# Patient Record
Sex: MALE | Race: WHITE | NOT HISPANIC OR LATINO | Employment: OTHER | URBAN - METROPOLITAN AREA
[De-identification: names, ages, dates, MRNs, and addresses within clinical notes are randomized per-mention and may not be internally consistent; named-entity substitution may affect disease eponyms.]

---

## 2017-01-04 ENCOUNTER — APPOINTMENT (OUTPATIENT)
Dept: LAB | Facility: MEDICAL CENTER | Age: 62
End: 2017-01-04
Payer: COMMERCIAL

## 2017-01-04 DIAGNOSIS — D58.9 HEREDITARY HEMOLYTIC ANEMIA, UNSPECIFIED (HCC): ICD-10-CM

## 2017-01-04 LAB
BASOPHILS # BLD AUTO: 0.02 THOUSANDS/ΜL (ref 0–0.1)
BASOPHILS NFR BLD AUTO: 0 % (ref 0–1)
EOSINOPHIL # BLD AUTO: 0.42 THOUSAND/ΜL (ref 0–0.61)
EOSINOPHIL NFR BLD AUTO: 8 % (ref 0–6)
ERYTHROCYTE [DISTWIDTH] IN BLOOD BY AUTOMATED COUNT: 14.2 % (ref 11.6–15.1)
HCT VFR BLD AUTO: 37.2 % (ref 36.5–49.3)
HGB BLD-MCNC: 12.4 G/DL (ref 12–17)
LYMPHOCYTES # BLD AUTO: 1.65 THOUSANDS/ΜL (ref 0.6–4.47)
LYMPHOCYTES NFR BLD AUTO: 33 % (ref 14–44)
MCH RBC QN AUTO: 34.3 PG (ref 26.8–34.3)
MCHC RBC AUTO-ENTMCNC: 33.3 G/DL (ref 31.4–37.4)
MCV RBC AUTO: 103 FL (ref 82–98)
MONOCYTES # BLD AUTO: 0.41 THOUSAND/ΜL (ref 0.17–1.22)
MONOCYTES NFR BLD AUTO: 8 % (ref 4–12)
NEUTROPHILS # BLD AUTO: 2.48 THOUSANDS/ΜL (ref 1.85–7.62)
NEUTS SEG NFR BLD AUTO: 51 % (ref 43–75)
NRBC BLD AUTO-RTO: 0 /100 WBCS
PLATELET # BLD AUTO: 222 THOUSANDS/UL (ref 149–390)
PMV BLD AUTO: 10.4 FL (ref 8.9–12.7)
RBC # BLD AUTO: 3.61 MILLION/UL (ref 3.88–5.62)
WBC # BLD AUTO: 4.99 THOUSAND/UL (ref 4.31–10.16)

## 2017-01-04 PROCEDURE — 36415 COLL VENOUS BLD VENIPUNCTURE: CPT

## 2017-01-04 PROCEDURE — 85025 COMPLETE CBC W/AUTO DIFF WBC: CPT

## 2017-01-06 ENCOUNTER — GENERIC CONVERSION - ENCOUNTER (OUTPATIENT)
Dept: OTHER | Facility: OTHER | Age: 62
End: 2017-01-06

## 2017-01-11 ENCOUNTER — APPOINTMENT (OUTPATIENT)
Dept: LAB | Facility: MEDICAL CENTER | Age: 62
End: 2017-01-11
Payer: COMMERCIAL

## 2017-01-11 DIAGNOSIS — D58.9 HEREDITARY HEMOLYTIC ANEMIA, UNSPECIFIED (HCC): ICD-10-CM

## 2017-01-11 LAB
BASOPHILS # BLD AUTO: 0.04 THOUSANDS/ΜL (ref 0–0.1)
BASOPHILS NFR BLD AUTO: 1 % (ref 0–1)
EOSINOPHIL # BLD AUTO: 0.48 THOUSAND/ΜL (ref 0–0.61)
EOSINOPHIL NFR BLD AUTO: 10 % (ref 0–6)
ERYTHROCYTE [DISTWIDTH] IN BLOOD BY AUTOMATED COUNT: 14 % (ref 11.6–15.1)
HCT VFR BLD AUTO: 37.8 % (ref 36.5–49.3)
HGB BLD-MCNC: 12.6 G/DL (ref 12–17)
LYMPHOCYTES # BLD AUTO: 1.78 THOUSANDS/ΜL (ref 0.6–4.47)
LYMPHOCYTES NFR BLD AUTO: 38 % (ref 14–44)
MCH RBC QN AUTO: 34.3 PG (ref 26.8–34.3)
MCHC RBC AUTO-ENTMCNC: 33.3 G/DL (ref 31.4–37.4)
MCV RBC AUTO: 103 FL (ref 82–98)
MONOCYTES # BLD AUTO: 0.5 THOUSAND/ΜL (ref 0.17–1.22)
MONOCYTES NFR BLD AUTO: 11 % (ref 4–12)
NEUTROPHILS # BLD AUTO: 1.86 THOUSANDS/ΜL (ref 1.85–7.62)
NEUTS SEG NFR BLD AUTO: 40 % (ref 43–75)
NRBC BLD AUTO-RTO: 0 /100 WBCS
PLATELET # BLD AUTO: 215 THOUSANDS/UL (ref 149–390)
PMV BLD AUTO: 10.5 FL (ref 8.9–12.7)
RBC # BLD AUTO: 3.67 MILLION/UL (ref 3.88–5.62)
WBC # BLD AUTO: 4.68 THOUSAND/UL (ref 4.31–10.16)

## 2017-01-11 PROCEDURE — 85025 COMPLETE CBC W/AUTO DIFF WBC: CPT

## 2017-01-11 PROCEDURE — 36415 COLL VENOUS BLD VENIPUNCTURE: CPT

## 2017-01-17 ENCOUNTER — ALLSCRIPTS OFFICE VISIT (OUTPATIENT)
Dept: OTHER | Facility: OTHER | Age: 62
End: 2017-01-17

## 2017-01-23 ENCOUNTER — LAB (OUTPATIENT)
Dept: LAB | Facility: MEDICAL CENTER | Age: 62
End: 2017-01-23
Payer: COMMERCIAL

## 2017-01-23 ENCOUNTER — TRANSCRIBE ORDERS (OUTPATIENT)
Dept: ADMINISTRATIVE | Facility: HOSPITAL | Age: 62
End: 2017-01-23

## 2017-01-23 DIAGNOSIS — D58.9 HEREDITARY HEMOLYTIC ANEMIA, UNSPECIFIED (HCC): Primary | ICD-10-CM

## 2017-01-23 DIAGNOSIS — D59.11 HEMOLYTIC ANEMIA DUE TO WARM ANTIBODY (HCC): ICD-10-CM

## 2017-01-23 LAB
BASOPHILS # BLD AUTO: 0.04 THOUSANDS/ΜL (ref 0–0.1)
BASOPHILS NFR BLD AUTO: 1 % (ref 0–1)
EOSINOPHIL # BLD AUTO: 1.18 THOUSAND/ΜL (ref 0–0.61)
EOSINOPHIL NFR BLD AUTO: 14 % (ref 0–6)
ERYTHROCYTE [DISTWIDTH] IN BLOOD BY AUTOMATED COUNT: 13.4 % (ref 11.6–15.1)
HCT VFR BLD AUTO: 37.8 % (ref 36.5–49.3)
HGB BLD-MCNC: 12.6 G/DL (ref 12–17)
LYMPHOCYTES # BLD AUTO: 1.89 THOUSANDS/ΜL (ref 0.6–4.47)
LYMPHOCYTES NFR BLD AUTO: 22 % (ref 14–44)
MCH RBC QN AUTO: 34.1 PG (ref 26.8–34.3)
MCHC RBC AUTO-ENTMCNC: 33.3 G/DL (ref 31.4–37.4)
MCV RBC AUTO: 102 FL (ref 82–98)
MONOCYTES # BLD AUTO: 0.56 THOUSAND/ΜL (ref 0.17–1.22)
MONOCYTES NFR BLD AUTO: 7 % (ref 4–12)
NEUTROPHILS # BLD AUTO: 4.93 THOUSANDS/ΜL (ref 1.85–7.62)
NEUTS SEG NFR BLD AUTO: 56 % (ref 43–75)
NRBC BLD AUTO-RTO: 0 /100 WBCS
PLATELET # BLD AUTO: 218 THOUSANDS/UL (ref 149–390)
PMV BLD AUTO: 11.1 FL (ref 8.9–12.7)
RBC # BLD AUTO: 3.7 MILLION/UL (ref 3.88–5.62)
WBC # BLD AUTO: 8.63 THOUSAND/UL (ref 4.31–10.16)

## 2017-01-23 PROCEDURE — 36415 COLL VENOUS BLD VENIPUNCTURE: CPT

## 2017-01-23 PROCEDURE — 85025 COMPLETE CBC W/AUTO DIFF WBC: CPT

## 2017-02-02 ENCOUNTER — APPOINTMENT (OUTPATIENT)
Dept: LAB | Facility: MEDICAL CENTER | Age: 62
End: 2017-02-02
Payer: COMMERCIAL

## 2017-02-02 DIAGNOSIS — D58.9 HEREDITARY HEMOLYTIC ANEMIA, UNSPECIFIED (HCC): ICD-10-CM

## 2017-02-02 LAB
BASOPHILS # BLD AUTO: 0.05 THOUSANDS/ΜL (ref 0–0.1)
BASOPHILS NFR BLD AUTO: 1 % (ref 0–1)
EOSINOPHIL # BLD AUTO: 1.2 THOUSAND/ΜL (ref 0–0.61)
EOSINOPHIL NFR BLD AUTO: 17 % (ref 0–6)
ERYTHROCYTE [DISTWIDTH] IN BLOOD BY AUTOMATED COUNT: 14.1 % (ref 11.6–15.1)
HCT VFR BLD AUTO: 22.7 % (ref 36.5–49.3)
HGB BLD-MCNC: 7.8 G/DL (ref 12–17)
LYMPHOCYTES # BLD AUTO: 1.62 THOUSANDS/ΜL (ref 0.6–4.47)
LYMPHOCYTES NFR BLD AUTO: 23 % (ref 14–44)
MCH RBC QN AUTO: 35.9 PG (ref 26.8–34.3)
MCHC RBC AUTO-ENTMCNC: 34.4 G/DL (ref 31.4–37.4)
MCV RBC AUTO: 105 FL (ref 82–98)
MONOCYTES # BLD AUTO: 0.62 THOUSAND/ΜL (ref 0.17–1.22)
MONOCYTES NFR BLD AUTO: 9 % (ref 4–12)
NEUTROPHILS # BLD AUTO: 3.4 THOUSANDS/ΜL (ref 1.85–7.62)
NEUTS SEG NFR BLD AUTO: 50 % (ref 43–75)
NRBC BLD AUTO-RTO: 1 /100 WBCS
PLATELET # BLD AUTO: 206 THOUSANDS/UL (ref 149–390)
PMV BLD AUTO: 10.6 FL (ref 8.9–12.7)
RBC # BLD AUTO: 2.17 MILLION/UL (ref 3.88–5.62)
WBC # BLD AUTO: 6.95 THOUSAND/UL (ref 4.31–10.16)

## 2017-02-02 PROCEDURE — 85025 COMPLETE CBC W/AUTO DIFF WBC: CPT

## 2017-02-02 PROCEDURE — 36415 COLL VENOUS BLD VENIPUNCTURE: CPT

## 2017-02-03 ENCOUNTER — GENERIC CONVERSION - ENCOUNTER (OUTPATIENT)
Dept: OTHER | Facility: OTHER | Age: 62
End: 2017-02-03

## 2017-02-03 ENCOUNTER — APPOINTMENT (EMERGENCY)
Dept: RADIOLOGY | Facility: HOSPITAL | Age: 62
DRG: 810 | End: 2017-02-03
Payer: COMMERCIAL

## 2017-02-03 ENCOUNTER — HOSPITAL ENCOUNTER (INPATIENT)
Facility: HOSPITAL | Age: 62
LOS: 1 days | Discharge: HOME | DRG: 810 | End: 2017-02-05
Attending: EMERGENCY MEDICINE | Admitting: INTERNAL MEDICINE
Payer: COMMERCIAL

## 2017-02-03 DIAGNOSIS — D64.9 SYMPTOMATIC ANEMIA: Primary | ICD-10-CM

## 2017-02-03 DIAGNOSIS — E80.6 HYPERBILIRUBINEMIA: ICD-10-CM

## 2017-02-03 DIAGNOSIS — D59.11 HEMOLYTIC ANEMIA DUE TO WARM ANTIBODY (HCC): ICD-10-CM

## 2017-02-03 LAB
ABO GROUP BLD: NORMAL
ALBUMIN SERPL BCP-MCNC: 3.9 G/DL (ref 3.5–5)
ALP SERPL-CCNC: 88 U/L (ref 46–116)
ALT SERPL W P-5'-P-CCNC: 35 U/L (ref 12–78)
ANION GAP SERPL CALCULATED.3IONS-SCNC: 12 MMOL/L (ref 4–13)
AST SERPL W P-5'-P-CCNC: 78 U/L (ref 5–45)
ATRIAL RATE: 96 BPM
BASOPHILS # BLD AUTO: 0.04 THOUSANDS/ΜL (ref 0–0.1)
BASOPHILS NFR BLD AUTO: 0 % (ref 0–1)
BILIRUB SERPL-MCNC: 7.55 MG/DL (ref 0.2–1)
BLD GP AB SCN SERPL QL: POSITIVE
BLOOD GROUP ANTIBODIES SERPL: NORMAL
BUN SERPL-MCNC: 25 MG/DL (ref 5–25)
CALCIUM SERPL-MCNC: 9 MG/DL (ref 8.3–10.1)
CHLORIDE SERPL-SCNC: 103 MMOL/L (ref 100–108)
CO2 SERPL-SCNC: 26 MMOL/L (ref 21–32)
CREAT SERPL-MCNC: 1.04 MG/DL (ref 0.6–1.3)
EOSINOPHIL # BLD AUTO: 0.14 THOUSAND/ΜL (ref 0–0.61)
EOSINOPHIL NFR BLD AUTO: 1 % (ref 0–6)
ERYTHROCYTE [DISTWIDTH] IN BLOOD BY AUTOMATED COUNT: 15.3 % (ref 11.6–15.1)
GFR SERPL CREATININE-BSD FRML MDRD: >60 ML/MIN/1.73SQ M
GLUCOSE SERPL-MCNC: 143 MG/DL (ref 65–140)
HCT VFR BLD AUTO: 18.3 % (ref 36.5–49.3)
HGB BLD-MCNC: 6.4 G/DL (ref 12–17)
LYMPHOCYTES # BLD AUTO: 1.16 THOUSANDS/ΜL (ref 0.6–4.47)
LYMPHOCYTES NFR BLD AUTO: 11 % (ref 14–44)
MCH RBC QN AUTO: 37.9 PG (ref 26.8–34.3)
MCHC RBC AUTO-ENTMCNC: 35 G/DL (ref 31.4–37.4)
MCV RBC AUTO: 108 FL (ref 82–98)
MONOCYTES # BLD AUTO: 0.52 THOUSAND/ΜL (ref 0.17–1.22)
MONOCYTES NFR BLD AUTO: 5 % (ref 4–12)
NEUTROPHILS # BLD AUTO: 8.44 THOUSANDS/ΜL (ref 1.85–7.62)
NEUTS SEG NFR BLD AUTO: 83 % (ref 43–75)
NRBC BLD AUTO-RTO: 1 /100 WBCS
P AXIS: 42 DEGREES
PLATELET # BLD AUTO: 219 THOUSANDS/UL (ref 149–390)
PMV BLD AUTO: 10.2 FL (ref 8.9–12.7)
POTASSIUM SERPL-SCNC: 3.8 MMOL/L (ref 3.5–5.3)
PR INTERVAL: 150 MS
PROT SERPL-MCNC: 7.1 G/DL (ref 6.4–8.2)
QRS AXIS: 42 DEGREES
QRSD INTERVAL: 82 MS
QT INTERVAL: 342 MS
QTC INTERVAL: 432 MS
RBC # BLD AUTO: 1.69 MILLION/UL (ref 3.88–5.62)
RH BLD: POSITIVE
SODIUM SERPL-SCNC: 141 MMOL/L (ref 136–145)
T WAVE AXIS: 48 DEGREES
TROPONIN I SERPL-MCNC: 0.03 NG/ML
VENTRICULAR RATE: 96 BPM
WBC # BLD AUTO: 10.3 THOUSAND/UL (ref 4.31–10.16)

## 2017-02-03 PROCEDURE — 86922 COMPATIBILITY TEST ANTIGLOB: CPT

## 2017-02-03 PROCEDURE — 86921 COMPATIBILITY TEST INCUBATE: CPT

## 2017-02-03 PROCEDURE — 30233N1 TRANSFUSION OF NONAUTOLOGOUS RED BLOOD CELLS INTO PERIPHERAL VEIN, PERCUTANEOUS APPROACH: ICD-10-PCS | Performed by: EMERGENCY MEDICINE

## 2017-02-03 PROCEDURE — 84484 ASSAY OF TROPONIN QUANT: CPT | Performed by: EMERGENCY MEDICINE

## 2017-02-03 PROCEDURE — 86900 BLOOD TYPING SEROLOGIC ABO: CPT | Performed by: EMERGENCY MEDICINE

## 2017-02-03 PROCEDURE — 86850 RBC ANTIBODY SCREEN: CPT | Performed by: EMERGENCY MEDICINE

## 2017-02-03 PROCEDURE — 71020 HB CHEST X-RAY 2VW FRONTAL&LATL: CPT

## 2017-02-03 PROCEDURE — 36415 COLL VENOUS BLD VENIPUNCTURE: CPT | Performed by: EMERGENCY MEDICINE

## 2017-02-03 PROCEDURE — 86901 BLOOD TYPING SEROLOGIC RH(D): CPT | Performed by: EMERGENCY MEDICINE

## 2017-02-03 PROCEDURE — 99285 EMERGENCY DEPT VISIT HI MDM: CPT

## 2017-02-03 PROCEDURE — 85025 COMPLETE CBC W/AUTO DIFF WBC: CPT | Performed by: EMERGENCY MEDICINE

## 2017-02-03 PROCEDURE — 86902 BLOOD TYPE ANTIGEN DONOR EA: CPT

## 2017-02-03 PROCEDURE — 86870 RBC ANTIBODY IDENTIFICATION: CPT | Performed by: EMERGENCY MEDICINE

## 2017-02-03 PROCEDURE — 80053 COMPREHEN METABOLIC PANEL: CPT

## 2017-02-03 PROCEDURE — 93005 ELECTROCARDIOGRAM TRACING: CPT

## 2017-02-03 PROCEDURE — P9021 RED BLOOD CELLS UNIT: HCPCS

## 2017-02-03 RX ORDER — LANOLIN ALCOHOL/MO/W.PET/CERES
1000 CREAM (GRAM) TOPICAL DAILY
COMMUNITY
End: 2020-01-14 | Stop reason: ALTCHOICE

## 2017-02-03 RX ORDER — ONDANSETRON 2 MG/ML
4 INJECTION INTRAMUSCULAR; INTRAVENOUS EVERY 6 HOURS PRN
Status: DISCONTINUED | OUTPATIENT
Start: 2017-02-03 | End: 2017-02-04

## 2017-02-03 RX ORDER — ERGOCALCIFEROL 1.25 MG/1
50000 CAPSULE ORAL WEEKLY
COMMUNITY
End: 2019-10-28

## 2017-02-03 RX ORDER — PREDNISONE 20 MG/1
50 TABLET ORAL DAILY
Status: DISCONTINUED | OUTPATIENT
Start: 2017-02-03 | End: 2017-02-05 | Stop reason: HOSPADM

## 2017-02-03 RX ORDER — PREDNISONE 50 MG/1
10 TABLET ORAL DAILY
COMMUNITY
End: 2017-05-22 | Stop reason: HOSPADM

## 2017-02-03 RX ORDER — CHOLECALCIFEROL (VITAMIN D3) 125 MCG
1000 CAPSULE ORAL DAILY
Status: DISCONTINUED | OUTPATIENT
Start: 2017-02-03 | End: 2017-02-05 | Stop reason: HOSPADM

## 2017-02-03 RX ORDER — ACETAMINOPHEN 325 MG/1
650 TABLET ORAL EVERY 6 HOURS PRN
Status: DISCONTINUED | OUTPATIENT
Start: 2017-02-03 | End: 2017-02-05 | Stop reason: HOSPADM

## 2017-02-03 RX ORDER — NICOTINE 21 MG/24HR
1 PATCH, TRANSDERMAL 24 HOURS TRANSDERMAL DAILY
Status: DISCONTINUED | OUTPATIENT
Start: 2017-02-03 | End: 2017-02-05 | Stop reason: HOSPADM

## 2017-02-03 RX ORDER — PNV NO.95/FERROUS FUM/FOLIC AC 28MG-0.8MG
325 TABLET ORAL DAILY
COMMUNITY
End: 2018-04-17 | Stop reason: CLARIF

## 2017-02-03 RX ORDER — FERROUS SULFATE 325(65) MG
325 TABLET ORAL 2 TIMES DAILY
Status: DISCONTINUED | OUTPATIENT
Start: 2017-02-03 | End: 2017-02-05 | Stop reason: HOSPADM

## 2017-02-03 RX ORDER — PANTOPRAZOLE SODIUM 40 MG/1
40 TABLET, DELAYED RELEASE ORAL
Status: DISCONTINUED | OUTPATIENT
Start: 2017-02-03 | End: 2017-02-05 | Stop reason: HOSPADM

## 2017-02-04 LAB
ABO GROUP BLD BPU: NORMAL
ABO GROUP BLD BPU: NORMAL
ALBUMIN SERPL BCP-MCNC: 3.3 G/DL (ref 3.5–5)
ALP SERPL-CCNC: 76 U/L (ref 46–116)
ALT SERPL W P-5'-P-CCNC: 31 U/L (ref 12–78)
ANION GAP SERPL CALCULATED.3IONS-SCNC: 8 MMOL/L (ref 4–13)
AST SERPL W P-5'-P-CCNC: 58 U/L (ref 5–45)
BILIRUB SERPL-MCNC: 5.51 MG/DL (ref 0.2–1)
BPU ID: NORMAL
BPU ID: NORMAL
BUN SERPL-MCNC: 25 MG/DL (ref 5–25)
CALCIUM SERPL-MCNC: 8.5 MG/DL (ref 8.3–10.1)
CHLORIDE SERPL-SCNC: 107 MMOL/L (ref 100–108)
CO2 SERPL-SCNC: 28 MMOL/L (ref 21–32)
CREAT SERPL-MCNC: 0.93 MG/DL (ref 0.6–1.3)
CROSSMATCH: NORMAL
CROSSMATCH: NORMAL
ERYTHROCYTE [DISTWIDTH] IN BLOOD BY AUTOMATED COUNT: 17 % (ref 11.6–15.1)
GFR SERPL CREATININE-BSD FRML MDRD: >60 ML/MIN/1.73SQ M
GLUCOSE SERPL-MCNC: 99 MG/DL (ref 65–140)
HCT VFR BLD AUTO: 22.2 % (ref 36.5–49.3)
HGB BLD-MCNC: 7.8 G/DL (ref 12–17)
MCH RBC QN AUTO: 35.5 PG (ref 26.8–34.3)
MCHC RBC AUTO-ENTMCNC: 35.1 G/DL (ref 31.4–37.4)
MCV RBC AUTO: 101 FL (ref 82–98)
PLATELET # BLD AUTO: 148 THOUSANDS/UL (ref 149–390)
PMV BLD AUTO: 9.6 FL (ref 8.9–12.7)
POTASSIUM SERPL-SCNC: 3.5 MMOL/L (ref 3.5–5.3)
PROT SERPL-MCNC: 6.2 G/DL (ref 6.4–8.2)
RBC # BLD AUTO: 2.2 MILLION/UL (ref 3.88–5.62)
SODIUM SERPL-SCNC: 143 MMOL/L (ref 136–145)
UNIT DISPENSE STATUS: NORMAL
UNIT DISPENSE STATUS: NORMAL
UNIT PRODUCT CODE: NORMAL
UNIT PRODUCT CODE: NORMAL
UNIT RH: NORMAL
UNIT RH: NORMAL
WBC # BLD AUTO: 8.14 THOUSAND/UL (ref 4.31–10.16)

## 2017-02-04 PROCEDURE — 80053 COMPREHEN METABOLIC PANEL: CPT | Performed by: PHYSICIAN ASSISTANT

## 2017-02-04 PROCEDURE — P9016 RBC LEUKOCYTES REDUCED: HCPCS

## 2017-02-04 PROCEDURE — 85027 COMPLETE CBC AUTOMATED: CPT | Performed by: PHYSICIAN ASSISTANT

## 2017-02-04 RX ORDER — ACETAMINOPHEN 325 MG/1
650 TABLET ORAL EVERY 6 HOURS PRN
Status: DISCONTINUED | OUTPATIENT
Start: 2017-02-04 | End: 2017-02-04

## 2017-02-04 RX ORDER — CALCIUM CARBONATE 500(1250)
1 TABLET ORAL 2 TIMES DAILY WITH MEALS
Status: DISCONTINUED | OUTPATIENT
Start: 2017-02-04 | End: 2017-02-05 | Stop reason: HOSPADM

## 2017-02-04 RX ORDER — POLYETHYLENE GLYCOL 3350 17 G/17G
17 POWDER, FOR SOLUTION ORAL DAILY PRN
Status: DISCONTINUED | OUTPATIENT
Start: 2017-02-04 | End: 2017-02-05 | Stop reason: HOSPADM

## 2017-02-04 RX ADMIN — Medication 325 MG: at 17:14

## 2017-02-04 RX ADMIN — PREDNISONE 50 MG: 20 TABLET ORAL at 08:51

## 2017-02-04 RX ADMIN — CYANOCOBALAMIN TAB 500 MCG 1000 MCG: 500 TAB at 08:50

## 2017-02-04 RX ADMIN — Medication 325 MG: at 08:51

## 2017-02-04 RX ADMIN — Medication 1 TABLET: at 08:50

## 2017-02-04 RX ADMIN — PANTOPRAZOLE SODIUM 40 MG: 40 TABLET, DELAYED RELEASE ORAL at 05:11

## 2017-02-05 VITALS
HEIGHT: 69 IN | BODY MASS INDEX: 28.29 KG/M2 | SYSTOLIC BLOOD PRESSURE: 128 MMHG | WEIGHT: 191 LBS | HEART RATE: 77 BPM | TEMPERATURE: 97.2 F | DIASTOLIC BLOOD PRESSURE: 67 MMHG | OXYGEN SATURATION: 98 % | RESPIRATION RATE: 18 BRPM

## 2017-02-05 LAB
ABO GROUP BLD BPU: NORMAL
ALBUMIN SERPL BCP-MCNC: 3.2 G/DL (ref 3.5–5)
ALP SERPL-CCNC: 70 U/L (ref 46–116)
ALT SERPL W P-5'-P-CCNC: 28 U/L (ref 12–78)
ANION GAP SERPL CALCULATED.3IONS-SCNC: 9 MMOL/L (ref 4–13)
AST SERPL W P-5'-P-CCNC: 38 U/L (ref 5–45)
BASOPHILS # BLD AUTO: 0.04 THOUSANDS/ΜL (ref 0–0.1)
BASOPHILS NFR BLD AUTO: 1 % (ref 0–1)
BILIRUB SERPL-MCNC: 3.83 MG/DL (ref 0.2–1)
BPU ID: NORMAL
BUN SERPL-MCNC: 24 MG/DL (ref 5–25)
CALCIUM SERPL-MCNC: 8.3 MG/DL (ref 8.3–10.1)
CHLORIDE SERPL-SCNC: 107 MMOL/L (ref 100–108)
CO2 SERPL-SCNC: 26 MMOL/L (ref 21–32)
CREAT SERPL-MCNC: 0.87 MG/DL (ref 0.6–1.3)
CROSSMATCH: NORMAL
EOSINOPHIL # BLD AUTO: 0.16 THOUSAND/ΜL (ref 0–0.61)
EOSINOPHIL NFR BLD AUTO: 2 % (ref 0–6)
ERYTHROCYTE [DISTWIDTH] IN BLOOD BY AUTOMATED COUNT: 19.4 % (ref 11.6–15.1)
GFR SERPL CREATININE-BSD FRML MDRD: >60 ML/MIN/1.73SQ M
GLUCOSE SERPL-MCNC: 99 MG/DL (ref 65–140)
HCT VFR BLD AUTO: 22.9 % (ref 36.5–49.3)
HGB BLD-MCNC: 8 G/DL (ref 12–17)
LYMPHOCYTES # BLD AUTO: 1.67 THOUSANDS/ΜL (ref 0.6–4.47)
LYMPHOCYTES NFR BLD AUTO: 21 % (ref 14–44)
MCH RBC QN AUTO: 34.2 PG (ref 26.8–34.3)
MCHC RBC AUTO-ENTMCNC: 34.9 G/DL (ref 31.4–37.4)
MCV RBC AUTO: 98 FL (ref 82–98)
MONOCYTES # BLD AUTO: 0.81 THOUSAND/ΜL (ref 0.17–1.22)
MONOCYTES NFR BLD AUTO: 10 % (ref 4–12)
NEUTROPHILS # BLD AUTO: 5.31 THOUSANDS/ΜL (ref 1.85–7.62)
NEUTS SEG NFR BLD AUTO: 66 % (ref 43–75)
NRBC BLD AUTO-RTO: 1 /100 WBCS
PLATELET # BLD AUTO: 157 THOUSANDS/UL (ref 149–390)
PMV BLD AUTO: 10.1 FL (ref 8.9–12.7)
POTASSIUM SERPL-SCNC: 3.2 MMOL/L (ref 3.5–5.3)
PROT SERPL-MCNC: 6 G/DL (ref 6.4–8.2)
RBC # BLD AUTO: 2.34 MILLION/UL (ref 3.88–5.62)
SODIUM SERPL-SCNC: 142 MMOL/L (ref 136–145)
UNIT DISPENSE STATUS: NORMAL
UNIT PRODUCT CODE: NORMAL
UNIT RH: NORMAL
WBC # BLD AUTO: 7.99 THOUSAND/UL (ref 4.31–10.16)

## 2017-02-05 PROCEDURE — 80053 COMPREHEN METABOLIC PANEL: CPT | Performed by: INTERNAL MEDICINE

## 2017-02-05 PROCEDURE — 90670 PCV13 VACCINE IM: CPT | Performed by: INTERNAL MEDICINE

## 2017-02-05 PROCEDURE — 85025 COMPLETE CBC W/AUTO DIFF WBC: CPT | Performed by: INTERNAL MEDICINE

## 2017-02-05 PROCEDURE — 90686 IIV4 VACC NO PRSV 0.5 ML IM: CPT | Performed by: INTERNAL MEDICINE

## 2017-02-05 RX ORDER — POTASSIUM CHLORIDE 20 MEQ/1
40 TABLET, EXTENDED RELEASE ORAL ONCE
Status: COMPLETED | OUTPATIENT
Start: 2017-02-05 | End: 2017-02-05

## 2017-02-05 RX ORDER — PANTOPRAZOLE SODIUM 40 MG/1
40 TABLET, DELAYED RELEASE ORAL
Qty: 30 TABLET | Refills: 0 | Status: ON HOLD | OUTPATIENT
Start: 2017-02-05 | End: 2017-06-09

## 2017-02-05 RX ORDER — NICOTINE 21 MG/24HR
1 PATCH, TRANSDERMAL 24 HOURS TRANSDERMAL DAILY
Qty: 7 PATCH | Refills: 0 | Status: SHIPPED | OUTPATIENT
Start: 2017-02-05 | End: 2017-12-23

## 2017-02-05 RX ADMIN — Medication 1 TABLET: at 08:22

## 2017-02-05 RX ADMIN — PANTOPRAZOLE SODIUM 40 MG: 40 TABLET, DELAYED RELEASE ORAL at 05:40

## 2017-02-05 RX ADMIN — INFLUENZA VIRUS VACCINE 0.5 ML: 15; 15; 15; 15 SUSPENSION INTRAMUSCULAR at 12:15

## 2017-02-05 RX ADMIN — CYANOCOBALAMIN TAB 500 MCG 1000 MCG: 500 TAB at 08:21

## 2017-02-05 RX ADMIN — POTASSIUM CHLORIDE 40 MEQ: 1500 TABLET, EXTENDED RELEASE ORAL at 08:21

## 2017-02-05 RX ADMIN — Medication 325 MG: at 08:22

## 2017-02-05 RX ADMIN — PNEUMOCOCCAL 13-VALENT CONJUGATE VACCINE 0.5 ML: 2.2; 2.2; 2.2; 2.2; 2.2; 4.4; 2.2; 2.2; 2.2; 2.2; 2.2; 2.2; 2.2 INJECTION, SUSPENSION INTRAMUSCULAR at 12:23

## 2017-02-05 RX ADMIN — PREDNISONE 50 MG: 20 TABLET ORAL at 08:22

## 2017-02-06 ENCOUNTER — TRANSCRIBE ORDERS (OUTPATIENT)
Dept: ADMINISTRATIVE | Facility: HOSPITAL | Age: 62
End: 2017-02-06

## 2017-02-06 ENCOUNTER — APPOINTMENT (OUTPATIENT)
Dept: LAB | Facility: MEDICAL CENTER | Age: 62
End: 2017-02-06
Payer: COMMERCIAL

## 2017-02-06 DIAGNOSIS — D58.9 HEREDITARY HEMOLYTIC ANEMIA, UNSPECIFIED (HCC): Primary | ICD-10-CM

## 2017-02-06 DIAGNOSIS — D58.9 HEREDITARY HEMOLYTIC ANEMIA, UNSPECIFIED (HCC): ICD-10-CM

## 2017-02-06 DIAGNOSIS — D50.8 OTHER IRON DEFICIENCY ANEMIA: ICD-10-CM

## 2017-02-06 DIAGNOSIS — D50.8 OTHER IRON DEFICIENCY ANEMIA: Primary | ICD-10-CM

## 2017-02-06 LAB
BASOPHILS # BLD AUTO: 0.02 THOUSANDS/ΜL (ref 0–0.1)
BASOPHILS NFR BLD AUTO: 0 % (ref 0–1)
EOSINOPHIL # BLD AUTO: 0.04 THOUSAND/ΜL (ref 0–0.61)
EOSINOPHIL NFR BLD AUTO: 1 % (ref 0–6)
ERYTHROCYTE [DISTWIDTH] IN BLOOD BY AUTOMATED COUNT: 20.5 % (ref 11.6–15.1)
HCT VFR BLD AUTO: 26.8 % (ref 36.5–49.3)
HGB BLD-MCNC: 9 G/DL (ref 12–17)
LYMPHOCYTES # BLD AUTO: 1.07 THOUSANDS/ΜL (ref 0.6–4.47)
LYMPHOCYTES NFR BLD AUTO: 14 % (ref 14–44)
MCH RBC QN AUTO: 33.8 PG (ref 26.8–34.3)
MCHC RBC AUTO-ENTMCNC: 33.6 G/DL (ref 31.4–37.4)
MCV RBC AUTO: 101 FL (ref 82–98)
MONOCYTES # BLD AUTO: 0.6 THOUSAND/ΜL (ref 0.17–1.22)
MONOCYTES NFR BLD AUTO: 8 % (ref 4–12)
NEUTROPHILS # BLD AUTO: 5.91 THOUSANDS/ΜL (ref 1.85–7.62)
NEUTS SEG NFR BLD AUTO: 77 % (ref 43–75)
NRBC BLD AUTO-RTO: 0 /100 WBCS
PLATELET # BLD AUTO: 200 THOUSANDS/UL (ref 149–390)
PMV BLD AUTO: 10.8 FL (ref 8.9–12.7)
RBC # BLD AUTO: 2.66 MILLION/UL (ref 3.88–5.62)
WBC # BLD AUTO: 7.71 THOUSAND/UL (ref 4.31–10.16)

## 2017-02-06 PROCEDURE — 85025 COMPLETE CBC W/AUTO DIFF WBC: CPT

## 2017-02-06 PROCEDURE — 36415 COLL VENOUS BLD VENIPUNCTURE: CPT

## 2017-02-08 ENCOUNTER — GENERIC CONVERSION - ENCOUNTER (OUTPATIENT)
Dept: OTHER | Facility: OTHER | Age: 62
End: 2017-02-08

## 2017-02-13 ENCOUNTER — APPOINTMENT (OUTPATIENT)
Dept: LAB | Facility: MEDICAL CENTER | Age: 62
End: 2017-02-13
Payer: COMMERCIAL

## 2017-02-13 DIAGNOSIS — D58.9 HEREDITARY HEMOLYTIC ANEMIA, UNSPECIFIED (HCC): ICD-10-CM

## 2017-02-13 LAB
BASOPHILS # BLD AUTO: 0.01 THOUSANDS/ΜL (ref 0–0.1)
BASOPHILS NFR BLD AUTO: 0 % (ref 0–1)
EOSINOPHIL # BLD AUTO: 0.06 THOUSAND/ΜL (ref 0–0.61)
EOSINOPHIL NFR BLD AUTO: 1 % (ref 0–6)
ERYTHROCYTE [DISTWIDTH] IN BLOOD BY AUTOMATED COUNT: 23 % (ref 11.6–15.1)
HCT VFR BLD AUTO: 31.5 % (ref 36.5–49.3)
HGB BLD-MCNC: 10 G/DL (ref 12–17)
LYMPHOCYTES # BLD AUTO: 1.32 THOUSANDS/ΜL (ref 0.6–4.47)
LYMPHOCYTES NFR BLD AUTO: 25 % (ref 14–44)
MCH RBC QN AUTO: 35.1 PG (ref 26.8–34.3)
MCHC RBC AUTO-ENTMCNC: 31.7 G/DL (ref 31.4–37.4)
MCV RBC AUTO: 111 FL (ref 82–98)
MONOCYTES # BLD AUTO: 0.5 THOUSAND/ΜL (ref 0.17–1.22)
MONOCYTES NFR BLD AUTO: 10 % (ref 4–12)
NEUTROPHILS # BLD AUTO: 3.31 THOUSANDS/ΜL (ref 1.85–7.62)
NEUTS SEG NFR BLD AUTO: 64 % (ref 43–75)
NRBC BLD AUTO-RTO: 0 /100 WBCS
PLATELET # BLD AUTO: 202 THOUSANDS/UL (ref 149–390)
PMV BLD AUTO: 10.7 FL (ref 8.9–12.7)
RBC # BLD AUTO: 2.85 MILLION/UL (ref 3.88–5.62)
WBC # BLD AUTO: 5.22 THOUSAND/UL (ref 4.31–10.16)

## 2017-02-13 PROCEDURE — 85025 COMPLETE CBC W/AUTO DIFF WBC: CPT

## 2017-02-13 PROCEDURE — 36415 COLL VENOUS BLD VENIPUNCTURE: CPT

## 2017-02-14 ENCOUNTER — ALLSCRIPTS OFFICE VISIT (OUTPATIENT)
Dept: OTHER | Facility: OTHER | Age: 62
End: 2017-02-14

## 2017-02-14 ENCOUNTER — GENERIC CONVERSION - ENCOUNTER (OUTPATIENT)
Dept: OTHER | Facility: OTHER | Age: 62
End: 2017-02-14

## 2017-02-16 ENCOUNTER — GENERIC CONVERSION - ENCOUNTER (OUTPATIENT)
Dept: OTHER | Facility: OTHER | Age: 62
End: 2017-02-16

## 2017-02-20 ENCOUNTER — LAB (OUTPATIENT)
Dept: LAB | Facility: MEDICAL CENTER | Age: 62
End: 2017-02-20
Payer: COMMERCIAL

## 2017-02-20 DIAGNOSIS — D58.9 HEREDITARY HEMOLYTIC ANEMIA, UNSPECIFIED (HCC): ICD-10-CM

## 2017-02-20 LAB
BASOPHILS # BLD AUTO: 0.01 THOUSANDS/ΜL (ref 0–0.1)
BASOPHILS NFR BLD AUTO: 0 % (ref 0–1)
EOSINOPHIL # BLD AUTO: 0.06 THOUSAND/ΜL (ref 0–0.61)
EOSINOPHIL NFR BLD AUTO: 1 % (ref 0–6)
ERYTHROCYTE [DISTWIDTH] IN BLOOD BY AUTOMATED COUNT: 18.9 % (ref 11.6–15.1)
HCT VFR BLD AUTO: 34.6 % (ref 36.5–49.3)
HGB BLD-MCNC: 11 G/DL (ref 12–17)
LYMPHOCYTES # BLD AUTO: 1.08 THOUSANDS/ΜL (ref 0.6–4.47)
LYMPHOCYTES NFR BLD AUTO: 20 % (ref 14–44)
MCH RBC QN AUTO: 34.7 PG (ref 26.8–34.3)
MCHC RBC AUTO-ENTMCNC: 31.8 G/DL (ref 31.4–37.4)
MCV RBC AUTO: 109 FL (ref 82–98)
MONOCYTES # BLD AUTO: 0.42 THOUSAND/ΜL (ref 0.17–1.22)
MONOCYTES NFR BLD AUTO: 8 % (ref 4–12)
NEUTROPHILS # BLD AUTO: 3.8 THOUSANDS/ΜL (ref 1.85–7.62)
NEUTS SEG NFR BLD AUTO: 71 % (ref 43–75)
NRBC BLD AUTO-RTO: 0 /100 WBCS
PLATELET # BLD AUTO: 202 THOUSANDS/UL (ref 149–390)
PMV BLD AUTO: 10.2 FL (ref 8.9–12.7)
RBC # BLD AUTO: 3.17 MILLION/UL (ref 3.88–5.62)
WBC # BLD AUTO: 5.38 THOUSAND/UL (ref 4.31–10.16)

## 2017-02-20 PROCEDURE — 85025 COMPLETE CBC W/AUTO DIFF WBC: CPT

## 2017-02-20 PROCEDURE — 36415 COLL VENOUS BLD VENIPUNCTURE: CPT

## 2017-02-21 ENCOUNTER — ALLSCRIPTS OFFICE VISIT (OUTPATIENT)
Dept: OTHER | Facility: OTHER | Age: 62
End: 2017-02-21

## 2017-02-27 ENCOUNTER — TRANSCRIBE ORDERS (OUTPATIENT)
Dept: ADMINISTRATIVE | Facility: HOSPITAL | Age: 62
End: 2017-02-27

## 2017-02-27 ENCOUNTER — LAB (OUTPATIENT)
Dept: LAB | Facility: MEDICAL CENTER | Age: 62
End: 2017-02-27
Payer: COMMERCIAL

## 2017-02-27 DIAGNOSIS — D58.9 HEREDITARY HEMOLYTIC ANEMIA, UNSPECIFIED (HCC): ICD-10-CM

## 2017-02-27 DIAGNOSIS — D58.9 HEREDITARY HEMOLYTIC ANEMIA, UNSPECIFIED (HCC): Primary | ICD-10-CM

## 2017-02-27 LAB
BASOPHILS # BLD AUTO: 0.01 THOUSANDS/ΜL (ref 0–0.1)
BASOPHILS NFR BLD AUTO: 0 % (ref 0–1)
EOSINOPHIL # BLD AUTO: 0.11 THOUSAND/ΜL (ref 0–0.61)
EOSINOPHIL NFR BLD AUTO: 2 % (ref 0–6)
ERYTHROCYTE [DISTWIDTH] IN BLOOD BY AUTOMATED COUNT: 16.1 % (ref 11.6–15.1)
HCT VFR BLD AUTO: 37.1 % (ref 36.5–49.3)
HGB BLD-MCNC: 12.3 G/DL (ref 12–17)
LYMPHOCYTES # BLD AUTO: 0.82 THOUSANDS/ΜL (ref 0.6–4.47)
LYMPHOCYTES NFR BLD AUTO: 14 % (ref 14–44)
MCH RBC QN AUTO: 35.9 PG (ref 26.8–34.3)
MCHC RBC AUTO-ENTMCNC: 33.2 G/DL (ref 31.4–37.4)
MCV RBC AUTO: 108 FL (ref 82–98)
MONOCYTES # BLD AUTO: 0.29 THOUSAND/ΜL (ref 0.17–1.22)
MONOCYTES NFR BLD AUTO: 5 % (ref 4–12)
NEUTROPHILS # BLD AUTO: 4.52 THOUSANDS/ΜL (ref 1.85–7.62)
NEUTS SEG NFR BLD AUTO: 79 % (ref 43–75)
NRBC BLD AUTO-RTO: 0 /100 WBCS
PLATELET # BLD AUTO: 181 THOUSANDS/UL (ref 149–390)
PMV BLD AUTO: 10.2 FL (ref 8.9–12.7)
RBC # BLD AUTO: 3.43 MILLION/UL (ref 3.88–5.62)
WBC # BLD AUTO: 5.76 THOUSAND/UL (ref 4.31–10.16)

## 2017-02-27 PROCEDURE — 36415 COLL VENOUS BLD VENIPUNCTURE: CPT

## 2017-02-27 PROCEDURE — 85025 COMPLETE CBC W/AUTO DIFF WBC: CPT

## 2017-02-28 ENCOUNTER — GENERIC CONVERSION - ENCOUNTER (OUTPATIENT)
Dept: OTHER | Facility: OTHER | Age: 62
End: 2017-02-28

## 2017-03-06 ENCOUNTER — LAB (OUTPATIENT)
Dept: LAB | Facility: MEDICAL CENTER | Age: 62
End: 2017-03-06
Payer: COMMERCIAL

## 2017-03-06 ENCOUNTER — ALLSCRIPTS OFFICE VISIT (OUTPATIENT)
Dept: OTHER | Facility: OTHER | Age: 62
End: 2017-03-06

## 2017-03-06 DIAGNOSIS — D58.9 HEREDITARY HEMOLYTIC ANEMIA, UNSPECIFIED (HCC): ICD-10-CM

## 2017-03-06 LAB
BASOPHILS # BLD AUTO: 0.02 THOUSANDS/ΜL (ref 0–0.1)
BASOPHILS NFR BLD AUTO: 0 % (ref 0–1)
EOSINOPHIL # BLD AUTO: 0.17 THOUSAND/ΜL (ref 0–0.61)
EOSINOPHIL NFR BLD AUTO: 3 % (ref 0–6)
ERYTHROCYTE [DISTWIDTH] IN BLOOD BY AUTOMATED COUNT: 14.2 % (ref 11.6–15.1)
HCT VFR BLD AUTO: 34.8 % (ref 36.5–49.3)
HGB BLD-MCNC: 11.8 G/DL (ref 12–17)
LYMPHOCYTES # BLD AUTO: 1.27 THOUSANDS/ΜL (ref 0.6–4.47)
LYMPHOCYTES NFR BLD AUTO: 24 % (ref 14–44)
MCH RBC QN AUTO: 36.4 PG (ref 26.8–34.3)
MCHC RBC AUTO-ENTMCNC: 33.9 G/DL (ref 31.4–37.4)
MCV RBC AUTO: 107 FL (ref 82–98)
MONOCYTES # BLD AUTO: 0.52 THOUSAND/ΜL (ref 0.17–1.22)
MONOCYTES NFR BLD AUTO: 10 % (ref 4–12)
NEUTROPHILS # BLD AUTO: 3.34 THOUSANDS/ΜL (ref 1.85–7.62)
NEUTS SEG NFR BLD AUTO: 63 % (ref 43–75)
NRBC BLD AUTO-RTO: 0 /100 WBCS
PLATELET # BLD AUTO: 177 THOUSANDS/UL (ref 149–390)
PMV BLD AUTO: 10.4 FL (ref 8.9–12.7)
RBC # BLD AUTO: 3.24 MILLION/UL (ref 3.88–5.62)
WBC # BLD AUTO: 5.33 THOUSAND/UL (ref 4.31–10.16)

## 2017-03-06 PROCEDURE — 36415 COLL VENOUS BLD VENIPUNCTURE: CPT

## 2017-03-06 PROCEDURE — 85025 COMPLETE CBC W/AUTO DIFF WBC: CPT

## 2017-03-07 ENCOUNTER — GENERIC CONVERSION - ENCOUNTER (OUTPATIENT)
Dept: OTHER | Facility: OTHER | Age: 62
End: 2017-03-07

## 2017-03-08 RX ORDER — ACETAMINOPHEN 325 MG/1
650 TABLET ORAL ONCE
Status: COMPLETED | OUTPATIENT
Start: 2017-03-09 | End: 2017-03-09

## 2017-03-08 RX ORDER — SODIUM CHLORIDE 9 MG/ML
20 INJECTION, SOLUTION INTRAVENOUS CONTINUOUS
Status: DISCONTINUED | OUTPATIENT
Start: 2017-03-09 | End: 2017-03-12 | Stop reason: HOSPADM

## 2017-03-09 ENCOUNTER — HOSPITAL ENCOUNTER (OUTPATIENT)
Dept: INFUSION CENTER | Facility: CLINIC | Age: 62
Discharge: HOME | End: 2017-03-09
Payer: COMMERCIAL

## 2017-03-09 VITALS
HEIGHT: 67 IN | WEIGHT: 198.85 LBS | SYSTOLIC BLOOD PRESSURE: 165 MMHG | HEART RATE: 104 BPM | RESPIRATION RATE: 16 BRPM | DIASTOLIC BLOOD PRESSURE: 92 MMHG | BODY MASS INDEX: 31.21 KG/M2 | TEMPERATURE: 98.7 F

## 2017-03-09 PROCEDURE — 96367 TX/PROPH/DG ADDL SEQ IV INF: CPT

## 2017-03-09 PROCEDURE — 96413 CHEMO IV INFUSION 1 HR: CPT

## 2017-03-09 PROCEDURE — 96415 CHEMO IV INFUSION ADDL HR: CPT

## 2017-03-09 RX ADMIN — RITUXIMAB 758 MG: 10 INJECTION, SOLUTION INTRAVENOUS at 09:25

## 2017-03-09 RX ADMIN — ACETAMINOPHEN 650 MG: 325 TABLET, FILM COATED ORAL at 08:30

## 2017-03-09 RX ADMIN — DIPHENHYDRAMINE HYDROCHLORIDE 25 MG: 50 INJECTION, SOLUTION INTRAMUSCULAR; INTRAVENOUS at 08:31

## 2017-03-09 RX ADMIN — SODIUM CHLORIDE 20 ML/HR: 0.9 INJECTION, SOLUTION INTRAVENOUS at 08:30

## 2017-03-09 NOTE — PLAN OF CARE
Problem: Potential for Falls  Goal: Patient will remain free of falls  INTERVENTIONS:  - Assess patient frequently for physical needs  - Identify cognitive and physical deficits and behaviors that affect risk of falls    - [place] fall precautions as indicated by assessment   - Educate patient/family on patient safety including physical limitations  - Instruct patient to call for assistance with activity based on assessment  - Modify environment to reduce risk of injury  - Consider OT/PT consult to assist with strengthening/mobility   Outcome: Progressing

## 2017-03-13 ENCOUNTER — TRANSCRIBE ORDERS (OUTPATIENT)
Dept: ADMINISTRATIVE | Facility: HOSPITAL | Age: 62
End: 2017-03-13

## 2017-03-13 ENCOUNTER — LAB (OUTPATIENT)
Dept: LAB | Facility: MEDICAL CENTER | Age: 62
End: 2017-03-13
Payer: COMMERCIAL

## 2017-03-13 DIAGNOSIS — D58.9 HEREDITARY HEMOLYTIC ANEMIA, UNSPECIFIED (HCC): Primary | ICD-10-CM

## 2017-03-13 DIAGNOSIS — D58.9 HEREDITARY HEMOLYTIC ANEMIA, UNSPECIFIED (HCC): ICD-10-CM

## 2017-03-13 LAB
BASOPHILS # BLD AUTO: 0.02 THOUSANDS/ΜL (ref 0–0.1)
BASOPHILS NFR BLD AUTO: 0 % (ref 0–1)
EOSINOPHIL # BLD AUTO: 0.19 THOUSAND/ΜL (ref 0–0.61)
EOSINOPHIL NFR BLD AUTO: 3 % (ref 0–6)
ERYTHROCYTE [DISTWIDTH] IN BLOOD BY AUTOMATED COUNT: 13.9 % (ref 11.6–15.1)
HCT VFR BLD AUTO: 33.9 % (ref 36.5–49.3)
HGB BLD-MCNC: 11.7 G/DL (ref 12–17)
LYMPHOCYTES # BLD AUTO: 1.58 THOUSANDS/ΜL (ref 0.6–4.47)
LYMPHOCYTES NFR BLD AUTO: 24 % (ref 14–44)
MCH RBC QN AUTO: 36.3 PG (ref 26.8–34.3)
MCHC RBC AUTO-ENTMCNC: 34.5 G/DL (ref 31.4–37.4)
MCV RBC AUTO: 105 FL (ref 82–98)
MONOCYTES # BLD AUTO: 0.49 THOUSAND/ΜL (ref 0.17–1.22)
MONOCYTES NFR BLD AUTO: 8 % (ref 4–12)
NEUTROPHILS # BLD AUTO: 4.21 THOUSANDS/ΜL (ref 1.85–7.62)
NEUTS SEG NFR BLD AUTO: 65 % (ref 43–75)
NRBC BLD AUTO-RTO: 0 /100 WBCS
PLATELET # BLD AUTO: 184 THOUSANDS/UL (ref 149–390)
PMV BLD AUTO: 10.7 FL (ref 8.9–12.7)
RBC # BLD AUTO: 3.22 MILLION/UL (ref 3.88–5.62)
WBC # BLD AUTO: 6.5 THOUSAND/UL (ref 4.31–10.16)

## 2017-03-13 PROCEDURE — 85025 COMPLETE CBC W/AUTO DIFF WBC: CPT

## 2017-03-13 PROCEDURE — 36415 COLL VENOUS BLD VENIPUNCTURE: CPT

## 2017-03-15 ENCOUNTER — GENERIC CONVERSION - ENCOUNTER (OUTPATIENT)
Dept: OTHER | Facility: OTHER | Age: 62
End: 2017-03-15

## 2017-03-16 RX ORDER — SODIUM CHLORIDE 9 MG/ML
20 INJECTION, SOLUTION INTRAVENOUS CONTINUOUS
Status: DISCONTINUED | OUTPATIENT
Start: 2017-03-17 | End: 2017-03-20 | Stop reason: HOSPADM

## 2017-03-16 RX ORDER — ACETAMINOPHEN 325 MG/1
650 TABLET ORAL ONCE
Status: COMPLETED | OUTPATIENT
Start: 2017-03-17 | End: 2017-03-17

## 2017-03-17 ENCOUNTER — HOSPITAL ENCOUNTER (OUTPATIENT)
Dept: INFUSION CENTER | Facility: CLINIC | Age: 62
Discharge: HOME | End: 2017-03-17
Payer: COMMERCIAL

## 2017-03-17 VITALS
BODY MASS INDEX: 30.62 KG/M2 | TEMPERATURE: 98.3 F | SYSTOLIC BLOOD PRESSURE: 125 MMHG | HEART RATE: 83 BPM | RESPIRATION RATE: 18 BRPM | DIASTOLIC BLOOD PRESSURE: 87 MMHG | HEIGHT: 67 IN | WEIGHT: 195.11 LBS

## 2017-03-17 PROCEDURE — 96367 TX/PROPH/DG ADDL SEQ IV INF: CPT

## 2017-03-17 PROCEDURE — 96413 CHEMO IV INFUSION 1 HR: CPT

## 2017-03-17 PROCEDURE — 96415 CHEMO IV INFUSION ADDL HR: CPT

## 2017-03-17 RX ADMIN — ACETAMINOPHEN 650 MG: 325 TABLET, FILM COATED ORAL at 08:41

## 2017-03-17 RX ADMIN — RITUXIMAB 758 MG: 10 INJECTION, SOLUTION INTRAVENOUS at 09:09

## 2017-03-17 RX ADMIN — SODIUM CHLORIDE 20 ML/HR: 0.9 INJECTION, SOLUTION INTRAVENOUS at 08:41

## 2017-03-17 RX ADMIN — DIPHENHYDRAMINE HYDROCHLORIDE 25 MG: 50 INJECTION, SOLUTION INTRAMUSCULAR; INTRAVENOUS at 08:41

## 2017-03-17 NOTE — PLAN OF CARE
Problem: Potential for Falls  Goal: Patient will remain free of falls  INTERVENTIONS:  - Assess patient frequently for physical needs  - Identify cognitive and physical deficits and behaviors that affect risk of falls  - [place] fall precautions as indicated by assessment   - Educate patient/family on patient safety including physical limitations  - Instruct patient to call for assistance with activity based on assessment  - Modify environment to reduce risk of injury  - Consider OT/PT consult to assist with strengthening/mobility   Outcome: Progressing    Problem: SAFETY ADULT  Goal: Patient will remain free of falls  INTERVENTIONS:  - Assess patient frequently for physical needs  - Identify cognitive and physical deficits and behaviors that affect risk of falls  - [place] fall precautions as indicated by assessment   - Educate patient/family on patient safety including physical limitations  - Instruct patient to call for assistance with activity based on assessment  - Modify environment to reduce risk of injury  - Consider OT/PT consult to assist with strengthening/mobility   Outcome: Progressing    Problem: Knowledge Deficit  Goal: Patient/family/caregiver demonstrates understanding of disease process, treatment plan, medications, and discharge instructions  Complete learning assessment and assess knowledge base    Interventions:  - Provide teaching at level of understanding  - Provide teaching via preferred learning methods  Outcome: Progressing

## 2017-03-17 NOTE — PROGRESS NOTES
Patient tolerated rituxan infusion today without complications  Aware of next appointment   Declined AVS

## 2017-03-22 RX ORDER — ACETAMINOPHEN 325 MG/1
650 TABLET ORAL ONCE
Status: COMPLETED | OUTPATIENT
Start: 2017-03-23 | End: 2017-03-23

## 2017-03-22 RX ORDER — SODIUM CHLORIDE 9 MG/ML
20 INJECTION, SOLUTION INTRAVENOUS CONTINUOUS
Status: DISCONTINUED | OUTPATIENT
Start: 2017-03-23 | End: 2017-03-26 | Stop reason: HOSPADM

## 2017-03-23 ENCOUNTER — GENERIC CONVERSION - ENCOUNTER (OUTPATIENT)
Dept: OTHER | Facility: OTHER | Age: 62
End: 2017-03-23

## 2017-03-23 ENCOUNTER — HOSPITAL ENCOUNTER (OUTPATIENT)
Dept: INFUSION CENTER | Facility: CLINIC | Age: 62
Discharge: HOME | End: 2017-03-23
Payer: COMMERCIAL

## 2017-03-23 VITALS
BODY MASS INDEX: 30.62 KG/M2 | RESPIRATION RATE: 16 BRPM | WEIGHT: 195.11 LBS | HEART RATE: 79 BPM | DIASTOLIC BLOOD PRESSURE: 65 MMHG | SYSTOLIC BLOOD PRESSURE: 123 MMHG | TEMPERATURE: 98.3 F

## 2017-03-23 LAB
ANISOCYTOSIS BLD QL SMEAR: PRESENT
BASOPHILS # BLD AUTO: 0.06 THOUSAND/UL (ref 0–0.1)
BASOPHILS NFR MAR MANUAL: 1 % (ref 0–1)
EOSINOPHIL # BLD AUTO: 0.19 THOUSAND/UL (ref 0–0.61)
EOSINOPHIL NFR BLD MANUAL: 3 % (ref 0–6)
ERYTHROCYTE [DISTWIDTH] IN BLOOD BY AUTOMATED COUNT: 14 % (ref 11.6–15.1)
HCT VFR BLD AUTO: 31.1 % (ref 36.5–49.3)
HGB BLD-MCNC: 10.7 G/DL (ref 12–17)
LYMPHOCYTES # BLD AUTO: 1.22 THOUSAND/UL (ref 0.6–4.47)
LYMPHOCYTES # BLD AUTO: 19 % (ref 14–44)
MACROCYTES BLD QL AUTO: PRESENT
MCH RBC QN AUTO: 36.5 PG (ref 26.8–34.3)
MCHC RBC AUTO-ENTMCNC: 34.5 G/DL (ref 31.4–37.4)
MCV RBC AUTO: 106 FL (ref 82–98)
MONOCYTES # BLD AUTO: 0.32 THOUSAND/UL (ref 0–1.22)
MONOCYTES NFR BLD AUTO: 5 % (ref 4–12)
NEUTS BAND NFR BLD MANUAL: 5 % (ref 0–8)
NEUTS SEG # BLD: 4.61 THOUSAND/UL (ref 1.81–6.82)
NEUTS SEG NFR BLD AUTO: 67 % (ref 43–75)
PLATELET # BLD AUTO: 249 THOUSANDS/UL (ref 149–390)
PLATELET BLD QL SMEAR: ADEQUATE
PMV BLD AUTO: 7 FL (ref 8.9–12.7)
RBC # BLD AUTO: 2.94 MILLION/UL (ref 3.88–5.62)
TOTAL CELLS COUNTED SPEC: 100
WBC # BLD AUTO: 6.4 THOUSAND/UL (ref 4.31–10.16)
WBC NRBC COR # BLD: 6.4 THOUSAND/UL (ref 4.31–10.16)

## 2017-03-23 PROCEDURE — 85027 COMPLETE CBC AUTOMATED: CPT | Performed by: INTERNAL MEDICINE

## 2017-03-23 PROCEDURE — 85007 BL SMEAR W/DIFF WBC COUNT: CPT | Performed by: INTERNAL MEDICINE

## 2017-03-23 PROCEDURE — 96375 TX/PRO/DX INJ NEW DRUG ADDON: CPT

## 2017-03-23 PROCEDURE — 96413 CHEMO IV INFUSION 1 HR: CPT

## 2017-03-23 PROCEDURE — 96415 CHEMO IV INFUSION ADDL HR: CPT

## 2017-03-23 RX ADMIN — RITUXIMAB 758 MG: 10 INJECTION, SOLUTION INTRAVENOUS at 09:02

## 2017-03-23 RX ADMIN — DIPHENHYDRAMINE HYDROCHLORIDE 25 MG: 50 INJECTION, SOLUTION INTRAMUSCULAR; INTRAVENOUS at 08:30

## 2017-03-23 RX ADMIN — SODIUM CHLORIDE 20 ML/HR: 0.9 INJECTION, SOLUTION INTRAVENOUS at 08:30

## 2017-03-23 RX ADMIN — ACETAMINOPHEN 650 MG: 325 TABLET, FILM COATED ORAL at 08:13

## 2017-03-23 NOTE — PROGRESS NOTES
Patient tolerated Rituxan well with no complications  Pt is aware of next appointment   Refused AVS

## 2017-03-29 RX ORDER — SODIUM CHLORIDE 9 MG/ML
20 INJECTION, SOLUTION INTRAVENOUS CONTINUOUS
Status: DISCONTINUED | OUTPATIENT
Start: 2017-03-30 | End: 2017-04-02 | Stop reason: HOSPADM

## 2017-03-29 RX ORDER — ACETAMINOPHEN 325 MG/1
650 TABLET ORAL ONCE
Status: COMPLETED | OUTPATIENT
Start: 2017-03-30 | End: 2017-03-30

## 2017-03-30 ENCOUNTER — HOSPITAL ENCOUNTER (OUTPATIENT)
Dept: INFUSION CENTER | Facility: CLINIC | Age: 62
Discharge: HOME | End: 2017-03-30
Payer: COMMERCIAL

## 2017-03-30 ENCOUNTER — GENERIC CONVERSION - ENCOUNTER (OUTPATIENT)
Dept: OTHER | Facility: OTHER | Age: 62
End: 2017-03-30

## 2017-03-30 VITALS
TEMPERATURE: 98.2 F | RESPIRATION RATE: 18 BRPM | SYSTOLIC BLOOD PRESSURE: 122 MMHG | HEART RATE: 60 BPM | DIASTOLIC BLOOD PRESSURE: 72 MMHG

## 2017-03-30 LAB
ANISOCYTOSIS BLD QL SMEAR: PRESENT
BASOPHILS # BLD AUTO: 0.06 THOUSAND/UL (ref 0–0.1)
BASOPHILS NFR MAR MANUAL: 1 % (ref 0–1)
EOSINOPHIL # BLD AUTO: 0.12 THOUSAND/UL (ref 0–0.61)
EOSINOPHIL NFR BLD MANUAL: 2 % (ref 0–6)
ERYTHROCYTE [DISTWIDTH] IN BLOOD BY AUTOMATED COUNT: 15.6 % (ref 11.6–15.1)
HCT VFR BLD AUTO: 28.4 % (ref 36.5–49.3)
HGB BLD-MCNC: 9.9 G/DL (ref 12–17)
LYMPHOCYTES # BLD AUTO: 0.94 THOUSAND/UL (ref 0.6–4.47)
LYMPHOCYTES # BLD AUTO: 16 % (ref 14–44)
MACROCYTES BLD QL AUTO: PRESENT
MCH RBC QN AUTO: 37.9 PG (ref 26.8–34.3)
MCHC RBC AUTO-ENTMCNC: 34.7 G/DL (ref 31.4–37.4)
MCV RBC AUTO: 109 FL (ref 82–98)
MONOCYTES # BLD AUTO: 0.18 THOUSAND/UL (ref 0–1.22)
MONOCYTES NFR BLD AUTO: 3 % (ref 4–12)
NEUTS BAND NFR BLD MANUAL: 3 % (ref 0–8)
NEUTS SEG # BLD: 4.6 THOUSAND/UL (ref 1.81–6.82)
NEUTS SEG NFR BLD AUTO: 75 % (ref 43–75)
PLATELET # BLD AUTO: 224 THOUSANDS/UL (ref 149–390)
PLATELET BLD QL SMEAR: ADEQUATE
PMV BLD AUTO: 7.2 FL (ref 8.9–12.7)
RBC # BLD AUTO: 2.6 MILLION/UL (ref 3.88–5.62)
TOTAL CELLS COUNTED SPEC: 100
WBC # BLD AUTO: 5.9 THOUSAND/UL (ref 4.31–10.16)
WBC NRBC COR # BLD: 5.9 THOUSAND/UL (ref 4.31–10.16)

## 2017-03-30 PROCEDURE — 85027 COMPLETE CBC AUTOMATED: CPT | Performed by: INTERNAL MEDICINE

## 2017-03-30 PROCEDURE — 96415 CHEMO IV INFUSION ADDL HR: CPT

## 2017-03-30 PROCEDURE — 85007 BL SMEAR W/DIFF WBC COUNT: CPT | Performed by: INTERNAL MEDICINE

## 2017-03-30 PROCEDURE — 96367 TX/PROPH/DG ADDL SEQ IV INF: CPT

## 2017-03-30 PROCEDURE — 96413 CHEMO IV INFUSION 1 HR: CPT

## 2017-03-30 RX ADMIN — SODIUM CHLORIDE 20 ML/HR: 0.9 INJECTION, SOLUTION INTRAVENOUS at 08:22

## 2017-03-30 RX ADMIN — DIPHENHYDRAMINE HYDROCHLORIDE 25 MG: 50 INJECTION, SOLUTION INTRAMUSCULAR; INTRAVENOUS at 08:22

## 2017-03-30 RX ADMIN — RITUXIMAB 758 MG: 10 INJECTION, SOLUTION INTRAVENOUS at 08:57

## 2017-03-30 RX ADMIN — ACETAMINOPHEN 650 MG: 325 TABLET, FILM COATED ORAL at 08:09

## 2017-03-30 NOTE — PROGRESS NOTES
Pt arrived to unit without complaint, received 4th Rituxan infusion without incident, tolerated well  Pt left unit in stable condition without question or concern  He will f/u with Dr Angella Hammer to determine long range management of his hemolytic anemia  CBC drawn today, Hgb=9 9, Dr Angella Hammer made aware via phone call to [facility] by the pt

## 2017-03-30 NOTE — PLAN OF CARE
Problem: Potential for Falls  Goal: Patient will remain free of falls  INTERVENTIONS:  - Assess patient frequently for physical needs  - Identify cognitive and physical deficits and behaviors that affect risk of falls    - [place] fall precautions as indicated by assessment   - Educate patient/family on patient safety including physical limitations  - Instruct patient to call for assistance with activity based on assessment  - Modify environment to reduce risk of injury  - Consider OT/PT consult to assist with strengthening/mobility   Outcome: Progressing

## 2017-04-07 ENCOUNTER — LAB (OUTPATIENT)
Dept: LAB | Facility: MEDICAL CENTER | Age: 62
End: 2017-04-07
Payer: COMMERCIAL

## 2017-04-07 ENCOUNTER — GENERIC CONVERSION - ENCOUNTER (OUTPATIENT)
Dept: OTHER | Facility: OTHER | Age: 62
End: 2017-04-07

## 2017-04-07 DIAGNOSIS — D58.9 HEREDITARY HEMOLYTIC ANEMIA, UNSPECIFIED (HCC): ICD-10-CM

## 2017-04-07 LAB
BASOPHILS # BLD AUTO: 0.03 THOUSANDS/ΜL (ref 0–0.1)
BASOPHILS NFR BLD AUTO: 0 % (ref 0–1)
EOSINOPHIL # BLD AUTO: 0.21 THOUSAND/ΜL (ref 0–0.61)
EOSINOPHIL NFR BLD AUTO: 3 % (ref 0–6)
ERYTHROCYTE [DISTWIDTH] IN BLOOD BY AUTOMATED COUNT: 17.9 % (ref 11.6–15.1)
HCT VFR BLD AUTO: 27.6 % (ref 36.5–49.3)
HGB BLD-MCNC: 9 G/DL (ref 12–17)
LYMPHOCYTES # BLD AUTO: 1.14 THOUSANDS/ΜL (ref 0.6–4.47)
LYMPHOCYTES NFR BLD AUTO: 16 % (ref 14–44)
MCH RBC QN AUTO: 39 PG (ref 26.8–34.3)
MCHC RBC AUTO-ENTMCNC: 32.6 G/DL (ref 31.4–37.4)
MCV RBC AUTO: 120 FL (ref 82–98)
MONOCYTES # BLD AUTO: 0.27 THOUSAND/ΜL (ref 0.17–1.22)
MONOCYTES NFR BLD AUTO: 4 % (ref 4–12)
NEUTROPHILS # BLD AUTO: 5.34 THOUSANDS/ΜL (ref 1.85–7.62)
NEUTS SEG NFR BLD AUTO: 77 % (ref 43–75)
NRBC BLD AUTO-RTO: 0 /100 WBCS
PLATELET # BLD AUTO: 209 THOUSANDS/UL (ref 149–390)
PMV BLD AUTO: 10.3 FL (ref 8.9–12.7)
RBC # BLD AUTO: 2.31 MILLION/UL (ref 3.88–5.62)
WBC # BLD AUTO: 7.01 THOUSAND/UL (ref 4.31–10.16)

## 2017-04-07 PROCEDURE — 85025 COMPLETE CBC W/AUTO DIFF WBC: CPT

## 2017-04-07 PROCEDURE — 36415 COLL VENOUS BLD VENIPUNCTURE: CPT

## 2017-04-13 ENCOUNTER — TRANSCRIBE ORDERS (OUTPATIENT)
Dept: ADMINISTRATIVE | Facility: HOSPITAL | Age: 62
End: 2017-04-13

## 2017-04-13 ENCOUNTER — GENERIC CONVERSION - ENCOUNTER (OUTPATIENT)
Dept: OTHER | Facility: OTHER | Age: 62
End: 2017-04-13

## 2017-04-13 ENCOUNTER — LAB (OUTPATIENT)
Dept: LAB | Facility: MEDICAL CENTER | Age: 62
End: 2017-04-13
Payer: COMMERCIAL

## 2017-04-13 DIAGNOSIS — D58.9 HEREDITARY HEMOLYTIC ANEMIA, UNSPECIFIED (HCC): ICD-10-CM

## 2017-04-13 DIAGNOSIS — D58.9 HEREDITARY HEMOLYTIC ANEMIA, UNSPECIFIED (HCC): Primary | ICD-10-CM

## 2017-04-13 LAB
BASOPHILS # BLD AUTO: 0.01 THOUSANDS/ΜL (ref 0–0.1)
BASOPHILS NFR BLD AUTO: 0 % (ref 0–1)
EOSINOPHIL # BLD AUTO: 0.08 THOUSAND/ΜL (ref 0–0.61)
EOSINOPHIL NFR BLD AUTO: 2 % (ref 0–6)
ERYTHROCYTE [DISTWIDTH] IN BLOOD BY AUTOMATED COUNT: 18.9 % (ref 11.6–15.1)
HCT VFR BLD AUTO: 25.4 % (ref 36.5–49.3)
HGB BLD-MCNC: 8.2 G/DL (ref 12–17)
LYMPHOCYTES # BLD AUTO: 0.71 THOUSANDS/ΜL (ref 0.6–4.47)
LYMPHOCYTES NFR BLD AUTO: 15 % (ref 14–44)
MCH RBC QN AUTO: 40.6 PG (ref 26.8–34.3)
MCHC RBC AUTO-ENTMCNC: 32.3 G/DL (ref 31.4–37.4)
MCV RBC AUTO: 126 FL (ref 82–98)
MONOCYTES # BLD AUTO: 0.4 THOUSAND/ΜL (ref 0.17–1.22)
MONOCYTES NFR BLD AUTO: 8 % (ref 4–12)
NEUTROPHILS # BLD AUTO: 3.55 THOUSANDS/ΜL (ref 1.85–7.62)
NEUTS SEG NFR BLD AUTO: 75 % (ref 43–75)
NRBC BLD AUTO-RTO: 1 /100 WBCS
PLATELET # BLD AUTO: 215 THOUSANDS/UL (ref 149–390)
PMV BLD AUTO: 10.4 FL (ref 8.9–12.7)
RBC # BLD AUTO: 2.02 MILLION/UL (ref 3.88–5.62)
WBC # BLD AUTO: 4.82 THOUSAND/UL (ref 4.31–10.16)

## 2017-04-13 PROCEDURE — 85025 COMPLETE CBC W/AUTO DIFF WBC: CPT

## 2017-04-13 PROCEDURE — 36415 COLL VENOUS BLD VENIPUNCTURE: CPT

## 2017-04-19 ENCOUNTER — LAB (OUTPATIENT)
Dept: LAB | Facility: MEDICAL CENTER | Age: 62
End: 2017-04-19
Payer: COMMERCIAL

## 2017-04-19 ENCOUNTER — ALLSCRIPTS OFFICE VISIT (OUTPATIENT)
Dept: OTHER | Facility: OTHER | Age: 62
End: 2017-04-19

## 2017-04-19 DIAGNOSIS — D58.9 HEREDITARY HEMOLYTIC ANEMIA, UNSPECIFIED (HCC): ICD-10-CM

## 2017-04-19 LAB
BASOPHILS # BLD AUTO: 0.01 THOUSANDS/ΜL (ref 0–0.1)
BASOPHILS NFR BLD AUTO: 0 % (ref 0–1)
EOSINOPHIL # BLD AUTO: 0.12 THOUSAND/ΜL (ref 0–0.61)
EOSINOPHIL NFR BLD AUTO: 2 % (ref 0–6)
ERYTHROCYTE [DISTWIDTH] IN BLOOD BY AUTOMATED COUNT: 17.2 % (ref 11.6–15.1)
HCT VFR BLD AUTO: 28.1 % (ref 36.5–49.3)
HGB BLD-MCNC: 9.1 G/DL (ref 12–17)
LYMPHOCYTES # BLD AUTO: 1.46 THOUSANDS/ΜL (ref 0.6–4.47)
LYMPHOCYTES NFR BLD AUTO: 28 % (ref 14–44)
MCH RBC QN AUTO: 40.4 PG (ref 26.8–34.3)
MCHC RBC AUTO-ENTMCNC: 32.4 G/DL (ref 31.4–37.4)
MCV RBC AUTO: 125 FL (ref 82–98)
MONOCYTES # BLD AUTO: 0.39 THOUSAND/ΜL (ref 0.17–1.22)
MONOCYTES NFR BLD AUTO: 7 % (ref 4–12)
NEUTROPHILS # BLD AUTO: 3.25 THOUSANDS/ΜL (ref 1.85–7.62)
NEUTS SEG NFR BLD AUTO: 63 % (ref 43–75)
NRBC BLD AUTO-RTO: 0 /100 WBCS
PLATELET # BLD AUTO: 227 THOUSANDS/UL (ref 149–390)
PMV BLD AUTO: 10.5 FL (ref 8.9–12.7)
RBC # BLD AUTO: 2.25 MILLION/UL (ref 3.88–5.62)
WBC # BLD AUTO: 5.25 THOUSAND/UL (ref 4.31–10.16)

## 2017-04-19 PROCEDURE — 36415 COLL VENOUS BLD VENIPUNCTURE: CPT

## 2017-04-19 PROCEDURE — 85025 COMPLETE CBC W/AUTO DIFF WBC: CPT

## 2017-04-24 ENCOUNTER — GENERIC CONVERSION - ENCOUNTER (OUTPATIENT)
Dept: OTHER | Facility: OTHER | Age: 62
End: 2017-04-24

## 2017-04-27 ENCOUNTER — GENERIC CONVERSION - ENCOUNTER (OUTPATIENT)
Dept: OTHER | Facility: OTHER | Age: 62
End: 2017-04-27

## 2017-04-27 ENCOUNTER — LAB (OUTPATIENT)
Dept: LAB | Facility: MEDICAL CENTER | Age: 62
End: 2017-04-27
Payer: COMMERCIAL

## 2017-04-27 DIAGNOSIS — D58.9 HEREDITARY HEMOLYTIC ANEMIA, UNSPECIFIED (HCC): ICD-10-CM

## 2017-04-27 LAB
BASOPHILS # BLD AUTO: 0.02 THOUSANDS/ΜL (ref 0–0.1)
BASOPHILS NFR BLD AUTO: 0 % (ref 0–1)
EOSINOPHIL # BLD AUTO: 0.13 THOUSAND/ΜL (ref 0–0.61)
EOSINOPHIL NFR BLD AUTO: 3 % (ref 0–6)
ERYTHROCYTE [DISTWIDTH] IN BLOOD BY AUTOMATED COUNT: 14.2 % (ref 11.6–15.1)
HCT VFR BLD AUTO: 30.4 % (ref 36.5–49.3)
HGB BLD-MCNC: 10.2 G/DL (ref 12–17)
LYMPHOCYTES # BLD AUTO: 1.37 THOUSANDS/ΜL (ref 0.6–4.47)
LYMPHOCYTES NFR BLD AUTO: 29 % (ref 14–44)
MCH RBC QN AUTO: 41.1 PG (ref 26.8–34.3)
MCHC RBC AUTO-ENTMCNC: 33.6 G/DL (ref 31.4–37.4)
MCV RBC AUTO: 123 FL (ref 82–98)
MONOCYTES # BLD AUTO: 0.38 THOUSAND/ΜL (ref 0.17–1.22)
MONOCYTES NFR BLD AUTO: 8 % (ref 4–12)
NEUTROPHILS # BLD AUTO: 2.9 THOUSANDS/ΜL (ref 1.85–7.62)
NEUTS SEG NFR BLD AUTO: 60 % (ref 43–75)
NRBC BLD AUTO-RTO: 0 /100 WBCS
PLATELET # BLD AUTO: 234 THOUSANDS/UL (ref 149–390)
PMV BLD AUTO: 10.5 FL (ref 8.9–12.7)
RBC # BLD AUTO: 2.48 MILLION/UL (ref 3.88–5.62)
WBC # BLD AUTO: 4.81 THOUSAND/UL (ref 4.31–10.16)

## 2017-04-27 PROCEDURE — 85025 COMPLETE CBC W/AUTO DIFF WBC: CPT

## 2017-04-27 PROCEDURE — 36415 COLL VENOUS BLD VENIPUNCTURE: CPT

## 2017-05-02 ENCOUNTER — TRANSCRIBE ORDERS (OUTPATIENT)
Dept: ADMINISTRATIVE | Facility: HOSPITAL | Age: 62
End: 2017-05-02

## 2017-05-02 ENCOUNTER — ALLSCRIPTS OFFICE VISIT (OUTPATIENT)
Dept: OTHER | Facility: OTHER | Age: 62
End: 2017-05-02

## 2017-05-02 ENCOUNTER — GENERIC CONVERSION - ENCOUNTER (OUTPATIENT)
Dept: OTHER | Facility: OTHER | Age: 62
End: 2017-05-02

## 2017-05-02 DIAGNOSIS — D58.9 HEREDITARY HEMOLYTIC ANEMIA, UNSPECIFIED (HCC): Primary | ICD-10-CM

## 2017-05-04 ENCOUNTER — HOSPITAL ENCOUNTER (OUTPATIENT)
Dept: CT IMAGING | Facility: HOSPITAL | Age: 62
Discharge: HOME | End: 2017-05-04
Attending: SURGERY
Payer: COMMERCIAL

## 2017-05-04 ENCOUNTER — GENERIC CONVERSION - ENCOUNTER (OUTPATIENT)
Dept: OTHER | Facility: OTHER | Age: 62
End: 2017-05-04

## 2017-05-04 ENCOUNTER — LAB (OUTPATIENT)
Dept: LAB | Facility: HOSPITAL | Age: 62
End: 2017-05-04
Attending: SURGERY
Payer: COMMERCIAL

## 2017-05-04 ENCOUNTER — TRANSCRIBE ORDERS (OUTPATIENT)
Dept: LAB | Facility: HOSPITAL | Age: 62
End: 2017-05-04

## 2017-05-04 DIAGNOSIS — Z01.818 PREOP EXAMINATION: ICD-10-CM

## 2017-05-04 DIAGNOSIS — D58.9 HEREDITARY HEMOLYTIC ANEMIA, UNSPECIFIED (HCC): ICD-10-CM

## 2017-05-04 DIAGNOSIS — D58.9 HEREDITARY HEMOLYTIC ANEMIA (HCC): ICD-10-CM

## 2017-05-04 DIAGNOSIS — D58.9 HEREDITARY HEMOLYTIC ANEMIA, UNSPECIFIED (HCC): Primary | ICD-10-CM

## 2017-05-04 DIAGNOSIS — Z01.812 PRE-OPERATIVE LABORATORY EXAMINATION: ICD-10-CM

## 2017-05-04 LAB
ABO GROUP BLD: NORMAL
ALBUMIN SERPL BCP-MCNC: 4.2 G/DL (ref 3.5–5)
ALP SERPL-CCNC: 60 U/L (ref 46–116)
ALT SERPL W P-5'-P-CCNC: 33 U/L (ref 12–78)
ANION GAP SERPL CALCULATED.3IONS-SCNC: 8 MMOL/L (ref 4–13)
APTT PPP: 22 SECONDS (ref 23–35)
AST SERPL W P-5'-P-CCNC: 23 U/L (ref 5–45)
BASOPHILS # BLD AUTO: 0.02 THOUSANDS/ΜL (ref 0–0.1)
BASOPHILS NFR BLD AUTO: 0 % (ref 0–1)
BILIRUB SERPL-MCNC: 2.68 MG/DL (ref 0.2–1)
BLD GP AB SCN SERPL QL: POSITIVE
BLOOD GROUP ANTIBODIES SERPL: NORMAL
BUN SERPL-MCNC: 16 MG/DL (ref 5–25)
CALCIUM SERPL-MCNC: 8.7 MG/DL (ref 8.3–10.1)
CHLORIDE SERPL-SCNC: 104 MMOL/L (ref 100–108)
CO2 SERPL-SCNC: 29 MMOL/L (ref 21–32)
CREAT SERPL-MCNC: 0.89 MG/DL (ref 0.6–1.3)
DAT C3-SP REAG RBC QL: NORMAL
DAT IGG-SP REAG RBCCO QL: NORMAL
DAT POLY-SP REAG RBC QL: NORMAL
EOSINOPHIL # BLD AUTO: 0.06 THOUSAND/ΜL (ref 0–0.61)
EOSINOPHIL NFR BLD AUTO: 1 % (ref 0–6)
ERYTHROCYTE [DISTWIDTH] IN BLOOD BY AUTOMATED COUNT: 13.1 % (ref 11.6–15.1)
GFR SERPL CREATININE-BSD FRML MDRD: >60 ML/MIN/1.73SQ M
GLUCOSE SERPL-MCNC: 85 MG/DL (ref 65–140)
HCT VFR BLD AUTO: 33.7 % (ref 36.5–49.3)
HGB BLD-MCNC: 11.1 G/DL (ref 12–17)
INR PPP: 0.97 (ref 0.86–1.16)
LYMPHOCYTES # BLD AUTO: 0.75 THOUSANDS/ΜL (ref 0.6–4.47)
LYMPHOCYTES NFR BLD AUTO: 13 % (ref 14–44)
MCH RBC QN AUTO: 39.1 PG (ref 26.8–34.3)
MCHC RBC AUTO-ENTMCNC: 32.9 G/DL (ref 31.4–37.4)
MCV RBC AUTO: 119 FL (ref 82–98)
MONOCYTES # BLD AUTO: 0.37 THOUSAND/ΜL (ref 0.17–1.22)
MONOCYTES NFR BLD AUTO: 6 % (ref 4–12)
NEUTROPHILS # BLD AUTO: 4.69 THOUSANDS/ΜL (ref 1.85–7.62)
NEUTS SEG NFR BLD AUTO: 80 % (ref 43–75)
NRBC BLD AUTO-RTO: 0 /100 WBCS
PLATELET # BLD AUTO: 227 THOUSANDS/UL (ref 149–390)
PMV BLD AUTO: 9.8 FL (ref 8.9–12.7)
POTASSIUM SERPL-SCNC: 3.4 MMOL/L (ref 3.5–5.3)
PROT SERPL-MCNC: 6.8 G/DL (ref 6.4–8.2)
PROTHROMBIN TIME: 12.9 SECONDS (ref 12.1–14.4)
RBC # BLD AUTO: 2.84 MILLION/UL (ref 3.88–5.62)
RH BLD: POSITIVE
SODIUM SERPL-SCNC: 141 MMOL/L (ref 136–145)
SPECIMEN EXPIRATION DATE: NORMAL
WBC # BLD AUTO: 5.92 THOUSAND/UL (ref 4.31–10.16)

## 2017-05-04 PROCEDURE — 86880 COOMBS TEST DIRECT: CPT

## 2017-05-04 PROCEDURE — 86870 RBC ANTIBODY IDENTIFICATION: CPT

## 2017-05-04 PROCEDURE — 74177 CT ABD & PELVIS W/CONTRAST: CPT

## 2017-05-04 PROCEDURE — 85730 THROMBOPLASTIN TIME PARTIAL: CPT

## 2017-05-04 PROCEDURE — 80053 COMPREHEN METABOLIC PANEL: CPT

## 2017-05-04 PROCEDURE — 36415 COLL VENOUS BLD VENIPUNCTURE: CPT

## 2017-05-04 PROCEDURE — 86901 BLOOD TYPING SEROLOGIC RH(D): CPT

## 2017-05-04 PROCEDURE — 86850 RBC ANTIBODY SCREEN: CPT

## 2017-05-04 PROCEDURE — 85025 COMPLETE CBC W/AUTO DIFF WBC: CPT

## 2017-05-04 PROCEDURE — 86900 BLOOD TYPING SEROLOGIC ABO: CPT

## 2017-05-04 PROCEDURE — 85610 PROTHROMBIN TIME: CPT

## 2017-05-04 RX ADMIN — IOHEXOL 100 ML: 350 INJECTION, SOLUTION INTRAVENOUS at 10:15

## 2017-05-05 ENCOUNTER — ALLSCRIPTS OFFICE VISIT (OUTPATIENT)
Dept: OTHER | Facility: OTHER | Age: 62
End: 2017-05-05

## 2017-05-08 ENCOUNTER — GENERIC CONVERSION - ENCOUNTER (OUTPATIENT)
Dept: OTHER | Facility: OTHER | Age: 62
End: 2017-05-08

## 2017-05-11 ENCOUNTER — GENERIC CONVERSION - ENCOUNTER (OUTPATIENT)
Dept: OTHER | Facility: OTHER | Age: 62
End: 2017-05-11

## 2017-05-11 ENCOUNTER — LAB (OUTPATIENT)
Dept: LAB | Facility: MEDICAL CENTER | Age: 62
End: 2017-05-11
Payer: COMMERCIAL

## 2017-05-11 DIAGNOSIS — D58.9 HEREDITARY HEMOLYTIC ANEMIA, UNSPECIFIED (HCC): ICD-10-CM

## 2017-05-11 LAB
BASOPHILS # BLD AUTO: 0.03 THOUSANDS/ΜL (ref 0–0.1)
BASOPHILS NFR BLD AUTO: 1 % (ref 0–1)
EOSINOPHIL # BLD AUTO: 0.17 THOUSAND/ΜL (ref 0–0.61)
EOSINOPHIL NFR BLD AUTO: 4 % (ref 0–6)
ERYTHROCYTE [DISTWIDTH] IN BLOOD BY AUTOMATED COUNT: 13.4 % (ref 11.6–15.1)
HCT VFR BLD AUTO: 30.9 % (ref 36.5–49.3)
HGB BLD-MCNC: 9.9 G/DL (ref 12–17)
LYMPHOCYTES # BLD AUTO: 1.42 THOUSANDS/ΜL (ref 0.6–4.47)
LYMPHOCYTES NFR BLD AUTO: 32 % (ref 14–44)
MCH RBC QN AUTO: 39.9 PG (ref 26.8–34.3)
MCHC RBC AUTO-ENTMCNC: 32 G/DL (ref 31.4–37.4)
MCV RBC AUTO: 125 FL (ref 82–98)
MONOCYTES # BLD AUTO: 0.44 THOUSAND/ΜL (ref 0.17–1.22)
MONOCYTES NFR BLD AUTO: 10 % (ref 4–12)
NEUTROPHILS # BLD AUTO: 2.4 THOUSANDS/ΜL (ref 1.85–7.62)
NEUTS SEG NFR BLD AUTO: 53 % (ref 43–75)
NRBC BLD AUTO-RTO: 0 /100 WBCS
PLATELET # BLD AUTO: 213 THOUSANDS/UL (ref 149–390)
PMV BLD AUTO: 10.3 FL (ref 8.9–12.7)
RBC # BLD AUTO: 2.48 MILLION/UL (ref 3.88–5.62)
WBC # BLD AUTO: 4.49 THOUSAND/UL (ref 4.31–10.16)

## 2017-05-11 PROCEDURE — 36415 COLL VENOUS BLD VENIPUNCTURE: CPT

## 2017-05-11 PROCEDURE — 85025 COMPLETE CBC W/AUTO DIFF WBC: CPT

## 2017-05-17 ENCOUNTER — HOSPITAL ENCOUNTER (INPATIENT)
Dept: RADIOLOGY | Facility: HOSPITAL | Age: 62
LOS: 5 days | Discharge: HOME | DRG: 800 | End: 2017-05-22
Attending: SURGERY | Admitting: SURGERY
Payer: COMMERCIAL

## 2017-05-17 DIAGNOSIS — R16.1 SPLENOMEGALY: ICD-10-CM

## 2017-05-17 DIAGNOSIS — D59.11 HEMOLYTIC ANEMIA DUE TO WARM ANTIBODY (HCC): Primary | ICD-10-CM

## 2017-05-17 DIAGNOSIS — D58.9 HEMOLYTIC ANEMIA (HCC): ICD-10-CM

## 2017-05-17 LAB
ABO GROUP BLD: NORMAL
BLD GP AB SCN SERPL QL: NEGATIVE
ERYTHROCYTE [DISTWIDTH] IN BLOOD BY AUTOMATED COUNT: 14.3 % (ref 11.6–15.1)
HCT VFR BLD AUTO: 30.6 % (ref 36.5–49.3)
HGB BLD-MCNC: 10.3 G/DL (ref 12–17)
MCH RBC QN AUTO: 40.2 PG (ref 26.8–34.3)
MCHC RBC AUTO-ENTMCNC: 33.7 G/DL (ref 31.4–37.4)
MCV RBC AUTO: 120 FL (ref 82–98)
PLATELET # BLD AUTO: 241 THOUSANDS/UL (ref 149–390)
PMV BLD AUTO: 9.8 FL (ref 8.9–12.7)
RBC # BLD AUTO: 2.56 MILLION/UL (ref 3.88–5.62)
RH BLD: POSITIVE
SPECIMEN EXPIRATION DATE: NORMAL
WBC # BLD AUTO: 12.27 THOUSAND/UL (ref 4.31–10.16)

## 2017-05-17 PROCEDURE — 04LY3DZ OCCLUSION OF LOWER ARTERY WITH INTRALUMINAL DEVICE, PERCUTANEOUS APPROACH: ICD-10-PCS | Performed by: RADIOLOGY

## 2017-05-17 PROCEDURE — 99152 MOD SED SAME PHYS/QHP 5/>YRS: CPT

## 2017-05-17 PROCEDURE — 94762 N-INVAS EAR/PLS OXIMTRY CONT: CPT

## 2017-05-17 PROCEDURE — 75898 FOLLOW-UP ANGIOGRAPHY: CPT

## 2017-05-17 PROCEDURE — 86900 BLOOD TYPING SEROLOGIC ABO: CPT | Performed by: SURGERY

## 2017-05-17 PROCEDURE — 86850 RBC ANTIBODY SCREEN: CPT | Performed by: SURGERY

## 2017-05-17 PROCEDURE — 36247 INS CATH ABD/L-EXT ART 3RD: CPT

## 2017-05-17 PROCEDURE — 86901 BLOOD TYPING SEROLOGIC RH(D): CPT | Performed by: SURGERY

## 2017-05-17 PROCEDURE — 86922 COMPATIBILITY TEST ANTIGLOB: CPT

## 2017-05-17 PROCEDURE — B413YZZ FLUOROSCOPY OF SPLENIC ARTERIES USING OTHER CONTRAST: ICD-10-PCS | Performed by: RADIOLOGY

## 2017-05-17 PROCEDURE — 99153 MOD SED SAME PHYS/QHP EA: CPT

## 2017-05-17 PROCEDURE — 04L43DZ OCCLUSION OF SPLENIC ARTERY WITH INTRALUMINAL DEVICE, PERCUTANEOUS APPROACH: ICD-10-PCS | Performed by: RADIOLOGY

## 2017-05-17 PROCEDURE — C1769 GUIDE WIRE: HCPCS

## 2017-05-17 PROCEDURE — A9270 NON-COVERED ITEM OR SERVICE: HCPCS | Performed by: SURGERY

## 2017-05-17 PROCEDURE — 86921 COMPATIBILITY TEST INCUBATE: CPT

## 2017-05-17 PROCEDURE — C1894 INTRO/SHEATH, NON-LASER: HCPCS

## 2017-05-17 PROCEDURE — 85027 COMPLETE CBC AUTOMATED: CPT | Performed by: SURGERY

## 2017-05-17 PROCEDURE — 37242 VASC EMBOLIZE/OCCLUDE ARTERY: CPT

## 2017-05-17 PROCEDURE — C1760 CLOSURE DEV, VASC: HCPCS

## 2017-05-17 PROCEDURE — C1887 CATHETER, GUIDING: HCPCS

## 2017-05-17 PROCEDURE — 76937 US GUIDE VASCULAR ACCESS: CPT

## 2017-05-17 RX ORDER — HYDROMORPHONE HYDROCHLORIDE 4 MG/ML
INJECTION, SOLUTION INTRAMUSCULAR; INTRAVENOUS; SUBCUTANEOUS CODE/TRAUMA/SEDATION MEDICATION
Status: COMPLETED | OUTPATIENT
Start: 2017-05-17 | End: 2017-05-17

## 2017-05-17 RX ORDER — ONDANSETRON 2 MG/ML
4 INJECTION INTRAMUSCULAR; INTRAVENOUS EVERY 6 HOURS PRN
Status: DISCONTINUED | OUTPATIENT
Start: 2017-05-17 | End: 2017-05-17

## 2017-05-17 RX ORDER — FENTANYL CITRATE 50 UG/ML
INJECTION, SOLUTION INTRAMUSCULAR; INTRAVENOUS CODE/TRAUMA/SEDATION MEDICATION
Status: COMPLETED | OUTPATIENT
Start: 2017-05-17 | End: 2017-05-17

## 2017-05-17 RX ORDER — HEPARIN SODIUM 5000 [USP'U]/ML
5000 INJECTION, SOLUTION INTRAVENOUS; SUBCUTANEOUS EVERY 8 HOURS SCHEDULED
Status: DISCONTINUED | OUTPATIENT
Start: 2017-05-17 | End: 2017-05-22 | Stop reason: HOSPADM

## 2017-05-17 RX ORDER — MIDAZOLAM HYDROCHLORIDE 1 MG/ML
INJECTION INTRAMUSCULAR; INTRAVENOUS CODE/TRAUMA/SEDATION MEDICATION
Status: COMPLETED | OUTPATIENT
Start: 2017-05-17 | End: 2017-05-17

## 2017-05-17 RX ORDER — ONDANSETRON 2 MG/ML
4 INJECTION INTRAMUSCULAR; INTRAVENOUS EVERY 4 HOURS PRN
Status: DISCONTINUED | OUTPATIENT
Start: 2017-05-17 | End: 2017-05-19

## 2017-05-17 RX ORDER — SODIUM CHLORIDE 9 MG/ML
75 INJECTION, SOLUTION INTRAVENOUS CONTINUOUS
Status: DISCONTINUED | OUTPATIENT
Start: 2017-05-17 | End: 2017-05-17

## 2017-05-17 RX ORDER — ACETAMINOPHEN 325 MG/1
650 TABLET ORAL EVERY 6 HOURS PRN
Status: DISCONTINUED | OUTPATIENT
Start: 2017-05-17 | End: 2017-05-22 | Stop reason: HOSPADM

## 2017-05-17 RX ORDER — SODIUM CHLORIDE, SODIUM LACTATE, POTASSIUM CHLORIDE, CALCIUM CHLORIDE 600; 310; 30; 20 MG/100ML; MG/100ML; MG/100ML; MG/100ML
100 INJECTION, SOLUTION INTRAVENOUS CONTINUOUS
Status: DISCONTINUED | OUTPATIENT
Start: 2017-05-18 | End: 2017-05-20

## 2017-05-17 RX ORDER — DIPHENHYDRAMINE HCL 25 MG
50 TABLET ORAL ONCE
Status: COMPLETED | OUTPATIENT
Start: 2017-05-17 | End: 2017-05-17

## 2017-05-17 RX ADMIN — FENTANYL CITRATE 25 MCG: 50 INJECTION, SOLUTION INTRAMUSCULAR; INTRAVENOUS at 13:26

## 2017-05-17 RX ADMIN — MIDAZOLAM HYDROCHLORIDE 0.5 MG: 1 INJECTION, SOLUTION INTRAMUSCULAR; INTRAVENOUS at 12:42

## 2017-05-17 RX ADMIN — FENTANYL CITRATE 25 MCG: 50 INJECTION, SOLUTION INTRAMUSCULAR; INTRAVENOUS at 13:00

## 2017-05-17 RX ADMIN — FENTANYL CITRATE 25 MCG: 50 INJECTION, SOLUTION INTRAMUSCULAR; INTRAVENOUS at 11:56

## 2017-05-17 RX ADMIN — FENTANYL CITRATE 50 MCG: 50 INJECTION, SOLUTION INTRAMUSCULAR; INTRAVENOUS at 11:34

## 2017-05-17 RX ADMIN — HYDROCORTISONE SODIUM SUCCINATE 100 MG: 100 INJECTION, POWDER, FOR SOLUTION INTRAMUSCULAR; INTRAVENOUS at 11:05

## 2017-05-17 RX ADMIN — MIDAZOLAM HYDROCHLORIDE 1 MG: 1 INJECTION, SOLUTION INTRAMUSCULAR; INTRAVENOUS at 11:34

## 2017-05-17 RX ADMIN — SODIUM CHLORIDE 75 ML/HR: 0.9 INJECTION, SOLUTION INTRAVENOUS at 07:59

## 2017-05-17 RX ADMIN — MIDAZOLAM HYDROCHLORIDE 1 MG: 1 INJECTION, SOLUTION INTRAMUSCULAR; INTRAVENOUS at 11:56

## 2017-05-17 RX ADMIN — Medication: at 19:25

## 2017-05-17 RX ADMIN — IODIXANOL 84 ML: 320 INJECTION, SOLUTION INTRAVASCULAR at 15:18

## 2017-05-17 RX ADMIN — SODIUM CHLORIDE, SODIUM LACTATE, POTASSIUM CHLORIDE, AND CALCIUM CHLORIDE 100 ML/HR: .6; .31; .03; .02 INJECTION, SOLUTION INTRAVENOUS at 23:58

## 2017-05-17 RX ADMIN — MIDAZOLAM HYDROCHLORIDE 1 MG: 1 INJECTION, SOLUTION INTRAMUSCULAR; INTRAVENOUS at 13:03

## 2017-05-17 RX ADMIN — HYDROMORPHONE HYDROCHLORIDE 1 MG: 4 INJECTION, SOLUTION INTRAMUSCULAR; INTRAVENOUS; SUBCUTANEOUS at 14:05

## 2017-05-17 RX ADMIN — DIPHENHYDRAMINE HCL 50 MG: 25 TABLET ORAL at 07:58

## 2017-05-17 RX ADMIN — MIDAZOLAM HYDROCHLORIDE 0.5 MG: 1 INJECTION, SOLUTION INTRAMUSCULAR; INTRAVENOUS at 12:33

## 2017-05-17 RX ADMIN — FENTANYL CITRATE 25 MCG: 50 INJECTION, SOLUTION INTRAMUSCULAR; INTRAVENOUS at 12:33

## 2017-05-17 RX ADMIN — MIDAZOLAM HYDROCHLORIDE 1 MG: 1 INJECTION, SOLUTION INTRAMUSCULAR; INTRAVENOUS at 11:21

## 2017-05-17 RX ADMIN — FENTANYL CITRATE 50 MCG: 50 INJECTION, SOLUTION INTRAMUSCULAR; INTRAVENOUS at 11:22

## 2017-05-17 RX ADMIN — HYDROMORPHONE HYDROCHLORIDE 1 MG: 4 INJECTION, SOLUTION INTRAMUSCULAR; INTRAVENOUS; SUBCUTANEOUS at 13:28

## 2017-05-17 RX ADMIN — ACETAMINOPHEN 650 MG: 325 TABLET, FILM COATED ORAL at 18:27

## 2017-05-18 ENCOUNTER — ANESTHESIA EVENT (OUTPATIENT)
Dept: PERIOP | Facility: HOSPITAL | Age: 62
DRG: 800 | End: 2017-05-18
Payer: COMMERCIAL

## 2017-05-18 ENCOUNTER — APPOINTMENT (OUTPATIENT)
Dept: RADIOLOGY | Facility: HOSPITAL | Age: 62
DRG: 800 | End: 2017-05-18
Attending: ANESTHESIOLOGY
Payer: COMMERCIAL

## 2017-05-18 ENCOUNTER — ANESTHESIA (OUTPATIENT)
Dept: PERIOP | Facility: HOSPITAL | Age: 62
DRG: 800 | End: 2017-05-18
Payer: COMMERCIAL

## 2017-05-18 LAB
ALBUMIN SERPL BCP-MCNC: 3.6 G/DL (ref 3.5–5)
ALP SERPL-CCNC: 55 U/L (ref 46–116)
ALT SERPL W P-5'-P-CCNC: 26 U/L (ref 12–78)
ANION GAP SERPL CALCULATED.3IONS-SCNC: 8 MMOL/L (ref 4–13)
AST SERPL W P-5'-P-CCNC: 41 U/L (ref 5–45)
BASE EXCESS BLDA CALC-SCNC: -2 MMOL/L (ref -2–3)
BASOPHILS # BLD AUTO: 0.01 THOUSANDS/ΜL (ref 0–0.1)
BASOPHILS # BLD AUTO: 0.02 THOUSANDS/ΜL (ref 0–0.1)
BASOPHILS NFR BLD AUTO: 0 % (ref 0–1)
BASOPHILS NFR BLD AUTO: 0 % (ref 0–1)
BILIRUB SERPL-MCNC: 1.68 MG/DL (ref 0.2–1)
BUN SERPL-MCNC: 15 MG/DL (ref 5–25)
CA-I BLD-SCNC: 1.19 MMOL/L (ref 1.12–1.32)
CALCIUM SERPL-MCNC: 8.5 MG/DL (ref 8.3–10.1)
CHLORIDE SERPL-SCNC: 104 MMOL/L (ref 100–108)
CO2 SERPL-SCNC: 28 MMOL/L (ref 21–32)
CREAT SERPL-MCNC: 0.75 MG/DL (ref 0.6–1.3)
EOSINOPHIL # BLD AUTO: 0 THOUSAND/ΜL (ref 0–0.61)
EOSINOPHIL # BLD AUTO: 0.03 THOUSAND/ΜL (ref 0–0.61)
EOSINOPHIL NFR BLD AUTO: 0 % (ref 0–6)
EOSINOPHIL NFR BLD AUTO: 0 % (ref 0–6)
ERYTHROCYTE [DISTWIDTH] IN BLOOD BY AUTOMATED COUNT: 14.2 % (ref 11.6–15.1)
ERYTHROCYTE [DISTWIDTH] IN BLOOD BY AUTOMATED COUNT: 14.5 % (ref 11.6–15.1)
GFR SERPL CREATININE-BSD FRML MDRD: >60 ML/MIN/1.73SQ M
GLUCOSE SERPL-MCNC: 119 MG/DL (ref 65–140)
GLUCOSE SERPL-MCNC: 173 MG/DL (ref 65–140)
GLUCOSE SERPL-MCNC: 200 MG/DL (ref 65–140)
HCO3 BLDA-SCNC: 24.5 MMOL/L (ref 22–28)
HCT VFR BLD AUTO: 25.4 % (ref 36.5–49.3)
HCT VFR BLD AUTO: 28.6 % (ref 36.5–49.3)
HCT VFR BLD CALC: 22 % (ref 36.5–49.3)
HGB BLD-MCNC: 8.5 G/DL (ref 12–17)
HGB BLD-MCNC: 9.3 G/DL (ref 12–17)
HGB BLDA-MCNC: 7.5 G/DL (ref 12–17)
LYMPHOCYTES # BLD AUTO: 0.7 THOUSANDS/ΜL (ref 0.6–4.47)
LYMPHOCYTES # BLD AUTO: 1.7 THOUSANDS/ΜL (ref 0.6–4.47)
LYMPHOCYTES NFR BLD AUTO: 12 % (ref 14–44)
LYMPHOCYTES NFR BLD AUTO: 4 % (ref 14–44)
MAGNESIUM SERPL-MCNC: 2 MG/DL (ref 1.6–2.6)
MCH RBC QN AUTO: 38.8 PG (ref 26.8–34.3)
MCH RBC QN AUTO: 40.1 PG (ref 26.8–34.3)
MCHC RBC AUTO-ENTMCNC: 32.5 G/DL (ref 31.4–37.4)
MCHC RBC AUTO-ENTMCNC: 33.5 G/DL (ref 31.4–37.4)
MCV RBC AUTO: 119 FL (ref 82–98)
MCV RBC AUTO: 120 FL (ref 82–98)
MONOCYTES # BLD AUTO: 1.05 THOUSAND/ΜL (ref 0.17–1.22)
MONOCYTES # BLD AUTO: 1.2 THOUSAND/ΜL (ref 0.17–1.22)
MONOCYTES NFR BLD AUTO: 7 % (ref 4–12)
MONOCYTES NFR BLD AUTO: 8 % (ref 4–12)
NEUTROPHILS # BLD AUTO: 11.24 THOUSANDS/ΜL (ref 1.85–7.62)
NEUTROPHILS # BLD AUTO: 15.21 THOUSANDS/ΜL (ref 1.85–7.62)
NEUTS SEG NFR BLD AUTO: 80 % (ref 43–75)
NEUTS SEG NFR BLD AUTO: 89 % (ref 43–75)
NRBC BLD AUTO-RTO: 0 /100 WBCS
NRBC BLD AUTO-RTO: 1 /100 WBCS
PCO2 BLD: 26 MMOL/L (ref 21–32)
PCO2 BLD: 52.8 MM HG (ref 36–44)
PH BLD: 7.27 [PH] (ref 7.35–7.45)
PHOSPHATE SERPL-MCNC: 2.9 MG/DL (ref 2.3–4.1)
PLATELET # BLD AUTO: 133 THOUSANDS/UL (ref 149–390)
PLATELET # BLD AUTO: 181 THOUSANDS/UL (ref 149–390)
PMV BLD AUTO: 10.5 FL (ref 8.9–12.7)
PMV BLD AUTO: 9.7 FL (ref 8.9–12.7)
PO2 BLD: 128 MM HG (ref 75–129)
POTASSIUM BLD-SCNC: 4.1 MMOL/L (ref 3.5–5.3)
POTASSIUM SERPL-SCNC: 3.3 MMOL/L (ref 3.5–5.3)
PROT SERPL-MCNC: 5.8 G/DL (ref 6.4–8.2)
RBC # BLD AUTO: 2.12 MILLION/UL (ref 3.88–5.62)
RBC # BLD AUTO: 2.4 MILLION/UL (ref 3.88–5.62)
SAO2 % BLD FROM PO2: 98 % (ref 95–98)
SODIUM BLD-SCNC: 137 MMOL/L (ref 136–145)
SODIUM SERPL-SCNC: 140 MMOL/L (ref 136–145)
SPECIMEN SOURCE: ABNORMAL
WBC # BLD AUTO: 14.08 THOUSAND/UL (ref 4.31–10.16)
WBC # BLD AUTO: 17.16 THOUSAND/UL (ref 4.31–10.16)

## 2017-05-18 PROCEDURE — 83735 ASSAY OF MAGNESIUM: CPT | Performed by: SURGERY

## 2017-05-18 PROCEDURE — 82803 BLOOD GASES ANY COMBINATION: CPT

## 2017-05-18 PROCEDURE — 94760 N-INVAS EAR/PLS OXIMETRY 1: CPT

## 2017-05-18 PROCEDURE — 84295 ASSAY OF SERUM SODIUM: CPT

## 2017-05-18 PROCEDURE — 85025 COMPLETE CBC W/AUTO DIFF WBC: CPT | Performed by: SURGERY

## 2017-05-18 PROCEDURE — 07TP4ZZ RESECTION OF SPLEEN, PERCUTANEOUS ENDOSCOPIC APPROACH: ICD-10-PCS | Performed by: SURGERY

## 2017-05-18 PROCEDURE — 84132 ASSAY OF SERUM POTASSIUM: CPT

## 2017-05-18 PROCEDURE — 86860 RBC ANTIBODY ELUTION: CPT

## 2017-05-18 PROCEDURE — 86971 RBC PRETX INCUBATJ W/ENZYMES: CPT

## 2017-05-18 PROCEDURE — 82947 ASSAY GLUCOSE BLOOD QUANT: CPT

## 2017-05-18 PROCEDURE — 82330 ASSAY OF CALCIUM: CPT

## 2017-05-18 PROCEDURE — 88305 TISSUE EXAM BY PATHOLOGIST: CPT | Performed by: SURGERY

## 2017-05-18 PROCEDURE — 86870 RBC ANTIBODY IDENTIFICATION: CPT

## 2017-05-18 PROCEDURE — 84100 ASSAY OF PHOSPHORUS: CPT | Performed by: SURGERY

## 2017-05-18 PROCEDURE — 82948 REAGENT STRIP/BLOOD GLUCOSE: CPT

## 2017-05-18 PROCEDURE — 71010 HB CHEST X-RAY 1 VIEW FRONTAL (PORTABLE): CPT

## 2017-05-18 PROCEDURE — 80053 COMPREHEN METABOLIC PANEL: CPT | Performed by: SURGERY

## 2017-05-18 PROCEDURE — 85014 HEMATOCRIT: CPT

## 2017-05-18 RX ORDER — SODIUM CHLORIDE, SODIUM LACTATE, POTASSIUM CHLORIDE, CALCIUM CHLORIDE 600; 310; 30; 20 MG/100ML; MG/100ML; MG/100ML; MG/100ML
50 INJECTION, SOLUTION INTRAVENOUS CONTINUOUS
Status: DISCONTINUED | OUTPATIENT
Start: 2017-05-18 | End: 2017-05-20

## 2017-05-18 RX ORDER — BUPIVACAINE HYDROCHLORIDE 2.5 MG/ML
INJECTION, SOLUTION INFILTRATION; PERINEURAL AS NEEDED
Status: DISCONTINUED | OUTPATIENT
Start: 2017-05-18 | End: 2017-05-18 | Stop reason: HOSPADM

## 2017-05-18 RX ORDER — OXYCODONE HYDROCHLORIDE 10 MG/1
10 TABLET ORAL EVERY 4 HOURS PRN
Status: DISCONTINUED | OUTPATIENT
Start: 2017-05-18 | End: 2017-05-22 | Stop reason: HOSPADM

## 2017-05-18 RX ORDER — MAGNESIUM HYDROXIDE 1200 MG/15ML
LIQUID ORAL AS NEEDED
Status: DISCONTINUED | OUTPATIENT
Start: 2017-05-18 | End: 2017-05-18 | Stop reason: HOSPADM

## 2017-05-18 RX ORDER — ONDANSETRON 2 MG/ML
INJECTION INTRAMUSCULAR; INTRAVENOUS AS NEEDED
Status: DISCONTINUED | OUTPATIENT
Start: 2017-05-18 | End: 2017-05-18 | Stop reason: SURG

## 2017-05-18 RX ORDER — ROCURONIUM BROMIDE 10 MG/ML
INJECTION, SOLUTION INTRAVENOUS AS NEEDED
Status: DISCONTINUED | OUTPATIENT
Start: 2017-05-18 | End: 2017-05-18 | Stop reason: SURG

## 2017-05-18 RX ORDER — EPHEDRINE SULFATE 50 MG/ML
INJECTION, SOLUTION INTRAVENOUS AS NEEDED
Status: DISCONTINUED | OUTPATIENT
Start: 2017-05-18 | End: 2017-05-18 | Stop reason: SURG

## 2017-05-18 RX ORDER — OXYCODONE HYDROCHLORIDE 5 MG/1
5 TABLET ORAL EVERY 4 HOURS PRN
Status: DISCONTINUED | OUTPATIENT
Start: 2017-05-18 | End: 2017-05-22 | Stop reason: HOSPADM

## 2017-05-18 RX ORDER — PANTOPRAZOLE SODIUM 40 MG/1
40 INJECTION, POWDER, FOR SOLUTION INTRAVENOUS
Status: DISCONTINUED | OUTPATIENT
Start: 2017-05-18 | End: 2017-05-22 | Stop reason: HOSPADM

## 2017-05-18 RX ORDER — ONDANSETRON 2 MG/ML
4 INJECTION INTRAMUSCULAR; INTRAVENOUS ONCE AS NEEDED
Status: DISCONTINUED | OUTPATIENT
Start: 2017-05-18 | End: 2017-05-18 | Stop reason: HOSPADM

## 2017-05-18 RX ORDER — PROPOFOL 10 MG/ML
INJECTION, EMULSION INTRAVENOUS AS NEEDED
Status: DISCONTINUED | OUTPATIENT
Start: 2017-05-18 | End: 2017-05-18 | Stop reason: SURG

## 2017-05-18 RX ORDER — MIDAZOLAM HYDROCHLORIDE 1 MG/ML
INJECTION INTRAMUSCULAR; INTRAVENOUS AS NEEDED
Status: DISCONTINUED | OUTPATIENT
Start: 2017-05-18 | End: 2017-05-18 | Stop reason: SURG

## 2017-05-18 RX ORDER — FENTANYL CITRATE/PF 50 MCG/ML
25 SYRINGE (ML) INJECTION
Status: DISCONTINUED | OUTPATIENT
Start: 2017-05-18 | End: 2017-05-18 | Stop reason: HOSPADM

## 2017-05-18 RX ORDER — FENTANYL CITRATE 50 UG/ML
INJECTION, SOLUTION INTRAMUSCULAR; INTRAVENOUS AS NEEDED
Status: DISCONTINUED | OUTPATIENT
Start: 2017-05-18 | End: 2017-05-18 | Stop reason: SURG

## 2017-05-18 RX ORDER — LIDOCAINE HYDROCHLORIDE 10 MG/ML
INJECTION, SOLUTION INFILTRATION; PERINEURAL AS NEEDED
Status: DISCONTINUED | OUTPATIENT
Start: 2017-05-18 | End: 2017-05-18 | Stop reason: SURG

## 2017-05-18 RX ORDER — DIPHENHYDRAMINE HYDROCHLORIDE 50 MG/ML
INJECTION INTRAMUSCULAR; INTRAVENOUS AS NEEDED
Status: DISCONTINUED | OUTPATIENT
Start: 2017-05-18 | End: 2017-05-18 | Stop reason: SURG

## 2017-05-18 RX ORDER — MIDAZOLAM HYDROCHLORIDE 1 MG/ML
INJECTION INTRAMUSCULAR; INTRAVENOUS
Status: DISPENSED
Start: 2017-05-18 | End: 2017-05-18

## 2017-05-18 RX ORDER — SODIUM CHLORIDE 9 MG/ML
INJECTION, SOLUTION INTRAVENOUS CONTINUOUS PRN
Status: DISCONTINUED | OUTPATIENT
Start: 2017-05-18 | End: 2017-05-18 | Stop reason: SURG

## 2017-05-18 RX ADMIN — ROCURONIUM BROMIDE 50 MG: 10 INJECTION, SOLUTION INTRAVENOUS at 09:10

## 2017-05-18 RX ADMIN — INSULIN HUMAN 5 UNITS: 100 INJECTION, SOLUTION PARENTERAL at 11:06

## 2017-05-18 RX ADMIN — ONDANSETRON 4 MG: 2 INJECTION INTRAMUSCULAR; INTRAVENOUS at 09:44

## 2017-05-18 RX ADMIN — FENTANYL CITRATE 100 MCG: 50 INJECTION, SOLUTION INTRAMUSCULAR; INTRAVENOUS at 09:21

## 2017-05-18 RX ADMIN — SODIUM CHLORIDE, SODIUM LACTATE, POTASSIUM CHLORIDE, AND CALCIUM CHLORIDE 100 ML/HR: .6; .31; .03; .02 INJECTION, SOLUTION INTRAVENOUS at 14:35

## 2017-05-18 RX ADMIN — SODIUM CHLORIDE, SODIUM LACTATE, POTASSIUM CHLORIDE, AND CALCIUM CHLORIDE: .6; .31; .03; .02 INJECTION, SOLUTION INTRAVENOUS at 09:02

## 2017-05-18 RX ADMIN — ROCURONIUM BROMIDE 20 MG: 10 INJECTION, SOLUTION INTRAVENOUS at 09:44

## 2017-05-18 RX ADMIN — CEFAZOLIN SODIUM 2000 MG: 2 SOLUTION INTRAVENOUS at 09:23

## 2017-05-18 RX ADMIN — FENTANYL CITRATE 50 MCG: 50 INJECTION, SOLUTION INTRAMUSCULAR; INTRAVENOUS at 12:30

## 2017-05-18 RX ADMIN — DIPHENHYDRAMINE HYDROCHLORIDE 25 MG: 50 INJECTION, SOLUTION INTRAMUSCULAR; INTRAVENOUS at 09:18

## 2017-05-18 RX ADMIN — ROCURONIUM BROMIDE 20 MG: 10 INJECTION, SOLUTION INTRAVENOUS at 10:22

## 2017-05-18 RX ADMIN — METOPROLOL TARTRATE 5 MG: 5 INJECTION INTRAVENOUS at 18:41

## 2017-05-18 RX ADMIN — ONDANSETRON 4 MG: 2 INJECTION INTRAMUSCULAR; INTRAVENOUS at 11:43

## 2017-05-18 RX ADMIN — FENTANYL CITRATE 50 MCG: 50 INJECTION, SOLUTION INTRAMUSCULAR; INTRAVENOUS at 09:50

## 2017-05-18 RX ADMIN — HYDROCORTISONE SODIUM SUCCINATE 100 MG: 100 INJECTION, POWDER, FOR SOLUTION INTRAMUSCULAR; INTRAVENOUS at 05:28

## 2017-05-18 RX ADMIN — PROPOFOL 200 MG: 10 INJECTION, EMULSION INTRAVENOUS at 09:10

## 2017-05-18 RX ADMIN — HYDROCORTISONE SODIUM SUCCINATE 100 MG: 100 INJECTION, POWDER, FOR SOLUTION INTRAMUSCULAR; INTRAVENOUS at 22:43

## 2017-05-18 RX ADMIN — HEPARIN SODIUM 5000 UNITS: 5000 INJECTION, SOLUTION INTRAVENOUS; SUBCUTANEOUS at 22:43

## 2017-05-18 RX ADMIN — SODIUM CHLORIDE: 0.9 INJECTION, SOLUTION INTRAVENOUS at 09:16

## 2017-05-18 RX ADMIN — LIDOCAINE HYDROCHLORIDE 40 MG: 10 INJECTION, SOLUTION INFILTRATION; PERINEURAL at 09:10

## 2017-05-18 RX ADMIN — FENTANYL CITRATE 50 MCG: 50 INJECTION, SOLUTION INTRAMUSCULAR; INTRAVENOUS at 12:37

## 2017-05-18 RX ADMIN — DEXAMETHASONE SODIUM PHOSPHATE 4 MG: 10 INJECTION INTRAMUSCULAR; INTRAVENOUS at 09:44

## 2017-05-18 RX ADMIN — MIDAZOLAM HYDROCHLORIDE 2 MG: 1 INJECTION, SOLUTION INTRAMUSCULAR; INTRAVENOUS at 09:02

## 2017-05-18 RX ADMIN — FENTANYL CITRATE 100 MCG: 50 INJECTION, SOLUTION INTRAMUSCULAR; INTRAVENOUS at 09:10

## 2017-05-18 RX ADMIN — ROCURONIUM BROMIDE 10 MG: 10 INJECTION, SOLUTION INTRAVENOUS at 11:03

## 2017-05-18 RX ADMIN — EPHEDRINE SULFATE 10 MG: 50 INJECTION, SOLUTION INTRAMUSCULAR; INTRAVENOUS; SUBCUTANEOUS at 10:30

## 2017-05-18 RX ADMIN — ROCURONIUM BROMIDE 10 MG: 10 INJECTION, SOLUTION INTRAVENOUS at 11:30

## 2017-05-18 RX ADMIN — Medication: at 13:30

## 2017-05-19 ENCOUNTER — APPOINTMENT (INPATIENT)
Dept: RADIOLOGY | Facility: HOSPITAL | Age: 62
DRG: 800 | End: 2017-05-19
Payer: COMMERCIAL

## 2017-05-19 LAB
ANION GAP SERPL CALCULATED.3IONS-SCNC: 6 MMOL/L (ref 4–13)
BUN SERPL-MCNC: 13 MG/DL (ref 5–25)
CALCIUM SERPL-MCNC: 8.7 MG/DL (ref 8.3–10.1)
CHLORIDE SERPL-SCNC: 102 MMOL/L (ref 100–108)
CO2 SERPL-SCNC: 30 MMOL/L (ref 21–32)
CREAT SERPL-MCNC: 0.81 MG/DL (ref 0.6–1.3)
DAT C3-SP REAG RBC QL: NEGATIVE
DAT IGG-SP REAG RBCCO QL: NORMAL
DAT POLY-SP REAG RBC QL: NORMAL
ERYTHROCYTE [DISTWIDTH] IN BLOOD BY AUTOMATED COUNT: 14.6 % (ref 11.6–15.1)
GFR SERPL CREATININE-BSD FRML MDRD: >60 ML/MIN/1.73SQ M
GLUCOSE SERPL-MCNC: 129 MG/DL (ref 65–140)
HCT VFR BLD AUTO: 25.8 % (ref 36.5–49.3)
HGB BLD-MCNC: 8.3 G/DL (ref 12–17)
LDH SERPL-CCNC: 479 U/L (ref 81–234)
MCH RBC QN AUTO: 39 PG (ref 26.8–34.3)
MCHC RBC AUTO-ENTMCNC: 32.2 G/DL (ref 31.4–37.4)
MCV RBC AUTO: 121 FL (ref 82–98)
PLATELET # BLD AUTO: 144 THOUSANDS/UL (ref 149–390)
PMV BLD AUTO: 10.2 FL (ref 8.9–12.7)
POTASSIUM SERPL-SCNC: 3.9 MMOL/L (ref 3.5–5.3)
RBC # BLD AUTO: 2.13 MILLION/UL (ref 3.88–5.62)
SODIUM SERPL-SCNC: 138 MMOL/L (ref 136–145)
WBC # BLD AUTO: 14.26 THOUSAND/UL (ref 4.31–10.16)

## 2017-05-19 PROCEDURE — 86880 COOMBS TEST DIRECT: CPT | Performed by: INTERNAL MEDICINE

## 2017-05-19 PROCEDURE — 94760 N-INVAS EAR/PLS OXIMETRY 1: CPT

## 2017-05-19 PROCEDURE — 94762 N-INVAS EAR/PLS OXIMTRY CONT: CPT

## 2017-05-19 PROCEDURE — 85027 COMPLETE CBC AUTOMATED: CPT | Performed by: INTERNAL MEDICINE

## 2017-05-19 PROCEDURE — C9113 INJ PANTOPRAZOLE SODIUM, VIA: HCPCS | Performed by: SURGERY

## 2017-05-19 PROCEDURE — 71275 CT ANGIOGRAPHY CHEST: CPT

## 2017-05-19 PROCEDURE — 83615 LACTATE (LD) (LDH) ENZYME: CPT | Performed by: INTERNAL MEDICINE

## 2017-05-19 PROCEDURE — 80048 BASIC METABOLIC PNL TOTAL CA: CPT | Performed by: SURGERY

## 2017-05-19 RX ORDER — ONDANSETRON 2 MG/ML
4 INJECTION INTRAMUSCULAR; INTRAVENOUS EVERY 4 HOURS PRN
Status: DISCONTINUED | OUTPATIENT
Start: 2017-05-19 | End: 2017-05-22 | Stop reason: HOSPADM

## 2017-05-19 RX ORDER — DIPHENHYDRAMINE HCL 25 MG
50 TABLET ORAL ONCE
Status: COMPLETED | OUTPATIENT
Start: 2017-05-19 | End: 2017-05-19

## 2017-05-19 RX ADMIN — IODIXANOL 85 ML: 320 INJECTION, SOLUTION INTRAVASCULAR at 16:00

## 2017-05-19 RX ADMIN — METOPROLOL TARTRATE 5 MG: 5 INJECTION INTRAVENOUS at 17:47

## 2017-05-19 RX ADMIN — SODIUM CHLORIDE, SODIUM LACTATE, POTASSIUM CHLORIDE, AND CALCIUM CHLORIDE 100 ML/HR: .6; .31; .03; .02 INJECTION, SOLUTION INTRAVENOUS at 08:54

## 2017-05-19 RX ADMIN — HYDROCORTISONE SODIUM SUCCINATE 200 MG: 100 INJECTION, POWDER, FOR SOLUTION INTRAMUSCULAR; INTRAVENOUS at 10:31

## 2017-05-19 RX ADMIN — PANTOPRAZOLE SODIUM 40 MG: 40 INJECTION, POWDER, FOR SOLUTION INTRAVENOUS at 08:32

## 2017-05-19 RX ADMIN — HYDROCORTISONE SODIUM SUCCINATE 100 MG: 100 INJECTION, POWDER, FOR SOLUTION INTRAMUSCULAR; INTRAVENOUS at 05:41

## 2017-05-19 RX ADMIN — IODIXANOL 100 ML: 320 INJECTION, SOLUTION INTRAVASCULAR at 15:24

## 2017-05-19 RX ADMIN — HEPARIN SODIUM 5000 UNITS: 5000 INJECTION, SOLUTION INTRAVENOUS; SUBCUTANEOUS at 22:34

## 2017-05-19 RX ADMIN — SODIUM CHLORIDE, SODIUM LACTATE, POTASSIUM CHLORIDE, AND CALCIUM CHLORIDE 100 ML/HR: .6; .31; .03; .02 INJECTION, SOLUTION INTRAVENOUS at 01:15

## 2017-05-19 RX ADMIN — HEPARIN SODIUM 5000 UNITS: 5000 INJECTION, SOLUTION INTRAVENOUS; SUBCUTANEOUS at 13:37

## 2017-05-19 RX ADMIN — DIPHENHYDRAMINE HCL 50 MG: 25 TABLET ORAL at 13:37

## 2017-05-19 RX ADMIN — Medication: at 17:02

## 2017-05-19 RX ADMIN — METOPROLOL TARTRATE 5 MG: 5 INJECTION INTRAVENOUS at 03:45

## 2017-05-19 RX ADMIN — HYDROCORTISONE SODIUM SUCCINATE 100 MG: 100 INJECTION, POWDER, FOR SOLUTION INTRAMUSCULAR; INTRAVENOUS at 13:37

## 2017-05-19 RX ADMIN — HYDROCORTISONE SODIUM SUCCINATE 100 MG: 100 INJECTION, POWDER, FOR SOLUTION INTRAMUSCULAR; INTRAVENOUS at 22:34

## 2017-05-19 RX ADMIN — SODIUM CHLORIDE, SODIUM LACTATE, POTASSIUM CHLORIDE, AND CALCIUM CHLORIDE 100 ML/HR: .6; .31; .03; .02 INJECTION, SOLUTION INTRAVENOUS at 20:09

## 2017-05-19 RX ADMIN — HEPARIN SODIUM 5000 UNITS: 5000 INJECTION, SOLUTION INTRAVENOUS; SUBCUTANEOUS at 05:41

## 2017-05-20 LAB
ANION GAP SERPL CALCULATED.3IONS-SCNC: 6 MMOL/L (ref 4–13)
BUN SERPL-MCNC: 16 MG/DL (ref 5–25)
CALCIUM SERPL-MCNC: 8.8 MG/DL (ref 8.3–10.1)
CHLORIDE SERPL-SCNC: 102 MMOL/L (ref 100–108)
CO2 SERPL-SCNC: 30 MMOL/L (ref 21–32)
CREAT SERPL-MCNC: 0.72 MG/DL (ref 0.6–1.3)
DAT C3-SP REAG RBC QL: NORMAL
DAT IGG-SP REAG RBCCO QL: NORMAL
DAT POLY-SP REAG RBC QL: NORMAL
ERYTHROCYTE [DISTWIDTH] IN BLOOD BY AUTOMATED COUNT: 14.6 % (ref 11.6–15.1)
GFR SERPL CREATININE-BSD FRML MDRD: >60 ML/MIN/1.73SQ M
GLUCOSE SERPL-MCNC: 115 MG/DL (ref 65–140)
HCT VFR BLD AUTO: 22.8 % (ref 36.5–49.3)
HGB BLD-MCNC: 7.6 G/DL (ref 12–17)
LDH SERPL-CCNC: 449 U/L (ref 81–234)
MCH RBC QN AUTO: 39.8 PG (ref 26.8–34.3)
MCHC RBC AUTO-ENTMCNC: 33.3 G/DL (ref 31.4–37.4)
MCV RBC AUTO: 119 FL (ref 82–98)
PLATELET # BLD AUTO: 168 THOUSANDS/UL (ref 149–390)
PMV BLD AUTO: 10.1 FL (ref 8.9–12.7)
POTASSIUM SERPL-SCNC: 3.6 MMOL/L (ref 3.5–5.3)
RBC # BLD AUTO: 1.91 MILLION/UL (ref 3.88–5.62)
RETICS # AUTO: ABNORMAL 10*3/UL (ref 14356–105094)
RETICS # CALC: 16.99 % (ref 0.37–1.87)
SODIUM SERPL-SCNC: 138 MMOL/L (ref 136–145)
WBC # BLD AUTO: 16.4 THOUSAND/UL (ref 4.31–10.16)

## 2017-05-20 PROCEDURE — C9113 INJ PANTOPRAZOLE SODIUM, VIA: HCPCS | Performed by: SURGERY

## 2017-05-20 PROCEDURE — 97162 PT EVAL MOD COMPLEX 30 MIN: CPT | Performed by: PHYSICAL THERAPIST

## 2017-05-20 PROCEDURE — 86880 COOMBS TEST DIRECT: CPT | Performed by: INTERNAL MEDICINE

## 2017-05-20 PROCEDURE — 85045 AUTOMATED RETICULOCYTE COUNT: CPT | Performed by: INTERNAL MEDICINE

## 2017-05-20 PROCEDURE — G8980 MOBILITY D/C STATUS: HCPCS | Performed by: PHYSICAL THERAPIST

## 2017-05-20 PROCEDURE — G8979 MOBILITY GOAL STATUS: HCPCS | Performed by: PHYSICAL THERAPIST

## 2017-05-20 PROCEDURE — G8978 MOBILITY CURRENT STATUS: HCPCS | Performed by: PHYSICAL THERAPIST

## 2017-05-20 PROCEDURE — 85027 COMPLETE CBC AUTOMATED: CPT | Performed by: SURGERY

## 2017-05-20 PROCEDURE — 83615 LACTATE (LD) (LDH) ENZYME: CPT | Performed by: INTERNAL MEDICINE

## 2017-05-20 PROCEDURE — 80048 BASIC METABOLIC PNL TOTAL CA: CPT | Performed by: SURGERY

## 2017-05-20 RX ORDER — LABETALOL HYDROCHLORIDE 5 MG/ML
10 INJECTION, SOLUTION INTRAVENOUS EVERY 6 HOURS PRN
Status: DISCONTINUED | OUTPATIENT
Start: 2017-05-20 | End: 2017-05-22 | Stop reason: HOSPADM

## 2017-05-20 RX ORDER — DEXTROSE, SODIUM CHLORIDE, AND POTASSIUM CHLORIDE 5; .45; .15 G/100ML; G/100ML; G/100ML
100 INJECTION INTRAVENOUS CONTINUOUS
Status: DISCONTINUED | OUTPATIENT
Start: 2017-05-20 | End: 2017-05-20

## 2017-05-20 RX ADMIN — PANTOPRAZOLE SODIUM 40 MG: 40 INJECTION, POWDER, FOR SOLUTION INTRAVENOUS at 10:11

## 2017-05-20 RX ADMIN — DEXTROSE, SODIUM CHLORIDE, AND POTASSIUM CHLORIDE 100 ML/HR: 5; .45; .15 INJECTION INTRAVENOUS at 09:55

## 2017-05-20 RX ADMIN — HEPARIN SODIUM 5000 UNITS: 5000 INJECTION, SOLUTION INTRAVENOUS; SUBCUTANEOUS at 06:28

## 2017-05-20 RX ADMIN — HYDROCORTISONE SODIUM SUCCINATE 100 MG: 100 INJECTION, POWDER, FOR SOLUTION INTRAMUSCULAR; INTRAVENOUS at 21:08

## 2017-05-20 RX ADMIN — HYDROCORTISONE SODIUM SUCCINATE 100 MG: 100 INJECTION, POWDER, FOR SOLUTION INTRAMUSCULAR; INTRAVENOUS at 06:28

## 2017-05-20 RX ADMIN — HEPARIN SODIUM 5000 UNITS: 5000 INJECTION, SOLUTION INTRAVENOUS; SUBCUTANEOUS at 21:08

## 2017-05-20 RX ADMIN — HEPARIN SODIUM 5000 UNITS: 5000 INJECTION, SOLUTION INTRAVENOUS; SUBCUTANEOUS at 15:40

## 2017-05-20 RX ADMIN — LABETALOL HYDROCHLORIDE 10 MG: 5 INJECTION, SOLUTION INTRAVENOUS at 10:14

## 2017-05-20 RX ADMIN — HYDROCORTISONE SODIUM SUCCINATE 100 MG: 100 INJECTION, POWDER, FOR SOLUTION INTRAMUSCULAR; INTRAVENOUS at 15:40

## 2017-05-21 LAB
ABO GROUP BLD BPU: NORMAL
AMYLASE FLD QL: 11 U/L
ANION GAP SERPL CALCULATED.3IONS-SCNC: 7 MMOL/L (ref 4–13)
BASOPHILS # BLD AUTO: 0.01 THOUSANDS/ΜL (ref 0–0.1)
BASOPHILS NFR BLD AUTO: 0 % (ref 0–1)
BPU ID: NORMAL
BUN SERPL-MCNC: 16 MG/DL (ref 5–25)
CALCIUM SERPL-MCNC: 9.1 MG/DL (ref 8.3–10.1)
CHLORIDE SERPL-SCNC: 101 MMOL/L (ref 100–108)
CO2 SERPL-SCNC: 30 MMOL/L (ref 21–32)
CREAT SERPL-MCNC: 0.75 MG/DL (ref 0.6–1.3)
CROSSMATCH: NORMAL
DAT C3-SP REAG RBC QL: NORMAL
DAT IGG-SP REAG RBCCO QL: NORMAL
DAT POLY-SP REAG RBC QL: NORMAL
EOSINOPHIL # BLD AUTO: 0 THOUSAND/ΜL (ref 0–0.61)
EOSINOPHIL NFR BLD AUTO: 0 % (ref 0–6)
ERYTHROCYTE [DISTWIDTH] IN BLOOD BY AUTOMATED COUNT: 14 % (ref 11.6–15.1)
GFR SERPL CREATININE-BSD FRML MDRD: >60 ML/MIN/1.73SQ M
GLUCOSE SERPL-MCNC: 116 MG/DL (ref 65–140)
HCT VFR BLD AUTO: 23.3 % (ref 36.5–49.3)
HGB BLD-MCNC: 7.6 G/DL (ref 12–17)
LYMPHOCYTES # BLD AUTO: 1.66 THOUSANDS/ΜL (ref 0.6–4.47)
LYMPHOCYTES NFR BLD AUTO: 14 % (ref 14–44)
MAGNESIUM SERPL-MCNC: 2.4 MG/DL (ref 1.6–2.6)
MCH RBC QN AUTO: 38.4 PG (ref 26.8–34.3)
MCHC RBC AUTO-ENTMCNC: 32.6 G/DL (ref 31.4–37.4)
MCV RBC AUTO: 118 FL (ref 82–98)
MONOCYTES # BLD AUTO: 1.49 THOUSAND/ΜL (ref 0.17–1.22)
MONOCYTES NFR BLD AUTO: 12 % (ref 4–12)
NEUTROPHILS # BLD AUTO: 9.02 THOUSANDS/ΜL (ref 1.85–7.62)
NEUTS SEG NFR BLD AUTO: 74 % (ref 43–75)
NRBC BLD AUTO-RTO: 2 /100 WBCS
PLATELET # BLD AUTO: 243 THOUSANDS/UL (ref 149–390)
PMV BLD AUTO: 10.4 FL (ref 8.9–12.7)
POTASSIUM SERPL-SCNC: 3.3 MMOL/L (ref 3.5–5.3)
RBC # BLD AUTO: 1.98 MILLION/UL (ref 3.88–5.62)
SODIUM SERPL-SCNC: 138 MMOL/L (ref 136–145)
UNIT DISPENSE STATUS: NORMAL
UNIT PRODUCT CODE: NORMAL
UNIT RH: NORMAL
WBC # BLD AUTO: 12.22 THOUSAND/UL (ref 4.31–10.16)

## 2017-05-21 PROCEDURE — C9113 INJ PANTOPRAZOLE SODIUM, VIA: HCPCS | Performed by: SURGERY

## 2017-05-21 PROCEDURE — A9270 NON-COVERED ITEM OR SERVICE: HCPCS | Performed by: SURGERY

## 2017-05-21 PROCEDURE — 85025 COMPLETE CBC W/AUTO DIFF WBC: CPT | Performed by: SURGERY

## 2017-05-21 PROCEDURE — 83735 ASSAY OF MAGNESIUM: CPT | Performed by: SURGERY

## 2017-05-21 PROCEDURE — 82150 ASSAY OF AMYLASE: CPT | Performed by: SURGERY

## 2017-05-21 PROCEDURE — 86880 COOMBS TEST DIRECT: CPT | Performed by: INTERNAL MEDICINE

## 2017-05-21 PROCEDURE — 80048 BASIC METABOLIC PNL TOTAL CA: CPT | Performed by: SURGERY

## 2017-05-21 RX ORDER — DOCUSATE SODIUM 100 MG/1
100 CAPSULE, LIQUID FILLED ORAL 2 TIMES DAILY
Status: DISCONTINUED | OUTPATIENT
Start: 2017-05-21 | End: 2017-05-22 | Stop reason: HOSPADM

## 2017-05-21 RX ORDER — POTASSIUM CHLORIDE 20 MEQ/1
40 TABLET, EXTENDED RELEASE ORAL 2 TIMES DAILY
Status: COMPLETED | OUTPATIENT
Start: 2017-05-21 | End: 2017-05-21

## 2017-05-21 RX ORDER — SENNOSIDES 8.6 MG
1 TABLET ORAL
Status: DISCONTINUED | OUTPATIENT
Start: 2017-05-21 | End: 2017-05-22 | Stop reason: HOSPADM

## 2017-05-21 RX ADMIN — HYDROCORTISONE SODIUM SUCCINATE 50 MG: 100 INJECTION, POWDER, FOR SOLUTION INTRAMUSCULAR; INTRAVENOUS at 21:07

## 2017-05-21 RX ADMIN — HEPARIN SODIUM 5000 UNITS: 5000 INJECTION, SOLUTION INTRAVENOUS; SUBCUTANEOUS at 07:02

## 2017-05-21 RX ADMIN — PANTOPRAZOLE SODIUM 40 MG: 40 INJECTION, POWDER, FOR SOLUTION INTRAVENOUS at 10:04

## 2017-05-21 RX ADMIN — HEPARIN SODIUM 5000 UNITS: 5000 INJECTION, SOLUTION INTRAVENOUS; SUBCUTANEOUS at 21:07

## 2017-05-21 RX ADMIN — POTASSIUM CHLORIDE 40 MEQ: 1500 TABLET, EXTENDED RELEASE ORAL at 10:04

## 2017-05-21 RX ADMIN — POTASSIUM CHLORIDE 40 MEQ: 1500 TABLET, EXTENDED RELEASE ORAL at 17:06

## 2017-05-21 RX ADMIN — HEPARIN SODIUM 5000 UNITS: 5000 INJECTION, SOLUTION INTRAVENOUS; SUBCUTANEOUS at 15:28

## 2017-05-22 ENCOUNTER — GENERIC CONVERSION - ENCOUNTER (OUTPATIENT)
Dept: OTHER | Facility: OTHER | Age: 62
End: 2017-05-22

## 2017-05-22 VITALS
RESPIRATION RATE: 18 BRPM | WEIGHT: 192.46 LBS | SYSTOLIC BLOOD PRESSURE: 137 MMHG | BODY MASS INDEX: 28.51 KG/M2 | HEART RATE: 68 BPM | DIASTOLIC BLOOD PRESSURE: 90 MMHG | TEMPERATURE: 99.1 F | OXYGEN SATURATION: 97 % | HEIGHT: 69 IN

## 2017-05-22 LAB
ANION GAP SERPL CALCULATED.3IONS-SCNC: 7 MMOL/L (ref 4–13)
BASOPHILS # BLD AUTO: 0.01 THOUSANDS/ΜL (ref 0–0.1)
BASOPHILS NFR BLD AUTO: 0 % (ref 0–1)
BUN SERPL-MCNC: 14 MG/DL (ref 5–25)
CALCIUM SERPL-MCNC: 8.6 MG/DL (ref 8.3–10.1)
CHLORIDE SERPL-SCNC: 104 MMOL/L (ref 100–108)
CO2 SERPL-SCNC: 28 MMOL/L (ref 21–32)
CREAT SERPL-MCNC: 0.77 MG/DL (ref 0.6–1.3)
EOSINOPHIL # BLD AUTO: 0.04 THOUSAND/ΜL (ref 0–0.61)
EOSINOPHIL NFR BLD AUTO: 0 % (ref 0–6)
ERYTHROCYTE [DISTWIDTH] IN BLOOD BY AUTOMATED COUNT: 14.3 % (ref 11.6–15.1)
GFR SERPL CREATININE-BSD FRML MDRD: >60 ML/MIN/1.73SQ M
GLUCOSE SERPL-MCNC: 99 MG/DL (ref 65–140)
HCT VFR BLD AUTO: 23.7 % (ref 36.5–49.3)
HGB BLD-MCNC: 7.9 G/DL (ref 12–17)
LYMPHOCYTES # BLD AUTO: 1.97 THOUSANDS/ΜL (ref 0.6–4.47)
LYMPHOCYTES NFR BLD AUTO: 21 % (ref 14–44)
MAGNESIUM SERPL-MCNC: 2.3 MG/DL (ref 1.6–2.6)
MCH RBC QN AUTO: 39.1 PG (ref 26.8–34.3)
MCHC RBC AUTO-ENTMCNC: 33.3 G/DL (ref 31.4–37.4)
MCV RBC AUTO: 117 FL (ref 82–98)
MONOCYTES # BLD AUTO: 1.25 THOUSAND/ΜL (ref 0.17–1.22)
MONOCYTES NFR BLD AUTO: 13 % (ref 4–12)
NEUTROPHILS # BLD AUTO: 6.06 THOUSANDS/ΜL (ref 1.85–7.62)
NEUTS SEG NFR BLD AUTO: 66 % (ref 43–75)
NRBC BLD AUTO-RTO: 3 /100 WBCS
PLATELET # BLD AUTO: 295 THOUSANDS/UL (ref 149–390)
PMV BLD AUTO: 10.1 FL (ref 8.9–12.7)
POTASSIUM SERPL-SCNC: 3 MMOL/L (ref 3.5–5.3)
RBC # BLD AUTO: 2.02 MILLION/UL (ref 3.88–5.62)
SODIUM SERPL-SCNC: 139 MMOL/L (ref 136–145)
WBC # BLD AUTO: 9.37 THOUSAND/UL (ref 4.31–10.16)

## 2017-05-22 PROCEDURE — G8989 SELF CARE D/C STATUS: HCPCS

## 2017-05-22 PROCEDURE — A9270 NON-COVERED ITEM OR SERVICE: HCPCS | Performed by: SURGERY

## 2017-05-22 PROCEDURE — G8988 SELF CARE GOAL STATUS: HCPCS

## 2017-05-22 PROCEDURE — 97165 OT EVAL LOW COMPLEX 30 MIN: CPT

## 2017-05-22 PROCEDURE — G8987 SELF CARE CURRENT STATUS: HCPCS

## 2017-05-22 PROCEDURE — 85025 COMPLETE CBC W/AUTO DIFF WBC: CPT | Performed by: SURGERY

## 2017-05-22 PROCEDURE — 83735 ASSAY OF MAGNESIUM: CPT | Performed by: SURGERY

## 2017-05-22 PROCEDURE — 80048 BASIC METABOLIC PNL TOTAL CA: CPT | Performed by: SURGERY

## 2017-05-22 PROCEDURE — C9113 INJ PANTOPRAZOLE SODIUM, VIA: HCPCS | Performed by: SURGERY

## 2017-05-22 RX ORDER — PREDNISONE 10 MG/1
10 TABLET ORAL DAILY
Qty: 30 TABLET | Refills: 0 | Status: SHIPPED | OUTPATIENT
Start: 2017-05-22 | End: 2017-06-09 | Stop reason: HOSPADM

## 2017-05-22 RX ORDER — POTASSIUM CHLORIDE 20 MEQ/1
40 TABLET, EXTENDED RELEASE ORAL ONCE
Status: COMPLETED | OUTPATIENT
Start: 2017-05-22 | End: 2017-05-22

## 2017-05-22 RX ORDER — DOCUSATE SODIUM 100 MG/1
100 CAPSULE, LIQUID FILLED ORAL 2 TIMES DAILY
Qty: 30 CAPSULE | Refills: 0 | Status: SHIPPED | OUTPATIENT
Start: 2017-05-22 | End: 2017-06-06

## 2017-05-22 RX ORDER — POTASSIUM CHLORIDE 14.9 MG/ML
20 INJECTION INTRAVENOUS
Status: DISCONTINUED | OUTPATIENT
Start: 2017-05-22 | End: 2017-05-22 | Stop reason: HOSPADM

## 2017-05-22 RX ADMIN — HEPARIN SODIUM 5000 UNITS: 5000 INJECTION, SOLUTION INTRAVENOUS; SUBCUTANEOUS at 05:37

## 2017-05-22 RX ADMIN — PANTOPRAZOLE SODIUM 40 MG: 40 INJECTION, POWDER, FOR SOLUTION INTRAVENOUS at 09:55

## 2017-05-22 RX ADMIN — POTASSIUM CHLORIDE 40 MEQ: 1500 TABLET, EXTENDED RELEASE ORAL at 12:11

## 2017-05-22 RX ADMIN — HYDROCORTISONE SODIUM SUCCINATE 50 MG: 100 INJECTION, POWDER, FOR SOLUTION INTRAMUSCULAR; INTRAVENOUS at 09:56

## 2017-05-22 RX ADMIN — POTASSIUM CHLORIDE 20 MEQ: 200 INJECTION, SOLUTION INTRAVENOUS at 12:12

## 2017-05-22 RX ADMIN — HEPARIN SODIUM 5000 UNITS: 5000 INJECTION, SOLUTION INTRAVENOUS; SUBCUTANEOUS at 13:47

## 2017-05-24 ENCOUNTER — TRANSCRIBE ORDERS (OUTPATIENT)
Dept: ADMINISTRATIVE | Facility: HOSPITAL | Age: 62
End: 2017-05-24

## 2017-05-24 ENCOUNTER — LAB (OUTPATIENT)
Dept: LAB | Facility: MEDICAL CENTER | Age: 62
End: 2017-05-24
Payer: COMMERCIAL

## 2017-05-24 DIAGNOSIS — D59.19 ANTIBODY-MEDIATED ANEMIA (HCC): Primary | ICD-10-CM

## 2017-05-24 DIAGNOSIS — D59.11 HEMOLYTIC ANEMIA DUE TO WARM ANTIBODY (HCC): ICD-10-CM

## 2017-05-24 LAB
BASOPHILS # BLD AUTO: 0.02 THOUSANDS/ΜL (ref 0–0.1)
BASOPHILS NFR BLD AUTO: 0 % (ref 0–1)
EOSINOPHIL # BLD AUTO: 0.26 THOUSAND/ΜL (ref 0–0.61)
EOSINOPHIL NFR BLD AUTO: 4 % (ref 0–6)
ERYTHROCYTE [DISTWIDTH] IN BLOOD BY AUTOMATED COUNT: 14.7 % (ref 11.6–15.1)
HCT VFR BLD AUTO: 27 % (ref 36.5–49.3)
HGB BLD-MCNC: 8.7 G/DL (ref 12–17)
LYMPHOCYTES # BLD AUTO: 0.81 THOUSANDS/ΜL (ref 0.6–4.47)
LYMPHOCYTES NFR BLD AUTO: 11 % (ref 14–44)
MCH RBC QN AUTO: 38.2 PG (ref 26.8–34.3)
MCHC RBC AUTO-ENTMCNC: 32.2 G/DL (ref 31.4–37.4)
MCV RBC AUTO: 118 FL (ref 82–98)
MONOCYTES # BLD AUTO: 0.92 THOUSAND/ΜL (ref 0.17–1.22)
MONOCYTES NFR BLD AUTO: 12 % (ref 4–12)
NEUTROPHILS # BLD AUTO: 5.39 THOUSANDS/ΜL (ref 1.85–7.62)
NEUTS SEG NFR BLD AUTO: 73 % (ref 43–75)
NRBC BLD AUTO-RTO: 1 /100 WBCS
PLATELET # BLD AUTO: 433 THOUSANDS/UL (ref 149–390)
PMV BLD AUTO: 10.3 FL (ref 8.9–12.7)
RBC # BLD AUTO: 2.28 MILLION/UL (ref 3.88–5.62)
WBC # BLD AUTO: 7.42 THOUSAND/UL (ref 4.31–10.16)

## 2017-05-24 PROCEDURE — 36415 COLL VENOUS BLD VENIPUNCTURE: CPT

## 2017-05-24 PROCEDURE — 85025 COMPLETE CBC W/AUTO DIFF WBC: CPT

## 2017-05-25 ENCOUNTER — GENERIC CONVERSION - ENCOUNTER (OUTPATIENT)
Dept: OTHER | Facility: OTHER | Age: 62
End: 2017-05-25

## 2017-05-25 ENCOUNTER — ALLSCRIPTS OFFICE VISIT (OUTPATIENT)
Dept: OTHER | Facility: OTHER | Age: 62
End: 2017-05-25

## 2017-05-26 ENCOUNTER — LAB (OUTPATIENT)
Dept: LAB | Facility: MEDICAL CENTER | Age: 62
End: 2017-05-26
Payer: COMMERCIAL

## 2017-05-26 DIAGNOSIS — D59.19 ANTIBODY-MEDIATED ANEMIA (HCC): ICD-10-CM

## 2017-05-26 LAB
BASOPHILS # BLD AUTO: 0.04 THOUSANDS/ΜL (ref 0–0.1)
BASOPHILS NFR BLD AUTO: 0 % (ref 0–1)
EOSINOPHIL # BLD AUTO: 0.13 THOUSAND/ΜL (ref 0–0.61)
EOSINOPHIL NFR BLD AUTO: 1 % (ref 0–6)
ERYTHROCYTE [DISTWIDTH] IN BLOOD BY AUTOMATED COUNT: 13.9 % (ref 11.6–15.1)
HCT VFR BLD AUTO: 26.9 % (ref 36.5–49.3)
HGB BLD-MCNC: 8.3 G/DL (ref 12–17)
LYMPHOCYTES # BLD AUTO: 0.58 THOUSANDS/ΜL (ref 0.6–4.47)
LYMPHOCYTES NFR BLD AUTO: 5 % (ref 14–44)
MCH RBC QN AUTO: 35.9 PG (ref 26.8–34.3)
MCHC RBC AUTO-ENTMCNC: 30.9 G/DL (ref 31.4–37.4)
MCV RBC AUTO: 117 FL (ref 82–98)
MONOCYTES # BLD AUTO: 1.24 THOUSAND/ΜL (ref 0.17–1.22)
MONOCYTES NFR BLD AUTO: 11 % (ref 4–12)
NEUTROPHILS # BLD AUTO: 9.37 THOUSANDS/ΜL (ref 1.85–7.62)
NEUTS SEG NFR BLD AUTO: 83 % (ref 43–75)
NRBC BLD AUTO-RTO: 0 /100 WBCS
PLATELET # BLD AUTO: 556 THOUSANDS/UL (ref 149–390)
PMV BLD AUTO: 10 FL (ref 8.9–12.7)
RBC # BLD AUTO: 2.31 MILLION/UL (ref 3.88–5.62)
WBC # BLD AUTO: 11.41 THOUSAND/UL (ref 4.31–10.16)

## 2017-05-26 PROCEDURE — 85025 COMPLETE CBC W/AUTO DIFF WBC: CPT

## 2017-05-26 PROCEDURE — 36415 COLL VENOUS BLD VENIPUNCTURE: CPT

## 2017-06-01 ENCOUNTER — LAB (OUTPATIENT)
Dept: LAB | Facility: MEDICAL CENTER | Age: 62
End: 2017-06-01
Payer: COMMERCIAL

## 2017-06-01 DIAGNOSIS — D58.9 HEREDITARY HEMOLYTIC ANEMIA, UNSPECIFIED (HCC): ICD-10-CM

## 2017-06-01 LAB
BASOPHILS # BLD AUTO: 0.1 THOUSANDS/ΜL (ref 0–0.1)
BASOPHILS NFR BLD AUTO: 1 % (ref 0–1)
EOSINOPHIL # BLD AUTO: 0.33 THOUSAND/ΜL (ref 0–0.61)
EOSINOPHIL NFR BLD AUTO: 3 % (ref 0–6)
ERYTHROCYTE [DISTWIDTH] IN BLOOD BY AUTOMATED COUNT: 15.3 % (ref 11.6–15.1)
HCT VFR BLD AUTO: 22.7 % (ref 36.5–49.3)
HGB BLD-MCNC: 7 G/DL (ref 12–17)
LYMPHOCYTES # BLD AUTO: 1.97 THOUSANDS/ΜL (ref 0.6–4.47)
LYMPHOCYTES NFR BLD AUTO: 19 % (ref 14–44)
MCH RBC QN AUTO: 35.4 PG (ref 26.8–34.3)
MCHC RBC AUTO-ENTMCNC: 30.8 G/DL (ref 31.4–37.4)
MCV RBC AUTO: 115 FL (ref 82–98)
MONOCYTES # BLD AUTO: 0.84 THOUSAND/ΜL (ref 0.17–1.22)
MONOCYTES NFR BLD AUTO: 8 % (ref 4–12)
NEUTROPHILS # BLD AUTO: 7.14 THOUSANDS/ΜL (ref 1.85–7.62)
NEUTS SEG NFR BLD AUTO: 69 % (ref 43–75)
NRBC BLD AUTO-RTO: 2 /100 WBCS
PLATELET # BLD AUTO: 820 THOUSANDS/UL (ref 149–390)
PMV BLD AUTO: 9.4 FL (ref 8.9–12.7)
RBC # BLD AUTO: 1.98 MILLION/UL (ref 3.88–5.62)
WBC # BLD AUTO: 10.5 THOUSAND/UL (ref 4.31–10.16)

## 2017-06-01 PROCEDURE — 85025 COMPLETE CBC W/AUTO DIFF WBC: CPT

## 2017-06-01 PROCEDURE — 36415 COLL VENOUS BLD VENIPUNCTURE: CPT

## 2017-06-02 ENCOUNTER — GENERIC CONVERSION - ENCOUNTER (OUTPATIENT)
Dept: OTHER | Facility: OTHER | Age: 62
End: 2017-06-02

## 2017-06-03 ENCOUNTER — TRANSCRIBE ORDERS (OUTPATIENT)
Dept: ADMINISTRATIVE | Facility: HOSPITAL | Age: 62
End: 2017-06-03

## 2017-06-03 ENCOUNTER — LAB (OUTPATIENT)
Dept: LAB | Facility: MEDICAL CENTER | Age: 62
End: 2017-06-03
Payer: COMMERCIAL

## 2017-06-03 DIAGNOSIS — D58.9 HEREDITARY HEMOLYTIC ANEMIA, UNSPECIFIED (HCC): ICD-10-CM

## 2017-06-03 DIAGNOSIS — D58.9 HEREDITARY HEMOLYTIC ANEMIA (HCC): ICD-10-CM

## 2017-06-03 DIAGNOSIS — D58.9 HEREDITARY HEMOLYTIC ANEMIA, UNSPECIFIED (HCC): Primary | ICD-10-CM

## 2017-06-03 LAB
ANION GAP SERPL CALCULATED.3IONS-SCNC: 9 MMOL/L (ref 4–13)
BUN SERPL-MCNC: 12 MG/DL (ref 5–25)
CALCIUM SERPL-MCNC: 8.9 MG/DL (ref 8.3–10.1)
CHLORIDE SERPL-SCNC: 107 MMOL/L (ref 100–108)
CO2 SERPL-SCNC: 25 MMOL/L (ref 21–32)
CREAT SERPL-MCNC: 0.87 MG/DL (ref 0.6–1.3)
FOLATE SERPL-MCNC: >20 NG/ML (ref 3.1–17.5)
GFR SERPL CREATININE-BSD FRML MDRD: >60 ML/MIN/1.73SQ M
GLUCOSE P FAST SERPL-MCNC: 142 MG/DL (ref 65–99)
POTASSIUM SERPL-SCNC: 4.3 MMOL/L (ref 3.5–5.3)
SODIUM SERPL-SCNC: 141 MMOL/L (ref 136–145)

## 2017-06-03 PROCEDURE — 84165 PROTEIN E-PHORESIS SERUM: CPT

## 2017-06-03 PROCEDURE — 83010 ASSAY OF HAPTOGLOBIN QUANT: CPT

## 2017-06-03 PROCEDURE — 80048 BASIC METABOLIC PNL TOTAL CA: CPT

## 2017-06-03 PROCEDURE — 82746 ASSAY OF FOLIC ACID SERUM: CPT

## 2017-06-03 PROCEDURE — 36415 COLL VENOUS BLD VENIPUNCTURE: CPT

## 2017-06-03 PROCEDURE — 83918 ORGANIC ACIDS TOTAL QUANT: CPT

## 2017-06-04 LAB — HAPTOGLOB SERPL-MCNC: 89 MG/DL (ref 34–200)

## 2017-06-05 ENCOUNTER — GENERIC CONVERSION - ENCOUNTER (OUTPATIENT)
Dept: OTHER | Facility: OTHER | Age: 62
End: 2017-06-05

## 2017-06-05 ENCOUNTER — ALLSCRIPTS OFFICE VISIT (OUTPATIENT)
Dept: OTHER | Facility: OTHER | Age: 62
End: 2017-06-05

## 2017-06-05 ENCOUNTER — TRANSCRIBE ORDERS (OUTPATIENT)
Dept: ADMINISTRATIVE | Facility: HOSPITAL | Age: 62
End: 2017-06-05

## 2017-06-05 DIAGNOSIS — R06.02 SOB (SHORTNESS OF BREATH): Primary | ICD-10-CM

## 2017-06-05 DIAGNOSIS — M79.661 RIGHT CALF PAIN: ICD-10-CM

## 2017-06-05 DIAGNOSIS — R16.1 SPLENOMEGALY, NOT ELSEWHERE CLASSIFIED: ICD-10-CM

## 2017-06-05 LAB
ALBUMIN SERPL ELPH-MCNC: 3.77 G/DL (ref 3.5–5)
ALBUMIN SERPL ELPH-MCNC: 61.8 % (ref 52–65)
ALPHA1 GLOB SERPL ELPH-MCNC: 0.63 G/DL (ref 0.1–0.4)
ALPHA1 GLOB SERPL ELPH-MCNC: 10.4 % (ref 2.5–5)
ALPHA2 GLOB SERPL ELPH-MCNC: 0.63 G/DL (ref 0.4–1.2)
ALPHA2 GLOB SERPL ELPH-MCNC: 10.3 % (ref 7–13)
BETA GLOB ABNORMAL SERPL ELPH-MCNC: 0.43 G/DL (ref 0.4–0.8)
BETA1 GLOB SERPL ELPH-MCNC: 7.1 % (ref 5–13)
BETA2 GLOB SERPL ELPH-MCNC: 3 % (ref 2–8)
BETA2+GAMMA GLOB SERPL ELPH-MCNC: 0.18 G/DL (ref 0.2–0.5)
GAMMA GLOB ABNORMAL SERPL ELPH-MCNC: 0.45 G/DL (ref 0.5–1.6)
GAMMA GLOB SERPL ELPH-MCNC: 7.4 % (ref 12–22)
IGG/ALB SER: 1.62 {RATIO} (ref 1.1–1.8)
PROT SERPL-MCNC: 6.1 G/DL (ref 6.4–8.2)

## 2017-06-06 ENCOUNTER — APPOINTMENT (OUTPATIENT)
Dept: LAB | Facility: HOSPITAL | Age: 62
End: 2017-06-06
Attending: INTERNAL MEDICINE
Payer: COMMERCIAL

## 2017-06-06 ENCOUNTER — HOSPITAL ENCOUNTER (OUTPATIENT)
Dept: CT IMAGING | Facility: HOSPITAL | Age: 62
Discharge: HOME | End: 2017-06-06
Attending: SURGERY
Payer: COMMERCIAL

## 2017-06-06 ENCOUNTER — TRANSCRIBE ORDERS (OUTPATIENT)
Dept: ADMINISTRATIVE | Facility: HOSPITAL | Age: 62
End: 2017-06-06

## 2017-06-06 ENCOUNTER — APPOINTMENT (EMERGENCY)
Dept: CT IMAGING | Facility: HOSPITAL | Age: 62
DRG: 175 | End: 2017-06-06
Payer: COMMERCIAL

## 2017-06-06 ENCOUNTER — APPOINTMENT (OUTPATIENT)
Dept: ULTRASOUND IMAGING | Facility: HOSPITAL | Age: 62
End: 2017-06-06
Attending: SURGERY
Payer: COMMERCIAL

## 2017-06-06 ENCOUNTER — HOSPITAL ENCOUNTER (INPATIENT)
Facility: HOSPITAL | Age: 62
LOS: 3 days | Discharge: HOME | DRG: 175 | End: 2017-06-09
Attending: EMERGENCY MEDICINE | Admitting: INTERNAL MEDICINE
Payer: COMMERCIAL

## 2017-06-06 ENCOUNTER — HOSPITAL ENCOUNTER (OUTPATIENT)
Dept: NON INVASIVE DIAGNOSTICS | Facility: HOSPITAL | Age: 62
Discharge: HOME | End: 2017-06-06
Attending: SURGERY
Payer: COMMERCIAL

## 2017-06-06 ENCOUNTER — GENERIC CONVERSION - ENCOUNTER (OUTPATIENT)
Dept: OTHER | Facility: OTHER | Age: 62
End: 2017-06-06

## 2017-06-06 DIAGNOSIS — I81 PORTAL VEIN THROMBOSIS: ICD-10-CM

## 2017-06-06 DIAGNOSIS — D58.9 HEREDITARY HEMOLYTIC ANEMIA, UNSPECIFIED (HCC): ICD-10-CM

## 2017-06-06 DIAGNOSIS — R16.1 SPLENOMEGALY, NOT ELSEWHERE CLASSIFIED: ICD-10-CM

## 2017-06-06 DIAGNOSIS — D58.9 HEREDITARY HEMOLYTIC ANEMIA, UNSPECIFIED (HCC): Primary | ICD-10-CM

## 2017-06-06 DIAGNOSIS — D59.9 ACUTE HEMOLYTIC ANEMIA (HCC): ICD-10-CM

## 2017-06-06 DIAGNOSIS — I26.99 PULMONARY EMBOLISM (HCC): ICD-10-CM

## 2017-06-06 DIAGNOSIS — D59.11 HEMOLYTIC ANEMIA DUE TO WARM ANTIBODY (HCC): ICD-10-CM

## 2017-06-06 DIAGNOSIS — I26.99 ACUTE PULMONARY EMBOLISM (HCC): Primary | ICD-10-CM

## 2017-06-06 DIAGNOSIS — D58.9 HEMOLYTIC ANEMIA (HCC): ICD-10-CM

## 2017-06-06 LAB
ABO GROUP BLD: NORMAL
ALBUMIN SERPL BCP-MCNC: 3.7 G/DL (ref 3.5–5)
ALBUMIN SERPL BCP-MCNC: 3.7 G/DL (ref 3.5–5)
ALP SERPL-CCNC: 120 U/L (ref 46–116)
ALP SERPL-CCNC: 120 U/L (ref 46–116)
ALT SERPL W P-5'-P-CCNC: 43 U/L (ref 12–78)
ALT SERPL W P-5'-P-CCNC: 43 U/L (ref 12–78)
ANION GAP SERPL CALCULATED.3IONS-SCNC: 9 MMOL/L (ref 4–13)
ANISOCYTOSIS BLD QL SMEAR: PRESENT
ANISOCYTOSIS BLD QL SMEAR: PRESENT
APTT PPP: 18 SECONDS (ref 23–35)
AST SERPL W P-5'-P-CCNC: 44 U/L (ref 5–45)
AST SERPL W P-5'-P-CCNC: 44 U/L (ref 5–45)
ATRIAL RATE: 93 BPM
BASOPHILS # BLD MANUAL: 0 THOUSAND/UL (ref 0–0.1)
BASOPHILS # BLD MANUAL: 0.16 THOUSAND/UL (ref 0–0.1)
BASOPHILS NFR MAR MANUAL: 0 % (ref 0–1)
BASOPHILS NFR MAR MANUAL: 1 % (ref 0–1)
BILIRUB DIRECT SERPL-MCNC: 0.49 MG/DL (ref 0–0.2)
BILIRUB SERPL-MCNC: 2.97 MG/DL (ref 0.2–1)
BILIRUB SERPL-MCNC: 3.14 MG/DL (ref 0.2–1)
BLD GP AB SCN SERPL QL: POSITIVE
BUN SERPL-MCNC: 14 MG/DL (ref 5–25)
CALCIUM SERPL-MCNC: 9.3 MG/DL (ref 8.3–10.1)
CHLORIDE SERPL-SCNC: 105 MMOL/L (ref 100–108)
CO2 SERPL-SCNC: 28 MMOL/L (ref 21–32)
CREAT SERPL-MCNC: 0.97 MG/DL (ref 0.6–1.3)
EOSINOPHIL # BLD MANUAL: 0 THOUSAND/UL (ref 0–0.4)
EOSINOPHIL # BLD MANUAL: 0 THOUSAND/UL (ref 0–0.4)
EOSINOPHIL NFR BLD MANUAL: 0 % (ref 0–6)
EOSINOPHIL NFR BLD MANUAL: 0 % (ref 0–6)
ERYTHROCYTE [DISTWIDTH] IN BLOOD BY AUTOMATED COUNT: 22.6 % (ref 11.6–15.1)
ERYTHROCYTE [DISTWIDTH] IN BLOOD BY AUTOMATED COUNT: 23.2 % (ref 11.6–15.1)
FDP BLD QL AGGL: >20 <40
FERRITIN SERPL-MCNC: 1579 NG/ML (ref 8–388)
FIBRINOGEN PPP-MCNC: 586 MG/DL (ref 227–495)
GFR SERPL CREATININE-BSD FRML MDRD: >60 ML/MIN/1.73SQ M
GIANT PLATELETS BLD QL SMEAR: PRESENT
GIANT PLATELETS BLD QL SMEAR: PRESENT
GLUCOSE SERPL-MCNC: 114 MG/DL (ref 65–140)
HCT VFR BLD AUTO: 18.9 % (ref 36.5–49.3)
HCT VFR BLD AUTO: 20 % (ref 36.5–49.3)
HGB BLD-MCNC: 5.5 G/DL (ref 12–17)
HGB BLD-MCNC: 5.9 G/DL (ref 12–17)
INR PPP: 1.07 (ref 0.86–1.16)
IRON SERPL-MCNC: 54 UG/DL (ref 65–175)
LG PLATELETS BLD QL SMEAR: PRESENT
LG PLATELETS BLD QL SMEAR: PRESENT
LYMPHOCYTES # BLD AUTO: 15 % (ref 14–44)
LYMPHOCYTES # BLD AUTO: 18 % (ref 14–44)
LYMPHOCYTES # BLD AUTO: 2.34 THOUSAND/UL (ref 0.6–4.47)
LYMPHOCYTES # BLD AUTO: 2.88 THOUSAND/UL (ref 0.6–4.47)
MACROCYTES BLD QL AUTO: PRESENT
MACROCYTES BLD QL AUTO: PRESENT
MCH RBC QN AUTO: 36.7 PG (ref 26.8–34.3)
MCH RBC QN AUTO: 39.6 PG (ref 26.8–34.3)
MCHC RBC AUTO-ENTMCNC: 29.1 G/DL (ref 31.4–37.4)
MCHC RBC AUTO-ENTMCNC: 29.5 G/DL (ref 31.4–37.4)
MCV RBC AUTO: 126 FL (ref 82–98)
MCV RBC AUTO: 134 FL (ref 82–98)
METHYLMALONATE SERPL-SCNC: 127 NMOL/L (ref 0–378)
MONOCYTES # BLD AUTO: 0.47 THOUSAND/UL (ref 0–1.22)
MONOCYTES # BLD AUTO: 0.96 THOUSAND/UL (ref 0–1.22)
MONOCYTES NFR BLD: 3 % (ref 4–12)
MONOCYTES NFR BLD: 6 % (ref 4–12)
NEUTROPHILS # BLD MANUAL: 12.01 THOUSAND/UL (ref 1.85–7.62)
NEUTROPHILS # BLD MANUAL: 12.78 THOUSAND/UL (ref 1.85–7.62)
NEUTS BAND NFR BLD MANUAL: 12 % (ref 0–8)
NEUTS BAND NFR BLD MANUAL: 9 % (ref 0–8)
NEUTS SEG NFR BLD AUTO: 63 % (ref 43–75)
NEUTS SEG NFR BLD AUTO: 73 % (ref 43–75)
NRBC BLD AUTO-RTO: 32 /100 WBC (ref 0–2)
NRBC BLD AUTO-RTO: 32 /100 WBCS
NRBC BLD AUTO-RTO: 35 /100 WBC (ref 0–2)
NT-PROBNP SERPL-MCNC: 488 PG/ML
P AXIS: 55 DEGREES
PLATELET # BLD AUTO: 1021 THOUSANDS/UL (ref 149–390)
PLATELET # BLD AUTO: 742 THOUSANDS/UL (ref 149–390)
PLATELET BLD QL SMEAR: ABNORMAL
PLATELET BLD QL SMEAR: ABNORMAL
PMV BLD AUTO: 10 FL (ref 8.9–12.7)
PMV BLD AUTO: 9.9 FL (ref 8.9–12.7)
POLYCHROMASIA BLD QL SMEAR: PRESENT
POLYCHROMASIA BLD QL SMEAR: PRESENT
POTASSIUM SERPL-SCNC: 5 MMOL/L (ref 3.5–5.3)
PR INTERVAL: 144 MS
PROT SERPL-MCNC: 7 G/DL (ref 6.4–8.2)
PROT SERPL-MCNC: 7.1 G/DL (ref 6.4–8.2)
PROTHROMBIN TIME: 13.9 SECONDS (ref 12.1–14.4)
QRS AXIS: 57 DEGREES
QRSD INTERVAL: 82 MS
QT INTERVAL: 354 MS
QTC INTERVAL: 440 MS
RBC # BLD AUTO: 1.49 MILLION/UL (ref 3.88–5.62)
RBC # BLD AUTO: 1.5 MILLION/UL (ref 3.88–5.62)
RH BLD: POSITIVE
SODIUM SERPL-SCNC: 142 MMOL/L (ref 136–145)
SPECIMEN EXPIRATION DATE: NORMAL
SPHEROCYTES BLD QL SMEAR: PRESENT
SPHEROCYTES BLD QL SMEAR: PRESENT
T WAVE AXIS: 26 DEGREES
TIBC SERPL-MCNC: 356 UG/DL (ref 250–450)
TOTAL CELLS COUNTED SPEC: 100
TOTAL CELLS COUNTED SPEC: 100
TROPONIN I SERPL-MCNC: <0.02 NG/ML
VENTRICULAR RATE: 93 BPM
WBC # BLD AUTO: 15.59 THOUSAND/UL (ref 4.31–10.16)
WBC # BLD AUTO: 16.01 THOUSAND/UL (ref 4.31–10.16)

## 2017-06-06 PROCEDURE — P9016 RBC LEUKOCYTES REDUCED: HCPCS

## 2017-06-06 PROCEDURE — 85362 FIBRIN DEGRADATION PRODUCTS: CPT | Performed by: EMERGENCY MEDICINE

## 2017-06-06 PROCEDURE — 85027 COMPLETE CBC AUTOMATED: CPT | Performed by: EMERGENCY MEDICINE

## 2017-06-06 PROCEDURE — 30233N1 TRANSFUSION OF NONAUTOLOGOUS RED BLOOD CELLS INTO PERIPHERAL VEIN, PERCUTANEOUS APPROACH: ICD-10-PCS | Performed by: INTERNAL MEDICINE

## 2017-06-06 PROCEDURE — 84484 ASSAY OF TROPONIN QUANT: CPT | Performed by: EMERGENCY MEDICINE

## 2017-06-06 PROCEDURE — 36430 TRANSFUSION BLD/BLD COMPNT: CPT

## 2017-06-06 PROCEDURE — 85007 BL SMEAR W/DIFF WBC COUNT: CPT

## 2017-06-06 PROCEDURE — 86922 COMPATIBILITY TEST ANTIGLOB: CPT

## 2017-06-06 PROCEDURE — 93970 EXTREMITY STUDY: CPT

## 2017-06-06 PROCEDURE — 86921 COMPATIBILITY TEST INCUBATE: CPT

## 2017-06-06 PROCEDURE — 85610 PROTHROMBIN TIME: CPT | Performed by: EMERGENCY MEDICINE

## 2017-06-06 PROCEDURE — 93005 ELECTROCARDIOGRAM TRACING: CPT | Performed by: EMERGENCY MEDICINE

## 2017-06-06 PROCEDURE — 83880 ASSAY OF NATRIURETIC PEPTIDE: CPT | Performed by: EMERGENCY MEDICINE

## 2017-06-06 PROCEDURE — 99285 EMERGENCY DEPT VISIT HI MDM: CPT

## 2017-06-06 PROCEDURE — 80076 HEPATIC FUNCTION PANEL: CPT | Performed by: NURSE PRACTITIONER

## 2017-06-06 PROCEDURE — 86901 BLOOD TYPING SEROLOGIC RH(D): CPT | Performed by: EMERGENCY MEDICINE

## 2017-06-06 PROCEDURE — 86900 BLOOD TYPING SEROLOGIC ABO: CPT | Performed by: EMERGENCY MEDICINE

## 2017-06-06 PROCEDURE — 80053 COMPREHEN METABOLIC PANEL: CPT | Performed by: EMERGENCY MEDICINE

## 2017-06-06 PROCEDURE — 83540 ASSAY OF IRON: CPT

## 2017-06-06 PROCEDURE — 36415 COLL VENOUS BLD VENIPUNCTURE: CPT

## 2017-06-06 PROCEDURE — 82728 ASSAY OF FERRITIN: CPT

## 2017-06-06 PROCEDURE — 85730 THROMBOPLASTIN TIME PARTIAL: CPT | Performed by: EMERGENCY MEDICINE

## 2017-06-06 PROCEDURE — 85384 FIBRINOGEN ACTIVITY: CPT | Performed by: EMERGENCY MEDICINE

## 2017-06-06 PROCEDURE — 74177 CT ABD & PELVIS W/CONTRAST: CPT

## 2017-06-06 PROCEDURE — 85027 COMPLETE CBC AUTOMATED: CPT

## 2017-06-06 PROCEDURE — 85007 BL SMEAR W/DIFF WBC COUNT: CPT | Performed by: EMERGENCY MEDICINE

## 2017-06-06 PROCEDURE — 71275 CT ANGIOGRAPHY CHEST: CPT

## 2017-06-06 PROCEDURE — A9270 NON-COVERED ITEM OR SERVICE: HCPCS | Performed by: EMERGENCY MEDICINE

## 2017-06-06 PROCEDURE — 86850 RBC ANTIBODY SCREEN: CPT | Performed by: EMERGENCY MEDICINE

## 2017-06-06 PROCEDURE — 83550 IRON BINDING TEST: CPT

## 2017-06-06 PROCEDURE — 86902 BLOOD TYPE ANTIGEN DONOR EA: CPT

## 2017-06-06 RX ORDER — HEPARIN SODIUM 1000 [USP'U]/ML
6400 INJECTION, SOLUTION INTRAVENOUS; SUBCUTANEOUS AS NEEDED
Status: DISCONTINUED | OUTPATIENT
Start: 2017-06-06 | End: 2017-06-07

## 2017-06-06 RX ORDER — HEPARIN SODIUM 1000 [USP'U]/ML
3200 INJECTION, SOLUTION INTRAVENOUS; SUBCUTANEOUS AS NEEDED
Status: DISCONTINUED | OUTPATIENT
Start: 2017-06-06 | End: 2017-06-07

## 2017-06-06 RX ORDER — PREDNISONE 20 MG/1
60 TABLET ORAL ONCE
Status: COMPLETED | OUTPATIENT
Start: 2017-06-06 | End: 2017-06-06

## 2017-06-06 RX ORDER — HEPARIN SODIUM 1000 [USP'U]/ML
6400 INJECTION, SOLUTION INTRAVENOUS; SUBCUTANEOUS ONCE
Status: COMPLETED | OUTPATIENT
Start: 2017-06-06 | End: 2017-06-06

## 2017-06-06 RX ORDER — HEPARIN SODIUM 10000 [USP'U]/100ML
3-30 INJECTION, SOLUTION INTRAVENOUS
Status: DISCONTINUED | OUTPATIENT
Start: 2017-06-06 | End: 2017-06-06 | Stop reason: SDUPTHER

## 2017-06-06 RX ORDER — PANTOPRAZOLE SODIUM 40 MG/1
40 TABLET, DELAYED RELEASE ORAL
Status: DISCONTINUED | OUTPATIENT
Start: 2017-06-07 | End: 2017-06-09 | Stop reason: HOSPADM

## 2017-06-06 RX ORDER — PREDNISONE 20 MG/1
80 TABLET ORAL ONCE
Status: DISCONTINUED | OUTPATIENT
Start: 2017-06-06 | End: 2017-06-06

## 2017-06-06 RX ORDER — ASPIRIN 81 MG/1
324 TABLET, CHEWABLE ORAL ONCE
Status: COMPLETED | OUTPATIENT
Start: 2017-06-06 | End: 2017-06-06

## 2017-06-06 RX ORDER — HEPARIN SODIUM 10000 [USP'U]/100ML
3-30 INJECTION, SOLUTION INTRAVENOUS
Status: DISCONTINUED | OUTPATIENT
Start: 2017-06-06 | End: 2017-06-07

## 2017-06-06 RX ORDER — DOCUSATE SODIUM 100 MG/1
100 CAPSULE, LIQUID FILLED ORAL 2 TIMES DAILY
Status: DISCONTINUED | OUTPATIENT
Start: 2017-06-06 | End: 2017-06-09 | Stop reason: HOSPADM

## 2017-06-06 RX ORDER — HEPARIN SODIUM 1000 [USP'U]/ML
6400 INJECTION, SOLUTION INTRAVENOUS; SUBCUTANEOUS AS NEEDED
Status: DISCONTINUED | OUTPATIENT
Start: 2017-06-06 | End: 2017-06-06 | Stop reason: SDUPTHER

## 2017-06-06 RX ORDER — FERROUS SULFATE 325(65) MG
325 TABLET ORAL 2 TIMES DAILY
Status: DISCONTINUED | OUTPATIENT
Start: 2017-06-06 | End: 2017-06-09 | Stop reason: HOSPADM

## 2017-06-06 RX ORDER — HEPARIN SODIUM 1000 [USP'U]/ML
3200 INJECTION, SOLUTION INTRAVENOUS; SUBCUTANEOUS AS NEEDED
Status: DISCONTINUED | OUTPATIENT
Start: 2017-06-06 | End: 2017-06-06 | Stop reason: SDUPTHER

## 2017-06-06 RX ORDER — CHOLECALCIFEROL (VITAMIN D3) 125 MCG
1000 CAPSULE ORAL DAILY
Status: DISCONTINUED | OUTPATIENT
Start: 2017-06-07 | End: 2017-06-09 | Stop reason: HOSPADM

## 2017-06-06 RX ORDER — PREDNISONE 1 MG/1
10 TABLET ORAL DAILY
Status: DISCONTINUED | OUTPATIENT
Start: 2017-06-07 | End: 2017-06-07

## 2017-06-06 RX ADMIN — PREDNISONE 60 MG: 20 TABLET ORAL at 18:23

## 2017-06-06 RX ADMIN — HEPARIN SODIUM AND DEXTROSE 18 UNITS/KG/HR: 10000; 5 INJECTION INTRAVENOUS at 20:18

## 2017-06-06 RX ADMIN — ASPIRIN 81 MG 324 MG: 81 TABLET ORAL at 16:48

## 2017-06-06 RX ADMIN — HEPARIN SODIUM 6400 UNITS: 1000 INJECTION, SOLUTION INTRAVENOUS; SUBCUTANEOUS at 20:15

## 2017-06-06 RX ADMIN — IODIXANOL 85 ML: 320 INJECTION, SOLUTION INTRAVASCULAR at 14:09

## 2017-06-06 RX ADMIN — IODIXANOL 100 ML: 320 INJECTION, SOLUTION INTRAVASCULAR at 19:12

## 2017-06-06 NOTE — IP AVS SNAPSHOT
[address], [address] [place] [address]   [phone]            After Visit Summary for:             Lyudmila Majano   60 yrs Male (1955)    MRN:  [id number]           About your hospitalization     You were admitted on:  June 6, 2017 You last received care in the:  [address]    You were discharged on:  June 9, 2017 Unit phone number:  [phone]         Medications     It is important that you maintain an up-to-date list of medications (prescribed and over-the-counter, as well as vitamins and mineral supplements) that you are taking  Bring your list of current medications whenever you seek treatment at a hospital or other healthcare setting  If you have any questions or concerns, contact your primary care physician's office             Your Medications      TAKE these medications     aspirin 81 MG tablet   Take 81 mg by mouth daily   Refills:  0           cyanocobalamin 1,000 mcg tablet   Take 1,000 mcg by mouth daily   Last time this was given:  1,000 mcg on 6/9/2017 10:14 AM   Refills:  0   Commonly known as:  VITAMIN B-12           ergocalciferol 50,000 units   Take 50,000 Units by mouth once a week   Refills:  0   Commonly known as:  VITAMIN D2           Iron 325 (65 Fe) MG Tabs   Take 325 mg by mouth daily   Last time this was given:  325 mg on 6/9/2017 10:14 AM   Refills:  0           ONE DAILY MENS Tabs   Take by mouth   Refills:  0           pantoprazole 40 mg tablet   Take 1 tablet by mouth daily in the early morning   Last time this was given:  40 mg on 6/9/2017  6:35 AM   Refills:  0   Commonly known as:  PROTONIX           predniSONE 20 mg tablet   Take 4 tablets by mouth daily for 14 days   Last time this was given:  80 mg on 6/9/2017 10:14 AM   Refills:  0           rivaroxaban 15 mg tablet   Take 1 tablet by mouth 2 (two) times a day with meals for 21 days   Last time this was given:  15 mg on 6/9/2017 10:14 AM Refills:  0   Commonly known as:  XARELTO           rivaroxaban 20 mg tablet   Take 1 tablet by mouth daily with breakfast   Last time this was given:  15 mg on 6/9/2017 10:14 AM   Refills:  2   Commonly known as:  Juanito Lund                Where to Get Your Medications      You can get these medications from any pharmacy     Bring a paper prescription for each of these medications     pantoprazole 40 mg tablet    predniSONE 20 mg tablet    rivaroxaban 15 mg tablet    rivaroxaban 20 mg tablet                  Your Medications      Your Medication List           Morning Noon Evening Bedtime As Needed    aspirin 81 MG tablet   Take 81 mg by mouth daily                                cyanocobalamin 1,000 mcg tablet   Take 1,000 mcg by mouth daily   Last time this was given:  1,000 mcg on 6/9/2017 10:14 AM   Commonly known as:  VITAMIN B-12                                ergocalciferol 50,000 units   Take 50,000 Units by mouth once a week   Commonly known as:  VITAMIN D2                                Iron 325 (65 Fe) MG Tabs   Take 325 mg by mouth daily   Last time this was given:  325 mg on 6/9/2017 10:14 AM                                ONE DAILY MENS Tabs   Take by mouth                                pantoprazole 40 mg tablet   Take 1 tablet by mouth daily in the early morning   Last time this was given:  40 mg on 6/9/2017  6:35 AM   Commonly known as:  PROTONIX                                predniSONE 20 mg tablet   Take 4 tablets by mouth daily for 14 days   Last time this was given:  80 mg on 6/9/2017 10:14 AM                                * rivaroxaban 15 mg tablet   Take 1 tablet by mouth 2 (two) times a day with meals for 21 days   Last time this was given:  15 mg on 6/9/2017 10:14 AM   Commonly known as:  XARELTO                                * rivaroxaban 20 mg tablet   Take 1 tablet by mouth daily with breakfast   Last time this was given:  15 mg on 6/9/2017 10:14 AM   Commonly known as:  Juanito Lund * Notice: This list has 2 medication(s) that are the same as other medications prescribed for you  Read the directions carefully, and ask your doctor or other care provider to review them with you  Follow-ups for After Discharge        Follow-up Information     Follow up with Slava Castle DO Follow up in 1 week(s)  Specialty:  Family Medicine    Contact information:    [address] [id number]  [phone]          Follow up with Julio Valles DO Follow up in 1 week(s)      Specialty:  Hematology and Oncology    Contact information:    [address]  [place] [address]  [phone]        Referrals and Follow-ups to Schedule     CBC and Platelet       Copy Slava Castle DO and Julio Valles DO             Pending Labs     Order Current Status    Glucose 6 phosphate dehydrogenase In process    Lactate dehydrogenase, isoenzymes In process    PNH profile, WBC In process      Immunizations     Name Date      Influenza, Quadrivalent 02/05/17     Pneumococcal Conjugate 13-Valent 02/05/17       Your Allergies  Date Reviewed: 6/6/2017    Allergen Reactions    Iodinated Diagnostic Agents Hives      POLST/Adv Directive          Most Recent Value    POLST  Patient does not have POLST - would not like information    Advance Directive  Patient has advance directive, copy not in chart    Type of Healthcare Directive  Living will          Instructions for After Discharge           Summary of Your Hospitalization        Reason for Hospitalization     Your primary diagnosis was:  Acute Pulmonary Embolism    Your diagnoses also included:  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, Hyperbilirubinemia, Portal Vein Thrombosis, Elevated Blood Pressure Reading Without Diagnosis Of Hypertension      Chief Complaint     Shortness of Breath      Attending/Consulting Providers     Provider Service Role Specialty Primary office phone    Mel Miller DO General Medicine Attending Provider Internal Medicine [phone]    Josefina Callahan MD -- Consulting Physician  Hematology [phone]    Gurdeep Uribe DO Oncology-Medical Consulting Physician  Hematology and Oncology  [phone]         Last Resulted Labs     Date/Time Component Value Reference Range Units Lab Status    06/09/17 0645 WBC 20 81 4 31 - 10 16 Thousand/uL Thousand/uL Final result    06/09/17 0645 HGB 8 8 12 0 - 17 0 g/dL g/dL Final result    06/09/17 0645 HCT 28 8 36 5 - 49 3 % % Final result    06/09/17 0645  149 - 390 Thousands/uL Thousands/uL Final result    06/07/17 0415 BANDSPCT 6 0 - 8 % % Final result    06/09/17 0645  136 - 145 mmol/L mmol/L Final result    06/09/17 0645 K 3 5 3 5 - 5 3 mmol/L mmol/L Final result    06/09/17 0645  100 - 108 mmol/L mmol/L Final result    06/09/17 0645 CO2 30 21 - 32 mmol/L mmol/L Final result    06/09/17 0645 BUN 16 5 - 25 mg/dL mg/dL Final result    06/09/17 0645 CREATININE 0 91 0 60 - 1 30 mg/dL mg/dL Final result    06/09/17 0645 GLUCOSE 100 65 - 140 mg/dL mg/dL Final result    06/09/17 0645 ALKPHOS 97 46 - 116 U/L U/L Final result    06/09/17 0645 ALT 29 12 - 78 U/L U/L Final result    06/09/17 0645 AST 24 5 - 45 U/L U/L Final result    06/09/17 0645 ALBUMIN 3 2 3 5 - 5 0 g/dL g/dL Final result    06/09/17 0645 BILITOT 2 38 0 20 - 1 00 mg/dL mg/dL Final result    11/02/16 1442 LIPASE 174 73 - 393 u/L u/L Final result    06/06/17 1636 TROPONINI <0 02 <=0 04 ng/mL ng/mL Final result    06/07/17 0415 INR 1 17 0 86 - 1 16 - Final result      Fashion To Figure     To access this document and other health information online, please visit [email or web address] login or create a patient portal account  For questions about any pending test results, you may contact the ordering physician or your primary care provider              Discharge Weight          Most Recent Value    Weight  83 2 kg (183 lb 6 8 oz) filed at 06/07/2017 0623      Educational Information     Venous Thromboembolism (VTE) / Deep Vein Thrombosis (DVT) Prevention   VTE/DVT is a disease caused by blood clots that form in veins  Blood clots can be serious and common in patients with risk factors  VTE/DVT may occur after discharge  To decrease risks, take anticoagulation medicine if ordered by your doctor  Report any calf pain, swelling or tenderness and/or shortness of breath to your doctor immediately  Smoking Cessation   If you smoke, use tobacco or nicotine, and/or are exposed to second hand smoke, you are encouraged to stop to improve your health  If you need help quitting, please talk to your health care provider or call:  · [facility]  ([phone])  · [facility] ([phone])   · [address] [place] [address] ([phone])      If your symptoms return or if your condition unexpectedly worsens from the time of discharge, notify your doctor or go to the nearest emergency room  If you think you are experiencing a medical emergency, call [phone]  Readmission Risk Score     Jose Brar is committed to ensuring a safe discharge plan that will allow you to continue your recovery at home  Your risk for readmission has been determined to be HIGH      To prevent readmission, please be sure to:   See your health care provider within 1 week of discharge   Follow your discharge instructions    Take your medication as prescribed   Call your health care provider with any questions or concerns

## 2017-06-06 NOTE — ED NOTES
Pt is unable to provide stool sample at this time   Dr Rosalino Connelly made aware      Kalyan De La Fuente, RN  06/06/17 7113

## 2017-06-06 NOTE — ED PROVIDER NOTES
History  Chief Complaint   Patient presents with    Shortness of Breath     Patient presents complaining of cough and shortness of breath, worsened by exertion  Shares that he had his spleen removed on May 18th, and had CT earlier this morning which showed abnormality  Patient unsure if it was PE  Also complaining of some tingling in left lower extremity, for which he had "ultrasound earlier today"  Patient is a 60 y o  male presenting with shortness of breath  History provided by:  Patient   used: No    Shortness of Breath   Severity:  Moderate  Onset quality:  Gradual  Timing:  Constant  Progression:  Worsening  Chronicity:  New  Context: activity    Relieved by:  Rest  Worsened by:  Exertion and deep breathing  Ineffective treatments:  None tried  Associated symptoms: no abdominal pain, no chest pain, no cough, no diaphoresis, no fever, no headaches, no neck pain, no rash, no sore throat and no vomiting        Prior to Admission Medications   Prescriptions Last Dose Informant Patient Reported? Taking?    Ferrous Sulfate (IRON) 325 (65 FE) MG TABS   Yes Yes   Sig: Take 325 mg by mouth daily     Multiple Vitamins-Minerals (ONE DAILY MENS) TABS   Yes Yes   Sig: Take by mouth   aspirin 81 MG tablet   Yes Yes   Sig: Take 81 mg by mouth daily   cyanocobalamin (VITAMIN B-12) 1,000 mcg tablet   Yes Yes   Sig: Take 1,000 mcg by mouth daily   ergocalciferol (VITAMIN D2) 50,000 units   Yes Yes   Sig: Take 50,000 Units by mouth once a week     pantoprazole (PROTONIX) 40 mg tablet   No Yes   Sig: Take 1 tablet by mouth daily in the early morning for 30 days   predniSONE 10 mg tablet   No Yes   Sig: Take 1 tablet by mouth daily for 30 days   Patient taking differently: Take 20 mg by mouth daily        Facility-Administered Medications: None       Past Medical History:   Diagnosis Date    Hemolytic anemia     Palpitation     Tobacco abuse        Past Surgical History:   Procedure Laterality Date  KNEE SURGERY Right     SC REMOVAL SPLEEN, TOTAL N/A 5/18/2017    Procedure: LAPAROSCOPIC HAND ASSIST SPLENECTOMY;  Surgeon: Johnathan Silva MD;  Location: [facility];  Service: Surgical Oncology    SHOULDER SURGERY Left        History reviewed  No pertinent family history  I have reviewed and agree with the history as documented  Social History   Substance Use Topics    Smoking status: Current Some Day Smoker     Types: Cigars    Smokeless tobacco: Not on file      Comment: socially    Alcohol use Yes      Comment: social        Review of Systems   Constitutional: Negative for activity change, appetite change, diaphoresis, fatigue and fever  HENT: Negative for congestion, rhinorrhea, sore throat and trouble swallowing  Eyes: Negative for pain and visual disturbance  Respiratory: Positive for shortness of breath  Negative for apnea, cough and chest tightness  Cardiovascular: Negative for chest pain  Gastrointestinal: Negative for abdominal pain, diarrhea, nausea and vomiting  Endocrine: Negative for polyphagia and polyuria  Genitourinary: Negative for dysuria, flank pain, frequency, hematuria and urgency  Musculoskeletal: Negative for back pain, gait problem, neck pain and neck stiffness  Skin: Negative for color change and rash  Allergic/Immunologic: Negative for immunocompromised state  Neurological: Negative for dizziness, speech difficulty, numbness and headaches  Hematological: Does not bruise/bleed easily  Psychiatric/Behavioral: Negative for confusion and decreased concentration  Physical Exam  ED Triage Vitals [06/06/17 1509]   Temperature Pulse Respirations Blood Pressure SpO2   98 5 °F (36 9 °C) 95 20 143/64 99 %      Temp Source Heart Rate Source Patient Position BP Location FiO2 (%)   Temporal Monitor Sitting Right arm --      Pain Score       No Pain           Physical Exam   Constitutional: He is oriented to person, place, and time   He appears well-developed and well-nourished  HENT:   Head: Normocephalic and atraumatic  Eyes: Conjunctivae and EOM are normal  Pupils are equal, round, and reactive to light  Scleral icterus is present  Neck: Normal range of motion  Neck supple  Cardiovascular: Normal rate and regular rhythm  Pulmonary/Chest: Effort normal and breath sounds normal  No respiratory distress  Abdominal: Soft  Bowel sounds are normal  He exhibits no distension  There is no tenderness  There is no rebound and no guarding  Musculoskeletal: Normal range of motion  He exhibits no tenderness or deformity  Lymphadenopathy:     He has no cervical adenopathy  Neurological: He is alert and oriented to person, place, and time  Coordination normal    Skin: Skin is warm and dry  He is not diaphoretic  Psychiatric: He has a normal mood and affect  Vitals reviewed        ED Medications  Medications   aspirin chewable tablet 324 mg (324 mg Oral Given 6/6/17 1648)   predniSONE tablet 60 mg (60 mg Oral Given 6/6/17 1823)   iodixanol (VISIPAQUE) 320 MG/ML injection 100 mL (100 mL Intravenous Given 6/6/17 1912)   heparin (porcine) injection 6,400 Units (6,400 Units Intravenous Given 6/6/17 2015)   iron sucrose (VENOFER) 300 mg in sodium chloride 0 9 % 250 mL IVPB (0 mg Intravenous Stopped 6/7/17 1413)       Diagnostic Studies  Labs Reviewed   COMPREHENSIVE METABOLIC PANEL - Abnormal        Result Value Ref Range Status    Alkaline Phosphatase 120 (*) 46 - 116 U/L Final    Total Bilirubin 2 97 (*) 0 20 - 1 00 mg/dL Final    Sodium 142  136 - 145 mmol/L Final    Potassium 5 0  3 5 - 5 3 mmol/L Final    Chloride 105  100 - 108 mmol/L Final    CO2 28  21 - 32 mmol/L Final    Anion Gap 9  4 - 13 mmol/L Final    BUN 14  5 - 25 mg/dL Final    Creatinine 0 97  0 60 - 1 30 mg/dL Final    Comment: Standardized to IDMS reference method    Glucose 114  65 - 140 mg/dL Final    Comment: If the patient is fasting, the ADA then defines impaired fasting glucose as > 100 mg/dL and diabetes as > or equal to 123 mg/dL  Calcium 9 3  8 3 - 10 1 mg/dL Final    AST 44  5 - 45 U/L Final    ALT 43  12 - 78 U/L Final    Total Protein 7 1  6 4 - 8 2 g/dL Final    Albumin 3 7  3 5 - 5 0 g/dL Final    eGFR >60 0  ml/min/1 73sq m Final    Narrative:     National Kidney Disease Education Program recommendations are as follows:  GFR calculation is accurate only with a steady state creatinine  Chronic Kidney disease less than 60 ml/min/1 73 sq  meters  Kidney failure less than 15 ml/min/1 73 sq  meters  NT-BNP PRO (BRAIN NATRIURETIC PEPTIDE) - Abnormal     NT-proBNP 488 (*) <125 pg/mL Final   CBC AND DIFFERENTIAL - Abnormal     WBC 16 01 (*) 4 31 - 10 16 Thousand/uL Final    RBC 1 50 (*) 3 88 - 5 62 Million/uL Final    Hemoglobin 5 5 (*) 12 0 - 17 0 g/dL Final    Comment: This result has been called to [address] [place] [address] Perla by Bean Mclaughlin on 06 06 2017 at [phone], and has been read back  Hematocrit 18 9 (*) 36 5 - 49 3 % Final     (*) 82 - 98 fL Final    MCH 36 7 (*) 26 8 - 34 3 pg Final    MCHC 29 1 (*) 31 4 - 37 4 g/dL Final    RDW 22 6 (*) 11 6 - 15 1 % Final    Platelets 919 (*) 275 - 390 Thousands/uL Final    MPV 9 9  8 9 - 12 7 fL Final    nRBC 32  /100 WBCs Final   APTT - Abnormal     PTT 18 (*) 23 - 35 seconds Final    Narrative: Therapeutic Heparin Range = 60-90 seconds  Therapeutic Heparin Range = 60-90 seconds   FIBRINOGEN - Abnormal     Fibrinogen 586 (*) 227 - 495 mg/dL Final   HEPATIC FUNCTION PANEL - Abnormal     Total Bilirubin 3 14 (*) 0 20 - 1 00 mg/dL Final    Bilirubin, Direct 0 49 (*) 0 00 - 0 20 mg/dL Final    Comment: Slightly Hemolyzed;  Results May be Affected    Alkaline Phosphatase 120 (*) 46 - 116 U/L Final    AST 44  5 - 45 U/L Final    ALT 43  12 - 78 U/L Final    Total Protein 7 0  6 4 - 8 2 g/dL Final    Albumin 3 7  3 5 - 5 0 g/dL Final   MANUAL DIFFERENTIAL(PHLEBS DO NOT ORDER) - Abnormal     Bands % 12 (*) 0 - 8 % Final    Absolute Neutrophils 12 01 (*) 1 85 - 7 62 Thousand/uL Final    Basophils Absolute 0 16 (*) 0 00 - 0 10 Thousand/uL Final    nRBC 32 (*) 0 - 2 /100 WBC Final    Platelet Estimate Increased (*) Adequate Final    Segmented % 63  43 - 75 % Final    Lymphocytes % 18  14 - 44 % Final    Monocytes % 6  4 - 12 % Final    Eosinophils % 0  0 - 6 % Final    Basophils % 1  0 - 1 % Final    Lymphocytes Absolute 2 88  0 60 - 4 47 Thousand/uL Final    Monocytes Absolute 0 96  0 00 - 1 22 Thousand/uL Final    Eosinophils Absolute 0 00  0 00 - 0 40 Thousand/uL Final    Total Counted 100   Final    Anisocytosis Present   Final    Macrocytes Present   Final    Polychromasia Present   Final    Spherocytes Present   Final    Giant PLTs Present   Final    Large Platelet Present   Final   TROPONIN I - Normal    Troponin I <0 02  <=0 04 ng/mL Final    Comment: 3Autovalidation override    Narrative:     [employer] Chemistry analyzer 99% cutoff is > 0 04 ng/mL in network labs    o cTnI 99% cutoff is useful only when applied to patients in the clinical setting of myocardial ischemia  o cTnI 99% cutoff should be interpreted in the context of clinical history, ECG findings and possibly cardiac imaging to establish correct diagnosis  o cTnI 99% cutoff may be suggestive but clearly not indicative of a coronary event without the clinical setting of myocardial ischemia  PROTIME-INR - Normal    Protime 13 9  12 1 - 14 4 seconds Final    INR 1 07  0 86 - 1 16 Final   TYPE AND SCREEN    ABO Grouping A   Final    Rh Factor Positive   Final    Antibody Screen Positive   Final    Specimen Expiration Date [id number]   Final       CT abdomen pelvis with contrast   Final Result      1  Post splenectomy  2   No intra-abdominal hemorrhage  3   Intrahepatic portal venous thrombosis  4   I have discussed my findings with SHADY Wells           Workstation performed: [id number]             Procedures  ECG 12 Lead Documentation  Date/Time: 6/6/2017 3:34 PM  Performed by: Cholo Madrid  Authorized by: Cholo Madrid     Patient location:  ED  Interpretation:     Interpretation: normal    Rate:     ECG rate assessment: normal    Rhythm:     Rhythm: sinus rhythm    Ectopy:     Ectopy: none    QRS:     QRS axis:  Normal  Conduction:     Conduction: normal    ST segments:     ST segments:  Normal  T waves:     T waves: normal    CriticalCare Time  Performed by: Cholo Madrid  Authorized by: Cholo Madrid     Critical care provider statement:     Critical care time (minutes):  81    Critical care time was exclusive of:  Separately billable procedures and treating other patients and teaching time    Critical care was necessary to treat or prevent imminent or life-threatening deterioration of the following conditions:  Circulatory failure    Critical care was time spent personally by me on the following activities:  Obtaining history from patient or surrogate, ordering and performing treatments and interventions, ordering and review of laboratory studies, re-evaluation of patient's condition, review of old charts, ordering and review of radiographic studies, examination of patient, evaluation of patient's response to treatment, discussions with consultants and development of treatment plan with patient or surrogate    I assumed direction of critical care for this patient from another provider in my specialty: no            Phone Contacts  ED Phone Contact    ED Course  ED Course   Sandro Logan's Documentation   Comment Time   Case discussed with hematology who is familiar with the patient's case  They recommend loading the patient with 1 mg/kg of prednisone and transfusing  Hemoccult was negative and there is no sign of bleeding on CT  No contraindication for anticoagulation  Discussed case with ICU advance practitioner Mar Host  Patient will be admitted to the critical care service and the level one step down unit   06/06 2014 MDM  Number of Diagnoses or Management Options  Acute hemolytic anemia: new and requires workup  Portal vein thrombosis: new and requires workup  Pulmonary embolism: new and requires workup  Diagnosis management comments: 59 y/o male presented after he had an outpatient CTA of the chest that showed multiple sub-segmental PEs  This patient has an unfortunate medical history of a hemolytic anemia of uncertain etiology, for which he recently underwent splenectomy  On a follow up visit with his surgeon, the patient noted SOB and leg swelling/pain and was sent for a stat OP CTA  This showed PEs as above  In the ED pt was then noted to also be critically anemic  CT of the abd was neg for postoperative bleeding but did show portal vein thrombosis  I discussed this complex case with multiple consultants including surgical oncology, hematology, internal medicine, and critical care  At this point we will send additional diagnostic tests as requested by hematology, transfuse with PRBCs and admit to the critical care service for anticoagulation and further eval/treatment  Amount and/or Complexity of Data Reviewed  Clinical lab tests: reviewed and ordered  Tests in the radiology section of CPT®: reviewed and ordered  Review and summarize past medical records: yes  Discuss the patient with other providers: yes  Independent visualization of images, tracings, or specimens: yes      CritCare Time    Disposition  Final diagnoses:   Pulmonary embolism   Portal vein thrombosis   Acute hemolytic anemia     ED Disposition     ED Disposition Condition Comment    Admit  Case was discussed with ICU and the patient's admission status was agreed to be Admission Status: inpatient status to the service of Dr Sully Martinez          Follow-up Information     Follow up With Specialties Details Why Contact Mansi Luciano DO Family Medicine Follow up in 1 week(s([phone] Bj PAVON Karen Horton DO Hematology and Oncology Follow up in 1 week(s)  [address]  [phone]          Discharge Medication List as of 6/9/2017 10:17 AM      START taking these medications    Details   !! rivaroxaban (XARELTO) 15 mg tablet Take 1 tablet by mouth 2 (two) times a day with meals for 21 days, Starting 6/9/2017, Until Fri 6/30/17, Print      !! rivaroxaban (XARELTO) 20 mg tablet Take 1 tablet by mouth daily with breakfast, Starting 6/9/2017, Until Discontinued, Print       !! - Potential duplicate medications found  Please discuss with provider  CONTINUE these medications which have CHANGED    Details   pantoprazole (PROTONIX) 40 mg tablet Take 1 tablet by mouth daily in the early morning, Starting 6/9/2017, Until Discontinued, Print      predniSONE 20 mg tablet Take 4 tablets by mouth daily for 14 days, Starting 6/9/2017, Until Fri 6/23/17, Print         CONTINUE these medications which have NOT CHANGED    Details   aspirin 81 MG tablet Take 81 mg by mouth daily, Until Discontinued, Historical Med      cyanocobalamin (VITAMIN B-12) 1,000 mcg tablet Take 1,000 mcg by mouth daily, Until Discontinued, Historical Med      ergocalciferol (VITAMIN D2) 50,000 units Take 50,000 Units by mouth once a week  , Until Discontinued, Historical Med      Ferrous Sulfate (IRON) 325 (65 FE) MG TABS Take 325 mg by mouth daily  , Until Discontinued, Historical Med      Multiple Vitamins-Minerals (ONE DAILY MENS) TABS Take by mouth, Until Discontinued, Historical Med         STOP taking these medications       docusate sodium (COLACE) 100 mg capsule Comments:   Reason for Stopping:               Outpatient Discharge Orders  CBC and Platelet   Standing Status: Future Number of Occurrences: 1 Standing Exp   Date: 06/09/18         ED Provider  Electronically Signed by       Mayo Herman MD  06/21/17 9721

## 2017-06-06 NOTE — ED NOTES
Pt ambuklated to bathroom at this time to provide stool sample for hemoccult      Katharine Pierce RN  06/06/17 5751

## 2017-06-06 NOTE — ED NOTES
Pt states he took 20mg of prednisone this am around 0730    Dr Solomon Loenard aware  Only 60mg of Prednisone to be given at this time per Dr Solomon Gallego, RN  06/06/17 4495

## 2017-06-06 NOTE — ED NOTES
Per Dr Mortimer Hanlon the blood bank notified him that the blood was going to take 3 hours        Jamin Diaz RN  06/06/17 3421

## 2017-06-07 ENCOUNTER — APPOINTMENT (INPATIENT)
Dept: NON INVASIVE DIAGNOSTICS | Facility: HOSPITAL | Age: 62
DRG: 175 | End: 2017-06-07
Payer: COMMERCIAL

## 2017-06-07 LAB
ABO GROUP BLD BPU: NORMAL
ABO GROUP BLD BPU: NORMAL
ALBUMIN SERPL BCP-MCNC: 3.2 G/DL (ref 3.5–5)
ALP SERPL-CCNC: 105 U/L (ref 46–116)
ALT SERPL W P-5'-P-CCNC: 35 U/L (ref 12–78)
ANION GAP SERPL CALCULATED.3IONS-SCNC: 10 MMOL/L (ref 4–13)
ANISOCYTOSIS BLD QL SMEAR: PRESENT
APTT PPP: 44 SECONDS (ref 23–35)
APTT PPP: 55 SECONDS (ref 23–35)
AST SERPL W P-5'-P-CCNC: 19 U/L (ref 5–45)
BASOPHILS # BLD MANUAL: 0 THOUSAND/UL (ref 0–0.1)
BASOPHILS NFR MAR MANUAL: 0 % (ref 0–1)
BILIRUB SERPL-MCNC: 2.2 MG/DL (ref 0.2–1)
BPU ID: NORMAL
BPU ID: NORMAL
BUN SERPL-MCNC: 16 MG/DL (ref 5–25)
CA-I BLD-SCNC: 1.14 MMOL/L (ref 1.12–1.32)
CALCIUM SERPL-MCNC: 8.8 MG/DL (ref 8.3–10.1)
CHLORIDE SERPL-SCNC: 106 MMOL/L (ref 100–108)
CO2 SERPL-SCNC: 26 MMOL/L (ref 21–32)
CREAT SERPL-MCNC: 0.99 MG/DL (ref 0.6–1.3)
CROSSMATCH: NORMAL
CROSSMATCH: NORMAL
EOSINOPHIL # BLD MANUAL: 0 THOUSAND/UL (ref 0–0.4)
EOSINOPHIL NFR BLD MANUAL: 0 % (ref 0–6)
ERYTHROCYTE [DISTWIDTH] IN BLOOD BY AUTOMATED COUNT: 26 % (ref 11.6–15.1)
GFR SERPL CREATININE-BSD FRML MDRD: >60 ML/MIN/1.73SQ M
GLUCOSE SERPL-MCNC: 164 MG/DL (ref 65–140)
HCT VFR BLD AUTO: 23.1 % (ref 36.5–49.3)
HGB BLD-MCNC: 7.3 G/DL (ref 12–17)
HOWELL-JOLLY BOD BLD QL SMEAR: PRESENT
INR PPP: 1.17 (ref 0.86–1.16)
LDH SERPL-CCNC: 491 U/L (ref 81–234)
LG PLATELETS BLD QL SMEAR: PRESENT
LYMPHOCYTES # BLD AUTO: 14 % (ref 14–44)
LYMPHOCYTES # BLD AUTO: 2.6 THOUSAND/UL (ref 0.6–4.47)
MACROCYTES BLD QL AUTO: PRESENT
MAGNESIUM SERPL-MCNC: 2.2 MG/DL (ref 1.6–2.6)
MCH RBC QN AUTO: 35.1 PG (ref 26.8–34.3)
MCHC RBC AUTO-ENTMCNC: 31.6 G/DL (ref 31.4–37.4)
MCV RBC AUTO: 111 FL (ref 82–98)
MONOCYTES # BLD AUTO: 0.37 THOUSAND/UL (ref 0–1.22)
MONOCYTES NFR BLD: 2 % (ref 4–12)
NEUTROPHILS # BLD MANUAL: 15.4 THOUSAND/UL (ref 1.85–7.62)
NEUTS BAND NFR BLD MANUAL: 6 % (ref 0–8)
NEUTS SEG NFR BLD AUTO: 77 % (ref 43–75)
NRBC BLD AUTO-RTO: 14 /100 WBC (ref 0–2)
NRBC BLD AUTO-RTO: 18 /100 WBCS
PHOSPHATE SERPL-MCNC: 4.4 MG/DL (ref 2.3–4.1)
PLATELET # BLD AUTO: 747 THOUSANDS/UL (ref 149–390)
PLATELET BLD QL SMEAR: ABNORMAL
PMV BLD AUTO: 9.2 FL (ref 8.9–12.7)
POLYCHROMASIA BLD QL SMEAR: PRESENT
POTASSIUM SERPL-SCNC: 4.2 MMOL/L (ref 3.5–5.3)
PROT SERPL-MCNC: 6.1 G/DL (ref 6.4–8.2)
PROTHROMBIN TIME: 15 SECONDS (ref 12.1–14.4)
RBC # BLD AUTO: 2.08 MILLION/UL (ref 3.88–5.62)
SODIUM SERPL-SCNC: 142 MMOL/L (ref 136–145)
SPHEROCYTES BLD QL SMEAR: PRESENT
TOTAL CELLS COUNTED SPEC: 100
UNIT DISPENSE STATUS: NORMAL
UNIT DISPENSE STATUS: NORMAL
UNIT PRODUCT CODE: NORMAL
UNIT PRODUCT CODE: NORMAL
UNIT RH: NORMAL
UNIT RH: NORMAL
VARIANT LYMPHS # BLD AUTO: 1 %
WBC # BLD AUTO: 18.55 THOUSAND/UL (ref 4.31–10.16)

## 2017-06-07 PROCEDURE — 80053 COMPREHEN METABOLIC PANEL: CPT | Performed by: NURSE PRACTITIONER

## 2017-06-07 PROCEDURE — 85730 THROMBOPLASTIN TIME PARTIAL: CPT | Performed by: NURSE PRACTITIONER

## 2017-06-07 PROCEDURE — P9016 RBC LEUKOCYTES REDUCED: HCPCS

## 2017-06-07 PROCEDURE — 85027 COMPLETE CBC AUTOMATED: CPT | Performed by: NURSE PRACTITIONER

## 2017-06-07 PROCEDURE — A9270 NON-COVERED ITEM OR SERVICE: HCPCS | Performed by: NURSE PRACTITIONER

## 2017-06-07 PROCEDURE — 84100 ASSAY OF PHOSPHORUS: CPT | Performed by: NURSE PRACTITIONER

## 2017-06-07 PROCEDURE — 82330 ASSAY OF CALCIUM: CPT | Performed by: NURSE PRACTITIONER

## 2017-06-07 PROCEDURE — 93306 TTE W/DOPPLER COMPLETE: CPT

## 2017-06-07 PROCEDURE — 83735 ASSAY OF MAGNESIUM: CPT | Performed by: NURSE PRACTITIONER

## 2017-06-07 PROCEDURE — 83615 LACTATE (LD) (LDH) ENZYME: CPT | Performed by: NURSE PRACTITIONER

## 2017-06-07 PROCEDURE — 85007 BL SMEAR W/DIFF WBC COUNT: CPT | Performed by: NURSE PRACTITIONER

## 2017-06-07 PROCEDURE — 85610 PROTHROMBIN TIME: CPT | Performed by: NURSE PRACTITIONER

## 2017-06-07 RX ORDER — SIMETHICONE 80 MG
80 TABLET,CHEWABLE ORAL EVERY 6 HOURS PRN
Status: DISCONTINUED | OUTPATIENT
Start: 2017-06-07 | End: 2017-06-09 | Stop reason: HOSPADM

## 2017-06-07 RX ORDER — PREDNISONE 20 MG/1
80 TABLET ORAL DAILY
Status: DISCONTINUED | OUTPATIENT
Start: 2017-06-08 | End: 2017-06-09 | Stop reason: HOSPADM

## 2017-06-07 RX ADMIN — HEPARIN SODIUM 3200 UNITS: 1000 INJECTION, SOLUTION INTRAVENOUS; SUBCUTANEOUS at 02:36

## 2017-06-07 RX ADMIN — FERROUS SULFATE TAB 325 MG (65 MG ELEMENTAL FE) 325 MG: 325 (65 FE) TAB at 17:14

## 2017-06-07 RX ADMIN — PREDNISONE 10 MG: 10 TABLET ORAL at 08:03

## 2017-06-07 RX ADMIN — IRON SUCROSE 300 MG: 20 INJECTION, SOLUTION INTRAVENOUS at 11:42

## 2017-06-07 RX ADMIN — SIMETHICONE CHEW TAB 80 MG 80 MG: 80 TABLET ORAL at 13:23

## 2017-06-07 RX ADMIN — HEPARIN SODIUM AND DEXTROSE 20 UNITS/KG/HR: 10000; 5 INJECTION INTRAVENOUS at 11:39

## 2017-06-07 RX ADMIN — CYANOCOBALAMIN TAB 500 MCG 1000 MCG: 500 TAB at 08:03

## 2017-06-07 RX ADMIN — PANTOPRAZOLE SODIUM 40 MG: 40 TABLET, DELAYED RELEASE ORAL at 06:03

## 2017-06-07 RX ADMIN — FERROUS SULFATE TAB 325 MG (65 MG ELEMENTAL FE) 325 MG: 325 (65 FE) TAB at 08:03

## 2017-06-07 RX ADMIN — HEPARIN SODIUM 3200 UNITS: 1000 INJECTION, SOLUTION INTRAVENOUS; SUBCUTANEOUS at 11:51

## 2017-06-07 RX ADMIN — RIVAROXABAN 15 MG: 15 TABLET, FILM COATED ORAL at 17:14

## 2017-06-07 NOTE — CASE MANAGEMENT
Initial Clinical Review    Admission: Date/Time/Statement: 6/6/17 @ 2010     Orders Placed This Encounter   Procedures    Inpatient Admission (expected length of stay for this patient is greater than two midnights)     Standing Status:   Standing     Number of Occurrences:   1     Order Specific Question:   Admitting Physician     Answer:   Reji Mcdonald [[id number]]     Order Specific Question:   Level of Care     Answer:   Level 1 Stepdown [13]     Order Specific Question:   Estimated length of stay     Answer:   More than 2 Midnights     Order Specific Question:   Certification     Answer:   I certify that inpatient services are medically necessary for this patient for a duration of greater than two midnights  See H&P and MD Progress Notes for additional information about the patient's course of treatment  ED: Date/Time/Mode of Arrival:   ED Arrival Information     Expected Arrival Acuity Means of Arrival Escorted By Service Admission Type    - 6/6/2017 15:01 Urgent Walk-In Self Critical Care/ICU Urgent    Arrival Complaint    COUGH,SOB          Chief Complaint:   Chief Complaint   Patient presents with    Shortness of Breath     Patient presents complaining of cough and shortness of breath, worsened by exertion  Shares that he had his spleen removed on May 18th, and had CT earlier this morning which showed abnormality  Patient unsure if it was PE  Also complaining of some tingling in left lower extremity, for which he had "ultrasound earlier today"  History of Illness:   Rickey Alexander is a 60 y o  male who presents with a complaint of fatigue and shortness of breath  The patient underwent a splenectomy on May 18, 2017 for an enlarged spleen associated with his known history of warm antibody hemolytic anemia  He had a relatively uncomplicated hospital course  His hemoglobin was noted to be on the rise and his prednisone dosing was being tapered   Over the last several days the patient has noted increasing fatigue and shortness of breath  He also noted increasing swelling in his right calf  He underwent imaging of the right calf which did not reveal deep vein thrombosis however imaging of the chest did reveal several fourth segment peripheral pulmonary emboli  Currently the patient denies any chest pain or increasing shortness of breath  He denies nausea, vomiting, diarrhea, or constipation  He denies fever or chills       ED Vital Signs:   ED Triage Vitals   Temperature Pulse Respirations Blood Pressure SpO2   06/06/17 1509 06/06/17 1509 06/06/17 1509 06/06/17 1509 06/06/17 1509   98 5 °F (36 9 °C) 95 20 143/64 99 %      Temp Source Heart Rate Source Patient Position BP Location FiO2 (%)   06/06/17 1509 06/06/17 1509 06/06/17 1509 06/06/17 1509 --   Temporal Monitor Sitting Right arm       Pain Score       06/06/17 1509       No Pain        Wt Readings from Last 1 Encounters:   06/07/17 83 2 kg (183 lb 6 8 oz)       Vital Signs (abnormal):    above    Abnormal Labs/Diagnostic Test Results:    Ct abd:   Post splenectomy  2   No intra-abdominal hemorrhage  3   Intrahepatic portal venous thrombosis  WBC   16 01  H/H  5 5/18 9  RBC  1 50  Platelets   427  BNP   488    H/H  7 3/23  ( 6/7)    ED Treatment:   Medication Administration from 06/06/2017 1501 to 06/06/2017 2106       Date/Time Order Dose Route Action Action by Comments     06/06/2017 1648 aspirin chewable tablet 324 mg 324 mg Oral Given Kirsten Arndt RN      06/06/2017 1823 predniSONE tablet 60 mg 60 mg Oral Given Aida Blum RN      06/06/2017 1912 iodixanol (VISIPAQUE) 320 MG/ML injection 100 mL 100 mL Intravenous Given Margaret Enriquez      06/06/2017 2015 heparin (porcine) injection 6,400 Units 6,400 Units Intravenous Given Lyric Trujillo RN      06/06/2017 2018 heparin (porcine) 25,000 units in 250 mL infusion (premix) 18 Units/kg/hr Intravenous New Bag Lyric Trujillo RN           Past Medical/Surgical History:    Active Ambulatory Problems     Diagnosis Date Noted    Tobacco abuse     Palpitation     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    Jaundice 11/02/2016    Hyperbilirubinemia 11/02/2016    Hemolytic anemia     Symptomatic anemia 02/03/2017     Resolved Ambulatory Problems     Diagnosis Date Noted    No Resolved Ambulatory Problems     Past Medical History:   Diagnosis Date    Hemolytic anemia     Palpitation     Tobacco abuse        Admitting Diagnosis: Portal vein thrombosis [I81]  Pulmonary embolism [I26 99]  SOB (shortness of breath) [R06 02]  Hemolytic anemia [D58 9]  Acute pulmonary embolism [I26 99]  Acute hemolytic anemia [D59 9]    Age/Sex: 60 y o  male    1  Assessment/Plan:  Multiple peripheral pulmonary embolism  · Given the patient's comorbid condition of hemolytic anemia and recent splenectomy he will be admitted to the stepdown unit for evaluation and monitoring  This will likely require greater than a 2 midnight stay therefore the patient will be placed in inpatient status  · The patient was started on a heparin infusion  We will continue this with a goal to have his PTT level 1 5-2 times the normal limit  · We will consult hematology for assistance with the care of this patient given his history of warm antibody hemolytic anemia  · We'll obtain a echocardiogram to evaluate for right heart strain  2  Acute on chronic anemia likely related to hemolysis and recent surgery  ¨ The patient will receive 2 units of packed red blood cells  ¨ We will repeat his CBC in the morning and monitor his hemoglobin for resolution to baseline  3  Warm antibody hemolytic anemia  ¨ A hematology consult is pending  He was previously on prednisone however this dose was tapered after his surgical procedure when his hemoglobin was noted to be rising  He was loaded with 60 mg of prednisone per hematology's recommendations  ¨ We will continue him on daily prednisone, the dose to be determined by hematology    4  Status post splenectomy  ¨ He has evidence of post splenectomy thrombocytosis  This is currently stable  5  Portal vein thrombosis  The main portal vein is patent and the patient will be receiving anticoagulation for his pulmonary embolism      Admission Orders:    IP  6/6  @   2010  Scheduled Meds:   cyanocobalamin 1,000 mcg Oral Daily   docusate sodium 100 mg Oral BID   ferrous sulfate 325 mg Oral BID   iron sucrose 300 mg Intravenous Once   pantoprazole 40 mg Oral Early Morning   predniSONE 10 mg Oral Daily     Continuous Infusions:   heparin (porcine) 3-30 Units/kg/hr (Order-Specific) Last Rate: 20 Units/kg/hr (06/07/17 0600)     PRN Meds: heparin (porcine)    heparin (porcine)    Reg  Diet  2 DE  2 U PRBC

## 2017-06-07 NOTE — H&P
History & Physical Exam - Critical Care   Angelica Duque 60 y o  male MRN: [id number]  Unit/Bed#: ED 30 Encounter: [id number]      Assessment/Plan:  1  Multiple peripheral pulmonary embolism  · Given the patient's comorbid condition of hemolytic anemia and recent splenectomy he will be admitted to the stepdown unit for evaluation and monitoring  This will likely require greater than a 2 midnight stay therefore the patient will be placed in inpatient status  · The patient was started on a heparin infusion  We will continue this with a goal to have his PTT level 1 5-2 times the normal limit  · We will consult hematology for assistance with the care of this patient given his history of warm antibody hemolytic anemia  · We'll obtain a echocardiogram to evaluate for right heart strain  2  Acute on chronic anemia likely related to hemolysis and recent surgery  · The patient will receive 2 units of packed red blood cells  · We will repeat his CBC in the morning and monitor his hemoglobin for resolution to baseline  3   Warm antibody hemolytic anemia  · A hematology consult is pending  He was previously on prednisone however this dose was tapered after his surgical procedure when his hemoglobin was noted to be rising  He was loaded with 60 mg of prednisone per hematology's recommendations  · We will continue him on daily prednisone, the dose to be determined by hematology  4   Status post splenectomy  · He has evidence of post splenectomy thrombocytosis  This is currently stable  5  Portal vein thrombosis  · The main portal vein is patent and the patient will be receiving anticoagulation for his pulmonary embolism  Critical Care Time: 38 minutes  Documented critical care time excludes any procedures documented elsewhere   It also excludes any family updates    _____________________________________________________________________      HPI:    Angelica Duque is a 60 y o  male who presents with a complaint of fatigue and shortness of breath  The patient underwent a splenectomy on May 18, 2017 for an enlarged spleen associated with his known history of warm antibody hemolytic anemia  He had a relatively uncomplicated hospital course  His hemoglobin was noted to be on the rise and his prednisone dosing was being tapered  Over the last several days the patient has noted increasing fatigue and shortness of breath  He also noted increasing swelling in his right calf  He underwent imaging of the right calf which did not reveal deep vein thrombosis however imaging of the chest did reveal several fourth segment peripheral pulmonary emboli  Currently the patient denies any chest pain or increasing shortness of breath  He denies nausea, vomiting, diarrhea, or constipation  He denies fever or chills       Review of Systems:  Review of Systems   Constitutional: Positive for fatigue  Negative for chills and fever  HENT: Negative for congestion  Respiratory: Positive for cough and shortness of breath  Negative for choking, chest tightness, wheezing and stridor  Cardiovascular: Positive for leg swelling  Negative for chest pain  Gastrointestinal: Negative for abdominal distention, abdominal pain, anal bleeding and blood in stool  Genitourinary: Negative for dysuria  Full 14 point review of systems was performed  Aside from what was mentioned in the HPI, it is otherwise negative        Historical Information   Past Medical History:   Diagnosis Date    Hemolytic anemia     Palpitation     Tobacco abuse      Past Surgical History:   Procedure Laterality Date    KNEE SURGERY Right     AR REMOVAL SPLEEN, TOTAL N/A 5/18/2017    Procedure: LAPAROSCOPIC HAND ASSIST SPLENECTOMY;  Surgeon: Jaleel Bautista MD;  Location: [facility];  Service: Surgical Oncology    SHOULDER SURGERY Left      Social History   History   Alcohol Use    Yes     Comment: social     History   Drug Use No     History   Smoking Status    Former Smoker    Types: Cigars   Smokeless Tobacco    Not on file       Family History:   History reviewed  No pertinent family history  Medications:  Pertinent medications were reviewed         Allergies   Allergen Reactions    Iodinated Diagnostic Agents Hives         Vitals:   /70  Pulse 90  Temp 98 5 °F (36 9 °C) (Temporal)   Resp 18  Wt 83 9 kg (185 lb)  SpO2 98%  BMI 27 32 kg/m2  Body mass index is 27 32 kg/(m^2)  SpO2: 98 %,   SpO2 Activity: At Rest,   O2 Device: None (Room air)    No intake or output data in the 24 hours ending 06/06/17 2025  Invasive Devices     Peripheral Intravenous Line            Peripheral IV 06/06/17 Left Antecubital less than 1 day    Peripheral IV 06/06/17 Right Antecubital less than 1 day                Physical Exam:  Gen:awake and alert  HE: head is normocephalic, pupils are equal round and reactive to light  His sclera are mildly icteric  ENT:TMs are clear, nasal passageways , throat is without erythema  Neck/lymph:supple negative for lymphadenopathy  Chest: Essentially clear to auscultation  Cor:regular rate and rhythm  Abd: mildly distended but soft and nontender  Ext: there is mild edema in his bilateral lower extremities  Neuro:awake and alert, able to move all extremities without difficulty   Skin: warm and dry      Diagnostic Data:  Lab: I have personally reviewed pertinent lab results  ,   CBC:    Results from last 7 days  Lab Units 06/06/17  1636   WBC Thousand/uL 16 01*   HEMOGLOBIN g/dL 5 5*   HEMATOCRIT % 18 9*   PLATELETS Thousands/uL 742*      CMP: Lab Results   Component Value Date     06/06/2017    K 5 0 06/06/2017     06/06/2017    CO2 28 06/06/2017    ANIONGAP 9 06/06/2017    BUN 14 06/06/2017    CREATININE 0 97 06/06/2017    GLUCOSE 114 06/06/2017    CALCIUM 9 3 06/06/2017    AST 44 06/06/2017    ALT 43 06/06/2017    ALKPHOS 120 (H) 06/06/2017    PROT 7 1 06/06/2017    ALBUMIN 3 7 06/06/2017    BILITOT 2 97 (H) 06/06/2017    EGFR >60 0 06/06/2017   ,   PT/INR:   Lab Results   Component Value Date    INR 1 07 06/06/2017   ,   Troponin:   Lab Results   Component Value Date    TROPONINI <0 02 06/06/2017   ,   Magnesium: No components found for: MAG,  Phosphorous: No results found for: PHOS    ABG: No results found for: PHART, KGX1JOY, PO2ART, LQO7JUN, M4LGRIFZ, BEART, SOURCE,     Microbiology:      Imaging: I have personally reviewed the pertinent imaging studies on the PACS system  Ct of the abdomen and pelvis reveals:  1  Post splenectomy  2  No intra-abdominal hemorrhage  3  Intrahepatic portal venous thrombosis        EKG/telemetry/Echo:         VTE Prophylaxis: Heparin    Code Status: Prior    Portions of the record may have been created with voice recognition software  Occasional wrong word or "sound a like" substitutions may have occurred due to the inherent limitations of voice recognition software  Read the chart carefully and recognize, using context, where substitutions have occurred

## 2017-06-07 NOTE — CONSULTS
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      Clinically, does not appear that patient has any underlying MPD/MDS  CBCD 11/2/2016:  Hemoglobin 5 7, , WBC 3 34 with 57% neutrophils, 29% lymphocytes, 7% monocytes  Platelet count 893  We discussed the possibility of Bone marrow aspiration and biopsy  He wishes to defer at this time  TTP/HUS much less likely secondary to lack of fever, renal dysfunction, neuro symptoms, lack of substantial platelet decrease  With portal vein thrombosis, PNH is a concern  Bilrubin elevated, nRBC seen  Will check PNH panel, CEE, smear for schistocytes, lfts, retic, epo, cold agglutinin, G6PD  D/C on anticoagulation 2nd to clots  He was instructed to have CBCD Monday and call the office  Will have him take prednisone 1mg/kg/day  He will see Dr Nicolasa Rios to discuss outpatient within 2 weeks  Presenting Complaint: SOB  History of Presenting Illness:  Chiptori Rojas  'Weior Arjun' is well known to our practice  He was admitted to [facility]  6/6/2017 with cough, SOB, LOWE  He had splenectomy 5/18/2017 by  Dr Carrie Horton  Seen in his office 6/5/2017  CTA performed outpatient which identified:  Several small peripheral pulmonary emboli identified  No central pulmonary emboli  BLE venous Doppler negative for DVT  CT A/P 6/7/17:  : Post splenectomy  No intra-abdominal hemorrhage  Intrahepatic portal venous thrombosis      His hemoglobin 5/22/17:  7 9, platelet count 831; hemoglobin  8 7 platelet count  707 4/90/9168; hemoglobin 8 3 platelet count 441; hemoglobin 7 platelet count 115 6/1/2017 6/6/17 230pm:  hemoglobin: 5 9, platelet count 1648;  6/6/17 436pm:  H/H 5 5/18 9, ; platelet count 777;  6/7/17 0415:  Hemoglobin 7 3, ; platelet count 630;  WBC 18 55, 77% neutrophils, 6% bands, 14% lymphocytes, 2 % monocytes, 1% atypical lymphs  14nrbc  Haptoglobin normal 6/1/2017 89  Folate >20;  MMA normal at 127  No monoclonal bands on SPEP 6/3/2017  Ferritin 1579 6/6/17 (suspected acute phase reactant); iron 54; TIBC 356  saturation:  15%    Fibrinogen 586 6/6/17;  fibrin split products >20 <40    Total bili elevated to 2 97; alk phos 120;  cr 0 97  LDH chronically elevated:  491 6/7/2017; 449 5/20/2017; 479 5/19/2017;  548 11-3-2016  He was given prednisone 60mg x 1 6/6/2017  Heparin 6400 units x 1 6/6/2017  2000; Xarelto 15mg po bid started today after intraperitoneal bleed ruled out;  [id number] x 1 dose given and Prednisone 10mg po daily started  PTT elevated  44 6/7/17 1034  PT/INR 15/ I 17 6/7/17 0415  No elevated temperature  No underlying rheumatologic condition  No prior history of clot  As Per 5/25/2017 office note follow up with Dr Marci Newsome:      10/31/16, patient's family noted jaundice    Patient reported that he had been feeling poorly since mid September  He was starting to feel weak, was experiencing LOWE, his legs were heavy  In [place], he states there is known water contamination and there have been instances of food contamination  Air conditioning units have been faulty in the past    Admitted to [facility] 11/2  11/4/16 secondary to fatigue, SOB, yellowed skin  His hemoglobin was 5 7; after 1 L fluid, it decreased to 4 6 g/dL  His total bilrubin was 5 38; otherwise normal LFTs  HSM noted; spleen 16 2cm; no evidence of lymphoma; no obstructive pathology  Prior to transfusion PRBCS, he had CEE, peripheral smear, LDH    CEE was +3 positive for IgG  Vitamin B12 904  HIV negative    WBCs 2 5, , platelet count 433, 66 neutrophils, 22 lymphocytes, 4 monos, 7 eosinophils, one nucleated red cell  For his autoimmune hemolytic anemia, he was started on prednisone 1mg/kg/day  He was discharged home on prednisone 20mg (2) po bid  11/16/16: His hemoglobin was 12 3, ; WBC 10 8, platelets 372  LDH 11/3/16 was 548  Tapered over following weeks and DC'd altogether with last dose January 6, 2016 February/March 2017Rituxan 375 mg/m² every week ×4   5/18/17- underwent splenectomy  Open  Hosp for 5 days  Hypokalemia, anemia down to 7 6, hypertension  All improved and was DC'd home  Spleen showed no pathologic abnormalities  He was advised to  [facility] his prednisone from current 10 mg by mouth daily to 10 mg by mouth every other day for one month and then discontinue altogether  CBC on a every 2 week basis      Review of Systems - As stated in the HPI otherwise the fourteen point review of systems was negative  Past Medical History:   Diagnosis Date    Hemolytic anemia     Palpitation     Tobacco abuse        Social History     Social History    Marital status: /Civil Union     Spouse name: N/A    Number of children: N/A    Years of education: N/A     Social History Main Topics    Smoking status: Current Some Day Smoker     Types: Cigars    Smokeless tobacco: None      Comment: socially    Alcohol use Yes      Comment: social    Drug use: No    Sexual activity: Not Asked     Other Topics Concern    None     Social History Narrative    None       History reviewed  No pertinent family history      Allergies   Allergen Reactions    Iodinated Diagnostic Agents Hives         Current Facility-Administered Medications:     cyanocobalamin (VITAMIN B-12) tablet 1,000 mcg, 1,000 mcg, Oral, Daily, Rita Bun, CRNP, 1,000 mcg at 06/07/17 0803    docusate sodium (COLACE) capsule 100 mg, 100 mg, Oral, BID, [place] Bun, CRNP    ferrous sulfate tablet 325 mg, 325 mg, Oral, BID, [place] Bun, CRNP, 325 mg at 06/07/17 9860   pantoprazole (PROTONIX) EC tablet 40 mg, 40 mg, Oral, Early Morning, Austyn Nguyen, CRNP, 40 mg at 06/07/17 0603    predniSONE tablet 10 mg, 10 mg, Oral, Daily, Morene Cue, CRNP, 10 mg at 06/07/17 0803    rivaroxaban (XARELTO) tablet 15 mg, 15 mg, Oral, BID With Meals, VISHNU Nielsen    [facility]) chewable tablet 80 mg, 80 mg, Oral, Q6H PRN, VISHNU Nielsen, 80 mg at 06/07/17 1323      /79  Pulse 76  Temp 97 6 °F (36 4 °C)  Resp 14  Ht 5' 9" (1 753 m)  Wt 83 2 kg (183 lb 6 8 oz)  SpO2 99%  BMI 27 09 kg/m2    General Appearance:    Alert, oriented        Eyes:    PER   Ears:    Normal external ear canals, both ears   Nose:   Nares normal, septum midline   Throat:   Mucosa moist  Pharynx without injection  Neck:   Supple       Lungs:     Clear to auscultation bilaterally   Chest Wall:    No tenderness or deformity    Heart:    Regular rate and rhythm       Abdomen:     Soft, non-tender, bowel sounds +, no organomegaly; incision healed           Extremities:   Extremities no cyanosis or edema       Skin:   no rash + icterus      Lymph nodes:   Cervical, supraclavicular, and axillary nodes normal   Neurologic:   CNII-XII intact, normal strength, sensation and reflexes     Throughout               Recent Results (from the past 48 hour(s))   CBC and differential    Collection Time: 06/06/17  2:30 PM   Result Value Ref Range    WBC 15 59 (H) 4 31 - 10 16 Thousand/uL    RBC 1 49 (L) 3 88 - 5 62 Million/uL    Hemoglobin 5 9 (LL) 12 0 - 17 0 g/dL    Hematocrit 20 0 (L) 36 5 - 49 3 %     (H) 82 - 98 fL    MCH 39 6 (H) 26 8 - 34 3 pg    MCHC 29 5 (L) 31 4 - 37 4 g/dL    RDW 23 2 (H) 11 6 - 15 1 %    MPV 10 0 8 9 - 12 7 fL    Platelets 9122 (HH) 428 - 390 Thousands/uL   Iron    Collection Time: 06/06/17  2:30 PM   Result Value Ref Range    Iron 54 (L) 65 - 175 ug/dL   TIBC    Collection Time: 06/06/17  2:30 PM   Result Value Ref Range    TIBC 356 250 - 450 ug/dL   Ferritin Collection Time: 06/06/17  2:30 PM   Result Value Ref Range    Ferritin 1579 (H) 8 - 388 ng/mL   Manual Differential(PHLEBS Do Not Order)    Collection Time: 06/06/17  2:30 PM   Result Value Ref Range    Segmented % 73 43 - 75 %    Bands % 9 (H) 0 - 8 %    Lymphocytes % 15 14 - 44 %    Monocytes % 3 (L) 4 - 12 %    Eosinophils % 0 0 - 6 %    Basophils % 0 0 - 1 %    Absolute Neutrophils 12 78 (H) 1 85 - 7 62 Thousand/uL    Lymphocytes Absolute 2 34 0 60 - 4 47 Thousand/uL    Monocytes Absolute 0 47 0 00 - 1 22 Thousand/uL    Eosinophils Absolute 0 00 0 00 - 0 40 Thousand/uL    Basophils Absolute 0 00 0 00 - 0 10 Thousand/uL    Total Counted 100     nRBC 35 (H) 0 - 2 /100 WBC    Anisocytosis Present     Macrocytes Present     Polychromasia Present     Spherocytes Present     Platelet Estimate Increased (A) Adequate    Giant PLTs Present     Large Platelet Present    ECG 12 lead    Collection Time: 06/06/17  3:18 PM   Result Value Ref Range    Ventricular Rate 93 BPM    Atrial Rate 93 BPM    MN Interval 144 ms    QRSD Interval 82 ms    QT Interval 354 ms    QTC Interval 440 ms    P [place] 55 degrees    QRS Axis 57 degrees    T Wave Axis 26 degrees   Comprehensive metabolic panel    Collection Time: 06/06/17  4:36 PM   Result Value Ref Range    Sodium 142 136 - 145 mmol/L    Potassium 5 0 3 5 - 5 3 mmol/L    Chloride 105 100 - 108 mmol/L    CO2 28 21 - 32 mmol/L    Anion Gap 9 4 - 13 mmol/L    BUN 14 5 - 25 mg/dL    Creatinine 0 97 0 60 - 1 30 mg/dL    Glucose 114 65 - 140 mg/dL    Calcium 9 3 8 3 - 10 1 mg/dL    AST 44 5 - 45 U/L    ALT 43 12 - 78 U/L    Alkaline Phosphatase 120 (H) 46 - 116 U/L    Total Protein 7 1 6 4 - 8 2 g/dL    Albumin 3 7 3 5 - 5 0 g/dL    Total Bilirubin 2 97 (H) 0 20 - 1 00 mg/dL    eGFR >60 0 ml/min/1 73sq m   B-type natriuretic peptide    Collection Time: 06/06/17  4:36 PM   Result Value Ref Range    NT-proBNP 488 (H) <125 pg/mL   Troponin I    Collection Time: 06/06/17  4:36 PM   Result Value Ref Range    Troponin I <0 02 <=0 04 ng/mL   CBC and differential    Collection Time: 06/06/17  4:36 PM   Result Value Ref Range    WBC 16 01 (H) 4 31 - 10 16 Thousand/uL    RBC 1 50 (L) 3 88 - 5 62 Million/uL    Hemoglobin 5 5 (LL) 12 0 - 17 0 g/dL    Hematocrit 18 9 (L) 36 5 - 49 3 %     (H) 82 - 98 fL    MCH 36 7 (H) 26 8 - 34 3 pg    MCHC 29 1 (L) 31 4 - 37 4 g/dL    RDW 22 6 (H) 11 6 - 15 1 %    MPV 9 9 8 9 - 12 7 fL    Platelets 378 (H) 638 - 390 Thousands/uL    nRBC 32 /100 WBCs   Protime-INR    Collection Time: 06/06/17  4:36 PM   Result Value Ref Range    Protime 13 9 12 1 - 14 4 seconds    INR 1 07 0 86 - 1 16   APTT    Collection Time: 06/06/17  4:36 PM   Result Value Ref Range    PTT 18 (L) 23 - 35 seconds   Manual Differential(PHLEBS Do Not Order)    Collection Time: 06/06/17  4:36 PM   Result Value Ref Range    Segmented % 63 43 - 75 %    Bands % 12 (H) 0 - 8 %    Lymphocytes % 18 14 - 44 %    Monocytes % 6 4 - 12 %    Eosinophils % 0 0 - 6 %    Basophils % 1 0 - 1 %    Absolute Neutrophils 12 01 (H) 1 85 - 7 62 Thousand/uL    Lymphocytes Absolute 2 88 0 60 - 4 47 Thousand/uL    Monocytes Absolute 0 96 0 00 - 1 22 Thousand/uL    Eosinophils Absolute 0 00 0 00 - 0 40 Thousand/uL    Basophils Absolute 0 16 (H) 0 00 - 0 10 Thousand/uL    Total Counted 100     nRBC 32 (H) 0 - 2 /100 WBC    Anisocytosis Present     Macrocytes Present     Polychromasia Present     Spherocytes Present     Platelet Estimate Increased (A) Adequate    Giant PLTs Present     Large Platelet Present    Hepatic function panel    Collection Time: 06/06/17  4:36 PM   Result Value Ref Range    Total Bilirubin 3 14 (H) 0 20 - 1 00 mg/dL    Bilirubin, Direct 0 49 (H) 0 00 - 0 20 mg/dL    Alkaline Phosphatase 120 (H) 46 - 116 U/L    AST 44 5 - 45 U/L    ALT 43 12 - 78 U/L    Total Protein 7 0 6 4 - 8 2 g/dL    Albumin 3 7 3 5 - 5 0 g/dL   Type and screen    Collection Time: 06/06/17  5:40 PM   Result Value Ref Range    ABO Grouping A     Rh Factor Positive     Antibody Screen Positive     Specimen Expiration Date [id number]    Fibrin split products    Collection Time: 06/06/17  6:24 PM   Result Value Ref Range    FDP >20 <40 (A) <10, >40 <80   Fibrinogen    Collection Time: 06/06/17  6:24 PM   Result Value Ref Range    Fibrinogen 586 (H) 227 - 495 mg/dL   APTT    Collection Time: 06/07/17  2:02 AM   Result Value Ref Range    PTT 55 (H) 23 - 35 seconds   CBC with differential    Collection Time: 06/07/17  4:15 AM   Result Value Ref Range    WBC 18 55 (H) 4 31 - 10 16 Thousand/uL    RBC 2 08 (L) 3 88 - 5 62 Million/uL    Hemoglobin 7 3 (L) 12 0 - 17 0 g/dL    Hematocrit 23 1 (L) 36 5 - 49 3 %     (H) 82 - 98 fL    MCH 35 1 (H) 26 8 - 34 3 pg    MCHC 31 6 31 4 - 37 4 g/dL    RDW 26 0 (H) 11 6 - 15 1 %    MPV 9 2 8 9 - 12 7 fL    Platelets 597 (H) 391 - 390 Thousands/uL    nRBC 18 /100 WBCs   Comprehensive metabolic panel    Collection Time: 06/07/17  4:15 AM   Result Value Ref Range    Sodium 142 136 - 145 mmol/L    Potassium 4 2 3 5 - 5 3 mmol/L    Chloride 106 100 - 108 mmol/L    CO2 26 21 - 32 mmol/L    Anion Gap 10 4 - 13 mmol/L    BUN 16 5 - 25 mg/dL    Creatinine 0 99 0 60 - 1 30 mg/dL    Glucose 164 (H) 65 - 140 mg/dL    Calcium 8 8 8 3 - 10 1 mg/dL    AST 19 5 - 45 U/L    ALT 35 12 - 78 U/L    Alkaline Phosphatase 105 46 - 116 U/L    Total Protein 6 1 (L) 6 4 - 8 2 g/dL    Albumin 3 2 (L) 3 5 - 5 0 g/dL    Total Bilirubin 2 20 (H) 0 20 - 1 00 mg/dL    eGFR >60 0 ml/min/1 73sq m   Magnesium    Collection Time: 06/07/17  4:15 AM   Result Value Ref Range    Magnesium 2 2 1 6 - 2 6 mg/dL   Phosphorus    Collection Time: 06/07/17  4:15 AM   Result Value Ref Range    Phosphorus 4 4 (H) 2 3 - 4 1 mg/dL   Calcium, ionized    Collection Time: 06/07/17  4:15 AM   Result Value Ref Range    Calcium, Ionized 1 14 1 12 - 1 32 mmol/L   Protime-INR    Collection Time: 06/07/17  4:15 AM   Result Value Ref Range    Protime 15 0 (H) 12 1 - 14 4 seconds    INR 1 17 (H) 0 86 - 1 16   Manual Differential(PHLEBS Do Not Order)    Collection Time: 06/07/17  4:15 AM   Result Value Ref Range    Segmented % 77 (H) 43 - 75 %    Bands % 6 0 - 8 %    Lymphocytes % 14 14 - 44 %    Monocytes % 2 (L) 4 - 12 %    Eosinophils % 0 0 - 6 %    Basophils % 0 0 - 1 %    Atypical Lymphocytes % 1 (H) <=0 %    Absolute Neutrophils 15 40 (H) 1 85 - 7 62 Thousand/uL    Lymphocytes Absolute 2 60 0 60 - 4 47 Thousand/uL    Monocytes Absolute 0 37 0 00 - 1 22 Thousand/uL    Eosinophils Absolute 0 00 0 00 - 0 40 Thousand/uL    Basophils Absolute 0 00 0 00 - 0 10 Thousand/uL    Total Counted 100     nRBC 14 (H) 0 - 2 /100 WBC    Anisocytosis Present     [facility] Bodies Present     Macrocytes Present     Polychromasia Present     Spherocytes Present     Platelet Estimate Increased (A) Adequate    Large Platelet Present    Prepare RBC:Transfusion Indications: Symptomatic anemia Hgb less than 7g/dL or Hct less than 21%; Has consent been obtained? Yes, 2 Units    Collection Time: 06/07/17  5:30 AM   Result Value Ref Range    Unit Product Code T1021P91     Unit Number [id number]     Unit ABO A     Unit [facility] POS     Crossmatch Compatible     Unit Dispense Status Presumed Trans     Unit Product Code A1682O50     Unit Number [id number]     Unit ABO A     Unit [facility] POS     Crossmatch Compatible     Unit Dispense Status Presumed Trans    APTT    Collection Time: 06/07/17 10:34 AM   Result Value Ref Range    PTT 44 (H) 23 - 35 seconds   LD,Blood    Collection Time: 06/07/17 11:55 AM   Result Value Ref Range     (H) 81 - 234 U/L         Xr Chest Portable    Result Date: 5/18/2017  Narrative: CHEST INDICATION:  Postop  COMPARISON:  2/3/2017 VIEWS:   AP frontal IMAGES:  1 FINDINGS:  Study is slightly limited by suboptimal inspiration  Cardiomediastinal silhouette appears unremarkable  The lungs are grossly clear  No pneumothorax or pleural effusion   Visualized osseous structures appear within normal limits for the patient's age  Surgical drain projecting over the left upper quadrant noted  Impression: Slightly limited study  No acute pulmonary disease  Workstation performed: [id number]     Ir Visceral Angiography    Result Date: 5/17/2017  Narrative: Procedure: 1  Selective mesenteric arteriogram  2   Splenic arterial embolization  History: 76-year-old male patient with hemolytic anemia and splenomegaly  He is preoperative for laparoscopic splenectomy tomorrow  Preoperative splenic arterial embolization has been requested to minimize blood loss during the planned splenectomy  Comments: The patient was identified  The procedure, risks, benefits and alternatives were explained to the patient who expressed understanding and patient signed an informed consent  Prior to the procedure, the case was discussed with the referring surgeon, Dr Jojo Rodriguez  Based on surgical needs, Dr Lucinda Parra requested embolization of major branches of the splenic artery as well as the main splenic artery  The patient was placed in the supine position  Real-time ultrasound imaging over the right groin showed patent femoral artery  A permanent ultrasound image was recorded  Maximum sterile barrier technique including cap, mask, gown and gloves, as well as a large sterile sheet was used  Appropriate hand hygiene was performed  The right groin was prepped using 2% chlorhexidine for cutaneous antisepsis and draped in the usual  sterile fashion  The ultrasound probe was introduced to the field in a sterile sleeve  Timeout verification, with all participants present, was performed prior to any intervention  1% lidocaine (8 mL) was used for local anesthesia  Under real-time ultrasound guidance, the right common femoral artery was accessed in retrograde fashion using a 21 gauge micropuncture needle  The needle tip was demonstrated within the right common femoral artery    Using standard needle, guidewire and catheter exchange techniques, a 5 [place] vascular sheath was placed  A 4 [place] Janice Contra catheter was advanced over a 0 035 inch J-tipped guidewire into the abdominal aorta and used to select the celiac axis  Celiac arteriogram was performed showing normal splenic artery anatomy  The splenic artery was selected using a 0 035-in  angled Glidewire  The Glidewire was advanced into splenic artery  The Contra catheter was exchanged via the Glidewire for a 4-[place] Glidecath which was advanced into a splenic artery branch  The Glidecath and 5-[place] vascular sheath were exchanged over a 0 035-in  tiny J-tipped Rose guidewire for a 6-[place] destination sheath  The tip of the sheath was positioned in the main splenic artery  Splenic arteriogram was performed to define the main branches  Three main branches were identified and each branch was selectively accessed using a 4-[place] Glidecath and Glidewire  Each branch was serially embolized to stasis using multiple tornado coils as follows: 5 mm x 3 cm tornado (3), 8 mm x 5 cm tornado (3), 7 mm x 3 cm tornado (5), 6 mm x 3 cm tornado (4), 4 mm x 3 cm tornado (3)  Repeat splenic arteriogram was performed to identify pancreatic branches  A 10 mm Amplatzer plug was positioned distal to the identified pancreatic branches and deployed  The vascular sheath was removed  Hemostasis was obtained using a Mynx Ace vascular closure device  The patient tolerated the procedure well  He was hemodynamically stable throughout the procedure  Fluoroscopy time: 30 minutes  Image count: Multiple  Contrast: 84 mL of Visipaque 320  Estimated blood loss: Less than 3 mL  VTe prophylaxis: Short duration procedure not requiring VTe prophylaxis  Complications: None  Medications: IV moderate sedation was utilized for 2 hours and 54 minutes   The patient's cardiorespiratory status was monitored before, during and after the procedure by interventional radiology nursing staff, under my direct supervision  Impression: Impression: 1  Uncomplicated preoperative coil embolization of 3 major branches of the splenic artery  2   Uncomplicated preoperative Amplatzer plug embolization of the main splenic artery, distal to identified pancreatic branches  Workstation performed: [id number]     Cta Chest Pe Study    Result Date: 6/6/2017  Narrative: CTA - CHEST WITH IV CONTRAST - PULMONARY ANGIOGRAM INDICATION: Chest pain  Shortness of breath  COMPARISON: None  TECHNIQUE: CTA examination of the chest was performed using angiographic technique according to a protocol specifically tailored to evaluate for pulmonary embolism  Reformatted images were created in axial, sagittal, and coronal planes  In addition, coronal 3D MIP postprocessing was performed on the acquisition scanner  Radiation dose length product (DLP) for this visit:  403 mGy-cm   This examination, like all CT scans performed in the [facility], was performed utilizing techniques to minimize radiation dose exposure, including the use of iterative reconstruction and automated exposure control  IV Contrast:  85 mL of iodixanol (VISIPAQUE)  320  FINDINGS: PULMONARY ARTERIAL TREE:  No central pulmonary emboli  There are small filling defects observed in the peripheral 4th order branch arteries supplying the right upper lobe, right middle lobe, and right lower lobe  Small filling defects observed in the peripheral 4th order branch artery supplying the left upper lobe No evidence of right ventricular strain  LUNGS:  Lungs are clear  There is no tracheal or endobronchial lesion  PLEURA:  Unremarkable  HEART/AORTA:  Cardiac size within normal limits  No pericardial effusion  MEDIASTINUM AND BRENDA:  Unremarkable  CHEST WALL AND LOWER NECK:  No discrete chest wall mass  VISUALIZED STRUCTURES IN THE UPPER ABDOMEN:  Numerous surgical clips in the left upper abdomen  Prior splenectomy   OSSEOUS STRUCTURES:  Stable     Impression: Several small peripheral pulmonary emboli identified  No central pulmonary emboli  ##cfslh I personally discussed this result with Kevin Valencia RN on 6/6/2017 2:39 PM  ##    Workstation performed: [id number]     Cta Chest Pe Study    Result Date: 5/19/2017  Narrative: CTA - CHEST WITH IV CONTRAST - PULMONARY ANGIOGRAM INDICATION: Chest pain  Shortness of breath  COMPARISON: 11/2/2016 TECHNIQUE: CTA examination of the chest was performed using angiographic technique according to a protocol specifically tailored to evaluate for pulmonary embolism  Reformatted images were created in axial, sagittal, and coronal planes  In addition, coronal 3D MIP postprocessing was performed on the acquisition scanner  Radiation dose length product (DLP) for this visit:  668 2 mGy-cm   This examination, like all CT scans performed in the [facility], was performed utilizing techniques to minimize radiation dose exposure, including the use of iterative reconstruction and automated exposure control  IV Contrast:  100 mL of iodixanol (VISIPAQUE)  Because the bolus on the initial scan was suboptimal, the study was repeated with 85 mL Visipaque  FINDINGS: PULMONARY ARTERIAL TREE:  Evaluation is slightly limited by respiratory motion artifact  No definite pulmonary embolus is seen  LUNGS:  Mild bilateral lower lobe consolidation, and minimal consolidation in the lingula compatible with atelectasis  There is no tracheal or endobronchial lesion  PLEURA:  Unremarkable  HEART/AORTA:  Normal heart size  Coronary artery calcifications noted  Thoracic aorta within normal limits  MEDIASTINUM AND BRENDA:  Unremarkable  CHEST WALL AND LOWER NECK:  Unremarkable  VISUALIZED STRUCTURES IN THE UPPER ABDOMEN:  Status post splenectomy, with a surgical training identified in the left upper quadrant  There are no fluid collections  OSSEOUS STRUCTURES:  No acute fracture or destructive osseous lesion       Impression: Slightly limited study due to respiratory motion artifact  No definite pulmonary emboli  Subsegmental atelectasis in the bilateral lower lobes and lingula  Workstation performed: [id number]     Vas Lower Limb Venous Duplex Study, Complete Bilateral    Result Date: 6/6/2017  Narrative:  [facility] REPORT CLINICAL: Indications: Bilateral Limb Pain [M79 [id number]]  Bilateral calf pain, right greater than left, chest pain and shortness of breath  Patient is s/p Splenectomy on 5/18/2017  Risk Factors: The patient has history of post operative state  Clinical: Left Lower Limb There is complaint of pain  Right Lower Limb There is complaint of pain  FINDINGS:  Segment  Right            Left              Impression       Impression       CFV      Normal (Patent)  Normal (Patent)     CONCLUSION:  Impression: RIGHT LOWER LIMB: No evidence of acute or chronic deep vein thrombosis  No evidence of superficial thrombophlebitis noted  Doppler evaluation shows a normal response to augmentation maneuvers  Popliteal, posterior tibial and anterior tibial arterial Doppler waveforms are triphasic  LEFT LOWER LIMB: No evidence of acute or chronic deep vein thrombosis  No evidence of superficial thrombophlebitis noted  Doppler evaluation shows a normal response to augmentation maneuvers  Popliteal, posterior tibial and anterior tibial arterial Doppler waveforms are triphasic  Tech note: Preliminary report given to Michael Aggarwal at Dr Rosa Elena Harris office  AB  SIGNATURE: Electronically Signed by: Raghav Springer on 2017-06-06 09:56:19 PM    Ct Abdomen Pelvis With Contrast    Result Date: 6/6/2017  Narrative: CT ABDOMEN AND PELVIS WITH IV CONTRAST INDICATION:  41-year-old man with history of autoimmune hemolytic anemia, 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 (with preoperative splenic artery embolization)  Low hemoglobin  Evaluate for intra-abdominal bleeding  COMPARISON: Preoperative CT from May 4, 2017  CTA of the chest from earlier today   TECHNIQUE:  CT examination of the abdomen and pelvis was performed  Reformatted images were created in axial, sagittal, and coronal planes  Radiation dose length product (DLP) for this visit:  426 mGy-cm   This examination, like all CT scans performed in the [facility], was performed utilizing techniques to minimize radiation dose exposure, including the use of iterative reconstruction and automated exposure control  IV Contrast:  100 mL of iodixanol (VISIPAQUE)     Enteric Contrast:  Enteric contrast was not administered  FINDINGS: ABDOMEN LOWER CHEST:  No significant abnormalities identified in the lower chest  LIVER/BILIARY TREE:  Unremarkable  GALLBLADDER:  Contracted  Otherwise normal  SPLEEN:  Post splenectomy  Embolic coils in the splenic artery  PANCREAS:  Portions of the pancreatic tail obscured by artifact from adjacent embolic coils  Pancreas otherwise normal  ADRENAL GLANDS:  Unremarkable  KIDNEYS/URETERS:  Unremarkable  No hydronephrosis  Contrast in the ureters and bladder from CTA of the chest performed earlier in the day  STOMACH AND BOWEL:  Stomach and small intestine normal in appearance  Colonic diverticulosis without evidence of diverticulitis  APPENDIX:  Normal  ABDOMINOPELVIC CAVITY: No lymphadenopathy or mass  No ascites  No hemoperitoneum or other intra-abdominal collection  No extraluminal gas  VESSELS:  Filling defects in the left and right portal veins, indicative of portal venous thrombosis  Main portal vein and SMV patent  PELVIS REPRODUCTIVE ORGANS:  Prostate and seminal vesicles unremarkable  URINARY BLADDER:  Unremarkable  ABDOMINAL WALL/INGUINAL REGIONS:  Unremarkable  OSSEOUS STRUCTURES:  No acute fracture or destructive osseous lesion  Impression: 1  Post splenectomy  2   No intra-abdominal hemorrhage  3   Intrahepatic portal venous thrombosis  4   I have discussed my findings with SHADY Kendrick   Workstation performed: [id number]

## 2017-06-07 NOTE — PROGRESS NOTES
Progress Note - Critical Care   Aurea Smith 60 y o  male MRN: [id number]  Unit/Bed#: ICU 02 Encounter: [id number]    Assessment/Plan:  1  Multiple peripheral pulmonary embolism  · Echocardiogram is pending for today  · Continue heparin infusion for now  · Awaiting hematology's evaluation to determine long-term anticoagulation goals given the patient's history of chronic hemolytic anemia  2  Acute on chronic anemia likely related to hemolysis secondary to warm body hemolytic condition  · Continue prednisone per hematology's recommendations  · His hemoglobin improved after the blood transfusion  · We will continue to monitor his hemoglobin during hospitalization  3  Thrombocytosis status post splenectomy  4  Portal vein thrombosis  · The patient is on a continuous heparin infusion for his pulmonary embolism  5  Leukocytosisthis may be related to his recent splenectomy  He does not have signs of infection so we will hold on antibiotic administration  6  Disposition: Can likely be transferred to the medical surgical floor later today  Critical Care Time:   Documented critical care time excludes any procedures documented elsewhere  It also excludes any family updates    _____________________________________________________________________    HPI/24hr events:   Events of admission noted  The patient denies chest pain or shortness of breath  Medications:    cyanocobalamin 1,000 mcg Oral Daily   docusate sodium 100 mg Oral BID   ferrous sulfate 325 mg Oral BID   pantoprazole 40 mg Oral Early Morning   predniSONE 10 mg Oral Daily         heparin (porcine) 3-30 Units/kg/hr (Order-Specific) Last Rate: 20 Units/kg/hr (06/07/17 0240)         Physical exam:  Vitals: Body mass index is 27 09 kg/(m^2)  Blood pressure 127/75, pulse 78, temperature (!) 97 4 °F (36 3 °C), resp  rate 12, height 5' 9" (1 753 m), weight 83 2 kg (183 lb 6 8 oz), SpO2 96 %  ,  Temp  Min: 96 8 °F (36 °C)  Max: 98 5 °F (36 9 °C)  IBW: 70 7 kg    SpO2: 96 %  SpO2 Activity: At Rest  O2 Device: None (Room air)      Intake/Output Summary (Last 24 hours) at 06/07/17 0554  Last data filed at 06/07/17 0315   Gross per 24 hour   Intake           791 68 ml   Output              500 ml   Net           291 68 ml       Invasive/non-invasive ventilation settings:   Respiratory    Lab Data (Last 4 hours)    None         O2/Vent Data (Last 4 hours)    None              Invasive Devices     Peripheral Intravenous Line            Peripheral IV 06/06/17 Left Antecubital less than 1 day    Peripheral IV 06/06/17 Right Antecubital less than 1 day                  Physical Exam:  Gen: Awake and alert  HEENT: Pupils are equal round and reactive to light  The sclerae are mildly icteric  Neck: Supple negative for lymphadenopathy  Chest: Essentially clear to auscultation bilaterally  Cor: Regular rate and rhythm  Abd: Mildly distended but soft  Ext: There is mild lower extremity edema right greater than left  Neuro: Awake and alert, able to move all extremities without difficulty  Skin: Warm and dry      Diagnostic Data:  Lab: I have personally reviewed pertinent lab results     CBC:     Results from last 7 days  Lab Units 06/07/17  0415 06/06/17  1636 06/06/17  1430   WBC Thousand/uL 18 55* 16 01* 15 59*   HEMOGLOBIN g/dL 7 3* 5 5* 5 9*   HEMATOCRIT % 23 1* 18 9* 20 0*   PLATELETS Thousands/uL 747* 742* 1021*       CMP:     Results from last 7 days  Lab Units 06/07/17  0415 06/06/17  1636 06/03/17  0850   SODIUM mmol/L 142 142 141   POTASSIUM mmol/L 4 2 5 0 4 3   CHLORIDE mmol/L 106 105 107   CO2 mmol/L 26 28 25   BUN mg/dL 16 14 12   CREATININE mg/dL 0 99 0 97 0 87   CALCIUM mg/dL 8 8 9 3 8 9   TOTAL PROTEIN g/dL 6 1* 7 0  7 1 6 1*   BILIRUBIN TOTAL mg/dL 2 20* 3 14*  2 97*  --    ALK PHOS U/L 105 120*  120*  --    ALT U/L 35 43  43  --    AST U/L 19 44  44  --    GLUCOSE RANDOM mg/dL 164* 114  --      PT/INR:   Lab Results   Component Value Date    INR 1 17 (H) 06/07/2017    INR 1 07 06/06/2017   ,   Magnesium:   Results from last 7 days  Lab Units 06/07/17  0415   MAGNESIUM mg/dL 2 2     Phosphorous:   Results from last 7 days  Lab Units 06/07/17  0415   PHOSPHORUS mg/dL 4 4*       Microbiology:        Imaging:      Cardiac lab/EKG/telemetry/ECHO:       VTE Prophylaxis: Heparin    Code Status: Level 1 - Full Code    Christina VISHNU Pope    Portions of the record may have been created with voice recognition software  Occasional wrong word or "sound a like" substitutions may have occurred due to the inherent limitations of voice recognition software  Read the chart carefully and recognize, using context, where substitutions have occurred

## 2017-06-07 NOTE — PLAN OF CARE
HEMATOLOGIC - ADULT     Maintains hematologic stability Progressing        PAIN - ADULT     Verbalizes/displays adequate comfort level or baseline comfort level Progressing        RESPIRATORY - ADULT     Achieves optimal ventilation and oxygenation Progressing        SAFETY ADULT     Patient will remain free of falls Progressing     Maintain or return to baseline ADL function Progressing     Maintain or return mobility status to optimal level Progressing

## 2017-06-07 NOTE — PLAN OF CARE

## 2017-06-07 NOTE — SOCIAL WORK
Met with patient at bedside to address d/c needs; CM discussed role/purpose  Patient resides with his wife and was independent with ADL's prior to admission  No DME use needed; No HHC services reported  Patient drives and has transportation available at d/c  Support/help at home  Rx coverage available, scripts filled at [facility]  PCP is Dr Froylan Siu  No d/c needs expressed at this time  Patient provided with CM's contact information  CM following

## 2017-06-08 PROBLEM — I81 PORTAL VEIN THROMBOSIS: Status: ACTIVE | Noted: 2017-06-08

## 2017-06-08 LAB
ALBUMIN SERPL BCP-MCNC: 3.2 G/DL (ref 3.5–5)
ALP SERPL-CCNC: 100 U/L (ref 46–116)
ALT SERPL W P-5'-P-CCNC: 31 U/L (ref 12–78)
ANION GAP SERPL CALCULATED.3IONS-SCNC: 9 MMOL/L (ref 4–13)
AST SERPL W P-5'-P-CCNC: 21 U/L (ref 5–45)
BILIRUB SERPL-MCNC: 2.17 MG/DL (ref 0.2–1)
BLD SMEAR INTERP: NORMAL
BUN SERPL-MCNC: 14 MG/DL (ref 5–25)
CALCIUM SERPL-MCNC: 8.9 MG/DL (ref 8.3–10.1)
CHLORIDE SERPL-SCNC: 107 MMOL/L (ref 100–108)
CO2 SERPL-SCNC: 30 MMOL/L (ref 21–32)
CREAT SERPL-MCNC: 0.94 MG/DL (ref 0.6–1.3)
DAT IGG-SP REAG RBCCO QL: NORMAL
DAT POLY-SP REAG RBC QL: NORMAL
ERYTHROCYTE [DISTWIDTH] IN BLOOD BY AUTOMATED COUNT: 29.9 % (ref 11.6–15.1)
GFR SERPL CREATININE-BSD FRML MDRD: >60 ML/MIN/1.73SQ M
GLUCOSE SERPL-MCNC: 94 MG/DL (ref 65–140)
HCT VFR BLD AUTO: 23.9 % (ref 36.5–49.3)
HGB BLD-MCNC: 7.4 G/DL (ref 12–17)
MCH RBC QN AUTO: 36.8 PG (ref 26.8–34.3)
MCHC RBC AUTO-ENTMCNC: 31 G/DL (ref 31.4–37.4)
MCV RBC AUTO: 119 FL (ref 82–98)
PLATELET # BLD AUTO: 900 THOUSANDS/UL (ref 149–390)
PMV BLD AUTO: 9.9 FL (ref 8.9–12.7)
POTASSIUM SERPL-SCNC: 3.6 MMOL/L (ref 3.5–5.3)
PROT SERPL-MCNC: 6.1 G/DL (ref 6.4–8.2)
RBC # BLD AUTO: 2.01 MILLION/UL (ref 3.88–5.62)
RETICS # AUTO: ABNORMAL 10*3/UL (ref 14356–105094)
RETICS # CALC: 24.57 % (ref 0.37–1.87)
SODIUM SERPL-SCNC: 146 MMOL/L (ref 136–145)
WBC # BLD AUTO: 22.64 THOUSAND/UL (ref 4.31–10.16)

## 2017-06-08 PROCEDURE — 82955 ASSAY OF G6PD ENZYME: CPT | Performed by: PHYSICIAN ASSISTANT

## 2017-06-08 PROCEDURE — A9270 NON-COVERED ITEM OR SERVICE: HCPCS | Performed by: NURSE PRACTITIONER

## 2017-06-08 PROCEDURE — 88184 FLOWCYTOMETRY/ TC 1 MARKER: CPT | Performed by: PHYSICIAN ASSISTANT

## 2017-06-08 PROCEDURE — 86880 COOMBS TEST DIRECT: CPT | Performed by: INTERNAL MEDICINE

## 2017-06-08 PROCEDURE — 83625 ASSAY OF LDH ENZYMES: CPT | Performed by: PHYSICIAN ASSISTANT

## 2017-06-08 PROCEDURE — 80053 COMPREHEN METABOLIC PANEL: CPT | Performed by: NURSE PRACTITIONER

## 2017-06-08 PROCEDURE — 86902 BLOOD TYPE ANTIGEN DONOR EA: CPT

## 2017-06-08 PROCEDURE — 88185 FLOWCYTOMETRY/TC ADD-ON: CPT | Performed by: PHYSICIAN ASSISTANT

## 2017-06-08 PROCEDURE — 85045 AUTOMATED RETICULOCYTE COUNT: CPT | Performed by: PHYSICIAN ASSISTANT

## 2017-06-08 PROCEDURE — 85027 COMPLETE CBC AUTOMATED: CPT | Performed by: NURSE PRACTITIONER

## 2017-06-08 PROCEDURE — 86880 COOMBS TEST DIRECT: CPT | Performed by: PHYSICIAN ASSISTANT

## 2017-06-08 PROCEDURE — 83615 LACTATE (LD) (LDH) ENZYME: CPT | Performed by: PHYSICIAN ASSISTANT

## 2017-06-08 PROCEDURE — 82668 ASSAY OF ERYTHROPOIETIN: CPT | Performed by: PHYSICIAN ASSISTANT

## 2017-06-08 PROCEDURE — 83010 ASSAY OF HAPTOGLOBIN QUANT: CPT | Performed by: PHYSICIAN ASSISTANT

## 2017-06-08 PROCEDURE — P9016 RBC LEUKOCYTES REDUCED: HCPCS

## 2017-06-08 RX ADMIN — PANTOPRAZOLE SODIUM 40 MG: 40 TABLET, DELAYED RELEASE ORAL at 06:17

## 2017-06-08 RX ADMIN — PREDNISONE 80 MG: 20 TABLET ORAL at 09:24

## 2017-06-08 RX ADMIN — FERROUS SULFATE TAB 325 MG (65 MG ELEMENTAL FE) 325 MG: 325 (65 FE) TAB at 09:23

## 2017-06-08 RX ADMIN — RIVAROXABAN 15 MG: 15 TABLET, FILM COATED ORAL at 09:23

## 2017-06-08 RX ADMIN — RIVAROXABAN 15 MG: 15 TABLET, FILM COATED ORAL at 18:20

## 2017-06-08 RX ADMIN — CYANOCOBALAMIN TAB 500 MCG 1000 MCG: 500 TAB at 09:23

## 2017-06-08 RX ADMIN — FERROUS SULFATE TAB 325 MG (65 MG ELEMENTAL FE) 325 MG: 325 (65 FE) TAB at 18:20

## 2017-06-08 NOTE — PROGRESS NOTES
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    Hyperbilirubinemia      Plan:    · Acute PE  · 2* portal thrombosis, no DVT on 06/06 venous duplex  · Mild to moderate TR in 2D echo, w/mild pulm HTN 35 mmHg, no s/sx of overt bleed  · Continue xarelto BID will need OP f/u with hematology given concern for possible PNH for pt's duration of AC    Hemolytic anemia due to warm ab (macrocytic)   Appreciate hematology consult   Previously responsive to prednisone, s/p splenectomy however still w/ acute on chronic anemia on admission, now concerning for concurrent PNH given portal venous thrombosis, pt declining bone marrow aspirate at this time for MDS evaluation   H/h improved appropriately from 2 IU PRBCs, now 7 4 and pt only mildly asy mptomatic   Still direct montes de oca and CEE IGG positive w/reticulocytosis and no helmet cells despite LDH elevation   Repeat 1 IU PRBC and CBC in AM   F/u with Chioma in OP setting, pt to call Monday to move appt up from 6/18 to further discuss PNH workup   Continue prednisone per hematology, continue FeSO4   If H/H stable tomorrow anticipate discharge    Leukocytosis   Glucocorticoid induced, pt denies any acute infectious s/sx no fevers over night   CBC w/ diff in AM     Moderate TR   OP surveillance recommended given PE as etiology          VTE Pharmacologic Prophylaxis:   Pharmacologic: Rivaroxaban (Xarelto)  Mechanical VTE Prophylaxis in Place: Yes    Patient Centered Rounds: I have performed bedside rounds with nursing staff today      Discussions with Specialists or Other Care Team Provider: hematology PA    Education and Discussions with Family / Patient: op monitoring and f/u lab results thus far    Time Spent for Care: 30 minutes  More than 50% of total time spent on counseling and coordination of care as described above  Current Length of Stay: 2 day(s)    Current Patient Status: Inpatient   Certification Statement: The patient will continue to require additional inpatient hospital stay due to acute hemolytic anemia    Discharge Plan / Estimated Discharge Date: next 24 H    Code Status: Level 1 - Full Code      Subjective:   Pt reports SOB has significantly improved and w/o lightheadedness/dizziness w/ambulation but still significant fatigue  No CP, no fevers/chills, URI s/sx  Dry nonproductive cough  No abdominal pain n/v or diarrhea  Positive melena negative BRBPR  Pt on FeSO4 supplements    Objective:     Vitals:   Temp (24hrs), Av 2 °F (37 3 °C), Min:98 3 °F (36 8 °C), Max:99 7 °F (37 6 °C)    HR:  [76-80] 76  Resp:  [17-18] 18  BP: (146-155)/(75-82) 146/75  SpO2:  [98 %] 98 %  Body mass index is 27 09 kg/(m^2)  Input and Output Summary (last 24 hours): Intake/Output Summary (Last 24 hours) at 17 1529  Last data filed at 17 1401   Gross per 24 hour   Intake              480 ml   Output                0 ml   Net              480 ml       Physical Exam:     Physical Exam   Constitutional: He appears well-developed and well-nourished  HENT:   Head: Normocephalic and atraumatic  Right Ear: External ear normal    Left Ear: External ear normal    Mouth/Throat: No oropharyngeal exudate  Eyes: Conjunctivae are normal  Right eye exhibits no discharge  Left eye exhibits no discharge  No scleral icterus  Cardiovascular: Normal rate, regular rhythm, normal heart sounds and intact distal pulses  Exam reveals no gallop and no friction rub  No murmur heard  Pulmonary/Chest: Effort normal and breath sounds normal  No respiratory distress  He has no wheezes  He has no rales  He exhibits no tenderness  Abdominal: Soft  He exhibits no distension  There is no tenderness   There is no rebound and no guarding  Musculoskeletal: He exhibits no edema  Lymphadenopathy:     He has no cervical adenopathy  Neurological: He is alert  Skin: Skin is warm and dry  He is not diaphoretic  Vitals reviewed  (  Be Sure to Include Physical Exam: Delete this entire line when you have entered your exam)    Additional Data:     Labs:      Results from last 7 days  Lab Units 06/08/17 0457 06/07/17  0415   WBC Thousand/uL 22 64* 18 55*   HEMOGLOBIN g/dL 7 4* 7 3*   HEMATOCRIT % 23 9* 23 1*   PLATELETS Thousands/uL 900* 747*   LYMPHO PCT %  --  14   MONO PCT MAN %  --  2*   EOSINO PCT MANUAL %  --  0       Results from last 7 days  Lab Units 06/08/17  0457   SODIUM mmol/L 146*   POTASSIUM mmol/L 3 6   CHLORIDE mmol/L 107   CO2 mmol/L 30   BUN mg/dL 14   CREATININE mg/dL 0 94   CALCIUM mg/dL 8 9   TOTAL PROTEIN g/dL 6 1*   BILIRUBIN TOTAL mg/dL 2 17*   ALK PHOS U/L 100   ALT U/L 31   AST U/L 21   GLUCOSE RANDOM mg/dL 94       Results from last 7 days  Lab Units 06/07/17  0415   INR  1 17*       * I Have Reviewed All Lab Data Listed Above  * Additional Pertinent Lab Tests Reviewed: [facility] Admission Reviewed    Imaging:    Imaging Reports Reviewed Today Include:   Imaging Personally Reviewed by Myself Includes:      Recent Cultures (last 7 days):           Last 24 Hours Medication List:     cyanocobalamin 1,000 mcg Oral Daily   docusate sodium 100 mg Oral BID   ferrous sulfate 325 mg Oral BID   pantoprazole 40 mg Oral Early Morning   predniSONE 80 mg Oral Daily   rivaroxaban 15 mg Oral BID With Meals        Today, Patient Was Seen By: Lew Dozier PA-C    ** Please Note: This note has been constructed using a voice recognition system   **

## 2017-06-08 NOTE — PLAN OF CARE
DISCHARGE PLANNING - CARE MANAGEMENT     Discharge to post-acute care or home with appropriate resources Progressing        HEMATOLOGIC - ADULT     Maintains hematologic stability Progressing        PAIN - ADULT     Verbalizes/displays adequate comfort level or baseline comfort level Progressing        Potential for Falls     Patient will remain free of falls Progressing        RESPIRATORY - ADULT     Achieves optimal ventilation and oxygenation Progressing        SAFETY ADULT     Patient will remain free of falls Progressing     Maintain or return to baseline ADL function Progressing     Maintain or return mobility status to optimal level Progressing

## 2017-06-09 VITALS
TEMPERATURE: 96.5 F | DIASTOLIC BLOOD PRESSURE: 85 MMHG | WEIGHT: 183.42 LBS | SYSTOLIC BLOOD PRESSURE: 150 MMHG | BODY MASS INDEX: 27.17 KG/M2 | OXYGEN SATURATION: 99 % | HEART RATE: 74 BPM | RESPIRATION RATE: 18 BRPM | HEIGHT: 69 IN

## 2017-06-09 PROBLEM — R03.0 ELEVATED BLOOD PRESSURE READING WITHOUT DIAGNOSIS OF HYPERTENSION: Status: ACTIVE | Noted: 2017-06-09

## 2017-06-09 LAB
ABO GROUP BLD BPU: NORMAL
ALBUMIN SERPL BCP-MCNC: 3.2 G/DL (ref 3.5–5)
ALP SERPL-CCNC: 97 U/L (ref 46–116)
ALT SERPL W P-5'-P-CCNC: 29 U/L (ref 12–78)
ANION GAP SERPL CALCULATED.3IONS-SCNC: 6 MMOL/L (ref 4–13)
ANISOCYTOSIS BLD QL SMEAR: PRESENT
AST SERPL W P-5'-P-CCNC: 24 U/L (ref 5–45)
BASOPHILS # BLD MANUAL: 0 THOUSAND/UL (ref 0–0.1)
BASOPHILS NFR MAR MANUAL: 0 % (ref 0–1)
BILIRUB SERPL-MCNC: 2.38 MG/DL (ref 0.2–1)
BPU ID: NORMAL
BUN SERPL-MCNC: 16 MG/DL (ref 5–25)
CALCIUM SERPL-MCNC: 8.9 MG/DL (ref 8.3–10.1)
CHLORIDE SERPL-SCNC: 105 MMOL/L (ref 100–108)
CO2 SERPL-SCNC: 30 MMOL/L (ref 21–32)
CREAT SERPL-MCNC: 0.91 MG/DL (ref 0.6–1.3)
CROSSMATCH: NORMAL
EOSINOPHIL # BLD MANUAL: 0 THOUSAND/UL (ref 0–0.4)
EOSINOPHIL NFR BLD MANUAL: 0 % (ref 0–6)
EPO SERPL-ACNC: 38.9 MIU/ML (ref 2.6–18.5)
ERYTHROCYTE [DISTWIDTH] IN BLOOD BY AUTOMATED COUNT: 28.9 % (ref 11.6–15.1)
GFR SERPL CREATININE-BSD FRML MDRD: >60 ML/MIN/1.73SQ M
GLUCOSE SERPL-MCNC: 100 MG/DL (ref 65–140)
HAPTOGLOB SERPL-MCNC: <10 MG/DL (ref 34–200)
HCT VFR BLD AUTO: 28.8 % (ref 36.5–49.3)
HGB BLD-MCNC: 8.8 G/DL (ref 12–17)
HYPERCHROMIA BLD QL SMEAR: PRESENT
LG PLATELETS BLD QL SMEAR: PRESENT
LYMPHOCYTES # BLD AUTO: 24 % (ref 14–44)
LYMPHOCYTES # BLD AUTO: 4.99 THOUSAND/UL (ref 0.6–4.47)
MCH RBC QN AUTO: 34.8 PG (ref 26.8–34.3)
MCHC RBC AUTO-ENTMCNC: 30.6 G/DL (ref 31.4–37.4)
MCV RBC AUTO: 114 FL (ref 82–98)
MONOCYTES # BLD AUTO: 1.25 THOUSAND/UL (ref 0–1.22)
MONOCYTES NFR BLD: 6 % (ref 4–12)
NEUTROPHILS # BLD MANUAL: 14.57 THOUSAND/UL (ref 1.85–7.62)
NEUTS SEG NFR BLD AUTO: 70 % (ref 43–75)
OVALOCYTES BLD QL SMEAR: PRESENT
PLATELET # BLD AUTO: 815 THOUSANDS/UL (ref 149–390)
PLATELET BLD QL SMEAR: ADEQUATE
PMV BLD AUTO: 9.3 FL (ref 8.9–12.7)
POLYCHROMASIA BLD QL SMEAR: PRESENT
POTASSIUM SERPL-SCNC: 3.5 MMOL/L (ref 3.5–5.3)
PROT SERPL-MCNC: 6.2 G/DL (ref 6.4–8.2)
RBC # BLD AUTO: 2.53 MILLION/UL (ref 3.88–5.62)
SODIUM SERPL-SCNC: 141 MMOL/L (ref 136–145)
TOTAL CELLS COUNTED SPEC: 100
UNIT DISPENSE STATUS: NORMAL
UNIT PRODUCT CODE: NORMAL
UNIT RH: NORMAL
WBC # BLD AUTO: 20.81 THOUSAND/UL (ref 4.31–10.16)

## 2017-06-09 PROCEDURE — A9270 NON-COVERED ITEM OR SERVICE: HCPCS | Performed by: NURSE PRACTITIONER

## 2017-06-09 PROCEDURE — 85027 COMPLETE CBC AUTOMATED: CPT | Performed by: PHYSICIAN ASSISTANT

## 2017-06-09 PROCEDURE — 85007 BL SMEAR W/DIFF WBC COUNT: CPT | Performed by: PHYSICIAN ASSISTANT

## 2017-06-09 PROCEDURE — 80053 COMPREHEN METABOLIC PANEL: CPT | Performed by: INTERNAL MEDICINE

## 2017-06-09 RX ORDER — PREDNISONE 20 MG/1
80 TABLET ORAL DAILY
Qty: 56 TABLET | Refills: 0 | Status: SHIPPED | OUTPATIENT
Start: 2017-06-09 | End: 2017-06-23

## 2017-06-09 RX ORDER — PANTOPRAZOLE SODIUM 40 MG/1
40 TABLET, DELAYED RELEASE ORAL
Qty: 30 TABLET | Refills: 0 | Status: SHIPPED | OUTPATIENT
Start: 2017-06-09 | End: 2018-08-13 | Stop reason: SDUPTHER

## 2017-06-09 RX ADMIN — PANTOPRAZOLE SODIUM 40 MG: 40 TABLET, DELAYED RELEASE ORAL at 06:35

## 2017-06-09 RX ADMIN — PREDNISONE 80 MG: 20 TABLET ORAL at 10:14

## 2017-06-09 RX ADMIN — RIVAROXABAN 15 MG: 15 TABLET, FILM COATED ORAL at 10:14

## 2017-06-09 RX ADMIN — FERROUS SULFATE TAB 325 MG (65 MG ELEMENTAL FE) 325 MG: 325 (65 FE) TAB at 10:14

## 2017-06-09 RX ADMIN — CYANOCOBALAMIN TAB 500 MCG 1000 MCG: 500 TAB at 10:14

## 2017-06-09 NOTE — PLAN OF CARE
Problem: RESPIRATORY - ADULT  Goal: Achieves optimal ventilation and oxygenation  INTERVENTIONS:  - Assess for changes in respiratory status  - Assess for changes in mentation and behavior  - Position to facilitate oxygenation and minimize respiratory effort  - Oxygen administration by appropriate delivery method based on oxygen saturation (per order) or ABGs  - Initiate smoking cessation education as indicated  - Encourage broncho-pulmonary hygiene including cough, deep breathe, Incentive Spirometry  - Assess the need for suctioning and aspirate as needed  - Assess and instruct to report SOB or any respiratory difficulty  - Respiratory Therapy support as indicated   Outcome: Progressing    Problem: HEMATOLOGIC - ADULT  Goal: Maintains hematologic stability  INTERVENTIONS  - Assess for signs and symptoms of bleeding or hemorrhage  - Monitor labs  - Administer supportive blood products/factors as ordered and appropriate   Outcome: Progressing    Problem: PAIN - ADULT  Goal: Verbalizes/displays adequate comfort level or baseline comfort level  Interventions:  - Encourage patient to monitor pain and request assistance  - Assess pain using appropriate pain scale  - Administer analgesics based on type and severity of pain and evaluate response  - Implement non-pharmacological measures as appropriate and evaluate response  - Consider cultural and social influences on pain and pain management  - Notify physician/advanced practitioner if interventions unsuccessful or patient reports new pain   Outcome: Progressing    Problem: SAFETY ADULT  Goal: Patient will remain free of falls  INTERVENTIONS:  - Assess patient frequently for physical needs  - Identify cognitive and physical deficits and behaviors that affect risk of falls    - [place] fall precautions as indicated by assessment   - Educate patient/family on patient safety including physical limitations  - Instruct patient to call for assistance with activity based on assessment  - Modify environment to reduce risk of injury  - Consider OT/PT consult to assist with strengthening/mobility   Outcome: Progressing  Goal: Maintain or return to baseline ADL function  INTERVENTIONS:  - Assess patients ability to carry out ADLs; assess patients baseline for ADL function and identify physical deficits which impact ability to perform ADLs (bathing, care of mouth/teeth, toileting, grooming, dressing, etc )  - Assess/evaluate cause of self-care deficits   - Assess range of motion  - Assess patients mobility; develop plan if impaired  - Assess patients need for assistive devices and provide as appropriate  - Encourage maximum independence but intervene and supervise when necessary  ¯ Involve family in performance of ADLs  ¯ Assess for home care needs following discharge   ¯ Request OT consult to assist with ADL evaluation and planning for discharge  ¯ Provide patient education as appropriate   Outcome: Progressing  Goal: Maintain or return mobility status to optimal level  INTERVENTIONS:  - Assess patients baseline mobility status (ambulation, transfers, stairs, etc )   - Identify cognitive and physical deficits and behaviors that affect mobility  - Identify mobility aids required to assist with transfers and/or ambulation (gait belt, sit-to-stand, lift, walker, cane, etc )  - [place] fall precautions as indicated by assessment  - Record patient progress and toleration of activity level on Mobility SBAR; progress patient to next Phase/Stage  - Instruct patient to call for assistance with activity based on assessment  - Request Rehabilitation consult to assist with strengthening/weightbearing, etc    Outcome: Progressing    Problem: Potential for Falls  Goal: Patient will remain free of falls  INTERVENTIONS:  - Assess patient frequently for physical needs  - Identify cognitive and physical deficits and behaviors that affect risk of falls    - [place] fall precautions as indicated by assessment   - Educate patient/family on patient safety including physical limitations  - Instruct patient to call for assistance with activity based on assessment  - Modify environment to reduce risk of injury  - Consider OT/PT consult to assist with strengthening/mobility   Outcome: Progressing    Problem: DISCHARGE PLANNING - CARE MANAGEMENT  Goal: Discharge to post-acute care or home with appropriate resources  INTERVENTIONS:  - Conduct assessment to determine patient/family and health care team treatment goals, and need for post-acute services based on payer coverage, community resources, and patient preferences, and barriers to discharge  - Address psychosocial, clinical, and financial barriers to discharge as identified in assessment in conjunction with the patient/family and health care team  - Arrange appropriate level of post-acute services according to patients needs and preference and payer coverage in collaboration with the physician and health care team  - Communicate with and update the patient/family, physician, and health care team regarding progress on the discharge plan  - Arrange appropriate transportation to post-acute venues   Outcome: Progressing

## 2017-06-10 LAB
G6PD BLD QN: 614 U/10E12 RBC (ref 146–376)
LDH SERPL-CCNC: 415 IU/L (ref 121–224)
LDH1 CFR SERPL ELPH: 38 % (ref 17–32)
LDH2 CFR SERPL ELPH: 39 % (ref 25–40)
LDH3 CFR SERPL ELPH: 15 % (ref 17–27)
LDH4 CFR SERPL ELPH: 4 % (ref 5–13)
LDH5 CFR SERPL ELPH: 4 % (ref 4–20)
RBC # BLD AUTO: 2.07 X10E6/UL (ref 4.14–5.8)

## 2017-06-12 ENCOUNTER — LAB (OUTPATIENT)
Dept: LAB | Facility: MEDICAL CENTER | Age: 62
End: 2017-06-12
Payer: COMMERCIAL

## 2017-06-12 ENCOUNTER — TRANSCRIBE ORDERS (OUTPATIENT)
Dept: ADMINISTRATIVE | Facility: HOSPITAL | Age: 62
End: 2017-06-12

## 2017-06-12 ENCOUNTER — GENERIC CONVERSION - ENCOUNTER (OUTPATIENT)
Dept: OTHER | Facility: OTHER | Age: 62
End: 2017-06-12

## 2017-06-12 DIAGNOSIS — D59.11 HEMOLYTIC ANEMIA DUE TO WARM ANTIBODY (HCC): ICD-10-CM

## 2017-06-12 LAB
ERYTHROCYTE [DISTWIDTH] IN BLOOD BY AUTOMATED COUNT: 22.3 % (ref 11.6–15.1)
HCT VFR BLD AUTO: 31.1 % (ref 36.5–49.3)
HGB BLD-MCNC: 9.4 G/DL (ref 12–17)
MCH RBC QN AUTO: 34.2 PG (ref 26.8–34.3)
MCHC RBC AUTO-ENTMCNC: 30.2 G/DL (ref 31.4–37.4)
MCV RBC AUTO: 113 FL (ref 82–98)
PLATELET # BLD AUTO: 810 THOUSANDS/UL (ref 149–390)
PMV BLD AUTO: 9.9 FL (ref 8.9–12.7)
RBC # BLD AUTO: 2.75 MILLION/UL (ref 3.88–5.62)
WBC # BLD AUTO: 9.98 THOUSAND/UL (ref 4.31–10.16)

## 2017-06-12 PROCEDURE — 36415 COLL VENOUS BLD VENIPUNCTURE: CPT

## 2017-06-12 PROCEDURE — 85027 COMPLETE CBC AUTOMATED: CPT

## 2017-06-14 ENCOUNTER — ALLSCRIPTS OFFICE VISIT (OUTPATIENT)
Dept: OTHER | Facility: OTHER | Age: 62
End: 2017-06-14

## 2017-06-14 LAB — SCAN RESULT: NORMAL

## 2017-06-15 ENCOUNTER — ALLSCRIPTS OFFICE VISIT (OUTPATIENT)
Dept: OTHER | Facility: OTHER | Age: 62
End: 2017-06-15

## 2017-06-20 ENCOUNTER — GENERIC CONVERSION - ENCOUNTER (OUTPATIENT)
Dept: OTHER | Facility: OTHER | Age: 62
End: 2017-06-20

## 2017-06-20 ENCOUNTER — APPOINTMENT (OUTPATIENT)
Dept: LAB | Facility: MEDICAL CENTER | Age: 62
End: 2017-06-20
Payer: COMMERCIAL

## 2017-06-20 DIAGNOSIS — D58.9 HEREDITARY HEMOLYTIC ANEMIA, UNSPECIFIED (HCC): ICD-10-CM

## 2017-06-20 LAB
BASOPHILS # BLD AUTO: 0.01 THOUSANDS/ΜL (ref 0–0.1)
BASOPHILS NFR BLD AUTO: 0 % (ref 0–1)
EOSINOPHIL # BLD AUTO: 0.05 THOUSAND/ΜL (ref 0–0.61)
EOSINOPHIL NFR BLD AUTO: 0 % (ref 0–6)
ERYTHROCYTE [DISTWIDTH] IN BLOOD BY AUTOMATED COUNT: 18.6 % (ref 11.6–15.1)
HCT VFR BLD AUTO: 32.5 % (ref 36.5–49.3)
HGB BLD-MCNC: 10 G/DL (ref 12–17)
LYMPHOCYTES # BLD AUTO: 2.12 THOUSANDS/ΜL (ref 0.6–4.47)
LYMPHOCYTES NFR BLD AUTO: 19 % (ref 14–44)
MCH RBC QN AUTO: 35.5 PG (ref 26.8–34.3)
MCHC RBC AUTO-ENTMCNC: 30.8 G/DL (ref 31.4–37.4)
MCV RBC AUTO: 115 FL (ref 82–98)
MONOCYTES # BLD AUTO: 0.95 THOUSAND/ΜL (ref 0.17–1.22)
MONOCYTES NFR BLD AUTO: 8 % (ref 4–12)
NEUTROPHILS # BLD AUTO: 8.27 THOUSANDS/ΜL (ref 1.85–7.62)
NEUTS SEG NFR BLD AUTO: 73 % (ref 43–75)
NRBC BLD AUTO-RTO: 0 /100 WBCS
PLATELET # BLD AUTO: 628 THOUSANDS/UL (ref 149–390)
PMV BLD AUTO: 10.3 FL (ref 8.9–12.7)
RBC # BLD AUTO: 2.82 MILLION/UL (ref 3.88–5.62)
WBC # BLD AUTO: 11.44 THOUSAND/UL (ref 4.31–10.16)

## 2017-06-20 PROCEDURE — 36415 COLL VENOUS BLD VENIPUNCTURE: CPT

## 2017-06-20 PROCEDURE — 85025 COMPLETE CBC W/AUTO DIFF WBC: CPT

## 2017-06-27 ENCOUNTER — GENERIC CONVERSION - ENCOUNTER (OUTPATIENT)
Dept: OTHER | Facility: OTHER | Age: 62
End: 2017-06-27

## 2017-06-27 ENCOUNTER — LAB (OUTPATIENT)
Dept: LAB | Facility: MEDICAL CENTER | Age: 62
End: 2017-06-27
Payer: COMMERCIAL

## 2017-06-27 DIAGNOSIS — D58.9 HEREDITARY HEMOLYTIC ANEMIA, UNSPECIFIED (HCC): ICD-10-CM

## 2017-06-27 LAB
BASOPHILS # BLD AUTO: 0.04 THOUSANDS/ΜL (ref 0–0.1)
BASOPHILS NFR BLD AUTO: 0 % (ref 0–1)
EOSINOPHIL # BLD AUTO: 0.12 THOUSAND/ΜL (ref 0–0.61)
EOSINOPHIL NFR BLD AUTO: 1 % (ref 0–6)
ERYTHROCYTE [DISTWIDTH] IN BLOOD BY AUTOMATED COUNT: 15.7 % (ref 11.6–15.1)
HCT VFR BLD AUTO: 36.5 % (ref 36.5–49.3)
HGB BLD-MCNC: 11.4 G/DL (ref 12–17)
LYMPHOCYTES # BLD AUTO: 3.76 THOUSANDS/ΜL (ref 0.6–4.47)
LYMPHOCYTES NFR BLD AUTO: 36 % (ref 14–44)
MCH RBC QN AUTO: 35.3 PG (ref 26.8–34.3)
MCHC RBC AUTO-ENTMCNC: 31.2 G/DL (ref 31.4–37.4)
MCV RBC AUTO: 113 FL (ref 82–98)
MONOCYTES # BLD AUTO: 1.03 THOUSAND/ΜL (ref 0.17–1.22)
MONOCYTES NFR BLD AUTO: 10 % (ref 4–12)
NEUTROPHILS # BLD AUTO: 5.34 THOUSANDS/ΜL (ref 1.85–7.62)
NEUTS SEG NFR BLD AUTO: 53 % (ref 43–75)
NRBC BLD AUTO-RTO: 0 /100 WBCS
PLATELET # BLD AUTO: 418 THOUSANDS/UL (ref 149–390)
PMV BLD AUTO: 10.7 FL (ref 8.9–12.7)
RBC # BLD AUTO: 3.23 MILLION/UL (ref 3.88–5.62)
WBC # BLD AUTO: 10.32 THOUSAND/UL (ref 4.31–10.16)

## 2017-06-27 PROCEDURE — 85025 COMPLETE CBC W/AUTO DIFF WBC: CPT

## 2017-06-27 PROCEDURE — 36415 COLL VENOUS BLD VENIPUNCTURE: CPT

## 2017-07-05 ENCOUNTER — GENERIC CONVERSION - ENCOUNTER (OUTPATIENT)
Dept: OTHER | Facility: OTHER | Age: 62
End: 2017-07-05

## 2017-07-05 ENCOUNTER — LAB (OUTPATIENT)
Dept: LAB | Facility: MEDICAL CENTER | Age: 62
End: 2017-07-05
Payer: COMMERCIAL

## 2017-07-05 DIAGNOSIS — D58.9 HEREDITARY HEMOLYTIC ANEMIA, UNSPECIFIED (HCC): ICD-10-CM

## 2017-07-05 LAB
BASOPHILS # BLD AUTO: 0.04 THOUSANDS/ΜL (ref 0–0.1)
BASOPHILS NFR BLD AUTO: 0 % (ref 0–1)
EOSINOPHIL # BLD AUTO: 0.17 THOUSAND/ΜL (ref 0–0.61)
EOSINOPHIL NFR BLD AUTO: 2 % (ref 0–6)
ERYTHROCYTE [DISTWIDTH] IN BLOOD BY AUTOMATED COUNT: 14.2 % (ref 11.6–15.1)
HCT VFR BLD AUTO: 39.6 % (ref 36.5–49.3)
HGB BLD-MCNC: 12.6 G/DL (ref 12–17)
LYMPHOCYTES # BLD AUTO: 2.68 THOUSANDS/ΜL (ref 0.6–4.47)
LYMPHOCYTES NFR BLD AUTO: 30 % (ref 14–44)
MCH RBC QN AUTO: 35.3 PG (ref 26.8–34.3)
MCHC RBC AUTO-ENTMCNC: 31.8 G/DL (ref 31.4–37.4)
MCV RBC AUTO: 111 FL (ref 82–98)
MONOCYTES # BLD AUTO: 0.78 THOUSAND/ΜL (ref 0.17–1.22)
MONOCYTES NFR BLD AUTO: 9 % (ref 4–12)
NEUTROPHILS # BLD AUTO: 5.24 THOUSANDS/ΜL (ref 1.85–7.62)
NEUTS SEG NFR BLD AUTO: 59 % (ref 43–75)
NRBC BLD AUTO-RTO: 0 /100 WBCS
PLATELET # BLD AUTO: 437 THOUSANDS/UL (ref 149–390)
PMV BLD AUTO: 10.5 FL (ref 8.9–12.7)
RBC # BLD AUTO: 3.57 MILLION/UL (ref 3.88–5.62)
WBC # BLD AUTO: 8.93 THOUSAND/UL (ref 4.31–10.16)

## 2017-07-05 PROCEDURE — 85025 COMPLETE CBC W/AUTO DIFF WBC: CPT

## 2017-07-05 PROCEDURE — 36415 COLL VENOUS BLD VENIPUNCTURE: CPT

## 2017-07-11 ENCOUNTER — TRANSCRIBE ORDERS (OUTPATIENT)
Dept: ADMINISTRATIVE | Facility: HOSPITAL | Age: 62
End: 2017-07-11

## 2017-07-11 ENCOUNTER — LAB (OUTPATIENT)
Dept: LAB | Facility: MEDICAL CENTER | Age: 62
End: 2017-07-11
Payer: COMMERCIAL

## 2017-07-11 DIAGNOSIS — D58.9 HEREDITARY HEMOLYTIC ANEMIA, UNSPECIFIED (HCC): Primary | ICD-10-CM

## 2017-07-11 DIAGNOSIS — D58.9 HEREDITARY HEMOLYTIC ANEMIA, UNSPECIFIED (HCC): ICD-10-CM

## 2017-07-11 LAB
BASOPHILS # BLD AUTO: 0.05 THOUSANDS/ΜL (ref 0–0.1)
BASOPHILS NFR BLD AUTO: 1 % (ref 0–1)
EOSINOPHIL # BLD AUTO: 0.16 THOUSAND/ΜL (ref 0–0.61)
EOSINOPHIL NFR BLD AUTO: 3 % (ref 0–6)
ERYTHROCYTE [DISTWIDTH] IN BLOOD BY AUTOMATED COUNT: 13.7 % (ref 11.6–15.1)
HCT VFR BLD AUTO: 39.3 % (ref 36.5–49.3)
HGB BLD-MCNC: 12.4 G/DL (ref 12–17)
LYMPHOCYTES # BLD AUTO: 3.04 THOUSANDS/ΜL (ref 0.6–4.47)
LYMPHOCYTES NFR BLD AUTO: 47 % (ref 14–44)
MCH RBC QN AUTO: 34.9 PG (ref 26.8–34.3)
MCHC RBC AUTO-ENTMCNC: 31.6 G/DL (ref 31.4–37.4)
MCV RBC AUTO: 111 FL (ref 82–98)
MONOCYTES # BLD AUTO: 0.67 THOUSAND/ΜL (ref 0.17–1.22)
MONOCYTES NFR BLD AUTO: 10 % (ref 4–12)
NEUTROPHILS # BLD AUTO: 2.52 THOUSANDS/ΜL (ref 1.85–7.62)
NEUTS SEG NFR BLD AUTO: 39 % (ref 43–75)
NRBC BLD AUTO-RTO: 0 /100 WBCS
PLATELET # BLD AUTO: 482 THOUSANDS/UL (ref 149–390)
PMV BLD AUTO: 10.2 FL (ref 8.9–12.7)
RBC # BLD AUTO: 3.55 MILLION/UL (ref 3.88–5.62)
WBC # BLD AUTO: 6.45 THOUSAND/UL (ref 4.31–10.16)

## 2017-07-11 PROCEDURE — 85025 COMPLETE CBC W/AUTO DIFF WBC: CPT

## 2017-07-11 PROCEDURE — 36415 COLL VENOUS BLD VENIPUNCTURE: CPT

## 2017-07-17 ENCOUNTER — LAB (OUTPATIENT)
Dept: LAB | Facility: MEDICAL CENTER | Age: 62
End: 2017-07-17
Payer: COMMERCIAL

## 2017-07-17 DIAGNOSIS — D58.9 HEREDITARY HEMOLYTIC ANEMIA, UNSPECIFIED (HCC): ICD-10-CM

## 2017-07-17 LAB
BASOPHILS # BLD AUTO: 0.09 THOUSANDS/ΜL (ref 0–0.1)
BASOPHILS NFR BLD AUTO: 1 % (ref 0–1)
EOSINOPHIL # BLD AUTO: 0.25 THOUSAND/ΜL (ref 0–0.61)
EOSINOPHIL NFR BLD AUTO: 3 % (ref 0–6)
ERYTHROCYTE [DISTWIDTH] IN BLOOD BY AUTOMATED COUNT: 13.6 % (ref 11.6–15.1)
HCT VFR BLD AUTO: 38 % (ref 36.5–49.3)
HGB BLD-MCNC: 12.5 G/DL (ref 12–17)
LYMPHOCYTES # BLD AUTO: 3.73 THOUSANDS/ΜL (ref 0.6–4.47)
LYMPHOCYTES NFR BLD AUTO: 50 % (ref 14–44)
MCH RBC QN AUTO: 35.6 PG (ref 26.8–34.3)
MCHC RBC AUTO-ENTMCNC: 32.9 G/DL (ref 31.4–37.4)
MCV RBC AUTO: 108 FL (ref 82–98)
MONOCYTES # BLD AUTO: 0.7 THOUSAND/ΜL (ref 0.17–1.22)
MONOCYTES NFR BLD AUTO: 9 % (ref 4–12)
NEUTROPHILS # BLD AUTO: 2.79 THOUSANDS/ΜL (ref 1.85–7.62)
NEUTS SEG NFR BLD AUTO: 37 % (ref 43–75)
NRBC BLD AUTO-RTO: 0 /100 WBCS
PLATELET # BLD AUTO: 504 THOUSANDS/UL (ref 149–390)
PMV BLD AUTO: 10.5 FL (ref 8.9–12.7)
RBC # BLD AUTO: 3.51 MILLION/UL (ref 3.88–5.62)
WBC # BLD AUTO: 7.57 THOUSAND/UL (ref 4.31–10.16)

## 2017-07-17 PROCEDURE — 36415 COLL VENOUS BLD VENIPUNCTURE: CPT

## 2017-07-17 PROCEDURE — 85025 COMPLETE CBC W/AUTO DIFF WBC: CPT

## 2017-07-18 ENCOUNTER — ALLSCRIPTS OFFICE VISIT (OUTPATIENT)
Dept: OTHER | Facility: OTHER | Age: 62
End: 2017-07-18

## 2017-07-18 DIAGNOSIS — D59.19 OTHER AUTOIMMUNE HEMOLYTIC ANEMIAS: ICD-10-CM

## 2017-07-24 ENCOUNTER — LAB (OUTPATIENT)
Dept: LAB | Facility: MEDICAL CENTER | Age: 62
End: 2017-07-24
Payer: COMMERCIAL

## 2017-07-24 DIAGNOSIS — D58.9 HEREDITARY HEMOLYTIC ANEMIA, UNSPECIFIED (HCC): ICD-10-CM

## 2017-07-24 DIAGNOSIS — D59.19 OTHER AUTOIMMUNE HEMOLYTIC ANEMIAS: ICD-10-CM

## 2017-07-24 LAB
BASOPHILS # BLD AUTO: 0.09 THOUSANDS/ΜL (ref 0–0.1)
BASOPHILS NFR BLD AUTO: 1 % (ref 0–1)
EOSINOPHIL # BLD AUTO: 0.27 THOUSAND/ΜL (ref 0–0.61)
EOSINOPHIL NFR BLD AUTO: 3 % (ref 0–6)
ERYTHROCYTE [DISTWIDTH] IN BLOOD BY AUTOMATED COUNT: 13.4 % (ref 11.6–15.1)
FOLATE SERPL-MCNC: >20 NG/ML (ref 3.1–17.5)
HCT VFR BLD AUTO: 39.9 % (ref 36.5–49.3)
HGB BLD-MCNC: 13 G/DL (ref 12–17)
LYMPHOCYTES # BLD AUTO: 4.22 THOUSANDS/ΜL (ref 0.6–4.47)
LYMPHOCYTES NFR BLD AUTO: 55 % (ref 14–44)
MCH RBC QN AUTO: 35.4 PG (ref 26.8–34.3)
MCHC RBC AUTO-ENTMCNC: 32.6 G/DL (ref 31.4–37.4)
MCV RBC AUTO: 109 FL (ref 82–98)
MONOCYTES # BLD AUTO: 0.65 THOUSAND/ΜL (ref 0.17–1.22)
MONOCYTES NFR BLD AUTO: 8 % (ref 4–12)
NEUTROPHILS # BLD AUTO: 2.62 THOUSANDS/ΜL (ref 1.85–7.62)
NEUTS SEG NFR BLD AUTO: 33 % (ref 43–75)
NRBC BLD AUTO-RTO: 0 /100 WBCS
PLATELET # BLD AUTO: 487 THOUSANDS/UL (ref 149–390)
PMV BLD AUTO: 10.7 FL (ref 8.9–12.7)
RBC # BLD AUTO: 3.67 MILLION/UL (ref 3.88–5.62)
VIT B12 SERPL-MCNC: 2481 PG/ML (ref 100–900)
WBC # BLD AUTO: 7.87 THOUSAND/UL (ref 4.31–10.16)

## 2017-07-24 PROCEDURE — 36415 COLL VENOUS BLD VENIPUNCTURE: CPT

## 2017-07-24 PROCEDURE — 85025 COMPLETE CBC W/AUTO DIFF WBC: CPT

## 2017-07-24 PROCEDURE — 82746 ASSAY OF FOLIC ACID SERUM: CPT

## 2017-07-24 PROCEDURE — 88184 FLOWCYTOMETRY/ TC 1 MARKER: CPT

## 2017-07-24 PROCEDURE — 88185 FLOWCYTOMETRY/TC ADD-ON: CPT

## 2017-07-24 PROCEDURE — 82607 VITAMIN B-12: CPT

## 2017-07-25 ENCOUNTER — GENERIC CONVERSION - ENCOUNTER (OUTPATIENT)
Dept: OTHER | Facility: OTHER | Age: 62
End: 2017-07-25

## 2017-07-26 LAB — SCAN RESULT: NORMAL

## 2017-07-29 LAB — MISCELLANEOUS LAB TEST RESULT: NORMAL

## 2017-08-07 ENCOUNTER — GENERIC CONVERSION - ENCOUNTER (OUTPATIENT)
Dept: OTHER | Facility: OTHER | Age: 62
End: 2017-08-07

## 2017-08-07 ENCOUNTER — LAB (OUTPATIENT)
Dept: LAB | Facility: MEDICAL CENTER | Age: 62
End: 2017-08-07
Payer: COMMERCIAL

## 2017-08-07 DIAGNOSIS — D58.9 HEREDITARY HEMOLYTIC ANEMIA, UNSPECIFIED (HCC): ICD-10-CM

## 2017-08-07 LAB
BASOPHILS # BLD AUTO: 0.09 THOUSANDS/ΜL (ref 0–0.1)
BASOPHILS NFR BLD AUTO: 1 % (ref 0–1)
EOSINOPHIL # BLD AUTO: 0.54 THOUSAND/ΜL (ref 0–0.61)
EOSINOPHIL NFR BLD AUTO: 5 % (ref 0–6)
ERYTHROCYTE [DISTWIDTH] IN BLOOD BY AUTOMATED COUNT: 15.5 % (ref 11.6–15.1)
HCT VFR BLD AUTO: 36.7 % (ref 36.5–49.3)
HGB BLD-MCNC: 12.1 G/DL (ref 12–17)
LYMPHOCYTES # BLD AUTO: 5.59 THOUSANDS/ΜL (ref 0.6–4.47)
LYMPHOCYTES NFR BLD AUTO: 47 % (ref 14–44)
MCH RBC QN AUTO: 36.2 PG (ref 26.8–34.3)
MCHC RBC AUTO-ENTMCNC: 33 G/DL (ref 31.4–37.4)
MCV RBC AUTO: 110 FL (ref 82–98)
MONOCYTES # BLD AUTO: 1.32 THOUSAND/ΜL (ref 0.17–1.22)
MONOCYTES NFR BLD AUTO: 11 % (ref 4–12)
NEUTROPHILS # BLD AUTO: 4.34 THOUSANDS/ΜL (ref 1.85–7.62)
NEUTS SEG NFR BLD AUTO: 36 % (ref 43–75)
NRBC BLD AUTO-RTO: 1 /100 WBCS
PLATELET # BLD AUTO: 450 THOUSANDS/UL (ref 149–390)
PMV BLD AUTO: 10.4 FL (ref 8.9–12.7)
RBC # BLD AUTO: 3.34 MILLION/UL (ref 3.88–5.62)
WBC # BLD AUTO: 11.91 THOUSAND/UL (ref 4.31–10.16)

## 2017-08-07 PROCEDURE — 36415 COLL VENOUS BLD VENIPUNCTURE: CPT

## 2017-08-07 PROCEDURE — 85025 COMPLETE CBC W/AUTO DIFF WBC: CPT

## 2017-08-14 ENCOUNTER — GENERIC CONVERSION - ENCOUNTER (OUTPATIENT)
Dept: OTHER | Facility: OTHER | Age: 62
End: 2017-08-14

## 2017-08-14 ENCOUNTER — TRANSCRIBE ORDERS (OUTPATIENT)
Dept: ADMINISTRATIVE | Facility: HOSPITAL | Age: 62
End: 2017-08-14

## 2017-08-14 ENCOUNTER — APPOINTMENT (OUTPATIENT)
Dept: LAB | Facility: MEDICAL CENTER | Age: 62
End: 2017-08-14
Payer: COMMERCIAL

## 2017-08-14 DIAGNOSIS — D58.9 HEREDITARY HEMOLYTIC ANEMIA, UNSPECIFIED (HCC): Primary | ICD-10-CM

## 2017-08-14 DIAGNOSIS — D58.9 HEREDITARY HEMOLYTIC ANEMIA, UNSPECIFIED (HCC): ICD-10-CM

## 2017-08-14 LAB
BASOPHILS # BLD AUTO: 0.12 THOUSANDS/ΜL (ref 0–0.1)
BASOPHILS NFR BLD AUTO: 1 % (ref 0–1)
EOSINOPHIL # BLD AUTO: 0.68 THOUSAND/ΜL (ref 0–0.61)
EOSINOPHIL NFR BLD AUTO: 6 % (ref 0–6)
ERYTHROCYTE [DISTWIDTH] IN BLOOD BY AUTOMATED COUNT: 16.8 % (ref 11.6–15.1)
HCT VFR BLD AUTO: 34.8 % (ref 36.5–49.3)
HGB BLD-MCNC: 11.5 G/DL (ref 12–17)
LYMPHOCYTES # BLD AUTO: 5.04 THOUSANDS/ΜL (ref 0.6–4.47)
LYMPHOCYTES NFR BLD AUTO: 48 % (ref 14–44)
MCH RBC QN AUTO: 37.8 PG (ref 26.8–34.3)
MCHC RBC AUTO-ENTMCNC: 33 G/DL (ref 31.4–37.4)
MCV RBC AUTO: 115 FL (ref 82–98)
MONOCYTES # BLD AUTO: 0.94 THOUSAND/ΜL (ref 0.17–1.22)
MONOCYTES NFR BLD AUTO: 9 % (ref 4–12)
NEUTROPHILS # BLD AUTO: 3.88 THOUSANDS/ΜL (ref 1.85–7.62)
NEUTS SEG NFR BLD AUTO: 36 % (ref 43–75)
NRBC BLD AUTO-RTO: 1 /100 WBCS
PLATELET # BLD AUTO: 530 THOUSANDS/UL (ref 149–390)
PMV BLD AUTO: 10.5 FL (ref 8.9–12.7)
RBC # BLD AUTO: 3.04 MILLION/UL (ref 3.88–5.62)
WBC # BLD AUTO: 10.73 THOUSAND/UL (ref 4.31–10.16)

## 2017-08-14 PROCEDURE — 85025 COMPLETE CBC W/AUTO DIFF WBC: CPT

## 2017-08-14 PROCEDURE — 36415 COLL VENOUS BLD VENIPUNCTURE: CPT

## 2017-08-25 ENCOUNTER — GENERIC CONVERSION - ENCOUNTER (OUTPATIENT)
Dept: OTHER | Facility: OTHER | Age: 62
End: 2017-08-25

## 2017-08-25 DIAGNOSIS — M79.662 PAIN OF LEFT LOWER LEG: ICD-10-CM

## 2017-08-25 DIAGNOSIS — M79.661 PAIN OF RIGHT LOWER LEG: ICD-10-CM

## 2017-08-25 DIAGNOSIS — D59.19 OTHER AUTOIMMUNE HEMOLYTIC ANEMIAS: ICD-10-CM

## 2017-08-26 ENCOUNTER — TRANSCRIBE ORDERS (OUTPATIENT)
Dept: ADMINISTRATIVE | Facility: HOSPITAL | Age: 62
End: 2017-08-26

## 2017-08-26 ENCOUNTER — APPOINTMENT (OUTPATIENT)
Dept: LAB | Facility: HOSPITAL | Age: 62
End: 2017-08-26
Attending: INTERNAL MEDICINE
Payer: COMMERCIAL

## 2017-08-26 DIAGNOSIS — D59.19 ANTIBODY-MEDIATED ANEMIA (HCC): Primary | ICD-10-CM

## 2017-08-26 DIAGNOSIS — D59.19 ANTIBODY-MEDIATED ANEMIA (HCC): ICD-10-CM

## 2017-08-26 DIAGNOSIS — D59.19 OTHER AUTOIMMUNE HEMOLYTIC ANEMIAS: ICD-10-CM

## 2017-08-26 LAB
BASOPHILS # BLD AUTO: 0.07 THOUSANDS/ΜL (ref 0–0.1)
BASOPHILS NFR BLD AUTO: 1 % (ref 0–1)
EOSINOPHIL # BLD AUTO: 0.22 THOUSAND/ΜL (ref 0–0.61)
EOSINOPHIL NFR BLD AUTO: 2 % (ref 0–6)
ERYTHROCYTE [DISTWIDTH] IN BLOOD BY AUTOMATED COUNT: 18 % (ref 11.6–15.1)
HCT VFR BLD AUTO: 28.3 % (ref 36.5–49.3)
HGB BLD-MCNC: 10.3 G/DL (ref 12–17)
LDH SERPL-CCNC: 375 U/L (ref 81–234)
LYMPHOCYTES # BLD AUTO: 4.3 THOUSANDS/ΜL (ref 0.6–4.47)
LYMPHOCYTES NFR BLD AUTO: 46 % (ref 14–44)
MCH RBC QN AUTO: 44.2 PG (ref 26.8–34.3)
MCHC RBC AUTO-ENTMCNC: 36.4 G/DL (ref 31.4–37.4)
MCV RBC AUTO: 122 FL (ref 82–98)
MONOCYTES # BLD AUTO: 1.01 THOUSAND/ΜL (ref 0.17–1.22)
MONOCYTES NFR BLD AUTO: 11 % (ref 4–12)
NEUTROPHILS # BLD AUTO: 3.7 THOUSANDS/ΜL (ref 1.85–7.62)
NEUTS SEG NFR BLD AUTO: 40 % (ref 43–75)
PLATELET # BLD AUTO: 597 THOUSANDS/UL (ref 149–390)
PMV BLD AUTO: 10 FL (ref 8.9–12.7)
RBC # BLD AUTO: 2.33 MILLION/UL (ref 3.88–5.62)
WBC # BLD AUTO: 9.3 THOUSAND/UL (ref 4.31–10.16)

## 2017-08-26 PROCEDURE — 85025 COMPLETE CBC W/AUTO DIFF WBC: CPT

## 2017-08-26 PROCEDURE — 83615 LACTATE (LD) (LDH) ENZYME: CPT

## 2017-08-26 PROCEDURE — 86850 RBC ANTIBODY SCREEN: CPT

## 2017-08-26 PROCEDURE — 36415 COLL VENOUS BLD VENIPUNCTURE: CPT

## 2017-08-26 PROCEDURE — 86901 BLOOD TYPING SEROLOGIC RH(D): CPT

## 2017-08-26 PROCEDURE — 86900 BLOOD TYPING SEROLOGIC ABO: CPT

## 2017-08-28 ENCOUNTER — GENERIC CONVERSION - ENCOUNTER (OUTPATIENT)
Dept: OTHER | Facility: OTHER | Age: 62
End: 2017-08-28

## 2017-08-28 LAB
ABO GROUP BLD: NORMAL
BLD GP AB SCN SERPL QL: POSITIVE
RH BLD: POSITIVE
SPECIMEN EXPIRATION DATE: NORMAL

## 2017-08-29 ENCOUNTER — ALLSCRIPTS OFFICE VISIT (OUTPATIENT)
Dept: OTHER | Facility: OTHER | Age: 62
End: 2017-08-29

## 2017-08-29 ENCOUNTER — TRANSCRIBE ORDERS (OUTPATIENT)
Dept: ADMINISTRATIVE | Facility: HOSPITAL | Age: 62
End: 2017-08-29

## 2017-08-29 DIAGNOSIS — D59.19 ANTIBODY-MEDIATED ANEMIA (HCC): ICD-10-CM

## 2017-08-29 DIAGNOSIS — D58.9 HEREDITARY HEMOLYTIC ANEMIA, UNSPECIFIED (HCC): Primary | ICD-10-CM

## 2017-08-31 ENCOUNTER — ALLSCRIPTS OFFICE VISIT (OUTPATIENT)
Dept: OTHER | Facility: OTHER | Age: 62
End: 2017-08-31

## 2017-09-02 ENCOUNTER — LAB (OUTPATIENT)
Dept: LAB | Facility: MEDICAL CENTER | Age: 62
End: 2017-09-02
Payer: COMMERCIAL

## 2017-09-02 DIAGNOSIS — D58.9 HEREDITARY HEMOLYTIC ANEMIA, UNSPECIFIED (HCC): ICD-10-CM

## 2017-09-02 LAB
BASOPHILS # BLD AUTO: 0.06 THOUSANDS/ΜL (ref 0–0.1)
BASOPHILS NFR BLD AUTO: 1 % (ref 0–1)
EOSINOPHIL # BLD AUTO: 0.32 THOUSAND/ΜL (ref 0–0.61)
EOSINOPHIL NFR BLD AUTO: 3 % (ref 0–6)
ERYTHROCYTE [DISTWIDTH] IN BLOOD BY AUTOMATED COUNT: 16 % (ref 11.6–15.1)
HCT VFR BLD AUTO: 33.4 % (ref 36.5–49.3)
HGB BLD-MCNC: 10.8 G/DL (ref 12–17)
LYMPHOCYTES # BLD AUTO: 5.51 THOUSANDS/ΜL (ref 0.6–4.47)
LYMPHOCYTES NFR BLD AUTO: 48 % (ref 14–44)
MCH RBC QN AUTO: 38.6 PG (ref 26.8–34.3)
MCHC RBC AUTO-ENTMCNC: 32.3 G/DL (ref 31.4–37.4)
MCV RBC AUTO: 119 FL (ref 82–98)
MONOCYTES # BLD AUTO: 0.89 THOUSAND/ΜL (ref 0.17–1.22)
MONOCYTES NFR BLD AUTO: 8 % (ref 4–12)
NEUTROPHILS # BLD AUTO: 4.43 THOUSANDS/ΜL (ref 1.85–7.62)
NEUTS SEG NFR BLD AUTO: 40 % (ref 43–75)
NRBC BLD AUTO-RTO: 0 /100 WBCS
PLATELET # BLD AUTO: 541 THOUSANDS/UL (ref 149–390)
PMV BLD AUTO: 9.9 FL (ref 8.9–12.7)
RBC # BLD AUTO: 2.8 MILLION/UL (ref 3.88–5.62)
WBC # BLD AUTO: 11.24 THOUSAND/UL (ref 4.31–10.16)

## 2017-09-02 PROCEDURE — 36415 COLL VENOUS BLD VENIPUNCTURE: CPT

## 2017-09-02 PROCEDURE — 85025 COMPLETE CBC W/AUTO DIFF WBC: CPT

## 2017-09-06 ENCOUNTER — GENERIC CONVERSION - ENCOUNTER (OUTPATIENT)
Dept: OTHER | Facility: OTHER | Age: 62
End: 2017-09-06

## 2017-09-06 ENCOUNTER — HOSPITAL ENCOUNTER (OUTPATIENT)
Dept: NUCLEAR MEDICINE | Facility: HOSPITAL | Age: 62
Discharge: HOME | End: 2017-09-06
Attending: INTERNAL MEDICINE
Payer: COMMERCIAL

## 2017-09-06 DIAGNOSIS — D59.19 ANTIBODY-MEDIATED ANEMIA (HCC): ICD-10-CM

## 2017-09-06 PROCEDURE — 78215 LVR&SPLEEN IMG STATIC ONLY: CPT

## 2017-09-06 PROCEDURE — A9541 TC99M SULFUR COLLOID: HCPCS

## 2017-09-08 ENCOUNTER — GENERIC CONVERSION - ENCOUNTER (OUTPATIENT)
Dept: OTHER | Facility: OTHER | Age: 62
End: 2017-09-08

## 2017-09-11 ENCOUNTER — LAB (OUTPATIENT)
Dept: LAB | Facility: MEDICAL CENTER | Age: 62
End: 2017-09-11
Payer: COMMERCIAL

## 2017-09-11 ENCOUNTER — GENERIC CONVERSION - ENCOUNTER (OUTPATIENT)
Dept: OTHER | Facility: OTHER | Age: 62
End: 2017-09-11

## 2017-09-11 DIAGNOSIS — D58.9 HEREDITARY HEMOLYTIC ANEMIA, UNSPECIFIED (HCC): ICD-10-CM

## 2017-09-11 LAB
ERYTHROCYTE [DISTWIDTH] IN BLOOD BY AUTOMATED COUNT: 18.2 % (ref 11.6–15.1)
HCT VFR BLD AUTO: 29.4 % (ref 36.5–49.3)
HGB BLD-MCNC: 9.2 G/DL (ref 12–17)
MCH RBC QN AUTO: 39.3 PG (ref 26.8–34.3)
MCHC RBC AUTO-ENTMCNC: 31.3 G/DL (ref 31.4–37.4)
MCV RBC AUTO: 126 FL (ref 82–98)
PLATELET # BLD AUTO: 634 THOUSANDS/UL (ref 149–390)
PMV BLD AUTO: 10 FL (ref 8.9–12.7)
RBC # BLD AUTO: 2.34 MILLION/UL (ref 3.88–5.62)
WBC # BLD AUTO: 13.5 THOUSAND/UL (ref 4.31–10.16)

## 2017-09-11 PROCEDURE — 36415 COLL VENOUS BLD VENIPUNCTURE: CPT

## 2017-09-11 PROCEDURE — 85027 COMPLETE CBC AUTOMATED: CPT

## 2017-09-12 ENCOUNTER — GENERIC CONVERSION - ENCOUNTER (OUTPATIENT)
Dept: OTHER | Facility: OTHER | Age: 62
End: 2017-09-12

## 2017-09-12 ENCOUNTER — HOSPITAL ENCOUNTER (OUTPATIENT)
Dept: NON INVASIVE DIAGNOSTICS | Facility: HOSPITAL | Age: 62
Discharge: HOME | End: 2017-09-12
Attending: INTERNAL MEDICINE
Payer: COMMERCIAL

## 2017-09-12 DIAGNOSIS — M79.662 PAIN OF LEFT LOWER LEG: ICD-10-CM

## 2017-09-12 DIAGNOSIS — M79.661 PAIN OF RIGHT LOWER LEG: ICD-10-CM

## 2017-09-12 PROCEDURE — 93970 EXTREMITY STUDY: CPT

## 2017-09-14 ENCOUNTER — APPOINTMENT (OUTPATIENT)
Dept: LAB | Facility: CLINIC | Age: 62
End: 2017-09-14
Payer: COMMERCIAL

## 2017-09-14 ENCOUNTER — TRANSCRIBE ORDERS (OUTPATIENT)
Dept: LAB | Facility: CLINIC | Age: 62
End: 2017-09-14

## 2017-09-14 ENCOUNTER — GENERIC CONVERSION - ENCOUNTER (OUTPATIENT)
Dept: OTHER | Facility: OTHER | Age: 62
End: 2017-09-14

## 2017-09-14 DIAGNOSIS — D58.9 HEREDITARY HEMOLYTIC ANEMIA, UNSPECIFIED (HCC): Primary | ICD-10-CM

## 2017-09-14 DIAGNOSIS — D58.9 HEREDITARY HEMOLYTIC ANEMIA, UNSPECIFIED (HCC): ICD-10-CM

## 2017-09-14 LAB
ANISOCYTOSIS BLD QL SMEAR: PRESENT
BASOPHILS # BLD MANUAL: 0 THOUSAND/UL (ref 0–0.1)
BASOPHILS NFR MAR MANUAL: 0 % (ref 0–1)
EOSINOPHIL # BLD MANUAL: 0.5 THOUSAND/UL (ref 0–0.4)
EOSINOPHIL NFR BLD MANUAL: 5 % (ref 0–6)
ERYTHROCYTE [DISTWIDTH] IN BLOOD BY AUTOMATED COUNT: 19.9 % (ref 11.6–15.1)
HCT VFR BLD AUTO: 27.9 % (ref 36.5–49.3)
HGB BLD-MCNC: 8.9 G/DL (ref 12–17)
LYMPHOCYTES # BLD AUTO: 2.29 THOUSAND/UL (ref 0.6–4.47)
LYMPHOCYTES # BLD AUTO: 23 % (ref 14–44)
MACROCYTES BLD QL AUTO: PRESENT
MCH RBC QN AUTO: 41 PG (ref 26.8–34.3)
MCHC RBC AUTO-ENTMCNC: 31.9 G/DL (ref 31.4–37.4)
MCV RBC AUTO: 129 FL (ref 82–98)
MONOCYTES # BLD AUTO: 0.8 THOUSAND/UL (ref 0–1.22)
MONOCYTES NFR BLD: 8 % (ref 4–12)
MYELOCYTES NFR BLD MANUAL: 1 % (ref 0–1)
NEUTROPHILS # BLD MANUAL: 6.26 THOUSAND/UL (ref 1.85–7.62)
NEUTS SEG NFR BLD AUTO: 63 % (ref 43–75)
NRBC BLD AUTO-RTO: 18 /100 WBCS
NRBC BLD AUTO-RTO: 25 /100 WBC (ref 0–2)
PLATELET # BLD AUTO: 615 THOUSANDS/UL (ref 149–390)
PLATELET BLD QL SMEAR: ABNORMAL
PMV BLD AUTO: 9.9 FL (ref 8.9–12.7)
POIKILOCYTOSIS BLD QL SMEAR: PRESENT
POLYCHROMASIA BLD QL SMEAR: PRESENT
RBC # BLD AUTO: 2.17 MILLION/UL (ref 3.88–5.62)
RBC MORPH BLD: PRESENT
WBC # BLD AUTO: 9.94 THOUSAND/UL (ref 4.31–10.16)

## 2017-09-14 PROCEDURE — 36415 COLL VENOUS BLD VENIPUNCTURE: CPT

## 2017-09-14 PROCEDURE — 85027 COMPLETE CBC AUTOMATED: CPT

## 2017-09-14 PROCEDURE — 85007 BL SMEAR W/DIFF WBC COUNT: CPT

## 2017-09-18 ENCOUNTER — LAB (OUTPATIENT)
Dept: LAB | Facility: MEDICAL CENTER | Age: 62
End: 2017-09-18
Payer: COMMERCIAL

## 2017-09-18 DIAGNOSIS — D58.9 HEREDITARY HEMOLYTIC ANEMIA, UNSPECIFIED (HCC): ICD-10-CM

## 2017-09-18 DIAGNOSIS — D59.19 OTHER AUTOIMMUNE HEMOLYTIC ANEMIAS: ICD-10-CM

## 2017-09-18 DIAGNOSIS — D59.19 ANTIBODY-MEDIATED ANEMIA (HCC): ICD-10-CM

## 2017-09-18 LAB
ANISOCYTOSIS BLD QL SMEAR: PRESENT
BASOPHILS # BLD MANUAL: 0 THOUSAND/UL (ref 0–0.1)
BASOPHILS NFR MAR MANUAL: 0 % (ref 0–1)
EOSINOPHIL # BLD MANUAL: 0.09 THOUSAND/UL (ref 0–0.4)
EOSINOPHIL NFR BLD MANUAL: 1 % (ref 0–6)
ERYTHROCYTE [DISTWIDTH] IN BLOOD BY AUTOMATED COUNT: 19.9 % (ref 11.6–15.1)
FERRITIN SERPL-MCNC: 1104 NG/ML (ref 8–388)
FOLATE SERPL-MCNC: >20 NG/ML (ref 3.1–17.5)
HCT VFR BLD AUTO: 31.7 % (ref 36.5–49.3)
HGB BLD-MCNC: 10.3 G/DL (ref 12–17)
IRON SATN MFR SERPL: 41 %
IRON SERPL-MCNC: 146 UG/DL (ref 65–175)
LG PLATELETS BLD QL SMEAR: PRESENT
LYMPHOCYTES # BLD AUTO: 3.4 THOUSAND/UL (ref 0.6–4.47)
LYMPHOCYTES # BLD AUTO: 37 % (ref 14–44)
MACROCYTES BLD QL AUTO: PRESENT
MCH RBC QN AUTO: 41.7 PG (ref 26.8–34.3)
MCHC RBC AUTO-ENTMCNC: 32.5 G/DL (ref 31.4–37.4)
MCV RBC AUTO: 128 FL (ref 82–98)
MONOCYTES # BLD AUTO: 0.28 THOUSAND/UL (ref 0–1.22)
MONOCYTES NFR BLD: 3 % (ref 4–12)
NEUTROPHILS # BLD MANUAL: 5.05 THOUSAND/UL (ref 1.85–7.62)
NEUTS BAND NFR BLD MANUAL: 4 % (ref 0–8)
NEUTS SEG NFR BLD AUTO: 51 % (ref 43–75)
NRBC BLD AUTO-RTO: 5 /100 WBCS
NRBC BLD AUTO-RTO: 8 /100 WBC (ref 0–2)
PLATELET # BLD AUTO: 545 THOUSANDS/UL (ref 149–390)
PLATELET BLD QL SMEAR: ABNORMAL
PMV BLD AUTO: 10 FL (ref 8.9–12.7)
POLYCHROMASIA BLD QL SMEAR: PRESENT
RBC # BLD AUTO: 2.47 MILLION/UL (ref 3.88–5.62)
RBC MORPH BLD: PRESENT
TIBC SERPL-MCNC: 358 UG/DL (ref 250–450)
VARIANT LYMPHS # BLD AUTO: 4 %
VIT B12 SERPL-MCNC: 1170 PG/ML (ref 100–900)
WBC # BLD AUTO: 9.18 THOUSAND/UL (ref 4.31–10.16)

## 2017-09-18 PROCEDURE — 83540 ASSAY OF IRON: CPT

## 2017-09-18 PROCEDURE — 36415 COLL VENOUS BLD VENIPUNCTURE: CPT

## 2017-09-18 PROCEDURE — 82607 VITAMIN B-12: CPT

## 2017-09-18 PROCEDURE — 85007 BL SMEAR W/DIFF WBC COUNT: CPT

## 2017-09-18 PROCEDURE — 82668 ASSAY OF ERYTHROPOIETIN: CPT

## 2017-09-18 PROCEDURE — 82746 ASSAY OF FOLIC ACID SERUM: CPT

## 2017-09-18 PROCEDURE — 82728 ASSAY OF FERRITIN: CPT

## 2017-09-18 PROCEDURE — 83550 IRON BINDING TEST: CPT

## 2017-09-18 PROCEDURE — 85027 COMPLETE CBC AUTOMATED: CPT

## 2017-09-19 ENCOUNTER — GENERIC CONVERSION - ENCOUNTER (OUTPATIENT)
Dept: OTHER | Facility: OTHER | Age: 62
End: 2017-09-19

## 2017-09-19 LAB — EPO SERPL-ACNC: 105.7 MIU/ML (ref 2.6–18.5)

## 2017-09-21 RX ORDER — SODIUM CHLORIDE 9 MG/ML
20 INJECTION, SOLUTION INTRAVENOUS CONTINUOUS
Status: DISCONTINUED | OUTPATIENT
Start: 2017-09-22 | End: 2017-09-22 | Stop reason: HOSPADM

## 2017-09-21 RX ORDER — ACETAMINOPHEN 325 MG/1
650 TABLET ORAL ONCE
Status: DISCONTINUED | OUTPATIENT
Start: 2017-09-22 | End: 2017-09-22 | Stop reason: HOSPADM

## 2017-09-22 ENCOUNTER — HOSPITAL ENCOUNTER (OUTPATIENT)
Dept: INFUSION CENTER | Facility: CLINIC | Age: 62
Discharge: HOME | End: 2017-09-22
Payer: COMMERCIAL

## 2017-09-22 VITALS
TEMPERATURE: 98.7 F | DIASTOLIC BLOOD PRESSURE: 71 MMHG | HEART RATE: 75 BPM | SYSTOLIC BLOOD PRESSURE: 146 MMHG | RESPIRATION RATE: 18 BRPM

## 2017-09-25 ENCOUNTER — GENERIC CONVERSION - ENCOUNTER (OUTPATIENT)
Dept: OTHER | Facility: OTHER | Age: 62
End: 2017-09-25

## 2017-09-25 ENCOUNTER — LAB (OUTPATIENT)
Dept: LAB | Facility: MEDICAL CENTER | Age: 62
End: 2017-09-25
Payer: COMMERCIAL

## 2017-09-25 DIAGNOSIS — D58.9 HEREDITARY HEMOLYTIC ANEMIA, UNSPECIFIED (HCC): ICD-10-CM

## 2017-09-25 LAB
BASOPHILS # BLD AUTO: 0.05 THOUSANDS/ΜL (ref 0–0.1)
BASOPHILS NFR BLD AUTO: 1 % (ref 0–1)
EOSINOPHIL # BLD AUTO: 0.17 THOUSAND/ΜL (ref 0–0.61)
EOSINOPHIL NFR BLD AUTO: 2 % (ref 0–6)
ERYTHROCYTE [DISTWIDTH] IN BLOOD BY AUTOMATED COUNT: 15.2 % (ref 11.6–15.1)
HCT VFR BLD AUTO: 32 % (ref 36.5–49.3)
HGB BLD-MCNC: 10.4 G/DL (ref 12–17)
LYMPHOCYTES # BLD AUTO: 2.17 THOUSANDS/ΜL (ref 0.6–4.47)
LYMPHOCYTES NFR BLD AUTO: 22 % (ref 14–44)
MCH RBC QN AUTO: 39.8 PG (ref 26.8–34.3)
MCHC RBC AUTO-ENTMCNC: 32.5 G/DL (ref 31.4–37.4)
MCV RBC AUTO: 123 FL (ref 82–98)
MONOCYTES # BLD AUTO: 0.95 THOUSAND/ΜL (ref 0.17–1.22)
MONOCYTES NFR BLD AUTO: 9 % (ref 4–12)
NEUTROPHILS # BLD AUTO: 6.69 THOUSANDS/ΜL (ref 1.85–7.62)
NEUTS SEG NFR BLD AUTO: 66 % (ref 43–75)
NRBC BLD AUTO-RTO: 0 /100 WBCS
PLATELET # BLD AUTO: 527 THOUSANDS/UL (ref 149–390)
PMV BLD AUTO: 10 FL (ref 8.9–12.7)
RBC # BLD AUTO: 2.61 MILLION/UL (ref 3.88–5.62)
WBC # BLD AUTO: 10.06 THOUSAND/UL (ref 4.31–10.16)

## 2017-09-25 PROCEDURE — 85025 COMPLETE CBC W/AUTO DIFF WBC: CPT

## 2017-09-25 PROCEDURE — 36415 COLL VENOUS BLD VENIPUNCTURE: CPT

## 2017-09-28 RX ORDER — ACETAMINOPHEN 325 MG/1
650 TABLET ORAL ONCE
Status: COMPLETED | OUTPATIENT
Start: 2017-09-29 | End: 2017-09-29

## 2017-09-28 RX ORDER — SODIUM CHLORIDE 9 MG/ML
20 INJECTION, SOLUTION INTRAVENOUS CONTINUOUS
Status: DISCONTINUED | OUTPATIENT
Start: 2017-09-29 | End: 2017-10-02 | Stop reason: HOSPADM

## 2017-09-29 ENCOUNTER — HOSPITAL ENCOUNTER (OUTPATIENT)
Dept: INFUSION CENTER | Facility: CLINIC | Age: 62
Discharge: HOME | End: 2017-09-29
Payer: COMMERCIAL

## 2017-09-29 VITALS
WEIGHT: 186.95 LBS | HEIGHT: 68 IN | TEMPERATURE: 98.4 F | DIASTOLIC BLOOD PRESSURE: 66 MMHG | HEART RATE: 60 BPM | RESPIRATION RATE: 18 BRPM | SYSTOLIC BLOOD PRESSURE: 128 MMHG | BODY MASS INDEX: 28.33 KG/M2

## 2017-09-29 PROCEDURE — 96413 CHEMO IV INFUSION 1 HR: CPT

## 2017-09-29 PROCEDURE — 96375 TX/PRO/DX INJ NEW DRUG ADDON: CPT

## 2017-09-29 PROCEDURE — 96415 CHEMO IV INFUSION ADDL HR: CPT

## 2017-09-29 RX ORDER — PREDNISONE 20 MG/1
10 TABLET ORAL DAILY
COMMUNITY
End: 2017-11-09 | Stop reason: HOSPADM

## 2017-09-29 RX ADMIN — DIPHENHYDRAMINE HYDROCHLORIDE 25 MG: 50 INJECTION, SOLUTION INTRAMUSCULAR; INTRAVENOUS at 08:20

## 2017-09-29 RX ADMIN — SODIUM CHLORIDE 20 ML/HR: 0.9 INJECTION, SOLUTION INTRAVENOUS at 08:15

## 2017-09-29 RX ADMIN — ACETAMINOPHEN 650 MG: 325 TABLET, FILM COATED ORAL at 08:19

## 2017-09-29 RX ADMIN — RITUXIMAB 739 MG: 10 INJECTION, SOLUTION INTRAVENOUS at 09:08

## 2017-09-29 NOTE — PLAN OF CARE
Problem: Potential for Falls  Goal: Patient will remain free of falls  INTERVENTIONS:  - Assess patient frequently for physical needs  -  Identify cognitive and physical deficits and behaviors that affect risk of falls    -  [place] fall precautions as indicated by assessment   - Educate patient/family on patient safety including physical limitations  - Instruct patient to call for assistance with activity based on assessment  - Modify environment to reduce risk of injury  - Consider OT/PT consult to assist with strengthening/mobility   Outcome: Progressing

## 2017-09-29 NOTE — PROGRESS NOTES
Patient tolerated Rituxan infusion well with no complications  Pt denies any discomfort  Pt is aware of next appoinment

## 2017-10-02 ENCOUNTER — APPOINTMENT (OUTPATIENT)
Dept: LAB | Facility: MEDICAL CENTER | Age: 62
End: 2017-10-02
Payer: COMMERCIAL

## 2017-10-02 ENCOUNTER — TRANSCRIBE ORDERS (OUTPATIENT)
Dept: ADMINISTRATIVE | Facility: HOSPITAL | Age: 62
End: 2017-10-02

## 2017-10-02 DIAGNOSIS — D58.9 HEREDITARY HEMOLYTIC ANEMIA, UNSPECIFIED (HCC): ICD-10-CM

## 2017-10-02 DIAGNOSIS — D58.9: ICD-10-CM

## 2017-10-02 DIAGNOSIS — D58.9 HEREDITARY HEMOLYTIC ANEMIA, UNSPECIFIED (HCC): Primary | ICD-10-CM

## 2017-10-02 LAB
BASOPHILS # BLD AUTO: 0.07 THOUSANDS/ΜL (ref 0–0.1)
BASOPHILS NFR BLD AUTO: 1 % (ref 0–1)
EOSINOPHIL # BLD AUTO: 0.18 THOUSAND/ΜL (ref 0–0.61)
EOSINOPHIL NFR BLD AUTO: 2 % (ref 0–6)
ERYTHROCYTE [DISTWIDTH] IN BLOOD BY AUTOMATED COUNT: 15.7 % (ref 11.6–15.1)
HCT VFR BLD AUTO: 31.8 % (ref 36.5–49.3)
HGB BLD-MCNC: 10.4 G/DL (ref 12–17)
LYMPHOCYTES # BLD AUTO: 3.23 THOUSANDS/ΜL (ref 0.6–4.47)
LYMPHOCYTES NFR BLD AUTO: 27 % (ref 14–44)
MCH RBC QN AUTO: 40.8 PG (ref 26.8–34.3)
MCHC RBC AUTO-ENTMCNC: 32.7 G/DL (ref 31.4–37.4)
MCV RBC AUTO: 125 FL (ref 82–98)
MONOCYTES # BLD AUTO: 1.01 THOUSAND/ΜL (ref 0.17–1.22)
MONOCYTES NFR BLD AUTO: 8 % (ref 4–12)
NEUTROPHILS # BLD AUTO: 7.59 THOUSANDS/ΜL (ref 1.85–7.62)
NEUTS SEG NFR BLD AUTO: 62 % (ref 43–75)
NRBC BLD AUTO-RTO: 3 /100 WBCS
PLATELET # BLD AUTO: 483 THOUSANDS/UL (ref 149–390)
PMV BLD AUTO: 10 FL (ref 8.9–12.7)
RBC # BLD AUTO: 2.55 MILLION/UL (ref 3.88–5.62)
WBC # BLD AUTO: 12.13 THOUSAND/UL (ref 4.31–10.16)

## 2017-10-02 PROCEDURE — 36415 COLL VENOUS BLD VENIPUNCTURE: CPT

## 2017-10-02 PROCEDURE — 85025 COMPLETE CBC W/AUTO DIFF WBC: CPT

## 2017-10-05 RX ORDER — SODIUM CHLORIDE 9 MG/ML
20 INJECTION, SOLUTION INTRAVENOUS CONTINUOUS
Status: DISCONTINUED | OUTPATIENT
Start: 2017-10-06 | End: 2017-10-09 | Stop reason: HOSPADM

## 2017-10-05 RX ORDER — ACETAMINOPHEN 325 MG/1
650 TABLET ORAL ONCE
Status: COMPLETED | OUTPATIENT
Start: 2017-10-06 | End: 2017-10-06

## 2017-10-06 ENCOUNTER — HOSPITAL ENCOUNTER (OUTPATIENT)
Dept: INFUSION CENTER | Facility: CLINIC | Age: 62
Discharge: HOME | End: 2017-10-06
Payer: COMMERCIAL

## 2017-10-06 VITALS
RESPIRATION RATE: 18 BRPM | WEIGHT: 189.15 LBS | SYSTOLIC BLOOD PRESSURE: 135 MMHG | HEIGHT: 68 IN | TEMPERATURE: 97.6 F | BODY MASS INDEX: 28.67 KG/M2 | HEART RATE: 68 BPM | DIASTOLIC BLOOD PRESSURE: 81 MMHG

## 2017-10-06 PROCEDURE — 96415 CHEMO IV INFUSION ADDL HR: CPT

## 2017-10-06 PROCEDURE — 96413 CHEMO IV INFUSION 1 HR: CPT

## 2017-10-06 PROCEDURE — 96367 TX/PROPH/DG ADDL SEQ IV INF: CPT

## 2017-10-06 RX ADMIN — DIPHENHYDRAMINE HYDROCHLORIDE 25 MG: 50 INJECTION, SOLUTION INTRAMUSCULAR; INTRAVENOUS at 08:50

## 2017-10-06 RX ADMIN — SODIUM CHLORIDE 20 ML/HR: 0.9 INJECTION, SOLUTION INTRAVENOUS at 08:49

## 2017-10-06 RX ADMIN — ACETAMINOPHEN 650 MG: 325 TABLET, FILM COATED ORAL at 08:49

## 2017-10-06 RX ADMIN — RITUXIMAB 739 MG: 10 INJECTION, SOLUTION INTRAVENOUS at 09:41

## 2017-10-06 NOTE — PLAN OF CARE
Problem: Potential for Falls  Goal: Patient will remain free of falls  INTERVENTIONS:  - Assess patient frequently for physical needs  -  Identify cognitive and physical deficits and behaviors that affect risk of falls    -  [place] fall precautions as indicated by assessment   - Educate patient/family on patient safety including physical limitations  - Instruct patient to call for assistance with activity based on assessment  - Modify environment to reduce risk of injury  - Consider OT/PT consult to assist with strengthening/mobility   Outcome: Progressing

## 2017-10-12 RX ORDER — ACETAMINOPHEN 325 MG/1
650 TABLET ORAL ONCE
Status: COMPLETED | OUTPATIENT
Start: 2017-10-13 | End: 2017-10-13

## 2017-10-12 RX ORDER — SODIUM CHLORIDE 9 MG/ML
20 INJECTION, SOLUTION INTRAVENOUS CONTINUOUS
Status: DISCONTINUED | OUTPATIENT
Start: 2017-10-13 | End: 2017-10-16 | Stop reason: HOSPADM

## 2017-10-13 ENCOUNTER — HOSPITAL ENCOUNTER (OUTPATIENT)
Dept: INFUSION CENTER | Facility: CLINIC | Age: 62
Discharge: HOME | End: 2017-10-13
Payer: COMMERCIAL

## 2017-10-13 VITALS
RESPIRATION RATE: 18 BRPM | TEMPERATURE: 97.9 F | HEIGHT: 68 IN | WEIGHT: 189.71 LBS | SYSTOLIC BLOOD PRESSURE: 129 MMHG | DIASTOLIC BLOOD PRESSURE: 79 MMHG | HEART RATE: 74 BPM | BODY MASS INDEX: 28.75 KG/M2

## 2017-10-13 LAB
ANISOCYTOSIS BLD QL SMEAR: PRESENT
BASOPHILS # BLD AUTO: 0 THOUSAND/UL (ref 0–0.1)
BASOPHILS NFR MAR MANUAL: 0 % (ref 0–1)
EOSINOPHIL # BLD AUTO: 0.14 THOUSAND/UL (ref 0–0.61)
EOSINOPHIL NFR BLD MANUAL: 1 % (ref 0–6)
ERYTHROCYTE [DISTWIDTH] IN BLOOD BY AUTOMATED COUNT: 17.8 % (ref 11.6–15.1)
HCT VFR BLD AUTO: 33.1 % (ref 36.5–49.3)
HGB BLD-MCNC: 10.7 G/DL (ref 12–17)
LG PLATELETS BLD QL SMEAR: PRESENT
LYMPHOCYTES # BLD AUTO: 15 % (ref 14–44)
LYMPHOCYTES # BLD AUTO: 2.03 THOUSAND/UL (ref 0.6–4.47)
MCH RBC QN AUTO: 38.7 PG (ref 26.8–34.3)
MCHC RBC AUTO-ENTMCNC: 32.2 G/DL (ref 31.4–37.4)
MCV RBC AUTO: 120 FL (ref 82–98)
MONOCYTES # BLD AUTO: 0.54 THOUSAND/UL (ref 0–1.22)
MONOCYTES NFR BLD AUTO: 4 % (ref 4–12)
NEUTS BAND NFR BLD MANUAL: 0 % (ref 0–8)
NEUTS SEG # BLD: 10.8 THOUSAND/UL (ref 1.81–6.82)
NEUTS SEG NFR BLD AUTO: 80 % (ref 43–75)
PLATELET # BLD AUTO: 558 THOUSANDS/UL (ref 149–390)
PLATELET BLD QL SMEAR: ABNORMAL
PMV BLD AUTO: 7.8 FL (ref 8.9–12.7)
POLYCHROMASIA BLD QL SMEAR: PRESENT
RBC # BLD AUTO: 2.76 MILLION/UL (ref 3.88–5.62)
TOTAL CELLS COUNTED SPEC: 100
WBC # BLD AUTO: 13.5 THOUSAND/UL (ref 4.31–10.16)
WBC NRBC COR # BLD: 13.5 THOUSAND/UL (ref 4.31–10.16)

## 2017-10-13 PROCEDURE — 96415 CHEMO IV INFUSION ADDL HR: CPT

## 2017-10-13 PROCEDURE — 85007 BL SMEAR W/DIFF WBC COUNT: CPT | Performed by: INTERNAL MEDICINE

## 2017-10-13 PROCEDURE — 85027 COMPLETE CBC AUTOMATED: CPT | Performed by: INTERNAL MEDICINE

## 2017-10-13 PROCEDURE — 96367 TX/PROPH/DG ADDL SEQ IV INF: CPT

## 2017-10-13 PROCEDURE — 96413 CHEMO IV INFUSION 1 HR: CPT

## 2017-10-13 RX ADMIN — DIPHENHYDRAMINE HYDROCHLORIDE 25 MG: 50 INJECTION, SOLUTION INTRAMUSCULAR; INTRAVENOUS at 09:16

## 2017-10-13 RX ADMIN — ACETAMINOPHEN 650 MG: 325 TABLET, FILM COATED ORAL at 08:48

## 2017-10-13 RX ADMIN — SODIUM CHLORIDE 20 ML/HR: 0.9 INJECTION, SOLUTION INTRAVENOUS at 09:16

## 2017-10-13 RX ADMIN — RITUXIMAB 739 MG: 10 INJECTION, SOLUTION INTRAVENOUS at 10:16

## 2017-10-13 NOTE — PLAN OF CARE
Problem: Potential for Falls  Goal: Patient will remain free of falls  INTERVENTIONS:  - Assess patient frequently for physical needs  -  Identify cognitive and physical deficits and behaviors that affect risk of falls    -  [place] fall precautions as indicated by assessment   - Educate patient/family on patient safety including physical limitations  - Instruct patient to call for assistance with activity based on assessment  - Modify environment to reduce risk of injury  - Consider OT/PT consult to assist with strengthening/mobility   Outcome: Progressing

## 2017-10-17 ENCOUNTER — GENERIC CONVERSION - ENCOUNTER (OUTPATIENT)
Dept: OTHER | Facility: OTHER | Age: 62
End: 2017-10-17

## 2017-10-19 RX ORDER — ACETAMINOPHEN 325 MG/1
650 TABLET ORAL ONCE
Status: COMPLETED | OUTPATIENT
Start: 2017-10-20 | End: 2017-10-20

## 2017-10-19 RX ORDER — SODIUM CHLORIDE 9 MG/ML
20 INJECTION, SOLUTION INTRAVENOUS CONTINUOUS
Status: DISCONTINUED | OUTPATIENT
Start: 2017-10-20 | End: 2017-10-23 | Stop reason: HOSPADM

## 2017-10-20 ENCOUNTER — HOSPITAL ENCOUNTER (OUTPATIENT)
Dept: INFUSION CENTER | Facility: CLINIC | Age: 62
Discharge: HOME | End: 2017-10-20
Payer: COMMERCIAL

## 2017-10-20 VITALS
TEMPERATURE: 97.8 F | HEART RATE: 83 BPM | RESPIRATION RATE: 18 BRPM | DIASTOLIC BLOOD PRESSURE: 83 MMHG | SYSTOLIC BLOOD PRESSURE: 140 MMHG

## 2017-10-20 LAB
ANISOCYTOSIS BLD QL SMEAR: PRESENT
BASOPHILS # BLD AUTO: 0 THOUSAND/UL (ref 0–0.1)
BASOPHILS NFR MAR MANUAL: 0 % (ref 0–1)
EOSINOPHIL # BLD AUTO: 0.28 THOUSAND/UL (ref 0–0.61)
EOSINOPHIL NFR BLD MANUAL: 2 % (ref 0–6)
ERYTHROCYTE [DISTWIDTH] IN BLOOD BY AUTOMATED COUNT: 19.6 % (ref 11.6–15.1)
HCT VFR BLD AUTO: 33.6 % (ref 36.5–49.3)
HGB BLD-MCNC: 10.7 G/DL (ref 12–17)
LG PLATELETS BLD QL SMEAR: PRESENT
LYMPHOCYTES # BLD AUTO: 33 % (ref 14–44)
LYMPHOCYTES # BLD AUTO: 4.62 THOUSAND/UL (ref 0.6–4.47)
MACROCYTES BLD QL AUTO: PRESENT
MCH RBC QN AUTO: 39.5 PG (ref 26.8–34.3)
MCHC RBC AUTO-ENTMCNC: 31.9 G/DL (ref 31.4–37.4)
MCV RBC AUTO: 124 FL (ref 82–98)
METAMYELOCYTES NFR BLD MANUAL: 3 % (ref 0–1)
MONOCYTES # BLD AUTO: 0.84 THOUSAND/UL (ref 0–1.22)
MONOCYTES NFR BLD AUTO: 6 % (ref 4–12)
MYELOCYTES NFR BLD MANUAL: 1 % (ref 0–1)
NEUTS BAND NFR BLD MANUAL: 0 % (ref 0–8)
NEUTS SEG # BLD: 7.7 THOUSAND/UL (ref 1.81–6.82)
NEUTS SEG NFR BLD AUTO: 55 % (ref 43–75)
OVALOCYTES BLD QL SMEAR: PRESENT
PLATELET # BLD AUTO: 520 THOUSANDS/UL (ref 149–390)
PLATELET BLD QL SMEAR: ABNORMAL
PMV BLD AUTO: 7.6 FL (ref 8.9–12.7)
POLYCHROMASIA BLD QL SMEAR: PRESENT
RBC # BLD AUTO: 2.71 MILLION/UL (ref 3.88–5.62)
TOTAL CELLS COUNTED SPEC: 100
WBC # BLD AUTO: 14 THOUSAND/UL (ref 4.31–10.16)
WBC NRBC COR # BLD: 14 THOUSAND/UL (ref 4.31–10.16)

## 2017-10-20 PROCEDURE — 96415 CHEMO IV INFUSION ADDL HR: CPT

## 2017-10-20 PROCEDURE — 96367 TX/PROPH/DG ADDL SEQ IV INF: CPT

## 2017-10-20 PROCEDURE — 96413 CHEMO IV INFUSION 1 HR: CPT

## 2017-10-20 PROCEDURE — 85007 BL SMEAR W/DIFF WBC COUNT: CPT | Performed by: INTERNAL MEDICINE

## 2017-10-20 PROCEDURE — 85027 COMPLETE CBC AUTOMATED: CPT | Performed by: INTERNAL MEDICINE

## 2017-10-20 RX ADMIN — SODIUM CHLORIDE 20 ML/HR: 0.9 INJECTION, SOLUTION INTRAVENOUS at 08:57

## 2017-10-20 RX ADMIN — ACETAMINOPHEN 650 MG: 325 TABLET, FILM COATED ORAL at 08:58

## 2017-10-20 RX ADMIN — DIPHENHYDRAMINE HYDROCHLORIDE 25 MG: 50 INJECTION, SOLUTION INTRAMUSCULAR; INTRAVENOUS at 08:57

## 2017-10-20 RX ADMIN — RITUXIMAB 739 MG: 10 INJECTION, SOLUTION INTRAVENOUS at 09:51

## 2017-10-20 NOTE — PLAN OF CARE
Problem: Potential for Falls  Goal: Patient will remain free of falls  INTERVENTIONS:  - Assess patient frequently for physical needs  -  Identify cognitive and physical deficits and behaviors that affect risk of falls    -  [place] fall precautions as indicated by assessment   - Educate patient/family on patient safety including physical limitations  - Instruct patient to call for assistance with activity based on assessment  - Modify environment to reduce risk of injury  - Consider OT/PT consult to assist with strengthening/mobility   Outcome: Progressing

## 2017-10-23 ENCOUNTER — GENERIC CONVERSION - ENCOUNTER (OUTPATIENT)
Dept: OTHER | Facility: OTHER | Age: 62
End: 2017-10-23

## 2017-10-30 ENCOUNTER — GENERIC CONVERSION - ENCOUNTER (OUTPATIENT)
Dept: OTHER | Facility: OTHER | Age: 62
End: 2017-10-30

## 2017-10-30 ENCOUNTER — LAB (OUTPATIENT)
Dept: LAB | Facility: MEDICAL CENTER | Age: 62
End: 2017-10-30
Payer: COMMERCIAL

## 2017-10-30 DIAGNOSIS — D58.9: ICD-10-CM

## 2017-10-30 LAB
BASOPHILS # BLD AUTO: 0.08 THOUSANDS/ΜL (ref 0–0.1)
BASOPHILS NFR BLD AUTO: 1 % (ref 0–1)
EOSINOPHIL # BLD AUTO: 0.68 THOUSAND/ΜL (ref 0–0.61)
EOSINOPHIL NFR BLD AUTO: 5 % (ref 0–6)
ERYTHROCYTE [DISTWIDTH] IN BLOOD BY AUTOMATED COUNT: 14.7 % (ref 11.6–15.1)
HCT VFR BLD AUTO: 33 % (ref 36.5–49.3)
HGB BLD-MCNC: 10.6 G/DL (ref 12–17)
LYMPHOCYTES # BLD AUTO: 3.48 THOUSANDS/ΜL (ref 0.6–4.47)
LYMPHOCYTES NFR BLD AUTO: 27 % (ref 14–44)
MCH RBC QN AUTO: 40.2 PG (ref 26.8–34.3)
MCHC RBC AUTO-ENTMCNC: 32.1 G/DL (ref 31.4–37.4)
MCV RBC AUTO: 125 FL (ref 82–98)
MONOCYTES # BLD AUTO: 1.15 THOUSAND/ΜL (ref 0.17–1.22)
MONOCYTES NFR BLD AUTO: 9 % (ref 4–12)
NEUTROPHILS # BLD AUTO: 7.2 THOUSANDS/ΜL (ref 1.85–7.62)
NEUTS SEG NFR BLD AUTO: 58 % (ref 43–75)
NRBC BLD AUTO-RTO: 2 /100 WBCS
PLATELET # BLD AUTO: 540 THOUSANDS/UL (ref 149–390)
PMV BLD AUTO: 9.8 FL (ref 8.9–12.7)
RBC # BLD AUTO: 2.64 MILLION/UL (ref 3.88–5.62)
WBC # BLD AUTO: 12.68 THOUSAND/UL (ref 4.31–10.16)

## 2017-10-30 PROCEDURE — 85025 COMPLETE CBC W/AUTO DIFF WBC: CPT

## 2017-10-30 PROCEDURE — 36415 COLL VENOUS BLD VENIPUNCTURE: CPT

## 2017-11-06 ENCOUNTER — HOSPITAL ENCOUNTER (INPATIENT)
Facility: HOSPITAL | Age: 62
LOS: 3 days | Discharge: HOME | DRG: 299 | End: 2017-11-09
Attending: EMERGENCY MEDICINE | Admitting: INTERNAL MEDICINE
Payer: COMMERCIAL

## 2017-11-06 ENCOUNTER — TRANSCRIBE ORDERS (OUTPATIENT)
Dept: ADMINISTRATIVE | Facility: HOSPITAL | Age: 62
End: 2017-11-06

## 2017-11-06 ENCOUNTER — HOSPITAL ENCOUNTER (OUTPATIENT)
Dept: RADIOLOGY | Age: 62
Discharge: HOME | End: 2017-11-06
Payer: COMMERCIAL

## 2017-11-06 ENCOUNTER — APPOINTMENT (EMERGENCY)
Dept: CT IMAGING | Facility: HOSPITAL | Age: 62
DRG: 299 | End: 2017-11-06
Payer: COMMERCIAL

## 2017-11-06 DIAGNOSIS — D58.9: ICD-10-CM

## 2017-11-06 DIAGNOSIS — R06.00 ACUTE DYSPNEA: Primary | ICD-10-CM

## 2017-11-06 DIAGNOSIS — R06.02 SOB (SHORTNESS OF BREATH): Primary | ICD-10-CM

## 2017-11-06 DIAGNOSIS — R06.02 SOB (SHORTNESS OF BREATH): ICD-10-CM

## 2017-11-06 DIAGNOSIS — D58.9 HEMOLYTIC ANEMIA (HCC): ICD-10-CM

## 2017-11-06 DIAGNOSIS — I26.99 PULMONARY EMBOLISM (HCC): ICD-10-CM

## 2017-11-06 PROBLEM — K21.9 GERD (GASTROESOPHAGEAL REFLUX DISEASE): Status: ACTIVE | Noted: 2017-11-06

## 2017-11-06 LAB
ALBUMIN SERPL BCP-MCNC: 4.5 G/DL (ref 3.5–5)
ALP SERPL-CCNC: 113 U/L (ref 46–116)
ALT SERPL W P-5'-P-CCNC: 50 U/L (ref 12–78)
ANION GAP SERPL CALCULATED.3IONS-SCNC: 8 MMOL/L (ref 4–13)
ANISOCYTOSIS BLD QL SMEAR: PRESENT
APTT PPP: 22 SECONDS (ref 23–35)
APTT PPP: 23 SECONDS (ref 23–35)
AST SERPL W P-5'-P-CCNC: 51 U/L (ref 5–45)
ATRIAL RATE: 89 BPM
BASOPHILS # BLD MANUAL: 0 THOUSAND/UL (ref 0–0.1)
BASOPHILS NFR MAR MANUAL: 0 % (ref 0–1)
BILIRUB SERPL-MCNC: 4.73 MG/DL (ref 0.2–1)
BUN SERPL-MCNC: 23 MG/DL (ref 5–25)
CALCIUM SERPL-MCNC: 9.2 MG/DL (ref 8.3–10.1)
CHLORIDE SERPL-SCNC: 104 MMOL/L (ref 100–108)
CO2 SERPL-SCNC: 30 MMOL/L (ref 21–32)
CREAT SERPL-MCNC: 1.03 MG/DL (ref 0.6–1.3)
DAT IGG-SP REAG RBCCO QL: NORMAL
DAT POLY-SP REAG RBC QL: NORMAL
EOSINOPHIL # BLD MANUAL: 0.21 THOUSAND/UL (ref 0–0.4)
EOSINOPHIL NFR BLD MANUAL: 1 % (ref 0–6)
ERYTHROCYTE [DISTWIDTH] IN BLOOD BY AUTOMATED COUNT: 19.6 % (ref 11.6–15.1)
ERYTHROCYTE [DISTWIDTH] IN BLOOD BY AUTOMATED COUNT: 20.4 % (ref 11.6–15.1)
GFR SERPL CREATININE-BSD FRML MDRD: 77 ML/MIN/1.73SQ M
GLUCOSE SERPL-MCNC: 142 MG/DL (ref 65–140)
HCT VFR BLD AUTO: 22.2 % (ref 36.5–49.3)
HCT VFR BLD AUTO: 25.1 % (ref 36.5–49.3)
HGB BLD-MCNC: 7.1 G/DL (ref 12–17)
HGB BLD-MCNC: 8.6 G/DL (ref 12–17)
INR PPP: 0.99 (ref 0.86–1.16)
INR PPP: 1.06 (ref 0.86–1.16)
LDH SERPL-CCNC: 672 U/L (ref 81–234)
LYMPHOCYTES # BLD AUTO: 25 % (ref 14–44)
LYMPHOCYTES # BLD AUTO: 5.14 THOUSAND/UL (ref 0.6–4.47)
MACROCYTES BLD QL AUTO: PRESENT
MCH RBC QN AUTO: 42 PG (ref 26.8–34.3)
MCH RBC QN AUTO: 45 PG (ref 26.8–34.3)
MCHC RBC AUTO-ENTMCNC: 32 G/DL (ref 31.4–37.4)
MCHC RBC AUTO-ENTMCNC: 34.3 G/DL (ref 31.4–37.4)
MCV RBC AUTO: 131 FL (ref 82–98)
MCV RBC AUTO: 131 FL (ref 82–98)
MONOCYTES # BLD AUTO: 1.85 THOUSAND/UL (ref 0–1.22)
MONOCYTES NFR BLD: 9 % (ref 4–12)
NEUTROPHILS # BLD MANUAL: 13.36 THOUSAND/UL (ref 1.85–7.62)
NEUTS BAND NFR BLD MANUAL: 3 % (ref 0–8)
NEUTS SEG NFR BLD AUTO: 62 % (ref 43–75)
NRBC BLD AUTO-RTO: 10 /100 WBC (ref 0–2)
NRBC BLD AUTO-RTO: 17 /100 WBCS
P AXIS: 51 DEGREES
PLATELET # BLD AUTO: 460 THOUSANDS/UL (ref 149–390)
PLATELET # BLD AUTO: 516 THOUSANDS/UL (ref 149–390)
PLATELET BLD QL SMEAR: ABNORMAL
PMV BLD AUTO: 10.1 FL (ref 8.9–12.7)
PMV BLD AUTO: 9.5 FL (ref 8.9–12.7)
POLYCHROMASIA BLD QL SMEAR: PRESENT
POTASSIUM SERPL-SCNC: 4.2 MMOL/L (ref 3.5–5.3)
PR INTERVAL: 128 MS
PROT SERPL-MCNC: 7.3 G/DL (ref 6.4–8.2)
PROTHROMBIN TIME: 13.1 SECONDS (ref 12.1–14.4)
PROTHROMBIN TIME: 13.8 SECONDS (ref 12.1–14.4)
QRS AXIS: 47 DEGREES
QRSD INTERVAL: 84 MS
QT INTERVAL: 366 MS
QTC INTERVAL: 445 MS
RBC # BLD AUTO: 1.69 MILLION/UL (ref 3.88–5.62)
RBC # BLD AUTO: 1.91 MILLION/UL (ref 3.88–5.62)
RETICS # AUTO: ABNORMAL 10*3/UL (ref 14356–105094)
RETICS # CALC: 20.85 % (ref 0.37–1.87)
SODIUM SERPL-SCNC: 142 MMOL/L (ref 136–145)
T WAVE AXIS: -13 DEGREES
TOTAL CELLS COUNTED SPEC: 100
TROPONIN I SERPL-MCNC: <0.02 NG/ML
VENTRICULAR RATE: 89 BPM
WBC # BLD AUTO: 18.98 THOUSAND/UL (ref 4.31–10.16)
WBC # BLD AUTO: 20.55 THOUSAND/UL (ref 4.31–10.16)

## 2017-11-06 PROCEDURE — 84484 ASSAY OF TROPONIN QUANT: CPT | Performed by: EMERGENCY MEDICINE

## 2017-11-06 PROCEDURE — 80053 COMPREHEN METABOLIC PANEL: CPT | Performed by: EMERGENCY MEDICINE

## 2017-11-06 PROCEDURE — 96374 THER/PROPH/DIAG INJ IV PUSH: CPT

## 2017-11-06 PROCEDURE — 85045 AUTOMATED RETICULOCYTE COUNT: CPT | Performed by: EMERGENCY MEDICINE

## 2017-11-06 PROCEDURE — 85610 PROTHROMBIN TIME: CPT | Performed by: EMERGENCY MEDICINE

## 2017-11-06 PROCEDURE — 85027 COMPLETE CBC AUTOMATED: CPT | Performed by: EMERGENCY MEDICINE

## 2017-11-06 PROCEDURE — 86922 COMPATIBILITY TEST ANTIGLOB: CPT

## 2017-11-06 PROCEDURE — 85027 COMPLETE CBC AUTOMATED: CPT | Performed by: PHYSICIAN ASSISTANT

## 2017-11-06 PROCEDURE — 96361 HYDRATE IV INFUSION ADD-ON: CPT

## 2017-11-06 PROCEDURE — 83010 ASSAY OF HAPTOGLOBIN QUANT: CPT | Performed by: EMERGENCY MEDICINE

## 2017-11-06 PROCEDURE — 85610 PROTHROMBIN TIME: CPT | Performed by: PHYSICIAN ASSISTANT

## 2017-11-06 PROCEDURE — 99285 EMERGENCY DEPT VISIT HI MDM: CPT

## 2017-11-06 PROCEDURE — 86921 COMPATIBILITY TEST INCUBATE: CPT

## 2017-11-06 PROCEDURE — 85730 THROMBOPLASTIN TIME PARTIAL: CPT | Performed by: EMERGENCY MEDICINE

## 2017-11-06 PROCEDURE — 71275 CT ANGIOGRAPHY CHEST: CPT

## 2017-11-06 PROCEDURE — 86880 COOMBS TEST DIRECT: CPT | Performed by: EMERGENCY MEDICINE

## 2017-11-06 PROCEDURE — 83615 LACTATE (LD) (LDH) ENZYME: CPT | Performed by: EMERGENCY MEDICINE

## 2017-11-06 PROCEDURE — 85007 BL SMEAR W/DIFF WBC COUNT: CPT | Performed by: EMERGENCY MEDICINE

## 2017-11-06 PROCEDURE — 93005 ELECTROCARDIOGRAM TRACING: CPT | Performed by: EMERGENCY MEDICINE

## 2017-11-06 PROCEDURE — 36415 COLL VENOUS BLD VENIPUNCTURE: CPT | Performed by: EMERGENCY MEDICINE

## 2017-11-06 PROCEDURE — 85730 THROMBOPLASTIN TIME PARTIAL: CPT | Performed by: PHYSICIAN ASSISTANT

## 2017-11-06 RX ORDER — ACETAMINOPHEN 325 MG/1
650 TABLET ORAL EVERY 6 HOURS PRN
Status: DISCONTINUED | OUTPATIENT
Start: 2017-11-06 | End: 2017-11-09 | Stop reason: HOSPADM

## 2017-11-06 RX ORDER — CHOLECALCIFEROL (VITAMIN D3) 125 MCG
1000 CAPSULE ORAL DAILY
Status: DISCONTINUED | OUTPATIENT
Start: 2017-11-07 | End: 2017-11-09 | Stop reason: HOSPADM

## 2017-11-06 RX ORDER — PANTOPRAZOLE SODIUM 40 MG/1
40 TABLET, DELAYED RELEASE ORAL
Status: DISCONTINUED | OUTPATIENT
Start: 2017-11-07 | End: 2017-11-09 | Stop reason: HOSPADM

## 2017-11-06 RX ORDER — HEPARIN SODIUM 1000 [USP'U]/ML
6800 INJECTION, SOLUTION INTRAVENOUS; SUBCUTANEOUS ONCE
Status: COMPLETED | OUTPATIENT
Start: 2017-11-06 | End: 2017-11-07

## 2017-11-06 RX ORDER — SODIUM CHLORIDE 9 MG/ML
50 INJECTION, SOLUTION INTRAVENOUS CONTINUOUS
Status: DISCONTINUED | OUTPATIENT
Start: 2017-11-06 | End: 2017-11-07

## 2017-11-06 RX ORDER — FERROUS SULFATE 325(65) MG
325 TABLET ORAL DAILY
Status: DISCONTINUED | OUTPATIENT
Start: 2017-11-07 | End: 2017-11-09 | Stop reason: HOSPADM

## 2017-11-06 RX ORDER — HEPARIN SODIUM 10000 [USP'U]/100ML
3-30 INJECTION, SOLUTION INTRAVENOUS
Status: DISCONTINUED | OUTPATIENT
Start: 2017-11-06 | End: 2017-11-08

## 2017-11-06 RX ORDER — DIPHENHYDRAMINE HYDROCHLORIDE 50 MG/ML
50 INJECTION INTRAMUSCULAR; INTRAVENOUS ONCE
Status: COMPLETED | OUTPATIENT
Start: 2017-11-06 | End: 2017-11-06

## 2017-11-06 RX ORDER — PREDNISONE 1 MG/1
10 TABLET ORAL DAILY
Status: DISCONTINUED | OUTPATIENT
Start: 2017-11-07 | End: 2017-11-08

## 2017-11-06 RX ORDER — ONDANSETRON 2 MG/ML
4 INJECTION INTRAMUSCULAR; INTRAVENOUS EVERY 6 HOURS PRN
Status: DISCONTINUED | OUTPATIENT
Start: 2017-11-06 | End: 2017-11-09 | Stop reason: HOSPADM

## 2017-11-06 RX ORDER — ERGOCALCIFEROL 1.25 MG/1
50000 CAPSULE ORAL WEEKLY
Status: DISCONTINUED | OUTPATIENT
Start: 2017-11-07 | End: 2017-11-09 | Stop reason: HOSPADM

## 2017-11-06 RX ORDER — ASPIRIN 81 MG/1
81 TABLET, CHEWABLE ORAL DAILY
Status: DISCONTINUED | OUTPATIENT
Start: 2017-11-07 | End: 2017-11-09 | Stop reason: HOSPADM

## 2017-11-06 RX ADMIN — DIPHENHYDRAMINE HYDROCHLORIDE 50 MG: 50 INJECTION, SOLUTION INTRAMUSCULAR; INTRAVENOUS at 16:01

## 2017-11-06 RX ADMIN — SODIUM CHLORIDE 50 ML/HR: 0.9 INJECTION, SOLUTION INTRAVENOUS at 22:18

## 2017-11-06 RX ADMIN — SODIUM CHLORIDE 1000 ML: 0.9 INJECTION, SOLUTION INTRAVENOUS at 16:00

## 2017-11-06 RX ADMIN — IOHEXOL 85 ML: 350 INJECTION, SOLUTION INTRAVENOUS at 16:46

## 2017-11-06 NOTE — ED NOTES
Patient transported to [address] [place] [address], [address] [place] [address]  11/06/17 1214

## 2017-11-06 NOTE — ED PROVIDER NOTES
History  Chief Complaint   Patient presents with    Shortness of Breath     pt reports SOB for the last 3 weeks to a month that is getting worse  sent here to r/o PE and states he needs a CT but he is allergic to the contrast so would need the prep which is why they sent him here  denies pain at this times  History provided by:  Patient   used: No    Shortness of Breath   Severity:  Moderate  Onset quality:  Gradual  Duration:  3 weeks  Timing:  Constant  Progression:  Worsening  Chronicity:  New  Context: activity    Relieved by:  Rest  Worsened by:  Exertion  Ineffective treatments:  None tried  Associated symptoms: no abdominal pain, no chest pain, no cough, no diaphoresis, no fever, no headaches, no neck pain, no rash, no sore throat, no sputum production, no vomiting and no wheezing        Prior to Admission Medications   Prescriptions Last Dose Informant Patient Reported? Taking?    Ferrous Sulfate (IRON) 325 (65 FE) MG TABS   Yes No   Sig: Take 325 mg by mouth daily     Multiple Vitamins-Minerals (ONE DAILY MENS) TABS   Yes No   Sig: Take by mouth   aspirin 81 MG tablet   Yes No   Sig: Take 81 mg by mouth daily   cyanocobalamin (VITAMIN B-12) 1,000 mcg tablet   Yes No   Sig: Take 1,000 mcg by mouth daily   ergocalciferol (VITAMIN D2) 50,000 units   Yes No   Sig: Take 50,000 Units by mouth once a week     pantoprazole (PROTONIX) 40 mg tablet   No No   Sig: Take 1 tablet by mouth daily in the early morning   predniSONE 20 mg tablet   Yes No   Sig: Take 10 mg by mouth daily        Facility-Administered Medications: None       Past Medical History:   Diagnosis Date    Hemolytic anemia (HCC)     Palpitation     Tobacco abuse        Past Surgical History:   Procedure Laterality Date    KNEE SURGERY Right     TX REMOVAL SPLEEN, TOTAL N/A 5/18/2017    Procedure: LAPAROSCOPIC HAND ASSIST SPLENECTOMY;  Surgeon: Crystal Holloway MD;  Location: [facility];  Service: Surgical Oncology  SHOULDER SURGERY Left        History reviewed  No pertinent family history  I have reviewed and agree with the history as documented  Social History   Substance Use Topics    Smoking status: Light Tobacco Smoker     Types: Cigars    Smokeless tobacco: Current User      Comment: socially    Alcohol use 3 6 oz/week     6 Cans of beer per week      Comment: social        Review of Systems   Constitutional: Negative for diaphoresis, fatigue and fever  HENT: Negative for congestion, sore throat and trouble swallowing  Eyes: Negative for pain and visual disturbance  Respiratory: Positive for shortness of breath  Negative for apnea, cough, sputum production, chest tightness and wheezing  Cardiovascular: Negative for chest pain  Gastrointestinal: Negative for abdominal pain, diarrhea, nausea and vomiting  Endocrine: Negative for polyphagia and polyuria  Genitourinary: Negative for dysuria, flank pain, frequency, hematuria and urgency  Musculoskeletal: Negative for back pain, gait problem, neck pain and neck stiffness  Skin: Negative for color change and rash  Allergic/Immunologic: Negative for immunocompromised state  Neurological: Negative for dizziness, speech difficulty, numbness and headaches  Hematological: Does not bruise/bleed easily  Psychiatric/Behavioral: Negative for confusion and decreased concentration         Physical Exam  ED Triage Vitals   Temperature Pulse Respirations Blood Pressure SpO2   11/06/17 1509 11/06/17 1509 11/06/17 1509 11/06/17 1509 11/06/17 1509   98 1 °F (36 7 °C) 93 16 149/84 99 %      Temp Source Heart Rate Source Patient Position - Orthostatic VS BP Location FiO2 (%)   11/06/17 1509 11/06/17 1606 11/06/17 1606 11/06/17 1606 --   Oral Monitor Lying Right arm       Pain Score       11/06/17 1511       No Pain           Orthostatic Vital Signs  Vitals:    11/08/17 0834 11/08/17 1507 11/08/17 2329 11/09/17 0704   BP: 149/70 142/69 125/58 147/76   Pulse: 72 74 70 69   Patient Position - Orthostatic VS: Lying Lying Lying Lying       Physical Exam   Constitutional: He is oriented to person, place, and time  He appears well-developed and well-nourished  HENT:   Head: Normocephalic  Eyes: Conjunctivae and EOM are normal  Pupils are equal, round, and reactive to light  No scleral icterus  Neck: Normal range of motion  Neck supple  Cardiovascular: Normal rate and regular rhythm  Pulmonary/Chest: Effort normal and breath sounds normal  No respiratory distress  Abdominal: Soft  Bowel sounds are normal  He exhibits no distension  There is no tenderness  There is no rebound and no guarding  Musculoskeletal: Normal range of motion  He exhibits no tenderness or deformity  Lymphadenopathy:     He has no cervical adenopathy  Neurological: He is alert and oriented to person, place, and time  Coordination normal    Skin: Skin is warm and dry  He is not diaphoretic  Psychiatric: He has a normal mood and affect  Vitals reviewed        ED Medications  Medications   aspirin chewable tablet 81 mg (81 mg Oral Given 11/9/17 0849)   cyanocobalamin (VITAMIN B-12) tablet 1,000 mcg (1,000 mcg Oral Given 11/9/17 0848)   ergocalciferol (VITAMIN D2) capsule 50,000 Units (50,000 Units Oral Given 11/7/17 1002)   ferrous sulfate tablet 325 mg (325 mg Oral Given 11/9/17 0849)   pantoprazole (PROTONIX) EC tablet 40 mg (40 mg Oral Given 11/9/17 0518)   ondansetron (ZOFRAN) injection 4 mg (not administered)   acetaminophen (TYLENOL) tablet 650 mg (650 mg Oral Given 11/8/17 1532)   influenza inactivated quadrivalent vaccine (FLULAVAL) IM injection 0 5 mL (not administered)   predniSONE tablet 20 mg (20 mg Oral Given 11/9/17 0849)   rivaroxaban (XARELTO) tablet 15 mg (15 mg Oral Given 11/9/17 0849)   sodium chloride 0 9 % bolus 1,000 mL (0 mL Intravenous Stopped 11/6/17 1744)   diphenhydrAMINE (BENADRYL) injection 50 mg (50 mg Intravenous Given 11/6/17 1601)   iohexol (OMNIPAQUE) 350 MG/ML injection (MULTI-DOSE) 85 mL (85 mL Intravenous Given 11/6/17 1646)   heparin (porcine) injection 6,800 Units (6,800 Units Intravenous Given 11/7/17 0006)       Diagnostic Studies  Results Reviewed     Procedure Component Value Units Date/Time    Haptoglobin [[id number]]  (Abnormal) Collected:  11/06/17 1809    Lab Status:  Final result Specimen:  Blood from Arm, Left Updated:  11/08/17 1409     Haptoglobin <10 (L) mg/dL     Narrative:       Performed at:  [address] [place] [address]  [id number]  : Ruth Douglas MD, Phone:  [phone]    LD,Blood [[id number]]  (Abnormal) Collected:  11/06/17 1809    Lab Status:  Final result Specimen:  Blood from Arm, Left Updated:  11/06/17 1959      (H) U/L     Reticulocytes [[id number]]  (Abnormal) Collected:  11/06/17 1809    Lab Status:  Final result Specimen:  Blood from Arm, Left Updated:  11/06/17 1907     Retic Ct Abs 333,600 (H)     Retic Ct Pct 20 85 (H) %     CBC and differential [[id number]]  (Abnormal) Collected:  11/06/17 1559    Lab Status:  Final result Specimen:  Blood from Arm, Left Updated:  11/06/17 1656     WBC 20 55 (H) Thousand/uL      RBC 1 91 (L) Million/uL      Hemoglobin 8 6 (L) g/dL      Hematocrit 25 1 (L) %       (H) fL      MCH 45 0 (H) pg      MCHC 34 3 g/dL      RDW 20 4 (H) %      MPV 10 1 fL      Platelets 170 (H) Thousands/uL      nRBC 17 /100 WBCs     Protime-INR [[id number]]  (Normal) Collected:  11/06/17 1559    Lab Status:  Final result Specimen:  Blood from Arm, Left Updated:  11/06/17 1634     Protime 13 1 seconds      INR 0 99    APTT [[id number]]  (Abnormal) Collected:  11/06/17 1559    Lab Status:  Final result Specimen:  Blood from Arm, Left Updated:  11/06/17 1634     PTT 22 (L) seconds     Narrative:          Therapeutic Heparin Range = 60-90 seconds  Therapeutic Heparin Range = 60-90 seconds    Troponin I [[id number]]  (Normal) Collected:  11/06/17 5191    Lab Status:  Final result Specimen:  Blood from Arm, Left Updated:  11/06/17 1627     Troponin I <0 02 ng/mL     Narrative:         [employer] Chemistry analyzer 99% cutoff is > 0 04 ng/mL in network labs    o cTnI 99% cutoff is useful only when applied to patients in the clinical setting of myocardial ischemia  o cTnI 99% cutoff should be interpreted in the context of clinical history, ECG findings and possibly cardiac imaging to establish correct diagnosis  o cTnI 99% cutoff may be suggestive but clearly not indicative of a coronary event without the clinical setting of myocardial ischemia  Comprehensive metabolic panel [[id number]]  (Abnormal) Collected:  11/06/17 1559    Lab Status:  Final result Specimen:  Blood from Arm, Left Updated:  11/06/17 1625     Sodium 142 mmol/L      Potassium 4 2 mmol/L      Chloride 104 mmol/L      CO2 30 mmol/L      Anion Gap 8 mmol/L      BUN 23 mg/dL      Creatinine 1 03 mg/dL      Glucose 142 (H) mg/dL      Calcium 9 2 mg/dL      AST 51 (H) U/L      ALT 50 U/L      Alkaline Phosphatase 113 U/L      Total Protein 7 3 g/dL      Albumin 4 5 g/dL      Total Bilirubin 4 73 (H) mg/dL      eGFR 77 ml/min/1 73sq m     Narrative:         National Kidney Disease Education Program recommendations are as follows:  GFR calculation is accurate only with a steady state creatinine  Chronic Kidney disease less than 60 ml/min/1 73 sq  meters  Kidney failure less than 15 ml/min/1 73 sq  meters  VAS lower limb venous duplex study, complete bilateral   Final Result by Bronson Villagran DO (11/07 1836)      CTA ED chest PE study   Final Result by Lucas Taylor DO (11/06 1715)      There is a filling defect in a subsegmental branch of the right lower lobe pulmonary artery compatible with acute pulmonary embolism        ##cfslh   I personally discussed this result with Anupama Ball on 11/6/2017 5:14 PM    ##                        Workstation performed: [id number]                    Procedures  ECG 12 Lead Documentation  Date/Time: 11/6/2017 3:15 PM  Performed by: Kristin Garcia  Authorized by: Kristin Garcia     ECG reviewed by me, the ED Provider: yes    Patient location:  ED  Previous ECG:     Previous ECG:  Compared to current    Comparison ECG info:  June 2017    Similarity:  No change    Comparison to cardiac monitor: Yes    Interpretation:     Interpretation: normal    Rate:     ECG rate assessment: normal    Rhythm:     Rhythm: sinus rhythm    Ectopy:     Ectopy: none    QRS:     QRS axis:  Normal    QRS intervals:  Normal  Conduction:     Conduction: normal    ST segments:     ST segments:  Normal  T waves:     T waves: normal             Phone Contacts  ED Phone Contact    ED Course  ED Course as of Nov 09 1212   [facility] Nov 06, 2017   2677 Stool sample:  Abhay De Leon stool, Hemoccult negative  MDM  Number of Diagnoses or Management Options  Acute dyspnea: new and requires workup  Hemolytic anemia ([facility] [address] ): new and requires workup  Pulmonary embolism [facility]): new and requires workup  Diagnosis management comments: 59 y/o male with c/o worsening SOB over the past 3 weeks  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 and previous hx of PE and portal vein thrombosis  States worsening SOB and LOWE for 3 weeks, worse over past 3 days  No cp, diaphoresis, or nausea/v  No sob at rest  Recently reduced prednisone from 20 mg to 10 mg daily  59 y/o for the above  Subsegmental PE on CTA  Pt also with decrease in Hgb 10 7 to 8 6 today  Stool is heme occult neg  Discussed with Heme on call, recommended inpatient admit for heparin and heme eval, recommend returning pred to 20 mg  EKG and trop neg for ACS  Discussed with SLIM           Amount and/or Complexity of Data Reviewed  Clinical lab tests: reviewed and ordered  Tests in the radiology section of CPT®: ordered and reviewed  Review and summarize past medical records: yes  Independent visualization of images, tracings, or specimens: yes      CritCare Time    Disposition  Final diagnoses:   Acute dyspnea   Hemolytic anemia ([address] )   Pulmonary embolism ([facility] [address] )     Time reflects when diagnosis was documented in both MDM as applicable and the Disposition within this note     Time User Action Codes Description Comment    11/6/2017  5:52 PM Alease Locus L Add [R06 00] Acute dyspnea     11/6/2017  5:53 PM Alease Locus L Add [D58 9] Hemolytic anemia ([facility] [address] )     11/6/2017  5:53 PM Isabelle Hollow Add [I26 99] Pulmonary embolism [facility])       ED Disposition     ED Disposition Condition Comment    Admit  Case was discussed with CHAZ and the patient's admission status was agreed to be Admission Status: inpatient status to the service of Dr Mariam Prajapati  Follow-up Information     Follow up With Specialties Details Why Contact Mansi Hui, DO Family Medicine Follow up in 1 week(s)  [address] [facility], DO Hematology and Oncology Follow up in 1 week(s)  [address] [facility] [address] [facility]  [address] [facility]  Follow up as previously scheduled   [address] [place] [address]          Current Discharge Medication List      START taking these medications    Details   influenza inactivated quadrivalent vaccine (FLULAVAL) 0 5 ML ANALILIA Inject 0 5 mL into the shoulder, thigh, or buttocks prior to discharge (preventative) for up to 1 dose  Qty: 1 Syringe, Refills: 0      !! rivaroxaban (XARELTO) 15 mg tablet Take 1 tablet by mouth 2 (two) times a day with meals for 21 days  Qty: 42 tablet, Refills: 0      !! rivaroxaban (XARELTO) 20 mg tablet Take 1 tablet by mouth daily with breakfast  Qty: 30 tablet, Refills: 0    Comments: Complete 21 days of 15mg 2x/day, then start 20mg daily       ! ! - Potential duplicate medications found  Please discuss with provider        CONTINUE these medications which have CHANGED    Details   predniSONE 20 mg tablet Take 1 tablet by mouth daily  Qty: 60 tablet, Refills: 0         CONTINUE these medications which have NOT CHANGED    Details   aspirin 81 MG tablet Take 81 mg by mouth daily      cyanocobalamin (VITAMIN B-12) 1,000 mcg tablet Take 1,000 mcg by mouth daily      ergocalciferol (VITAMIN D2) 50,000 units Take 50,000 Units by mouth once a week        Ferrous Sulfate (IRON) 325 (65 FE) MG TABS Take 325 mg by mouth daily        Multiple Vitamins-Minerals (ONE DAILY MENS) TABS Take by mouth      pantoprazole (PROTONIX) 40 mg tablet Take 1 tablet by mouth daily in the early morning  Qty: 30 tablet, Refills: 0             Outpatient Discharge Orders  Discharge Diet     Activity as tolerated     Discharge Instructions         ED Provider  Electronically Signed by           Hina Rizzo MD  11/09/17 7304

## 2017-11-07 ENCOUNTER — APPOINTMENT (INPATIENT)
Dept: NON INVASIVE DIAGNOSTICS | Facility: HOSPITAL | Age: 62
DRG: 299 | End: 2017-11-07
Payer: COMMERCIAL

## 2017-11-07 LAB
ALBUMIN SERPL BCP-MCNC: 3.6 G/DL (ref 3.5–5)
ALP SERPL-CCNC: 84 U/L (ref 46–116)
ALT SERPL W P-5'-P-CCNC: 40 U/L (ref 12–78)
ANION GAP SERPL CALCULATED.3IONS-SCNC: 8 MMOL/L (ref 4–13)
APTT PPP: 64 SECONDS (ref 23–35)
APTT PPP: 78 SECONDS (ref 23–35)
AST SERPL W P-5'-P-CCNC: 41 U/L (ref 5–45)
BILIRUB SERPL-MCNC: 4.24 MG/DL (ref 0.2–1)
BUN SERPL-MCNC: 18 MG/DL (ref 5–25)
CALCIUM SERPL-MCNC: 8.5 MG/DL (ref 8.3–10.1)
CHLORIDE SERPL-SCNC: 108 MMOL/L (ref 100–108)
CO2 SERPL-SCNC: 27 MMOL/L (ref 21–32)
CREAT SERPL-MCNC: 0.89 MG/DL (ref 0.6–1.3)
ERYTHROCYTE [DISTWIDTH] IN BLOOD BY AUTOMATED COUNT: 19.4 % (ref 11.6–15.1)
GFR SERPL CREATININE-BSD FRML MDRD: 91 ML/MIN/1.73SQ M
GLUCOSE SERPL-MCNC: 105 MG/DL (ref 65–140)
HCT VFR BLD AUTO: 21.6 % (ref 36.5–49.3)
HGB BLD-MCNC: 6.7 G/DL (ref 12–17)
MCH RBC QN AUTO: 41.1 PG (ref 26.8–34.3)
MCHC RBC AUTO-ENTMCNC: 31 G/DL (ref 31.4–37.4)
MCV RBC AUTO: 133 FL (ref 82–98)
PLATELET # BLD AUTO: 458 THOUSANDS/UL (ref 149–390)
PMV BLD AUTO: 9.4 FL (ref 8.9–12.7)
POTASSIUM SERPL-SCNC: 3.5 MMOL/L (ref 3.5–5.3)
PROT SERPL-MCNC: 5.9 G/DL (ref 6.4–8.2)
RBC # BLD AUTO: 1.63 MILLION/UL (ref 3.88–5.62)
SODIUM SERPL-SCNC: 143 MMOL/L (ref 136–145)
WBC # BLD AUTO: 20.52 THOUSAND/UL (ref 4.31–10.16)

## 2017-11-07 PROCEDURE — 86860 RBC ANTIBODY ELUTION: CPT

## 2017-11-07 PROCEDURE — 86870 RBC ANTIBODY IDENTIFICATION: CPT | Performed by: INTERNAL MEDICINE

## 2017-11-07 PROCEDURE — 86902 BLOOD TYPE ANTIGEN DONOR EA: CPT

## 2017-11-07 PROCEDURE — 86905 BLOOD TYPING RBC ANTIGENS: CPT

## 2017-11-07 PROCEDURE — 86900 BLOOD TYPING SEROLOGIC ABO: CPT | Performed by: PHYSICIAN ASSISTANT

## 2017-11-07 PROCEDURE — 86850 RBC ANTIBODY SCREEN: CPT | Performed by: PHYSICIAN ASSISTANT

## 2017-11-07 PROCEDURE — 86900 BLOOD TYPING SEROLOGIC ABO: CPT

## 2017-11-07 PROCEDURE — 86870 RBC ANTIBODY IDENTIFICATION: CPT

## 2017-11-07 PROCEDURE — 85027 COMPLETE CBC AUTOMATED: CPT | Performed by: PHYSICIAN ASSISTANT

## 2017-11-07 PROCEDURE — 86901 BLOOD TYPING SEROLOGIC RH(D): CPT

## 2017-11-07 PROCEDURE — 86906 BLD TYPING SEROLOGIC RH PHNT: CPT

## 2017-11-07 PROCEDURE — 93970 EXTREMITY STUDY: CPT

## 2017-11-07 PROCEDURE — 80053 COMPREHEN METABOLIC PANEL: CPT | Performed by: PHYSICIAN ASSISTANT

## 2017-11-07 PROCEDURE — 86945 BLOOD PRODUCT/IRRADIATION: CPT

## 2017-11-07 PROCEDURE — 85730 THROMBOPLASTIN TIME PARTIAL: CPT | Performed by: INTERNAL MEDICINE

## 2017-11-07 PROCEDURE — 93306 TTE W/DOPPLER COMPLETE: CPT

## 2017-11-07 PROCEDURE — 86880 COOMBS TEST DIRECT: CPT

## 2017-11-07 PROCEDURE — 30233N1 TRANSFUSION OF NONAUTOLOGOUS RED BLOOD CELLS INTO PERIPHERAL VEIN, PERCUTANEOUS APPROACH: ICD-10-PCS | Performed by: INTERNAL MEDICINE

## 2017-11-07 PROCEDURE — 86901 BLOOD TYPING SEROLOGIC RH(D): CPT | Performed by: PHYSICIAN ASSISTANT

## 2017-11-07 RX ADMIN — HEPARIN SODIUM 6800 UNITS: 1000 INJECTION, SOLUTION INTRAVENOUS; SUBCUTANEOUS at 00:06

## 2017-11-07 RX ADMIN — CYANOCOBALAMIN TAB 500 MCG 1000 MCG: 500 TAB at 10:00

## 2017-11-07 RX ADMIN — ASPIRIN 81 MG 81 MG: 81 TABLET ORAL at 10:00

## 2017-11-07 RX ADMIN — ERGOCALCIFEROL 50000 UNITS: 1.25 CAPSULE ORAL at 10:02

## 2017-11-07 RX ADMIN — HEPARIN SODIUM AND DEXTROSE 18 UNITS/KG/HR: 10000; 5 INJECTION INTRAVENOUS at 00:05

## 2017-11-07 RX ADMIN — FERROUS SULFATE TAB 325 MG (65 MG ELEMENTAL FE) 325 MG: 325 (65 FE) TAB at 10:00

## 2017-11-07 RX ADMIN — HEPARIN SODIUM AND DEXTROSE 18 UNITS/KG/HR: 10000; 5 INJECTION INTRAVENOUS at 18:33

## 2017-11-07 RX ADMIN — PREDNISONE 10 MG: 5 TABLET ORAL at 10:00

## 2017-11-07 RX ADMIN — PANTOPRAZOLE SODIUM 40 MG: 40 TABLET, DELAYED RELEASE ORAL at 05:25

## 2017-11-07 NOTE — PLAN OF CARE

## 2017-11-07 NOTE — SOCIAL WORK
Met with patient he lives with his wife in a house with multilpe stories  No DME  Independent PTA  He plans to start working at the [address] in the [address] in a few weeks  Pt drives  Pt uses [employer] in [place]  He told CM he previous worked at [employer] in [facility] in [facility]  Patient has a history of a PE  Pt does not have advanced directives and declined information on them

## 2017-11-07 NOTE — PROGRESS NOTES
[facility] Internal Medicine Progress Note  Patient: Jose Roberto Sampson 60 y o  male   MRN: [id number]  PCP: Lynn Rivera DO  Unit/Bed#: [facility] 2 -01 Encounter: [id number]  Date Of Visit: 11/07/17      Assessment/plan  Primary Problem(s):  1  Acute pulmonary embolism with History of PE 6/6/17 and was anticoagulated on Xarelto for about 2 months  Xarelto d/nima by hematologist  Kenyetta Murphy chest showed acute pe  Continue heparin gtt  Await Oncology/Hematology for further guidance of acute PE in the setting of autoimmune hemolytic anemia  Check venous doppler     Additional Problems:   1  acute anemia due to Autoimmune hemolytic anemia:  Follows with Dr Antoinette Vásquez as an outpatient  Last follow-up visit was 10/17/17  Status post splenectomy 5/18/17  S/p rituxan x4  Has been gradually taper down off of prednisone  20 mg --> 15 mg--> and is currently taking 10 mg daily  Next dosage tomorrow am  Will defer to hematology/oncology further dosing  Reticulocytes, LDH, haptoglobin, anti complement, CEE IgG, direct antiglobulin test pending  Possibly need to      2  GERD: Continue PPI    3  Leukocytosis due to steroid use    Subjective:   Called by nurse due to hemoglobin of 6 8  Pt has a history of autoimmune hemolytic anemia  He denies any new medications  His steroids were taper from 20mg to 10mg  No f/c no cp  No worsening sob no melena  No n/v/d no abd pain  He does have leg pain  His right leg is painful to touch at his calf  It is a bit red and swollen  Objective:     Vitals: Blood pressure 130/60, pulse 88, temperature 98 5 °F (36 9 °C), temperature source Tympanic, resp  rate 19, weight 84 8 kg (187 lb), SpO2 95 %  ,Body mass index is 28 37 kg/m²      Lab, Imaging and other studies:    Results from last 7 days  Lab Units 11/07/17  0544 11/06/17  2252   WBC Thousand/uL 20 52* 18 98*   HEMOGLOBIN g/dL 6 7* 7 1*   HEMATOCRIT % 21 6* 22 2*   PLATELETS Thousands/uL 458* 460*   INR   --  1 06       Results from last 7 days  Lab Units 11/07/17  0544   SODIUM mmol/L 143   POTASSIUM mmol/L 3 5   CHLORIDE mmol/L 108   CO2 mmol/L 27   BUN mg/dL 18   CREATININE mg/dL 0 89   CALCIUM mg/dL 8 5   TOTAL PROTEIN g/dL 5 9*   BILIRUBIN TOTAL mg/dL 4 24*   ALK PHOS U/L 84   ALT U/L 40   AST U/L 41   GLUCOSE RANDOM mg/dL 105       Results from last 7 days  Lab Units 11/06/17  1559   TROPONIN I ng/mL <0 02     Lab Results   Component Value Date    URINECX No Growth <1000 cfu/mL 11/02/2016         Cta Ed Chest Pe Study    Result Date: 11/6/2017  Narrative: CTA - CHEST WITH IV CONTRAST - PULMONARY ANGIOGRAM INDICATION: Shortness of breath  History of pulmonary embolism  COMPARISON: June 6, 2017 TECHNIQUE: CTA examination of the chest was performed using angiographic technique according to a protocol specifically tailored to evaluate for pulmonary embolism  Reformatted images were created in axial, sagittal, and coronal planes  In addition, coronal 3D MIP postprocessing was performed on the acquisition scanner  Radiation dose length product (DLP) for this visit:  433 mGy-cm   This examination, like all CT scans performed in the [facility], was performed utilizing techniques to minimize radiation dose exposure, including the use of iterative reconstruction and automated exposure control  IV Contrast:  85 mL of iohexol (OMNIPAQUE)      FINDINGS: PULMONARY ARTERIAL TREE:  There is a filling defect in the distal branch of the right lower lobe pulmonary artery series 2 image 136 with slight enlargement of the vessel  This is new from prior study and likely represents a acute pulmonary embolism  Small peripheral emboli described in the prior study are not identified at this time  LUNGS:  Lungs are clear  There is no tracheal or endobronchial lesion  PLEURA:  Unremarkable  HEART/AORTA:  Unremarkable for patient's age  MEDIASTINUM AND BRENDA:  Unremarkable   CHEST WALL AND LOWER NECK:       Normal  VISUALIZED STRUCTURES IN THE UPPER ABDOMEN:  Surgical clips left upper quadrant  Prior splenectomy  OSSEOUS STRUCTURES:  No acute fracture or destructive osseous lesion  Impression: There is a filling defect in a subsegmental branch of the right lower lobe pulmonary artery compatible with acute pulmonary embolism  ##cfslh I personally discussed this result with Yancey Cogan on 11/6/2017 5:14 PM  ##    Workstation performed: [id number]         Physical exam:  Physical Exam  General appearance: alert, appears stated age and cooperative  Head: Normocephalic, without obvious abnormality, atraumatic  Eyes: conjunctivae/corneas clear  PERRL, EOM's intact  Fundi benign  Neck: no adenopathy, no carotid bruit, no JVD, supple, symmetrical, trachea midline and thyroid not enlarged, symmetric, no tenderness/mass/nodules  Lungs: clear to auscultation bilaterally  Heart: regular rate and rhythm, S1, S2 normal, no murmur, click, rub or gallop  Abdomen: soft, non-tender; bowel sounds normal; no masses,  no organomegaly  Extremities: +ttp right leg on shin     Pulses: 2+ and symmetric  Skin: Skin color, texture, turgor normal  No rashes or lesions  Neurologic: Grossly normal      VTE Pharmacologic Prophylaxis: Heparin  VTE Mechanical Prophylaxis: sequential compression device    Counseling / Coordination of Care  Total floor / unit time spent today 20 minutes      Current Length of Stay: 1 day(s)    Current Patient Status: Inpatient       Code Status: Level 1 - Full Code

## 2017-11-07 NOTE — CASE MANAGEMENT
Initial Clinical Review    Admission: Date/Time/Statement: 11/6/17 @ 1805     Orders Placed This Encounter   Procedures    Inpatient Admission (expected length of stay for this patient is greater than two midnights)     Standing Status:   Standing     Number of Occurrences:   1     Order Specific Question:   Admitting Physician     Answer:   Rafaela Burns [[id number]]     Order Specific Question:   Level of Care     Answer:   Med Surg [[id number]]     Order Specific Question:   Estimated length of stay     Answer:   More than 2 Midnights     Order Specific Question:   Certification     Answer:   I certify that inpatient services are medically necessary for this patient for a duration of greater than two midnights  See H&P and MD Progress Notes for additional information about the patient's course of treatment  ED: Date/Time/Mode of Arrival:   ED Arrival Information     Expected Arrival Acuity Means of Arrival Escorted By Service Admission Type    11/6/2017 14:56 11/6/2017 15:03 Urgent Walk-In Self General Medicine Urgent    Arrival Complaint    SOB          Chief Complaint:   Chief Complaint   Patient presents with    Shortness of Breath     pt reports SOB for the last 3 weeks to a month that is getting worse  sent here to r/o PE and states he needs a CT but he is allergic to the contrast so would need the prep which is why they sent him here  denies pain at this times  History of Illness:    Mikey Mcgraw is a 60 y o  male with past medical history of GERD, PE 6/6/17, and 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  Status post splenectomy 5/18/17 and rituximab therapy  Patient follows as an outpatient for this with Dr Kamini Hensley  Has CBC every few weeks and prednisone dosage has been continually adjusted/decreased  Currently taking 10 mg of prednisone daily      Patient presents with increasing shortness of breath had been ongoing for the past week however much more bothersome 3 days   Patient notes he has been remaining fairly active and going to the gym on a regular basis  Works out on treadmill  Over the past few days he recently noticed increasing difficulty breathing to the point where he notes "I am scared " Has had PE back in June of 2017 and reports similar feeling  Notes today he was significantly short of breath upon walking up the stairs  Associated dizziness and lightheadedness  Also complaining of pain/crampy sensation in his right lower extremity  Had venous dopplers in Aug of 2017 which were negative       Patient contacted his hematologist's office and was directed to the ER for further evaluation  In the ER, patient was noted to have an acute pulmonary embolism on CT  He has been started on a heparin drip      Denies smoking  Drinks a few beers a week in an occasional glass of bourbon  Denies any other drug use    Currently denies any chest pain or pressure, palpitations, abdominal pain, dysuria, fevers or chills, diarrhea, constipation    ED Vital Signs:   ED Triage Vitals   Temperature Pulse Respirations Blood Pressure SpO2   11/06/17 1509 11/06/17 1509 11/06/17 1509 11/06/17 1509 11/06/17 1509   98 1 °F (36 7 °C) 93 16 149/84 99 %      Temp Source Heart Rate Source Patient Position - Orthostatic VS BP Location FiO2 (%)   11/06/17 1509 11/06/17 1606 11/06/17 1606 11/06/17 1606 --   Oral Monitor Lying Right arm       Pain Score       11/06/17 1511       No Pain        Wt Readings from Last 1 Encounters:   11/06/17 84 8 kg (187 lb)       Vital Signs (abnormal):    above    Abnormal Labs/Diagnostic Test Results:    WBC    20 55  H/H  8 6/25 1  RBC   1 91  Platelets    005  AST    51  Retic CT   ABS   333,600  Retic  Ct    Pct   20 85  LD     672  Total  bili    4 73  Ct  Chest:     There is a filling defect in a subsegmental branch of the right lower lobe pulmonary artery compatible with acute pulmonary embolism    ED Treatment:   Medication Administration from 11/06/2017 1456 to 11/06/2017 2005       Date/Time Order Dose Route Action Action by Comments     11/06/2017 1744 sodium chloride 0 9 % bolus 1,000 mL 0 mL Intravenous Stopped Alisson Lomeli      11/06/2017 1600 sodium chloride 0 9 % bolus 1,000 mL 1,000 mL Intravenous New Bag [employer], RN      11/06/2017 1601 diphenhydrAMINE (BENADRYL) injection 50 mg 50 mg Intravenous Given [employer], RN      11/06/2017 1646 iohexol (OMNIPAQUE) 350 MG/ML injection (MULTI-DOSE) 85 mL 85 mL Intravenous Given Marin Napoles           Past Medical/Surgical History: Active Ambulatory Problems     Diagnosis Date Noted    Tobacco abuse     Palpitation     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    Jaundice 11/02/2016    Hyperbilirubinemia 11/02/2016    Hemolytic anemia (HCC)     Symptomatic anemia 02/03/2017    Acute pulmonary embolism ([facility] [address] ) 06/06/2017    Portal vein thrombosis 06/08/2017    Elevated blood pressure reading without diagnosis of hypertension 06/09/2017     Resolved Ambulatory Problems     Diagnosis Date Noted    No Resolved Ambulatory Problems     Past Medical History:   Diagnosis Date    Hemolytic anemia (HCC)     Palpitation     Tobacco abuse        Admitting Diagnosis: Shortness of breath [R06 02]  Pulmonary embolism (HCC) [I26 99]  Hemolytic anemia (HCC) [D58 9]  Acute dyspnea [R06 00]    Age/Sex: 60 y o  male    · Assessment/Plan:    Acute pulmonary embolism  ? Shortness of breath x3 weeks, worsening over the past few days especially on exertion  ? History of PE 6/6/17 and was anticoagulated on Xarelto for about 2 months  Taken off by hematologist   ? CT chest reveals filling defect in subsegmental branch of right lower lobe pulmonary artery compatible with acute PE   ? Will start on heparin drip here  ? Currently O2 saturations in the high 90's  ? Provide supplemental oxygen as needed to maintain sats >88%  ? Monitor on telemetry  ?  Obtain echo to evaluate for right heart strain  ? Consult Oncology/Hematology for further guidance of acute PE in the setting of autoimmune hemolytic anemia      Additional Problems:   · Autoimmune hemolytic anemia:  Follows with Dr Garrett Zambrano as an outpatient  Last follow-up visit was 10/17/17  Status post splenectomy 5/18/17  Has undergone course of chemotherapy  Received CBC every few weeks and prednisone dosage has been adjusted  Has been gradually taper down off of prednisone  20 mg --> 15 mg--> and is currently taking 10 mg daily  Next dosage tomorrow am  Will defer to hematology/oncology further dosing  Reticulocytes, LDH, haptoglobin, anti complement, CEE IgG, direct antiglobulin test pending      Acute on chronic anemia:  Baseline hgb around 10 6-10 7  Hemoglobin here 8 6  Will continue to monitor and transfuse as needed if symptomatic and 7 5 or below  · Left leg pain:  Patient reports increased like pain/crampy sensation that is relatively new  Had venous Dopplers in August of 2017 which revealed no acute DVTs  Will defer to a m  team if repeat imaging of lower extremities it is necessary as management would not change given patient is currently being treated with a heparin drip for PE  Avoid SCDs      · GERD: Continue PPI        VTE Prophylaxis: Heparin Drip  / sequential compression device   Code Status:  Full code  Anticipated Length of Stay:  Patient will be admitted on an Inpatient basis with an anticipated length of stay of  greater than 2 midnights   Justification for Hospital Stay:  Acute pulmonary embolism in setting autoimmune hemolytic anemia requiring further workup and intervention    Admission Orders:    IP    11/6  @    1806  Scheduled Meds:   aspirin 81 mg Oral Daily   cyanocobalamin 1,000 mcg Oral Daily   ergocalciferol 50,000 Units Oral Weekly   ferrous sulfate 325 mg Oral Daily   pantoprazole 40 mg Oral Early Morning   predniSONE 10 mg Oral Daily     Continuous Infusions:   heparin (porcine) 3-30 Units/kg/hr (Order-Specific) Last Rate: 18 Units/kg/hr (11/07/17 0005)     PRN Meds:   acetaminophen    influenza vaccine    ondansetron     Tele  Reg diet  2 DE  1 UPRBC  VAS  LE  Cons hematology    [address] [facility] in the [facility] by Juan Schaefer for 2017  Network Utilization Review Department  Phone: [phone]; Fax [phone]  ATTENTION: The Network Utilization Review Department is now centralized for our 7 Facilities  Make a note that we have a new phone and fax numbers for our Department  Please call with any questions or concerns to [phone] and carefully follow the prompts so that you are directed to the right person  All voicemails are confidential  Fax any determinations, approvals, denials, and requests for initial or continue stay review clinical to [phone]  Due to HIGH CALL volume, it would be easier if you could please send faxed requests to expedite your requests and in part, help us provide discharge notifications faster

## 2017-11-07 NOTE — CONSULTS
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, the prednisone dose was decreased from 15 mg daily to 10 mg daily 2 weeks ago  He has been feeling well otherwise  Past hematological history:      Warm antibody mediated autoimmune hemolytic anemia treated with prednisone, rituximab weekly x4 for 2 courses beginning March 9, 2017 and again beginning September 22, 2017, laparoscopic splenectomy May 18, 2017    Several small peripheral pulmonary emboli noted on CT angiogram of June 6, 2017 managed with a 3 month course of rivaroxaban    Inpatient consult to Hematology  Consult performed by: Collins Jean ordered by: Thuy Frederick:   General: Feels well, no chills or swaets  Head and Neck: No nosebleeds, no oral cavity or throat soreness  Cardiovascular: No chest pain, no lower extremity edema  Respriatory: No cough  GI: Appetite is good, no abdominal pain, bowel habits formed and regular  : No urinary frequency  Musculoskeletal: No back pains or joint pain    Skin: No skin rash  Neurological: No headache, no numbness, no weakness  Hematologic: No easy bruising  Psychiatric: No emotional problems    Historical Information   Past Medical History:   Diagnosis Date    Hemolytic anemia ([address] )     Palpitation     Tobacco abuse      Past Surgical History:   Procedure Laterality Date    KNEE SURGERY Right     SC REMOVAL SPLEEN, TOTAL N/A 5/18/2017    Procedure: LAPAROSCOPIC HAND ASSIST SPLENECTOMY;  Surgeon: El Mckeon MD;  Location: [facility];  Service: Surgical Oncology    SHOULDER SURGERY Left      Social History   History   Alcohol Use    3 6 oz/week    6 Cans of beer per week     Comment: social     History   Drug Use No     History   Smoking Status    Light Tobacco Smoker    Types: Cigars   Smokeless Tobacco    Current User     Comment: socially     Family History: History reviewed  No pertinent family history  Meds/Allergies   current meds:   Current Facility-Administered Medications   Medication Dose Route Frequency    acetaminophen (TYLENOL) tablet 650 mg  650 mg Oral Q6H PRN    aspirin chewable tablet 81 mg  81 mg Oral Daily    cyanocobalamin (VITAMIN B-12) tablet 1,000 mcg  1,000 mcg Oral Daily    ergocalciferol (VITAMIN D2) capsule 50,000 Units  50,000 Units Oral Weekly    ferrous sulfate tablet 325 mg  325 mg Oral Daily    heparin (porcine) 25,000 units in 250 mL infusion (premix)  3-30 Units/kg/hr (Order-Specific) Intravenous Titrated    influenza inactivated quadrivalent vaccine (FLULAVAL) IM injection 0 5 mL  0 5 mL Intramuscular Prior to discharge    ondansetron (ZOFRAN) injection 4 mg  4 mg Intravenous Q6H PRN    pantoprazole (PROTONIX) EC tablet 40 mg  40 mg Oral Early Morning    predniSONE tablet 10 mg  10 mg Oral Daily    and PTA meds:    Prescriptions Prior to Admission   Medication    aspirin 81 MG tablet    cyanocobalamin (VITAMIN B-12) 1,000 mcg tablet    ergocalciferol (VITAMIN D2) 50,000 units    Ferrous Sulfate (IRON) 325 (65 FE) MG TABS    Multiple Vitamins-Minerals (ONE DAILY MENS) TABS    pantoprazole (PROTONIX) 40 mg tablet    predniSONE 20 mg tablet     Allergies   Allergen Reactions    Iodinated Diagnostic Agents Hives       Objective   Vitals: Blood pressure 139/67, pulse 81, temperature 97 7 °F (36 5 °C), temperature source Tympanic, resp   rate 20, weight 84 8 kg (187 lb), SpO2 96 %  No intake or output data in the 24 hours ending 11/07/17 1819  Invasive Devices     Peripheral Intravenous Line            Peripheral IV 11/06/17 Left Antecubital 1 day    Peripheral IV 11/07/17 Right Forearm less than 1 day                Physical Exam: General appearance: Appears well  Head: Normocephalic  Eyes: Extraocular movements intact  Ears: No gross hearing deficit  Oropharynx: Clear  Neck: Supple, No lymphadenopathy  Chest: No axillary adenopathy  Lungs: Clear to auscultation bilaterally  Heart: Regular rate and rhythm  Abdomen: No hepatic or splenic enlargement; No inguinal  lymphadenopathy  Extremities: No lower extremity edema bilaterally, there is a tender superficial vein of the medial right calf  Skin: No rashes  Neurologic: Grossly intact, no focal neurological deficit  Psychiatric: Oriented to person, place and time, normal mood and affect    Lab Results:     From November 6, 2017:  LD is 672 (), reticulocyte count 20 85%, PT INR 1 07, APTT is 23 seconds, direct Sherry is 3+ positive, CEE IgG is 3+ positive,     From November 7, 2017:  Creatinine 0 89, AST 41, ALT 40, alk-phos 85, bili 4 24, WBC 20 52, hemoglobin 6 7 with , platelets 440  CT angiogram of the chest with IV contrast from November 6, 2017: There is acute pulmonary embolism of a subsegmental branch of the right lower lobe, lungs are clear, mediastinum and hilar are unremarkable  Venous duplex of the lower extremities from November 7, 2017: Acute deep vein thrombosis is noted in the right gastrocnemius vein on the proximal calf, subacute superficial thrombophlebitis is noted from the proximal calf to the ankle  Assessment/Plan     Assessment:    1  Warm antibody mediated autoimmune hemolytic anemia, there is active hemolysis on prednisone 10 mg daily    2  Acute pulmonary embolism of a subsegmental branch of the right lower lobe on CT angiogram of November 6, 2017, currently on intravenous heparin  Prior small volume pulmonary embolism June 6, 2017 occurring 3 weeks post laparoscopic splenectomy  Plan:    Prednisone is to be increased back to 20 mg daily  In addition he may be candidate for IVIG a total of 2 g/kg every 3-4 weeks or immune suppressive therapy  The patient has already been seen in consultation by Dr Laisha Ruiz at [facility] and Dr Indigo Dudley opinion as to further treatment options is to be requested  CBC is to be monitored at least twice weekly upon hospital discharge and then less often as hemoglobin is stable or improved  The patient may resume rivaroxaban for management of recurrent acute pulmonary embolism and acute deep vein thrombosis of the gastrocnemius vein of the proximal calf  Counseling / Coordination of Care  Total floor / unit time spent today 45 minutes  Greater than 50% of total time was spent with the patient and / or family counseling and / or coordination of care  A description of the counseling / coordination of care:  Diagnostic tests, impressions and recommendations were reviewed with the patient

## 2017-11-07 NOTE — H&P
History and Physical - [facility] Internal Medicine    Patient Information: Robin Archer 60 y o  male MRN: [id number]  Unit/Bed#: ED 15 Encounter: [id number]  Admitting Physician: Toni Sanchez PA-C  PCP: Gorge Hui DO  Date of Admission:  11/06/17    Assessment/Plan:    Hospital Problem List:     Principal Problem:    Acute pulmonary embolism ([facility] [address] )  Active Problems:    Hemolytic anemia ([facility] [address] )    Shortness of breath    GERD (gastroesophageal reflux disease)      Primary Problem(s):  · Acute pulmonary embolism  · Shortness of breath x3 weeks, worsening over the past few days especially on exertion  · History of PE 6/6/17 and was anticoagulated on Xarelto for about 2 months  Taken off by hematologist   · CT chest reveals filling defect in subsegmental branch of right lower lobe pulmonary artery compatible with acute PE  · Will start on heparin drip here  · Currently O2 saturations in the high 90's  · Provide supplemental oxygen as needed to maintain sats >88%  · Monitor on telemetry  · Obtain echo to evaluate for right heart strain  · Consult Oncology/Hematology for further guidance of acute PE in the setting of autoimmune hemolytic anemia  Additional Problems:   · Autoimmune hemolytic anemia:  Follows with Dr Bakari Grey as an outpatient  Last follow-up visit was 10/17/17  Status post splenectomy 5/18/17  Has undergone course of chemotherapy  Received CBC every few weeks and prednisone dosage has been adjusted  Has been gradually taper down off of prednisone  20 mg --> 15 mg--> and is currently taking 10 mg daily  Next dosage tomorrow am  Will defer to hematology/oncology further dosing  Reticulocytes, LDH, haptoglobin, anti complement, CEE IgG, direct antiglobulin test pending  · Acute on chronic anemia:  Baseline hgb around 10 6-10 7  Hemoglobin here 8 6  Will continue to monitor and transfuse as needed if symptomatic and 7 5 or below      · Left leg pain:  Patient reports increased like pain/crampy sensation that is relatively new  Had venous Dopplers in August of 2017 which revealed no acute DVTs  Will defer to a m  team if repeat imaging of lower extremities it is necessary as management would not change given patient is currently being treated with a heparin drip for PE  Avoid SCDs  · GERD: Continue PPI      VTE Prophylaxis: Heparin Drip  / sequential compression device   Code Status:  Full code  Anticipated Length of Stay:  Patient will be admitted on an Inpatient basis with an anticipated length of stay of  greater than 2 midnights  Justification for Hospital Stay:  Acute pulmonary embolism in setting autoimmune hemolytic anemia requiring further workup and intervention    Chief Complaint:   Shortness of breath x3 weeks    History of Present Illness:    Bushra Diaz is a 60 y o  male with past medical history of GERD, PE 6/6/17, and 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  Status post splenectomy 5/18/17 and rituximab therapy  Patient follows as an outpatient for this with Dr Leanor Bumpers  Has CBC every few weeks and prednisone dosage has been continually adjusted/decreased  Currently taking 10 mg of prednisone daily  Patient presents with increasing shortness of breath had been ongoing for the past week however much more bothersome 3 days  Patient notes he has been remaining fairly active and going to the gym on a regular basis  Works out on treadmill  Over the past few days he recently noticed increasing difficulty breathing to the point where he notes "I am scared " Has had PE back in June of 2017 and reports similar feeling  Notes today he was significantly short of breath upon walking up the stairs  Associated dizziness and lightheadedness  Also complaining of pain/crampy sensation in his right lower extremity  Had venous dopplers in Aug of 2017 which were negative       Patient contacted his hematologist's office and was directed to the ER for further evaluation  In the ER, patient was noted to have an acute pulmonary embolism on CT  He has been started on a heparin drip  Denies smoking  Drinks a few beers a week in an occasional glass of bourbon  Denies any other drug use  Currently denies any chest pain or pressure, palpitations, abdominal pain, dysuria, fevers or chills, diarrhea, constipation  Review of Systems:    General:   No Fever or chills;    EENT:   No ear pain, facial swelling; No sneezing, sore throat  Skin:   No rashes, color changes  Respiratory:     + shortness of breath, cough, wheezing, stridor  Cardiovascular:     No chest pain, palpitations  Gastrointestinal:    No nausea, vomiting, diarrhea; No abdominal pain  Musculoskeletal:     No myalgias, swelling  Neurologic:   No dizziness, numbness, weakness  No speech difficulties  Psych:   No agitation,     Otherwise, All other twelve-point review of systems normal      Past Medical and Surgical History:     Past Medical History:   Diagnosis Date    Hemolytic anemia ([address] )     Palpitation     Tobacco abuse        Past Surgical History:   Procedure Laterality Date    KNEE SURGERY Right     [place] REMOVAL SPLEEN, TOTAL N/A 5/18/2017    Procedure: LAPAROSCOPIC HAND ASSIST SPLENECTOMY;  Surgeon: Sury Hoang MD;  Location: [facility];  Service: Surgical Oncology    SHOULDER SURGERY Left        Meds/Allergies:    No current facility-administered medications for this encounter        Current Outpatient Prescriptions   Medication Sig Dispense Refill    aspirin 81 MG tablet Take 81 mg by mouth daily      cyanocobalamin (VITAMIN B-12) 1,000 mcg tablet Take 1,000 mcg by mouth daily      ergocalciferol (VITAMIN D2) 50,000 units Take 50,000 Units by mouth once a week        Ferrous Sulfate (IRON) 325 (65 FE) MG TABS Take 325 mg by mouth daily        Multiple Vitamins-Minerals (ONE DAILY MENS) TABS Take by mouth      pantoprazole (PROTONIX) 40 mg tablet Take 1 tablet by mouth daily in the early morning 30 tablet 0    predniSONE 20 mg tablet Take 10 mg by mouth daily           Allergies   Allergen Reactions    Iodinated Diagnostic Agents Hives       Allergies: Allergies   Allergen Reactions    Iodinated Diagnostic Agents Hives       Social History:     Marital Status: /Civil Union     Substance Use History:   History   Alcohol Use    3 6 oz/week    6 Cans of beer per week     Comment: social     History   Smoking Status    Current Some Day Smoker    Types: Cigars   Smokeless Tobacco    Current User     Comment: socially     History   Drug Use No       Family History:    non-contributory    Physical Exam:     Vitals:   Blood Pressure: 148/67 (11/06/17 1859)  Pulse: 97 (11/06/17 1859)  Temperature: 98 1 °F (36 7 °C) (11/06/17 1509)  Temp Source: Oral (11/06/17 1509)  Respirations: 16 (11/06/17 1859)  Weight - Scale: 84 8 kg (187 lb) (11/06/17 1511)  SpO2: 96 % (11/06/17 1859)    Physical Exam   Constitutional: He is oriented to person, place, and time  He appears well-developed and well-nourished  No distress  HENT:   Head: Normocephalic  Eyes: Conjunctivae are normal    Cardiovascular: Normal rate, regular rhythm and normal heart sounds  Pulmonary/Chest: Effort normal and breath sounds normal  No respiratory distress  He has no wheezes  He has no rales  He exhibits no tenderness  Abdominal: Soft  Bowel sounds are normal  He exhibits no distension and no mass  There is no tenderness  There is no rebound and no guarding  Neurological: He is alert and oriented to person, place, and time  Skin: Skin is warm and dry  He is not diaphoretic  Psychiatric: He has a normal mood and affect  His behavior is normal  Judgment and thought content normal    Nursing note and vitals reviewed  Additional Data:     Lab Results: I have personally reviewed pertinent reports          Results from last 7 days  Lab Units 11/06/17  1559   WBC Thousand/uL 20 55*   HEMOGLOBIN g/dL 8 6*   HEMATOCRIT % 25 1*   PLATELETS Thousands/uL 516*   LYMPHO PCT % 25   MONO PCT MAN % 9   EOSINO PCT MANUAL % 1       Results from last 7 days  Lab Units 11/06/17  1559   SODIUM mmol/L 142   POTASSIUM mmol/L 4 2   CHLORIDE mmol/L 104   CO2 mmol/L 30   BUN mg/dL 23   CREATININE mg/dL 1 03   CALCIUM mg/dL 9 2   TOTAL PROTEIN g/dL 7 3   BILIRUBIN TOTAL mg/dL 4 73*   ALK PHOS U/L 113   ALT U/L 50   AST U/L 51*   GLUCOSE RANDOM mg/dL 142*       Results from last 7 days  Lab Units 11/06/17  1559   INR  0 99       Imaging: I have personally reviewed pertinent reports  Cta Ed Chest Pe Study    Result Date: 11/6/2017  Narrative: CTA - CHEST WITH IV CONTRAST - PULMONARY ANGIOGRAM INDICATION: Shortness of breath  History of pulmonary embolism  COMPARISON: June 6, 2017 TECHNIQUE: CTA examination of the chest was performed using angiographic technique according to a protocol specifically tailored to evaluate for pulmonary embolism  Reformatted images were created in axial, sagittal, and coronal planes  In addition, coronal 3D MIP postprocessing was performed on the acquisition scanner  Radiation dose length product (DLP) for this visit:  433 mGy-cm   This examination, like all CT scans performed in the [facility], was performed utilizing techniques to minimize radiation dose exposure, including the use of iterative reconstruction and automated exposure control  IV Contrast:  85 mL of iohexol (OMNIPAQUE)      FINDINGS: PULMONARY ARTERIAL TREE:  There is a filling defect in the distal branch of the right lower lobe pulmonary artery series 2 image 136 with slight enlargement of the vessel  This is new from prior study and likely represents a acute pulmonary embolism  Small peripheral emboli described in the prior study are not identified at this time  LUNGS:  Lungs are clear  There is no tracheal or endobronchial lesion  PLEURA:  Unremarkable  HEART/AORTA:  Unremarkable for patient's age   MEDIASTINUM AND BREDNA:  Unremarkable  CHEST WALL AND LOWER NECK:       Normal  VISUALIZED STRUCTURES IN THE UPPER ABDOMEN:  Surgical clips left upper quadrant  Prior splenectomy  OSSEOUS STRUCTURES:  No acute fracture or destructive osseous lesion  Impression: There is a filling defect in a subsegmental branch of the right lower lobe pulmonary artery compatible with acute pulmonary embolism  ##cfslh I personally discussed this result with Anupama Ball on 11/6/2017 5:14 PM  ##    Workstation performed: [id number]         [employer] Records Reviewed: Yes     Total Time for Visit, including Counseling / Coordination of Care: 45 minutes  Greater than 50% of this total time spent on direct patient counseling and coordination of care  ** Please Note: This note has been constructed using a voice recognition system   **

## 2017-11-07 NOTE — PLAN OF CARE
Problem: Potential for Falls  Goal: Patient will remain free of falls  INTERVENTIONS:  - Assess patient frequently for physical needs  -  Identify cognitive and physical deficits and behaviors that affect risk of falls    -  [place] fall precautions as indicated by assessment   - Educate patient/family on patient safety including physical limitations  - Instruct patient to call for assistance with activity based on assessment  - Modify environment to reduce risk of injury  - Consider OT/PT consult to assist with strengthening/mobility   Outcome: Progressing      Problem: PAIN - ADULT  Goal: Verbalizes/displays adequate comfort level or baseline comfort level  Interventions:  - Encourage patient to monitor pain and request assistance  - Assess pain using appropriate pain scale  - Administer analgesics based on type and severity of pain and evaluate response  - Implement non-pharmacological measures as appropriate and evaluate response  - Consider cultural and social influences on pain and pain management  - Notify physician/advanced practitioner if interventions unsuccessful or patient reports new pain  Outcome: Progressing      Problem: INFECTION - ADULT  Goal: Absence or prevention of progression during hospitalization  INTERVENTIONS:  - Assess and monitor for signs and symptoms of infection  - Monitor lab/diagnostic results  - Monitor all insertion sites, i e  indwelling lines, tubes, and drains  - Monitor endotracheal (as able) and nasal secretions for changes in amount and color  - [place] appropriate cooling/warming therapies per order  - Administer medications as ordered  - Instruct and encourage patient and family to use good hand hygiene technique  - Identify and instruct in appropriate isolation precautions for identified infection/condition  Outcome: Progressing      Problem: Knowledge Deficit  Goal: Patient/family/caregiver demonstrates understanding of disease process, treatment plan, medications, and discharge instructions  Complete learning assessment and assess knowledge base  Interventions:  - Provide teaching at level of understanding  - Provide teaching via preferred learning methods  Outcome: Progressing      Problem: CARDIOVASCULAR - ADULT  Goal: Maintains optimal cardiac output and hemodynamic stability  INTERVENTIONS:  - Monitor I/O, vital signs and rhythm  - Monitor for S/S and trends of decreased cardiac output i e  bleeding, hypotension  - Administer and titrate ordered vasoactive medications to optimize hemodynamic stability  - Assess quality of pulses, skin color and temperature  - Assess for signs of decreased coronary artery perfusion - ex   Angina  - Instruct patient to report change in severity of symptoms  Outcome: Progressing      Problem: RESPIRATORY - ADULT  Goal: Achieves optimal ventilation and oxygenation  INTERVENTIONS:  - Assess for changes in respiratory status  - Assess for changes in mentation and behavior  - Position to facilitate oxygenation and minimize respiratory effort  - Oxygen administration by appropriate delivery method based on oxygen saturation (per order) or ABGs  - Initiate smoking cessation education as indicated  - Encourage broncho-pulmonary hygiene including cough, deep breathe, Incentive Spirometry  - Assess the need for suctioning and aspirate as needed  - Assess and instruct to report SOB or any respiratory difficulty  - Respiratory Therapy support as indicated  Outcome: Progressing      Problem: GASTROINTESTINAL - ADULT  Goal: Maintains adequate nutritional intake  INTERVENTIONS:  - Monitor percentage of each meal consumed  - Identify factors contributing to decreased intake, treat as appropriate  - Assist with meals as needed  - Monitor I&O, WT and lab values  - Obtain nutrition services referral as needed  Outcome: Progressing      Problem: HEMATOLOGIC - ADULT  Goal: Maintains hematologic stability  INTERVENTIONS  - Assess for signs and symptoms of bleeding or hemorrhage  - Monitor labs  - Administer supportive blood products/factors as ordered and appropriate  Outcome: Progressing

## 2017-11-08 LAB
ABO GROUP BLD BPU: NORMAL
ABO GROUP BLD: NORMAL
APTT PPP: 67 SECONDS (ref 23–35)
BILIRUB UR QL STRIP: NEGATIVE
BLD GP AB SCN SERPL QL: POSITIVE
BLOOD GROUP ANTIBODIES SERPL: NORMAL
BLOOD GROUP ANTIBODIES SERPL: NORMAL
BPU ID: NORMAL
CLARITY UR: CLEAR
COLOR UR: YELLOW
CROSSMATCH: NORMAL
ERYTHROCYTE [DISTWIDTH] IN BLOOD BY AUTOMATED COUNT: 26.2 % (ref 11.6–15.1)
GLUCOSE UR STRIP-MCNC: NEGATIVE MG/DL
HAPTOGLOB SERPL-MCNC: <10 MG/DL (ref 34–200)
HCT VFR BLD AUTO: 26.4 % (ref 36.5–49.3)
HCT VFR BLD AUTO: 30 % (ref 36.5–49.3)
HGB BLD-MCNC: 8.4 G/DL (ref 12–17)
HGB BLD-MCNC: 9.8 G/DL (ref 12–17)
HGB UR QL STRIP.AUTO: NEGATIVE
KETONES UR STRIP-MCNC: NEGATIVE MG/DL
LEUKOCYTE ESTERASE UR QL STRIP: NEGATIVE
MCH RBC QN AUTO: 39.8 PG (ref 26.8–34.3)
MCHC RBC AUTO-ENTMCNC: 31.8 G/DL (ref 31.4–37.4)
MCV RBC AUTO: 125 FL (ref 82–98)
NITRITE UR QL STRIP: NEGATIVE
PH UR STRIP.AUTO: 7 [PH] (ref 4.5–8)
PLATELET # BLD AUTO: 456 THOUSANDS/UL (ref 149–390)
PMV BLD AUTO: 9.9 FL (ref 8.9–12.7)
PROT UR STRIP-MCNC: NEGATIVE MG/DL
RBC # BLD AUTO: 2.11 MILLION/UL (ref 3.88–5.62)
RH BLD: POSITIVE
SP GR UR STRIP.AUTO: 1.01 (ref 1–1.03)
SPECIMEN EXPIRATION DATE: NORMAL
UNIT DISPENSE STATUS: NORMAL
UNIT PRODUCT CODE: NORMAL
UNIT RH: NORMAL
UROBILINOGEN UR QL STRIP.AUTO: 1 E.U./DL
WBC # BLD AUTO: 18.56 THOUSAND/UL (ref 4.31–10.16)

## 2017-11-08 PROCEDURE — 81003 URINALYSIS AUTO W/O SCOPE: CPT | Performed by: INTERNAL MEDICINE

## 2017-11-08 PROCEDURE — 85014 HEMATOCRIT: CPT | Performed by: INTERNAL MEDICINE

## 2017-11-08 PROCEDURE — 85018 HEMOGLOBIN: CPT | Performed by: INTERNAL MEDICINE

## 2017-11-08 PROCEDURE — P9040 RBC LEUKOREDUCED IRRADIATED: HCPCS

## 2017-11-08 PROCEDURE — 85730 THROMBOPLASTIN TIME PARTIAL: CPT | Performed by: INTERNAL MEDICINE

## 2017-11-08 PROCEDURE — 85027 COMPLETE CBC AUTOMATED: CPT | Performed by: INTERNAL MEDICINE

## 2017-11-08 RX ORDER — PREDNISONE 20 MG/1
20 TABLET ORAL DAILY
Status: DISCONTINUED | OUTPATIENT
Start: 2017-11-08 | End: 2017-11-09 | Stop reason: HOSPADM

## 2017-11-08 RX ADMIN — PANTOPRAZOLE SODIUM 40 MG: 40 TABLET, DELAYED RELEASE ORAL at 06:16

## 2017-11-08 RX ADMIN — FERROUS SULFATE TAB 325 MG (65 MG ELEMENTAL FE) 325 MG: 325 (65 FE) TAB at 08:46

## 2017-11-08 RX ADMIN — ACETAMINOPHEN 650 MG: 325 TABLET ORAL at 15:32

## 2017-11-08 RX ADMIN — RIVAROXABAN 15 MG: 15 TABLET, FILM COATED ORAL at 17:59

## 2017-11-08 RX ADMIN — HEPARIN SODIUM AND DEXTROSE 18 UNITS/KG/HR: 10000; 5 INJECTION INTRAVENOUS at 09:52

## 2017-11-08 RX ADMIN — ASPIRIN 81 MG 81 MG: 81 TABLET ORAL at 08:46

## 2017-11-08 RX ADMIN — CYANOCOBALAMIN TAB 500 MCG 1000 MCG: 500 TAB at 08:46

## 2017-11-08 RX ADMIN — PREDNISONE 20 MG: 20 TABLET ORAL at 12:37

## 2017-11-08 NOTE — PLAN OF CARE
CARDIOVASCULAR - ADULT     Maintains optimal cardiac output and hemodynamic stability Progressing        GASTROINTESTINAL - ADULT     Maintains adequate nutritional intake Progressing        HEMATOLOGIC - ADULT     Maintains hematologic stability Progressing        INFECTION - ADULT     Absence or prevention of progression during hospitalization Progressing        Knowledge Deficit     Patient/family/caregiver demonstrates understanding of disease process, treatment plan, medications, and discharge instructions Progressing        PAIN - ADULT     Verbalizes/displays adequate comfort level or baseline comfort level Progressing        Potential for Falls     Patient will remain free of falls Progressing        RESPIRATORY - ADULT     Achieves optimal ventilation and oxygenation Progressing

## 2017-11-08 NOTE — PROGRESS NOTES
Progress Note -  Internal Medicine / Hospitalists  Teto Cazares 60 y o  male MRN: [id number]  Unit/Bed#: [address] [facility] 2 -01 Encounter: [id number]      ASSESSMENT AND PLAN:  1  Acute pulmonary embolism with acute right gastrocnemius DVT-appreciate oncology input in the setting of hemolytic anemia  Recommend discontinuing heparin drip and resuming Xarelto  Echocardiogram no evidence of right heart strain  2  Autoimmune hemolytic anemia-oncology recommends increasing prednisone back to 20 mg daily  The patient may be a candidate for IVIG or immune suppressive therapy  The patient sees Dr Keke Newell at [facility] and is followed locally with Dr Latrice Kapadia  Hemoglobin improved to 9 8  CBC twice weekly upon discharge  3  Leukocytosis secondary to the prednisone  No signs of infection  4   GERD continue PPI  5  Tobacco abuse-the patient was strongly counseled that he must stop smoking forever  6  Headache - APAP        VTE Prophylaxis: Xarelto    Dispo: If patient tolerates the transition to Xarelto without any bleeding events, and hemoglobin stable, consider discharge tomorrow th  ______________________________________________________________________    SUBJECTIVE:   Patient seen and examined  C/o R calf pain & headache  No cp/sob/palp/f/c/abdo pain/n/v/d/constipation  Did have dark urine this am without dysuria or visible blood   No dark stools or blood in BM    OBJECTIVE:   Principal Problem:    Acute pulmonary embolism (HCC)  Active Problems:    Tobacco abuse    Hemolytic anemia (HCC)    Shortness of breath    GERD (gastroesophageal reflux disease)      Vitals:   HR:  [69-81] 72  Resp:  [16-20] 16  BP: (128-149)/(60-79) 149/70  SpO2:  [96 %-100 %] 100 %  Temp (24hrs), Av 7 °F (36 5 °C), Min:97 5 °F (36 4 °C), Max:98 1 °F (36 7 °C)  Current: Temperature: 98 1 °F (36 7 °C)    Intake/Output Summary (Last 24 hours) at 17 1500  Last data filed at 17 1300   Gross per 24 hour   Intake              682 ml   Output              325 ml   Net              357 ml       Physical Exam:     General Appearance:    Alert, cooperative, no distress   Head:    Normocephalic, without obvious abnormality, atraumatic   Eyes:    Conjunctiva/corneas clear, EOM's intact       Neck:   Supple, no adenopathy, no JVD   Back:     Symmetric, no curvature, ROM normal, no CVA tenderness   Lungs:     Coarse but clear to auscultation bilaterally, no wheezing or rhonchi   Heart:    Regular rate and rhythm, S1 and S2 normal, no murmur, rub   or gallop   Abdomen:     Soft, non-tender, bowel sounds active    Extremities:   R calf tenderness with <1cm tender nodularity, no erythema, alexa's positive; L alexa's negative   Psych:   Normal Affect   Neurologic:   CNII-XII intact  Normal strength, sensation and reflexes       Throughout     Lab, Imaging and other studies:      Results from last 7 days  Lab Units 11/08/17  1058 11/08/17  0521  11/06/17  2252   WBC Thousand/uL  --  18 56*  < > 18 98*   HEMOGLOBIN g/dL 9 8* 8 4*  < > 7 1*   HEMATOCRIT % 30 0* 26 4*  < > 22 2*   PLATELETS Thousands/uL  --  456*  < > 460*   INR   --   --   --  1 06   < > = values in this interval not displayed      Results from last 7 days  Lab Units 11/07/17  0544   SODIUM mmol/L 143   POTASSIUM mmol/L 3 5   CHLORIDE mmol/L 108   CO2 mmol/L 27   BUN mg/dL 18   CREATININE mg/dL 0 89   CALCIUM mg/dL 8 5   TOTAL PROTEIN g/dL 5 9*   BILIRUBIN TOTAL mg/dL 4 24*   ALK PHOS U/L 84   ALT U/L 40   AST U/L 41   GLUCOSE RANDOM mg/dL 105       Results from last 7 days  Lab Units 11/06/17  1559   TROPONIN I ng/mL <0 02     Lab Results   Component Value Date    URINECX No Growth <1000 cfu/mL 11/02/2016       Scheduled Meds:    aspirin 81 mg Oral Daily   cyanocobalamin 1,000 mcg Oral Daily   ergocalciferol 50,000 Units Oral Weekly   ferrous sulfate 325 mg Oral Daily   pantoprazole 40 mg Oral Early Morning   predniSONE 20 mg Oral Daily Continuous Infusions:    heparin (porcine) 3-30 Units/kg/hr (Order-Specific) Last Rate: 18 Units/kg/hr (11/08/17 5070)       PRN Meds:    acetaminophen    influenza vaccine    ondansetron      Counseling / Coordination of Care  Total floor / unit time spent today 30 minutes  minutes  Greater than 50% of total time was spent with the patient and / or family counseling and / or coordination of care   A description of the counseling / coordination of care: review pmh & data since admit; coordinate plan of care with patient nursing; review case with IM Attending Dr Jazzy Colbert      This note has been constructed using a voice recognition system

## 2017-11-08 NOTE — DISCHARGE INSTRUCTIONS
Continue Xarelto and prednisone as prescribed  Have cbc checked 2x per week   Follow up with Dr Iva Marie

## 2017-11-09 ENCOUNTER — GENERIC CONVERSION - ENCOUNTER (OUTPATIENT)
Dept: OTHER | Facility: OTHER | Age: 62
End: 2017-11-09

## 2017-11-09 VITALS
DIASTOLIC BLOOD PRESSURE: 76 MMHG | BODY MASS INDEX: 28.37 KG/M2 | WEIGHT: 187 LBS | RESPIRATION RATE: 16 BRPM | SYSTOLIC BLOOD PRESSURE: 147 MMHG | HEART RATE: 69 BPM | TEMPERATURE: 98 F | OXYGEN SATURATION: 96 %

## 2017-11-09 PROBLEM — R06.02 SHORTNESS OF BREATH: Status: RESOLVED | Noted: 2017-11-06 | Resolved: 2017-11-09

## 2017-11-09 LAB
ABO GROUP BLD BPU: NORMAL
BPU ID: NORMAL
CROSSMATCH: NORMAL
HCT VFR BLD AUTO: 27.2 % (ref 36.5–49.3)
HGB BLD-MCNC: 9.6 G/DL (ref 12–17)
UNIT DISPENSE STATUS: NORMAL
UNIT PRODUCT CODE: NORMAL
UNIT RH: NORMAL

## 2017-11-09 PROCEDURE — 85014 HEMATOCRIT: CPT | Performed by: PHYSICIAN ASSISTANT

## 2017-11-09 PROCEDURE — 85018 HEMOGLOBIN: CPT | Performed by: PHYSICIAN ASSISTANT

## 2017-11-09 PROCEDURE — 90686 IIV4 VACC NO PRSV 0.5 ML IM: CPT | Performed by: INTERNAL MEDICINE

## 2017-11-09 RX ORDER — PREDNISONE 20 MG/1
20 TABLET ORAL DAILY
Qty: 60 TABLET | Refills: 0 | Status: SHIPPED | OUTPATIENT
Start: 2017-11-10 | End: 2018-07-17 | Stop reason: DRUGHIGH

## 2017-11-09 RX ADMIN — ASPIRIN 81 MG 81 MG: 81 TABLET ORAL at 08:49

## 2017-11-09 RX ADMIN — CYANOCOBALAMIN TAB 500 MCG 1000 MCG: 500 TAB at 08:48

## 2017-11-09 RX ADMIN — FERROUS SULFATE TAB 325 MG (65 MG ELEMENTAL FE) 325 MG: 325 (65 FE) TAB at 08:49

## 2017-11-09 RX ADMIN — PANTOPRAZOLE SODIUM 40 MG: 40 TABLET, DELAYED RELEASE ORAL at 05:18

## 2017-11-09 RX ADMIN — RIVAROXABAN 15 MG: 15 TABLET, FILM COATED ORAL at 08:49

## 2017-11-09 RX ADMIN — PREDNISONE 20 MG: 20 TABLET ORAL at 08:49

## 2017-11-09 RX ADMIN — INFLUENZA VIRUS VACCINE 0.5 ML: 15; 15; 15; 15 SUSPENSION INTRAMUSCULAR at 12:48

## 2017-11-09 NOTE — SOCIAL WORK
Patient going home on Xarelto; provided 30 day free trial card and insurance co pay cards for patient to help with costs of meds  Patient has transport home

## 2017-11-09 NOTE — PLAN OF CARE
Problem: DISCHARGE PLANNING - CARE MANAGEMENT  Goal: Discharge to post-acute care or home with appropriate resources  INTERVENTIONS:  - Conduct assessment to determine patient/family and health care team treatment goals, and need for post-acute services based on payer coverage, community resources, and patient preferences, and barriers to discharge  - Address psychosocial, clinical, and financial barriers to discharge as identified in assessment in conjunction with the patient/family and health care team  - Arrange appropriate level of post-acute services according to patients   needs and preference and payer coverage in collaboration with the physician and health care team  - Communicate with and update the patient/family, physician, and health care team regarding progress on the discharge plan  - Arrange appropriate transportation to post-acute venues   Outcome: Adequate for Discharge  Patient provided Xarelto free 30 day trial and co pay card to help with costs of meds

## 2017-11-13 ENCOUNTER — GENERIC CONVERSION - ENCOUNTER (OUTPATIENT)
Dept: OTHER | Facility: OTHER | Age: 62
End: 2017-11-13

## 2017-11-13 ENCOUNTER — LAB (OUTPATIENT)
Dept: LAB | Facility: MEDICAL CENTER | Age: 62
End: 2017-11-13
Payer: COMMERCIAL

## 2017-11-13 DIAGNOSIS — D59.19 OTHER AUTOIMMUNE HEMOLYTIC ANEMIAS: ICD-10-CM

## 2017-11-13 DIAGNOSIS — R06.02 SOB (SHORTNESS OF BREATH): ICD-10-CM

## 2017-11-13 DIAGNOSIS — D58.9: ICD-10-CM

## 2017-11-13 LAB
BASOPHILS # BLD AUTO: 0.09 THOUSANDS/ΜL (ref 0–0.1)
BASOPHILS NFR BLD AUTO: 1 % (ref 0–1)
EOSINOPHIL # BLD AUTO: 0.44 THOUSAND/ΜL (ref 0–0.61)
EOSINOPHIL NFR BLD AUTO: 3 % (ref 0–6)
ERYTHROCYTE [DISTWIDTH] IN BLOOD BY AUTOMATED COUNT: 19.7 % (ref 11.6–15.1)
HCT VFR BLD AUTO: 32 % (ref 36.5–49.3)
HGB BLD-MCNC: 10.5 G/DL (ref 12–17)
LYMPHOCYTES # BLD AUTO: 3.35 THOUSANDS/ΜL (ref 0.6–4.47)
LYMPHOCYTES NFR BLD AUTO: 26 % (ref 14–44)
MCH RBC QN AUTO: 39.2 PG (ref 26.8–34.3)
MCHC RBC AUTO-ENTMCNC: 32.8 G/DL (ref 31.4–37.4)
MCV RBC AUTO: 119 FL (ref 82–98)
MONOCYTES # BLD AUTO: 1.22 THOUSAND/ΜL (ref 0.17–1.22)
MONOCYTES NFR BLD AUTO: 10 % (ref 4–12)
NEUTROPHILS # BLD AUTO: 7.65 THOUSANDS/ΜL (ref 1.85–7.62)
NEUTS SEG NFR BLD AUTO: 60 % (ref 43–75)
NRBC BLD AUTO-RTO: 3 /100 WBCS
PLATELET # BLD AUTO: 617 THOUSANDS/UL (ref 149–390)
PMV BLD AUTO: 10 FL (ref 8.9–12.7)
RBC # BLD AUTO: 2.68 MILLION/UL (ref 3.88–5.62)
WBC # BLD AUTO: 12.81 THOUSAND/UL (ref 4.31–10.16)

## 2017-11-13 PROCEDURE — 85025 COMPLETE CBC W/AUTO DIFF WBC: CPT

## 2017-11-13 PROCEDURE — 36415 COLL VENOUS BLD VENIPUNCTURE: CPT

## 2017-11-20 ENCOUNTER — ALLSCRIPTS OFFICE VISIT (OUTPATIENT)
Dept: OTHER | Facility: OTHER | Age: 62
End: 2017-11-20

## 2017-11-20 ENCOUNTER — LAB (OUTPATIENT)
Dept: LAB | Facility: MEDICAL CENTER | Age: 62
End: 2017-11-20
Payer: COMMERCIAL

## 2017-11-20 DIAGNOSIS — D59.19 OTHER AUTOIMMUNE HEMOLYTIC ANEMIAS: ICD-10-CM

## 2017-11-20 LAB
ANISOCYTOSIS BLD QL SMEAR: PRESENT
BASOPHILS # BLD MANUAL: 0 THOUSAND/UL (ref 0–0.1)
BASOPHILS NFR MAR MANUAL: 0 % (ref 0–1)
EOSINOPHIL # BLD MANUAL: 0.55 THOUSAND/UL (ref 0–0.4)
EOSINOPHIL NFR BLD MANUAL: 4 % (ref 0–6)
ERYTHROCYTE [DISTWIDTH] IN BLOOD BY AUTOMATED COUNT: 20.8 % (ref 11.6–15.1)
GIANT PLATELETS BLD QL SMEAR: PRESENT
HCT VFR BLD AUTO: 30.9 % (ref 36.5–49.3)
HGB BLD-MCNC: 9.5 G/DL (ref 12–17)
LYMPHOCYTES # BLD AUTO: 18 % (ref 14–44)
LYMPHOCYTES # BLD AUTO: 2.48 THOUSAND/UL (ref 0.6–4.47)
MACROCYTES BLD QL AUTO: PRESENT
MCH RBC QN AUTO: 39.7 PG (ref 26.8–34.3)
MCHC RBC AUTO-ENTMCNC: 30.7 G/DL (ref 31.4–37.4)
MCV RBC AUTO: 129 FL (ref 82–98)
METAMYELOCYTES NFR BLD MANUAL: 1 % (ref 0–1)
MONOCYTES # BLD AUTO: 0.83 THOUSAND/UL (ref 0–1.22)
MONOCYTES NFR BLD: 6 % (ref 4–12)
NEUTROPHILS # BLD MANUAL: 8.25 THOUSAND/UL (ref 1.85–7.62)
NEUTS BAND NFR BLD MANUAL: 8 % (ref 0–8)
NEUTS SEG NFR BLD AUTO: 52 % (ref 43–75)
NRBC BLD AUTO-RTO: 13 /100 WBC (ref 0–2)
NRBC BLD AUTO-RTO: 13 /100 WBCS
PAPPENHEIMER BOD BLD QL SMEAR: PRESENT
PLATELET # BLD AUTO: 703 THOUSANDS/UL (ref 149–390)
PLATELET BLD QL SMEAR: ABNORMAL
PMV BLD AUTO: 9.9 FL (ref 8.9–12.7)
POIKILOCYTOSIS BLD QL SMEAR: PRESENT
POLYCHROMASIA BLD QL SMEAR: PRESENT
PROMYELOCYTES NFR BLD MANUAL: 1 % (ref 0–0)
RBC # BLD AUTO: 2.39 MILLION/UL (ref 3.88–5.62)
RBC MORPH BLD: PRESENT
TARGETS BLD QL SMEAR: PRESENT
TOXIC GRANULES BLD QL SMEAR: PRESENT
VARIANT LYMPHS # BLD AUTO: 10 %
WBC # BLD AUTO: 13.75 THOUSAND/UL (ref 4.31–10.16)
WBC TOXIC VACUOLES BLD QL SMEAR: PRESENT

## 2017-11-20 PROCEDURE — 85007 BL SMEAR W/DIFF WBC COUNT: CPT

## 2017-11-20 PROCEDURE — 36415 COLL VENOUS BLD VENIPUNCTURE: CPT

## 2017-11-20 PROCEDURE — 85027 COMPLETE CBC AUTOMATED: CPT

## 2017-11-21 ENCOUNTER — GENERIC CONVERSION - ENCOUNTER (OUTPATIENT)
Dept: OTHER | Facility: OTHER | Age: 62
End: 2017-11-21

## 2017-11-21 NOTE — PROGRESS NOTES
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)    Plan  Autoimmune hemolytic anemia    · PredniSONE 2 5 MG Oral Tablet; take as directed   Rx By: Kamar Kyle; Dispense: 30 Days ; #:60 Tablet; Refill: 5;For: Autoimmune hemolytic anemia; ANIRUDH = N; Verified Transmission to [facility] #182; Last Updated By: System, [employer]; 2017 3:32:53 PM   · Drink plenty of fluids ; Status:Complete;   Done: [id number]   Ordered; For:Autoimmune hemolytic anemia; Ordered By:Rivera Bedolla;   · (1) CBC/ PLT (NO DIFF); Status:Active; Requested for:2017;    Perform:[facility]; [id number]; Last Updated By:Kadeem Fields; 2017 3:33:03 PM;Ordered; For:Autoimmune hemolytic anemia; Ordered By:Rivera Bedolla;   · (1) CBC/ PLT (NO DIFF); Status:Active; Requested for:2017;    Perform:[facility]; Due:[id number]; Last Updated By:Kadeem Fields; 2017 3:33:30 PM;Ordered; For:Autoimmune hemolytic anemia; Ordered By:Rivera Bedolla;   · (1) CBC/ PLT (NO DIFF); Status:Active; Requested for:2017;    Perform:[facility]; KFR:[id number]; Ordered; For:Autoimmune hemolytic anemia; Ordered By:Rivera Bedolla;   · (1) CBC/ PLT (NO DIFF); Status:Active; Requested for:2017;    Perform:[facility]; WBI:[id number]; Last Updated By:Kadeem Fields; 2017 3:32:53 PM;Ordered;hemolytic anemia; Ordered By:Rivera Bedolla;   · (1) CBC/ PLT (NO DIFF); Status: In Progress - Order Generated; Requested for:RecurringSchedule: 2017; 2017; 2017; 2017; 2017; 2017;2018;   ;    Perform:[facility]; [id number]; Ordered;hemolytic anemia; Ordered By:Baylee Bedolla;   · Follow-up visit in 2 months Evaluation and Treatment  Follow-up  Status: Complete Done: [id number] 02:20PM   Ordered; Autoimmune hemolytic anemia; Ordered By: Kamar Kyle Performed:  Due: [id number]; Last Updated By: Ernie Malone; 2017 3:30:47 PM    Discussion/Summary  Discussion Summary:    In summary, this is a 25-year-old male history of autoimmune hemolytic anemia as outlined had [facility]  They essentially made similar recommendations and diagnoses as had previously been established  was recently hospitalized for right leg pain  He was found to have a DVT and small pulmonary embolism  Xarelto anticoagulation was re-initiated  anticoagulation is recommended had a elevated reticular count during his hospitalization, 20%  This is consistent with a proliferative hemolytic process and indicates peripheral destruction without any evidence of productive problems  will continue on prednisone at 20 mg per day and decreased by 2 5 mg per day each week, depending upon hemoglobin values  testing for PNH is advocated  reviewed the above with the patient and his wife  They voiced understanding and agreement  Counseling Documentation With Imm: The patient, patient's family was counseled regarding diagnostic results,-- instructions for management,-- patient and family education,-- impressions  total time of encounter was 25 minutes  Chief Complaint  Chief Complaint Free Text Note Form: Follow-up regarding autoimmune hemolytic anemia  History of Present Illness  HPI: 10/31/16, patient's family noted jaundice    Patient reported that he had been feeling poorly since mid September  He was starting to feel weak, was experiencing LOWE, his legs were heavy  In [place], he states there is known water contamination and there have been instances of food contamination  Air conditioning units have been faulty in the past to SLA 11/2 â 11/4/16 secondary to fatigue, SOB, yellowed skin  His hemoglobin was 5 7; after 1 L fluid, it decreased to 4 6 g/dL  His total bilrubin was 5 38; otherwise normal LFTs  HSM noted; spleen 16 2cm; no evidence of lymphoma; no obstructive pathology  Prior to transfusion PRBCS, he had CEE, peripheral smear, LDH    CEE was +3 positive for IgG  Vitamin B12 904  HIV negative  2 5, , platelet count 538, 66 neutrophils, 22 lymphocytes, 4 monos, 7 eosinophils, one nucleated red cell  For his autoimmune hemolytic anemia, he was started on prednisone 1mg/kg/day  He was discharged home on prednisone 20mg (2) po bidHis hemoglobin was 12 3, ; WBC 10 8, platelets 555  LDH 11/3/16 was 548over following weeks and DC'd altogether with last dose January 6, 2016 2017âRituxan 375 mg/mÂ² every week Ã4 underwent splenectomy  Open  Hosp for 5 days  Hypokalemia, anemia down to 7 6, hypertension  All improved and was DC'd home  Spleen showed no pathologic abnormalities  2017âpatient developed increased shortness of breath  CT of the chest showed small peripheral pulmonary emboli  Patient was started on Xarelto  G6PD level was elevated  PNH testing was negative  Current Therapy: Prednisone 20 mg po qd  Interval History: Recent URI under treatment with gradual improvement  Better after transfusion but not back to normal       Review of Systems  Complete-Male:  Constitutional: as noted in HPI  Eyes: No complaints of eye pain, no red eyes, no discharge from eyes, no itchy eyes  ENT: no complaints of earache, no hearing loss, no nosebleeds, no nasal discharge, no sore throat, no hoarseness  Cardiovascular: No complaints of slow heart rate, no fast heart rate, no chest pain, no palpitations, no leg claudication, no lower extremity  Respiratory: No complaints of shortness of breath, no wheezing, no cough, no SOB on exertion, no orthopnea or PND  Gastrointestinal: as noted in HPI  Genitourinary: No complaints of dysuria, no incontinence, no hesitancy, no nocturia, no genital lesion, no testicular pain  Musculoskeletal: No complaints of arthralgia, no myalgias, no joint swelling or stiffness, no limb pain or swelling  Integumentary: No complaints of skin rash or skin lesions, no itching, no skin wound, no dry skin    Neurological: No compliants of headache, no confusion, no convulsions, no numbness or tingling, no dizziness or fainting, no limb weakness, no difficulty walking  Psychiatric: Is not suicidal, no sleep disturbances, no anxiety or depression, no change in personality, no emotional problems  Endocrine: No complaints of proptosis, no hot flashes, no muscle weakness, no erectile dysfunction, no deepening of the voice, no feelings of weakness  Hematologic/Lymphatic: No complaints of swollen glands, no swollen glands in the neck, does not bleed easily, no easy bruising  Active Problems  1  Acute bronchitis (466 0) (J20 9)   2  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)   3  Bilateral calf pain (729 5) (M79 661,M79 662)   4  Calf pain (729 5) (M79 669)   5  History of allergy (V15 09) (Z88 9)   6  Pulmonary embolism (415 19) (I26 99)   7  Shortness of breath (786 05) (R06 02)   8  Splenomegaly (789 2) (R16 1)   9  Strain of lumbar region, initial encounter (847 2) (A17 971W)    Past Medical History  1  History of Dyspnea on exertion (786 09) (R06 09)   2  History of Encounter for pre-employment examination (V70 5) (Z02 1)   3  History of acute bronchitis (V12 69) (Z87 09)   4  History of acute pharyngitis (V12 69) (Z87 09)   5  History of acute sinusitis (V12 69) (Z87 09)   6  History of hemolytic anemia (V12 3) (Z86 2)   7  History of jaundice (V12 29) (Z87 898)   8  History of Preoperative examination (V72 84) (P09 189)    Surgical History  1  History of Arthroscopy Knee Right   2  History of Arthroscopy Shoulder Left   3  History of Elbow Arthroplasty   4  History of Shoulder Surgery    Family History  Mother    1  Family history of Breast Cancer (V16 3)  Father    2  Family history of CABG  Paternal Grandfather    3   Family history of Acute Myocardial Infarction (V17 3)    Social History     · Being A Social Drinker   · Cigars (___ A Day) (305 1)   · Current occasional smoker (305 1) (Z72 0)   · Daily Coffee Consumption (3  Cups/Day)   · Never a smoker    Current Meds   1  Aspirin 81 MG CAPS; Therapy: (Recorded:02Nov2016) to Recorded   2  Calcium TABS; Therapy: (Recorded:20Nov2017) to Recorded   3  Ergocalciferol 51710 UNIT CAPS; Therapy: (Recorded:14Feb2017) to Recorded   4  Ferrous Sulfate 325 (65 Fe) MG Oral Tablet; Therapy: (Recorded:14Feb2017) to Recorded   5  One Daily Mens TABS; Therapy: (Recorded:81Ezu3609) to Recorded   6  Pantoprazole Sodium 40 MG Oral Tablet Delayed Release; Therapy: (Recorded:14Feb2017) to Recorded   7  PredniSONE 20 MG Oral Tablet; TAKE 1 TABLET DAILY; Therapy: (Taylor Mariano) to Recorded   8  Vitamin B-12 1000 MCG Oral Tablet; Therapy: (Recorded:14Feb2017) to Recorded   9  Xarelto 20 MG Oral Tablet; 1 by mouth twice daily; Therapy: (Recorded:20Nov2017) to Recorded    Allergies  1  No Known Drug Allergies    Vitals  Vital Signs    Recorded: 20Nov2017 03:07PM   Temperature 97 8 F   Heart Rate 80   Respiration 16   Systolic 635   Diastolic 84   Height 5 ft 8 in   Weight 195 lb 6 oz   BMI Calculated 29 71   BSA Calculated 2 02   O2 Saturation 98   Pain Scale 0       Physical Exam   Constitutional  General appearance: No acute distress, well appearing and well nourished  Eyes  Conjunctiva and lids: No swelling, erythema, or discharge  Pupils and irises: Equal, round and reactive to light  Ears, Nose, Mouth, and Throat  External inspection of ears and nose: Normal    Nasal mucosa, septum, and turbinates: Normal without edema or erythema  Oropharynx: Normal with no erythema, edema, exudate or lesions  Pulmonary  Respiratory effort: No increased work of breathing or signs of respiratory distress  Auscultation of lungs: Clear to auscultation, equal breath sounds bilaterally, no wheezes, no rales, no rhonci  Cardiovascular  Auscultation of heart: Normal rate and rhythm, normal S1 and S2, without murmurs  Examination of extremities for edema and/or varicosities: Normal    Abdomen  Abdomen: Non-tender, no masses     Lymphatic  Palpation of lymph nodes in neck: No lymphadenopathy  Musculoskeletal  Gait and station: Normal    Skin  Skin and subcutaneous tissue: Normal without rashes or lesions  Neurologic  Cranial nerves: Cranial nerves 2-12 intact  Psychiatric  Orientation to person, place and time: Normal          Future Appointments    Date/Time Provider Specialty Site   12/18/2017 11:15 AM SHADY Velasquez , DO, [facility] Hematology Oncology [address] [place] [address] Rd       Signatures   Electronically signed by : SHADY Miller  Nov 20 2017  3:35PM EST                       (Author)

## 2017-12-04 ENCOUNTER — GENERIC CONVERSION - ENCOUNTER (OUTPATIENT)
Dept: OTHER | Facility: OTHER | Age: 62
End: 2017-12-04

## 2017-12-04 ENCOUNTER — LAB (OUTPATIENT)
Dept: LAB | Facility: MEDICAL CENTER | Age: 62
End: 2017-12-04
Payer: COMMERCIAL

## 2017-12-04 DIAGNOSIS — D59.19 OTHER AUTOIMMUNE HEMOLYTIC ANEMIAS: ICD-10-CM

## 2017-12-04 LAB
ERYTHROCYTE [DISTWIDTH] IN BLOOD BY AUTOMATED COUNT: 15.5 % (ref 11.6–15.1)
HCT VFR BLD AUTO: 29.3 % (ref 36.5–49.3)
HGB BLD-MCNC: 9.5 G/DL (ref 12–17)
MCH RBC QN AUTO: 41.3 PG (ref 26.8–34.3)
MCHC RBC AUTO-ENTMCNC: 32.4 G/DL (ref 31.4–37.4)
MCV RBC AUTO: 127 FL (ref 82–98)
PLATELET # BLD AUTO: 533 THOUSANDS/UL (ref 149–390)
PMV BLD AUTO: 9.7 FL (ref 8.9–12.7)
RBC # BLD AUTO: 2.3 MILLION/UL (ref 3.88–5.62)
WBC # BLD AUTO: 12.11 THOUSAND/UL (ref 4.31–10.16)

## 2017-12-04 PROCEDURE — 36415 COLL VENOUS BLD VENIPUNCTURE: CPT

## 2017-12-04 PROCEDURE — 85027 COMPLETE CBC AUTOMATED: CPT

## 2017-12-12 ENCOUNTER — GENERIC CONVERSION - ENCOUNTER (OUTPATIENT)
Dept: OTHER | Facility: OTHER | Age: 62
End: 2017-12-12

## 2017-12-12 ENCOUNTER — TRANSCRIBE ORDERS (OUTPATIENT)
Dept: ADMINISTRATIVE | Facility: HOSPITAL | Age: 62
End: 2017-12-12

## 2017-12-12 ENCOUNTER — LAB (OUTPATIENT)
Dept: LAB | Facility: MEDICAL CENTER | Age: 62
End: 2017-12-12
Payer: COMMERCIAL

## 2017-12-12 DIAGNOSIS — D59.19 OTHER AUTOIMMUNE HEMOLYTIC ANEMIAS: ICD-10-CM

## 2017-12-12 LAB
ERYTHROCYTE [DISTWIDTH] IN BLOOD BY AUTOMATED COUNT: 17.1 % (ref 11.6–15.1)
HCT VFR BLD AUTO: 28 % (ref 36.5–49.3)
HGB BLD-MCNC: 8.8 G/DL (ref 12–17)
MCH RBC QN AUTO: 40.9 PG (ref 26.8–34.3)
MCHC RBC AUTO-ENTMCNC: 31.4 G/DL (ref 31.4–37.4)
MCV RBC AUTO: 130 FL (ref 82–98)
PLATELET # BLD AUTO: 574 THOUSANDS/UL (ref 149–390)
PMV BLD AUTO: 9.5 FL (ref 8.9–12.7)
RBC # BLD AUTO: 2.15 MILLION/UL (ref 3.88–5.62)
WBC # BLD AUTO: 9.94 THOUSAND/UL (ref 4.31–10.16)

## 2017-12-12 PROCEDURE — 85027 COMPLETE CBC AUTOMATED: CPT

## 2017-12-13 ENCOUNTER — GENERIC CONVERSION - ENCOUNTER (OUTPATIENT)
Dept: OTHER | Facility: OTHER | Age: 62
End: 2017-12-13

## 2017-12-14 ENCOUNTER — APPOINTMENT (OUTPATIENT)
Dept: LAB | Facility: CLINIC | Age: 62
End: 2017-12-14
Payer: COMMERCIAL

## 2017-12-14 ENCOUNTER — GENERIC CONVERSION - ENCOUNTER (OUTPATIENT)
Dept: OTHER | Facility: OTHER | Age: 62
End: 2017-12-14

## 2017-12-14 ENCOUNTER — TRANSCRIBE ORDERS (OUTPATIENT)
Dept: LAB | Facility: CLINIC | Age: 62
End: 2017-12-14

## 2017-12-14 DIAGNOSIS — D59.19 ANTIBODY-MEDIATED ANEMIA (HCC): ICD-10-CM

## 2017-12-14 DIAGNOSIS — D59.19 ANTIBODY-MEDIATED ANEMIA (HCC): Primary | ICD-10-CM

## 2017-12-14 LAB
ABO GROUP BLD: NORMAL
BLD GP AB SCN SERPL QL: POSITIVE
BLOOD GROUP ANTIBODIES SERPL: NORMAL
BLOOD GROUP ANTIBODIES SERPL: NORMAL
RH BLD: POSITIVE
SPECIMEN EXPIRATION DATE: NORMAL

## 2017-12-14 PROCEDURE — 36415 COLL VENOUS BLD VENIPUNCTURE: CPT

## 2017-12-14 PROCEDURE — 86922 COMPATIBILITY TEST ANTIGLOB: CPT

## 2017-12-14 PROCEDURE — 86901 BLOOD TYPING SEROLOGIC RH(D): CPT

## 2017-12-14 PROCEDURE — 86850 RBC ANTIBODY SCREEN: CPT

## 2017-12-14 PROCEDURE — 86921 COMPATIBILITY TEST INCUBATE: CPT

## 2017-12-14 PROCEDURE — 86900 BLOOD TYPING SEROLOGIC ABO: CPT

## 2017-12-14 PROCEDURE — 86870 RBC ANTIBODY IDENTIFICATION: CPT

## 2017-12-14 PROCEDURE — 86902 BLOOD TYPE ANTIGEN DONOR EA: CPT

## 2017-12-15 ENCOUNTER — GENERIC CONVERSION - ENCOUNTER (OUTPATIENT)
Dept: OTHER | Facility: OTHER | Age: 62
End: 2017-12-15

## 2017-12-15 ENCOUNTER — HOSPITAL ENCOUNTER (OUTPATIENT)
Dept: INFUSION CENTER | Facility: CLINIC | Age: 62
Discharge: HOME | End: 2017-12-15
Payer: COMMERCIAL

## 2017-12-15 VITALS
RESPIRATION RATE: 16 BRPM | DIASTOLIC BLOOD PRESSURE: 78 MMHG | TEMPERATURE: 98.3 F | SYSTOLIC BLOOD PRESSURE: 134 MMHG | HEART RATE: 80 BPM

## 2017-12-15 PROCEDURE — P9040 RBC LEUKOREDUCED IRRADIATED: HCPCS

## 2017-12-15 PROCEDURE — 36430 TRANSFUSION BLD/BLD COMPNT: CPT

## 2017-12-15 NOTE — PROGRESS NOTES
Pt  Verbalizes SOB, fatigue, dizziness and headaches today  2 units of PRBCs ordered for infusion today  Hgb 8 8

## 2017-12-16 LAB
ABO GROUP BLD BPU: NORMAL
ABO GROUP BLD BPU: NORMAL
BPU ID: NORMAL
BPU ID: NORMAL
CROSSMATCH: NORMAL
CROSSMATCH: NORMAL
UNIT DISPENSE STATUS: NORMAL
UNIT DISPENSE STATUS: NORMAL
UNIT PRODUCT CODE: NORMAL
UNIT PRODUCT CODE: NORMAL
UNIT RH: NORMAL
UNIT RH: NORMAL

## 2017-12-18 ENCOUNTER — LAB (OUTPATIENT)
Dept: LAB | Facility: MEDICAL CENTER | Age: 62
End: 2017-12-18
Payer: COMMERCIAL

## 2017-12-18 ENCOUNTER — GENERIC CONVERSION - ENCOUNTER (OUTPATIENT)
Dept: OTHER | Facility: OTHER | Age: 62
End: 2017-12-18

## 2017-12-18 DIAGNOSIS — D59.19 OTHER AUTOIMMUNE HEMOLYTIC ANEMIAS: ICD-10-CM

## 2017-12-18 LAB
ERYTHROCYTE [DISTWIDTH] IN BLOOD BY AUTOMATED COUNT: 20.5 % (ref 11.6–15.1)
HCT VFR BLD AUTO: 34.4 % (ref 36.5–49.3)
HGB BLD-MCNC: 11 G/DL (ref 12–17)
MCH RBC QN AUTO: 37.9 PG (ref 26.8–34.3)
MCHC RBC AUTO-ENTMCNC: 32 G/DL (ref 31.4–37.4)
MCV RBC AUTO: 119 FL (ref 82–98)
PLATELET # BLD AUTO: 527 THOUSANDS/UL (ref 149–390)
PMV BLD AUTO: 9.7 FL (ref 8.9–12.7)
RBC # BLD AUTO: 2.9 MILLION/UL (ref 3.88–5.62)
WBC # BLD AUTO: 13.49 THOUSAND/UL (ref 4.31–10.16)

## 2017-12-18 PROCEDURE — 36415 COLL VENOUS BLD VENIPUNCTURE: CPT

## 2017-12-18 PROCEDURE — 85027 COMPLETE CBC AUTOMATED: CPT

## 2017-12-19 ENCOUNTER — HOSPITAL ENCOUNTER (OUTPATIENT)
Dept: RADIOLOGY | Facility: HOSPITAL | Age: 62
Discharge: HOME | End: 2017-12-19
Attending: INTERNAL MEDICINE | Admitting: INTERNAL MEDICINE
Payer: COMMERCIAL

## 2017-12-19 VITALS
OXYGEN SATURATION: 99 % | WEIGHT: 190 LBS | SYSTOLIC BLOOD PRESSURE: 155 MMHG | HEIGHT: 69 IN | TEMPERATURE: 98.2 F | RESPIRATION RATE: 7 BRPM | HEART RATE: 61 BPM | DIASTOLIC BLOOD PRESSURE: 80 MMHG | BODY MASS INDEX: 28.14 KG/M2

## 2017-12-19 DIAGNOSIS — D51.1 MEGALOBLASTIC ANEMIA DUE TO VITAMIN B12 MALABSORPTION WITH PROTEINURIA: ICD-10-CM

## 2017-12-19 PROCEDURE — 88342 IMHCHEM/IMCYTCHM 1ST ANTB: CPT | Performed by: INTERNAL MEDICINE

## 2017-12-19 PROCEDURE — 99152 MOD SED SAME PHYS/QHP 5/>YRS: CPT

## 2017-12-19 PROCEDURE — 77012 CT SCAN FOR NEEDLE BIOPSY: CPT

## 2017-12-19 PROCEDURE — 99153 MOD SED SAME PHYS/QHP EA: CPT

## 2017-12-19 PROCEDURE — 88305 TISSUE EXAM BY PATHOLOGIST: CPT | Performed by: INTERNAL MEDICINE

## 2017-12-19 PROCEDURE — 88184 FLOWCYTOMETRY/ TC 1 MARKER: CPT | Performed by: INTERNAL MEDICINE

## 2017-12-19 PROCEDURE — 88341 IMHCHEM/IMCYTCHM EA ADD ANTB: CPT | Performed by: INTERNAL MEDICINE

## 2017-12-19 PROCEDURE — 88185 FLOWCYTOMETRY/TC ADD-ON: CPT

## 2017-12-19 PROCEDURE — 85097 BONE MARROW INTERPRETATION: CPT | Performed by: INTERNAL MEDICINE

## 2017-12-19 PROCEDURE — 38221 DX BONE MARROW BIOPSIES: CPT

## 2017-12-19 PROCEDURE — 88311 DECALCIFY TISSUE: CPT | Performed by: INTERNAL MEDICINE

## 2017-12-19 PROCEDURE — 38220 DX BONE MARROW ASPIRATIONS: CPT

## 2017-12-19 PROCEDURE — 81342 TRG GENE REARRANGEMENT ANAL: CPT | Performed by: INTERNAL MEDICINE

## 2017-12-19 PROCEDURE — 88313 SPECIAL STAINS GROUP 2: CPT | Performed by: INTERNAL MEDICINE

## 2017-12-19 RX ORDER — FENTANYL CITRATE 50 UG/ML
INJECTION, SOLUTION INTRAMUSCULAR; INTRAVENOUS CODE/TRAUMA/SEDATION MEDICATION
Status: COMPLETED | OUTPATIENT
Start: 2017-12-19 | End: 2017-12-19

## 2017-12-19 RX ORDER — MIDAZOLAM HYDROCHLORIDE 1 MG/ML
INJECTION INTRAMUSCULAR; INTRAVENOUS CODE/TRAUMA/SEDATION MEDICATION
Status: COMPLETED | OUTPATIENT
Start: 2017-12-19 | End: 2017-12-19

## 2017-12-19 RX ORDER — SODIUM CHLORIDE 9 MG/ML
75 INJECTION, SOLUTION INTRAVENOUS CONTINUOUS
Status: DISCONTINUED | OUTPATIENT
Start: 2017-12-19 | End: 2017-12-19 | Stop reason: HOSPADM

## 2017-12-19 RX ADMIN — SODIUM CHLORIDE 75 ML/HR: 0.9 INJECTION, SOLUTION INTRAVENOUS at 08:20

## 2017-12-19 RX ADMIN — MIDAZOLAM 1 MG: 1 INJECTION INTRAMUSCULAR; INTRAVENOUS at 10:16

## 2017-12-19 RX ADMIN — FENTANYL CITRATE 50 MCG: 50 INJECTION, SOLUTION INTRAMUSCULAR; INTRAVENOUS at 10:16

## 2017-12-19 RX ADMIN — FENTANYL CITRATE 50 MCG: 50 INJECTION, SOLUTION INTRAMUSCULAR; INTRAVENOUS at 10:33

## 2017-12-19 RX ADMIN — FENTANYL CITRATE 50 MCG: 50 INJECTION, SOLUTION INTRAMUSCULAR; INTRAVENOUS at 10:12

## 2017-12-19 RX ADMIN — MIDAZOLAM 1 MG: 1 INJECTION INTRAMUSCULAR; INTRAVENOUS at 10:34

## 2017-12-19 RX ADMIN — MIDAZOLAM 1 MG: 1 INJECTION INTRAMUSCULAR; INTRAVENOUS at 10:11

## 2017-12-19 NOTE — BRIEF OP NOTE (RAD/CATH)
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

## 2017-12-19 NOTE — DISCHARGE INSTRUCTIONS
POST BIOPSY    Care after your procedure:    1  Limit your activities for 24 hours after your biopsy  2  No driving day of biopsy  3  Return to your normal diet  Small sips of flat soda will help with mild nausea  4  Remove band-aid or dressing 24 hours after procedure  Contact Interventional Radiology at [phone] [place] PATIENTS: Contact Interventional Radiology at [phone]) Zion Wise PATIENTS: Contact Interventional Radiology at [phone]) if:    1  Difficulty breathing, nausea or vomiting  2  Chills or fever above 101 degrees F      3  Pain at biopsy site not relieved by medication  4  Develop any redness, swelling, heat, unusual drainage, heavy bruising or bleeding from biopsy site

## 2017-12-21 ENCOUNTER — APPOINTMENT (EMERGENCY)
Dept: CT IMAGING | Facility: HOSPITAL | Age: 62
DRG: 418 | End: 2017-12-21
Payer: COMMERCIAL

## 2017-12-21 ENCOUNTER — HOSPITAL ENCOUNTER (INPATIENT)
Facility: HOSPITAL | Age: 62
LOS: 2 days | Discharge: HOME | DRG: 418 | End: 2017-12-23
Attending: EMERGENCY MEDICINE | Admitting: INTERNAL MEDICINE
Payer: COMMERCIAL

## 2017-12-21 ENCOUNTER — APPOINTMENT (EMERGENCY)
Dept: ULTRASOUND IMAGING | Facility: HOSPITAL | Age: 62
DRG: 418 | End: 2017-12-21
Payer: COMMERCIAL

## 2017-12-21 ENCOUNTER — GENERIC CONVERSION - ENCOUNTER (OUTPATIENT)
Dept: OTHER | Facility: OTHER | Age: 62
End: 2017-12-21

## 2017-12-21 DIAGNOSIS — D58.9 HEMOLYTIC ANEMIA (HCC): ICD-10-CM

## 2017-12-21 DIAGNOSIS — K80.12 CALCULUS OF GALLBLADDER WITH ACUTE ON CHRONIC CHOLECYSTITIS WITHOUT OBSTRUCTION: ICD-10-CM

## 2017-12-21 DIAGNOSIS — K82.8 THICKENING OF WALL OF GALLBLADDER: ICD-10-CM

## 2017-12-21 DIAGNOSIS — D59.11 HEMOLYTIC ANEMIA DUE TO WARM ANTIBODY (HCC): ICD-10-CM

## 2017-12-21 DIAGNOSIS — R10.12 LUQ PAIN: Primary | ICD-10-CM

## 2017-12-21 DIAGNOSIS — K59.00 CONSTIPATION: ICD-10-CM

## 2017-12-21 DIAGNOSIS — K59.00 OBSTIPATION: ICD-10-CM

## 2017-12-21 DIAGNOSIS — K80.50 BILIARY COLIC: ICD-10-CM

## 2017-12-21 DIAGNOSIS — D72.829 LEUKOCYTOSIS: ICD-10-CM

## 2017-12-21 DIAGNOSIS — D59.10 AUTOIMMUNE HEMOLYTIC ANEMIA (HCC): ICD-10-CM

## 2017-12-21 PROBLEM — R19.5 OTHER FECAL ABNORMALITIES: Status: ACTIVE | Noted: 2017-12-21

## 2017-12-21 LAB
ALBUMIN SERPL BCP-MCNC: 3.6 G/DL (ref 3.5–5)
ALP SERPL-CCNC: 58 U/L (ref 46–116)
ALT SERPL W P-5'-P-CCNC: 51 U/L (ref 12–78)
ANION GAP SERPL CALCULATED.3IONS-SCNC: 8 MMOL/L (ref 4–13)
AST SERPL W P-5'-P-CCNC: 32 U/L (ref 5–45)
BASOPHILS # BLD AUTO: 0.01 THOUSANDS/ΜL (ref 0–0.1)
BASOPHILS NFR BLD AUTO: 0 % (ref 0–1)
BILIRUB DIRECT SERPL-MCNC: 0.58 MG/DL (ref 0–0.2)
BILIRUB SERPL-MCNC: 2.85 MG/DL (ref 0.2–1)
BILIRUB UR QL STRIP: NEGATIVE
BUN SERPL-MCNC: 25 MG/DL (ref 5–25)
CALCIUM SERPL-MCNC: 8.4 MG/DL (ref 8.3–10.1)
CHLORIDE SERPL-SCNC: 104 MMOL/L (ref 100–108)
CLARITY UR: CLEAR
CO2 SERPL-SCNC: 30 MMOL/L (ref 21–32)
COLOR UR: YELLOW
CREAT SERPL-MCNC: 0.85 MG/DL (ref 0.6–1.3)
EOSINOPHIL # BLD AUTO: 0.35 THOUSAND/ΜL (ref 0–0.61)
EOSINOPHIL NFR BLD AUTO: 2 % (ref 0–6)
ERYTHROCYTE [DISTWIDTH] IN BLOOD BY AUTOMATED COUNT: 17.6 % (ref 11.6–15.1)
GFR SERPL CREATININE-BSD FRML MDRD: 93 ML/MIN/1.73SQ M
GLUCOSE SERPL-MCNC: 95 MG/DL (ref 65–140)
GLUCOSE UR STRIP-MCNC: NEGATIVE MG/DL
HCT VFR BLD AUTO: 29.2 % (ref 36.5–49.3)
HGB BLD-MCNC: 10.6 G/DL (ref 12–17)
HGB UR QL STRIP.AUTO: NEGATIVE
KETONES UR STRIP-MCNC: NEGATIVE MG/DL
LEUKOCYTE ESTERASE UR QL STRIP: NEGATIVE
LIPASE SERPL-CCNC: 215 U/L (ref 73–393)
LYMPHOCYTES # BLD AUTO: 2.7 THOUSANDS/ΜL (ref 0.6–4.47)
LYMPHOCYTES NFR BLD AUTO: 18 % (ref 14–44)
MCH RBC QN AUTO: 42.6 PG (ref 26.8–34.3)
MCHC RBC AUTO-ENTMCNC: 36.3 G/DL (ref 31.4–37.4)
MCV RBC AUTO: 117 FL (ref 82–98)
MONOCYTES # BLD AUTO: 1.56 THOUSAND/ΜL (ref 0.17–1.22)
MONOCYTES NFR BLD AUTO: 10 % (ref 4–12)
NEUTROPHILS # BLD AUTO: 10.77 THOUSANDS/ΜL (ref 1.85–7.62)
NEUTS SEG NFR BLD AUTO: 70 % (ref 43–75)
NITRITE UR QL STRIP: NEGATIVE
NRBC BLD AUTO-RTO: 0 /100 WBCS
PH UR STRIP.AUTO: 7 [PH] (ref 4.5–8)
PLATELET # BLD AUTO: 399 THOUSANDS/UL (ref 149–390)
PMV BLD AUTO: 10.1 FL (ref 8.9–12.7)
POTASSIUM SERPL-SCNC: 3.2 MMOL/L (ref 3.5–5.3)
PROT SERPL-MCNC: 5.8 G/DL (ref 6.4–8.2)
PROT UR STRIP-MCNC: NEGATIVE MG/DL
RBC # BLD AUTO: 2.49 MILLION/UL (ref 3.88–5.62)
SODIUM SERPL-SCNC: 142 MMOL/L (ref 136–145)
SP GR UR STRIP.AUTO: 1.01 (ref 1–1.03)
UROBILINOGEN UR QL STRIP.AUTO: 1 E.U./DL
WBC # BLD AUTO: 15.39 THOUSAND/UL (ref 4.31–10.16)

## 2017-12-21 PROCEDURE — 80053 COMPREHEN METABOLIC PANEL: CPT | Performed by: EMERGENCY MEDICINE

## 2017-12-21 PROCEDURE — 99285 EMERGENCY DEPT VISIT HI MDM: CPT

## 2017-12-21 PROCEDURE — 96374 THER/PROPH/DIAG INJ IV PUSH: CPT

## 2017-12-21 PROCEDURE — C9113 INJ PANTOPRAZOLE SODIUM, VIA: HCPCS | Performed by: PHYSICIAN ASSISTANT

## 2017-12-21 PROCEDURE — 81002 URINALYSIS NONAUTO W/O SCOPE: CPT | Performed by: EMERGENCY MEDICINE

## 2017-12-21 PROCEDURE — 74177 CT ABD & PELVIS W/CONTRAST: CPT

## 2017-12-21 PROCEDURE — 82248 BILIRUBIN DIRECT: CPT | Performed by: EMERGENCY MEDICINE

## 2017-12-21 PROCEDURE — 96361 HYDRATE IV INFUSION ADD-ON: CPT

## 2017-12-21 PROCEDURE — 81003 URINALYSIS AUTO W/O SCOPE: CPT

## 2017-12-21 PROCEDURE — 83690 ASSAY OF LIPASE: CPT | Performed by: EMERGENCY MEDICINE

## 2017-12-21 PROCEDURE — 85025 COMPLETE CBC W/AUTO DIFF WBC: CPT | Performed by: EMERGENCY MEDICINE

## 2017-12-21 PROCEDURE — 76705 ECHO EXAM OF ABDOMEN: CPT

## 2017-12-21 PROCEDURE — 36415 COLL VENOUS BLD VENIPUNCTURE: CPT | Performed by: EMERGENCY MEDICINE

## 2017-12-21 RX ORDER — POTASSIUM CHLORIDE 20 MEQ/1
40 TABLET, EXTENDED RELEASE ORAL ONCE
Status: COMPLETED | OUTPATIENT
Start: 2017-12-21 | End: 2017-12-21

## 2017-12-21 RX ORDER — ACETAMINOPHEN 325 MG/1
650 TABLET ORAL EVERY 6 HOURS PRN
Status: DISCONTINUED | OUTPATIENT
Start: 2017-12-21 | End: 2017-12-23 | Stop reason: HOSPADM

## 2017-12-21 RX ORDER — MORPHINE SULFATE 2 MG/ML
2 INJECTION, SOLUTION INTRAMUSCULAR; INTRAVENOUS EVERY 4 HOURS PRN
Status: DISCONTINUED | OUTPATIENT
Start: 2017-12-21 | End: 2017-12-23 | Stop reason: HOSPADM

## 2017-12-21 RX ORDER — MORPHINE SULFATE 4 MG/ML
4 INJECTION, SOLUTION INTRAMUSCULAR; INTRAVENOUS ONCE
Status: COMPLETED | OUTPATIENT
Start: 2017-12-21 | End: 2017-12-21

## 2017-12-21 RX ORDER — PANTOPRAZOLE SODIUM 40 MG/1
40 INJECTION, POWDER, FOR SOLUTION INTRAVENOUS EVERY 12 HOURS SCHEDULED
Status: DISCONTINUED | OUTPATIENT
Start: 2017-12-21 | End: 2017-12-23 | Stop reason: HOSPADM

## 2017-12-21 RX ORDER — POLYETHYLENE GLYCOL 3350 17 G/17G
17 POWDER, FOR SOLUTION ORAL DAILY
Status: DISCONTINUED | OUTPATIENT
Start: 2017-12-22 | End: 2017-12-23 | Stop reason: HOSPADM

## 2017-12-21 RX ORDER — SODIUM CHLORIDE AND POTASSIUM CHLORIDE .9; .15 G/100ML; G/100ML
50 SOLUTION INTRAVENOUS CONTINUOUS
Status: DISCONTINUED | OUTPATIENT
Start: 2017-12-21 | End: 2017-12-23 | Stop reason: HOSPADM

## 2017-12-21 RX ORDER — HYDRALAZINE HYDROCHLORIDE 20 MG/ML
10 INJECTION INTRAMUSCULAR; INTRAVENOUS EVERY 6 HOURS PRN
Status: DISCONTINUED | OUTPATIENT
Start: 2017-12-21 | End: 2017-12-23 | Stop reason: HOSPADM

## 2017-12-21 RX ORDER — ONDANSETRON 2 MG/ML
4 INJECTION INTRAMUSCULAR; INTRAVENOUS EVERY 6 HOURS PRN
Status: DISCONTINUED | OUTPATIENT
Start: 2017-12-21 | End: 2017-12-23 | Stop reason: HOSPADM

## 2017-12-21 RX ADMIN — PANTOPRAZOLE SODIUM 40 MG: 40 INJECTION, POWDER, FOR SOLUTION INTRAVENOUS at 20:20

## 2017-12-21 RX ADMIN — CEFAZOLIN SODIUM 1000 MG: 1 SOLUTION INTRAVENOUS at 20:20

## 2017-12-21 RX ADMIN — PREDNISONE 30 MG: 20 TABLET ORAL at 11:54

## 2017-12-21 RX ADMIN — SODIUM CHLORIDE 1000 ML: 0.9 INJECTION, SOLUTION INTRAVENOUS at 05:26

## 2017-12-21 RX ADMIN — POTASSIUM CHLORIDE 40 MEQ: 1500 TABLET, EXTENDED RELEASE ORAL at 06:32

## 2017-12-21 RX ADMIN — IOHEXOL 100 ML: 350 INJECTION, SOLUTION INTRAVENOUS at 06:15

## 2017-12-21 RX ADMIN — SODIUM CHLORIDE AND POTASSIUM CHLORIDE 100 ML/HR: .9; .15 SOLUTION INTRAVENOUS at 13:10

## 2017-12-21 RX ADMIN — CEFAZOLIN SODIUM 1000 MG: 1 SOLUTION INTRAVENOUS at 13:10

## 2017-12-21 RX ADMIN — MORPHINE SULFATE 4 MG: 4 INJECTION, SOLUTION INTRAMUSCULAR; INTRAVENOUS at 05:25

## 2017-12-21 RX ADMIN — PANTOPRAZOLE SODIUM 40 MG: 40 INJECTION, POWDER, FOR SOLUTION INTRAVENOUS at 11:57

## 2017-12-21 RX ADMIN — ACETAMINOPHEN 650 MG: 325 TABLET, FILM COATED ORAL at 17:37

## 2017-12-21 NOTE — H&P
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, he also has a history of a DVT and portal vein thrombosis on Xarelto  The patient presents today with complaints of abdominal pain and fullness that started approximately 2 days ago which he describes as a band across his upper abdomen  He has been having normal bowel movements without any blood  In the ER the patient's vital signs were normal his blood pressure did peak at 189/87 in the ER  Labs were notable for potassium of 3 2, total bilirubin 2 85, direct bilirubin 0 58, white blood cell count 15 3 hemoglobin 10 6, platelets 388  The patient proceeded to get a CT the abdomen and pelvis with contrast and did show a distended gallbladder with wall thickening and several calcified stones, correlate for acute cholecystitis verses thickening related to systemic etiology  Visible portal venous thrombosis significantly improved compared to June  On my personal review the CT also shows there is obstipation without obstruction throughout the colon  The patient proceeded to get a ultrasound of the right upper quadrant which showed depending gallbladder sludge and stones with gallbladder wall thickening up to 11 mm      Hospital Problem List:     Principal Problem:    Biliary colic  Active Problems:    Tobacco abuse    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)    Hyperbilirubinemia    Portal vein thrombosis    GERD (gastroesophageal reflux disease)    Obstipation      Plan for the Primary Problem(s):  1  Biliary colic-appreciate the prompt consult by surgery, Elian Yang physician assistant and Dr Salma Salas    The plan at the time of this dictation is to make the patient NPO for possible laparoscopic cholecystectomy versus percutaneous cholecystectomy  The patient is tender but no acute abdomen  Monitor temperature white blood cell count LFTs and bilirubin closely  Will provide the patient with IVF in the form of normal saline and 20 mEq potassium chloride running at 100 mL an hour  Plan for Additional Problems:   2  Obstipation-the patient is having normal bowel movements without any blood  However the CT scan does show quite a bit of retained fecal material   Appreciate Gastroenterology input on bowel management in light of 1  Will change the PPI over to IV b i d   3   Hemolytic anemia status post splenectomy and recent bone marrow biopsy-the patient is under the care of Dr Kristie Shields  He is currently on 30 mg of prednisone which we will continue  His hemoglobin is 10 6 this is approximately his new baseline  His last transfusion was on Friday  4  History of a DVT and portal vein thrombosis-the patient is on Xarelto his portal vein thrombosis is improving  We will hold the Xarelto in anticipation of potential surgical intervention  5  Tobacco abuse-the patient still smokes an occasional his cigar  He was counseled  A patch will be provided  6  Hypertensive urgency-the patient's blood pressure peaked at 189/87  The patient does not have a diagnosis of essential hypertension he is not on any chronic antihypertensive meds  This is likely secondary to pain and possibly chronic steroids  Will provide hydralazine p r n     The patient does have a slight headache without any vision changes    Will provide APAP    This case and plan was discussed in detail with Dr Naa Soliman Internal Medicine attending as well as Dr Evette Rebollar general surgery and they agree with the plan      VTE Prophylaxis: secd   Code Status: full code    Anticipated Length of Stay:  Patient will be admitted on an Inpatient basis with an anticipated length of stay of  Greater than 2 midnights  Total Time for Visit, including Counseling / Coordination of Care: 45 minutes  Greater than 50% of this total time spent on direct patient counseling and coordination of care  Chief Complaint:     Abdominal pain    History of Present Illness:    Felipa Slaughter is a 60 y o  male who presents with abdominal pain will worse across the upper abdomen  He has been having more distension which she noticed approximately 2 days ago  He has been having normal bowel movements without any blood or watery diarrhea or constipation  He denies any nausea or vomiting  He denies any fevers or chills  He is chronically short of breath the he says it is difficult taking a deep breath secondary to the abdominal fullness  He is also having difficulty bending over  He denies any substernal chest pain or palpitations  He had has a slight headache in the ER status post morphine  He denies any dysuria hematuria or urinary retention    Review of Systems:  A 12-point review of systems was done  Please see the HPI for the full details  All other systems negative  Past Medical and Surgical History:     Past Medical History:   Diagnosis Date    Autoimmune hemolytic anemia (HCC)     Hemolytic anemia (HCC)     Palpitation     Tobacco abuse        Past Surgical History:   Procedure Laterality Date    KNEE SURGERY Right     NV REMOVAL SPLEEN, TOTAL N/A 5/18/2017    Procedure: LAPAROSCOPIC HAND ASSIST SPLENECTOMY;  Surgeon: Pradeep Diggs MD;  Location: [facility];  Service: Surgical Oncology    SHOULDER SURGERY Left        Meds/Allergies:    No current facility-administered medications for this encounter        Current Outpatient Prescriptions   Medication Sig Dispense Refill    aspirin 81 MG tablet Take 81 mg by mouth daily      cyanocobalamin (VITAMIN B-12) 1,000 mcg tablet Take 1,000 mcg by mouth daily      ergocalciferol (VITAMIN D2) 50,000 units Take 50,000 Units by mouth once a week        Ferrous Sulfate (IRON) 325 (65 FE) MG TABS Take 325 mg by mouth daily        influenza inactivated quadrivalent vaccine (FLULAVAL) 0 5 ML ANALILIA Inject 0 5 mL into the shoulder, thigh, or buttocks prior to discharge (preventative) for up to 1 dose 1 Syringe 0    Multiple Vitamins-Minerals (ONE DAILY MENS) TABS Take by mouth      pantoprazole (PROTONIX) 40 mg tablet Take 1 tablet by mouth daily in the early morning 30 tablet 0    predniSONE 20 mg tablet Take 1 tablet by mouth daily 60 tablet 0    rivaroxaban (XARELTO) 15 mg tablet Take 1 tablet by mouth 2 (two) times a day with meals for 21 days 42 tablet 0    rivaroxaban (XARELTO) 20 mg tablet Take 1 tablet by mouth daily with breakfast 30 tablet 0     Allergies   Allergen Reactions    Iodinated Diagnostic Agents Hives     History:     Marital Status: /Civil Union     Substance Use History:   History   Alcohol Use    3 6 oz/week    6 Cans of beer per week     Comment: social     History   Smoking Status    Light Tobacco Smoker    Types: Cigars   Smokeless Tobacco    Current User     Comment: socially     History   Drug Use No       Family History:    History reviewed  No pertinent family history      Physical Exam:   Vitals:   Blood Pressure: (!) 175/93 (12/21/17 1109)  Pulse: 77 (12/21/17 1109)  Temperature: 98 5 °F (36 9 °C) (12/21/17 1109)  Temp Source: Oral (12/21/17 1109)  Respirations: 19 (12/21/17 1109)  Weight - Scale: 86 2 kg (190 lb) (12/21/17 0442)  SpO2: 98 % (12/21/17 1109)    General Appearance:    Alert, cooperative, no distress, appears stated age   HEENT:    Atraumatic, EOMs intact, Mucous membranes dry without   exudates   Neck:   Supple, symmetrical, trachea midline, no adenopathy;     thyroid:  no enlargement/tenderness/nodules; no carotid    bruit or JVD   Lungs:     Coarse but clear to auscultation bilaterally, respirations unlabored   Chest Wall:    No tenderness or deformity    Heart:    Regular rate and rhythm, S1 and S2 normal, no murmur, rub    or gallop   Abdomen:     Soft, distended, tympanic, tender across RUQ/epigastric/LUQ,  bowel sounds hyperactive all four quadrants, no acute abdomen   Extremities:   Extremities normal, atraumatic, no cyanosis or edema   Pulses:   2+ and symmetric all extremities   Skin:   Pale, slight jaundice, dry poor turgor good cap refill, no rashes or lesions   Lymph nodes:   Cervical, supraclavicular, and axillary nodes normal   Neurologic:   CNII-XII intact, normal strength, sensation and reflexes     throughout       Lab Results: I have personally reviewed pertinent reports  Results from last 7 days  Lab Units 12/21/17  0525   WBC Thousand/uL 15 39*   HEMOGLOBIN g/dL 10 6*   HEMATOCRIT % 29 2*   PLATELETS Thousands/uL 399*   NEUTROS PCT % 70   LYMPHS PCT % 18   MONOS PCT % 10   EOS PCT % 2       Results from last 7 days  Lab Units 12/21/17  0525   SODIUM mmol/L 142   POTASSIUM mmol/L 3 2*   CHLORIDE mmol/L 104   CO2 mmol/L 30   BUN mg/dL 25   CREATININE mg/dL 0 85   CALCIUM mg/dL 8 4   TOTAL PROTEIN g/dL 5 8*   BILIRUBIN TOTAL mg/dL 2 85*   ALK PHOS U/L 58   ALT U/L 51   AST U/L 32   GLUCOSE RANDOM mg/dL 95           Imaging: I have personally reviewed pertinent reports  Ct Bone Marrow Biopsy And Aspiration    Result Date: 12/19/2017  Narrative: IMAGE-GUIDED BONE MARROW BIOPSY Indication: Hemolytic anemia Procedure and Findings: The procedure was performed in the prone position  1% lidocaine was used for local anesthetic and conscious sedation was administered  The right posterior inferior iliac spine was localized by CT and the low back was prepped and draped in sterile fashion  Using CT guidance, 13 gauge TRAP needle was advanced through the cortex of the iliac spine into the marrow  Aspiration was performed and submitted to pathology for slide preparation  Diagnostic marrow cells were identified preliminarily    The needle was slightly withdrawn and redirected to obtain a core biopsy  The core was submitted to pathology  The puncture site was cleansed and a dressing was applied  This examination, like all CT scans performed in the [facility], was performed utilizing techniques to minimize radiation dose exposure, including the use of iterative reconstruction and automated exposure control  Sedation time: 30 minutes     Impression: Impression: Technically successful image guided bone marrow aspiration and core biopsy Workstation performed: [id number]     Us Right Upper Quadrant    Addendum Date: 12/21/2017 Addendum:   Addendum: Please note the left portal vein is difficult to evaluate due to shadowing bowel gas  The middle and right portal veins appear unremarkable with normal hepatopedal flow  Result Date: 12/21/2017  Narrative: RIGHT UPPER QUADRANT ULTRASOUND INDICATION:  Right upper quadrant pain  Prior cholecystitis  COMPARISON:  CT dated 12/21/2017 TECHNIQUE:   Real-time ultrasound of the right upper quadrant was performed with a curvilinear transducer with both volumetric sweeps and still imaging techniques  FINDINGS: PANCREAS:  Visualized portions of the pancreas are within normal limits  AORTA AND IVC:  Visualized portions are normal for patient age  LIVER: Size:  Mildly enlarged  The liver measures 18 7 cm in the midclavicular line  Contour:  Surface contour is smooth  Parenchyma: There is moderate diffuse increased echogenicity with smooth echotexture and acoustic beam attenuation  Most consistent with moderate hepatic steatosis  Fatty sparing noted left hepatic lobe  No evidence of suspicious mass  Limited imaging of the main portal vein shows it to be patent and hepatopetal   BILIARY: The gallbladder is distended  Gallbladder wall thickening noted with edematous appearance measuring up to 11 mm without significant pericholecystic fluid   Dependent stones and sludge identified within the gallbladder No sonographic Salas's sign  No intrahepatic biliary dilatation  CBD measures 6 mm  No choledocholithiasis  KIDNEY: Right kidney measures 11 5 cm  Within normal limits  ASCITES:   None  Impression: 1  Dependent gallbladder sludge and stones with gallbladder wall thickening but no pericholecystic fluid  Sonographic Salas's sign is negative  Again recommend clinical correlation for acute cholecystitis versus gallbladder wall thickening related to  systemic disease, similar to recent CT  No CBD dilatation or choledocholithiasis  2   Fatty liver with left hepatic lobe fatty sparing  Workstation performed: [id number]     Ct Abdomen Pelvis With Contrast    Result Date: 12/21/2017  Narrative: CT ABDOMEN AND PELVIS WITH IV CONTRAST INDICATION:  RIGHT upper quadrant pain  History of hemolytic anemia  Jaundice  COMPARISON: June 6, 2017 and 11/6/2017 TECHNIQUE:  CT examination of the abdomen and pelvis was performed  Reformatted images were created in axial, sagittal, and coronal planes  Radiation dose length product (DLP) for this visit:  654 mGy-cm   This examination, like all CT scans performed in the [facility], was performed utilizing techniques to minimize radiation dose exposure, including the use of iterative reconstruction and automated exposure control  IV Contrast:  100 mL of iohexol (OMNIPAQUE)         Enteric Contrast:  Enteric contrast was not administered  FINDINGS: ABDOMEN LOWER CHEST:  No significant abnormalities identified in the lower chest  LIVER/BILIARY TREE:  There is a geographic territorial region of asymmetric perfusion of the liver  Specifically, portions of the LEFT medial and lateral segments are relatively hyperdense compared to the remainder of the liver  Although considerably less apparent on this exam, this appears to be secondary to thrombosis within the LEFT portal vein  On comparison to June, the prior portal venous thrombus is significantly reduced    No filling defects remaining within the main portal vein or RIGHT portal vein branches  GALLBLADDER:  Gallbladder is moderately distended and contains a few calcified calculi  Minimal haziness at the margins of the gallbladder and mild asymmetric wall thickening along the hepatic margin of the gallbladder  Correlate with clinical findings  for evidence of acute cholecystitis versus secondary changes of the gallbladder related to systemic etiology  SPLEEN:  Prior splenectomy  PANCREAS:  Unremarkable  ADRENAL GLANDS:  Unremarkable  KIDNEYS/URETERS:  Unremarkable  No hydronephrosis  STOMACH AND BOWEL:  There is colonic diverticulosis without evidence of acute diverticulitis  APPENDIX:  A normal appendix was visualized  ABDOMINOPELVIC CAVITY:  No ascites or free intraperitoneal air  No lymphadenopathy  VESSELS:  Unremarkable for patient's age  PELVIS REPRODUCTIVE ORGANS:  Unremarkable for patient's age  URINARY BLADDER:  Unremarkable  ABDOMINAL WALL/INGUINAL REGIONS:  Unremarkable  OSSEOUS STRUCTURES:  No acute fracture or destructive osseous lesion  Impression: Gallbladder is distended and shows findings suggesting mild wall thickening or adjacent fluid  Several calcified stones present  No significant pericholecystic inflammation  Correlate with clinical findings for acute cholecystitis versus wall thickening related to systemic etiology Asymmetric radiographic alteration in the liver perfusion pattern as detailed above, most consistent with alteration in flow related to segmental portal vein thrombus  Overall visible portal venous thrombus significantly improved compared to June  Diverticulosis without evidence for diverticulitis  Prior splenectomy  Workstation performed: [id number]       [employer] Records Reviewed: Yes     This note has been constructed using a voice recognition system

## 2017-12-21 NOTE — ED PROVIDER NOTES
History  Chief Complaint   Patient presents with    Abdominal Pain     patient reports upper abdominal pan and "fullness" that started 2 days ago, worse on left side  reports nausea  LBM this morning, normal per patient  [de-identified] y/o M comes in for evaluation of increased distention and pain in abdomen  History of portal vein thrombosis on Xarelto (prior DVT); also post splenectomy for autoimmune hemolytic anemia  Chronic prednisone use; recently switched to Decadron due to bone marrow biopsy that occurred on Tuesday  Had increased distention going on since Tuesday but now is distention +pain  Not worse with meals; no change in bowel movements; most recently was this morning  Denies fevers chills; has nausea without emesis; denies chest pain shortness of breath; states my stomach is pushing up on my lungs and therefore I can't take a deep breath; because of my stomach  Denies lower extremity swelling; denies falls or traumas or accidents; no external signs of trauma; no blood in stool  Vital signs unremarkable  Abdomen distended  Very tympanic to percussion; also tender in left upper epigastric and right upper quadrants  Systemically appears well and nontoxic  Abdominal pain Labs fluid; pain control; CT scan  Decreased p o  intake times 24 hours due to lack of appetite  Prior to Admission Medications   Prescriptions Last Dose Informant Patient Reported? Taking?    Ferrous Sulfate (IRON) 325 (65 FE) MG TABS 12/20/2017 at Unknown time  Yes Yes   Sig: Take 325 mg by mouth daily     Multiple Vitamins-Minerals (ONE DAILY MENS) TABS 12/20/2017 at Unknown time  Yes Yes   Sig: Take by mouth   aspirin 81 MG tablet 12/20/2017 at Unknown time  Yes Yes   Sig: Take 81 mg by mouth daily   cyanocobalamin (VITAMIN B-12) 1,000 mcg tablet 12/20/2017 at Unknown time  Yes Yes   Sig: Take 1,000 mcg by mouth daily   ergocalciferol (VITAMIN D2) 50,000 units 12/20/2017 at Unknown time  Yes Yes   Sig: Take 50,000 Units by mouth once a week     influenza inactivated quadrivalent vaccine (FLULAVAL) 0 5 ML ANALILIA Unknown at Unknown time  No No   Sig: Inject 0 5 mL into the shoulder, thigh, or buttocks prior to discharge (preventative) for up to 1 dose   pantoprazole (PROTONIX) 40 mg tablet 12/20/2017 at Unknown time  No Yes   Sig: Take 1 tablet by mouth daily in the early morning   predniSONE 20 mg tablet 12/20/2017 at Unknown time  No Yes   Sig: Take 1 tablet by mouth daily   rivaroxaban (XARELTO) 15 mg tablet   No No   Sig: Take 1 tablet by mouth 2 (two) times a day with meals for 21 days   rivaroxaban (XARELTO) 20 mg tablet 12/20/2017 at Unknown time  No Yes   Sig: Take 1 tablet by mouth daily with breakfast      Facility-Administered Medications: None       Past Medical History:   Diagnosis Date    Autoimmune hemolytic anemia (HCC)     Hemolytic anemia (HCC)     Palpitation     Tobacco abuse        Past Surgical History:   Procedure Laterality Date    KNEE SURGERY Right     GA REMOVAL SPLEEN, TOTAL N/A 5/18/2017    Procedure: LAPAROSCOPIC HAND ASSIST SPLENECTOMY;  Surgeon: Brock Mitchell MD;  Location: [facility];  Service: Surgical Oncology    SHOULDER SURGERY Left        History reviewed  No pertinent family history  I have reviewed and agree with the history as documented  Social History   Substance Use Topics    Smoking status: Light Tobacco Smoker     Types: Cigars    Smokeless tobacco: Current User      Comment: socially    Alcohol use 3 6 oz/week     6 Cans of beer per week      Comment: social        Review of Systems   Constitutional: Positive for appetite change  Negative for activity change, chills and fever  HENT: Negative for congestion, rhinorrhea and sore throat  Eyes: Negative for photophobia, pain and visual disturbance  Respiratory: Negative for cough, chest tightness, shortness of breath and wheezing  Cardiovascular: Negative for chest pain, palpitations and leg swelling  Gastrointestinal: Positive for abdominal distention, abdominal pain and nausea  Negative for constipation and diarrhea  Genitourinary: Negative for difficulty urinating, dysuria, flank pain, frequency and hematuria  Musculoskeletal: Negative for arthralgias, back pain, myalgias, neck pain and neck stiffness  Skin: Negative for color change, pallor, rash and wound  Neurological: Negative for dizziness, seizures, syncope, speech difficulty, weakness, light-headedness and headaches  Hematological: Negative for adenopathy  Psychiatric/Behavioral: Negative for agitation, confusion, hallucinations and suicidal ideas  Physical Exam  ED Triage Vitals   Temperature Pulse Respirations Blood Pressure SpO2   12/21/17 0444 12/21/17 0442 12/21/17 0442 12/21/17 0442 12/21/17 0442   97 9 °F (36 6 °C) 64 15 (!) 172/84 99 %      Temp Source Heart Rate Source Patient Position - Orthostatic VS BP Location FiO2 (%)   12/21/17 0444 12/21/17 0655 12/21/17 0442 12/21/17 0442 --   Oral Monitor Sitting Right arm       Pain Score       12/21/17 0655       5           Orthostatic Vital Signs  Vitals:    12/21/17 1131 12/21/17 1556 12/21/17 2250 12/22/17 0717   BP: 158/88 146/67 133/78 136/70   Pulse: 78 70 58 63   Patient Position - Orthostatic VS: Sitting  Lying Lying       Physical Exam   Constitutional: He is oriented to person, place, and time  He appears well-developed and well-nourished  No distress  HENT:   Head: Normocephalic and atraumatic  Right Ear: External ear normal    Left Ear: External ear normal    Mouth/Throat: Oropharynx is clear and moist  No oropharyngeal exudate  Eyes: Conjunctivae and EOM are normal  Pupils are equal, round, and reactive to light  Right eye exhibits no discharge  Left eye exhibits no discharge  No scleral icterus  Neck: Normal range of motion  Neck supple  No JVD present  No tracheal deviation present     Cardiovascular: Normal rate, normal heart sounds and intact distal pulses  No murmur heard  Pulmonary/Chest: Effort normal and breath sounds normal  No respiratory distress  He has no wheezes  He has no rales  Abdominal: Soft  Bowel sounds are normal  He exhibits distension  There is tenderness  There is no rebound and no guarding  Increased tympanitic sounds with percussion  Increased distention  Tenderness upper quadrant  No rebound guarding no peritoneal signs  Musculoskeletal: Normal range of motion  He exhibits no edema or deformity  Lymphadenopathy:     He has no cervical adenopathy  Neurological: He is alert and oriented to person, place, and time  No cranial nerve deficit  He exhibits normal muscle tone  Skin: Skin is warm and dry  Capillary refill takes less than 2 seconds  He is not diaphoretic  No erythema  No pallor  Psychiatric: He has a normal mood and affect   His behavior is normal  Judgment and thought content normal        ED Medications  Medications   predniSONE tablet 30 mg (30 mg Oral Given 12/22/17 0831)   nicotine (NICODERM CQ) 7 mg/24hr TD 24 hr patch 1 patch (1 patch Transdermal Not Given 12/22/17 0832)   ondansetron (ZOFRAN) injection 4 mg (not administered)   acetaminophen (TYLENOL) tablet 650 mg (650 mg Oral Given 12/21/17 1737)   morphine injection 2 mg (not administered)   pantoprazole (PROTONIX) injection 40 mg (40 mg Intravenous Given 12/22/17 0828)   hydrALAZINE (APRESOLINE) injection 10 mg (not administered)   sodium chloride 0 9 % with KCl 20 mEq/L infusion (premix) (100 mL/hr Intravenous New Bag 12/22/17 0047)   ceFAZolin (ANCEF) IVPB (premix) 1,000 mg (1,000 mg Intravenous New Bag 12/22/17 0435)   polyethylene glycol (MIRALAX) packet 17 g (17 g Oral Given 12/22/17 0828)   sodium chloride 0 9 % bolus 1,000 mL (0 mL Intravenous Stopped 12/21/17 0619)   morphine (PF) 4 mg/mL injection 4 mg (4 mg Intravenous Given 12/21/17 0525)   iohexol (OMNIPAQUE) 350 MG/ML injection (SINGLE-DOSE) 100 mL (100 mL Intravenous Given 12/21/17 3962)   potassium chloride (K-DUR,KLOR-CON) CR tablet 40 mEq (40 mEq Oral Given 12/21/17 6202)       Diagnostic Studies  Results Reviewed     Procedure Component Value Units Date/Time    Bilirubin, direct [[id number]]  (Abnormal) Collected:  12/21/17 0525    Lab Status:  Final result Specimen:  Blood from Arm, Right Updated:  12/21/17 0758     Bilirubin, Direct 0 58 (H) mg/dL     POCT urinalysis dipstick [[id number]]  (Abnormal) Resulted:  12/21/17 0703    Lab Status:  Final result Specimen:  Urine Updated:  12/21/17 0703    ED Urine Macroscopic [[id number]]  (Normal) Collected:  12/21/17 0720    Lab Status:  Final result Specimen:  Urine Updated:  12/21/17 0703     Color, UA Yellow     Clarity, UA Clear     pH, UA 7 0     Leukocytes, UA Negative     Nitrite, UA Negative     Protein, UA Negative mg/dl      Glucose, UA Negative mg/dl      Ketones, UA Negative mg/dl      Urobilinogen, UA 1 0 E U /dl      Bilirubin, UA Negative     Blood, UA Negative     Specific Gravity, UA 1 015    Narrative:       CLINITEK RESULT    Comprehensive metabolic panel [[id number]]  (Abnormal) Collected:  12/21/17 0525    Lab Status:  Final result Specimen:  Blood from Arm, Right Updated:  12/21/17 0600     Sodium 142 mmol/L      Potassium 3 2 (L) mmol/L      Chloride 104 mmol/L      CO2 30 mmol/L      Anion Gap 8 mmol/L      BUN 25 mg/dL      Creatinine 0 85 mg/dL      Glucose 95 mg/dL      Calcium 8 4 mg/dL      AST 32 U/L      ALT 51 U/L      Alkaline Phosphatase 58 U/L      Total Protein 5 8 (L) g/dL      Albumin 3 6 g/dL      Total Bilirubin 2 85 (H) mg/dL      eGFR 93 ml/min/1 73sq m     Narrative:         National Kidney Disease Education Program recommendations are as follows:  GFR calculation is accurate only with a steady state creatinine  Chronic Kidney disease less than 60 ml/min/1 73 sq  meters  Kidney failure less than 15 ml/min/1 73 sq  meters      Lipase [[id number]]  (Normal) Collected:  12/21/17 0525    Lab Status:  Final result Specimen:  Blood from Arm, Right Updated:  12/21/17 0600     Lipase 215 u/L     CBC and differential [[id number]]  (Abnormal) Collected:  12/21/17 0525    Lab Status:  Final result Specimen:  Blood from Arm, Right Updated:  12/21/17 0541     WBC 15 39 (H) Thousand/uL      RBC 2 49 (L) Million/uL      Hemoglobin 10 6 (L) g/dL      Hematocrit 29 2 (L) %       (H) fL      MCH 42 6 (H) pg      MCHC 36 3 g/dL      RDW 17 6 (H) %      MPV 10 1 fL      Platelets 814 (H) Thousands/uL      nRBC 0 /100 WBCs      Neutrophils Relative 70 %      Lymphocytes Relative 18 %      Monocytes Relative 10 %      Eosinophils Relative 2 %      Basophils Relative 0 %      Neutrophils Absolute 10 77 (H) Thousands/µL      Lymphocytes Absolute 2 70 Thousands/µL      Monocytes Absolute 1 56 (H) Thousand/µL      Eosinophils Absolute 0 35 Thousand/µL      Basophils Absolute 0 01 Thousands/µL                  US right upper quadrant   ED Interpretation by Lang Mortimer, MD (12/21 0915)   Abnormal   Awaiting formal read  +stone   +pericholecystic fluid   +mild wall thickening      Looks like cholecystitis, awaiting formal read then discuss with surgery  Final Result by Emory Headley MD (25/26 5591)   Addendum 1 of 1 by Emory Headley MD (12/21 0250)   Addendum: Please note the left portal vein is difficult to evaluate due to    shadowing bowel gas  The middle and right portal veins appear    unremarkable with normal hepatopedal flow  Final   1  Dependent gallbladder sludge and stones with gallbladder wall thickening but no pericholecystic fluid  Sonographic Salas's sign is negative  Again recommend clinical correlation for acute cholecystitis versus gallbladder wall thickening related to    systemic disease, similar to recent CT  No CBD dilatation or choledocholithiasis  2   Fatty liver with left hepatic lobe fatty sparing           Workstation performed: [id number]         CT abdomen pelvis with contrast   ED Interpretation by Nany Gilbert MD (60/93 6337)   CT ordered by previous physician and the report from radiologist has been reviewed  Final Result by Anurag Nash MD (81/76 2970)      Gallbladder is distended and shows findings suggesting mild wall thickening or adjacent fluid  Several calcified stones present  No significant pericholecystic inflammation  Correlate with clinical findings for acute cholecystitis versus wall    thickening related to systemic etiology      Asymmetric radiographic alteration in the liver perfusion pattern as detailed above, most consistent with alteration in flow related to segmental portal vein thrombus  Overall visible portal venous thrombus significantly improved compared to June  Diverticulosis without evidence for diverticulitis  Prior splenectomy  Workstation performed: [id number]         MRI abdomen w wo contrast and mrcp    (Results Pending)         Procedures  Procedures      Phone Consults  ED Phone Contact    ED Course  ED Course as of Dec 22 1202   Thu Dec 21, 2017   7232 No new megaloblastic anemia MCV: (!) 117   0549 Improved from prior baseline Hemoglobin: (!) 10 6   0624 Potassium: (!) 3 2   0655 Gallbladder is distended and shows findings suggesting mild wall thickening or adjacent fluid   Several calcified stones present   No significant pericholecystic inflammation   Correlate with clinical findings for acute cholecystitis versus wall   thickening related to systemic etiology    Asymmetric radiographic alteration in the liver perfusion pattern as detailed above, most consistent with alteration in flow related to segmental portal vein thrombus   Overall visible portal venous thrombus significantly improved compared to June  Gallbladder is distended and shows findings suggesting mild wall thickening or adjacent fluid   Several calcified stones present   No significant pericholecystic inflammation   Correlate with clinical findings for acute cholecystitis versus wall   thickening related to systemic etiology                                MDM  Number of Diagnoses or Management Options  LUQ pain:   Diagnosis management comments: CT returned equivocal for cholecystitis but given discomfort on exam spoke with Gen Surg  Dr Mando Bruce as documented in ED course; recs GB formal U/S, ordered  Had elevated TB but has autoimmune hemolytic anemia  Mild leukocytosis from piror  Patient admitted to hospital for further workup  Given pain control and IVF in the ED, Hemodynamically stable  CritCare Time    Disposition  Final diagnoses:   LUQ pain     Time reflects when diagnosis was documented in both MDM as applicable and the Disposition within this note     Time User Action Codes Description Comment    12/21/2017 10:33 AM Pegge Shires Add [R10 12] LUQ pain     12/21/2017 11:08 AM Christopher Him Add [S20 56] Biliary colic     88/14/3511 47:56 AM [facility] Modify [L70 90] Biliary colic     19/41/9424 08:31 AM SenDanielle parekh Add [K59 00] Constipation     12/21/2017 11:20 AM Christopher Him Add [K59 00] Obstipation     12/21/2017 11:20 AM Christopher Him Modify [K59 00] Obstipation     12/22/2017  8:41 AM Rommel Gu[place]ajara Add [D59 1] 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 ([address] )     12/22/2017  8:41 AM Rommel [place] Remove [D59 1] 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 ([address] )     12/22/2017  8:42 AM Rommel [place] Add [D59 1] 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 ([address] )     12/22/2017  8:47 AM Rommel [place] Add [D59 1] Autoimmune hemolytic anemia [facility])       ED Disposition     ED Disposition Condition Comment    Admit  Case was discussed with Rachele and the patient's admission status was agreed to be Admission Status: inpatient status to the service of Dr Garcia Bachelor           Follow-up Information    None       Current Discharge Medication List      CONTINUE these medications which have NOT CHANGED    Details   aspirin 81 MG tablet Take 81 mg by mouth daily cyanocobalamin (VITAMIN B-12) 1,000 mcg tablet Take 1,000 mcg by mouth daily      ergocalciferol (VITAMIN D2) 50,000 units Take 50,000 Units by mouth once a week        Ferrous Sulfate (IRON) 325 (65 FE) MG TABS Take 325 mg by mouth daily        Multiple Vitamins-Minerals (ONE DAILY MENS) TABS Take by mouth      pantoprazole (PROTONIX) 40 mg tablet Take 1 tablet by mouth daily in the early morning  Qty: 30 tablet, Refills: 0      predniSONE 20 mg tablet Take 1 tablet by mouth daily  Qty: 60 tablet, Refills: 0      rivaroxaban (XARELTO) 20 mg tablet Take 1 tablet by mouth daily with breakfast  Qty: 30 tablet, Refills: 0    Comments: Complete 21 days of 15mg 2x/day, then start 20mg daily      influenza inactivated quadrivalent vaccine (FLULAVAL) 0 5 ML ANALILIA Inject 0 5 mL into the shoulder, thigh, or buttocks prior to discharge (preventative) for up to 1 dose  Qty: 1 Syringe, Refills: 0           No discharge procedures on file  ED Provider  Attending physically available and evaluated Teto Cazares I managed the patient along with the ED Attending      Electronically Signed by         Flakita Jama DO  Resident  12/22/17 5062

## 2017-12-21 NOTE — PLAN OF CARE
INFECTION - ADULT     Absence or prevention of progression during hospitalization Progressing     Absence of fever/infection during neutropenic period Progressing        Knowledge Deficit     Patient/family/caregiver demonstrates understanding of disease process, treatment plan, medications, and discharge instructions Progressing        PAIN - ADULT     Verbalizes/displays adequate comfort level or baseline comfort level Progressing        Potential for Falls     Patient will remain free of falls Progressing        SAFETY ADULT     Maintain or return to baseline ADL function Progressing     Maintain or return mobility status to optimal level Progressing

## 2017-12-21 NOTE — CONSULTS
Consultation - General Surgery   Memo Duarte 60 y o  male MRN: [id number]  Unit/Bed#: [place] 2 -01 Encounter: [id number]    Assessment/Plan     Assessment:  Acute Calculous Cholecystitis  Autoimmune Hemolytic Anemia  Open Splenectomy, 5/2017  Recent Portal Vein thrombosis and LE DVT with small PE; Pt on Xarelto  On prednisone therapy per Oncology    Plan:  Patient admitted on Internal Medicine service, will be kept NPO, give IV hydration, IV pain management  Given patient's Xarelto requirement our options are: Conservative management, IR placement of cholecystostomy tube, or laparoscopic cholecystectomy during this admission  History of Present Illness     HPI:  Memo Duarte is a 60 y o  male who presents with a several hour history of severe upper abdominal pain  The patient denies any associated nausea, vomiting, fever or chills  The patient states that he was in his usual state of good health yesterday and when he went to bed last night  He was awakened with the abrupt onset of this severe upper abdominal pain which she indicates involve the entire upper abdomen  The patient claims that he has had normal bowel movements each day and he continues to pass flatus  Of note, the patient states that when he was getting dressed for work yesterday morning he noticed he was unable to button the waistband of his trousers  According to him, this is a change that essentially occurred overnight  Despite this the patient worked his full work schedule yesterday and had no other indication of anything going on and consumed his normal meals throughout the day  The patient's workup today showed a white blood cell count of 15 39 with 70 segs and his H/H are 10 6/29 2 respectively  Platelet count is 777  The patient's potassium is 3 2 and his total bilirubin is 2 85 with a direct bilirubin of 0 58      A CT scan of the abdomen and pelvis with IV contrast reveals a moderately distended gallbladder with wall thickening and multiple calcified stones  It was additionally noted that there is some asymmetry of the perfusion of the liver which is attributed to a prior portal vein thrombosis  An ultrasound of the gallbladder and liver confirms these findings and shows no evidence of choledocholithiasis with common bile duct diameter being 6 mm  Neither modality revealed any significant baldev-cholecystic fluid  The patient was attended in the emergency department by Dr Baron Guerrero  Inpatient consult to Acute Care Surgery  Consult performed by: Mao Roblero ordered by: STEW MUJICA          Review of Systems   Constitutional:        Robust active 68-year-old male presents to emergency department with complaint of abrupt onset of upper abdominal pain as described in the history of present illness  The patient states that he was in his usual state of relatively good health until the onset of the pain  HENT: Negative  Eyes: Negative  Respiratory:        Patient admits to smoking 2 or 3 cigars a week but otherwise has no significant history  Cardiovascular: The patient denies any history of heart problems or hypertension  However, the does have significant hypertension upon presentation to our emergency department  Internal Medicine is aware and will monitor this during this admission  Gastrointestinal:        Please refer to the history of present illness as it regards to the patient's abdominal pain and subsequently discovered acute cholecystitis by ultrasound and CT scan  The patient maintains that his bowel movements have been regular and unchanged  He expressly denies any nausea or vomiting  Endocrine: Negative  Genitourinary: Negative  Musculoskeletal: Negative  Skin: Negative  Allergic/Immunologic:        The patient states the only allergy that he is aware of this from iodine which caused a hive on his forehead  Neurological: Negative      Hematological: The patient has a significant history for autoimmune hemolytic anemia  The patient underwent open splenectomy by Dr Shade Costa at [facility] in May of 2017  Postoperatively he was discovered to have a portal vein thrombosis and lower extremity DVTs with a small pulmonary embolism  For this he has been placed on Xarelto therapy for an indefinite length of time  The patient continues to lose red cells and has had transfusions during the interim prior to this admission  He follows with Dr Jordyn Azevedo, oncology and is currently on steroid therapy under his care  Psychiatric/Behavioral: Negative          Historical Information   Past Medical History:   Diagnosis Date    Autoimmune hemolytic anemia (HCC)     Hemolytic anemia (HCC)     Palpitation     Tobacco abuse      Past Surgical History:   Procedure Laterality Date    KNEE SURGERY Right     AL REMOVAL SPLEEN, TOTAL N/A 5/18/2017    Procedure: LAPAROSCOPIC HAND ASSIST SPLENECTOMY;  Surgeon: Pradeep Diggs MD;  Location: [facility];  Service: Surgical Oncology    SHOULDER SURGERY Left      Social History   History   Alcohol Use    3 6 oz/week    6 Cans of beer per week     Comment: social     History   Drug Use No     History   Smoking Status    Light Tobacco Smoker    Types: Cigars   Smokeless Tobacco    Current User     Comment: socially     Family History: non-contributory    Meds/Allergies   current meds:   Current Facility-Administered Medications   Medication Dose Route Frequency    acetaminophen (TYLENOL) tablet 650 mg  650 mg Oral Q6H PRN    hydrALAZINE (APRESOLINE) injection 10 mg  10 mg Intravenous Q6H PRN    morphine injection 2 mg  2 mg Intravenous Q4H PRN    nicotine (NICODERM CQ) 7 mg/24hr TD 24 hr patch 1 patch  1 patch Transdermal Daily    ondansetron (ZOFRAN) injection 4 mg  4 mg Intravenous Q6H PRN    pantoprazole (PROTONIX) injection 40 mg  40 mg Intravenous Q12H [address]    predniSONE tablet 30 mg  30 mg Oral Daily    sodium chloride 0 9 % with KCl 20 mEq/L infusion (premix)  100 mL/hr Intravenous Continuous    and PTA meds:   Prior to Admission Medications   Prescriptions Last Dose Informant Patient Reported? Taking?    Ferrous Sulfate (IRON) 325 (65 FE) MG TABS 12/20/2017 at Unknown time  Yes Yes   Sig: Take 325 mg by mouth daily     Multiple Vitamins-Minerals (ONE DAILY MENS) TABS 12/20/2017 at Unknown time  Yes Yes   Sig: Take by mouth   aspirin 81 MG tablet 12/20/2017 at Unknown time  Yes Yes   Sig: Take 81 mg by mouth daily   cyanocobalamin (VITAMIN B-12) 1,000 mcg tablet 12/20/2017 at Unknown time  Yes Yes   Sig: Take 1,000 mcg by mouth daily   ergocalciferol (VITAMIN D2) 50,000 units 12/20/2017 at Unknown time  Yes Yes   Sig: Take 50,000 Units by mouth once a week     influenza inactivated quadrivalent vaccine (FLULAVAL) 0 5 ML ANALLIIA Unknown at Unknown time  No No   Sig: Inject 0 5 mL into the shoulder, thigh, or buttocks prior to discharge (preventative) for up to 1 dose   pantoprazole (PROTONIX) 40 mg tablet 12/20/2017 at Unknown time  No Yes   Sig: Take 1 tablet by mouth daily in the early morning   predniSONE 20 mg tablet 12/20/2017 at Unknown time  No Yes   Sig: Take 1 tablet by mouth daily   rivaroxaban (XARELTO) 15 mg tablet   No No   Sig: Take 1 tablet by mouth 2 (two) times a day with meals for 21 days   rivaroxaban (XARELTO) 20 mg tablet 12/20/2017 at Unknown time  No Yes   Sig: Take 1 tablet by mouth daily with breakfast      Facility-Administered Medications: None     Allergies   Allergen Reactions    Iodinated Diagnostic Agents Hives       Objective   First Vitals:   Blood Pressure: (!) 172/84 (12/21/17 0442)  Pulse: 64 (12/21/17 0442)  Temperature: 97 9 °F (36 6 °C) (12/21/17 0444)  Temp Source: Oral (12/21/17 0444)  Respirations: 15 (12/21/17 0442)  Weight - Scale: 86 2 kg (190 lb) (12/21/17 0442)  SpO2: 99 % (12/21/17 0442)    Current Vitals:   Blood Pressure: (!) 175/93 (12/21/17 1109)  Pulse: 77 (12/21/17 1109)  Temperature: 98 5 °F (36 9 °C) (12/21/17 1109)  Temp Source: Oral (12/21/17 1109)  Respirations: 19 (12/21/17 1109)  Weight - Scale: 86 2 kg (190 lb) (12/21/17 0442)  SpO2: 98 % (12/21/17 1109)      Intake/Output Summary (Last 24 hours) at 12/21/17 1127  Last data filed at 12/21/17 7918   Gross per 24 hour   Intake             1000 ml   Output                0 ml   Net             1000 ml       Invasive Devices     Peripheral Intravenous Line            Peripheral IV 12/21/17 Right less than 1 day                Physical Exam   Constitutional: He is oriented to person, place, and time  The patient is a well developed well nourished active 58-year-old male who works as a   Other than his presenting signs and symptoms the patient denies any changes in his health except as related to his autoimmune hemolytic anemia  HENT:   Head: Normocephalic and atraumatic  Right Ear: Tympanic membrane normal    Left Ear: Hearing normal    Eyes: Conjunctivae, EOM and lids are normal  No scleral icterus  Cardiovascular: Normal rate, regular rhythm, S1 normal and S2 normal     No murmur heard  Pulmonary/Chest: Effort normal and breath sounds normal    Abdominal:   Auscultation reveals normal active bowel sounds  Inspection reveals a midline surgical incision midway between the xiphoid process and umbilicus which is well healed and without detectable hernia  There is also a small scar from the drain site status post the patient's splenectomy in May of 2017  Palpation reveals a uniformly tense abdomen that is supple  There is significant point tenderness to palpation over the dome of the gallbladder without guarding  There is a positive apprehension sign  There is referred pain to the right upper quadrant from the right lower quadrant  There is positive tympany across the entire abdomen which reflects the air-filled loops of bowel noted on CT scan     Musculoskeletal:   The patient moves all 4 extremities main fully and symmetrically  He is well musculature did and fit  Neurological: He is alert and oriented to person, place, and time  He has normal strength  No cranial nerve deficit  Skin: Skin is warm, dry and intact  There is a small puncture site noted at the right iliosacral border status post bone marrow biopsy from 2 days ago  There is no ecchymosis or redness or evidence of swelling in this area  The right paravertebral area is tender  Psychiatric: He has a normal mood and affect   His speech is normal and behavior is normal  Judgment and thought content normal  Cognition and memory are normal        Lab Results:   CBC:   Lab Results   Component Value Date    WBC 15 39 (H) 12/21/2017    HGB 10 6 (L) 12/21/2017    HCT 29 2 (L) 12/21/2017     (H) 12/21/2017     (H) 12/21/2017    MCH 42 6 (H) 12/21/2017    MCHC 36 3 12/21/2017    RDW 17 6 (H) 12/21/2017    MPV 10 1 12/21/2017    NRBC 0 12/21/2017   , CMP:   Lab Results   Component Value Date     12/21/2017    K 3 2 (L) 12/21/2017     12/21/2017    CO2 30 12/21/2017    ANIONGAP 8 12/21/2017    BUN 25 12/21/2017    CREATININE 0 85 12/21/2017    GLUCOSE 95 12/21/2017    CALCIUM 8 4 12/21/2017    AST 32 12/21/2017    ALT 51 12/21/2017    ALKPHOS 58 12/21/2017    PROT 5 8 (L) 12/21/2017    ALBUMIN 3 6 12/21/2017    BILITOT 2 85 (H) 12/21/2017    EGFR 93 12/21/2017   , Urinalysis:   Lab Results   Component Value Date    COLORU Yellow 12/21/2017    CLARITYU Clear 12/21/2017    SPECGRAV 1 015 12/21/2017    PHUR 7 0 12/21/2017    LEUKOCYTESUR Negative 12/21/2017    NITRITE Negative 12/21/2017    PROTEINUA Negative 12/21/2017    GLUCOSEU Negative 12/21/2017    KETONESU Negative 12/21/2017    BILIRUBINUR Negative 12/21/2017    BLOODU Negative 12/21/2017     Imaging: I have personally reviewed pertinent reports   , I have personally reviewed pertinent films in PACS and I have personally reviewed pertinent films in PACS with a Radiologist   EKG, Pathology, and Other Studies: I have personally reviewed pertinent reports  Counseling / Coordination of Care  Total floor / unit time spent today 30 minutes  Greater than 50% of total time was spent with the patient and / or family counseling and / or coordination of care  A description of the counseling / coordination of care

## 2017-12-21 NOTE — ED RE-EVALUATION NOTE
ED Course as of Dec 21 1033   Thu Dec 21, 2017   3116 CT, dispo      1004 Accepted in sign out from Dr Saleem Stewart  60year old male, last [place] 13 [place] ago, RUQ pain, +/- Salas's sign, abnormal labs  CT reviewed by myself looking like decreased flow to segemental piece of liver + thickening of GB + s/p splenectomy  Radiologist suggests possible acute cholecystitis  This does correlate clinically  Case discussed by resident with Dr Milo Lopez who will see patient after formal U/S has been done  She asks that she is called again when the U/S is completed with the read regardless if it is normal or not  No suspicion for malaria at this time  Further, peripheral smears have been completed in the past without demonstration of schistocytes  5174 Went to update patient  Currently being taken by [facility] to [id number] [place] [address] Road Seen by Dr Milo Lopez who states:  - not urgent surgical candidate  -  the patient just had a biopsy and is having severe pain in his abdomen   - outpatient w/u failing, would likely need GI workup  Will admit for such  [address] paged  12 Spoke to patient about admission    - Patient is being evaluated by the surgical PA currently  - Patient is deciding if he wants to be admitted  - I explained that he can get an outpatient workup (through his family doctor) or inpatient workup (being seen by GI here)  - Patient is still deciding   - Surgical PA would like to further evaluate the patient  - I did explain that Dr Milo Lopez is only making the decision if he needs urgent/emergent surgery or not (and that he is not a candidate as he has Xarelto onboard and so could not go to surgery in the next few days until it is out of his system)  - Patient is very angry  - Will hold off on admitting until a decision is made

## 2017-12-21 NOTE — ED NOTES
Patient requesting to speak with the physician regarding his plan of care  Dr Will Traylor made aware        Jamey Villanueva, RN  12/21/17 9045

## 2017-12-21 NOTE — ED NOTES
Per Dr Jacquelyn Villa, ultrasound to come in around 8 am for study  Patient updated        Ginna Foley RN  12/21/17 9508

## 2017-12-21 NOTE — ED ATTENDING ATTESTATION
Abril Arizmendi DO, saw and evaluated the patient  I have discussed the patient with the resident/non-physician practitioner and agree with the resident's/non-physician practitioner's findings, Plan of Care, and MDM as documented in the resident's/non-physician practitioner's note, except where noted  All available labs and Radiology studies were reviewed  At this point I agree with the current assessment done in the Emergency Department  I have conducted an independent evaluation of this patient a history and physical is as follows:    61 yo male with LUQ abd pain  Pt has complicated hx - last November he returned home from [facility] where he worked as  for [employer] for past 15 years and wife noted he was jaundiced  He was found to have Hgb 4 and diagnosed with hemolytic anemia  Since that time he has been getting transfused when needed - feels comfortable with Hgb around 10 5 and last transfusion was Friday (5 days ago) and they have been trying to figure out good dose of prednisone for him  Pt was on decadron Friday, sat, sun and Monday for bone marrow biopsy Tuesday (2 days ago) and felt horrible on it stating "I was miserable, I gained 5 lbs in like 2 hours"  Wife noted abdominal distention while looking at him Tuesday at procedure  Yesterday he noted trouble buttoning his pants and last evening started with LUQ abd pain  Also has hx of PE/DVT and is on xarelto  Denies fever, chills  No N/V/D  On exam pt is distended and tender in RUQ (mild) - neg Salas's, epigastric (mild) and LUQ (moderate)  Agree with plan to  give IVF, morphine, check labs, urine, CT a/p     8106 - Pt taken for CT a/p  Labs reviewed, Hgb 10 5, all other labs WNL  0700 - CT shows - Gallbladder is distended and shows findings suggesting mild wall thickening or adjacent fluid  Several calcified stones present  No significant pericholecystic inflammation    Correlate with clinical findings for acute cholecystitis versus wall   thickening related to systemic etiology     Asymmetric radiographic alteration in the liver perfusion pattern as detailed above, most consistent with alteration in flow related to segmental portal vein thrombus  verall visible portal venous thrombus significantly improved compared to June      Diverticulosis without evidence for diverticulitis      Prior splenectomy  Will have surgeon on call Dr Oli Ni see and evaluate pt      Final diagnoses:   LUQ pain   Thickening of wall of gallbladder   Hemolytic anemia (HCC)   Leukocytosis     Critical Care Time  CritCare Time    Procedures

## 2017-12-22 ENCOUNTER — APPOINTMENT (INPATIENT)
Dept: MRI IMAGING | Facility: HOSPITAL | Age: 62
DRG: 418 | End: 2017-12-22
Payer: COMMERCIAL

## 2017-12-22 PROBLEM — K80.12 CALCULUS OF GALLBLADDER WITH ACUTE ON CHRONIC CHOLECYSTITIS WITHOUT OBSTRUCTION: Status: ACTIVE | Noted: 2017-12-21

## 2017-12-22 LAB
ALBUMIN SERPL BCP-MCNC: 3 G/DL (ref 3.5–5)
ALP SERPL-CCNC: 54 U/L (ref 46–116)
ALT SERPL W P-5'-P-CCNC: 53 U/L (ref 12–78)
ANION GAP SERPL CALCULATED.3IONS-SCNC: 6 MMOL/L (ref 4–13)
AST SERPL W P-5'-P-CCNC: 32 U/L (ref 5–45)
BILIRUB SERPL-MCNC: 2.95 MG/DL (ref 0.2–1)
BUN SERPL-MCNC: 12 MG/DL (ref 5–25)
CALCIUM SERPL-MCNC: 8.4 MG/DL (ref 8.3–10.1)
CHLORIDE SERPL-SCNC: 106 MMOL/L (ref 100–108)
CO2 SERPL-SCNC: 30 MMOL/L (ref 21–32)
CREAT SERPL-MCNC: 0.76 MG/DL (ref 0.6–1.3)
ERYTHROCYTE [DISTWIDTH] IN BLOOD BY AUTOMATED COUNT: 17.3 % (ref 11.6–15.1)
GFR SERPL CREATININE-BSD FRML MDRD: 97 ML/MIN/1.73SQ M
GLUCOSE SERPL-MCNC: 94 MG/DL (ref 65–140)
HCT VFR BLD AUTO: 27.8 % (ref 36.5–49.3)
HGB BLD-MCNC: 9.9 G/DL (ref 12–17)
MCH RBC QN AUTO: 42.1 PG (ref 26.8–34.3)
MCHC RBC AUTO-ENTMCNC: 35.6 G/DL (ref 31.4–37.4)
MCV RBC AUTO: 118 FL (ref 82–98)
PLATELET # BLD AUTO: 372 THOUSANDS/UL (ref 149–390)
PMV BLD AUTO: 10 FL (ref 8.9–12.7)
POTASSIUM SERPL-SCNC: 3.6 MMOL/L (ref 3.5–5.3)
PROT SERPL-MCNC: 5.3 G/DL (ref 6.4–8.2)
RBC # BLD AUTO: 2.35 MILLION/UL (ref 3.88–5.62)
SODIUM SERPL-SCNC: 142 MMOL/L (ref 136–145)
WBC # BLD AUTO: 10.89 THOUSAND/UL (ref 4.31–10.16)

## 2017-12-22 PROCEDURE — 74183 MRI ABD W/O CNTR FLWD CNTR: CPT

## 2017-12-22 PROCEDURE — C9113 INJ PANTOPRAZOLE SODIUM, VIA: HCPCS | Performed by: PHYSICIAN ASSISTANT

## 2017-12-22 PROCEDURE — 76377 3D RENDER W/INTRP POSTPROCES: CPT

## 2017-12-22 PROCEDURE — 85027 COMPLETE CBC AUTOMATED: CPT | Performed by: PHYSICIAN ASSISTANT

## 2017-12-22 PROCEDURE — A9585 GADOBUTROL INJECTION: HCPCS | Performed by: INTERNAL MEDICINE

## 2017-12-22 PROCEDURE — 80053 COMPREHEN METABOLIC PANEL: CPT | Performed by: PHYSICIAN ASSISTANT

## 2017-12-22 RX ADMIN — PREDNISONE 30 MG: 20 TABLET ORAL at 08:31

## 2017-12-22 RX ADMIN — PANTOPRAZOLE SODIUM 40 MG: 40 INJECTION, POWDER, FOR SOLUTION INTRAVENOUS at 08:28

## 2017-12-22 RX ADMIN — GADOBUTROL 8 ML: 604.72 INJECTION INTRAVENOUS at 18:25

## 2017-12-22 RX ADMIN — SODIUM CHLORIDE AND POTASSIUM CHLORIDE 100 ML/HR: .9; .15 SOLUTION INTRAVENOUS at 00:47

## 2017-12-22 RX ADMIN — CEFAZOLIN SODIUM 1000 MG: 1 SOLUTION INTRAVENOUS at 04:35

## 2017-12-22 RX ADMIN — POLYETHYLENE GLYCOL 3350 17 G: 17 POWDER, FOR SOLUTION ORAL at 08:28

## 2017-12-22 RX ADMIN — CEFAZOLIN SODIUM 1000 MG: 1 SOLUTION INTRAVENOUS at 21:11

## 2017-12-22 RX ADMIN — CEFAZOLIN SODIUM 1000 MG: 1 SOLUTION INTRAVENOUS at 14:00

## 2017-12-22 RX ADMIN — PANTOPRAZOLE SODIUM 40 MG: 40 INJECTION, POWDER, FOR SOLUTION INTRAVENOUS at 21:11

## 2017-12-22 NOTE — CASE MANAGEMENT
Initial Clinical Review    Admission: Date/Time/Statement: 12/21/17 @ 1033     Orders Placed This Encounter   Procedures    Inpatient Admission (expected length of stay for this patient is greater than two midnights)     Standing Status:   Standing     Number of Occurrences:   1     Order Specific Question:   Admitting Physician     Answer:   STEW MUJICA [[id number]]     Order Specific Question:   Level of Care     Answer:   Med Surg [[id number]]     Order Specific Question:   Estimated length of stay     Answer:   More than 2 Midnights     Order Specific Question:   Certification     Answer:   I certify that inpatient services are medically necessary for this patient for a duration of greater than two midnights  See H&P and MD Progress Notes for additional information about the patient's course of treatment  ED: Date/Time/Mode of Arrival:   ED Arrival Information     Expected Arrival Acuity Means of Arrival Escorted By Service Admission Type    - 12/21/2017 04:35 Urgent Walk-In Self General Medicine Urgent    Arrival Complaint    abd pain          Chief Complaint:   Chief Complaint   Patient presents with    Abdominal Pain     patient reports upper abdominal pan and "fullness" that started 2 days ago, worse on left side  reports nausea  LBM this morning, normal per patient  History of Illness:  59 yo male comes in for evaluation of increased distention and pain in abdomen  History of portal vein thrombosis on Xarelto also post splenectomy for autoimmune hemolytic anemia  Chronic prednisone use recently switched to Decadron due to bone marrow biopsy that occurred on Tuesday  Had increased distention gone into Tuesday but now is distention +pain  Not worse with meals no change in bowel movements most recently was this morning    Denies fevers chills has nausea without emesis denies chest pain shortness of breath states my stomach is pushing up on my lungs and therefore I can't take a deep breath because of my stomach  Denies lower extremity swelling denies falls or traumas or accidents no external signs of trauma no blood in stool  Vital signs unremarkable  Abdomen distended  Very tympanic to percussion also tender in left upper epigastric and right upper quadrants  Systemically appears well and nontoxic  Abdominal pain Labs fluid pain control CT scan      Decreased p o  intake times 24 hours due to lack of appetite  ED Vital Signs:   ED Triage Vitals   Temperature Pulse Respirations Blood Pressure SpO2   12/21/17 0444 12/21/17 0442 12/21/17 0442 12/21/17 0442 12/21/17 0442   97 9 °F (36 6 °C) 64 15 (!) 172/84 99 %      Temp Source Heart Rate Source Patient Position - Orthostatic VS BP Location FiO2 (%)   12/21/17 0444 12/21/17 0655 12/21/17 0442 12/21/17 0442 --   Oral Monitor Sitting Right arm       Pain Score       12/21/17 0655       5        Wt Readings from Last 1 Encounters:   12/21/17 86 2 kg (190 lb)       Vital Signs (abnormal): BP to 189/87    Abnormal Labs/Diagnostic Test Results:  T Bili 2 85,    D Bili 0 58,   K 3 2,   T Pro 5 8,   Wbc's 15 39,   H/h 10 6 / 29 2,   plats 399,   anc 10 77,   Abs monos 1 56    CT A&P:  Gallbladder is distended and shows findings suggesting mild wall thickening or adjacent fluid   Several calcified stones present   No significant pericholecystic inflammation   Correlate with clinical findings for acute cholecystitis versus wall   thickening related to systemic etiology  Asymmetric radiographic alteration in the liver perfusion pattern as detailed above, most consistent with alteration in flow related to segmental portal vein thrombus   Overall visible portal venous thrombus significantly improved compared to June  Diverticulosis without evidence for diverticulitis    Prior splenectomy    US  RUQ:    Dependent gallbladder sludge and stones with gallbladder wall thickening but no pericholecystic fluid   Sonographic Salas's sign is negative   Again recommend clinical correlation for acute cholecystitis versus gallbladder wall thickening related to   systemic disease, similar to recent CT   No CBD dilatation or choledocholithiasis  2   Fatty liver with left hepatic lobe fatty sparing  ED Treatment:   Medication Administration from 12/21/2017 0435 to 12/21/2017 1126       Date/Time Order Dose Route Action Action by Comments     12/21/2017 0619 sodium chloride 0 9 % bolus 1,000 mL 0 mL Intravenous Stopped William Kurtz RN      12/21/2017 0526 sodium chloride 0 9 % bolus 1,000 mL 1,000 mL Intravenous [address] William Kurtz RN      12/21/2017 0525 morphine (PF) 4 mg/mL injection 4 mg 4 mg Intravenous Given William Kurtz RN      12/21/2017 0615 iohexol (OMNIPAQUE) 350 MG/ML injection (SINGLE-DOSE) 100 mL 100 mL Intravenous Given Juarez Langford      12/21/2017 5668 potassium chloride (K-DUR,KLOR-CON) CR tablet 40 mEq 40 mEq Oral Given William Kurtz RN           Past Medical/Surgical History:    Active Ambulatory Problems     Diagnosis Date Noted    Tobacco abuse     Palpitation     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    Jaundice 11/02/2016    Hyperbilirubinemia 11/02/2016    Hemolytic anemia (HCC)     Symptomatic anemia 02/03/2017    Acute pulmonary embolism ([facility] [address] ) 06/06/2017    Portal vein thrombosis 06/08/2017    Elevated blood pressure reading without diagnosis of hypertension 06/09/2017    GERD (gastroesophageal reflux disease) 11/06/2017    Autoimmune hemolytic anemia (HCC)      Resolved Ambulatory Problems     Diagnosis Date Noted    Shortness of breath 11/06/2017     Past Medical History:   Diagnosis Date    Autoimmune hemolytic anemia (HCC)     Hemolytic anemia (HCC)     Palpitation     Tobacco abuse        Admitting Diagnosis: Biliary colic [P29 88]  Abdominal pain [R10 9]  LUQ pain [R10 12]  Constipation [K59 00]  Obstipation [K59 00]    Age/Sex: 60 y o  male    Assessment/Plan: Principal Problem:    Biliary colic  Active Problems:    Tobacco abuse    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)    Hyperbilirubinemia    Portal vein thrombosis    GERD (gastroesophageal reflux disease)    Obstipation     Plan for the Primary Problem(s):  1  Biliary colic-appreciate the prompt consult by surgery, Bettina Marshall physician assistant and Dr Mónica Manuel  The plan at the time of this dictation is to make the patient NPO for possible laparoscopic cholecystectomy versus percutaneous cholecystectomy  The patient is tender but no acute abdomen  Monitor temperature white blood cell count LFTs and bilirubin closely  Will provide the patient with IVF in the form of normal saline and 20 mEq potassium chloride running at 100 mL an hour      Plan for Additional Problems:   2  Obstipation-the patient is having normal bowel movements without any blood  However the CT scan does show quite a bit of retained fecal material   Appreciate Gastroenterology input on bowel management in light of 1  Will change the PPI over to IV b i d   3   Hemolytic anemia status post splenectomy and recent bone marrow biopsy-the patient is under the care of Dr Tania Gomez  He is currently on 30 mg of prednisone which we will continue  His hemoglobin is 10 6 this is approximately his new baseline  His last transfusion was on Friday  4  History of a DVT and portal vein thrombosis-the patient is on Xarelto his portal vein thrombosis is improving  We will hold the Xarelto in anticipation of potential surgical intervention  5  Tobacco abuse-the patient still smokes an occasional his cigar  He was counseled  A patch will be provided  6  Hypertensive urgency-the patient's blood pressure peaked at 189/87  The patient does not have a diagnosis of essential hypertension he is not on any chronic antihypertensive meds  This is likely secondary to pain and possibly chronic steroids  Will provide hydralazine p r n     The patient does have a slight headache without any vision changes  Will provide APAP     VTE Prophylaxis: secd   Code Status: full code  Anticipated Length of Stay:  Patient will be admitted on an Inpatient basis with an anticipated length of stay of  Greater than 2 midnights  Admission Orders:  NPO  Consult Surgery  Consult GI  OOB as tolerated  CBC,  CMP  SCD's  MRI Abd / MRCP  Consult Oncology    Scheduled Meds:   cefazolin 1,000 mg Intravenous Q8H   nicotine 1 patch Transdermal Daily   pantoprazole 40 mg Intravenous Q12H RAVIN   polyethylene glycol 17 g Oral Daily   predniSONE 30 mg Oral Daily     Continuous Infusions:   sodium chloride 0 9 % with KCl 20 mEq/L 100 mL/hr Last Rate: 100 mL/hr (12/22/17 0047)     PRN Meds:   Acetaminophen x 1    hydrALAZINE    morphine injection    ondansetron  Attestation signed by Hung Quezada MD at 12/21/2017 12:31 PM   The patient was seen and examined by me  The case was reviewed with Ms Jordan PA-C  I agree with the information recorded in her history and physical   I suspect that the patient's symptoms are gallbladder in origin  He will be evaluated by General surgery  He is currently on Xarelto, obviously, in light of this, he cannot have surgery at the moment  This will be placed on hold  He is on this for deep vein thrombosis and portal vein thrombosis which occurred in May of 2017, around the time of his splenectomy  The patient states that he has had the appropriate vaccines for someone who has been splenectomized     12/22: 97 5 - 63 - 20   136/70  T pro 5 3,   Alb 3 0,  T bili 2 95,   Wbc's 10 89,   H/h 9 9 / 27 8    Thank you,  [address] [place] [address] in the [employer] by Juan Schaefer for 2017  Network Utilization Review Department  Phone: [phone]; Fax [phone]  ATTENTION: The Network Utilization Review Department is now centralized for our 7 Facilities  Make a note that we have a new phone and fax numbers for our Department   Please call with any questions or concerns to [phone] and carefully follow the prompts so that you are directed to the right person  All voicemails are confidential  Fax any determinations, approvals, denials, and requests for initial or continue stay review clinical to [phone]  Due to HIGH CALL volume, it would be easier if you could please send faxed requests to expedite your requests and in part, help us provide discharge notifications faster

## 2017-12-22 NOTE — PROGRESS NOTES
The situation was discussed with GI and General surgery  The patient is scheduled for an MRI this evening to evaluate his bile ducts  He continues to have right upper quadrant pain which I believe strongly suggests cholecystitis  He has been withdrawn from [address] [place] [address]  His coagulopathy should have resolved by tomorrow  Assuming his MRI shows nothing unexpected, he has tentatively been scheduled for cholecystectomy by Dr Drew Huber tomorrow  I reviewed this information with the patient in detail and he is agreeable to the above-mentioned plan

## 2017-12-22 NOTE — PROGRESS NOTES
Progress Note - General Surgery   Salina Camp 60 y o  male MRN: [id number]  Unit/Bed#: [place] [address] -[id number] Encounter: [id number]    Assessment & Plan:  1  Acute Calculus Cholecystitis  Leukocytosis improving  T Bili persistently elevated at 2 95  Ultrasound with wall thickening   MRCP pending   Await MRI results before timing of surgical planning, no plans for laparoscopic cholecystectomy during this admission currently  NPO for now  Quinn Saxena PA-C  Date: 12/22/2017 Time: 8:01 AM   Pager: [phone]    Subjective:  Improved RUQ abdominal pain and decreased distention    +Flatus + BM, small, nonbloody, overnight    Objective:    Vitals:   Vitals:    12/21/17 1131 12/21/17 1556 12/21/17 2250 12/22/17 0717   BP: 158/88 146/67 133/78 136/70   Pulse: 78 70 58 63   Resp: 20 18 18 20   Temp: 98 2 °F (36 8 °C) 98 4 °F (36 9 °C) (!) 97 1 °F (36 2 °C) 97 5 °F (36 4 °C)   TempSrc: Oral Tympanic Tympanic Tympanic   SpO2: 98% 95% 98% 97%   Weight:           I/O:   I/O       12/20 0701 - 12/21 0700 12/21 0701 - 12/22 0700 12/22 0701 - 12/23 0700    IV Piggyback 1000      Total Intake(mL/kg) 1000 (11 6)      Net +1000                     Labs:    Results from last 7 days  Lab Units 12/22/17  0511 12/21/17  0525 12/18/17  0941   WBC Thousand/uL 10 89* 15 39* 13 49*   HEMOGLOBIN g/dL 9 9* 10 6* 11 0*   HEMATOCRIT % 27 8* 29 2* 34 4*   PLATELETS Thousands/uL 372 399* 527*   NEUTROS PCT %  --  70  --    MONOS PCT %  --  10  --          Results from last 7 days  Lab Units 12/22/17  0511 12/21/17  0525   SODIUM mmol/L 142 142   POTASSIUM mmol/L 3 6 3 2*   CHLORIDE mmol/L 106 104   CO2 mmol/L 30 30   BUN mg/dL 12 25   CREATININE mg/dL 0 76 0 85   CALCIUM mg/dL 8 4 8 4   TOTAL PROTEIN g/dL 5 3* 5 8*   BILIRUBIN TOTAL mg/dL 2 95* 2 85*   ALK PHOS U/L 54 58   ALT U/L 53 51   AST U/L 32 32   GLUCOSE RANDOM mg/dL 94 95       Exam:  General appearance: normal, alert, cooperative, no distress  Abdomen: soft, non-tender; bowel sounds normal; no masses,  no organomegaly  Midline upper abdominal laparotomy scar with no palpable hernia  Some portions of this record may have been generated with voice recognition software  There may be translation, syntax,  or grammatical errors  Occasional wrong word or "sound-a-like" substitutions may have occurred due to the inherent limitations of the voice recognition software  Read the chart carefully and recognize, using context, where substations may have occurred  If you have any questions, please contact the dictating provider for clarification or correction, as needed         Shannon Luna PA-C  Date: 12/22/2017 Time: 8:01 AM   Pager: [phone]

## 2017-12-22 NOTE — CONSULTS
Consultation - [facility]  Ted Reyes 60 y o  male MRN: [id number]  Unit/Bed#: [place] [address] 2 [facility] 210-01 Encounter: [id number]        Inpatient consult to gastroenterology  Consult performed by: DELORES Simpson  Consult ordered by: Trish Linder          Reason for Consult / Principal Problem:  Constipation and gallstones and reflux      ASSESSMENT AND PLAN:      Constipation:  His abdominal distention and pain are likely due to constipation based on the CT scan result  I will start him on MiraLax and monitor his abdominal exam bowel movements  Gallstones: We will defer to surgery as they are evaluating him for the gallstones  There is currently no evidence of a common bile duct stone  Gastroesophageal reflux disease: We will treat with Protonix BID and monitor his symptoms  ______________________________________________________________________    HPI:  He is a 60-year-old man who came to the hospital with approximately one day of increasing abdominal pain and distention  Described the pain is mainly in the upper abdomen but denies any nausea, vomiting, bleeding or weight loss  He does admit to some mild constipation  He denies any family history of colon polyps or colon cancer  His last colonoscopy was negative a few years ago  His CT scan revealed a distended gallbladder with wall thickening multiple gallstones  Surgery was consulted and they suggested conservative management  She is currently on Xarelto for portal vein thrombosis, DVT, and small pulmonary embolus  REVIEW OF SYSTEMS:    CONSTITUTIONAL: Denies any fever, chills, rigors, and weight loss, weakness  HEENT: No earache or tinnitus  Denies hearing loss or visual disturbances  CARDIOVASCULAR: No chest pain or palpitations  RESPIRATORY: Denies any cough, hemoptysis, but complains of shortness of breath or dyspnea on exertion  GASTROINTESTINAL: As noted in the History of Present Illness     GENITOURINARY: No problems with urination  Denies any hematuria or dysuria  NEUROLOGIC: No dizziness or vertigo, denies headaches  MUSCULOSKELETAL: Denies any muscle or joint pain  SKIN: Denies skin rashes or itching  ENDOCRINE: Denies excessive thirst  Denies intolerance to heat or cold  PSYCHOSOCIAL: Denies depression or anxiety  Denies any recent memory loss  Historical Information   Past Medical History:   Diagnosis Date    Autoimmune hemolytic anemia (HCC)     Hemolytic anemia (HCC)     Palpitation     Tobacco abuse      Past Surgical History:   Procedure Laterality Date    KNEE SURGERY Right     VT REMOVAL SPLEEN, TOTAL N/A 5/18/2017    Procedure: LAPAROSCOPIC HAND ASSIST SPLENECTOMY;  Surgeon: Vanita Rivera MD;  [facility]: [facility];  Service: Surgical Oncology    SHOULDER SURGERY Left      Social History   History   Alcohol Use    3 6 oz/week    6 Cans of beer per week     Comment: social     History   Drug Use No     History   Smoking Status    Light Tobacco Smoker    Types: Cigars   Smokeless Tobacco    Current User     Comment: socially     History reviewed  No pertinent family history      Meds/Allergies     Prescriptions Prior to Admission   Medication    aspirin 81 MG tablet    cyanocobalamin (VITAMIN B-12) 1,000 mcg tablet    ergocalciferol (VITAMIN D2) 50,000 units    Ferrous Sulfate (IRON) 325 (65 FE) MG TABS    Multiple Vitamins-Minerals (ONE DAILY MENS) TABS    pantoprazole (PROTONIX) 40 mg tablet    predniSONE 20 mg tablet    rivaroxaban (XARELTO) 20 mg tablet    influenza inactivated quadrivalent vaccine (FLULAVAL) 0 5 ML ANALILIA    rivaroxaban (XARELTO) 15 mg tablet     Current Facility-Administered Medications   Medication Dose Route Frequency    acetaminophen (TYLENOL) tablet 650 mg  650 mg Oral Q6H PRN    ceFAZolin (ANCEF) IVPB (premix) 1,000 mg  1,000 mg Intravenous Q8H    hydrALAZINE (APRESOLINE) injection 10 mg  10 mg Intravenous Q6H PRN    morphine injection 2 mg  2 mg Intravenous Q4H PRN    nicotine (NICODERM CQ) 7 mg/24hr TD 24 hr patch 1 patch  1 patch Transdermal Daily    ondansetron (ZOFRAN) injection 4 mg  4 mg Intravenous Q6H PRN    pantoprazole (PROTONIX) injection 40 mg  40 mg Intravenous Q12H RAVIN    predniSONE tablet 30 mg  30 mg Oral Daily    sodium chloride 0 9 % with KCl 20 mEq/L infusion (premix)  100 mL/hr Intravenous Continuous       Allergies   Allergen Reactions    Iodinated Diagnostic Agents Hives           Objective     Blood pressure 133/78, pulse 58, temperature (!) 97 1 °F (36 2 °C), temperature source Tympanic, resp  rate 18, weight 86 2 kg (190 lb), SpO2 98 %  Intake/Output Summary (Last 24 hours) at 12/21/17 2336  Last data filed at 12/21/17 3878   Gross per 24 hour   Intake             1000 ml   Output                0 ml   Net             1000 ml         PHYSICAL EXAM:      General Appearance:   Alert, cooperative, no distress   HEENT:   Normocephalic, atraumatic, anicteric      Neck:  Supple, symmetrical, trachea midline   Lungs:   Clear to auscultation bilaterally; no rales, rhonchi or wheezing; respirations unlabored    Heart[de-identified]   Regular rate and rhythm; no murmur, rub, or gallop     Abdomen:   Soft, upper abdominal tenderness, non-distended; normal bowel sounds; no masses, no organomegaly    Genitalia:   Deferred    Rectal:   Deferred    Extremities:  No cyanosis, clubbing or edema    Pulses:  2+ and symmetric all extremities    Skin:  No jaundice, rashes, or lesions    Lymph nodes:  No palpable cervical lymphadenopathy        Lab Results:   Admission on 12/21/2017   Component Date Value    WBC 12/21/2017 15 39*    RBC 12/21/2017 2 49*    Hemoglobin 12/21/2017 10 6*    Hematocrit 12/21/2017 29 2*    MCV 12/21/2017 117*    MCH 12/21/2017 42 6*    MCHC 12/21/2017 36 3     RDW 12/21/2017 17 6*    MPV 12/21/2017 10 1     Platelets 79/11/1105 399*    nRBC 12/21/2017 0     Neutrophils Relative 12/21/2017 70     Lymphocytes Relative 12/21/2017 18     Monocytes Relative 12/21/2017 10     Eosinophils Relative 12/21/2017 2     Basophils Relative 12/21/2017 0     Neutrophils Absolute 12/21/2017 10 77*    Lymphocytes Absolute 12/21/2017 2 70     Monocytes Absolute 12/21/2017 1 56*    Eosinophils Absolute 12/21/2017 0 35     Basophils Absolute 12/21/2017 0 01     Sodium 12/21/2017 142     Potassium 12/21/2017 3 2*    Chloride 12/21/2017 104     CO2 12/21/2017 30     Anion Gap 12/21/2017 8     BUN 12/21/2017 25     Creatinine 12/21/2017 0 85     Glucose 12/21/2017 95     Calcium 12/21/2017 8 4     AST 12/21/2017 32     ALT 12/21/2017 51     Alkaline Phosphatase 12/21/2017 58     Total Protein 12/21/2017 5 8*    Albumin 12/21/2017 3 6     Total Bilirubin 12/21/2017 2 85*    eGFR 12/21/2017 93     Lipase 12/21/2017 215     Bilirubin, Direct 12/21/2017 0 58*    Color, UA 12/21/2017 Yellow     Clarity, UA 12/21/2017 Clear     pH, UA 12/21/2017 7 0     Leukocytes, UA 12/21/2017 Negative     Nitrite, UA 12/21/2017 Negative     Protein, UA 12/21/2017 Negative     Glucose, UA 12/21/2017 Negative     Ketones, UA 12/21/2017 Negative     Urobilinogen, UA 12/21/2017 1 0     Bilirubin, UA 12/21/2017 Negative     Blood, UA 12/21/2017 Negative     Specific Gravity, UA 12/21/2017 1 015        Imaging Studies: I have personally reviewed pertinent imaging studies

## 2017-12-22 NOTE — SOCIAL WORK
CM met with patient at beside to address discharge needs  Patient lives in a house with his wife, Livia Mooney; bedroom is located on the 2nd floor, patient denies difficulty with steps  Patient is independent with ADLs and functional mobility  Food shopping & meal prep is done by both patient and wife  Patient denies use of DME  No hx of VNA reported  Patient is employed  PCP identified as Dr Matias Queen  No POA identified, but wife is both healthcare representative and designated caregiver if needed  Patient uses [facility] in [place] for Rx needs; patient made aware of [address] to use at discharge if needed  Patient does drive; reports wife available at discharge to transport home  Help/support reported  Patient made aware of CM's name, number and role  CM to follow as case progresses and needs are determined

## 2017-12-22 NOTE — PROGRESS NOTES
After examination of the patient and review of the chart I believe his symptom complex is consistent with the diagnosis of acute calculous cholecystitis for which he would benefit from definitive surgery during this hospitalization  The options benefits risks and alternatives to surgery were thoroughly discussed and nathan and honest fashion  Specific risks related to anesthesia, bleeding, infection, postoperative bile leak or common bile duct injury necessitating additional surgical interventions were all explained in clear simple terms  All questions answered to his satisfaction and informed consent obtained to proceed

## 2017-12-22 NOTE — PLAN OF CARE

## 2017-12-22 NOTE — PLAN OF CARE
Problem: Potential for Falls  Goal: Patient will remain free of falls  INTERVENTIONS:  - Assess patient frequently for physical needs  -  Identify cognitive and physical deficits and behaviors that affect risk of falls    -  [place] fall precautions as indicated by assessment   - Educate patient/family on patient safety including physical limitations  - Instruct patient to call for assistance with activity based on assessment  - Modify environment to reduce risk of injury  - Consider OT/PT consult to assist with strengthening/mobility   Outcome: Progressing      Problem: PAIN - ADULT  Goal: Verbalizes/displays adequate comfort level or baseline comfort level  Interventions:  - Encourage patient to monitor pain and request assistance  - Assess pain using appropriate pain scale  - Administer analgesics based on type and severity of pain and evaluate response  - Implement non-pharmacological measures as appropriate and evaluate response  - Consider cultural and social influences on pain and pain management  - Notify physician/advanced practitioner if interventions unsuccessful or patient reports new pain   Outcome: Progressing      Problem: INFECTION - ADULT  Goal: Absence or prevention of progression during hospitalization  INTERVENTIONS:  - Assess and monitor for signs and symptoms of infection  - Monitor lab/diagnostic results  - Monitor all insertion sites, i e  indwelling lines, tubes, and drains  - Monitor endotracheal (as able) and nasal secretions for changes in amount and color  - [place] appropriate cooling/warming therapies per order  - Administer medications as ordered  - Instruct and encourage patient and family to use good hand hygiene technique  - Identify and instruct in appropriate isolation precautions for identified infection/condition   Outcome: Progressing    Goal: Absence of fever/infection during neutropenic period  INTERVENTIONS:  - Monitor WBC  - Implement neutropenic guidelines   Outcome: Progressing      Problem: SAFETY ADULT  Goal: Maintain or return to baseline ADL function  INTERVENTIONS:  -  Assess patient's ability to carry out ADLs; assess patient's baseline for ADL function and identify physical deficits which impact ability to perform ADLs (bathing, care of mouth/teeth, toileting, grooming, dressing, etc )  - Assess/evaluate cause of self-care deficits   - Assess range of motion  - Assess patient's mobility; develop plan if impaired  - Assess patient's need for assistive devices and provide as appropriate  - Encourage maximum independence but intervene and supervise when necessary  ¯ Involve family in performance of ADLs  ¯ Assess for home care needs following discharge   ¯ Request OT consult to assist with ADL evaluation and planning for discharge  ¯ Provide patient education as appropriate   Outcome: Progressing    Goal: Maintain or return mobility status to optimal level  INTERVENTIONS:  - Assess patient's baseline mobility status (ambulation, transfers, stairs, etc )    - Identify cognitive and physical deficits and behaviors that affect mobility  - Identify mobility aids required to assist with transfers and/or ambulation (gait belt, sit-to-stand, lift, walker, cane, etc )  - [place] fall precautions as indicated by assessment  - Record patient progress and toleration of activity level on Mobility SBAR; progress patient to next Phase/Stage  - Instruct patient to call for assistance with activity based on assessment  - Request Rehabilitation consult to assist with strengthening/weightbearing, etc    Outcome: Progressing      Problem: Knowledge Deficit  Goal: Patient/family/caregiver demonstrates understanding of disease process, treatment plan, medications, and discharge instructions  Complete learning assessment and assess knowledge base    Interventions:  - Provide teaching at level of understanding  - Provide teaching via preferred learning methods   Outcome: Progressing

## 2017-12-22 NOTE — PROGRESS NOTES
Progress Note - Jamee Ireland 60 y o  male MRN: [id number]    Unit/Bed#: [place] [address] -01 Encounter: [id number]         Assessment/ Plan:  GERD  Constipation  Gallstones  Elevated bilirubin  RUQ pain    1  RUQ pain/ gallstones - he does have pain with palpation but he states it seems improved compared to yesterday  MRI pending  Surgery following  TB elevations likely from hemolytic anemia    -Follow up MRI    2  Constipation - resolved  Continue bowel regimen  3  GERD - no sxs  Continue PPI      Subjective:   Pt seen & examined  No complaints  States he did have a BM  Objective:     Vitals: Blood pressure 136/70, pulse 63, temperature 97 5 °F (36 4 °C), temperature source Tympanic, resp  rate 20, weight 86 2 kg (190 lb), SpO2 97 %  ,Body mass index is 28 06 kg/m²  Physical Exam: General appearance: alert, cooperative and no distress  Lungs: clear to auscultation bilaterally  Heart: regular rate and rhythm  Abdomen: +BS, soft, RUQ TTP  Skin: Skin color, texture, turgor normal  No rashes or lesions     Invasive Devices     Peripheral Intravenous Line            Peripheral IV 12/21/17 Right 1 day                Lab, Imaging and other studies: I have personally reviewed pertinent reports       CBC:   Lab Results   Component Value Date    WBC 10 89 (H) 12/22/2017    HGB 9 9 (L) 12/22/2017    HCT 27 8 (L) 12/22/2017     (H) 12/22/2017     12/22/2017    MCH 42 1 (H) 12/22/2017    MCHC 35 6 12/22/2017    RDW 17 3 (H) 12/22/2017    MPV 10 0 12/22/2017   ,   CMP:   Lab Results   Component Value Date     12/22/2017    K 3 6 12/22/2017     12/22/2017    CO2 30 12/22/2017    ANIONGAP 6 12/22/2017    BUN 12 12/22/2017    CREATININE 0 76 12/22/2017    GLUCOSE 94 12/22/2017    CALCIUM 8 4 12/22/2017    AST 32 12/22/2017    ALT 53 12/22/2017    ALKPHOS 54 12/22/2017    PROT 5 3 (L) 12/22/2017    ALBUMIN 3 0 (L) 12/22/2017    BILITOT 2 95 (H) 12/22/2017    EGFR 97 12/22/2017   ,

## 2017-12-22 NOTE — PROGRESS NOTES
Progress Note - Jamee Ireland 60 y o  male MRN: [id number]    Unit/Bed#: [place] [address] -01 Encounter: [id number]      Subjective: The patient feels somewhat better today  His bowels moved several times  He feels less distended  He still has some right upper quadrant pain  He has no nausea or vomiting  Physical Exam:   Temp:  [97 1 °F (36 2 °C)-98 5 °F (36 9 °C)] 97 5 °F (36 4 °C)  HR:  [58-78] 63  Resp:  [18-20] 20  BP: (133-175)/(67-93) 136/70    Gen:  Well-developed, well-nourished, in no distress  Neck:  Supple  No lymphadenopathy, goiter, or bruit  Heart:  Regular rhythm  No murmur, gallop, or rub  Lungs:  Clear to auscultation and percussion  No wheezing, rales, or rhonchi    Abd:  Soft with active bowel sounds  Right upper quadrant tenderness is noted  There is no mass, rebound, or organomegaly  Extremities:  No clubbing, cyanosis, or edema  No calf tenderness  Neuro:  Alert and oriented    No focal sign  Skin:  Warm and dry      LABS:   CBC:   Lab Results   Component Value Date    WBC 10 89 (H) 12/22/2017    HGB 9 9 (L) 12/22/2017    HCT 27 8 (L) 12/22/2017     (H) 12/22/2017     12/22/2017    MCH 42 1 (H) 12/22/2017    MCHC 35 6 12/22/2017    RDW 17 3 (H) 12/22/2017    MPV 10 0 12/22/2017   , CMP:   Lab Results   Component Value Date     12/22/2017    K 3 6 12/22/2017     12/22/2017    CO2 30 12/22/2017    ANIONGAP 6 12/22/2017    BUN 12 12/22/2017    CREATININE 0 76 12/22/2017    GLUCOSE 94 12/22/2017    CALCIUM 8 4 12/22/2017    AST 32 12/22/2017    ALT 53 12/22/2017    ALKPHOS 54 12/22/2017    PROT 5 3 (L) 12/22/2017    ALBUMIN 3 0 (L) 12/22/2017    BILITOT 2 95 (H) 12/22/2017    EGFR 97 12/22/2017           Patient Active Problem List   Diagnosis    Tobacco abuse    Palpitation    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)    Jaundice    Hyperbilirubinemia    Hemolytic anemia (HCC)    Symptomatic anemia    Acute pulmonary embolism (HCC)    Portal vein thrombosis    Elevated blood pressure reading without diagnosis of hypertension    GERD (gastroesophageal reflux disease)    Autoimmune hemolytic anemia (HCC)    Biliary colic    Obstipation       Assessment/Plan:  1  Abdominal pain and distension, secondary to gallbladder disease verses excessive stool  2  Cholelithiasis with gallbladder wall thickening  3  Hemolytic anemia  4  Hyperbilirubinemia secondary to 3   5   History of portal vein thrombosis, DVT of the leg, and pulmonary embolism May 2017  6  Cigarette abuse    The patient has been scheduled for an MRI of his biliary tract  Further intervention will be planned based on that study  He will be advanced to clear liquid since he is not going to surgery today  Xarelto has been on hold  It should be held 48 hours before surgery is undertaken, if this proves necessary      VTE Pharmacologic Prophylaxis: Reason for no pharmacologic prophylaxis Impending surgery  VTE Mechanical Prophylaxis: sequential compression device

## 2017-12-23 ENCOUNTER — GENERIC CONVERSION - ENCOUNTER (OUTPATIENT)
Dept: OTHER | Facility: OTHER | Age: 62
End: 2017-12-23

## 2017-12-23 ENCOUNTER — ANESTHESIA (INPATIENT)
Dept: PERIOP | Facility: HOSPITAL | Age: 62
DRG: 418 | End: 2017-12-23
Payer: COMMERCIAL

## 2017-12-23 ENCOUNTER — APPOINTMENT (INPATIENT)
Dept: RADIOLOGY | Facility: HOSPITAL | Age: 62
DRG: 418 | End: 2017-12-23
Payer: COMMERCIAL

## 2017-12-23 ENCOUNTER — ANESTHESIA EVENT (INPATIENT)
Dept: PERIOP | Facility: HOSPITAL | Age: 62
DRG: 418 | End: 2017-12-23
Payer: COMMERCIAL

## 2017-12-23 VITALS
HEART RATE: 76 BPM | TEMPERATURE: 98.4 F | SYSTOLIC BLOOD PRESSURE: 164 MMHG | RESPIRATION RATE: 14 BRPM | WEIGHT: 190 LBS | BODY MASS INDEX: 28.06 KG/M2 | OXYGEN SATURATION: 99 % | DIASTOLIC BLOOD PRESSURE: 93 MMHG

## 2017-12-23 PROCEDURE — C9113 INJ PANTOPRAZOLE SODIUM, VIA: HCPCS | Performed by: PHYSICIAN ASSISTANT

## 2017-12-23 PROCEDURE — 74300 X-RAY BILE DUCTS/PANCREAS: CPT

## 2017-12-23 PROCEDURE — 88304 TISSUE EXAM BY PATHOLOGIST: CPT | Performed by: SURGERY

## 2017-12-23 PROCEDURE — 0FT44ZZ RESECTION OF GALLBLADDER, PERCUTANEOUS ENDOSCOPIC APPROACH: ICD-10-PCS | Performed by: SURGERY

## 2017-12-23 RX ORDER — ONDANSETRON 2 MG/ML
4 INJECTION INTRAMUSCULAR; INTRAVENOUS ONCE AS NEEDED
Status: DISCONTINUED | OUTPATIENT
Start: 2017-12-23 | End: 2017-12-23 | Stop reason: HOSPADM

## 2017-12-23 RX ORDER — MAGNESIUM HYDROXIDE 1200 MG/15ML
LIQUID ORAL AS NEEDED
Status: DISCONTINUED | OUTPATIENT
Start: 2017-12-23 | End: 2017-12-23 | Stop reason: HOSPADM

## 2017-12-23 RX ORDER — OXYCODONE HYDROCHLORIDE AND ACETAMINOPHEN 5; 325 MG/1; MG/1
2 TABLET ORAL EVERY 4 HOURS PRN
Refills: 0
Start: 2017-12-23 | End: 2018-01-02

## 2017-12-23 RX ORDER — ONDANSETRON 2 MG/ML
INJECTION INTRAMUSCULAR; INTRAVENOUS AS NEEDED
Status: DISCONTINUED | OUTPATIENT
Start: 2017-12-23 | End: 2017-12-23 | Stop reason: SURG

## 2017-12-23 RX ORDER — ROCURONIUM BROMIDE 10 MG/ML
INJECTION, SOLUTION INTRAVENOUS AS NEEDED
Status: DISCONTINUED | OUTPATIENT
Start: 2017-12-23 | End: 2017-12-23 | Stop reason: SURG

## 2017-12-23 RX ORDER — NICOTINE 21 MG/24HR
1 PATCH, TRANSDERMAL 24 HOURS TRANSDERMAL DAILY
Qty: 7 PATCH | Refills: 0
Start: 2017-12-23 | End: 2018-02-19 | Stop reason: CLARIF

## 2017-12-23 RX ORDER — OXYCODONE HYDROCHLORIDE AND ACETAMINOPHEN 5; 325 MG/1; MG/1
2 TABLET ORAL EVERY 4 HOURS PRN
Status: DISCONTINUED | OUTPATIENT
Start: 2017-12-23 | End: 2017-12-23 | Stop reason: HOSPADM

## 2017-12-23 RX ORDER — BUPIVACAINE HYDROCHLORIDE 5 MG/ML
INJECTION, SOLUTION PERINEURAL AS NEEDED
Status: DISCONTINUED | OUTPATIENT
Start: 2017-12-23 | End: 2017-12-23 | Stop reason: HOSPADM

## 2017-12-23 RX ORDER — FENTANYL CITRATE/PF 50 MCG/ML
50 SYRINGE (ML) INJECTION
Status: DISCONTINUED | OUTPATIENT
Start: 2017-12-23 | End: 2017-12-23 | Stop reason: HOSPADM

## 2017-12-23 RX ORDER — GLYCOPYRROLATE 0.2 MG/ML
INJECTION INTRAMUSCULAR; INTRAVENOUS AS NEEDED
Status: DISCONTINUED | OUTPATIENT
Start: 2017-12-23 | End: 2017-12-23 | Stop reason: SURG

## 2017-12-23 RX ORDER — PROPOFOL 10 MG/ML
INJECTION, EMULSION INTRAVENOUS AS NEEDED
Status: DISCONTINUED | OUTPATIENT
Start: 2017-12-23 | End: 2017-12-23 | Stop reason: SURG

## 2017-12-23 RX ORDER — FENTANYL CITRATE 50 UG/ML
INJECTION, SOLUTION INTRAMUSCULAR; INTRAVENOUS AS NEEDED
Status: DISCONTINUED | OUTPATIENT
Start: 2017-12-23 | End: 2017-12-23 | Stop reason: SURG

## 2017-12-23 RX ORDER — MEPERIDINE HYDROCHLORIDE 50 MG/ML
12.5 INJECTION INTRAMUSCULAR; INTRAVENOUS; SUBCUTANEOUS ONCE AS NEEDED
Status: DISCONTINUED | OUTPATIENT
Start: 2017-12-23 | End: 2017-12-23 | Stop reason: HOSPADM

## 2017-12-23 RX ORDER — MIDAZOLAM HYDROCHLORIDE 1 MG/ML
INJECTION INTRAMUSCULAR; INTRAVENOUS AS NEEDED
Status: DISCONTINUED | OUTPATIENT
Start: 2017-12-23 | End: 2017-12-23 | Stop reason: SURG

## 2017-12-23 RX ORDER — SODIUM CHLORIDE 9 MG/ML
INJECTION, SOLUTION INTRAVENOUS CONTINUOUS PRN
Status: DISCONTINUED | OUTPATIENT
Start: 2017-12-23 | End: 2017-12-23 | Stop reason: SURG

## 2017-12-23 RX ORDER — HYDROMORPHONE HYDROCHLORIDE 2 MG/ML
INJECTION, SOLUTION INTRAMUSCULAR; INTRAVENOUS; SUBCUTANEOUS AS NEEDED
Status: DISCONTINUED | OUTPATIENT
Start: 2017-12-23 | End: 2017-12-23 | Stop reason: SURG

## 2017-12-23 RX ORDER — LIDOCAINE HYDROCHLORIDE 10 MG/ML
INJECTION, SOLUTION INFILTRATION; PERINEURAL AS NEEDED
Status: DISCONTINUED | OUTPATIENT
Start: 2017-12-23 | End: 2017-12-23 | Stop reason: SURG

## 2017-12-23 RX ADMIN — FENTANYL CITRATE 50 MCG: 50 INJECTION INTRAMUSCULAR; INTRAVENOUS at 12:25

## 2017-12-23 RX ADMIN — CEFAZOLIN SODIUM 1000 MG: 1 SOLUTION INTRAVENOUS at 05:17

## 2017-12-23 RX ADMIN — PANTOPRAZOLE SODIUM 40 MG: 40 INJECTION, POWDER, FOR SOLUTION INTRAVENOUS at 08:54

## 2017-12-23 RX ADMIN — DEXAMETHASONE SODIUM PHOSPHATE 4 MG: 10 INJECTION INTRAMUSCULAR; INTRAVENOUS at 11:49

## 2017-12-23 RX ADMIN — ROCURONIUM BROMIDE 10 MG: 10 INJECTION INTRAVENOUS at 12:17

## 2017-12-23 RX ADMIN — PROPOFOL 200 MG: 10 INJECTION, EMULSION INTRAVENOUS at 11:37

## 2017-12-23 RX ADMIN — HYDROMORPHONE HYDROCHLORIDE 0.5 MG: 2 INJECTION, SOLUTION INTRAMUSCULAR; INTRAVENOUS; SUBCUTANEOUS at 11:56

## 2017-12-23 RX ADMIN — FENTANYL CITRATE 100 MCG: 50 INJECTION INTRAMUSCULAR; INTRAVENOUS at 11:31

## 2017-12-23 RX ADMIN — SODIUM CHLORIDE: 0.9 INJECTION, SOLUTION INTRAVENOUS at 12:38

## 2017-12-23 RX ADMIN — FENTANYL CITRATE 100 MCG: 50 INJECTION INTRAMUSCULAR; INTRAVENOUS at 11:47

## 2017-12-23 RX ADMIN — MIDAZOLAM HYDROCHLORIDE 2 MG: 1 INJECTION, SOLUTION INTRAMUSCULAR; INTRAVENOUS at 11:28

## 2017-12-23 RX ADMIN — NEOSTIGMINE METHYLSULFATE 3 MG: 1 INJECTION, SOLUTION INTRAMUSCULAR; INTRAVENOUS; SUBCUTANEOUS at 12:39

## 2017-12-23 RX ADMIN — ONDANSETRON HYDROCHLORIDE 4 MG: 2 INJECTION, SOLUTION INTRAVENOUS at 11:49

## 2017-12-23 RX ADMIN — CEFAZOLIN SODIUM 2000 MG: 1 SOLUTION INTRAVENOUS at 11:41

## 2017-12-23 RX ADMIN — PREDNISONE 30 MG: 20 TABLET ORAL at 08:51

## 2017-12-23 RX ADMIN — SODIUM CHLORIDE: 0.9 INJECTION, SOLUTION INTRAVENOUS at 11:22

## 2017-12-23 RX ADMIN — LIDOCAINE HYDROCHLORIDE 60 MG: 10 INJECTION, SOLUTION INFILTRATION; PERINEURAL at 11:37

## 2017-12-23 RX ADMIN — GLYCOPYRROLATE 0.6 MG: 0.2 INJECTION, SOLUTION INTRAMUSCULAR; INTRAVENOUS at 12:39

## 2017-12-23 RX ADMIN — ROCURONIUM BROMIDE 50 MG: 10 INJECTION INTRAVENOUS at 11:37

## 2017-12-23 NOTE — ANESTHESIA PREPROCEDURE EVALUATION
Review of Systems/Medical History  Patient summary reviewed  Chart reviewed      Cardiovascular   Pulmonary  Smoker cigar smoker , Tobacco cessation counseling given, ,        GI/Hepatic    GERD , No liver disease, ,   Comment: cholecystitis          Endo/Other     GYN       Hematology  Anemia anemia of chronic disease,    Comment: Hemolytic anemia   Musculoskeletal       Neurology   Psychology           Physical Exam    Airway    Mallampati score: I  TM Distance: <3 FB  Neck ROM: full     Dental   No notable dental hx     Cardiovascular  Rhythm: regular, Rate: normal, Cardiovascular exam normal    Pulmonary  Pulmonary exam normal     Other Findings        Anesthesia Plan  ASA Score- 2     Anesthesia Type- general with ASA Monitors  Additional Monitors:   Airway Plan: ETT  Plan Factors- Patient instructed to abstain from smoking on day of procedure  Patient smoked on day of surgery  Induction- intravenous  Postoperative Plan- Plan for postoperative opioid use  Planned trial extubation    Informed Consent- Anesthetic plan and risks discussed with patient

## 2017-12-23 NOTE — PLAN OF CARE
DISCHARGE PLANNING - CARE MANAGEMENT     Discharge to post-acute care or home with appropriate resources Adequate for Discharge        INFECTION - ADULT     Absence or prevention of progression during hospitalization Adequate for Discharge     Absence of fever/infection during neutropenic period Adequate for Discharge        Knowledge Deficit     Patient/family/caregiver demonstrates understanding of disease process, treatment plan, medications, and discharge instructions Adequate for Discharge        PAIN - ADULT     Verbalizes/displays adequate comfort level or baseline comfort level Adequate for Discharge        Potential for Falls     Patient will remain free of falls Adequate for Discharge        SAFETY ADULT     Maintain or return to baseline ADL function Adequate for Discharge     Maintain or return mobility status to optimal level Adequate for Discharge

## 2017-12-23 NOTE — ANESTHESIA POSTPROCEDURE EVALUATION
Post-Op Assessment Note      CV Status:  Stable    Mental Status:  Alert and awake    Hydration Status:  Euvolemic    PONV Controlled:  Controlled    Airway Patency:  Patent    Post Op Vitals Reviewed:  Yes              /83 (12/23/17 1331)    Temp 98 °F (36 7 °C) (12/23/17 1331)    Pulse 71 (12/23/17 1331)   Resp 12 (12/23/17 1331)    SpO2 94 % (12/23/17 1331)

## 2017-12-23 NOTE — CONSULTS
Consultation - Martha Julian 60 y o  male MRN: [id number]    Unit/Bed#: [place] [facility] 2 -01 Encounter: [id number]      Assessment/Plan     Assessment:  In summary, this is a 57-year-old male history of autoimmune hemolytic anemia as outlined  He had recent bone marrow biopsy to investigate for the possibility of lymphoproliferative disorder underlying  Previous flow cytometry was inconclusive  The possibility of pigment related stones is considered although this has obviously been addressed through the surgical procedure itself  Treatment for his hemolytic process is ongoing  For the moment I would favor continuing on prednisone at previous dose and schedule  Follow-up as previously scheduled  Blood work is ongoing on a weekly basis with steroid adjustment accordingly  I reviewed the above with the patient  He voiced understanding and agreement  Plan:  See above  History of Present Illness     HPI: Martha Julian is a 60y o  year old male who presents with abrupt onset abdominal pain Wednesday night  Imaging indicated cholelithiasis with cholecystitis  WBC 10 8, hemoglobin 9 9, platelet count 188,   Normal differential CMP showed bilirubin 2 95, otherwise normal   Patient had previously been diagnosed with autoimmune hemolytic anemia about a year and half ago  He was treated with steroids on an off as well as Rituxan without good efficacy  He underwent splenectomy  This was complicated by pulmonary embolism  He had brief response but recurrent hemolysis thereafter  Most recently he has been on prednisone at approximately 20-30 mg per day  A brief course of high-dose dexamethasone, 40 mg per day x4 days was instituted about a week and half ago  He presented with abrupt onset of abdominal pain  He had a cholecystectomy laparoscopically today        Inpatient consult to Oncology  Consult performed by: Sarah Matson ordered by: Shannon Ramos          Review of Systems Constitutional: Negative for chills and fever  HENT: Negative for sore throat  Eyes: Negative for pain  Respiratory: Negative for cough, chest tightness and shortness of breath  Cardiovascular: Negative for chest pain and palpitations  Gastrointestinal: Negative for blood in stool, constipation, diarrhea, nausea and vomiting  Endocrine: Negative for polydipsia and polyuria  Genitourinary: Negative for difficulty urinating, frequency, hematuria and urgency  Musculoskeletal: Negative for arthralgias, joint swelling and myalgias  Skin: Negative for color change and rash  Neurological: Negative for dizziness, seizures, syncope, numbness and headaches  Hematological: Negative for adenopathy  Does not bruise/bleed easily  Psychiatric/Behavioral: Negative for agitation, dysphoric mood and hallucinations  The patient is not nervous/anxious  Historical Information   Past Medical History:   Diagnosis Date    Autoimmune hemolytic anemia (HCC)     Hemolytic anemia (HCC)     Palpitation     Tobacco abuse      Past Surgical History:   Procedure Laterality Date    KNEE SURGERY Right     [place] REMOVAL SPLEEN, TOTAL N/A 5/18/2017    Procedure: LAPAROSCOPIC HAND ASSIST SPLENECTOMY;  Surgeon: Marito Lira MD;  Location: [facility];  Service: Surgical Oncology    SHOULDER SURGERY Left      Social History   History   Alcohol Use    3 6 oz/week    6 Cans of beer per week     Comment: social     History   Drug Use No     History   Smoking Status    Light Tobacco Smoker    Types: Cigars   Smokeless Tobacco    Current User     Comment: socially     Family History: History reviewed  No pertinent family history  Meds/Allergies   all current active meds have been reviewed  Allergies   Allergen Reactions    Iodinated Diagnostic Agents Hives       Objective   Vitals: Blood pressure 164/93, pulse 76, temperature 98 4 °F (36 9 °C), temperature source Tympanic, resp   rate 14, weight 86 2 kg (190 lb), SpO2 99 %  Intake/Output Summary (Last 24 hours) at 12/23/17 1510  Last data filed at 12/23/17 1328   Gross per 24 hour   Intake             1300 ml   Output                0 ml   Net             1300 ml     Invasive Devices     Drain            NG/OG/Enteral Tube Orogastric 18 Fr Center mouth less than 1 day                Physical Exam   Constitutional: He is oriented to person, place, and time  He appears well-developed  HENT:   Head: Normocephalic  Eyes: Pupils are equal, round, and reactive to light  Neck: Neck supple  Cardiovascular: Normal rate and regular rhythm  No murmur heard  Pulmonary/Chest: Breath sounds normal  He has no wheezes  He has no rales  Abdominal: Soft  There is no tenderness  Musculoskeletal: Normal range of motion  He exhibits no edema or tenderness  Lymphadenopathy:     He has no cervical adenopathy  Neurological: He is alert and oriented to person, place, and time  He has normal reflexes  No cranial nerve deficit  Skin: No rash noted  No erythema  Psychiatric: He has a normal mood and affect  His behavior is normal        Lab Results: I have personally reviewed pertinent reports  Imaging Studies: I have personally reviewed pertinent reports  EKG, Pathology, and Other Studies: I have personally reviewed pertinent reports  Code Status: Level 1 - Full Code  Advance Directive and Living Will:      Power of :    POLST:      Counseling / Coordination of Care  Total floor / unit time spent today 25 minutes  Greater than 50% of total time was spent with the patient and / or family counseling and / or coordination of care  A description of the counseling / coordination of care: see note

## 2017-12-23 NOTE — OP NOTE
OPERATIVE REPORT  PATIENT NAME: Virginia Pinzon    :  1955  MRN: [id number]  Pt Location: [facility] ROOM 06    SURGERY DATE: 2017    Surgeon(s) and Role:     * Sherin Inman MD - Primary     * Ami Chavez PA-C - Assisting  The [de-identified] assistant was necessary in order to provide expert assistance and optimize patient's safety  Preop Diagnosis:  Calculus of gallbladder with acute on chronic cholecystitis without obstruction [K80 12]    Post-Op Diagnosis Codes:     * Calculus of gallbladder with acute on chronic cholecystitis without obstruction [K80 12]    Procedure(s) (LRB):  CHOLECYSTECTOMY LAPAROSCOPIC with cholangiogram (N/A)    Specimen(s):  ID Type Source Tests Collected by Time Destination   1 :  Tissue Gallbladder TISSUE Malika Inman MD 2017 1212        Estimated Blood Loss:   Minimal    Drains:  None    Anesthesia Type:   General    Operative Indications:  Calculus of gallbladder with acute on chronic cholecystitis without obstruction   The patient presented to [address] [place] [address] with signs and symptoms of acute calculous cholecystitis for which definitive treatment during this hospitalization is now indicated  Operative Findings:  The patient was found to have an acutely inflamed gallbladder  A laparoscopic cholecystectomy performed in the routine fashion  The cystic duct and cystic artery confidently identified  The cystic artery divided between clips  Clip placed on the upside the cystic duct, a nick made in the cystic duct and a cholangiogram catheter easily advanced and secured  An attempt made at intraoperative cholangiogram but due to extravasation of contrast likely due to the tip of the cholangiocatheter being on a valve the cholangiogram was not technically possible and abandoned  Confident in the anatomy, the cystic duct divided between clips and the surgery completed laparoscopically    RUQ quadrant irrigated and aspirated at the procedures conclusion, good hemostasis found and the procedure completed without incident  Complications:   None    Procedure and Technique:  The patient was taken to the operating room where they were properly identified, monitored and anesthetized  They received antibiotics perioperatively  Venodyne's were placed prior to the induction of anesthesia for DVT prophylaxis  The abdomen prepped and draped under sterile conditions using aseptic technique  Timeout performed  Skin incised at the umbilicus  Peritoneal cavity entered bluntly with a [place] clamp  11 mm trocar inserted and pneumoperitoneum established to 15 mmHg  4 quadrants of the abdomen and inspected laparoscopically and no additional pathology was identified  3 additional working ports placed  These were 5 mm ports in the epigastrium and right upper quadrant  The patient placed in reverse Trendelenburg, left side down  Gallbladder grasped at its dome retracted cephalad and to the right  Infundibulum grasped and retracted laterally  [place] defined and skeletonized  Cystic duct dissected out circumferentially  Cystic artery dissected out circumferentially  Cystic artery clipped below and divided above with harmonic pauline  A clip placed on the up side of the cystic duct  An nick made in the cystic duct and a cholangiocatheter advanced and secured  Fluoroscopic cholangiogram was performed with the above findings noted  Cholangiocatheter removed  Cystic duct clipped twice on the down side and divided between clips  Gallbladder dissected off the liver with harmonic pauline  The gallbladder was placed in an Endobag and delivered through the umbilical trocar site  Pneumoperitoneum reestablished to 15 mmHg  Scope advanced and the right upper quadrant inspected  Good hemostasis found  The procedure completed with removal of all ports  The fascial defect at the umbilicus closed with a figure-of-eight suture of 0 Vicryl   Skin closed with subcuticular 4-0 Monocryl suture  Wounds infiltrated with half percent Marcaine  The wounds dressed  The patient extubated and taken to recovery in stable condition         I was present for the entire procedure    Patient Disposition:  PACU     SIGNATURE: Jorge Arnett MD  DATE: December 23, 2017  TIME: 12:35 PM

## 2017-12-23 NOTE — DISCHARGE INSTRUCTIONS
Biliary Colic   WHAT YOU NEED TO KNOW:   Biliary colic is severe pain in your upper abdomen caused by a gallbladder problem  Your gallbladder stores bile, which helps break down the fats that you eat  DISCHARGE INSTRUCTIONS:   Medicines:   · Medicines  can help decrease pain and muscle spasms  You may also need medicine to calm your stomach and stop vomiting  · Take your medicine as directed  Contact your healthcare provider if you think your medicine is not helping or you have side effects  Tell him if you are allergic to any medicine  Keep a list of the medicines, vitamins, and herbs you take  Include the amounts, and when and why you take them  Bring the list or the pill bottles to follow-up visits  Carry your medicine list with you in case of an emergency  Avoid alcohol:  Alcohol can damage your gallbladder and make your symptoms worse  Maintain a healthy weight:  Ask your healthcare provider how much you should weigh  Ask him to help you create a weight loss plan if you are overweight  Eat a variety of healthy foods:  Healthy foods include fruits, vegetables, whole-grain breads, low-fat dairy products, beans, lean meats, and fish  Foods that are high in fiber and low in fat and cholesterol may decrease your symptoms  Ask if you need to be on a special diet  Exercise:  Ask your healthcare provider about the best exercise plan for you  Exercise may help improve your symptoms  Follow up with your healthcare provider as directed:  Bring a list of any questions you have so you remember to ask them during your visits  Contact your healthcare provider if:   · You have a fever  · Your pain gets worse, even after you take medicine  · You have nausea or are vomiting  · Your skin or eyes are yellow  · You have questions or concerns about your condition or care  Return to the emergency department if:   · You have severe pain  · You feel like you are going to faint      · You are short of breath  © 2017 [facility] Information is for End User's use only and may not be sold, redistributed or otherwise used for commercial purposes  All illustrations and images included in CareNotes® are the copyrighted property of A D A M [employer]  or Juan Schaefer  The above information is an  only  It is not intended as medical advice for individual conditions or treatments  Talk to your doctor, nurse or pharmacist before following any medical regimen to see if it is safe and effective for you

## 2017-12-23 NOTE — PROGRESS NOTES
[facility] Progress Note  Patient: Jose Roberto Sampson 60 y o  male   MRN: [id number]  PCP: Lynn Rivera DO  Unit/Bed#: [facility] 2 -01 Encounter: [id number]  Date Of Visit: 12/23/17      Assessment/plan  Principal problem  1  Acute calculus cholecystitis- s/p lap ge per surgery  He was discharged by surgery to home  Active problems  1  Hemolytic anemia- h/h stable    2  History of portal vein thrombosis, DVT of the leg, and pulmonary embolism May 2017- restart xarelto    dispo- pt discharged to home per surgery  He is okay to be discharged  Subjective:   Pt seen and examined  Pt doing well  No problems today  No f/c no cp no sob no n/v/d  abd pain is controlled  Objective:     Vitals: Blood pressure 164/93, pulse 76, temperature 98 4 °F (36 9 °C), temperature source Tympanic, resp  rate 14, weight 86 2 kg (190 lb), SpO2 99 %  ,Body mass index is 28 06 kg/m²  Lab, Imaging and other studies:    Results from last 7 days  Lab Units 12/22/17  0511   WBC Thousand/uL 10 89*   HEMOGLOBIN g/dL 9 9*   HEMATOCRIT % 27 8*   PLATELETS Thousands/uL 372       Results from last 7 days  Lab Units 12/22/17  0511   SODIUM mmol/L 142   POTASSIUM mmol/L 3 6   CHLORIDE mmol/L 106   CO2 mmol/L 30   BUN mg/dL 12   CREATININE mg/dL 0 76   CALCIUM mg/dL 8 4   TOTAL PROTEIN g/dL 5 3*   BILIRUBIN TOTAL mg/dL 2 95*   ALK PHOS U/L 54   ALT U/L 53   AST U/L 32   GLUCOSE RANDOM mg/dL 94         Lab Results   Component Value Date    URINECX No Growth <1000 cfu/mL 11/02/2016         Ct Bone Marrow Biopsy And Aspiration    Result Date: 12/19/2017  Narrative: IMAGE-GUIDED BONE MARROW BIOPSY Indication: Hemolytic anemia Procedure and Findings: The procedure was performed in the prone position  1% lidocaine was used for local anesthetic and conscious sedation was administered  The right posterior inferior iliac spine was localized by CT and the low back was prepped and draped in sterile fashion   Using CT guidance, 13 gauge TRAP needle was advanced through the cortex of the iliac spine into the marrow  Aspiration was performed and submitted to pathology for slide preparation  Diagnostic marrow cells were identified preliminarily  The needle was slightly withdrawn and redirected to obtain a core biopsy  The core was submitted to pathology  The puncture site was cleansed and a dressing was applied  This examination, like all CT scans performed in the [facility], was performed utilizing techniques to minimize radiation dose exposure, including the use of iterative reconstruction and automated exposure control  Sedation time: 30 minutes     Impression: Impression: Technically successful image guided bone marrow aspiration and core biopsy Workstation performed: [id number]     Us Right Upper Quadrant    Addendum Date: 12/21/2017 Addendum:   Addendum: Please note the left portal vein is difficult to evaluate due to shadowing bowel gas  The middle and right portal veins appear unremarkable with normal hepatopedal flow  Result Date: 12/21/2017  Narrative: RIGHT UPPER QUADRANT ULTRASOUND INDICATION:  Right upper quadrant pain  Prior cholecystitis  COMPARISON:  CT dated 12/21/2017 TECHNIQUE:   Real-time ultrasound of the right upper quadrant was performed with a curvilinear transducer with both volumetric sweeps and still imaging techniques  FINDINGS: PANCREAS:  Visualized portions of the pancreas are within normal limits  AORTA AND IVC:  Visualized portions are normal for patient age  LIVER: Size:  Mildly enlarged  The liver measures 18 7 cm in the midclavicular line  Contour:  Surface contour is smooth  Parenchyma: There is moderate diffuse increased echogenicity with smooth echotexture and acoustic beam attenuation  Most consistent with moderate hepatic steatosis  Fatty sparing noted left hepatic lobe  No evidence of suspicious mass   Limited imaging of the main portal vein shows it to be patent and hepatopetal   BILIARY: The gallbladder is distended  Gallbladder wall thickening noted with edematous appearance measuring up to 11 mm without significant pericholecystic fluid  Dependent stones and sludge identified within the gallbladder No sonographic Salas's sign  No intrahepatic biliary dilatation  CBD measures 6 mm  No choledocholithiasis  KIDNEY: Right kidney measures 11 5 cm  Within normal limits  ASCITES:   None  Impression: 1  Dependent gallbladder sludge and stones with gallbladder wall thickening but no pericholecystic fluid  Sonographic Salas's sign is negative  Again recommend clinical correlation for acute cholecystitis versus gallbladder wall thickening related to  systemic disease, similar to recent CT  No CBD dilatation or choledocholithiasis  2   Fatty liver with left hepatic lobe fatty sparing  Workstation performed: [id number]     Ct Abdomen Pelvis With Contrast    Result Date: 12/21/2017  Narrative: CT ABDOMEN AND PELVIS WITH IV CONTRAST INDICATION:  RIGHT upper quadrant pain  History of hemolytic anemia  Jaundice  COMPARISON: June 6, 2017 and 11/6/2017 TECHNIQUE:  CT examination of the abdomen and pelvis was performed  Reformatted images were created in axial, sagittal, and coronal planes  Radiation dose length product (DLP) for this visit:  654 mGy-cm   This examination, like all CT scans performed in the [facility], was performed utilizing techniques to minimize radiation dose exposure, including the use of iterative reconstruction and automated exposure control  IV Contrast:  100 mL of iohexol (OMNIPAQUE)         Enteric Contrast:  Enteric contrast was not administered  FINDINGS: ABDOMEN LOWER CHEST:  No significant abnormalities identified in the lower chest  LIVER/BILIARY TREE:  There is a geographic territorial region of asymmetric perfusion of the liver    Specifically, portions of the LEFT medial and lateral segments are relatively hyperdense compared to the remainder of the liver  Although considerably less apparent on this exam, this appears to be secondary to thrombosis within the LEFT portal vein  On comparison to June, the prior portal venous thrombus is significantly reduced  No filling defects remaining within the main portal vein or RIGHT portal vein branches  GALLBLADDER:  Gallbladder is moderately distended and contains a few calcified calculi  Minimal haziness at the margins of the gallbladder and mild asymmetric wall thickening along the hepatic margin of the gallbladder  Correlate with clinical findings  for evidence of acute cholecystitis versus secondary changes of the gallbladder related to systemic etiology  SPLEEN:  Prior splenectomy  PANCREAS:  Unremarkable  ADRENAL GLANDS:  Unremarkable  KIDNEYS/URETERS:  Unremarkable  No hydronephrosis  STOMACH AND BOWEL:  There is colonic diverticulosis without evidence of acute diverticulitis  APPENDIX:  A normal appendix was visualized  ABDOMINOPELVIC CAVITY:  No ascites or free intraperitoneal air  No lymphadenopathy  VESSELS:  Unremarkable for patient's age  PELVIS REPRODUCTIVE ORGANS:  Unremarkable for patient's age  URINARY BLADDER:  Unremarkable  ABDOMINAL WALL/INGUINAL REGIONS:  Unremarkable  OSSEOUS STRUCTURES:  No acute fracture or destructive osseous lesion  Impression: Gallbladder is distended and shows findings suggesting mild wall thickening or adjacent fluid  Several calcified stones present  No significant pericholecystic inflammation  Correlate with clinical findings for acute cholecystitis versus wall thickening related to systemic etiology Asymmetric radiographic alteration in the liver perfusion pattern as detailed above, most consistent with alteration in flow related to segmental portal vein thrombus  Overall visible portal venous thrombus significantly improved compared to June  Diverticulosis without evidence for diverticulitis  Prior splenectomy   Workstation performed: [id number]       Scheduled Meds:   cefazolin 1,000 mg Intravenous Q8H   nicotine 1 patch Transdermal Daily   pantoprazole 40 mg Intravenous Q12H [address]   polyethylene glycol 17 g Oral Daily   predniSONE 30 mg Oral Daily     Continuous Infusions:   sodium chloride 0 9 % with KCl 20 mEq/L 50 mL/hr Last Rate: 50 mL/hr (12/22/17 1219)     PRN Meds:   acetaminophen    hydrALAZINE    morphine injection    ondansetron    oxyCODONE-acetaminophen      Physical exam:  Physical Exam   Constitutional: He is oriented to person, place, and time  He appears well-developed and well-nourished  HENT:   Head: Normocephalic and atraumatic  Eyes: EOM are normal  Pupils are equal, round, and reactive to light  Neck: Normal range of motion  Neck supple  Cardiovascular: Normal rate, regular rhythm and normal heart sounds  Exam reveals no gallop and no friction rub  No murmur heard  Pulmonary/Chest: Effort normal and breath sounds normal  No respiratory distress  He has no wheezes  He has no rales  Abdominal: Soft  Bowel sounds are normal  He exhibits no distension  ttp that is appropriate   Musculoskeletal: Normal range of motion  Neurological: He is alert and oriented to person, place, and time  Skin: Skin is warm and dry

## 2017-12-23 NOTE — DISCHARGE SUMMARY
The patient was admitted with signs and symptoms of acute calculous cholecystitis  There is a relative was held for 48 hours during which time consultations were obtained with Gastroenterology and Hematology  An MRI was performed  On 10/23/2017 the patient had a laparoscopic cholecystectomy for the definitive treatment of the acute calculous cholecystitis and there discharged home same date

## 2017-12-27 ENCOUNTER — GENERIC CONVERSION - ENCOUNTER (OUTPATIENT)
Dept: OTHER | Facility: OTHER | Age: 62
End: 2017-12-27

## 2017-12-27 ENCOUNTER — APPOINTMENT (OUTPATIENT)
Dept: LAB | Facility: MEDICAL CENTER | Age: 62
End: 2017-12-27
Payer: COMMERCIAL

## 2017-12-27 DIAGNOSIS — D59.19 OTHER AUTOIMMUNE HEMOLYTIC ANEMIAS: ICD-10-CM

## 2017-12-27 LAB
ERYTHROCYTE [DISTWIDTH] IN BLOOD BY AUTOMATED COUNT: 16.3 % (ref 11.6–15.1)
HCT VFR BLD AUTO: 30 % (ref 36.5–49.3)
HGB BLD-MCNC: 9.6 G/DL (ref 12–17)
MCH RBC QN AUTO: 38.9 PG (ref 26.8–34.3)
MCHC RBC AUTO-ENTMCNC: 32 G/DL (ref 31.4–37.4)
MCV RBC AUTO: 122 FL (ref 82–98)
PLATELET # BLD AUTO: 581 THOUSANDS/UL (ref 149–390)
PMV BLD AUTO: 10.1 FL (ref 8.9–12.7)
RBC # BLD AUTO: 2.47 MILLION/UL (ref 3.88–5.62)
WBC # BLD AUTO: 16.7 THOUSAND/UL (ref 4.31–10.16)

## 2017-12-27 PROCEDURE — 85027 COMPLETE CBC AUTOMATED: CPT

## 2017-12-27 PROCEDURE — 36415 COLL VENOUS BLD VENIPUNCTURE: CPT

## 2018-01-02 ENCOUNTER — LAB (OUTPATIENT)
Dept: LAB | Facility: MEDICAL CENTER | Age: 63
End: 2018-01-02
Payer: COMMERCIAL

## 2018-01-02 DIAGNOSIS — D59.19 OTHER AUTOIMMUNE HEMOLYTIC ANEMIAS: ICD-10-CM

## 2018-01-02 LAB
ERYTHROCYTE [DISTWIDTH] IN BLOOD BY AUTOMATED COUNT: 21.1 % (ref 11.6–15.1)
HCT VFR BLD AUTO: 27.5 % (ref 36.5–49.3)
HGB BLD-MCNC: 8.5 G/DL (ref 12–17)
MCH RBC QN AUTO: 42.1 PG (ref 26.8–34.3)
MCHC RBC AUTO-ENTMCNC: 30.9 G/DL (ref 31.4–37.4)
MCV RBC AUTO: 136 FL (ref 82–98)
PLATELET # BLD AUTO: 642 THOUSANDS/UL (ref 149–390)
PMV BLD AUTO: 9.8 FL (ref 8.9–12.7)
RBC # BLD AUTO: 2.02 MILLION/UL (ref 3.88–5.62)
WBC # BLD AUTO: 14.08 THOUSAND/UL (ref 4.31–10.16)

## 2018-01-02 PROCEDURE — 36415 COLL VENOUS BLD VENIPUNCTURE: CPT

## 2018-01-02 PROCEDURE — 85027 COMPLETE CBC AUTOMATED: CPT

## 2018-01-04 ENCOUNTER — ALLSCRIPTS OFFICE VISIT (OUTPATIENT)
Dept: OTHER | Facility: OTHER | Age: 63
End: 2018-01-04

## 2018-01-04 ENCOUNTER — APPOINTMENT (OUTPATIENT)
Dept: LAB | Facility: HOSPITAL | Age: 63
End: 2018-01-04
Attending: INTERNAL MEDICINE
Payer: COMMERCIAL

## 2018-01-04 ENCOUNTER — TRANSCRIBE ORDERS (OUTPATIENT)
Dept: LAB | Facility: HOSPITAL | Age: 63
End: 2018-01-04

## 2018-01-04 DIAGNOSIS — D59.19 OTHER AUTOIMMUNE HEMOLYTIC ANEMIAS: ICD-10-CM

## 2018-01-04 DIAGNOSIS — K83.1 CHRONIC CHOLESTATIC JAUNDICE SYNDROME: Primary | ICD-10-CM

## 2018-01-04 DIAGNOSIS — K83.1 CHRONIC CHOLESTATIC JAUNDICE SYNDROME: ICD-10-CM

## 2018-01-04 LAB
ABO GROUP BLD: NORMAL
ALBUMIN SERPL BCP-MCNC: 3.8 G/DL (ref 3.5–5)
ALP SERPL-CCNC: 140 U/L (ref 46–116)
ALT SERPL W P-5'-P-CCNC: 368 U/L (ref 12–78)
ANION GAP SERPL CALCULATED.3IONS-SCNC: 7 MMOL/L (ref 4–13)
ANISOCYTOSIS BLD QL SMEAR: PRESENT
AST SERPL W P-5'-P-CCNC: 98 U/L (ref 5–45)
BASOPHILS # BLD MANUAL: 0 THOUSAND/UL (ref 0–0.1)
BASOPHILS NFR MAR MANUAL: 0 % (ref 0–1)
BILIRUB DIRECT SERPL-MCNC: 1.43 MG/DL (ref 0–0.2)
BILIRUB SERPL-MCNC: 3.6 MG/DL (ref 0.2–1)
BLD GP AB SCN SERPL QL: POSITIVE
BUN SERPL-MCNC: 13 MG/DL (ref 5–25)
CALCIUM SERPL-MCNC: 8.7 MG/DL (ref 8.3–10.1)
CHLORIDE SERPL-SCNC: 106 MMOL/L (ref 100–108)
CO2 SERPL-SCNC: 27 MMOL/L (ref 21–32)
CREAT SERPL-MCNC: 0.96 MG/DL (ref 0.6–1.3)
EOSINOPHIL # BLD MANUAL: 0 THOUSAND/UL (ref 0–0.4)
EOSINOPHIL NFR BLD MANUAL: 0 % (ref 0–6)
ERYTHROCYTE [DISTWIDTH] IN BLOOD BY AUTOMATED COUNT: 20.4 % (ref 11.6–15.1)
GFR SERPL CREATININE-BSD FRML MDRD: 84 ML/MIN/1.73SQ M
GIANT PLATELETS BLD QL SMEAR: PRESENT
GLUCOSE P FAST SERPL-MCNC: 124 MG/DL (ref 65–99)
HCT VFR BLD AUTO: 30.2 % (ref 36.5–49.3)
HGB BLD-MCNC: 9.6 G/DL (ref 12–17)
HOWELL-JOLLY BOD BLD QL SMEAR: PRESENT
LIPASE SERPL-CCNC: 223 U/L (ref 73–393)
LYMPHOCYTES # BLD AUTO: 0.69 THOUSAND/UL (ref 0.6–4.47)
LYMPHOCYTES # BLD AUTO: 4 % (ref 14–44)
MACROCYTES BLD QL AUTO: PRESENT
MCH RBC QN AUTO: 43 PG (ref 26.8–34.3)
MCHC RBC AUTO-ENTMCNC: 31.8 G/DL (ref 31.4–37.4)
MCV RBC AUTO: 135 FL (ref 82–98)
MONOCYTES # BLD AUTO: 2.08 THOUSAND/UL (ref 0–1.22)
MONOCYTES NFR BLD: 12 % (ref 4–12)
NEUTROPHILS # BLD MANUAL: 14.55 THOUSAND/UL (ref 1.85–7.62)
NEUTS BAND NFR BLD MANUAL: 1 % (ref 0–8)
NEUTS SEG NFR BLD AUTO: 83 % (ref 43–75)
NRBC BLD AUTO-RTO: 18 /100 WBC (ref 0–2)
NRBC BLD AUTO-RTO: 18 /100 WBCS
PAPPENHEIMER BOD BLD QL SMEAR: PRESENT
PLATELET # BLD AUTO: 587 THOUSANDS/UL (ref 149–390)
PLATELET BLD QL SMEAR: ABNORMAL
PMV BLD AUTO: 9.4 FL (ref 8.9–12.7)
POIKILOCYTOSIS BLD QL SMEAR: PRESENT
POLYCHROMASIA BLD QL SMEAR: PRESENT
POTASSIUM SERPL-SCNC: 4.4 MMOL/L (ref 3.5–5.3)
PROT SERPL-MCNC: 6.8 G/DL (ref 6.4–8.2)
RBC # BLD AUTO: 2.23 MILLION/UL (ref 3.88–5.62)
RBC MORPH BLD: PRESENT
RH BLD: POSITIVE
SODIUM SERPL-SCNC: 140 MMOL/L (ref 136–145)
SPECIMEN EXPIRATION DATE: NORMAL
WBC # BLD AUTO: 17.32 THOUSAND/UL (ref 4.31–10.16)

## 2018-01-04 PROCEDURE — 80053 COMPREHEN METABOLIC PANEL: CPT

## 2018-01-04 PROCEDURE — 85007 BL SMEAR W/DIFF WBC COUNT: CPT

## 2018-01-04 PROCEDURE — 83690 ASSAY OF LIPASE: CPT

## 2018-01-04 PROCEDURE — 36415 COLL VENOUS BLD VENIPUNCTURE: CPT

## 2018-01-04 PROCEDURE — 82248 BILIRUBIN DIRECT: CPT

## 2018-01-04 PROCEDURE — 86850 RBC ANTIBODY SCREEN: CPT

## 2018-01-04 PROCEDURE — 85027 COMPLETE CBC AUTOMATED: CPT

## 2018-01-04 PROCEDURE — 86901 BLOOD TYPING SEROLOGIC RH(D): CPT

## 2018-01-04 PROCEDURE — 86900 BLOOD TYPING SEROLOGIC ABO: CPT

## 2018-01-04 RX ORDER — SODIUM CHLORIDE 9 MG/ML
20 INJECTION, SOLUTION INTRAVENOUS ONCE
Status: COMPLETED | OUTPATIENT
Start: 2018-01-06 | End: 2018-01-06

## 2018-01-06 ENCOUNTER — HOSPITAL ENCOUNTER (OUTPATIENT)
Dept: INFUSION CENTER | Facility: HOSPITAL | Age: 63
Discharge: HOME | End: 2018-01-08
Payer: COMMERCIAL

## 2018-01-06 VITALS
DIASTOLIC BLOOD PRESSURE: 77 MMHG | SYSTOLIC BLOOD PRESSURE: 140 MMHG | TEMPERATURE: 97.9 F | HEART RATE: 69 BPM | RESPIRATION RATE: 20 BRPM

## 2018-01-06 PROCEDURE — 36430 TRANSFUSION BLD/BLD COMPNT: CPT

## 2018-01-06 PROCEDURE — 86922 COMPATIBILITY TEST ANTIGLOB: CPT

## 2018-01-06 PROCEDURE — 86921 COMPATIBILITY TEST INCUBATE: CPT

## 2018-01-06 PROCEDURE — P9016 RBC LEUKOCYTES REDUCED: HCPCS

## 2018-01-06 RX ADMIN — SODIUM CHLORIDE 20 ML/HR: 0.9 INJECTION, SOLUTION INTRAVENOUS at 08:15

## 2018-01-06 NOTE — PLAN OF CARE
Problem: Potential for Falls  Goal: Patient will remain free of falls  INTERVENTIONS:  - Assess patient frequently for physical needs  -  Identify cognitive and physical deficits and behaviors that affect risk of falls    -  [place] fall precautions as indicated by assessment   - Educate patient/family on patient safety including physical limitations  - Instruct patient to call for assistance with activity based on assessment  - Modify environment to reduce risk of injury  - Consider OT/PT consult to assist with strengthening/mobility   Outcome: Progressing

## 2018-01-09 NOTE — MISCELLANEOUS
Message   Recorded as Task   Date: 02/08/2017 09:20 AM, Created By: Millie Shaw   Task Name: Call Back   Assigned To: Patrica Kebede   Regarding Patient: Perla Goldstein, Status: Active   Bailey Sky - 08 Feb 2017 9:20 AM     TASK CREATED  Patient called was checking on CBC done Monday 2/6/17  Result are in [employer] and result of HGB given to pt  Also wanted you to know was discharged from [place] [address]  Sunday 2/5   Spoke with patient today - labs reviewed  Prednisone will be tapered to 40mg PO daily from 50 - patient will have CBC repeated in one week and call for results - Rituxan on hold at this time  Patient wishes to see Dr Juany Otto first to discuss this treatment before he starts  Will see Dr Juany Otto on 2/21/17      Active Problems    1  Acute bronchitis (466 0) (J20 9)   2  Acute pharyngitis (462) (J02 9)   3  Acute sinusitis (461 9) (J01 90)   4  Dyspnea on exertion (786 09) (R06 09)   5  Encounter for pre-employment examination (V70 5) (Z02 1)   6  Hemolytic anemia (283 9) (D58 9)   7  History of allergy (V15 09) (Z88 9)   8  Jaundice (782 4) (R17)    Current Meds   1  Aspirin 81 MG CAPS; Therapy: (Recorded:92Gwm6296) to Recorded   2  One Daily Mens TABS; Therapy: (Recorded:54Wmj7285) to Recorded   3  PredniSONE 10 MG Oral Tablet; 5 tablets by mouth Daily or as directed; Therapy: 77GVL8301 to (Last Rx:77Ozw1488)  Requested for: 13Kwf4355 Ordered    Allergies    1   No Known Drug Allergies    Signatures   Electronically signed by : Rubio Ch, ; Feb 8 2017 11:04AM EST                       (Author)

## 2018-01-10 ENCOUNTER — LAB (OUTPATIENT)
Dept: LAB | Facility: MEDICAL CENTER | Age: 63
End: 2018-01-10
Payer: COMMERCIAL

## 2018-01-10 ENCOUNTER — APPOINTMENT (OUTPATIENT)
Dept: LAB | Facility: MEDICAL CENTER | Age: 63
End: 2018-01-10
Payer: COMMERCIAL

## 2018-01-10 DIAGNOSIS — D59.19 OTHER AUTOIMMUNE HEMOLYTIC ANEMIAS: ICD-10-CM

## 2018-01-10 LAB
ALBUMIN SERPL BCP-MCNC: 4.1 G/DL (ref 3.5–5)
ALP SERPL-CCNC: 87 U/L (ref 46–116)
ALT SERPL W P-5'-P-CCNC: 95 U/L (ref 12–78)
ANION GAP SERPL CALCULATED.3IONS-SCNC: 10 MMOL/L (ref 4–13)
AST SERPL W P-5'-P-CCNC: 20 U/L (ref 5–45)
BILIRUB SERPL-MCNC: 2.73 MG/DL (ref 0.2–1)
BUN SERPL-MCNC: 18 MG/DL (ref 5–25)
CALCIUM SERPL-MCNC: 9.6 MG/DL (ref 8.3–10.1)
CHLORIDE SERPL-SCNC: 104 MMOL/L (ref 100–108)
CO2 SERPL-SCNC: 28 MMOL/L (ref 21–32)
CREAT SERPL-MCNC: 0.96 MG/DL (ref 0.6–1.3)
ERYTHROCYTE [DISTWIDTH] IN BLOOD BY AUTOMATED COUNT: 20.8 % (ref 11.6–15.1)
GFR SERPL CREATININE-BSD FRML MDRD: 84 ML/MIN/1.73SQ M
GLUCOSE P FAST SERPL-MCNC: 141 MG/DL (ref 65–99)
HCT VFR BLD AUTO: 40.3 % (ref 36.5–49.3)
HGB BLD-MCNC: 12.7 G/DL (ref 12–17)
MCH RBC QN AUTO: 37.2 PG (ref 26.8–34.3)
MCHC RBC AUTO-ENTMCNC: 31.5 G/DL (ref 31.4–37.4)
MCV RBC AUTO: 118 FL (ref 82–98)
PLATELET # BLD AUTO: 578 THOUSANDS/UL (ref 149–390)
PMV BLD AUTO: 10 FL (ref 8.9–12.7)
POTASSIUM SERPL-SCNC: 3.5 MMOL/L (ref 3.5–5.3)
PROT SERPL-MCNC: 6.9 G/DL (ref 6.4–8.2)
RBC # BLD AUTO: 3.41 MILLION/UL (ref 3.88–5.62)
SODIUM SERPL-SCNC: 142 MMOL/L (ref 136–145)
WBC # BLD AUTO: 7.13 THOUSAND/UL (ref 4.31–10.16)

## 2018-01-10 PROCEDURE — 85027 COMPLETE CBC AUTOMATED: CPT

## 2018-01-10 PROCEDURE — 36415 COLL VENOUS BLD VENIPUNCTURE: CPT

## 2018-01-10 PROCEDURE — 80053 COMPREHEN METABOLIC PANEL: CPT

## 2018-01-10 NOTE — MISCELLANEOUS
Message   Recorded as Task   Date: 04/24/2017 09:09 AM, Created By: Joseluis Payton   Task Name: Call Back   Assigned To: Patrica Kebede   Regarding Patient: Collins Infante, Status: Active   Comment:    Xochitl Guadalupe - 24 Apr 2017 9:09 AM     TASK CREATED  pt called with questions on vaccines    patient will have his vaccines at PCP - they will contact our office with any questions or concerns      Active Problems    1  Acute bronchitis (466 0) (J20 9)   2  Acute pharyngitis (462) (J02 9)   3  Acute sinusitis (461 9) (J01 90)   4  Dyspnea on exertion (786 09) (R06 09)   5  Encounter for pre-employment examination (V70 5) (Z02 1)   6  Hemolytic anemia (283 9) (D58 9)   7  History of allergy (V15 09) (Z88 9)   8  Jaundice (782 4) (R17)   9  Strain of lumbar region, initial encounter (847 2) (S39 012A)    Current Meds   1  Aspirin 81 MG CAPS; Therapy: (Recorded:54Xat0107) to Recorded   2  Ergocalciferol 66540 UNIT CAPS; Therapy: (Recorded:52Vfo4060) to Recorded   3  Ferrous Sulfate 325 (65 Fe) MG Oral Tablet; Therapy: (Recorded:80Tbz1443) to Recorded   4  One Daily Mens TABS; Therapy: (Recorded:22Drn3169) to Recorded   5  Pantoprazole Sodium 40 MG Oral Tablet Delayed Release; Therapy: (Recorded:14Ldg2277) to Recorded   6  PredniSONE 10 MG Oral Tablet; 1 TAB 3 TIMES DAILY; Therapy: (Recorded:43Ood5571) to Recorded   7  Vitamin B-12 1000 MCG Oral Tablet; Therapy: (Recorded:30Qmg9070) to Recorded    Allergies    1   No Known Drug Allergies    Signatures   Electronically signed by : Dalton Walsh, ; Apr 24 2017 10:30AM EST                       (Author)

## 2018-01-10 NOTE — MISCELLANEOUS
Message   Recorded as Task   Date: 09/25/2017 01:28 PM, Created By: Gene Barnard   Task Name: Call Back   Assigned To: Patrica Kebede   Regarding Patient: Perla Goldstein, Status: Active   Comment:    DMKONSTANTIN,RJTV - 25 Sep 2017 1:28 PM     TASK CREATED  Caller: Self; Results Inquiry; [phone] (Home)  PT called requesting a call back with results of labs done today at [facility]  Also he states he has questions he would like to discuss regarding his tx  Hgb = 10 4  Patient will continue on PO prednisone - Rituxan to start next week  Patient will be going for a 2nd opinion at [facility] [address]    1  Acute bronchitis (466 0) (J20 9)   2  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)   3  Bilateral calf pain (729 5) (M79 661,M79 662)   4  Calf pain (729 5) (M79 669)   5  History of allergy (V15 09) (Z88 9)   6  Pulmonary embolism (415 19) (I26 99)   7  Shortness of breath (786 05) (R06 02)   8  Splenomegaly (789 2) (R16 1)   9  Strain of lumbar region, initial encounter (847 2) (S39 012A)    Current Meds   1  Aspirin 81 MG CAPS; Therapy: (Recorded:55Mlg8324) to Recorded   2  Ergocalciferol 56892 UNIT CAPS; Therapy: (Recorded:33Glq3807) to Recorded   3  Ferrous Sulfate 325 (65 Fe) MG Oral Tablet; Therapy: (Recorded:71Dkp8192) to Recorded   4  One Daily Mens TABS; Therapy: (Recorded:76Cay6208) to Recorded   5  Pantoprazole Sodium 40 MG Oral Tablet Delayed Release; Therapy: (Recorded:29Gyr7312) to Recorded   6  PredniSONE 20 MG Oral Tablet; TAKE 1 TABLET DAILY; Therapy: (Zaire Herbert) to Recorded   7  Vitamin B-12 1000 MCG Oral Tablet; Therapy: (Recorded:85Whr7226) to Recorded    Allergies    1   No Known Drug Allergies    Signatures   Electronically signed by : Rubio Ch, ; Sep 25 2017  2:04PM EST                       (Author)

## 2018-01-10 NOTE — MISCELLANEOUS
Message   Recorded as Task   Date: 03/15/2017 10:39 AM, Created By: Kenneth De La Paz   Task Name: Call Back   Assigned To: Patrica Kebede   Regarding Patient: Shayna Mccartney, Status: Active   Comment:    Deanna Soteloy - 15 Mar 2017 10:39 AM     TASK CREATED  Caller: Self; Results Inquiry; [phone] (Home)  Pt calling for blood test results from Monday  He understands we are busy and doesn't expect a call back today  labs reviewed - prednisone decreased to 5mg PO QOD - CBC will be checked next Friday when he goes for infusion      Active Problems    1  Acute bronchitis (466 0) (J20 9)   2  Acute pharyngitis (462) (J02 9)   3  Acute sinusitis (461 9) (J01 90)   4  Dyspnea on exertion (786 09) (R06 09)   5  Encounter for pre-employment examination (V70 5) (Z02 1)   6  Hemolytic anemia (283 9) (D58 9)   7  History of allergy (V15 09) (Z88 9)   8  Jaundice (782 4) (R17)   9  Strain of lumbar region, initial encounter (847 2) (S39 012A)    Current Meds   1  Aspirin 81 MG CAPS; Therapy: (Recorded:02Nov2016) to Recorded   2  Cyclobenzaprine HCl - 10 MG Oral Tablet; TAKE 1 TABLET 3 TIMES DAILY AS NEEDED; Therapy: 06ZLL5849 to (Complete:16Mar2017)  Requested for: 85KUF2182; Last   Rx:06Mar2017 Ordered   3  Ergocalciferol 24376 UNIT CAPS; Therapy: (Recorded:16Gri6897) to Recorded   4  Ferrous Sulfate 325 (65 Fe) MG Oral Tablet; Therapy: (Recorded:07Sau1886) to Recorded   5  One Daily Mens TABS; Therapy: (Recorded:73Lgw0928) to Recorded   6  Pantoprazole Sodium 40 MG Oral Tablet Delayed Release; Therapy: (Recorded:53Qvg3503) to Recorded   7  PredniSONE 10 MG Oral Tablet; 5 tablets by mouth Daily or as directed; Therapy: 50RDA7281 to (Last Rx:99Wqj9671)  Requested for: 03Feb2017 Ordered   8  Vitamin B-12 1000 MCG Oral Tablet; Therapy: (Recorded:87Cmi7555) to Recorded    Allergies    1   No Known Drug Allergies    Signatures   Electronically signed by : Elvis Gould, ; Mar 15 2017 10:53AM EST (Author)

## 2018-01-10 NOTE — MISCELLANEOUS
Message   Recorded as Task   Date: 02/14/2017 09:22 AM, Created By: Norman Camacho   Task Name: Call Back   Assigned To: Patrica Kebede   Regarding Patient: Jessenia Shields, Status: Active   Comment:    Norman Camacho - 14 Feb 2017 9:22 AM     TASK CREATED  Caller: Self; [phone] (Home)  Pt had b/w yesterday (in [employer]) would like a call back to let him know what to do? Spoke with patient - labs reviewed  Ok to decrease prednisone by 10mg  Follow up arranged for next week  Patient to have labs done prior to visit      Active Problems    1  Acute bronchitis (466 0) (J20 9)   2  Acute pharyngitis (462) (J02 9)   3  Acute sinusitis (461 9) (J01 90)   4  Dyspnea on exertion (786 09) (R06 09)   5  Encounter for pre-employment examination (V70 5) (Z02 1)   6  Hemolytic anemia (283 9) (D58 9)   7  History of allergy (V15 09) (Z88 9)   8  Jaundice (782 4) (R17)    Current Meds   1  Aspirin 81 MG CAPS; Therapy: (Recorded:71Crl7973) to Recorded   2  One Daily Mens TABS; Therapy: (Recorded:24Cjm5546) to Recorded   3  PredniSONE 10 MG Oral Tablet; 5 tablets by mouth Daily or as directed; Therapy: 27PET0985 to (Last Rx:62Ycb1265)  Requested for: 75Ylm2159 Ordered    Allergies    1   No Known Drug Allergies    Signatures   Electronically signed by : Sixto Palmer, ; Feb 14 2017  9:52AM EST                       (Author)

## 2018-01-10 NOTE — MISCELLANEOUS
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)   2  Calf pain (729 5) (M79 669)   3  History of allergy (V15 09) (Z88 9)   4  Pulmonary embolism (415 19) (I26 99)   5  Shortness of breath (786 05) (R06 02)   6  Splenomegaly (789 2) (R16 1)   7  Strain of lumbar region, initial encounter (847 2) (S39 012A)    Current Meds   1  Aspirin 81 MG CAPS; Therapy: (Recorded:02Nov2016) to Recorded   2  Ergocalciferol 04750 UNIT CAPS; Therapy: (Recorded:13Wwc3768) to Recorded   3  Ferrous Sulfate 325 (65 Fe) MG Oral Tablet; Therapy: (Recorded:29Syc4109) to Recorded   4  One Daily Mens TABS; Therapy: (Recorded:78Ztk9785) to Recorded   5  Pantoprazole Sodium 40 MG Oral Tablet Delayed Release; Therapy: (Recorded:75Vwe6761) to Recorded   6  PredniSONE 10 MG Oral Tablet; TAKE 1 TABLET DAILY AS DIRECTED; Therapy: 18UIL2509 to (Evaluate:12Nov2017)  Requested for: 03Wrh6468; Last   Rx:46Tql7434; Status: ACTIVE - Retrospective By Protocol Authorization Ordered   7  PredniSONE 5 MG Oral Tablet; 1 tablet daily; Therapy: (Recorded:62Shi6218) to Recorded   8  Vitamin B-12 1000 MCG Oral Tablet; Therapy: (Recorded:68Yjw0082) to Recorded   9  Xarelto 10 MG Oral Tablet; One tablet po daily; Therapy: (Recorded:72Hoc1595) to Recorded   10  Xarelto 20 MG Oral Tablet; take 1 tablet every day;     Therapy: 69RIM5805 to ([place])  Requested for: 05HOY9437; Last    Rx:23Ijx8069; Status: ACTIVE - Retrospective By Protocol Authorization Ordered    Allergies    1  No Known Drug Allergies    Plan  Autoimmune hemolytic anemia    · (1) CBC/PLT/DIFF; Status:Active - Retrospective By Protocol Authorization; Requested  for:54Gzs1558;    · (1) LD (LDH); Status:Active - Retrospective By Protocol Authorization; Requested  for:61Byt4195;    · (1) TYPE & SCREEN; Status:Active - Retrospective By Protocol Authorization; Requested  for:35Swy4747;   currently receiving a biologic? : No  the patient been transfused in the last 3 months? : Yes  or PreOp : Medical   · (Q) DIRECT ANTIGLOBULIN W/REFL ANTI C3,ANTI IGG; Status:Active - Retrospective  By Protocol Authorization;  Requested for:59Nhg7271;     Signatures   Electronically signed by : Peggy Mesa, ; Aug 25 2017  2:06PM EST                       (Author)

## 2018-01-10 NOTE — MISCELLANEOUS
Message   Recorded as Task   Date: 04/13/2017 02:07 PM, Created By: Perioc Marie   Task Name: Call Back   Assigned To: Patrica Kebede   Regarding Patient: Ernst Junior, Status: Active   Comment:    Deanna Soteloy - 13 Apr 2017 2:07 PM     TASK CREATED  Caller: Self; Results Inquiry; [phone] (Home)  Calling for lab results  Please call back  Spoke with patient - prednisone to be increased to 30mg PO daily - patient will see Dr Stephenie Prakash next week for follow up      Active Problems    1  Acute bronchitis (466 0) (J20 9)   2  Acute pharyngitis (462) (J02 9)   3  Acute sinusitis (461 9) (J01 90)   4  Dyspnea on exertion (786 09) (R06 09)   5  Encounter for pre-employment examination (V70 5) (Z02 1)   6  Hemolytic anemia (283 9) (D58 9)   7  History of allergy (V15 09) (Z88 9)   8  Jaundice (782 4) (R17)   9  Strain of lumbar region, initial encounter (847 2) (S39 012A)    Current Meds   1  Aspirin 81 MG CAPS; Therapy: (Recorded:02Nov2016) to Recorded   2  Ergocalciferol 61783 UNIT CAPS; Therapy: (Recorded:02Pih1151) to Recorded   3  Ferrous Sulfate 325 (65 Fe) MG Oral Tablet; Therapy: (Recorded:75Plk8857) to Recorded   4  One Daily Mens TABS; Therapy: (Recorded:80Ivq0261) to Recorded   5  Pantoprazole Sodium 40 MG Oral Tablet Delayed Release; Therapy: (Recorded:39Wkn2552) to Recorded   6  PredniSONE 10 MG Oral Tablet; 5 tablets by mouth Daily or as directed; Therapy: 19JGF7165 to (Last Rx:00Fzd0106)  Requested for: 89Mcl4692 Ordered   7  Vitamin B-12 1000 MCG Oral Tablet; Therapy: (Recorded:17Ame9524) to Recorded    Allergies    1   No Known Drug Allergies    Plan  Hemolytic anemia    · PredniSONE 10 MG Oral Tablet; 5 tablets by mouth Daily or as directed    Signatures   Electronically signed by : Angel Grant, ; Apr 13 2017  3:04PM EST                       (Author)

## 2018-01-10 NOTE — MISCELLANEOUS
Message   Recorded as Task   Date: 2017 02:06 PM, Created By: Richmond Sesay   Task Name: Care Coordination   Assigned To: Patrica Kebede   Regarding Patient: Perla Goldstein, Status: Active   Comment:    Christina Mir - 18 Sep 2017 2:06 PM     TASK CREATED  Caller: Self; Care Coordination; [phone] (Home); [phone] (Work)  pt had labs done at [facility] today---checking results -- whether to continue treatment  call home #   Chey Childers - 18 Sep 2017 3:37 PM     TASK REPLIED TO: Previously Assigned To Chey Childers  He is not my patient  Ashley HallChristina flores - 19 Sep 2017 9:13 AM     TASK REASSIGNED: Previously Assigned To Christina Mir  sent to Dr Torey Cavazos in error---s/b [facility] with patient - He will continue on current dose of prednisone - 20mg PO daily and re check CBC in one week      Active Problems    1  Acute bronchitis (466 0) (J20 9)   2  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)   3  Bilateral calf pain (729 5) (M79 661,M79 662)   4  Calf pain (729 5) (M79 669)   5  History of allergy (V15 09) (Z88 9)   6  Pulmonary embolism (415 19) (I26 99)   7  Shortness of breath (786 05) (R06 02)   8  Splenomegaly (789 2) (R16 1)   9  Strain of lumbar region, initial encounter (847 2) (S39 012A)    Current Meds   1  Aspirin 81 MG CAPS; Therapy: (Recorded:96Qag7827) to Recorded   2  Ergocalciferol 17025 UNIT CAPS; Therapy: (Recorded:63Mhs9356) to Recorded   3  Ferrous Sulfate 325 (65 Fe) MG Oral Tablet; Therapy: (Recorded:62Gir7690) to Recorded   4  One Daily Mens TABS; Therapy: (Recorded:97Pan1805) to Recorded   5  Pantoprazole Sodium 40 MG Oral Tablet Delayed Release; Therapy: (Recorded:72Cbx9221) to Recorded   6  PredniSONE 20 MG Oral Tablet; TAKE 1 TABLET DAILY; Therapy: (Ariana Sharma) to Recorded   7  Vitamin B-12 1000 MCG Oral Tablet; Therapy: (Recorded:91Oeo4974) to Recorded    Allergies    1   No Known Drug Allergies    Signatures   Electronically signed by Elvis Gonsalez, ; Sep 19 2017  9:47AM EST                       (Author)

## 2018-01-10 NOTE — MISCELLANEOUS
Message   Recorded as Task   Date: 06/02/2017 10:19 AM, Created By: Jojo Huynh   Task Name: Call Back   Assigned To: Patrica Kebede   Regarding Patient: Daniel Lawler, Status: Active   Comment:    Lee Ann Charles - 02 Jun 2017 10:19 AM     TASK CREATED  Pt called in regards to blood work results and to speak on predisone dosage change     Labs reviewed with Dr Gregory Cushing - Prednisone increased to 20mg PO daily  Additional lab work ordered today - patient will go tomorrow to have drawn and call me on Monday for results  He is not symptomatic at this time form his anemia  If that changes he will call      Active Problems    1  Hemolytic anemia (283 9) (D58 9)   2  History of allergy (V15 09) (Z88 9)   3  Splenomegaly (789 2) (R16 1)   4  Strain of lumbar region, initial encounter (847 2) (S39 012A)    Current Meds   1  Aspirin 81 MG CAPS; Therapy: (Recorded:02Nov2016) to Recorded   2  Ergocalciferol 78661 UNIT CAPS; Therapy: (Recorded:66Zmy8234) to Recorded   3  Ferrous Sulfate 325 (65 Fe) MG Oral Tablet; Therapy: (Recorded:25Ihk7368) to Recorded   4  One Daily Mens TABS; Therapy: (Recorded:42Ken9847) to Recorded   5  Pantoprazole Sodium 40 MG Oral Tablet Delayed Release; Therapy: (Recorded:08Osw3330) to Recorded   6  PredniSONE 10 MG Oral Tablet; 1 TAB 3 TIMES DAILY; Therapy: (Recorded:26Cgm9374) to Recorded   7  Vitamin B-12 1000 MCG Oral Tablet; Therapy: (Recorded:11Yhl7423) to Recorded    Allergies    1   No Known Drug Allergies    Signatures   Electronically signed by : Rosita Hoffman, ; Jun 2 2017 11:50AM EST                       (Author)

## 2018-01-10 NOTE — MISCELLANEOUS
Message   Recorded as Task   Date: 10/23/2017 10:49 AM, Created By: Fabienne Carcamo   Task Name: Follow Up   Assigned To: Patrica Kebede   Regarding Patient: Landen Becker, Status: Active   CommentScar George - 23 Oct 2017 10:49 AM     TASK CREATED  Caller: Self; Other; [phone] (Home); [phone] (Work)  Patient wants to know if he should be changing the amount of Prednison 20mg that he takes daily and also he indicated that fax [phone] is where his medical records are suppose to be send  [phone]   Spoke with patient - labs reviewed with Dr Modesta Griffiths for patient to decrease prednisone from 20mg PO daily to 15mg PO daily  He will call me in one week for lab results and steroid instruction      Active Problems    1  Acute bronchitis (466 0) (J20 9)   2  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)   3  Bilateral calf pain (729 5) (M79 661,M79 662)   4  Calf pain (729 5) (M79 669)   5  History of allergy (V15 09) (Z88 9)   6  Pulmonary embolism (415 19) (I26 99)   7  Shortness of breath (786 05) (R06 02)   8  Splenomegaly (789 2) (R16 1)   9  Strain of lumbar region, initial encounter (847 2) (S39 012A)    Current Meds   1  Amoxicillin-Pot Clavulanate 875-125 MG Oral Tablet; TAKE 1 TABLET EVERY 12   HOURS DAILY; Therapy: 11LZP6823 to (03 17 74 30 53)  Requested for: 71JCZ0273; Last   Rx:17Oct2017 Ordered   2  Aspirin 81 MG CAPS; Therapy: (Recorded:02Nov2016) to Recorded   3  Ergocalciferol 39462 UNIT CAPS; Therapy: (Recorded:02Wle3761) to Recorded   4  Ferrous Sulfate 325 (65 Fe) MG Oral Tablet; Therapy: (Recorded:81Nmk8379) to Recorded   5  One Daily Mens TABS; Therapy: (Recorded:97Ney4385) to Recorded   6  Pantoprazole Sodium 40 MG Oral Tablet Delayed Release; Therapy: (Recorded:24Xwt3537) to Recorded   7  PredniSONE 20 MG Oral Tablet; TAKE 1 TABLET DAILY; Therapy: (Pearletha Bare) to Recorded   8  Vitamin B-12 1000 MCG Oral Tablet;    Therapy: (Recorded:22Rqw7470) to Recorded    Allergies    1   No Known Drug Allergies    Signatures   Electronically signed by : Precious Eugene, ; Oct 23 2017 11:04AM EST                       (Author)

## 2018-01-11 NOTE — MISCELLANEOUS
Message   Recorded as Task   Date: 07/05/2017 03:19 PM, Created By: Linda Guallpa   Task Name: Call Back   Assigned To: Patrica Kebede   Regarding Patient: Perla Goldstein, Status: Active   Comment:    Chastity Del Cid - 05 Jul 2017 3:19 PM     TASK CREATED  Caller: Self; Other; [phone] (Home)  PT HAD HAS BLOOD DRAWN THIS MORNING AT  [facility] - CALL FOR RSLTS AND TO GIVE YOU AND UPDATE OF HOW HE IS DOING  PLEASE CALL, TXS   Reviewed with patient that hgb = 12 6  Will decrease PO prednisone to 10mg PO daily - Re check in one week      Active Problems    1  Calf pain (729 5) (M79 669)   2  Hemolytic anemia (283 9) (D58 9)   3  History of allergy (V15 09) (Z88 9)   4  Pulmonary embolism (415 19) (I26 99)   5  Shortness of breath (786 05) (R06 02)   6  Splenomegaly (789 2) (R16 1)   7  Strain of lumbar region, initial encounter (847 2) (S39 012A)    Current Meds   1  Aspirin 81 MG CAPS; Therapy: (Recorded:02Nov2016) to Recorded   2  Ergocalciferol 29624 UNIT CAPS; Therapy: (Recorded:26Fjt1766) to Recorded   3  Ferrous Sulfate 325 (65 Fe) MG Oral Tablet; Therapy: (Recorded:75Jsv5420) to Recorded   4  One Daily Mens TABS; Therapy: (Recorded:75Ayr5710) to Recorded   5  Pantoprazole Sodium 40 MG Oral Tablet Delayed Release; Therapy: (Recorded:82Ubi7006) to Recorded   6  PredniSONE 20 MG Oral Tablet; 3 po daily  Take with food  taper as directed; Therapy: (Recorded:15Jun2017) to  Requested for: 11VFB4850 Recorded   7  Vitamin B-12 1000 MCG Oral Tablet; Therapy: (Recorded:91Eak7592) to Recorded   8  Xarelto 15 MG Oral Tablet; Take 1 tablet twice daily; Therapy: (Recorded:15Jun2017) to Recorded    Allergies    1   No Known Drug Allergies    Signatures   Electronically signed by : Rubio Ch, ; Jul 5 2017  3:37PM EST                       (Author)

## 2018-01-11 NOTE — MISCELLANEOUS
Message   Recorded as Task   Date: 09/11/2017 01:51 PM, Created By: Izzy Bardales   Task Name: Call Back   Assigned To: Patrica Kebede   Regarding Patient: Daniel Lawler, Status: Active   Comment:    Izzy Bardales - 11 Sep 2017 1:51 PM     TASK CREATED  Caller: Self; [phone] (Home)  Pt is calling for results from b/w this morning results are in Epic  Labs reviewed with patient - hgb = 9 2  He was instructed to increase his prednisone to 20mg PO daily  He reports increased fatigue and tingling in both of his calfs  Doppler arranged for tomorrow  Patient will begin on Xarelto due his history of PE  He will call me tomorrow for results  Patient will have CBC re checked prior to his visit with Dr Gregory Cushing on 9/14/17      Active Problems    1  Acute bronchitis (466 0) (J20 9)   2  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)   3  Bilateral calf pain (729 5) (M79 661,M79 662)   4  Calf pain (729 5) (M79 669)   5  History of allergy (V15 09) (Z88 9)   6  Pulmonary embolism (415 19) (I26 99)   7  Shortness of breath (786 05) (R06 02)   8  Splenomegaly (789 2) (R16 1)   9  Strain of lumbar region, initial encounter (847 2) (S39 012A)    Current Meds   1  Aspirin 81 MG CAPS; Therapy: (Recorded:02Nov2016) to Recorded   2  Ergocalciferol 20390 UNIT CAPS; Therapy: (Recorded:64Fsu9191) to Recorded   3  Ferrous Sulfate 325 (65 Fe) MG Oral Tablet; Therapy: (Recorded:76Yvx9641) to Recorded   4  One Daily Mens TABS; Therapy: (Recorded:08Fcs9994) to Recorded   5  Pantoprazole Sodium 40 MG Oral Tablet Delayed Release; Therapy: (Recorded:20Hdt6705) to Recorded   6  PredniSONE 20 MG Oral Tablet; TAKE 1 TABLET DAILY; Therapy: (Dyan Citizen) to Recorded   7  Vitamin B-12 1000 MCG Oral Tablet; Therapy: (Recorded:24Nob4078) to Recorded   8  Zithromax Z-Pablo 250 MG Oral Tablet (Azithromycin); TAKE 2 TABLETS ON DAY 1 THEN   TAKE 1 TABLET A DAY FOR 4 DAYS;    Therapy: 94Zvl9982 to (Last Rx:31Aug2017)  Requested for: 47DUM9255 Ordered    Allergies    1  No Known Drug Allergies    Plan  Bilateral calf pain    · VAS LOWER LIMB VENOUS DUPLEX STUDY, COMPLETE BILATERAL; Status:Hold For  - Scheduling,Retrospective By Protocol Authorization;  Requested for:12Mrk2436;     Signatures   Electronically signed by : Arsalan Light, ; Sep 11 2017  4:18PM EST                       (Author)

## 2018-01-11 NOTE — MISCELLANEOUS
Message   Recorded as Task   Date: 02/02/2017 03:45 PM, Created By: SANIYA GARDNER   Task Name: Call Back   Assigned To: Patrica Kebede   Regarding Patient: Mcdowell Na, Status: Active   Comment:    Jannet Dorsey - 02 Feb 2017 3:45 PM     TASK CREATED  Pt's wife called and stated the pt is looking jaundice  He has bloodwork done this morning ( results in epic) and wants to know how the blood work is  Call back#: [phone]   Labs reviewed with Dr Inga Hudson - Patient to begin Prednisone 50mg PO daily  Dr Inga Hudson will be speaking with patient regarding Rituxan - Will arrange for weekly x 4      Active Problems    1  Acute bronchitis (466 0) (J20 9)   2  Acute pharyngitis (462) (J02 9)   3  Acute sinusitis (461 9) (J01 90)   4  Dyspnea on exertion (786 09) (R06 09)   5  Encounter for pre-employment examination (V70 5) (Z02 1)   6  Hemolytic anemia (283 9) (D58 9)   7  History of allergy (V15 09) (Z88 9)   8  Jaundice (782 4) (R17)    Current Meds   1  Aspirin 81 MG CAPS; Therapy: (Recorded:02Nov2016) to Recorded   2  One Daily Mens TABS; Therapy: (Recorded:29Mjz0807) to Recorded   3  PredniSONE 10 MG Oral Tablet; 5 tablets by mouth Daily or as directed; Therapy: 07YUQ7792 to (Last Rx:54Llw3730)  Requested for: 79Orz5404; Status:   ACTIVE - Retrospective By Protocol Authorization Ordered    Allergies    1   No Known Drug Allergies    Signatures   Electronically signed by : Betzaida Tyler, ; Feb  3 2017  8:42AM EST                       (Author)

## 2018-01-11 NOTE — MISCELLANEOUS
Message   Recorded as Task   Date: 03/07/2017 11:13 AM, Created By: VIPUL SAINZ   Task Name: Call Back   Assigned To: Patrica Kebede   Regarding Patient: Daniel Lawler, Status: Active   Comment:    Patsy Gear - 07 Mar 2017 11:13 AM     TASK CREATED  Pt called and wanted to know the results to his blood work to determine what he should do regarding his medication  #: [phone]   Spoke with patient - he will taper Prednisone to 5mg PO daily  Rituxan to start 3/9 - weekly CBC check to continue      Active Problems    1  Acute bronchitis (466 0) (J20 9)   2  Acute pharyngitis (462) (J02 9)   3  Acute sinusitis (461 9) (J01 90)   4  Dyspnea on exertion (786 09) (R06 09)   5  Encounter for pre-employment examination (V70 5) (Z02 1)   6  Hemolytic anemia (283 9) (D58 9)   7  History of allergy (V15 09) (Z88 9)   8  Jaundice (782 4) (R17)   9  Strain of lumbar region, initial encounter (847 2) (S39 012A)    Current Meds   1  Aspirin 81 MG CAPS; Therapy: (Recorded:02Nov2016) to Recorded   2  Cyclobenzaprine HCl - 10 MG Oral Tablet; TAKE 1 TABLET 3 TIMES DAILY AS NEEDED; Therapy: 99VJG5440 to (Complete:16Mar2017)  Requested for: 94WRA6413; Last   Rx:06Mar2017 Ordered   3  Ergocalciferol 63951 UNIT CAPS; Therapy: (Recorded:50Oat0733) to Recorded   4  Ferrous Sulfate 325 (65 Fe) MG Oral Tablet; Therapy: (Recorded:48Qys9228) to Recorded   5  One Daily Mens TABS; Therapy: (Recorded:53Fsh3489) to Recorded   6  Pantoprazole Sodium 40 MG Oral Tablet Delayed Release; Therapy: (Recorded:45Gmz2514) to Recorded   7  PredniSONE 10 MG Oral Tablet; 5 tablets by mouth Daily or as directed; Therapy: 58VJV7553 to (Last Rx:02Feb2017)  Requested for: 03Feb2017 Ordered   8  Vitamin B-12 1000 MCG Oral Tablet; Therapy: (Recorded:45Dkn4987) to Recorded    Allergies    1   No Known Drug Allergies    Signatures   Electronically signed by : Rosita Hoffman, ; Mar  7 2017 11:58AM EST                       (Author)

## 2018-01-11 NOTE — MISCELLANEOUS
History of Present Illness  TCM Communication [facility]: The patient is being contacted for follow-up after hospitalization and [facility]  He was hospitalized at [facility]  The dates of hospitalization: November 6, 2017 through November 9, 2017, date of admission: November 6, 2017, date of discharge: November 9, 2017  Diagnosis: SOB and Acute PE  He was discharged to home  Medications were not reviewed today  He did not schedule a follow up appointment  Follow-up appointments with other specialists: patient does not feel like he needs to be seen by Dr Annabel Smiley  Counseling was provided to the patient  Topics counseled included importance of compliance with treatment  Communication performed and completed by Dilcia Plummer      Active Problems    1  Acute bronchitis (466 0) (J20 9)   2  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)   3  Bilateral calf pain (729 5) (M79 661,M79 662)   4  Calf pain (729 5) (M79 669)   5  History of allergy (V15 09) (Z88 9)   6  Pulmonary embolism (415 19) (I26 99)   7  Shortness of breath (786 05) (R06 02)   8  Splenomegaly (789 2) (R16 1)   9  Strain of lumbar region, initial encounter (847 2) (Y34 212G)    Past Medical History    1  History of Dyspnea on exertion (786 09) (R06 09)   2  History of Encounter for pre-employment examination (V70 5) (Z02 1)   3  History of acute bronchitis (V12 69) (Z87 09)   4  History of acute pharyngitis (V12 69) (Z87 09)   5  History of acute sinusitis (V12 69) (Z87 09)   6  History of hemolytic anemia (V12 3) (Z86 2)   7  History of jaundice (V12 29) (Z87 898)   8  History of Preoperative examination (V72 84) (N98 326)    Surgical History    1  History of Arthroscopy Knee Right   2  History of Arthroscopy Shoulder Left   3  History of Elbow Arthroplasty   4  History of Shoulder Surgery    Family History  Mother    1  Family history of Breast Cancer (V16 3)  Father    2  Family history of CABG  Paternal Grandfather    3   Family history of Acute Myocardial Infarction (V17 3)    Social History    · Being A Social Drinker   · Cigars (___ A Day) (305 1)   · Current occasional smoker (305 1) (Z72 0)   · Daily Coffee Consumption (3  Cups/Day)   · Never a smoker    Current Meds   1  Aspirin 81 MG CAPS; Therapy: (Recorded:02Nov2016) to Recorded   2  Calcium TABS; Therapy: (Recorded:20Nov2017) to Recorded   3  Ergocalciferol 38199 UNIT CAPS; Therapy: (Recorded:90Nqt5104) to Recorded   4  Ferrous Sulfate 325 (65 Fe) MG Oral Tablet; Therapy: (Recorded:14Feb2017) to Recorded   5  One Daily Mens TABS; Therapy: (Recorded:31Nly8556) to Recorded   6  Pantoprazole Sodium 40 MG Oral Tablet Delayed Release; Therapy: (Recorded:14Feb2017) to Recorded   7  PredniSONE 2 5 MG Oral Tablet; take as directed; Therapy: 65KHH4445 to (Evaluate:05Sim0844)  Requested for: 87DJT8967; Last   Rx:20Nov2017 Ordered   8  PredniSONE 20 MG Oral Tablet; TAKE 1 TABLET DAILY; Therapy: (Steffany Bryant) to Recorded   9  Vitamin B-12 1000 MCG Oral Tablet; Therapy: (Recorded:14Feb2017) to Recorded   10  Xarelto 20 MG Oral Tablet; 1 by mouth twice daily; Therapy: (Recorded:20Nov2017) to Recorded    Allergies    1   No Known Drug Allergies    Future Appointments    Date/Time Provider Specialty Site   01/22/2018 02:20 PM SHADY Gutierrez , DO, [facility] [address] [place] [address]     Signatures   Electronically signed by : Rommel Maldonado DO; Nov 29 2017  3:49PM EST                       (Author)

## 2018-01-11 NOTE — MISCELLANEOUS
Message  Reviewed that hgb = 9 4 - patient will be seeing Dr Faith Maloney on 6/15/17 @ 3:45      Active Problems    1  Calf pain (729 5) (M79 669)   2  Hemolytic anemia (283 9) (D58 9)   3  History of allergy (V15 09) (Z88 9)   4  Shortness of breath (786 05) (R06 02)   5  Splenomegaly (789 2) (R16 1)   6  Strain of lumbar region, initial encounter (847 2) (S39 012A)    Current Meds   1  Aspirin 81 MG CAPS; Therapy: (Recorded:02Nov2016) to Recorded   2  Benadryl Allergy 25 MG Oral Tablet; TAKE 2 TABLETS (TOTAL OF 50 MG) ONE HOUR   BEFORE SCAN;   Therapy: 94YBN4315 to (Last Rx:05Jun2017)  Requested for: 07Jun2017 Ordered   3  Ergocalciferol 77351 UNIT CAPS; Therapy: (Recorded:89Vzb6987) to Recorded   4  Ferrous Sulfate 325 (65 Fe) MG Oral Tablet; Therapy: (Recorded:39Lsu1204) to Recorded   5  One Daily Mens TABS; Therapy: (Recorded:46Rkj4963) to Recorded   6  Pantoprazole Sodium 40 MG Oral Tablet Delayed Release; Therapy: (Recorded:05Rli4426) to Recorded   7  PredniSONE 20 MG Oral Tablet; 4 po daily  Take with food  taper as directed  Requested   for: 72SMN6647; Last Rx:07Jun2017 Ordered   8  Vitamin B-12 1000 MCG Oral Tablet; Therapy: (Recorded:52Ugu8168) to Recorded    Allergies    1   No Known Drug Allergies    Signatures   Electronically signed by : Joss Meier, ; Jun 12 2017  4:03PM EST                       (Author)

## 2018-01-11 NOTE — MISCELLANEOUS
Message   Recorded as Task   Date: 06/20/2017 02:04 PM, Created By: FOUZIA SAAVEDRA   Task Name: Call Back   Assigned To: Meeta Ziegler   Regarding Patient: Frankie Ruiz, Status: Active   Comment:    Elan Grayson - 20 Jun 2017 2:04 PM     TASK CREATED  Pt called and would like to know the results to the labs he had done this morning at [facility]  Wants to know if he can continue with tx? [phone]   Meeta Ziegler - 20 Jun 2017 2:28 PM     TASK EDITED  spoke with patient  he is on prednisone 60 mg daily   Meeta Ziegler - 20 Jun 2017 2:32 PM     TASK EDITED  cbc reviewed by Dr Stephanie Cox  reduce prednisone to 40 mg dalily  recheck cbc in one week and call for result  patient verbalizes understanding        Active Problems    1  Calf pain (729 5) (M79 669)   2  Hemolytic anemia (283 9) (D58 9)   3  History of allergy (V15 09) (Z88 9)   4  Pulmonary embolism (415 19) (I26 99)   5  Shortness of breath (786 05) (R06 02)   6  Splenomegaly (789 2) (R16 1)   7  Strain of lumbar region, initial encounter (847 2) (S39 012A)    Current Meds   1  Aspirin 81 MG CAPS; Therapy: (Recorded:02Nov2016) to Recorded   2  Ergocalciferol 19341 UNIT CAPS; Therapy: (Recorded:49Jkh9778) to Recorded   3  Ferrous Sulfate 325 (65 Fe) MG Oral Tablet; Therapy: (Recorded:07Scy2019) to Recorded   4  One Daily Mens TABS; Therapy: (Recorded:08Onq7575) to Recorded   5  Pantoprazole Sodium 40 MG Oral Tablet Delayed Release; Therapy: (Recorded:65Ztz2952) to Recorded   6  PredniSONE 20 MG Oral Tablet; 3 po daily  Take with food  taper as directed; Therapy: (Recorded:15Jun2017) to  Requested for: 72PCO8255 Recorded   7  Vitamin B-12 1000 MCG Oral Tablet; Therapy: (Recorded:26Bwa6320) to Recorded   8  Xarelto 15 MG Oral Tablet; Take 1 tablet twice daily; Therapy: (Recorded:15Jun2017) to Recorded    Allergies    1   No Known Drug Allergies    Signatures   Electronically signed by : Wendy Rogers, ; Jun 20 2017  2:32PM EST (Author)

## 2018-01-12 VITALS
HEIGHT: 68 IN | BODY MASS INDEX: 29.25 KG/M2 | RESPIRATION RATE: 16 BRPM | DIASTOLIC BLOOD PRESSURE: 80 MMHG | OXYGEN SATURATION: 97 % | SYSTOLIC BLOOD PRESSURE: 140 MMHG | WEIGHT: 193 LBS | TEMPERATURE: 97.6 F | HEART RATE: 79 BPM

## 2018-01-12 VITALS
RESPIRATION RATE: 16 BRPM | WEIGHT: 185.38 LBS | DIASTOLIC BLOOD PRESSURE: 76 MMHG | TEMPERATURE: 97.8 F | OXYGEN SATURATION: 97 % | SYSTOLIC BLOOD PRESSURE: 152 MMHG | BODY MASS INDEX: 27.46 KG/M2 | HEART RATE: 82 BPM | HEIGHT: 69 IN

## 2018-01-12 NOTE — MISCELLANEOUS
Message   Recorded as Task   Date: 12/16/2016 09:44 AM, Created By: Jenetta Spurling   Task Name: Call Back   Assigned To: Patrica Kebede   Regarding Patient: Annelise Huang, Status: Active   Comment:    Chastity Del Cid - 16 Dec 2016 9:44 AM     TASK CREATED  Caller: Self; Results Inquiry; [phone] (Mobile Phone)  PT HAD BLOOD DRAWN WED AT [address]  CALLING FOR RESULTS, TXS   Labs reviewed with patient - will decrease steroids from 30 to 20mg Po daily - will re check in one week  Pt has follow up with Dr Asya Sommer on 12/20/16      Active Problems    1  Acute bronchitis (466 0) (J20 9)   2  Acute pharyngitis (462) (J02 9)   3  Acute sinusitis (461 9) (J01 90)   4  Dyspnea on exertion (786 09) (R06 09)   5  Encounter for pre-employment examination (V70 5) (Z02 1)   6  Hemolytic anemia (283 9) (D58 9)   7  History of allergy (V15 09) (Z88 9)   8  Jaundice (782 4) (R17)    Current Meds   1  Aspirin 81 MG CAPS; Therapy: (Recorded:43Fyv4425) to Recorded   2  Folic Acid 1 MG Oral Tablet; TAKE 1 TABLET DAILY; Therapy: 03SCF1065 to (Evaluate:62Uaa1551)  Requested for: 57DUL2200; Last   Rx:18Nov2016 Ordered   3  One Daily Mens TABS; Therapy: (Recorded:53Azx3819) to Recorded   4  PredniSONE 20 MG Oral Tablet; 3 tabs daily or as directed; Therapy: 17QHO2519 to (Last Rx:28Nov2016)  Requested for: 28Nov2016 Ordered    Allergies    1   No Known Drug Allergies    Signatures   Electronically signed by : Marilia Hall, ; Dec 16 2016 12:47PM EST                       (Author)

## 2018-01-12 NOTE — MISCELLANEOUS
Message   Recorded as Task   Date: 06/27/2017 01:45 PM, Created By: Iker Molina   Task Name: Call Back   Assigned To: Patrica Kebede   Regarding Patient: Anthony Horton, Status: Active   Comment:    Iker Molina - 27 Jun 2017 1:45 PM     TASK CREATED  Caller: Self; Results Inquiry; [phone] (Home)  [place] [facility]  [facility] [place] [facility], WISHES TO SPEAK TO YOU REGARDING HIS LABS HE HAD DONE  PLEASE CALL   Reviewed with patient that hgb = 11 4  Reduce prednisone to 20mg PO daily  He will re check labs in one week and call for results      Active Problems    1  Calf pain (729 5) (M79 669)   2  Hemolytic anemia (283 9) (D58 9)   3  History of allergy (V15 09) (Z88 9)   4  Pulmonary embolism (415 19) (I26 99)   5  Shortness of breath (786 05) (R06 02)   6  Splenomegaly (789 2) (R16 1)   7  Strain of lumbar region, initial encounter (847 2) (S39 012A)    Current Meds   1  Aspirin 81 MG CAPS; Therapy: (Recorded:02Nov2016) to Recorded   2  Ergocalciferol 69229 UNIT CAPS; Therapy: (Recorded:32Dga2934) to Recorded   3  Ferrous Sulfate 325 (65 Fe) MG Oral Tablet; Therapy: (Recorded:06Rxy6489) to Recorded   4  One Daily Mens TABS; Therapy: (Recorded:04Iuk3012) to Recorded   5  Pantoprazole Sodium 40 MG Oral Tablet Delayed Release; Therapy: (Recorded:54Abm4634) to Recorded   6  PredniSONE 20 MG Oral Tablet; 3 po daily  Take with food  taper as directed; Therapy: (Recorded:15Jun2017) to  Requested for: 13GKY6622 Recorded   7  Vitamin B-12 1000 MCG Oral Tablet; Therapy: (Recorded:03Kfq8903) to Recorded   8  Xarelto 15 MG Oral Tablet; Take 1 tablet twice daily; Therapy: (Recorded:15Jun2017) to Recorded    Allergies    1   No Known Drug Allergies    Signatures   Electronically signed by : Joss Meier, ; Jun 27 2017  1:50PM EST                       (Author)

## 2018-01-13 VITALS
WEIGHT: 195.38 LBS | RESPIRATION RATE: 16 BRPM | BODY MASS INDEX: 29.61 KG/M2 | HEIGHT: 68 IN | HEART RATE: 80 BPM | DIASTOLIC BLOOD PRESSURE: 84 MMHG | SYSTOLIC BLOOD PRESSURE: 152 MMHG | TEMPERATURE: 97.8 F | OXYGEN SATURATION: 98 %

## 2018-01-13 VITALS
BODY MASS INDEX: 27.87 KG/M2 | WEIGHT: 188.19 LBS | DIASTOLIC BLOOD PRESSURE: 78 MMHG | OXYGEN SATURATION: 99 % | TEMPERATURE: 98 F | SYSTOLIC BLOOD PRESSURE: 160 MMHG | RESPIRATION RATE: 15 BRPM | HEART RATE: 72 BPM | HEIGHT: 69 IN

## 2018-01-13 VITALS
HEART RATE: 85 BPM | SYSTOLIC BLOOD PRESSURE: 142 MMHG | BODY MASS INDEX: 29.16 KG/M2 | DIASTOLIC BLOOD PRESSURE: 80 MMHG | WEIGHT: 192.38 LBS | TEMPERATURE: 97.4 F | HEIGHT: 68 IN | RESPIRATION RATE: 16 BRPM | OXYGEN SATURATION: 99 %

## 2018-01-13 VITALS
HEART RATE: 80 BPM | WEIGHT: 184 LBS | TEMPERATURE: 99 F | SYSTOLIC BLOOD PRESSURE: 142 MMHG | BODY MASS INDEX: 27.25 KG/M2 | HEIGHT: 69 IN | DIASTOLIC BLOOD PRESSURE: 84 MMHG | RESPIRATION RATE: 16 BRPM | OXYGEN SATURATION: 99 %

## 2018-01-13 NOTE — MISCELLANEOUS
Message   Recorded as Task   Date: 04/07/2017 02:07 PM, Created By: JOSE DAUGHERTY   Task Name: Call Back   Assigned To: Patrica Kebede   Regarding Patient: Emma Medeiros, Status: Active   Comment:    Albert Espinozas - 07 Apr 2017 2:07 PM     TASK CREATED  Pt called and said he had blood work done recently and he usually calls to discuss it with you  #:[phone]   Patient instructed to increase prednisone to 10mg PO daily  hgb = 9 9  He will call me Monday with an update      Active Problems    1  Acute bronchitis (466 0) (J20 9)   2  Acute pharyngitis (462) (J02 9)   3  Acute sinusitis (461 9) (J01 90)   4  Dyspnea on exertion (786 09) (R06 09)   5  Encounter for pre-employment examination (V70 5) (Z02 1)   6  Hemolytic anemia (283 9) (D58 9)   7  History of allergy (V15 09) (Z88 9)   8  Jaundice (782 4) (R17)   9  Strain of lumbar region, initial encounter (847 2) (S39 012A)    Current Meds   1  Aspirin 81 MG CAPS; Therapy: (Recorded:82Oyj0023) to Recorded   2  Ergocalciferol 41205 UNIT CAPS; Therapy: (Recorded:03Mhl1693) to Recorded   3  Ferrous Sulfate 325 (65 Fe) MG Oral Tablet; Therapy: (Recorded:50Aik1449) to Recorded   4  One Daily Mens TABS; Therapy: (Recorded:89Pbf4680) to Recorded   5  Pantoprazole Sodium 40 MG Oral Tablet Delayed Release; Therapy: (Recorded:50Grj4030) to Recorded   6  PredniSONE 10 MG Oral Tablet; 5 tablets by mouth Daily or as directed; Therapy: 87WCV1842 to (Last Rx:57Mlv1463)  Requested for: 15Wbg6610 Ordered   7  Vitamin B-12 1000 MCG Oral Tablet; Therapy: (Recorded:03Lmw8170) to Recorded    Allergies    1   No Known Drug Allergies    Signatures   Electronically signed by : Jenise Brito, ; Apr 7 2017  2:55PM EST                       (Author)

## 2018-01-13 NOTE — MISCELLANEOUS
Message   Recorded as Task   Date: 04/27/2017 02:08 PM, Created By: Jurgen Copeland   Task Name: Call Back   Assigned To: Patrica Kebede   Regarding Patient: Jeanne Yu, Status: In Progress   Comment:    Chastity Del Cid - 27 Apr 2017 2:08 PM     TASK CREATED  Caller: Self; Results Inquiry; [phone] (Home)  PT HAD BLOOD DRAWN THIS MORNING AT [facility]  PLEASE CALL WITH RESULTS, TXS   Patrica Kebede - 27 Apr 2017 3:19 PM     TASK IN PROGRESS   Spoke with patient - explained hgb = 10 2  Patient will decrease prednisone from 30mg PO daily to 20mg PO daily  Will re check in one week      Active Problems    1  Acute bronchitis (466 0) (J20 9)   2  Acute pharyngitis (462) (J02 9)   3  Acute sinusitis (461 9) (J01 90)   4  Dyspnea on exertion (786 09) (R06 09)   5  Encounter for pre-employment examination (V70 5) (Z02 1)   6  Hemolytic anemia (283 9) (D58 9)   7  History of allergy (V15 09) (Z88 9)   8  Jaundice (782 4) (R17)   9  Strain of lumbar region, initial encounter (847 2) (S39 012A)    Current Meds   1  Aspirin 81 MG CAPS; Therapy: (Recorded:84Jef2300) to Recorded   2  Ergocalciferol 01262 UNIT CAPS; Therapy: (Recorded:28Oam7113) to Recorded   3  Ferrous Sulfate 325 (65 Fe) MG Oral Tablet; Therapy: (Recorded:28Woo7333) to Recorded   4  One Daily Mens TABS; Therapy: (Recorded:31Czd7702) to Recorded   5  Pantoprazole Sodium 40 MG Oral Tablet Delayed Release; Therapy: (Recorded:90Uyw7112) to Recorded   6  PredniSONE 10 MG Oral Tablet; 1 TAB 3 TIMES DAILY; Therapy: (Recorded:10Kvo0086) to Recorded   7  Vitamin B-12 1000 MCG Oral Tablet; Therapy: (Recorded:83Oul3392) to Recorded    Allergies    1   No Known Drug Allergies    Signatures   Electronically signed by : Bettina Porter, ; Apr 27 2017  3:24PM EST                       (Author)

## 2018-01-13 NOTE — MISCELLANEOUS
Message   Recorded as Task   Date: 12/09/2016 11:42 AM, Created By: Paddy Mckeon   Task Name: Call Back   Assigned To: Meeta Ziegler   Regarding Patient: Annelise Huang, Status: In Progress   Comment:    DeepakMonica - 09 Dec 2016 11:42 AM     TASK CREATED  Caller: Self; Results Inquiry; [phone] (Mobile Phone)  Asya Sommer pt: calling for latest blood results and further instruction for decreasing Prednisone by 5mg per week  Please advise  Meeta Ziegler - 09 Dec 2016 11:52 AM     TASK EDITED  patient is on 40 mg daily   Meeta Ziegler - 09 Dec 2016 11:52 AM     TASK IN PROGRESS   Meeta Ziegler - 09 Dec 2016 1:10 PM     TASK EDITED  labs reviewed by Dr Asya Sommer  reduce prednisone to 30 mg daily  and check CBC in one week and call for instructions        Active Problems    1  Acute bronchitis (466 0) (J20 9)   2  Acute pharyngitis (462) (J02 9)   3  Acute sinusitis (461 9) (J01 90)   4  Dyspnea on exertion (786 09) (R06 09)   5  Encounter for pre-employment examination (V70 5) (Z02 1)   6  Hemolytic anemia (283 9) (D58 9)   7  History of allergy (V15 09) (Z88 9)   8  Jaundice (782 4) (R17)    Current Meds   1  Aspirin 81 MG CAPS; Therapy: (Recorded:83Pzr3562) to Recorded   2  Folic Acid 1 MG Oral Tablet; TAKE 1 TABLET DAILY; Therapy: 74NSD6920 to (Evaluate:55Qqj6006)  Requested for: 55ZXP2082; Last   Rx:18Nov2016 Ordered   3  One Daily Mens TABS; Therapy: (Recorded:87Nun3818) to Recorded   4  PredniSONE 20 MG Oral Tablet; 3 tabs daily or as directed; Therapy: 92LAL5291 to (Last Rx:28Nov2016)  Requested for: 28Nov2016 Ordered    Allergies    1   No Known Drug Allergies    Signatures   Electronically signed by : Fortunato Deshpande, ; Dec  9 2016  1:10PM EST                       (Author)

## 2018-01-13 NOTE — MISCELLANEOUS
Message   Recorded as Task   Date: 08/14/2017 11:35 AM, Created By: Pa Canales   Task Name: Call Back   Assigned To: Patrica Kebede   Regarding Patient: Katrin Oropeza, Status: In Progress   Jaclyn Briggs - 14 Aug 2017 11:35 AM     TASK CREATED  Caller: Self; Results Inquiry; [phone] (Home); [phone] (Work)  req that you call him to discuss the results of the bloodwork that was recently done and to discuss the next steps  Call back at [phone]   Patrica Kebede - 14 Aug 2017 12:07 PM     TASK IN PROGRESS   Labs reviewed with Dr Drew Kwan - hgb today = 11 5  Prednisone increased to 10mg PO QOD  Patient will be leaving for [place] this afternoon - he will have lab work checked there weekly and e-mail me resutls to discuss with Dr Drew Kwan  Patient verbalized understanding of instruction      Active Problems    1  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)   2  Calf pain (729 5) (M79 669)   3  History of allergy (V15 09) (Z88 9)   4  Pulmonary embolism (415 19) (I26 99)   5  Shortness of breath (786 05) (R06 02)   6  Splenomegaly (789 2) (R16 1)   7  Strain of lumbar region, initial encounter (847 2) (S39 012A)    Current Meds   1  Aspirin 81 MG CAPS; Therapy: (Recorded:02Nov2016) to Recorded   2  Ergocalciferol 33977 UNIT CAPS; Therapy: (Recorded:97Xre9581) to Recorded   3  Ferrous Sulfate 325 (65 Fe) MG Oral Tablet; Therapy: (Recorded:42Mvb2232) to Recorded   4  One Daily Mens TABS; Therapy: (Recorded:90Fyb7251) to Recorded   5  Pantoprazole Sodium 40 MG Oral Tablet Delayed Release; Therapy: (Recorded:93Ksc5716) to Recorded   6  PredniSONE 10 MG Oral Tablet; TAKE 1 TABLET DAILY AS DIRECTED; Therapy: 20LPD8132 to (Evaluate:12Nov2017)  Requested for: 50Yyf5858; Last   Rx:11Ymn6229; Status: ACTIVE - Retrospective By Protocol Authorization Ordered   7  PredniSONE 5 MG Oral Tablet; 1 tablet daily; Therapy: (Recorded:18Cnm9154) to Recorded   8   Vitamin B-12 1000 MCG Oral Tablet; Therapy: (Recorded:09Psy0702) to Recorded   9  Xarelto 10 MG Oral Tablet; One tablet po daily; Therapy: (Recorded:03Btp4848) to Recorded   10  Xarelto 20 MG Oral Tablet; take 1 tablet every day; Therapy: 94MSB9271 to (Jacquelin Gardner)  Requested for: 46Btr9441; Last    Rx:96Dzy6061; Status: ACTIVE - Retrospective By Protocol Authorization Ordered    Allergies    1   No Known Drug Allergies    Signatures   Electronically signed by : Serge Kim, ; Aug 14 2017  2:20PM EST                       (Author)

## 2018-01-13 NOTE — MISCELLANEOUS
Message  patient called for results of labs  HGB 9 9  He is to remain on the same done of Prednisone 10mg daily per Dr Chrissy Okeefe  Tried to call the patient back but the phone number could not be completed as dialed  I tried numerous times  Active Problems    1  Acute bronchitis (466 0) (J20 9)   2  Acute pharyngitis (462) (J02 9)   3  Acute sinusitis (461 9) (J01 90)   4  Dyspnea on exertion (786 09) (R06 09)   5  Encounter for pre-employment examination (V70 5) (Z02 1)   6  Hemolytic anemia (283 9) (D58 9)   7  History of allergy (V15 09) (Z88 9)   8  Jaundice (782 4) (R17)   9  Preoperative examination (V72 84) (Z01 818)   10  Splenomegaly (789 2) (R16 1)   11  Strain of lumbar region, initial encounter (847 2) (S39 012A)    Current Meds   1  Aspirin 81 MG CAPS; Therapy: (Recorded:61Oap9393) to Recorded   2  Ergocalciferol 59293 UNIT CAPS; Therapy: (Recorded:16Xtu6736) to Recorded   3  Ferrous Sulfate 325 (65 Fe) MG Oral Tablet; Therapy: (Recorded:02Igu9243) to Recorded   4  One Daily Mens TABS; Therapy: (Recorded:81Chy9434) to Recorded   5  Pantoprazole Sodium 40 MG Oral Tablet Delayed Release; Therapy: (Recorded:86Ceu5791) to Recorded   6  PredniSONE 10 MG Oral Tablet; 1 TAB 3 TIMES DAILY; Therapy: (Recorded:30Czf9078) to Recorded   7  Vitamin B-12 1000 MCG Oral Tablet; Therapy: (Recorded:22Kej1655) to Recorded    Allergies    1   No Known Drug Allergies    Signatures   Electronically signed by : Manjeet Allred, ; May 11 2017  3:21PM EST                       (Author)

## 2018-01-13 NOTE — PROGRESS NOTES
Assessment    1  Hemolytic anemia (283 9) (D58 9)    Plan  Hemolytic anemia    · Folic Acid 1 MG Oral Tablet; TAKE 1 TABLET DAILY   Rx By: Akshat Noland; Dispense: 30 Days ; #:30 Tablet; Refill: 2; For: Hemolytic anemia; ANIRUDH = N; Verified Transmission to [facility] #[facility]; Last Updated By: System, [employer]; 2016 10:42:42 AM   · (1) CBC/PLT/DIFF; Status:Active; Requested for:2016;    Perform:[facility]; DAF:52IIL2040; Last Updated Mearl Cassette; 2016 10:28:23 AM;Ordered;  For:Hemolytic anemia; Ordered By:Belman, Truett Burkitt;   · (1) CBC/PLT/DIFF; Status:Active; Requested for:2016;    Perform:[facility]; [id number]; Last Updated By:Oswaldo Almanza; 2016 10:39:23 AM;Ordered;  For:Hemolytic anemia; Ordered By:Belman, Truett Burkitt;   · (1) CBC/PLT/DIFF; Status:Complete; Requested for:Recurring Schedule: 2016;  2016 ;    Perform:[facility]; BRITTANY:10EWP0233; Last Updated Mearl Cassette; 2016 10:39:23 AM;Ordered;  For:Hemolytic anemia; Ordered By:Belman, Truett Burkitt;   · Follow-up visit in 1 month Evaluation and Treatment  Follow-up  Status: Complete  Done:  33RWB8885 11:45AM   Ordered; For: Hemolytic anemia; Ordered By: Fatuma Mendiola Performed:  Due: 87ASF4089; Last Updated By: Brett Parker; 2016 10:39:23 AM    Discussion/Summary  Discussion Summary:   AIHA diagnosed 2016, preceeded by symptoms that began in September  Hemoglobin was 5 2 g/dL, decreased further with hydration, bilrubin>3  CEE 3+  Per his company, he is unable to return to work until he is off prednisone for 90 days  Will treat him appropriately, of course  The etiology for his AIHA is unknown  It is possible that even if he were to have complete remission, relapse may be possible  He currently is on prednisone 80mg po daily  His hemoglobin was 12 3, ; WBC 10 8, platelets 920 84/89/53  He was advised to decrease prednisone to 60mg po daily   He will have cbcd rechecked next week and he will call the office to review/ get instructions regarding prednisone dosing  History of Present Illness  HPI: 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  He typically spends 105 days there, 35 days home  When his plane landed the evening of 10/31/16, his wife remarked how yellow he looked  Patient reported that he had been feeling poorly since mid September  He was starting to feel weak, was experiencing LOWE, his legs were heavy  Few weeks prior to coming home, he thought about seeking out medical care but knew that he would likely need to be Med-i-vaced somewhere  He did not take any new medications, herbal treatments  He has no chronic medical conditions  No known inflammatory conditions  In [place], he states there is known water contamination and there have been instances of food contamination  Air conditioning units have been faulty in the past  He had no pmh similar type symptoms  He saw his PCP upon his return home and was sent to the ED  Admitted to [facility] 11/2 - 11/4/16 secondary to fatigue, SOB, yellowed skin  His hemoglobin was 5 7; after 1 L fluid, it decreased to 4 6 g/dL  His total bilrubin was 5 38; otherwise normal LFTs  HSM noted; spleen 16 2cm; no evidence of lymphoma; no obstructive pathology  Prior to transfusion PRBCS, he had CEE, peripheral smear, LDH    CEE was +3 positive for IgG  Vitamin B12 904  HIV negative  WBCs 2 5, , platelet count 465, 66 neutrophils, 22 lymphocytes, 4 monos, 7 eosinophils, one nucleated red cell  For his autoimmune hemolytic anemia, he was started on prednisone 1mg/kg/day  He was discharged home on prednisone 20mg (2) po bid  11/16/16: His hemoglobin was 12 3, ; WBC 10 8, platelets 383  LDH 11/3/16 was 548  He is feeling better now that his hemoglobin improved  He is experiencing steroid side effects such as increased appetite, restlessness          Review of Systems  Complete-Male:   Constitutional: as noted in HPI  Eyes: No complaints of eye pain, no red eyes, no discharge from eyes, no itchy eyes  ENT: no complaints of earache, no hearing loss, no nosebleeds, no nasal discharge, no sore throat, no hoarseness  Cardiovascular: No complaints of slow heart rate, no fast heart rate, no chest pain, no palpitations, no leg claudication, no lower extremity  Respiratory: No complaints of shortness of breath, no wheezing, no cough, no SOB on exertion, no orthopnea or PND  Gastrointestinal: as noted in HPI  Genitourinary: No complaints of dysuria, no incontinence, no hesitancy, no nocturia, no genital lesion, no testicular pain  Musculoskeletal: No complaints of arthralgia, no myalgias, no joint swelling or stiffness, no limb pain or swelling  Integumentary: No complaints of skin rash or skin lesions, no itching, no skin wound, no dry skin  Neurological: No compliants of headache, no confusion, no convulsions, no numbness or tingling, no dizziness or fainting, no limb weakness, no difficulty walking  Psychiatric: Is not suicidal, no sleep disturbances, no anxiety or depression, no change in personality, no emotional problems  Endocrine: No complaints of proptosis, no hot flashes, no muscle weakness, no erectile dysfunction, no deepening of the voice, no feelings of weakness  Hematologic/Lymphatic: No complaints of swollen glands, no swollen glands in the neck, does not bleed easily, no easy bruising  Active Problems    1  Acute bronchitis (466 0) (J20 9)   2  Acute pharyngitis (462) (J02 9)   3  Acute sinusitis (461 9) (J01 90)   4  Dyspnea on exertion (786 09) (R06 09)   5  Encounter for pre-employment examination (V70 5) (Z02 1)   6  Hemolytic anemia (283 9) (D58 9)   7  History of allergy (V15 09) (Z88 9)   8  Jaundice (782 4) (R17)    Surgical History    1  History of Arthroscopy Knee Right   2  History of Arthroscopy Shoulder Left   3   History of Elbow Arthroplasty  Surgical History Reviewed: The surgical history was reviewed and updated today  Family History  Mother    1  Family history of Breast Cancer (V16 3)  Father    2  Family history of CABG  Paternal Grandfather    3  Family history of Acute Myocardial Infarction (V17 3)  Family History Reviewed: The family history was reviewed and updated today  Social History    · Being A Social Drinker   · Cigars (___ A Day) (305 1)   · Daily Coffee Consumption (3  Cups/Day)   · Never a smoker  Social History Reviewed: The social history was reviewed and updated today  Current Meds   1  Aspirin 81 MG CAPS; Therapy: (Recorded:02Nov2016) to Recorded   2  One Daily Mens TABS; Therapy: (Recorded:46Zqv1898) to Recorded   3  PredniSONE 20 MG Oral Tablet; take 2 tablet twice daily; Therapy: (Recorded:10Nov2016) to Recorded  Medication List Reviewed: The medication list was reviewed and updated today  Allergies    1  No Known Drug Allergies    Vitals  Vital Signs    Recorded: 92NDL8660 91:51BA   Systolic 908   Diastolic 82   Heart Rate 71   Respiration 16   Temperature 97 7 F   O2 Saturation 98   Height 5 ft 8 in   Weight 191 lb    BMI Calculated 29 04   BSA Calculated 2 01     Physical Exam    Constitutional   General appearance: No acute distress, well appearing and well nourished  Eyes   Conjunctiva and lids: No swelling, erythema, or discharge  Pupils and irises: Equal, round and reactive to light  Ears, Nose, Mouth, and Throat   External inspection of ears and nose: Normal     Nasal mucosa, septum, and turbinates: Normal without edema or erythema  Oropharynx: Normal with no erythema, edema, exudate or lesions  Pulmonary   Respiratory effort: No increased work of breathing or signs of respiratory distress  Auscultation of lungs: Clear to auscultation, equal breath sounds bilaterally, no wheezes, no rales, no rhonci      Cardiovascular   Palpation of heart: Normal PMI, no thrills  Auscultation of heart: Normal rate and rhythm, normal S1 and S2, without murmurs  Examination of extremities for edema and/or varicosities: Normal     Abdomen   Abdomen: Non-tender, no masses  liver edge palpable with deep inspiration  Lymphatic   Palpation of lymph nodes in neck: No lymphadenopathy  Musculoskeletal   Gait and station: Normal     Skin   Skin and subcutaneous tissue: Normal without rashes or lesions      Psychiatric   Orientation to person, place and time: Normal     Mood and affect: Normal         ECOG 0       Future Appointments    Date/Time Provider Specialty Site   12/20/2016 11:45 AM SHADY Karimi , DO, [facility] H[facility]ology Oncology [facility]     Signatures   Electronically signed by : Una Walker, [facility]; Nov 18 2016  4:55PM EST                       (Author)    Electronically signed by : SHADY Dean ,DO; Nov 28 2016  5:00PM EST

## 2018-01-13 NOTE — MISCELLANEOUS
Message   Recorded as Task   Date: 09/05/2017 09:29 AM, Created By: Sola Garcia   Task Name: Call Back   Assigned To: Patrica Kebede   Regarding Patient: Wero Morrow, Status: Active   CommentSiegfried Cash - 05 Sep 2017 9:29 AM     TASK CREATED  Pt called wanting a call back to discuss BW that was drawn on Saturday  Call back number for pt is [phone]  Spoke with patient regarding his lab work - he will continue on current prednisone dose of 20mg PO every other day  he will call me Friday for scan results regarding his spleen      Active Problems    1  Acute bronchitis (466 0) (J20 9)   2  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)   3  Calf pain (729 5) (M79 669)   4  History of allergy (V15 09) (Z88 9)   5  Pulmonary embolism (415 19) (I26 99)   6  Shortness of breath (786 05) (R06 02)   7  Splenomegaly (789 2) (R16 1)   8  Strain of lumbar region, initial encounter (847 2) (S39 012A)    Current Meds   1  Aspirin 81 MG CAPS; Therapy: (Recorded:02Nov2016) to Recorded   2  Ergocalciferol 70627 UNIT CAPS; Therapy: (Recorded:33Wxr7244) to Recorded   3  Ferrous Sulfate 325 (65 Fe) MG Oral Tablet; Therapy: (Recorded:01Rwi0721) to Recorded   4  One Daily Mens TABS; Therapy: (Recorded:87Bpx9307) to Recorded   5  Pantoprazole Sodium 40 MG Oral Tablet Delayed Release; Therapy: (Recorded:84Dyg3943) to Recorded   6  PredniSONE 20 MG Oral Tablet; TAKE 1 TABLET DAILY; Therapy: (Ariel Marcus) to Recorded   7  Vitamin B-12 1000 MCG Oral Tablet; Therapy: (Recorded:31Evt5321) to Recorded   8  Zithromax Z-Pablo 250 MG Oral Tablet (Azithromycin); TAKE 2 TABLETS ON DAY 1 THEN   TAKE 1 TABLET A DAY FOR 4 DAYS; Therapy: 74Gxg7699 to (Last Rx:34Ojs0512)  Requested for: 40Uxp7767 Ordered    Allergies    1   No Known Drug Allergies    Signatures   Electronically signed by : Telma May, ; Sep  6 2017  8:19AM EST                       (Author)

## 2018-01-14 VITALS
RESPIRATION RATE: 16 BRPM | OXYGEN SATURATION: 97 % | BODY MASS INDEX: 27.45 KG/M2 | TEMPERATURE: 97.8 F | DIASTOLIC BLOOD PRESSURE: 78 MMHG | SYSTOLIC BLOOD PRESSURE: 140 MMHG | HEIGHT: 69 IN | HEART RATE: 75 BPM | WEIGHT: 185.31 LBS

## 2018-01-14 VITALS
RESPIRATION RATE: 16 BRPM | HEIGHT: 68 IN | OXYGEN SATURATION: 97 % | DIASTOLIC BLOOD PRESSURE: 72 MMHG | SYSTOLIC BLOOD PRESSURE: 120 MMHG | WEIGHT: 188 LBS | BODY MASS INDEX: 28.49 KG/M2 | TEMPERATURE: 98.2 F | HEART RATE: 76 BPM

## 2018-01-14 VITALS
TEMPERATURE: 98.1 F | HEIGHT: 68 IN | SYSTOLIC BLOOD PRESSURE: 142 MMHG | DIASTOLIC BLOOD PRESSURE: 82 MMHG | HEART RATE: 76 BPM | WEIGHT: 197 LBS | RESPIRATION RATE: 16 BRPM | BODY MASS INDEX: 29.86 KG/M2

## 2018-01-14 VITALS
OXYGEN SATURATION: 98 % | HEART RATE: 75 BPM | BODY MASS INDEX: 28.16 KG/M2 | HEIGHT: 69 IN | DIASTOLIC BLOOD PRESSURE: 76 MMHG | WEIGHT: 190.13 LBS | RESPIRATION RATE: 16 BRPM | TEMPERATURE: 98.8 F | SYSTOLIC BLOOD PRESSURE: 138 MMHG

## 2018-01-14 VITALS
BODY MASS INDEX: 29.61 KG/M2 | HEART RATE: 73 BPM | SYSTOLIC BLOOD PRESSURE: 132 MMHG | OXYGEN SATURATION: 98 % | HEIGHT: 68 IN | DIASTOLIC BLOOD PRESSURE: 86 MMHG | RESPIRATION RATE: 16 BRPM | WEIGHT: 195.38 LBS | TEMPERATURE: 97.5 F

## 2018-01-14 NOTE — MISCELLANEOUS
Message   Recorded as Task   Date: 02/28/2017 01:16 PM, Created By: Juanita Duarte   Task Name: Call Back   Assigned To: Patrica Kebede   Regarding Patient: Emma Medeiros, Status: Active   Comment:    Haven Felipeheather - 28 Feb 2017 1:16 PM     TASK CREATED  Caller: Self; Personal; [phone] (Home)  stated that he wanted to get results from the bloodwork from 2 27 17  He wants to discuss his chemo tx and wants to know how he can go about that  He was trying to get a hold of you yesterday so he req that you call him back as soon as possible  Req that you call him back at [phone]  Labs reviewed with patient - he will taper his prednisone by 10mg  Patient decided he does wish to start IV Rituxan weekly x 4  Arranged to begin 3/9/17 - Spoke with patient - he verbalized understanding      Active Problems    1  Acute bronchitis (466 0) (J20 9)   2  Acute pharyngitis (462) (J02 9)   3  Acute sinusitis (461 9) (J01 90)   4  Dyspnea on exertion (786 09) (R06 09)   5  Encounter for pre-employment examination (V70 5) (Z02 1)   6  Hemolytic anemia (283 9) (D58 9)   7  History of allergy (V15 09) (Z88 9)   8  Jaundice (782 4) (R17)    Current Meds   1  Aspirin 81 MG CAPS; Therapy: (Recorded:02Nov2016) to Recorded   2  Ergocalciferol 81572 UNIT CAPS; Therapy: (Recorded:02Zbt5625) to Recorded   3  Ferrous Sulfate 325 (65 Fe) MG Oral Tablet; Therapy: (Recorded:91Hjd4177) to Recorded   4  One Daily Mens TABS; Therapy: (Recorded:81Uap7395) to Recorded   5  Pantoprazole Sodium 40 MG Oral Tablet Delayed Release; Therapy: (Recorded:18Ige7372) to Recorded   6  PredniSONE 10 MG Oral Tablet; 5 tablets by mouth Daily or as directed; Therapy: 20VGF0758 to (Last Rx:02Feb2017)  Requested for: 25Ihz4278 Ordered   7  PredniSONE 50 MG Oral Tablet; take 1 by mouth each day as directed; Therapy: 18Qot2799 to (Evaluate:23Mar2017)  Requested for: 84Tuy9788; Last   Rx:78Rxi1790 Ordered   8   Vitamin B-12 1000 MCG Oral Tablet; Therapy: (Recorded:86Xti9778) to Recorded    Allergies    1   No Known Drug Allergies    Signatures   Electronically signed by : Precious Eugene, ; Feb 28 2017  1:50PM EST                       (Author)

## 2018-01-14 NOTE — MISCELLANEOUS
History of Present Illness  TCM Communication [facility]: The patient is being contacted for follow-up after hospitalization and [facility]  He was hospitalized at [facility]  The dates of hospitalization: May 17, 2017 through May 22,2017, date of admission: May 17, 2017, date of discharge: May 22, 2017  Diagnosis: Splenectomy  He was discharged to home  Medications were not reviewed today  He did not schedule a follow up appointment  Follow-up appointments with other specialists: will f/u with surgeon  Counseling was provided to the patient  Topics counseled included importance of compliance with treatment  Communication performed and completed by Joey Saravia      Active Problems    1  Acute bronchitis (466 0) (J20 9)   2  Acute pharyngitis (462) (J02 9)   3  Acute sinusitis (461 9) (J01 90)   4  Dyspnea on exertion (786 09) (R06 09)   5  Encounter for pre-employment examination (V70 5) (Z02 1)   6  Hemolytic anemia (283 9) (D58 9)   7  History of allergy (V15 09) (Z88 9)   8  Jaundice (782 4) (R17)   9  Preoperative examination (V72 84) (Z01 818)   10  Splenomegaly (789 2) (R16 1)   11  Strain of lumbar region, initial encounter (847 2) (N20 386J)    Surgical History    1  History of Arthroscopy Knee Right   2  History of Arthroscopy Shoulder Left   3  History of Elbow Arthroplasty   4  History of Shoulder Surgery    Family History  Mother    1  Family history of Breast Cancer (V16 3)  Father    2  Family history of CABG  Paternal Grandfather    3  Family history of Acute Myocardial Infarction (V17 3)    Social History    · Being A Social Drinker   · Cigars (___ A Day) (305 1)   · Current occasional smoker (305 1) (Z72 0)   · Daily Coffee Consumption (3  Cups/Day)   · Never a smoker    Current Meds   1  Aspirin 81 MG CAPS; Therapy: (Recorded:02Nov2016) to Recorded   2  Ergocalciferol 89842 UNIT CAPS; Therapy: (Recorded:30Wbh8946) to Recorded   3   Ferrous Sulfate 325 (65 Fe) MG Oral Tablet; Therapy: (Recorded:95Ubc4263) to Recorded   4  One Daily Mens TABS; Therapy: (Recorded:40Jrb5754) to Recorded   5  Pantoprazole Sodium 40 MG Oral Tablet Delayed Release; Therapy: (Recorded:76Pmt7308) to Recorded   6  PredniSONE 10 MG Oral Tablet; 1 TAB 3 TIMES DAILY; Therapy: (Recorded:25Oio4630) to Recorded   7  Vitamin B-12 1000 MCG Oral Tablet; Therapy: (Recorded:55Cbg4482) to Recorded    Allergies    1   No Known Drug Allergies    Future Appointments    Date/Time Provider Specialty Site   07/18/2017 08:45 AM SHADY Patel , , [facility] Hematology Oncology [facility]   06/05/2017 09:00 AM Keerthi Melgar MD Surgical Oncology [facility]     Signatures   Electronically signed by : Trace Castillo DO; May 30 2017  7:40PM EST                       (Author)

## 2018-01-15 VITALS
TEMPERATURE: 97.7 F | BODY MASS INDEX: 28.44 KG/M2 | RESPIRATION RATE: 16 BRPM | SYSTOLIC BLOOD PRESSURE: 130 MMHG | HEART RATE: 76 BPM | DIASTOLIC BLOOD PRESSURE: 74 MMHG | HEIGHT: 69 IN | WEIGHT: 192 LBS

## 2018-01-15 VITALS
BODY MASS INDEX: 29.33 KG/M2 | OXYGEN SATURATION: 99 % | SYSTOLIC BLOOD PRESSURE: 148 MMHG | RESPIRATION RATE: 16 BRPM | HEART RATE: 81 BPM | HEIGHT: 68 IN | WEIGHT: 193.5 LBS | TEMPERATURE: 97.5 F | DIASTOLIC BLOOD PRESSURE: 68 MMHG

## 2018-01-15 NOTE — MISCELLANEOUS
Message   Recorded as Task   Date: 05/02/2017 02:51 PM, Created By: Dimitris Harrison   Task Name: Call Back   Assigned To: Patrica Kebede   Regarding Patient: Michelet Howard, Status: Active   CommentMarcia Tidwell - 02 May 2017 2:51 PM     TASK CREATED  Caller: Sophia Camejo; Other; [phone] (Work)  [address]  WOULD LIKE TO DISCUSS WHICH VACCINATIONS PT MAY NEED ON HIS RETURN VISIT TO THEIR OFFICE ON FRIDAY BEFORE HIS SPLEENECTOMY SURGERY  PLEASE CALL TO DISCUSS, TXS   Spoke with PCP - explained patient will need HIB and meningococcal  Pneumovax was given in Feb before discharge  Active Problems    1  Acute bronchitis (466 0) (J20 9)   2  Acute pharyngitis (462) (J02 9)   3  Acute sinusitis (461 9) (J01 90)   4  Dyspnea on exertion (786 09) (R06 09)   5  Encounter for pre-employment examination (V70 5) (Z02 1)   6  Hemolytic anemia (283 9) (D58 9)   7  History of allergy (V15 09) (Z88 9)   8  Jaundice (782 4) (R17)   9  Splenomegaly (789 2) (R16 1)   10  Strain of lumbar region, initial encounter (847 2) (S39 012A)    Current Meds   1  Aspirin 81 MG CAPS; Therapy: (Recorded:63Boa5201) to Recorded   2  Ergocalciferol 04960 UNIT CAPS; Therapy: (Recorded:23Byb1059) to Recorded   3  Ferrous Sulfate 325 (65 Fe) MG Oral Tablet; Therapy: (Recorded:90Iae9104) to Recorded   4  One Daily Mens TABS; Therapy: (Recorded:66Etu4756) to Recorded   5  Oxycodone-Acetaminophen  MG Oral Tablet (Percocet); TAKE 1 TABLET EVERY   4 TO 6 HOURS AS NEEDED FOR PAIN;   Therapy: 61YZM7976 to (Last Rx:02May2017) Ordered   6  Pantoprazole Sodium 40 MG Oral Tablet Delayed Release; Therapy: (Recorded:88Let0601) to Recorded   7  PredniSONE 10 MG Oral Tablet; 1 TAB 3 TIMES DAILY; Therapy: (Recorded:32Pnu0284) to Recorded   8  Vitamin B-12 1000 MCG Oral Tablet; Therapy: (Recorded:69Bwu8004) to Recorded    Allergies    1   No Known Drug Allergies    Signatures   Electronically signed by : Carmen Shah, ; May  2 2017  3:16PM EST                       (Author)

## 2018-01-15 NOTE — MISCELLANEOUS
Message   Recorded as Task   Date: 11/13/2017 01:55 PM, Created By: Mell Huffman   Task Name: Call Back   Assigned To: Patrica Kebede   Regarding Patient: Enedelia Adrian, Status: Active   Comment:    Mell Huffman - 13 Nov 2017 1:55 PM     TASK CREATED  Carol Mckay called wanting to know if his dosage was going to change for his Prednisone based on his recent lab results  He can be reached at [phone]   Labs reviewed with patient - he will continue on Prednisone 20mg PO daily  He will repeat CBC on Monday and see Dr Anna Ho after  Patient was recently hospitalized and treated for recurrent PE  Xarelto restarted      Active Problems    1  Acute bronchitis (466 0) (J20 9)   2  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)   3  Bilateral calf pain (729 5) (M79 661,M79 662)   4  Calf pain (729 5) (M79 669)   5  History of allergy (V15 09) (Z88 9)   6  Pulmonary embolism (415 19) (I26 99)   7  Shortness of breath (786 05) (R06 02)   8  Splenomegaly (789 2) (R16 1)   9  Strain of lumbar region, initial encounter (847 2) (S39 012A)    Current Meds   1  Amoxicillin-Pot Clavulanate 875-125 MG Oral Tablet; TAKE 1 TABLET EVERY 12   HOURS DAILY; Therapy: 68UAO6755 to [phone])  Requested for: 70RUQ2204; Last   Rx:17Oct2017 Ordered   2  Aspirin 81 MG CAPS; Therapy: (Recorded:02Nov2016) to Recorded   3  Ergocalciferol 30045 UNIT CAPS; Therapy: (Recorded:64Ajm4551) to Recorded   4  Ferrous Sulfate 325 (65 Fe) MG Oral Tablet; Therapy: (Recorded:03Ugc3884) to Recorded   5  One Daily Mens TABS; Therapy: (Recorded:69Gqc4966) to Recorded   6  Pantoprazole Sodium 40 MG Oral Tablet Delayed Release; Therapy: (Recorded:26Gca4752) to Recorded   7  PredniSONE 20 MG Oral Tablet; TAKE 1 TABLET DAILY; Therapy: (Jelena Hardy) to Recorded   8  Vitamin B-12 1000 MCG Oral Tablet; Therapy: (Recorded:02Gig1113) to Recorded    Allergies    1   No Known Drug Allergies    Plan  Autoimmune hemolytic anemia    · (1) CBC/PLT/DIFF; Status:Active - Retrospective By Protocol Authorization;  Requested  VPT:96MBL9992;     Signatures   Electronically signed by : Casey Barkley, ; Nov 13 2017  2:31PM EST                       (Author)

## 2018-01-15 NOTE — MISCELLANEOUS
Message   Recorded as Task   Date: 08/28/2017 08:45 AM, Created By: Lucy Garza   Task Name: Care Coordination   Assigned To: Patrica Kebede   Regarding Patient: Dixon Lynn, Status: Active   CommentLynda Martinez - 28 Aug 2017 8:45 AM     TASK CREATED  Caller: Self; Care Coordination; [phone] (Home)  stated that he is returning your call  Req a call back at [phone]  I read him your last telephone note but he still req to speak with you  Patrica Kebede - 28 Aug 2017 8:47 AM     TASK REASSIGNED: Previously Assigned To Angel Lugo - 28 Aug 2017 8:52 AM     TASK REASSIGNED: Previously Assigned To Fei Medina with patient - explained hgb = 10 3  NO transfusion needed  Patient has multiple antibodies - additional lab work will be needed for future transfusions    Patient will be seeing Dr Noralyn Mohs    1  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)   2  Calf pain (729 5) (M79 669)   3  History of allergy (V15 09) (Z88 9)   4  Pulmonary embolism (415 19) (I26 99)   5  Shortness of breath (786 05) (R06 02)   6  Splenomegaly (789 2) (R16 1)   7  Strain of lumbar region, initial encounter (847 2) (S39 012A)    Current Meds   1  Aspirin 81 MG CAPS; Therapy: (Recorded:02Nov2016) to Recorded   2  Ergocalciferol 96078 UNIT CAPS; Therapy: (Recorded:34Kag9949) to Recorded   3  Ferrous Sulfate 325 (65 Fe) MG Oral Tablet; Therapy: (Recorded:56Inp7981) to Recorded   4  One Daily Mens TABS; Therapy: (Recorded:72Abb1802) to Recorded   5  Pantoprazole Sodium 40 MG Oral Tablet Delayed Release; Therapy: (Recorded:56Iae2637) to Recorded   6  PredniSONE 10 MG Oral Tablet; TAKE 1 TABLET DAILY AS DIRECTED; Therapy: 12TSJ0825 to (Evaluate:12Nov2017)  Requested for: 14Aug2017; Last   Rx:14Aug2017; Status: ACTIVE - Retrospective By Protocol Authorization Ordered   7  PredniSONE 5 MG Oral Tablet; 1 tablet daily;    Therapy: (Recorded:61Puh2616) to Recorded   8  Vitamin B-12 1000 MCG Oral Tablet; Therapy: (Recorded:21Zgp5622) to Recorded   9  Xarelto 10 MG Oral Tablet; One tablet po daily; Therapy: (Recorded:21Qqi5535) to Recorded   10  Xarelto 20 MG Oral Tablet; take 1 tablet every day; Therapy: 10ZAL9688 to (Carla Ballesteros)  Requested for: 16Ksh6514; Last    Rx:23Nee6921; Status: ACTIVE - Retrospective By Protocol Authorization Ordered    Allergies    1   No Known Drug Allergies    Signatures   Electronically signed by : Charley Sharma, ; Aug 28 2017  8:55AM EST                       (Author)

## 2018-01-15 NOTE — MISCELLANEOUS
Message   Recorded as Task   Date: 11/25/2016 11:37 AM, Created By: Shon Orta   Task Name: Call Back   Assigned To: Patrica Kebede   Regarding Patient: Wei Aleman, Status: Active   Comment:    Deanna Soteloy - 25 Nov 2016 11:37 AM     TASK CREATED  Caller: Self; Results Inquiry; [phone] (Mobile Phone)  Pt calling for blood test results  Please call  Labs reviewed with patient - ok to decrease Prednisone from 60mg PO QD to 50mg PO QD - he will call me next week for new instructions      Active Problems    1  Acute bronchitis (466 0) (J20 9)   2  Acute pharyngitis (462) (J02 9)   3  Acute sinusitis (461 9) (J01 90)   4  Dyspnea on exertion (786 09) (R06 09)   5  Encounter for pre-employment examination (V70 5) (Z02 1)   6  Hemolytic anemia (283 9) (D58 9)   7  History of allergy (V15 09) (Z88 9)   8  Jaundice (782 4) (R17)    Current Meds   1  Aspirin 81 MG CAPS; Therapy: (Recorded:02Nov2016) to Recorded   2  Folic Acid 1 MG Oral Tablet; TAKE 1 TABLET DAILY; Therapy: 88KDJ7623 to (Evaluate:64Acr4449)  Requested for: 93HDA2228; Last   Rx:18Nov2016 Ordered   3  One Daily Mens TABS; Therapy: (Recorded:20Rxj9123) to Recorded   4  PredniSONE 20 MG Oral Tablet; take 2 tablet twice daily; Therapy: (Recorded:10Nov2016) to Recorded    Allergies    1   No Known Drug Allergies    Signatures   Electronically signed by : Carmen Shah, ; Nov 25 2016  1:04PM EST                       (Author)

## 2018-01-15 NOTE — MISCELLANEOUS
Message   Recorded as Task   Date: 12/02/2016 11:10 AM, Created By: RORO COLEMAN   Task Name: Call Back   Assigned To: Patrica Kebede   Regarding Patient: Frankie Ruiz, Status: Active   Comment:    Elan Grayson - 02 Dec 2016 11:10 AM     TASK CREATED  Pt would like to know based on his bloodwork from 11/23 if he needed to adjust his prednisone  Callback#: [phone]   Labs stable - patient instructed to reduce prednisone from 50mg - 40mg  Will re check and call next week for new instructions      Active Problems    1  Acute bronchitis (466 0) (J20 9)   2  Acute pharyngitis (462) (J02 9)   3  Acute sinusitis (461 9) (J01 90)   4  Dyspnea on exertion (786 09) (R06 09)   5  Encounter for pre-employment examination (V70 5) (Z02 1)   6  Hemolytic anemia (283 9) (D58 9)   7  History of allergy (V15 09) (Z88 9)   8  Jaundice (782 4) (R17)    Current Meds   1  Aspirin 81 MG CAPS; Therapy: (Recorded:03Yud2477) to Recorded   2  Folic Acid 1 MG Oral Tablet; TAKE 1 TABLET DAILY; Therapy: 34EWD6550 to (Evaluate:20Mlx0766)  Requested for: 52DHT0732; Last   Rx:18Nov2016 Ordered   3  One Daily Mens TABS; Therapy: (Recorded:72Yhx6306) to Recorded   4  PredniSONE 20 MG Oral Tablet; 3 tabs daily or as directed; Therapy: 34LKM7476 to (Last Rx:28Nov2016)  Requested for: 28Nov2016 Ordered    Allergies    1   No Known Drug Allergies    Signatures   Electronically signed by : Clement Wise, ; Dec  2 2016 11:15AM EST                       (Author)

## 2018-01-15 NOTE — MISCELLANEOUS
Message   Recorded as Task   Date: 11/18/2016 10:40 AM, Created By: Nikita Woodson   Task Name: Follow Up   Assigned To: Patrica Kebede   Regarding Patient: Jessenia Shields, Status: Active   CommentMarshel Organ - 18 Nov 2016 10:40 AM     TASK CREATED  FYI, Seen by Dr Reynaldo Torres in patient regarding hemolytic anemia  Decreased today from prednisone 80mg to 60mg  He will have CBC rechecked next wednesday AM and was instructed to call you in the afternoon to review to potentially decrease  He was given slip for weekly cbcd  Will see Dr Reynaldo Torres in 1 month    FY, Patient goes by the name Antwon Hamilton  Active Problems    1  Acute bronchitis (466 0) (J20 9)   2  Acute pharyngitis (462) (J02 9)   3  Acute sinusitis (461 9) (J01 90)   4  Dyspnea on exertion (786 09) (R06 09)   5  Encounter for pre-employment examination (V70 5) (Z02 1)   6  Hemolytic anemia (283 9) (D58 9)   7  History of allergy (V15 09) (Z88 9)   8  Jaundice (782 4) (R17)    Current Meds   1  Aspirin 81 MG CAPS; Therapy: (Recorded:02Nov2016) to Recorded   2  Folic Acid 1 MG Oral Tablet; TAKE 1 TABLET DAILY; Therapy: 37FXN6872 to (Evaluate:48Ity0312)  Requested for: 45QMK9828; Last   Rx:18Nov2016 Ordered   3  One Daily Mens TABS; Therapy: (Recorded:80Kxs8217) to Recorded   4  PredniSONE 20 MG Oral Tablet; take 2 tablet twice daily; Therapy: (Recorded:10Nov2016) to Recorded    Allergies    1   No Known Drug Allergies    Signatures   Electronically signed by : Sixto Palmer, ; Nov 18 2016 10:45AM EST                       (Author)

## 2018-01-15 NOTE — MISCELLANEOUS
Message   Recorded as Task   Date: 03/23/2017 01:09 PM, Created By: Binh Oviedo   Task Name: Call Back   Assigned To: Patrica Kebede   Regarding Patient: Karina Reeder, Status: Active   Comment:    Monica Sotelo - 23 Mar 2017 1:09 PM     TASK CREATED  Caller: Self; Results Inquiry; [phone] (Home)  Please call back with lab results  Spoke with patient - he will continue on prednisone 5mg every other day  re check CBC in one week  Explained to patient that it can take weeks to months for Rituxan to work  He will call me next week for results and further instructions for steroids  Active Problems    1  Acute bronchitis (466 0) (J20 9)   2  Acute pharyngitis (462) (J02 9)   3  Acute sinusitis (461 9) (J01 90)   4  Dyspnea on exertion (786 09) (R06 09)   5  Encounter for pre-employment examination (V70 5) (Z02 1)   6  Hemolytic anemia (283 9) (D58 9)   7  History of allergy (V15 09) (Z88 9)   8  Jaundice (782 4) (R17)   9  Strain of lumbar region, initial encounter (847 2) (S39 012A)    Current Meds   1  Aspirin 81 MG CAPS; Therapy: (Recorded:14Htn1476) to Recorded   2  Ergocalciferol 42587 UNIT CAPS; Therapy: (Recorded:85Sdo0569) to Recorded   3  Ferrous Sulfate 325 (65 Fe) MG Oral Tablet; Therapy: (Recorded:25Jmc8622) to Recorded   4  One Daily Mens TABS; Therapy: (Recorded:08Weq5084) to Recorded   5  Pantoprazole Sodium 40 MG Oral Tablet Delayed Release; Therapy: (Recorded:56Jjq7546) to Recorded   6  PredniSONE 10 MG Oral Tablet; 5 tablets by mouth Daily or as directed; Therapy: 80SEI3171 to (Last Rx:91Pgs4034)  Requested for: 03Feb2017 Ordered   7  Vitamin B-12 1000 MCG Oral Tablet; Therapy: (Recorded:00Vji8281) to Recorded    Allergies    1   No Known Drug Allergies    Signatures   Electronically signed by : Peggy Mesa, ; Mar 23 2017  3:07PM EST                       (Author)

## 2018-01-16 NOTE — MISCELLANEOUS
Message  Spoke with patient - reviewed hgb = 9 9  Patient will increase prednisone to 5mg daily  His last Rituxan is today  Patient will call me next week for CBC results      Active Problems    1  Acute bronchitis (466 0) (J20 9)   2  Acute pharyngitis (462) (J02 9)   3  Acute sinusitis (461 9) (J01 90)   4  Dyspnea on exertion (786 09) (R06 09)   5  Encounter for pre-employment examination (V70 5) (Z02 1)   6  Hemolytic anemia (283 9) (D58 9)   7  History of allergy (V15 09) (Z88 9)   8  Jaundice (782 4) (R17)   9  Strain of lumbar region, initial encounter (847 2) (S39 012A)    Current Meds   1  Aspirin 81 MG CAPS; Therapy: (Recorded:36Owq1218) to Recorded   2  Ergocalciferol 54329 UNIT CAPS; Therapy: (Recorded:62Ecv6982) to Recorded   3  Ferrous Sulfate 325 (65 Fe) MG Oral Tablet; Therapy: (Recorded:06Fxr7089) to Recorded   4  One Daily Mens TABS; Therapy: (Recorded:70Gxp9639) to Recorded   5  Pantoprazole Sodium 40 MG Oral Tablet Delayed Release; Therapy: (Recorded:70Rkg2565) to Recorded   6  PredniSONE 10 MG Oral Tablet; 5 tablets by mouth Daily or as directed; Therapy: 08STD4568 to (Last Rx:37Vxw1154)  Requested for: 27Sdd0468 Ordered   7  Vitamin B-12 1000 MCG Oral Tablet; Therapy: (Recorded:95Ege7254) to Recorded    Allergies    1   No Known Drug Allergies    Signatures   Electronically signed by : Bettina Porter, ; Mar 30 2017 11:50AM EST                       (Author)

## 2018-01-16 NOTE — MISCELLANEOUS
Assessment    1  Hemolytic anemia (283 9) (D58 9)    Discussion/Summary  Discussion Summary:   Patient to continue present treatment  Patient to follow-up with hematology and gastroenterology as scheduled  Patient to return to the office when necessary  Patient awaiting release to work per hematology  Medication SE Review and Pt Understands Tx: Possible side effects of new medications were reviewed with the patient/guardian today  The treatment plan was reviewed with the patient/guardian  The patient/guardian understands and agrees with the treatment plan      Chief Complaint  Chief Complaint Free Text Note Form: Hospital Follow Up Anemia      History of Present Illness  TCM Communication [facility]: The patient is being contacted for follow-up after hospitalization  Hospital records were reviewed  He was hospitalized at [facility]  The dates of hospitalization:, date of admission: 2/3/17, date of discharge: 2/5/17, February 3/17 through February 5, 2017  Diagnosis: Anemia hemolytic  He was discharged to home  Medications reviewed and updated today  He scheduled a follow up appointment  Follow-up appointments with other specialists: Dr Madhu Beebe, hematologist 2/21/17 and Dr Jamal Merino GI 2/16/17  Symptoms: no fever, no weakness, no dizziness, no headache, no fatigue, no cough, no shortness of breath, no chest pain, no back pain on left side, no back pain on right side, no arm pain left side, no arm pain on right side, no leg pain on left side, no leg pain on right side, no upper abdominal pain, no middle abdominal pain, no lower abdominal pain, no rash:, no anorexia, no nausea, no vomiting, no loose stools, no constipation, no pain with urinating and no swelling  The patient is currently experiencing symptoms  Counseling was provided to the patient  Topics counseled included importance of compliance with treatment     Communication performed and completed by Hannah Mariee   HPI: Patient is being seen in follow-up from recent hospitalization at [place] [address] from 2/3/17 until 2/5/17 for reoccurrence of hemolytic anemia  Patient was discharged home on prednisone taper and is down to 30 mg daily this week  Hemoglobin was up to 10 0 yesterday  Patient has follow-up appointment with Dr Fabrizio Vincent, hematologist on 2/21/17 and has an appointment with Dr Nilda Garcia, gastroenterologist on 2/16/17  Patient is feeling significantly better overall without complaints  Patient is anxious to return to work when cleared by hematology  Patient states he received flu vaccine and pneumonia vaccine in the hospital        Active Problems    1  Acute bronchitis (466 0) (J20 9)   2  Acute pharyngitis (462) (J02 9)   3  Acute sinusitis (461 9) (J01 90)   4  Dyspnea on exertion (786 09) (R06 09)   5  Encounter for pre-employment examination (V70 5) (Z02 1)   6  Hemolytic anemia (283 9) (D58 9)   7  History of allergy (V15 09) (Z88 9)   8  Jaundice (782 4) (R17)    Surgical History    1  History of Arthroscopy Knee Right   2  History of Arthroscopy Shoulder Left   3  History of Elbow Arthroplasty    Family History  Mother    1  Family history of Breast Cancer (V16 3)  Father    2  Family history of CABG  Paternal Grandfather    3  Family history of Acute Myocardial Infarction (V17 3)    Social History    · Being A Social Drinker   · Cigars (___ A Day) (305 1)   · Daily Coffee Consumption (3  Cups/Day)   · Never a smoker    Current Meds   1  Aspirin 81 MG CAPS; Therapy: (Recorded:02Nov2016) to Recorded   2  Ergocalciferol 73749 UNIT CAPS; Therapy: (Recorded:68Rfu3768) to Recorded   3  Ferrous Sulfate 325 (65 Fe) MG Oral Tablet; Therapy: (Recorded:84Sol9642) to Recorded   4  One Daily Mens TABS; Therapy: (Recorded:81Zbh4871) to Recorded   5  Pantoprazole Sodium 40 MG Oral Tablet Delayed Release; Therapy: (Recorded:67Php8284) to Recorded   6  PredniSONE 10 MG Oral Tablet; 5 tablets by mouth Daily or as directed;    Therapy: 75CZO4823 to (Last Rx:07Vhd2542)  Requested for: 48Oav4255 Ordered   7  Vitamin B-12 1000 MCG Oral Tablet; Therapy: (Recorded:72Otc0143) to Recorded    Allergies    1  No Known Drug Allergies    Vitals  Signs   Recorded: 23PJF5357 11:21AM   Heart Rate: 76  Respiration: 16  Systolic: 388  Diastolic: 84  Recorded: 38JYB3991 10:51AM   Temperature: 99 1 F  Height: 5 ft 8 in  Weight: 202 lb   BMI Calculated: 30 71  BSA Calculated: 2 05    Physical Exam    Constitutional   General appearance: No acute distress, well appearing and well nourished  Eyes   Conjunctiva and lids: No swelling, erythema, or discharge  Negative scleral icterus  Ears, Nose, Mouth, and Throat   Oropharynx: Normal with no erythema, edema, exudate or lesions  Mucous membranes moist    Pulmonary   Respiratory effort: No increased work of breathing or signs of respiratory distress  Auscultation of lungs: Clear to auscultation, equal breath sounds bilaterally, no wheezes, no rales, no rhonci  Cardiovascular   Auscultation of heart: Normal rate and rhythm, normal S1 and S2, without murmurs  Examination of extremities for edema and/or varicosities: Normal     Abdomen   Abdomen: Non-tender, no masses  Lymphatic   Palpation of lymph nodes in neck: No lymphadenopathy  Musculoskeletal   Gait and station: Normal     Inspection/palpation of joints, bones, and muscles: Normal     Skin   Skin and subcutaneous tissue: Normal without rashes or lesions      Psychiatric   Orientation to person, place and time: Normal     Mood and affect: Normal          Future Appointments    Date/Time Provider Specialty Site   02/21/2017 09:45 AM SHADY Karimi , , [facility] Hematology Oncology [facility]     Signatures   Electronically signed by : Jessica Mancini DO; Feb 14 2017 11:33AM EST                       (Author)

## 2018-01-16 NOTE — MISCELLANEOUS
Assessment    1  Pulmonary embolism (415 19) (I26 99)   2  Hemolytic anemia (283 9) (D58 9)    Discussion/Summary  Discussion Summary:   Patient to continue present treatment  Discussed treatment options for anxiety and difficulty sleeping  Patient declines antianxiety medication and will try Benadryl daily at bedtime when necessary  Patient encouraged to drink plenty of fluids and rest  Patient to follow-up with Dr Maude Bundy, hematologist in 5 weeks as scheduled and return to the office when necessary  Medication SE Review and Pt Understands Tx: Possible side effects of new medications were reviewed with the patient/guardian today  The treatment plan was reviewed with the patient/guardian  The patient/guardian understands and agrees with the treatment plan      History of Present Illness  TCM Communication [facility]: The patient is being contacted for follow-up after hospitalization and [facility] records were reviewed  He was hospitalized at [facility]  The dates of hospitalization: June 6, 2017 through June 9, 2017, date of admission: June 6, 2017, date of discharge: June 9, 2017  Diagnosis: Acute Pulmonary Embolism  He was discharged to home  Medications were not reviewed today  He scheduled a follow up appointment  Follow-up appointments with other specialists: Dr Maude Bundy, hem/oncol 6/14/17 and 5 weeks  Symptoms: fatigue, but no fever, no weakness, no dizziness, no headache, no cough, no shortness of breath, no chest pain, no back pain on left side, no back pain on right side, no arm pain left side, no arm pain on right side, no leg pain on left side, no leg pain on right side, no upper abdominal pain, no middle abdominal pain, no lower abdominal pain, no rash:, no anorexia, no nausea, no vomiting, no loose stools, no constipation, no pain with urinating, no incisional pain, no wound drainage and no swelling  The patient is currently experiencing symptoms   Counseling was provided to the patient  Topics counseled included importance of compliance with treatment  Communication performed and completed by Ankita Freeman   HPI: Patient is being seen in follow-up from recent hospitalization for pulmonary embolism status post splenectomy  Patient was admitted to [facility]  from 6/6/17 until 6/9/17 and discharged home on Xarelto and high-dose prednisone  Patient saw Dr Asenath Claude, hematologist yesterday and had his prednisone tapered down  Patient admits to increased anxiousness and some difficulty sleeping otherwise feeling well overall  Appetite remains good  Patient has follow-up appointment with Dr Asenath Claude in 5 weeks  Active Problems     1  Calf pain (729 5) (M79 669)   2  History of allergy (V15 09) (Z88 9)   3  Shortness of breath (786 05) (R06 02)   4  Splenomegaly (789 2) (R16 1)   5  Strain of lumbar region, initial encounter (847 2) (S39 012A)    Hemolytic anemia (283 9) (D58 9)          Past Medical History    1  History of Dyspnea on exertion (786 09) (R06 09)   2  History of Encounter for pre-employment examination (V70 5) (Z02 1)   3  History of acute bronchitis (V12 69) (Z87 09)   4  History of acute pharyngitis (V12 69) (Z87 09)   5  History of acute sinusitis (V12 69) (Z87 09)   6  History of jaundice (V12 29) (Z87 898)   7  History of Preoperative examination (V72 84) (G32 528)    Surgical History    1  History of Arthroscopy Knee Right   2  History of Arthroscopy Shoulder Left   3  History of Elbow Arthroplasty   4  History of Shoulder Surgery    Family History  Mother    1  Family history of Breast Cancer (V16 3)  Father    2  Family history of CABG  Paternal Grandfather    3  Family history of Acute Myocardial Infarction (V17 3)    Social History    · Being A Social Drinker   · Cigars (___ A Day) (305 1)   · Current occasional smoker (305 1) (Z72 0)   · Daily Coffee Consumption (3  Cups/Day)   · Never a smoker    Current Meds   1  Aspirin 81 MG CAPS;    Therapy: (Recorded:02Nov2016) to Recorded   2  Benadryl Allergy 25 MG Oral Tablet; TAKE 2 TABLETS (TOTAL OF 50 MG) ONE HOUR   BEFORE SCAN;   Therapy: 55ZCN2944 to (Last Rx:05Jun2017)  Requested for: 07Jun2017 Ordered   3  Ergocalciferol 53048 UNIT CAPS; Therapy: (Recorded:02Ucy2246) to Recorded   4  Ferrous Sulfate 325 (65 Fe) MG Oral Tablet; Therapy: (Recorded:61Mtr4786) to Recorded   5  One Daily Mens TABS; Therapy: (Recorded:15Gdo1604) to Recorded   6  Pantoprazole Sodium 40 MG Oral Tablet Delayed Release; Therapy: (Recorded:30Erz8760) to Recorded   7  PredniSONE 20 MG Oral Tablet; 3 po daily  Take with food  taper as directed; Therapy: (Recorded:15Jun2017) to  Requested for: 13ERT8629 Recorded   8  Vitamin B-12 1000 MCG Oral Tablet; Therapy: (Recorded:24Hsw4393) to Recorded   9  Xarelto 15 MG Oral Tablet; Take 1 tablet twice daily; Therapy: (Recorded:15Jun2017) to Recorded    Allergies    1  No Known Drug Allergies    Vitals  Signs   Recorded: 15Jun2017 08:54AM   Heart Rate: 72  Respiration: 16  Recorded: 42FOV9840 08:25AM   Temperature: 52 6 F  Systolic: 912  Diastolic: 72  Height: 5 ft 8 5 in  Weight: 187 lb   BMI Calculated: 28 02  BSA Calculated: 2    Physical Exam    Constitutional   General appearance: No acute distress, well appearing and well nourished  Eyes   Conjunctiva and lids: No swelling, erythema, or discharge  Ears, Nose, Mouth, and Throat   External inspection of ears and nose: Normal     Otoscopic examination: Tympanic membrance translucent with normal light reflex  Canals patent without erythema  Nasal mucosa, septum, and turbinates: Normal without edema or erythema  Oropharynx: Normal with no erythema, edema, exudate or lesions  Mucous membranes moist    Pulmonary   Respiratory effort: No increased work of breathing or signs of respiratory distress  Auscultation of lungs: Clear to auscultation, equal breath sounds bilaterally, no wheezes, no rales, no rhonci  Cardiovascular   Auscultation of heart: Normal rate and rhythm, normal S1 and S2, without murmurs  Examination of extremities for edema and/or varicosities: Normal     Carotid pulses: Normal     Abdomen   Abdomen: Non-tender, no masses  Lymphatic   Palpation of lymph nodes in neck: No lymphadenopathy  Musculoskeletal   Gait and station: Normal     Inspection/palpation of joints, bones, and muscles: Normal     Skin   Skin and subcutaneous tissue: Normal without rashes or lesions      Psychiatric   Orientation to person, place and time: Normal     Mood and affect: Normal          Future Appointments    Date/Time Provider Specialty Site   07/18/2017 08:45 AM SHADY Ontiveros , DO, [facility] On[facility]     Signatures   Electronically signed by : Keyla Garcia DO; Asaf 15 2017  9:01AM EST                       (Author)

## 2018-01-16 NOTE — MISCELLANEOUS
Message   Recorded as Task   Date: 11/21/2017 10:38 AM, Created By: Paulo Donovan   Task Name: Call Back   Assigned To: Patrica Kebede   Regarding Patient: Carol Starr, Status: Active   CommentEstelita Zoltan - 21 Nov 2017 10:38 AM     TASK CREATED  Pt called wanting a call back regarding his hemoglobin result  Pt stated that he's concerend about it dropping one point in a week and wanted to know if he should drop the dose of his Prednisone  Call back number is [phone]  Reviewed with patient hgb = 9 5  He will continue on current prednisone dose and schedule  re check CBC in one week  He is asymptomatic at the moment  If this would change - he will go for his CBC sooner      Active Problems    1  Acute bronchitis (466 0) (J20 9)   2  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)   3  Bilateral calf pain (729 5) (M79 661,M79 662)   4  Calf pain (729 5) (M79 669)   5  History of allergy (V15 09) (Z88 9)   6  Pulmonary embolism (415 19) (I26 99)   7  Shortness of breath (786 05) (R06 02)   8  Splenomegaly (789 2) (R16 1)   9  Strain of lumbar region, initial encounter (847 2) (S39 012A)    Current Meds   1  Aspirin 81 MG CAPS; Therapy: (Recorded:02Nov2016) to Recorded   2  Calcium TABS; Therapy: (Recorded:20Nov2017) to Recorded   3  Ergocalciferol 86093 UNIT CAPS; Therapy: (Recorded:90Vjz7936) to Recorded   4  Ferrous Sulfate 325 (65 Fe) MG Oral Tablet; Therapy: (Recorded:46Mdx5424) to Recorded   5  One Daily Mens TABS; Therapy: (Recorded:17Mrw6940) to Recorded   6  Pantoprazole Sodium 40 MG Oral Tablet Delayed Release; Therapy: (Recorded:02Inp1384) to Recorded   7  PredniSONE 2 5 MG Oral Tablet; take as directed; Therapy: 52CPN4085 to (Evaluate:98Gie1048)  Requested for: 82HBL6834; Last   Rx:20Nov2017 Ordered   8  PredniSONE 20 MG Oral Tablet; TAKE 1 TABLET DAILY; Therapy: (Keyon White) to Recorded   9  Vitamin B-12 1000 MCG Oral Tablet;    Therapy: (Recorded:63Yom9236) to Recorded   10  Xarelto 20 MG Oral Tablet; 1 by mouth twice daily; Therapy: (Recorded:20Nov2017) to Recorded    Allergies    1   No Known Drug Allergies    Signatures   Electronically signed by : Modesto Tracy, ; Nov 21 2017  1:05PM EST                       (Author)

## 2018-01-16 NOTE — MISCELLANEOUS
Message   Recorded as Task   Date: 10/30/2017 01:56 PM, Created By: Sena Mirza   Task Name: Call Back   Assigned To: Patrica Kebede   Regarding Patient: Annelise Huang, Status: Active   Comment:    MARTIN HEATH - 30 Oct 2017 1:56 PM     TASK CREATED  Caller: Self; Care Coordination; [phone] (Home)  PT called requesting a call back  He states his hemoglobin today was 10 6 and he would like to know if he needs to make any changes with his Prednisone  Patient will reduce prednisone to 10mg PO daily from 15mg  Re check CBC in one week      Active Problems    1  Acute bronchitis (466 0) (J20 9)   2  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)   3  Bilateral calf pain (729 5) (M79 661,M79 662)   4  Calf pain (729 5) (M79 669)   5  History of allergy (V15 09) (Z88 9)   6  Pulmonary embolism (415 19) (I26 99)   7  Shortness of breath (786 05) (R06 02)   8  Splenomegaly (789 2) (R16 1)   9  Strain of lumbar region, initial encounter (847 2) (S39 012A)    Current Meds   1  Amoxicillin-Pot Clavulanate 875-125 MG Oral Tablet; TAKE 1 TABLET EVERY 12   HOURS DAILY; Therapy: 49GZJ9829 to (77 873 135)  Requested for: 81KYA7443; Last   Rx:17Oct2017 Ordered   2  Aspirin 81 MG CAPS; Therapy: (Recorded:02Nov2016) to Recorded   3  Ergocalciferol 41886 UNIT CAPS; Therapy: (Recorded:52Moc9434) to Recorded   4  Ferrous Sulfate 325 (65 Fe) MG Oral Tablet; Therapy: (Recorded:72Umd8409) to Recorded   5  One Daily Mens TABS; Therapy: (Recorded:19Byg6340) to Recorded   6  Pantoprazole Sodium 40 MG Oral Tablet Delayed Release; Therapy: (Recorded:89Bgv7226) to Recorded   7  PredniSONE 20 MG Oral Tablet; TAKE 1 TABLET DAILY; Therapy: (Jalloh Relic) to Recorded   8  Vitamin B-12 1000 MCG Oral Tablet; Therapy: (Recorded:69Qwz9690) to Recorded    Allergies    1   No Known Drug Allergies    Signatures   Electronically signed by : Marilia Hall, ; Oct 30 2017  2:47PM EST                       (Author)

## 2018-01-17 ENCOUNTER — GENERIC CONVERSION - ENCOUNTER (OUTPATIENT)
Dept: OTHER | Facility: OTHER | Age: 63
End: 2018-01-17

## 2018-01-17 ENCOUNTER — LAB (OUTPATIENT)
Dept: LAB | Facility: MEDICAL CENTER | Age: 63
End: 2018-01-17
Payer: COMMERCIAL

## 2018-01-17 ENCOUNTER — TRANSCRIBE ORDERS (OUTPATIENT)
Dept: ADMINISTRATIVE | Facility: HOSPITAL | Age: 63
End: 2018-01-17

## 2018-01-17 DIAGNOSIS — D59.19 OTHER AUTOIMMUNE HEMOLYTIC ANEMIAS: ICD-10-CM

## 2018-01-17 DIAGNOSIS — D59.10 ANEMIA, AUTOIMMUNE HEMOLYTIC (HCC): Primary | ICD-10-CM

## 2018-01-17 LAB
ALBUMIN SERPL BCP-MCNC: 3.7 G/DL (ref 3.5–5)
ALP SERPL-CCNC: 66 U/L (ref 46–116)
ALT SERPL W P-5'-P-CCNC: 46 U/L (ref 12–78)
ANION GAP SERPL CALCULATED.3IONS-SCNC: 3 MMOL/L (ref 4–13)
AST SERPL W P-5'-P-CCNC: 22 U/L (ref 5–45)
BILIRUB SERPL-MCNC: 2.92 MG/DL (ref 0.2–1)
BUN SERPL-MCNC: 21 MG/DL (ref 5–25)
CALCIUM SERPL-MCNC: 8.8 MG/DL (ref 8.3–10.1)
CHLORIDE SERPL-SCNC: 109 MMOL/L (ref 100–108)
CO2 SERPL-SCNC: 30 MMOL/L (ref 21–32)
CREAT SERPL-MCNC: 1 MG/DL (ref 0.6–1.3)
ERYTHROCYTE [DISTWIDTH] IN BLOOD BY AUTOMATED COUNT: 18.3 % (ref 11.6–15.1)
GFR SERPL CREATININE-BSD FRML MDRD: 80 ML/MIN/1.73SQ M
GLUCOSE P FAST SERPL-MCNC: 163 MG/DL (ref 65–99)
HCT VFR BLD AUTO: 34.1 % (ref 36.5–49.3)
HGB BLD-MCNC: 11.1 G/DL (ref 12–17)
MCH RBC QN AUTO: 37.8 PG (ref 26.8–34.3)
MCHC RBC AUTO-ENTMCNC: 32.6 G/DL (ref 31.4–37.4)
MCV RBC AUTO: 116 FL (ref 82–98)
PLATELET # BLD AUTO: 581 THOUSANDS/UL (ref 149–390)
PMV BLD AUTO: 9.9 FL (ref 8.9–12.7)
POTASSIUM SERPL-SCNC: 3.5 MMOL/L (ref 3.5–5.3)
PROT SERPL-MCNC: 6 G/DL (ref 6.4–8.2)
RBC # BLD AUTO: 2.94 MILLION/UL (ref 3.88–5.62)
SODIUM SERPL-SCNC: 142 MMOL/L (ref 136–145)
WBC # BLD AUTO: 10.12 THOUSAND/UL (ref 4.31–10.16)

## 2018-01-17 PROCEDURE — 36415 COLL VENOUS BLD VENIPUNCTURE: CPT

## 2018-01-17 PROCEDURE — 85027 COMPLETE CBC AUTOMATED: CPT

## 2018-01-17 PROCEDURE — 80053 COMPREHEN METABOLIC PANEL: CPT

## 2018-01-17 NOTE — MISCELLANEOUS
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)   2  Calf pain (729 5) (M79 669)   3  History of allergy (V15 09) (Z88 9)   4  Pulmonary embolism (415 19) (I26 99)   5  Shortness of breath (786 05) (R06 02)   6  Splenomegaly (789 2) (R16 1)   7  Strain of lumbar region, initial encounter (847 2) (S39 012A)    Current Meds   1  Aspirin 81 MG CAPS; Therapy: (Recorded:02Nov2016) to Recorded   2  Ergocalciferol 43966 UNIT CAPS; Therapy: (Recorded:20Hzh2266) to Recorded   3  Ferrous Sulfate 325 (65 Fe) MG Oral Tablet; Therapy: (Recorded:48Qld3702) to Recorded   4  One Daily Mens TABS; Therapy: (Recorded:60Gzj3574) to Recorded   5  Pantoprazole Sodium 40 MG Oral Tablet Delayed Release; Therapy: (Recorded:15Bhi0339) to Recorded   6  PredniSONE 5 MG Oral Tablet; 1 tablet daily; Therapy: (Recorded:51Hmr3135) to Recorded   7  Vitamin B-12 1000 MCG Oral Tablet; Therapy: (Recorded:48Mqb9182) to Recorded   8  Xarelto 10 MG Oral Tablet; One tablet po daily; Therapy: (Recorded:55Bkq1607) to Recorded    Allergies    1   No Known Drug Allergies    Signatures   Electronically signed by : Bettina Porter, ; Aug  7 2017  4:12PM EST                       (Author)

## 2018-01-17 NOTE — MISCELLANEOUS
Active Problems    1  Acute bronchitis (466 0) (J20 9)   2  Acute pharyngitis (462) (J02 9)   3  Acute sinusitis (461 9) (J01 90)   4  Dyspnea on exertion (786 09) (R06 09)   5  Encounter for pre-employment examination (V70 5) (Z02 1)   6  Hemolytic anemia (283 9) (D58 9)   7  History of allergy (V15 09) (Z88 9)   8  Jaundice (782 4) (R17)   9  Strain of lumbar region, initial encounter (847 2) (S39 012A)    Current Meds   1  Aspirin 81 MG CAPS; Therapy: (Recorded:23Ydy6731) to Recorded   2  Ergocalciferol 61718 UNIT CAPS; Therapy: (Recorded:46Ruy8280) to Recorded   3  Ferrous Sulfate 325 (65 Fe) MG Oral Tablet; Therapy: (Recorded:26Yhl7393) to Recorded   4  One Daily Mens TABS; Therapy: (Recorded:95Hlp0613) to Recorded   5  Pantoprazole Sodium 40 MG Oral Tablet Delayed Release; Therapy: (Recorded:89Sgk9545) to Recorded   6  PredniSONE 10 MG Oral Tablet; 1 TAB 3 TIMES DAILY; Therapy: (Recorded:16Rcj2054) to Recorded   7  Vitamin B-12 1000 MCG Oral Tablet; Therapy: (Recorded:92Wgl4195) to Recorded    Allergies    1   No Known Drug Allergies    Signatures   Electronically signed by : Clement Wise, ; Apr 24 2017  4:03PM EST                       (Author)

## 2018-01-17 NOTE — MISCELLANEOUS
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)   3  Bilateral calf pain (729 5) (M79 661,M79 662)   4  Calf pain (729 5) (M79 669)   5  History of allergy (V15 09) (Z88 9)   6  Pulmonary embolism (415 19) (I26 99)   7  Shortness of breath (786 05) (R06 02)   8  Splenomegaly (789 2) (R16 1)   9  Strain of lumbar region, initial encounter (847 2) (S39 012A)    Current Meds   1  Aspirin 81 MG CAPS; Therapy: (Recorded:39Emq1107) to Recorded   2  Ergocalciferol 46032 UNIT CAPS; Therapy: (Recorded:75Tlb3686) to Recorded   3  Ferrous Sulfate 325 (65 Fe) MG Oral Tablet; Therapy: (Recorded:08Eoc0172) to Recorded   4  One Daily Mens TABS; Therapy: (Recorded:57Bai2034) to Recorded   5  Pantoprazole Sodium 40 MG Oral Tablet Delayed Release; Therapy: (Recorded:86Otu8714) to Recorded   6  PredniSONE 20 MG Oral Tablet; TAKE 1 TABLET DAILY; Therapy: (Nicole Ovalle) to Recorded   7  Vitamin B-12 1000 MCG Oral Tablet; Therapy: (Recorded:85Bbp2806) to Recorded   8  Zithromax Z-Pablo 250 MG Oral Tablet (Azithromycin); TAKE 2 TABLETS ON DAY 1 THEN   TAKE 1 TABLET A DAY FOR 4 DAYS; Therapy: 28Sqb0863 to (Last Rx:39Okr9014)  Requested for: 99Dik3299 Ordered    Allergies    1   No Known Drug Allergies    Signatures   Electronically signed by : Telma May, ; Sep 12 2017  1:43PM EST                       (Author)

## 2018-01-17 NOTE — MISCELLANEOUS
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)   2  Calf pain (729 5) (M79 669)   3  History of allergy (V15 09) (Z88 9)   4  Pulmonary embolism (415 19) (I26 99)   5  Shortness of breath (786 05) (R06 02)   6  Splenomegaly (789 2) (R16 1)   7  Strain of lumbar region, initial encounter (847 2) (S39 012A)    Current Meds   1  Aspirin 81 MG CAPS; Therapy: (Recorded:02Nov2016) to Recorded   2  Ergocalciferol 66892 UNIT CAPS; Therapy: (Recorded:61Dmu1639) to Recorded   3  Ferrous Sulfate 325 (65 Fe) MG Oral Tablet; Therapy: (Recorded:10Uej4188) to Recorded   4  One Daily Mens TABS; Therapy: (Recorded:02Ckk8410) to Recorded   5  Pantoprazole Sodium 40 MG Oral Tablet Delayed Release; Therapy: (Recorded:42Adk3907) to Recorded   6  PredniSONE 5 MG Oral Tablet; 1 tablet daily; Therapy: (Recorded:41Vot4061) to Recorded   7  Vitamin B-12 1000 MCG Oral Tablet; Therapy: (Recorded:50Otn3986) to Recorded   8  Xarelto 10 MG Oral Tablet; One tablet po daily; Therapy: (Recorded:40Qdj5427) to Recorded    Allergies    1   No Known Drug Allergies    Signatures   Electronically signed by : Carmen Shha, ; Jul 25 2017  9:07AM EST                       (Author)

## 2018-01-17 NOTE — MISCELLANEOUS
Message   Recorded as Task   Date: 12/30/2016 11:09 AM, Created By: Leonila Cantor   Task Name: Call Back   Assigned To: Patrica Kebede   Regarding Patient: Judy Poag, Status: Active   Comment:    Kathy Patel - 30 Dec 2016 11:09 AM     TASK CREATED  pt was looking for his recent lab results and wondered if he can stop his medication [phone]   Labs reviewed with Patient - he will decrease prednisone to 5mg PO every other day and call next week to see if ok to DC prednisone      Active Problems    1  Acute bronchitis (466 0) (J20 9)   2  Acute pharyngitis (462) (J02 9)   3  Acute sinusitis (461 9) (J01 90)   4  Dyspnea on exertion (786 09) (R06 09)   5  Encounter for pre-employment examination (V70 5) (Z02 1)   6  Hemolytic anemia (283 9) (D58 9)   7  History of allergy (V15 09) (Z88 9)   8  Jaundice (782 4) (R17)    Current Meds   1  Aspirin 81 MG CAPS; Therapy: (Recorded:02Nov2016) to Recorded   2  Folic Acid 1 MG Oral Tablet; TAKE 1 TABLET DAILY; Therapy: 53GMU9302 to (Evaluate:58Hjt7414)  Requested for: 97CME3531; Last   Rx:18Nov2016 Ordered   3  One Daily Mens TABS; Therapy: (Recorded:95Zzd2105) to Recorded   4  PredniSONE 20 MG Oral Tablet; 1 tab daily or as directed Recorded    Allergies    1   No Known Drug Allergies    Signatures   Electronically signed by : Griselda Yang, ; Dec 30 2016 11:18AM EST                       (Author)

## 2018-01-17 NOTE — MISCELLANEOUS
Message   Recorded as Task   Date: 06/06/2017 10:31 AM, Created By: Racheal Barrera   Task Name: Call Back   Assigned To: Patrica Kebede   Regarding Patient: Mcdowell Na, Status: Active   Comment:    Christina Mir - 06 Jun 2017 10:31 AM     TASK CREATED  Caller: Self; Other; [phone] (Home); [phone] (Work)  pt states he is to call you today for lab results-please call home#   Iron panel was not drawn when patient went to labs yesterday  He will be going today - he will also have CBC repeated  Patient will call me tomorrow for all results  Active Problems    1  Calf pain (729 5) (M79 669)   2  Hemolytic anemia (283 9) (D58 9)   3  History of allergy (V15 09) (Z88 9)   4  Shortness of breath (786 05) (R06 02)   5  Splenomegaly (789 2) (R16 1)   6  Strain of lumbar region, initial encounter (847 2) (S39 012A)    Current Meds   1  Aspirin 81 MG CAPS; Therapy: (Recorded:02Nov2016) to Recorded   2  Benadryl Allergy 25 MG Oral Tablet; TAKE 2 TABLETS (TOTAL OF 50 MG) ONE HOUR   BEFORE SCAN;   Therapy: 19WVU1372 to (Last Rx:05Jun2017)  Requested for: 83KPU6561; Status:   ACTIVE - Retrospective By Protocol Authorization Ordered   3  Ergocalciferol 73305 UNIT CAPS; Therapy: (Recorded:42Glt6869) to Recorded   4  Ferrous Sulfate 325 (65 Fe) MG Oral Tablet; Therapy: (Recorded:91Lme5385) to Recorded   5  One Daily Mens TABS; Therapy: (Recorded:59Ssj4018) to Recorded   6  Pantoprazole Sodium 40 MG Oral Tablet Delayed Release; Therapy: (Recorded:86Dpk4900) to Recorded   7  PredniSONE 20 MG Oral Tablet; Therapy: (Whit Learn) to Recorded   8  Vitamin B-12 1000 MCG Oral Tablet; Therapy: (Recorded:15Xrc4687) to Recorded    Allergies    1   No Known Drug Allergies    Signatures   Electronically signed by : Betzaida Tyler, ; Jun 6 2017 11:12AM EST                       (Author)

## 2018-01-18 DIAGNOSIS — D59.19 OTHER AUTOIMMUNE HEMOLYTIC ANEMIAS: ICD-10-CM

## 2018-01-18 NOTE — MISCELLANEOUS
Message   Recorded as Task   Date: 01/06/2017 11:14 AM, Created By: April Weinberg   Task Name: Call Back   Assigned To: Patrica Kebede   Regarding Patient: Ernst Junior, Status: Active   Comment:    April Weinberg - 06 Jan 2017 11:14 AM     TASK CREATED  Caller: Self; [phone] (Mobile Phone)  Pt is calling for b/w results from the 4th  Reviewed with patient that ok to D/C prednisone - he will have labs repeated prior to seeing Dr Harjit Zacarias on 1/17/17      Active Problems    1  Acute bronchitis (466 0) (J20 9)   2  Acute pharyngitis (462) (J02 9)   3  Acute sinusitis (461 9) (J01 90)   4  Dyspnea on exertion (786 09) (R06 09)   5  Encounter for pre-employment examination (V70 5) (Z02 1)   6  Hemolytic anemia (283 9) (D58 9)   7  History of allergy (V15 09) (Z88 9)   8  Jaundice (782 4) (R17)    Current Meds   1  Aspirin 81 MG CAPS; Therapy: (Recorded:45Kxk4521) to Recorded   2  Folic Acid 1 MG Oral Tablet; TAKE 1 TABLET DAILY; Therapy: 51TIS1826 to (Evaluate:09Kqg1504)  Requested for: 50QVJ0084; Last   Rx:18Nov2016 Ordered   3  One Daily Mens TABS; Therapy: (Recorded:62Cjf8612) to Recorded   4  PredniSONE 20 MG Oral Tablet; 1 tab daily or as directed Recorded    Allergies    1   No Known Drug Allergies    Signatures   Electronically signed by : Angel Grant, ; Jan 6 2017 12:01PM EST                       (Author)

## 2018-01-18 NOTE — MISCELLANEOUS
Assessment    1  Hemolytic anemia (283 9) (D58 9)    Discussion/Summary  Discussion Summary:   Patient to continue present treatment  Patient encouraged to drink plenty of fluids and rest  Patient to follow-up with Dr Torito Clifton, hematologist next week and for labs next week as scheduled  Patient to return to the office when necessary  Medication SE Review and Pt Understands Tx: Possible side effects of new medications were reviewed with the patient/guardian today  The treatment plan was reviewed with the patient/guardian  The patient/guardian understands and agrees with the treatment plan      Chief Complaint  Chief Complaint Free Text Note Form: hospital follow up 11-3-16      History of Present Illness  TCM Communication [facility]: The patient is being contacted for follow-up after hospitalization  Hospital records were reviewed  He was hospitalized at [facility]  The date of admission: 11/2/16, date of discharge: 11/4/16, November 4, 2016  Diagnosis: Jaundice and Hemolytic anemia  He was discharged to home  Medications reviewed and updated today  He scheduled a follow up appointment  Follow-up appointments with other specialists: Dr Abran Rubi, Hematology/Oncology 11/18/16  Symptoms: no fever, no weakness, no dizziness, no headache, no fatigue, no cough, no shortness of breath, no chest pain, no back pain on left side, no back pain on right side, no arm pain left side, no arm pain on right side, no leg pain on left side, no leg pain on right side, no upper abdominal pain, no middle abdominal pain, no lower abdominal pain, no rash:, no anorexia, no nausea, no vomiting, no loose stools, no constipation, no pain with urinating and no swelling  The patient is currently experiencing symptoms  Counseling was provided to the patient  Topics counseled included importance of compliance with treatment     Communication performed and completed by Mary Sr   HPI: Patient is being seen in follow-up from recent hospitalization at [facility] from 11/2/16 until 11/4/16 for hemolytic anemia and jaundice  Patient received blood transfusion in the hospital  Patient was discharged home on high-dose prednisone and is feeling significantly better overall  Patient is due for follow-up labs next week and then follow-up appointment with Dr Flora Ashford, hematologist on 11/18/16  Active Problems    1  Acute bronchitis (466 0) (J20 9)   2  Acute pharyngitis (462) (J02 9)   3  Acute sinusitis (461 9) (J01 90)   4  Dyspnea on exertion (786 09) (R06 09)   5  Encounter for pre-employment examination (V70 5) (Z02 1)   6  History of allergy (V15 09) (Z88 9)   7  Jaundice (782 4) (R17)    Surgical History    1  History of Arthroscopy Knee Right   2  History of Arthroscopy Shoulder Left   3  History of Elbow Arthroplasty    Family History  Mother    1  Family history of Breast Cancer (V16 3)  Father    2  Family history of CABG  Paternal Grandfather    3  Family history of Acute Myocardial Infarction (V17 3)    Social History    · Being A Social Drinker   · Cigars (___ A Day) (305 1)   · Daily Coffee Consumption (3  Cups/Day)   · Never a smoker    Current Meds   1  Aspirin 81 MG CAPS; Therapy: (Recorded:02Nov2016) to Recorded   2  One Daily Mens TABS; Therapy: (Recorded:23Wox0486) to Recorded   3  PredniSONE 20 MG Oral Tablet; take 2 tablet twice daily; Therapy: (Recorded:10Nov2016) to Recorded    Allergies    1  No Known Drug Allergies    Vitals  Signs   Recorded: 59URS4562 74:90WT   Systolic: 080  Diastolic: 82  Heart Rate: 68  Respiration: 16  Recorded: 50HJS5057 11:32AM   Temperature: 98 9 F, Tympanic  Height: 5 ft 8 in  Weight: 191 lb   BMI Calculated: 29 04  BSA Calculated: 2    Physical Exam    Constitutional   General appearance: No acute distress, well appearing and well nourished  Eyes   Conjunctiva and lids: No swelling, erythema, or discharge      Ears, Nose, Mouth, and Throat   External inspection of ears and nose: Normal     Otoscopic examination: Tympanic membrance translucent with normal light reflex  Canals patent without erythema  Nasal mucosa, septum, and turbinates: Normal without edema or erythema  Oropharynx: Normal with no erythema, edema, exudate or lesions  Pulmonary   Respiratory effort: No increased work of breathing or signs of respiratory distress  Auscultation of lungs: Clear to auscultation, equal breath sounds bilaterally, no wheezes, no rales, no rhonci  Cardiovascular   Auscultation of heart: Normal rate and rhythm, normal S1 and S2, without murmurs  Examination of extremities for edema and/or varicosities: Normal     Abdomen   Abdomen: Non-tender, no masses  Lymphatic   Palpation of lymph nodes in neck: No lymphadenopathy  Musculoskeletal   Gait and station: Normal     Inspection/palpation of joints, bones, and muscles: Normal     Skin   Skin and subcutaneous tissue: Normal without rashes or lesions      Psychiatric   Orientation to person, place and time: Normal     Mood and affect: Normal          Future Appointments    Date/Time Provider Specialty Site   11/18/2016 10:00 [facility]     Signatures   Electronically signed by : Jaylene Bradley DO; Nov 10 2016 12:07PM EST                       (Author)

## 2018-01-18 NOTE — MISCELLANEOUS
Message   Recorded as Task   Date: 06/05/2017 10:17 AM, Created By: Gio Mayen   Task Name: Call Back   Assigned To: Patrica Kebede   Regarding Patient: Parminder Wellington, Status: Active   Comment:    Gio Mayen - 05 Jun 2017 10:17 AM     TASK CREATED  Caller: Self; Care Coordination; [phone] (Home)  PT called requesting a call back with results of the labs he had done on satuday  Some blood work still pending - patient will call me tomorrow for an update      Active Problems    1  Calf pain (729 5) (M79 669)   2  Hemolytic anemia (283 9) (D58 9)   3  History of allergy (V15 09) (Z88 9)   4  Shortness of breath (786 05) (R06 02)   5  Splenomegaly (789 2) (R16 1)   6  Strain of lumbar region, initial encounter (847 2) (S39 012A)    Current Meds   1  Aspirin 81 MG CAPS; Therapy: (Recorded:02Nov2016) to Recorded   2  Benadryl Allergy 25 MG Oral Tablet; TAKE 2 TABLETS (TOTAL OF 50 MG) ONE HOUR   BEFORE SCAN;   Therapy: 79XBX1457 to (Last Rx:05Jun2017)  Requested for: 13SQM8553; Status:   ACTIVE - Retrospective By Protocol Authorization Ordered   3  Ergocalciferol 87327 UNIT CAPS; Therapy: (Recorded:03Fyo9202) to Recorded   4  Ferrous Sulfate 325 (65 Fe) MG Oral Tablet; Therapy: (Recorded:28Qib4688) to Recorded   5  One Daily Mens TABS; Therapy: (Recorded:09Gkw6718) to Recorded   6  Pantoprazole Sodium 40 MG Oral Tablet Delayed Release; Therapy: (Recorded:10Zej1620) to Recorded   7  PredniSONE 20 MG Oral Tablet; Therapy: (Patito Leger) to Recorded   8  Vitamin B-12 1000 MCG Oral Tablet; Therapy: (Recorded:30Bme7410) to Recorded    Allergies    1   No Known Drug Allergies    Signatures   Electronically signed by : Preet Mitchell, ; Jun 5 2017 12:41PM EST                       (Author)

## 2018-01-19 ENCOUNTER — GENERIC CONVERSION - ENCOUNTER (OUTPATIENT)
Dept: OTHER | Facility: OTHER | Age: 63
End: 2018-01-19

## 2018-01-22 VITALS
BODY MASS INDEX: 27.7 KG/M2 | SYSTOLIC BLOOD PRESSURE: 120 MMHG | TEMPERATURE: 97.9 F | HEIGHT: 69 IN | DIASTOLIC BLOOD PRESSURE: 72 MMHG | WEIGHT: 187 LBS | HEART RATE: 72 BPM | RESPIRATION RATE: 16 BRPM

## 2018-01-22 VITALS
SYSTOLIC BLOOD PRESSURE: 142 MMHG | HEART RATE: 70 BPM | RESPIRATION RATE: 15 BRPM | OXYGEN SATURATION: 98 % | TEMPERATURE: 97.8 F | HEIGHT: 68 IN | BODY MASS INDEX: 27.94 KG/M2 | WEIGHT: 184.38 LBS | DIASTOLIC BLOOD PRESSURE: 76 MMHG

## 2018-01-22 VITALS
SYSTOLIC BLOOD PRESSURE: 138 MMHG | RESPIRATION RATE: 16 BRPM | HEART RATE: 76 BPM | BODY MASS INDEX: 30.62 KG/M2 | TEMPERATURE: 99.1 F | WEIGHT: 202 LBS | HEIGHT: 68 IN | DIASTOLIC BLOOD PRESSURE: 84 MMHG

## 2018-01-22 VITALS
HEIGHT: 68 IN | SYSTOLIC BLOOD PRESSURE: 138 MMHG | RESPIRATION RATE: 15 BRPM | WEIGHT: 190 LBS | BODY MASS INDEX: 28.79 KG/M2 | OXYGEN SATURATION: 97 % | HEART RATE: 87 BPM | DIASTOLIC BLOOD PRESSURE: 80 MMHG | TEMPERATURE: 98.2 F

## 2018-01-23 NOTE — MISCELLANEOUS
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)   3  Bilateral calf pain (729 5) (M79 661,M79 662)   4  Calf pain (729 5) (M79 669)   5  History of allergy (V15 09) (Z88 9)   6  Pulmonary embolism (415 19) (I26 99)   7  Shortness of breath (786 05) (R06 02)   8  Splenomegaly (789 2) (R16 1)   9  Strain of lumbar region, initial encounter (847 2) (S39 012A)    Current Meds   1  Aspirin 81 MG CAPS; Therapy: (Recorded:02Nov2016) to Recorded   2  Calcium TABS; Therapy: (Recorded:20Nov2017) to Recorded   3  Ergocalciferol 85014 UNIT CAPS; Therapy: (Recorded:31Xcn0305) to Recorded   4  Ferrous Sulfate 325 (65 Fe) MG Oral Tablet; Therapy: (Recorded:79Yer2611) to Recorded   5  One Daily Mens TABS; Therapy: (Recorded:25Nio3677) to Recorded   6  Pantoprazole Sodium 40 MG Oral Tablet Delayed Release; Therapy: (Recorded:67Szf1035) to Recorded   7  PredniSONE 2 5 MG Oral Tablet; take as directed; Therapy: 96XUM6181 to (Evaluate:23Fcc0002)  Requested for: 10QPM0010; Last   Rx:20Nov2017 Ordered   8  PredniSONE 20 MG Oral Tablet; TAKE 1 TABLET DAILY; Therapy: (Isabel Cure) to Recorded   9  Vitamin B-12 1000 MCG Oral Tablet; Therapy: (Recorded:24Mph5573) to Recorded   10  Xarelto 20 MG Oral Tablet; 1 by mouth twice daily; Therapy: (Recorded:20Nov2017) to Recorded    Allergies    1   No Known Drug Allergies    Signatures   Electronically signed by : Casey Barkley, ; Dec  4 2017  4:29PM EST                       (Author)

## 2018-01-23 NOTE — MISCELLANEOUS
Message  Return to work or school:   Chan Camejo is under my professional care  He was seen in my office on 1/4/2018   He is able to return to work on   1/8/2018            Signatures   Electronically signed by : Dami Teague, ; Jan 4 2018  9:42AM EST                       (Author)

## 2018-01-23 NOTE — MISCELLANEOUS
History of Present Illness  TCM Communication [facility]: The patient is being contacted for follow-up after hospitalization and [facility]  He was hospitalized at [facility]  The dates of hospitalization: December 21, 2017 through December 23, 2017, date of admission: December 21, 2017, date of discharge: December 23, 2017  Diagnosis: Acute Calculous Cholecystitis  He was discharged to home  Medications were not reviewed today  He did not schedule a follow up appointment  Follow-up appointments with other specialists: Ermelinda Graham will follow up with the surgeon  Counseling was provided to the patient  Topics counseled included importance of compliance with treatment  Communication performed and completed by      Active Problems    1  Acute bronchitis (466 0) (J20 9)   2  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)   3  Bilateral calf pain (729 5) (M79 661,M79 662)   4  Calf pain (729 5) (M79 669)   5  History of allergy (V15 09) (Z88 9)   6  Pulmonary embolism (415 19) (I26 99)   7  Shortness of breath (786 05) (R06 02)   8  Splenomegaly (789 2) (R16 1)   9  Strain of lumbar region, initial encounter (847 2) (Y65 864J)    Past Medical History    1  History of Dyspnea on exertion (786 09) (R06 09)   2  History of Encounter for pre-employment examination (V70 5) (Z02 1)   3  History of acute bronchitis (V12 69) (Z87 09)   4  History of acute pharyngitis (V12 69) (Z87 09)   5  History of acute sinusitis (V12 69) (Z87 09)   6  History of hemolytic anemia (V12 3) (Z86 2)   7  History of jaundice (V12 29) (Z87 898)   8  History of Preoperative examination (V72 84) (E59 865)    Surgical History    1  History of Arthroscopy Knee Right   2  History of Arthroscopy Shoulder Left   3  History of Elbow Arthroplasty   4  History of Shoulder Surgery    Family History  Mother    1  Family history of Breast Cancer (V16 3)  Father    2  Family history of CABG  Paternal Grandfather    3   Family history of Acute Myocardial Infarction (V17 3)    Social History    · Being A Social Drinker   · Cigars (___ A Day) (305 1)   · Current occasional smoker (305 1) (Z72 0)   · Daily Coffee Consumption (3  Cups/Day)   · Never a smoker    Current Meds   1  Aspirin 81 MG CAPS; Therapy: (Recorded:02Nov2016) to Recorded   2  Calcium TABS; Therapy: (Recorded:20Nov2017) to Recorded   3  Dexamethasone 4 MG Oral Tablet; Take 10 dablets by mouth daily x 4 days; Therapy: 16NZK3365 to (Last Rx:01Pyu4087)  Requested for: 14VKR0736 Ordered   4  Ergocalciferol 42492 UNIT CAPS; Therapy: (Recorded:63Jss5324) to Recorded   5  Ferrous Sulfate 325 (65 Fe) MG Oral Tablet; Therapy: (Recorded:58Ptv1422) to Recorded   6  One Daily Mens TABS; Therapy: (Recorded:51Cyu0018) to Recorded   7  Pantoprazole Sodium 40 MG Oral Tablet Delayed Release; TAKE 1 TABLET DAILY; Last   Rx:96Mlh9146 Ordered   8  PredniSONE 2 5 MG Oral Tablet; take as directed; Therapy: 78CQD1881 to (Evaluate:24Ldf2739)  Requested for: 09RTN4422; Last   Rx:20Nov2017 Ordered   9  PredniSONE 20 MG Oral Tablet; TAKE 1 TABLET DAILY; Therapy: (Destiney Amor) to Recorded   10  Vitamin B-12 1000 MCG Oral Tablet; Therapy: (Recorded:40Ckf3177) to Recorded   11  Xarelto 20 MG Oral Tablet; 1 by mouth twice daily; Therapy: (Recorded:20Nov2017) to Recorded   12  Xarelto 20 MG Oral Tablet; one tab po daily; Therapy: 40IZB8837 to (Last Rx:33Fgg8106)  Requested for: 10Pyg4596 Ordered    Allergies    1  No Known Drug Allergies    Message   Recorded as Task  Date: 12/23/2017 03:49 PM, Created By: System  Task Name: Hospital VENU  Assigned To: Shelley Trevizo  Regarding Patient: Keny Garcia, Status:  In Progress  Comment:   System - 23 Dec 2017 3:49 PM    Patient discharged from hospital   Patient Name: Roxy Carlson  Patient YOB: 1955  Discharge Date: 12/23/2017  [facility]ility[facility] - 26 Dec 2017 1:33 PM    TASK REASSIGNED: Previously Assigned To Jimenez Goel - 26 Dec 2017 1:45 PM    TASK IN PROGRESS  Mary Ku - 27 Dec 2017 3:40 PM    TASK EDITED  James Jimenez - 28 Dec 2017 10:54 AM    TASK EDITED  pt does not want to come in for a follow up     Future Appointments    Date/Time Provider Specialty Site   01/25/2018 02:40 PM SHADY Rubio , DO, [facility]ology Oncology [facility] [address] [place] [address]     Signatures   Electronically signed by : Abdirahman Huff DO; Dec 28 2017  1:54PM EST                       (Author)

## 2018-01-23 NOTE — MISCELLANEOUS
Message   Recorded as Task   Date: 12/13/2017 08:24 AM, Created By: Young Farrell   Task Name: Call Back   Assigned To: Patrica Kebede   Regarding Patient: Gael Mayer, Status: Active   Comment:    Chastity Del Cid - 13 Dec 2017 8:24 AM     TASK CREATED  Caller: Self; Other; [phone] (Home)  PT HAD HIS BLOOD DRAWN YESTERDAY AT [address]  SAYS HE NEEDS SET UP FOR A TRANSFUSION ASAP, HOPEFULLY TOMORROW? 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)   3  Bilateral calf pain (729 5) (M79 661,M79 662)   4  Calf pain (729 5) (M79 669)   5  History of allergy (V15 09) (Z88 9)   6  Pulmonary embolism (415 19) (I26 99)   7  Shortness of breath (786 05) (R06 02)   8  Splenomegaly (789 2) (R16 1)   9  Strain of lumbar region, initial encounter (847 2) (S39 012A)    Current Meds   1  Aspirin 81 MG CAPS; Therapy: (Recorded:02Nov2016) to Recorded   2  Calcium TABS; Therapy: (Recorded:20Nov2017) to Recorded   3  Ergocalciferol 02660 UNIT CAPS; Therapy: (Recorded:15Kgn9273) to Recorded   4  Ferrous Sulfate 325 (65 Fe) MG Oral Tablet; Therapy: (Recorded:67Gvu8006) to Recorded   5  One Daily Mens TABS; Therapy: (Recorded:85Qfv9047) to Recorded   6  Pantoprazole Sodium 40 MG Oral Tablet Delayed Release; Therapy: (Recorded:45Yus8532) to Recorded   7  PredniSONE 2 5 MG Oral Tablet; take as directed; Therapy: 90BOG6140 to (Evaluate:85Ean8595)  Requested for: 56JGE2804; Last   Rx:20Nov2017 Ordered   8  PredniSONE 20 MG Oral Tablet; TAKE 1 TABLET DAILY; Therapy: (Becki Hum) to Recorded   9  Vitamin B-12 1000 MCG Oral Tablet; Therapy: (Recorded:28Qkl0532) to Recorded   10  Xarelto 20 MG Oral Tablet; 1 by mouth twice daily; Therapy: (Recorded:20Nov2017) to Recorded    Allergies    1   No Known Drug Allergies    Signatures   Electronically signed by : Keron Singer, ; Dec 13 2017  9:16AM EST                       (Author)

## 2018-01-23 NOTE — MISCELLANEOUS
Message   Recorded as Task   Date: 01/17/2018 02:32 PM, Created By: Maria Dolores Bernal   Task Name: Call Back   Assigned To: Patrica Kebede   Regarding Patient: Carol Starr, Status: Active   Comment:    Chastity Del Cid - 17 Jan 2018 2:32 PM     TASK CREATED  Caller: Self; Other; [phone] (Home)  PT HAD BLOOD DRAWN THIS A M  AT  [facility] - WOULD LIKE TO GO OVER [facility]  WOULD LIKE TO REVIEW HIS MEDICATIONS  AND HE STATED HE IS GETTING HIGH BP READINGS /102 AS A SIDE EFFECT FROM HIS MEDICATION  PLEASE CALL, TXS   Labs reviewed with patient - He will decrease Prednisone from 30mg PO daily to 20mg PO daily  Danazole will be placed on hold due to hypertension  Patient reports highest BP reading was 203/120  He is having headaches  He will call me Friday with BP readings - checking 2-3 times daily  Active Problems    1  Acute bronchitis (466 0) (J20 9)   2  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)   3  Bilateral calf pain (729 5) (M79 661,M79 662)   4  Calf pain (729 5) (M79 669)   5  History of allergy (V15 09) (Z88 9)   6  Pulmonary embolism (415 19) (I26 99)   7  Shortness of breath (786 05) (R06 02)   8  Splenomegaly (789 2) (R16 1)   9  Strain of lumbar region, initial encounter (847 2) (S39 012A)    Current Meds   1  Aspirin 81 MG CAPS; Therapy: (Recorded:02Nov2016) to Recorded   2  Calcium TABS; Therapy: (Recorded:20Nov2017) to Recorded   3  CycloSPORINE 100 MG Oral Capsule; Take 1 pill by mouth each day; Therapy: 94TPF5411 to 01 33 43 04 02)  Requested for: 71IWE2711; Last   Rx:04Jan2018 Ordered   4  Danazol 100 MG Oral Capsule; take 1 capsule daily; Therapy: 75SHD7133 to 01 33 43 04 02)  Requested for: 79OWZ5896; Last   Rx:04Jan2018 Ordered   5  Dexamethasone 4 MG Oral Tablet; Take 10 dablets by mouth daily x 4 days; Therapy: 99FHZ5629 to (Last Rx:63Agc7714)  Requested for: 12TPJ4563 Ordered   6  Ergocalciferol 43123 UNIT CAPS; Therapy: (Recorded:42Bbr8981) to Recorded   7  Ferrous Sulfate 325 (65 Fe) MG Oral Tablet; Therapy: (Recorded:47Ury8814) to Recorded   8  One Daily Mens TABS; Therapy: (Recorded:28Xzz4042) to Recorded   9  Pantoprazole Sodium 40 MG Oral Tablet Delayed Release; TAKE 1 TABLET DAILY; Last   Rx:82Heh5279 Ordered   10  PredniSONE 2 5 MG Oral Tablet; take as directed; Therapy: 53UXG3518 to (Evaluate:23Wbm5591)  Requested for: 53ITX9856; Last    Rx:20Nov2017 Ordered   11  PredniSONE 20 MG Oral Tablet; TAKE 1 TABLET DAILY; Therapy: (Mireles Scot) to Recorded   12  Vitamin B-12 1000 MCG Oral Tablet; Therapy: (Recorded:73Ltd9255) to Recorded   13  Xarelto 20 MG Oral Tablet; 1 by mouth twice daily; Therapy: (Recorded:20Nov2017) to Recorded   14  Xarelto 20 MG Oral Tablet; one tab po daily; Therapy: 85IVA7605 to (Last Rx:47Jfr7062)  Requested for: 14Vpi6734 Ordered    Allergies    1   No Known Drug Allergies    Signatures   Electronically signed by : Tyrel Dillard, ; Jan 17 2018  3:36PM EST                       (Author)

## 2018-01-23 NOTE — MISCELLANEOUS
Message   Recorded as Task   Date: 01/19/2018 01:44 PM, Created By: Andrea Reyes   Task Name: Call Back   Assigned To: Patrica Kebede   Regarding Patient: Terese Prather, Status: Active   CommentTrudanna Ellis - 19 Jan 2018 1:44 PM     TASK CREATED  Caller: Self; Other; [phone] (Home)  Would like to review blood pressure from the last several days  Spoke with patient today - blood pressure has been slightly lower since holding Danazole  he will continue to hold, check over the weekend and call me Monday with update    lowest reading today is 175/89      Active Problems    1  Acute bronchitis (466 0) (J20 9)   2  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)   3  Bilateral calf pain (729 5) (M79 661,M79 662)   4  Calf pain (729 5) (M79 669)   5  History of allergy (V15 09) (Z88 9)   6  Pulmonary embolism (415 19) (I26 99)   7  Shortness of breath (786 05) (R06 02)   8  Splenomegaly (789 2) (R16 1)   9  Strain of lumbar region, initial encounter (847 2) (S39 012A)    Current Meds   1  Aspirin 81 MG CAPS; Therapy: (Recorded:02Nov2016) to Recorded   2  Calcium TABS; Therapy: (Recorded:20Nov2017) to Recorded   3  CycloSPORINE 100 MG Oral Capsule; Take 1 pill by mouth each day; Therapy: 45OVS2134 to 01 33 43 04 02)  Requested for: 42DDC6417; Last   Rx:04Jan2018 Ordered   4  Danazol 100 MG Oral Capsule; take 1 capsule daily; Therapy: 58OMA7893 to 01 33 43 04 02)  Requested for: 71RQD9672; Last   Rx:04Jan2018 Ordered   5  Dexamethasone 4 MG Oral Tablet; Take 10 dablets by mouth daily x 4 days; Therapy: 14EDY0558 to (Last Rx:75Kph4431)  Requested for: 94QAV1694 Ordered   6  Ergocalciferol 58446 UNIT CAPS; Therapy: (Recorded:78Imy5792) to Recorded   7  Ferrous Sulfate 325 (65 Fe) MG Oral Tablet; Therapy: (Recorded:77Zlq8903) to Recorded   8  One Daily Mens TABS; Therapy: (Recorded:26Hih2590) to Recorded   9   Pantoprazole Sodium 40 MG Oral Tablet Delayed Release; TAKE 1 TABLET DAILY; Last   Rx:58Mxr5698 Ordered   10  PredniSONE 2 5 MG Oral Tablet; take as directed; Therapy: 65ENS5738 to (Evaluate:87Scb5525)  Requested for: 38HTA9306; Last    Rx:20Nov2017 Ordered   11  PredniSONE 20 MG Oral Tablet; TAKE 1 TABLET DAILY; Therapy: (Edwardo Womack) to Recorded   12  Vitamin B-12 1000 MCG Oral Tablet; Therapy: (Recorded:83Yod9196) to Recorded   13  Xarelto 20 MG Oral Tablet; 1 by mouth twice daily; Therapy: (Recorded:20Nov2017) to Recorded   14  Xarelto 20 MG Oral Tablet; one tab po daily; Therapy: 15GVR2476 to (Last Rx:79Oqw0409)  Requested for: 19Iet2474 Ordered    Allergies    1   No Known Drug Allergies    Signatures   Electronically signed by : Ap Keys, ; Jan 19 2018  2:29PM EST                       (Author)

## 2018-01-23 NOTE — MISCELLANEOUS
Message   Recorded as Task   Date: 12/27/2017 03:48 PM, Created By: Jose Cruz   Task Name: Call Back   Assigned To: Patrica Kebede   Regarding Patient: Radha Martinez, Status: Active   Comment:    Chastity Del Cid - 27 Dec 2017 3:48 PM     TASK CREATED  Caller: Self; Results Inquiry; [phone] (Home)  PT HAD A BMBX DONE 10 DAYS AGO AT [facility] AS AN OUT-PATIENT  CALLING FOR RESULTS, TXS   Patient will continue with current prednisone dose - 30mg    Dr Fabrizio Vincent to call patient to review bone marrow biopsy results      Active Problems    1  Acute bronchitis (466 0) (J20 9)   2  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)   3  Bilateral calf pain (729 5) (M79 661,M79 662)   4  Calf pain (729 5) (M79 669)   5  History of allergy (V15 09) (Z88 9)   6  Pulmonary embolism (415 19) (I26 99)   7  Shortness of breath (786 05) (R06 02)   8  Splenomegaly (789 2) (R16 1)   9  Strain of lumbar region, initial encounter (847 2) (S39 012A)    Current Meds   1  Aspirin 81 MG CAPS; Therapy: (Recorded:02Nov2016) to Recorded   2  Calcium TABS; Therapy: (Recorded:20Nov2017) to Recorded   3  Dexamethasone 4 MG Oral Tablet; Take 10 dablets by mouth daily x 4 days; Therapy: 53JEN6532 to (Last Rx:06Xee2279)  Requested for: 44IBB0016 Ordered   4  Ergocalciferol 67666 UNIT CAPS; Therapy: (Recorded:04Bbu4998) to Recorded   5  Ferrous Sulfate 325 (65 Fe) MG Oral Tablet; Therapy: (Recorded:26Wwl5152) to Recorded   6  One Daily Mens TABS; Therapy: (Recorded:40Smo7266) to Recorded   7  Pantoprazole Sodium 40 MG Oral Tablet Delayed Release; TAKE 1 TABLET DAILY; Last   Rx:92Ebn0592 Ordered   8  PredniSONE 2 5 MG Oral Tablet; take as directed; Therapy: 99OIU2105 to (Evaluate:53Xtp3578)  Requested for: 54AKB6275; Last   Rx:20Nov2017 Ordered   9  PredniSONE 20 MG Oral Tablet; TAKE 1 TABLET DAILY; Therapy: (Layman Shearing) to Recorded   10  Vitamin B-12 1000 MCG Oral Tablet; Therapy: (Recorded:87Jwt5420) to Recorded   11  Xarelto 20 MG Oral Tablet; 1 by mouth twice daily; Therapy: (Recorded:33Git9133) to Recorded   12  Xarelto 20 MG Oral Tablet; one tab po daily; Therapy: 22NSD9987 to (Last Rx:38Egi8697)  Requested for: 83Wwf6752 Ordered    Allergies    1   No Known Drug Allergies    Signatures   Electronically signed by : Janelle Hensley, ; Dec 27 2017  4:11PM EST                       (Author)

## 2018-01-23 NOTE — MISCELLANEOUS
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)   3  Bilateral calf pain (729 5) (M79 661,M79 662)   4  Calf pain (729 5) (M79 669)   5  History of allergy (V15 09) (Z88 9)   6  Pulmonary embolism (415 19) (I26 99)   7  Shortness of breath (786 05) (R06 02)   8  Splenomegaly (789 2) (R16 1)   9  Strain of lumbar region, initial encounter (847 2) (S39 012A)    Current Meds   1  Aspirin 81 MG CAPS; Therapy: (Recorded:02Nov2016) to Recorded   2  Calcium TABS; Therapy: (Recorded:20Nov2017) to Recorded   3  Dexamethasone 4 MG Oral Tablet; Take 10 dablets by mouth daily x 4 days; Therapy: 24YQS6117 to (Last Rx:59Chj3389)  Requested for: 37ANP7348 Ordered   4  Ergocalciferol 08650 UNIT CAPS; Therapy: (Recorded:95Qae7082) to Recorded   5  Ferrous Sulfate 325 (65 Fe) MG Oral Tablet; Therapy: (Recorded:38Kml9157) to Recorded   6  One Daily Mens TABS; Therapy: (Recorded:42Mla1494) to Recorded   7  Pantoprazole Sodium 40 MG Oral Tablet Delayed Release; TAKE 1 TABLET DAILY; Last   Rx:01Llc4969 Ordered   8  PredniSONE 2 5 MG Oral Tablet; take as directed; Therapy: 49YNH3912 to (Evaluate:73Adk5172)  Requested for: 19JJL6885; Last   Rx:20Nov2017 Ordered   9  PredniSONE 20 MG Oral Tablet; TAKE 1 TABLET DAILY; Therapy: (Taiwo Dunn) to Recorded   10  Vitamin B-12 1000 MCG Oral Tablet; Therapy: (Recorded:12Avr9842) to Recorded   11   Xarelto 20 MG Oral Tablet; 1 by mouth twice daily; Therapy: (Recorded:58Ibb6969) to Recorded   12  Xarelto 20 MG Oral Tablet; one tab po daily; Therapy: 89JSL2438 to (Last Rx:48Hub6554)  Requested for: 62Nit7164 Ordered    Allergies    1   No Known Drug Allergies    Signatures   Electronically signed by : Modesto Tracy, ; Dec 21 2017  1:51PM EST                       (Author)

## 2018-01-23 NOTE — MISCELLANEOUS
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)   3  Bilateral calf pain (729 5) (M79 661,M79 662)   4  Calf pain (729 5) (M79 669)   5  History of allergy (V15 09) (Z88 9)   6  Pulmonary embolism (415 19) (I26 99)   7  Shortness of breath (786 05) (R06 02)   8  Splenomegaly (789 2) (R16 1)   9  Strain of lumbar region, initial encounter (847 2) (S39 012A)    Current Meds   1  Aspirin 81 MG CAPS; Therapy: (Recorded:02Nov2016) to Recorded   2  Calcium TABS; Therapy: (Recorded:20Nov2017) to Recorded   3  Dexamethasone 4 MG Oral Tablet; Take 10 dablets by mouth daily x 4 days; Therapy: 50IFS7894 to (Last Rx:31Ksu6328)  Requested for: 39MFA5850; Status:   ACTIVE - Retrospective By Protocol Authorization Ordered   4  Ergocalciferol 80177 UNIT CAPS; Therapy: (Recorded:44Hfj7670) to Recorded   5  Ferrous Sulfate 325 (65 Fe) MG Oral Tablet; Therapy: (Recorded:32Vxx4195) to Recorded   6  One Daily Mens TABS; Therapy: (Recorded:75Gxx0142) to Recorded   7  Pantoprazole Sodium 40 MG Oral Tablet Delayed Release; Therapy: (Recorded:28Fkz2356) to Recorded   8  PredniSONE 2 5 MG Oral Tablet; take as directed; Therapy: 14VEG0306 to (Evaluate:41Ine8083)  Requested for: 43XPQ3979; Last   Rx:20Nov2017 Ordered   9  PredniSONE 20 MG Oral Tablet; TAKE 1 TABLET DAILY; Therapy: (Rubenan Pass) to Recorded   10  Vitamin B-12 1000 MCG Oral Tablet; Therapy: (Recorded:11Jae0136) to Recorded   11  Xarelto 20 MG Oral Tablet; 1 by mouth twice daily; Therapy: (Recorded:20Nov2017) to Recorded    Allergies    1   No Known Drug Allergies    Plan  Autoimmune hemolytic anemia    · *1- [facility]-Management  * FOR BONE MARROW  BIOPSY *  Status: Active - Retrospective By Protocol Authorization  Requested  for: [id number]  Care Summary provided  Jw Yung signed by : Meera Artis, ; Dec 14 2017 11:53AM EST                       (Author)

## 2018-01-23 NOTE — MISCELLANEOUS
Message   Recorded as Task   Date: 12/12/2017 02:22 PM, Created By: Martha Hart   Task Name: Call Back   Assigned To: Patrica Kebede   Regarding Patient: Luz Carlso, Status: Active   Comment:    Zeferino Felipe - 12 Dec 2017 2:22 PM     TASK CREATED  Caller: Self; Results Inquiry; [phone] (Home)  stated that his hemoglobin is going down & req that you call him back to see what they are going to do with his dosage of the prednisone  Call back at [phone] with patient today - he will be calling me back tomorrow with an update on when he would like to set up blood transfusion  He will increase his PO steroids by 10mg to 40mg PO daily  Patient increased to 30mg PO daily on his own last week and still had a decrease  We discussed that he has reached the point where chemotherapy would be appropriate  We will discuss this further tomorrow when he calls      Active Problems    1  Acute bronchitis (466 0) (J20 9)   2  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)   3  Bilateral calf pain (729 5) (M79 661,M79 662)   4  Calf pain (729 5) (M79 669)   5  History of allergy (V15 09) (Z88 9)   6  Pulmonary embolism (415 19) (I26 99)   7  Shortness of breath (786 05) (R06 02)   8  Splenomegaly (789 2) (R16 1)   9  Strain of lumbar region, initial encounter (847 2) (S39 012A)    Current Meds   1  Aspirin 81 MG CAPS; Therapy: (Recorded:02Nov2016) to Recorded   2  Calcium TABS; Therapy: (Recorded:20Nov2017) to Recorded   3  Ergocalciferol 60663 UNIT CAPS; Therapy: (Recorded:47Nse0846) to Recorded   4  Ferrous Sulfate 325 (65 Fe) MG Oral Tablet; Therapy: (Recorded:89Znt5310) to Recorded   5  One Daily Mens TABS; Therapy: (Recorded:98Tto8435) to Recorded   6  Pantoprazole Sodium 40 MG Oral Tablet Delayed Release; Therapy: (Recorded:76Ese0203) to Recorded   7  PredniSONE 2 5 MG Oral Tablet; take as directed;    Therapy: 82CAX2467 to (Evaluate:99Oqf6311)  Requested for: 18VLG7306; Last   Rx:20Nov2017 Ordered   8  PredniSONE 20 MG Oral Tablet; TAKE 1 TABLET DAILY; Therapy: (Nhi Stern) to Recorded   9  Vitamin B-12 1000 MCG Oral Tablet; Therapy: (Recorded:49Ujq4233) to Recorded   10  Xarelto 20 MG Oral Tablet; 1 by mouth twice daily; Therapy: (Recorded:20Nov2017) to Recorded    Allergies    1   No Known Drug Allergies    Signatures   Electronically signed by : Kehinde Walter, ; Dec 12 2017  2:51PM EST                       (Author)

## 2018-01-23 NOTE — MISCELLANEOUS
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)   3  Bilateral calf pain (729 5) (M79 661,M79 662)   4  Calf pain (729 5) (M79 669)   5  History of allergy (V15 09) (Z88 9)   6  Pulmonary embolism (415 19) (I26 99)   7  Shortness of breath (786 05) (R06 02)   8  Splenomegaly (789 2) (R16 1)   9  Strain of lumbar region, initial encounter (847 2) (S39 012A)    Current Meds   1  Aspirin 81 MG CAPS; Therapy: (Recorded:02Nov2016) to Recorded   2  Calcium TABS; Therapy: (Recorded:20Nov2017) to Recorded   3  Ergocalciferol 99896 UNIT CAPS; Therapy: (Recorded:88Vze9965) to Recorded   4  Ferrous Sulfate 325 (65 Fe) MG Oral Tablet; Therapy: (Recorded:57Ldw8942) to Recorded   5  One Daily Mens TABS; Therapy: (Recorded:32Jrj6142) to Recorded   6  Pantoprazole Sodium 40 MG Oral Tablet Delayed Release; Therapy: (Recorded:88Uus3072) to Recorded   7  PredniSONE 2 5 MG Oral Tablet; take as directed; Therapy: 53HVY3060 to (Evaluate:60Wrc2041)  Requested for: 00GPF3002; Last   Rx:20Nov2017 Ordered   8  PredniSONE 20 MG Oral Tablet; TAKE 1 TABLET DAILY; Therapy: (Mary Barr) to Recorded   9  Vitamin B-12 1000 MCG Oral Tablet; Therapy: (Recorded:15Mnm9495) to Recorded   10  Xarelto 20 MG Oral Tablet; 1 by mouth twice daily; Therapy: (Recorded:20Nov2017) to Recorded    Allergies    1  No Known Drug Allergies    Plan  Autoimmune hemolytic anemia    · Dexamethasone 4 MG Oral Tablet;  Take 10 dablets by mouth daily x 4 days   · *1- [facility]-Management  * FOR BONE MARROW  BIOPSY *  Status: Hold For - Scheduling,Retrospective By Protocol Authorization   Requested for: 07QPL6328  Care Summary provided  : Yes    Signatures   Electronically signed by : Jose Henao, ; Dec 14 2017 11:06AM EST                       (Author)

## 2018-01-23 NOTE — MISCELLANEOUS
Message   Recorded as Task   Date: 12/18/2017 02:18 PM, Created By: Binta Forbes   Task Name: Call Back   Assigned To: Patrica Kebede   Regarding Patient: Satish Garduno, Status: Active   CommentSatish Yeboah - 18 Dec 2017 2:18 PM     TASK CREATED  Caller: Self; General Medical Question; [phone] (Home)  req that we call him back with the results of his hemoglobin  Results are still pending in Epic  Pt is aware  Pt req that you call him back to discuss his prescriptions and some questions that he has about tomorrow  Call back at [phone] with patient - he will be restarting prednisone tomorrow after decadron pulse dosing x 4 days  Prednisone dose will be tapered to 30mg PO daily  He is scheduled for bone marrow biopsy tomorrow  He will call next week for results of biopsy and lab work      Active Problems    1  Acute bronchitis (466 0) (J20 9)   2  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)   3  Bilateral calf pain (729 5) (M79 661,M79 662)   4  Calf pain (729 5) (M79 669)   5  History of allergy (V15 09) (Z88 9)   6  Pulmonary embolism (415 19) (I26 99)   7  Shortness of breath (786 05) (R06 02)   8  Splenomegaly (789 2) (R16 1)   9  Strain of lumbar region, initial encounter (847 2) (S39 012A)    Current Meds   1  Aspirin 81 MG CAPS; Therapy: (Recorded:02Nov2016) to Recorded   2  Calcium TABS; Therapy: (Recorded:20Nov2017) to Recorded   3  Dexamethasone 4 MG Oral Tablet; Take 10 dablets by mouth daily x 4 days; Therapy: 81URF6391 to (Last Rx:54Hab7692)  Requested for: 13ERL9807 Ordered   4  Ergocalciferol 18324 UNIT CAPS; Therapy: (Recorded:87Hve5016) to Recorded   5  Ferrous Sulfate 325 (65 Fe) MG Oral Tablet; Therapy: (Recorded:43Pys6594) to Recorded   6  One Daily Mens TABS; Therapy: (Recorded:85Aco2549) to Recorded   7  Pantoprazole Sodium 40 MG Oral Tablet Delayed Release; Therapy: (Recorded:00Cox5228) to Recorded   8   PredniSONE 2 5 MG Oral Tablet; take as directed; Therapy: 60TYU6232 to (Evaluate:09Yha0600)  Requested for: 55WOS2301; Last   Rx:20Nov2017 Ordered   9  PredniSONE 20 MG Oral Tablet; TAKE 1 TABLET DAILY; Therapy: (Carol Dowling) to Recorded   10  Vitamin B-12 1000 MCG Oral Tablet; Therapy: (Recorded:65Yck0949) to Recorded   11  Xarelto 20 MG Oral Tablet; 1 by mouth twice daily; Therapy: (Recorded:20Nov2017) to Recorded    Allergies    1   No Known Drug Allergies    Plan  Health Maintenance    · From  Pantoprazole Sodium 40 MG Oral Tablet Delayed Release  To  Pantoprazole Sodium 40 MG Oral Tablet Delayed Release TAKE 1 TABLET DAILY  Pulmonary embolism    · Xarelto 20 MG Oral Tablet; one tab po daily    Signatures   Electronically signed by : Janelle Hensley, ; Dec 18 2017  3:58PM EST                       (Author)

## 2018-01-24 ENCOUNTER — TELEPHONE (OUTPATIENT)
Dept: HEMATOLOGY ONCOLOGY | Facility: CLINIC | Age: 63
End: 2018-01-24

## 2018-01-24 ENCOUNTER — LAB (OUTPATIENT)
Dept: LAB | Facility: MEDICAL CENTER | Age: 63
End: 2018-01-24
Payer: COMMERCIAL

## 2018-01-24 VITALS
DIASTOLIC BLOOD PRESSURE: 76 MMHG | BODY MASS INDEX: 29.93 KG/M2 | WEIGHT: 197.5 LBS | RESPIRATION RATE: 16 BRPM | HEART RATE: 95 BPM | OXYGEN SATURATION: 99 % | SYSTOLIC BLOOD PRESSURE: 142 MMHG | TEMPERATURE: 97.9 F | HEIGHT: 68 IN

## 2018-01-24 DIAGNOSIS — D59.19 OTHER AUTOIMMUNE HEMOLYTIC ANEMIAS: ICD-10-CM

## 2018-01-24 LAB
ALBUMIN SERPL BCP-MCNC: 4.1 G/DL (ref 3.5–5)
ALP SERPL-CCNC: 70 U/L (ref 46–116)
ALT SERPL W P-5'-P-CCNC: 43 U/L (ref 12–78)
ANION GAP SERPL CALCULATED.3IONS-SCNC: 6 MMOL/L (ref 4–13)
AST SERPL W P-5'-P-CCNC: 26 U/L (ref 5–45)
BILIRUB SERPL-MCNC: 2.85 MG/DL (ref 0.2–1)
BUN SERPL-MCNC: 17 MG/DL (ref 5–25)
CALCIUM SERPL-MCNC: 9.2 MG/DL (ref 8.3–10.1)
CHLORIDE SERPL-SCNC: 107 MMOL/L (ref 100–108)
CO2 SERPL-SCNC: 30 MMOL/L (ref 21–32)
CREAT SERPL-MCNC: 0.98 MG/DL (ref 0.6–1.3)
ERYTHROCYTE [DISTWIDTH] IN BLOOD BY AUTOMATED COUNT: 19.4 % (ref 11.6–15.1)
GFR SERPL CREATININE-BSD FRML MDRD: 82 ML/MIN/1.73SQ M
GLUCOSE P FAST SERPL-MCNC: 83 MG/DL (ref 65–99)
HCT VFR BLD AUTO: 34.4 % (ref 36.5–49.3)
HGB BLD-MCNC: 11.6 G/DL (ref 12–17)
MCH RBC QN AUTO: 39.3 PG (ref 26.8–34.3)
MCHC RBC AUTO-ENTMCNC: 33.7 G/DL (ref 31.4–37.4)
MCV RBC AUTO: 117 FL (ref 82–98)
PLATELET # BLD AUTO: 586 THOUSANDS/UL (ref 149–390)
PMV BLD AUTO: 10 FL (ref 8.9–12.7)
POTASSIUM SERPL-SCNC: 3.4 MMOL/L (ref 3.5–5.3)
PROT SERPL-MCNC: 6.8 G/DL (ref 6.4–8.2)
RBC # BLD AUTO: 2.95 MILLION/UL (ref 3.88–5.62)
SODIUM SERPL-SCNC: 143 MMOL/L (ref 136–145)
WBC # BLD AUTO: 9.66 THOUSAND/UL (ref 4.31–10.16)

## 2018-01-24 PROCEDURE — 80053 COMPREHEN METABOLIC PANEL: CPT

## 2018-01-24 PROCEDURE — 85027 COMPLETE CBC AUTOMATED: CPT

## 2018-01-24 PROCEDURE — 36415 COLL VENOUS BLD VENIPUNCTURE: CPT

## 2018-01-24 NOTE — TELEPHONE ENCOUNTER
Reviewed with patient that hgb = 11 6  He will continue to hold Danazole and decrease prednisone to 10mg PO daily  Patient will have CBC re checked next week and call for results  He states he does feel very fatigued, lightheaded and sob with ambulation  This is how he usually feels when he needs a transfusion  His BP has improved 165/87    Pt will continue to monitor and call me with any additional questions or concerns

## 2018-01-29 DIAGNOSIS — I15.8 OTHER SECONDARY HYPERTENSION: Primary | ICD-10-CM

## 2018-01-29 RX ORDER — VALSARTAN 40 MG/1
40 TABLET ORAL DAILY
Qty: 30 TABLET | Refills: 3 | Status: SHIPPED | OUTPATIENT
Start: 2018-01-29 | End: 2018-01-30 | Stop reason: SDUPTHER

## 2018-01-30 DIAGNOSIS — I15.8 OTHER SECONDARY HYPERTENSION: ICD-10-CM

## 2018-01-30 RX ORDER — VALSARTAN 40 MG/1
40 TABLET ORAL DAILY
Qty: 30 TABLET | Refills: 0 | Status: SHIPPED | OUTPATIENT
Start: 2018-01-30 | End: 2018-02-14 | Stop reason: SDUPTHER

## 2018-01-31 ENCOUNTER — LAB (OUTPATIENT)
Dept: LAB | Facility: MEDICAL CENTER | Age: 63
End: 2018-01-31
Payer: COMMERCIAL

## 2018-01-31 ENCOUNTER — TELEPHONE (OUTPATIENT)
Dept: HEMATOLOGY ONCOLOGY | Facility: CLINIC | Age: 63
End: 2018-01-31

## 2018-01-31 DIAGNOSIS — D59.10 ANEMIA, AUTOIMMUNE HEMOLYTIC (HCC): ICD-10-CM

## 2018-01-31 LAB
ALBUMIN SERPL BCP-MCNC: 4.1 G/DL (ref 3.5–5)
ALP SERPL-CCNC: 74 U/L (ref 46–116)
ALT SERPL W P-5'-P-CCNC: 42 U/L (ref 12–78)
ANION GAP SERPL CALCULATED.3IONS-SCNC: 7 MMOL/L (ref 4–13)
AST SERPL W P-5'-P-CCNC: 28 U/L (ref 5–45)
BILIRUB SERPL-MCNC: 3.06 MG/DL (ref 0.2–1)
BUN SERPL-MCNC: 18 MG/DL (ref 5–25)
CALCIUM SERPL-MCNC: 8.7 MG/DL (ref 8.3–10.1)
CHLORIDE SERPL-SCNC: 107 MMOL/L (ref 100–108)
CO2 SERPL-SCNC: 29 MMOL/L (ref 21–32)
CREAT SERPL-MCNC: 1.08 MG/DL (ref 0.6–1.3)
ERYTHROCYTE [DISTWIDTH] IN BLOOD BY AUTOMATED COUNT: 17.9 % (ref 11.6–15.1)
GFR SERPL CREATININE-BSD FRML MDRD: 73 ML/MIN/1.73SQ M
GLUCOSE P FAST SERPL-MCNC: 107 MG/DL (ref 65–99)
HCT VFR BLD AUTO: 33.8 % (ref 36.5–49.3)
HGB BLD-MCNC: 11.2 G/DL (ref 12–17)
MCH RBC QN AUTO: 39 PG (ref 26.8–34.3)
MCHC RBC AUTO-ENTMCNC: 33.1 G/DL (ref 31.4–37.4)
MCV RBC AUTO: 118 FL (ref 82–98)
PLATELET # BLD AUTO: 527 THOUSANDS/UL (ref 149–390)
PMV BLD AUTO: 9.7 FL (ref 8.9–12.7)
POTASSIUM SERPL-SCNC: 3.6 MMOL/L (ref 3.5–5.3)
PROT SERPL-MCNC: 6.8 G/DL (ref 6.4–8.2)
RBC # BLD AUTO: 2.87 MILLION/UL (ref 3.88–5.62)
SODIUM SERPL-SCNC: 143 MMOL/L (ref 136–145)
WBC # BLD AUTO: 9.78 THOUSAND/UL (ref 4.31–10.16)

## 2018-01-31 PROCEDURE — 36415 COLL VENOUS BLD VENIPUNCTURE: CPT

## 2018-01-31 PROCEDURE — 85027 COMPLETE CBC AUTOMATED: CPT

## 2018-01-31 PROCEDURE — 80053 COMPREHEN METABOLIC PANEL: CPT

## 2018-01-31 NOTE — TELEPHONE ENCOUNTER
Caller was wanting results of his hemoglobin he had done today as well as further instructions on how to proceed with care   [phone]

## 2018-01-31 NOTE — TELEPHONE ENCOUNTER
Spoke with patient today - labs reviewed with Dr Mohit Fernández  He will decrease prednisone to 10mg PO every other day  Diovan was started today  He will re check CBC in one week and marsha me for results and instructions

## 2018-02-06 ENCOUNTER — APPOINTMENT (OUTPATIENT)
Dept: LAB | Facility: MEDICAL CENTER | Age: 63
End: 2018-02-06
Payer: COMMERCIAL

## 2018-02-06 ENCOUNTER — TELEPHONE (OUTPATIENT)
Dept: HEMATOLOGY ONCOLOGY | Facility: CLINIC | Age: 63
End: 2018-02-06

## 2018-02-06 DIAGNOSIS — D59.10 ANEMIA, AUTOIMMUNE HEMOLYTIC (HCC): ICD-10-CM

## 2018-02-06 LAB
ALBUMIN SERPL BCP-MCNC: 4 G/DL (ref 3.5–5)
ALP SERPL-CCNC: 78 U/L (ref 46–116)
ALT SERPL W P-5'-P-CCNC: 40 U/L (ref 12–78)
ANION GAP SERPL CALCULATED.3IONS-SCNC: 7 MMOL/L (ref 4–13)
AST SERPL W P-5'-P-CCNC: 33 U/L (ref 5–45)
BASOPHILS # BLD AUTO: 0.11 THOUSANDS/ΜL (ref 0–0.1)
BASOPHILS NFR BLD AUTO: 1 % (ref 0–1)
BILIRUB SERPL-MCNC: 3.53 MG/DL (ref 0.2–1)
BUN SERPL-MCNC: 18 MG/DL (ref 5–25)
CALCIUM SERPL-MCNC: 8.8 MG/DL (ref 8.3–10.1)
CHLORIDE SERPL-SCNC: 107 MMOL/L (ref 100–108)
CO2 SERPL-SCNC: 29 MMOL/L (ref 21–32)
CREAT SERPL-MCNC: 0.91 MG/DL (ref 0.6–1.3)
EOSINOPHIL # BLD AUTO: 0.6 THOUSAND/ΜL (ref 0–0.61)
EOSINOPHIL NFR BLD AUTO: 4 % (ref 0–6)
ERYTHROCYTE [DISTWIDTH] IN BLOOD BY AUTOMATED COUNT: 19.1 % (ref 11.6–15.1)
GFR SERPL CREATININE-BSD FRML MDRD: 89 ML/MIN/1.73SQ M
GLUCOSE P FAST SERPL-MCNC: 84 MG/DL (ref 65–99)
HCT VFR BLD AUTO: 28.9 % (ref 36.5–49.3)
HGB BLD-MCNC: 9.3 G/DL (ref 12–17)
LYMPHOCYTES # BLD AUTO: 6.78 THOUSANDS/ΜL (ref 0.6–4.47)
LYMPHOCYTES NFR BLD AUTO: 47 % (ref 14–44)
MCH RBC QN AUTO: 38.9 PG (ref 26.8–34.3)
MCHC RBC AUTO-ENTMCNC: 32.2 G/DL (ref 31.4–37.4)
MCV RBC AUTO: 121 FL (ref 82–98)
MONOCYTES # BLD AUTO: 1.6 THOUSAND/ΜL (ref 0.17–1.22)
MONOCYTES NFR BLD AUTO: 11 % (ref 4–12)
NEUTROPHILS # BLD AUTO: 5.36 THOUSANDS/ΜL (ref 1.85–7.62)
NEUTS SEG NFR BLD AUTO: 37 % (ref 43–75)
NRBC BLD AUTO-RTO: 3 /100 WBCS
PLATELET # BLD AUTO: 534 THOUSANDS/UL (ref 149–390)
PMV BLD AUTO: 9.6 FL (ref 8.9–12.7)
POTASSIUM SERPL-SCNC: 3.4 MMOL/L (ref 3.5–5.3)
PROT SERPL-MCNC: 6.6 G/DL (ref 6.4–8.2)
RBC # BLD AUTO: 2.39 MILLION/UL (ref 3.88–5.62)
SODIUM SERPL-SCNC: 143 MMOL/L (ref 136–145)
WBC # BLD AUTO: 14.52 THOUSAND/UL (ref 4.31–10.16)

## 2018-02-06 PROCEDURE — 80053 COMPREHEN METABOLIC PANEL: CPT

## 2018-02-06 PROCEDURE — 36415 COLL VENOUS BLD VENIPUNCTURE: CPT

## 2018-02-06 PROCEDURE — 85025 COMPLETE CBC W/AUTO DIFF WBC: CPT

## 2018-02-06 NOTE — TELEPHONE ENCOUNTER
Spoke with patient = hgb = 9 2   BP has been coming down 153/87 , 156/85  Per Dr Marci Newsome he is to continue on prednisone 10mg PO every other day  Increase Diovan and cyclosporin to 2 tablets PO daily  He will call me by Friday with an update on how he is feeling - transfusion will be ordered PRN    Patient verbalized understanding

## 2018-02-12 ENCOUNTER — TELEPHONE (OUTPATIENT)
Dept: HEMATOLOGY ONCOLOGY | Facility: CLINIC | Age: 63
End: 2018-02-12

## 2018-02-12 ENCOUNTER — APPOINTMENT (OUTPATIENT)
Dept: LAB | Facility: MEDICAL CENTER | Age: 63
End: 2018-02-12
Payer: COMMERCIAL

## 2018-02-12 DIAGNOSIS — D59.10 ANEMIA, AUTOIMMUNE HEMOLYTIC (HCC): ICD-10-CM

## 2018-02-12 LAB
ALBUMIN SERPL BCP-MCNC: 4.1 G/DL (ref 3.5–5)
ALP SERPL-CCNC: 76 U/L (ref 46–116)
ALT SERPL W P-5'-P-CCNC: 36 U/L (ref 12–78)
ANION GAP SERPL CALCULATED.3IONS-SCNC: 9 MMOL/L (ref 4–13)
ANISOCYTOSIS BLD QL SMEAR: PRESENT
AST SERPL W P-5'-P-CCNC: 30 U/L (ref 5–45)
BASOPHILS # BLD MANUAL: 0 THOUSAND/UL (ref 0–0.1)
BASOPHILS NFR MAR MANUAL: 0 % (ref 0–1)
BILIRUB SERPL-MCNC: 4.28 MG/DL (ref 0.2–1)
BUN SERPL-MCNC: 13 MG/DL (ref 5–25)
CALCIUM SERPL-MCNC: 8.7 MG/DL (ref 8.3–10.1)
CHLORIDE SERPL-SCNC: 108 MMOL/L (ref 100–108)
CO2 SERPL-SCNC: 28 MMOL/L (ref 21–32)
CREAT SERPL-MCNC: 0.96 MG/DL (ref 0.6–1.3)
EOSINOPHIL # BLD MANUAL: 0.69 THOUSAND/UL (ref 0–0.4)
EOSINOPHIL NFR BLD MANUAL: 6 % (ref 0–6)
ERYTHROCYTE [DISTWIDTH] IN BLOOD BY AUTOMATED COUNT: 21.7 % (ref 11.6–15.1)
GFR SERPL CREATININE-BSD FRML MDRD: 84 ML/MIN/1.73SQ M
GLUCOSE P FAST SERPL-MCNC: 85 MG/DL (ref 65–99)
HCT VFR BLD AUTO: 27.8 % (ref 36.5–49.3)
HGB BLD-MCNC: 9.3 G/DL (ref 12–17)
LYMPHOCYTES # BLD AUTO: 6.92 THOUSAND/UL (ref 0.6–4.47)
LYMPHOCYTES # BLD AUTO: 60 % (ref 14–44)
MACROCYTES BLD QL AUTO: PRESENT
MCH RBC QN AUTO: 41.5 PG (ref 26.8–34.3)
MCHC RBC AUTO-ENTMCNC: 33.5 G/DL (ref 31.4–37.4)
MCV RBC AUTO: 124 FL (ref 82–98)
MONOCYTES # BLD AUTO: 0.69 THOUSAND/UL (ref 0–1.22)
MONOCYTES NFR BLD: 6 % (ref 4–12)
NEUTROPHILS # BLD MANUAL: 3.23 THOUSAND/UL (ref 1.85–7.62)
NEUTS SEG NFR BLD AUTO: 28 % (ref 43–75)
NRBC BLD AUTO-RTO: 9 /100 WBCS
PLATELET # BLD AUTO: 517 THOUSANDS/UL (ref 149–390)
PLATELET BLD QL SMEAR: ABNORMAL
PMV BLD AUTO: 9.8 FL (ref 8.9–12.7)
POIKILOCYTOSIS BLD QL SMEAR: PRESENT
POLYCHROMASIA BLD QL SMEAR: PRESENT
POTASSIUM SERPL-SCNC: 3.5 MMOL/L (ref 3.5–5.3)
PROT SERPL-MCNC: 6.5 G/DL (ref 6.4–8.2)
RBC # BLD AUTO: 2.24 MILLION/UL (ref 3.88–5.62)
RBC MORPH BLD: PRESENT
SODIUM SERPL-SCNC: 145 MMOL/L (ref 136–145)
WBC # BLD AUTO: 11.54 THOUSAND/UL (ref 4.31–10.16)

## 2018-02-12 PROCEDURE — 85007 BL SMEAR W/DIFF WBC COUNT: CPT

## 2018-02-12 PROCEDURE — 36415 COLL VENOUS BLD VENIPUNCTURE: CPT

## 2018-02-12 PROCEDURE — 85027 COMPLETE CBC AUTOMATED: CPT

## 2018-02-12 PROCEDURE — 80053 COMPREHEN METABOLIC PANEL: CPT

## 2018-02-12 RX ORDER — SODIUM CHLORIDE 9 MG/ML
20 INJECTION, SOLUTION INTRAVENOUS CONTINUOUS
Status: DISCONTINUED | OUTPATIENT
Start: 2018-02-13 | End: 2018-02-16 | Stop reason: HOSPADM

## 2018-02-12 NOTE — TELEPHONE ENCOUNTER
Patient reports SOB, fatigue, headache and swelling in lower legs  His BP has gotten better since increasing Diovan  Blood transfusion arranged for tomorrow  Follow up with Dr Sagar Tran scheduled in one week  Patient will go to the ER if symptoms do not improve after transfusion    He will call with any additional questions or concerns

## 2018-02-12 NOTE — TELEPHONE ENCOUNTER
PT HAVING BAD SYMPTOMS - POSSIBLE TRANSFUSION  HAD BLOOD DRAWN THIS MORNING AT  LABS    PLEASE CALL, TXS

## 2018-02-13 ENCOUNTER — HOSPITAL ENCOUNTER (OUTPATIENT)
Dept: INFUSION CENTER | Facility: CLINIC | Age: 63
Discharge: HOME | End: 2018-02-13
Payer: COMMERCIAL

## 2018-02-13 VITALS
HEART RATE: 72 BPM | SYSTOLIC BLOOD PRESSURE: 160 MMHG | TEMPERATURE: 97.4 F | DIASTOLIC BLOOD PRESSURE: 99 MMHG | RESPIRATION RATE: 16 BRPM

## 2018-02-13 PROCEDURE — 86922 COMPATIBILITY TEST ANTIGLOB: CPT

## 2018-02-13 PROCEDURE — 86870 RBC ANTIBODY IDENTIFICATION: CPT | Performed by: INTERNAL MEDICINE

## 2018-02-13 PROCEDURE — 86900 BLOOD TYPING SEROLOGIC ABO: CPT | Performed by: INTERNAL MEDICINE

## 2018-02-13 PROCEDURE — 86901 BLOOD TYPING SEROLOGIC RH(D): CPT | Performed by: INTERNAL MEDICINE

## 2018-02-13 PROCEDURE — 86921 COMPATIBILITY TEST INCUBATE: CPT

## 2018-02-13 PROCEDURE — 86850 RBC ANTIBODY SCREEN: CPT | Performed by: INTERNAL MEDICINE

## 2018-02-13 RX ADMIN — SODIUM CHLORIDE 20 ML/HR: 0.9 INJECTION, SOLUTION INTRAVENOUS at 08:55

## 2018-02-13 NOTE — PROGRESS NOTES
Pt  Denies new symptoms at this time  Pt  Denies SOB, but verbalizes mild LOWE  Pt  States he is feeling better today than yesterday  Pt  Denies chest pain at this time  Type and Screen obtained as ordered  Per Blood bank, Pt  Has antibodies and blood will not be received today for transfusion  RN spoke with Rolanda Farrell RN  OK to transfuse when blood received  Instructions received to send Pt  To ER if he is not feeling well today  Pt  Stated again he is feeling better than yesterday and will not go to ER at this time  Pt  Understands to go to ER with continued or new symptoms  Pt  Will Call office of Dr Ned Lynn with any questions or concerns  Pt  Understands he will receive a call from hospitals Infusion if Blood is not received morning of 2/14/18  Pt  Verbalizes elevated BP ongoing  Diovan increased to 40mg BID  Pt  Will monitor and discuss with Physician

## 2018-02-14 ENCOUNTER — HOSPITAL ENCOUNTER (OUTPATIENT)
Dept: INFUSION CENTER | Facility: CLINIC | Age: 63
Discharge: HOME | End: 2018-02-14
Payer: COMMERCIAL

## 2018-02-14 DIAGNOSIS — I15.8 OTHER SECONDARY HYPERTENSION: ICD-10-CM

## 2018-02-14 DIAGNOSIS — D59.10 AUTOIMMUNE HEMOLYTIC ANEMIA (HCC): Primary | ICD-10-CM

## 2018-02-14 PROCEDURE — 86902 BLOOD TYPE ANTIGEN DONOR EA: CPT

## 2018-02-14 PROCEDURE — 86870 RBC ANTIBODY IDENTIFICATION: CPT

## 2018-02-14 PROCEDURE — 86906 BLD TYPING SEROLOGIC RH PHNT: CPT

## 2018-02-14 PROCEDURE — 86905 BLOOD TYPING RBC ANTIGENS: CPT

## 2018-02-14 PROCEDURE — 86901 BLOOD TYPING SEROLOGIC RH(D): CPT

## 2018-02-14 PROCEDURE — 86860 RBC ANTIBODY ELUTION: CPT

## 2018-02-14 PROCEDURE — 86880 COOMBS TEST DIRECT: CPT

## 2018-02-14 PROCEDURE — 86945 BLOOD PRODUCT/IRRADIATION: CPT

## 2018-02-14 PROCEDURE — 86900 BLOOD TYPING SEROLOGIC ABO: CPT

## 2018-02-14 RX ORDER — CYCLOSPORINE 100 MG/1
CAPSULE, GELATIN COATED ORAL
Qty: 60 CAPSULE | Refills: 3 | Status: SHIPPED | OUTPATIENT
Start: 2018-02-14 | End: 2018-03-29

## 2018-02-14 RX ORDER — VALSARTAN 40 MG/1
TABLET ORAL
Qty: 90 TABLET | Refills: 3 | Status: SHIPPED | OUTPATIENT
Start: 2018-02-14 | End: 2018-03-20

## 2018-02-14 NOTE — TELEPHONE ENCOUNTER
Spoke with patient today - BP still elevated at 165/90  Headache is severe at times  Blood transfusion postponed to 2/16/18 due to antibodies  Diovan increased to 3 tabs daily    Cyclosporin and diovan renewed with pharmacy

## 2018-02-15 LAB
BLOOD GROUP ANTIBODIES SERPL: NORMAL
BLOOD GROUP ANTIBODIES SERPL: NORMAL

## 2018-02-16 ENCOUNTER — HOSPITAL ENCOUNTER (OUTPATIENT)
Dept: INFUSION CENTER | Facility: CLINIC | Age: 63
Discharge: HOME | End: 2018-02-16
Payer: COMMERCIAL

## 2018-02-16 VITALS
SYSTOLIC BLOOD PRESSURE: 149 MMHG | TEMPERATURE: 98.5 F | RESPIRATION RATE: 18 BRPM | HEART RATE: 72 BPM | DIASTOLIC BLOOD PRESSURE: 78 MMHG

## 2018-02-16 PROCEDURE — 36430 TRANSFUSION BLD/BLD COMPNT: CPT

## 2018-02-16 PROCEDURE — P9040 RBC LEUKOREDUCED IRRADIATED: HCPCS

## 2018-02-16 RX ORDER — CYCLOSPORINE 25 MG/1
100 CAPSULE, LIQUID FILLED ORAL 3 TIMES DAILY
COMMUNITY
End: 2018-02-19 | Stop reason: SDUPTHER

## 2018-02-16 NOTE — PROGRESS NOTES
Patient tolerated his infusion of 2 units of PRBC well without adverse reactions  He declined his AVS but is aware to go to the ER with SOB

## 2018-02-16 NOTE — PLAN OF CARE

## 2018-02-19 ENCOUNTER — OFFICE VISIT (OUTPATIENT)
Dept: HEMATOLOGY ONCOLOGY | Facility: CLINIC | Age: 63
End: 2018-02-19
Payer: COMMERCIAL

## 2018-02-19 VITALS
BODY MASS INDEX: 31.77 KG/M2 | TEMPERATURE: 98.2 F | DIASTOLIC BLOOD PRESSURE: 84 MMHG | SYSTOLIC BLOOD PRESSURE: 152 MMHG | HEIGHT: 67 IN | HEART RATE: 82 BPM | WEIGHT: 202.4 LBS | RESPIRATION RATE: 16 BRPM

## 2018-02-19 DIAGNOSIS — D59.10 AUTOIMMUNE HEMOLYTIC ANEMIA (HCC): Primary | ICD-10-CM

## 2018-02-19 PROCEDURE — 99215 OFFICE O/P EST HI 40 MIN: CPT | Performed by: INTERNAL MEDICINE

## 2018-02-19 NOTE — PROGRESS NOTES
Maximilianorito Smith  1955  [address] [facility] [address] [address] [phone]    Chief Complaint   Patient presents with    Follow-up            No history exists  History of Present Illness:  -Chilango Hoang, DO:  -Previous Therapy (if not listed in Oncology History):  -Current Therapy (if not listed in Oncology History):  -Disease Status:  -Interval History:  -Tumor Markers:    Review of Systems:  Review of Systems   Constitutional: Negative for chills and fever  HENT: Negative for nosebleeds  Eyes: Negative for discharge  Respiratory: Negative for cough and shortness of breath  Cardiovascular: Negative for chest pain  Gastrointestinal: Negative for abdominal pain, constipation and diarrhea  Endocrine: Negative for polydipsia  Genitourinary: Negative for hematuria  Musculoskeletal: Negative for arthralgias  Skin: Negative for color change  Allergic/Immunologic: Negative for immunocompromised state  Neurological: Negative for dizziness and headaches  Hematological: Negative for adenopathy  Psychiatric/Behavioral: Negative for agitation         Patient Active Problem List   Diagnosis    Tobacco abuse    Palpitation    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)    Jaundice    Hyperbilirubinemia    Hemolytic anemia (HCC)    Symptomatic anemia    Acute pulmonary embolism (HCC)    Portal vein thrombosis    Elevated blood pressure reading without diagnosis of hypertension    GERD (gastroesophageal reflux disease)    Autoimmune hemolytic anemia (HCC)    Biliary colic    Obstipation    Calculus of gallbladder with acute on chronic cholecystitis without obstruction     Past Medical History:   Diagnosis Date    Autoimmune hemolytic anemia (HCC)     Hemolytic anemia (HCC)     Palpitation     Tobacco abuse      Past Surgical History:   Procedure Laterality Date    KNEE SURGERY Right  AL LAP,CHOLECYSTECTOMY/GRAPH N/A 12/23/2017    Procedure: CHOLECYSTECTOMY LAPAROSCOPIC with cholangiogram;  Surgeon: Mya Gaytan MD;  Location: [facility];  Service: General    AL REMOVAL SPLEEN, TOTAL N/A 5/18/2017    Procedure: LAPAROSCOPIC HAND ASSIST SPLENECTOMY;  Surgeon: Thais Gutierrez MD;  Location: [facility];  Service: Surgical Oncology    SHOULDER SURGERY Left      No family history on file  Social History     Social History    Marital status: /Civil Union     Spouse name: N/A    Number of children: N/A    Years of education: N/A     Occupational History    Not on file       Social History Main Topics    Smoking status: Light Tobacco Smoker     Types: Cigars    Smokeless tobacco: Current User      Comment: socially    Alcohol use 3 6 oz/week     6 Cans of beer per week      Comment: social    Drug use: No    Sexual activity: Not on file     Other Topics Concern    Not on file     Social History Narrative    No narrative on file       Current Outpatient Prescriptions:     aspirin 81 MG tablet, Take 81 mg by mouth daily, Disp: , Rfl:     cyanocobalamin (VITAMIN B-12) 1,000 mcg tablet, Take 1,000 mcg by mouth daily, Disp: , Rfl:     cycloSPORINE non-modified (SandIMMUNE) 100 mg capsule, Take 2 caps by mouth daily, Disp: 60 capsule, Rfl: 3    ergocalciferol (VITAMIN D2) 50,000 units, Take 50,000 Units by mouth once a week  , Disp: , Rfl:     Ferrous Sulfate (IRON) 325 (65 FE) MG TABS, Take 325 mg by mouth daily  , Disp: , Rfl:     Multiple Vitamins-Minerals (ONE DAILY MENS) TABS, Take by mouth, Disp: , Rfl:     pantoprazole (PROTONIX) 40 mg tablet, Take 1 tablet by mouth daily in the early morning, Disp: 30 tablet, Rfl: 0    predniSONE 20 mg tablet, Take 1 tablet by mouth daily (Patient taking differently: Take 10 mg by mouth every other day  ), Disp: 60 tablet, Rfl: 0    rivaroxaban (XARELTO) 20 mg tablet, Take 1 tablet by mouth daily with breakfast, Disp: 30 tablet, Rfl: 0    valsartan (DIOVAN) 40 mg tablet, 3 tablets by mouth daily (Patient taking differently: (120 mg A day) 3 tablets by mouth daily ), Disp: 90 tablet, Rfl: 3    influenza inactivated quadrivalent vaccine (FLULAVAL) 0 5 ML ANALILIA, Inject 0 5 mL into the shoulder, thigh, or buttocks prior to discharge (preventative) for up to 1 dose, Disp: 1 Syringe, Rfl: 0  Allergies   Allergen Reactions    Iodinated Diagnostic Agents Hives     Vitals:    02/19/18 0935   BP: 152/84   Pulse: 82   Resp: 16   Temp: 98 2 °F (36 8 °C)         Physical Exam   Constitutional: He is oriented to person, place, and time  He appears well-developed  HENT:   Head: Normocephalic  Eyes: Pupils are equal, round, and reactive to light  Neck: Neck supple  Cardiovascular: Normal rate and regular rhythm  No murmur heard  Pulmonary/Chest: Breath sounds normal  He has no wheezes  He has no rales  Abdominal: Soft  There is no tenderness  Musculoskeletal: Normal range of motion  He exhibits no edema or tenderness  Lymphadenopathy:     He has no cervical adenopathy  Neurological: He is alert and oriented to person, place, and time  He has normal reflexes  No cranial nerve deficit  Skin: No rash noted  No erythema  Psychiatric: He has a normal mood and affect  His behavior is normal            Performance Status: ECOG/Zubrod/WHO: 0 - Asymptomatic    Labs:  CBC, Coags, BMP, Mg, Phos     Imaging  No results found  I reviewed the above laboratory and imaging data  Discussion/Summary:  In summary, this is a 44-year-old male history of autoimmune hemolytic anemia as outlined  He is currently on cyclosporin 200 mg p o  daily, prednisone 10 mg p  o  daily  He had been on danazol but this was discontinued due to potential effect on blood pressure as well as psychological side effects, vivid dreams, agitation, etc   The symptoms resolved with danazol discontinuation  He initially had excellent response    1 wonders if there was some potential synergy between danazol and cyclosporin  For the moment, my inclination is to increase Diovan current 120 mg p o  daily, plan 160 mg p  o  daily  The patient states that this is fairly costly for him even with insurance coverage  We will investigate other potential options for better blood pressure control  Once his blood pressures more adequately controlled increase in cyclosporin to 300 mg is planned  I reviewed the above with the patient  He voiced understanding and agreement

## 2018-02-23 ENCOUNTER — APPOINTMENT (OUTPATIENT)
Dept: LAB | Facility: MEDICAL CENTER | Age: 63
End: 2018-02-23
Payer: COMMERCIAL

## 2018-02-23 ENCOUNTER — TRANSCRIBE ORDERS (OUTPATIENT)
Dept: ADMINISTRATIVE | Facility: HOSPITAL | Age: 63
End: 2018-02-23

## 2018-02-23 ENCOUNTER — TELEPHONE (OUTPATIENT)
Dept: HEMATOLOGY ONCOLOGY | Facility: CLINIC | Age: 63
End: 2018-02-23

## 2018-02-23 DIAGNOSIS — D59.10 ANEMIA, AUTOIMMUNE HEMOLYTIC (HCC): ICD-10-CM

## 2018-02-23 DIAGNOSIS — J01.10 ACUTE NON-RECURRENT FRONTAL SINUSITIS: Primary | ICD-10-CM

## 2018-02-23 LAB
ALBUMIN SERPL BCP-MCNC: 4.1 G/DL (ref 3.5–5)
ALP SERPL-CCNC: 77 U/L (ref 46–116)
ALT SERPL W P-5'-P-CCNC: 32 U/L (ref 12–78)
ANION GAP SERPL CALCULATED.3IONS-SCNC: 9 MMOL/L (ref 4–13)
AST SERPL W P-5'-P-CCNC: 35 U/L (ref 5–45)
BASOPHILS # BLD AUTO: 0.13 THOUSANDS/ΜL (ref 0–0.1)
BASOPHILS NFR BLD AUTO: 1 % (ref 0–1)
BILIRUB SERPL-MCNC: 5.43 MG/DL (ref 0.2–1)
BUN SERPL-MCNC: 22 MG/DL (ref 5–25)
CALCIUM SERPL-MCNC: 8.7 MG/DL (ref 8.3–10.1)
CHLORIDE SERPL-SCNC: 107 MMOL/L (ref 100–108)
CO2 SERPL-SCNC: 25 MMOL/L (ref 21–32)
CREAT SERPL-MCNC: 1.02 MG/DL (ref 0.6–1.3)
EOSINOPHIL # BLD AUTO: 0.61 THOUSAND/ΜL (ref 0–0.61)
EOSINOPHIL NFR BLD AUTO: 6 % (ref 0–6)
ERYTHROCYTE [DISTWIDTH] IN BLOOD BY AUTOMATED COUNT: 20.9 % (ref 11.6–15.1)
GFR SERPL CREATININE-BSD FRML MDRD: 78 ML/MIN/1.73SQ M
GLUCOSE P FAST SERPL-MCNC: 129 MG/DL (ref 65–99)
HCT VFR BLD AUTO: 26.9 % (ref 36.5–49.3)
HGB BLD-MCNC: 9.5 G/DL (ref 12–17)
LYMPHOCYTES # BLD AUTO: 3.17 THOUSANDS/ΜL (ref 0.6–4.47)
LYMPHOCYTES NFR BLD AUTO: 29 % (ref 14–44)
MCH RBC QN AUTO: 40.1 PG (ref 26.8–34.3)
MCHC RBC AUTO-ENTMCNC: 35.3 G/DL (ref 31.4–37.4)
MCV RBC AUTO: 114 FL (ref 82–98)
MONOCYTES # BLD AUTO: 1.33 THOUSAND/ΜL (ref 0.17–1.22)
MONOCYTES NFR BLD AUTO: 12 % (ref 4–12)
NEUTROPHILS # BLD AUTO: 5.83 THOUSANDS/ΜL (ref 1.85–7.62)
NEUTS SEG NFR BLD AUTO: 52 % (ref 43–75)
NRBC BLD AUTO-RTO: 1 /100 WBCS
PLATELET # BLD AUTO: 591 THOUSANDS/UL (ref 149–390)
PMV BLD AUTO: 9.9 FL (ref 8.9–12.7)
POTASSIUM SERPL-SCNC: 3.7 MMOL/L (ref 3.5–5.3)
PROT SERPL-MCNC: 6.6 G/DL (ref 6.4–8.2)
RBC # BLD AUTO: 2.37 MILLION/UL (ref 3.88–5.62)
SODIUM SERPL-SCNC: 141 MMOL/L (ref 136–145)
WBC # BLD AUTO: 11.11 THOUSAND/UL (ref 4.31–10.16)

## 2018-02-23 PROCEDURE — 80053 COMPREHEN METABOLIC PANEL: CPT

## 2018-02-23 PROCEDURE — 36415 COLL VENOUS BLD VENIPUNCTURE: CPT

## 2018-02-23 PROCEDURE — 85025 COMPLETE CBC W/AUTO DIFF WBC: CPT

## 2018-02-23 RX ORDER — AMOXICILLIN AND CLAVULANATE POTASSIUM 875; 125 MG/1; MG/1
1 TABLET, FILM COATED ORAL EVERY 12 HOURS SCHEDULED
Qty: 20 TABLET | Refills: 0 | Status: SHIPPED | OUTPATIENT
Start: 2018-02-23 | End: 2018-03-05

## 2018-02-23 NOTE — TELEPHONE ENCOUNTER
Patient is getting new BP cuff and stethoscope tomorrow  He will check BP over the weekend and call me Monday with numbers  If BP acceptable Cyclosporin will be increased  Headaches have improved  Augmentin will be sent to pharmacy for chest cold    Patient verbalized understadning

## 2018-03-02 ENCOUNTER — APPOINTMENT (OUTPATIENT)
Dept: LAB | Facility: MEDICAL CENTER | Age: 63
End: 2018-03-02
Payer: COMMERCIAL

## 2018-03-02 DIAGNOSIS — D59.10 ANEMIA, AUTOIMMUNE HEMOLYTIC (HCC): ICD-10-CM

## 2018-03-02 LAB
ALBUMIN SERPL BCP-MCNC: 3.9 G/DL (ref 3.5–5)
ALP SERPL-CCNC: 85 U/L (ref 46–116)
ALT SERPL W P-5'-P-CCNC: 31 U/L (ref 12–78)
ANION GAP SERPL CALCULATED.3IONS-SCNC: 8 MMOL/L (ref 4–13)
ANISOCYTOSIS BLD QL SMEAR: PRESENT
AST SERPL W P-5'-P-CCNC: 34 U/L (ref 5–45)
BASOPHILS # BLD MANUAL: 0.13 THOUSAND/UL (ref 0–0.1)
BASOPHILS NFR MAR MANUAL: 1 % (ref 0–1)
BILIRUB SERPL-MCNC: 5.18 MG/DL (ref 0.2–1)
BUN SERPL-MCNC: 21 MG/DL (ref 5–25)
CALCIUM SERPL-MCNC: 8.8 MG/DL (ref 8.3–10.1)
CHLORIDE SERPL-SCNC: 106 MMOL/L (ref 100–108)
CO2 SERPL-SCNC: 28 MMOL/L (ref 21–32)
CREAT SERPL-MCNC: 1.15 MG/DL (ref 0.6–1.3)
EOSINOPHIL # BLD MANUAL: 0.53 THOUSAND/UL (ref 0–0.4)
EOSINOPHIL NFR BLD MANUAL: 4 % (ref 0–6)
ERYTHROCYTE [DISTWIDTH] IN BLOOD BY AUTOMATED COUNT: 27.2 % (ref 11.6–15.1)
GFR SERPL CREATININE-BSD FRML MDRD: 67 ML/MIN/1.73SQ M
GIANT PLATELETS BLD QL SMEAR: PRESENT
GLUCOSE P FAST SERPL-MCNC: 92 MG/DL (ref 65–99)
HCT VFR BLD AUTO: 24.3 % (ref 36.5–49.3)
HGB BLD-MCNC: 8.2 G/DL (ref 12–17)
LYMPHOCYTES # BLD AUTO: 25 % (ref 14–44)
LYMPHOCYTES # BLD AUTO: 3.31 THOUSAND/UL (ref 0.6–4.47)
MACROCYTES BLD QL AUTO: PRESENT
MCH RBC QN AUTO: 43.2 PG (ref 26.8–34.3)
MCHC RBC AUTO-ENTMCNC: 33.7 G/DL (ref 31.4–37.4)
MCV RBC AUTO: 128 FL (ref 82–98)
METAMYELOCYTES NFR BLD MANUAL: 1 % (ref 0–1)
MONOCYTES # BLD AUTO: 1.19 THOUSAND/UL (ref 0–1.22)
MONOCYTES NFR BLD: 9 % (ref 4–12)
NEUTROPHILS # BLD MANUAL: 7.94 THOUSAND/UL (ref 1.85–7.62)
NEUTS BAND NFR BLD MANUAL: 2 % (ref 0–8)
NEUTS SEG NFR BLD AUTO: 58 % (ref 43–75)
NRBC BLD AUTO-RTO: 12 /100 WBCS
NRBC BLD AUTO-RTO: 9 /100 WBC (ref 0–2)
PLATELET # BLD AUTO: 618 THOUSANDS/UL (ref 149–390)
PLATELET BLD QL SMEAR: ABNORMAL
PLATELET CLUMP BLD QL SMEAR: PRESENT
PMV BLD AUTO: 9.7 FL (ref 8.9–12.7)
POIKILOCYTOSIS BLD QL SMEAR: PRESENT
POLYCHROMASIA BLD QL SMEAR: PRESENT
POTASSIUM SERPL-SCNC: 3.8 MMOL/L (ref 3.5–5.3)
PROT SERPL-MCNC: 6.6 G/DL (ref 6.4–8.2)
RBC # BLD AUTO: 1.9 MILLION/UL (ref 3.88–5.62)
RBC MORPH BLD: PRESENT
SODIUM SERPL-SCNC: 142 MMOL/L (ref 136–145)
WBC # BLD AUTO: 13.23 THOUSAND/UL (ref 4.31–10.16)

## 2018-03-02 PROCEDURE — 80053 COMPREHEN METABOLIC PANEL: CPT

## 2018-03-02 PROCEDURE — 36415 COLL VENOUS BLD VENIPUNCTURE: CPT

## 2018-03-02 PROCEDURE — 85007 BL SMEAR W/DIFF WBC COUNT: CPT

## 2018-03-02 PROCEDURE — 85027 COMPLETE CBC AUTOMATED: CPT

## 2018-03-05 ENCOUNTER — TELEPHONE (OUTPATIENT)
Dept: HEMATOLOGY ONCOLOGY | Facility: CLINIC | Age: 63
End: 2018-03-05

## 2018-03-05 ENCOUNTER — APPOINTMENT (OUTPATIENT)
Dept: LAB | Facility: CLINIC | Age: 63
End: 2018-03-05
Payer: COMMERCIAL

## 2018-03-05 DIAGNOSIS — D59.10 AUTOIMMUNE HEMOLYTIC ANEMIA (HCC): Primary | ICD-10-CM

## 2018-03-05 PROCEDURE — 86922 COMPATIBILITY TEST ANTIGLOB: CPT

## 2018-03-05 PROCEDURE — 86870 RBC ANTIBODY IDENTIFICATION: CPT

## 2018-03-05 PROCEDURE — 86900 BLOOD TYPING SEROLOGIC ABO: CPT

## 2018-03-05 PROCEDURE — 86921 COMPATIBILITY TEST INCUBATE: CPT

## 2018-03-05 PROCEDURE — 36415 COLL VENOUS BLD VENIPUNCTURE: CPT

## 2018-03-05 PROCEDURE — 86901 BLOOD TYPING SEROLOGIC RH(D): CPT

## 2018-03-05 PROCEDURE — 86850 RBC ANTIBODY SCREEN: CPT

## 2018-03-05 NOTE — TELEPHONE ENCOUNTER
req that we call him back with the results of the bloodwork that were taken last week     Results are in [employer]

## 2018-03-05 NOTE — TELEPHONE ENCOUNTER
Patient arranged to get 2 units PRBC - type and screen to be drawn today  Patient is aware that blood may not be available for a few days due to antibodies  If he becomes too symptomatic he will go to the Emergency Room  Follow up appointment scheduled with Dr Stevo Mahmood in one week  Patient increased prednisone to 20mg PO daily

## 2018-03-05 NOTE — TELEPHONE ENCOUNTER
Pt going to get packed red blood and the order was dated incorrectly so they need a new order to reflect tomorrow dates

## 2018-03-06 ENCOUNTER — TELEPHONE (OUTPATIENT)
Dept: HEMATOLOGY ONCOLOGY | Facility: CLINIC | Age: 63
End: 2018-03-06

## 2018-03-06 ENCOUNTER — HOSPITAL ENCOUNTER (OUTPATIENT)
Dept: INFUSION CENTER | Facility: CLINIC | Age: 63
Discharge: HOME | End: 2018-03-06
Payer: COMMERCIAL

## 2018-03-06 PROCEDURE — 86945 BLOOD PRODUCT/IRRADIATION: CPT

## 2018-03-06 PROCEDURE — 86870 RBC ANTIBODY IDENTIFICATION: CPT

## 2018-03-06 PROCEDURE — 86905 BLOOD TYPING RBC ANTIGENS: CPT

## 2018-03-06 PROCEDURE — 86902 BLOOD TYPE ANTIGEN DONOR EA: CPT

## 2018-03-06 PROCEDURE — 86906 BLD TYPING SEROLOGIC RH PHNT: CPT

## 2018-03-06 PROCEDURE — 86901 BLOOD TYPING SEROLOGIC RH(D): CPT

## 2018-03-06 PROCEDURE — 86860 RBC ANTIBODY ELUTION: CPT

## 2018-03-06 PROCEDURE — 86900 BLOOD TYPING SEROLOGIC ABO: CPT

## 2018-03-06 PROCEDURE — 86880 COOMBS TEST DIRECT: CPT

## 2018-03-06 NOTE — TELEPHONE ENCOUNTER
PT WAS TO GET BLOOD TODAY BUT IT WAS NOT DRAWN AS HE HAD MULTIPLE ANTI-BODIES  HAD TO MOVE IT TO THURS AT 8:30    ANY QUESTIONS, PLEASE CALL, TXS

## 2018-03-07 LAB
BLOOD GROUP ANTIBODIES SERPL: NORMAL
BLOOD GROUP ANTIBODIES SERPL: NORMAL

## 2018-03-08 ENCOUNTER — HOSPITAL ENCOUNTER (OUTPATIENT)
Dept: INFUSION CENTER | Facility: CLINIC | Age: 63
Discharge: HOME | End: 2018-03-08
Payer: COMMERCIAL

## 2018-03-08 VITALS
HEART RATE: 70 BPM | DIASTOLIC BLOOD PRESSURE: 87 MMHG | RESPIRATION RATE: 18 BRPM | SYSTOLIC BLOOD PRESSURE: 167 MMHG | TEMPERATURE: 98.1 F

## 2018-03-08 PROCEDURE — 36430 TRANSFUSION BLD/BLD COMPNT: CPT

## 2018-03-08 PROCEDURE — P9040 RBC LEUKOREDUCED IRRADIATED: HCPCS

## 2018-03-08 NOTE — PLAN OF CARE
Problem: Potential for Falls  Goal: Patient will remain free of falls  INTERVENTIONS:  - Assess patient frequently for physical needs  -  Identify cognitive and physical deficits and behaviors that affect risk of falls    -  [place] fall precautions as indicated by assessment   - Educate patient/family on patient safety including physical limitations  - Instruct patient to call for assistance with activity based on assessment  - Modify environment to reduce risk of injury  - Consider OT/PT consult to assist with strengthening/mobility   Outcome: Progressing

## 2018-03-12 ENCOUNTER — OFFICE VISIT (OUTPATIENT)
Dept: HEMATOLOGY ONCOLOGY | Facility: CLINIC | Age: 63
End: 2018-03-12
Payer: COMMERCIAL

## 2018-03-12 VITALS
BODY MASS INDEX: 32.02 KG/M2 | WEIGHT: 204 LBS | SYSTOLIC BLOOD PRESSURE: 160 MMHG | RESPIRATION RATE: 16 BRPM | HEIGHT: 67 IN | HEART RATE: 77 BPM | OXYGEN SATURATION: 98 % | TEMPERATURE: 97.9 F | DIASTOLIC BLOOD PRESSURE: 90 MMHG

## 2018-03-12 DIAGNOSIS — D59.11 HEMOLYTIC ANEMIA DUE TO WARM ANTIBODY (HCC): Primary | ICD-10-CM

## 2018-03-12 PROCEDURE — 99215 OFFICE O/P EST HI 40 MIN: CPT | Performed by: INTERNAL MEDICINE

## 2018-03-12 NOTE — PROGRESS NOTES
Franny Murillo  1955  [address] [facility]  [address] [address] [phone]    Chief Complaint   Patient presents with    Follow-up          No history exists  History of Present Illness:  -No ref  provider found:  -Previous Therapy (if not listed in Oncology History):  -Current Therapy (if not listed in Oncology History):  -Disease Status:  -Interval History:  -Tumor Markers:    Review of Systems:  Review of Systems   Constitutional: Negative for chills and fever  HENT: Negative for nosebleeds  Eyes: Negative for discharge  Respiratory: Negative for cough and shortness of breath  Cardiovascular: Negative for chest pain  Gastrointestinal: Negative for abdominal pain, constipation and diarrhea  Endocrine: Negative for polydipsia  Genitourinary: Negative for hematuria  Musculoskeletal: Negative for arthralgias  Skin: Negative for color change  Allergic/Immunologic: Negative for immunocompromised state  Neurological: Negative for dizziness and headaches  Hematological: Negative for adenopathy  Psychiatric/Behavioral: Negative for agitation         Patient Active Problem List   Diagnosis    Tobacco abuse    Palpitation    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)    Jaundice    Hyperbilirubinemia    Hemolytic anemia (HCC)    Symptomatic anemia    Acute pulmonary embolism (HCC)    Portal vein thrombosis    Elevated blood pressure reading without diagnosis of hypertension    GERD (gastroesophageal reflux disease)    Autoimmune hemolytic anemia (HCC)    Biliary colic    Obstipation    Calculus of gallbladder with acute on chronic cholecystitis without obstruction     Past Medical History:   Diagnosis Date    Autoimmune hemolytic anemia (HCC)     Hemolytic anemia (HCC)     Palpitation     Tobacco abuse      Past Surgical History:   Procedure Laterality Date    KNEE SURGERY Right  NE LAP,CHOLECYSTECTOMY/GRAPH N/A 12/23/2017    Procedure: CHOLECYSTECTOMY LAPAROSCOPIC with cholangiogram;  Surgeon: Juju Laurent MD;  Location: [facility];  Service: General    NE REMOVAL SPLEEN, TOTAL N/A 5/18/2017    Procedure: LAPAROSCOPIC HAND ASSIST SPLENECTOMY;  Surgeon: Cindy Yuen MD;  Location: [facility];  Service: Surgical Oncology    SHOULDER SURGERY Left      No family history on file  Social History     Social History    Marital status: /Civil Union     Spouse name: N/A    Number of children: N/A    Years of education: N/A     Occupational History    Not on file       Social History Main Topics    Smoking status: Light Tobacco Smoker     Types: Cigars    Smokeless tobacco: Current User      Comment: socially    Alcohol use 3 6 oz/week     6 Cans of beer per week      Comment: social    Drug use: No    Sexual activity: Not on file     Other Topics Concern    Not on file     Social History Narrative    No narrative on file       Current Outpatient Prescriptions:     aspirin 81 MG tablet, Take 81 mg by mouth daily, Disp: , Rfl:     cyanocobalamin (VITAMIN B-12) 1,000 mcg tablet, Take 1,000 mcg by mouth daily, Disp: , Rfl:     cycloSPORINE non-modified (SandIMMUNE) 100 mg capsule, Take 2 caps by mouth daily, Disp: 60 capsule, Rfl: 3    ergocalciferol (VITAMIN D2) 50,000 units, Take 50,000 Units by mouth once a week  , Disp: , Rfl:     Ferrous Sulfate (IRON) 325 (65 FE) MG TABS, Take 325 mg by mouth daily  , Disp: , Rfl:     influenza inactivated quadrivalent vaccine (FLULAVAL) 0 5 ML ANALILIA, Inject 0 5 mL into the shoulder, thigh, or buttocks prior to discharge (preventative) for up to 1 dose, Disp: 1 Syringe, Rfl: 0    Multiple Vitamins-Minerals (ONE DAILY MENS) TABS, Take by mouth, Disp: , Rfl:     pantoprazole (PROTONIX) 40 mg tablet, Take 1 tablet by mouth daily in the early morning, Disp: 30 tablet, Rfl: 0    predniSONE 20 mg tablet, Take 1 tablet by mouth daily (Patient taking differently: Take 10 mg by mouth every other day  ), Disp: 60 tablet, Rfl: 0    rivaroxaban (XARELTO) 20 mg tablet, Take 1 tablet by mouth daily with breakfast, Disp: 30 tablet, Rfl: 0    valsartan (DIOVAN) 40 mg tablet, 3 tablets by mouth daily (Patient taking differently: (120 mg A day) 3 tablets by mouth daily ), Disp: 90 tablet, Rfl: 3  Allergies   Allergen Reactions    Iodinated Diagnostic Agents Hives     Vitals:    03/12/18 1239   BP: 160/90   Pulse: 77   Resp: 16   Temp: 97 9 °F (36 6 °C)   SpO2: 98%         Physical Exam   Constitutional: He is oriented to person, place, and time  He appears well-developed  HENT:   Head: Normocephalic  Eyes: Pupils are equal, round, and reactive to light  Neck: Neck supple  Cardiovascular: Normal rate and regular rhythm  No murmur heard  Pulmonary/Chest: Breath sounds normal  He has no wheezes  He has no rales  Abdominal: Soft  There is no tenderness  Musculoskeletal: Normal range of motion  He exhibits no edema or tenderness  Lymphadenopathy:     He has no cervical adenopathy  Neurological: He is alert and oriented to person, place, and time  He has normal reflexes  No cranial nerve deficit  Skin: No rash noted  No erythema  Psychiatric: He has a normal mood and affect  His behavior is normal            Performance Status: ECOG/Zubrod/WHO: 1 - Symptomatic but completely ambulatory    Labs:  CBC, Coags, BMP, Mg, Phos     Imaging  No results found  I reviewed the above laboratory and imaging data  Discussion/Summary:  In summary, this is a 76-year-old male history of autoimmune hemolytic anemia, refractory  He has been on cyclosporin 300 mg  Altogether he has been on this for about 3 months now  Hemolysis and anemia continue by depressed hemoglobin and elevated bilirubin  Cyclosporin discontinuation is recommended    I asked him to stop this effective immediately, decreased Diovan to 80 mg p o  daily for 2 days and then discontinue altogether  We reviewed that there is no clear optimal path to move forward  Small reports have indicated potential efficacy of Cytoxan, CellCept, others  A recent paper indicated good efficacy with Cytoxan at 1000 mg flat dose, Rituxan 375 mg/m2, high-dose dexamethasone  He had been on high-dose dexamethasone in the past and tolerated this quite poorly with mental status change, agitation, etc   My inclination is to proceed with Cytoxan 500 mg/m2, Rituxan 375 per meter squared and prednisone 80 mg p o  q a m  day 2-6  Return to 20 mg prednisone dose is recommended thereafter  He will be receiving steroids with his antiemetics on day 1  We reviewed potential toxicities including but not limited to fatigue, cytopenias, risk for urinary bladder irritation, hematuria, etc   Prophylactic measures are taken routinely as well as monitoring to allow for early intervention is appropriate  Oral hydration is recommended  Lastly, we discussed risks of chronic transfusion therapy  Generally, these include aloe immunization as well as transfusion related iron overload  Ferritin is to be accomplished  Aloe immunization is minimized by minimal adequate transfusion therapy  I reviewed the above considerations at length with the patient and his wife  They voiced understanding and agreement

## 2018-03-14 ENCOUNTER — APPOINTMENT (OUTPATIENT)
Dept: LAB | Facility: MEDICAL CENTER | Age: 63
End: 2018-03-14
Payer: COMMERCIAL

## 2018-03-14 ENCOUNTER — TRANSCRIBE ORDERS (OUTPATIENT)
Dept: ADMINISTRATIVE | Facility: HOSPITAL | Age: 63
End: 2018-03-14

## 2018-03-14 ENCOUNTER — TELEPHONE (OUTPATIENT)
Dept: HEMATOLOGY ONCOLOGY | Facility: CLINIC | Age: 63
End: 2018-03-14

## 2018-03-14 DIAGNOSIS — D59.10 ANEMIA, AUTOIMMUNE HEMOLYTIC (HCC): ICD-10-CM

## 2018-03-14 LAB
ALBUMIN SERPL BCP-MCNC: 4 G/DL (ref 3.5–5)
ALP SERPL-CCNC: 66 U/L (ref 46–116)
ALT SERPL W P-5'-P-CCNC: 23 U/L (ref 12–78)
ANION GAP SERPL CALCULATED.3IONS-SCNC: 7 MMOL/L (ref 4–13)
ANISOCYTOSIS BLD QL SMEAR: PRESENT
AST SERPL W P-5'-P-CCNC: 18 U/L (ref 5–45)
BASOPHILS # BLD MANUAL: 0 THOUSAND/UL (ref 0–0.1)
BASOPHILS NFR MAR MANUAL: 0 % (ref 0–1)
BILIRUB SERPL-MCNC: 4.01 MG/DL (ref 0.2–1)
BUN SERPL-MCNC: 22 MG/DL (ref 5–25)
CALCIUM SERPL-MCNC: 8.7 MG/DL (ref 8.3–10.1)
CHLORIDE SERPL-SCNC: 109 MMOL/L (ref 100–108)
CO2 SERPL-SCNC: 26 MMOL/L (ref 21–32)
CREAT SERPL-MCNC: 0.97 MG/DL (ref 0.6–1.3)
EOSINOPHIL # BLD MANUAL: 0.14 THOUSAND/UL (ref 0–0.4)
EOSINOPHIL NFR BLD MANUAL: 1 % (ref 0–6)
ERYTHROCYTE [DISTWIDTH] IN BLOOD BY AUTOMATED COUNT: 22.6 % (ref 11.6–15.1)
GFR SERPL CREATININE-BSD FRML MDRD: 83 ML/MIN/1.73SQ M
GLUCOSE P FAST SERPL-MCNC: 143 MG/DL (ref 65–99)
HCT VFR BLD AUTO: 31.8 % (ref 36.5–49.3)
HGB BLD-MCNC: 10.8 G/DL (ref 12–17)
LYMPHOCYTES # BLD AUTO: 1.8 THOUSAND/UL (ref 0.6–4.47)
LYMPHOCYTES # BLD AUTO: 13 % (ref 14–44)
MCH RBC QN AUTO: 41.1 PG (ref 26.8–34.3)
MCHC RBC AUTO-ENTMCNC: 34 G/DL (ref 31.4–37.4)
MCV RBC AUTO: 121 FL (ref 82–98)
METAMYELOCYTES NFR BLD MANUAL: 3 % (ref 0–1)
MONOCYTES # BLD AUTO: 0.83 THOUSAND/UL (ref 0–1.22)
MONOCYTES NFR BLD: 6 % (ref 4–12)
NEUTROPHILS # BLD MANUAL: 10.66 THOUSAND/UL (ref 1.85–7.62)
NEUTS BAND NFR BLD MANUAL: 1 % (ref 0–8)
NEUTS SEG NFR BLD AUTO: 76 % (ref 43–75)
NRBC BLD AUTO-RTO: 1 /100 WBC (ref 0–2)
NRBC BLD AUTO-RTO: 1 /100 WBCS
PLATELET # BLD AUTO: 505 THOUSANDS/UL (ref 149–390)
PLATELET BLD QL SMEAR: ABNORMAL
PMV BLD AUTO: 10.3 FL (ref 8.9–12.7)
POIKILOCYTOSIS BLD QL SMEAR: PRESENT
POLYCHROMASIA BLD QL SMEAR: PRESENT
POTASSIUM SERPL-SCNC: 3.2 MMOL/L (ref 3.5–5.3)
PROT SERPL-MCNC: 6.8 G/DL (ref 6.4–8.2)
RBC # BLD AUTO: 2.63 MILLION/UL (ref 3.88–5.62)
RBC MORPH BLD: PRESENT
SODIUM SERPL-SCNC: 142 MMOL/L (ref 136–145)
WBC # BLD AUTO: 13.84 THOUSAND/UL (ref 4.31–10.16)

## 2018-03-14 PROCEDURE — 36415 COLL VENOUS BLD VENIPUNCTURE: CPT

## 2018-03-14 PROCEDURE — 80053 COMPREHEN METABOLIC PANEL: CPT

## 2018-03-14 PROCEDURE — 85007 BL SMEAR W/DIFF WBC COUNT: CPT

## 2018-03-14 PROCEDURE — 85027 COMPLETE CBC AUTOMATED: CPT

## 2018-03-14 NOTE — TELEPHONE ENCOUNTER
req that we call him back with the results of the bloodwork that was done today    Results are in EPIC but they are abnormal

## 2018-03-14 NOTE — TELEPHONE ENCOUNTER
Labs reviewed with patient - no new orders at this time    CBC to be repeated next week - Treatment to begin on Tuesday 3/20/18

## 2018-03-19 RX ORDER — ACETAMINOPHEN 325 MG/1
650 TABLET ORAL ONCE
Status: COMPLETED | OUTPATIENT
Start: 2018-03-20 | End: 2018-03-20

## 2018-03-19 RX ORDER — SODIUM CHLORIDE 9 MG/ML
20 INJECTION, SOLUTION INTRAVENOUS CONTINUOUS
Status: DISCONTINUED | OUTPATIENT
Start: 2018-03-20 | End: 2018-03-23 | Stop reason: HOSPADM

## 2018-03-20 ENCOUNTER — HOSPITAL ENCOUNTER (OUTPATIENT)
Dept: INFUSION CENTER | Facility: CLINIC | Age: 63
Discharge: HOME | End: 2018-03-20
Payer: COMMERCIAL

## 2018-03-20 VITALS
WEIGHT: 203.71 LBS | HEIGHT: 68 IN | TEMPERATURE: 98.4 F | DIASTOLIC BLOOD PRESSURE: 63 MMHG | SYSTOLIC BLOOD PRESSURE: 135 MMHG | RESPIRATION RATE: 16 BRPM | HEART RATE: 84 BPM | BODY MASS INDEX: 30.87 KG/M2

## 2018-03-20 DIAGNOSIS — T45.1X5A CHEMOTHERAPY INDUCED NAUSEA AND VOMITING: Primary | ICD-10-CM

## 2018-03-20 DIAGNOSIS — R11.2 CHEMOTHERAPY INDUCED NAUSEA AND VOMITING: Primary | ICD-10-CM

## 2018-03-20 LAB
ABO GROUP BLD: NORMAL
ALBUMIN SERPL BCP-MCNC: 4.2 G/DL (ref 3.2–5)
ALP SERPL-CCNC: 70 U/L (ref 46–116)
ALT SERPL W P-5'-P-CCNC: 26 U/L (ref 12–78)
ANION GAP SERPL CALCULATED.3IONS-SCNC: 11 MMOL/L (ref 4–13)
ANISOCYTOSIS BLD QL SMEAR: PRESENT
AST SERPL W P-5'-P-CCNC: 43 U/L (ref 5–45)
BASOPHILS # BLD AUTO: 0 THOUSAND/UL (ref 0–0.1)
BASOPHILS NFR MAR MANUAL: 0 % (ref 0–1)
BILIRUB SERPL-MCNC: 6.8 MG/DL (ref 0.2–1)
BLD GP AB SCN SERPL QL: NEGATIVE
BUN SERPL-MCNC: 20 MG/DL (ref 5–25)
CALCIUM SERPL-MCNC: 9.3 MG/DL (ref 8.3–10.1)
CHLORIDE SERPL-SCNC: 103 MMOL/L (ref 98–108)
CO2 SERPL-SCNC: 27 MMOL/L (ref 21–32)
CREAT SERPL-MCNC: 1.1 MG/DL (ref 0.6–1.3)
EOSINOPHIL # BLD AUTO: 0.32 THOUSAND/UL (ref 0–0.61)
EOSINOPHIL NFR BLD MANUAL: 2 % (ref 0–6)
ERYTHROCYTE [DISTWIDTH] IN BLOOD BY AUTOMATED COUNT: 23 % (ref 11.6–15.1)
FERRITIN SERPL-MCNC: 3028 NG/ML (ref 8–388)
GFR SERPL CREATININE-BSD FRML MDRD: 71 ML/MIN/1.73SQ M
GLUCOSE SERPL-MCNC: 109 MG/DL (ref 65–140)
HCT VFR BLD AUTO: 25.8 % (ref 36.5–49.3)
HGB BLD-MCNC: 8.3 G/DL (ref 12–17)
LYMPHOCYTES # BLD AUTO: 18 % (ref 14–44)
LYMPHOCYTES # BLD AUTO: 2.89 THOUSAND/UL (ref 0.6–4.47)
MCH RBC QN AUTO: 39.4 PG (ref 26.8–34.3)
MCHC RBC AUTO-ENTMCNC: 32.4 G/DL (ref 31.4–37.4)
MCV RBC AUTO: 122 FL (ref 82–98)
MONOCYTES # BLD AUTO: 0.64 THOUSAND/UL (ref 0–1.22)
MONOCYTES NFR BLD AUTO: 4 % (ref 4–12)
NEUTS BAND NFR BLD MANUAL: 4 % (ref 0–8)
NEUTS SEG # BLD: 12.21 THOUSAND/UL (ref 1.81–6.82)
NEUTS SEG NFR BLD AUTO: 72 % (ref 43–75)
NRBC BLD AUTO-RTO: 7 /100 WBC (ref 0–2)
PLATELET # BLD AUTO: 571 THOUSANDS/UL (ref 149–390)
PLATELET BLD QL SMEAR: ABNORMAL
PMV BLD AUTO: 7.8 FL (ref 8.9–12.7)
POLYCHROMASIA BLD QL SMEAR: PRESENT
POTASSIUM SERPL-SCNC: 4 MMOL/L (ref 3.5–5.3)
PROT SERPL-MCNC: 6.2 G/DL (ref 6.4–8.2)
RBC # BLD AUTO: 2.12 MILLION/UL (ref 3.88–5.62)
RBC MORPH BLD: PRESENT
RH BLD: POSITIVE
SODIUM SERPL-SCNC: 141 MMOL/L (ref 136–145)
SPECIMEN EXPIRATION DATE: NORMAL
TOTAL CELLS COUNTED SPEC: 100
WBC # BLD AUTO: 17.2 THOUSAND/UL (ref 4.31–10.16)
WBC NRBC COR # BLD: 16.07 THOUSAND/UL (ref 4.31–10.16)
WBC NRBC COR # BLD: 17.2 THOUSAND/UL (ref 4.31–10.16)

## 2018-03-20 PROCEDURE — 86921 COMPATIBILITY TEST INCUBATE: CPT

## 2018-03-20 PROCEDURE — 85007 BL SMEAR W/DIFF WBC COUNT: CPT | Performed by: INTERNAL MEDICINE

## 2018-03-20 PROCEDURE — 85027 COMPLETE CBC AUTOMATED: CPT | Performed by: INTERNAL MEDICINE

## 2018-03-20 PROCEDURE — 86850 RBC ANTIBODY SCREEN: CPT | Performed by: INTERNAL MEDICINE

## 2018-03-20 PROCEDURE — 80053 COMPREHEN METABOLIC PANEL: CPT | Performed by: INTERNAL MEDICINE

## 2018-03-20 PROCEDURE — 96417 CHEMO IV INFUS EACH ADDL SEQ: CPT

## 2018-03-20 PROCEDURE — 82728 ASSAY OF FERRITIN: CPT | Performed by: INTERNAL MEDICINE

## 2018-03-20 PROCEDURE — 96367 TX/PROPH/DG ADDL SEQ IV INF: CPT

## 2018-03-20 PROCEDURE — 86900 BLOOD TYPING SEROLOGIC ABO: CPT | Performed by: INTERNAL MEDICINE

## 2018-03-20 PROCEDURE — 96415 CHEMO IV INFUSION ADDL HR: CPT

## 2018-03-20 PROCEDURE — 96375 TX/PRO/DX INJ NEW DRUG ADDON: CPT

## 2018-03-20 PROCEDURE — 86922 COMPATIBILITY TEST ANTIGLOB: CPT

## 2018-03-20 PROCEDURE — 96413 CHEMO IV INFUSION 1 HR: CPT

## 2018-03-20 PROCEDURE — 86901 BLOOD TYPING SEROLOGIC RH(D): CPT | Performed by: INTERNAL MEDICINE

## 2018-03-20 RX ORDER — PROCHLORPERAZINE MALEATE 10 MG
10 TABLET ORAL EVERY 6 HOURS PRN
Qty: 30 TABLET | Refills: 0 | Status: SHIPPED | OUTPATIENT
Start: 2018-03-20 | End: 2018-03-29

## 2018-03-20 RX ADMIN — ACETAMINOPHEN 650 MG: 325 TABLET, FILM COATED ORAL at 08:56

## 2018-03-20 RX ADMIN — RITUXIMAB 772 MG: 10 INJECTION, SOLUTION INTRAVENOUS at 09:41

## 2018-03-20 RX ADMIN — SODIUM CHLORIDE 20 ML/HR: 0.9 INJECTION, SOLUTION INTRAVENOUS at 08:30

## 2018-03-20 RX ADMIN — DIPHENHYDRAMINE HYDROCHLORIDE 25 MG: 50 INJECTION, SOLUTION INTRAMUSCULAR; INTRAVENOUS at 09:13

## 2018-03-20 RX ADMIN — DEXAMETHASONE SODIUM PHOSPHATE: 10 INJECTION, SOLUTION INTRAMUSCULAR; INTRAVENOUS at 08:55

## 2018-03-20 RX ADMIN — CYCLOPHOSPHAMIDE 1000 MG: 1 INJECTION, POWDER, FOR SOLUTION INTRAVENOUS; ORAL at 12:39

## 2018-03-20 RX ADMIN — SODIUM CHLORIDE 1000 ML: 0.9 INJECTION, SOLUTION INTRAVENOUS at 09:45

## 2018-03-20 NOTE — PLAN OF CARE
Problem: PAIN - ADULT  Goal: Verbalizes/displays adequate comfort level or baseline comfort level  Interventions:  - Encourage patient to monitor pain and request assistance  - Assess pain using appropriate pain scale  - Administer analgesics based on type and severity of pain and evaluate response  - Implement non-pharmacological measures as appropriate and evaluate response  - Consider cultural and social influences on pain and pain management  - Notify physician/advanced practitioner if interventions unsuccessful or patient reports new pain  Outcome: Progressing      Problem: INFECTION - ADULT  Goal: Absence or prevention of progression during hospitalization  INTERVENTIONS:  - Assess and monitor for signs and symptoms of infection  - Monitor lab/diagnostic results  - Monitor all insertion sites, i e  indwelling lines, tubes, and drains  - Monitor endotracheal (as able) and nasal secretions for changes in amount and color  - [place] appropriate cooling/warming therapies per order  - Administer medications as ordered  - Instruct and encourage patient and family to use good hand hygiene technique  - Identify and instruct in appropriate isolation precautions for identified infection/condition  Outcome: Progressing    Goal: Absence of fever/infection during neutropenic period  INTERVENTIONS:  - Monitor WBC  - Implement neutropenic guidelines  Outcome: Progressing      Problem: Knowledge Deficit  Goal: Patient/family/caregiver demonstrates understanding of disease process, treatment plan, medications, and discharge instructions  Complete learning assessment and assess knowledge base    Interventions:  - Provide teaching at level of understanding  - Provide teaching via preferred learning methods  Outcome: Progressing

## 2018-03-20 NOTE — PROGRESS NOTES
Pt  Denies new symptoms or concerns at this time  Labs obtained as ordered today  K+ 3 2 from 3/14/18  Pt  Denies taking supplemental Potassium at this time  RN will wait results from Labs today before calling physicians office  Other Labs from 3/14/18 ok to proceed with chemotherapy per parameters

## 2018-03-20 NOTE — PROGRESS NOTES
Pt  Tolerated Rituxan and Cytoxan without adverse event  Future appointments reviewed  AVS provided  Spoke with Brett Orourke RN requesting anti-emetic for patient at his request  Continue to wait for transfusion orders

## 2018-03-23 ENCOUNTER — HOSPITAL ENCOUNTER (OUTPATIENT)
Dept: INFUSION CENTER | Facility: CLINIC | Age: 63
Discharge: HOME | End: 2018-03-23
Payer: COMMERCIAL

## 2018-03-23 VITALS
SYSTOLIC BLOOD PRESSURE: 152 MMHG | DIASTOLIC BLOOD PRESSURE: 80 MMHG | HEART RATE: 80 BPM | RESPIRATION RATE: 16 BRPM | TEMPERATURE: 98 F

## 2018-03-23 PROCEDURE — P9040 RBC LEUKOREDUCED IRRADIATED: HCPCS

## 2018-03-23 PROCEDURE — 86902 BLOOD TYPE ANTIGEN DONOR EA: CPT

## 2018-03-23 PROCEDURE — 36430 TRANSFUSION BLD/BLD COMPNT: CPT

## 2018-03-23 NOTE — PLAN OF CARE
Problem: PAIN - ADULT  Goal: Verbalizes/displays adequate comfort level or baseline comfort level  Interventions:  - Encourage patient to monitor pain and request assistance  - Assess pain using appropriate pain scale  - Administer analgesics based on type and severity of pain and evaluate response  - Implement non-pharmacological measures as appropriate and evaluate response  - Consider cultural and social influences on pain and pain management  - Notify physician/advanced practitioner if interventions unsuccessful or patient reports new pain   Outcome: Progressing      Problem: INFECTION - ADULT  Goal: Absence or prevention of progression during hospitalization  INTERVENTIONS:  - Assess and monitor for signs and symptoms of infection  - Monitor lab/diagnostic results  - Monitor all insertion sites, i e  indwelling lines, tubes, and drains  - Monitor endotracheal (as able) and nasal secretions for changes in amount and color  - [place] appropriate cooling/warming therapies per order  - Administer medications as ordered  - Instruct and encourage patient and family to use good hand hygiene technique  - Identify and instruct in appropriate isolation precautions for identified infection/condition   Outcome: Progressing    Goal: Absence of fever/infection during neutropenic period  INTERVENTIONS:  - Monitor WBC  - Implement neutropenic guidelines   Outcome: Progressing      Problem: Knowledge Deficit  Goal: Patient/family/caregiver demonstrates understanding of disease process, treatment plan, medications, and discharge instructions  Complete learning assessment and assess knowledge base    Interventions:  - Provide teaching at level of understanding  - Provide teaching via preferred learning methods   Outcome: Progressing

## 2018-03-23 NOTE — PROGRESS NOTES
Pt  Regine Castor ongoing fatigue and mild LOWE; Pt  States he is able to perform most activities, but also sleeping often  Headache and dizziness also reported intermittantly  Pt  Also verbalizes poor appetite  2 units of PRBCs ordered for infusion today

## 2018-03-28 ENCOUNTER — TELEPHONE (OUTPATIENT)
Dept: HEMATOLOGY ONCOLOGY | Facility: CLINIC | Age: 63
End: 2018-03-28

## 2018-03-28 NOTE — TELEPHONE ENCOUNTER
Spoke with patient today - He reports fever 102 with chills, fatigue, SOB at times and headache  Suggested he have CBC checked today - he is unable to make it and will go first thing tomorrow am   Reinforced the importance that he go to the ER for evaluation if fever does not break or symptoms get worse    He will use Tylenol PRN for fever and will call with any additional questions or concerns

## 2018-03-29 ENCOUNTER — APPOINTMENT (EMERGENCY)
Dept: RADIOLOGY | Facility: HOSPITAL | Age: 63
DRG: 872 | End: 2018-03-29
Payer: COMMERCIAL

## 2018-03-29 ENCOUNTER — APPOINTMENT (INPATIENT)
Dept: CT IMAGING | Facility: HOSPITAL | Age: 63
DRG: 872 | End: 2018-03-29
Payer: COMMERCIAL

## 2018-03-29 ENCOUNTER — TELEPHONE (OUTPATIENT)
Dept: HEMATOLOGY ONCOLOGY | Facility: CLINIC | Age: 63
End: 2018-03-29

## 2018-03-29 ENCOUNTER — HOSPITAL ENCOUNTER (INPATIENT)
Facility: HOSPITAL | Age: 63
LOS: 3 days | Discharge: HOME | DRG: 872 | End: 2018-04-01
Attending: EMERGENCY MEDICINE | Admitting: INTERNAL MEDICINE
Payer: COMMERCIAL

## 2018-03-29 DIAGNOSIS — D73.81: ICD-10-CM

## 2018-03-29 DIAGNOSIS — A04.72 C. DIFFICILE ENTERITIS: ICD-10-CM

## 2018-03-29 DIAGNOSIS — R50.9 FEVER: ICD-10-CM

## 2018-03-29 DIAGNOSIS — I26.99 OTHER ACUTE PULMONARY EMBOLISM WITHOUT ACUTE COR PULMONALE (HCC): Primary | ICD-10-CM

## 2018-03-29 DIAGNOSIS — D59.11 HEMOLYTIC ANEMIA DUE TO WARM ANTIBODY (HCC): ICD-10-CM

## 2018-03-29 DIAGNOSIS — A41.9 SEPSIS (HCC): ICD-10-CM

## 2018-03-29 DIAGNOSIS — R79.89 ELEVATED LACTIC ACID LEVEL: ICD-10-CM

## 2018-03-29 DIAGNOSIS — R79.89 ELEVATED BRAIN NATRIURETIC PEPTIDE (BNP) LEVEL: ICD-10-CM

## 2018-03-29 DIAGNOSIS — R19.7 DIARRHEA: ICD-10-CM

## 2018-03-29 DIAGNOSIS — Z72.0 TOBACCO ABUSE: Primary | ICD-10-CM

## 2018-03-29 DIAGNOSIS — N17.9 AKI (ACUTE KIDNEY INJURY) (HCC): ICD-10-CM

## 2018-03-29 PROBLEM — R65.20 SEVERE SEPSIS (HCC): Status: ACTIVE | Noted: 2018-03-29

## 2018-03-29 LAB
ALBUMIN SERPL BCP-MCNC: 3.7 G/DL (ref 3.5–5)
ALP SERPL-CCNC: 94 U/L (ref 46–116)
ALT SERPL W P-5'-P-CCNC: 83 U/L (ref 12–78)
ANION GAP SERPL CALCULATED.3IONS-SCNC: 13 MMOL/L (ref 4–13)
ANISOCYTOSIS BLD QL SMEAR: PRESENT
APTT PPP: 29 SECONDS (ref 23–35)
AST SERPL W P-5'-P-CCNC: 52 U/L (ref 5–45)
ATRIAL RATE: 100 BPM
BACTERIA UR QL AUTO: ABNORMAL /HPF
BACTERIA UR QL AUTO: ABNORMAL /HPF
BASOPHILS # BLD MANUAL: 0 THOUSAND/UL (ref 0–0.1)
BASOPHILS NFR MAR MANUAL: 0 % (ref 0–1)
BILIRUB SERPL-MCNC: 8.82 MG/DL (ref 0.2–1)
BILIRUB UR QL STRIP: NEGATIVE
BILIRUB UR QL STRIP: NEGATIVE
BUN SERPL-MCNC: 32 MG/DL (ref 5–25)
CALCIUM SERPL-MCNC: 8.8 MG/DL (ref 8.3–10.1)
CHLORIDE SERPL-SCNC: 97 MMOL/L (ref 100–108)
CLARITY UR: CLEAR
CLARITY UR: CLEAR
CO2 SERPL-SCNC: 25 MMOL/L (ref 21–32)
COLOR UR: ABNORMAL
COLOR UR: YELLOW
CREAT SERPL-MCNC: 2.31 MG/DL (ref 0.6–1.3)
EOSINOPHIL # BLD MANUAL: 0.06 THOUSAND/UL (ref 0–0.4)
EOSINOPHIL NFR BLD MANUAL: 2 % (ref 0–6)
ERYTHROCYTE [DISTWIDTH] IN BLOOD BY AUTOMATED COUNT: 20.8 % (ref 11.6–15.1)
GFR SERPL CREATININE-BSD FRML MDRD: 29 ML/MIN/1.73SQ M
GLUCOSE SERPL-MCNC: 122 MG/DL (ref 65–140)
GLUCOSE UR STRIP-MCNC: NEGATIVE MG/DL
GLUCOSE UR STRIP-MCNC: NEGATIVE MG/DL
HCT VFR BLD AUTO: 29.2 % (ref 36.5–49.3)
HGB BLD-MCNC: 9.4 G/DL (ref 12–17)
HGB UR QL STRIP.AUTO: NEGATIVE
HGB UR QL STRIP.AUTO: NORMAL
IGA SERPL-MCNC: 63 MG/DL (ref 70–400)
IGG SERPL-MCNC: 457 MG/DL (ref 700–1600)
IGM SERPL-MCNC: 40 MG/DL (ref 40–230)
INR PPP: 1.59 (ref 0.86–1.16)
KETONES UR STRIP-MCNC: NEGATIVE MG/DL
KETONES UR STRIP-MCNC: NEGATIVE MG/DL
LACTATE SERPL-SCNC: 1.7 MMOL/L (ref 0.5–2)
LACTATE SERPL-SCNC: 2.6 MMOL/L (ref 0.5–2)
LEUKOCYTE ESTERASE UR QL STRIP: NEGATIVE
LEUKOCYTE ESTERASE UR QL STRIP: NEGATIVE
LYMPHOCYTES # BLD AUTO: 1.19 THOUSAND/UL (ref 0.6–4.47)
LYMPHOCYTES # BLD AUTO: 38 % (ref 14–44)
MCH RBC QN AUTO: 40.3 PG (ref 26.8–34.3)
MCHC RBC AUTO-ENTMCNC: 32.2 G/DL (ref 31.4–37.4)
MCV RBC AUTO: 125 FL (ref 82–98)
MONOCYTES # BLD AUTO: 1.57 THOUSAND/UL (ref 0–1.22)
MONOCYTES NFR BLD: 50 % (ref 4–12)
MYELOCYTES NFR BLD MANUAL: 1 % (ref 0–1)
NEUTROPHILS # BLD MANUAL: 0.25 THOUSAND/UL (ref 1.85–7.62)
NEUTS BAND NFR BLD MANUAL: 5 % (ref 0–8)
NEUTS SEG NFR BLD AUTO: 3 % (ref 43–75)
NITRITE UR QL STRIP: NEGATIVE
NITRITE UR QL STRIP: NEGATIVE
NON-SQ EPI CELLS URNS QL MICRO: ABNORMAL /HPF
NON-SQ EPI CELLS URNS QL MICRO: ABNORMAL /HPF
NRBC BLD AUTO-RTO: 7 /100 WBC (ref 0–2)
NRBC BLD AUTO-RTO: 9 /100 WBCS
NT-PROBNP SERPL-MCNC: 3873 PG/ML
P AXIS: 61 DEGREES
PH UR STRIP.AUTO: 5.5 [PH] (ref 4.5–8)
PH UR STRIP.AUTO: 6 [PH] (ref 4.5–8)
PLATELET # BLD AUTO: 312 THOUSANDS/UL (ref 149–390)
PLATELET BLD QL SMEAR: ABNORMAL
PMV BLD AUTO: 10.2 FL (ref 8.9–12.7)
POIKILOCYTOSIS BLD QL SMEAR: PRESENT
POLYCHROMASIA BLD QL SMEAR: PRESENT
POTASSIUM SERPL-SCNC: 3.7 MMOL/L (ref 3.5–5.3)
PR INTERVAL: 136 MS
PROMYELOCYTES NFR BLD MANUAL: 1 % (ref 0–0)
PROT SERPL-MCNC: 6.6 G/DL (ref 6.4–8.2)
PROT UR STRIP-MCNC: ABNORMAL MG/DL
PROT UR STRIP-MCNC: NEGATIVE MG/DL
PROTHROMBIN TIME: 19.1 SECONDS (ref 12.1–14.4)
QRS AXIS: 61 DEGREES
QRSD INTERVAL: 78 MS
QT INTERVAL: 334 MS
QTC INTERVAL: 430 MS
RBC # BLD AUTO: 2.33 MILLION/UL (ref 3.88–5.62)
RBC #/AREA URNS AUTO: ABNORMAL /HPF
RBC #/AREA URNS AUTO: ABNORMAL /HPF
RBC MORPH BLD: PRESENT
SODIUM SERPL-SCNC: 135 MMOL/L (ref 136–145)
SP GR UR STRIP.AUTO: <=1.005 (ref 1–1.03)
SP GR UR STRIP.AUTO: <=1.005 (ref 1–1.03)
T WAVE AXIS: 50 DEGREES
TOTAL CELLS COUNTED SPEC: 100
TROPONIN I SERPL-MCNC: <0.02 NG/ML
UROBILINOGEN UR QL STRIP.AUTO: 0.2 E.U./DL
UROBILINOGEN UR QL STRIP.AUTO: 0.2 E.U./DL
VENTRICULAR RATE: 100 BPM
WBC # BLD AUTO: 3.14 THOUSAND/UL (ref 4.31–10.16)
WBC #/AREA URNS AUTO: ABNORMAL /HPF
WBC #/AREA URNS AUTO: ABNORMAL /HPF

## 2018-03-29 PROCEDURE — 80053 COMPREHEN METABOLIC PANEL: CPT | Performed by: EMERGENCY MEDICINE

## 2018-03-29 PROCEDURE — 85730 THROMBOPLASTIN TIME PARTIAL: CPT | Performed by: EMERGENCY MEDICINE

## 2018-03-29 PROCEDURE — 81001 URINALYSIS AUTO W/SCOPE: CPT | Performed by: NURSE PRACTITIONER

## 2018-03-29 PROCEDURE — 93010 ELECTROCARDIOGRAM REPORT: CPT | Performed by: INTERNAL MEDICINE

## 2018-03-29 PROCEDURE — 36415 COLL VENOUS BLD VENIPUNCTURE: CPT

## 2018-03-29 PROCEDURE — 82784 ASSAY IGA/IGD/IGG/IGM EACH: CPT | Performed by: NURSE PRACTITIONER

## 2018-03-29 PROCEDURE — 99253 IP/OBS CNSLTJ NEW/EST LOW 45: CPT | Performed by: INTERNAL MEDICINE

## 2018-03-29 PROCEDURE — 83880 ASSAY OF NATRIURETIC PEPTIDE: CPT | Performed by: EMERGENCY MEDICINE

## 2018-03-29 PROCEDURE — 96361 HYDRATE IV INFUSION ADD-ON: CPT

## 2018-03-29 PROCEDURE — 87493 C DIFF AMPLIFIED PROBE: CPT | Performed by: NURSE PRACTITIONER

## 2018-03-29 PROCEDURE — 87040 BLOOD CULTURE FOR BACTERIA: CPT | Performed by: EMERGENCY MEDICINE

## 2018-03-29 PROCEDURE — 93005 ELECTROCARDIOGRAM TRACING: CPT

## 2018-03-29 PROCEDURE — 81001 URINALYSIS AUTO W/SCOPE: CPT

## 2018-03-29 PROCEDURE — 99285 EMERGENCY DEPT VISIT HI MDM: CPT

## 2018-03-29 PROCEDURE — 85027 COMPLETE CBC AUTOMATED: CPT | Performed by: EMERGENCY MEDICINE

## 2018-03-29 PROCEDURE — 94640 AIRWAY INHALATION TREATMENT: CPT

## 2018-03-29 PROCEDURE — 99223 1ST HOSP IP/OBS HIGH 75: CPT | Performed by: INTERNAL MEDICINE

## 2018-03-29 PROCEDURE — 96365 THER/PROPH/DIAG IV INF INIT: CPT

## 2018-03-29 PROCEDURE — 74176 CT ABD & PELVIS W/O CONTRAST: CPT

## 2018-03-29 PROCEDURE — 71045 X-RAY EXAM CHEST 1 VIEW: CPT

## 2018-03-29 PROCEDURE — 85007 BL SMEAR W/DIFF WBC COUNT: CPT | Performed by: EMERGENCY MEDICINE

## 2018-03-29 PROCEDURE — 84484 ASSAY OF TROPONIN QUANT: CPT | Performed by: NURSE PRACTITIONER

## 2018-03-29 PROCEDURE — 87798 DETECT AGENT NOS DNA AMP: CPT | Performed by: NURSE PRACTITIONER

## 2018-03-29 PROCEDURE — 85610 PROTHROMBIN TIME: CPT | Performed by: EMERGENCY MEDICINE

## 2018-03-29 PROCEDURE — 83605 ASSAY OF LACTIC ACID: CPT | Performed by: EMERGENCY MEDICINE

## 2018-03-29 PROCEDURE — 84484 ASSAY OF TROPONIN QUANT: CPT | Performed by: EMERGENCY MEDICINE

## 2018-03-29 RX ORDER — ERGOCALCIFEROL 1.25 MG/1
50000 CAPSULE ORAL WEEKLY
Status: DISCONTINUED | OUTPATIENT
Start: 2018-03-29 | End: 2018-04-01 | Stop reason: HOSPADM

## 2018-03-29 RX ORDER — NICOTINE 21 MG/24HR
14 PATCH, TRANSDERMAL 24 HOURS TRANSDERMAL DAILY
Status: DISCONTINUED | OUTPATIENT
Start: 2018-03-29 | End: 2018-04-01 | Stop reason: HOSPADM

## 2018-03-29 RX ORDER — PREDNISONE 20 MG/1
20 TABLET ORAL DAILY
Status: DISCONTINUED | OUTPATIENT
Start: 2018-03-29 | End: 2018-04-01 | Stop reason: HOSPADM

## 2018-03-29 RX ORDER — CEFEPIME HYDROCHLORIDE 2 G/1
1000 INJECTION, POWDER, FOR SOLUTION INTRAVENOUS ONCE
Status: DISCONTINUED | OUTPATIENT
Start: 2018-03-29 | End: 2018-03-29 | Stop reason: CLARIF

## 2018-03-29 RX ORDER — ASPIRIN 81 MG/1
81 TABLET, CHEWABLE ORAL DAILY
Status: DISCONTINUED | OUTPATIENT
Start: 2018-03-29 | End: 2018-04-01 | Stop reason: HOSPADM

## 2018-03-29 RX ORDER — FERROUS SULFATE 325(65) MG
325 TABLET ORAL DAILY
Status: DISCONTINUED | OUTPATIENT
Start: 2018-03-29 | End: 2018-03-31

## 2018-03-29 RX ORDER — CHOLECALCIFEROL (VITAMIN D3) 125 MCG
1000 CAPSULE ORAL DAILY
Status: DISCONTINUED | OUTPATIENT
Start: 2018-03-29 | End: 2018-03-31

## 2018-03-29 RX ORDER — POTASSIUM CHLORIDE 20 MEQ/1
40 TABLET, EXTENDED RELEASE ORAL ONCE
Status: COMPLETED | OUTPATIENT
Start: 2018-03-29 | End: 2018-03-29

## 2018-03-29 RX ORDER — SODIUM CHLORIDE 9 MG/ML
75 INJECTION, SOLUTION INTRAVENOUS CONTINUOUS
Status: DISCONTINUED | OUTPATIENT
Start: 2018-03-29 | End: 2018-04-01

## 2018-03-29 RX ORDER — PANTOPRAZOLE SODIUM 40 MG/1
40 TABLET, DELAYED RELEASE ORAL
Status: DISCONTINUED | OUTPATIENT
Start: 2018-03-29 | End: 2018-04-01 | Stop reason: HOSPADM

## 2018-03-29 RX ORDER — LEVALBUTEROL 1.25 MG/.5ML
1.25 SOLUTION, CONCENTRATE RESPIRATORY (INHALATION) EVERY 8 HOURS PRN
Status: DISCONTINUED | OUTPATIENT
Start: 2018-03-29 | End: 2018-04-01 | Stop reason: HOSPADM

## 2018-03-29 RX ADMIN — SODIUM CHLORIDE 660 ML: 0.9 INJECTION, SOLUTION INTRAVENOUS at 11:51

## 2018-03-29 RX ADMIN — METRONIDAZOLE 500 MG: 500 INJECTION, SOLUTION INTRAVENOUS at 20:15

## 2018-03-29 RX ADMIN — SODIUM CHLORIDE 1000 ML: 0.9 INJECTION, SOLUTION INTRAVENOUS at 09:58

## 2018-03-29 RX ADMIN — LEVALBUTEROL HYDROCHLORIDE 1.25 MG: 1.25 SOLUTION, CONCENTRATE RESPIRATORY (INHALATION) at 20:56

## 2018-03-29 RX ADMIN — SODIUM CHLORIDE 125 ML/HR: 0.9 INJECTION, SOLUTION INTRAVENOUS at 14:11

## 2018-03-29 RX ADMIN — POTASSIUM CHLORIDE 40 MEQ: 1500 TABLET, EXTENDED RELEASE ORAL at 14:19

## 2018-03-29 RX ADMIN — CEFEPIME HYDROCHLORIDE 2000 MG: 2 INJECTION, SOLUTION INTRAVENOUS at 11:14

## 2018-03-29 RX ADMIN — VANCOMYCIN HYDROCHLORIDE 1250 MG: 1 INJECTION, POWDER, LYOPHILIZED, FOR SOLUTION INTRAVENOUS at 13:38

## 2018-03-29 RX ADMIN — IOHEXOL 50 ML: 240 INJECTION, SOLUTION INTRATHECAL; INTRAVASCULAR; INTRAVENOUS; ORAL at 12:41

## 2018-03-29 RX ADMIN — SODIUM CHLORIDE 1000 ML: 0.9 INJECTION, SOLUTION INTRAVENOUS at 10:45

## 2018-03-29 RX ADMIN — TBO-FILGRASTIM 480 MCG: 480 INJECTION, SOLUTION SUBCUTANEOUS at 17:44

## 2018-03-29 NOTE — ED NOTES
Dr Rosaline Valente at [facility], [address] [place] [address]  03/29/18 1002

## 2018-03-29 NOTE — CONSULTS
Oncology Consult Note  Denzel López 60 y o  male MRN: [id number]  Unit/Bed#: ICU 06 Encounter: [id number]      Presenting Complaint:  Autoimmune hemolytic anemia, splenectomy    History of Presenting Illness:  58-year-old  male with history of autoimmune hemolytic anemia when he noticed jaundice in October 2016, fatigue, LOWE, was found to have hemoglobin of 5 7, total bilirubin 5 3, splenomegaly, , joanne +3 +4 IgG, tested negative for HIV, bone marrow biopsy was negative, he was diagnosed with autoimmune hemolytic anemia and initiated on prednisone 1 milligram/kilograms per day he had good response and prednisone was tapered down and then discontinued in January 2016, however he had relapsed disease and in 2017 he received rituximab 375 milligram/meter subcutaneously every week x4 doses, May 2017 he underwent splenectomy, biopsy showed no evidence of lymphoma however he did not have a good response with hemoglobin down to 7 6  He also had pulmonary emboli, workup was negative for PNH, G6PD elevated, initiated on rivaroxaban T-cell gene rearrangement was negative, 10 days ago he was started with rituximab and cyclophosphamide and prednisone for 5 day, he came with acute febrile illness, fever, chills, headache, darkening of the urine, he was found to be in acute renal failure, septic shock, initiated on cefepime and IV fluid with significant improvement  Hemoglobin actually is higher than before of 9 4, , WBC 3 1, platelets 198531 with 9% nucleated RBC, differential showed 3% neutrophils, 5% bands, 38% lymphocytes, 50% monocytes, 2% eosinophiles, 1% promyelocytes, with absolute neutrophil count of 0 25  Review of Systems - As stated in the HPI otherwise the fourteen point review of systems was negative      Past Medical History:   Diagnosis Date    Autoimmune hemolytic anemia (HCC)     DVT (deep venous thrombosis) (HCC)     GERD (gastroesophageal reflux disease)     [facility] )  Palpitation     Portal vein thrombosis     Pulmonary emboli (HCC)     Tobacco abuse        Social History     Social History    Marital status: /Civil Union     Spouse name: N/A    Number of children: N/A    Years of education: N/A     Social History Main Topics    Smoking status: Light Tobacco Smoker     Types: Cigars    Smokeless tobacco: Current User      Comment: socially    Alcohol use 3 6 oz/week     6 Cans of beer per week      Comment: social    Drug use: No    Sexual activity: Not Asked     Other Topics Concern    None     Social History Narrative    None       Family History   Problem Relation Age of Onset    No Known Problems Mother     No Known Problems Father        Allergies   Allergen Reactions    Iodinated Diagnostic Agents Hives         Current Facility-Administered Medications:     aspirin chewable tablet 81 mg, 81 mg, Oral, Daily, Bianka K VISHNU Mccullough    [START ON 3/30/2018] cefepime (MAXIPIME) IVPB (premix) 1,000 mg, 1,000 mg, Intravenous, Once, Bianka K VISHNU Mccullough    cyanocobalamin (VITAMIN B-12) tablet 1,000 mcg, 1,000 mcg, Oral, Daily, Namon Breath VISHNU Mccullough    ergocalciferol (VITAMIN D2) capsule 50,000 Units, 50,000 Units, Oral, Weekly, Bianka K VISHNU Mccullough    ferrous sulfate tablet 325 mg, 325 mg, Oral, Daily, Bianka K VISHNU Mccullough    nicotine (NICODERM CQ) 14 mg/24hr TD 24 hr patch 14 mg, 14 mg, Transdermal, Daily, Bianka K Sadia, JOANNENP    pantoprazole (PROTONIX) EC tablet 40 mg, 40 mg, Oral, Early Morning, Bianka VISHNU Dean    predniSONE tablet 20 mg, 20 mg, Oral, Daily, Bianka K JOANNE McculloughNP    [START ON 3/30/2018] rivaroxaban (XARELTO) tablet 20 mg, 20 mg, Oral, Daily With Breakfast, VISHNU Bajwa    sodium chloride 0 9 % infusion, 125 mL/hr, Intravenous, Continuous, Bianka K VISHNU Mccullough, Last Rate: 125 mL/hr at 03/29/18 1411, 125 mL/hr at 03/29/18 1411      /60   Pulse 96   Temp 97 9 °F (36 6 °C) (Oral)   Resp 15   Ht 5' 8" (1 727 m)   Wt 89 5 kg (197 lb 5 oz)   SpO2 97%   BMI 30 00 kg/m²       General Appearance:    Alert, oriented        Eyes:    PERRL, icterus is positive   Ears:    Normal external ear canals, both ears   Nose:   Nares normal, septum midline   Throat:   Mucosa moist  Pharynx without injection  Neck:   Supple       Lungs:     Clear to auscultation bilaterally   Chest Wall:    No tenderness or deformity    Heart:    Regular rate and rhythm       Abdomen:     Soft, non-tender, bowel sounds +, no organomegaly           Extremities:   Extremities no cyanosis or edema       Skin:   no rash or icterus      Lymph nodes:   Cervical, supraclavicular, and axillary nodes normal   Neurologic:   CNII-XII intact, normal strength, sensation and reflexes     Throughout               Recent Results (from the past 48 hour(s))   APTT    Collection Time: 03/29/18  9:47 AM   Result Value Ref Range    PTT 29 23 - 35 seconds   Protime-INR    Collection Time: 03/29/18  9:47 AM   Result Value Ref Range    Protime 19 1 (H) 12 1 - 14 4 seconds    INR 1 59 (H) 0 86 - 1 16   CBC and differential    Collection Time: 03/29/18  9:47 AM   Result Value Ref Range    WBC 3 14 (L) 4 31 - 10 16 Thousand/uL    RBC 2 33 (L) 3 88 - 5 62 Million/uL    Hemoglobin 9 4 (L) 12 0 - 17 0 g/dL    Hematocrit 29 2 (L) 36 5 - 49 3 %     (H) 82 - 98 fL    MCH 40 3 (H) 26 8 - 34 3 pg    MCHC 32 2 31 4 - 37 4 g/dL    RDW 20 8 (H) 11 6 - 15 1 %    MPV 10 2 8 9 - 12 7 fL    Platelets 282 637 - 497 Thousands/uL    nRBC 9 /100 WBCs   Comprehensive metabolic panel    Collection Time: 03/29/18  9:47 AM   Result Value Ref Range    Sodium 135 (L) 136 - 145 mmol/L    Potassium 3 7 3 5 - 5 3 mmol/L    Chloride 97 (L) 100 - 108 mmol/L    CO2 25 21 - 32 mmol/L    Anion Gap 13 4 - 13 mmol/L    BUN 32 (H) 5 - 25 mg/dL    Creatinine 2 31 (H) 0 60 - 1 30 mg/dL    Glucose 122 65 - 140 mg/dL    Calcium 8 8 8 3 - 10 1 mg/dL    AST 52 (H) 5 - 45 U/L    ALT 83 (H) 12 - 78 U/L Alkaline Phosphatase 94 46 - 116 U/L    Total Protein 6 6 6 4 - 8 2 g/dL    Albumin 3 7 3 5 - 5 0 g/dL    Total Bilirubin 8 82 (H) 0 20 - 1 00 mg/dL    eGFR 29 ml/min/1 73sq m   B-type natriuretic peptide    Collection Time: 03/29/18  9:47 AM   Result Value Ref Range    NT-proBNP 3,873 (H) <125 pg/mL   Manual Differential(PHLEBS Do Not Order)    Collection Time: 03/29/18  9:47 AM   Result Value Ref Range    Segmented % 3 (L) 43 - 75 %    Bands % 5 0 - 8 %    Lymphocytes % 38 14 - 44 %    Monocytes % 50 (H) 4 - 12 %    Eosinophils % 2 0 - 6 %    Basophils % 0 0 - 1 %    Myelocytes % 1 0 - 1 %    Promyelocytes % 1 (H) 0 - 0 %    Absolute Neutrophils 0 25 (L) 1 85 - 7 62 Thousand/uL    Lymphocytes Absolute 1 19 0 60 - 4 47 Thousand/uL    Monocytes Absolute 1 57 (H) 0 00 - 1 22 Thousand/uL    Eosinophils Absolute 0 06 0 00 - 0 40 Thousand/uL    Basophils Absolute 0 00 0 00 - 0 10 Thousand/uL    Total Counted 100     nRBC 7 (H) 0 - 2 /100 WBC    RBC Morphology Present     Anisocytosis Present     Poikilocytes Present     Polychromasia Present     Platelet Estimate Increased (A) Adequate   Lactic Acid x2    Collection Time: 03/29/18  9:54 AM   Result Value Ref Range    LACTIC ACID 2 6 (HH) 0 5 - 2 0 mmol/L   Troponin I    Collection Time: 03/29/18  9:54 AM   Result Value Ref Range    Troponin I <0 02 <=0 04 ng/mL   Lactic Acid x2    Collection Time: 03/29/18 12:18 PM   Result Value Ref Range    LACTIC ACID 1 7 0 5 - 2 0 mmol/L   ED Urine Macroscopic    Collection Time: 03/29/18  1:00 PM   Result Value Ref Range    Color, UA Xochitl     Clarity, UA Clear     pH, UA 5 5 4 5 - 8 0    Leukocytes, UA Negative Negative    Nitrite, UA Negative Negative    Protein, UA Trace (A) Negative mg/dl    Glucose, UA Negative Negative mg/dl    Ketones, UA Negative Negative mg/dl    Urobilinogen, UA 0 2 0 2, 1 0 E U /dl E U /dl    Bilirubin, UA Negative Negative    Blood, UA Negative Negative    Specific Gravity, UA <=1 005 1 003 - 1 030 Urine Microscopic    Collection Time: 03/29/18  1:00 PM   Result Value Ref Range    RBC, UA 2-4 (A) None Seen, 0-5 /hpf    WBC, UA None Seen None Seen, 0-5, 5-55, 5-65 /hpf    Epithelial Cells None Seen None Seen, Occasional /hpf    Bacteria, UA None Seen None Seen, Occasional /hpf   Troponin I    Collection Time: 03/29/18  2:08 PM   Result Value Ref Range    Troponin I <0 02 <=0 04 ng/mL   UA w Reflex to Microscopic w Reflex to Culture    Collection Time: 03/29/18  2:24 PM   Result Value Ref Range    Color, UA Yellow     Clarity, UA Clear     Specific Gravity, UA <=1 005 1 003 - 1 030    pH, UA 6 0 4 5 - 8 0    Leukocytes, UA Negative Negative    Nitrite, UA Negative Negative    Protein, UA Negative Negative mg/dl    Glucose, UA Negative Negative mg/dl    Ketones, UA Negative Negative mg/dl    Urobilinogen, UA 0 2 0 2, 1 0 E U /dl E U /dl    Bilirubin, UA Negative Negative    Blood, UA Trace-Intact Negative, Trace-Intact   Urine Microscopic    Collection Time: 03/29/18  2:24 PM   Result Value Ref Range    RBC, UA 0-1 (A) None Seen, 0-5 /hpf    WBC, UA None Seen None Seen, 0-5, 5-55, 5-65 /hpf    Epithelial Cells None Seen None Seen, Occasional /hpf    Bacteria, UA None Seen None Seen, Occasional /hpf         Ct Abdomen Pelvis Wo Contrast    Result Date: 3/29/2018  Narrative: CT ABDOMEN AND PELVIS WITHOUT IV CONTRAST INDICATION:   Abdominal pain, Diarrhea, Acute renal insufficiency  COMPARISON: CT abdomen pelvis 12/21/2017, MRI abdomen 12/22/2017 TECHNIQUE:  CT examination of the abdomen and pelvis was performed without intravenous contrast   Axial, sagittal, and coronal 2D reformatted images were created from the source data and submitted for interpretation  Radiation dose length product (DLP) for this visit:  750 mGy-cm     This examination, like all CT scans performed in the [facility], was performed utilizing techniques to minimize radiation dose exposure, including the use of iterative reconstruction and automated exposure control  Enteric contrast was administered  FINDINGS: ABDOMEN Evaluation of the solid organs is limited without IV contrast  LOWER CHEST:  No clinically significant abnormality identified in the visualized lower chest  LIVER/BILIARY TREE:  Liver is enlarged measuring 20 8 cm in length  No focal pathology detected  Difficult to evaluate fatty liver changes in the absence of internal reference (spleen)  Patient had documented fatty liver changes on previous MRI  GALLBLADDER:  Gallbladder is surgically absent  SPLEEN:  There has been prior splenectomy  No suspicious abnormality in the splenectomy space  Embolization coils are noted  PANCREAS:  Mild atrophy without acute findings  ADRENAL GLANDS:  Unremarkable  KIDNEYS/URETERS:  Unremarkable  No hydronephrosis  Mild nonspecific bilateral perinephric stranding  STOMACH AND BOWEL:  The stomach is suboptimally distended, evaluation limited  The small bowel is normal caliber  Diffuse colonic diverticulosis  There is equivocal fat streaking seen adjacent to the right sigmoid colon (series 2 images 60-62)  Part of this appearance may reflect streak artifact from high density oral contrast in the small bowel  APPENDIX:  A normal appendix is visualized  ABDOMINOPELVIC CAVITY:  No ascites or free intraperitoneal air  9 mm peripancreatic lymph node series 2/38  VESSELS:  Unremarkable for patient's age  PELVIS REPRODUCTIVE ORGANS:  The prostate is enlarged  URINARY BLADDER:  Unremarkable  ABDOMINAL WALL/INGUINAL REGIONS:  Fat-containing bilateral inguinal hernias  OSSEOUS STRUCTURES:  No acute fracture or destructive osseous lesion  Degenerative changes throughout the thoracal lumbar spine most notably disc disease at L3-4, L4-5 and L5-S1  Impression: Equivocal fat streaking adjacent to the right sigmoid colon  Although my suspicion is low, very mild acute diverticulitis cannot be entirely excluded   No evidence of abscess or acute intra-abdominal abnormality otherwise  Hepatomegaly  Limited study without IV contrast  Workstation performed: [id number]     X-ray Chest 1 View Portable    Result Date: 3/29/2018  Narrative: CHEST INDICATION:   Fever, cough  COMPARISON:  Chest radiographs May 18, 2018 EXAM PERFORMED/VIEWS:  XR CHEST PORTABLE Images: 2 FINDINGS: Heart shadow appears unremarkable  Atherosclerotic vascular calcifications are noted  Lung markings are crowded secondary to low lung volumes  Within limitations of this examination there is no focal airspace opacity to suggest pneumonia  No pneumothorax or pleural effusion  Vascular coils left upper quadrant, stable  Osseous structures appear within normal limits for patient age  Postoperative changes left shoulder  Impression: No active pulmonary disease on examination which is somewhat limited secondary to low lung volumes  Workstation performed: [id number]       Assessment and Plan  1  Significant neutropenia with acute febrile illness, ANC 0 25 in a patient who is immunocompromised for long autoimmune hemolytic anemia status post multiple rituximab, prednisone in the past as well as splenectomy, the bone marrow biopsy showed no evidence of lymphoma, leukemia or myelodysplasia  This neutropenia is a new, patient on cefepime and IV fluid for septic shock with significant clinical improvement  2  I will add filgrastim 480 mcg subcu x1 dose  3  If this is persistent neutropenia and leukopenia patient needs a bone marrow biopsy and aspirate to be repeated (the last 1 was done on December 2017  4  Check IgG level, if IgG below 400 I will start the patient on IVIG 50 g 1 dose only  5  Await culture results  6    He had recent treatment with rituximab/cyclophosphamide with prednisone 8 days ago

## 2018-03-29 NOTE — TELEPHONE ENCOUNTER
Pt called and indicated that he is on his way to the ED in [facility]  Experiencing fever, diarrhea and sweating

## 2018-03-29 NOTE — SEPSIS NOTE
Sepsis Note   eMaghan Villavicencio 60 y o  male MRN: [id number]  Unit/Bed#: ICU 06 Encounter: [id number]            Initial Sepsis Screening     [address] Name 03/29/18 1146                Is the patient's history suggestive of a new or worsening infection? (!)  Yes (Proceed)  -SA        Suspected source of infection suspect infection, source unknown  -SA        Are two or more of the following signs & symptoms of infection both present and new to the patient?         Indicate SIRS criteria Leukopenia (WBC < 4000 IJL); Hyperthemia > 38 3C (100 9F)  -SA        If the answer is yes to both questions, suspicion of sepsis is present          If severe sepsis is present AND tissue hypoperfusion perists in the hour after fluid resuscitation or lactate > 4, the patient meets criteria for SEPTIC SHOCK          Are any of the following organ dysfunction criteria present within 6 hours of suspected infection and SIRS criteria that are NOT considered to be chronic conditions? (!)  Yes  -SA        Organ dysfunction Lactate > 2 0 mmol/L  -SA        Date of presentation of severe sepsis 03/29/18  -SA        Time of presentation of severe sepsis 1033  -SA        Tissue hypoperfusion persists in the hour after crystalloid fluid administration, evidenced, by either:          Was hypotension present within one hour of the conclusion of crystalloid fluid administration?         Date of presentation of septic shock          Time of presentation of septic shock            User Key  (r) = Recorded By, (t) = Taken By, (c) = Cosigned By    [address] Name Provider Daniel Benitez MD Physician               Default Flowsheet Data (last 720 hours)      Sepsis Reassessment     Row Name 03/29/18 1242 03/29/18 1149                Volume Status and Tissue Perfusion Post Fluid Resuscitation- Must Document ALL of the Following:    Vital Signs Reviewed Yes  -TB Yes  -SA       Cardio Normal S1/S2; Regular rate and rhythm  -TB Normal S1/S2; Regular rate and rhythm  -SA       Pulmonary Normal effort;Clear to auscultation  -TB Normal effort  -SA       Capillary Refill Brisk  -TB Brisk  -SA       Peripheral Pulses Radial;Dorsalis Pedis  -TB Radial  -SA       Peripheral Pulse +2  -TB +3  -SA       Dorsalis Pedis +2  -TB         Skin Warm;Dry  -TB Warm  -SA          *OR*   Intensive Monitoring- Must Document Two * of the Following Four *:    Vital Signs Reviewed           * Central Venous Pressure (CVP or RAP)           * Central Venous Oxygen (SVO2, ScvO2 or Oxygen saturation via central catheter)           * Bedside Cardiovascular US in IVC diameter and % collapse           * Passive Leg Raise OR Crystalloid Challenge             User Key  (r) = Recorded By, (t) = Taken By, (c) = Cosigned By    Initials Name Provider Type    TB VISHNU Romero Nurse Practitioner    SA Torrey Rojas MD Physician

## 2018-03-29 NOTE — H&P
History & Physical Exam - Critical Care   Sugey Mejia 60 y o  male MRN: [id number]  Unit/Bed#: ED 23 Encounter: [id number]      Assessment/Plan:  1  Severe sepsis in immunocompromised patient, unknown source of infection  · Will admit patient to stepdown unit for closer monitoring, will require greater than 2 midnight hospitalization  · Due to immunocompromised state will start on broad spectrum antibiotics, received Cefepime in the ER will give 1 x dose of Vancomycin  · Blood cultures and urine culture pending  · Follow sepsis protocol, received 30 ml/kg and will continue to infuse at 125 ml/hr  Elevation in BNP do not suspect heart failure  · If blood pressure does not improve may need IV pressors and initiation of IV steroids  · Check a c-diff sample  2  Acute kidney injury likely due to dehydration   · Suspect this may be related to decreased appetite and dehydration  · Will provide will aggressive IVF hydration  · Continue to monitor intake and output  · Check renal indices tomorrow morning  3  Autoimmune hemolytic anemia  · Follows with Dr Shay Garza as an outpatient, will consult oncology  · Suspect this may be a reaction to chemotherapy with possible source of infection  · Will treat with ABX until cultures are resulted  · Continue on chronic 20 mg prednisone  4  Dyspnea  · This is new for the past 2 days, may be a reaction  No infiltrate or volume noted on chest x-ray  · On Xarelto for previous PE and with elevated creatinine- unable to perform CT with IV contrast  However, low suspicion for PE due to taking medication properly  5  History of PE/DVT  · Continue on Xarelto      _____________________________________________________________________      HPI:    Sugey Mejia is a 60 y o  male who presents to the emergency department with complaints of 2 day history of fever, dizziness, headache, shortness of breath, and diarrhea  He has a history of autoimmune hemolytic anemia, DVT/PE, and GERD    Was diagnosed with hemolytic anemia in November 2016 and most recently started on chemotherapy, Cytoxan and Rituxan, last Tuesday  Continues to receive frequent transfusions, the most recent was Friday 3/23/18  He follows with Dr Fabiana Tierney from oncology and was started on first round of chemotherapy last week  Two days ago he began with fevers and chills that were accompanied by a temporal headache, diaphoresis and shortness of breath  Today noted that he had 2 episodes of diarrhea, but denies any blood  Due to these symptoms he presented to the emergency department for evaluation  Was found to have a WBC count of 3, Dr Fabiana Tierney was notified  Patient appears to be hypotensive from baseline with a blood pressure in the 80's that improved to the low 100's with fluid resuscitation  He met sepsis criteria and started on IVF resuscitation and broad spectrum antibiotics  He denies any other complaints  Denies cough, chest pain, abdominal pain, nausea, or vomiting  Was diagnosed with a DVT/PE 1 year ago and has been compliant with Xarelto  Denies any recent travel  Review of Systems:  Review of Systems   Constitutional: Positive for chills, diaphoresis and fever (subjective)  HENT: Negative  Eyes: Negative  Respiratory: Positive for shortness of breath  Negative for cough  Cardiovascular: Negative  Negative for chest pain and leg swelling  Gastrointestinal: Positive for diarrhea (two times this morning)  Endocrine: Negative  Genitourinary: Negative  Negative for dysuria and urgency  Musculoskeletal: Negative  Allergic/Immunologic: Negative  Neurological: Positive for headaches (temporal headache)  Negative for weakness  Psychiatric/Behavioral: Negative  All other systems reviewed and are negative  Full 14 point review of systems was performed  Aside from what was mentioned in the HPI, it is otherwise negative        Historical Information   Past Medical History:   Diagnosis Date    Autoimmune hemolytic anemia (HCC)     Hemolytic anemia (HCC)     Palpitation     Tobacco abuse      Past Surgical History:   Procedure Laterality Date    KNEE SURGERY Right     NE LAP,CHOLECYSTECTOMY/GRAPH N/A 12/23/2017    Procedure: CHOLECYSTECTOMY LAPAROSCOPIC with cholangiogram;  Surgeon: Nicola Carbajal MD;  Location: [facility];  Service: General    NE REMOVAL SPLEEN, TOTAL N/A 5/18/2017    Procedure: LAPAROSCOPIC HAND ASSIST SPLENECTOMY;  Surgeon: Jaleel Bautista MD;  Location: [facility];  Service: Surgical Oncology    SHOULDER SURGERY Left      Social History   History   Alcohol Use    3 6 oz/week    6 Cans of beer per week     Comment: social     History   Drug Use No     History   Smoking Status    Light Tobacco Smoker    Types: Cigars   Smokeless Tobacco    Current User     Comment: socially       Family History:   History reviewed  No pertinent family history  Medications:  Pertinent medications were reviewed    Current Facility-Administered Medications:  sodium chloride 660 mL Intravenous Once Irene Ward MD Last Rate: 660 mL (03/29/18 1151)         Allergies   Allergen Reactions    Iodinated Diagnostic Agents Hives         Vitals:   /57   Pulse 88   Temp 97 7 °F (36 5 °C) (Oral)   Resp 22   Wt 88 6 kg (195 lb 5 2 oz)   SpO2 100%   BMI 29 71 kg/m²   Body mass index is 29 71 kg/m²    SpO2: 100 %,   SpO2 Activity: At Rest,   O2 Device: None (Room air)      Intake/Output Summary (Last 24 hours) at 03/29/18 1222  Last data filed at 03/29/18 1219   Gross per 24 hour   Intake             2050 ml   Output                0 ml   Net             2050 ml     Invasive Devices     Peripheral Intravenous Line            Peripheral IV 03/29/18 Left Antecubital less than 1 day                Physical Exam:  Gen: Alert and oriented, NAD  HEENT: PERRL, EOMI, sclera icteric, normocephalic, atraumatic, o/p clear  Neck/lymph: Supple, no JVD, no adenopathy  Chest: CTA  Cor: RRR, no murmurs, rubs, or gallops  Abd: Soft, non-tender, non-distended, bowel sounds present  Ext: CABALLERO, no edema  Neuro: CN II-XII grossly intact  Skin: Warm, dry, jaundice, no breakdown      Diagnostic Data:  Lab: I have personally reviewed pertinent lab results  ,   CBC:    Results from last 7 days  Lab Units 03/29/18  0947   WBC Thousand/uL 3 14*   HEMOGLOBIN g/dL 9 4*   HEMATOCRIT % 29 2*   PLATELETS Thousands/uL 312      CMP: Lab Results   Component Value Date     (L) 03/29/2018    K 3 7 03/29/2018    CL 97 (L) 03/29/2018    CO2 25 03/29/2018    ANIONGAP 13 03/29/2018    BUN 32 (H) 03/29/2018    CREATININE 2 31 (H) 03/29/2018    GLUCOSE 122 03/29/2018    CALCIUM 8 8 03/29/2018    AST 52 (H) 03/29/2018    ALT 83 (H) 03/29/2018    ALKPHOS 94 03/29/2018    PROT 6 6 03/29/2018    BILITOT 8 82 (H) 03/29/2018    EGFR 29 03/29/2018   ,   PT/INR:   Lab Results   Component Value Date    INR 1 59 (H) 03/29/2018   ,   Troponin:   Lab Results   Component Value Date    TROPONINI <0 02 03/29/2018   ,   Magnesium: No results found for: MAG,  Phosphorous: No results found for: PHOS    ABG: No results found for: PHART, LXT4YOY, PO2ART, AMH6MJW, S7VRPDUQ, BEART, SOURCE,     Microbiology:  Blood culture pending  Urinalysis pending    Imaging: I have personally reviewed the pertinent imaging studies on the PACS system  Chest x-ray: no active pulmonary disease  CT abdomen/pelvis: pending    EKG/telemetry/Echo:   NSR      VTE Prophylaxis: Sequential compression device (Venodyne) & Xarelto    Code Status: Prior    Portions of the record may have been created with voice recognition software  Occasional wrong word or "sound a like" substitutions may have occurred due to the inherent limitations of voice recognition software  Read the chart carefully and recognize, using context, where substitutions have occurred

## 2018-03-29 NOTE — ED PROVIDER NOTES
History  Chief Complaint   Patient presents with    Shortness of Breath     Reports sob, dizziness, fatigue, jaundice appearence that started two days ago  Currently having chemo  History provided by:  Patient   used: No    Shortness of Breath   Severity:  Moderate  Onset quality:  Gradual  Duration:  2 days  Timing:  Intermittent  Progression:  Waxing and waning  Chronicity:  New  Context comment:  Recent Chemo  Relieved by:  Nothing  Worsened by:  Nothing  Ineffective treatments:  None tried  Associated symptoms: abdominal pain and fever    Associated symptoms: no chest pain, no cough, no headaches, no neck pain, no rash, no sore throat, no syncope, no vomiting and no wheezing    Abdominal pain:     Location:  LLQ    Quality: cramping      Severity:  Moderate    Onset quality:  Gradual    Duration:  2 days    Timing:  Intermittent    Progression:  Waxing and waning    Chronicity:  New  Fever:     Duration:  2 days    Timing:  Intermittent    Max temp PTA:  101    Temp source:  Oral    Progression:  Waxing and waning  Risk factors: hx of PE/DVT (on Xeralto)    Risk factors comment:  Hemolytic anemia, on chemo      Prior to Admission Medications   Prescriptions Last Dose Informant Patient Reported? Taking?    Ferrous Sulfate (IRON) 325 (65 FE) MG TABS 3/29/2018 at Unknown time  Yes Yes   Sig: Take 325 mg by mouth daily     Multiple Vitamins-Minerals (ONE DAILY MENS) TABS 3/29/2018 at Unknown time  Yes Yes   Sig: Take by mouth   aspirin 81 MG tablet 3/29/2018 at Unknown time  Yes Yes   Sig: Take 81 mg by mouth daily   cyanocobalamin (VITAMIN B-12) 1,000 mcg tablet 3/29/2018 at Unknown time  Yes Yes   Sig: Take 1,000 mcg by mouth daily   ergocalciferol (VITAMIN D2) 50,000 units 3/29/2018 at Unknown time  Yes Yes   Sig: Take 50,000 Units by mouth once a week     influenza inactivated quadrivalent vaccine (FLULAVAL) 0 5 ML ANALILIA Unknown at Unknown time  No No   Sig: Inject 0 5 mL into the shoulder, thigh, or buttocks prior to discharge (preventative) for up to 1 dose   pantoprazole (PROTONIX) 40 mg tablet 3/29/2018 at Unknown time  No Yes   Sig: Take 1 tablet by mouth daily in the early morning   predniSONE 20 mg tablet 3/29/2018 at Unknown time  No Yes   Sig: Take 1 tablet by mouth daily   Patient taking differently: Take 10 mg by mouth every other day     rivaroxaban (XARELTO) 20 mg tablet 3/29/2018 at Unknown time  No Yes   Sig: Take 1 tablet by mouth daily with breakfast      Facility-Administered Medications: None       Past Medical History:   Diagnosis Date    Autoimmune hemolytic anemia (HCC)     DVT (deep venous thrombosis) (HCC)     GERD (gastroesophageal reflux disease)     Hemolytic anemia (HCC)     Palpitation     Portal vein thrombosis     Pulmonary emboli ([address] )     Tobacco abuse        Past Surgical History:   Procedure Laterality Date    KNEE SURGERY Right     ID LAP,CHOLECYSTECTOMY/GRAPH N/A 12/23/2017    Procedure: CHOLECYSTECTOMY LAPAROSCOPIC with cholangiogram;  Surgeon: Layton Bowen MD;  Location: [facility];  Service: General    ID REMOVAL SPLEEN, TOTAL N/A 5/18/2017    Procedure: LAPAROSCOPIC HAND ASSIST SPLENECTOMY;  Surgeon: Tiburcio Garcia MD;  Location:  MAIN OR;  Service: Surgical Oncology    SHOULDER SURGERY Left        Family History   Problem Relation Age of Onset    No Known Problems Mother     No Known Problems Father      I have reviewed and agree with the history as documented  Social History   Substance Use Topics    Smoking status: Light Tobacco Smoker     Types: Cigars    Smokeless tobacco: Current User      Comment: socially    Alcohol use 3 6 oz/week     6 Cans of beer per week      Comment: social        Review of Systems   Constitutional: Positive for fever  Negative for activity change and chills  HENT: Negative for facial swelling, sore throat and trouble swallowing  Eyes: Negative for pain and visual disturbance  Respiratory: Positive for shortness of breath  Negative for cough, chest tightness and wheezing  Cardiovascular: Negative for chest pain, leg swelling and syncope  Gastrointestinal: Positive for abdominal pain  Negative for blood in stool, diarrhea, nausea and vomiting  Genitourinary: Negative for dysuria and flank pain  Musculoskeletal: Negative for back pain, neck pain and neck stiffness  Skin: Negative for pallor and rash  Allergic/Immunologic: Negative for environmental allergies and immunocompromised state  Neurological: Negative for dizziness and headaches  Hematological: Negative for adenopathy  Does not bruise/bleed easily  Psychiatric/Behavioral: Negative for agitation and behavioral problems  All other systems reviewed and are negative  Physical Exam  ED Triage Vitals   Temperature Pulse Respirations Blood Pressure SpO2   03/29/18 0934 03/29/18 0934 03/29/18 0934 03/29/18 0934 03/29/18 0934   97 7 °F (36 5 °C) 101 20 (!) 85/52 99 %      Temp Source Heart Rate Source Patient Position - Orthostatic VS BP Location FiO2 (%)   03/29/18 0934 03/29/18 1002 03/29/18 1002 03/29/18 1002 --   Oral Monitor Lying Right arm       Pain Score       03/29/18 0934       4           Orthostatic Vital Signs  Vitals:    03/29/18 1115 03/29/18 1130 03/29/18 1315 03/29/18 1400   BP: 103/56 105/57 108/62 112/68   Pulse: 92 88 86 90   Patient Position - Orthostatic VS:           Physical Exam   Constitutional: He is oriented to person, place, and time  He appears well-developed and well-nourished  No distress  HENT:   Head: Normocephalic and atraumatic  Eyes: EOM are normal    Neck: Normal range of motion  Neck supple  Cardiovascular: Normal rate, regular rhythm, normal heart sounds and intact distal pulses  Pulmonary/Chest: Effort normal and breath sounds normal    Abdominal: Soft  Bowel sounds are normal  There is tenderness (mild LLQ)  There is no rebound and no guarding     Musculoskeletal: Normal range of motion  Neurological: He is alert and oriented to person, place, and time  Skin: Skin is warm and dry  Psychiatric: He has a normal mood and affect  Nursing note and vitals reviewed        ED Medications  Medications   aspirin chewable tablet 81 mg (81 mg Oral Not Given 3/29/18 1357)   cyanocobalamin (VITAMIN B-12) tablet 1,000 mcg (1,000 mcg Oral Not Given 3/29/18 1358)   ergocalciferol (VITAMIN D2) capsule 50,000 Units (50,000 Units Oral Not Given 3/29/18 1358)   ferrous sulfate tablet 325 mg (325 mg Oral Not Given 3/29/18 1359)   pantoprazole (PROTONIX) EC tablet 40 mg (40 mg Oral Not Given 3/29/18 1359)   predniSONE tablet 20 mg (20 mg Oral Not Given 3/29/18 1400)   rivaroxaban (XARELTO) tablet 20 mg (not administered)   sodium chloride 0 9 % infusion (125 mL/hr Intravenous New Bag 3/29/18 1411)   cefepime (MAXIPIME) IVPB (premix) 1,000 mg (not administered)   nicotine (NICODERM CQ) 14 mg/24hr TD 24 hr patch 14 mg (14 mg Transdermal Not Given 3/29/18 1420)   sodium chloride 0 9 % bolus 1,000 mL (0 mL Intravenous Stopped 3/29/18 1038)   sodium chloride 0 9 % bolus 1,000 mL (0 mL Intravenous Stopped 3/29/18 1151)   cefepime (MAXIPIME) IVPB (premix) 2,000 mg (0 mg Intravenous Stopped 3/29/18 1219)   sodium chloride 0 9 % bolus 660 mL (0 mL Intravenous Stopped 3/29/18 1340)   vancomycin (VANCOCIN) 1,250 mg in sodium chloride 0 9 % 250 mL IVPB (1,250 mg Intravenous New Bag 3/29/18 1338)   potassium chloride (K-DUR,KLOR-CON) CR tablet 40 mEq (40 mEq Oral Given 3/29/18 1419)   iohexol (OMNIPAQUE) 240 MG/ML solution 50 mL (50 mL Oral Given 3/29/18 1241)       Diagnostic Studies  Results Reviewed     Procedure Component Value Units Date/Time    Troponin I [[id number]]  (Normal) Collected:  03/29/18 1408    Lab Status:  Final result Specimen:  Blood from Arm, Left Updated:  03/29/18 1437     Troponin I <0 02 ng/mL     Narrative:         S[employer] Chemistry analyzer 99% cutoff is > 0 04 ng/mL in network labs    o cTnI 99% cutoff is useful only when applied to patients in the clinical setting of myocardial ischemia  o cTnI 99% cutoff should be interpreted in the context of clinical history, ECG findings and possibly cardiac imaging to establish correct diagnosis  o cTnI 99% cutoff may be suggestive but clearly not indicative of a coronary event without the clinical setting of myocardial ischemia  UA w Reflex to Microscopic w Reflex to Culture [[id number]]  (Normal) Collected:  03/29/18 1424    Lab Status:  Final result Specimen:  Urine from Urine, Clean Catch Updated:  03/29/18 1435     Color, UA Yellow     Clarity, UA Clear     Specific Gravity, UA <=1 005     pH, UA 6 0     Leukocytes, UA Negative     Nitrite, UA Negative     Protein, UA Negative mg/dl      Glucose, UA Negative mg/dl      Ketones, UA Negative mg/dl      Urobilinogen, UA 0 2 E U /dl      Bilirubin, UA Negative     Blood, UA Trace-Intact    Influenza A/B and RSV by PCR [[id number]] Collected:  03/29/18 1408    Lab Status:   In process Specimen:  Nasopharyngeal from Nasopharyngeal Swab Updated:  03/29/18 1415    Urine Microscopic [[id number]]  (Abnormal) Collected:  03/29/18 1300    Lab Status:  Final result Specimen:  Urine from Urine, Clean Catch Updated:  03/29/18 1331     RBC, UA 2-4 (A) /hpf      WBC, UA None Seen /hpf      Epithelial Cells None Seen /hpf      Bacteria, UA None Seen /hpf     ED Urine Macroscopic [[id number]]  (Abnormal) Collected:  03/29/18 1300    Lab Status:  Final result Specimen:  Urine Updated:  03/29/18 1301     Color, UA Xochitl     Clarity, UA Clear     pH, UA 5 5     Leukocytes, UA Negative     Nitrite, UA Negative     Protein, UA Trace (A) mg/dl      Glucose, UA Negative mg/dl      Ketones, UA Negative mg/dl      Urobilinogen, UA 0 2 E U /dl      Bilirubin, UA Negative     Blood, UA Negative     Specific Gravity, UA <=1 005    Narrative:       CLINITEK RESULT    Troponin I [[id number]]     Lab Status:  No result Specimen: Blood     Clostridium difficile toxin by PCR [[id number]]     Lab Status:  No result Specimen:  Stool     Lactic Acid x2 [[id number]]  (Normal) Collected:  03/29/18 1218    Lab Status:  Final result Specimen:  Blood from Arm, Left Updated:  03/29/18 1241     LACTIC ACID 1 7 mmol/L     Narrative:         Result may be elevated if tourniquet was used during collection  B-type natriuretic peptide [[id number]]  (Abnormal) Collected:  03/29/18 0947    Lab Status:  Final result Specimen:  Blood from Arm, Left Updated:  03/29/18 1038     NT-proBNP 3,873 (H) pg/mL     CBC and differential [[id number]]  (Abnormal) Collected:  03/29/18 0947    Lab Status:  Final result Specimen:  Blood from Arm, Left Updated:  03/29/18 1027     WBC 3 14 (L) Thousand/uL      RBC 2 33 (L) Million/uL      Hemoglobin 9 4 (L) g/dL      Hematocrit 29 2 (L) %       (H) fL      MCH 40 3 (H) pg      MCHC 32 2 g/dL      RDW 20 8 (H) %      MPV 10 2 fL      Platelets 829 Thousands/uL      nRBC 9 /100 WBCs     Comprehensive metabolic panel [[id number]]  (Abnormal) Collected:  03/29/18 0947    Lab Status:  Final result Specimen:  Blood from Arm, Left Updated:  03/29/18 1027     Sodium 135 (L) mmol/L      Potassium 3 7 mmol/L      Chloride 97 (L) mmol/L      CO2 25 mmol/L      Anion Gap 13 mmol/L      BUN 32 (H) mg/dL      Creatinine 2 31 (H) mg/dL      Glucose 122 mg/dL      Calcium 8 8 mg/dL      AST 52 (H) U/L      ALT 83 (H) U/L      Alkaline Phosphatase 94 U/L      Total Protein 6 6 g/dL      Albumin 3 7 g/dL      Total Bilirubin 8 82 (H) mg/dL      eGFR 29 ml/min/1 73sq m     Narrative:         National Kidney Disease Education Program recommendations are as follows:  GFR calculation is accurate only with a steady state creatinine  Chronic Kidney disease less than 60 ml/min/1 73 sq  meters  Kidney failure less than 15 ml/min/1 73 sq  meters      Lactic Acid x2 [[id number]]  (Abnormal) Collected:  03/29/18 0954    Lab Status:  Final result Specimen: Blood from Arm, Left Updated:  03/29/18 1026     LACTIC ACID 2 6 (HH) mmol/L     Narrative:         Result may be elevated if tourniquet was used during collection  Result may be elevated if tourniquet was used during collection  Troponin I [[id number]]  (Normal) Collected:  03/29/18 0954    Lab Status:  Final result Specimen:  Blood from Arm, Left Updated:  03/29/18 1022     Troponin I <0 02 ng/mL     Narrative:         [employer] Chemistry analyzer 99% cutoff is > 0 04 ng/mL in network labs    o cTnI 99% cutoff is useful only when applied to patients in the clinical setting of myocardial ischemia  o cTnI 99% cutoff should be interpreted in the context of clinical history, ECG findings and possibly cardiac imaging to establish correct diagnosis  o cTnI 99% cutoff may be suggestive but clearly not indicative of a coronary event without the clinical setting of myocardial ischemia  APTT [[id number]]  (Normal) Collected:  03/29/18 0947    Lab Status:  Final result Specimen:  Blood from Arm, Left Updated:  03/29/18 1017     PTT 29 seconds     Narrative: Therapeutic Heparin Range = 60-90 seconds    Protime-INR [[id number]]  (Abnormal) Collected:  03/29/18 0947    Lab Status:  Final result Specimen:  Blood from Arm, Left Updated:  03/29/18 1017     Protime 19 1 (H) seconds      INR 1 59 (H)    Blood culture #2 [[id number]] Collected:  03/29/18 0947    Lab Status: In process Specimen:  Blood from Arm, Left Updated:  03/29/18 1000    Blood culture #1 [[id number]] Collected:  03/29/18 0949    Lab Status: In process Specimen:  Blood from Arm, Left Updated:  03/29/18 0959                 CT abdomen pelvis wo contrast   Final Result by Nabil Watson DO (03/29 1305)      Equivocal fat streaking adjacent to the right sigmoid colon  Although my suspicion is low, very mild acute diverticulitis cannot be entirely excluded  No evidence of abscess or acute intra-abdominal abnormality otherwise  Hepatomegaly        Limited study without IV contrast             Workstation performed: [id number]         X-ray chest 1 view portable   Final Result by Sally Timmons DO (03/29 1116)      No active pulmonary disease on examination which is somewhat limited secondary to low lung volumes              Workstation performed: [id number]                    Procedures  ECG 12 Lead Documentation  Date/Time: 3/29/2018 12:02 PM  Performed by: Tiara Bowie  Authorized by: Tiaar Bowie     Indications / Diagnosis:  Fever/Sepsis  ECG reviewed by me, the ED Provider: yes    Patient location:  ED  Interpretation:     Interpretation: normal    Rate:     ECG rate:  100    ECG rate assessment: normal    Rhythm:     Rhythm: sinus rhythm    Ectopy:     Ectopy: none    QRS:     QRS axis:  Normal  Conduction:     Conduction: normal    ST segments:     ST segments:  Normal  T waves:     T waves: normal      CriticalCare Time  Performed by: Royce Moses by: Tiara Bowie     Critical care provider statement:     Critical care time (minutes):  30    Critical care time was exclusive of:  Separately billable procedures and treating other patients and teaching time    Critical care was necessary to treat or prevent imminent or life-threatening deterioration of the following conditions:  Sepsis    Critical care was time spent personally by me on the following activities:  Blood draw for specimens, obtaining history from patient or surrogate, development of treatment plan with patient or surrogate, discussions with consultants, evaluation of patient's response to treatment, examination of patient, interpretation of cardiac output measurements, ordering and performing treatments and interventions, ordering and review of laboratory studies, ordering and review of radiographic studies, re-evaluation of patient's condition and review of old charts    I assumed direction of critical care for this patient from another provider in my specialty: no Phone Contacts  ED Phone Contact    ED Course  ED Course as of Mar 29 1520   Thu Mar 29, 2018   1033 Sepsis alert called  LACTIC ACID: (!!) 2 6   1037 2nd litre NSS ordered  No source of Sepsis clear yet  We will do CT A/P with oral contrast to check for possible Diverticulitis as patient had diarrhea  Creatinine: (!) 2 31   1048 Case discussed with Dr Anna Ho, Oncologist, lab abnormlaities discussed, possible sepsis, unclear source yet; CT pending, we will cover with Cefepime one time dose; discussed with Pharmacist on duty in [employer], ok to give one time 2 gm dose  1111 Elevated BNP noted, possible new CHF, we will hold off on more IVF, 2 litres IVF given  BP improved 105/60  NT-proBNP: (!) 3,873                         Initial Sepsis Screening     Row Name 03/29/18 1146                Is the patient's history suggestive of a new or worsening infection? (!)  Yes (Proceed)  -SA        Suspected source of infection suspect infection, source unknown  -SA        Are two or more of the following signs & symptoms of infection both present and new to the patient?         Indicate SIRS criteria Leukopenia (WBC < 4000 IJL); Hyperthemia > 38 3C (100 9F)  -SA        If the answer is yes to both questions, suspicion of sepsis is present          If severe sepsis is present AND tissue hypoperfusion perists in the hour after fluid resuscitation or lactate > 4, the patient meets criteria for SEPTIC SHOCK          Are any of the following organ dysfunction criteria present within 6 hours of suspected infection and SIRS criteria that are NOT considered to be chronic conditions?  (!)  Yes  -SA        Organ dysfunction Lactate > 2 0 mmol/L  -SA        Date of presentation of severe sepsis 03/29/18  -SA        Time of presentation of severe sepsis 1033  -SA        Tissue hypoperfusion persists in the hour after crystalloid fluid administration, evidenced, by either:          Was hypotension present within one hour of the conclusion of crystalloid fluid administration?         Date of presentation of septic shock          Time of presentation of septic shock            User Key  (r) = Recorded By, (t) = Taken By, (c) = Cosigned By    [address] Name Provider Haritha Daley MD Physician           Default Flowsheet Data (last 720 hours)      Sepsis Reassessment     Row Name 03/29/18 1242 03/29/18 1149                Volume Status and Tissue Perfusion Post Fluid Resuscitation- Must Document ALL of the Following:    Vital Signs Reviewed Yes  -TB Yes  -SA       Cardio Normal S1/S2; Regular rate and rhythm  -TB Normal S1/S2; Regular rate and rhythm  -SA       Pulmonary Normal effort;Clear to auscultation  -TB Normal effort  -SA       Capillary Refill Brisk  -TB Brisk  -SA       Peripheral Pulses Radial;Dorsalis Pedis  -TB Radial  -SA       Peripheral Pulse +2  -TB +3  -SA       Dorsalis Pedis +2  -TB         Skin Warm;Dry  -TB Warm  -SA          *OR*   Intensive Monitoring- Must Document Two * of the Following Four *:    Vital Signs Reviewed           * Central Venous Pressure (CVP or RAP)           * Central Venous Oxygen (SVO2, ScvO2 or Oxygen saturation via central catheter)           * Bedside Cardiovascular US in IVC diameter and % collapse           * Passive Leg Raise OR Crystalloid Challenge             User Key  (r) = Recorded By, (t) = Taken By, (c) = Cosigned By    Initials Name Provider Type    TB Rey Cheney Nurse Practitioner    SA Nimco Godfrey MD Physician                MDM  Number of Diagnoses or Management Options  ARABELLA (acute kidney injury) [facility]): new and requires workup  Diarrhea: new and requires workup  Elevated brain natriuretic peptide (BNP) level: new and requires workup  Elevated lactic acid level: new and requires workup  Fever: new and requires workup  Sepsis [facility]): new and requires workup  Diagnosis management comments: Patient is a 25-year-old male, history of hemolytic anemia, jaundice, recent chemotherapy last Tuesday, 2 units packed RBC last Friday, comes in with complaints of fevers, T-max 101, generalized weakness, dyspnea, dizziness, diarrhea, symptoms started about 2 to the days back, gradually worsening  Patient also has past history of pulmonary embolism, on Xarelto  On exam patient is in no apparent distress, vital signs noted for hypertension, blood pressure 85/40, mild tachycardia, no hypoxia, lung exam shows diminished air entry at bases, the abdomen soft, mildly distended, mild left lower quadrant tenderness; extremities no calf swelling or tenderness  D/D:  Hemolytic anemia, recent chemo, Sepsis, Diverticulitis, CHF  We will check labs, ekg, trop, bnp, cxr, ct a/p         Amount and/or Complexity of Data Reviewed  Clinical lab tests: ordered and reviewed  Tests in the radiology section of CPT®: ordered and reviewed  Tests in the medicine section of CPT®: reviewed and ordered  Discuss the patient with other providers: yes  Independent visualization of images, tracings, or specimens: yes      CritCare Time    Disposition  Final diagnoses:   Fever   Sepsis ([place] [address] )   Elevated brain natriuretic peptide (BNP) level   Diarrhea   ARABELLA (acute kidney injury) ([place] [address] )   Elevated lactic acid level     Time reflects when diagnosis was documented in both MDM as applicable and the Disposition within this note     Time User Action Codes Description Comment    3/29/2018 12:04 PM Murriel Breach Add [R50 9] Fever     3/29/2018 12:04 PM Murriel Breach Add [A41 9] Sepsis ([place] [address] )     3/29/2018 12:04 PM Murriel Breach Add [R79 89] Elevated brain natriuretic peptide (BNP) level     3/29/2018 12:04 PM Murriel Breach Add [R19 7] Diarrhea     3/29/2018 12:16 PM Bilofsky, Neomia Dudley Add [D59 1] 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 ([place] [address] )     3/29/2018 12:16 PM Bilofsky, Neomia Dudley Modify [D59 1] 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 ([place] [address] )     3/29/2018  3:19 PM Murriel Breach Add [N17 9] ARABELLA (acute kidney injury) ([place] [address] ) 3/29/2018  3:19 PM Rigo Curiel Add [R79 89] Elevated lactic acid level       ED Disposition     ED Disposition Condition Comment    Admit  Case was discussed with Rapid Response NP Isa Donohue) and the patient's admission status was agreed to be Admission Status: inpatient status to the service of Dr Gregory Diop  Follow-up Information    None       Current Discharge Medication List      CONTINUE these medications which have NOT CHANGED    Details   aspirin 81 MG tablet Take 81 mg by mouth daily      cyanocobalamin (VITAMIN B-12) 1,000 mcg tablet Take 1,000 mcg by mouth daily      ergocalciferol (VITAMIN D2) 50,000 units Take 50,000 Units by mouth once a week        Ferrous Sulfate (IRON) 325 (65 FE) MG TABS Take 325 mg by mouth daily        Multiple Vitamins-Minerals (ONE DAILY MENS) TABS Take by mouth      pantoprazole (PROTONIX) 40 mg tablet Take 1 tablet by mouth daily in the early morning  Qty: 30 tablet, Refills: 0      predniSONE 20 mg tablet Take 1 tablet by mouth daily  Qty: 60 tablet, Refills: 0      rivaroxaban (XARELTO) 20 mg tablet Take 1 tablet by mouth daily with breakfast  Qty: 30 tablet, Refills: 0    Comments: Complete 21 days of 15mg 2x/day, then start 20mg daily      influenza inactivated quadrivalent vaccine (FLULAVAL) 0 5 ML ANALILIA Inject 0 5 mL into the shoulder, thigh, or buttocks prior to discharge (preventative) for up to 1 dose  Qty: 1 Syringe, Refills: 0           No discharge procedures on file      ED Provider  Electronically Signed by           Karen Porter MD  03/29/18 5116

## 2018-03-29 NOTE — SEPSIS NOTE
Sepsis Note   Rubén Farmer 60 y o  male MRN: [id number]  Unit/Bed#: ED 23 Encounter: [id number]            Initial Sepsis Screening     Row Name 03/29/18 1146                Is the patient's history suggestive of a new or worsening infection? (!)  Yes (Proceed)  -SA        Suspected source of infection suspect infection, source unknown  -SA        Are two or more of the following signs & symptoms of infection both present and new to the patient?         Indicate SIRS criteria Leukopenia (WBC < 4000 IJL); Hyperthemia > 38 3C (100 9F)  -SA        If the answer is yes to both questions, suspicion of sepsis is present          If severe sepsis is present AND tissue hypoperfusion perists in the hour after fluid resuscitation or lactate > 4, the patient meets criteria for SEPTIC SHOCK          Are any of the following organ dysfunction criteria present within 6 hours of suspected infection and SIRS criteria that are NOT considered to be chronic conditions? (!)  Yes  -SA        Organ dysfunction Lactate > 2 0 mmol/L  -SA        Date of presentation of severe sepsis 03/29/18  -SA        Time of presentation of severe sepsis 1033  -SA        Tissue hypoperfusion persists in the hour after crystalloid fluid administration, evidenced, by either:          Was hypotension present within one hour of the conclusion of crystalloid fluid administration?         Date of presentation of septic shock          Time of presentation of septic shock            User Key  (r) = Recorded By, (t) = Taken By, (c) = Cosigned By    [address] Name Provider Estrellita Umaña MD Physician               Default Flowsheet Data (last 720 hours)      Sepsis Reassessment     Row Name 03/29/18 1149                   Volume Status and Tissue Perfusion Post Fluid Resuscitation- Must Document ALL of the Following:    Vital Signs Reviewed Yes  -SA        Cardio Normal S1/S2; Regular rate and rhythm  -SA        Pulmonary Normal effort  -SA Capillary Refill Brisk  -SA        Peripheral Pulses Radial  -SA        Peripheral Pulse +3  -SA        Skin Warm  -SA           *OR*   Intensive Monitoring- Must Document Two * of the Following Four *:    Vital Signs Reviewed          * Central Venous Pressure (CVP or RAP)          * Central Venous Oxygen (SVO2, ScvO2 or Oxygen saturation via central catheter)          * Bedside Cardiovascular US in IVC diameter and % collapse          * Passive Leg Raise OR Crystalloid Challenge            User Key  (r) = Recorded By, (t) = Taken By, (c) = Cosigned By    Initials Name Provider Cynthia Man MD Physician

## 2018-03-30 ENCOUNTER — APPOINTMENT (INPATIENT)
Dept: NON INVASIVE DIAGNOSTICS | Facility: HOSPITAL | Age: 63
DRG: 872 | End: 2018-03-30
Payer: COMMERCIAL

## 2018-03-30 LAB
ABO GROUP BLD: NORMAL
ANION GAP SERPL CALCULATED.3IONS-SCNC: 9 MMOL/L (ref 4–13)
ANISOCYTOSIS BLD QL SMEAR: PRESENT
BASOPHILS # BLD MANUAL: 0.04 THOUSAND/UL (ref 0–0.1)
BASOPHILS NFR MAR MANUAL: 1 % (ref 0–1)
BILIRUB DIRECT SERPL-MCNC: 0.73 MG/DL (ref 0–0.2)
BLD GP AB SCN SERPL QL: POSITIVE
BUN SERPL-MCNC: 33 MG/DL (ref 5–25)
C DIFF TOX GENS STL QL NAA+PROBE: ABNORMAL
CALCIUM SERPL-MCNC: 8.8 MG/DL (ref 8.3–10.1)
CHLORIDE SERPL-SCNC: 110 MMOL/L (ref 100–108)
CO2 SERPL-SCNC: 24 MMOL/L (ref 21–32)
CREAT SERPL-MCNC: 1.17 MG/DL (ref 0.6–1.3)
DOHLE BOD BLD QL SMEAR: PRESENT
EOSINOPHIL # BLD MANUAL: 0.28 THOUSAND/UL (ref 0–0.4)
EOSINOPHIL NFR BLD MANUAL: 7 % (ref 0–6)
ERYTHROCYTE [DISTWIDTH] IN BLOOD BY AUTOMATED COUNT: 20.5 % (ref 11.6–15.1)
FLUAV AG SPEC QL: NORMAL
FLUBV AG SPEC QL: NORMAL
GFR SERPL CREATININE-BSD FRML MDRD: 66 ML/MIN/1.73SQ M
GLUCOSE SERPL-MCNC: 122 MG/DL (ref 65–140)
HCT VFR BLD AUTO: 22.6 % (ref 36.5–49.3)
HGB BLD-MCNC: 7.1 G/DL (ref 12–17)
HGB BLD-MCNC: 7.2 G/DL (ref 12–17)
HOWELL-JOLLY BOD BLD QL SMEAR: PRESENT
LDH SERPL-CCNC: 336 U/L (ref 81–234)
LG PLATELETS BLD QL SMEAR: PRESENT
LYMPHOCYTES # BLD AUTO: 1.46 THOUSAND/UL (ref 0.6–4.47)
LYMPHOCYTES # BLD AUTO: 37 % (ref 14–44)
MACROCYTES BLD QL AUTO: PRESENT
MCH RBC QN AUTO: 39 PG (ref 26.8–34.3)
MCHC RBC AUTO-ENTMCNC: 31.4 G/DL (ref 31.4–37.4)
MCV RBC AUTO: 124 FL (ref 82–98)
MONOCYTES # BLD AUTO: 1.22 THOUSAND/UL (ref 0–1.22)
MONOCYTES NFR BLD: 31 % (ref 4–12)
NEUTROPHILS # BLD MANUAL: 0.83 THOUSAND/UL (ref 1.85–7.62)
NEUTS BAND NFR BLD MANUAL: 16 % (ref 0–8)
NEUTS SEG NFR BLD AUTO: 5 % (ref 43–75)
NRBC BLD AUTO-RTO: 1 /100 WBC (ref 0–2)
NRBC BLD AUTO-RTO: 3 /100 WBCS
PLATELET # BLD AUTO: 253 THOUSANDS/UL (ref 149–390)
PLATELET BLD QL SMEAR: ADEQUATE
PMV BLD AUTO: 9.9 FL (ref 8.9–12.7)
POLYCHROMASIA BLD QL SMEAR: PRESENT
POTASSIUM SERPL-SCNC: 3.9 MMOL/L (ref 3.5–5.3)
RBC # BLD AUTO: 1.82 MILLION/UL (ref 3.88–5.62)
RH BLD: POSITIVE
RSV B RNA SPEC QL NAA+PROBE: NORMAL
SODIUM SERPL-SCNC: 143 MMOL/L (ref 136–145)
SPECIMEN EXPIRATION DATE: NORMAL
SPHEROCYTES BLD QL SMEAR: PRESENT
TOTAL CELLS COUNTED SPEC: 100
VARIANT LYMPHS # BLD AUTO: 3 %
WBC # BLD AUTO: 3.94 THOUSAND/UL (ref 4.31–10.16)

## 2018-03-30 PROCEDURE — 86922 COMPATIBILITY TEST ANTIGLOB: CPT

## 2018-03-30 PROCEDURE — 86850 RBC ANTIBODY SCREEN: CPT | Performed by: NURSE PRACTITIONER

## 2018-03-30 PROCEDURE — 86880 COOMBS TEST DIRECT: CPT

## 2018-03-30 PROCEDURE — 86901 BLOOD TYPING SEROLOGIC RH(D): CPT | Performed by: NURSE PRACTITIONER

## 2018-03-30 PROCEDURE — 86870 RBC ANTIBODY IDENTIFICATION: CPT | Performed by: NURSE PRACTITIONER

## 2018-03-30 PROCEDURE — 86921 COMPATIBILITY TEST INCUBATE: CPT

## 2018-03-30 PROCEDURE — 85007 BL SMEAR W/DIFF WBC COUNT: CPT | Performed by: NURSE PRACTITIONER

## 2018-03-30 PROCEDURE — 93308 TTE F-UP OR LMTD: CPT

## 2018-03-30 PROCEDURE — 93306 TTE W/DOPPLER COMPLETE: CPT | Performed by: INTERNAL MEDICINE

## 2018-03-30 PROCEDURE — 86860 RBC ANTIBODY ELUTION: CPT

## 2018-03-30 PROCEDURE — 86906 BLD TYPING SEROLOGIC RH PHNT: CPT

## 2018-03-30 PROCEDURE — 86945 BLOOD PRODUCT/IRRADIATION: CPT

## 2018-03-30 PROCEDURE — 99233 SBSQ HOSP IP/OBS HIGH 50: CPT | Performed by: INTERNAL MEDICINE

## 2018-03-30 PROCEDURE — 85018 HEMOGLOBIN: CPT | Performed by: NURSE PRACTITIONER

## 2018-03-30 PROCEDURE — 85027 COMPLETE CBC AUTOMATED: CPT | Performed by: NURSE PRACTITIONER

## 2018-03-30 PROCEDURE — 83615 LACTATE (LD) (LDH) ENZYME: CPT | Performed by: INTERNAL MEDICINE

## 2018-03-30 PROCEDURE — 86870 RBC ANTIBODY IDENTIFICATION: CPT

## 2018-03-30 PROCEDURE — 80048 BASIC METABOLIC PNL TOTAL CA: CPT | Performed by: NURSE PRACTITIONER

## 2018-03-30 PROCEDURE — 82248 BILIRUBIN DIRECT: CPT | Performed by: INTERNAL MEDICINE

## 2018-03-30 PROCEDURE — 86905 BLOOD TYPING RBC ANTIGENS: CPT

## 2018-03-30 PROCEDURE — 86900 BLOOD TYPING SEROLOGIC ABO: CPT

## 2018-03-30 PROCEDURE — 86901 BLOOD TYPING SEROLOGIC RH(D): CPT

## 2018-03-30 PROCEDURE — 86902 BLOOD TYPE ANTIGEN DONOR EA: CPT

## 2018-03-30 PROCEDURE — 86900 BLOOD TYPING SEROLOGIC ABO: CPT | Performed by: NURSE PRACTITIONER

## 2018-03-30 RX ADMIN — RIVAROXABAN 20 MG: 20 TABLET, FILM COATED ORAL at 08:26

## 2018-03-30 RX ADMIN — SODIUM CHLORIDE 125 ML/HR: 0.9 INJECTION, SOLUTION INTRAVENOUS at 02:28

## 2018-03-30 RX ADMIN — ASPIRIN 81 MG 81 MG: 81 TABLET ORAL at 08:26

## 2018-03-30 RX ADMIN — CEFEPIME HYDROCHLORIDE 1000 MG: 1 INJECTION, SOLUTION INTRAVENOUS at 12:38

## 2018-03-30 RX ADMIN — PREDNISONE 20 MG: 20 TABLET ORAL at 08:26

## 2018-03-30 RX ADMIN — METRONIDAZOLE 500 MG: 500 INJECTION, SOLUTION INTRAVENOUS at 21:26

## 2018-03-30 RX ADMIN — SODIUM CHLORIDE 125 ML/HR: 0.9 INJECTION, SOLUTION INTRAVENOUS at 09:56

## 2018-03-30 RX ADMIN — METRONIDAZOLE 500 MG: 500 INJECTION, SOLUTION INTRAVENOUS at 11:40

## 2018-03-30 RX ADMIN — PANTOPRAZOLE SODIUM 40 MG: 40 TABLET, DELAYED RELEASE ORAL at 05:07

## 2018-03-30 RX ADMIN — METRONIDAZOLE 500 MG: 500 INJECTION, SOLUTION INTRAVENOUS at 04:50

## 2018-03-30 RX ADMIN — CYANOCOBALAMIN TAB 500 MCG 1000 MCG: 500 TAB at 08:26

## 2018-03-30 RX ADMIN — FERROUS SULFATE TAB 325 MG (65 MG ELEMENTAL FE) 325 MG: 325 (65 FE) TAB at 08:26

## 2018-03-30 NOTE — PROGRESS NOTES
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  · Patient follows with Dr Nhi Muñoz as an outpatient  Oncology is following  IgG level was 457  · Continue neutropenic precautions  · Continue chronic prednisone 20 mg daily  · Hemoglobin down to 7 1  He normally receives routine blood transfusions as an outpatient  Will transfuse 1 unit PRBCs  3  Acute kidney injury, multifactorial secondary to sepsis with an element of dehydration  · Creatinine significantly improved  Continue IV fluids as above  Continue to trend renal indices and monitor intake and output  4  History of PE/DVT  · Continue Xarelto  5  Dyspnea  · Pt remains slightly subjectively dyspneic  Pulmonary exam unremarkable  If this does not improve, would consider lover extremity duplex despite pt being on chronic anticoagulation  · xopenex nebs prn  _____________________________________________________________________    HPI/24hr events:   Afebrile  Still with some subjective dyspnea  No acute events overnight      Medications:    Current Facility-Administered Medications:  aspirin 81 mg Oral Daily Bianka K Bilofsky, CRNP    cefepime 1,000 mg Intravenous Once Bianka K Bilofsky, CRNP    cyanocobalamin 1,000 mcg Oral Daily Clent Cuff Bilofsky, CRNP    ergocalciferol 50,000 Units Oral Weekly Bianka K Bilofsky, CRNP    ferrous sulfate 325 mg Oral Daily Clent Cuff Bilofsky, CRNP    levalbuterol 1 25 mg Nebulization Q8H PRN Ladan Garrett W Spatzer, CRNP    metroNIDAZOLE 500 mg Intravenous Q8H Cyntha Signs, CRNP Last Rate: 500 mg (03/30/18 0450)   nicotine 14 mg Transdermal Daily Bianka K Bilofsky, CRNP    pantoprazole 40 mg Oral Early Morning Bianka K Bilofsky, CRNP    predniSONE 20 mg Oral Daily Tamar Lee Bilroger, CRNP    rivaroxaban 20 mg Oral Daily With Breakfast Bianka K Bilofsky, CRNP    sodium chloride 125 mL/hr Intravenous Continuous Cyntha Signs, CRNP Last Rate: 125 mL/hr (03/30/18 0228)         sodium chloride 125 mL/hr Last Rate: 125 mL/hr (03/30/18 0228)         Physical exam:  Vitals: Body mass index is 30 kg/m²  Blood pressure 91/57, pulse 76, temperature (!) 97 1 °F (36 2 °C), temperature source Temporal, resp  rate (!) 10, height 5' 8" (1 727 m), weight 89 5 kg (197 lb 5 oz), SpO2 94 %  ,  Temp  Min: 96 3 °F (35 7 °C)  Max: 99 °F (37 2 °C)  IBW: 68 4 kg    SpO2: 94 %  SpO2 Activity: At Rest  O2 Device: None (Room air)      Intake/Output Summary (Last 24 hours) at 03/30/18 0527  Last data filed at 03/30/18 0400   Gross per 24 hour   Intake          4537 08 ml   Output             1350 ml   Net          3187 08 ml       Invasive/non-invasive ventilation settings:   Respiratory    Lab Data (Last 4 hours)    None         O2/Vent Data (Last 4 hours)    None              Invasive Devices     Peripheral Intravenous Line            Peripheral IV 03/29/18 Left Antecubital less than 1 day    Peripheral IV 03/29/18 Left;Distal Forearm less than 1 day                  Physical Exam:  Gen:  Sleeping but easily arousable, alert, appropriate, no acute distress  HEENT:  Atraumatic, normocephalic, extraocular movements intact, pupils 3 mm equal and reactive, oropharynx clear  Neck:  Supple, trachea midline, no JVD, no lymphadenopathy  Chest:  Clear to auscultation bilaterally, no wheeze, rales, rhonchi  Cor:  Single S1/S2, no murmurs, rubs, gallops, regular rate and rhythm  Abd:  Soft, nontender, nondistended, bowel sounds normoactive  Ext:  No edema, no clubbing or cyanosis  Neuro:  Oriented x3, cranial nerves 2-12 grossly intact, no focal deficits  Skin:  Warm, dry      Diagnostic Data:  Lab: I have personally reviewed pertinent lab results  CBC:     Results from last 7 days  Lab Units 03/30/18  0422 03/29/18  0947   WBC Thousand/uL 3 94* 3 14*   HEMOGLOBIN g/dL 7 1* 9 4*   HEMATOCRIT % 22 6* 29 2*   PLATELETS Thousands/uL 253 312       CMP:     Results from last 7 days  Lab Units 03/30/18  0422 03/29/18  0947   SODIUM mmol/L 143 135*   POTASSIUM mmol/L 3 9 3 7   CHLORIDE mmol/L 110* 97*   CO2 mmol/L 24 25   BUN mg/dL 33* 32*   CREATININE mg/dL 1 17 2 31*   CALCIUM mg/dL 8 8 8 8   TOTAL PROTEIN g/dL  --  6 6   BILIRUBIN TOTAL mg/dL  --  8 82*   ALK PHOS U/L  --  94   ALT U/L  --  83*   AST U/L  --  52*   GLUCOSE RANDOM mg/dL 122 122     PT/INR:   Lab Results   Component Value Date    INR 1 59 (H) 03/29/2018   ,   Magnesium:     Phosphorous:       Microbiology:  Blood cultures x2 - pending  Influenza culture - pending  C diff culture - pending    Imaging:  No new imaging    Cardiac lab/EKG/telemetry/ECHO:   Normal sinus rhythm on telemetry    VTE Prophylaxis:  Xarelto, SCDs    Code Status: Level 1 - Full Code    Marleta Miller Spatzer, CRNP    Portions of the record may have been created with voice recognition software  Occasional wrong word or "sound a like" substitutions may have occurred due to the inherent limitations of voice recognition software  Read the chart carefully and recognize, using context, where substitutions have occurred

## 2018-03-30 NOTE — CASE MANAGEMENT
Initial Clinical Review    Admission: Date/Time/Statement: 3/29/18 @ 1205     Orders Placed This Encounter   Procedures    Inpatient Admission (expected length of stay for this patient is greater than two midnights)     Standing Status:   Standing     Number of Occurrences:   1     Order Specific Question:   Admitting Physician     Answer:   Shadi Titus [[id number]]     Order Specific Question:   Level of Care     Answer:   Level 1 Stepdown [13]     Order Specific Question:   Estimated length of stay     Answer:   More than 2 Midnights     Order Specific Question:   Certification     Answer:   I certify that inpatient services are medically necessary for this patient for a duration of greater than two midnights  See H&P and MD Progress Notes for additional information about the patient's course of treatment  ED: Date/Time/Mode of Arrival:   ED Arrival Information     Expected Arrival Acuity Means of Arrival Escorted By Service Admission Type    - 3/29/2018 09:28 Emergent Walk-In Family Member Critical Care/ICU Emergency    Arrival Complaint    SOB,Fever          Chief Complaint:   Chief Complaint   Patient presents with    Shortness of Breath     Reports sob, dizziness, fatigue, jaundice appearence that started two days ago  Currently having chemo  History of Illness:     Kee Nicole is a 60 y o  male who presents to the emergency department with complaints of 2 day history of fever, dizziness, headache, shortness of breath, and diarrhea  He has a history of autoimmune hemolytic anemia, DVT/PE, and GERD  Was diagnosed with hemolytic anemia in November 2016 and most recently started on chemotherapy, Cytoxan and Rituxan, last Tuesday  Continues to receive frequent transfusions, the most recent was Friday 3/23/18  He follows with Dr Elba Araujo from oncology and was started on first round of chemotherapy last week    Two days ago he began with fevers and chills that were accompanied by a temporal headache, diaphoresis and shortness of breath  Today noted that he had 2 episodes of diarrhea, but denies any blood  Due to these symptoms he presented to the emergency department for evaluation  Was found to have a WBC count of 3, Dr Val Martin was notified  Patient appears to be hypotensive from baseline with a blood pressure in the 80's that improved to the low 100's with fluid resuscitation  He met sepsis criteria and started on IVF resuscitation and broad spectrum antibiotics      He denies any other complaints  Denies cough, chest pain, abdominal pain, nausea, or vomiting  Was diagnosed with a DVT/PE 1 year ago and has been compliant with Xarelto  Denies any recent travel  ED Vital Signs:   ED Triage Vitals   Temperature Pulse Respirations Blood Pressure SpO2   03/29/18 0934 03/29/18 0934 03/29/18 0934 03/29/18 0934 03/29/18 0934   97 7 °F (36 5 °C) 101 20 (!) 85/52 99 %      Temp Source Heart Rate Source Patient Position - Orthostatic VS BP Location FiO2 (%)   03/29/18 0934 03/29/18 1002 03/29/18 1002 03/29/18 1002 --   Oral Monitor Lying Right arm       Pain Score       03/29/18 0934       4        Wt Readings from Last 1 Encounters:   03/29/18 89 5 kg (197 lb 5 oz)       Vital Signs (abnormal): Above    Abnormal Labs/Diagnostic Test Results:    BNP   3,873  H/H    9 4/29 2  RBC   2 33  WBC    3 14  NA  135  BUN/Creat    32//2 31  AST  52  ALT     83  Total  Bili   8 82  Lactic  Acid    2 6  Ct  Abd/pelvis:    Equivocal fat streaking adjacent to the right sigmoid colon   Although my suspicion is low, very mild acute diverticulitis cannot be entirely excluded  No evidence of abscess or acute intra-abdominal abnormality otherwise  Hepatomegaly    CXR:   NAD    ED Treatment:   Medication Administration from 03/29/2018 0928 to 03/29/2018 1346       Date/Time Order Dose Route Action Action by Comments     03/29/2018 1038 sodium chloride 0 9 % bolus 1,000 mL 0 mL Intravenous Janeth Escobar RN 03/29/2018 0958 sodium chloride 0 9 % bolus 1,000 mL 1,000 mL Intravenous [address] Troy Roa RN      03/29/2018 1151 sodium chloride 0 9 % bolus 1,000 mL 0 mL Intravenous Stopped Troy Roa, RN      03/29/2018 1045 sodium chloride 0 9 % bolus 1,000 mL 1,000 mL Intravenous [address] Edgar Anderson, YASMIN      03/29/2018 1219 cefepime (MAXIPIME) IVPB (premix) 2,000 mg 0 mg Intravenous Stopped Troy Roa, RN      03/29/2018 1114 cefepime (MAXIPIME) IVPB (premix) 2,000 mg 2,000 mg Intravenous DEWEY Maki, RN      03/29/2018 1340 sodium chloride 0 9 % bolus 660 mL 0 mL Intravenous Stopped Elisabeth Salmeron, YASMIN      03/29/2018 1151 sodium chloride 0 9 % bolus 660 mL 660 mL Intravenous [address] Troy Roa RN      03/29/2018 1338 vancomycin (VANCOCIN) 1,250 mg in sodium chloride 0 9 % 250 mL IVPB 1,250 mg Intravenous [address] Elisabeth Salmeron, YASMIN      03/29/2018 1241 iohexol (OMNIPAQUE) 240 MG/ML solution 50 mL 50 mL Oral Given Gib Costain           Past Medical/Surgical History:    Active Ambulatory Problems     Diagnosis Date Noted    Tobacco abuse     Palpitation     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    Jaundice 11/02/2016    Hyperbilirubinemia 11/02/2016    Hemolytic anemia (HCC)     Symptomatic anemia 02/03/2017    Acute pulmonary embolism ([facility] [address] ) 06/06/2017    Portal vein thrombosis 06/08/2017    Elevated blood pressure reading without diagnosis of hypertension 06/09/2017    GERD (gastroesophageal reflux disease) 11/06/2017    Autoimmune hemolytic anemia (HCC)     Biliary colic 20/62/4354    Obstipation 12/21/2017    Calculus of gallbladder with acute on chronic cholecystitis without obstruction 12/21/2017     Resolved Ambulatory Problems     Diagnosis Date Noted    Shortness of breath 11/06/2017     Past Medical History:   Diagnosis Date    Autoimmune hemolytic anemia (HCC)     DVT (deep venous thrombosis) (HCC)     GERD (gastroesophageal reflux disease)     Hemolytic anemia (HCC)     Palpitation     Portal vein thrombosis     Pulmonary emboli (HCC)     Tobacco abuse        Admitting Diagnosis: Diarrhea [R19 7]  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 Will admit patient to stepdown unit for closer monitoring, will require greater than 2 midnight hospitalization  ? Due to immunocompromised state will start on broad spectrum antibiotics, received Cefepime in the ER will give 1 x dose of Vancomycin  ? Blood cultures and urine culture pending  ? Follow sepsis protocol, received 30 ml/kg and will continue to infuse at 125 ml/hr  Elevation in BNP do not suspect heart failure  ? If blood pressure does not improve may need IV pressors and initiation of IV steroids  ? Check a c-diff sample  2  Acute kidney injury likely due to dehydration   ? Suspect this may be related to decreased appetite and dehydration  ? Will provide will aggressive IVF hydration  ? Continue to monitor intake and output  ? Check renal indices tomorrow morning  3  Autoimmune hemolytic anemia  ? Follows with Dr Meryle Catalina as an outpatient, will consult oncology  ? Suspect this may be a reaction to chemotherapy with possible source of infection  ? Will treat with ABX until cultures are resulted  ? Continue on chronic 20 mg prednisone  4  Dyspnea  ? This is new for the past 2 days, may be a reaction  No infiltrate or volume noted on chest x-ray  ? On Xarelto for previous PE and with elevated creatinine- unable to perform CT with IV contrast  However, low suspicion for PE due to taking medication properly  5   History of PE/DVT  Continue on Xarelto    Admission Orders:   IP 3/29  @     1205  Scheduled Meds:   Current Facility-Administered Medications:  aspirin 81 mg Oral Daily Bianka K VISHNU Mccullough    cefepime 1,000 mg Intravenous Once VISHNU Bajwa    cyanocobalamin 1,000 mcg Oral Daily Gregorio Simper Bilofadrian, VISHNU    ergocalciferol 50,000 Units Oral Weekly Bianka K VISHNU Mccullough    ferrous sulfate 325 mg Oral Daily Gregorio Simper Surajofadrian, VISHNU    levalbuterol 1 25 mg Nebulization Q8H PRN Alberta Presume Spatzer, VISHNU    metroNIDAZOLE 500 mg Intravenous Q8H VISHNU Davis Last Rate: Stopped (03/30/18 0602)   nicotine 14 mg Transdermal Daily Bianka K VISHNU Mccullough    pantoprazole 40 mg Oral Early Morning Bianka K VISHNU Mccullough    predniSONE 20 mg Oral Daily Bianka K Surajofadrian, VISHNU    rivaroxaban 20 mg Oral Daily With Breakfast Bianka K VISHNU Mccullough    sodium chloride 125 mL/hr Intravenous Continuous Gregorio VISHNU Kc Last Rate: 125 mL/hr (03/30/18 0956)     Continuous Infusions:   sodium chloride 125 mL/hr Last Rate: 125 mL/hr (03/30/18 0956)     PRN Meds: levalbuterol     Neuro  Checks  Q 4 hrs  Serial troponin  Cons  Oncology  Stool c/diff  2 DE    Per  Oncology  Consult:  Significant neutropenia with acute febrile illness, ANC 0 25 in a patient who is immunocompromised for long autoimmune hemolytic anemia status post multiple rituximab, prednisone in the past as well as splenectomy, the bone marrow biopsy showed no evidence of lymphoma, leukemia or myelodysplasia  This neutropenia is a new, patient on cefepime and IV fluid for septic shock with significant clinical improvement  2  I will add filgrastim 480 mcg subcu x1 dose  3  If this is persistent neutropenia and leukopenia patient needs a bone marrow biopsy and aspirate to be repeated (the last 1 was done on December 2017  4  Check IgG level, if IgG below 400 I will start the patient on IVIG 50 g 1 dose only  5  Await culture results  6    He had recent treatment with rituximab/cyclophosphamide with prednisone 8 days ago    Thank you,  [id number] [facility] in the [facility] by Juan Schaefer for 2017  Network Utilization Review Department  Phone: [phone]; Fax [phone]  ATTENTION: The Network Utilization Review Department is now centralized for our 7 Facilities  Make a note that we have a new phone and fax numbers for our Department  Please call with any questions or concerns to [phone] and carefully follow the prompts so that you are directed to the right person  All voicemails are confidential  Fax any determinations, approvals, denials, and requests for initial or continue stay review clinical to [phone]  Due to HIGH CALL volume, it would be easier if you could please send faxed requests to expedite your requests and in part, help us provide discharge notifications faster

## 2018-03-31 LAB — BLOOD GROUP ANTIBODIES SERPL: NORMAL

## 2018-03-31 PROCEDURE — 99232 SBSQ HOSP IP/OBS MODERATE 35: CPT | Performed by: INTERNAL MEDICINE

## 2018-03-31 PROCEDURE — P9016 RBC LEUKOCYTES REDUCED: HCPCS

## 2018-03-31 PROCEDURE — 99254 IP/OBS CNSLTJ NEW/EST MOD 60: CPT | Performed by: INTERNAL MEDICINE

## 2018-03-31 PROCEDURE — P9040 RBC LEUKOREDUCED IRRADIATED: HCPCS

## 2018-03-31 RX ORDER — ACETAMINOPHEN 325 MG/1
650 TABLET ORAL EVERY 6 HOURS PRN
Status: DISCONTINUED | OUTPATIENT
Start: 2018-03-31 | End: 2018-04-01 | Stop reason: HOSPADM

## 2018-03-31 RX ORDER — ACETAMINOPHEN 325 MG/1
650 TABLET ORAL ONCE
Status: COMPLETED | OUTPATIENT
Start: 2018-03-31 | End: 2018-03-31

## 2018-03-31 RX ADMIN — PREDNISONE 20 MG: 20 TABLET ORAL at 08:08

## 2018-03-31 RX ADMIN — CYANOCOBALAMIN TAB 500 MCG 1000 MCG: 500 TAB at 08:09

## 2018-03-31 RX ADMIN — METRONIDAZOLE 500 MG: 500 INJECTION, SOLUTION INTRAVENOUS at 13:02

## 2018-03-31 RX ADMIN — METRONIDAZOLE 500 MG: 500 INJECTION, SOLUTION INTRAVENOUS at 20:13

## 2018-03-31 RX ADMIN — FERROUS SULFATE TAB 325 MG (65 MG ELEMENTAL FE) 325 MG: 325 (65 FE) TAB at 08:09

## 2018-03-31 RX ADMIN — PANTOPRAZOLE SODIUM 40 MG: 40 TABLET, DELAYED RELEASE ORAL at 05:00

## 2018-03-31 RX ADMIN — ASPIRIN 81 MG 81 MG: 81 TABLET ORAL at 08:09

## 2018-03-31 RX ADMIN — ACETAMINOPHEN 650 MG: 325 TABLET, FILM COATED ORAL at 18:46

## 2018-03-31 RX ADMIN — RIVAROXABAN 20 MG: 20 TABLET, FILM COATED ORAL at 08:09

## 2018-03-31 RX ADMIN — SODIUM CHLORIDE 75 ML/HR: 0.9 INJECTION, SOLUTION INTRAVENOUS at 14:28

## 2018-03-31 RX ADMIN — SODIUM CHLORIDE 125 ML/HR: 0.9 INJECTION, SOLUTION INTRAVENOUS at 03:19

## 2018-03-31 RX ADMIN — ACETAMINOPHEN 650 MG: 325 TABLET, FILM COATED ORAL at 05:31

## 2018-03-31 RX ADMIN — VANCOMYCIN 125 MG: KIT at 23:13

## 2018-03-31 RX ADMIN — METRONIDAZOLE 500 MG: 500 INJECTION, SOLUTION INTRAVENOUS at 04:58

## 2018-03-31 RX ADMIN — VANCOMYCIN 125 MG: KIT at 17:24

## 2018-03-31 RX ADMIN — VANCOMYCIN 125 MG: KIT at 13:16

## 2018-03-31 NOTE — NURSING NOTE
Spoke with blood bank at this time  Pt received 1unit PRBC  Blood bank is still waiting for the 2nd unit at this time  The blood bank will call to the floor when this unit is received

## 2018-03-31 NOTE — NURSING NOTE
Follow up with blood bank at this time at this time  The blood bank will notify nurse when the unit is available  The blood product was received from outside blood bank

## 2018-03-31 NOTE — PROGRESS NOTES
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 with recurrent transfusions and still has pending blood transfusions here pending antibiotic clearance today  During treatment ICU acute kidney injury on chronic kidney disease stage 2 improving and on transfer out of ICU find that the patient has C diff PCR positive  He has a history of pulmonary emboli in the on Xarelto 10 days ago was started on rituximab and Cytoxan and prednisone for 5 days    He then developed his febrile illness fever chills and headache and presented with septic shock    Principal Problem:    Severe sepsis ([facility] )  Active Problems:    Jaundice    Hyperbilirubinemia    GERD (gastroesophageal reflux disease)    Autoimmune hemolytic anemia (HCC)    Acute kidney injury ([facility] )      Plan:    · Severe sepsis in resolution has been on combination of cefepime and metronidazole however now noting C diff PCR positive and would add oral Flagyl or vancomycin and will ask Infectious Disease input on whether to discontinue cefepime and IV Flagyl in this setting  · Hyperbilirubinemia presumed in relation to hemolysis/as as direct component only 0 73  · Autoimmune hemolytic anemia will continue on prednisone 20 mg daily per hemo Onc if needs higher dosages would need prophylaxis with Bactrim 3 times weekly for PCP prophylaxis/will transfuse 2 units packed red blood cells today  · Pulmonary embolus continue Xarelto  · Acute kidney injury in resolution presume in relation to sepsis      VTE Pharmacologic Prophylaxis:   Pharmacologic: Rivaroxaban (Xarelto)  Mechanical VTE Prophylaxis in Place: No    Discussions with Specialists or Other Care Team Provider: *no    Time Spent for Care: 45 minutes  More than 50% of total time spent on counseling and coordination of care as described above  Subjective:   Awake alert feeling better stronger but still feels that his weakness that remains is in relation to need for blood transfusions which have been ordered and are pending antibody clearance from blood bank  This was explained to him he states his stools are starting to [address] [facility],[address] up he has no cramping abdominal pain nor emesis nor nausea tolerating a diet  Objective:     Vitals:   Temp (24hrs), Av 8 °F (36 6 °C), Min:97 4 °F (36 3 °C), Max:98 4 °F (36 9 °C)    HR:  [83-90] 83  Resp:  [14-25] 18  BP: (124-155)/(60-85) 139/85  SpO2:  [94 %-97 %] 97 %  Body mass index is 30 kg/m²  Input and Output Summary (last 24 hours):        Intake/Output Summary (Last 24 hours) at 18 1101  Last data filed at 18 7633   Gross per 24 hour   Intake             1950 ml   Output              575 ml   Net             1375 ml       Physical Exam:     Physical Exam:   General appearance: alert, appears stated age and cooperative  Head: Normocephalic, without obvious abnormality, atraumatic  Lungs: clear to auscultation bilaterally  Heart: regular rate and rhythm  Abdomen: soft, non-tender; bowel sounds normal; no masses,  no organomegaly  Back: negative  Extremities: extremities normal, atraumatic, no cyanosis or edema  Neurologic: Grossly normal      Additional Data:     Labs:      Results from last 7 days  Lab Units 18  0957 18  0422   WBC Thousand/uL  --  3 94*   HEMOGLOBIN g/dL 7 2* 7 1*   HEMATOCRIT %  --  22 6*   PLATELETS Thousands/uL  --  253   LYMPHO PCT %  --  37   MONO PCT MAN %  --  31*   EOSINO PCT MANUAL %  --  7*       Results from last 7 days  Lab Units 18  0422 18  0947   SODIUM mmol/L 143 135*   POTASSIUM mmol/L 3 9 3  7   CHLORIDE mmol/L 110* 97*   CO2 mmol/L 24 25   BUN mg/dL 33* 32*   CREATININE mg/dL 1 17 2 31*   CALCIUM mg/dL 8 8 8 8   TOTAL PROTEIN g/dL  --  6 6   BILIRUBIN TOTAL mg/dL  --  8 82*   ALK PHOS U/L  --  94   ALT U/L  --  83*   AST U/L  --  52*   GLUCOSE RANDOM mg/dL 122 122       Results from last 7 days  Lab Units 03/29/18  0947   INR  1 59*       * I Have Reviewed All Lab Data Listed Above  * Additional Pertinent Lab Tests Reviewed: All Labs For Current Hospital Admission Reviewed    Imaging:  Ct Abdomen Pelvis Wo Contrast    Result Date: 3/29/2018  Narrative: CT ABDOMEN AND PELVIS WITHOUT IV CONTRAST INDICATION:   Abdominal pain, Diarrhea, Acute renal insufficiency  COMPARISON: CT abdomen pelvis 12/21/2017, MRI abdomen 12/22/2017 TECHNIQUE:  CT examination of the abdomen and pelvis was performed without intravenous contrast   Axial, sagittal, and coronal 2D reformatted images were created from the source data and submitted for interpretation  Radiation dose length product (DLP) for this visit:  750 mGy-cm   This examination, like all CT scans performed in the [facility], was performed utilizing techniques to minimize radiation dose exposure, including the use of iterative reconstruction and automated exposure control  Enteric contrast was administered  FINDINGS: ABDOMEN Evaluation of the solid organs is limited without IV contrast  LOWER CHEST:  No clinically significant abnormality identified in the visualized lower chest  LIVER/BILIARY TREE:  Liver is enlarged measuring 20 8 cm in length  No focal pathology detected  Difficult to evaluate fatty liver changes in the absence of internal reference (spleen)  Patient had documented fatty liver changes on previous MRI  GALLBLADDER:  Gallbladder is surgically absent  SPLEEN:  There has been prior splenectomy  No suspicious abnormality in the splenectomy space  Embolization coils are noted  PANCREAS:  Mild atrophy without acute findings  ADRENAL GLANDS:  Unremarkable  KIDNEYS/URETERS:  Unremarkable  No hydronephrosis  Mild nonspecific bilateral perinephric stranding  STOMACH AND BOWEL:  The stomach is suboptimally distended, evaluation limited  The small bowel is normal caliber  Diffuse colonic diverticulosis  There is equivocal fat streaking seen adjacent to the right sigmoid colon (series 2 images 60-62)  Part of this appearance may reflect streak artifact from high density oral contrast in the small bowel  APPENDIX:  A normal appendix is visualized  ABDOMINOPELVIC CAVITY:  No ascites or free intraperitoneal air  9 mm peripancreatic lymph node series 2/38  VESSELS:  Unremarkable for patient's age  PELVIS REPRODUCTIVE ORGANS:  The prostate is enlarged  URINARY BLADDER:  Unremarkable  ABDOMINAL WALL/INGUINAL REGIONS:  Fat-containing bilateral inguinal hernias  OSSEOUS STRUCTURES:  No acute fracture or destructive osseous lesion  Degenerative changes throughout the thoracal lumbar spine most notably disc disease at L3-4, L4-5 and L5-S1  Impression: Equivocal fat streaking adjacent to the right sigmoid colon  Although my suspicion is low, very mild acute diverticulitis cannot be entirely excluded  No evidence of abscess or acute intra-abdominal abnormality otherwise  Hepatomegaly  Limited study without IV contrast  Workstation performed: [id number]     X-ray Chest 1 View Portable    Result Date: 3/29/2018  Narrative: CHEST INDICATION:   Fever, cough  COMPARISON:  Chest radiographs May 18, 2018 EXAM PERFORMED/VIEWS:  XR CHEST PORTABLE Images: 2 FINDINGS: Heart shadow appears unremarkable  Atherosclerotic vascular calcifications are noted  Lung markings are crowded secondary to low lung volumes  Within limitations of this examination there is no focal airspace opacity to suggest pneumonia  No pneumothorax or pleural effusion  Vascular coils left upper quadrant, stable  Osseous structures appear within normal limits for patient age  Postoperative changes left shoulder  Impression: No active pulmonary disease on examination which is somewhat limited secondary to low lung volumes  Workstation performed: [id number]     Imaging Reports Reviewed Today Include:  Review CT abdomen and pelvis  Imaging Personally Reviewed by Myself Includes:    Procedure: Ct Abdomen Pelvis Wo Contrast    Result Date: 3/29/2018  Narrative: CT ABDOMEN AND PELVIS WITHOUT IV CONTRAST INDICATION:   Abdominal pain, Diarrhea, Acute renal insufficiency  COMPARISON: CT abdomen pelvis 12/21/2017, MRI abdomen 12/22/2017 TECHNIQUE:  CT examination of the abdomen and pelvis was performed without intravenous contrast   Axial, sagittal, and coronal 2D reformatted images were created from the source data and submitted for interpretation  Radiation dose length product (DLP) for this visit:  750 mGy-cm   This examination, like all CT scans performed in the [facility], was performed utilizing techniques to minimize radiation dose exposure, including the use of iterative reconstruction and automated exposure control  Enteric contrast was administered  FINDINGS: ABDOMEN Evaluation of the solid organs is limited without IV contrast  LOWER CHEST:  No clinically significant abnormality identified in the visualized lower chest  LIVER/BILIARY TREE:  Liver is enlarged measuring 20 8 cm in length  No focal pathology detected  Difficult to evaluate fatty liver changes in the absence of internal reference (spleen)  Patient had documented fatty liver changes on previous MRI  GALLBLADDER:  Gallbladder is surgically absent  SPLEEN:  There has been prior splenectomy  No suspicious abnormality in the splenectomy space  Embolization coils are noted  PANCREAS:  Mild atrophy without acute findings  ADRENAL GLANDS:  Unremarkable  KIDNEYS/URETERS:  Unremarkable  No hydronephrosis  Mild nonspecific bilateral perinephric stranding   STOMACH AND BOWEL:  The stomach is suboptimally distended, evaluation limited  The small bowel is normal caliber  Diffuse colonic diverticulosis  There is equivocal fat streaking seen adjacent to the right sigmoid colon (series 2 images 60-62)  Part of this appearance may reflect streak artifact from high density oral contrast in the small bowel  APPENDIX:  A normal appendix is visualized  ABDOMINOPELVIC CAVITY:  No ascites or free intraperitoneal air  9 mm peripancreatic lymph node series 2/38  VESSELS:  Unremarkable for patient's age  PELVIS REPRODUCTIVE ORGANS:  The prostate is enlarged  URINARY BLADDER:  Unremarkable  ABDOMINAL WALL/INGUINAL REGIONS:  Fat-containing bilateral inguinal hernias  OSSEOUS STRUCTURES:  No acute fracture or destructive osseous lesion  Degenerative changes throughout the thoracal lumbar spine most notably disc disease at L3-4, L4-5 and L5-S1  Impression: Equivocal fat streaking adjacent to the right sigmoid colon  Although my suspicion is low, very mild acute diverticulitis cannot be entirely excluded  No evidence of abscess or acute intra-abdominal abnormality otherwise  Hepatomegaly  Limited study without IV contrast  Workstation performed: [id number]     Procedure: X-ray Chest 1 View Portable    Result Date: 3/29/2018  Narrative: CHEST INDICATION:   Fever, cough  COMPARISON:  Chest radiographs May 18, 2018 EXAM PERFORMED/VIEWS:  XR CHEST PORTABLE Images: 2 FINDINGS: Heart shadow appears unremarkable  Atherosclerotic vascular calcifications are noted  Lung markings are crowded secondary to low lung volumes  Within limitations of this examination there is no focal airspace opacity to suggest pneumonia  No pneumothorax or pleural effusion  Vascular coils left upper quadrant, stable  Osseous structures appear within normal limits for patient age  Postoperative changes left shoulder  Impression: No active pulmonary disease on examination which is somewhat limited secondary to low lung volumes   Workstation performed: [id number]        Recent Cultures (last 7 days):       Results from last 7 days  Lab Units 03/29/18  1550 03/29/18  1408 03/29/18  0949 03/29/18  0947   BLOOD CULTURE   --   --  No Growth at 24 hrs  No Growth at 24 hrs  INFLUENZA A PCR   --  None Detected  --   --    INFLUENZA B PCR   --  None Detected  --   --    RSV PCR   --  None Detected  --   --    C DIFF TOXIN B  POSITIVE for C difficle toxin by PCR  *  --   --   --        Last 24 Hours Medication List:     Current Facility-Administered Medications:  aspirin 81 mg Oral Daily Bianka K Bilofsky, CRNP    cyanocobalamin 1,000 mcg Oral Daily Pattie Flood Bilofsky, CRNP    ergocalciferol 50,000 Units Oral Weekly Bianka K Bilofsky, CRNP    ferrous sulfate 325 mg Oral Daily Pattie Flood Bilofsky, CRNP    levalbuterol 1 25 mg Nebulization Q8H PRN Jeris Kiang Spatzer, CRNP    metroNIDAZOLE 500 mg Intravenous Q8H Collie Butts, CRNP Last Rate: Stopped (03/31/18 0528)   nicotine 14 mg Transdermal Daily Bianka K Bilofsky, CRNP    pantoprazole 40 mg Oral Early Morning Bianka K Bilofsky, CRNP    predniSONE 20 mg Oral Daily Bianka K Bilofsky, CRNP    rivaroxaban 20 mg Oral Daily With Breakfast Bianka K Bilofsky, CRNP    sodium chloride 125 mL/hr Intravenous Continuous Collie Butts, CRNP Last Rate: 125 mL/hr (03/31/18 0319)        Today, Patient Was Seen By: Ned Peters MD    ** Please Note: Dragon 360 Dictation voice to text software may have been used in the creation of this document   **

## 2018-03-31 NOTE — PROGRESS NOTES
Progress Note - Lin Santos 60 y o  male MRN: [id number]    Unit/Bed#: [place] [address] -01 Encounter: [id number]      Assessment:  In summary, this is a 59-year-old male history of autoimmune hemolytic anemia, refractory  He is currently on prednisone 20 mg p o  daily  He has been treated with a variety of different agents with modest temporary benefit  Most recently was treated with Cytoxan, Rituxan, high-dose prednisone  He was admitted to the hospital with sepsis  White count 3 1  Is not entirely clear that he was neutropenic at the time of his admission and the extent to which chemotherapy contributed to his infectious process  The temporal relationship is certainly provocative in this regard, however  He is on antibiotics and is improving  Transfusion support is ongoing  If he continues to improve clinically I think it is reasonable for him to be discharged in the next 24-48 hours  Continued hematologic monitoring at [facility] is planned  We reviewed that a more optimal treatment for his hemolytic anemia is to be evaluated  Plan:  See above  Subjective:   Patient is feeling better than when he entered the hospital   Appetite is improving  He has less dyspnea  He has no nausea or vomiting  He has no diarrhea  Lightheadedness has resolved  He has no bleeding symptoms  Objective:  Yesterday hemoglobin 7 2, , BMP normal, creatinine has normalized  WBC 3 9, platelets 791  IgG 457  Vitals: Blood pressure 145/82, pulse 79, temperature 98 5 °F (36 9 °C), temperature source Temporal, resp  rate 16, height 5' 8" (1 727 m), weight 89 5 kg (197 lb 5 oz), SpO2 97 %  ,Body mass index is 30 kg/m²        Intake/Output Summary (Last 24 hours) at 03/31/18 1153  Last data filed at 03/31/18 0528   Gross per 24 hour   Intake             1950 ml   Output              575 ml   Net             1375 ml       Physical Exam:   General Appearance:    Alert, oriented        Eyes:    PERRL Ears:    Normal external ear canals, both ears   Nose:   Nares normal, septum midline   Throat:   Mucosa moist  Pharynx without injection  Neck:   Supple       Lungs:     Clear to auscultation bilaterally   Chest Wall:    No tenderness or deformity    Heart:    Regular rate and rhythm       Abdomen:     Soft, non-tender, bowel sounds +, no organomegaly           Extremities:   Extremities no cyanosis or edema       Skin:   no rash or icterus  Lymph nodes:   Cervical, supraclavicular, and axillary nodes normal   Neurologic:   CNII-XII intact, normal strength, sensation and reflexes     throughout           Invasive Devices     Peripheral Intravenous Line            Peripheral IV 03/29/18 Left Antecubital 2 days    Peripheral IV 03/29/18 Left;Distal Forearm 2 days                Lab, Imaging and other studies: I have personally reviewed pertinent reports

## 2018-03-31 NOTE — CONSULTS
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, just started 1st course of chemotherapy with Cytoxan and Rituxan last week, admitted yesterday with septic shock, neutropenic fever and C difficile colitis  Patient is clinically much improved with broad-spectrum antibiotic including IV Flagyl  Hypotension has resolved  1   Septic shock, POA  Source of infection is most likely C difficile colitis  Patient's stool C difficile toxin is positive, he has LLQ abdominal pain and there is evidence of sigmoid colitis on CT  Although patient is improved on IV Flagyl, given his neutropenia, severity of illness, and immunosuppressed state, he is at high risk for relapse  Therefore, I would start him on p o  vancomycin  For now, I will continue IV Flagyl concurrently until patient is further improved  At that time, IV Flagyl can be discontinued  With no other obvious active infection, we should discontinue all systemic antibiotic to prevent further antibiotic pressure on the colonic violeta and allow C difficile colitis to resolve more quickly  Start p o  vancomycin  Continue IV Flagyl for now  Discontinue IV vancomycin/IV cefepime  Follow-up on pending blood cultures  Monitor temperature/WBC  Monitor hemodynamics  2   C difficile colitis  This is most likely chemotherapy effect on colonic violeta, since patient has not received any antibiotic recently  Patient is clinically improved on IV Flagyl  Will add p o  vancomycin, as in above  Fortunately, patient's abdominal exam is benign  P o  vancomycin/IV Flagyl, as in above  Likely discontinue IV Flagyl in the next 1-2 days, if patient continues to improve    Continue p o  vancomycin times at least 14 days, possibly longer  Monitor diarrhea  Monitor abdominal pain  Monitor temperature/WBC  3   Febrile neutropenia  Source of fever is most likely C difficile colitis above  Patient was neutropenic on admission with ANC of 250  His in [address] is increased to 830 this morning  All blood cultures are negative  At this point, especially with severe C difficile colitis, all systemic antibiotics should be discontinued  P o  vancomycin/IV Flagyl, as in above  Discontinue all other systemic antibiotic  4   ARABELLA, superimposed on CKD  This is most likely secondary to septic shock  Creatinine is already improved overnight, back down to baseline  Both p o  vancomycin and IV Flagyl do not need to have dosages adjusted  Monitor creatinine  5   Autoimmune hemolytic anemia  Patient is status post initiation of new chemotherapy regimen of Cytoxan/Rituxan  Given that C difficile colitis is most likely side effect/toxicity of this chemotherapy regimen, if patient is to continue the same chemotherapy regimen, he may be a candidate for p o  vancomycin suppression while on chemotherapy  Chemotherapy plan per Hematology/Oncology Service  Monitor ANC  Consider p o  vancomycin suppression with future chemotherapy, if stay with Cytoxan/Rituxan  Outpatient note reviewed in detail  Discussed with patient in detail regarding the above plan  Discussed with Dr Km Maxwell from [facility] service  Thank you for this consultation  We will follow along with you  HISTORY OF PRESENT ILLNESS:  Reason for Consult:  Septic shock  C difficile colitis  HPI: Charlsie Scheuermann is a 60 y o  male, with autoimmune hemolytic anemia, had been on long-term steroid and status post splenectomy without improvement in transfusion requirement  Last week, patient started his 1st course of chemotherapy with Cytoxan and Rituxan    Patient did well initially until 2 days prior to presentation when he started developing fever, chills, dizziness, diarrhea and LQ abdominal pain  For these reasons, he came into the ER yesterday  On presentation, patient did not have fever but had neutropenia  He was hypotensive  Patient was admitted to ICU  He was vancomycin, cefepime and Flagyl  Patient did well, with resolution of hypotension with fluid and steroid  His stool C diff toxin came back positive  For all these reasons, we are asked to evaluate the patient  At present, patient feels a lot better  Chills have resolved  Dizziness has resolved  Diarrhea much decreasing with resolved abdominal pain  Patient denies any recent antibiotic  Patient denies any other focal symptoms  Patient does not know how many more chemotherapeutic courses he is planning to get  REVIEW OF SYSTEMS:  A complete 12 point system-based review of systems is otherwise negative  PAST MEDICAL HISTORY:  Past Medical History:   Diagnosis Date    Autoimmune hemolytic anemia (HCC)     DVT (deep venous thrombosis) (HCC)     GERD (gastroesophageal reflux disease)     Hemolytic anemia (HCC)     Palpitation     Portal vein thrombosis     Pulmonary emboli (HCC)     Tobacco abuse      Past Surgical History:   Procedure Laterality Date    KNEE SURGERY Right     MO LAP,CHOLECYSTECTOMY/GRAPH N/A 12/23/2017    Procedure: CHOLECYSTECTOMY LAPAROSCOPIC with cholangiogram;  Surgeon: Mya Gaytan MD;  Location: [facility];  Service: General    MO REMOVAL SPLEEN, TOTAL N/A 5/18/2017    Procedure: LAPAROSCOPIC HAND ASSIST SPLENECTOMY;  Surgeon: Thais Gutierrez MD;  Location: [facility];  Service: Surgical Oncology    SHOULDER SURGERY Left      Problem list reviewed      FAMILY HISTORY:  Non-contributory    SOCIAL HISTORY:  History   Alcohol Use    3 6 oz/week    6 Cans of beer per week     Comment: social     History   Drug Use No     History   Smoking Status    Light Tobacco Smoker    Types: Cigars   Smokeless Tobacco    Current User     Comment: socially       ALLERGIES:  Allergies Allergen Reactions    Iodinated Diagnostic Agents Hives       MEDICATIONS:  All current active medications have been reviewed  Patient is currently on vancomycin/cefepime/Flagyl  PHYSICAL EXAM:  Vitals:  HR:  [77-90] 87  Resp:  [14-19] 16  BP: (124-165)/(60-96) 165/96  SpO2:  [94 %-97 %] 97 %  Temp (24hrs), Av 1 °F (36 7 °C), Min:97 4 °F (36 3 °C), Max:98 5 °F (36 9 °C)  Current: Temperature: 98 °F (36 7 °C)     Physical Exam:  General:  Well-nourished, well-developed, in no acute distress  Awake, alert and oriented x 3  Eyes:  Conjunctive clear with no hemorrhages or effusions  Oropharynx:  No ulcers, no lesions, pharynx benign, no tonsillitis  Neck:  Supple, no lymphadenopathy, no mass, nontender  Lungs:  Expansion symmetric, no rales, no wheezing, no accessory muscle use  Cardiac:  Mildly tachycardic with regular rhythm, normal S1, normal S2, no murmurs  Abdomen:  Soft, mildly distended, mild LQ tenderness, no HSM  Extremities:  Trace edema, no erythema, nontender  No ulcers  Skin:  No rashes, no ulcers  Neurological:  Moves all four extremities spontaneously, sensation grossly intact    LABS, IMAGING, & OTHER STUDIES:  Lab Results:  I have personally reviewed pertinent labs      Results from last 7 days  Lab Units 18  0422 18  0947   SODIUM mmol/L 143 135*   POTASSIUM mmol/L 3 9 3 7   CHLORIDE mmol/L 110* 97*   CO2 mmol/L 24 25   ANION GAP mmol/L 9 13   BUN mg/dL 33* 32*   CREATININE mg/dL 1 17 2 31*   EGFR ml/min/1 73sq m 66 29   GLUCOSE RANDOM mg/dL 122 122   CALCIUM mg/dL 8 8 8 8   AST U/L  --  52*   ALT U/L  --  83*   ALK PHOS U/L  --  94   TOTAL PROTEIN g/dL  --  6 6   BILIRUBIN TOTAL mg/dL  --  8 82*       Results from last 7 days  Lab Units 18  0957 18  0422 18  0947   WBC Thousand/uL  --  3 94* 3 14*   HEMOGLOBIN g/dL 7 2* 7 1* 9 4*   PLATELETS Thousands/uL  --  253 312       Results from last 7 days  Lab Units 18  1550 18  1408 18  0949 03/29/18  0947   BLOOD CULTURE   --   --  No Growth at 24 hrs  No Growth at 24 hrs  INFLUENZA A PCR   --  None Detected  --   --    INFLUENZA B PCR   --  None Detected  --   --    RSV PCR   --  None Detected  --   --    C DIFF TOXIN B  POSITIVE for C difficle toxin by PCR  *  --   --   --        Imaging Studies:   I have personally reviewed pertinent imaging study reports and images in PACS  CXR reviewed personally  No infiltrates or consolidations  Abdomen/pelvis CT reviewed personally  There are mild inflammatory changes in the sigmoid colon  There is hepatomegaly  EKG, Pathology, and Other Studies:   I have personally reviewed pertinent reports

## 2018-03-31 NOTE — PLAN OF CARE
DISCHARGE PLANNING     Discharge to home or other facility with appropriate resources Progressing        HEMATOLOGIC - ADULT     Maintains hematologic stability Progressing        INFECTION - ADULT     Absence or prevention of progression during hospitalization Progressing     Absence of fever/infection during neutropenic period Progressing        Knowledge Deficit     Patient/family/caregiver demonstrates understanding of disease process, treatment plan, medications, and discharge instructions Progressing        Nutrition/Hydration-ADULT     Nutrient/Hydration intake appropriate for improving, restoring or maintaining nutritional needs Progressing        PAIN - ADULT     Verbalizes/displays adequate comfort level or baseline comfort level Progressing        SAFETY ADULT     Patient will remain free of falls Progressing     Maintain or return to baseline ADL function Progressing     Maintain or return mobility status to optimal level Progressing

## 2018-04-01 VITALS
SYSTOLIC BLOOD PRESSURE: 149 MMHG | HEART RATE: 76 BPM | RESPIRATION RATE: 17 BRPM | DIASTOLIC BLOOD PRESSURE: 81 MMHG | TEMPERATURE: 98.8 F | BODY MASS INDEX: 29.9 KG/M2 | WEIGHT: 197.31 LBS | OXYGEN SATURATION: 98 % | HEIGHT: 68 IN

## 2018-04-01 LAB
ABO GROUP BLD BPU: NORMAL
ABO GROUP BLD BPU: NORMAL
ALBUMIN SERPL BCP-MCNC: 3 G/DL (ref 3.5–5)
ALP SERPL-CCNC: 55 U/L (ref 46–116)
ALT SERPL W P-5'-P-CCNC: 36 U/L (ref 12–78)
ANION GAP SERPL CALCULATED.3IONS-SCNC: 11 MMOL/L (ref 4–13)
ANISOCYTOSIS BLD QL SMEAR: PRESENT
AST SERPL W P-5'-P-CCNC: 15 U/L (ref 5–45)
BASOPHILS # BLD MANUAL: 0 THOUSAND/UL (ref 0–0.1)
BASOPHILS NFR MAR MANUAL: 0 % (ref 0–1)
BILIRUB SERPL-MCNC: 1.81 MG/DL (ref 0.2–1)
BPU ID: NORMAL
BPU ID: NORMAL
BUN SERPL-MCNC: 23 MG/DL (ref 5–25)
CALCIUM SERPL-MCNC: 8.8 MG/DL (ref 8.3–10.1)
CHLORIDE SERPL-SCNC: 109 MMOL/L (ref 100–108)
CO2 SERPL-SCNC: 25 MMOL/L (ref 21–32)
CREAT SERPL-MCNC: 0.88 MG/DL (ref 0.6–1.3)
CROSSMATCH: NORMAL
CROSSMATCH: NORMAL
EOSINOPHIL # BLD MANUAL: 0.3 THOUSAND/UL (ref 0–0.4)
EOSINOPHIL NFR BLD MANUAL: 4 % (ref 0–6)
GFR SERPL CREATININE-BSD FRML MDRD: 91 ML/MIN/1.73SQ M
GLUCOSE SERPL-MCNC: 77 MG/DL (ref 65–140)
HCT VFR BLD AUTO: 26.8 % (ref 36.5–49.3)
HGB BLD-MCNC: 8.8 G/DL (ref 12–17)
HOWELL-JOLLY BOD BLD QL SMEAR: PRESENT
LYMPHOCYTES # BLD AUTO: 3.18 THOUSAND/UL (ref 0.6–4.47)
LYMPHOCYTES # BLD AUTO: 42 % (ref 14–44)
MCH RBC QN AUTO: 37.1 PG (ref 26.8–34.3)
MCHC RBC AUTO-ENTMCNC: 32.8 G/DL (ref 31.4–37.4)
MCV RBC AUTO: 113 FL (ref 82–98)
METAMYELOCYTES NFR BLD MANUAL: 1 % (ref 0–1)
MONOCYTES # BLD AUTO: 1.51 THOUSAND/UL (ref 0–1.22)
MONOCYTES NFR BLD: 20 % (ref 4–12)
NEUTROPHILS # BLD MANUAL: 2.42 THOUSAND/UL (ref 1.85–7.62)
NEUTS BAND NFR BLD MANUAL: 4 % (ref 0–8)
NEUTS SEG NFR BLD AUTO: 28 % (ref 43–75)
NRBC BLD AUTO-RTO: 1 /100 WBC (ref 0–2)
NRBC BLD AUTO-RTO: 4 /100 WBCS
PLATELET # BLD AUTO: 320 THOUSANDS/UL (ref 149–390)
PLATELET BLD QL SMEAR: ADEQUATE
PMV BLD AUTO: 10.8 FL (ref 8.9–12.7)
POLYCHROMASIA BLD QL SMEAR: PRESENT
POTASSIUM SERPL-SCNC: 3.3 MMOL/L (ref 3.5–5.3)
PROT SERPL-MCNC: 5.6 G/DL (ref 6.4–8.2)
RBC # BLD AUTO: 2.37 MILLION/UL (ref 3.88–5.62)
SODIUM SERPL-SCNC: 145 MMOL/L (ref 136–145)
SPHEROCYTES BLD QL SMEAR: PRESENT
TARGETS BLD QL SMEAR: PRESENT
TOTAL CELLS COUNTED SPEC: 100
UNIT DISPENSE STATUS: NORMAL
UNIT DISPENSE STATUS: NORMAL
UNIT PRODUCT CODE: NORMAL
UNIT PRODUCT CODE: NORMAL
UNIT RH: NORMAL
UNIT RH: NORMAL
VARIANT LYMPHS # BLD AUTO: 1 %
WBC # BLD AUTO: 7.57 THOUSAND/UL (ref 4.31–10.16)

## 2018-04-01 PROCEDURE — 80053 COMPREHEN METABOLIC PANEL: CPT | Performed by: INTERNAL MEDICINE

## 2018-04-01 PROCEDURE — 85027 COMPLETE CBC AUTOMATED: CPT | Performed by: INTERNAL MEDICINE

## 2018-04-01 PROCEDURE — 99239 HOSP IP/OBS DSCHRG MGMT >30: CPT | Performed by: INTERNAL MEDICINE

## 2018-04-01 PROCEDURE — 85007 BL SMEAR W/DIFF WBC COUNT: CPT | Performed by: INTERNAL MEDICINE

## 2018-04-01 RX ORDER — NICOTINE 21 MG/24HR
1 PATCH, TRANSDERMAL 24 HOURS TRANSDERMAL DAILY
Qty: 28 PATCH | Refills: 0 | Status: SHIPPED | OUTPATIENT
Start: 2018-04-02 | End: 2018-05-07 | Stop reason: ALTCHOICE

## 2018-04-01 RX ORDER — POTASSIUM CHLORIDE 20 MEQ/1
20 TABLET, EXTENDED RELEASE ORAL ONCE
Status: COMPLETED | OUTPATIENT
Start: 2018-04-01 | End: 2018-04-01

## 2018-04-01 RX ADMIN — RIVAROXABAN 20 MG: 20 TABLET, FILM COATED ORAL at 08:01

## 2018-04-01 RX ADMIN — VANCOMYCIN 125 MG: KIT at 10:50

## 2018-04-01 RX ADMIN — VANCOMYCIN 125 MG: KIT at 05:00

## 2018-04-01 RX ADMIN — PREDNISONE 20 MG: 20 TABLET ORAL at 08:01

## 2018-04-01 RX ADMIN — METRONIDAZOLE 500 MG: 500 INJECTION, SOLUTION INTRAVENOUS at 04:56

## 2018-04-01 RX ADMIN — ASPIRIN 81 MG 81 MG: 81 TABLET ORAL at 08:01

## 2018-04-01 RX ADMIN — POTASSIUM CHLORIDE 20 MEQ: 1500 TABLET, EXTENDED RELEASE ORAL at 11:19

## 2018-04-01 RX ADMIN — METRONIDAZOLE 500 MG: 500 INJECTION, SOLUTION INTRAVENOUS at 11:19

## 2018-04-01 RX ADMIN — PANTOPRAZOLE SODIUM 40 MG: 40 TABLET, DELAYED RELEASE ORAL at 05:00

## 2018-04-01 NOTE — PROGRESS NOTES
Received call from Tatiana Nieto in blood bank stating that the unit of blood from [facility] was incompatible for the patient and it is not likely that they will have a new unit of blood tonight  Patient was updated regarding status of blood transfusion

## 2018-04-01 NOTE — DISCHARGE SUMMARY
Discharge Summary - [facility]    Patient Information: Urszula Nealuty 60 y o  male MRN: [id number]  Unit/Bed#: [place] [facility] 2 [facility] 216-01 Encounter: [id number]    Discharging Physician / Practitioner: Katina Luna MD  PCP: Zee Albarran DO  Admission Date: 3/29/2018  Discharge Date: 04/01/18    Reason for Admission:  Septic shock    Discharge Diagnoses:  C diff colitis    Principal Problem:    Severe sepsis ([facility] )  Active Problems:    Jaundice    Hyperbilirubinemia    GERD (gastroesophageal reflux disease)    Autoimmune hemolytic anemia ([facility] )    Acute kidney injury ([facility] )  Resolved Problems:    * No resolved hospital problems  *      Consultations During Hospital Stay:  · Infectious Disease, Oncology    Procedures Performed:     Ct Abdomen Pelvis Wo Contrast    Result Date: 3/29/2018  Narrative: CT ABDOMEN AND PELVIS WITHOUT IV CONTRAST INDICATION:   Abdominal pain, Diarrhea, Acute renal insufficiency  COMPARISON: CT abdomen pelvis 12/21/2017, MRI abdomen 12/22/2017 TECHNIQUE:  CT examination of the abdomen and pelvis was performed without intravenous contrast   Axial, sagittal, and coronal 2D reformatted images were created from the source data and submitted for interpretation  Radiation dose length product (DLP) for this visit:  750 mGy-cm   This examination, like all CT scans performed in the [facility], was performed utilizing techniques to minimize radiation dose exposure, including the use of iterative reconstruction and automated exposure control  Enteric contrast was administered  FINDINGS: ABDOMEN Evaluation of the solid organs is limited without IV contrast  LOWER CHEST:  No clinically significant abnormality identified in the visualized lower chest  LIVER/BILIARY TREE:  Liver is enlarged measuring 20 8 cm in length  No focal pathology detected  Difficult to evaluate fatty liver changes in the absence of internal reference (spleen)    Patient had documented fatty liver changes on previous MRI  GALLBLADDER:  Gallbladder is surgically absent  SPLEEN:  There has been prior splenectomy  No suspicious abnormality in the splenectomy space  Embolization coils are noted  PANCREAS:  Mild atrophy without acute findings  ADRENAL GLANDS:  Unremarkable  KIDNEYS/URETERS:  Unremarkable  No hydronephrosis  Mild nonspecific bilateral perinephric stranding  STOMACH AND BOWEL:  The stomach is suboptimally distended, evaluation limited  The small bowel is normal caliber  Diffuse colonic diverticulosis  There is equivocal fat streaking seen adjacent to the right sigmoid colon (series 2 images 60-62)  Part of this appearance may reflect streak artifact from high density oral contrast in the small bowel  APPENDIX:  A normal appendix is visualized  ABDOMINOPELVIC CAVITY:  No ascites or free intraperitoneal air  9 mm peripancreatic lymph node series 2/38  VESSELS:  Unremarkable for patient's age  PELVIS REPRODUCTIVE ORGANS:  The prostate is enlarged  URINARY BLADDER:  Unremarkable  ABDOMINAL WALL/INGUINAL REGIONS:  Fat-containing bilateral inguinal hernias  OSSEOUS STRUCTURES:  No acute fracture or destructive osseous lesion  Degenerative changes throughout the thoracal lumbar spine most notably disc disease at L3-4, L4-5 and L5-S1  Impression: Equivocal fat streaking adjacent to the right sigmoid colon  Although my suspicion is low, very mild acute diverticulitis cannot be entirely excluded  No evidence of abscess or acute intra-abdominal abnormality otherwise  Hepatomegaly  Limited study without IV contrast  Workstation performed: [id number]     X-ray Chest 1 View Portable    Result Date: 3/29/2018  Narrative: CHEST INDICATION:   Fever, cough  COMPARISON:  Chest radiographs May 18, 2018 EXAM PERFORMED/VIEWS:  XR CHEST PORTABLE Images: 2 FINDINGS: Heart shadow appears unremarkable  Atherosclerotic vascular calcifications are noted  Lung markings are crowded secondary to low lung volumes  Within limitations of this examination there is no focal airspace opacity to suggest pneumonia  No pneumothorax or pleural effusion  Vascular coils left upper quadrant, stable  Osseous structures appear within normal limits for patient age  Postoperative changes left shoulder  Impression: No active pulmonary disease on examination which is somewhat limited secondary to low lung volumes  Workstation performed: [id number]         Significant Findings:     · 16-year-old male who is transfusion steroid dependent autoimmune hemolytic anemia and just started his 1st course of chemotherapy with Cytoxan and Rituxan week prior to admission day of admission developed hypotension neutropenic fever and diarrhea  · Autoimmune hemolytic anemia he had 2 additional units of packed red blood cells given  · Placed on empiric management to include vancomycin and cefepime IV due to neutropenia with an [address] of only 250 on presentation  This steadily increased while on IV Flagyl and vancomycin and cefepime  Stools now formed  · Subsequent stools C diff PCR proved positive  And now is to complete a course of vancomycin p o  125 mg every 6 hours as 2 5 mL of 125 every 6  For next 2 weeks  · Consideration should be made toward vancomycin suppression with future chemotherapy as discussed with the patient today he is equivocal as to returning to chemotherapy and he is to confer with Dr Mary Aguillon man and with Oncology  · In addition of vancomycin p o  was given a prescription for nicotine patch for smoking cessation  · In regards to his pulmonary embolus history is to continue on Xarelto as prior autoimmune hemolytic anemia will continue be treated with prednisone until further interventions with Oncology    Incidental Findings:   · Discharge white count 3 1 with an ANC of 830    Testesults Pending at Discharge (will require follow up):         Outpatient Tests Requested:  · None    Complications:  None other than minor hypokalemia repleted    Hospital Course:     Veronica Navarro is a 60 y o  male patient who originally presented to the hospital on 3/29/2018 due to sepsis /with neutropenic fever  Please see above significant findings for hospital course and treatment plan    Condition at Discharge: good     Discharge Day Visit / Exam:     * Please refer to separate progress for these details *    Discharge instructions/Information to patient and family:   See after visit summary for information provided to patient and family  Provisions for Follow-Up Care:  See after visit summary for information related to follow-up care and any pertinent home health orders  Disposition:     Home    For Discharges to [facility] SNF:   · Not Applicable to this Patient - Not Applicable to this Patient      Discharge Statement:  I spent 56 minutes discharging the patient  This time was spent on the day of discharge  I had direct contact with the patient on the day of discharge  Greater than 50% of the total time was spent examining patient, answering all patient questions, arranging and discussing plan of care with patient as well as directly providing post-discharge instructions  Additional time then spent on discharge activities  Discharge Medications:  See after visit summary for reconciled discharge medications provided to patient and family  ** Please Note: Dragon 360 Dictation voice to text software may have been used in the creation of this document   **

## 2018-04-03 LAB
BACTERIA BLD CULT: NORMAL
BACTERIA BLD CULT: NORMAL

## 2018-04-05 ENCOUNTER — APPOINTMENT (OUTPATIENT)
Dept: LAB | Facility: MEDICAL CENTER | Age: 63
End: 2018-04-05
Payer: COMMERCIAL

## 2018-04-05 ENCOUNTER — TELEPHONE (OUTPATIENT)
Dept: HEMATOLOGY ONCOLOGY | Facility: CLINIC | Age: 63
End: 2018-04-05

## 2018-04-05 DIAGNOSIS — D59.10 ANEMIA, AUTOIMMUNE HEMOLYTIC (HCC): ICD-10-CM

## 2018-04-05 LAB
ALBUMIN SERPL BCP-MCNC: 3.7 G/DL (ref 3.5–5)
ALP SERPL-CCNC: 68 U/L (ref 46–116)
ALT SERPL W P-5'-P-CCNC: 42 U/L (ref 12–78)
ANION GAP SERPL CALCULATED.3IONS-SCNC: 8 MMOL/L (ref 4–13)
ANISOCYTOSIS BLD QL SMEAR: PRESENT
AST SERPL W P-5'-P-CCNC: 27 U/L (ref 5–45)
BASOPHILS # BLD MANUAL: 0 THOUSAND/UL (ref 0–0.1)
BASOPHILS NFR MAR MANUAL: 0 % (ref 0–1)
BILIRUB SERPL-MCNC: 1.95 MG/DL (ref 0.2–1)
BUN SERPL-MCNC: 14 MG/DL (ref 5–25)
CALCIUM SERPL-MCNC: 8.8 MG/DL (ref 8.3–10.1)
CHLORIDE SERPL-SCNC: 108 MMOL/L (ref 100–108)
CO2 SERPL-SCNC: 26 MMOL/L (ref 21–32)
CREAT SERPL-MCNC: 0.97 MG/DL (ref 0.6–1.3)
EOSINOPHIL # BLD MANUAL: 0 THOUSAND/UL (ref 0–0.4)
EOSINOPHIL NFR BLD MANUAL: 0 % (ref 0–6)
ERYTHROCYTE [DISTWIDTH] IN BLOOD BY AUTOMATED COUNT: 23.8 % (ref 11.6–15.1)
GFR SERPL CREATININE-BSD FRML MDRD: 82 ML/MIN/1.73SQ M
GLUCOSE P FAST SERPL-MCNC: 162 MG/DL (ref 65–99)
HCT VFR BLD AUTO: 36.7 % (ref 36.5–49.3)
HGB BLD-MCNC: 11.3 G/DL (ref 12–17)
LYMPHOCYTES # BLD AUTO: 3.46 THOUSAND/UL (ref 0.6–4.47)
LYMPHOCYTES # BLD AUTO: 39 % (ref 14–44)
MACROCYTES BLD QL AUTO: PRESENT
MCH RBC QN AUTO: 35.8 PG (ref 26.8–34.3)
MCHC RBC AUTO-ENTMCNC: 30.8 G/DL (ref 31.4–37.4)
MCV RBC AUTO: 116 FL (ref 82–98)
METAMYELOCYTES NFR BLD MANUAL: 1 % (ref 0–1)
MONOCYTES # BLD AUTO: 0.98 THOUSAND/UL (ref 0–1.22)
MONOCYTES NFR BLD: 11 % (ref 4–12)
MYELOCYTES NFR BLD MANUAL: 2 % (ref 0–1)
NEUTROPHILS # BLD MANUAL: 4.17 THOUSAND/UL (ref 1.85–7.62)
NEUTS BAND NFR BLD MANUAL: 4 % (ref 0–8)
NEUTS SEG NFR BLD AUTO: 43 % (ref 43–75)
NRBC BLD AUTO-RTO: 10 /100 WBCS
NRBC BLD AUTO-RTO: 14 /100 WBC (ref 0–2)
PLATELET # BLD AUTO: 630 THOUSANDS/UL (ref 149–390)
PLATELET BLD QL SMEAR: ABNORMAL
PMV BLD AUTO: 10.9 FL (ref 8.9–12.7)
POLYCHROMASIA BLD QL SMEAR: PRESENT
POTASSIUM SERPL-SCNC: 3.4 MMOL/L (ref 3.5–5.3)
PROT SERPL-MCNC: 6.5 G/DL (ref 6.4–8.2)
RBC # BLD AUTO: 3.16 MILLION/UL (ref 3.88–5.62)
RBC MORPH BLD: PRESENT
SODIUM SERPL-SCNC: 142 MMOL/L (ref 136–145)
WBC # BLD AUTO: 8.88 THOUSAND/UL (ref 4.31–10.16)

## 2018-04-05 PROCEDURE — 85007 BL SMEAR W/DIFF WBC COUNT: CPT

## 2018-04-05 PROCEDURE — 36415 COLL VENOUS BLD VENIPUNCTURE: CPT

## 2018-04-05 PROCEDURE — 85027 COMPLETE CBC AUTOMATED: CPT

## 2018-04-05 PROCEDURE — 80053 COMPREHEN METABOLIC PANEL: CPT

## 2018-04-05 NOTE — TELEPHONE ENCOUNTER
Requesting to speak with Patrica  Has questions about medications and lab results--and a couple more things  Epidermal Closure: vertical mattress

## 2018-04-06 NOTE — TELEPHONE ENCOUNTER
Discussed with Dr Huang Courser - Patient feeling much better this week - hgb = 11 3  Patient will taper prednisone to 10mg PO daily from 20  CBC to be repeated in one week  He is completing treatment for c-diff    Patient will call with any additional questions or concerns

## 2018-04-12 ENCOUNTER — TELEPHONE (OUTPATIENT)
Dept: HEMATOLOGY ONCOLOGY | Facility: CLINIC | Age: 63
End: 2018-04-12

## 2018-04-12 ENCOUNTER — APPOINTMENT (OUTPATIENT)
Dept: LAB | Facility: MEDICAL CENTER | Age: 63
End: 2018-04-12
Payer: COMMERCIAL

## 2018-04-12 DIAGNOSIS — D59.10 ANEMIA, AUTOIMMUNE HEMOLYTIC (HCC): ICD-10-CM

## 2018-04-12 LAB
ALBUMIN SERPL BCP-MCNC: 4.3 G/DL (ref 3.5–5)
ALP SERPL-CCNC: 70 U/L (ref 46–116)
ALT SERPL W P-5'-P-CCNC: 33 U/L (ref 12–78)
ANION GAP SERPL CALCULATED.3IONS-SCNC: 8 MMOL/L (ref 4–13)
AST SERPL W P-5'-P-CCNC: 28 U/L (ref 5–45)
BASOPHILS # BLD AUTO: 0.25 THOUSANDS/ΜL (ref 0–0.1)
BASOPHILS NFR BLD AUTO: 4 % (ref 0–1)
BILIRUB SERPL-MCNC: 3.78 MG/DL (ref 0.2–1)
BUN SERPL-MCNC: 26 MG/DL (ref 5–25)
CALCIUM SERPL-MCNC: 9.1 MG/DL
CHLORIDE SERPL-SCNC: 106 MMOL/L (ref 100–108)
CO2 SERPL-SCNC: 27 MMOL/L (ref 21–32)
CREAT SERPL-MCNC: 1.04 MG/DL (ref 0.6–1.3)
EOSINOPHIL # BLD AUTO: 0.37 THOUSAND/ΜL (ref 0–0.61)
EOSINOPHIL NFR BLD AUTO: 5 % (ref 0–6)
ERYTHROCYTE [DISTWIDTH] IN BLOOD BY AUTOMATED COUNT: 19.1 % (ref 11.6–15.1)
GFR SERPL CREATININE-BSD FRML MDRD: 76 ML/MIN/1.73SQ M
GLUCOSE P FAST SERPL-MCNC: 93 MG/DL (ref 65–99)
HCT VFR BLD AUTO: 34.4 % (ref 36.5–49.3)
HGB BLD-MCNC: 10.8 G/DL (ref 12–17)
LYMPHOCYTES # BLD AUTO: 3.25 THOUSANDS/ΜL (ref 0.6–4.47)
LYMPHOCYTES NFR BLD AUTO: 48 % (ref 14–44)
MCH RBC QN AUTO: 36.7 PG (ref 26.8–34.3)
MCHC RBC AUTO-ENTMCNC: 31.4 G/DL (ref 31.4–37.4)
MCV RBC AUTO: 117 FL (ref 82–98)
MONOCYTES # BLD AUTO: 0.97 THOUSAND/ΜL (ref 0.17–1.22)
MONOCYTES NFR BLD AUTO: 14 % (ref 4–12)
NEUTROPHILS # BLD AUTO: 1.96 THOUSANDS/ΜL (ref 1.85–7.62)
NEUTS SEG NFR BLD AUTO: 29 % (ref 43–75)
NRBC BLD AUTO-RTO: 1 /100 WBCS
PLATELET # BLD AUTO: 659 THOUSANDS/UL (ref 149–390)
PMV BLD AUTO: 10.5 FL (ref 8.9–12.7)
POTASSIUM SERPL-SCNC: 3.5 MMOL/L (ref 3.5–5.3)
PROT SERPL-MCNC: 6.7 G/DL (ref 6.4–8.2)
RBC # BLD AUTO: 2.94 MILLION/UL (ref 3.88–5.62)
SODIUM SERPL-SCNC: 141 MMOL/L (ref 136–145)
WBC # BLD AUTO: 6.82 THOUSAND/UL (ref 4.31–10.16)

## 2018-04-12 PROCEDURE — 36415 COLL VENOUS BLD VENIPUNCTURE: CPT

## 2018-04-12 PROCEDURE — 85025 COMPLETE CBC W/AUTO DIFF WBC: CPT

## 2018-04-12 PROCEDURE — 80053 COMPREHEN METABOLIC PANEL: CPT

## 2018-04-17 ENCOUNTER — OFFICE VISIT (OUTPATIENT)
Dept: HEMATOLOGY ONCOLOGY | Facility: CLINIC | Age: 63
End: 2018-04-17
Payer: COMMERCIAL

## 2018-04-17 ENCOUNTER — TELEPHONE (OUTPATIENT)
Dept: HEMATOLOGY ONCOLOGY | Facility: CLINIC | Age: 63
End: 2018-04-17

## 2018-04-17 ENCOUNTER — APPOINTMENT (OUTPATIENT)
Dept: LAB | Facility: HOSPITAL | Age: 63
End: 2018-04-17
Attending: INTERNAL MEDICINE
Payer: COMMERCIAL

## 2018-04-17 VITALS
TEMPERATURE: 97.6 F | DIASTOLIC BLOOD PRESSURE: 80 MMHG | WEIGHT: 198 LBS | BODY MASS INDEX: 30.01 KG/M2 | SYSTOLIC BLOOD PRESSURE: 142 MMHG | HEIGHT: 68 IN | RESPIRATION RATE: 18 BRPM | HEART RATE: 94 BPM

## 2018-04-17 DIAGNOSIS — D59.11 HEMOLYTIC ANEMIA DUE TO WARM ANTIBODY (HCC): ICD-10-CM

## 2018-04-17 DIAGNOSIS — D69.6 AUTOIMMUNE HEMOLYTIC ANEMIA WITH IMMUNE THROMBOCYTOPENIA (HCC): Primary | ICD-10-CM

## 2018-04-17 DIAGNOSIS — D59.11 HEMOLYTIC ANEMIA DUE TO WARM ANTIBODY (HCC): Primary | ICD-10-CM

## 2018-04-17 DIAGNOSIS — D59.10 AUTOIMMUNE HEMOLYTIC ANEMIA WITH IMMUNE THROMBOCYTOPENIA (HCC): Primary | ICD-10-CM

## 2018-04-17 LAB
ALBUMIN SERPL BCP-MCNC: 4.4 G/DL (ref 3.5–5)
ALP SERPL-CCNC: 91 U/L (ref 46–116)
ALT SERPL W P-5'-P-CCNC: 37 U/L (ref 12–78)
ANION GAP SERPL CALCULATED.3IONS-SCNC: 7 MMOL/L (ref 4–13)
ANISOCYTOSIS BLD QL SMEAR: PRESENT
AST SERPL W P-5'-P-CCNC: 38 U/L (ref 5–45)
BASOPHILS # BLD MANUAL: 0 THOUSAND/UL (ref 0–0.1)
BASOPHILS NFR MAR MANUAL: 0 % (ref 0–1)
BILIRUB SERPL-MCNC: 3.73 MG/DL (ref 0.2–1)
BUN SERPL-MCNC: 24 MG/DL (ref 5–25)
CALCIUM SERPL-MCNC: 9.4 MG/DL (ref 8.3–10.1)
CHLORIDE SERPL-SCNC: 108 MMOL/L (ref 100–108)
CO2 SERPL-SCNC: 28 MMOL/L (ref 21–32)
CREAT SERPL-MCNC: 1.05 MG/DL (ref 0.6–1.3)
EOSINOPHIL # BLD MANUAL: 0.72 THOUSAND/UL (ref 0–0.4)
EOSINOPHIL NFR BLD MANUAL: 4 % (ref 0–6)
ERYTHROCYTE [DISTWIDTH] IN BLOOD BY AUTOMATED COUNT: 26.4 % (ref 11.6–15.1)
GFR SERPL CREATININE-BSD FRML MDRD: 75 ML/MIN/1.73SQ M
GIANT PLATELETS BLD QL SMEAR: PRESENT
GLUCOSE SERPL-MCNC: 92 MG/DL (ref 65–140)
HCT VFR BLD AUTO: 26.6 % (ref 36.5–49.3)
HGB BLD-MCNC: 9 G/DL (ref 12–17)
HOWELL-JOLLY BOD BLD QL SMEAR: PRESENT
LYMPHOCYTES # BLD AUTO: 15 % (ref 14–44)
LYMPHOCYTES # BLD AUTO: 2.71 THOUSAND/UL (ref 0.6–4.47)
MACROCYTES BLD QL AUTO: PRESENT
MCH RBC QN AUTO: 43.3 PG (ref 26.8–34.3)
MCHC RBC AUTO-ENTMCNC: 33.8 G/DL (ref 31.4–37.4)
MCV RBC AUTO: 128 FL (ref 82–98)
METAMYELOCYTES NFR BLD MANUAL: 1 % (ref 0–1)
MONOCYTES # BLD AUTO: 1.26 THOUSAND/UL (ref 0–1.22)
MONOCYTES NFR BLD: 7 % (ref 4–12)
MYELOCYTES NFR BLD MANUAL: 1 % (ref 0–1)
NEUTROPHILS # BLD MANUAL: 13 THOUSAND/UL (ref 1.85–7.62)
NEUTS BAND NFR BLD MANUAL: 1 % (ref 0–8)
NEUTS SEG NFR BLD AUTO: 71 % (ref 43–75)
NRBC BLD AUTO-RTO: 12 /100 WBC (ref 0–2)
NRBC BLD AUTO-RTO: 13 /100 WBCS
PLATELET # BLD AUTO: 723 THOUSANDS/UL (ref 149–390)
PLATELET BLD QL SMEAR: ABNORMAL
PMV BLD AUTO: 9.9 FL (ref 8.9–12.7)
POLYCHROMASIA BLD QL SMEAR: PRESENT
POTASSIUM SERPL-SCNC: 4.4 MMOL/L (ref 3.5–5.3)
PROT SERPL-MCNC: 6.9 G/DL (ref 6.4–8.2)
RBC # BLD AUTO: 2.08 MILLION/UL (ref 3.88–5.62)
RBC MORPH BLD: PRESENT
SODIUM SERPL-SCNC: 143 MMOL/L (ref 136–145)
WBC # BLD AUTO: 18.06 THOUSAND/UL (ref 4.31–10.16)

## 2018-04-17 PROCEDURE — 85027 COMPLETE CBC AUTOMATED: CPT

## 2018-04-17 PROCEDURE — 80053 COMPREHEN METABOLIC PANEL: CPT

## 2018-04-17 PROCEDURE — 99215 OFFICE O/P EST HI 40 MIN: CPT | Performed by: INTERNAL MEDICINE

## 2018-04-17 PROCEDURE — 36415 COLL VENOUS BLD VENIPUNCTURE: CPT

## 2018-04-17 PROCEDURE — 1111F DSCHRG MED/CURRENT MED MERGE: CPT | Performed by: INTERNAL MEDICINE

## 2018-04-17 PROCEDURE — 85007 BL SMEAR W/DIFF WBC COUNT: CPT

## 2018-04-17 NOTE — TELEPHONE ENCOUNTER
Patient arranged for 2 units PRBC on Friday 4/20/18 - next cycle of chemo scheduled for Thursday 4/26/18

## 2018-04-17 NOTE — TELEPHONE ENCOUNTER
Pt called for lab results  Reviewed hgb with pt  Requesting you call him to discuss chemo    [phone]

## 2018-04-17 NOTE — PROGRESS NOTES
Virginia Celestinman  1955  [id number] [facility] [address] [address] [phone]    Chief Complaint   Patient presents with    Follow-up          No history exists  History of Present Illness:  -Quinton Kapadia, DO:  -Previous Therapy (if not listed in Oncology History):  -Current Therapy (if not listed in Oncology History):  -Disease Status:  -Interval History:  -Tumor Markers:    Review of Systems:  Review of Systems   Constitutional: Negative for chills and fever  HENT: Negative for nosebleeds  Eyes: Negative for discharge  Respiratory: Negative for cough and shortness of breath  Cardiovascular: Negative for chest pain  Gastrointestinal: Negative for abdominal pain, constipation and diarrhea  Endocrine: Negative for polydipsia  Genitourinary: Negative for hematuria  Musculoskeletal: Negative for arthralgias  Skin: Negative for color change  Allergic/Immunologic: Negative for immunocompromised state  Neurological: Negative for dizziness and headaches  Hematological: Negative for adenopathy  Psychiatric/Behavioral: Negative for agitation         Patient Active Problem List   Diagnosis    Tobacco abuse    Palpitation    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)    Jaundice    Hyperbilirubinemia    Hemolytic anemia (HCC)    Symptomatic anemia    Acute pulmonary embolism (HCC)    Portal vein thrombosis    Elevated blood pressure reading without diagnosis of hypertension    GERD (gastroesophageal reflux disease)    Autoimmune hemolytic anemia (HCC)    Biliary colic    Obstipation    Calculus of gallbladder with acute on chronic cholecystitis without obstruction    Severe sepsis (HCC)    Acute kidney injury ([facility] [address] )     Past Medical History:   Diagnosis Date    Autoimmune hemolytic anemia (HCC)     DVT (deep venous thrombosis) (HCC)     GERD (gastroesophageal reflux disease)     Hemolytic anemia (HCC)     Palpitation     Portal vein thrombosis     Pulmonary emboli (HCC)     Tobacco abuse      Past Surgical History:   Procedure Laterality Date    KNEE SURGERY Right     PA LAP,CHOLECYSTECTOMY/GRAPH N/A 12/23/2017    Procedure: CHOLECYSTECTOMY LAPAROSCOPIC with cholangiogram;  Surgeon: José Niño MD;  [facility];  Service: General    PA REMOVAL SPLEEN, TOTAL N/A 5/18/2017    Procedure: LAPAROSCOPIC HAND ASSIST SPLENECTOMY;  Surgeon: Pati Martinez MD;  [facility];  Service: Surgical Oncology    SHOULDER SURGERY Left      Family History   Problem Relation Age of Onset    No Known Problems Mother     No Known Problems Father      Social History     Social History    Marital status: /Civil Union     Spouse name: N/A    Number of children: N/A    Years of education: N/A     Occupational History    Not on file       Social History Main Topics    Smoking status: Light Tobacco Smoker     Types: Cigars    Smokeless tobacco: Current User      Comment: socially    Alcohol use 3 6 oz/week     6 Cans of beer per week      Comment: social    Drug use: No    Sexual activity: Not on file     Other Topics Concern    Not on file     Social History Narrative    No narrative on file       Current Outpatient Prescriptions:     aspirin 81 MG tablet, Take 81 mg by mouth daily, Disp: , Rfl:     cyanocobalamin (VITAMIN B-12) 1,000 mcg tablet, Take 1,000 mcg by mouth daily, Disp: , Rfl:     ergocalciferol (VITAMIN D2) 50,000 units, Take 50,000 Units by mouth once a week  , Disp: , Rfl:     Ferrous Sulfate (IRON) 325 (65 FE) MG TABS, Take 325 mg by mouth daily  , Disp: , Rfl:     influenza inactivated quadrivalent vaccine (FLULAVAL) 0 5 ML ANALILIA, Inject 0 5 mL into the shoulder, thigh, or buttocks prior to discharge (preventative) for up to 1 dose, Disp: 1 Syringe, Rfl: 0    Multiple Vitamins-Minerals (ONE DAILY MENS) TABS, Take by mouth, Disp: , Rfl:    nicotine (NICODERM CQ) 14 mg/24hr TD 24 hr patch, Place 1 patch on the skin daily, Disp: 28 patch, Rfl: 0    pantoprazole (PROTONIX) 40 mg tablet, Take 1 tablet by mouth daily in the early morning, Disp: 30 tablet, Rfl: 0    predniSONE 20 mg tablet, Take 1 tablet by mouth daily (Patient taking differently: Take 10 mg by mouth every other day  ), Disp: 60 tablet, Rfl: 0    rivaroxaban (XARELTO) 20 mg tablet, Take 1 tablet (20 mg total) by mouth daily with breakfast, Disp: 30 tablet, Rfl: 1  Allergies   Allergen Reactions    Iodinated Diagnostic Agents Hives     Vitals:    04/17/18 0907   BP: 142/80   Pulse: 94   Resp: 18   Temp: 97 6 °F (36 4 °C)         Physical Exam   Constitutional: He is oriented to person, place, and time  He appears well-developed  HENT:   Head: Normocephalic  Eyes: Pupils are equal, round, and reactive to light  Neck: Neck supple  Cardiovascular: Normal rate and regular rhythm  No murmur heard  Pulmonary/Chest: Breath sounds normal  He has no wheezes  He has no rales  Abdominal: Soft  There is no tenderness  Musculoskeletal: Normal range of motion  He exhibits no edema or tenderness  Lymphadenopathy:     He has no cervical adenopathy  Neurological: He is alert and oriented to person, place, and time  He has normal reflexes  No cranial nerve deficit  Skin: No rash noted  No erythema  Psychiatric: He has a normal mood and affect  His behavior is normal            Performance Status: ECOG/Zubrod/WHO: 1 - Symptomatic but completely ambulatory    Labs:  CBC, Coags, BMP, Mg, Phos     Imaging  Ct Abdomen Pelvis Wo Contrast    Result Date: 3/29/2018  Narrative: CT ABDOMEN AND PELVIS WITHOUT IV CONTRAST INDICATION:   Abdominal pain, Diarrhea, Acute renal insufficiency   COMPARISON: CT abdomen pelvis 12/21/2017, MRI abdomen 12/22/2017 TECHNIQUE:  CT examination of the abdomen and pelvis was performed without intravenous contrast   Axial, sagittal, and coronal 2D reformatted images were created from the source data and submitted for interpretation  Radiation dose length product (DLP) for this visit:  750 mGy-cm   This examination, like all CT scans performed in the [facility], was performed utilizing techniques to minimize radiation dose exposure, including the use of iterative reconstruction and automated exposure control  Enteric contrast was administered  FINDINGS: ABDOMEN Evaluation of the solid organs is limited without IV contrast  LOWER CHEST:  No clinically significant abnormality identified in the visualized lower chest  LIVER/BILIARY TREE:  Liver is enlarged measuring 20 8 cm in length  No focal pathology detected  Difficult to evaluate fatty liver changes in the absence of internal reference (spleen)  Patient had documented fatty liver changes on previous MRI  GALLBLADDER:  Gallbladder is surgically absent  SPLEEN:  There has been prior splenectomy  No suspicious abnormality in the splenectomy space  Embolization coils are noted  PANCREAS:  Mild atrophy without acute findings  ADRENAL GLANDS:  Unremarkable  KIDNEYS/URETERS:  Unremarkable  No hydronephrosis  Mild nonspecific bilateral perinephric stranding  STOMACH AND BOWEL:  The stomach is suboptimally distended, evaluation limited  The small bowel is normal caliber  Diffuse colonic diverticulosis  There is equivocal fat streaking seen adjacent to the right sigmoid colon (series 2 images 60-62)  Part of this appearance may reflect streak artifact from high density oral contrast in the small bowel  APPENDIX:  A normal appendix is visualized  ABDOMINOPELVIC CAVITY:  No ascites or free intraperitoneal air  9 mm peripancreatic lymph node series 2/38  VESSELS:  Unremarkable for patient's age  PELVIS REPRODUCTIVE ORGANS:  The prostate is enlarged  URINARY BLADDER:  Unremarkable  ABDOMINAL WALL/INGUINAL REGIONS:  Fat-containing bilateral inguinal hernias   OSSEOUS STRUCTURES:  No acute fracture or destructive osseous lesion  Degenerative changes throughout the thoracal lumbar spine most notably disc disease at L3-4, L4-5 and L5-S1  Impression: Equivocal fat streaking adjacent to the right sigmoid colon  Although my suspicion is low, very mild acute diverticulitis cannot be entirely excluded  No evidence of abscess or acute intra-abdominal abnormality otherwise  Hepatomegaly  Limited study without IV contrast  Workstation performed: [id number]     X-ray Chest 1 View Portable    Result Date: 3/29/2018  Narrative: CHEST INDICATION:   Fever, cough  COMPARISON:  Chest radiographs May 18, 2018 EXAM PERFORMED/VIEWS:  XR CHEST PORTABLE Images: 2 FINDINGS: Heart shadow appears unremarkable  Atherosclerotic vascular calcifications are noted  Lung markings are crowded secondary to low lung volumes  Within limitations of this examination there is no focal airspace opacity to suggest pneumonia  No pneumothorax or pleural effusion  Vascular coils left upper quadrant, stable  Osseous structures appear within normal limits for patient age  Postoperative changes left shoulder  Impression: No active pulmonary disease on examination which is somewhat limited secondary to low lung volumes  Workstation performed: [id number]     I reviewed the above laboratory and imaging data  Discussion/Summary:  In summary, this is a 26-year-old male history of autoimmune hemolytic anemia  He has recovered from recent hospitalization  In late March 2018 he was admitted after 2-3 days of diarrhea, increasing fever, fatigue, etc   He was found to have C diff  He was treated with vancomycin with gradual improvement  Interestingly, hemoglobin increased to 11 3  This was partly attributable to transfusions  Despite previous transfusions, however, he had not been able to achieve a hemoglobin of 11 3  Additionally, bilirubin was somewhat depressed attesting to decreased hemolytic process    This suggests that he was benefitted by Cytoxan, Rituxan  I would be inclined to repeat this  He is currently taking probiotics as well as Activia  I suggested continuing with this as a prophylactic measure  Additionally, I suggested that the 1st loose bowel movement that may occur should prompt a phone call  Vancomycin and Flagyl could be instituted promptly hopefully avert ring further problems  I reviewed the above considerations at length with the patient  He voiced understanding and agreement

## 2018-04-18 ENCOUNTER — APPOINTMENT (OUTPATIENT)
Dept: LAB | Facility: CLINIC | Age: 63
End: 2018-04-18
Payer: COMMERCIAL

## 2018-04-18 DIAGNOSIS — D59.10 AUTOIMMUNE HEMOLYTIC ANEMIA WITH IMMUNE THROMBOCYTOPENIA (HCC): Primary | ICD-10-CM

## 2018-04-18 DIAGNOSIS — D69.6 AUTOIMMUNE HEMOLYTIC ANEMIA WITH IMMUNE THROMBOCYTOPENIA (HCC): Primary | ICD-10-CM

## 2018-04-18 PROCEDURE — 86870 RBC ANTIBODY IDENTIFICATION: CPT

## 2018-04-18 PROCEDURE — 86850 RBC ANTIBODY SCREEN: CPT

## 2018-04-18 PROCEDURE — 86921 COMPATIBILITY TEST INCUBATE: CPT

## 2018-04-18 PROCEDURE — 86902 BLOOD TYPE ANTIGEN DONOR EA: CPT

## 2018-04-18 PROCEDURE — 86905 BLOOD TYPING RBC ANTIGENS: CPT

## 2018-04-18 PROCEDURE — 86945 BLOOD PRODUCT/IRRADIATION: CPT

## 2018-04-18 PROCEDURE — 86901 BLOOD TYPING SEROLOGIC RH(D): CPT

## 2018-04-18 PROCEDURE — 86906 BLD TYPING SEROLOGIC RH PHNT: CPT

## 2018-04-18 PROCEDURE — 86860 RBC ANTIBODY ELUTION: CPT

## 2018-04-18 PROCEDURE — 86900 BLOOD TYPING SEROLOGIC ABO: CPT

## 2018-04-18 PROCEDURE — 36415 COLL VENOUS BLD VENIPUNCTURE: CPT

## 2018-04-18 PROCEDURE — 86880 COOMBS TEST DIRECT: CPT

## 2018-04-18 PROCEDURE — 86922 COMPATIBILITY TEST ANTIGLOB: CPT

## 2018-04-19 LAB — BLOOD GROUP ANTIBODIES SERPL: NORMAL

## 2018-04-20 ENCOUNTER — HOSPITAL ENCOUNTER (OUTPATIENT)
Dept: INFUSION CENTER | Facility: CLINIC | Age: 63
Discharge: HOME | End: 2018-04-20
Payer: COMMERCIAL

## 2018-04-20 VITALS
RESPIRATION RATE: 18 BRPM | DIASTOLIC BLOOD PRESSURE: 95 MMHG | SYSTOLIC BLOOD PRESSURE: 156 MMHG | HEART RATE: 81 BPM | TEMPERATURE: 98.3 F

## 2018-04-20 PROCEDURE — 36430 TRANSFUSION BLD/BLD COMPNT: CPT

## 2018-04-20 PROCEDURE — P9016 RBC LEUKOCYTES REDUCED: HCPCS

## 2018-04-20 NOTE — PROGRESS NOTES
Pt tolerated 2 units PRBC without complications  Pt aware of next appointment and was discharged in stable condition  AVS provided

## 2018-04-20 NOTE — PLAN OF CARE
Problem: Potential for Falls  Goal: Patient will remain free of falls  INTERVENTIONS:  - Assess patient frequently for physical needs  -  Identify cognitive and physical deficits and behaviors that affect risk of falls    -  [place] fall precautions as indicated by assessment   - Educate patient/family on patient safety including physical limitations  - Instruct patient to call for assistance with activity based on assessment  - Modify environment to reduce risk of injury  - Consider OT/PT consult to assist with strengthening/mobility   Outcome: Progressing

## 2018-04-25 RX ORDER — ACETAMINOPHEN 325 MG/1
650 TABLET ORAL ONCE
Status: COMPLETED | OUTPATIENT
Start: 2018-04-26 | End: 2018-04-26

## 2018-04-25 RX ORDER — SODIUM CHLORIDE 9 MG/ML
20 INJECTION, SOLUTION INTRAVENOUS CONTINUOUS
Status: DISCONTINUED | OUTPATIENT
Start: 2018-04-26 | End: 2018-04-29 | Stop reason: HOSPADM

## 2018-04-26 ENCOUNTER — HOSPITAL ENCOUNTER (OUTPATIENT)
Dept: INFUSION CENTER | Facility: CLINIC | Age: 63
Discharge: HOME | End: 2018-04-26
Payer: COMMERCIAL

## 2018-04-26 VITALS
SYSTOLIC BLOOD PRESSURE: 163 MMHG | BODY MASS INDEX: 30.04 KG/M2 | RESPIRATION RATE: 18 BRPM | HEIGHT: 68 IN | TEMPERATURE: 98.3 F | DIASTOLIC BLOOD PRESSURE: 88 MMHG | HEART RATE: 93 BPM | WEIGHT: 198.19 LBS

## 2018-04-26 LAB
ALBUMIN SERPL BCP-MCNC: 3.6 G/DL (ref 3.5–5)
ALP SERPL-CCNC: 72 U/L (ref 46–116)
ALT SERPL W P-5'-P-CCNC: 23 U/L (ref 12–78)
ANION GAP SERPL CALCULATED.3IONS-SCNC: 11 MMOL/L (ref 4–13)
ANISOCYTOSIS BLD QL SMEAR: PRESENT
AST SERPL W P-5'-P-CCNC: 40 U/L (ref 5–45)
BASOPHILS # BLD AUTO: 0 THOUSAND/UL (ref 0–0.1)
BASOPHILS NFR MAR MANUAL: 0 % (ref 0–1)
BILIRUB SERPL-MCNC: 3.1 MG/DL (ref 0.2–1)
BUN SERPL-MCNC: 19 MG/DL (ref 5–25)
CALCIUM SERPL-MCNC: 8.8 MG/DL (ref 8.3–10.1)
CHLORIDE SERPL-SCNC: 107 MMOL/L (ref 100–108)
CO2 SERPL-SCNC: 26 MMOL/L (ref 21–32)
CREAT SERPL-MCNC: 0.8 MG/DL (ref 0.6–1.3)
EOSINOPHIL # BLD AUTO: 2.09 THOUSAND/UL (ref 0–0.61)
EOSINOPHIL NFR BLD MANUAL: 12 % (ref 0–6)
ERYTHROCYTE [DISTWIDTH] IN BLOOD BY AUTOMATED COUNT: 23.9 % (ref 11.6–15.1)
GFR SERPL CREATININE-BSD FRML MDRD: 94 ML/MIN/1.73SQ M
GLUCOSE SERPL-MCNC: 120 MG/DL (ref 65–140)
HCT VFR BLD AUTO: 31.5 % (ref 36.5–49.3)
HGB BLD-MCNC: 10.2 G/DL (ref 12–17)
LYMPHOCYTES # BLD AUTO: 1.91 THOUSAND/UL (ref 0.6–4.47)
LYMPHOCYTES # BLD AUTO: 11 % (ref 14–44)
MACROCYTES BLD QL AUTO: PRESENT
MCH RBC QN AUTO: 37.4 PG (ref 26.8–34.3)
MCHC RBC AUTO-ENTMCNC: 32.4 G/DL (ref 31.4–37.4)
MCV RBC AUTO: 115 FL (ref 82–98)
MONOCYTES # BLD AUTO: 0.87 THOUSAND/UL (ref 0–1.22)
MONOCYTES NFR BLD AUTO: 5 % (ref 4–12)
NEUTS BAND NFR BLD MANUAL: 1 % (ref 0–8)
NEUTS SEG # BLD: 12.53 THOUSAND/UL (ref 1.81–6.82)
NEUTS SEG NFR BLD AUTO: 71 % (ref 43–75)
PLATELET # BLD AUTO: 445 THOUSANDS/UL (ref 149–390)
PLATELET BLD QL SMEAR: ABNORMAL
PMV BLD AUTO: 7.8 FL (ref 8.9–12.7)
POLYCHROMASIA BLD QL SMEAR: PRESENT
POTASSIUM SERPL-SCNC: 3.5 MMOL/L (ref 3.5–5.3)
PROT SERPL-MCNC: 5.8 G/DL (ref 6.4–8.2)
RBC # BLD AUTO: 2.73 MILLION/UL (ref 3.88–5.62)
SODIUM SERPL-SCNC: 144 MMOL/L (ref 136–145)
TOTAL CELLS COUNTED SPEC: 100
WBC # BLD AUTO: 17.4 THOUSAND/UL (ref 4.31–10.16)
WBC NRBC COR # BLD: 17.4 THOUSAND/UL (ref 4.31–10.16)

## 2018-04-26 PROCEDURE — 96417 CHEMO IV INFUS EACH ADDL SEQ: CPT

## 2018-04-26 PROCEDURE — 96375 TX/PRO/DX INJ NEW DRUG ADDON: CPT

## 2018-04-26 PROCEDURE — 85007 BL SMEAR W/DIFF WBC COUNT: CPT | Performed by: INTERNAL MEDICINE

## 2018-04-26 PROCEDURE — 96415 CHEMO IV INFUSION ADDL HR: CPT

## 2018-04-26 PROCEDURE — 80053 COMPREHEN METABOLIC PANEL: CPT | Performed by: INTERNAL MEDICINE

## 2018-04-26 PROCEDURE — 96413 CHEMO IV INFUSION 1 HR: CPT

## 2018-04-26 PROCEDURE — 85027 COMPLETE CBC AUTOMATED: CPT | Performed by: INTERNAL MEDICINE

## 2018-04-26 RX ADMIN — SODIUM CHLORIDE 20 ML/HR: 0.9 INJECTION, SOLUTION INTRAVENOUS at 09:20

## 2018-04-26 RX ADMIN — DIPHENHYDRAMINE HYDROCHLORIDE 25 MG: 50 INJECTION, SOLUTION INTRAMUSCULAR; INTRAVENOUS at 10:27

## 2018-04-26 RX ADMIN — CYCLOPHOSPHAMIDE 1000 MG: 1 INJECTION, POWDER, FOR SOLUTION INTRAVENOUS; ORAL at 13:57

## 2018-04-26 RX ADMIN — ACETAMINOPHEN 650 MG: 325 TABLET, FILM COATED ORAL at 10:15

## 2018-04-26 RX ADMIN — RITUXIMAB 746 MG: 10 INJECTION, SOLUTION INTRAVENOUS at 11:05

## 2018-04-26 RX ADMIN — DEXAMETHASONE SODIUM PHOSPHATE: 10 INJECTION, SOLUTION INTRAMUSCULAR; INTRAVENOUS at 10:12

## 2018-04-26 RX ADMIN — SODIUM CHLORIDE 1000 ML: 0.9 INJECTION, SOLUTION INTRAVENOUS at 11:05

## 2018-04-26 NOTE — PROGRESS NOTES
Pt  Denies new symptoms or concerns at this time  Labs obtained as ordered  Rituxan and Cytoxan ordered for infusion today

## 2018-04-26 NOTE — PLAN OF CARE
Problem: PAIN - ADULT  Goal: Verbalizes/displays adequate comfort level or baseline comfort level  Interventions:  - Encourage patient to monitor pain and request assistance  - Assess pain using appropriate pain scale  - Administer analgesics based on type and severity of pain and evaluate response  - Implement non-pharmacological measures as appropriate and evaluate response  - Consider cultural and social influences on pain and pain management  - Notify physician/advanced practitioner if interventions unsuccessful or patient reports new pain  Outcome: Progressing      Problem: INFECTION - ADULT  Goal: Absence or prevention of progression during hospitalization  INTERVENTIONS:  - Assess and monitor for signs and symptoms of infection  - Monitor lab/diagnostic results  - Monitor all insertion sites, i e  indwelling lines, tubes, and drains  - Monitor endotracheal (as able) and nasal secretions for changes in amount and color  - [place] appropriate cooling/warming therapies per order  - Administer medications as ordered  - Instruct and encourage patient and family to use good hand hygiene technique  - Identify and instruct in appropriate isolation precautions for identified infection/condition  Outcome: Progressing    Goal: Absence of fever/infection during neutropenic period  INTERVENTIONS:  - Monitor WBC  - Implement neutropenic guidelines  Outcome: Progressing      Problem: Knowledge Deficit  Goal: Patient/family/caregiver demonstrates understanding of disease process, treatment plan, medications, and discharge instructions  Complete learning assessment and assess knowledge base    Interventions:  - Provide teaching at level of understanding  - Provide teaching via preferred learning methods  Outcome: Progressing

## 2018-04-26 NOTE — PROGRESS NOTES
Pt  Tolerated Rituxan and Cytoxan without adverse event  Future appointments reviewed  AVS provided

## 2018-05-07 ENCOUNTER — OFFICE VISIT (OUTPATIENT)
Dept: HEMATOLOGY ONCOLOGY | Facility: CLINIC | Age: 63
End: 2018-05-07
Payer: COMMERCIAL

## 2018-05-07 VITALS
WEIGHT: 195.3 LBS | OXYGEN SATURATION: 97 % | SYSTOLIC BLOOD PRESSURE: 140 MMHG | HEART RATE: 85 BPM | BODY MASS INDEX: 29.6 KG/M2 | HEIGHT: 68 IN | RESPIRATION RATE: 16 BRPM | DIASTOLIC BLOOD PRESSURE: 82 MMHG | TEMPERATURE: 98.2 F

## 2018-05-07 DIAGNOSIS — D59.11 HEMOLYTIC ANEMIA DUE TO WARM ANTIBODY (HCC): Primary | ICD-10-CM

## 2018-05-07 PROBLEM — A41.9 SEVERE SEPSIS (HCC): Status: RESOLVED | Noted: 2018-03-29 | Resolved: 2018-05-07

## 2018-05-07 PROBLEM — K80.12 CALCULUS OF GALLBLADDER WITH ACUTE ON CHRONIC CHOLECYSTITIS WITHOUT OBSTRUCTION: Status: RESOLVED | Noted: 2017-12-21 | Resolved: 2018-05-07

## 2018-05-07 PROBLEM — R65.20 SEVERE SEPSIS (HCC): Status: RESOLVED | Noted: 2018-03-29 | Resolved: 2018-05-07

## 2018-05-07 PROBLEM — K80.50 BILIARY COLIC: Status: RESOLVED | Noted: 2017-12-21 | Resolved: 2018-05-07

## 2018-05-07 PROBLEM — N17.9 ACUTE KIDNEY INJURY (HCC): Status: RESOLVED | Noted: 2018-03-29 | Resolved: 2018-05-07

## 2018-05-07 PROBLEM — K59.00 OBSTIPATION: Status: RESOLVED | Noted: 2017-12-21 | Resolved: 2018-05-07

## 2018-05-07 PROCEDURE — 99214 OFFICE O/P EST MOD 30 MIN: CPT | Performed by: INTERNAL MEDICINE

## 2018-05-07 NOTE — PROGRESS NOTES
Ld Martinezbury  1955  [address] [facility] [place]  [address] [address] [phone]    Chief Complaint   Patient presents with    Follow-up          No history exists  History of Present Illness:  -No ref  provider found:    -Interval History:  Patient has been feeling fairly well  Good energy  Recently mowed his lawn for an hour  Review of Systems:  Review of Systems   Constitutional: Negative for chills and fever  HENT: Negative for nosebleeds  Eyes: Negative for discharge  Respiratory: Negative for cough and shortness of breath  Cardiovascular: Negative for chest pain  Gastrointestinal: Negative for abdominal pain, constipation and diarrhea  Endocrine: Negative for polydipsia  Genitourinary: Negative for hematuria  Musculoskeletal: Negative for arthralgias  Skin: Negative for color change  Allergic/Immunologic: Negative for immunocompromised state  Neurological: Negative for dizziness and headaches  Hematological: Negative for adenopathy  Psychiatric/Behavioral: Negative for agitation         Patient Active Problem List   Diagnosis    Tobacco abuse    Palpitation    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)    Jaundice    Hyperbilirubinemia    Symptomatic anemia    Acute pulmonary embolism (HCC)    Portal vein thrombosis    Elevated blood pressure reading without diagnosis of hypertension    GERD (gastroesophageal reflux disease)    Biliary colic    Obstipation    Calculus of gallbladder with acute on chronic cholecystitis without obstruction    Severe sepsis (HCC)    Acute kidney injury ([facility] [address] )     Past Medical History:   Diagnosis Date    Autoimmune hemolytic anemia (HCC)     DVT (deep venous thrombosis) (HCC)     GERD (gastroesophageal reflux disease)     Hemolytic anemia (HCC)     Palpitation     Portal vein thrombosis     Pulmonary emboli (HCC)     Tobacco abuse Past Surgical History:   Procedure Laterality Date    KNEE SURGERY Right     ID LAP,CHOLECYSTECTOMY/GRAPH N/A 12/23/2017    Procedure: CHOLECYSTECTOMY LAPAROSCOPIC with cholangiogram;  Surgeon: Magi King MD;  [facility];  Service: General    ID REMOVAL SPLEEN, TOTAL N/A 5/18/2017    Procedure: LAPAROSCOPIC HAND ASSIST SPLENECTOMY;  Surgeon: Eleni Awad MD;  [facility];  Service: Surgical Oncology    SHOULDER SURGERY Left      Family History   Problem Relation Age of Onset    No Known Problems Mother     No Known Problems Father      Social History     Social History    Marital status: /Civil Union     Spouse name: N/A    Number of children: N/A    Years of education: N/A     Occupational History    Not on file       Social History Main Topics    Smoking status: Light Tobacco Smoker     Types: Cigars    Smokeless tobacco: Current User      Comment: socially    Alcohol use 3 6 oz/week     6 Cans of beer per week      Comment: social    Drug use: No    Sexual activity: Not on file     Other Topics Concern    Not on file     Social History Narrative    No narrative on file       Current Outpatient Prescriptions:     aspirin 81 MG tablet, Take 81 mg by mouth daily, Disp: , Rfl:     cyanocobalamin (VITAMIN B-12) 1,000 mcg tablet, Take 1,000 mcg by mouth daily, Disp: , Rfl:     ergocalciferol (VITAMIN D2) 50,000 units, Take 50,000 Units by mouth once a week  , Disp: , Rfl:     Multiple Vitamins-Minerals (ONE DAILY MENS) TABS, Take by mouth, Disp: , Rfl:     pantoprazole (PROTONIX) 40 mg tablet, Take 1 tablet by mouth daily in the early morning, Disp: 30 tablet, Rfl: 0    predniSONE 20 mg tablet, Take 1 tablet by mouth daily (Patient taking differently: Take 10 mg by mouth daily  ), Disp: 60 tablet, Rfl: 0    rivaroxaban (XARELTO) 20 mg tablet, Take 1 tablet (20 mg total) by mouth daily with breakfast, Disp: 30 tablet, Rfl: 1    influenza inactivated quadrivalent vaccine (FLULAVAL) 0 5 ML ANALILIA, Inject 0 5 mL into the shoulder, thigh, or buttocks prior to discharge (preventative) for up to 1 dose, Disp: 1 Syringe, Rfl: 0  Allergies   Allergen Reactions    Iodinated Diagnostic Agents Hives     Vitals:    05/07/18 1003   BP: 140/82   Pulse: 85   Resp: 16   Temp: 98 2 °F (36 8 °C)   SpO2: 97%         Physical Exam   Constitutional: He is oriented to person, place, and time  He appears well-developed  HENT:   Head: Normocephalic  Eyes: Pupils are equal, round, and reactive to light  Neck: Neck supple  Cardiovascular: Normal rate and regular rhythm  No murmur heard  Pulmonary/Chest: Breath sounds normal  He has no wheezes  He has no rales  Abdominal: Soft  There is no tenderness  Musculoskeletal: Normal range of motion  He exhibits no edema or tenderness  Lymphadenopathy:     He has no cervical adenopathy  Neurological: He is alert and oriented to person, place, and time  He has normal reflexes  No cranial nerve deficit  Skin: No rash noted  No erythema  Psychiatric: He has a normal mood and affect  His behavior is normal            Performance Status: ECOG/Zubrod/WHO: 1 - Symptomatic but completely ambulatory    Labs:  CBC, Coags, BMP, Mg, Phos     Imaging  No results found  I reviewed the above laboratory and imaging data  Discussion/Summary:  In summary, this is a 40-year-old male history of autoimmune hemolytic anemia  Clinically he is doing well  He has had 2 treatments with Cytoxan and Rituxan  Repeat blood work is requested a week from today  Repeat chemotherapy for cycle 3 May 17  If all is well I would anticipate 6 cycles in total   Observation to follow     Currently he is taking prednisone 10 mg p  o  daily  I asked him to change to 10 mg every other day  I reviewed the above with the patient  He voiced understanding and agreement

## 2018-05-07 NOTE — LETTER
May 7, 2018     Gerson Mcclure, [address] [facility],[address]  [id number] [place] [address]    Patient: Quinn Machuca   YOB: 1955   Date of Visit: 5/7/2018       Dear Dr Donna Cadet: Thank you for referring Sudhakarrobina Barkleys to me for evaluation  Below are my notes for this consultation  If you have questions, please do not hesitate to call me  I look forward to following your patient along with you  Sincerely,        Ness Villagran DO        CC: No Recipients  Ness Villagran DO  5/7/2018 10:11 AM  Sign at close encounter  Quinn Machuca  1955  [address] [address] [phone]    Chief Complaint   Patient presents with    Follow-up          No history exists  History of Present Illness:  -No ref  provider found:    -Interval History:      Review of Systems:  Review of Systems   Constitutional: Negative for chills and fever  HENT: Negative for nosebleeds  Eyes: Negative for discharge  Respiratory: Negative for cough and shortness of breath  Cardiovascular: Negative for chest pain  Gastrointestinal: Negative for abdominal pain, constipation and diarrhea  Endocrine: Negative for polydipsia  Genitourinary: Negative for hematuria  Musculoskeletal: Negative for arthralgias  Skin: Negative for color change  Allergic/Immunologic: Negative for immunocompromised state  Neurological: Negative for dizziness and headaches  Hematological: Negative for adenopathy  Psychiatric/Behavioral: Negative for agitation         Patient Active Problem List   Diagnosis    Tobacco abuse    Palpitation    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)    Jaundice    Hyperbilirubinemia    Symptomatic anemia    Acute pulmonary embolism (HCC)    Portal vein thrombosis    Elevated blood pressure reading without diagnosis of hypertension    GERD (gastroesophageal reflux disease)    Biliary colic    Obstipation    Calculus of gallbladder with acute on chronic cholecystitis without obstruction    Severe sepsis (HCC)    Acute kidney injury ([facility] [address] )     Past Medical History:   Diagnosis Date    Autoimmune hemolytic anemia (HCC)     DVT (deep venous thrombosis) (HCC)     GERD (gastroesophageal reflux disease)     Hemolytic anemia (HCC)     Palpitation     Portal vein thrombosis     Pulmonary emboli (HCC)     Tobacco abuse      Past Surgical History:   Procedure Laterality Date    KNEE SURGERY Right     FL LAP,CHOLECYSTECTOMY/GRAPH N/A 12/23/2017    Procedure: CHOLECYSTECTOMY LAPAROSCOPIC with cholangiogram;  Surgeon: Mikey James MD;  [facility];  Service: General    FL REMOVAL SPLEEN, TOTAL N/A 5/18/2017    Procedure: LAPAROSCOPIC HAND ASSIST SPLENECTOMY;  Surgeon: Carlton Hernandez MD;  [facility];  Service: Surgical Oncology    SHOULDER SURGERY Left      Family History   Problem Relation Age of Onset    No Known Problems Mother     No Known Problems Father      Social History     Social History    Marital status: /Civil Union     Spouse name: N/A    Number of children: N/A    Years of education: N/A     Occupational History    Not on file       Social History Main Topics    Smoking status: Light Tobacco Smoker     Types: Cigars    Smokeless tobacco: Current User      Comment: socially    Alcohol use 3 6 oz/week     6 Cans of beer per week      Comment: social    Drug use: No    Sexual activity: Not on file     Other Topics Concern    Not on file     Social History Narrative    No narrative on file       Current Outpatient Prescriptions:     aspirin 81 MG tablet, Take 81 mg by mouth daily, Disp: , Rfl:     cyanocobalamin (VITAMIN B-12) 1,000 mcg tablet, Take 1,000 mcg by mouth daily, Disp: , Rfl:     ergocalciferol (VITAMIN D2) 50,000 units, Take 50,000 Units by mouth once a week  , Disp: , Rfl:     Multiple Vitamins-Minerals (ONE DAILY MENS) TABS, Take by mouth, Disp: , Rfl:     pantoprazole (PROTONIX) 40 mg tablet, Take 1 tablet by mouth daily in the early morning, Disp: 30 tablet, Rfl: 0    predniSONE 20 mg tablet, Take 1 tablet by mouth daily (Patient taking differently: Take 10 mg by mouth daily  ), Disp: 60 tablet, Rfl: 0    rivaroxaban (XARELTO) 20 mg tablet, Take 1 tablet (20 mg total) by mouth daily with breakfast, Disp: 30 tablet, Rfl: 1    influenza inactivated quadrivalent vaccine (FLULAVAL) 0 5 ML ANALILIA, Inject 0 5 mL into the shoulder, thigh, or buttocks prior to discharge (preventative) for up to 1 dose, Disp: 1 Syringe, Rfl: 0  Allergies   Allergen Reactions    Iodinated Diagnostic Agents Hives     Vitals:    05/07/18 1003   BP: 140/82   Pulse: 85   Resp: 16   Temp: 98 2 °F (36 8 °C)   SpO2: 97%         Physical Exam   Constitutional: He is oriented to person, place, and time  He appears well-developed  HENT:   Head: Normocephalic  Eyes: Pupils are equal, round, and reactive to light  Neck: Neck supple  Cardiovascular: Normal rate and regular rhythm  No murmur heard  Pulmonary/Chest: Breath sounds normal  He has no wheezes  He has no rales  Abdominal: Soft  There is no tenderness  Musculoskeletal: Normal range of motion  He exhibits no edema or tenderness  Lymphadenopathy:     He has no cervical adenopathy  Neurological: He is alert and oriented to person, place, and time  He has normal reflexes  No cranial nerve deficit  Skin: No rash noted  No erythema  Psychiatric: He has a normal mood and affect  His behavior is normal            Performance Status: ECOG/Zubrod/WHO: 1 - Symptomatic but completely ambulatory    Labs:  CBC, Coags, BMP, Mg, Phos     Imaging  No results found  I reviewed the above laboratory and imaging data        Discussion/Summary:

## 2018-05-14 ENCOUNTER — TELEPHONE (OUTPATIENT)
Dept: HEMATOLOGY ONCOLOGY | Facility: CLINIC | Age: 63
End: 2018-05-14

## 2018-05-14 ENCOUNTER — APPOINTMENT (OUTPATIENT)
Dept: LAB | Facility: MEDICAL CENTER | Age: 63
End: 2018-05-14
Payer: COMMERCIAL

## 2018-05-14 DIAGNOSIS — D59.10 ANEMIA, AUTOIMMUNE HEMOLYTIC (HCC): ICD-10-CM

## 2018-05-14 LAB
ALBUMIN SERPL BCP-MCNC: 4 G/DL (ref 3.5–5)
ALP SERPL-CCNC: 88 U/L (ref 46–116)
ALT SERPL W P-5'-P-CCNC: 44 U/L (ref 12–78)
ANION GAP SERPL CALCULATED.3IONS-SCNC: 8 MMOL/L (ref 4–13)
AST SERPL W P-5'-P-CCNC: 45 U/L (ref 5–45)
BILIRUB SERPL-MCNC: 5.2 MG/DL (ref 0.2–1)
BUN SERPL-MCNC: 14 MG/DL (ref 5–25)
CALCIUM SERPL-MCNC: 9.5 MG/DL (ref 8.3–10.1)
CHLORIDE SERPL-SCNC: 108 MMOL/L (ref 100–108)
CO2 SERPL-SCNC: 26 MMOL/L (ref 21–32)
CREAT SERPL-MCNC: 0.96 MG/DL (ref 0.6–1.3)
GFR SERPL CREATININE-BSD FRML MDRD: 83 ML/MIN/1.73SQ M
GLUCOSE P FAST SERPL-MCNC: 91 MG/DL (ref 65–99)
POTASSIUM SERPL-SCNC: 4.8 MMOL/L (ref 3.5–5.3)
PROT SERPL-MCNC: 6.2 G/DL (ref 6.4–8.2)
SODIUM SERPL-SCNC: 142 MMOL/L (ref 136–145)

## 2018-05-14 PROCEDURE — 36415 COLL VENOUS BLD VENIPUNCTURE: CPT

## 2018-05-14 PROCEDURE — 80053 COMPREHEN METABOLIC PANEL: CPT

## 2018-05-14 PROCEDURE — 85027 COMPLETE CBC AUTOMATED: CPT

## 2018-05-14 PROCEDURE — 85007 BL SMEAR W/DIFF WBC COUNT: CPT

## 2018-05-14 PROCEDURE — 85025 COMPLETE CBC W/AUTO DIFF WBC: CPT

## 2018-05-14 NOTE — TELEPHONE ENCOUNTER
Pt called for labs and informed hgb 9 3 which he is concerned about  Has chemo Thursday  Informed his counts are above his parameters and able to proceed with chemo  He remains concerned that this hgb is lower than his last and possibly needing a transfusion  He wants you to review and call him   Informed you are unavailable until tomorrow and he is OK with that   [phone]

## 2018-05-15 DIAGNOSIS — D69.6 AUTOIMMUNE HEMOLYTIC ANEMIA WITH IMMUNE THROMBOCYTOPENIA (HCC): Primary | ICD-10-CM

## 2018-05-15 DIAGNOSIS — D59.10 AUTOIMMUNE HEMOLYTIC ANEMIA WITH IMMUNE THROMBOCYTOPENIA (HCC): Primary | ICD-10-CM

## 2018-05-15 LAB
ANISOCYTOSIS BLD QL SMEAR: PRESENT
BASOPHILS # BLD MANUAL: 0.22 THOUSAND/UL (ref 0–0.1)
BASOPHILS NFR MAR MANUAL: 2 % (ref 0–1)
EOSINOPHIL # BLD MANUAL: 1.98 THOUSAND/UL (ref 0–0.4)
EOSINOPHIL NFR BLD MANUAL: 18 % (ref 0–6)
ERYTHROCYTE [DISTWIDTH] IN BLOOD BY AUTOMATED COUNT: 18.7 % (ref 11.6–15.1)
HCT VFR BLD AUTO: 30 % (ref 36.5–49.3)
HGB BLD-MCNC: 9.3 G/DL (ref 12–17)
HOWELL-JOLLY BOD BLD QL SMEAR: PRESENT
LYMPHOCYTES # BLD AUTO: 38 % (ref 14–44)
LYMPHOCYTES # BLD AUTO: 4.17 THOUSAND/UL (ref 0.6–4.47)
MACROCYTES BLD QL AUTO: PRESENT
MCH RBC QN AUTO: 40.8 PG (ref 26.8–34.3)
MCHC RBC AUTO-ENTMCNC: 31 G/DL (ref 31.4–37.4)
MCV RBC AUTO: 132 FL (ref 82–98)
MONOCYTES # BLD AUTO: 1.1 THOUSAND/UL (ref 0–1.22)
MONOCYTES NFR BLD: 10 % (ref 4–12)
NEUTROPHILS # BLD MANUAL: 3.51 THOUSAND/UL (ref 1.85–7.62)
NEUTS BAND NFR BLD MANUAL: 1 % (ref 0–8)
NEUTS SEG NFR BLD AUTO: 31 % (ref 43–75)
NRBC BLD AUTO-RTO: 11 /100 WBC (ref 0–2)
NRBC BLD AUTO-RTO: 5 /100 WBCS
PATHOLOGIST INTERPRETATION: NORMAL
PATHOLOGY REVIEW: YES
PLATELET # BLD AUTO: 652 THOUSANDS/UL (ref 149–390)
PLATELET BLD QL SMEAR: ABNORMAL
PMV BLD AUTO: 9.5 FL (ref 8.9–12.7)
POLYCHROMASIA BLD QL SMEAR: PRESENT
RBC # BLD AUTO: 2.28 MILLION/UL (ref 3.88–5.62)
RBC MORPH BLD: PRESENT
TOTAL CELLS COUNTED SPEC: 100
WBC # BLD AUTO: 10.98 THOUSAND/UL (ref 4.31–10.16)

## 2018-05-16 ENCOUNTER — APPOINTMENT (OUTPATIENT)
Dept: LAB | Facility: CLINIC | Age: 63
End: 2018-05-16
Payer: COMMERCIAL

## 2018-05-16 DIAGNOSIS — D69.6 AUTOIMMUNE HEMOLYTIC ANEMIA WITH IMMUNE THROMBOCYTOPENIA (HCC): Primary | ICD-10-CM

## 2018-05-16 DIAGNOSIS — D59.10 AUTOIMMUNE HEMOLYTIC ANEMIA WITH IMMUNE THROMBOCYTOPENIA (HCC): Primary | ICD-10-CM

## 2018-05-16 PROCEDURE — 86850 RBC ANTIBODY SCREEN: CPT

## 2018-05-16 PROCEDURE — 86921 COMPATIBILITY TEST INCUBATE: CPT

## 2018-05-16 PROCEDURE — 86901 BLOOD TYPING SEROLOGIC RH(D): CPT

## 2018-05-16 PROCEDURE — 86922 COMPATIBILITY TEST ANTIGLOB: CPT

## 2018-05-16 PROCEDURE — 36415 COLL VENOUS BLD VENIPUNCTURE: CPT

## 2018-05-16 PROCEDURE — 86870 RBC ANTIBODY IDENTIFICATION: CPT

## 2018-05-16 PROCEDURE — 86900 BLOOD TYPING SEROLOGIC ABO: CPT

## 2018-05-16 RX ORDER — SODIUM CHLORIDE 9 MG/ML
20 INJECTION, SOLUTION INTRAVENOUS CONTINUOUS
Status: DISCONTINUED | OUTPATIENT
Start: 2018-05-17 | End: 2018-05-20 | Stop reason: HOSPADM

## 2018-05-16 RX ORDER — ACETAMINOPHEN 325 MG/1
650 TABLET ORAL ONCE
Status: COMPLETED | OUTPATIENT
Start: 2018-05-17 | End: 2018-05-17

## 2018-05-17 ENCOUNTER — HOSPITAL ENCOUNTER (OUTPATIENT)
Dept: INFUSION CENTER | Facility: CLINIC | Age: 63
Discharge: HOME | End: 2018-05-17
Payer: COMMERCIAL

## 2018-05-17 VITALS
HEIGHT: 68 IN | WEIGHT: 191.8 LBS | DIASTOLIC BLOOD PRESSURE: 71 MMHG | BODY MASS INDEX: 29.07 KG/M2 | SYSTOLIC BLOOD PRESSURE: 117 MMHG | RESPIRATION RATE: 18 BRPM | TEMPERATURE: 98.4 F | HEART RATE: 83 BPM

## 2018-05-17 LAB
BLOOD GROUP ANTIBODIES SERPL: NORMAL
BLOOD GROUP ANTIBODIES SERPL: NORMAL

## 2018-05-17 PROCEDURE — 96413 CHEMO IV INFUSION 1 HR: CPT

## 2018-05-17 PROCEDURE — 86905 BLOOD TYPING RBC ANTIGENS: CPT

## 2018-05-17 PROCEDURE — 96417 CHEMO IV INFUS EACH ADDL SEQ: CPT

## 2018-05-17 PROCEDURE — 86860 RBC ANTIBODY ELUTION: CPT

## 2018-05-17 PROCEDURE — 86880 COOMBS TEST DIRECT: CPT

## 2018-05-17 PROCEDURE — 86906 BLD TYPING SEROLOGIC RH PHNT: CPT

## 2018-05-17 PROCEDURE — 86901 BLOOD TYPING SEROLOGIC RH(D): CPT

## 2018-05-17 PROCEDURE — 86945 BLOOD PRODUCT/IRRADIATION: CPT

## 2018-05-17 PROCEDURE — 96367 TX/PROPH/DG ADDL SEQ IV INF: CPT

## 2018-05-17 PROCEDURE — 86900 BLOOD TYPING SEROLOGIC ABO: CPT

## 2018-05-17 PROCEDURE — 86902 BLOOD TYPE ANTIGEN DONOR EA: CPT

## 2018-05-17 PROCEDURE — 96415 CHEMO IV INFUSION ADDL HR: CPT

## 2018-05-17 PROCEDURE — 86870 RBC ANTIBODY IDENTIFICATION: CPT

## 2018-05-17 RX ADMIN — DIPHENHYDRAMINE HYDROCHLORIDE 25 MG: 50 INJECTION, SOLUTION INTRAMUSCULAR; INTRAVENOUS at 08:58

## 2018-05-17 RX ADMIN — CYCLOPHOSPHAMIDE 1000 MG: 1 INJECTION, POWDER, FOR SOLUTION INTRAVENOUS; ORAL at 12:53

## 2018-05-17 RX ADMIN — DEXAMETHASONE SODIUM PHOSPHATE: 10 INJECTION, SOLUTION INTRAMUSCULAR; INTRAVENOUS at 08:42

## 2018-05-17 RX ADMIN — SODIUM CHLORIDE 1000 ML: 0.9 INJECTION, SOLUTION INTRAVENOUS at 12:32

## 2018-05-17 RX ADMIN — RITUXIMAB 746 MG: 10 INJECTION, SOLUTION INTRAVENOUS at 09:38

## 2018-05-17 RX ADMIN — ACETAMINOPHEN 650 MG: 325 TABLET, FILM COATED ORAL at 08:35

## 2018-05-17 RX ADMIN — SODIUM CHLORIDE 20 ML/HR: 0.9 INJECTION, SOLUTION INTRAVENOUS at 08:58

## 2018-05-17 NOTE — PLAN OF CARE
Problem: Potential for Falls  Goal: Patient will remain free of falls  INTERVENTIONS:  - Assess patient frequently for physical needs  -  Identify cognitive and physical deficits and behaviors that affect risk of falls    -  [place] fall precautions as indicated by assessment   - Educate patient/family on patient safety including physical limitations  - Instruct patient to call for assistance with activity based on assessment  - Modify environment to reduce risk of injury  - Consider OT/PT consult to assist with strengthening/mobility   Outcome: Progressing

## 2018-05-17 NOTE — PROGRESS NOTES
Pt arrived to unit without complaint, received cycle 3 Cytoxan and Rituxan as ordered and tolerated well without any adverse reaction  IVF of 1000ml NSS infused over one hour today, as opposed to over 5hours as written on orders  Dr Santos Alexander made aware via [id number] RN and ok given  Pt tolerated IVF well without adverse reaction  Pt left unit in stable condition, declines AVS today, aware of all future appts including appt tomorrow for blood transfusion

## 2018-05-18 ENCOUNTER — HOSPITAL ENCOUNTER (OUTPATIENT)
Dept: INFUSION CENTER | Facility: CLINIC | Age: 63
Discharge: HOME | End: 2018-05-18
Payer: COMMERCIAL

## 2018-05-18 VITALS
SYSTOLIC BLOOD PRESSURE: 164 MMHG | DIASTOLIC BLOOD PRESSURE: 91 MMHG | TEMPERATURE: 97.2 F | HEART RATE: 72 BPM | RESPIRATION RATE: 16 BRPM

## 2018-05-18 PROCEDURE — P9016 RBC LEUKOCYTES REDUCED: HCPCS

## 2018-05-18 PROCEDURE — 36430 TRANSFUSION BLD/BLD COMPNT: CPT

## 2018-05-29 ENCOUNTER — OFFICE VISIT (OUTPATIENT)
Dept: HEMATOLOGY ONCOLOGY | Facility: CLINIC | Age: 63
End: 2018-05-29
Payer: COMMERCIAL

## 2018-05-29 VITALS
HEART RATE: 90 BPM | DIASTOLIC BLOOD PRESSURE: 72 MMHG | TEMPERATURE: 98.7 F | WEIGHT: 190.5 LBS | RESPIRATION RATE: 17 BRPM | HEIGHT: 68 IN | BODY MASS INDEX: 28.87 KG/M2 | OXYGEN SATURATION: 98 % | SYSTOLIC BLOOD PRESSURE: 138 MMHG

## 2018-05-29 DIAGNOSIS — D59.11 HEMOLYTIC ANEMIA DUE TO WARM ANTIBODY (HCC): Primary | ICD-10-CM

## 2018-05-29 PROCEDURE — 99213 OFFICE O/P EST LOW 20 MIN: CPT | Performed by: INTERNAL MEDICINE

## 2018-05-29 NOTE — LETTER
May 29, 2018     Gorge Hui, [address] [facility],2[address] Floor  [facility]    Patient: Robin Archer   YOB: 1955   Date of Visit: 5/29/2018       Dear Dr Donna Thomas: Thank you for referring Vicente Martin to me for evaluation  Below are my notes for this consultation  If you have questions, please do not hesitate to call me  I look forward to following your patient along with you  Sincerely,        Seth Plata DO        CC: No Recipients  Seth Plata DO  5/29/2018  8:06 AM  Sign at close encounter  Robin Archer  1955  [place]  [address]  [phone]    Chief Complaint   Patient presents with    Follow-up            No history exists  History of Present Illness:  -Ynes Meier DO:    -Interval History:  Patient had 1 transfusion after his last chemotherapy 2 weeks ago  Review of Systems:  Review of Systems   Constitutional: Negative for chills and fever  HENT: Negative for nosebleeds  Eyes: Negative for discharge  Respiratory: Negative for cough and shortness of breath  Cardiovascular: Negative for chest pain  Gastrointestinal: Negative for abdominal pain, constipation and diarrhea  Endocrine: Negative for polydipsia  Genitourinary: Negative for hematuria  Musculoskeletal: Negative for arthralgias  Skin: Negative for color change  Allergic/Immunologic: Negative for immunocompromised state  Neurological: Negative for dizziness and headaches  Hematological: Negative for adenopathy  Psychiatric/Behavioral: Negative for agitation         Patient Active Problem List   Diagnosis    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)    Hyperbilirubinemia    Symptomatic anemia    Acute pulmonary embolism (HCC)    Portal vein thrombosis    Elevated blood pressure reading without diagnosis of hypertension    GERD (gastroesophageal reflux disease) Past Medical History:   Diagnosis Date    Autoimmune hemolytic anemia (HCC)     DVT (deep venous thrombosis) (HCC)     GERD (gastroesophageal reflux disease)     Hemolytic anemia (HCC)     Palpitation     Portal vein thrombosis     Pulmonary emboli (HCC)     Tobacco abuse      Past Surgical History:   Procedure Laterality Date    KNEE SURGERY Right     MT LAP,CHOLECYSTECTOMY/GRAPH N/A 12/23/2017    Procedure: CHOLECYSTECTOMY LAPAROSCOPIC with cholangiogram;  Surgeon: Natali Orr MD;  [facility];  Service: General    MT REMOVAL SPLEEN, TOTAL N/A 5/18/2017    Procedure: LAPAROSCOPIC HAND ASSIST SPLENECTOMY;  Surgeon: Hyacinth Curiel MD;  Location:  MAIN OR;  Service: Surgical Oncology    SHOULDER SURGERY Left      Family History   Problem Relation Age of Onset    No Known Problems Mother     No Known Problems Father      Social History     Social History    Marital status: /Civil Union     Spouse name: N/A    Number of children: N/A    Years of education: N/A     Occupational History    Not on file       Social History Main Topics    Smoking status: Light Tobacco Smoker     Types: Cigars    Smokeless tobacco: Current User      Comment: socially    Alcohol use 3 6 oz/week     6 Cans of beer per week      Comment: social    Drug use: No    Sexual activity: Not on file     Other Topics Concern    Not on file     Social History Narrative    No narrative on file       Current Outpatient Prescriptions:     aspirin 81 MG tablet, Take 81 mg by mouth daily, Disp: , Rfl:     cyanocobalamin (VITAMIN B-12) 1,000 mcg tablet, Take 1,000 mcg by mouth daily, Disp: , Rfl:     ergocalciferol (VITAMIN D2) 50,000 units, Take 50,000 Units by mouth once a week  , Disp: , Rfl:     Multiple Vitamins-Minerals (ONE DAILY MENS) TABS, Take by mouth, Disp: , Rfl:     pantoprazole (PROTONIX) 40 mg tablet, Take 1 tablet by mouth daily in the early morning, Disp: 30 tablet, Rfl: 0   predniSONE 20 mg tablet, Take 1 tablet by mouth daily (Patient taking differently: Take 10 mg by mouth every other day  ), Disp: 60 tablet, Rfl: 0    rivaroxaban (XARELTO) 20 mg tablet, Take 1 tablet (20 mg total) by mouth daily with breakfast, Disp: 30 tablet, Rfl: 1    influenza inactivated quadrivalent vaccine (FLULAVAL) 0 5 ML ANALILIA, Inject 0 5 mL into the shoulder, thigh, or buttocks prior to discharge (preventative) for up to 1 dose, Disp: 1 Syringe, Rfl: 0  Allergies   Allergen Reactions    Iodinated Diagnostic Agents Hives     Vitals:    05/29/18 0726   BP: 138/72   Pulse: 90   Resp: 17   Temp: 98 7 °F (37 1 °C)   SpO2: 98%         Physical Exam   Constitutional: He is oriented to person, place, and time  He appears well-developed  HENT:   Head: Normocephalic  Eyes: Pupils are equal, round, and reactive to light  Neck: Neck supple  Cardiovascular: Normal rate and regular rhythm  No murmur heard  Pulmonary/Chest: Breath sounds normal  He has no wheezes  He has no rales  Abdominal: Soft  There is no tenderness  Musculoskeletal: Normal range of motion  He exhibits no edema or tenderness  Lymphadenopathy:     He has no cervical adenopathy  Neurological: He is alert and oriented to person, place, and time  He has normal reflexes  No cranial nerve deficit  Skin: No rash noted  No erythema  Psychiatric: He has a normal mood and affect  His behavior is normal            Performance Status: ECOG/Zubrod/WHO: 1 - Symptomatic but completely ambulatory    Labs:  CBC, Coags, BMP, Mg, Phos     Imaging  No results found  I reviewed the above laboratory and imaging data  Discussion/Summary:  In summary, this is a 28-year-old male history of autoimmune hemolytic anemia  Clinically he is doing well  Generally he feels good  His energy level is good  He is able to carry out regular activities without restriction    He needed a transfusion the day after his chemotherapy, probably related to ongoing hemolysis rather than chemotherapy toxicity  Bilirubin is elevated supporting ongoing hemolysis  He has mild scleral icterus as well  He notes that when he drinks beer his urine becomes clear  Similar volume of other liquids does not produce the same result  I do not believe this is an actionable observation  I reviewed the above with the patient  He voiced understanding and agreement

## 2018-05-29 NOTE — PROGRESS NOTES
James Zayas  1955  [address] [facility] [address] [address] [phone]    Chief Complaint   Patient presents with    Follow-up            No history exists  History of Present Illness:  -Rainer Byers, DO:    -Interval History:  Patient had 1 transfusion after his last chemotherapy 2 weeks ago  Review of Systems:  Review of Systems   Constitutional: Negative for chills and fever  HENT: Negative for nosebleeds  Eyes: Negative for discharge  Respiratory: Negative for cough and shortness of breath  Cardiovascular: Negative for chest pain  Gastrointestinal: Negative for abdominal pain, constipation and diarrhea  Endocrine: Negative for polydipsia  Genitourinary: Negative for hematuria  Musculoskeletal: Negative for arthralgias  Skin: Negative for color change  Allergic/Immunologic: Negative for immunocompromised state  Neurological: Negative for dizziness and headaches  Hematological: Negative for adenopathy  Psychiatric/Behavioral: Negative for agitation         Patient Active Problem List   Diagnosis    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)    Hyperbilirubinemia    Symptomatic anemia    Acute pulmonary embolism (HCC)    Portal vein thrombosis    Elevated blood pressure reading without diagnosis of hypertension    GERD (gastroesophageal reflux disease)     Past Medical History:   Diagnosis Date    Autoimmune hemolytic anemia (HCC)     DVT (deep venous thrombosis) (HCC)     GERD (gastroesophageal reflux disease)     Hemolytic anemia (HCC)     Palpitation     Portal vein thrombosis     Pulmonary emboli ([address] )     Tobacco abuse      Past Surgical History:   Procedure Laterality Date    KNEE SURGERY Right     [place] LAP,CHOLECYSTECTOMY/GRAPH N/A 12/23/2017    Procedure: CHOLECYSTECTOMY LAPAROSCOPIC with cholangiogram;  Surgeon: Karla Humphrey MD;  [facility]; Service: General    FL REMOVAL SPLEEN, TOTAL N/A 5/18/2017    Procedure: LAPAROSCOPIC HAND ASSIST SPLENECTOMY;  Surgeon: El Mckeon MD;  [facility];  Service: Surgical Oncology    SHOULDER SURGERY Left      Family History   Problem Relation Age of Onset    No Known Problems Mother     No Known Problems Father      Social History     Social History    Marital status: /Civil Union     Spouse name: N/A    Number of children: N/A    Years of education: N/A     Occupational History    Not on file       Social History Main Topics    Smoking status: Light Tobacco Smoker     Types: Cigars    Smokeless tobacco: Current User      Comment: socially    Alcohol use 3 6 oz/week     6 Cans of beer per week      Comment: social    Drug use: No    Sexual activity: Not on file     Other Topics Concern    Not on file     Social History Narrative    No narrative on file       Current Outpatient Prescriptions:     aspirin 81 MG tablet, Take 81 mg by mouth daily, Disp: , Rfl:     cyanocobalamin (VITAMIN B-12) 1,000 mcg tablet, Take 1,000 mcg by mouth daily, Disp: , Rfl:     ergocalciferol (VITAMIN D2) 50,000 units, Take 50,000 Units by mouth once a week  , Disp: , Rfl:     Multiple Vitamins-Minerals (ONE DAILY MENS) TABS, Take by mouth, Disp: , Rfl:     pantoprazole (PROTONIX) 40 mg tablet, Take 1 tablet by mouth daily in the early morning, Disp: 30 tablet, Rfl: 0    predniSONE 20 mg tablet, Take 1 tablet by mouth daily (Patient taking differently: Take 10 mg by mouth every other day  ), Disp: 60 tablet, Rfl: 0    rivaroxaban (XARELTO) 20 mg tablet, Take 1 tablet (20 mg total) by mouth daily with breakfast, Disp: 30 tablet, Rfl: 1    influenza inactivated quadrivalent vaccine (FLULAVAL) 0 5 ML ANALILIA, Inject 0 5 mL into the shoulder, thigh, or buttocks prior to discharge (preventative) for up to 1 dose, Disp: 1 Syringe, Rfl: 0  Allergies   Allergen Reactions    Iodinated Diagnostic Agents Hives Vitals:    05/29/18 0726   BP: 138/72   Pulse: 90   Resp: 17   Temp: 98 7 °F (37 1 °C)   SpO2: 98%         Physical Exam   Constitutional: He is oriented to person, place, and time  He appears well-developed  HENT:   Head: Normocephalic  Eyes: Pupils are equal, round, and reactive to light  Neck: Neck supple  Cardiovascular: Normal rate and regular rhythm  No murmur heard  Pulmonary/Chest: Breath sounds normal  He has no wheezes  He has no rales  Abdominal: Soft  There is no tenderness  Musculoskeletal: Normal range of motion  He exhibits no edema or tenderness  Lymphadenopathy:     He has no cervical adenopathy  Neurological: He is alert and oriented to person, place, and time  He has normal reflexes  No cranial nerve deficit  Skin: No rash noted  No erythema  Psychiatric: He has a normal mood and affect  His behavior is normal            Performance Status: ECOG/Zubrod/WHO: 1 - Symptomatic but completely ambulatory    Labs:  CBC, Coags, BMP, Mg, Phos     Imaging  No results found  I reviewed the above laboratory and imaging data  Discussion/Summary:  In summary, this is a 57-year-old male history of autoimmune hemolytic anemia  Clinically he is doing well  Generally he feels good  His energy level is good  He is able to carry out regular activities without restriction  He needed a transfusion the day after his chemotherapy, probably related to ongoing hemolysis rather than chemotherapy toxicity  Bilirubin is elevated supporting ongoing hemolysis  He has mild scleral icterus as well  He notes that when he drinks beer his urine becomes clear  Similar volume of other liquids does not produce the same result  I do not believe this is an actionable observation  I reviewed the above with the patient  He voiced understanding and agreement

## 2018-06-01 ENCOUNTER — TELEPHONE (OUTPATIENT)
Dept: HEMATOLOGY ONCOLOGY | Facility: CLINIC | Age: 63
End: 2018-06-01

## 2018-06-01 ENCOUNTER — APPOINTMENT (OUTPATIENT)
Dept: LAB | Facility: MEDICAL CENTER | Age: 63
End: 2018-06-01
Payer: COMMERCIAL

## 2018-06-01 DIAGNOSIS — D59.10 ANEMIA, AUTOIMMUNE HEMOLYTIC (HCC): ICD-10-CM

## 2018-06-01 LAB
ALBUMIN SERPL BCP-MCNC: 4.2 G/DL (ref 3.5–5)
ALP SERPL-CCNC: 93 U/L (ref 46–116)
ALT SERPL W P-5'-P-CCNC: 68 U/L (ref 12–78)
ANION GAP SERPL CALCULATED.3IONS-SCNC: 7 MMOL/L (ref 4–13)
ANISOCYTOSIS BLD QL SMEAR: PRESENT
AST SERPL W P-5'-P-CCNC: 66 U/L (ref 5–45)
BASO STIPL BLD QL SMEAR: PRESENT
BASOPHILS # BLD MANUAL: 0.14 THOUSAND/UL (ref 0–0.1)
BASOPHILS NFR MAR MANUAL: 1 % (ref 0–1)
BILIRUB SERPL-MCNC: 4.75 MG/DL (ref 0.2–1)
BUN SERPL-MCNC: 17 MG/DL (ref 5–25)
CALCIUM SERPL-MCNC: 9 MG/DL (ref 8.3–10.1)
CHLORIDE SERPL-SCNC: 109 MMOL/L (ref 100–108)
CO2 SERPL-SCNC: 26 MMOL/L (ref 21–32)
CREAT SERPL-MCNC: 1.01 MG/DL (ref 0.6–1.3)
EOSINOPHIL # BLD MANUAL: 1.52 THOUSAND/UL (ref 0–0.4)
EOSINOPHIL NFR BLD MANUAL: 11 % (ref 0–6)
ERYTHROCYTE [DISTWIDTH] IN BLOOD BY AUTOMATED COUNT: 23.1 % (ref 11.6–15.1)
GFR SERPL CREATININE-BSD FRML MDRD: 78 ML/MIN/1.73SQ M
GLUCOSE P FAST SERPL-MCNC: 131 MG/DL (ref 65–99)
HCT VFR BLD AUTO: 23.2 % (ref 36.5–49.3)
HGB BLD-MCNC: 8.6 G/DL (ref 12–17)
HOWELL-JOLLY BOD BLD QL SMEAR: PRESENT
LYMPHOCYTES # BLD AUTO: 34 % (ref 14–44)
LYMPHOCYTES # BLD AUTO: 4.71 THOUSAND/UL (ref 0.6–4.47)
MACROCYTES BLD QL AUTO: PRESENT
MCH RBC QN AUTO: 56.2 PG (ref 26.8–34.3)
MCHC RBC AUTO-ENTMCNC: 37.1 G/DL (ref 31.4–37.4)
MCV RBC AUTO: 152 FL (ref 82–98)
METAMYELOCYTES NFR BLD MANUAL: 1 % (ref 0–1)
MICROCYTES BLD QL AUTO: PRESENT
MONOCYTES # BLD AUTO: 1.25 THOUSAND/UL (ref 0–1.22)
MONOCYTES NFR BLD: 9 % (ref 4–12)
NEUTROPHILS # BLD MANUAL: 6.09 THOUSAND/UL (ref 1.85–7.62)
NEUTS SEG NFR BLD AUTO: 44 % (ref 43–75)
NRBC BLD AUTO-RTO: 14 /100 WBCS
NRBC BLD AUTO-RTO: 19 /100 WBC (ref 0–2)
PLATELET # BLD AUTO: 640 THOUSANDS/UL (ref 149–390)
PLATELET BLD QL SMEAR: ABNORMAL
PMV BLD AUTO: 9.9 FL (ref 8.9–12.7)
POLYCHROMASIA BLD QL SMEAR: PRESENT
POTASSIUM SERPL-SCNC: 3.5 MMOL/L (ref 3.5–5.3)
PROT SERPL-MCNC: 6.6 G/DL (ref 6.4–8.2)
RBC # BLD AUTO: 1.53 MILLION/UL (ref 3.88–5.62)
RBC MORPH BLD: PRESENT
SODIUM SERPL-SCNC: 142 MMOL/L (ref 136–145)
WBC # BLD AUTO: 13.85 THOUSAND/UL (ref 4.31–10.16)

## 2018-06-01 PROCEDURE — 80053 COMPREHEN METABOLIC PANEL: CPT

## 2018-06-01 PROCEDURE — 85007 BL SMEAR W/DIFF WBC COUNT: CPT

## 2018-06-01 PROCEDURE — 36415 COLL VENOUS BLD VENIPUNCTURE: CPT

## 2018-06-01 PROCEDURE — 85027 COMPLETE CBC AUTOMATED: CPT

## 2018-06-01 NOTE — TELEPHONE ENCOUNTER
Again, call to find out Hbg level  It is 8 6  He reports fatigue  Requests Crystal to call him Monday morning if he needs blood before next treatment  Instructed to go to the hospital over the weekend if he becomes more symptomatic

## 2018-06-01 NOTE — TELEPHONE ENCOUNTER
Calling for lab results  Told him the CMP is resulted, but the CBC is still pending  He really wants to know his Hgb  He will call back again

## 2018-06-04 DIAGNOSIS — D59.4 OTHER NON-AUTOIMMUNE HEMOLYTIC ANEMIAS (HCC): Primary | ICD-10-CM

## 2018-06-04 NOTE — TELEPHONE ENCOUNTER
Patient arranged for 2 units PRBC on Friday 6/8 per patient request   He will get chemo Thursday 6/7 - type and screen will be done 72 hours prior  Reviewed that if his symptoms get worse and SOB increases he is to go to the ER    He denies extreme fatigue or dizziness

## 2018-06-05 ENCOUNTER — APPOINTMENT (OUTPATIENT)
Dept: LAB | Facility: CLINIC | Age: 63
End: 2018-06-05
Payer: COMMERCIAL

## 2018-06-05 PROCEDURE — 86922 COMPATIBILITY TEST ANTIGLOB: CPT

## 2018-06-05 PROCEDURE — 86900 BLOOD TYPING SEROLOGIC ABO: CPT

## 2018-06-05 PROCEDURE — 86870 RBC ANTIBODY IDENTIFICATION: CPT | Performed by: INTERNAL MEDICINE

## 2018-06-05 PROCEDURE — 86921 COMPATIBILITY TEST INCUBATE: CPT

## 2018-06-05 PROCEDURE — 86850 RBC ANTIBODY SCREEN: CPT

## 2018-06-05 PROCEDURE — 86901 BLOOD TYPING SEROLOGIC RH(D): CPT

## 2018-06-05 PROCEDURE — 36415 COLL VENOUS BLD VENIPUNCTURE: CPT

## 2018-06-06 RX ORDER — ACETAMINOPHEN 325 MG/1
650 TABLET ORAL ONCE
Status: COMPLETED | OUTPATIENT
Start: 2018-06-07 | End: 2018-06-07

## 2018-06-06 RX ORDER — SODIUM CHLORIDE 9 MG/ML
20 INJECTION, SOLUTION INTRAVENOUS CONTINUOUS
Status: DISCONTINUED | OUTPATIENT
Start: 2018-06-07 | End: 2018-06-10 | Stop reason: HOSPADM

## 2018-06-07 ENCOUNTER — HOSPITAL ENCOUNTER (OUTPATIENT)
Dept: INFUSION CENTER | Facility: CLINIC | Age: 63
Discharge: HOME | End: 2018-06-07
Payer: COMMERCIAL

## 2018-06-07 VITALS
RESPIRATION RATE: 18 BRPM | TEMPERATURE: 97.5 F | HEART RATE: 72 BPM | DIASTOLIC BLOOD PRESSURE: 84 MMHG | HEIGHT: 68 IN | OXYGEN SATURATION: 99 % | WEIGHT: 189.6 LBS | SYSTOLIC BLOOD PRESSURE: 155 MMHG | BODY MASS INDEX: 28.73 KG/M2

## 2018-06-07 LAB
BLOOD GROUP ANTIBODIES SERPL: NORMAL
BLOOD GROUP ANTIBODIES SERPL: NORMAL

## 2018-06-07 PROCEDURE — 96413 CHEMO IV INFUSION 1 HR: CPT

## 2018-06-07 PROCEDURE — 86905 BLOOD TYPING RBC ANTIGENS: CPT

## 2018-06-07 PROCEDURE — 86860 RBC ANTIBODY ELUTION: CPT

## 2018-06-07 PROCEDURE — 86880 COOMBS TEST DIRECT: CPT

## 2018-06-07 PROCEDURE — 86902 BLOOD TYPE ANTIGEN DONOR EA: CPT

## 2018-06-07 PROCEDURE — 86870 RBC ANTIBODY IDENTIFICATION: CPT

## 2018-06-07 PROCEDURE — 96415 CHEMO IV INFUSION ADDL HR: CPT

## 2018-06-07 PROCEDURE — 86945 BLOOD PRODUCT/IRRADIATION: CPT

## 2018-06-07 PROCEDURE — 86906 BLD TYPING SEROLOGIC RH PHNT: CPT

## 2018-06-07 PROCEDURE — 86901 BLOOD TYPING SEROLOGIC RH(D): CPT

## 2018-06-07 PROCEDURE — 86900 BLOOD TYPING SEROLOGIC ABO: CPT

## 2018-06-07 PROCEDURE — 96417 CHEMO IV INFUS EACH ADDL SEQ: CPT

## 2018-06-07 PROCEDURE — 96367 TX/PROPH/DG ADDL SEQ IV INF: CPT

## 2018-06-07 RX ORDER — SODIUM CHLORIDE 9 MG/ML
20 INJECTION, SOLUTION INTRAVENOUS CONTINUOUS
Status: DISCONTINUED | OUTPATIENT
Start: 2018-06-08 | End: 2018-06-11 | Stop reason: HOSPADM

## 2018-06-07 RX ADMIN — RITUXIMAB 750 MG: 10 INJECTION, SOLUTION INTRAVENOUS at 09:18

## 2018-06-07 RX ADMIN — SODIUM CHLORIDE 1000 ML: 0.9 INJECTION, SOLUTION INTRAVENOUS at 08:31

## 2018-06-07 RX ADMIN — ACETAMINOPHEN 650 MG: 325 TABLET, FILM COATED ORAL at 08:24

## 2018-06-07 RX ADMIN — DEXAMETHASONE SODIUM PHOSPHATE: 10 INJECTION, SOLUTION INTRAMUSCULAR; INTRAVENOUS at 08:30

## 2018-06-07 RX ADMIN — SODIUM CHLORIDE 20 ML/HR: 0.9 INJECTION, SOLUTION INTRAVENOUS at 08:30

## 2018-06-07 RX ADMIN — CYCLOPHOSPHAMIDE 1000 MG: 1 INJECTION, POWDER, FOR SOLUTION INTRAVENOUS; ORAL at 12:04

## 2018-06-07 RX ADMIN — DIPHENHYDRAMINE HYDROCHLORIDE 25 MG: 50 INJECTION, SOLUTION INTRAMUSCULAR; INTRAVENOUS at 08:57

## 2018-06-07 NOTE — PROGRESS NOTES
Pt arrived to unit without complaint, received cycle 4 Rituxan and Cytoxan as well as all associated premeds and IV hydration as ordered  Pt  tolerated well without adverse reaction, left unit in stable condition without question or concern, pt declines AVS today  Pt aware of need to return tomorrow for blood transfusion

## 2018-06-08 ENCOUNTER — HOSPITAL ENCOUNTER (OUTPATIENT)
Dept: INFUSION CENTER | Facility: CLINIC | Age: 63
Discharge: HOME | End: 2018-06-08
Payer: COMMERCIAL

## 2018-06-08 VITALS
DIASTOLIC BLOOD PRESSURE: 80 MMHG | RESPIRATION RATE: 20 BRPM | HEART RATE: 87 BPM | TEMPERATURE: 98.9 F | OXYGEN SATURATION: 100 % | SYSTOLIC BLOOD PRESSURE: 146 MMHG

## 2018-06-08 PROCEDURE — P9016 RBC LEUKOCYTES REDUCED: HCPCS

## 2018-06-08 PROCEDURE — 36430 TRANSFUSION BLD/BLD COMPNT: CPT

## 2018-06-08 RX ADMIN — SODIUM CHLORIDE 20 ML/HR: 9 INJECTION, SOLUTION INTRAVENOUS at 09:39

## 2018-06-08 NOTE — PLAN OF CARE
Problem: Potential for Falls  Goal: Patient will remain free of falls  INTERVENTIONS:  - Assess patient frequently for physical needs  -  Identify cognitive and physical deficits and behaviors that affect risk of falls    -  [place] fall precautions as indicated by assessment   - Educate patient/family on patient safety including physical limitations  - Instruct patient to call for assistance with activity based on assessment  - Modify environment to reduce risk of injury  - Consider OT/PT consult to assist with strengthening/mobility   Outcome: Progressing

## 2018-06-21 ENCOUNTER — TELEPHONE (OUTPATIENT)
Dept: HEMATOLOGY ONCOLOGY | Facility: CLINIC | Age: 63
End: 2018-06-21

## 2018-06-21 NOTE — TELEPHONE ENCOUNTER
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

## 2018-06-26 ENCOUNTER — TRANSCRIBE ORDERS (OUTPATIENT)
Dept: ADMINISTRATIVE | Facility: HOSPITAL | Age: 63
End: 2018-06-26

## 2018-06-26 ENCOUNTER — APPOINTMENT (OUTPATIENT)
Dept: LAB | Facility: MEDICAL CENTER | Age: 63
End: 2018-06-26
Payer: COMMERCIAL

## 2018-06-26 ENCOUNTER — TELEPHONE (OUTPATIENT)
Dept: HEMATOLOGY ONCOLOGY | Facility: CLINIC | Age: 63
End: 2018-06-26

## 2018-06-26 DIAGNOSIS — D59.10 ANEMIA, AUTOIMMUNE HEMOLYTIC (HCC): ICD-10-CM

## 2018-06-26 LAB
ALBUMIN SERPL BCP-MCNC: 4.1 G/DL (ref 3.5–5)
ALP SERPL-CCNC: 90 U/L (ref 46–116)
ALT SERPL W P-5'-P-CCNC: 86 U/L (ref 12–78)
ANION GAP SERPL CALCULATED.3IONS-SCNC: 5 MMOL/L (ref 4–13)
AST SERPL W P-5'-P-CCNC: 81 U/L (ref 5–45)
BASOPHILS # BLD AUTO: 0.17 THOUSANDS/ΜL (ref 0–0.1)
BASOPHILS NFR BLD AUTO: 2 % (ref 0–1)
BILIRUB SERPL-MCNC: 6.73 MG/DL (ref 0.2–1)
BUN SERPL-MCNC: 16 MG/DL (ref 5–25)
CALCIUM SERPL-MCNC: 9.2 MG/DL (ref 8.3–10.1)
CHLORIDE SERPL-SCNC: 109 MMOL/L (ref 100–108)
CO2 SERPL-SCNC: 28 MMOL/L (ref 21–32)
CREAT SERPL-MCNC: 0.83 MG/DL (ref 0.6–1.3)
EOSINOPHIL # BLD AUTO: 1.07 THOUSAND/ΜL (ref 0–0.61)
EOSINOPHIL NFR BLD AUTO: 11 % (ref 0–6)
GFR SERPL CREATININE-BSD FRML MDRD: 93 ML/MIN/1.73SQ M
GLUCOSE P FAST SERPL-MCNC: 110 MG/DL (ref 65–99)
HCT VFR BLD AUTO: 25 % (ref 36.5–49.3)
HGB BLD-MCNC: 9.1 G/DL (ref 12–17)
IMM GRANULOCYTES # BLD AUTO: 0.05 THOUSAND/UL (ref 0–0.2)
IMM GRANULOCYTES NFR BLD AUTO: 1 % (ref 0–2)
LYMPHOCYTES # BLD AUTO: 2.88 THOUSANDS/ΜL (ref 0.6–4.47)
LYMPHOCYTES NFR BLD AUTO: 31 % (ref 14–44)
MCH RBC QN AUTO: 51.4 PG (ref 26.8–34.3)
MCHC RBC AUTO-ENTMCNC: 36.4 G/DL (ref 31.4–37.4)
MCV RBC AUTO: 141 FL (ref 82–98)
MONOCYTES # BLD AUTO: 1.22 THOUSAND/ΜL (ref 0.17–1.22)
MONOCYTES NFR BLD AUTO: 13 % (ref 4–12)
NEUTROPHILS # BLD AUTO: 4.02 THOUSANDS/ΜL (ref 1.85–7.62)
NEUTS SEG NFR BLD AUTO: 42 % (ref 43–75)
NRBC BLD AUTO-RTO: 5 /100 WBCS
PLATELET # BLD AUTO: 548 THOUSANDS/UL (ref 149–390)
PMV BLD AUTO: 10.4 FL (ref 8.9–12.7)
POTASSIUM SERPL-SCNC: 3.6 MMOL/L (ref 3.5–5.3)
PROT SERPL-MCNC: 6.6 G/DL (ref 6.4–8.2)
RBC # BLD AUTO: 1.77 MILLION/UL (ref 3.88–5.62)
SODIUM SERPL-SCNC: 142 MMOL/L (ref 136–145)
WBC # BLD AUTO: 9.41 THOUSAND/UL (ref 4.31–10.16)

## 2018-06-26 PROCEDURE — 85025 COMPLETE CBC W/AUTO DIFF WBC: CPT

## 2018-06-26 PROCEDURE — 36415 COLL VENOUS BLD VENIPUNCTURE: CPT

## 2018-06-26 PROCEDURE — 80053 COMPREHEN METABOLIC PANEL: CPT

## 2018-06-26 NOTE — TELEPHONE ENCOUNTER
Calling for CBC results  Epic still says "in process"  Please call when available  He would really like to know today

## 2018-06-27 ENCOUNTER — OFFICE VISIT (OUTPATIENT)
Dept: HEMATOLOGY ONCOLOGY | Facility: CLINIC | Age: 63
End: 2018-06-27
Payer: COMMERCIAL

## 2018-06-27 VITALS
OXYGEN SATURATION: 96 % | SYSTOLIC BLOOD PRESSURE: 134 MMHG | WEIGHT: 188.5 LBS | DIASTOLIC BLOOD PRESSURE: 72 MMHG | BODY MASS INDEX: 28.57 KG/M2 | HEART RATE: 72 BPM | TEMPERATURE: 98.1 F | RESPIRATION RATE: 17 BRPM | HEIGHT: 68 IN

## 2018-06-27 DIAGNOSIS — I26.99 OTHER ACUTE PULMONARY EMBOLISM WITHOUT ACUTE COR PULMONALE (HCC): Primary | ICD-10-CM

## 2018-06-27 PROCEDURE — 99215 OFFICE O/P EST HI 40 MIN: CPT | Performed by: INTERNAL MEDICINE

## 2018-06-27 RX ORDER — DABIGATRAN ETEXILATE 150 MG/1
150 CAPSULE, COATED PELLETS ORAL 2 TIMES DAILY
Qty: 60 CAPSULE | Refills: 5 | Status: SHIPPED | OUTPATIENT
Start: 2018-06-27 | End: 2019-01-10 | Stop reason: SDUPTHER

## 2018-06-27 NOTE — PROGRESS NOTES
[address] [facility]  [facility] [place]  Donna Sunitha [address],1955, [phone]  06/27/18    Discussion:   In summary, this is a 76-year-old male with history of autoimmune hemolytic anemia  He is currently on Rituxan, Cytoxan, 3 weeks cycle  He continues to have transfusion requirement on approximately Q 2 week basis  Bilirubin is elevated  Hemolysis is ongoing  I conferred with Dr Tony Avina, a transplant or at [facility]  He suggested consideration for referral potent supplementation or CellCept  Additionally, I contacted the  of [employer] regarding any data they have in treatment of patients with autoimmune hemolytic anemia  We will be checking an erythropoietin level to estimate the likelihood of response to this medication  We reviewed potential toxicities of erythropoietin which are generally minimal   He had a DVT and pulmonary embolism about a week ago  [facility]   He was started on Lovenox  He had been on Xarelto at the time this occurred  We reviewed that further anticoagulation is indicated  Lovenox is continued at 1 milligram/kilogram q 12  We reviewed some of the pros and cons of injectable versus other oral medications  Ultimately we elected to move forward with Pradaxa 150 mg p o  q 12 hours  I discussed the above with the patient  The patient and his wife voiced understanding and agreement   ______________________________________________________________________    Chief Complaint   Patient presents with    Follow-up       HPI:   No history exists  Interval History:  Clinically stable  1 - Symptomatic but completely ambulatory    Review of Systems   Constitutional: Negative for chills and fever  HENT: Negative for nosebleeds  Eyes: Negative for discharge  Respiratory: Negative for cough and shortness of breath  Cardiovascular: Negative for chest pain  Gastrointestinal: Negative for abdominal pain, constipation and diarrhea  Endocrine: Negative for polydipsia  Genitourinary: Negative for hematuria  Musculoskeletal: Negative for arthralgias  Skin: Negative for color change  Allergic/Immunologic: Negative for immunocompromised state  Neurological: Negative for dizziness and headaches  Hematological: Negative for adenopathy  Psychiatric/Behavioral: Negative for agitation         Past Medical History:   Diagnosis Date    Autoimmune hemolytic anemia (HCC)     DVT (deep venous thrombosis) (HCC)     GERD (gastroesophageal reflux disease)     Hemolytic anemia (HCC)     Palpitation     Portal vein thrombosis     Pulmonary emboli (HCC)     Tobacco abuse      Patient Active Problem List   Diagnosis    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)    Hyperbilirubinemia    Symptomatic anemia    Acute pulmonary embolism (HCC)    Portal vein thrombosis    Elevated blood pressure reading without diagnosis of hypertension    GERD (gastroesophageal reflux disease)       Current Outpatient Prescriptions:     aspirin 81 MG tablet, Take 81 mg by mouth daily, Disp: , Rfl:     cyanocobalamin (VITAMIN B-12) 1,000 mcg tablet, Take 1,000 mcg by mouth daily, Disp: , Rfl:     enoxaparin (LOVENOX) 100 mg/mL, Inject 90 mg under the skin daily For 30 days starting on 6/23, Disp: , Rfl:     ergocalciferol (VITAMIN D2) 50,000 units, Take 50,000 Units by mouth once a week  , Disp: , Rfl:     Multiple Vitamins-Minerals (ONE DAILY MENS) TABS, Take by mouth, Disp: , Rfl:     pantoprazole (PROTONIX) 40 mg tablet, Take 1 tablet by mouth daily in the early morning, Disp: 30 tablet, Rfl: 0    predniSONE 20 mg tablet, Take 1 tablet by mouth daily (Patient taking differently: Take 10 mg by mouth every other day  ), Disp: 60 tablet, Rfl: 0    dabigatran etexilate (PRADAXA) 150 mg capsu, Take 1 capsule (150 mg total) by mouth 2 (two) times a day, Disp: 60 capsule, Rfl: 5  Allergies   Allergen Reactions    Iodinated Diagnostic Agents Hives     Past Surgical History:   Procedure Laterality Date    KNEE SURGERY Right     OH LAP,CHOLECYSTECTOMY/GRAPH N/A 12/23/2017    Procedure: CHOLECYSTECTOMY LAPAROSCOPIC with cholangiogram;  Surgeon: Praneeth Mattson MD;  [facility];  Service: General    OH REMOVAL SPLEEN, TOTAL N/A 5/18/2017    Procedure: LAPAROSCOPIC HAND ASSIST SPLENECTOMY;  Surgeon: Pradeep Diggs MD;  [facility];  Service: Surgical Oncology    SHOULDER SURGERY Left      Social History     Objective:  Vitals:    06/27/18 1505   BP: 134/72   BP Location: Left arm   Patient Position: Sitting   Pulse: 72   Resp: 17   Temp: 98 1 °F (36 7 °C)   TempSrc: Tympanic   SpO2: 96%   Weight: 85 5 kg (188 lb 8 oz)   Height: 5' 7 8" (1 722 m)     Physical Exam   Constitutional: He is oriented to person, place, and time  He appears well-developed  HENT:   Head: Normocephalic  Eyes: Pupils are equal, round, and reactive to light  Neck: Neck supple  Cardiovascular: Normal rate and regular rhythm  No murmur heard  Pulmonary/Chest: Breath sounds normal  He has no wheezes  He has no rales  Abdominal: Soft  There is no tenderness  Musculoskeletal: Normal range of motion  He exhibits no edema or tenderness  Lymphadenopathy:     He has no cervical adenopathy  Neurological: He is alert and oriented to person, place, and time  He has normal reflexes  No cranial nerve deficit  Skin: No rash noted  No erythema  Psychiatric: He has a normal mood and affect  His behavior is normal          Labs: I personally reviewed the labs and imaging pertinent to this patient care

## 2018-06-27 NOTE — TELEPHONE ENCOUNTER
Spoke with pt he wanted to know the result of his HG, I have him the result and told him Dr Yury King will go over the rest of his lab work at his visit today   6/27

## 2018-06-28 ENCOUNTER — HOSPITAL ENCOUNTER (OUTPATIENT)
Dept: INFUSION CENTER | Facility: CLINIC | Age: 63
Discharge: HOME | End: 2018-06-28
Payer: COMMERCIAL

## 2018-06-28 VITALS
RESPIRATION RATE: 18 BRPM | BODY MASS INDEX: 28.87 KG/M2 | DIASTOLIC BLOOD PRESSURE: 79 MMHG | TEMPERATURE: 98.9 F | HEART RATE: 82 BPM | WEIGHT: 190.48 LBS | SYSTOLIC BLOOD PRESSURE: 159 MMHG | HEIGHT: 68 IN

## 2018-06-28 PROCEDURE — 96413 CHEMO IV INFUSION 1 HR: CPT

## 2018-06-28 PROCEDURE — 82668 ASSAY OF ERYTHROPOIETIN: CPT | Performed by: INTERNAL MEDICINE

## 2018-06-28 PROCEDURE — 96367 TX/PROPH/DG ADDL SEQ IV INF: CPT

## 2018-06-28 PROCEDURE — 96415 CHEMO IV INFUSION ADDL HR: CPT

## 2018-06-28 PROCEDURE — 96417 CHEMO IV INFUS EACH ADDL SEQ: CPT

## 2018-06-28 RX ORDER — SODIUM CHLORIDE 9 MG/ML
200 INJECTION, SOLUTION INTRAVENOUS CONTINUOUS
Status: DISCONTINUED | OUTPATIENT
Start: 2018-06-28 | End: 2018-07-01 | Stop reason: HOSPADM

## 2018-06-28 RX ORDER — ACETAMINOPHEN 325 MG/1
650 TABLET ORAL ONCE
Status: COMPLETED | OUTPATIENT
Start: 2018-06-28 | End: 2018-06-28

## 2018-06-28 RX ADMIN — ACETAMINOPHEN 650 MG: 325 TABLET, FILM COATED ORAL at 08:39

## 2018-06-28 RX ADMIN — SODIUM CHLORIDE 200 ML/HR: 0.9 INJECTION, SOLUTION INTRAVENOUS at 08:40

## 2018-06-28 RX ADMIN — CYCLOPHOSPHAMIDE 1000 MG: 1 INJECTION, POWDER, FOR SOLUTION INTRAVENOUS; ORAL at 12:46

## 2018-06-28 RX ADMIN — DIPHENHYDRAMINE HYDROCHLORIDE 25 MG: 50 INJECTION, SOLUTION INTRAMUSCULAR; INTRAVENOUS at 08:40

## 2018-06-28 RX ADMIN — RITUXIMAB 750 MG: 10 INJECTION, SOLUTION INTRAVENOUS at 09:55

## 2018-06-28 RX ADMIN — DEXAMETHASONE SODIUM PHOSPHATE: 10 INJECTION, SOLUTION INTRAMUSCULAR; INTRAVENOUS at 09:00

## 2018-06-28 NOTE — PROGRESS NOTES
Spoke with YASMIN Ambrocio who states Dr Gala Mitchell is aware of elevated LFT's and bilirubin and also that pt was hospitalized in [place] last week  OK to proceed with treatment today  Labs within parameters for treatment

## 2018-06-28 NOTE — PLAN OF CARE
Problem: Potential for Falls  Goal: Patient will remain free of falls  INTERVENTIONS:  - Assess patient frequently for physical needs  -  Identify cognitive and physical deficits and behaviors that affect risk of falls    -  [place] fall precautions as indicated by assessment   - Educate patient/family on patient safety including physical limitations  - Instruct patient to call for assistance with activity based on assessment  - Modify environment to reduce risk of injury  - Consider OT/PT consult to assist with strengthening/mobility   Outcome: Progressing

## 2018-06-29 ENCOUNTER — TELEPHONE (OUTPATIENT)
Dept: HEMATOLOGY ONCOLOGY | Facility: CLINIC | Age: 63
End: 2018-06-29

## 2018-06-29 DIAGNOSIS — D59.10 AUTOIMMUNE HEMOLYTIC ANEMIA (HCC): Primary | ICD-10-CM

## 2018-06-29 LAB — EPO SERPL-ACNC: 254.4 MIU/ML (ref 2.6–18.5)

## 2018-06-29 NOTE — TELEPHONE ENCOUNTER
Returned call to patient - reviewed that EPO level is well above the normal range  Dr Nitza Omer would like a Retic count next time patient goes for lab work    Patient will have this drawn next week

## 2018-07-05 ENCOUNTER — APPOINTMENT (OUTPATIENT)
Dept: LAB | Facility: MEDICAL CENTER | Age: 63
End: 2018-07-05
Payer: COMMERCIAL

## 2018-07-05 DIAGNOSIS — D59.10 ANEMIA, AUTOIMMUNE HEMOLYTIC (HCC): ICD-10-CM

## 2018-07-05 DIAGNOSIS — D59.10 AUTOIMMUNE HEMOLYTIC ANEMIA (HCC): ICD-10-CM

## 2018-07-05 LAB
ALBUMIN SERPL BCP-MCNC: 4 G/DL (ref 3.5–5)
ALP SERPL-CCNC: 116 U/L (ref 46–116)
ALT SERPL W P-5'-P-CCNC: 101 U/L (ref 12–78)
ANION GAP SERPL CALCULATED.3IONS-SCNC: 10 MMOL/L (ref 4–13)
ANISOCYTOSIS BLD QL SMEAR: PRESENT
AST SERPL W P-5'-P-CCNC: 104 U/L (ref 5–45)
BASOPHILS # BLD MANUAL: 0 THOUSAND/UL (ref 0–0.1)
BASOPHILS NFR MAR MANUAL: 0 % (ref 0–1)
BILIRUB SERPL-MCNC: 5.02 MG/DL (ref 0.2–1)
BUN SERPL-MCNC: 16 MG/DL (ref 5–25)
CALCIUM SERPL-MCNC: 9.2 MG/DL (ref 8.3–10.1)
CHLORIDE SERPL-SCNC: 108 MMOL/L (ref 100–108)
CO2 SERPL-SCNC: 25 MMOL/L (ref 21–32)
CREAT SERPL-MCNC: 0.93 MG/DL (ref 0.6–1.3)
EOSINOPHIL # BLD MANUAL: 0.38 THOUSAND/UL (ref 0–0.4)
EOSINOPHIL NFR BLD MANUAL: 3 % (ref 0–6)
GFR SERPL CREATININE-BSD FRML MDRD: 86 ML/MIN/1.73SQ M
GLUCOSE P FAST SERPL-MCNC: 95 MG/DL (ref 65–99)
HCT VFR BLD AUTO: 17.1 % (ref 36.5–49.3)
HGB BLD-MCNC: 6 G/DL (ref 12–17)
HGB RETIC QN AUTO: 44.7 PG (ref 30–38.3)
IMM RETICS NFR: 49.3 % (ref 0–14)
LYMPHOCYTES # BLD AUTO: 36 % (ref 14–44)
LYMPHOCYTES # BLD AUTO: 4.62 THOUSAND/UL (ref 0.6–4.47)
MACROCYTES BLD QL AUTO: PRESENT
MCH RBC QN AUTO: 57.1 PG (ref 26.8–34.3)
MCHC RBC AUTO-ENTMCNC: 35.1 G/DL (ref 31.4–37.4)
MCV RBC AUTO: 163 FL (ref 82–98)
MICROCYTES BLD QL AUTO: PRESENT
MONOCYTES # BLD AUTO: 1.79 THOUSAND/UL (ref 0–1.22)
MONOCYTES NFR BLD: 14 % (ref 4–12)
NEUTROPHILS # BLD MANUAL: 5.64 THOUSAND/UL (ref 1.85–7.62)
NEUTS SEG NFR BLD AUTO: 44 % (ref 43–75)
NRBC BLD AUTO-RTO: 83 /100 WBCS
PLATELET # BLD AUTO: 565 THOUSANDS/UL (ref 149–390)
PLATELET BLD QL SMEAR: ADEQUATE
PMV BLD AUTO: 10.9 FL (ref 8.9–12.7)
POIKILOCYTOSIS BLD QL SMEAR: PRESENT
POTASSIUM SERPL-SCNC: 3.9 MMOL/L (ref 3.5–5.3)
PROT SERPL-MCNC: 6.7 G/DL (ref 6.4–8.2)
RBC # BLD AUTO: 1.05 MILLION/UL (ref 3.88–5.62)
RBC MORPH BLD: PRESENT
RETICS # AUTO: ABNORMAL 10*3/UL (ref 14356–105094)
RETICS # CALC: >27.41 % (ref 0.37–1.87)
SODIUM SERPL-SCNC: 143 MMOL/L (ref 136–145)
VARIANT LYMPHS # BLD AUTO: 3 %
WBC # BLD AUTO: 12.82 THOUSAND/UL (ref 4.31–10.16)

## 2018-07-05 PROCEDURE — 85027 COMPLETE CBC AUTOMATED: CPT

## 2018-07-05 PROCEDURE — 85007 BL SMEAR W/DIFF WBC COUNT: CPT

## 2018-07-05 PROCEDURE — 80053 COMPREHEN METABOLIC PANEL: CPT

## 2018-07-05 PROCEDURE — 85046 RETICYTE/HGB CONCENTRATE: CPT

## 2018-07-05 PROCEDURE — 36415 COLL VENOUS BLD VENIPUNCTURE: CPT

## 2018-07-06 ENCOUNTER — TELEPHONE (OUTPATIENT)
Dept: HEMATOLOGY ONCOLOGY | Facility: CLINIC | Age: 63
End: 2018-07-06

## 2018-07-09 DIAGNOSIS — R05.8 PRODUCTIVE COUGH: ICD-10-CM

## 2018-07-09 DIAGNOSIS — D59.10 AUTOIMMUNE HEMOLYTIC ANEMIA (HCC): Primary | ICD-10-CM

## 2018-07-09 RX ORDER — AMOXICILLIN AND CLAVULANATE POTASSIUM 875; 125 MG/1; MG/1
1 TABLET, FILM COATED ORAL EVERY 12 HOURS SCHEDULED
Qty: 20 TABLET | Refills: 0 | Status: SHIPPED | OUTPATIENT
Start: 2018-07-09 | End: 2018-07-19

## 2018-07-09 NOTE — TELEPHONE ENCOUNTER
Patient arranged for 2 units PRBC on 7/12/18 for hgb = 6 0  He states he feels well enough to wait for transfusion  He does c/o productive cough with green sputum - feels as though he has a sinus infection  Per Dr Silas Baldwin ok to send Augmentin for patient  He is aware that his Retic level is extremely elevated - this confirms that his anemia is indeed from his condition    He verbalized understanding

## 2018-07-10 ENCOUNTER — APPOINTMENT (OUTPATIENT)
Dept: LAB | Facility: CLINIC | Age: 63
End: 2018-07-10
Payer: COMMERCIAL

## 2018-07-10 ENCOUNTER — TRANSCRIBE ORDERS (OUTPATIENT)
Dept: LAB | Facility: CLINIC | Age: 63
End: 2018-07-10

## 2018-07-10 DIAGNOSIS — D64.9 ANEMIA, UNSPECIFIED TYPE: Primary | ICD-10-CM

## 2018-07-10 DIAGNOSIS — D64.9 ANEMIA, UNSPECIFIED TYPE: ICD-10-CM

## 2018-07-10 PROCEDURE — 86921 COMPATIBILITY TEST INCUBATE: CPT

## 2018-07-10 PROCEDURE — 86850 RBC ANTIBODY SCREEN: CPT

## 2018-07-10 PROCEDURE — 36415 COLL VENOUS BLD VENIPUNCTURE: CPT

## 2018-07-10 PROCEDURE — 86922 COMPATIBILITY TEST ANTIGLOB: CPT

## 2018-07-10 PROCEDURE — 86870 RBC ANTIBODY IDENTIFICATION: CPT

## 2018-07-10 PROCEDURE — 86900 BLOOD TYPING SEROLOGIC ABO: CPT

## 2018-07-10 PROCEDURE — 86901 BLOOD TYPING SEROLOGIC RH(D): CPT

## 2018-07-11 LAB
BLOOD GROUP ANTIBODIES SERPL: NORMAL
BLOOD GROUP ANTIBODIES SERPL: NORMAL

## 2018-07-12 ENCOUNTER — HOSPITAL ENCOUNTER (OUTPATIENT)
Dept: INFUSION CENTER | Facility: CLINIC | Age: 63
Discharge: HOME | End: 2018-07-12
Payer: COMMERCIAL

## 2018-07-12 VITALS
SYSTOLIC BLOOD PRESSURE: 126 MMHG | RESPIRATION RATE: 18 BRPM | TEMPERATURE: 97.8 F | DIASTOLIC BLOOD PRESSURE: 62 MMHG | HEART RATE: 67 BPM

## 2018-07-12 DIAGNOSIS — D59.10 ANEMIA, AUTOIMMUNE HEMOLYTIC (HCC): ICD-10-CM

## 2018-07-12 LAB
ANISOCYTOSIS BLD QL SMEAR: PRESENT
BASOPHILS # BLD AUTO: 0.09 THOUSAND/UL (ref 0–0.1)
BASOPHILS NFR MAR MANUAL: 1 % (ref 0–1)
EOSINOPHIL # BLD AUTO: 0 THOUSAND/UL (ref 0–0.61)
EOSINOPHIL NFR BLD MANUAL: 0 % (ref 0–6)
ERYTHROCYTE [DISTWIDTH] IN BLOOD BY AUTOMATED COUNT: 22.1 % (ref 11.6–15.1)
HCT VFR BLD AUTO: 21.4 % (ref 36.5–49.3)
HGB BLD-MCNC: 6.6 G/DL (ref 12–17)
LYMPHOCYTES # BLD AUTO: 3.53 THOUSAND/UL (ref 0.6–4.47)
LYMPHOCYTES # BLD AUTO: 38 % (ref 14–44)
MACROCYTES BLD QL AUTO: PRESENT
MCH RBC QN AUTO: 43.8 PG (ref 26.8–34.3)
MCHC RBC AUTO-ENTMCNC: 31.1 G/DL (ref 31.4–37.4)
MCV RBC AUTO: 141 FL (ref 82–98)
MONOCYTES # BLD AUTO: 0.46 THOUSAND/UL (ref 0–1.22)
MONOCYTES NFR BLD AUTO: 5 % (ref 4–12)
NEUTS BAND NFR BLD MANUAL: 0 % (ref 0–8)
NEUTS SEG # BLD: 5.2 THOUSAND/UL (ref 1.81–6.82)
NEUTS SEG NFR BLD AUTO: 56 % (ref 43–75)
NRBC BLD AUTO-RTO: 52 /100 WBC (ref 0–2)
PLATELET # BLD AUTO: 558 THOUSANDS/UL (ref 149–390)
PLATELET BLD QL SMEAR: ABNORMAL
PMV BLD AUTO: 8.5 FL (ref 8.9–12.7)
POLYCHROMASIA BLD QL SMEAR: PRESENT
RBC # BLD AUTO: 1.52 MILLION/UL (ref 3.88–5.62)
TOTAL CELLS COUNTED SPEC: 100
WBC # BLD AUTO: 14.1 THOUSAND/UL (ref 4.31–10.16)
WBC NRBC COR # BLD: 14.1 THOUSAND/UL (ref 4.31–10.16)
WBC NRBC COR # BLD: 9.28 THOUSAND/UL (ref 4.31–10.16)

## 2018-07-12 PROCEDURE — 36430 TRANSFUSION BLD/BLD COMPNT: CPT

## 2018-07-12 PROCEDURE — 86945 BLOOD PRODUCT/IRRADIATION: CPT

## 2018-07-12 PROCEDURE — 86902 BLOOD TYPE ANTIGEN DONOR EA: CPT

## 2018-07-12 PROCEDURE — 86901 BLOOD TYPING SEROLOGIC RH(D): CPT

## 2018-07-12 PROCEDURE — 86905 BLOOD TYPING RBC ANTIGENS: CPT

## 2018-07-12 PROCEDURE — P9016 RBC LEUKOCYTES REDUCED: HCPCS

## 2018-07-12 PROCEDURE — 85027 COMPLETE CBC AUTOMATED: CPT

## 2018-07-12 PROCEDURE — 86880 COOMBS TEST DIRECT: CPT

## 2018-07-12 PROCEDURE — 86906 BLD TYPING SEROLOGIC RH PHNT: CPT

## 2018-07-12 PROCEDURE — 86870 RBC ANTIBODY IDENTIFICATION: CPT

## 2018-07-12 PROCEDURE — 85007 BL SMEAR W/DIFF WBC COUNT: CPT

## 2018-07-12 PROCEDURE — 86900 BLOOD TYPING SEROLOGIC ABO: CPT

## 2018-07-12 NOTE — PLAN OF CARE
Problem: Potential for Falls  Goal: Patient will remain free of falls  INTERVENTIONS:  - Assess patient frequently for physical needs  -  Identify cognitive and physical deficits and behaviors that affect risk of falls    -  [place] fall precautions as indicated by assessment   - Educate patient/family on patient safety including physical limitations  - Instruct patient to call for assistance with activity based on assessment  - Modify environment to reduce risk of injury  - Consider OT/PT consult to assist with strengthening/mobility   Outcome: Progressing

## 2018-07-17 ENCOUNTER — OFFICE VISIT (OUTPATIENT)
Dept: HEMATOLOGY ONCOLOGY | Facility: CLINIC | Age: 63
End: 2018-07-17
Payer: COMMERCIAL

## 2018-07-17 ENCOUNTER — TELEPHONE (OUTPATIENT)
Dept: HEMATOLOGY ONCOLOGY | Facility: CLINIC | Age: 63
End: 2018-07-17

## 2018-07-17 VITALS
HEART RATE: 84 BPM | SYSTOLIC BLOOD PRESSURE: 138 MMHG | HEIGHT: 68 IN | TEMPERATURE: 98.2 F | BODY MASS INDEX: 28.01 KG/M2 | DIASTOLIC BLOOD PRESSURE: 74 MMHG | RESPIRATION RATE: 18 BRPM | OXYGEN SATURATION: 96 % | WEIGHT: 184.8 LBS

## 2018-07-17 DIAGNOSIS — D59.19 OTHER AUTOIMMUNE HEMOLYTIC ANEMIAS: Primary | ICD-10-CM

## 2018-07-17 PROCEDURE — 99215 OFFICE O/P EST HI 40 MIN: CPT | Performed by: PHYSICIAN ASSISTANT

## 2018-07-17 RX ORDER — PREDNISONE 10 MG/1
60 TABLET ORAL
Qty: 120 TABLET | Refills: 1 | Status: SHIPPED | OUTPATIENT
Start: 2018-07-17 | End: 2018-08-20 | Stop reason: DRUGHIGH

## 2018-07-17 NOTE — PROGRESS NOTES
[address] [facility] [place]  [address] [address] [phone]  Hematology Ambulatory [place], 1955, [id number]  7/17/2018    Assessment/Plan:    1  Autoimmune hemolytic anemia, acquired  Patient has completed 5 cycles of Rituxan and Cytoxan  Unfortunately, this has not impacted his need for blood transfusions and at this time, along with Dr Yury King, I do not believe the patient has received a benefit from chemotherapy regimen  Therefore, This will be discontinued today  Patient underwent blood transfusion on 7/12/18  Prior to transfusion hemoglobin was 6 6 grams/deciliter  Patient was asked to follow up with blood work on 7/19/18  As the patient is now off of treatment I have advised him to follow up with weekly blood draws  We discussed other treatment options  At this juncture, we would like the patient to follow up with second opinion at the [facility]  In the mean time, we will rechallange with Prednisone starting at 60 mg daily decreasing by 10 mg weekly  The above was discussed with the patient  He is in agreement and is willing to travel  Addendum:  After the patient left additional research demonstrated follow-up be appropriate with Alexandro Medeiros MD   This office will make the patient's appointment and notify him  2   Recurrent DVT and pulmonary embolism  Patient had DVT/PE on Xarelto and was transitioned to Pradaxa 150 mg twice a day  Labs and imaging for follow up:  Orders Placed This Encounter   Procedures    CBC and differential     This is a patient instruction: This test is non-fasting  Please drink two glasses of water morning of bloodwork  Standing Status:   Standing     Number of Occurrences:   20     Standing Expiration Date:   7/17/2019     The patient is scheduled for follow-up in approximately 3 weeks with Dr Yury King  Patient voiced agreement and understanding to the above  Patient knows to call the Hematology/Oncology office with any questions and concerns regarding the above  Carefully review your medication list in verify the list is accurate and up-to-date  Please call the hematologic/oncology office if there medications missing from the less, medications on the list that your not currently taking or if there is a dosage or instruction that is different from higher taking medication  I have spent 25 minutes with Patient  today in which greater than 50% of this time was spent in counseling/coordination of care regarding Diagnostic results, Risks and benefits of tx options, Intructions for management, Importance of tx compliance and Impressions  Barrier(s) to care: None  The patient is able to self care     -------------------------------------------------------------------------------------------------------    Chief complaint:   Chief Complaint   Patient presents with    Follow-up     3 weeks follow up on tx  Autoimmune Hemolytic Anemia  Labs in [address] [place] [address]  History of present illness: This is a 59-year-old male with past medical history of autoimmune hemolytic anemia  His history is outlined below  10/31/16 Patient was jaundice and had been feeling poorly since mid September 11/2/16 through 11/4/16 was admitted to [facility] secondary to fatigue in yellowing skin  Hemoglobin was found to be 5 7 after 1 L of fluid it decreased to 4 6 and his total bilirubin was 5 38 otherwise normal LFTs  At that time, hepatosplenomegaly was noted with a spleen size of 16 2 cm  There is no evidence of lymphoma or obstructive pathology  Patient was transfused and was found to have a positive CEE and was diagnosed with 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  With an LDH of 772  HIV status was negative  Patient was started on prednisone at that time    He was discharged home with prednisone taper with folic acid  11/16/16 hemoglobin 12 3, , WBC 10 8,   Completed steroid taper in January 2017  He returned to work  2/2/17 - recurrence of hemolytic anemia of 7 8 g/dL, WBC 6 9, , bilirubin 7 55  Patient received transfusions and was re-challanged with Prednisone 50 mg daily     3/9/17 - started on Rituxan weekly x 4 doses; Limited response  Patient had continued to Prednisone at that time  5/18/17 -  Splenectomy followed by prednisone reduction to 10 mg every other day  Pathology demonstrated a spleen waiting 601 g with a red pulp expansion, congestion, him is cider in deposition and extramedullary hematopoiesis  No malignancy was identified  6/6/17 -6/6/17 patient was admitted to [facility]  He was found to have an acute pulmonary embolism and portal vein thrombosis, with admitting CBC demonstrating a WBC = 15 5, hemoglobin = 5 9, platelet count = 4165  Repeat cbc demonstrated WBC = 16, hemoglobin = 5 5, platelet count = 307  Patient was started on Xarelto and was discharged from the hospital   Prednisone was also reinstituted during this visit and the patient was discharged with 80 mg daily until follow up with Hematology  PNH testing negative, SPEP - negative, G6PD -      8/17 -follow-up appointment after nuclear Medicine evaluation for assessed recently in was not evident  At that time patient opted to undergo rechallenge English Rituxan and continue prednisone  9/17-10/17 rechallenge with prednisone and Rituxan  Post treatment CBC demonstrated a hemoglobin of 10 7, WBC = 13 5 platelet count = 059    11/6-11/9 patient was again admitted to [facility] with a diagnosis of shortness of breath secondary to acute pulmonary embolism  Prior to his admission patient had been titrating down of prednisone  After admission patient was increased back to 20 mg daily  *Had a second opinion at [facility]    Report from Dr Theresa Harvey note dated 11/20/17 states that they [facility]) essentially made similar recommendations and diagnosis as had previously been established  12/19/2017 -bone marrow biopsy was completed that demonstrated a hypercellular marrow for age (cellularity of 60-75% with predominantly erythroid and mild megakaryocytic hyperplasia  No increase in blasts and no morphologic evidence of significant dysplasia  No granulomata and metastatic malignancy  Increased stainable/storage iron  No increase in reticulin fibers  12/23/17-laparoscopic cholecystectomy for definitive treatment of acute calculous cholecystitis  1/4/18  Cyclosporin 1 25 mg/kg, danazol 100 mg and Prednisone 30 mg  Daily    Danazol was discontinued due to increase in blood pressure and psychological effects    2/19/18 Cyclosporin 200 mg po daily and prednisone 10 mg po daily  3/12/18 Progression  Started on Cytoxan 1000 mg, Rituxan 375 mg/m2 with prednisone 20 mg     3/29/18 - Admitted for neutropenia secondary to treatment  Treatment held  4/17/18 -  Improvement of hemoglobin partially to transfusion 11 3g/dl  Cont on treatment  5/7/18 cycle 3 of Cytoxan and Rituxian  Prednisone reduced to 10 mg every other day  6/2018 patient was on his way to a golf course in [address] [place] [address] when he developed stroke-like symptoms  Patient was sent to the emergency room close is by  That time he was found to have a DVT and pulmonary embolism while on Xarelto  At that time patient was transfused as his hemoglobin was low and he was transitioned to Lovenox  6/27/18 patient was seen in an office appointment Dr Juan Santiago  Patient was transitioned to Pradaxa 150 mg p o  every 12 hours  Additionally, transplant was discussed with the patient however consultation with transplant physician was not favorable for the patient  Ibrutinib was discussed, however erythropoietin level was elevated and therefore he was not a candidate for treatment    Patient continued on 2 cycle 5 of Cytoxan and Rituxan  Social history:  Prior hemolytic anemia 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  Interval history:  Patient notes feeling well  Patient states that he is still getting transfused every 2 weeks or so  Patient does not have any other significant concerns  Patient is performance status is approximately the same  Patient is scheduled for chemotherapy on the 19th  Review of Systems   Constitutional: Negative for activity change, appetite change, chills, fatigue, fever and unexpected weight change  HENT: Negative for hearing loss, mouth sores, nosebleeds, sore throat, trouble swallowing and voice change  Eyes: Negative for visual disturbance  Respiratory: Negative for cough and shortness of breath  Cardiovascular: Negative for chest pain, palpitations and leg swelling  Gastrointestinal: Positive for nausea (After chemotherapy administration  )  Negative for abdominal pain, blood in stool, constipation, diarrhea and vomiting  Endocrine: Negative for cold intolerance  Genitourinary: Negative for decreased urine volume, dysuria and hematuria  Musculoskeletal: Negative for arthralgias and myalgias  Skin: Negative for rash  Neurological: Negative for dizziness, weakness, numbness and headaches  Hematological: Negative for adenopathy  Does not bruise/bleed easily  Psychiatric/Behavioral: Negative for dysphoric mood       Patient Active Problem List   Diagnosis    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)    Hyperbilirubinemia    Symptomatic anemia    Acute pulmonary embolism (HCC)    Portal vein thrombosis    Elevated blood pressure reading without diagnosis of hypertension    GERD (gastroesophageal reflux disease)     Past Medical History:   Diagnosis Date    Autoimmune hemolytic anemia (HCC)     DVT (deep venous thrombosis) (HCC)     GERD (gastroesophageal reflux disease)     Hemolytic anemia (HCC)     Palpitation     Portal vein thrombosis     Pulmonary emboli (HCC)     Tobacco abuse      Past Surgical History:   Procedure Laterality Date    KNEE SURGERY Right     [place] LAP,CHOLECYSTECTOMY/GRAPH N/A 12/23/2017    Procedure: CHOLECYSTECTOMY LAPAROSCOPIC with cholangiogram;  Surgeon: Ruby Newsome MD;  [facility];  Service: General    PA REMOVAL SPLEEN, TOTAL N/A 5/18/2017    Procedure: LAPAROSCOPIC HAND ASSIST SPLENECTOMY;  Surgeon: Isabella Basurto MD;  [facility];  Service: Surgical Oncology    SHOULDER SURGERY Left      Family History   Problem Relation Age of Onset    No Known Problems Mother     No Known Problems Father        Social History     Social History    Marital status: /Civil Union     Spouse name: N/A    Number of children: N/A    Years of education: N/A     Social History Main Topics    Smoking status: Light Tobacco Smoker     Types: Cigars    Smokeless tobacco: Current User      Comment: socially    Alcohol use 3 6 oz/week     6 Cans of beer per week      Comment: social    Drug use: No    Sexual activity: Not Asked     Other Topics Concern    None     Social History Narrative    None     Current Outpatient Prescriptions:     amoxicillin-clavulanate (AUGMENTIN) 875-125 mg per tablet, Take 1 tablet by mouth every 12 (twelve) hours for 10 days, Disp: 20 tablet, Rfl: 0    aspirin 81 MG tablet, Take 81 mg by mouth daily, Disp: , Rfl:     cyanocobalamin (VITAMIN B-12) 1,000 mcg tablet, Take 1,000 mcg by mouth daily, Disp: , Rfl:     dabigatran etexilate (PRADAXA) 150 mg capsu, Take 1 capsule (150 mg total) by mouth 2 (two) times a day, Disp: 60 capsule, Rfl: 5    enoxaparin (LOVENOX) 100 mg/mL, Inject 90 mg under the skin daily For 30 days starting on 6/23, Disp: , Rfl:     ergocalciferol (VITAMIN D2) 50,000 units, Take 50,000 Units by mouth once a week  , Disp: , Rfl:     Multiple Vitamins-Minerals (ONE DAILY MENS) TABS, Take by mouth, Disp: , Rfl:     pantoprazole (PROTONIX) 40 mg tablet, Take 1 tablet by mouth daily in the early morning, Disp: 30 tablet, Rfl: 0    predniSONE 10 mg tablet, Take 6 tablets (60 mg total) by mouth titrated, Disp: 120 tablet, Rfl: 1    Allergies   Allergen Reactions    Iodinated Diagnostic Agents Hives       Objective:  /74 (BP Location: Right arm, Cuff Size: Standard)   Pulse 84   Temp 98 2 °F (36 8 °C) (Tympanic)   Resp 18   Ht 5' 7 99" (1 727 m)   Wt 83 8 kg (184 lb 12 8 oz)   SpO2 96%   BMI 28 11 kg/m²    Physical Exam    Result Review  Labs:  Component      Latest Ref Rng & Units 7/5/2018 7/12/2018           11:51 AM   WBC      4 31 - 10 16 Thousand/uL 12 82 (H) 14 10 (H)   RBC      3 88 - 5 62 Million/uL 1 05 (L) 1 52 (L)   Hemoglobin      12 0 - 17 0 g/dL 6 0 (LL) 6 6 (LL)   Hematocrit      36 5 - 49 3 % 17 1 (L) 21 4 (L)   MCV      82 - 98 fL 163 (H) 141 (H)   MCH      26 8 - 34 3 pg 57 1 (H) 43 8 (H)   MCHC      31 4 - 37 4 g/dL 35 1 31 1 (L)   MPV      8 9 - 12 7 fL 10 9 8 5 (L)   Platelets      728 - 390 Thousands/uL 565 (H) 558 (H)   nRBC      /100 WBCs 83      Please note: This report has been generated by a voice recognition software system  Therefore there may be syntax, spelling, and/or grammatical errors  Please call if you have any questions

## 2018-07-17 NOTE — TELEPHONE ENCOUNTER
----- Message from Dorcas Cummins PA-C sent at 7/17/2018 12:40 PM EDT -----  Please schedule this patient for a second opinion appt at [facility]    Number to make an appointment [phone]  Make appt with Lefty Dugan MD   Earliest availability is on 8/7 as of my phone call with the briefly to discuss doctors  Please make pt aware of appt after it is scheduled  Thanks

## 2018-07-18 NOTE — TELEPHONE ENCOUNTER
Patient was given an appointment with Dr Ifeoma Bardales for [email or web address]  [phone] [address] floor [place] wing  I called patient with this information and he verbalized acceptance

## 2018-07-19 ENCOUNTER — TELEPHONE (OUTPATIENT)
Dept: HEMATOLOGY ONCOLOGY | Facility: CLINIC | Age: 63
End: 2018-07-19

## 2018-07-19 ENCOUNTER — HOSPITAL ENCOUNTER (OUTPATIENT)
Dept: INFUSION CENTER | Facility: CLINIC | Age: 63
Discharge: HOME | End: 2018-07-19

## 2018-07-19 ENCOUNTER — APPOINTMENT (OUTPATIENT)
Dept: LAB | Facility: MEDICAL CENTER | Age: 63
End: 2018-07-19
Payer: COMMERCIAL

## 2018-07-19 DIAGNOSIS — D59.10 AUTOIMMUNE HEMOLYTIC ANEMIA WITH IMMUNE THROMBOCYTOPENIA (HCC): Primary | ICD-10-CM

## 2018-07-19 DIAGNOSIS — D69.6 AUTOIMMUNE HEMOLYTIC ANEMIA WITH IMMUNE THROMBOCYTOPENIA (HCC): Primary | ICD-10-CM

## 2018-07-19 DIAGNOSIS — D59.10 ANEMIA, AUTOIMMUNE HEMOLYTIC (HCC): ICD-10-CM

## 2018-07-19 LAB
ALBUMIN SERPL BCP-MCNC: 3.9 G/DL (ref 3.5–5)
ALP SERPL-CCNC: 75 U/L (ref 46–116)
ALT SERPL W P-5'-P-CCNC: 92 U/L (ref 12–78)
ANION GAP SERPL CALCULATED.3IONS-SCNC: 9 MMOL/L (ref 4–13)
ANISOCYTOSIS BLD QL SMEAR: PRESENT
AST SERPL W P-5'-P-CCNC: 56 U/L (ref 5–45)
BASOPHILS # BLD MANUAL: 0 THOUSAND/UL (ref 0–0.1)
BASOPHILS NFR MAR MANUAL: 0 % (ref 0–1)
BILIRUB SERPL-MCNC: 5.91 MG/DL (ref 0.2–1)
BUN SERPL-MCNC: 16 MG/DL (ref 5–25)
CALCIUM SERPL-MCNC: 8.9 MG/DL (ref 8.3–10.1)
CHLORIDE SERPL-SCNC: 106 MMOL/L (ref 100–108)
CO2 SERPL-SCNC: 25 MMOL/L (ref 21–32)
CREAT SERPL-MCNC: 1.04 MG/DL (ref 0.6–1.3)
EOSINOPHIL # BLD MANUAL: 0 THOUSAND/UL (ref 0–0.4)
EOSINOPHIL NFR BLD MANUAL: 0 % (ref 0–6)
ERYTHROCYTE [DISTWIDTH] IN BLOOD BY AUTOMATED COUNT: 26.5 % (ref 11.6–15.1)
GFR SERPL CREATININE-BSD FRML MDRD: 76 ML/MIN/1.73SQ M
GIANT PLATELETS BLD QL SMEAR: PRESENT
GLUCOSE P FAST SERPL-MCNC: 148 MG/DL (ref 65–99)
HCT VFR BLD AUTO: 21.4 % (ref 36.5–49.3)
HGB BLD-MCNC: 7.5 G/DL (ref 12–17)
LYMPHOCYTES # BLD AUTO: 17 % (ref 14–44)
LYMPHOCYTES # BLD AUTO: 2.25 THOUSAND/UL (ref 0.6–4.47)
MACROCYTES BLD QL AUTO: PRESENT
MCH RBC QN AUTO: 48.7 PG (ref 26.8–34.3)
MCHC RBC AUTO-ENTMCNC: 35 G/DL (ref 31.4–37.4)
MCV RBC AUTO: 139 FL (ref 82–98)
MICROCYTES BLD QL AUTO: PRESENT
MONOCYTES # BLD AUTO: 3.05 THOUSAND/UL (ref 0–1.22)
MONOCYTES NFR BLD: 23 % (ref 4–12)
MYELOCYTES NFR BLD MANUAL: 1 % (ref 0–1)
NEUTROPHILS # BLD MANUAL: 7.55 THOUSAND/UL (ref 1.85–7.62)
NEUTS SEG NFR BLD AUTO: 57 % (ref 43–75)
NRBC BLD AUTO-RTO: 10 /100 WBC (ref 0–2)
NRBC BLD AUTO-RTO: 10 /100 WBCS
PLATELET # BLD AUTO: 554 THOUSANDS/UL (ref 149–390)
PLATELET BLD QL SMEAR: ADEQUATE
PMV BLD AUTO: 10 FL (ref 8.9–12.7)
POLYCHROMASIA BLD QL SMEAR: PRESENT
POTASSIUM SERPL-SCNC: 3.5 MMOL/L (ref 3.5–5.3)
PROT SERPL-MCNC: 6 G/DL (ref 6.4–8.2)
RBC # BLD AUTO: 1.54 MILLION/UL (ref 3.88–5.62)
RBC MORPH BLD: PRESENT
SODIUM SERPL-SCNC: 140 MMOL/L (ref 136–145)
VARIANT LYMPHS # BLD AUTO: 2 %
WBC # BLD AUTO: 13.25 THOUSAND/UL (ref 4.31–10.16)

## 2018-07-19 PROCEDURE — 85027 COMPLETE CBC AUTOMATED: CPT

## 2018-07-19 PROCEDURE — 36415 COLL VENOUS BLD VENIPUNCTURE: CPT

## 2018-07-19 PROCEDURE — 85007 BL SMEAR W/DIFF WBC COUNT: CPT

## 2018-07-19 PROCEDURE — 80053 COMPREHEN METABOLIC PANEL: CPT

## 2018-07-19 NOTE — TELEPHONE ENCOUNTER
Patient will have CBC and type and screen repeated on Monday - transfusion will be arranged next week PRN  Patient feeling "ok" at this time

## 2018-07-23 ENCOUNTER — APPOINTMENT (OUTPATIENT)
Dept: LAB | Facility: HOSPITAL | Age: 63
End: 2018-07-23
Attending: INTERNAL MEDICINE
Payer: COMMERCIAL

## 2018-07-23 ENCOUNTER — TELEPHONE (OUTPATIENT)
Dept: HEMATOLOGY ONCOLOGY | Facility: CLINIC | Age: 63
End: 2018-07-23

## 2018-07-23 DIAGNOSIS — D69.6 AUTOIMMUNE HEMOLYTIC ANEMIA WITH IMMUNE THROMBOCYTOPENIA (HCC): ICD-10-CM

## 2018-07-23 DIAGNOSIS — D59.10 AUTOIMMUNE HEMOLYTIC ANEMIA WITH IMMUNE THROMBOCYTOPENIA (HCC): ICD-10-CM

## 2018-07-23 DIAGNOSIS — D59.19 OTHER AUTOIMMUNE HEMOLYTIC ANEMIAS: ICD-10-CM

## 2018-07-23 LAB
ABO GROUP BLD: NORMAL
BLD GP AB SCN SERPL QL: POSITIVE
ERYTHROCYTE [DISTWIDTH] IN BLOOD BY AUTOMATED COUNT: 27 % (ref 11.6–15.1)
HCT VFR BLD AUTO: 23.6 % (ref 36.5–49.3)
HGB BLD-MCNC: 7.7 G/DL (ref 12–17)
MCH RBC QN AUTO: 47.8 PG (ref 26.8–34.3)
MCHC RBC AUTO-ENTMCNC: 32.6 G/DL (ref 31.4–37.4)
MCV RBC AUTO: 147 FL (ref 82–98)
PLATELET # BLD AUTO: 552 THOUSANDS/UL (ref 149–390)
PMV BLD AUTO: 10 FL (ref 8.9–12.7)
RBC # BLD AUTO: 1.61 MILLION/UL (ref 3.88–5.62)
RH BLD: POSITIVE
SPECIMEN EXPIRATION DATE: NORMAL
WBC # BLD AUTO: 18.1 THOUSAND/UL (ref 4.31–10.16)

## 2018-07-23 PROCEDURE — 86922 COMPATIBILITY TEST ANTIGLOB: CPT

## 2018-07-23 PROCEDURE — 86921 COMPATIBILITY TEST INCUBATE: CPT

## 2018-07-23 PROCEDURE — 85027 COMPLETE CBC AUTOMATED: CPT

## 2018-07-23 PROCEDURE — 86900 BLOOD TYPING SEROLOGIC ABO: CPT

## 2018-07-23 PROCEDURE — 86870 RBC ANTIBODY IDENTIFICATION: CPT

## 2018-07-23 PROCEDURE — 86850 RBC ANTIBODY SCREEN: CPT

## 2018-07-23 PROCEDURE — 86901 BLOOD TYPING SEROLOGIC RH(D): CPT

## 2018-07-23 PROCEDURE — 36415 COLL VENOUS BLD VENIPUNCTURE: CPT

## 2018-07-23 NOTE — TELEPHONE ENCOUNTER
Patient arranged for 2 units PRBC on 7/25/18  He does c/o episodes of diarrhea over the past 24 hours  He will contact me in the next day or 2 if this continues - stool for c-diff can be checked  He reports diarrhea is better today

## 2018-07-24 LAB
BLOOD GROUP ANTIBODIES SERPL: NORMAL
BLOOD GROUP ANTIBODIES SERPL: NORMAL

## 2018-07-25 ENCOUNTER — HOSPITAL ENCOUNTER (OUTPATIENT)
Dept: INFUSION CENTER | Facility: CLINIC | Age: 63
Discharge: HOME | End: 2018-07-25
Payer: COMMERCIAL

## 2018-07-25 VITALS
RESPIRATION RATE: 18 BRPM | DIASTOLIC BLOOD PRESSURE: 72 MMHG | TEMPERATURE: 98.8 F | SYSTOLIC BLOOD PRESSURE: 128 MMHG | HEART RATE: 78 BPM

## 2018-07-25 PROCEDURE — 86906 BLD TYPING SEROLOGIC RH PHNT: CPT

## 2018-07-25 PROCEDURE — 86880 COOMBS TEST DIRECT: CPT

## 2018-07-25 PROCEDURE — 86900 BLOOD TYPING SEROLOGIC ABO: CPT

## 2018-07-25 PROCEDURE — 36430 TRANSFUSION BLD/BLD COMPNT: CPT

## 2018-07-25 PROCEDURE — 86902 BLOOD TYPE ANTIGEN DONOR EA: CPT

## 2018-07-25 PROCEDURE — 86905 BLOOD TYPING RBC ANTIGENS: CPT

## 2018-07-25 PROCEDURE — 86870 RBC ANTIBODY IDENTIFICATION: CPT

## 2018-07-25 PROCEDURE — P9040 RBC LEUKOREDUCED IRRADIATED: HCPCS

## 2018-07-25 PROCEDURE — 86860 RBC ANTIBODY ELUTION: CPT

## 2018-07-25 PROCEDURE — 86901 BLOOD TYPING SEROLOGIC RH(D): CPT

## 2018-07-25 NOTE — PLAN OF CARE
Problem: Potential for Falls  Goal: Patient will remain free of falls  INTERVENTIONS:  - Assess patient frequently for physical needs  -  Identify cognitive and physical deficits and behaviors that affect risk of falls    -  [place] fall precautions as indicated by assessment   - Educate patient/family on patient safety including physical limitations  - Instruct patient to call for assistance with activity based on assessment  - Modify environment to reduce risk of injury  - Consider OT/PT consult to assist with strengthening/mobility   Outcome: Progressing

## 2018-07-25 NOTE — PROGRESS NOTES
Pt received 2 unit PRBC without adverse reaction  Pt left unit in stable condition without question or concern

## 2018-07-30 ENCOUNTER — APPOINTMENT (OUTPATIENT)
Dept: LAB | Facility: MEDICAL CENTER | Age: 63
End: 2018-07-30
Payer: COMMERCIAL

## 2018-07-30 ENCOUNTER — TELEPHONE (OUTPATIENT)
Dept: HEMATOLOGY ONCOLOGY | Facility: CLINIC | Age: 63
End: 2018-07-30

## 2018-07-30 DIAGNOSIS — D59.10 ANEMIA, AUTOIMMUNE HEMOLYTIC (HCC): ICD-10-CM

## 2018-07-30 LAB
ALBUMIN SERPL BCP-MCNC: 3.7 G/DL (ref 3.5–5)
ALP SERPL-CCNC: 64 U/L (ref 46–116)
ALT SERPL W P-5'-P-CCNC: 58 U/L (ref 12–78)
ANION GAP SERPL CALCULATED.3IONS-SCNC: 8 MMOL/L (ref 4–13)
AST SERPL W P-5'-P-CCNC: 33 U/L (ref 5–45)
BASOPHILS # BLD AUTO: 0.04 THOUSANDS/ΜL (ref 0–0.1)
BASOPHILS NFR BLD AUTO: 0 % (ref 0–1)
BILIRUB SERPL-MCNC: 6 MG/DL (ref 0.2–1)
BUN SERPL-MCNC: 19 MG/DL (ref 5–25)
CALCIUM SERPL-MCNC: 8.9 MG/DL (ref 8.3–10.1)
CHLORIDE SERPL-SCNC: 106 MMOL/L (ref 100–108)
CO2 SERPL-SCNC: 27 MMOL/L (ref 21–32)
CREAT SERPL-MCNC: 0.92 MG/DL (ref 0.6–1.3)
EOSINOPHIL # BLD AUTO: 0.18 THOUSAND/ΜL (ref 0–0.61)
EOSINOPHIL NFR BLD AUTO: 1 % (ref 0–6)
GFR SERPL CREATININE-BSD FRML MDRD: 88 ML/MIN/1.73SQ M
GLUCOSE SERPL-MCNC: 111 MG/DL (ref 65–140)
HCT VFR BLD AUTO: 23.2 % (ref 36.5–49.3)
HGB BLD-MCNC: 8.6 G/DL (ref 12–17)
IMM GRANULOCYTES # BLD AUTO: 0.09 THOUSAND/UL (ref 0–0.2)
IMM GRANULOCYTES NFR BLD AUTO: 1 % (ref 0–2)
LYMPHOCYTES # BLD AUTO: 1.98 THOUSANDS/ΜL (ref 0.6–4.47)
LYMPHOCYTES NFR BLD AUTO: 14 % (ref 14–44)
MCH RBC QN AUTO: 50.6 PG (ref 26.8–34.3)
MCHC RBC AUTO-ENTMCNC: 37.1 G/DL (ref 31.4–37.4)
MCV RBC AUTO: 137 FL (ref 82–98)
MONOCYTES # BLD AUTO: 1.36 THOUSAND/ΜL (ref 0.17–1.22)
MONOCYTES NFR BLD AUTO: 10 % (ref 4–12)
NEUTROPHILS # BLD AUTO: 10.42 THOUSANDS/ΜL (ref 1.85–7.62)
NEUTS SEG NFR BLD AUTO: 74 % (ref 43–75)
NRBC BLD AUTO-RTO: 1 /100 WBCS
PLATELET # BLD AUTO: 454 THOUSANDS/UL (ref 149–390)
PMV BLD AUTO: 10.3 FL (ref 8.9–12.7)
POTASSIUM SERPL-SCNC: 3.7 MMOL/L (ref 3.5–5.3)
PROT SERPL-MCNC: 5.9 G/DL (ref 6.4–8.2)
RBC # BLD AUTO: 1.7 MILLION/UL (ref 3.88–5.62)
SODIUM SERPL-SCNC: 141 MMOL/L (ref 136–145)
WBC # BLD AUTO: 14.07 THOUSAND/UL (ref 4.31–10.16)

## 2018-07-30 PROCEDURE — 85025 COMPLETE CBC W/AUTO DIFF WBC: CPT

## 2018-07-30 PROCEDURE — 36415 COLL VENOUS BLD VENIPUNCTURE: CPT

## 2018-07-30 PROCEDURE — 80053 COMPREHEN METABOLIC PANEL: CPT

## 2018-08-06 ENCOUNTER — APPOINTMENT (OUTPATIENT)
Dept: LAB | Facility: MEDICAL CENTER | Age: 63
End: 2018-08-06
Payer: COMMERCIAL

## 2018-08-06 DIAGNOSIS — D59.19 OTHER AUTOIMMUNE HEMOLYTIC ANEMIAS: ICD-10-CM

## 2018-08-06 LAB
ERYTHROCYTE [DISTWIDTH] IN BLOOD BY AUTOMATED COUNT: 20.1 % (ref 11.6–15.1)
HCT VFR BLD AUTO: 21.6 % (ref 36.5–49.3)
HGB BLD-MCNC: 7.6 G/DL (ref 12–17)
MCH RBC QN AUTO: 51.4 PG (ref 26.8–34.3)
MCHC RBC AUTO-ENTMCNC: 35.2 G/DL (ref 31.4–37.4)
MCV RBC AUTO: 146 FL (ref 82–98)
PLATELET # BLD AUTO: 531 THOUSANDS/UL (ref 149–390)
PMV BLD AUTO: 10.1 FL (ref 8.9–12.7)
RBC # BLD AUTO: 1.48 MILLION/UL (ref 3.88–5.62)
WBC # BLD AUTO: 14.89 THOUSAND/UL (ref 4.31–10.16)

## 2018-08-06 PROCEDURE — 36415 COLL VENOUS BLD VENIPUNCTURE: CPT

## 2018-08-06 PROCEDURE — 85027 COMPLETE CBC AUTOMATED: CPT

## 2018-08-07 ENCOUNTER — OFFICE VISIT (OUTPATIENT)
Dept: HEMATOLOGY ONCOLOGY | Facility: CLINIC | Age: 63
End: 2018-08-07
Payer: COMMERCIAL

## 2018-08-07 VITALS
OXYGEN SATURATION: 98 % | RESPIRATION RATE: 18 BRPM | WEIGHT: 192 LBS | BODY MASS INDEX: 29.1 KG/M2 | DIASTOLIC BLOOD PRESSURE: 80 MMHG | HEIGHT: 68 IN | HEART RATE: 80 BPM | SYSTOLIC BLOOD PRESSURE: 140 MMHG | TEMPERATURE: 98 F

## 2018-08-07 DIAGNOSIS — D59.11 HEMOLYTIC ANEMIA DUE TO WARM ANTIBODY (HCC): Primary | ICD-10-CM

## 2018-08-07 PROCEDURE — 99213 OFFICE O/P EST LOW 20 MIN: CPT | Performed by: INTERNAL MEDICINE

## 2018-08-07 NOTE — PROGRESS NOTES
[facility] HEMATOLOGY ONCOLOGY Shelley Hug  [address] [address] [phone]    Mary Beth Yi,1955, [id number]  08/07/18    Discussion:   In summary, this is a 21-year-old male history of autoimmune hemolytic anemia  Clinically he feels quite well  He is currently on prednisone 60 mg p o  q a m  Marie Sole I suggested adjusting this to 40 mg with breakfast and 20 mg with dinner to mimic normal diurnal pattern  He has an appointment in [place] next week  We will recheck blood counts on August 9th  Transfusion to follow accordingly  I discussed the above with the patient  The patient  voiced understanding and agreement   ______________________________________________________________________    No chief complaint on file  HPI:   No history exists  Interval History:  Clinically stable  0 - Asymptomatic    Review of Systems   Constitutional: Negative for chills and fever  HENT: Negative for nosebleeds  Eyes: Negative for discharge  Respiratory: Negative for cough and shortness of breath  Cardiovascular: Negative for chest pain  Gastrointestinal: Negative for abdominal pain, constipation and diarrhea  Endocrine: Negative for polydipsia  Genitourinary: Negative for hematuria  Musculoskeletal: Negative for arthralgias  Skin: Negative for color change  Allergic/Immunologic: Negative for immunocompromised state  Neurological: Negative for dizziness and headaches  Hematological: Negative for adenopathy  Psychiatric/Behavioral: Negative for agitation         Past Medical History:   Diagnosis Date    Autoimmune hemolytic anemia (HCC)     DVT (deep venous thrombosis) (HCC)     GERD (gastroesophageal reflux disease)     Hemolytic anemia (HCC)     Palpitation     Portal vein thrombosis     Pulmonary emboli (HCC)     Tobacco abuse      Patient Active Problem List   Diagnosis    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 ([address] )  Hyperbilirubinemia    Symptomatic anemia    Acute pulmonary embolism (HCC)    Portal vein thrombosis    Elevated blood pressure reading without diagnosis of hypertension    GERD (gastroesophageal reflux disease)       Current Outpatient Prescriptions:     aspirin 81 MG tablet, Take 81 mg by mouth daily, Disp: , Rfl:     cyanocobalamin (VITAMIN B-12) 1,000 mcg tablet, Take 1,000 mcg by mouth daily, Disp: , Rfl:     dabigatran etexilate (PRADAXA) 150 mg capsu, Take 1 capsule (150 mg total) by mouth 2 (two) times a day, Disp: 60 capsule, Rfl: 5    enoxaparin (LOVENOX) 100 mg/mL, Inject 90 mg under the skin daily For 30 days starting on 6/23, Disp: , Rfl:     ergocalciferol (VITAMIN D2) 50,000 units, Take 50,000 Units by mouth once a week  , Disp: , Rfl:     Multiple Vitamins-Minerals (ONE DAILY MENS) TABS, Take by mouth, Disp: , Rfl:     pantoprazole (PROTONIX) 40 mg tablet, Take 1 tablet by mouth daily in the early morning, Disp: 30 tablet, Rfl: 0    predniSONE 10 mg tablet, Take 6 tablets (60 mg total) by mouth titrated (Patient taking differently: Take 50 mg by mouth titrated  ), Disp: 120 tablet, Rfl: 1  Allergies   Allergen Reactions    Iodinated Diagnostic Agents Hives     Past Surgical History:   Procedure Laterality Date    KNEE SURGERY Right     HI LAP,CHOLECYSTECTOMY/GRAPH N/A 12/23/2017    Procedure: CHOLECYSTECTOMY LAPAROSCOPIC with cholangiogram;  Surgeon: Lakshmi Perez MD;  [facility]: [facility];  Service: General    HI REMOVAL SPLEEN, TOTAL N/A 5/18/2017    Procedure: LAPAROSCOPIC HAND ASSIST SPLENECTOMY;  Surgeon: Roxy Frank MD;  Location:  MAIN OR;  Service: Surgical Oncology    SHOULDER SURGERY Left      Social History     Objective: There were no vitals filed for this visit  Physical Exam   Constitutional: He is oriented to person, place, and time  He appears well-developed  HENT:   Head: Normocephalic  Eyes: Pupils are equal, round, and reactive to light  Neck: Neck supple  Cardiovascular: Normal rate and regular rhythm  No murmur heard  Pulmonary/Chest: Breath sounds normal  He has no wheezes  He has no rales  Abdominal: Soft  There is no tenderness  Musculoskeletal: Normal range of motion  He exhibits no edema or tenderness  Lymphadenopathy:     He has no cervical adenopathy  Neurological: He is alert and oriented to person, place, and time  He has normal reflexes  No cranial nerve deficit  Skin: No rash noted  No erythema  Psychiatric: He has a normal mood and affect  His behavior is normal          Labs: I personally reviewed the labs and imaging pertinent to this patient care

## 2018-08-09 ENCOUNTER — APPOINTMENT (OUTPATIENT)
Dept: LAB | Facility: MEDICAL CENTER | Age: 63
End: 2018-08-09
Payer: COMMERCIAL

## 2018-08-09 ENCOUNTER — TELEPHONE (OUTPATIENT)
Dept: HEMATOLOGY ONCOLOGY | Facility: CLINIC | Age: 63
End: 2018-08-09

## 2018-08-09 DIAGNOSIS — D59.11 HEMOLYTIC ANEMIA DUE TO WARM ANTIBODY (HCC): ICD-10-CM

## 2018-08-09 LAB
ANISOCYTOSIS BLD QL SMEAR: PRESENT
BASOPHILS # BLD MANUAL: 0 THOUSAND/UL (ref 0–0.1)
BASOPHILS NFR MAR MANUAL: 0 % (ref 0–1)
EOSINOPHIL # BLD MANUAL: 0 THOUSAND/UL (ref 0–0.4)
EOSINOPHIL NFR BLD MANUAL: 0 % (ref 0–6)
ERYTHROCYTE [DISTWIDTH] IN BLOOD BY AUTOMATED COUNT: 19.7 % (ref 11.6–15.1)
HCT VFR BLD AUTO: 22.7 % (ref 36.5–49.3)
HGB BLD-MCNC: 8.5 G/DL (ref 12–17)
LYMPHOCYTES # BLD AUTO: 16 % (ref 14–44)
LYMPHOCYTES # BLD AUTO: 2.08 THOUSAND/UL (ref 0.6–4.47)
MACROCYTES BLD QL AUTO: PRESENT
MCH RBC QN AUTO: 56.3 PG (ref 26.8–34.3)
MCHC RBC AUTO-ENTMCNC: 37.4 G/DL (ref 31.4–37.4)
MCV RBC AUTO: 150 FL (ref 82–98)
MICROCYTES BLD QL AUTO: PRESENT
MONOCYTES # BLD AUTO: 0.52 THOUSAND/UL (ref 0–1.22)
MONOCYTES NFR BLD: 4 % (ref 4–12)
NEUTROPHILS # BLD MANUAL: 10.4 THOUSAND/UL (ref 1.85–7.62)
NEUTS SEG NFR BLD AUTO: 80 % (ref 43–75)
NRBC BLD AUTO-RTO: 12 /100 WBC (ref 0–2)
NRBC BLD AUTO-RTO: 6 /100 WBCS
PLATELET # BLD AUTO: 582 THOUSANDS/UL (ref 149–390)
PLATELET BLD QL SMEAR: ABNORMAL
PMV BLD AUTO: 10 FL (ref 8.9–12.7)
POIKILOCYTOSIS BLD QL SMEAR: PRESENT
POLYCHROMASIA BLD QL SMEAR: PRESENT
RBC # BLD AUTO: 1.51 MILLION/UL (ref 3.88–5.62)
RBC MORPH BLD: PRESENT
WBC # BLD AUTO: 13 THOUSAND/UL (ref 4.31–10.16)

## 2018-08-09 PROCEDURE — 85007 BL SMEAR W/DIFF WBC COUNT: CPT

## 2018-08-09 PROCEDURE — 36415 COLL VENOUS BLD VENIPUNCTURE: CPT

## 2018-08-09 PROCEDURE — 85027 COMPLETE CBC AUTOMATED: CPT

## 2018-08-09 NOTE — TELEPHONE ENCOUNTER
Patient called , results HGB 8 5 given to patient  Would like a call back ,does he need a transfusion

## 2018-08-13 ENCOUNTER — APPOINTMENT (OUTPATIENT)
Dept: LAB | Facility: MEDICAL CENTER | Age: 63
End: 2018-08-13
Payer: COMMERCIAL

## 2018-08-13 DIAGNOSIS — K21.9 GASTROESOPHAGEAL REFLUX DISEASE, ESOPHAGITIS PRESENCE NOT SPECIFIED: Primary | ICD-10-CM

## 2018-08-13 DIAGNOSIS — D59.11 HEMOLYTIC ANEMIA DUE TO WARM ANTIBODY (HCC): ICD-10-CM

## 2018-08-13 LAB
BASOPHILS # BLD AUTO: 0.01 THOUSANDS/ΜL (ref 0–0.1)
BASOPHILS NFR BLD AUTO: 0 % (ref 0–1)
EOSINOPHIL # BLD AUTO: 0 THOUSAND/ΜL (ref 0–0.61)
EOSINOPHIL NFR BLD AUTO: 0 % (ref 0–6)
ERYTHROCYTE [DISTWIDTH] IN BLOOD BY AUTOMATED COUNT: 15.9 % (ref 11.6–15.1)
HCT VFR BLD AUTO: 25.7 % (ref 36.5–49.3)
HGB BLD-MCNC: 9 G/DL (ref 12–17)
IMM GRANULOCYTES # BLD AUTO: 0.05 THOUSAND/UL (ref 0–0.2)
IMM GRANULOCYTES NFR BLD AUTO: 1 % (ref 0–2)
LYMPHOCYTES # BLD AUTO: 1.8 THOUSANDS/ΜL (ref 0.6–4.47)
LYMPHOCYTES NFR BLD AUTO: 17 % (ref 14–44)
MCH RBC QN AUTO: 48.9 PG (ref 26.8–34.3)
MCHC RBC AUTO-ENTMCNC: 35 G/DL (ref 31.4–37.4)
MCV RBC AUTO: 140 FL (ref 82–98)
MONOCYTES # BLD AUTO: 1.02 THOUSAND/ΜL (ref 0.17–1.22)
MONOCYTES NFR BLD AUTO: 10 % (ref 4–12)
NEUTROPHILS # BLD AUTO: 7.79 THOUSANDS/ΜL (ref 1.85–7.62)
NEUTS SEG NFR BLD AUTO: 72 % (ref 43–75)
NRBC BLD AUTO-RTO: 2 /100 WBCS
PLATELET # BLD AUTO: 516 THOUSANDS/UL (ref 149–390)
PMV BLD AUTO: 10.1 FL (ref 8.9–12.7)
RBC # BLD AUTO: 1.84 MILLION/UL (ref 3.88–5.62)
WBC # BLD AUTO: 10.67 THOUSAND/UL (ref 4.31–10.16)

## 2018-08-13 PROCEDURE — 36415 COLL VENOUS BLD VENIPUNCTURE: CPT

## 2018-08-13 PROCEDURE — 85025 COMPLETE CBC W/AUTO DIFF WBC: CPT

## 2018-08-13 NOTE — TELEPHONE ENCOUNTER
Ed Grossman called for his CBC results and to request refill of his Protonix  He has several days left, so tomorrow is OK

## 2018-08-14 RX ORDER — PANTOPRAZOLE SODIUM 40 MG/1
40 TABLET, DELAYED RELEASE ORAL
Qty: 30 TABLET | Refills: 2 | Status: SHIPPED | OUTPATIENT
Start: 2018-08-14 | End: 2018-11-19 | Stop reason: SDUPTHER

## 2018-08-20 ENCOUNTER — APPOINTMENT (OUTPATIENT)
Dept: LAB | Facility: MEDICAL CENTER | Age: 63
End: 2018-08-20
Payer: COMMERCIAL

## 2018-08-20 DIAGNOSIS — D59.11 HEMOLYTIC ANEMIA DUE TO WARM ANTIBODY (HCC): ICD-10-CM

## 2018-08-20 DIAGNOSIS — D59.10 ACQUIRED AUTOIMMUNE HEMOLYTIC ANEMIA (HCC): Primary | ICD-10-CM

## 2018-08-20 LAB
BASOPHILS # BLD AUTO: 0.01 THOUSANDS/ΜL (ref 0–0.1)
BASOPHILS NFR BLD AUTO: 0 % (ref 0–1)
EOSINOPHIL # BLD AUTO: 0.01 THOUSAND/ΜL (ref 0–0.61)
EOSINOPHIL NFR BLD AUTO: 0 % (ref 0–6)
ERYTHROCYTE [DISTWIDTH] IN BLOOD BY AUTOMATED COUNT: 13 % (ref 11.6–15.1)
HCT VFR BLD AUTO: 29.3 % (ref 36.5–49.3)
HGB BLD-MCNC: 10.4 G/DL (ref 12–17)
IMM GRANULOCYTES # BLD AUTO: 0.08 THOUSAND/UL (ref 0–0.2)
IMM GRANULOCYTES NFR BLD AUTO: 1 % (ref 0–2)
LYMPHOCYTES # BLD AUTO: 1.62 THOUSANDS/ΜL (ref 0.6–4.47)
LYMPHOCYTES NFR BLD AUTO: 13 % (ref 14–44)
MCH RBC QN AUTO: 49.1 PG (ref 26.8–34.3)
MCHC RBC AUTO-ENTMCNC: 35.5 G/DL (ref 31.4–37.4)
MCV RBC AUTO: 138 FL (ref 82–98)
MONOCYTES # BLD AUTO: 1.15 THOUSAND/ΜL (ref 0.17–1.22)
MONOCYTES NFR BLD AUTO: 10 % (ref 4–12)
NEUTROPHILS # BLD AUTO: 9.2 THOUSANDS/ΜL (ref 1.85–7.62)
NEUTS SEG NFR BLD AUTO: 76 % (ref 43–75)
NRBC BLD AUTO-RTO: 0 /100 WBCS
PLATELET # BLD AUTO: 441 THOUSANDS/UL (ref 149–390)
PMV BLD AUTO: 10 FL (ref 8.9–12.7)
RBC # BLD AUTO: 2.12 MILLION/UL (ref 3.88–5.62)
WBC # BLD AUTO: 12.07 THOUSAND/UL (ref 4.31–10.16)

## 2018-08-20 PROCEDURE — 85025 COMPLETE CBC W/AUTO DIFF WBC: CPT

## 2018-08-20 PROCEDURE — 36415 COLL VENOUS BLD VENIPUNCTURE: CPT

## 2018-08-20 RX ORDER — PREDNISONE 20 MG/1
60 TABLET ORAL DAILY
Qty: 90 TABLET | Refills: 1 | Status: SHIPPED | OUTPATIENT
Start: 2018-08-20 | End: 2018-12-17

## 2018-08-20 NOTE — TELEPHONE ENCOUNTER
Pt called for hgb result and informed  Also states is taking Prednisone 60 mg daily  Has 10 mg tabs  Would prefer 20 mg tabs   Script pended to Dr Millie Sotomayor

## 2018-08-27 ENCOUNTER — APPOINTMENT (OUTPATIENT)
Dept: LAB | Facility: MEDICAL CENTER | Age: 63
End: 2018-08-27
Payer: COMMERCIAL

## 2018-08-27 ENCOUNTER — TELEPHONE (OUTPATIENT)
Dept: HEMATOLOGY ONCOLOGY | Facility: CLINIC | Age: 63
End: 2018-08-27

## 2018-08-27 DIAGNOSIS — D59.11 HEMOLYTIC ANEMIA DUE TO WARM ANTIBODY (HCC): ICD-10-CM

## 2018-08-27 LAB
BASOPHILS # BLD AUTO: 0 THOUSANDS/ΜL (ref 0–0.1)
BASOPHILS NFR BLD AUTO: 0 % (ref 0–1)
EOSINOPHIL # BLD AUTO: 0.05 THOUSAND/ΜL (ref 0–0.61)
EOSINOPHIL NFR BLD AUTO: 0 % (ref 0–6)
ERYTHROCYTE [DISTWIDTH] IN BLOOD BY AUTOMATED COUNT: 12.9 % (ref 11.6–15.1)
HCT VFR BLD AUTO: 34 % (ref 36.5–49.3)
HGB BLD-MCNC: 11 G/DL (ref 12–17)
IMM GRANULOCYTES # BLD AUTO: 0.1 THOUSAND/UL (ref 0–0.2)
IMM GRANULOCYTES NFR BLD AUTO: 1 % (ref 0–2)
LYMPHOCYTES # BLD AUTO: 1.65 THOUSANDS/ΜL (ref 0.6–4.47)
LYMPHOCYTES NFR BLD AUTO: 15 % (ref 14–44)
MCH RBC QN AUTO: 42.3 PG (ref 26.8–34.3)
MCHC RBC AUTO-ENTMCNC: 32.4 G/DL (ref 31.4–37.4)
MCV RBC AUTO: 131 FL (ref 82–98)
MONOCYTES # BLD AUTO: 0.9 THOUSAND/ΜL (ref 0.17–1.22)
MONOCYTES NFR BLD AUTO: 8 % (ref 4–12)
NEUTROPHILS # BLD AUTO: 8.5 THOUSANDS/ΜL (ref 1.85–7.62)
NEUTS SEG NFR BLD AUTO: 76 % (ref 43–75)
NRBC BLD AUTO-RTO: 0 /100 WBCS
PLATELET # BLD AUTO: 420 THOUSANDS/UL (ref 149–390)
PMV BLD AUTO: 10.7 FL (ref 8.9–12.7)
RBC # BLD AUTO: 2.6 MILLION/UL (ref 3.88–5.62)
WBC # BLD AUTO: 11.2 THOUSAND/UL (ref 4.31–10.16)

## 2018-08-27 PROCEDURE — 36415 COLL VENOUS BLD VENIPUNCTURE: CPT

## 2018-08-27 PROCEDURE — 85025 COMPLETE CBC W/AUTO DIFF WBC: CPT

## 2018-09-04 ENCOUNTER — APPOINTMENT (OUTPATIENT)
Dept: LAB | Facility: MEDICAL CENTER | Age: 63
End: 2018-09-04
Payer: COMMERCIAL

## 2018-09-04 DIAGNOSIS — D59.11 HEMOLYTIC ANEMIA DUE TO WARM ANTIBODY (HCC): ICD-10-CM

## 2018-09-04 LAB
BASOPHILS # BLD AUTO: 0.06 THOUSANDS/ΜL (ref 0–0.1)
BASOPHILS NFR BLD AUTO: 1 % (ref 0–1)
EOSINOPHIL # BLD AUTO: 0.24 THOUSAND/ΜL (ref 0–0.61)
EOSINOPHIL NFR BLD AUTO: 3 % (ref 0–6)
ERYTHROCYTE [DISTWIDTH] IN BLOOD BY AUTOMATED COUNT: 13.2 % (ref 11.6–15.1)
HCT VFR BLD AUTO: 33.7 % (ref 36.5–49.3)
HGB BLD-MCNC: 11.7 G/DL (ref 12–17)
IMM GRANULOCYTES # BLD AUTO: 0.1 THOUSAND/UL (ref 0–0.2)
IMM GRANULOCYTES NFR BLD AUTO: 1 % (ref 0–2)
LYMPHOCYTES # BLD AUTO: 1.87 THOUSANDS/ΜL (ref 0.6–4.47)
LYMPHOCYTES NFR BLD AUTO: 21 % (ref 14–44)
MCH RBC QN AUTO: 45.7 PG (ref 26.8–34.3)
MCHC RBC AUTO-ENTMCNC: 34.7 G/DL (ref 31.4–37.4)
MCV RBC AUTO: 132 FL (ref 82–98)
MONOCYTES # BLD AUTO: 0.85 THOUSAND/ΜL (ref 0.17–1.22)
MONOCYTES NFR BLD AUTO: 10 % (ref 4–12)
NEUTROPHILS # BLD AUTO: 5.74 THOUSANDS/ΜL (ref 1.85–7.62)
NEUTS SEG NFR BLD AUTO: 64 % (ref 43–75)
NRBC BLD AUTO-RTO: 0 /100 WBCS
PLATELET # BLD AUTO: 378 THOUSANDS/UL (ref 149–390)
PMV BLD AUTO: 10.2 FL (ref 8.9–12.7)
RBC # BLD AUTO: 2.56 MILLION/UL (ref 3.88–5.62)
WBC # BLD AUTO: 8.86 THOUSAND/UL (ref 4.31–10.16)

## 2018-09-04 PROCEDURE — 85025 COMPLETE CBC W/AUTO DIFF WBC: CPT

## 2018-09-04 PROCEDURE — 36415 COLL VENOUS BLD VENIPUNCTURE: CPT

## 2018-09-05 ENCOUNTER — TELEPHONE (OUTPATIENT)
Dept: HEMATOLOGY ONCOLOGY | Facility: CLINIC | Age: 63
End: 2018-09-05

## 2018-09-10 ENCOUNTER — APPOINTMENT (OUTPATIENT)
Dept: LAB | Facility: MEDICAL CENTER | Age: 63
End: 2018-09-10
Payer: COMMERCIAL

## 2018-09-10 ENCOUNTER — TELEPHONE (OUTPATIENT)
Dept: HEMATOLOGY ONCOLOGY | Facility: CLINIC | Age: 63
End: 2018-09-10

## 2018-09-10 DIAGNOSIS — D59.11 HEMOLYTIC ANEMIA DUE TO WARM ANTIBODY (HCC): ICD-10-CM

## 2018-09-10 NOTE — TELEPHONE ENCOUNTER
Pt called to check on hemoglobin level after blood work this morning  But results were not in yet  Pt will call tomorrow to check if results from blood work are in

## 2018-09-11 ENCOUNTER — TELEPHONE (OUTPATIENT)
Dept: HEMATOLOGY ONCOLOGY | Facility: CLINIC | Age: 63
End: 2018-09-11

## 2018-09-16 PROBLEM — R16.1 SPLENOMEGALY: Status: ACTIVE | Noted: 2017-05-02

## 2018-09-17 ENCOUNTER — APPOINTMENT (OUTPATIENT)
Dept: LAB | Facility: MEDICAL CENTER | Age: 63
End: 2018-09-17
Payer: COMMERCIAL

## 2018-09-17 ENCOUNTER — TELEPHONE (OUTPATIENT)
Dept: HEMATOLOGY ONCOLOGY | Facility: CLINIC | Age: 63
End: 2018-09-17

## 2018-09-17 DIAGNOSIS — D59.11 HEMOLYTIC ANEMIA DUE TO WARM ANTIBODY (HCC): ICD-10-CM

## 2018-09-18 ENCOUNTER — DOCUMENTATION (OUTPATIENT)
Dept: HEMATOLOGY ONCOLOGY | Facility: CLINIC | Age: 63
End: 2018-09-18

## 2018-09-18 ENCOUNTER — OFFICE VISIT (OUTPATIENT)
Dept: FAMILY MEDICINE CLINIC | Facility: CLINIC | Age: 63
End: 2018-09-18
Payer: COMMERCIAL

## 2018-09-18 ENCOUNTER — OFFICE VISIT (OUTPATIENT)
Dept: HEMATOLOGY ONCOLOGY | Facility: CLINIC | Age: 63
End: 2018-09-18
Payer: COMMERCIAL

## 2018-09-18 VITALS
WEIGHT: 198 LBS | DIASTOLIC BLOOD PRESSURE: 80 MMHG | HEART RATE: 72 BPM | SYSTOLIC BLOOD PRESSURE: 140 MMHG | BODY MASS INDEX: 30.01 KG/M2 | HEIGHT: 68 IN | RESPIRATION RATE: 16 BRPM | TEMPERATURE: 97.9 F

## 2018-09-18 VITALS
RESPIRATION RATE: 16 BRPM | DIASTOLIC BLOOD PRESSURE: 72 MMHG | HEIGHT: 68 IN | BODY MASS INDEX: 29.4 KG/M2 | SYSTOLIC BLOOD PRESSURE: 142 MMHG | WEIGHT: 194 LBS | HEART RATE: 79 BPM | TEMPERATURE: 97.9 F | OXYGEN SATURATION: 97 %

## 2018-09-18 DIAGNOSIS — D59.11 HEMOLYTIC ANEMIA DUE TO WARM ANTIBODY (HCC): Primary | ICD-10-CM

## 2018-09-18 DIAGNOSIS — E83.19 IRON OVERLOAD: Primary | ICD-10-CM

## 2018-09-18 DIAGNOSIS — E83.111 HEMOCHROMATOSIS AFTER MULTIPLE RED BLOOD CELL TRANSFUSIONS: ICD-10-CM

## 2018-09-18 DIAGNOSIS — J01.10 ACUTE FRONTAL SINUSITIS, RECURRENCE NOT SPECIFIED: Primary | ICD-10-CM

## 2018-09-18 PROCEDURE — 99213 OFFICE O/P EST LOW 20 MIN: CPT | Performed by: FAMILY MEDICINE

## 2018-09-18 PROCEDURE — 3008F BODY MASS INDEX DOCD: CPT | Performed by: FAMILY MEDICINE

## 2018-09-18 PROCEDURE — 4004F PT TOBACCO SCREEN RCVD TLK: CPT | Performed by: FAMILY MEDICINE

## 2018-09-18 PROCEDURE — 99214 OFFICE O/P EST MOD 30 MIN: CPT | Performed by: INTERNAL MEDICINE

## 2018-09-18 RX ORDER — DEFERASIROX 360 MG/1
TABLET, FILM COATED ORAL
Qty: 90 TABLET | Refills: 3 | Status: SHIPPED | OUTPATIENT
Start: 2018-09-18 | End: 2019-01-14 | Stop reason: SDUPTHER

## 2018-09-18 RX ORDER — AZITHROMYCIN 250 MG/1
TABLET, FILM COATED ORAL
Qty: 6 TABLET | Refills: 0 | Status: SHIPPED | OUTPATIENT
Start: 2018-09-18 | End: 2018-09-22

## 2018-09-18 NOTE — PROGRESS NOTES
Assessment/Plan:    Patient will be started on a Z-Pablo and instructed to use Flonase 2 sprays per nostril daily  Patient may take Mucinex p r n , increase fluids and rest   Return to the office in 1 week or sooner p r n     For flu injection when feeling better  Diagnoses and all orders for this visit:    Acute frontal sinusitis, recurrence not specified  Comments:  Z-Pablo and Flonase daily  Mucinex p r n   Increase fluids and rest   Orders:  -     azithromycin (ZITHROMAX) 250 mg tablet; Take 2 tablets today then 1 tablet daily x 4 days          Subjective:      Patient ID: Teto Cazares is a 58 y o  male  Patient started 10 days ago with cold symptoms  He complains of nasal congestion, cough and sinus pressure headache  Patient denies fever  URI    This is a new problem  The current episode started 1 to 4 weeks ago  The problem has been unchanged  There has been no fever  Associated symptoms include congestion, coughing, headaches, a plugged ear sensation, rhinorrhea and sinus pain  Pertinent negatives include no ear pain, sneezing, sore throat, swollen glands or wheezing  He has tried increased fluids for the symptoms  The treatment provided mild relief  The following portions of the patient's history were reviewed and updated as appropriate: allergies, current medications, past family history, past medical history, past social history, past surgical history and problem list     Review of Systems   HENT: Positive for congestion, rhinorrhea and sinus pain  Negative for ear pain, sneezing and sore throat  Respiratory: Positive for cough  Negative for wheezing  Neurological: Positive for headaches           Objective:      /80 (BP Location: Left arm, Patient Position: Sitting, Cuff Size: Standard)   Pulse 72   Temp 97 9 °F (36 6 °C) (Tympanic)   Resp 16   Ht 5' 8" (1 727 m)   Wt 89 8 kg (198 lb)   BMI 30 11 kg/m²          Physical Exam   Constitutional: He is oriented to person, place, and time  He appears well-developed and well-nourished  HENT:   Head: Normocephalic  Right Ear: External ear normal    Left Ear: External ear normal    Positive turbinates swelling with mucoid drainage  Throat postnasal drainage and injected  Mucous membranes moist    Eyes: Conjunctivae are normal    Neck: Neck supple  Cardiovascular: Normal rate and regular rhythm  Pulmonary/Chest: Effort normal and breath sounds normal  He has no wheezes  Abdominal: Soft  There is no tenderness  Musculoskeletal: He exhibits no edema  Lymphadenopathy:     He has no cervical adenopathy  Neurological: He is alert and oriented to person, place, and time  Skin: Skin is warm and dry  Psychiatric: He has a normal mood and affect

## 2018-09-18 NOTE — PROGRESS NOTES
Received notification from clinical on 9/18/2018 pt will be starting jawade  Pt has [facility]  ID [id number]  [facility] [id number]    Phone number for benefits [phone]  Phone number for authorization [phone] ([facility])    Called ins @ 1:48 ([phone]) spoke with Erica Sim who stated that I needed to speak with someone at [facility] to find out if Jeanette Gene is needed  She transferred me to [facility]   Spoke with Mookie Gee who stated no Jeanette Gene is needed, pt is not capitated to a specific pharmacy  He can use any in network pharmacy  He will have a 30% coins of the cost of the drug  Call ref # is today's dt 9/18/2018 and rep name     Notified clinical, financial, and [facility]  Requested [facility] to let us know if they are out of network

## 2018-09-18 NOTE — PROGRESS NOTES
[facility] HEMATOLOGY ONCOLOGY [place]  [address] [address] [phone]    Leodan Yi,1955, [id number]  09/18/18    Discussion:   In summary, this is a 42-year-old male history of autoimmune hemolytic anemia  He has been on prednisone starting at 60 mg tapering to his current 25  He is experiencing what we have experienced in his case previously  That is to say, he responds well to prednisone but the required dose prohibits long-term use (i e  years)  He has been to [facility]  Studies have been accomplished  I am not aware that 80 new or altered diagnosis was established  There is a research trial for which he is eligible  He will be following up there are in a month or so from now  Additionally, he has had iron overload  He is agreeable to institution of chelation therapy  Patty Spitz is prescribed at 14 mg/kg per day  I discussed the above with the patient  The patient  voiced understanding and agreement   ______________________________________________________________________    No chief complaint on file  HPI:   No history exists  Interval History:  Clinically stable  0 - Asymptomatic    Review of Systems   Constitutional: Negative for chills and fever  HENT: Negative for nosebleeds  Eyes: Negative for discharge  Respiratory: Negative for cough and shortness of breath  Cardiovascular: Negative for chest pain  Gastrointestinal: Negative for abdominal pain, constipation and diarrhea  Endocrine: Negative for polydipsia  Genitourinary: Negative for hematuria  Musculoskeletal: Negative for arthralgias  Skin: Negative for color change  Allergic/Immunologic: Negative for immunocompromised state  Neurological: Negative for dizziness and headaches  Hematological: Negative for adenopathy  Psychiatric/Behavioral: Negative for agitation         Past Medical History:   Diagnosis Date    Autoimmune hemolytic anemia (HCC)     LOWE (dyspnea on exertion)     last assessed 11/02/2016    DVT (deep venous thrombosis) (HCC)     GERD (gastroesophageal reflux disease)     Hemolytic anemia (HCC)     Palpitation     Portal vein thrombosis     Pulmonary emboli (HCC)     Tobacco abuse      Patient Active Problem List   Diagnosis    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)    Hyperbilirubinemia    Symptomatic anemia    Pulmonary embolism (HCC)    Portal vein thrombosis    Elevated blood pressure reading without diagnosis of hypertension    GERD (gastroesophageal reflux disease)    Autoimmune hemolytic anemia (HCC)    Splenomegaly       Current Outpatient Prescriptions:     aspirin 81 MG tablet, Take 81 mg by mouth daily, Disp: , Rfl:     cyanocobalamin (VITAMIN B-12) 1,000 mcg tablet, Take 1,000 mcg by mouth daily, Disp: , Rfl:     dabigatran etexilate (PRADAXA) 150 mg capsu, Take 1 capsule (150 mg total) by mouth 2 (two) times a day, Disp: 60 capsule, Rfl: 5    enoxaparin (LOVENOX) 100 mg/mL, Inject 90 mg under the skin daily For 30 days starting on 6/23, Disp: , Rfl:     ergocalciferol (VITAMIN D2) 50,000 units, Take 50,000 Units by mouth once a week  , Disp: , Rfl:     Multiple Vitamins-Minerals (ONE DAILY MENS) TABS, Take by mouth, Disp: , Rfl:     pantoprazole (PROTONIX) 40 mg tablet, Take 1 tablet (40 mg total) by mouth daily in the early morning, Disp: 30 tablet, Rfl: 2    predniSONE 20 mg tablet, Take 3 tablets (60 mg total) by mouth daily Or as directed with food, Disp: 90 tablet, Rfl: 1  Allergies   Allergen Reactions    Iodinated Diagnostic Agents Hives     Past Surgical History:   Procedure Laterality Date    ELBOW ARTHROPLASTY Left     bursectomy    KNEE SURGERY Right     meniscus tear    MD LAP,CHOLECYSTECTOMY/GRAPH N/A 12/23/2017    Procedure: CHOLECYSTECTOMY LAPAROSCOPIC with cholangiogram;  Surgeon: Jorge Arnett MD;  [facility];  Service: Staci Irene MS REMOVAL SPLEEN, TOTAL N/A 5/18/2017    Procedure: LAPAROSCOPIC HAND ASSIST SPLENECTOMY;  Surgeon: Valerie Green MD;  [facility];  Service: Surgical Oncology    SHOULDER SURGERY Left     rotator cuff x4, reconstruction     Social History     Objective: There were no vitals filed for this visit  Physical Exam   Constitutional: He is oriented to person, place, and time  He appears well-developed  HENT:   Head: Normocephalic  Eyes: Pupils are equal, round, and reactive to light  Neck: Neck supple  Cardiovascular: Normal rate and regular rhythm  No murmur heard  Pulmonary/Chest: Breath sounds normal  He has no wheezes  He has no rales  Abdominal: Soft  There is no tenderness  Musculoskeletal: Normal range of motion  He exhibits no edema or tenderness  Lymphadenopathy:     He has no cervical adenopathy  Neurological: He is alert and oriented to person, place, and time  He has normal reflexes  No cranial nerve deficit  Skin: No rash noted  No erythema  Psychiatric: He has a normal mood and affect  His behavior is normal          Labs: I personally reviewed the labs and imaging pertinent to this patient care

## 2018-09-24 ENCOUNTER — APPOINTMENT (OUTPATIENT)
Dept: LAB | Facility: MEDICAL CENTER | Age: 63
End: 2018-09-24
Payer: COMMERCIAL

## 2018-09-24 ENCOUNTER — TELEPHONE (OUTPATIENT)
Dept: HEMATOLOGY ONCOLOGY | Facility: CLINIC | Age: 63
End: 2018-09-24

## 2018-09-24 DIAGNOSIS — D59.11 HEMOLYTIC ANEMIA DUE TO WARM ANTIBODY (HCC): ICD-10-CM

## 2018-09-24 DIAGNOSIS — E83.111 HEMOCHROMATOSIS AFTER MULTIPLE RED BLOOD CELL TRANSFUSIONS: ICD-10-CM

## 2018-09-24 LAB
BASOPHILS # BLD AUTO: 0.13 THOUSANDS/ΜL (ref 0–0.1)
BASOPHILS NFR BLD AUTO: 1 % (ref 0–1)
EOSINOPHIL # BLD AUTO: 0.59 THOUSAND/ΜL (ref 0–0.61)
EOSINOPHIL NFR BLD AUTO: 5 % (ref 0–6)
ERYTHROCYTE [DISTWIDTH] IN BLOOD BY AUTOMATED COUNT: 14.1 % (ref 11.6–15.1)
FERRITIN SERPL-MCNC: 3631 NG/ML (ref 8–388)
HCT VFR BLD AUTO: 39.9 % (ref 36.5–49.3)
HGB BLD-MCNC: 12.8 G/DL (ref 12–17)
IMM GRANULOCYTES # BLD AUTO: 0.13 THOUSAND/UL (ref 0–0.2)
IMM GRANULOCYTES NFR BLD AUTO: 1 % (ref 0–2)
IRON SATN MFR SERPL: 48 %
IRON SERPL-MCNC: 225 UG/DL (ref 65–175)
LYMPHOCYTES # BLD AUTO: 3.81 THOUSANDS/ΜL (ref 0.6–4.47)
LYMPHOCYTES NFR BLD AUTO: 32 % (ref 14–44)
MCH RBC QN AUTO: 37.5 PG (ref 26.8–34.3)
MCHC RBC AUTO-ENTMCNC: 32.1 G/DL (ref 31.4–37.4)
MCV RBC AUTO: 117 FL (ref 82–98)
MONOCYTES # BLD AUTO: 1.09 THOUSAND/ΜL (ref 0.17–1.22)
MONOCYTES NFR BLD AUTO: 9 % (ref 4–12)
NEUTROPHILS # BLD AUTO: 6.03 THOUSANDS/ΜL (ref 1.85–7.62)
NEUTS SEG NFR BLD AUTO: 52 % (ref 43–75)
NRBC BLD AUTO-RTO: 0 /100 WBCS
PLATELET # BLD AUTO: 333 THOUSANDS/UL (ref 149–390)
PMV BLD AUTO: 11.8 FL (ref 8.9–12.7)
RBC # BLD AUTO: 3.41 MILLION/UL (ref 3.88–5.62)
TIBC SERPL-MCNC: 470 UG/DL (ref 250–450)
WBC # BLD AUTO: 11.78 THOUSAND/UL (ref 4.31–10.16)

## 2018-09-24 PROCEDURE — 83540 ASSAY OF IRON: CPT

## 2018-09-24 PROCEDURE — 82728 ASSAY OF FERRITIN: CPT

## 2018-09-24 PROCEDURE — 36415 COLL VENOUS BLD VENIPUNCTURE: CPT

## 2018-09-24 PROCEDURE — 83550 IRON BINDING TEST: CPT

## 2018-09-24 PROCEDURE — 85025 COMPLETE CBC W/AUTO DIFF WBC: CPT

## 2018-09-25 ENCOUNTER — TELEPHONE (OUTPATIENT)
Dept: HEMATOLOGY ONCOLOGY | Facility: CLINIC | Age: 63
End: 2018-09-25

## 2018-10-01 ENCOUNTER — APPOINTMENT (OUTPATIENT)
Dept: LAB | Facility: MEDICAL CENTER | Age: 63
End: 2018-10-01
Payer: COMMERCIAL

## 2018-10-01 DIAGNOSIS — D59.11 HEMOLYTIC ANEMIA DUE TO WARM ANTIBODY (HCC): ICD-10-CM

## 2018-10-01 LAB
BASOPHILS # BLD AUTO: 0 THOUSANDS/ΜL (ref 0–0.1)
BASOPHILS NFR BLD AUTO: 0 % (ref 0–1)
EOSINOPHIL # BLD AUTO: 0.5 THOUSAND/ΜL (ref 0–0.61)
EOSINOPHIL NFR BLD AUTO: 4 % (ref 0–6)
HGB BLD-MCNC: 10 G/DL (ref 12–17)
IMM GRANULOCYTES # BLD AUTO: 0.1 THOUSAND/UL (ref 0–0.2)
IMM GRANULOCYTES NFR BLD AUTO: 1 % (ref 0–2)
LYMPHOCYTES # BLD AUTO: 4.35 THOUSANDS/ΜL (ref 0.6–4.47)
LYMPHOCYTES NFR BLD AUTO: 37 % (ref 14–44)
MONOCYTES # BLD AUTO: 1.15 THOUSAND/ΜL (ref 0.17–1.22)
MONOCYTES NFR BLD AUTO: 10 % (ref 4–12)
NEUTROPHILS # BLD AUTO: 5.7 THOUSANDS/ΜL (ref 1.85–7.62)
NEUTS SEG NFR BLD AUTO: 48 % (ref 43–75)
NRBC BLD AUTO-RTO: 2 /100 WBCS
PLATELET # BLD AUTO: 460 THOUSANDS/UL (ref 149–390)
WBC # BLD AUTO: 11.8 THOUSAND/UL (ref 4.31–10.16)

## 2018-10-01 PROCEDURE — 85025 COMPLETE CBC W/AUTO DIFF WBC: CPT

## 2018-10-01 PROCEDURE — 36415 COLL VENOUS BLD VENIPUNCTURE: CPT

## 2018-10-02 ENCOUNTER — TELEPHONE (OUTPATIENT)
Dept: HEMATOLOGY ONCOLOGY | Facility: CLINIC | Age: 63
End: 2018-10-02

## 2018-10-02 NOTE — TELEPHONE ENCOUNTER
Returned call to patient - He is aware that hgb = 10 0  Patient will increase prednisone to 25mg PO daily from 20mg PO daily and re check CBC in one week  Reviewed with him that Ferritin will be re checked just prior to his visit with Dr Yue Mathis in November  He did start Jadenu about 2 weeks ago    He verbalized understanding of instruction

## 2018-10-08 ENCOUNTER — TELEPHONE (OUTPATIENT)
Dept: HEMATOLOGY ONCOLOGY | Facility: CLINIC | Age: 63
End: 2018-10-08

## 2018-10-08 ENCOUNTER — APPOINTMENT (OUTPATIENT)
Dept: LAB | Facility: MEDICAL CENTER | Age: 63
End: 2018-10-08
Payer: COMMERCIAL

## 2018-10-08 DIAGNOSIS — D59.11 HEMOLYTIC ANEMIA DUE TO WARM ANTIBODY (HCC): ICD-10-CM

## 2018-10-08 LAB
ANISOCYTOSIS BLD QL SMEAR: PRESENT
BASOPHILS # BLD MANUAL: 0 THOUSAND/UL (ref 0–0.1)
BASOPHILS NFR MAR MANUAL: 0 % (ref 0–1)
EOSINOPHIL # BLD MANUAL: 0.17 THOUSAND/UL (ref 0–0.4)
EOSINOPHIL NFR BLD MANUAL: 1 % (ref 0–6)
ERYTHROCYTE [DISTWIDTH] IN BLOOD BY AUTOMATED COUNT: 19 % (ref 11.6–15.1)
GIANT PLATELETS BLD QL SMEAR: PRESENT
HCT VFR BLD AUTO: 31 % (ref 36.5–49.3)
HGB BLD-MCNC: 9.5 G/DL (ref 12–17)
LYMPHOCYTES # BLD AUTO: 22 % (ref 14–44)
LYMPHOCYTES # BLD AUTO: 3.72 THOUSAND/UL (ref 0.6–4.47)
MACROCYTES BLD QL AUTO: PRESENT
MCH RBC QN AUTO: 38.8 PG (ref 26.8–34.3)
MCHC RBC AUTO-ENTMCNC: 30.6 G/DL (ref 31.4–37.4)
MCV RBC AUTO: 127 FL (ref 82–98)
MONOCYTES # BLD AUTO: 1.69 THOUSAND/UL (ref 0–1.22)
MONOCYTES NFR BLD: 10 % (ref 4–12)
NEUTROPHILS # BLD MANUAL: 10.48 THOUSAND/UL (ref 1.85–7.62)
NEUTS BAND NFR BLD MANUAL: 1 % (ref 0–8)
NEUTS SEG NFR BLD AUTO: 61 % (ref 43–75)
NRBC BLD AUTO-RTO: 15 /100 WBCS
NRBC BLD AUTO-RTO: 16 /100 WBC (ref 0–2)
PLATELET # BLD AUTO: 525 THOUSANDS/UL (ref 149–390)
PLATELET BLD QL SMEAR: ABNORMAL
PMV BLD AUTO: 9.9 FL (ref 8.9–12.7)
POLYCHROMASIA BLD QL SMEAR: PRESENT
RBC # BLD AUTO: 2.45 MILLION/UL (ref 3.88–5.62)
RBC MORPH BLD: PRESENT
VARIANT LYMPHS # BLD AUTO: 5 %
WBC # BLD AUTO: 16.9 THOUSAND/UL (ref 4.31–10.16)

## 2018-10-08 PROCEDURE — 85027 COMPLETE CBC AUTOMATED: CPT

## 2018-10-08 PROCEDURE — 85007 BL SMEAR W/DIFF WBC COUNT: CPT

## 2018-10-08 PROCEDURE — 36415 COLL VENOUS BLD VENIPUNCTURE: CPT

## 2018-10-08 NOTE — TELEPHONE ENCOUNTER
Spoke with patient and lab - Patient is not due to have Ferritin checked until just prior to his visit with Dr Jessica Hobson in November

## 2018-10-08 NOTE — TELEPHONE ENCOUNTER
PT DOESN'T HAVE AN ORDER FOR FERRITIN IN CHART  IS SUPPOSED TO GET IT TODAY  PLEASE CALL BACK AND ADVISE   JESU IS ON CETRONIA TILL 12 PM

## 2018-10-15 ENCOUNTER — APPOINTMENT (OUTPATIENT)
Dept: LAB | Facility: MEDICAL CENTER | Age: 63
End: 2018-10-15
Payer: COMMERCIAL

## 2018-10-15 ENCOUNTER — CLINICAL SUPPORT (OUTPATIENT)
Dept: FAMILY MEDICINE CLINIC | Facility: CLINIC | Age: 63
End: 2018-10-15
Payer: COMMERCIAL

## 2018-10-15 ENCOUNTER — TELEPHONE (OUTPATIENT)
Dept: HEMATOLOGY ONCOLOGY | Facility: CLINIC | Age: 63
End: 2018-10-15

## 2018-10-15 DIAGNOSIS — Z23 NEED FOR PNEUMOCOCCAL VACCINATION: Primary | ICD-10-CM

## 2018-10-15 DIAGNOSIS — D59.11 HEMOLYTIC ANEMIA DUE TO WARM ANTIBODY (HCC): ICD-10-CM

## 2018-10-15 DIAGNOSIS — Z23 NEED FOR INFLUENZA VACCINATION: ICD-10-CM

## 2018-10-15 LAB
BASOPHILS # BLD AUTO: 0.03 THOUSANDS/ΜL (ref 0–0.1)
BASOPHILS NFR BLD AUTO: 0 % (ref 0–1)
EOSINOPHIL # BLD AUTO: 0.83 THOUSAND/ΜL (ref 0–0.61)
EOSINOPHIL NFR BLD AUTO: 6 % (ref 0–6)
HCT VFR BLD AUTO: 29 % (ref 36.5–49.3)
HGB BLD-MCNC: 10.8 G/DL (ref 12–17)
IMM GRANULOCYTES # BLD AUTO: 0.13 THOUSAND/UL (ref 0–0.2)
IMM GRANULOCYTES NFR BLD AUTO: 1 % (ref 0–2)
LYMPHOCYTES # BLD AUTO: 3.09 THOUSANDS/ΜL (ref 0.6–4.47)
LYMPHOCYTES NFR BLD AUTO: 21 % (ref 14–44)
MCH RBC QN AUTO: 49.3 PG (ref 26.8–34.3)
MCHC RBC AUTO-ENTMCNC: 37.2 G/DL (ref 31.4–37.4)
MCV RBC AUTO: 132 FL (ref 82–98)
MONOCYTES # BLD AUTO: 1.35 THOUSAND/ΜL (ref 0.17–1.22)
MONOCYTES NFR BLD AUTO: 9 % (ref 4–12)
NEUTROPHILS # BLD AUTO: 9.41 THOUSANDS/ΜL (ref 1.85–7.62)
NEUTS SEG NFR BLD AUTO: 63 % (ref 43–75)
NRBC BLD AUTO-RTO: 4 /100 WBCS
PLATELET # BLD AUTO: 521 THOUSANDS/UL (ref 149–390)
PMV BLD AUTO: 10.1 FL (ref 8.9–12.7)
RBC # BLD AUTO: 2.19 MILLION/UL (ref 3.88–5.62)
WBC # BLD AUTO: 14.84 THOUSAND/UL (ref 4.31–10.16)

## 2018-10-15 PROCEDURE — 85025 COMPLETE CBC W/AUTO DIFF WBC: CPT

## 2018-10-15 PROCEDURE — 90471 IMMUNIZATION ADMIN: CPT

## 2018-10-15 PROCEDURE — 36415 COLL VENOUS BLD VENIPUNCTURE: CPT

## 2018-10-15 PROCEDURE — 90472 IMMUNIZATION ADMIN EACH ADD: CPT

## 2018-10-15 PROCEDURE — 90682 RIV4 VACC RECOMBINANT DNA IM: CPT

## 2018-10-15 PROCEDURE — 90732 PPSV23 VACC 2 YRS+ SUBQ/IM: CPT

## 2018-10-15 NOTE — TELEPHONE ENCOUNTER
PT NEEDS A SLEEP MEDICATION  HASN'T BEEN ABLE TO SLEEP FOR MONTHS  PT WAS WONDERING IF DR SHORT CAN PRESCRIBE SOMETHING THAT HE CAN HELP HIM SLEEP  PLEASE CALL BACK AND ADVISE  THANK YOU

## 2018-10-16 DIAGNOSIS — G47.01 INSOMNIA DUE TO MEDICAL CONDITION: Primary | ICD-10-CM

## 2018-10-16 RX ORDER — LORAZEPAM 0.5 MG/1
0.5 TABLET ORAL EVERY 6 HOURS PRN
Qty: 30 TABLET | Refills: 1 | Status: SHIPPED | OUTPATIENT
Start: 2018-10-16 | End: 2019-08-13

## 2018-10-16 NOTE — TELEPHONE ENCOUNTER
Patient reports he is only able to sleep 3-4 hours during the night without waking up  Script for Ativan will be sent to pharmacy - reviewed PRN instructions    Patient did increase his prednisone on his own to 45mg PO daily - most recent hgb 10 8

## 2018-10-22 ENCOUNTER — TELEPHONE (OUTPATIENT)
Dept: HEMATOLOGY ONCOLOGY | Facility: CLINIC | Age: 63
End: 2018-10-22

## 2018-10-22 ENCOUNTER — APPOINTMENT (OUTPATIENT)
Dept: LAB | Facility: MEDICAL CENTER | Age: 63
End: 2018-10-22
Payer: COMMERCIAL

## 2018-10-22 DIAGNOSIS — D59.11 HEMOLYTIC ANEMIA DUE TO WARM ANTIBODY (HCC): ICD-10-CM

## 2018-10-22 LAB
BASOPHILS # BLD AUTO: 0.06 THOUSANDS/ΜL (ref 0–0.1)
BASOPHILS NFR BLD AUTO: 1 % (ref 0–1)
EOSINOPHIL # BLD AUTO: 0.28 THOUSAND/ΜL (ref 0–0.61)
EOSINOPHIL NFR BLD AUTO: 2 % (ref 0–6)
ERYTHROCYTE [DISTWIDTH] IN BLOOD BY AUTOMATED COUNT: 18.5 % (ref 11.6–15.1)
HCT VFR BLD AUTO: 31.4 % (ref 36.5–49.3)
HGB BLD-MCNC: 11 G/DL (ref 12–17)
IMM GRANULOCYTES # BLD AUTO: 0.09 THOUSAND/UL (ref 0–0.2)
IMM GRANULOCYTES NFR BLD AUTO: 1 % (ref 0–2)
LYMPHOCYTES # BLD AUTO: 1.55 THOUSANDS/ΜL (ref 0.6–4.47)
LYMPHOCYTES NFR BLD AUTO: 13 % (ref 14–44)
MCH RBC QN AUTO: 45.6 PG (ref 26.8–34.3)
MCHC RBC AUTO-ENTMCNC: 35 G/DL (ref 31.4–37.4)
MCV RBC AUTO: 130 FL (ref 82–98)
MONOCYTES # BLD AUTO: 1.08 THOUSAND/ΜL (ref 0.17–1.22)
MONOCYTES NFR BLD AUTO: 9 % (ref 4–12)
NEUTROPHILS # BLD AUTO: 9.2 THOUSANDS/ΜL (ref 1.85–7.62)
NEUTS SEG NFR BLD AUTO: 74 % (ref 43–75)
NRBC BLD AUTO-RTO: 1 /100 WBCS
PLATELET # BLD AUTO: 540 THOUSANDS/UL (ref 149–390)
PMV BLD AUTO: 9.8 FL (ref 8.9–12.7)
RBC # BLD AUTO: 2.41 MILLION/UL (ref 3.88–5.62)
WBC # BLD AUTO: 12.26 THOUSAND/UL (ref 4.31–10.16)

## 2018-10-22 PROCEDURE — 85025 COMPLETE CBC W/AUTO DIFF WBC: CPT

## 2018-10-22 PROCEDURE — 36415 COLL VENOUS BLD VENIPUNCTURE: CPT

## 2018-10-29 ENCOUNTER — TELEPHONE (OUTPATIENT)
Dept: HEMATOLOGY ONCOLOGY | Facility: CLINIC | Age: 63
End: 2018-10-29

## 2018-10-29 ENCOUNTER — APPOINTMENT (OUTPATIENT)
Dept: LAB | Facility: MEDICAL CENTER | Age: 63
End: 2018-10-29
Payer: COMMERCIAL

## 2018-10-29 DIAGNOSIS — D59.11 HEMOLYTIC ANEMIA DUE TO WARM ANTIBODY (HCC): ICD-10-CM

## 2018-10-29 LAB
BASOPHILS # BLD AUTO: 0.02 THOUSANDS/ΜL (ref 0–0.1)
BASOPHILS NFR BLD AUTO: 0 % (ref 0–1)
EOSINOPHIL # BLD AUTO: 0.1 THOUSAND/ΜL (ref 0–0.61)
EOSINOPHIL NFR BLD AUTO: 1 % (ref 0–6)
ERYTHROCYTE [DISTWIDTH] IN BLOOD BY AUTOMATED COUNT: 16.2 % (ref 11.6–15.1)
HCT VFR BLD AUTO: 34.3 % (ref 36.5–49.3)
HGB BLD-MCNC: 12.8 G/DL (ref 12–17)
IMM GRANULOCYTES # BLD AUTO: 0.05 THOUSAND/UL (ref 0–0.2)
IMM GRANULOCYTES NFR BLD AUTO: 1 % (ref 0–2)
LYMPHOCYTES # BLD AUTO: 3.01 THOUSANDS/ΜL (ref 0.6–4.47)
LYMPHOCYTES NFR BLD AUTO: 28 % (ref 14–44)
MCH RBC QN AUTO: 47.9 PG (ref 26.8–34.3)
MCHC RBC AUTO-ENTMCNC: 37.3 G/DL (ref 31.4–37.4)
MCV RBC AUTO: 129 FL (ref 82–98)
MONOCYTES # BLD AUTO: 0.73 THOUSAND/ΜL (ref 0.17–1.22)
MONOCYTES NFR BLD AUTO: 7 % (ref 4–12)
NEUTROPHILS # BLD AUTO: 6.9 THOUSANDS/ΜL (ref 1.85–7.62)
NEUTS SEG NFR BLD AUTO: 63 % (ref 43–75)
NRBC BLD AUTO-RTO: 0 /100 WBCS
PLATELET # BLD AUTO: 477 THOUSANDS/UL (ref 149–390)
PMV BLD AUTO: 10.5 FL (ref 8.9–12.7)
RBC # BLD AUTO: 2.67 MILLION/UL (ref 3.88–5.62)
WBC # BLD AUTO: 10.81 THOUSAND/UL (ref 4.31–10.16)

## 2018-10-29 PROCEDURE — 36415 COLL VENOUS BLD VENIPUNCTURE: CPT

## 2018-10-29 PROCEDURE — 85025 COMPLETE CBC W/AUTO DIFF WBC: CPT

## 2018-10-29 NOTE — TELEPHONE ENCOUNTER
Returned call to patient - reviewed labs  Hgb increased  He will decrease his prednisone by 5mg to 30mg PO daily    He will call with any additional questions or concerns

## 2018-11-02 NOTE — MISCELLANEOUS
Message   Recorded as Task   Date: 09/08/2017 10:12 AM, Created By: Dominga Mercedes   Task Name: Call Back   Assigned To: Patrica Kebede   Regarding Patient: Mcdowell Na, Status: Active   Comment:    Dominga Mercedes - 08 Sep 2017 10:12 AM     TASK CREATED  Caller: Self; [phone] (Home)  Pt is calling for results of spleen testing he had on Wednesday test results are in [employer]  Left msg for patient explaining scan was normal - no extra splenic tissue identified      Active Problems    1  Acute bronchitis (466 0) (J20 9)   2  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)   3  Calf pain (729 5) (M79 669)   4  History of allergy (V15 09) (Z88 9)   5  Pulmonary embolism (415 19) (I26 99)   6  Shortness of breath (786 05) (R06 02)   7  Splenomegaly (789 2) (R16 1)   8  Strain of lumbar region, initial encounter (847 2) (S39 012A)    Current Meds   1  Aspirin 81 MG CAPS; Therapy: (Recorded:[id number]) to Recorded   2  Ergocalciferol 37565 UNIT CAPS; Therapy: (Recorded:73Xwk8191) to Recorded   3  Ferrous Sulfate 325 (65 Fe) MG Oral Tablet; Therapy: (Recorded:21Bdq6508) to Recorded   4  One Daily Mens TABS; Therapy: (Recorded:81Afu4302) to Recorded   5  Pantoprazole Sodium 40 MG Oral Tablet Delayed Release; Therapy: (Recorded:48Gos2060) to Recorded   6  PredniSONE 20 MG Oral Tablet; TAKE 1 TABLET DAILY; Therapy: (Derl Tricia) to Recorded   7  Vitamin B-12 1000 MCG Oral Tablet; Therapy: (Recorded:86Ksb0592) to Recorded   8  Zithromax Z-Pablo 250 MG Oral Tablet (Azithromycin); TAKE 2 TABLETS ON DAY 1 THEN   TAKE 1 TABLET A DAY FOR 4 DAYS; Therapy: [id number] to (Last Rx:42Nvf1260)  Requested for: 90Wwd5970 Ordered    Allergies    1   No Known Drug Allergies    Signatures   Electronically signed by : Betzaida Tyler, ; Sep  8 2017 10:17AM EST                       (Author)
pt denies

## 2018-11-05 ENCOUNTER — APPOINTMENT (OUTPATIENT)
Dept: LAB | Facility: MEDICAL CENTER | Age: 63
End: 2018-11-05
Payer: COMMERCIAL

## 2018-11-05 ENCOUNTER — TRANSCRIBE ORDERS (OUTPATIENT)
Dept: ADMINISTRATIVE | Facility: HOSPITAL | Age: 63
End: 2018-11-05

## 2018-11-05 DIAGNOSIS — D59.19 ANTIBODY-MEDIATED ANEMIA (HCC): Primary | ICD-10-CM

## 2018-11-05 DIAGNOSIS — D59.11 HEMOLYTIC ANEMIA DUE TO WARM ANTIBODY (HCC): ICD-10-CM

## 2018-11-05 DIAGNOSIS — E83.111 HEMOCHROMATOSIS AFTER MULTIPLE RED BLOOD CELL TRANSFUSIONS: Primary | ICD-10-CM

## 2018-11-05 LAB
BASOPHILS # BLD AUTO: 0.04 THOUSANDS/ΜL (ref 0–0.1)
BASOPHILS NFR BLD AUTO: 0 % (ref 0–1)
EOSINOPHIL # BLD AUTO: 0.24 THOUSAND/ΜL (ref 0–0.61)
EOSINOPHIL NFR BLD AUTO: 2 % (ref 0–6)
ERYTHROCYTE [DISTWIDTH] IN BLOOD BY AUTOMATED COUNT: 15.2 % (ref 11.6–15.1)
HCT VFR BLD AUTO: 36.4 % (ref 36.5–49.3)
HGB BLD-MCNC: 13.4 G/DL (ref 12–17)
IMM GRANULOCYTES # BLD AUTO: 0.08 THOUSAND/UL (ref 0–0.2)
IMM GRANULOCYTES NFR BLD AUTO: 1 % (ref 0–2)
LYMPHOCYTES # BLD AUTO: 1.53 THOUSANDS/ΜL (ref 0.6–4.47)
LYMPHOCYTES NFR BLD AUTO: 13 % (ref 14–44)
MCH RBC QN AUTO: 46.2 PG (ref 26.8–34.3)
MCHC RBC AUTO-ENTMCNC: 36.8 G/DL (ref 31.4–37.4)
MCV RBC AUTO: 126 FL (ref 82–98)
MONOCYTES # BLD AUTO: 0.77 THOUSAND/ΜL (ref 0.17–1.22)
MONOCYTES NFR BLD AUTO: 7 % (ref 4–12)
NEUTROPHILS # BLD AUTO: 9.22 THOUSANDS/ΜL (ref 1.85–7.62)
NEUTS SEG NFR BLD AUTO: 77 % (ref 43–75)
NRBC BLD AUTO-RTO: 0 /100 WBCS
PLATELET # BLD AUTO: 413 THOUSANDS/UL (ref 149–390)
PMV BLD AUTO: 10.2 FL (ref 8.9–12.7)
RBC # BLD AUTO: 2.9 MILLION/UL (ref 3.88–5.62)
WBC # BLD AUTO: 11.88 THOUSAND/UL (ref 4.31–10.16)

## 2018-11-05 PROCEDURE — 36415 COLL VENOUS BLD VENIPUNCTURE: CPT

## 2018-11-05 PROCEDURE — 85025 COMPLETE CBC W/AUTO DIFF WBC: CPT

## 2018-11-12 ENCOUNTER — TELEPHONE (OUTPATIENT)
Dept: HEMATOLOGY ONCOLOGY | Facility: CLINIC | Age: 63
End: 2018-11-12

## 2018-11-12 ENCOUNTER — APPOINTMENT (OUTPATIENT)
Dept: LAB | Facility: MEDICAL CENTER | Age: 63
End: 2018-11-12
Payer: COMMERCIAL

## 2018-11-12 DIAGNOSIS — E83.111 HEMOCHROMATOSIS AFTER MULTIPLE RED BLOOD CELL TRANSFUSIONS: ICD-10-CM

## 2018-11-12 DIAGNOSIS — D59.11 HEMOLYTIC ANEMIA DUE TO WARM ANTIBODY (HCC): ICD-10-CM

## 2018-11-12 LAB
BASOPHILS # BLD AUTO: 0.08 THOUSANDS/ΜL (ref 0–0.1)
BASOPHILS NFR BLD AUTO: 1 % (ref 0–1)
EOSINOPHIL # BLD AUTO: 0.45 THOUSAND/ΜL (ref 0–0.61)
EOSINOPHIL NFR BLD AUTO: 4 % (ref 0–6)
ERYTHROCYTE [DISTWIDTH] IN BLOOD BY AUTOMATED COUNT: 15.1 % (ref 11.6–15.1)
FERRITIN SERPL-MCNC: 2613 NG/ML (ref 8–388)
HCT VFR BLD AUTO: 36.7 % (ref 36.5–49.3)
HGB BLD-MCNC: 13.5 G/DL (ref 12–17)
IMM GRANULOCYTES # BLD AUTO: 0.08 THOUSAND/UL (ref 0–0.2)
IMM GRANULOCYTES NFR BLD AUTO: 1 % (ref 0–2)
IRON SATN MFR SERPL: 38 %
IRON SERPL-MCNC: 197 UG/DL (ref 65–175)
LYMPHOCYTES # BLD AUTO: 3.23 THOUSANDS/ΜL (ref 0.6–4.47)
LYMPHOCYTES NFR BLD AUTO: 26 % (ref 14–44)
MCH RBC QN AUTO: 46.6 PG (ref 26.8–34.3)
MCHC RBC AUTO-ENTMCNC: 36.8 G/DL (ref 31.4–37.4)
MCV RBC AUTO: 127 FL (ref 82–98)
MONOCYTES # BLD AUTO: 1.01 THOUSAND/ΜL (ref 0.17–1.22)
MONOCYTES NFR BLD AUTO: 8 % (ref 4–12)
NEUTROPHILS # BLD AUTO: 7.83 THOUSANDS/ΜL (ref 1.85–7.62)
NEUTS SEG NFR BLD AUTO: 60 % (ref 43–75)
NRBC BLD AUTO-RTO: 0 /100 WBCS
PLATELET # BLD AUTO: 392 THOUSANDS/UL (ref 149–390)
PMV BLD AUTO: 10.5 FL (ref 8.9–12.7)
RBC # BLD AUTO: 2.9 MILLION/UL (ref 3.88–5.62)
TIBC SERPL-MCNC: 516 UG/DL (ref 250–450)
WBC # BLD AUTO: 12.68 THOUSAND/UL (ref 4.31–10.16)

## 2018-11-12 PROCEDURE — 82728 ASSAY OF FERRITIN: CPT

## 2018-11-12 PROCEDURE — 85025 COMPLETE CBC W/AUTO DIFF WBC: CPT

## 2018-11-12 PROCEDURE — 36415 COLL VENOUS BLD VENIPUNCTURE: CPT

## 2018-11-12 PROCEDURE — 83540 ASSAY OF IRON: CPT

## 2018-11-12 PROCEDURE — 83550 IRON BINDING TEST: CPT

## 2018-11-19 ENCOUNTER — APPOINTMENT (OUTPATIENT)
Dept: LAB | Facility: HOSPITAL | Age: 63
End: 2018-11-19
Payer: COMMERCIAL

## 2018-11-19 ENCOUNTER — OFFICE VISIT (OUTPATIENT)
Dept: HEMATOLOGY ONCOLOGY | Facility: CLINIC | Age: 63
End: 2018-11-19
Payer: COMMERCIAL

## 2018-11-19 VITALS
HEART RATE: 106 BPM | BODY MASS INDEX: 31.04 KG/M2 | SYSTOLIC BLOOD PRESSURE: 138 MMHG | HEIGHT: 68 IN | TEMPERATURE: 97.7 F | WEIGHT: 204.8 LBS | OXYGEN SATURATION: 96 % | RESPIRATION RATE: 17 BRPM | DIASTOLIC BLOOD PRESSURE: 76 MMHG

## 2018-11-19 DIAGNOSIS — E83.111 HEMOCHROMATOSIS AFTER MULTIPLE RED BLOOD CELL TRANSFUSIONS: ICD-10-CM

## 2018-11-19 DIAGNOSIS — D59.11 HEMOLYTIC ANEMIA DUE TO WARM ANTIBODY (HCC): Primary | ICD-10-CM

## 2018-11-19 DIAGNOSIS — K21.9 GASTROESOPHAGEAL REFLUX DISEASE, ESOPHAGITIS PRESENCE NOT SPECIFIED: ICD-10-CM

## 2018-11-19 LAB
BASOPHILS # BLD AUTO: 0 THOUSANDS/ΜL (ref 0–0.1)
BASOPHILS NFR BLD AUTO: 0 % (ref 0–1)
EOSINOPHIL # BLD AUTO: 0.35 THOUSAND/ΜL (ref 0–0.61)
EOSINOPHIL NFR BLD AUTO: 2 % (ref 0–6)
ERYTHROCYTE [DISTWIDTH] IN BLOOD BY AUTOMATED COUNT: 14.2 % (ref 11.6–15.1)
HCT VFR BLD AUTO: 34.5 % (ref 36.5–49.3)
HGB BLD-MCNC: 11.5 G/DL (ref 12–17)
IMM GRANULOCYTES # BLD AUTO: 0.15 THOUSAND/UL (ref 0–0.2)
IMM GRANULOCYTES NFR BLD AUTO: 1 % (ref 0–2)
LYMPHOCYTES # BLD AUTO: 5.4 THOUSANDS/ΜL (ref 0.6–4.47)
LYMPHOCYTES NFR BLD AUTO: 24 % (ref 14–44)
MCH RBC QN AUTO: 40.4 PG (ref 26.8–34.3)
MCHC RBC AUTO-ENTMCNC: 33.3 G/DL (ref 31.4–37.4)
MCV RBC AUTO: 121 FL (ref 82–98)
MONOCYTES # BLD AUTO: 1.9 THOUSAND/ΜL (ref 0.17–1.22)
MONOCYTES NFR BLD AUTO: 8 % (ref 4–12)
NEUTROPHILS # BLD AUTO: 15.2 THOUSANDS/ΜL (ref 1.85–7.62)
NEUTS SEG NFR BLD AUTO: 65 % (ref 43–75)
NRBC BLD AUTO-RTO: 0 /100 WBCS
PLATELET # BLD AUTO: 450 THOUSANDS/UL (ref 149–390)
PMV BLD AUTO: 10.8 FL (ref 8.9–12.7)
RBC # BLD AUTO: 2.85 MILLION/UL (ref 3.88–5.62)
WBC # BLD AUTO: 23 THOUSAND/UL (ref 4.31–10.16)

## 2018-11-19 PROCEDURE — 99215 OFFICE O/P EST HI 40 MIN: CPT | Performed by: INTERNAL MEDICINE

## 2018-11-19 PROCEDURE — 36415 COLL VENOUS BLD VENIPUNCTURE: CPT | Performed by: PHYSICIAN ASSISTANT

## 2018-11-19 PROCEDURE — 85025 COMPLETE CBC W/AUTO DIFF WBC: CPT | Performed by: PHYSICIAN ASSISTANT

## 2018-11-19 RX ORDER — PANTOPRAZOLE SODIUM 40 MG/1
40 TABLET, DELAYED RELEASE ORAL
Qty: 30 TABLET | Refills: 0 | Status: SHIPPED | OUTPATIENT
Start: 2018-11-19 | End: 2018-12-17 | Stop reason: SDUPTHER

## 2018-11-19 NOTE — PROGRESS NOTES
[facility] HEMATOLOGY ONCOLOGY Noemy Yusuf [address] [address] [phone]    Frank Yi,1955, [id number]  11/19/18    Discussion:   In summary, this is a 26-year-old male history of autoimmune hemolytic anemia  He has been on prednisone gradually tapering  Generally hemoglobins have been in the normal range  Occasionally he will drop below the normal range and is able to recapture disease control with steroid bump  Interestingly, he is eligible for research trial at the [address] [place] [address] but requires him to be anemic  Understandably, he is somewhat hesitant to discontinue steroids, allow his anemia to evolve, and start on the research trial   He was assured that there are safety mechanisms to minimize risk) transfusions, etc)  Additionally, ferritin has decreased from 9927-2888  He has had increase in fatigue as well as moderate to severe headaches on a daily basis since starting Jaydenu  I asked him to suspend this medication and we will re-evaluate in 1 months time  I discussed the above with the patient  The patient  voiced understanding and agreement   ______________________________________________________________________    Chief Complaint   Patient presents with    Follow-up       HPI:   No history exists  Interval History:  Clinically stable  1 - Symptomatic but completely ambulatory    Review of Systems   Constitutional: Negative for chills and fever  HENT: Negative for nosebleeds  Eyes: Negative for discharge  Respiratory: Negative for cough and shortness of breath  Cardiovascular: Negative for chest pain  Gastrointestinal: Negative for abdominal pain, constipation and diarrhea  Endocrine: Negative for polydipsia  Genitourinary: Negative for hematuria  Musculoskeletal: Negative for arthralgias  Skin: Negative for color change  Allergic/Immunologic: Negative for immunocompromised state  Neurological: Negative for dizziness and headaches  Hematological: Negative for adenopathy  Psychiatric/Behavioral: Negative for agitation         Past Medical History:   Diagnosis Date    Autoimmune hemolytic anemia (HCC)     LOWE (dyspnea on exertion)     last assessed 11/02/2016    DVT (deep venous thrombosis) (HCC)     GERD (gastroesophageal reflux disease)     Hemolytic anemia (HCC)     Palpitation     Portal vein thrombosis     Pulmonary emboli (HCC)     Tobacco abuse      Patient Active Problem List   Diagnosis    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)    Hyperbilirubinemia    Symptomatic anemia    Pulmonary embolism (HCC)    Portal vein thrombosis    Elevated blood pressure reading without diagnosis of hypertension    GERD (gastroesophageal reflux disease)    Autoimmune hemolytic anemia (HCC)    Splenomegaly    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       Current Outpatient Prescriptions:     cyanocobalamin (VITAMIN B-12) 1,000 mcg tablet, Take 1,000 mcg by mouth daily, Disp: , Rfl:     dabigatran etexilate (PRADAXA) 150 mg capsu, Take 1 capsule (150 mg total) by mouth 2 (two) times a day, Disp: 60 capsule, Rfl: 5    Deferasirox 360 MG TABS, Take 3 tablets by mouth daily, Disp: 90 tablet, Rfl: 3    ergocalciferol (VITAMIN D2) 50,000 units, Take 50,000 Units by mouth once a week  , Disp: , Rfl:     LORazepam (ATIVAN) 0 5 mg tablet, Take 1 tablet (0 5 mg total) by mouth every 6 (six) hours as needed for anxiety, Disp: 30 tablet, Rfl: 1    Multiple Vitamins-Minerals (ONE DAILY MENS) TABS, Take by mouth EVERY OTHER DAY , Disp: , Rfl:     pantoprazole (PROTONIX) 40 mg tablet, Take 1 tablet (40 mg total) by mouth daily in the early morning, Disp: 30 tablet, Rfl: 2    predniSONE 20 mg tablet, Take 3 tablets (60 mg total) by mouth daily Or as directed with food (Patient taking differently: Take 20 mg by mouth daily Or as directed with food ), Disp: 90 tablet, Rfl: 1    aspirin 81 MG tablet, Take 81 mg by mouth daily, Disp: , Rfl:   Allergies   Allergen Reactions    Iodinated Diagnostic Agents Hives     Past Surgical History:   Procedure Laterality Date    ELBOW ARTHROPLASTY Left     bursectomy    KNEE SURGERY Right     meniscus tear    AK LAP,CHOLECYSTECTOMY/GRAPH N/A 12/23/2017    Procedure: CHOLECYSTECTOMY LAPAROSCOPIC with cholangiogram;  Surgeon: Austin Chester MD;  [facility];  Service: General    AK REMOVAL SPLEEN, TOTAL N/A 5/18/2017    Procedure: LAPAROSCOPIC HAND ASSIST SPLENECTOMY;  Surgeon: Coy Holt MD;  [facility];  Service: Surgical Oncology    SHOULDER SURGERY Left     rotator cuff x4, reconstruction     Social History     Objective:  Vitals:    11/19/18 0800   BP: 138/76   BP Location: Left arm   Patient Position: Sitting   Pulse: (!) 106   Resp: 17   Temp: 97 7 °F (36 5 °C)   TempSrc: Tympanic   SpO2: 96%   Weight: 92 9 kg (204 lb 12 8 oz)   Height: 5' 8" (1 727 m)     Physical Exam   Constitutional: He is oriented to person, place, and time  He appears well-developed  HENT:   Head: Normocephalic  Eyes: Pupils are equal, round, and reactive to light  Neck: Neck supple  Cardiovascular: Normal rate and regular rhythm  No murmur heard  Pulmonary/Chest: Breath sounds normal  He has no wheezes  He has no rales  Abdominal: Soft  There is no tenderness  Musculoskeletal: Normal range of motion  He exhibits no edema or tenderness  Lymphadenopathy:     He has no cervical adenopathy  Neurological: He is alert and oriented to person, place, and time  He has normal reflexes  No cranial nerve deficit  Skin: No rash noted  No erythema  Psychiatric: He has a normal mood and affect  His behavior is normal          Labs: I personally reviewed the labs and imaging pertinent to this patient care

## 2018-11-26 ENCOUNTER — APPOINTMENT (OUTPATIENT)
Dept: LAB | Facility: MEDICAL CENTER | Age: 63
End: 2018-11-26
Payer: COMMERCIAL

## 2018-11-26 ENCOUNTER — TELEPHONE (OUTPATIENT)
Dept: HEMATOLOGY ONCOLOGY | Facility: CLINIC | Age: 63
End: 2018-11-26

## 2018-11-26 NOTE — TELEPHONE ENCOUNTER
Spoke with patient - labs reviewed   hgb = 9 4 - Patient took it upon himself to increase prednisone to 30mg PO daily "a few days go"  Per Dr Paula Levin patient is to increase prednisone to 60mg PO daily and re check CBC in 1 week    Patient agreeable and verbalized understanding

## 2018-12-03 ENCOUNTER — OFFICE VISIT (OUTPATIENT)
Dept: FAMILY MEDICINE CLINIC | Facility: CLINIC | Age: 63
End: 2018-12-03
Payer: COMMERCIAL

## 2018-12-03 ENCOUNTER — APPOINTMENT (OUTPATIENT)
Dept: LAB | Facility: MEDICAL CENTER | Age: 63
End: 2018-12-03
Payer: COMMERCIAL

## 2018-12-03 VITALS
DIASTOLIC BLOOD PRESSURE: 72 MMHG | TEMPERATURE: 97.8 F | SYSTOLIC BLOOD PRESSURE: 126 MMHG | BODY MASS INDEX: 31.37 KG/M2 | OXYGEN SATURATION: 96 % | WEIGHT: 207 LBS | RESPIRATION RATE: 18 BRPM | HEART RATE: 80 BPM | HEIGHT: 68 IN

## 2018-12-03 DIAGNOSIS — J20.9 ACUTE BRONCHITIS, UNSPECIFIED ORGANISM: Primary | ICD-10-CM

## 2018-12-03 DIAGNOSIS — D59.19 ANTIBODY-MEDIATED ANEMIA (HCC): ICD-10-CM

## 2018-12-03 LAB
BASOPHILS # BLD AUTO: 0.02 THOUSANDS/ΜL (ref 0–0.1)
BASOPHILS NFR BLD AUTO: 0 % (ref 0–1)
EOSINOPHIL # BLD AUTO: 0.07 THOUSAND/ΜL (ref 0–0.61)
EOSINOPHIL NFR BLD AUTO: 0 % (ref 0–6)
ERYTHROCYTE [DISTWIDTH] IN BLOOD BY AUTOMATED COUNT: 22.4 % (ref 11.6–15.1)
HCT VFR BLD AUTO: 30.6 % (ref 36.5–49.3)
HGB BLD-MCNC: 11.3 G/DL (ref 12–17)
IMM GRANULOCYTES # BLD AUTO: 0.1 THOUSAND/UL (ref 0–0.2)
IMM GRANULOCYTES NFR BLD AUTO: 1 % (ref 0–2)
LYMPHOCYTES # BLD AUTO: 2.28 THOUSANDS/ΜL (ref 0.6–4.47)
LYMPHOCYTES NFR BLD AUTO: 14 % (ref 14–44)
MCH RBC QN AUTO: 49.3 PG (ref 26.8–34.3)
MCHC RBC AUTO-ENTMCNC: 36.9 G/DL (ref 31.4–37.4)
MCV RBC AUTO: 134 FL (ref 82–98)
MONOCYTES # BLD AUTO: 1.65 THOUSAND/ΜL (ref 0.17–1.22)
MONOCYTES NFR BLD AUTO: 10 % (ref 4–12)
NEUTROPHILS # BLD AUTO: 11.85 THOUSANDS/ΜL (ref 1.85–7.62)
NEUTS SEG NFR BLD AUTO: 75 % (ref 43–75)
NRBC BLD AUTO-RTO: 4 /100 WBCS
PLATELET # BLD AUTO: 460 THOUSANDS/UL (ref 149–390)
PMV BLD AUTO: 10 FL (ref 8.9–12.7)
RBC # BLD AUTO: 2.29 MILLION/UL (ref 3.88–5.62)
WBC # BLD AUTO: 15.97 THOUSAND/UL (ref 4.31–10.16)

## 2018-12-03 PROCEDURE — 36415 COLL VENOUS BLD VENIPUNCTURE: CPT

## 2018-12-03 PROCEDURE — 85025 COMPLETE CBC W/AUTO DIFF WBC: CPT

## 2018-12-03 PROCEDURE — 99213 OFFICE O/P EST LOW 20 MIN: CPT | Performed by: FAMILY MEDICINE

## 2018-12-03 PROCEDURE — 3008F BODY MASS INDEX DOCD: CPT | Performed by: FAMILY MEDICINE

## 2018-12-03 RX ORDER — LEVOFLOXACIN 500 MG/1
500 TABLET, FILM COATED ORAL EVERY 24 HOURS
Qty: 7 TABLET | Refills: 0 | Status: SHIPPED | OUTPATIENT
Start: 2018-12-03 | End: 2018-12-10

## 2018-12-03 NOTE — PROGRESS NOTES
Assessment/Plan:    Patient will be started on Levaquin 500 mg 1 daily for 7 days and Mucinex DM p r n  He is encouraged to drink plenty of fluids and rest   Return to the office in 1 week or sooner p r n  Caridad Flores Diagnoses and all orders for this visit:    Acute bronchitis, unspecified organism  Comments:  Levaquin 500 mg 1 daily for 7 days  Mucinex DM p r n   Increase fluids and rest   Orders:  -     levofloxacin (LEVAQUIN) 500 mg tablet; Take 1 tablet (500 mg total) by mouth every 24 hours for 7 days          Subjective:      Patient ID: Rickey Alexander is a 58 y o  male  Patient complains of cold symptoms for the past 2 weeks  He complains of nasal congestion productive of green mucus and cough  He admits to headache but denies any fever  Patient had his prednisone increased last week to 60 mg daily per Hematology  He has been treating this with Mucinex with some relief  URI    This is a recurrent problem  The current episode started 1 to 4 weeks ago  The problem has been gradually worsening  There has been no fever  Associated symptoms include congestion, coughing, headaches, rhinorrhea and wheezing  Pertinent negatives include no diarrhea, ear pain, nausea, sinus pain, sneezing, sore throat or vomiting  He has tried decongestant (mucinex) for the symptoms  The treatment provided mild relief  The following portions of the patient's history were reviewed and updated as appropriate: allergies, current medications, past family history, past medical history, past social history, past surgical history and problem list     Review of Systems   HENT: Positive for congestion and rhinorrhea  Negative for ear pain, sinus pain, sneezing and sore throat  Respiratory: Positive for cough and wheezing  Gastrointestinal: Negative for diarrhea, nausea and vomiting  Neurological: Positive for headaches           Objective:      /72 (BP Location: Left arm, Patient Position: Sitting, Cuff Size: Standard)   Pulse 80   Temp 97 8 °F (36 6 °C) (Tympanic)   Resp 18   Ht 5' 8" (1 727 m)   Wt 93 9 kg (207 lb)   SpO2 96%   BMI 31 47 kg/m²          Physical Exam   Constitutional: He is oriented to person, place, and time  He appears well-developed and well-nourished  No distress  HENT:   Head: Normocephalic  Right Ear: External ear normal    Left Ear: External ear normal    Positive turbinates swelling with mucoid purulent drainage  Throat postnasal drainage and injected  Mucous membranes moist    Eyes: Conjunctivae are normal  No scleral icterus  Neck: Neck supple  Cardiovascular: Normal rate and regular rhythm  Pulmonary/Chest: Effort normal and breath sounds normal  No respiratory distress  He has no wheezes  Abdominal: Soft  There is no tenderness  Musculoskeletal: He exhibits no edema  Lymphadenopathy:     He has no cervical adenopathy  Neurological: He is alert and oriented to person, place, and time  Skin: Skin is warm and dry  Psychiatric: He has a normal mood and affect

## 2018-12-04 ENCOUNTER — TELEPHONE (OUTPATIENT)
Dept: HEMATOLOGY ONCOLOGY | Facility: CLINIC | Age: 63
End: 2018-12-04

## 2018-12-05 DIAGNOSIS — D59.10 AUTOIMMUNE HEMOLYTIC ANEMIA (HCC): Primary | ICD-10-CM

## 2018-12-10 ENCOUNTER — APPOINTMENT (OUTPATIENT)
Dept: LAB | Facility: MEDICAL CENTER | Age: 63
End: 2018-12-10
Payer: COMMERCIAL

## 2018-12-10 LAB
BASOPHILS # BLD AUTO: 0.01 THOUSANDS/ΜL (ref 0–0.1)
BASOPHILS NFR BLD AUTO: 0 % (ref 0–1)
EOSINOPHIL # BLD AUTO: 0.06 THOUSAND/ΜL (ref 0–0.61)
EOSINOPHIL NFR BLD AUTO: 0 % (ref 0–6)
ERYTHROCYTE [DISTWIDTH] IN BLOOD BY AUTOMATED COUNT: 17 % (ref 11.6–15.1)
HCT VFR BLD AUTO: 32.3 % (ref 36.5–49.3)
HGB BLD-MCNC: 11.8 G/DL (ref 12–17)
IMM GRANULOCYTES # BLD AUTO: 0.09 THOUSAND/UL (ref 0–0.2)
IMM GRANULOCYTES NFR BLD AUTO: 1 % (ref 0–2)
LYMPHOCYTES # BLD AUTO: 1.85 THOUSANDS/ΜL (ref 0.6–4.47)
LYMPHOCYTES NFR BLD AUTO: 12 % (ref 14–44)
MCH RBC QN AUTO: 46.6 PG (ref 26.8–34.3)
MCHC RBC AUTO-ENTMCNC: 36.5 G/DL (ref 31.4–37.4)
MCV RBC AUTO: 128 FL (ref 82–98)
MONOCYTES # BLD AUTO: 0.8 THOUSAND/ΜL (ref 0.17–1.22)
MONOCYTES NFR BLD AUTO: 5 % (ref 4–12)
NEUTROPHILS # BLD AUTO: 12.61 THOUSANDS/ΜL (ref 1.85–7.62)
NEUTS SEG NFR BLD AUTO: 82 % (ref 43–75)
NRBC BLD AUTO-RTO: 0 /100 WBCS
PLATELET # BLD AUTO: 524 THOUSANDS/UL (ref 149–390)
PMV BLD AUTO: 10 FL (ref 8.9–12.7)
RBC # BLD AUTO: 2.53 MILLION/UL (ref 3.88–5.62)
WBC # BLD AUTO: 15.42 THOUSAND/UL (ref 4.31–10.16)

## 2018-12-10 PROCEDURE — 36415 COLL VENOUS BLD VENIPUNCTURE: CPT

## 2018-12-10 PROCEDURE — 85025 COMPLETE CBC W/AUTO DIFF WBC: CPT

## 2018-12-16 DIAGNOSIS — D59.10 ACQUIRED AUTOIMMUNE HEMOLYTIC ANEMIA (HCC): ICD-10-CM

## 2018-12-17 ENCOUNTER — TELEPHONE (OUTPATIENT)
Dept: HEMATOLOGY ONCOLOGY | Facility: CLINIC | Age: 63
End: 2018-12-17

## 2018-12-17 ENCOUNTER — APPOINTMENT (OUTPATIENT)
Dept: LAB | Facility: MEDICAL CENTER | Age: 63
End: 2018-12-17
Payer: COMMERCIAL

## 2018-12-17 DIAGNOSIS — D59.10 AUTOIMMUNE HEMOLYTIC ANEMIA (HCC): Primary | ICD-10-CM

## 2018-12-17 DIAGNOSIS — K21.9 GASTROESOPHAGEAL REFLUX DISEASE, ESOPHAGITIS PRESENCE NOT SPECIFIED: ICD-10-CM

## 2018-12-17 RX ORDER — PANTOPRAZOLE SODIUM 40 MG/1
40 TABLET, DELAYED RELEASE ORAL
Qty: 30 TABLET | Refills: 3 | Status: SHIPPED | OUTPATIENT
Start: 2018-12-17 | End: 2019-04-17 | Stop reason: SDUPTHER

## 2018-12-17 RX ORDER — PREDNISONE 20 MG/1
TABLET ORAL
Qty: 90 TABLET | Refills: 0 | Status: SHIPPED | OUTPATIENT
Start: 2018-12-17 | End: 2019-02-28 | Stop reason: ALTCHOICE

## 2018-12-17 RX ORDER — PREDNISONE 10 MG/1
TABLET ORAL
Qty: 180 TABLET | Refills: 0 | Status: SHIPPED | OUTPATIENT
Start: 2018-12-17 | End: 2019-03-26 | Stop reason: SDUPTHER

## 2018-12-24 ENCOUNTER — APPOINTMENT (OUTPATIENT)
Dept: LAB | Facility: MEDICAL CENTER | Age: 63
End: 2018-12-24
Payer: COMMERCIAL

## 2018-12-31 ENCOUNTER — APPOINTMENT (OUTPATIENT)
Dept: LAB | Facility: MEDICAL CENTER | Age: 63
End: 2018-12-31
Payer: COMMERCIAL

## 2019-01-07 ENCOUNTER — APPOINTMENT (OUTPATIENT)
Dept: LAB | Facility: MEDICAL CENTER | Age: 64
End: 2019-01-07
Payer: COMMERCIAL

## 2019-01-07 DIAGNOSIS — D59.10 AUTOIMMUNE HEMOLYTIC ANEMIA (HCC): ICD-10-CM

## 2019-01-07 LAB
BASOPHILS # BLD AUTO: 0.05 THOUSANDS/ΜL (ref 0–0.1)
BASOPHILS NFR BLD AUTO: 1 % (ref 0–1)
EOSINOPHIL # BLD AUTO: 0.21 THOUSAND/ΜL (ref 0–0.61)
EOSINOPHIL NFR BLD AUTO: 3 % (ref 0–6)
ERYTHROCYTE [DISTWIDTH] IN BLOOD BY AUTOMATED COUNT: 14.8 % (ref 11.6–15.1)
HCT VFR BLD AUTO: 36.5 % (ref 36.5–49.3)
HGB BLD-MCNC: 13.1 G/DL (ref 12–17)
IMM GRANULOCYTES # BLD AUTO: 0.01 THOUSAND/UL (ref 0–0.2)
IMM GRANULOCYTES NFR BLD AUTO: 0 % (ref 0–2)
LYMPHOCYTES # BLD AUTO: 4.86 THOUSANDS/ΜL (ref 0.6–4.47)
LYMPHOCYTES NFR BLD AUTO: 71 % (ref 14–44)
MCH RBC QN AUTO: 43.4 PG (ref 26.8–34.3)
MCHC RBC AUTO-ENTMCNC: 35.9 G/DL (ref 31.4–37.4)
MCV RBC AUTO: 121 FL (ref 82–98)
MONOCYTES # BLD AUTO: 1.11 THOUSAND/ΜL (ref 0.17–1.22)
MONOCYTES NFR BLD AUTO: 16 % (ref 4–12)
NEUTROPHILS # BLD AUTO: 0.61 THOUSANDS/ΜL (ref 1.85–7.62)
NEUTS SEG NFR BLD AUTO: 9 % (ref 43–75)
NRBC BLD AUTO-RTO: 0 /100 WBCS
PLATELET # BLD AUTO: 462 THOUSANDS/UL (ref 149–390)
PMV BLD AUTO: 10.9 FL (ref 8.9–12.7)
RBC # BLD AUTO: 3.02 MILLION/UL (ref 3.88–5.62)
WBC # BLD AUTO: 6.85 THOUSAND/UL (ref 4.31–10.16)

## 2019-01-07 PROCEDURE — 36415 COLL VENOUS BLD VENIPUNCTURE: CPT

## 2019-01-07 PROCEDURE — 85025 COMPLETE CBC W/AUTO DIFF WBC: CPT

## 2019-01-09 ENCOUNTER — TELEPHONE (OUTPATIENT)
Dept: HEMATOLOGY ONCOLOGY | Facility: CLINIC | Age: 64
End: 2019-01-09

## 2019-01-09 DIAGNOSIS — I26.99 OTHER ACUTE PULMONARY EMBOLISM WITHOUT ACUTE COR PULMONALE (HCC): ICD-10-CM

## 2019-01-09 RX ORDER — DABIGATRAN ETEXILATE 150 MG/1
150 CAPSULE, COATED PELLETS ORAL 2 TIMES DAILY
Qty: 60 CAPSULE | Refills: 3 | Status: CANCELLED | OUTPATIENT
Start: 2019-01-09

## 2019-01-09 NOTE — TELEPHONE ENCOUNTER
Patient called because he needs a medication refill on his Pradaxa 150 mg sent to [facility] in [place]- [phone]    Patient can be reached at [phone]

## 2019-01-10 ENCOUNTER — TELEPHONE (OUTPATIENT)
Dept: HEMATOLOGY ONCOLOGY | Facility: CLINIC | Age: 64
End: 2019-01-10

## 2019-01-10 DIAGNOSIS — I26.99 OTHER ACUTE PULMONARY EMBOLISM WITHOUT ACUTE COR PULMONALE (HCC): ICD-10-CM

## 2019-01-10 RX ORDER — DABIGATRAN ETEXILATE 150 MG/1
150 CAPSULE, COATED PELLETS ORAL 2 TIMES DAILY
Qty: 60 CAPSULE | Refills: 3 | Status: SHIPPED | OUTPATIENT
Start: 2019-01-10 | End: 2019-05-14 | Stop reason: SDUPTHER

## 2019-01-10 NOTE — TELEPHONE ENCOUNTER
Patient needs a refill on his Pradaxa (dabigatran etexilate (PRADAXA) 150 mg capsule)  Patient doesn't have anymore pills left  Please refill and send to [facility] his primary pharmacy

## 2019-01-14 ENCOUNTER — APPOINTMENT (OUTPATIENT)
Dept: LAB | Facility: MEDICAL CENTER | Age: 64
End: 2019-01-14
Payer: COMMERCIAL

## 2019-01-14 DIAGNOSIS — D59.10 AUTOIMMUNE HEMOLYTIC ANEMIA (HCC): ICD-10-CM

## 2019-01-14 DIAGNOSIS — E83.19 IRON OVERLOAD: ICD-10-CM

## 2019-01-14 LAB
BASOPHILS # BLD AUTO: 0.08 THOUSANDS/ΜL (ref 0–0.1)
BASOPHILS NFR BLD AUTO: 1 % (ref 0–1)
EOSINOPHIL # BLD AUTO: 0.12 THOUSAND/ΜL (ref 0–0.61)
EOSINOPHIL NFR BLD AUTO: 1 % (ref 0–6)
HCT VFR BLD AUTO: 32.3 % (ref 36.5–49.3)
HGB BLD-MCNC: 12.1 G/DL (ref 12–17)
IMM GRANULOCYTES # BLD AUTO: 0.22 THOUSAND/UL (ref 0–0.2)
IMM GRANULOCYTES NFR BLD AUTO: 2 % (ref 0–2)
LYMPHOCYTES # BLD AUTO: 2.9 THOUSANDS/ΜL (ref 0.6–4.47)
LYMPHOCYTES NFR BLD AUTO: 20 % (ref 14–44)
MCH RBC QN AUTO: 45.5 PG (ref 26.8–34.3)
MCHC RBC AUTO-ENTMCNC: 37.5 G/DL (ref 31.4–37.4)
MCV RBC AUTO: 121 FL (ref 82–98)
MONOCYTES # BLD AUTO: 1.35 THOUSAND/ΜL (ref 0.17–1.22)
MONOCYTES NFR BLD AUTO: 9 % (ref 4–12)
NEUTROPHILS # BLD AUTO: 9.85 THOUSANDS/ΜL (ref 1.85–7.62)
NEUTS SEG NFR BLD AUTO: 67 % (ref 43–75)
NRBC BLD AUTO-RTO: 1 /100 WBCS
PLATELET # BLD AUTO: 475 THOUSANDS/UL (ref 149–390)
PMV BLD AUTO: 11.2 FL (ref 8.9–12.7)
RBC # BLD AUTO: 2.66 MILLION/UL (ref 3.88–5.62)
WBC # BLD AUTO: 14.52 THOUSAND/UL (ref 4.31–10.16)

## 2019-01-14 PROCEDURE — 85025 COMPLETE CBC W/AUTO DIFF WBC: CPT

## 2019-01-14 PROCEDURE — 36415 COLL VENOUS BLD VENIPUNCTURE: CPT

## 2019-01-15 RX ORDER — DEFERASIROX 360 MG/1
TABLET, FILM COATED ORAL
Qty: 45 TABLET | Refills: 5 | Status: SHIPPED | OUTPATIENT
Start: 2019-01-15 | End: 2019-04-11 | Stop reason: SDUPTHER

## 2019-01-21 ENCOUNTER — APPOINTMENT (OUTPATIENT)
Dept: LAB | Facility: MEDICAL CENTER | Age: 64
End: 2019-01-21
Payer: COMMERCIAL

## 2019-01-21 DIAGNOSIS — D59.10 AUTOIMMUNE HEMOLYTIC ANEMIA (HCC): ICD-10-CM

## 2019-01-21 LAB
ANISOCYTOSIS BLD QL SMEAR: PRESENT
BASOPHILS # BLD MANUAL: 0 THOUSAND/UL (ref 0–0.1)
BASOPHILS NFR MAR MANUAL: 0 % (ref 0–1)
EOSINOPHIL # BLD MANUAL: 0.19 THOUSAND/UL (ref 0–0.4)
EOSINOPHIL NFR BLD MANUAL: 1 % (ref 0–6)
ERYTHROCYTE [DISTWIDTH] IN BLOOD BY AUTOMATED COUNT: 20.8 % (ref 11.6–15.1)
HCT VFR BLD AUTO: 30.4 % (ref 36.5–49.3)
HGB BLD-MCNC: 11.2 G/DL (ref 12–17)
LYMPHOCYTES # BLD AUTO: 1.89 THOUSAND/UL (ref 0.6–4.47)
LYMPHOCYTES # BLD AUTO: 10 % (ref 14–44)
MACROCYTES BLD QL AUTO: PRESENT
MCH RBC QN AUTO: 46.7 PG (ref 26.8–34.3)
MCHC RBC AUTO-ENTMCNC: 36.8 G/DL (ref 31.4–37.4)
MCV RBC AUTO: 127 FL (ref 82–98)
MONOCYTES # BLD AUTO: 0.76 THOUSAND/UL (ref 0–1.22)
MONOCYTES NFR BLD: 4 % (ref 4–12)
NEUTROPHILS # BLD MANUAL: 16.09 THOUSAND/UL (ref 1.85–7.62)
NEUTS SEG NFR BLD AUTO: 85 % (ref 43–75)
NRBC BLD AUTO-RTO: 8 /100 WBC (ref 0–2)
NRBC BLD AUTO-RTO: 8 /100 WBCS
PLATELET # BLD AUTO: 475 THOUSANDS/UL (ref 149–390)
PLATELET BLD QL SMEAR: ABNORMAL
PMV BLD AUTO: 11.1 FL (ref 8.9–12.7)
POLYCHROMASIA BLD QL SMEAR: PRESENT
RBC # BLD AUTO: 2.4 MILLION/UL (ref 3.88–5.62)
RBC MORPH BLD: PRESENT
WBC # BLD AUTO: 18.93 THOUSAND/UL (ref 4.31–10.16)

## 2019-01-21 PROCEDURE — 36415 COLL VENOUS BLD VENIPUNCTURE: CPT

## 2019-01-21 PROCEDURE — 85027 COMPLETE CBC AUTOMATED: CPT

## 2019-01-21 PROCEDURE — 85007 BL SMEAR W/DIFF WBC COUNT: CPT

## 2019-01-28 ENCOUNTER — TELEPHONE (OUTPATIENT)
Dept: HEMATOLOGY ONCOLOGY | Facility: CLINIC | Age: 64
End: 2019-01-28

## 2019-01-28 ENCOUNTER — APPOINTMENT (OUTPATIENT)
Dept: LAB | Facility: MEDICAL CENTER | Age: 64
End: 2019-01-28
Payer: COMMERCIAL

## 2019-01-28 DIAGNOSIS — D59.10 AUTOIMMUNE HEMOLYTIC ANEMIA (HCC): ICD-10-CM

## 2019-01-28 LAB
BASOPHILS # BLD AUTO: 0.02 THOUSANDS/ΜL (ref 0–0.1)
BASOPHILS NFR BLD AUTO: 0 % (ref 0–1)
EOSINOPHIL # BLD AUTO: 0.03 THOUSAND/ΜL (ref 0–0.61)
EOSINOPHIL NFR BLD AUTO: 0 % (ref 0–6)
ERYTHROCYTE [DISTWIDTH] IN BLOOD BY AUTOMATED COUNT: 20.8 % (ref 11.6–15.1)
HCT VFR BLD AUTO: 31.4 % (ref 36.5–49.3)
HGB BLD-MCNC: 11.5 G/DL (ref 12–17)
IMM GRANULOCYTES # BLD AUTO: 0.08 THOUSAND/UL (ref 0–0.2)
IMM GRANULOCYTES NFR BLD AUTO: 1 % (ref 0–2)
LYMPHOCYTES # BLD AUTO: 1.8 THOUSANDS/ΜL (ref 0.6–4.47)
LYMPHOCYTES NFR BLD AUTO: 12 % (ref 14–44)
MCH RBC QN AUTO: 45.6 PG (ref 26.8–34.3)
MCHC RBC AUTO-ENTMCNC: 36.6 G/DL (ref 31.4–37.4)
MCV RBC AUTO: 125 FL (ref 82–98)
MONOCYTES # BLD AUTO: 1.09 THOUSAND/ΜL (ref 0.17–1.22)
MONOCYTES NFR BLD AUTO: 8 % (ref 4–12)
NEUTROPHILS # BLD AUTO: 11.57 THOUSANDS/ΜL (ref 1.85–7.62)
NEUTS SEG NFR BLD AUTO: 79 % (ref 43–75)
NRBC BLD AUTO-RTO: 2 /100 WBCS
PLATELET # BLD AUTO: 442 THOUSANDS/UL (ref 149–390)
PMV BLD AUTO: 10.9 FL (ref 8.9–12.7)
RBC # BLD AUTO: 2.52 MILLION/UL (ref 3.88–5.62)
WBC # BLD AUTO: 14.59 THOUSAND/UL (ref 4.31–10.16)

## 2019-01-28 PROCEDURE — 36415 COLL VENOUS BLD VENIPUNCTURE: CPT

## 2019-01-28 PROCEDURE — 85025 COMPLETE CBC W/AUTO DIFF WBC: CPT

## 2019-01-28 NOTE — TELEPHONE ENCOUNTER
Patient would like to know if his CBC from this morning came in, and if so can someone give him a call with his hemoglobin results        Patient can be reached at [phone]

## 2019-02-04 ENCOUNTER — TELEPHONE (OUTPATIENT)
Dept: HEMATOLOGY ONCOLOGY | Facility: CLINIC | Age: 64
End: 2019-02-04

## 2019-02-04 ENCOUNTER — APPOINTMENT (OUTPATIENT)
Dept: LAB | Facility: MEDICAL CENTER | Age: 64
End: 2019-02-04
Payer: COMMERCIAL

## 2019-02-04 DIAGNOSIS — D59.10 AUTOIMMUNE HEMOLYTIC ANEMIA (HCC): ICD-10-CM

## 2019-02-04 LAB
BASOPHILS # BLD AUTO: 0.03 THOUSANDS/ΜL (ref 0–0.1)
BASOPHILS NFR BLD AUTO: 0 % (ref 0–1)
EOSINOPHIL # BLD AUTO: 0.16 THOUSAND/ΜL (ref 0–0.61)
EOSINOPHIL NFR BLD AUTO: 1 % (ref 0–6)
ERYTHROCYTE [DISTWIDTH] IN BLOOD BY AUTOMATED COUNT: 18 % (ref 11.6–15.1)
HCT VFR BLD AUTO: 35.9 % (ref 36.5–49.3)
HGB BLD-MCNC: 12.9 G/DL (ref 12–17)
IMM GRANULOCYTES # BLD AUTO: 0.11 THOUSAND/UL (ref 0–0.2)
IMM GRANULOCYTES NFR BLD AUTO: 1 % (ref 0–2)
LYMPHOCYTES # BLD AUTO: 1.63 THOUSANDS/ΜL (ref 0.6–4.47)
LYMPHOCYTES NFR BLD AUTO: 10 % (ref 14–44)
MCH RBC QN AUTO: 45.1 PG (ref 26.8–34.3)
MCHC RBC AUTO-ENTMCNC: 35.9 G/DL (ref 31.4–37.4)
MCV RBC AUTO: 126 FL (ref 82–98)
MONOCYTES # BLD AUTO: 1.1 THOUSAND/ΜL (ref 0.17–1.22)
MONOCYTES NFR BLD AUTO: 7 % (ref 4–12)
NEUTROPHILS # BLD AUTO: 13.22 THOUSANDS/ΜL (ref 1.85–7.62)
NEUTS SEG NFR BLD AUTO: 81 % (ref 43–75)
NRBC BLD AUTO-RTO: 0 /100 WBCS
PLATELET # BLD AUTO: 426 THOUSANDS/UL (ref 149–390)
PMV BLD AUTO: 10.5 FL (ref 8.9–12.7)
RBC # BLD AUTO: 2.86 MILLION/UL (ref 3.88–5.62)
WBC # BLD AUTO: 16.25 THOUSAND/UL (ref 4.31–10.16)

## 2019-02-04 PROCEDURE — 36415 COLL VENOUS BLD VENIPUNCTURE: CPT

## 2019-02-04 PROCEDURE — 85025 COMPLETE CBC W/AUTO DIFF WBC: CPT

## 2019-02-07 ENCOUNTER — TELEPHONE (OUTPATIENT)
Dept: HEMATOLOGY ONCOLOGY | Facility: CLINIC | Age: 64
End: 2019-02-07

## 2019-02-07 NOTE — TELEPHONE ENCOUNTER
Spoke with patient - extra supply of prednisone will be called to pharmacy    Prednisone 10mg  #10  5 tablets by mouth daily    0 refill

## 2019-02-07 NOTE — TELEPHONE ENCOUNTER
Patient states he is out of town and does not have enough medication with him    He asked if 6 of his 10mg prednisone could be called into [facility] in [place], [place]    Patient asked if he could get a call to confirm this is done at [phone]

## 2019-02-11 ENCOUNTER — TELEPHONE (OUTPATIENT)
Dept: HEMATOLOGY ONCOLOGY | Facility: CLINIC | Age: 64
End: 2019-02-11

## 2019-02-11 ENCOUNTER — APPOINTMENT (OUTPATIENT)
Dept: LAB | Facility: MEDICAL CENTER | Age: 64
End: 2019-02-11
Payer: COMMERCIAL

## 2019-02-11 DIAGNOSIS — D59.10 AUTOIMMUNE HEMOLYTIC ANEMIA (HCC): ICD-10-CM

## 2019-02-11 LAB
BASOPHILS # BLD AUTO: 0.02 THOUSANDS/ΜL (ref 0–0.1)
BASOPHILS NFR BLD AUTO: 0 % (ref 0–1)
EOSINOPHIL # BLD AUTO: 0.14 THOUSAND/ΜL (ref 0–0.61)
EOSINOPHIL NFR BLD AUTO: 1 % (ref 0–6)
ERYTHROCYTE [DISTWIDTH] IN BLOOD BY AUTOMATED COUNT: 15.9 % (ref 11.6–15.1)
HCT VFR BLD AUTO: 39.1 % (ref 36.5–49.3)
HGB BLD-MCNC: 13.9 G/DL (ref 12–17)
IMM GRANULOCYTES # BLD AUTO: 0.07 THOUSAND/UL (ref 0–0.2)
IMM GRANULOCYTES NFR BLD AUTO: 1 % (ref 0–2)
LYMPHOCYTES # BLD AUTO: 1.99 THOUSANDS/ΜL (ref 0.6–4.47)
LYMPHOCYTES NFR BLD AUTO: 16 % (ref 14–44)
MCH RBC QN AUTO: 43.3 PG (ref 26.8–34.3)
MCHC RBC AUTO-ENTMCNC: 35.5 G/DL (ref 31.4–37.4)
MCV RBC AUTO: 122 FL (ref 82–98)
MONOCYTES # BLD AUTO: 0.79 THOUSAND/ΜL (ref 0.17–1.22)
MONOCYTES NFR BLD AUTO: 6 % (ref 4–12)
NEUTROPHILS # BLD AUTO: 9.41 THOUSANDS/ΜL (ref 1.85–7.62)
NEUTS SEG NFR BLD AUTO: 76 % (ref 43–75)
NRBC BLD AUTO-RTO: 0 /100 WBCS
PLATELET # BLD AUTO: 338 THOUSANDS/UL (ref 149–390)
PMV BLD AUTO: 11.3 FL (ref 8.9–12.7)
RBC # BLD AUTO: 3.21 MILLION/UL (ref 3.88–5.62)
WBC # BLD AUTO: 12.42 THOUSAND/UL (ref 4.31–10.16)

## 2019-02-11 PROCEDURE — 85025 COMPLETE CBC W/AUTO DIFF WBC: CPT

## 2019-02-11 PROCEDURE — 36415 COLL VENOUS BLD VENIPUNCTURE: CPT

## 2019-02-18 ENCOUNTER — APPOINTMENT (OUTPATIENT)
Dept: LAB | Facility: MEDICAL CENTER | Age: 64
End: 2019-02-18
Payer: COMMERCIAL

## 2019-02-18 ENCOUNTER — TRANSCRIBE ORDERS (OUTPATIENT)
Dept: ADMINISTRATIVE | Facility: HOSPITAL | Age: 64
End: 2019-02-18

## 2019-02-18 ENCOUNTER — TELEPHONE (OUTPATIENT)
Dept: HEMATOLOGY ONCOLOGY | Facility: CLINIC | Age: 64
End: 2019-02-18

## 2019-02-18 DIAGNOSIS — D70.8 AUTO IMMUNE NEUTROPENIA (HCC): Primary | ICD-10-CM

## 2019-02-18 DIAGNOSIS — D70.8 AUTO IMMUNE NEUTROPENIA (HCC): ICD-10-CM

## 2019-02-18 LAB
BASOPHILS # BLD AUTO: 0.03 THOUSANDS/ΜL (ref 0–0.1)
BASOPHILS NFR BLD AUTO: 0 % (ref 0–1)
EOSINOPHIL # BLD AUTO: 0.12 THOUSAND/ΜL (ref 0–0.61)
EOSINOPHIL NFR BLD AUTO: 1 % (ref 0–6)
ERYTHROCYTE [DISTWIDTH] IN BLOOD BY AUTOMATED COUNT: 14.4 % (ref 11.6–15.1)
HCT VFR BLD AUTO: 39.8 % (ref 36.5–49.3)
HGB BLD-MCNC: 13.7 G/DL (ref 12–17)
IMM GRANULOCYTES # BLD AUTO: 0.1 THOUSAND/UL (ref 0–0.2)
IMM GRANULOCYTES NFR BLD AUTO: 1 % (ref 0–2)
LYMPHOCYTES # BLD AUTO: 1.42 THOUSANDS/ΜL (ref 0.6–4.47)
LYMPHOCYTES NFR BLD AUTO: 11 % (ref 14–44)
MCH RBC QN AUTO: 41 PG (ref 26.8–34.3)
MCHC RBC AUTO-ENTMCNC: 34.4 G/DL (ref 31.4–37.4)
MCV RBC AUTO: 119 FL (ref 82–98)
MONOCYTES # BLD AUTO: 0.67 THOUSAND/ΜL (ref 0.17–1.22)
MONOCYTES NFR BLD AUTO: 5 % (ref 4–12)
NEUTROPHILS # BLD AUTO: 11.06 THOUSANDS/ΜL (ref 1.85–7.62)
NEUTS SEG NFR BLD AUTO: 82 % (ref 43–75)
NRBC BLD AUTO-RTO: 0 /100 WBCS
PLATELET # BLD AUTO: 376 THOUSANDS/UL (ref 149–390)
PMV BLD AUTO: 10.9 FL (ref 8.9–12.7)
RBC # BLD AUTO: 3.34 MILLION/UL (ref 3.88–5.62)
WBC # BLD AUTO: 13.4 THOUSAND/UL (ref 4.31–10.16)

## 2019-02-18 PROCEDURE — 85025 COMPLETE CBC W/AUTO DIFF WBC: CPT

## 2019-02-18 PROCEDURE — 36415 COLL VENOUS BLD VENIPUNCTURE: CPT

## 2019-02-19 DIAGNOSIS — D59.10 AUTOIMMUNE HEMOLYTIC ANEMIA (HCC): Primary | ICD-10-CM

## 2019-02-25 ENCOUNTER — APPOINTMENT (OUTPATIENT)
Dept: LAB | Facility: MEDICAL CENTER | Age: 64
End: 2019-02-25
Payer: COMMERCIAL

## 2019-02-25 ENCOUNTER — TELEPHONE (OUTPATIENT)
Dept: HEMATOLOGY ONCOLOGY | Facility: CLINIC | Age: 64
End: 2019-02-25

## 2019-02-25 LAB
BASOPHILS # BLD AUTO: 0.04 THOUSANDS/ΜL (ref 0–0.1)
BASOPHILS NFR BLD AUTO: 0 % (ref 0–1)
EOSINOPHIL # BLD AUTO: 0.1 THOUSAND/ΜL (ref 0–0.61)
EOSINOPHIL NFR BLD AUTO: 1 % (ref 0–6)
ERYTHROCYTE [DISTWIDTH] IN BLOOD BY AUTOMATED COUNT: 14 % (ref 11.6–15.1)
HCT VFR BLD AUTO: 41.8 % (ref 36.5–49.3)
HGB BLD-MCNC: 14.4 G/DL (ref 12–17)
IMM GRANULOCYTES # BLD AUTO: 0.19 THOUSAND/UL (ref 0–0.2)
IMM GRANULOCYTES NFR BLD AUTO: 1 % (ref 0–2)
LYMPHOCYTES # BLD AUTO: 2.06 THOUSANDS/ΜL (ref 0.6–4.47)
LYMPHOCYTES NFR BLD AUTO: 13 % (ref 14–44)
MCH RBC QN AUTO: 40.9 PG (ref 26.8–34.3)
MCHC RBC AUTO-ENTMCNC: 34.4 G/DL (ref 31.4–37.4)
MCV RBC AUTO: 119 FL (ref 82–98)
MONOCYTES # BLD AUTO: 0.79 THOUSAND/ΜL (ref 0.17–1.22)
MONOCYTES NFR BLD AUTO: 5 % (ref 4–12)
NEUTROPHILS # BLD AUTO: 12.62 THOUSANDS/ΜL (ref 1.85–7.62)
NEUTS SEG NFR BLD AUTO: 80 % (ref 43–75)
NRBC BLD AUTO-RTO: 0 /100 WBCS
PLATELET # BLD AUTO: 392 THOUSANDS/UL (ref 149–390)
PMV BLD AUTO: 10.8 FL (ref 8.9–12.7)
RBC # BLD AUTO: 3.52 MILLION/UL (ref 3.88–5.62)
WBC # BLD AUTO: 15.8 THOUSAND/UL (ref 4.31–10.16)

## 2019-02-25 PROCEDURE — 85025 COMPLETE CBC W/AUTO DIFF WBC: CPT

## 2019-02-25 PROCEDURE — 36415 COLL VENOUS BLD VENIPUNCTURE: CPT

## 2019-02-28 ENCOUNTER — OFFICE VISIT (OUTPATIENT)
Dept: HEMATOLOGY ONCOLOGY | Facility: CLINIC | Age: 64
End: 2019-02-28
Payer: COMMERCIAL

## 2019-02-28 VITALS
TEMPERATURE: 98.1 F | SYSTOLIC BLOOD PRESSURE: 168 MMHG | WEIGHT: 210 LBS | OXYGEN SATURATION: 97 % | DIASTOLIC BLOOD PRESSURE: 84 MMHG | HEART RATE: 78 BPM | BODY MASS INDEX: 31.83 KG/M2 | HEIGHT: 68 IN | RESPIRATION RATE: 17 BRPM

## 2019-02-28 DIAGNOSIS — D59.10 AUTOIMMUNE HEMOLYTIC ANEMIA (HCC): Primary | ICD-10-CM

## 2019-02-28 DIAGNOSIS — G47.00 INSOMNIA, UNSPECIFIED TYPE: ICD-10-CM

## 2019-02-28 DIAGNOSIS — D59.10 ACQUIRED AUTOIMMUNE HEMOLYTIC ANEMIA (HCC): ICD-10-CM

## 2019-02-28 LAB — MISCELLANEOUS LAB TEST RESULT: NORMAL

## 2019-02-28 PROCEDURE — 99214 OFFICE O/P EST MOD 30 MIN: CPT | Performed by: INTERNAL MEDICINE

## 2019-02-28 RX ORDER — PREDNISONE 20 MG/1
20 TABLET ORAL DAILY
Qty: 60 TABLET | Refills: 3 | Status: SHIPPED | OUTPATIENT
Start: 2019-02-28 | End: 2019-06-03 | Stop reason: CLARIF

## 2019-02-28 RX ORDER — ZOLPIDEM TARTRATE 5 MG/1
5 TABLET ORAL
Qty: 30 TABLET | Refills: 0 | Status: SHIPPED | OUTPATIENT
Start: 2019-02-28 | End: 2019-04-11

## 2019-02-28 NOTE — PROGRESS NOTES
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  He is currently on prednisone 40 mg p o  Daily  He has insomnia, not unexpectedly  I suggested that he take the prednisone in the morning  He is currently taking 20 with breakfast and 20 with lunch  Additionally, his hemoglobin has been normal but prednisone toxicities are increasing with insomnia, daytime fatigue, increased bruising, weight gain, etc   CMP is requested to evaluate blood glucose, liver enzymes, etc   He is eligible for research trial at the [facility]  He was recently contacted by them  I will contact his physician there to get more information about this but encouraged him to proceed if it is possible  Ambien is prescribed for insomnia in the meantime  I asked him to call in a few days so we could make adjustments on his medications accordingly  I discussed the above with the patient  The patient  voiced understanding and agreement   ______________________________________________________________________    Chief Complaint   Patient presents with    Follow-up       HPI:   No history exists  Interval History:  Clinically stable    1 - Symptomatic but completely ambulatory    Review of Systems    Past Medical History:   Diagnosis Date    Autoimmune hemolytic anemia ([address] )     LOWE (dyspnea on exertion)     last assessed 11/02/2016    DVT (deep venous thrombosis) (HCC)     GERD (gastroesophageal reflux disease)     Hemolytic anemia (HCC)     Palpitation     Portal vein thrombosis     Pulmonary emboli (HCC)     Tobacco abuse      Patient Active Problem List   Diagnosis    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)    Hyperbilirubinemia    Symptomatic anemia    Pulmonary embolism (HCC)    Portal vein thrombosis    Elevated blood pressure reading without diagnosis of hypertension    GERD (gastroesophageal reflux disease)    Autoimmune hemolytic anemia (HCC)    Splenomegaly    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       Current Outpatient Medications:     cyanocobalamin (VITAMIN B-12) 1,000 mcg tablet, Take 1,000 mcg by mouth daily, Disp: , Rfl:     dabigatran etexilate (PRADAXA) 150 mg capsu, Take 1 capsule (150 mg total) by mouth 2 (two) times a day, Disp: 60 capsule, Rfl: 3    ergocalciferol (VITAMIN D2) 50,000 units, Take 50,000 Units by mouth once a week  , Disp: , Rfl:     JADENU 360 MG TABS, Take 3 tablets by mouth daily  , Disp: 45 tablet, Rfl: 5    LORazepam (ATIVAN) 0 5 mg tablet, Take 1 tablet (0 5 mg total) by mouth every 6 (six) hours as needed for anxiety, Disp: 30 tablet, Rfl: 1    Multiple Vitamins-Minerals (ONE DAILY MENS) TABS, Take by mouth EVERY OTHER DAY , Disp: , Rfl:     pantoprazole (PROTONIX) 40 mg tablet, Take 1 tablet (40 mg total) by mouth daily in the early morning, Disp: 30 tablet, Rfl: 3    predniSONE 10 mg tablet, Take 6 tablet by mouth daily or as directed, Disp: 180 tablet, Rfl: 0    predniSONE 20 mg tablet, TAKE THREE TABLETS BY MOUTH DAILY OR AS DIRECTED WITH FOOD, Disp: 90 tablet, Rfl: 0  Allergies   Allergen Reactions    Iodinated Diagnostic Agents Hives     Past Surgical History:   Procedure Laterality Date    ELBOW ARTHROPLASTY Left     bursectomy    KNEE SURGERY Right     meniscus tear    MD LAP,CHOLECYSTECTOMY/GRAPH N/A 12/23/2017    Procedure: CHOLECYSTECTOMY LAPAROSCOPIC with cholangiogram;  Surgeon: Denisha Baker MD;  Location: [facility];  Service: General    MD REMOVAL SPLEEN, TOTAL N/A 5/18/2017    Procedure: LAPAROSCOPIC HAND ASSIST SPLENECTOMY;  Surgeon: Sergio Mason MD;  [facility]: [facility];  Service: Surgical Oncology    SHOULDER SURGERY Left     rotator cuff x4, reconstruction     Social History     Objective:  Vitals:    02/28/19 1303   BP: 168/84   BP Location: Right arm   Patient Position: Sitting   Pulse: 78   Resp: 17   Temp: 98 1 °F (36 7 °C)   TempSrc: Tympanic   SpO2: 97%   Weight: 95 3 kg (210 lb)   Height: 5' 8" (1 727 m)     Physical Exam      Labs: I personally reviewed the labs and imaging pertinent to this patient care

## 2019-03-04 ENCOUNTER — APPOINTMENT (OUTPATIENT)
Dept: LAB | Facility: MEDICAL CENTER | Age: 64
End: 2019-03-04
Payer: COMMERCIAL

## 2019-03-04 ENCOUNTER — TELEPHONE (OUTPATIENT)
Dept: HEMATOLOGY ONCOLOGY | Facility: CLINIC | Age: 64
End: 2019-03-04

## 2019-03-04 DIAGNOSIS — D59.10 AUTOIMMUNE HEMOLYTIC ANEMIA (HCC): ICD-10-CM

## 2019-03-04 LAB
ALBUMIN SERPL BCP-MCNC: 4.3 G/DL (ref 3.5–5)
ALP SERPL-CCNC: 76 U/L (ref 46–116)
ALT SERPL W P-5'-P-CCNC: 45 U/L (ref 12–78)
ANION GAP SERPL CALCULATED.3IONS-SCNC: 5 MMOL/L (ref 4–13)
AST SERPL W P-5'-P-CCNC: 25 U/L (ref 5–45)
BILIRUB SERPL-MCNC: 1.11 MG/DL (ref 0.2–1)
BUN SERPL-MCNC: 17 MG/DL (ref 5–25)
CALCIUM SERPL-MCNC: 8.8 MG/DL (ref 8.3–10.1)
CHLORIDE SERPL-SCNC: 105 MMOL/L (ref 100–108)
CO2 SERPL-SCNC: 31 MMOL/L (ref 21–32)
CREAT SERPL-MCNC: 1.36 MG/DL (ref 0.6–1.3)
GFR SERPL CREATININE-BSD FRML MDRD: 55 ML/MIN/1.73SQ M
GLUCOSE SERPL-MCNC: 159 MG/DL (ref 65–140)
POTASSIUM SERPL-SCNC: 3.2 MMOL/L (ref 3.5–5.3)
PROT SERPL-MCNC: 6.8 G/DL (ref 6.4–8.2)
SODIUM SERPL-SCNC: 141 MMOL/L (ref 136–145)

## 2019-03-04 PROCEDURE — 80053 COMPREHEN METABOLIC PANEL: CPT

## 2019-03-04 NOTE — TELEPHONE ENCOUNTER
Chip Rojas called wanting to go over his lab results  Informed him that the results are preliminary and he would have to wait until we have the final results to get the numbers  He informed me he would like to be called by the end of business with the results

## 2019-03-11 ENCOUNTER — APPOINTMENT (OUTPATIENT)
Dept: LAB | Facility: MEDICAL CENTER | Age: 64
End: 2019-03-11
Payer: COMMERCIAL

## 2019-03-18 ENCOUNTER — APPOINTMENT (OUTPATIENT)
Dept: LAB | Facility: MEDICAL CENTER | Age: 64
End: 2019-03-18
Payer: COMMERCIAL

## 2019-03-25 ENCOUNTER — APPOINTMENT (OUTPATIENT)
Dept: LAB | Facility: MEDICAL CENTER | Age: 64
End: 2019-03-25
Payer: COMMERCIAL

## 2019-03-26 ENCOUNTER — TELEPHONE (OUTPATIENT)
Dept: HEMATOLOGY ONCOLOGY | Facility: CLINIC | Age: 64
End: 2019-03-26

## 2019-03-26 DIAGNOSIS — D59.10 AUTOIMMUNE HEMOLYTIC ANEMIA (HCC): ICD-10-CM

## 2019-03-26 RX ORDER — PREDNISONE 10 MG/1
TABLET ORAL
Qty: 180 TABLET | Refills: 0 | Status: SHIPPED | OUTPATIENT
Start: 2019-03-26 | End: 2019-08-13

## 2019-04-04 ENCOUNTER — TELEPHONE (OUTPATIENT)
Dept: HEMATOLOGY ONCOLOGY | Facility: CLINIC | Age: 64
End: 2019-04-04

## 2019-04-04 DIAGNOSIS — D59.10 AUTOIMMUNE HEMOLYTIC ANEMIA (HCC): Primary | ICD-10-CM

## 2019-04-08 ENCOUNTER — APPOINTMENT (OUTPATIENT)
Dept: LAB | Facility: MEDICAL CENTER | Age: 64
End: 2019-04-08
Payer: MEDICARE

## 2019-04-08 LAB — FERRITIN SERPL-MCNC: 2714 NG/ML (ref 8–388)

## 2019-04-08 PROCEDURE — 82728 ASSAY OF FERRITIN: CPT

## 2019-04-08 PROCEDURE — 36415 COLL VENOUS BLD VENIPUNCTURE: CPT

## 2019-04-11 ENCOUNTER — OFFICE VISIT (OUTPATIENT)
Dept: FAMILY MEDICINE CLINIC | Facility: CLINIC | Age: 64
End: 2019-04-11
Payer: MEDICARE

## 2019-04-11 VITALS
TEMPERATURE: 98.1 F | RESPIRATION RATE: 18 BRPM | OXYGEN SATURATION: 97 % | DIASTOLIC BLOOD PRESSURE: 100 MMHG | WEIGHT: 206 LBS | HEART RATE: 84 BPM | SYSTOLIC BLOOD PRESSURE: 162 MMHG | HEIGHT: 68 IN | BODY MASS INDEX: 31.22 KG/M2

## 2019-04-11 DIAGNOSIS — J20.9 ACUTE BRONCHITIS, UNSPECIFIED ORGANISM: Primary | ICD-10-CM

## 2019-04-11 DIAGNOSIS — E83.19 IRON OVERLOAD: ICD-10-CM

## 2019-04-11 PROCEDURE — 99213 OFFICE O/P EST LOW 20 MIN: CPT | Performed by: FAMILY MEDICINE

## 2019-04-11 RX ORDER — DEFERASIROX 360 MG/1
3 TABLET, FILM COATED ORAL DAILY
Qty: 90 TABLET | Refills: 2 | Status: SHIPPED | OUTPATIENT
Start: 2019-04-11 | End: 2020-02-18 | Stop reason: ALTCHOICE

## 2019-04-11 RX ORDER — ASPIRIN 81 MG/1
81 TABLET ORAL
COMMUNITY
End: 2019-10-28

## 2019-04-11 RX ORDER — ALBUTEROL SULFATE 90 UG/1
2 AEROSOL, METERED RESPIRATORY (INHALATION) EVERY 6 HOURS PRN
Qty: 1 INHALER | Refills: 5 | Status: SHIPPED | OUTPATIENT
Start: 2019-04-11 | End: 2019-08-13

## 2019-04-11 RX ORDER — BENZONATATE 200 MG/1
200 CAPSULE ORAL 3 TIMES DAILY PRN
Qty: 20 CAPSULE | Refills: 0 | Status: SHIPPED | OUTPATIENT
Start: 2019-04-11 | End: 2019-08-13

## 2019-04-11 RX ORDER — LEVOFLOXACIN 500 MG/1
500 TABLET, FILM COATED ORAL EVERY 24 HOURS
Qty: 7 TABLET | Refills: 0 | Status: SHIPPED | OUTPATIENT
Start: 2019-04-11 | End: 2019-04-18

## 2019-04-15 ENCOUNTER — APPOINTMENT (OUTPATIENT)
Dept: LAB | Facility: MEDICAL CENTER | Age: 64
End: 2019-04-15
Payer: MEDICARE

## 2019-04-17 ENCOUNTER — TELEPHONE (OUTPATIENT)
Dept: HEMATOLOGY ONCOLOGY | Facility: CLINIC | Age: 64
End: 2019-04-17

## 2019-04-17 DIAGNOSIS — K21.9 GASTROESOPHAGEAL REFLUX DISEASE, ESOPHAGITIS PRESENCE NOT SPECIFIED: ICD-10-CM

## 2019-04-17 RX ORDER — PANTOPRAZOLE SODIUM 40 MG/1
40 TABLET, DELAYED RELEASE ORAL
Qty: 30 TABLET | Refills: 3 | Status: SHIPPED | OUTPATIENT
Start: 2019-04-17 | End: 2019-08-19 | Stop reason: SDUPTHER

## 2019-04-22 ENCOUNTER — APPOINTMENT (OUTPATIENT)
Dept: LAB | Facility: MEDICAL CENTER | Age: 64
End: 2019-04-22
Payer: MEDICARE

## 2019-04-26 ENCOUNTER — DOCUMENTATION (OUTPATIENT)
Dept: HEMATOLOGY ONCOLOGY | Facility: CLINIC | Age: 64
End: 2019-04-26

## 2019-04-29 ENCOUNTER — APPOINTMENT (OUTPATIENT)
Dept: LAB | Facility: MEDICAL CENTER | Age: 64
End: 2019-04-29
Payer: MEDICARE

## 2019-05-06 ENCOUNTER — APPOINTMENT (OUTPATIENT)
Dept: LAB | Facility: MEDICAL CENTER | Age: 64
End: 2019-05-06
Payer: MEDICARE

## 2019-05-06 ENCOUNTER — TELEPHONE (OUTPATIENT)
Dept: HEMATOLOGY ONCOLOGY | Facility: CLINIC | Age: 64
End: 2019-05-06

## 2019-05-06 DIAGNOSIS — E83.19 IRON OVERLOAD: Primary | ICD-10-CM

## 2019-05-06 NOTE — DISCHARGE SUMMARY
[address] INTERNAL MEDICINE  [facility] [address] [facility] [address] 60 y o  male   MRN: [id number]   Encounter: [id number]   Unit/Bed: E4 -01     Admitting Provider:  Sandy Zamora MD  Discharge Provider:  Spencer Hernandez DO  Admission Date: 6/6/2017       Discharge Date: 06/09/17   LOS: 3  Primary Care Physician at Discharge: Briseida Yates [phone]    DISCHARGE DIAGNOSES  Principal Problem:  1  Acute pulmonary embolism  The patient was started on Xarelto; will need close follow-up with hematology  He was found to have mild to moderate tricuspid regurgitation, may need further evaluation will repeat echocardiogram  Active Problems:  2  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  Prednisone was initially increased  He'll be discharged with 80 mg daily until seen by hematology  3  Hyperbilirubinemia  Secondary to hemolytic anemia  4  Portal vein thrombosis  Anticoagulate as above  5  Elevated blood pressure reading without diagnosis of hypertension  Likely the setting of prednisone and hospitalization  Will need further monitoring; will defer on starting new agents at moment  Resolved Problems:    * No resolved hospital problems  *    HOSPITAL COURSE:  Nathalie Savage is a 60 y o  male who presented to hospital fatigue and shortness of breath  The patient has known hemolytic anemia and recently underwent a splenectomy 4/74/7519 without complications  His hemoglobin was on uprise so his prednisone was being tapered  He presents to the hospital with swelling of right calf; lower extremity duplex was negative however he was found to have pulmonary embolism with portal vein thrombosis and hemoglobin of 5 5  He was admitted to critical care initially  During hospitalization, he was seen by medical oncology  His prednisone was dosed by weight  He received total 3 units PRBC   At time of discharge, his hemoglobin is stable; he has been started on Xarelto for Primary Care Providers:Dustin Toscano MD, MD (General)Your patient was seen today for a HRA visit. Gap(s) in care (HEDIS gaps) have been identified during this visit that require additional testing and possible follow up.No orders of the defined types were placed in this encounter.These orders were placed using [facility] approved protocol and any results will be forwarded to your office for appropriate follow up. I have included a copy of my visit note; please review the note and feel free to contact me with any questions. Thank you for allowing me to participate in the care of your patients.SELENA Brown anticoagulation  CONSULTING PROVIDERS   1  Dr Guerra    PROCEDURES PERFORMED    Cta Chest Pe Study  Result Date: 6/6/2017  Impression: Several small peripheral pulmonary emboli identified  No central pulmonary emboli  ##cfslh I personally discussed this result with Ananth Cardona RN on 6/6/2017 2:39 PM  ##    Workstation performed: [id number]     Vas Lower Limb Venous Duplex Study, Complete Bilateral  Result Date: 6/6/2017  RIGHT LOWER LIMB: No evidence of acute or chronic deep vein thrombosis  No evidence of superficial thrombophlebitis noted  Doppler evaluation shows a normal response to augmentation maneuvers  Popliteal, posterior tibial and anterior tibial arterial Doppler waveforms are triphasic  LEFT LOWER LIMB: No evidence of acute or chronic deep vein thrombosis  No evidence of superficial thrombophlebitis noted  Doppler evaluation shows a normal response to augmentation maneuvers  Popliteal, posterior tibial and anterior tibial arterial Doppler waveforms are triphasic  Tech note: Preliminary report given to Kira Butt at Dr Noel Bach office  AB    SIGNATURE: Electronically Signed by: Sammi Smith on 2017-06-06 09:56:19 PM    Ct Abdomen Pelvis With Contrast  Result Date: 6/6/2017  Impression: 1  Post splenectomy  2   No intra-abdominal hemorrhage  3   Intrahepatic portal venous thrombosis  4   I have discussed my findings with SHADY Louie  Workstation performed: [id number]     Echocardiogram  Date: 6/7/2017  Result:          Other Pertinent Test Results    Results from last 7 days  Lab Units 06/09/17  0645 06/08/17  0457 06/07/17  0415 06/06/17  1636 06/06/17  1430   WBC Thousand/uL 20 81* 22 64* 18 55* 16 01* 15 59*   HEMOGLOBIN g/dL 8 8* 7 4* 7 3* 5 5* 5 9*   HEMATOCRIT % 28 8* 23 9* 23 1* 18 9* 20 0*   MCV fL 114* 119* 111* 126* 134*   PLATELETS Thousands/uL 815* 900* 747* 742* 1021*   INR   --   --  1 17* 1 07  --        Results from last 7 days  Lab Units 06/09/17 0645 06/08/17  0457 06/07/17  0415 06/06/17  1636 06/03/17  0850   SODIUM mmol/L 141 146* 142 142 141   POTASSIUM mmol/L 3 5 3 6 4 2 5 0 4 3   CHLORIDE mmol/L 105 107 106 105 107   CO2 mmol/L 30 30 26 28 25   ANION GAP mmol/L 6 9 10 9 9   BUN mg/dL 16 14 16 14 12   CREATININE mg/dL 0 91 0 94 0 99 0 97 0 87   CALCIUM mg/dL 8 9 8 9 8 8 9 3 8 9   TOTAL PROTEIN g/dL 6 2* 6 1* 6 1* 7 0  7 1 6 1*   BILIRUBIN TOTAL mg/dL 2 38* 2 17* 2 20* 3 14*  2 97*  --    ALK PHOS U/L 97 100 105 120*  120*  --    ALT U/L 29 31 35 43  43  --    AST U/L 24 21 19 44  44  --    EGFR ml/min/1 73sq m >60 0 >60 0 >60 0 >60 0 >60 0   GLUCOSE RANDOM mg/dL 100 94 164* 114  --    MAGNESIUM mg/dL  --   --  2 2  --   --    PHOSPHORUS mg/dL  --   --  4 4*  --   --        Results from last 7 days  Lab Units 06/06/17  1636   TROPONIN I ng/mL <0 02       Results from last 7 days  Lab Units 06/06/17  1636   NT-PRO BNP pg/mL 488*        PHYSICAL EXAM:  Vitals:   Blood Pressure: 150/85 (06/09/17 0754)  Pulse: 74 (06/09/17 0754)  Temperature: (!) 96 5 °F (35 8 °C) (06/09/17 0754)  Temp Source: Tympanic (06/09/17 0754)  Respirations: 18 (06/09/17 0754)  Height: 5' 9" (175 3 cm) (06/06/17 2118)  Weight - Scale: 83 2 kg (183 lb 6 8 oz) (06/07/17 0623)  SpO2: 99 % (06/09/17 0754)    General appearance: alert, appears stated age and cooperative  Skin: prior surgical incisions intact  Head: Normocephalic, without obvious abnormality, atraumatic  Eyes: conjunctivae/corneas clear  PERRL, EOM's intact  Lungs: clear to auscultation bilaterally  Heart: regular rate and rhythm  Abdomen: soft, nontender, previous incisions intact  Back: symmetric, no curvature  ROM normal  No CVA tenderness  Extremities: no edema, redness or tenderness in the calves or thighs  Neurologic: Grossly normal    Planned Re-admission: No  Discharge Disposition: Home/Self Care    Test Results Pending at Discharge: Hematologic studies  Follow-up with hematology    Incidental findings: Tricuspid regurgitation  Consider repeat echocardiogram    Medications   Please see After Visit Summary for reconciled discharge medications provided to patient and family  Diet restrictions: Regular diet  Activity restrictions: No strenuous activity  Discharge Condition: fair    Outpatient Follow-Up  1  Abdirahman Refugio, [address] / [address] [place] [address] Kenny Rice [id number] [phone] - follow-up within one week  2  Estela Springerch, DO medical oncology  Follow-up within one week  Code Status: Level 1 - Full Code  Discharge Statement   I spent 36 minutes discharging the patient  This time was spent on the day of discharge  Greater than 50% of total time was spent with the patient and / or family counseling and / or coordination of care  NOTE: This note has been constructed using a voice recognition system

## 2019-05-13 ENCOUNTER — DOCUMENTATION (OUTPATIENT)
Dept: HEMATOLOGY ONCOLOGY | Facility: CLINIC | Age: 64
End: 2019-05-13

## 2019-05-13 ENCOUNTER — TRANSCRIBE ORDERS (OUTPATIENT)
Dept: ADMINISTRATIVE | Facility: HOSPITAL | Age: 64
End: 2019-05-13

## 2019-05-13 ENCOUNTER — APPOINTMENT (OUTPATIENT)
Dept: LAB | Facility: MEDICAL CENTER | Age: 64
End: 2019-05-13
Payer: MEDICARE

## 2019-05-13 DIAGNOSIS — E83.19 IRON OVERLOAD: ICD-10-CM

## 2019-05-13 DIAGNOSIS — D59.10 HEMOLYTIC ANEMIA DUE TO ANTIBODY (HCC): ICD-10-CM

## 2019-05-13 DIAGNOSIS — D59.10 HEMOLYTIC ANEMIA DUE TO ANTIBODY (HCC): Primary | ICD-10-CM

## 2019-05-13 LAB
BASOPHILS # BLD AUTO: 0.04 THOUSANDS/ΜL (ref 0–0.1)
BASOPHILS NFR BLD AUTO: 0 % (ref 0–1)
EOSINOPHIL # BLD AUTO: 0.09 THOUSAND/ΜL (ref 0–0.61)
EOSINOPHIL NFR BLD AUTO: 1 % (ref 0–6)
ERYTHROCYTE [DISTWIDTH] IN BLOOD BY AUTOMATED COUNT: 15.2 % (ref 11.6–15.1)
FERRITIN SERPL-MCNC: 3084 NG/ML (ref 8–388)
HCT VFR BLD AUTO: 39.1 % (ref 36.5–49.3)
HGB BLD-MCNC: 13.1 G/DL (ref 12–17)
IMM GRANULOCYTES # BLD AUTO: 0.06 THOUSAND/UL (ref 0–0.2)
IMM GRANULOCYTES NFR BLD AUTO: 0 % (ref 0–2)
LYMPHOCYTES # BLD AUTO: 3.24 THOUSANDS/ΜL (ref 0.6–4.47)
LYMPHOCYTES NFR BLD AUTO: 19 % (ref 14–44)
MCH RBC QN AUTO: 38.6 PG (ref 26.8–34.3)
MCHC RBC AUTO-ENTMCNC: 33.5 G/DL (ref 31.4–37.4)
MCV RBC AUTO: 115 FL (ref 82–98)
MONOCYTES # BLD AUTO: 1.09 THOUSAND/ΜL (ref 0.17–1.22)
MONOCYTES NFR BLD AUTO: 6 % (ref 4–12)
NEUTROPHILS # BLD AUTO: 12.89 THOUSANDS/ΜL (ref 1.85–7.62)
NEUTS SEG NFR BLD AUTO: 74 % (ref 43–75)
NRBC BLD AUTO-RTO: 0 /100 WBCS
PLATELET # BLD AUTO: 406 THOUSANDS/UL (ref 149–390)
PMV BLD AUTO: 10.8 FL (ref 8.9–12.7)
RBC # BLD AUTO: 3.39 MILLION/UL (ref 3.88–5.62)
WBC # BLD AUTO: 17.41 THOUSAND/UL (ref 4.31–10.16)

## 2019-05-13 PROCEDURE — 82728 ASSAY OF FERRITIN: CPT

## 2019-05-13 PROCEDURE — 85025 COMPLETE CBC W/AUTO DIFF WBC: CPT

## 2019-05-13 PROCEDURE — 36415 COLL VENOUS BLD VENIPUNCTURE: CPT

## 2019-05-14 ENCOUNTER — TELEPHONE (OUTPATIENT)
Dept: HEMATOLOGY ONCOLOGY | Facility: CLINIC | Age: 64
End: 2019-05-14

## 2019-05-14 DIAGNOSIS — I26.99 OTHER ACUTE PULMONARY EMBOLISM WITHOUT ACUTE COR PULMONALE (HCC): ICD-10-CM

## 2019-05-14 RX ORDER — DABIGATRAN ETEXILATE 150 MG/1
150 CAPSULE, COATED PELLETS ORAL 2 TIMES DAILY
Qty: 60 CAPSULE | Refills: 3 | Status: SHIPPED | OUTPATIENT
Start: 2019-05-14 | End: 2019-09-15 | Stop reason: SDUPTHER

## 2019-05-17 ENCOUNTER — DOCUMENTATION (OUTPATIENT)
Dept: HEMATOLOGY ONCOLOGY | Facility: CLINIC | Age: 64
End: 2019-05-17

## 2019-05-20 ENCOUNTER — APPOINTMENT (OUTPATIENT)
Dept: LAB | Facility: MEDICAL CENTER | Age: 64
End: 2019-05-20
Payer: MEDICARE

## 2019-05-20 ENCOUNTER — TELEPHONE (OUTPATIENT)
Dept: GYNECOLOGIC ONCOLOGY | Facility: CLINIC | Age: 64
End: 2019-05-20

## 2019-05-20 DIAGNOSIS — D59.10 HEMOLYTIC ANEMIA DUE TO ANTIBODY (HCC): ICD-10-CM

## 2019-05-20 DIAGNOSIS — D59.10 AUTOIMMUNE HEMOLYTIC ANEMIA (HCC): Primary | ICD-10-CM

## 2019-05-20 LAB
BASOPHILS # BLD AUTO: 0.06 THOUSANDS/ΜL (ref 0–0.1)
BASOPHILS NFR BLD AUTO: 1 % (ref 0–1)
EOSINOPHIL # BLD AUTO: 0.15 THOUSAND/ΜL (ref 0–0.61)
EOSINOPHIL NFR BLD AUTO: 1 % (ref 0–6)
ERYTHROCYTE [DISTWIDTH] IN BLOOD BY AUTOMATED COUNT: 15.2 % (ref 11.6–15.1)
HCT VFR BLD AUTO: 36.2 % (ref 36.5–49.3)
HGB BLD-MCNC: 12.6 G/DL (ref 12–17)
IMM GRANULOCYTES # BLD AUTO: 0.08 THOUSAND/UL (ref 0–0.2)
IMM GRANULOCYTES NFR BLD AUTO: 1 % (ref 0–2)
LYMPHOCYTES # BLD AUTO: 3.75 THOUSANDS/ΜL (ref 0.6–4.47)
LYMPHOCYTES NFR BLD AUTO: 32 % (ref 14–44)
MCH RBC QN AUTO: 40.3 PG (ref 26.8–34.3)
MCHC RBC AUTO-ENTMCNC: 34.8 G/DL (ref 31.4–37.4)
MCV RBC AUTO: 116 FL (ref 82–98)
MONOCYTES # BLD AUTO: 1.12 THOUSAND/ΜL (ref 0.17–1.22)
MONOCYTES NFR BLD AUTO: 10 % (ref 4–12)
NEUTROPHILS # BLD AUTO: 6.54 THOUSANDS/ΜL (ref 1.85–7.62)
NEUTS SEG NFR BLD AUTO: 55 % (ref 43–75)
NRBC BLD AUTO-RTO: 0 /100 WBCS
PLATELET # BLD AUTO: 506 THOUSANDS/UL (ref 149–390)
PMV BLD AUTO: 10.5 FL (ref 8.9–12.7)
RBC # BLD AUTO: 3.13 MILLION/UL (ref 3.88–5.62)
WBC # BLD AUTO: 11.7 THOUSAND/UL (ref 4.31–10.16)

## 2019-05-20 PROCEDURE — 85025 COMPLETE CBC W/AUTO DIFF WBC: CPT

## 2019-05-20 PROCEDURE — 36415 COLL VENOUS BLD VENIPUNCTURE: CPT

## 2019-05-28 ENCOUNTER — APPOINTMENT (OUTPATIENT)
Dept: LAB | Facility: MEDICAL CENTER | Age: 64
End: 2019-05-28
Payer: MEDICARE

## 2019-05-28 ENCOUNTER — TELEPHONE (OUTPATIENT)
Dept: HEMATOLOGY ONCOLOGY | Facility: CLINIC | Age: 64
End: 2019-05-28

## 2019-05-28 LAB
BASOPHILS # BLD AUTO: 0.07 THOUSANDS/ΜL (ref 0–0.1)
BASOPHILS NFR BLD AUTO: 1 % (ref 0–1)
EOSINOPHIL # BLD AUTO: 0.16 THOUSAND/ΜL (ref 0–0.61)
EOSINOPHIL NFR BLD AUTO: 1 % (ref 0–6)
ERYTHROCYTE [DISTWIDTH] IN BLOOD BY AUTOMATED COUNT: 15.3 % (ref 11.6–15.1)
HCT VFR BLD AUTO: 37.4 % (ref 36.5–49.3)
HGB BLD-MCNC: 12.9 G/DL (ref 12–17)
IMM GRANULOCYTES # BLD AUTO: 0.07 THOUSAND/UL (ref 0–0.2)
IMM GRANULOCYTES NFR BLD AUTO: 1 % (ref 0–2)
LYMPHOCYTES # BLD AUTO: 2.94 THOUSANDS/ΜL (ref 0.6–4.47)
LYMPHOCYTES NFR BLD AUTO: 23 % (ref 14–44)
MCH RBC QN AUTO: 40.3 PG (ref 26.8–34.3)
MCHC RBC AUTO-ENTMCNC: 34.5 G/DL (ref 31.4–37.4)
MCV RBC AUTO: 117 FL (ref 82–98)
MONOCYTES # BLD AUTO: 1.21 THOUSAND/ΜL (ref 0.17–1.22)
MONOCYTES NFR BLD AUTO: 10 % (ref 4–12)
NEUTROPHILS # BLD AUTO: 8.3 THOUSANDS/ΜL (ref 1.85–7.62)
NEUTS SEG NFR BLD AUTO: 64 % (ref 43–75)
NRBC BLD AUTO-RTO: 0 /100 WBCS
PLATELET # BLD AUTO: 507 THOUSANDS/UL (ref 149–390)
PMV BLD AUTO: 10.8 FL (ref 8.9–12.7)
RBC # BLD AUTO: 3.2 MILLION/UL (ref 3.88–5.62)
WBC # BLD AUTO: 12.75 THOUSAND/UL (ref 4.31–10.16)

## 2019-05-28 PROCEDURE — 85025 COMPLETE CBC W/AUTO DIFF WBC: CPT

## 2019-05-28 PROCEDURE — 36415 COLL VENOUS BLD VENIPUNCTURE: CPT

## 2019-06-03 ENCOUNTER — OFFICE VISIT (OUTPATIENT)
Dept: HEMATOLOGY ONCOLOGY | Facility: CLINIC | Age: 64
End: 2019-06-03
Payer: MEDICARE

## 2019-06-03 ENCOUNTER — APPOINTMENT (OUTPATIENT)
Dept: LAB | Facility: MEDICAL CENTER | Age: 64
End: 2019-06-03
Payer: MEDICARE

## 2019-06-03 VITALS
OXYGEN SATURATION: 97 % | RESPIRATION RATE: 16 BRPM | HEIGHT: 68 IN | TEMPERATURE: 97.6 F | SYSTOLIC BLOOD PRESSURE: 144 MMHG | BODY MASS INDEX: 30.25 KG/M2 | WEIGHT: 199.6 LBS | DIASTOLIC BLOOD PRESSURE: 86 MMHG | HEART RATE: 88 BPM

## 2019-06-03 DIAGNOSIS — E83.111 IRON OVERLOAD DUE TO REPEATED RED BLOOD CELL TRANSFUSIONS: ICD-10-CM

## 2019-06-03 DIAGNOSIS — D59.11 HEMOLYTIC ANEMIA DUE TO WARM ANTIBODY (HCC): Primary | ICD-10-CM

## 2019-06-03 PROCEDURE — 99214 OFFICE O/P EST MOD 30 MIN: CPT | Performed by: INTERNAL MEDICINE

## 2019-06-03 RX ORDER — PREDNISONE 20 MG/1
20 TABLET ORAL DAILY
COMMUNITY
End: 2019-10-28

## 2019-06-10 ENCOUNTER — APPOINTMENT (OUTPATIENT)
Dept: LAB | Facility: MEDICAL CENTER | Age: 64
End: 2019-06-10
Payer: MEDICARE

## 2019-06-10 ENCOUNTER — TELEPHONE (OUTPATIENT)
Dept: HEMATOLOGY ONCOLOGY | Facility: CLINIC | Age: 64
End: 2019-06-10

## 2019-06-10 LAB — FERRITIN SERPL-MCNC: 2641 NG/ML (ref 8–388)

## 2019-06-10 PROCEDURE — 82728 ASSAY OF FERRITIN: CPT | Performed by: INTERNAL MEDICINE

## 2019-06-10 PROCEDURE — 36415 COLL VENOUS BLD VENIPUNCTURE: CPT | Performed by: INTERNAL MEDICINE

## 2019-06-17 ENCOUNTER — APPOINTMENT (OUTPATIENT)
Dept: LAB | Facility: MEDICAL CENTER | Age: 64
End: 2019-06-17
Payer: MEDICARE

## 2019-06-24 ENCOUNTER — TELEPHONE (OUTPATIENT)
Dept: HEMATOLOGY ONCOLOGY | Facility: CLINIC | Age: 64
End: 2019-06-24

## 2019-06-24 ENCOUNTER — APPOINTMENT (OUTPATIENT)
Dept: LAB | Facility: MEDICAL CENTER | Age: 64
End: 2019-06-24
Payer: MEDICARE

## 2019-06-25 ENCOUNTER — TELEPHONE (OUTPATIENT)
Dept: HEMATOLOGY ONCOLOGY | Facility: CLINIC | Age: 64
End: 2019-06-25

## 2019-07-08 ENCOUNTER — APPOINTMENT (OUTPATIENT)
Dept: LAB | Facility: MEDICAL CENTER | Age: 64
End: 2019-07-08
Payer: MEDICARE

## 2019-07-08 ENCOUNTER — TELEPHONE (OUTPATIENT)
Dept: HEMATOLOGY ONCOLOGY | Facility: CLINIC | Age: 64
End: 2019-07-08

## 2019-07-08 DIAGNOSIS — D59.11 HEMOLYTIC ANEMIA DUE TO WARM ANTIBODY (HCC): ICD-10-CM

## 2019-07-08 DIAGNOSIS — D59.10 AUTOIMMUNE HEMOLYTIC ANEMIA (HCC): ICD-10-CM

## 2019-07-08 LAB
BASOPHILS # BLD AUTO: 0.06 THOUSANDS/ΜL (ref 0–0.1)
BASOPHILS NFR BLD AUTO: 1 % (ref 0–1)
EOSINOPHIL # BLD AUTO: 0.15 THOUSAND/ΜL (ref 0–0.61)
EOSINOPHIL NFR BLD AUTO: 1 % (ref 0–6)
ERYTHROCYTE [DISTWIDTH] IN BLOOD BY AUTOMATED COUNT: 18.3 % (ref 11.6–15.1)
HCT VFR BLD AUTO: 32.7 % (ref 36.5–49.3)
HGB BLD-MCNC: 11.4 G/DL (ref 12–17)
IMM GRANULOCYTES # BLD AUTO: 0.06 THOUSAND/UL (ref 0–0.2)
IMM GRANULOCYTES NFR BLD AUTO: 1 % (ref 0–2)
LYMPHOCYTES # BLD AUTO: 1.5 THOUSANDS/ΜL (ref 0.6–4.47)
LYMPHOCYTES NFR BLD AUTO: 12 % (ref 14–44)
MCH RBC QN AUTO: 42.5 PG (ref 26.8–34.3)
MCHC RBC AUTO-ENTMCNC: 34.9 G/DL (ref 31.4–37.4)
MCV RBC AUTO: 122 FL (ref 82–98)
MONOCYTES # BLD AUTO: 1.12 THOUSAND/ΜL (ref 0.17–1.22)
MONOCYTES NFR BLD AUTO: 9 % (ref 4–12)
NEUTROPHILS # BLD AUTO: 10.11 THOUSANDS/ΜL (ref 1.85–7.62)
NEUTS SEG NFR BLD AUTO: 76 % (ref 43–75)
NRBC BLD AUTO-RTO: 0 /100 WBCS
PLATELET # BLD AUTO: 461 THOUSANDS/UL (ref 149–390)
PMV BLD AUTO: 10.3 FL (ref 8.9–12.7)
RBC # BLD AUTO: 2.68 MILLION/UL (ref 3.88–5.62)
WBC # BLD AUTO: 13 THOUSAND/UL (ref 4.31–10.16)

## 2019-07-08 PROCEDURE — 36415 COLL VENOUS BLD VENIPUNCTURE: CPT

## 2019-07-08 PROCEDURE — 85025 COMPLETE CBC W/AUTO DIFF WBC: CPT

## 2019-07-08 NOTE — TELEPHONE ENCOUNTER
Patient called requesting that results from the CBC and differentialhe completed today   Best call back [phone]

## 2019-07-15 ENCOUNTER — TELEPHONE (OUTPATIENT)
Dept: HEMATOLOGY ONCOLOGY | Facility: CLINIC | Age: 64
End: 2019-07-15

## 2019-07-15 ENCOUNTER — APPOINTMENT (OUTPATIENT)
Dept: LAB | Facility: MEDICAL CENTER | Age: 64
End: 2019-07-15
Payer: MEDICARE

## 2019-07-15 DIAGNOSIS — D59.11 HEMOLYTIC ANEMIA DUE TO WARM ANTIBODY (HCC): ICD-10-CM

## 2019-07-15 LAB
ERYTHROCYTE [DISTWIDTH] IN BLOOD BY AUTOMATED COUNT: 16.4 % (ref 11.6–15.1)
HCT VFR BLD AUTO: 32.3 % (ref 36.5–49.3)
HGB BLD-MCNC: 11.6 G/DL (ref 12–17)
MCH RBC QN AUTO: 43.1 PG (ref 26.8–34.3)
MCHC RBC AUTO-ENTMCNC: 35.9 G/DL (ref 31.4–37.4)
MCV RBC AUTO: 120 FL (ref 82–98)
PLATELET # BLD AUTO: 414 THOUSANDS/UL (ref 149–390)
PMV BLD AUTO: 11 FL (ref 8.9–12.7)
RBC # BLD AUTO: 2.69 MILLION/UL (ref 3.88–5.62)
WBC # BLD AUTO: 14.09 THOUSAND/UL (ref 4.31–10.16)

## 2019-07-15 PROCEDURE — 36415 COLL VENOUS BLD VENIPUNCTURE: CPT

## 2019-07-15 PROCEDURE — 85027 COMPLETE CBC AUTOMATED: CPT

## 2019-07-15 NOTE — TELEPHONE ENCOUNTER
Patient called requesting lab results for CBC  Verbalized understanding to receive a call back for further assistance   Please call back at [phone]

## 2019-07-30 ENCOUNTER — APPOINTMENT (OUTPATIENT)
Dept: LAB | Facility: MEDICAL CENTER | Age: 64
End: 2019-07-30
Payer: MEDICARE

## 2019-07-30 DIAGNOSIS — D59.10 AUTOIMMUNE HEMOLYTIC ANEMIA (HCC): ICD-10-CM

## 2019-07-30 DIAGNOSIS — D59.11 HEMOLYTIC ANEMIA DUE TO WARM ANTIBODY (HCC): ICD-10-CM

## 2019-07-30 DIAGNOSIS — E83.111 IRON OVERLOAD DUE TO REPEATED RED BLOOD CELL TRANSFUSIONS: ICD-10-CM

## 2019-07-30 LAB
BASOPHILS # BLD AUTO: 0.04 THOUSANDS/ΜL (ref 0–0.1)
BASOPHILS NFR BLD AUTO: 0 % (ref 0–1)
EOSINOPHIL # BLD AUTO: 0.01 THOUSAND/ΜL (ref 0–0.61)
EOSINOPHIL NFR BLD AUTO: 0 % (ref 0–6)
ERYTHROCYTE [DISTWIDTH] IN BLOOD BY AUTOMATED COUNT: 16.5 % (ref 11.6–15.1)
FERRITIN SERPL-MCNC: 1913 NG/ML (ref 8–388)
HCT VFR BLD AUTO: 32.2 % (ref 36.5–49.3)
HGB BLD-MCNC: 10.8 G/DL (ref 12–17)
IMM GRANULOCYTES # BLD AUTO: 0.18 THOUSAND/UL (ref 0–0.2)
IMM GRANULOCYTES NFR BLD AUTO: 1 % (ref 0–2)
LYMPHOCYTES # BLD AUTO: 2 THOUSANDS/ΜL (ref 0.6–4.47)
LYMPHOCYTES NFR BLD AUTO: 11 % (ref 14–44)
MCH RBC QN AUTO: 40.3 PG (ref 26.8–34.3)
MCHC RBC AUTO-ENTMCNC: 33.5 G/DL (ref 31.4–37.4)
MCV RBC AUTO: 120 FL (ref 82–98)
MONOCYTES # BLD AUTO: 1.11 THOUSAND/ΜL (ref 0.17–1.22)
MONOCYTES NFR BLD AUTO: 6 % (ref 4–12)
NEUTROPHILS # BLD AUTO: 15.1 THOUSANDS/ΜL (ref 1.85–7.62)
NEUTS SEG NFR BLD AUTO: 82 % (ref 43–75)
NRBC BLD AUTO-RTO: 1 /100 WBCS
PLATELET # BLD AUTO: 622 THOUSANDS/UL (ref 149–390)
PMV BLD AUTO: 10.6 FL (ref 8.9–12.7)
RBC # BLD AUTO: 2.68 MILLION/UL (ref 3.88–5.62)
WBC # BLD AUTO: 18.44 THOUSAND/UL (ref 4.31–10.16)

## 2019-07-30 PROCEDURE — 85025 COMPLETE CBC W/AUTO DIFF WBC: CPT

## 2019-07-30 PROCEDURE — 82728 ASSAY OF FERRITIN: CPT

## 2019-07-30 PROCEDURE — 36415 COLL VENOUS BLD VENIPUNCTURE: CPT

## 2019-08-05 ENCOUNTER — APPOINTMENT (OUTPATIENT)
Dept: LAB | Facility: MEDICAL CENTER | Age: 64
End: 2019-08-05
Payer: MEDICARE

## 2019-08-05 DIAGNOSIS — D59.11 HEMOLYTIC ANEMIA DUE TO WARM ANTIBODY (HCC): ICD-10-CM

## 2019-08-05 LAB
ERYTHROCYTE [DISTWIDTH] IN BLOOD BY AUTOMATED COUNT: 18.6 % (ref 11.6–15.1)
HCT VFR BLD AUTO: 31.2 % (ref 36.5–49.3)
HGB BLD-MCNC: 10.8 G/DL (ref 12–17)
MCH RBC QN AUTO: 42.4 PG (ref 26.8–34.3)
MCHC RBC AUTO-ENTMCNC: 34.6 G/DL (ref 31.4–37.4)
MCV RBC AUTO: 122 FL (ref 82–98)
PLATELET # BLD AUTO: 565 THOUSANDS/UL (ref 149–390)
PMV BLD AUTO: 10.7 FL (ref 8.9–12.7)
RBC # BLD AUTO: 2.55 MILLION/UL (ref 3.88–5.62)
WBC # BLD AUTO: 14.24 THOUSAND/UL (ref 4.31–10.16)

## 2019-08-05 PROCEDURE — 36415 COLL VENOUS BLD VENIPUNCTURE: CPT

## 2019-08-05 PROCEDURE — 85027 COMPLETE CBC AUTOMATED: CPT

## 2019-08-12 ENCOUNTER — APPOINTMENT (OUTPATIENT)
Dept: LAB | Facility: MEDICAL CENTER | Age: 64
End: 2019-08-12
Payer: MEDICARE

## 2019-08-12 ENCOUNTER — TELEPHONE (OUTPATIENT)
Dept: HEMATOLOGY ONCOLOGY | Facility: CLINIC | Age: 64
End: 2019-08-12

## 2019-08-12 DIAGNOSIS — D59.11 HEMOLYTIC ANEMIA DUE TO WARM ANTIBODY (HCC): ICD-10-CM

## 2019-08-12 LAB
ERYTHROCYTE [DISTWIDTH] IN BLOOD BY AUTOMATED COUNT: 17.2 % (ref 11.6–15.1)
HCT VFR BLD AUTO: 34.7 % (ref 36.5–49.3)
HGB BLD-MCNC: 11.9 G/DL (ref 12–17)
MCH RBC QN AUTO: 42.7 PG (ref 26.8–34.3)
MCHC RBC AUTO-ENTMCNC: 34.3 G/DL (ref 31.4–37.4)
MCV RBC AUTO: 124 FL (ref 82–98)
PLATELET # BLD AUTO: 510 THOUSANDS/UL (ref 149–390)
PMV BLD AUTO: 11.1 FL (ref 8.9–12.7)
RBC # BLD AUTO: 2.79 MILLION/UL (ref 3.88–5.62)
WBC # BLD AUTO: 14.63 THOUSAND/UL (ref 4.31–10.16)

## 2019-08-12 PROCEDURE — 36415 COLL VENOUS BLD VENIPUNCTURE: CPT

## 2019-08-12 PROCEDURE — 85027 COMPLETE CBC AUTOMATED: CPT

## 2019-08-13 ENCOUNTER — OFFICE VISIT (OUTPATIENT)
Dept: FAMILY MEDICINE CLINIC | Facility: CLINIC | Age: 64
End: 2019-08-13
Payer: MEDICARE

## 2019-08-13 ENCOUNTER — APPOINTMENT (OUTPATIENT)
Dept: RADIOLOGY | Facility: MEDICAL CENTER | Age: 64
End: 2019-08-13
Payer: MEDICARE

## 2019-08-13 VITALS
SYSTOLIC BLOOD PRESSURE: 144 MMHG | HEART RATE: 76 BPM | RESPIRATION RATE: 16 BRPM | BODY MASS INDEX: 30.31 KG/M2 | WEIGHT: 200 LBS | OXYGEN SATURATION: 97 % | HEIGHT: 68 IN | DIASTOLIC BLOOD PRESSURE: 86 MMHG | TEMPERATURE: 97.3 F

## 2019-08-13 DIAGNOSIS — M54.6 ACUTE LEFT-SIDED THORACIC BACK PAIN: Primary | ICD-10-CM

## 2019-08-13 DIAGNOSIS — R07.81 RIB PAIN ON LEFT SIDE: ICD-10-CM

## 2019-08-13 DIAGNOSIS — M54.6 ACUTE LEFT-SIDED THORACIC BACK PAIN: ICD-10-CM

## 2019-08-13 PROCEDURE — 71046 X-RAY EXAM CHEST 2 VIEWS: CPT

## 2019-08-13 PROCEDURE — 71100 X-RAY EXAM RIBS UNI 2 VIEWS: CPT

## 2019-08-13 PROCEDURE — 72072 X-RAY EXAM THORAC SPINE 3VWS: CPT

## 2019-08-13 PROCEDURE — 99214 OFFICE O/P EST MOD 30 MIN: CPT | Performed by: FAMILY MEDICINE

## 2019-08-13 RX ORDER — CYCLOBENZAPRINE HCL 5 MG
5 TABLET ORAL 3 TIMES DAILY PRN
Qty: 30 TABLET | Refills: 0 | Status: SHIPPED | OUTPATIENT
Start: 2019-08-13 | End: 2019-10-28

## 2019-08-13 NOTE — PROGRESS NOTES
BMI Counseling: Body mass index is 30 41 kg/m²  Discussed the patient's BMI with him  Assessment/Plan:  Discussed diagnostic and treatment options with patient  Patient is being sent for chest x-ray PA and lateral, thoracic spine x-ray and left rib x-ray  Will heed results  Patient was started on cyclobenzaprine 10 mg t i d  P r n , caution regarding drowsiness  Patient instructed to discontinue ibuprofen and Aleve and avoid NSAIDs  Patient may apply ice alternating with heat for 20 minutes each and rest   Return to the office in 2-3 weeks or sooner p r n  London Kendall Diagnoses and all orders for this visit:    Acute left-sided thoracic back pain  -     XR spine thoracic 3 vw; Future  -     cyclobenzaprine (FLEXERIL) 5 mg tablet; Take 1 tablet (5 mg total) by mouth 3 (three) times a day as needed for muscle spasms    Rib pain on left side  -     XR ribs 2 vw left; Future  -     XR chest pa & lateral; Future  -     cyclobenzaprine (FLEXERIL) 5 mg tablet; Take 1 tablet (5 mg total) by mouth 3 (three) times a day as needed for muscle spasms          Subjective:      Patient ID: Quinn Machuca is a 71 y o  male  Patient complains of left-sided back and rib pain for the past 4 months  Pain started when patient had bronchitis and was coughing  Pain had improved until about 10 days ago when he sneezed complains of recurrent left-sided back and rib cage pain  He admits to chronic shortness of breath but no worsening  Patient has been playing golf without difficulty  He has treated this with ibuprofen and Percocet with some relief  Back Pain   This is a recurrent problem  The current episode started more than 1 month ago  The problem occurs constantly  The problem has been gradually worsening since onset  The pain is present in the thoracic spine  The quality of the pain is described as burning, shooting and stabbing  The pain does not radiate  The pain is the same all the time   The symptoms are aggravated by coughing, bending and twisting (sneezing)  Pertinent negatives include no abdominal pain, bladder incontinence, bowel incontinence, chest pain, leg pain, numbness, tingling or weakness  He has tried analgesics, ice and NSAIDs for the symptoms  The treatment provided mild relief  The following portions of the patient's history were reviewed and updated as appropriate: allergies, current medications, past family history, past medical history, past social history, past surgical history and problem list     Review of Systems   Cardiovascular: Negative for chest pain  Gastrointestinal: Negative for abdominal pain and bowel incontinence  Genitourinary: Negative for bladder incontinence  Musculoskeletal: Positive for back pain  Neurological: Negative for tingling, weakness and numbness  Objective:      /86   Pulse 76   Temp (!) 97 3 °F (36 3 °C) (Tympanic)   Resp 16   Ht 5' 8" (1 727 m)   Wt 90 7 kg (200 lb)   SpO2 97%   BMI 30 41 kg/m²          Physical Exam   Constitutional: He is oriented to person, place, and time  He appears well-developed and well-nourished  No distress  HENT:   Head: Normocephalic  Mouth/Throat: Oropharynx is clear and moist    Eyes: Conjunctivae are normal  No scleral icterus  Neck: Neck supple  Cardiovascular: Normal rate and regular rhythm  Pulmonary/Chest: Effort normal and breath sounds normal  No respiratory distress  He exhibits tenderness  Left lateral and posterior mid rib cage tenderness to palpation  Abdominal: Soft  There is no tenderness  Musculoskeletal: He exhibits tenderness  He exhibits no edema  Positive bilateral mid thoracic paravertebral tenderness  Negative spinous process tenderness  Lymphadenopathy:     He has no cervical adenopathy  Neurological: He is alert and oriented to person, place, and time  Skin: Skin is warm and dry  He is not diaphoretic  Psychiatric: He has a normal mood and affect       The BMI is above average  BMI counseling and education was provided to the patient  Nutrition recommendations include reducing portion sizes, reducing fast food intake and consuming healthier snacks  Exercise recommendations include moderate aerobic physical activity for 150 minutes/week

## 2019-08-19 ENCOUNTER — TELEPHONE (OUTPATIENT)
Dept: FAMILY MEDICINE CLINIC | Facility: CLINIC | Age: 64
End: 2019-08-19

## 2019-08-19 DIAGNOSIS — S22.000A THORACIC COMPRESSION FRACTURE, CLOSED, INITIAL ENCOUNTER (HCC): Primary | ICD-10-CM

## 2019-08-19 DIAGNOSIS — K21.9 GASTROESOPHAGEAL REFLUX DISEASE, ESOPHAGITIS PRESENCE NOT SPECIFIED: ICD-10-CM

## 2019-08-19 RX ORDER — PANTOPRAZOLE SODIUM 40 MG/1
40 TABLET, DELAYED RELEASE ORAL
Qty: 30 TABLET | Refills: 3 | Status: SHIPPED | OUTPATIENT
Start: 2019-08-19 | End: 2019-12-27 | Stop reason: SDUPTHER

## 2019-08-19 NOTE — TELEPHONE ENCOUNTER
Patient called to get a refill on Protonix 40 mg  Patient has only 2 days left of the medication  Patient uses [facility] in [facility]  Please call back and advise  Thank you

## 2019-08-26 ENCOUNTER — APPOINTMENT (OUTPATIENT)
Dept: LAB | Facility: MEDICAL CENTER | Age: 64
End: 2019-08-26
Payer: MEDICARE

## 2019-08-26 ENCOUNTER — TELEPHONE (OUTPATIENT)
Dept: HEMATOLOGY ONCOLOGY | Facility: CLINIC | Age: 64
End: 2019-08-26

## 2019-08-26 NOTE — TELEPHONE ENCOUNTER
Patient called inquiring about the results of the blood work done this morning  Please call back and advise  Thank you

## 2019-09-09 ENCOUNTER — APPOINTMENT (OUTPATIENT)
Dept: LAB | Facility: MEDICAL CENTER | Age: 64
End: 2019-09-09
Payer: MEDICARE

## 2019-09-09 DIAGNOSIS — D59.11 HEMOLYTIC ANEMIA DUE TO WARM ANTIBODY (HCC): ICD-10-CM

## 2019-09-09 LAB
ERYTHROCYTE [DISTWIDTH] IN BLOOD BY AUTOMATED COUNT: 17.3 % (ref 11.6–15.1)
HCT VFR BLD AUTO: 35.5 % (ref 36.5–49.3)
HGB BLD-MCNC: 12.2 G/DL (ref 12–17)
MCH RBC QN AUTO: 41.4 PG (ref 26.8–34.3)
MCHC RBC AUTO-ENTMCNC: 34.4 G/DL (ref 31.4–37.4)
MCV RBC AUTO: 120 FL (ref 82–98)
PLATELET # BLD AUTO: 436 THOUSANDS/UL (ref 149–390)
PMV BLD AUTO: 10.7 FL (ref 8.9–12.7)
RBC # BLD AUTO: 2.95 MILLION/UL (ref 3.88–5.62)
WBC # BLD AUTO: 12.92 THOUSAND/UL (ref 4.31–10.16)

## 2019-09-09 PROCEDURE — 85027 COMPLETE CBC AUTOMATED: CPT

## 2019-09-09 PROCEDURE — 36415 COLL VENOUS BLD VENIPUNCTURE: CPT

## 2019-09-15 DIAGNOSIS — I26.99 OTHER ACUTE PULMONARY EMBOLISM WITHOUT ACUTE COR PULMONALE (HCC): ICD-10-CM

## 2019-09-16 ENCOUNTER — TELEPHONE (OUTPATIENT)
Dept: HEMATOLOGY ONCOLOGY | Facility: CLINIC | Age: 64
End: 2019-09-16

## 2019-09-16 DIAGNOSIS — I26.99 OTHER ACUTE PULMONARY EMBOLISM WITHOUT ACUTE COR PULMONALE (HCC): ICD-10-CM

## 2019-09-17 ENCOUNTER — TELEPHONE (OUTPATIENT)
Dept: HEMATOLOGY ONCOLOGY | Facility: CLINIC | Age: 64
End: 2019-09-17

## 2019-09-17 RX ORDER — DABIGATRAN ETEXILATE 150 MG/1
150 CAPSULE, COATED PELLETS ORAL 2 TIMES DAILY
Qty: 60 CAPSULE | Refills: 4 | Status: SHIPPED | OUTPATIENT
Start: 2019-09-17 | End: 2019-12-16 | Stop reason: SDUPTHER

## 2019-09-17 RX ORDER — ATENOLOL 100 MG/1
TABLET ORAL
Qty: 60 CAPSULE | Refills: 2 | Status: SHIPPED | OUTPATIENT
Start: 2019-09-17 | End: 2019-10-28

## 2019-09-30 ENCOUNTER — APPOINTMENT (OUTPATIENT)
Dept: LAB | Facility: MEDICAL CENTER | Age: 64
End: 2019-09-30
Payer: MEDICARE

## 2019-10-09 DIAGNOSIS — D59.11 HEMOLYTIC ANEMIA DUE TO WARM ANTIBODY (HCC): Primary | ICD-10-CM

## 2019-10-09 RX ORDER — PREDNISONE 10 MG/1
TABLET ORAL
Qty: 120 TABLET | Refills: 0 | Status: SHIPPED | OUTPATIENT
Start: 2019-10-09 | End: 2019-10-28

## 2019-10-18 ENCOUNTER — TELEPHONE (OUTPATIENT)
Dept: HEMATOLOGY ONCOLOGY | Facility: CLINIC | Age: 64
End: 2019-10-18

## 2019-10-18 DIAGNOSIS — E83.19 IRON OVERLOAD: Primary | ICD-10-CM

## 2019-10-18 DIAGNOSIS — D59.10 ACQUIRED AUTOIMMUNE HEMOLYTIC ANEMIA (HCC): ICD-10-CM

## 2019-10-18 NOTE — TELEPHONE ENCOUNTER
Spoke with pt and let him know that we will be putting in Ferritin for him  He voiced understanding

## 2019-10-21 ENCOUNTER — APPOINTMENT (OUTPATIENT)
Dept: LAB | Facility: MEDICAL CENTER | Age: 64
End: 2019-10-21
Payer: MEDICARE

## 2019-10-21 ENCOUNTER — TELEPHONE (OUTPATIENT)
Dept: HEMATOLOGY ONCOLOGY | Facility: CLINIC | Age: 64
End: 2019-10-21

## 2019-10-21 DIAGNOSIS — E83.19 IRON OVERLOAD: Primary | ICD-10-CM

## 2019-10-21 DIAGNOSIS — E83.19 IRON OVERLOAD: ICD-10-CM

## 2019-10-21 DIAGNOSIS — D59.10 ACQUIRED AUTOIMMUNE HEMOLYTIC ANEMIA (HCC): ICD-10-CM

## 2019-10-21 LAB — FERRITIN SERPL-MCNC: 519 NG/ML (ref 8–388)

## 2019-10-21 PROCEDURE — 82728 ASSAY OF FERRITIN: CPT

## 2019-10-21 NOTE — TELEPHONE ENCOUNTER
Returned call to patient - labs reviewed  He is currently on Prednisone 15mg PO daily  Most recent ferritin dropped in to the 500 range  Per Dr Shiv Madrid he can decrease his Jadenu to 2 tabs PO daily from 3 tabs PO daily  Re check Ferritin in 2 months    Patient agreeable to plan and verbalized understanding

## 2019-10-21 NOTE — TELEPHONE ENCOUNTER
Patient called to find out about his blood work he completed this morning  Please call back and advise  Thank you

## 2019-10-28 ENCOUNTER — CONSULT (OUTPATIENT)
Dept: FAMILY MEDICINE CLINIC | Facility: CLINIC | Age: 64
End: 2019-10-28
Payer: MEDICARE

## 2019-10-28 VITALS
HEART RATE: 80 BPM | TEMPERATURE: 98.4 F | HEIGHT: 68 IN | OXYGEN SATURATION: 96 % | RESPIRATION RATE: 18 BRPM | DIASTOLIC BLOOD PRESSURE: 90 MMHG | BODY MASS INDEX: 29.7 KG/M2 | WEIGHT: 196 LBS | SYSTOLIC BLOOD PRESSURE: 144 MMHG

## 2019-10-28 DIAGNOSIS — K21.9 GASTROESOPHAGEAL REFLUX DISEASE WITHOUT ESOPHAGITIS: ICD-10-CM

## 2019-10-28 DIAGNOSIS — Z01.818 PREOP EXAMINATION: Primary | ICD-10-CM

## 2019-10-28 DIAGNOSIS — Z23 FLU VACCINE NEED: ICD-10-CM

## 2019-10-28 DIAGNOSIS — H26.9 CATARACT OF RIGHT EYE, UNSPECIFIED CATARACT TYPE: ICD-10-CM

## 2019-10-28 DIAGNOSIS — D59.10 AUTOIMMUNE HEMOLYTIC ANEMIA (HCC): ICD-10-CM

## 2019-10-28 PROBLEM — F43.10 PTSD (POST-TRAUMATIC STRESS DISORDER): Status: ACTIVE | Noted: 2019-10-28

## 2019-10-28 PROCEDURE — 90662 IIV NO PRSV INCREASED AG IM: CPT

## 2019-10-28 PROCEDURE — 99214 OFFICE O/P EST MOD 30 MIN: CPT | Performed by: FAMILY MEDICINE

## 2019-10-28 PROCEDURE — G0008 ADMIN INFLUENZA VIRUS VAC: HCPCS

## 2019-10-28 RX ORDER — PREDNISONE 10 MG/1
10 TABLET ORAL DAILY
COMMUNITY
End: 2020-03-24 | Stop reason: SDUPTHER

## 2019-10-28 NOTE — PROGRESS NOTES
Assessment/Plan:  Patient received high-dose flu vaccine today  Form completed for preoperative H&P and medical clearance and faxed  Continue present treatment  Discussed referral to Orthopedics regarding bilateral knee pain  Diagnoses and all orders for this visit:    Preop examination    Cataract of right eye, unspecified cataract type    Autoimmune hemolytic anemia (HCC)    Gastroesophageal reflux disease without esophagitis    Flu vaccine need  -     influenza vaccine, [id number], high-dose, PF 0 5 mL (FLUZONE HIGH-DOSE)    Other orders  -     predniSONE 10 mg tablet; Take 15 mg by mouth daily  -     NON FORMULARY; Medicinal Marijuana prn          Subjective:      Patient ID: Rizwana Mullins is a 71 y o  male  Patient is here for preoperative H&P and medical clearance form for right cataract surgery scheduled on 11/11/2019 at [facility] with Dr Dimas Lozoya, ophthalmologist   Patient admits to blurred vision of the right eye over the past few months  Patient otherwise been feeling fairly well overall although admits to chronic bilateral knee pain  He requests yearly flu vaccine  Past medical history reviewed on form  The following portions of the patient's history were reviewed and updated as appropriate: allergies, current medications, past family history, past medical history, past social history, past surgical history and problem list     Review of Systems   Constitutional: Negative for activity change, appetite change, chills, diaphoresis, fatigue, fever and unexpected weight change  HENT: Negative  Eyes: Positive for visual disturbance  Negative for photophobia, pain, discharge, redness and itching  Respiratory: Positive for shortness of breath  Negative for cough, chest tightness and wheezing  Cardiovascular: Negative for chest pain, palpitations and leg swelling     Gastrointestinal: Negative for abdominal pain, blood in stool, constipation, diarrhea, nausea and vomiting  Endocrine: Negative for cold intolerance, heat intolerance, polydipsia and polyuria  Genitourinary: Negative for difficulty urinating, dysuria, frequency, hematuria and urgency  Musculoskeletal: Positive for arthralgias, back pain, gait problem and joint swelling  Negative for myalgias, neck pain and neck stiffness  Knees     Skin: Negative  Neurological: Negative for dizziness, syncope, weakness, light-headedness and headaches  Hematological: Negative for adenopathy  Bruises/bleeds easily  Psychiatric/Behavioral: Negative for decreased concentration, dysphoric mood and sleep disturbance  The patient is not nervous/anxious  Objective:      /90   Pulse 80   Temp 98 4 °F (36 9 °C) (Tympanic)   Resp 18   Ht 5' 8" (1 727 m)   Wt 88 9 kg (196 lb)   SpO2 96%   BMI 29 80 kg/m²          Physical Exam   Constitutional: He is oriented to person, place, and time  He appears well-developed and well-nourished  No distress  HENT:   Head: Normocephalic  Right Ear: External ear normal    Left Ear: External ear normal    Nose: Nose normal    Mouth/Throat: Oropharynx is clear and moist    Eyes: Conjunctivae are normal  No scleral icterus  Neck: Neck supple  No thyromegaly present  Cardiovascular: Normal rate and regular rhythm  Pulmonary/Chest: Effort normal and breath sounds normal    Abdominal: Soft  There is no tenderness  Musculoskeletal: He exhibits tenderness  He exhibits no edema  Bilateral knees range of motion intact with crepitance and joint line tenderness  Lymphadenopathy:     He has no cervical adenopathy  Neurological: He is alert and oriented to person, place, and time  Skin: Skin is warm and dry  Psychiatric: He has a normal mood and affect   His behavior is normal  Judgment and thought content normal

## 2019-11-04 ENCOUNTER — APPOINTMENT (OUTPATIENT)
Dept: LAB | Facility: MEDICAL CENTER | Age: 64
End: 2019-11-04
Payer: MEDICARE

## 2019-11-08 ENCOUNTER — OFFICE VISIT (OUTPATIENT)
Dept: FAMILY MEDICINE CLINIC | Facility: CLINIC | Age: 64
End: 2019-11-08
Payer: MEDICARE

## 2019-11-08 VITALS
TEMPERATURE: 98.1 F | DIASTOLIC BLOOD PRESSURE: 102 MMHG | WEIGHT: 198 LBS | BODY MASS INDEX: 30.11 KG/M2 | SYSTOLIC BLOOD PRESSURE: 164 MMHG | HEART RATE: 72 BPM | RESPIRATION RATE: 16 BRPM

## 2019-11-08 DIAGNOSIS — I10 ESSENTIAL HYPERTENSION: Primary | ICD-10-CM

## 2019-11-08 PROCEDURE — 99214 OFFICE O/P EST MOD 30 MIN: CPT | Performed by: FAMILY MEDICINE

## 2019-11-08 NOTE — PROGRESS NOTES
Assessment/Plan:  New onset hypertension  Discussed treatment options with patient  Patient was started on amlodipine 5 mg daily  Recommend patient discontinue salt in his diet  Recommend patient avoid strenuous activity and monitors blood pressure at home  Return to the office in 2 weeks or sooner lester Grayson Diagnoses and all orders for this visit:    Essential hypertension          Subjective:      Patient ID: Dannielle Olsen is a 71 y o  male  Patient had right cataract surgery yesterday at [facility] with Dr Christie Ruiz, ophthalmologist   Patient states his blood pressure was elevated in the range of 150-200 over  during the procedure  Patient states his blood pressure at home last night was 200/98 and this morning was 190/100  Patient denies history of hypertension although admits to occasionally elevated blood pressure at doctors appointments  He denies headache, lightheaded or dizziness  Patient denies chest pain, shortness of breath or orthopnea  He admits to slight dyspnea on exertion which is unchanged, occasional palpitations and slight ankle swelling at the end of the day  Patient admits to family history of hypertension  He is a nonsmoker  Hypertension   This is a new problem  The current episode started yesterday  The problem is uncontrolled  Pertinent negatives include no anxiety, blurred vision, chest pain, headaches, orthopnea, palpitations, peripheral edema, PND or shortness of breath  Agents associated with hypertension include steroids  Risk factors for coronary artery disease include male gender  Past treatments include nothing  The following portions of the patient's history were reviewed and updated as appropriate: allergies, current medications, past family history, past medical history, past social history, past surgical history and problem list     Review of Systems   Eyes: Negative for blurred vision     Respiratory: Negative for shortness of breath  Cardiovascular: Negative for chest pain, palpitations, orthopnea and PND  Neurological: Negative for headaches  Objective:      BP (!) 164/102   Pulse 72   Temp 98 1 °F (36 7 °C)   Resp 16   Wt 89 8 kg (198 lb)   BMI 30 11 kg/m²          Physical Exam   Constitutional: He is oriented to person, place, and time  He appears well-developed and well-nourished  No distress  HENT:   Head: Normocephalic  Right Ear: External ear normal    Left Ear: External ear normal    Nose: Nose normal    Mouth/Throat: Oropharynx is clear and moist    Eyes: Conjunctivae are normal  No scleral icterus  Neck: Neck supple  No thyromegaly present  Cardiovascular: Normal rate and regular rhythm  Pulmonary/Chest: Effort normal and breath sounds normal    Abdominal: Soft  There is no tenderness  Musculoskeletal: He exhibits no edema  Lymphadenopathy:     He has no cervical adenopathy  Neurological: He is alert and oriented to person, place, and time  Skin: Skin is warm and dry  Psychiatric: He has a normal mood and affect

## 2019-11-13 ENCOUNTER — TELEPHONE (OUTPATIENT)
Dept: FAMILY MEDICINE CLINIC | Facility: CLINIC | Age: 64
End: 2019-11-13

## 2019-11-13 NOTE — TELEPHONE ENCOUNTER
Phone call to patient complains of continued lightheaded, dizziness and headache although feeling somewhat better than before  Patient already took amlodipine 5 mg today  Therefore recommend patient hold amlodipine for 1 day and his symptoms improve then decrease to half a tablet daily  Again patient instructed if symptoms persist or worsen he should be seen in the emergency room

## 2019-11-13 NOTE — TELEPHONE ENCOUNTER
Patient called and states that he started the new medication (amlodipine 5mg) prescribed 11/8/19  He is currently experiencing side effects: very dizzy, headaches, fatigue, heart palpitations  I offered to bring him in for appointment, but he is hesitant to drive due to the symptoms he is experiencing   Please advise if he should take other action in regards to medication/etc

## 2019-11-13 NOTE — TELEPHONE ENCOUNTER
Pt is aware of recommendations  He just wanted to inform you that his BP has since gone down, he has current readings around 160/84  Instructed patient to still monitor BP while holding amlodipine

## 2019-11-18 ENCOUNTER — CLINICAL SUPPORT (OUTPATIENT)
Dept: FAMILY MEDICINE CLINIC | Facility: CLINIC | Age: 64
End: 2019-11-18
Payer: MEDICARE

## 2019-11-18 ENCOUNTER — APPOINTMENT (OUTPATIENT)
Dept: LAB | Facility: MEDICAL CENTER | Age: 64
End: 2019-11-18
Payer: MEDICARE

## 2019-11-18 ENCOUNTER — TELEPHONE (OUTPATIENT)
Dept: HEMATOLOGY ONCOLOGY | Facility: CLINIC | Age: 64
End: 2019-11-18

## 2019-11-18 ENCOUNTER — OFFICE VISIT (OUTPATIENT)
Dept: FAMILY MEDICINE CLINIC | Facility: CLINIC | Age: 64
End: 2019-11-18
Payer: MEDICARE

## 2019-11-18 VITALS — DIASTOLIC BLOOD PRESSURE: 100 MMHG | SYSTOLIC BLOOD PRESSURE: 160 MMHG

## 2019-11-18 VITALS
BODY MASS INDEX: 30.01 KG/M2 | RESPIRATION RATE: 16 BRPM | TEMPERATURE: 97 F | WEIGHT: 198 LBS | HEIGHT: 68 IN | DIASTOLIC BLOOD PRESSURE: 100 MMHG | SYSTOLIC BLOOD PRESSURE: 164 MMHG | HEART RATE: 84 BPM

## 2019-11-18 DIAGNOSIS — I10 ESSENTIAL HYPERTENSION: Primary | ICD-10-CM

## 2019-11-18 PROCEDURE — 99213 OFFICE O/P EST LOW 20 MIN: CPT | Performed by: FAMILY MEDICINE

## 2019-11-18 RX ORDER — OFLOXACIN 3 MG/ML
SOLUTION/ DROPS OPHTHALMIC
COMMUNITY
Start: 2019-10-31 | End: 2020-01-14 | Stop reason: ALTCHOICE

## 2019-11-18 RX ORDER — LISINOPRIL 10 MG/1
10 TABLET ORAL DAILY
Qty: 30 TABLET | Refills: 3 | Status: SHIPPED | OUTPATIENT
Start: 2019-11-18 | End: 2019-12-16 | Stop reason: SDUPTHER

## 2019-11-18 RX ORDER — PREDNISOLONE ACETATE 10 MG/ML
SUSPENSION/ DROPS OPHTHALMIC
COMMUNITY
Start: 2019-10-31 | End: 2020-01-14

## 2019-11-18 RX ORDER — AMLODIPINE BESYLATE 5 MG/1
TABLET ORAL
COMMUNITY
End: 2019-11-18

## 2019-11-18 NOTE — PROGRESS NOTES
Assessment/Plan:  Discussed treatment options with patient  Patient will discontinue amlodipine and start lisinopril 10 mg daily  Patient instructed to follow a low-fat and a low-salt and a low-carbohydrate diet and get regular aerobic exercise walking 150 minutes per week  Recommend patient monitor blood pressure daily and call if remains greater than 150/90 consistently  Return to the office in 4 weeks  Diagnoses and all orders for this visit:    Essential hypertension  -     lisinopril (ZESTRIL) 10 mg tablet; Take 1 tablet (10 mg total) by mouth daily    Other orders  -     Discontinue: amLODIPine (NORVASC) 5 mg tablet; amlodipine 5 mg tablet  -     ofloxacin (OCUFLOX) 0 3 % ophthalmic solution  -     prednisoLONE acetate (PRED FORTE) 1 % ophthalmic suspension          Subjective:      Patient ID: Gweneth Ganser is a 71 y o  male  Patient was started on amlodipine 5 mg daily 10 days ago for new onset hypertension  Patient complains of side effects including headache, lightheadedness, dizziness, palpitation and shortness of breath  Patient then decreased to 2 5 mg daily and states his side effects have decreased in half  He is feeling significantly better overall  Although blood pressure remains elevated in the range of 150-170 over   Hypertension   This is a new problem  The current episode started more than 1 month ago  The problem has been gradually worsening since onset  The problem is uncontrolled  Associated symptoms include anxiety, headaches, palpitations and shortness of breath  Pertinent negatives include no blurred vision, chest pain, orthopnea, peripheral edema or PND  Risk factors for coronary artery disease include male gender and obesity  Past treatments include calcium channel blockers  The current treatment provides mild improvement  Compliance problems include medication side effects  There is no history of CAD/MI or CVA         The following portions of the patient's history were reviewed and updated as appropriate: allergies, current medications, past family history, past medical history, past social history, past surgical history and problem list     Review of Systems   Eyes: Negative for blurred vision  Respiratory: Positive for shortness of breath  Cardiovascular: Positive for palpitations  Negative for chest pain, orthopnea and PND  Neurological: Positive for headaches  Objective:      /100   Pulse 84   Temp (!) 97 °F (36 1 °C) (Oral)   Resp 16   Ht 5' 8" (1 727 m)   Wt 89 8 kg (198 lb)   BMI 30 11 kg/m²          Physical Exam   Constitutional: He is oriented to person, place, and time  He appears well-developed and well-nourished  No distress  HENT:   Head: Normocephalic  Mouth/Throat: Oropharynx is clear and moist    Eyes: Conjunctivae are normal  No scleral icterus  Neck: Neck supple  No thyromegaly present  Cardiovascular: Normal rate and regular rhythm  Pulmonary/Chest: Effort normal and breath sounds normal    Abdominal: Soft  There is no tenderness  Musculoskeletal: He exhibits no edema  Lymphadenopathy:     He has no cervical adenopathy  Neurological: He is alert and oriented to person, place, and time  Skin: Skin is warm and dry  Psychiatric: He has a normal mood and affect

## 2019-11-18 NOTE — PROGRESS NOTES
Pt presented to office for blood pressure check  I reviewed results with Dr Jefferson Marvin and appt was made for today

## 2019-11-30 ENCOUNTER — APPOINTMENT (OUTPATIENT)
Dept: LAB | Facility: MEDICAL CENTER | Age: 64
End: 2019-11-30
Payer: MEDICARE

## 2019-11-30 DIAGNOSIS — E83.19 IRON OVERLOAD: ICD-10-CM

## 2019-11-30 LAB — FERRITIN SERPL-MCNC: 802 NG/ML (ref 8–388)

## 2019-11-30 PROCEDURE — 82728 ASSAY OF FERRITIN: CPT

## 2019-12-02 ENCOUNTER — TELEPHONE (OUTPATIENT)
Dept: HEMATOLOGY ONCOLOGY | Facility: CLINIC | Age: 64
End: 2019-12-02

## 2019-12-02 NOTE — TELEPHONE ENCOUNTER
Call transferred from [address] [place] [address]  Patient called for HGB and Ferritin results  Patient advised 11/30/19 Ferritin level 802  HGB 12 9

## 2019-12-03 ENCOUNTER — OFFICE VISIT (OUTPATIENT)
Dept: HEMATOLOGY ONCOLOGY | Facility: CLINIC | Age: 64
End: 2019-12-03
Payer: MEDICARE

## 2019-12-03 VITALS
TEMPERATURE: 98.4 F | WEIGHT: 199.6 LBS | SYSTOLIC BLOOD PRESSURE: 132 MMHG | RESPIRATION RATE: 17 BRPM | HEART RATE: 80 BPM | BODY MASS INDEX: 30.25 KG/M2 | DIASTOLIC BLOOD PRESSURE: 76 MMHG | HEIGHT: 68 IN | OXYGEN SATURATION: 98 %

## 2019-12-03 DIAGNOSIS — D59.10 ACQUIRED AUTOIMMUNE HEMOLYTIC ANEMIA (HCC): ICD-10-CM

## 2019-12-03 DIAGNOSIS — E83.19 IRON OVERLOAD: Primary | ICD-10-CM

## 2019-12-03 PROCEDURE — 99214 OFFICE O/P EST MOD 30 MIN: CPT | Performed by: INTERNAL MEDICINE

## 2019-12-03 NOTE — PROGRESS NOTES
[facility]  [id number] [facility]  [address]  [place] [address] [phone]  [address],1955, [id number]  12/03/19    Discussion:   In summary, this is a 76-year-old male history of autoimmune hemolytic anemia  Clinically he is doing well  He continues on prednisone 10 mg p o  Daily as well as an herbal supplement  Hemoglobin has generally been normal   Prednisone is not bothersome  He did have hypertension which is now under good control  Additionally, he has iron overload related to previous transfusions  Chelation has been effective  Ferritin was down to 500  He decreased the dose of Jadenu and ferritin increased 800  I suggested increasing the dose and trying reduction again in 3-6 months  He certainly is in a better position than when we diagnosed his iron overload, ferritin 3600  I discussed the above with the patient  The patient  voiced understanding and agreement   ______________________________________________________________________    Chief Complaint   Patient presents with    Follow-up       HPI:   No history exists  Interval History:  Clinically stable  ECOG-  0 - Asymptomatic    Review of Systems   Constitutional: Negative for chills and fever  HENT: Negative for nosebleeds  Eyes: Negative for discharge  Respiratory: Negative for cough and shortness of breath  Cardiovascular: Negative for chest pain  Gastrointestinal: Negative for abdominal pain, constipation and diarrhea  Endocrine: Negative for polydipsia  Genitourinary: Negative for hematuria  Musculoskeletal: Negative for arthralgias  Skin: Negative for color change  Allergic/Immunologic: Negative for immunocompromised state  Neurological: Negative for dizziness and headaches  Hematological: Negative for adenopathy  Psychiatric/Behavioral: Negative for agitation         Past Medical History:   Diagnosis Date    Autoimmune hemolytic anemia (HCC)     LOWE (dyspnea on exertion)     last assessed 11/02/2016    DVT (deep venous thrombosis) (HCC)     GERD (gastroesophageal reflux disease)     Hemolytic anemia (HCC)     Palpitation     Portal vein thrombosis     Pulmonary emboli (HCC)     Tobacco abuse      Patient Active Problem List   Diagnosis    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)    Hyperbilirubinemia    Symptomatic anemia    Pulmonary embolism (HCC)    Portal vein thrombosis    Elevated blood pressure reading without diagnosis of hypertension    Shortness of breath    GERD (gastroesophageal reflux disease)    Autoimmune hemolytic anemia (HCC)    Splenomegaly    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    PTSD (post-traumatic stress disorder)    Essential hypertension       Current Outpatient Medications:     cyanocobalamin (VITAMIN B-12) 1,000 mcg tablet, Take 1,000 mcg by mouth daily, Disp: , Rfl:     dabigatran etexilate (PRADAXA) 150 mg capsu, Take 1 capsule (150 mg total) by mouth 2 (two) times a day, Disp: 60 capsule, Rfl: 4    Deferasirox (JADENU) 360 MG TABS, Take 3 tablets (1,080 mg total) by mouth daily (Patient taking differently: Take 2 tablets by mouth daily ), Disp: 90 tablet, Rfl: 2    lisinopril (ZESTRIL) 10 mg tablet, Take 1 tablet (10 mg total) by mouth daily, Disp: 30 tablet, Rfl: 3    NON FORMULARY, Medicinal Marijuana prn, Disp: , Rfl:     ofloxacin (OCUFLOX) 0 3 % ophthalmic solution, , Disp: , Rfl:     pantoprazole (PROTONIX) 40 mg tablet, Take 1 tablet (40 mg total) by mouth daily in the early morning, Disp: 30 tablet, Rfl: 3    prednisoLONE acetate (PRED FORTE) 1 % ophthalmic suspension, , Disp: , Rfl:     predniSONE 10 mg tablet, Take 15 mg by mouth daily, Disp: , Rfl:   Allergies   Allergen Reactions    Iodinated Diagnostic Agents Hives     Past Surgical History:   Procedure Laterality Date    ELBOW ARTHROPLASTY Left     bursectomy    KNEE SURGERY Right meniscus tear    NH LAP,CHOLECYSTECTOMY/GRAPH N/A 12/23/2017    Procedure: CHOLECYSTECTOMY LAPAROSCOPIC with cholangiogram;  Surgeon: Beto Hernández MD;  Location: [facility];  Service: General    NH REMOVAL SPLEEN, TOTAL N/A 5/18/2017    Procedure: LAPAROSCOPIC HAND ASSIST SPLENECTOMY;  Surgeon: Cuco Wayne MD;  Location: [facility];  Service: Surgical Oncology    SHOULDER SURGERY Left     rotator cuff x4, reconstruction     Social History     Objective:  Vitals:    12/03/19 0802   BP: 132/76   BP Location: Left arm   Patient Position: Sitting   Pulse: 80   Resp: 17   Temp: 98 4 °F (36 9 °C)   TempSrc: Tympanic   SpO2: 98%   Weight: 90 5 kg (199 lb 9 6 oz)   Height: 5' 8" (1 727 m)     Physical Exam   Constitutional: He is oriented to person, place, and time  He appears well-developed  HENT:   Head: Normocephalic  Eyes: Pupils are equal, round, and reactive to light  Neck: Neck supple  Cardiovascular: Normal rate and regular rhythm  No murmur heard  Pulmonary/Chest: Breath sounds normal  He has no wheezes  He has no rales  Abdominal: Soft  There is no tenderness  Musculoskeletal: Normal range of motion  He exhibits no edema or tenderness  Lymphadenopathy:     He has no cervical adenopathy  Neurological: He is alert and oriented to person, place, and time  He has normal reflexes  No cranial nerve deficit  Skin: No rash noted  No erythema  Psychiatric: He has a normal mood and affect  His behavior is normal          Labs: I personally reviewed the labs and imaging pertinent to this patient care

## 2019-12-12 ENCOUNTER — DOCUMENTATION (OUTPATIENT)
Dept: HEMATOLOGY ONCOLOGY | Facility: CLINIC | Age: 64
End: 2019-12-12

## 2019-12-12 NOTE — PROGRESS NOTES
12-6-19     Re-Enrollement application completed  Forwarded to provider for signature  Once fully completed I will fax to [facility]    12-13-19  0000 Renewal application submitted to [facility]       12-18-19  Received notice from [employer] stating they had not received renewal felicia  Forwarded renewal felicia again to [facility] with confirmation from previous fax submission    All [facility] renewal apps were originally faxed from [phone]

## 2019-12-14 ENCOUNTER — APPOINTMENT (OUTPATIENT)
Dept: LAB | Facility: MEDICAL CENTER | Age: 64
End: 2019-12-14
Payer: MEDICARE

## 2019-12-14 DIAGNOSIS — E83.19 IRON OVERLOAD: ICD-10-CM

## 2019-12-14 DIAGNOSIS — I26.99 OTHER ACUTE PULMONARY EMBOLISM WITHOUT ACUTE COR PULMONALE (HCC): ICD-10-CM

## 2019-12-14 DIAGNOSIS — D59.10 ACQUIRED AUTOIMMUNE HEMOLYTIC ANEMIA (HCC): ICD-10-CM

## 2019-12-14 LAB
ERYTHROCYTE [DISTWIDTH] IN BLOOD BY AUTOMATED COUNT: 20.1 % (ref 11.6–15.1)
FERRITIN SERPL-MCNC: 512 NG/ML (ref 8–388)
HCT VFR BLD AUTO: 33.3 % (ref 36.5–49.3)
HGB BLD-MCNC: 12.1 G/DL (ref 12–17)
MCH RBC QN AUTO: 45.1 PG (ref 26.8–34.3)
MCHC RBC AUTO-ENTMCNC: 36.3 G/DL (ref 31.4–37.4)
MCV RBC AUTO: 124 FL (ref 82–98)
PLATELET # BLD AUTO: 479 THOUSANDS/UL (ref 149–390)
PMV BLD AUTO: 10.7 FL (ref 8.9–12.7)
RBC # BLD AUTO: 2.68 MILLION/UL (ref 3.88–5.62)
WBC # BLD AUTO: 10.6 THOUSAND/UL (ref 4.31–10.16)

## 2019-12-14 PROCEDURE — 36415 COLL VENOUS BLD VENIPUNCTURE: CPT

## 2019-12-14 PROCEDURE — 85027 COMPLETE CBC AUTOMATED: CPT

## 2019-12-14 PROCEDURE — 82728 ASSAY OF FERRITIN: CPT

## 2019-12-14 RX ORDER — DABIGATRAN ETEXILATE 150 MG/1
150 CAPSULE ORAL 2 TIMES DAILY
Qty: 60 CAPSULE | Refills: 0 | Status: CANCELLED | OUTPATIENT
Start: 2019-12-14

## 2019-12-16 ENCOUNTER — OFFICE VISIT (OUTPATIENT)
Dept: FAMILY MEDICINE CLINIC | Facility: CLINIC | Age: 64
End: 2019-12-16
Payer: MEDICARE

## 2019-12-16 VITALS
WEIGHT: 198 LBS | RESPIRATION RATE: 16 BRPM | BODY MASS INDEX: 30.11 KG/M2 | SYSTOLIC BLOOD PRESSURE: 148 MMHG | DIASTOLIC BLOOD PRESSURE: 90 MMHG | HEART RATE: 84 BPM | TEMPERATURE: 98.4 F

## 2019-12-16 DIAGNOSIS — I26.99 OTHER ACUTE PULMONARY EMBOLISM WITHOUT ACUTE COR PULMONALE (HCC): Primary | ICD-10-CM

## 2019-12-16 DIAGNOSIS — E55.9 VITAMIN D DEFICIENCY: ICD-10-CM

## 2019-12-16 DIAGNOSIS — Z12.5 SCREENING PSA (PROSTATE SPECIFIC ANTIGEN): ICD-10-CM

## 2019-12-16 DIAGNOSIS — I10 ESSENTIAL HYPERTENSION: Primary | ICD-10-CM

## 2019-12-16 PROCEDURE — 99213 OFFICE O/P EST LOW 20 MIN: CPT | Performed by: FAMILY MEDICINE

## 2019-12-16 RX ORDER — LISINOPRIL 20 MG/1
20 TABLET ORAL DAILY
Qty: 30 TABLET | Refills: 5 | Status: SHIPPED | OUTPATIENT
Start: 2019-12-16 | End: 2020-01-14

## 2019-12-16 RX ORDER — DABIGATRAN ETEXILATE 150 MG/1
150 CAPSULE, COATED PELLETS ORAL 2 TIMES DAILY
Qty: 60 CAPSULE | Refills: 4 | Status: SHIPPED | OUTPATIENT
Start: 2019-12-16 | End: 2020-05-15

## 2019-12-16 NOTE — PROGRESS NOTES
Assessment/Plan:  Patient given lab requisition for fasting labs as below  Will heed results  Patient will increase lisinopril to 20 mg daily  Patient instructed to follow a low-fat, low-salt and a low-carbohydrate diet and get regular aerobic exercise walking to 150 minutes per week as tolerated  Continue to monitor blood pressure  Return to the office in 3 months  Essential hypertension  Blood pressure improved although not well controlled  Discussed treatment options with patient and will increase lisinopril to 20 mg daily  Diagnoses and all orders for this visit:    Essential hypertension  -     lisinopril (ZESTRIL) 20 mg tablet; Take 1 tablet (20 mg total) by mouth daily  -     Comprehensive metabolic panel; Future  -     Lipid panel; Future  -     TSH, 3rd generation with Free T4 reflex; Future  -     UA w Reflex to Microscopic w Reflex to Culture -Lab Collect    Screening PSA (prostate specific antigen)  -     PSA, Total Screen    Vitamin D deficiency  -     Vitamin D 25 hydroxy; Future          Subjective:      Patient ID: Troy Monroe is a 71 y o  male  Patient is here for follow-up appointment for hypertension  He has been taking lisinopril 10 mg daily without side effects  He is not fasting today  Patient has been feeling fairly well overall although admits to ongoing headache and fatigue  He recently saw his hematologist/oncologist and has labs drawn every 2 weeks for CBC and ferritin  Blood pressure at home has been in the range of 150-170 over 80-90  Patient exercise at the gym couple times a week  Hypertension   This is a chronic problem  The current episode started more than 1 month ago  The problem has been gradually worsening since onset  The problem is uncontrolled  Associated symptoms include anxiety, headaches, palpitations and shortness of breath  Pertinent negatives include no blurred vision, chest pain, orthopnea, peripheral edema or PND   Risk factors for coronary artery disease include family history, male gender and obesity  Past treatments include ACE inhibitors  The current treatment provides moderate improvement  There are no compliance problems  There is no history of CAD/MI or CVA  The following portions of the patient's history were reviewed and updated as appropriate: allergies, current medications, past family history, past medical history, past social history, past surgical history and problem list     Review of Systems   Eyes: Negative for blurred vision  Respiratory: Positive for shortness of breath  Cardiovascular: Positive for palpitations  Negative for chest pain, orthopnea and PND  Neurological: Positive for headaches  Objective:      /90   Pulse 84   Temp 98 4 °F (36 9 °C)   Resp 16   Wt 89 8 kg (198 lb)   BMI 30 11 kg/m²          Physical Exam   Constitutional: He is oriented to person, place, and time  He appears well-developed and well-nourished  No distress  HENT:   Head: Normocephalic  Mouth/Throat: Oropharynx is clear and moist    Eyes: Conjunctivae are normal  No scleral icterus  Neck: Neck supple  No thyromegaly present  Cardiovascular: Normal rate and regular rhythm  Pulmonary/Chest: Effort normal and breath sounds normal    Abdominal: Soft  There is no tenderness  Musculoskeletal: He exhibits no edema  Lymphadenopathy:     He has no cervical adenopathy  Neurological: He is alert and oriented to person, place, and time  Skin: Skin is warm and dry  Psychiatric: He has a normal mood and affect

## 2019-12-16 NOTE — ASSESSMENT & PLAN NOTE
Blood pressure improved although not well controlled  Discussed treatment options with patient and will increase lisinopril to 20 mg daily

## 2019-12-23 ENCOUNTER — APPOINTMENT (OUTPATIENT)
Dept: LAB | Facility: MEDICAL CENTER | Age: 64
End: 2019-12-23
Payer: MEDICARE

## 2019-12-23 DIAGNOSIS — I10 ESSENTIAL HYPERTENSION: ICD-10-CM

## 2019-12-23 DIAGNOSIS — E55.9 VITAMIN D DEFICIENCY: ICD-10-CM

## 2019-12-23 DIAGNOSIS — D59.10 ACQUIRED AUTOIMMUNE HEMOLYTIC ANEMIA (HCC): ICD-10-CM

## 2019-12-23 LAB
25(OH)D3 SERPL-MCNC: 61.3 NG/ML (ref 30–100)
ALBUMIN SERPL BCP-MCNC: 4.4 G/DL (ref 3.5–5)
ALP SERPL-CCNC: 96 U/L (ref 46–116)
ALT SERPL W P-5'-P-CCNC: 36 U/L (ref 12–78)
ANION GAP SERPL CALCULATED.3IONS-SCNC: 5 MMOL/L (ref 4–13)
AST SERPL W P-5'-P-CCNC: 22 U/L (ref 5–45)
BACTERIA UR QL AUTO: NORMAL /HPF
BILIRUB SERPL-MCNC: 3.35 MG/DL (ref 0.2–1)
BILIRUB UR QL STRIP: NEGATIVE
BUN SERPL-MCNC: 20 MG/DL (ref 5–25)
CALCIUM SERPL-MCNC: 9.7 MG/DL (ref 8.3–10.1)
CHLORIDE SERPL-SCNC: 110 MMOL/L (ref 100–108)
CHOLEST SERPL-MCNC: 195 MG/DL (ref 50–200)
CLARITY UR: CLEAR
CO2 SERPL-SCNC: 30 MMOL/L (ref 21–32)
COLOR UR: ABNORMAL
CREAT SERPL-MCNC: 1.57 MG/DL (ref 0.6–1.3)
ERYTHROCYTE [DISTWIDTH] IN BLOOD BY AUTOMATED COUNT: 18 % (ref 11.6–15.1)
FINE GRAN CASTS URNS QL MICRO: NORMAL /LPF
GFR SERPL CREATININE-BSD FRML MDRD: 46 ML/MIN/1.73SQ M
GLUCOSE P FAST SERPL-MCNC: 127 MG/DL (ref 65–99)
GLUCOSE UR STRIP-MCNC: NEGATIVE MG/DL
HCT VFR BLD AUTO: 37.8 % (ref 36.5–49.3)
HDLC SERPL-MCNC: 79 MG/DL
HGB BLD-MCNC: 13 G/DL (ref 12–17)
HGB UR QL STRIP.AUTO: NEGATIVE
KETONES UR STRIP-MCNC: NEGATIVE MG/DL
LDLC SERPL CALC-MCNC: 100 MG/DL (ref 0–100)
LEUKOCYTE ESTERASE UR QL STRIP: NEGATIVE
MCH RBC QN AUTO: 41.1 PG (ref 26.8–34.3)
MCHC RBC AUTO-ENTMCNC: 34.4 G/DL (ref 31.4–37.4)
MCV RBC AUTO: 120 FL (ref 82–98)
NITRITE UR QL STRIP: NEGATIVE
NON-SQ EPI CELLS URNS QL MICRO: NORMAL /HPF
NONHDLC SERPL-MCNC: 116 MG/DL
PH UR STRIP.AUTO: 6.5 [PH]
PLATELET # BLD AUTO: 456 THOUSANDS/UL (ref 149–390)
PMV BLD AUTO: 10.7 FL (ref 8.9–12.7)
POTASSIUM SERPL-SCNC: 3.7 MMOL/L (ref 3.5–5.3)
PROT SERPL-MCNC: 7 G/DL (ref 6.4–8.2)
PROT UR STRIP-MCNC: ABNORMAL MG/DL
PSA SERPL-MCNC: 0.3 NG/ML (ref 0–4)
RBC # BLD AUTO: 3.16 MILLION/UL (ref 3.88–5.62)
RBC #/AREA URNS AUTO: NORMAL /HPF
SODIUM SERPL-SCNC: 145 MMOL/L (ref 136–145)
SP GR UR STRIP.AUTO: 1.02 (ref 1–1.03)
TRIGL SERPL-MCNC: 79 MG/DL
TSH SERPL DL<=0.05 MIU/L-ACNC: 0.7 UIU/ML (ref 0.36–3.74)
UROBILINOGEN UR QL STRIP.AUTO: 1 E.U./DL
WBC # BLD AUTO: 12.13 THOUSAND/UL (ref 4.31–10.16)
WBC #/AREA URNS AUTO: NORMAL /HPF

## 2019-12-23 PROCEDURE — 85027 COMPLETE CBC AUTOMATED: CPT

## 2019-12-23 PROCEDURE — 84443 ASSAY THYROID STIM HORMONE: CPT

## 2019-12-23 PROCEDURE — 81001 URINALYSIS AUTO W/SCOPE: CPT | Performed by: FAMILY MEDICINE

## 2019-12-23 PROCEDURE — 36415 COLL VENOUS BLD VENIPUNCTURE: CPT | Performed by: FAMILY MEDICINE

## 2019-12-23 PROCEDURE — 80053 COMPREHEN METABOLIC PANEL: CPT

## 2019-12-23 PROCEDURE — 80061 LIPID PANEL: CPT

## 2019-12-23 PROCEDURE — 82306 VITAMIN D 25 HYDROXY: CPT

## 2019-12-23 PROCEDURE — G0103 PSA SCREENING: HCPCS | Performed by: FAMILY MEDICINE

## 2019-12-27 DIAGNOSIS — K21.9 GASTROESOPHAGEAL REFLUX DISEASE, ESOPHAGITIS PRESENCE NOT SPECIFIED: ICD-10-CM

## 2019-12-30 RX ORDER — PANTOPRAZOLE SODIUM 40 MG/1
40 TABLET, DELAYED RELEASE ORAL
Qty: 30 TABLET | Refills: 3 | OUTPATIENT
Start: 2019-12-30 | End: 2020-02-18 | Stop reason: SDUPTHER

## 2020-01-04 ENCOUNTER — APPOINTMENT (OUTPATIENT)
Dept: LAB | Facility: MEDICAL CENTER | Age: 65
End: 2020-01-04
Payer: MEDICARE

## 2020-01-04 DIAGNOSIS — D59.10 ACQUIRED AUTOIMMUNE HEMOLYTIC ANEMIA (HCC): ICD-10-CM

## 2020-01-04 LAB
ERYTHROCYTE [DISTWIDTH] IN BLOOD BY AUTOMATED COUNT: 17.8 % (ref 11.6–15.1)
HCT VFR BLD AUTO: 38.5 % (ref 36.5–49.3)
HGB BLD-MCNC: 14 G/DL (ref 12–17)
MCH RBC QN AUTO: 43.9 PG (ref 26.8–34.3)
MCHC RBC AUTO-ENTMCNC: 36.4 G/DL (ref 31.4–37.4)
MCV RBC AUTO: 121 FL (ref 82–98)
PLATELET # BLD AUTO: 434 THOUSANDS/UL (ref 149–390)
PMV BLD AUTO: 10.5 FL (ref 8.9–12.7)
RBC # BLD AUTO: 3.19 MILLION/UL (ref 3.88–5.62)
WBC # BLD AUTO: 11.57 THOUSAND/UL (ref 4.31–10.16)

## 2020-01-04 PROCEDURE — 85027 COMPLETE CBC AUTOMATED: CPT

## 2020-01-04 PROCEDURE — 36415 COLL VENOUS BLD VENIPUNCTURE: CPT

## 2020-01-14 ENCOUNTER — OFFICE VISIT (OUTPATIENT)
Dept: FAMILY MEDICINE CLINIC | Facility: CLINIC | Age: 65
End: 2020-01-14
Payer: MEDICARE

## 2020-01-14 VITALS
WEIGHT: 197 LBS | SYSTOLIC BLOOD PRESSURE: 140 MMHG | DIASTOLIC BLOOD PRESSURE: 84 MMHG | HEART RATE: 80 BPM | BODY MASS INDEX: 29.86 KG/M2 | RESPIRATION RATE: 16 BRPM | HEIGHT: 68 IN | TEMPERATURE: 97.5 F | OXYGEN SATURATION: 99 %

## 2020-01-14 DIAGNOSIS — N28.9 RENAL INSUFFICIENCY: ICD-10-CM

## 2020-01-14 DIAGNOSIS — I10 ESSENTIAL HYPERTENSION: Primary | ICD-10-CM

## 2020-01-14 DIAGNOSIS — R73.02 GLUCOSE INTOLERANCE (IMPAIRED GLUCOSE TOLERANCE): ICD-10-CM

## 2020-01-14 PROCEDURE — 99213 OFFICE O/P EST LOW 20 MIN: CPT | Performed by: FAMILY MEDICINE

## 2020-01-14 RX ORDER — METOPROLOL SUCCINATE 25 MG/1
25 TABLET, EXTENDED RELEASE ORAL DAILY
Qty: 30 TABLET | Refills: 3 | Status: SHIPPED | OUTPATIENT
Start: 2020-01-14 | End: 2020-01-23

## 2020-01-14 NOTE — PROGRESS NOTES
Assessment/Plan:  Discussed treatment options with patient  Patient will discontinue lisinopril and start metoprolol XL 25 mg daily  Discussed potential side effects  Patient instructed to follow a low sugar and low-carbohydrate diet more carefully to increase fluids in the form of water  Patient to monitor blood pressure at home and call if consistently greater than 150/90  Follow up with specialists as scheduled  Return to the office in 3 months for follow-up appointment and recheck abnormal labs  Diagnoses and all orders for this visit:    Essential hypertension  -     metoprolol succinate (TOPROL-XL) 25 mg 24 hr tablet; Take 1 tablet (25 mg total) by mouth daily    Glucose intolerance (impaired glucose tolerance)    Renal insufficiency          Subjective:      Patient ID: Hetal Peck is a 71 y o  male  Patient is being seen in follow-up for his hypertension and review recent labs revealing elevated blood sugar and elevated creatinine  Patient had increased lisinopril to 20 mg daily 4 weeks ago and complains of worsening side effects including significant fatigue, lightheadedness and palpitations  Patient previously complains of similar side effects on amlodipine  Blood pressure at home remains in the range of 150s over 80s  Hypertension   This is a new problem  The current episode started more than 1 month ago  The problem has been gradually improving since onset  The problem is uncontrolled  Associated symptoms include anxiety, headaches, palpitations and shortness of breath  Pertinent negatives include no blurred vision, chest pain, orthopnea, peripheral edema or PND  Agents associated with hypertension include steroids  Risk factors for coronary artery disease include family history, obesity and male gender  Past treatments include calcium channel blockers and ACE inhibitors  The current treatment provides mild improvement  Compliance problems include medication side effects    There is no history of CAD/MI or CVA  The following portions of the patient's history were reviewed and updated as appropriate: allergies, current medications, past family history, past medical history, past social history, past surgical history and problem list     Review of Systems   Eyes: Negative for blurred vision  Respiratory: Positive for shortness of breath  Cardiovascular: Positive for palpitations  Negative for chest pain, orthopnea and PND  Neurological: Positive for headaches  Objective:      /84   Pulse 80   Temp 97 5 °F (36 4 °C) (Tympanic)   Resp 16   Ht 5' 8" (1 727 m)   Wt 89 4 kg (197 lb)   SpO2 99%   BMI 29 95 kg/m²          Physical Exam   Constitutional: He is oriented to person, place, and time  He appears well-developed and well-nourished  No distress  HENT:   Head: Normocephalic  Mouth/Throat: Oropharynx is clear and moist    Eyes: Conjunctivae are normal  No scleral icterus  Neck: Neck supple  No thyromegaly present  Cardiovascular: Normal rate and regular rhythm  Pulmonary/Chest: Effort normal and breath sounds normal    Abdominal: Soft  There is no tenderness  Musculoskeletal: He exhibits no edema  Lymphadenopathy:     He has no cervical adenopathy  Neurological: He is alert and oriented to person, place, and time  Skin: Skin is warm and dry  Psychiatric: He has a normal mood and affect

## 2020-01-20 ENCOUNTER — TELEPHONE (OUTPATIENT)
Dept: HEMATOLOGY ONCOLOGY | Facility: CLINIC | Age: 65
End: 2020-01-20

## 2020-01-20 ENCOUNTER — APPOINTMENT (OUTPATIENT)
Dept: LAB | Facility: MEDICAL CENTER | Age: 65
End: 2020-01-20
Payer: MEDICARE

## 2020-01-20 DIAGNOSIS — D59.10 ACQUIRED AUTOIMMUNE HEMOLYTIC ANEMIA (HCC): ICD-10-CM

## 2020-01-20 DIAGNOSIS — E83.19 IRON OVERLOAD: ICD-10-CM

## 2020-01-20 LAB
ERYTHROCYTE [DISTWIDTH] IN BLOOD BY AUTOMATED COUNT: 16 % (ref 11.6–15.1)
FERRITIN SERPL-MCNC: 99 NG/ML (ref 8–388)
HCT VFR BLD AUTO: 48 % (ref 36.5–49.3)
HGB BLD-MCNC: 17.2 G/DL (ref 12–17)
MCH RBC QN AUTO: 40.9 PG (ref 26.8–34.3)
MCHC RBC AUTO-ENTMCNC: 35.8 G/DL (ref 31.4–37.4)
MCV RBC AUTO: 114 FL (ref 82–98)
PLATELET # BLD AUTO: 347 THOUSANDS/UL (ref 149–390)
PMV BLD AUTO: 11 FL (ref 8.9–12.7)
RBC # BLD AUTO: 4.21 MILLION/UL (ref 3.88–5.62)
WBC # BLD AUTO: 9.73 THOUSAND/UL (ref 4.31–10.16)

## 2020-01-20 PROCEDURE — 82728 ASSAY OF FERRITIN: CPT

## 2020-01-20 PROCEDURE — 85027 COMPLETE CBC AUTOMATED: CPT

## 2020-01-20 PROCEDURE — 36415 COLL VENOUS BLD VENIPUNCTURE: CPT

## 2020-01-20 NOTE — TELEPHONE ENCOUNTER
Patient called requesting lab results      Labs draw [id number]  Labs drawn at:[facility]  Patient's call back #[phone]

## 2020-01-20 NOTE — TELEPHONE ENCOUNTER
Returned call to patient - most recent ferritin was 99  This is a significant drop in his number [facility]  Patient also has not required any blood transfusions recently  Per Dr Marija Zayas patient can D/C Jadenu for the moment  He will continue to check Ferritin monthly  Recent hgb = 17 2  Patient will decrease prednisone from 15mg PO daily to 10mg PO daily  He reports starting on blood pressure medication recently  This is being followed by his pcp  Patient having nausea and headaches

## 2020-01-20 NOTE — TELEPHONE ENCOUNTER
Task received  Patient called and advised of 1/20/20 lab results  Patient is questioning if he should decrease his Deferasirox (JADENU) 360 MG TABS  Based on today's lab results  Patient's call back # [phone]

## 2020-01-23 ENCOUNTER — OFFICE VISIT (OUTPATIENT)
Dept: FAMILY MEDICINE CLINIC | Facility: CLINIC | Age: 65
End: 2020-01-23
Payer: MEDICARE

## 2020-01-23 VITALS
TEMPERATURE: 97.9 F | WEIGHT: 194 LBS | HEART RATE: 76 BPM | HEIGHT: 67 IN | SYSTOLIC BLOOD PRESSURE: 162 MMHG | OXYGEN SATURATION: 98 % | DIASTOLIC BLOOD PRESSURE: 100 MMHG | RESPIRATION RATE: 16 BRPM | BODY MASS INDEX: 30.45 KG/M2

## 2020-01-23 DIAGNOSIS — I10 ESSENTIAL HYPERTENSION: Primary | ICD-10-CM

## 2020-01-23 PROCEDURE — 99213 OFFICE O/P EST LOW 20 MIN: CPT | Performed by: FAMILY MEDICINE

## 2020-01-23 RX ORDER — HYDROCHLOROTHIAZIDE 25 MG/1
25 TABLET ORAL DAILY
Qty: 30 TABLET | Refills: 5 | Status: SHIPPED | OUTPATIENT
Start: 2020-01-23 | End: 2020-02-18 | Stop reason: SDUPTHER

## 2020-01-23 RX ORDER — METOPROLOL SUCCINATE 25 MG/1
12.5 TABLET, EXTENDED RELEASE ORAL DAILY
Qty: 30 TABLET | Refills: 0 | Status: SHIPPED | OUTPATIENT
Start: 2020-01-23 | End: 2020-02-18 | Stop reason: SDUPTHER

## 2020-01-23 NOTE — PROGRESS NOTES
Assessment/Plan:  Discussed treatment options with patient  Patient will continue metoprolol 12 5 mg daily and will add HCTZ 25 mg daily  Patient to follow a low-salt diet carefully and continue regular exercise as tolerated  Patient to monitor blood pressure at home and call if consistently greater than 150/90  Return to the office in 1 month for appointment and fasting labs  Diagnoses and all orders for this visit:    Essential hypertension  -     metoprolol succinate (TOPROL-XL) 25 mg 24 hr tablet; Take 0 5 tablets (12 5 mg total) by mouth daily  -     hydrochlorothiazide (HYDRODIURIL) 25 mg tablet; Take 1 tablet (25 mg total) by mouth daily          Subjective:      Patient ID: Luis Carlos Kimball is a 71 y o  male  Patient complains of side effects on metoprolol 25 mg daily  He complains of headache, fatigue nausea and stomach pains  Blood pressure was in the 140s over 80s  Past 3 days patient decreased metoprolol 25 mg to half a tablet daily and side effects are improved although blood pressure has been in the range of 165/95  Hypertension   This is a chronic problem  The problem is unchanged  The problem is uncontrolled  Associated symptoms include headaches and malaise/fatigue  Pertinent negatives include no anxiety, blurred vision, chest pain, orthopnea, palpitations, peripheral edema, PND or shortness of breath  Agents associated with hypertension include steroids  Risk factors for coronary artery disease include family history, male gender and obesity  Past treatments include ACE inhibitors, calcium channel blockers and beta blockers  The current treatment provides moderate improvement  Compliance problems include medication side effects  There is no history of CAD/MI or CVA         The following portions of the patient's history were reviewed and updated as appropriate: allergies, current medications, past family history, past medical history, past social history, past surgical history and problem list     Review of Systems   Constitutional: Positive for malaise/fatigue  Eyes: Negative for blurred vision  Respiratory: Negative for shortness of breath  Cardiovascular: Negative for chest pain, palpitations, orthopnea and PND  Neurological: Positive for headaches  Objective:      /100   Pulse 76   Temp 97 9 °F (36 6 °C) (Tympanic)   Resp 16   Ht 5' 7" (1 702 m)   Wt 88 kg (194 lb)   SpO2 98%   BMI 30 38 kg/m²          Physical Exam   Constitutional: He is oriented to person, place, and time  He appears well-developed and well-nourished  No distress  HENT:   Head: Normocephalic  Mouth/Throat: Oropharynx is clear and moist    Eyes: Conjunctivae are normal  No scleral icterus  Neck: Neck supple  No thyromegaly present  Cardiovascular: Normal rate and regular rhythm  Pulmonary/Chest: Effort normal and breath sounds normal    Abdominal: Soft  There is no tenderness  Musculoskeletal: He exhibits no edema  Lymphadenopathy:     He has no cervical adenopathy  Neurological: He is alert and oriented to person, place, and time  Skin: Skin is warm and dry  Psychiatric: He has a normal mood and affect

## 2020-02-01 ENCOUNTER — APPOINTMENT (OUTPATIENT)
Dept: LAB | Facility: MEDICAL CENTER | Age: 65
End: 2020-02-01
Payer: MEDICARE

## 2020-02-01 DIAGNOSIS — D59.10 ACQUIRED AUTOIMMUNE HEMOLYTIC ANEMIA (HCC): ICD-10-CM

## 2020-02-01 LAB
ERYTHROCYTE [DISTWIDTH] IN BLOOD BY AUTOMATED COUNT: 16.4 % (ref 11.6–15.1)
HCT VFR BLD AUTO: 46.8 % (ref 36.5–49.3)
HGB BLD-MCNC: 16.2 G/DL (ref 12–17)
MCH RBC QN AUTO: 39.1 PG (ref 26.8–34.3)
MCHC RBC AUTO-ENTMCNC: 34.6 G/DL (ref 31.4–37.4)
MCV RBC AUTO: 113 FL (ref 82–98)
PLATELET # BLD AUTO: 331 THOUSANDS/UL (ref 149–390)
PMV BLD AUTO: 11.1 FL (ref 8.9–12.7)
RBC # BLD AUTO: 4.14 MILLION/UL (ref 3.88–5.62)
WBC # BLD AUTO: 11.06 THOUSAND/UL (ref 4.31–10.16)

## 2020-02-01 PROCEDURE — 85027 COMPLETE CBC AUTOMATED: CPT

## 2020-02-01 PROCEDURE — 36415 COLL VENOUS BLD VENIPUNCTURE: CPT

## 2020-02-03 ENCOUNTER — TELEPHONE (OUTPATIENT)
Dept: HEMATOLOGY ONCOLOGY | Facility: CLINIC | Age: 65
End: 2020-02-03

## 2020-02-03 NOTE — TELEPHONE ENCOUNTER
Patient called requesting lab results      Labs draw date: 02/01  Labs drawn at: [facility]  Patient's call back #[phone]

## 2020-02-18 ENCOUNTER — OFFICE VISIT (OUTPATIENT)
Dept: FAMILY MEDICINE CLINIC | Facility: CLINIC | Age: 65
End: 2020-02-18
Payer: MEDICARE

## 2020-02-18 VITALS
BODY MASS INDEX: 30.13 KG/M2 | WEIGHT: 192 LBS | HEIGHT: 67 IN | OXYGEN SATURATION: 98 % | SYSTOLIC BLOOD PRESSURE: 132 MMHG | TEMPERATURE: 97.4 F | RESPIRATION RATE: 16 BRPM | DIASTOLIC BLOOD PRESSURE: 88 MMHG | HEART RATE: 80 BPM

## 2020-02-18 DIAGNOSIS — K21.9 GASTROESOPHAGEAL REFLUX DISEASE, ESOPHAGITIS PRESENCE NOT SPECIFIED: ICD-10-CM

## 2020-02-18 DIAGNOSIS — R73.02 GLUCOSE INTOLERANCE (IMPAIRED GLUCOSE TOLERANCE): ICD-10-CM

## 2020-02-18 DIAGNOSIS — N28.9 RENAL INSUFFICIENCY: ICD-10-CM

## 2020-02-18 DIAGNOSIS — I10 ESSENTIAL HYPERTENSION: Primary | ICD-10-CM

## 2020-02-18 PROCEDURE — 3079F DIAST BP 80-89 MM HG: CPT | Performed by: FAMILY MEDICINE

## 2020-02-18 PROCEDURE — 3008F BODY MASS INDEX DOCD: CPT | Performed by: FAMILY MEDICINE

## 2020-02-18 PROCEDURE — 4040F PNEUMOC VAC/ADMIN/RCVD: CPT | Performed by: FAMILY MEDICINE

## 2020-02-18 PROCEDURE — 99213 OFFICE O/P EST LOW 20 MIN: CPT | Performed by: FAMILY MEDICINE

## 2020-02-18 PROCEDURE — 3075F SYST BP GE 130 - 139MM HG: CPT | Performed by: FAMILY MEDICINE

## 2020-02-18 RX ORDER — PANTOPRAZOLE SODIUM 40 MG/1
40 TABLET, DELAYED RELEASE ORAL
Qty: 30 TABLET | Refills: 3 | Status: SHIPPED | OUTPATIENT
Start: 2020-02-18 | End: 2020-06-28

## 2020-02-18 RX ORDER — METOPROLOL SUCCINATE 25 MG/1
12.5 TABLET, EXTENDED RELEASE ORAL DAILY
Qty: 30 TABLET | Refills: 3 | Status: SHIPPED | OUTPATIENT
Start: 2020-02-18 | End: 2020-10-30

## 2020-02-18 RX ORDER — HYDROCHLOROTHIAZIDE 25 MG/1
25 TABLET ORAL DAILY
Qty: 30 TABLET | Refills: 5 | Status: SHIPPED | OUTPATIENT
Start: 2020-02-18 | End: 2020-08-13

## 2020-02-18 NOTE — PROGRESS NOTES
Assessment/Plan:  Patient given lab requisition for fasting BMP and hemoglobin A1c  Will heed results  Patient to continue present treatment  He is instructed to follow a low-fat, low-salt and a low sugar/carbohydrate diet more carefully and get regular aerobic exercise 150 minutes per week  Weight loss encouraged  Patient to monitor blood pressure at home and call if consistently greater than 140/90  Patient to follow up with specialists as scheduled and return to the office in 3 months  Diagnoses and all orders for this visit:    Essential hypertension  -     hydrochlorothiazide (HYDRODIURIL) 25 mg tablet; Take 1 tablet (25 mg total) by mouth daily  -     metoprolol succinate (TOPROL-XL) 25 mg 24 hr tablet; Take 0 5 tablets (12 5 mg total) by mouth daily  -     Basic metabolic panel; Future    Gastroesophageal reflux disease, esophagitis presence not specified  -     pantoprazole (PROTONIX) 40 mg tablet; Take 1 tablet (40 mg total) by mouth daily in the early morning    Glucose intolerance (impaired glucose tolerance)  -     Basic metabolic panel; Future  -     HEMOGLOBIN A1C W/ EAG ESTIMATION; Future    Renal insufficiency  -     Basic metabolic panel; Future          Subjective:      Patient ID: Anton Kennedy is a 71 y o  male  Patient is here for follow-up appointment for chronic conditions and due for fasting labs  He requests lab requisition for fasting labs done at [facility]  Patient has been feeling well overall without side effects on current blood pressure regimen  He has been exercising 5 days a week  Blood pressure at home has been in the range of 130-150 over 80-90  Hypertension   This is a chronic problem  The problem has been gradually improving since onset  Pertinent negatives include no anxiety, blurred vision, chest pain, headaches, orthopnea, palpitations, peripheral edema, PND or shortness of breath   Risk factors for coronary artery disease include family history and male gender  Past treatments include beta blockers and diuretics  The current treatment provides moderate improvement  There are no compliance problems  There is no history of CAD/MI or CVA  The following portions of the patient's history were reviewed and updated as appropriate: allergies, current medications, past family history, past medical history, past social history, past surgical history and problem list     Review of Systems   Eyes: Negative for blurred vision  Respiratory: Negative for shortness of breath  Cardiovascular: Negative for chest pain, palpitations, orthopnea and PND  Neurological: Negative for headaches  Objective:      /88   Pulse 80   Temp (!) 97 4 °F (36 3 °C) (Tympanic)   Resp 16   Ht 5' 7" (1 702 m)   Wt 87 1 kg (192 lb)   SpO2 98%   BMI 30 07 kg/m²          Physical Exam   Constitutional: He is oriented to person, place, and time  He appears well-developed and well-nourished  HENT:   Head: Normocephalic  Mouth/Throat: Oropharynx is clear and moist    Eyes: Conjunctivae are normal  No scleral icterus  Neck: Neck supple  No thyromegaly present  Cardiovascular: Normal rate and regular rhythm  Pulmonary/Chest: Effort normal and breath sounds normal    Abdominal: Soft  There is no tenderness  Musculoskeletal: He exhibits no edema  Lymphadenopathy:     He has no cervical adenopathy  Neurological: He is alert and oriented to person, place, and time  Skin: Skin is warm and dry  Psychiatric: He has a normal mood and affect  BMI Counseling: Body mass index is 30 07 kg/m²  The BMI is above normal  Nutrition recommendations include reducing portion sizes, decreasing overall calorie intake, 3-5 servings of fruits/vegetables daily, reducing fast food intake, consuming healthier snacks, decreasing soda and/or juice intake, moderation in carbohydrate intake and reducing intake of saturated fat and trans fat   Exercise recommendations include moderate aerobic physical activity for 150 minutes/week

## 2020-02-19 ENCOUNTER — APPOINTMENT (OUTPATIENT)
Dept: LAB | Facility: MEDICAL CENTER | Age: 65
End: 2020-02-19
Payer: MEDICARE

## 2020-02-19 ENCOUNTER — TELEPHONE (OUTPATIENT)
Dept: HEMATOLOGY ONCOLOGY | Facility: CLINIC | Age: 65
End: 2020-02-19

## 2020-02-19 ENCOUNTER — TELEPHONE (OUTPATIENT)
Dept: INFUSION CENTER | Facility: HOSPITAL | Age: 65
End: 2020-02-19

## 2020-02-19 DIAGNOSIS — D59.10 ACQUIRED AUTOIMMUNE HEMOLYTIC ANEMIA (HCC): ICD-10-CM

## 2020-02-19 DIAGNOSIS — N28.9 RENAL INSUFFICIENCY: ICD-10-CM

## 2020-02-19 DIAGNOSIS — R73.02 GLUCOSE INTOLERANCE (IMPAIRED GLUCOSE TOLERANCE): ICD-10-CM

## 2020-02-19 DIAGNOSIS — E87.6 HYPOKALEMIA: Primary | ICD-10-CM

## 2020-02-19 DIAGNOSIS — I10 ESSENTIAL HYPERTENSION: ICD-10-CM

## 2020-02-19 LAB
ANION GAP SERPL CALCULATED.3IONS-SCNC: 6 MMOL/L (ref 4–13)
BUN SERPL-MCNC: 23 MG/DL (ref 5–25)
CALCIUM SERPL-MCNC: 9.8 MG/DL (ref 8.3–10.1)
CHLORIDE SERPL-SCNC: 103 MMOL/L (ref 100–108)
CO2 SERPL-SCNC: 32 MMOL/L (ref 21–32)
CREAT SERPL-MCNC: 1.34 MG/DL (ref 0.6–1.3)
ERYTHROCYTE [DISTWIDTH] IN BLOOD BY AUTOMATED COUNT: 18.1 % (ref 11.6–15.1)
EST. AVERAGE GLUCOSE BLD GHB EST-MCNC: 82 MG/DL
GFR SERPL CREATININE-BSD FRML MDRD: 55 ML/MIN/1.73SQ M
GLUCOSE P FAST SERPL-MCNC: 103 MG/DL (ref 65–99)
HBA1C MFR BLD: 4.5 %
HCT VFR BLD AUTO: 50.7 % (ref 36.5–49.3)
HGB BLD-MCNC: 18.3 G/DL (ref 12–17)
MCH RBC QN AUTO: 40.8 PG (ref 26.8–34.3)
MCHC RBC AUTO-ENTMCNC: 36.1 G/DL (ref 31.4–37.4)
MCV RBC AUTO: 113 FL (ref 82–98)
PLATELET # BLD AUTO: 345 THOUSANDS/UL (ref 149–390)
PMV BLD AUTO: 11.2 FL (ref 8.9–12.7)
POTASSIUM SERPL-SCNC: 3.3 MMOL/L (ref 3.5–5.3)
RBC # BLD AUTO: 4.49 MILLION/UL (ref 3.88–5.62)
SODIUM SERPL-SCNC: 141 MMOL/L (ref 136–145)
WBC # BLD AUTO: 10.96 THOUSAND/UL (ref 4.31–10.16)

## 2020-02-19 PROCEDURE — 80048 BASIC METABOLIC PNL TOTAL CA: CPT

## 2020-02-19 PROCEDURE — 36415 COLL VENOUS BLD VENIPUNCTURE: CPT

## 2020-02-19 PROCEDURE — 83036 HEMOGLOBIN GLYCOSYLATED A1C: CPT

## 2020-02-19 PROCEDURE — 85027 COMPLETE CBC AUTOMATED: CPT

## 2020-02-19 RX ORDER — POTASSIUM CHLORIDE 20 MEQ/1
20 TABLET, EXTENDED RELEASE ORAL DAILY
Qty: 30 TABLET | Refills: 5 | Status: SHIPPED | OUTPATIENT
Start: 2020-02-19 | End: 2020-06-29 | Stop reason: SDUPTHER

## 2020-02-19 NOTE — TELEPHONE ENCOUNTER
Patient called to check on lab results from today  I informed patient labs are still processing  Patient understood

## 2020-02-19 NOTE — TELEPHONE ENCOUNTER
Patient calling for hg results   Hg 18 3, will alert Dr Rosendo Sandoval RN of this level   Patient verbalizes understanding of plan,

## 2020-02-20 NOTE — TELEPHONE ENCOUNTER
Spoke with patient and discussed his recent Hg of 18 3  Asked if he was taking any new supplements, and he denied any new supplements  (He has been taking a new BP medication)  Suggested that he may have been slightly dehydrated and may have affected his lab value  Recommended he drink 2 glasses of water prior to his next blood draw  Denied any other issues or c/o

## 2020-02-25 ENCOUNTER — DOCUMENTATION (OUTPATIENT)
Dept: HEMATOLOGY ONCOLOGY | Facility: CLINIC | Age: 65
End: 2020-02-25

## 2020-02-25 NOTE — PROGRESS NOTES
2/25/2020 received notification from [employer] is stating the [employer] [place] needs to be re-authorized  However, the medication is not listed as a medication the pt is currently taking  Reached out to clinical   Per clinical, the pt is no longer taking the Jadenu        Notified [employer] and did not proceed with the auth request

## 2020-03-04 ENCOUNTER — APPOINTMENT (OUTPATIENT)
Dept: LAB | Facility: MEDICAL CENTER | Age: 65
End: 2020-03-04
Payer: MEDICARE

## 2020-03-04 ENCOUNTER — TELEPHONE (OUTPATIENT)
Dept: HEMATOLOGY ONCOLOGY | Facility: MEDICAL CENTER | Age: 65
End: 2020-03-04

## 2020-03-04 DIAGNOSIS — E83.19 IRON OVERLOAD: ICD-10-CM

## 2020-03-04 DIAGNOSIS — D59.10 ACQUIRED AUTOIMMUNE HEMOLYTIC ANEMIA (HCC): ICD-10-CM

## 2020-03-04 LAB
ERYTHROCYTE [DISTWIDTH] IN BLOOD BY AUTOMATED COUNT: 18.1 % (ref 11.6–15.1)
FERRITIN SERPL-MCNC: 457 NG/ML (ref 8–388)
HCT VFR BLD AUTO: 40 % (ref 36.5–49.3)
HGB BLD-MCNC: 14.7 G/DL (ref 12–17)
MCH RBC QN AUTO: 43.6 PG (ref 26.8–34.3)
MCHC RBC AUTO-ENTMCNC: 36.8 G/DL (ref 31.4–37.4)
MCV RBC AUTO: 119 FL (ref 82–98)
PLATELET # BLD AUTO: 395 THOUSANDS/UL (ref 149–390)
PMV BLD AUTO: 11 FL (ref 8.9–12.7)
RBC # BLD AUTO: 3.37 MILLION/UL (ref 3.88–5.62)
WBC # BLD AUTO: 10.03 THOUSAND/UL (ref 4.31–10.16)

## 2020-03-04 PROCEDURE — 36415 COLL VENOUS BLD VENIPUNCTURE: CPT

## 2020-03-04 PROCEDURE — 85027 COMPLETE CBC AUTOMATED: CPT

## 2020-03-04 PROCEDURE — 82728 ASSAY OF FERRITIN: CPT

## 2020-03-10 ENCOUNTER — DOCUMENTATION (OUTPATIENT)
Dept: HEMATOLOGY ONCOLOGY | Facility: CLINIC | Age: 65
End: 2020-03-10

## 2020-03-10 NOTE — PROGRESS NOTES
3/10/2020 received notification from [employer] that [employer] called stating that the jadenu needs authorization    Could not find the medication listed as a current medication for the pt  Confirmed with  Clinical this medication is on hold for now  Notified [employer]  Also called [employer] @ 1:06 ([phone]) spoke with Aaron St  Informed her the dr is holding the order right now

## 2020-03-16 ENCOUNTER — TELEPHONE (OUTPATIENT)
Dept: HEMATOLOGY ONCOLOGY | Facility: CLINIC | Age: 65
End: 2020-03-16

## 2020-03-19 ENCOUNTER — APPOINTMENT (OUTPATIENT)
Dept: LAB | Facility: MEDICAL CENTER | Age: 65
End: 2020-03-19
Payer: MEDICARE

## 2020-03-19 ENCOUNTER — TELEPHONE (OUTPATIENT)
Dept: HEMATOLOGY ONCOLOGY | Facility: CLINIC | Age: 65
End: 2020-03-19

## 2020-03-19 DIAGNOSIS — D59.10 ACQUIRED AUTOIMMUNE HEMOLYTIC ANEMIA (HCC): ICD-10-CM

## 2020-03-19 LAB
ERYTHROCYTE [DISTWIDTH] IN BLOOD BY AUTOMATED COUNT: 17.5 % (ref 11.6–15.1)
HCT VFR BLD AUTO: 41.4 % (ref 36.5–49.3)
HGB BLD-MCNC: 14.9 G/DL (ref 12–17)
MCH RBC QN AUTO: 42.5 PG (ref 26.8–34.3)
MCHC RBC AUTO-ENTMCNC: 36 G/DL (ref 31.4–37.4)
MCV RBC AUTO: 118 FL (ref 82–98)
PLATELET # BLD AUTO: 394 THOUSANDS/UL (ref 149–390)
PMV BLD AUTO: 10.7 FL (ref 8.9–12.7)
RBC # BLD AUTO: 3.51 MILLION/UL (ref 3.88–5.62)
WBC # BLD AUTO: 13.38 THOUSAND/UL (ref 4.31–10.16)

## 2020-03-19 PROCEDURE — 36415 COLL VENOUS BLD VENIPUNCTURE: CPT

## 2020-03-19 PROCEDURE — 85027 COMPLETE CBC AUTOMATED: CPT

## 2020-03-19 NOTE — TELEPHONE ENCOUNTER
Patient called requesting lab results      Labs draw date:  03/19  Labs drawn at:  [facility]   Patient's call back #  [phone]

## 2020-03-24 ENCOUNTER — TELEPHONE (OUTPATIENT)
Dept: HEMATOLOGY ONCOLOGY | Facility: CLINIC | Age: 65
End: 2020-03-24

## 2020-03-24 DIAGNOSIS — D59.10 ACQUIRED AUTOIMMUNE HEMOLYTIC ANEMIA (HCC): Primary | ICD-10-CM

## 2020-03-24 RX ORDER — PREDNISONE 10 MG/1
10 TABLET ORAL DAILY
Qty: 30 TABLET | Refills: 5 | Status: SHIPPED | OUTPATIENT
Start: 2020-03-24 | End: 2020-09-01

## 2020-03-24 NOTE — TELEPHONE ENCOUNTER
Prescription pended to Dr Jose R Castillo  Left message for patient that med was pended to Dr Jose R Castillo

## 2020-03-24 NOTE — TELEPHONE ENCOUNTER
Patient called requesting a refill on:  predniSONE 10 mg tablet           Patient has    pills left   10   [facility]            Patient's call back #[phone]

## 2020-04-16 ENCOUNTER — TELEPHONE (OUTPATIENT)
Dept: HEMATOLOGY ONCOLOGY | Facility: CLINIC | Age: 65
End: 2020-04-16

## 2020-04-16 ENCOUNTER — APPOINTMENT (OUTPATIENT)
Dept: LAB | Facility: MEDICAL CENTER | Age: 65
End: 2020-04-16
Payer: MEDICARE

## 2020-04-16 DIAGNOSIS — D59.10 ACQUIRED AUTOIMMUNE HEMOLYTIC ANEMIA (HCC): ICD-10-CM

## 2020-04-16 LAB
ERYTHROCYTE [DISTWIDTH] IN BLOOD BY AUTOMATED COUNT: 16.5 % (ref 11.6–15.1)
HCT VFR BLD AUTO: 41.9 % (ref 36.5–49.3)
HGB BLD-MCNC: 15.2 G/DL (ref 12–17)
MCH RBC QN AUTO: 41.3 PG (ref 26.8–34.3)
MCHC RBC AUTO-ENTMCNC: 36.3 G/DL (ref 31.4–37.4)
MCV RBC AUTO: 114 FL (ref 82–98)
PLATELET # BLD AUTO: 414 THOUSANDS/UL (ref 149–390)
PMV BLD AUTO: 10.2 FL (ref 8.9–12.7)
RBC # BLD AUTO: 3.68 MILLION/UL (ref 3.88–5.62)
WBC # BLD AUTO: 10.97 THOUSAND/UL (ref 4.31–10.16)

## 2020-04-16 PROCEDURE — 85027 COMPLETE CBC AUTOMATED: CPT

## 2020-04-16 PROCEDURE — 36415 COLL VENOUS BLD VENIPUNCTURE: CPT

## 2020-05-04 ENCOUNTER — OFFICE VISIT (OUTPATIENT)
Dept: FAMILY MEDICINE CLINIC | Facility: CLINIC | Age: 65
End: 2020-05-04
Payer: MEDICARE

## 2020-05-04 VITALS
BODY MASS INDEX: 29.98 KG/M2 | OXYGEN SATURATION: 99 % | DIASTOLIC BLOOD PRESSURE: 88 MMHG | SYSTOLIC BLOOD PRESSURE: 138 MMHG | HEIGHT: 67 IN | WEIGHT: 191 LBS | HEART RATE: 76 BPM | RESPIRATION RATE: 16 BRPM | TEMPERATURE: 97.4 F

## 2020-05-04 DIAGNOSIS — I26.99 OTHER ACUTE PULMONARY EMBOLISM WITHOUT ACUTE COR PULMONALE (HCC): ICD-10-CM

## 2020-05-04 DIAGNOSIS — K21.9 GASTROESOPHAGEAL REFLUX DISEASE WITHOUT ESOPHAGITIS: ICD-10-CM

## 2020-05-04 DIAGNOSIS — I10 ESSENTIAL HYPERTENSION: Primary | ICD-10-CM

## 2020-05-04 DIAGNOSIS — E87.6 HYPOKALEMIA: ICD-10-CM

## 2020-05-04 DIAGNOSIS — Z00.00 MEDICARE ANNUAL WELLNESS VISIT, INITIAL: ICD-10-CM

## 2020-05-04 DIAGNOSIS — D70.8 AUTO IMMUNE NEUTROPENIA (HCC): ICD-10-CM

## 2020-05-04 PROCEDURE — 4040F PNEUMOC VAC/ADMIN/RCVD: CPT | Performed by: FAMILY MEDICINE

## 2020-05-04 PROCEDURE — 3079F DIAST BP 80-89 MM HG: CPT | Performed by: FAMILY MEDICINE

## 2020-05-04 PROCEDURE — 1160F RVW MEDS BY RX/DR IN RCRD: CPT | Performed by: FAMILY MEDICINE

## 2020-05-04 PROCEDURE — 3008F BODY MASS INDEX DOCD: CPT | Performed by: FAMILY MEDICINE

## 2020-05-04 PROCEDURE — 1123F ACP DISCUSS/DSCN MKR DOCD: CPT | Performed by: FAMILY MEDICINE

## 2020-05-04 PROCEDURE — 1170F FXNL STATUS ASSESSED: CPT | Performed by: FAMILY MEDICINE

## 2020-05-04 PROCEDURE — 1125F AMNT PAIN NOTED PAIN PRSNT: CPT | Performed by: FAMILY MEDICINE

## 2020-05-04 PROCEDURE — 99214 OFFICE O/P EST MOD 30 MIN: CPT | Performed by: FAMILY MEDICINE

## 2020-05-04 PROCEDURE — 4004F PT TOBACCO SCREEN RCVD TLK: CPT | Performed by: FAMILY MEDICINE

## 2020-05-04 PROCEDURE — 3075F SYST BP GE 130 - 139MM HG: CPT | Performed by: FAMILY MEDICINE

## 2020-05-04 PROCEDURE — G0438 PPPS, INITIAL VISIT: HCPCS | Performed by: FAMILY MEDICINE

## 2020-05-04 RX ORDER — POTASSIUM CHLORIDE 20 MEQ/1
20 TABLET, EXTENDED RELEASE ORAL DAILY
Qty: 30 TABLET | Refills: 0 | Status: CANCELLED | OUTPATIENT
Start: 2020-05-04

## 2020-05-11 ENCOUNTER — TELEPHONE (OUTPATIENT)
Dept: HEMATOLOGY ONCOLOGY | Facility: CLINIC | Age: 65
End: 2020-05-11

## 2020-05-11 DIAGNOSIS — E83.111 HEMOCHROMATOSIS AFTER MULTIPLE RED BLOOD CELL TRANSFUSIONS: ICD-10-CM

## 2020-05-11 DIAGNOSIS — D59.11 HEMOLYTIC ANEMIA DUE TO WARM ANTIBODY (HCC): Primary | ICD-10-CM

## 2020-05-15 ENCOUNTER — TELEPHONE (OUTPATIENT)
Dept: HEMATOLOGY ONCOLOGY | Facility: CLINIC | Age: 65
End: 2020-05-15

## 2020-05-15 DIAGNOSIS — I26.99 OTHER ACUTE PULMONARY EMBOLISM WITHOUT ACUTE COR PULMONALE (HCC): ICD-10-CM

## 2020-05-15 RX ORDER — ATENOLOL 100 MG/1
TABLET ORAL
Qty: 60 CAPSULE | Refills: 0 | Status: SHIPPED | OUTPATIENT
Start: 2020-05-15 | End: 2020-06-17

## 2020-05-19 ENCOUNTER — APPOINTMENT (OUTPATIENT)
Dept: LAB | Facility: MEDICAL CENTER | Age: 65
End: 2020-05-19
Payer: MEDICARE

## 2020-05-19 ENCOUNTER — TELEPHONE (OUTPATIENT)
Dept: HEMATOLOGY ONCOLOGY | Facility: CLINIC | Age: 65
End: 2020-05-19

## 2020-05-19 DIAGNOSIS — I10 ESSENTIAL HYPERTENSION: ICD-10-CM

## 2020-05-19 LAB
ANION GAP SERPL CALCULATED.3IONS-SCNC: 5 MMOL/L (ref 4–13)
BUN SERPL-MCNC: 15 MG/DL (ref 5–25)
CALCIUM SERPL-MCNC: 9.3 MG/DL (ref 8.3–10.1)
CHLORIDE SERPL-SCNC: 102 MMOL/L (ref 100–108)
CO2 SERPL-SCNC: 30 MMOL/L (ref 21–32)
CREAT SERPL-MCNC: 1.19 MG/DL (ref 0.6–1.3)
FERRITIN SERPL-MCNC: 518 NG/ML (ref 8–388)
GFR SERPL CREATININE-BSD FRML MDRD: 63 ML/MIN/1.73SQ M
GLUCOSE P FAST SERPL-MCNC: 98 MG/DL (ref 65–99)
POTASSIUM SERPL-SCNC: 3.9 MMOL/L (ref 3.5–5.3)
SODIUM SERPL-SCNC: 137 MMOL/L (ref 136–145)

## 2020-05-19 PROCEDURE — 36415 COLL VENOUS BLD VENIPUNCTURE: CPT

## 2020-05-19 PROCEDURE — 82728 ASSAY OF FERRITIN: CPT

## 2020-05-19 PROCEDURE — 80048 BASIC METABOLIC PNL TOTAL CA: CPT

## 2020-06-04 ENCOUNTER — OFFICE VISIT (OUTPATIENT)
Dept: HEMATOLOGY ONCOLOGY | Facility: CLINIC | Age: 65
End: 2020-06-04
Payer: MEDICARE

## 2020-06-04 VITALS
HEIGHT: 67 IN | RESPIRATION RATE: 17 BRPM | DIASTOLIC BLOOD PRESSURE: 74 MMHG | TEMPERATURE: 98.4 F | HEART RATE: 89 BPM | BODY MASS INDEX: 29.73 KG/M2 | WEIGHT: 189.4 LBS | SYSTOLIC BLOOD PRESSURE: 136 MMHG | OXYGEN SATURATION: 98 %

## 2020-06-04 DIAGNOSIS — D59.11 HEMOLYTIC ANEMIA DUE TO WARM ANTIBODY (HCC): Primary | ICD-10-CM

## 2020-06-04 DIAGNOSIS — E83.19 IRON OVERLOAD: ICD-10-CM

## 2020-06-04 PROCEDURE — 3008F BODY MASS INDEX DOCD: CPT | Performed by: INTERNAL MEDICINE

## 2020-06-04 PROCEDURE — 4004F PT TOBACCO SCREEN RCVD TLK: CPT | Performed by: INTERNAL MEDICINE

## 2020-06-04 PROCEDURE — 3075F SYST BP GE 130 - 139MM HG: CPT | Performed by: INTERNAL MEDICINE

## 2020-06-04 PROCEDURE — 99214 OFFICE O/P EST MOD 30 MIN: CPT | Performed by: INTERNAL MEDICINE

## 2020-06-04 PROCEDURE — 3078F DIAST BP <80 MM HG: CPT | Performed by: INTERNAL MEDICINE

## 2020-06-04 PROCEDURE — 4040F PNEUMOC VAC/ADMIN/RCVD: CPT | Performed by: INTERNAL MEDICINE

## 2020-06-04 PROCEDURE — 1160F RVW MEDS BY RX/DR IN RCRD: CPT | Performed by: INTERNAL MEDICINE

## 2020-06-15 ENCOUNTER — TELEPHONE (OUTPATIENT)
Dept: HEMATOLOGY ONCOLOGY | Facility: CLINIC | Age: 65
End: 2020-06-15

## 2020-06-15 ENCOUNTER — DOCUMENTATION (OUTPATIENT)
Dept: HEMATOLOGY ONCOLOGY | Facility: CLINIC | Age: 65
End: 2020-06-15

## 2020-06-16 ENCOUNTER — DOCUMENTATION (OUTPATIENT)
Dept: HEMATOLOGY ONCOLOGY | Facility: CLINIC | Age: 65
End: 2020-06-16

## 2020-06-17 DIAGNOSIS — I26.99 OTHER ACUTE PULMONARY EMBOLISM WITHOUT ACUTE COR PULMONALE (HCC): ICD-10-CM

## 2020-06-17 RX ORDER — ATENOLOL 100 MG/1
TABLET ORAL
Qty: 60 CAPSULE | Refills: 0 | Status: SHIPPED | OUTPATIENT
Start: 2020-06-17 | End: 2020-10-22

## 2020-06-19 ENCOUNTER — TELEPHONE (OUTPATIENT)
Dept: FAMILY MEDICINE CLINIC | Facility: CLINIC | Age: 65
End: 2020-06-19

## 2020-06-22 ENCOUNTER — TELEPHONE (OUTPATIENT)
Dept: HEMATOLOGY ONCOLOGY | Facility: CLINIC | Age: 65
End: 2020-06-22

## 2020-06-23 ENCOUNTER — APPOINTMENT (OUTPATIENT)
Dept: LAB | Facility: MEDICAL CENTER | Age: 65
End: 2020-06-23
Payer: MEDICARE

## 2020-06-23 ENCOUNTER — TELEPHONE (OUTPATIENT)
Dept: HEMATOLOGY ONCOLOGY | Facility: CLINIC | Age: 65
End: 2020-06-23

## 2020-06-23 DIAGNOSIS — E83.19 IRON OVERLOAD: ICD-10-CM

## 2020-06-23 DIAGNOSIS — D59.11 HEMOLYTIC ANEMIA DUE TO WARM ANTIBODY (HCC): ICD-10-CM

## 2020-06-23 LAB
CRP SERPL QL: <3 MG/L
ERYTHROCYTE [SEDIMENTATION RATE] IN BLOOD: 2 MM/HOUR (ref 0–10)
FERRITIN SERPL-MCNC: 332 NG/ML (ref 8–388)
IRON SATN MFR SERPL: 31 %
IRON SERPL-MCNC: 141 UG/DL (ref 65–175)
TIBC SERPL-MCNC: 456 UG/DL (ref 250–450)

## 2020-06-23 PROCEDURE — 85652 RBC SED RATE AUTOMATED: CPT

## 2020-06-23 PROCEDURE — 82728 ASSAY OF FERRITIN: CPT | Performed by: INTERNAL MEDICINE

## 2020-06-23 PROCEDURE — 83540 ASSAY OF IRON: CPT

## 2020-06-23 PROCEDURE — 83550 IRON BINDING TEST: CPT

## 2020-06-23 PROCEDURE — 86140 C-REACTIVE PROTEIN: CPT

## 2020-06-27 DIAGNOSIS — K21.9 GASTROESOPHAGEAL REFLUX DISEASE, ESOPHAGITIS PRESENCE NOT SPECIFIED: ICD-10-CM

## 2020-06-28 RX ORDER — PANTOPRAZOLE SODIUM 40 MG/1
TABLET, DELAYED RELEASE ORAL
Qty: 30 TABLET | Refills: 0 | Status: SHIPPED | OUTPATIENT
Start: 2020-06-28 | End: 2020-07-29

## 2020-06-29 DIAGNOSIS — E87.6 HYPOKALEMIA: ICD-10-CM

## 2020-06-30 RX ORDER — POTASSIUM CHLORIDE 20 MEQ/1
20 TABLET, EXTENDED RELEASE ORAL DAILY
Qty: 30 TABLET | Refills: 0 | Status: SHIPPED | OUTPATIENT
Start: 2020-06-30 | End: 2020-10-23 | Stop reason: SDUPTHER

## 2020-07-07 DIAGNOSIS — E83.111 HEMOCHROMATOSIS AFTER MULTIPLE RED BLOOD CELL TRANSFUSIONS: Primary | ICD-10-CM

## 2020-07-07 LAB — HFE GENE MUT ANL BLD/T: NORMAL

## 2020-07-07 RX ORDER — DEFERASIROX 360 MG/1
TABLET, FILM COATED ORAL
Qty: 90 TABLET | Refills: 3 | Status: SHIPPED | OUTPATIENT
Start: 2020-07-07 | End: 2021-01-26

## 2020-07-28 ENCOUNTER — APPOINTMENT (OUTPATIENT)
Dept: LAB | Facility: MEDICAL CENTER | Age: 65
End: 2020-07-28
Payer: MEDICARE

## 2020-07-29 DIAGNOSIS — K21.9 GASTROESOPHAGEAL REFLUX DISEASE, ESOPHAGITIS PRESENCE NOT SPECIFIED: ICD-10-CM

## 2020-07-29 RX ORDER — PANTOPRAZOLE SODIUM 40 MG/1
TABLET, DELAYED RELEASE ORAL
Qty: 30 TABLET | Refills: 0 | Status: SHIPPED | OUTPATIENT
Start: 2020-07-29 | End: 2020-08-27 | Stop reason: SDUPTHER

## 2020-08-13 DIAGNOSIS — I10 ESSENTIAL HYPERTENSION: ICD-10-CM

## 2020-08-13 RX ORDER — HYDROCHLOROTHIAZIDE 25 MG/1
TABLET ORAL
Qty: 30 TABLET | Refills: 5 | Status: SHIPPED | OUTPATIENT
Start: 2020-08-13 | End: 2021-02-15 | Stop reason: SDUPTHER

## 2020-08-13 NOTE — DISCHARGE SUMMARY
Discharge Summary - [facility]    Patient Information: Mikey Mcgraw 60 y o  male MRN: [id number]  Unit/Bed#: [address] 214-01 Encounter: [id number]    Discharging Physician / Practitioner: NELA Weaver PA-C  PCP: Samanta Galicia DO  Admission Date: 11/6/2017  Discharge Date: 11/09/17    Reason for Admission: short of breath    Discharge Diagnoses:     Principal Problem:    Acute pulmonary embolism ([facility] )  Active Problems:    Tobacco abuse    Hemolytic anemia ([facility] )    GERD (gastroesophageal reflux disease)  Resolved Problems:    Shortness of breath      Consultations During Hospital Stay:  · Dr Pankaj Alvarado - Hematology    Procedures Performed:     · Echo - EF 65%, no wall motion abnormalities, no evidence of R heart strain  · CT chest - There is a filling defect in a subsegmental branch of the right lower lobe pulmonary artery compatible with acute pulmonary embolism  · Venous Duplex - Acute R gastrocnemius R DVT; L normal    Significant Findings / Test Results:     · Pulmonary Embolism, R DVT  · Abnormal CBC, Direct jose guadalupe positive    Incidental Findings:   · none     Test Results Pending at Discharge (will require follow up):   · none     Outpatient Tests Requested:  · none    Complications:  none    Hospital Course:     Mikey Mcgraw is a 60 y o  male patient who originally presented to the hospital on 11/6/2017 due to worsening shortness breath over past 3 days and pain in R calf  Was found to have an acute R pulmonary embolism  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 Some of the tests and screenings are paid in full while other may be subject to a deductible, co-insurance, and/or copay. Some of these benefits include a comprehensive review of your medical history including lifestyle, illnesses that may run in your family, and various assessments and screenings as appropriate. After reviewing your medical record and screening and assessments performed today your provider may have ordered immunizations, labs, imaging, and/or referrals for you. A list of these orders (if applicable) as well as your Preventive Care list are included within your After Visit Summary for your review. Other Preventive Recommendations:    · A preventive eye exam performed by an eye specialist is recommended every 1-2 years to screen for glaucoma; cataracts, macular degeneration, and other eye disorders. · A preventive dental visit is recommended every 6 months. · Try to get at least 150 minutes of exercise per week or 10,000 steps per day on a pedometer . · Order or download the FREE \"Exercise & Physical Activity: Your Everyday Guide\" from [employer]. Call 0-679.403.9144 or search [employer] online. · You need 6149-6533 mg of calcium and 2024-0006 IU of vitamin D per day. It is possible to meet your calcium requirement with diet alone, but a vitamin D supplement is usually necessary to meet this goal.  · When exposed to the sun, use a sunscreen that protects against both UVA and UVB radiation with an SPF of 30 or greater. Reapply every 2 to 3 hours or after sweating, drying off with a towel, or swimming. · Always wear a seat belt when traveling in a car. Always wear a helmet when riding a bicycle or motorcycle. therapy  The patient sees Dr Rosa Bishop at [facility] and is followed locally with Dr Toro Moran  Hemoglobin improved to 9 6  CBC twice weekly upon discharge  3  Leukocytosis secondary to the prednisone  No signs of infection  4   GERD continue PPI      Condition at Discharge: good     Discharge Day Visit / Exam:     Subjective:  Feels better, more energy  No cp/sob/palp/f/c  Still mild R calf pain  Vitals: Blood Pressure: 147/76 (11/09/17 0704)  Pulse: 69 (11/09/17 0704)  Temperature: 98 °F (36 7 °C) (11/09/17 0704)  Temp Source: Tympanic (11/09/17 0704)  Respirations: 16 (11/09/17 0704)  Weight - Scale: 84 8 kg (187 lb) (11/06/17 1511)  SpO2: 96 % (11/09/17 0704)  Exam:   Physical Exam   Constitutional: He is oriented to person, place, and time  He appears well-developed and well-nourished  HENT:   Head: Normocephalic and atraumatic  Eyes: EOM are normal    Cardiovascular: Normal rate, regular rhythm, normal heart sounds and intact distal pulses  Pulmonary/Chest: Effort normal and breath sounds normal    Abdominal: Soft  Bowel sounds are normal    Musculoskeletal: Normal range of motion  He exhibits tenderness  R calf < 1cm nodularity with slight edema and tenderness, no erythema   Neurological: He is alert and oriented to person, place, and time  He has normal reflexes  Skin:   See MS   Psychiatric: He has a normal mood and affect  Discharge instructions/Information to patient and family:   See after visit summary for information provided to patient and family  Provisions for Follow-Up Care:  See after visit summary for information related to follow-up care and any pertinent home health orders  Disposition:     Home    For Discharges to [address] [facility]:   · Not Applicable to this Patient - Not Applicable to this Patient    Planned Readmission: no     Discharge Statement:  I spent 32 minutes discharging the patient   This time was spent on the day of discharge  I had direct contact with the patient on the day of discharge  Greater than 50% of the total time was spent examining patient, answering all patient questions, arranging and discussing plan of care with patient as well as directly providing post-discharge instructions  Additional time then spent on discharge activities  Discharge Medications:  See after visit summary for reconciled discharge medications provided to patient and family        ** Please Note: This note has been constructed using a voice recognition system **

## 2020-08-24 ENCOUNTER — TELEPHONE (OUTPATIENT)
Dept: HEMATOLOGY ONCOLOGY | Facility: CLINIC | Age: 65
End: 2020-08-24

## 2020-08-24 NOTE — TELEPHONE ENCOUNTER
Medication Refill     Who is Calling  Pharmacy    Medication  PROTONIX       How many pills left     Preferred Pharmacy  [facility] [place]    Call back number  [phone]   Relevant Information

## 2020-08-27 ENCOUNTER — TELEPHONE (OUTPATIENT)
Dept: HEMATOLOGY ONCOLOGY | Facility: MEDICAL CENTER | Age: 65
End: 2020-08-27

## 2020-08-27 DIAGNOSIS — K21.9 GASTROESOPHAGEAL REFLUX DISEASE, ESOPHAGITIS PRESENCE NOT SPECIFIED: ICD-10-CM

## 2020-08-27 RX ORDER — PANTOPRAZOLE SODIUM 40 MG/1
40 TABLET, DELAYED RELEASE ORAL DAILY
Qty: 90 TABLET | Refills: 3 | Status: SHIPPED | OUTPATIENT
Start: 2020-08-27

## 2020-08-27 NOTE — TELEPHONE ENCOUNTER
Medication Refill     Who is Calling  PATIENT    Medication  pantoprazole (PROTONIX) 40 mg tablet        How many pills left  2   Preferred Pharmacy [id number]     Call back number  [phone]   Relevant Information  WOULD 60 DAY REFILL         2nd request

## 2020-08-28 DIAGNOSIS — D59.10 ACQUIRED AUTOIMMUNE HEMOLYTIC ANEMIA (HCC): ICD-10-CM

## 2020-08-28 RX ORDER — PREDNISONE 10 MG/1
10 TABLET ORAL DAILY
Qty: 30 TABLET | Refills: 0 | Status: CANCELLED | OUTPATIENT
Start: 2020-08-28

## 2020-09-01 ENCOUNTER — TELEPHONE (OUTPATIENT)
Dept: HEMATOLOGY ONCOLOGY | Facility: CLINIC | Age: 65
End: 2020-09-01

## 2020-09-01 ENCOUNTER — APPOINTMENT (OUTPATIENT)
Dept: LAB | Facility: MEDICAL CENTER | Age: 65
End: 2020-09-01
Payer: MEDICARE

## 2020-09-01 DIAGNOSIS — E83.19 IRON OVERLOAD: ICD-10-CM

## 2020-09-01 DIAGNOSIS — D59.10 ACQUIRED AUTOIMMUNE HEMOLYTIC ANEMIA (HCC): ICD-10-CM

## 2020-09-01 DIAGNOSIS — D59.10 ACQUIRED AUTOIMMUNE HEMOLYTIC ANEMIA (HCC): Primary | ICD-10-CM

## 2020-09-01 RX ORDER — PREDNISONE 2.5 MG
TABLET ORAL
Qty: 30 TABLET | Refills: 5 | OUTPATIENT
Start: 2020-09-01 | End: 2020-09-28 | Stop reason: SDUPTHER

## 2020-09-01 RX ORDER — PREDNISONE 10 MG/1
TABLET ORAL
Qty: 30 TABLET | Refills: 4 | OUTPATIENT
Start: 2020-09-01 | End: 2020-12-10

## 2020-09-01 RX ORDER — PREDNISONE 10 MG/1
10 TABLET ORAL DAILY
Qty: 91 TABLET | Refills: 0 | Status: SHIPPED | OUTPATIENT
Start: 2020-09-01 | End: 2020-09-28 | Stop reason: SDUPTHER

## 2020-09-08 ENCOUNTER — TELEPHONE (OUTPATIENT)
Dept: FAMILY MEDICINE CLINIC | Facility: CLINIC | Age: 65
End: 2020-09-08

## 2020-09-08 NOTE — TELEPHONE ENCOUNTER
Pt called to schedule his flu shot, and is asking if he needs to have another pneumonia shot  Please advise  Thank you

## 2020-09-08 NOTE — TELEPHONE ENCOUNTER
Patient does not need pneumonia vaccine as he already had Prevnar 13 and Pneumovax 23  Please notify patient

## 2020-09-10 ENCOUNTER — CLINICAL SUPPORT (OUTPATIENT)
Dept: FAMILY MEDICINE CLINIC | Facility: CLINIC | Age: 65
End: 2020-09-10
Payer: MEDICARE

## 2020-09-10 DIAGNOSIS — Z23 FLU VACCINE NEED: Primary | ICD-10-CM

## 2020-09-10 PROCEDURE — G0008 ADMIN INFLUENZA VIRUS VAC: HCPCS

## 2020-09-10 PROCEDURE — 90662 IIV NO PRSV INCREASED AG IM: CPT

## 2020-09-10 NOTE — PROGRESS NOTES
Patient presents today for influenza vaccine 0 5 ML left deltoid intramuscularly  patient tolerated well

## 2020-09-16 ENCOUNTER — TELEPHONE (OUTPATIENT)
Dept: HEMATOLOGY ONCOLOGY | Facility: CLINIC | Age: 65
End: 2020-09-16

## 2020-09-16 NOTE — TELEPHONE ENCOUNTER
Received Clearance form from [facility]    Email has been sent to [facility]

## 2020-09-27 DIAGNOSIS — D59.10 ACQUIRED AUTOIMMUNE HEMOLYTIC ANEMIA (HCC): ICD-10-CM

## 2020-09-27 RX ORDER — PREDNISONE 10 MG/1
10 TABLET ORAL DAILY
Qty: 91 TABLET | Refills: 0 | Status: CANCELLED | OUTPATIENT
Start: 2020-09-27 | End: 2020-12-27

## 2020-09-27 RX ORDER — PREDNISONE 2.5 MG
TABLET ORAL
Qty: 30 TABLET | Refills: 0 | Status: CANCELLED | OUTPATIENT
Start: 2020-09-27

## 2020-09-28 DIAGNOSIS — D59.10 ACQUIRED AUTOIMMUNE HEMOLYTIC ANEMIA (HCC): Primary | ICD-10-CM

## 2020-09-28 RX ORDER — PREDNISONE 10 MG/1
TABLET ORAL
Qty: 91 TABLET | Refills: 1 | Status: SHIPPED | OUTPATIENT
Start: 2020-09-28 | End: 2020-12-01

## 2020-09-28 RX ORDER — PREDNISONE 2.5 MG
TABLET ORAL
Qty: 30 TABLET | Refills: 5 | OUTPATIENT
Start: 2020-09-28 | End: 2020-09-30 | Stop reason: SDUPTHER

## 2020-09-30 ENCOUNTER — TELEPHONE (OUTPATIENT)
Dept: HEMATOLOGY ONCOLOGY | Facility: CLINIC | Age: 65
End: 2020-09-30

## 2020-09-30 DIAGNOSIS — D59.10 ACQUIRED AUTOIMMUNE HEMOLYTIC ANEMIA (HCC): ICD-10-CM

## 2020-09-30 DIAGNOSIS — D59.10 ACQUIRED AUTOIMMUNE HEMOLYTIC ANEMIA (HCC): Primary | ICD-10-CM

## 2020-09-30 RX ORDER — PREDNISONE 2.5 MG
TABLET ORAL
Qty: 30 TABLET | Refills: 5 | Status: SHIPPED | OUTPATIENT
Start: 2020-09-30 | End: 2020-12-10

## 2020-09-30 RX ORDER — PREDNISONE 2.5 MG
TABLET ORAL
Qty: 30 TABLET | Refills: 5 | OUTPATIENT
Start: 2020-09-30 | End: 2020-12-01

## 2020-09-30 NOTE — TELEPHONE ENCOUNTER
Will re pend 2 5 mg to Dr Anna Ho for signature - looks like this did not go through to the pharmacy    The 10mg did    Left a msg for patient explaining this    FYI

## 2020-09-30 NOTE — TELEPHONE ENCOUNTER
Patient called stating that Michelle did not received the predniSONE 2 5 mg tablet order but did received the 10mg dosage  Best call back [phone]

## 2020-10-07 ENCOUNTER — TELEPHONE (OUTPATIENT)
Dept: HEMATOLOGY ONCOLOGY | Facility: CLINIC | Age: 65
End: 2020-10-07

## 2020-10-07 ENCOUNTER — APPOINTMENT (OUTPATIENT)
Dept: LAB | Facility: MEDICAL CENTER | Age: 65
End: 2020-10-07
Payer: MEDICARE

## 2020-10-07 DIAGNOSIS — E83.19 IRON OVERLOAD: ICD-10-CM

## 2020-10-07 LAB — FERRITIN SERPL-MCNC: 180 NG/ML (ref 8–388)

## 2020-10-07 PROCEDURE — 82728 ASSAY OF FERRITIN: CPT

## 2020-10-07 RX ORDER — PREDNISONE 10 MG/1
TABLET ORAL
Qty: 30 TABLET | Refills: 0 | OUTPATIENT
Start: 2020-10-07

## 2020-10-21 DIAGNOSIS — I26.99 OTHER ACUTE PULMONARY EMBOLISM WITHOUT ACUTE COR PULMONALE (HCC): ICD-10-CM

## 2020-10-21 RX ORDER — DABIGATRAN ETEXILATE 150 MG/1
150 CAPSULE, COATED PELLETS ORAL 2 TIMES DAILY
Qty: 60 CAPSULE | Refills: 0 | Status: CANCELLED | OUTPATIENT
Start: 2020-10-21

## 2020-10-22 ENCOUNTER — TELEPHONE (OUTPATIENT)
Dept: HEMATOLOGY ONCOLOGY | Facility: CLINIC | Age: 65
End: 2020-10-22

## 2020-10-22 DIAGNOSIS — I26.99 OTHER ACUTE PULMONARY EMBOLISM WITHOUT ACUTE COR PULMONALE (HCC): ICD-10-CM

## 2020-10-22 RX ORDER — DABIGATRAN ETEXILATE 150 MG/1
150 CAPSULE, COATED PELLETS ORAL 2 TIMES DAILY
Qty: 60 CAPSULE | Refills: 6 | OUTPATIENT
Start: 2020-10-22

## 2020-10-22 RX ORDER — ATENOLOL 100 MG/1
TABLET ORAL
Qty: 60 CAPSULE | Refills: 0 | Status: SHIPPED | OUTPATIENT
Start: 2020-10-22 | End: 2020-11-10 | Stop reason: SDUPTHER

## 2020-10-22 RX ORDER — DABIGATRAN ETEXILATE 150 MG/1
150 CAPSULE, COATED PELLETS ORAL 2 TIMES DAILY
Qty: 60 CAPSULE | Refills: 4 | Status: SHIPPED | OUTPATIENT
Start: 2020-10-22 | End: 2021-11-19 | Stop reason: HOSPADM

## 2020-10-23 DIAGNOSIS — E87.6 HYPOKALEMIA: ICD-10-CM

## 2020-10-23 RX ORDER — POTASSIUM CHLORIDE 20 MEQ/1
20 TABLET, EXTENDED RELEASE ORAL DAILY
Qty: 30 TABLET | Refills: 0 | Status: SHIPPED | OUTPATIENT
Start: 2020-10-23 | End: 2020-12-01 | Stop reason: SDUPTHER

## 2020-10-30 DIAGNOSIS — I10 ESSENTIAL HYPERTENSION: ICD-10-CM

## 2020-10-30 RX ORDER — METOPROLOL SUCCINATE 25 MG/1
TABLET, EXTENDED RELEASE ORAL
Qty: 30 TABLET | Refills: 5 | Status: SHIPPED | OUTPATIENT
Start: 2020-10-30 | End: 2021-01-04 | Stop reason: SDUPTHER

## 2020-11-10 ENCOUNTER — OFFICE VISIT (OUTPATIENT)
Dept: FAMILY MEDICINE CLINIC | Facility: CLINIC | Age: 65
End: 2020-11-10
Payer: MEDICARE

## 2020-11-10 VITALS
RESPIRATION RATE: 16 BRPM | DIASTOLIC BLOOD PRESSURE: 82 MMHG | SYSTOLIC BLOOD PRESSURE: 138 MMHG | TEMPERATURE: 97.6 F | WEIGHT: 189 LBS | HEART RATE: 76 BPM | HEIGHT: 67 IN | BODY MASS INDEX: 29.66 KG/M2 | OXYGEN SATURATION: 99 %

## 2020-11-10 DIAGNOSIS — D59.10 AUTOIMMUNE HEMOLYTIC ANEMIA (HCC): ICD-10-CM

## 2020-11-10 DIAGNOSIS — I10 ESSENTIAL HYPERTENSION: Primary | ICD-10-CM

## 2020-11-10 DIAGNOSIS — K21.9 GASTROESOPHAGEAL REFLUX DISEASE WITHOUT ESOPHAGITIS: ICD-10-CM

## 2020-11-10 DIAGNOSIS — M15.9 PRIMARY OSTEOARTHRITIS INVOLVING MULTIPLE JOINTS: ICD-10-CM

## 2020-11-10 DIAGNOSIS — Z12.5 SCREENING PSA (PROSTATE SPECIFIC ANTIGEN): ICD-10-CM

## 2020-11-10 PROBLEM — M25.569 PAIN IN JOINT, LOWER LEG: Status: ACTIVE | Noted: 2020-11-10

## 2020-11-10 PROBLEM — M19.90 OSTEOARTHRITIS: Status: ACTIVE | Noted: 2020-09-03

## 2020-11-10 PROBLEM — M25.562 PAIN IN LEFT KNEE: Status: ACTIVE | Noted: 2020-09-03

## 2020-11-10 PROCEDURE — 99214 OFFICE O/P EST MOD 30 MIN: CPT | Performed by: FAMILY MEDICINE

## 2020-11-17 ENCOUNTER — TELEPHONE (OUTPATIENT)
Dept: HEMATOLOGY ONCOLOGY | Facility: CLINIC | Age: 65
End: 2020-11-17

## 2020-11-17 DIAGNOSIS — D59.11 HEMOLYTIC ANEMIA DUE TO WARM ANTIBODY (HCC): ICD-10-CM

## 2020-11-17 DIAGNOSIS — E83.19 IRON OVERLOAD: Primary | ICD-10-CM

## 2020-11-17 DIAGNOSIS — Z79.899 OTHER LONG TERM (CURRENT) DRUG THERAPY: ICD-10-CM

## 2020-11-17 DIAGNOSIS — I26.99 OTHER ACUTE PULMONARY EMBOLISM WITHOUT ACUTE COR PULMONALE (HCC): ICD-10-CM

## 2020-11-17 RX ORDER — DABIGATRAN ETEXILATE 150 MG/1
150 CAPSULE, COATED PELLETS ORAL 2 TIMES DAILY
Qty: 60 CAPSULE | Refills: 4 | Status: CANCELLED | OUTPATIENT
Start: 2020-11-17

## 2020-11-30 ENCOUNTER — TELEPHONE (OUTPATIENT)
Dept: HEMATOLOGY ONCOLOGY | Facility: CLINIC | Age: 65
End: 2020-11-30

## 2020-11-30 ENCOUNTER — LAB (OUTPATIENT)
Dept: LAB | Facility: MEDICAL CENTER | Age: 65
End: 2020-11-30
Payer: MEDICARE

## 2020-11-30 DIAGNOSIS — I10 ESSENTIAL HYPERTENSION: ICD-10-CM

## 2020-11-30 LAB
ALBUMIN SERPL BCP-MCNC: 4.5 G/DL (ref 3.5–5)
ALP SERPL-CCNC: 69 U/L (ref 46–116)
ALT SERPL W P-5'-P-CCNC: 31 U/L (ref 12–78)
ANION GAP SERPL CALCULATED.3IONS-SCNC: 8 MMOL/L (ref 4–13)
AST SERPL W P-5'-P-CCNC: 26 U/L (ref 5–45)
BACTERIA UR QL AUTO: ABNORMAL /HPF
BILIRUB SERPL-MCNC: 2.58 MG/DL (ref 0.2–1)
BILIRUB UR QL STRIP: ABNORMAL
BUN SERPL-MCNC: 18 MG/DL (ref 5–25)
CALCIUM SERPL-MCNC: 10.3 MG/DL (ref 8.3–10.1)
CHLORIDE SERPL-SCNC: 100 MMOL/L (ref 100–108)
CHOLEST SERPL-MCNC: 248 MG/DL (ref 50–200)
CLARITY UR: ABNORMAL
CO2 SERPL-SCNC: 29 MMOL/L (ref 21–32)
COLOR UR: ABNORMAL
CREAT SERPL-MCNC: 1.39 MG/DL (ref 0.6–1.3)
FERRITIN SERPL-MCNC: 427 NG/ML (ref 8–388)
GFR SERPL CREATININE-BSD FRML MDRD: 52 ML/MIN/1.73SQ M
GLUCOSE P FAST SERPL-MCNC: 95 MG/DL (ref 65–99)
GLUCOSE UR STRIP-MCNC: NEGATIVE MG/DL
HDLC SERPL-MCNC: 100 MG/DL
HGB UR QL STRIP.AUTO: ABNORMAL
HYALINE CASTS #/AREA URNS LPF: ABNORMAL /LPF
KETONES UR STRIP-MCNC: ABNORMAL MG/DL
LDLC SERPL CALC-MCNC: 127 MG/DL (ref 0–100)
LEUKOCYTE ESTERASE UR QL STRIP: NEGATIVE
MUCOUS THREADS UR QL AUTO: ABNORMAL
NITRITE UR QL STRIP: NEGATIVE
NON-SQ EPI CELLS URNS QL MICRO: ABNORMAL /HPF
NONHDLC SERPL-MCNC: 148 MG/DL
PH UR STRIP.AUTO: 5 [PH]
POTASSIUM SERPL-SCNC: 3.2 MMOL/L (ref 3.5–5.3)
PROT SERPL-MCNC: 7.4 G/DL (ref 6.4–8.2)
PROT UR STRIP-MCNC: ABNORMAL MG/DL
PSA SERPL-MCNC: 0.8 NG/ML (ref 0–4)
RBC #/AREA URNS AUTO: ABNORMAL /HPF
SODIUM SERPL-SCNC: 137 MMOL/L (ref 136–145)
SP GR UR STRIP.AUTO: 1.02 (ref 1–1.03)
TRIGL SERPL-MCNC: 106 MG/DL
TSH SERPL DL<=0.05 MIU/L-ACNC: 2.18 UIU/ML (ref 0.36–3.74)
UROBILINOGEN UR QL STRIP.AUTO: 0.2 E.U./DL
WBC #/AREA URNS AUTO: ABNORMAL /HPF

## 2020-11-30 PROCEDURE — 80053 COMPREHEN METABOLIC PANEL: CPT

## 2020-11-30 PROCEDURE — 84443 ASSAY THYROID STIM HORMONE: CPT

## 2020-11-30 PROCEDURE — 82728 ASSAY OF FERRITIN: CPT

## 2020-11-30 PROCEDURE — G0103 PSA SCREENING: HCPCS | Performed by: FAMILY MEDICINE

## 2020-11-30 PROCEDURE — 81001 URINALYSIS AUTO W/SCOPE: CPT | Performed by: FAMILY MEDICINE

## 2020-11-30 PROCEDURE — 80061 LIPID PANEL: CPT

## 2020-12-01 ENCOUNTER — CONSULT (OUTPATIENT)
Dept: FAMILY MEDICINE CLINIC | Facility: CLINIC | Age: 65
End: 2020-12-01
Payer: MEDICARE

## 2020-12-01 VITALS
TEMPERATURE: 96.8 F | HEART RATE: 84 BPM | SYSTOLIC BLOOD PRESSURE: 140 MMHG | HEIGHT: 67 IN | OXYGEN SATURATION: 99 % | WEIGHT: 191 LBS | RESPIRATION RATE: 16 BRPM | BODY MASS INDEX: 29.98 KG/M2 | DIASTOLIC BLOOD PRESSURE: 84 MMHG

## 2020-12-01 DIAGNOSIS — I26.99 OTHER PULMONARY EMBOLISM WITHOUT ACUTE COR PULMONALE, UNSPECIFIED CHRONICITY (HCC): ICD-10-CM

## 2020-12-01 DIAGNOSIS — Z01.818 PREOPERATIVE CLEARANCE: Primary | ICD-10-CM

## 2020-12-01 DIAGNOSIS — M17.0 PRIMARY OSTEOARTHRITIS OF BOTH KNEES: ICD-10-CM

## 2020-12-01 DIAGNOSIS — D59.10 AUTOIMMUNE HEMOLYTIC ANEMIA (HCC): ICD-10-CM

## 2020-12-01 DIAGNOSIS — I10 ESSENTIAL HYPERTENSION: ICD-10-CM

## 2020-12-01 DIAGNOSIS — E87.6 HYPOKALEMIA: ICD-10-CM

## 2020-12-01 PROBLEM — M17.10 OSTEOARTHRITIS OF KNEE: Status: ACTIVE | Noted: 2020-09-03

## 2020-12-01 PROBLEM — M17.9 OSTEOARTHRITIS OF KNEE: Status: ACTIVE | Noted: 2020-09-03

## 2020-12-01 PROCEDURE — 99214 OFFICE O/P EST MOD 30 MIN: CPT | Performed by: FAMILY MEDICINE

## 2020-12-01 PROCEDURE — 93000 ELECTROCARDIOGRAM COMPLETE: CPT | Performed by: FAMILY MEDICINE

## 2020-12-01 RX ORDER — POTASSIUM CHLORIDE 20 MEQ/1
20 TABLET, EXTENDED RELEASE ORAL DAILY
Qty: 30 TABLET | Refills: 3 | Status: SHIPPED | OUTPATIENT
Start: 2020-12-01 | End: 2020-12-29 | Stop reason: SDUPTHER

## 2020-12-02 DIAGNOSIS — Z01.818 PREOP EXAMINATION: Primary | ICD-10-CM

## 2020-12-03 ENCOUNTER — APPOINTMENT (OUTPATIENT)
Dept: RADIOLOGY | Facility: MEDICAL CENTER | Age: 65
End: 2020-12-03
Payer: MEDICARE

## 2020-12-03 DIAGNOSIS — Z01.818 PREOPERATIVE CLEARANCE: ICD-10-CM

## 2020-12-03 PROCEDURE — 71046 X-RAY EXAM CHEST 2 VIEWS: CPT

## 2020-12-07 ENCOUNTER — DOCUMENTATION (OUTPATIENT)
Dept: HEMATOLOGY ONCOLOGY | Facility: CLINIC | Age: 65
End: 2020-12-07

## 2020-12-10 ENCOUNTER — ANESTHESIA EVENT (OUTPATIENT)
Dept: PERIOP | Facility: HOSPITAL | Age: 65
DRG: 470 | End: 2020-12-10
Payer: MEDICARE

## 2020-12-10 NOTE — PRE-PROCEDURE INSTRUCTIONS
Pre-Surgery Instructions:   Medication Instructions    dabigatran etexilate (Pradaxa) 150 mg capsu Patient was instructed by Physician and understands   Deferasirox (Jadenu) 360 MG TABS Instructed patient per Anesthesia Guidelines   hydrochlorothiazide (HYDRODIURIL) 25 mg tablet Instructed patient per Anesthesia Guidelines   metoprolol succinate (TOPROL-XL) 25 mg 24 hr tablet Instructed patient per Anesthesia Guidelines   pantoprazole (PROTONIX) 40 mg tablet Instructed patient per Anesthesia Guidelines   potassium chloride (K-DUR,KLOR-CON) 20 mEq tablet Instructed patient per Anesthesia Guidelines   PREDNISONE PO Instructed patient per Anesthesia Guidelines   VITAMIN D PO Instructed patient per Anesthesia Guidelines   VITAMIN E PO Instructed patient per Anesthesia Guidelines  Pt instructed to take protonix, toprol, and prednisone the morning of surgery with a small sip of water  [facility] preop instructions reviewed with pt  Pt has surgical soap  Incentive spirometer reviewed  Joint information reviewed

## 2020-12-14 ENCOUNTER — TELEPHONE (OUTPATIENT)
Dept: HEMATOLOGY ONCOLOGY | Facility: MEDICAL CENTER | Age: 65
End: 2020-12-14

## 2020-12-15 ENCOUNTER — TRANSCRIBE ORDERS (OUTPATIENT)
Dept: ADMINISTRATIVE | Facility: HOSPITAL | Age: 65
End: 2020-12-15

## 2020-12-15 ENCOUNTER — LAB (OUTPATIENT)
Dept: LAB | Facility: MEDICAL CENTER | Age: 65
DRG: 470 | End: 2020-12-15
Payer: MEDICARE

## 2020-12-15 DIAGNOSIS — E87.6 HYPOKALEMIA: ICD-10-CM

## 2020-12-15 DIAGNOSIS — Z01.818 PREOP EXAMINATION: ICD-10-CM

## 2020-12-15 DIAGNOSIS — D59.10 ANEMIA, HEMOLYTIC, AUTOIMMUNE (HCC): ICD-10-CM

## 2020-12-15 DIAGNOSIS — E83.19 IRON OVERLOAD: ICD-10-CM

## 2020-12-15 DIAGNOSIS — D59.10 ANEMIA, HEMOLYTIC, AUTOIMMUNE (HCC): Primary | ICD-10-CM

## 2020-12-15 LAB
ANION GAP SERPL CALCULATED.3IONS-SCNC: 11 MMOL/L (ref 4–13)
BUN SERPL-MCNC: 34 MG/DL (ref 5–25)
CALCIUM SERPL-MCNC: 9.7 MG/DL (ref 8.3–10.1)
CHLORIDE SERPL-SCNC: 99 MMOL/L (ref 100–108)
CO2 SERPL-SCNC: 26 MMOL/L (ref 21–32)
CREAT SERPL-MCNC: 1.85 MG/DL (ref 0.6–1.3)
ERYTHROCYTE [DISTWIDTH] IN BLOOD BY AUTOMATED COUNT: 13.8 % (ref 11.6–15.1)
FERRITIN SERPL-MCNC: ABNORMAL NG/ML (ref 8–388)
GFR SERPL CREATININE-BSD FRML MDRD: 37 ML/MIN/1.73SQ M
GLUCOSE P FAST SERPL-MCNC: 102 MG/DL (ref 65–99)
HCT VFR BLD AUTO: 35.1 % (ref 36.5–49.3)
HGB BLD-MCNC: 12.4 G/DL (ref 12–17)
MCH RBC QN AUTO: 41.5 PG (ref 26.8–34.3)
MCHC RBC AUTO-ENTMCNC: 35.3 G/DL (ref 31.4–37.4)
MCV RBC AUTO: 117 FL (ref 82–98)
PLATELET # BLD AUTO: 432 THOUSANDS/UL (ref 149–390)
PMV BLD AUTO: 10.8 FL (ref 8.9–12.7)
POTASSIUM SERPL-SCNC: 3.5 MMOL/L (ref 3.5–5.3)
RBC # BLD AUTO: 2.99 MILLION/UL (ref 3.88–5.62)
SODIUM SERPL-SCNC: 136 MMOL/L (ref 136–145)
WBC # BLD AUTO: 13.24 THOUSAND/UL (ref 4.31–10.16)

## 2020-12-15 PROCEDURE — 82728 ASSAY OF FERRITIN: CPT

## 2020-12-15 PROCEDURE — 36415 COLL VENOUS BLD VENIPUNCTURE: CPT

## 2020-12-15 PROCEDURE — 80048 BASIC METABOLIC PNL TOTAL CA: CPT

## 2020-12-15 PROCEDURE — U0003 INFECTIOUS AGENT DETECTION BY NUCLEIC ACID (DNA OR RNA); SEVERE ACUTE RESPIRATORY SYNDROME CORONAVIRUS 2 (SARS-COV-2) (CORONAVIRUS DISEASE [COVID-19]), AMPLIFIED PROBE TECHNIQUE, MAKING USE OF HIGH THROUGHPUT TECHNOLOGIES AS DESCRIBED BY CMS-2020-01-R: HCPCS | Performed by: PHYSICIAN ASSISTANT

## 2020-12-15 PROCEDURE — 85027 COMPLETE CBC AUTOMATED: CPT

## 2020-12-16 LAB — SARS-COV-2 RNA SPEC QL NAA+PROBE: DETECTED

## 2020-12-17 ENCOUNTER — TELEMEDICINE (OUTPATIENT)
Dept: HEMATOLOGY ONCOLOGY | Facility: CLINIC | Age: 65
End: 2020-12-17
Payer: MEDICARE

## 2020-12-17 DIAGNOSIS — E83.19 IRON OVERLOAD: ICD-10-CM

## 2020-12-17 DIAGNOSIS — D59.11 HEMOLYTIC ANEMIA DUE TO WARM ANTIBODY (HCC): Primary | ICD-10-CM

## 2020-12-17 PROCEDURE — 99215 OFFICE O/P EST HI 40 MIN: CPT | Performed by: INTERNAL MEDICINE

## 2020-12-18 ENCOUNTER — APPOINTMENT (EMERGENCY)
Dept: RADIOLOGY | Facility: HOSPITAL | Age: 65
DRG: 177 | End: 2020-12-18
Payer: MEDICARE

## 2020-12-18 ENCOUNTER — HOSPITAL ENCOUNTER (INPATIENT)
Facility: HOSPITAL | Age: 65
LOS: 4 days | Discharge: HOME | DRG: 177 | End: 2020-12-22
Attending: EMERGENCY MEDICINE | Admitting: STUDENT IN AN ORGANIZED HEALTH CARE EDUCATION/TRAINING PROGRAM
Payer: MEDICARE

## 2020-12-18 DIAGNOSIS — D59.11 HEMOLYTIC ANEMIA DUE TO WARM ANTIBODY (HCC): ICD-10-CM

## 2020-12-18 DIAGNOSIS — N17.9 AKI (ACUTE KIDNEY INJURY) (HCC): ICD-10-CM

## 2020-12-18 DIAGNOSIS — D64.9 ANEMIA: ICD-10-CM

## 2020-12-18 DIAGNOSIS — R06.02 SHORTNESS OF BREATH: ICD-10-CM

## 2020-12-18 DIAGNOSIS — U07.1 COVID-19 VIRUS INFECTION: Primary | ICD-10-CM

## 2020-12-18 DIAGNOSIS — E87.6 HYPOKALEMIA: ICD-10-CM

## 2020-12-18 PROBLEM — Z86.711 HX OF PULMONARY EMBOLUS: Status: ACTIVE | Noted: 2017-06-06

## 2020-12-18 LAB
ALBUMIN SERPL BCP-MCNC: 3.7 G/DL (ref 3.5–5)
ALP SERPL-CCNC: 84 U/L (ref 46–116)
ALT SERPL W P-5'-P-CCNC: 34 U/L (ref 12–78)
ANION GAP SERPL CALCULATED.3IONS-SCNC: 11 MMOL/L (ref 4–13)
ANISOCYTOSIS BLD QL SMEAR: PRESENT
APTT PPP: 43 SECONDS (ref 23–37)
AST SERPL W P-5'-P-CCNC: 95 U/L (ref 5–45)
ATRIAL RATE: 80 BPM
BASOPHILS # BLD MANUAL: 0 THOUSAND/UL (ref 0–0.1)
BASOPHILS NFR MAR MANUAL: 0 % (ref 0–1)
BILIRUB SERPL-MCNC: 1.7 MG/DL (ref 0.2–1)
BUN SERPL-MCNC: 42 MG/DL (ref 5–25)
CALCIUM SERPL-MCNC: 8.8 MG/DL (ref 8.3–10.1)
CHLORIDE SERPL-SCNC: 95 MMOL/L (ref 100–108)
CO2 SERPL-SCNC: 25 MMOL/L (ref 21–32)
CREAT SERPL-MCNC: 2.07 MG/DL (ref 0.6–1.3)
EOSINOPHIL # BLD MANUAL: 0 THOUSAND/UL (ref 0–0.4)
EOSINOPHIL NFR BLD MANUAL: 0 % (ref 0–6)
ERYTHROCYTE [DISTWIDTH] IN BLOOD BY AUTOMATED COUNT: 14 % (ref 11.6–15.1)
GFR SERPL CREATININE-BSD FRML MDRD: 32 ML/MIN/1.73SQ M
GLUCOSE SERPL-MCNC: 115 MG/DL (ref 65–140)
HCT VFR BLD AUTO: 29 % (ref 36.5–49.3)
HGB BLD-MCNC: 10.4 G/DL (ref 12–17)
INR PPP: 1.36 (ref 0.84–1.19)
LACTATE SERPL-SCNC: 1.3 MMOL/L (ref 0.5–2)
LIPASE SERPL-CCNC: 478 U/L (ref 73–393)
LYMPHOCYTES # BLD AUTO: 0.5 THOUSAND/UL (ref 0.6–4.47)
LYMPHOCYTES # BLD AUTO: 4 % (ref 14–44)
MACROCYTES BLD QL AUTO: PRESENT
MCH RBC QN AUTO: 40.9 PG (ref 26.8–34.3)
MCHC RBC AUTO-ENTMCNC: 35.9 G/DL (ref 31.4–37.4)
MCV RBC AUTO: 114 FL (ref 82–98)
MONOCYTES # BLD AUTO: 1.87 THOUSAND/UL (ref 0–1.22)
MONOCYTES NFR BLD: 15 % (ref 4–12)
NEUTROPHILS # BLD MANUAL: 9.73 THOUSAND/UL (ref 1.85–7.62)
NEUTS BAND NFR BLD MANUAL: 3 % (ref 0–8)
NEUTS SEG NFR BLD AUTO: 75 % (ref 43–75)
NRBC BLD AUTO-RTO: 6 /100 WBC (ref 0–2)
NRBC BLD AUTO-RTO: 7 /100 WBCS
NT-PROBNP SERPL-MCNC: 228 PG/ML
P AXIS: 43 DEGREES
PLATELET # BLD AUTO: 554 THOUSANDS/UL (ref 149–390)
PLATELET BLD QL SMEAR: ABNORMAL
PMV BLD AUTO: 9.9 FL (ref 8.9–12.7)
POTASSIUM SERPL-SCNC: 3 MMOL/L (ref 3.5–5.3)
PR INTERVAL: 154 MS
PROT SERPL-MCNC: 7 G/DL (ref 6.4–8.2)
PROTHROMBIN TIME: 16.5 SECONDS (ref 11.6–14.5)
QRS AXIS: 28 DEGREES
QRSD INTERVAL: 84 MS
QT INTERVAL: 358 MS
QTC INTERVAL: 412 MS
RBC # BLD AUTO: 2.54 MILLION/UL (ref 3.88–5.62)
SODIUM SERPL-SCNC: 131 MMOL/L (ref 136–145)
T WAVE AXIS: 5 DEGREES
TOTAL CELLS COUNTED SPEC: 100
TROPONIN I SERPL-MCNC: <0.02 NG/ML
TSH SERPL DL<=0.05 MIU/L-ACNC: 1.43 UIU/ML (ref 0.36–3.74)
VARIANT LYMPHS # BLD AUTO: 3 %
VENTRICULAR RATE: 80 BPM
WBC # BLD AUTO: 12.48 THOUSAND/UL (ref 4.31–10.16)

## 2020-12-18 PROCEDURE — 93005 ELECTROCARDIOGRAM TRACING: CPT

## 2020-12-18 PROCEDURE — 93010 ELECTROCARDIOGRAM REPORT: CPT | Performed by: INTERNAL MEDICINE

## 2020-12-18 PROCEDURE — 85027 COMPLETE CBC AUTOMATED: CPT | Performed by: PHYSICIAN ASSISTANT

## 2020-12-18 PROCEDURE — 86900 BLOOD TYPING SEROLOGIC ABO: CPT | Performed by: PHYSICIAN ASSISTANT

## 2020-12-18 PROCEDURE — 86850 RBC ANTIBODY SCREEN: CPT | Performed by: PHYSICIAN ASSISTANT

## 2020-12-18 PROCEDURE — 84484 ASSAY OF TROPONIN QUANT: CPT | Performed by: PHYSICIAN ASSISTANT

## 2020-12-18 PROCEDURE — 99222 1ST HOSP IP/OBS MODERATE 55: CPT | Performed by: STUDENT IN AN ORGANIZED HEALTH CARE EDUCATION/TRAINING PROGRAM

## 2020-12-18 PROCEDURE — 80053 COMPREHEN METABOLIC PANEL: CPT | Performed by: PHYSICIAN ASSISTANT

## 2020-12-18 PROCEDURE — 83605 ASSAY OF LACTIC ACID: CPT | Performed by: PHYSICIAN ASSISTANT

## 2020-12-18 PROCEDURE — 36415 COLL VENOUS BLD VENIPUNCTURE: CPT | Performed by: PHYSICIAN ASSISTANT

## 2020-12-18 PROCEDURE — 85730 THROMBOPLASTIN TIME PARTIAL: CPT | Performed by: PHYSICIAN ASSISTANT

## 2020-12-18 PROCEDURE — 87040 BLOOD CULTURE FOR BACTERIA: CPT | Performed by: PHYSICIAN ASSISTANT

## 2020-12-18 PROCEDURE — 99285 EMERGENCY DEPT VISIT HI MDM: CPT | Performed by: PHYSICIAN ASSISTANT

## 2020-12-18 PROCEDURE — 84443 ASSAY THYROID STIM HORMONE: CPT | Performed by: PHYSICIAN ASSISTANT

## 2020-12-18 PROCEDURE — 83690 ASSAY OF LIPASE: CPT | Performed by: PHYSICIAN ASSISTANT

## 2020-12-18 PROCEDURE — 86901 BLOOD TYPING SEROLOGIC RH(D): CPT | Performed by: PHYSICIAN ASSISTANT

## 2020-12-18 PROCEDURE — 85007 BL SMEAR W/DIFF WBC COUNT: CPT | Performed by: PHYSICIAN ASSISTANT

## 2020-12-18 PROCEDURE — 99285 EMERGENCY DEPT VISIT HI MDM: CPT

## 2020-12-18 PROCEDURE — 85610 PROTHROMBIN TIME: CPT | Performed by: PHYSICIAN ASSISTANT

## 2020-12-18 PROCEDURE — 71045 X-RAY EXAM CHEST 1 VIEW: CPT

## 2020-12-18 PROCEDURE — 86870 RBC ANTIBODY IDENTIFICATION: CPT | Performed by: STUDENT IN AN ORGANIZED HEALTH CARE EDUCATION/TRAINING PROGRAM

## 2020-12-18 PROCEDURE — XW033E5 INTRODUCTION OF REMDESIVIR ANTI-INFECTIVE INTO PERIPHERAL VEIN, PERCUTANEOUS APPROACH, NEW TECHNOLOGY GROUP 5: ICD-10-PCS | Performed by: STUDENT IN AN ORGANIZED HEALTH CARE EDUCATION/TRAINING PROGRAM

## 2020-12-18 PROCEDURE — 83880 ASSAY OF NATRIURETIC PEPTIDE: CPT | Performed by: PHYSICIAN ASSISTANT

## 2020-12-18 RX ORDER — MELATONIN
2000 DAILY
Status: DISCONTINUED | OUTPATIENT
Start: 2020-12-18 | End: 2020-12-22 | Stop reason: HOSPADM

## 2020-12-18 RX ORDER — MULTIVITAMIN/IRON/FOLIC ACID 18MG-0.4MG
1 TABLET ORAL DAILY
Status: DISCONTINUED | OUTPATIENT
Start: 2020-12-25 | End: 2020-12-22 | Stop reason: HOSPADM

## 2020-12-18 RX ORDER — DABIGATRAN ETEXILATE 150 MG/1
150 CAPSULE, COATED PELLETS ORAL 2 TIMES DAILY
Status: DISCONTINUED | OUTPATIENT
Start: 2020-12-18 | End: 2020-12-22 | Stop reason: HOSPADM

## 2020-12-18 RX ORDER — BENZONATATE 100 MG/1
100 CAPSULE ORAL 3 TIMES DAILY PRN
Status: DISCONTINUED | OUTPATIENT
Start: 2020-12-18 | End: 2020-12-19

## 2020-12-18 RX ORDER — METOPROLOL SUCCINATE 25 MG/1
12.5 TABLET, EXTENDED RELEASE ORAL DAILY
Status: DISCONTINUED | OUTPATIENT
Start: 2020-12-18 | End: 2020-12-22 | Stop reason: HOSPADM

## 2020-12-18 RX ORDER — DEFERASIROX 360 MG/1
1 TABLET, FILM COATED ORAL DAILY
Status: DISCONTINUED | OUTPATIENT
Start: 2020-12-18 | End: 2020-12-22 | Stop reason: HOSPADM

## 2020-12-18 RX ORDER — POTASSIUM CHLORIDE 20 MEQ/1
40 TABLET, EXTENDED RELEASE ORAL ONCE
Status: COMPLETED | OUTPATIENT
Start: 2020-12-18 | End: 2020-12-18

## 2020-12-18 RX ORDER — ASCORBIC ACID 500 MG
1000 TABLET ORAL EVERY 12 HOURS SCHEDULED
Status: DISCONTINUED | OUTPATIENT
Start: 2020-12-18 | End: 2020-12-22 | Stop reason: HOSPADM

## 2020-12-18 RX ORDER — POTASSIUM CHLORIDE 20 MEQ/1
20 TABLET, EXTENDED RELEASE ORAL DAILY
Status: DISCONTINUED | OUTPATIENT
Start: 2020-12-18 | End: 2020-12-22 | Stop reason: HOSPADM

## 2020-12-18 RX ORDER — SODIUM CHLORIDE 9 MG/ML
75 INJECTION, SOLUTION INTRAVENOUS CONTINUOUS
Status: DISPENSED | OUTPATIENT
Start: 2020-12-18 | End: 2020-12-20

## 2020-12-18 RX ORDER — PANTOPRAZOLE SODIUM 40 MG/1
40 TABLET, DELAYED RELEASE ORAL
Status: DISCONTINUED | OUTPATIENT
Start: 2020-12-19 | End: 2020-12-22 | Stop reason: HOSPADM

## 2020-12-18 RX ORDER — ZINC SULFATE 50(220)MG
220 CAPSULE ORAL DAILY
Status: DISCONTINUED | OUTPATIENT
Start: 2020-12-18 | End: 2020-12-22 | Stop reason: HOSPADM

## 2020-12-18 RX ADMIN — POTASSIUM CHLORIDE 40 MEQ: 1500 TABLET, EXTENDED RELEASE ORAL at 16:49

## 2020-12-18 RX ADMIN — REMDESIVIR 200 MG: 100 INJECTION, POWDER, LYOPHILIZED, FOR SOLUTION INTRAVENOUS at 21:28

## 2020-12-18 RX ADMIN — ZINC SULFATE 220 MG (50 MG) CAPSULE 220 MG: CAPSULE at 17:45

## 2020-12-18 RX ADMIN — PREDNISONE 12.5 MG: 2.5 TABLET ORAL at 17:45

## 2020-12-18 RX ADMIN — BENZONATATE 100 MG: 100 CAPSULE ORAL at 17:58

## 2020-12-18 RX ADMIN — OXYCODONE HYDROCHLORIDE AND ACETAMINOPHEN 1000 MG: 500 TABLET ORAL at 21:28

## 2020-12-18 RX ADMIN — SODIUM CHLORIDE 125 ML/HR: 0.9 INJECTION, SOLUTION INTRAVENOUS at 18:10

## 2020-12-18 RX ADMIN — Medication 2000 UNITS: at 17:45

## 2020-12-18 RX ADMIN — DABIGATRAN ETEXILATE MESYLATE 150 MG: 150 CAPSULE ORAL at 19:28

## 2020-12-18 RX ADMIN — SODIUM CHLORIDE 1000 ML: 0.9 INJECTION, SOLUTION INTRAVENOUS at 13:00

## 2020-12-19 LAB
ALBUMIN SERPL BCP-MCNC: 3.3 G/DL (ref 3.5–5)
ALP SERPL-CCNC: 77 U/L (ref 46–116)
ALT SERPL W P-5'-P-CCNC: 30 U/L (ref 12–78)
ANION GAP SERPL CALCULATED.3IONS-SCNC: 12 MMOL/L (ref 4–13)
AST SERPL W P-5'-P-CCNC: 85 U/L (ref 5–45)
BILIRUB SERPL-MCNC: 1.16 MG/DL (ref 0.2–1)
BUN SERPL-MCNC: 36 MG/DL (ref 5–25)
CALCIUM ALBUM COR SERPL-MCNC: 9.1 MG/DL (ref 8.3–10.1)
CALCIUM SERPL-MCNC: 8.5 MG/DL (ref 8.3–10.1)
CHLORIDE SERPL-SCNC: 100 MMOL/L (ref 100–108)
CO2 SERPL-SCNC: 23 MMOL/L (ref 21–32)
CREAT SERPL-MCNC: 1.75 MG/DL (ref 0.6–1.3)
CREAT UR-MCNC: 146.8 MG/DL
CRP SERPL QL: 86.3 MG/L
D DIMER PPP FEU-MCNC: 3.38 UG/ML FEU
DAT C3-SP REAG RBC QL: NORMAL
DAT IGG-SP REAG RBCCO QL: NORMAL
DAT POLY-SP REAG RBC QL: NORMAL
ERYTHROCYTE [DISTWIDTH] IN BLOOD BY AUTOMATED COUNT: 14.2 % (ref 11.6–15.1)
ERYTHROCYTE [SEDIMENTATION RATE] IN BLOOD: 12 MM/HOUR (ref 0–19)
FERRITIN SERPL-MCNC: ABNORMAL NG/ML (ref 8–388)
GFR SERPL CREATININE-BSD FRML MDRD: 40 ML/MIN/1.73SQ M
GLUCOSE SERPL-MCNC: 97 MG/DL (ref 65–140)
HCT VFR BLD AUTO: 27.6 % (ref 36.5–49.3)
HGB BLD-MCNC: 9.7 G/DL (ref 12–17)
LDH SERPL-CCNC: 3992 U/L (ref 81–234)
MAGNESIUM SERPL-MCNC: 2 MG/DL (ref 1.6–2.6)
MCH RBC QN AUTO: 40.6 PG (ref 26.8–34.3)
MCHC RBC AUTO-ENTMCNC: 35.1 G/DL (ref 31.4–37.4)
MCV RBC AUTO: 116 FL (ref 82–98)
NRBC BLD AUTO-RTO: 9 /100 WBCS
OSMOLALITY UR: 637 MMOL/KG
PHOSPHATE SERPL-MCNC: 3.1 MG/DL (ref 2.3–4.1)
PLATELET # BLD AUTO: 517 THOUSANDS/UL (ref 149–390)
PMV BLD AUTO: 9.9 FL (ref 8.9–12.7)
POTASSIUM SERPL-SCNC: 3.7 MMOL/L (ref 3.5–5.3)
PROCALCITONIN SERPL-MCNC: 0.19 NG/ML
PROT SERPL-MCNC: 6.5 G/DL (ref 6.4–8.2)
RBC # BLD AUTO: 2.39 MILLION/UL (ref 3.88–5.62)
RETICS # AUTO: ABNORMAL 10*3/UL (ref 14356–105094)
RETICS # CALC: 1.93 % (ref 0.37–1.87)
SODIUM 24H UR-SCNC: 63 MOL/L
SODIUM SERPL-SCNC: 135 MMOL/L (ref 136–145)
UUN 24H UR-MCNC: 987 MG/DL
WBC # BLD AUTO: 9.3 THOUSAND/UL (ref 4.31–10.16)

## 2020-12-19 PROCEDURE — 84145 PROCALCITONIN (PCT): CPT | Performed by: STUDENT IN AN ORGANIZED HEALTH CARE EDUCATION/TRAINING PROGRAM

## 2020-12-19 PROCEDURE — 85027 COMPLETE CBC AUTOMATED: CPT | Performed by: STUDENT IN AN ORGANIZED HEALTH CARE EDUCATION/TRAINING PROGRAM

## 2020-12-19 PROCEDURE — 85652 RBC SED RATE AUTOMATED: CPT | Performed by: STUDENT IN AN ORGANIZED HEALTH CARE EDUCATION/TRAINING PROGRAM

## 2020-12-19 PROCEDURE — 83735 ASSAY OF MAGNESIUM: CPT | Performed by: STUDENT IN AN ORGANIZED HEALTH CARE EDUCATION/TRAINING PROGRAM

## 2020-12-19 PROCEDURE — 84100 ASSAY OF PHOSPHORUS: CPT | Performed by: STUDENT IN AN ORGANIZED HEALTH CARE EDUCATION/TRAINING PROGRAM

## 2020-12-19 PROCEDURE — 84300 ASSAY OF URINE SODIUM: CPT | Performed by: STUDENT IN AN ORGANIZED HEALTH CARE EDUCATION/TRAINING PROGRAM

## 2020-12-19 PROCEDURE — 80053 COMPREHEN METABOLIC PANEL: CPT | Performed by: STUDENT IN AN ORGANIZED HEALTH CARE EDUCATION/TRAINING PROGRAM

## 2020-12-19 PROCEDURE — 84540 ASSAY OF URINE/UREA-N: CPT | Performed by: STUDENT IN AN ORGANIZED HEALTH CARE EDUCATION/TRAINING PROGRAM

## 2020-12-19 PROCEDURE — 86140 C-REACTIVE PROTEIN: CPT | Performed by: STUDENT IN AN ORGANIZED HEALTH CARE EDUCATION/TRAINING PROGRAM

## 2020-12-19 PROCEDURE — 86880 COOMBS TEST DIRECT: CPT | Performed by: PHYSICIAN ASSISTANT

## 2020-12-19 PROCEDURE — 82570 ASSAY OF URINE CREATININE: CPT | Performed by: STUDENT IN AN ORGANIZED HEALTH CARE EDUCATION/TRAINING PROGRAM

## 2020-12-19 PROCEDURE — 85379 FIBRIN DEGRADATION QUANT: CPT | Performed by: STUDENT IN AN ORGANIZED HEALTH CARE EDUCATION/TRAINING PROGRAM

## 2020-12-19 PROCEDURE — 82728 ASSAY OF FERRITIN: CPT | Performed by: STUDENT IN AN ORGANIZED HEALTH CARE EDUCATION/TRAINING PROGRAM

## 2020-12-19 PROCEDURE — 85045 AUTOMATED RETICULOCYTE COUNT: CPT | Performed by: PHYSICIAN ASSISTANT

## 2020-12-19 PROCEDURE — 83615 LACTATE (LD) (LDH) ENZYME: CPT | Performed by: PHYSICIAN ASSISTANT

## 2020-12-19 PROCEDURE — 83935 ASSAY OF URINE OSMOLALITY: CPT | Performed by: STUDENT IN AN ORGANIZED HEALTH CARE EDUCATION/TRAINING PROGRAM

## 2020-12-19 PROCEDURE — 99232 SBSQ HOSP IP/OBS MODERATE 35: CPT | Performed by: STUDENT IN AN ORGANIZED HEALTH CARE EDUCATION/TRAINING PROGRAM

## 2020-12-19 RX ORDER — BENZONATATE 100 MG/1
100 CAPSULE ORAL 3 TIMES DAILY
Status: DISCONTINUED | OUTPATIENT
Start: 2020-12-19 | End: 2020-12-22 | Stop reason: HOSPADM

## 2020-12-19 RX ORDER — METHYLPREDNISOLONE SODIUM SUCCINATE 40 MG/ML
40 INJECTION, POWDER, LYOPHILIZED, FOR SOLUTION INTRAMUSCULAR; INTRAVENOUS DAILY
Status: DISCONTINUED | OUTPATIENT
Start: 2020-12-19 | End: 2020-12-22 | Stop reason: HOSPADM

## 2020-12-19 RX ADMIN — BENZONATATE 100 MG: 100 CAPSULE ORAL at 12:31

## 2020-12-19 RX ADMIN — BENZONATATE 100 MG: 100 CAPSULE ORAL at 17:17

## 2020-12-19 RX ADMIN — Medication 2000 UNITS: at 08:43

## 2020-12-19 RX ADMIN — POTASSIUM CHLORIDE 20 MEQ: 1500 TABLET, EXTENDED RELEASE ORAL at 08:44

## 2020-12-19 RX ADMIN — DABIGATRAN ETEXILATE MESYLATE 150 MG: 150 CAPSULE ORAL at 22:10

## 2020-12-19 RX ADMIN — METOPROLOL SUCCINATE 12.5 MG: 25 TABLET, EXTENDED RELEASE ORAL at 08:43

## 2020-12-19 RX ADMIN — ZINC SULFATE 220 MG (50 MG) CAPSULE 220 MG: CAPSULE at 08:50

## 2020-12-19 RX ADMIN — FOLIC ACID 1 MG: 5 INJECTION, SOLUTION INTRAMUSCULAR; INTRAVENOUS; SUBCUTANEOUS at 14:02

## 2020-12-19 RX ADMIN — PREDNISONE 12.5 MG: 2.5 TABLET ORAL at 08:43

## 2020-12-19 RX ADMIN — OXYCODONE HYDROCHLORIDE AND ACETAMINOPHEN 1000 MG: 500 TABLET ORAL at 08:43

## 2020-12-19 RX ADMIN — DABIGATRAN ETEXILATE MESYLATE 150 MG: 150 CAPSULE ORAL at 14:02

## 2020-12-19 RX ADMIN — BENZONATATE 100 MG: 100 CAPSULE ORAL at 22:10

## 2020-12-19 RX ADMIN — REMDESIVIR 100 MG: 100 INJECTION, POWDER, LYOPHILIZED, FOR SOLUTION INTRAVENOUS at 22:11

## 2020-12-19 RX ADMIN — PANTOPRAZOLE SODIUM 40 MG: 40 TABLET, DELAYED RELEASE ORAL at 05:01

## 2020-12-19 RX ADMIN — OXYCODONE HYDROCHLORIDE AND ACETAMINOPHEN 1000 MG: 500 TABLET ORAL at 22:10

## 2020-12-19 RX ADMIN — SODIUM CHLORIDE 75 ML/HR: 0.9 INJECTION, SOLUTION INTRAVENOUS at 12:50

## 2020-12-19 RX ADMIN — METHYLPREDNISOLONE SODIUM SUCCINATE 40 MG: 40 INJECTION, POWDER, FOR SOLUTION INTRAMUSCULAR; INTRAVENOUS at 12:46

## 2020-12-20 PROBLEM — E87.6 HYPOKALEMIA: Status: RESOLVED | Noted: 2020-12-18 | Resolved: 2020-12-20

## 2020-12-20 LAB
ALBUMIN SERPL BCP-MCNC: 2.9 G/DL (ref 3.5–5)
ALP SERPL-CCNC: 60 U/L (ref 46–116)
ALT SERPL W P-5'-P-CCNC: 23 U/L (ref 12–78)
ANION GAP SERPL CALCULATED.3IONS-SCNC: 12 MMOL/L (ref 4–13)
AST SERPL W P-5'-P-CCNC: 59 U/L (ref 5–45)
BILIRUB DIRECT SERPL-MCNC: 0.27 MG/DL (ref 0–0.2)
BILIRUB SERPL-MCNC: 0.83 MG/DL (ref 0.2–1)
BUN SERPL-MCNC: 28 MG/DL (ref 5–25)
CALCIUM ALBUM COR SERPL-MCNC: 9.5 MG/DL (ref 8.3–10.1)
CALCIUM SERPL-MCNC: 8.6 MG/DL (ref 8.3–10.1)
CHLORIDE SERPL-SCNC: 104 MMOL/L (ref 100–108)
CO2 SERPL-SCNC: 23 MMOL/L (ref 21–32)
CREAT SERPL-MCNC: 1.21 MG/DL (ref 0.6–1.3)
ERYTHROCYTE [DISTWIDTH] IN BLOOD BY AUTOMATED COUNT: 13.4 % (ref 11.6–15.1)
GFR SERPL CREATININE-BSD FRML MDRD: 62 ML/MIN/1.73SQ M
GLUCOSE SERPL-MCNC: 102 MG/DL (ref 65–140)
HCT VFR BLD AUTO: 27.4 % (ref 36.5–49.3)
HGB BLD-MCNC: 9.1 G/DL (ref 12–17)
LDH SERPL-CCNC: 1083 U/L (ref 81–234)
MCH RBC QN AUTO: 38.7 PG (ref 26.8–34.3)
MCHC RBC AUTO-ENTMCNC: 33.2 G/DL (ref 31.4–37.4)
MCV RBC AUTO: 117 FL (ref 82–98)
PLATELET # BLD AUTO: 532 THOUSANDS/UL (ref 149–390)
PMV BLD AUTO: 10.2 FL (ref 8.9–12.7)
POTASSIUM SERPL-SCNC: 3.6 MMOL/L (ref 3.5–5.3)
PROCALCITONIN SERPL-MCNC: 0.09 NG/ML
PROT SERPL-MCNC: 5.7 G/DL (ref 6.4–8.2)
RBC # BLD AUTO: 2.35 MILLION/UL (ref 3.88–5.62)
SODIUM SERPL-SCNC: 139 MMOL/L (ref 136–145)
WBC # BLD AUTO: 8.44 THOUSAND/UL (ref 4.31–10.16)

## 2020-12-20 PROCEDURE — 85027 COMPLETE CBC AUTOMATED: CPT | Performed by: PHYSICIAN ASSISTANT

## 2020-12-20 PROCEDURE — 80053 COMPREHEN METABOLIC PANEL: CPT | Performed by: PHYSICIAN ASSISTANT

## 2020-12-20 PROCEDURE — 84145 PROCALCITONIN (PCT): CPT | Performed by: STUDENT IN AN ORGANIZED HEALTH CARE EDUCATION/TRAINING PROGRAM

## 2020-12-20 PROCEDURE — 82248 BILIRUBIN DIRECT: CPT | Performed by: STUDENT IN AN ORGANIZED HEALTH CARE EDUCATION/TRAINING PROGRAM

## 2020-12-20 PROCEDURE — NC001 PR NO CHARGE: Performed by: INTERNAL MEDICINE

## 2020-12-20 PROCEDURE — 83010 ASSAY OF HAPTOGLOBIN QUANT: CPT | Performed by: STUDENT IN AN ORGANIZED HEALTH CARE EDUCATION/TRAINING PROGRAM

## 2020-12-20 PROCEDURE — 99232 SBSQ HOSP IP/OBS MODERATE 35: CPT | Performed by: STUDENT IN AN ORGANIZED HEALTH CARE EDUCATION/TRAINING PROGRAM

## 2020-12-20 PROCEDURE — 83615 LACTATE (LD) (LDH) ENZYME: CPT | Performed by: STUDENT IN AN ORGANIZED HEALTH CARE EDUCATION/TRAINING PROGRAM

## 2020-12-20 RX ADMIN — BENZONATATE 100 MG: 100 CAPSULE ORAL at 08:36

## 2020-12-20 RX ADMIN — PANTOPRAZOLE SODIUM 40 MG: 40 TABLET, DELAYED RELEASE ORAL at 05:28

## 2020-12-20 RX ADMIN — SODIUM CHLORIDE 75 ML/HR: 0.9 INJECTION, SOLUTION INTRAVENOUS at 02:26

## 2020-12-20 RX ADMIN — Medication 2000 UNITS: at 08:36

## 2020-12-20 RX ADMIN — DABIGATRAN ETEXILATE MESYLATE 150 MG: 150 CAPSULE ORAL at 17:19

## 2020-12-20 RX ADMIN — METOPROLOL SUCCINATE 12.5 MG: 25 TABLET, EXTENDED RELEASE ORAL at 08:36

## 2020-12-20 RX ADMIN — OXYCODONE HYDROCHLORIDE AND ACETAMINOPHEN 1000 MG: 500 TABLET ORAL at 21:42

## 2020-12-20 RX ADMIN — DABIGATRAN ETEXILATE MESYLATE 150 MG: 150 CAPSULE ORAL at 08:37

## 2020-12-20 RX ADMIN — BENZONATATE 100 MG: 100 CAPSULE ORAL at 21:43

## 2020-12-20 RX ADMIN — ZINC SULFATE 220 MG (50 MG) CAPSULE 220 MG: CAPSULE at 08:36

## 2020-12-20 RX ADMIN — OXYCODONE HYDROCHLORIDE AND ACETAMINOPHEN 1000 MG: 500 TABLET ORAL at 08:36

## 2020-12-20 RX ADMIN — REMDESIVIR 100 MG: 100 INJECTION, POWDER, LYOPHILIZED, FOR SOLUTION INTRAVENOUS at 21:44

## 2020-12-20 RX ADMIN — BENZONATATE 100 MG: 100 CAPSULE ORAL at 16:14

## 2020-12-20 RX ADMIN — METHYLPREDNISOLONE SODIUM SUCCINATE 40 MG: 40 INJECTION, POWDER, FOR SOLUTION INTRAMUSCULAR; INTRAVENOUS at 08:37

## 2020-12-20 RX ADMIN — POTASSIUM CHLORIDE 20 MEQ: 1500 TABLET, EXTENDED RELEASE ORAL at 08:36

## 2020-12-21 LAB
ABO GROUP BLD: NORMAL
BLD GP AB SCN SERPL QL: NEGATIVE
BLD GP AB SCN SERPL QL: POSITIVE
HAPTOGLOB SERPL-MCNC: 45 MG/DL (ref 32–363)
RH BLD: POSITIVE
SPECIMEN EXPIRATION DATE: NORMAL

## 2020-12-21 PROCEDURE — 99232 SBSQ HOSP IP/OBS MODERATE 35: CPT | Performed by: STUDENT IN AN ORGANIZED HEALTH CARE EDUCATION/TRAINING PROGRAM

## 2020-12-21 PROCEDURE — NC001 PR NO CHARGE: Performed by: INTERNAL MEDICINE

## 2020-12-21 RX ADMIN — OXYCODONE HYDROCHLORIDE AND ACETAMINOPHEN 1000 MG: 500 TABLET ORAL at 08:01

## 2020-12-21 RX ADMIN — BENZONATATE 100 MG: 100 CAPSULE ORAL at 21:06

## 2020-12-21 RX ADMIN — ZINC SULFATE 220 MG (50 MG) CAPSULE 220 MG: CAPSULE at 08:01

## 2020-12-21 RX ADMIN — REMDESIVIR 100 MG: 100 INJECTION, POWDER, LYOPHILIZED, FOR SOLUTION INTRAVENOUS at 21:07

## 2020-12-21 RX ADMIN — DABIGATRAN ETEXILATE MESYLATE 150 MG: 150 CAPSULE ORAL at 10:02

## 2020-12-21 RX ADMIN — DABIGATRAN ETEXILATE MESYLATE 150 MG: 150 CAPSULE ORAL at 17:08

## 2020-12-21 RX ADMIN — OXYCODONE HYDROCHLORIDE AND ACETAMINOPHEN 1000 MG: 500 TABLET ORAL at 21:06

## 2020-12-21 RX ADMIN — METOPROLOL SUCCINATE 12.5 MG: 25 TABLET, EXTENDED RELEASE ORAL at 08:00

## 2020-12-21 RX ADMIN — BENZONATATE 100 MG: 100 CAPSULE ORAL at 17:08

## 2020-12-21 RX ADMIN — METHYLPREDNISOLONE SODIUM SUCCINATE 40 MG: 40 INJECTION, POWDER, FOR SOLUTION INTRAMUSCULAR; INTRAVENOUS at 08:00

## 2020-12-21 RX ADMIN — BENZONATATE 100 MG: 100 CAPSULE ORAL at 08:01

## 2020-12-21 RX ADMIN — Medication 2000 UNITS: at 08:01

## 2020-12-21 RX ADMIN — PANTOPRAZOLE SODIUM 40 MG: 40 TABLET, DELAYED RELEASE ORAL at 06:14

## 2020-12-21 RX ADMIN — POTASSIUM CHLORIDE 20 MEQ: 1500 TABLET, EXTENDED RELEASE ORAL at 08:01

## 2020-12-22 ENCOUNTER — ANESTHESIA (OUTPATIENT)
Dept: PERIOP | Facility: HOSPITAL | Age: 65
DRG: 470 | End: 2020-12-22
Payer: MEDICARE

## 2020-12-22 VITALS
BODY MASS INDEX: 28.49 KG/M2 | HEIGHT: 68 IN | SYSTOLIC BLOOD PRESSURE: 150 MMHG | OXYGEN SATURATION: 93 % | DIASTOLIC BLOOD PRESSURE: 87 MMHG | WEIGHT: 188 LBS | RESPIRATION RATE: 16 BRPM | TEMPERATURE: 98 F | HEART RATE: 85 BPM

## 2020-12-22 LAB
ALBUMIN SERPL BCP-MCNC: 3 G/DL (ref 3.5–5)
ALP SERPL-CCNC: 61 U/L (ref 46–116)
ALT SERPL W P-5'-P-CCNC: 29 U/L (ref 12–78)
ANION GAP SERPL CALCULATED.3IONS-SCNC: 9 MMOL/L (ref 4–13)
AST SERPL W P-5'-P-CCNC: 43 U/L (ref 5–45)
BASOPHILS # BLD MANUAL: 0 THOUSAND/UL (ref 0–0.1)
BASOPHILS NFR MAR MANUAL: 0 % (ref 0–1)
BILIRUB SERPL-MCNC: 1.44 MG/DL (ref 0.2–1)
BLOOD GROUP ANTIBODIES SERPL: NORMAL
BUN SERPL-MCNC: 25 MG/DL (ref 5–25)
CALCIUM ALBUM COR SERPL-MCNC: 9.5 MG/DL (ref 8.3–10.1)
CALCIUM SERPL-MCNC: 8.7 MG/DL (ref 8.3–10.1)
CHLORIDE SERPL-SCNC: 108 MMOL/L (ref 100–108)
CO2 SERPL-SCNC: 26 MMOL/L (ref 21–32)
CREAT SERPL-MCNC: 0.97 MG/DL (ref 0.6–1.3)
CRP SERPL QL: 37.6 MG/L
D DIMER PPP FEU-MCNC: 0.73 UG/ML FEU
EOSINOPHIL # BLD MANUAL: 0 THOUSAND/UL (ref 0–0.4)
EOSINOPHIL NFR BLD MANUAL: 0 % (ref 0–6)
ERYTHROCYTE [DISTWIDTH] IN BLOOD BY AUTOMATED COUNT: 14.9 % (ref 11.6–15.1)
FERRITIN SERPL-MCNC: 1914 NG/ML (ref 8–388)
GFR SERPL CREATININE-BSD FRML MDRD: 81 ML/MIN/1.73SQ M
GLUCOSE SERPL-MCNC: 80 MG/DL (ref 65–140)
HCT VFR BLD AUTO: 24.7 % (ref 36.5–49.3)
HGB BLD-MCNC: 8.8 G/DL (ref 12–17)
LDH SERPL-CCNC: 754 U/L (ref 81–234)
LYMPHOCYTES # BLD AUTO: 1.18 THOUSAND/UL (ref 0.6–4.47)
LYMPHOCYTES # BLD AUTO: 7 % (ref 14–44)
MACROCYTES BLD QL AUTO: PRESENT
MAGNESIUM SERPL-MCNC: 1.6 MG/DL (ref 1.6–2.6)
MCH RBC QN AUTO: 42.3 PG (ref 26.8–34.3)
MCHC RBC AUTO-ENTMCNC: 35.6 G/DL (ref 31.4–37.4)
MCV RBC AUTO: 119 FL (ref 82–98)
MONOCYTES # BLD AUTO: 2.35 THOUSAND/UL (ref 0–1.22)
MONOCYTES NFR BLD: 14 % (ref 4–12)
NEUTROPHILS # BLD MANUAL: 12.94 THOUSAND/UL (ref 1.85–7.62)
NEUTS BAND NFR BLD MANUAL: 1 % (ref 0–8)
NEUTS SEG NFR BLD AUTO: 76 % (ref 43–75)
NRBC BLD AUTO-RTO: 12 /100 WBCS
NRBC BLD AUTO-RTO: 8 /100 WBC (ref 0–2)
OVALOCYTES BLD QL SMEAR: PRESENT
PHOSPHATE SERPL-MCNC: 2.7 MG/DL (ref 2.3–4.1)
PLATELET # BLD AUTO: 623 THOUSANDS/UL (ref 149–390)
PLATELET BLD QL SMEAR: ABNORMAL
PMV BLD AUTO: 9.9 FL (ref 8.9–12.7)
POLYCHROMASIA BLD QL SMEAR: PRESENT
POTASSIUM SERPL-SCNC: 3.4 MMOL/L (ref 3.5–5.3)
PROT SERPL-MCNC: 5.9 G/DL (ref 6.4–8.2)
RBC # BLD AUTO: 2.08 MILLION/UL (ref 3.88–5.62)
SODIUM SERPL-SCNC: 143 MMOL/L (ref 136–145)
TOTAL CELLS COUNTED SPEC: 100
VARIANT LYMPHS # BLD AUTO: 2 %
WBC # BLD AUTO: 16.81 THOUSAND/UL (ref 4.31–10.16)

## 2020-12-22 PROCEDURE — 99239 HOSP IP/OBS DSCHRG MGMT >30: CPT | Performed by: INTERNAL MEDICINE

## 2020-12-22 PROCEDURE — 83615 LACTATE (LD) (LDH) ENZYME: CPT | Performed by: STUDENT IN AN ORGANIZED HEALTH CARE EDUCATION/TRAINING PROGRAM

## 2020-12-22 PROCEDURE — 86140 C-REACTIVE PROTEIN: CPT | Performed by: STUDENT IN AN ORGANIZED HEALTH CARE EDUCATION/TRAINING PROGRAM

## 2020-12-22 PROCEDURE — 82728 ASSAY OF FERRITIN: CPT | Performed by: STUDENT IN AN ORGANIZED HEALTH CARE EDUCATION/TRAINING PROGRAM

## 2020-12-22 PROCEDURE — 84100 ASSAY OF PHOSPHORUS: CPT | Performed by: STUDENT IN AN ORGANIZED HEALTH CARE EDUCATION/TRAINING PROGRAM

## 2020-12-22 PROCEDURE — 85027 COMPLETE CBC AUTOMATED: CPT | Performed by: INTERNAL MEDICINE

## 2020-12-22 PROCEDURE — 83735 ASSAY OF MAGNESIUM: CPT | Performed by: STUDENT IN AN ORGANIZED HEALTH CARE EDUCATION/TRAINING PROGRAM

## 2020-12-22 PROCEDURE — 85007 BL SMEAR W/DIFF WBC COUNT: CPT | Performed by: INTERNAL MEDICINE

## 2020-12-22 PROCEDURE — 85379 FIBRIN DEGRADATION QUANT: CPT | Performed by: STUDENT IN AN ORGANIZED HEALTH CARE EDUCATION/TRAINING PROGRAM

## 2020-12-22 PROCEDURE — 80053 COMPREHEN METABOLIC PANEL: CPT | Performed by: STUDENT IN AN ORGANIZED HEALTH CARE EDUCATION/TRAINING PROGRAM

## 2020-12-22 RX ORDER — ZINC SULFATE 50(220)MG
220 CAPSULE ORAL DAILY
Qty: 2 CAPSULE | Refills: 0 | Status: SHIPPED | OUTPATIENT
Start: 2020-12-23 | End: 2021-06-03

## 2020-12-22 RX ORDER — BENZONATATE 100 MG/1
100 CAPSULE ORAL 3 TIMES DAILY
Qty: 20 CAPSULE | Refills: 0 | Status: SHIPPED | OUTPATIENT
Start: 2020-12-22 | End: 2021-01-26

## 2020-12-22 RX ORDER — PREDNISONE 2.5 MG
12.5 TABLET ORAL DAILY
Refills: 0
Start: 2020-12-22 | End: 2021-02-04 | Stop reason: SDUPTHER

## 2020-12-22 RX ORDER — PREDNISONE 10 MG/1
TABLET ORAL
Qty: 12 TABLET | Refills: 0 | Status: SHIPPED | OUTPATIENT
Start: 2020-12-23 | End: 2020-12-27

## 2020-12-22 RX ADMIN — METHYLPREDNISOLONE SODIUM SUCCINATE 40 MG: 40 INJECTION, POWDER, FOR SOLUTION INTRAMUSCULAR; INTRAVENOUS at 09:03

## 2020-12-22 RX ADMIN — DABIGATRAN ETEXILATE MESYLATE 150 MG: 150 CAPSULE ORAL at 09:04

## 2020-12-22 RX ADMIN — BENZONATATE 100 MG: 100 CAPSULE ORAL at 09:04

## 2020-12-22 RX ADMIN — Medication 2000 UNITS: at 09:03

## 2020-12-22 RX ADMIN — OXYCODONE HYDROCHLORIDE AND ACETAMINOPHEN 1000 MG: 500 TABLET ORAL at 09:04

## 2020-12-22 RX ADMIN — ZINC SULFATE 220 MG (50 MG) CAPSULE 220 MG: CAPSULE at 09:04

## 2020-12-22 RX ADMIN — BENZONATATE 100 MG: 100 CAPSULE ORAL at 15:00

## 2020-12-22 RX ADMIN — METOPROLOL SUCCINATE 12.5 MG: 25 TABLET, EXTENDED RELEASE ORAL at 09:04

## 2020-12-22 RX ADMIN — POTASSIUM CHLORIDE 20 MEQ: 1500 TABLET, EXTENDED RELEASE ORAL at 09:03

## 2020-12-22 RX ADMIN — REMDESIVIR 100 MG: 100 INJECTION, POWDER, LYOPHILIZED, FOR SOLUTION INTRAVENOUS at 14:57

## 2020-12-22 RX ADMIN — PANTOPRAZOLE SODIUM 40 MG: 40 TABLET, DELAYED RELEASE ORAL at 05:34

## 2020-12-23 ENCOUNTER — TRANSITIONAL CARE MANAGEMENT (OUTPATIENT)
Dept: FAMILY MEDICINE CLINIC | Facility: CLINIC | Age: 65
End: 2020-12-23

## 2020-12-23 LAB
BACTERIA BLD CULT: NORMAL
BACTERIA BLD CULT: NORMAL

## 2020-12-29 ENCOUNTER — TELEMEDICINE (OUTPATIENT)
Dept: FAMILY MEDICINE CLINIC | Facility: CLINIC | Age: 65
End: 2020-12-29
Payer: MEDICARE

## 2020-12-29 DIAGNOSIS — R06.02 SHORTNESS OF BREATH: ICD-10-CM

## 2020-12-29 DIAGNOSIS — U07.1 COVID-19 VIRUS INFECTION: Primary | ICD-10-CM

## 2020-12-29 DIAGNOSIS — E87.6 HYPOKALEMIA: ICD-10-CM

## 2020-12-29 DIAGNOSIS — D59.11 HEMOLYTIC ANEMIA DUE TO WARM ANTIBODY (HCC): Primary | ICD-10-CM

## 2020-12-29 DIAGNOSIS — N17.9 AKI (ACUTE KIDNEY INJURY) (HCC): ICD-10-CM

## 2020-12-29 DIAGNOSIS — D59.10 AUTOIMMUNE HEMOLYTIC ANEMIA (HCC): ICD-10-CM

## 2020-12-29 PROCEDURE — 99496 TRANSJ CARE MGMT HIGH F2F 7D: CPT | Performed by: FAMILY MEDICINE

## 2020-12-29 RX ORDER — POTASSIUM CHLORIDE 20 MEQ/1
20 TABLET, EXTENDED RELEASE ORAL DAILY
Qty: 30 TABLET | Refills: 3 | Status: SHIPPED | OUTPATIENT
Start: 2020-12-29

## 2021-01-04 DIAGNOSIS — D59.10 AUTOIMMUNE HEMOLYTIC ANEMIA (HCC): ICD-10-CM

## 2021-01-04 DIAGNOSIS — D59.11 HEMOLYTIC ANEMIA DUE TO WARM ANTIBODY (HCC): Primary | ICD-10-CM

## 2021-01-04 DIAGNOSIS — I10 ESSENTIAL HYPERTENSION: ICD-10-CM

## 2021-01-04 RX ORDER — METOPROLOL SUCCINATE 25 MG/1
12.5 TABLET, EXTENDED RELEASE ORAL DAILY
Qty: 30 TABLET | Refills: 5 | Status: SHIPPED | OUTPATIENT
Start: 2021-01-04

## 2021-01-04 RX ORDER — PREDNISONE 2.5 MG
2.5 TABLET ORAL DAILY
Qty: 30 TABLET | Refills: 0 | Status: SHIPPED | OUTPATIENT
Start: 2021-01-04 | End: 2021-01-25

## 2021-01-04 RX ORDER — PREDNISONE 10 MG/1
10 TABLET ORAL DAILY
Qty: 30 TABLET | Refills: 0 | Status: SHIPPED | OUTPATIENT
Start: 2021-01-04 | End: 2021-02-01

## 2021-01-08 ENCOUNTER — TRANSCRIBE ORDERS (OUTPATIENT)
Dept: ADMINISTRATIVE | Facility: HOSPITAL | Age: 66
End: 2021-01-08

## 2021-01-08 ENCOUNTER — LAB (OUTPATIENT)
Dept: LAB | Facility: MEDICAL CENTER | Age: 66
DRG: 470 | End: 2021-01-08
Payer: MEDICARE

## 2021-01-08 DIAGNOSIS — Z01.818 OTHER SPECIFIED PRE-OPERATIVE EXAMINATION: ICD-10-CM

## 2021-01-08 DIAGNOSIS — Z01.818 OTHER SPECIFIED PRE-OPERATIVE EXAMINATION: Primary | ICD-10-CM

## 2021-01-08 LAB
ALBUMIN SERPL BCP-MCNC: 4 G/DL (ref 3.5–5)
ALP SERPL-CCNC: 80 U/L (ref 46–116)
ALT SERPL W P-5'-P-CCNC: 38 U/L (ref 12–78)
ANION GAP SERPL CALCULATED.3IONS-SCNC: 4 MMOL/L (ref 4–13)
APTT PPP: 28 SECONDS (ref 23–37)
AST SERPL W P-5'-P-CCNC: 29 U/L (ref 5–45)
BACTERIA UR QL AUTO: ABNORMAL /HPF
BASOPHILS # BLD AUTO: 0.07 THOUSANDS/ΜL (ref 0–0.1)
BASOPHILS NFR BLD AUTO: 1 % (ref 0–1)
BILIRUB SERPL-MCNC: 1.18 MG/DL (ref 0.2–1)
BILIRUB UR QL STRIP: ABNORMAL
BUN SERPL-MCNC: 17 MG/DL (ref 5–25)
CALCIUM SERPL-MCNC: 9.7 MG/DL (ref 8.3–10.1)
CHLORIDE SERPL-SCNC: 104 MMOL/L (ref 100–108)
CLARITY UR: CLEAR
CO2 SERPL-SCNC: 30 MMOL/L (ref 21–32)
COLOR UR: ABNORMAL
CREAT SERPL-MCNC: 1.32 MG/DL (ref 0.6–1.3)
CRP SERPL QL: 4.2 MG/L
EOSINOPHIL # BLD AUTO: 0.29 THOUSAND/ΜL (ref 0–0.61)
EOSINOPHIL NFR BLD AUTO: 4 % (ref 0–6)
ERYTHROCYTE [DISTWIDTH] IN BLOOD BY AUTOMATED COUNT: 16.2 % (ref 11.6–15.1)
EST. AVERAGE GLUCOSE BLD GHB EST-MCNC: 80 MG/DL
FERRITIN SERPL-MCNC: 242 NG/ML (ref 8–388)
GFR SERPL CREATININE-BSD FRML MDRD: 56 ML/MIN/1.73SQ M
GLUCOSE P FAST SERPL-MCNC: 87 MG/DL (ref 65–99)
GLUCOSE UR STRIP-MCNC: NEGATIVE MG/DL
HBA1C MFR BLD: 4.4 %
HCT VFR BLD AUTO: 40.7 % (ref 36.5–49.3)
HGB BLD-MCNC: 13.9 G/DL (ref 12–17)
HGB UR QL STRIP.AUTO: ABNORMAL
HYALINE CASTS #/AREA URNS LPF: ABNORMAL /LPF
IMM GRANULOCYTES # BLD AUTO: 0.03 THOUSAND/UL (ref 0–0.2)
IMM GRANULOCYTES NFR BLD AUTO: 0 % (ref 0–2)
INR PPP: 1.03 (ref 0.84–1.19)
IRON SATN MFR SERPL: 27 %
IRON SERPL-MCNC: 120 UG/DL (ref 65–175)
KETONES UR STRIP-MCNC: NEGATIVE MG/DL
LEUKOCYTE ESTERASE UR QL STRIP: NEGATIVE
LYMPHOCYTES # BLD AUTO: 2.49 THOUSANDS/ΜL (ref 0.6–4.47)
LYMPHOCYTES NFR BLD AUTO: 30 % (ref 14–44)
MCH RBC QN AUTO: 39.4 PG (ref 26.8–34.3)
MCHC RBC AUTO-ENTMCNC: 34.2 G/DL (ref 31.4–37.4)
MCV RBC AUTO: 115 FL (ref 82–98)
MONOCYTES # BLD AUTO: 0.87 THOUSAND/ΜL (ref 0.17–1.22)
MONOCYTES NFR BLD AUTO: 11 % (ref 4–12)
NEUTROPHILS # BLD AUTO: 4.5 THOUSANDS/ΜL (ref 1.85–7.62)
NEUTS SEG NFR BLD AUTO: 54 % (ref 43–75)
NITRITE UR QL STRIP: NEGATIVE
NON-SQ EPI CELLS URNS QL MICRO: ABNORMAL /HPF
NRBC BLD AUTO-RTO: 0 /100 WBCS
PH UR STRIP.AUTO: 6 [PH]
PLATELET # BLD AUTO: 413 THOUSANDS/UL (ref 149–390)
PMV BLD AUTO: 10.3 FL (ref 8.9–12.7)
POTASSIUM SERPL-SCNC: 3.8 MMOL/L (ref 3.5–5.3)
PROT SERPL-MCNC: 7.2 G/DL (ref 6.4–8.2)
PROT UR STRIP-MCNC: ABNORMAL MG/DL
PROTHROMBIN TIME: 13.5 SECONDS (ref 11.6–14.5)
RBC # BLD AUTO: 3.53 MILLION/UL (ref 3.88–5.62)
RBC #/AREA URNS AUTO: ABNORMAL /HPF
SODIUM SERPL-SCNC: 138 MMOL/L (ref 136–145)
SP GR UR STRIP.AUTO: 1.02 (ref 1–1.03)
TIBC SERPL-MCNC: 447 UG/DL (ref 250–450)
UROBILINOGEN UR QL STRIP.AUTO: 0.2 E.U./DL
WBC # BLD AUTO: 8.25 THOUSAND/UL (ref 4.31–10.16)
WBC #/AREA URNS AUTO: ABNORMAL /HPF

## 2021-01-08 PROCEDURE — 36415 COLL VENOUS BLD VENIPUNCTURE: CPT

## 2021-01-08 PROCEDURE — 83540 ASSAY OF IRON: CPT

## 2021-01-08 PROCEDURE — 80053 COMPREHEN METABOLIC PANEL: CPT

## 2021-01-08 PROCEDURE — 85730 THROMBOPLASTIN TIME PARTIAL: CPT

## 2021-01-08 PROCEDURE — 86140 C-REACTIVE PROTEIN: CPT

## 2021-01-08 PROCEDURE — 83036 HEMOGLOBIN GLYCOSYLATED A1C: CPT

## 2021-01-08 PROCEDURE — 82728 ASSAY OF FERRITIN: CPT

## 2021-01-08 PROCEDURE — 85025 COMPLETE CBC W/AUTO DIFF WBC: CPT

## 2021-01-08 PROCEDURE — 83550 IRON BINDING TEST: CPT

## 2021-01-08 PROCEDURE — 81001 URINALYSIS AUTO W/SCOPE: CPT | Performed by: ORTHOPAEDIC SURGERY

## 2021-01-08 PROCEDURE — 85610 PROTHROMBIN TIME: CPT

## 2021-01-11 ENCOUNTER — TELEMEDICINE (OUTPATIENT)
Dept: FAMILY MEDICINE CLINIC | Facility: CLINIC | Age: 66
End: 2021-01-11
Payer: MEDICARE

## 2021-01-11 DIAGNOSIS — U07.1 COVID-19: Primary | ICD-10-CM

## 2021-01-11 PROCEDURE — 99213 OFFICE O/P EST LOW 20 MIN: CPT | Performed by: FAMILY MEDICINE

## 2021-01-11 NOTE — PROGRESS NOTES
Virtual Regular Visit      Assessment/Plan:  Patient has completed 20 days of home isolation  Recommend patient scheduled follow-up appointment in 1-2 weeks for preoperative medical clearance exam   Problem List Items Addressed This Visit        Other    COVID-19 - Primary               Reason for visit is No chief complaint on file  Encounter provider Jethro Lyon DO    Provider located at [address] [facility] [address] [id number]      Recent Visits  No visits were found meeting these conditions  Showing recent visits within past 7 days and meeting all other requirements     Future Appointments  No visits were found meeting these conditions  Showing future appointments within next 150 days and meeting all other requirements        The patient was identified by name and date of birth  Cosmo Conroy was informed that this is a telemedicine visit and that the visit is being conducted through [employer] and patient was informed that this is not a secure, HIPAA-compliant platform  He agrees to proceed     My office door was closed  No one else was in the room  He acknowledged consent and understanding of privacy and security of the video platform  The patient has agreed to participate and understands they can discontinue the visit at any time  Patient is aware this is a billable service  Subjective  Cosmo Conroy is a 76 y o  male is being seen in follow-up for COVID-19 infection  Patient was admitted to [facility]  from 12/18/2020 until 12/22/2020 and he did not require oxygen  He tested positive on 12/15/2020  Patient states he is doing excellent and has not felt this well in a long time  His prednisone has been weaned down to his maintenance dose of 12 5 mg daily  Patient states that his sense of taste and smell have returned  He denies fever chills other admits to occasional sweats    Patient denies cough although admits to slight shortness of breath which has improved overall  He denies rash or red eyes  He denies nausea, vomiting or diarrhea  Patient has a telemedicine appointment with Dr Kathleen Rivera Hematology/Oncology tomorrow on 1/12/21  Patient is tentatively rescheduled for right total knee replacement surgery on 02/02/2021 with Dr José Miguel Del Cid, orthopedic surgeon at [facility]     Past Medical History:   Diagnosis Date    Anxiety     Arthritis     Autoimmune hemolytic anemia     Claustrophobia     DVT (deep venous thrombosis) (HCC)     GERD (gastroesophageal reflux disease)     Hearing loss, right     Hemolytic anemia (HCC)     History of transfusion     2018 - no adverse reaction    Hypertension     Palpitation     Portal vein thrombosis     PTSD (post-traumatic stress disorder)     Pulmonary emboli (HCC)     Tobacco abuse        Past Surgical History:   Procedure Laterality Date    COLONOSCOPY      ELBOW ARTHROPLASTY Left     bursectomy    KNEE SURGERY Right     meniscus tear    RI LAP,CHOLECYSTECTOMY/GRAPH N/A 12/23/2017    Procedure: CHOLECYSTECTOMY LAPAROSCOPIC with cholangiogram;  Surgeon: Kavita Slater MD;  Location: [facility];  Service: General    RI REMOVAL SPLEEN, TOTAL N/A 5/18/2017    Procedure: LAPAROSCOPIC HAND ASSIST SPLENECTOMY;  Surgeon: Grace Briggs MD;  [facility];  Service: Surgical Oncology    SHOULDER SURGERY Left     rotator cuff x4, reconstruction       Current Outpatient Medications   Medication Sig Dispense Refill    ascorbic acid (VITAMIN C) 1000 MG tablet Take 1 tablet (1,000 mg total) by mouth every 12 (twelve) hours for 6 doses 6 tablet 0    benzonatate (TESSALON PERLES) 100 mg capsule Take 1 capsule (100 mg total) by mouth 3 (three) times a day 20 capsule 0    dabigatran etexilate (Pradaxa) 150 mg capsu Take 1 capsule (150 mg total) by mouth 2 (two) times a day 60 capsule 4    Deferasirox (Jadenu) 360 MG TABS Take three 360mg tablets daily  (total dose 1080mg) (Patient taking differently: Take 1 tablet by mouth daily Take three 360mg tablets daily  (total dose 1080mg)) 90 tablet 3    hydrochlorothiazide (HYDRODIURIL) 25 mg tablet TAKE ONE TABLET BY MOUTH DAILY  30 tablet 5    metoprolol succinate (TOPROL-XL) 25 mg 24 hr tablet Take 0 5 tablets (12 5 mg total) by mouth daily 30 tablet 5    NON FORMULARY Medicinal Marijuana prn      pantoprazole (PROTONIX) 40 mg tablet Take 1 tablet (40 mg total) by mouth daily 90 tablet 3    potassium chloride (K-DUR,KLOR-CON) 20 mEq tablet Take 1 tablet (20 mEq total) by mouth daily 30 tablet 3    predniSONE 10 mg tablet Take 1 tablet (10 mg total) by mouth daily 30 tablet 0    predniSONE 2 5 mg tablet Take 5 tablets (12 5 mg total) by mouth daily Resume taking on 12/27/2020  0    predniSONE 2 5 mg tablet Take 1 tablet (2 5 mg total) by mouth daily 30 tablet 0    VITAMIN D PO Take 1 capsule by mouth daily      VITAMIN E PO Take 1 capsule by mouth daily      zinc sulfate (ZINCATE) 220 mg capsule Take 1 capsule (220 mg total) by mouth daily for 2 doses 2 capsule 0     No current facility-administered medications for this visit  Allergies   Allergen Reactions    Iodinated Diagnostic Agents Hives     Unsure if this is still an allergy       Review of Systems    Video Exam    There were no vitals filed for this visit  Physical Exam  Constitutional:       General: He is not in acute distress  Appearance: Normal appearance  He is not ill-appearing, toxic-appearing or diaphoretic  HENT:      Head: Normocephalic  Mouth/Throat:      Mouth: Mucous membranes are moist    Eyes:      General: No scleral icterus  Conjunctiva/sclera: Conjunctivae normal    Pulmonary:      Effort: Pulmonary effort is normal       Comments: Patient is talking in complete sentences without shortness of breath or cough  Neurological:      Mental Status: He is alert and oriented to person, place, and time  Psychiatric:         Mood and Affect: Mood normal          Behavior: Behavior normal          Thought Content: Thought content normal          Judgment: Judgment normal           I spent 15 minutes directly with the patient during this visit      [facility] acknowledges that he has consented to an online visit or consultation  He understands that the online visit is based solely on information provided by him, and that, in the absence of a face-to-face physical evaluation by the physician, the diagnosis he receives is both limited and provisional in terms of accuracy and completeness  This is not intended to replace a full medical face-to-face evaluation by the physician  Luis Carlos Kimball understands and accepts these terms

## 2021-01-12 ENCOUNTER — TELEMEDICINE (OUTPATIENT)
Dept: HEMATOLOGY ONCOLOGY | Facility: CLINIC | Age: 66
End: 2021-01-12
Payer: MEDICARE

## 2021-01-12 DIAGNOSIS — R06.02 SHORTNESS OF BREATH: ICD-10-CM

## 2021-01-12 DIAGNOSIS — D75.839 THROMBOCYTOSIS: ICD-10-CM

## 2021-01-12 DIAGNOSIS — U07.1 COVID-19: ICD-10-CM

## 2021-01-12 DIAGNOSIS — E83.111 HEMOCHROMATOSIS AFTER MULTIPLE RED BLOOD CELL TRANSFUSIONS: ICD-10-CM

## 2021-01-12 DIAGNOSIS — D59.11 HEMOLYTIC ANEMIA DUE TO WARM ANTIBODY (HCC): Primary | ICD-10-CM

## 2021-01-12 PROCEDURE — 99214 OFFICE O/P EST MOD 30 MIN: CPT | Performed by: INTERNAL MEDICINE

## 2021-01-12 NOTE — PROGRESS NOTES
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  He was hospitalized for COVID infection  Ferritin had increased to 25,000, inflammatory  This has subsequently normalized  Repeat CBC after discharge shows increase in hemoglobin, previously 8 6, currently 13 9  Platelets previously 600, currently for 13  Previous thrombocytosis likely inflammatory reactive  His hemolytic anemia peers to have cooled off  Continuation of low-dose prednisone as before is recommended  He will be having knee surgery on February 2nd  Eliquis suspension 48 hours prior to surgery with reinstitution the next day is recommended  I reviewed his medications, medical conditions, laboratory studies  I reviewed the above with the patient  He voiced understanding and agreement  Problem List Items Addressed This Visit     None               Reason for visit is   Chief Complaint   Patient presents with    Virtual Regular Visit        Encounter provider Brandie Ohara DO    Provider located at [address] [place] [id number]  [phone]      Recent Visits  Date Type Provider [facility]   [facility] [address] [facility] [facility]   Showing recent visits within past 7 days and meeting all other requirements     Future Appointments  No visits were found meeting these conditions  Showing future appointments within next 150 days and meeting all other requirements        The patient was identified by name and date of birth  Dannielle Olsen was informed that this is a telemedicine visit and that the visit is being conducted through [address] [place] [address] and patient was informed that this is not a secure, HIPAA-compliant platform  He agrees to proceed     My office door was closed  No one else was in the room  He acknowledged consent and understanding of privacy and security of the video platform  The patient has agreed to participate and understands they can discontinue the visit at any time  Patient is aware this is a billable service  Subjective  Yeimy Sheikh is a 76 y o  male with history of Tucker syndrome  He was hospitalized with COVID a few weeks ago  During hospitalization thrombocytopenia and anemia worsened  He has been feeling better since discharge 2 weeks ago  Breathing, malaise, appetite, myalgias have all improved/resolved  HPI see above      Past Medical History:   Diagnosis Date    Anxiety     Arthritis     Autoimmune hemolytic anemia     Claustrophobia     DVT (deep venous thrombosis) (HCC)     GERD (gastroesophageal reflux disease)     Hearing loss, right     Hemolytic anemia (HCC)     History of transfusion     2018 - no adverse reaction    Hypertension     Palpitation     Portal vein thrombosis     PTSD (post-traumatic stress disorder)     Pulmonary emboli (HCC)     Tobacco abuse        Past Surgical History:   Procedure Laterality Date    COLONOSCOPY      ELBOW ARTHROPLASTY Left     bursectomy    KNEE SURGERY Right     meniscus tear    AK LAP,CHOLECYSTECTOMY/GRAPH N/A 12/23/2017    Procedure: CHOLECYSTECTOMY LAPAROSCOPIC with cholangiogram;  Surgeon: Cecille Sotomayor MD;  Location: [facility];  Service: General    AK REMOVAL SPLEEN, TOTAL N/A 5/18/2017    Procedure: LAPAROSCOPIC HAND ASSIST SPLENECTOMY;  Surgeon: Sixto Wong MD;  [facility];  Service: Surgical Oncology    SHOULDER SURGERY Left     rotator cuff x4, reconstruction       Current Outpatient Medications   Medication Sig Dispense Refill    ascorbic acid (VITAMIN C) 1000 MG tablet Take 1 tablet (1,000 mg total) by mouth every 12 (twelve) hours for 6 doses 6 tablet 0    benzonatate (TESSALON PERLES) 100 mg capsule Take 1 capsule (100 mg total) by mouth 3 (three) times a day 20 capsule 0    dabigatran etexilate (Pradaxa) 150 mg capsu Take 1 capsule (150 mg total) by mouth 2 (two) times a day 60 capsule 4    Deferasirox (Jadenu) 360 MG TABS Take three 360mg tablets daily  (total dose 1080mg) (Patient taking differently: Take 1 tablet by mouth daily Take three 360mg tablets daily  (total dose 1080mg)) 90 tablet 3    hydrochlorothiazide (HYDRODIURIL) 25 mg tablet TAKE ONE TABLET BY MOUTH DAILY  30 tablet 5    metoprolol succinate (TOPROL-XL) 25 mg 24 hr tablet Take 0 5 tablets (12 5 mg total) by mouth daily 30 tablet 5    NON FORMULARY Medicinal Marijuana prn      pantoprazole (PROTONIX) 40 mg tablet Take 1 tablet (40 mg total) by mouth daily 90 tablet 3    potassium chloride (K-DUR,KLOR-CON) 20 mEq tablet Take 1 tablet (20 mEq total) by mouth daily 30 tablet 3    predniSONE 10 mg tablet Take 1 tablet (10 mg total) by mouth daily 30 tablet 0    predniSONE 2 5 mg tablet Take 5 tablets (12 5 mg total) by mouth daily Resume taking on 12/27/2020  0    predniSONE 2 5 mg tablet Take 1 tablet (2 5 mg total) by mouth daily 30 tablet 0    VITAMIN D PO Take 1 capsule by mouth daily      VITAMIN E PO Take 1 capsule by mouth daily      zinc sulfate (ZINCATE) 220 mg capsule Take 1 capsule (220 mg total) by mouth daily for 2 doses 2 capsule 0     No current facility-administered medications for this visit  Allergies   Allergen Reactions    Iodinated Diagnostic Agents Hives     Unsure if this is still an allergy       Review of Systems    Video Exam    There were no vitals filed for this visit  Physical Exam     I spent 25 minutes directly with the patient during this visit      [address] [place] [address] acknowledges that he has consented to an online visit or consultation  He understands that the online visit is based solely on information provided by him, and that, in the absence of a face-to-face physical evaluation by the physician, the diagnosis he receives is both limited and provisional in terms of accuracy and completeness   This is not intended to replace a full medical face-to-face evaluation by the physician  Dannielle Olsen understands and accepts these terms

## 2021-01-22 ENCOUNTER — LAB (OUTPATIENT)
Dept: LAB | Facility: MEDICAL CENTER | Age: 66
DRG: 470 | End: 2021-01-22
Payer: MEDICARE

## 2021-01-22 DIAGNOSIS — D59.10 AUTOIMMUNE HEMOLYTIC ANEMIA (HCC): ICD-10-CM

## 2021-01-22 LAB
BASOPHILS # BLD AUTO: 0.11 THOUSANDS/ΜL (ref 0–0.1)
BASOPHILS NFR BLD AUTO: 1 % (ref 0–1)
EOSINOPHIL # BLD AUTO: 0.3 THOUSAND/ΜL (ref 0–0.61)
EOSINOPHIL NFR BLD AUTO: 3 % (ref 0–6)
ERYTHROCYTE [DISTWIDTH] IN BLOOD BY AUTOMATED COUNT: 15.7 % (ref 11.6–15.1)
HCT VFR BLD AUTO: 39.4 % (ref 36.5–49.3)
HGB BLD-MCNC: 14.1 G/DL (ref 12–17)
IMM GRANULOCYTES # BLD AUTO: 0.05 THOUSAND/UL (ref 0–0.2)
IMM GRANULOCYTES NFR BLD AUTO: 1 % (ref 0–2)
LYMPHOCYTES # BLD AUTO: 2.83 THOUSANDS/ΜL (ref 0.6–4.47)
LYMPHOCYTES NFR BLD AUTO: 28 % (ref 14–44)
MCH RBC QN AUTO: 41 PG (ref 26.8–34.3)
MCHC RBC AUTO-ENTMCNC: 35.8 G/DL (ref 31.4–37.4)
MCV RBC AUTO: 115 FL (ref 82–98)
MONOCYTES # BLD AUTO: 0.97 THOUSAND/ΜL (ref 0.17–1.22)
MONOCYTES NFR BLD AUTO: 10 % (ref 4–12)
NEUTROPHILS # BLD AUTO: 5.76 THOUSANDS/ΜL (ref 1.85–7.62)
NEUTS SEG NFR BLD AUTO: 57 % (ref 43–75)
NRBC BLD AUTO-RTO: 0 /100 WBCS
PLATELET # BLD AUTO: 511 THOUSANDS/UL (ref 149–390)
PMV BLD AUTO: 10.6 FL (ref 8.9–12.7)
RBC # BLD AUTO: 3.44 MILLION/UL (ref 3.88–5.62)
WBC # BLD AUTO: 10.02 THOUSAND/UL (ref 4.31–10.16)

## 2021-01-22 PROCEDURE — 36415 COLL VENOUS BLD VENIPUNCTURE: CPT

## 2021-01-22 PROCEDURE — 85025 COMPLETE CBC W/AUTO DIFF WBC: CPT

## 2021-01-25 ENCOUNTER — CONSULT (OUTPATIENT)
Dept: FAMILY MEDICINE CLINIC | Facility: CLINIC | Age: 66
End: 2021-01-25

## 2021-01-25 VITALS
HEIGHT: 68 IN | BODY MASS INDEX: 28.76 KG/M2 | WEIGHT: 189.8 LBS | RESPIRATION RATE: 18 BRPM | DIASTOLIC BLOOD PRESSURE: 88 MMHG | SYSTOLIC BLOOD PRESSURE: 140 MMHG | HEART RATE: 84 BPM | TEMPERATURE: 96.6 F | OXYGEN SATURATION: 97 %

## 2021-01-25 DIAGNOSIS — D59.10 AUTOIMMUNE HEMOLYTIC ANEMIA (HCC): ICD-10-CM

## 2021-01-25 DIAGNOSIS — K21.9 GASTROESOPHAGEAL REFLUX DISEASE WITHOUT ESOPHAGITIS: ICD-10-CM

## 2021-01-25 DIAGNOSIS — Z01.818 PREOPERATIVE CLEARANCE: Primary | ICD-10-CM

## 2021-01-25 DIAGNOSIS — I10 ESSENTIAL HYPERTENSION: ICD-10-CM

## 2021-01-25 DIAGNOSIS — M17.0 PRIMARY OSTEOARTHRITIS OF BOTH KNEES: ICD-10-CM

## 2021-01-25 PROCEDURE — 99214 OFFICE O/P EST MOD 30 MIN: CPT | Performed by: FAMILY MEDICINE

## 2021-01-25 RX ORDER — CHLORHEXIDINE GLUCONATE 4 G/100ML
SOLUTION TOPICAL
COMMUNITY
Start: 2020-09-03 | End: 2021-02-04 | Stop reason: HOSPADM

## 2021-01-25 RX ORDER — PRAZOSIN HYDROCHLORIDE 1 MG/1
1 CAPSULE ORAL
COMMUNITY
Start: 2020-12-08

## 2021-01-25 RX ORDER — SERTRALINE HYDROCHLORIDE 100 MG/1
TABLET, FILM COATED ORAL
COMMUNITY
Start: 2020-12-08 | End: 2021-01-26

## 2021-01-25 NOTE — PROGRESS NOTES
Assessment/Plan:  Patient is medically cleared for proposed surgery  PA T labs and EKG reviewed  Patient to continue present treatment  Return the office as scheduled  Diagnoses and all orders for this visit:    Preoperative clearance    Primary osteoarthritis of both knees    Essential hypertension    Gastroesophageal reflux disease without esophagitis    Autoimmune hemolytic anemia    Other orders  -     sertraline (ZOLOFT) 100 mg tablet; TAKE ONE-HALF TABLET BY MOUTH EVERY MORNING FOR MOOD  -     prazosin (MINIPRESS) 1 mg capsule  -     chlorhexidine (Hibiclens) 4 % external liquid; wash surgical site for 5 days prior to surgery          Subjective:      Patient ID: Bianka Samaniego is a 76 y o  male  Patient is here for preoperative medical clearance exam for right total knee replacement surgery scheduled on 02/02/2021 at [facility] with Dr Sergio Harris, orthopedic surgeon at [facility]  Patient had PA T labs on 01/08/2021 and PA T EKG on 12/18/2020  Patient had preop evaluation with Dr Flo delatorre at [address] [place] [address] Hematology/Oncology on 01/12/2021 for clearance and to discuss anticoagulation medication  Patient was admitted [facility] from 12/18/2020 until 12/22/2020 for COVID-19 infection  Patient states he is feeling great overall  He has been exercising on his [employer] bike for about 10 minutes daily  The following portions of the patient's history were reviewed and updated as appropriate: allergies, current medications, past family history, past medical history, past social history, past surgical history and problem list     Review of Systems   Constitutional: Negative for activity change, appetite change, chills, diaphoresis, fatigue, fever and unexpected weight change  HENT: Negative  Eyes: Negative  Respiratory: Positive for shortness of breath  Negative for cough, chest tightness and wheezing  Cardiovascular: Positive for palpitations   Negative for chest pain and leg swelling  Gastrointestinal: Negative for abdominal pain, blood in stool, constipation, diarrhea, nausea and vomiting  Endocrine: Negative for cold intolerance and heat intolerance  Genitourinary: Negative for difficulty urinating, dysuria, frequency and hematuria  Musculoskeletal: Positive for arthralgias  Negative for back pain, gait problem, joint swelling, myalgias, neck pain and neck stiffness  Skin: Negative  Neurological: Negative for dizziness, syncope, weakness, light-headedness and headaches  Hematological: Negative for adenopathy  Does not bruise/bleed easily  Psychiatric/Behavioral: Positive for sleep disturbance  Negative for decreased concentration and dysphoric mood  The patient is nervous/anxious  Objective:      /88 (BP Location: Left arm, Patient Position: Sitting, Cuff Size: Standard)   Pulse 84   Temp (!) 96 6 °F (35 9 °C) (Temporal)   Resp 18   Ht 5' 8" (1 727 m)   Wt 86 1 kg (189 lb 12 8 oz)   SpO2 97%   BMI 28 86 kg/m²          Physical Exam  Constitutional:       General: He is not in acute distress  Appearance: Normal appearance  He is not ill-appearing, toxic-appearing or diaphoretic  HENT:      Head: Normocephalic  Mouth/Throat:      Mouth: Mucous membranes are moist    Eyes:      General: No scleral icterus  Conjunctiva/sclera: Conjunctivae normal    Neck:      Musculoskeletal: Neck supple  Vascular: No carotid bruit  Cardiovascular:      Rate and Rhythm: Normal rate and regular rhythm  Pulmonary:      Effort: Pulmonary effort is normal       Breath sounds: Normal breath sounds  Abdominal:      Palpations: Abdomen is soft  Tenderness: There is no abdominal tenderness  Musculoskeletal:      Right lower leg: No edema  Left lower leg: No edema  Lymphadenopathy:      Cervical: No cervical adenopathy  Skin:     General: Skin is warm and dry     Neurological:      Mental Status: He is alert and oriented to person, place, and time  Psychiatric:         Mood and Affect: Mood normal          Behavior: Behavior normal          Thought Content:  Thought content normal          Judgment: Judgment normal

## 2021-01-26 NOTE — PRE-PROCEDURE INSTRUCTIONS
Pre-Surgery Instructions:   Medication Instructions    ascorbic acid (VITAMIN C) 1000 MG tablet Instructed patient per Anesthesia Guidelines   dabigatran etexilate (Pradaxa) 150 mg capsu Patient was instructed by Physician and understands   hydrochlorothiazide (HYDRODIURIL) 25 mg tablet Instructed patient per Anesthesia Guidelines   metoprolol succinate (TOPROL-XL) 25 mg 24 hr tablet Instructed patient per Anesthesia Guidelines   NON FORMULARY Instructed patient per Anesthesia Guidelines   pantoprazole (PROTONIX) 40 mg tablet Instructed patient per Anesthesia Guidelines   potassium chloride (K-DUR,KLOR-CON) 20 mEq tablet Instructed patient per Anesthesia Guidelines   prazosin (MINIPRESS) 1 mg capsule Instructed patient per Anesthesia Guidelines   predniSONE 10 mg tablet Instructed patient per Anesthesia Guidelines   predniSONE 2 5 mg tablet Instructed patient per Anesthesia Guidelines   VITAMIN D PO Instructed patient per Anesthesia Guidelines   VITAMIN E PO Instructed patient per Anesthesia Guidelines   zinc sulfate (ZINCATE) 220 mg capsule Instructed patient per Anesthesia Guidelines   [DISCONTINUED] metoprolol succinate (TOPROL-XL) 25 mg 24 hr tablet     [DISCONTINUED] potassium chloride (K-DUR,KLOR-CON) 20 mEq tablet     [DISCONTINUED] PREDNISONE PO     Pt instructed to take prednisone, protonix, and metoprolol the morning of surgery with a small sip of water  [facility] preop instructions reviewed with pt  Pt has surgical soap  Incentive spirometer reviewed

## 2021-01-31 DIAGNOSIS — D59.11 HEMOLYTIC ANEMIA DUE TO WARM ANTIBODY (HCC): ICD-10-CM

## 2021-02-01 RX ORDER — PREDNISONE 10 MG/1
TABLET ORAL
Qty: 30 TABLET | Refills: 0 | Status: SHIPPED | OUTPATIENT
Start: 2021-02-01 | End: 2021-02-25 | Stop reason: SDUPTHER

## 2021-02-02 ENCOUNTER — APPOINTMENT (OUTPATIENT)
Dept: RADIOLOGY | Facility: HOSPITAL | Age: 66
DRG: 470 | End: 2021-02-02
Payer: MEDICARE

## 2021-02-02 ENCOUNTER — HOSPITAL ENCOUNTER (INPATIENT)
Facility: HOSPITAL | Age: 66
LOS: 1 days | Discharge: HOME HEALTH | DRG: 470 | End: 2021-02-04
Attending: ORTHOPAEDIC SURGERY | Admitting: ORTHOPAEDIC SURGERY
Payer: MEDICARE

## 2021-02-02 VITALS — HEART RATE: 78 BPM

## 2021-02-02 DIAGNOSIS — D59.11 HEMOLYTIC ANEMIA DUE TO WARM ANTIBODY (HCC): ICD-10-CM

## 2021-02-02 DIAGNOSIS — M17.11 PRIMARY OSTEOARTHRITIS OF RIGHT KNEE: ICD-10-CM

## 2021-02-02 DIAGNOSIS — N28.9 RENAL INSUFFICIENCY: ICD-10-CM

## 2021-02-02 DIAGNOSIS — M17.11 PRIMARY LOCALIZED OSTEOARTHROSIS OF THE KNEE, RIGHT: Primary | ICD-10-CM

## 2021-02-02 PROCEDURE — C1776 JOINT DEVICE (IMPLANTABLE): HCPCS | Performed by: ORTHOPAEDIC SURGERY

## 2021-02-02 PROCEDURE — C1713 ANCHOR/SCREW BN/BN,TIS/BN: HCPCS | Performed by: ORTHOPAEDIC SURGERY

## 2021-02-02 PROCEDURE — 73560 X-RAY EXAM OF KNEE 1 OR 2: CPT

## 2021-02-02 PROCEDURE — 97163 PT EVAL HIGH COMPLEX 45 MIN: CPT | Performed by: PHYSICAL THERAPIST

## 2021-02-02 PROCEDURE — 0SRC0J9 REPLACEMENT OF RIGHT KNEE JOINT WITH SYNTHETIC SUBSTITUTE, CEMENTED, OPEN APPROACH: ICD-10-PCS | Performed by: ORTHOPAEDIC SURGERY

## 2021-02-02 DEVICE — ATTUNE PATELLA MEDIALIZED ANATOMIC 38MM CEMENTED AOX
Type: IMPLANTABLE DEVICE | Site: KNEE | Status: FUNCTIONAL
Brand: ATTUNE

## 2021-02-02 DEVICE — ATTUNE KNEE SYSTEM FEMORAL POSTERIOR STABILIZED SIZE 8 RIGHT CEMENTED
Type: IMPLANTABLE DEVICE | Site: KNEE | Status: FUNCTIONAL
Brand: ATTUNE

## 2021-02-02 DEVICE — SMARTSET HIGH PERFORMANCE MV MEDIUM VISCOSITY BONE CEMENT 40G
Type: IMPLANTABLE DEVICE | Site: KNEE | Status: FUNCTIONAL
Brand: SMARTSET

## 2021-02-02 DEVICE — ATTUNE KNEE SYSTEM TIBIAL INSERT ROTATING PLATFORM POSTERIOR STABILIZED 8 6MM AOX
Type: IMPLANTABLE DEVICE | Site: KNEE | Status: FUNCTIONAL
Brand: ATTUNE

## 2021-02-02 DEVICE — ATTUNE KNEE SYSTEM TIBIAL BASE ROTATING PLATFORM SIZE 7 CEMENTED
Type: IMPLANTABLE DEVICE | Site: KNEE | Status: FUNCTIONAL
Brand: ATTUNE

## 2021-02-02 RX ORDER — CEFAZOLIN SODIUM 1 G/50ML
1000 SOLUTION INTRAVENOUS EVERY 8 HOURS
Status: COMPLETED | OUTPATIENT
Start: 2021-02-02 | End: 2021-02-03

## 2021-02-02 RX ORDER — PANTOPRAZOLE SODIUM 40 MG/1
40 TABLET, DELAYED RELEASE ORAL
Status: DISCONTINUED | OUTPATIENT
Start: 2021-02-03 | End: 2021-02-04 | Stop reason: HOSPADM

## 2021-02-02 RX ORDER — CEFAZOLIN SODIUM 2 G/50ML
2000 SOLUTION INTRAVENOUS ONCE
Status: COMPLETED | OUTPATIENT
Start: 2021-02-02 | End: 2021-02-02

## 2021-02-02 RX ORDER — ONDANSETRON 2 MG/ML
4 INJECTION INTRAMUSCULAR; INTRAVENOUS EVERY 8 HOURS PRN
Status: DISCONTINUED | OUTPATIENT
Start: 2021-02-02 | End: 2021-02-04 | Stop reason: HOSPADM

## 2021-02-02 RX ORDER — TRANEXAMIC ACID 100 MG/ML
INJECTION, SOLUTION INTRAVENOUS AS NEEDED
Status: DISCONTINUED | OUTPATIENT
Start: 2021-02-02 | End: 2021-02-02

## 2021-02-02 RX ORDER — CEFAZOLIN SODIUM 1 G/50ML
1000 SOLUTION INTRAVENOUS EVERY 8 HOURS
Status: DISCONTINUED | OUTPATIENT
Start: 2021-02-02 | End: 2021-02-02 | Stop reason: SDUPTHER

## 2021-02-02 RX ORDER — LABETALOL 20 MG/4 ML (5 MG/ML) INTRAVENOUS SYRINGE
AS NEEDED
Status: DISCONTINUED | OUTPATIENT
Start: 2021-02-02 | End: 2021-02-02

## 2021-02-02 RX ORDER — DEXAMETHASONE SODIUM PHOSPHATE 4 MG/ML
INJECTION, SOLUTION INTRA-ARTICULAR; INTRALESIONAL; INTRAMUSCULAR; INTRAVENOUS; SOFT TISSUE AS NEEDED
Status: DISCONTINUED | OUTPATIENT
Start: 2021-02-02 | End: 2021-02-02

## 2021-02-02 RX ORDER — ONDANSETRON 2 MG/ML
4 INJECTION INTRAMUSCULAR; INTRAVENOUS ONCE AS NEEDED
Status: DISCONTINUED | OUTPATIENT
Start: 2021-02-02 | End: 2021-02-02 | Stop reason: HOSPADM

## 2021-02-02 RX ORDER — POTASSIUM CHLORIDE 20 MEQ/1
20 TABLET, EXTENDED RELEASE ORAL DAILY
Status: DISCONTINUED | OUTPATIENT
Start: 2021-02-02 | End: 2021-02-04 | Stop reason: HOSPADM

## 2021-02-02 RX ORDER — ONDANSETRON 2 MG/ML
INJECTION INTRAMUSCULAR; INTRAVENOUS AS NEEDED
Status: DISCONTINUED | OUTPATIENT
Start: 2021-02-02 | End: 2021-02-02

## 2021-02-02 RX ORDER — SIMETHICONE 80 MG
80 TABLET,CHEWABLE ORAL 4 TIMES DAILY PRN
Status: DISCONTINUED | OUTPATIENT
Start: 2021-02-02 | End: 2021-02-04 | Stop reason: HOSPADM

## 2021-02-02 RX ORDER — MIDAZOLAM HYDROCHLORIDE 2 MG/2ML
INJECTION, SOLUTION INTRAMUSCULAR; INTRAVENOUS AS NEEDED
Status: DISCONTINUED | OUTPATIENT
Start: 2021-02-02 | End: 2021-02-02

## 2021-02-02 RX ORDER — KETOROLAC TROMETHAMINE 30 MG/ML
15 INJECTION, SOLUTION INTRAMUSCULAR; INTRAVENOUS EVERY 6 HOURS PRN
Status: DISCONTINUED | OUTPATIENT
Start: 2021-02-02 | End: 2021-02-04 | Stop reason: HOSPADM

## 2021-02-02 RX ORDER — PRAZOSIN HYDROCHLORIDE 1 MG/1
1 CAPSULE ORAL
Status: DISCONTINUED | OUTPATIENT
Start: 2021-02-02 | End: 2021-02-04 | Stop reason: HOSPADM

## 2021-02-02 RX ORDER — FENTANYL CITRATE 50 UG/ML
INJECTION, SOLUTION INTRAMUSCULAR; INTRAVENOUS AS NEEDED
Status: DISCONTINUED | OUTPATIENT
Start: 2021-02-02 | End: 2021-02-02

## 2021-02-02 RX ORDER — NEOSTIGMINE METHYLSULFATE 1 MG/ML
INJECTION INTRAVENOUS AS NEEDED
Status: DISCONTINUED | OUTPATIENT
Start: 2021-02-02 | End: 2021-02-02

## 2021-02-02 RX ORDER — MAGNESIUM HYDROXIDE 1200 MG/15ML
LIQUID ORAL AS NEEDED
Status: DISCONTINUED | OUTPATIENT
Start: 2021-02-02 | End: 2021-02-02 | Stop reason: HOSPADM

## 2021-02-02 RX ORDER — FENTANYL CITRATE/PF 50 MCG/ML
25 SYRINGE (ML) INJECTION
Status: COMPLETED | OUTPATIENT
Start: 2021-02-02 | End: 2021-02-02

## 2021-02-02 RX ORDER — SODIUM CHLORIDE, SODIUM LACTATE, POTASSIUM CHLORIDE, CALCIUM CHLORIDE 600; 310; 30; 20 MG/100ML; MG/100ML; MG/100ML; MG/100ML
125 INJECTION, SOLUTION INTRAVENOUS CONTINUOUS
Status: DISCONTINUED | OUTPATIENT
Start: 2021-02-02 | End: 2021-02-04

## 2021-02-02 RX ORDER — SODIUM CHLORIDE 9 MG/ML
125 INJECTION, SOLUTION INTRAVENOUS CONTINUOUS
Status: DISCONTINUED | OUTPATIENT
Start: 2021-02-02 | End: 2021-02-02

## 2021-02-02 RX ORDER — HYDROCHLOROTHIAZIDE 25 MG/1
25 TABLET ORAL DAILY
Status: DISCONTINUED | OUTPATIENT
Start: 2021-02-03 | End: 2021-02-04 | Stop reason: HOSPADM

## 2021-02-02 RX ORDER — FERROUS SULFATE 325(65) MG
325 TABLET ORAL 2 TIMES DAILY WITH MEALS
Status: DISCONTINUED | OUTPATIENT
Start: 2021-02-02 | End: 2021-02-04 | Stop reason: HOSPADM

## 2021-02-02 RX ORDER — METOPROLOL SUCCINATE 25 MG/1
12.5 TABLET, EXTENDED RELEASE ORAL DAILY
Status: DISCONTINUED | OUTPATIENT
Start: 2021-02-03 | End: 2021-02-04 | Stop reason: HOSPADM

## 2021-02-02 RX ORDER — HYDROMORPHONE HCL/PF 1 MG/ML
0.5 SYRINGE (ML) INJECTION
Status: DISCONTINUED | OUTPATIENT
Start: 2021-02-02 | End: 2021-02-02 | Stop reason: HOSPADM

## 2021-02-02 RX ORDER — ROCURONIUM BROMIDE 10 MG/ML
INJECTION, SOLUTION INTRAVENOUS AS NEEDED
Status: DISCONTINUED | OUTPATIENT
Start: 2021-02-02 | End: 2021-02-02

## 2021-02-02 RX ORDER — HYDROMORPHONE HCL/PF 1 MG/ML
0.5 SYRINGE (ML) INJECTION EVERY 2 HOUR PRN
Status: DISCONTINUED | OUTPATIENT
Start: 2021-02-02 | End: 2021-02-04 | Stop reason: HOSPADM

## 2021-02-02 RX ORDER — ALBUTEROL SULFATE 2.5 MG/3ML
2.5 SOLUTION RESPIRATORY (INHALATION) ONCE AS NEEDED
Status: DISCONTINUED | OUTPATIENT
Start: 2021-02-02 | End: 2021-02-02 | Stop reason: HOSPADM

## 2021-02-02 RX ORDER — DOCUSATE SODIUM 100 MG/1
100 CAPSULE, LIQUID FILLED ORAL 2 TIMES DAILY
Status: DISCONTINUED | OUTPATIENT
Start: 2021-02-02 | End: 2021-02-04 | Stop reason: HOSPADM

## 2021-02-02 RX ORDER — ACETAMINOPHEN 325 MG/1
650 TABLET ORAL EVERY 6 HOURS PRN
Status: DISCONTINUED | OUTPATIENT
Start: 2021-02-02 | End: 2021-02-04 | Stop reason: HOSPADM

## 2021-02-02 RX ORDER — KETOROLAC TROMETHAMINE 30 MG/ML
15 INJECTION, SOLUTION INTRAMUSCULAR; INTRAVENOUS EVERY 6 HOURS PRN
Status: DISCONTINUED | OUTPATIENT
Start: 2021-02-02 | End: 2021-02-02

## 2021-02-02 RX ORDER — SENNOSIDES 8.6 MG
1 TABLET ORAL DAILY
Status: DISCONTINUED | OUTPATIENT
Start: 2021-02-02 | End: 2021-02-04 | Stop reason: HOSPADM

## 2021-02-02 RX ORDER — DABIGATRAN ETEXILATE 75 MG/1
75 CAPSULE, COATED PELLETS ORAL EVERY 12 HOURS SCHEDULED
Status: DISCONTINUED | OUTPATIENT
Start: 2021-02-03 | End: 2021-02-04 | Stop reason: HOSPADM

## 2021-02-02 RX ORDER — GLYCOPYRROLATE 0.2 MG/ML
INJECTION INTRAMUSCULAR; INTRAVENOUS AS NEEDED
Status: DISCONTINUED | OUTPATIENT
Start: 2021-02-02 | End: 2021-02-02

## 2021-02-02 RX ORDER — PROPOFOL 10 MG/ML
INJECTION, EMULSION INTRAVENOUS AS NEEDED
Status: DISCONTINUED | OUTPATIENT
Start: 2021-02-02 | End: 2021-02-02

## 2021-02-02 RX ORDER — ROPIVACAINE HYDROCHLORIDE 5 MG/ML
INJECTION, SOLUTION EPIDURAL; INFILTRATION; PERINEURAL
Status: COMPLETED | OUTPATIENT
Start: 2021-02-02 | End: 2021-02-02

## 2021-02-02 RX ORDER — HYDROMORPHONE HCL/PF 1 MG/ML
SYRINGE (ML) INJECTION AS NEEDED
Status: DISCONTINUED | OUTPATIENT
Start: 2021-02-02 | End: 2021-02-02

## 2021-02-02 RX ORDER — ASCORBIC ACID 500 MG
1000 TABLET ORAL DAILY
Status: DISCONTINUED | OUTPATIENT
Start: 2021-02-03 | End: 2021-02-04 | Stop reason: HOSPADM

## 2021-02-02 RX ORDER — DEXMEDETOMIDINE HYDROCHLORIDE 100 UG/ML
INJECTION, SOLUTION INTRAVENOUS AS NEEDED
Status: DISCONTINUED | OUTPATIENT
Start: 2021-02-02 | End: 2021-02-02

## 2021-02-02 RX ORDER — LIDOCAINE HYDROCHLORIDE 20 MG/ML
INJECTION, SOLUTION EPIDURAL; INFILTRATION; INTRACAUDAL; PERINEURAL AS NEEDED
Status: DISCONTINUED | OUTPATIENT
Start: 2021-02-02 | End: 2021-02-02

## 2021-02-02 RX ORDER — OXYCODONE HYDROCHLORIDE 10 MG/1
10 TABLET ORAL EVERY 4 HOURS PRN
Status: DISCONTINUED | OUTPATIENT
Start: 2021-02-02 | End: 2021-02-04 | Stop reason: HOSPADM

## 2021-02-02 RX ORDER — SODIUM CHLORIDE, SODIUM LACTATE, POTASSIUM CHLORIDE, CALCIUM CHLORIDE 600; 310; 30; 20 MG/100ML; MG/100ML; MG/100ML; MG/100ML
1.5 INJECTION, SOLUTION INTRAVENOUS CONTINUOUS
Status: DISCONTINUED | OUTPATIENT
Start: 2021-02-02 | End: 2021-02-02

## 2021-02-02 RX ORDER — PROMETHAZINE HYDROCHLORIDE 25 MG/ML
12.5 INJECTION, SOLUTION INTRAMUSCULAR; INTRAVENOUS ONCE AS NEEDED
Status: DISCONTINUED | OUTPATIENT
Start: 2021-02-02 | End: 2021-02-02 | Stop reason: HOSPADM

## 2021-02-02 RX ORDER — OXYCODONE HYDROCHLORIDE 5 MG/1
5 TABLET ORAL EVERY 4 HOURS PRN
Status: DISCONTINUED | OUTPATIENT
Start: 2021-02-02 | End: 2021-02-04 | Stop reason: HOSPADM

## 2021-02-02 RX ADMIN — PROPOFOL 200 MG: 10 INJECTION, EMULSION INTRAVENOUS at 07:47

## 2021-02-02 RX ADMIN — LIDOCAINE HYDROCHLORIDE 100 MG: 20 INJECTION, SOLUTION EPIDURAL; INFILTRATION; INTRACAUDAL; PERINEURAL at 07:47

## 2021-02-02 RX ADMIN — ROPIVACAINE HYDROCHLORIDE 20 ML: 5 INJECTION, SOLUTION EPIDURAL; INFILTRATION; PERINEURAL at 07:10

## 2021-02-02 RX ADMIN — MIDAZOLAM 2 MG: 1 INJECTION INTRAMUSCULAR; INTRAVENOUS at 07:43

## 2021-02-02 RX ADMIN — DEXAMETHASONE SODIUM PHOSPHATE 4 MG: 4 INJECTION INTRA-ARTICULAR; INTRALESIONAL; INTRAMUSCULAR; INTRAVENOUS; SOFT TISSUE at 07:50

## 2021-02-02 RX ADMIN — SODIUM CHLORIDE, SODIUM LACTATE, POTASSIUM CHLORIDE, AND CALCIUM CHLORIDE 125 ML/HR: .6; .31; .03; .02 INJECTION, SOLUTION INTRAVENOUS at 20:14

## 2021-02-02 RX ADMIN — FENTANYL CITRATE 100 MCG: 50 INJECTION, SOLUTION INTRAMUSCULAR; INTRAVENOUS at 08:10

## 2021-02-02 RX ADMIN — OXYCODONE HYDROCHLORIDE 10 MG: 10 TABLET ORAL at 16:49

## 2021-02-02 RX ADMIN — OXYCODONE HYDROCHLORIDE 10 MG: 10 TABLET ORAL at 12:34

## 2021-02-02 RX ADMIN — NEOSTIGMINE METHYLSULFATE 3 MG: 1 INJECTION INTRAVENOUS at 09:05

## 2021-02-02 RX ADMIN — FENTANYL CITRATE 25 MCG: 50 INJECTION INTRAMUSCULAR; INTRAVENOUS at 09:43

## 2021-02-02 RX ADMIN — CEFAZOLIN SODIUM 2000 MG: 2 SOLUTION INTRAVENOUS at 07:30

## 2021-02-02 RX ADMIN — OXYCODONE HYDROCHLORIDE 10 MG: 10 TABLET ORAL at 22:17

## 2021-02-02 RX ADMIN — SODIUM CHLORIDE: 0.9 INJECTION, SOLUTION INTRAVENOUS at 08:03

## 2021-02-02 RX ADMIN — SENNOSIDES 8.6 MG: 8.6 TABLET ORAL at 13:20

## 2021-02-02 RX ADMIN — HYDROMORPHONE HYDROCHLORIDE 0.5 MG: 1 INJECTION, SOLUTION INTRAMUSCULAR; INTRAVENOUS; SUBCUTANEOUS at 20:12

## 2021-02-02 RX ADMIN — HYDROMORPHONE HYDROCHLORIDE 0.5 MG: 1 INJECTION, SOLUTION INTRAMUSCULAR; INTRAVENOUS; SUBCUTANEOUS at 13:20

## 2021-02-02 RX ADMIN — ONDANSETRON 4 MG: 2 INJECTION INTRAMUSCULAR; INTRAVENOUS at 07:50

## 2021-02-02 RX ADMIN — POTASSIUM CHLORIDE 20 MEQ: 1500 TABLET, EXTENDED RELEASE ORAL at 18:14

## 2021-02-02 RX ADMIN — FENTANYL CITRATE 100 MCG: 50 INJECTION, SOLUTION INTRAMUSCULAR; INTRAVENOUS at 08:16

## 2021-02-02 RX ADMIN — FENTANYL CITRATE 100 MCG: 50 INJECTION, SOLUTION INTRAMUSCULAR; INTRAVENOUS at 07:47

## 2021-02-02 RX ADMIN — HYDROMORPHONE HYDROCHLORIDE 0.5 MG: 1 INJECTION, SOLUTION INTRAMUSCULAR; INTRAVENOUS; SUBCUTANEOUS at 09:59

## 2021-02-02 RX ADMIN — SODIUM CHLORIDE 125 ML/HR: 0.9 INJECTION, SOLUTION INTRAVENOUS at 05:54

## 2021-02-02 RX ADMIN — DEXMEDETOMIDINE HYDROCHLORIDE 28 MCG: 100 INJECTION, SOLUTION INTRAVENOUS at 09:00

## 2021-02-02 RX ADMIN — HYDROMORPHONE HYDROCHLORIDE 0.5 MG: 1 INJECTION, SOLUTION INTRAMUSCULAR; INTRAVENOUS; SUBCUTANEOUS at 10:46

## 2021-02-02 RX ADMIN — LABETALOL 20 MG/4 ML (5 MG/ML) INTRAVENOUS SYRINGE 10 MG: at 08:32

## 2021-02-02 RX ADMIN — FENTANYL CITRATE 25 MCG: 50 INJECTION INTRAMUSCULAR; INTRAVENOUS at 09:39

## 2021-02-02 RX ADMIN — TRANEXAMIC ACID 1 G: 1 INJECTION, SOLUTION INTRAVENOUS at 07:40

## 2021-02-02 RX ADMIN — HYDROMORPHONE HYDROCHLORIDE 1 MG: 1 INJECTION, SOLUTION INTRAMUSCULAR; INTRAVENOUS; SUBCUTANEOUS at 08:23

## 2021-02-02 RX ADMIN — FENTANYL CITRATE 25 MCG: 50 INJECTION INTRAMUSCULAR; INTRAVENOUS at 09:48

## 2021-02-02 RX ADMIN — KETOROLAC TROMETHAMINE 15 MG: 30 INJECTION, SOLUTION INTRAMUSCULAR at 12:33

## 2021-02-02 RX ADMIN — FENTANYL CITRATE 100 MCG: 50 INJECTION, SOLUTION INTRAMUSCULAR; INTRAVENOUS at 07:07

## 2021-02-02 RX ADMIN — ACETAMINOPHEN 650 MG: 325 TABLET ORAL at 20:12

## 2021-02-02 RX ADMIN — FENTANYL CITRATE 25 MCG: 50 INJECTION INTRAMUSCULAR; INTRAVENOUS at 09:34

## 2021-02-02 RX ADMIN — GLYCOPYRROLATE 6 MG: 0.2 INJECTION, SOLUTION INTRAMUSCULAR; INTRAVENOUS at 09:05

## 2021-02-02 RX ADMIN — FERROUS SULFATE TAB 325 MG (65 MG ELEMENTAL FE) 325 MG: 325 (65 FE) TAB at 16:49

## 2021-02-02 RX ADMIN — MIDAZOLAM 2 MG: 1 INJECTION INTRAMUSCULAR; INTRAVENOUS at 07:07

## 2021-02-02 RX ADMIN — HYDROMORPHONE HYDROCHLORIDE 0.5 MG: 1 INJECTION, SOLUTION INTRAMUSCULAR; INTRAVENOUS; SUBCUTANEOUS at 08:44

## 2021-02-02 RX ADMIN — DOCUSATE SODIUM 100 MG: 100 CAPSULE, LIQUID FILLED ORAL at 13:20

## 2021-02-02 RX ADMIN — CEFAZOLIN SODIUM 1000 MG: 1 SOLUTION INTRAVENOUS at 16:49

## 2021-02-02 RX ADMIN — HYDROMORPHONE HYDROCHLORIDE 1 MG: 1 INJECTION, SOLUTION INTRAMUSCULAR; INTRAVENOUS; SUBCUTANEOUS at 08:31

## 2021-02-02 RX ADMIN — Medication 1 TABLET: at 13:20

## 2021-02-02 RX ADMIN — ROCURONIUM BROMIDE 50 MG: 10 INJECTION, SOLUTION INTRAVENOUS at 07:48

## 2021-02-02 RX ADMIN — SODIUM CHLORIDE, SODIUM LACTATE, POTASSIUM CHLORIDE, AND CALCIUM CHLORIDE 125 ML/HR: .6; .31; .03; .02 INJECTION, SOLUTION INTRAVENOUS at 12:25

## 2021-02-02 NOTE — PLAN OF CARE
Problem: PAIN - ADULT  Goal: Verbalizes/displays adequate comfort level or baseline comfort level  Description: Interventions:  - Encourage patient to monitor pain and request assistance  - Assess pain using appropriate pain scale  - Administer analgesics based on type and severity of pain and evaluate response  - Implement non-pharmacological measures as appropriate and evaluate response  - Consider cultural and social influences on pain and pain management  - Notify physician/advanced practitioner if interventions unsuccessful or patient reports new pain  Outcome: Progressing     Problem: INFECTION - ADULT  Goal: Absence or prevention of progression during hospitalization  Description: INTERVENTIONS:  - Assess and monitor for signs and symptoms of infection  - Monitor lab/diagnostic results  - Monitor all insertion sites, i e  indwelling lines, tubes, and drains  - Monitor endotracheal if appropriate and nasal secretions for changes in amount and color  - [place] appropriate cooling/warming therapies per order  - Administer medications as ordered  - Instruct and encourage patient and family to use good hand hygiene technique  - Identify and instruct in appropriate isolation precautions for identified infection/condition  Outcome: Progressing     Problem: SAFETY ADULT  Goal: Patient will remain free of falls  Description: INTERVENTIONS:  - Assess patient frequently for physical needs  -  Identify cognitive and physical deficits and behaviors that affect risk of falls    -  [place] fall precautions as indicated by assessment   - Educate patient/family on patient safety including physical limitations  - Instruct patient to call for assistance with activity based on assessment  - Modify environment to reduce risk of injury  - Consider OT/PT consult to assist with strengthening/mobility  Outcome: Progressing  Goal: Maintain or return to baseline ADL function  Description: INTERVENTIONS:  -  Assess patient's ability to carry out ADLs; assess patient's baseline for ADL function and identify physical deficits which impact ability to perform ADLs (bathing, care of mouth/teeth, toileting, grooming, dressing, etc )  - Assess/evaluate cause of self-care deficits   - Assess range of motion  - Assess patient's mobility; develop plan if impaired  - Assess patient's need for assistive devices and provide as appropriate  - Encourage maximum independence but intervene and supervise when necessary  - Involve family in performance of ADLs  - Assess for home care needs following discharge   - Consider OT consult to assist with ADL evaluation and planning for discharge  - Provide patient education as appropriate  Outcome: Progressing  Goal: Maintain or return mobility status to optimal level  Description: INTERVENTIONS:  - Assess patient's baseline mobility status (ambulation, transfers, stairs, etc )    - Identify cognitive and physical deficits and behaviors that affect mobility  - Identify mobility aids required to assist with transfers and/or ambulation (gait belt, sit-to-stand, lift, walker, cane, etc )  - [place] fall precautions as indicated by assessment  - Record patient progress and toleration of activity level on Mobility SBAR; progress patient to next Phase/Stage  - Instruct patient to call for assistance with activity based on assessment  - Consider rehabilitation consult to assist with strengthening/weightbearing, etc   Outcome: Progressing     Problem: DISCHARGE PLANNING  Goal: Discharge to home or other facility with appropriate resources  Description: INTERVENTIONS:  - Identify barriers to discharge w/patient and caregiver  - Arrange for needed discharge resources and transportation as appropriate  - Identify discharge learning needs (meds, wound care, etc )  - Arrange for interpretive services to assist at discharge as needed  - Refer to Case Management Department for coordinating discharge planning if the patient needs post-hospital services based on physician/advanced practitioner order or complex needs related to functional status, cognitive ability, or social support system  Outcome: Progressing     Problem: Knowledge Deficit  Goal: Patient/family/caregiver demonstrates understanding of disease process, treatment plan, medications, and discharge instructions  Description: Complete learning assessment and assess knowledge base    Interventions:  - Provide teaching at level of understanding  - Provide teaching via preferred learning methods  Outcome: Progressing     Problem: SKIN/TISSUE INTEGRITY - ADULT  Goal: Skin integrity remains intact  Description: INTERVENTIONS  - Identify patients at risk for skin breakdown  - Assess and monitor skin integrity  - Assess and monitor nutrition and hydration status  - Monitor labs (i e  albumin)  - Assess for incontinence   - Turn and reposition patient  - Assist with mobility/ambulation  - Relieve pressure over bony prominences  - Avoid friction and shearing  - Provide appropriate hygiene as needed including keeping skin clean and dry  - Evaluate need for skin moisturizer/barrier cream  - Collaborate with interdisciplinary team (i e  Nutrition, Rehabilitation, etc )   - Patient/family teaching  Outcome: Progressing  Goal: Incision(s), wounds(s) or drain site(s) healing without S/S of infection  Description: INTERVENTIONS  - Assess and document risk factors for skin impairment   - Assess and document dressing, incision, wound bed, drain sites and surrounding tissue  - Consider nutrition services referral as needed  - Oral mucous membranes remain intact  - Provide patient/ family education  Outcome: Progressing     Problem: MUSCULOSKELETAL - ADULT  Goal: Maintain or return mobility to safest level of function  Description: INTERVENTIONS:  - Assess patient's ability to carry out ADLs; assess patient's baseline for ADL function and identify physical deficits which impact ability to perform ADLs (bathing, care of mouth/teeth, toileting, grooming, dressing, etc )  - Assess/evaluate cause of self-care deficits   - Assess range of motion  - Assess patient's mobility  - Assess patient's need for assistive devices and provide as appropriate  - Encourage maximum independence but intervene and supervise when necessary  - Involve family in performance of ADLs  - Assess for home care needs following discharge   - Consider OT consult to assist with ADL evaluation and planning for discharge  - Provide patient education as appropriate  Outcome: Progressing  Goal: Maintain proper alignment of affected body part  Description: INTERVENTIONS:  - Support, maintain and protect limb and body alignment  - Provide patient/ family with appropriate education  Outcome: Progressing

## 2021-02-02 NOTE — OP NOTE
Right Total Knee Procedure Note    Patient Name: Jb Ruiz  MRN: [id number]  Date of Surgery 2/2/2021    Surgeon: Celina Rod MD  Assistant: [facility]    Indications: Degenerative joint disease right knee with failed conservative care  Pre-operative Diagnosis: Right knee degenerative joint disease  Post-operative Diagnosis: Right knee degenerative joint disease  Anesthesia:  Procedure(s) and Anesthesia Type:     * ARTHROPLASTY KNEE TOTAL - Choice    Operative procedure: Right total knee arthroplasty    Implants:   Implant Name Type Inv  Item Serial No   Lot No  LRB No  Used Action   CEMENT BONE SMART SET GRAY MED VISC - [id number]  CEMENT BONE SMART SET GRAY MED VISC  DEPUY [id number] Right 1 Implanted   COMPONENT FEM SZ 8 RT CMNT PS ATTUNE - [id number]  COMPONENT FEM SZ 8 RT CMNT PS ATTUNE  DEPUY [id number] Right 1 Implanted   INSERT TIBIAL 6MM SZ 8 PS ROTPLT ATTUNE - [id number]  INSERT TIBIAL 6MM SZ 8 PS ROTPLT ATTUNE  DEPUY [id number] Right 1 Implanted   BASEPLATE TIBIAL SZ 7 CMNT ROTPLT ATTUNE KNEE SYSTM - [id number]  BASEPLATE TIBIAL SZ 7 CMNT ROTPLT ATTUNE KNEE SYSTM  DEPUY [id number] Right 1 Implanted   COMPONENT PATELLAR 38MM CMNT ATTUNE - [id number]  COMPONENT PATELLAR 38MM CMNT ATTUNE  DEPUY [id number] Right 1 Implanted       Tourniquet time: 31 minutes at 250 mmHg    Drains: None    Estimated blood loss: 50 cc    Antibiotics: Given preoperatively    Clinical note: Jb Ruiz is a 76 y o  male who presents with severe right knee pain and disability  Physical exam investigations was consistent with degenerative joint disease  The patient been treated nonoperatively and failed to improve  Nonoperative versus operative options reviewed  The patient did understand the situation and did wish to proceed with operative management    Description of procedure: The patient was identified as Jb Ruiz and the right knee as the surgical site  Anesthesia was administered    The patient's right lower extremity was elevated and tourniquet applied to the proximal thigh  Right lower extremity was then prepped and free draped in usual fashion for knee surgery  Utilizing an Esmarch bandage, the right lower extremity was stripped followed by inflation of tourniquet  A medial parapatellar approach was made and sharp dissection was carried down through skin and subcutaneous tissue  A medial parapatellar arthrotomy was performed the patella was everted and the knee was flexed  End-stage degenerative changes within the right knee were identified  The cruciate ligament were divided  Utilizing an external tibial cutting guide, the tibial cut was made  The menisci were removed  The femoral cuts were then made utilizing the intramedullary guide system and appropriate cutting jigs  Flexion and extension gaps were found to be satisfactory and the tibia was then machined to accept the tibial component  A trial reduction was carried out and the knee was found to be stable and went through satisfactory range of motion  The patella was cut with freehand technique and appropriately sized followed by placement of a trial component  The patella was noted to track normally and all trial components removed  The knee was then copiously irrigated with saline solution followed by cementing a final components in place starting with the tibia followed by femoral component  The tibial bearing was inserted and knee was held in extension followed by cementing of the patellar component holding it in place with a patellar clamp  Excess cement was removed  The knee was held in extension until the cement cured  The knee was then checked for range of motion and found to be excellent with excellent stability  The tourniquet was released and hemostasis was obtained    The incision was then closed in layers utilizing #1 Vicryl and Stratafix for deep layers, 2-0 Vicryl for subcutaneous closure and a 3-0 Monocryl subcuticular stitch for skin  Steri-Strips were applied  A soft absorbent bandage and Ace wrap was added  The patient be transferred to a stretcher in the supine position and taken to recovery  There were no complications  Throughout the procedure, assistance by Michael Araujo PA-C was required  She was required to aid in positioning the patient preoperatively and intraoperatively she was required to manipulate the patient's right lower extremity as well as various retractors and other equipment under my guidance  On completion of procedure, she was required to aid in closure of the wound and application of bandage  She was also required to aid in transfer the patient to recovery  AP and lateral views of the right knee were obtained in recovery  This demonstrated appropriate positioning total knee arthroplasty components  There was no evidence loosening or failure  There was air in the soft tissues consistent with immediate postoperative status the radiographs          Radha Morin MD    Date: 2/2/2021  Time: 9:13 AM

## 2021-02-02 NOTE — INTERVAL H&P NOTE
H&P reviewed  After examining the patient I find no changes in the patients condition since the H&P had been written      Vitals:    02/02/21 0730   BP: 154/86   Pulse: 78   Resp: 16   Temp:    SpO2: 98%

## 2021-02-02 NOTE — ANESTHESIA POSTPROCEDURE EVALUATION
Post-Op Assessment Note    CV Status:  Stable    Pain management: adequate     Mental Status:  Alert   PONV Controlled:  None   Airway Patency:  Patent  Airway: intubated      Post Op Vitals Reviewed: Yes      Staff: Anesthesiologist       Blood pressure 136/82, pulse 70, temperature 97 8 °F (36 6 °C), resp  rate 16, height 5' 8" (1 727 m), weight 83 9 kg (185 lb), SpO2 96 %  No complications documented      BP      Temp      Pulse     Resp      SpO2

## 2021-02-02 NOTE — ANESTHESIA PREPROCEDURE EVALUATION
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   (+) Symptomatic anemia      MUSCULOSKELETAL   (+) Osteoarthritis of knee      NEURO/PSYCH   (+) Hx of pulmonary embolus   (+) Posttraumatic stress disorder      PULMONARY   (+) Shortness of breath      Other   (+) COVID-19 12/20, resolved   (+) Splenomegaly        Physical Exam    Airway    Mallampati score: II  TM Distance: >3 FB  Neck ROM: full     Dental   No notable dental hx     Cardiovascular  Cardiovascular exam normal    Pulmonary  Pulmonary exam normal     Other Findings        Anesthesia Plan  ASA Score- 3     Anesthesia Type- general and regional with ASA Monitors  Additional Monitors:   Airway Plan:     Comment: GA due to Pradaxa, adductor block for postoperative pain control  Plan Factors-    Chart reviewed  EKG reviewed  Imaging results reviewed  Existing labs reviewed  Induction- intravenous  Postoperative Plan-     Informed Consent- Anesthetic plan and risks discussed with patient

## 2021-02-02 NOTE — PHYSICAL THERAPY NOTE
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    Posttraumatic stress disorder    Essential hypertension    Glucose intolerance (impaired glucose tolerance)    Renal insufficiency    Auto immune neutropenia (HCC)    Osteoarthritis of knee    Pain in joint, lower leg    Pain in left knee    COVID-19    Thrombocytosis ([address] )    Primary localized osteoarthrosis of the knee, right       Past Medical History:   Diagnosis Date    Anxiety     Arthritis     Autoimmune hemolytic anemia     Claustrophobia     DVT (deep venous thrombosis) (HCC)     GERD (gastroesophageal reflux disease)     Hearing loss, right     Hemolytic anemia (HCC)     History of transfusion     2018 - no adverse reaction    Hypertension     Palpitation     Portal vein thrombosis     PTSD (post-traumatic stress disorder)     Pulmonary emboli (HCC)     Tobacco abuse        Past Surgical History:   Procedure Laterality Date    COLONOSCOPY      ELBOW ARTHROPLASTY Left     bursectomy    KNEE SURGERY Right     meniscus tear    [place] LAP,CHOLECYSTECTOMY/GRAPH N/A 12/23/2017    Procedure: CHOLECYSTECTOMY LAPAROSCOPIC with cholangiogram;  Surgeon: Veronica Quezada MD;  Location: [facility];  Service: General    KS REMOVAL SPLEEN, TOTAL N/A 5/18/2017    Procedure: LAPAROSCOPIC HAND ASSIST SPLENECTOMY;  Surgeon: Carol Flor MD;  Location: [facility];  Service: Surgical Oncology    SHOULDER SURGERY Left     rotator cuff x4, reconstruction      02/02/21 1452   PT Last Visit   PT Visit Date 02/02/21   Note Type   Note type Evaluation   Pain Assessment   Pain Assessment Tool 0-10   Pain Score 4   Pain Location/Orientation Orientation: Right;Location: Knee   Hospital Pain Intervention(s) Cold applied;Repositioned; Ambulation/increased activity; Emotional support   Home Living   Type of [address]   Prior Function   Level of [place] Independent with ADLs and functional mobility   Lives With Spouse   Receives Help From Family   ADL Assistance Independent   IADLs Independent   Falls in the last 6 months 0   Comments pt indep PTA, no assistive device  hoping ot golf by april  has cane , shower bars  Restrictions/Precautions   Weight Bearing Precautions Per Order Yes   RLE Weight Bearing Per Order WBAT   Other Precautions Multiple lines;O2;Pain;WBS   Cognition   Overall Cognitive Status WFL   Orientation Level Oriented X4   RUE Assessment   RUE Assessment WNL   LUE Assessment   LUE Assessment WNL   RLE Assessment   RLE Assessment X  (knee arom 10/55*, str 3-/5)   LLE Assessment   LLE Assessment WNL  (notes djd knee)   Coordination   Movements are Fluid and Coordinated 1   Sensation WFL   Bed Mobility   Rolling R 7  Independent   Rolling L 7  Independent   Supine to Sit 4  Minimal assistance   Additional items Increased time required;Verbal cues;LE management   Transfers   Sit to Stand 4  Minimal assistance   Additional items Assist x 1; Increased time required; Impulsive;Verbal cues; Bedrails   Stand to Sit 4  Minimal assistance   Additional items Assist x 1; Armrests; Increased time required;Verbal cues   Ambulation/Elevation   Gait pattern Antalgic   Gait Assistance 4  Minimal assist   Additional items Assist x 1;Verbal cues; Tactile cues   Assistive Device Rolling walker   Distance 40'   Balance   Static Sitting Good   Dynamic Sitting Fair   Static Standing Fair -   Dynamic Standing Fair -   Ambulatory Fair -   Endurance Deficit   Endurance Deficit Yes Endurance Deficit Description fatigue, mild wheezing   Activity Tolerance   Activity Tolerance Patient tolerated treatment well;Patient limited by pain   Nurse Made Aware yes   Assessment   Prognosis Good   Problem List Decreased strength;Decreased range of motion;Decreased endurance; Impaired balance;Decreased mobility; Decreased safety awareness;Decreased skin integrity;Orthopedic restrictions;Pain   Assessment pt admitted with severe r knee pain due to djd and failed conservative care  pt underwent  R TKA and referred to PT  pt was indep PTA  living in 2 story home, owns cane  pt demonstrated mobility at min assist level  pt able to perform bed moiblity with assist rle  standing and amb with rw and supervisoin to min assist  amb 40' using rw wbat rle  tolerated well  pt has current deficits in strength, rom rle  balance, gait sequencing and stability, activity tolerance due to pain, joint and skin integrity  pt will be able to return home, OPPT  pt desires to transition to cruthches for moblity  Barriers to Discharge None   Goals   Patient Goals go home tomorrow   STG Expiration Date 02/06/21   Short Term Goal #1 pt indep with bed mobility, transfers, amb with cutches for > 200'  stairs with railing and device wbat rle and supervisoin  achieve 0/90* knee rom, improve strength and balance by 1/2 grade  indep HEP  Plan   Treatment/Interventions Functional transfer training;LE strengthening/ROM; Elevations; Therapeutic exercise; Endurance training;Patient/family training;Equipment eval/education; Bed mobility;Gait training;Spoke to nursing  (progress to crutches  )   PT Frequency 7x/wk; Twice a day   Recommendation   PT Discharge Recommendation Home with skilled therapy; Return to previous environment with social support  (oppt)   Equipment Recommended   (tbd)   PT - OK to Discharge No   AM-PAC Basic Mobility Inpatient   Turning in Bed Without Bedrails 3   Lying on Back to Sitting on Edge of Flat Bed 3   Moving Bed to Chair 3   Standing Up From Chair 3   Walk in Room 3   Climb 3-5 Stairs 2   Basic Mobility Inpatient Raw Score 17   Basic Mobility Standardized Score 39 67   History: co - morbidities, fall risk, use of assistive device, assist for iadl's, , multiple lines  Exam: impairments in locomotion, musculoskeletal, balance, joint integrity, skin integrity, Clinical: unstable/unpredictable  Complexity:high        Leodan Hernandez, PT

## 2021-02-02 NOTE — ANESTHESIA PROCEDURE NOTES
Peripheral Block    Patient location during procedure: holding area  Start time: 2/2/2021 7:07 AM  Reason for block: at surgeon's request and post-op pain management  Staffing  Anesthesiologist: Fabio Tucker MD  Performed: anesthesiologist   Preanesthetic Checklist  Completed: patient identified, site marked, surgical consent, pre-op evaluation, timeout performed, IV checked, risks and benefits discussed and monitors and equipment checked  Peripheral Block  Patient position: supine  Prep: ChloraPrep  Patient monitoring: frequent blood pressure checks  Block type: adductor canal block  Laterality: right  Injection technique: single-shot  Procedures: ultrasound guided, Ultrasound guidance required for the procedure to increase accuracy and safety of medication placement and decrease risk of complications    Ultrasound permanent image savedropivacaine (NAROPIN) 0 5 % perineural infiltration, 20 mL  Needle  Needle type: Stimuplex   Needle gauge: 21 G  Needle length: 10 cm  Needle localization: ultrasound guidance  Assessment  Injection assessment: incremental injection, local visualized surrounding nerve on ultrasound, negative aspiration for heme and no paresthesia on injection  Paresthesia pain: none  Heart rate change: no  Slow fractionated injection: yes  Post-procedure:  site cleaned  patient tolerated the procedure well with no immediate complications

## 2021-02-02 NOTE — CONSULTS
Consultation - Internal Medicine  Tino Melva 76 y o  male MRN: [id number]  Unit/Bed#: E2 -01 Encounter: [id number]      Impression :-    This is a very delightful  76 y o  male patient, who has Undergone elective right total knee replacement earlier today by Dr Lita Osgood  Advanced degenerative joint disease, failed conservative treatments right knee  Ambulatory dysfunction  Tucker syndrome  Chronic anticoagulation with dabigatran  History of portal vein thrombosis/  History of pulmonary embolism  Autoimmune hemolytic anemia/ splenomegaly  Hypertension  Gastroesophageal reflux disease  Vitamin-D deficiency  History of positive COVID 19 (December 2020 resolved)   Obesity with BMI of 30 5  Mood disorder with history of anxiety  Chronic tobacco abuse  Chronic steroid maintenance for autoimmune hemolytic anemia       Recommendation: -     patient is currently recuperating well following his surgery  He seems to be nontoxic has no acute symptoms of discomfort or pain  He has complicated underlying medical history including autoimmune hemolytic anemia, splenomegaly, chronic anticoagulation for his hypercoagulable condition and has had previous pulmonary embolism and DVTs  Patient will need to be resumed back on his regular doses of Factor Xa inhibition which he takes with Pradaxa under supervision of his outpatient hematologist Dr Oquendo Filter  This can be resumed once cleared by orthopedic team   Would not recommend delaying it too long considering that he will have a high risk of having hypercoagulable event  But at the same time it has to be ensured that he does not bleed from his surgical site  Patient has chronic steroid maintenance would recommend adding higher dose of prednisone to his baseline for surgical stress and then tapering it back to his routine 12 5 mg over next 5-7 days    Monitor his hemodynamics and if any signs of  Adrenal insufficiency then dose can be switched to a higher dose, perhaps with IV steroids at that junction  I have reviewed patients medications as initiated on post operative orders by the primary team  Recommend  monitoring closely for any hypoxemia / respiratory insufficieny related to narcotics and to reduce doses and frequency of narcotics if necessary  Maintain Intravenous Fluids for next 24 -48 hours, till patient able to reliably keep meals and meds in  Suggest  Zofran ODT 4 mg sublingually PRN for control of post operative nausea  Patient encouraged to  use Incentive spirometry q 15 minutes while awake to minimize risk of postoperative atelectasis and pneumonia  Patient verbalized to understand and fully comprehend  Please check patient's hemoglobin and hematocrit within next 24 hours to ensure that there is no acute blood loss anemia which would need any blood transfusion  We will follow this pleasant patient with your service closely and recommend necessary changes based on  further hospital course and diagnostics  Thank you, Dr Erick Oconnor , for your kind consultation  HISTORY OF PRESENT ILLNESS:    Reason for Consult:  Post operative management following elective  Right knee replacement by Dr Erick Oconnor    HPI: Rizwana Mullins is a 76 y o  male  Who has been having disabling pain in his bilateral knees right worse than left for long period of time  He has been treating himself with various conservative treatments including pain medication use of assist devices and braces without any significant benefit patient also had intra-articular injections, physical therapy and lifestyle modification without any benefit  After exhausting various conservative treatment he was referred to see orthopedic team   He was found to have bilateral knee pain with passive range of motion and crepitus  Imaging studies confirmed severity of his arthritis  Please refer to outpatient orthopedic notes which I reviewed in detail    He was then elected to proceed with surgery but in the meantime he was infected with COVID and had to be treated with back in December of 2020  Patient was treated for his COVID infection which was diagnosed on 12/15/2020 and was hospitalized from December 18, 2020 to December 22, 2020 and did not require oxygen that time  He was placed on higher dose of prednisone and had to be tapered down to 12 5 mg which he takes routinely  He takes prednisone for his autoimmune hemolytic anemia  He was thereafter discharged  This reportedly was complicated with renal failure for which he was treated with IV antibiotics and was treated with remdesivir, prednisone  Patient underwent preoperative evaluation by his hematologist on 01/12/2021 where he was advised to resume his   Routine regularanticoagulation with Factor Xa inhibitors 1 day after surgery  Review of Systems   Constitutional:  No recent  appetite change, denies chills, diaphoresis, fatigue or fever  Patient recovered from [place] infection which he had in December and was hospitalized  HEENT:  Negative for congestion, sore throat and tinnitus  No visual complaints  Respiratory:  Negative cough, chest tightness, shortness of breath and wheezing  Cardiovascular:  Negative for chest pain and palpitations  Gastrointestinal: Negative for abdominal distention or pain, constipation, diarrhea and nausea  Endocrine: Negative for cold intolerance, heat intolerance, polydipsia and polyuria  Genitourinary:                Negative for  dysuria, flank pain  No hematuria   Skin:   Negative for color change, pallor and rash  Neurological:   Negative for dizziness, tremors, seizures, syncope, facial asymmetry,   speech difficulty, weakness, light-headedness, numbness and headaches      Hematological:          Musculoskeletal:  Psychiatric:  Patient has underlying history of autoimmune hemolytic anemia and has been on prednisone under supervision of his hematologist   Patient has underlying hypercoagulable condition and has had previous significant events and has been on long-term anticoagulation with Pradaxa  Prior coming to surgery he had withheld this and will be resuming it again  Joint pains as above  Negative for agitation, behavioral problems, confusion,   decreased concentration, dysphoric mood and sleep disturbance  A complete system-based review of systems as discussed with patient is otherwise negative      PAST MEDICAL HISTORY:  Past Medical History:   Diagnosis Date    Anxiety     Arthritis     Autoimmune hemolytic anemia     Claustrophobia     DVT (deep venous thrombosis) (HCC)     GERD (gastroesophageal reflux disease)     Hearing loss, right     Hemolytic anemia (HCC)     History of transfusion     2018 - no adverse reaction    Hypertension     Palpitation     Portal vein thrombosis     PTSD (post-traumatic stress disorder)     Pulmonary emboli (HCC)     Tobacco abuse      Past Surgical History:   Procedure Laterality Date    COLONOSCOPY      ELBOW ARTHROPLASTY Left     bursectomy    KNEE SURGERY Right     meniscus tear    CA LAP,CHOLECYSTECTOMY/GRAPH N/A 12/23/2017    Procedure: CHOLECYSTECTOMY LAPAROSCOPIC with cholangiogram;  Surgeon: Adrienne Alexander MD;  Location: [facility];  Service: General    CA REMOVAL SPLEEN, TOTAL N/A 5/18/2017    Procedure: LAPAROSCOPIC HAND ASSIST SPLENECTOMY;  Surgeon: Crow Farrell MD;  Location: [facility];  Service: Surgical Oncology    SHOULDER SURGERY Left     rotator cuff x4, reconstruction       FAMILY HISTORY:  Non-contributory    SOCIAL HISTORY:  Social History     Substance and Sexual Activity   Alcohol Use Never    Alcohol/week: 6 0 standard drinks    Types: 6 Cans of beer per week    Frequency: Never    Drinks per session: 1 or 2     Social History     Substance and Sexual Activity   Drug Use Not Currently    Types: Marijuana    Comment: medical marijuana     Social History     Tobacco Use Smoking Status Light Tobacco Smoker    Types: Cigars   Smokeless Tobacco Current User    Types: Snuff   Tobacco Comment    occ cigar       ALLERGIES:  Allergies   Allergen Reactions    Iodinated Diagnostic Agents Hives     Unsure if this is still an allergy       MEDICATIONS:  All current active medications have been reviewed    Current Facility-Administered Medications   Medication Dose Route Frequency Provider Last Rate Last Admin    acetaminophen (TYLENOL) tablet 650 mg  650 mg Oral Q6H PRN Verito Coffee, PA-C        ceFAZolin (ANCEF) IVPB (premix in dextrose) 1,000 mg 50 mL  1,000 mg Intravenous Q8H Verito Coffee, PA-C 100 mL/hr at 02/02/21 1649 1,000 mg at 02/02/21 1649    [START ON 2/3/2021] dabigatran etexilate (PRADAXA) capsule 75 mg  75 mg Oral Q12H [facility] Verito Coffee, PA-C        docusate sodium (COLACE) capsule 100 mg  100 mg Oral BID Verito Coffee, PA-C   100 mg at 02/02/21 1320    ferrous sulfate tablet 325 mg  325 mg Oral BID With Meals Verito Coffee, PA-C   325 mg at 02/02/21 1649    HYDROmorphone (DILAUDID) injection 0 5 mg  0 5 mg Intravenous Q2H PRN Verito Coffee, PA-C   0 5 mg at 02/02/21 1320    HYDROmorphone (DILAUDID) injection 0 5 mg  0 5 mg Intravenous Q2H PRN Verito Coffee, PA-C   0 5 mg at 02/02/21 1046    ketorolac (TORADOL) injection 15 mg  15 mg Intravenous Q6H PRN Verito Coffee, PA-C   15 mg at 02/02/21 1233    lactated ringers bolus 1,000 mL  1,000 mL Intravenous Once PRN Verito Coffee, PA-C        And    lactated ringers bolus 1,000 mL  1,000 mL Intravenous Once PRN Verito Coffee, PA-C        lactated ringers infusion  125 mL/hr Intravenous Continuous Verito Coffee, PA-C 125 mL/hr at 02/02/21 1225 125 mL/hr at 02/02/21 1225    multivitamin-minerals (CENTRUM) tablet 1 tablet  1 tablet Oral Daily Verito Coffee, PA-C   1 tablet at 02/02/21 1320    ondansetron (ZOFRAN) injection 4 mg  4 mg Intravenous Q8H PRN Verito Anderson PA-C        oxyCODONE (Kiersten Urrutia) IR tablet 10 mg  10 mg Oral Q4H PRN Jenn Sierra, PA-C   10 mg at 21 1649    oxyCODONE (ROXICODONE) IR tablet 5 mg  5 mg Oral Q4H PRN Jenn Sierra, PA-C        senna (SENOKOT) tablet 8 6 mg  1 tablet Oral Daily Jenn Rigo, PA-C   8 6 mg at 21 1320    simethicone (MYLICON) chewable tablet 80 mg  80 mg Oral 4x Daily PRN Jenn Sierra, PA-C        sodium chloride 0 9 % bolus 1,000 mL  1,000 mL Intravenous Once PRN Jenn Rigo, PA-C        And    sodium chloride 0 9 % bolus 1,000 mL  1,000 mL Intravenous Once PRN Jenn Sierra, PA-C           Medications Prior to Admission   Medication    ascorbic acid (VITAMIN C) 1000 MG tablet    dabigatran etexilate (Pradaxa) 150 mg capsu    hydrochlorothiazide (HYDRODIURIL) 25 mg tablet    metoprolol succinate (TOPROL-XL) 25 mg 24 hr tablet    NON FORMULARY    pantoprazole (PROTONIX) 40 mg tablet    potassium chloride (K-DUR,KLOR-CON) 20 mEq tablet    prazosin (MINIPRESS) 1 mg capsule    predniSONE 2 5 mg tablet    VITAMIN D PO    VITAMIN E PO    zinc sulfate (ZINCATE) 220 mg capsule    chlorhexidine (Hibiclens) 4 % external liquid    predniSONE 10 mg tablet           PHYSICAL EXAM:    Vitals:  Temp:  [97 3 °F (36 3 °C)-98 3 °F (36 8 °C)] 98 3 °F (36 8 °C)  HR:  [66-91] 87  Resp:  [10-19] 18  BP: (117-185)/(75-99) 145/90  SpO2:  [80 %-98 %] 97 %  Temp (24hrs), Av 8 °F (36 6 °C), Min:97 3 °F (36 3 °C), Max:98 3 °F (36 8 °C)  Current: Temperature: 98 3 °F (36 8 °C)  Body mass index is 28 13 kg/m²  General Appearance:    Awake, alert, cooperative, no distress  Head:    Normocephalic, atraumatic   Eyes:    Pupils equal in size,conjunctiva/corneas clear, EOM's intact  Nose:   No evidence of epistaxis/ discharge from nares  Throat:   Lips, mucosa, and tongue normal    Neck:   Supple, symmetrical, trachea midline, no JVP  Lungs:     Bilateral air entry is broncho-alveolar and equal        No crepitation or rales  No pleural rub  Heart:    Regular rate and rhythm, S1S2 normal, no murmur  Abdomen:     Soft, non-tender, no masses, no organomegaly   Musculoskeletal:   Extremities appear normal, atraumatic, no cyanosis or edema  Surgical site right knee joint has clear dressing / no bleeding or hemorrhage apparent  Vascular:   2+ in Posterior tibialis / dorsalis pedis  Skin:   Skin color, texture, turgor normal, no rashes or lesions   Neurologic:   Oriented/ Awake/ facial symmetry maintained  Speech is intact  Muscle bulk and strength is equivocal in B/L Upper and lower extremities except on operated  Right lower limb  Light sensation is intact B/l LE  B/L [place] flexion is WNL  LABS, IMAGING, & OTHER STUDIES:  Lab Results:  I have personally reviewed pertinent labs  Lab Results   Component Value Date     01/08/2021    CO2 30 01/08/2021    BUN 17 01/08/2021    CREATININE 1 32 (H) 01/08/2021    EGFR 56 01/08/2021    GLUCOSE 200 (H) 05/18/2017    CALCIUM 9 7 01/08/2021    AST 29 01/08/2021    ALT 38 01/08/2021    ALKPHOS 80 01/08/2021     Lab Results   Component Value Date    WBC 10 02 01/22/2021    WBC 8 25 01/08/2021    WBC 16 81 (H) 12/22/2020    HGB 14 1 01/22/2021    HGB 13 9 01/08/2021    HGB 8 8 (L) 12/22/2020     (H) 01/22/2021     (H) 01/08/2021     (H) 12/22/2020       Lab Results   Component Value Date    INR 1 03 01/08/2021    INR 1 36 (H) 12/18/2020    INR 1 59 (H) 03/29/2018    HGBA1C 4 4 01/08/2021    HGBA1C 4 5 02/19/2020         Imaging Studies:   I have personally reviewed pertinent imaging study reports  Imaging:     outpatient imaging studies were reviewed      EKG, Pathology, and Other Studies:   I have personally reviewed pertinent reports  electrocardiograms reviewed       Portions of the record may have been created with voice recognition software   Occasional wrong word or "sound a like" substitutions may have occurred due to the inherent limitations of voice recognition software  Read the chart carefully and recognize, using context, where substitutions have occurred

## 2021-02-02 NOTE — PLAN OF CARE
Problem: PHYSICAL THERAPY ADULT  Goal: Performs mobility at highest level of function for planned discharge setting  See evaluation for individualized goals  Description: Treatment/Interventions: Functional transfer training, LE strengthening/ROM, Elevations, Therapeutic exercise, Endurance training, Patient/family training, Equipment eval/education, Bed mobility, Gait training, Spoke to nursing(progress to crutches  )  Equipment Recommended: (tbd)       See flowsheet documentation for full assessment, interventions and recommendations  Note: Prognosis: Good  Problem List: Decreased strength, Decreased range of motion, Decreased endurance, Impaired balance, Decreased mobility, Decreased safety awareness, Decreased skin integrity, Orthopedic restrictions, Pain  Assessment: pt admitted with severe r knee pain due to djd and failed conservative care  pt underwent  R TKA and referred to PT  pt was indep PTA  living in 2 story home, owns cane  pt demonstrated mobility at min assist level  pt able to perform bed moiblity with assist rle  standing and amb with rw and supervisoin to min assist  amb 40' using rw wbat rle  tolerated well  pt has current deficits in strength, rom rle  balance, gait sequencing and stability, activity tolerance due to pain, joint and skin integrity  pt will be able to return home, OPPT  pt desires to transition to cruthches for moblity  Barriers to Discharge: None     PT Discharge Recommendation: Home with skilled therapy, Return to previous environment with social support(oppt)     PT - OK to Discharge: No    See flowsheet documentation for full assessment

## 2021-02-03 ENCOUNTER — APPOINTMENT (OUTPATIENT)
Dept: NON INVASIVE DIAGNOSTICS | Facility: HOSPITAL | Age: 66
DRG: 470 | End: 2021-02-03
Payer: MEDICARE

## 2021-02-03 LAB
ANION GAP SERPL CALCULATED.3IONS-SCNC: 6 MMOL/L (ref 4–13)
BASOPHILS # BLD AUTO: 0.08 THOUSANDS/ΜL (ref 0–0.1)
BASOPHILS NFR BLD AUTO: 1 % (ref 0–1)
BUN SERPL-MCNC: 18 MG/DL (ref 5–25)
CALCIUM SERPL-MCNC: 8.6 MG/DL (ref 8.3–10.1)
CHLORIDE SERPL-SCNC: 106 MMOL/L (ref 100–108)
CO2 SERPL-SCNC: 26 MMOL/L (ref 21–32)
CREAT SERPL-MCNC: 1.09 MG/DL (ref 0.6–1.3)
EOSINOPHIL # BLD AUTO: 0.43 THOUSAND/ΜL (ref 0–0.61)
EOSINOPHIL NFR BLD AUTO: 3 % (ref 0–6)
ERYTHROCYTE [DISTWIDTH] IN BLOOD BY AUTOMATED COUNT: 17.6 % (ref 11.6–15.1)
GFR SERPL CREATININE-BSD FRML MDRD: 70 ML/MIN/1.73SQ M
GLUCOSE SERPL-MCNC: 94 MG/DL (ref 65–140)
HCT VFR BLD AUTO: 28.9 % (ref 36.5–49.3)
HGB BLD-MCNC: 9.8 G/DL (ref 12–17)
IMM GRANULOCYTES # BLD AUTO: 0.11 THOUSAND/UL (ref 0–0.2)
IMM GRANULOCYTES NFR BLD AUTO: 1 % (ref 0–2)
LYMPHOCYTES # BLD AUTO: 2.5 THOUSANDS/ΜL (ref 0.6–4.47)
LYMPHOCYTES NFR BLD AUTO: 16 % (ref 14–44)
MCH RBC QN AUTO: 40.8 PG (ref 26.8–34.3)
MCHC RBC AUTO-ENTMCNC: 33.9 G/DL (ref 31.4–37.4)
MCV RBC AUTO: 120 FL (ref 82–98)
MONOCYTES # BLD AUTO: 2.07 THOUSAND/ΜL (ref 0.17–1.22)
MONOCYTES NFR BLD AUTO: 13 % (ref 4–12)
NEUTROPHILS # BLD AUTO: 10.32 THOUSANDS/ΜL (ref 1.85–7.62)
NEUTS SEG NFR BLD AUTO: 66 % (ref 43–75)
NRBC BLD AUTO-RTO: 0 /100 WBCS
PLATELET # BLD AUTO: 370 THOUSANDS/UL (ref 149–390)
PMV BLD AUTO: 10.6 FL (ref 8.9–12.7)
POTASSIUM SERPL-SCNC: 3.4 MMOL/L (ref 3.5–5.3)
RBC # BLD AUTO: 2.4 MILLION/UL (ref 3.88–5.62)
SODIUM SERPL-SCNC: 138 MMOL/L (ref 136–145)
WBC # BLD AUTO: 15.51 THOUSAND/UL (ref 4.31–10.16)

## 2021-02-03 PROCEDURE — 97116 GAIT TRAINING THERAPY: CPT

## 2021-02-03 PROCEDURE — 97530 THERAPEUTIC ACTIVITIES: CPT

## 2021-02-03 PROCEDURE — 93970 EXTREMITY STUDY: CPT

## 2021-02-03 PROCEDURE — 93970 EXTREMITY STUDY: CPT | Performed by: SURGERY

## 2021-02-03 PROCEDURE — 80048 BASIC METABOLIC PNL TOTAL CA: CPT | Performed by: PHYSICIAN ASSISTANT

## 2021-02-03 PROCEDURE — 97110 THERAPEUTIC EXERCISES: CPT

## 2021-02-03 PROCEDURE — 85025 COMPLETE CBC W/AUTO DIFF WBC: CPT | Performed by: ORTHOPAEDIC SURGERY

## 2021-02-03 RX ORDER — POTASSIUM CHLORIDE 20 MEQ/1
20 TABLET, EXTENDED RELEASE ORAL ONCE
Status: COMPLETED | OUTPATIENT
Start: 2021-02-03 | End: 2021-02-03

## 2021-02-03 RX ORDER — SENNOSIDES 8.6 MG
8.6 TABLET ORAL DAILY
Refills: 0
Start: 2021-02-03 | End: 2021-06-03

## 2021-02-03 RX ORDER — PREDNISONE 1 MG/1
15 TABLET ORAL DAILY
Refills: 0
Start: 2021-02-04 | End: 2021-02-09

## 2021-02-03 RX ORDER — DOCUSATE SODIUM 100 MG/1
100 CAPSULE, LIQUID FILLED ORAL 2 TIMES DAILY
Qty: 10 CAPSULE | Refills: 0
Start: 2021-02-03 | End: 2021-06-03

## 2021-02-03 RX ORDER — OXYCODONE HYDROCHLORIDE 5 MG/1
5 TABLET ORAL EVERY 4 HOURS PRN
Qty: 30 TABLET | Refills: 0
Start: 2021-02-03 | End: 2021-02-13

## 2021-02-03 RX ORDER — FERROUS SULFATE 325(65) MG
325 TABLET ORAL 2 TIMES DAILY WITH MEALS
Refills: 0
Start: 2021-02-03 | End: 2021-06-03

## 2021-02-03 RX ORDER — ACETAMINOPHEN 325 MG/1
650 TABLET ORAL EVERY 6 HOURS PRN
Refills: 0
Start: 2021-02-03

## 2021-02-03 RX ADMIN — OXYCODONE HYDROCHLORIDE 5 MG: 5 TABLET ORAL at 12:52

## 2021-02-03 RX ADMIN — DABIGATRAN ETEXILATE MESYLATE 75 MG: 75 CAPSULE ORAL at 18:32

## 2021-02-03 RX ADMIN — FERROUS SULFATE TAB 325 MG (65 MG ELEMENTAL FE) 325 MG: 325 (65 FE) TAB at 18:32

## 2021-02-03 RX ADMIN — OXYCODONE HYDROCHLORIDE 10 MG: 10 TABLET ORAL at 09:34

## 2021-02-03 RX ADMIN — HYDROMORPHONE HYDROCHLORIDE 0.5 MG: 1 INJECTION, SOLUTION INTRAMUSCULAR; INTRAVENOUS; SUBCUTANEOUS at 20:36

## 2021-02-03 RX ADMIN — FERROUS SULFATE TAB 325 MG (65 MG ELEMENTAL FE) 325 MG: 325 (65 FE) TAB at 08:21

## 2021-02-03 RX ADMIN — METOPROLOL SUCCINATE 12.5 MG: 25 TABLET, EXTENDED RELEASE ORAL at 06:28

## 2021-02-03 RX ADMIN — SODIUM CHLORIDE, SODIUM LACTATE, POTASSIUM CHLORIDE, AND CALCIUM CHLORIDE 125 ML/HR: .6; .31; .03; .02 INJECTION, SOLUTION INTRAVENOUS at 05:39

## 2021-02-03 RX ADMIN — KETOROLAC TROMETHAMINE 15 MG: 30 INJECTION, SOLUTION INTRAMUSCULAR at 12:52

## 2021-02-03 RX ADMIN — HYDROMORPHONE HYDROCHLORIDE 0.5 MG: 1 INJECTION, SOLUTION INTRAMUSCULAR; INTRAVENOUS; SUBCUTANEOUS at 15:18

## 2021-02-03 RX ADMIN — DOCUSATE SODIUM 100 MG: 100 CAPSULE, LIQUID FILLED ORAL at 08:21

## 2021-02-03 RX ADMIN — POTASSIUM CHLORIDE 20 MEQ: 1500 TABLET, EXTENDED RELEASE ORAL at 08:21

## 2021-02-03 RX ADMIN — HYDROMORPHONE HYDROCHLORIDE 0.5 MG: 1 INJECTION, SOLUTION INTRAMUSCULAR; INTRAVENOUS; SUBCUTANEOUS at 08:21

## 2021-02-03 RX ADMIN — CEFAZOLIN SODIUM 1000 MG: 1 SOLUTION INTRAVENOUS at 00:44

## 2021-02-03 RX ADMIN — HYDROCHLOROTHIAZIDE 25 MG: 25 TABLET ORAL at 08:21

## 2021-02-03 RX ADMIN — PREDNISONE 15 MG: 5 TABLET ORAL at 08:21

## 2021-02-03 RX ADMIN — OXYCODONE HYDROCHLORIDE AND ACETAMINOPHEN 1000 MG: 500 TABLET ORAL at 08:21

## 2021-02-03 RX ADMIN — POTASSIUM CHLORIDE 20 MEQ: 1500 TABLET, EXTENDED RELEASE ORAL at 20:23

## 2021-02-03 RX ADMIN — HYDROMORPHONE HYDROCHLORIDE 0.5 MG: 1 INJECTION, SOLUTION INTRAMUSCULAR; INTRAVENOUS; SUBCUTANEOUS at 05:39

## 2021-02-03 RX ADMIN — ACETAMINOPHEN 650 MG: 325 TABLET ORAL at 20:35

## 2021-02-03 RX ADMIN — DOCUSATE SODIUM 100 MG: 100 CAPSULE, LIQUID FILLED ORAL at 18:32

## 2021-02-03 RX ADMIN — Medication 1 TABLET: at 08:21

## 2021-02-03 RX ADMIN — SENNOSIDES 8.6 MG: 8.6 TABLET ORAL at 08:21

## 2021-02-03 RX ADMIN — OXYCODONE HYDROCHLORIDE 10 MG: 10 TABLET ORAL at 18:32

## 2021-02-03 RX ADMIN — PANTOPRAZOLE SODIUM 40 MG: 40 TABLET, DELAYED RELEASE ORAL at 06:27

## 2021-02-03 RX ADMIN — OXYCODONE HYDROCHLORIDE 10 MG: 10 TABLET ORAL at 03:01

## 2021-02-03 NOTE — PROGRESS NOTES
Orthopedic Total Knee Progress Note  ([facility] - Dr Katy Benton)    ASSESSMENT & PLAN:  Status post- RIGHT total knee arthroplasty:  LOS: 0 days   Doing well postoperatively  Pain Relief: Pt comfortable and pain controlled  Continues current post-op course  Activity: up with assistance  Working with PT / OT  Weight Bearing: WBAT      SUBJECTIVE:    Systemic or Specific Complaints: Pain medications covering pain  OBJECTIVE:  Vital signs in last 24 hours:  Temp:  [97 3 °F (36 3 °C)-101 2 °F (38 4 °C)] 99 2 °F (37 3 °C)  HR:  [] 102  Resp:  [10-20] 18  BP: (117-185)/() 154/80  General: alert, appears stated age and cooperative   Neurovascular: Intact    Wound: No Erythema and Wound Intact   Range of Motion: Normal for post surgery   DVT Exam: Negative Tracy's sign  No cords or calf tenderness  No significant calf/ankle edema       Data Review:  CBC:   Lab Results   Component Value Date    WBC 15 51 (H) 02/03/2021    RBC 2 40 (L) 02/03/2021    HGB 9 8 (L) 02/03/2021    HCT 28 9 (L) 02/03/2021     02/03/2021     Plan:  DVT Prophylaxis   Mobilize with PT / OT  D/C Planning for Disposition      Bronson Marcus PA-C  Date: 2/3/2021  Time: 7:55 AM

## 2021-02-03 NOTE — CASE MANAGEMENT
CM met with pt at bedside to discuss discharge needs  Pt lives in a house with his wife  ADL's are completed independently with no DME use  Pt needs a RW and BSC; CM will order  Pt would like to start with HHPT  Pt OK with a referral being sent to [facility]  Spouse will transport home at discharge  No other needs expressed or identified  CM will continue to follow as needed  A post acute care recommendation was made by your care team for [facility] 77  Discussed Freedom of Choice with patient  List of agencies given to patient via in person  patient aware the list is custom filtered for them by preference  and that [facility] post acute providers are designated  CM reviewed d/c planning process including the following: identifying help at home, patient preference for d/c planning needs, Discharge Lounge, [facility] Meds to Bed program, availability of treatment team to discuss questions or concerns patient and/or family may have regarding understanding medications and recognizing signs and symptoms once discharged  CM also encouraged patient to follow up with all recommended appointments after discharge  Patient advised of importance for patient and family to participate in managing patients medical well being

## 2021-02-03 NOTE — PHYSICAL THERAPY NOTE
Physical Therapy Progress Note     02/03/21 1408   Note Type   Note Type Treatment   Pain Assessment   Pain Assessment Tool 0-10   Pain Score 7   Pain Location/Orientation Orientation: Right;Location: Knee  (Reports lateral calf pain)   Hospital Pain Intervention(s) Ambulation/increased activity;Repositioned;Cold applied   Restrictions/Precautions   Weight Bearing Precautions Per Order Yes   RLE Weight Bearing Per Order WBAT   Other Precautions Fall Risk;Pain   General   Chart Reviewed Yes   Response to Previous Treatment Patient reporting fatigue but able to participate  Family/Caregiver Present No   Subjective   Subjective Willing to participate in therapy this PM    Bed Mobility   Supine to Sit 5  Supervision   Additional items Assist x 1;HOB elevated; Bedrails;Leg ; Increased time required;Verbal cues;LE management   Additional Comments Pt  remained seated in bedside chair at end of treatment session  Transfers   Sit to Stand 4  Minimal assistance   Additional items Assist x 1;Bedrails; Increased time required;Verbal cues   Stand to Sit 5  Supervision   Additional items Assist x 1; Armrests; Increased time required;Verbal cues   Ambulation/Elevation   Gait pattern Decreased foot clearance; Forward Flexion; Improper Weight shift; Antalgic;Decreased R stance; Short stride; Step to;Excessively slow; Inconsistent svetlana   Gait Assistance 4  Minimal assist   Additional items Assist x 1; Tactile cues; Verbal cues   Assistive Device Rolling walker   Distance 100'   Balance   Static Sitting Good   Dynamic Sitting Fair   Static Standing Fair -   Dynamic Standing Fair -   Ambulatory Poor +   Endurance Deficit   Endurance Deficit Yes   Endurance Deficit Description fatigue/weakness/pain   Activity Tolerance   Activity Tolerance Patient tolerated treatment well   Nurse Made Aware Yes   Exercises   TKR Supine;Sitting;10 reps;AAROM; Bilateral   Assessment   Prognosis Fair   Problem List Decreased strength;Decreased range of motion;Decreased endurance; Impaired balance;Decreased mobility;Obesity; Decreased skin integrity;Orthopedic restrictions;Pain   Assessment Pt  supine in bed upon my arrival  Pt  reporting fatigue/pain, however agreeable to therapeutic intervention  Pt continues to report R calf pain, RN aware  Performance of HEP with cues provided for proper completion  Progressed with transfers requiring cues for hand placement/technique  Progressed with an amb  trial with use of RW and cues provided for proper completion  Able to progress with an increased amb  trial, with return to seated in bedside chair with ice applied at end of treatment session  Pt  will continue progression of PT goals with intent of d/c home with family support and HHPT provided when medically stable  Barriers to Discharge Inaccessible home environment   Barriers to Discharge Comments SIDRA   Goals   Patient Goals To feel better  STG Expiration Date 02/06/21   PT Treatment Day 2   Plan   Treatment/Interventions LE strengthening/ROM; Functional transfer training; Endurance training; Therapeutic exercise; Bed mobility;Gait training;Spoke to nursing;Spoke to case management   Progress Progressing toward goals   PT Frequency 7x/wk; Twice a day;Weekend   Recommendation   PT Discharge Recommendation Home with skilled therapy; Return to previous environment with social support  (Pt  agreeable to [address])   Equipment Recommended Silas Jefferson; Other (Comment)  (RW, BSC)   PT - OK to Discharge No  (Continued progression of PT goals prior to d/c )     Lennie Yarbrough, PTA

## 2021-02-03 NOTE — PLAN OF CARE
Problem: PHYSICAL THERAPY ADULT  Goal: Performs mobility at highest level of function for planned discharge setting  See evaluation for individualized goals  Description: Treatment/Interventions: Functional transfer training, LE strengthening/ROM, Elevations, Therapeutic exercise, Endurance training, Patient/family training, Equipment eval/education, Bed mobility, Gait training, Spoke to nursing(progress to crutches  )  Equipment Recommended: (tbd)       See flowsheet documentation for full assessment, interventions and recommendations  Outcome: Progressing  Note: Prognosis: Fair  Problem List: Decreased range of motion, Decreased strength, Decreased mobility, Impaired balance, Decreased endurance, Obesity, Decreased skin integrity, Orthopedic restrictions, Pain  Assessment: Pt  supine in bed upon my arrival  Pt  reporting fatigue/pain, however agreeable to therapeutic intervention  Performance of HEP with cues provided for proper completion  Progressed with transfers requiring A of therapist with cues for hand placement/technique  Pt  progressed with an amb  trial with use of RW and cues provided for LE sequencing  Discussed with pt  continued use of RW for amb  trials for increased BUE support  Pt  in agreement and willing to continue  Pt  returned to seated in bedside chair with ice applied at end of treatment session  Pt  reporting increased calf pain in RLE, RN made aware and present at end of treatment session  SCD's remained doffed at end of treatment session due to pt's report  Pt  will continue progression of PT goals with intent of d/c home with HHPT vs  OPPT provided and family support as needed when medically stable     Barriers to Discharge: Inaccessible home environment  Barriers to Discharge Comments: SIDRA  PT Discharge Recommendation: Home with skilled therapy, Return to previous environment with social support(HHPT vs  OPPT)     PT - OK to Discharge: No(Continued progression of PT goals prior to d/c )    See flowsheet documentation for full assessment

## 2021-02-03 NOTE — PLAN OF CARE
Problem: PAIN - ADULT  Goal: Verbalizes/displays adequate comfort level or baseline comfort level  Description: Interventions:  - Encourage patient to monitor pain and request assistance  - Assess pain using appropriate pain scale  - Administer analgesics based on type and severity of pain and evaluate response  - Implement non-pharmacological measures as appropriate and evaluate response  - Consider cultural and social influences on pain and pain management  - Notify physician/advanced practitioner if interventions unsuccessful or patient reports new pain  Outcome: Progressing     Problem: INFECTION - ADULT  Goal: Absence or prevention of progression during hospitalization  Description: INTERVENTIONS:  - Assess and monitor for signs and symptoms of infection  - Monitor lab/diagnostic results  - Monitor all insertion sites, i e  indwelling lines, tubes, and drains  - Monitor endotracheal if appropriate and nasal secretions for changes in amount and color  - [place] appropriate cooling/warming therapies per order  - Administer medications as ordered  - Instruct and encourage patient and family to use good hand hygiene technique  - Identify and instruct in appropriate isolation precautions for identified infection/condition  Outcome: Progressing     Problem: SAFETY ADULT  Goal: Patient will remain free of falls  Description: INTERVENTIONS:  - Assess patient frequently for physical needs  -  Identify cognitive and physical deficits and behaviors that affect risk of falls    -  [place] fall precautions as indicated by assessment   - Educate patient/family on patient safety including physical limitations  - Instruct patient to call for assistance with activity based on assessment  - Modify environment to reduce risk of injury  - Consider OT/PT consult to assist with strengthening/mobility  Outcome: Progressing  Goal: Maintain or return to baseline ADL function  Description: INTERVENTIONS:  -  Assess patient's ability to carry out ADLs; assess patient's baseline for ADL function and identify physical deficits which impact ability to perform ADLs (bathing, care of mouth/teeth, toileting, grooming, dressing, etc )  - Assess/evaluate cause of self-care deficits   - Assess range of motion  - Assess patient's mobility; develop plan if impaired  - Assess patient's need for assistive devices and provide as appropriate  - Encourage maximum independence but intervene and supervise when necessary  - Involve family in performance of ADLs  - Assess for home care needs following discharge   - Consider OT consult to assist with ADL evaluation and planning for discharge  - Provide patient education as appropriate  Outcome: Progressing  Goal: Maintain or return mobility status to optimal level  Description: INTERVENTIONS:  - Assess patient's baseline mobility status (ambulation, transfers, stairs, etc )    - Identify cognitive and physical deficits and behaviors that affect mobility  - Identify mobility aids required to assist with transfers and/or ambulation (gait belt, sit-to-stand, lift, walker, cane, etc )  - [place] fall precautions as indicated by assessment  - Record patient progress and toleration of activity level on Mobility SBAR; progress patient to next Phase/Stage  - Instruct patient to call for assistance with activity based on assessment  - Consider rehabilitation consult to assist with strengthening/weightbearing, etc   Outcome: Progressing     Problem: DISCHARGE PLANNING  Goal: Discharge to home or other facility with appropriate resources  Description: INTERVENTIONS:  - Identify barriers to discharge w/patient and caregiver  - Arrange for needed discharge resources and transportation as appropriate  - Identify discharge learning needs (meds, wound care, etc )  - Arrange for interpretive services to assist at discharge as needed  - Refer to Case Management Department for coordinating discharge planning if the patient needs post-hospital services based on physician/advanced practitioner order or complex needs related to functional status, cognitive ability, or social support system  Outcome: Progressing     Problem: Knowledge Deficit  Goal: Patient/family/caregiver demonstrates understanding of disease process, treatment plan, medications, and discharge instructions  Description: Complete learning assessment and assess knowledge base    Interventions:  - Provide teaching at level of understanding  - Provide teaching via preferred learning methods  Outcome: Progressing     Problem: SKIN/TISSUE INTEGRITY - ADULT  Goal: Skin integrity remains intact  Description: INTERVENTIONS  - Identify patients at risk for skin breakdown  - Assess and monitor skin integrity  - Assess and monitor nutrition and hydration status  - Monitor labs (i e  albumin)  - Assess for incontinence   - Turn and reposition patient  - Assist with mobility/ambulation  - Relieve pressure over bony prominences  - Avoid friction and shearing  - Provide appropriate hygiene as needed including keeping skin clean and dry  - Evaluate need for skin moisturizer/barrier cream  - Collaborate with interdisciplinary team (i e  Nutrition, Rehabilitation, etc )   - Patient/family teaching  Outcome: Progressing  Goal: Incision(s), wounds(s) or drain site(s) healing without S/S of infection  Description: INTERVENTIONS  - Assess and document risk factors for skin impairment   - Assess and document dressing, incision, wound bed, drain sites and surrounding tissue  - Consider nutrition services referral as needed  - Oral mucous membranes remain intact  - Provide patient/ family education  Outcome: Progressing     Problem: MUSCULOSKELETAL - ADULT  Goal: Maintain or return mobility to safest level of function  Description: INTERVENTIONS:  - Assess patient's ability to carry out ADLs; assess patient's baseline for ADL function and identify physical deficits which impact ability to perform ADLs (bathing, care of mouth/teeth, toileting, grooming, dressing, etc )  - Assess/evaluate cause of self-care deficits   - Assess range of motion  - Assess patient's mobility  - Assess patient's need for assistive devices and provide as appropriate  - Encourage maximum independence but intervene and supervise when necessary  - Involve family in performance of ADLs  - Assess for home care needs following discharge   - Consider OT consult to assist with ADL evaluation and planning for discharge  - Provide patient education as appropriate  Outcome: Progressing  Goal: Maintain proper alignment of affected body part  Description: INTERVENTIONS:  - Support, maintain and protect limb and body alignment  - Provide patient/ family with appropriate education  Outcome: Progressing     Problem: Potential for Falls  Goal: Patient will remain free of falls  Description: INTERVENTIONS:  - Assess patient frequently for physical needs  -  Identify cognitive and physical deficits and behaviors that affect risk of falls    -  [place] fall precautions as indicated by assessment   - Educate patient/family on patient safety including physical limitations  - Instruct patient to call for assistance with activity based on assessment  - Modify environment to reduce risk of injury  - Consider OT/PT consult to assist with strengthening/mobility  Outcome: Progressing

## 2021-02-03 NOTE — PLAN OF CARE
Problem: PAIN - ADULT  Goal: Verbalizes/displays adequate comfort level or baseline comfort level  Description: Interventions:  - Encourage patient to monitor pain and request assistance  - Assess pain using appropriate pain scale  - Administer analgesics based on type and severity of pain and evaluate response  - Implement non-pharmacological measures as appropriate and evaluate response  - Consider cultural and social influences on pain and pain management  - Notify physician/advanced practitioner if interventions unsuccessful or patient reports new pain  Outcome: Progressing     Problem: INFECTION - ADULT  Goal: Absence or prevention of progression during hospitalization  Description: INTERVENTIONS:  - Assess and monitor for signs and symptoms of infection  - Monitor lab/diagnostic results  - Monitor all insertion sites, i e  indwelling lines, tubes, and drains  - Monitor endotracheal if appropriate and nasal secretions for changes in amount and color  - [place] appropriate cooling/warming therapies per order  - Administer medications as ordered  - Instruct and encourage patient and family to use good hand hygiene technique  - Identify and instruct in appropriate isolation precautions for identified infection/condition  Outcome: Progressing     Problem: SAFETY ADULT  Goal: Patient will remain free of falls  Description: INTERVENTIONS:  - Assess patient frequently for physical needs  -  Identify cognitive and physical deficits and behaviors that affect risk of falls    -  [place] fall precautions as indicated by assessment   - Educate patient/family on patient safety including physical limitations  - Instruct patient to call for assistance with activity based on assessment  - Modify environment to reduce risk of injury  - Consider OT/PT consult to assist with strengthening/mobility  Outcome: Progressing  Goal: Maintain or return to baseline ADL function  Description: INTERVENTIONS:  -  Assess patient's ability to carry out ADLs; assess patient's baseline for ADL function and identify physical deficits which impact ability to perform ADLs (bathing, care of mouth/teeth, toileting, grooming, dressing, etc )  - Assess/evaluate cause of self-care deficits   - Assess range of motion  - Assess patient's mobility; develop plan if impaired  - Assess patient's need for assistive devices and provide as appropriate  - Encourage maximum independence but intervene and supervise when necessary  - Involve family in performance of ADLs  - Assess for home care needs following discharge   - Consider OT consult to assist with ADL evaluation and planning for discharge  - Provide patient education as appropriate  Outcome: Progressing  Goal: Maintain or return mobility status to optimal level  Description: INTERVENTIONS:  - Assess patient's baseline mobility status (ambulation, transfers, stairs, etc )    - Identify cognitive and physical deficits and behaviors that affect mobility  - Identify mobility aids required to assist with transfers and/or ambulation (gait belt, sit-to-stand, lift, walker, cane, etc )  - [place] fall precautions as indicated by assessment  - Record patient progress and toleration of activity level on Mobility SBAR; progress patient to next Phase/Stage  - Instruct patient to call for assistance with activity based on assessment  - Consider rehabilitation consult to assist with strengthening/weightbearing, etc   Outcome: Progressing     Problem: DISCHARGE PLANNING  Goal: Discharge to home or other facility with appropriate resources  Description: INTERVENTIONS:  - Identify barriers to discharge w/patient and caregiver  - Arrange for needed discharge resources and transportation as appropriate  - Identify discharge learning needs (meds, wound care, etc )  - Arrange for interpretive services to assist at discharge as needed  - Refer to Case Management Department for coordinating discharge planning if the patient needs post-hospital services based on physician/advanced practitioner order or complex needs related to functional status, cognitive ability, or social support system  Outcome: Progressing     Problem: Knowledge Deficit  Goal: Patient/family/caregiver demonstrates understanding of disease process, treatment plan, medications, and discharge instructions  Description: Complete learning assessment and assess knowledge base    Interventions:  - Provide teaching at level of understanding  - Provide teaching via preferred learning methods  Outcome: Progressing     Problem: SKIN/TISSUE INTEGRITY - ADULT  Goal: Skin integrity remains intact  Description: INTERVENTIONS  - Identify patients at risk for skin breakdown  - Assess and monitor skin integrity  - Assess and monitor nutrition and hydration status  - Monitor labs (i e  albumin)  - Assess for incontinence   - Turn and reposition patient  - Assist with mobility/ambulation  - Relieve pressure over bony prominences  - Avoid friction and shearing  - Provide appropriate hygiene as needed including keeping skin clean and dry  - Evaluate need for skin moisturizer/barrier cream  - Collaborate with interdisciplinary team (i e  Nutrition, Rehabilitation, etc )   - Patient/family teaching  Outcome: Progressing  Goal: Incision(s), wounds(s) or drain site(s) healing without S/S of infection  Description: INTERVENTIONS  - Assess and document risk factors for skin impairment   - Assess and document dressing, incision, wound bed, drain sites and surrounding tissue  - Consider nutrition services referral as needed  - Oral mucous membranes remain intact  - Provide patient/ family education  Outcome: Progressing     Problem: MUSCULOSKELETAL - ADULT  Goal: Maintain or return mobility to safest level of function  Description: INTERVENTIONS:  - Assess patient's ability to carry out ADLs; assess patient's baseline for ADL function and identify physical deficits which impact ability to perform ADLs (bathing, care of mouth/teeth, toileting, grooming, dressing, etc )  - Assess/evaluate cause of self-care deficits   - Assess range of motion  - Assess patient's mobility  - Assess patient's need for assistive devices and provide as appropriate  - Encourage maximum independence but intervene and supervise when necessary  - Involve family in performance of ADLs  - Assess for home care needs following discharge   - Consider OT consult to assist with ADL evaluation and planning for discharge  - Provide patient education as appropriate  Outcome: Progressing  Goal: Maintain proper alignment of affected body part  Description: INTERVENTIONS:  - Support, maintain and protect limb and body alignment  - Provide patient/ family with appropriate education  Outcome: Progressing     Problem: Potential for Falls  Goal: Patient will remain free of falls  Description: INTERVENTIONS:  - Assess patient frequently for physical needs  -  Identify cognitive and physical deficits and behaviors that affect risk of falls    -  [place] fall precautions as indicated by assessment   - Educate patient/family on patient safety including physical limitations  - Instruct patient to call for assistance with activity based on assessment  - Modify environment to reduce risk of injury  - Consider OT/PT consult to assist with strengthening/mobility  Outcome: Progressing

## 2021-02-03 NOTE — PLAN OF CARE
Problem: PHYSICAL THERAPY ADULT  Goal: Performs mobility at highest level of function for planned discharge setting  See evaluation for individualized goals  Description: Treatment/Interventions: Functional transfer training, LE strengthening/ROM, Elevations, Therapeutic exercise, Endurance training, Patient/family training, Equipment eval/education, Bed mobility, Gait training, Spoke to nursing(progress to crutches  )  Equipment Recommended: (tbd)       See flowsheet documentation for full assessment, interventions and recommendations  2/3/2021 1511 by Emmanuel England PTA  Outcome: Progressing  Note: Prognosis: Fair  Problem List: Decreased strength, Decreased range of motion, Decreased endurance, Impaired balance, Decreased mobility, Obesity, Decreased skin integrity, Orthopedic restrictions, Pain  Assessment: Pt  supine in bed upon my arrival  Pt  reporting fatigue/pain, however agreeable to therapeutic intervention  Pt continues to report R calf pain, RN aware  Performance of HEP with cues provided for proper completion  Progressed with transfers requiring cues for hand placement/technique  Progressed with an amb  trial with use of RW and cues provided for proper completion  Able to progress with an increased amb  trial, with return to seated in bedside chair with ice applied at end of treatment session  Pt  will continue progression of PT goals with intent of d/c home with family support and HHPT provided when medically stable  Barriers to Discharge: Inaccessible home environment  Barriers to Discharge Comments: SIDRA  PT Discharge Recommendation: Home with skilled therapy, Return to previous environment with social support(Pt  agreeable to HHPT)     PT - OK to Discharge: No(Continued progression of PT goals prior to d/c )    See flowsheet documentation for full assessment       2/3/2021 1100 by Emmanuel England PTA  Outcome: Progressing  Note: Prognosis: Fair  Problem List: Decreased range of motion, Decreased strength, Decreased mobility, Impaired balance, Decreased endurance, Obesity, Decreased skin integrity, Orthopedic restrictions, Pain  Assessment: Pt  supine in bed upon my arrival  Pt  reporting fatigue/pain, however agreeable to therapeutic intervention  Performance of HEP with cues provided for proper completion  Progressed with transfers requiring A of therapist with cues for hand placement/technique  Pt  progressed with an amb  trial with use of RW and cues provided for LE sequencing  Discussed with pt  continued use of RW for amb  trials for increased BUE support  Pt  in agreement and willing to continue  Pt  returned to seated in bedside chair with ice applied at end of treatment session  Pt  reporting increased calf pain in RLE, RN made aware and present at end of treatment session  SCD's remained doffed at end of treatment session due to pt's report  Pt  will continue progression of PT goals with intent of d/c home with HHPT vs  OPPT provided and family support as needed when medically stable  Barriers to Discharge: Inaccessible home environment  Barriers to Discharge Comments: SIDRA  PT Discharge Recommendation: Home with skilled therapy, Return to previous environment with social support(HHPT vs  OPPT)     PT - OK to Discharge: No(Continued progression of PT goals prior to d/c )    See flowsheet documentation for full assessment

## 2021-02-03 NOTE — PHYSICAL THERAPY NOTE
Physical Therapy Progress Note     02/03/21 1000   Note Type   Note Type Treatment   Pain Assessment   Pain Assessment Tool 0-10   Pain Score 9   Pain Location/Orientation Orientation: Right;Location: Knee  (Reports lateral calf pain)   Hospital Pain Intervention(s) Ambulation/increased activity;Repositioned;Cold applied   Restrictions/Precautions   Weight Bearing Precautions Per Order Yes   RLE Weight Bearing Per Order WBAT   Other Precautions Fall Risk;Pain   General   Chart Reviewed Yes   Response to Previous Treatment Patient reporting fatigue but able to participate  Family/Caregiver Present No   Subjective   Subjective Willing to participate in therapy this AM    Bed Mobility   Supine to Sit 4  Minimal assistance   Additional items Assist x 1;HOB elevated; Bedrails;Leg ; Increased time required;Verbal cues;LE management   Additional Comments Pt  remained seated in bedside chair at end of treatment session  Transfers   Sit to Stand 4  Minimal assistance   Additional items Assist x 1;Bedrails; Increased time required;Verbal cues   Stand to Sit 5  Supervision   Additional items Assist x 1; Armrests; Increased time required;Verbal cues   Ambulation/Elevation   Gait pattern Decreased foot clearance; Forward Flexion; Improper Weight shift; Antalgic;Decreased R stance; Short stride; Excessively slow; Step to; Inconsistent svetlana   Gait Assistance 4  Minimal assist   Additional items Assist x 1;Verbal cues; Tactile cues   Assistive Device Rolling walker   Distance 55'   Balance   Static Sitting Good   Dynamic Sitting Fair   Static Standing Fair -   Dynamic Standing Fair -   Ambulatory Poor +   Endurance Deficit   Endurance Deficit Yes   Endurance Deficit Description fatigue/weakness/pain   Activity Tolerance   Activity Tolerance Patient limited by fatigue;Patient limited by pain   Nurse Made Aware Yes   Exercises   TKR Sitting;Supine;10 reps;AAROM; Bilateral   Assessment   Prognosis Fair   Problem List Decreased range of motion;Decreased strength;Decreased mobility; Impaired balance;Decreased endurance;Obesity; Decreased skin integrity;Orthopedic restrictions;Pain   Assessment Pt  supine in bed upon my arrival  Pt  reporting fatigue/pain, however agreeable to therapeutic intervention  Performance of HEP with cues provided for proper completion  Progressed with transfers requiring A of therapist with cues for hand placement/technique  Pt  progressed with an amb  trial with use of RW and cues provided for LE sequencing  Discussed with pt  continued use of RW for amb  trials for increased BUE support  Pt  in agreement and willing to continue  Pt  returned to seated in bedside chair with ice applied at end of treatment session  Pt  reporting increased calf pain in RLE, RN made aware and present at end of treatment session  SCD's remained doffed at end of treatment session due to pt's report  Pt  will continue progression of PT goals with intent of d/c home with HHPT vs  OPPT provided and family support as needed when medically stable  Barriers to Discharge Inaccessible home environment   Barriers to Discharge Comments SIDRA   Goals   Patient Goals To feel better  STG Expiration Date 02/06/21   PT Treatment Day 1   Plan   Treatment/Interventions Functional transfer training;LE strengthening/ROM; Therapeutic exercise; Endurance training;Bed mobility;Gait training;Spoke to nursing;Spoke to case management   Progress Progressing toward goals   PT Frequency 7x/wk; Twice a day;Weekend   Recommendation   PT Discharge Recommendation Home with skilled therapy; Return to previous environment with social support  (HHPT vs  OPPT)   Equipment Recommended Walker  (RW)   PT - OK to Discharge No  (Continued progression of PT goals prior to d/c )     Melissa Hickman, PTA

## 2021-02-03 NOTE — UTILIZATION REVIEW
Initial Clinical Review    Elective   Op   surgical procedure    02/03/21 1646  Inpatient Admission Once     Question Answer Comment   Level of Care Med Surg    Estimated length of stay More than 2 Midnights    Certification I certify that inpatient services are medically necessary for this patient for a duration of greater than two midnights  See H&P and MD Progress Notes for additional information about the patient's course of treatment  02/03/21 1645         Age/Sex: 76 y o  male     Surgery Date: 2/2/21    Procedure: Right total knee arthroplasty    Anesthesia:   choice        POD#1 Progress Note:   2/3   Continue post op care  Continue  PT/OT/WBAT   RLE   Continue pain control as needed  Admission Orders: Date/Time/Statement:   No orders of the defined types were placed in this encounter      Vital Signs: /87 (BP Location: Left arm)   Pulse 102   Temp 97 7 °F (36 5 °C) (Temporal)   Resp 18   Ht 5' 8" (1 727 m)   Wt 83 9 kg (185 lb)   SpO2 94%   BMI 28 13 kg/m²        Diet:   regular      Mobility:    PT/OT    DVT Prophylaxis:    SCD'S    Medications/Pain Control:   Scheduled Medications:  ascorbic acid, 1,000 mg, Oral, Daily  dabigatran etexilate, 75 mg, Oral, Q12H RAVIN  docusate sodium, 100 mg, Oral, BID  ferrous sulfate, 325 mg, Oral, BID With Meals  hydrochlorothiazide, 25 mg, Oral, Daily  metoprolol succinate, 12 5 mg, Oral, Daily  multivitamin-minerals, 1 tablet, Oral, Daily  pantoprazole, 40 mg, Oral, Daily Before Breakfast  potassium chloride, 20 mEq, Oral, Daily  prazosin, 1 mg, Oral, HS  predniSONE, 15 mg, Oral, Daily  senna, 1 tablet, Oral, Daily      Continuous IV Infusions:  lactated ringers, 125 mL/hr, Intravenous, Continuous      PRN Meds:  acetaminophen, 650 mg, Oral, Q6H PRN  HYDROmorphone, 0 5 mg, Intravenous, Q2H PRN  HYDROmorphone, 0 5 mg, Intravenous, Q2H PRN  ketorolac, 15 mg, Intravenous, Q6H PRN  lactated ringers, 1,000 mL, Intravenous, Once PRN    And  lactated ringers, 1,000 mL, Intravenous, Once PRN  ondansetron, 4 mg, Intravenous, Q8H PRN  oxyCODONE, 10 mg, Oral, Q4H PRN  oxyCODONE, 5 mg, Oral, Q4H PRN  simethicone, 80 mg, Oral, 4x Daily PRN  sodium chloride, 1,000 mL, Intravenous, Once PRN    And  sodium chloride, 1,000 mL, Intravenous, Once PRN    neurovascular checks Q 4 hrs    Network Utilization Review Department  ATTENTION: Please call with any questions or concerns to [phone] and carefully listen to the prompts so that you are directed to the right person  All voicemails are confidential   Stephan Nalis all requests for admission clinical reviews, approved or denied determinations and any other requests to dedicated fax number below belonging to the campus where the patient is receiving treatment   List of dedicated fax numbers for the Facilities:  [address] DENIALS (Administrative/Medical Necessity) [phone]   [address] (Maternity/NICU/Pediatrics) [phone]   [address] [place] [address] [address] [place] [address]) [address] [place] [address] [address] [place] [address] [phone]

## 2021-02-04 ENCOUNTER — TELEPHONE (OUTPATIENT)
Dept: HEMATOLOGY ONCOLOGY | Facility: CLINIC | Age: 66
End: 2021-02-04

## 2021-02-04 VITALS
HEIGHT: 68 IN | DIASTOLIC BLOOD PRESSURE: 99 MMHG | HEART RATE: 106 BPM | RESPIRATION RATE: 18 BRPM | OXYGEN SATURATION: 97 % | BODY MASS INDEX: 28.04 KG/M2 | WEIGHT: 185 LBS | TEMPERATURE: 99.4 F | SYSTOLIC BLOOD PRESSURE: 165 MMHG

## 2021-02-04 DIAGNOSIS — U07.1 COVID-19 VIRUS INFECTION: ICD-10-CM

## 2021-02-04 LAB
ANION GAP SERPL CALCULATED.3IONS-SCNC: 9 MMOL/L (ref 4–13)
BUN SERPL-MCNC: 22 MG/DL (ref 5–25)
CALCIUM SERPL-MCNC: 9.4 MG/DL (ref 8.3–10.1)
CHLORIDE SERPL-SCNC: 103 MMOL/L (ref 100–108)
CO2 SERPL-SCNC: 26 MMOL/L (ref 21–32)
CREAT SERPL-MCNC: 1.14 MG/DL (ref 0.6–1.3)
GFR SERPL CREATININE-BSD FRML MDRD: 67 ML/MIN/1.73SQ M
GLUCOSE SERPL-MCNC: 112 MG/DL (ref 65–140)
POTASSIUM SERPL-SCNC: 3.5 MMOL/L (ref 3.5–5.3)
SODIUM SERPL-SCNC: 138 MMOL/L (ref 136–145)

## 2021-02-04 PROCEDURE — 97116 GAIT TRAINING THERAPY: CPT

## 2021-02-04 PROCEDURE — 97166 OT EVAL MOD COMPLEX 45 MIN: CPT

## 2021-02-04 PROCEDURE — 80048 BASIC METABOLIC PNL TOTAL CA: CPT | Performed by: PHYSICIAN ASSISTANT

## 2021-02-04 PROCEDURE — 97110 THERAPEUTIC EXERCISES: CPT

## 2021-02-04 PROCEDURE — 97530 THERAPEUTIC ACTIVITIES: CPT

## 2021-02-04 RX ORDER — PREDNISONE 2.5 MG
2.5 TABLET ORAL DAILY
Qty: 30 TABLET | Refills: 0
Start: 2021-02-04 | End: 2021-02-25 | Stop reason: SDUPTHER

## 2021-02-04 RX ORDER — DABIGATRAN ETEXILATE 75 MG/1
75 CAPSULE, COATED PELLETS ORAL EVERY 12 HOURS SCHEDULED
Qty: 4 CAPSULE | Refills: 0 | Status: SHIPPED | OUTPATIENT
Start: 2021-02-04 | End: 2021-06-03

## 2021-02-04 RX ADMIN — HYDROCHLOROTHIAZIDE 25 MG: 25 TABLET ORAL at 08:58

## 2021-02-04 RX ADMIN — SENNOSIDES 8.6 MG: 8.6 TABLET ORAL at 08:59

## 2021-02-04 RX ADMIN — Medication 1 TABLET: at 08:58

## 2021-02-04 RX ADMIN — ACETAMINOPHEN 650 MG: 325 TABLET ORAL at 11:07

## 2021-02-04 RX ADMIN — PANTOPRAZOLE SODIUM 40 MG: 40 TABLET, DELAYED RELEASE ORAL at 06:21

## 2021-02-04 RX ADMIN — PREDNISONE 15 MG: 5 TABLET ORAL at 08:58

## 2021-02-04 RX ADMIN — FERROUS SULFATE TAB 325 MG (65 MG ELEMENTAL FE) 325 MG: 325 (65 FE) TAB at 07:41

## 2021-02-04 RX ADMIN — ACETAMINOPHEN 650 MG: 325 TABLET ORAL at 05:32

## 2021-02-04 RX ADMIN — METOPROLOL SUCCINATE 12.5 MG: 25 TABLET, EXTENDED RELEASE ORAL at 06:21

## 2021-02-04 RX ADMIN — POTASSIUM CHLORIDE 20 MEQ: 1500 TABLET, EXTENDED RELEASE ORAL at 08:59

## 2021-02-04 RX ADMIN — DABIGATRAN ETEXILATE MESYLATE 75 MG: 75 CAPSULE ORAL at 08:59

## 2021-02-04 RX ADMIN — OXYCODONE HYDROCHLORIDE AND ACETAMINOPHEN 1000 MG: 500 TABLET ORAL at 08:59

## 2021-02-04 RX ADMIN — DOCUSATE SODIUM 100 MG: 100 CAPSULE, LIQUID FILLED ORAL at 08:59

## 2021-02-04 NOTE — PROGRESS NOTES
Orthopedic Total Knee Progress Note  ([facility] - Dr Hilda Pinzon)    ASSESSMENT & PLAN:  Status post- RIGHT total knee arthroplasty:  LOS: 1 day   Doing well postoperatively  Pain Relief: Pt comfortable and pain controlled  Continues current post-op course  Activity: up with assistance  Working with PT / OT  Weight Bearing: WBAT      SUBJECTIVE:    Systemic or Specific Complaints: Pain medications covering pain  OBJECTIVE:  Vital signs in last 24 hours:  Temp:  [96 6 °F (35 9 °C)-100 6 °F (38 1 °C)] 98 6 °F (37 °C)  HR:  [] 100  Resp:  [18-20] 20  BP: (148-164)/(81-98) 148/83  General: alert, appears stated age and cooperative   Neurovascular: Intact    Wound: No Erythema and Wound Intact   Range of Motion: Normal for post surgery   DVT Exam: Negative Tracy's sign  No cords or calf tenderness  No significant calf/ankle edema  Data Review:  CBC:   Lab Results   Component Value Date    WBC 15 51 (H) 02/03/2021    RBC 2 40 (L) 02/03/2021    HGB 9 8 (L) 02/03/2021    HCT 28 9 (L) 02/03/2021     02/03/2021     Plan:  DVT Prophylaxis   Mobilize with PT / OT  D/C Planning for Disposition    Doing Pradaxa at 75 mg x 3 days  Will give him a script (paper) for 4 tablets and then can resume his regular dose  See if it can be filled here please         Tony Dodd PA-C  Date: 2/4/2021  Time: 7:45 AM

## 2021-02-04 NOTE — PROGRESS NOTES
Progress Note - Internal Medicine   Kalani Cabrera 76 y o  male MRN: [id number]  Unit/Bed#: E2 -01 Encounter: [id number]      Impression :    POD #1, elective right total knee replacement by Dr Carina Burgess  Advanced degenerative joint disease, failed conservative treatments right knee  Ambulatory dysfunction  Tucker syndrome  Chronic anticoagulation with dabigatran  History of portal vein thrombosis/  pulmonary embolism  Autoimmune hemolytic anemia/ splenomegaly  Hypertension  Gastroesophageal reflux disease  Vitamin-D deficiency  History of positive COVID 19 (December 2020 resolved)   Obesity with BMI of 30 5  Mood disorder with history of anxiety  Chronic tobacco abuse  Chronic steroid maintenance for autoimmune hemolytic anemia  Hypokalemia, potassium 3 4      Recommendation:    · Patient recuperating well, continue current treatment  · Resume patient's Pradaxa as per orthopedic team   This can be return back to his routine doses once his risk of wound dehiscence bleeding minimizes  · Full fall precautions, use of assist device, local use of ice packs, bilateral José stockings and Venodyne compression boots to minimize risk of DVT  · Encouraged him to use incentive spirometry more frequently  Patient will be given additional potassium is surges regula  · Patient is hemodynamically stable in fact his blood pressure readings yesterday were elevated now has improved  Slowly taper down his prednisone to his regular routine dose of 12 5 mg daily over next 3 days  · Monitor hemoglobin, he has chronic anemia partly due to his underlying autoimmune hemolytic anemia as well as some acute blood loss related to surgery  He follows with his outpatient hematologist Dr Juanito Hua which he should follow  ·  patient had a venous duplex earlier which was reported negative  This was discussed and reviewed with patient    ·  discussed with nursing staff in detail      Subjective:     Patient seen and examined today  EMR and overnight events reviewed  Patient is resting in bed  Not in any distress easily aroused  He had multiple ice packs on his right knee  States that he was having some pain in the back of his calf which he attributes to his position but it has improved  Because of his history of hypercoagulable condition and previous DVTs he was screened for a venous thrombosis and that was reportedly negative  Patient has been doing some ambulation and is hoping that he would be able to go home tomorrow with his wife  His pain is at present 3/10 on numeric pain scale and has been taking pain medications  He has resumed taking his Pradaxa according to his orthopedic physician's recommendation  He has been on slightly elevated dose of prednisone which is tolerating without any difficulty and he fully understands to bring it down to his normal routine dose which he takes for his autoimmune hemolytic anemia  Patient denies any bleeding denies any fever denies any chills denies any abdominal pain  Review of Systems     Constitutional: Denies appetite change  No chills, diaphoresis, fatigue or fever  HEENT: No congestion, sore throat  No visual complaints  Respiratory: No cough, chest tightness, shortness of breath and wheezing  Cardiovascular: No chest pain and palpitations  No Syncope or grey out  GI: Negative for abdominal distention, pain, constipation, diarrhea or nausea  Endocrine: Negative for cold or heat intolerance, polydipsia and polyuria  Genitourinary: Negative for decreased urine volume, dysuria, flank pain and urgency  Skin: Negative for rash  Neurological: Negative for dizziness, weakness, numbness and headaches  Hematological: Negative for spontaneous bruising or bleeding  Psychiatric: Negative for confusion, dysphoric mood, hallucinations  All other systems reviewed and are negative         OBJECTIVE:     Vitals:     Temp:  [97 7 °F (36 5 °C)-101 2 °F (38 4 °C)] 99 1 °F (37 3 °C)  HR:  [] 92  Resp:  [18-20] 18  BP: (129-160)/() 158/94  SpO2:  [94 %-98 %] 95 %  Temp (24hrs), Av 3 °F (37 4 °C), Min:97 7 °F (36 5 °C), Max:101 2 °F (38 4 °C)  Current: Temperature: 99 1 °F (37 3 °C)    Intake/Output Summary (Last 24 hours) at 2/3/2021 1910  Last data filed at 2/3/2021 1402  Gross per 24 hour   Intake 2154 16 ml   Output 825 ml   Net 1329 16 ml     Body mass index is 28 13 kg/m²  Physical Exam    Patient is sleeping in his room supine in his bed  Upon putting the light on with his permission he woke up and was not in any distress  Pallor is 1+ JVP is not elevated  No peripheral edema  Bilateral lungs are clear no rales or crackles  S1-S2 regular no murmurs  Abdomen is soft slight protuberant but no tenderness or guarding  No suprapubic fullness  Bilateral lower extremity Homans sign is negative  Operated right knee has multiple ice packs  They were removed  Has well-approximated surgical incision site anteriorly with Steri-Strips  There is no discharge no drainage  Bilateral Homans sign is negative  Mild tenderness around baldev-incisional area but there is no redness or induration  Distal pulses are intact in posterior tibialis and dorsalis pedis  Mood is fair and neutral     Skin dry and warm        Labs, Imaging, & Other studies:    All pertinent labs and imaging studies were personally reviewed  Results from last 7 days   Lab Units 21  0428   WBC Thousand/uL 15 51*   HEMOGLOBIN g/dL 9 8*   PLATELETS Thousands/uL 370     Results from last 7 days   Lab Units 21  0428   POTASSIUM mmol/L 3 4*   CHLORIDE mmol/L 106   CO2 mmol/L 26   BUN mg/dL 18   CREATININE mg/dL 1 09   EGFR ml/min/1 73sq m 70   CALCIUM mg/dL 8 6            Bilateral lower extremity venous duplex done today this morning-     CONCLUSION:     Impression:  RIGHT LOWER LIMB:  No evidence of acute or chronic deep vein thrombosis  No evidence of superficial thrombophlebitis noted  Doppler evaluation shows a normal response to augmentation maneuvers  Popliteal, posterior tibial and anterior tibial arterial Doppler waveforms are  triphasic  LEFT LOWER LIMB:  No evidence of acute or chronic deep vein thrombosis  No evidence of superficial thrombophlebitis noted  Doppler evaluation shows a normal response to augmentation maneuvers  Popliteal, posterior tibial and anterior tibial arterial Doppler waveforms are  triphasic           Current Meds:  Current Facility-Administered Medications   Medication Dose Route Frequency Provider Last Rate Last Admin    acetaminophen (TYLENOL) tablet 650 mg  650 mg Oral Q6H PRN Michael Araujo PA-C   650 mg at 02/02/21 2012    ascorbic acid (VITAMIN C) tablet 1,000 mg  1,000 mg Oral Daily Bindu Perez MD   1,000 mg at 02/03/21 2551    dabigatran etexilate (PRADAXA) capsule 75 mg  75 mg Oral Q12H [facility] Michael Araujo PA-C   75 mg at 02/03/21 1832    docusate sodium (COLACE) capsule 100 mg  100 mg Oral BID Michael Araujo PA-C   100 mg at 02/03/21 1832    ferrous sulfate tablet 325 mg  325 mg Oral BID With Meals Michael Araujo PA-C   325 mg at 02/03/21 1832    hydrochlorothiazide (HYDRODIURIL) tablet 25 mg  25 mg Oral Daily Bindu Perez MD   25 mg at 02/03/21 5936    HYDROmorphone (DILAUDID) injection 0 5 mg  0 5 mg Intravenous Q2H PRN Michael Araujo PA-C   0 5 mg at 02/03/21 1518    HYDROmorphone (DILAUDID) injection 0 5 mg  0 5 mg Intravenous Q2H PRN Michael Araujo PA-C   0 5 mg at 02/03/21 9349    ketorolac (TORADOL) injection 15 mg  15 mg Intravenous Q6H PRN Michael Araujo PA-C   15 mg at 02/03/21 1252    lactated ringers infusion  125 mL/hr Intravenous Continuous Michael Araujo PA-C 125 mL/hr at 02/03/21 0539 125 mL/hr at 02/03/21 0539    metoprolol succinate (TOPROL-XL) 24 hr tablet 12 5 mg  12 5 mg Oral Daily Bindu Perez MD   12 5 mg at 02/03/21 5445    multivitamin-minerals (CENTRUM) tablet 1 tablet  1 tablet Oral Daily Desirae Oneill PA-C   1 tablet at 02/03/21 0821    ondansetron (ZOFRAN) injection 4 mg  4 mg Intravenous Q8H PRN Desirae Oneill PA-C        oxyCODONE (ROXICODONE) IR tablet 10 mg  10 mg Oral Q4H PRN Desirae Oneill PA-C   10 mg at 02/03/21 1832    oxyCODONE (ROXICODONE) IR tablet 5 mg  5 mg Oral Q4H PRN Desirae Oneill PA-C   5 mg at 02/03/21 1252    pantoprazole (PROTONIX) EC tablet 40 mg  40 mg Oral Daily Before Breakfast Stephon Merino MD   40 mg at 02/03/21 0527    potassium chloride (K-DUR,KLOR-CON) CR tablet 20 mEq  20 mEq Oral Daily Stephon Merino MD   20 mEq at 02/03/21 9631    potassium chloride (K-DUR,KLOR-CON) CR tablet 20 mEq  20 mEq Oral Once Stephon Merino MD        prazosin (MINIPRESS) capsule 1 mg  1 mg Oral HS Stephon Merino MD        predniSONE tablet 15 mg  15 mg Oral Daily Stephon Merino MD   15 mg at 02/03/21 1913    senna (SENOKOT) tablet 8 6 mg  1 tablet Oral Daily Desirae Oneill PA-C   8 6 mg at 02/03/21 1399    simethicone (MYLICON) chewable tablet 80 mg  80 mg Oral 4x Daily PRN Desirae Oneill PA-C         Home Meds:  Medications Prior to Admission   Medication    ascorbic acid (VITAMIN C) 1000 MG tablet    dabigatran etexilate (Pradaxa) 150 mg capsu    hydrochlorothiazide (HYDRODIURIL) 25 mg tablet    metoprolol succinate (TOPROL-XL) 25 mg 24 hr tablet    NON FORMULARY    pantoprazole (PROTONIX) 40 mg tablet    potassium chloride (K-DUR,KLOR-CON) 20 mEq tablet    prazosin (MINIPRESS) 1 mg capsule    predniSONE 2 5 mg tablet    VITAMIN D PO    VITAMIN E PO    zinc sulfate (ZINCATE) 220 mg capsule    chlorhexidine (Hibiclens) 4 % external liquid    predniSONE 10 mg tablet       Continuous Infusions:  lactated ringers, 125 mL/hr, Last Rate: 125 mL/hr (02/03/21 0539)        Invasive Devices     Peripheral Intravenous Line            Peripheral IV 02/02/21 Left Hand 1 day                VTE Pharmacologic Prophylaxis: Pradaxa as per Primary Ortho Team   VTE Mechanical Prophylaxis: sequential compression device    Portions of the record may have been created with voice recognition software  Occasional wrong word or "sound a like" substitutions may have occurred due to the inherent limitations of voice recognition software  Read the chart carefully and recognize, using context, where substitutions have occurred

## 2021-02-04 NOTE — PROGRESS NOTES
Progress Note - Internal Medicine   Bianka Query 76 y o  male MRN: [id number]  Unit/Bed#: E2 -01 Encounter: [id number]      Impression :    POD # 2, elective right total knee replacement by Dr Sergio Harris     Advanced degenerative joint disease, failed conservative treatments right knee     Ambulatory dysfunction     Tucker syndrome  Chronic anticoagulation with dabigatran     History of portal vein thrombosis/  pulmonary embolism  Autoimmune hemolytic anemia/ splenomegaly  Hypertension     Gastroesophageal reflux disease     Vitamin-D deficiency  History of positive COVID 19 (December 2020 resolved)   Mood disorder with history of anxiety- currently stable  Chronic tobacco abuse  Chronic steroid maintenance for autoimmune hemolytic anemia  Hypokalemia, Potassium 3 5 -likely due to use of thiazide diuretics      Recommendation:    Patient recuperating well following surgery  Medically at baseline and can be discharged  Continue potassium supplements potassium is slightly better from yesterday he is already on 20 mEq is of K-Dur  Recommend increasing his potassium rich diet  Likely due to use of thiazide diuretics and will need to be watched  Patient to follow-up with his outpatient Hematology, resume his Pradaxa once Orthopedic permits him to take his full dose  Full fall precautions, use of incentive spirometry reviewed  Monitor his blood pressure considering he takes beta-blockers and thiazide diuretics  Reviewed his lab work vitals in detail  Discussed with orthopedic team   If cleared by PT and OT he can be return home  Patient is looking forward for his wife to come later today and pick him up  He will continue with home therapy  Subjective:     Patient seen and examined today  EMR and overnight events reviewed  Patient resting comfortably states that he is doing much better  Has been using ice packs and taking pain medications to elevate his symptoms    Denies any bleeding from his surgical site  He feels that his swelling has gone down substantially today  Denies any cramping in his legs  Mood is fair and neutral   He is happy that he will be going home later today after he does another session of therapy  He denies any difficulty in breathing denies any abdominal pain no nausea no vomiting currently asymptomatic except for dull pain around the surgical site which he rates 2/10 on numeric pain scale  Review of Systems     Constitutional: Denies appetite change  No chills, diaphoresis, fatigue or fever  HEENT: No congestion, sore throat  No visual complaints  Respiratory: No cough, chest tightness, shortness of breath and wheezing  Cardiovascular: No chest pain and palpitations  No Syncope or grey out  GI: Negative for abdominal distention, pain, constipation, diarrhea or nausea  Endocrine: Negative for cold or heat intolerance, polydipsia and polyuria  Genitourinary: Negative for decreased urine volume, dysuria, flank pain and urgency  Skin: Negative for rash  Neurological: Negative for dizziness, weakness, numbness and headaches  Hematological: Negative for spontaneous bruising or bleeding  Psychiatric: Negative for confusion, dysphoric mood, hallucinations  All other systems reviewed and are negative  OBJECTIVE:     Vitals:     Temp:  [96 6 °F (35 9 °C)-100 6 °F (38 1 °C)] 98 6 °F (37 °C)  HR:  [] 100  Resp:  [18-20] 20  BP: (148-164)/(81-98) 148/83  SpO2:  [95 %-99 %] 98 %  Temp (24hrs), Av 4 °F (36 9 °C), Min:96 6 °F (35 9 °C), Max:100 6 °F (38 1 °C)  Current: Temperature: 98 6 °F (37 °C)    Intake/Output Summary (Last 24 hours) at 2021 0753  Last data filed at 2021 0300  Gross per 24 hour   Intake --   Output 875 ml   Net -875 ml     Body mass index is 28 13 kg/m²  Physical Exam    General Appearance:  Awake,alert, cooperative  Not in distress  Pallor / Icterus/ Cyanosis negative  Oropharynx no thrush     Tongue protrudes in midline and is moist   Head:  Normocephalic, atraumatic  No titubations  Eyes:  No eye redness or discharge bilaterally  Neck: Supple, no raised JVP  Lungs:   B/L Clear to auscultation, no wheezing or rhonchi  Normal broncho-alveolar air entry  No pleural rubs  Heart:   Regular S1S2, A2P2 normal, no murmur  Abdomen:   Soft, non-tender,no rebound, bowel sounds+  Musculoskeletal: Extremities no cyanosis or edema  Surgical site on right knee has clean dressing  No discharge or drainage  Mild diminution and limited range of motion   Psych: Normal Affect / No hallucinations or delusions  Neurologic:           Skin:  Endocrine:  Vascular: Awake and alert  Mentation intact  Speech is intact  Facial symmetry is maintained  Muscle strength is 5/5 in all muscle groups except on operated right knee joint area is limited due to surgery  Light touch and temperature sensation is maintained  No rashes /purpura  No open wounds  No thyromegaly / no lid lag  Homans sign is negative  B/L Calf are supple and non tender         Labs, Imaging, & Other studies:    All pertinent labs and imaging studies were personally reviewed  Results from last 7 days   Lab Units 02/03/21  0428   WBC Thousand/uL 15 51*   HEMOGLOBIN g/dL 9 8*   PLATELETS Thousands/uL 370     Results from last 7 days   Lab Units 02/04/21  0431   POTASSIUM mmol/L 3 5   CHLORIDE mmol/L 103   CO2 mmol/L 26   BUN mg/dL 22   CREATININE mg/dL 1 14   EGFR ml/min/1 73sq m 67   CALCIUM mg/dL 9 4           Current Meds:  Current Facility-Administered Medications   Medication Dose Route Frequency Provider Last Rate Last Admin    acetaminophen (TYLENOL) tablet 650 mg  650 mg Oral Q6H PRN Verito Anderson PA-C   650 mg at 02/04/21 0532    ascorbic acid (VITAMIN C) tablet 1,000 mg  1,000 mg Oral Daily Nicole Santizo MD   1,000 mg at 02/03/21 4285    dabigatran etexilate (PRADAXA) capsule 75 mg  75 mg Oral Q12H [address] Estee Mercado PA-C   75 mg at 02/03/21 1832    docusate sodium (COLACE) capsule 100 mg  100 mg Oral BID Mikayla Babatunde, PA-C   100 mg at 02/03/21 1832    ferrous sulfate tablet 325 mg  325 mg Oral BID With Meals Mikayla Babatunde, PA-C   325 mg at 02/04/21 0741    hydrochlorothiazide (HYDRODIURIL) tablet 25 mg  25 mg Oral Daily Shania Soler MD   25 mg at 02/03/21 7268    HYDROmorphone (DILAUDID) injection 0 5 mg  0 5 mg Intravenous Q2H PRN Mikayla Sargent, PA-C   0 5 mg at 02/03/21 2036    HYDROmorphone (DILAUDID) injection 0 5 mg  0 5 mg Intravenous Q2H PRN Mikayla Sargent, PA-C   0 5 mg at 02/03/21 4587    ketorolac (TORADOL) injection 15 mg  15 mg Intravenous Q6H PRN Mikayla Babatunde, PA-C   15 mg at 02/03/21 1252    lactated ringers infusion  125 mL/hr Intravenous Continuous Mikayla Babatunde, PA-C 125 mL/hr at 02/03/21 0539 125 mL/hr at 02/03/21 0539    metoprolol succinate (TOPROL-XL) 24 hr tablet 12 5 mg  12 5 mg Oral Daily Shania Soler MD   12 5 mg at 02/04/21 9402    multivitamin-minerals (CENTRUM) tablet 1 tablet  1 tablet Oral Daily Mikayla Babatunde, PA-C   1 tablet at 02/03/21 2280    ondansetron (ZOFRAN) injection 4 mg  4 mg Intravenous Q8H PRN Mikayla Sargent, PA-C        oxyCODONE (ROXICODONE) IR tablet 10 mg  10 mg Oral Q4H PRN Mikayla Sargent, PA-C   10 mg at 02/03/21 1832    oxyCODONE (ROXICODONE) IR tablet 5 mg  5 mg Oral Q4H PRN Mikayla Sargent, PA-C   5 mg at 02/03/21 1252    pantoprazole (PROTONIX) EC tablet 40 mg  40 mg Oral Daily Before Breakfast Shania Soler MD   40 mg at 02/04/21 7936    potassium chloride (K-DUR,KLOR-CON) CR tablet 20 mEq  20 mEq Oral Daily Shania Soler MD   20 mEq at 02/03/21 8959    prazosin (MINIPRESS) capsule 1 mg  1 mg Oral HS Shania Soler MD        predniSONE tablet 15 mg  15 mg Oral Daily Shania Soler MD   15 mg at 02/03/21 0201    senna (SENOKOT) tablet 8 6 mg  1 tablet Oral Daily Mikayla Fine PA-C   8 6 mg at 02/03/21 0851    simethicone (MYLICON) chewable tablet 80 mg  80 mg Oral 4x Daily PRN Caryle Bible, PA-C         Home Meds:  Medications Prior to Admission   Medication    ascorbic acid (VITAMIN C) 1000 MG tablet    dabigatran etexilate (Pradaxa) 150 mg capsu    hydrochlorothiazide (HYDRODIURIL) 25 mg tablet    metoprolol succinate (TOPROL-XL) 25 mg 24 hr tablet    NON FORMULARY    pantoprazole (PROTONIX) 40 mg tablet    potassium chloride (K-DUR,KLOR-CON) 20 mEq tablet    prazosin (MINIPRESS) 1 mg capsule    predniSONE 2 5 mg tablet    VITAMIN D PO    VITAMIN E PO    zinc sulfate (ZINCATE) 220 mg capsule    chlorhexidine (Hibiclens) 4 % external liquid    predniSONE 10 mg tablet       Continuous Infusions:  lactated ringers, 125 mL/hr, Last Rate: 125 mL/hr (02/03/21 0539)        Invasive Devices     Peripheral Intravenous Line            Peripheral IV 02/02/21 Left Hand 2 days                VTE Pharmacologic Prophylaxis: pradaxa as per Primary Ortho Team   VTE Mechanical Prophylaxis: sequential compression device    Portions of the record may have been created with voice recognition software  Occasional wrong word or "sound a like" substitutions may have occurred due to the inherent limitations of voice recognition software  Read the chart carefully and recognize, using context, where substitutions have occurred

## 2021-02-04 NOTE — PLAN OF CARE
Problem: PAIN - ADULT  Goal: Verbalizes/displays adequate comfort level or baseline comfort level  Description: Interventions:  - Encourage patient to monitor pain and request assistance  - Assess pain using appropriate pain scale  - Administer analgesics based on type and severity of pain and evaluate response  - Implement non-pharmacological measures as appropriate and evaluate response  - Consider cultural and social influences on pain and pain management  - Notify physician/advanced practitioner if interventions unsuccessful or patient reports new pain  Outcome: Progressing     Problem: INFECTION - ADULT  Goal: Absence or prevention of progression during hospitalization  Description: INTERVENTIONS:  - Assess and monitor for signs and symptoms of infection  - Monitor lab/diagnostic results  - Monitor all insertion sites, i e  indwelling lines, tubes, and drains  - Monitor endotracheal if appropriate and nasal secretions for changes in amount and color  - [place] appropriate cooling/warming therapies per order  - Administer medications as ordered  - Instruct and encourage patient and family to use good hand hygiene technique  - Identify and instruct in appropriate isolation precautions for identified infection/condition  Outcome: Progressing     Problem: SAFETY ADULT  Goal: Patient will remain free of falls  Description: INTERVENTIONS:  - Assess patient frequently for physical needs  -  Identify cognitive and physical deficits and behaviors that affect risk of falls    -  [place] fall precautions as indicated by assessment   - Educate patient/family on patient safety including physical limitations  - Instruct patient to call for assistance with activity based on assessment  - Modify environment to reduce risk of injury  - Consider OT/PT consult to assist with strengthening/mobility  Outcome: Progressing  Goal: Maintain or return to baseline ADL function  Description: INTERVENTIONS:  -  Assess patient's ability to carry out ADLs; assess patient's baseline for ADL function and identify physical deficits which impact ability to perform ADLs (bathing, care of mouth/teeth, toileting, grooming, dressing, etc )  - Assess/evaluate cause of self-care deficits   - Assess range of motion  - Assess patient's mobility; develop plan if impaired  - Assess patient's need for assistive devices and provide as appropriate  - Encourage maximum independence but intervene and supervise when necessary  - Involve family in performance of ADLs  - Assess for home care needs following discharge   - Consider OT consult to assist with ADL evaluation and planning for discharge  - Provide patient education as appropriate  Outcome: Progressing  Goal: Maintain or return mobility status to optimal level  Description: INTERVENTIONS:  - Assess patient's baseline mobility status (ambulation, transfers, stairs, etc )    - Identify cognitive and physical deficits and behaviors that affect mobility  - Identify mobility aids required to assist with transfers and/or ambulation (gait belt, sit-to-stand, lift, walker, cane, etc )  - [place] fall precautions as indicated by assessment  - Record patient progress and toleration of activity level on Mobility SBAR; progress patient to next Phase/Stage  - Instruct patient to call for assistance with activity based on assessment  - Consider rehabilitation consult to assist with strengthening/weightbearing, etc   Outcome: Progressing     Problem: DISCHARGE PLANNING  Goal: Discharge to home or other facility with appropriate resources  Description: INTERVENTIONS:  - Identify barriers to discharge w/patient and caregiver  - Arrange for needed discharge resources and transportation as appropriate  - Identify discharge learning needs (meds, wound care, etc )  - Arrange for interpretive services to assist at discharge as needed  - Refer to Case Management Department for coordinating discharge planning if the patient needs post-hospital services based on physician/advanced practitioner order or complex needs related to functional status, cognitive ability, or social support system  Outcome: Progressing     Problem: Knowledge Deficit  Goal: Patient/family/caregiver demonstrates understanding of disease process, treatment plan, medications, and discharge instructions  Description: Complete learning assessment and assess knowledge base    Interventions:  - Provide teaching at level of understanding  - Provide teaching via preferred learning methods  Outcome: Progressing     Problem: SKIN/TISSUE INTEGRITY - ADULT  Goal: Skin integrity remains intact  Description: INTERVENTIONS  - Identify patients at risk for skin breakdown  - Assess and monitor skin integrity  - Assess and monitor nutrition and hydration status  - Monitor labs (i e  albumin)  - Assess for incontinence   - Turn and reposition patient  - Assist with mobility/ambulation  - Relieve pressure over bony prominences  - Avoid friction and shearing  - Provide appropriate hygiene as needed including keeping skin clean and dry  - Evaluate need for skin moisturizer/barrier cream  - Collaborate with interdisciplinary team (i e  Nutrition, Rehabilitation, etc )   - Patient/family teaching  Outcome: Progressing  Goal: Incision(s), wounds(s) or drain site(s) healing without S/S of infection  Description: INTERVENTIONS  - Assess and document risk factors for skin impairment   - Assess and document dressing, incision, wound bed, drain sites and surrounding tissue  - Consider nutrition services referral as needed  - Oral mucous membranes remain intact  - Provide patient/ family education  Outcome: Progressing     Problem: MUSCULOSKELETAL - ADULT  Goal: Maintain or return mobility to safest level of function  Description: INTERVENTIONS:  - Assess patient's ability to carry out ADLs; assess patient's baseline for ADL function and identify physical deficits which impact ability to perform ADLs (bathing, care of mouth/teeth, toileting, grooming, dressing, etc )  - Assess/evaluate cause of self-care deficits   - Assess range of motion  - Assess patient's mobility  - Assess patient's need for assistive devices and provide as appropriate  - Encourage maximum independence but intervene and supervise when necessary  - Involve family in performance of ADLs  - Assess for home care needs following discharge   - Consider OT consult to assist with ADL evaluation and planning for discharge  - Provide patient education as appropriate  Outcome: Progressing  Goal: Maintain proper alignment of affected body part  Description: INTERVENTIONS:  - Support, maintain and protect limb and body alignment  - Provide patient/ family with appropriate education  Outcome: Progressing     Problem: Potential for Falls  Goal: Patient will remain free of falls  Description: INTERVENTIONS:  - Assess patient frequently for physical needs  -  Identify cognitive and physical deficits and behaviors that affect risk of falls    -  [place] fall precautions as indicated by assessment   - Educate patient/family on patient safety including physical limitations  - Instruct patient to call for assistance with activity based on assessment  - Modify environment to reduce risk of injury  - Consider OT/PT consult to assist with strengthening/mobility  Outcome: Progressing

## 2021-02-04 NOTE — OCCUPATIONAL THERAPY NOTE
Occupational Therapy Evaluation(time=0910-0925)     Patient Name: Castillo HUDSON Date: 2/4/2021  Problem List  Principal Problem:    Primary localized osteoarthrosis of the knee, right    Past Medical History  Past Medical History:   Diagnosis Date    Anxiety     Arthritis     Autoimmune hemolytic anemia     Claustrophobia     DVT (deep venous thrombosis) (HCC)     GERD (gastroesophageal reflux disease)     Hearing loss, right     Hemolytic anemia ([address] )     History of transfusion     2018 - no adverse reaction    Hypertension     Palpitation     Portal vein thrombosis     PTSD (post-traumatic stress disorder)     Pulmonary emboli (HCC)     Tobacco abuse      Past Surgical History  Past Surgical History:   Procedure Laterality Date    COLONOSCOPY      ELBOW ARTHROPLASTY Left     bursectomy    KNEE SURGERY Right     meniscus tear    AK LAP,CHOLECYSTECTOMY/GRAPH N/A 12/23/2017    Procedure: CHOLECYSTECTOMY LAPAROSCOPIC with cholangiogram;  Surgeon: Naet Shepard MD;  Location: [facility];  Service: General    AK REMOVAL SPLEEN, TOTAL N/A 5/18/2017    Procedure: LAPAROSCOPIC HAND ASSIST SPLENECTOMY;  Surgeon: Paolo Jenkins MD;  [facility]: [facility];  Service: Surgical Oncology    AK TOTAL KNEE ARTHROPLASTY Right 2/2/2021    Procedure: ARTHROPLASTY KNEE TOTAL;  Surgeon: Tawana Zhou MD;  Location: [facility];  Service: Orthopedics    SHOULDER SURGERY Left     rotator cuff x4, reconstruction        02/04/21 0992   Note Type   Note type Evaluation   Restrictions/Precautions   Weight Bearing Precautions Per Order Yes   RLE Weight Bearing Per Order WBAT   Other Precautions Fall Risk;Pain   Pain Assessment   Pain Assessment Tool 0-10   Pain Score 4   Pain Location/Orientation Orientation: Right;Location: Knee   Home Living   Type of Home House   Home Layout Two level   Bathroom Equipment Grab bars in [address]   Prior Function   Lives With [address]  Independent   Falls in the last 6 months 0   Lifestyle   Autonomy PTA pt states independence with all aspects of his ADLs, transfers, ambulation--w/o device   Reciprocal Relationships supportive wife   Service to Others none stated   Intrinsic Gratification golfing   Psychosocial   Psychosocial (WDL) WDL   Subjective   Subjective "I'm looking forward to getting back to golfing "   ADL   Where Assessed Edge of bed   Eating Assistance 6  Modified independent   Grooming Assistance 6  Modified Independent   UB Bathing Assistance 5  Supervision/Setup   LB Bathing Assistance 5  Supervision/Setup   UB Dressing Assistance 5  Supervision/Setup   LB Dressing Assistance 5  Supervision/Setup   Bed Mobility   Rolling R 5  Supervision   Rolling L 5  Supervision   Supine to Sit 4  Minimal assistance   Additional items Assist x 1; Increased time required;Verbal cues;LE management   Transfers   Sit to Stand 5  Supervision   Additional items Increased time required;Verbal cues   Stand to Sit 5  Supervision   Additional items Assist x 1; Increased time required   Functional Mobility   Functional Mobility 5  Supervision   Additional items Rolling walker   Balance   Static Sitting Good   Dynamic Sitting Fair +   Static Standing Fair   Dynamic Standing Fair -   Activity Tolerance   Activity Tolerance Patient limited by fatigue;Patient limited by pain   Medical Staff Made Aware nsg, P T  CM   RUE Assessment   RUE Assessment WFL   RUE Strength   RUE Overall Strength Within Functional Limits - strength 5/5   LUE Assessment   LUE Assessment WFL   LUE Strength   LUE Overall Strength Within Functional Limits - strength 5/5   Hand Function   Gross Motor Coordination Functional   Fine Motor Coordination Functional   Sensation   Light Touch No apparent deficits   Proprioception   Proprioception No apparent deficits   Vision-Basic Assessment   Current Vision Wears glasses only for reading   Vision - Complex Assessment   Acuity Able to read clock/calendar on wall without difficulty   Perception   Inattention/Neglect Appears intact   Cognition   Overall Cognitive Status WFL   Arousal/Participation Alert   Attention Within functional limits   Orientation Level Oriented X4   Memory Within functional limits   Following Commands Follows all commands and directions without difficulty   Assessment   Limitation Decreased endurance   Prognosis Good   Assessment Pt is a 64y/o male admitted to the hospital for an elected s/p R TKR(2/2) 2* hx OA  Pt with PMH anemia, PTSD, L shr sx, R knee sx, DVT, and PE  PTA pt states independence with all aspects of his ADLs, transfers, ambulation--w/o device  During initial eval, pt demonstrated slight deficits with his functional balance, functional mobility, and activity tolerance  Pt was able to demonstrate good ADL status and states no concerns about going directly home  Pt would benefit from continued P T  to improve his balance, mobility, and overall endurance  Acute OT tx not indicated at this time 2* limited ADL deficits      Goals   Patient Goals "to get back to golfing"   Plan   OT Frequency Eval only   Recommendation   OT Discharge Recommendation   (continue P T  )   Equipment Recommended Bedside commode  (RW)   OT - OK to Discharge Yes   [place] Index   Feeding 10   Bathing 5   Grooming Score 5   Dressing Score 10   Bladder Score 10   Bowels Score 10   Toilet Use Score 10   Transfers (Bed/Chair) Score 10   Mobility (Level Surface) Score 10   Stairs Score 0   B[place]hel Index Score 80   Linda Allison OT

## 2021-02-04 NOTE — TELEPHONE ENCOUNTER
Left message for patient to verify dosage of prednisone  Prescription on 2/1/21 noted to be [id number] mg daily  Awaiting return call

## 2021-02-04 NOTE — PLAN OF CARE
Problem: PAIN - ADULT  Goal: Verbalizes/displays adequate comfort level or baseline comfort level  Description: Interventions:  - Encourage patient to monitor pain and request assistance  - Assess pain using appropriate pain scale  - Administer analgesics based on type and severity of pain and evaluate response  - Implement non-pharmacological measures as appropriate and evaluate response  - Consider cultural and social influences on pain and pain management  - Notify physician/advanced practitioner if interventions unsuccessful or patient reports new pain  Outcome: Progressing     Problem: INFECTION - ADULT  Goal: Absence or prevention of progression during hospitalization  Description: INTERVENTIONS:  - Assess and monitor for signs and symptoms of infection  - Monitor lab/diagnostic results  - Monitor all insertion sites, i e  indwelling lines, tubes, and drains  - Monitor endotracheal if appropriate and nasal secretions for changes in amount and color  - [place] appropriate cooling/warming therapies per order  - Administer medications as ordered  - Instruct and encourage patient and family to use good hand hygiene technique  - Identify and instruct in appropriate isolation precautions for identified infection/condition  Outcome: Progressing     Problem: SAFETY ADULT  Goal: Patient will remain free of falls  Description: INTERVENTIONS:  - Assess patient frequently for physical needs  -  Identify cognitive and physical deficits and behaviors that affect risk of falls    -  [place] fall precautions as indicated by assessment   - Educate patient/family on patient safety including physical limitations  - Instruct patient to call for assistance with activity based on assessment  - Modify environment to reduce risk of injury  - Consider OT/PT consult to assist with strengthening/mobility  Outcome: Progressing  Goal: Maintain or return to baseline ADL function  Description: INTERVENTIONS:  -  Assess patient's ability to carry out ADLs; assess patient's baseline for ADL function and identify physical deficits which impact ability to perform ADLs (bathing, care of mouth/teeth, toileting, grooming, dressing, etc )  - Assess/evaluate cause of self-care deficits   - Assess range of motion  - Assess patient's mobility; develop plan if impaired  - Assess patient's need for assistive devices and provide as appropriate  - Encourage maximum independence but intervene and supervise when necessary  - Involve family in performance of ADLs  - Assess for home care needs following discharge   - Consider OT consult to assist with ADL evaluation and planning for discharge  - Provide patient education as appropriate  Outcome: Progressing  Goal: Maintain or return mobility status to optimal level  Description: INTERVENTIONS:  - Assess patient's baseline mobility status (ambulation, transfers, stairs, etc )    - Identify cognitive and physical deficits and behaviors that affect mobility  - Identify mobility aids required to assist with transfers and/or ambulation (gait belt, sit-to-stand, lift, walker, cane, etc )  - [place] fall precautions as indicated by assessment  - Record patient progress and toleration of activity level on Mobility SBAR; progress patient to next Phase/Stage  - Instruct patient to call for assistance with activity based on assessment  - Consider rehabilitation consult to assist with strengthening/weightbearing, etc   Outcome: Progressing     Problem: DISCHARGE PLANNING  Goal: Discharge to home or other facility with appropriate resources  Description: INTERVENTIONS:  - Identify barriers to discharge w/patient and caregiver  - Arrange for needed discharge resources and transportation as appropriate  - Identify discharge learning needs (meds, wound care, etc )  - Arrange for interpretive services to assist at discharge as needed  - Refer to Case Management Department for coordinating discharge planning if the patient needs post-hospital services based on physician/advanced practitioner order or complex needs related to functional status, cognitive ability, or social support system  Outcome: Progressing     Problem: Knowledge Deficit  Goal: Patient/family/caregiver demonstrates understanding of disease process, treatment plan, medications, and discharge instructions  Description: Complete learning assessment and assess knowledge base    Interventions:  - Provide teaching at level of understanding  - Provide teaching via preferred learning methods  Outcome: Progressing     Problem: SKIN/TISSUE INTEGRITY - ADULT  Goal: Skin integrity remains intact  Description: INTERVENTIONS  - Identify patients at risk for skin breakdown  - Assess and monitor skin integrity  - Assess and monitor nutrition and hydration status  - Monitor labs (i e  albumin)  - Assess for incontinence   - Turn and reposition patient  - Assist with mobility/ambulation  - Relieve pressure over bony prominences  - Avoid friction and shearing  - Provide appropriate hygiene as needed including keeping skin clean and dry  - Evaluate need for skin moisturizer/barrier cream  - Collaborate with interdisciplinary team (i e  Nutrition, Rehabilitation, etc )   - Patient/family teaching  Outcome: Progressing  Goal: Incision(s), wounds(s) or drain site(s) healing without S/S of infection  Description: INTERVENTIONS  - Assess and document risk factors for skin impairment   - Assess and document dressing, incision, wound bed, drain sites and surrounding tissue  - Consider nutrition services referral as needed  - Oral mucous membranes remain intact  - Provide patient/ family education  Outcome: Progressing     Problem: MUSCULOSKELETAL - ADULT  Goal: Maintain or return mobility to safest level of function  Description: INTERVENTIONS:  - Assess patient's ability to carry out ADLs; assess patient's baseline for ADL function and identify physical deficits which impact ability to perform ADLs (bathing, care of mouth/teeth, toileting, grooming, dressing, etc )  - Assess/evaluate cause of self-care deficits   - Assess range of motion  - Assess patient's mobility  - Assess patient's need for assistive devices and provide as appropriate  - Encourage maximum independence but intervene and supervise when necessary  - Involve family in performance of ADLs  - Assess for home care needs following discharge   - Consider OT consult to assist with ADL evaluation and planning for discharge  - Provide patient education as appropriate  Outcome: Progressing  Goal: Maintain proper alignment of affected body part  Description: INTERVENTIONS:  - Support, maintain and protect limb and body alignment  - Provide patient/ family with appropriate education  Outcome: Progressing     Problem: Potential for Falls  Goal: Patient will remain free of falls  Description: INTERVENTIONS:  - Assess patient frequently for physical needs  -  Identify cognitive and physical deficits and behaviors that affect risk of falls    -  [place] fall precautions as indicated by assessment   - Educate patient/family on patient safety including physical limitations  - Instruct patient to call for assistance with activity based on assessment  - Modify environment to reduce risk of injury  - Consider OT/PT consult to assist with strengthening/mobility  Outcome: Progressing

## 2021-02-04 NOTE — PLAN OF CARE
Problem: PHYSICAL THERAPY ADULT  Goal: Performs mobility at highest level of function for planned discharge setting  See evaluation for individualized goals  Description: Treatment/Interventions: Functional transfer training, LE strengthening/ROM, Elevations, Therapeutic exercise, Endurance training, Patient/family training, Equipment eval/education, Bed mobility, Gait training, Spoke to nursing(progress to crutches  )  Equipment Recommended: (tbd)       See flowsheet documentation for full assessment, interventions and recommendations  Outcome: Progressing  Note: Prognosis: Fair  Problem List: Decreased strength, Decreased range of motion, Decreased endurance, Impaired balance, Decreased mobility, Obesity, Decreased skin integrity, Orthopedic restrictions, Pain  Assessment: Pt  seated on chair with OTR present upon my arrival  Pt  eager to participate in therapy this AM, in hopes of going home today  Progressed with transfers being able to complete practicing proper technique with no noted LOB  Progressed with an increased amb  trial with use of RW and cues provided for LE sequencing  Pt  progressed to stair training, being able to complete practicing proper technique with no noted LOB  Pt  reports good understanding at this time with no questions asked  Pt  continued with an increased amb  trial with eventual return to room  Pt  repositioned supine in bed with ice applied at end of treatment session  Pt  has completed his PT goals and is safe for d/c home with HHPT and family support as needed when medically stable  Barriers to Discharge: None  Barriers to Discharge Comments: SIDRA  PT Discharge Recommendation: Home with skilled therapy, Return to previous environment with social support(HHPT)     PT - OK to Discharge: Yes(if d/c when medically stable )    See flowsheet documentation for full assessment

## 2021-02-04 NOTE — TELEPHONE ENCOUNTER
Patient reports that his total dose of Prednisone is 12 5 mg ( one-10 mg tablet and one-2 5 mg tablet)  Will pend a prescription to Dr Arpita Hill for 2 5 mg Prednisone

## 2021-02-04 NOTE — PHYSICAL THERAPY NOTE
Physical Therapy Progress Note     02/04/21 0953   Note Type   Note Type Treatment   Pain Assessment   Pain Assessment Tool 0-10   Pain Score 5   Pain Location/Orientation Orientation: Right;Location: Knee   Hospital Pain Intervention(s) Ambulation/increased activity;Repositioned;Cold applied   Restrictions/Precautions   Weight Bearing Precautions Per Order Yes   RLE Weight Bearing Per Order WBAT   Other Precautions Fall Risk;Pain   General   Chart Reviewed Yes   Response to Previous Treatment Patient with no complaints from previous session  Family/Caregiver Present No   Subjective   Subjective Willing to participate in therapy this AM    Bed Mobility   Sit to Supine 5  Supervision   Additional items Assist x 1;Bedrails;Leg ; Increased time required;Verbal cues;LE management   Transfers   Sit to Stand 5  Supervision   Additional items Assist x 1; Armrests; Bedrails; Increased time required;Verbal cues   Stand to Sit 5  Supervision   Additional items Assist x 1;Bedrails;Armrests; Increased time required;Verbal cues   Ambulation/Elevation   Gait pattern Decreased foot clearance; Forward Flexion; Improper Weight shift; Antalgic;Decreased R stance; Short stride; Step to;Excessively slow; Inconsistent svetlana   Gait Assistance 5  Supervision   Additional items Assist x 1;Verbal cues; Tactile cues   Assistive Device Rolling walker   Distance 200' with stair training performed   Stair Management Assistance 5  Supervision   Additional items Assist x 1;Verbal cues; Tactile cues   Stair Management Technique Two rails; Step to pattern; Foreward;Nonreciprocal   Number of Stairs 10   Balance   Static Sitting Good   Dynamic Sitting Fair   Static Standing Fair   Dynamic Standing Fair -   Ambulatory Fair -   Endurance Deficit   Endurance Deficit No   Activity Tolerance   Activity Tolerance Patient tolerated treatment well   Nurse Made Aware Yes   Exercises   TKR Supine;Sitting;10 reps;AAROM; Bilateral   Assessment   Prognosis Fair Problem List Decreased strength;Decreased range of motion;Decreased endurance; Impaired balance;Decreased mobility;Obesity; Decreased skin integrity;Orthopedic restrictions;Pain   Assessment Pt  seated on chair with OTR present upon my arrival  Pt  eager to participate in therapy this AM, in hopes of going home today  Progressed with transfers being able to complete practicing proper technique with no noted LOB  Progressed with an increased amb  trial with use of RW and cues provided for LE sequencing  Pt  progressed to stair training, being able to complete practicing proper technique with no noted LOB  Pt  reports good understanding at this time with no questions asked  Pt  continued with an increased amb  trial with eventual return to room  Pt  repositioned supine in bed with ice applied at end of treatment session  Pt  has completed his PT goals and is safe for d/c home with HHPT and family support as needed when medically stable  Barriers to Discharge None   Goals   Patient Goals To go home today  STG Expiration Date 02/06/21   PT Treatment Day 3   Plan   Treatment/Interventions Functional transfer training;LE strengthening/ROM; Elevations; Therapeutic exercise; Endurance training;Bed mobility;Gait training;Spoke to nursing;Spoke to case management;OT   Progress Improving as expected   PT Frequency 7x/wk; Twice a day;Weekend   Recommendation   PT Discharge Recommendation Home with skilled therapy; Return to previous environment with social support  ([address])   Equipment Recommended Filomena Aguilar; Other (Comment)  (RW, BSC)   PT - OK to Discharge Yes  (if d/c when medically stable )     Swati Luna PTA

## 2021-02-04 NOTE — DISCHARGE SUMMARY
DISCHARGE SUMMARY      Patient Name: Bianka Samaniego  Patient MRN: [id number]  Admitting Provider: Naga Michele MD  Discharging Provider: No att  providers found  Primary Care Physician at Discharge: Kit Marie, [address] [place] [address]  Admission Date: 2/2/2021       Discharge Date: 2/4/2021    Admission Diagnosis  Primary osteoarthritis of right knee [M17 11]    Discharge Diagnoses  Principal Problem:    Primary localized osteoarthrosis of the knee, right  Resolved Problems:    * No resolved hospital problems  [id number] [address] Jeet was admitted to [facility] on 2/2/2021, with diagnosis of osteoarthritis in his Rightknee  On the day of admission, he was brought to surgery, successfully underwent a Right knee replacement  He tolerated the procedure well  Following surgery, he was taken to the recovery room, at which time, there was a consult placed for Dr Bull Parker for the patient's medical management  After a short stay in the recovery room, he was transferred to the orthopedic floor at which time, he was resumed on his routine home medications per the hospitalist group  On postop day #1, he was able to start some physical therapy and was able to tolerate it well  At this time, he was in stable condition, showing no sign of deep vein thrombosis or pulmonary embolism  Incision was healing well at the time and showed no signs of infection  DISCHARGE DIAGNOSIS: Primary osteoarthritis of right knee [M17 11]  He is now status post Right total knee replacement, which he underwent during the hospital stay  Medications  See after visit summary for reconciled discharge medications provided to patient and family        Allergies  Allergies   Allergen Reactions    Iodinated Diagnostic Agents Hives     Unsure if this is still an allergy       Outpatient Follow-Up  Future Appointments   Date Time Provider Silva Hsu   6/1/2021  9:00 AM DO PATRICIA Amato [facility]   6/17/2021  8:20 AM Sara Lozano DO HEM ONC [facility]         Consults   Medical Management - Dr Alejandra Husain  Lab Results   Component Value Date    HCT 28 9 (L) 02/03/2021     Lab Results   Component Value Date    GLUCOSE 200 (H) 05/18/2017    CALCIUM 9 4 02/04/2021    K 3 5 02/04/2021    CO2 26 02/04/2021     02/04/2021    BUN 22 02/04/2021    CREATININE 1 14 02/04/2021        Discharge Disposition  Home    Operative Procedures Performed  Procedure(s):  ARTHROPLASTY KNEE TOTAL    Discharge Instructions  Pradaxa 75 mg BID x 3 days and then resume his 150mg dose BID for DVT prophylaxis   WBAT  Follow up x 3 weeks in the office  Suture ends trimmed POD #10 to skin level  ?     Bronson Marcus PA-C  5:41 PM, 2/4/2021

## 2021-02-04 NOTE — TELEPHONE ENCOUNTER
daMedication Refill     Who is Calling  Patient   Medication predniSONE 2 5 mg tablet        How many pills left 2   Preferred [address]    Call back number [phone]   Relevant Information

## 2021-02-05 ENCOUNTER — TELEPHONE (OUTPATIENT)
Dept: HEMATOLOGY ONCOLOGY | Facility: CLINIC | Age: 66
End: 2021-02-05

## 2021-02-05 ENCOUNTER — PATIENT OUTREACH (OUTPATIENT)
Dept: FAMILY MEDICINE CLINIC | Facility: CLINIC | Age: 66
End: 2021-02-05

## 2021-02-05 NOTE — PROGRESS NOTES
Contacted Jesús to f/u on recent hospitalization for R TKR  He is managing well at home, using walker to ambulate  He denies pain, using tylenol when needed  He will be receiving home care services of PT  He denies needing additional assistance at home  Nutritional intake adequate  He is taking all medications as prescribed  Ortho f/u on 2/22  He will schedule PCP follow up  No transportation issues  I explained the bundle program but he declined participation

## 2021-02-05 NOTE — TELEPHONE ENCOUNTER
Patient calling for status of prednisone Rx  This did not go through to the pharmacy yesterday   Verbal Rx called to [facility]

## 2021-02-05 NOTE — PHYSICIAN ADVISOR
Current patient class: Inpatient  The patient is currently on Hospital Day: 3 at [address]      The patient was admitted to the hospital at [phone] on 2/3/21 for the following diagnosis:  Primary osteoarthritis of right knee [M17 11]       There is documentation in the medical record of an expected length of stay of at least 2 midnights  The patient is therefore expected to satisfy the 2 midnight benchmark and given the 2 midnight presumption is appropriate for INPATIENT ADMISSION  Given this expectation of a satisfying stay, CMS instructs us that the patient is most often appropriate for inpatient admission under part A provided medical necessity is documented in the chart  After review of the relevant documentation, labs, vital signs and test results, the patient is appropriate for INPATIENT ADMISSION  Admission to the hospital as an inpatient is a complex decision making process which requires the practitioner to consider the patients presenting complaint, history and physical examination and all relevant testing  With this in mind, in this case, the patient was deemed appropriate for INPATIENT ADMISSION  After review of the documentation and testing available at the time of the admission I concur with this clinical determination of medical necessity  Rationale is as follows: The patient is a 76 yrs old Male who presented for Right knee replacement  He tolerated the procedure well  Following surgery, he was taken to the recovery room, at which time, there was a consult placed for medical management  His operative course was uneventful  He is on chronic anticoagulation with dabigatran for history of PE/portal vein thrombosis  He also has ho 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  On postop day #1, he was able to start some physical therapy and was able to tolerate it well   On day of discharge he was in stable condition, showing no sign of deep vein thrombosis or pulmonary embolism  Incision was healing well at the time and showed no signs of infection  He was discharged home        The patients vitals on arrival were   ED Triage Vitals   Temperature Pulse Respirations Blood Pressure SpO2   02/02/21 0600 02/02/21 0600 02/02/21 0600 02/02/21 0600 02/02/21 0600   98 °F (36 7 °C) 89 18 143/96 96 %      Temp Source Heart Rate Source Patient Position - Orthostatic VS BP Location FiO2 (%)   02/02/21 0600 02/02/21 0600 02/02/21 1320 02/02/21 1320 --   Tympanic Monitor Lying Right arm       Pain Score       02/02/21 0934       6           Past Medical History:   Diagnosis Date    Anxiety     Arthritis     Autoimmune hemolytic anemia     Claustrophobia     DVT (deep venous thrombosis) (HCC)     GERD (gastroesophageal reflux disease)     Hearing loss, right     Hemolytic anemia (HCC)     History of transfusion     2018 - no adverse reaction    Hypertension     Palpitation     Portal vein thrombosis     PTSD (post-traumatic stress disorder)     Pulmonary emboli (HCC)     Tobacco abuse      Past Surgical History:   Procedure Laterality Date    COLONOSCOPY      ELBOW ARTHROPLASTY Left     bursectomy    KNEE SURGERY Right     meniscus tear    ID LAP,CHOLECYSTECTOMY/GRAPH N/A 12/23/2017    Procedure: CHOLECYSTECTOMY LAPAROSCOPIC with cholangiogram;  Surgeon: Veronica Quezada MD;  Location: [facility];  Service: General    ID REMOVAL SPLEEN, TOTAL N/A 5/18/2017    Procedure: LAPAROSCOPIC HAND ASSIST SPLENECTOMY;  Surgeon: Carol Flor MD;  [facility];  Service: Surgical Oncology    ID TOTAL KNEE ARTHROPLASTY Right 2/2/2021    Procedure: ARTHROPLASTY KNEE TOTAL;  Surgeon: Dami Prieto MD;  Location: [facility];  Service: Orthopedics    SHOULDER SURGERY Left     rotator cuff x4, reconstruction           Consults have been placed to:   IP CONSULT TO INTERNAL MEDICINE  IP CONSULT TO CASE MANAGEMENT    Vitals:    02/04/21 0300 02/04/21 0615 02/04/21 0700 02/04/21 1107   BP: 149/98 148/83 165/99    BP Location:   Left arm    Pulse: 89 100 (!) 106    Resp: 20 20 18    Temp: 98 1 °F (36 7 °C) 98 6 °F (37 °C) 97 8 °F (36 6 °C) 99 4 °F (37 4 °C)   TempSrc: Temporal Temporal Temporal    SpO2: 98% 98% 97%    Weight:       Height:           Most recent labs:    Recent Labs     02/03/21  0428 02/04/21  0431   WBC 15 51*  --    HGB 9 8*  --    HCT 28 9*  --      --    K 3 4* 3 5   CALCIUM 8 6 9 4   BUN 18 22   CREATININE 1 09 1 14       Scheduled Meds:  Continuous Infusions:No current facility-administered medications for this encounter  PRN Meds:      Surgical procedures (if appropriate):  Procedure(s):  ARTHROPLASTY KNEE TOTAL

## 2021-02-06 ENCOUNTER — DOCUMENTATION (OUTPATIENT)
Dept: SOCIAL WORK | Facility: HOSPITAL | Age: 66
End: 2021-02-06

## 2021-02-06 NOTE — PROGRESS NOTES
Change in [facility] date for [facility] PT    TC to patient on 2 6 to schedule PT [facility] and requesting initial [facility] visit for 2 8 2021       Thank you  Shania Gray

## 2021-02-08 ENCOUNTER — DOCUMENTATION (OUTPATIENT)
Dept: SOCIAL WORK | Facility: HOSPITAL | Age: 66
End: 2021-02-08

## 2021-02-08 NOTE — PROGRESS NOTES
Patient is to be seen by home therapy on 2 11 which would be POD 9  Are you ok with me trimming sutures on POD 9 or would you like me to wait until next week when I would see patient again? You may call me at [phone] or tiger text me      Thank you for your time  DIOGO CabaT

## 2021-02-08 NOTE — PROGRESS NOTES
Admission Report at [facility] has admitted your patient to [address] [place] [address] service with the following disciplines:      PT  This report is informational only, no responses is needed but if there are any questions please tiger text or call [phone]  Primary focus of home health care R TKA  Patient stated goals of care to be able to walk with no cane  Anticipated visit pattern and next visit date 2w3  Significant clinical findings   160 over 84 L arm  Renee Crow asymptomatic  Request for additional disciplines none at this time  Request for medication clarification   Based on Med Rec patient has following meds on med list but is not taking colace  oxycodone  senna    zinc sulfate  duplicate drug therapy noted betweeen colace and senna  Patient is not taking prazosin due to side effects   awaiting delivery of new med from the [address] [place] [address] for other order clarification none  Needs follow up physician appointments scheduled pcp tbs  Potential barriers to goal achievement pain  Other pertinent information -7 degrees knee ext     75 degress flexion L knee    Thank you for allowing us to participate in the care of your patient        Ra Jimmy

## 2021-02-10 ENCOUNTER — TELEPHONE (OUTPATIENT)
Dept: FAMILY MEDICINE CLINIC | Facility: CLINIC | Age: 66
End: 2021-02-10

## 2021-02-10 NOTE — TELEPHONE ENCOUNTER
Call visiting nurse and recommend patient take meds as per hospital discharge medication list  Okay to continue vitamin-C

## 2021-02-10 NOTE — TELEPHONE ENCOUNTER
Laurie michael from [place] [address] [facility] stating that she seen pt today and wanted to inform us that pt is not taking some of his medications  She states that he is not taking his Prazosin, Iron, Stool Softners (Colace and Senekot) and Oxycodone  She states that he is taking Vit C 1000 mg daily even though it isn't on his med list  Any advise?

## 2021-02-11 NOTE — PROGRESS NOTES
Thank you  Patient cancelled today 2 11 and rescheduled for tomorrow   Will trim to skin level during tomorrows session

## 2021-02-15 ENCOUNTER — TELEPHONE (OUTPATIENT)
Dept: HEMATOLOGY ONCOLOGY | Facility: CLINIC | Age: 66
End: 2021-02-15

## 2021-02-15 ENCOUNTER — LAB (OUTPATIENT)
Dept: LAB | Facility: MEDICAL CENTER | Age: 66
End: 2021-02-15
Payer: MEDICARE

## 2021-02-15 DIAGNOSIS — E83.19 IRON OVERLOAD: ICD-10-CM

## 2021-02-15 DIAGNOSIS — I10 ESSENTIAL HYPERTENSION: ICD-10-CM

## 2021-02-15 RX ORDER — HYDROCHLOROTHIAZIDE 25 MG/1
25 TABLET ORAL DAILY
Qty: 30 TABLET | Refills: 5 | Status: SHIPPED | OUTPATIENT
Start: 2021-02-15

## 2021-02-16 ENCOUNTER — DOCUMENTATION (OUTPATIENT)
Dept: SOCIAL WORK | Facility: HOSPITAL | Age: 66
End: 2021-02-16

## 2021-02-16 ENCOUNTER — TELEPHONE (OUTPATIENT)
Dept: HEMATOLOGY ONCOLOGY | Facility: CLINIC | Age: 66
End: 2021-02-16

## 2021-02-16 NOTE — TELEPHONE ENCOUNTER
Discussed patient's concerns and cbc and platelet count with Dr Pratima Lilly stated that Jesús's fatigue and increase shortness of breath are r/t his hgb of 9 7 and recent knee surgery  Dr Pratima Lilly requests that he start on a baby aspirin and to have a repeat cbc in one week  Discussed with Marco Antonio Baeza that he should go to the ER if he had dizziness, increase in sob and heart paliatations

## 2021-02-16 NOTE — PROGRESS NOTES
Wanted to make you aware that patient had cancelled yesterday and todays home PT visits due to not feeling well  Patient had blood work done yesterday and was awaiting call back from MD with results and POC  Patient is relating not feeling well due to his blood disorder  Will FU with patient via TC tomorrow to determine next tx session in home and patient to only be seen once this week for DC and plans to begin OPPT next week      Thank you  DIOGO VelasquezT

## 2021-02-19 ENCOUNTER — DOCUMENTATION (OUTPATIENT)
Dept: SOCIAL WORK | Facility: HOSPITAL | Age: 66
End: 2021-02-19

## 2021-02-19 NOTE — PROGRESS NOTES
Notification of DC from home PT    Patient dc via telephone at patient request today from home PT services and will begin OPPT 2 22 21      Thank you,  Sara Mcgee, DIOGOT

## 2021-02-22 ENCOUNTER — LAB (OUTPATIENT)
Dept: LAB | Facility: MEDICAL CENTER | Age: 66
End: 2021-02-22
Payer: MEDICARE

## 2021-02-22 DIAGNOSIS — D59.11 HEMOLYTIC ANEMIA DUE TO WARM ANTIBODY (HCC): ICD-10-CM

## 2021-02-22 LAB
BASOPHILS # BLD AUTO: 0.07 THOUSANDS/ΜL (ref 0–0.1)
BASOPHILS NFR BLD AUTO: 0 % (ref 0–1)
EOSINOPHIL # BLD AUTO: 0.1 THOUSAND/ΜL (ref 0–0.61)
EOSINOPHIL NFR BLD AUTO: 1 % (ref 0–6)
ERYTHROCYTE [DISTWIDTH] IN BLOOD BY AUTOMATED COUNT: 17.4 % (ref 11.6–15.1)
HCT VFR BLD AUTO: 28.4 % (ref 36.5–49.3)
HGB BLD-MCNC: 9.9 G/DL (ref 12–17)
IMM GRANULOCYTES # BLD AUTO: 0.27 THOUSAND/UL (ref 0–0.2)
IMM GRANULOCYTES NFR BLD AUTO: 1 % (ref 0–2)
LYMPHOCYTES # BLD AUTO: 0.98 THOUSANDS/ΜL (ref 0.6–4.47)
LYMPHOCYTES NFR BLD AUTO: 5 % (ref 14–44)
MCH RBC QN AUTO: 45.8 PG (ref 26.8–34.3)
MCHC RBC AUTO-ENTMCNC: 34.9 G/DL (ref 31.4–37.4)
MCV RBC AUTO: 132 FL (ref 82–98)
MONOCYTES # BLD AUTO: 1.89 THOUSAND/ΜL (ref 0.17–1.22)
MONOCYTES NFR BLD AUTO: 10 % (ref 4–12)
NEUTROPHILS # BLD AUTO: 16.68 THOUSANDS/ΜL (ref 1.85–7.62)
NEUTS SEG NFR BLD AUTO: 83 % (ref 43–75)
NRBC BLD AUTO-RTO: 1 /100 WBCS
PLATELET # BLD AUTO: 960 THOUSANDS/UL (ref 149–390)
PMV BLD AUTO: 9.8 FL (ref 8.9–12.7)
RBC # BLD AUTO: 2.16 MILLION/UL (ref 3.88–5.62)
WBC # BLD AUTO: 19.99 THOUSAND/UL (ref 4.31–10.16)

## 2021-02-22 PROCEDURE — 36415 COLL VENOUS BLD VENIPUNCTURE: CPT

## 2021-02-22 PROCEDURE — 85025 COMPLETE CBC W/AUTO DIFF WBC: CPT

## 2021-02-25 ENCOUNTER — TELEPHONE (OUTPATIENT)
Dept: HEMATOLOGY ONCOLOGY | Facility: CLINIC | Age: 66
End: 2021-02-25

## 2021-02-25 DIAGNOSIS — U07.1 COVID-19 VIRUS INFECTION: ICD-10-CM

## 2021-02-25 DIAGNOSIS — D59.11 HEMOLYTIC ANEMIA DUE TO WARM ANTIBODY (HCC): ICD-10-CM

## 2021-02-25 RX ORDER — PREDNISONE 2.5 MG
2.5 TABLET ORAL DAILY
Qty: 90 TABLET | Refills: 0
Start: 2021-02-25 | End: 2021-02-26

## 2021-02-25 RX ORDER — PREDNISONE 10 MG/1
10 TABLET ORAL DAILY
Qty: 90 TABLET | Refills: 0 | Status: SHIPPED | OUTPATIENT
Start: 2021-02-25 | End: 2021-04-05 | Stop reason: SDUPTHER

## 2021-02-25 NOTE — TELEPHONE ENCOUNTER
daMedication Refill     Who is Calling  Patient   Medication  predniSONE 2 5 mg tablet    predniSONE 10  mg tablet     How many pills left 4   Preferred [facility]acy [facility]    Call back number [phone]   Relevant Information  60 or 90 day supply please

## 2021-02-26 DIAGNOSIS — U07.1 COVID-19 VIRUS INFECTION: ICD-10-CM

## 2021-02-26 RX ORDER — PREDNISONE 2.5 MG
TABLET ORAL
Qty: 30 TABLET | Refills: 0 | Status: SHIPPED | OUTPATIENT
Start: 2021-02-26 | End: 2021-04-19 | Stop reason: SDUPTHER

## 2021-03-08 ENCOUNTER — LAB (OUTPATIENT)
Dept: LAB | Facility: MEDICAL CENTER | Age: 66
End: 2021-03-08
Payer: MEDICARE

## 2021-03-08 ENCOUNTER — TELEPHONE (OUTPATIENT)
Dept: HEMATOLOGY ONCOLOGY | Facility: CLINIC | Age: 66
End: 2021-03-08

## 2021-03-08 NOTE — TELEPHONE ENCOUNTER
Attempted to call pt without success  Left VM  Left message that it was ok for him to have his COVID VAC as long as he was not allergic to eggs or the flu vaccine        Will attempt to c/b 3/9/21

## 2021-03-08 NOTE — TELEPHONE ENCOUNTER
Patient has seen his results on MyChart and would like a call back to discuss, he can be reached back at [phone]

## 2021-03-08 NOTE — TELEPHONE ENCOUNTER
Patient advised of 3/8/21 HGB 9 1  Patient reports being concerned about his decrease in HGB from 2/22/21 HGB 9 9  Denies active signs of bleeding  Improving SOB and fatigue from a few weeks ago  Patient is on Prednisone 12 5 mg daily  FYI- patient will be getting his COVID 19 vaccine on 3/11/21  Please verify with Dr Cecy Best that it is ok? Patient is questioning if the Prednisone needs to be increased? Or other recommendations  Will forward to Dr Cesar Mattson (Self) [phone] ()

## 2021-03-09 DIAGNOSIS — D50.8 OTHER IRON DEFICIENCY ANEMIA: ICD-10-CM

## 2021-03-09 DIAGNOSIS — K21.00 GASTROESOPHAGEAL REFLUX DISEASE WITH ESOPHAGITIS WITHOUT HEMORRHAGE: Primary | ICD-10-CM

## 2021-03-09 DIAGNOSIS — E53.8 FOLATE DEFICIENCY: ICD-10-CM

## 2021-03-09 DIAGNOSIS — E53.8 B12 DEFICIENCY: ICD-10-CM

## 2021-03-09 NOTE — TELEPHONE ENCOUNTER
Discussed pt's hgb with Dr Jose R Castillo ordered: cmp ldh, haptoglobin, iron panel, folate and B12    Pt aware, to have labs drawn and will f/u with a call to our office to review results

## 2021-03-11 ENCOUNTER — TELEPHONE (OUTPATIENT)
Dept: HEMATOLOGY ONCOLOGY | Facility: CLINIC | Age: 66
End: 2021-03-11

## 2021-03-11 ENCOUNTER — APPOINTMENT (OUTPATIENT)
Dept: LAB | Facility: MEDICAL CENTER | Age: 66
End: 2021-03-11
Payer: MEDICARE

## 2021-03-11 DIAGNOSIS — E53.8 B12 DEFICIENCY: ICD-10-CM

## 2021-03-11 DIAGNOSIS — D50.8 OTHER IRON DEFICIENCY ANEMIA: ICD-10-CM

## 2021-03-11 DIAGNOSIS — K21.00 GASTROESOPHAGEAL REFLUX DISEASE WITH ESOPHAGITIS WITHOUT HEMORRHAGE: ICD-10-CM

## 2021-03-11 DIAGNOSIS — E53.8 FOLATE DEFICIENCY: ICD-10-CM

## 2021-03-11 LAB
ALBUMIN SERPL BCP-MCNC: 4.2 G/DL (ref 3.5–5)
ALP SERPL-CCNC: 83 U/L (ref 46–116)
ALT SERPL W P-5'-P-CCNC: 26 U/L (ref 12–78)
ANION GAP SERPL CALCULATED.3IONS-SCNC: 6 MMOL/L (ref 4–13)
AST SERPL W P-5'-P-CCNC: 29 U/L (ref 5–45)
BILIRUB SERPL-MCNC: 2.69 MG/DL (ref 0.2–1)
BUN SERPL-MCNC: 26 MG/DL (ref 5–25)
CALCIUM SERPL-MCNC: 9.8 MG/DL (ref 8.3–10.1)
CHLORIDE SERPL-SCNC: 109 MMOL/L (ref 100–108)
CO2 SERPL-SCNC: 28 MMOL/L (ref 21–32)
CREAT SERPL-MCNC: 1.27 MG/DL (ref 0.6–1.3)
FERRITIN SERPL-MCNC: 513 NG/ML (ref 8–388)
FOLATE SERPL-MCNC: 8.6 NG/ML (ref 3.1–17.5)
GFR SERPL CREATININE-BSD FRML MDRD: 58 ML/MIN/1.73SQ M
GLUCOSE P FAST SERPL-MCNC: 86 MG/DL (ref 65–99)
IRON SATN MFR SERPL: 41 %
IRON SERPL-MCNC: 222 UG/DL (ref 65–175)
POTASSIUM SERPL-SCNC: 4 MMOL/L (ref 3.5–5.3)
PROT SERPL-MCNC: 7.2 G/DL (ref 6.4–8.2)
SODIUM SERPL-SCNC: 143 MMOL/L (ref 136–145)
TIBC SERPL-MCNC: 536 UG/DL (ref 250–450)
VIT B12 SERPL-MCNC: 815 PG/ML (ref 100–900)

## 2021-03-11 PROCEDURE — 36415 COLL VENOUS BLD VENIPUNCTURE: CPT

## 2021-03-11 PROCEDURE — 80053 COMPREHEN METABOLIC PANEL: CPT

## 2021-03-11 PROCEDURE — 83010 ASSAY OF HAPTOGLOBIN QUANT: CPT

## 2021-03-11 PROCEDURE — 82607 VITAMIN B-12: CPT

## 2021-03-11 PROCEDURE — 83615 LACTATE (LD) (LDH) ENZYME: CPT

## 2021-03-11 PROCEDURE — 83540 ASSAY OF IRON: CPT

## 2021-03-11 PROCEDURE — 82746 ASSAY OF FOLIC ACID SERUM: CPT

## 2021-03-11 PROCEDURE — 83625 ASSAY OF LDH ENZYMES: CPT

## 2021-03-11 PROCEDURE — 83550 IRON BINDING TEST: CPT

## 2021-03-11 PROCEDURE — 82728 ASSAY OF FERRITIN: CPT

## 2021-03-11 NOTE — TELEPHONE ENCOUNTER
Geovanny Nicole from the lab is calling to get clarification on LD body fluid lab  Advised that she can cancel that lab entered  Labs ordered should be as follows: Dr Pratima Lilly ordered: cmp ldh, haptoglobin, iron panel, folate and B12(3/9/21 epic note)    Tiffany verbalized understanding of above

## 2021-03-12 LAB
HAPTOGLOB SERPL-MCNC: 37 MG/DL (ref 32–363)
LDH SERPL-CCNC: 368 IU/L (ref 121–224)
LDH1 CFR SERPL ELPH: 35 % (ref 17–32)
LDH2 CFR SERPL ELPH: 36 % (ref 25–40)
LDH3 CFR SERPL ELPH: 15 % (ref 17–27)
LDH4 CFR SERPL ELPH: 7 % (ref 5–13)
LDH5 CFR SERPL ELPH: 7 % (ref 4–20)

## 2021-03-15 ENCOUNTER — TELEPHONE (OUTPATIENT)
Dept: HEMATOLOGY ONCOLOGY | Facility: CLINIC | Age: 66
End: 2021-03-15

## 2021-03-15 DIAGNOSIS — D50.8 OTHER IRON DEFICIENCY ANEMIA: Primary | ICD-10-CM

## 2021-03-15 DIAGNOSIS — D59.11 HEMOLYTIC ANEMIA DUE TO WARM ANTIBODY (HCC): ICD-10-CM

## 2021-03-15 NOTE — TELEPHONE ENCOUNTER
Patient would like their lab results     Person calling in  Patient   Lab Draw Date 03/12   Lab 8 [facility]    Call Back Number [phone]

## 2021-03-15 NOTE — TELEPHONE ENCOUNTER
Dr Vicente French reviewed labs  Went over labs result with pt  Retic count and occult studies were ordered  F/u was arranged for 3/29  Pt voiced understanding

## 2021-03-22 ENCOUNTER — APPOINTMENT (OUTPATIENT)
Dept: LAB | Facility: MEDICAL CENTER | Age: 66
End: 2021-03-22
Payer: MEDICARE

## 2021-03-22 DIAGNOSIS — D59.11 HEMOLYTIC ANEMIA DUE TO WARM ANTIBODY (HCC): ICD-10-CM

## 2021-03-22 DIAGNOSIS — D50.8 OTHER IRON DEFICIENCY ANEMIA: ICD-10-CM

## 2021-03-22 DIAGNOSIS — E83.19 IRON OVERLOAD: ICD-10-CM

## 2021-03-22 LAB
ANION GAP SERPL CALCULATED.3IONS-SCNC: 9 MMOL/L (ref 4–13)
BUN SERPL-MCNC: 22 MG/DL (ref 5–25)
CALCIUM SERPL-MCNC: 9.7 MG/DL (ref 8.3–10.1)
CHLORIDE SERPL-SCNC: 108 MMOL/L (ref 100–108)
CO2 SERPL-SCNC: 25 MMOL/L (ref 21–32)
CREAT SERPL-MCNC: 1.47 MG/DL (ref 0.6–1.3)
ERYTHROCYTE [DISTWIDTH] IN BLOOD BY AUTOMATED COUNT: 14.4 % (ref 11.6–15.1)
FERRITIN SERPL-MCNC: 445 NG/ML (ref 8–388)
GFR SERPL CREATININE-BSD FRML MDRD: 49 ML/MIN/1.73SQ M
GLUCOSE P FAST SERPL-MCNC: 95 MG/DL (ref 65–99)
HCT VFR BLD AUTO: 28.5 % (ref 36.5–49.3)
HGB BLD-MCNC: 8.5 G/DL (ref 12–17)
MCH RBC QN AUTO: 37.8 PG (ref 26.8–34.3)
MCHC RBC AUTO-ENTMCNC: 29.8 G/DL (ref 31.4–37.4)
MCV RBC AUTO: 127 FL (ref 82–98)
PLATELET # BLD AUTO: 675 THOUSANDS/UL (ref 149–390)
PMV BLD AUTO: 9.6 FL (ref 8.9–12.7)
POTASSIUM SERPL-SCNC: 3.7 MMOL/L (ref 3.5–5.3)
RBC # BLD AUTO: 2.25 MILLION/UL (ref 3.88–5.62)
RETICS # AUTO: ABNORMAL 10*3/UL (ref 14356–105094)
RETICS # CALC: 12.02 % (ref 0.37–1.87)
SODIUM SERPL-SCNC: 142 MMOL/L (ref 136–145)
WBC # BLD AUTO: 10.38 THOUSAND/UL (ref 4.31–10.16)

## 2021-03-22 PROCEDURE — 36415 COLL VENOUS BLD VENIPUNCTURE: CPT

## 2021-03-22 PROCEDURE — 82728 ASSAY OF FERRITIN: CPT

## 2021-03-22 PROCEDURE — 85045 AUTOMATED RETICULOCYTE COUNT: CPT

## 2021-03-22 PROCEDURE — 80048 BASIC METABOLIC PNL TOTAL CA: CPT

## 2021-03-22 PROCEDURE — 85027 COMPLETE CBC AUTOMATED: CPT

## 2021-03-25 ENCOUNTER — APPOINTMENT (OUTPATIENT)
Dept: LAB | Facility: MEDICAL CENTER | Age: 66
End: 2021-03-25
Payer: MEDICARE

## 2021-03-25 DIAGNOSIS — D59.11 HEMOLYTIC ANEMIA DUE TO WARM ANTIBODY (HCC): ICD-10-CM

## 2021-03-25 DIAGNOSIS — D50.8 OTHER IRON DEFICIENCY ANEMIA: ICD-10-CM

## 2021-03-25 LAB — HEMOCCULT STL QL IA: NEGATIVE

## 2021-03-25 PROCEDURE — G0328 FECAL BLOOD SCRN IMMUNOASSAY: HCPCS

## 2021-03-29 ENCOUNTER — OFFICE VISIT (OUTPATIENT)
Dept: HEMATOLOGY ONCOLOGY | Facility: CLINIC | Age: 66
End: 2021-03-29
Payer: MEDICARE

## 2021-03-29 VITALS
BODY MASS INDEX: 29.4 KG/M2 | OXYGEN SATURATION: 97 % | DIASTOLIC BLOOD PRESSURE: 74 MMHG | TEMPERATURE: 97.9 F | RESPIRATION RATE: 17 BRPM | HEART RATE: 89 BPM | HEIGHT: 68 IN | WEIGHT: 194 LBS | SYSTOLIC BLOOD PRESSURE: 142 MMHG

## 2021-03-29 DIAGNOSIS — D59.11 HEMOLYTIC ANEMIA DUE TO WARM ANTIBODY (HCC): Primary | ICD-10-CM

## 2021-03-29 PROCEDURE — 99215 OFFICE O/P EST HI 40 MIN: CPT | Performed by: INTERNAL MEDICINE

## 2021-03-29 NOTE — PROGRESS NOTES
[facility]  [address] [facility] [place]  [facility] [email or web address] [address]  Brooks Yi,1955, [id number]  03/29/21    Discussion:   In summary, this is a 50-year-old male history of autoimmune hemolytic anemia status post splenectomy in the past   He had been maintained on prednisone 12-15 mg p o  Daily for ongoing low-level hemolysis  He has persistent leukocytosis and thrombocytosis due to status post splenectomy  In December 2020 he had COVID infection  His anemia worsened  Prednisone was increased to 40 mg p o  Daily  About 2 weeks after discharge from [facility], hemoglobin had normalized  February 2, 2021 he underwent right knee replacement  Hemoglobin decreased to approximately 9 9  This was stable for few weeks until it decreased over 4 weeks to 8 5  Prednisone was increased to 40  LDH is only slightly elevated, bilirubin 2 6  Creatinine 1 27  Iron saturation 41%  Vitamin B12 and folate are sufficient  , predominantly LDH-1  Haptoglobin is normal   Reticulocytes 12%  Findings are equivocal for ongoing hemolysis but I believe that that is the main  of his current anemia  He denies any melena or hematochezia to suggest GI bleeding  Overall he feels very well and states he feels better than he has for several months, possibly due to the higher dose of prednisone  We jointly agreed to continue prednisone at 40 mg p o  Daily and reassess hemoglobin in 1 week  If this has stabilized I would say that much slower taper of prednisone would be indicated  I discussed the above with the patient  The patient  voiced understanding and agreement   ______________________________________________________________________    Chief Complaint   Patient presents with    Follow-up       HPI:  Oncology History    No history exists  Interval History:  Cl;inically stable     ECOG-  1 - Symptomatic but completely ambulatory    Review of Systems   Constitutional: Negative for chills and fever  HENT: Negative for nosebleeds  Eyes: Negative for discharge  Respiratory: Negative for cough and shortness of breath  Cardiovascular: Negative for chest pain  Gastrointestinal: Negative for abdominal pain, constipation and diarrhea  Endocrine: Negative for polydipsia  Genitourinary: Negative for hematuria  Musculoskeletal: Negative for arthralgias  Skin: Negative for color change  Allergic/Immunologic: Negative for immunocompromised state  Neurological: Negative for dizziness and headaches  Hematological: Negative for adenopathy  Psychiatric/Behavioral: Negative for agitation         Past Medical History:   Diagnosis Date    Anxiety     Arthritis     Autoimmune hemolytic anemia     Claustrophobia     DVT (deep venous thrombosis) (HCC)     GERD (gastroesophageal reflux disease)     Hearing loss, right     Hemolytic anemia (HCC)     History of transfusion     2018 - no adverse reaction    Hypertension     Palpitation     Portal vein thrombosis     PTSD (post-traumatic stress disorder)     Pulmonary emboli (HCC)     Tobacco abuse      Patient Active Problem List   Diagnosis    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    Hyperbilirubinemia    Symptomatic anemia    Hx of pulmonary embolus    Portal vein thrombosis    Elevated blood pressure reading without diagnosis of hypertension    Shortness of breath    Gastroesophageal reflux disease    Autoimmune hemolytic anemia    ARABELLA (acute kidney injury) ([address] )    Splenomegaly    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    Posttraumatic stress disorder    Essential hypertension    Glucose intolerance (impaired glucose tolerance)    Renal insufficiency    Auto immune neutropenia (HCC)    Osteoarthritis of knee    Pain in joint, lower leg    Pain in left knee    COVID-19    Thrombocytosis ([address] )    Primary localized osteoarthrosis of the knee, right       Current Outpatient Medications:     acetaminophen (TYLENOL) 325 mg tablet, Take 2 tablets (650 mg total) by mouth every 6 (six) hours as needed for mild pain, Disp:  , Rfl: 0    ascorbic acid (VITAMIN C) 1000 MG tablet, Take 1 tablet (1,000 mg total) by mouth every 12 (twelve) hours for 6 doses, Disp: 6 tablet, Rfl: 0    dabigatran etexilate (Pradaxa) 150 mg capsu, Take 1 capsule (150 mg total) by mouth 2 (two) times a day, Disp: 60 capsule, Rfl: 4    dabigatran etexilate (PRADAXA) 75 mg capsule, Take 1 capsule (75 mg total) by mouth every 12 (twelve) hours, Disp: 4 capsule, Rfl: 0    docusate sodium (COLACE) 100 mg capsule, Take 1 capsule (100 mg total) by mouth 2 (two) times a day, Disp: 10 capsule, Rfl: 0    ferrous sulfate 325 (65 Fe) mg tablet, Take 1 tablet (325 mg total) by mouth 2 (two) times a day with meals, Disp:  , Rfl: 0    hydrochlorothiazide (HYDRODIURIL) 25 mg tablet, Take 1 tablet (25 mg total) by mouth daily, Disp: 30 tablet, Rfl: 5    metoprolol succinate (TOPROL-XL) 25 mg 24 hr tablet, Take 0 5 tablets (12 5 mg total) by mouth daily, Disp: 30 tablet, Rfl: 5    NON FORMULARY, Medicinal Marijuana prn, Disp: , Rfl:     pantoprazole (PROTONIX) 40 mg tablet, Take 1 tablet (40 mg total) by mouth daily, Disp: 90 tablet, Rfl: 3    potassium chloride (K-DUR,KLOR-CON) 20 mEq tablet, Take 1 tablet (20 mEq total) by mouth daily, Disp: 30 tablet, Rfl: 3    prazosin (MINIPRESS) 1 mg capsule, Take 1 mg by mouth daily at bedtime , Disp: , Rfl:     predniSONE 10 mg tablet, Take 1 tablet (10 mg total) by mouth daily, Disp: 90 tablet, Rfl: 0    predniSONE 2 5 mg tablet, TAKE ONE TABLET BY MOUTH DAILY , Disp: 30 tablet, Rfl: 0    senna (SENOKOT) 8 6 mg, Take 1 tablet (8 6 mg total) by mouth daily, Disp:  , Rfl: 0    VITAMIN D PO, Take 1,000 Units by mouth daily , Disp: , Rfl:     VITAMIN E PO, Take 1,000 Units by mouth daily , Disp: , Rfl:     zinc sulfate (ZINCATE) 220 mg capsule, Take 1 capsule (220 mg total) by mouth daily for 2 doses, Disp: 2 capsule, Rfl: 0  Allergies   Allergen Reactions    Iodinated Diagnostic Agents Hives     Unsure if this is still an allergy     Past Surgical History:   Procedure Laterality Date    COLONOSCOPY      ELBOW ARTHROPLASTY Left     bursectomy    KNEE SURGERY Right     meniscus tear    MD LAP,CHOLECYSTECTOMY/GRAPH N/A 12/23/2017    Procedure: CHOLECYSTECTOMY LAPAROSCOPIC with cholangiogram;  Surgeon: Yue Cano MD;  Location: [facility];  Service: General    MD REMOVAL SPLEEN, TOTAL N/A 5/18/2017    Procedure: LAPAROSCOPIC HAND ASSIST SPLENECTOMY;  Surgeon: Tamir Lay MD;  Location: [facility];  Service: Surgical Oncology    MD TOTAL KNEE ARTHROPLASTY Right 2/2/2021    Procedure: ARTHROPLASTY KNEE TOTAL;  Surgeon: Lilliam Caro MD;  Location: [facility];  Service: Orthopedics    SHOULDER SURGERY Left     rotator cuff x4, reconstruction     Social History     Objective:  Vitals:    03/29/21 0841   Pulse: 89   Temp: 97 9 °F (36 6 °C)   TempSrc: Tympanic   Weight: 88 kg (194 lb)   Height: 5' 8" (1 727 m)     Physical Exam  Constitutional:       Appearance: He is well-developed  HENT:      Head: Normocephalic and atraumatic  Eyes:      Pupils: Pupils are equal, round, and reactive to light  Neck:      Musculoskeletal: Neck supple  Cardiovascular:      Rate and Rhythm: Normal rate and regular rhythm  Heart sounds: No murmur  Pulmonary:      Breath sounds: Normal breath sounds  No wheezing or rales  Abdominal:      Palpations: Abdomen is soft  Tenderness: There is no abdominal tenderness  Musculoskeletal: Normal range of motion  General: No tenderness  Lymphadenopathy:      Cervical: No cervical adenopathy  Skin:     Findings: No erythema or rash  Neurological:      Mental Status: He is alert and oriented to person, place, and time  Cranial Nerves: No cranial nerve deficit  Deep Tendon Reflexes: Reflexes are normal and symmetric  Psychiatric:         Behavior: Behavior normal            Labs: I personally reviewed the labs and imaging pertinent to this patient care

## 2021-04-05 ENCOUNTER — TRANSCRIBE ORDERS (OUTPATIENT)
Dept: PHYSICAL THERAPY | Facility: CLINIC | Age: 66
End: 2021-04-05

## 2021-04-05 ENCOUNTER — APPOINTMENT (OUTPATIENT)
Dept: LAB | Facility: MEDICAL CENTER | Age: 66
End: 2021-04-05
Payer: MEDICARE

## 2021-04-05 ENCOUNTER — TELEPHONE (OUTPATIENT)
Dept: HEMATOLOGY ONCOLOGY | Facility: CLINIC | Age: 66
End: 2021-04-05

## 2021-04-05 DIAGNOSIS — E83.19 IRON OVERLOAD: ICD-10-CM

## 2021-04-05 DIAGNOSIS — D59.11 HEMOLYTIC ANEMIA DUE TO WARM ANTIBODY (HCC): ICD-10-CM

## 2021-04-05 LAB
ERYTHROCYTE [DISTWIDTH] IN BLOOD BY AUTOMATED COUNT: 14.6 % (ref 11.6–15.1)
FERRITIN SERPL-MCNC: 308 NG/ML (ref 8–388)
HCT VFR BLD AUTO: 31.5 % (ref 36.5–49.3)
HGB BLD-MCNC: 11 G/DL (ref 12–17)
MCH RBC QN AUTO: 43.8 PG (ref 26.8–34.3)
MCHC RBC AUTO-ENTMCNC: 34.9 G/DL (ref 31.4–37.4)
MCV RBC AUTO: 126 FL (ref 82–98)
PLATELET # BLD AUTO: 581 THOUSANDS/UL (ref 149–390)
PMV BLD AUTO: 10.1 FL (ref 8.9–12.7)
RBC # BLD AUTO: 2.51 MILLION/UL (ref 3.88–5.62)
WBC # BLD AUTO: 13.98 THOUSAND/UL (ref 4.31–10.16)

## 2021-04-05 PROCEDURE — 36415 COLL VENOUS BLD VENIPUNCTURE: CPT

## 2021-04-05 PROCEDURE — 85027 COMPLETE CBC AUTOMATED: CPT

## 2021-04-05 PROCEDURE — 82728 ASSAY OF FERRITIN: CPT

## 2021-04-05 RX ORDER — PREDNISONE 10 MG/1
10 TABLET ORAL DAILY
Qty: 90 TABLET | Refills: 1 | Status: SHIPPED | OUTPATIENT
Start: 2021-04-05 | End: 2021-10-21 | Stop reason: SDUPTHER

## 2021-04-05 NOTE — TELEPHONE ENCOUNTER
Patient states that he completed blood work today which he viewed and would like to know if he should lower her prednisone medication  Best call back [phone]

## 2021-04-05 NOTE — TELEPHONE ENCOUNTER
Discussed patient's hgb with Dr Camden Graham would like Jesús to decrease his prednisone to 30mg po daily  Attempted to c/b pt without success  Left VM with the instructions to decrease his prednisone to 30 mg daily and to continue to have weekly cbc  Also asked for pt to c/b in one week to reviewe hgb and prednisone dose  (Left c/b # of [facility] to cb that he has rcvd this message)

## 2021-04-05 NOTE — TELEPHONE ENCOUNTER
Chantelle Bronson returned call and will decrease his prednisone to 30 mg daily  Will c/b in one week to review cbc and dosage of prednisone

## 2021-04-05 NOTE — TELEPHONE ENCOUNTER
Most recent CBC showed hgb = 11 0  Patient is currently on prednisone 40mg PO daily  Patient is asking if he should reduce his prednisone dose    Will send to RN to review with Dr Stevo Mahmood

## 2021-04-08 ENCOUNTER — TRANSCRIBE ORDERS (OUTPATIENT)
Dept: PHYSICAL THERAPY | Facility: CLINIC | Age: 66
End: 2021-04-08

## 2021-04-08 ENCOUNTER — EVALUATION (OUTPATIENT)
Dept: PHYSICAL THERAPY | Facility: CLINIC | Age: 66
End: 2021-04-08
Payer: MEDICARE

## 2021-04-08 DIAGNOSIS — Z47.1 AFTERCARE FOLLOWING RIGHT KNEE JOINT REPLACEMENT SURGERY: Primary | ICD-10-CM

## 2021-04-08 DIAGNOSIS — Z47.1 AFTERCARE FOLLOWING RIGHT KNEE JOINT REPLACEMENT SURGERY: ICD-10-CM

## 2021-04-08 DIAGNOSIS — M25.561 RIGHT KNEE PAIN, UNSPECIFIED CHRONICITY: ICD-10-CM

## 2021-04-08 DIAGNOSIS — Z96.651 AFTERCARE FOLLOWING RIGHT KNEE JOINT REPLACEMENT SURGERY: Primary | ICD-10-CM

## 2021-04-08 DIAGNOSIS — M25.561 RIGHT KNEE PAIN, UNSPECIFIED CHRONICITY: Primary | ICD-10-CM

## 2021-04-08 DIAGNOSIS — Z96.651 AFTERCARE FOLLOWING RIGHT KNEE JOINT REPLACEMENT SURGERY: ICD-10-CM

## 2021-04-08 PROCEDURE — 97110 THERAPEUTIC EXERCISES: CPT | Performed by: PHYSICAL THERAPIST

## 2021-04-08 PROCEDURE — 97162 PT EVAL MOD COMPLEX 30 MIN: CPT | Performed by: PHYSICAL THERAPIST

## 2021-04-08 NOTE — PROGRESS NOTES
PT Evaluation     Today's date: 2021  Patient name: Charlsie Scheuermann  : 1955  MRN: [id number]  Referring provider: Isa Colbert MD  Dx:   Encounter Diagnosis     ICD-10-CM    1  Aftercare following right knee joint replacement surgery  Z47 1     Z96 651    2  Right knee pain, unspecified chronicity  M25 561                   Assessment  Assessment details: Pt is a 78year old male presenting to PT s/p right total knee replacement on 21  He presents with right knee and posterior compartment pain/restrictions, decreased knee flexion range of motion, decreased strength, and decreased tolerance to activity  Patient would benefit from skilled PT services to address these issues and to maximize function  Thank you for the referral        Symptom irritability: lowUnderstanding of Dx/Px/POC: good   Prognosis: good    Goals  STG  1  Decrease pain 20-50% in 4 weeks  2  Increase strength 1/2 grade in 4 weeks  3  Increase ROM 5-10 degrees in 4 weeks  LTG  1  Ambulation is improved to maximal level of function  2  Patient is independent with HEP  3  Recreational performance in related activities is improved to maximal level of function    Plan  Patient would benefit from: skilled physical therapy  Other planned modality interventions: prn  Planned therapy interventions: manual therapy, therapeutic exercise, therapeutic activities, neuromuscular re-education and home exercise program  Frequency: 2x week  Duration in weeks: 8  Treatment plan discussed with: patient        Subjective Evaluation    History of Present Illness  Date of surgery: 2021  Mechanism of injury: surgery  Mechanism of injury: Pt is a 78year old male presenting to PT s/p right total knee replacement on 21  Pt had 3 sessions of PT at home prior to today, first outpatient visit  Has been doing exercises at home   Pt states 2 days ago he tucked his leg under him and had increased pain from twisting motion but otherwise has been doing well   Pt continues to have right leg edema into ankle  Reports main complaints of pain in posterior knee at hamstring and proximal/lateral calf  Denies any numbness/tingling  Symptom AGGS: descending>ascending stairs, bending, turning leg in, transitions in/out of truck  Symptom EASE: ice, elevation  Follow-up with MD scheduled for 2021  Pt is taking prednisone for other issues but is not currently taking anything for knee except Tylenol as needed  Pt is currently driving, not taking pain medication  Pt enjoys playing golf  Pain  Current pain ratin  At best pain ratin  At worst pain ratin  Quality: sharp, dull ache and throbbing (stiffness)  Progression: improved    Social Support  Lives in: multiple-level home    Working: retired  Hand dominance: right      Diagnostic Tests    FCE comments: Had doppler done in hospital and repeat one month later outpatient, results in chart unremarkable  Patient Goals  Patient goals for therapy: decreased edema, decreased pain, increased motion, increased strength, independence with ADLs/IADLs and return to sport/leisure activities          Objective     General Comments:      Knee Comments  Lumbar AROM: wfl, mildly limited all planes  DL squat: unweighting of R lower extremity, 75% of motion prior to onset of knee pain  SL squat: pain at end range, truncal lean, hip collapse  Moderate right knee and lower extremity edema, no excessive redness,warmth noted   No pain with calf squeeze  Knee AROM: 0* extension, 100* flexion  Knee PROM: 0* extension, 105* flexion   Knee MMT: 4/5 ext, 3+/5 flexion pain  Hip IR/ER MMT: 4/5  DF: 5/5  Decreased hamstring length bilaterally, increased neural tension in right LE  Posterior compartment tightness/restrictions in proximal calf, distal hamstring  Limited gastroc/soleus length                 Precautions: s/p RIGHT total knee replacement on 21, h/o DVT, PE, HTN      Manuals             Prone STM to distal hamstring/proximal calf    Knee flexion PROM in prone  NV                                                   Neuro Re-Ed                                                                                                        Ther Ex             Bike warm up NV            Standing gastroc block stretch :30x3            Standing soleus block stretch :30x3            VG DL squats NV            Active elongation hamstring stretch NV            RF stretch EOT NV                                                   Ther Activity             Functional marches at bar NV            Functional heel raise If able            Steps with eccentric lowering NV                         Gait Training                                       Modalities             prn

## 2021-04-08 NOTE — LETTER
2021    Ty Penaloza MD  [facility]    Patient: Livier Peralta   YOB: 1955   Date of Visit: 2021     Encounter Diagnosis     ICD-10-CM    1  Aftercare following right knee joint replacement surgery  Z47 1     Z96 651    2  Right knee pain, unspecified chronicity  M25 561        Dear Dr Nix Heal: Thank you for your recent referral of Livier Peralta  Please review the attached evaluation summary from Ernie's recent visit  Please verify that you agree with the plan of care by signing the attached order  If you have any questions or concerns, please do not hesitate to call  I sincerely appreciate the opportunity to share in the care of one of your patients and hope to have another opportunity to work with you in the near future  Sincerely,    Gutierrez Peralta, PT      Referring Provider:      I certify that I have read the below Plan of Care and certify the need for these services furnished under this plan of treatment while under my care  Ty Penaloza MD  [place] [address]  Via Fax: [phone]          PT Evaluation     Today's date: 2021  Patient name: Livier Peralta  : 1955  MRN: [id number]  Referring provider: Angel Carmona MD  Dx:   Encounter Diagnosis     ICD-10-CM    1  Aftercare following right knee joint replacement surgery  Z47 1     Z96 651    2  Right knee pain, unspecified chronicity  M25 561                   Assessment  Assessment details: Pt is a 78year old male presenting to PT s/p right total knee replacement on 21  He presents with right knee and posterior compartment pain/restrictions, decreased knee flexion range of motion, decreased strength, and decreased tolerance to activity  Patient would benefit from skilled PT services to address these issues and to maximize function    Thank you for the referral        Symptom irritability: lowUnderstanding of Dx/Px/POC: good   Prognosis: good    Goals  STG  1  Decrease pain 20-50% in 4 weeks  2  Increase strength 1/2 grade in 4 weeks  3  Increase ROM 5-10 degrees in 4 weeks  LTG  1  Ambulation is improved to maximal level of function  2  Patient is independent with HEP  3  Recreational performance in related activities is improved to maximal level of function    Plan  Patient would benefit from: skilled physical therapy  Other planned modality interventions: prn  Planned therapy interventions: manual therapy, therapeutic exercise, therapeutic activities, neuromuscular re-education and home exercise program  Frequency: 2x week  Duration in weeks: 8  Treatment plan discussed with: patient        Subjective Evaluation    History of Present Illness  Date of surgery: 2021  Mechanism of injury: surgery  Mechanism of injury: Pt is a 78year old male presenting to PT s/p right total knee replacement on 21  Pt had 3 sessions of PT at home prior to today, first outpatient visit  Has been doing exercises at home  Pt states 2 days ago he tucked his leg under him and had increased pain from twisting motion but otherwise has been doing well  Pt continues to have right leg edema into ankle  Reports main complaints of pain in posterior knee at hamstring and proximal/lateral calf  Denies any numbness/tingling  Symptom AGGS: descending>ascending stairs, bending, turning leg in, transitions in/out of truck  Symptom EASE: ice, elevation  Follow-up with MD scheduled for 2021  Pt is taking prednisone for other issues but is not currently taking anything for knee except Tylenol as needed  Pt is currently driving, not taking pain medication  Pt enjoys playing golf  Pain  Current pain ratin  At best pain ratin  At worst pain ratin  Quality: sharp, dull ache and throbbing (stiffness)  Progression: improved    Social Support  Lives in: multiple-level home    Working: retired    Hand dominance: right      Diagnostic Tests    FCE comments: Had doppler done in hospital and repeat one month later outpatient, results in chart unremarkable  Patient Goals  Patient goals for therapy: decreased edema, decreased pain, increased motion, increased strength, independence with ADLs/IADLs and return to sport/leisure activities          Objective     General Comments:      Knee Comments  Lumbar AROM: wfl, mildly limited all planes  DL squat: unweighting of R lower extremity, 75% of motion prior to onset of knee pain  SL squat: pain at end range, truncal lean, hip collapse  Moderate right knee and lower extremity edema, no excessive redness,warmth noted   No pain with calf squeeze  Knee AROM: 0* extension, 100* flexion  Knee PROM: 0* extension, 105* flexion   Knee MMT: 4/5 ext, 3+/5 flexion pain  Hip IR/ER MMT: 4/5  DF: 5/5  Decreased hamstring length bilaterally, increased neural tension in right LE  Posterior compartment tightness/restrictions in proximal calf, distal hamstring  Limited gastroc/soleus length                 Precautions: s/p RIGHT total knee replacement on 2/2/21, h/o DVT, PE, HTN      Manuals 4/8            Prone STM to distal hamstring/proximal calf    Knee flexion PROM in prone  NV                                                   Neuro Re-Ed                                                                                                        Ther Ex             Bike warm up NV            Standing gastroc block stretch :30x3            Standing soleus block stretch :30x3            VG DL squats NV            Active elongation hamstring stretch NV            RF stretch EOT NV                                                   Ther Activity             Functional marches at bar NV            Functional heel raise If able            Steps with eccentric lowering NV                         Gait Training                                       Modalities             prn

## 2021-04-12 ENCOUNTER — APPOINTMENT (OUTPATIENT)
Dept: LAB | Facility: MEDICAL CENTER | Age: 66
End: 2021-04-12
Payer: MEDICARE

## 2021-04-12 ENCOUNTER — OFFICE VISIT (OUTPATIENT)
Dept: PHYSICAL THERAPY | Facility: CLINIC | Age: 66
End: 2021-04-12
Payer: MEDICARE

## 2021-04-12 DIAGNOSIS — Z47.1 AFTERCARE FOLLOWING RIGHT KNEE JOINT REPLACEMENT SURGERY: Primary | ICD-10-CM

## 2021-04-12 DIAGNOSIS — Z96.651 AFTERCARE FOLLOWING RIGHT KNEE JOINT REPLACEMENT SURGERY: Primary | ICD-10-CM

## 2021-04-12 DIAGNOSIS — M25.561 RIGHT KNEE PAIN, UNSPECIFIED CHRONICITY: ICD-10-CM

## 2021-04-12 DIAGNOSIS — D59.11 HEMOLYTIC ANEMIA DUE TO WARM ANTIBODY (HCC): ICD-10-CM

## 2021-04-12 LAB
ERYTHROCYTE [DISTWIDTH] IN BLOOD BY AUTOMATED COUNT: 15.8 % (ref 11.6–15.1)
HCT VFR BLD AUTO: 32.4 % (ref 36.5–49.3)
HGB BLD-MCNC: 11.2 G/DL (ref 12–17)
MCH RBC QN AUTO: 43.8 PG (ref 26.8–34.3)
MCHC RBC AUTO-ENTMCNC: 34.6 G/DL (ref 31.4–37.4)
MCV RBC AUTO: 127 FL (ref 82–98)
PLATELET # BLD AUTO: 546 THOUSANDS/UL (ref 149–390)
PMV BLD AUTO: 10.1 FL (ref 8.9–12.7)
RBC # BLD AUTO: 2.56 MILLION/UL (ref 3.88–5.62)
WBC # BLD AUTO: 15.88 THOUSAND/UL (ref 4.31–10.16)

## 2021-04-12 PROCEDURE — 97110 THERAPEUTIC EXERCISES: CPT

## 2021-04-12 PROCEDURE — 85027 COMPLETE CBC AUTOMATED: CPT

## 2021-04-12 PROCEDURE — 36415 COLL VENOUS BLD VENIPUNCTURE: CPT

## 2021-04-12 PROCEDURE — 97140 MANUAL THERAPY 1/> REGIONS: CPT

## 2021-04-12 NOTE — PROGRESS NOTES
Daily Note     Today's date: 2021  Patient name: Temo Roach  : 1955  MRN: [id number]  Referring provider: Ana Moore MD  Dx:   Encounter Diagnosis     ICD-10-CM    1  Aftercare following right knee joint replacement surgery  Z47 1     Z96 651    2  Right knee pain, unspecified chronicity  M25 561                   Subjective: Pt reports performing home exercise with decrease right knee pain/tightness since IE  Pt cont's to report swelling right knee to ankle and elevating/icing as needed  Objective: See treatment diary below      Assessment: Tolerated treatment well  Pt very tight with RF stretching  Performed manual therapy in supine secondary to poor tolerance to prone position and c/o LBP  Pt responded well to manual therapy and exercise as noted with improved gait/mobility reported post treatment  Patient would benefit from continued PT      Plan: Continue per plan of care        Precautions: s/p RIGHT total knee replacement on 21, h/o DVT, PE, HTN      Manuals            Prone STM to distal hamstring/proximal calf    Knee flexion PROM in prone  NV supine GH    Also knee extension PROM                                                  Neuro Re-Ed                                                                                                        Ther Ex             Bike warm up NV 6 min           Standing gastroc block stretch :30x3 30"x3           Standing soleus block stretch :30x3 30"x3           VG DL squats NV NV           Active elongation hamstring stretch NV 10"x5           RF stretch EOT NV 5"x10 each           Supine hip flexor stretch  2 min b/l                                     Ther Activity             Functional marches at bar NV nv           Functional heel raise If able            Steps with eccentric lowering NV nv                        Gait Training                                       Modalities             prn

## 2021-04-13 DIAGNOSIS — Z23 ENCOUNTER FOR IMMUNIZATION: ICD-10-CM

## 2021-04-15 ENCOUNTER — OFFICE VISIT (OUTPATIENT)
Dept: PHYSICAL THERAPY | Facility: CLINIC | Age: 66
End: 2021-04-15
Payer: MEDICARE

## 2021-04-15 DIAGNOSIS — Z47.1 AFTERCARE FOLLOWING RIGHT KNEE JOINT REPLACEMENT SURGERY: Primary | ICD-10-CM

## 2021-04-15 DIAGNOSIS — M25.561 RIGHT KNEE PAIN, UNSPECIFIED CHRONICITY: ICD-10-CM

## 2021-04-15 DIAGNOSIS — Z96.651 AFTERCARE FOLLOWING RIGHT KNEE JOINT REPLACEMENT SURGERY: Primary | ICD-10-CM

## 2021-04-15 PROCEDURE — 97140 MANUAL THERAPY 1/> REGIONS: CPT | Performed by: PHYSICAL THERAPIST

## 2021-04-15 PROCEDURE — 97110 THERAPEUTIC EXERCISES: CPT | Performed by: PHYSICAL THERAPIST

## 2021-04-15 NOTE — PROGRESS NOTES
Daily Note     Today's date: 4/15/2021  Patient name: Alaina Contreras  : 1955  MRN: [id number]  Referring provider: Kevin Kate MD  Dx:   Encounter Diagnosis     ICD-10-CM    1  Aftercare following right knee joint replacement surgery  Z47 1     Z96 651    2  Right knee pain, unspecified chronicity  M25 561                   Subjective: Pt reports his knee is feeling "really good" at pre-tx, main complaints of bilateral hip/groin tightness and soreness stating he feels he slept weirdly last night  Objective: See treatment diary below  Assessment: Tolerated treatment well  Reduced right lower extremity swelling noted this session  Good tolerance to manuals and TE as noted  Cont'd to perform manuals in supine due to poor tolerance to prone positioning  No pain present post-tx  Patient would benefit from continued PT      Plan: Continue per plan of care        Precautions: s/p RIGHT total knee replacement on 21, h/o DVT, PE, HTN      Manuals 4/8 4/12 4/15          Prone STM to distal hamstring/proximal calf    Knee flexion PROM in prone  NV supine GH    Also knee extension PROM supine KF    Also knee extension PROM                                                 Neuro Re-Ed                                                                                                        Ther Ex             Bike warm up NV 6 min 10 min          Standing gastroc block stretch :30x3 30"x3 30"x3          Standing soleus block stretch :30x3 30"x3 30"x3          VG DL squats NV NV nv          Active elongation hamstring stretch NV 10"x5 :10x5 each          RF stretch EOT NV 5"x10 each nv          Supine hip flexor stretch  2 min b/l 2 min b/l          Leg press   80# 2x10                       Ther Activity             Functional marches at bar NV nv nv          Functional heel raise If able  nv          Steps with eccentric lowering NV nv x10 on R                       Gait Training Modalities             prn

## 2021-04-19 ENCOUNTER — TELEPHONE (OUTPATIENT)
Dept: HEMATOLOGY ONCOLOGY | Facility: CLINIC | Age: 66
End: 2021-04-19

## 2021-04-19 ENCOUNTER — APPOINTMENT (OUTPATIENT)
Dept: LAB | Facility: MEDICAL CENTER | Age: 66
End: 2021-04-19
Payer: MEDICARE

## 2021-04-19 ENCOUNTER — TRANSCRIBE ORDERS (OUTPATIENT)
Dept: ADMINISTRATIVE | Facility: HOSPITAL | Age: 66
End: 2021-04-19

## 2021-04-19 ENCOUNTER — OFFICE VISIT (OUTPATIENT)
Dept: PHYSICAL THERAPY | Facility: CLINIC | Age: 66
End: 2021-04-19
Payer: MEDICARE

## 2021-04-19 DIAGNOSIS — D59.11 HEMOLYTIC ANEMIA DUE TO WARM ANTIBODY (HCC): ICD-10-CM

## 2021-04-19 DIAGNOSIS — D59.10 AUTOIMMUNE HEMOLYTIC ANEMIA (HCC): Primary | ICD-10-CM

## 2021-04-19 DIAGNOSIS — M25.561 RIGHT KNEE PAIN, UNSPECIFIED CHRONICITY: ICD-10-CM

## 2021-04-19 DIAGNOSIS — Z96.651 AFTERCARE FOLLOWING RIGHT KNEE JOINT REPLACEMENT SURGERY: Primary | ICD-10-CM

## 2021-04-19 DIAGNOSIS — Z47.1 AFTERCARE FOLLOWING RIGHT KNEE JOINT REPLACEMENT SURGERY: Primary | ICD-10-CM

## 2021-04-19 DIAGNOSIS — U07.1 COVID-19 VIRUS INFECTION: ICD-10-CM

## 2021-04-19 DIAGNOSIS — D59.11 HEMOLYTIC ANEMIA DUE TO WARM ANTIBODY (HCC): Primary | ICD-10-CM

## 2021-04-19 LAB
BASOPHILS # BLD AUTO: 0.03 THOUSANDS/ΜL (ref 0–0.1)
BASOPHILS NFR BLD AUTO: 0 % (ref 0–1)
EOSINOPHIL # BLD AUTO: 0.1 THOUSAND/ΜL (ref 0–0.61)
EOSINOPHIL NFR BLD AUTO: 1 % (ref 0–6)
ERYTHROCYTE [DISTWIDTH] IN BLOOD BY AUTOMATED COUNT: 14.6 % (ref 11.6–15.1)
HCT VFR BLD AUTO: 35.9 % (ref 36.5–49.3)
HGB BLD-MCNC: 12.3 G/DL (ref 12–17)
IMM GRANULOCYTES # BLD AUTO: 0.08 THOUSAND/UL (ref 0–0.2)
IMM GRANULOCYTES NFR BLD AUTO: 1 % (ref 0–2)
LYMPHOCYTES # BLD AUTO: 2.02 THOUSANDS/ΜL (ref 0.6–4.47)
LYMPHOCYTES NFR BLD AUTO: 17 % (ref 14–44)
MCH RBC QN AUTO: 42.1 PG (ref 26.8–34.3)
MCHC RBC AUTO-ENTMCNC: 34.3 G/DL (ref 31.4–37.4)
MCV RBC AUTO: 123 FL (ref 82–98)
MONOCYTES # BLD AUTO: 1.14 THOUSAND/ΜL (ref 0.17–1.22)
MONOCYTES NFR BLD AUTO: 9 % (ref 4–12)
NEUTROPHILS # BLD AUTO: 8.72 THOUSANDS/ΜL (ref 1.85–7.62)
NEUTS SEG NFR BLD AUTO: 72 % (ref 43–75)
NRBC BLD AUTO-RTO: 0 /100 WBCS
PLATELET # BLD AUTO: 529 THOUSANDS/UL (ref 149–390)
PMV BLD AUTO: 10.2 FL (ref 8.9–12.7)
RBC # BLD AUTO: 2.92 MILLION/UL (ref 3.88–5.62)
WBC # BLD AUTO: 12.09 THOUSAND/UL (ref 4.31–10.16)

## 2021-04-19 PROCEDURE — 97112 NEUROMUSCULAR REEDUCATION: CPT

## 2021-04-19 PROCEDURE — 36415 COLL VENOUS BLD VENIPUNCTURE: CPT

## 2021-04-19 PROCEDURE — 85025 COMPLETE CBC W/AUTO DIFF WBC: CPT

## 2021-04-19 PROCEDURE — 97110 THERAPEUTIC EXERCISES: CPT

## 2021-04-19 PROCEDURE — 97140 MANUAL THERAPY 1/> REGIONS: CPT

## 2021-04-19 RX ORDER — PREDNISONE 2.5 MG
2.5 TABLET ORAL DAILY
Qty: 30 TABLET | Refills: 0 | Status: SHIPPED | OUTPATIENT
Start: 2021-04-19 | End: 2021-05-07 | Stop reason: SDUPTHER

## 2021-04-19 NOTE — PROGRESS NOTES
Daily Note     Today's date: 2021  Patient name: Jeanine Coello  : 1955  MRN: [id number]  Referring provider: Aissatou Ayala MD  Dx:   Encounter Diagnosis     ICD-10-CM    1  Aftercare following right knee joint replacement surgery  Z47 1     Z96 651    2  Right knee pain, unspecified chronicity  M25 561                   Subjective: Pt cont's to report improvement overall stating his right knee is feeling really good and b/l hip/groin pain decreased  Pt reports increase right LE functional mobility and strength  Pt states he is able to go up/down stairs normal now without compensating  Pt states 90% improved since starting therapy  Objective: See treatment diary below  Pt seen x4 visits with FOTO outcome measure 55 at IE and 73 this visit  Assessment: Tolerated treatment well  Right lower extremity swelling cont's to decrease  Good tolerance to manuals and TE as noted  Verbal/tactile cues provided with exercise for proper technique  Pt progressing steadily with right knee mobility and strength  Pt denied pain post treatment  Plan: Continue per plan of care  Pt has MD appt 21 and will await MD orders        Precautions: s/p RIGHT total knee replacement on 21, h/o DVT, PE, HTN      Manuals 4/8 4/12 4/15 4/19         Prone STM to distal hamstring/proximal calf    Knee flexion PROM in prone  NV supine GH    Also knee extension PROM supine KF    Also knee extension PROM supine GH    Also knee extension PROM                                                Neuro Re-Ed                                                                                                        Ther Ex             Bike warm up NV 6 min 10 min 10 min         Standing gastroc block stretch :30x3 30"x3 30"x3 30"x3         Standing soleus block stretch :30x3 30"x3 30"x3 30"x3         VG DL squats NV NV nv          Active elongation hamstring stretch NV 10"x5 :10x5 each 10"x5 each         RF stretch EOT NV 5"x10 each nv          Supine hip flexor stretch  2 min b/l 2 min b/l 2 min b/l         Leg press   80# 2x10 80# 3x10                       Ther Activity             Functional marches at bar NV nv nv          Functional heel raise If able  nv          Steps with eccentric lowering NV nv x10 on R                       Gait Training                                       Modalities             prn

## 2021-04-19 NOTE — TELEPHONE ENCOUNTER
Patient would like their lab results     Person calling in  Patient   Lab Draw Date 04/19    Lab Draw [facility]    Call Back Number [phone]    +      Patient is also requesting for new CBC script to be placed

## 2021-04-19 NOTE — TELEPHONE ENCOUNTER
Patient called and advised of today's lab results  Patient is on Prednisone 30 mg daily  Please review with Dr Vilma Jordan further Prednisone directions and lab frequency  Patient will need new lab orders per Dr Nagi Stark direction        Shanti Gore (Self) [phone] (H)

## 2021-04-19 NOTE — TELEPHONE ENCOUNTER
Called pt and explained that he should decrease his prednisone to 20mg daily  He should repeat labs next week and call for further instructions  He verbalized understanding  He requested his prednisone 2 5mg script be refilled  Script pended to Dr Vonnie Bautista

## 2021-04-26 ENCOUNTER — APPOINTMENT (OUTPATIENT)
Dept: LAB | Facility: MEDICAL CENTER | Age: 66
End: 2021-04-26
Payer: MEDICARE

## 2021-04-26 DIAGNOSIS — D59.11 HEMOLYTIC ANEMIA DUE TO WARM ANTIBODY (HCC): ICD-10-CM

## 2021-04-26 DIAGNOSIS — E83.19 IRON OVERLOAD: ICD-10-CM

## 2021-04-26 DIAGNOSIS — D59.10 AUTOIMMUNE HEMOLYTIC ANEMIA (HCC): ICD-10-CM

## 2021-04-26 LAB
ANION GAP SERPL CALCULATED.3IONS-SCNC: 5 MMOL/L (ref 4–13)
BASOPHILS # BLD AUTO: 0.05 THOUSANDS/ΜL (ref 0–0.1)
BASOPHILS NFR BLD AUTO: 0 % (ref 0–1)
BUN SERPL-MCNC: 25 MG/DL (ref 5–25)
CALCIUM SERPL-MCNC: 9.1 MG/DL (ref 8.3–10.1)
CHLORIDE SERPL-SCNC: 106 MMOL/L (ref 100–108)
CO2 SERPL-SCNC: 28 MMOL/L (ref 21–32)
CREAT SERPL-MCNC: 1.35 MG/DL (ref 0.6–1.3)
EOSINOPHIL # BLD AUTO: 0.13 THOUSAND/ΜL (ref 0–0.61)
EOSINOPHIL NFR BLD AUTO: 1 % (ref 0–6)
ERYTHROCYTE [DISTWIDTH] IN BLOOD BY AUTOMATED COUNT: 13.9 % (ref 11.6–15.1)
FERRITIN SERPL-MCNC: 171 NG/ML (ref 8–388)
GFR SERPL CREATININE-BSD FRML MDRD: 54 ML/MIN/1.73SQ M
GLUCOSE P FAST SERPL-MCNC: 131 MG/DL (ref 65–99)
HCT VFR BLD AUTO: 36.6 % (ref 36.5–49.3)
HGB BLD-MCNC: 12.3 G/DL (ref 12–17)
IMM GRANULOCYTES # BLD AUTO: 0.04 THOUSAND/UL (ref 0–0.2)
IMM GRANULOCYTES NFR BLD AUTO: 0 % (ref 0–2)
LYMPHOCYTES # BLD AUTO: 3.11 THOUSANDS/ΜL (ref 0.6–4.47)
LYMPHOCYTES NFR BLD AUTO: 26 % (ref 14–44)
MCH RBC QN AUTO: 40.5 PG (ref 26.8–34.3)
MCHC RBC AUTO-ENTMCNC: 33.6 G/DL (ref 31.4–37.4)
MCV RBC AUTO: 120 FL (ref 82–98)
MONOCYTES # BLD AUTO: 0.99 THOUSAND/ΜL (ref 0.17–1.22)
MONOCYTES NFR BLD AUTO: 8 % (ref 4–12)
NEUTROPHILS # BLD AUTO: 7.88 THOUSANDS/ΜL (ref 1.85–7.62)
NEUTS SEG NFR BLD AUTO: 65 % (ref 43–75)
NRBC BLD AUTO-RTO: 0 /100 WBCS
PLATELET # BLD AUTO: 489 THOUSANDS/UL (ref 149–390)
PMV BLD AUTO: 10.2 FL (ref 8.9–12.7)
POTASSIUM SERPL-SCNC: 3.6 MMOL/L (ref 3.5–5.3)
RBC # BLD AUTO: 3.04 MILLION/UL (ref 3.88–5.62)
SODIUM SERPL-SCNC: 139 MMOL/L (ref 136–145)
WBC # BLD AUTO: 12.2 THOUSAND/UL (ref 4.31–10.16)

## 2021-04-26 PROCEDURE — 36415 COLL VENOUS BLD VENIPUNCTURE: CPT

## 2021-04-26 PROCEDURE — 82728 ASSAY OF FERRITIN: CPT

## 2021-04-26 PROCEDURE — 85025 COMPLETE CBC W/AUTO DIFF WBC: CPT

## 2021-04-26 PROCEDURE — 80048 BASIC METABOLIC PNL TOTAL CA: CPT

## 2021-05-03 ENCOUNTER — APPOINTMENT (OUTPATIENT)
Dept: LAB | Facility: MEDICAL CENTER | Age: 66
End: 2021-05-03
Payer: MEDICARE

## 2021-05-03 ENCOUNTER — TELEPHONE (OUTPATIENT)
Dept: HEMATOLOGY ONCOLOGY | Facility: CLINIC | Age: 66
End: 2021-05-03

## 2021-05-03 DIAGNOSIS — D59.10 AUTOIMMUNE HEMOLYTIC ANEMIA (HCC): Primary | ICD-10-CM

## 2021-05-03 DIAGNOSIS — D59.11 HEMOLYTIC ANEMIA DUE TO WARM ANTIBODY (HCC): ICD-10-CM

## 2021-05-03 LAB
BASOPHILS # BLD AUTO: 0.06 THOUSANDS/ΜL (ref 0–0.1)
BASOPHILS NFR BLD AUTO: 1 % (ref 0–1)
EOSINOPHIL # BLD AUTO: 0.17 THOUSAND/ΜL (ref 0–0.61)
EOSINOPHIL NFR BLD AUTO: 2 % (ref 0–6)
ERYTHROCYTE [DISTWIDTH] IN BLOOD BY AUTOMATED COUNT: 14.3 % (ref 11.6–15.1)
HCT VFR BLD AUTO: 35.7 % (ref 36.5–49.3)
HGB BLD-MCNC: 12.1 G/DL (ref 12–17)
IMM GRANULOCYTES # BLD AUTO: 0.03 THOUSAND/UL (ref 0–0.2)
IMM GRANULOCYTES NFR BLD AUTO: 0 % (ref 0–2)
LYMPHOCYTES # BLD AUTO: 2.62 THOUSANDS/ΜL (ref 0.6–4.47)
LYMPHOCYTES NFR BLD AUTO: 29 % (ref 14–44)
MCH RBC QN AUTO: 39.9 PG (ref 26.8–34.3)
MCHC RBC AUTO-ENTMCNC: 33.9 G/DL (ref 31.4–37.4)
MCV RBC AUTO: 118 FL (ref 82–98)
MONOCYTES # BLD AUTO: 0.85 THOUSAND/ΜL (ref 0.17–1.22)
MONOCYTES NFR BLD AUTO: 9 % (ref 4–12)
NEUTROPHILS # BLD AUTO: 5.28 THOUSANDS/ΜL (ref 1.85–7.62)
NEUTS SEG NFR BLD AUTO: 59 % (ref 43–75)
NRBC BLD AUTO-RTO: 0 /100 WBCS
PLATELET # BLD AUTO: 495 THOUSANDS/UL (ref 149–390)
PMV BLD AUTO: 10.2 FL (ref 8.9–12.7)
RBC # BLD AUTO: 3.03 MILLION/UL (ref 3.88–5.62)
WBC # BLD AUTO: 9.01 THOUSAND/UL (ref 4.31–10.16)

## 2021-05-03 PROCEDURE — 36415 COLL VENOUS BLD VENIPUNCTURE: CPT

## 2021-05-03 PROCEDURE — 85025 COMPLETE CBC W/AUTO DIFF WBC: CPT

## 2021-05-03 NOTE — TELEPHONE ENCOUNTER
Medication Refill     Who is Calling  Patient    Medication predniSONE 10 mg tablet        How many pills left 0   Preferred Pharmacy / [facility] [address], [address]   Call back number [phone]   Relevant Information         Patient states that he completed blood work today and would like to confirm if any changes will be made to his medication  and is requesting a standing order for a cbc

## 2021-05-03 NOTE — TELEPHONE ENCOUNTER
Spoke with Matias and instructed him to decrease his dose to 10 mg  Dr Chris Herrera did say he could alternate 10mg and 15mg every other day but pt prefers to stick with 10mg for the next week and see how his labs are  Pt will call back next week for further instruction

## 2021-05-03 NOTE — TELEPHONE ENCOUNTER
Returned call to patient  Additional weekly CBC orders added  Patient is currently on prednisone 15mg PO daily  Will send to RN to review CBC results with Dr Stevo Mahmood and return call to patient with new instructions

## 2021-05-04 ENCOUNTER — PATIENT OUTREACH (OUTPATIENT)
Dept: FAMILY MEDICINE CLINIC | Facility: CLINIC | Age: 66
End: 2021-05-04

## 2021-05-07 DIAGNOSIS — U07.1 COVID-19 VIRUS INFECTION: ICD-10-CM

## 2021-05-07 RX ORDER — PREDNISONE 2.5 MG
2.5 TABLET ORAL DAILY
Qty: 30 TABLET | Refills: 0 | Status: CANCELLED | OUTPATIENT
Start: 2021-05-07

## 2021-05-07 RX ORDER — PREDNISONE 2.5 MG
2.5 TABLET ORAL DAILY
Qty: 30 TABLET | Refills: 0 | Status: SHIPPED | OUTPATIENT
Start: 2021-05-07 | End: 2021-06-29 | Stop reason: SDUPTHER

## 2021-05-10 ENCOUNTER — APPOINTMENT (OUTPATIENT)
Dept: LAB | Facility: MEDICAL CENTER | Age: 66
End: 2021-05-10
Payer: MEDICARE

## 2021-05-10 LAB
BASOPHILS # BLD AUTO: 0.09 THOUSANDS/ΜL (ref 0–0.1)
BASOPHILS NFR BLD AUTO: 1 % (ref 0–1)
EOSINOPHIL # BLD AUTO: 0.3 THOUSAND/ΜL (ref 0–0.61)
EOSINOPHIL NFR BLD AUTO: 3 % (ref 0–6)
ERYTHROCYTE [DISTWIDTH] IN BLOOD BY AUTOMATED COUNT: 14.3 % (ref 11.6–15.1)
HCT VFR BLD AUTO: 36.7 % (ref 36.5–49.3)
HGB BLD-MCNC: 12.5 G/DL (ref 12–17)
IMM GRANULOCYTES # BLD AUTO: 0.03 THOUSAND/UL (ref 0–0.2)
IMM GRANULOCYTES NFR BLD AUTO: 0 % (ref 0–2)
LYMPHOCYTES # BLD AUTO: 3.49 THOUSANDS/ΜL (ref 0.6–4.47)
LYMPHOCYTES NFR BLD AUTO: 30 % (ref 14–44)
MCH RBC QN AUTO: 40.2 PG (ref 26.8–34.3)
MCHC RBC AUTO-ENTMCNC: 34.1 G/DL (ref 31.4–37.4)
MCV RBC AUTO: 118 FL (ref 82–98)
MONOCYTES # BLD AUTO: 1.02 THOUSAND/ΜL (ref 0.17–1.22)
MONOCYTES NFR BLD AUTO: 9 % (ref 4–12)
NEUTROPHILS # BLD AUTO: 6.77 THOUSANDS/ΜL (ref 1.85–7.62)
NEUTS SEG NFR BLD AUTO: 57 % (ref 43–75)
NRBC BLD AUTO-RTO: 0 /100 WBCS
PLATELET # BLD AUTO: 533 THOUSANDS/UL (ref 149–390)
PMV BLD AUTO: 10.1 FL (ref 8.9–12.7)
RBC # BLD AUTO: 3.11 MILLION/UL (ref 3.88–5.62)
WBC # BLD AUTO: 11.7 THOUSAND/UL (ref 4.31–10.16)

## 2021-05-10 PROCEDURE — 85025 COMPLETE CBC W/AUTO DIFF WBC: CPT

## 2021-05-10 PROCEDURE — 36415 COLL VENOUS BLD VENIPUNCTURE: CPT

## 2021-05-11 ENCOUNTER — TELEPHONE (OUTPATIENT)
Dept: HEMATOLOGY ONCOLOGY | Facility: CLINIC | Age: 66
End: 2021-05-11

## 2021-05-11 DIAGNOSIS — D59.10 AUTOIMMUNE HEMOLYTIC ANEMIA (HCC): Primary | ICD-10-CM

## 2021-05-11 DIAGNOSIS — D50.8 OTHER IRON DEFICIENCY ANEMIA: ICD-10-CM

## 2021-05-11 NOTE — TELEPHONE ENCOUNTER
Patient states he need his medical marijuana renewed and is also requesting a  ferritin blood test ordered, Best call back [phone]

## 2021-05-11 NOTE — TELEPHONE ENCOUNTER
LM for pt letting him know that ferritin level was entered into epic  I also let pt know that her is not due for his recerification until 6/11/21, he is to call the office back on 6/11 so we can renew his [facility] certification

## 2021-05-17 ENCOUNTER — APPOINTMENT (OUTPATIENT)
Dept: LAB | Facility: MEDICAL CENTER | Age: 66
End: 2021-05-17
Payer: MEDICARE

## 2021-05-17 DIAGNOSIS — D59.10 AUTOIMMUNE HEMOLYTIC ANEMIA (HCC): ICD-10-CM

## 2021-05-17 DIAGNOSIS — D50.8 OTHER IRON DEFICIENCY ANEMIA: ICD-10-CM

## 2021-05-17 LAB — FERRITIN SERPL-MCNC: 135 NG/ML (ref 8–388)

## 2021-05-17 PROCEDURE — 82728 ASSAY OF FERRITIN: CPT

## 2021-06-01 ENCOUNTER — APPOINTMENT (OUTPATIENT)
Dept: LAB | Facility: MEDICAL CENTER | Age: 66
End: 2021-06-01
Payer: MEDICARE

## 2021-06-03 ENCOUNTER — OFFICE VISIT (OUTPATIENT)
Dept: FAMILY MEDICINE CLINIC | Facility: CLINIC | Age: 66
End: 2021-06-03
Payer: MEDICARE

## 2021-06-03 VITALS
WEIGHT: 189.6 LBS | TEMPERATURE: 97.8 F | OXYGEN SATURATION: 98 % | SYSTOLIC BLOOD PRESSURE: 138 MMHG | DIASTOLIC BLOOD PRESSURE: 90 MMHG | HEIGHT: 67 IN | HEART RATE: 76 BPM | RESPIRATION RATE: 16 BRPM | BODY MASS INDEX: 29.76 KG/M2

## 2021-06-03 DIAGNOSIS — M17.0 PRIMARY OSTEOARTHRITIS OF BOTH KNEES: ICD-10-CM

## 2021-06-03 DIAGNOSIS — K21.00 GASTROESOPHAGEAL REFLUX DISEASE WITH ESOPHAGITIS WITHOUT HEMORRHAGE: ICD-10-CM

## 2021-06-03 DIAGNOSIS — I10 ESSENTIAL HYPERTENSION: Primary | ICD-10-CM

## 2021-06-03 DIAGNOSIS — F43.10 POSTTRAUMATIC STRESS DISORDER: ICD-10-CM

## 2021-06-03 DIAGNOSIS — D59.10 AUTOIMMUNE HEMOLYTIC ANEMIA (HCC): ICD-10-CM

## 2021-06-03 PROCEDURE — 1124F ACP DISCUSS-NO DSCNMKR DOCD: CPT | Performed by: FAMILY MEDICINE

## 2021-06-03 PROCEDURE — 99214 OFFICE O/P EST MOD 30 MIN: CPT | Performed by: FAMILY MEDICINE

## 2021-06-03 RX ORDER — SERTRALINE HYDROCHLORIDE 100 MG/1
TABLET, FILM COATED ORAL
COMMUNITY
End: 2021-11-02

## 2021-06-03 NOTE — PROGRESS NOTES
Assessment/Plan:    Patient to continue present treatment  Patient instructed to follow a low-fat, low-salt and a low-carbohydrate diet and get regular aerobic exercise 150 minutes per week  Follow up with specialists as scheduled and return to the office in 6 months  Diagnoses and all orders for this visit:    Essential hypertension    Gastroesophageal reflux disease with esophagitis without hemorrhage    Primary osteoarthritis of both knees    Autoimmune hemolytic anemia    Posttraumatic stress disorder    Other orders  -     sertraline (ZOLOFT) 100 mg tablet; sertraline 100 mg tablet          Subjective:      Patient ID: Gordon Wong is a 78 y o  male  Patient is here for follow-up appoint for chronic conditions and he is not fasting today  Patient has been feeling well overall and still healing from his total knee replacement surgery  Patient has been playing golf twice a week and riding his bike occasionally  Patient follows with Dr Stevo Mahmood and [address] [place] [address] Hematology/Oncology regularly  Patient is up-to-date on colon screening  Hypertension  This is a chronic problem  The problem is controlled  Associated symptoms include anxiety  Pertinent negatives include no blurred vision, chest pain, headaches, orthopnea, palpitations, peripheral edema, PND or shortness of breath  Risk factors for coronary artery disease include dyslipidemia, male gender and smoking/tobacco exposure  Past treatments include beta blockers and diuretics  The current treatment provides significant improvement  There are no compliance problems  There is no history of CAD/MI or CVA  Heartburn  He reports no abdominal pain, no belching, no chest pain, no choking, no coughing, no dysphagia, no early satiety, no globus sensation, no heartburn, no hoarse voice or no nausea  The problem has been resolved  Nothing aggravates the symptoms  Associated symptoms include anemia   Pertinent negatives include no fatigue, melena, muscle weakness, orthopnea or weight loss  He has tried a PPI for the symptoms  The treatment provided significant relief  The following portions of the patient's history were reviewed and updated as appropriate: allergies, current medications, past family history, past medical history, past social history, past surgical history and problem list     Review of Systems   Constitutional: Negative for fatigue and weight loss  HENT: Negative for hoarse voice  Eyes: Negative for blurred vision  Respiratory: Negative for cough, choking and shortness of breath  Cardiovascular: Negative for chest pain, palpitations, orthopnea and PND  Gastrointestinal: Negative for abdominal pain, dysphagia, heartburn, melena and nausea  Musculoskeletal: Negative for muscle weakness  Neurological: Negative for headaches  Objective:      /90   Pulse 76   Temp 97 8 °F (36 6 °C) (Temporal)   Resp 16   Ht 5' 7" (1 702 m)   Wt 86 kg (189 lb 9 6 oz)   SpO2 98%   BMI 29 70 kg/m²          Physical Exam  Constitutional:       General: He is not in acute distress  Appearance: Normal appearance  HENT:      Head: Normocephalic  Mouth/Throat:      Mouth: Mucous membranes are moist    Eyes:      General: No scleral icterus  Conjunctiva/sclera: Conjunctivae normal    Neck:      Musculoskeletal: Neck supple  Vascular: No carotid bruit  Cardiovascular:      Rate and Rhythm: Normal rate and regular rhythm  Pulmonary:      Effort: Pulmonary effort is normal       Breath sounds: Normal breath sounds  Abdominal:      Palpations: Abdomen is soft  Tenderness: There is no abdominal tenderness  Musculoskeletal:      Right lower leg: No edema  Left lower leg: No edema  Lymphadenopathy:      Cervical: No cervical adenopathy  Skin:     General: Skin is warm and dry  Neurological:      General: No focal deficit present        Mental Status: He is alert and oriented to person, place, and time  Psychiatric:         Mood and Affect: Mood normal          Behavior: Behavior normal          Thought Content: Thought content normal          Judgment: Judgment normal          BMI Counseling: Body mass index is 29 7 kg/m²  The BMI is above normal  Nutrition recommendations include reducing portion sizes, decreasing overall calorie intake, 3-5 servings of fruits/vegetables daily, reducing fast food intake, consuming healthier snacks, decreasing soda and/or juice intake, moderation in carbohydrate intake and reducing intake of saturated fat and trans fat  Exercise recommendations include moderate aerobic physical activity for 150 minutes/week

## 2021-06-07 ENCOUNTER — TELEPHONE (OUTPATIENT)
Dept: HEMATOLOGY ONCOLOGY | Facility: CLINIC | Age: 66
End: 2021-06-07

## 2021-06-07 NOTE — TELEPHONE ENCOUNTER
Patient is calling in requesting a renewal for his medical Samantha Gamino, he can be reached back at [phone]

## 2021-06-14 ENCOUNTER — APPOINTMENT (OUTPATIENT)
Dept: LAB | Facility: MEDICAL CENTER | Age: 66
End: 2021-06-14
Payer: MEDICARE

## 2021-06-14 ENCOUNTER — TRANSCRIBE ORDERS (OUTPATIENT)
Dept: ADMINISTRATIVE | Facility: HOSPITAL | Age: 66
End: 2021-06-14

## 2021-06-14 DIAGNOSIS — D59.10 HEMOLYTIC ANEMIA DUE TO ANTIBODY (HCC): Primary | ICD-10-CM

## 2021-06-14 DIAGNOSIS — D59.10 HEMOLYTIC ANEMIA DUE TO ANTIBODY (HCC): ICD-10-CM

## 2021-06-14 LAB — FERRITIN SERPL-MCNC: 113 NG/ML (ref 8–388)

## 2021-06-14 PROCEDURE — 82728 ASSAY OF FERRITIN: CPT

## 2021-06-17 ENCOUNTER — OFFICE VISIT (OUTPATIENT)
Dept: HEMATOLOGY ONCOLOGY | Facility: CLINIC | Age: 66
End: 2021-06-17
Payer: MEDICARE

## 2021-06-17 VITALS
WEIGHT: 192 LBS | HEART RATE: 82 BPM | TEMPERATURE: 97.4 F | SYSTOLIC BLOOD PRESSURE: 134 MMHG | OXYGEN SATURATION: 98 % | BODY MASS INDEX: 29.1 KG/M2 | DIASTOLIC BLOOD PRESSURE: 76 MMHG | RESPIRATION RATE: 18 BRPM | HEIGHT: 68 IN

## 2021-06-17 DIAGNOSIS — D50.0 IRON DEFICIENCY ANEMIA SECONDARY TO BLOOD LOSS (CHRONIC): ICD-10-CM

## 2021-06-17 DIAGNOSIS — D59.10 AUTOIMMUNE HEMOLYTIC ANEMIA (HCC): Primary | ICD-10-CM

## 2021-06-17 DIAGNOSIS — M25.561 ARTHRALGIA OF RIGHT LOWER LEG: ICD-10-CM

## 2021-06-17 DIAGNOSIS — Z79.52 LONG TERM SYSTEMIC STEROID USER: ICD-10-CM

## 2021-06-17 DIAGNOSIS — E83.111 IRON OVERLOAD DUE TO REPEATED RED BLOOD CELL TRANSFUSIONS: ICD-10-CM

## 2021-06-17 DIAGNOSIS — R73.9 HYPERGLYCEMIA: ICD-10-CM

## 2021-06-17 PROCEDURE — 99214 OFFICE O/P EST MOD 30 MIN: CPT | Performed by: INTERNAL MEDICINE

## 2021-06-17 NOTE — PROGRESS NOTES
[facility]  [address] [id number]  [address]1955, [phone]  06/17/21    Discussion:   In summary, this is a 79-year-old male history of autoimmune hemolytic anemia  He continues on prednisone adjusting 15-20 mg q a m  Gil Erasmo Hemoglobin is generally in the normal range  A few months ago we had a decrease in hemoglobin which responded to prednisone increase  He has no signs or symptoms suggestive of hyperglycemia  He has occasionally had blood sugar greater than 120, however  We reviewed signs and symptoms of hyperglycemia, he will call if these occur  Ferritin is normal   This had been elevated in the past due to iatrogenic hemochromatosis, transfusion related  He requires no chelating agents  He had knee surgery a few months ago  He has essentially recovered  He will be going golfing tomorrow  Essentially functions without restriction at this time  We will continue to monitor  Follow-up in 6 months  I discussed the above with the patient  The patient  voiced understanding and agreement   ______________________________________________________________________    Chief Complaint   Patient presents with    Follow-up       HPI:  Oncology History    No history exists  Interval History:  Clinically stable  ECOG-  1 - Symptomatic but completely ambulatory    Review of Systems   Constitutional: Negative for chills and fever  HENT: Negative for nosebleeds  Eyes: Negative for discharge  Respiratory: Negative for cough and shortness of breath  Cardiovascular: Negative for chest pain  Gastrointestinal: Negative for abdominal pain, constipation and diarrhea  Endocrine: Negative for polydipsia  Genitourinary: Negative for hematuria  Musculoskeletal: Negative for arthralgias  Skin: Negative for color change  Allergic/Immunologic: Negative for immunocompromised state     Neurological: Negative for dizziness and headaches  Hematological: Negative for adenopathy  Psychiatric/Behavioral: Negative for agitation         Past Medical History:   Diagnosis Date    Anemia 11/2/2016    Anxiety     Arthritis     Autoimmune hemolytic anemia (HCC)     Claustrophobia     DVT (deep venous thrombosis) (HCC)     GERD (gastroesophageal reflux disease)     Hearing loss, right     Hemolytic anemia (HCC)     History of transfusion     2018 - no adverse reaction    Hypertension     Palpitation     Portal vein thrombosis     PTSD (post-traumatic stress disorder)     Pulmonary emboli (HCC)     Tobacco abuse      Patient Active Problem List   Diagnosis    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    Hyperbilirubinemia    Symptomatic anemia    Hx of pulmonary embolus    Portal vein thrombosis    Elevated blood pressure reading without diagnosis of hypertension    Shortness of breath    Gastroesophageal reflux disease    Autoimmune hemolytic anemia    ARABELLA (acute kidney injury) ([facility] [address] )    Splenomegaly    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    Posttraumatic stress disorder    Essential hypertension    Glucose intolerance (impaired glucose tolerance)    Renal insufficiency    Auto immune neutropenia (HCC)    Osteoarthritis of knee    Pain in joint, lower leg    Pain in left knee    COVID-19    Thrombocytosis (HCC)    Primary localized osteoarthrosis of the knee, right       Current Outpatient Medications:     acetaminophen (TYLENOL) 325 mg tablet, Take 2 tablets (650 mg total) by mouth every 6 (six) hours as needed for mild pain, Disp:  , Rfl: 0    dabigatran etexilate (Pradaxa) 150 mg capsu, Take 1 capsule (150 mg total) by mouth 2 (two) times a day, Disp: 60 capsule, Rfl: 4    hydrochlorothiazide (HYDRODIURIL) 25 mg tablet, Take 1 tablet (25 mg total) by mouth daily, Disp: 30 tablet, Rfl: 5    metoprolol succinate (TOPROL-XL) 25 mg 24 hr tablet, Take 0 5 tablets (12 5 mg total) by mouth daily, Disp: 30 tablet, Rfl: 5    NON FORMULARY, Medicinal Marijuana prn, Disp: , Rfl:     pantoprazole (PROTONIX) 40 mg tablet, Take 1 tablet (40 mg total) by mouth daily, Disp: 90 tablet, Rfl: 3    potassium chloride (K-DUR,KLOR-CON) 20 mEq tablet, Take 1 tablet (20 mEq total) by mouth daily, Disp: 30 tablet, Rfl: 3    prazosin (MINIPRESS) 1 mg capsule, Take 1 mg by mouth daily at bedtime , Disp: , Rfl:     predniSONE 10 mg tablet, Take 1 tablet (10 mg total) by mouth daily, Disp: 90 tablet, Rfl: 1    predniSONE 2 5 mg tablet, Take 1 tablet (2 5 mg total) by mouth daily (Patient taking differently: Take 5 mg by mouth daily ), Disp: 30 tablet, Rfl: 0    sertraline (ZOLOFT) 100 mg tablet, sertraline 100 mg tablet, Disp: , Rfl:     VITAMIN D PO, Take 1,000 Units by mouth daily , Disp: , Rfl:     VITAMIN E PO, Take 1,000 Units by mouth daily , Disp: , Rfl:     ascorbic acid (VITAMIN C) 1000 MG tablet, Take 1 tablet (1,000 mg total) by mouth every 12 (twelve) hours for 6 doses, Disp: 6 tablet, Rfl: 0  Allergies   Allergen Reactions    Iodinated Diagnostic Agents Hives     Unsure if this is still an allergy     Past Surgical History:   Procedure Laterality Date    CHOLECYSTECTOMY  7/18/2017    COLONOSCOPY      ELBOW ARTHROPLASTY Left     bursectomy    JOINT REPLACEMENT  2/2/2021    KNEE SURGERY Right     meniscus tear    NC LAP,CHOLECYSTECTOMY/GRAPH N/A 12/23/2017    Procedure: CHOLECYSTECTOMY LAPAROSCOPIC with cholangiogram;  Surgeon: Kvng Yañez MD;  Location: [facility];  Service: General    NC REMOVAL SPLEEN, TOTAL N/A 5/18/2017    Procedure: LAPAROSCOPIC HAND ASSIST SPLENECTOMY;  Surgeon: Jhony Gloria MD;  Location: [facility];  Service: Surgical Oncology    NC TOTAL KNEE ARTHROPLASTY Right 2/2/2021    Procedure: ARTHROPLASTY KNEE TOTAL;  Surgeon: Porter Marie MD;  Location: [facility];  Service: Orthopedics    SHOULDER SURGERY Left     rotator cuff x4, reconstruction     Social History     Objective:  Vitals:    06/17/21 0821   BP: 134/76   BP Location: Left arm   Patient Position: Sitting   Pulse: 82   Resp: 18   Temp: (!) 97 4 °F (36 3 °C)   TempSrc: Tympanic   SpO2: 98%   Weight: 87 1 kg (192 lb)   Height: 5' 8" (1 727 m)     Physical Exam      Labs: I personally reviewed the labs and imaging pertinent to this patient care

## 2021-06-28 ENCOUNTER — APPOINTMENT (OUTPATIENT)
Dept: LAB | Facility: MEDICAL CENTER | Age: 66
End: 2021-06-28
Payer: MEDICARE

## 2021-06-28 LAB
FERRITIN SERPL-MCNC: 167 NG/ML (ref 8–388)
GLUCOSE SERPL-MCNC: 140 MG/DL (ref 65–140)

## 2021-06-28 PROCEDURE — 82728 ASSAY OF FERRITIN: CPT | Performed by: INTERNAL MEDICINE

## 2021-06-28 PROCEDURE — 36415 COLL VENOUS BLD VENIPUNCTURE: CPT | Performed by: INTERNAL MEDICINE

## 2021-06-28 PROCEDURE — 82947 ASSAY GLUCOSE BLOOD QUANT: CPT | Performed by: INTERNAL MEDICINE

## 2021-06-29 DIAGNOSIS — U07.1 COVID-19 VIRUS INFECTION: ICD-10-CM

## 2021-06-29 DIAGNOSIS — D59.10 AUTOIMMUNE HEMOLYTIC ANEMIA (HCC): Primary | ICD-10-CM

## 2021-06-29 RX ORDER — PREDNISONE 2.5 MG
5 TABLET ORAL DAILY
Qty: 60 TABLET | Refills: 0 | Status: SHIPPED | OUTPATIENT
Start: 2021-06-29 | End: 2021-06-29 | Stop reason: SDUPTHER

## 2021-06-30 RX ORDER — PREDNISONE 2.5 MG
5 TABLET ORAL DAILY
Qty: 60 TABLET | Refills: 1 | Status: SHIPPED | OUTPATIENT
Start: 2021-06-30 | End: 2021-07-29 | Stop reason: SDUPTHER

## 2021-07-19 ENCOUNTER — APPOINTMENT (OUTPATIENT)
Dept: LAB | Facility: MEDICAL CENTER | Age: 66
End: 2021-07-19
Payer: MEDICARE

## 2021-07-29 DIAGNOSIS — D59.10 AUTOIMMUNE HEMOLYTIC ANEMIA (HCC): ICD-10-CM

## 2021-07-29 DIAGNOSIS — U07.1 COVID-19 VIRUS INFECTION: ICD-10-CM

## 2021-07-29 RX ORDER — PREDNISONE 2.5 MG
5 TABLET ORAL DAILY
Qty: 60 TABLET | Refills: 2 | Status: SHIPPED | OUTPATIENT
Start: 2021-07-29 | End: 2021-10-21 | Stop reason: SDUPTHER

## 2021-08-10 ENCOUNTER — APPOINTMENT (OUTPATIENT)
Dept: LAB | Facility: MEDICAL CENTER | Age: 66
End: 2021-08-10
Payer: MEDICARE

## 2021-09-15 ENCOUNTER — APPOINTMENT (OUTPATIENT)
Dept: LAB | Facility: MEDICAL CENTER | Age: 66
End: 2021-09-15
Payer: MEDICARE

## 2021-09-15 DIAGNOSIS — D50.0 IRON DEFICIENCY ANEMIA SECONDARY TO BLOOD LOSS (CHRONIC): ICD-10-CM

## 2021-09-15 DIAGNOSIS — D59.10 AUTOIMMUNE HEMOLYTIC ANEMIA (HCC): ICD-10-CM

## 2021-09-15 LAB — FERRITIN SERPL-MCNC: 149 NG/ML (ref 8–388)

## 2021-09-15 PROCEDURE — 82728 ASSAY OF FERRITIN: CPT

## 2021-10-08 ENCOUNTER — OFFICE VISIT (OUTPATIENT)
Dept: FAMILY MEDICINE CLINIC | Facility: CLINIC | Age: 66
End: 2021-10-08
Payer: MEDICARE

## 2021-10-08 VITALS
SYSTOLIC BLOOD PRESSURE: 144 MMHG | WEIGHT: 192 LBS | TEMPERATURE: 97.2 F | HEART RATE: 82 BPM | DIASTOLIC BLOOD PRESSURE: 82 MMHG | HEIGHT: 68 IN | OXYGEN SATURATION: 99 % | BODY MASS INDEX: 29.1 KG/M2

## 2021-10-08 DIAGNOSIS — G89.29 CHRONIC BILATERAL LOW BACK PAIN WITH BILATERAL SCIATICA: ICD-10-CM

## 2021-10-08 DIAGNOSIS — T14.8XXA ABRASION: Primary | ICD-10-CM

## 2021-10-08 DIAGNOSIS — M54.41 CHRONIC BILATERAL LOW BACK PAIN WITH BILATERAL SCIATICA: ICD-10-CM

## 2021-10-08 DIAGNOSIS — M54.42 CHRONIC BILATERAL LOW BACK PAIN WITH BILATERAL SCIATICA: ICD-10-CM
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
## 2021-10-08 PROCEDURE — 99213 OFFICE O/P EST LOW 20 MIN: CPT | Performed by: FAMILY MEDICINE

## 2021-10-08 RX ORDER — CYCLOBENZAPRINE HYDROCHLORIDE 7.5 MG/1
7.5 TABLET, FILM COATED ORAL
Qty: 30 TABLET | Refills: 0 | Status: ON HOLD | OUTPATIENT
Start: 2021-10-08 | End: 2022-07-29 | Stop reason: ALTCHOICE

## 2021-10-14 ENCOUNTER — TELEPHONE (OUTPATIENT)
Dept: HEMATOLOGY ONCOLOGY | Facility: CLINIC | Age: 66
End: 2021-10-14

## 2021-10-18 ENCOUNTER — APPOINTMENT (OUTPATIENT)
Dept: LAB | Facility: MEDICAL CENTER | Age: 66
End: 2021-10-18
Payer: MEDICARE

## 2021-10-18 DIAGNOSIS — D50.0 IRON DEFICIENCY ANEMIA SECONDARY TO BLOOD LOSS (CHRONIC): ICD-10-CM

## 2021-10-18 DIAGNOSIS — D59.10 AUTOIMMUNE HEMOLYTIC ANEMIA (HCC): ICD-10-CM

## 2021-10-18 LAB — FERRITIN SERPL-MCNC: 184 NG/ML (ref 8–388)

## 2021-10-18 PROCEDURE — 82728 ASSAY OF FERRITIN: CPT

## 2021-10-21 ENCOUNTER — OFFICE VISIT (OUTPATIENT)
Dept: HEMATOLOGY ONCOLOGY | Facility: CLINIC | Age: 66
End: 2021-10-21
Payer: MEDICARE

## 2021-10-21 VITALS
SYSTOLIC BLOOD PRESSURE: 138 MMHG | DIASTOLIC BLOOD PRESSURE: 80 MMHG | HEART RATE: 79 BPM | TEMPERATURE: 98.5 F | OXYGEN SATURATION: 100 % | RESPIRATION RATE: 16 BRPM | WEIGHT: 191.5 LBS | BODY MASS INDEX: 29.02 KG/M2 | HEIGHT: 68 IN

## 2021-10-21 DIAGNOSIS — E83.111 IRON OVERLOAD DUE TO REPEATED RED BLOOD CELL TRANSFUSIONS: ICD-10-CM

## 2021-10-21 DIAGNOSIS — U07.1 COVID-19 VIRUS INFECTION: ICD-10-CM

## 2021-10-21 DIAGNOSIS — I81 PORTAL VEIN THROMBOSIS: ICD-10-CM

## 2021-10-21 DIAGNOSIS — D59.10 AUTOIMMUNE HEMOLYTIC ANEMIA (HCC): ICD-10-CM

## 2021-10-21 DIAGNOSIS — D59.11 HEMOLYTIC ANEMIA DUE TO WARM ANTIBODY (HCC): Primary | ICD-10-CM

## 2021-10-21 PROCEDURE — 99214 OFFICE O/P EST MOD 30 MIN: CPT | Performed by: NURSE PRACTITIONER

## 2021-10-21 RX ORDER — PREDNISONE 10 MG/1
10 TABLET ORAL DAILY
Qty: 90 TABLET | Refills: 1 | Status: SHIPPED | OUTPATIENT
Start: 2021-10-21 | End: 2021-11-05

## 2021-10-21 RX ORDER — PREDNISONE 2.5 MG
5 TABLET ORAL DAILY
Qty: 60 TABLET | Refills: 2 | Status: SHIPPED | OUTPATIENT
Start: 2021-10-21 | End: 2022-02-16 | Stop reason: SDUPTHER

## 2021-10-28 ENCOUNTER — PREP FOR PROCEDURE (OUTPATIENT)
Dept: OBGYN CLINIC | Facility: OTHER | Age: 66
End: 2021-10-28

## 2021-10-28 DIAGNOSIS — M17.12 PRIMARY OSTEOARTHRITIS OF LEFT KNEE: Primary | ICD-10-CM

## 2021-11-01 ENCOUNTER — ANESTHESIA EVENT (OUTPATIENT)
Dept: PERIOP | Facility: HOSPITAL | Age: 66
End: 2021-11-01
Payer: MEDICARE

## 2021-11-01 ENCOUNTER — APPOINTMENT (OUTPATIENT)
Dept: LAB | Facility: MEDICAL CENTER | Age: 66
End: 2021-11-01
Payer: MEDICARE

## 2021-11-01 DIAGNOSIS — Z01.818 OTHER SPECIFIED PRE-OPERATIVE EXAMINATION: ICD-10-CM

## 2021-11-01 LAB
ANION GAP SERPL CALCULATED.3IONS-SCNC: 5 MMOL/L (ref 4–13)
BASOPHILS # BLD AUTO: 0.12 THOUSANDS/ΜL (ref 0–0.1)
BASOPHILS NFR BLD AUTO: 1 % (ref 0–1)
BUN SERPL-MCNC: 22 MG/DL (ref 5–25)
CALCIUM SERPL-MCNC: 9.6 MG/DL (ref 8.3–10.1)
CHLORIDE SERPL-SCNC: 105 MMOL/L (ref 100–108)
CO2 SERPL-SCNC: 28 MMOL/L (ref 21–32)
CREAT SERPL-MCNC: 1.22 MG/DL (ref 0.6–1.3)
EOSINOPHIL # BLD AUTO: 1.03 THOUSAND/ΜL (ref 0–0.61)
EOSINOPHIL NFR BLD AUTO: 8 % (ref 0–6)
ERYTHROCYTE [DISTWIDTH] IN BLOOD BY AUTOMATED COUNT: 15 % (ref 11.6–15.1)
FERRITIN SERPL-MCNC: 252 NG/ML (ref 8–388)
GFR SERPL CREATININE-BSD FRML MDRD: 61 ML/MIN/1.73SQ M
GLUCOSE P FAST SERPL-MCNC: 97 MG/DL (ref 65–99)
HCT VFR BLD AUTO: 37.1 % (ref 36.5–49.3)
HGB BLD-MCNC: 12.9 G/DL (ref 12–17)
IMM GRANULOCYTES # BLD AUTO: 0.07 THOUSAND/UL (ref 0–0.2)
IMM GRANULOCYTES NFR BLD AUTO: 1 % (ref 0–2)
LYMPHOCYTES # BLD AUTO: 2.58 THOUSANDS/ΜL (ref 0.6–4.47)
LYMPHOCYTES NFR BLD AUTO: 19 % (ref 14–44)
MCH RBC QN AUTO: 41.5 PG (ref 26.8–34.3)
MCHC RBC AUTO-ENTMCNC: 34.8 G/DL (ref 31.4–37.4)
MCV RBC AUTO: 119 FL (ref 82–98)
MONOCYTES # BLD AUTO: 1.28 THOUSAND/ΜL (ref 0.17–1.22)
MONOCYTES NFR BLD AUTO: 10 % (ref 4–12)
NEUTROPHILS # BLD AUTO: 8.23 THOUSANDS/ΜL (ref 1.85–7.62)
NEUTS SEG NFR BLD AUTO: 61 % (ref 43–75)
NRBC BLD AUTO-RTO: 0 /100 WBCS
PLATELET # BLD AUTO: 507 THOUSANDS/UL (ref 149–390)
PMV BLD AUTO: 10.3 FL (ref 8.9–12.7)
POTASSIUM SERPL-SCNC: 3.6 MMOL/L (ref 3.5–5.3)
RBC # BLD AUTO: 3.11 MILLION/UL (ref 3.88–5.62)
SODIUM SERPL-SCNC: 138 MMOL/L (ref 136–145)
WBC # BLD AUTO: 13.31 THOUSAND/UL (ref 4.31–10.16)

## 2021-11-01 PROCEDURE — 36415 COLL VENOUS BLD VENIPUNCTURE: CPT | Performed by: NURSE PRACTITIONER

## 2021-11-01 PROCEDURE — 80048 BASIC METABOLIC PNL TOTAL CA: CPT | Performed by: NURSE PRACTITIONER

## 2021-11-01 PROCEDURE — 85025 COMPLETE CBC W/AUTO DIFF WBC: CPT | Performed by: NURSE PRACTITIONER

## 2021-11-01 PROCEDURE — 82728 ASSAY OF FERRITIN: CPT | Performed by: NURSE PRACTITIONER

## 2021-11-02 ENCOUNTER — CONSULT (OUTPATIENT)
Dept: FAMILY MEDICINE CLINIC | Facility: CLINIC | Age: 66
End: 2021-11-02
Payer: MEDICARE

## 2021-11-02 VITALS
TEMPERATURE: 97.1 F | BODY MASS INDEX: 29.98 KG/M2 | OXYGEN SATURATION: 99 % | RESPIRATION RATE: 16 BRPM | HEART RATE: 76 BPM | SYSTOLIC BLOOD PRESSURE: 146 MMHG | DIASTOLIC BLOOD PRESSURE: 86 MMHG | HEIGHT: 67 IN | WEIGHT: 191 LBS

## 2021-11-02 DIAGNOSIS — Z86.711 HX OF PULMONARY EMBOLUS: ICD-10-CM

## 2021-11-02 DIAGNOSIS — Z01.818 PREOPERATIVE CLEARANCE: Primary | ICD-10-CM

## 2021-11-02 DIAGNOSIS — I10 ESSENTIAL HYPERTENSION: ICD-10-CM

## 2021-11-02 DIAGNOSIS — M17.12 PRIMARY OSTEOARTHRITIS OF LEFT KNEE: ICD-10-CM

## 2021-11-02 DIAGNOSIS — Z23 NEEDS FLU SHOT: ICD-10-CM

## 2021-11-02 DIAGNOSIS — D59.10 AUTOIMMUNE HEMOLYTIC ANEMIA (HCC): ICD-10-CM

## 2021-11-02 PROCEDURE — 93000 ELECTROCARDIOGRAM COMPLETE: CPT | Performed by: FAMILY MEDICINE

## 2021-11-02 PROCEDURE — G0008 ADMIN INFLUENZA VIRUS VAC: HCPCS

## 2021-11-02 PROCEDURE — 99214 OFFICE O/P EST MOD 30 MIN: CPT | Performed by: FAMILY MEDICINE

## 2021-11-02 PROCEDURE — 90662 IIV NO PRSV INCREASED AG IM: CPT

## 2021-11-05 RX ORDER — PREDNISONE 10 MG/1
TABLET ORAL
COMMUNITY
End: 2022-07-25

## 2021-11-05 RX ORDER — ASPIRIN 81 MG/1
81 TABLET ORAL DAILY
COMMUNITY
End: 2021-11-19 | Stop reason: HOSPADM

## 2021-11-15 ENCOUNTER — APPOINTMENT (OUTPATIENT)
Dept: LAB | Facility: MEDICAL CENTER | Age: 66
End: 2021-11-15
Payer: MEDICARE

## 2021-11-15 DIAGNOSIS — M17.12 PRIMARY OSTEOARTHRITIS OF LEFT KNEE: ICD-10-CM

## 2021-11-15 LAB
ABO GROUP BLD: NORMAL
ALBUMIN SERPL BCP-MCNC: 4 G/DL (ref 3.5–5)
ALP SERPL-CCNC: 75 U/L (ref 46–116)
ALT SERPL W P-5'-P-CCNC: 24 U/L (ref 12–78)
ANION GAP SERPL CALCULATED.3IONS-SCNC: 5 MMOL/L (ref 4–13)
AST SERPL W P-5'-P-CCNC: 18 U/L (ref 5–45)
BILIRUB SERPL-MCNC: 1.47 MG/DL (ref 0.2–1)
BLD GP AB SCN SERPL QL: NEGATIVE
BUN SERPL-MCNC: 12 MG/DL (ref 5–25)
CALCIUM SERPL-MCNC: 10 MG/DL (ref 8.3–10.1)
CHLORIDE SERPL-SCNC: 105 MMOL/L (ref 100–108)
CO2 SERPL-SCNC: 29 MMOL/L (ref 21–32)
CREAT SERPL-MCNC: 1.27 MG/DL (ref 0.6–1.3)
GFR SERPL CREATININE-BSD FRML MDRD: 58 ML/MIN/1.73SQ M
GLUCOSE P FAST SERPL-MCNC: 109 MG/DL (ref 65–99)
POTASSIUM SERPL-SCNC: 3.3 MMOL/L (ref 3.5–5.3)
PROT SERPL-MCNC: 6.8 G/DL (ref 6.4–8.2)
RH BLD: POSITIVE
SODIUM SERPL-SCNC: 139 MMOL/L (ref 136–145)
SPECIMEN EXPIRATION DATE: NORMAL

## 2021-11-15 PROCEDURE — 36415 COLL VENOUS BLD VENIPUNCTURE: CPT

## 2021-11-15 PROCEDURE — 86900 BLOOD TYPING SEROLOGIC ABO: CPT

## 2021-11-15 PROCEDURE — 86850 RBC ANTIBODY SCREEN: CPT

## 2021-11-15 PROCEDURE — 86901 BLOOD TYPING SEROLOGIC RH(D): CPT

## 2021-11-15 PROCEDURE — 80053 COMPREHEN METABOLIC PANEL: CPT

## 2021-11-16 RX ORDER — ASPIRIN 325 MG
325 TABLET ORAL 2 TIMES DAILY
Status: DISCONTINUED | OUTPATIENT
Start: 2021-11-16 | End: 2021-11-16

## 2021-11-17 ENCOUNTER — APPOINTMENT (OUTPATIENT)
Dept: RADIOLOGY | Facility: HOSPITAL | Age: 66
End: 2021-11-17
Payer: MEDICARE

## 2021-11-17 ENCOUNTER — ANESTHESIA (OUTPATIENT)
Dept: PERIOP | Facility: HOSPITAL | Age: 66
End: 2021-11-17
Payer: MEDICARE

## 2021-11-17 ENCOUNTER — HOSPITAL ENCOUNTER (OUTPATIENT)
Facility: HOSPITAL | Age: 66
Setting detail: OUTPATIENT SURGERY
Discharge: HOME | End: 2021-11-19
Attending: ORTHOPAEDIC SURGERY | Admitting: ORTHOPAEDIC SURGERY
Payer: MEDICARE

## 2021-11-17 DIAGNOSIS — M17.11 PRIMARY LOCALIZED OSTEOARTHROSIS OF THE KNEE, RIGHT: Primary | ICD-10-CM

## 2021-11-17 PROBLEM — M17.12 PRIMARY OSTEOARTHRITIS OF LEFT KNEE: Status: ACTIVE | Noted: 2020-09-03

## 2021-11-17 PROCEDURE — C1776 JOINT DEVICE (IMPLANTABLE): HCPCS | Performed by: ORTHOPAEDIC SURGERY

## 2021-11-17 PROCEDURE — 97163 PT EVAL HIGH COMPLEX 45 MIN: CPT

## 2021-11-17 PROCEDURE — 73560 X-RAY EXAM OF KNEE 1 OR 2: CPT

## 2021-11-17 PROCEDURE — C1713 ANCHOR/SCREW BN/BN,TIS/BN: HCPCS | Performed by: ORTHOPAEDIC SURGERY

## 2021-11-17 DEVICE — ATTUNE KNEE SYSTEM TIBIAL BASE ROTATING PLATFORM SIZE 7 CEMENTED
Type: IMPLANTABLE DEVICE | Site: KNEE | Status: FUNCTIONAL
Brand: ATTUNE

## 2021-11-17 DEVICE — SMARTSET HIGH PERFORMANCE MV MEDIUM VISCOSITY BONE CEMENT 40G
Type: IMPLANTABLE DEVICE | Site: KNEE | Status: FUNCTIONAL
Brand: SMARTSET

## 2021-11-17 DEVICE — ATTUNE PATELLA MEDIALIZED ANATOMIC 38MM CEMENTED AOX
Type: IMPLANTABLE DEVICE | Site: KNEE | Status: FUNCTIONAL
Brand: ATTUNE

## 2021-11-17 DEVICE — ATTUNE KNEE SYSTEM TIBIAL INSERT ROTATING PLATFORM POSTERIOR STABILIZED 8 5MM AOX
Type: IMPLANTABLE DEVICE | Site: KNEE | Status: FUNCTIONAL
Brand: ATTUNE

## 2021-11-17 DEVICE — ATTUNE KNEE SYSTEM FEMORAL POSTERIOR STABILIZED SIZE 8 LEFT CEMENTED
Type: IMPLANTABLE DEVICE | Site: KNEE | Status: FUNCTIONAL
Brand: ATTUNE

## 2021-11-17 RX ORDER — POTASSIUM CHLORIDE 20 MEQ/1
20 TABLET, EXTENDED RELEASE ORAL DAILY
Status: DISCONTINUED | OUTPATIENT
Start: 2021-11-18 | End: 2021-11-19 | Stop reason: HOSPADM

## 2021-11-17 RX ORDER — METOPROLOL SUCCINATE 25 MG/1
12.5 TABLET, EXTENDED RELEASE ORAL DAILY
Status: DISCONTINUED | OUTPATIENT
Start: 2021-11-18 | End: 2021-11-19 | Stop reason: HOSPADM

## 2021-11-17 RX ORDER — CEFAZOLIN SODIUM 2 G/50ML
2000 SOLUTION INTRAVENOUS ONCE
Status: COMPLETED | OUTPATIENT
Start: 2021-11-17 | End: 2021-11-17

## 2021-11-17 RX ORDER — ROPIVACAINE HYDROCHLORIDE 5 MG/ML
INJECTION, SOLUTION EPIDURAL; INFILTRATION; PERINEURAL
Status: COMPLETED | OUTPATIENT
Start: 2021-11-17 | End: 2021-11-17

## 2021-11-17 RX ORDER — PANTOPRAZOLE SODIUM 40 MG/1
40 TABLET, DELAYED RELEASE ORAL
Status: DISCONTINUED | OUTPATIENT
Start: 2021-11-18 | End: 2021-11-19 | Stop reason: HOSPADM

## 2021-11-17 RX ORDER — SODIUM CHLORIDE 9 MG/ML
125 INJECTION, SOLUTION INTRAVENOUS CONTINUOUS
Status: DISCONTINUED | OUTPATIENT
Start: 2021-11-17 | End: 2021-11-17 | Stop reason: ALTCHOICE

## 2021-11-17 RX ORDER — FENTANYL CITRATE/PF 50 MCG/ML
25 SYRINGE (ML) INJECTION
Status: DISCONTINUED | OUTPATIENT
Start: 2021-11-17 | End: 2021-11-17 | Stop reason: HOSPADM

## 2021-11-17 RX ORDER — ACETAMINOPHEN 325 MG/1
650 TABLET ORAL EVERY 6 HOURS PRN
Status: DISCONTINUED | OUTPATIENT
Start: 2021-11-17 | End: 2021-11-19 | Stop reason: HOSPADM

## 2021-11-17 RX ORDER — FENTANYL CITRATE 50 UG/ML
INJECTION, SOLUTION INTRAMUSCULAR; INTRAVENOUS AS NEEDED
Status: DISCONTINUED | OUTPATIENT
Start: 2021-11-17 | End: 2021-11-17

## 2021-11-17 RX ORDER — CEFAZOLIN SODIUM 1 G/50ML
1000 SOLUTION INTRAVENOUS EVERY 8 HOURS
Status: COMPLETED | OUTPATIENT
Start: 2021-11-17 | End: 2021-11-17

## 2021-11-17 RX ORDER — DOCUSATE SODIUM 100 MG/1
100 CAPSULE, LIQUID FILLED ORAL 2 TIMES DAILY
Status: DISCONTINUED | OUTPATIENT
Start: 2021-11-17 | End: 2021-11-19 | Stop reason: HOSPADM

## 2021-11-17 RX ORDER — PRAZOSIN HYDROCHLORIDE 1 MG/1
1 CAPSULE ORAL
Status: DISCONTINUED | OUTPATIENT
Start: 2021-11-17 | End: 2021-11-19 | Stop reason: HOSPADM

## 2021-11-17 RX ORDER — GLYCOPYRROLATE 0.2 MG/ML
INJECTION INTRAMUSCULAR; INTRAVENOUS AS NEEDED
Status: DISCONTINUED | OUTPATIENT
Start: 2021-11-17 | End: 2021-11-17

## 2021-11-17 RX ORDER — HYDROMORPHONE HCL/PF 1 MG/ML
0.5 SYRINGE (ML) INJECTION EVERY 2 HOUR PRN
Status: DISCONTINUED | OUTPATIENT
Start: 2021-11-17 | End: 2021-11-19 | Stop reason: HOSPADM

## 2021-11-17 RX ORDER — SODIUM CHLORIDE, SODIUM LACTATE, POTASSIUM CHLORIDE, CALCIUM CHLORIDE 600; 310; 30; 20 MG/100ML; MG/100ML; MG/100ML; MG/100ML
100 INJECTION, SOLUTION INTRAVENOUS CONTINUOUS
Status: DISCONTINUED | OUTPATIENT
Start: 2021-11-17 | End: 2021-11-19 | Stop reason: HOSPADM

## 2021-11-17 RX ORDER — PROPOFOL 10 MG/ML
INJECTION, EMULSION INTRAVENOUS AS NEEDED
Status: DISCONTINUED | OUTPATIENT
Start: 2021-11-17 | End: 2021-11-17

## 2021-11-17 RX ORDER — MAGNESIUM HYDROXIDE 1200 MG/15ML
LIQUID ORAL AS NEEDED
Status: DISCONTINUED | OUTPATIENT
Start: 2021-11-17 | End: 2021-11-17 | Stop reason: HOSPADM

## 2021-11-17 RX ORDER — NEOSTIGMINE METHYLSULFATE 1 MG/ML
INJECTION INTRAVENOUS AS NEEDED
Status: DISCONTINUED | OUTPATIENT
Start: 2021-11-17 | End: 2021-11-17

## 2021-11-17 RX ORDER — ONDANSETRON 2 MG/ML
4 INJECTION INTRAMUSCULAR; INTRAVENOUS EVERY 8 HOURS PRN
Status: DISCONTINUED | OUTPATIENT
Start: 2021-11-17 | End: 2021-11-19 | Stop reason: HOSPADM

## 2021-11-17 RX ORDER — ROCURONIUM BROMIDE 10 MG/ML
INJECTION, SOLUTION INTRAVENOUS AS NEEDED
Status: DISCONTINUED | OUTPATIENT
Start: 2021-11-17 | End: 2021-11-17

## 2021-11-17 RX ORDER — CEFAZOLIN SODIUM 1 G/50ML
1000 SOLUTION INTRAVENOUS EVERY 8 HOURS
Status: DISCONTINUED | OUTPATIENT
Start: 2021-11-17 | End: 2021-11-17 | Stop reason: ALTCHOICE

## 2021-11-17 RX ORDER — SENNOSIDES 8.6 MG
1 TABLET ORAL DAILY
Status: DISCONTINUED | OUTPATIENT
Start: 2021-11-17 | End: 2021-11-19 | Stop reason: HOSPADM

## 2021-11-17 RX ORDER — OXYCODONE HYDROCHLORIDE 5 MG/1
5 TABLET ORAL EVERY 4 HOURS PRN
Status: DISCONTINUED | OUTPATIENT
Start: 2021-11-17 | End: 2021-11-19 | Stop reason: HOSPADM

## 2021-11-17 RX ORDER — ONDANSETRON 2 MG/ML
4 INJECTION INTRAMUSCULAR; INTRAVENOUS ONCE AS NEEDED
Status: DISCONTINUED | OUTPATIENT
Start: 2021-11-17 | End: 2021-11-17 | Stop reason: HOSPADM

## 2021-11-17 RX ORDER — FERROUS SULFATE 325(65) MG
325 TABLET ORAL
Status: DISCONTINUED | OUTPATIENT
Start: 2021-11-18 | End: 2021-11-17

## 2021-11-17 RX ORDER — ONDANSETRON 2 MG/ML
INJECTION INTRAMUSCULAR; INTRAVENOUS AS NEEDED
Status: DISCONTINUED | OUTPATIENT
Start: 2021-11-17 | End: 2021-11-17

## 2021-11-17 RX ORDER — HYDROCHLOROTHIAZIDE 25 MG/1
25 TABLET ORAL DAILY
Status: DISCONTINUED | OUTPATIENT
Start: 2021-11-18 | End: 2021-11-19 | Stop reason: HOSPADM

## 2021-11-17 RX ORDER — HYDROMORPHONE HCL 110MG/55ML
PATIENT CONTROLLED ANALGESIA SYRINGE INTRAVENOUS AS NEEDED
Status: DISCONTINUED | OUTPATIENT
Start: 2021-11-17 | End: 2021-11-17

## 2021-11-17 RX ORDER — MIDAZOLAM HYDROCHLORIDE 2 MG/2ML
INJECTION, SOLUTION INTRAMUSCULAR; INTRAVENOUS AS NEEDED
Status: DISCONTINUED | OUTPATIENT
Start: 2021-11-17 | End: 2021-11-17

## 2021-11-17 RX ORDER — PREDNISONE 1 MG/1
5 TABLET ORAL EVERY EVENING
Status: DISCONTINUED | OUTPATIENT
Start: 2021-11-17 | End: 2021-11-19 | Stop reason: HOSPADM

## 2021-11-17 RX ORDER — OXYCODONE HYDROCHLORIDE 5 MG/1
10 TABLET ORAL EVERY 4 HOURS PRN
Status: DISCONTINUED | OUTPATIENT
Start: 2021-11-17 | End: 2021-11-19 | Stop reason: HOSPADM

## 2021-11-17 RX ADMIN — Medication 0.5 MG: at 11:54

## 2021-11-17 RX ADMIN — OXYCODONE HYDROCHLORIDE 5 MG: 5 TABLET ORAL at 16:02

## 2021-11-17 RX ADMIN — HYDROMORPHONE HYDROCHLORIDE 0.5 MG: 1 INJECTION, SOLUTION INTRAMUSCULAR; INTRAVENOUS; SUBCUTANEOUS at 17:09

## 2021-11-17 RX ADMIN — CEFAZOLIN SODIUM 2000 MG: 2 SOLUTION INTRAVENOUS at 10:49

## 2021-11-17 RX ADMIN — SODIUM CHLORIDE: 0.9 INJECTION, SOLUTION INTRAVENOUS at 11:51

## 2021-11-17 RX ADMIN — SODIUM CHLORIDE, SODIUM LACTATE, POTASSIUM CHLORIDE, AND CALCIUM CHLORIDE 100 ML/HR: .6; .31; .03; .02 INJECTION, SOLUTION INTRAVENOUS at 23:07

## 2021-11-17 RX ADMIN — PREDNISONE 5 MG: 5 TABLET ORAL at 19:24

## 2021-11-17 RX ADMIN — SENNOSIDES 8.6 MG: 8.6 TABLET ORAL at 15:50

## 2021-11-17 RX ADMIN — GLYCOPYRROLATE 0.6 MG: 0.2 INJECTION, SOLUTION INTRAMUSCULAR; INTRAVENOUS at 12:31

## 2021-11-17 RX ADMIN — Medication 400 MG: at 12:50

## 2021-11-17 RX ADMIN — ONDANSETRON 4 MG: 2 INJECTION INTRAMUSCULAR; INTRAVENOUS at 12:16

## 2021-11-17 RX ADMIN — Medication 0.5 MG: at 11:46

## 2021-11-17 RX ADMIN — ROPIVACAINE HYDROCHLORIDE 30 ML: 5 INJECTION, SOLUTION EPIDURAL; INFILTRATION; PERINEURAL at 10:34

## 2021-11-17 RX ADMIN — MIDAZOLAM 4 MG: 1 INJECTION INTRAMUSCULAR; INTRAVENOUS at 10:32

## 2021-11-17 RX ADMIN — PROPOFOL 200 MG: 10 INJECTION, EMULSION INTRAVENOUS at 10:56

## 2021-11-17 RX ADMIN — FENTANYL CITRATE 100 MCG: 50 INJECTION INTRAMUSCULAR; INTRAVENOUS at 10:32

## 2021-11-17 RX ADMIN — SODIUM CHLORIDE, SODIUM LACTATE, POTASSIUM CHLORIDE, AND CALCIUM CHLORIDE 100 ML/HR: .6; .31; .03; .02 INJECTION, SOLUTION INTRAVENOUS at 15:49

## 2021-11-17 RX ADMIN — ROCURONIUM BROMIDE 50 MG: 50 INJECTION, SOLUTION INTRAVENOUS at 10:56

## 2021-11-17 RX ADMIN — ROCURONIUM BROMIDE 20 MG: 50 INJECTION, SOLUTION INTRAVENOUS at 11:36

## 2021-11-17 RX ADMIN — NEOSTIGMINE METHYLSULFATE 4 MG: 1 INJECTION INTRAVENOUS at 12:31

## 2021-11-17 RX ADMIN — DOCUSATE SODIUM 100 MG: 100 CAPSULE ORAL at 17:05

## 2021-11-17 RX ADMIN — OXYCODONE HYDROCHLORIDE 10 MG: 5 TABLET ORAL at 21:14

## 2021-11-17 RX ADMIN — LIDOCAINE HYDROCHLORIDE 100 MG: 20 INJECTION INTRAVENOUS at 10:56

## 2021-11-17 RX ADMIN — CEFAZOLIN SODIUM 1000 MG: 1 SOLUTION INTRAVENOUS at 15:50

## 2021-11-17 RX ADMIN — CEFAZOLIN SODIUM 1000 MG: 1 SOLUTION INTRAVENOUS at 23:07

## 2021-11-18 LAB
ANION GAP SERPL CALCULATED.3IONS-SCNC: 12 MMOL/L (ref 4–13)
BUN SERPL-MCNC: 20 MG/DL (ref 5–25)
CALCIUM SERPL-MCNC: 8.6 MG/DL (ref 8.3–10.1)
CHLORIDE SERPL-SCNC: 102 MMOL/L (ref 100–108)
CO2 SERPL-SCNC: 24 MMOL/L (ref 21–32)
CREAT SERPL-MCNC: 1.27 MG/DL (ref 0.6–1.3)
ERYTHROCYTE [DISTWIDTH] IN BLOOD BY AUTOMATED COUNT: 17.2 % (ref 11.6–15.1)
GFR SERPL CREATININE-BSD FRML MDRD: 58 ML/MIN/1.73SQ M
GLUCOSE P FAST SERPL-MCNC: 132 MG/DL (ref 65–99)
GLUCOSE SERPL-MCNC: 132 MG/DL (ref 65–140)
HCT VFR BLD AUTO: 30.1 % (ref 36.5–49.3)
HGB BLD-MCNC: 11.2 G/DL (ref 12–17)
MCH RBC QN AUTO: 45.5 PG (ref 26.8–34.3)
MCHC RBC AUTO-ENTMCNC: 37.2 G/DL (ref 31.4–37.4)
MCV RBC AUTO: 122 FL (ref 82–98)
PLATELET # BLD AUTO: 344 THOUSANDS/UL (ref 149–390)
PMV BLD AUTO: 9.9 FL (ref 8.9–12.7)
POTASSIUM SERPL-SCNC: 3.8 MMOL/L (ref 3.5–5.3)
RBC # BLD AUTO: 2.46 MILLION/UL (ref 3.88–5.62)
SODIUM SERPL-SCNC: 138 MMOL/L (ref 136–145)
WBC # BLD AUTO: 17.42 THOUSAND/UL (ref 4.31–10.16)

## 2021-11-18 PROCEDURE — 80048 BASIC METABOLIC PNL TOTAL CA: CPT | Performed by: PHYSICIAN ASSISTANT

## 2021-11-18 PROCEDURE — 85027 COMPLETE CBC AUTOMATED: CPT | Performed by: PHYSICIAN ASSISTANT

## 2021-11-18 PROCEDURE — 97530 THERAPEUTIC ACTIVITIES: CPT

## 2021-11-18 PROCEDURE — 97535 SELF CARE MNGMENT TRAINING: CPT

## 2021-11-18 PROCEDURE — 97167 OT EVAL HIGH COMPLEX 60 MIN: CPT

## 2021-11-18 PROCEDURE — 97116 GAIT TRAINING THERAPY: CPT

## 2021-11-18 PROCEDURE — 97110 THERAPEUTIC EXERCISES: CPT

## 2021-11-18 RX ORDER — OXYCODONE HYDROCHLORIDE 5 MG/1
5 TABLET ORAL EVERY 4 HOURS PRN
Refills: 0
Start: 2021-11-18 | End: 2021-11-19

## 2021-11-18 RX ORDER — ACETAMINOPHEN 325 MG/1
650 TABLET ORAL EVERY 6 HOURS PRN
Refills: 0
Start: 2021-11-18 | End: 2022-04-14 | Stop reason: SDUPTHER

## 2021-11-18 RX ORDER — DOCUSATE SODIUM 100 MG/1
100 CAPSULE, LIQUID FILLED ORAL 2 TIMES DAILY
Refills: 0
Start: 2021-11-18 | End: 2022-04-14 | Stop reason: ALTCHOICE

## 2021-11-18 RX ORDER — DABIGATRAN ETEXILATE 75 MG/1
75 CAPSULE, COATED PELLETS ORAL EVERY 12 HOURS SCHEDULED
Refills: 0 | Status: ON HOLD
Start: 2021-11-18 | End: 2022-08-04 | Stop reason: SDUPTHER

## 2021-11-18 RX ORDER — DABIGATRAN ETEXILATE 75 MG/1
75 CAPSULE, COATED PELLETS ORAL EVERY 12 HOURS SCHEDULED
Status: DISCONTINUED | OUTPATIENT
Start: 2021-11-18 | End: 2021-11-19 | Stop reason: HOSPADM

## 2021-11-18 RX ADMIN — DABIGATRAN ETEXILATE MESYLATE 75 MG: 75 CAPSULE ORAL at 17:32

## 2021-11-18 RX ADMIN — OXYCODONE HYDROCHLORIDE 10 MG: 5 TABLET ORAL at 13:38

## 2021-11-18 RX ADMIN — PREDNISONE 5 MG: 5 TABLET ORAL at 17:18

## 2021-11-18 RX ADMIN — ACETAMINOPHEN 650 MG: 325 TABLET, FILM COATED ORAL at 02:48

## 2021-11-18 RX ADMIN — DOCUSATE SODIUM 100 MG: 100 CAPSULE ORAL at 08:39

## 2021-11-18 RX ADMIN — PREDNISONE 15 MG: 5 TABLET ORAL at 08:39

## 2021-11-18 RX ADMIN — ACETAMINOPHEN 650 MG: 325 TABLET, FILM COATED ORAL at 08:43

## 2021-11-18 RX ADMIN — OXYCODONE HYDROCHLORIDE 5 MG: 5 TABLET ORAL at 08:43

## 2021-11-18 RX ADMIN — POTASSIUM CHLORIDE 20 MEQ: 1500 TABLET, EXTENDED RELEASE ORAL at 08:39

## 2021-11-18 RX ADMIN — SENNOSIDES 8.6 MG: 8.6 TABLET ORAL at 08:39

## 2021-11-18 RX ADMIN — ACETAMINOPHEN 650 MG: 325 TABLET, FILM COATED ORAL at 17:32

## 2021-11-18 RX ADMIN — PANTOPRAZOLE SODIUM 40 MG: 40 TABLET, DELAYED RELEASE ORAL at 06:03

## 2021-11-18 RX ADMIN — METOPROLOL SUCCINATE 12.5 MG: 25 TABLET, EXTENDED RELEASE ORAL at 08:39

## 2021-11-18 RX ADMIN — HYDROCHLOROTHIAZIDE 25 MG: 25 TABLET ORAL at 08:39

## 2021-11-18 RX ADMIN — ACETAMINOPHEN 650 MG: 325 TABLET, FILM COATED ORAL at 23:30

## 2021-11-19 VITALS
OXYGEN SATURATION: 95 % | RESPIRATION RATE: 18 BRPM | HEART RATE: 96 BPM | SYSTOLIC BLOOD PRESSURE: 150 MMHG | BODY MASS INDEX: 30.17 KG/M2 | WEIGHT: 192.24 LBS | HEIGHT: 67 IN | DIASTOLIC BLOOD PRESSURE: 78 MMHG | TEMPERATURE: 99.1 F

## 2021-11-19 LAB
ANION GAP SERPL CALCULATED.3IONS-SCNC: 12 MMOL/L (ref 4–13)
BUN SERPL-MCNC: 18 MG/DL (ref 5–25)
CALCIUM SERPL-MCNC: 9.2 MG/DL (ref 8.3–10.1)
CHLORIDE SERPL-SCNC: 100 MMOL/L (ref 100–108)
CO2 SERPL-SCNC: 26 MMOL/L (ref 21–32)
CREAT SERPL-MCNC: 1.26 MG/DL (ref 0.6–1.3)
GFR SERPL CREATININE-BSD FRML MDRD: 59 ML/MIN/1.73SQ M
GLUCOSE SERPL-MCNC: 105 MG/DL (ref 65–140)
POTASSIUM SERPL-SCNC: 3.9 MMOL/L (ref 3.5–5.3)
SODIUM SERPL-SCNC: 138 MMOL/L (ref 136–145)

## 2021-11-19 PROCEDURE — 97530 THERAPEUTIC ACTIVITIES: CPT

## 2021-11-19 PROCEDURE — 97535 SELF CARE MNGMENT TRAINING: CPT

## 2021-11-19 PROCEDURE — 80048 BASIC METABOLIC PNL TOTAL CA: CPT | Performed by: PHYSICIAN ASSISTANT

## 2021-11-19 PROCEDURE — 97116 GAIT TRAINING THERAPY: CPT

## 2021-11-19 PROCEDURE — 97110 THERAPEUTIC EXERCISES: CPT

## 2021-11-19 RX ORDER — OXYCODONE HYDROCHLORIDE 5 MG/1
5 TABLET ORAL EVERY 4 HOURS PRN
Refills: 0
Start: 2021-11-19 | End: 2021-11-29

## 2021-11-19 RX ADMIN — METOPROLOL SUCCINATE 12.5 MG: 25 TABLET, EXTENDED RELEASE ORAL at 08:57

## 2021-11-19 RX ADMIN — OXYCODONE HYDROCHLORIDE 5 MG: 5 TABLET ORAL at 08:57

## 2021-11-19 RX ADMIN — OXYCODONE HYDROCHLORIDE 5 MG: 5 TABLET ORAL at 13:22

## 2021-11-19 RX ADMIN — POTASSIUM CHLORIDE 20 MEQ: 1500 TABLET, EXTENDED RELEASE ORAL at 08:57

## 2021-11-19 RX ADMIN — HYDROCHLOROTHIAZIDE 25 MG: 25 TABLET ORAL at 08:57

## 2021-11-19 RX ADMIN — PANTOPRAZOLE SODIUM 40 MG: 40 TABLET, DELAYED RELEASE ORAL at 05:43

## 2021-11-19 RX ADMIN — PREDNISONE 15 MG: 5 TABLET ORAL at 08:57

## 2021-11-19 RX ADMIN — DABIGATRAN ETEXILATE MESYLATE 75 MG: 75 CAPSULE ORAL at 09:23

## 2021-11-22 ENCOUNTER — APPOINTMENT (OUTPATIENT)
Dept: LAB | Facility: MEDICAL CENTER | Age: 66
End: 2021-11-22
Payer: MEDICARE

## 2021-11-23 ENCOUNTER — TRANSITIONAL CARE MANAGEMENT (OUTPATIENT)
Dept: FAMILY MEDICINE CLINIC | Facility: CLINIC | Age: 66
End: 2021-11-23

## 2021-11-29 ENCOUNTER — APPOINTMENT (OUTPATIENT)
Dept: LAB | Facility: MEDICAL CENTER | Age: 66
End: 2021-11-29
Payer: MEDICARE

## 2021-11-29 DIAGNOSIS — E83.111 IRON OVERLOAD DUE TO REPEATED RED BLOOD CELL TRANSFUSIONS: ICD-10-CM

## 2021-11-29 DIAGNOSIS — D59.11 HEMOLYTIC ANEMIA DUE TO WARM ANTIBODY (HCC): ICD-10-CM

## 2021-11-29 DIAGNOSIS — I81 PORTAL VEIN THROMBOSIS: ICD-10-CM

## 2021-11-29 LAB
BASOPHILS # BLD AUTO: 0.01 THOUSANDS/ΜL (ref 0–0.1)
BASOPHILS NFR BLD AUTO: 0 % (ref 0–1)
EOSINOPHIL # BLD AUTO: 0.06 THOUSAND/ΜL (ref 0–0.61)
EOSINOPHIL NFR BLD AUTO: 0 % (ref 0–6)
ERYTHROCYTE [DISTWIDTH] IN BLOOD BY AUTOMATED COUNT: 16.9 % (ref 11.6–15.1)
FERRITIN SERPL-MCNC: 541 NG/ML (ref 8–388)
HCT VFR BLD AUTO: 32.3 % (ref 36.5–49.3)
HGB BLD-MCNC: 10.8 G/DL (ref 12–17)
IMM GRANULOCYTES # BLD AUTO: 0.1 THOUSAND/UL (ref 0–0.2)
IMM GRANULOCYTES NFR BLD AUTO: 1 % (ref 0–2)
LYMPHOCYTES # BLD AUTO: 1.47 THOUSANDS/ΜL (ref 0.6–4.47)
LYMPHOCYTES NFR BLD AUTO: 11 % (ref 14–44)
MCH RBC QN AUTO: 40.3 PG (ref 26.8–34.3)
MCHC RBC AUTO-ENTMCNC: 33.4 G/DL (ref 31.4–37.4)
MCV RBC AUTO: 121 FL (ref 82–98)
MONOCYTES # BLD AUTO: 1.27 THOUSAND/ΜL (ref 0.17–1.22)
MONOCYTES NFR BLD AUTO: 9 % (ref 4–12)
NEUTROPHILS # BLD AUTO: 10.6 THOUSANDS/ΜL (ref 1.85–7.62)
NEUTS SEG NFR BLD AUTO: 79 % (ref 43–75)
NRBC BLD AUTO-RTO: 1 /100 WBCS
PLATELET # BLD AUTO: 863 THOUSANDS/UL (ref 149–390)
PMV BLD AUTO: 9.6 FL (ref 8.9–12.7)
RBC # BLD AUTO: 2.68 MILLION/UL (ref 3.88–5.62)
WBC # BLD AUTO: 13.51 THOUSAND/UL (ref 4.31–10.16)

## 2021-11-29 PROCEDURE — 82728 ASSAY OF FERRITIN: CPT

## 2021-11-29 PROCEDURE — 85025 COMPLETE CBC W/AUTO DIFF WBC: CPT

## 2021-11-29 PROCEDURE — 36415 COLL VENOUS BLD VENIPUNCTURE: CPT

## 2021-12-01 ENCOUNTER — OFFICE VISIT (OUTPATIENT)
Dept: PHYSICAL THERAPY | Facility: CLINIC | Age: 66
End: 2021-12-01
Payer: MEDICARE

## 2021-12-01 DIAGNOSIS — Z96.652 H/O TOTAL KNEE REPLACEMENT, LEFT: Primary | ICD-10-CM

## 2021-12-01 PROCEDURE — 97161 PT EVAL LOW COMPLEX 20 MIN: CPT | Performed by: PHYSICAL THERAPIST

## 2021-12-01 PROCEDURE — 97110 THERAPEUTIC EXERCISES: CPT | Performed by: PHYSICAL THERAPIST

## 2021-12-06 ENCOUNTER — APPOINTMENT (OUTPATIENT)
Dept: LAB | Facility: MEDICAL CENTER | Age: 66
End: 2021-12-06
Payer: MEDICARE

## 2021-12-06 DIAGNOSIS — D59.10 AUTOIMMUNE HEMOLYTIC ANEMIA (HCC): ICD-10-CM

## 2021-12-06 DIAGNOSIS — D50.0 IRON DEFICIENCY ANEMIA SECONDARY TO BLOOD LOSS (CHRONIC): ICD-10-CM

## 2021-12-06 LAB — FERRITIN SERPL-MCNC: 401 NG/ML (ref 8–388)

## 2021-12-06 PROCEDURE — 82728 ASSAY OF FERRITIN: CPT

## 2021-12-07 ENCOUNTER — APPOINTMENT (OUTPATIENT)
Dept: PHYSICAL THERAPY | Facility: CLINIC | Age: 66
End: 2021-12-07
Payer: MEDICARE

## 2021-12-08 ENCOUNTER — OFFICE VISIT (OUTPATIENT)
Dept: PHYSICAL THERAPY | Facility: CLINIC | Age: 66
End: 2021-12-08
Payer: MEDICARE

## 2021-12-08 DIAGNOSIS — Z96.652 H/O TOTAL KNEE REPLACEMENT, LEFT: Primary | ICD-10-CM

## 2021-12-08 PROCEDURE — 97110 THERAPEUTIC EXERCISES: CPT | Performed by: PHYSICAL THERAPIST

## 2021-12-08 PROCEDURE — 97140 MANUAL THERAPY 1/> REGIONS: CPT | Performed by: PHYSICAL THERAPIST

## 2021-12-15 ENCOUNTER — APPOINTMENT (OUTPATIENT)
Dept: PHYSICAL THERAPY | Facility: CLINIC | Age: 66
End: 2021-12-15
Payer: MEDICARE

## 2021-12-20 ENCOUNTER — OFFICE VISIT (OUTPATIENT)
Dept: FAMILY MEDICINE CLINIC | Facility: CLINIC | Age: 66
End: 2021-12-20
Payer: MEDICARE

## 2021-12-20 VITALS
HEIGHT: 68 IN | OXYGEN SATURATION: 98 % | BODY MASS INDEX: 29.31 KG/M2 | WEIGHT: 193.4 LBS | RESPIRATION RATE: 16 BRPM | HEART RATE: 92 BPM | TEMPERATURE: 96.9 F | DIASTOLIC BLOOD PRESSURE: 84 MMHG | SYSTOLIC BLOOD PRESSURE: 140 MMHG

## 2021-12-20 DIAGNOSIS — K21.00 GASTROESOPHAGEAL REFLUX DISEASE WITH ESOPHAGITIS WITHOUT HEMORRHAGE: ICD-10-CM

## 2021-12-20 DIAGNOSIS — Z86.711 HX OF PULMONARY EMBOLUS: ICD-10-CM

## 2021-12-20 DIAGNOSIS — I10 ESSENTIAL HYPERTENSION: Primary | ICD-10-CM

## 2021-12-20 DIAGNOSIS — D59.10 AUTOIMMUNE HEMOLYTIC ANEMIA (HCC): ICD-10-CM

## 2021-12-20 DIAGNOSIS — E78.00 HYPERCHOLESTEROLEMIA: ICD-10-CM

## 2021-12-20 DIAGNOSIS — Z00.00 MEDICARE ANNUAL WELLNESS VISIT, INITIAL: ICD-10-CM

## 2021-12-20 DIAGNOSIS — Z12.5 SCREENING PSA (PROSTATE SPECIFIC ANTIGEN): ICD-10-CM

## 2021-12-20 PROCEDURE — 99214 OFFICE O/P EST MOD 30 MIN: CPT | Performed by: FAMILY MEDICINE

## 2021-12-20 PROCEDURE — G0438 PPPS, INITIAL VISIT: HCPCS | Performed by: FAMILY MEDICINE

## 2021-12-21 ENCOUNTER — APPOINTMENT (OUTPATIENT)
Dept: LAB | Facility: MEDICAL CENTER | Age: 66
End: 2021-12-21
Payer: MEDICARE

## 2021-12-21 DIAGNOSIS — Z12.5 SCREENING PSA (PROSTATE SPECIFIC ANTIGEN): ICD-10-CM

## 2021-12-21 DIAGNOSIS — I10 ESSENTIAL HYPERTENSION: ICD-10-CM

## 2021-12-21 DIAGNOSIS — E78.00 HYPERCHOLESTEROLEMIA: ICD-10-CM

## 2021-12-21 LAB
CHOLEST SERPL-MCNC: 236 MG/DL
HDLC SERPL-MCNC: 96 MG/DL
LDLC SERPL CALC-MCNC: 119 MG/DL (ref 0–100)
NONHDLC SERPL-MCNC: 140 MG/DL
PSA SERPL-MCNC: 0.9 NG/ML (ref 0–4)
TRIGL SERPL-MCNC: 106 MG/DL
TSH SERPL DL<=0.05 MIU/L-ACNC: 2.39 UIU/ML (ref 0.36–3.74)

## 2021-12-21 PROCEDURE — 84443 ASSAY THYROID STIM HORMONE: CPT

## 2021-12-21 PROCEDURE — 80061 LIPID PANEL: CPT

## 2021-12-21 PROCEDURE — G0103 PSA SCREENING: HCPCS

## 2021-12-29 ENCOUNTER — PATIENT MESSAGE (OUTPATIENT)
Dept: HEMATOLOGY ONCOLOGY | Facility: CLINIC | Age: 66
End: 2021-12-29

## 2022-01-01 DIAGNOSIS — C43.71 MALIGNANT MELANOMA OF LEG, RIGHT (HCC): Primary | ICD-10-CM

## 2022-01-01 RX ORDER — ALPRAZOLAM 0.5 MG/1
0.5 TABLET ORAL 2 TIMES DAILY
Qty: 2 TABLET | Refills: 0 | Status: SHIPPED | OUTPATIENT
Start: 2022-01-01 | End: 2022-01-01 | Stop reason: SDUPTHER

## 2022-01-01 RX ORDER — ACETAMINOPHEN 325 MG/1
650 TABLET ORAL ONCE
Status: CANCELLED | OUTPATIENT
Start: 2022-01-01

## 2022-01-01 RX ORDER — SODIUM CHLORIDE 9 MG/ML
20 INJECTION, SOLUTION INTRAVENOUS ONCE
Status: CANCELLED | OUTPATIENT
Start: 2022-01-01

## 2022-01-31 ENCOUNTER — APPOINTMENT (OUTPATIENT)
Dept: LAB | Facility: MEDICAL CENTER | Age: 67
End: 2022-01-31
Payer: MEDICARE

## 2022-02-15 ENCOUNTER — APPOINTMENT (OUTPATIENT)
Dept: LAB | Facility: MEDICAL CENTER | Age: 67
End: 2022-02-15
Payer: MEDICARE

## 2022-02-16 ENCOUNTER — TELEPHONE (OUTPATIENT)
Dept: HEMATOLOGY ONCOLOGY | Facility: CLINIC | Age: 67
End: 2022-02-16

## 2022-02-16 DIAGNOSIS — D59.10 AUTOIMMUNE HEMOLYTIC ANEMIA (HCC): ICD-10-CM

## 2022-02-16 DIAGNOSIS — U07.1 COVID-19 VIRUS INFECTION: ICD-10-CM

## 2022-02-16 RX ORDER — PREDNISONE 2.5 MG
5 TABLET ORAL DAILY
Qty: 60 TABLET | Refills: 2 | Status: SHIPPED | OUTPATIENT
Start: 2022-02-16 | End: 2022-07-25

## 2022-02-16 NOTE — TELEPHONE ENCOUNTER
Spoke with pt to let him know that his refill for prednisone has been sent to the [address] [place] [address] E in [place]  I also let him know that his labs are stable and there's nothing that we are worried about regarding them  Pt verbalized understanding

## 2022-02-16 NOTE — TELEPHONE ENCOUNTER
Patient is calling in indicating that he saw his labs and saw that his levels were down and wants to know should he be concerned?        Medication Refill     Who is Calling  Patient   Medication predniSONE 2 5 mg tablet     How many pills left    Preferred Pharmacy / Address [facility], [address] [place] [address]    Who is your Physician? David Amador    Call back number [phone]   Relevant Information

## 2022-03-21 ENCOUNTER — APPOINTMENT (OUTPATIENT)
Dept: LAB | Facility: MEDICAL CENTER | Age: 67
End: 2022-03-21
Payer: MEDICARE

## 2022-04-12 ENCOUNTER — APPOINTMENT (OUTPATIENT)
Dept: LAB | Facility: MEDICAL CENTER | Age: 67
End: 2022-04-12
Payer: MEDICARE

## 2022-04-14 ENCOUNTER — OFFICE VISIT (OUTPATIENT)
Dept: HEMATOLOGY ONCOLOGY | Facility: CLINIC | Age: 67
End: 2022-04-14
Payer: MEDICARE

## 2022-04-14 VITALS
DIASTOLIC BLOOD PRESSURE: 86 MMHG | SYSTOLIC BLOOD PRESSURE: 128 MMHG | OXYGEN SATURATION: 97 % | HEART RATE: 85 BPM | TEMPERATURE: 96.4 F | BODY MASS INDEX: 31.23 KG/M2 | HEIGHT: 67 IN | WEIGHT: 199 LBS | RESPIRATION RATE: 16 BRPM

## 2022-04-14 DIAGNOSIS — R06.02 SHORTNESS OF BREATH: ICD-10-CM

## 2022-04-14 DIAGNOSIS — D59.10 AUTOIMMUNE HEMOLYTIC ANEMIA (HCC): ICD-10-CM

## 2022-04-14 DIAGNOSIS — R10.9 ABDOMINAL PRESSURE: Primary | ICD-10-CM

## 2022-04-14 DIAGNOSIS — R10.13 EPIGASTRIC PRESSURE: ICD-10-CM

## 2022-04-14 DIAGNOSIS — I26.99 OTHER ACUTE PULMONARY EMBOLISM WITHOUT ACUTE COR PULMONALE (HCC): ICD-10-CM

## 2022-04-14 PROBLEM — T14.8XXA ABRASION: Status: RESOLVED | Noted: 2021-10-08 | Resolved: 2022-04-14

## 2022-04-14 PROCEDURE — 99214 OFFICE O/P EST MOD 30 MIN: CPT | Performed by: NURSE PRACTITIONER

## 2022-04-14 NOTE — PROGRESS NOTES
[address] [facility]ATOLOGY [facility] [address] [address] [phone]  Hematology Ambulatory Follow-Up  Ian Sim, 1955, [id number]  4/14/2022    Assessment/Plan:    1  Autoimmune hemolytic anemia (HCC)  2  Shortness of breath  3  Other acute pulmonary embolism without acute cor pulmonale (HCC)  4  Abdominal pressure  5  Epigastric pressure   Patient is a 13-year-old male with a history of autoimmune hemolytic anemia currently on prednisone 15 mg p o  daily  He tolerates this without difficulty and adjust accordingly to his hemoglobin  He is also on Pradaxa and baby aspirin for history of portal vein thrombosis  His most recent CBC reveals an elevated white blood cell count at 10 42, hemoglobin 11 7, , and platelets 635  He is status post splenectomy likely contributing to his leukocytosis and thrombocytosis  Overall he is doing well and able to function without restriction  He does report approximately a 3 week history of intermittent heart palpitations abdominal and epigastric pressure  He reports feeling short of breath when bending over or walking longer distances  Patient does have a history of pulmonary embolism in the past   We will request a CT of the chest abdomen pelvis for further evaluation, differential diagnoses include hiatal hernia, AAA, pulmonary embolism  We will call him with the results and manage as indicated  Otherwise I will plan to see him in 6 months with repeat blood work  Patient verbalized understanding and is in agreement with the plan  - CT chest abdomen pelvis w contrast; Future  - CBC; Standing  - Comprehensive metabolic panel; Future  - CBC    Barrier(s) to care: None  The patient is able to self care      [address] [place] [address]    Interval history: clinically stable    Review of Systems   Constitutional: Negative for activity change, appetite change, fatigue, fever and unexpected weight change  Respiratory: Positive for shortness of breath  Negative for cough  Cardiovascular: Negative for chest pain and leg swelling  Gastrointestinal: Positive for abdominal pain (Epigastric pressure)  Negative for constipation, diarrhea and nausea  Endocrine: Negative for cold intolerance and heat intolerance  Musculoskeletal: Negative for arthralgias and myalgias  Skin: Negative  Neurological: Negative for dizziness, weakness and headaches  Hematological: Negative for adenopathy  Does not bruise/bleed easily       Past Medical History:   Diagnosis Date    Anemia 11/2/2016    Anxiety     Arthritis     Autoimmune hemolytic anemia (HCC)     Claustrophobia     COVID 12/2020    DVT (deep venous thrombosis) (HCC)     GERD (gastroesophageal reflux disease)     Hearing loss, right     Hemolytic anemia (HCC)     History of transfusion     2018 - no adverse reaction    Hypertension     Palpitation     Portal vein thrombosis     PTSD (post-traumatic stress disorder)     Pulmonary emboli (HCC)     Tobacco abuse        Past Surgical History:   Procedure Laterality Date    CATARACT EXTRACTION Bilateral     CHOLECYSTECTOMY  7/18/2017    COLONOSCOPY      ELBOW ARTHROPLASTY Left     bursectomy    JOINT REPLACEMENT Right 2/2/2021    knee    KNEE SURGERY Right     meniscus tear    MI LAP,CHOLECYSTECTOMY/GRAPH N/A 12/23/2017    Procedure: CHOLECYSTECTOMY LAPAROSCOPIC with cholangiogram;  Surgeon: Guille Acosta MD;  Location: [facility];  Service: General    MI REMOVAL SPLEEN, TOTAL N/A 5/18/2017    Procedure: LAPAROSCOPIC HAND ASSIST SPLENECTOMY;  Surgeon: Kala Justin MD;  Location: [facility];  Service: Surgical Oncology    MI TOTAL KNEE ARTHROPLASTY Right 2/2/2021    Procedure: ARTHROPLASTY KNEE TOTAL;  Surgeon: Tacho Buitrago MD;  Location: [facility];  Service: Orthopedics    MI TOTAL KNEE ARTHROPLASTY Left 11/17/2021 Procedure: TOTAL KNEE REPLACEMENT;  Surgeon: Fer Velez MD;  [facility];  Service: Orthopedics    SHOULDER SURGERY Left     rotator cuff x4, reconstruction       Current Outpatient Medications:     acetaminophen (TYLENOL) 325 mg tablet, Take 2 tablets (650 mg total) by mouth every 6 (six) hours as needed for mild pain, Disp:  , Rfl: 0    cyclobenzaprine (FEXMID) 7 5 MG tablet, Take 1 tablet (7 5 mg total) by mouth daily at bedtime, Disp: 30 tablet, Rfl: 0    dabigatran etexilate (PRADAXA) 75 mg capsule, Take 1 capsule (75 mg total) by mouth every 12 (twelve) hours, Disp: , Rfl: 0    hydrochlorothiazide (HYDRODIURIL) 25 mg tablet, Take 1 tablet (25 mg total) by mouth daily, Disp: 30 tablet, Rfl: 5    metoprolol succinate (TOPROL-XL) 25 mg 24 hr tablet, Take 0 5 tablets (12 5 mg total) by mouth daily, Disp: 30 tablet, Rfl: 5    pantoprazole (PROTONIX) 40 mg tablet, Take 1 tablet (40 mg total) by mouth daily, Disp: 90 tablet, Rfl: 3    potassium chloride (K-DUR,KLOR-CON) 20 mEq tablet, Take 1 tablet (20 mEq total) by mouth daily, Disp: 30 tablet, Rfl: 3    prazosin (MINIPRESS) 1 mg capsule, Take 1 mg by mouth daily at bedtime , Disp: , Rfl:     predniSONE 2 5 mg tablet, Take 2 tablets (5 mg total) by mouth daily, Disp: 60 tablet, Rfl: 2    VITAMIN D PO, Take 1,000 Units by mouth daily , Disp: , Rfl:     ascorbic acid (VITAMIN C) 1000 MG tablet, Take 1 tablet (1,000 mg total) by mouth every 12 (twelve) hours for 6 doses (Patient taking differently: Take 1,000 mg by mouth daily Every other day ), Disp: 6 tablet, Rfl: 0    predniSONE 10 MG (21) TBPK, Take by mouth (Patient not taking: Reported on 12/20/2021 ), Disp: , Rfl:     Allergies   Allergen Reactions    Iodinated Diagnostic Agents Hives     Unsure if this is still an allergy       Objective:  /86 (BP Location: Left arm, Patient Position: Sitting, Cuff Size: Standard)   Pulse 85   Temp (!) 96 4 °F (35 8 °C) (Temporal)   Resp 16 Ht 5' 7" (1 702 m)   Wt 90 3 kg (199 lb)   SpO2 97%   BMI 31 17 kg/m²    Physical Exam  Vitals reviewed  Constitutional:       Appearance: Normal appearance  He is well-developed  HENT:      Head: Normocephalic and atraumatic  Eyes:      Pupils: Pupils are equal, round, and reactive to light  Pulmonary:      Effort: Pulmonary effort is normal  No respiratory distress  Breath sounds: Normal breath sounds  Abdominal:      General: Bowel sounds are normal       Palpations: Abdomen is soft  Musculoskeletal:         General: Normal range of motion  Cervical back: Normal range of motion  Lymphadenopathy:      Cervical: No cervical adenopathy  Skin:     General: Skin is dry  Neurological:      Mental Status: He is alert and oriented to person, place, and time  Psychiatric:         Behavior: Behavior normal        Result Review  Labs:   Ancillary Orders on 04/01/2022   Component Date Value Ref Range Status    WBC 04/12/2022 10 42* 4 31 - 10 16 Thousand/uL Final    RBC 04/12/2022 2 56* 3 88 - 5 62 Million/uL Final    Hemoglobin 04/12/2022 11 7* 12 0 - 17 0 g/dL Final    Hematocrit 04/12/2022 31 3* 36 5 - 49 3 % Final    MCV 04/12/2022 122* 82 - 98 fL Final    MCH 04/12/2022 45 7* 26 8 - 34 3 pg Final    MCHC 04/12/2022 37 4  31 4 - 37 4 g/dL Final    RDW 04/12/2022 20 1* 11 6 - 15 1 % Final    Platelets 67/60/1515 498* 149 - 390 Thousands/uL Final    MPV 04/12/2022 10 0  8 9 - 12 7 fL Final   Ancillary Orders on 03/18/2022   Component Date Value Ref Range Status    WBC 03/21/2022 13 80* 4 31 - 10 16 Thousand/uL Final    RBC 03/21/2022 2 71* 3 88 - 5 62 Million/uL Final    Corrected for cold agglutinin    Hemoglobin 03/21/2022 12 0  12 0 - 17 0 g/dL Final    Hematocrit 03/21/2022 32 8* 36 5 - 49 3 % Final    MCV 03/21/2022 121* 82 - 98 fL Final    MCH 03/21/2022 44 3* 26 8 - 34 3 pg Final    MCHC 03/21/2022 36 6  31 4 - 37 4 g/dL Final    Corrected for cold agglutinin    RDW 03/21/2022 19 3* 11 6 - 15 1 % Final    Platelets 39/49/9197 524* 149 - 390 Thousands/uL Final    MPV 03/21/2022 10 1  8 9 - 12 7 fL Final     Please note: This report has been generated by a voice recognition software system  Therefore there may be syntax, spelling, and/or grammatical errors  Please call if you have any questions

## 2022-04-22 ENCOUNTER — TELEPHONE (OUTPATIENT)
Dept: HEMATOLOGY ONCOLOGY | Facility: CLINIC | Age: 67
End: 2022-04-22

## 2022-04-22 NOTE — TELEPHONE ENCOUNTER
CALL RETURN FORM   Reason for patient call? Patient has a ct on 4/30 and wants to know what he has to do to get the barium and when  Patient's primary oncologist? Dr Carlos Martinez    Name of person the patient was calling for? Jaelyn   Any additional information to add, if applicable? Best number to call is [phone]   Informed patient that the message will be forwarded to the team and someone will get back to them as soon as possible    Did you relay this information to the patient?   yes

## 2022-04-22 NOTE — TELEPHONE ENCOUNTER
Spoke with patient to let him know that he can get barium at any hospital campus  Pt verbalized understanding

## 2022-04-25 DIAGNOSIS — D59.10 AUTOIMMUNE HEMOLYTIC ANEMIA (HCC): Primary | ICD-10-CM

## 2022-04-28 ENCOUNTER — APPOINTMENT (OUTPATIENT)
Dept: LAB | Facility: MEDICAL CENTER | Age: 67
End: 2022-04-28
Payer: MEDICARE

## 2022-04-28 DIAGNOSIS — D59.10 AUTOIMMUNE HEMOLYTIC ANEMIA (HCC): ICD-10-CM

## 2022-04-28 LAB
ALBUMIN SERPL BCP-MCNC: 3.8 G/DL (ref 3.5–5)
ALP SERPL-CCNC: 61 U/L (ref 46–116)
ALT SERPL W P-5'-P-CCNC: 34 U/L (ref 12–78)
ANION GAP SERPL CALCULATED.3IONS-SCNC: 7 MMOL/L (ref 4–13)
AST SERPL W P-5'-P-CCNC: 28 U/L (ref 5–45)
BILIRUB SERPL-MCNC: 1.78 MG/DL (ref 0.2–1)
BUN SERPL-MCNC: 24 MG/DL (ref 5–25)
CALCIUM SERPL-MCNC: 10 MG/DL (ref 8.3–10.1)
CHLORIDE SERPL-SCNC: 106 MMOL/L (ref 100–108)
CO2 SERPL-SCNC: 27 MMOL/L (ref 21–32)
CREAT SERPL-MCNC: 1.34 MG/DL (ref 0.6–1.3)
GFR SERPL CREATININE-BSD FRML MDRD: 54 ML/MIN/1.73SQ M
GLUCOSE P FAST SERPL-MCNC: 120 MG/DL (ref 65–99)
POTASSIUM SERPL-SCNC: 3.4 MMOL/L (ref 3.5–5.3)
PROT SERPL-MCNC: 6.8 G/DL (ref 6.4–8.2)
SODIUM SERPL-SCNC: 140 MMOL/L (ref 136–145)

## 2022-04-28 PROCEDURE — 80053 COMPREHEN METABOLIC PANEL: CPT

## 2022-04-30 ENCOUNTER — HOSPITAL ENCOUNTER (OUTPATIENT)
Dept: CT IMAGING | Facility: HOSPITAL | Age: 67
Discharge: HOME | End: 2022-04-30
Payer: MEDICARE

## 2022-04-30 DIAGNOSIS — R10.13 EPIGASTRIC PRESSURE: ICD-10-CM

## 2022-04-30 DIAGNOSIS — I26.99 OTHER ACUTE PULMONARY EMBOLISM WITHOUT ACUTE COR PULMONALE (HCC): ICD-10-CM

## 2022-04-30 DIAGNOSIS — R06.02 SHORTNESS OF BREATH: ICD-10-CM

## 2022-04-30 DIAGNOSIS — R10.9 ABDOMINAL PRESSURE: ICD-10-CM

## 2022-04-30 DIAGNOSIS — D59.10 AUTOIMMUNE HEMOLYTIC ANEMIA (HCC): ICD-10-CM

## 2022-04-30 PROCEDURE — G1004 CDSM NDSC: HCPCS

## 2022-04-30 PROCEDURE — 71250 CT THORAX DX C-: CPT

## 2022-04-30 PROCEDURE — 74176 CT ABD & PELVIS W/O CONTRAST: CPT

## 2022-05-02 ENCOUNTER — TELEPHONE (OUTPATIENT)
Dept: HEMATOLOGY ONCOLOGY | Facility: CLINIC | Age: 67
End: 2022-05-02

## 2022-05-02 NOTE — TELEPHONE ENCOUNTER
Called and left message to review CT scan results  Instructed to call back through the hope line if he had any questions

## 2022-05-16 ENCOUNTER — TELEPHONE (OUTPATIENT)
Dept: HEMATOLOGY ONCOLOGY | Facility: CLINIC | Age: 67
End: 2022-05-16

## 2022-05-16 DIAGNOSIS — D22.9 ATYPICAL MOLE: Primary | ICD-10-CM

## 2022-05-16 DIAGNOSIS — R23.3 BLEEDING SKIN MOLE: ICD-10-CM

## 2022-05-16 DIAGNOSIS — D22.9 BLEEDING SKIN MOLE: ICD-10-CM

## 2022-05-16 NOTE — TELEPHONE ENCOUNTER
Returned call to patient to let him know that I will tell Dr Flores Less that he did his part for  medical mariajuana  Pt also states that he has this black raised spot on his leg that he has had for years, but it just started bothering in about 6 months ago  He stated that last night it started bleeding  I told him that I will put in a derm referral and someone will reach out to him to schedule that  Pt verbalized understanding

## 2022-05-16 NOTE — TELEPHONE ENCOUNTER
CALL RETURN FORM   Reason for patient call? Patient is calling in requesting a call back regarding a referral as well as renewal for his medical renetta     Patient's primary oncologist? [facility]     Name of person the patient was calling for?  Erica     Any additional information to add, if applicable? n/a   Informed patient that the message will be forwarded to the team and someone will get back to them as soon as possible    Did you relay this information to the patient?  yes, patient can be reached back at [phone]

## 2022-05-31 ENCOUNTER — CONSULT (OUTPATIENT)
Dept: DERMATOLOGY | Facility: CLINIC | Age: 67
End: 2022-05-31
Payer: MEDICARE

## 2022-05-31 VITALS — WEIGHT: 195 LBS | TEMPERATURE: 98.4 F | BODY MASS INDEX: 30.61 KG/M2 | HEIGHT: 67 IN

## 2022-05-31 DIAGNOSIS — D48.5 NEOPLASM OF UNCERTAIN BEHAVIOR OF SKIN: ICD-10-CM

## 2022-05-31 PROCEDURE — 88305 TISSUE EXAM BY PATHOLOGIST: CPT | Performed by: STUDENT IN AN ORGANIZED HEALTH CARE EDUCATION/TRAINING PROGRAM

## 2022-05-31 PROCEDURE — 99204 OFFICE O/P NEW MOD 45 MIN: CPT | Performed by: DERMATOLOGY

## 2022-05-31 PROCEDURE — 88342 IMHCHEM/IMCYTCHM 1ST ANTB: CPT | Performed by: STUDENT IN AN ORGANIZED HEALTH CARE EDUCATION/TRAINING PROGRAM

## 2022-05-31 PROCEDURE — 11102 TANGNTL BX SKIN SINGLE LES: CPT | Performed by: DERMATOLOGY

## 2022-05-31 PROCEDURE — 88344 IMHCHEM/IMCYTCHM EA MLT ANTB: CPT | Performed by: STUDENT IN AN ORGANIZED HEALTH CARE EDUCATION/TRAINING PROGRAM

## 2022-05-31 NOTE — PATIENT INSTRUCTIONS
NEOPLASM OF UNCERTAIN BEHAVIOR OF SKIN      Assessment and Plan:  I have discussed with the patient that a sample of skin via a "skin biopsy would be potentially helpful to further make a specific diagnosis under the microscope  Based on a thorough discussion of this condition and the management approach to it (including a comprehensive discussion of the known risks, side effects and potential benefits of treatment), the patient (family) agrees to implement the following specific plan:    Procedure:  Skin Biopsy  After a thorough discussion of treatment options and risk/benefits/alternatives (including but not limited to local pain, scarring, dyspigmentation, blistering, possible superinfection, and inability to confirm a diagnosis via histopathology), verbal and written consent were obtained and portion of the rash was biopsied for tissue sample  See below for consent that was obtained from patient and subsequent Procedure Note  INFORMED CONSENT DISCUSSION AND POST-OPERATIVE INSTRUCTIONS FOR PATIENT    I   RATIONALE FOR PROCEDURE  I understand that a skin biopsy allows the Dermatologist to test a lesion or rash under the microscope to obtain a diagnosis  It usually involves numbing the area with numbing medication and removing a small piece of skin; sometimes the area will be closed with sutures  In this specific procedure, sutures are not usually needed  If any sutures are placed, then they are usually need to be removed in 2 weeks or less  I understand that my Dermatologist recommends that a skin "shave" biopsy be performed today  A local anesthetic, similar to the kind that a dentist uses when filling a cavity, will be injected with a very small needle into the skin area to be sampled  The injected skin and tissue underneath "will go to sleep and become numb so no pain should be felt afterwards    An instrument shaped like a tiny "razor blade" (shave biopsy instrument) will be used to cut a small piece of tissue and skin from the area so that a sample of tissue can be taken and examined more closely under the microscope  A slight amount of bleeding will occur, but it will be stopped with direct pressure and a pressure bandage and any other appropriate methods  I understands that a scar will form where the wound was created  Surgical ointment will be applied to help protect the wound  Sutures are not usually needed  II   RISKS AND POTENTIAL COMPLICATIONS   I understand the risks and potential complications of a skin biopsy include but are not limited to the following:  Bleeding  Infection  Pain  Scar/keloid  Skin discoloration  Incomplete Removal  Recurrence  Nerve Damage/Numbness/Loss of Function  Allergic Reaction to Anesthesia  Biopsies are diagnostic procedures and based on findings additional treatment or evaluation may be required  Loss or destruction of specimen resulting in no additional findings    My Dermatologist has explained to me the nature of the condition, the nature of the procedure, and the benefits to be reasonably expected compared with alternative approaches  My Dermatologist has discussed the likelihood of major risks or complications of this procedure including the specific risks listed above, such as bleeding, infection, and scarring/keloid  I understand that a scar is expected after this procedure  I understand that my physician cannot predict if the scar will form a "keloid," which extends beyond the borders of the wound that is created  A keloid is a thick, painful, and bumpy scar  A keloid can be difficult to treat, as it does not always respond well to therapy, which includes injecting cortisone directly into the keloid every few weeks  While this usually reduces the pain and size of the scar, it does not eliminate it  I understand that photographs may be taken before and after the procedure    These will be maintained as part of the medical providers confidential records and may not be made available to me  I further authorize the medical provider to use the photographs for teaching purposes or to illustrate scientific papers, books, or lectures if in his/her judgment, medical research, education, or science may benefit from its use  I have had an opportunity to fully inquire about the risks and benefits of this procedure and its alternatives  I have been given ample time and opportunity to ask questions and to seek a second opinion if I wished to do so  I acknowledge that there have specifically been no guarantees as to the cosmetic results from the procedure  I am aware that with any procedure there is always the possibility of an unexpected complication  III  POST-PROCEDURAL CARE (WHAT YOU WILL NEED TO DO "AFTER THE BIOPSY" TO OPTIMIZE HEALING)    Keep the area clean and dry  Try NOT to remove the bandage or get it wet for the first 24 hours  Gently clean the area and apply surgical ointment (such as Vaseline petrolatum ointment, which is available "over the counter" and not a prescription) to the biopsy site for up to 2 weeks straight  This acts to protect the wound from the outside world  Sutures are not usually placed in this procedure  If any sutures were placed, return for suture removal as instructed (generally 1 week for the face, 2 weeks for the body)  Take Acetaminophen (Tylenol) for discomfort, if no contraindications  Ibuprofen or aspirin could make bleeding worse  Call our office immediately for signs of infection: fever, chills, increased redness, warmth, tenderness, discomfort/pain, or pus or foul smell coming from the wound  WHAT TO DO IF THERE IS ANY BLEEDING? If a small amount of bleeding is noticed, place a clean cloth over the area and apply firm pressure for ten minutes  Check the wound after 10 minutes of direct pressure    If bleeding persists, try one more time for an additional 10 minutes of direct pressure on the area   If the bleeding becomes heavier or does not stop after the second attempt, or if you have any other questions about this procedure, then please call your [facility]'s Dermatologist by calling [phone] (SKIN)  I hereby acknowledge that I have reviewed and verified the site with my Dermatologist and have requested and authorized my Dermatologist to proceed with the procedure

## 2022-05-31 NOTE — PROGRESS NOTES
[facility] Note     Patient Name: Justine Cano  Encounter Date: 5/31/2022     Have you been cared for by a [facility] in the last 3 years and, if so, which one? No    · Have you traveled outside of the [facility] in the past 3 months? No     May we call your Preferred Phone number to discuss your specific medical information? Yes     May we leave a detailed message that includes your specific medical information? Yes      Today's Chief Concerns:   Concern #1:  Area of concern on right leg       Past Medical History:  Have you personally ever had or currently have any of the following? · Skin cancer (such as Melanoma, Basal Cell Carcinoma, Squamous Cell Carcinoma? (If Yes, please provide more detail)- No  · Eczema: No  · Psoriasis: No  · HIV/AIDS: No  · Hepatitis B or C: No  · Tuberculosis: No  · Systemic Immunosuppression such as Diabetes, Biologic or Immunotherapy, Chemotherapy, Organ Transplantation, Bone Marrow Transplantation (If YES, please provide more detail): YES, chemo   · Radiation Treatment (If YES, please provide more detail): No  · Any other major medical conditions/concerns? (If Yes, which types)- YES, [facility]    Social History:    What is/was your primary occupation? Retired     What are your hobbies/past-times? golf    Family history:    Have any of your "first degree relatives" (parent, brother, sister, or child) had any of the following       · Skin cancer such as Melanoma or Merkel Cell Carcinoma or Pancreatic Cancer? YES, father - may of had skin cancer  · Eczema, Asthma, Hay Fever or Seasonal Allergies: No  · Psoriasis or Psoriatic Arthritis: No  · Do any other medical conditions seem to run in your family? If Yes, what condition and which relatives?   No    Current Medications (update all dermatological medications before printing patient's AVS):    Current Outpatient Medications:     acetaminophen (TYLENOL) 325 mg tablet, Take 2 tablets (650 mg total) by mouth every 6 (six) hours as needed for mild pain, Disp:  , Rfl: 0    cyclobenzaprine (FEXMID) 7 5 MG tablet, Take 1 tablet (7 5 mg total) by mouth daily at bedtime, Disp: 30 tablet, Rfl: 0    hydrochlorothiazide (HYDRODIURIL) 25 mg tablet, Take 1 tablet (25 mg total) by mouth daily, Disp: 30 tablet, Rfl: 5    metoprolol succinate (TOPROL-XL) 25 mg 24 hr tablet, Take 0 5 tablets (12 5 mg total) by mouth daily, Disp: 30 tablet, Rfl: 5    pantoprazole (PROTONIX) 40 mg tablet, Take 1 tablet (40 mg total) by mouth daily, Disp: 90 tablet, Rfl: 3    potassium chloride (K-DUR,KLOR-CON) 20 mEq tablet, Take 1 tablet (20 mEq total) by mouth daily, Disp: 30 tablet, Rfl: 3    prazosin (MINIPRESS) 1 mg capsule, Take 1 mg by mouth daily at bedtime , Disp: , Rfl:     predniSONE 10 MG (21) TBPK, Take by mouth, Disp: , Rfl:     predniSONE 2 5 mg tablet, Take 2 tablets (5 mg total) by mouth daily, Disp: 60 tablet, Rfl: 2    VITAMIN D PO, Take 1,000 Units by mouth daily , Disp: , Rfl:     ascorbic acid (VITAMIN C) 1000 MG tablet, Take 1 tablet (1,000 mg total) by mouth every 12 (twelve) hours for 6 doses (Patient taking differently: Take 1,000 mg by mouth daily Every other day ), Disp: 6 tablet, Rfl: 0    dabigatran etexilate (PRADAXA) 75 mg capsule, Take 1 capsule (75 mg total) by mouth every 12 (twelve) hours (Patient not taking: Reported on 5/31/2022), Disp: , Rfl: 0      Review of Systems/System Alerts:  Have you recently had or currently have any of the following? If YES, what are you doing for the problem? · Fever, chills or unintended weight loss: No  · Sudden loss or change in your vision: No  · Nausea, vomiting or blood in your stool: No  · Painful or swollen joints: No  · Wheezing or cough: No  · Changing mole or non-healing wound: No  · Nosebleeds: No  · Excessive sweating: No  · Easy or prolonged bleeding?   YES, pradaxa  · Over the last 2 weeks, how often have you been bothered by the following problems? · Taking little interest or pleasure in doing things: 1 - Not at All  · Feeling down, depressed, or hopeless: 1 - Not at All  · Rapid heartbeat with epinephrine:  No  · FEMALES ONLY:    · Are you pregnant or planning to become pregnant? N/A  · Are you currently or planning to be nursing or breast feeding? N/A  · Any known allergies? YES, see below   Allergies   Allergen Reactions    Iodinated Diagnostic Agents Hives     Unsure if this is still an allergy   ·       PHYSICAL EXAM:       Was a chaperone (Derm Clinical Assistant) present throughout the entire Physical Exam? Yes    o       CONSTITUTIONAL (Height, Weight, and Temperature must be recorded):   Vitals:    05/31/22 0743   Temp: 98 4 °F (36 9 °C)   TempSrc: Temporal   Weight: 88 5 kg (195 lb)   Height: 5' 7" (1 702 m)         PSYCH: Normal mood and affect  EYES: Normal conjunctiva  ENT: Normal lips and oral mucosa  CARDIOVASCULAR: No edema  RESPIRATORY: Normal respirations  HEME/LYMPH/IMMUNO:  No regional lymphadenopathy except as noted below in ASSESSMENT AND PLAN BY DIAGNOSIS    SKIN:  FOCUSED ORGAN SYSTEM EXAM  Right Leg, Foot, Toes Normal except as noted below in Assessment   Left Leg, Foot, Toes Normal except as noted below in Assessment        ASSESSMENT AND PLAN BY DIAGNOSIS:    History of Present Condition:     Duration:  How long has this been an issue for you?    o  3 years started to be an issue one year ago    Location Affected:  Where on the body is this affecting you?    o  right leg    Quality:  Is there any bleeding, pain, itch, burning/irritation, or redness associated with the skin lesion? o  bleeding and discharge    Severity:  Describe any bleeding, pain, itch, burning/irritation, or redness on a scale of 1 to 10 (with 10 being the worst)  o  n/a   Timing:  Does this condition seem to be there pretty constantly or do you notice it more at specific times throughout the day?     o  constant  Context:  Have you ever noticed that this condition seems to be associated with specific activities you do?    o  denies    Modifying Factors:    o Anything that seems to make the condition worse?    -  denies   o What have you tried to do to make the condition better?    -  denies    Associated Signs and Symptoms:  Does this skin lesion seem to be associated with any of the following:  o  denies     NEOPLASM OF UNCERTAIN BEHAVIOR OF SKIN    Physical Exam:   (Anatomic Location); (Size and Morphological Description); (Differential Diagnosis):  o Specimen A: right leg; skin; tangential biopsy; 75year old male with a 1 5 x 1 5 cm crusted purplish papule for 3 years; Differential diagnosis:  carcinoma versus melanocytic neoplasm   Pertinent Positives:   Pertinent Negatives: Additional History of Present Condition:  Patient noticed area 3 years ago and became more of an issue one year ago  Patient states bleeds at times  Assessment and Plan:   I have discussed with the patient that a sample of skin via a "skin biopsy would be potentially helpful to further make a specific diagnosis under the microscope   Based on a thorough discussion of this condition and the management approach to it (including a comprehensive discussion of the known risks, side effects and potential benefits of treatment), the patient (family) agrees to implement the following specific plan:    o Procedure:  Skin Biopsy  After a thorough discussion of treatment options and risk/benefits/alternatives (including but not limited to local pain, scarring, dyspigmentation, blistering, possible superinfection, and inability to confirm a diagnosis via histopathology), verbal and written consent were obtained and portion of the rash was biopsied for tissue sample  See below for consent that was obtained from patient and subsequent Procedure Note      PROCEDURE TANGENTIAL (SHAVE) BIOPSY NOTE:     Performing Physician: Dr Tin Mccormack, supervised by Dr Negin Roach Anatomic Location; Clinical Description with size (cm); Pre-Op Diagnosis:   Specimen A: right leg; skin; tangential biopsy; 75year old male with a 1 5 x 1 5 cm crusted purplish papule for 3 years; Differential diagnosis:  carcinoma versus melanocytic neoplasm   Post-op diagnosis: Same      Local anesthesia: 1% xylocaine with epi       Topical anesthesia: None     Hemostasis: Electrocautery and aluminium chloride       After obtaining informed consent  at which time there was a discussion about the purpose of biopsy  and low risks of infection and bleeding  The area was prepped and draped in the usual fashion  Anesthesia was obtained with 1% lidocaine with epinephrine  A shave biopsy to an appropriate sampling depth was obtained by Scoop The resulting wound was covered with surgical ointment and bandaged appropriately  The patient tolerated the procedure well without complications and was without signs of functional compromise  Specimen has been sent for review by Dermatopathology  Standard post-procedure care has been explained and has been included in written form within the patient's copy of Informed Consent  INFORMED CONSENT DISCUSSION AND POST-OPERATIVE INSTRUCTIONS FOR PATIENT    I   RATIONALE FOR PROCEDURE  I understand that a skin biopsy allows the Dermatologist to test a lesion or rash under the microscope to obtain a diagnosis  It usually involves numbing the area with numbing medication and removing a small piece of skin; sometimes the area will be closed with sutures  In this specific procedure, sutures are not usually needed  If any sutures are placed, then they are usually need to be removed in 2 weeks or less  I understand that my Dermatologist recommends that a skin "shave" biopsy be performed today    A local anesthetic, similar to the kind that a dentist uses when filling a cavity, will be injected with a very small needle into the skin area to be sampled  The injected skin and tissue underneath "will go to sleep and become numb so no pain should be felt afterwards  An instrument shaped like a tiny "razor blade" (shave biopsy instrument) will be used to cut a small piece of tissue and skin from the area so that a sample of tissue can be taken and examined more closely under the microscope  A slight amount of bleeding will occur, but it will be stopped with direct pressure and a pressure bandage and any other appropriate methods  I understands that a scar will form where the wound was created  Surgical ointment will be applied to help protect the wound  Sutures are not usually needed  II   RISKS AND POTENTIAL COMPLICATIONS   I understand the risks and potential complications of a skin biopsy include but are not limited to the following:   Bleeding   Infection   Pain   Scar/keloid   Skin discoloration   Incomplete Removal   Recurrence   Nerve Damage/Numbness/Loss of Function   Allergic Reaction to Anesthesia   Biopsies are diagnostic procedures and based on findings additional treatment or evaluation may be required   Loss or destruction of specimen resulting in no additional findings    My Dermatologist has explained to me the nature of the condition, the nature of the procedure, and the benefits to be reasonably expected compared with alternative approaches  My Dermatologist has discussed the likelihood of major risks or complications of this procedure including the specific risks listed above, such as bleeding, infection, and scarring/keloid  I understand that a scar is expected after this procedure  I understand that my physician cannot predict if the scar will form a "keloid," which extends beyond the borders of the wound that is created  A keloid is a thick, painful, and bumpy scar    A keloid can be difficult to treat, as it does not always respond well to therapy, which includes injecting cortisone directly into the keloid every few weeks  While this usually reduces the pain and size of the scar, it does not eliminate it  I understand that photographs may be taken before and after the procedure  These will be maintained as part of the medical providers confidential records and may not be made available to me  I further authorize the medical provider to use the photographs for teaching purposes or to illustrate scientific papers, books, or lectures if in his/her judgment, medical research, education, or science may benefit from its use  I have had an opportunity to fully inquire about the risks and benefits of this procedure and its alternatives  I have been given ample time and opportunity to ask questions and to seek a second opinion if I wished to do so  I acknowledge that there have specifically been no guarantees as to the cosmetic results from the procedure  I am aware that with any procedure there is always the possibility of an unexpected complication  III  POST-PROCEDURAL CARE (WHAT YOU WILL NEED TO DO "AFTER THE BIOPSY" TO OPTIMIZE HEALING)     Keep the area clean and dry  Try NOT to remove the bandage or get it wet for the first 24 hours   Gently clean the area and apply surgical ointment (such as Vaseline petrolatum ointment, which is available "over the counter" and not a prescription) to the biopsy site for up to 2 weeks straight  This acts to protect the wound from the outside world   Sutures are not usually placed in this procedure  If any sutures were placed, return for suture removal as instructed (generally 1 week for the face, 2 weeks for the body)   Take Acetaminophen (Tylenol) for discomfort, if no contraindications  Ibuprofen or aspirin could make bleeding worse   Call our office immediately for signs of infection: fever, chills, increased redness, warmth, tenderness, discomfort/pain, or pus or foul smell coming from the wound  WHAT TO DO IF THERE IS ANY BLEEDING?   If a small amount of bleeding is noticed, place a clean cloth over the area and apply firm pressure for ten minutes  Check the wound after 10 minutes of direct pressure  If bleeding persists, try one more time for an additional 10 minutes of direct pressure on the area  If the bleeding becomes heavier or does not stop after the second attempt, or if you have any other questions about this procedure, then please call your [facility]'s Dermatologist by calling [phone] (SKIN)  I hereby acknowledge that I have reviewed and verified the site with my Dermatologist and have requested and authorized my Dermatologist to proceed with the procedure        Beauford Peabody, DO    Scribe Attestation    I,:  Natalie Keane am acting as a scribe while in the presence of the attending physician :       I,:  Judy Romo MD personally performed the services described in this documentation    as scribed in my presence :

## 2022-06-01 ENCOUNTER — RA CDI HCC (OUTPATIENT)
Dept: OTHER | Facility: HOSPITAL | Age: 67
End: 2022-06-01

## 2022-06-01 NOTE — PROGRESS NOTES
D47 3    [facility] 75  coding opportunities          Chart Reviewed number of suggestions sent to Provider: 1     Patients Insurance     Medicare Insurance: [employer]

## 2022-06-03 ENCOUNTER — APPOINTMENT (OUTPATIENT)
Dept: LAB | Facility: MEDICAL CENTER | Age: 67
End: 2022-06-03
Payer: MEDICARE

## 2022-06-08 ENCOUNTER — TELEPHONE (OUTPATIENT)
Dept: HEMATOLOGY ONCOLOGY | Facility: CLINIC | Age: 67
End: 2022-06-08

## 2022-06-08 DIAGNOSIS — C43.71 MALIGNANT MELANOMA OF RIGHT LOWER EXTREMITY INCLUDING HIP (HCC): Primary | ICD-10-CM

## 2022-06-08 NOTE — TELEPHONE ENCOUNTER
CALL RETURN FORM   Reason for patient call? Patient is requesting a call back regarding his pathology and lab results     Patient's primary oncologist?  [address]    Name of person the patient was calling for?   Erica or Serenity Evangelista    Any additional information to add, if applicable? n/a   Informed patient that the message will be forwarded to the team and someone will get back to them as soon as possible    Did you relay this information to the patient?  yes, patient can be reached back at [phone]

## 2022-06-08 NOTE — TELEPHONE ENCOUNTER
Spoke with patient about his lab results  I informed him that after speaking with Beata Ford about his labs, he should be increasing his prednisone  Patient states that he did increase it to 30mg PO daily  I informed that patient that was the right thing to do  Patient verbalized understanding

## 2022-06-08 NOTE — PROGRESS NOTES
Patient was referred to oncology (Dr Jj Villafuerte) & Dr Zoë Coe (Surg Onc) for further evaluation of diagnosed melanoma

## 2022-06-08 NOTE — TELEPHONE ENCOUNTER
Returned call to patient about lab results and pathology  I informed him that the pathology was done through the derm office and they will have to go over the results for that  As far as the labs, I let him know that I will speak with Arsalan Christie about his results and call him back  Pt verbalized understanding

## 2022-06-09 ENCOUNTER — TELEPHONE (OUTPATIENT)
Dept: DERMATOLOGY | Age: 67
End: 2022-06-09

## 2022-06-09 NOTE — TELEPHONE ENCOUNTER
Pt is calling requesting a return call to discuss his pathology report  Please contact him at earliest convenience  Thank you!

## 2022-06-10 NOTE — TELEPHONE ENCOUNTER
Thanks Pascual Muro! We had a long discussion regarding his biopsy the other day  The voicemail was from a few days ago, per patient  Most likely before I had called  I called again and made sure he was okay

## 2022-06-14 ENCOUNTER — APPOINTMENT (OUTPATIENT)
Dept: LAB | Facility: MEDICAL CENTER | Age: 67
End: 2022-06-14
Payer: MEDICARE

## 2022-06-14 DIAGNOSIS — D59.10 AUTOIMMUNE HEMOLYTIC ANEMIA (HCC): ICD-10-CM

## 2022-06-14 LAB
ERYTHROCYTE [DISTWIDTH] IN BLOOD BY AUTOMATED COUNT: 23 % (ref 11.6–15.1)
HCT VFR BLD AUTO: 26.5 % (ref 36.5–49.3)
HGB BLD-MCNC: 9.4 G/DL (ref 12–17)
MCH RBC QN AUTO: 48.7 PG (ref 26.8–34.3)
MCHC RBC AUTO-ENTMCNC: 35.5 G/DL (ref 31.4–37.4)
MCV RBC AUTO: 137 FL (ref 82–98)
PLATELET # BLD AUTO: 706 THOUSANDS/UL (ref 149–390)
PMV BLD AUTO: 9.5 FL (ref 8.9–12.7)
RBC # BLD AUTO: 1.93 MILLION/UL (ref 3.88–5.62)
WBC # BLD AUTO: 15.12 THOUSAND/UL (ref 4.31–10.16)

## 2022-06-14 PROCEDURE — 85027 COMPLETE CBC AUTOMATED: CPT

## 2022-06-14 PROCEDURE — 36415 COLL VENOUS BLD VENIPUNCTURE: CPT

## 2022-06-16 ENCOUNTER — DOCUMENTATION (OUTPATIENT)
Dept: HEMATOLOGY ONCOLOGY | Facility: CLINIC | Age: 67
End: 2022-06-16

## 2022-06-16 ENCOUNTER — TELEPHONE (OUTPATIENT)
Dept: HEMATOLOGY ONCOLOGY | Facility: CLINIC | Age: 67
End: 2022-06-16

## 2022-06-16 NOTE — TELEPHONE ENCOUNTER
Intake Received, chart reviewed  Pathology completed: Yes, 5/31/2022  All records needed are in patients chart  No further actions needed at this time

## 2022-06-16 NOTE — TELEPHONE ENCOUNTER
Intake received  Pt has active medicare A &B  Pt also has an active supplemental plan G thru [employer]  Pt resp for the part B deduct    06/22/22  Called medicare automatic system & pt has met the $233 part B deduct  The supplemental plan will pay the 20% that medicare doesn't pay   Insurance education outreach not needed at this time

## 2022-06-16 NOTE — TELEPHONE ENCOUNTER
Soft Intake Form   Patient Details   Castillo Brooks     1955     Reason For Appointment   Who is Calling? Patient   If not patient, Name? DID YOU CONFIRM INSURANCE WITH PATIENT? yes   Who is the Referring Doctor? Jasiel Hartman   What is the diagnosis? Malignant melanoma of right lower extremity including hip    Has this diagnosis been confirmed by a biopsy/surgery? If yes, what is the date it was done? Yes  5/31/22   Biopsy done at [place] [address]? If not, where was it done? yes   Was imaging done, and was it done at [facility]? If not, where was it done? Yes, [facility]   Have you been seen by another Oncologist?  If so, who and where (name of facility, city and state) Yes,   Current Dr Tatyana Brantley patient   For 2nd Opinions Only: Are you currently undergoing treatment, or are you scheduled to start treatment? If yes, name of facility, city and state n/a    For "History Of" only: Have you completed treatment? n/a    Have you had Genetic Testing done in the past?  If so, advise to bring test results to their visit no   Record Gathering Information   Did you advise to have records faxed to [phone]? no   Did you advise to have disks sent to the proper address with imaging? ("History of" Patients)  5 years of imaging for breast patients-Mammos, US etc no   Scheduling Information   Did you send new patient paperwork? Email or mail? n/a   What is the best call back number? (If the [facility] is calling, please use their number) [phone]   Miscellaneous Information      Scheduled appointment 6/30/22 9:00 am, Dr Svetlana Camarena , [facility]  Patient  Request Provider with the first available appointment  Vysr or [facility]ero

## 2022-06-23 ENCOUNTER — TELEPHONE (OUTPATIENT)
Dept: HEMATOLOGY ONCOLOGY | Facility: CLINIC | Age: 67
End: 2022-06-23

## 2022-06-23 NOTE — TELEPHONE ENCOUNTER
Patient calling in requesting to speak with Jez Henderson   Contact [facility] via teams and was able to transfer pt to [facility]

## 2022-06-24 ENCOUNTER — TELEPHONE (OUTPATIENT)
Dept: HEMATOLOGY ONCOLOGY | Facility: CLINIC | Age: 67
End: 2022-06-24

## 2022-06-24 NOTE — TELEPHONE ENCOUNTER
CALL TRANSFER   Reason for patient call? Patient is calling in very concerned for the second time, he was told to call if he had blood in his stool  Patient states he has a large amount of blood  Patient's primary physician? Dr Yanet Burch   RN call was transferred to and time it was transferred? Chitra, 10:15am    Informed patient that the message will be forwarded to the team and someone will get back to them as soon as possible    Did you relay this information to the patient?   Yes

## 2022-06-24 NOTE — TELEPHONE ENCOUNTER
Spoke with Sonam  Instructed pt to have someone take him to the ED, stop prodaxa and asa  (Pt describes blood like diarrhea from rectum)  Will let Dr Nick Morton know of this event

## 2022-06-24 NOTE — TELEPHONE ENCOUNTER
CALL TRANSFER   Reason for patient call? patient is calling because he sates he was told to call if he had blood in his stool  Patient states he just went and had a large amount of blood   Patient's primary physician? Dr Carlos Martinez   RN call was transferred to and time it was transferred? Chitra @ 943am   Informed patient that the message will be forwarded to the team and someone will get back to them as soon as possible    Did you relay this information to the patient?  Yes

## 2022-06-24 NOTE — TELEPHONE ENCOUNTER
Received a call from pt stating he had a large amount of blood from his rectum while having a bowel movement  Pt stated it was like "diarrhea"  Advised pt to go to the ED  Instructed him to have someone drive him, and to stop his pradaxa and aspirin until further evaluation  Will notify Dr Carlos Martinez of these events

## 2022-06-27 ENCOUNTER — TELEPHONE (OUTPATIENT)
Dept: HEMATOLOGY ONCOLOGY | Facility: CLINIC | Age: 67
End: 2022-06-27

## 2022-06-27 ENCOUNTER — APPOINTMENT (OUTPATIENT)
Dept: LAB | Facility: MEDICAL CENTER | Age: 67
End: 2022-06-27
Payer: MEDICARE

## 2022-06-27 DIAGNOSIS — E83.111 IRON OVERLOAD DUE TO REPEATED RED BLOOD CELL TRANSFUSIONS: ICD-10-CM

## 2022-06-27 DIAGNOSIS — D59.10 AUTOIMMUNE HEMOLYTIC ANEMIA (HCC): ICD-10-CM

## 2022-06-27 DIAGNOSIS — D59.10 AUTOIMMUNE HEMOLYTIC ANEMIA (HCC): Primary | ICD-10-CM

## 2022-06-27 DIAGNOSIS — D50.0 IRON DEFICIENCY ANEMIA SECONDARY TO BLOOD LOSS (CHRONIC): ICD-10-CM

## 2022-06-27 LAB
ERYTHROCYTE [DISTWIDTH] IN BLOOD BY AUTOMATED COUNT: 22.1 % (ref 11.6–15.1)
HCT VFR BLD AUTO: 25.3 % (ref 36.5–49.3)
HGB BLD-MCNC: 8.7 G/DL (ref 12–17)
MCH RBC QN AUTO: 46.8 PG (ref 26.8–34.3)
MCHC RBC AUTO-ENTMCNC: 34.4 G/DL (ref 31.4–37.4)
MCV RBC AUTO: 136 FL (ref 82–98)
PLATELET # BLD AUTO: 533 THOUSANDS/UL (ref 149–390)
PMV BLD AUTO: 9.6 FL (ref 8.9–12.7)
RBC # BLD AUTO: 1.86 MILLION/UL (ref 3.88–5.62)
WBC # BLD AUTO: 15.07 THOUSAND/UL (ref 4.31–10.16)

## 2022-06-27 PROCEDURE — 36415 COLL VENOUS BLD VENIPUNCTURE: CPT

## 2022-06-27 PROCEDURE — 85027 COMPLETE CBC AUTOMATED: CPT

## 2022-06-27 NOTE — TELEPHONE ENCOUNTER
Spoke with patient about his Hgb and prednisone usage  Patient states he has been taking 40mg of Prednisone daily because of his Hgb levels and how they drop  Patient states that he adjusts his prednisone dosages himself and usually increases it when his Hgb drops  I informed him that Dr Earlene Valenzuela is comfortable with his Hgb between 10 0 and 11 0 so that we can lessen his prednisone intake  I educated the patient of the effects that prednisone can have on the body and why we want him to be taking a lower dose  The patient verbalized understanding of this  I informed the patient to get his labs drawn again next week so we can see his results and go from there  I asked the patient what supplement he had used in the past because there was a point in which he was taking a lower dose of prednisone with the supplement and his Hgb levels were really good  The patient states that he was using medical marijuana and he stopped using that about 6 months ago, but will start using it again to help his Hgb  I told the patient that I will call him next week to go over labs and medication  Patient verbalized understanding

## 2022-06-27 NOTE — TELEPHONE ENCOUNTER
CALL RETURN FORM   Reason for patient call? Patient calling to inform Dr Pratima Lilly he is concern with his current blood test results  Patient's primary oncologist? Dr Pratima Lilly   Name of person the patient was calling for? Stacy   Any additional information to add, if applicable? [phone]   Informed patient that the message will be forwarded to the team and someone will get back to them as soon as possible    Did you relay this information to the patient?  yes

## 2022-07-01 ENCOUNTER — TELEPHONE (OUTPATIENT)
Dept: CARDIAC SURGERY | Facility: CLINIC | Age: 67
End: 2022-07-01

## 2022-07-01 PROBLEM — C43.71: Status: ACTIVE | Noted: 2022-07-01

## 2022-07-01 NOTE — TELEPHONE ENCOUNTER
 Hello, can I please speak to (patient name) this is (enter your name here) calling from [facility]) to remind you of your appointment on (date and time) at (location)  I am calling to review our no-show/cancelation policy and masking policy  Do you have a few minutes? We ask that you come at least 15 minutes early for your appointment to complete all paperwork  However, If you are up to 20 minutes late for your appointment, we may need to reschedule you  Any lateness to an appointment may result in an shorted visit, for our providers to offer you the most out of your consult, please arrive early  We require at least 24-hour notice for cancelations and if you miss your appointment 3 times, we may unfortunately not be able to reschedule any future visits  We ask that you please call our office in the event you are feeling ill as we may need to reschedule your appointment  You are allowed to bring only one visitor with you to your appointment, if your visitor is not feeling well we ask that you don't bring them  Our current masking policy is: if you are vaccinated masking is optional, if you are unvaccinated masking is required  We look forward to seeing you at your upcoming appointment!

## 2022-07-05 ENCOUNTER — APPOINTMENT (OUTPATIENT)
Dept: LAB | Facility: MEDICAL CENTER | Age: 67
End: 2022-07-05
Payer: MEDICARE

## 2022-07-05 DIAGNOSIS — D50.0 IRON DEFICIENCY ANEMIA SECONDARY TO BLOOD LOSS (CHRONIC): ICD-10-CM

## 2022-07-05 DIAGNOSIS — D59.10 AUTOIMMUNE HEMOLYTIC ANEMIA (HCC): ICD-10-CM

## 2022-07-05 LAB
ANISOCYTOSIS BLD QL SMEAR: PRESENT
BASOPHILS # BLD MANUAL: 0 THOUSAND/UL (ref 0–0.1)
BASOPHILS NFR MAR MANUAL: 0 % (ref 0–1)
EOSINOPHIL # BLD MANUAL: 0.13 THOUSAND/UL (ref 0–0.4)
EOSINOPHIL NFR BLD MANUAL: 1 % (ref 0–6)
ERYTHROCYTE [DISTWIDTH] IN BLOOD BY AUTOMATED COUNT: 23.7 % (ref 11.6–15.1)
HCT VFR BLD AUTO: 24.5 % (ref 36.5–49.3)
HGB BLD-MCNC: 8.4 G/DL (ref 12–17)
LYMPHOCYTES # BLD AUTO: 2.95 THOUSAND/UL (ref 0.6–4.47)
LYMPHOCYTES # BLD AUTO: 22 % (ref 14–44)
MACROCYTES BLD QL AUTO: PRESENT
MCH RBC QN AUTO: 50.6 PG (ref 26.8–34.3)
MCHC RBC AUTO-ENTMCNC: 34.3 G/DL (ref 31.4–37.4)
MCV RBC AUTO: 148 FL (ref 82–98)
MICROCYTES BLD QL AUTO: PRESENT
MONOCYTES # BLD AUTO: 1.21 THOUSAND/UL (ref 0–1.22)
MONOCYTES NFR BLD: 9 % (ref 4–12)
NEUTROPHILS # BLD MANUAL: 8.6 THOUSAND/UL (ref 1.85–7.62)
NEUTS SEG NFR BLD AUTO: 64 % (ref 43–75)
NRBC BLD AUTO-RTO: 17 /100 WBC (ref 0–2)
PLATELET # BLD AUTO: 556 THOUSANDS/UL (ref 149–390)
PLATELET BLD QL SMEAR: ABNORMAL
PMV BLD AUTO: 9.8 FL (ref 8.9–12.7)
POLYCHROMASIA BLD QL SMEAR: PRESENT
RBC # BLD AUTO: 1.66 MILLION/UL (ref 3.88–5.62)
RBC MORPH BLD: PRESENT
VARIANT LYMPHS # BLD AUTO: 4 %
WBC # BLD AUTO: 13.43 THOUSAND/UL (ref 4.31–10.16)

## 2022-07-05 PROCEDURE — 82728 ASSAY OF FERRITIN: CPT

## 2022-07-05 PROCEDURE — 85007 BL SMEAR W/DIFF WBC COUNT: CPT

## 2022-07-05 PROCEDURE — 85027 COMPLETE CBC AUTOMATED: CPT

## 2022-07-05 PROCEDURE — 83540 ASSAY OF IRON: CPT

## 2022-07-05 PROCEDURE — 36415 COLL VENOUS BLD VENIPUNCTURE: CPT

## 2022-07-05 PROCEDURE — 83550 IRON BINDING TEST: CPT

## 2022-07-05 PROCEDURE — 80053 COMPREHEN METABOLIC PANEL: CPT

## 2022-07-06 ENCOUNTER — TELEPHONE (OUTPATIENT)
Dept: HEMATOLOGY ONCOLOGY | Facility: CLINIC | Age: 67
End: 2022-07-06

## 2022-07-06 ENCOUNTER — CONSULT (OUTPATIENT)
Dept: SURGICAL ONCOLOGY | Facility: CLINIC | Age: 67
End: 2022-07-06
Payer: MEDICARE

## 2022-07-06 ENCOUNTER — HOSPITAL ENCOUNTER (OUTPATIENT)
Dept: RADIOLOGY | Facility: HOSPITAL | Age: 67
Discharge: HOME | End: 2022-07-06
Attending: STUDENT IN AN ORGANIZED HEALTH CARE EDUCATION/TRAINING PROGRAM
Payer: MEDICARE

## 2022-07-06 VITALS
DIASTOLIC BLOOD PRESSURE: 84 MMHG | BODY MASS INDEX: 30.29 KG/M2 | SYSTOLIC BLOOD PRESSURE: 126 MMHG | OXYGEN SATURATION: 98 % | WEIGHT: 193 LBS | TEMPERATURE: 98.2 F | HEIGHT: 67 IN | HEART RATE: 89 BPM | RESPIRATION RATE: 16 BRPM

## 2022-07-06 DIAGNOSIS — E53.8 B12 DEFICIENCY: ICD-10-CM

## 2022-07-06 DIAGNOSIS — D59.10 AUTOIMMUNE HEMOLYTIC ANEMIA (HCC): Primary | ICD-10-CM

## 2022-07-06 DIAGNOSIS — C43.71 MALIGNANT MELANOMA OF RIGHT LOWER EXTREMITY INCLUDING HIP (HCC): ICD-10-CM

## 2022-07-06 DIAGNOSIS — C43.71 MALIGNANT MELANOMA OF LEG, RIGHT (HCC): ICD-10-CM

## 2022-07-06 DIAGNOSIS — C43.71 MALIGNANT MELANOMA OF LEG, RIGHT (HCC): Primary | ICD-10-CM

## 2022-07-06 LAB
ALBUMIN SERPL BCP-MCNC: 3.8 G/DL (ref 3.5–5)
ALP SERPL-CCNC: 62 U/L (ref 46–116)
ALT SERPL W P-5'-P-CCNC: 31 U/L (ref 12–78)
ANION GAP SERPL CALCULATED.3IONS-SCNC: 13 MMOL/L (ref 4–13)
AST SERPL W P-5'-P-CCNC: 32 U/L (ref 5–45)
BILIRUB SERPL-MCNC: 4.3 MG/DL (ref 0.2–1)
BUN SERPL-MCNC: 16 MG/DL (ref 5–25)
CALCIUM SERPL-MCNC: 9.2 MG/DL (ref 8.3–10.1)
CHLORIDE SERPL-SCNC: 104 MMOL/L (ref 100–108)
CO2 SERPL-SCNC: 22 MMOL/L (ref 21–32)
CREAT SERPL-MCNC: 1.28 MG/DL (ref 0.6–1.3)
FERRITIN SERPL-MCNC: 370 NG/ML (ref 8–388)
GFR SERPL CREATININE-BSD FRML MDRD: 57 ML/MIN/1.73SQ M
GLUCOSE P FAST SERPL-MCNC: 123 MG/DL (ref 65–99)
IRON SATN MFR SERPL: 57 % (ref 20–50)
IRON SERPL-MCNC: 243 UG/DL (ref 65–175)
POTASSIUM SERPL-SCNC: 3.5 MMOL/L (ref 3.5–5.3)
PROT SERPL-MCNC: 6.5 G/DL (ref 6.4–8.2)
SODIUM SERPL-SCNC: 139 MMOL/L (ref 136–145)
TIBC SERPL-MCNC: 423 UG/DL (ref 250–450)

## 2022-07-06 PROCEDURE — 99204 OFFICE O/P NEW MOD 45 MIN: CPT | Performed by: STUDENT IN AN ORGANIZED HEALTH CARE EDUCATION/TRAINING PROGRAM

## 2022-07-06 PROCEDURE — 76705 ECHO EXAM OF ABDOMEN: CPT

## 2022-07-06 RX ORDER — TRAMADOL HYDROCHLORIDE 50 MG/1
50 TABLET ORAL EVERY 6 HOURS PRN
Qty: 10 TABLET | Refills: 0 | Status: SHIPPED | OUTPATIENT
Start: 2022-07-06 | End: 2022-10-24

## 2022-07-06 RX ORDER — PREDNISONE 20 MG/1
60 TABLET ORAL DAILY
Qty: 42 TABLET | Refills: 1 | Status: ON HOLD | OUTPATIENT
Start: 2022-07-06 | End: 2022-08-04 | Stop reason: SDUPTHER

## 2022-07-06 RX ORDER — CEFAZOLIN SODIUM 2 G/50ML
2000 SOLUTION INTRAVENOUS ONCE
Status: CANCELLED | OUTPATIENT
Start: 2022-07-06 | End: 2022-07-06

## 2022-07-06 NOTE — LETTER
July 6, 2022     Jimmy Salazar DO  [address] [place] [address]    Patient: Melina Shi   YOB: 1955   Date of Visit: 7/6/2022       Dear Dr Kd Meza: Thank you for referring Marquise Aguila to me for evaluation  Below are my notes for this consultation  If you have questions, please do not hesitate to call me  I look forward to following your patient along with you  Sincerely,        Dilcia Morris MD        CC: DO Dilcia Harrington MD  7/6/2022  2:46 PM  Sign when Signing Visit  Surgical Oncology Consultation    [address]  [phone]    Patient:  Melina Shi  1955  [id number]    Primary Care provider:  Garima Palencia, [address] [place] [address],[address]  [address] [place] [address] Dr Denson    Referring provider:  Jimmy Salazar DO  [address] [facility],  [address] [place] [address]    Diagnoses and all orders for this visit:    Malignant melanoma of leg, right ([facility] [address] )  -     Case request operating room: EXCISION WIDE LESION RIGHT LOWER EXTREMITY, BIOPSY LYMPH NODE SENTINEL; Standing  -     EKG 12 lead; Future  -     traMADol (Ultram) 50 mg tablet; Take 1 tablet (50 mg total) by mouth every 6 (six) hours as needed for moderate pain  -     NM lymphatic melanoma; Future  -     US inguinal area;  Future  -     Case request operating room: EXCISION WIDE LESION RIGHT LOWER EXTREMITY, BIOPSY LYMPH NODE SENTINEL    Malignant melanoma of right lower extremity including hip ([facility] [address] )  -     Ambulatory Referral to Surgical Oncology  -     Ambulatory Referral to Hematology / Oncology    Other orders  -     Incentive spirometry; Standing  -     Insert and maintain IV line; Standing  -     Void On-Call to O R ; Standing  -     Place sequential compression device; Standing  -     ceFAZolin (ANCEF) IVPB (premix in dextrose) 2,000 mg 50 mL        Chief Complaint   Patient presents with   Preeti Mukherjee Consult No follow-ups on file  Oncology History   Malignant melanoma of leg, right ([facility] [address] )   5/31/2022 Biopsy    Right Leg, Shave Biopsy   Melanoma extending to the deep specimen margin  Thickness: 7 0mm  Ulceration: not seen   Mitoses: 3      7/1/2022 Initial Diagnosis    Malignant melanoma of leg, right (HCC)         History of Present Illness  : This is a 48-year-old gentleman with a new diagnosis of a T4 melanoma of the right medial thigh just superior to the knee  The patient states that about 7 or 8 months ago he has noticed a nodule just deep to the skin  The last several months this grew rapidly and became blood blister appearing  He was concerned about its appearance and sought consultation with Dermatology  A shave biopsy was performed, demonstrating a T4 a melanoma with a positive deep margin  The patient denies any other lumps or bumps of the leg or groin  He denies any weight loss, headache, bone pain  He does have a history of hemolytic anemia, and today was started on a course of 60 of prednisone due to an elevation in his disease state  He is also on Pradaxa for VTE in the setting of his disease  The patient has no personal history of melanoma  No personal history of nonmelanomatous skin cancer  He does have a history of extensive sun exposure and can recall several sunburns  At this time, he uses SPF 8 when he will be in the sun for an extensive period of time like at the pool or golfing, both of which he does regularly  Review of Systems  Complete ROS Surg Onc:   Constitutional: The patient denies new or recent history of general fatigue, no recent weight loss, no change in appetite  Eyes: No complaints of visual problems, no scleral icterus  ENT: No complaints of ear pain, no hoarseness, no difficulty swallowing,  no tinnitus and no new masses in head, oral cavity, or neck  Cardiovascular: No complaints of chest pain, no palpitations, no ankle edema     Respiratory: No complaints of shortness of breath, no cough  Gastrointestinal: No complaints of jaundice, no bloody stools, no pale stools  Genitourinary: No complaints of dysuria, no hematuria, no nocturia, no frequent urination, no urethral discharge  Musculoskeletal: No complaints of weakness, paralysis, joint stiffness or arthralgias  Integumentary: No complaints of rash, no new lesions  Neurological: No complaints of convulsions, no seizures, no dizziness  Hematologic/Lymphatic: No complaints of easy bruising  Endocrine:  No hot or cold intolerance  No polydipsia, polyphagia, or polyuria  Allergy/immunology:  No environmental allergies  No food allergies  Not immunocompromised        Patient Active Problem List   Diagnosis    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    Hyperbilirubinemia    Symptomatic anemia    Hx of pulmonary embolus    Portal vein thrombosis    Elevated blood pressure reading without diagnosis of hypertension    Shortness of breath    Gastroesophageal reflux disease    Autoimmune hemolytic anemia    ARABELLA (acute kidney injury) (HCC)    Splenomegaly    Iron overload due to repeated red blood cell transfusions    Posttraumatic stress disorder    Essential hypertension    Glucose intolerance (impaired glucose tolerance)    Renal insufficiency    Auto immune neutropenia (HCC)    Primary osteoarthritis of left knee    Pain in joint, lower leg    Pain in left knee    COVID-19    Thrombocytosis    Primary localized osteoarthrosis of the knee, right    Hyperglycemia    Chronic bilateral low back pain with bilateral sciatica    Hypercholesterolemia    Malignant melanoma of leg, right ([address] )     Past Medical History:   Diagnosis Date    Anemia 11/2/2016    Anxiety     Arthritis     Autoimmune hemolytic anemia ([address] )     Claustrophobia     COVID 12/2020    DVT (deep venous thrombosis) (HCC)     GERD (gastroesophageal reflux disease)     Hearing loss, right     Hemolytic anemia ([facility] )     History of transfusion     2018 - no adverse reaction    Hypertension     Malignant melanoma of leg, right ([facility] ) 7/1/2022    Palpitation     Portal vein thrombosis     PTSD (post-traumatic stress disorder)     Pulmonary emboli (HCC)     Tobacco abuse      Past Surgical History:   Procedure Laterality Date    CATARACT EXTRACTION Bilateral     CHOLECYSTECTOMY  7/18/2017    COLONOSCOPY      ELBOW ARTHROPLASTY Left     bursectomy    JOINT REPLACEMENT Right 2/2/2021    knee    KNEE SURGERY Right     meniscus tear    [place] LAP,CHOLECYSTECTOMY/GRAPH N/A 12/23/2017    Procedure: CHOLECYSTECTOMY LAPAROSCOPIC with cholangiogram;  Surgeon: Ivett Mathur MD;  [facility];  Service: General    [place] REMOVAL SPLEEN, TOTAL N/A 5/18/2017    Procedure: LAPAROSCOPIC HAND ASSIST SPLENECTOMY;  Surgeon: Georgia Retana MD;  [facility];  Service: Surgical Oncology    [place] TOTAL KNEE ARTHROPLASTY Right 2/2/2021    Procedure: ARTHROPLASTY KNEE TOTAL;  Surgeon: Corine Sandifer, MD;  [facility];  Service: Orthopedics    [place] TOTAL KNEE ARTHROPLASTY Left 11/17/2021    Procedure: TOTAL KNEE REPLACEMENT;  Surgeon: Corine Sandifer, MD;  [facility];  Service: Orthopedics    SHOULDER SURGERY Left     rotator cuff x4, reconstruction     Family History   Problem Relation Age of Onset   Aaron Abt Cancer Mother     Breast cancer Mother     Other Father         CABG    Heart attack Paternal Grandfather         acute MI     Social History     Socioeconomic History    Marital status: /Civil Union     Spouse name: Not on file    Number of children: Not on file    Years of education: Not on file    Highest education level: Not on file   Occupational History    Not on file   Tobacco Use    Smoking status: Current Some Day Smoker     Packs/day: 0 00     Years: 5 00     Pack years: 0 00     Types: Cigars    Smokeless tobacco: Current User     Types: Snuff    Tobacco comment: 2 cigars per day   Vaping Use    Vaping Use: Never used   Substance and Sexual Activity    Alcohol use:  Yes     Alcohol/week: 6 0 standard drinks     Types: 6 Cans of beer per week     Comment: socially    Drug use: Yes     Types: Marijuana     Comment: medical marijuana    Sexual activity: Yes     Partners: Female     Birth control/protection: None   Other Topics Concern    Not on file   Social History Narrative    Daily coffee consumption (2 cups/day)    reitred     Social Determinants of Health     Financial Resource Strain: Not on file   Food Insecurity: Not on file   Transportation Needs: Not on file   Physical Activity: Not on file   Stress: Not on file   Social Connections: Not on file   Intimate Partner Violence: Not on file   Housing Stability: Not on file       Current Outpatient Medications:     acetaminophen (TYLENOL) 325 mg tablet, Take 2 tablets (650 mg total) by mouth every 6 (six) hours as needed for mild pain, Disp:  , Rfl: 0    ascorbic acid (VITAMIN C) 1000 MG tablet, Take 1 tablet (1,000 mg total) by mouth every 12 (twelve) hours for 6 doses (Patient taking differently: Take 1,000 mg by mouth daily Every other day), Disp: 6 tablet, Rfl: 0    hydrochlorothiazide (HYDRODIURIL) 25 mg tablet, Take 1 tablet (25 mg total) by mouth daily, Disp: 30 tablet, Rfl: 5    metoprolol succinate (TOPROL-XL) 25 mg 24 hr tablet, Take 0 5 tablets (12 5 mg total) by mouth daily, Disp: 30 tablet, Rfl: 5    pantoprazole (PROTONIX) 40 mg tablet, Take 1 tablet (40 mg total) by mouth daily, Disp: 90 tablet, Rfl: 3    potassium chloride (K-DUR,KLOR-CON) 20 mEq tablet, Take 1 tablet (20 mEq total) by mouth daily, Disp: 30 tablet, Rfl: 3    prazosin (MINIPRESS) 1 mg capsule, Take 1 mg by mouth daily at bedtime , Disp: , Rfl:     predniSONE 10 MG (21) TBPK, Take by mouth 60 mg per day, Disp: , Rfl:     predniSONE 2 5 mg tablet, Take 2 tablets (5 mg total) by mouth daily, Disp: 60 tablet, Rfl: 2    traMADol (Ultram) 50 mg tablet, Take 1 tablet (50 mg total) by mouth every 6 (six) hours as needed for moderate pain, Disp: 10 tablet, Rfl: 0    VITAMIN D PO, Take 1,000 Units by mouth daily , Disp: , Rfl:     cyclobenzaprine (FEXMID) 7 5 MG tablet, Take 1 tablet (7 5 mg total) by mouth daily at bedtime (Patient not taking: Reported on 7/6/2022), Disp: 30 tablet, Rfl: 0    dabigatran etexilate (PRADAXA) 75 mg capsule, Take 1 capsule (75 mg total) by mouth every 12 (twelve) hours (Patient not taking: No sig reported), Disp: , Rfl: 0  Allergies   Allergen Reactions    Iodinated Diagnostic Agents Hives     Unsure if this is still an allergy       Vitals:    07/06/22 1357   BP: 126/84   Pulse: 89   Resp: 16   Temp: 98 2 °F (36 8 °C)   SpO2: 98%       Physical Exam   General: Appears well, appears stated age  Skin: Warm, anicteric  HEENT: Normocephalic, atraumatic; sclera aniceteric, mucous membranes moist; cervical nodes without adenopathy  Cardiopulmonary: RRR, Easy WOB, no BLE edema  Abd: Flat and soft, nontender, no masses appreciated, no hepatosplenomegaly  MSK: Symmetric, no cyanosis, no overt weakness  Lymphatic: No cervical, axillary lymphadenopathy; nodular contour of right inguinal nodes  Neuro: Affect appropriate, no gross motor abnormalities                Pathology:  Final Diagnosis   A  Skin, right leg, shave biopsy:     MELANOMA (thickness: at least 7 0 mm); extending to the deep specimen margin (see note)      Note: SOX10 and MART-1/CD31 immunostains were performed and reviewed  Please see synoptic report for additional details   The results were emailed to Dr Rachelle Carmichael on 6/7/2022      Synoptic report for melanoma of the skin  Thickness: at least 7 0 mm (invasive melanoma extends to deep specimen margin)  Ulceration: not seen  Anatomic [place]) level: at least IV (invasive melanoma extends to deep specimen margin)  Type: nodular melanoma  Mitoses: 3/mm2  Microsatellites: not seen  Lymphovascular invasion: present (confirmed on CD31/MART-1 double immunostain)  Neurotropism: not seen  Tumor regression: not seen  Tumor-infiltrating lymphocytes (TIL): present, non-brisk  Margin assessment: invasive melanoma extends to deep specimen margin  Pathologic stage: pT4a  Associated nevus: not seen            Labs: Reviewed in EPIC      I independently reviewed and interpreted the above laboratory and imaging data, incl derm ntoes, path, med onc notes  I have discussed this pt with Dr Vilma Jordan      Discussion/Summary:   76 yo male with new diagnosis of T4a melanoma of right knee  Discussed diagnosis of melanoma and explained that the patient has a thick melanoma for which a wide local excision and sentinel lymph node biopsy is recommended  I also discussed that because the patient has nodularity of the right groin, I would 1st like to obtain a stat inguinal ultrasound  If there is evidence of abnormal lymph nodes, then these will require percutaneous biopsy  If these nodes are positive, then I would likely recommend that he proceed with systemic therapy before surgical therapy  If, however, this ultrasound is negative, we will move forward with wide local excision and sentinel lymph node biopsy  Discussed that any additional therapy will be guided by the final pathology and results of the LN bx  Risks, benefits, alternatives and prognosis discussed in full to include bleeding, infection, wound healing complications, need for re-excision, seroma, and pt expresses understanding and endorsement  The patient is at above average risk for these complications due to his high-dose steroid usage at this time  Likewise, he will need to hold his Pradaxa for 2-3 days before surgery  Discussed that sun protection is best prevention for melanoma and discussed ongoing sun protection  Will proceed with WLE + SLN bx following inguinal US

## 2022-07-06 NOTE — PROGRESS NOTES
Surgical Oncology Consultation    [address] [facility] CANCER CARE SURGICAL ONCOLOGY [address] [facility]  [phone]    Patient:  Franny Murillo  1955  [id number]    Primary Care provider:  Keyla Garcia, [address] [facility]  [address] [facility] [address]    Referring provider:  DO SKY Tom [address],  [address] [facility]    Diagnoses and all orders for this visit:    Malignant melanoma of leg, right ([facility] [address] )  -     Case request operating room: EXCISION WIDE LESION RIGHT LOWER EXTREMITY, BIOPSY LYMPH NODE SENTINEL; Standing  -     EKG 12 lead; Future  -     traMADol (Ultram) 50 mg tablet; Take 1 tablet (50 mg total) by mouth every 6 (six) hours as needed for moderate pain  -     NM lymphatic melanoma; Future  -     US inguinal area; Future  -     Case request operating room: EXCISION WIDE LESION RIGHT LOWER EXTREMITY, BIOPSY LYMPH NODE SENTINEL    Malignant melanoma of right lower extremity including hip ([facility] [address] )  -     Ambulatory Referral to Surgical Oncology  -     Ambulatory Referral to Hematology / Oncology    Other orders  -     Incentive spirometry; Standing  -     Insert and maintain IV line; Standing  -     Void On-Call to O R ; Standing  -     Place sequential compression device; Standing  -     ceFAZolin (ANCEF) IVPB (premix in dextrose) 2,000 mg 50 mL        Chief Complaint   Patient presents with    Consult       No follow-ups on file  Oncology History   Malignant melanoma of leg, right ([facility] [address] )   5/31/2022 Biopsy    Right Leg, Shave Biopsy   Melanoma extending to the deep specimen margin  Thickness: 7 0mm  Ulceration: not seen   Mitoses: 3      7/1/2022 Initial Diagnosis    Malignant melanoma of leg, right (HCC)         History of Present Illness  : This is a 79-year-old gentleman with a new diagnosis of a T4 melanoma of the right medial thigh just superior to the knee    The patient states that about 7 or 8 months ago he has noticed a nodule just deep to the skin  The last several months this grew rapidly and became blood blister appearing  He was concerned about its appearance and sought consultation with Dermatology  A shave biopsy was performed, demonstrating a T4 a melanoma with a positive deep margin  The patient denies any other lumps or bumps of the leg or groin  He denies any weight loss, headache, bone pain  He does have a history of hemolytic anemia, and today was started on a course of 60 of prednisone due to an elevation in his disease state  He is also on Pradaxa for VTE in the setting of his disease  The patient has no personal history of melanoma  No personal history of nonmelanomatous skin cancer  He does have a history of extensive sun exposure and can recall several sunburns  At this time, he uses SPF 8 when he will be in the sun for an extensive period of time like at the pool or golfing, both of which he does regularly  Review of Systems  Complete ROS Surg Onc:   Constitutional: The patient denies new or recent history of general fatigue, no recent weight loss, no change in appetite  Eyes: No complaints of visual problems, no scleral icterus  ENT: No complaints of ear pain, no hoarseness, no difficulty swallowing,  no tinnitus and no new masses in head, oral cavity, or neck  Cardiovascular: No complaints of chest pain, no palpitations, no ankle edema  Respiratory: No complaints of shortness of breath, no cough  Gastrointestinal: No complaints of jaundice, no bloody stools, no pale stools  Genitourinary: No complaints of dysuria, no hematuria, no nocturia, no frequent urination, no urethral discharge  Musculoskeletal: No complaints of weakness, paralysis, joint stiffness or arthralgias  Integumentary: No complaints of rash, no new lesions  Neurological: No complaints of convulsions, no seizures, no dizziness  Hematologic/Lymphatic: No complaints of easy bruising     Endocrine:  No hot or cold intolerance  No polydipsia, polyphagia, or polyuria  Allergy/immunology:  No environmental allergies  No food allergies  Not immunocompromised        Patient Active Problem List   Diagnosis    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    Hyperbilirubinemia    Symptomatic anemia    Hx of pulmonary embolus    Portal vein thrombosis    Elevated blood pressure reading without diagnosis of hypertension    Shortness of breath    Gastroesophageal reflux disease    Autoimmune hemolytic anemia    ARABELLA (acute kidney injury) (HCC)    Splenomegaly    Iron overload due to repeated red blood cell transfusions    Posttraumatic stress disorder    Essential hypertension    Glucose intolerance (impaired glucose tolerance)    Renal insufficiency    Auto immune neutropenia (HCC)    Primary osteoarthritis of left knee    Pain in joint, lower leg    Pain in left knee    COVID-19    Thrombocytosis    Primary localized osteoarthrosis of the knee, right    Hyperglycemia    Chronic bilateral low back pain with bilateral sciatica    Hypercholesterolemia    Malignant melanoma of leg, right ([address] )     Past Medical History:   Diagnosis Date    Anemia 11/2/2016    Anxiety     Arthritis     Autoimmune hemolytic anemia ([address] )     Claustrophobia     COVID 12/2020    DVT (deep venous thrombosis) (HCC)     GERD (gastroesophageal reflux disease)     Hearing loss, right     Hemolytic anemia ([address] )     History of transfusion     2018 - no adverse reaction    Hypertension     Malignant melanoma of leg, right ([address] ) 7/1/2022    Palpitation     Portal vein thrombosis     PTSD (post-traumatic stress disorder)     Pulmonary emboli (HCC)     Tobacco abuse      Past Surgical History:   Procedure Laterality Date    CATARACT EXTRACTION Bilateral     CHOLECYSTECTOMY  7/18/2017    COLONOSCOPY      ELBOW ARTHROPLASTY Left     bursectomy    JOINT REPLACEMENT Right 2/2/2021    knee    KNEE SURGERY Right     meniscus tear  IN LAP,CHOLECYSTECTOMY/GRAPH N/A 12/23/2017    Procedure: CHOLECYSTECTOMY LAPAROSCOPIC with cholangiogram;  Surgeon: Denisha Baker MD;  Location: [facility];  Service: General    IN REMOVAL SPLEEN, TOTAL N/A 5/18/2017    Procedure: LAPAROSCOPIC HAND ASSIST SPLENECTOMY;  Surgeon: Sergio Mason MD;  [facility];  Service: Surgical Oncology    IN TOTAL KNEE ARTHROPLASTY Right 2/2/2021    Procedure: ARTHROPLASTY KNEE TOTAL;  Surgeon: Haily Mann MD;  Location: [facility];  Service: Orthopedics    IN TOTAL KNEE ARTHROPLASTY Left 11/17/2021    Procedure: TOTAL KNEE REPLACEMENT;  Surgeon: Haily Mann MD;  Location: [facility];  Service: Orthopedics    SHOULDER SURGERY Left     rotator cuff x4, reconstruction     Family History   Problem Relation Age of Onset   Kay Liming Cancer Mother     Breast cancer Mother     Other Father         CABG    Heart attack Paternal Grandfather         acute MI     Social History     Socioeconomic History    Marital status: /Civil Union     Spouse name: Not on file    Number of children: Not on file    Years of education: Not on file    Highest education level: Not on file   Occupational History    Not on file   Tobacco Use    Smoking status: Current Some Day Smoker     Packs/day: 0 00     Years: 5 00     Pack years: 0 00     Types: Cigars    Smokeless tobacco: Current User     Types: Snuff    Tobacco comment: 2 cigars per day   Vaping Use    Vaping Use: Never used   Substance and Sexual Activity    Alcohol use:  Yes     Alcohol/week: 6 0 standard drinks     Types: 6 Cans of beer per week     Comment: socially    Drug use: Yes     Types: Marijuana     Comment: medical marijuana    Sexual activity: Yes     Partners: Female     Birth control/protection: None   Other Topics Concern    Not on file   Social History Narrative    Daily coffee consumption (2 cups/day)    reitred     Social Determinants of Health     Financial Resource Strain: Not on [employer] Insecurity: Not on file   Transportation Needs: Not on file   Physical Activity: Not on file   Stress: Not on file   Social Connections: Not on file   Intimate Partner Violence: Not on file   Housing Stability: Not on file       Current Outpatient Medications:     acetaminophen (TYLENOL) 325 mg tablet, Take 2 tablets (650 mg total) by mouth every 6 (six) hours as needed for mild pain, Disp:  , Rfl: 0    ascorbic acid (VITAMIN C) 1000 MG tablet, Take 1 tablet (1,000 mg total) by mouth every 12 (twelve) hours for 6 doses (Patient taking differently: Take 1,000 mg by mouth daily Every other day), Disp: 6 tablet, Rfl: 0    hydrochlorothiazide (HYDRODIURIL) 25 mg tablet, Take 1 tablet (25 mg total) by mouth daily, Disp: 30 tablet, Rfl: 5    metoprolol succinate (TOPROL-XL) 25 mg 24 hr tablet, Take 0 5 tablets (12 5 mg total) by mouth daily, Disp: 30 tablet, Rfl: 5    pantoprazole (PROTONIX) 40 mg tablet, Take 1 tablet (40 mg total) by mouth daily, Disp: 90 tablet, Rfl: 3    potassium chloride (K-DUR,KLOR-CON) 20 mEq tablet, Take 1 tablet (20 mEq total) by mouth daily, Disp: 30 tablet, Rfl: 3    prazosin (MINIPRESS) 1 mg capsule, Take 1 mg by mouth daily at bedtime , Disp: , Rfl:     predniSONE 10 MG (21) TBPK, Take by mouth 60 mg per day, Disp: , Rfl:     predniSONE 2 5 mg tablet, Take 2 tablets (5 mg total) by mouth daily, Disp: 60 tablet, Rfl: 2    traMADol (Ultram) 50 mg tablet, Take 1 tablet (50 mg total) by mouth every 6 (six) hours as needed for moderate pain, Disp: 10 tablet, Rfl: 0    VITAMIN D PO, Take 1,000 Units by mouth daily , Disp: , Rfl:     cyclobenzaprine (FEXMID) 7 5 MG tablet, Take 1 tablet (7 5 mg total) by mouth daily at bedtime (Patient not taking: Reported on 7/6/2022), Disp: 30 tablet, Rfl: 0    dabigatran etexilate (PRADAXA) 75 mg capsule, Take 1 capsule (75 mg total) by mouth every 12 (twelve) hours (Patient not taking: No sig reported), Disp: , Rfl: 0  Allergies   Allergen Reactions    Iodinated Diagnostic Agents Hives     Unsure if this is still an allergy       Vitals:    07/06/22 1357   BP: 126/84   Pulse: 89   Resp: 16   Temp: 98 2 °F (36 8 °C)   SpO2: 98%       Physical Exam   General: Appears well, appears stated age  Skin: Warm, anicteric  HEENT: Normocephalic, atraumatic; sclera aniceteric, mucous membranes moist; cervical nodes without adenopathy  Cardiopulmonary: RRR, Easy WOB, no BLE edema  Abd: Flat and soft, nontender, no masses appreciated, no hepatosplenomegaly  MSK: Symmetric, no cyanosis, no overt weakness  Lymphatic: No cervical, axillary lymphadenopathy; nodular contour of right inguinal nodes  Neuro: Affect appropriate, no gross motor abnormalities                Pathology:  Final Diagnosis   A  Skin, right leg, shave biopsy:     MELANOMA (thickness: at least 7 0 mm); extending to the deep specimen margin (see note)      Note: SOX10 and MART-1/CD31 immunostains were performed and reviewed  Please see synoptic report for additional details  The results were emailed to Dr Nikita Ybarra on 6/7/2022      Synoptic report for melanoma of the skin  Thickness: at least 7 0 mm (invasive melanoma extends to deep specimen margin)  Ulceration: not seen  Anatomic Gearlean Aus) level: at least IV (invasive melanoma extends to deep specimen margin)  Type: nodular melanoma  Mitoses: 3/mm2  Microsatellites: not seen  Lymphovascular invasion: present (confirmed on CD31/MART-1 double immunostain)  Neurotropism: not seen  Tumor regression: not seen  Tumor-infiltrating lymphocytes (TIL): present, non-brisk  Margin assessment: invasive melanoma extends to deep specimen margin  Pathologic stage: pT4a  Associated nevus: not seen            Labs: Reviewed in EPIC      I independently reviewed and interpreted the above laboratory and imaging data, incl derm ntoes, path, med onc notes   I have discussed this pt with Dr Lizeth Dos Santos      Discussion/Summary:   74 yo male with new diagnosis of T4a melanoma of right knee  Discussed diagnosis of melanoma and explained that the patient has a thick melanoma for which a wide local excision and sentinel lymph node biopsy is recommended  I also discussed that because the patient has nodularity of the right groin, I would 1st like to obtain a stat inguinal ultrasound  If there is evidence of abnormal lymph nodes, then these will require percutaneous biopsy  If these nodes are positive, then I would likely recommend that he proceed with systemic therapy before surgical therapy  If, however, this ultrasound is negative, we will move forward with wide local excision and sentinel lymph node biopsy  Discussed that any additional therapy will be guided by the final pathology and results of the LN bx  Risks, benefits, alternatives and prognosis discussed in full to include bleeding, infection, wound healing complications, need for re-excision, seroma, and pt expresses understanding and endorsement  The patient is at above average risk for these complications due to his high-dose steroid usage at this time  Likewise, he will need to hold his Pradaxa for 2-3 days before surgery  Discussed that sun protection is best prevention for melanoma and discussed ongoing sun protection  Will proceed with WLE + SLN bx following inguinal US

## 2022-07-06 NOTE — TELEPHONE ENCOUNTER
Rec'd return call from patient  Advised pt Dr Camden Graham reviewed his labs and would like him to increase his steriod to 60mg for now and start back on his MMJ  Pt states he started MMJ last week and will increase his steroids  Refill to be sent to his pharmacy and pt to repeat labs next week  He verbalized understanding

## 2022-07-06 NOTE — H&P (VIEW-ONLY)
Surgical Oncology Consultation    [address] [facility] SURGICAL ONCOLOGY [address] [facility]  [phone]    Patient:  Sergei Rubio  1955  [id number]    Primary Care provider:  Martha Max, [address] [facility],[address]  [facility] [place] [address]    Referring provider:  James Avilez DO  [address],  [address] [facility]    Diagnoses and all orders for this visit:    Malignant melanoma of leg, right ([facility] [address] )  -     Case request operating room: EXCISION WIDE LESION RIGHT LOWER EXTREMITY, BIOPSY LYMPH NODE SENTINEL; Standing  -     EKG 12 lead; Future  -     traMADol (Ultram) 50 mg tablet; Take 1 tablet (50 mg total) by mouth every 6 (six) hours as needed for moderate pain  -     NM lymphatic melanoma; Future  -     US inguinal area; Future  -     Case request operating room: EXCISION WIDE LESION RIGHT LOWER EXTREMITY, BIOPSY LYMPH NODE SENTINEL    Malignant melanoma of right lower extremity including hip ([facility] )  -     Ambulatory Referral to Surgical Oncology  -     Ambulatory Referral to Hematology / Oncology    Other orders  -     Incentive spirometry; Standing  -     Insert and maintain IV line; Standing  -     Void On-Call to O R ; Standing  -     Place sequential compression device; Standing  -     ceFAZolin (ANCEF) IVPB (premix in dextrose) 2,000 mg 50 mL        Chief Complaint   Patient presents with    Consult       No follow-ups on file  Oncology History   Malignant melanoma of leg, right ([facility] [address] )   5/31/2022 Biopsy    Right Leg, Shave Biopsy   Melanoma extending to the deep specimen margin  Thickness: 7 0mm  Ulceration: not seen   Mitoses: 3      7/1/2022 Initial Diagnosis    Malignant melanoma of leg, right (HCC)         History of Present Illness  : This is a 68-year-old gentleman with a new diagnosis of a T4 melanoma of the right medial thigh just superior to the knee    The patient states that about 7 or 8 months ago he has noticed a nodule just deep to the skin  The last several months this grew rapidly and became blood blister appearing  He was concerned about its appearance and sought consultation with Dermatology  A shave biopsy was performed, demonstrating a T4 a melanoma with a positive deep margin  The patient denies any other lumps or bumps of the leg or groin  He denies any weight loss, headache, bone pain  He does have a history of hemolytic anemia, and today was started on a course of 60 of prednisone due to an elevation in his disease state  He is also on Pradaxa for VTE in the setting of his disease  The patient has no personal history of melanoma  No personal history of nonmelanomatous skin cancer  He does have a history of extensive sun exposure and can recall several sunburns  At this time, he uses SPF 8 when he will be in the sun for an extensive period of time like at the pool or golfing, both of which he does regularly  Review of Systems  Complete ROS Surg Onc:   Constitutional: The patient denies new or recent history of general fatigue, no recent weight loss, no change in appetite  Eyes: No complaints of visual problems, no scleral icterus  ENT: No complaints of ear pain, no hoarseness, no difficulty swallowing,  no tinnitus and no new masses in head, oral cavity, or neck  Cardiovascular: No complaints of chest pain, no palpitations, no ankle edema  Respiratory: No complaints of shortness of breath, no cough  Gastrointestinal: No complaints of jaundice, no bloody stools, no pale stools  Genitourinary: No complaints of dysuria, no hematuria, no nocturia, no frequent urination, no urethral discharge  Musculoskeletal: No complaints of weakness, paralysis, joint stiffness or arthralgias  Integumentary: No complaints of rash, no new lesions  Neurological: No complaints of convulsions, no seizures, no dizziness  Hematologic/Lymphatic: No complaints of easy bruising     Endocrine:  No hot or cold intolerance  No polydipsia, polyphagia, or polyuria  Allergy/immunology:  No environmental allergies  No food allergies  Not immunocompromised        Patient Active Problem List   Diagnosis    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    Hyperbilirubinemia    Symptomatic anemia    Hx of pulmonary embolus    Portal vein thrombosis    Elevated blood pressure reading without diagnosis of hypertension    Shortness of breath    Gastroesophageal reflux disease    Autoimmune hemolytic anemia    ARABELLA (acute kidney injury) (HCC)    Splenomegaly    Iron overload due to repeated red blood cell transfusions    Posttraumatic stress disorder    Essential hypertension    Glucose intolerance (impaired glucose tolerance)    Renal insufficiency    Auto immune neutropenia (HCC)    Primary osteoarthritis of left knee    Pain in joint, lower leg    Pain in left knee    COVID-19    Thrombocytosis    Primary localized osteoarthrosis of the knee, right    Hyperglycemia    Chronic bilateral low back pain with bilateral sciatica    Hypercholesterolemia    Malignant melanoma of leg, right ([address] )     Past Medical History:   Diagnosis Date    Anemia 11/2/2016    Anxiety     Arthritis     Autoimmune hemolytic anemia ([address] )     Claustrophobia     COVID 12/2020    DVT (deep venous thrombosis) (HCC)     GERD (gastroesophageal reflux disease)     Hearing loss, right     Hemolytic anemia ([address] )     History of transfusion     2018 - no adverse reaction    Hypertension     Malignant melanoma of leg, right ([address] ) 7/1/2022    Palpitation     Portal vein thrombosis     PTSD (post-traumatic stress disorder)     Pulmonary emboli (HCC)     Tobacco abuse      Past Surgical History:   Procedure Laterality Date    CATARACT EXTRACTION Bilateral     CHOLECYSTECTOMY  7/18/2017    COLONOSCOPY      ELBOW ARTHROPLASTY Left     bursectomy    JOINT REPLACEMENT Right 2/2/2021    knee    KNEE SURGERY Right     meniscus tear  SD LAP,CHOLECYSTECTOMY/GRAPH N/A 12/23/2017    Procedure: CHOLECYSTECTOMY LAPAROSCOPIC with cholangiogram;  Surgeon: Anton Gallego MD;  [facility];  Service: General    SD REMOVAL SPLEEN, TOTAL N/A 5/18/2017    Procedure: LAPAROSCOPIC HAND ASSIST SPLENECTOMY;  Surgeon: Mayito Mccrary MD;  [facility];  Service: Surgical Oncology    SD TOTAL KNEE ARTHROPLASTY Right 2/2/2021    Procedure: ARTHROPLASTY KNEE TOTAL;  Surgeon: Rayne High MD;  [facility];  Service: Orthopedics    SD TOTAL KNEE ARTHROPLASTY Left 11/17/2021    Procedure: TOTAL KNEE REPLACEMENT;  Surgeon: Rayne High MD;  [facility];  Service: Orthopedics    SHOULDER SURGERY Left     rotator cuff x4, reconstruction     Family History   Problem Relation Age of Onset   Anant Campbell Cancer Mother     Breast cancer Mother     Other Father         CABG    Heart attack Paternal Grandfather         acute MI     Social History     Socioeconomic History    Marital status: /Civil Union     Spouse name: Not on file    Number of children: Not on file    Years of education: Not on file    Highest education level: Not on file   Occupational History    Not on file   Tobacco Use    Smoking status: Current Some Day Smoker     Packs/day: 0 00     Years: 5 00     Pack years: 0 00     Types: Cigars    Smokeless tobacco: Current User     Types: Snuff    Tobacco comment: 2 cigars per day   Vaping Use    Vaping Use: Never used   Substance and Sexual Activity    Alcohol use:  Yes     Alcohol/week: 6 0 standard drinks     Types: 6 Cans of beer per week     Comment: socially    Drug use: Yes     Types: Marijuana     Comment: medical marijuana    Sexual activity: Yes     Partners: Female     Birth control/protection: None   Other Topics Concern    Not on file   Social History Narrative    Daily coffee consumption (2 cups/day)    reitred     Social Determinants of Health     Financial Resource Strain: Not on [employer] Insecurity: Not on file   Transportation Needs: Not on file   Physical Activity: Not on file   Stress: Not on file   Social Connections: Not on file   Intimate Partner Violence: Not on file   Housing Stability: Not on file       Current Outpatient Medications:     acetaminophen (TYLENOL) 325 mg tablet, Take 2 tablets (650 mg total) by mouth every 6 (six) hours as needed for mild pain, Disp:  , Rfl: 0    ascorbic acid (VITAMIN C) 1000 MG tablet, Take 1 tablet (1,000 mg total) by mouth every 12 (twelve) hours for 6 doses (Patient taking differently: Take 1,000 mg by mouth daily Every other day), Disp: 6 tablet, Rfl: 0    hydrochlorothiazide (HYDRODIURIL) 25 mg tablet, Take 1 tablet (25 mg total) by mouth daily, Disp: 30 tablet, Rfl: 5    metoprolol succinate (TOPROL-XL) 25 mg 24 hr tablet, Take 0 5 tablets (12 5 mg total) by mouth daily, Disp: 30 tablet, Rfl: 5    pantoprazole (PROTONIX) 40 mg tablet, Take 1 tablet (40 mg total) by mouth daily, Disp: 90 tablet, Rfl: 3    potassium chloride (K-DUR,KLOR-CON) 20 mEq tablet, Take 1 tablet (20 mEq total) by mouth daily, Disp: 30 tablet, Rfl: 3    prazosin (MINIPRESS) 1 mg capsule, Take 1 mg by mouth daily at bedtime , Disp: , Rfl:     predniSONE 10 MG (21) TBPK, Take by mouth 60 mg per day, Disp: , Rfl:     predniSONE 2 5 mg tablet, Take 2 tablets (5 mg total) by mouth daily, Disp: 60 tablet, Rfl: 2    traMADol (Ultram) 50 mg tablet, Take 1 tablet (50 mg total) by mouth every 6 (six) hours as needed for moderate pain, Disp: 10 tablet, Rfl: 0    VITAMIN D PO, Take 1,000 Units by mouth daily , Disp: , Rfl:     cyclobenzaprine (FEXMID) 7 5 MG tablet, Take 1 tablet (7 5 mg total) by mouth daily at bedtime (Patient not taking: Reported on 7/6/2022), Disp: 30 tablet, Rfl: 0    dabigatran etexilate (PRADAXA) 75 mg capsule, Take 1 capsule (75 mg total) by mouth every 12 (twelve) hours (Patient not taking: No sig reported), Disp: , Rfl: 0  Allergies   Allergen Reactions    Iodinated Diagnostic Agents Hives     Unsure if this is still an allergy       Vitals:    07/06/22 1357   BP: 126/84   Pulse: 89   Resp: 16   Temp: 98 2 °F (36 8 °C)   SpO2: 98%       Physical Exam   General: Appears well, appears stated age  Skin: Warm, anicteric  HEENT: Normocephalic, atraumatic; sclera aniceteric, mucous membranes moist; cervical nodes without adenopathy  Cardiopulmonary: RRR, Easy WOB, no BLE edema  Abd: Flat and soft, nontender, no masses appreciated, no hepatosplenomegaly  MSK: Symmetric, no cyanosis, no overt weakness  Lymphatic: No cervical, axillary lymphadenopathy; nodular contour of right inguinal nodes  Neuro: Affect appropriate, no gross motor abnormalities                Pathology:  Final Diagnosis   A  Skin, right leg, shave biopsy:     MELANOMA (thickness: at least 7 0 mm); extending to the deep specimen margin (see note)      Note: SOX10 and MART-1/CD31 immunostains were performed and reviewed  Please see synoptic report for additional details  The results were emailed to Dr Britt Cordova on 6/7/2022      Synoptic report for melanoma of the skin  Thickness: at least 7 0 mm (invasive melanoma extends to deep specimen margin)  Ulceration: not seen  Anatomic Ian Lester) level: at least IV (invasive melanoma extends to deep specimen margin)  Type: nodular melanoma  Mitoses: 3/mm2  Microsatellites: not seen  Lymphovascular invasion: present (confirmed on CD31/MART-1 double immunostain)  Neurotropism: not seen  Tumor regression: not seen  Tumor-infiltrating lymphocytes (TIL): present, non-brisk  Margin assessment: invasive melanoma extends to deep specimen margin  Pathologic stage: pT4a  Associated nevus: not seen            Labs: Reviewed in EPIC      I independently reviewed and interpreted the above laboratory and imaging data, incl derm ntoes, path, med onc notes   I have discussed this pt with Dr Ran Mosqueda      Discussion/Summary:   74 yo male with new diagnosis of T4a melanoma of right knee  Discussed diagnosis of melanoma and explained that the patient has a thick melanoma for which a wide local excision and sentinel lymph node biopsy is recommended  I also discussed that because the patient has nodularity of the right groin, I would 1st like to obtain a stat inguinal ultrasound  If there is evidence of abnormal lymph nodes, then these will require percutaneous biopsy  If these nodes are positive, then I would likely recommend that he proceed with systemic therapy before surgical therapy  If, however, this ultrasound is negative, we will move forward with wide local excision and sentinel lymph node biopsy  Discussed that any additional therapy will be guided by the final pathology and results of the LN bx  Risks, benefits, alternatives and prognosis discussed in full to include bleeding, infection, wound healing complications, need for re-excision, seroma, and pt expresses understanding and endorsement  The patient is at above average risk for these complications due to his high-dose steroid usage at this time  Likewise, he will need to hold his Pradaxa for 2-3 days before surgery  Discussed that sun protection is best prevention for melanoma and discussed ongoing sun protection  Will proceed with WLE + SLN bx following inguinal US

## 2022-07-06 NOTE — TELEPHONE ENCOUNTER
CALL RETURN FORM   Reason for patient call? Patient would like to discuss blood test results    Patient's primary oncologist?  [facility]   Name of person the patient was calling for? Chioma   Any additional information to add, if applicable? Please call [phone]   Informed patient that the message will be forwarded to the team and someone will get back to them as soon as possible    Did you relay this information to the patient?  yes

## 2022-07-08 ENCOUNTER — TELEPHONE (OUTPATIENT)
Dept: SURGICAL ONCOLOGY | Facility: CLINIC | Age: 67
End: 2022-07-08

## 2022-07-08 NOTE — TELEPHONE ENCOUNTER
Called pt to discuss results of [address]  No suspicious lymph nodes detected  Will proceed with WLE and SLN bx  All questions answered

## 2022-07-12 ENCOUNTER — TELEPHONE (OUTPATIENT)
Dept: SURGICAL ONCOLOGY | Facility: CLINIC | Age: 67
End: 2022-07-12

## 2022-07-12 NOTE — TELEPHONE ENCOUNTER
Tc to pt to discuss new surgery date  Explained to pt that in order to coordinate an inject time for surgery we need to move his surgery date from 7/25 to 7/29  Pt verbalized understanding  He wanted to know how long surgery would take  Explained to him that we typically block off 2 hours for the surgery  Pt verbalized understanding and appreciative of call  Instructed to call back with any questions or concerns

## 2022-07-14 ENCOUNTER — TELEPHONE (OUTPATIENT)
Dept: HEMATOLOGY ONCOLOGY | Facility: CLINIC | Age: 67
End: 2022-07-14

## 2022-07-14 ENCOUNTER — APPOINTMENT (OUTPATIENT)
Dept: LAB | Facility: MEDICAL CENTER | Age: 67
End: 2022-07-14
Payer: MEDICARE

## 2022-07-14 DIAGNOSIS — D59.10 AUTOIMMUNE HEMOLYTIC ANEMIA (HCC): ICD-10-CM

## 2022-07-14 LAB
ERYTHROCYTE [DISTWIDTH] IN BLOOD BY AUTOMATED COUNT: 21.3 % (ref 11.6–15.1)
HCT VFR BLD AUTO: 22.6 % (ref 36.5–49.3)
HGB BLD-MCNC: 7.8 G/DL (ref 12–17)
MCH RBC QN AUTO: 50.6 PG (ref 26.8–34.3)
MCHC RBC AUTO-ENTMCNC: 34.5 G/DL (ref 31.4–37.4)
MCV RBC AUTO: 147 FL (ref 82–98)
PLATELET # BLD AUTO: 603 THOUSANDS/UL (ref 149–390)
PMV BLD AUTO: 9.6 FL (ref 8.9–12.7)
RBC # BLD AUTO: 1.54 MILLION/UL (ref 3.88–5.62)
WBC # BLD AUTO: 14.67 THOUSAND/UL (ref 4.31–10.16)

## 2022-07-14 PROCEDURE — 36415 COLL VENOUS BLD VENIPUNCTURE: CPT

## 2022-07-14 PROCEDURE — 85027 COMPLETE CBC AUTOMATED: CPT

## 2022-07-14 NOTE — TELEPHONE ENCOUNTER
CALL RETURN FORM   Reason for patient call? Patient would like to discuss lab results taken today 7/14   Patient's primary oncologist? Anjel Patterson    Name of person the patient was calling for? Chioma   Any additional information to add, if applicable? Please call [phone]   Informed patient that the message will be forwarded to the team and someone will get back to them as soon as possible    Did you relay this information to the patient?   yes

## 2022-07-14 NOTE — TELEPHONE ENCOUNTER
Spoke with patient to discuss blood work results  Patient has been taking 60mg prednisone PO daily for the last week and his Hgb dropped again  Patient wondering what to do next  I told the patient I will call him back when we come up with a plan  Patient verbalized understanding

## 2022-07-15 ENCOUNTER — TELEPHONE (OUTPATIENT)
Dept: HEMATOLOGY ONCOLOGY | Facility: CLINIC | Age: 67
End: 2022-07-15

## 2022-07-15 ENCOUNTER — APPOINTMENT (OUTPATIENT)
Dept: LAB | Facility: HOSPITAL | Age: 67
End: 2022-07-15
Payer: MEDICARE

## 2022-07-15 DIAGNOSIS — D59.10 AUTOIMMUNE HEMOLYTIC ANEMIA (HCC): Primary | ICD-10-CM

## 2022-07-15 PROCEDURE — 86900 BLOOD TYPING SEROLOGIC ABO: CPT

## 2022-07-15 PROCEDURE — 36415 COLL VENOUS BLD VENIPUNCTURE: CPT

## 2022-07-15 PROCEDURE — 86901 BLOOD TYPING SEROLOGIC RH(D): CPT

## 2022-07-15 PROCEDURE — 86850 RBC ANTIBODY SCREEN: CPT

## 2022-07-15 PROCEDURE — 86870 RBC ANTIBODY IDENTIFICATION: CPT

## 2022-07-15 RX ORDER — SODIUM CHLORIDE 9 MG/ML
20 INJECTION, SOLUTION INTRAVENOUS ONCE
Status: CANCELLED | OUTPATIENT
Start: 2022-07-18

## 2022-07-15 RX ORDER — SODIUM CHLORIDE 9 MG/ML
20 INJECTION, SOLUTION INTRAVENOUS ONCE
Status: CANCELLED | OUTPATIENT
Start: 2022-07-15

## 2022-07-15 NOTE — TELEPHONE ENCOUNTER
CALL RETURN FORM   Reason for patient call? Patient would like to discuss lab results taken 7/14  He states he's been waiting for a callback since yesterday     Patient's primary oncologist? Dr Sagar Tran    Name of person the patient was calling for? Dr Sagar Tran    Any additional information to add, if applicable? [phone]   Informed patient that the message will be forwarded to the team and someone will get back to them as soon as possible    Did you relay this information to the patient?  yes

## 2022-07-15 NOTE — TELEPHONE ENCOUNTER
Spoke with patient to let him know to continue on the 60mg Prednisone and that Dr Nicolasa Rios would like him to get a blood transfusion since he is dropping  I informed the patient that he will need to go to the [facility] to get his type and screen completed either today or tomorrow  Patient verbalized understanding and will be at his blood transfusion on Monday at 1230

## 2022-07-18 ENCOUNTER — HOSPITAL ENCOUNTER (OUTPATIENT)
Dept: INFUSION CENTER | Facility: CLINIC | Age: 67
Discharge: HOME | End: 2022-07-18
Payer: MEDICARE

## 2022-07-18 VITALS
RESPIRATION RATE: 18 BRPM | TEMPERATURE: 98.8 F | SYSTOLIC BLOOD PRESSURE: 150 MMHG | HEART RATE: 71 BPM | DIASTOLIC BLOOD PRESSURE: 81 MMHG

## 2022-07-18 DIAGNOSIS — D59.10 AUTOIMMUNE HEMOLYTIC ANEMIA (HCC): Primary | ICD-10-CM

## 2022-07-18 PROCEDURE — P9058 RBC, L/R, CMV-NEG, IRRAD: HCPCS

## 2022-07-18 PROCEDURE — 36430 TRANSFUSION BLD/BLD COMPNT: CPT

## 2022-07-18 PROCEDURE — 86922 COMPATIBILITY TEST ANTIGLOB: CPT

## 2022-07-18 PROCEDURE — 86921 COMPATIBILITY TEST INCUBATE: CPT

## 2022-07-18 RX ORDER — SODIUM CHLORIDE 9 MG/ML
20 INJECTION, SOLUTION INTRAVENOUS ONCE
Status: COMPLETED | OUTPATIENT
Start: 2022-07-18 | End: 2022-07-18

## 2022-07-18 RX ADMIN — SODIUM CHLORIDE 20 ML/HR: 0.9 INJECTION, SOLUTION INTRAVENOUS at 12:24

## 2022-07-19 LAB
ABO GROUP BLD BPU: NORMAL
BPU ID: NORMAL
CROSSMATCH: NORMAL
UNIT DISPENSE STATUS: NORMAL
UNIT PRODUCT CODE: NORMAL
UNIT PRODUCT VOLUME: 350 ML
UNIT RH: NORMAL

## 2022-07-21 ENCOUNTER — APPOINTMENT (OUTPATIENT)
Dept: LAB | Facility: MEDICAL CENTER | Age: 67
End: 2022-07-21
Payer: MEDICARE

## 2022-07-21 ENCOUNTER — TELEPHONE (OUTPATIENT)
Dept: HEMATOLOGY ONCOLOGY | Facility: CLINIC | Age: 67
End: 2022-07-21

## 2022-07-21 ENCOUNTER — ANESTHESIA EVENT (OUTPATIENT)
Dept: PERIOP | Facility: HOSPITAL | Age: 67
DRG: 570 | End: 2022-07-21
Payer: MEDICARE

## 2022-07-21 ENCOUNTER — CLINICAL SUPPORT (OUTPATIENT)
Dept: URGENT CARE | Facility: MEDICAL CENTER | Age: 67
End: 2022-07-21

## 2022-07-21 DIAGNOSIS — D59.10 HEMOLYTIC ANEMIA DUE TO ANTIBODY (HCC): ICD-10-CM

## 2022-07-21 DIAGNOSIS — C43.71 MALIGNANT MELANOMA OF LEG, RIGHT (HCC): ICD-10-CM

## 2022-07-21 DIAGNOSIS — D50.0 IRON DEFICIENCY ANEMIA SECONDARY TO BLOOD LOSS (CHRONIC): Primary | ICD-10-CM

## 2022-07-21 LAB
ATRIAL RATE: 91 BPM
ERYTHROCYTE [DISTWIDTH] IN BLOOD BY AUTOMATED COUNT: 27.3 % (ref 11.6–15.1)
HCT VFR BLD AUTO: 25.6 % (ref 36.5–49.3)
HGB BLD-MCNC: 8.7 G/DL (ref 12–17)
MCH RBC QN AUTO: 48.1 PG (ref 26.8–34.3)
MCHC RBC AUTO-ENTMCNC: 34 G/DL (ref 31.4–37.4)
MCV RBC AUTO: 141 FL (ref 82–98)
P AXIS: 44 DEGREES
PLATELET # BLD AUTO: 549 THOUSANDS/UL (ref 149–390)
PMV BLD AUTO: 9.6 FL (ref 8.9–12.7)
PR INTERVAL: 152 MS
QRS AXIS: 23 DEGREES
QRSD INTERVAL: 78 MS
QT INTERVAL: 358 MS
QTC INTERVAL: 440 MS
RBC # BLD AUTO: 1.81 MILLION/UL (ref 3.88–5.62)
T WAVE AXIS: 19 DEGREES
VENTRICULAR RATE: 91 BPM
WBC # BLD AUTO: 14.45 THOUSAND/UL (ref 4.31–10.16)

## 2022-07-21 PROCEDURE — 85027 COMPLETE CBC AUTOMATED: CPT

## 2022-07-21 PROCEDURE — 36415 COLL VENOUS BLD VENIPUNCTURE: CPT

## 2022-07-21 NOTE — TELEPHONE ENCOUNTER
CALL RETURN FORM   Reason for patient call? Patient is calling requesting someone to reach out to him to discuss his CBC results from today   Patient's primary oncologist? Dr Helen Danielson   Name of person the patient was calling for? Dr Helen Danielson   Any additional information to add, if applicable? N/A   Informed patient that the message will be forwarded to the team and someone will get back to them as soon as possible    Did you relay this information to the patient?  Yes

## 2022-07-21 NOTE — TELEPHONE ENCOUNTER
Returned call to patient to let him know that he can stay on the 60 mg of prednisone and to get his blood work done again next week  Patient stated he gets surgery next Friday with Dr Clara Torres office and is wondering if he would need a blood transfusion before his surgery  I informed him to call their office to see what they prefer him to do  Patient verbalized understanding

## 2022-07-21 NOTE — TELEPHONE ENCOUNTER
Returned call to pt after speaking with Dr Brent Dudley about his lab work  Explained to him that he is okay to proceed with surgery for his melanoma on 7/29  Pt verbalized understanding

## 2022-07-21 NOTE — TELEPHONE ENCOUNTER
CALL TRANSFER   Reason for patient call? Patient calling to speak about his most recent lab results prior to surgery  Patient's primary physician? Dr Rafaela Yates RN call was transferred to and time it was transferred? Grace, 3:23pm   Informed patient that the message will be forwarded to the team and someone will get back to them as soon as possible    Did you relay this information to the patient?   Yes

## 2022-07-25 LAB
ATRIAL RATE: 91 BPM
P AXIS: 44 DEGREES
PR INTERVAL: 152 MS
QRS AXIS: 23 DEGREES
QRSD INTERVAL: 78 MS
QT INTERVAL: 358 MS
QTC INTERVAL: 440 MS
T WAVE AXIS: 19 DEGREES
VENTRICULAR RATE: 91 BPM

## 2022-07-25 NOTE — PRE-PROCEDURE INSTRUCTIONS
Pre-Surgery Instructions:   Medication Instructions    acetaminophen (TYLENOL) 325 mg tablet Uses PRN- OK to take day of surgery    ascorbic acid (VITAMIN C) 1000 MG tablet Stop taking 7 days prior to surgery   dabigatran etexilate (PRADAXA) 75 mg capsule Instructions provided by MD   [facility] ([facility]DR[facility]IUR[facility]) 25 mg tablet Hold day of surgery   metoprolol succinate (TOPROL-XL) 25 mg 24 hr tablet Take day of surgery   pantoprazole (PROTONIX) 40 mg tablet Take day of surgery   potassium chloride (K-DUR,KLOR-CON) 20 mEq tablet Hold day of surgery   prazosin (MINIPRESS) 1 mg capsule Take night before surgery    predniSONE 20 mg tablet Hold day of surgery   VITAMIN D PO Stop taking 7 days prior to surgery  Reviewed with patient, in detail, instructions from "My Surgical Experience"  Instructed to avoid all  OTC vitamins/supplements and NSAIDS for one week prior to surgery per anesthesia guidelines  Tylenol ok to take PRN  Advised patient that [facility] Wright will call with surgery arrival time and hospital directions the business day prior to surgery  Advised patient nothing eat or drink after midnight prior to surgery  Instructed to call surgeon's office in meantime with any new illnesses/exposure  Patient verbalized understanding of current visitor restrictions/masking guidelines and advised that he/she can confirm these at time of arrival call with Rogers Wright  Patient verbalized understanding and knows to call surgeon's office with any additional questions prior to surgery

## 2022-07-27 ENCOUNTER — TELEPHONE (OUTPATIENT)
Dept: HEMATOLOGY ONCOLOGY | Facility: CLINIC | Age: 67
End: 2022-07-27

## 2022-07-27 ENCOUNTER — APPOINTMENT (OUTPATIENT)
Dept: LAB | Facility: MEDICAL CENTER | Age: 67
DRG: 570 | End: 2022-07-27
Payer: MEDICARE

## 2022-07-27 LAB
BLOOD GROUP ANTIBODIES SERPL: NORMAL
BLOOD GROUP ANTIBODIES SERPL: NORMAL

## 2022-07-27 NOTE — TELEPHONE ENCOUNTER
CALL RETURN FORM   Reason for patient call? Patient calling about lab work results   Patient's primary oncologist?  Christiane Cha   Name of person the patient was calling for? Chioma   Any additional information to add, if applicable? Please call [phone]   Informed patient that the message will be forwarded to the team and someone will get back to them as soon as possible    Did you relay this information to the patient?   yes

## 2022-07-28 ENCOUNTER — TELEPHONE (OUTPATIENT)
Dept: HEMATOLOGY ONCOLOGY | Facility: CLINIC | Age: 67
End: 2022-07-28

## 2022-07-28 ENCOUNTER — TELEPHONE (OUTPATIENT)
Dept: SURGICAL ONCOLOGY | Facility: CLINIC | Age: 67
End: 2022-07-28

## 2022-07-28 NOTE — TELEPHONE ENCOUNTER
Spoke with patient regarding his low Hgb and told him that Dr Ran Mosqueda would like him to stay at 60mg of the Prednisone and to check his cbc again next week  Patient is concerned with procedure tomorrow  I told him that I will reach out to Grace, the nurse with Dr Elda Lowe and have her reach out to him to assure him his Hgb is OK for tomorrow  Patient verbalized understanding

## 2022-07-28 NOTE — TELEPHONE ENCOUNTER
Received message from Dr Alecia Storey, about pt being concerned going into surgery with his hemoglobin levels  Tc to pt and reassured him that surgery was still scheduled for tomorrow  His labs can be monitored following surgery to watch his hemoglobin  Pt verbalized understanding  He stated he got a call to report to the hospital at [facility] for a 1pm surgery  Explained to pt that from what I can see, his surgery is scheduled for 9am  Encouraged the pt to call back and clarify the time in case things have changed  Pt verbalized understanding

## 2022-07-28 NOTE — TELEPHONE ENCOUNTER
CALL RETURN FORM   Reason for patient call? Patient is calling requesting someone to reach out to him to discuss his CBC results from today since he is scheduled for surgery tomorrow    Patient's primary oncologist? Dr Marquis Hurt   Name of person the patient was calling for? Erica    Any additional information to add, if applicable? N/A   Informed patient that the message will be forwarded to the team and someone will get back to them as soon as possible     Did you relay this information to the patient?  Yes, patient can be reached back at [phone]

## 2022-07-29 ENCOUNTER — ANESTHESIA (OUTPATIENT)
Dept: PERIOP | Facility: HOSPITAL | Age: 67
DRG: 570 | End: 2022-07-29
Payer: MEDICARE

## 2022-07-29 ENCOUNTER — APPOINTMENT (OUTPATIENT)
Dept: RADIOLOGY | Facility: HOSPITAL | Age: 67
DRG: 570 | End: 2022-07-29
Attending: STUDENT IN AN ORGANIZED HEALTH CARE EDUCATION/TRAINING PROGRAM
Payer: MEDICARE

## 2022-07-29 ENCOUNTER — APPOINTMENT (INPATIENT)
Dept: RADIOLOGY | Facility: HOSPITAL | Age: 67
DRG: 570 | End: 2022-07-29
Payer: MEDICARE

## 2022-07-29 ENCOUNTER — HOSPITAL ENCOUNTER (INPATIENT)
Facility: HOSPITAL | Age: 67
LOS: 7 days | Discharge: HOME | DRG: 570 | End: 2022-08-05
Attending: STUDENT IN AN ORGANIZED HEALTH CARE EDUCATION/TRAINING PROGRAM | Admitting: STUDENT IN AN ORGANIZED HEALTH CARE EDUCATION/TRAINING PROGRAM
Payer: MEDICARE

## 2022-07-29 DIAGNOSIS — D59.10 AUTOIMMUNE HEMOLYTIC ANEMIA (HCC): ICD-10-CM

## 2022-07-29 DIAGNOSIS — Z86.711 HX OF PULMONARY EMBOLUS: ICD-10-CM

## 2022-07-29 DIAGNOSIS — M17.11 PRIMARY LOCALIZED OSTEOARTHROSIS OF THE KNEE, RIGHT: ICD-10-CM

## 2022-07-29 DIAGNOSIS — D84.821 IMMUNOSUPPRESSION DUE TO CHRONIC STEROID USE (HCC): ICD-10-CM

## 2022-07-29 DIAGNOSIS — T38.0X5A IMMUNOSUPPRESSION DUE TO CHRONIC STEROID USE (HCC): ICD-10-CM

## 2022-07-29 DIAGNOSIS — I10 ESSENTIAL HYPERTENSION: ICD-10-CM

## 2022-07-29 DIAGNOSIS — D70.8 AUTO IMMUNE NEUTROPENIA (HCC): ICD-10-CM

## 2022-07-29 DIAGNOSIS — D59.11 HEMOLYTIC ANEMIA DUE TO WARM ANTIBODY (HCC): ICD-10-CM

## 2022-07-29 DIAGNOSIS — I26.09 OTHER PULMONARY EMBOLISM WITH ACUTE COR PULMONALE, UNSPECIFIED CHRONICITY (HCC): ICD-10-CM

## 2022-07-29 DIAGNOSIS — D59.10 AUTOIMMUNE HEMOLYTIC ANEMIA (HCC): Primary | ICD-10-CM

## 2022-07-29 DIAGNOSIS — C43.71 MALIGNANT MELANOMA OF LEG, RIGHT (HCC): ICD-10-CM

## 2022-07-29 DIAGNOSIS — R09.02 HYPOXIA: ICD-10-CM

## 2022-07-29 DIAGNOSIS — Z79.52 IMMUNOSUPPRESSION DUE TO CHRONIC STEROID USE (HCC): ICD-10-CM

## 2022-07-29 PROBLEM — J96.01 ACUTE RESPIRATORY FAILURE WITH HYPOXIA (HCC): Status: ACTIVE | Noted: 2022-07-29

## 2022-07-29 PROBLEM — R06.00 DYSPNEA: Status: ACTIVE | Noted: 2022-07-29

## 2022-07-29 LAB
ABO GROUP BLD: NORMAL
BLD GP AB SCN SERPL QL: POSITIVE
DAT C3-SP REAG RBC QL: NEGATIVE
DAT IGG-SP REAG RBCCO QL: NEGATIVE
DAT POLY-SP REAG RBC QL: NORMAL
ERYTHROCYTE [DISTWIDTH] IN BLOOD BY AUTOMATED COUNT: 25.3 % (ref 11.6–15.1)
HCT VFR BLD AUTO: 18.8 % (ref 36.5–49.3)
HGB BLD-MCNC: 6.4 G/DL (ref 12–17)
MCH RBC QN AUTO: 52 PG (ref 26.8–34.3)
MCHC RBC AUTO-ENTMCNC: 34 G/DL (ref 31.4–37.4)
MCV RBC AUTO: 153 FL (ref 82–98)
NRBC BLD AUTO-RTO: 29 /100 WBCS
PLATELET # BLD AUTO: 324 THOUSANDS/UL (ref 149–390)
PLATELET # BLD AUTO: 381 THOUSANDS/UL (ref 149–390)
PMV BLD AUTO: 9.2 FL (ref 8.9–12.7)
PMV BLD AUTO: 9.4 FL (ref 8.9–12.7)
RBC # BLD AUTO: 1.23 MILLION/UL (ref 3.88–5.62)
RH BLD: POSITIVE
SPECIMEN EXPIRATION DATE: NORMAL
WBC # BLD AUTO: 21.47 THOUSAND/UL (ref 4.31–10.16)

## 2022-07-29 PROCEDURE — 12034 INTMD RPR S/TR/EXT 7.6-12.5: CPT | Performed by: STUDENT IN AN ORGANIZED HEALTH CARE EDUCATION/TRAINING PROGRAM

## 2022-07-29 PROCEDURE — 30233N1 TRANSFUSION OF NONAUTOLOGOUS RED BLOOD CELLS INTO PERIPHERAL VEIN, PERCUTANEOUS APPROACH: ICD-10-PCS | Performed by: STUDENT IN AN ORGANIZED HEALTH CARE EDUCATION/TRAINING PROGRAM

## 2022-07-29 PROCEDURE — 38531 OPEN BX/EXC INGUINOFEM NODES: CPT | Performed by: STUDENT IN AN ORGANIZED HEALTH CARE EDUCATION/TRAINING PROGRAM

## 2022-07-29 PROCEDURE — 85027 COMPLETE CBC AUTOMATED: CPT | Performed by: STUDENT IN AN ORGANIZED HEALTH CARE EDUCATION/TRAINING PROGRAM

## 2022-07-29 PROCEDURE — 85049 AUTOMATED PLATELET COUNT: CPT | Performed by: PHYSICIAN ASSISTANT

## 2022-07-29 PROCEDURE — 88307 TISSUE EXAM BY PATHOLOGIST: CPT | Performed by: PATHOLOGY

## 2022-07-29 PROCEDURE — 38900 IO MAP OF SENT LYMPH NODE: CPT | Performed by: STUDENT IN AN ORGANIZED HEALTH CARE EDUCATION/TRAINING PROGRAM

## 2022-07-29 PROCEDURE — 07BH0ZX EXCISION OF RIGHT INGUINAL LYMPHATIC, OPEN APPROACH, DIAGNOSTIC: ICD-10-PCS | Performed by: STUDENT IN AN ORGANIZED HEALTH CARE EDUCATION/TRAINING PROGRAM

## 2022-07-29 PROCEDURE — 86900 BLOOD TYPING SEROLOGIC ABO: CPT | Performed by: ANESTHESIOLOGY

## 2022-07-29 PROCEDURE — 71045 X-RAY EXAM CHEST 1 VIEW: CPT

## 2022-07-29 PROCEDURE — 88342 IMHCHEM/IMCYTCHM 1ST ANTB: CPT | Performed by: PATHOLOGY

## 2022-07-29 PROCEDURE — 86922 COMPATIBILITY TEST ANTIGLOB: CPT

## 2022-07-29 PROCEDURE — 88341 IMHCHEM/IMCYTCHM EA ADD ANTB: CPT | Performed by: PATHOLOGY

## 2022-07-29 PROCEDURE — 0JBL0ZZ EXCISION OF RIGHT UPPER LEG SUBCUTANEOUS TISSUE AND FASCIA, OPEN APPROACH: ICD-10-PCS | Performed by: STUDENT IN AN ORGANIZED HEALTH CARE EDUCATION/TRAINING PROGRAM

## 2022-07-29 PROCEDURE — 38792 RA TRACER ID OF SENTINL NODE: CPT | Performed by: STUDENT IN AN ORGANIZED HEALTH CARE EDUCATION/TRAINING PROGRAM

## 2022-07-29 PROCEDURE — 86901 BLOOD TYPING SEROLOGIC RH(D): CPT | Performed by: ANESTHESIOLOGY

## 2022-07-29 PROCEDURE — 86880 COOMBS TEST DIRECT: CPT | Performed by: INTERNAL MEDICINE

## 2022-07-29 PROCEDURE — 88305 TISSUE EXAM BY PATHOLOGIST: CPT | Performed by: PATHOLOGY

## 2022-07-29 PROCEDURE — 99255 IP/OBS CONSLTJ NEW/EST HI 80: CPT | Performed by: INTERNAL MEDICINE

## 2022-07-29 PROCEDURE — A9541 TC99M SULFUR COLLOID: HCPCS

## 2022-07-29 PROCEDURE — G1004 CDSM NDSC: HCPCS

## 2022-07-29 PROCEDURE — 86921 COMPATIBILITY TEST INCUBATE: CPT

## 2022-07-29 PROCEDURE — 78195 LYMPH SYSTEM IMAGING: CPT

## 2022-07-29 PROCEDURE — 11606 EXC TR-EXT MAL+MARG >4 CM: CPT | Performed by: STUDENT IN AN ORGANIZED HEALTH CARE EDUCATION/TRAINING PROGRAM

## 2022-07-29 PROCEDURE — 86850 RBC ANTIBODY SCREEN: CPT | Performed by: ANESTHESIOLOGY

## 2022-07-29 PROCEDURE — 99222 1ST HOSP IP/OBS MODERATE 55: CPT | Performed by: INTERNAL MEDICINE

## 2022-07-29 RX ORDER — PROPOFOL 10 MG/ML
INJECTION, EMULSION INTRAVENOUS CONTINUOUS PRN
Status: DISCONTINUED | OUTPATIENT
Start: 2022-07-29 | End: 2022-07-29

## 2022-07-29 RX ORDER — TRAMADOL HYDROCHLORIDE 50 MG/1
50 TABLET ORAL EVERY 6 HOURS SCHEDULED
Status: DISCONTINUED | OUTPATIENT
Start: 2022-07-29 | End: 2022-08-05 | Stop reason: HOSPADM

## 2022-07-29 RX ORDER — METOPROLOL SUCCINATE 25 MG/1
12.5 TABLET, EXTENDED RELEASE ORAL EVERY MORNING
Status: DISCONTINUED | OUTPATIENT
Start: 2022-07-29 | End: 2022-08-05 | Stop reason: HOSPADM

## 2022-07-29 RX ORDER — MIDAZOLAM HYDROCHLORIDE 2 MG/2ML
INJECTION, SOLUTION INTRAMUSCULAR; INTRAVENOUS AS NEEDED
Status: DISCONTINUED | OUTPATIENT
Start: 2022-07-29 | End: 2022-07-29

## 2022-07-29 RX ORDER — SODIUM CHLORIDE, SODIUM LACTATE, POTASSIUM CHLORIDE, CALCIUM CHLORIDE 600; 310; 30; 20 MG/100ML; MG/100ML; MG/100ML; MG/100ML
125 INJECTION, SOLUTION INTRAVENOUS CONTINUOUS
Status: DISCONTINUED | OUTPATIENT
Start: 2022-07-29 | End: 2022-07-30

## 2022-07-29 RX ORDER — ONDANSETRON 2 MG/ML
4 INJECTION INTRAMUSCULAR; INTRAVENOUS EVERY 4 HOURS PRN
Status: DISCONTINUED | OUTPATIENT
Start: 2022-07-29 | End: 2022-08-05 | Stop reason: HOSPADM

## 2022-07-29 RX ORDER — MAGNESIUM HYDROXIDE 1200 MG/15ML
LIQUID ORAL AS NEEDED
Status: DISCONTINUED | OUTPATIENT
Start: 2022-07-29 | End: 2022-07-29 | Stop reason: HOSPADM

## 2022-07-29 RX ORDER — ACETAMINOPHEN 325 MG/1
975 TABLET ORAL EVERY 8 HOURS SCHEDULED
Status: DISCONTINUED | OUTPATIENT
Start: 2022-07-29 | End: 2022-08-05 | Stop reason: HOSPADM

## 2022-07-29 RX ORDER — CEFAZOLIN SODIUM 2 G/50ML
2000 SOLUTION INTRAVENOUS ONCE
Status: COMPLETED | OUTPATIENT
Start: 2022-07-29 | End: 2022-07-29

## 2022-07-29 RX ORDER — LIDOCAINE HYDROCHLORIDE 10 MG/ML
0.5 INJECTION, SOLUTION EPIDURAL; INFILTRATION; INTRACAUDAL; PERINEURAL ONCE
Status: COMPLETED | OUTPATIENT
Start: 2022-07-29 | End: 2022-07-29

## 2022-07-29 RX ORDER — POLYETHYLENE GLYCOL 3350 17 G/17G
17 POWDER, FOR SOLUTION ORAL DAILY
Status: DISCONTINUED | OUTPATIENT
Start: 2022-07-29 | End: 2022-08-04

## 2022-07-29 RX ORDER — OXYCODONE HYDROCHLORIDE 5 MG/1
5 TABLET ORAL EVERY 4 HOURS PRN
Status: DISCONTINUED | OUTPATIENT
Start: 2022-07-29 | End: 2022-08-05 | Stop reason: HOSPADM

## 2022-07-29 RX ORDER — FENTANYL CITRATE 50 UG/ML
INJECTION, SOLUTION INTRAMUSCULAR; INTRAVENOUS AS NEEDED
Status: DISCONTINUED | OUTPATIENT
Start: 2022-07-29 | End: 2022-07-29

## 2022-07-29 RX ORDER — PANTOPRAZOLE SODIUM 40 MG/1
40 TABLET, DELAYED RELEASE ORAL
Status: DISCONTINUED | OUTPATIENT
Start: 2022-07-30 | End: 2022-08-05 | Stop reason: HOSPADM

## 2022-07-29 RX ORDER — ONDANSETRON 2 MG/ML
INJECTION INTRAMUSCULAR; INTRAVENOUS AS NEEDED
Status: DISCONTINUED | OUTPATIENT
Start: 2022-07-29 | End: 2022-07-29

## 2022-07-29 RX ORDER — DIPHENHYDRAMINE HYDROCHLORIDE 50 MG/ML
25 INJECTION INTRAMUSCULAR; INTRAVENOUS ONCE
Status: DISCONTINUED | OUTPATIENT
Start: 2022-07-29 | End: 2022-07-30

## 2022-07-29 RX ORDER — POTASSIUM CHLORIDE 20 MEQ/1
20 TABLET, EXTENDED RELEASE ORAL EVERY MORNING
Status: DISCONTINUED | OUTPATIENT
Start: 2022-07-29 | End: 2022-08-03

## 2022-07-29 RX ORDER — HYDROCHLOROTHIAZIDE 25 MG/1
25 TABLET ORAL EVERY MORNING
Status: DISCONTINUED | OUTPATIENT
Start: 2022-07-29 | End: 2022-08-05 | Stop reason: HOSPADM

## 2022-07-29 RX ORDER — PREDNISONE 20 MG/1
60 TABLET ORAL DAILY
Status: DISCONTINUED | OUTPATIENT
Start: 2022-07-29 | End: 2022-07-30

## 2022-07-29 RX ORDER — FENTANYL CITRATE/PF 50 MCG/ML
25 SYRINGE (ML) INJECTION
Status: DISCONTINUED | OUTPATIENT
Start: 2022-07-29 | End: 2022-07-29 | Stop reason: HOSPADM

## 2022-07-29 RX ORDER — DOCUSATE SODIUM 100 MG/1
100 CAPSULE, LIQUID FILLED ORAL 2 TIMES DAILY
Status: DISCONTINUED | OUTPATIENT
Start: 2022-07-29 | End: 2022-08-04

## 2022-07-29 RX ORDER — ONDANSETRON 2 MG/ML
4 INJECTION INTRAMUSCULAR; INTRAVENOUS ONCE AS NEEDED
Status: DISCONTINUED | OUTPATIENT
Start: 2022-07-29 | End: 2022-07-29 | Stop reason: HOSPADM

## 2022-07-29 RX ORDER — HEPARIN SODIUM 5000 [USP'U]/ML
5000 INJECTION, SOLUTION INTRAVENOUS; SUBCUTANEOUS EVERY 8 HOURS SCHEDULED
Status: DISCONTINUED | OUTPATIENT
Start: 2022-07-29 | End: 2022-07-30

## 2022-07-29 RX ADMIN — DOCUSATE SODIUM 100 MG: 100 CAPSULE, LIQUID FILLED ORAL at 22:56

## 2022-07-29 RX ADMIN — MIDAZOLAM 2 MG: 1 INJECTION INTRAMUSCULAR; INTRAVENOUS at 09:51

## 2022-07-29 RX ADMIN — PROPOFOL 50 MCG/KG/MIN: 10 INJECTION, EMULSION INTRAVENOUS at 09:54

## 2022-07-29 RX ADMIN — POLYETHYLENE GLYCOL 3350 17 G: 17 POWDER, FOR SOLUTION ORAL at 22:56

## 2022-07-29 RX ADMIN — ONDANSETRON 4 MG: 2 INJECTION INTRAMUSCULAR; INTRAVENOUS at 10:36

## 2022-07-29 RX ADMIN — LIDOCAINE HYDROCHLORIDE 0.5 ML: 10 INJECTION, SOLUTION EPIDURAL; INFILTRATION; INTRACAUDAL; PERINEURAL at 07:42

## 2022-07-29 RX ADMIN — ACETAMINOPHEN 975 MG: 325 TABLET ORAL at 21:30

## 2022-07-29 RX ADMIN — SODIUM CHLORIDE, SODIUM LACTATE, POTASSIUM CHLORIDE, AND CALCIUM CHLORIDE 125 ML/HR: .6; .31; .03; .02 INJECTION, SOLUTION INTRAVENOUS at 07:44

## 2022-07-29 RX ADMIN — CEFAZOLIN SODIUM 2000 MG: 2 SOLUTION INTRAVENOUS at 09:52

## 2022-07-29 RX ADMIN — TRAMADOL HYDROCHLORIDE 50 MG: 50 TABLET, COATED ORAL at 18:32

## 2022-07-29 RX ADMIN — HEPARIN SODIUM 5000 UNITS: 5000 INJECTION INTRAVENOUS; SUBCUTANEOUS at 21:30

## 2022-07-29 RX ADMIN — SODIUM CHLORIDE, SODIUM LACTATE, POTASSIUM CHLORIDE, AND CALCIUM CHLORIDE 125 ML/HR: .6; .31; .03; .02 INJECTION, SOLUTION INTRAVENOUS at 16:44

## 2022-07-29 RX ADMIN — FENTANYL CITRATE 50 MCG: 50 INJECTION INTRAMUSCULAR; INTRAVENOUS at 09:55

## 2022-07-29 NOTE — ANESTHESIA POSTPROCEDURE EVALUATION
Post-Op Assessment Note    CV Status:  Stable  Pain Score: 0    Pain management: adequate     Mental Status:  Alert and awake   Hydration Status:  Stable   PONV Controlled:  None   Airway Patency:  Patent   Two or more mitigation strategies used for obstructive sleep apnea   Post Op Vitals Reviewed: Yes      Staff: Anesthesiologist         No complications documented  /87 (07/29/22 1330)    Temp      Pulse 86 (07/29/22 1330)   Resp 13 (07/29/22 1330)    SpO2 98 % (07/29/22 1330)        Pt remains on O2, lungs clear  Mild dyspnea but eating without difficulty  T/C pending  Suspect Hypoxia and dyspnea due to severe anemia

## 2022-07-29 NOTE — NURSING NOTE
Late entry due to patient care  ~[id number]: Orders received for blood transfusion for Hgb 6 4  Call received from blood bank, blood not available for "a day or two" due to patient's antibodies  Per blood bank, they would make Dr Sonya Cline and Dr Christiane Cha aware  Dr Nicole Haider, Dr Ruben Rivera, and Merlin Neves [place] aware of delay in blood availability

## 2022-07-29 NOTE — CONSULTS
Medical Oncology/Hematology Consult Note  Rubén Farmer, male, 75 y o , 1955,  OR POOL/OR POOL, [phone]     Assessment and Plan    1  Autoimmune hemolytic anemia  · Dx in October of 2016 after his family found him to be jaundiced  · Splenectomy in May 2017  · Rituximab and cyclophosphamide infusions in 2018  · Chronic prednisone therapy  Most recently (7/06) was started on prednisone 60 mg daily  · Antibody identification: Positive for both warm and cold autoantibodies  · For the past month, patient has been experiencing fatigue and SOB due to persentent anemia with an Hg in the 7/8s for which he received a blood transfusion on 7/18  A slight increase in Hg was achieved, however has dropped over recent days  · Today, Hg is 6 4,  (likely due to cold autoantibodies)  · 7/05 Iron panel: Ferritin 370, Iron 243, Sat 57%, TIBC 423  · 7/05 CMP: Tbili 4 3, LFTs WNL  · Jaundiced on exam      Plan:  · Await RBC transfusion  · Taper prednisone from 60 to 40 mg  F/u outpatient  · F/u CEE, Ldh, haptoglobin, hepatic function, retic count  · Will consider inpatient IVIG pending results    2  Malignant melanoma  · T4 melanoma on right medial thigh  Nodule was first noticed by the patient 7-8 months ago  [place] rapidly into a blood blister  Shave biopsy resulted in melanoma with positive deep margins  · 4/22 CT abdomen/pelvis: slight increased relative density of liver, likely representing hepatic steatosis from steroid use  Minimal interval enlargement of para-aortic and bilateral common iliac lymph nodes (prior CT 3/22)  · Of note, patient reports his right leg was swollen "like an elephant" a couple of weeks ago, which gradually resolved on its own  · Excision and sentinel lymph node biopsy today  Plan:  · F/u outpatient  · Tissue exam pending  Outpatient follow up plan: Heme/Onc appointment scheduled with Anette Sotomayor on 10/13/22, [place]  Patient known to Dr Osullivan Orn       Communication with patient/family:  I did update the patient  I did review this patient with Dr Marshall Camejo and he is in agreement with this plan  Reason for consultation: Autoimmune hemolytic anemia and malignant melanoma  History of present illness:  Lin Santos is a 75 y o  male with a PMHx of autoimmune hemolytic anemia dx in 2016, RBC transfusions, splenectomy in May 2017, DVT/PE and portal vein thrombosis on Pradaxa, malignant melanoma of the right medial thigh, arthritis s/p bilateral knee replacements, essential HTN, and GERD who presents electively for excision and sentinel lymph node biopsy of the melanoma  For the past month, however, the patient has been experiencing fatigue and SOB due to persentent anemia with an Hg in the 7/8s for which he received a blood transfusion on 7/18  A slight increase in Hg was achieved, however has dropped over recent days, now to 6 4  He has been found to have both warm and cold autoantibodies  He has undergone rituximab and cyclophosphamide infusions in 2018, however chronic steroid therapy has been the most effective therapy  Most recently (7/06), he was started on 60 mg prednisone daily  At the bedside, the patient reports feeling well post-operatively with the exception of SOB  He is currently on 4 5L NC  Displays some conversational dyspnea  Reports his SOB has been worsening over the past month as aforementioned  Also reports his right leg was swollen "like an elephant" two weeks ago, which slowly resolved on its own  He denies changes in his appetite, however states that he is usually "ravenous" when he is on high dose steroids and has not experienced this increase in hunger with this most recent course of prednisone therapy  Review of Systems:  Review of Systems   Constitutional: Positive for fatigue  Negative for appetite change, chills, diaphoresis, fever and unexpected weight change  Eyes: Negative for visual disturbance     Respiratory: Positive for shortness of breath  Cardiovascular: Positive for leg swelling  Negative for chest pain  Gastrointestinal: Negative for abdominal distention and abdominal pain  Genitourinary: Negative for dysuria  Neurological: Negative for headaches  All other systems reviewed and are negative  Oncology History:   Cancer Staging  No matching staging information was found for the patient    Oncology History   Malignant melanoma of leg, right ([facility] )   5/31/2022 Biopsy    Right Leg, Shave Biopsy   Melanoma extending to the deep specimen margin  Thickness: 7 0mm  Ulceration: not seen   Mitoses: 3      7/1/2022 Initial Diagnosis    Malignant melanoma of leg, right ([facility] )         Past Medical History:   Past Medical History:   Diagnosis Date    Anemia 11/2/2016    Anxiety     Arthritis     Autoimmune hemolytic anemia ([facility] [facility] )     Claustrophobia     COVID 12/2020    DVT (deep venous thrombosis) (HCC)     GERD (gastroesophageal reflux disease)     Hearing loss, right     Hemolytic anemia ([facility] [facility] )     History of transfusion     2018 - no adverse reaction    Hypertension     [facility] 7/1/2022    Palpitation     Portal vein thrombosis     PTSD (post-traumatic stress disorder)     Pulmonary emboli (HCC)     Tobacco abuse        Past Surgical History:   Procedure Laterality Date    CATARACT EXTRACTION Bilateral     CHOLECYSTECTOMY  7/18/2017    COLONOSCOPY      ELBOW ARTHROPLASTY Left     bursectomy    JOINT REPLACEMENT Right 2/2/2021    knee    KNEE SURGERY Right     meniscus tear    NC LAP,CHOLECYSTECTOMY/GRAPH N/A 12/23/2017    Procedure: CHOLECYSTECTOMY LAPAROSCOPIC with cholangiogram;  Surgeon: Roma Walker MD;  [facility];  Service: General    NC REMOVAL SPLEEN, TOTAL N/A 5/18/2017    Procedure: LAPAROSCOPIC HAND ASSIST SPLENECTOMY;  Surgeon: Amanda Aguayo MD;  [facility];  Service: Surgical Oncology    NC TOTAL KNEE ARTHROPLASTY Right 2/2/2021    Procedure: ARTHROPLASTY KNEE TOTAL;  Surgeon: Haily Mann MD;  [facility];  Service: Orthopedics    HI TOTAL KNEE ARTHROPLASTY Left 11/17/2021    Procedure: TOTAL KNEE REPLACEMENT;  Surgeon: Haily Mann MD;  [facility];  Service: Orthopedics    SHOULDER SURGERY Left     rotator cuff x4, reconstruction       Family History   Problem Relation Age of Onset   Kay Liming Cancer Mother     Breast cancer Mother     Other Father         CABG    Heart attack Paternal Grandfather         acute MI       Social History     Socioeconomic History    Marital status: /Civil Union     Spouse name: None    Number of children: None    Years of education: None    Highest education level: None   Occupational History    None   Tobacco Use    Smoking status: Current Some Day Smoker     Packs/day: 0 00     Years: 5 00     Pack years: 0 00     Types: Cigars    Smokeless tobacco: Current User     Types: Snuff    Tobacco comment: 5  a week average   Vaping Use    Vaping Use: Never used   Substance and Sexual Activity    Alcohol use:  Yes     Alcohol/week: 6 0 standard drinks     Types: 6 Cans of beer per week     Comment: socially    Drug use: Yes     Frequency: 3 0 times per week     Types: Marijuana     Comment: medical marijuana    Sexual activity: Yes     Partners: Female     Birth control/protection: None   Other Topics Concern    None   Social History Narrative    Daily coffee consumption (2 cups/day)    reitred     Social Determinants of Health     Financial Resource Strain: Not on file   Food Insecurity: Not on file   Transportation Needs: Not on file   Physical Activity: Not on file   Stress: Not on file   Social Connections: Not on file   Intimate Partner Violence: Not on file   Housing Stability: Not on file         Current Facility-Administered Medications:     acetaminophen (TYLENOL) tablet 975 mg, 975 mg, Oral, Q8H [address], Juwan Garcia PA-C    diphenhydrAMINE (BENADRYL) injection 25 mg, 25 mg, Intravenous, Once, Elian Viera PA-C    fentaNYL (SUBLIMAZE) injection 25 mcg, 25 mcg, Intravenous, Q5 Min PRN, Giana Rodriguez CRNA    heparin (porcine) subcutaneous injection 5,000 Units, 5,000 Units, Subcutaneous, Q8H [address] **AND** Platelet count, , , Once, Elian Viera PA-C    hydrochlorothiazide (HYDRODIURIL) tablet 25 mg, 25 mg, Oral, QAM, Nara Hector PA-C    lactated ringers infusion, 125 mL/hr, Intravenous, Continuous, Iza Phillips, Last Rate: 125 mL/hr at 07/29/22 1113, 125 mL/hr at 07/29/22 1113    metoprolol succinate (TOPROL-XL) 24 hr tablet 12 5 mg, 12 5 mg, Oral, QAM, Nara Hector PA-C    ondansetron [facility]) injection 4 mg, 4 mg, Intravenous, Once PRN, Giana Rodriguez CRNA    ondansetron [facility]) injection 4 mg, 4 mg, Intravenous, Q4H PRN, Nara Hector PA-C    oxyCODONE (ROXICODONE) IR tablet 5 mg, 5 mg, Oral, Q4H PRN, Nara Hector PA-C    pantoprazole (PROTONIX) EC tablet 40 mg, 40 mg, Oral, QAM, Nara Hector PA-C    potassium chloride (K-DUR,KLOR-CON) CR tablet 20 mEq, 20 mEq, Oral, QAM, Nara Hector PA-C    predniSONE tablet 60 mg, 60 mg, Oral, Daily, Nara Hector PA-C    traMADol Tiburcio Butts) tablet 50 mg, 50 mg, Oral, Q6H [address], Elian Viera PA-C    Medications Prior to Admission   Medication    acetaminophen (TYLENOL) 325 mg tablet    ascorbic acid (VITAMIN C) 1000 MG tablet    dabigatran etexilate (PRADAXA) 75 mg capsule    hydrochlorothiazide (HYDRODIURIL) 25 mg tablet    metoprolol succinate (TOPROL-XL) 25 mg 24 hr tablet    pantoprazole (PROTONIX) 40 mg tablet    potassium chloride (K-DUR,KLOR-CON) 20 mEq tablet    prazosin (MINIPRESS) 1 mg capsule    predniSONE 20 mg tablet    VITAMIN D PO    traMADol (Ultram) 50 mg tablet       No Known Allergies      Physical Exam:     /87   Pulse 86   Temp 97 8 °F (36 6 °C)   Resp 13   Ht 5' 7" (1 702 m)   Wt 87 5 kg (193 lb)   SpO2 98%   BMI 30 23 kg/m²     Physical Exam  Vitals reviewed  Constitutional:       General: He is not in acute distress  Appearance: He is not toxic-appearing or diaphoretic  HENT:      Head: Normocephalic  Mouth/Throat:      Mouth: Mucous membranes are moist    Eyes:      General: Scleral icterus present  Conjunctiva/sclera: Conjunctivae normal    Neck:      Comments: No cervical, preclavicular, or axillary adenopathy  Cardiovascular:      Rate and Rhythm: Normal rate and regular rhythm  Pulses: Normal pulses  Heart sounds: Normal heart sounds  No murmur heard  No friction rub  No gallop  Pulmonary:      Effort: No respiratory distress  Breath sounds: Normal breath sounds  No wheezing, rhonchi or rales  Abdominal:      General: Bowel sounds are normal  There is no distension  Palpations: Abdomen is soft  Tenderness: There is no abdominal tenderness  There is no guarding  Hernia: A hernia is present  Comments: Central adiposity, ventral hernia   Musculoskeletal:      Right lower leg: Edema present  Left lower leg: No edema  Skin:     General: Skin is warm and dry  Coloration: Skin is jaundiced  Neurological:      Mental Status: He is alert and oriented to person, place, and time     Psychiatric:         Mood and Affect: Mood normal          Behavior: Behavior normal          Recent Results (from the past 48 hour(s))   CBC and differential    Collection Time: 07/29/22  7:03 AM   Result Value Ref Range    WBC 21 47 (H) 4 31 - 10 16 Thousand/uL    RBC 1 23 (L) 3 88 - 5 62 Million/uL    Hemoglobin 6 4 (LL) 12 0 - 17 0 g/dL    Hematocrit 18 8 (L) 36 5 - 49 3 %     (H) 82 - 98 fL    MCH 52 0 (H) 26 8 - 34 3 pg    MCHC 34 0 31 4 - 37 4 g/dL    RDW 25 3 (H) 11 6 - 15 1 %    MPV 9 4 8 9 - 12 7 fL    Platelets 812 028 - 292 Thousands/uL    nRBC 29 /100 WBCs   Type and screen Collection Time: 07/29/22  9:35 AM   Result Value Ref Range    ABO Grouping A     Rh Factor Positive     Antibody Screen Positive     Specimen Expiration Date [id number]        NM lymphatic melanoma    Result Date: 7/29/2022  Narrative: SENTINEL NODE LYMPHOSCINTIGRAPHY INDICATION:   Lower extremity melanoma  FINDINGS: 0 55 mCi Tc-99m sulfur colloid (0 6 cc volume) was administered intradermally in divided doses by Dr Geneva Chavez in the region of the patients right lower extremity melanoma  Scintigraphic images were obtained over the right groin and right lower extremity in multiple projections  Single node is identified within the right groin Using scintigraphic guidance, the corresponding skin site was marked with an indelible marker  The patient was transferred to the operating room in satisfactory condition  Impression: 1  [place] lymph node localized to right inguinal region  Workstation performed: [id number]     US inguinal area    Addendum Date: 7/6/2022 Addendum:   ADDENDUM: Images were also obtained through the right groin/inguinal region with no evidence for enlarged lymph nodes  Result Date: 7/6/2022  Narrative: RIGHT ANTERIOR THIGH ULTRASOUND INDICATION:   C43 71: Malignant melanoma of right lower limb, including hip  COMPARISON:  Correlation is made with the prior CT study dated 4/30/2022  TECHNIQUE:   Real-time ultrasound of the right anterior thigh was performed with a linear transducer with both volumetric sweeps and still imaging techniques  FINDINGS:  There is a skin lesion demonstrating posterior shadowing measuring approximately 1 3 cm in width with a depth of 0 8 cm likely compatible with known melanoma  No adjacent enlarged lymph nodes are seen in this region  Impression: Skin lesion as above most likely compatible with known melanoma  No adjacent enlarged lymph nodes  Workstation performed: [id number]       Labs and pertinent reports reviewed        This note has been generated by voice recognition software system  Therefore, there may be spelling, grammar, and or syntax errors  Please contact if questions arise

## 2022-07-29 NOTE — OP NOTE
OPERATIVE REPORT  PATIENT NAME: Kee Nicole    :  1955  MRN: [id number]  Pt Location: [facility] OR ROOM 06    SURGERY DATE: 2022    Surgeon(s) and Role:     * Diego Humphrey MD - Primary    Preop Diagnosis:  Malignant melanoma of leg, right ([address] ) [C43 71]    Post-Op Diagnosis Codes:     * Malignant melanoma of leg, right ([address] ) [C43 71]    Procedure(s) (LRB):  WIDE EXCISION RIGHT MEDIAL THIGH (Right)  BIOPSY LYMPH NODE SENTINEL (Right)    Specimen(s):  ID Type Source Tests Collected by Time Destination   1 : [place] Node #1 Tissue Lymph Node, [place] TISSUE EXAM Diego Humphrey MD 2022 1018    2 : Knee melanoma Tissue Leg, Right TISSUE EXAM Diego Humphrey MD 2022 1034        Estimated Blood Loss:   Minimal    Drains:  * No LDAs found *    Anesthesia Type:   General    Operative Indications:  Malignant melanoma of leg, right ([address] ) [C43 71]      Operative Findings:  One sentinel node; uneventful WLE    Complications:   None    Procedure and Technique:  The patient was met in the scintigraphy  The site of mapping was marked at the right groin  The patient was then transported to preop and finally to the operating room  The patient was identified and anesthesia induced  The patient's right knee melanoma was injected with 2 mL Lymphazurin  The patient's groin and right upper leg were prepped and draped in the usual sterile fashion after removal of the hair in this region  A 4 cm incision was made two finger breadths inferior to the inguinal ligament  The deeper tissues were divided with electrocautery down to the scarpas fascia which was divided with electrocautery  One lymph node was found which was both hot and blue  This lymph node was circumferentially dissected and the stalk tied off  It was then passed off field as sentinel lymph node 1  The rest of the groin was carefully checked for any signs of abnormality, blue dye, or elevated [place] counts  No other nodes were detected  The groin was then copiously irrigated and found to be hemostatic  Scarpas fascia as well as the deep dermal tissue were closed with 3-0 Vicryl  The skin was run with 4-0 Monocryl followed by skin glue  Steri-Strips were placed over the incision after the glue was dry  We then turned our attention to the primary melanoma of the right knee  The margins of the melanoma were marked at 1 6 x 2 cm  A circumferential margin of 2 cm was marked  The skin was incised sharply to the muscular fascia  The melanoma was then dissected off of the pre-musuclar fascia with electrocautery  The specimen was marked with a stitch at 12 o clock  Skin flaps were then raised both superiorly and inferiorly to facilitate a tension-free closure  Dog ear defects were removed  The final dimensions were 11 2 cm x 6 2 cm  The defect was copiously irrigated and found to be hemostatic  The deep dermal tissues were closed with interrupted 2-0 Vicryl suture  The epidermis was run with 3-0 monocryl  Several 2-0 nylon vertical mattress sutures were placed in order to relieve any remaining tension  The incision was then cleaned and dried  Triple antibiotic ointment was placed over the length of the incision and was covered with a sterile dressing of gauze and Tegaderm        I was present for the entire procedure    Patient Disposition:  PACU       SIGNATURE: Karen Holliday MD  DATE: July 29, 2022  TIME: 11:07 AM

## 2022-07-29 NOTE — PLAN OF CARE
Problem: MOBILITY - ADULT  Goal: Maintain or return to baseline ADL function  Description: INTERVENTIONS:  -  Assess patient's ability to carry out ADLs; assess patient's baseline for ADL function and identify physical deficits which impact ability to perform ADLs (bathing, care of mouth/teeth, toileting, grooming, dressing, etc )  - Assess/evaluate cause of self-care deficits   - Assess range of motion  - Assess patient's mobility; develop plan if impaired  - Assess patient's need for assistive devices and provide as appropriate  - Encourage maximum independence but intervene and supervise when necessary  - Involve family in performance of ADLs  - Assess for home care needs following discharge   - Consider OT consult to assist with ADL evaluation and planning for discharge  - Provide patient education as appropriate  Outcome: Progressing  Goal: Maintains/Returns to pre admission functional level  Description: INTERVENTIONS:  - Perform BMAT or MOVE assessment daily    - Set and communicate daily mobility goal to care team and patient/family/caregiver  - Collaborate with rehabilitation services on mobility goals if consulted  - Perform Range of Motion  times a day  - Reposition patient every  hours    - Dangle patient  times a day  - Stand patient  times a day  - Ambulate patient  times a day  - Out of bed to chair  times a day   - Out of bed for meals  times a day  - Out of bed for toileting  - Record patient progress and toleration of activity level   Outcome: Progressing     Problem: PAIN - ADULT  Goal: Verbalizes/displays adequate comfort level or baseline comfort level  Description: Interventions:  - Encourage patient to monitor pain and request assistance  - Assess pain using appropriate pain scale  - Administer analgesics based on type and severity of pain and evaluate response  - Implement non-pharmacological measures as appropriate and evaluate response  - Consider cultural and social influences on pain and pain management  - Notify physician/advanced practitioner if interventions unsuccessful or patient reports new pain  Outcome: Progressing     Problem: INFECTION - ADULT  Goal: Absence or prevention of progression during hospitalization  Description: INTERVENTIONS:  - Assess and monitor for signs and symptoms of infection  - Monitor lab/diagnostic results  - Monitor all insertion sites, i e  indwelling lines, tubes, and drains  - Monitor endotracheal if appropriate and nasal secretions for changes in amount and color  - [place] appropriate cooling/warming therapies per order  - Administer medications as ordered  - Instruct and encourage patient and family to use good hand hygiene technique  - Identify and instruct in appropriate isolation precautions for identified infection/condition  Outcome: Progressing  Goal: Absence of fever/infection during neutropenic period  Description: INTERVENTIONS:  - Monitor WBC    Outcome: Progressing     Problem: SAFETY ADULT  Goal: Maintain or return to baseline ADL function  Description: INTERVENTIONS:  -  Assess patient's ability to carry out ADLs; assess patient's baseline for ADL function and identify physical deficits which impact ability to perform ADLs (bathing, care of mouth/teeth, toileting, grooming, dressing, etc )  - Assess/evaluate cause of self-care deficits   - Assess range of motion  - Assess patient's mobility; develop plan if impaired  - Assess patient's need for assistive devices and provide as appropriate  - Encourage maximum independence but intervene and supervise when necessary  - Involve family in performance of ADLs  - Assess for home care needs following discharge   - Consider OT consult to assist with ADL evaluation and planning for discharge  - Provide patient education as appropriate  Outcome: Progressing  Goal: Maintains/Returns to pre admission functional level  Description: INTERVENTIONS:  - Perform BMAT or MOVE assessment daily    - Set and communicate daily mobility goal to care team and patient/family/caregiver  - Collaborate with rehabilitation services on mobility goals if consulted  - Perform Range of Motion  times a day  - Reposition patient every  hours    - Dangle patient  times a day  - Stand patient  times a day  - Ambulate patient  times a day  - Out of bed to chair  times a day   - Out of bed for meals  times a day  - Out of bed for toileting  - Record patient progress and toleration of activity level   Outcome: Progressing  Goal: Patient will remain free of falls  Description: INTERVENTIONS:  - Educate patient/family on patient safety including physical limitations  - Instruct patient to call for assistance with activity   - Consult OT/PT to assist with strengthening/mobility   - Keep Call bell within reach  - Keep bed low and locked with side rails adjusted as appropriate  - Keep care items and personal belongings within reach  - Initiate and maintain comfort rounds  - Make Fall Risk Sign visible to staff  - Offer Toileting every  Hours, in advance of need  - Initiate/Maintain alarm  - Obtain necessary fall risk management equipment  - Apply yellow socks and bracelet for high fall risk patients  - Consider moving patient to room near nurses station  Outcome: Progressing     Problem: DISCHARGE PLANNING  Goal: Discharge to home or other facility with appropriate resources  Description: INTERVENTIONS:  - Identify barriers to discharge w/patient and caregiver  - Arrange for needed discharge resources and transportation as appropriate  - Identify discharge learning needs (meds, wound care, etc )  - Arrange for interpretive services to assist at discharge as needed  - Refer to Case Management Department for coordinating discharge planning if the patient needs post-hospital services based on physician/advanced practitioner order or complex needs related to functional status, cognitive ability, or social support system  Outcome: Progressing Problem: Knowledge Deficit  Goal: Patient/family/caregiver demonstrates understanding of disease process, treatment plan, medications, and discharge instructions  Description: Complete learning assessment and assess knowledge base    Interventions:  - Provide teaching at level of understanding  - Provide teaching via preferred learning methods  Outcome: Progressing

## 2022-07-29 NOTE — CONSULTS
[address] 1955, 75 y o  male MRN: [id number]  Unit/Bed#: OR [place] Encounter: [id number]  Primary Care Provider: Lencho Hobson DO   Date and time admitted to hospital: 7/29/2022  5:58 AM    Inpatient consult to Internal Medicine  Consult performed by: Gita Ferraro DO  Consult ordered by: Gonzella Seip, PA-C          Dyspnea  Assessment & Plan  Patient with progressive dyspnea on exertion for the past 3 weeks  No orthopnea or paroxysmal nocturnal dyspnea  No palpitation or chest pain  Suspect secondary to worsening anemia  However rule out cardiac etiology given mild right lower extremity edema  Check chest x-ray  Cardiac echo and BNP  Continue supportive care    Hypoxia  Assessment & Plan  Suspect secondary to anemia  Check chest x-ray and cardiac echo  Continue oxygen supplement  Blood transfusion    Autoimmune hemolytic anemia  Assessment & Plan  On chronic prednisone treatment  Hemoglobin 6 4 with persistent leukocytosis  No active bleeding  Hematology consulted for further management  One unit of blood transfusion was ordered today    Hx of pulmonary embolus  Assessment & Plan  History of DVT/PE  Portal vein thrombosis  Maintained on Pradaxa    Malignant melanoma of leg, right (HCC)  Assessment & Plan  · Status post Excision and sentinel lymph node biopsy today  · Surgical Oncology is following      Essential hypertension  Assessment & Plan  Acceptable BP  Continue Toprol XL and HCTZ    Gastroesophageal reflux disease  Assessment & Plan  Continue PPI      VTE Prophylaxis:   High Risk (Score >/= 5) - Pharmacological DVT Prophylaxis Ordered: dabigatran (Pradaxa)  Sequential Compression Devices Ordered  Recommendations for Discharge:  · no    Counseling / Coordination of Care Time: 60 minutes Greater than 50% of total time spent on patient counseling and coordination of care  Collaboration of Care:  Were Recommendations Directly Discussed with Primary Treatment Team? Yes    History of Present Illness:    Audrey Briones is a 75 y o  male who is originally admitted to the surgical Oncology service due to elective right thigh melanoma excision   SLIM  consulted for medical management  Patient with underlying history of autoimmune hemolytic anemia on chronic prednisone, DVT/PE/portal vein thrombosis on Pradaxa, hypertension, GERD and right thigh malignant melanoma present electively today for excision biopsy of the melanoma  Patient with hemoglobin of 6 4 today  He report progressive dyspnea on exertion over the past 3 weeks associated with right ankle edema  No chest pain or orthopnea  No fever chills, no nausea vomiting or diarrhea    Status post right thigh melanoma excision today    Review of Systems:  Review of Systems   Constitutional: Positive for fatigue  Negative for chills and fever  HENT: Negative for sore throat  Respiratory: Positive for shortness of breath  Negative for cough and chest tightness  Cardiovascular: Positive for leg swelling  Negative for chest pain and palpitations  Gastrointestinal: Negative for abdominal pain, blood in stool, diarrhea, nausea and vomiting  Endocrine: Negative for polyuria  Genitourinary: Negative for difficulty urinating and dysuria  Neurological: Negative for dizziness, speech difficulty and headaches  All other systems reviewed and are negative        Past Medical and Surgical History:   Past Medical History:   Diagnosis Date    Anemia 11/2/2016    Anxiety     Arthritis     Autoimmune hemolytic anemia ([address] )     Claustrophobia     COVID 12/2020    DVT (deep venous thrombosis) (HCC)     GERD (gastroesophageal reflux disease)     Hearing loss, right     Hemolytic anemia (HCC)     History of transfusion     2018 - no adverse reaction    Hypertension     Malignant melanoma of leg, right ([address] ) 7/1/2022    Palpitation     Portal vein thrombosis     PTSD (post-traumatic stress disorder)     Pulmonary emboli (HCC)     Tobacco abuse        Past Surgical History:   Procedure Laterality Date    CATARACT EXTRACTION Bilateral     CHOLECYSTECTOMY  7/18/2017    COLONOSCOPY      ELBOW ARTHROPLASTY Left     bursectomy    JOINT REPLACEMENT Right 2/2/2021    knee    KNEE SURGERY Right     meniscus tear    ME LAP,CHOLECYSTECTOMY/GRAPH N/A 12/23/2017    Procedure: CHOLECYSTECTOMY LAPAROSCOPIC with cholangiogram;  Surgeon: Rebecca Aponte MD;  Location[facility];  Service: General    ME REMOVAL SPLEEN, TOTAL N/A 5/18/2017    Procedure: LAPAROSCOPIC HAND ASSIST SPLENECTOMY;  Surgeon: Merline Hensley MD;  [facility];  Service: Surgical Oncology    ME TOTAL KNEE ARTHROPLASTY Right 2/2/2021    Procedure: ARTHROPLASTY KNEE TOTAL;  Surgeon: Florentino Cardozo MD;  [facility];  Service: Orthopedics    ME TOTAL KNEE ARTHROPLASTY Left 11/17/2021    Procedure: TOTAL KNEE REPLACEMENT;  Surgeon: Florentino Cardozo MD;  Location[facility];  Service: Orthopedics    SHOULDER SURGERY Left     rotator cuff x4, reconstruction       Meds/Allergies:  all medications and allergies reviewed    Allergies: No Known Allergies    Social History:  Marital Status: /Civil Union  Substance Use History:   Social History     Substance and Sexual Activity   Alcohol Use Yes    Alcohol/week: 6 0 standard drinks    Types: 6 Cans of beer per week    Comment: socially     Social History     Tobacco Use   Smoking Status Current Some Day Smoker    Packs/day: 0 00    Years: 5 00    Pack years: 0 00    Types: Cigars   Smokeless Tobacco Current User    Types: Snuff   Tobacco Comment    5  a week average     Social History     Substance and Sexual Activity   Drug Use Yes    Frequency: 3 0 times per week    Types: Marijuana    Comment: medical marijuana       Family History:    Family History   Problem Relation Age of Onset    Cancer Mother     Breast cancer Mother     Other Father         CABG    Heart attack Paternal Grandfather         acute MI     Physical Exam:   Vitals:   Blood Pressure: 134/71 (07/29/22 1500)  Pulse: 82 (07/29/22 1500)  Temperature: 98 2 °F (36 8 °C) (07/29/22 1430)  Temp Source: Temporal (07/29/22 5436)  Respirations: 13 (07/29/22 1500)  Height: 5' 7" (170 2 cm) (07/25/22 1117)  Weight - Scale: 87 5 kg (193 lb) (07/25/22 1117)  SpO2: 96 % (07/29/22 1500)    Physical Exam  Vitals reviewed  Constitutional:       Appearance: Normal appearance  He is not ill-appearing  Comments: Dyspnea during conversation   HENT:      Head: Normocephalic and atraumatic  Mouth/Throat:      Mouth: Mucous membranes are moist       Pharynx: No oropharyngeal exudate  Eyes:      General: Scleral icterus present  Extraocular Movements: Extraocular movements intact  Cardiovascular:      Rate and Rhythm: Normal rate and regular rhythm  Pulses: Normal pulses  Heart sounds: Normal heart sounds  No murmur heard  Pulmonary:      Effort: Pulmonary effort is normal  No respiratory distress  Breath sounds: Normal breath sounds  No wheezing  Abdominal:      General: Bowel sounds are normal  There is distension (Chronically distended)  Palpations: Abdomen is soft  Tenderness: There is no abdominal tenderness  Musculoskeletal:         General: Normal range of motion  Cervical back: Normal range of motion and neck supple  Comments: Trace bilateral lower extremities edema   Skin:     General: Skin is warm and dry  Findings: No rash  Neurological:      General: No focal deficit present  Mental Status: He is alert and oriented to person, place, and time  Cranial Nerves: No cranial nerve deficit     Psychiatric:         Mood and Affect: Mood normal             Additional Data:   Lab Results:    Results from last 7 days   Lab Units 07/29/22  0703 07/27/22  0943   WBC Thousand/uL 21 47* 22 62*   HEMOGLOBIN g/dL 6 4* 7 7* HEMATOCRIT % 18 8* 24 0*   PLATELETS Thousands/uL 381 498*   BANDS PCT %  --  3   LYMPHO PCT %  --  11*   MONO PCT %  --  8   EOS PCT %  --  2                 Lab Results   Component Value Date/Time    HGBA1C 4 4 01/08/2021 09:34 AM    HGBA1C 4 5 02/19/2020 09:05 AM               Imaging:  Reviewed  NM lymphatic melanoma   Final Result by Maritza Corcoran MD (07/29 6008)      1  [place] lymph node localized to right inguinal region  Workstation performed: [id number]         XR chest portable    (Results Pending)       EKG, Pathology, and Other Studies Reviewed on Admission:   · EKG:  Normal recent EKG on 7/21/2022    ** Please Note: This note may have been constructed using a voice recognition system   **

## 2022-07-29 NOTE — ANESTHESIA PREPROCEDURE EVALUATION
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   (+) Symptomatic anemia      MUSCULOSKELETAL   (+) Chronic bilateral low back pain with bilateral sciatica   (+) Primary localized osteoarthrosis of the knee, right   (+) Primary osteoarthritis of left knee      NEURO/PSYCH   (+) Chronic bilateral low back pain with bilateral sciatica   (+) Hx of pulmonary embolus   (+) Posttraumatic stress disorder      PULMONARY   (+) Shortness of breath      Other   (+) Splenomegaly             Anesthesia Plan  ASA Score- 3     Anesthesia Type- IV sedation with anesthesia with ASA Monitors  Additional Monitors:   Airway Plan: LMA and NTT  Plan Factors-Exercise tolerance (METS): <4 METS  Exercise comment: Dyspnea associated with Exertion and anemia, nop CP    I reviewed his anemia and options for surgical excision  After discussion with Dr Paula Levin and Dr Lauryn Alvarado, we will proceed with case and transfusion prior to discharge home,  Will likely need to be admitted       Chart reviewed  EKG reviewed  Imaging results reviewed  Existing labs reviewed  Patient summary reviewed  Patient is not a current smoker  Patient did not smoke on day of surgery  Obstructive sleep apnea risk education given perioperatively  Induction- intravenous  Postoperative Plan- Plan for postoperative opioid use  Informed Consent- Anesthetic plan and risks discussed with patient and spouse  I personally reviewed this patient with the CRNA  Discussed and agreed on the Anesthesia Plan with the CRNA  Ling Valentine

## 2022-07-29 NOTE — ASSESSMENT & PLAN NOTE
On chronic prednisone treatment  Hemoglobin 6 4 with persistent leukocytosis  No active bleeding  Hematology consulted for further management  One unit of blood transfusion was ordered today

## 2022-07-29 NOTE — INTERVAL H&P NOTE
H&P reviewed  After examining the patient I find no changes in the patients condition since the H&P had been written      Vitals:    07/29/22 0634   BP: 151/82   Pulse: 95   Resp: 16   Temp: 98 5 °F (36 9 °C)   SpO2: 95%

## 2022-07-29 NOTE — ASSESSMENT & PLAN NOTE
Suspect secondary to anemia  Check chest x-ray and cardiac echo  Continue oxygen supplement  Blood transfusion

## 2022-07-29 NOTE — QUICK NOTE
Patient requires >2midnight inpatient stay secondary to need for further workup of leukocytosis and anemia pre-operatively  Consults placed to internal medicine and medical oncology/hematology  Appreciate recommendations  Conchita Carbajal

## 2022-07-29 NOTE — ASSESSMENT & PLAN NOTE
Patient with progressive dyspnea on exertion for the past 3 weeks  No orthopnea or paroxysmal nocturnal dyspnea  No palpitation or chest pain  Suspect secondary to worsening anemia  However rule out cardiac etiology given mild right lower extremity edema  Check chest x-ray  Cardiac echo and BNP  Continue supportive care

## 2022-07-29 NOTE — QUICK NOTE
Postoperative evaluation    26-year-old male with leg neck melanoma the right leg, now status post wide local excision and closure  Patient remains hospitalized due anemia with hemoglobin of 6 4  Operation was uneventful  Patient currently on 8 L mid flow saturations in the high 90s on evaluation  He does endorse some pain that is controlled with pain medication in the right lower extremity  He endorses shortness of breath as well  Otherwise patient is tolerating a diet without nausea or vomiting  He denies chest pain  Afebrile, VSS on 8 L mid flow  White blood cell of 21 4  Hemoglobin of 6 4    On exam well-developed well-nourished male sitting up in bed in no acute distress on nasal cannula  Right lower extremity incision with dressing in place is clean dry and intact  SCDs in place    Plan  Diet as tolerated  Continue IV fluids until taking good p o   P r n  Pain control  Appreciate Medical Oncology and Medicine recommendations  DVT prophylaxis  Blood transfusion will be delayed due to patient antibodies    Transfusion order is in place      Chantale Rodas MD  7:58 PM  07/29/22

## 2022-07-30 ENCOUNTER — APPOINTMENT (INPATIENT)
Dept: RADIOLOGY | Facility: HOSPITAL | Age: 67
DRG: 570 | End: 2022-07-30
Payer: MEDICARE

## 2022-07-30 PROBLEM — D72.829 LEUKOCYTOSIS: Status: ACTIVE | Noted: 2022-07-30

## 2022-07-30 PROBLEM — R17 ELEVATED BILIRUBIN: Status: ACTIVE | Noted: 2022-07-30

## 2022-07-30 PROBLEM — R79.89 ELEVATED SERUM CREATININE: Status: ACTIVE | Noted: 2022-07-30

## 2022-07-30 LAB
ALBUMIN SERPL BCP-MCNC: 3.7 G/DL (ref 3.5–5)
ALP SERPL-CCNC: 79 U/L (ref 46–116)
ALT SERPL W P-5'-P-CCNC: 30 U/L (ref 12–78)
ANION GAP SERPL CALCULATED.3IONS-SCNC: 7 MMOL/L (ref 4–13)
ANISOCYTOSIS BLD QL SMEAR: PRESENT
AST SERPL W P-5'-P-CCNC: 46 U/L (ref 5–45)
BASO STIPL BLD QL SMEAR: PRESENT
BASOPHILS # BLD MANUAL: 0 THOUSAND/UL (ref 0–0.1)
BASOPHILS NFR MAR MANUAL: 0 % (ref 0–1)
BILIRUB DIRECT SERPL-MCNC: 0.77 MG/DL (ref 0–0.2)
BILIRUB SERPL-MCNC: 7.05 MG/DL (ref 0.2–1)
BUN SERPL-MCNC: 33 MG/DL (ref 5–25)
CALCIUM SERPL-MCNC: 9.2 MG/DL (ref 8.3–10.1)
CHLORIDE SERPL-SCNC: 105 MMOL/L (ref 96–108)
CO2 SERPL-SCNC: 27 MMOL/L (ref 21–32)
CREAT SERPL-MCNC: 1.55 MG/DL (ref 0.6–1.3)
EOSINOPHIL # BLD MANUAL: 0 THOUSAND/UL (ref 0–0.4)
EOSINOPHIL NFR BLD MANUAL: 0 % (ref 0–6)
GFR SERPL CREATININE-BSD FRML MDRD: 45 ML/MIN/1.73SQ M
GIANT PLATELETS BLD QL SMEAR: PRESENT
GLUCOSE SERPL-MCNC: 105 MG/DL (ref 65–140)
HBV CORE AB SER QL: NORMAL
HBV SURFACE AB SER-ACNC: 3.43 MIU/ML
HBV SURFACE AG SER QL: NORMAL
HCT VFR BLD AUTO: 21.5 % (ref 36.5–49.3)
HCV AB SER QL: NORMAL
HGB BLD-MCNC: 7.2 G/DL (ref 12–17)
HOWELL-JOLLY BOD BLD QL SMEAR: PRESENT
LDH SERPL-CCNC: 1077 U/L (ref 81–234)
LYMPHOCYTES # BLD AUTO: 13 % (ref 14–44)
LYMPHOCYTES # BLD AUTO: 2.5 THOUSAND/UL (ref 0.6–4.47)
MACROCYTES BLD QL AUTO: PRESENT
MAGNESIUM SERPL-MCNC: 2.4 MG/DL (ref 1.6–2.6)
MCH RBC QN AUTO: 46.4 PG (ref 26.8–34.3)
MCHC RBC AUTO-ENTMCNC: 30.2 G/DL (ref 31.4–37.4)
MCV RBC AUTO: 154 FL (ref 82–98)
MICROCYTES BLD QL AUTO: PRESENT
MONOCYTES # BLD AUTO: 0.96 THOUSAND/UL (ref 0–1.22)
MONOCYTES NFR BLD: 5 % (ref 4–12)
NEUTROPHILS # BLD MANUAL: 14.78 THOUSAND/UL (ref 1.85–7.62)
NEUTS SEG NFR BLD AUTO: 77 % (ref 43–75)
NRBC BLD AUTO-RTO: 77 /100 WBC (ref 0–2)
NT-PROBNP SERPL-MCNC: 815 PG/ML
PAPPENHEIMER BOD BLD QL SMEAR: PRESENT
PHOSPHATE SERPL-MCNC: 3.5 MG/DL (ref 2.3–4.1)
PLATELET # BLD AUTO: 290 THOUSANDS/UL (ref 149–390)
PLATELET BLD QL SMEAR: ADEQUATE
PMV BLD AUTO: 9.9 FL (ref 8.9–12.7)
POLYCHROMASIA BLD QL SMEAR: PRESENT
POTASSIUM SERPL-SCNC: 3.6 MMOL/L (ref 3.5–5.3)
PROT SERPL-MCNC: 6.6 G/DL (ref 6.4–8.4)
RBC # BLD AUTO: 1.4 MILLION/UL (ref 3.88–5.62)
RBC MORPH BLD: PRESENT
RETICS # AUTO: ABNORMAL 10*3/UL (ref 14356–105094)
RETICS # CALC: >30 % (ref 0.37–1.87)
SARS-COV-2 RNA RESP QL NAA+PROBE: NEGATIVE
SODIUM SERPL-SCNC: 139 MMOL/L (ref 135–147)
VARIANT LYMPHS # BLD AUTO: 5 %
WBC # BLD AUTO: 19.2 THOUSAND/UL (ref 4.31–10.16)

## 2022-07-30 PROCEDURE — 87340 HEPATITIS B SURFACE AG IA: CPT | Performed by: INTERNAL MEDICINE

## 2022-07-30 PROCEDURE — U0005 INFEC AGEN DETEC AMPLI PROBE: HCPCS | Performed by: NURSE PRACTITIONER

## 2022-07-30 PROCEDURE — 82248 BILIRUBIN DIRECT: CPT | Performed by: INTERNAL MEDICINE

## 2022-07-30 PROCEDURE — 83880 ASSAY OF NATRIURETIC PEPTIDE: CPT | Performed by: INTERNAL MEDICINE

## 2022-07-30 PROCEDURE — 85045 AUTOMATED RETICULOCYTE COUNT: CPT | Performed by: INTERNAL MEDICINE

## 2022-07-30 PROCEDURE — 99024 POSTOP FOLLOW-UP VISIT: CPT | Performed by: SURGERY

## 2022-07-30 PROCEDURE — 83735 ASSAY OF MAGNESIUM: CPT | Performed by: INTERNAL MEDICINE

## 2022-07-30 PROCEDURE — 71045 X-RAY EXAM CHEST 1 VIEW: CPT

## 2022-07-30 PROCEDURE — 84100 ASSAY OF PHOSPHORUS: CPT | Performed by: INTERNAL MEDICINE

## 2022-07-30 PROCEDURE — 85027 COMPLETE CBC AUTOMATED: CPT | Performed by: INTERNAL MEDICINE

## 2022-07-30 PROCEDURE — 99232 SBSQ HOSP IP/OBS MODERATE 35: CPT | Performed by: INTERNAL MEDICINE

## 2022-07-30 PROCEDURE — P9058 RBC, L/R, CMV-NEG, IRRAD: HCPCS

## 2022-07-30 PROCEDURE — 86706 HEP B SURFACE ANTIBODY: CPT | Performed by: INTERNAL MEDICINE

## 2022-07-30 PROCEDURE — 86704 HEP B CORE ANTIBODY TOTAL: CPT | Performed by: INTERNAL MEDICINE

## 2022-07-30 PROCEDURE — 86803 HEPATITIS C AB TEST: CPT | Performed by: INTERNAL MEDICINE

## 2022-07-30 PROCEDURE — 83615 LACTATE (LD) (LDH) ENZYME: CPT | Performed by: INTERNAL MEDICINE

## 2022-07-30 PROCEDURE — 85007 BL SMEAR W/DIFF WBC COUNT: CPT | Performed by: INTERNAL MEDICINE

## 2022-07-30 PROCEDURE — 80053 COMPREHEN METABOLIC PANEL: CPT | Performed by: INTERNAL MEDICINE

## 2022-07-30 PROCEDURE — 83010 ASSAY OF HAPTOGLOBIN QUANT: CPT | Performed by: INTERNAL MEDICINE

## 2022-07-30 PROCEDURE — U0003 INFECTIOUS AGENT DETECTION BY NUCLEIC ACID (DNA OR RNA); SEVERE ACUTE RESPIRATORY SYNDROME CORONAVIRUS 2 (SARS-COV-2) (CORONAVIRUS DISEASE [COVID-19]), AMPLIFIED PROBE TECHNIQUE, MAKING USE OF HIGH THROUGHPUT TECHNOLOGIES AS DESCRIBED BY CMS-2020-01-R: HCPCS | Performed by: NURSE PRACTITIONER

## 2022-07-30 RX ORDER — POTASSIUM CHLORIDE 20 MEQ/1
40 TABLET, EXTENDED RELEASE ORAL ONCE
Status: COMPLETED | OUTPATIENT
Start: 2022-07-30 | End: 2022-07-30

## 2022-07-30 RX ORDER — PREDNISONE 20 MG/1
40 TABLET ORAL DAILY
Status: DISCONTINUED | OUTPATIENT
Start: 2022-07-31 | End: 2022-08-05 | Stop reason: HOSPADM

## 2022-07-30 RX ORDER — DABIGATRAN ETEXILATE 75 MG/1
75 CAPSULE ORAL EVERY 12 HOURS SCHEDULED
Status: DISCONTINUED | OUTPATIENT
Start: 2022-07-30 | End: 2022-07-31

## 2022-07-30 RX ORDER — FUROSEMIDE 10 MG/ML
40 INJECTION INTRAMUSCULAR; INTRAVENOUS ONCE
Status: COMPLETED | OUTPATIENT
Start: 2022-07-30 | End: 2022-07-30

## 2022-07-30 RX ADMIN — SODIUM CHLORIDE, SODIUM LACTATE, POTASSIUM CHLORIDE, AND CALCIUM CHLORIDE 125 ML/HR: .6; .31; .03; .02 INJECTION, SOLUTION INTRAVENOUS at 01:59

## 2022-07-30 RX ADMIN — PANTOPRAZOLE SODIUM 40 MG: 40 TABLET, DELAYED RELEASE ORAL at 05:27

## 2022-07-30 RX ADMIN — PREDNISONE 60 MG: 20 TABLET ORAL at 08:17

## 2022-07-30 RX ADMIN — HYDROCHLOROTHIAZIDE 25 MG: 25 TABLET ORAL at 08:17

## 2022-07-30 RX ADMIN — HEPARIN SODIUM 5000 UNITS: 5000 INJECTION INTRAVENOUS; SUBCUTANEOUS at 05:27

## 2022-07-30 RX ADMIN — DABIGATRAN ETEXILATE MESYLATE 75 MG: 75 CAPSULE ORAL at 20:16

## 2022-07-30 RX ADMIN — METOPROLOL SUCCINATE 12.5 MG: 25 TABLET, EXTENDED RELEASE ORAL at 08:17

## 2022-07-30 RX ADMIN — DOCUSATE SODIUM 100 MG: 100 CAPSULE, LIQUID FILLED ORAL at 17:33

## 2022-07-30 RX ADMIN — SODIUM CHLORIDE, SODIUM LACTATE, POTASSIUM CHLORIDE, AND CALCIUM CHLORIDE 125 ML/HR: .6; .31; .03; .02 INJECTION, SOLUTION INTRAVENOUS at 10:40

## 2022-07-30 RX ADMIN — FUROSEMIDE 40 MG: 10 INJECTION, SOLUTION INTRAMUSCULAR; INTRAVENOUS at 20:16

## 2022-07-30 RX ADMIN — TRAMADOL HYDROCHLORIDE 50 MG: 50 TABLET, COATED ORAL at 12:25

## 2022-07-30 RX ADMIN — DOCUSATE SODIUM 100 MG: 100 CAPSULE, LIQUID FILLED ORAL at 08:17

## 2022-07-30 RX ADMIN — POTASSIUM CHLORIDE 20 MEQ: 1500 TABLET, EXTENDED RELEASE ORAL at 08:17

## 2022-07-30 RX ADMIN — POTASSIUM CHLORIDE 40 MEQ: 1500 TABLET, EXTENDED RELEASE ORAL at 13:18

## 2022-07-30 RX ADMIN — ACETAMINOPHEN 975 MG: 325 TABLET ORAL at 13:18

## 2022-07-30 RX ADMIN — TRAMADOL HYDROCHLORIDE 50 MG: 50 TABLET, COATED ORAL at 17:33

## 2022-07-30 RX ADMIN — ACETAMINOPHEN 975 MG: 325 TABLET ORAL at 05:27

## 2022-07-30 NOTE — ASSESSMENT & PLAN NOTE
· Creatinine 1 55 today, baseline appears to be around 1 2-1 3  · Monitor  · Avoid nephrotoxins and hypotension

## 2022-07-30 NOTE — ASSESSMENT & PLAN NOTE
· Follows with Hematology, Dr Alyssa Guerrero, as outpatient  · Heme/Onc following here, I discussed with them today and appreciate their ongoing recommendations   · Heme/Onc recommending to decrease prednisone dose from 60mg to 40mg  · Hemoglobin 6 4 on admission   7 2 today  · With warm and cold antibody  · LDH elevated at 1,077  · Sherry test positive  · Retic count pending  · He has a blood transfusion ordered per Surg/Onc for least incompatible   · Heme/Onc checking hepatitis today and if negative, plan to start weekly rituximab tomorrow

## 2022-07-30 NOTE — NURSING NOTE
RN received report from day shift RN that pt's hgb 6 4 today and pt needs 1 unit of RBC  Pt needs specific blood due to special blood product because of pt antibodies  This RN called blood bank who said there is a unit ready from [facility], but the unit is still considered to be incompatible- per Bradford Martinez at blood bank is new unit is requested it is most likely to read as incompatible again  If the team is OK to given this, they will need to fill out a deviation form prior to administration  White sx A  Green and heme onc Jania aware of situation  Both OK to wait and discuss further options with day team tomorrow  This RN will make oncoming RN tomorrow aware

## 2022-07-30 NOTE — PLAN OF CARE
Problem: MOBILITY - ADULT  Goal: Maintain or return to baseline ADL function  Description: INTERVENTIONS:  -  Assess patient's ability to carry out ADLs; assess patient's baseline for ADL function and identify physical deficits which impact ability to perform ADLs (bathing, care of mouth/teeth, toileting, grooming, dressing, etc )  - Assess/evaluate cause of self-care deficits   - Assess range of motion  - Assess patient's mobility; develop plan if impaired  - Assess patient's need for assistive devices and provide as appropriate  - Encourage maximum independence but intervene and supervise when necessary  - Involve family in performance of ADLs  - Assess for home care needs following discharge   - Consider OT consult to assist with ADL evaluation and planning for discharge  - Provide patient education as appropriate  Outcome: Progressing  Goal: Maintains/Returns to pre admission functional level  Description: INTERVENTIONS:  - Perform BMAT or MOVE assessment daily    - Set and communicate daily mobility goal to care team and patient/family/caregiver  - Collaborate with rehabilitation services on mobility goals if consulted  - Perform Range of Motion 2 times a day  - Reposition patient every 2 hours    - Dangle patient 2 times a day  - Stand patient 2 times a day  - Ambulate patient 2 times a day  - Out of bed to chair 2 times a day   - Out of bed for meals 2 times a day  - Out of bed for toileting  - Record patient progress and toleration of activity level   Outcome: Progressing     Problem: PAIN - ADULT  Goal: Verbalizes/displays adequate comfort level or baseline comfort level  Description: Interventions:  - Encourage patient to monitor pain and request assistance  - Assess pain using appropriate pain scale  - Administer analgesics based on type and severity of pain and evaluate response  - Implement non-pharmacological measures as appropriate and evaluate response  - Consider cultural and social influences on pain and pain management  - Notify physician/advanced practitioner if interventions unsuccessful or patient reports new pain  Outcome: Progressing     Problem: INFECTION - ADULT  Goal: Absence or prevention of progression during hospitalization  Description: INTERVENTIONS:  - Assess and monitor for signs and symptoms of infection  - Monitor lab/diagnostic results  - Monitor all insertion sites, i e  indwelling lines, tubes, and drains  - Monitor endotracheal if appropriate and nasal secretions for changes in amount and color  - [place] appropriate cooling/warming therapies per order  - Administer medications as ordered  - Instruct and encourage patient and family to use good hand hygiene technique  - Identify and instruct in appropriate isolation precautions for identified infection/condition  Outcome: Progressing  Goal: Absence of fever/infection during neutropenic period  Description: INTERVENTIONS:  - Monitor WBC    Outcome: Progressing     Problem: SAFETY ADULT  Goal: Maintain or return to baseline ADL function  Description: INTERVENTIONS:  -  Assess patient's ability to carry out ADLs; assess patient's baseline for ADL function and identify physical deficits which impact ability to perform ADLs (bathing, care of mouth/teeth, toileting, grooming, dressing, etc )  - Assess/evaluate cause of self-care deficits   - Assess range of motion  - Assess patient's mobility; develop plan if impaired  - Assess patient's need for assistive devices and provide as appropriate  - Encourage maximum independence but intervene and supervise when necessary  - Involve family in performance of ADLs  - Assess for home care needs following discharge   - Consider OT consult to assist with ADL evaluation and planning for discharge  - Provide patient education as appropriate  Outcome: Progressing  Goal: Maintains/Returns to pre admission functional level  Description: INTERVENTIONS:  - Perform BMAT or MOVE assessment daily    - Set and communicate daily mobility goal to care team and patient/family/caregiver  - Collaborate with rehabilitation services on mobility goals if consulted  - Perform Range of Motion 2 times a day  - Reposition patient every 2 hours    - Dangle patient 2 times a day  - Stand patient 2 times a day  - Ambulate patient 2 times a day  - Out of bed to chair 2 times a day   - Out of bed for meals 2 times a day  - Out of bed for toileting  - Record patient progress and toleration of activity level   Outcome: Progressing  Goal: Patient will remain free of falls  Description: INTERVENTIONS:  - Educate patient/family on patient safety including physical limitations  - Instruct patient to call for assistance with activity   - Consult OT/PT to assist with strengthening/mobility   - Keep Call bell within reach  - Keep bed low and locked with side rails adjusted as appropriate  - Keep care items and personal belongings within reach  - Initiate and maintain comfort rounds  - Make Fall Risk Sign visible to staff  - Offer Toileting every 2 Hours, in advance of need  - Initiate/Maintain  - Obtain necessary fall risk management equipment:  - Apply yellow socks and bracelet for high fall risk patients  - Consider moving patient to room near nurses station  Outcome: Progressing     Problem: DISCHARGE PLANNING  Goal: Discharge to home or other facility with appropriate resources  Description: INTERVENTIONS:  - Identify barriers to discharge w/patient and caregiver  - Arrange for needed discharge resources and transportation as appropriate  - Identify discharge learning needs (meds, wound care, etc )  - Arrange for interpretive services to assist at discharge as needed  - Refer to Case Management Department for coordinating discharge planning if the patient needs post-hospital services based on physician/advanced practitioner order or complex needs related to functional status, cognitive ability, or social support system  Outcome: Progressing     Problem: Knowledge Deficit  Goal: Patient/family/caregiver demonstrates understanding of disease process, treatment plan, medications, and discharge instructions  Description: Complete learning assessment and assess knowledge base    Interventions:  - Provide teaching at level of understanding  - Provide teaching via preferred learning methods  Outcome: Progressing

## 2022-07-30 NOTE — ASSESSMENT & PLAN NOTE
· Patient with progressive dyspnea on exertion for the past 3 weeks  · No orthopnea or paroxysmal nocturnal dyspnea  · No palpitation or chest pain  · Suspect secondary to worsening anemia  · CXR with no acute cardiopulmonary disease  · Echo pending  NTproBNP 815  · Patient on 8L mid-flow   Wean oxygen as tolerated

## 2022-07-30 NOTE — ASSESSMENT & PLAN NOTE
· Suspect secondary to anemia  · CXR with no acute cardiopulmonary disease   · Echo pending  · On 8L mid-flow   Wean oxygen as able

## 2022-07-30 NOTE — ASSESSMENT & PLAN NOTE
· History of DVT/PE, Portal vein thrombosis  · Maintained on Pradaxa as outpatient - both Surg/Onc and Heme/Onc cleared to resume Pradaxa today

## 2022-07-30 NOTE — ASSESSMENT & PLAN NOTE
· Patient has chronic leukocytosis  · With chronic steroid use, recent surgery, history of splenectomy, and PE

## 2022-07-30 NOTE — PROGRESS NOTES
[address] [facility]  Progress Note - Veronica Navarro 1955, 75 y o  male MRN: [id number]  Unit/Bed#: [facility] 910-01 Encounter: [id number]  Primary Care Provider: Abdirahman Huff DO   Date and time admitted to hospital: 7/29/2022  5:58 AM    * Dyspnea  Assessment & Plan  · Patient with progressive dyspnea on exertion for the past 3 weeks  · No orthopnea or paroxysmal nocturnal dyspnea  · No palpitation or chest pain  · Suspect secondary to worsening anemia  · CXR with no acute cardiopulmonary disease  · Echo pending  NTproBNP 815  · Patient on 8L mid-flow  Wean oxygen as tolerated    Hypoxia  Assessment & Plan  · Suspect secondary to anemia  · CXR with no acute cardiopulmonary disease   · Echo pending  · On 8L mid-flow  Wean oxygen as able     Malignant melanoma of leg, right [facility])  Assessment & Plan  · Status post Excision and sentinel lymph node biopsy 7/29/22  · Surgical Oncology is following      Autoimmune hemolytic anemia  Assessment & Plan  · Follows with Hematology, Dr Anjel Patterson, as outpatient  · Heme/Onc following here, I discussed with them today and appreciate their ongoing recommendations   · Heme/Onc recommending to decrease prednisone dose from 60mg to 40mg  · Hemoglobin 6 4 on admission   7 2 today  · With warm and cold antibody  · LDH elevated at 1,077  · Sherry test positive  · Retic count pending  · He has a blood transfusion ordered per Surg/Onc for least incompatible   · Heme/Onc checking hepatitis today and if negative, plan to start weekly rituximab tomorrow     Hx of pulmonary embolus  Assessment & Plan  · History of DVT/PE, Portal vein thrombosis  · Maintained on Pradaxa as outpatient - both Surg/Onc and Heme/Onc cleared to resume Pradaxa today    Elevated bilirubin  Assessment & Plan  · Total bilirubin 7 05  · Direct bilirubin pending  · With hemolytic anemia as above    Elevated serum creatinine  Assessment & Plan  · Creatinine 1 55 today, baseline appears to be around 1 2-1 3  · Monitor  · Avoid nephrotoxins and hypotension     Essential hypertension  Assessment & Plan  · Acceptable BP  · Continue Toprol XL and HCTZ    Gastroesophageal reflux disease  Assessment & Plan  · Continue PPI        VTE Pharmacologic Prophylaxis:   pradaxa ordered - cleared to resume per Surg/Onc and Heme/Onc  Patient already received SC heparin this AM    Patient Centered Rounds: I performed bedside rounds with nursing staff today  d/w YASMIN Merrill  Discussions with Specialists or Other Care Team Provider: [facility] attending Dr Robinson Camejo, Surg/Onc resident    Education and Discussions with Family / Patient: Patient declined call to   Time Spent for Care: 45 minutes  More than 50% of total time spent on counseling and coordination of care as described above  Current Length of Stay: 1 day(s)  Current Patient Status: Inpatient   Certification Statement: The patient will continue to require additional inpatient hospital stay due to anemia, post op, blood transfusion, on mid flow oxygen  Discharge Plan: Anticipate discharge in >72 hrs to home  Code Status: Level 1 - Full Code    Subjective:   Mr Lolis Malcolm reports feeling a little better today  He reports some SOB  Denies CP  Reports some leg discomfort at the surgery site but this is controlled  He reports passing gas and feeling close to having a BM  He reports urinating without difficulty     Objective:     Vitals:   Temp (24hrs), Av 2 °F (36 8 °C), Min:97 5 °F (36 4 °C), Max:98 9 °F (37 2 °C)    Temp:  [97 5 °F (36 4 °C)-98 9 °F (37 2 °C)] 98 9 °F (37 2 °C)  HR:  [] 93  Resp:  [13-20] 20  BP: (125-163)/(70-93) 151/82  SpO2:  [89 %-99 %] 95 %  Body mass index is 30 07 kg/m²  Input and Output Summary (last 24 hours):      Intake/Output Summary (Last 24 hours) at 2022 1030  Last data filed at 2022 0821  Gross per 24 hour   Intake 920 ml   Output --   Net 920 ml       Physical Exam:   Physical Exam  Vitals and nursing note reviewed  Constitutional:       Comments: Patient seen sitting in bed, NAD   Cardiovascular:      Rate and Rhythm: Normal rate and regular rhythm  Pulmonary:      Effort: Pulmonary effort is normal  No respiratory distress  Breath sounds: Normal breath sounds  Comments: 8L mid flow  Abdominal:      General: Bowel sounds are normal       Palpations: Abdomen is soft  Tenderness: There is no abdominal tenderness  Musculoskeletal:      Right lower leg: No edema  Left lower leg: No edema  Skin:     General: Skin is warm  Neurological:      Mental Status: He is alert and oriented to person, place, and time  Psychiatric:         Mood and Affect: Mood normal          Behavior: Behavior normal         Additional Data:     Labs:  Results from last 7 days   Lab Units 07/30/22  0521 07/29/22  0703 07/27/22  0943   WBC Thousand/uL 19 20*   < > 22 62*   HEMOGLOBIN g/dL 7 2*   < > 7 7*   HEMATOCRIT % 21 5*   < > 24 0*   PLATELETS Thousands/uL 290   < > 498*   BANDS PCT %  --   --  3   LYMPHO PCT %  --   --  11*   MONO PCT %  --   --  8   EOS PCT %  --   --  2    < > = values in this interval not displayed       Results from last 7 days   Lab Units 07/30/22  0521   SODIUM mmol/L 139   POTASSIUM mmol/L 3 6   CHLORIDE mmol/L 105   CO2 mmol/L 27   BUN mg/dL 33*   CREATININE mg/dL 1 55*   ANION GAP mmol/L 7   CALCIUM mg/dL 9 2   ALBUMIN g/dL 3 7   TOTAL BILIRUBIN mg/dL 7 05*   ALK PHOS U/L 79   ALT U/L 30   AST U/L 46*   GLUCOSE RANDOM mg/dL 105                       Lines/Drains:  Invasive Devices  Report    Peripheral Intravenous Line  Duration           Peripheral IV 07/29/22 Left Arm 1 day                      Imaging: Reviewed radiology reports from this admission including: chest xray    Recent Cultures (last 7 days):         Last 24 Hours Medication List:   Current Facility-Administered Medications   Medication Dose Route Frequency Provider Last Rate    acetaminophen 975 mg Oral [place], [place]      diphenhydrAMINE  25 mg Intravenous Once [place], [place]      docusate sodium  100 mg Oral BID Carol Pa MD      heparin (porcine)  5,000 Units Subcutaneous [place], [place]      hydrochlorothiazide  25 mg Oral QAM [place], [place]      lactated ringers  125 mL/hr Intravenous Continuous [place], [place] 125 mL/hr (07/30/22 0159)    metoprolol succinate  12 5 mg Oral QAM [facility] [place], [place]      ondansetron  4 mg Intravenous Q4H PRN [place], [place]      oxyCODONE  5 mg Oral Q4H PRN [place], [place]      pantoprazole  40 mg Oral Early Morning [place], [place]      polyethylene glycol  17 g Oral Daily Carol Pa MD      potassium chloride  20 mEq [place], [place]      potassium chloride  40 mEq Oral Once Tiffany Neville MD      predniSONE  60 mg Oral Daily [place], [place]      traMADol  50 mg Oral Q6H [address] [place], [place]          Today, Patient Was Seen By: Magui Soto PA-C    **Please Note: This note may have been constructed using a voice recognition system  **

## 2022-07-30 NOTE — PROGRESS NOTES
Surgical Oncology  Progress Note   Nathalie Savage 75 y o  male MRN: [id number]  Unit/Bed#: [facility] 910-01 Encounter: [id number]    Assessment:    History of Present Illness  : This is a 58-year-old gentleman with a new diagnosis of a T4 melanoma of the right medial thigh just superior to the knee  Now s/p wide local excision and R sentinel lymph node biopsy  Anemic to 6 4 on  w/ history of hemolytic anemia and cold antibodies extensively treated with steroid, IVIG, rituximab as well as splenectomy; plan for transfusion give compatible blood is available  8L mid-flow NS satting 95%    AM labs pending  Hgb: 6 4  WBC: 21 4      Plan:  -Regular diet  -Anemia; appreciate Heme/Onc recs: tranfuse to 7 5 w/ compatible blood, Taper prednisone from 60 to 40mg, CEE, LDH, Haptoglobin, Hepatic function, retic count  -appreciate medicine recs; echo, BNP, CXR  -pain control as needed  -incentive spirometry   -DVT prophylaxis   -Start home pradaxa today   -encourage ambulation   -plan for transfer to medicine team today    Subjective/Objective     Subjective: Patient seen and examined at bedside, in no acute distress  No acute events overnight  Patient's pain is well controlled  He denies nausea or vomiting  Passing gas and stool  Objective:     Vitals:Blood pressure 157/80, pulse 93, temperature 98 7 °F (37 1 °C), resp  rate 16, height 5' 7" (1 702 m), weight 87 1 kg (192 lb), SpO2 96 %  ,Body mass index is 30 07 kg/m²       Temp (24hrs), Av 1 °F (36 7 °C), Min:97 5 °F (36 4 °C), Max:98 7 °F (37 1 °C)  Current: Temperature: 98 7 °F (37 1 °C)      Intake/Output Summary (Last 24 hours) at 2022 0657  Last data filed at 2022 1900  Gross per 24 hour   Intake 620 ml   Output --   Net 620 ml       Invasive Devices  Report    Peripheral Intravenous Line  Duration           Peripheral IV 22 Left Arm <1 day                Physical Exam:  General: No acute distress, alert and oriented  CV: Well perfused, regular rate and rhythm  Lungs: Normal work of breathing, no increased respiratory effort  Abdomen: Soft, non-tender, non-distended     Extremities: RLE edema stable; otherwise no clubbing or cyanosis  Skin: Right medial knee incision c/d/i; otherwise Warm, dry    Lab Results: Results: I have personally reviewed all pertinent laboratory/tests results  VTE Prophylaxis: Sequential compression device (Venodyne)  and Heparin    Tiffany Neville MD  7/30/2022

## 2022-07-30 NOTE — PROGRESS NOTES
Oncology Progress Note  Maile Chatterjee 75 y o  male MRN: [id number]  Unit/Bed#: [facility] 910-01 Encounter: [id number]      /82   Pulse 93   Temp 98 9 °F (37 2 °C)   Resp 20   Ht 5' 7" (1 702 m)   Wt 87 1 kg (192 lb)   SpO2 95%   BMI 30 07 kg/m²     Subjective:  He feels slightly better than yesterday  Hemoglobin is 7 2  LD is 1000  Reticulocyte count is not available yet  He told me that his last rituximab treatment is back in 2018  He is status post splenectomy  We have a unit of red blood cell product which is least incompatible  Dr Asenath Claude already side form, so that he can have least incompatible blood product  He is going to receive this transfusion  He is afebrile  He has no complaint of pain  He has usual shortness of breath  Objective:    General Appearance:    Alert, oriented        Eyes:    PERRL   Ears:    Normal external ear canals, both ears   Nose:   Nares normal, septum midline   Throat:   Mucosa moist  Pharynx without injection  Neck:   Supple       Lungs:     Clear to auscultation bilaterally   Chest Wall:    No tenderness or deformity    Heart:    Regular rate and rhythm       Abdomen:     Soft, non-tender, bowel sounds +, no organomegaly           Extremities:   Extremities no cyanosis or edema       Skin:   no rash or icterus      Lymph nodes:   Cervical, supraclavicular, and axillary nodes normal   Neurologic:   CNII-XII intact, normal strength, sensation and reflexes     throughout        Recent Results (from the past 48 hour(s))   CBC and differential    Collection Time: 07/29/22  7:03 AM   Result Value Ref Range    WBC 21 47 (H) 4 31 - 10 16 Thousand/uL    RBC 1 23 (L) 3 88 - 5 62 Million/uL    Hemoglobin 6 4 (LL) 12 0 - 17 0 g/dL    Hematocrit 18 8 (L) 36 5 - 49 3 %     (H) 82 - 98 fL    MCH 52 0 (H) 26 8 - 34 3 pg    MCHC 34 0 31 4 - 37 4 g/dL    RDW 25 3 (H) 11 6 - 15 1 %    MPV 9 4 8 9 - 12 7 fL    Platelets 718 933 - 959 Thousands/uL    nRBC 29 /100 WBCs Type and screen    Collection Time: 07/29/22  9:35 AM   Result Value Ref Range    ABO Grouping A     Rh Factor Positive     Antibody Screen Positive     Specimen Expiration Date [id number]    Prepare Leukoreduced RBC: 1 Units, CMV Negative, Irradiated    Collection Time: 07/29/22  2:48 PM   Result Value Ref Range    Unit Product Code A8834O98     Unit Number [id number]     Unit ABO A     Unit [facility] POS     Crossmatch Incompatible     Unit Dispense Status IX     Unit Product Volume 350 mL   Platelet count    Collection Time: 07/29/22  6:56 PM   Result Value Ref Range    Platelets 042 747 - 086 Thousands/uL    MPV 9 2 8 9 - 12 7 fL   Direct antiglobulin test    Collection Time: 07/29/22  6:56 PM   Result Value Ref Range    DIRECT EVELYN 3+  Positive    CEE IGG    Collection Time: 07/29/22  6:56 PM   Result Value Ref Range    CEE IgG Negative    Anti-complement    Collection Time: 07/29/22  6:56 PM   Result Value Ref Range    CEE C3 Negative    Comprehensive metabolic panel    Collection Time: 07/30/22  5:21 AM   Result Value Ref Range    Sodium 139 135 - 147 mmol/L    Potassium 3 6 3 5 - 5 3 mmol/L    Chloride 105 96 - 108 mmol/L    CO2 27 21 - 32 mmol/L    ANION GAP 7 4 - 13 mmol/L    BUN 33 (H) 5 - 25 mg/dL    Creatinine 1 55 (H) 0 60 - 1 30 mg/dL    Glucose 105 65 - 140 mg/dL    Calcium 9 2 8 3 - 10 1 mg/dL    AST 46 (H) 5 - 45 U/L    ALT 30 12 - 78 U/L    Alkaline Phosphatase 79 46 - 116 U/L    Total Protein 6 6 6 4 - 8 4 g/dL    Albumin 3 7 3 5 - 5 0 g/dL    Total Bilirubin 7 05 (H) 0 20 - 1 00 mg/dL    eGFR 45 ml/min/1 73sq m   CBC and differential    Collection Time: 07/30/22  5:21 AM   Result Value Ref Range    WBC 19 20 (H) 4 31 - 10 16 Thousand/uL    RBC 1 40 (L) 3 88 - 5 62 Million/uL    Hemoglobin 7 2 (L) 12 0 - 17 0 g/dL    Hematocrit 21 5 (L) 36 5 - 49 3 %     (H) 82 - 98 fL    MCH 46 4 (H) 26 8 - 34 3 pg    MCHC 30 2 (L) 31 4 - 37 4 g/dL    MPV 9 9 8 9 - 12 7 fL    Platelets 379 581 - 558 Thousands/uL   Magnesium    Collection Time: 07/30/22  5:21 AM   Result Value Ref Range    Magnesium 2 4 1 6 - 2 6 mg/dL   Phosphorus    Collection Time: 07/30/22  5:21 AM   Result Value Ref Range    Phosphorus 3 5 2 3 - 4 1 mg/dL   NT-BNP PRO    Collection Time: 07/30/22  5:21 AM   Result Value Ref Range    NT-proBNP 815 (H) <125 pg/mL   LD,Blood    Collection Time: 07/30/22  5:21 AM   Result Value Ref Range    LD 1,077 (H) 81 - 234 U/L         XR chest portable    Result Date: 7/29/2022  Narrative: CHEST INDICATION:   Shortness of breath  COMPARISON:  12/18/2020  EXAM PERFORMED/VIEWS:  XR CHEST PORTABLE FINDINGS:  Monitoring leads and clips project over the chest  Cardiomediastinal silhouette appears stable  The lungs are clear  No pneumothorax or pleural effusion  Degenerative changes  Left upper quadrant embolization coils  Impression: No acute cardiopulmonary disease  Workstation performed: [id number]     NM lymphatic melanoma    Result Date: 7/29/2022  Narrative: SENTINEL NODE LYMPHOSCINTIGRAPHY INDICATION:   Lower extremity melanoma  FINDINGS: 0 55 mCi Tc-99m sulfur colloid (0 6 cc volume) was administered intradermally in divided doses by Dr Lino Finney in the region of the patients right lower extremity melanoma  Scintigraphic images were obtained over the right groin and right lower extremity in multiple projections  Single node is identified within the right groin Using scintigraphic guidance, the corresponding skin site was marked with an indelible marker  The patient was transferred to the operating room in satisfactory condition  Impression: 1  [place] lymph node localized to right inguinal region  Workstation performed: [id number]     US inguinal area    Addendum Date: 7/6/2022 Addendum:   ADDENDUM: Images were also obtained through the right groin/inguinal region with no evidence for enlarged lymph nodes      Result Date: 7/6/2022  Narrative: RIGHT ANTERIOR THIGH ULTRASOUND INDICATION: C43 71: Malignant melanoma of right lower limb, including hip  COMPARISON:  Correlation is made with the prior CT study dated 4/30/2022  TECHNIQUE:   Real-time ultrasound of the right anterior thigh was performed with a linear transducer with both volumetric sweeps and still imaging techniques  FINDINGS:  There is a skin lesion demonstrating posterior shadowing measuring approximately 1 3 cm in width with a depth of 0 8 cm likely compatible with known melanoma  No adjacent enlarged lymph nodes are seen in this region  Impression: Skin lesion as above most likely compatible with known melanoma  No adjacent enlarged lymph nodes  Workstation performed: [id number]         Assessment :  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  Steroid resistant  We reduced the dose of steroid  Newly diagnosed melanoma, stage yet to be known  Status post wide excision and sentinel lymph node biopsy which was done yesterday for melanoma  Plan:  The patient is a 69-year-old gentleman with long history of autoimmune hemolytic anemia with warm and cold antibody  He has been extensively treated with prednisone, rituximab, IVIG as well as splenectomy  Recently, he has progressive anemia  However, his hemoglobin did not respond to prednisone  Therefore, we recommended to reduce the dose of prednisone to 40 mg  Based on the laboratory, he appeared to have active hemolysis with high LDH  Reticulocyte is still pending  Sherry test is positive  His hemoglobin is 7 2, today  He is going to have a red blood cell product transfusion which is least incompatible  We discussed the further management of autoimmune hemolytic anemia  Since he is status post splenectomy, I think rituximab of is better option than IVIG  I am going to check his hepatitis today  Assuming that he is negative for hepatitis, he is going to start weekly rituximab tomorrow  He is in agreement with my recommendations    Regarding the melanoma, I would like to wait for the surgical staging

## 2022-07-30 NOTE — NURSING NOTE
Pt sating in mid [de-identified] on 8L midflow  Pt stating dyspnea on exertion, but not at rest  RN increased flow to 10 L midflow  RRT K  tiffanie Wilder sx Anna and Steph Postal aware  Mariza Guardado at bedside   See new orders

## 2022-07-31 ENCOUNTER — APPOINTMENT (INPATIENT)
Dept: RADIOLOGY | Facility: HOSPITAL | Age: 67
DRG: 570 | End: 2022-07-31
Payer: MEDICARE

## 2022-07-31 ENCOUNTER — APPOINTMENT (INPATIENT)
Dept: NON INVASIVE DIAGNOSTICS | Facility: HOSPITAL | Age: 67
DRG: 570 | End: 2022-07-31
Payer: MEDICARE

## 2022-07-31 PROBLEM — I26.09 PULMONARY EMBOLISM WITH ACUTE COR PULMONALE (HCC): Status: ACTIVE | Noted: 2017-06-06

## 2022-07-31 LAB
2HR DELTA HS TROPONIN: 106 NG/L
4HR DELTA HS TROPONIN: 45 NG/L
ABO GROUP BLD BPU: NORMAL
ALBUMIN SERPL BCP-MCNC: 3.5 G/DL (ref 3.5–5)
ALP SERPL-CCNC: 93 U/L (ref 46–116)
ALT SERPL W P-5'-P-CCNC: 41 U/L (ref 12–78)
ANION GAP SERPL CALCULATED.3IONS-SCNC: 7 MMOL/L (ref 4–13)
AORTIC ROOT: 3.6 CM
APICAL FOUR CHAMBER EJECTION FRACTION: 58 %
APTT PPP: 21 SECONDS (ref 23–37)
APTT PPP: 65 SECONDS (ref 23–37)
APTT PPP: 68 SECONDS (ref 23–37)
ASCENDING AORTA: 3.5 CM
AST SERPL W P-5'-P-CCNC: 66 U/L (ref 5–45)
ATRIAL RATE: 103 BPM
BILIRUB SERPL-MCNC: 9.44 MG/DL (ref 0.2–1)
BPU ID: NORMAL
BUN SERPL-MCNC: 29 MG/DL (ref 5–25)
CALCIUM SERPL-MCNC: 8.8 MG/DL (ref 8.3–10.1)
CARDIAC TROPONIN I PNL SERPL HS: 383 NG/L
CARDIAC TROPONIN I PNL SERPL HS: 428 NG/L
CARDIAC TROPONIN I PNL SERPL HS: 489 NG/L
CHLORIDE SERPL-SCNC: 105 MMOL/L (ref 96–108)
CO2 SERPL-SCNC: 28 MMOL/L (ref 21–32)
CREAT SERPL-MCNC: 1.37 MG/DL (ref 0.6–1.3)
CROSSMATCH: NORMAL
E WAVE DECELERATION TIME: 140 MS
FRACTIONAL SHORTENING: 36 (ref 28–44)
GFR SERPL CREATININE-BSD FRML MDRD: 52 ML/MIN/1.73SQ M
GLUCOSE SERPL-MCNC: 165 MG/DL (ref 65–140)
HAPTOGLOB SERPL-MCNC: <10 MG/DL (ref 32–363)
HCT VFR BLD AUTO: 22.3 % (ref 36.5–49.3)
HGB BLD-MCNC: 8 G/DL (ref 12–17)
INR PPP: 1.02 (ref 0.84–1.19)
INTERVENTRICULAR SEPTUM IN DIASTOLE (PARASTERNAL SHORT AXIS VIEW): 1.2 CM
INTERVENTRICULAR SEPTUM: 1.2 CM (ref 0.6–1.1)
LAAS-AP2: 20.3 CM2
LAAS-AP4: 20.2 CM2
LEFT ATRIUM SIZE: 3.7 CM
LEFT INTERNAL DIMENSION IN SYSTOLE: 2.1 CM (ref 2.1–4)
LEFT VENTRICULAR INTERNAL DIMENSION IN DIASTOLE: 3.3 CM (ref 3.5–6)
LEFT VENTRICULAR POSTERIOR WALL IN END DIASTOLE: 1.3 CM
LEFT VENTRICULAR STROKE VOLUME: 28 ML
LVSV (TEICH): 28 ML
MCH RBC QN AUTO: 54.8 PG (ref 26.8–34.3)
MCHC RBC AUTO-ENTMCNC: 35.9 G/DL (ref 31.4–37.4)
MCV RBC AUTO: 153 FL (ref 82–98)
MV E'TISSUE VEL-SEP: 12 CM/S
MV PEAK A VEL: 1.04 M/S
MV PEAK E VEL: 61 CM/S
MV STENOSIS PRESSURE HALF TIME: 41 MS
MV VALVE AREA P 1/2 METHOD: 5.37
NT-PROBNP SERPL-MCNC: 2757 PG/ML
P AXIS: 46 DEGREES
PLATELET # BLD AUTO: 271 THOUSANDS/UL (ref 149–390)
PMV BLD AUTO: 10 FL (ref 8.9–12.7)
POTASSIUM SERPL-SCNC: 3.8 MMOL/L (ref 3.5–5.3)
PR INTERVAL: 152 MS
PROT SERPL-MCNC: 6.1 G/DL (ref 6.4–8.4)
PROTHROMBIN TIME: 13.6 SECONDS (ref 11.6–14.5)
QRS AXIS: 46 DEGREES
QRSD INTERVAL: 84 MS
QT INTERVAL: 346 MS
QTC INTERVAL: 453 MS
RA PRESSURE ESTIMATED: 5 MMHG
RBC # BLD AUTO: 1.46 MILLION/UL (ref 3.88–5.62)
RIGHT ATRIUM AREA SYSTOLE A4C: 22.5 CM2
RIGHT VENTRICLE ID DIMENSION: 4.9 CM
RV PSP: 80 MMHG
SL CV LEFT ATRIUM LENGTH A2C: 6.3 CM
SL CV LV EF: 70
SL CV PED ECHO LEFT VENTRICLE DIASTOLIC VOLUME (MOD BIPLANE) 2D: 44 ML
SL CV PED ECHO LEFT VENTRICLE SYSTOLIC VOLUME (MOD BIPLANE) 2D: 15 ML
SODIUM SERPL-SCNC: 140 MMOL/L (ref 135–147)
T WAVE AXIS: 28 DEGREES
TR MAX PG: 75 MMHG
TR PEAK VELOCITY: 4.5 M/S
TRICUSPID VALVE PEAK REGURGITATION VELOCITY: 4.46 M/S
UNIT DISPENSE STATUS: NORMAL
UNIT PRODUCT CODE: NORMAL
UNIT PRODUCT VOLUME: 350 ML
UNIT RH: NORMAL
VENTRICULAR RATE: 103 BPM
WBC # BLD AUTO: 24.26 THOUSAND/UL (ref 4.31–10.16)

## 2022-07-31 PROCEDURE — 99447 NTRPROF PH1/NTRNET/EHR 11-20: CPT | Performed by: RADIOLOGY

## 2022-07-31 PROCEDURE — 93306 TTE W/DOPPLER COMPLETE: CPT

## 2022-07-31 PROCEDURE — 99232 SBSQ HOSP IP/OBS MODERATE 35: CPT | Performed by: PHYSICIAN ASSISTANT

## 2022-07-31 PROCEDURE — 83880 ASSAY OF NATRIURETIC PEPTIDE: CPT | Performed by: NURSE PRACTITIONER

## 2022-07-31 PROCEDURE — G1004 CDSM NDSC: HCPCS

## 2022-07-31 PROCEDURE — 80053 COMPREHEN METABOLIC PANEL: CPT | Performed by: PHYSICIAN ASSISTANT

## 2022-07-31 PROCEDURE — 93306 TTE W/DOPPLER COMPLETE: CPT | Performed by: INTERNAL MEDICINE

## 2022-07-31 PROCEDURE — 99223 1ST HOSP IP/OBS HIGH 75: CPT | Performed by: INTERNAL MEDICINE

## 2022-07-31 PROCEDURE — 99024 POSTOP FOLLOW-UP VISIT: CPT | Performed by: SURGERY

## 2022-07-31 PROCEDURE — 85730 THROMBOPLASTIN TIME PARTIAL: CPT | Performed by: NURSE PRACTITIONER

## 2022-07-31 PROCEDURE — 85027 COMPLETE CBC AUTOMATED: CPT | Performed by: NURSE PRACTITIONER

## 2022-07-31 PROCEDURE — 71275 CT ANGIOGRAPHY CHEST: CPT

## 2022-07-31 PROCEDURE — 85610 PROTHROMBIN TIME: CPT | Performed by: NURSE PRACTITIONER

## 2022-07-31 PROCEDURE — 94760 N-INVAS EAR/PLS OXIMETRY 1: CPT

## 2022-07-31 PROCEDURE — 85730 THROMBOPLASTIN TIME PARTIAL: CPT | Performed by: INTERNAL MEDICINE

## 2022-07-31 PROCEDURE — 99232 SBSQ HOSP IP/OBS MODERATE 35: CPT | Performed by: INTERNAL MEDICINE

## 2022-07-31 PROCEDURE — 84484 ASSAY OF TROPONIN QUANT: CPT | Performed by: NURSE PRACTITIONER

## 2022-07-31 PROCEDURE — 93005 ELECTROCARDIOGRAM TRACING: CPT

## 2022-07-31 PROCEDURE — 93010 ELECTROCARDIOGRAM REPORT: CPT | Performed by: INTERNAL MEDICINE

## 2022-07-31 RX ORDER — HEPARIN SODIUM 10000 [USP'U]/100ML
3-30 INJECTION, SOLUTION INTRAVENOUS
Status: DISCONTINUED | OUTPATIENT
Start: 2022-07-31 | End: 2022-08-04

## 2022-07-31 RX ORDER — HEPARIN SODIUM 1000 [USP'U]/ML
6800 INJECTION, SOLUTION INTRAVENOUS; SUBCUTANEOUS
Status: DISCONTINUED | OUTPATIENT
Start: 2022-07-31 | End: 2022-08-04

## 2022-07-31 RX ORDER — METHYLPREDNISOLONE SODIUM SUCCINATE 125 MG/2ML
80 INJECTION, POWDER, LYOPHILIZED, FOR SOLUTION INTRAMUSCULAR; INTRAVENOUS ONCE
Status: COMPLETED | OUTPATIENT
Start: 2022-07-31 | End: 2022-07-31

## 2022-07-31 RX ORDER — HEPARIN SODIUM 1000 [USP'U]/ML
3400 INJECTION, SOLUTION INTRAVENOUS; SUBCUTANEOUS
Status: DISCONTINUED | OUTPATIENT
Start: 2022-07-31 | End: 2022-08-04

## 2022-07-31 RX ORDER — HEPARIN SODIUM 1000 [USP'U]/ML
6800 INJECTION, SOLUTION INTRAVENOUS; SUBCUTANEOUS ONCE
Status: COMPLETED | OUTPATIENT
Start: 2022-07-31 | End: 2022-07-31

## 2022-07-31 RX ADMIN — TRAMADOL HYDROCHLORIDE 50 MG: 50 TABLET, COATED ORAL at 11:00

## 2022-07-31 RX ADMIN — HYDROCHLOROTHIAZIDE 25 MG: 25 TABLET ORAL at 08:24

## 2022-07-31 RX ADMIN — DOCUSATE SODIUM 100 MG: 100 CAPSULE, LIQUID FILLED ORAL at 15:32

## 2022-07-31 RX ADMIN — DOCUSATE SODIUM 100 MG: 100 CAPSULE, LIQUID FILLED ORAL at 08:25

## 2022-07-31 RX ADMIN — ACETAMINOPHEN 975 MG: 325 TABLET ORAL at 05:21

## 2022-07-31 RX ADMIN — POLYETHYLENE GLYCOL 3350 17 G: 17 POWDER, FOR SOLUTION ORAL at 08:26

## 2022-07-31 RX ADMIN — METOPROLOL SUCCINATE 12.5 MG: 25 TABLET, EXTENDED RELEASE ORAL at 08:25

## 2022-07-31 RX ADMIN — HEPARIN SODIUM 18 UNITS/KG/HR: 10000 INJECTION, SOLUTION INTRAVENOUS at 19:10

## 2022-07-31 RX ADMIN — IOHEXOL 70 ML: 350 INJECTION, SOLUTION INTRAVENOUS at 00:52

## 2022-07-31 RX ADMIN — PREDNISONE 40 MG: 20 TABLET ORAL at 08:24

## 2022-07-31 RX ADMIN — POTASSIUM CHLORIDE 20 MEQ: 1500 TABLET, EXTENDED RELEASE ORAL at 08:24

## 2022-07-31 RX ADMIN — PANTOPRAZOLE SODIUM 40 MG: 40 TABLET, DELAYED RELEASE ORAL at 05:21

## 2022-07-31 RX ADMIN — HEPARIN SODIUM 18 UNITS/KG/HR: 10000 INJECTION, SOLUTION INTRAVENOUS at 03:01

## 2022-07-31 RX ADMIN — METHYLPREDNISOLONE SODIUM SUCCINATE 80 MG: 125 INJECTION, POWDER, FOR SOLUTION INTRAMUSCULAR; INTRAVENOUS at 02:18

## 2022-07-31 RX ADMIN — HEPARIN SODIUM 6800 UNITS: 1000 INJECTION INTRAVENOUS; SUBCUTANEOUS at 03:03

## 2022-07-31 NOTE — ASSESSMENT & PLAN NOTE
· Patient with progressive dyspnea on exertion for the past 3 weeks prior to admission  · Likely multifactorial in the setting of anemia and PE  · Plan as per below

## 2022-07-31 NOTE — ASSESSMENT & PLAN NOTE
· Follows with Hematology, Dr Maude Bundy, as outpatient  · Heme/Onc following here, I discussed with them today and appreciate their ongoing recommendations   · Prednisone dose was decreased during admission from 60mg to 40mg per Heme/Onc recs  · Hemoglobin 6 4 on admission  · With warm and cold antibody  · LDH elevated at 1,077  · Sherry test positive  · Retic count elevated  · Received 1 unit least incompatible blood transfusion on 7/30/22  · Heme/Onc now recommending to hold off on rituximab for now

## 2022-07-31 NOTE — PROGRESS NOTES
Progress Note - Darren De Dios 75 y o  male MRN: [id number]    Unit/Bed#: [facility] 910-01 Encounter: [id number]      Assessment:  76 yo male w/ melanoma of the R leg s/p wide local excision and SLNB on 7/29  Now with R middle lobe pulmonary embolisms w/ right heart strain    Overnight desaturations and dypnea on exertion CTAC performed concerning from PE with right heart strain  HFNC 60% 55L  Tachycardic, otherwise stable    Plan:  -Continue diet  -Continue hep gtt for PE, will require ECHO to evaluate heart strain  If worsening consider IR for thrombectomy  -Appreciate heme onc/medicine recommendations  -Pain control PRN  -Remainder of care per primary    Subjective:   Overnight events as noted  Patient is feeling better this morning  Currently denies any chest pain or shortness of breath  Objective:     Vitals: Blood pressure 164/89, pulse 102, temperature 98 2 °F (36 8 °C), resp  rate 20, height 5' 7" (1 702 m), weight 87 1 kg (192 lb), SpO2 96 %  ,Body mass index is 30 07 kg/m²  Intake/Output Summary (Last 24 hours) at 7/31/2022 3675  Last data filed at 7/30/2022 2205  Gross per 24 hour   Intake 1995 ml   Output 1600 ml   Net 395 ml       Physical Exam:   General: NAD  HENT: NCAT MMM  Neck: supple, no JVD  CV: tachycardic  Lungs: HFNC  ABD: Soft, nontender, nondistended  Extrem: R thigh dressing incision c/d/i  R groin incision c/d/i   No erythema swelling or hematoma  Neuro: AAOx3       Invasive Devices  Report    Peripheral Intravenous Line  Duration           Peripheral IV 07/29/22 Left Arm 1 day    Peripheral IV 07/31/22 Left;Ventral (anterior) Forearm <1 day

## 2022-07-31 NOTE — QUICK NOTE
CTA chest with multiple subsegmental PE, right heart strain on CTA  D/W CC AP - will evaluate with bedside echo  Heparin infusion initiated, HFNC, step down 2 level updated

## 2022-07-31 NOTE — ASSESSMENT & PLAN NOTE
· Creatinine 1 37 today, baseline appears to be around 1 2-1 3  · Monitor  · Avoid nephrotoxins and hypotension

## 2022-07-31 NOTE — PROGRESS NOTES
Oncology Progress Note  Rickey Alexander 75 y o  male MRN: [id number]  Unit/Bed#: [facility] 910-01 Encounter: [id number]      /91   Pulse (!) 113   Temp 98 2 °F (36 8 °C)   Resp 20   Ht 5' 7" (1 702 m)   Wt 87 1 kg (192 lb)   SpO2 91%   BMI 30 07 kg/m²     Subjective:  Overnight, he developed pulmonary embolism  He was off the dabigatran for 2 days prior to the wide excision and sentinel lymph node biopsy for his melanoma  He has history of apixaban failure  He received 1 unit of least incompatible red blood cell product yesterday  His hemoglobin is 8 0  He has no chest pain  He is afebrile  He is on oxygen  Objective:    General Appearance:    Alert, oriented        Eyes:    PERRL   Ears:    Normal external ear canals, both ears   Nose:   Nares normal, septum midline   Throat:   Mucosa moist  Pharynx without injection  Neck:   Supple       Lungs:     Clear to auscultation bilaterally   Chest Wall:    No tenderness or deformity    Heart:    Regular rate and rhythm       Abdomen:     Soft, non-tender, bowel sounds +, no organomegaly           Extremities:   Extremities no cyanosis or edema       Skin:   no rash or icterus      Lymph nodes:   Cervical, supraclavicular, and axillary nodes normal   Neurologic:   CNII-XII intact, normal strength, sensation and reflexes     throughout        Recent Results (from the past 48 hour(s))   Type and screen    Collection Time: 07/29/22  9:35 AM   Result Value Ref Range    ABO Grouping A     Rh Factor Positive     Antibody Screen Positive     Specimen Expiration Date [id number]    Platelet count    Collection Time: 07/29/22  6:56 PM   Result Value Ref Range    Platelets 293 288 - 117 Thousands/uL    MPV 9 2 8 9 - 12 7 fL   Direct antiglobulin test    Collection Time: 07/29/22  6:56 PM   Result Value Ref Range    DIRECT EVELYN 3+  Positive    CEE IGG    Collection Time: 07/29/22  6:56 PM   Result Value Ref Range    CEE IgG Negative    Anti-complement Collection Time: 07/29/22  6:56 PM   Result Value Ref Range    CEE C3 Negative    Comprehensive metabolic panel    Collection Time: 07/30/22  5:21 AM   Result Value Ref Range    Sodium 139 135 - 147 mmol/L    Potassium 3 6 3 5 - 5 3 mmol/L    Chloride 105 96 - 108 mmol/L    CO2 27 21 - 32 mmol/L    ANION GAP 7 4 - 13 mmol/L    BUN 33 (H) 5 - 25 mg/dL    Creatinine 1 55 (H) 0 60 - 1 30 mg/dL    Glucose 105 65 - 140 mg/dL    Calcium 9 2 8 3 - 10 1 mg/dL    AST 46 (H) 5 - 45 U/L    ALT 30 12 - 78 U/L    Alkaline Phosphatase 79 46 - 116 U/L    Total Protein 6 6 6 4 - 8 4 g/dL    Albumin 3 7 3 5 - 5 0 g/dL    Total Bilirubin 7 05 (H) 0 20 - 1 00 mg/dL    eGFR 45 ml/min/1 73sq m   CBC and differential    Collection Time: 07/30/22  5:21 AM   Result Value Ref Range    WBC 19 20 (H) 4 31 - 10 16 Thousand/uL    RBC 1 40 (L) 3 88 - 5 62 Million/uL    Hemoglobin 7 2 (L) 12 0 - 17 0 g/dL    Hematocrit 21 5 (L) 36 5 - 49 3 %     (H) 82 - 98 fL    MCH 46 4 (H) 26 8 - 34 3 pg    MCHC 30 2 (L) 31 4 - 37 4 g/dL    MPV 9 9 8 9 - 12 7 fL    Platelets 668 469 - 675 Thousands/uL   Magnesium    Collection Time: 07/30/22  5:21 AM   Result Value Ref Range    Magnesium 2 4 1 6 - 2 6 mg/dL   Phosphorus    Collection Time: 07/30/22  5:21 AM   Result Value Ref Range    Phosphorus 3 5 2 3 - 4 1 mg/dL   NT-BNP PRO    Collection Time: 07/30/22  5:21 AM   Result Value Ref Range    NT-proBNP 815 (H) <125 pg/mL   LD,Blood    Collection Time: 07/30/22  5:21 AM   Result Value Ref Range    LD 1,077 (H) 81 - 234 U/L   Haptoglobin    Collection Time: 07/30/22  5:21 AM   Result Value Ref Range    Haptoglobin <10 (L) 32 - 363 mg/dL   Retic Count    Collection Time: 07/30/22  5:21 AM   Result Value Ref Range    Retic Ct Abs 574,500 (H) 14,356 - 105,094    Retic Ct Pct >30 00 (H) 0 37 - 1 87 %   Bilirubin, direct    Collection Time: 07/30/22  5:21 AM   Result Value Ref Range    Bilirubin, Direct 0 77 (H) 0 00 - 0 20 mg/dL   Manual Differential(PHLEBS Do Not Order)    Collection Time: 07/30/22  5:21 AM   Result Value Ref Range    Segmented % 77 (H) 43 - 75 %    Lymphocytes % 13 (L) 14 - 44 %    Monocytes % 5 4 - 12 %    Eosinophils, % 0 0 - 6 %    Basophils % 0 0 - 1 %    Atypical Lymphocytes % 5 (H) <=0 %    Absolute Neutrophils 14 78 (H) 1 85 - 7 62 Thousand/uL    Lymphocytes Absolute 2 50 0 60 - 4 47 Thousand/uL    Monocytes Absolute 0 96 0 00 - 1 22 Thousand/uL    Eosinophils Absolute 0 00 0 00 - 0 40 Thousand/uL    Basophils Absolute 0 00 0 00 - 0 10 Thousand/uL    Total Counted      nRBC 77 (H) 0 - 2 /100 WBC    RBC Morphology Present     Anisocytosis Present     Basophilic Stippling Present     Wheeler-Ronco Bodies Present     Macrocytes Present     Microcytes Present     Pappenheimer Bodies Present     Polychromasia Present     Platelet Estimate Adequate Adequate    Giant PLTs Present    Hepatitis B surface antigen    Collection Time: 07/30/22 10:15 AM   Result Value Ref Range    Hepatitis B Surface Ag Non-reactive Non-reactive, NonReactive - Confirmed   Hepatitis B surface antibody    Collection Time: 07/30/22 10:15 AM   Result Value Ref Range    Hep B S Ab 3 43 mIU/mL   Hepatitis B core antibody, total    Collection Time: 07/30/22 10:15 AM   Result Value Ref Range    Hep B Core Total Ab Non-reactive Non-reactive   Hepatitis C antibody    Collection Time: 07/30/22 10:15 AM   Result Value Ref Range    Hepatitis C Ab Non-reactive Non-reactive   COVID only    Collection Time: 07/30/22  8:25 PM    Specimen: Nose; Nares   Result Value Ref Range    SARS-CoV-2 Negative Negative   HS Troponin 0hr (reflex protocol)    Collection Time: 07/31/22  2:52 AM   Result Value Ref Range    hs TnI 0hr 383 (H) "Refer to ACS Flowchart"- see link ng/L   NT-BNP PRO    Collection Time: 07/31/22  2:52 AM   Result Value Ref Range    NT-proBNP 2,757 (H) <125 pg/mL   APTT    Collection Time: 07/31/22  2:52 AM   Result Value Ref Range    PTT 21 (L) 23 - 37 seconds   CBC    Collection Time: 07/31/22  2:52 AM   Result Value Ref Range    WBC 24 26 (H) 4 31 - 10 16 Thousand/uL    RBC 1 46 (L) 3 88 - 5 62 Million/uL    Hemoglobin 8 0 (L) 12 0 - 17 0 g/dL    Hematocrit 22 3 (L) 36 5 - 49 3 %     (H) 82 - 98 fL    MCH 54 8 (H) 26 8 - 34 3 pg    MCHC 35 9 31 4 - 37 4 g/dL    Platelets 942 034 - 270 Thousands/uL    MPV 10 0 8 9 - 12 7 fL   Protime-INR    Collection Time: 07/31/22  2:52 AM   Result Value Ref Range    Protime 13 6 11 6 - 14 5 seconds    INR 1 02 0 84 - 1 19   ECG 12 lead    Collection Time: 07/31/22  2:52 AM   Result Value Ref Range    Ventricular Rate 103 BPM    Atrial Rate 103 BPM    ME Interval 152 ms    QRSD Interval 84 ms    QT Interval 346 ms    QTC Interval 453 ms    P Axis 46 degrees    QRS Axis 46 degrees    T Wave [place] 28 degrees   HS Troponin I 2hr    Collection Time: 07/31/22  5:19 AM   Result Value Ref Range    hs TnI 2hr 489 (H) "Refer to ACS Flowchart"- see link ng/L    Delta 2hr hsTnI 106 (H) <20 ng/L   Comprehensive metabolic panel    Collection Time: 07/31/22  5:19 AM   Result Value Ref Range    Sodium 140 135 - 147 mmol/L    Potassium 3 8 3 5 - 5 3 mmol/L    Chloride 105 96 - 108 mmol/L    CO2 28 21 - 32 mmol/L    ANION GAP 7 4 - 13 mmol/L    BUN 29 (H) 5 - 25 mg/dL    Creatinine 1 37 (H) 0 60 - 1 30 mg/dL    Glucose 165 (H) 65 - 140 mg/dL    Calcium 8 8 8 3 - 10 1 mg/dL    AST 66 (H) 5 - 45 U/L    ALT 41 12 - 78 U/L    Alkaline Phosphatase 93 46 - 116 U/L    Total Protein 6 1 (L) 6 4 - 8 4 g/dL    Albumin 3 5 3 5 - 5 0 g/dL    Total Bilirubin 9 44 (H) 0 20 - 1 00 mg/dL    eGFR 52 ml/min/1 73sq m   Prepare Leukoreduced RBC: 1 Units, CMV Negative, Irradiated    Collection Time: 07/31/22  5:55 AM   Result Value Ref Range    Unit Product Code V0456S38     Unit Number [id number]     Unit ABO A     Unit RH POS     Crossmatch Incompatible     Unit Dispense Status Presumed Trans     Unit Product Volume 350 mL         XR chest portable    Result Date: 7/29/2022  Narrative: CHEST INDICATION:   Shortness of breath  COMPARISON:  12/18/2020  EXAM PERFORMED/VIEWS:  XR CHEST PORTABLE FINDINGS:  Monitoring leads and clips project over the chest  Cardiomediastinal silhouette appears stable  The lungs are clear  No pneumothorax or pleural effusion  Degenerative changes  Left upper quadrant embolization coils  Impression: No acute cardiopulmonary disease  Workstation performed: [id number]     NM lymphatic melanoma    Result Date: 7/29/2022  Narrative: SENTINEL NODE LYMPHOSCINTIGRAPHY INDICATION:   Lower extremity melanoma  FINDINGS: 0 55 mCi Tc-99m sulfur colloid (0 6 cc volume) was administered intradermally in divided doses by Dr Lobo Owens in the region of the patients right lower extremity melanoma  Scintigraphic images were obtained over the right groin and right lower extremity in multiple projections  Single node is identified within the right groin Using scintigraphic guidance, the corresponding skin site was marked with an indelible marker  The patient was transferred to the operating room in satisfactory condition  Impression: 1  [place] lymph node localized to right inguinal region  Workstation performed: [id number]     CTA chest pe study    Result Date: 7/31/2022  Narrative: CTA - CHEST WITH IV CONTRAST - PULMONARY ANGIOGRAM INDICATION:   tachypnea, tachycardia, R/O PE  COMPARISON: 4/30/2022  TECHNIQUE: CTA examination of the chest was performed using angiographic technique according to a protocol specifically tailored to evaluate for pulmonary embolism  Axial, sagittal, and coronal 2D reformatted images were created from the source data and  submitted for interpretation  In addition, coronal 3D MIP postprocessing was performed on the acquisition scanner  Radiation dose length product (DLP) for this visit:  572 mGy-cm     This examination, like all CT scans performed in the [facility], was performed utilizing techniques to minimize radiation dose exposure, including the use of iterative reconstruction and automated exposure control  IV Contrast:  70 mL of iohexol (OMNIPAQUE)  FINDINGS: PULMONARY ARTERIAL TREE:  Scattered segmental/subsegmental pulmonary emboli are seen throughout the lung    LUNGS:  Scattered groundglass opacities suggests a small vessel or small airways process process  There is no tracheal or endobronchial lesion  PLEURA:  Unremarkable  HEART/GREAT VESSELS:  RV LV ratio is greater than 0 9 concerning for right ventricular strain    No thoracic aortic aneurysm  MEDIASTINUM AND BRENDA:  Unremarkable  CHEST WALL AND LOWER NECK:   Unremarkable  VISUALIZED STRUCTURES IN THE UPPER ABDOMEN:  Unremarkable  OSSEOUS STRUCTURES:  No acute fracture or destructive osseous lesion  Impression: Right middle lobe pulmonary embolism are noted    RV LV ratio is greater than 0 9 concerning for right ventricular strain     I personally discussed this study with [employer] on 7/31/2022 at 2:17 AM  Workstation performed: [id number]      inguinal area    Addendum Date: 7/6/2022 Addendum:   ADDENDUM: Images were also obtained through the right groin/inguinal region with no evidence for enlarged lymph nodes  Result Date: 7/6/2022  Narrative: RIGHT ANTERIOR THIGH ULTRASOUND INDICATION:   C43 71: Malignant melanoma of right lower limb, including hip  COMPARISON:  Correlation is made with the prior CT study dated 4/30/2022  TECHNIQUE:   Real-time ultrasound of the right anterior thigh was performed with a linear transducer with both volumetric sweeps and still imaging techniques  FINDINGS:  There is a skin lesion demonstrating posterior shadowing measuring approximately 1 3 cm in width with a depth of 0 8 cm likely compatible with known melanoma  No adjacent enlarged lymph nodes are seen in this region  Impression: Skin lesion as above most likely compatible with known melanoma  No adjacent enlarged lymph nodes   Workstation performed: [id number]         Assessment :  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  In direct dominant hyperbilirubinemia, suggesting active hemolysis  Hemoglobin is at least stable  New pulmonary embolism after a few days of being the dabigatran  Plan:  His bone marrow is compensating the hemolysis to keep hemoglobin at least stable  I would hold off the rituximab treatment for now  We will continue to monitor his CBC  Regarding his pulmonary embolism, this may not be the dabigatran failure since this occurred after being off the dabigatran for surgical preparation  I agree with current treatment with IV heparin drip  They are considering possible intervention  Further long-term anticoagulation, he may go back on for does dabigatran when he is ready

## 2022-07-31 NOTE — QUICK NOTE
QUICK NOTE - Deterioration Index  Rita Chinchilla 75 y o  male MRN: [id number]  Unit/Bed#: [facility] 910-01 Encounter: [id number]    Date Paged: 22  Time Paged: 233  Room #: [facility] 910  Arrival Time:   Deterioration index score at time of page: 62 37  %  Spoke with VISHNU Piña from primary team  Need to escalate level of care: yes     PROBLEMS resulting in high DI score:   22 Age 75years old   25% Supplemental oxygen Mid flow nasal cannula   12% Respiratory rate 20   SpO2 87% on 15L mid-flow nasal cannula     Acute hypoxic respiratory failure likely 2/2 volume overload  Received 1 unit PRBC earlier today  Was previously on prednisone 60 mg daily, it was recently decreased to 40 mg daily  Has history of previous PE on Pradaxa at home  AC was held for 48 hours for surgical excision of right thigh malignant melanoma  Was on heparin SC for DVT ppx    PLAN:    CXR appears volume overloaded -- received lasix 40 mg IV at 2016  Continue to hold IVF at this time  Start HFNC -- titrate FiO2 for SpO2 > 92%  CTA to pule out PE  Consider increasing steroid back to previous dose  Continue AC with Pradaxa pending results of CTA  Increase level of care to step down 2, if FiO2 > 70% on HFNC will need SD1    Please contact critical care via [employer] with any questions or concerns       Vitals:   Vitals:    22 2114 22 2115 22 2128 22 2205   BP:    136/80   Pulse:    (!) 109   Resp:    20   Temp:    98 2 °F (36 8 °C)   TempSrc:       SpO2: (!) 88% (!) 88% (!) 88% (!) 87%   Weight:       Height:           Respiratory:  SpO2: SpO2: (!) 87 %, SpO2 Activity: SpO2 Activity: At Rest, SpO2 Device: O2 Device: Mid flow nasal cannula  Nasal Cannula O2 Flow Rate (L/min): 15 L/min    Temperature: Temp (24hrs), Av 1 °F (36 7 °C), Min:97 4 °F (36 3 °C), Max:98 9 °F (37 2 °C)  Current: Temperature: 98 2 °F (36 8 °C)    Labs:   Results from last 7 days   Lab Units 22  0521 22  2215 07/29/22  0703 07/27/22  0943   WBC Thousand/uL 19 20*  --  21 47* 22 62*   HEMOGLOBIN g/dL 7 2*  --  6 4* 7 7*   HEMATOCRIT % 21 5*  --  18 8* 24 0*   PLATELETS Thousands/uL 290 324 381 498*   MONO PCT % 5  --   --  8     Results from last 7 days   Lab Units 07/30/22  0521   SODIUM mmol/L 139   POTASSIUM mmol/L 3 6   CHLORIDE mmol/L 105   CO2 mmol/L 27   BUN mg/dL 33*   CREATININE mg/dL 1 55*   CALCIUM mg/dL 9 2   ALK PHOS U/L 79   ALT U/L 30   AST U/L 46*     Results from last 7 days   Lab Units 07/30/22  0521   MAGNESIUM mg/dL 2 4                   Code Status: Level 1 - Full Code

## 2022-07-31 NOTE — RESPIRATORY THERAPY NOTE
RT Protocol Note  Franny Murillo 75 y o  male MRN: [id number]  Unit/Bed#: [facility] 910-01 Encounter: [id number]    Assessment    Principal Problem:    Dyspnea  Active Problems:    Hx of pulmonary embolus    Gastroesophageal reflux disease    Autoimmune hemolytic anemia    Essential hypertension    Malignant melanoma of leg, right (HCC)    Hypoxia    Elevated serum creatinine    Elevated bilirubin    Leukocytosis      Home Pulmonary Medications:  N/a       Past Medical History:   Diagnosis Date    Anemia 11/2/2016    Anxiety     Arthritis     Autoimmune hemolytic anemia ([address] )     Claustrophobia     COVID 12/2020    DVT (deep venous thrombosis) (HCC)     GERD (gastroesophageal reflux disease)     Hearing loss, right     Hemolytic anemia ([address] )     History of transfusion     2018 - no adverse reaction    Hypertension     Malignant melanoma of leg, right ([facility] ) 7/1/2022    Palpitation     Portal vein thrombosis     PTSD (post-traumatic stress disorder)     Pulmonary emboli (HCC)     Tobacco abuse      Social History     Socioeconomic History    Marital status: /Civil Union     Spouse name: None    Number of children: None    Years of education: None    Highest education level: None   Occupational History    None   Tobacco Use    Smoking status: Current Some Day Smoker     Packs/day: 0 00     Years: 5 00     Pack years: 0 00     Types: Cigars    Smokeless tobacco: Current User     Types: Snuff    Tobacco comment: 5  a week average   Vaping Use    Vaping Use: Never used   Substance and Sexual Activity    Alcohol use:  Yes     Alcohol/week: 6 0 standard drinks     Types: 6 Cans of beer per week     Comment: socially    Drug use: Yes     Frequency: 3 0 times per week     Types: Marijuana     Comment: medical marijuana    Sexual activity: Yes     Partners: Female     Birth control/protection: None   Other Topics Concern    None   Social History Narrative    Daily coffee consumption (2 cups/day)    reitre     Social Determinants of Health     Financial Resource Strain: Not on file   Food Insecurity: Not on file   Transportation Needs: Not on file   Physical Activity: Not on file   Stress: Not on file   Social Connections: Not on file   Intimate Partner Violence: Not on file   Housing Stability: Not on file       Subjective         Objective    Physical Exam:   Assessment Type: Assess only  General Appearance: Awake  Respiratory Pattern: Dyspnea with exertion  Chest Assessment: Chest expansion symmetrical  Bilateral Breath Sounds: Diminished, Crackles    Vitals:  Blood pressure 136/80, pulse (!) 109, temperature 98 2 °F (36 8 °C), resp  rate 20, height 5' 7" (1 702 m), weight 87 1 kg (192 lb), SpO2 95 %  Imaging and other studies: I have personally reviewed pertinent reports  Plan    Respiratory Plan: (P) Vent/NIV/HFNC        Resp Comments: Pt placed on hfnc at this time per order  Pt has no resp hx and takes no resp meds at home  Was on MF at 15L and cont to [address]  into 80's  Pt had CXR/CT study done and covid was neg  Will cont to monitor pt per protocol while on HFNC  Lungs are diminished w/ crackles at bases

## 2022-07-31 NOTE — PLAN OF CARE
Problem: MOBILITY - ADULT  Goal: Maintain or return to baseline ADL function  Description: INTERVENTIONS:  -  Assess patient's ability to carry out ADLs; assess patient's baseline for ADL function and identify physical deficits which impact ability to perform ADLs (bathing, care of mouth/teeth, toileting, grooming, dressing, etc )  - Assess/evaluate cause of self-care deficits   - Assess range of motion  - Assess patient's mobility; develop plan if impaired  - Assess patient's need for assistive devices and provide as appropriate  - Encourage maximum independence but intervene and supervise when necessary  - Involve family in performance of ADLs  - Assess for home care needs following discharge   - Consider OT consult to assist with ADL evaluation and planning for discharge  - Provide patient education as appropriate  Outcome: Progressing  Goal: Maintains/Returns to pre admission functional level  Description: INTERVENTIONS:  - Perform BMAT or MOVE assessment daily    - Set and communicate daily mobility goal to care team and patient/family/caregiver  - Collaborate with rehabilitation services on mobility goals if consulted  - Perform Range of Motion 3 times a day  - Reposition patient every 3 hours    - Dangle patient 3 times a day  - Stand patient 3 times a day  - Ambulate patient 3 times a day  - Out of bed to chair 3 times a day   - Out of bed for meals 3 times a day  - Out of bed for toileting  - Record patient progress and toleration of activity level   Outcome: Progressing     Problem: PAIN - ADULT  Goal: Verbalizes/displays adequate comfort level or baseline comfort level  Description: Interventions:  - Encourage patient to monitor pain and request assistance  - Assess pain using appropriate pain scale  - Administer analgesics based on type and severity of pain and evaluate response  - Implement non-pharmacological measures as appropriate and evaluate response  - Consider cultural and social influences on pain and pain management  - Notify physician/advanced practitioner if interventions unsuccessful or patient reports new pain  Outcome: Progressing     Problem: INFECTION - ADULT  Goal: Absence or prevention of progression during hospitalization  Description: INTERVENTIONS:  - Assess and monitor for signs and symptoms of infection  - Monitor lab/diagnostic results  - Monitor all insertion sites, i e  indwelling lines, tubes, and drains  - Monitor endotracheal if appropriate and nasal secretions for changes in amount and color  - [place] appropriate cooling/warming therapies per order  - Administer medications as ordered  - Instruct and encourage patient and family to use good hand hygiene technique  - Identify and instruct in appropriate isolation precautions for identified infection/condition  Outcome: Progressing  Goal: Absence of fever/infection during neutropenic period  Description: INTERVENTIONS:  - Monitor WBC    Outcome: Progressing     Problem: SAFETY ADULT  Goal: Maintain or return to baseline ADL function  Description: INTERVENTIONS:  -  Assess patient's ability to carry out ADLs; assess patient's baseline for ADL function and identify physical deficits which impact ability to perform ADLs (bathing, care of mouth/teeth, toileting, grooming, dressing, etc )  - Assess/evaluate cause of self-care deficits   - Assess range of motion  - Assess patient's mobility; develop plan if impaired  - Assess patient's need for assistive devices and provide as appropriate  - Encourage maximum independence but intervene and supervise when necessary  - Involve family in performance of ADLs  - Assess for home care needs following discharge   - Consider OT consult to assist with ADL evaluation and planning for discharge  - Provide patient education as appropriate  Outcome: Progressing  Goal: Maintains/Returns to pre admission functional level  Description: INTERVENTIONS:  - Perform BMAT or MOVE assessment daily    - Set and communicate daily mobility goal to care team and patient/family/caregiver  - Collaborate with rehabilitation services on mobility goals if consulted  - Perform Range of Motion 3 times a day  - Reposition patient every 3 hours    - Dangle patient 3 times a day  - Stand patient 3 times a day  - Ambulate patient 3 times a day  - Out of bed to chair 3 times a day   - Out of bed for meals 3 times a day  - Out of bed for toileting  - Record patient progress and toleration of activity level   Outcome: Progressing  Goal: Patient will remain free of falls  Description: INTERVENTIONS:  - Educate patient/family on patient safety including physical limitations  - Instruct patient to call for assistance with activity   - Consult OT/PT to assist with strengthening/mobility   - Keep Call bell within reach  - Keep bed low and locked with side rails adjusted as appropriate  - Keep care items and personal belongings within reach  - Initiate and maintain comfort rounds  - Make Fall Risk Sign visible to staff  - Offer Toileting every 3 Hours, in advance of need  - Initiate/Maintain 3alarm  - Obtain necessary fall risk management equipment: 3  - Apply yellow socks and bracelet for high fall risk patients  - Consider moving patient to room near nurses station  Outcome: Progressing     Problem: DISCHARGE PLANNING  Goal: Discharge to home or other facility with appropriate resources  Description: INTERVENTIONS:  - Identify barriers to discharge w/patient and caregiver  - Arrange for needed discharge resources and transportation as appropriate  - Identify discharge learning needs (meds, wound care, etc )  - Arrange for interpretive services to assist at discharge as needed  - Refer to Case Management Department for coordinating discharge planning if the patient needs post-hospital services based on physician/advanced practitioner order or complex needs related to functional status, cognitive ability, or social support system  Outcome: Progressing     Problem: Knowledge Deficit  Goal: Patient/family/caregiver demonstrates understanding of disease process, treatment plan, medications, and discharge instructions  Description: Complete learning assessment and assess knowledge base    Interventions:  - Provide teaching at level of understanding  - Provide teaching via preferred learning methods  Outcome: Progressing     Problem: Potential for Falls  Goal: Patient will remain free of falls  Description: INTERVENTIONS:  - Educate patient/family on patient safety including physical limitations  - Instruct patient to call for assistance with activity   - Consult OT/PT to assist with strengthening/mobility   - Keep Call bell within reach  - Keep bed low and locked with side rails adjusted as appropriate  - Keep care items and personal belongings within reach  - Initiate and maintain comfort rounds  - Make Fall Risk Sign visible to staff  - Offer Toileting every 3 Hours, in advance of need  - Initiate/Maintain 3alarm  - Obtain necessary fall risk management equipment: 3  - Apply yellow socks and bracelet for high fall risk patients  - Consider moving patient to room near nurses station  Outcome: Progressing

## 2022-07-31 NOTE — PROGRESS NOTES
[facility]  Progress Note - Kee Nicole 1955, 75 y o  male MRN: [id number]  [facility] Encounter: [id number]  Primary Care Provider: Irving Luis DO   Date and time admitted to hospital: 7/29/2022  5:58 AM    * Dyspnea  Assessment & Plan  · Patient with progressive dyspnea on exertion for the past 3 weeks prior to admission  · Likely multifactorial in the setting of anemia and PE  · Plan as per below     Hypoxia  Assessment & Plan  · Likely multifactorial in the setting of anemia and PE  · CXR x2 with no acute cardiopulmonary disease   · Echo pending  · Patient on high flow  · Pulm consulted as below     Malignant melanoma of leg, right (HCC)  Assessment & Plan  · Status post Excision and sentinel lymph node biopsy 7/29/22  · Surgical Oncology is following      Autoimmune hemolytic anemia  Assessment & Plan  · Follows with Hematology, Dr Lizette Ferguson, as outpatient  · Heme/Onc following here, I discussed with them today and appreciate their ongoing recommendations   · Prednisone dose was decreased during admission from 60mg to 40mg per Heme/Onc recs  · Hemoglobin 6 4 on admission  · With warm and cold antibody  · LDH elevated at 1,077  · Sherry test positive  · Retic count elevated  · Received 1 unit least incompatible blood transfusion on 7/30/22  · Heme/Onc now recommending to hold off on rituximab for now    Pulmonary embolism with acute cor pulmonale (HCC)  Assessment & Plan  · History of DVT/PE, Portal vein thrombosis, maintained on Pradaxa as outpatient - per home med/rec in the computer and OP Heme/Onc note, patient on 75mg Pradax BID - unclear why on this dosing  ·  Both Surg/Onc and Heme/Onc cleared to resume Pradaxa 7/30/22  · However, overnight patient was noted to have increasing oxygen requirements  · Pradaxa had been on hold for 2 days for surgical incision per patient   He was on SC heparin for DVT ppx  · Patient reports history of Xarelto failure in the past  · CTA chest PE study revealed RML PE with RRV:LV >0 9 concerning for right ventricular strain  · Echo pending  · Patient on high flow oxygen  · Pulm consulted  · On heparin GTT - I discussed with Heme/Onc today who recommended to continue the heparin GTT for a few days and then transition back to Pradaxa but at full dose (150mg BID)  · Per my discussion with Heme/Onc, unclear timing of PE as patient presented with dyspnea for weeks but also had worsening anemia and his Pradaxa was held pre-op    Elevated bilirubin  Assessment & Plan  · Total bilirubin 9 44  · Direct bilirubin 0 77  · With hemolytic anemia as above    Elevated serum creatinine  Assessment & Plan  · Creatinine 1 37 today, baseline appears to be around 1 2-1 3  · Monitor  · Avoid nephrotoxins and hypotension     Essential hypertension  Assessment & Plan  · Acceptable BP  · Continue Toprol XL and HCTZ    Gastroesophageal reflux disease  Assessment & Plan  · Continue PPI    Leukocytosis  Assessment & Plan  · Patient has chronic leukocytosis  · With chronic steroid use, recent surgery, history of splenectomy, and PE         VTE Pharmacologic Prophylaxis:   heparin GTT    Patient Centered Rounds: I performed bedside rounds with nursing staff today  d/w RN Enedina Wilhelm  Discussions with Specialists or Other Care Team Provider: Dr Yosvany Wyatt attending, Dr Anu Gracia, and TT'd Pulm Fellow     Education and Discussions with Family / Patient: Updated  (wife) via phone  Time Spent for Care: 45 minutes  More than 50% of total time spent on counseling and coordination of care as described above  Current Length of Stay: 2 day(s)  Current Patient Status: Inpatient   Certification Statement: The patient will continue to require additional inpatient hospital stay due to PE, high flow oxygen  Discharge Plan: Anticipate discharge in >72 hrs to home      Code Status: Level 1 - Full Code    Subjective:   Mr Torito Reyes reports feeling better today  He reports having a rough night  He reports that he was feeling SOB for 3-4 weeks PTA  He denies any known bleeding  He denies CP  Objective:     Vitals:   Temp (24hrs), Av 1 °F (36 7 °C), Min:97 4 °F (36 3 °C), Max:98 5 °F (36 9 °C)    Temp:  [97 4 °F (36 3 °C)-98 5 °F (36 9 °C)] 98 2 °F (36 8 °C)  HR:  [] 113  Resp:  [18-20] 20  BP: (136-165)/(80-93) 165/91  SpO2:  [87 %-96 %] 91 %  Body mass index is 30 07 kg/m²  Input and Output Summary (last 24 hours): Intake/Output Summary (Last 24 hours) at 2022 1000  Last data filed at 2022 0801  Gross per 24 hour   Intake 1771 5 ml   Output 1600 ml   Net 171 5 ml       Physical Exam:   Physical Exam  Vitals and nursing note reviewed  Constitutional:       Comments: Patient seen sitting in bed, NAD  Patient seen with and case/plan discussed with Dr Jared Chambers   Cardiovascular:      Rate and Rhythm: Regular rhythm  Tachycardia present  Pulmonary:      Effort: Pulmonary effort is normal  No respiratory distress  Breath sounds: Normal breath sounds  Comments: 55L high flow  Abdominal:      General: Bowel sounds are normal  There is no distension  Palpations: Abdomen is soft  Musculoskeletal:      Right lower leg: No edema  Left lower leg: No edema  Skin:     Comments: Excision dressing CDI   Neurological:      Mental Status: He is alert and oriented to person, place, and time     Psychiatric:         Mood and Affect: Mood normal          Behavior: Behavior normal           Additional Data:     Labs:  Results from last 7 days   Lab Units 22  0252 22  0521 22  0703 22  0943   WBC Thousand/uL 24 26* 19 20*   < > 22 62*   HEMOGLOBIN g/dL 8 0* 7 2*   < > 7 7*   HEMATOCRIT % 22 3* 21 5*   < > 24 0*   PLATELETS Thousands/uL 271 290   < > 498*   BANDS PCT %  --   --   --  3   LYMPHO PCT %  --  13*  --  11*   MONO PCT %  --  5  --  8   EOS PCT %  --  0  --  2    < > = values in this interval not displayed       Results from last 7 days   Lab Units 07/31/22  0519   SODIUM mmol/L 140   POTASSIUM mmol/L 3 8   CHLORIDE mmol/L 105   CO2 mmol/L 28   BUN mg/dL 29*   CREATININE mg/dL 1 37*   ANION GAP mmol/L 7   CALCIUM mg/dL 8 8   ALBUMIN g/dL 3 5   TOTAL BILIRUBIN mg/dL 9 44*   ALK PHOS U/L 93   ALT U/L 41   AST U/L 66*   GLUCOSE RANDOM mg/dL 165*     Results from last 7 days   Lab Units 07/31/22  0252   INR  1 02                   Lines/Drains:  Invasive Devices  Report    Peripheral Intravenous Line  Duration           Peripheral IV 07/29/22 Left Arm 2 days    Peripheral IV 07/31/22 Left;Ventral (anterior) Forearm <1 day                      Imaging: Reviewed radiology reports from this admission including: chest xray and chest CT scan    Recent Cultures (last 7 days):         Last 24 Hours Medication List:   Current Facility-Administered Medications   Medication Dose Route Frequency Provider Last Rate    acetaminophen  975 mg Oral [facility] Patrica Hector PA-C      docusate sodium  100 mg Oral BID Do Yu MD      heparin (porcine)  3-30 Units/kg/hr (Order-Specific) Intravenous Titrated Gregorio Otto, CRNP 18 Units/kg/hr (07/31/22 0301)    heparin (porcine)  3,400 Units Intravenous Q1H PRN Gregorio Otto, CRNP      heparin (porcine)  6,800 Units Intravenous Q1H PRN Gregorio Otto, CRNP      hydrochlorothiazide  25 mg Oral QAM Arely Hector PA-C      metoprolol succinate  12 5 mg Oral QAM Arely Hector PA-C      ondansetron  4 mg Intravenous Q4H PRN Karen Mills PA-C      oxyCODONE  5 mg Oral Q4H PRN Arely Hector PA-C      pantoprazole  40 mg Oral Early Morning Arely Hector PA-C      polyethylene glycol  17 g Oral Daily Do Yu MD      potassium chloride  20 mEq Oral QAM Arely Hector PA-C      predniSONE  40 mg Oral Daily Melani Orr PA-C      traMADol  50 mg Oral Q6H [facility] Arely Botello Elmer Leach PA-C          Today, Patient Was Seen By: Bharti Mcneil PA-C    **Please Note: This note may have been constructed using a voice recognition system  **

## 2022-07-31 NOTE — CONSULTS
e-Consult (IPC)  - Interventional Radiology  Livier Peralta 75 y o  male MRN: [id number]  Unit/Bed#: [facility] 910-01 Encounter: [id number]          Interventional Radiology has been consulted to evaluate Livier Peralta    We were consulted by Alvarado Gtz concerning this patient with pulmonary embolism  IP Consult to IR  Consult performed by: Jam Palencia MD  Consult ordered by: Alvarado Gtz PA-C        07/31/22    Assessment/Recommendation:   76 yo male s/p resection of melanoma from right leg with h/o DVT/PE, OV thrombosis on Pradaxa as an outpatient experienced SOB and hypoxia  CTA chest showed bilateral PE  The images were reviewed showing the emboli are primarily subsegmental with no central clot  Based on the location of the clot, the patient is not a candidate for endovascular therapy  The consult was discussed with Alvarado Gtz over TigerText  Please reach out to IR with any additional questions  Total time spent in review of data, discussion with requesting provider and rendering advice was 15 minutes  Thank you for allowing Interventional Radiology to participate in the care of Livier Peralta  Please don't hesitate to call or TigerText us with any questions       Jam Palencia MD

## 2022-07-31 NOTE — ASSESSMENT & PLAN NOTE
· History of DVT/PE, Portal vein thrombosis, maintained on Pradaxa as outpatient - per home med/rec in the computer and OP Heme/Onc note, patient on 75mg Pradax BID - unclear why on this dosing  ·  Both Surg/Onc and Heme/Onc cleared to resume Pradaxa 7/30/22  · However, overnight patient was noted to have increasing oxygen requirements  · Pradaxa had been on hold for 2 days for surgical incision per patient   He was on SC heparin for DVT ppx  · Patient reports history of Xarelto failure in the past  · CTA chest PE study revealed RML PE with RRV:LV >0 9 concerning for right ventricular strain  · Echo pending  · Patient on high flow oxygen  · Pulm consulted  · On heparin GTT - I discussed with Heme/Onc today who recommended to continue the heparin GTT for a few days and then transition back to Pradaxa but at full dose (150mg BID)  · Per my discussion with Heme/Onc, unclear timing of PE as patient presented with dyspnea for weeks but also had worsening anemia and his Pradaxa was held pre-op

## 2022-07-31 NOTE — CONSULTS
PULMONOLOGY CONSULT NOTE     Name: Rickey Alexander   Age & Sex: 75 y o  male   MRN: [id number]  Unit/Bed#: [facility] 910-01   Encounter: [id number]        Reason for consultation: Acute respiratory failure with hypoxia secondary to PE    Requesting physician: Cinderella Cogan, DO     Assessment:   1  Acute hypoxic respiratory failure  · Not on home O2  · Reports LOWE for several weeks prior to admission  · Currently on HFNC 55L/60%  · Likely multifactorial with a component of anemia and now acute PE  2  Acute subsegmental PE in RML  · Evidenced in CTA PE study  · IR consulted  No acute intervention is possible on their end due to subsegmental PE  · Elevated troponin and NT-proBNP  · TTE pending    3  History of PE/DVT and Xarelto failure   · On Pradaxa as outpatient  · Hematology/oncology following    4  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  5  Elevated creatinine  6  HTN  7  GERD  8  Leukocytosis    Plan:   Wean FiO2 as tolerated to maintain SpO2>88%   If FiO2>70%, patient will have to be upgraded to SD1   Continue to monitor VS closely  If hemodynamically unstable, he might require tPA   TTE pending  F/U on results   Bilateral LE doppler ordered and pending   Continue heparin gtt   Rest of plan per primary care  History of Present Illness   HPI:  Rickey Alexander is a 75 y o  male with a PMHx of GERD, Prior PE/DVT/Portal vein thrombosis on Pradaxa, autoimmune hemolytic anemia s/p IVIG, rituximab, and splenectomy, on chronic prednisone, who came to [facility] on 07/29 to undergo excision of malignant melanoma of the right leg  He was admitted to the surgical service post-op due to anemia found on pre-op labs  Of note, Pradaxa was held for 48 hours prior to surgery  Hematology/oncology consulted on admission and patient received RBC transfusions  Additionally, they recommended to wean the steroids from 60 to 40 mg   Internal Medicine consulted due to dyspnea which is chronic for more than 4 weeks, but recently worsening  Last night, patient was a DI alert due to hypoxic respiratory failure requiring HFNC  CTA PE study was ordered with evidence of acute PE in the RML with RV/LV ratio >0 9 concerning for right heart strain  Patient was started on Heparin gtt and Pulmonology consulted for the CT findings  Today, patient states he feels well  He reports feeling short of breath for over 4 weeks  Denies any fever or chills  Lab workuop significant for elevated troponin at 489, NTproBNP 2757  WBC 24, hg 8 (after transfusion)  Creatinine 1 37 (decreased from 1 55)  TTE is pending  Patient is on HFNC 55L/60%  Review of systems:  12 point review of systems was completed and was otherwise negative except as listed in HPI        Historical Information   Past Medical History:   Diagnosis Date    Anemia 11/2/2016    Anxiety     Arthritis     Autoimmune hemolytic anemia ([address] )     Claustrophobia     COVID 12/2020    DVT (deep venous thrombosis) (HCC)     GERD (gastroesophageal reflux disease)     Hearing loss, right     Hemolytic anemia ([address] )     History of transfusion     2018 - no adverse reaction    Hypertension     Malignant melanoma of leg, right ([address] ) 7/1/2022    Palpitation     Portal vein thrombosis     PTSD (post-traumatic stress disorder)     Pulmonary emboli (HCC)     Tobacco abuse      Past Surgical History:   Procedure Laterality Date    CATARACT EXTRACTION Bilateral     CHOLECYSTECTOMY  7/18/2017    COLONOSCOPY      ELBOW ARTHROPLASTY Left     bursectomy    JOINT REPLACEMENT Right 2/2/2021    knee    KNEE SURGERY Right     meniscus tear    RI LAP,CHOLECYSTECTOMY/GRAPH N/A 12/23/2017    Procedure: CHOLECYSTECTOMY LAPAROSCOPIC with cholangiogram;  Surgeon: Rebecca Aponte MD;  [facility];  Service: General    RI REMOVAL SPLEEN, TOTAL N/A 5/18/2017    Procedure: LAPAROSCOPIC HAND ASSIST SPLENECTOMY;  Surgeon: Merline Hensley MD;  [facility];  Service: Surgical Oncology    RI TOTAL KNEE ARTHROPLASTY Right 2/2/2021    Procedure: ARTHROPLASTY KNEE TOTAL;  Surgeon: Alex Khoury MD;  [facility];  Service: Orthopedics    RI TOTAL KNEE ARTHROPLASTY Left 11/17/2021    Procedure: TOTAL KNEE REPLACEMENT;  Surgeon: Alex Khoury MD;  [facility];  Service: Orthopedics    SHOULDER SURGERY Left     rotator cuff x4, reconstruction     Family History   Problem Relation Age of Onset   Alma Ellsworth Cancer Mother     Breast cancer Mother     Other Father         CABG    Heart attack Paternal Grandfather         acute MI       Social History    Social History:   Social History     Socioeconomic History    Marital status: /Civil Union     Spouse name: Not on file    Number of children: Not on file    Years of education: Not on file    Highest education level: Not on file   Occupational History    Not on file   Tobacco Use    Smoking status: Current Some Day Smoker     Packs/day: 0 00     Years: 5 00     Pack years: 0 00     Types: Cigars    Smokeless tobacco: Current User     Types: Snuff    Tobacco comment: 5  a week average   Vaping Use    Vaping Use: Never used   Substance and Sexual Activity    Alcohol use:  Yes     Alcohol/week: 6 0 standard drinks     Types: 6 Cans of beer per week     Comment: socially    Drug use: Yes     Frequency: 3 0 times per week     Types: Marijuana     Comment: medical marijuana    Sexual activity: Yes     Partners: Female     Birth control/protection: None   Other Topics Concern    Not on file   Social History Narrative    Daily coffee consumption (2 cups/day)    reitred     Social Determinants of Health     Financial Resource Strain: Not on file   Food Insecurity: Not on file   Transportation Needs: Not on file   Physical Activity: Not on file   Stress: Not on file   Social Connections: Not on file   Intimate Partner Violence: Not on file   Housing Stability: Not on file         Meds/Allergies   Current Facility-Administered Medications   Medication Dose Route Frequency    acetaminophen (TYLENOL) tablet 975 mg  975 mg Oral Q8H [address]    docusate sodium (COLACE) capsule 100 mg  100 mg Oral BID    heparin (porcine) 25,000 units in 0 45% NaCl 250 mL infusion (premix)  3-30 Units/kg/hr (Order-Specific) Intravenous Titrated    heparin (porcine) injection 3,400 Units  3,400 Units Intravenous Q1H PRN    heparin (porcine) injection 6,800 Units  6,800 Units Intravenous Q1H PRN    hydrochlorothiazide (HYDRODIURIL) tablet 25 mg  25 mg Oral QAM    metoprolol succinate (TOPROL-XL) 24 hr tablet 12 5 mg  12 5 mg Oral QAM    ondansetron (ZOFRAN) injection 4 mg  4 mg Intravenous Q4H PRN    oxyCODONE (ROXICODONE) IR tablet 5 mg  5 mg Oral Q4H PRN    pantoprazole (PROTONIX) EC tablet 40 mg  40 mg Oral Early Morning    polyethylene glycol (MIRALAX) packet 17 g  17 g Oral Daily    potassium chloride (K-DUR,KLOR-CON) CR tablet 20 mEq  20 mEq Oral QAM    predniSONE tablet 40 mg  40 mg Oral Daily    traMADol (ULTRAM) tablet 50 mg  50 mg Oral Q6H RAVIN     Medications Prior to Admission   Medication    acetaminophen (TYLENOL) 325 mg tablet    ascorbic acid (VITAMIN C) 1000 MG tablet    dabigatran etexilate (PRADAXA) 75 mg capsule    hydrochlorothiazide (HYDRODIURIL) 25 mg tablet    metoprolol succinate (TOPROL-XL) 25 mg 24 hr tablet    pantoprazole (PROTONIX) 40 mg tablet    potassium chloride (K-DUR,KLOR-CON) 20 mEq tablet    prazosin (MINIPRESS) 1 mg capsule    predniSONE 20 mg tablet    VITAMIN D PO    traMADol (Ultram) 50 mg tablet     No Known Allergies    Objective    Vitals: Blood pressure 165/91, pulse (!) 113, temperature 98 2 °F (36 8 °C), resp  rate 20, height 5' 7" (1 702 m), weight 87 1 kg (192 lb), SpO2 91 % , HFNC, Body mass index is 30 07 kg/m²        Intake/Output Summary (Last 24 hours) at 7/31/2022 0932  Last data filed at 7/31/2022 0801  Gross per 24 hour   Intake 1771 5 ml   Output 1600 ml   Net 171 5 ml       Physical Exam  Vitals and nursing note reviewed  Constitutional:       Appearance: He is not toxic-appearing  HENT:      Head: Normocephalic  Eyes:      General: No scleral icterus  Pupils: Pupils are equal, round, and reactive to light  Cardiovascular:      Rate and Rhythm: Regular rhythm  Tachycardia present  Heart sounds: No murmur heard  No gallop  Pulmonary:      Effort: Pulmonary effort is normal       Breath sounds: No wheezing, rhonchi or rales  Abdominal:      General: Bowel sounds are normal  There is no distension  Palpations: Abdomen is soft  Tenderness: There is no abdominal tenderness  There is no guarding  Musculoskeletal:      Right lower leg: No edema  Left lower leg: No edema  Skin:     General: Skin is warm  Capillary Refill: Capillary refill takes less than 2 seconds  Neurological:      General: No focal deficit present  Mental Status: He is alert and oriented to person, place, and time  Cranial Nerves: No cranial nerve deficit  Psychiatric:         Mood and Affect: Mood normal            Labs: I have personally reviewed pertinent lab results    Laboratory and Diagnostics  Results from last 7 days   Lab Units 07/31/22  0252 07/30/22  0521 07/29/22  1856 07/29/22  0703 07/27/22  0943   WBC Thousand/uL 24 26* 19 20*  --  21 47* 22 62*   HEMOGLOBIN g/dL 8 0* 7 2*  --  6 4* 7 7*   HEMATOCRIT % 22 3* 21 5*  --  18 8* 24 0*   PLATELETS Thousands/uL 271 290 324 381 498*   BANDS PCT %  --   --   --   --  3   MONO PCT %  --  5  --   --  8     Results from last 7 days   Lab Units 07/31/22  0519 07/30/22  0521   SODIUM mmol/L 140 139   POTASSIUM mmol/L 3 8 3 6   CHLORIDE mmol/L 105 105   CO2 mmol/L 28 27   ANION GAP mmol/L 7 7   BUN mg/dL 29* 33*   CREATININE mg/dL 1 37* 1 55*   CALCIUM mg/dL 8 8 9 2   GLUCOSE RANDOM mg/dL 165* 105   ALT U/L 41 30   AST U/L 66* 46*   ALK PHOS U/L 93 79   ALBUMIN g/dL 3 5 3 7   TOTAL BILIRUBIN mg/dL 9 44* 7 05*     Results from last 7 days   Lab Units 07/30/22  0521   MAGNESIUM mg/dL 2 4   PHOSPHORUS mg/dL 3 5      Results from last 7 days   Lab Units 07/31/22  0252   INR  1 02   PTT seconds 21*                  Results from last 7 days   Lab Units 07/30/22  0521   LD U/L 1,077*                   ABG:       Micro:        Imaging and other studies: I have personally reviewed pertinent reports  CTA PE study with evidence of acute PE in RML  Pulmonary function testing: Not available    EKG, Pathology, and Other Studies: I have personally reviewed pertinent reports  TTE pending       Code Status: Level 1 - Full Code    VTE Pharmacologic Prophylaxis: Heparin  VTE Mechanical Prophylaxis: sequential compression device    Disclaimer: Portions of the record may have been created with voice recognition software  Occasional wrong word or "sound a like" substitutions may have occurred due to the inherent limitations of voice recognition software  Careful consideration should be taken to recognize, using context, where substitutions have occurred      Cherelle Lopez MD   Pulmonary/Critical Care Fellowship PGY-4  [facility]

## 2022-07-31 NOTE — PLAN OF CARE

## 2022-07-31 NOTE — QUICK NOTE
Called by nursing due to increasing oxygen requirement  Patient is currently on 10 L nasal cannula  Was saturating well with 8 L nasal cannula before  Patient received IV fluids and also blood transfusion    Will give 1 time dose of Lasix and also obtain a chest x-ray

## 2022-08-01 ENCOUNTER — APPOINTMENT (INPATIENT)
Dept: NON INVASIVE DIAGNOSTICS | Facility: HOSPITAL | Age: 67
DRG: 570 | End: 2022-08-01
Payer: MEDICARE

## 2022-08-01 LAB
ABO GROUP BLD: NORMAL
ALBUMIN SERPL BCP-MCNC: 3.1 G/DL (ref 3.5–5)
ALP SERPL-CCNC: 78 U/L (ref 46–116)
ALT SERPL W P-5'-P-CCNC: 47 U/L (ref 12–78)
ANION GAP SERPL CALCULATED.3IONS-SCNC: 5 MMOL/L (ref 4–13)
APTT PPP: 52 SECONDS (ref 23–37)
APTT PPP: 59 SECONDS (ref 23–37)
APTT PPP: 85 SECONDS (ref 23–37)
AST SERPL W P-5'-P-CCNC: 86 U/L (ref 5–45)
BILIRUB SERPL-MCNC: 8.08 MG/DL (ref 0.2–1)
BLD GP AB SCN SERPL QL: POSITIVE
BUN SERPL-MCNC: 37 MG/DL (ref 5–25)
CALCIUM ALBUM COR SERPL-MCNC: 9.3 MG/DL (ref 8.3–10.1)
CALCIUM SERPL-MCNC: 8.6 MG/DL (ref 8.3–10.1)
CHLORIDE SERPL-SCNC: 107 MMOL/L (ref 96–108)
CO2 SERPL-SCNC: 29 MMOL/L (ref 21–32)
CREAT SERPL-MCNC: 1.36 MG/DL (ref 0.6–1.3)
ERYTHROCYTE [DISTWIDTH] IN BLOOD BY AUTOMATED COUNT: 29.1 % (ref 11.6–15.1)
GFR SERPL CREATININE-BSD FRML MDRD: 53 ML/MIN/1.73SQ M
GLUCOSE SERPL-MCNC: 112 MG/DL (ref 65–140)
HCT VFR BLD AUTO: 18 % (ref 36.5–49.3)
HGB BLD-MCNC: 6.2 G/DL (ref 12–17)
MCH RBC QN AUTO: 54.4 PG (ref 26.8–34.3)
MCHC RBC AUTO-ENTMCNC: 34.4 G/DL (ref 31.4–37.4)
MCV RBC AUTO: 158 FL (ref 82–98)
PLATELET # BLD AUTO: 252 THOUSANDS/UL (ref 149–390)
PMV BLD AUTO: 10.4 FL (ref 8.9–12.7)
POTASSIUM SERPL-SCNC: 3.3 MMOL/L (ref 3.5–5.3)
PROT SERPL-MCNC: 5.4 G/DL (ref 6.4–8.4)
RBC # BLD AUTO: 1.14 MILLION/UL (ref 3.88–5.62)
RH BLD: POSITIVE
SODIUM SERPL-SCNC: 141 MMOL/L (ref 135–147)
SPECIMEN EXPIRATION DATE: NORMAL
WBC # BLD AUTO: 25.63 THOUSAND/UL (ref 4.31–10.16)

## 2022-08-01 PROCEDURE — 86921 COMPATIBILITY TEST INCUBATE: CPT

## 2022-08-01 PROCEDURE — 99232 SBSQ HOSP IP/OBS MODERATE 35: CPT | Performed by: INTERNAL MEDICINE

## 2022-08-01 PROCEDURE — 86922 COMPATIBILITY TEST ANTIGLOB: CPT

## 2022-08-01 PROCEDURE — 85730 THROMBOPLASTIN TIME PARTIAL: CPT | Performed by: INTERNAL MEDICINE

## 2022-08-01 PROCEDURE — 85730 THROMBOPLASTIN TIME PARTIAL: CPT | Performed by: PHYSICIAN ASSISTANT

## 2022-08-01 PROCEDURE — 86900 BLOOD TYPING SEROLOGIC ABO: CPT | Performed by: NURSE PRACTITIONER

## 2022-08-01 PROCEDURE — 86901 BLOOD TYPING SEROLOGIC RH(D): CPT | Performed by: NURSE PRACTITIONER

## 2022-08-01 PROCEDURE — P9058 RBC, L/R, CMV-NEG, IRRAD: HCPCS

## 2022-08-01 PROCEDURE — 85027 COMPLETE CBC AUTOMATED: CPT | Performed by: PHYSICIAN ASSISTANT

## 2022-08-01 PROCEDURE — 99232 SBSQ HOSP IP/OBS MODERATE 35: CPT | Performed by: PHYSICIAN ASSISTANT

## 2022-08-01 PROCEDURE — 80053 COMPREHEN METABOLIC PANEL: CPT | Performed by: PHYSICIAN ASSISTANT

## 2022-08-01 PROCEDURE — 86850 RBC ANTIBODY SCREEN: CPT | Performed by: NURSE PRACTITIONER

## 2022-08-01 PROCEDURE — 93970 EXTREMITY STUDY: CPT | Performed by: SURGERY

## 2022-08-01 PROCEDURE — 93970 EXTREMITY STUDY: CPT

## 2022-08-01 PROCEDURE — 94760 N-INVAS EAR/PLS OXIMETRY 1: CPT

## 2022-08-01 PROCEDURE — 99024 POSTOP FOLLOW-UP VISIT: CPT | Performed by: STUDENT IN AN ORGANIZED HEALTH CARE EDUCATION/TRAINING PROGRAM

## 2022-08-01 RX ORDER — SODIUM CHLORIDE 9 MG/ML
20 INJECTION, SOLUTION INTRAVENOUS ONCE
Status: COMPLETED | OUTPATIENT
Start: 2022-08-01 | End: 2022-08-01

## 2022-08-01 RX ORDER — GINSENG 100 MG
1 CAPSULE ORAL DAILY
Status: DISCONTINUED | OUTPATIENT
Start: 2022-08-01 | End: 2022-08-05 | Stop reason: HOSPADM

## 2022-08-01 RX ORDER — ACETAMINOPHEN 325 MG/1
650 TABLET ORAL ONCE
Status: COMPLETED | OUTPATIENT
Start: 2022-08-01 | End: 2022-08-01

## 2022-08-01 RX ADMIN — BACITRACIN ZINC 1 SMALL APPLICATION: 500 OINTMENT TOPICAL at 10:19

## 2022-08-01 RX ADMIN — DIPHENHYDRAMINE HYDROCHLORIDE 25 MG: 50 INJECTION, SOLUTION INTRAMUSCULAR; INTRAVENOUS at 15:00

## 2022-08-01 RX ADMIN — METOPROLOL SUCCINATE 12.5 MG: 25 TABLET, EXTENDED RELEASE ORAL at 07:58

## 2022-08-01 RX ADMIN — SODIUM CHLORIDE 20 ML/HR: 0.9 INJECTION, SOLUTION INTRAVENOUS at 13:33

## 2022-08-01 RX ADMIN — POTASSIUM CHLORIDE 20 MEQ: 1500 TABLET, EXTENDED RELEASE ORAL at 07:58

## 2022-08-01 RX ADMIN — HEPARIN SODIUM 20 UNITS/KG/HR: 10000 INJECTION, SOLUTION INTRAVENOUS at 13:46

## 2022-08-01 RX ADMIN — POLYETHYLENE GLYCOL 3350 17 G: 17 POWDER, FOR SOLUTION ORAL at 07:58

## 2022-08-01 RX ADMIN — HEPARIN SODIUM 3400 UNITS: 1000 INJECTION INTRAVENOUS; SUBCUTANEOUS at 15:21

## 2022-08-01 RX ADMIN — DOCUSATE SODIUM 100 MG: 100 CAPSULE, LIQUID FILLED ORAL at 17:16

## 2022-08-01 RX ADMIN — ACETAMINOPHEN 650 MG: 325 TABLET ORAL at 13:32

## 2022-08-01 RX ADMIN — RITUXIMAB 746.2 MG: 10 INJECTION, SOLUTION INTRAVENOUS at 16:44

## 2022-08-01 RX ADMIN — PREDNISONE 40 MG: 20 TABLET ORAL at 07:58

## 2022-08-01 RX ADMIN — DOCUSATE SODIUM 100 MG: 100 CAPSULE, LIQUID FILLED ORAL at 07:58

## 2022-08-01 RX ADMIN — PANTOPRAZOLE SODIUM 40 MG: 40 TABLET, DELAYED RELEASE ORAL at 05:29

## 2022-08-01 RX ADMIN — HYDROCHLOROTHIAZIDE 25 MG: 25 TABLET ORAL at 07:58

## 2022-08-01 RX ADMIN — HEPARIN SODIUM 3400 UNITS: 1000 INJECTION INTRAVENOUS; SUBCUTANEOUS at 07:02

## 2022-08-01 NOTE — PROGRESS NOTES
[facility]  Progress Note - Roly Batter 1955, 75 y o  male MRN: [id number]  Unit/Bed#: [facility] 910-01 Encounter: [id number]  Primary Care Provider: Rommel Maldonado DO   Date and time admitted to hospital: 7/29/2022  5:58 AM    * Dyspnea  Assessment & Plan  · Patient with progressive dyspnea on exertion for the past 3 weeks prior to admission  · Likely multifactorial in the setting of anemia and PE  · Plan as per below     Hypoxia  Assessment & Plan  · Likely multifactorial in the setting of anemia and PE  · CXR x 2 with no acute cardiopulmonary disease   · Echo revealed LVEF 70%, right ventricular systolic function mildly reduced, moderate to severe TVR  · Patient on high flow, wean as able  · Pulm following as below     Malignant melanoma of leg, right (HCC)  Assessment & Plan  · Status post Excision and sentinel lymph node biopsy 7/29/22  · Surgical Oncology is following      Autoimmune hemolytic anemia  Assessment & Plan  · Follows with Hematology, Dr Maynor Quintero, as outpatient  · Heme/Onc following here, I discussed with them today and appreciate their ongoing recommendations   · Prednisone dose was decreased during admission from 60mg to 40mg per Heme/Onc recs  · With warm and cold antibody  · LDH elevated at 1,077  · Sherry test positive  · Retic count elevated  · Received 1 unit least incompatible blood transfusion on 7/30/22   Hemoglobin 6 2 this AM, additional 1 unit PRBCs ordered by Heme/Onc today  · Heme/Onc recommending to hold off on rituximab for now    Pulmonary embolism with acute cor pulmonale (HCC)  Assessment & Plan  · History of DVT/PE, Portal vein thrombosis, maintained on Pradaxa as outpatient - per home med/rec in the computer and OP Heme/Onc note, patient on 75mg Pradax BID - unclear why on this dosing  ·  Both Surg/Onc and Heme/Onc cleared to resume Pradaxa 7/30/22  · However, patient was noted to have increasing oxygen requirements  · Pradaxa had been on hold for 2 days for surgical incision per patient  He was on SC heparin for DVT ppx  · Patient reports history of Xarelto failure in the past  · CTA chest PE study revealed RML PE with RRV:LV >0 9 concerning for right ventricular strain  · Echo revealed LVEF 70%, right ventricular systolic function mildy reduced, moderate-severe TVR  · Patient on high flow oxygen, wean as able  · Pulm following   · On heparin GTT  Heme/Onc today who recommended to continue the heparin GTT for a few days and then transition back to Pradaxa but at full dose (150mg BID)  · Per my discussion with Heme/Onc, unclear timing of PE as patient presented with dyspnea for weeks but also had worsening anemia and his Pradaxa was held pre-op  · Less likely Pradaxa failure per Pulm given Pradaxa was on hold for 48 hours prior to surgery  · Not a candidate for endovascular therapy per IR    Elevated bilirubin  Assessment & Plan  · Total bilirubin 8 08  · Direct bilirubin 0 77  · With hemolytic anemia as above    Elevated serum creatinine  Assessment & Plan  · Creatinine 1 36 today, baseline appears to be around 1 2-1 3  · Monitor  · Avoid nephrotoxins and hypotension     Essential hypertension  Assessment & Plan  · Acceptable BP  · Continue Toprol XL and HCTZ    Gastroesophageal reflux disease  Assessment & Plan  · Continue PPI    Leukocytosis  Assessment & Plan  · Patient has chronic leukocytosis  · With chronic steroid use, recent surgery, history of splenectomy, and PE         VTE Pharmacologic Prophylaxis:   heparin GTT    Patient Centered Rounds: I performed bedside rounds with nursing staff today  d/w RN at bedside   Discussions with Specialists or Other Care Team Provider: Dr Daniela Mitchell and Heme/Onc AP, blood bank     Education and Discussions with Family / Patient: Updated  (wife) via phone  Time Spent for Care: 30 minutes   More than 50% of total time spent on counseling and coordination of care as described above     Current Length of Stay: 3 day(s)  Current Patient Status: Inpatient   Certification Statement: The patient will continue to require additional inpatient hospital stay due to heparin GTT, blood transfusion, high flow  Discharge Plan: Anticipate discharge in >72 hrs to home  Code Status: Level 1 - Full Code    Subjective:   Mr Rk Paz reports feeling wiped out today  He denies CP  He reports some discomfort at surgery site     Objective:     Vitals:   Temp (24hrs), Av 6 °F (37 °C), Min:98 2 °F (36 8 °C), Max:99 2 °F (37 3 °C)    Temp:  [98 2 °F (36 8 °C)-99 2 °F (37 3 °C)] 98 7 °F (37 1 °C)  HR:  [100-114] 101  Resp:  [18-22] 18  BP: (128-161)/(72-87) 132/87  SpO2:  [93 %-99 %] 93 %  Body mass index is 30 07 kg/m²  Input and Output Summary (last 24 hours):   No intake or output data in the 24 hours ending 22 1155    Physical Exam:   Physical Exam  Vitals and nursing note reviewed  Constitutional:       Appearance: He is ill-appearing  Comments: Patient seen lying in bed, RN present   Cardiovascular:      Rate and Rhythm: Regular rhythm  Tachycardia present  Pulmonary:      Effort: Pulmonary effort is normal  No respiratory distress  Breath sounds: Normal breath sounds  Comments: High flow   Abdominal:      General: Bowel sounds are normal       Palpations: Abdomen is soft  Tenderness: There is no abdominal tenderness  Musculoskeletal:      Right lower leg: No edema  Skin:     General: Skin is warm  Neurological:      Mental Status: He is alert and oriented to person, place, and time     Psychiatric:         Mood and Affect: Mood normal          Behavior: Behavior normal           Additional Data:     Labs:  Results from last 7 days   Lab Units 22  0540 22  0252 22  0521 22  0703 22  0943   WBC Thousand/uL 25 63*   < > 19 20*   < > 22 62*   HEMOGLOBIN g/dL 6 2*   < > 7 2*   < > 7 7*   HEMATOCRIT % 18 0*   < > 21 5*   < > 24 0* PLATELETS Thousands/uL 252   < > 290   < > 498*   BANDS PCT %  --   --   --   --  3   LYMPHO PCT %  --   --  13*  --  11*   MONO PCT %  --   --  5  --  8   EOS PCT %  --   --  0  --  2    < > = values in this interval not displayed       Results from last 7 days   Lab Units 08/01/22  0540   SODIUM mmol/L 141   POTASSIUM mmol/L 3 3*   CHLORIDE mmol/L 107   CO2 mmol/L 29   BUN mg/dL 37*   CREATININE mg/dL 1 36*   ANION GAP mmol/L 5   CALCIUM mg/dL 8 6   ALBUMIN g/dL 3 1*   TOTAL BILIRUBIN mg/dL 8 08*   ALK PHOS U/L 78   ALT U/L 47   AST U/L 86*   GLUCOSE RANDOM mg/dL 112     Results from last 7 days   Lab Units 07/31/22  0252   INR  1 02                   Lines/Drains:  Invasive Devices  Report    Peripheral Intravenous Line  Duration           Peripheral IV 07/29/22 Left Arm 3 days    Peripheral IV 07/31/22 Left;Ventral (anterior) Forearm 1 day                      Imaging: Reviewed radiology reports from this admission including: ECHO    Recent Cultures (last 7 days):         Last 24 Hours Medication List:   Current Facility-Administered Medications   Medication Dose Route Frequency Provider Last Rate    acetaminophen  975 mg Oral [facility] Xi Hector PA-C      bacitracin  1 small application Topical Daily Samantha Urias PA-C      docusate sodium  100 mg Oral BID Hal Pineda MD      heparin (porcine)  3-30 Units/kg/hr (Order-Specific) Intravenous Titrated Wadsworth Argue, CRNP 20 Units/kg/hr (08/01/22 5894)    heparin (porcine)  3,400 Units Intravenous Q1H PRN Wadsworth Argue, CRNP      heparin (porcine)  6,800 Units Intravenous Q1H PRN Wadsworth Argue, CRNP      hydrochlorothiazide  25 mg Oral QAM Taryn Hector PA-C      metoprolol succinate  12 5 mg Oral QAM aTryn Hector PA-C      ondansetron  4 mg Intravenous Q4H PRN Penne RossBAN      oxyCODONE  5 mg Oral Q4H PRN Taryn Hector PA-C      pantoprazole  40 mg Oral Early Morning Yadira Hector PA-C      polyethylene glycol  17 g Oral Daily Gini Hollingsworth MD      potassium chloride  20 mEq Oral QAM Yadira Aviles [place], [place]      predniSONE  40 mg Oral Daily Iza Carrizales      traMADol  50 mg Oral Q6H [facility] [place], [place]          Today, Patient Was Seen By: Jazzy Francisco PA-C    **Please Note: This note may have been constructed using a voice recognition system  **

## 2022-08-01 NOTE — CASE MANAGEMENT
Case Management Assessment & Discharge Planning Note    Patient name Jeanine Coello  Location [facility] 910/[facility] 708-73 MRN [id number]  : 1955 Date 2022       Current Admission Date: 2022  Current Admission Diagnosis:Dyspnea   Patient Active Problem List    Diagnosis Date Noted    Elevated serum creatinine 2022    Elevated bilirubin 2022    Leukocytosis 2022    Dyspnea 2022    Acute [facility] failure with hypoxia ([facility] ) 2022    Hypoxia 2022    Malignant melanoma of leg, right ([facility] ) 2022    Hypercholesterolemia 2021    Chronic bilateral low back pain with bilateral sciatica 10/08/2021    Hyperglycemia 2021    Primary localized osteoarthrosis of the knee, right 2021    Thrombocytosis 2021    COVID-19 2020    Pain in joint, lower leg 11/10/2020    Primary osteoarthritis of left knee 2020    Pain in left knee 2020    Auto immune neutropenia ([facility] ) 2020    Glucose intolerance (impaired glucose tolerance) 2020    Renal insufficiency 2020    Essential hypertension 2019    Posttraumatic stress disorder 10/28/2019    Iron overload due to repeated red blood cell transfusions 2018    ARABELLA (acute kidney injury) ([facility] ) 2018    Autoimmune hemolytic anemia     Shortness of breath 2017    Gastroesophageal reflux disease 2017    Elevated blood pressure reading without diagnosis of hypertension 2017    Portal vein thrombosis 2017    Pulmonary embolism with acute cor pulmonale (HCC) 2017    Splenomegaly 2017    Symptomatic anemia 2017    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 [facility]  Phone: [phone] Fax: [phone]    Primary Care Provider: Lencho Hobson DO    Primary Insurance: MEDICARE  Secondary Insurance: AARSKY    ASSESSMENT:  Active Health Care Proxies    There are no active Health Care Proxies on file  Patient Information  Admitted from[de-identified] Home  Mental Status: Alert  During Assessment patient was accompanied by: Not accompanied during assessment  Assessment information provided by[de-identified] Patient  Primary Caregiver: Self  Support Systems: Family members  What city do you live in[place] [employer] entry access options   Select all that apply : Stairs  Number of steps to enter home : 10  Do the steps have railings?: Yes  Type of Current Residence: 2 story home  Upon entering residence, is there a bedroom on the main floor (no further steps)?: No  A bedroom is located on the following floor levels of residence (select all that apply):: 2nd Floor  Upon entering residence, is there a bathroom on the main floor (no further steps)?: No  Indicate which floors of current residence have a bathroom (select all the apply):: 2nd Floor  Number of steps to 2nd floor from main floor: 10  In the last 12 months, was there a time when you were not able to pay the mortgage or rent on time?: No  In the last 12 months, how many places have you lived?: 1  In the last 12 months, was there a time when you did not have a steady place to sleep or slept in a shelter (including now)?: No  Living Arrangements: Lives w/ Spouse/significant other    Activities of Daily Living Prior to Admission  Functional Status: Independent  Completes ADLs independently?: Yes  Ambulates independently?: Yes  Does patient use assisted devices?: No  Does patient currently own DME?: Yes  What DME does the patient currently own?: [employer]  Does patient have a history of Outpatient Therapy (PT/OT)?: Yes  Does the patient have a history of Short-Term Rehab?: No  Does patient have a history of HHC?: No  Does patient currently have [facility]?: No         Patient Information Continued  Income Source: Pension/detention  Does patient have prescription coverage?: Yes  Within the past 12 months, you worried that your food would run out before you got the money to buy more : Never true  Within the past 12 months, the food you bought just didn't last and you didn't have money to get more : Never true  Does patient receive dialysis treatments?: No  Does patient have a history of substance abuse?: No  Does patient have a history of Mental Health Diagnosis?: Yes (PTSD)  Has patient received inpatient treatment related to mental health in the last 2 years?: No         Means of Transportation  Means of Transport to Appts[de-identified] Drives Self  In the past 12 months, has lack of transportation kept you from medical appointments or from getting medications?: No  In the past 12 months, has lack of transportation kept you from meetings, work, or from getting things needed for daily living?: No        DISCHARGE DETAILS:    Discharge planning discussed with[de-identified] Patient  Freedom of Choice: Yes        Were Treatment Team discharge recommendations reviewed with patient/caregiver?: Yes  Did patient/caregiver verbalize understanding of patient care needs?: Yes  Were patient/caregiver advised of the risks associated with not following Treatment Team discharge recommendations?: Yes                   Other Referral/Resources/Interventions Provided:  Referral Comments: awaiting recommendations at this time            Additional Comments: patient confirms that he has received a total of 3 COVID vaccines (1st dose 3/11/2021 Dwaine/Dwaine, 2nd dose 1/11/22 [employer], and states his 3rd vaccine was not listed on his COVID card) / patient is also currently on HFNC and is not on oxygen at baseline                    CM reviewed d/c planning process including the following: identifying help at home, patient preference for d/c planning needs, Discharge Lounge, [facility] Meds to Bed program, availability of treatment team to discuss questions or concerns patient and/or family may have regarding understanding medications and recognizing signs and symptoms once discharged  CM also encouraged patient to follow up with all recommended appointments after discharge  Patient advised of importance for patient and family to participate in managing patients medical well being  Patient/caregiver received discharge checklist   Content reviewed  Patient/caregiver encouraged to participate in discharge plan of care prior to discharge home  UPDATE: BAUDILIO SPOKE WITH BEATRIZ - PATIENT WITH A PE AT THIS TIME  D/C IS LIKELY NOT FOR A FEW DAYS YET

## 2022-08-01 NOTE — PLAN OF CARE
Problem: MOBILITY - ADULT  Goal: Maintain or return to baseline ADL function  Description: INTERVENTIONS:  -  Assess patient's ability to carry out ADLs; assess patient's baseline for ADL function and identify physical deficits which impact ability to perform ADLs (bathing, care of mouth/teeth, toileting, grooming, dressing, etc )  - Assess/evaluate cause of self-care deficits   - Assess range of motion  - Assess patient's mobility; develop plan if impaired  - Assess patient's need for assistive devices and provide as appropriate  - Encourage maximum independence but intervene and supervise when necessary  - Involve family in performance of ADLs  - Assess for home care needs following discharge   - Consider OT consult to assist with ADL evaluation and planning for discharge  - Provide patient education as appropriate  Outcome: Progressing  Goal: Maintains/Returns to pre admission functional level  Description: INTERVENTIONS:  - Perform BMAT or MOVE assessment daily    - Set and communicate daily mobility goal to care team and patient/family/caregiver  - Collaborate with rehabilitation services on mobility goals if consulted  - Perform Range of Motion  times a day  - Reposition patient every  hours    - Dangle patient times a day  - Stand patient  times a day  - Ambulate patient  times a day  - Out of bed to chair  times a day   - Out of bed for meals  times a day  - Out of bed for toileting  - Record patient progress and toleration of activity level   Outcome: Progressing     Problem: PAIN - ADULT  Goal: Verbalizes/displays adequate comfort level or baseline comfort level  Description: Interventions:  - Encourage patient to monitor pain and request assistance  - Assess pain using appropriate pain scale  - Administer analgesics based on type and severity of pain and evaluate response  - Implement non-pharmacological measures as appropriate and evaluate response  - Consider cultural and social influences on pain and pain management  - Notify physician/advanced practitioner if interventions unsuccessful or patient reports new pain  Outcome: Progressing     Problem: INFECTION - ADULT  Goal: Absence or prevention of progression during hospitalization  Description: INTERVENTIONS:  - Assess and monitor for signs and symptoms of infection  - Monitor lab/diagnostic results  - Monitor all insertion sites, i e  indwelling lines, tubes, and drains  - Monitor endotracheal if appropriate and nasal secretions for changes in amount and color  - Royal appropriate cooling/warming therapies per order  - Administer medications as ordered  - Instruct and encourage patient and family to use good hand hygiene technique  - Identify and instruct in appropriate isolation precautions for identified infection/condition  Outcome: Progressing  Goal: Absence of fever/infection during neutropenic period  Description: INTERVENTIONS:  - Monitor WBC    Outcome: Progressing     Problem: SAFETY ADULT  Goal: Maintain or return to baseline ADL function  Description: INTERVENTIONS:  -  Assess patient's ability to carry out ADLs; assess patient's baseline for ADL function and identify physical deficits which impact ability to perform ADLs (bathing, care of mouth/teeth, toileting, grooming, dressing, etc )  - Assess/evaluate cause of self-care deficits   - Assess range of motion  - Assess patient's mobility; develop plan if impaired  - Assess patient's need for assistive devices and provide as appropriate  - Encourage maximum independence but intervene and supervise when necessary  - Involve family in performance of ADLs  - Assess for home care needs following discharge   - Consider OT consult to assist with ADL evaluation and planning for discharge  - Provide patient education as appropriate  Outcome: Progressing  Goal: Maintains/Returns to pre admission functional level  Description: INTERVENTIONS:  - Perform BMAT or MOVE assessment daily    - Set and communicate daily mobility goal to care team and patient/family/caregiver  - Collaborate with rehabilitation services on mobility goals if consulted  - Perform Range of Motion  times a day  - Reposition patient every  hours    - Dangle patient  times a day  - Stand patient  times a day  - Ambulate patient  times a day  - Out of bed to chair  times a day   - Out of bed for meals  times a day  - Out of bed for toileting  - Record patient progress and toleration of activity level   Outcome: Progressing  Goal: Patient will remain free of falls  Description: INTERVENTIONS:  - Educate patient/family on patient safety including physical limitations  - Instruct patient to call for assistance with activity   - Consult OT/PT to assist with strengthening/mobility   - Keep Call bell within reach  - Keep bed low and locked with side rails adjusted as appropriate  - Keep care items and personal belongings within reach  - Initiate and maintain comfort rounds  - Make Fall Risk Sign visible to staff  - Offer Toileting every  Hours, in advance of need  - Initiate/Maintain alarm  - Obtain necessary fall risk management equipment:   - Apply yellow socks and bracelet for high fall risk patients  - Consider moving patient to room near nurses station  Outcome: Progressing     Problem: DISCHARGE PLANNING  Goal: Discharge to home or other facility with appropriate resources  Description: INTERVENTIONS:  - Identify barriers to discharge w/patient and caregiver  - Arrange for needed discharge resources and transportation as appropriate  - Identify discharge learning needs (meds, wound care, etc )  - Arrange for interpretive services to assist at discharge as needed  - Refer to Case Management Department for coordinating discharge planning if the patient needs post-hospital services based on physician/advanced practitioner order or complex needs related to functional status, cognitive ability, or social support system  Outcome: Progressing Problem: Knowledge Deficit  Goal: Patient/family/caregiver demonstrates understanding of disease process, treatment plan, medications, and discharge instructions  Description: Complete learning assessment and assess knowledge base    Interventions:  - Provide teaching at level of understanding  - Provide teaching via preferred learning methods  Outcome: Progressing     Problem: Potential for Falls  Goal: Patient will remain free of falls  Description: INTERVENTIONS:  - Educate patient/family on patient safety including physical limitations  - Instruct patient to call for assistance with activity   - Consult OT/PT to assist with strengthening/mobility   - Keep Call bell within reach  - Keep bed low and locked with side rails adjusted as appropriate  - Keep care items and personal belongings within reach  - Initiate and maintain comfort rounds  - Make Fall Risk Sign visible to staff  - Offer Toileting every  Hours, in advance of need  - Initiate/Maintain alarm  - Obtain necessary fall risk management equipment:   - Apply yellow socks and bracelet for high fall risk patients  - Consider moving patient to room near nurses station  Outcome: Progressing

## 2022-08-01 NOTE — ASSESSMENT & PLAN NOTE
· Likely multifactorial in the setting of anemia and PE  · CXR x 2 with no acute cardiopulmonary disease   · Echo revealed LVEF 70%, right ventricular systolic function mildly reduced, moderate to severe TVR  · Patient on high flow, wean as able  · Pulm following as below

## 2022-08-01 NOTE — ASSESSMENT & PLAN NOTE
· History of DVT/PE, Portal vein thrombosis, maintained on Pradaxa as outpatient - per home med/rec in the computer and OP Heme/Onc note, patient on 75mg Pradax BID - unclear why on this dosing  ·  Both Surg/Onc and Heme/Onc cleared to resume Pradaxa 7/30/22  · However, patient was noted to have increasing oxygen requirements  · Pradaxa had been on hold for 2 days for surgical incision per patient  He was on SC heparin for DVT ppx  · Patient reports history of Xarelto failure in the past  · CTA chest PE study revealed RML PE with RRV:LV >0 9 concerning for right ventricular strain  · Echo revealed LVEF 70%, right ventricular systolic function mildy reduced, moderate-severe TVR  · Patient on high flow oxygen, wean as able  · Pulm following   · On heparin GTT   Heme/Onc today who recommended to continue the heparin GTT for a few days and then transition back to Pradaxa but at full dose (150mg BID)  · Per my discussion with Heme/Onc, unclear timing of PE as patient presented with dyspnea for weeks but also had worsening anemia and his Pradaxa was held pre-op  · Less likely Pradaxa failure per Pulm given Pradaxa was on hold for 48 hours prior to surgery  · Not a candidate for endovascular therapy per IR

## 2022-08-01 NOTE — ASSESSMENT & PLAN NOTE
· Follows with Hematology, Dr Jessica Hobson, as outpatient  · Heme/Onc following here, I discussed with them today and appreciate their ongoing recommendations   · Prednisone dose was decreased during admission from 60mg to 40mg per Heme/Onc recs  · With warm and cold antibody  · LDH elevated at 1,077  · Sherry test positive  · Retic count elevated  · Received 1 unit least incompatible blood transfusion on 7/30/22   Hemoglobin 6 2 this AM, additional 1 unit PRBCs ordered by Heme/Onc today  · Heme/Onc recommending to hold off on rituximab for now

## 2022-08-01 NOTE — PROGRESS NOTES
PULMONOLOGY PROGRESS NOTE     Name: Jessica Beyer   Age & Sex: 75 y o  male   MRN: [id number]  Unit/Bed#: [facility] 910-01   Encounter: [id number]    PATIENT INFORMATION     Name: Jessica Beyer   Age & Sex: 75 y o  male   MRN: [id number]  Hospital Stay Days: 3    ASSESSMENT/PLAN     Assessment:   1  Acute respiratory failure with hypoxia  · Per patient, SOB has been present for +4 weeks, however significantly worsened after surgery  · Currently on HFNC 50L/55%  · Likely multifactorial due to anemia, surgery, atelectasis and acute PE  2  Acute subsegmental PE in RML  ? Evidenced in CTA PE study  ? IR consulted  No acute intervention is possible on their end due to subsegmental PE  ? 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    · On chronic prednisone therapy  · Hematology following  · Hg trending down  5  S/P wide excision of right medial thigh melanoma and sentinel node biopsy (POD3)  6  Indirect hyperbilirrubinemia  7  S/P remote splenectomy with chronic leukocytosis and thrombocytosis  8  HTN  9  GERD    Plan:   Monitor SpO2 and Wean FiO2 as tolerated to maintain SpO2>88%   Continue Heparin gtt   Continue level 2 SD for now  If O2 requirement increases and/or clinical status changes, please TT pulmonary/critical care team    Rest of care per primary team and hematology      Subjective      Patient seen and examined  Patient reports feeling frustrated regarding his hematologic condition   He otherwise denies worsening shortness of breath or chest pain  He reports pain in the popliteal region of his left leg  He has no other complaints  Objective      Vitals:    22 0718 22 0758 22 0801 22 0808   BP:  132/87 132/87 132/87   Pulse:  101 103 101   Resp:   18    Temp:   98 7 °F (37 1 °C) 98 7 °F (37 1 °C)   TempSrc:       SpO2: 98%  95% 98%   Weight:       Height:          Temperature:   Temp (24hrs), Av 6 °F (37 °C), Min:98 2 °F (36 8 °C), Max:99 2 °F (37 3 °C)    Temperature: 98 7 °F (37 1 °C)  Intake & Output:  I/O        07 07 07 07 07 07    P  O  520      I V  (mL/kg) 1125 (12 9) 76 5 (0 9)     Blood 350      Total Intake(mL/kg)  (22 9) 76 5 (0 9)     Urine (mL/kg/hr) 1600 (0 8)      Stool 0      Total Output 1600      Net +395 +76 5            Unmeasured Urine Occurrence 2 x 6 x     Unmeasured Stool Occurrence 0 x          Weights:   IBW (Ideal Body Weight): 66 1 kg    Body mass index is 30 07 kg/m²  Weight (last 2 days)     Date/Time Weight    22 08:13:24 87 1 (192)        Physical Exam  Vitals and nursing note reviewed  Constitutional:       Appearance: He is not toxic-appearing  HENT:      Head: Normocephalic  Eyes:      General: No scleral icterus  Pupils: Pupils are equal, round, and reactive to light  Cardiovascular:      Rate and Rhythm: Regular rhythm  Tachycardia present  Heart sounds: No murmur heard  No gallop  Pulmonary:      Effort: Pulmonary effort is normal       Breath sounds: No wheezing, rhonchi or rales  Comments: HFNC  Abdominal:      General: Bowel sounds are normal  There is no distension  Palpations: Abdomen is soft  Tenderness: There is no abdominal tenderness  There is no guarding  Musculoskeletal:         General: Tenderness (surrounding surgical incision area in RLE and popliteal region of LLE) present  Skin:     General: Skin is warm        Capillary Refill: Capillary refill takes less than 2 seconds  Neurological:      General: No focal deficit present  Mental Status: He is alert and oriented to person, place, and time  Cranial Nerves: No cranial nerve deficit  Psychiatric:         Mood and Affect: Mood is depressed  LABORATORY DATA     Labs: I have personally reviewed pertinent reports  Results from last 7 days   Lab Units 08/01/22  0540 07/31/22  0252 07/30/22  0521 07/29/22  0703 07/27/22  0943   WBC Thousand/uL 25 63* 24 26* 19 20*   < > 22 62*   HEMOGLOBIN g/dL 6 2* 8 0* 7 2*   < > 7 7*   HEMATOCRIT % 18 0* 22 3* 21 5*   < > 24 0*   PLATELETS Thousands/uL 252 271 290   < > 498*   MONO PCT %  --   --  5  --  8    < > = values in this interval not displayed  Results from last 7 days   Lab Units 08/01/22  0540 07/31/22  0519 07/30/22  0521   POTASSIUM mmol/L 3 3* 3 8 3 6   CHLORIDE mmol/L 107 105 105   CO2 mmol/L 29 28 27   BUN mg/dL 37* 29* 33*   CREATININE mg/dL 1 36* 1 37* 1 55*   CALCIUM mg/dL 8 6 8 8 9 2   ALK PHOS U/L 78 93 79   ALT U/L 47 41 30   AST U/L 86* 66* 46*     Results from last 7 days   Lab Units 07/30/22  0521   MAGNESIUM mg/dL 2 4     Results from last 7 days   Lab Units 07/30/22  0521   PHOSPHORUS mg/dL 3 5      Results from last 7 days   Lab Units 08/01/22  0540 07/31/22  1559 07/31/22  0922 07/31/22  0252   INR   --   --   --  1 02   PTT seconds 59* 65* 68* 21*                 ABG:       Micro:         IMAGING & DIAGNOSTIC TESTING     Radiology Results: I have personally reviewed pertinent reports  No results found  Other Diagnostic Testing: I have personally reviewed pertinent reports      ACTIVE MEDICATIONS     Current Facility-Administered Medications   Medication Dose Route Frequency    acetaminophen (TYLENOL) tablet 975 mg  975 mg Oral Q8H [address]    bacitracin topical ointment 1 small application  1 small application Topical Daily    docusate sodium (COLACE) capsule 100 mg  100 mg Oral BID    heparin (porcine) 25,000 units in 0 45% NaCl 250 mL infusion (premix)  3-30 Units/kg/hr (Order-Specific) Intravenous Titrated    heparin (porcine) injection 3,400 Units  3,400 Units Intravenous Q1H PRN    heparin (porcine) injection 6,800 Units  6,800 Units Intravenous Q1H PRN    hydrochlorothiazide (HYDRODIURIL) tablet 25 mg  25 mg Oral QAM    metoprolol succinate (TOPROL-XL) 24 hr tablet 12 5 mg  12 5 mg Oral QAM    ondansetron (ZOFRAN) injection 4 mg  4 mg Intravenous Q4H PRN    oxyCODONE (ROXICODONE) IR tablet 5 mg  5 mg Oral Q4H PRN    pantoprazole (PROTONIX) EC tablet 40 mg  40 mg Oral Early Morning    polyethylene glycol (MIRALAX) packet 17 g  17 g Oral Daily    potassium chloride (K-DUR,KLOR-CON) CR tablet 20 mEq  20 mEq Oral QAM    predniSONE tablet 40 mg  40 mg Oral Daily    traMADol (ULTRAM) tablet 50 mg  50 mg Oral Q6H [address]       VTE Pharmacologic Prophylaxis: Heparin gtt  VTE Mechanical Prophylaxis: sequential compression device      Disclaimer: Portions of the record may have been created with voice recognition software  Occasional wrong word or "sound a like" substitutions may have occurred due to the inherent limitations of voice recognition software  Careful consideration should be taken to recognize, using context, where substitutions have occurred      Roosevelt Mcclellan MD   Pulmonary and Critical Care Fellow, PGY-4  [facility]

## 2022-08-01 NOTE — PROGRESS NOTES
Medical Oncology/Hematology Progress Note  Alaina Contreras, male, 75 y o , 1955,  PPHP 910/PPHP 910-01, [id number]     Assessment and Plan    1  Autoimmune hemolytic anemia  · Dx in October of 2016  · Splenectomy in May 2017  · Rituximab and cyclophosphamide infusions in 2018  · Chronic prednisone therapy  Most recently (7/06) started on prednisone 60 mg daily  Dose decreased to 40 mg on 7/29  · Positive for both warm and cold autoantibodies  · For the past month, patient has been experiencing fatigue and SOB due to persistent anemia for which he received a blood transfusion on 7/18  However, hemoglobin dropped over recent days  · Hg 6 2 (today) from 8 0 (7/31)  , likely due to cold autoantibodies  · 7/05 Iron panel: Ferritin 370, Iron 243, Sat 57%, TIBC 423  · Tbili 9 44 (7/31) from 7 05 (7/30)  · 7/30 LDH: 1,077  · 7/30 Haptoglobin: <10  · 7/30 Retic count abs 574,500  · Received 1U RBCs 7/29  Plan:   · Trial of rituximab infusion   · Continue prednisone 40 mg daily, f/u prednisone regimen outpatient  · Await transfusion    · Continue to monitor CBC    2  Malignant melanoma  · T4 melanoma on right medial thigh  · Excision and sentinel lymph node biopsy on 7/29  Plan:  · F/u outpatient  · Tissue exam pending    3  Pulmonary embolism  · PE on 7/30 while dabigatran was held for melanoma procedure  Plan:  · Management per pulmonary and primary team  · May restart dabigatran for long-term anticoagulation when he is ready      Outpatient follow up plan: Heme/onc appointment scheduled with Jessica Choi on 10/13/22  Patient known to Dr Alyssa Guerrero  Communication with patient/family:  I did update the patient  I did review this patient with Dr Richard Wisdom and they are in agreement with this plan  Subjective:    Patient seen and examined  States this has been his worst day so far physically and mentally  Continues to have fatigue, SOB, and his cough has worsened over the past 4 days   Has some discomfort in his right groin at the site of lymph node biopsy as expected  Endorses constipation for the past 4 days  Reports feeling stressed about his current illness with hemolysis and the pending results of his recent melanoma excision  States he has strong support from his family at home and has been communicating with them over the phone  Review of Systems   Constitutional: Positive for fatigue  Negative for chills, diaphoresis, fever and unexpected weight change  HENT: Negative for nosebleeds  Eyes: Positive for visual disturbance  Sees flashes of light at times   Respiratory: Positive for cough and shortness of breath  Cardiovascular: Negative for chest pain and leg swelling  Gastrointestinal: Positive for constipation  Negative for abdominal distention and abdominal pain  Genitourinary: Negative for dysuria  Neurological: Negative for headaches  Hematological: Negative for adenopathy  All other systems reviewed and are negative          Objective:     Medication Administration - last 24 hours from 07/31/2022 1258 to 08/01/2022 1258       Date/Time Order Dose Route Action Action by     08/01/2022 0913 hydrochlorothiazide (HYDRODIURIL) tablet 25 mg   Oral Canceled Entry Taya Swan     08/01/2022 0758 hydrochlorothiazide (HYDRODIURIL) tablet 25 mg 25 mg Oral Given Taiwo Berger RN     08/01/2022 0913 metoprolol succinate (TOPROL-XL) 24 hr tablet 12 5 mg   Oral Canceled Entry Taya Swan     08/01/2022 0758 metoprolol succinate (TOPROL-XL) 24 hr tablet 12 5 mg 12 5 mg Oral Given Taiwo Berger RN     08/01/2022 0529 pantoprazole (PROTONIX) EC tablet 40 mg 40 mg Oral Given Virginia Chavez[address]     08/01/2022 0772 potassium chloride (K-DUR,KLOR-CON) CR tablet 20 mEq   Oral Canceled Entry Taya Swan     08/01/2022 0758 potassium chloride (K-DUR,KLOR-CON) CR tablet 20 mEq 20 mEq Oral Given Taiwo Berger RN     08/01/2022 1400 acetaminophen (TYLENOL) tablet 975 mg 0 mg Oral Hold Azalea Ellis RN     08/01/2022 8669 acetaminophen (TYLENOL) tablet 975 mg 975 mg Oral Refused [address] [facility] [address], [facility]     07/31/2022 2227 acetaminophen (TYLENOL) tablet 975 mg 975 mg Oral Refused [address] [facility] [address], [facility]     07/31/2022 1400 acetaminophen (TYLENOL) tablet 975 mg 0 mg Oral Hold Pham Viramontes RN     08/01/2022 0530 traMADol (ULTRAM) tablet 50 mg 50 mg Oral 123 [facility] [place] [facility]""     08/01/2022 0100 traMADol (ULTRAM) tablet 50 mg 50 mg Oral Refused [address] [facility] [address], [facility]     07/31/2022 1800 traMADol (ULTRAM) tablet 50 mg 0 mg Oral Hold Pham Viramontes RN     08/01/2022 0912 docusate sodium (COLACE) capsule 100 mg   Oral Canceled Entry Nannette Nesha     08/01/2022 0758 docusate sodium (COLACE) capsule 100 mg 100 mg Oral Given Azalea Ellis RN     07/31/2022 1532 docusate sodium (COLACE) capsule 100 mg 100 mg Oral Given Pham Viramontes RN     08/01/2022 0924 polyethylene glycol (MIRALAX) packet 17 g   Oral Canceled Entry Nannette Nesha     08/01/2022 0758 polyethylene glycol (MIRALAX) packet 17 g 17 g Oral Given Azalea Ellis RN     08/01/2022 0915 predniSONE tablet 40 mg   Oral Canceled Entry Nannette Nesha     08/01/2022 0758 predniSONE tablet 40 mg 40 mg Oral Given Azalea Ellis RN     08/01/2022 9629 heparin (porcine) 25,000 units in 0 45% NaCl 250 mL infusion (premix) 20 Units/kg/hr Intravenous Rate/Dose Change [address] [place] [address], RN     07/31/2022 1910 heparin (porcine) 25,000 units in 0 45% NaCl 250 mL infusion (premix) 18 Units/kg/hr Intravenous [facility]     07/31/2022 1639 heparin (porcine) 25,000 units in 0 45% NaCl 250 mL infusion (premix) 18 Units/kg/hr Intravenous Rate/Dose Verify Pham Viramontes RN     08/01/2022 0702 heparin (porcine) injection 3,400 Units 3,400 Units Intravenous Given [facility] [address], RN     08/01/2022 1019 bacitracin topical ointment 1 small application 1 small application Topical Given Cheryl Reyes RN          /87   Pulse 101   Temp 98 7 °F (37 1 °C)   Resp 18   Ht 5' 7" (1 702 m)   Wt 87 1 kg (192 lb)   SpO2 93%   BMI 30 07 kg/m²       Physical Exam  Constitutional:       General: He is not in acute distress  Appearance: He is obese  He is not toxic-appearing or diaphoretic  HENT:      Head: Normocephalic and atraumatic  Mouth/Throat:      Mouth: Mucous membranes are moist    Eyes:      General: Scleral icterus present  Conjunctiva/sclera: Conjunctivae normal    Cardiovascular:      Rate and Rhythm: Regular rhythm  Tachycardia present  Pulses: Normal pulses  Heart sounds: Normal heart sounds  No murmur heard  No friction rub  No gallop  Pulmonary:      Effort: Pulmonary effort is normal  No respiratory distress  Breath sounds: Normal breath sounds  No wheezing, rhonchi or rales  Abdominal:      General: Bowel sounds are normal  There is no distension  Tenderness: There is no abdominal tenderness  There is no guarding  Comments: Central adiposity   Musculoskeletal:      Right lower leg: No edema  Left lower leg: No edema  Skin:     General: Skin is warm and dry  Neurological:      Mental Status: He is alert and oriented to person, place, and time             Recent Results (from the past 48 hour(s))   COVID only    Collection Time: 07/30/22  8:25 PM    Specimen: Nose; Nares   Result Value Ref Range    SARS-CoV-2 Negative Negative   HS Troponin 0hr (reflex protocol)    Collection Time: 07/31/22  2:52 AM   Result Value Ref Range    hs TnI 0hr 383 (H) "Refer to ACS Flowchart"- see link ng/L   NT-BNP PRO    Collection Time: 07/31/22  2:52 AM   Result Value Ref Range    NT-proBNP 2,757 (H) <125 pg/mL   APTT    Collection Time: 07/31/22  2:52 AM   Result Value Ref Range    PTT 21 (L) 23 - 37 seconds   CBC    Collection Time: 07/31/22  2:52 AM   Result Value Ref Range    WBC 24 26 (H) 4 31 - 10 16 Thousand/uL    RBC 1 46 (L) 3 88 - 5 62 Million/uL    Hemoglobin 8 0 (L) 12 0 - 17 0 g/dL    Hematocrit 22 3 (L) 36 5 - 49 3 %     (H) 82 - 98 fL    MCH 54 8 (H) 26 8 - 34 3 pg    MCHC 35 9 31 4 - 37 4 g/dL    Platelets 528 320 - 404 Thousands/uL    MPV 10 0 8 9 - 12 7 fL   Protime-INR    Collection Time: 07/31/22  2:52 AM   Result Value Ref Range    Protime 13 6 11 6 - 14 5 seconds    INR 1 02 0 84 - 1 19   ECG 12 lead    Collection Time: 07/31/22  2:52 AM   Result Value Ref Range    Ventricular Rate 103 BPM    Atrial Rate 103 BPM    NJ Interval 152 ms    QRSD Interval 84 ms    QT Interval 346 ms    QTC Interval 453 ms    P Axis 46 degrees    QRS Axis 46 degrees    T Wave [place] 28 degrees   HS Troponin I 2hr    Collection Time: 07/31/22  5:19 AM   Result Value Ref Range    hs TnI 2hr 489 (H) "Refer to ACS Flowchart"- see link ng/L    Delta 2hr hsTnI 106 (H) <20 ng/L   Comprehensive metabolic panel    Collection Time: 07/31/22  5:19 AM   Result Value Ref Range    Sodium 140 135 - 147 mmol/L    Potassium 3 8 3 5 - 5 3 mmol/L    Chloride 105 96 - 108 mmol/L    CO2 28 21 - 32 mmol/L    ANION GAP 7 4 - 13 mmol/L    BUN 29 (H) 5 - 25 mg/dL    Creatinine 1 37 (H) 0 60 - 1 30 mg/dL    Glucose 165 (H) 65 - 140 mg/dL    Calcium 8 8 8 3 - 10 1 mg/dL    AST 66 (H) 5 - 45 U/L    ALT 41 12 - 78 U/L    Alkaline Phosphatase 93 46 - 116 U/L    Total Protein 6 1 (L) 6 4 - 8 4 g/dL    Albumin 3 5 3 5 - 5 0 g/dL    Total Bilirubin 9 44 (H) 0 20 - 1 00 mg/dL    eGFR 52 ml/min/1 73sq m   Prepare Leukoreduced RBC: 1 Units, CMV Negative, Irradiated    Collection Time: 07/31/22  5:55 AM   Result Value Ref Range    Unit Product Code U5141O13     Unit Number [id number]     Unit ABO A     Unit RH POS     Crossmatch Incompatible     Unit Dispense Status Presumed Trans     Unit Product Volume 350 mL   Echo complete w/ contrast if indicated    Collection Time: 07/31/22  8:45 AM   Result Value Ref Range    A4C EF 58 %    LVIDd 3 30 cm    LVIDS 2 10 cm    IVSd 1 20 cm    LVPWd 1 30 cm    FS 36 28 - 44    MV E' Tissue Velocity Septal 12 cm/s E wave deceleration time 140 ms    MV Peak E Marc 61 cm/s    MV Peak A Marc 1 04 m/s    RVID d 4 9 cm    LA size 3 7 cm    LA length (A2C) 6 30 cm    RAA A4C 22 5 cm2    MV stenosis pressure 1/2 time 41 ms    MV valve area p 1/2 method 5 37     TR Peak Marc 4 5 m/s    Triscuspid Valve Regurgitation Peak Gradient 75 0 mmHg    Ao root 3 60 cm    Asc Ao 3 5 cm    Tricuspid valve peak regurgitation velocity 4 46 m/s    Left ventricular stroke volume (2D) 28 00 mL    IVS 1 2 cm    LEFT VENTRICLE SYSTOLIC VOLUME (MOD BIPLANE) 2D 15 mL    LV DIASTOLIC VOLUME (MOD BIPLANE) 2D 44 mL    Left Atrium Area-systolic Four Chamber 85 8 cm2    Left Atrium Area-systolic Apical Two Chamber 20 3 cm2    LVSV, 2D 28 mL    LV EF 70     Est  RA pres 5 0 mmHg    Right Ventricular Peak Systolic Pressure 83 18 mmHg   HS Troponin I 4hr    Collection Time: 07/31/22  9:22 AM   Result Value Ref Range    hs TnI 4hr 428 (H) "Refer to ACS Flowchart"- see link ng/L    Delta 4hr hsTnI 45 (H) <20 ng/L   APTT    Collection Time: 07/31/22  9:22 AM   Result Value Ref Range    PTT 68 (H) 23 - 37 seconds   APTT    Collection Time: 07/31/22  3:59 PM   Result Value Ref Range    PTT 65 (H) 23 - 37 seconds   CBC    Collection Time: 08/01/22  5:40 AM   Result Value Ref Range    WBC 25 63 (H) 4 31 - 10 16 Thousand/uL    RBC 1 14 (L) 3 88 - 5 62 Million/uL    Hemoglobin 6 2 (LL) 12 0 - 17 0 g/dL    Hematocrit 18 0 (L) 36 5 - 49 3 %     (H) 82 - 98 fL    MCH 54 4 (H) 26 8 - 34 3 pg    MCHC 34 4 31 4 - 37 4 g/dL    RDW 29 1 (H) 11 6 - 15 1 %    Platelets 236 773 - 336 Thousands/uL    MPV 10 4 8 9 - 12 7 fL   Comprehensive metabolic panel    Collection Time: 08/01/22  5:40 AM   Result Value Ref Range    Sodium 141 135 - 147 mmol/L    Potassium 3 3 (L) 3 5 - 5 3 mmol/L    Chloride 107 96 - 108 mmol/L    CO2 29 21 - 32 mmol/L    ANION GAP 5 4 - 13 mmol/L    BUN 37 (H) 5 - 25 mg/dL    Creatinine 1 36 (H) 0 60 - 1 30 mg/dL    Glucose 112 65 - 140 mg/dL    Calcium 8 6 8 3 - 10 1 mg/dL    Corrected Calcium 9 3 8 3 - 10 1 mg/dL    AST 86 (H) 5 - 45 U/L    ALT 47 12 - 78 U/L    Alkaline Phosphatase 78 46 - 116 U/L    Total Protein 5 4 (L) 6 4 - 8 4 g/dL    Albumin 3 1 (L) 3 5 - 5 0 g/dL    Total Bilirubin 8 08 (H) 0 20 - 1 00 mg/dL    eGFR 53 ml/min/1 73sq m   APTT    Collection Time: 08/01/22  5:40 AM   Result Value Ref Range    PTT 59 (H) 23 - 37 seconds   Type and screen    Collection Time: 08/01/22  9:27 AM   Result Value Ref Range    Antibody Screen Positive     Specimen Expiration Date [id number]        XR chest portable    Result Date: 7/31/2022  Narrative: CHEST INDICATION:   Hypoxemia  COMPARISON:  7/29/2022 EXAM PERFORMED/VIEWS:  XR CHEST PORTABLE FINDINGS: Cardiomediastinal silhouette appears unremarkable  The lungs are clear  No pneumothorax or pleural effusion  Osseous structures appear within normal limits for patient age  Impression: No acute cardiopulmonary disease  Workstation performed: [id number]     XR chest portable    Result Date: 7/29/2022  Narrative: CHEST INDICATION:   Shortness of breath  COMPARISON:  12/18/2020  EXAM PERFORMED/VIEWS:  XR CHEST PORTABLE FINDINGS:  Monitoring leads and clips project over the chest  Cardiomediastinal silhouette appears stable  The lungs are clear  No pneumothorax or pleural effusion  Degenerative changes  Left upper quadrant embolization coils  Impression: No acute cardiopulmonary disease  Workstation performed: [id number]     NM lymphatic melanoma    Result Date: 7/29/2022  Narrative: SENTINEL NODE LYMPHOSCINTIGRAPHY INDICATION:   Lower extremity melanoma  FINDINGS: 0 55 mCi Tc-99m sulfur colloid (0 6 cc volume) was administered intradermally in divided doses by Dr Christina Urban in the region of the patients right lower extremity melanoma  Scintigraphic images were obtained over the right groin and right lower extremity in multiple projections   Single node is identified within the right groin Using scintigraphic guidance, the corresponding skin site was marked with an indelible marker  The patient was transferred to the operating room in satisfactory condition  Impression: 1  [place] lymph node localized to right inguinal region  Workstation performed: [id number]     CTA chest pe study    Result Date: 7/31/2022  Narrative: CTA - CHEST WITH IV CONTRAST - PULMONARY ANGIOGRAM INDICATION:   tachypnea, tachycardia, R/O PE  COMPARISON: 4/30/2022  TECHNIQUE: CTA examination of the chest was performed using angiographic technique according to a protocol specifically tailored to evaluate for pulmonary embolism  Axial, sagittal, and coronal 2D reformatted images were created from the source data and  submitted for interpretation  In addition, coronal 3D MIP postprocessing was performed on the acquisition scanner  Radiation dose length product (DLP) for this visit:  572 mGy-cm   This examination, like all CT scans performed in the [facility], was performed utilizing techniques to minimize radiation dose exposure, including the use of iterative reconstruction and automated exposure control  IV Contrast:  70 mL of iohexol (OMNIPAQUE)  FINDINGS: PULMONARY ARTERIAL TREE:  Scattered segmental/subsegmental pulmonary emboli are seen throughout the lung    LUNGS:  Scattered groundglass opacities suggests a small vessel or small airways process process  There is no tracheal or endobronchial lesion  PLEURA:  Unremarkable  HEART/GREAT VESSELS:  RV LV ratio is greater than 0 9 concerning for right ventricular strain    No thoracic aortic aneurysm  MEDIASTINUM AND BRENDA:  Unremarkable  CHEST WALL AND LOWER NECK:   Unremarkable  VISUALIZED STRUCTURES IN THE UPPER ABDOMEN:  Unremarkable  OSSEOUS STRUCTURES:  No acute fracture or destructive osseous lesion  Impression: Right middle lobe pulmonary embolism are noted    RV LV ratio is greater than 0 9 concerning for right ventricular strain     I personally discussed this study with [address] [place] [address] on 7/31/2022 at 2:17 AM  Workstation performed: [id number]     VAS lower limb venous duplex study, complete bilateral    Result Date: 8/1/2022  Narrative:  [facility] REPORT CLINICAL: Indications: PT C/O PE  He is currently receiving IV heparin  Physician wants to rule out B/L LE DVT  Operative History: No prior heart or vascular surgery Risk Factors The patient has history of HTN, current smoking, GERD, autoimmune hemolytic anemia, malignant melanoma right leg, COVID 19 virus (12/20), PE, portal vein thrombus, PTSD, and DVT  Height:  67 inches  Weight:  192 lbs  CONCLUSION:  Impression:  RIGHT LOWER LIMB: Based on color flow imaging, evaluation shows no evidence of acute deep vein thrombosis  The common femoral artery and sapheno-femoral junction were not evaluated secondary to lymph node removal at groin/pain  Tech note: A non-vascular structure measuring 3 47 cm was identified at the groin at the lymph node removal site  No evidence of superficial thrombophlebitis noted  Doppler evaluation shows a normal response to augmentation maneuvers  Popliteal, posterior tibial, and anterior tibial arterial Doppler waveforms are triphasic  LEFT LOWER LIMB: No evidence of acute or chronic deep vein thrombosis  No evidence of superficial thrombophlebitis noted  Doppler evaluation shows a normal response to augmentation maneuvers  Popliteal, posterior tibial, and anterior tibial arterial Doppler waveforms are triphasic  Technical findings were posted on EPIC  SIGNATURE: Electronically Signed by: Marycarmen Rodríguez on 2022-08-01 09:28:42 AM    US inguinal area    Addendum Date: 7/6/2022 Addendum:   ADDENDUM: Images were also obtained through the right groin/inguinal region with no evidence for enlarged lymph nodes  Result Date: 7/6/2022  Narrative: RIGHT ANTERIOR THIGH ULTRASOUND INDICATION:   C43 71: Malignant melanoma of right lower limb, including hip   COMPARISON: Correlation is made with the prior CT study dated 4/30/2022  TECHNIQUE:   Real-time ultrasound of the right anterior thigh was performed with a linear transducer with both volumetric sweeps and still imaging techniques  FINDINGS:  There is a skin lesion demonstrating posterior shadowing measuring approximately 1 3 cm in width with a depth of 0 8 cm likely compatible with known melanoma  No adjacent enlarged lymph nodes are seen in this region  Impression: Skin lesion as above most likely compatible with known melanoma  No adjacent enlarged lymph nodes  Workstation performed: [id number]     US bedside procedure    Result Date: 7/31/2022  Narrative: 1 2 840 [id number] 2 323 [id number] 2    Echo complete w/ contrast if indicated    Result Date: 7/31/2022  Narrative: Eli  Left Ventricle: Left ventricular cavity size is normal  Wall thickness is mildly increased  The left ventricular ejection fraction is 70%  Systolic function is hyperdynamic  Wall motion is normal    Right Ventricle: Right ventricular cavity size is dilated  Systolic function is mildly reduced    Tricuspid Valve: There is moderate to severe regurgitation  The right ventricular systolic pressure is severely elevated  The estimated right ventricular systolic pressure is 80 mmHg  I have personally reviewed labs, imaging studies, and pertinent reports  This note has been generated by voice recognition software system  Therefore, there may be spelling, grammar, and or syntax errors  Please contact if questions arise

## 2022-08-01 NOTE — ASSESSMENT & PLAN NOTE
· Creatinine 1 36 today, baseline appears to be around 1 2-1 3  · Monitor  · Avoid nephrotoxins and hypotension

## 2022-08-02 ENCOUNTER — TELEPHONE (OUTPATIENT)
Dept: HEMATOLOGY ONCOLOGY | Facility: CLINIC | Age: 67
End: 2022-08-02

## 2022-08-02 LAB
ABO GROUP BLD BPU: NORMAL
ALBUMIN SERPL BCP-MCNC: 3.2 G/DL (ref 3.5–5)
ALP SERPL-CCNC: 91 U/L (ref 46–116)
ALT SERPL W P-5'-P-CCNC: 63 U/L (ref 12–78)
ANION GAP SERPL CALCULATED.3IONS-SCNC: 7 MMOL/L (ref 4–13)
ANISOCYTOSIS BLD QL SMEAR: PRESENT
APTT PPP: 74 SECONDS (ref 23–37)
AST SERPL W P-5'-P-CCNC: 115 U/L (ref 5–45)
BASO STIPL BLD QL SMEAR: PRESENT
BASOPHILS # BLD MANUAL: 0 THOUSAND/UL (ref 0–0.1)
BASOPHILS NFR MAR MANUAL: 0 % (ref 0–1)
BILIRUB SERPL-MCNC: 8.53 MG/DL (ref 0.2–1)
BPU ID: NORMAL
BUN SERPL-MCNC: 38 MG/DL (ref 5–25)
CALCIUM ALBUM COR SERPL-MCNC: 9.4 MG/DL (ref 8.3–10.1)
CALCIUM SERPL-MCNC: 8.8 MG/DL (ref 8.3–10.1)
CHLORIDE SERPL-SCNC: 107 MMOL/L (ref 96–108)
CO2 SERPL-SCNC: 26 MMOL/L (ref 21–32)
CREAT SERPL-MCNC: 1.33 MG/DL (ref 0.6–1.3)
CROSSMATCH: NORMAL
EOSINOPHIL # BLD MANUAL: 0 THOUSAND/UL (ref 0–0.4)
EOSINOPHIL NFR BLD MANUAL: 0 % (ref 0–6)
GFR SERPL CREATININE-BSD FRML MDRD: 54 ML/MIN/1.73SQ M
GLUCOSE SERPL-MCNC: 114 MG/DL (ref 65–140)
HCT VFR BLD AUTO: 18.9 % (ref 36.5–49.3)
HGB BLD-MCNC: 6.6 G/DL (ref 12–17)
HOWELL-JOLLY BOD BLD QL SMEAR: PRESENT
LYMPHOCYTES # BLD AUTO: 14 % (ref 14–44)
LYMPHOCYTES # BLD AUTO: 3.26 THOUSAND/UL (ref 0.6–4.47)
MACROCYTES BLD QL AUTO: PRESENT
MCH RBC QN AUTO: 49.6 PG (ref 26.8–34.3)
MCHC RBC AUTO-ENTMCNC: 34.9 G/DL (ref 31.4–37.4)
MCV RBC AUTO: 142 FL (ref 82–98)
MONOCYTES # BLD AUTO: 2.09 THOUSAND/UL (ref 0–1.22)
MONOCYTES NFR BLD: 9 % (ref 4–12)
NEUTROPHILS # BLD MANUAL: 17.69 THOUSAND/UL (ref 1.85–7.62)
NEUTS SEG NFR BLD AUTO: 76 % (ref 43–75)
NRBC BLD AUTO-RTO: 141 /100 WBC (ref 0–2)
PAPPENHEIMER BOD BLD QL SMEAR: PRESENT
PLATELET # BLD AUTO: 259 THOUSANDS/UL (ref 149–390)
PLATELET BLD QL SMEAR: ADEQUATE
PMV BLD AUTO: 11 FL (ref 8.9–12.7)
POIKILOCYTOSIS BLD QL SMEAR: PRESENT
POLYCHROMASIA BLD QL SMEAR: PRESENT
POTASSIUM SERPL-SCNC: 3.4 MMOL/L (ref 3.5–5.3)
PROT SERPL-MCNC: 5.6 G/DL (ref 6.4–8.4)
RBC # BLD AUTO: 1.33 MILLION/UL (ref 3.88–5.62)
RBC MORPH BLD: PRESENT
SODIUM SERPL-SCNC: 140 MMOL/L (ref 135–147)
SPHEROCYTES BLD QL SMEAR: PRESENT
UNIT DISPENSE STATUS: NORMAL
UNIT PRODUCT CODE: NORMAL
UNIT PRODUCT VOLUME: 350 ML
UNIT RH: NORMAL
VARIANT LYMPHS # BLD AUTO: 1 %
WBC # BLD AUTO: 23.27 THOUSAND/UL (ref 4.31–10.16)

## 2022-08-02 PROCEDURE — 85730 THROMBOPLASTIN TIME PARTIAL: CPT | Performed by: STUDENT IN AN ORGANIZED HEALTH CARE EDUCATION/TRAINING PROGRAM

## 2022-08-02 PROCEDURE — 94760 N-INVAS EAR/PLS OXIMETRY 1: CPT

## 2022-08-02 PROCEDURE — 80053 COMPREHEN METABOLIC PANEL: CPT | Performed by: STUDENT IN AN ORGANIZED HEALTH CARE EDUCATION/TRAINING PROGRAM

## 2022-08-02 PROCEDURE — 99232 SBSQ HOSP IP/OBS MODERATE 35: CPT | Performed by: INTERNAL MEDICINE

## 2022-08-02 PROCEDURE — 85007 BL SMEAR W/DIFF WBC COUNT: CPT | Performed by: STUDENT IN AN ORGANIZED HEALTH CARE EDUCATION/TRAINING PROGRAM

## 2022-08-02 PROCEDURE — 99233 SBSQ HOSP IP/OBS HIGH 50: CPT | Performed by: INTERNAL MEDICINE

## 2022-08-02 PROCEDURE — P9058 RBC, L/R, CMV-NEG, IRRAD: HCPCS

## 2022-08-02 PROCEDURE — 85027 COMPLETE CBC AUTOMATED: CPT | Performed by: STUDENT IN AN ORGANIZED HEALTH CARE EDUCATION/TRAINING PROGRAM

## 2022-08-02 RX ADMIN — METOPROLOL SUCCINATE 12.5 MG: 25 TABLET, EXTENDED RELEASE ORAL at 09:35

## 2022-08-02 RX ADMIN — DOCUSATE SODIUM 100 MG: 100 CAPSULE, LIQUID FILLED ORAL at 17:19

## 2022-08-02 RX ADMIN — PANTOPRAZOLE SODIUM 40 MG: 40 TABLET, DELAYED RELEASE ORAL at 05:10

## 2022-08-02 RX ADMIN — TRAMADOL HYDROCHLORIDE 50 MG: 50 TABLET, COATED ORAL at 17:19

## 2022-08-02 RX ADMIN — BACITRACIN ZINC 1 SMALL APPLICATION: 500 OINTMENT TOPICAL at 09:35

## 2022-08-02 RX ADMIN — HEPARIN SODIUM 22 UNITS/KG/HR: 10000 INJECTION, SOLUTION INTRAVENOUS at 17:20

## 2022-08-02 RX ADMIN — DOCUSATE SODIUM 100 MG: 100 CAPSULE, LIQUID FILLED ORAL at 09:34

## 2022-08-02 RX ADMIN — HEPARIN SODIUM 22 UNITS/KG/HR: 10000 INJECTION, SOLUTION INTRAVENOUS at 03:38

## 2022-08-02 RX ADMIN — HYDROCHLOROTHIAZIDE 25 MG: 25 TABLET ORAL at 09:34

## 2022-08-02 RX ADMIN — ACETAMINOPHEN 975 MG: 325 TABLET ORAL at 22:22

## 2022-08-02 RX ADMIN — PREDNISONE 40 MG: 20 TABLET ORAL at 09:34

## 2022-08-02 RX ADMIN — POTASSIUM CHLORIDE 20 MEQ: 1500 TABLET, EXTENDED RELEASE ORAL at 09:35

## 2022-08-02 RX ADMIN — POLYETHYLENE GLYCOL 3350 17 G: 17 POWDER, FOR SOLUTION ORAL at 09:33

## 2022-08-02 RX ADMIN — ACETAMINOPHEN 975 MG: 325 TABLET ORAL at 14:01

## 2022-08-02 NOTE — ASSESSMENT & PLAN NOTE
· Creatinine is at baseline  · Monitor periodically while inpatient  · Avoid nephrotoxins and hypotension

## 2022-08-02 NOTE — PLAN OF CARE
Problem: PAIN - ADULT  Goal: Verbalizes/displays adequate comfort level or baseline comfort level  Description: Interventions:  - Encourage patient to monitor pain and request assistance  - Assess pain using appropriate pain scale  - Administer analgesics based on type and severity of pain and evaluate response  - Implement non-pharmacological measures as appropriate and evaluate response  - Consider cultural and social influences on pain and pain management  - Notify physician/advanced practitioner if interventions unsuccessful or patient reports new pain  Outcome: Progressing     Problem: INFECTION - ADULT  Goal: Absence or prevention of progression during hospitalization  Description: INTERVENTIONS:  - Assess and monitor for signs and symptoms of infection  - Monitor lab/diagnostic results  - Monitor all insertion sites, i e  indwelling lines, tubes, and drains  - Monitor endotracheal if appropriate and nasal secretions for changes in amount and color  - [place] appropriate cooling/warming therapies per order  - Administer medications as ordered  - Instruct and encourage patient and family to use good hand hygiene technique  - Identify and instruct in appropriate isolation precautions for identified infection/condition  Outcome: Progressing  Goal: Absence of fever/infection during neutropenic period  Description: INTERVENTIONS:  - Monitor WBC    Outcome: Progressing

## 2022-08-02 NOTE — QUICK NOTE
Patient continues on HFNC  Transfused 1 uPRBC's  for Hgb of 6 2  Hgb @ 2am 6 6  Little right groin pain  Softer hematoma right groin, anterior right thigh incision clean and dry  Plan: OK for discharge when cleared by primary  Will follow in office  Pathology pending   May shower

## 2022-08-02 NOTE — PROGRESS NOTES
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  5  S/P wide excision of right medial thigh melanoma and sentinel node biopsy (POD4)  6  Indirect hyperbilirrubinemia  7  S/P remote splenectomy   8  History of IgA and IgG deficiency   9  HTN  10  GERD    Plan:   Currently on 6L NC  Continue to wean O2 as tolerated to maintain SpO2>88%   He remains on heparin gtt  Transition to Pradaxa when cleared by hematology   Monitor for development of hemoptysis  · If O2 requirement increases and/or clinical status changes, please TT pulmonary/critical care team   · Rest of care per primary team and hematology        SUBJECTIVE     Patient seen and examined  No acute events overnight  He has been weaned off HFNC and is now on 6L NC  Patient states he feels well overall  He has no other complaints  OBJECTIVE     Vitals:    22 0244 22 0726 22 0746 22 0819   BP: 153/81   150/80   Pulse: 102   104   Resp:    18   Temp: 98 5 °F (36 9 °C)   99 °F (37 2 °C)   TempSrc:       SpO2: 98% 95% 97% 93%   Weight:       Height:          Temperature:   Temp (24hrs), Av 4 °F (36 9 °C), Min:97 3 °F (36 3 °C), Max:99 1 °F (37 3 °C)    Temperature: 99 °F (37 2 °C)  Intake & Output:  I/O        07 07 07 07 07 0700    P  O   0     I V  (mL/kg) 76 5 (0 9)      Blood  350     Total Intake(mL/kg) 76 5 (0 9) 350 (4)     Urine (mL/kg/hr)       Stool       Total Output       Net +76 5 +350            Unmeasured Urine Occurrence 6 x 3 x     Unmeasured Stool Occurrence  2 x         Weights:   IBW (Ideal Body Weight): 66 1 kg    Body mass index is 30 07 kg/m²  Weight (last 2 days)     Date/Time Weight    08/01/22 1400 87 1 (192)    07/31/22 08:13:24 87 1 (192)        Physical Exam  Vitals and nursing note reviewed  Constitutional:       Appearance: He is not toxic-appearing  HENT:      Head: Normocephalic  Eyes:      General: Scleral icterus present  Pupils: Pupils are equal, round, and reactive to light  Cardiovascular:      Rate and Rhythm: Regular rhythm  Tachycardia present  Heart sounds: No murmur heard  No gallop  Pulmonary:      Effort: Pulmonary effort is normal       Breath sounds: No wheezing, rhonchi or rales  Comments: HFNC  Abdominal:      General: Bowel sounds are normal  There is no distension  Palpations: Abdomen is soft  Tenderness: There is no abdominal tenderness  There is no guarding  Musculoskeletal:         General: Tenderness (surrounding surgical incision area in RLE and popliteal region of LLE) present  Skin:     General: Skin is warm  Capillary Refill: Capillary refill takes less than 2 seconds  Coloration: Skin is jaundiced  Neurological:      General: No focal deficit present  Mental Status: He is alert and oriented to person, place, and time  Cranial Nerves: No cranial nerve deficit  Psychiatric:         Mood and Affect: Mood normal            LABORATORY DATA     Labs: I have personally reviewed pertinent reports  Results from last 7 days   Lab Units 08/02/22  0226 08/01/22  0540 07/31/22  0252 07/30/22  0521 07/29/22  0703 07/27/22  0943   WBC Thousand/uL 23 27* 25 63* 24 26* 19 20*   < > 22 62*   HEMOGLOBIN g/dL 6 6* 6 2* 8 0* 7 2*   < > 7 7*   HEMATOCRIT % 18 9* 18 0* 22 3* 21 5*   < > 24 0*   PLATELETS Thousands/uL 259 252 271 290   < > 498*   MONO PCT % 9  --   --  5  --  8    < > = values in this interval not displayed  Results from last 7 days   Lab Units 08/02/22  0226 08/01/22  0540 07/31/22  0519   POTASSIUM mmol/L 3 4* 3 3* 3 8   CHLORIDE mmol/L 107 107 105   CO2 mmol/L 26 29 28   BUN mg/dL 38* 37* 29*   CREATININE mg/dL 1 33* 1 36* 1 37*   CALCIUM mg/dL 8 8 8 6 8 8   ALK PHOS U/L 91 78 93   ALT U/L 63 47 41   AST U/L 115* 86* 66*     Results from last 7 days   Lab Units 07/30/22  0521   MAGNESIUM mg/dL 2 4     Results from last 7 days   Lab Units 07/30/22  0521   PHOSPHORUS mg/dL 3 5      Results from last 7 days   Lab Units 08/02/22  0421 08/01/22  2152 08/01/22  1354 07/31/22  0922 07/31/22  0252   INR   --   --   --   --  1 02   PTT seconds 74* 85* 52*   < > 21*    < > = values in this interval not displayed  ABG:       Micro:         IMAGING & DIAGNOSTIC TESTING     Radiology Results: I have personally reviewed pertinent reports  No results found  Other Diagnostic Testing: I have personally reviewed pertinent reports      ACTIVE MEDICATIONS     Current Facility-Administered Medications   Medication Dose Route Frequency    acetaminophen (TYLENOL) tablet 975 mg  975 mg Oral Q8H [facility]    bacitracin topical ointment 1 small application  1 small application Topical Daily    docusate sodium (COLACE) capsule 100 mg  100 mg Oral BID    heparin (porcine) 25,000 units in 0 45% NaCl 250 mL infusion (premix)  3-30 Units/kg/hr (Order-Specific) Intravenous Titrated    heparin (porcine) injection 3,400 Units  3,400 Units Intravenous Q1H PRN    heparin (porcine) injection 6,800 Units  6,800 Units Intravenous Q1H PRN    hydrochlorothiazide (HYDRODIURIL) tablet 25 mg  25 mg Oral QAM    metoprolol succinate (TOPROL-XL) 24 hr tablet 12 5 mg  12 5 mg Oral QAM    ondansetron (ZOFRAN) injection 4 mg  4 mg Intravenous Q4H PRN    oxyCODONE (ROXICODONE) IR tablet 5 mg  5 mg Oral Q4H PRN    pantoprazole (PROTONIX) EC tablet 40 mg  40 mg Oral Early Morning    polyethylene glycol (MIRALAX) packet 17 g  17 g Oral Daily    potassium chloride (K-DUR,KLOR-CON) CR tablet 20 mEq  20 mEq Oral QAM    predniSONE tablet 40 mg  40 mg Oral Daily    traMADol (ULTRAM) tablet 50 mg  50 mg Oral Q6H [address]       VTE Pharmacologic Prophylaxis: Heparin gtt  VTE Mechanical Prophylaxis: sequential compression device       Disclaimer: Portions of the record may have been created with voice recognition software  Occasional wrong word or "sound a like" substitutions may have occurred due to the inherent limitations of voice recognition software  Careful consideration should be taken to recognize, using context, where substitutions have occurred      Moy Durham MD   Pulmonary and Critical Care Fellow, PGY-4  [facility]

## 2022-08-02 NOTE — ASSESSMENT & PLAN NOTE
· Follows with Hematology, Dr Jessica Hobson, as outpatient  · With warm and cold antibody  · LDH elevated at 1,077  · Sherry test positive  · Retic count elevated  · Continue transfusion support to keep hb above 7 0  · Received retuximab yesterday, monitor for response  · Continue prednisone 40mg

## 2022-08-02 NOTE — TELEPHONE ENCOUNTER
Soft Intake Form   Patient Details   Lin Santos     1955     Reason For Appointment   Who is Calling? Cheryl Starr   If not patient, Name? NA    DID YOU CONFIRM INSURANCE WITH PATIENT? Routed to Finance   Who is the Referring Doctor? Dr Marshall Camejo   What is the diagnosis? Malignant melanoma of RLE   Has this diagnosis been confirmed by a biopsy/surgery? If yes, what is the date it was done? Biopsy taken 7/29   Biopsy done at [place] [address]? If not, where was it done? Yes   Was imaging done, and was it done at [facility]? If not, where was it done? Yes-[facility]   Have you been seen by another Oncologist?  If so, who and where (name of facility, city and state) Yes- the patient also follows with Dr Randa Garza team for Autoimmune hemolytic anemia   For 2nd Opinions Only: Are you currently undergoing treatment, or are you scheduled to start treatment? If yes, name of facility, city and state  NA   For "History Of" only: Have you completed treatment? NA   Have you had Genetic Testing done in the past?  If so, advise to bring test results to their visit Unknown   Record Gathering Information   Did you advise to have records faxed to [phone]? NA   Did you advise to have disks sent to the proper address with imaging? ("History of" Patients)  5 years of imaging for breast patients-Mammos, US etc NA   Scheduling Information   Did you send new patient paperwork? Email or mail? NA   What is the best call back number? (If the [facility] is calling, please use their number) NA   Miscellaneous Information      The patient has a HFU appointment with Dr Beckie Rogers in the [facility] office on 8/22 at [address] [place] [address]

## 2022-08-02 NOTE — ASSESSMENT & PLAN NOTE
· Likely multifactorial in the setting of anemia and PE  · CXR x 2 with no acute cardiopulmonary disease   · Echo revealed LVEF 70%, right ventricular systolic function mildly reduced, moderate to severe TVR  · Currently on mid flow oxygen  · Pulm following as below

## 2022-08-02 NOTE — PROGRESS NOTES
[address] [facility]  Progress Note - William Owen 1955, 75 y o  male MRN: [id number]  [facility] Encounter: [id number]  Primary Care Provider: Nish Wellington DO   Date and time admitted to hospital: 7/29/2022  5:58 AM    * Dyspnea  Assessment & Plan  · Patient with progressive dyspnea on exertion for the past 3 weeks prior to admission  · Likely multifactorial in the setting of anemia and PE  · Plan as per below     Pulmonary embolism with acute cor pulmonale (HCC)  Assessment & Plan  · History of DVT/PE, Portal vein thrombosis, maintained on Pradaxa as outpatient - per home med/rec in the computer and OP Heme/Onc note, patient on 75mg Pradax BID - unclear why on this dosing  ·  Both Surg/Onc and Heme/Onc cleared to resume Pradaxa 7/30/22  · However, patient was noted to have increasing oxygen requirements  · Pradaxa had been on hold for 2 days for surgical incision per patient  He was on SC heparin for DVT ppx  · Patient reports history of Xarelto failure in the past  · CTA chest PE study revealed RML PE with RRV:LV >0 9 concerning for right ventricular strain  · Echo revealed LVEF 70%, right ventricular systolic function mildy reduced, moderate-severe TVR  · Pulm following   · On heparin GTT  Heme/Onc recommended to continue the heparin GTT for a few days and then transition back to Pradaxa but at full dose (150mg BID)  · Per discussion with Heme/Onc, unclear timing of PE as patient presented with dyspnea for weeks but also had worsening anemia and his Pradaxa was held pre-op  · Less likely Pradaxa failure per Pulm given Pradaxa was on hold for 48 hours prior to surgery  · Not a candidate for endovascular therapy per IR    · Currently weaned from high flow to midflow oxygen, encouraged incentive spirometry, continue to wean off oxygen as tolerated    Autoimmune hemolytic anemia  Assessment & Plan  · Follows with Hematology, Dr Christiane Cha, as outpatient  · With warm and cold antibody  · LDH elevated at 1,077  · Sherry test positive  · Retic count elevated  · Continue transfusion support to keep hb above 7 0  · Received retuximab yesterday, monitor for response  · Continue prednisone 40mg    Leukocytosis  Assessment & Plan  · Patient has chronic leukocytosis  · With chronic steroid use, recent surgery, history of splenectomy, and PE     Elevated bilirubin  Assessment & Plan  · Total bilirubin 8 08  · Direct bilirubin 0 77  · With hemolytic anemia as above    Elevated serum creatinine  Assessment & Plan  · Creatinine is at baseline  · Monitor periodically while inpatient  · Avoid nephrotoxins and hypotension     Hypoxia  Assessment & Plan  · Likely multifactorial in the setting of anemia and PE  · CXR x 2 with no acute cardiopulmonary disease   · Echo revealed LVEF 70%, right ventricular systolic function mildly reduced, moderate to severe TVR  · Currently on mid flow oxygen  · Pulm following as below     Malignant melanoma of leg, right (HCC)  Assessment & Plan  · Status post Excision and sentinel lymph node biopsy 7/29/22  · Surgical Oncology is following      Essential hypertension  Assessment & Plan  · Acceptable BP  · Continue Toprol XL and HCTZ    Gastroesophageal reflux disease  Assessment & Plan  · Continue PPI          VTE Pharmacologic Prophylaxis:   Moderate Risk (Score 3-4) - Pharmacological DVT Prophylaxis Ordered: heparin drip  Patient Centered Rounds: I performed bedside rounds with nursing staff today  Discussions with Specialists or Other Care Team Provider: heme/onc    Education and Discussions with Family / Patient: Updated  (wife) via phone  Time Spent for Care: 30 minutes  More than 50% of total time spent on counseling and coordination of care as described above      Current Length of Stay: 4 day(s)  Current Patient Status: Inpatient   Certification Statement: The patient will continue to require additional inpatient hospital stay due to hemolytic anemia, PE on midflow oxygen  Discharge Plan: Anticipate discharge in 48-72 hrs to home  Code Status: Level 1 - Full Code    Subjective:   Reports that his breathing is improved  No new complaints except decreased appetite    Objective:     Vitals:   Temp (24hrs), Av 4 °F (36 9 °C), Min:97 3 °F (36 3 °C), Max:99 1 °F (37 3 °C)    Temp:  [97 3 °F (36 3 °C)-99 1 °F (37 3 °C)] 98 7 °F (37 1 °C)  HR:  [100-119] 100  Resp:  [16-20] 20  BP: (133-153)/(79-84) 146/80  SpO2:  [90 %-100 %] 98 %  Body mass index is 30 07 kg/m²  Input and Output Summary (last 24 hours): Intake/Output Summary (Last 24 hours) at 2022 1535  Last data filed at 2022 1513  Gross per 24 hour   Intake 530 ml   Output 0 ml   Net 530 ml       Physical Exam:   Physical Exam  Constitutional:       Appearance: Normal appearance  HENT:      Head: Normocephalic and atraumatic  Nose: Nose normal       Mouth/Throat:      Mouth: Mucous membranes are moist       Pharynx: Oropharynx is clear  Eyes:      Extraocular Movements: Extraocular movements intact  Cardiovascular:      Rate and Rhythm: Normal rate and regular rhythm  Pulmonary:      Breath sounds: No wheezing or rales  Abdominal:      General: There is no distension  Palpations: Abdomen is soft  Tenderness: There is no abdominal tenderness  Musculoskeletal:      Right lower leg: No edema  Left lower leg: No edema  Skin:     Findings: No rash  Neurological:      General: No focal deficit present  Mental Status: He is alert and oriented to person, place, and time     Psychiatric:         Mood and Affect: Mood normal          Behavior: Behavior normal           Additional Data:     Labs:  Results from last 7 days   Lab Units 22  0226 22  0703 22  0943   WBC Thousand/uL 23 27*   < > 22 62*   HEMOGLOBIN g/dL 6 6*   < > 7 7*   HEMATOCRIT % 18 9*   < > 24 0*   PLATELETS Thousands/uL 259   < > 498*   BANDS PCT %  --   --  3 LYMPHO PCT % 14   < > 11*   MONO PCT % 9   < > 8   EOS PCT % 0   < > 2    < > = values in this interval not displayed  Results from last 7 days   Lab Units 08/02/22  0226   SODIUM mmol/L 140   POTASSIUM mmol/L 3 4*   CHLORIDE mmol/L 107   CO2 mmol/L 26   BUN mg/dL 38*   CREATININE mg/dL 1 33*   ANION GAP mmol/L 7   CALCIUM mg/dL 8 8   ALBUMIN g/dL 3 2*   TOTAL BILIRUBIN mg/dL 8 53*   ALK PHOS U/L 91   ALT U/L 63   AST U/L 115*   GLUCOSE RANDOM mg/dL 114     Results from last 7 days   Lab Units 07/31/22  0252   INR  1 02                   Lines/Drains:  Invasive Devices  Report    Peripheral Intravenous Line  Duration           Peripheral IV 07/31/22 Left;Ventral (anterior) Forearm 2 days    Peripheral IV 08/01/22 Left Antecubital <1 day                      Imaging: No pertinent imaging reviewed      Recent Cultures (last 7 days):         Last 24 Hours Medication List:   Current Facility-Administered Medications   Medication Dose Route Frequency Provider Last Rate    acetaminophen  975 mg Oral [facility] Jonda Phoenix, PA-C      bacitracin  1 small application Topical Daily Samantha Urias PA-C      docusate sodium  100 mg Oral BID Seymour Pastor MD      heparin (porcine)  3-30 Units/kg/hr (Order-Specific) Intravenous Titrated Marilu VISHNU Kaur 22 Units/kg/hr (08/02/22 0527)    heparin (porcine)  3,400 Units Intravenous Q1H PRN Darrel Goltz, CRNP      heparin (porcine)  6,800 Units Intravenous Q1H PRN Darrel Goltz, CRNP      hydrochlorothiazide  25 mg Oral QAM Elisa Graham [place], [place]      metoprolol succinate  12 5 mg Oral QAM Elisa Graham [place], [place]      ondansetron  4 mg Intravenous Q4H PRN Jonda Phoenix, PA-C      oxyCODONE  5 mg Oral Q4H PRN Jonda Phoenix, PA-C      pantoprazole  40 mg Oral Early Morning Elisa Hector PA-C      polyethylene glycol  17 g Oral Daily Seymour Pastor MD      potassium chloride  20 mEq Oral QAM Adrienne French PA-C      predniSONE  40 mg Oral Daily Delgadotori [place], [place]      traMADol  50 mg Oral [facility] [place]          Today, Patient Was Seen By: Kassandra Sarkar MD    **Please Note: This note may have been constructed using a voice recognition system  **

## 2022-08-02 NOTE — PROGRESS NOTES
Oncology Progress Note  Rickey Alexander 75 y o  male MRN: [id number]  Unit/Bed#: [facility] 910-01 Encounter: [id number]      /80   Pulse 104   Temp 99 °F (37 2 °C)   Resp 18   Ht 5' 7" (1 702 m)   Wt 87 1 kg (192 lb)   SpO2 90%   BMI 30 07 kg/m²     Subjective:  He received rituximab without infusion reaction  1 unit of red blood cell transfusion was given  His hemoglobin tiana to 6 6 which was 6 2 yesterday  He is on heparin drip for pulmonary embolism  He is breathing is better  He has no complaint of pain  He still have some fatigue  Objective:    General Appearance:    Alert, oriented        Eyes:    PERRL   Ears:    Normal external ear canals, both ears   Nose:   Nares normal, septum midline   Throat:   Mucosa moist  Pharynx without injection  Neck:   Supple       Lungs:     Clear to auscultation bilaterally   Chest Wall:    No tenderness or deformity    Heart:    Regular rate and rhythm       Abdomen:     Soft, non-tender, bowel sounds +, no organomegaly           Extremities:   Extremities no cyanosis or edema       Skin:   no rash or icterus      Lymph nodes:   Cervical, supraclavicular, and axillary nodes normal   Neurologic:   CNII-XII intact, normal strength, sensation and reflexes     throughout        Recent Results (from the past 48 hour(s))   APTT    Collection Time: 07/31/22  3:59 PM   Result Value Ref Range    PTT 65 (H) 23 - 37 seconds   CBC    Collection Time: 08/01/22  5:40 AM   Result Value Ref Range    WBC 25 63 (H) 4 31 - 10 16 Thousand/uL    RBC 1 14 (L) 3 88 - 5 62 Million/uL    Hemoglobin 6 2 (LL) 12 0 - 17 0 g/dL    Hematocrit 18 0 (L) 36 5 - 49 3 %     (H) 82 - 98 fL    MCH 54 4 (H) 26 8 - 34 3 pg    MCHC 34 4 31 4 - 37 4 g/dL    RDW 29 1 (H) 11 6 - 15 1 %    Platelets 590 815 - 978 Thousands/uL    MPV 10 4 8 9 - 12 7 fL   Comprehensive metabolic panel    Collection Time: 08/01/22  5:40 AM   Result Value Ref Range    Sodium 141 135 - 147 mmol/L    Potassium 3 3 (L) 3 5 - 5 3 mmol/L    Chloride 107 96 - 108 mmol/L    CO2 29 21 - 32 mmol/L    ANION GAP 5 4 - 13 mmol/L    BUN 37 (H) 5 - 25 mg/dL    Creatinine 1 36 (H) 0 60 - 1 30 mg/dL    Glucose 112 65 - 140 mg/dL    Calcium 8 6 8 3 - 10 1 mg/dL    Corrected Calcium 9 3 8 3 - 10 1 mg/dL    AST 86 (H) 5 - 45 U/L    ALT 47 12 - 78 U/L    Alkaline Phosphatase 78 46 - 116 U/L    Total Protein 5 4 (L) 6 4 - 8 4 g/dL    Albumin 3 1 (L) 3 5 - 5 0 g/dL    Total Bilirubin 8 08 (H) 0 20 - 1 00 mg/dL    eGFR 53 ml/min/1 73sq m   APTT    Collection Time: 08/01/22  5:40 AM   Result Value Ref Range    PTT 59 (H) 23 - 37 seconds   Type and screen    Collection Time: 08/01/22  9:27 AM   Result Value Ref Range    ABO Grouping A     Rh Factor Positive     Antibody Screen Positive     Specimen Expiration Date [id number]    APTT    Collection Time: 08/01/22  1:54 PM   Result Value Ref Range    PTT 52 (H) 23 - 37 seconds   APTT    Collection Time: 08/01/22  9:52 PM   Result Value Ref Range    PTT 85 (H) 23 - 37 seconds   CBC and differential    Collection Time: 08/02/22  2:26 AM   Result Value Ref Range    WBC 23 27 (H) 4 31 - 10 16 Thousand/uL    RBC 1 33 (L) 3 88 - 5 62 Million/uL    Hemoglobin 6 6 (LL) 12 0 - 17 0 g/dL    Hematocrit 18 9 (L) 36 5 - 49 3 %     (H) 82 - 98 fL    MCH 49 6 (H) 26 8 - 34 3 pg    MCHC 34 9 31 4 - 37 4 g/dL    MPV 11 0 8 9 - 12 7 fL    Platelets 640 961 - 186 Thousands/uL   Comprehensive metabolic panel    Collection Time: 08/02/22  2:26 AM   Result Value Ref Range    Sodium 140 135 - 147 mmol/L    Potassium 3 4 (L) 3 5 - 5 3 mmol/L    Chloride 107 96 - 108 mmol/L    CO2 26 21 - 32 mmol/L    ANION GAP 7 4 - 13 mmol/L    BUN 38 (H) 5 - 25 mg/dL    Creatinine 1 33 (H) 0 60 - 1 30 mg/dL    Glucose 114 65 - 140 mg/dL    Calcium 8 8 8 3 - 10 1 mg/dL    Corrected Calcium 9 4 8 3 - 10 1 mg/dL     (H) 5 - 45 U/L    ALT 63 12 - 78 U/L    Alkaline Phosphatase 91 46 - 116 U/L    Total Protein 5 6 (L) 6 4 - 8 4 g/dL Albumin 3 2 (L) 3 5 - 5 0 g/dL    Total Bilirubin 8 53 (H) 0 20 - 1 00 mg/dL    eGFR 54 ml/min/1 73sq m   Manual Differential(PHLEBS Do Not Order)    Collection Time: 08/02/22  2:26 AM   Result Value Ref Range    Segmented % 76 (H) 43 - 75 %    Lymphocytes % 14 14 - 44 %    Monocytes % 9 4 - 12 %    Eosinophils, % 0 0 - 6 %    Basophils % 0 0 - 1 %    Atypical Lymphocytes % 1 (H) <=0 %    Absolute Neutrophils 17 69 (H) 1 85 - 7 62 Thousand/uL    Lymphocytes Absolute 3 26 0 60 - 4 47 Thousand/uL    Monocytes Absolute 2 09 (H) 0 00 - 1 22 Thousand/uL    Eosinophils Absolute 0 00 0 00 - 0 40 Thousand/uL    Basophils Absolute 0 00 0 00 - 0 10 Thousand/uL    Total Counted      nRBC 141 (H) 0 - 2 /100 WBC    RBC Morphology Present     Anisocytosis Present     Basophilic Stippling Present     Wheeler-Wynot Bodies Present     Macrocytes Present     Pappenheimer Bodies Present     Poikilocytes Present     Polychromasia Present     Spherocytes Present     Platelet Estimate Adequate Adequate   APTT    Collection Time: 08/02/22  4:21 AM   Result Value Ref Range    PTT 74 (H) 23 - 37 seconds   Prepare Leukoreduced RBC: 1 Units, Irradiated, CMV Negative, Leukoreduced    Collection Time: 08/02/22  5:55 AM   Result Value Ref Range    Unit Product Code G5933R36     Unit Number [id number]     Unit ABO A     Unit RH POS     Crossmatch Compatible     Unit Dispense Status Presumed Trans     Unit Product Volume 350 mL   Prepare Leukoreduced RBC: 1 Units, CMV Negative, Irradiated, Leukoreduced    Collection Time: 08/02/22 10:47 AM   Result Value Ref Range    Unit Product Code I4630T71     Unit Number [id number]     Unit ABO O     Unit [facility] POS     Crossmatch Compatible     Unit Dispense Status Crossmatched     Unit Product Volume 350 mL         XR chest portable    Result Date: 7/31/2022  Narrative: CHEST INDICATION:   Hypoxemia   COMPARISON:  7/29/2022 EXAM PERFORMED/VIEWS:  XR CHEST PORTABLE FINDINGS: Cardiomediastinal silhouette appears unremarkable  The lungs are clear  No pneumothorax or pleural effusion  Osseous structures appear within normal limits for patient age  Impression: No acute cardiopulmonary disease  Workstation performed: [id number]     XR chest portable    Result Date: 7/29/2022  Narrative: CHEST INDICATION:   Shortness of breath  COMPARISON:  12/18/2020  EXAM PERFORMED/VIEWS:  XR CHEST PORTABLE FINDINGS:  Monitoring leads and clips project over the chest  Cardiomediastinal silhouette appears stable  The lungs are clear  No pneumothorax or pleural effusion  Degenerative changes  Left upper quadrant embolization coils  Impression: No acute cardiopulmonary disease  Workstation performed: [id number]     NM lymphatic melanoma    Result Date: 7/29/2022  Narrative: SENTINEL NODE LYMPHOSCINTIGRAPHY INDICATION:   Lower extremity melanoma  FINDINGS: 0 55 mCi Tc-99m sulfur colloid (0 6 cc volume) was administered intradermally in divided doses by Dr Apollo Lo in the region of the patients right lower extremity melanoma  Scintigraphic images were obtained over the right groin and right lower extremity in multiple projections  Single node is identified within the right groin Using scintigraphic guidance, the corresponding skin site was marked with an indelible marker  The patient was transferred to the operating room in satisfactory condition  Impression: 1  G[place]y lymph node localized to right inguinal region  Workstation performed: [id number]     CTA chest pe study    Result Date: 7/31/2022  Narrative: CTA - CHEST WITH IV CONTRAST - PULMONARY ANGIOGRAM INDICATION:   tachypnea, tachycardia, R/O PE  COMPARISON: 4/30/2022  TECHNIQUE: CTA examination of the chest was performed using angiographic technique according to a protocol specifically tailored to evaluate for pulmonary embolism    Axial, sagittal, and coronal 2D reformatted images were created from the source data and  submitted for interpretation  In addition, coronal 3D MIP postprocessing was performed on the acquisition scanner  Radiation dose length product (DLP) for this visit:  572 mGy-cm   This examination, like all CT scans performed in the [facility], was performed utilizing techniques to minimize radiation dose exposure, including the use of iterative reconstruction and automated exposure control  IV Contrast:  70 mL of iohexol (OMNIPAQUE)  FINDINGS: PULMONARY ARTERIAL TREE:  Scattered segmental/subsegmental pulmonary emboli are seen throughout the lung    LUNGS:  Scattered groundglass opacities suggests a small vessel or small airways process process  There is no tracheal or endobronchial lesion  PLEURA:  Unremarkable  HEART/GREAT VESSELS:  RV LV ratio is greater than 0 9 concerning for right ventricular strain    No thoracic aortic aneurysm  MEDIASTINUM AND BRENDA:  Unremarkable  CHEST WALL AND LOWER NECK:   Unremarkable  VISUALIZED STRUCTURES IN THE UPPER ABDOMEN:  Unremarkable  OSSEOUS STRUCTURES:  No acute fracture or destructive osseous lesion  Impression: Right middle lobe pulmonary embolism are noted    RV LV ratio is greater than 0 9 concerning for right ventricular strain     I personally discussed this study with Angie Conklin on 7/31/2022 at 2:17 AM  Workstation performed: [id number]     VAS lower limb venous duplex study, complete bilateral    Result Date: 8/1/2022  Narrative:  [facility] REPORT CLINICAL: Indications: PT C/O PE  He is currently receiving IV heparin  Physician wants to rule out B/L LE DVT  Operative History: No prior heart or vascular surgery Risk Factors The patient has history of HTN, current smoking, GERD, autoimmune hemolytic anemia, malignant melanoma right leg, COVID 19 virus (12/20), PE, portal vein thrombus, PTSD, and DVT  Height:  67 inches  Weight:  192 lbs     CONCLUSION:  Impression:  RIGHT LOWER LIMB: Based on color flow imaging, evaluation shows no evidence of acute deep vein thrombosis  The common femoral artery and sapheno-femoral junction were not evaluated secondary to lymph node removal at groin/pain  Tech note: A non-vascular structure measuring 3 47 cm was identified at the groin at the lymph node removal site  No evidence of superficial thrombophlebitis noted  Doppler evaluation shows a normal response to augmentation maneuvers  Popliteal, posterior tibial, and anterior tibial arterial Doppler waveforms are triphasic  LEFT LOWER LIMB: No evidence of acute or chronic deep vein thrombosis  No evidence of superficial thrombophlebitis noted  Doppler evaluation shows a normal response to augmentation maneuvers  Popliteal, posterior tibial, and anterior tibial arterial Doppler waveforms are triphasic  Technical findings were posted on EPIC  SIGNATURE: Electronically Signed by: Milind Grant on 2022-08-01 09:28:42 AM    US inguinal area    Addendum Date: 7/6/2022 Addendum:   ADDENDUM: Images were also obtained through the right groin/inguinal region with no evidence for enlarged lymph nodes  Result Date: 7/6/2022  Narrative: RIGHT ANTERIOR THIGH ULTRASOUND INDICATION:   C43 71: Malignant melanoma of right lower limb, including hip  COMPARISON:  Correlation is made with the prior CT study dated 4/30/2022  TECHNIQUE:   Real-time ultrasound of the right anterior thigh was performed with a linear transducer with both volumetric sweeps and still imaging techniques  FINDINGS:  There is a skin lesion demonstrating posterior shadowing measuring approximately 1 3 cm in width with a depth of 0 8 cm likely compatible with known melanoma  No adjacent enlarged lymph nodes are seen in this region  Impression: Skin lesion as above most likely compatible with known melanoma  No adjacent enlarged lymph nodes   Workstation performed: [id number]     US bedside procedure    Result Date: 7/31/2022  Narrative: 1 2 [id number] 2 323 [id number] 2    Echo complete w/ contrast if indicated    Result Date: 7/31/2022  Narrative: Charlie Monika  Left Ventricle: Left ventricular cavity size is normal  Wall thickness is mildly increased  The left ventricular ejection fraction is 70%  Systolic function is hyperdynamic  Wall motion is normal    Right Ventricle: Right ventricular cavity size is dilated  Systolic function is mildly reduced    Tricuspid Valve: There is moderate to severe regurgitation  The right ventricular systolic pressure is severely elevated  The estimated right ventricular systolic pressure is 80 mmHg  Assessment :  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

## 2022-08-02 NOTE — RESPIRATORY THERAPY NOTE
Resp care   08/02/22 9338   Respiratory Assessment   Assessment Type Assess only   General Appearance Awake; Alert   Resp Comments pt switched to midflow nc at 6 lpm, RN aware, pt is satting 96% at this time, will continue to monitor   O2 Device MFNC   Oxygen Therapy/Pulse Ox   O2 Device Mid flow nasal cannula   O2 Therapy Oxygen humidified   Nasal Cannula O2 Flow Rate (L/min) 6 L/min   Calculated FIO2 (%) - Nasal Cannula 44   SpO2 97 %   SpO2 Activity At Rest   Oxygen On/Off (RCP) Continued   Oximetry Probe Site Changed No   $ Pulse Oximetry Spot Check Charge Completed

## 2022-08-02 NOTE — ASSESSMENT & PLAN NOTE
· History of DVT/PE, Portal vein thrombosis, maintained on Pradaxa as outpatient - per home med/rec in the computer and OP Heme/Onc note, patient on 75mg Pradax BID - unclear why on this dosing  ·  Both Surg/Onc and Heme/Onc cleared to resume Pradaxa 7/30/22  · However, patient was noted to have increasing oxygen requirements  · Pradaxa had been on hold for 2 days for surgical incision per patient  He was on SC heparin for DVT ppx  · Patient reports history of Xarelto failure in the past  · CTA chest PE study revealed RML PE with RRV:LV >0 9 concerning for right ventricular strain  · Echo revealed LVEF 70%, right ventricular systolic function mildy reduced, moderate-severe TVR  · Pulm following   · On heparin GTT  Heme/Onc recommended to continue the heparin GTT for a few days and then transition back to Pradaxa but at full dose (150mg BID)  · Per discussion with Heme/Onc, unclear timing of PE as patient presented with dyspnea for weeks but also had worsening anemia and his Pradaxa was held pre-op  · Less likely Pradaxa failure per Pulm given Pradaxa was on hold for 48 hours prior to surgery  · Not a candidate for endovascular therapy per IR    · Currently weaned from high flow to midflow oxygen, encouraged incentive spirometry, continue to wean off oxygen as tolerated

## 2022-08-03 LAB
ABO GROUP BLD BPU: NORMAL
ANION GAP SERPL CALCULATED.3IONS-SCNC: 6 MMOL/L (ref 4–13)
APTT PPP: 84 SECONDS (ref 23–37)
BPU ID: NORMAL
BUN SERPL-MCNC: 43 MG/DL (ref 5–25)
CALCIUM SERPL-MCNC: 9.1 MG/DL (ref 8.3–10.1)
CHLORIDE SERPL-SCNC: 106 MMOL/L (ref 96–108)
CO2 SERPL-SCNC: 27 MMOL/L (ref 21–32)
CREAT SERPL-MCNC: 1.28 MG/DL (ref 0.6–1.3)
CROSSMATCH: NORMAL
GFR SERPL CREATININE-BSD FRML MDRD: 57 ML/MIN/1.73SQ M
GLUCOSE SERPL-MCNC: 103 MG/DL (ref 65–140)
HCT VFR BLD AUTO: 19.8 % (ref 36.5–49.3)
HGB BLD-MCNC: 6.5 G/DL (ref 12–17)
MCH RBC QN AUTO: 43.6 PG (ref 26.8–34.3)
MCHC RBC AUTO-ENTMCNC: 32.8 G/DL (ref 31.4–37.4)
MCV RBC AUTO: 132 FL (ref 82–98)
NRBC BLD AUTO-RTO: 67 /100 WBCS
PLATELET # BLD AUTO: 264 THOUSANDS/UL (ref 149–390)
PMV BLD AUTO: 11.3 FL (ref 8.9–12.7)
POTASSIUM SERPL-SCNC: 3.2 MMOL/L (ref 3.5–5.3)
RBC # BLD AUTO: 1.49 MILLION/UL (ref 3.88–5.62)
SODIUM SERPL-SCNC: 139 MMOL/L (ref 135–147)
UNIT DISPENSE STATUS: NORMAL
UNIT PRODUCT CODE: NORMAL
UNIT PRODUCT VOLUME: 350 ML
UNIT RH: NORMAL
WBC # BLD AUTO: 17.81 THOUSAND/UL (ref 4.31–10.16)

## 2022-08-03 PROCEDURE — 80048 BASIC METABOLIC PNL TOTAL CA: CPT | Performed by: INTERNAL MEDICINE

## 2022-08-03 PROCEDURE — 85027 COMPLETE CBC AUTOMATED: CPT | Performed by: INTERNAL MEDICINE

## 2022-08-03 PROCEDURE — P9058 RBC, L/R, CMV-NEG, IRRAD: HCPCS

## 2022-08-03 PROCEDURE — 99233 SBSQ HOSP IP/OBS HIGH 50: CPT | Performed by: INTERNAL MEDICINE

## 2022-08-03 PROCEDURE — 94760 N-INVAS EAR/PLS OXIMETRY 1: CPT

## 2022-08-03 PROCEDURE — 85730 THROMBOPLASTIN TIME PARTIAL: CPT | Performed by: INTERNAL MEDICINE

## 2022-08-03 RX ORDER — POTASSIUM CHLORIDE 20 MEQ/1
20 TABLET, EXTENDED RELEASE ORAL ONCE
Status: COMPLETED | OUTPATIENT
Start: 2022-08-03 | End: 2022-08-03

## 2022-08-03 RX ORDER — POTASSIUM CHLORIDE 20 MEQ/1
20 TABLET, EXTENDED RELEASE ORAL
Status: DISCONTINUED | OUTPATIENT
Start: 2022-08-03 | End: 2022-08-04

## 2022-08-03 RX ADMIN — POTASSIUM CHLORIDE 20 MEQ: 1500 TABLET, EXTENDED RELEASE ORAL at 09:33

## 2022-08-03 RX ADMIN — TRAMADOL HYDROCHLORIDE 50 MG: 50 TABLET, COATED ORAL at 00:02

## 2022-08-03 RX ADMIN — METOPROLOL SUCCINATE 12.5 MG: 25 TABLET, EXTENDED RELEASE ORAL at 09:33

## 2022-08-03 RX ADMIN — ACETAMINOPHEN 975 MG: 325 TABLET ORAL at 14:58

## 2022-08-03 RX ADMIN — TRAMADOL HYDROCHLORIDE 50 MG: 50 TABLET, COATED ORAL at 05:31

## 2022-08-03 RX ADMIN — BACITRACIN ZINC 1 SMALL APPLICATION: 500 OINTMENT TOPICAL at 09:33

## 2022-08-03 RX ADMIN — POTASSIUM CHLORIDE 20 MEQ: 1500 TABLET, EXTENDED RELEASE ORAL at 16:31

## 2022-08-03 RX ADMIN — HEPARIN SODIUM 22 UNITS/KG/HR: 10000 INJECTION, SOLUTION INTRAVENOUS at 23:04

## 2022-08-03 RX ADMIN — PREDNISONE 40 MG: 20 TABLET ORAL at 09:33

## 2022-08-03 RX ADMIN — PANTOPRAZOLE SODIUM 40 MG: 40 TABLET, DELAYED RELEASE ORAL at 05:31

## 2022-08-03 RX ADMIN — HEPARIN SODIUM 22 UNITS/KG/HR: 10000 INJECTION, SOLUTION INTRAVENOUS at 09:05

## 2022-08-03 RX ADMIN — HYDROCHLOROTHIAZIDE 25 MG: 25 TABLET ORAL at 09:33

## 2022-08-03 RX ADMIN — ACETAMINOPHEN 975 MG: 325 TABLET ORAL at 05:31

## 2022-08-03 RX ADMIN — POTASSIUM CHLORIDE 20 MEQ: 1500 TABLET, EXTENDED RELEASE ORAL at 12:47

## 2022-08-03 RX ADMIN — TRAMADOL HYDROCHLORIDE 50 MG: 50 TABLET, COATED ORAL at 12:47

## 2022-08-03 NOTE — ASSESSMENT & PLAN NOTE
· History of DVT/PE, Portal vein thrombosis, maintained on Pradaxa as outpatient - per home med/rec in the computer and OP Heme/Onc note, patient on 75mg Pradax BID - unclear why on this dosing  ·  Both Surg/Onc and Heme/Onc cleared to resume Pradaxa 7/30/22  · However, patient was noted to have increasing oxygen requirements  · Pradaxa had been on hold for 2 days for surgical incision per patient  He was on SC heparin for DVT ppx  · Patient reports history of Xarelto failure in the past  · CTA chest PE study revealed RML PE with RRV:LV >0 9 concerning for right ventricular strain  · Echo revealed LVEF 70%, right ventricular systolic function mildy reduced, moderate-severe TVR  · Pulm following   · On heparin GTT  Heme/Onc recommended to continue the heparin GTT for a few days and then transition back to Pradaxa but at full dose (150mg BID)  · Per discussion with Heme/Onc, unclear timing of PE as patient presented with dyspnea for weeks but also had worsening anemia and his Pradaxa was held pre-op  · Less likely Pradaxa failure per Pulm given Pradaxa was on hold for 48 hours prior to surgery  · Not a candidate for endovascular therapy per IR    · Currently weaned from high flow to 5 liters, encouraged incentive spirometry, continue to wean off oxygen as tolerated

## 2022-08-03 NOTE — ASSESSMENT & PLAN NOTE
· Follows with Hematology, Dr Stevo Mahmood, as outpatient  · With warm and cold antibody  · LDH elevated at 1,077  · Sherry test positive  · Retic count elevated  · Continue transfusion support to keep hb above 7 0  · Received retuximab 8/1, monitor for response  · Continue prednisone 40mg

## 2022-08-03 NOTE — PLAN OF CARE
Problem: MOBILITY - ADULT  Goal: Maintain or return to baseline ADL function  Description: INTERVENTIONS:  -  Assess patient's ability to carry out ADLs; assess patient's baseline for ADL function and identify physical deficits which impact ability to perform ADLs (bathing, care of mouth/teeth, toileting, grooming, dressing, etc )  - Assess/evaluate cause of self-care deficits   - Assess range of motion  - Assess patient's mobility; develop plan if impaired  - Assess patient's need for assistive devices and provide as appropriate  - Encourage maximum independence but intervene and supervise when necessary  - Involve family in performance of ADLs  - Assess for home care needs following discharge   - Consider OT consult to assist with ADL evaluation and planning for discharge  - Provide patient education as appropriate  Outcome: Progressing  Goal: Maintains/Returns to pre admission functional level  Description: INTERVENTIONS:  - Perform BMAT or MOVE assessment daily    - Set and communicate daily mobility goal to care team and patient/family/caregiver     - Collaborate with rehabilitation services on mobility goals if consulted  -Outcome: Progressing     Problem: PAIN - ADULT  Goal: Verbalizes/displays adequate comfort level or baseline comfort level  Description: Interventions:  - Encourage patient to monitor pain and request assistance  - Assess pain using appropriate pain scale  - Administer analgesics based on type and severity of pain and evaluate response  - Implement non-pharmacological measures as appropriate and evaluate response  - Consider cultural and social influences on pain and pain management  - Notify physician/advanced practitioner if interventions unsuccessful or patient reports new pain  Outcome: Progressing     Problem: INFECTION - ADULT  Goal: Absence or prevention of progression during hospitalization  Description: INTERVENTIONS:  - Assess and monitor for signs and symptoms of infection  - Monitor lab/diagnostic results  - Monitor all insertion sites, i e  indwelling lines, tubes, and drains  - Monitor endotracheal if appropriate and nasal secretions for changes in amount and color  - [place] appropriate cooling/warming therapies per order  - Administer medications as ordered  - Instruct and encourage patient and family to use good hand hygiene technique  - Identify and instruct in appropriate isolation precautions for identified infection/condition  Outcome: Progressing  Goal: Absence of fever/infection during neutropenic period  Description: INTERVENTIONS:  - Monitor WBC    Outcome: Progressing     Problem: SAFETY ADULT  Goal: Maintain or return to baseline ADL function  Description: INTERVENTIONS:  -  Assess patient's ability to carry out ADLs; assess patient's baseline for ADL function and identify physical deficits which impact ability to perform ADLs (bathing, care of mouth/teeth, toileting, grooming, dressing, etc )  - Assess/evaluate cause of self-care deficits   - Assess range of motion  - Assess patient's mobility; develop plan if impaired  - Assess patient's need for assistive devices and provide as appropriate  - Encourage maximum independence but intervene and supervise when necessary  - Involve family in performance of ADLs  - Assess for home care needs following discharge   - Consider OT consult to assist with ADL evaluation and planning for discharge  - Provide patient education as appropriate  Outcome: Progressing  Goal: Maintains/Returns to pre admission functional level  Description: INTERVENTIONS:  - Perform BMAT or MOVE assessment daily    - Set and communicate daily mobility goal to care team and patient/family/caregiver     - Collaborate with rehabilitation services on mobility goals if consulted  Outcome: Progressing  Goal: Patient will remain free of falls  Description: INTERVENTIONS:  - Educate patient/family on patient safety including physical limitations  - Instruct patient to call for assistance with activity   - Consult OT/PT to assist with strengthening/mobility   - Keep Call bell within reach  - Keep bed low and locked with side rails adjusted as appropriate  - Keep care items and personal belongings within reach  - Initiate and maintain comfort rounds  - Make Fall Risk Sign visible to staff  - Apply yellow socks and bracelet for high fall risk patients  - Consider moving patient to room near nurses station  Outcome: Progressing     Problem: DISCHARGE PLANNING  Goal: Discharge to home or other facility with appropriate resources  Description: INTERVENTIONS:  - Identify barriers to discharge w/patient and caregiver  - Arrange for needed discharge resources and transportation as appropriate  - Identify discharge learning needs (meds, wound care, etc )  - Arrange for interpretive services to assist at discharge as needed  - Refer to Case Management Department for coordinating discharge planning if the patient needs post-hospital services based on physician/advanced practitioner order or complex needs related to functional status, cognitive ability, or social support system  Outcome: Progressing     Problem: Knowledge Deficit  Goal: Patient/family/caregiver demonstrates understanding of disease process, treatment plan, medications, and discharge instructions  Description: Complete learning assessment and assess knowledge base    Interventions:  - Provide teaching at level of understanding  - Provide teaching via preferred learning methods  Outcome: Progressing     Problem: Potential for Falls  Goal: Patient will remain free of falls  Description: INTERVENTIONS:  - Educate patient/family on patient safety including physical limitations  - Instruct patient to call for assistance with activity   - Consult OT/PT to assist with strengthening/mobility   - Keep Call bell within reach  - Keep bed low and locked with side rails adjusted as appropriate  - Keep care items and personal belongings within reach  - Initiate and maintain comfort rounds  - Make Fall Risk Sign visible to staff  -Outcome: Progressing

## 2022-08-03 NOTE — PLAN OF CARE
Problem: Knowledge Deficit  Goal: Patient/family/caregiver demonstrates understanding of disease process, treatment plan, medications, and discharge instructions  Description: Complete learning assessment and assess knowledge base    Interventions:  - Provide teaching at level of understanding  - Provide teaching via preferred learning methods  Outcome: Progressing   Problem: PAIN - ADULT  Goal: Verbalizes/displays adequate comfort level or baseline comfort level  Description: Interventions:  - Encourage patient to monitor pain and request assistance  - Assess pain using appropriate pain scale  - Administer analgesics based on type and severity of pain and evaluate response  - Implement non-pharmacological measures as appropriate and evaluate response  - Consider cultural and social influences on pain and pain management  - Notify physician/advanced practitioner if interventions unsuccessful or patient reports new pain  Outcome: Progressing

## 2022-08-03 NOTE — PROGRESS NOTES
[address] [facility]  Progress Note - Kee Nicole 1955, 75 y o  male MRN: [id number]  Unit/Bed#: [facility] 910-01 Encounter: [id number]  Primary Care Provider: Irving Luis DO   Date and time admitted to hospital: 7/29/2022  5:58 AM    * Dyspnea  Assessment & Plan  · Patient with progressive dyspnea on exertion for the past 3 weeks prior to admission  · Likely multifactorial in the setting of anemia and PE  · Plan as per below     Pulmonary embolism with acute cor pulmonale (HCC)  Assessment & Plan  · History of DVT/PE, Portal vein thrombosis, maintained on Pradaxa as outpatient - per home med/rec in the computer and OP Heme/Onc note, patient on 75mg Pradax BID - unclear why on this dosing  ·  Both Surg/Onc and Heme/Onc cleared to resume Pradaxa 7/30/22  · However, patient was noted to have increasing oxygen requirements  · Pradaxa had been on hold for 2 days for surgical incision per patient  He was on SC heparin for DVT ppx  · Patient reports history of Xarelto failure in the past  · CTA chest PE study revealed RML PE with RRV:LV >0 9 concerning for right ventricular strain  · Echo revealed LVEF 70%, right ventricular systolic function mildy reduced, moderate-severe TVR  · Pulm following   · On heparin GTT  Heme/Onc recommended to continue the heparin GTT for a few days and then transition back to Pradaxa but at full dose (150mg BID)  · Per discussion with Heme/Onc, unclear timing of PE as patient presented with dyspnea for weeks but also had worsening anemia and his Pradaxa was held pre-op  · Less likely Pradaxa failure per Pulm given Pradaxa was on hold for 48 hours prior to surgery  · Not a candidate for endovascular therapy per IR    · Currently weaned from high flow to 5 liters, encouraged incentive spirometry, continue to wean off oxygen as tolerated    Autoimmune hemolytic anemia  Assessment & Plan  · Follows with Hematology, Dr Lizette Ferguson, as outpatient  · With warm and cold antibody  · LDH elevated at 1,077  · Sherry test positive  · Retic count elevated  · Continue transfusion support to keep hb above 7 0  · Received retuximab , monitor for response  · Continue prednisone 40mg    Leukocytosis  Assessment & Plan  · Patient has chronic leukocytosis  · With chronic steroid use, recent surgery, history of splenectomy, and PE     Elevated bilirubin  Assessment & Plan  · Total bilirubin 8 08  · Direct bilirubin 0 77  · With hemolytic anemia as above    Elevated serum creatinine  Assessment & Plan  · Creatinine is at baseline  · Monitor periodically while inpatient  · Avoid nephrotoxins and hypotension     Malignant melanoma of leg, right [facility])  Assessment & Plan  · Status post Excision and sentinel lymph node biopsy 22  · Surgical Oncology is following      Essential hypertension  Assessment & Plan  · Acceptable BP  · Continue Toprol XL and HCTZ    Gastroesophageal reflux disease  Assessment & Plan  · Continue PPI          VTE Pharmacologic Prophylaxis: VTE Score: 3 Moderate Risk (Score 3-4) - Pharmacological DVT Prophylaxis Ordered: heparin drip  Patient Centered Rounds: I performed bedside rounds with nursing staff today  Discussions with Specialists or Other Care Team Provider: hematology    Education and Discussions with Family / Patient: Patient declined call to   Time Spent for Care: 30 minutes  More than 50% of total time spent on counseling and coordination of care as described above  Current Length of Stay: 5 day(s)  Current Patient Status: Inpatient   Certification Statement: The patient will continue to require additional inpatient hospital stay due to hemolysis  Discharge Plan: Anticipate discharge in >72 hrs to home  Code Status: Level 1 - Full Code    Subjective:   Reports the breathing is better today  Mild productive cough  Moved his bowels  Tolerating diet      Objective:     Vitals:   Temp (24hrs), Av 6 °F (36 4 °C), Min:97 3 °F (36 3 °C), Max:97 9 °F (36 6 °C)    Temp:  [97 3 °F (36 3 °C)-97 9 °F (36 6 °C)] 97 4 °F (36 3 °C)  HR:  [] 89  Resp:  [18-20] 20  BP: (129-165)/(84-91) 142/86  SpO2:  [77 %-99 %] 97 %  Body mass index is 30 07 kg/m²  Input and Output Summary (last 24 hours): Intake/Output Summary (Last 24 hours) at 8/3/2022 1600  Last data filed at 8/2/2022 1900  Gross per 24 hour   Intake 1361 05 ml   Output --   Net 1361 05 ml       Physical Exam:   Physical Exam  Constitutional:       Appearance: Normal appearance  HENT:      Head: Normocephalic and atraumatic  Nose: Nose normal       Mouth/Throat:      Mouth: Mucous membranes are moist       Pharynx: Oropharynx is clear  Eyes:      Extraocular Movements: Extraocular movements intact  Cardiovascular:      Rate and Rhythm: Normal rate and regular rhythm  Pulmonary:      Effort: Pulmonary effort is normal       Breath sounds: No wheezing or rales  Abdominal:      General: There is no distension  Palpations: Abdomen is soft  Musculoskeletal:      Right lower leg: No edema  Left lower leg: No edema  Skin:     General: Skin is warm and dry  Comments: Incision site is clean and dry   Neurological:      General: No focal deficit present  Mental Status: He is alert and oriented to person, place, and time     Psychiatric:         Mood and Affect: Mood normal          Behavior: Behavior normal           Additional Data:     Labs:  Results from last 7 days   Lab Units 08/03/22  0610 08/02/22  0226   WBC Thousand/uL 17 81* 23 27*   HEMOGLOBIN g/dL 6 5* 6 6*   HEMATOCRIT % 19 8* 18 9*   PLATELETS Thousands/uL 264 259   LYMPHO PCT %  --  14   MONO PCT %  --  9   EOS PCT %  --  0     Results from last 7 days   Lab Units 08/03/22  0610 08/02/22  0226   SODIUM mmol/L 139 140   POTASSIUM mmol/L 3 2* 3 4*   CHLORIDE mmol/L 106 107   CO2 mmol/L 27 26   BUN mg/dL 43* 38*   CREATININE mg/dL 1 28 1 33*   ANION GAP mmol/L 6 7   CALCIUM mg/dL 9 1 8 8 ALBUMIN g/dL  --  3 2*   TOTAL BILIRUBIN mg/dL  --  8 53*   ALK PHOS U/L  --  91   ALT U/L  --  63   AST U/L  --  115*   GLUCOSE RANDOM mg/dL 103 114     Results from last 7 days   Lab Units 07/31/22  0252   INR  1 02                   Lines/Drains:  Invasive Devices  Report    Peripheral Intravenous Line  Duration           Peripheral IV 07/31/22 Left;Ventral (anterior) Forearm 3 days    Peripheral IV 08/01/22 Left Antecubital 1 day    Peripheral IV 08/03/22 Right;Lateral Wrist <1 day                      Imaging: No pertinent imaging reviewed      Recent Cultures (last 7 days):         Last 24 Hours Medication List:   Current Facility-Administered Medications   Medication Dose Route Frequency Provider Last Rate    acetaminophen  975 mg Oral [facility] Samantha Gómez PA-C      bacitracin  1 small application Topical Daily Samantha Urias PA-C      docusate sodium  100 mg Oral BID Samara Guthrie MD      heparin (porcine)  3-30 Units/kg/hr (Order-Specific) Intravenous Titrated Avanell VISHNU Bernal 22 Units/kg/hr (08/03/22 0905)    heparin (porcine)  3,400 Units Intravenous Q1H PRN VISHNU Bobo      heparin (porcine)  6,800 Units Intravenous Q1H PRN VISHNU Bobo      hydrochlorothiazide  25 mg Oral QAM [place], [place]      metoprolol succinate  12 5 mg Oral QAM [place], [place]      ondansetron  4 mg Intravenous Q4H PRN Samantha Gómez PA-C      oxyCODONE  5 mg Oral Q4H PRN Samantha Gómez PA-C      pantoprazole  40 mg Oral Early Morning Nancy Hector PA-C      polyethylene glycol  17 g Oral Daily Samara Guthrie MD      potassium chloride  20 mEq Oral TID With Meals Susan Peralta MD      predniSONE  40 mg Oral Daily Ria Corrales PA-C      traMADol  50 mg Oral Q6H [address] Samantha Gómez PA-C          Today, Patient Was Seen By: Mikey Forman MD    **Please Note: This note may have been constructed using a voice recognition system  **

## 2022-08-04 LAB
ANION GAP SERPL CALCULATED.3IONS-SCNC: 6 MMOL/L (ref 4–13)
APTT PPP: 61 SECONDS (ref 23–37)
BUN SERPL-MCNC: 34 MG/DL (ref 5–25)
CALCIUM SERPL-MCNC: 9.3 MG/DL (ref 8.3–10.1)
CHLORIDE SERPL-SCNC: 106 MMOL/L (ref 96–108)
CO2 SERPL-SCNC: 28 MMOL/L (ref 21–32)
CREAT SERPL-MCNC: 1.29 MG/DL (ref 0.6–1.3)
GFR SERPL CREATININE-BSD FRML MDRD: 56 ML/MIN/1.73SQ M
GLUCOSE SERPL-MCNC: 116 MG/DL (ref 65–140)
HCT VFR BLD AUTO: 23.6 % (ref 36.5–49.3)
HGB BLD-MCNC: 8.4 G/DL (ref 12–17)
MCH RBC QN AUTO: 46.2 PG (ref 26.8–34.3)
MCHC RBC AUTO-ENTMCNC: 35.6 G/DL (ref 31.4–37.4)
MCV RBC AUTO: 130 FL (ref 82–98)
PLATELET # BLD AUTO: 272 THOUSANDS/UL (ref 149–390)
PMV BLD AUTO: 11.1 FL (ref 8.9–12.7)
POTASSIUM SERPL-SCNC: 3.1 MMOL/L (ref 3.5–5.3)
RBC # BLD AUTO: 1.82 MILLION/UL (ref 3.88–5.62)
SODIUM SERPL-SCNC: 140 MMOL/L (ref 135–147)
WBC # BLD AUTO: 16.77 THOUSAND/UL (ref 4.31–10.16)

## 2022-08-04 PROCEDURE — 80053 COMPREHEN METABOLIC PANEL: CPT | Performed by: INTERNAL MEDICINE

## 2022-08-04 PROCEDURE — 97163 PT EVAL HIGH COMPLEX 45 MIN: CPT

## 2022-08-04 PROCEDURE — 85025 COMPLETE CBC W/AUTO DIFF WBC: CPT | Performed by: PHYSICIAN ASSISTANT

## 2022-08-04 PROCEDURE — 97166 OT EVAL MOD COMPLEX 45 MIN: CPT

## 2022-08-04 PROCEDURE — 99232 SBSQ HOSP IP/OBS MODERATE 35: CPT | Performed by: INTERNAL MEDICINE

## 2022-08-04 PROCEDURE — 85730 THROMBOPLASTIN TIME PARTIAL: CPT | Performed by: INTERNAL MEDICINE

## 2022-08-04 PROCEDURE — 80048 BASIC METABOLIC PNL TOTAL CA: CPT | Performed by: PHYSICIAN ASSISTANT

## 2022-08-04 RX ORDER — DABIGATRAN ETEXILATE 150 MG/1
150 CAPSULE ORAL EVERY 12 HOURS SCHEDULED
Status: DISCONTINUED | OUTPATIENT
Start: 2022-08-04 | End: 2022-08-05 | Stop reason: HOSPADM

## 2022-08-04 RX ORDER — SULFAMETHOXAZOLE AND TRIMETHOPRIM 800; 160 MG/1; MG/1
1 TABLET ORAL 3 TIMES WEEKLY
Status: DISCONTINUED | OUTPATIENT
Start: 2022-08-05 | End: 2022-08-05 | Stop reason: HOSPADM

## 2022-08-04 RX ORDER — DABIGATRAN ETEXILATE 150 MG/1
150 CAPSULE ORAL EVERY 12 HOURS SCHEDULED
Qty: 60 CAPSULE | Refills: 0 | Status: SHIPPED | OUTPATIENT
Start: 2022-08-04

## 2022-08-04 RX ORDER — PREDNISONE 20 MG/1
40 TABLET ORAL DAILY
Qty: 60 TABLET | Refills: 0 | Status: SHIPPED | OUTPATIENT
Start: 2022-08-04

## 2022-08-04 RX ORDER — SULFAMETHOXAZOLE AND TRIMETHOPRIM 800; 160 MG/1; MG/1
1 TABLET ORAL 3 TIMES WEEKLY
Qty: 12 TABLET | Refills: 0 | Status: SHIPPED | OUTPATIENT
Start: 2022-08-05 | End: 2025-04-30

## 2022-08-04 RX ORDER — POTASSIUM CHLORIDE 20 MEQ/1
40 TABLET, EXTENDED RELEASE ORAL
Status: DISCONTINUED | OUTPATIENT
Start: 2022-08-04 | End: 2022-08-05 | Stop reason: HOSPADM

## 2022-08-04 RX ADMIN — PREDNISONE 40 MG: 20 TABLET ORAL at 08:38

## 2022-08-04 RX ADMIN — TRAMADOL HYDROCHLORIDE 50 MG: 50 TABLET, COATED ORAL at 12:23

## 2022-08-04 RX ADMIN — METOPROLOL SUCCINATE 12.5 MG: 25 TABLET, EXTENDED RELEASE ORAL at 08:38

## 2022-08-04 RX ADMIN — HEPARIN SODIUM 22 UNITS/KG/HR: 10000 INJECTION, SOLUTION INTRAVENOUS at 13:04

## 2022-08-04 RX ADMIN — ACETAMINOPHEN 975 MG: 325 TABLET ORAL at 14:08

## 2022-08-04 RX ADMIN — ACETAMINOPHEN 975 MG: 325 TABLET ORAL at 06:13

## 2022-08-04 RX ADMIN — BACITRACIN ZINC 1 SMALL APPLICATION: 500 OINTMENT TOPICAL at 08:38

## 2022-08-04 RX ADMIN — POTASSIUM CHLORIDE 40 MEQ: 1500 TABLET, EXTENDED RELEASE ORAL at 12:23

## 2022-08-04 RX ADMIN — HYDROCHLOROTHIAZIDE 25 MG: 25 TABLET ORAL at 08:38

## 2022-08-04 RX ADMIN — TRAMADOL HYDROCHLORIDE 50 MG: 50 TABLET, COATED ORAL at 06:13

## 2022-08-04 RX ADMIN — DABIGATRAN ETEXILATE MESYLATE 150 MG: 150 CAPSULE ORAL at 21:58

## 2022-08-04 RX ADMIN — TRAMADOL HYDROCHLORIDE 50 MG: 50 TABLET, COATED ORAL at 17:27

## 2022-08-04 RX ADMIN — POTASSIUM CHLORIDE 40 MEQ: 1500 TABLET, EXTENDED RELEASE ORAL at 17:27

## 2022-08-04 RX ADMIN — POTASSIUM CHLORIDE 20 MEQ: 1500 TABLET, EXTENDED RELEASE ORAL at 08:38

## 2022-08-04 RX ADMIN — PANTOPRAZOLE SODIUM 40 MG: 40 TABLET, DELAYED RELEASE ORAL at 06:13

## 2022-08-04 NOTE — PLAN OF CARE
Problem: PHYSICAL THERAPY ADULT  Goal: Performs mobility at highest level of function for planned discharge setting  See evaluation for individualized goals  Description: Treatment/Interventions: ADL retraining, Functional transfer training, LE strengthening/ROM, Patient/family training, Bed mobility, Gait training, Spoke to nursing, OT, Spoke to case management  Equipment Recommended:  (TBD)       See flowsheet documentation for full assessment, interventions and recommendations  Note: Prognosis: Good  Problem List: Decreased strength, Decreased endurance, Impaired balance, Decreased mobility  Assessment: Pt seen for high complexity PT evaluation  Pt with active PT eval/treat and up as tolerated and ambulate pt orders  Pt is a 75 y o  male who was [facility] to [facility] on 7/29/2022 with dyspnea + R middle lobe PE with right heart strain s/p wide excision R medical thigh + biopsy of lymph nodes  Pt's current dx/ problem list include: PE with acute cor pulmonale, GERD, anemia, HTN, malignant neoplasm of RLE, elevated serum creatinine, elevated bilirubin  Comorbidities affecting pt's physical performance at time of assessment are as follows:  has a past medical history of Anemia (11/2/2016), Anxiety, Arthritis, Autoimmune hemolytic anemia ([address] ), Claustrophobia, COVID (12/2020), DVT (deep venous thrombosis) ([address] ), GERD (gastroesophageal reflux disease), Hearing loss, right, Hemolytic anemia ([address] ), History of transfusion, Hypertension, Malignant melanoma of leg, right ([address] ) (7/1/2022), Palpitation, Portal vein thrombosis, PTSD (post-traumatic stress disorder), Pulmonary emboli (HCC), and Tobacco abuse  Personal factors affecting pt at time of initial examination include: steps to enter environment, advanced age, past experience, inability to perform IADLs, inability to perform ADLs, inability to ambulate household distances   Due to acute medical issues, ongoing medical workup for primary dx; pain, fall risk, increased reliance on more restrictive AD compared to baseline;  decreased activity tolerance compared to baseline, increased assistance needed from caregiver at current time, multiple lines, decline in overall functional mobility status; health management issues; note unstable clinical picture (high complexity)  At baseline, pt resides with spouse in bi-level home with 3+ 7 SIDRA vs 0+ 14 steps and was independent prior to hospital admission  Currently, upon initial examination, pt  is requiring S level A for bed mobility skills; S level for functional transfers and CGA progressing to close S level for ambulation without AD  Currently, pt presents functioning below baseline and with overall mobility deficits 2* to: decreased LE strength/AROM; limited flexibility;  generalized weakness/ deconditioning; decreased endurance; decreased activity tolerance; decreased coordination; impaired balance; gait deviations; SOB/LOWE; fatigue; multiple lines; as well as : weakness, decreased ROM, impaired balance, decreased endurance, pain, decreased activity tolerance, decreased functional mobility tolerance and fall risk  Pt currently at increased risk for falls  At the end of evaluation, pt was left sitting OOB in recliner chair with all needs in reach  Pt would benefit from continued skilled PT services while in hospital to address remaining limitations and maximize functional potential  PT to continue to follow pt and recommends home with OPPT and increased social support  The patient's AM-PAC Basic Mobility Inpatient Short Form Raw Score is 17  A Raw score of greater than 16 suggests the patient may benefit from discharge to home  Please also refer to the recommendation of the Physical Therapist for safe discharge planning  PT Discharge Recommendation: Home with outpatient rehabilitation (+ family support as needed)    See flowsheet documentation for full assessment

## 2022-08-04 NOTE — ASSESSMENT & PLAN NOTE
· Follows with Hematology, Dr Marci Newsome, as outpatient  · With warm and cold antibody  · LDH elevated at 1,077  · Sherry test positive  · Retic count elevated  · Continue transfusion support to keep hb above 7 0  · Received retuximab 8/1, monitor for response  · Continue prednisone 40mg

## 2022-08-04 NOTE — ASSESSMENT & PLAN NOTE
· History of DVT/PE, Portal vein thrombosis, maintained on Pradaxa as outpatient - per home med/rec in the computer and OP Heme/Onc note, patient on 75mg Pradax BID - unclear why on this dosing  ·  Both Surg/Onc and Heme/Onc cleared to resume Pradaxa 7/30/22  · However, patient was noted to have increasing oxygen requirements  · Pradaxa had been on hold for 2 days for surgical incision per patient  He was on SC heparin for DVT ppx  · Patient reports history of Xarelto failure in the past  · CTA chest PE study revealed RML PE with RRV:LV >0 9 concerning for right ventricular strain  · Echo revealed LVEF 70%, right ventricular systolic function mildy reduced, moderate-severe TVR  · Pulm following   · On heparin GTT  Heme/Onc recommended to continue the heparin GTT for a few days and then transition back to Pradaxa but at full dose (150mg BID)  · Per discussion with Heme/Onc, unclear timing of PE as patient presented with dyspnea for weeks but also had worsening anemia and his Pradaxa was held pre-op  · Less likely Pradaxa failure per Pulm given Pradaxa was on hold for 48 hours prior to surgery  · Not a candidate for endovascular therapy per IR    · Currently weaned from high flow to 2 liters, encouraged incentive spirometry, continue to wean off oxygen as tolerated  · Check home oxygen evaluation tomorrow

## 2022-08-04 NOTE — OCCUPATIONAL THERAPY NOTE
Occupational Therapy Evaluation     Patient Name: Lanre WHEELER Date: 8/4/2022  Problem List  Principal Problem:    Dyspnea  Active Problems:    Pulmonary embolism with acute cor pulmonale (HCC)    Gastroesophageal reflux disease    Autoimmune hemolytic anemia    Essential hypertension    Malignant melanoma of leg, right (HCC)    Elevated serum creatinine    Elevated bilirubin    Leukocytosis    Past Medical History  Past Medical History:   Diagnosis Date    Anemia 11/2/2016    Anxiety     Arthritis     Autoimmune hemolytic anemia ([address] )     Claustrophobia     COVID 12/2020    DVT (deep venous thrombosis) (HCC)     GERD (gastroesophageal reflux disease)     Hearing loss, right     Hemolytic anemia ([address] )     History of transfusion     2018 - no adverse reaction    Hypertension     Malignant melanoma of leg, right ([address] ) 7/1/2022    Palpitation     Portal vein thrombosis     PTSD (post-traumatic stress disorder)     Pulmonary emboli (HCC)     Tobacco abuse      Past Surgical History  Past Surgical History:   Procedure Laterality Date    CATARACT EXTRACTION Bilateral     CHOLECYSTECTOMY  7/18/2017    COLONOSCOPY      ELBOW ARTHROPLASTY Left     bursectomy    JOINT REPLACEMENT Right 2/2/2021    knee    KNEE SURGERY Right     meniscus tear    LYMPH NODE BIOPSY Right 7/29/2022    Procedure: BIOPSY LYMPH NODE SENTINEL;  Surgeon: Taylor Smith MD;  [facility];  Service: Surgical Oncology    CA LAP,CHOLECYSTECTOMY/GRAPH N/A 12/23/2017    Procedure: CHOLECYSTECTOMY LAPAROSCOPIC with cholangiogram;  Surgeon: Tobias Garland MD;  [facility];  Service: General    CA REMOVAL SPLEEN, TOTAL N/A 5/18/2017    Procedure: LAPAROSCOPIC HAND ASSIST SPLENECTOMY;  Surgeon: Sumeet Prieto MD;  [facility];  Service: Surgical Oncology    CA TOTAL KNEE ARTHROPLASTY Right 2/2/2021    Procedure: ARTHROPLASTY KNEE TOTAL;  Surgeon: Radha Jha MD;  [facility];  Service: Orthopedics [place] TOTAL KNEE ARTHROPLASTY Left 11/17/2021    Procedure: TOTAL KNEE REPLACEMENT;  Surgeon: Mario Knapp MD;  [facility];  Service: Orthopedics    SHOULDER SURGERY Left     rotator cuff x4, reconstruction    SKIN LESION EXCISION Right 7/29/2022    Procedure: WIDE EXCISION RIGHT MEDIAL THIGH;  Surgeon: Wilfredo Layne MD;  [facility];  Service: Surgical Oncology         08/04/22 0903   OT Last Visit   OT Visit Date 08/04/22   Note Type   Note type Evaluation   Restrictions/Precautions   Weight Bearing Precautions Per Order No   Other Precautions Multiple lines; Fall Risk;Pain;O2   Pain Assessment   Pain Assessment Tool 0-10   Pain Score 6   Pain Location/Orientation Orientation: Right;Location: Groin   Hospital Pain Intervention(s) Repositioned; Ambulation/increased activity   Home Living   Type of [address] [place] [address] Two level;1/2 bath on main level  (bi-level, 0STE through garage, 3STE through front door with additional 7 to main floor)   Bathroom Shower/Tub Walk-in shower   Bathroom Toilet Raised   Bathroom Equipment Grab bars in [facility] Walker;Cane  (did not use PTA)   Prior Function   Level of [place] Independent with ADLs and functional mobility   Lives With [place] Help From Family   ADL Assistance Independent   IADLs Independent   Falls in the last 6 months 0   Vocational Retired   Lifestyle   Autonomy PTA, pt reports being I with ADLs/IADLs, fnxl mobility, (+)    Reciprocal Relationships Wife   Service to Others Retired   Intrinsic Gratification Golf   Psychosocial   Psychosocial ([address]) WDL   ADL   Where Assessed Edge of bed   [address] 7  Independent   Grooming Assistance 7  [address] [facility] 5  [address] [place] [address]  5  [address]  5  [address]  5  Supervision/Setup  (S for L  sock, MIN A for R 2* groin pain)   [address]  5 Supervision/Setup   Bed Mobility   Supine to Sit 5  Supervision   Additional items HOB elevated; Increased time required   Sit to Supine Unable to assess   Transfers   Sit to Stand 5  Supervision   Additional items Increased time required   Stand to Sit 5  Supervision   Additional items Increased time required   Additional Comments transfers with RW   Functional Mobility   Functional Mobility 5  Supervision   Additional Comments initially CGA, progressed to S   Balance   Static Sitting Fair +   Dynamic Sitting Fair   Static Standing Fair   Dynamic Standing [id number] -   Activity Tolerance   Activity Tolerance Patient tolerated treatment well   Medical Staff Made Aware PT Travon Cornejo   Nurse Made Aware YASMIN Vergara   RUE Assessment   RUE Assessment WFL   LUE Assessment   LUE Assessment WFL   Vision - Complex Assessment   Ocular Range of Motion WFL   Head Position WDL   Tracking Able to track stimulus in all quads without difficulty   Cognition   Overall Cognitive Status [facility]   Arousal/Participation Alert; Responsive; Cooperative   Attention Within functional limits   Orientation Level Oriented X4   Memory Within functional limits   Following Commands Follows all commands and directions without difficulty   Comments Pt very pleasant and cooperative t/o session   Assessment   Assessment Pt is a 75 y o  male admitted to [facility] on 7/29/2022 w/ Dyspnea, PE, malignant melanoma RLE  Pt s/p excision and lymph node biopsy 7/29   has a past medical history of Anemia, Anxiety, Arthritis, Claustrophobia, COVID, DVT, GERD, Hearing loss, right, Hemolytic anemia, Hypertension, Malignant melanoma of leg, right, Palpitation, Portal vein thrombosis, PTSD, Pulmonary emboli, and Tobacco abuse  Pt with active OT orders and up as tolerated orders  Pt seen as a co-evaluation with PT due to the patient's co-morbidities, clinically unstable presentation/clinical complexity, and present impairments   As per pt report, pta, resides with his wife in a bi-level home, 0 vs 3STE  Pt was I w/  ADLS and IADLS, (+) drove  Upon evaluation, pt currently requires S for transfers and mobility  Pt currently requires I eating, I grooming, S UB ADLs, S LB ADLs (S L sock, MIN A R 2* groin pain - wife can A), and S toileting  Pt primarily limited by R groin pain  Reports supportive wife who can assist as needed  Pt with no questions or concerns at this time  From OT standpoint, recommendation would be home with social support  No further acute OT needs  D/C OT  Please re-consult if needed  Thank you  Pt was left after session with all current needs met  The patient's raw score on the AM-PAC Daily Activity inpatient short form is 20, standardized score is 42 03, greater than 39 4  Patients at this level are likely to benefit from discharge to home  Please refer to the recommendation of the Occupational Therapist for safe discharge planning     Plan   OT Frequency Eval only   Recommendation   OT Discharge Recommendation No rehabilitation needs  (home with social support)   AM-PAC Daily Activity Inpatient   Lower Body Dressing 3   Bathing 3   Toileting 3   Upper Body Dressing 3   Grooming 4   Eating 4   Daily Activity Raw Score 20   Daily Activity Standardized Score (Calc for Raw Score >=11) 42 03   AM-PAC Applied Cognition Inpatient   Following a Speech/Presentation 4   Understanding Ordinary Conversation 4   Taking Medications 4   Remembering Where Things Are Placed or Put Away 4   Remembering List of 4-5 Errands 4   Taking Care of Complicated Tasks 4   Applied Cognition Raw Score 24   Applied Cognition Standardized Score 62 21       Luke Lebron MS, OTR/L

## 2022-08-04 NOTE — PLAN OF CARE
Problem: MOBILITY - ADULT  Goal: Maintain or return to baseline ADL function  Description: INTERVENTIONS:  -  Assess patient's ability to carry out ADLs; assess patient's baseline for ADL function and identify physical deficits which impact ability to perform ADLs (bathing, care of mouth/teeth, toileting, grooming, dressing, etc )  - Assess/evaluate cause of self-care deficits   - Assess range of motion  - Assess patient's mobility; develop plan if impaired  - Assess patient's need for assistive devices and provide as appropriate  - Encourage maximum independence but intervene and supervise when necessary  - Involve family in performance of ADLs  - Assess for home care needs following discharge   - Consider OT consult to assist with ADL evaluation and planning for discharge  - Provide patient education as appropriate  Outcome: Progressing  Goal: Maintains/Returns to pre admission functional level  Description: INTERVENTIONS:  - Perform BMAT or MOVE assessment daily    - Set and communicate daily mobility goal to care team and patient/family/caregiver     - Collaborate with rehabilitation services on mobility goals if consulted  -Outcome: Progressing     Problem: PAIN - ADULT  Goal: Verbalizes/displays adequate comfort level or baseline comfort level  Description: Interventions:  - Encourage patient to monitor pain and request assistance  - Assess pain using appropriate pain scale  - Administer analgesics based on type and severity of pain and evaluate response  - Implement non-pharmacological measures as appropriate and evaluate response  - Consider cultural and social influences on pain and pain management  - Notify physician/advanced practitioner if interventions unsuccessful or patient reports new pain  Outcome: Progressing     Problem: INFECTION - ADULT  Goal: Absence or prevention of progression during hospitalization  Description: INTERVENTIONS:  - Assess and monitor for signs and symptoms of infection  - Monitor lab/diagnostic results  - Monitor all insertion sites, i e  indwelling lines, tubes, and drains  - Monitor endotracheal if appropriate and nasal secretions for changes in amount and color  - [place] appropriate cooling/warming therapies per order  - Administer medications as ordered  - Instruct and encourage patient and family to use good hand hygiene technique  - Identify and instruct in appropriate isolation precautions for identified infection/condition  Outcome: Progressing  Goal: Absence of fever/infection during neutropenic period  Description: INTERVENTIONS:  - Monitor WBC    Outcome: Progressing     Problem: SAFETY ADULT  Goal: Maintain or return to baseline ADL function  Description: INTERVENTIONS:  -  Assess patient's ability to carry out ADLs; assess patient's baseline for ADL function and identify physical deficits which impact ability to perform ADLs (bathing, care of mouth/teeth, toileting, grooming, dressing, etc )  - Assess/evaluate cause of self-care deficits   - Assess range of motion  - Assess patient's mobility; develop plan if impaired  - Assess patient's need for assistive devices and provide as appropriate  - Encourage maximum independence but intervene and supervise when necessary  - Involve family in performance of ADLs  - Assess for home care needs following discharge   - Consider OT consult to assist with ADL evaluation and planning for discharge  - Provide patient education as appropriate  Outcome: Progressing  Goal: Maintains/Returns to pre admission functional level  Description: INTERVENTIONS:  - Perform BMAT or MOVE assessment daily    - Set and communicate daily mobility goal to care team and patient/family/caregiver     - Collaborate with rehabilitation services on mobility goals if consulted  -Outcome: Progressing  Goal: Patient will remain free of falls  Description: INTERVENTIONS:  - Educate patient/family on patient safety including physical limitations  - Instruct patient to call for assistance with activity   - Consult OT/PT to assist with strengthening/mobility   - Keep Call bell within reach  - Keep bed low and locked with side rails adjusted as appropriate  - Keep care items and personal belongings within reach  - Initiate and maintain comfort rounds  - Make Fall Risk Sign visible to staff  -Outcome: Progressing     Problem: DISCHARGE PLANNING  Goal: Discharge to home or other facility with appropriate resources  Description: INTERVENTIONS:  - Identify barriers to discharge w/patient and caregiver  - Arrange for needed discharge resources and transportation as appropriate  - Identify discharge learning needs (meds, wound care, etc )  - Arrange for interpretive services to assist at discharge as needed  - Refer to Case Management Department for coordinating discharge planning if the patient needs post-hospital services based on physician/advanced practitioner order or complex needs related to functional status, cognitive ability, or social support system  Outcome: Progressing     Problem: Knowledge Deficit  Goal: Patient/family/caregiver demonstrates understanding of disease process, treatment plan, medications, and discharge instructions  Description: Complete learning assessment and assess knowledge base    Interventions:  - Provide teaching at level of understanding  - Provide teaching via preferred learning methods  Outcome: Progressing     Problem: Potential for Falls  Goal: Patient will remain free of falls  Description: INTERVENTIONS:  - Educate patient/family on patient safety including physical limitations  - Instruct patient to call for assistance with activity   - Consult OT/PT to assist with strengthening/mobility   - Keep Call bell within reach  - Keep bed low and locked with side rails adjusted as appropriate  - Keep care items and personal belongings within reach  - Initiate and maintain comfort rounds  - Make Fall Risk Sign visible to staff  Outcome: Progressing

## 2022-08-04 NOTE — PHYSICAL THERAPY NOTE
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    Hyperbilirubinemia    Symptomatic anemia    Pulmonary embolism with acute cor pulmonale (HCC)    Portal vein thrombosis    Elevated blood pressure reading without diagnosis of hypertension    Shortness of breath    Gastroesophageal reflux disease    Autoimmune hemolytic anemia    ARABELLA (acute kidney injury) ([address] )    Splenomegaly    Iron overload due to repeated red blood cell transfusions    Posttraumatic stress disorder    Essential hypertension    Glucose intolerance (impaired glucose tolerance)    Renal insufficiency    Auto immune neutropenia (HCC)    Primary osteoarthritis of left knee    Pain in joint, lower leg    Pain in left knee    COVID-19    Thrombocytosis    Primary localized osteoarthrosis of the knee, right    Hyperglycemia    Chronic bilateral low back pain with bilateral sciatica    Hypercholesterolemia    Malignant melanoma of leg, right (HCC)    Dyspnea    Acute respiratory failure with hypoxia (HCC)    Elevated serum creatinine    Elevated bilirubin    Leukocytosis       Past Medical History  Past Medical History:   Diagnosis Date    Anemia 11/2/2016    Anxiety     Arthritis     Autoimmune hemolytic anemia ([address] )     Claustrophobia     COVID 12/2020    DVT (deep venous thrombosis) (HCC)     GERD (gastroesophageal reflux disease)     Hearing loss, right     Hemolytic anemia ([address] )     History of transfusion     2018 - no adverse reaction    Hypertension     Malignant melanoma of leg, right ([address] ) 7/1/2022    Palpitation     Portal vein thrombosis     PTSD (post-traumatic stress disorder)     Pulmonary emboli (HCC)     Tobacco abuse        Past Surgical History  Past Surgical History:   Procedure Laterality Date    CATARACT EXTRACTION Bilateral     CHOLECYSTECTOMY 7/18/2017    COLONOSCOPY      ELBOW ARTHROPLASTY Left     bursectomy    JOINT REPLACEMENT Right 2/2/2021    knee    KNEE SURGERY Right     meniscus tear    LYMPH NODE BIOPSY Right 7/29/2022    Procedure: BIOPSY LYMPH NODE SENTINEL;  Surgeon: Ellen Suero MD;  [facility];  Service: Surgical Oncology    MI LAP,CHOLECYSTECTOMY/GRAPH N/A 12/23/2017    Procedure: CHOLECYSTECTOMY LAPAROSCOPIC with cholangiogram;  Surgeon: Ivett Mathur MD;  [facility];  Service: General    MI REMOVAL SPLEEN, TOTAL N/A 5/18/2017    Procedure: LAPAROSCOPIC HAND ASSIST SPLENECTOMY;  Surgeon: Georgia Retana MD;  [facility];  Service: Surgical Oncology    MI TOTAL KNEE ARTHROPLASTY Right 2/2/2021    Procedure: ARTHROPLASTY KNEE TOTAL;  Surgeon: Corine Sandifer, MD;  [facility];  Service: Orthopedics    MI TOTAL KNEE ARTHROPLASTY Left 11/17/2021    Procedure: TOTAL KNEE REPLACEMENT;  Surgeon: Corine Sandifer, MD;  [facility];  Service: Orthopedics    SHOULDER SURGERY Left     rotator cuff x4, reconstruction    SKIN LESION EXCISION Right 7/29/2022    Procedure: WIDE EXCISION RIGHT MEDIAL THIGH;  Surgeon: Ellen Suero MD;  [facility];  Service: Surgical Oncology        08/04/22 0902   PT Last Visit   PT Visit Date 08/04/22   Note Type   Note type Evaluation   Pain Assessment   Pain Assessment Tool 0-10   Pain Score 6   Pain Location/Orientation Orientation: Right;Location: Groin   Hospital Pain Intervention(s) Repositioned; Ambulation/increased activity; Emotional support   Restrictions/Precautions   Weight Bearing Precautions Per Order No   Other Precautions Multiple lines; Fall Risk;O2;Pain  (4L O2)   Home Living   Type of [address] [place] [address] Two level;1/2 bath on main level  (bi-level home  0 SIDRA via garage with first floor 1/2 bath + 14 steps to main living area vs 3+ 7 SIDRA to main living area)   [employer] Raised   Bathroom Equipment Grab bars in [facility];Cane  (did not use PTA)   Prior Function   Level of [place] Independent with ADLs and functional mobility   Lives With Spouse   Receives Help From Family   ADL Assistance Independent   IADLs Independent   Falls in the last 6 months 0   Vocational Retired   Cognition   Overall Cognitive Status WFL   Attention Within functional limits   Orientation Level Oriented X4   Memory Within functional limits   Following Commands Follows all commands and directions without difficulty   Comments pt pleasant and cooperative to participate in therapy session   RLE Assessment   RLE Assessment   (functionally 3+/5 limited 2* pain)   LLE Assessment   LLE Assessment WFL   Bed Mobility   Supine to Sit 5  Supervision   Additional items HOB elevated; Increased time required;Verbal cues   Sit to Supine Unable to assess   Additional Comments pt supine in bed upon arrival  Pt sitting OOB in recliner chair with all needs wihtin reach   Transfers   Sit to Stand 5  Supervision   Additional items Increased time required;Verbal cues   Stand to Sit 5  Supervision   Additional items Increased time required;Verbal cues   Additional Comments transfers without AD; declines use of DME   Ambulation/Elevation   Gait pattern Excessively slow; Foward flexed;Decreased foot clearance; Improper Weight shift; Antalgic; Wide JAY  (lateral sway)   Gait Assistance 4  Minimal assist  (initially CGA progressing to close S level with distance; pt declines use of SPC vs RW)   Additional items Assist x 1;Verbal cues   Assistive Device None   Distance 2 x 40ft; O2 saturation >905 on 4L O2   Stair Management Assistance Not tested  (declines stair trial 2* fatigue)   Balance   Static Sitting Fair +   Dynamic Sitting Fair   Static Standing Fair -   Dynamic Standing Fair -   Ambulatory Fair -   Endurance Deficit   Endurance Deficit Yes   Endurance Deficit Description RLE pain, LOWE   Activity Tolerance   Activity Tolerance Patient tolerated treatment well   Medical Staff Made Aware OT; co-eval performed this date 2* increased medical complexity and multiple co-morbidities   Nurse Made Aware RN cleared pt to participate in therapy session   Assessment   Prognosis Good   Problem List Decreased strength;Decreased endurance; Impaired balance;Decreased mobility   Assessment Pt seen for high complexity PT evaluation  Pt with active PT eval/treat and up as tolerated and ambulate pt orders  Pt is a 75 y o  male who was [facility] to [facility] on 7/29/2022 with dyspnea + R middle lobe PE with right heart strain s/p wide excision R medical thigh + biopsy of lymph nodes  Pt's current dx/ problem list include: PE with acute cor pulmonale, GERD, anemia, HTN, malignant neoplasm of RLE, elevated serum creatinine, elevated bilirubin  Comorbidities affecting pt's physical performance at time of assessment are as follows:  has a past medical history of Anemia (11/2/2016), Anxiety, Arthritis, Autoimmune hemolytic anemia ([address] ), Claustrophobia, COVID (12/2020), DVT (deep venous thrombosis) ([address] ), GERD (gastroesophageal reflux disease), Hearing loss, right, Hemolytic anemia ([address] ), History of transfusion, Hypertension, Malignant melanoma of leg, right ([address] ) (7/1/2022), Palpitation, Portal vein thrombosis, PTSD (post-traumatic stress disorder), Pulmonary emboli (HCC), and Tobacco abuse  Personal factors affecting pt at time of initial examination include: steps to enter environment, advanced age, past experience, inability to perform IADLs, inability to perform ADLs, inability to ambulate household distances   Due to acute medical issues, ongoing medical workup for primary dx; pain, fall risk, increased reliance on more restrictive AD compared to baseline;  decreased activity tolerance compared to baseline, increased assistance needed from caregiver at current time, multiple lines, decline in overall functional mobility status; health management issues; note unstable clinical picture (high complexity)  At baseline, pt resides with spouse in bi-level home with 3+ 7 SIDRA vs 0+ 14 steps and was independent prior to hospital admission  Currently, upon initial examination, pt  is requiring S level A for bed mobility skills; S level for functional transfers and CGA progressing to close S level for ambulation without AD  Currently, pt presents functioning below baseline and with overall mobility deficits 2* to: decreased LE strength/AROM; limited flexibility;  generalized weakness/ deconditioning; decreased endurance; decreased activity tolerance; decreased coordination; impaired balance; gait deviations; SOB/LOWE; fatigue; multiple lines; as well as : weakness, decreased ROM, impaired balance, decreased endurance, pain, decreased activity tolerance, decreased functional mobility tolerance and fall risk  Pt currently at increased risk for falls  At the end of evaluation, pt was left sitting OOB in recliner chair with all needs in reach  Pt would benefit from continued skilled PT services while in hospital to address remaining limitations and maximize functional potential  PT to continue to follow pt and recommends home with OPPT and increased social support  The patient's AM-PAC Basic Mobility Inpatient Short Form Raw Score is 17  A Raw score of greater than 16 suggests the patient may benefit from discharge to home  Please also refer to the recommendation of the Physical Therapist for safe discharge planning  Goals   Patient Goals to go home   STG Expiration Date 08/18/22   Short Term Goal #1 STG 1  Pt will be able to perform bed mobility tasks with mod I level in order to improve overall functional mobility and assist in safe d/c  STG 2  Pt with sit EOB for at least 25 minutes at mod I level in order to strengthen abdominal musculature and assist in future transfers/ ambulation  STG 3   Pt will be able to perform functional transfer with mod I level in order to improve overall functional mobility and assist in safe d/c  STG 4  Pt will be able to ambulate at least 250 feet with least restrictive device with mod I level A in order to improve overall functional mobility and assist in safe d/c  STG 5  Pt will improve sitting/standing static/dynamic balance 1/2 grade in order to improve functional mobility and assist in safe d/c  STG 6  Pt will improve LE strength by 1/2 grade in order to improve functional mobility and assist in safe d/c  STG 7  Pt will be able to negotiate at least 10 stairs with least restrictive device with mod I level A in order to improve overall functional mobility and assist in safe d/c    PT Treatment Day 0   Plan   Treatment/Interventions ADL retraining;Functional transfer training;LE strengthening/ROM; Patient/family training;Bed mobility;Gait training;Spoke to nursing;OT;Spoke to case management   PT Frequency 2-3x/wk   Recommendation   PT Discharge Recommendation Home with outpatient rehabilitation  (+ family support as needed)   Equipment Recommended   (TBD)   AM-PAC Basic Mobility Inpatient   Turning in Bed Without Bedrails 3   Lying on Back to Sitting on Edge of Flat Bed 3   Moving Bed to Chair 3   Standing Up From Chair 3   Walk in Room 3   Climb 3-5 Stairs 2   Basic Mobility Inpatient Raw Score 17   Basic Mobility Standardized Score 39 67   Highest Level Of Mobility   JH-HLM Goal 5: Stand one or more mins   JH-HLM Achieved 7: Walk 25 feet or more   End of Consult   Patient Position at End of Consult Bedside chair; All needs within reach           Angelo Massey PT

## 2022-08-04 NOTE — RESTORATIVE TECHNICIAN NOTE
Restorative Technician Note      Patient Name: Ascencion Shaw     Restorative Tech Visit Date: 8/4/2022  Note Type: Mobility  Patient Position Upon Consult: Bedside chair  Activity Performed: Ambulated  Assistive Device: Other (Comment) (none)  Patient Position at End of Consult: Bedside chair;  All needs within [facility]

## 2022-08-04 NOTE — PROGRESS NOTES
[address] [facility]  Progress Note - Melina Grow 1955, 75 y o  male MRN: [id number]  [facility] Encounter: [id number]  Primary Care Provider: Garima Palencia DO   Date and time admitted to hospital: 7/29/2022  5:58 AM    * Dyspnea  Assessment & Plan  · Patient with progressive dyspnea on exertion for the past 3 weeks prior to admission  · Likely multifactorial in the setting of anemia and PE  · Plan as per below     Pulmonary embolism with acute cor pulmonale (HCC)  Assessment & Plan  · History of DVT/PE, Portal vein thrombosis, maintained on Pradaxa as outpatient - per home med/rec in the computer and OP Heme/Onc note, patient on 75mg Pradax BID - unclear why on this dosing  ·  Both Surg/Onc and Heme/Onc cleared to resume Pradaxa 7/30/22  · However, patient was noted to have increasing oxygen requirements  · Pradaxa had been on hold for 2 days for surgical incision per patient  He was on SC heparin for DVT ppx  · Patient reports history of Xarelto failure in the past  · CTA chest PE study revealed RML PE with RRV:LV >0 9 concerning for right ventricular strain  · Echo revealed LVEF 70%, right ventricular systolic function mildy reduced, moderate-severe TVR  · Pulm following   · On heparin GTT  Heme/Onc recommended to continue the heparin GTT for a few days and then transition back to Pradaxa but at full dose (150mg BID)  · Per discussion with Heme/Onc, unclear timing of PE as patient presented with dyspnea for weeks but also had worsening anemia and his Pradaxa was held pre-op  · Less likely Pradaxa failure per Pulm given Pradaxa was on hold for 48 hours prior to surgery  · Not a candidate for endovascular therapy per IR    · Currently weaned from high flow to 2 liters, encouraged incentive spirometry, continue to wean off oxygen as tolerated  · Check home oxygen evaluation tomorrow    Autoimmune hemolytic anemia  Assessment & Plan  · Follows with Hematology,   [place], as outpatient  · With warm and cold antibody  · LDH elevated at 1,077  · Sherry test positive  · Retic count elevated  · Continue transfusion support to keep hb above 7 0  · Received retuximab 8/1, monitor for response  · Continue prednisone 40mg    Leukocytosis  Assessment & Plan  · Patient has chronic leukocytosis  · With chronic steroid use, recent surgery, history of splenectomy, and PE   · improving    Elevated bilirubin  Assessment & Plan  · Total bilirubin 8 08  · Direct bilirubin 0 77  · With hemolytic anemia as above    Elevated serum creatinine  Assessment & Plan  · Creatinine is at baseline  · Monitor periodically while inpatient  · Avoid nephrotoxins and hypotension     Malignant melanoma of leg, right (HCC)  Assessment & Plan  · Status post Excision and sentinel lymph node biopsy 7/29/22  · Surgical Oncology is following      Essential hypertension  Assessment & Plan  · Acceptable BP  · Continue Toprol XL and HCTZ    Gastroesophageal reflux disease  Assessment & Plan  · Continue PPI          VTE Pharmacologic Prophylaxis: VTE Score: 3 Moderate Risk (Score 3-4) - Pharmacological DVT Prophylaxis Ordered: heparin drip  Patient Centered Rounds: I performed bedside rounds with nursing staff today  Discussions with Specialists or Other Care Team Provider: heme/onc    Education and Discussions with Family / Patient: Patient declined call to   Time Spent for Care: 20 minutes  More than 50% of total time spent on counseling and coordination of care as described above  Current Length of Stay: 6 day(s)  Current Patient Status: Inpatient   Certification Statement: The patient will continue to require additional inpatient hospital stay due to monitor for stability of hb, discharge planning  Discharge Plan: Anticipate discharge tomorrow to home  Code Status: Level 1 - Full Code    Subjective:   Reports that he feels much better today      Objective:     Vitals:   Temp (24hrs), Av 8 °F (36 6 °C), Min:97 3 °F (36 3 °C), Max:98 1 °F (36 7 °C)    Temp:  [97 3 °F (36 3 °C)-98 1 °F (36 7 °C)] 97 9 °F (36 6 °C)  HR:  [77-90] 90  Resp:  [18-20] 20  BP: (130-151)/(74-88) 139/80  SpO2:  [93 %-99 %] 96 %  Body mass index is 30 07 kg/m²  Input and Output Summary (last 24 hours): Intake/Output Summary (Last 24 hours) at 2022 1706  Last data filed at 2022 1512  Gross per 24 hour   Intake 956 51 ml   Output 600 ml   Net 356 51 ml       Physical Exam:   Physical Exam  Constitutional:       Appearance: Normal appearance  HENT:      Head: Normocephalic and atraumatic  Nose: Nose normal       Mouth/Throat:      Mouth: Mucous membranes are moist       Pharynx: Oropharynx is clear  Eyes:      Extraocular Movements: Extraocular movements intact  Cardiovascular:      Rate and Rhythm: Normal rate and regular rhythm  Pulmonary:      Effort: Pulmonary effort is normal       Breath sounds: No wheezing or rales  Musculoskeletal:      Right lower leg: No edema  Left lower leg: No edema  Neurological:      General: No focal deficit present  Mental Status: He is alert and oriented to person, place, and time  Psychiatric:         Mood and Affect: Mood normal          Behavior: Behavior normal           Additional Data:     Labs:  Results from last 7 days   Lab Units 22  0610 22   WBC Thousand/uL 16 77*   < > 23 27*   HEMOGLOBIN g/dL 8 4*   < > 6 6*   HEMATOCRIT % 23 6*   < > 18 9*   PLATELETS Thousands/uL 272   < > 259   LYMPHO PCT %  --   --  14   MONO PCT %  --   --  9   EOS PCT %  --   --  0    < > = values in this interval not displayed       Results from last 7 days   Lab Units 22  0610 22   SODIUM mmol/L 140   < > 140   POTASSIUM mmol/L 3 1*   < > 3 4*   CHLORIDE mmol/L 106   < > 107   CO2 mmol/L 28   < > 26   BUN mg/dL 34*   < > 38*   CREATININE mg/dL 1 29   < > 1 33*   ANION GAP mmol/L 6   < > 7 CALCIUM mg/dL 9 3   < > 8 8   ALBUMIN g/dL  --   --  3 2*   TOTAL BILIRUBIN mg/dL  --   --  8 53*   ALK PHOS U/L  --   --  91   ALT U/L  --   --  63   AST U/L  --   --  115*   GLUCOSE RANDOM mg/dL 116   < > 114    < > = values in this interval not displayed  Results from last 7 days   Lab Units 07/31/22  0252   INR  1 02                   Lines/Drains:  Invasive Devices  Report    Peripheral Intravenous Line  Duration           Peripheral IV 08/01/22 Left Antecubital 2 days                      Imaging: No pertinent imaging reviewed      Recent Cultures (last 7 days):         Last 24 Hours Medication List:   Current Facility-Administered Medications   Medication Dose Route Frequency Provider Last Rate    acetaminophen  975 mg Oral [facility] Jose Perez PA-C      bacitracin  1 small application Topical Daily Samantha Urias PA-C      dabigatran etexilate  150 mg Oral Q12H Ailyn Yarbrough MD      docusate sodium  100 mg Oral BID Beatris Hammer MD      heparin (porcine)  3-30 Units/kg/hr (Order-Specific) Intravenous Titrated Enrico Madden MD 22 Units/kg/hr (08/04/22 1304)    heparin (porcine)  3,400 Units Intravenous Q1H PRN VISHNU Marshall      heparin (porcine)  6,800 Units Intravenous Q1H PRN VISHNU Marshall      hydrochlorothiazide  25 mg Oral QAM [place] A[place]y [place]pie [place], [place]      metoprolol succinate  12 5 mg Oral QAM [place] A[place]y [place], [place]      ondansetron  4 mg Intravenous Q4H PRN Jose Perez PA-C      oxyCODONE  5 mg Oral Q4H PRN Jose Perez PA-C      pantoprazole  40 mg Oral Early Morning [place] Apley [place], PA-C      polyethylene glycol  17 g Oral Daily Beatris Hammer MD      potassium chloride  40 mEq Oral TID With Meals Enrico Madden MD      predniSONE  40 mg Oral Daily Amarjitbuzz Ambrocio PA-C      traMADol  50 mg Oral Q6H [address] Jose Perez PA-C          Today, Patient Was Seen By: Angelo Sauer, MD    **Please Note: This note may have been constructed using a voice recognition system  **

## 2022-08-04 NOTE — ASSESSMENT & PLAN NOTE
· Patient has chronic leukocytosis  · With chronic steroid use, recent surgery, history of splenectomy, and PE   · improving

## 2022-08-05 VITALS
RESPIRATION RATE: 16 BRPM | HEART RATE: 109 BPM | WEIGHT: 192 LBS | DIASTOLIC BLOOD PRESSURE: 74 MMHG | HEIGHT: 67 IN | SYSTOLIC BLOOD PRESSURE: 142 MMHG | TEMPERATURE: 98.7 F | OXYGEN SATURATION: 90 % | BODY MASS INDEX: 30.13 KG/M2

## 2022-08-05 LAB
ABO GROUP BLD BPU: NORMAL
ABO GROUP BLD BPU: NORMAL
ALBUMIN SERPL BCP-MCNC: 3.5 G/DL (ref 3.5–5)
ALP SERPL-CCNC: 96 U/L (ref 46–116)
ALT SERPL W P-5'-P-CCNC: 51 U/L (ref 12–78)
ANION GAP SERPL CALCULATED.3IONS-SCNC: 6 MMOL/L (ref 4–13)
ANISOCYTOSIS BLD QL SMEAR: PRESENT
APTT PPP: 20 SECONDS (ref 23–37)
AST SERPL W P-5'-P-CCNC: 65 U/L (ref 5–45)
ATRIAL RATE: 105 BPM
ATRIAL RATE: 105 BPM
BASOPHILS # BLD MANUAL: 0 THOUSAND/UL (ref 0–0.1)
BASOPHILS NFR MAR MANUAL: 0 % (ref 0–1)
BILIRUB SERPL-MCNC: 7.7 MG/DL (ref 0.2–1)
BPU ID: NORMAL
BPU ID: NORMAL
BUN SERPL-MCNC: 34 MG/DL (ref 5–25)
CALCIUM SERPL-MCNC: 9.2 MG/DL (ref 8.3–10.1)
CHLORIDE SERPL-SCNC: 106 MMOL/L (ref 96–108)
CO2 SERPL-SCNC: 27 MMOL/L (ref 21–32)
CREAT SERPL-MCNC: 1.25 MG/DL (ref 0.6–1.3)
CROSSMATCH: NORMAL
CROSSMATCH: NORMAL
EOSINOPHIL # BLD MANUAL: 0 THOUSAND/UL (ref 0–0.4)
EOSINOPHIL NFR BLD MANUAL: 0 % (ref 0–6)
GFR SERPL CREATININE-BSD FRML MDRD: 58 ML/MIN/1.73SQ M
GLUCOSE SERPL-MCNC: 117 MG/DL (ref 65–140)
HCT VFR BLD AUTO: 25.1 % (ref 36.5–49.3)
HGB BLD-MCNC: 7.9 G/DL (ref 12–17)
HOWELL-JOLLY BOD BLD QL SMEAR: PRESENT
LYMPHOCYTES # BLD AUTO: 1.59 THOUSAND/UL (ref 0.6–4.47)
LYMPHOCYTES # BLD AUTO: 10 % (ref 14–44)
MACROCYTES BLD QL AUTO: PRESENT
MCH RBC QN AUTO: 40.5 PG (ref 26.8–34.3)
MCHC RBC AUTO-ENTMCNC: 31.5 G/DL (ref 31.4–37.4)
MCV RBC AUTO: 129 FL (ref 82–98)
MONOCYTES # BLD AUTO: 1.59 THOUSAND/UL (ref 0–1.22)
MONOCYTES NFR BLD: 10 % (ref 4–12)
NEUTROPHILS # BLD MANUAL: 12.72 THOUSAND/UL (ref 1.85–7.62)
NEUTS SEG NFR BLD AUTO: 80 % (ref 43–75)
NRBC BLD AUTO-RTO: 25 /100 WBC (ref 0–2)
P AXIS: 42 DEGREES
P AXIS: 48 DEGREES
PATHOLOGY REVIEW: YES
PLATELET # BLD AUTO: 276 THOUSANDS/UL (ref 149–390)
PLATELET BLD QL SMEAR: ADEQUATE
PMV BLD AUTO: 11.4 FL (ref 8.9–12.7)
POLYCHROMASIA BLD QL SMEAR: PRESENT
POTASSIUM SERPL-SCNC: 3.1 MMOL/L (ref 3.5–5.3)
PR INTERVAL: 142 MS
PR INTERVAL: 146 MS
PROT SERPL-MCNC: 6.1 G/DL (ref 6.4–8.4)
QRS AXIS: 24 DEGREES
QRS AXIS: 26 DEGREES
QRSD INTERVAL: 80 MS
QRSD INTERVAL: 84 MS
QT INTERVAL: 334 MS
QT INTERVAL: 336 MS
QTC INTERVAL: 441 MS
QTC INTERVAL: 444 MS
RBC # BLD AUTO: 1.95 MILLION/UL (ref 3.88–5.62)
SODIUM SERPL-SCNC: 139 MMOL/L (ref 135–147)
T WAVE AXIS: 41 DEGREES
T WAVE AXIS: 46 DEGREES
TOTAL CELLS COUNTED SPEC: 100
UNIT DISPENSE STATUS: NORMAL
UNIT DISPENSE STATUS: NORMAL
UNIT PRODUCT CODE: NORMAL
UNIT PRODUCT CODE: NORMAL
UNIT PRODUCT VOLUME: 350 ML
UNIT PRODUCT VOLUME: 350 ML
UNIT RH: NORMAL
UNIT RH: NORMAL
VENTRICULAR RATE: 105 BPM
VENTRICULAR RATE: 105 BPM
WBC # BLD AUTO: 15.9 THOUSAND/UL (ref 4.31–10.16)

## 2022-08-05 PROCEDURE — 99239 HOSP IP/OBS DSCHRG MGMT >30: CPT | Performed by: INTERNAL MEDICINE

## 2022-08-05 PROCEDURE — 94761 N-INVAS EAR/PLS OXIMETRY MLT: CPT

## 2022-08-05 PROCEDURE — 93005 ELECTROCARDIOGRAM TRACING: CPT

## 2022-08-05 PROCEDURE — 85007 BL SMEAR W/DIFF WBC COUNT: CPT | Performed by: INTERNAL MEDICINE

## 2022-08-05 PROCEDURE — 85027 COMPLETE CBC AUTOMATED: CPT | Performed by: INTERNAL MEDICINE

## 2022-08-05 PROCEDURE — 99232 SBSQ HOSP IP/OBS MODERATE 35: CPT | Performed by: INTERNAL MEDICINE

## 2022-08-05 PROCEDURE — 85730 THROMBOPLASTIN TIME PARTIAL: CPT | Performed by: INTERNAL MEDICINE

## 2022-08-05 PROCEDURE — 93010 ELECTROCARDIOGRAM REPORT: CPT | Performed by: INTERNAL MEDICINE

## 2022-08-05 RX ORDER — BENZONATATE 100 MG/1
100 CAPSULE ORAL 3 TIMES DAILY
Qty: 20 CAPSULE | Refills: 0 | Status: SHIPPED | OUTPATIENT
Start: 2022-08-05

## 2022-08-05 RX ORDER — BENZONATATE 100 MG/1
100 CAPSULE ORAL 3 TIMES DAILY
Status: DISCONTINUED | OUTPATIENT
Start: 2022-08-05 | End: 2022-08-05 | Stop reason: HOSPADM

## 2022-08-05 RX ADMIN — METOPROLOL SUCCINATE 12.5 MG: 25 TABLET, EXTENDED RELEASE ORAL at 08:08

## 2022-08-05 RX ADMIN — POTASSIUM CHLORIDE 40 MEQ: 1500 TABLET, EXTENDED RELEASE ORAL at 08:08

## 2022-08-05 RX ADMIN — ACETAMINOPHEN 975 MG: 325 TABLET ORAL at 06:29

## 2022-08-05 RX ADMIN — POTASSIUM CHLORIDE 40 MEQ: 1500 TABLET, EXTENDED RELEASE ORAL at 12:48

## 2022-08-05 RX ADMIN — PANTOPRAZOLE SODIUM 40 MG: 40 TABLET, DELAYED RELEASE ORAL at 06:29

## 2022-08-05 RX ADMIN — BACITRACIN ZINC 1 SMALL APPLICATION: 500 OINTMENT TOPICAL at 08:15

## 2022-08-05 RX ADMIN — DABIGATRAN ETEXILATE MESYLATE 150 MG: 150 CAPSULE ORAL at 08:08

## 2022-08-05 RX ADMIN — ACETAMINOPHEN 975 MG: 325 TABLET ORAL at 12:48

## 2022-08-05 RX ADMIN — HYDROCHLOROTHIAZIDE 25 MG: 25 TABLET ORAL at 08:08

## 2022-08-05 RX ADMIN — SULFAMETHOXAZOLE AND TRIMETHOPRIM 1 TABLET: 800; 160 TABLET ORAL at 08:08

## 2022-08-05 RX ADMIN — PREDNISONE 40 MG: 20 TABLET ORAL at 08:08

## 2022-08-05 NOTE — RESTORATIVE TECHNICIAN NOTE
Restorative Technician Note      Patient Name: Denzel López     Restorative Tech Visit Date: 8/5/2022  Note Type: Mobility  Patient Position Upon Consult: Supine  Activity Performed: Ambulated  Assistive Device: Other (Comment) (none)  Patient Position at End of Consult: Bedside chair;  All needs within [facility]

## 2022-08-05 NOTE — ASSESSMENT & PLAN NOTE
· History of DVT/PE, Portal vein thrombosis, maintained on Pradaxa as outpatient - per home med/rec in the computer and OP Heme/Onc note, patient on 75mg Pradax BID - unclear why on this dosing  ·  Both Surg/Onc and Heme/Onc cleared to resume Pradaxa 7/30/22  · However, patient was noted to have increasing oxygen requirements  · Pradaxa had been on hold for 2 days for surgical incision per patient  He was on SC heparin for DVT ppx  · Patient reports history of Xarelto failure in the past  · CTA chest PE study revealed RML PE with RRV:LV >0 9 concerning for right ventricular strain  · Echo revealed LVEF 70%, right ventricular systolic function mildy reduced, moderate-severe TVR  · Pulm following   · On heparin GTT  Heme/Onc recommended to continue the heparin GTT for a few days and then transition back to Pradaxa but at full dose (150mg BID)  · Per discussion with Heme/Onc, unclear timing of PE as patient presented with dyspnea for weeks but also had worsening anemia and his Pradaxa was held pre-op  · Less likely Pradaxa failure per Pulm given Pradaxa was on hold for 48 hours prior to surgery  · Not a candidate for endovascular therapy per IR  · Currently weaned from high flow to room air, encouraged incentive spirometry    · Passed home oxygen evaluation, no supplemental oxygen indicated  · Restarted on pradaxa at 150mg BID

## 2022-08-05 NOTE — RESPIRATORY THERAPY NOTE
Home Oxygen Qualifying Test     Patient name: Samantha Mathis        : 1955   Date of Test:  2022  Diagnosis:    Home Oxygen Test:    **Medicare Guidelines require item(s) 1-5 on all ambulatory patients or 1 and 2 on non-ambulatory patients  1  Baseline SPO2 on Room Air at rest 90 %   a  If <= 88% on Room Air add O2 via NC to obtain SpO2 >=88%  If LPM needed, document LPM NA needed to reach =>88%    2  SPO2 during exertion on Room Air 88  %  a  During exertion monitor SPO2  If SPO2 increases >=89%, do not add supplemental oxygen    3  SPO2 on Oxygen at Rest 94% at 1 LPM    4  SPO2 during exertion on Oxygen NA % at NA LPM    5  Test performed during exertion activity  []  Supplemental Home Oxygen is indicated  [x]  Client does not qualify for home oxygen  Respiratory Additional Notes-Pt walked did have SOB      Nasreen Kimball, RT

## 2022-08-05 NOTE — DISCHARGE SUMMARY
[address] [facility]  Discharge- Kee Nicole 1955, 75 y o  male MRN: [id number]  Unit/Bed#: [facility] 929-01 Encounter: [id number]  Primary Care Provider: Irving Luis DO   Date and time admitted to hospital: 7/29/2022  5:58 AM    * Dyspnea  Assessment & Plan  · Patient with progressive dyspnea on exertion for the past 3 weeks prior to admission  · Likely multifactorial in the setting of anemia and PE  · Improved  · Plan as below    Pulmonary embolism with acute cor pulmonale (HCC)  Assessment & Plan  · Likely POA  · History of DVT/PE, Portal vein thrombosis, maintained on Pradaxa as outpatient - per home med/rec in the computer and OP Heme/Onc note, patient on 75mg Pradax BID - unclear why on this dosing  ·  Both Surg/Onc and Heme/Onc cleared to resume Pradaxa 7/30/22  · However, patient was noted to have increasing oxygen requirements  · Pradaxa had been on hold for 2 days for surgical incision per patient  He was on SC heparin for DVT ppx  · Patient reports history of Xarelto failure in the past  · CTA chest PE study revealed RML PE with RRV:LV >0 9 concerning for right ventricular strain  · Echo revealed LVEF 70%, right ventricular systolic function mildy reduced, moderate-severe TVR  · Pulm following   · On heparin GTT  Heme/Onc recommended to continue the heparin GTT for a few days and then transition back to Pradaxa but at full dose (150mg BID)  · Less likely Pradaxa failure per Pulm given Pradaxa was on hold for 48 hours prior to surgery  · Not a candidate for endovascular therapy per IR  · Currently weaned from high flow to room air, encouraged incentive spirometry    · Passed home oxygen evaluation, no supplemental oxygen indicated  · Restarted on pradaxa at 150mg BID    Autoimmune hemolytic anemia  Assessment & Plan  · Follows with Hematology, Dr Lizette Ferguson, as outpatient  · With warm and cold antibody  · LDH elevated at 1,077  · Sherry test positive  · Retic count elevated  · Continue transfusion support to keep hb above 7 0  · Received retuximab 8/1  · Continue prednisone 40mg  · Check hb on Monday and will obtain weekly rituximab infusions starting tuesday    Leukocytosis  Assessment & Plan  · Patient has chronic leukocytosis  · With chronic steroid use, recent surgery, history of splenectomy, and PE   · improving    Elevated bilirubin  Assessment & Plan  · Total bilirubin 8 08  · Direct bilirubin 0 77  · With hemolytic anemia as above    Elevated serum creatinine  Assessment & Plan  · Creatinine is at baseline  · Monitor periodically while inpatient  · Avoid nephrotoxins and hypotension     Malignant melanoma of leg, right [facility])  Assessment & Plan  · Status post Excision and sentinel lymph node biopsy 7/29/22  · Outpatient surg onc follow up      Essential hypertension  Assessment & Plan  · Acceptable BP  · Continue Toprol XL and HCTZ    Gastroesophageal reflux disease  Assessment & Plan  · Continue PPI        Medical Problems             Resolved Problems  Date Reviewed: 8/1/2022   None               Discharging Physician / Practitioner: Ksenia Ramsay MD  PCP: Garima Palencia DO  Admission Date:   Admission Orders (From admission, onward)     Ordered        07/29/22 1136  Inpatient Admission  Once                      Discharge Date: 08/05/22    Consultations During Hospital Stay:  · Oncology  · Surgical oncology  · Pulmonology  · IR    Procedures Performed:   WIDE EXCISION RIGHT MEDIAL THIGH (Right)  BIOPSY LYMPH NODE SENTINEL (Right)    Significant Findings / Test Results:   CTA chest: Right middle lobe pulmonary embolism are noted       RV LV ratio is greater than 0 9 concerning for right ventricular strain       Echo: Left Ventricle: Left ventricular cavity size is normal  Wall thickness is mildly increased  The left ventricular ejection fraction is 70%  Systolic function is hyperdynamic   Wall motion is normal     Right Ventricle: Right ventricular cavity size is dilated  Systolic function is mildly reduced    Tricuspid Valve: There is moderate to severe regurgitation  The right ventricular systolic pressure is severely elevated  The estimated right ventricular systolic pressure is 80 mmHg  RIGHT LOWER LIMB:  Based on color flow imaging, evaluation shows no evidence of acute deep vein  thrombosis  The common femoral artery and sapheno-femoral junction were not  evaluated secondary to lymph node removal at groin/pain  Tech note: A non-vascular structure measuring 3 47 cm was identified at the  groin at the lymph node removal site  No evidence of superficial thrombophlebitis noted  Doppler evaluation shows a normal response to augmentation maneuvers  Popliteal, posterior tibial, and anterior tibial arterial Doppler waveforms are  triphasic  LEFT LOWER LIMB:  No evidence of acute or chronic deep vein thrombosis  No evidence of superficial thrombophlebitis noted  Doppler evaluation shows a normal response to augmentation maneuvers  Popliteal, posterior tibial, and anterior tibial arterial Doppler waveforms are  triphasic  Incidental Findings:   · None    Test Results Pending at Discharge (will require follow up): · None     Outpatient Tests Requested:  · CBC weekly    Complications:  None    Reason for Admission: surgical resection of melanoma    Hospital Course:   Melina Shi is a 75 y o  male patient who originally presented to the hospital on 7/29/2022 due to melanoma  He was electively admitted for a surgical resection of melanoma which was performed  Post operative he was found to be hypoxic and found to have a pulmonary embolism  He was started on heparin and treated for PE  IR was consulted but he was not a candidate for thrombolytic therapy  During his hospital course he had a flare of his autoimmune hemolytic anemia  He was seen by oncology and treated with rituximab   His hemoglobin improved and he was discharged home for further outpatient management by hematology  Please see above list of diagnoses and related plan for additional information  Condition at Discharge: stable    Discharge Day Visit / Exam:   Subjective:  Denies any new complaints  Vitals: Blood Pressure: 142/74 (08/05/22 1445)  Pulse: (!) 109 (08/05/22 1445)  Temperature: 98 7 °F (37 1 °C) (08/05/22 1445)  Temp Source: Oral (08/02/22 1859)  Respirations: 16 (08/05/22 0812)  Height: 5' 7" (170 2 cm) (08/01/22 1400)  Weight - Scale: 87 1 kg (192 lb) (08/01/22 1400)  SpO2: 90 % (08/05/22 1445)  Exam:   Physical Exam  Constitutional:       Appearance: Normal appearance  HENT:      Head: Normocephalic and atraumatic  Mouth/Throat:      Mouth: Mucous membranes are moist       Pharynx: Oropharynx is clear  Eyes:      Extraocular Movements: Extraocular movements intact  Cardiovascular:      Rate and Rhythm: Normal rate and regular rhythm  Pulmonary:      Effort: Pulmonary effort is normal       Breath sounds: No wheezing or rales  Musculoskeletal:      Right lower leg: No edema  Left lower leg: No edema  Skin:     General: Skin is warm and dry  Neurological:      General: No focal deficit present  Mental Status: He is alert and oriented to person, place, and time  Psychiatric:         Mood and Affect: Mood normal          Behavior: Behavior normal           Discussion with Family: Patient declined call to   Discharge instructions/Information to patient and family:   See after visit summary for information provided to patient and family  Provisions for Follow-Up Care:  See after visit summary for information related to follow-up care and any pertinent home health orders  Disposition:   Home    Planned Readmission: No     Discharge Statement:  I spent 45 minutes discharging the patient  This time was spent on the day of discharge  I had direct contact with the patient on the day of discharge   Greater than 50% of the total time was spent examining patient, answering all patient questions, arranging and discussing plan of care with patient as well as directly providing post-discharge instructions  Additional time then spent on discharge activities  Discharge Medications:  See after visit summary for reconciled discharge medications provided to patient and/or family        **Please Note: This note may have been constructed using a voice recognition system**

## 2022-08-05 NOTE — ASSESSMENT & PLAN NOTE
· Follows with Hematology, Dr Fabrizio Vincent, as outpatient  · With warm and cold antibody  · LDH elevated at 1,077  · Sherry test positive  · Retic count elevated  · Continue transfusion support to keep hb above 7 0  · Received retuximab 8/1  · Continue prednisone 40mg  · Check hb on Monday and will obtain weekly rituximab infusions starting tuesday 9.5

## 2022-08-05 NOTE — ASSESSMENT & PLAN NOTE
· Patient with progressive dyspnea on exertion for the past 3 weeks prior to admission  · Likely multifactorial in the setting of anemia and PE  · Improved  · Plan as below

## 2022-08-05 NOTE — PROGRESS NOTES
Medical Oncology/Hematology Progress Note  Gordon Wong, male, 75 y o , 1955,  PPHP 929/PPHP [id number], [id number]     Assessment and Plan    1  Autoimmune hemolytic anemia  · Please see 7/29 consult note for HPI and history  Briefly:  · Dx in 2016, splenectomy 2017, rituximab and cyclophosphamide infusions 2018  · Positive for both warm and cold antibodies, anti-C3 IgG  · Last normal Hg of 12 on 3/21/22  Gradually declined  Has been in the 7-8s since June  · Hg 6 4 on day of elective melanoma excision (7/29)  · Has received 4 transfusions during this hospitalization, 1 daily from 7/29-8/03  · Started on rituximab infusions, first dose received on 8/01, next infusion scheduled 8/09  · Started on prednisone 60 mg on 7/06, decreased to 40 mg daily this hospitalization  · Hg increased to 8 4 on 8/4  7 9 today  Plan:  · Continue prednisone 40 mg on discharge for 2 weeks until outpatient appointment with Racheal Valencia of hematology on 8/24  Patient known to Dr Stevo Mahmood  · Outpatient CBC on Monday  · Hemolysis labs (LDH, haptoglobin, retic count) to be ordered by primary hematology team depending on results  · Dc on Bactrim ppx and continue home pantoprozole 40 mg  · Weekly rituximab infusions  2  Malignant melanoma  · T4 melanoma of medial thigh excised on 7/29 with free margins  · [place] inguinal lymph node biopsy with rare melanoma cells  Plan:  ·  Appt with Dr Julio Phillips on 8/22  Outpatient follow up plan: Hematology apointment with Dede Hernandez on 8/24  Oncology apointment with Dr Julio Phillips on 8/22  Communication with patient/family:  I did update the patient  Communication with team:  Did communicate with Dr Gloria Churchill, primary team     I did review this patient with Dr David Richardson and her is in agreement with this plan  Subjective:    Patient was seen and examined  No acute events OVN  Reports feeling much improved  No longer requiring oxygen via NC with less SOB  Reports walking up and down the halls several times without difficulty  He feels his is ready to go home and is in agreement with the plan highlighted above  Review of Systems   Constitutional: Negative for chills, diaphoresis, fatigue and fever  Eyes: Negative for visual disturbance  Respiratory: Positive for shortness of breath  Cardiovascular: Positive for leg swelling  Negative for chest pain  Gastrointestinal: Negative for abdominal distention, abdominal pain, constipation, diarrhea, nausea and vomiting  Genitourinary: Negative for dysuria  Neurological: Negative for headaches  Hematological: Bruises/bleeds easily  All other systems reviewed and are negative        Objective:     Medication Administration - last 24 hours from 08/04/2022 1433 to 08/05/2022 1433       Date/Time Order Dose Route Action Action by     08/05/2022 0808 hydrochlorothiazide (HYDRODIURIL) tablet 25 mg 25 mg Oral Given Veryl Simpler, RN     08/05/2022 2766 metoprolol succinate (TOPROL-XL) 24 hr tablet 12 5 mg 12 5 mg Oral Given Veryl Simpler, RN     08/05/2022 0629 pantoprazole (PROTONIX) EC tablet 40 mg 40 mg Oral Given Veryl Simpler, RN     08/05/2022 1248 acetaminophen (TYLENOL) tablet 975 mg 975 mg Oral Given Veryl Simpler, RN     08/05/2022 3017 acetaminophen (TYLENOL) tablet 975 mg 975 mg Oral Given Veryl Simpler, RN     08/04/2022 2157 acetaminophen (TYLENOL) tablet 975 mg 975 mg Oral Refused Martha Martin, RN     08/05/2022 1230 traMADol (ULTRAM) tablet 50 mg 50 mg Oral Refused Veryl Simpler, RN     08/05/2022 0159 traMADol (ULTRAM) tablet 50 mg 50 mg Oral Refused Veryl Simpler, RN     08/05/2022 0101 traMADol (ULTRAM) tablet 50 mg 50 mg Oral Refused Martha Martin, RN     08/04/2022 1727 traMADol (ULTRAM) tablet 50 mg 50 mg Oral Given Lelo Heredia, RN     08/05/2022 6962 predniSONE tablet 40 mg 40 mg Oral Given Veryl Simpler, RN     08/04/2022 1715 heparin (porcine) 25,000 units in 0 45% NaCl 250 mL infusion (premix) 0 Units/kg/hr Intravenous Stopped Franny Ramirez RN     08/05/2022 0815 bacitracin topical ointment 1 small application 1 small application Topical Given Rodri Troy RN     08/05/2022 1248 potassium chloride (K-DUR,KLOR-CON) CR tablet 40 mEq 40 mEq Oral Given Rodri Troy RN     08/05/2022 6941 potassium chloride (K-DUR,KLOR-CON) CR tablet 40 mEq 40 mEq Oral Given Rodri Troy RN     08/04/2022 1727 potassium chloride (K-DUR,KLOR-CON) CR tablet 40 mEq 40 mEq Oral Given Franny Ramirez RN     08/05/2022 5065 dabigatran etexilate (PRADAXA) capsule 150 mg 150 mg Oral Given Rodri Troy RN     08/04/2022 2158 dabigatran etexilate (PRADAXA) capsule 150 mg 150 mg Oral Given Martha Martin RN     08/05/2022 4609 sulfamethoxazole-trimethoprim (BACTRIM DS) 800-160 mg per tablet 1 tablet 1 tablet Oral Given Rodri Troy RN          /75   Pulse 101   Temp 98 8 °F (37 1 °C)   Resp 16   Ht 5' 7" (1 702 m)   Wt 87 1 kg (192 lb)   SpO2 (!) 87%   BMI 30 07 kg/m²       Physical Exam  Constitutional:       General: He is not in acute distress  Appearance: He is not ill-appearing, toxic-appearing or diaphoretic  HENT:      Head: Normocephalic and atraumatic  Mouth/Throat:      Mouth: Mucous membranes are moist    Eyes:      Conjunctiva/sclera: Conjunctivae normal       Comments: Improved scleral icterus   Neck:      Comments: No cervical, periclavicular, or axial lymphadenopathy  Cardiovascular:      Rate and Rhythm: Normal rate and regular rhythm  Pulses: Normal pulses  Heart sounds: Normal heart sounds  No murmur heard  No friction rub  No gallop  Pulmonary:      Effort: Pulmonary effort is normal  No respiratory distress  Breath sounds: Normal breath sounds  No wheezing, rhonchi or rales  Abdominal:      General: Bowel sounds are normal       Palpations: Abdomen is soft        Comments: Central adiposity, ventral hernia Musculoskeletal:      Right lower leg: Edema present  Left lower leg: No edema  Comments: Pitting edema of RLE to the mid shins  Skin:     General: Skin is warm and dry  Comments: Improved jaundice   Neurological:      Mental Status: He is alert and oriented to person, place, and time     Psychiatric:         Mood and Affect: Mood normal          Behavior: Behavior normal            Recent Results (from the past 48 hour(s))   APTT    Collection Time: 08/04/22  5:30 AM   Result Value Ref Range    PTT 61 (H) 23 - 37 seconds   CBC and differential    Collection Time: 08/04/22  9:25 AM   Result Value Ref Range    WBC 16 77 (H) 4 31 - 10 16 Thousand/uL    RBC 1 82 (L) 3 88 - 5 62 Million/uL    Hemoglobin 8 4 (L) 12 0 - 17 0 g/dL    Hematocrit 23 6 (L) 36 5 - 49 3 %     (H) 82 - 98 fL    MCH 46 2 (H) 26 8 - 34 3 pg    MCHC 35 6 31 4 - 37 4 g/dL    MPV 11 1 8 9 - 12 7 fL    Platelets 123 906 - 647 Thousands/uL   Basic metabolic panel    Collection Time: 08/04/22  9:25 AM   Result Value Ref Range    Sodium 140 135 - 147 mmol/L    Potassium 3 1 (L) 3 5 - 5 3 mmol/L    Chloride 106 96 - 108 mmol/L    CO2 28 21 - 32 mmol/L    ANION GAP 6 4 - 13 mmol/L    BUN 34 (H) 5 - 25 mg/dL    Creatinine 1 29 0 60 - 1 30 mg/dL    Glucose 116 65 - 140 mg/dL    Calcium 9 3 8 3 - 10 1 mg/dL    eGFR 56 ml/min/1 73sq m   Comprehensive metabolic panel    Collection Time: 08/04/22  9:25 AM   Result Value Ref Range    Sodium 139 135 - 147 mmol/L    Potassium 3 1 (L) 3 5 - 5 3 mmol/L    Chloride 106 96 - 108 mmol/L    CO2 27 21 - 32 mmol/L    ANION GAP 6 4 - 13 mmol/L    BUN 34 (H) 5 - 25 mg/dL    Creatinine 1 25 0 60 - 1 30 mg/dL    Glucose 117 65 - 140 mg/dL    Calcium 9 2 8 3 - 10 1 mg/dL    AST 65 (H) 5 - 45 U/L    ALT 51 12 - 78 U/L    Alkaline Phosphatase 96 46 - 116 U/L    Total Protein 6 1 (L) 6 4 - 8 4 g/dL    Albumin 3 5 3 5 - 5 0 g/dL    Total Bilirubin 7 70 (H) 0 20 - 1 00 mg/dL    eGFR 58 ml/min/1 73sq m APTT    Collection Time: 08/05/22  5:58 AM   Result Value Ref Range    PTT 20 (L) 23 - 37 seconds   CBC and differential    Collection Time: 08/05/22  5:58 AM   Result Value Ref Range    WBC 15 90 (H) 4 31 - 10 16 Thousand/uL    RBC 1 95 (L) 3 88 - 5 62 Million/uL    Hemoglobin 7 9 (L) 12 0 - 17 0 g/dL    Hematocrit 25 1 (L) 36 5 - 49 3 %     (H) 82 - 98 fL    MCH 40 5 (H) 26 8 - 34 3 pg    MCHC 31 5 31 4 - 37 4 g/dL    MPV 11 4 8 9 - 12 7 fL    Platelets 859 800 - 142 Thousands/uL   Manual Differential(PHLEBS Do Not Order)    Collection Time: 08/05/22  5:58 AM   Result Value Ref Range    Segmented % 80 (H) 43 - 75 %    Lymphocytes % 10 (L) 14 - 44 %    Monocytes % 10 4 - 12 %    Eosinophils, % 0 0 - 6 %    Basophils % 0 0 - 1 %    Absolute Neutrophils 12 72 (H) 1 85 - 7 62 Thousand/uL    Lymphocytes Absolute 1 59 0 60 - 4 47 Thousand/uL    Monocytes Absolute 1 59 (H) 0 00 - 1 22 Thousand/uL    Eosinophils Absolute 0 00 0 00 - 0 40 Thousand/uL    Basophils Absolute 0 00 0 00 - 0 10 Thousand/uL    Total Counted 100     nRBC 25 (H) 0 - 2 /100 WBC    Anisocytosis Present     [place] Bodies Present     Macrocytes Present     Polychromasia Present     Platelet Estimate Adequate Adequate    Pathology Review Yes (A) No   Path Slide Review    Collection Time: 08/05/22  5:58 AM   Result Value Ref Range    Path Review       Peripheral smear with increased nucleated red blood cells, [place] bodies, and rare schistocytes, consistent with history of hemolytic anemia  Clinical correlation recommended       Dr Zhane Potts notified by Dr Tolu Donald via [employer] on 8/5/2022 at 8:42am      Maggie Carney MD  Interpretation performed at [facility], [address] [place] [address]     Prepare Leukoreduced RBC: 2 Units, Irradiated, CMV Negative, Leukoreduced    Collection Time: 08/05/22  7:03 AM   Result Value Ref Range    Unit Product Code M4396F01     Unit Number [id number]     Unit ABO O     Unit RH POS Crossmatch Compatible     Unit Dispense Status Return to [facility]     Unit Product Volume 350 mL    Unit Product Code U7187P03     Unit Number [id number]     Unit ABO O     Unit [facility] POS     Crossmatch Compatible     Unit Dispense Status Presumed Trans     Unit Product Volume 350 ml       XR chest portable    Result Date: 7/31/2022  Narrative: CHEST INDICATION:   Hypoxemia  COMPARISON:  7/29/2022 EXAM PERFORMED/VIEWS:  XR CHEST PORTABLE FINDINGS: Cardiomediastinal silhouette appears unremarkable  The lungs are clear  No pneumothorax or pleural effusion  Osseous structures appear within normal limits for patient age  Impression: No acute cardiopulmonary disease  Workstation performed: [id number]     XR chest portable    Result Date: 7/29/2022  Narrative: CHEST INDICATION:   Shortness of breath  COMPARISON:  12/18/2020  EXAM PERFORMED/VIEWS:  XR CHEST PORTABLE FINDINGS:  Monitoring leads and clips project over the chest  Cardiomediastinal silhouette appears stable  The lungs are clear  No pneumothorax or pleural effusion  Degenerative changes  Left upper quadrant embolization coils  Impression: No acute cardiopulmonary disease  Workstation performed: [id number]     NM lymphatic melanoma    Result Date: 7/29/2022  Narrative: SENTINEL NODE LYMPHOSCINTIGRAPHY INDICATION:   Lower extremity melanoma  FINDINGS: 0 55 mCi Tc-99m sulfur colloid (0 6 cc volume) was administered intradermally in divided doses by Dr Lino Finney in the region of the patients right lower extremity melanoma  Scintigraphic images were obtained over the right groin and right lower extremity in multiple projections  Single node is identified within the right groin Using scintigraphic guidance, the corresponding skin site was marked with an indelible marker  The patient was transferred to the operating room in satisfactory condition  Impression: 1  [place] lymph node localized to right inguinal region   Workstation performed: [id number]     CTA chest pe study    Result Date: 7/31/2022  Narrative: CTA - CHEST WITH IV CONTRAST - PULMONARY ANGIOGRAM INDICATION:   tachypnea, tachycardia, R/O PE  COMPARISON: 4/30/2022  TECHNIQUE: CTA examination of the chest was performed using angiographic technique according to a protocol specifically tailored to evaluate for pulmonary embolism  Axial, sagittal, and coronal 2D reformatted images were created from the source data and  submitted for interpretation  In addition, coronal 3D MIP postprocessing was performed on the acquisition scanner  Radiation dose length product (DLP) for this visit:  572 mGy-cm   This examination, like all CT scans performed in the [facility], was performed utilizing techniques to minimize radiation dose exposure, including the use of iterative reconstruction and automated exposure control  IV Contrast:  70 mL of iohexol (OMNIPAQUE)  FINDINGS: PULMONARY ARTERIAL TREE:  Scattered segmental/subsegmental pulmonary emboli are seen throughout the lung    LUNGS:  Scattered groundglass opacities suggests a small vessel or small airways process process  There is no tracheal or endobronchial lesion  PLEURA:  Unremarkable  HEART/GREAT VESSELS:  RV LV ratio is greater than 0 9 concerning for right ventricular strain    No thoracic aortic aneurysm  MEDIASTINUM AND BRENDA:  Unremarkable  CHEST WALL AND LOWER NECK:   Unremarkable  VISUALIZED STRUCTURES IN THE UPPER ABDOMEN:  Unremarkable  OSSEOUS STRUCTURES:  No acute fracture or destructive osseous lesion  Impression: Right middle lobe pulmonary embolism are noted    RV LV ratio is greater than 0 9 concerning for right ventricular strain     I personally discussed this study with [place] on 7/31/2022 at 2:17 AM  Workstation performed: [id number]     VAS lower limb venous duplex study, complete bilateral    Result Date: 8/1/2022  Narrative:  [facility] REPORT CLINICAL: Indications: PT C/O PE    He is currently receiving IV heparin  Physician wants to rule out B/L LE DVT  Operative History: No prior heart or vascular surgery Risk Factors The patient has history of HTN, current smoking, GERD, autoimmune hemolytic anemia, malignant melanoma right leg, COVID 19 virus (12/20), PE, portal vein thrombus, PTSD, and DVT  Height:  67 inches  Weight:  192 lbs  CONCLUSION:  Impression:  RIGHT LOWER LIMB: Based on color flow imaging, evaluation shows no evidence of acute deep vein thrombosis  The common femoral artery and sapheno-femoral junction were not evaluated secondary to lymph node removal at groin/pain  Tech note: A non-vascular structure measuring 3 47 cm was identified at the groin at the lymph node removal site  No evidence of superficial thrombophlebitis noted  Doppler evaluation shows a normal response to augmentation maneuvers  Popliteal, posterior tibial, and anterior tibial arterial Doppler waveforms are triphasic  LEFT LOWER LIMB: No evidence of acute or chronic deep vein thrombosis  No evidence of superficial thrombophlebitis noted  Doppler evaluation shows a normal response to augmentation maneuvers  Popliteal, posterior tibial, and anterior tibial arterial Doppler waveforms are triphasic  Technical findings were posted on EPIC  SIGNATURE: Electronically Signed by: Miki Shi on 2022-08-01 09:28:42 AM    US inguinal area    Addendum Date: 7/6/2022 Addendum:   ADDENDUM: Images were also obtained through the right groin/inguinal region with no evidence for enlarged lymph nodes  Result Date: 7/6/2022  Narrative: RIGHT ANTERIOR THIGH ULTRASOUND INDICATION:   C43 71: Malignant melanoma of right lower limb, including hip  COMPARISON:  Correlation is made with the prior CT study dated 4/30/2022  TECHNIQUE:   Real-time ultrasound of the right anterior thigh was performed with a linear transducer with both volumetric sweeps and still imaging techniques   FINDINGS:  There is a skin lesion demonstrating posterior shadowing measuring approximately 1 3 cm in width with a depth of 0 8 cm likely compatible with known melanoma  No adjacent enlarged lymph nodes are seen in this region  Impression: Skin lesion as above most likely compatible with known melanoma  No adjacent enlarged lymph nodes  Workstation performed: [id number]     US bedside procedure    Result Date: 7/31/2022  Narrative: 1 2 840 [id number] 2 323 [id number] 2    Echo complete w/ contrast if indicated    Result Date: 7/31/2022  Narrative: [place]  Left Ventricle: Left ventricular cavity size is normal  Wall thickness is mildly increased  The left ventricular ejection fraction is 70%  Systolic function is hyperdynamic  Wall motion is normal    Right Ventricle: Right ventricular cavity size is dilated  Systolic function is mildly reduced    Tricuspid Valve: There is moderate to severe regurgitation  The right ventricular systolic pressure is severely elevated  The estimated right ventricular systolic pressure is 80 mmHg  I have personally reviewed labs, imaging studies, and pertinent reports  This note has been generated by voice recognition software system  Therefore, there may be spelling, grammar, and or syntax errors  Please contact if questions arise

## 2022-08-05 NOTE — PLAN OF CARE
Problem: MOBILITY - ADULT  Goal: Maintain or return to baseline ADL function  Description: INTERVENTIONS:  -  Assess patient's ability to carry out ADLs; assess patient's baseline for ADL function and identify physical deficits which impact ability to perform ADLs (bathing, care of mouth/teeth, toileting, grooming, dressing, etc )  - Assess/evaluate cause of self-care deficits   - Assess range of motion  - Assess patient's mobility; develop plan if impaired  - Assess patient's need for assistive devices and provide as appropriate  - Encourage maximum independence but intervene and supervise when necessary  - Involve family in performance of ADLs  - Assess for home care needs following discharge   - Consider OT consult to assist with ADL evaluation and planning for discharge  - Provide patient education as appropriate  Outcome: Adequate for Discharge  Goal: Maintains/Returns to pre admission functional level  Description: INTERVENTIONS:  - Perform BMAT or MOVE assessment daily    - Set and communicate daily mobility goal to care team and patient/family/caregiver  - Collaborate with rehabilitation services on mobility goals if consulted  - Perform Range of Motion  times a day  - Reposition patient ever hours    - Dangle patient  times a day  - Stand patient  times a day  - Ambulate patient  times a day  - Out of bed to chair  times a day   - Out of bed for meals  times a day  - Out of bed for toileting  - Record patient progress and toleration of activity level   Outcome: Adequate for Discharge     Problem: PAIN - ADULT  Goal: Verbalizes/displays adequate comfort level or baseline comfort level  Description: Interventions:  - Encourage patient to monitor pain and request assistance  - Assess pain using appropriate pain scale  - Administer analgesics based on type and severity of pain and evaluate response  - Implement non-pharmacological measures as appropriate and evaluate response  - Consider cultural and social influences on pain and pain management  - Notify physician/advanced practitioner if interventions unsuccessful or patient reports new pain  Outcome: Adequate for Discharge     Problem: INFECTION - ADULT  Goal: Absence or prevention of progression during hospitalization  Description: INTERVENTIONS:  - Assess and monitor for signs and symptoms of infection  - Monitor lab/diagnostic results  - Monitor all insertion sites, i e  indwelling lines, tubes, and drains  - Monitor endotracheal if appropriate and nasal secretions for changes in amount and color  - [place] appropriate cooling/warming therapies per order  - Administer medications as ordered  - Instruct and encourage patient and family to use good hand hygiene technique  - Identify and instruct in appropriate isolation precautions for identified infection/condition  Outcome: Adequate for Discharge  Goal: Absence of fever/infection during neutropenic period  Description: INTERVENTIONS:  - Monitor WBC    Outcome: Adequate for Discharge     Problem: SAFETY ADULT  Goal: Maintain or return to baseline ADL function  Description: INTERVENTIONS:  -  Assess patient's ability to carry out ADLs; assess patient's baseline for ADL function and identify physical deficits which impact ability to perform ADLs (bathing, care of mouth/teeth, toileting, grooming, dressing, etc )  - Assess/evaluate cause of self-care deficits   - Assess range of motion  - Assess patient's mobility; develop plan if impaired  - Assess patient's need for assistive devices and provide as appropriate  - Encourage maximum independence but intervene and supervise when necessary  - Involve family in performance of ADLs  - Assess for home care needs following discharge   - Consider OT consult to assist with ADL evaluation and planning for discharge  - Provide patient education as appropriate  Outcome: Adequate for Discharge  Goal: Maintains/Returns to pre admission functional level  Description: INTERVENTIONS:  - Perform BMAT or MOVE assessment daily    - Set and communicate daily mobility goal to care team and patient/family/caregiver  - Collaborate with rehabilitation services on mobility goals if consulted  - Perform Range of Motion  times a day  - Reposition patient every  hours    - Dangle patient  times a day  - Stand patient  times a day  - Ambulate patient  times a day  - Out of bed to chair  times a day   - Out of bed for meals  times a day  - Out of bed for toileting  - Record patient progress and toleration of activity level   Outcome: Adequate for Discharge  Goal: Patient will remain free of falls  Description: INTERVENTIONS:  - Educate patient/family on patient safety including physical limitations  - Instruct patient to call for assistance with activity   - Consult OT/PT to assist with strengthening/mobility   - Keep Call bell within reach  - Keep bed low and locked with side rails adjusted as appropriate  - Keep care items and personal belongings within reach  - Initiate and maintain comfort rounds  - Make Fall Risk Sign visible to staff  - Offer Toileting every  Hours, in advance of need  - Initiate/Maintain alarm  - Obtain necessary fall risk management equipment:   - Apply yellow socks and bracelet for high fall risk patients  - Consider moving patient to room near nurses station  Outcome: Adequate for Discharge     Problem: DISCHARGE PLANNING  Goal: Discharge to home or other facility with appropriate resources  Description: INTERVENTIONS:  - Identify barriers to discharge w/patient and caregiver  - Arrange for needed discharge resources and transportation as appropriate  - Identify discharge learning needs (meds, wound care, etc )  - Arrange for interpretive services to assist at discharge as needed  - Refer to Case Management Department for coordinating discharge planning if the patient needs post-hospital services based on physician/advanced practitioner order or complex needs related to functional status, cognitive ability, or social support system  Outcome: Adequate for Discharge     Problem: Knowledge Deficit  Goal: Patient/family/caregiver demonstrates understanding of disease process, treatment plan, medications, and discharge instructions  Description: Complete learning assessment and assess knowledge base    Interventions:  - Provide teaching at level of understanding  - Provide teaching via preferred learning methods  Outcome: Adequate for Discharge     Problem: Potential for Falls  Goal: Patient will remain free of falls  Description: INTERVENTIONS:  - Educate patient/family on patient safety including physical limitations  - Instruct patient to call for assistance with activity   - Consult OT/PT to assist with strengthening/mobility   - Keep Call bell within reach  - Keep bed low and locked with side rails adjusted as appropriate  - Keep care items and personal belongings within reach  - Initiate and maintain comfort rounds  - Make Fall Risk Sign visible to staff  - Offer Toileting every  Hours, in advance of need  - Initiate/Maintain alarm  - Obtain necessary fall risk management equipment:   - Apply yellow socks and bracelet for high fall risk patients  - Consider moving patient to room near nurses station  Outcome: Adequate for Discharge

## 2022-08-08 ENCOUNTER — TRANSITIONAL CARE MANAGEMENT (OUTPATIENT)
Dept: FAMILY MEDICINE CLINIC | Facility: CLINIC | Age: 67
End: 2022-08-08

## 2022-08-08 ENCOUNTER — APPOINTMENT (OUTPATIENT)
Dept: LAB | Facility: MEDICAL CENTER | Age: 67
End: 2022-08-08
Payer: MEDICARE

## 2022-08-09 ENCOUNTER — HOSPITAL ENCOUNTER (OUTPATIENT)
Dept: INFUSION CENTER | Facility: CLINIC | Age: 67
Discharge: HOME | End: 2022-08-09
Payer: MEDICARE

## 2022-08-09 VITALS
DIASTOLIC BLOOD PRESSURE: 80 MMHG | HEIGHT: 67 IN | SYSTOLIC BLOOD PRESSURE: 149 MMHG | WEIGHT: 182.1 LBS | TEMPERATURE: 97.2 F | HEART RATE: 105 BPM | BODY MASS INDEX: 28.58 KG/M2 | RESPIRATION RATE: 16 BRPM

## 2022-08-09 DIAGNOSIS — D59.11 HEMOLYTIC ANEMIA DUE TO WARM ANTIBODY (HCC): Primary | ICD-10-CM

## 2022-08-09 DIAGNOSIS — D59.10 AUTOIMMUNE HEMOLYTIC ANEMIA (HCC): ICD-10-CM

## 2022-08-09 PROCEDURE — 96415 CHEMO IV INFUSION ADDL HR: CPT

## 2022-08-09 PROCEDURE — 96367 TX/PROPH/DG ADDL SEQ IV INF: CPT

## 2022-08-09 PROCEDURE — 96413 CHEMO IV INFUSION 1 HR: CPT

## 2022-08-09 RX ORDER — SODIUM CHLORIDE 9 MG/ML
20 INJECTION, SOLUTION INTRAVENOUS ONCE
Status: COMPLETED | OUTPATIENT
Start: 2022-08-09 | End: 2022-08-09

## 2022-08-09 RX ORDER — ACETAMINOPHEN 325 MG/1
650 TABLET ORAL ONCE
Status: COMPLETED | OUTPATIENT
Start: 2022-08-09 | End: 2022-08-09

## 2022-08-09 RX ADMIN — ACETAMINOPHEN 650 MG: 325 TABLET, FILM COATED ORAL at 09:45

## 2022-08-09 RX ADMIN — SODIUM CHLORIDE 20 ML/HR: 0.9 INJECTION, SOLUTION INTRAVENOUS at 09:55

## 2022-08-09 RX ADMIN — RITUXIMAB 746.2 MG: 10 INJECTION, SOLUTION INTRAVENOUS at 10:28

## 2022-08-09 RX ADMIN — DIPHENHYDRAMINE HYDROCHLORIDE 25 MG: 50 INJECTION, SOLUTION INTRAMUSCULAR; INTRAVENOUS at 09:55

## 2022-08-09 NOTE — PROGRESS NOTES
Patient tolerated Rituxan infusion without incident  Pt is aware of all future appointments   Declined AVS

## 2022-08-10 ENCOUNTER — OFFICE VISIT (OUTPATIENT)
Dept: SURGICAL ONCOLOGY | Facility: CLINIC | Age: 67
End: 2022-08-10

## 2022-08-10 VITALS
SYSTOLIC BLOOD PRESSURE: 130 MMHG | HEIGHT: 67 IN | DIASTOLIC BLOOD PRESSURE: 80 MMHG | BODY MASS INDEX: 29.03 KG/M2 | WEIGHT: 185 LBS

## 2022-08-10 DIAGNOSIS — L03.115 CELLULITIS OF RIGHT LOWER EXTREMITY: ICD-10-CM

## 2022-08-10 DIAGNOSIS — C43.71 MALIGNANT MELANOMA OF LEG, RIGHT (HCC): Primary | ICD-10-CM

## 2022-08-10 PROCEDURE — 99024 POSTOP FOLLOW-UP VISIT: CPT | Performed by: STUDENT IN AN ORGANIZED HEALTH CARE EDUCATION/TRAINING PROGRAM

## 2022-08-10 RX ORDER — SODIUM CHLORIDE 9 MG/ML
20 INJECTION, SOLUTION INTRAVENOUS ONCE
Status: CANCELLED | OUTPATIENT
Start: 2022-08-16

## 2022-08-10 RX ORDER — ACETAMINOPHEN 325 MG/1
650 TABLET ORAL ONCE
Status: CANCELLED | OUTPATIENT
Start: 2022-08-16

## 2022-08-10 RX ORDER — CLINDAMYCIN HYDROCHLORIDE 300 MG/1
300 CAPSULE ORAL 4 TIMES DAILY
Qty: 20 CAPSULE | Refills: 0 | Status: SHIPPED | OUTPATIENT
Start: 2022-08-10 | End: 2022-08-15

## 2022-08-10 NOTE — LETTER
August 10, 2022     Juanito Barry MD  [address] [facility] [address]    Patient: Roly Garcia   YOB: 1955   Date of Visit: 8/10/2022       Dear Dr Rafaela Henderson: Thank you for referring Lynnette Lutz to me for evaluation  Below are my notes for this consultation  If you have questions, please do not hesitate to call me  I look forward to following your patient along with you  Sincerely,        Tonie Ruelas MD        CC: No Recipients  Tonie Ruelas MD  8/10/2022 11:04 AM  Incomplete  Surgical Oncology Follow Up    [address]  [facility] [address] [address]  [facility]  1955  [id number]    Diagnoses and all orders for this visit:    Malignant melanoma of leg, right ([facility] [address] )  -     NM pet ct tumor imaging whole body; Future  -     MRI brain w wo contrast; Future    Cellulitis of right lower extremity  -     clindamycin (CLEOCIN) 300 MG capsule; Take 1 capsule (300 mg total) by mouth 4 (four) times a day for 5 days        Chief Complaint   Patient presents with    Follow-up       Return in about 6 months (around 2/10/2023)  Oncology History   Malignant melanoma of leg, right ([address] )   5/31/2022 Biopsy    Right Leg, Shave Biopsy   Melanoma extending to the deep specimen margin  Thickness: 7 0mm  Ulceration: not seen   Mitoses: 3      7/1/2022 Initial Diagnosis    Malignant melanoma of leg, right ([facility] [address] )     7/29/2022 Surgery    A  Lymph node, sentinel, #1:  One lymph node with rare metastatic melanoma cells (1/1), subcapsular location  Immunohistochemical study for MART-1, HMB45 and S100 supports the findings  B  Leg, right, knee, wide excision:  Residual melanoma, nodular type, and scar; margins free  See synoptic report           Staging: T4aN1a fiona of RT knee  Treatment history: postop  Current treatment: staging P  Disease status: staging P    History of Present Illness:  Patient is post wide local excision with sentinel lymph node biopsy of a right knee melanoma  Final path T for a N1a with isolated tumor cells  There is only 1 sentinel node  The patient's hospital course was complicated by sequela of his were well from this and is doing well today  He denies any significant pain or concerns about the appearance of his incision  It does feel tight at his knee and he does complain of some soreness in his in the groin  No fever, chills, other concerns  Review of Systems  Complete ROS Surg Onc:   Constitutional: The patient denies new or recent history of general fatigue, no recent weight loss, no change in appetite  Eyes: No complaints of visual problems, no scleral icterus  ENT: no complaints of ear pain, no hoarseness, no difficulty swallowing,  no tinnitus and no new masses in head, oral cavity, or neck  Cardiovascular: No complaints of chest pain, no palpitations, no ankle edema  Respiratory: No complaints of shortness of breath, no cough  Gastrointestinal: No complaints of jaundice, no bloody stools, no pale stools  Genitourinary: No complaints of dysuria, no hematuria, no nocturia, no frequent urination, no urethral discharge  Musculoskeletal: No complaints of weakness, paralysis, joint stiffness or arthralgias  Integumentary: No complaints of rash, no new lesions  Neurological: No complaints of convulsions, no seizures, no dizziness  Hematologic/Lymphatic: No complaints of easy bruising  Endocrine:  No hot or cold intolerance  No polydipsia, polyphagia, or polyuria  Allergy/immunology:  No environmental allergies  No food allergies  Not immunocompromised  Skin:  No pallor or rash  No wound          Patient Active Problem List   Diagnosis    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    Hyperbilirubinemia    Symptomatic anemia    Pulmonary embolism with acute cor pulmonale (HCC)    Portal vein thrombosis    Elevated blood pressure reading without diagnosis of hypertension    Shortness of breath    Gastroesophageal reflux disease    Autoimmune hemolytic anemia    ARABELLA (acute kidney injury) ([facility] [address] )    Splenomegaly    Iron overload due to repeated red blood cell transfusions    Posttraumatic stress disorder    Essential hypertension    Glucose intolerance (impaired glucose tolerance)    Renal insufficiency    Auto immune neutropenia (HCC)    Primary osteoarthritis of left knee    Pain in joint, lower leg    Pain in left knee    COVID-19    Thrombocytosis    Primary localized osteoarthrosis of the knee, right    Hyperglycemia    Chronic bilateral low back pain with bilateral sciatica    Hypercholesterolemia    Malignant melanoma of leg, right ([address] )    Dyspnea    Acute respiratory failure with hypoxia (HCC)    Elevated serum creatinine    Elevated bilirubin    Leukocytosis     Past Medical History:   Diagnosis Date    Anemia 11/2/2016    Anxiety     Arthritis     Autoimmune hemolytic anemia (HCC)     Claustrophobia     COVID 12/2020    DVT (deep venous thrombosis) (HCC)     GERD (gastroesophageal reflux disease)     Hearing loss, right     Hemolytic anemia (HCC)     History of transfusion     2018 - no adverse reaction    Hypertension     Malignant melanoma of leg, right ([facility] [address] ) 7/1/2022    Palpitation     Portal vein thrombosis     PTSD (post-traumatic stress disorder)     Pulmonary emboli (HCC)     Tobacco abuse      Past Surgical History:   Procedure Laterality Date    CATARACT EXTRACTION Bilateral     CHOLECYSTECTOMY  7/18/2017    COLONOSCOPY      ELBOW ARTHROPLASTY Left     bursectomy    JOINT REPLACEMENT Right 2/2/2021    knee    KNEE SURGERY Right     meniscus tear    LYMPH NODE BIOPSY Right 7/29/2022    Procedure: BIOPSY LYMPH NODE SENTINEL;  Surgeon: Ellen Suero MD;  [facility];  Service: Surgical Oncology    DC LAP,CHOLECYSTECTOMY/GRAPH N/A 12/23/2017    Procedure: CHOLECYSTECTOMY LAPAROSCOPIC with cholangiogram;  Surgeon: Liam Bautista MD;  [facility];  Service: General    KS REMOVAL SPLEEN, TOTAL N/A 5/18/2017    Procedure: LAPAROSCOPIC HAND ASSIST SPLENECTOMY;  Surgeon: Jaylen Dubon MD;  [facility];  Service: Surgical Oncology    KS TOTAL KNEE ARTHROPLASTY Right 2/2/2021    Procedure: ARTHROPLASTY KNEE TOTAL;  Surgeon: Dioni Wagner MD;  [facility];  Service: Orthopedics    KS TOTAL KNEE ARTHROPLASTY Left 11/17/2021    Procedure: TOTAL KNEE REPLACEMENT;  Surgeon: Dioni Wagner MD;  [facility];  Service: Orthopedics    SHOULDER SURGERY Left     rotator cuff x4, reconstruction    SKIN LESION EXCISION Right 7/29/2022    Procedure: WIDE EXCISION RIGHT MEDIAL THIGH;  Surgeon: Laurie Gutierrez MD;  [facility];  Service: Surgical Oncology     Family History   Problem Relation Age of Onset   Belkys Dioton Cancer Mother     Breast cancer Mother     Other Father         CABG    Heart attack Paternal Grandfather         acute MI     Social History     Socioeconomic History    Marital status: /Civil Union     Spouse name: Not on file    Number of children: Not on file    Years of education: Not on file    Highest education level: Not on file   Occupational History    Not on file   Tobacco Use    Smoking status: Current Some Day Smoker     Packs/day: 0 00     Years: 5 00     Pack years: 0 00     Types: Cigars    Smokeless tobacco: Current User     Types: Snuff    Tobacco comment: 5  a week average   Vaping Use    Vaping Use: Never used   Substance and Sexual Activity    Alcohol use:  Yes     Alcohol/week: 6 0 standard drinks     Types: 6 Cans of beer per week     Comment: socially    Drug use: Yes     Frequency: 3 0 times per week     Types: Marijuana     Comment: medical marijuana    Sexual activity: Yes     Partners: Female     Birth control/protection: None   Other Topics Concern    Not on file   Social History Narrative    Daily coffee consumption (2 cups/day)    reitred     Social Determinants of Health     Financial Resource Strain: Not on file   Food Insecurity: No Food Insecurity    Worried About Running Out of Food in the Last Year: Never true    Michelle of Food in the Last Year: Never true   Transportation Needs: No Transportation Needs    Lack of Transportation (Medical): No    Lack of Transportation (Non-Medical):  No   Physical Activity: Not on file   Stress: Not on file   Social Connections: Not on file   Intimate Partner Violence: Not on file   Housing Stability: Low Risk     Unable to Pay for Housing in the Last Year: No    Number of Places Lived in the Last Year: 1    Unstable Housing in the Last Year: No       Current Outpatient Medications:     clindamycin (CLEOCIN) 300 MG capsule, Take 1 capsule (300 mg total) by mouth 4 (four) times a day for 5 days, Disp: 20 capsule, Rfl: 0    predniSONE 20 mg tablet, Take 2 tablets (40 mg total) by mouth daily, Disp: 60 tablet, Rfl: 0    acetaminophen (TYLENOL) 325 mg tablet, Take 2 tablets (650 mg total) by mouth every 6 (six) hours as needed for mild pain, Disp:  , Rfl: 0    ascorbic acid (VITAMIN C) 1000 MG tablet, Take 1 tablet (1,000 mg total) by mouth every 12 (twelve) hours for 6 doses (Patient taking differently: Take 1,000 mg by mouth daily Every other day), Disp: 6 tablet, Rfl: 0    benzonatate (TESSALON PERLES) 100 mg capsule, Take 1 capsule (100 mg total) by mouth 3 (three) times a day, Disp: 20 capsule, Rfl: 0    dabigatran etexilate (PRADAXA) 150 mg capsu, Take 1 capsule (150 mg total) by mouth every 12 (twelve) hours, Disp: 60 capsule, Rfl: 0    hydrochlorothiazide (HYDRODIURIL) 25 mg tablet, Take 1 tablet (25 mg total) by mouth daily (Patient taking differently: Take 25 mg by mouth every morning), Disp: 30 tablet, Rfl: 5    metoprolol succinate (TOPROL-XL) 25 mg 24 hr tablet, Take 0 5 tablets (12 5 mg total) by mouth daily (Patient taking differently: Take 12 5 mg by mouth every morning), Disp: 30 tablet, Rfl: 5    pantoprazole (PROTONIX) 40 mg tablet, Take 1 tablet (40 mg total) by mouth daily (Patient taking differently: Take 40 mg by mouth every morning), Disp: 90 tablet, Rfl: 3    potassium chloride (K-DUR,KLOR-CON) 20 mEq tablet, Take 1 tablet (20 mEq total) by mouth daily (Patient taking differently: Take 20 mEq by mouth every morning), Disp: 30 tablet, Rfl: 3    prazosin (MINIPRESS) 1 mg capsule, Take 1 mg by mouth daily at bedtime , Disp: , Rfl:     sulfamethoxazole-trimethoprim (BACTRIM DS) 800-160 mg per tablet, Take 1 tablet by mouth 3 (three) times a week Monday, Wednesday and Friday, Disp: 12 tablet, Rfl: 0    traMADol (Ultram) 50 mg tablet, Take 1 tablet (50 mg total) by mouth every 6 (six) hours as needed for moderate pain, Disp: 10 tablet, Rfl: 0    VITAMIN D PO, Take 1,000 Units by mouth daily , Disp: , Rfl:   No current facility-administered medications for this visit  No Known Allergies  Vitals:    08/10/22 0933   BP: 130/80       Physical Exam  Constitutional: Patient is well-developed and well-nourished who appears the stated age in no acute distress  Patient is pleasant and talkative  HEENT:  Normocephalic  Sclerae are anicteric  Mucous membranes are moist  Neck is supple without adenopathy  No JVD  Chest: Equal chest rise, easy WOB  Cardiac: Heart is regular rate  Abdomen: Abdomen is non-tender, non-distended and without masses  Extremities: There is no clubbing or cyanosis  There is no edema  Symmetric  Neuro: Grossly nonfocal  Gait is normal      Lymphatic: No evidence of cervical adenopathy bilaterally  No evidence of axillary adenopathy bilaterally  No evidence of inguinal adenopathy bilaterally  Skin: Warm, anicteric  Groin and knee incisions with mild erythema  Psych:  Patient is pleasant and talkative      Pathology:  MELANOMA OF THE SKIN: Excision, Re-Excision  8th Edition - Protocol posted: 11/10/2021  MELANOMA OF THE SKIN: EXCISION, RE-EXCISION  - All Specimens  SPECIMEN   Procedure  Excision    Specimen Laterality  Right    TUMOR   Tumor Site  Skin of lower limb and hip: right knee         Histologic Type  Nodular melanoma    Maximum Tumor (Breslow) Thickness (Millimeters)  8 mm   Macroscopic Satellite Nodule(s)  Not identified    Ulceration  Not identified    Anatomic (Allen) Level  V (Melanoma invades subcutis)    Mitotic Rate  4 mitoses per mm2   Microsatellite(s)  Not identified    Lymphovascular Invasion  Not identified    Neurotropism  Not identified    Tumor-Infiltrating Lymphocytes  Present, nonbrisk    MARGINS     Margin Status for Invasive Melanoma  All margins negative for invasive melanoma    Margin Status for Melanoma in situ  All margins negative for melanoma in situ    REGIONAL LYMPH NODES     Regional Lymph Node Status  Tumor present in regional lymph node(s)    Total Number of Lymph Nodes with Tumor  1    Number of [place] Lymph Nodes with Tumor  1    Size of Largest [place] Node Metastatic Deposit  Rare melanoma cells identified on Melan-A and HMB45 stains      Size of Largest Junior Metastatic Deposit  Rare melanoma cells identified on Melan-A and HMB45 stains      Extranodal Extension  Not identified    Matted Nodes  Not identified    Total Number of Lymph Nodes Examined  1    Number of [place] Nodes Examined  1    PATHOLOGIC STAGE CLASSIFICATION (pTNM, AJCC 8th Edition)  Classification assigned in this report includes information from a prior procedure: See previous biopsy report ([id number])    pT Category  pT4a    pN Category  pN1a      Labs:  Reviewed in [employer] personally    Imaging  XR chest portable    Result Date: 7/31/2022  Narrative: CHEST INDICATION:   Hypoxemia  COMPARISON:  7/29/2022 EXAM PERFORMED/VIEWS:  XR CHEST PORTABLE FINDINGS: Cardiomediastinal silhouette appears unremarkable  The lungs are clear  No pneumothorax or pleural effusion   Osseous structures appear within normal limits for patient age  Impression: No acute cardiopulmonary disease  Workstation performed: [id number]     XR chest portable    Result Date: 7/29/2022  Narrative: CHEST INDICATION:   Shortness of breath  COMPARISON:  12/18/2020  EXAM PERFORMED/VIEWS:  XR CHEST PORTABLE FINDINGS:  Monitoring leads and clips project over the chest  Cardiomediastinal silhouette appears stable  The lungs are clear  No pneumothorax or pleural effusion  Degenerative changes  Left upper quadrant embolization coils  Impression: No acute cardiopulmonary disease  Workstation performed: [id number]     NM lymphatic melanoma    Result Date: 7/29/2022  Narrative: SENTINEL NODE LYMPHOSCINTIGRAPHY INDICATION:   Lower extremity melanoma  FINDINGS: 0 55 mCi Tc-99m sulfur colloid (0 6 cc volume) was administered intradermally in divided doses by Dr Baldemar Stone in the region of the patients right lower extremity melanoma  Scintigraphic images were obtained over the right groin and right lower extremity in multiple projections  Single node is identified within the right groin Using scintigraphic guidance, the corresponding skin site was marked with an indelible marker  The patient was transferred to the operating room in satisfactory condition  Impression: 1  [place] lymph node localized to right inguinal region  Workstation performed: [id number]     CTA chest pe study    Result Date: 7/31/2022  Narrative: CTA - CHEST WITH IV CONTRAST - PULMONARY ANGIOGRAM INDICATION:   tachypnea, tachycardia, R/O PE  COMPARISON: 4/30/2022  TECHNIQUE: CTA examination of the chest was performed using angiographic technique according to a protocol specifically tailored to evaluate for pulmonary embolism  Axial, sagittal, and coronal 2D reformatted images were created from the source data and  submitted for interpretation  In addition, coronal 3D MIP postprocessing was performed on the acquisition scanner    Radiation dose length product (DLP) for this visit:  572 mGy-cm   This examination, like all CT scans performed in the [facility], was performed utilizing techniques to minimize radiation dose exposure, including the use of iterative reconstruction and automated exposure control  IV Contrast:  70 mL of iohexol (OMNIPAQUE)  FINDINGS: PULMONARY ARTERIAL TREE:  Scattered segmental/subsegmental pulmonary emboli are seen throughout the lung    LUNGS:  Scattered groundglass opacities suggests a small vessel or small airways process process  There is no tracheal or endobronchial lesion  PLEURA:  Unremarkable  HEART/GREAT VESSELS:  RV LV ratio is greater than 0 9 concerning for right ventricular strain    No thoracic aortic aneurysm  MEDIASTINUM AND BRENDA:  Unremarkable  CHEST WALL AND LOWER NECK:   Unremarkable  VISUALIZED STRUCTURES IN THE UPPER ABDOMEN:  Unremarkable  OSSEOUS STRUCTURES:  No acute fracture or destructive osseous lesion  Impression: Right middle lobe pulmonary embolism are noted    RV LV ratio is greater than 0 9 concerning for right ventricular strain     I personally discussed this study with Lilliam Lujan on 7/31/2022 at 2:17 AM  Workstation performed: [id number]     VAS lower limb venous duplex study, complete bilateral    Result Date: 8/1/2022  Narrative:  [facility] REPORT CLINICAL: Indications: PT C/O PE  He is currently receiving IV heparin  Physician wants to rule out B/L LE DVT  Operative History: No prior heart or vascular surgery Risk Factors The patient has history of HTN, current smoking, GERD, autoimmune hemolytic anemia, malignant melanoma right leg, COVID 19 virus (12/20), PE, portal vein thrombus, PTSD, and DVT  Height:  67 inches  Weight:  192 lbs  CONCLUSION:  Impression:  RIGHT LOWER LIMB: Based on color flow imaging, evaluation shows no evidence of acute deep vein thrombosis   The common femoral artery and sapheno-femoral junction were not evaluated secondary to lymph node removal at groin/pain  Tech note: A non-vascular structure measuring 3 47 cm was identified at the groin at the lymph node removal site  No evidence of superficial thrombophlebitis noted  Doppler evaluation shows a normal response to augmentation maneuvers  Popliteal, posterior tibial, and anterior tibial arterial Doppler waveforms are triphasic  LEFT LOWER LIMB: No evidence of acute or chronic deep vein thrombosis  No evidence of superficial thrombophlebitis noted  Doppler evaluation shows a normal response to augmentation maneuvers  Popliteal, posterior tibial, and anterior tibial arterial Doppler waveforms are triphasic  Technical findings were posted on EPIC  SIGNATURE: Electronically Signed by: Bridgette Katz on 2022-08-01 09:28:42 AM    US bedside procedure    Result Date: 7/31/2022  Narrative: 1 2 [id number] 2 323 [id number] 2    Echo complete w/ contrast if indicated    Result Date: 7/31/2022  Narrative: Eli  Left Ventricle: Left ventricular cavity size is normal  Wall thickness is mildly increased  The left ventricular ejection fraction is 70%  Systolic function is hyperdynamic  Wall motion is normal    Right Ventricle: Right ventricular cavity size is dilated  Systolic function is mildly reduced    Tricuspid Valve: There is moderate to severe regurgitation  The right ventricular systolic pressure is severely elevated  The estimated right ventricular systolic pressure is 80 mmHg  I reviewed the above laboratory and imaging data  Discussion/Summary: Pt is s/p WLE + SLN for T4aN1a melanoma of right knee  Path reviewed  The patient will proceed to a staging evaluation with PET scan as well as brain MRI  He he also has a follow-up appointment with Dr Danny Fajardo  This was discussed with Dr Paula Levin, who sees him for his hemolytic anemia    We also  dicussed surveillance with q3 mo visits with derm for skin checks as well as q6 mo visits with me for repeat PE  Discussed vigilance about new skin lesions in this region or new lumps/bumps in LN basins  Discussed need for continued sun protection with sunscreen, hats, minimizing sun exposure  Patient expressed understanding and endorsement

## 2022-08-10 NOTE — PROGRESS NOTES
Surgical Oncology Follow Up    [address] [facility] [place]  [address] [facility] [address]  [place] [id number] [phone]    Kee Nicole  1955  [id number]    Diagnoses and all orders for this visit:    Malignant melanoma of leg, right ([facility] )  -     NM pet ct tumor imaging whole body; Future  -     MRI brain w wo contrast; Future    Cellulitis of right lower extremity  -     clindamycin (CLEOCIN) 300 MG capsule; Take 1 capsule (300 mg total) by mouth 4 (four) times a day for 5 days        Chief Complaint   Patient presents with    Follow-up       Return in about 6 months (around 2/10/2023)  Oncology History   Malignant melanoma of leg, right ([facility] )   5/31/2022 Biopsy    Right Leg, Shave Biopsy   Melanoma extending to the deep specimen margin  Thickness: 7 0mm  Ulceration: not seen   Mitoses: 3      7/1/2022 Initial Diagnosis    Malignant melanoma of leg, right ([facility] )     7/29/2022 Surgery    A  Lymph node, sentinel, #1:  One lymph node with rare metastatic melanoma cells (1/1), subcapsular location  Immunohistochemical study for MART-1, HMB45 and S100 supports the findings  B  Leg, right, knee, wide excision:  Residual melanoma, nodular type, and scar; margins free  See synoptic report  Staging: T4aN1a fiona of RT knee  Treatment history: postop  Current treatment: staging P  Disease status: staging P    History of Present Illness:  Patient is post wide local excision with sentinel lymph node biopsy of a right knee melanoma  Final path T for a N1a with isolated tumor cells  There is only 1 sentinel node  The patient's hospital course was complicated by sequela of his were well from this and is doing well today  He denies any significant pain or concerns about the appearance of his incision  It does feel tight at his knee and he does complain of some soreness in his in the groin  No fever, chills, other concerns      Review of Systems  Complete ROS Surg Onc:   Constitutional: The patient denies new or recent history of general fatigue, no recent weight loss, no change in appetite  Eyes: No complaints of visual problems, no scleral icterus  ENT: no complaints of ear pain, no hoarseness, no difficulty swallowing,  no tinnitus and no new masses in head, oral cavity, or neck  Cardiovascular: No complaints of chest pain, no palpitations, no ankle edema  Respiratory: No complaints of shortness of breath, no cough  Gastrointestinal: No complaints of jaundice, no bloody stools, no pale stools  Genitourinary: No complaints of dysuria, no hematuria, no nocturia, no frequent urination, no urethral discharge  Musculoskeletal: No complaints of weakness, paralysis, joint stiffness or arthralgias  Integumentary: No complaints of rash, no new lesions  Neurological: No complaints of convulsions, no seizures, no dizziness  Hematologic/Lymphatic: No complaints of easy bruising  Endocrine:  No hot or cold intolerance  No polydipsia, polyphagia, or polyuria  Allergy/immunology:  No environmental allergies  No food allergies  Not immunocompromised  Skin:  No pallor or rash  No wound          Patient Active Problem List   Diagnosis    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    Hyperbilirubinemia    Symptomatic anemia    Pulmonary embolism with acute cor pulmonale (HCC)    Portal vein thrombosis    Elevated blood pressure reading without diagnosis of hypertension    Shortness of breath    Gastroesophageal reflux disease    Autoimmune hemolytic anemia    ARABELLA (acute kidney injury) ([facility] )    Splenomegaly    Iron overload due to repeated red blood cell transfusions    Posttraumatic stress disorder    Essential hypertension    Glucose intolerance (impaired glucose tolerance)    Renal insufficiency    Auto immune neutropenia (HCC)    Primary osteoarthritis of left knee    Pain in joint, lower leg    Pain in left knee    COVID-19    Thrombocytosis    Primary localized osteoarthrosis of the knee, right    Hyperglycemia    Chronic bilateral low back pain with bilateral sciatica    Hypercholesterolemia    Malignant melanoma of leg, right (HCC)    Dyspnea    Acute respiratory failure with hypoxia (HCC)    Elevated serum creatinine    Elevated bilirubin    Leukocytosis     Past Medical History:   Diagnosis Date    Anemia 11/2/2016    Anxiety     Arthritis     Autoimmune hemolytic anemia ([address] )     Claustrophobia     COVID 12/2020    DVT (deep venous thrombosis) (HCC)     GERD (gastroesophageal reflux disease)     Hearing loss, right     Hemolytic anemia ([address] )     History of transfusion     2018 - no adverse reaction    Hypertension     Malignant melanoma of leg, right ([address] ) 7/1/2022    Palpitation     Portal vein thrombosis     PTSD (post-traumatic stress disorder)     Pulmonary emboli (HCC)     Tobacco abuse      Past Surgical History:   Procedure Laterality Date    CATARACT EXTRACTION Bilateral     CHOLECYSTECTOMY  7/18/2017    COLONOSCOPY      ELBOW ARTHROPLASTY Left     bursectomy    JOINT REPLACEMENT Right 2/2/2021    knee    KNEE SURGERY Right     meniscus tear    LYMPH NODE BIOPSY Right 7/29/2022    Procedure: BIOPSY LYMPH NODE SENTINEL;  Surgeon: Narendra Moyer MD;  [facility];  Service: Surgical Oncology    ME LAP,CHOLECYSTECTOMY/GRAPH N/A 12/23/2017    Procedure: CHOLECYSTECTOMY LAPAROSCOPIC with cholangiogram;  Surgeon: Rikki Otto MD;  [facility];  Service: General    ME REMOVAL SPLEEN, TOTAL N/A 5/18/2017    Procedure: LAPAROSCOPIC HAND ASSIST SPLENECTOMY;  Surgeon: Brittany Hunter MD;  [facility];  Service: Surgical Oncology    ME TOTAL KNEE ARTHROPLASTY Right 2/2/2021    Procedure: ARTHROPLASTY KNEE TOTAL;  Surgeon: Vicky Apodaca MD;  [facility];  Service: Orthopedics    ME TOTAL KNEE ARTHROPLASTY Left 11/17/2021    Procedure: TOTAL KNEE REPLACEMENT;  Surgeon: Collins Rivera MD;  [facility];  Service: Orthopedics    SHOULDER SURGERY Left     rotator cuff x4, reconstruction    SKIN LESION EXCISION Right 7/29/2022    Procedure: WIDE EXCISION RIGHT MEDIAL THIGH;  Surgeon: Romina Nicholson MD;  Location:  MAIN OR;  Service: Surgical Oncology     Family History   Problem Relation Age of Onset   [facility] Mother     Breast cancer Mother     Other Father         CABG    Heart attack Paternal Grandfather         acute MI     Social History     Socioeconomic History    Marital status: /Civil Union     Spouse name: Not on file    Number of children: Not on file    Years of education: Not on file    Highest education level: Not on file   Occupational History    Not on file   Tobacco Use    Smoking status: Current Some Day Smoker     Packs/day: 0 00     Years: 5 00     Pack years: 0 00     Types: Cigars    Smokeless tobacco: Current User     Types: Snuff    Tobacco comment: 5  a week average   Vaping Use    Vaping Use: Never used   Substance and Sexual Activity    Alcohol use: Yes     Alcohol/week: 6 0 standard drinks     Types: 6 Cans of beer per week     Comment: socially    Drug use: Yes     Frequency: 3 0 times per week     Types: Marijuana     Comment: medical marijuana    Sexual activity: Yes     Partners: Female     Birth control/protection: None   Other Topics Concern    Not on file   Social History Narrative    Daily coffee consumption (2 cups/day)    reitred     Social Determinants of Health     Financial Resource Strain: Not on file   Food Insecurity: No Food Insecurity    Worried About Running Out of Food in the Last Year: Never true    Michelle of Food in the Last Year: Never true   Transportation Needs: No Transportation Needs    Lack of Transportation (Medical): No    Lack of Transportation (Non-Medical):  No   Physical Activity: Not on file   Stress: Not on file   Social Connections: Not on file Intimate Partner Violence: Not on file   Housing Stability: Low Risk     Unable to Pay for Housing in the Last Year: No    Number of Places Lived in the Last Year: 1    Unstable Housing in the Last Year: No       Current Outpatient Medications:     clindamycin (CLEOCIN) 300 MG capsule, Take 1 capsule (300 mg total) by mouth 4 (four) times a day for 5 days, Disp: 20 capsule, Rfl: 0    predniSONE 20 mg tablet, Take 2 tablets (40 mg total) by mouth daily, Disp: 60 tablet, Rfl: 0    acetaminophen (TYLENOL) 325 mg tablet, Take 2 tablets (650 mg total) by mouth every 6 (six) hours as needed for mild pain, Disp:  , Rfl: 0    ascorbic acid (VITAMIN C) 1000 MG tablet, Take 1 tablet (1,000 mg total) by mouth every 12 (twelve) hours for 6 doses (Patient taking differently: Take 1,000 mg by mouth daily Every other day), Disp: 6 tablet, Rfl: 0    benzonatate (TESSALON PERLES) 100 mg capsule, Take 1 capsule (100 mg total) by mouth 3 (three) times a day, Disp: 20 capsule, Rfl: 0    dabigatran etexilate (PRADAXA) 150 mg capsu, Take 1 capsule (150 mg total) by mouth every 12 (twelve) hours, Disp: 60 capsule, Rfl: 0    hydrochlorothiazide (HYDRODIURIL) 25 mg tablet, Take 1 tablet (25 mg total) by mouth daily (Patient taking differently: Take 25 mg by mouth every morning), Disp: 30 tablet, Rfl: 5    metoprolol succinate (TOPROL-XL) 25 mg 24 hr tablet, Take 0 5 tablets (12 5 mg total) by mouth daily (Patient taking differently: Take 12 5 mg by mouth every morning), Disp: 30 tablet, Rfl: 5    pantoprazole (PROTONIX) 40 mg tablet, Take 1 tablet (40 mg total) by mouth daily (Patient taking differently: Take 40 mg by mouth every morning), Disp: 90 tablet, Rfl: 3    potassium chloride (K-DUR,KLOR-CON) 20 mEq tablet, Take 1 tablet (20 mEq total) by mouth daily (Patient taking differently: Take 20 mEq by mouth every morning), Disp: 30 tablet, Rfl: 3    prazosin (MINIPRESS) 1 mg capsule, Take 1 mg by mouth daily at bedtime , Disp: , Rfl:     sulfamethoxazole-trimethoprim (BACTRIM DS) 800-160 mg per tablet, Take 1 tablet by mouth 3 (three) times a week Monday, Wednesday and Friday, Disp: 12 tablet, Rfl: 0    traMADol (Ultram) 50 mg tablet, Take 1 tablet (50 mg total) by mouth every 6 (six) hours as needed for moderate pain, Disp: 10 tablet, Rfl: 0    VITAMIN D PO, Take 1,000 Units by mouth daily , Disp: , Rfl:   No current facility-administered medications for this visit  No Known Allergies  Vitals:    08/10/22 0933   BP: 130/80       Physical Exam  Constitutional: Patient is well-developed and well-nourished who appears the stated age in no acute distress  Patient is pleasant and talkative  HEENT:  Normocephalic  Sclerae are anicteric  Mucous membranes are moist  Neck is supple without adenopathy  No JVD  Chest: Equal chest rise, easy WOB  Cardiac: Heart is regular rate  Abdomen: Abdomen is non-tender, non-distended and without masses  Extremities: There is no clubbing or cyanosis  There is no edema  Symmetric  Neuro: Grossly nonfocal  Gait is normal      Lymphatic: No evidence of cervical adenopathy bilaterally  No evidence of axillary adenopathy bilaterally  No evidence of inguinal adenopathy bilaterally  Skin: Warm, anicteric  Groin and knee incisions with mild erythema  Psych:  Patient is pleasant and talkative      Pathology:  MELANOMA OF THE SKIN: Excision, Re-Excision  8th Edition - Protocol posted: 11/10/2021  MELANOMA OF THE SKIN: EXCISION, RE-EXCISION  - All Specimens  SPECIMEN   Procedure  Excision    Specimen Laterality  Right    TUMOR   Tumor Site  Skin of lower limb and hip: right knee         Histologic Type  Nodular melanoma    Maximum Tumor (Breslow) Thickness (Millimeters)  8 mm   Macroscopic Satellite Nodule(s)  Not identified    Ulceration  Not identified    Anatomic (Allen) Level  V (Melanoma invades subcutis)    Mitotic Rate  4 mitoses per mm2   Microsatellite(s)  Not identified Lymphovascular Invasion  Not identified    Neurotropism  Not identified    Tumor-Infiltrating Lymphocytes  Present, nonbrisk    MARGINS     Margin Status for Invasive Melanoma  All margins negative for invasive melanoma    Margin Status for Melanoma in situ  All margins negative for melanoma in situ    REGIONAL LYMPH NODES     Regional Lymph Node Status  Tumor present in regional lymph node(s)    Total Number of Lymph Nodes with Tumor  1    Number of [place] Lymph Nodes with Tumor  1    Size of Largest [place] Node Metastatic Deposit  Rare melanoma cells identified on Melan-A and HMB45 stains      Size of Largest Junior Metastatic Deposit  Rare melanoma cells identified on Melan-A and HMB45 stains      Extranodal Extension  Not identified    Matted Nodes  Not identified    Total Number of Lymph Nodes Examined  1    Number of [place] Nodes Examined  1    PATHOLOGIC STAGE CLASSIFICATION (pTNM, AJCC 8th Edition)  Classification assigned in this report includes information from a prior procedure: See previous biopsy report ([id number])    pT Category  pT4a    pN Category  pN1a      Labs:  Reviewed in Epic personally    Imaging  XR chest portable    Result Date: 7/31/2022  Narrative: CHEST INDICATION:   Hypoxemia  COMPARISON:  7/29/2022 EXAM PERFORMED/VIEWS:  XR CHEST PORTABLE FINDINGS: Cardiomediastinal silhouette appears unremarkable  The lungs are clear  No pneumothorax or pleural effusion  Osseous structures appear within normal limits for patient age  Impression: No acute cardiopulmonary disease  Workstation performed: [id number]     XR chest portable    Result Date: 7/29/2022  Narrative: CHEST INDICATION:   Shortness of breath  COMPARISON:  12/18/2020  EXAM PERFORMED/VIEWS:  XR CHEST PORTABLE FINDINGS:  Monitoring leads and clips project over the chest  Cardiomediastinal silhouette appears stable  The lungs are clear  No pneumothorax or pleural effusion  Degenerative changes    Left upper quadrant embolization coils  Impression: No acute cardiopulmonary disease  Workstation performed: [id number]     NM lymphatic melanoma    Result Date: 7/29/2022  Narrative: SENTINEL NODE LYMPHOSCINTIGRAPHY INDICATION:   Lower extremity melanoma  FINDINGS: 0 55 mCi Tc-99m sulfur colloid (0 6 cc volume) was administered intradermally in divided doses by Dr Rafiq Villarreal in the region of the patients right lower extremity melanoma  Scintigraphic images were obtained over the right groin and right lower extremity in multiple projections  Single node is identified within the right groin Using scintigraphic guidance, the corresponding skin site was marked with an indelible marker  The patient was transferred to the operating room in satisfactory condition  Impression: 1  [place] lymph node localized to right inguinal region  Workstation performed: [id number]     CTA chest pe study    Result Date: 7/31/2022  Narrative: CTA - CHEST WITH IV CONTRAST - PULMONARY ANGIOGRAM INDICATION:   tachypnea, tachycardia, R/O PE  COMPARISON: 4/30/2022  TECHNIQUE: CTA examination of the chest was performed using angiographic technique according to a protocol specifically tailored to evaluate for pulmonary embolism  Axial, sagittal, and coronal 2D reformatted images were created from the source data and  submitted for interpretation  In addition, coronal 3D MIP postprocessing was performed on the acquisition scanner  Radiation dose length product (DLP) for this visit:  572 mGy-cm   This examination, like all CT scans performed in the [facility], was performed utilizing techniques to minimize radiation dose exposure, including the use of iterative reconstruction and automated exposure control  IV Contrast:  70 mL of iohexol (OMNIPAQUE)  FINDINGS: PULMONARY ARTERIAL TREE:  Scattered segmental/subsegmental pulmonary emboli are seen throughout the lung    LUNGS:  Scattered groundglass opacities suggests a small vessel or small airways process process  There is no tracheal or endobronchial lesion  PLEURA:  Unremarkable  HEART/GREAT VESSELS:  RV LV ratio is greater than 0 9 concerning for right ventricular strain    No thoracic aortic aneurysm  MEDIASTINUM AND BRENDA:  Unremarkable  CHEST WALL AND LOWER NECK:   Unremarkable  VISUALIZED STRUCTURES IN THE UPPER ABDOMEN:  Unremarkable  OSSEOUS STRUCTURES:  No acute fracture or destructive osseous lesion  Impression: Right middle lobe pulmonary embolism are noted    RV LV ratio is greater than 0 9 concerning for right ventricular strain     I personally discussed this study with [employer] on 7/31/2022 at 2:17 AM  Workstation performed: [id number]     VAS lower limb venous duplex study, complete bilateral    Result Date: 8/1/2022  Narrative:  [facility] REPORT CLINICAL: Indications: PT C/O PE  He is currently receiving IV heparin  Physician wants to rule out B/L LE DVT  Operative History: No prior heart or vascular surgery Risk Factors The patient has history of HTN, current smoking, GERD, autoimmune hemolytic anemia, malignant melanoma right leg, COVID 19 virus (12/20), PE, portal vein thrombus, PTSD, and DVT  Height:  67 inches  Weight:  192 lbs  CONCLUSION:  Impression:  RIGHT LOWER LIMB: Based on color flow imaging, evaluation shows no evidence of acute deep vein thrombosis  The common femoral artery and sapheno-femoral junction were not evaluated secondary to lymph node removal at groin/pain  Tech note: A non-vascular structure measuring 3 47 cm was identified at the groin at the lymph node removal site  No evidence of superficial thrombophlebitis noted  Doppler evaluation shows a normal response to augmentation maneuvers  Popliteal, posterior tibial, and anterior tibial arterial Doppler waveforms are triphasic  LEFT LOWER LIMB: No evidence of acute or chronic deep vein thrombosis  No evidence of superficial thrombophlebitis noted   Doppler evaluation shows a normal response to augmentation maneuvers  Popliteal, posterior tibial, and anterior tibial arterial Doppler waveforms are triphasic  Technical findings were posted on EPIC  SIGNATURE: Electronically Signed by: Marisabel Fernando on 2022-08-01 09:28:42 AM    US bedside procedure    Result Date: 7/31/2022  Narrative: 1 2 [id number] 2 323 [id number] 2    Echo complete w/ contrast if indicated    Result Date: 7/31/2022  Narrative: [facility]  Left Ventricle: Left ventricular cavity size is normal  Wall thickness is mildly increased  The left ventricular ejection fraction is 70%  Systolic function is hyperdynamic  Wall motion is normal    Right Ventricle: Right ventricular cavity size is dilated  Systolic function is mildly reduced    Tricuspid Valve: There is moderate to severe regurgitation  The right ventricular systolic pressure is severely elevated  The estimated right ventricular systolic pressure is 80 mmHg  I reviewed the above laboratory and imaging data  Discussion/Summary: Pt is s/p WLE + SLN for T4aN1a melanoma of right knee  Path reviewed  The patient will proceed to a staging evaluation with PET scan as well as brain MRI  He he also has a follow-up appointment with Dr Margaret Steve  This was discussed with Dr Anjel Patterson, who sees him for his hemolytic anemia  We also  dicussed surveillance with q3 mo visits with derm for skin checks as well as q6 mo visits with me for repeat PE  Discussed vigilance about new skin lesions in this region or new lumps/bumps in LN basins  Discussed need for continued sun protection with sunscreen, hats, minimizing sun exposure  Patient expressed understanding and endorsement

## 2022-08-15 ENCOUNTER — APPOINTMENT (OUTPATIENT)
Dept: LAB | Facility: MEDICAL CENTER | Age: 67
End: 2022-08-15
Payer: MEDICARE

## 2022-08-16 ENCOUNTER — HOSPITAL ENCOUNTER (OUTPATIENT)
Dept: INFUSION CENTER | Facility: CLINIC | Age: 67
Discharge: HOME | End: 2022-08-16
Payer: MEDICARE

## 2022-08-16 VITALS
SYSTOLIC BLOOD PRESSURE: 154 MMHG | BODY MASS INDEX: 30.17 KG/M2 | WEIGHT: 192.24 LBS | HEART RATE: 101 BPM | DIASTOLIC BLOOD PRESSURE: 82 MMHG | RESPIRATION RATE: 16 BRPM | TEMPERATURE: 87.2 F | HEIGHT: 67 IN

## 2022-08-16 DIAGNOSIS — D59.10 AUTOIMMUNE HEMOLYTIC ANEMIA (HCC): Primary | ICD-10-CM

## 2022-08-16 PROCEDURE — 96367 TX/PROPH/DG ADDL SEQ IV INF: CPT

## 2022-08-16 PROCEDURE — 96413 CHEMO IV INFUSION 1 HR: CPT

## 2022-08-16 PROCEDURE — 96415 CHEMO IV INFUSION ADDL HR: CPT

## 2022-08-16 RX ORDER — SODIUM CHLORIDE 9 MG/ML
20 INJECTION, SOLUTION INTRAVENOUS ONCE
Status: COMPLETED | OUTPATIENT
Start: 2022-08-16 | End: 2022-08-16

## 2022-08-16 RX ORDER — ACETAMINOPHEN 325 MG/1
650 TABLET ORAL ONCE
Status: CANCELLED | OUTPATIENT
Start: 2022-08-23

## 2022-08-16 RX ORDER — ACETAMINOPHEN 325 MG/1
650 TABLET ORAL ONCE
Status: DISCONTINUED | OUTPATIENT
Start: 2022-08-16 | End: 2022-08-19 | Stop reason: HOSPADM

## 2022-08-16 RX ORDER — SODIUM CHLORIDE 9 MG/ML
20 INJECTION, SOLUTION INTRAVENOUS ONCE
Status: CANCELLED | OUTPATIENT
Start: 2022-08-23

## 2022-08-16 RX ADMIN — DIPHENHYDRAMINE HYDROCHLORIDE 25 MG: 50 INJECTION, SOLUTION INTRAMUSCULAR; INTRAVENOUS at 11:22

## 2022-08-16 RX ADMIN — SODIUM CHLORIDE 20 ML/HR: 0.9 INJECTION, SOLUTION INTRAVENOUS at 11:15

## 2022-08-16 RX ADMIN — RITUXIMAB 746.2 MG: 10 INJECTION, SOLUTION INTRAVENOUS at 11:57

## 2022-08-16 NOTE — PROGRESS NOTES
Pt  Denies new symptoms or concerns today  Labs reviewed  There are no parameters  Rituxan ordered for infusion today  This is week 3  Rt  Leg with incision from melanoma surgery  Rt leg is swollen  Pt  Reports one lymph node removed and swelling is not new  Pt  Denies pain in leg  Pt  Will see surgeon next week

## 2022-08-22 ENCOUNTER — OFFICE VISIT (OUTPATIENT)
Dept: HEMATOLOGY ONCOLOGY | Facility: CLINIC | Age: 67
End: 2022-08-22
Payer: MEDICARE

## 2022-08-22 VITALS
RESPIRATION RATE: 18 BRPM | BODY MASS INDEX: 29.66 KG/M2 | HEART RATE: 80 BPM | HEIGHT: 67 IN | DIASTOLIC BLOOD PRESSURE: 78 MMHG | OXYGEN SATURATION: 98 % | WEIGHT: 189 LBS | SYSTOLIC BLOOD PRESSURE: 148 MMHG | TEMPERATURE: 98 F

## 2022-08-22 DIAGNOSIS — D59.10 AUTOIMMUNE HEMOLYTIC ANEMIA (HCC): ICD-10-CM

## 2022-08-22 DIAGNOSIS — C43.71 MALIGNANT MELANOMA OF RIGHT LOWER EXTREMITY INCLUDING HIP (HCC): Primary | ICD-10-CM

## 2022-08-22 DIAGNOSIS — C43.71 MALIGNANT MELANOMA OF LEG, RIGHT (HCC): ICD-10-CM

## 2022-08-22 PROCEDURE — 99215 OFFICE O/P EST HI 40 MIN: CPT | Performed by: INTERNAL MEDICINE

## 2022-08-22 NOTE — LETTER
August 25, 2022     Martha Max, [address] [facility] [address]  [address] [place] [address]    Patient: Sergei Rubio   YOB: 1955   Date of Visit: 8/22/2022       Dear Dr Izzy Amador: Thank you for referring Anni Asher to me for evaluation  Below are my notes for this consultation  If you have questions, please do not hesitate to call me  I look forward to following your patient along with you  Sincerely,        Srinivas Patel MD        CC: Natanael Leach, MD James Das DO Liana Pall, MD Terrance Leu, MD  8/25/2022 12:46 AM  Sign when Signing Visit  [address] [place] [address]  [phone]  [phone]     Date of Visit: 8/22/2022  Name: Sergei Rubio   YOB: 1955        Subjective    VISIT DIAGNOSIS:  Diagnoses and all orders for this visit:    Malignant melanoma of right lower extremity including hip (HCC)  -     CBC and differential; Future  -     Comprehensive metabolic panel; Future  -     LD,Blood; Future    Malignant melanoma of leg, right (HCC)    Autoimmune hemolytic anemia (HCC)        Oncology History   Malignant melanoma of leg, right ([address] )   5/31/2022 Biopsy    Right Leg, Shave Biopsy   Melanoma extending to the deep specimen margin  Thickness: 7 0mm  Ulceration: not seen   Mitoses: 3      5/31/2022 -  Cancer Staged    Staging form: Melanoma of the Skin, AJCC 8th Edition  - Clinical stage from 5/31/2022: Stage IIB (cT4a, cN0, cM0) - Signed by Srinivas Patel MD on 8/25/2022 7/1/2022 Initial Diagnosis    Malignant melanoma of leg, right ([address] )     7/29/2022 Surgery    A  Lymph node, sentinel, #1:  One lymph node with rare metastatic melanoma cells (1/1), subcapsular location  Immunohistochemical study for MART-1, HMB45 and S100 supports the findings  B  Leg, right, knee, wide excision:  Residual melanoma, nodular type, and scar; margins free   See synoptic report  7/29/2022 -  Cancer Staged    Staging form: Melanoma of the Skin, AJCC 8th Edition  - Pathologic stage from 7/29/2022: Stage IIIC (pT4a, pN1a, cM0) - Signed by Kala Falcon MD on 8/25/2022          Cancer Staging  Stage IIIC ([id number])      Treatment Details   Treatment goal [No plan goal]   Plan Name ONE-TIME BLOOD PRODUCT TRANSFUSION PLAN   Status Active   Start Date 7/15/2022   End Date Until discontinued   Provider VISHNU Cain   Chemotherapy [No matching medication found in this treatment plan]     Treatment Details   Treatment goal Palliative   Plan Name OP RiTUXimab weekly x 4, every 6 months   Status Active   Start Date 8/1/2022   End Date 8/23/2022   Provider Cruz Gooden,    Chemotherapy riTUXimab (RITUXAN) subsequent titrated chemo infusion, 375 mg/m2 = 746 2 mg, Intravenous, Once, 1 of 1 cycle  Administration: 746 2 mg (8/9/2022), 746 2 mg (8/16/2022), 746 2 mg (8/23/2022)    riTUXimab (RITUXAN) first titrated chemo infusion, 375 mg/m2 = 746 2 mg, Intravenous, Once, 1 of 1 cycle  Administration: 746 2 mg (8/1/2022)          HISTORY OF PRESENT ILLNESS: Jeanine Coello is a 75 y o  male  who have hemolytic anemia with ongoing treatment with Rituxan here for discussion of new diagnosis of melanoma  Mr Karla Morillo has a longstanding history of hemolytic anemia treated with rituximab, he described an agent that was chemotherapy, high-dose steroids over the past 6-7 years currently on prednisone 40 mg daily and now receiving weekly Rituxan with anticipation of his 4th dose this week  He states that at approximately year ago he noticed a small blood blister on his right knee and was underneath the skin  He states that over the past 6 months it has grown significantly and had become elevated and above the skin  He states he grew very fast and then it was angry looking and describes it is being size the dawson and crusting    He says it would itch, bleed, and was painful  He saw Dr Queen Newsome and underwent a biopsy on 05/31/2022  Pathology demonstrated invasive melanoma at least 7 0 mm Breslow thickness, no ulceration, at knees Allen level 4, nodular melanoma, 3 mitoses per mm2, and present TILs that were non brisk  Saw Dr Lauryn Alvarado and underwent wide local excision and sentinel lymph node biopsy on 07/29/2022  Pathology demonstrated residual melanoma, nodular type a with scar and negative margins  Pathology demonstrated tumor thickness of 8 mm, no ulceration, for mitoses per mm squared and Allen level 5  [place] lymph node demonstrated rare metastatic melanoma cells in the subcapsular location  He has recovered from surgery and doing well  He states that the area where the sentinel lymph node biopsy was removed continues to grow in size with a single nodule/mass that is enlarging  He feels that is pushing on his leg and causing his increasing right lower extremity swelling  It is painful and uncomfortable  He was hospitalized during his surgical procedure as any procedure exacerbations his hemolytic anemia  His current following with Dr Kirill Matthews  He describes having normal sun exposure when he was younger but most recently increased sun exposure as he has been over in the [facility] for 17 years  He does describe having increased number of sunburns  He denies any blistering sunburns  He has used to tanning bed but describes less than 10 times  He thinks his dad may have had melanoma he is a little unclear  His mother was diagnosed with breast cancer in her late 62s  He denies a family history of ovarian or pancreatic cancer  He has 2 children aged 39 and 27  He has brown hair when he was younger, has brown a, and his father's and [place] in descent  He is up-to-date on colonoscopy and PSA  He occasionally smokes a cigar  He has not smoked cigarettes  Uses alcohol occasionally    And does use medical marijuana approximately 2 times per week  He is scheduled for a PET scan later this week and a brain MRI next month  REVIEW OF SYSTEMS:  Review of Systems   Constitutional: Negative for appetite change, fatigue, fever and unexpected weight change  HENT:   Negative for lump/mass  Eyes: Negative for icterus  Respiratory: Negative for cough, shortness of breath and wheezing  Cardiovascular: Positive for leg swelling (R >>>L)  Gastrointestinal: Negative for abdominal pain, constipation, diarrhea, nausea and vomiting  Genitourinary: Negative for difficulty urinating and hematuria  Musculoskeletal: Negative for arthralgias, gait problem and myalgias  Skin: Negative for itching and rash  No new, changing, or concerning lesions  Neurological: Negative for extremity weakness, gait problem, headaches, light-headedness and numbness  Hematological: Negative for adenopathy          MEDICATIONS:    Current Outpatient Medications:     acetaminophen (TYLENOL) 325 mg tablet, Take 2 tablets (650 mg total) by mouth every 6 (six) hours as needed for mild pain, Disp:  , Rfl: 0    benzonatate (TESSALON PERLES) 100 mg capsule, Take 1 capsule (100 mg total) by mouth 3 (three) times a day, Disp: 20 capsule, Rfl: 0    dabigatran etexilate (PRADAXA) 150 mg capsu, Take 1 capsule (150 mg total) by mouth every 12 (twelve) hours, Disp: 60 capsule, Rfl: 0    hydrochlorothiazide (HYDRODIURIL) 25 mg tablet, Take 1 tablet (25 mg total) by mouth daily (Patient taking differently: Take 25 mg by mouth every morning), Disp: 30 tablet, Rfl: 5    metoprolol succinate (TOPROL-XL) 25 mg 24 hr tablet, Take 0 5 tablets (12 5 mg total) by mouth daily (Patient taking differently: Take 12 5 mg by mouth every morning), Disp: 30 tablet, Rfl: 5    pantoprazole (PROTONIX) 40 mg tablet, Take 1 tablet (40 mg total) by mouth daily (Patient taking differently: Take 40 mg by mouth every morning), Disp: 90 tablet, Rfl: 3    potassium chloride (K-DUR,KLOR-CON) 20 mEq tablet, Take 1 tablet (20 mEq total) by mouth daily (Patient taking differently: Take 20 mEq by mouth every morning), Disp: 30 tablet, Rfl: 3    prazosin (MINIPRESS) 1 mg capsule, Take 1 mg by mouth daily at bedtime , Disp: , Rfl:     predniSONE 20 mg tablet, Take 2 tablets (40 mg total) by mouth daily, Disp: 60 tablet, Rfl: 0    sulfamethoxazole-trimethoprim (BACTRIM DS) 800-160 mg per tablet, Take 1 tablet by mouth 3 (three) times a week Monday, Wednesday and Friday, Disp: 12 tablet, Rfl: 0    traMADol (Ultram) 50 mg tablet, Take 1 tablet (50 mg total) by mouth every 6 (six) hours as needed for moderate pain, Disp: 10 tablet, Rfl: 0    VITAMIN D PO, Take 1,000 Units by mouth daily , Disp: , Rfl:     ascorbic acid (VITAMIN C) 1000 MG tablet, Take 1 tablet (1,000 mg total) by mouth every 12 (twelve) hours for 6 doses (Patient taking differently: Take 1,000 mg by mouth daily Every other day), Disp: 6 tablet, Rfl: 0    furosemide (LASIX) 20 mg tablet, Take 1 tablet (20 mg total) by mouth daily, Disp: 5 tablet, Rfl: 0  No current facility-administered medications for this visit      Facility-Administered Medications Ordered in Other Visits:     acetaminophen (TYLENOL) tablet 650 mg, 650 mg, Oral, Once, [place] Octave, DO     ALLERGIES:  No Known Allergies     ACTIVE PROBLEMS:  Patient Active Problem List   Diagnosis    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    Hyperbilirubinemia    Symptomatic anemia    Pulmonary embolism with acute cor pulmonale (HCC)    Portal vein thrombosis    Elevated blood pressure reading without diagnosis of hypertension    Shortness of breath    Gastroesophageal reflux disease    Autoimmune hemolytic anemia    [facility]LLA [facility]    Splenomegaly    Iron overload due to repeated red blood cell transfusions    Posttraumatic stress disorder    Essential hypertension    Glucose intolerance (impaired glucose tolerance)    Renal insufficiency    Auto immune neutropenia (HCC)    Primary osteoarthritis of left knee    Pain in joint, lower leg    Pain in left knee    COVID-19    Thrombocytosis    Primary localized osteoarthrosis of the knee, right    Hyperglycemia    Chronic bilateral low back pain with bilateral sciatica    Hypercholesterolemia    Malignant melanoma of leg, right (HCC)    Dyspnea    Acute respiratory failure with hypoxia (HCC)    Elevated serum creatinine    Elevated bilirubin    Leukocytosis          PAST MEDICAL HISTORY:   Past Medical History:   Diagnosis Date    Anemia 11/2/2016    Anxiety     Arthritis     Autoimmune hemolytic anemia ([address] )     Claustrophobia     COVID 12/2020    DVT (deep venous thrombosis) (HCC)     GERD (gastroesophageal reflux disease)     Hearing loss, right     Hemolytic anemia ([address] )     History of transfusion     2018 - no adverse reaction    Hypertension     Malignant melanoma of leg, right ([address] ) 7/1/2022    Palpitation     Portal vein thrombosis     PTSD (post-traumatic stress disorder)     Pulmonary emboli (HCC)     Tobacco abuse         PAST SURGICAL HISTORY:  Past Surgical History:   Procedure Laterality Date    CATARACT EXTRACTION Bilateral     CHOLECYSTECTOMY  7/18/2017    COLONOSCOPY      ELBOW ARTHROPLASTY Left     bursectomy    JOINT REPLACEMENT Right 2/2/2021    knee    KNEE SURGERY Right     meniscus tear    LYMPH NODE BIOPSY Right 7/29/2022    Procedure: BIOPSY LYMPH NODE SENTINEL;  Surgeon: Pushpa Islas MD;  [facility];  Service: Surgical Oncology    IA LAP,CHOLECYSTECTOMY/GRAPH N/A 12/23/2017    Procedure: CHOLECYSTECTOMY LAPAROSCOPIC with cholangiogram;  Surgeon: Yessenia Barry MD;  [facility];  Service: General    IA REMOVAL SPLEEN, TOTAL N/A 5/18/2017    Procedure: LAPAROSCOPIC HAND ASSIST SPLENECTOMY;  Surgeon: Nikos Dior MD;  [facility];  Service: Surgical Oncology    IA TOTAL KNEE ARTHROPLASTY Right 2/2/2021    Procedure: ARTHROPLASTY KNEE TOTAL;  Surgeon: Anita Cole MD;  [facility];  Service: Orthopedics    OK TOTAL KNEE ARTHROPLASTY Left 11/17/2021    Procedure: TOTAL KNEE REPLACEMENT;  Surgeon: Anita Cole MD;  [facility];  Service: Orthopedics    SHOULDER SURGERY Left     rotator cuff x4, reconstruction    SKIN LESION EXCISION Right 7/29/2022    Procedure: WIDE EXCISION RIGHT MEDIAL THIGH;  Surgeon: Pushpa Islas MD;  [facility];  Service: Surgical Oncology        SOCIAL HISTORY:  Social History     Socioeconomic History    Marital status: /Civil Union     Spouse name: None    Number of children: None    Years of education: None    Highest education level: None   Occupational History    None   Tobacco Use    Smoking status: Current Some Day Smoker     Packs/day: 0 00     Years: 5 00     Pack years: 0 00     Types: Cigars    Smokeless tobacco: Current User     Types: Snuff    Tobacco comment: 5  a week average   Vaping Use    Vaping Use: Never used   Substance and Sexual Activity    Alcohol use: Yes     Alcohol/week: 6 0 standard drinks     Types: 6 Cans of beer per week     Comment: socially    Drug use: Yes     Frequency: 3 0 times per week     Types: Marijuana     Comment: medical marijuana    Sexual activity: Yes     Partners: Female     Birth control/protection: None   Other Topics Concern    None   Social History Narrative    Daily coffee consumption (2 cups/day)    reitred     Social Determinants of Health     Financial Resource Strain: Not on file   Food Insecurity: No Food Insecurity    Worried About Running Out of Food in the Last Year: Never true    Michelle of Food in the Last Year: Never true   Transportation Needs: No Transportation Needs    Lack of Transportation (Medical): No    Lack of Transportation (Non-Medical):  No   Physical Activity: Not on file   Stress: Not on file   Social Connections: Not on file Intimate Partner Violence: Not on file   Housing Stability: Low Risk     Unable to Pay for Housing in the Last Year: No    Number of Places Lived in the Last Year: 1    Unstable Housing in the Last Year: No        FAMILY HISTORY:  Family History   Problem Relation Age of Onset   Trever [place] Cancer Mother     Breast cancer Mother     Other Father         CABG    Heart attack Paternal Grandfather         acute MI           Objective    PHYSICAL EXAMINATION:   Blood pressure 148/78, pulse 80, temperature 98 °F (36 7 °C), resp  rate 18, height 5' 7 01" (1 702 m), weight 85 7 kg (189 lb), SpO2 98 %  Pain Score: 0-No pain     ECOG Performance Status    Flowsheet Row Most Recent Value   ECOG Performance Status 0 - Fully active, able to carry on all pre-disease performance without restriction           Physical Exam  Constitutional:       General: He is not in acute distress  Appearance: Normal appearance  He is not toxic-appearing  HENT:      Mouth/Throat:      Mouth: Mucous membranes are moist       Pharynx: Oropharynx is clear  Eyes:      General: Scleral icterus (trace) present  Cardiovascular:      Rate and Rhythm: Normal rate and regular rhythm  Pulses: Normal pulses  Heart sounds: No murmur heard  No friction rub  No gallop  Pulmonary:      Effort: Pulmonary effort is normal  No respiratory distress  Breath sounds: Normal breath sounds  No wheezing or rales  Chest:   Breasts:      Right: No axillary adenopathy or supraclavicular adenopathy  Left: No axillary adenopathy or supraclavicular adenopathy  Abdominal:      General: There is no distension  Palpations: There is no mass  Tenderness: There is no abdominal tenderness  There is no rebound  Musculoskeletal:         General: No swelling or tenderness  Right lower leg: Edema (pitting edema up to knee ) present  Left lower leg: Edema (mild pitting edema at ankles) present     Lymphadenopathy:      Head: Right side of head: No submandibular, preauricular or posterior auricular adenopathy  Left side of head: No submandibular, preauricular or posterior auricular adenopathy  Cervical: No cervical adenopathy  Right cervical: No superficial or posterior cervical adenopathy  Left cervical: No superficial or posterior cervical adenopathy  Upper Body:      Right upper body: No supraclavicular or axillary adenopathy  Left upper body: No supraclavicular or axillary adenopathy  Lower Body: No right inguinal adenopathy  No left inguinal adenopathy  Skin:     Findings: No rash  Comments: Well healed surgical scar  3 cm nodule at site of scar/SLN excision  Painful/uncomfortable  Neurological:      General: No focal deficit present  Mental Status: He is alert and oriented to person, place, and time  Psychiatric:         Mood and Affect: Mood normal          Behavior: Behavior normal          Thought Content: Thought content normal          Judgment: Judgment normal          I reviewed lab data in the chart      WBC   Date Value Ref Range Status   08/23/2022 14 88 (H) 4 31 - 10 16 Thousand/uL Final   08/15/2022 13 39 (H) 4 31 - 10 16 Thousand/uL Final   08/08/2022 11 93 (H) 4 31 - 10 16 Thousand/uL Final     Hemoglobin   Date Value Ref Range Status   08/23/2022 10 2 (L) 12 0 - 17 0 g/dL Final   08/15/2022 9 1 (L) 12 0 - 17 0 g/dL Final   08/08/2022 8 5 (L) 12 0 - 17 0 g/dL Final     Platelets   Date Value Ref Range Status   08/23/2022 494 (H) 149 - 390 Thousands/uL Final   08/15/2022 867 (H) 149 - 390 Thousands/uL Final   08/08/2022 608 (H) 149 - 390 Thousands/uL Final     MCV   Date Value Ref Range Status   08/23/2022 131 (H) 82 - 98 fL Final   08/15/2022 128 (H) 82 - 98 fL Final   08/08/2022 131 (H) 82 - 98 fL Final      Potassium   Date Value Ref Range Status   08/04/2022 3 1 (L) 3 5 - 5 3 mmol/L Final   08/04/2022 3 1 (L) 3 5 - 5 3 mmol/L Final   08/03/2022 3 2 (L) 3 5 - 5 3 mmol/L Final     Chloride   Date Value Ref Range Status   08/04/2022 106 96 - 108 mmol/L Final   08/04/2022 106 96 - 108 mmol/L Final   08/03/2022 106 96 - 108 mmol/L Final     CO2   Date Value Ref Range Status   08/04/2022 28 21 - 32 mmol/L Final   08/04/2022 27 21 - 32 mmol/L Final   08/03/2022 27 21 - 32 mmol/L Final     CO2, i-STAT   Date Value Ref Range Status   05/18/2017 26 21 - 32 mmol/L Final     BUN   Date Value Ref Range Status   08/04/2022 34 (H) 5 - 25 mg/dL Final   08/04/2022 34 (H) 5 - 25 mg/dL Final   08/03/2022 43 (H) 5 - 25 mg/dL Final     Creatinine   Date Value Ref Range Status   08/04/2022 1 29 0 60 - 1 30 mg/dL Final     Comment:     Standardized to IDMS reference method   08/04/2022 1 25 0 60 - 1 30 mg/dL Final     Comment:     Standardized to IDMS reference method   08/03/2022 1 28 0 60 - 1 30 mg/dL Final     Comment:     Standardized to IDMS reference method     Glucose   Date Value Ref Range Status   08/04/2022 116 65 - 140 mg/dL Final     Comment:     If the patient is fasting, the ADA then defines impaired fasting glucose as > 100 mg/dL and diabetes as > or equal to 123 mg/dL  Specimen collection should occur prior to Sulfasalazine administration due to the potential for falsely depressed results  Specimen collection should occur prior to Sulfapyridine administration due to the potential for falsely elevated results  08/04/2022 117 65 - 140 mg/dL Final     Comment:     If the patient is fasting, the ADA then defines impaired fasting glucose as > 100 mg/dL and diabetes as > or equal to 123 mg/dL  Specimen collection should occur prior to Sulfasalazine administration due to the potential for falsely depressed results  Specimen collection should occur prior to Sulfapyridine administration due to the potential for falsely elevated results     08/03/2022 103 65 - 140 mg/dL Final     Comment:     If the patient is fasting, the ADA then defines impaired fasting glucose as > 100 mg/dL and diabetes as > or equal to 123 mg/dL  Specimen collection should occur prior to Sulfasalazine administration due to the potential for falsely depressed results  Specimen collection should occur prior to Sulfapyridine administration due to the potential for falsely elevated results  Calcium   Date Value Ref Range Status   08/04/2022 9 3 8 3 - 10 1 mg/dL Final   08/04/2022 9 2 8 3 - 10 1 mg/dL Final   08/03/2022 9 1 8 3 - 10 1 mg/dL Final     Albumin   Date Value Ref Range Status   08/04/2022 3 5 3 5 - 5 0 g/dL Final   08/02/2022 3 2 (L) 3 5 - 5 0 g/dL Final   08/01/2022 3 1 (L) 3 5 - 5 0 g/dL Final     Total Bilirubin   Date Value Ref Range Status   08/04/2022 7 70 (H) 0 20 - 1 00 mg/dL Final     Comment:     Use of this assay is not recommended for patients undergoing treatment with eltrombopag due to the potential for falsely elevated results  08/02/2022 8 53 (H) 0 20 - 1 00 mg/dL Final     Comment:     Use of this assay is not recommended for patients undergoing treatment with eltrombopag due to the potential for falsely elevated results  08/01/2022 8 08 (H) 0 20 - 1 00 mg/dL Final     Comment:     Use of this assay is not recommended for patients undergoing treatment with eltrombopag due to the potential for falsely elevated results  Alkaline Phosphatase   Date Value Ref Range Status   08/04/2022 96 46 - 116 U/L Final   08/02/2022 91 46 - 116 U/L Final   08/01/2022 78 46 - 116 U/L Final     AST   Date Value Ref Range Status   08/04/2022 65 (H) 5 - 45 U/L Final     Comment:     Specimen collection should occur prior to Sulfasalazine administration due to the potential for falsely depressed results  08/02/2022 115 (H) 5 - 45 U/L Final     Comment:     Specimen collection should occur prior to Sulfasalazine administration due to the potential for falsely depressed results      08/01/2022 86 (H) 5 - 45 U/L Final     Comment:     Specimen collection should occur prior to Sulfasalazine administration due to the potential for falsely depressed results  ALT   Date Value Ref Range Status   08/04/2022 51 12 - 78 U/L Final     Comment:     Specimen collection should occur prior to Sulfasalazine and/or Sulfapyridine administration due to the potential for falsely depressed results  08/02/2022 63 12 - 78 U/L Final     Comment:     Specimen collection should occur prior to Sulfasalazine and/or Sulfapyridine administration due to the potential for falsely depressed results  08/01/2022 47 12 - 78 U/L Final     Comment:     Specimen collection should occur prior to Sulfasalazine and/or Sulfapyridine administration due to the potential for falsely depressed results  LD   Date Value Ref Range Status   07/30/2022 1,077 (H) 81 - 234 U/L Final   12/22/2020 754 (H) 81 - 234 U/L Final   12/20/2020 1,083 (H) 81 - 234 U/L Final     Comment:     15     LDH   Date Value Ref Range Status   03/11/2021 368 (H) 121 - 224 IU/L Final     No results found for: TSH  No results found for: S2FARYZ   No results found for: FREET4      RECENT IMAGING:  Procedure: XR chest portable    Result Date: 7/31/2022  Narrative: CHEST INDICATION:   Hypoxemia  COMPARISON:  7/29/2022 EXAM PERFORMED/VIEWS:  XR CHEST PORTABLE FINDINGS: Cardiomediastinal silhouette appears unremarkable  The lungs are clear  No pneumothorax or pleural effusion  Osseous structures appear within normal limits for patient age  Impression: No acute cardiopulmonary disease  Workstation performed: [id number]     Procedure: XR chest portable    Result Date: 7/29/2022  Narrative: CHEST INDICATION:   Shortness of breath  COMPARISON:  12/18/2020  EXAM PERFORMED/VIEWS:  XR CHEST PORTABLE FINDINGS:  Monitoring leads and clips project over the chest  Cardiomediastinal silhouette appears stable  The lungs are clear  No pneumothorax or pleural effusion  Degenerative changes  Left upper quadrant embolization coils  Impression: No acute cardiopulmonary disease   Workstation performed: [id number]     Procedure: NM lymphatic melanoma    Result Date: 7/29/2022  Narrative: SENTINEL NODE LYMPHOSCINTIGRAPHY INDICATION:   Lower extremity melanoma  FINDINGS: 0 55 mCi Tc-99m sulfur colloid (0 6 cc volume) was administered intradermally in divided doses by Dr Baldemar Stone in the region of the patients right lower extremity melanoma  Scintigraphic images were obtained over the right groin and right lower extremity in multiple projections  Single node is identified within the right groin Using scintigraphic guidance, the corresponding skin site was marked with an indelible marker  The patient was transferred to the operating room in satisfactory condition  Impression: 1  [place] lymph node localized to right inguinal region  Workstation performed: [id number]     Procedure: CTA chest pe study    Result Date: 7/31/2022  Narrative: CTA - CHEST WITH IV CONTRAST - PULMONARY ANGIOGRAM INDICATION:   tachypnea, tachycardia, R/O PE  COMPARISON: 4/30/2022  TECHNIQUE: CTA examination of the chest was performed using angiographic technique according to a protocol specifically tailored to evaluate for pulmonary embolism  Axial, sagittal, and coronal 2D reformatted images were created from the source data and  submitted for interpretation  In addition, coronal 3D MIP postprocessing was performed on the acquisition scanner  Radiation dose length product (DLP) for this visit:  572 mGy-cm   This examination, like all CT scans performed in the [facility], was performed utilizing techniques to minimize radiation dose exposure, including the use of iterative reconstruction and automated exposure control  IV Contrast:  70 mL of iohexol (OMNIPAQUE)  FINDINGS: PULMONARY ARTERIAL TREE:  Scattered segmental/subsegmental pulmonary emboli are seen throughout the lung    LUNGS:  Scattered groundglass opacities suggests a small vessel or small airways process process    There is no tracheal or endobronchial lesion  PLEURA:  Unremarkable  HEART/GREAT VESSELS:  RV LV ratio is greater than 0 9 concerning for right ventricular strain    No thoracic aortic aneurysm  MEDIASTINUM AND BRENDA:  Unremarkable  CHEST WALL AND LOWER NECK:   Unremarkable  VISUALIZED STRUCTURES IN THE UPPER ABDOMEN:  Unremarkable  OSSEOUS STRUCTURES:  No acute fracture or destructive osseous lesion  Impression: Right middle lobe pulmonary embolism are noted    RV LV ratio is greater than 0 9 concerning for right ventricular strain     I personally discussed this study with Vidal Harrell on 7/31/2022 at 2:17 AM  Workstation performed: [id number]     Procedure: VAS lower limb venous duplex study, complete bilateral    Result Date: 8/1/2022  Narrative:  [facility] REPORT CLINICAL: Indications: PT C/O PE  He is currently receiving IV heparin  Physician wants to rule out B/L LE DVT  Operative History: No prior heart or vascular surgery Risk Factors The patient has history of HTN, current smoking, GERD, autoimmune hemolytic anemia, malignant melanoma right leg, COVID 19 virus (12/20), PE, portal vein thrombus, PTSD, and DVT  Height:  67 inches  Weight:  192 lbs  CONCLUSION:  Impression:  RIGHT LOWER LIMB: Based on color flow imaging, evaluation shows no evidence of acute deep vein thrombosis  The common femoral artery and sapheno-femoral junction were not evaluated secondary to lymph node removal at groin/pain  Tech note: A non-vascular structure measuring 3 47 cm was identified at the groin at the lymph node removal site  No evidence of superficial thrombophlebitis noted  Doppler evaluation shows a normal response to augmentation maneuvers  Popliteal, posterior tibial, and anterior tibial arterial Doppler waveforms are triphasic  LEFT LOWER LIMB: No evidence of acute or chronic deep vein thrombosis  No evidence of superficial thrombophlebitis noted  Doppler evaluation shows a normal response to augmentation maneuvers  Popliteal, posterior tibial, and anterior tibial arterial Doppler waveforms are triphasic  Technical findings were posted on EPIC  SIGNATURE: Electronically Signed by: Alem Cortez on 2022-08-01 09:28:42 AM    Procedure: US bedside procedure    Result Date: 7/31/2022  Narrative: 1 2 [id number]    Procedure: Echo complete w/ contrast if indicated    Result Date: 7/31/2022  Narrative: Alma Elizabeth  Left Ventricle: Left ventricular cavity size is normal  Wall thickness is mildly increased  The left ventricular ejection fraction is 70%  Systolic function is hyperdynamic  Wall motion is normal    Right Ventricle: Right ventricular cavity size is dilated  Systolic function is mildly reduced    Tricuspid Valve: There is moderate to severe regurgitation  The right ventricular systolic pressure is severely elevated  The estimated right ventricular systolic pressure is 80 mmHg  Assessment/Plan  Mr Serjio Christopher is a 75 yr male with Stage IIIC melanoma here for disease management discussion  1  Malignant melanoma of right lower extremity including hip ([address] )  2  Malignant melanoma of leg, right ([address] )    I had an extensive discussion with the patient regarding his  diagnosis, prognosis, and recommendations for further management  We reviewed his melanoma history, current findings, pathology and imaging tests  We discussed that he has a thick melanoma and is at least stage IIIC  He does have imaging scheduled for this week and in the beginning of September to evaluate final staging  For we discussed that he has a positive sentinel lymph node in the past we automatically recommended a completion lymph node dissection  We discussed whether or not a CLND is needed  We discussed the principle and reasoning behind CLND, including accurate staging and local disease control    We also discussed the newer surgical data that state there is no overall survival difference between immediate CLND vs no CLND now and monitoring with US and CLND if there is a recurrence at the time  Therefore, I would consider no CLND and close monitoring with US of the LN basin  If he does not have metastatic disease  We discussed the principle and reasoning behind adjuvant treatment  We discussed the data in Stage III melanoma and the overall reduced RFS and improved PFS in Stage III melanomas treated with nivolumab in the adjuvant setting  We also discussed the use of BRAF/MEK inhibitors in the adjuvant setting and the overall reduced RFS and improved PFS in Stage III melanoma treated with dabrafenib and trametinib  We discussed the need to have a BRAF V600 mutation in order to be eligible for this treatment  We will send his tumor for molecular testing  We discussed that in the setting of hemolytic anemia on treatment with rituximab and prednisone, I would not necessarily recommend treatment with immunotherapy at this time  We can consider use of immunotherapy in the future if needed  As such I discussed treatment with BRAF/MEK inhibitors in the adjuvant setting to reduce the risk of recurrence  he has a BRAF V600K mutation and would be eligible for targeted therapy with BRAF/MEK inhibitor  We did discuss the concern for development of treatment resistance  We discussed the side effects of the combination of the inhibitors which includes but is not limited to rash, migratory arthralgias and myalgias, GI distress, LFT abnormalities, potential for development of SCC, and cardiac toxicity  We discussed that there are a few companies that make these drug combination and that unique side effects included extreme photosensitivity, pyrexia/fevers, and ocular toxicity for each of the different combinations  I discussed that final recommendations will be made on final staging, which will require his imaging  He is due soon    He is unsure if he would like to undergo adjuvant treatment, which is understandable  I did provide him with information regarding BRAF and MEK inhibitors  He will think about undergoing treatment  He he will return in approximately 1 month at that time we should have B Alfredo results, and results from staging PET scan and brain MRI  We discussed that he will require ongoing monitoring and surveillance, both for disease recurrence as well as a new primary melanoma as well as other nonmelanoma skin cancers  This includes physical exams and labs, including a comprehensive metabolic panel, CBC with differential and LDH; periodic imaging studies with either CT chest, abdomen and pelvis or PET/CT scans every 6 months  We discussed the importance of regular cutaneous self examinations and reviewed the features of lesions that could be concerning for skin cancer  I did explain that he  is at risk for developing another primary melanoma as well  We also discussed avoidance of unnecessary sun exposure and use of sun protective clothing and sunscreen  I also recommended routine follow up and skin checks with dermatology  Family Screening: his  first-degree relatives, namely his siblings, parents, and children, have an increased risk of developing a melanoma over the general population given  his  diagnosis of melanoma, and should have annual dermatologic screening  I did discuss that he might benefit from treatment with Lasix to help with lower extremity edema  He has bilateral swelling, although right swelling is much greater than the left  Possible that he has some excessive fluid overload and might benefit from treatment with furosemide for a few days  I asked that his P to his primary care doctor regarding this as he would need potassium monitored as well  Will continue to monitor his legs and swelling and obtain further imaging if needed  He will return for follow-up after completing imaging    He knows to call with any issues or concerns prior to his next visit       - CBC and differential; Future  - Comprehensive metabolic panel; Future  - LD,Blood; Future        3  Autoimmune hemolytic anemia (HCC)  Currently on prednisone 40 mg daily and ongoing infusions with rituximab at this time  Continue follow-up we she  This will limit potential treatment options for his melanoma however we will deal with treatment options and side effects based on disease stage  Return in about 4 weeks (around 9/19/2022) for Office Visit, labs       Brena Osler, MD, PhD

## 2022-08-23 ENCOUNTER — HOSPITAL ENCOUNTER (OUTPATIENT)
Dept: INFUSION CENTER | Facility: CLINIC | Age: 67
Discharge: HOME | End: 2022-08-23
Payer: MEDICARE

## 2022-08-23 VITALS
TEMPERATURE: 98.1 F | WEIGHT: 188.93 LBS | SYSTOLIC BLOOD PRESSURE: 166 MMHG | HEART RATE: 79 BPM | HEIGHT: 67 IN | DIASTOLIC BLOOD PRESSURE: 89 MMHG | RESPIRATION RATE: 18 BRPM | BODY MASS INDEX: 29.65 KG/M2

## 2022-08-23 DIAGNOSIS — D59.10 AUTOIMMUNE HEMOLYTIC ANEMIA (HCC): Primary | ICD-10-CM

## 2022-08-23 PROCEDURE — 96367 TX/PROPH/DG ADDL SEQ IV INF: CPT

## 2022-08-23 PROCEDURE — 96413 CHEMO IV INFUSION 1 HR: CPT

## 2022-08-23 PROCEDURE — 96415 CHEMO IV INFUSION ADDL HR: CPT

## 2022-08-23 RX ORDER — ACETAMINOPHEN 325 MG/1
650 TABLET ORAL ONCE
Status: DISCONTINUED | OUTPATIENT
Start: 2022-08-23 | End: 2022-08-26 | Stop reason: HOSPADM

## 2022-08-23 RX ORDER — SODIUM CHLORIDE 9 MG/ML
20 INJECTION, SOLUTION INTRAVENOUS ONCE
Status: COMPLETED | OUTPATIENT
Start: 2022-08-23 | End: 2022-08-23

## 2022-08-23 RX ADMIN — RITUXIMAB 746.2 MG: 10 INJECTION, SOLUTION INTRAVENOUS at 11:47

## 2022-08-23 RX ADMIN — DIPHENHYDRAMINE HYDROCHLORIDE 25 MG: 50 INJECTION, SOLUTION INTRAMUSCULAR; INTRAVENOUS at 11:21

## 2022-08-23 RX ADMIN — SODIUM CHLORIDE 20 ML/HR: 0.9 INJECTION, SOLUTION INTRAVENOUS at 11:17

## 2022-08-23 NOTE — PROGRESS NOTES
Pt arrived to unit without complaint, tolerated Rituxan infusion without incident  Pt declines AVS, he has no further infusions scheduled at this time  Pt left unit in stable condition

## 2022-08-24 ENCOUNTER — OFFICE VISIT (OUTPATIENT)
Dept: HEMATOLOGY ONCOLOGY | Facility: CLINIC | Age: 67
End: 2022-08-24
Payer: MEDICARE

## 2022-08-24 VITALS
HEIGHT: 67 IN | BODY MASS INDEX: 29.9 KG/M2 | OXYGEN SATURATION: 96 % | WEIGHT: 190.5 LBS | HEART RATE: 86 BPM | TEMPERATURE: 97.4 F | DIASTOLIC BLOOD PRESSURE: 78 MMHG | RESPIRATION RATE: 17 BRPM | SYSTOLIC BLOOD PRESSURE: 140 MMHG

## 2022-08-24 DIAGNOSIS — D59.10 AUTOIMMUNE HEMOLYTIC ANEMIA (HCC): Primary | ICD-10-CM

## 2022-08-24 DIAGNOSIS — R60.0 LOWER EXTREMITY EDEMA: ICD-10-CM

## 2022-08-24 PROCEDURE — 99214 OFFICE O/P EST MOD 30 MIN: CPT | Performed by: NURSE PRACTITIONER

## 2022-08-24 RX ORDER — FUROSEMIDE 20 MG/1
20 TABLET ORAL DAILY
Qty: 5 TABLET | Refills: 0 | Status: SHIPPED | OUTPATIENT
Start: 2022-08-24

## 2022-08-24 NOTE — PROGRESS NOTES
[address] [facility] [address] [address]  [phone]  Oncology Progress Note  Darren De Dios, 1955, [id number]  8/24/2022        Assessment/Plan:   1  Autoimmune hemolytic anemia (HCC)  2  Lower extremity edema   Patient is a 58-year-old male with a history of autoimmune hemolytic anemia  He has been managed with prednisone maintaining hemoglobin between 10 and 11 however in June of 2022 patient became more anemic with a hemoglobin dropping below 10  We had increased his steroid dosage and monitored on a weekly basis  On 07/29/2022 patient's hemoglobin dropped to 6 4 however patient had a wide excision of the right medial thigh and sentinel lymph node biopsy on 07/29/2022 for malignant melanoma  He was hospitalized for management of AIHA with both warm and cold autoantibodies  Patient was then started on tapering prednisone  He received several units of packed red blood cells during hospitalization  On 07/31/2022 patient developed a pulmonary embolism  He had been off Pradaxa for 2 days prior to the wide excision  It was felt this was not a failure of anticoagulation but likely secondary to surgical procedure  Patient was initiated on a heparin drip then transitioned back to Pradaxa prior to discharge  He currently remains on Pradaxa 150 mg p o  b i d  Currently patient is on prednisone 40 mg p o  daily  We will decrease this to 30 mg p o  once daily and continue monitoring his CBC on a weekly basis  Patient has an appointment scheduled for October however we will move this up to see him in month  We will adjust his prednisone accordingly  Patient is wife verbalized understanding and are in agreement with the plan  Addendum   He is currently following with Dr Jessica Leon for malignant melanoma  He states he saw her yesterday however I do not see her note readily available    Dr Miki Sawyer recommended patient to hold HCTZ 5-7 days then start on Lasix for right lower extremity edema  Patient was unclear as to the dosage and length of time to remain on Lasix  I reached out to Dr Mike Phelan for clarification  We will initiate Lasix 20 mg p o  once daily 3-5 days  We will monitor his CMP for hypokalemia  I called patient and left message with these instructions  Patient also has a follow-up appointment with his PCP in a few days and will further discuss management with him  - furosemide (LASIX) 20 mg tablet; Take 1 tablet (20 mg total) by mouth daily  Dispense: 5 tablet; Refill: 0  - Comprehensive metabolic panel; Standing  - CBC and differential; Standing  - Comprehensive metabolic panel  - CBC and differential    Goals and Barriers:    Current Goal:   Prolong Survival from Cancer  Barriers: None  Patient's Capacity to Self Care:  Patient is able to self care  [address]   ______________________________________________________________________________________________________________    Subjective     History of present illness/Cancer History:   Oncology History   Malignant melanoma of leg, right ([facility] )   5/31/2022 Biopsy    Right Leg, Shave Biopsy   Melanoma extending to the deep specimen margin  Thickness: 7 0mm  Ulceration: not seen   Mitoses: 3      7/1/2022 Initial Diagnosis    Malignant [facility], [facility] )     7/29/2022 Surgery    A  Lymph node, sentinel, #1:  One lymph node with rare metastatic melanoma cells (1/1), subcapsular location  Immunohistochemical study for MART-1, HMB45 and S100 supports the findings  B  Leg, right, knee, wide excision:  Residual melanoma, nodular type, and scar; margins free  See synoptic report            Lab Results   Component Value Date    WBC 14 88 (H) 08/23/2022    HGB 10 2 (L) 08/23/2022    HCT 31 2 (L) 08/23/2022     (H) 08/23/2022     (H) 08/23/2022     Lab Results   Component Value Date    SODIUM 140 2022    SODIUM 139 2022    K 3 1 (L) 2022    K 3 1 (L) 2022     2022     2022    CO2 28 2022    CO2 27 2022    AGAP 6 2022    AGAP 6 2022    BUN 34 (H) 2022    BUN 34 (H) 2022    CREATININE 1 29 2022    CREATININE 1 25 2022    GLUC 116 2022    GLUC 117 2022    GLUF 123 (H) 2022    CALCIUM 9 3 2022    CALCIUM 9 2 2022    AST 65 (H) 2022    ALT 51 2022    ALKPHOS 96 2022    TP 6 1 (L) 2022    TBILI 7 70 (H) 2022    EGFR 56 2022    EGFR 58 2022     Interval history:  Clinically stable     ECO - Symptomatic but completely ambulatory    Review of Systems   Constitutional: Negative for activity change, appetite change, fatigue, fever and unexpected weight change  Respiratory: Negative for cough and shortness of breath  Cardiovascular: Positive for leg swelling (Right lower extremity)  Negative for chest pain  Gastrointestinal: Negative for abdominal pain, constipation, diarrhea and nausea  Endocrine: Negative for cold intolerance and heat intolerance  Musculoskeletal: Negative for arthralgias and myalgias  Skin: Negative  Neurological: Negative for dizziness, weakness and headaches  Hematological: Negative for adenopathy  Does not bruise/bleed easily         Current Outpatient Medications:     acetaminophen (TYLENOL) 325 mg tablet, Take 2 tablets (650 mg total) by mouth every 6 (six) hours as needed for mild pain, Disp:  , Rfl: 0    benzonatate (TESSALON PERLES) 100 mg capsule, Take 1 capsule (100 mg total) by mouth 3 (three) times a day, Disp: 20 capsule, Rfl: 0    dabigatran etexilate (PRADAXA) 150 mg capsu, Take 1 capsule (150 mg total) by mouth every 12 (twelve) hours, Disp: 60 capsule, Rfl: 0    furosemide (LASIX) 20 mg tablet, Take 1 tablet (20 mg total) by mouth daily, Disp: 5 tablet, Rfl: 0    hydrochlorothiazide (HYDRODIURIL) 25 mg tablet, Take 1 tablet (25 mg total) by mouth daily (Patient taking differently: Take 25 mg by mouth every morning), Disp: 30 tablet, Rfl: 5    metoprolol succinate (TOPROL-XL) 25 mg 24 hr tablet, Take 0 5 tablets (12 5 mg total) by mouth daily (Patient taking differently: Take 12 5 mg by mouth every morning), Disp: 30 tablet, Rfl: 5    pantoprazole (PROTONIX) 40 mg tablet, Take 1 tablet (40 mg total) by mouth daily (Patient taking differently: Take 40 mg by mouth every morning), Disp: 90 tablet, Rfl: 3    potassium chloride (K-DUR,KLOR-CON) 20 mEq tablet, Take 1 tablet (20 mEq total) by mouth daily (Patient taking differently: Take 20 mEq by mouth every morning), Disp: 30 tablet, Rfl: 3    prazosin (MINIPRESS) 1 mg capsule, Take 1 mg by mouth daily at bedtime , Disp: , Rfl:     predniSONE 20 mg tablet, Take 2 tablets (40 mg total) by mouth daily, Disp: 60 tablet, Rfl: 0    sulfamethoxazole-trimethoprim (BACTRIM DS) 800-160 mg per tablet, Take 1 tablet by mouth 3 (three) times a week Monday, Wednesday and Friday, Disp: 12 tablet, Rfl: 0    traMADol (Ultram) 50 mg tablet, Take 1 tablet (50 mg total) by mouth every 6 (six) hours as needed for moderate pain, Disp: 10 tablet, Rfl: 0    VITAMIN D PO, Take 1,000 Units by mouth daily , Disp: , Rfl:     ascorbic acid (VITAMIN C) 1000 MG tablet, Take 1 tablet (1,000 mg total) by mouth every 12 (twelve) hours for 6 doses (Patient taking differently: Take 1,000 mg by mouth daily Every other day), Disp: 6 tablet, Rfl: 0  No current facility-administered medications for this visit      Facility-Administered Medications Ordered in Other Visits:     acetaminophen (TYLENOL) tablet 650 mg, 650 mg, Oral, Once, [place] Octave, DO  No Known Allergies    Objective   /78 (BP Location: Left arm, Patient Position: Sitting, Cuff Size: Adult)   Pulse 86   Temp (!) 97 4 °F (36 3 °C)   Resp 17   Ht 5' 7 01" (1 702 m)   Wt 86 4 kg (190 lb 8 oz)   SpO2 96%   BMI 29 83 kg/m²   Wt Readings from Last 6 Encounters:   08/24/22 86 4 kg (190 lb 8 oz)   08/23/22 85 7 kg (188 lb 15 oz)   08/22/22 85 7 kg (189 lb)   08/16/22 87 2 kg (192 lb 3 9 oz)   08/10/22 83 9 kg (185 lb)   08/09/22 82 6 kg (182 lb 1 6 oz)     Physical Exam  Vitals reviewed  Constitutional:       Appearance: Normal appearance  He is well-developed  HENT:      Head: Normocephalic and atraumatic  Eyes:      Pupils: Pupils are equal, round, and reactive to light  Pulmonary:      Effort: Pulmonary effort is normal  No respiratory distress  Musculoskeletal:         General: Normal range of motion  Cervical back: Normal range of motion  Right lower leg: Edema present  Lymphadenopathy:      Cervical: No cervical adenopathy  Skin:     General: Skin is warm and dry  Capillary Refill: Capillary refill takes less than 2 seconds  Neurological:      Mental Status: He is alert and oriented to person, place, and time     Psychiatric:         Behavior: Behavior normal      The following historical data was reviewed:  Past Medical History:   Diagnosis Date    Anemia 11/2/2016    Anxiety     Arthritis     Autoimmune hemolytic anemia (HCC)     Claustrophobia     COVID 12/2020    DVT (deep venous thrombosis) (HCC)     GERD (gastroesophageal reflux disease)     Hearing loss, right     Hemolytic anemia (HCC)     History of transfusion     2018 - no adverse reaction    Hypertension     Malignant melanoma of leg, right ([facility] ) 7/1/2022    Palpitation     Portal vein thrombosis     PTSD (post-traumatic stress disorder)     Pulmonary emboli (HCC)     Tobacco abuse      Past Surgical History:   Procedure Laterality Date    CATARACT EXTRACTION Bilateral     CHOLECYSTECTOMY  7/18/2017    COLONOSCOPY      ELBOW ARTHROPLASTY Left     bursectomy    JOINT REPLACEMENT Right 2/2/2021    knee    KNEE SURGERY Right     meniscus tear    LYMPH NODE BIOPSY Right 7/29/2022    Procedure: BIOPSY LYMPH NODE SENTINEL;  Surgeon: Raul Mcdonald MD;  [facility];  Service: Surgical Oncology    W[place] LAP,CHOLECYSTECTOMY/GRAPH N/A 12/23/2017    Procedure: CHOLECYSTECTOMY LAPAROSCOPIC with cholangiogram;  Surgeon: Lashae Parks MD;  [facility];  Service: General    WY REMOVAL SPLEEN, TOTAL N/A 5/18/2017    Procedure: LAPAROSCOPIC HAND ASSIST SPLENECTOMY;  Surgeon: Alfredo Flores MD;  [facility];  Service: Surgical Oncology    W[place] TOTAL KNEE ARTHROPLASTY Right 2/2/2021    Procedure: ARTHROPLASTY KNEE TOTAL;  Surgeon: Danika Brasher MD;  [facility];  Service: Orthopedics    [place] TOTAL KNEE ARTHROPLASTY Left 11/17/2021    Procedure: TOTAL KNEE REPLACEMENT;  Surgeon: Danika Brasher MD;  [facility];  Service: Orthopedics    SHOULDER SURGERY Left     rotator cuff x4, reconstruction    SKIN LESION EXCISION Right 7/29/2022    Procedure: WIDE EXCISION RIGHT MEDIAL THIGH;  Surgeon: Raul Mcdonald MD;  [facility];  Service: Surgical Oncology     Please note: This report has been generated by a voice recognition software system  Therefore there may be syntax, spelling, and/or grammatical errors  Please call if you have any questions

## 2022-08-25 ENCOUNTER — TELEPHONE (OUTPATIENT)
Dept: GYNECOLOGIC ONCOLOGY | Facility: CLINIC | Age: 67
End: 2022-08-25

## 2022-08-25 ENCOUNTER — TELEPHONE (OUTPATIENT)
Dept: HEMATOLOGY ONCOLOGY | Facility: CLINIC | Age: 67
End: 2022-08-25

## 2022-08-25 ENCOUNTER — TELEPHONE (OUTPATIENT)
Dept: SURGICAL ONCOLOGY | Facility: CLINIC | Age: 67
End: 2022-08-25

## 2022-08-25 ENCOUNTER — HOSPITAL ENCOUNTER (OUTPATIENT)
Dept: RADIOLOGY | Age: 67
Discharge: HOME | End: 2022-08-25
Payer: MEDICARE

## 2022-08-25 DIAGNOSIS — C43.71 MALIGNANT MELANOMA OF LEG, RIGHT (HCC): ICD-10-CM

## 2022-08-25 LAB — GLUCOSE SERPL-MCNC: 132 MG/DL (ref 65–140)

## 2022-08-25 PROCEDURE — G1004 CDSM NDSC: HCPCS

## 2022-08-25 PROCEDURE — 78816 PET IMAGE W/CT FULL BODY: CPT

## 2022-08-25 PROCEDURE — 82948 REAGENT STRIP/BLOOD GLUCOSE: CPT

## 2022-08-25 PROCEDURE — A9552 F18 FDG: HCPCS

## 2022-08-25 NOTE — TELEPHONE ENCOUNTER
Scheduling Appointment SEND TO [facility]    Who Is Calling to Schedule self   Doctor Dr Caryn Hamman   Date and Time 9/7 11:30AM   Reason for scheduling appointment Returning call, could not make appmt suggested by Dr Storm Burris message   Patient verbalized understanding    yes

## 2022-08-25 NOTE — TELEPHONE ENCOUNTER
Left message for patient to call the office back to schedule FU appt with Dr Sarah Jaime on 8/31/22 at 10:45am or 11:45am to assess sentinel lymph node biopsy site

## 2022-08-25 NOTE — TELEPHONE ENCOUNTER
Called and spoke with patient  He states the area from SLNbx is getting better  He has made an appointment with Dr Marietta Chang on 9/7  He states if the area improves by then he will cancel  Will update Dr Dago Rothman

## 2022-08-25 NOTE — TELEPHONE ENCOUNTER
----- Message from Zoey Prajapati MD sent at 8/25/2022  1:08 PM EDT -----  Can you call him and let him know that Dr Marvel Suazo will call to get him fit in to drain his enlarged lfuid/LN  ----- Message -----  From: Karen Holliday MD  Sent: 8/25/2022  10:38 AM EDT  To: Zoey Prajapati MD    [address]   We'll get him in  Thanks    ----- Message -----  From: Zoey Prajapati MD  Sent: 8/25/2022  12:28 AM EDT  To: Karen Holliday MD    Mr Nemo Lee is complaining that the area from SLNbx continues to enlarge - large nodule/most likely seroma , and it is painful and pressing on his lower extremity, impeding fluid outflow    Can this be drained?    m

## 2022-08-25 NOTE — PROGRESS NOTES
[facility] [place]  [address] [facility] [address]  [phone]     Date of Visit: 8/22/2022  Name: Alise Shi   YOB: 1955        Subjective    VISIT DIAGNOSIS:  Diagnoses and all orders for this visit:    Malignant melanoma of right lower extremity including hip (HCC)  -     CBC and differential; Future  -     Comprehensive metabolic panel; Future  -     LD,Blood; Future    Malignant melanoma of leg, right (HCC)    Autoimmune hemolytic anemia (HCC)        Oncology History   Malignant melanoma of leg, right ([facility] [address] )   5/31/2022 Biopsy    Right Leg, Shave Biopsy   Melanoma extending to the deep specimen margin  Thickness: 7 0mm  Ulceration: not seen   Mitoses: 3      5/31/2022 -  Cancer Staged    Staging form: Melanoma of the Skin, AJCC 8th Edition  - Clinical stage from 5/31/2022: Stage IIB (cT4a, cN0, cM0) - Signed by Puja Queen MD on 8/25/2022 7/1/2022 Initial Diagnosis    Malignant melanoma of leg, right ([facility] [address] )     7/29/2022 Surgery    A  Lymph node, sentinel, #1:  One lymph node with rare metastatic melanoma cells (1/1), subcapsular location  Immunohistochemical study for MART-1, HMB45 and S100 supports the findings  B  Leg, right, knee, wide excision:  Residual melanoma, nodular type, and scar; margins free  See synoptic report       7/29/2022 -  Cancer Staged    Staging form: Melanoma of the Skin, AJCC 8th Edition  - Pathologic stage from 7/29/2022: Stage IIIC (pT4a, pN1a, cM0) - Signed by Puja Queen MD on 8/25/2022          Cancer Staging  Stage IIIC ([id number])      Treatment Details   Treatment goal [No plan goal]   Plan Name ONE-TIME BLOOD PRODUCT TRANSFUSION PLAN   Status Active   Start Date 7/15/2022   End Date Until discontinued   Provider VISHNU Hui   Chemotherapy [No matching medication found in this treatment plan]     Treatment Details   Treatment goal Palliative   Plan Name OP RiTUXimab weekly x 4, every 6 months   Status Active   Start Date 8/1/2022   End Date 8/23/2022   Provider Andreina Garrett,    Chemotherapy riTUXimab (RITUXAN) subsequent titrated chemo infusion, 375 mg/m2 = 746 2 mg, Intravenous, Once, 1 of 1 cycle  Administration: 746 2 mg (8/9/2022), 746 2 mg (8/16/2022), 746 2 mg (8/23/2022)    riTUXimab (RITUXAN) first titrated chemo infusion, 375 mg/m2 = 746 2 mg, Intravenous, Once, 1 of 1 cycle  Administration: 746 2 mg (8/1/2022)          HISTORY OF PRESENT ILLNESS: Alaina Contreras is a 75 y o  male  who have hemolytic anemia with ongoing treatment with Rituxan here for discussion of new diagnosis of melanoma  Mr Taiwo Orta has a longstanding history of hemolytic anemia treated with rituximab, he described an agent that was chemotherapy, high-dose steroids over the past 6-7 years currently on prednisone 40 mg daily and now receiving weekly Rituxan with anticipation of his 4th dose this week  He states that at approximately year ago he noticed a small blood blister on his right knee and was underneath the skin  He states that over the past 6 months it has grown significantly and had become elevated and above the skin  He states he grew very fast and then it was angry looking and describes it is being size the dawson and crusting  He says it would itch, bleed, and was painful  He saw Dr Esther Lord and underwent a biopsy on 05/31/2022  Pathology demonstrated invasive melanoma at least 7 0 mm Breslow thickness, no ulceration, at knees Allen level 4, nodular melanoma, 3 mitoses per mm2, and present TILs that were non brisk  Saw Dr Vikki Mckinney and underwent wide local excision and sentinel lymph node biopsy on 07/29/2022  Pathology demonstrated residual melanoma, nodular type a with scar and negative margins  Pathology demonstrated tumor thickness of 8 mm, no ulceration, for mitoses per mm squared and Allen level 5    [place] lymph node demonstrated rare metastatic melanoma cells in the subcapsular location  He has recovered from surgery and doing well  He states that the area where the sentinel lymph node biopsy was removed continues to grow in size with a single nodule/mass that is enlarging  He feels that is pushing on his leg and causing his increasing right lower extremity swelling  It is painful and uncomfortable  He was hospitalized during his surgical procedure as any procedure exacerbations his hemolytic anemia  His current following with Dr Travis Davis  He describes having normal sun exposure when he was younger but most recently increased sun exposure as he has been over in the middle [address] [place] [address] for 17 years  He does describe having increased number of sunburns  He denies any blistering sunburns  He has used to tanning bed but describes less than 10 times  He thinks his dad may have had melanoma he is a little unclear  His mother was diagnosed with breast cancer in her late 62s  He denies a family history of ovarian or pancreatic cancer  He has 2 children aged 39 and 27  He has brown hair when he was younger, has brown a, and his father's and [place] in descent  He is up-to-date on colonoscopy and PSA  He occasionally smokes a cigar  He has not smoked cigarettes  Uses alcohol occasionally  And does use medical marijuana approximately 2 times per week  He is scheduled for a PET scan later this week and a brain MRI next month  REVIEW OF SYSTEMS:  Review of Systems   Constitutional: Negative for appetite change, fatigue, fever and unexpected weight change  HENT:   Negative for lump/mass  Eyes: Negative for icterus  Respiratory: Negative for cough, shortness of breath and wheezing  Cardiovascular: Positive for leg swelling (R >>>L)  Gastrointestinal: Negative for abdominal pain, constipation, diarrhea, nausea and vomiting  Genitourinary: Negative for difficulty urinating and hematuria      Musculoskeletal: Negative for arthralgias, gait problem and myalgias  Skin: Negative for itching and rash  No new, changing, or concerning lesions  Neurological: Negative for extremity weakness, gait problem, headaches, light-headedness and numbness  Hematological: Negative for adenopathy          MEDICATIONS:    Current Outpatient Medications:     acetaminophen (TYLENOL) 325 mg tablet, Take 2 tablets (650 mg total) by mouth every 6 (six) hours as needed for mild pain, Disp:  , Rfl: 0    benzonatate (TESSALON PERLES) 100 mg capsule, Take 1 capsule (100 mg total) by mouth 3 (three) times a day, Disp: 20 capsule, Rfl: 0    dabigatran etexilate (PRADAXA) 150 mg capsu, Take 1 capsule (150 mg total) by mouth every 12 (twelve) hours, Disp: 60 capsule, Rfl: 0    hydrochlorothiazide (HYDRODIURIL) 25 mg tablet, Take 1 tablet (25 mg total) by mouth daily (Patient taking differently: Take 25 mg by mouth every morning), Disp: 30 tablet, Rfl: 5    metoprolol succinate (TOPROL-XL) 25 mg 24 hr tablet, Take 0 5 tablets (12 5 mg total) by mouth daily (Patient taking differently: Take 12 5 mg by mouth every morning), Disp: 30 tablet, Rfl: 5    pantoprazole (PROTONIX) 40 mg tablet, Take 1 tablet (40 mg total) by mouth daily (Patient taking differently: Take 40 mg by mouth every morning), Disp: 90 tablet, Rfl: 3    potassium chloride (K-DUR,KLOR-CON) 20 mEq tablet, Take 1 tablet (20 mEq total) by mouth daily (Patient taking differently: Take 20 mEq by mouth every morning), Disp: 30 tablet, Rfl: 3    prazosin (MINIPRESS) 1 mg capsule, Take 1 mg by mouth daily at bedtime , Disp: , Rfl:     predniSONE 20 mg tablet, Take 2 tablets (40 mg total) by mouth daily, Disp: 60 tablet, Rfl: 0    sulfamethoxazole-trimethoprim (BACTRIM DS) 800-160 mg per tablet, Take 1 tablet by mouth 3 (three) times a week Monday, Wednesday and Friday, Disp: 12 tablet, Rfl: 0    traMADol (Ultram) 50 mg tablet, Take 1 tablet (50 mg total) by mouth every 6 (six) hours as needed for moderate pain, Disp: 10 tablet, Rfl: 0    VITAMIN D PO, Take 1,000 Units by mouth daily , Disp: , Rfl:     ascorbic acid (VITAMIN C) 1000 MG tablet, Take 1 tablet (1,000 mg total) by mouth every 12 (twelve) hours for 6 doses (Patient taking differently: Take 1,000 mg by mouth daily Every other day), Disp: 6 tablet, Rfl: 0    furosemide (LASIX) 20 mg tablet, Take 1 tablet (20 mg total) by mouth daily, Disp: 5 tablet, Rfl: 0  No current facility-administered medications for this visit      Facility-Administered Medications Ordered in Other Visits:     acetaminophen (TYLENOL) tablet 650 mg, 650 mg, Oral, Once, Mague Dang,      ALLERGIES:  No Known Allergies     ACTIVE PROBLEMS:  Patient Active Problem List   Diagnosis    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    Hyperbilirubinemia    Symptomatic anemia    Pulmonary embolism with acute cor pulmonale (HCC)    Portal vein thrombosis    Elevated blood pressure reading without diagnosis of hypertension    Shortness of breath    Gastroesophageal reflux disease    Autoimmune hemolytic anemia    ARABELLA [facility]acute kidney injury) ([facility] [address] )    Splenomegaly    Iron overload due to repeated red blood cell transfusions    Posttraumatic stress disorder    Essential hypertension    Glucose intolerance (impaired glucose tolerance)    Renal insufficiency    Auto immune neutropenia (HCC)    Primary osteoarthritis of left knee    Pain in joint, lower leg    Pain in left knee    COVID-19    Thrombocytosis    Primary localized osteoarthrosis of the knee, right    Hyperglycemia    Chronic bilateral low back pain with bilateral sciatica    Hypercholesterolemia    Malignant melanoma of leg, right (HCC)    Dyspnea    Acute respiratory failure with hypoxia (HCC)    Elevated serum creatinine    Elevated bilirubin    Leukocytosis          PAST MEDICAL HISTORY:   Past Medical History:   Diagnosis Date    Anemia 11/2/2016    Anxiety     Arthritis     Autoimmune hemolytic anemia ([facility] [facility] )     Claustrophobia     COVID 12/2020    DVT (deep venous thrombosis) (HCC)     GERD (gastroesophageal reflux disease)     Hearing loss, right     Hemolytic anemia ([facility] )     History of transfusion     2018 - no adverse reaction    Hypertension     Malignant melanoma of leg, right ([facility] [address] ) 7/1/2022    Palpitation     Portal vein thrombosis     PTSD (post-traumatic stress disorder)     Pulmonary emboli (HCC)     Tobacco abuse         PAST SURGICAL HISTORY:  Past Surgical History:   Procedure Laterality Date    CATARACT EXTRACTION Bilateral     CHOLECYSTECTOMY  7/18/2017    COLONOSCOPY      ELBOW ARTHROPLASTY Left     bursectomy    JOINT REPLACEMENT Right 2/2/2021    knee    KNEE SURGERY Right     meniscus tear    LYMPH NODE BIOPSY Right 7/29/2022    Procedure: BIOPSY LYMPH NODE SENTINEL;  Surgeon: Ramy Tello MD;  [facility];  Service: Surgical Oncology    MS LAP,CHOLECYSTECTOMY/GRAPH N/A 12/23/2017    Procedure: CHOLECYSTECTOMY LAPAROSCOPIC with cholangiogram;  Surgeon: Layton Bowen MD;  [facility];  Service: General    MS REMOVAL SPLEEN, TOTAL N/A 5/18/2017    Procedure: LAPAROSCOPIC HAND ASSIST SPLENECTOMY;  Surgeon: Tiburcio Garcia MD;  [facility];  Service: Surgical Oncology    MS TOTAL KNEE ARTHROPLASTY Right 2/2/2021    Procedure: ARTHROPLASTY KNEE TOTAL;  Surgeon: Rosette Ford MD;  [facility];  Service: Orthopedics    MS TOTAL KNEE ARTHROPLASTY Left 11/17/2021    Procedure: TOTAL KNEE REPLACEMENT;  Surgeon: Rosette Ford MD;  [facility];  Service: Orthopedics    SHOULDER SURGERY Left     rotator cuff x4, reconstruction    SKIN LESION EXCISION Right 7/29/2022    Procedure: WIDE EXCISION RIGHT MEDIAL THIGH;  Surgeon: Ramy Tello MD;  [facility];  Service: Surgical Oncology        SOCIAL HISTORY:  Social History     Socioeconomic History    Marital status: /Civil Union     Spouse name: None    Number of children: None    Years of education: None    Highest education level: None   Occupational History    None   Tobacco Use    Smoking status: Current Some Day Smoker     Packs/day: 0 00     Years: 5 00     Pack years: 0 00     Types: Cigars    Smokeless tobacco: Current User     Types: Snuff    Tobacco comment: 5  a week average   Vaping Use    Vaping Use: Never used   Substance and Sexual Activity    Alcohol use: Yes     Alcohol/week: 6 0 standard drinks     Types: 6 Cans of beer per week     Comment: socially    Drug use: Yes     Frequency: 3 0 times per week     Types: Marijuana     Comment: medical marijuana    Sexual activity: Yes     Partners: Female     Birth control/protection: None   Other Topics Concern    None   Social History Narrative    Daily coffee consumption (2 cups/day)    reitred     Social Determinants of Health     Financial Resource Strain: Not on file   Food Insecurity: No Food Insecurity    Worried About Running Out of Food in the Last Year: Never true    Michelle of Food in the Last Year: Never true   Transportation Needs: No Transportation Needs    Lack of Transportation (Medical): No    Lack of Transportation (Non-Medical): No   Physical Activity: Not on file   Stress: Not on file   Social Connections: Not on file   Intimate Partner Violence: Not on file   Housing Stability: Low Risk     Unable to Pay for Housing in the Last Year: No    Number of Places Lived in the Last Year: 1    Unstable Housing in the Last Year: No        FAMILY HISTORY:  Family History   Problem Relation Age of Onset    Cancer Mother     Breast cancer Mother     Other Father         CABG    Heart attack Paternal Grandfather         acute MI           Objective    PHYSICAL EXAMINATION:   Blood pressure 148/78, pulse 80, temperature 98 °F (36 7 °C), resp  rate 18, height 5' 7 01" (1 702 m), weight 85 7 kg (189 lb), SpO2 98 %       Pain Score: 0-No pain     ECOG Performance Status    Flowsheet Row Most Recent Value   ECOG Performance Status 0 - Fully active, able to carry on all pre-disease performance without restriction           Physical Exam  Constitutional:       General: He is not in acute distress  Appearance: Normal appearance  He is not toxic-appearing  HENT:      Mouth/Throat:      Mouth: Mucous membranes are moist       Pharynx: Oropharynx is clear  Eyes:      General: Scleral icterus (trace) present  Cardiovascular:      Rate and Rhythm: Normal rate and regular rhythm  Pulses: Normal pulses  Heart sounds: No murmur heard  No friction rub  No gallop  Pulmonary:      Effort: Pulmonary effort is normal  No respiratory distress  Breath sounds: Normal breath sounds  No wheezing or rales  Chest:   Breasts:      Right: No axillary adenopathy or supraclavicular adenopathy  Left: No axillary adenopathy or supraclavicular adenopathy  Abdominal:      General: There is no distension  Palpations: There is no mass  Tenderness: There is no abdominal tenderness  There is no rebound  Musculoskeletal:         General: No swelling or tenderness  Right lower leg: Edema (pitting edema up to knee ) present  Left lower leg: Edema (mild pitting edema at ankles) present  Lymphadenopathy:      Head:      Right side of head: No submandibular, preauricular or posterior auricular adenopathy  Left side of head: No submandibular, preauricular or posterior auricular adenopathy  Cervical: No cervical adenopathy  Right cervical: No superficial or posterior cervical adenopathy  Left cervical: No superficial or posterior cervical adenopathy  Upper Body:      Right upper body: No supraclavicular or axillary adenopathy  Left upper body: No supraclavicular or axillary adenopathy  Lower Body: No right inguinal adenopathy  No left inguinal adenopathy  Skin:     Findings: No rash  Comments:  Well healed surgical scar  3 cm nodule at site of scar/SLN excision  Painful/uncomfortable  Neurological:      General: No focal deficit present  Mental Status: He is alert and oriented to person, place, and time  Psychiatric:         Mood and Affect: Mood normal          Behavior: Behavior normal          Thought Content: Thought content normal          Judgment: Judgment normal          I reviewed lab data in the chart      WBC   Date Value Ref Range Status   08/23/2022 14 88 (H) 4 31 - 10 16 Thousand/uL Final   08/15/2022 13 39 (H) 4 31 - 10 16 Thousand/uL Final   08/08/2022 11 93 (H) 4 31 - 10 16 Thousand/uL Final     Hemoglobin   Date Value Ref Range Status   08/23/2022 10 2 (L) 12 0 - 17 0 g/dL Final   08/15/2022 9 1 (L) 12 0 - 17 0 g/dL Final   08/08/2022 8 5 (L) 12 0 - 17 0 g/dL Final     Platelets   Date Value Ref Range Status   08/23/2022 494 (H) 149 - 390 Thousands/uL Final   08/15/2022 867 (H) 149 - 390 Thousands/uL Final   08/08/2022 608 (H) 149 - 390 Thousands/uL Final     MCV   Date Value Ref Range Status   08/23/2022 131 (H) 82 - 98 fL Final   08/15/2022 128 (H) 82 - 98 fL Final   08/08/2022 131 (H) 82 - 98 fL Final      Potassium   Date Value Ref Range Status   08/04/2022 3 1 (L) 3 5 - 5 3 mmol/L Final   08/04/2022 3 1 (L) 3 5 - 5 3 mmol/L Final   08/03/2022 3 2 (L) 3 5 - 5 3 mmol/L Final     Chloride   Date Value Ref Range Status   08/04/2022 106 96 - 108 mmol/L Final   08/04/2022 106 96 - 108 mmol/L Final   08/03/2022 106 96 - 108 mmol/L Final     CO2   Date Value Ref Range Status   08/04/2022 28 21 - 32 mmol/L Final   08/04/2022 27 21 - 32 mmol/L Final   08/03/2022 27 21 - 32 mmol/L Final     CO2, i-STAT   Date Value Ref Range Status   05/18/2017 26 21 - 32 mmol/L Final     BUN   Date Value Ref Range Status   08/04/2022 34 (H) 5 - 25 mg/dL Final   08/04/2022 34 (H) 5 - 25 mg/dL Final   08/03/2022 43 (H) 5 - 25 mg/dL Final     Creatinine   Date Value Ref Range Status   08/04/2022 1 29 0 60 - 1 30 mg/dL Final     Comment:     Standardized to IDMS reference method   08/04/2022 1 25 0 60 - 1 30 mg/dL Final     Comment:     Standardized to IDMS reference method   08/03/2022 1 28 0 60 - 1 30 mg/dL Final     Comment:     Standardized to IDMS reference method     Glucose   Date Value Ref Range Status   08/04/2022 116 65 - 140 mg/dL Final     Comment:     If the patient is fasting, the ADA then defines impaired fasting glucose as > 100 mg/dL and diabetes as > or equal to 123 mg/dL  Specimen collection should occur prior to Sulfasalazine administration due to the potential for falsely depressed results  Specimen collection should occur prior to Sulfapyridine administration due to the potential for falsely elevated results  08/04/2022 117 65 - 140 mg/dL Final     Comment:     If the patient is fasting, the ADA then defines impaired fasting glucose as > 100 mg/dL and diabetes as > or equal to 123 mg/dL  Specimen collection should occur prior to Sulfasalazine administration due to the potential for falsely depressed results  Specimen collection should occur prior to Sulfapyridine administration due to the potential for falsely elevated results  08/03/2022 103 65 - 140 mg/dL Final     Comment:     If the patient is fasting, the ADA then defines impaired fasting glucose as > 100 mg/dL and diabetes as > or equal to 123 mg/dL  Specimen collection should occur prior to Sulfasalazine administration due to the potential for falsely depressed results  Specimen collection should occur prior to Sulfapyridine administration due to the potential for falsely elevated results       Calcium   Date Value Ref Range Status   08/04/2022 9 3 8 3 - 10 1 mg/dL Final   08/04/2022 9 2 8 3 - 10 1 mg/dL Final   08/03/2022 9 1 8 3 - 10 1 mg/dL Final     Albumin   Date Value Ref Range Status   08/04/2022 3 5 3 5 - 5 0 g/dL Final   08/02/2022 3 2 (L) 3 5 - 5 0 g/dL Final   08/01/2022 3 1 (L) 3 5 - 5 0 g/dL Final     Total Bilirubin   Date Value Ref Range Status   08/04/2022 7 70 (H) 0 20 - 1 00 mg/dL Final     Comment:     Use of this assay is not recommended for patients undergoing treatment with eltrombopag due to the potential for falsely elevated results  08/02/2022 8 53 (H) 0 20 - 1 00 mg/dL Final     Comment:     Use of this assay is not recommended for patients undergoing treatment with eltrombopag due to the potential for falsely elevated results  08/01/2022 8 08 (H) 0 20 - 1 00 mg/dL Final     Comment:     Use of this assay is not recommended for patients undergoing treatment with eltrombopag due to the potential for falsely elevated results  Alkaline Phosphatase   Date Value Ref Range Status   08/04/2022 96 46 - 116 U/L Final   08/02/2022 91 46 - 116 U/L Final   08/01/2022 78 46 - 116 U/L Final     AST   Date Value Ref Range Status   08/04/2022 65 (H) 5 - 45 U/L Final     Comment:     Specimen collection should occur prior to Sulfasalazine administration due to the potential for falsely depressed results  08/02/2022 115 (H) 5 - 45 U/L Final     Comment:     Specimen collection should occur prior to Sulfasalazine administration due to the potential for falsely depressed results  08/01/2022 86 (H) 5 - 45 U/L Final     Comment:     Specimen collection should occur prior to Sulfasalazine administration due to the potential for falsely depressed results  ALT   Date Value Ref Range Status   08/04/2022 51 12 - 78 U/L Final     Comment:     Specimen collection should occur prior to Sulfasalazine and/or Sulfapyridine administration due to the potential for falsely depressed results  08/02/2022 63 12 - 78 U/L Final     Comment:     Specimen collection should occur prior to Sulfasalazine and/or Sulfapyridine administration due to the potential for falsely depressed results      08/01/2022 47 12 - 78 U/L Final     Comment:     Specimen collection should occur prior to Sulfasalazine and/or Sulfapyridine administration due to the potential for falsely depressed results  LD   Date Value Ref Range Status   07/30/2022 1,077 (H) 81 - 234 U/L Final   12/22/2020 754 (H) 81 - 234 U/L Final   12/20/2020 1,083 (H) 81 - 234 U/L Final     Comment:     15     LDH   Date Value Ref Range Status   03/11/2021 368 (H) 121 - 224 IU/L Final     No results found for: TSH  No results found for: [id number]   No results found for: FREET4      RECENT IMAGING:  Procedure: XR chest portable    Result Date: 7/31/2022  Narrative: CHEST INDICATION:   Hypoxemia  COMPARISON:  7/29/2022 EXAM PERFORMED/VIEWS:  XR CHEST PORTABLE FINDINGS: Cardiomediastinal silhouette appears unremarkable  The lungs are clear  No pneumothorax or pleural effusion  Osseous structures appear within normal limits for patient age  Impression: No acute cardiopulmonary disease  Workstation performed: [id number]     Procedure: XR chest portable    Result Date: 7/29/2022  Narrative: CHEST INDICATION:   Shortness of breath  COMPARISON:  12/18/2020  EXAM PERFORMED/VIEWS:  XR CHEST PORTABLE FINDINGS:  Monitoring leads and clips project over the chest  Cardiomediastinal silhouette appears stable  The lungs are clear  No pneumothorax or pleural effusion  Degenerative changes  Left upper quadrant embolization coils  Impression: No acute cardiopulmonary disease  Workstation performed: [id number]     Procedure: NM lymphatic melanoma    Result Date: 7/29/2022  Narrative: SENTINEL NODE LYMPHOSCINTIGRAPHY INDICATION:   Lower extremity melanoma  FINDINGS: 0 55 mCi Tc-99m sulfur colloid (0 6 cc volume) was administered intradermally in divided doses by Dr Lino Finney in the region of the patients right lower extremity melanoma  Scintigraphic images were obtained over the right groin and right lower extremity in multiple projections  Single node is identified within the right groin Using scintigraphic guidance, the corresponding skin site was marked with an indelible marker    The patient was transferred to the operating room in satisfactory condition  Impression: 1  [place] lymph node localized to right inguinal region  Workstation performed: [id number]     Procedure: CTA chest pe study    Result Date: 7/31/2022  Narrative: CTA - CHEST WITH IV CONTRAST - PULMONARY ANGIOGRAM INDICATION:   tachypnea, tachycardia, R/O PE  COMPARISON: 4/30/2022  TECHNIQUE: CTA examination of the chest was performed using angiographic technique according to a protocol specifically tailored to evaluate for pulmonary embolism  Axial, sagittal, and coronal 2D reformatted images were created from the source data and  submitted for interpretation  In addition, coronal 3D MIP postprocessing was performed on the acquisition scanner  Radiation dose length product (DLP) for this visit:  572 mGy-cm   This examination, like all CT scans performed in the [facility], was performed utilizing techniques to minimize radiation dose exposure, including the use of iterative reconstruction and automated exposure control  IV Contrast:  70 mL of iohexol (OMNIPAQUE)  FINDINGS: PULMONARY ARTERIAL TREE:  Scattered segmental/subsegmental pulmonary emboli are seen throughout the lung    LUNGS:  Scattered groundglass opacities suggests a small vessel or small airways process process  There is no tracheal or endobronchial lesion  PLEURA:  Unremarkable  HEART/GREAT VESSELS:  RV LV ratio is greater than 0 9 concerning for right ventricular strain    No thoracic aortic aneurysm  MEDIASTINUM AND BRENDA:  Unremarkable  CHEST WALL AND LOWER NECK:   Unremarkable  VISUALIZED STRUCTURES IN THE UPPER ABDOMEN:  Unremarkable  OSSEOUS STRUCTURES:  No acute fracture or destructive osseous lesion  Impression: Right middle lobe pulmonary embolism are noted    RV LV ratio is greater than 0 9 concerning for right ventricular strain     I personally discussed this study with [address] [place] [address] on 7/31/2022 at 2:17 AM  Workstation performed: [id number] Procedure: VAS lower limb venous duplex study, complete bilateral    Result Date: 8/1/2022  Narrative:  [facility] REPORT CLINICAL: Indications: PT C/O PE  He is currently receiving IV heparin  Physician wants to rule out B/L LE DVT  Operative History: No prior heart or vascular surgery Risk Factors The patient has history of HTN, current smoking, GERD, autoimmune hemolytic anemia, malignant melanoma right leg, COVID 19 virus (12/20), PE, portal vein thrombus, PTSD, and DVT  Height:  67 inches  Weight:  192 lbs  CONCLUSION:  Impression:  RIGHT LOWER LIMB: Based on color flow imaging, evaluation shows no evidence of acute deep vein thrombosis  The common femoral artery and sapheno-femoral junction were not evaluated secondary to lymph node removal at groin/pain  Tech note: A non-vascular structure measuring 3 47 cm was identified at the groin at the lymph node removal site  No evidence of superficial thrombophlebitis noted  Doppler evaluation shows a normal response to augmentation maneuvers  Popliteal, posterior tibial, and anterior tibial arterial Doppler waveforms are triphasic  LEFT LOWER LIMB: No evidence of acute or chronic deep vein thrombosis  No evidence of superficial thrombophlebitis noted  Doppler evaluation shows a normal response to augmentation maneuvers  Popliteal, posterior tibial, and anterior tibial arterial Doppler waveforms are triphasic  Technical findings were posted on EPIC  SIGNATURE: Electronically Signed by: Shantell Xie on 2022-08-01 09:28:42 AM    Procedure: US bedside procedure    Result Date: 7/31/2022  Narrative: 1 2 [id number]    Procedure: Echo complete w/ contrast if indicated    Result Date: 7/31/2022  Narrative: Ardeth Needs  Left Ventricle: Left ventricular cavity size is normal  Wall thickness is mildly increased  The left ventricular ejection fraction is 70%  Systolic function is hyperdynamic   Wall motion is normal   Right Ventricle: Right ventricular cavity size is dilated  Systolic function is mildly reduced    Tricuspid Valve: There is moderate to severe regurgitation  The right ventricular systolic pressure is severely elevated  The estimated right ventricular systolic pressure is 80 mmHg  Assessment/Plan  Mr Tori Bentley is a 75 yr male with Stage IIIC melanoma here for disease management discussion  1  Malignant melanoma of right lower extremity including hip ([facility] [address] )  2  Malignant melanoma of leg, right ([facility] [address] )    I had an extensive discussion with the patient regarding his  diagnosis, prognosis, and recommendations for further management  We reviewed his melanoma history, current findings, pathology and imaging tests  We discussed that he has a thick melanoma and is at least stage IIIC  He does have imaging scheduled for this week and in the beginning of September to evaluate final staging  For we discussed that he has a positive sentinel lymph node in the past we automatically recommended a completion lymph node dissection  We discussed whether or not a CLND is needed  We discussed the principle and reasoning behind CLND, including accurate staging and local disease control  We also discussed the newer surgical data that state there is no overall survival difference between immediate CLND vs no CLND now and monitoring with US and CLND if there is a recurrence at the time  Therefore, I would consider no CLND and close monitoring with US of the LN basin  If he does not have metastatic disease  We discussed the principle and reasoning behind adjuvant treatment  We discussed the data in Stage III melanoma and the overall reduced RFS and improved PFS in Stage III melanomas treated with nivolumab in the adjuvant setting  We also discussed the use of BRAF/MEK inhibitors in the adjuvant setting and the overall reduced RFS and improved PFS in Stage III melanoma treated with dabrafenib and trametinib    We discussed the need to have a BRAF V600 mutation in order to be eligible for this treatment  We will send his tumor for molecular testing  We discussed that in the setting of hemolytic anemia on treatment with rituximab and prednisone, I would not necessarily recommend treatment with immunotherapy at this time  We can consider use of immunotherapy in the future if needed  As such I discussed treatment with BRAF/MEK inhibitors in the adjuvant setting to reduce the risk of recurrence  he has a BRAF V600K mutation and would be eligible for targeted therapy with BRAF/MEK inhibitor  We did discuss the concern for development of treatment resistance  We discussed the side effects of the combination of the inhibitors which includes but is not limited to rash, migratory arthralgias and myalgias, GI distress, LFT abnormalities, potential for development of SCC, and cardiac toxicity  We discussed that there are a few companies that make these drug combination and that unique side effects included extreme photosensitivity, pyrexia/fevers, and ocular toxicity for each of the different combinations  I discussed that final recommendations will be made on final staging, which will require his imaging  He is due soon  He is unsure if he would like to undergo adjuvant treatment, which is understandable  I did provide him with information regarding BRAF and MEK inhibitors  He will think about undergoing treatment  He he will return in approximately 1 month at that time we should have B Alfredo results, and results from staging PET scan and brain MRI  We discussed that he will require ongoing monitoring and surveillance, both for disease recurrence as well as a new primary melanoma as well as other nonmelanoma skin cancers   This includes physical exams and labs, including a comprehensive metabolic panel, CBC with differential and LDH; periodic imaging studies with either CT chest, abdomen and pelvis or PET/CT scans every 6 months  We discussed the importance of regular cutaneous self examinations and reviewed the features of lesions that could be concerning for skin cancer  I did explain that he  is at risk for developing another primary melanoma as well  We also discussed avoidance of unnecessary sun exposure and use of sun protective clothing and sunscreen  I also recommended routine follow up and skin checks with dermatology  Family Screening: his  first-degree relatives, namely his siblings, parents, and children, have an increased risk of developing a melanoma over the general population given  his  diagnosis of melanoma, and should have annual dermatologic screening  I did discuss that he might benefit from treatment with Lasix to help with lower extremity edema  He has bilateral swelling, although right swelling is much greater than the left  Possible that he has some excessive fluid overload and might benefit from treatment with furosemide for a few days  I asked that his P to his primary care doctor regarding this as he would need potassium monitored as well  Will continue to monitor his legs and swelling and obtain further imaging if needed  He will return for follow-up after completing imaging  He knows to call with any issues or concerns prior to his next visit  - CBC and differential; Future  - Comprehensive metabolic panel; Future  - LD,Blood; Future        3  Autoimmune hemolytic anemia (HCC)  Currently on prednisone 40 mg daily and ongoing infusions with rituximab at this time  Continue follow-up we she  This will limit potential treatment options for his melanoma however we will deal with treatment options and side effects based on disease stage  Return in about 4 weeks (around 9/19/2022) for Office Visit, labs       Zunilda Souza MD, PhD

## 2022-08-25 NOTE — TELEPHONE ENCOUNTER
----- Message from Rod Nicole RN sent at 8/25/2022 11:50 AM EDT -----  Hi! Can someone please call and schedule Jesús for a f/u with Dr Nicolette Retana next week to assess his sentinel lymph node biopsy site? Thank you!

## 2022-08-26 ENCOUNTER — TELEPHONE (OUTPATIENT)
Dept: HEMATOLOGY ONCOLOGY | Facility: CLINIC | Age: 67
End: 2022-08-26

## 2022-08-26 NOTE — TELEPHONE ENCOUNTER
----- Message from Asya Carey MD sent at 8/26/2022 11:27 AM EDT -----  Can you let him know that his PET is negative for metastatic disease  No evidence of anything - other than fluid collection in his ingunal area - which he  knows about as it is a big ball he feels  Thanks  Kena Wilkins  ----- Message -----  From: Asya Carey MD  Sent: 8/25/2022  12:47 AM EDT  To: Asya Carey MD    Check for PET results

## 2022-08-26 NOTE — TELEPHONE ENCOUNTER
Called and spoke with patient's wife Caremlo Guzman as patient is outside mowin the grass  Discussed PET results  All questions answered and Carmelo Guzman verbalized understanding  She will communicate results to patient

## 2022-08-30 ENCOUNTER — APPOINTMENT (OUTPATIENT)
Dept: LAB | Facility: MEDICAL CENTER | Age: 67
End: 2022-08-30
Payer: MEDICARE

## 2022-09-07 ENCOUNTER — APPOINTMENT (OUTPATIENT)
Dept: LAB | Facility: MEDICAL CENTER | Age: 67
End: 2022-09-07
Payer: MEDICARE

## 2022-09-07 ENCOUNTER — OFFICE VISIT (OUTPATIENT)
Dept: SURGICAL ONCOLOGY | Facility: CLINIC | Age: 67
End: 2022-09-07
Payer: MEDICARE

## 2022-09-07 VITALS
DIASTOLIC BLOOD PRESSURE: 84 MMHG | SYSTOLIC BLOOD PRESSURE: 142 MMHG | HEIGHT: 67 IN | WEIGHT: 193 LBS | HEART RATE: 79 BPM | RESPIRATION RATE: 17 BRPM | BODY MASS INDEX: 30.29 KG/M2 | OXYGEN SATURATION: 100 % | TEMPERATURE: 97.3 F

## 2022-09-07 DIAGNOSIS — C43.71 MALIGNANT MELANOMA OF LEG, RIGHT (HCC): Primary | ICD-10-CM

## 2022-09-07 PROCEDURE — 10140 I&D HMTMA SEROMA/FLUID COLLJ: CPT | Performed by: STUDENT IN AN ORGANIZED HEALTH CARE EDUCATION/TRAINING PROGRAM

## 2022-09-07 PROCEDURE — 99024 POSTOP FOLLOW-UP VISIT: CPT | Performed by: STUDENT IN AN ORGANIZED HEALTH CARE EDUCATION/TRAINING PROGRAM

## 2022-09-07 NOTE — PROGRESS NOTES
Surgical Oncology Follow Up    [address] [facility]  [address]  [facility] [id number] [phone]    Nathalie Savage  1955  [id number]    Diagnoses and all orders for this visit:    Malignant melanoma of leg, right [facility])        Chief Complaint   Patient presents with    Follow-up       No follow-ups on file  Oncology History   Malignant melanoma of leg, right ([facility] [address] )   5/31/2022 Biopsy    Right Leg, Shave Biopsy   Melanoma extending to the deep specimen margin  Thickness: 7 0mm  Ulceration: not seen   Mitoses: 3      5/31/2022 -  Cancer Staged    Staging form: Melanoma of the Skin, AJCC 8th Edition  - Clinical stage from 5/31/2022: Stage IIB (cT4a, cN0, cM0) - Signed by Domitila Tobin MD on 8/25/2022 7/1/2022 Initial Diagnosis    Malignant melanoma of leg, right ([facility] [address] )     7/29/2022 Surgery    A  Lymph node, sentinel, #1:  One lymph node with rare metastatic melanoma cells (1/1), subcapsular location  Immunohistochemical study for MART-1, HMB45 and S100 supports the findings  B  Leg, right, knee, wide excision:  Residual melanoma, nodular type, and scar; margins free  See synoptic report  7/29/2022 -  Cancer Staged    Staging form: Melanoma of the Skin, AJCC 8th Edition  - Pathologic stage from 7/29/2022: Stage IIIC (pT4a, pN1a, cM0) - Signed by Domitila Tobin MD on 8/25/2022           Staging: T4aN1a fiona of RT knee  Treatment history: postop  Current treatment: staging P  Disease status: staging P    History of Present Illness:  Patient is post wide local excision with sentinel lymph node biopsy of a right knee melanoma  Today complains of mild suppuration at the midpoint of his wound with serous drainage  He also complains of swelling in the right groin  No fever, chills, skin changes, other concerning symptoms  He also complains of swelling in the right lower leg      Review of Systems  Complete ROS Surg Onc:   Constitutional: The patient denies new or recent history of general fatigue, no recent weight loss, no change in appetite  Eyes: No complaints of visual problems, no scleral icterus  ENT: no complaints of ear pain, no hoarseness, no difficulty swallowing,  no tinnitus and no new masses in head, oral cavity, or neck  Cardiovascular: No complaints of chest pain, no palpitations, no ankle edema  Respiratory: No complaints of shortness of breath, no cough  Gastrointestinal: No complaints of jaundice, no bloody stools, no pale stools  Genitourinary: No complaints of dysuria, no hematuria, no nocturia, no frequent urination, no urethral discharge  Musculoskeletal: No complaints of weakness, paralysis, joint stiffness or arthralgias  Integumentary: No complaints of rash, no new lesions  Neurological: No complaints of convulsions, no seizures, no dizziness  Hematologic/Lymphatic: No complaints of easy bruising  Endocrine:  No hot or cold intolerance  No polydipsia, polyphagia, or polyuria  Allergy/immunology:  No environmental allergies  No food allergies  Not immunocompromised  Skin:  No pallor or rash  No wound          Patient Active Problem List   Diagnosis    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    Hyperbilirubinemia    Symptomatic anemia    Pulmonary embolism with acute cor pulmonale (HCC)    Portal vein thrombosis    Elevated blood pressure reading without diagnosis of hypertension    Shortness of breath    Gastroesophageal reflux disease    Autoimmune hemolytic anemia    ARABELLA (acute kidney injury) ([address] )    Splenomegaly    Iron overload due to repeated red blood cell transfusions    Posttraumatic stress disorder    Essential hypertension    Glucose intolerance (impaired glucose tolerance)    Renal insufficiency    Auto immune neutropenia (HCC)    Primary osteoarthritis of left knee    Pain in joint, lower leg    Pain in left knee    COVID-19    Thrombocytosis    Primary localized osteoarthrosis of the knee, right    Hyperglycemia    Chronic bilateral low back pain with bilateral sciatica    Hypercholesterolemia    Malignant melanoma of leg, right (HCC)    Dyspnea    Acute respiratory failure with hypoxia (HCC)    Elevated serum creatinine    Elevated bilirubin    Leukocytosis     Past Medical History:   Diagnosis Date    Anemia 11/2/2016    Anxiety     Arthritis     Autoimmune hemolytic anemia ([address] )     Claustrophobia     COVID 12/2020    DVT (deep venous thrombosis) (HCC)     GERD (gastroesophageal reflux disease)     Hearing loss, right     Hemolytic anemia ([address] )     History of transfusion     2018 - no adverse reaction    Hypertension     Malignant melanoma of leg, right ([address] ) 7/1/2022    Palpitation     Portal vein thrombosis     PTSD (post-traumatic stress disorder)     Pulmonary emboli (HCC)     Tobacco abuse      Past Surgical History:   Procedure Laterality Date    CATARACT EXTRACTION Bilateral     CHOLECYSTECTOMY  7/18/2017    COLONOSCOPY      ELBOW ARTHROPLASTY Left     bursectomy    JOINT REPLACEMENT Right 2/2/2021    knee    KNEE SURGERY Right     meniscus tear    LYMPH NODE BIOPSY Right 7/29/2022    Procedure: BIOPSY LYMPH NODE SENTINEL;  Surgeon: Lluvia Barnard MD;  [facility];  Service: Surgical Oncology    IA LAP,CHOLECYSTECTOMY/GRAPH N/A 12/23/2017    Procedure: CHOLECYSTECTOMY LAPAROSCOPIC with cholangiogram;  Surgeon: Nelly Carrillo MD;  [facility];  Service: General    IA REMOVAL SPLEEN, TOTAL N/A 5/18/2017    Procedure: LAPAROSCOPIC HAND ASSIST SPLENECTOMY;  Surgeon: Rafael Fleischer, MD;  [facility];  Service: Surgical Oncology    IA TOTAL KNEE ARTHROPLASTY Right 2/2/2021    Procedure: ARTHROPLASTY KNEE TOTAL;  Surgeon: Stephanie Spencer MD;  [facility];  Service: Orthopedics    IA TOTAL KNEE ARTHROPLASTY Left 11/17/2021    Procedure: TOTAL KNEE REPLACEMENT;  Surgeon: Aung Ely Nikita Lopes MD;  [facility];  Service: Orthopedics    SHOULDER SURGERY Left     rotator cuff x4, reconstruction    SKIN LESION EXCISION Right 7/29/2022    Procedure: WIDE EXCISION RIGHT MEDIAL THIGH;  Surgeon: Wilfredo Layne MD;  Location:  MAIN OR;  Service: Surgical Oncology     Family History   Problem Relation Age of Onset   [facility] Cancer Mother     Breast cancer Mother     Other Father         CABG    Heart attack Paternal Grandfather         acute MI     Social History     Socioeconomic History    Marital status: /Civil Union     Spouse name: Not on file    Number of children: Not on file    Years of education: Not on file    Highest education level: Not on file   Occupational History    Not on file   Tobacco Use    Smoking status: Current Some Day Smoker     Packs/day: 0 00     Years: 5 00     Pack years: 0 00     Types: Cigars    Smokeless tobacco: Current User     Types: Snuff    Tobacco comment: 5  a week average   Vaping Use    Vaping Use: Never used   Substance and Sexual Activity    Alcohol use: Yes     Alcohol/week: 6 0 standard drinks     Types: 6 Cans of beer per week     Comment: socially    Drug use: Yes     Frequency: 3 0 times per week     Types: Marijuana     Comment: medical marijuana    Sexual activity: Yes     Partners: Female     Birth control/protection: None   Other Topics Concern    Not on file   Social History Narrative    Daily coffee consumption (2 cups/day)    reitred     Social Determinants of Health     Financial Resource Strain: Not on file   Food Insecurity: No Food Insecurity    Worried About Running Out of Food in the Last Year: Never true    Michelle of Food in the Last Year: Never true   Transportation Needs: No Transportation Needs    Lack of Transportation (Medical): No    Lack of Transportation (Non-Medical):  No   Physical Activity: Not on file   Stress: Not on file   Social Connections: Not on file   Intimate Partner Violence: Not on file Housing Stability: Low Risk     Unable to Pay for Housing in the Last Year: No    Number of Places Lived in the Last Year: 1    Unstable Housing in the Last Year: No       Current Outpatient Medications:     acetaminophen (TYLENOL) 325 mg tablet, Take 2 tablets (650 mg total) by mouth every 6 (six) hours as needed for mild pain, Disp:  , Rfl: 0    benzonatate (TESSALON PERLES) 100 mg capsule, Take 1 capsule (100 mg total) by mouth 3 (three) times a day, Disp: 20 capsule, Rfl: 0    dabigatran etexilate (PRADAXA) 150 mg capsu, Take 1 capsule (150 mg total) by mouth every 12 (twelve) hours, Disp: 60 capsule, Rfl: 0    furosemide (LASIX) 20 mg tablet, Take 1 tablet (20 mg total) by mouth daily, Disp: 5 tablet, Rfl: 0    hydrochlorothiazide (HYDRODIURIL) 25 mg tablet, Take 1 tablet (25 mg total) by mouth daily (Patient taking differently: Take 25 mg by mouth every morning), Disp: 30 tablet, Rfl: 5    metoprolol succinate (TOPROL-XL) 25 mg 24 hr tablet, Take 0 5 tablets (12 5 mg total) by mouth daily (Patient taking differently: Take 12 5 mg by mouth every morning), Disp: 30 tablet, Rfl: 5    pantoprazole (PROTONIX) 40 mg tablet, Take 1 tablet (40 mg total) by mouth daily (Patient taking differently: Take 40 mg by mouth every morning), Disp: 90 tablet, Rfl: 3    potassium chloride (K-DUR,KLOR-CON) 20 mEq tablet, Take 1 tablet (20 mEq total) by mouth daily (Patient taking differently: Take 20 mEq by mouth every morning), Disp: 30 tablet, Rfl: 3    prazosin (MINIPRESS) 1 mg capsule, Take 1 mg by mouth daily at bedtime , Disp: , Rfl:     predniSONE 20 mg tablet, Take 2 tablets (40 mg total) by mouth daily, Disp: 60 tablet, Rfl: 0    sulfamethoxazole-trimethoprim (BACTRIM DS) 800-160 mg per tablet, Take 1 tablet by mouth 3 (three) times a week Monday, Wednesday and Friday, Disp: 12 tablet, Rfl: 0    traMADol (Ultram) 50 mg tablet, Take 1 tablet (50 mg total) by mouth every 6 (six) hours as needed for moderate pain, Disp: 10 tablet, Rfl: 0    VITAMIN D PO, Take 1,000 Units by mouth daily , Disp: , Rfl:     ascorbic acid (VITAMIN C) 1000 MG tablet, Take 1 tablet (1,000 mg total) by mouth every 12 (twelve) hours for 6 doses (Patient taking differently: Take 1,000 mg by mouth daily Every other day), Disp: 6 tablet, Rfl: 0  No Known Allergies  Vitals:    09/07/22 1128   BP: 142/84   Pulse: 79   Resp: 17   Temp: (!) 97 3 °F (36 3 °C)   SpO2: 100%       Physical Exam  Constitutional: Patient is well-developed and well-nourished who appears the stated age in no acute distress  Patient is pleasant and talkative  HEENT:  Normocephalic  Sclerae are anicteric  Mucous membranes are moist  Neck is supple without adenopathy  No JVD  Chest: Equal chest rise, easy WOB  Cardiac: Heart is regular rate  Abdomen: Abdomen is non-tender, non-distended and without masses  Extremities: There is no clubbing or cyanosis  RLL edema 1+  Neuro: Grossly nonfocal  Gait is normal      Lymphatic: No evidence of cervical adenopathy bilaterally  No evidence of axillary adenopathy bilaterally  No evidence of inguinal adenopathy bilaterally  RT groin seroma evident  Skin: Warm, anicteric  Knee incision with 2 cm defect and fibrinous exudate  Psych:  Patient is pleasant and talkative      Pathology:  MELANOMA OF THE SKIN: Excision, Re-Excision  8th Edition - Protocol posted: 11/10/2021  MELANOMA OF THE SKIN: EXCISION, RE-EXCISION  - All Specimens  SPECIMEN   Procedure  Excision    Specimen Laterality  Right    TUMOR   Tumor Site  Skin of lower limb and hip: right knee         Histologic Type  Nodular melanoma    Maximum Tumor (Breslow) Thickness (Millimeters)  8 mm   Macroscopic Satellite Nodule(s)  Not identified    Ulceration  Not identified    Anatomic (Allen) Level  V (Melanoma invades subcutis)    Mitotic Rate  4 mitoses per mm2   Microsatellite(s)  Not identified    Lymphovascular Invasion  Not identified    Neurotropism  Not identified    Tumor-Infiltrating Lymphocytes  Present, nonbrisk    MARGINS     Margin Status for Invasive Melanoma  All margins negative for invasive melanoma    Margin Status for Melanoma in situ  All margins negative for melanoma in situ    REGIONAL LYMPH NODES     Regional Lymph Node Status  Tumor present in regional lymph node(s)    Total Number of Lymph Nodes with Tumor  1    Number of [place] Lymph Nodes with Tumor  1    Size of Largest [place] Node Metastatic Deposit  Rare melanoma cells identified on Melan-A and HMB45 stains      Size of Largest Junior Metastatic Deposit  Rare melanoma cells identified on Melan-A and HMB45 stains      Extranodal Extension  Not identified    Matted Nodes  Not identified    Total Number of Lymph Nodes Examined  1    Number of [place] Nodes Examined  1    PATHOLOGIC STAGE CLASSIFICATION (pTNM, AJCC 8th Edition)  Classification assigned in this report includes information from a prior procedure: See previous biopsy report ([id number])    pT Category  pT4a    pN Category  pN1a      Labs:  Reviewed in Epic personally    Imaging  XR chest portable    Result Date: 7/31/2022  Narrative: CHEST INDICATION:   Hypoxemia  COMPARISON:  7/29/2022 EXAM PERFORMED/VIEWS:  XR CHEST PORTABLE FINDINGS: Cardiomediastinal silhouette appears unremarkable  The lungs are clear  No pneumothorax or pleural effusion  Osseous structures appear within normal limits for patient age  Impression: No acute cardiopulmonary disease  Workstation performed: [id number]     XR chest portable    Result Date: 7/29/2022  Narrative: CHEST INDICATION:   Shortness of breath  COMPARISON:  12/18/2020  EXAM PERFORMED/VIEWS:  XR CHEST PORTABLE FINDINGS:  Monitoring leads and clips project over the chest  Cardiomediastinal silhouette appears stable  The lungs are clear  No pneumothorax or pleural effusion  Degenerative changes  Left upper quadrant embolization coils       Impression: No acute cardiopulmonary disease  Workstation performed: [id number]     NM lymphatic melanoma    Result Date: 7/29/2022  Narrative: SENTINEL NODE LYMPHOSCINTIGRAPHY INDICATION:   Lower extremity melanoma  FINDINGS: 0 55 mCi Tc-99m sulfur colloid (0 6 cc volume) was administered intradermally in divided doses by Dr Geneva Chavez in the region of the patients right lower extremity melanoma  Scintigraphic images were obtained over the right groin and right lower extremity in multiple projections  Single node is identified within the right groin Using scintigraphic guidance, the corresponding skin site was marked with an indelible marker  The patient was transferred to the operating room in satisfactory condition  Impression: 1  [place] lymph node localized to right inguinal region  Workstation performed: [id number]     CTA chest pe study    Result Date: 7/31/2022  Narrative: CTA - CHEST WITH IV CONTRAST - PULMONARY ANGIOGRAM INDICATION:   tachypnea, tachycardia, R/O PE  COMPARISON: 4/30/2022  TECHNIQUE: CTA examination of the chest was performed using angiographic technique according to a protocol specifically tailored to evaluate for pulmonary embolism  Axial, sagittal, and coronal 2D reformatted images were created from the source data and  submitted for interpretation  In addition, coronal 3D MIP postprocessing was performed on the acquisition scanner  Radiation dose length product (DLP) for this visit:  572 mGy-cm   This examination, like all CT scans performed in the [facility], was performed utilizing techniques to minimize radiation dose exposure, including the use of iterative reconstruction and automated exposure control  IV Contrast:  70 mL of iohexol (OMNIPAQUE)  FINDINGS: PULMONARY ARTERIAL TREE:  Scattered segmental/subsegmental pulmonary emboli are seen throughout the lung    LUNGS:  Scattered groundglass opacities suggests a small vessel or small airways process process    There is no tracheal or endobronchial lesion  PLEURA:  Unremarkable  HEART/GREAT VESSELS:  RV LV ratio is greater than 0 9 concerning for right ventricular strain    No thoracic aortic aneurysm  MEDIASTINUM AND BRENDA:  Unremarkable  CHEST WALL AND LOWER NECK:   Unremarkable  VISUALIZED STRUCTURES IN THE UPPER ABDOMEN:  Unremarkable  OSSEOUS STRUCTURES:  No acute fracture or destructive osseous lesion  Impression: Right middle lobe pulmonary embolism are noted    RV LV ratio is greater than 0 9 concerning for right ventricular strain     I personally discussed this study with Magdalena Troy on 7/31/2022 at 2:17 AM  Workstation performed: [id number]     VAS lower limb venous duplex study, complete bilateral    Result Date: 8/1/2022  Narrative:  [facility] REPORT CLINICAL: Indications: PT C/O PE  He is currently receiving IV heparin  Physician wants to rule out B/L LE DVT  Operative History: No prior heart or vascular surgery Risk Factors The patient has history of HTN, current smoking, GERD, autoimmune hemolytic anemia, malignant melanoma right leg, COVID 19 virus (12/20), PE, portal vein thrombus, PTSD, and DVT  Height:  67 inches  Weight:  192 lbs  CONCLUSION:  Impression:  RIGHT LOWER LIMB: Based on color flow imaging, evaluation shows no evidence of acute deep vein thrombosis  The common femoral artery and sapheno-femoral junction were not evaluated secondary to lymph node removal at groin/pain  Tech note: A non-vascular structure measuring 3 47 cm was identified at the groin at the lymph node removal site  No evidence of superficial thrombophlebitis noted  Doppler evaluation shows a normal response to augmentation maneuvers  Popliteal, posterior tibial, and anterior tibial arterial Doppler waveforms are triphasic  LEFT LOWER LIMB: No evidence of acute or chronic deep vein thrombosis  No evidence of superficial thrombophlebitis noted  Doppler evaluation shows a normal response to augmentation maneuvers   Popliteal, posterior tibial, and anterior tibial arterial Doppler waveforms are triphasic  Technical findings were posted on EPIC  SIGNATURE: Electronically Signed by: Mau English on 2022-08-01 09:28:42 AM    US bedside procedure    Result Date: 7/31/2022  Narrative: 1 2 840 [id number] 2 323 [id number] 2    Echo complete w/ contrast if indicated    Result Date: 7/31/2022  Narrative: Mamadou Wong  Left Ventricle: Left ventricular cavity size is normal  Wall thickness is mildly increased  The left ventricular ejection fraction is 70%  Systolic function is hyperdynamic  Wall motion is normal    Right Ventricle: Right ventricular cavity size is dilated  Systolic function is mildly reduced    Tricuspid Valve: There is moderate to severe regurgitation  The right ventricular systolic pressure is severely elevated  The estimated right ventricular systolic pressure is 80 mmHg  I reviewed the above laboratory and imaging data  Discussion/Summary: Pt is s/p WLE + SLN for T4aN1a melanoma of right knee  Today a seroma was drained in the right groin  The incision was debrided and silver nitrate applied  Patient encouraged to use compression at the right lower leg as previously described  The patient will follow-up as previously scheduled  Incision and drain    Date/Time: 9/7/2022 1:06 PM  Performed by: Ramy Tello MD  Authorized by: Ramy Tello MD     Patient location:  Bedside  Location:     Type:  Seroma    Location:  Lower extremity    Lower extremity location:  R leg  Procedure details:     Complexity:  Simple    Needle aspiration: yes      Needle size:  18 G    Incision types:  Single straight    Aspiration type: puncture aspiration      Incision depth:  Subcutaneous    Drainage:  Serous    Drainage amount: Moderate  Post-procedure details:     Patient tolerance of procedure:   Tolerated well, no immediate complications  Comments:      50 mL serous fluid drained

## 2022-09-07 NOTE — H&P (VIEW-ONLY)
Surgical Oncology Follow Up    [address] [facility] [place]  [address] [facility] [place] [address] [phone]    Meaghan Villavicencio  1955  [id number]    Diagnoses and all orders for this visit:    Malignant melanoma of leg, right [facility])        Chief Complaint   Patient presents with    Follow-up       No follow-ups on file  Oncology History   Malignant melanoma of leg, right ([facility] [address] )   5/31/2022 Biopsy    Right Leg, Shave Biopsy   Melanoma extending to the deep specimen margin  Thickness: 7 0mm  Ulceration: not seen   Mitoses: 3      5/31/2022 -  Cancer Staged    Staging form: Melanoma of the Skin, AJCC 8th Edition  - Clinical stage from 5/31/2022: Stage IIB (cT4a, cN0, cM0) - Signed by Haley Bernal MD on 8/25/2022 7/1/2022 Initial Diagnosis    Malignant melanoma of leg, right ([facility] [address] )     7/29/2022 Surgery    A  Lymph node, sentinel, #1:  One lymph node with rare metastatic melanoma cells (1/1), subcapsular location  Immunohistochemical study for MART-1, HMB45 and S100 supports the findings  B  Leg, right, knee, wide excision:  Residual melanoma, nodular type, and scar; margins free  See synoptic report  7/29/2022 -  Cancer Staged    Staging form: Melanoma of the Skin, AJCC 8th Edition  - Pathologic stage from 7/29/2022: Stage IIIC (pT4a, pN1a, cM0) - Signed by Haley Bernal MD on 8/25/2022           Staging: T4aN1a fiona of RT knee  Treatment history: postop  Current treatment: staging P  Disease status: staging P    History of Present Illness:  Patient is post wide local excision with sentinel lymph node biopsy of a right knee melanoma  Today complains of mild suppuration at the midpoint of his wound with serous drainage  He also complains of swelling in the right groin  No fever, chills, skin changes, other concerning symptoms  He also complains of swelling in the right lower leg      Review of Systems  Complete ROS Surg Onc:   Constitutional: The patient denies new or recent history of general fatigue, no recent weight loss, no change in appetite  Eyes: No complaints of visual problems, no scleral icterus  ENT: no complaints of ear pain, no hoarseness, no difficulty swallowing,  no tinnitus and no new masses in head, oral cavity, or neck  Cardiovascular: No complaints of chest pain, no palpitations, no ankle edema  Respiratory: No complaints of shortness of breath, no cough  Gastrointestinal: No complaints of jaundice, no bloody stools, no pale stools  Genitourinary: No complaints of dysuria, no hematuria, no nocturia, no frequent urination, no urethral discharge  Musculoskeletal: No complaints of weakness, paralysis, joint stiffness or arthralgias  Integumentary: No complaints of rash, no new lesions  Neurological: No complaints of convulsions, no seizures, no dizziness  Hematologic/Lymphatic: No complaints of easy bruising  Endocrine:  No hot or cold intolerance  No polydipsia, polyphagia, or polyuria  Allergy/immunology:  No environmental allergies  No food allergies  Not immunocompromised  Skin:  No pallor or rash  No wound          Patient Active Problem List   Diagnosis    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    Hyperbilirubinemia    Symptomatic anemia    Pulmonary embolism with acute cor pulmonale (HCC)    Portal vein thrombosis    Elevated blood pressure reading without diagnosis of hypertension    Shortness of breath    Gastroesophageal reflux disease    Autoimmune hemolytic anemia    ARABELLA (acute kidney injury) ([address] )    Splenomegaly    Iron overload due to repeated red blood cell transfusions    Posttraumatic stress disorder    Essential hypertension    Glucose intolerance (impaired glucose tolerance)    Renal insufficiency    Auto immune neutropenia (HCC)    Primary osteoarthritis of left knee    Pain in joint, lower leg    Pain in left knee    COVID-19    Thrombocytosis    Primary localized osteoarthrosis of the knee, right    Hyperglycemia    Chronic bilateral low back pain with bilateral sciatica    Hypercholesterolemia    Malignant melanoma of leg, right (HCC)    Dyspnea    Acute respiratory failure with hypoxia (HCC)    Elevated serum creatinine    Elevated bilirubin    Leukocytosis     Past Medical History:   Diagnosis Date    Anemia 11/2/2016    Anxiety     Arthritis     Autoimmune hemolytic anemia ([address] )     Claustrophobia     COVID 12/2020    DVT (deep venous thrombosis) (HCC)     GERD (gastroesophageal reflux disease)     Hearing loss, right     Hemolytic anemia ([address] )     History of transfusion     2018 - no adverse reaction    Hypertension     Malignant melanoma of leg, right ([address] ) 7/1/2022    Palpitation     Portal vein thrombosis     PTSD (post-traumatic stress disorder)     Pulmonary emboli (HCC)     Tobacco abuse      Past Surgical History:   Procedure Laterality Date    CATARACT EXTRACTION Bilateral     CHOLECYSTECTOMY  7/18/2017    COLONOSCOPY      ELBOW ARTHROPLASTY Left     bursectomy    JOINT REPLACEMENT Right 2/2/2021    knee    KNEE SURGERY Right     meniscus tear    LYMPH NODE BIOPSY Right 7/29/2022    Procedure: BIOPSY LYMPH NODE SENTINEL;  Surgeon: Ani Jeffery MD;  [facility];  Service: Surgical Oncology    SD LAP,CHOLECYSTECTOMY/GRAPH N/A 12/23/2017    Procedure: CHOLECYSTECTOMY LAPAROSCOPIC with cholangiogram;  Surgeon: Mya Gaytan MD;  [facility];  Service: General    SD REMOVAL SPLEEN, TOTAL N/A 5/18/2017    Procedure: LAPAROSCOPIC HAND ASSIST SPLENECTOMY;  Surgeon: Andrea Ahumada MD;  [facility];  Service: Surgical Oncology    SD TOTAL KNEE ARTHROPLASTY Right 2/2/2021    Procedure: ARTHROPLASTY KNEE TOTAL;  Surgeon: Ty Penaloza MD;  [facility];  Service: Orthopedics    SD TOTAL KNEE ARTHROPLASTY Left 11/17/2021    Procedure: TOTAL KNEE REPLACEMENT;  Surgeon: Vera Salinas Bessie Mast MD;  [facility];  Service: Orthopedics    SHOULDER SURGERY Left     rotator cuff x4, reconstruction    SKIN LESION EXCISION Right 7/29/2022    Procedure: WIDE EXCISION RIGHT MEDIAL THIGH;  Surgeon: Karen Holliday MD;  Location:  MAIN OR;  Service: Surgical Oncology     Family History   Problem Relation Age of Onset   Trever Graham Cancer Mother     Breast cancer Mother     Other Father         CABG    Heart attack Paternal Grandfather         acute MI     Social History     Socioeconomic History    Marital status: /Civil Union     Spouse name: Not on file    Number of children: Not on file    Years of education: Not on file    Highest education level: Not on file   Occupational History    Not on file   Tobacco Use    Smoking status: Current Some Day Smoker     Packs/day: 0 00     Years: 5 00     Pack years: 0 00     Types: Cigars    Smokeless tobacco: Current User     Types: Snuff    Tobacco comment: 5  a week average   Vaping Use    Vaping Use: Never used   Substance and Sexual Activity    Alcohol use: Yes     Alcohol/week: 6 0 standard drinks     Types: 6 Cans of beer per week     Comment: socially    Drug use: Yes     Frequency: 3 0 times per week     Types: Marijuana     Comment: medical marijuana    Sexual activity: Yes     Partners: Female     Birth control/protection: None   Other Topics Concern    Not on file   Social History Narrative    Daily coffee consumption (2 cups/day)    reitred     Social Determinants of Health     Financial Resource Strain: Not on file   Food Insecurity: No Food Insecurity    Worried About Running Out of Food in the Last Year: Never true    Michelle of Food in the Last Year: Never true   Transportation Needs: No Transportation Needs    Lack of Transportation (Medical): No    Lack of Transportation (Non-Medical):  No   Physical Activity: Not on file   Stress: Not on file   Social Connections: Not on file   Intimate Partner Violence: Not on file Housing Stability: Low Risk     Unable to Pay for Housing in the Last Year: No    Number of Places Lived in the Last Year: 1    Unstable Housing in the Last Year: No       Current Outpatient Medications:     acetaminophen (TYLENOL) 325 mg tablet, Take 2 tablets (650 mg total) by mouth every 6 (six) hours as needed for mild pain, Disp:  , Rfl: 0    benzonatate (TESSALON PERLES) 100 mg capsule, Take 1 capsule (100 mg total) by mouth 3 (three) times a day, Disp: 20 capsule, Rfl: 0    dabigatran etexilate (PRADAXA) 150 mg capsu, Take 1 capsule (150 mg total) by mouth every 12 (twelve) hours, Disp: 60 capsule, Rfl: 0    furosemide (LASIX) 20 mg tablet, Take 1 tablet (20 mg total) by mouth daily, Disp: 5 tablet, Rfl: 0    hydrochlorothiazide (HYDRODIURIL) 25 mg tablet, Take 1 tablet (25 mg total) by mouth daily (Patient taking differently: Take 25 mg by mouth every morning), Disp: 30 tablet, Rfl: 5    metoprolol succinate (TOPROL-XL) 25 mg 24 hr tablet, Take 0 5 tablets (12 5 mg total) by mouth daily (Patient taking differently: Take 12 5 mg by mouth every morning), Disp: 30 tablet, Rfl: 5    pantoprazole (PROTONIX) 40 mg tablet, Take 1 tablet (40 mg total) by mouth daily (Patient taking differently: Take 40 mg by mouth every morning), Disp: 90 tablet, Rfl: 3    potassium chloride (K-DUR,KLOR-CON) 20 mEq tablet, Take 1 tablet (20 mEq total) by mouth daily (Patient taking differently: Take 20 mEq by mouth every morning), Disp: 30 tablet, Rfl: 3    prazosin (MINIPRESS) 1 mg capsule, Take 1 mg by mouth daily at bedtime , Disp: , Rfl:     predniSONE 20 mg tablet, Take 2 tablets (40 mg total) by mouth daily, Disp: 60 tablet, Rfl: 0    sulfamethoxazole-trimethoprim (BACTRIM DS) 800-160 mg per tablet, Take 1 tablet by mouth 3 (three) times a week Monday, Wednesday and Friday, Disp: 12 tablet, Rfl: 0    traMADol (Ultram) 50 mg tablet, Take 1 tablet (50 mg total) by mouth every 6 (six) hours as needed for moderate pain, Disp: 10 tablet, Rfl: 0    VITAMIN D PO, Take 1,000 Units by mouth daily , Disp: , Rfl:     ascorbic acid (VITAMIN C) 1000 MG tablet, Take 1 tablet (1,000 mg total) by mouth every 12 (twelve) hours for 6 doses (Patient taking differently: Take 1,000 mg by mouth daily Every other day), Disp: 6 tablet, Rfl: 0  No Known Allergies  Vitals:    09/07/22 1128   BP: 142/84   Pulse: 79   Resp: 17   Temp: (!) 97 3 °F (36 3 °C)   SpO2: 100%       Physical Exam  Constitutional: Patient is well-developed and well-nourished who appears the stated age in no acute distress  Patient is pleasant and talkative  HEENT:  Normocephalic  Sclerae are anicteric  Mucous membranes are moist  Neck is supple without adenopathy  No JVD  Chest: Equal chest rise, easy WOB  Cardiac: Heart is regular rate  Abdomen: Abdomen is non-tender, non-distended and without masses  Extremities: There is no clubbing or cyanosis  RLL edema 1+  Neuro: Grossly nonfocal  Gait is normal      Lymphatic: No evidence of cervical adenopathy bilaterally  No evidence of axillary adenopathy bilaterally  No evidence of inguinal adenopathy bilaterally  RT groin seroma evident  Skin: Warm, anicteric  Knee incision with 2 cm defect and fibrinous exudate  Psych:  Patient is pleasant and talkative      Pathology:  MELANOMA OF THE SKIN: Excision, Re-Excision  8th Edition - Protocol posted: 11/10/2021  MELANOMA OF THE SKIN: EXCISION, RE-EXCISION  - All Specimens  SPECIMEN   Procedure  Excision    Specimen Laterality  Right    TUMOR   Tumor Site  Skin of lower limb and hip: right knee         Histologic Type  Nodular melanoma    Maximum Tumor (Breslow) Thickness (Millimeters)  8 mm   Macroscopic Satellite Nodule(s)  Not identified    Ulceration  Not identified    Anatomic (Allen) Level  V (Melanoma invades subcutis)    Mitotic Rate  4 mitoses per mm2   Microsatellite(s)  Not identified    Lymphovascular Invasion  Not identified    Neurotropism  Not identified    Tumor-Infiltrating Lymphocytes  Present, nonbrisk    MARGINS     Margin Status for Invasive Melanoma  All margins negative for invasive melanoma    Margin Status for Melanoma in situ  All margins negative for melanoma in situ    REGIONAL LYMPH NODES     Regional Lymph Node Status  Tumor present in regional lymph node(s)    Total Number of Lymph Nodes with Tumor  1    Number of Bowden Lymph Nodes with Tumor  1    Size of Largest Bowden Node Metastatic Deposit  Rare melanoma cells identified on Melan-A and HMB45 stains      Size of Largest Junior Metastatic Deposit  Rare melanoma cells identified on Melan-A and HMB45 stains      Extranodal Extension  Not identified    Matted Nodes  Not identified    Total Number of Lymph Nodes Examined  1    Number of Bowden Nodes Examined  1    PATHOLOGIC STAGE CLASSIFICATION (pTNM, AJCC 8th Edition)  Classification assigned in this report includes information from a prior procedure: See previous biopsy report ([id number])    pT Category  pT4a    pN Category  pN1a      Labs:  Reviewed in Epic personally    Imaging  XR chest portable    Result Date: 7/31/2022  Narrative: CHEST INDICATION:   Hypoxemia  COMPARISON:  7/29/2022 EXAM PERFORMED/VIEWS:  XR CHEST PORTABLE FINDINGS: Cardiomediastinal silhouette appears unremarkable  The lungs are clear  No pneumothorax or pleural effusion  Osseous structures appear within normal limits for patient age  Impression: No acute cardiopulmonary disease  Workstation performed: [id number]     XR chest portable    Result Date: 7/29/2022  Narrative: CHEST INDICATION:   Shortness of breath  COMPARISON:  12/18/2020  EXAM PERFORMED/VIEWS:  XR CHEST PORTABLE FINDINGS:  Monitoring leads and clips project over the chest  Cardiomediastinal silhouette appears stable  The lungs are clear  No pneumothorax or pleural effusion  Degenerative changes  Left upper quadrant embolization coils       Impression: No acute cardiopulmonary disease  Workstation performed: [id number]     NM lymphatic melanoma    Result Date: 7/29/2022  Narrative: SENTINEL NODE LYMPHOSCINTIGRAPHY INDICATION:   Lower extremity melanoma  FINDINGS: 0 55 mCi Tc-99m sulfur colloid (0 6 cc volume) was administered intradermally in divided doses by Dr Niki Sky in the region of the patients right lower extremity melanoma  Scintigraphic images were obtained over the right groin and right lower extremity in multiple projections  Single node is identified within the right groin Using scintigraphic guidance, the corresponding skin site was marked with an indelible marker  The patient was transferred to the operating room in satisfactory condition  Impression: 1  [place] lymph node localized to right inguinal region  Workstation performed: [id number]     CTA chest pe study    Result Date: 7/31/2022  Narrative: CTA - CHEST WITH IV CONTRAST - PULMONARY ANGIOGRAM INDICATION:   tachypnea, tachycardia, R/O PE  COMPARISON: 4/30/2022  TECHNIQUE: CTA examination of the chest was performed using angiographic technique according to a protocol specifically tailored to evaluate for pulmonary embolism  Axial, sagittal, and coronal 2D reformatted images were created from the source data and  submitted for interpretation  In addition, coronal 3D MIP postprocessing was performed on the acquisition scanner  Radiation dose length product (DLP) for this visit:  572 mGy-cm   This examination, like all CT scans performed in the [facility], was performed utilizing techniques to minimize radiation dose exposure, including the use of iterative reconstruction and automated exposure control  IV Contrast:  70 mL of iohexol (OMNIPAQUE)  FINDINGS: PULMONARY ARTERIAL TREE:  Scattered segmental/subsegmental pulmonary emboli are seen throughout the lung    LUNGS:  Scattered groundglass opacities suggests a small vessel or small airways process process    There is no tracheal or endobronchial lesion  PLEURA:  Unremarkable  HEART/GREAT VESSELS:  RV LV ratio is greater than 0 9 concerning for right ventricular strain    No thoracic aortic aneurysm  MEDIASTINUM AND BRENDA:  Unremarkable  CHEST WALL AND LOWER NECK:   Unremarkable  VISUALIZED STRUCTURES IN THE UPPER ABDOMEN:  Unremarkable  OSSEOUS STRUCTURES:  No acute fracture or destructive osseous lesion  Impression: Right middle lobe pulmonary embolism are noted    RV LV ratio is greater than 0 9 concerning for right ventricular strain     I personally discussed this study with Elias Koehler on 7/31/2022 at 2:17 AM  Workstation performed: [id number]     VAS lower limb venous duplex study, complete bilateral    Result Date: 8/1/2022  Narrative:  [facility] REPORT CLINICAL: Indications: PT C/O PE  He is currently receiving IV heparin  Physician wants to rule out B/L LE DVT  Operative History: No prior heart or vascular surgery Risk Factors The patient has history of HTN, current smoking, GERD, autoimmune hemolytic anemia, malignant melanoma right leg, COVID 19 virus (12/20), PE, portal vein thrombus, PTSD, and DVT  Height:  67 inches  Weight:  192 lbs  CONCLUSION:  Impression:  RIGHT LOWER LIMB: Based on color flow imaging, evaluation shows no evidence of acute deep vein thrombosis  The common femoral artery and sapheno-femoral junction were not evaluated secondary to lymph node removal at groin/pain  Tech note: A non-vascular structure measuring 3 47 cm was identified at the groin at the lymph node removal site  No evidence of superficial thrombophlebitis noted  Doppler evaluation shows a normal response to augmentation maneuvers  Popliteal, posterior tibial, and anterior tibial arterial Doppler waveforms are triphasic  LEFT LOWER LIMB: No evidence of acute or chronic deep vein thrombosis  No evidence of superficial thrombophlebitis noted  Doppler evaluation shows a normal response to augmentation maneuvers   Popliteal, posterior tibial, and anterior tibial arterial Doppler waveforms are triphasic  Technical findings were posted on EPIC  SIGNATURE: Electronically Signed by: Dilcia Gooden on 2022-08-01 09:28:42 AM    US bedside procedure    Result Date: 7/31/2022  Narrative: 1 2 [id number] 2 [id number] 2    Echo complete w/ contrast if indicated    Result Date: 7/31/2022  Narrative: Belkys Carreno  Left Ventricle: Left ventricular cavity size is normal  Wall thickness is mildly increased  The left ventricular ejection fraction is 70%  Systolic function is hyperdynamic  Wall motion is normal    Right Ventricle: Right ventricular cavity size is dilated  Systolic function is mildly reduced    Tricuspid Valve: There is moderate to severe regurgitation  The right ventricular systolic pressure is severely elevated  The estimated right ventricular systolic pressure is 80 mmHg  I reviewed the above laboratory and imaging data  Discussion/Summary: Pt is s/p WLE + SLN for T4aN1a melanoma of right knee  Today a seroma was drained in the right groin  The incision was debrided and silver nitrate applied  Patient encouraged to use compression at the right lower leg as previously described  The patient will follow-up as previously scheduled  Incision and drain    Date/Time: 9/7/2022 1:06 PM  Performed by: Laurie Gutierrez MD  Authorized by: Laurie Gutierrez MD     Patient location:  Bedside  Location:     Type:  Seroma    Location:  Lower extremity    Lower extremity location:  R leg  Procedure details:     Complexity:  Simple    Needle aspiration: yes      Needle size:  18 G    Incision types:  Single straight    Aspiration type: puncture aspiration      Incision depth:  Subcutaneous    Drainage:  Serous    Drainage amount: Moderate  Post-procedure details:     Patient tolerance of procedure:   Tolerated well, no immediate complications  Comments:      50 mL serous fluid drained

## 2022-09-09 ENCOUNTER — TELEPHONE (OUTPATIENT)
Dept: HEMATOLOGY ONCOLOGY | Facility: CLINIC | Age: 67
End: 2022-09-09

## 2022-09-09 ENCOUNTER — TELEPHONE (OUTPATIENT)
Dept: SURGICAL ONCOLOGY | Facility: CLINIC | Age: 67
End: 2022-09-09

## 2022-09-09 NOTE — TELEPHONE ENCOUNTER
Tc to pt and spoke with him regarding medication to take prior to MRI  Informed him that Dr Sonya Cline sent a prescription of xanax over to his pharmacy to take prior to his MRI  Pt verbalized understanding and appreciative of call

## 2022-09-09 NOTE — TELEPHONE ENCOUNTER
CALL RETURN FORM   Reason for patient call? Patient has a brain MRI scheduled 09/14 and is requesting a prescription for Xanax be sent in for him  He states without this, he will be unable to have the MRI  Patient's primary oncologist?  [facility]   Name of person the patient was calling for?  [facility]   Any additional information to add, if applicable? Patient would like the prescription sent to [facility] in [place], [place]   Informed patient that the message will be forwarded to the team and someone will get back to them as soon as possible    Did you relay this information to the patient?  Yes

## 2022-09-13 ENCOUNTER — TELEPHONE (OUTPATIENT)
Dept: HEMATOLOGY ONCOLOGY | Facility: CLINIC | Age: 67
End: 2022-09-13

## 2022-09-15 ENCOUNTER — TELEPHONE (OUTPATIENT)
Dept: HEMATOLOGY ONCOLOGY | Facility: CLINIC | Age: 67
End: 2022-09-15

## 2022-09-15 ENCOUNTER — HOSPITAL ENCOUNTER (OUTPATIENT)
Dept: RADIOLOGY | Facility: HOSPITAL | Age: 67
Discharge: HOME | End: 2022-09-15
Attending: STUDENT IN AN ORGANIZED HEALTH CARE EDUCATION/TRAINING PROGRAM
Payer: MEDICARE

## 2022-09-15 ENCOUNTER — PREP FOR PROCEDURE (OUTPATIENT)
Dept: INTERVENTIONAL RADIOLOGY/VASCULAR | Facility: CLINIC | Age: 67
End: 2022-09-15

## 2022-09-15 DIAGNOSIS — C43.71 MALIGNANT MELANOMA OF LEG, RIGHT (HCC): Primary | ICD-10-CM

## 2022-09-15 DIAGNOSIS — C43.71 MALIGNANT MELANOMA OF LEG, RIGHT (HCC): ICD-10-CM

## 2022-09-15 PROCEDURE — G1004 CDSM NDSC: HCPCS

## 2022-09-15 PROCEDURE — 70553 MRI BRAIN STEM W/O & W/DYE: CPT

## 2022-09-15 PROCEDURE — A9585 GADOBUTROL INJECTION: HCPCS | Performed by: STUDENT IN AN ORGANIZED HEALTH CARE EDUCATION/TRAINING PROGRAM

## 2022-09-15 RX ADMIN — GADOBUTROL 8 ML: 604.72 INJECTION INTRAVENOUS at 18:21

## 2022-09-15 NOTE — TELEPHONE ENCOUNTER
Discussed with Dr John Munguia  Order placed for IR for a seroma drain to be placed  Tc to pt to notify that an order has been placed for IR to place a drain in his right groin for the recurrent seroma  Pt verbalized understanding  Explained that since the seroma has returned so quickly that it would be best to be evaluated by IR for a drain  Instructed that IR would be reaching out to him to scheduled him for an appt  Pt states that the swelling in his leg is improving and his melanoma incision is healing  He does note that the seroma in his groin is more swollen than before  Encouraged pt to use warm compresses until seen by IR  Pt verbalized understanding and appreciative of call

## 2022-09-15 NOTE — TELEPHONE ENCOUNTER
CALL RETURN FORM   Reason for patient call? Patient is calling in to inform that he has fluid in his groin area which appears to be more than before  Patient wants to know whether he needs to come in to have it drained   Patient's primary oncologist?  [facility]    Name of person the patient was calling for?  Grace    Any additional information to add, if applicable? n/a   Informed patient that the message will be forwarded to the team and someone will get back to them as soon as possible    Did you relay this information to the patient?  yes, Estrella Joseph can be reached back at [phone]

## 2022-09-16 ENCOUNTER — APPOINTMENT (OUTPATIENT)
Dept: LAB | Facility: MEDICAL CENTER | Age: 67
End: 2022-09-16
Payer: MEDICARE

## 2022-09-16 DIAGNOSIS — C43.71 MALIGNANT MELANOMA OF RIGHT LOWER EXTREMITY INCLUDING HIP (HCC): ICD-10-CM

## 2022-09-16 LAB
BASOPHILS # BLD AUTO: 0.07 THOUSANDS/ΜL (ref 0–0.1)
BASOPHILS NFR BLD AUTO: 1 % (ref 0–1)
EOSINOPHIL # BLD AUTO: 0.05 THOUSAND/ΜL (ref 0–0.61)
EOSINOPHIL NFR BLD AUTO: 1 % (ref 0–6)
ERYTHROCYTE [DISTWIDTH] IN BLOOD BY AUTOMATED COUNT: 14.6 % (ref 11.6–15.1)
HCT VFR BLD AUTO: 40.2 % (ref 36.5–49.3)
HGB BLD-MCNC: 13.3 G/DL (ref 12–17)
IMM GRANULOCYTES # BLD AUTO: 0.04 THOUSAND/UL (ref 0–0.2)
IMM GRANULOCYTES NFR BLD AUTO: 0 % (ref 0–2)
LYMPHOCYTES # BLD AUTO: 4.32 THOUSANDS/ΜL (ref 0.6–4.47)
LYMPHOCYTES NFR BLD AUTO: 40 % (ref 14–44)
MCH RBC QN AUTO: 39.2 PG (ref 26.8–34.3)
MCHC RBC AUTO-ENTMCNC: 33.1 G/DL (ref 31.4–37.4)
MCV RBC AUTO: 119 FL (ref 82–98)
MONOCYTES # BLD AUTO: 1.24 THOUSAND/ΜL (ref 0.17–1.22)
MONOCYTES NFR BLD AUTO: 11 % (ref 4–12)
NEUTROPHILS # BLD AUTO: 5.23 THOUSANDS/ΜL (ref 1.85–7.62)
NEUTS SEG NFR BLD AUTO: 47 % (ref 43–75)
NRBC BLD AUTO-RTO: 0 /100 WBCS
PLATELET # BLD AUTO: 552 THOUSANDS/UL (ref 149–390)
PMV BLD AUTO: 9.9 FL (ref 8.9–12.7)
RBC # BLD AUTO: 3.39 MILLION/UL (ref 3.88–5.62)
WBC # BLD AUTO: 10.95 THOUSAND/UL (ref 4.31–10.16)

## 2022-09-16 PROCEDURE — 85025 COMPLETE CBC W/AUTO DIFF WBC: CPT

## 2022-09-16 PROCEDURE — 36415 COLL VENOUS BLD VENIPUNCTURE: CPT

## 2022-09-19 ENCOUNTER — OFFICE VISIT (OUTPATIENT)
Dept: HEMATOLOGY ONCOLOGY | Facility: CLINIC | Age: 67
End: 2022-09-19
Payer: MEDICARE

## 2022-09-19 ENCOUNTER — HOSPITAL ENCOUNTER (OUTPATIENT)
Dept: RADIOLOGY | Facility: HOSPITAL | Age: 67
Discharge: HOME | End: 2022-09-19
Attending: STUDENT IN AN ORGANIZED HEALTH CARE EDUCATION/TRAINING PROGRAM | Admitting: RADIOLOGY
Payer: MEDICARE

## 2022-09-19 VITALS
BODY MASS INDEX: 30.45 KG/M2 | HEIGHT: 67 IN | RESPIRATION RATE: 17 BRPM | TEMPERATURE: 97.3 F | SYSTOLIC BLOOD PRESSURE: 142 MMHG | WEIGHT: 194 LBS | HEART RATE: 89 BPM | DIASTOLIC BLOOD PRESSURE: 82 MMHG | OXYGEN SATURATION: 97 %

## 2022-09-19 DIAGNOSIS — C43.71 MALIGNANT MELANOMA OF LEG, RIGHT (HCC): Primary | ICD-10-CM

## 2022-09-19 DIAGNOSIS — D59.11 HEMOLYTIC ANEMIA DUE TO WARM ANTIBODY (HCC): ICD-10-CM

## 2022-09-19 DIAGNOSIS — C43.71 MALIGNANT MELANOMA OF LEG, RIGHT (HCC): ICD-10-CM

## 2022-09-19 PROCEDURE — 87075 CULTR BACTERIA EXCEPT BLOOD: CPT | Performed by: STUDENT IN AN ORGANIZED HEALTH CARE EDUCATION/TRAINING PROGRAM

## 2022-09-19 PROCEDURE — 88341 IMHCHEM/IMCYTCHM EA ADD ANTB: CPT | Performed by: SPECIALIST

## 2022-09-19 PROCEDURE — C1769 GUIDE WIRE: HCPCS

## 2022-09-19 PROCEDURE — 10030 IMG GID FLU COLL DRG SFT TIS: CPT | Performed by: RADIOLOGY

## 2022-09-19 PROCEDURE — 88305 TISSUE EXAM BY PATHOLOGIST: CPT | Performed by: SPECIALIST

## 2022-09-19 PROCEDURE — 88342 IMHCHEM/IMCYTCHM 1ST ANTB: CPT | Performed by: SPECIALIST

## 2022-09-19 PROCEDURE — 88112 CYTOPATH CELL ENHANCE TECH: CPT | Performed by: SPECIALIST

## 2022-09-19 PROCEDURE — 10030 IMG GID FLU COLL DRG SFT TIS: CPT

## 2022-09-19 PROCEDURE — 87205 SMEAR GRAM STAIN: CPT | Performed by: STUDENT IN AN ORGANIZED HEALTH CARE EDUCATION/TRAINING PROGRAM

## 2022-09-19 PROCEDURE — C1729 CATH, DRAINAGE: HCPCS

## 2022-09-19 PROCEDURE — 87070 CULTURE OTHR SPECIMN AEROBIC: CPT | Performed by: STUDENT IN AN ORGANIZED HEALTH CARE EDUCATION/TRAINING PROGRAM

## 2022-09-19 PROCEDURE — 87186 SC STD MICRODIL/AGAR DIL: CPT | Performed by: STUDENT IN AN ORGANIZED HEALTH CARE EDUCATION/TRAINING PROGRAM

## 2022-09-19 PROCEDURE — 99214 OFFICE O/P EST MOD 30 MIN: CPT | Performed by: INTERNAL MEDICINE

## 2022-09-19 PROCEDURE — 87077 CULTURE AEROBIC IDENTIFY: CPT | Performed by: STUDENT IN AN ORGANIZED HEALTH CARE EDUCATION/TRAINING PROGRAM

## 2022-09-19 RX ORDER — SODIUM CHLORIDE 9 MG/ML
10 INJECTION INTRAVENOUS DAILY
Qty: 300 ML | Refills: 0 | Status: SHIPPED | OUTPATIENT
Start: 2022-09-19 | End: 2022-12-18

## 2022-09-19 NOTE — LETTER
September 25, 2022     Wendy Castellon, [address] [place] [address]    Patient: Maile Chatterjee   YOB: 1955   Date of Visit: 9/19/2022       Dear Dr Virginia Ferguson: Thank you for referring Amara Dumont to me for evaluation  Below are my notes for this consultation  If you have questions, please do not hesitate to call me  I look forward to following your patient along with you  Sincerely,        Rosendo Juan MD        CC: DO Kapil De Leon, MD Rosendo Javed MD  9/25/2022  3:00 PM  Sign when Signing Visit  [address] [place] [address]  [phone]     Date of Visit: 9/19/2022  Name: Maile Chatterjee   YOB: 1955        Subjective    VISIT DIAGNOSIS:  Diagnoses and all orders for this visit:    Malignant melanoma of leg, right ([facility] [address] )    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        Oncology History   Malignant melanoma of leg, right ([facility] [address] )   5/31/2022 Biopsy    Right Leg, Shave Biopsy   Melanoma extending to the deep specimen margin  Thickness: 7 0mm  Ulceration: not seen   Mitoses: 3      5/31/2022 -  Cancer Staged    Staging form: Melanoma of the Skin, AJCC 8th Edition  - Clinical stage from 5/31/2022: Stage IIB (cT4a, cN0, cM0) - Signed by Rosendo Juan MD on 8/25/2022 7/1/2022 Initial Diagnosis    Malignant melanoma of leg, right ([facility] [address] )     7/29/2022 Surgery    A  Lymph node, sentinel, #1:  One lymph node with rare metastatic melanoma cells (1/1), subcapsular location  Immunohistochemical study for MART-1, HMB45 and S100 supports the findings  B  Leg, right, knee, wide excision:  Residual melanoma, nodular type, and scar; margins free  See synoptic report       7/29/2022 -  Cancer Staged    Staging form: Melanoma of the Skin, AJCC 8th Edition  - Pathologic stage from 7/29/2022: Stage IIIC (pT4a, pN1a, cM0) - Signed by Donna Griffin [facility], MD on 8/25/2022          Cancer Staging  Malignant melanoma of leg, right [facility])  Staging form: Melanoma of the Skin, AJCC 8th Edition  - Clinical stage from 5/31/2022: Stage IIB (cT4a, cN0, cM0) - Signed by Isaias Russo MD on 8/25/2022  - Pathologic stage from 7/29/2022: Stage IIIC (pT4a, pN1a, cM0) - Signed by Isaias Russo MD on 8/25/2022     Treatment Details   Treatment goal [No plan goal]   Plan Name ONE-TIME BLOOD PRODUCT TRANSFUSION PLAN   Status Active   Start Date 7/15/2022   End Date Until discontinued   Provider Beryle Guthrie, CRNP   Chemotherapy [No matching medication found in this treatment plan]     Treatment Details   Treatment goal Palliative   Plan Name OP RiTUXimab weekly x 4, every 6 months   Status Active   Start Date 8/1/2022   End Date 8/23/2022   Provider Daryl Velasco DO   Chemotherapy riTUXimab (RITUXAN) subsequent titrated chemo infusion, 375 mg/m2 = 746 2 mg, Intravenous, Once, 1 of 1 cycle  Administration: 746 2 mg (8/9/2022), 746 2 mg (8/16/2022), 746 2 mg (8/23/2022)    riTUXimab (RITUXAN) first titrated chemo infusion, 375 mg/m2 = 746 2 mg, Intravenous, Once, 1 of 1 cycle  Administration: 746 2 mg (8/1/2022)          HISTORY OF PRESENT ILLNESS: Rubén Farmer is a 75 y o  male  who has Stage IIIC melanoma here for follow up  He is doing ok  Had the seroma in his right groin drained, but it has reaccumulated  He is scheduled for drain placement with IR this week  Surgical site continues to heal   Did use lasix and it improved his LE swelling a little bit, but right leg still swollen  No new, changing, or concerning lesions  No new LAD  We discussed his imaging in which the PET scan was negative for melanoma recurrence or metastasis and his brain MRI was negative for metastatic disease  REVIEW OF SYSTEMS:  Review of Systems   Constitutional: Negative for appetite change, fatigue, fever and unexpected weight change  HENT:   Negative for lump/mass  Eyes: Negative for icterus  Respiratory: Negative for cough, shortness of breath and wheezing  Cardiovascular: Positive for leg swelling (RLE)  Gastrointestinal: Negative for abdominal pain, constipation, diarrhea, nausea and vomiting  Genitourinary: Negative for difficulty urinating and hematuria  Musculoskeletal: Negative for arthralgias, gait problem and myalgias  Skin: Negative for itching and rash  No new, changing, or concerning lesions  Neurological: Negative for extremity weakness, gait problem, headaches, light-headedness and numbness  Hematological: Negative for adenopathy          MEDICATIONS:    Current Outpatient Medications:     acetaminophen (TYLENOL) 325 mg tablet, Take 2 tablets (650 mg total) by mouth every 6 (six) hours as needed for mild pain, Disp:  , Rfl: 0    ALPRAZolam (XANAX) 0 5 mg tablet, Take 1 tablet (0 5 mg total) by mouth 2 (two) times a day Take one two hours before scan and second one 30 min before, Disp: 2 tablet, Rfl: 0    benzonatate (TESSALON PERLES) 100 mg capsule, Take 1 capsule (100 mg total) by mouth 3 (three) times a day, Disp: 20 capsule, Rfl: 0    dabigatran etexilate (PRADAXA) 150 mg capsu, Take 1 capsule (150 mg total) by mouth every 12 (twelve) hours, Disp: 60 capsule, Rfl: 0    furosemide (LASIX) 20 mg tablet, Take 1 tablet (20 mg total) by mouth daily, Disp: 5 tablet, Rfl: 0    hydrochlorothiazide (HYDRODIURIL) 25 mg tablet, Take 1 tablet (25 mg total) by mouth daily (Patient taking differently: Take 25 mg by mouth every morning), Disp: 30 tablet, Rfl: 5    metoprolol succinate (TOPROL-XL) 25 mg 24 hr tablet, Take 0 5 tablets (12 5 mg total) by mouth daily (Patient taking differently: Take 12 5 mg by mouth every morning), Disp: 30 tablet, Rfl: 5    pantoprazole (PROTONIX) 40 mg tablet, Take 1 tablet (40 mg total) by mouth daily (Patient taking differently: Take 40 mg by mouth every morning), Disp: 90 tablet, Rfl: 3    potassium chloride (K-DUR,KLOR-CON) 20 mEq tablet, Take 1 tablet (20 mEq total) by mouth daily (Patient taking differently: Take 20 mEq by mouth every morning), Disp: 30 tablet, Rfl: 3    prazosin (MINIPRESS) 1 mg capsule, Take 1 mg by mouth daily at bedtime , Disp: , Rfl:     predniSONE 20 mg tablet, Take 2 tablets (40 mg total) by mouth daily, Disp: 60 tablet, Rfl: 0    sulfamethoxazole-trimethoprim (BACTRIM DS) 800-160 mg per tablet, Take 1 tablet by mouth 3 (three) times a week Monday, Wednesday and Friday, Disp: 12 tablet, Rfl: 0    traMADol (Ultram) 50 mg tablet, Take 1 tablet (50 mg total) by mouth every 6 (six) hours as needed for moderate pain, Disp: 10 tablet, Rfl: 0    VITAMIN D PO, Take 1,000 Units by mouth daily , Disp: , Rfl:     ascorbic acid (VITAMIN C) 1000 MG tablet, Take 1 tablet (1,000 mg total) by mouth every 12 (twelve) hours for 6 doses (Patient taking differently: Take 1,000 mg by mouth daily Every other day), Disp: 6 tablet, Rfl: 0    sodium chloride, PF, 0 9 %, 10 mL by Intracatheter route daily Intracatheter flushing daily   May substitute prefilled syringe with normal saline 10 mL vials, 10 mL syringes, and 18 g blunt needles, Disp: 300 mL, Rfl: 0     ALLERGIES:  No Known Allergies     ACTIVE PROBLEMS:  Patient Active Problem List   Diagnosis    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    Hyperbilirubinemia    Symptomatic anemia    Pulmonary embolism with acute cor pulmonale (HCC)    Portal vein thrombosis    Elevated blood pressure reading without diagnosis of hypertension    Shortness of breath    Gastroesophageal reflux disease    Autoimmune hemolytic anemia    ARABELLA (acute kidney injury) ([facility] [address] )    Splenomegaly    Iron overload due to repeated red blood cell transfusions    Posttraumatic stress disorder    Essential hypertension    Glucose intolerance (impaired glucose tolerance)    Renal insufficiency    Auto immune neutropenia (HCC)    Primary osteoarthritis of left knee    Pain in joint, lower leg    Pain in left knee    COVID-19    Thrombocytosis    Primary localized osteoarthrosis of the knee, right    Hyperglycemia    Chronic bilateral low back pain with bilateral sciatica    Hypercholesterolemia    Malignant melanoma of leg, right (HCC)    Dyspnea    Acute respiratory failure with hypoxia (HCC)    Elevated serum creatinine    Elevated bilirubin    Leukocytosis          PAST MEDICAL HISTORY:   Past Medical History:   Diagnosis Date    Anemia 11/2/2016    Anxiety     Arthritis     Autoimmune hemolytic anemia ([address] )     Claustrophobia     COVID 12/2020    DVT (deep venous thrombosis) (HCC)     GERD (gastroesophageal reflux disease)     Hearing loss, right     Hemolytic anemia ([address] )     History of transfusion     2018 - no adverse reaction    Hypertension     Malignant melanoma of leg, right ([address] ) 7/1/2022    Palpitation     Portal vein thrombosis     PTSD (post-traumatic stress disorder)     Pulmonary emboli (HCC)     Tobacco abuse         PAST SURGICAL HISTORY:  Past Surgical History:   Procedure Laterality Date    CATARACT EXTRACTION Bilateral     CHOLECYSTECTOMY  7/18/2017    COLONOSCOPY      ELBOW ARTHROPLASTY Left     bursectomy    IR DRAINAGE TUBE PLACEMENT  9/19/2022    JOINT REPLACEMENT Right 2/2/2021    knee    KNEE SURGERY Right     meniscus tear    LYMPH NODE BIOPSY Right 7/29/2022    Procedure: BIOPSY LYMPH NODE SENTINEL;  Surgeon: Diego Humphrey MD;  [facility];  Service: Surgical Oncology    DE LAP,CHOLECYSTECTOMY/GRAPH N/A 12/23/2017    Procedure: CHOLECYSTECTOMY LAPAROSCOPIC with cholangiogram;  Surgeon: Kvng Yañez MD;  [facility];  Service: General    DE REMOVAL SPLEEN, TOTAL N/A 5/18/2017    Procedure: LAPAROSCOPIC HAND ASSIST SPLENECTOMY;  Surgeon: Jhony Gloria MD;  [facility];  Service: Surgical Oncology    DE TOTAL KNEE ARTHROPLASTY Right 2/2/2021    Procedure: ARTHROPLASTY KNEE TOTAL;  Surgeon: Tatum Carroll MD;  [facility];  Service: Orthopedics    [place] TOTAL KNEE ARTHROPLASTY Left 11/17/2021    Procedure: TOTAL KNEE REPLACEMENT;  Surgeon: Tatum Carroll MD;  [facility];  Service: Orthopedics    SHOULDER SURGERY Left     rotator cuff x4, reconstruction    SKIN LESION EXCISION Right 7/29/2022    Procedure: WIDE EXCISION RIGHT MEDIAL THIGH;  Surgeon: Wally Hayden MD;  Location:  MAIN OR;  Service: Surgical Oncology        SOCIAL HISTORY:  Social History     Socioeconomic History    Marital status: /Civil Union     Spouse name: None    Number of children: None    Years of education: None    Highest education level: None   Occupational History    None   Tobacco Use    Smoking status: Current Some Day Smoker     Packs/day: 0 00     Years: 5 00     Pack years: 0 00     Types: Cigars    Smokeless tobacco: Current User     Types: Snuff    Tobacco comment: 5  a week average   Vaping Use    Vaping Use: Never used   Substance and Sexual Activity    Alcohol use: Yes     Alcohol/week: 6 0 standard drinks     Types: 6 Cans of beer per week     Comment: socially    Drug use: Yes     Frequency: 3 0 times per week     Types: Marijuana     Comment: medical marijuana    Sexual activity: Yes     Partners: Female     Birth control/protection: None   Other Topics Concern    None   Social History Narrative    Daily coffee consumption (2 cups/day)    reitred     Social Determinants of Health     Financial Resource Strain: Not on file   Food Insecurity: No Food Insecurity    Worried About Running Out of Food in the Last Year: Never true    Michelle of Food in the Last Year: Never true   Transportation Needs: No Transportation Needs    Lack of Transportation (Medical): No    Lack of Transportation (Non-Medical):  No   Physical Activity: Not on file   Stress: Not on file   Social Connections: Not on file   Intimate Partner Violence: Not on file   Housing Stability: Low Risk     Unable to Pay for Housing in the Last Year: No    Number of Places Lived in the Last Year: 1    Unstable Housing in the Last Year: No        FAMILY HISTORY:  Family History   Problem Relation Age of Onset   Hamzah Ramírez Cancer Mother     Breast cancer Mother     Other Father         CABG    Heart attack Paternal Grandfather         acute MI           Objective    PHYSICAL EXAMINATION:   Blood pressure 142/82, pulse 89, temperature (!) 97 3 °F (36 3 °C), resp  rate 17, height 5' 7" (1 702 m), weight 88 kg (194 lb), SpO2 97 %  Pain Score: 0-No pain     ECOG Performance Status    Flowsheet Row Most Recent Value   ECOG Performance Status 0 - Fully active, able to carry on all pre-disease performance without restriction             Physical Exam  Constitutional:       General: He is not in acute distress  Appearance: Normal appearance  He is not toxic-appearing  HENT:      Mouth/Throat:      Mouth: Mucous membranes are moist       Pharynx: Oropharynx is clear  Eyes:      General: No scleral icterus  Cardiovascular:      Rate and Rhythm: Normal rate and regular rhythm  Pulses: Normal pulses  Heart sounds: No murmur heard  No friction rub  No gallop  Pulmonary:      Effort: Pulmonary effort is normal  No respiratory distress  Breath sounds: Normal breath sounds  No wheezing or rales  Chest:   Breasts:      Right: No axillary adenopathy or supraclavicular adenopathy  Left: No axillary adenopathy or supraclavicular adenopathy  Abdominal:      General: There is no distension  Palpations: There is no mass  Tenderness: There is no abdominal tenderness  There is no rebound  Musculoskeletal:         General: No swelling or tenderness  Right lower leg: No edema  Left lower leg: No edema  Lymphadenopathy:      Head:      Right side of head: No submandibular, preauricular or posterior auricular adenopathy        Left side of head: No submandibular, preauricular or posterior auricular adenopathy  Cervical: No cervical adenopathy  Right cervical: No superficial or posterior cervical adenopathy  Left cervical: No superficial or posterior cervical adenopathy  Upper Body:      Right upper body: No supraclavicular or axillary adenopathy  Left upper body: No supraclavicular or axillary adenopathy  Lower Body: No right inguinal (+ seroma ) adenopathy  Skin:     Findings: No rash  Comments: Well healed surgical scar  No evidence of recurrence at primary site  Neurological:      General: No focal deficit present  Mental Status: He is alert and oriented to person, place, and time  Psychiatric:         Mood and Affect: Mood normal          Behavior: Behavior normal          Thought Content: Thought content normal          Judgment: Judgment normal          I reviewed lab data in the chart      WBC   Date Value Ref Range Status   09/16/2022 10 95 (H) 4 31 - 10 16 Thousand/uL Final   09/07/2022 13 98 (H) 4 31 - 10 16 Thousand/uL Final   08/30/2022 10 56 (H) 4 31 - 10 16 Thousand/uL Final     Hemoglobin   Date Value Ref Range Status   09/16/2022 13 3 12 0 - 17 0 g/dL Final   09/07/2022 12 1 12 0 - 17 0 g/dL Final   08/30/2022 11 1 (L) 12 0 - 17 0 g/dL Final     Platelets   Date Value Ref Range Status   09/16/2022 552 (H) 149 - 390 Thousands/uL Final   09/07/2022 607 (H) 149 - 390 Thousands/uL Final   08/30/2022 449 (H) 149 - 390 Thousands/uL Final     MCV   Date Value Ref Range Status   09/16/2022 119 (H) 82 - 98 fL Final   09/07/2022 122 (H) 82 - 98 fL Final   08/30/2022 129 (H) 82 - 98 fL Final      Potassium   Date Value Ref Range Status   09/07/2022 3 3 (L) 3 5 - 5 3 mmol/L Final   08/30/2022 3 8 3 5 - 5 3 mmol/L Final   08/04/2022 3 1 (L) 3 5 - 5 3 mmol/L Final   08/04/2022 3 1 (L) 3 5 - 5 3 mmol/L Final     Chloride   Date Value Ref Range Status   09/07/2022 106 96 - 108 mmol/L Final   08/30/2022 110 (H) 96 - 108 mmol/L Final 08/04/2022 106 96 - 108 mmol/L Final   08/04/2022 106 96 - 108 mmol/L Final     CO2   Date Value Ref Range Status   09/07/2022 29 21 - 32 mmol/L Final   08/30/2022 27 21 - 32 mmol/L Final   08/04/2022 28 21 - 32 mmol/L Final   08/04/2022 27 21 - 32 mmol/L Final     CO2, i-STAT   Date Value Ref Range Status   05/18/2017 26 21 - 32 mmol/L Final     BUN   Date Value Ref Range Status   09/07/2022 14 5 - 25 mg/dL Final   08/30/2022 15 5 - 25 mg/dL Final   08/04/2022 34 (H) 5 - 25 mg/dL Final   08/04/2022 34 (H) 5 - 25 mg/dL Final     Creatinine   Date Value Ref Range Status   09/07/2022 1 19 0 60 - 1 30 mg/dL Final     Comment:     Standardized to IDMS reference method   08/30/2022 1 16 0 60 - 1 30 mg/dL Final     Comment:     Standardized to IDMS reference method   08/04/2022 1 29 0 60 - 1 30 mg/dL Final     Comment:     Standardized to IDMS reference method   08/04/2022 1 25 0 60 - 1 30 mg/dL Final     Comment:     Standardized to IDMS reference method     Glucose   Date Value Ref Range Status   09/07/2022 134 65 - 140 mg/dL Final     Comment:     If the patient is fasting, the ADA then defines impaired fasting glucose as > 100 mg/dL and diabetes as > or equal to 123 mg/dL  Specimen collection should occur prior to Sulfasalazine administration due to the potential for falsely depressed results  Specimen collection should occur prior to Sulfapyridine administration due to the potential for falsely elevated results  08/30/2022 126 65 - 140 mg/dL Final     Comment:     If the patient is fasting, the ADA then defines impaired fasting glucose as > 100 mg/dL and diabetes as > or equal to 123 mg/dL  Specimen collection should occur prior to Sulfasalazine administration due to the potential for falsely depressed results  Specimen collection should occur prior to Sulfapyridine administration due to the potential for falsely elevated results     08/04/2022 116 65 - 140 mg/dL Final     Comment:     If the patient is fasting, the ADA then defines impaired fasting glucose as > 100 mg/dL and diabetes as > or equal to 123 mg/dL  Specimen collection should occur prior to Sulfasalazine administration due to the potential for falsely depressed results  Specimen collection should occur prior to Sulfapyridine administration due to the potential for falsely elevated results  08/04/2022 117 65 - 140 mg/dL Final     Comment:     If the patient is fasting, the ADA then defines impaired fasting glucose as > 100 mg/dL and diabetes as > or equal to 123 mg/dL  Specimen collection should occur prior to Sulfasalazine administration due to the potential for falsely depressed results  Specimen collection should occur prior to Sulfapyridine administration due to the potential for falsely elevated results  Calcium   Date Value Ref Range Status   09/07/2022 9 5 8 3 - 10 1 mg/dL Final   08/30/2022 8 9 8 3 - 10 1 mg/dL Final   08/04/2022 9 3 8 3 - 10 1 mg/dL Final   08/04/2022 9 2 8 3 - 10 1 mg/dL Final     Albumin   Date Value Ref Range Status   09/07/2022 3 4 (L) 3 5 - 5 0 g/dL Final   08/30/2022 3 3 (L) 3 5 - 5 0 g/dL Final   08/04/2022 3 5 3 5 - 5 0 g/dL Final     Total Bilirubin   Date Value Ref Range Status   09/07/2022 1 36 (H) 0 20 - 1 00 mg/dL Final     Comment:     Use of this assay is not recommended for patients undergoing treatment with eltrombopag due to the potential for falsely elevated results  08/30/2022 1 58 (H) 0 20 - 1 00 mg/dL Final     Comment:     Use of this assay is not recommended for patients undergoing treatment with eltrombopag due to the potential for falsely elevated results  08/04/2022 7 70 (H) 0 20 - 1 00 mg/dL Final     Comment:     Use of this assay is not recommended for patients undergoing treatment with eltrombopag due to the potential for falsely elevated results       Alkaline Phosphatase   Date Value Ref Range Status   09/07/2022 62 46 - 116 U/L Final   08/30/2022 67 46 - 116 U/L Final   08/04/2022 96 46 - 116 U/L Final     AST   Date Value Ref Range Status   09/07/2022 20 5 - 45 U/L Final     Comment:     Specimen collection should occur prior to Sulfasalazine administration due to the potential for falsely depressed results  08/30/2022 20 5 - 45 U/L Final     Comment:     Specimen collection should occur prior to Sulfasalazine administration due to the potential for falsely depressed results  08/04/2022 65 (H) 5 - 45 U/L Final     Comment:     Specimen collection should occur prior to Sulfasalazine administration due to the potential for falsely depressed results  ALT   Date Value Ref Range Status   09/07/2022 29 12 - 78 U/L Final     Comment:     Specimen collection should occur prior to Sulfasalazine and/or Sulfapyridine administration due to the potential for falsely depressed results  08/30/2022 33 12 - 78 U/L Final     Comment:     Specimen collection should occur prior to Sulfasalazine and/or Sulfapyridine administration due to the potential for falsely depressed results  08/04/2022 51 12 - 78 U/L Final     Comment:     Specimen collection should occur prior to Sulfasalazine and/or Sulfapyridine administration due to the potential for falsely depressed results  LD   Date Value Ref Range Status   07/30/2022 1,077 (H) 81 - 234 U/L Final   12/22/2020 754 (H) 81 - 234 U/L Final   12/20/2020 1,083 (H) 81 - 234 U/L Final     Comment:     15     LDH   Date Value Ref Range Status   03/11/2021 368 (H) 121 - 224 IU/L Final     No results found for: TSH  No results found for: [id number]   No results found for: FREET4      RECENT IMAGING:  Procedure: MRI brain w wo contrast    Result Date: 9/15/2022  Narrative: MRI BRAIN WITH AND WITHOUT CONTRAST INDICATION: C43 71: Malignant melanoma of right lower limb, including hip  COMPARISON:  None  TECHNIQUE: Sagittal T1, axial T2, axial FLAIR, axial T1, axial [place], axial diffusion   Sagittal, axial T1 postcontrast   Axial bravo postcontrast with coronal reconstructions  IV Contrast:  8 mL of Gadobutrol injection (SINGLE-DOSE)  IMAGE QUALITY:   Diagnostic  FINDINGS: BRAIN PARENCHYMA: T2/FLAIR hyperintensity, mildly elevated diffusion, enhancement and mild encephalomalacia in the right occipital lobe consistent with a late subacute early chronic infarct  Few punctate foci of elevated diffusion and T2/FLAIR hyperintensity in the right frontoparietal subcortical and deep white matter could also represent infarcts of similar chronicity  Periventricular and subcortical T-2/FLAIR hyperintense foci, consistent with microangiopathic disease  No intracranial hemorrhage, mass or mass effect  No abnormal parenchymal or extra-axial enhancement VENTRICLES:  No hydrocephalus or extra-axial collection  SELLA AND PITUITARY GLAND:  No sellar enlargement  ORBITS:  No retro-orbital mass  PARANASAL SINUSES:  Opacified, atelectatic right maxillary sinus  VASCULATURE:  Evaluation of the major intracranial vasculature demonstrates appropriate flow voids  CALVARIUM AND SKULL BASE: Bilateral mastoid effusions  No osseous lesion  EXTRACRANIAL SOFT TISSUES:  No soft tissue mass  Impression: 1  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  Punctate subcortical infarcts in the right parietal region of similar chronicity  No mass effect or hemorrhagic conversion  2   No evidence of metastatic disease  The study was marked in EPIC for significant notification  Workstation performed: [id number]             Assessment/Plan  Mr Rk Paz is a 75 yr male with Stage IIIC melanoma here for follow up  1  Malignant melanoma of leg, right (HCC)  We discussed that his imaging was negative for metastatic disease, so he remains stage IIIC  We discussed he is at high risk for melanoma recurrence or metastasis  We again discussed the principle and reasoning behind adjuvant treatment    We discussed that given his hemolytic anemia and need for treatment with rituximab, treatment with adjuvant pembrolizumab is contraindicated at this time  We also discussed the use of BRAF/MEK inhibitors in the adjuvant setting and the overall reduced RFS and improved PFS in Stage III melanoma treated with dabrafenib and trametinib  He does have a BRAF V600K mutation and given his autoimmune condition I would recommend use of targeted therapy in the adjuvant setting  We did discuss the concern for development of treatment resistance  We discussed the side effects of the combination of the inhibitors which includes but is not limited to rash, migratory arthralgias and myalgias, GI distress, LFT abnormalities, potential for development of SCC, and cardiac toxicity  We discussed that there are a few companies that make these drug combination and that unique side effects included extreme photosensitivity, pyrexia/fevers, and ocular toxicity for each of the different combinations  He remains uncertain of whether not he would like to pursue treatment with adjuvant therapy  I explained that it is his decision  He has all information regarding the medications and he will continue to think about adjuvant treatment  We discussed that regardless of adjuvant treatment, he will require ongoing monitoring and surveillance, both for disease recurrence as well as a new primary melanoma as well as other nonmelanoma skin cancers  This includes physical exam every 3 months for 3 years and then every 6 months until year five, then annually with physical exams and labs, including a comprehensive metabolic panel, CBC with differential and LDH; periodic imaging studies with either CT chest, abdomen and pelvis or PET/CT scans every 6 months  He will return in approximately 1 month for follow-up and determining adjuvant treatment  He knows to call with any issues or concerns prior to his next visit  2  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

## 2022-09-19 NOTE — BRIEF OP NOTE (RAD/CATH)
INTERVENTIONAL RADIOLOGY PROCEDURE NOTE    Date: 9/19/2022    Procedure: IR DRAINAGE TUBE PLACEMENT    Preoperative diagnosis:   1  Malignant melanoma of leg, right (HCC)         Postoperative diagnosis: Same  Surgeon: Nela Taylor MD     Assistant: None  No qualified resident was available  Blood loss: Minimal    Specimens: 40 cc serous fluid     Findings: Right inguinal fluid collection  10 F drain placed  70 CC fluid removed  Complications: None immediate      Anesthesia: local

## 2022-09-19 NOTE — SEDATION DOCUMENTATION
IR right groin drain placed by Alexander Nails  Fluid sent for cultures  Patient tolerated procedure well  Patient educated on tube care and flushing  AVS printed and reviewed with patient and patient d/c to home

## 2022-09-19 NOTE — DISCHARGE INSTRUCTIONS
TUBE CARE INSTRUCTIONS    Care after your procedure:    Resume your normal diet  Small sips of flat soda will help with nausea  1  The properly functioning catheter should be forward flushed once (1x) daily with 10ml of normal saline using clean technique  You will be given a prescription for flushes  To flush the tube, clean both connections with alcohol swab  Twist off the drainage bag/ bulb  tubing and twist the saline syringe into the drainage tube and flush  Remove the syringe and twist the drainage bag / bulb tubing tubing back on     2  The drainage bag/bulb may be emptied as necessary  Keep a record of the amount of fluid you drain from your tube  This should be done with clean technique as well  3  A fresh dressing should be applied daily over the tube insertion site  4  As the tube is secured to the skin with only a suture,try not to pull on your tube  Tub baths are not permitted  Showers are permitted if the patient's skin entry site is prevented from getting wet  Similarly, washcloth "baths" are acceptable  Contact Interventional Radiology at [phone] Viktoriya PATIENTS: Contact Interventional Radiology at [phone]) Sharmin Newell PATIENTS: Contact Interventional Radiology at [phone]) if:    1  Leakage or large amounts of liquid around the catheter  2  Fever of 101 degrees lasting several hours without other obvious cause (such as sore throat, flu, etc)  3  Persistent nausea or vomiting  4  Diminished drainage, which may be associated with pressure or pain  Or when the     drainage from your tube is less than 10mls for 48 hours  5  Catheter pulled back or falls out  The following pharmacies carry the flush syringes         [facility]                     [facility]  [address] [facility]                         [address] [facility]  Phone [phone]            Phone [phone]   [place] [address]                                [phone]  [address] [facility]                      [address] [place]  Phone [phone]            Phone [phone]                      Milind Rodriguez                                                                                                          [phone]  [facility]  [facility] [address]    [address]  Phone [phone]

## 2022-09-21 LAB — SCAN RESULT: NORMAL

## 2022-09-22 ENCOUNTER — OFFICE VISIT (OUTPATIENT)
Dept: HEMATOLOGY ONCOLOGY | Facility: CLINIC | Age: 67
End: 2022-09-22
Payer: MEDICARE

## 2022-09-22 VITALS
RESPIRATION RATE: 17 BRPM | OXYGEN SATURATION: 98 % | BODY MASS INDEX: 30.06 KG/M2 | HEIGHT: 67 IN | DIASTOLIC BLOOD PRESSURE: 80 MMHG | SYSTOLIC BLOOD PRESSURE: 128 MMHG | HEART RATE: 97 BPM | WEIGHT: 191.5 LBS

## 2022-09-22 DIAGNOSIS — I26.09 OTHER PULMONARY EMBOLISM WITH ACUTE COR PULMONALE, UNSPECIFIED CHRONICITY (HCC): ICD-10-CM

## 2022-09-22 DIAGNOSIS — D75.839 THROMBOCYTOSIS: ICD-10-CM

## 2022-09-22 DIAGNOSIS — E80.6 HYPERBILIRUBINEMIA: ICD-10-CM

## 2022-09-22 DIAGNOSIS — D59.10 AUTOIMMUNE HEMOLYTIC ANEMIA (HCC): Primary | ICD-10-CM

## 2022-09-22 LAB — BACTERIA SPEC ANAEROBE CULT: NORMAL

## 2022-09-22 PROCEDURE — 88342 IMHCHEM/IMCYTCHM 1ST ANTB: CPT | Performed by: SPECIALIST

## 2022-09-22 PROCEDURE — 88341 IMHCHEM/IMCYTCHM EA ADD ANTB: CPT | Performed by: SPECIALIST

## 2022-09-22 PROCEDURE — 99215 OFFICE O/P EST HI 40 MIN: CPT | Performed by: NURSE PRACTITIONER

## 2022-09-22 PROCEDURE — 88112 CYTOPATH CELL ENHANCE TECH: CPT | Performed by: SPECIALIST

## 2022-09-22 PROCEDURE — 88305 TISSUE EXAM BY PATHOLOGIST: CPT | Performed by: SPECIALIST

## 2022-09-22 NOTE — PROGRESS NOTES
[address] [facility] [id number] [phone]  Oncology Progress Note  Alise Shi, 1955, [id number]  9/22/2022        Assessment/Plan:   1  Autoimmune hemolytic anemia (HCC)  2  Other pulmonary embolism with acute cor pulmonale, unspecified chronicity ([facility] [address] )  3  Thrombocytosis  4  Hyperbilirubinemia   Patient is a 13-year-old male with a history of AIHA managed on prednisone to maintain hemoglobin between 10-11  In July 2022 patient was hospitalized secondary to wide excision of right medial thigh melanoma  He had a sentinel lymph node biopsy With rare metastatic melanoma cells, wide excision specimen showed residual melanoma, nodular type and scar, margins were free  He follows with Dr Fredrick Wisdom for management of melanoma  She has discussed adjuvant treatment with the use of B Alfredo/MEK inhibitors such as Braftovi or Mektovi  She did not recommend immunotherapy given his history of AIHA  Patient also had swelling of the right groin, sentinel lymph node site and subsequently has a YELITZA drain for seroma  He is following with surgical oncology and interventional radiology for management of the drain  We have been weaning his prednisone currently on 30 mg p o  daily  We discussed decreasing to 20 mg as his has been normal since 09/07/2022  Currently it is 13 3  WBC increased at 10 95, , platelets 856, absolute monocytes 1 24  I suspect leukocytosis and thrombocytosis are secondary to recent surgery and drain placement  We will continue to monitor his hemoglobin and adjust prednisone accordingly  Patient also continues on Pradaxa for pulmonary embolism that was found during hospitalization  We will plan to see him in 1 month, patient verbalized understanding and is in agreement with the plan  - CBC; Standing  - Comprehensive metabolic panel;  Future  - CBC    Goals and Barriers:    Current Goal:   Prolong Survival from Cancer  Barriers: None  Patient's Capacity to Self Care:  Patient is able to self care  [address]  ______________________________________________________________________________________________________________    Subjective     History of present illness/Cancer History:   Oncology History   Malignant melanoma of leg, right ([address] )   5/31/2022 Biopsy    Right Leg, Shave Biopsy   Melanoma extending to the deep specimen margin  Thickness: 7 0mm  Ulceration: not seen   Mitoses: 3      5/31/2022 -  Cancer Staged    Staging form: Melanoma of the Skin, AJCC 8th Edition  - Clinical stage from 5/31/2022: Stage IIB (cT4a, cN0, cM0) - Signed by Brooklyn Farris MD on 8/25/2022 7/1/2022 Initial Diagnosis    Malignant melanoma of leg, right ([address] )     7/29/2022 Surgery    A  Lymph node, sentinel, #1:  One lymph node with rare metastatic melanoma cells (1/1), subcapsular location  Immunohistochemical study for MART-1, HMB45 and S100 supports the findings  B  Leg, right, knee, wide excision:  Residual melanoma, nodular type, and scar; margins free  See synoptic report       7/29/2022 -  Cancer Staged    Staging form: Melanoma of the Skin, AJCC 8th Edition  - Pathologic stage from 7/29/2022: Stage IIIC (pT4a, pN1a, cM0) - Signed by Brooklyn Farris MD on 8/25/2022          Lab Results   Component Value Date    WBC 10 95 (H) 09/16/2022    HGB 13 3 09/16/2022    HCT 40 2 09/16/2022     (H) 09/16/2022     (H) 09/16/2022     Lab Results   Component Value Date    SODIUM 140 09/07/2022    K 3 3 (L) 09/07/2022     09/07/2022    CO2 29 09/07/2022    AGAP 5 09/07/2022    BUN 14 09/07/2022    CREATININE 1 19 09/07/2022    GLUC 134 09/07/2022    GLUF 123 (H) 07/05/2022    CALCIUM 9 5 09/07/2022    AST 20 09/07/2022    ALT 29 09/07/2022    ALKPHOS 62 09/07/2022    TP 6 3 (L) 09/07/2022    TBILI 1 36 (H) 2022    EGFR 62 2022     Interval history:  Clinically stable     ECO - Symptomatic but completely ambulatory    Review of Systems   Constitutional: Negative for activity change, appetite change, fatigue, fever and unexpected weight change  Respiratory: Negative for cough and shortness of breath  Cardiovascular: Positive for leg swelling (RLE)  Negative for chest pain  Gastrointestinal: Negative for abdominal pain, constipation, diarrhea and nausea  Endocrine: Negative for cold intolerance and heat intolerance  Musculoskeletal: Negative for arthralgias and myalgias  R groin drain   Skin: Negative  Neurological: Negative for dizziness, weakness and headaches  Hematological: Negative for adenopathy  Does not bruise/bleed easily         Current Outpatient Medications:     acetaminophen (TYLENOL) 325 mg tablet, Take 2 tablets (650 mg total) by mouth every 6 (six) hours as needed for mild pain, Disp:  , Rfl: 0    ALPRAZolam (XANAX) 0 5 mg tablet, Take 1 tablet (0 5 mg total) by mouth 2 (two) times a day Take one two hours before scan and second one 30 min before, Disp: 2 tablet, Rfl: 0    benzonatate (TESSALON PERLES) 100 mg capsule, Take 1 capsule (100 mg total) by mouth 3 (three) times a day, Disp: 20 capsule, Rfl: 0    dabigatran etexilate (PRADAXA) 150 mg capsu, Take 1 capsule (150 mg total) by mouth every 12 (twelve) hours, Disp: 60 capsule, Rfl: 0    furosemide (LASIX) 20 mg tablet, Take 1 tablet (20 mg total) by mouth daily, Disp: 5 tablet, Rfl: 0    hydrochlorothiazide (HYDRODIURIL) 25 mg tablet, Take 1 tablet (25 mg total) by mouth daily (Patient taking differently: Take 25 mg by mouth every morning), Disp: 30 tablet, Rfl: 5    metoprolol succinate (TOPROL-XL) 25 mg 24 hr tablet, Take 0 5 tablets (12 5 mg total) by mouth daily (Patient taking differently: Take 12 5 mg by mouth every morning), Disp: 30 tablet, Rfl: 5    pantoprazole (PROTONIX) 40 mg tablet, Take 1 tablet (40 mg total) by mouth daily (Patient taking differently: Take 40 mg by mouth every morning), Disp: 90 tablet, Rfl: 3    potassium chloride (K-DUR,KLOR-CON) 20 mEq tablet, Take 1 tablet (20 mEq total) by mouth daily (Patient taking differently: Take 20 mEq by mouth every morning), Disp: 30 tablet, Rfl: 3    prazosin (MINIPRESS) 1 mg capsule, Take 1 mg by mouth daily at bedtime , Disp: , Rfl:     predniSONE 20 mg tablet, Take 2 tablets (40 mg total) by mouth daily, Disp: 60 tablet, Rfl: 0    sodium chloride, PF, 0 9 %, 10 mL by Intracatheter route daily Intracatheter flushing daily  May substitute prefilled syringe with normal saline 10 mL vials, 10 mL syringes, and 18 g blunt needles, Disp: 300 mL, Rfl: 0    sulfamethoxazole-trimethoprim (BACTRIM DS) 800-160 mg per tablet, Take 1 tablet by mouth 3 (three) times a week Monday, Wednesday and Friday, Disp: 12 tablet, Rfl: 0    traMADol (Ultram) 50 mg tablet, Take 1 tablet (50 mg total) by mouth every 6 (six) hours as needed for moderate pain, Disp: 10 tablet, Rfl: 0    VITAMIN D PO, Take 1,000 Units by mouth daily , Disp: , Rfl:     ascorbic acid (VITAMIN C) 1000 MG tablet, Take 1 tablet (1,000 mg total) by mouth every 12 (twelve) hours for 6 doses (Patient taking differently: Take 1,000 mg by mouth daily Every other day), Disp: 6 tablet, Rfl: 0  No Known Allergies    Objective   /80 (BP Location: Left arm, Patient Position: Sitting, Cuff Size: Adult)   Pulse 97   Resp 17   Ht 5' 7" (1 702 m)   Wt 86 9 kg (191 lb 8 oz)   SpO2 98%   BMI 29 99 kg/m²   Wt Readings from Last 6 Encounters:   09/22/22 86 9 kg (191 lb 8 oz)   09/19/22 88 kg (194 lb)   09/07/22 87 5 kg (193 lb)   08/24/22 86 4 kg (190 lb 8 oz)   08/23/22 85 7 kg (188 lb 15 oz)   08/22/22 85 7 kg (189 lb)   Physical Exam  Constitutional:       Appearance: Normal appearance  He is well-developed  HENT:      Head: Normocephalic and atraumatic     Eyes:      Pupils: Pupils are equal, round, and reactive to light  Pulmonary:      Effort: Pulmonary effort is normal  No respiratory distress  Musculoskeletal:         General: Normal range of motion  Cervical back: Normal range of motion  Right lower leg: Edema present  Comments: Right groin drain intact   Lymphadenopathy:      Cervical: No cervical adenopathy  Skin:     General: Skin is dry  Neurological:      Mental Status: He is alert and oriented to person, place, and time     Psychiatric:         Behavior: Behavior normal      The following historical data was reviewed:  Past Medical History:   Diagnosis Date    Anemia 11/2/2016    Anxiety     Arthritis     Autoimmune hemolytic anemia ([address] )     Claustrophobia     COVID 12/2020    DVT (deep venous thrombosis) (HCC)     GERD (gastroesophageal reflux disease)     Hearing loss, right     Hemolytic anemia ([address] )     History of transfusion     2018 - no adverse reaction    Hypertension     Malignant melanoma of leg, right ([address] ) 7/1/2022    Palpitation     Portal vein thrombosis     PTSD (post-traumatic stress disorder)     Pulmonary emboli (HCC)     Tobacco abuse      Past Surgical History:   Procedure Laterality Date    CATARACT EXTRACTION Bilateral     CHOLECYSTECTOMY  7/18/2017    COLONOSCOPY      ELBOW ARTHROPLASTY Left     bursectomy    JOINT REPLACEMENT Right 2/2/2021    knee    KNEE SURGERY Right     meniscus tear    LYMPH NODE BIOPSY Right 7/29/2022    Procedure: BIOPSY LYMPH NODE SENTINEL;  Surgeon: Bobby Mariscal MD;  [facility];  Service: Surgical Oncology    FL LAP,CHOLECYSTECTOMY/GRAPH N/A 12/23/2017    Procedure: CHOLECYSTECTOMY LAPAROSCOPIC with cholangiogram;  Surgeon: Mamie Gowers, MD;  [facility];  Service: General    FL REMOVAL SPLEEN, TOTAL N/A 5/18/2017    Procedure: LAPAROSCOPIC HAND ASSIST SPLENECTOMY;  Surgeon: Henok Olmedo MD;  [facility];  Service: Surgical Oncology    FL TOTAL KNEE ARTHROPLASTY Right 2/2/2021    Procedure: ARTHROPLASTY KNEE TOTAL;  Surgeon: Rayne High MD;  [facility];  Service: Orthopedics    SD TOTAL KNEE ARTHROPLASTY Left 11/17/2021    Procedure: TOTAL KNEE REPLACEMENT;  Surgeon: Rayne High MD;  [facility];  Service: Orthopedics    SHOULDER SURGERY Left     rotator cuff x4, reconstruction    SKIN LESION EXCISION Right 7/29/2022    Procedure: WIDE EXCISION RIGHT MEDIAL THIGH;  Surgeon: Felton Zarate MD;  [facility];  Service: Surgical Oncology     Please note: This report has been generated by a voice recognition software system  Therefore there may be syntax, spelling, and/or grammatical errors  Please call if you have any questions

## 2022-09-24 LAB
BACTERIA SPEC BFLD CULT: ABNORMAL
GRAM STN SPEC: ABNORMAL

## 2022-09-25 NOTE — PROGRESS NOTES
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        Oncology History   Malignant melanoma of leg, right ([address] )   5/31/2022 Biopsy    Right Leg, Shave Biopsy   Melanoma extending to the deep specimen margin  Thickness: 7 0mm  Ulceration: not seen   Mitoses: 3      5/31/2022 -  Cancer Staged    Staging form: Melanoma of the Skin, AJCC 8th Edition  - Clinical stage from 5/31/2022: Stage IIB (cT4a, cN0, cM0) - Signed by Jose A Carolina MD on 8/25/2022 7/1/2022 Initial Diagnosis    Malignant melanoma of leg, right ([address] )     7/29/2022 Surgery    A  Lymph node, sentinel, #1:  One lymph node with rare metastatic melanoma cells (1/1), subcapsular location  Immunohistochemical study for MART-1, HMB45 and S100 supports the findings  B  Leg, right, knee, wide excision:  Residual melanoma, nodular type, and scar; margins free  See synoptic report       7/29/2022 -  Cancer Staged    Staging form: Melanoma of the Skin, AJCC 8th Edition  - Pathologic stage from 7/29/2022: Stage IIIC (pT4a, pN1a, cM0) - Signed by Jose A Carolina MD on 8/25/2022          Cancer Staging  Malignant melanoma of leg, right [facility])  Staging form: Melanoma of the Skin, AJCC 8th Edition  - Clinical stage from 5/31/2022: Stage IIB (cT4a, cN0, cM0) - Signed by Jose A Carolina MD on 8/25/2022  - Pathologic stage from 7/29/2022: Stage IIIC (pT4a, pN1a, cM0) - Signed by Jose A Carolina MD on 8/25/2022     Treatment Details   Treatment goal [No plan goal]   Plan Name ONE-TIME BLOOD PRODUCT TRANSFUSION PLAN   Status Active   Start Date 7/15/2022   End Date Until discontinued   Provider Nasir Boozer, CRNP   Chemotherapy [No matching medication found in this treatment plan]     Treatment Details   Treatment goal Palliative   Plan Name OP RiTUXimab weekly x 4, every 6 months   Status Active   Start Date 8/1/2022   End Date 8/23/2022   Provider Kennedi Duran DO   Chemotherapy riTUXimab (RITUXAN) subsequent titrated chemo infusion, 375 mg/m2 = 746 2 mg, Intravenous, Once, 1 of 1 cycle  Administration: 746 2 mg (8/9/2022), 746 2 mg (8/16/2022), 746 2 mg (8/23/2022)    riTUXimab (RITUXAN) first titrated chemo infusion, 375 mg/m2 = 746 2 mg, Intravenous, Once, 1 of 1 cycle  Administration: 746 2 mg (8/1/2022)          HISTORY OF PRESENT ILLNESS: Lanre Chandra is a 75 y o  male  who has Stage IIIC melanoma here for follow up  He is doing ok  Had the seroma in his right groin drained, but it has reaccumulated  He is scheduled for drain placement with IR this week  Surgical site continues to heal   Did use lasix and it improved his LE swelling a little bit, but right leg still swollen  No new, changing, or concerning lesions  No new LAD  We discussed his imaging in which the PET scan was negative for melanoma recurrence or metastasis and his brain MRI was negative for metastatic disease  REVIEW OF SYSTEMS:  Review of Systems   Constitutional: Negative for appetite change, fatigue, fever and unexpected weight change  HENT:   Negative for lump/mass  Eyes: Negative for icterus  Respiratory: Negative for cough, shortness of breath and wheezing  Cardiovascular: Positive for leg swelling (RLE)  Gastrointestinal: Negative for abdominal pain, constipation, diarrhea, nausea and vomiting  Genitourinary: Negative for difficulty urinating and hematuria  Musculoskeletal: Negative for arthralgias, gait problem and myalgias  Skin: Negative for itching and rash  No new, changing, or concerning lesions  Neurological: Negative for extremity weakness, gait problem, headaches, light-headedness and numbness     Hematological: Negative for adenopathy          MEDICATIONS:    Current Outpatient Medications:     acetaminophen (TYLENOL) 325 mg tablet, Take 2 tablets (650 mg total) by mouth every 6 (six) hours as needed for mild pain, Disp:  , Rfl: 0    ALPRAZolam (XANAX) 0 5 mg tablet, Take 1 tablet (0 5 mg total) by mouth 2 (two) times a day Take one two hours before scan and second one 30 min before, Disp: 2 tablet, Rfl: 0    benzonatate (TESSALON PERLES) 100 mg capsule, Take 1 capsule (100 mg total) by mouth 3 (three) times a day, Disp: 20 capsule, Rfl: 0    dabigatran etexilate (PRADAXA) 150 mg capsu, Take 1 capsule (150 mg total) by mouth every 12 (twelve) hours, Disp: 60 capsule, Rfl: 0    furosemide (LASIX) 20 mg tablet, Take 1 tablet (20 mg total) by mouth daily, Disp: 5 tablet, Rfl: 0    hydrochlorothiazide (HYDRODIURIL) 25 mg tablet, Take 1 tablet (25 mg total) by mouth daily (Patient taking differently: Take 25 mg by mouth every morning), Disp: 30 tablet, Rfl: 5    metoprolol succinate (TOPROL-XL) 25 mg 24 hr tablet, Take 0 5 tablets (12 5 mg total) by mouth daily (Patient taking differently: Take 12 5 mg by mouth every morning), Disp: 30 tablet, Rfl: 5    pantoprazole (PROTONIX) 40 mg tablet, Take 1 tablet (40 mg total) by mouth daily (Patient taking differently: Take 40 mg by mouth every morning), Disp: 90 tablet, Rfl: 3    potassium chloride (K-DUR,KLOR-CON) 20 mEq tablet, Take 1 tablet (20 mEq total) by mouth daily (Patient taking differently: Take 20 mEq by mouth every morning), Disp: 30 tablet, Rfl: 3    prazosin (MINIPRESS) 1 mg capsule, Take 1 mg by mouth daily at bedtime , Disp: , Rfl:     predniSONE 20 mg tablet, Take 2 tablets (40 mg total) by mouth daily, Disp: 60 tablet, Rfl: 0    sulfamethoxazole-trimethoprim (BACTRIM DS) 800-160 mg per tablet, Take 1 tablet by mouth 3 (three) times a week Monday, Wednesday and Friday, Disp: 12 tablet, Rfl: 0    traMADol (Ultram) 50 mg tablet, Take 1 tablet (50 mg total) by mouth every 6 (six) hours as needed for moderate pain, Disp: 10 tablet, Rfl: 0    VITAMIN D PO, Take 1,000 Units by mouth daily , Disp: , Rfl:     ascorbic acid (VITAMIN C) 1000 MG tablet, Take 1 tablet (1,000 mg total) by mouth every 12 (twelve) hours for 6 doses (Patient taking differently: Take 1,000 mg by mouth daily Every other day), Disp: 6 tablet, Rfl: 0    sodium chloride, PF, 0 9 %, 10 mL by Intracatheter route daily Intracatheter flushing daily   May substitute prefilled syringe with normal saline 10 mL vials, 10 mL syringes, and 18 g blunt needles, Disp: 300 mL, Rfl: 0     ALLERGIES:  No Known Allergies     ACTIVE PROBLEMS:  Patient Active Problem List   Diagnosis    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    Hyperbilirubinemia    Symptomatic anemia    Pulmonary embolism with acute cor pulmonale (HCC)    Portal vein thrombosis    Elevated blood pressure reading without diagnosis of hypertension    Shortness of breath    Gastroesophageal reflux disease    Autoimmune hemolytic anemia    ARABELLA [facility]acute kidney [facility]) [facility] [address] [facility]    Splenomegaly    Iron overload due to repeated red blood cell transfusions    Posttraumatic stress disorder    Essential hypertension    Glucose intolerance (impaired glucose tolerance)    Renal insufficiency    Auto immune neutropenia (HCC)    Primary osteoarthritis of left knee    Pain in joint, lower leg    Pain in left knee    COVID-19    Thrombocytosis    Primary localized osteoarthrosis of the knee, right    Hyperglycemia    Chronic bilateral low back pain with bilateral sciatica    Hypercholesterolemia    Malignant melanoma of leg, right (HCC)    Dyspnea    Acute respiratory failure with hypoxia (HCC)    Elevated serum creatinine    Elevated bilirubin    Leukocytosis          PAST MEDICAL HISTORY:   Past Medical History:   Diagnosis Date    Anemia 11/2/2016    Anxiety     Arthritis     Autoimmune hemolytic anemia (HCC)     Claustrophobia     COVID 12/2020    DVT (deep venous thrombosis) (HCC)     GERD (gastroesophageal reflux disease)     Hearing loss, right     Hemolytic anemia ([address] )     History of transfusion     2018 - no adverse reaction    Hypertension     Malignant melanoma of leg, right ([address] ) 7/1/2022    Palpitation     Portal vein thrombosis     PTSD (post-traumatic stress disorder)     Pulmonary emboli (HCC)     Tobacco abuse         PAST SURGICAL HISTORY:  Past Surgical History:   Procedure Laterality Date    CATARACT EXTRACTION Bilateral     CHOLECYSTECTOMY  7/18/2017    COLONOSCOPY      ELBOW ARTHROPLASTY Left     bursectomy    IR DRAINAGE TUBE PLACEMENT  9/19/2022    JOINT REPLACEMENT Right 2/2/2021    knee    KNEE SURGERY Right     meniscus tear    LYMPH NODE BIOPSY Right 7/29/2022    Procedure: BIOPSY LYMPH NODE SENTINEL;  Surgeon: Mustapha Marcelino MD;  [facility];  Service: Surgical Oncology    WY LAP,CHOLECYSTECTOMY/GRAPH N/A 12/23/2017    Procedure: CHOLECYSTECTOMY LAPAROSCOPIC with cholangiogram;  Surgeon: Claudette Brock, MD;  [facility];  Service: General    WY REMOVAL SPLEEN, TOTAL N/A 5/18/2017    Procedure: LAPAROSCOPIC HAND ASSIST SPLENECTOMY;  Surgeon: Jeremy Askew MD;  [facility];  Service: Surgical Oncology    WY TOTAL KNEE ARTHROPLASTY Right 2/2/2021    Procedure: ARTHROPLASTY KNEE TOTAL;  Surgeon: Nikko Ramos MD;  [facility];  Service: Orthopedics    W[place] TOTAL KNEE ARTHROPLASTY Left 11/17/2021    Procedure: TOTAL KNEE REPLACEMENT;  Surgeon: Nikko Ramos MD;  [facility];  Service: Orthopedics    SHOULDER SURGERY Left     rotator cuff x4, reconstruction    SKIN LESION EXCISION Right 7/29/2022    Procedure: WIDE EXCISION RIGHT MEDIAL THIGH;  Surgeon: Mustapha Marcelino MD;  [facility];  Service: Surgical Oncology        SOCIAL HISTORY:  Social History     Socioeconomic History    Marital status: /Civil Union     Spouse name: None  Number of children: None    Years of education: None    Highest education level: None   Occupational History    None   Tobacco Use    Smoking status: Current Some Day Smoker     Packs/day: 0 00     Years: 5 00     Pack years: 0 00     Types: Cigars    Smokeless tobacco: Current User     Types: Snuff    Tobacco comment: 5  a week average   Vaping Use    Vaping Use: Never used   Substance and Sexual Activity    Alcohol use: Yes     Alcohol/week: 6 0 standard drinks     Types: 6 Cans of beer per week     Comment: socially    Drug use: Yes     Frequency: 3 0 times per week     Types: Marijuana     Comment: medical marijuana    Sexual activity: Yes     Partners: Female     Birth control/protection: None   Other Topics Concern    None   Social History Narrative    Daily coffee consumption (2 cups/day)    reitred     Social Determinants of Health     Financial Resource Strain: Not on file   Food Insecurity: No Food Insecurity    Worried About Running Out of Food in the Last Year: Never true    Michelle of Food in the Last Year: Never true   Transportation Needs: No Transportation Needs    Lack of Transportation (Medical): No    Lack of Transportation (Non-Medical): No   Physical Activity: Not on file   Stress: Not on file   Social Connections: Not on file   Intimate Partner Violence: Not on file   Housing Stability: Low Risk     Unable to Pay for Housing in the Last Year: No    Number of Places Lived in the Last Year: 1    Unstable Housing in the Last Year: No        FAMILY HISTORY:  Family History   Problem Relation Age of Onset    Cancer Mother     Breast cancer Mother     Other Father         CABG    Heart attack Paternal Grandfather         acute MI           Objective    PHYSICAL EXAMINATION:   Blood pressure 142/82, pulse 89, temperature (!) 97 3 °F (36 3 °C), resp  rate 17, height 5' 7" (1 702 m), weight 88 kg (194 lb), SpO2 97 %       Pain Score: 0-No pain     ECOG Performance Status Flowsheet Row Most Recent Value   ECOG Performance Status 0 - Fully active, able to carry on all pre-disease performance without restriction             Physical Exam  Constitutional:       General: He is not in acute distress  Appearance: Normal appearance  He is not toxic-appearing  HENT:      Mouth/Throat:      Mouth: Mucous membranes are moist       Pharynx: Oropharynx is clear  Eyes:      General: No scleral icterus  Cardiovascular:      Rate and Rhythm: Normal rate and regular rhythm  Pulses: Normal pulses  Heart sounds: No murmur heard  No friction rub  No gallop  Pulmonary:      Effort: Pulmonary effort is normal  No respiratory distress  Breath sounds: Normal breath sounds  No wheezing or rales  Chest:   Breasts:      Right: No axillary adenopathy or supraclavicular adenopathy  Left: No axillary adenopathy or supraclavicular adenopathy  Abdominal:      General: There is no distension  Palpations: There is no mass  Tenderness: There is no abdominal tenderness  There is no rebound  Musculoskeletal:         General: No swelling or tenderness  Right lower leg: No edema  Left lower leg: No edema  Lymphadenopathy:      Head:      Right side of head: No submandibular, preauricular or posterior auricular adenopathy  Left side of head: No submandibular, preauricular or posterior auricular adenopathy  Cervical: No cervical adenopathy  Right cervical: No superficial or posterior cervical adenopathy  Left cervical: No superficial or posterior cervical adenopathy  Upper Body:      Right upper body: No supraclavicular or axillary adenopathy  Left upper body: No supraclavicular or axillary adenopathy  Lower Body: No right inguinal (+ seroma ) adenopathy  Skin:     Findings: No rash  Comments: Well healed surgical scar  No evidence of recurrence at primary site     Neurological:      General: No focal deficit present  Mental Status: He is alert and oriented to person, place, and time  Psychiatric:         Mood and Affect: Mood normal          Behavior: Behavior normal          Thought Content: Thought content normal          Judgment: Judgment normal          I reviewed lab data in the chart      WBC   Date Value Ref Range Status   09/16/2022 10 95 (H) 4 31 - 10 16 Thousand/uL Final   09/07/2022 13 98 (H) 4 31 - 10 16 Thousand/uL Final   08/30/2022 10 56 (H) 4 31 - 10 16 Thousand/uL Final     Hemoglobin   Date Value Ref Range Status   09/16/2022 13 3 12 0 - 17 0 g/dL Final   09/07/2022 12 1 12 0 - 17 0 g/dL Final   08/30/2022 11 1 (L) 12 0 - 17 0 g/dL Final     Platelets   Date Value Ref Range Status   09/16/2022 552 (H) 149 - 390 Thousands/uL Final   09/07/2022 607 (H) 149 - 390 Thousands/uL Final   08/30/2022 449 (H) 149 - 390 Thousands/uL Final     MCV   Date Value Ref Range Status   09/16/2022 119 (H) 82 - 98 fL Final   09/07/2022 122 (H) 82 - 98 fL Final   08/30/2022 129 (H) 82 - 98 fL Final      Potassium   Date Value Ref Range Status   09/07/2022 3 3 (L) 3 5 - 5 3 mmol/L Final   08/30/2022 3 8 3 5 - 5 3 mmol/L Final   08/04/2022 3 1 (L) 3 5 - 5 3 mmol/L Final   08/04/2022 3 1 (L) 3 5 - 5 3 mmol/L Final     Chloride   Date Value Ref Range Status   09/07/2022 106 96 - 108 mmol/L Final   08/30/2022 110 (H) 96 - 108 mmol/L Final   08/04/2022 106 96 - 108 mmol/L Final   08/04/2022 106 96 - 108 mmol/L Final     CO2   Date Value Ref Range Status   09/07/2022 29 21 - 32 mmol/L Final   08/30/2022 27 21 - 32 mmol/L Final   08/04/2022 28 21 - 32 mmol/L Final   08/04/2022 27 21 - 32 mmol/L Final     CO2, i-STAT   Date Value Ref Range Status   05/18/2017 26 21 - 32 mmol/L Final     BUN   Date Value Ref Range Status   09/07/2022 14 5 - 25 mg/dL Final   08/30/2022 15 5 - 25 mg/dL Final   08/04/2022 34 (H) 5 - 25 mg/dL Final   08/04/2022 34 (H) 5 - 25 mg/dL Final     Creatinine   Date Value Ref Range Status   09/07/2022 1  19 0 60 - 1 30 mg/dL Final     Comment:     Standardized to IDMS reference method   08/30/2022 1 16 0 60 - 1 30 mg/dL Final     Comment:     Standardized to IDMS reference method   08/04/2022 1 29 0 60 - 1 30 mg/dL Final     Comment:     Standardized to IDMS reference method   08/04/2022 1 25 0 60 - 1 30 mg/dL Final     Comment:     Standardized to IDMS reference method     Glucose   Date Value Ref Range Status   09/07/2022 134 65 - 140 mg/dL Final     Comment:     If the patient is fasting, the ADA then defines impaired fasting glucose as > 100 mg/dL and diabetes as > or equal to 123 mg/dL  Specimen collection should occur prior to Sulfasalazine administration due to the potential for falsely depressed results  Specimen collection should occur prior to Sulfapyridine administration due to the potential for falsely elevated results  08/30/2022 126 65 - 140 mg/dL Final     Comment:     If the patient is fasting, the ADA then defines impaired fasting glucose as > 100 mg/dL and diabetes as > or equal to 123 mg/dL  Specimen collection should occur prior to Sulfasalazine administration due to the potential for falsely depressed results  Specimen collection should occur prior to Sulfapyridine administration due to the potential for falsely elevated results  08/04/2022 116 65 - 140 mg/dL Final     Comment:     If the patient is fasting, the ADA then defines impaired fasting glucose as > 100 mg/dL and diabetes as > or equal to 123 mg/dL  Specimen collection should occur prior to Sulfasalazine administration due to the potential for falsely depressed results  Specimen collection should occur prior to Sulfapyridine administration due to the potential for falsely elevated results  08/04/2022 117 65 - 140 mg/dL Final     Comment:     If the patient is fasting, the ADA then defines impaired fasting glucose as > 100 mg/dL and diabetes as > or equal to 123 mg/dL    Specimen collection should occur prior to Sulfasalazine administration due to the potential for falsely depressed results  Specimen collection should occur prior to Sulfapyridine administration due to the potential for falsely elevated results  Calcium   Date Value Ref Range Status   09/07/2022 9 5 8 3 - 10 1 mg/dL Final   08/30/2022 8 9 8 3 - 10 1 mg/dL Final   08/04/2022 9 3 8 3 - 10 1 mg/dL Final   08/04/2022 9 2 8 3 - 10 1 mg/dL Final     Albumin   Date Value Ref Range Status   09/07/2022 3 4 (L) 3 5 - 5 0 g/dL Final   08/30/2022 3 3 (L) 3 5 - 5 0 g/dL Final   08/04/2022 3 5 3 5 - 5 0 g/dL Final     Total Bilirubin   Date Value Ref Range Status   09/07/2022 1 36 (H) 0 20 - 1 00 mg/dL Final     Comment:     Use of this assay is not recommended for patients undergoing treatment with eltrombopag due to the potential for falsely elevated results  08/30/2022 1 58 (H) 0 20 - 1 00 mg/dL Final     Comment:     Use of this assay is not recommended for patients undergoing treatment with eltrombopag due to the potential for falsely elevated results  08/04/2022 7 70 (H) 0 20 - 1 00 mg/dL Final     Comment:     Use of this assay is not recommended for patients undergoing treatment with eltrombopag due to the potential for falsely elevated results  Alkaline Phosphatase   Date Value Ref Range Status   09/07/2022 62 46 - 116 U/L Final   08/30/2022 67 46 - 116 U/L Final   08/04/2022 96 46 - 116 U/L Final     AST   Date Value Ref Range Status   09/07/2022 20 5 - 45 U/L Final     Comment:     Specimen collection should occur prior to Sulfasalazine administration due to the potential for falsely depressed results  08/30/2022 20 5 - 45 U/L Final     Comment:     Specimen collection should occur prior to Sulfasalazine administration due to the potential for falsely depressed results  08/04/2022 65 (H) 5 - 45 U/L Final     Comment:     Specimen collection should occur prior to Sulfasalazine administration due to the potential for falsely depressed results        ALT   Date Value Ref Range Status   09/07/2022 29 12 - 78 U/L Final     Comment:     Specimen collection should occur prior to Sulfasalazine and/or Sulfapyridine administration due to the potential for falsely depressed results  08/30/2022 33 12 - 78 U/L Final     Comment:     Specimen collection should occur prior to Sulfasalazine and/or Sulfapyridine administration due to the potential for falsely depressed results  08/04/2022 51 12 - 78 U/L Final     Comment:     Specimen collection should occur prior to Sulfasalazine and/or Sulfapyridine administration due to the potential for falsely depressed results  LD   Date Value Ref Range Status   07/30/2022 1,077 (H) 81 - 234 U/L Final   12/22/2020 754 (H) 81 - 234 U/L Final   12/20/2020 1,083 (H) 81 - 234 U/L Final     Comment:     15     LDH   Date Value Ref Range Status   03/11/2021 368 (H) 121 - 224 IU/L Final     No results found for: TSH  No results found for: B8TAPRB   No results found for: FREET4      RECENT IMAGING:  Procedure: MRI brain w wo contrast    Result Date: 9/15/2022  Narrative: MRI BRAIN WITH AND WITHOUT CONTRAST INDICATION: C43 71: Malignant melanoma of right lower limb, including hip  COMPARISON:  None  TECHNIQUE: Sagittal T1, axial T2, axial FLAIR, axial T1, axial [place], axial diffusion  Sagittal, axial T1 postcontrast   Axial bravo postcontrast with coronal reconstructions  IV Contrast:  8 mL of Gadobutrol injection (SINGLE-DOSE)  IMAGE QUALITY:   Diagnostic  FINDINGS: BRAIN PARENCHYMA: T2/FLAIR hyperintensity, mildly elevated diffusion, enhancement and mild encephalomalacia in the right occipital lobe consistent with a late subacute early chronic infarct  Few punctate foci of elevated diffusion and T2/FLAIR hyperintensity in the right frontoparietal subcortical and deep white matter could also represent infarcts of similar chronicity  Periventricular and subcortical T-2/FLAIR hyperintense foci, consistent with microangiopathic disease   No intracranial hemorrhage, mass or mass effect  No abnormal parenchymal or extra-axial enhancement VENTRICLES:  No hydrocephalus or extra-axial collection  SELLA AND PITUITARY GLAND:  No sellar enlargement  ORBITS:  No retro-orbital mass  PARANASAL SINUSES:  Opacified, atelectatic right maxillary sinus  VASCULATURE:  Evaluation of the major intracranial vasculature demonstrates appropriate flow voids  CALVARIUM AND SKULL BASE: Bilateral mastoid effusions  No osseous lesion  EXTRACRANIAL SOFT TISSUES:  No soft tissue mass  Impression: 1  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  Punctate subcortical infarcts in the right parietal region of similar chronicity  No mass effect or hemorrhagic conversion  2   No evidence of metastatic disease  The study was marked in EPIC for significant notification  Workstation performed: [id number]             Assessment/Plan  Mr Tamia Redding is a 75 yr male with Stage IIIC melanoma here for follow up  1  Malignant melanoma of leg, right (HCC)  We discussed that his imaging was negative for metastatic disease, so he remains stage IIIC  We discussed he is at high risk for melanoma recurrence or metastasis  We again discussed the principle and reasoning behind adjuvant treatment  We discussed that given his hemolytic anemia and need for treatment with rituximab, treatment with adjuvant pembrolizumab is contraindicated at this time  We also discussed the use of BRAF/MEK inhibitors in the adjuvant setting and the overall reduced RFS and improved PFS in Stage III melanoma treated with dabrafenib and trametinib  He does have a BRAF V600K mutation and given his autoimmune condition I would recommend use of targeted therapy in the adjuvant setting  We did discuss the concern for development of treatment resistance    We discussed the side effects of the combination of the inhibitors which includes but is not limited to rash, migratory arthralgias and myalgias, GI distress, LFT abnormalities, potential for development of SCC, and cardiac toxicity  We discussed that there are a few companies that make these drug combination and that unique side effects included extreme photosensitivity, pyrexia/fevers, and ocular toxicity for each of the different combinations  He remains uncertain of whether not he would like to pursue treatment with adjuvant therapy  I explained that it is his decision  He has all information regarding the medications and he will continue to think about adjuvant treatment  We discussed that regardless of adjuvant treatment, he will require ongoing monitoring and surveillance, both for disease recurrence as well as a new primary melanoma as well as other nonmelanoma skin cancers  This includes physical exam every 3 months for 3 years and then every 6 months until year five, then annually with physical exams and labs, including a comprehensive metabolic panel, CBC with differential and LDH; periodic imaging studies with either CT chest, abdomen and pelvis or PET/CT scans every 6 months  He will return in approximately 1 month for follow-up and determining adjuvant treatment  He knows to call with any issues or concerns prior to his next visit  2  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

## 2022-09-29 ENCOUNTER — APPOINTMENT (OUTPATIENT)
Dept: LAB | Facility: MEDICAL CENTER | Age: 67
End: 2022-09-29
Payer: MEDICARE

## 2022-10-03 ENCOUNTER — TELEPHONE (OUTPATIENT)
Dept: HEMATOLOGY ONCOLOGY | Facility: CLINIC | Age: 67
End: 2022-10-03

## 2022-10-03 NOTE — TELEPHONE ENCOUNTER
Called patient to review recent CBC  Hemoglobin is stable at 13 9  He states he plans to have blood work done this week  We agreed if hemoglobin remains stable we will decrease prednisone to 15 mg p o  once daily

## 2022-10-05 ENCOUNTER — APPOINTMENT (OUTPATIENT)
Dept: LAB | Facility: MEDICAL CENTER | Age: 67
End: 2022-10-05
Payer: MEDICARE

## 2022-10-06 ENCOUNTER — HOSPITAL ENCOUNTER (OUTPATIENT)
Dept: RADIOLOGY | Facility: HOSPITAL | Age: 67
Discharge: HOME | End: 2022-10-06
Attending: RADIOLOGY
Payer: MEDICARE

## 2022-10-06 DIAGNOSIS — I89.8 LYMPHOCELE AFTER SURGICAL PROCEDURE: Primary | ICD-10-CM

## 2022-10-06 DIAGNOSIS — C43.71 MALIGNANT MELANOMA OF LEG, RIGHT (HCC): ICD-10-CM

## 2022-10-06 DIAGNOSIS — T81.89XA LYMPHOCELE AFTER SURGICAL PROCEDURE: Primary | ICD-10-CM

## 2022-10-06 PROCEDURE — 49185 SCLEROTX FLUID COLLECTION: CPT

## 2022-10-06 PROCEDURE — 76080 X-RAY EXAM OF FISTULA: CPT

## 2022-10-06 PROCEDURE — 49185 SCLEROTX FLUID COLLECTION: CPT | Performed by: RADIOLOGY

## 2022-10-06 RX ADMIN — IOPROMIDE 300 MG: 300 INJECTION INTRA-ARTERIAL at 12:27

## 2022-10-06 NOTE — SEDATION DOCUMENTATION
Right inguinal drain check performed and sclerosed with 200mg doxy in 10ml with 5ml lidocaine  Procedure tolerated well by patient, instructions given and questions answered

## 2022-10-10 ENCOUNTER — HOSPITAL ENCOUNTER (OUTPATIENT)
Dept: RADIOLOGY | Facility: HOSPITAL | Age: 67
Discharge: HOME | End: 2022-10-10
Attending: RADIOLOGY | Admitting: RADIOLOGY
Payer: MEDICARE

## 2022-10-10 DIAGNOSIS — I89.8 LYMPHOCELE AFTER SURGICAL PROCEDURE: ICD-10-CM

## 2022-10-10 DIAGNOSIS — T81.89XA LYMPHOCELE AFTER SURGICAL PROCEDURE: ICD-10-CM

## 2022-10-10 PROCEDURE — 49185 SCLEROTX FLUID COLLECTION: CPT

## 2022-10-10 PROCEDURE — 49424 ASSESS CYST CONTRAST INJECT: CPT

## 2022-10-10 PROCEDURE — 49185 SCLEROTX FLUID COLLECTION: CPT | Performed by: PHYSICIAN ASSISTANT

## 2022-10-10 RX ORDER — LIDOCAINE HYDROCHLORIDE 10 MG/ML
INJECTION, SOLUTION EPIDURAL; INFILTRATION; INTRACAUDAL; PERINEURAL CODE/TRAUMA/SEDATION MEDICATION
Status: COMPLETED | OUTPATIENT
Start: 2022-10-10 | End: 2022-10-10

## 2022-10-10 RX ADMIN — IOPROMIDE 300 MG: 300 INJECTION INTRA-ARTERIAL at 13:32

## 2022-10-10 RX ADMIN — LIDOCAINE HYDROCHLORIDE 5 ML: 10 INJECTION, SOLUTION EPIDURAL; INFILTRATION; INTRACAUDAL; PERINEURAL at 13:31

## 2022-10-10 NOTE — SEDATION DOCUMENTATION
Sclerosis of right groin lymphocele drain completed by Paco Stevenson PA-C  Pt aware to keep drain capped for 2 hours at home and then uncap and attach to bulb suction  Declined printed AVS  Education provided, understanding verbalized

## 2022-10-10 NOTE — BRIEF OP NOTE (RAD/CATH)
IR DRAINAGE TUBE CHECK WITH SCLEROSIS Procedure Note    PATIENT NAME: Subhash Dow  : 1955  MRN: [id number]    Pre-op Diagnosis:   1  Lymphocele after surgical procedure      Post-op Diagnosis:   1  Lymphocele after surgical procedure        Provider:  Trini Souza    No qualified resident was available, Resident is only observing    Estimated Blood Loss: none    Findings: continued 200cc serous output daily per patient  Approximately 15cc contrast injected to fill cavity  11cc 400mg of doxycycline mixed with 4cc 1% lidocaine and was injected into cavity  Catheter to be capped for 2 hours and connected to bulb drainage       Specimens: none    Complications:  None immediate    Anesthesia: none    Trini Souza     Date: 10/10/2022  Time: 2:43 PM

## 2022-10-10 NOTE — DISCHARGE INSTRUCTIONS
TUBE CARE INSTRUCTIONS    Care after your procedure:    Resume your normal diet  Small sips of flat soda will help with nausea  1  The properly functioning catheter should be forward flushed once (1x) daily with 10ml of normal saline using clean technique  You will be given a prescription for flushes  To flush the tube, clean both connections with alcohol swab  Twist off the drainage bag/ bulb  tubing and twist the saline syringe into the drainage tube and flush  Remove the syringe and twist the drainage bag / bulb tubing tubing back on     2  The drainage bag/bulb may be emptied as necessary  Keep a record of the amount of fluid you drain from your tube  This should be done with clean technique as well  3  A fresh dressing should be applied daily over the tube insertion site  4  As the tube is secured to the skin with only a suture,try not to pull on your tube  Tub baths are not permitted  Showers are permitted if the patient's skin entry site is prevented from getting wet  Similarly, washcloth "baths" are acceptable  Contact Interventional Radiology at [phone] Viktoriya PATIENTS: Contact Interventional Radiology at [phone]) Jesi Bernard PATIENTS: Contact Interventional Radiology at [phone]) if:    1  Leakage or large amounts of liquid around the catheter  2  Fever of 101 degrees lasting several hours without other obvious cause (such as sore throat, flu, etc)  3  Persistent nausea or vomiting  4  Diminished drainage, which may be associated with pressure or pain  Or when the     drainage from your tube is less than 10mls for 48 hours  5  Catheter pulled back or falls out  The following pharmacies carry the flush syringes         [facility]                     [facility]  [address] [facility]                         [address] [facility] [place]  Phone [phone]            Phone [phone]   [place] [address]                                [phone]  [address] [facility] Aniya THOMAS                      [address]  Phone [phone]            Phone [phone]                      Anna Gage                                                                                                          [phone]  [facility]  [address]  [address] [facility]  Phone [phone] Acute Care Surgery  Consultation      History of Present Illness   HPI:  Maile Chatterjee is a 75 y o  male who presents with ***  Review of Systems    Historical Information   Past Medical History:   Diagnosis Date    Anemia 11/2/2016    Anxiety     Arthritis     Autoimmune hemolytic anemia ([facility] [address] )     Claustrophobia     COVID 12/2020    DVT (deep venous thrombosis) (HCC)     GERD (gastroesophageal reflux disease)     Hearing loss, right     Hemolytic anemia ([facility] [address] )     History of transfusion     2018 - no adverse reaction    Hypertension     Malignant melanoma of leg, right (HCC) 7/1/2022    Palpitation     Portal vein thrombosis     PTSD (post-traumatic stress disorder)     Pulmonary emboli (HCC)     Tobacco abuse            Physical Exam    Lab Results: {SL IP GEN REMBERTO LABS:82725}  Imaging: {Results Review Statement:91297}  EKG, Pathology, and Other Studies: {Results Review Statement:30294}    Counseling / Coordination of Care  Total floor / unit time spent today *** minutes  Greater than 50% of total time was spent with the patient and / or family counseling and / or coordination of care      Anuj Cruz  10/10/2022 1:40 PM

## 2022-10-13 ENCOUNTER — APPOINTMENT (OUTPATIENT)
Dept: LAB | Facility: MEDICAL CENTER | Age: 67
End: 2022-10-13
Payer: MEDICARE

## 2022-10-19 ENCOUNTER — TELEPHONE (OUTPATIENT)
Dept: HEMATOLOGY ONCOLOGY | Facility: CLINIC | Age: 67
End: 2022-10-19

## 2022-10-19 DIAGNOSIS — E80.6 HYPERBILIRUBINEMIA: ICD-10-CM

## 2022-10-19 DIAGNOSIS — D59.11 HEMOLYTIC ANEMIA DUE TO WARM ANTIBODY (HCC): ICD-10-CM

## 2022-10-19 DIAGNOSIS — C43.71 MALIGNANT MELANOMA OF LEG, RIGHT (HCC): ICD-10-CM

## 2022-10-19 DIAGNOSIS — D59.10 AUTOIMMUNE HEMOLYTIC ANEMIA (HCC): Primary | ICD-10-CM

## 2022-10-19 DIAGNOSIS — D75.839 THROMBOCYTOSIS: ICD-10-CM

## 2022-10-19 DIAGNOSIS — I26.09 OTHER PULMONARY EMBOLISM WITH ACUTE COR PULMONALE, UNSPECIFIED CHRONICITY (HCC): ICD-10-CM

## 2022-10-19 NOTE — TELEPHONE ENCOUNTER
Patient will have labs completed on 10/21, follow up r/s to 10/24  Patient verbalized understanding and agreement

## 2022-10-20 ENCOUNTER — HOSPITAL ENCOUNTER (OUTPATIENT)
Dept: RADIOLOGY | Facility: HOSPITAL | Age: 67
Discharge: HOME | End: 2022-10-20
Payer: MEDICARE

## 2022-10-20 DIAGNOSIS — T81.89XA LYMPHOCELE AFTER SURGICAL PROCEDURE: Primary | ICD-10-CM

## 2022-10-20 DIAGNOSIS — I89.8 LYMPHOCELE AFTER SURGICAL PROCEDURE: Primary | ICD-10-CM

## 2022-10-20 PROCEDURE — 76080 X-RAY EXAM OF FISTULA: CPT

## 2022-10-20 PROCEDURE — 49185 SCLEROTX FLUID COLLECTION: CPT | Performed by: INTERNAL MEDICINE

## 2022-10-20 PROCEDURE — 49424 ASSESS CYST CONTRAST INJECT: CPT

## 2022-10-20 PROCEDURE — 49185 SCLEROTX FLUID COLLECTION: CPT

## 2022-10-20 RX ADMIN — IOHEXOL 10 ML: 350 INJECTION, SOLUTION INTRAVENOUS at 16:15

## 2022-10-20 NOTE — DISCHARGE INSTRUCTIONS
TUBE CARE INSTRUCTIONS    Care after your procedure:    Resume your normal diet  Small sips of flat soda will help with nausea  1  The properly functioning catheter should be forward flushed once (1x) daily with 10ml of normal saline using clean technique  You will be given a prescription for flushes  To flush the tube, clean both connections with alcohol swab  Twist off the drainage bag/ bulb  tubing and twist the saline syringe into the drainage tube and flush  Remove the syringe and twist the drainage bag / bulb tubing tubing back on     2  The drainage bag/bulb may be emptied as necessary  Keep a record of the amount of fluid you drain from your tube  This should be done with clean technique as well  3  A fresh dressing should be applied daily over the tube insertion site  4  As the tube is secured to the skin with only a suture,try not to pull on your tube  Tub baths are not permitted  Showers are permitted if the patient's skin entry site is prevented from getting wet  Similarly, washcloth "baths" are acceptable  Contact Interventional Radiology at [phone] Viktoriya PATIENTS: Contact Interventional Radiology at [phone]) Jules Craig PATIENTS: Contact Interventional Radiology at [phone]) if:    1  Leakage or large amounts of liquid around the catheter  2  Fever of 101 degrees lasting several hours without other obvious cause (such as sore throat, flu, etc)  3  Persistent nausea or vomiting  4  Diminished drainage, which may be associated with pressure or pain  Or when the     drainage from your tube is less than 10mls for 48 hours  5  Catheter pulled back or falls out  The following pharmacies carry the flush syringes         [facility]                     [facility]  [address] [facility]                         [address] [facility] [place]  Phone [phone]            Phone [phone]   [place] [address]                                [phone]  [address] [facility] [address] THOMAS                      [address] [facility]  Phone [phone]            Phone [phone]                      Marisa Tuttle                                                                                                          [phone]  [facility]  [facility] [address]    [address] [facility]  Phone [phone]

## 2022-10-20 NOTE — SEDATION DOCUMENTATION
Right pelvic drain tube check completed  Sclerosed with 10 ml Dehydrated alcohol  Denies pain  Alcohol to dwell x 2 hours  Continue flushing with 5 ml NSS daily return in 1 week as per Dr Shai King  Discharged home ambulatory

## 2022-10-21 ENCOUNTER — APPOINTMENT (OUTPATIENT)
Dept: LAB | Facility: MEDICAL CENTER | Age: 67
End: 2022-10-21
Payer: MEDICARE

## 2022-10-21 DIAGNOSIS — D59.11 HEMOLYTIC ANEMIA DUE TO WARM ANTIBODY (HCC): ICD-10-CM

## 2022-10-21 DIAGNOSIS — D75.839 THROMBOCYTOSIS: ICD-10-CM

## 2022-10-21 DIAGNOSIS — E80.6 HYPERBILIRUBINEMIA: ICD-10-CM

## 2022-10-21 DIAGNOSIS — I26.09 OTHER PULMONARY EMBOLISM WITH ACUTE COR PULMONALE, UNSPECIFIED CHRONICITY (HCC): ICD-10-CM

## 2022-10-21 DIAGNOSIS — C43.71 MALIGNANT MELANOMA OF LEG, RIGHT (HCC): ICD-10-CM

## 2022-10-21 DIAGNOSIS — D59.10 AUTOIMMUNE HEMOLYTIC ANEMIA (HCC): ICD-10-CM

## 2022-10-21 LAB
ALBUMIN SERPL BCP-MCNC: 3.2 G/DL (ref 3.5–5)
ALP SERPL-CCNC: 54 U/L (ref 46–116)
ALT SERPL W P-5'-P-CCNC: 34 U/L (ref 12–78)
ANION GAP SERPL CALCULATED.3IONS-SCNC: 10 MMOL/L (ref 4–13)
AST SERPL W P-5'-P-CCNC: 20 U/L (ref 5–45)
BASOPHILS # BLD AUTO: 0.09 THOUSANDS/ÂΜL (ref 0–0.1)
BASOPHILS NFR BLD AUTO: 1 % (ref 0–1)
BILIRUB SERPL-MCNC: 0.47 MG/DL (ref 0.2–1)
BUN SERPL-MCNC: 23 MG/DL (ref 5–25)
CALCIUM ALBUM COR SERPL-MCNC: 10.1 MG/DL (ref 8.3–10.1)
CALCIUM SERPL-MCNC: 9.5 MG/DL (ref 8.3–10.1)
CHLORIDE SERPL-SCNC: 108 MMOL/L (ref 96–108)
CO2 SERPL-SCNC: 25 MMOL/L (ref 21–32)
CREAT SERPL-MCNC: 1.16 MG/DL (ref 0.6–1.3)
EOSINOPHIL # BLD AUTO: 0.16 THOUSAND/ÂΜL (ref 0–0.61)
EOSINOPHIL NFR BLD AUTO: 1 % (ref 0–6)
ERYTHROCYTE [DISTWIDTH] IN BLOOD BY AUTOMATED COUNT: 12.5 % (ref 11.6–15.1)
GFR SERPL CREATININE-BSD FRML MDRD: 64 ML/MIN/1.73SQ M
GLUCOSE P FAST SERPL-MCNC: 117 MG/DL (ref 65–99)
HCT VFR BLD AUTO: 44.1 % (ref 36.5–49.3)
HGB BLD-MCNC: 14.5 G/DL (ref 12–17)
IMM GRANULOCYTES # BLD AUTO: 0.07 THOUSAND/UL (ref 0–0.2)
IMM GRANULOCYTES NFR BLD AUTO: 1 % (ref 0–2)
LDH SERPL-CCNC: 236 U/L (ref 81–234)
LYMPHOCYTES # BLD AUTO: 3.36 THOUSANDS/ÂΜL (ref 0.6–4.47)
LYMPHOCYTES NFR BLD AUTO: 25 % (ref 14–44)
MCH RBC QN AUTO: 37.1 PG (ref 26.8–34.3)
MCHC RBC AUTO-ENTMCNC: 32.9 G/DL (ref 31.4–37.4)
MCV RBC AUTO: 113 FL (ref 82–98)
MONOCYTES # BLD AUTO: 0.96 THOUSAND/ÂΜL (ref 0.17–1.22)
MONOCYTES NFR BLD AUTO: 7 % (ref 4–12)
NEUTROPHILS # BLD AUTO: 8.67 THOUSANDS/ÂΜL (ref 1.85–7.62)
NEUTS SEG NFR BLD AUTO: 65 % (ref 43–75)
NRBC BLD AUTO-RTO: 0 /100 WBCS
PLATELET # BLD AUTO: 528 THOUSANDS/UL (ref 149–390)
PMV BLD AUTO: 10.1 FL (ref 8.9–12.7)
POTASSIUM SERPL-SCNC: 3 MMOL/L (ref 3.5–5.3)
PROT SERPL-MCNC: 6.2 G/DL (ref 6.4–8.4)
RBC # BLD AUTO: 3.91 MILLION/UL (ref 3.88–5.62)
SODIUM SERPL-SCNC: 143 MMOL/L (ref 135–147)
WBC # BLD AUTO: 13.31 THOUSAND/UL (ref 4.31–10.16)

## 2022-10-21 PROCEDURE — 80053 COMPREHEN METABOLIC PANEL: CPT

## 2022-10-21 PROCEDURE — 36415 COLL VENOUS BLD VENIPUNCTURE: CPT

## 2022-10-21 PROCEDURE — 83615 LACTATE (LD) (LDH) ENZYME: CPT

## 2022-10-21 PROCEDURE — 85025 COMPLETE CBC W/AUTO DIFF WBC: CPT

## 2022-10-21 NOTE — BRIEF OP NOTE (RAD/CATH)
INTERVENTIONAL RADIOLOGY PROCEDURE NOTE    Date: 10/21/2022    Procedure: IR DRAINAGE TUBE CHECK WITH SCLEROSIS    Preoperative diagnosis:   1  Lymphocele after surgical procedure         Postoperative diagnosis: Same  Surgeon: Yamil Pederson     Assistant: None  No qualified resident was available  Blood loss: None    Specimens: None     Findings: Similar size of fluid collection at the right groin  Repeat sclerosis was performed with 10 mL of dehydrated alcohol  We will plan for tube check in 1 week  Complications: None immediate      Anesthesia: none

## 2022-10-24 ENCOUNTER — OFFICE VISIT (OUTPATIENT)
Dept: HEMATOLOGY ONCOLOGY | Facility: CLINIC | Age: 67
End: 2022-10-24
Payer: MEDICARE

## 2022-10-24 VITALS
SYSTOLIC BLOOD PRESSURE: 130 MMHG | WEIGHT: 198.5 LBS | BODY MASS INDEX: 31.16 KG/M2 | DIASTOLIC BLOOD PRESSURE: 70 MMHG | RESPIRATION RATE: 18 BRPM | TEMPERATURE: 97.6 F | HEIGHT: 67 IN | OXYGEN SATURATION: 97 % | HEART RATE: 86 BPM

## 2022-10-24 VITALS
OXYGEN SATURATION: 99 % | SYSTOLIC BLOOD PRESSURE: 142 MMHG | BODY MASS INDEX: 31.08 KG/M2 | DIASTOLIC BLOOD PRESSURE: 80 MMHG | HEIGHT: 67 IN | WEIGHT: 198 LBS | RESPIRATION RATE: 18 BRPM | TEMPERATURE: 97.5 F | HEART RATE: 92 BPM

## 2022-10-24 DIAGNOSIS — D59.10 AUTOIMMUNE HEMOLYTIC ANEMIA (HCC): Primary | ICD-10-CM

## 2022-10-24 DIAGNOSIS — C43.71 MALIGNANT MELANOMA OF RIGHT LOWER EXTREMITY INCLUDING HIP (HCC): ICD-10-CM

## 2022-10-24 DIAGNOSIS — Z79.899 HIGH RISK MEDICATION USE: ICD-10-CM

## 2022-10-24 DIAGNOSIS — C43.71 MALIGNANT MELANOMA OF LEG, RIGHT (HCC): Primary | ICD-10-CM

## 2022-10-24 PROCEDURE — 99214 OFFICE O/P EST MOD 30 MIN: CPT | Performed by: INTERNAL MEDICINE

## 2022-10-24 NOTE — PROGRESS NOTES
[facility]  [address] [address]  [address] [place],1955, [phone]  10/24/22    Discussion:   In summary, this is a 57-year-old male with a history of autoimmune hemolytic anemia on prednisone chronically  He has been able to taper prednisone down to approximately 15 mg p o  daily  Hemoglobin is fluctuating about the lower end of the reference range  Generally feels well  He has right leg edema since surgery for melanoma  He has a drain draining approximately 500 cc per day in the right groin  Under monitoring by IR  Initiation of adjuvant medical therapy is planned  I encouraged this  I discussed the above with the patient  The patient  voiced understanding and agreement   ______________________________________________________________________    Chief Complaint   Patient presents with   • Follow-up       HPI:  Oncology History   Malignant melanoma of leg, right ([facility] [address] )   5/31/2022 Biopsy    Right Leg, Shave Biopsy   Melanoma extending to the deep specimen margin  Thickness: 7 0mm  Ulceration: not seen   Mitoses: 3      5/31/2022 -  Cancer Staged    Staging form: Melanoma of the Skin, AJCC 8th Edition  - Clinical stage from 5/31/2022: Stage IIB (cT4a, cN0, cM0) - Signed by Maty Wong MD on 8/25/2022 7/1/2022 Initial Diagnosis    Malignant melanoma of leg, right ([facility] [address] )     7/29/2022 Surgery    A  Lymph node, sentinel, #1:  One lymph node with rare metastatic melanoma cells (1/1), subcapsular location  Immunohistochemical study for MART-1, HMB45 and S100 supports the findings  B  Leg, right, knee, wide excision:  Residual melanoma, nodular type, and scar; margins free  See synoptic report       7/29/2022 -  Cancer Staged    Staging form: Melanoma of the Skin, AJCC 8th Edition  - Pathologic stage from 7/29/2022: Stage IIIC (pT4a, pN1a, cM0) - Signed by Maty Wong MD on 8/25/2022           Interval History:  Clinically stable  ECOG-  1 - Symptomatic but completely ambulatory    Review of Systems   Constitutional: Negative for chills and fever  HENT: Negative for nosebleeds  Eyes: Negative for discharge  Respiratory: Negative for cough and shortness of breath  Cardiovascular: Negative for chest pain  Gastrointestinal: Negative for abdominal pain, constipation and diarrhea  Endocrine: Negative for polydipsia  Genitourinary: Negative for hematuria  Musculoskeletal: Negative for arthralgias  Skin: Negative for color change  Allergic/Immunologic: Negative for immunocompromised state  Neurological: Negative for dizziness and headaches  Hematological: Negative for adenopathy  Psychiatric/Behavioral: Negative for agitation         Past Medical History:   Diagnosis Date   • Anemia 11/2/2016   • Anxiety    • Arthritis    • Autoimmune hemolytic anemia ([facility] [address] )    • Claustrophobia    • COVID 12/2020   • DVT (deep venous thrombosis) (HCC)    • GERD (gastroesophageal reflux disease)    • Hearing loss, right    • Hemolytic anemia (HCC)    • History of transfusion     2018 - no adverse reaction   • Hypertension    • Malignant melanoma of leg, right (HCC) 7/1/2022   • Palpitation    • Portal vein thrombosis    • PTSD (post-traumatic stress disorder)    • Pulmonary emboli (HCC)    • Tobacco abuse      Patient Active Problem List   Diagnosis   • 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   • Hyperbilirubinemia   • Symptomatic anemia   • Pulmonary embolism with acute cor pulmonale (HCC)   • Portal vein thrombosis   • Elevated blood pressure reading without diagnosis of hypertension   • Shortness of breath   • Gastroesophageal reflux disease   • Autoimmune hemolytic anemia   • ARABELLA (acute kidney injury) ([facility] [address] )   • Splenomegaly   • Iron overload due to repeated red blood cell transfusions   • Posttraumatic stress disorder   • Essential hypertension   • Glucose intolerance (impaired glucose tolerance)   • Renal insufficiency   • Auto immune neutropenia (HCC)   • Primary osteoarthritis of left knee   • Pain in joint, lower leg   • Pain in left knee   • COVID-19   • Thrombocytosis   • Primary localized osteoarthrosis of the knee, right   • Hyperglycemia   • Chronic bilateral low back pain with bilateral sciatica   • Hypercholesterolemia   • Malignant melanoma of leg, right (HCC)   • Dyspnea   • Acute respiratory failure with hypoxia (HCC)   • Elevated serum creatinine   • Elevated bilirubin   • Leukocytosis   • Lymphocele after surgical procedure       Current Outpatient Medications:   •  acetaminophen (TYLENOL) 325 mg tablet, Take 2 tablets (650 mg total) by mouth every 6 (six) hours as needed for mild pain, Disp:  , Rfl: 0  •  ALPRAZolam (XANAX) 0 5 mg tablet, Take 1 tablet (0 5 mg total) by mouth 2 (two) times a day Take one two hours before scan and second one 30 min before, Disp: 2 tablet, Rfl: 0  •  benzonatate (TESSALON PERLES) 100 mg capsule, Take 1 capsule (100 mg total) by mouth 3 (three) times a day, Disp: 20 capsule, Rfl: 0  •  dabigatran etexilate (PRADAXA) 150 mg capsu, Take 1 capsule (150 mg total) by mouth every 12 (twelve) hours, Disp: 60 capsule, Rfl: 0  •  furosemide (LASIX) 20 mg tablet, Take 1 tablet (20 mg total) by mouth daily, Disp: 5 tablet, Rfl: 0  •  hydrochlorothiazide (HYDRODIURIL) 25 mg tablet, Take 1 tablet (25 mg total) by mouth daily (Patient taking differently: Take 25 mg by mouth every morning), Disp: 30 tablet, Rfl: 5  •  metoprolol succinate (TOPROL-XL) 25 mg 24 hr tablet, Take 0 5 tablets (12 5 mg total) by mouth daily (Patient taking differently: Take 12 5 mg by mouth every morning), Disp: 30 tablet, Rfl: 5  •  pantoprazole (PROTONIX) 40 mg tablet, Take 1 tablet (40 mg total) by mouth daily (Patient taking differently: Take 40 mg by mouth every morning), Disp: 90 tablet, Rfl: 3  •  potassium chloride (K-DUR,KLOR-CON) 20 mEq tablet, Take 1 tablet (20 mEq total) by mouth daily (Patient taking differently: Take 20 mEq by mouth every morning), Disp: 30 tablet, Rfl: 3  •  prazosin (MINIPRESS) 1 mg capsule, Take 1 mg by mouth daily at bedtime , Disp: , Rfl:   •  predniSONE 20 mg tablet, Take 2 tablets (40 mg total) by mouth daily, Disp: 60 tablet, Rfl: 0  •  sodium chloride, PF, 0 9 %, 10 mL by Intracatheter route daily Intracatheter flushing daily   May substitute prefilled syringe with normal saline 10 mL vials, 10 mL syringes, and 18 g blunt needles, Disp: 300 mL, Rfl: 0  •  sulfamethoxazole-trimethoprim (BACTRIM DS) 800-160 mg per tablet, Take 1 tablet by mouth 3 (three) times a week Monday, Wednesday and Friday, Disp: 12 tablet, Rfl: 0  •  VITAMIN D PO, Take 1,000 Units by mouth daily , Disp: , Rfl:   •  ascorbic acid (VITAMIN C) 1000 MG tablet, Take 1 tablet (1,000 mg total) by mouth every 12 (twelve) hours for 6 doses (Patient taking differently: Take 1,000 mg by mouth daily Every other day), Disp: 6 tablet, Rfl: 0  No Known Allergies  Past Surgical History:   Procedure Laterality Date   • CATARACT EXTRACTION Bilateral    • CHOLECYSTECTOMY  7/18/2017   • COLONOSCOPY     • ELBOW ARTHROPLASTY Left     bursectomy   • IR DRAINAGE TUBE CHECK WITH SCLEROSIS  10/6/2022   • IR DRAINAGE TUBE CHECK WITH SCLEROSIS  10/10/2022   • IR DRAINAGE TUBE CHECK WITH SCLEROSIS  10/20/2022   • IR DRAINAGE TUBE PLACEMENT  9/19/2022   • JOINT REPLACEMENT Right 2/2/2021    knee   • KNEE SURGERY Right     meniscus tear   • LYMPH NODE BIOPSY Right 7/29/2022    Procedure: BIOPSY LYMPH NODE SENTINEL;  Surgeon: Wilton Kennedy MD;  Location: [facility];  Service: Surgical Oncology   • GA LAP,CHOLECYSTECTOMY/GRAPH N/A 12/23/2017    Procedure: CHOLECYSTECTOMY LAPAROSCOPIC with cholangiogram;  Surgeon: Charo Corcoran MD;  Location[facility];  Service: General   • GA REMOVAL SPLEEN, TOTAL N/A 5/18/2017    Procedure: LAPAROSCOPIC HAND ASSIST SPLENECTOMY;  Surgeon: Fernando Moreno MD; Location: [facility];  Service: Surgical Oncology   • AZ TOTAL KNEE ARTHROPLASTY Right 2/2/2021    Procedure: ARTHROPLASTY KNEE TOTAL;  Surgeon: Danika Nguyễn MD;  [facility];  Service: Orthopedics   • AZ TOTAL KNEE ARTHROPLASTY Left 11/17/2021    Procedure: TOTAL KNEE REPLACEMENT;  Surgeon: Danika Nguyễn MD;  [facility];  Service: Orthopedics   • SHOULDER SURGERY Left     rotator cuff x4, reconstruction   • SKIN LESION EXCISION Right 7/29/2022    Procedure: WIDE EXCISION RIGHT MEDIAL THIGH;  Surgeon: Marti Benitez MD;  Location: [facility];  Service: Surgical Oncology     Social History     Objective:  Vitals:    10/24/22 1358   BP: 142/80   BP Location: Left arm   Patient Position: Sitting   Cuff Size: Adult   Pulse: 92   Resp: 18   Temp: 97 5 °F (36 4 °C)   SpO2: 99%   Weight: 89 8 kg (198 lb)   Height: 5' 7" (1 702 m)     Physical Exam  Constitutional:       Appearance: He is well-developed  HENT:      Head: Normocephalic and atraumatic  Eyes:      Pupils: Pupils are equal, round, and reactive to light  Cardiovascular:      Rate and Rhythm: Normal rate and regular rhythm  Heart sounds: No murmur heard  Pulmonary:      Breath sounds: Normal breath sounds  No wheezing or rales  Abdominal:      Palpations: Abdomen is soft  Tenderness: There is no abdominal tenderness  Musculoskeletal:         General: No tenderness  Normal range of motion  Cervical back: Neck supple  Lymphadenopathy:      Cervical: No cervical adenopathy  Skin:     Findings: No erythema or rash  Neurological:      Mental Status: He is alert and oriented to person, place, and time  Cranial Nerves: No cranial nerve deficit  Deep Tendon Reflexes: Reflexes are normal and symmetric  Psychiatric:         Behavior: Behavior normal            Labs: I personally reviewed the labs and imaging pertinent to this patient care

## 2022-10-24 NOTE — LETTER
October 24, 2022     Rita Earl, [address] [facility]  [address] [address]    Patient: Jj Mendez   YOB: 1955   Date of Visit: 10/24/2022       Dear Dr Neo Ziegler: Thank you for referring Kee Haq to me for evaluation  Below are my notes for this consultation  If you have questions, please do not hesitate to call me  I look forward to following your patient along with you  Sincerely,        Rama Renner MD        CC: Roxana Bae, MD Cata Russell, MD Rama Garcia MD  10/24/2022  1:03 PM  Sign when Signing Visit  [address] [place] [address] 58  [phone]  [phone]     Date of Visit: 10/24/2022  Name: Jj Mendez   YOB: 1955        Subjective    VISIT DIAGNOSIS:  Diagnoses and all orders for this visit:    Malignant melanoma of leg, right ([facility] )  -     Echo complete w/ contrast if indicated; Future  -     ECG 12 lead; Future  -     CBC and differential; Future  -     Comprehensive metabolic panel; Future  -     LD,Blood; Future  -     T3, free; Future  -     T4, free; Future  -     TSH, 3rd generation; Future  -     ECG 12 lead; Future    Malignant melanoma of right lower extremity including hip ([facility] )  -     Echo complete w/ contrast if indicated; Future  -     ECG 12 lead; Future  -     CBC and differential; Future  -     Comprehensive metabolic panel; Future  -     LD,Blood; Future  -     T3, free; Future  -     T4, free; Future  -     TSH, 3rd generation; Future  -     ECG 12 lead; Future    High risk medication use  -     Echo complete w/ contrast if indicated; Future  -     ECG 12 lead; Future  -     CBC and differential; Future  -     Comprehensive metabolic panel; Future  -     LD,Blood; Future  -     T3, free; Future  -     T4, free; Future  -     TSH, 3rd generation; Future  -     ECG 12 lead;  Future        Oncology History Malignant melanoma of leg, right ([facility] )   5/31/2022 Biopsy    Right Leg, Shave Biopsy   Melanoma extending to the deep specimen margin  Thickness: 7 0mm  Ulceration: not seen   Mitoses: 3      5/31/2022 -  Cancer Staged    Staging form: Melanoma of the Skin, AJCC 8th Edition  - Clinical stage from 5/31/2022: Stage IIB (cT4a, cN0, cM0) - Signed by Celso Pineda MD on 8/25/2022 7/1/2022 Initial Diagnosis    Malignant melanoma of leg, right ([facility] )     7/29/2022 Surgery    A  Lymph node, sentinel, #1:  One lymph node with rare metastatic melanoma cells (1/1), subcapsular location  Immunohistochemical study for MART-1, HMB45 and S100 supports the findings  B  Leg, right, knee, wide excision:  Residual melanoma, nodular type, and scar; margins free  See synoptic report       7/29/2022 -  Cancer Staged    Staging form: Melanoma of the Skin, AJCC 8th Edition  - Pathologic stage from 7/29/2022: Stage IIIC (pT4a, pN1a, cM0) - Signed by Celso Pineda MD on 8/25/2022          Cancer Staging  Malignant melanoma of leg, right [facility])  Staging form: Melanoma of the Skin, AJCC 8th Edition  - Clinical stage from 5/31/2022: Stage IIB (cT4a, cN0, cM0) - Signed by Celso Pineda MD on 8/25/2022  - Pathologic stage from 7/29/2022: Stage IIIC (pT4a, pN1a, cM0) - Signed by Celso Pineda MD on 8/25/2022     Treatment Details   Treatment goal [No plan goal]   Plan Name ONE-TIME BLOOD PRODUCT TRANSFUSION PLAN   Status Active   Start Date 7/15/2022   End Date Until discontinued   Provider VISHNU Trevino   Chemotherapy [No matching medication found in this treatment plan]     Treatment Details   Treatment goal Palliative   Plan Name OP RiTUXimab weekly x 4, every 6 months   Status Active   Start Date 8/1/2022   End Date 8/23/2022   Provider Aldo Mejía DO   Chemotherapy riTUXimab (RITUXAN) subsequent titrated chemo infusion, 375 mg/m2 = 746 2 mg, Intravenous, Once, 1 of 1 cycle  Administration: 746 2 mg (8/9/2022), 746 2 mg (8/16/2022), 746 2 mg (8/23/2022)    riTUXimab (RITUXAN) first titrated chemo infusion, 375 mg/m2 = 746 2 mg, Intravenous, Once, 1 of 1 cycle  Administration: 746 2 mg (8/1/2022)          HISTORY OF PRESENT ILLNESS: Vianey Dawn is a 75 y o  male  who has stage IIIC melanoma here for follow-up  He is doing okay continues to have the drain in his right groin  They continue with episodes of sclerosing  Otherwise is the primary site on his right leg has healed nicely  He denies any new, changing, concerning skin lesions  He denies any new lymphadenopathy  He has decided to proceed with treatment with adjuvant therapy with encorafenib and binimetinib  REVIEW OF SYSTEMS:  Review of Systems   Constitutional: Negative for appetite change, fatigue, fever and unexpected weight change  HENT:   Negative for lump/mass  Eyes: Negative for icterus  Respiratory: Negative for cough, shortness of breath and wheezing  Cardiovascular: Negative for leg swelling (imrpoved RLE edema)  Gastrointestinal: Negative for abdominal pain, constipation, diarrhea, nausea and vomiting  Genitourinary: Negative for difficulty urinating and hematuria  Musculoskeletal: Negative for arthralgias, gait problem and myalgias  Skin: Negative for itching and rash  No new, changing, or concerning lesions  Neurological: Negative for extremity weakness, gait problem, headaches, light-headedness and numbness  Hematological: Negative for adenopathy          MEDICATIONS:    Current Outpatient Medications:   •  acetaminophen (TYLENOL) 325 mg tablet, Take 2 tablets (650 mg total) by mouth every 6 (six) hours as needed for mild pain, Disp:  , Rfl: 0  •  ALPRAZolam (XANAX) 0 5 mg tablet, Take 1 tablet (0 5 mg total) by mouth 2 (two) times a day Take one two hours before scan and second one 30 min before, Disp: 2 tablet, Rfl: 0  •  benzonatate (TESSALON PERLES) 100 mg capsule, Take 1 capsule (100 mg total) by mouth 3 (three) times a day, Disp: 20 capsule, Rfl: 0  •  dabigatran etexilate (PRADAXA) 150 mg capsu, Take 1 capsule (150 mg total) by mouth every 12 (twelve) hours, Disp: 60 capsule, Rfl: 0  •  furosemide (LASIX) 20 mg tablet, Take 1 tablet (20 mg total) by mouth daily, Disp: 5 tablet, Rfl: 0  •  hydrochlorothiazide (HYDRODIURIL) 25 mg tablet, Take 1 tablet (25 mg total) by mouth daily (Patient taking differently: Take 25 mg by mouth every morning), Disp: 30 tablet, Rfl: 5  •  metoprolol succinate (TOPROL-XL) 25 mg 24 hr tablet, Take 0 5 tablets (12 5 mg total) by mouth daily (Patient taking differently: Take 12 5 mg by mouth every morning), Disp: 30 tablet, Rfl: 5  •  pantoprazole (PROTONIX) 40 mg tablet, Take 1 tablet (40 mg total) by mouth daily (Patient taking differently: Take 40 mg by mouth every morning), Disp: 90 tablet, Rfl: 3  •  potassium chloride (K-DUR,KLOR-CON) 20 mEq tablet, Take 1 tablet (20 mEq total) by mouth daily (Patient taking differently: Take 20 mEq by mouth every morning), Disp: 30 tablet, Rfl: 3  •  prazosin (MINIPRESS) 1 mg capsule, Take 1 mg by mouth daily at bedtime , Disp: , Rfl:   •  predniSONE 20 mg tablet, Take 2 tablets (40 mg total) by mouth daily, Disp: 60 tablet, Rfl: 0  •  sodium chloride, PF, 0 9 %, 10 mL by Intracatheter route daily Intracatheter flushing daily   May substitute prefilled syringe with normal saline 10 mL vials, 10 mL syringes, and 18 g blunt needles, Disp: 300 mL, Rfl: 0  •  sulfamethoxazole-trimethoprim (BACTRIM DS) 800-160 mg per tablet, Take 1 tablet by mouth 3 (three) times a week Monday, Wednesday and Friday, Disp: 12 tablet, Rfl: 0  •  VITAMIN D PO, Take 1,000 Units by mouth daily , Disp: , Rfl:   •  ascorbic acid (VITAMIN C) 1000 MG tablet, Take 1 tablet (1,000 mg total) by mouth every 12 (twelve) hours for 6 doses (Patient taking differently: Take 1,000 mg by mouth daily Every other day), Disp: 6 tablet, Rfl: 0     ALLERGIES:  No Known Allergies     ACTIVE PROBLEMS:  Patient Active Problem List   Diagnosis   • 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   • Hyperbilirubinemia   • Symptomatic anemia   • Pulmonary embolism with acute cor pulmonale (HCC)   • Portal vein thrombosis   • Elevated blood pressure reading without diagnosis of hypertension   • Shortness of breath   • Gastroesophageal reflux disease   • Autoimmune hemolytic anemia   • ARABELLA (acute kidney injury) ([facility] [address] )   • Splenomegaly   • Iron overload due to repeated red blood cell transfusions   • Posttraumatic stress disorder   • Essential hypertension   • Glucose intolerance (impaired glucose tolerance)   • Renal insufficiency   • Auto immune neutropenia (HCC)   • Primary osteoarthritis of left knee   • Pain in joint, lower leg   • Pain in left knee   • COVID-19   • Thrombocytosis   • Primary localized osteoarthrosis of the knee, right   • Hyperglycemia   • Chronic bilateral low back pain with bilateral sciatica   • Hypercholesterolemia   • Malignant melanoma of leg, right (HCC)   • Dyspnea   • Acute respiratory failure with hypoxia (HCC)   • Elevated serum creatinine   • Elevated bilirubin   • Leukocytosis   • Lymphocele after surgical procedure          PAST MEDICAL HISTORY:   Past Medical History:   Diagnosis Date   • Anemia 11/2/2016   • Anxiety    • Arthritis    • [facility]    • Claustrophobia    • COVID 12/2020   • DVT (deep venous thrombosis) (HCC)    • GERD (gastroesophageal reflux disease)    • Hearing loss, right    • Hemolytic anemia (HCC)    • History of transfusion     2018 - no adverse reaction   • Hypertension    • Malignant melanoma of leg, right ([facility] ) 7/1/2022   • Palpitation    • Portal vein thrombosis    • PTSD (post-traumatic stress disorder)    • Pulmonary emboli (HCC)    • Tobacco abuse         PAST SURGICAL HISTORY:  Past Surgical History:   Procedure Laterality Date   • CATARACT EXTRACTION Bilateral    • CHOLECYSTECTOMY  7/18/2017   • COLONOSCOPY • ELBOW ARTHROPLASTY Left     bursectomy   • IR DRAINAGE TUBE CHECK WITH SCLEROSIS  10/6/2022   • IR DRAINAGE TUBE CHECK WITH SCLEROSIS  10/10/2022   • IR DRAINAGE TUBE CHECK WITH SCLEROSIS  10/20/2022   • IR DRAINAGE TUBE PLACEMENT  9/19/2022   • JOINT REPLACEMENT Right 2/2/2021    knee   • KNEE SURGERY Right     meniscus tear   • LYMPH NODE BIOPSY Right 7/29/2022    Procedure: BIOPSY LYMPH NODE SENTINEL;  Surgeon: Salvador Hernandez MD;  [facility];  Service: Surgical Oncology   • MA LAP,CHOLECYSTECTOMY/GRAPH N/A 12/23/2017    Procedure: CHOLECYSTECTOMY LAPAROSCOPIC with cholangiogram;  Surgeon: Shelley Thompson MD;  [facility];  Service: General   • MA REMOVAL SPLEEN, TOTAL N/A 5/18/2017    Procedure: LAPAROSCOPIC HAND ASSIST SPLENECTOMY;  Surgeon: Lulú Hoang MD;  [facility];  Service: Surgical Oncology   • MA TOTAL KNEE ARTHROPLASTY Right 2/2/2021    Procedure: ARTHROPLASTY KNEE TOTAL;  Surgeon: Mabel Epps MD;  [facility];  Service: Orthopedics   • MA TOTAL KNEE ARTHROPLASTY Left 11/17/2021    Procedure: TOTAL KNEE REPLACEMENT;  Surgeon: Mabel Epps MD;  [facility];  Service: Orthopedics   • SHOULDER SURGERY Left     rotator cuff x4, reconstruction   • SKIN LESION EXCISION Right 7/29/2022    Procedure: WIDE EXCISION RIGHT MEDIAL THIGH;  Surgeon: Salvador Hernandez MD;  [facility];  Service: Surgical Oncology        SOCIAL HISTORY:  Social History     Socioeconomic History   • Marital status: /Civil Union     Spouse name: None   • Number of children: None   • Years of education: None   • Highest education level: None   Occupational History   • None   Tobacco Use   • Smoking status: Current Some Day Smoker     Packs/day: 0 00     Years: 5 00     Pack years: 0 00     Types: Cigars   • Smokeless tobacco: Current User     Types: Snuff   • Tobacco comment: 5  a week average   Vaping Use   • Vaping Use: Never used   Substance and Sexual Activity   • Alcohol use: Yes     Alcohol/week: 6 0 standard drinks     Types: 6 Cans of beer per week     Comment: socially   • Drug use: Yes     Frequency: 3 0 times per week     Types: Marijuana     Comment: medical marijuana   • Sexual activity: Yes     Partners: Female     Birth control/protection: None   Other Topics Concern   • None   Social History Narrative    Daily coffee consumption (2 cups/day)    reitred     Social Determinants of Health     Financial Resource Strain: Not on file   Food Insecurity: No Food Insecurity   • Worried About Running Out of Food in the Last Year: Never true   • Ran Out of Food in the Last Year: Never true   Transportation Needs: No Transportation Needs   • Lack of Transportation (Medical): No   • Lack of Transportation (Non-Medical): No   Physical Activity: Not on file   Stress: Not on file   Social Connections: Not on file   Intimate Partner Violence: Not on file   Housing Stability: Low Risk    • Unable to Pay for Housing in the Last Year: No   • Number of Places Lived in the Last Year: 1   • Unstable Housing in the Last Year: No        FAMILY HISTORY:  Family History   Problem Relation Age of Onset   • Cancer Mother    • Breast cancer Mother    • Other Father         CABG   • Heart attack Paternal Grandfather         acute MI           Objective    PHYSICAL EXAMINATION:   Blood pressure 130/70, pulse 86, temperature 97 6 °F (36 4 °C), temperature source Temporal, resp  rate 18, height 5' 7" (1 702 m), weight 90 kg (198 lb 8 oz), SpO2 97 %  Pain Score: 0-No pain      ECOG Performance Status    Flowsheet Row Most Recent Value   ECOG Performance Status 0 - Fully active, able to carry on all pre-disease performance without restriction            Physical Exam  Constitutional:       General: He is not in acute distress  Appearance: Normal appearance  He is not toxic-appearing     HENT:      Mouth/Throat:      Mouth: Mucous membranes are moist       Pharynx: Oropharynx is clear    Eyes:      General: No scleral icterus  Cardiovascular:      Rate and Rhythm: Normal rate and regular rhythm  Pulses: Normal pulses  Heart sounds: No murmur heard  No friction rub  No gallop  Pulmonary:      Effort: Pulmonary effort is normal  No respiratory distress  Breath sounds: Normal breath sounds  No wheezing or rales  Chest:   Breasts:      Right: No axillary adenopathy or supraclavicular adenopathy  Left: No axillary adenopathy or supraclavicular adenopathy  Abdominal:      General: There is no distension  Palpations: There is no mass  Tenderness: There is no abdominal tenderness  There is no rebound  Musculoskeletal:         General: No swelling or tenderness  Right lower leg: No edema  Left lower leg: No edema  Lymphadenopathy:      Head:      Right side of head: No submandibular, preauricular or posterior auricular adenopathy  Left side of head: No submandibular, preauricular or posterior auricular adenopathy  Cervical: No cervical adenopathy  Right cervical: No superficial or posterior cervical adenopathy  Left cervical: No superficial or posterior cervical adenopathy  Upper Body:      Right upper body: No supraclavicular or axillary adenopathy  Left upper body: No supraclavicular or axillary adenopathy  Lower Body: No right inguinal (drain in place) adenopathy  Skin:     Findings: No rash  Comments: Well healing surgical scar  No evidence of recurrence at primary site  Neurological:      General: No focal deficit present  Mental Status: He is alert and oriented to person, place, and time  Psychiatric:         Mood and Affect: Mood normal          Behavior: Behavior normal          Thought Content: Thought content normal          Judgment: Judgment normal          I reviewed lab data in the chart      WBC   Date Value Ref Range Status   10/21/2022 13 31 (H) 4 31 - 10 16 Thousand/uL Final 10/13/2022 14 58 (H) 4 31 - 10 16 Thousand/uL Final   10/05/2022 14 14 (H) 4 31 - 10 16 Thousand/uL Final     Hemoglobin   Date Value Ref Range Status   10/21/2022 14 5 12 0 - 17 0 g/dL Final   10/13/2022 14 1 12 0 - 17 0 g/dL Final   10/05/2022 14 7 12 0 - 17 0 g/dL Final     Platelets   Date Value Ref Range Status   10/21/2022 528 (H) 149 - 390 Thousands/uL Final   10/13/2022 420 (H) 149 - 390 Thousands/uL Final   10/05/2022 470 (H) 149 - 390 Thousands/uL Final     MCV   Date Value Ref Range Status   10/21/2022 113 (H) 82 - 98 fL Final   10/13/2022 114 (H) 82 - 98 fL Final   10/05/2022 114 (H) 82 - 98 fL Final      Potassium   Date Value Ref Range Status   10/21/2022 3 0 (L) 3 5 - 5 3 mmol/L Final   10/05/2022 4 1 3 5 - 5 3 mmol/L Final   09/07/2022 3 3 (L) 3 5 - 5 3 mmol/L Final     Chloride   Date Value Ref Range Status   10/21/2022 108 96 - 108 mmol/L Final   10/05/2022 105 96 - 108 mmol/L Final   09/07/2022 106 96 - 108 mmol/L Final     CO2   Date Value Ref Range Status   10/21/2022 25 21 - 32 mmol/L Final   10/05/2022 30 21 - 32 mmol/L Final   09/07/2022 29 21 - 32 mmol/L Final     CO2, i-STAT   Date Value Ref Range Status   05/18/2017 26 21 - 32 mmol/L Final     BUN   Date Value Ref Range Status   10/21/2022 23 5 - 25 mg/dL Final   10/05/2022 24 5 - 25 mg/dL Final   09/07/2022 14 5 - 25 mg/dL Final     Creatinine   Date Value Ref Range Status   10/21/2022 1 16 0 60 - 1 30 mg/dL Final     Comment:     Standardized to IDMS reference method   10/05/2022 1 20 0 60 - 1 30 mg/dL Final     Comment:     Standardized to IDMS reference method   09/07/2022 1 19 0 60 - 1 30 mg/dL Final     Comment:     Standardized to IDMS reference method     Glucose   Date Value Ref Range Status   10/05/2022 88 65 - 140 mg/dL Final     Comment:     If the patient is fasting, the ADA then defines impaired fasting glucose as > 100 mg/dL and diabetes as > or equal to 123 mg/dL    Specimen collection should occur prior to Sulfasalazine administration due to the potential for falsely depressed results  Specimen collection should occur prior to Sulfapyridine administration due to the potential for falsely elevated results  09/07/2022 134 65 - 140 mg/dL Final     Comment:     If the patient is fasting, the ADA then defines impaired fasting glucose as > 100 mg/dL and diabetes as > or equal to 123 mg/dL  Specimen collection should occur prior to Sulfasalazine administration due to the potential for falsely depressed results  Specimen collection should occur prior to Sulfapyridine administration due to the potential for falsely elevated results  08/30/2022 126 65 - 140 mg/dL Final     Comment:     If the patient is fasting, the ADA then defines impaired fasting glucose as > 100 mg/dL and diabetes as > or equal to 123 mg/dL  Specimen collection should occur prior to Sulfasalazine administration due to the potential for falsely depressed results  Specimen collection should occur prior to Sulfapyridine administration due to the potential for falsely elevated results  Calcium   Date Value Ref Range Status   10/21/2022 9 5 8 3 - 10 1 mg/dL Final   10/05/2022 9 5 8 3 - 10 1 mg/dL Final   09/07/2022 9 5 8 3 - 10 1 mg/dL Final     Albumin   Date Value Ref Range Status   10/21/2022 3 2 (L) 3 5 - 5 0 g/dL Final   10/05/2022 3 6 3 5 - 5 0 g/dL Final   09/07/2022 3 4 (L) 3 5 - 5 0 g/dL Final     Total Bilirubin   Date Value Ref Range Status   10/21/2022 0 47 0 20 - 1 00 mg/dL Final     Comment:     Use of this assay is not recommended for patients undergoing treatment with eltrombopag due to the potential for falsely elevated results  10/05/2022 0 70 0 20 - 1 00 mg/dL Final     Comment:     Use of this assay is not recommended for patients undergoing treatment with eltrombopag due to the potential for falsely elevated results     09/07/2022 1 36 (H) 0 20 - 1 00 mg/dL Final     Comment:     Use of this assay is not recommended for patients undergoing treatment with eltrombopag due to the potential for falsely elevated results  Alkaline Phosphatase   Date Value Ref Range Status   10/21/2022 54 46 - 116 U/L Final   10/05/2022 55 46 - 116 U/L Final   09/07/2022 62 46 - 116 U/L Final     AST   Date Value Ref Range Status   10/21/2022 20 5 - 45 U/L Final     Comment:     Specimen collection should occur prior to Sulfasalazine administration due to the potential for falsely depressed results  10/05/2022 19 5 - 45 U/L Final     Comment:     Specimen collection should occur prior to Sulfasalazine administration due to the potential for falsely depressed results  09/07/2022 20 5 - 45 U/L Final     Comment:     Specimen collection should occur prior to Sulfasalazine administration due to the potential for falsely depressed results  ALT   Date Value Ref Range Status   10/21/2022 34 12 - 78 U/L Final     Comment:     Specimen collection should occur prior to Sulfasalazine and/or Sulfapyridine administration due to the potential for falsely depressed results  10/05/2022 30 12 - 78 U/L Final     Comment:     Specimen collection should occur prior to Sulfasalazine and/or Sulfapyridine administration due to the potential for falsely depressed results  09/07/2022 29 12 - 78 U/L Final     Comment:     Specimen collection should occur prior to Sulfasalazine and/or Sulfapyridine administration due to the potential for falsely depressed results         LD   Date Value Ref Range Status   10/21/2022 236 (H) 81 - 234 U/L Final   07/30/2022 1,077 (H) 81 - 234 U/L Final   12/22/2020 754 (H) 81 - 234 U/L Final     LDH   Date Value Ref Range Status   03/11/2021 368 (H) 121 - 224 IU/L Final     No results found for: TSH  No results found for: [id number]   No results found for: FREET4      RECENT IMAGING:  Procedure: IR drainage tube check with sclerosis    Result Date: 10/13/2022  Narrative: IR drainage tube check with sclerosis Clinical History: Right groin lymphocele tube check and sclerosis  Contrast: 15cc Fluoro time: 0 7 MINUTES Radiation dose: 16 mGy Technique: The patient was brought to the interventional radiology suite and placed supine on the table  After a  view was obtained, contrast was injected into the lymphocele drainage catheter and images obtained  Findings: Small residual collection present  Contrast injected and aspirated easily  Intervention: 11cc 400mg of doxycycline mixed with 4cc 1% lidocaine  Catheter was capped  Patient to connect to bulb drainage after 2 hours  Patient to follow up with IR in 1 week  Impression: Impression: Small residual lymphocele  Sclerosis performed  Patient to follow up next week  Procedure performed, dictated, and signed by Miki Valles PA-C under supervision of Dr Therese Patel  Workstation performed: [id number]     Procedure: IR drainage tube check with sclerosis    Result Date: 10/6/2022  Narrative: Lymphocele tube check and sclerosis Clinical History: Follow-up of right groin lymphocele drain Contrast: 8 mL Ultravist 300 Fluoro time: 0 6 MINUTES Number of Images: Multiple Radiation dose: 1 mGy Technique: The patient was brought to the interventional radiology suite and placed supine on the table  After a  view was obtained, contrast was injected into the lymphocele drainage catheter and multiple images were obtained  Findings: A small residual collection is present  The injected contrast is easily aspirated  Intervention: 10  mg of doxycycline mixed with 5 cc 1% lidocaine was injected, and the catheter was capped  The catheter will be opened to bulb drainage after 2 hours  The patient will follow-up next week  Impression: Impression: Small residual lymphocele  Sclerosis performed  Patient will follow-up next week  Workstation performed: [id number]             Assessment/Plan  Mr Serge Calhoun is a 60 yr male with stage IIIC melanoma here for follow-up and discussion of adjuvant treatment        1  Malignant melanoma of leg, right ([facility] )  2  Malignant melanoma of right lower extremity including hip ([facility] )  He is doing well and the primary site on his right leg/medial knee has healed nicely  One area with healing scabbing  Continues with drain in the right groin with sclerosing agents with each visit  Continue follow-up with IR as planned  He has decided to start adjuvant treatment with encorafenib and binimetinib  We discussed the use of BRAF/MEK inhibitors in the adjuvant setting and the overall reduced RFS and improved PFS in Stage III melanoma treated with dabrafenib and trametinib  We discussed that encorafenib and binimetinib behave similarly in mechanism of action as dabrafenib and trametinib, with these ear administration and adherence to treatment  We will obtain insurance approval for encorafenib and binimetinib in the adjuvant setting  We did discuss the concern for development of treatment resistance  We discussed the side effects of the combination of the inhibitors which includes but is not limited to rash, migratory arthralgias and myalgias, GI distress, LFT abnormalities, potential for development of SCC, photosensitivity, pyrexia/fevers, and ocular toxicity, and cardiac toxicity  Labs reviewed and okay to start treatment  He does have potassium of 3  He currently takes potassium 1 tablet daily, it sounds like 20 mEq tablets  I told him to take 2 additional tablets once he gets home today  We will submit for insurance approval for treatment with encorafenib and binimetinib  We discussed with the year with treatment  We will obtain baseline echo in EKG now, as well as at follow-up time points to be determined once he starts the medication  We will see him back in the office 2 weeks after he begins treatment with encorafenib and binimetinib      In the meantime, he is wife know to call with any issues or concerns prior to his next visit    - Echo complete w/ contrast if indicated; Future  - ECG 12 lead; Future  - CBC and differential; Future  - Comprehensive metabolic panel; Future  - LD,Blood; Future  - T3, free; Future  - T4, free; Future  - TSH, 3rd generation; Future  - ECG 12 lead; Future      3  High risk medication use  He will start on treatment targeted therapy and we will monitor for side effects and lab abnormalities  We will monitor labs monthly to ensure safety of continuing on treatment  He knows to watch for signs and symptoms for side effects  We will continue to watch for cardiac toxicity as well,  which is associated with targeted therapy  He will get baseline echo in EKG  We will do serial EKGs while starting treatment  We will then continue to monitor her EKGs in echos every 3 months  - Echo complete w/ contrast if indicated; Future  - ECG 12 lead; Future  - CBC and differential; Future  - Comprehensive metabolic panel; Future  - LD,Blood; Future  - T3, free; Future  - T4, free; Future  - TSH, 3rd generation; Future  - ECG 12 lead; Future        Return once receives mediations - follow up two weeks after starting, for Office Visit, labs, scans       Amna Cantor MD, PhD

## 2022-10-24 NOTE — PROGRESS NOTES
[facility]ATOLOGY [facility] [place]  [address] [place] [id number] [phone]     Date of Visit: 10/24/2022  Name: Pooja Batista   YOB: 1955        Subjective    VISIT DIAGNOSIS:  Diagnoses and all orders for this visit:    Malignant melanoma of leg, right ([address] )  -     Echo complete w/ contrast if indicated; Future  -     ECG 12 lead; Future  -     CBC and differential; Future  -     Comprehensive metabolic panel; Future  -     LD,Blood; Future  -     T3, free; Future  -     T4, free; Future  -     TSH, 3rd generation; Future  -     ECG 12 lead; Future    Malignant melanoma of right lower extremity including hip ([facility] [address] )  -     Echo complete w/ contrast if indicated; Future  -     ECG 12 lead; Future  -     CBC and differential; Future  -     Comprehensive metabolic panel; Future  -     LD,Blood; Future  -     T3, free; Future  -     T4, free; Future  -     TSH, 3rd generation; Future  -     ECG 12 lead; Future    High risk medication use  -     Echo complete w/ contrast if indicated; Future  -     ECG 12 lead; Future  -     CBC and differential; Future  -     Comprehensive metabolic panel; Future  -     LD,Blood; Future  -     T3, free; Future  -     T4, free; Future  -     TSH, 3rd generation; Future  -     ECG 12 lead; Future        Oncology History   Malignant melanoma of leg, right ([facility] [address] )   5/31/2022 Biopsy    Right Leg, Shave Biopsy   Melanoma extending to the deep specimen margin  Thickness: 7 0mm  Ulceration: not seen   Mitoses: 3      5/31/2022 -  Cancer Staged    Staging form: Melanoma of the Skin, AJCC 8th Edition  - Clinical stage from 5/31/2022: Stage IIB (cT4a, cN0, cM0) - Signed by Jennyfer Barr MD on 8/25/2022 7/1/2022 Initial Diagnosis    Malignant melanoma of leg, right ([facility] [address] )     7/29/2022 Surgery    A  Lymph node, sentinel, #1:  One lymph node with rare metastatic melanoma cells (1/1), subcapsular location     Immunohistochemical study for MART-1, HMB45 and S100 supports the findings  B  Leg, right, knee, wide excision:  Residual melanoma, nodular type, and scar; margins free  See synoptic report  7/29/2022 -  Cancer Staged    Staging form: Melanoma of the Skin, AJCC 8th Edition  - Pathologic stage from 7/29/2022: Stage IIIC (pT4a, pN1a, cM0) - Signed by Brandy Trejo MD on 8/25/2022          Cancer Staging  Malignant melanoma of leg, right [facility])  Staging form: Melanoma of the Skin, AJCC 8th Edition  - Clinical stage from 5/31/2022: Stage IIB (cT4a, cN0, cM0) - Signed by Brandy Trejo MD on 8/25/2022  - Pathologic stage from 7/29/2022: Stage IIIC (pT4a, pN1a, cM0) - Signed by Brandy Trejo MD on 8/25/2022     Treatment Details   Treatment goal [No plan goal]   Plan Name ONE-TIME BLOOD PRODUCT TRANSFUSION PLAN   Status Active   Start Date 7/15/2022   End Date Until discontinued   Provider VISHNU Santiago   Chemotherapy [No matching medication found in this treatment plan]     Treatment Details   Treatment goal Palliative   Plan Name OP RiTUXimab weekly x 4, every 6 months   Status Active   Start Date 8/1/2022   End Date 8/23/2022   Provider Roberto Patel DO   Chemotherapy riTUXimab (RITUXAN) subsequent titrated chemo infusion, 375 mg/m2 = 746 2 mg, Intravenous, Once, 1 of 1 cycle  Administration: 746 2 mg (8/9/2022), 746 2 mg (8/16/2022), 746 2 mg (8/23/2022)    riTUXimab (RITUXAN) first titrated chemo infusion, 375 mg/m2 = 746 2 mg, Intravenous, Once, 1 of 1 cycle  Administration: 746 2 mg (8/1/2022)          HISTORY OF PRESENT ILLNESS: Shawnee Meza is a 75 y o  male  who has stage IIIC melanoma here for follow-up  He is doing okay continues to have the drain in his right groin  They continue with episodes of sclerosing  Otherwise is the primary site on his right leg has healed nicely  He denies any new, changing, concerning skin lesions  He denies any new lymphadenopathy      He has decided to proceed with treatment with adjuvant therapy with encorafenib and binimetinib  REVIEW OF SYSTEMS:  Review of Systems   Constitutional: Negative for appetite change, fatigue, fever and unexpected weight change  HENT:   Negative for lump/mass  Eyes: Negative for icterus  Respiratory: Negative for cough, shortness of breath and wheezing  Cardiovascular: Negative for leg swelling (imrpoved RLE edema)  Gastrointestinal: Negative for abdominal pain, constipation, diarrhea, nausea and vomiting  Genitourinary: Negative for difficulty urinating and hematuria  Musculoskeletal: Negative for arthralgias, gait problem and myalgias  Skin: Negative for itching and rash  No new, changing, or concerning lesions  Neurological: Negative for extremity weakness, gait problem, headaches, light-headedness and numbness  Hematological: Negative for adenopathy          MEDICATIONS:    Current Outpatient Medications:   •  acetaminophen (TYLENOL) 325 mg tablet, Take 2 tablets (650 mg total) by mouth every 6 (six) hours as needed for mild pain, Disp:  , Rfl: 0  •  ALPRAZolam (XANAX) 0 5 mg tablet, Take 1 tablet (0 5 mg total) by mouth 2 (two) times a day Take one two hours before scan and second one 30 min before, Disp: 2 tablet, Rfl: 0  •  benzonatate (TESSALON PERLES) 100 mg capsule, Take 1 capsule (100 mg total) by mouth 3 (three) times a day, Disp: 20 capsule, Rfl: 0  •  dabigatran etexilate (PRADAXA) 150 mg capsu, Take 1 capsule (150 mg total) by mouth every 12 (twelve) hours, Disp: 60 capsule, Rfl: 0  •  furosemide (LASIX) 20 mg tablet, Take 1 tablet (20 mg total) by mouth daily, Disp: 5 tablet, Rfl: 0  •  hydrochlorothiazide (HYDRODIURIL) 25 mg tablet, Take 1 tablet (25 mg total) by mouth daily (Patient taking differently: Take 25 mg by mouth every morning), Disp: 30 tablet, Rfl: 5  •  metoprolol succinate (TOPROL-XL) 25 mg 24 hr tablet, Take 0 5 tablets (12 5 mg total) by mouth daily (Patient taking differently: Take 12 5 mg by mouth every morning), Disp: 30 tablet, Rfl: 5  •  pantoprazole (PROTONIX) 40 mg tablet, Take 1 tablet (40 mg total) by mouth daily (Patient taking differently: Take 40 mg by mouth every morning), Disp: 90 tablet, Rfl: 3  •  potassium chloride (K-DUR,KLOR-CON) 20 mEq tablet, Take 1 tablet (20 mEq total) by mouth daily (Patient taking differently: Take 20 mEq by mouth every morning), Disp: 30 tablet, Rfl: 3  •  prazosin (MINIPRESS) 1 mg capsule, Take 1 mg by mouth daily at bedtime , Disp: , Rfl:   •  predniSONE 20 mg tablet, Take 2 tablets (40 mg total) by mouth daily, Disp: 60 tablet, Rfl: 0  •  sodium chloride, PF, 0 9 %, 10 mL by Intracatheter route daily Intracatheter flushing daily   May substitute prefilled syringe with normal saline 10 mL vials, 10 mL syringes, and 18 g blunt needles, Disp: 300 mL, Rfl: 0  •  sulfamethoxazole-trimethoprim (BACTRIM DS) 800-160 mg per tablet, Take 1 tablet by mouth 3 (three) times a week Monday, Wednesday and Friday, Disp: 12 tablet, Rfl: 0  •  VITAMIN D PO, Take 1,000 Units by mouth daily , Disp: , Rfl:   •  ascorbic acid (VITAMIN C) 1000 MG tablet, Take 1 tablet (1,000 mg total) by mouth every 12 (twelve) hours for 6 doses (Patient taking differently: Take 1,000 mg by mouth daily Every other day), Disp: 6 tablet, Rfl: 0     ALLERGIES:  No Known Allergies     ACTIVE PROBLEMS:  Patient Active Problem List   Diagnosis   • 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   • Hyperbilirubinemia   • Symptomatic anemia   • Pulmonary embolism with acute cor pulmonale (HCC)   • Portal vein thrombosis   • Elevated blood pressure reading without diagnosis of hypertension   • Shortness of breath   • Gastroesophageal reflux disease   • Autoimmune hemolytic anemia   • ARABELLA (acute kidney injury) ([facility] [address] )   • Splenomegaly   • Iron overload due to repeated red blood cell transfusions   • Posttraumatic stress disorder   • Essential hypertension   • Glucose intolerance (impaired glucose tolerance)   • Renal insufficiency   • Auto immune neutropenia (HCC)   • Primary osteoarthritis of left knee   • Pain in joint, lower leg   • Pain in left knee   • COVID-19   • Thrombocytosis   • Primary localized osteoarthrosis of the knee, right   • Hyperglycemia   • Chronic bilateral low back pain with bilateral sciatica   • Hypercholesterolemia   • Malignant melanoma of leg, right (HCC)   • Dyspnea   • Acute respiratory failure with hypoxia (HCC)   • Elevated serum creatinine   • Elevated bilirubin   • Leukocytosis   • Lymphocele after surgical procedure          PAST MEDICAL HISTORY:   Past Medical History:   Diagnosis Date   • Anemia 11/2/2016   • Anxiety    • Arthritis    • Autoimmune hemolytic anemia ([facility] [address] )    • Claustrophobia    • COVID 12/2020   • DVT (deep venous thrombosis) (HCC)    • GERD (gastroesophageal reflux disease)    • Hearing loss, right    • Hemolytic anemia (HCC)    • History of transfusion     2018 - no adverse reaction   • Hypertension    • Malignant melanoma of leg, right ([facility] [address] ) 7/1/2022   • Palpitation    • Portal vein thrombosis    • PTSD (post-traumatic stress disorder)    • Pulmonary emboli (HCC)    • Tobacco abuse         PAST SURGICAL HISTORY:  Past Surgical History:   Procedure Laterality Date   • CATARACT EXTRACTION Bilateral    • CHOLECYSTECTOMY  7/18/2017   • COLONOSCOPY     • ELBOW ARTHROPLASTY Left     bursectomy   • IR DRAINAGE TUBE CHECK WITH SCLEROSIS  10/6/2022   • IR DRAINAGE TUBE CHECK WITH SCLEROSIS  10/10/2022   • IR DRAINAGE TUBE CHECK WITH SCLEROSIS  10/20/2022   • IR DRAINAGE TUBE PLACEMENT  9/19/2022   • JOINT REPLACEMENT Right 2/2/2021    knee   • KNEE SURGERY Right     meniscus tear   • LYMPH NODE BIOPSY Right 7/29/2022    Procedure: BIOPSY LYMPH NODE SENTINEL;  Surgeon: Adams Mancera MD;  [facility];  Service: Surgical Oncology   • WY LAP,CHOLECYSTECTOMY/GRAPH N/A 12/23/2017    Procedure: CHOLECYSTECTOMY LAPAROSCOPIC with cholangiogram; Surgeon: Sabas Villalpando MD;  Location: [facility];  Service: General   • NJ REMOVAL SPLEEN, TOTAL N/A 5/18/2017    Procedure: LAPAROSCOPIC HAND ASSIST SPLENECTOMY;  Surgeon: Beto Joel MD;  [facility]: [facility];  Service: Surgical Oncology   • NJ TOTAL KNEE ARTHROPLASTY Right 2/2/2021    Procedure: ARTHROPLASTY KNEE TOTAL;  Surgeon: Tyra Estrella MD;  [facility];  Service: Orthopedics   • NJ TOTAL KNEE ARTHROPLASTY Left 11/17/2021    Procedure: TOTAL KNEE REPLACEMENT;  Surgeon: Tyra Estrella MD;  [facility];  Service: Orthopedics   • SHOULDER SURGERY Left     rotator cuff x4, reconstruction   • SKIN LESION EXCISION Right 7/29/2022    Procedure: WIDE EXCISION RIGHT MEDIAL THIGH;  Surgeon: Daniel Maritns MD;  [facility]: [facility];  Service: Surgical Oncology        SOCIAL HISTORY:  Social History     Socioeconomic History   • Marital status: /Civil Union     Spouse name: None   • Number of children: None   • Years of education: None   • Highest education level: None   Occupational History   • None   Tobacco Use   • Smoking status: Current Some Day Smoker     Packs/day: 0 00     Years: 5 00     Pack years: 0 00     Types: Cigars   • Smokeless tobacco: Current User     Types: Snuff   • Tobacco comment: 5  a week average   Vaping Use   • Vaping Use: Never used   Substance and Sexual Activity   • Alcohol use:  Yes     Alcohol/week: 6 0 standard drinks     Types: 6 Cans of beer per week     Comment: socially   • Drug use: Yes     Frequency: 3 0 times per week     Types: Marijuana     Comment: medical marijuana   • Sexual activity: Yes     Partners: Female     Birth control/protection: None   Other Topics Concern   • None   Social History Narrative    Daily coffee consumption (2 cups/day)    reitred     Social Determinants of Health     Financial Resource Strain: Not on file   Food Insecurity: No Food Insecurity   • Worried About Running Out of Food in the Last Year: Never true   • Ran Out of Food in the Last Year: Never true   Transportation Needs: No Transportation Needs   • Lack of Transportation (Medical): No   • Lack of Transportation (Non-Medical): No   Physical Activity: Not on file   Stress: Not on file   Social Connections: Not on file   Intimate Partner Violence: Not on file   Housing Stability: Low Risk    • Unable to Pay for Housing in the Last Year: No   • Number of Places Lived in the Last Year: 1   • Unstable Housing in the Last Year: No        FAMILY HISTORY:  Family History   Problem Relation Age of Onset   • Cancer Mother    • Breast cancer Mother    • Other Father         CABG   • Heart attack Paternal Grandfather         acute MI           Objective    PHYSICAL EXAMINATION:   Blood pressure 130/70, pulse 86, temperature 97 6 °F (36 4 °C), temperature source Temporal, resp  rate 18, height 5' 7" (1 702 m), weight 90 kg (198 lb 8 oz), SpO2 97 %  Pain Score: 0-No pain      ECOG Performance Status    Flowsheet Row Most Recent Value   ECOG Performance Status 0 - Fully active, able to carry on all pre-disease performance without restriction            Physical Exam  Constitutional:       General: He is not in acute distress  Appearance: Normal appearance  He is not toxic-appearing  HENT:      Mouth/Throat:      Mouth: Mucous membranes are moist       Pharynx: Oropharynx is clear  Eyes:      General: No scleral icterus  Cardiovascular:      Rate and Rhythm: Normal rate and regular rhythm  Pulses: Normal pulses  Heart sounds: No murmur heard  No friction rub  No gallop  Pulmonary:      Effort: Pulmonary effort is normal  No respiratory distress  Breath sounds: Normal breath sounds  No wheezing or rales  Chest:   Breasts:      Right: No axillary adenopathy or supraclavicular adenopathy  Left: No axillary adenopathy or supraclavicular adenopathy  Abdominal:      General: There is no distension  Palpations: There is no mass  Tenderness: There is no abdominal tenderness  There is no rebound  Musculoskeletal:         General: No swelling or tenderness  Right lower leg: No edema  Left lower leg: No edema  Lymphadenopathy:      Head:      Right side of head: No submandibular, preauricular or posterior auricular adenopathy  Left side of head: No submandibular, preauricular or posterior auricular adenopathy  Cervical: No cervical adenopathy  Right cervical: No superficial or posterior cervical adenopathy  Left cervical: No superficial or posterior cervical adenopathy  Upper Body:      Right upper body: No supraclavicular or axillary adenopathy  Left upper body: No supraclavicular or axillary adenopathy  Lower Body: No right inguinal (drain in place) adenopathy  Skin:     Findings: No rash  Comments: Well healing surgical scar  No evidence of recurrence at primary site  Neurological:      General: No focal deficit present  Mental Status: He is alert and oriented to person, place, and time  Psychiatric:         Mood and Affect: Mood normal          Behavior: Behavior normal          Thought Content: Thought content normal          Judgment: Judgment normal          I reviewed lab data in the chart      WBC   Date Value Ref Range Status   10/21/2022 13 31 (H) 4 31 - 10 16 Thousand/uL Final   10/13/2022 14 58 (H) 4 31 - 10 16 Thousand/uL Final   10/05/2022 14 14 (H) 4 31 - 10 16 Thousand/uL Final     Hemoglobin   Date Value Ref Range Status   10/21/2022 14 5 12 0 - 17 0 g/dL Final   10/13/2022 14 1 12 0 - 17 0 g/dL Final   10/05/2022 14 7 12 0 - 17 0 g/dL Final     Platelets   Date Value Ref Range Status   10/21/2022 528 (H) 149 - 390 Thousands/uL Final   10/13/2022 420 (H) 149 - 390 Thousands/uL Final   10/05/2022 470 (H) 149 - 390 Thousands/uL Final     MCV   Date Value Ref Range Status   10/21/2022 113 (H) 82 - 98 fL Final   10/13/2022 114 (H) 82 - 98 fL Final   10/05/2022 114 (H) 82 - 98 fL Final      Potassium   Date Value Ref Range Status   10/21/2022 3 0 (L) 3 5 - 5 3 mmol/L Final   10/05/2022 4 1 3 5 - 5 3 mmol/L Final   09/07/2022 3 3 (L) 3 5 - 5 3 mmol/L Final     Chloride   Date Value Ref Range Status   10/21/2022 108 96 - 108 mmol/L Final   10/05/2022 105 96 - 108 mmol/L Final   09/07/2022 106 96 - 108 mmol/L Final     CO2   Date Value Ref Range Status   10/21/2022 25 21 - 32 mmol/L Final   10/05/2022 30 21 - 32 mmol/L Final   09/07/2022 29 21 - 32 mmol/L Final     CO2, i-STAT   Date Value Ref Range Status   05/18/2017 26 21 - 32 mmol/L Final     BUN   Date Value Ref Range Status   10/21/2022 23 5 - 25 mg/dL Final   10/05/2022 24 5 - 25 mg/dL Final   09/07/2022 14 5 - 25 mg/dL Final     Creatinine   Date Value Ref Range Status   10/21/2022 1 16 0 60 - 1 30 mg/dL Final     Comment:     Standardized to IDMS reference method   10/05/2022 1 20 0 60 - 1 30 mg/dL Final     Comment:     Standardized to IDMS reference method   09/07/2022 1 19 0 60 - 1 30 mg/dL Final     Comment:     Standardized to IDMS reference method     Glucose   Date Value Ref Range Status   10/05/2022 88 65 - 140 mg/dL Final     Comment:     If the patient is fasting, the ADA then defines impaired fasting glucose as > 100 mg/dL and diabetes as > or equal to 123 mg/dL  Specimen collection should occur prior to Sulfasalazine administration due to the potential for falsely depressed results  Specimen collection should occur prior to Sulfapyridine administration due to the potential for falsely elevated results  09/07/2022 134 65 - 140 mg/dL Final     Comment:     If the patient is fasting, the ADA then defines impaired fasting glucose as > 100 mg/dL and diabetes as > or equal to 123 mg/dL  Specimen collection should occur prior to Sulfasalazine administration due to the potential for falsely depressed results   Specimen collection should occur prior to Sulfapyridine administration due to the potential for falsely elevated results  08/30/2022 126 65 - 140 mg/dL Final     Comment:     If the patient is fasting, the ADA then defines impaired fasting glucose as > 100 mg/dL and diabetes as > or equal to 123 mg/dL  Specimen collection should occur prior to Sulfasalazine administration due to the potential for falsely depressed results  Specimen collection should occur prior to Sulfapyridine administration due to the potential for falsely elevated results  Calcium   Date Value Ref Range Status   10/21/2022 9 5 8 3 - 10 1 mg/dL Final   10/05/2022 9 5 8 3 - 10 1 mg/dL Final   09/07/2022 9 5 8 3 - 10 1 mg/dL Final     Albumin   Date Value Ref Range Status   10/21/2022 3 2 (L) 3 5 - 5 0 g/dL Final   10/05/2022 3 6 3 5 - 5 0 g/dL Final   09/07/2022 3 4 (L) 3 5 - 5 0 g/dL Final     Total Bilirubin   Date Value Ref Range Status   10/21/2022 0 47 0 20 - 1 00 mg/dL Final     Comment:     Use of this assay is not recommended for patients undergoing treatment with eltrombopag due to the potential for falsely elevated results  10/05/2022 0 70 0 20 - 1 00 mg/dL Final     Comment:     Use of this assay is not recommended for patients undergoing treatment with eltrombopag due to the potential for falsely elevated results  09/07/2022 1 36 (H) 0 20 - 1 00 mg/dL Final     Comment:     Use of this assay is not recommended for patients undergoing treatment with eltrombopag due to the potential for falsely elevated results  Alkaline Phosphatase   Date Value Ref Range Status   10/21/2022 54 46 - 116 U/L Final   10/05/2022 55 46 - 116 U/L Final   09/07/2022 62 46 - 116 U/L Final     AST   Date Value Ref Range Status   10/21/2022 20 5 - 45 U/L Final     Comment:     Specimen collection should occur prior to Sulfasalazine administration due to the potential for falsely depressed results      10/05/2022 19 5 - 45 U/L Final     Comment:     Specimen collection should occur prior to Sulfasalazine administration due to the potential for falsely depressed results  09/07/2022 20 5 - 45 U/L Final     Comment:     Specimen collection should occur prior to Sulfasalazine administration due to the potential for falsely depressed results  ALT   Date Value Ref Range Status   10/21/2022 34 12 - 78 U/L Final     Comment:     Specimen collection should occur prior to Sulfasalazine and/or Sulfapyridine administration due to the potential for falsely depressed results  10/05/2022 30 12 - 78 U/L Final     Comment:     Specimen collection should occur prior to Sulfasalazine and/or Sulfapyridine administration due to the potential for falsely depressed results  09/07/2022 29 12 - 78 U/L Final     Comment:     Specimen collection should occur prior to Sulfasalazine and/or Sulfapyridine administration due to the potential for falsely depressed results  LD   Date Value Ref Range Status   10/21/2022 236 (H) 81 - 234 U/L Final   07/30/2022 1,077 (H) 81 - 234 U/L Final   12/22/2020 754 (H) 81 - 234 U/L Final     LDH   Date Value Ref Range Status   03/11/2021 368 (H) 121 - 224 IU/L Final     No results found for: TSH  No results found for: [id number]   No results found for: FREET4      RECENT IMAGING:  Procedure: IR drainage tube check with sclerosis    Result Date: 10/13/2022  Narrative: IR drainage tube check with sclerosis Clinical History: Right groin lymphocele tube check and sclerosis  Contrast: 15cc Fluoro time: 0 7 MINUTES Radiation dose: 16 mGy Technique: The patient was brought to the interventional radiology suite and placed supine on the table  After a  view was obtained, contrast was injected into the lymphocele drainage catheter and images obtained  Findings: Small residual collection present  Contrast injected and aspirated easily  Intervention: 11cc 400mg of doxycycline mixed with 4cc 1% lidocaine  Catheter was capped  Patient to connect to bulb drainage after 2 hours  Patient to follow up with IR in 1 week       Impression: Impression: Small residual lymphocele  Sclerosis performed  Patient to follow up next week  Procedure performed, dictated, and signed by Ole Glez PA-C under supervision of Dr Glenn Riddle  Workstation performed: [id number]     Procedure: IR drainage tube check with sclerosis    Result Date: 10/6/2022  Narrative: Lymphocele tube check and sclerosis Clinical History: Follow-up of right groin lymphocele drain Contrast: 8 mL Ultravist 300 Fluoro time: 0 6 MINUTES Number of Images: Multiple Radiation dose: 1 mGy Technique: The patient was brought to the interventional radiology suite and placed supine on the table  After a  view was obtained, contrast was injected into the lymphocele drainage catheter and multiple images were obtained  Findings: A small residual collection is present  The injected contrast is easily aspirated  Intervention: 10  mg of doxycycline mixed with 5 cc 1% lidocaine was injected, and the catheter was capped  The catheter will be opened to bulb drainage after 2 hours  The patient will follow-up next week  Impression: Impression: Small residual lymphocele  Sclerosis performed  Patient will follow-up next week  Workstation performed: [id number]             Assessment/Plan  Mr Nestor Montenegro is a 60 yr male with stage IIIC melanoma here for follow-up and discussion of adjuvant treatment  1  Malignant melanoma of leg, right ([address] )  2  Malignant melanoma of right lower extremity including hip ([address] )  He is doing well and the primary site on his right leg/medial knee has healed nicely  One area with healing scabbing  Continues with drain in the right groin with sclerosing agents with each visit  Continue follow-up with IR as planned  He has decided to start adjuvant treatment with encorafenib and binimetinib  We discussed the use of BRAF/MEK inhibitors in the adjuvant setting and the overall reduced RFS and improved PFS in Stage III melanoma treated with dabrafenib and trametinib    We discussed that encorafenib and binimetinib behave similarly in mechanism of action as dabrafenib and trametinib, with these ear administration and adherence to treatment  We will obtain insurance approval for encorafenib and binimetinib in the adjuvant setting  We did discuss the concern for development of treatment resistance  We discussed the side effects of the combination of the inhibitors which includes but is not limited to rash, migratory arthralgias and myalgias, GI distress, LFT abnormalities, potential for development of SCC, photosensitivity, pyrexia/fevers, and ocular toxicity, and cardiac toxicity  Labs reviewed and okay to start treatment  He does have potassium of 3  He currently takes potassium 1 tablet daily, it sounds like 20 mEq tablets  I told him to take 2 additional tablets once he gets home today  We will submit for insurance approval for treatment with encorafenib and binimetinib  We discussed with the year with treatment  We will obtain baseline echo in EKG now, as well as at follow-up time points to be determined once he starts the medication  We will see him back in the office 2 weeks after he begins treatment with encorafenib and binimetinib  In the meantime, he is wife know to call with any issues or concerns prior to his next visit    - Echo complete w/ contrast if indicated; Future  - ECG 12 lead; Future  - CBC and differential; Future  - Comprehensive metabolic panel; Future  - LD,Blood; Future  - T3, free; Future  - T4, free; Future  - TSH, 3rd generation; Future  - ECG 12 lead; Future      3  High risk medication use  He will start on treatment targeted therapy and we will monitor for side effects and lab abnormalities  We will monitor labs monthly to ensure safety of continuing on treatment  He knows to watch for signs and symptoms for side effects  We will continue to watch for cardiac toxicity as well,  which is associated with targeted therapy    He will get baseline echo in EKG  We will do serial EKGs while starting treatment  We will then continue to monitor her EKGs in echos every 3 months  - Echo complete w/ contrast if indicated; Future  - ECG 12 lead; Future  - CBC and differential; Future  - Comprehensive metabolic panel; Future  - LD,Blood; Future  - T3, free; Future  - T4, free; Future  - TSH, 3rd generation; Future  - ECG 12 lead; Future        Return once receives mediations - follow up two weeks after starting, for Office Visit, labs, scans       Vik Mauricio MD, PhD

## 2022-10-25 ENCOUNTER — HOSPITAL ENCOUNTER (OUTPATIENT)
Dept: NON INVASIVE DIAGNOSTICS | Facility: HOSPITAL | Age: 67
Discharge: HOME | End: 2022-10-25
Attending: INTERNAL MEDICINE
Payer: MEDICARE

## 2022-10-25 ENCOUNTER — APPOINTMENT (OUTPATIENT)
Dept: LAB | Facility: HOSPITAL | Age: 67
End: 2022-10-25
Payer: MEDICARE

## 2022-10-25 VITALS
HEART RATE: 73 BPM | DIASTOLIC BLOOD PRESSURE: 78 MMHG | SYSTOLIC BLOOD PRESSURE: 144 MMHG | HEIGHT: 67 IN | BODY MASS INDEX: 31.08 KG/M2 | WEIGHT: 198 LBS

## 2022-10-25 DIAGNOSIS — C43.71 MALIGNANT MELANOMA OF LEG, RIGHT (HCC): ICD-10-CM

## 2022-10-25 DIAGNOSIS — C43.71 MALIGNANT MELANOMA OF RIGHT LOWER EXTREMITY INCLUDING HIP (HCC): ICD-10-CM

## 2022-10-25 DIAGNOSIS — Z79.899 HIGH RISK MEDICATION USE: ICD-10-CM

## 2022-10-25 LAB
AORTIC VALVE MEAN VELOCITY: 10.5 M/S
APICAL FOUR CHAMBER EJECTION FRACTION: 71 %
ATRIAL RATE: 90 BPM
AV AREA BY CONTINUOUS VTI: 3 CM2
AV AREA PEAK VELOCITY: 2.5 CM2
AV LVOT MEAN GRADIENT: 2 MMHG
AV LVOT PEAK GRADIENT: 4 MMHG
AV MEAN GRADIENT: 5 MMHG
AV PEAK GRADIENT: 9 MMHG
AV VALVE AREA: 2.96 CM2
AV VELOCITY RATIO: 0.71
DOP CALC AO PEAK VEL: 1.47 M/S
DOP CALC AO VTI: 27.99 CM
DOP CALC LVOT AREA: 3.46 CM2
DOP CALC LVOT DIAMETER: 2.1 CM
DOP CALC LVOT PEAK VEL VTI: 23.97 CM
DOP CALC LVOT PEAK VEL: 1.04 M/S
DOP CALC LVOT STROKE INDEX: 40.8 ML/M2
DOP CALC LVOT STROKE VOLUME: 82.98
FRACTIONAL SHORTENING: 31 (ref 28–44)
INTERVENTRICULAR SEPTUM IN DIASTOLE (PARASTERNAL SHORT AXIS VIEW): 1.5 CM
INTERVENTRICULAR SEPTUM: 1.5 CM (ref 0.6–1.1)
LEFT INTERNAL DIMENSION IN SYSTOLE: 2.5 CM (ref 2.1–4)
LEFT VENTRICULAR INTERNAL DIMENSION IN DIASTOLE: 3.6 CM (ref 3.5–6)
LEFT VENTRICULAR POSTERIOR WALL IN END DIASTOLE: 1.3 CM
LEFT VENTRICULAR STROKE VOLUME: 33 ML
LVSV (TEICH): 33 ML
MV E'TISSUE VEL-LAT: 9 CM/S
MV E'TISSUE VEL-SEP: 11 CM/S
MV PEAK A VEL: 0.91 M/S
MV PEAK E VEL: 70 CM/S
P AXIS: 78 DEGREES
PR INTERVAL: 148 MS
QRS AXIS: 70 DEGREES
QRSD INTERVAL: 86 MS
QT INTERVAL: 360 MS
QTC INTERVAL: 440 MS
SL CV LV EF: 60
SL CV PED ECHO LEFT VENTRICLE DIASTOLIC VOLUME (MOD BIPLANE) 2D: 54 ML
SL CV PED ECHO LEFT VENTRICLE SYSTOLIC VOLUME (MOD BIPLANE) 2D: 21 ML
T WAVE AXIS: 49 DEGREES
VENTRICULAR RATE: 90 BPM

## 2022-10-25 PROCEDURE — 93321 DOPPLER ECHO F-UP/LMTD STD: CPT | Performed by: INTERNAL MEDICINE

## 2022-10-25 PROCEDURE — 93308 TTE F-UP OR LMTD: CPT | Performed by: INTERNAL MEDICINE

## 2022-10-25 PROCEDURE — 93356 MYOCRD STRAIN IMG SPCKL TRCK: CPT | Performed by: INTERNAL MEDICINE

## 2022-10-25 PROCEDURE — 93321 DOPPLER ECHO F-UP/LMTD STD: CPT

## 2022-10-25 PROCEDURE — 93005 ELECTROCARDIOGRAM TRACING: CPT

## 2022-10-25 PROCEDURE — 93325 DOPPLER ECHO COLOR FLOW MAPG: CPT

## 2022-10-25 PROCEDURE — 93010 ELECTROCARDIOGRAM REPORT: CPT

## 2022-10-25 PROCEDURE — 93308 TTE F-UP OR LMTD: CPT

## 2022-10-25 PROCEDURE — 93325 DOPPLER ECHO COLOR FLOW MAPG: CPT | Performed by: INTERNAL MEDICINE

## 2022-10-27 ENCOUNTER — HOSPITAL ENCOUNTER (OUTPATIENT)
Dept: RADIOLOGY | Facility: HOSPITAL | Age: 67
Discharge: HOME | End: 2022-10-27
Attending: INTERNAL MEDICINE
Payer: MEDICARE

## 2022-10-27 DIAGNOSIS — I89.8 LYMPHOCELE AFTER SURGICAL PROCEDURE: ICD-10-CM

## 2022-10-27 DIAGNOSIS — T81.89XA LYMPHOCELE AFTER SURGICAL PROCEDURE: ICD-10-CM

## 2022-10-27 PROCEDURE — 76080 X-RAY EXAM OF FISTULA: CPT

## 2022-10-27 PROCEDURE — 49185 SCLEROTX FLUID COLLECTION: CPT

## 2022-10-27 PROCEDURE — 49424 ASSESS CYST CONTRAST INJECT: CPT

## 2022-10-27 RX ORDER — ALCOHOL 1 ML/ML
INJECTION, SOLUTION PERCUTANEOUS CODE/TRAUMA/SEDATION MEDICATION
Status: COMPLETED | OUTPATIENT
Start: 2022-10-27 | End: 2022-10-27

## 2022-10-27 RX ADMIN — ALCOHOL 10 ML: 1 INJECTION, SOLUTION PERCUTANEOUS at 13:47

## 2022-10-27 RX ADMIN — IOHEXOL 10 ML: 350 INJECTION, SOLUTION INTRAVENOUS at 14:02

## 2022-10-27 NOTE — DISCHARGE INSTRUCTIONS
TUBE CARE INSTRUCTIONS    Care after your procedure:    Resume your normal diet  Small sips of flat soda will help with nausea  1  The properly functioning catheter should be forward flushed once (1x) daily with 10ml of normal saline using clean technique  You will be given a prescription for flushes  To flush the tube, clean both connections with alcohol swab  Twist off the drainage bag/ bulb  tubing and twist the saline syringe into the drainage tube and flush  Remove the syringe and twist the drainage bag / bulb tubing tubing back on     2  The drainage bag/bulb may be emptied as necessary  Keep a record of the amount of fluid you drain from your tube  This should be done with clean technique as well  3  A fresh dressing should be applied daily over the tube insertion site  4  As the tube is secured to the skin with only a suture,try not to pull on your tube  Tub baths are not permitted  Showers are permitted if the patient's skin entry site is prevented from getting wet  Similarly, washcloth "baths" are acceptable  Contact Interventional Radiology at [phone] Viktoriya PATIENTS: Contact Interventional Radiology at [phone]) Samuel Santiago PATIENTS: Contact Interventional Radiology at [phone]) if:    1  Leakage or large amounts of liquid around the catheter  2  Fever of 101 degrees lasting several hours without other obvious cause (such as sore throat, flu, etc)  3  Persistent nausea or vomiting  4  Diminished drainage, which may be associated with pressure or pain  Or when the     drainage from your tube is less than 10mls for 48 hours  5  Catheter pulled back or falls out  The following pharmacies carry the flush syringes         [facility]                     [facility]  [address]                         [address] [facility] [place]  Phone [phone]            Phone [phone]   [place] [address]                                [phone]  [address] [facility] Aniya THOMAS                      [address] [facility]  Phone [phone]            Phone [phone]                      [facility]                                                                                                          [phone]  [facility]  [facility] [address]    [address]  Phone [phone]

## 2022-10-27 NOTE — SEDATION DOCUMENTATION
IR tube check with sclerosis by Dr Reyes  10mL of alcohol instilled in drain and capped  Patient instructed to keep drain clamped for two hours until 1545 and then instructed to hook up drainage bag  AVS offered to patient and patient refused  Patient d/c to home

## 2022-10-28 ENCOUNTER — APPOINTMENT (OUTPATIENT)
Dept: LAB | Facility: MEDICAL CENTER | Age: 67
End: 2022-10-28
Payer: MEDICARE

## 2022-11-02 ENCOUNTER — TELEPHONE (OUTPATIENT)
Dept: HEMATOLOGY ONCOLOGY | Facility: CLINIC | Age: 67
End: 2022-11-02

## 2022-11-02 DIAGNOSIS — C43.71 MALIGNANT MELANOMA OF LEG, RIGHT (HCC): Primary | ICD-10-CM

## 2022-11-02 DIAGNOSIS — C43.71 MALIGNANT MELANOMA OF RIGHT LOWER EXTREMITY INCLUDING HIP (HCC): ICD-10-CM

## 2022-11-02 NOTE — TELEPHONE ENCOUNTER
Confirmed with oncology finance that Encorafenib and Binimetinib need to be filled through [address]  [address] [place] [address] at [phone] ext  [phone]  Spoke with pharmacy technician Tre Patel  She requested both prescriptions be faxed to their non-formulary group at [phone] along with last office note  She states they will let our office know if these medications cannot be filled through them  Called patient and made him aware to let us know if he receives any updates  He will call our office one he receives the medications in the mail

## 2022-11-03 ENCOUNTER — APPOINTMENT (OUTPATIENT)
Dept: LAB | Facility: MEDICAL CENTER | Age: 67
End: 2022-11-03

## 2022-11-03 DIAGNOSIS — D59.10 AUTOIMMUNE HEMOLYTIC ANEMIA (HCC): ICD-10-CM

## 2022-11-03 LAB
ERYTHROCYTE [DISTWIDTH] IN BLOOD BY AUTOMATED COUNT: 13.1 % (ref 11.6–15.1)
HCT VFR BLD AUTO: 43.9 % (ref 36.5–49.3)
HGB BLD-MCNC: 15 G/DL (ref 12–17)
MCH RBC QN AUTO: 37.1 PG (ref 26.8–34.3)
MCHC RBC AUTO-ENTMCNC: 34.2 G/DL (ref 31.4–37.4)
MCV RBC AUTO: 109 FL (ref 82–98)
PLATELET # BLD AUTO: 439 THOUSANDS/UL (ref 149–390)
PMV BLD AUTO: 10.2 FL (ref 8.9–12.7)
RBC # BLD AUTO: 4.04 MILLION/UL (ref 3.88–5.62)
WBC # BLD AUTO: 14.07 THOUSAND/UL (ref 4.31–10.16)

## 2022-11-04 ENCOUNTER — HOSPITAL ENCOUNTER (OUTPATIENT)
Dept: RADIOLOGY | Facility: HOSPITAL | Age: 67
Discharge: HOME | End: 2022-11-04
Attending: RADIOLOGY

## 2022-11-04 DIAGNOSIS — I89.8 LYMPHOCELE AFTER SURGICAL PROCEDURE: Primary | ICD-10-CM

## 2022-11-04 DIAGNOSIS — T81.89XA LYMPHOCELE AFTER SURGICAL PROCEDURE: Primary | ICD-10-CM

## 2022-11-04 RX ORDER — ALCOHOL 1 ML/ML
INJECTION, SOLUTION PERCUTANEOUS CODE/TRAUMA/SEDATION MEDICATION
Status: COMPLETED | OUTPATIENT
Start: 2022-11-04 | End: 2022-11-04

## 2022-11-04 RX ADMIN — ALCOHOL 10 ML: 1 INJECTION, SOLUTION PERCUTANEOUS at 11:41

## 2022-11-04 RX ADMIN — IOHEXOL 20 ML: 350 INJECTION, SOLUTION INTRAVENOUS at 11:45

## 2022-11-04 NOTE — BRIEF OP NOTE (RAD/CATH)
INTERVENTIONAL RADIOLOGY PROCEDURE NOTE    Date: 11/4/2022    Procedure: IR DRAINAGE TUBE CHECK WITH SCLEROSIS    Preoperative diagnosis:   1  Lymphocele after surgical procedure         Postoperative diagnosis: Same  Surgeon: Cassandra Garcia     Assistant: None  No qualified resident was available  Blood loss: none    Specimens: none     Findings: 25 ml residual seroma cavity treated with approximately 12 ml pure ethanol x 2 hours  Complications: None immediate      Anesthesia: none

## 2022-11-04 NOTE — SEDATION DOCUMENTATION
Pt with persistent cavity, dehydrated alcohol instilled into collection, pt hooked up to YELITZA drain per Dr Ugarte File order   Bag D/c'd- follow up in 1 week for repeat check/sclerosing

## 2022-11-08 ENCOUNTER — TELEPHONE (OUTPATIENT)
Dept: HEMATOLOGY ONCOLOGY | Facility: CLINIC | Age: 67
End: 2022-11-08

## 2022-11-08 NOTE — TELEPHONE ENCOUNTER
CALL RETURN FORM   Reason for patient call? Jefe Weinstein calling to have medications  encorafenib (BRAFTOVI) 75 MG capsule   binimetinib (MEKTOVI) 15 MG tablet     Resent with todays date  The prescriptions were not signed until yesterday and she can't review the 11/2/22 medication  Request  Please refax [phone]   Patient's primary oncologist? Dr Alanna Tena   Name of person the patient was calling for? Dr Alanna Tena   Any additional information to add, if applicable? [phone] ext [phone]    Informed patient that the message will be forwarded to the team and someone will get back to them as soon as possible    Did you relay this information to the patient?  yes

## 2022-11-10 ENCOUNTER — TELEPHONE (OUTPATIENT)
Dept: HEMATOLOGY ONCOLOGY | Facility: CLINIC | Age: 67
End: 2022-11-10

## 2022-11-10 NOTE — TELEPHONE ENCOUNTER
Patient called back  He states he just received the Braftovi in the mail today  He did not receive the Mektovi  Called and spoke with Rebecca Cantrell at the [employer]  He states both medications were shipped out on 11/8  Called patient again  He is aware to call once he gets the [facility] and we will schedule a follow up with Dr Aretha Hilario 2 weeks after he starts  He knows not to start the medications until he calls me

## 2022-11-10 NOTE — TELEPHONE ENCOUNTER
Called patient to see if he has heard from the [facility] about scheduling delivery for Braftovi and Mektovi  He states they called yesterday and needed something else from our office prior to scheduling delivery  Patient is unsure what they  Need but stated they will be reaching out  Attempted to call [facility] x2, phone continues to ring with no answer or option to leave voicemail  Will attempt to call again later

## 2022-11-11 ENCOUNTER — APPOINTMENT (OUTPATIENT)
Dept: LAB | Facility: MEDICAL CENTER | Age: 67
End: 2022-11-11

## 2022-11-14 ENCOUNTER — TELEPHONE (OUTPATIENT)
Dept: HEMATOLOGY ONCOLOGY | Facility: CLINIC | Age: 67
End: 2022-11-14

## 2022-11-14 NOTE — TELEPHONE ENCOUNTER
Called and spoke with patient  He has received both the encorafenib and binimetinib in the mail from the [facility]  He will start both medications tomorrow  We discussed administration and side effects  All questions answered and linonet verbalized understanding  He is aware to call with any new symptoms  Follow up scheduled with Dr Alli Schroeder on 11/30  He is aware he will need EKG and labs prior to appointment  He will complete this on Monday 11/28

## 2022-11-14 NOTE — TELEPHONE ENCOUNTER
CALL RETURN FORM   Reason for patient call? Patient calling in requesting to speak with Jaycee Hinton  Patient would like to advise jonnie that he received both of his medications and will begin taking them tomorrow 11/15/22  Patient's primary oncologist? Dr Sage Ernst    Name of person the patient was calling for? Jaycee Hinton    Any additional information to add, if applicable? Best call back number [phone]   Informed patient that the message will be forwarded to the team and someone will get back to them as soon as possible    Did you relay this information to the patient?  Yes

## 2022-11-15 ENCOUNTER — HOSPITAL ENCOUNTER (OUTPATIENT)
Dept: RADIOLOGY | Facility: HOSPITAL | Age: 67
Discharge: HOME | End: 2022-11-15
Attending: RADIOLOGY

## 2022-11-15 DIAGNOSIS — I89.8 LYMPHOCELE AFTER SURGICAL PROCEDURE: ICD-10-CM

## 2022-11-15 DIAGNOSIS — T81.89XA LYMPHOCELE AFTER SURGICAL PROCEDURE: ICD-10-CM

## 2022-11-15 NOTE — H&P
Interventional Radiology  History and Physical 11/15/2022     Temo Roach   1955   [id number]    Assessment/Plan:  79-year-old male with history of right knee melanoma status post wide local excision with sentinel right inguinal lymph node excisional biopsy, complicated by right inguinal lymphocele status post drainage catheter placement 2 months ago returns for repeat drain check  Problem List Items Addressed This Visit        Other    Lymphocele after surgical procedure    Relevant Orders    IR drainage tube check with sclerosis             Subjective:     Patient ID: Temo Roach is a 75 y o  male  History of Present Illness  Patient with history of right knee melanoma status post wide local excision with sentinel right inguinal lymph node excisional biopsy, complicated by right inguinal lymphocele status post drainage catheter placement 2 months ago returns for repeat drain check      Review of Systems      Past Medical History:   Diagnosis Date   • Anemia 11/2/2016   • Anxiety    • Arthritis    • Autoimmune hemolytic anemia ([address] )    • Claustrophobia    • COVID 12/2020   • DVT (deep venous thrombosis) (HCC)    • GERD (gastroesophageal reflux disease)    • Hearing loss, right    • Hemolytic anemia (HCC)    • History of transfusion     2018 - no adverse reaction   • Hypertension    • Malignant melanoma of leg, right ([address] ) 7/1/2022   • Palpitation    • Portal vein thrombosis    • PTSD (post-traumatic stress disorder)    • Pulmonary emboli (HCC)    • Tobacco abuse         Past Surgical History:   Procedure Laterality Date   • CATARACT EXTRACTION Bilateral    • CHOLECYSTECTOMY  7/18/2017   • COLONOSCOPY     • ELBOW ARTHROPLASTY Left     bursectomy   • IR DRAINAGE TUBE CHECK WITH SCLEROSIS  10/6/2022   • IR DRAINAGE TUBE CHECK WITH SCLEROSIS  10/10/2022   • IR DRAINAGE TUBE CHECK WITH SCLEROSIS  10/20/2022   • IR DRAINAGE TUBE CHECK WITH SCLEROSIS  10/27/2022   • IR DRAINAGE TUBE CHECK WITH SCLEROSIS 11/4/2022   • IR DRAINAGE TUBE PLACEMENT  9/19/2022   • JOINT REPLACEMENT Right 2/2/2021    knee   • KNEE SURGERY Right     meniscus tear   • LYMPH NODE BIOPSY Right 7/29/2022    Procedure: BIOPSY LYMPH NODE SENTINEL;  Surgeon: Mariaelena Gilbert MD;  [facility]: [facility];  Service: Surgical Oncology   • PA LAP,CHOLECYSTECTOMY/GRAPH N/A 12/23/2017    Procedure: CHOLECYSTECTOMY LAPAROSCOPIC with cholangiogram;  Surgeon: Larry Vasquez MD;  Location[facility];  Service: General   • PA REMOVAL SPLEEN, TOTAL N/A 5/18/2017    Procedure: LAPAROSCOPIC HAND ASSIST SPLENECTOMY;  Surgeon: Johnathan Silva MD;  [facility];  Service: Surgical Oncology   • PA TOTAL KNEE ARTHROPLASTY Right 2/2/2021    Procedure: ARTHROPLASTY KNEE TOTAL;  Surgeon: Payton Ordonez MD;  [facility];  Service: Orthopedics   • PA TOTAL KNEE ARTHROPLASTY Left 11/17/2021    Procedure: TOTAL KNEE REPLACEMENT;  Surgeon: Payton Ordonez MD;  [facility];  Service: Orthopedics   • SHOULDER SURGERY Left     rotator cuff x4, reconstruction   • SKIN LESION EXCISION Right 7/29/2022    Procedure: WIDE EXCISION RIGHT MEDIAL THIGH;  Surgeon: Mariaelena Gilbert MD;  [facility];  Service: Surgical Oncology        Social History     Tobacco Use   Smoking Status Current Some Day Smoker   • Packs/day: 0 00   • Years: 5 00   • Pack years: 0 00   • Types: Cigars   Smokeless Tobacco Current User   • Types: Snuff   Tobacco Comment    5  a week average        Social History     Substance and Sexual Activity   Alcohol Use Yes   • Alcohol/week: 6 0 standard drinks   • Types: 6 Cans of beer per week    Comment: socially        Social History     Substance and Sexual Activity   Drug Use Yes   • Frequency: 3 0 times per week   • Types: Marijuana    Comment: medical marijuana        No Known Allergies    Current Outpatient Medications   Medication Sig Dispense Refill   • acetaminophen (TYLENOL) 325 mg tablet Take 2 tablets (650 mg total) by mouth every 6 (six) hours as needed for mild pain  0   • ALPRAZolam (XANAX) 0 5 mg tablet Take 1 tablet (0 5 mg total) by mouth 2 (two) times a day Take one two hours before scan and second one 30 min before 2 tablet 0   • ascorbic acid (VITAMIN C) 1000 MG tablet Take 1 tablet (1,000 mg total) by mouth every 12 (twelve) hours for 6 doses (Patient taking differently: Take 1,000 mg by mouth daily Every other day) 6 tablet 0   • benzonatate (TESSALON PERLES) 100 mg capsule Take 1 capsule (100 mg total) by mouth 3 (three) times a day 20 capsule 0   • binimetinib (MEKTOVI) 15 MG tablet Take 3 tablets (45 mg total) by mouth every 12 (twelve) hours 180 tablet 5   • dabigatran etexilate (PRADAXA) 150 mg capsu Take 1 capsule (150 mg total) by mouth every 12 (twelve) hours 60 capsule 0   • encorafenib (BRAFTOVI) 75 MG capsule Take 6 capsules (450 mg total) by mouth daily 180 capsule 5   • furosemide (LASIX) 20 mg tablet Take 1 tablet (20 mg total) by mouth daily 5 tablet 0   • hydrochlorothiazide (HYDRODIURIL) 25 mg tablet Take 1 tablet (25 mg total) by mouth daily (Patient taking differently: Take 25 mg by mouth every morning) 30 tablet 5   • metoprolol succinate (TOPROL-XL) 25 mg 24 hr tablet Take 0 5 tablets (12 5 mg total) by mouth daily (Patient taking differently: Take 12 5 mg by mouth every morning) 30 tablet 5   • pantoprazole (PROTONIX) 40 mg tablet Take 1 tablet (40 mg total) by mouth daily (Patient taking differently: Take 40 mg by mouth every morning) 90 tablet 3   • potassium chloride (K-DUR,KLOR-CON) 20 mEq tablet Take 1 tablet (20 mEq total) by mouth daily (Patient taking differently: Take 20 mEq by mouth every morning) 30 tablet 3   • prazosin (MINIPRESS) 1 mg capsule Take 1 mg by mouth daily at bedtime      • predniSONE 20 mg tablet Take 2 tablets (40 mg total) by mouth daily 60 tablet 0   • sodium chloride, PF, 0 9 % 10 mL by Intracatheter route daily Intracatheter flushing daily   May substitute prefilled syringe with normal saline 10 mL vials, 10 mL syringes, and 18 g blunt needles 300 mL 0   • sulfamethoxazole-trimethoprim (BACTRIM DS) 800-160 mg per tablet Take 1 tablet by mouth 3 (three) times a week Monday, Wednesday and Friday 12 tablet 0   • VITAMIN D PO Take 1,000 Units by mouth daily        No current facility-administered medications for this encounter  Objective: There were no vitals filed for this visit  Physical Exam  Constitutional:       Appearance: Normal appearance  Pulmonary:      Effort: Pulmonary effort is normal    Skin:     Comments: R inguinal drain in place           No results found for: BNP   Lab Results   Component Value Date    WBC 12 33 (H) 11/11/2022    HGB 14 7 11/11/2022    HCT 42 8 11/11/2022     (H) 11/11/2022     (H) 11/11/2022     Lab Results   Component Value Date    INR 1 02 07/31/2022    INR 1 03 01/08/2021    INR 1 36 (H) 12/18/2020    PROTIME 13 6 07/31/2022    PROTIME 13 5 01/08/2021    PROTIME 16 5 (H) 12/18/2020     Lab Results   Component Value Date    PTT 20 (L) 08/05/2022         I have personally reviewed pertinent imaging and laboratory results  Code Status: Prior  Advance Directive and Living Will:      Power of :    POLST:      This text is generated with voice recognition software  There may be translation, syntax,  or grammatical errors  If you have any questions, please contact the dictating provider

## 2022-11-15 NOTE — SEDATION DOCUMENTATION
15mL of betadine injected into the cavity and capped  The patient instructed by Dr Yola Kellogg on when to drain it

## 2022-11-15 NOTE — BRIEF OP NOTE (RAD/CATH)
INTERVENTIONAL RADIOLOGY PROCEDURE NOTE    Date: 11/15/2022    Procedure:   Procedure Summary     Date: 11/15/22 Room / Location: [address] [place] [address] Interventional Radiology    Anesthesia Start:  Anesthesia Stop:     Procedure: IR DRAINAGE TUBE CHECK WITH SCLEROSIS Diagnosis:       Lymphocele after surgical procedure      (seroma sclerosis)    Scheduled Providers:  Responsible Provider:     Anesthesia Type: Not recorded ASA Status: Not recorded          Preoperative diagnosis:   1  Lymphocele after surgical procedure         Postoperative diagnosis: Same  Surgeon: Moody Dandy, MD     Assistant: None  No qualified resident was available  Blood loss: None    Specimens: None     Findings:   1  Persistent lymphocele cavity  2  Drain was retracted so it was exchanged for a new 10 Fr Maralyn Raring catheter  3  Sclerotherapy with 15 mL betadine performed  4  Return in 1 week for repeat drain check  Complications: None immediate      Anesthesia: local

## 2022-11-15 NOTE — DISCHARGE INSTRUCTIONS
TUBE CARE INSTRUCTIONS    Care after your procedure:    THE TUBE SHOULD REMAIN CAPPED FOR 2HRS AFTER YOUR PROCEDURE   YOU MAY THEN ATTACH THE SUCTION BULB  1  The properly functioning catheter should be forward flushed once (1x) daily with 10ml of normal saline using clean technique  To flush the tube, clean both connections with alcohol swab  Twist off the drainage bag/ bulb  tubing and twist the saline syringe into the drainage tube and flush  Remove the syringe and twist the drainage bag / bulb tubing tubing back on     2  The drainage bag/bulb may be emptied as necessary  Keep a record of the amount of fluid you drain from your tube  This should be done with clean technique as well  3  A fresh dressing should be applied daily over the tube insertion site  4  As the tube is secured to the skin with only a suture,try not to pull on your tube  Tub baths are not permitted  Showers are permitted if the patient's skin entry site is prevented from getting wet  Similarly, washcloth "baths" are acceptable  Contact Interventional Radiology at [phone] Viktoriya PATIENTS: Contact Interventional Radiology at [phone]) Orie Dancer PATIENTS: Contact Interventional Radiology at [phone]) if:    1  Leakage or large amounts of liquid around the catheter  2  Fever of 101 degrees lasting several hours without other obvious cause (such as sore throat, flu, etc)  3  Persistent nausea or vomiting  4  Diminished drainage, which may be associated with pressure or pain  Or when the     drainage from your tube is less than 10mls for 48 hours  5  Catheter pulled back or falls out  The following pharmacies carry the flush syringes         [facility]                     [facility]  [address] [facility]                         [address] [facility]  Phone [phone] Phone [address]                                [phone] [address] [facility] Aniya THOMAS                      [address] [facility]  Phone [phone]            Phone [phone]                      [facility]                                                                                                          [phone]  [facility]  [facility] [address]    [address]  Phone [phone]

## 2022-11-15 NOTE — SEDATION DOCUMENTATION
The patient reports no improvement in the amount of drainage on a daily basis  His YELITZA bulb has clear yellow fluid

## 2022-11-17 ENCOUNTER — TELEPHONE (OUTPATIENT)
Dept: HEMATOLOGY ONCOLOGY | Facility: CLINIC | Age: 67
End: 2022-11-17

## 2022-11-17 DIAGNOSIS — R11.0 NAUSEA: ICD-10-CM

## 2022-11-17 DIAGNOSIS — Z79.899 HIGH RISK MEDICATION USE: Primary | ICD-10-CM

## 2022-11-17 DIAGNOSIS — C43.71 MALIGNANT MELANOMA OF LEG, RIGHT (HCC): ICD-10-CM

## 2022-11-17 RX ORDER — ONDANSETRON 4 MG/1
4 TABLET, FILM COATED ORAL EVERY 8 HOURS PRN
Qty: 20 TABLET | Refills: 0 | Status: SHIPPED | OUTPATIENT
Start: 2022-11-17

## 2022-11-17 NOTE — TELEPHONE ENCOUNTER
Received voicemail from patient:    "Yeah  Marcio Phan, this is Jose Yi's date of birth, [birth year]  Yeah  Could you give me a call? I'm having some side effects from these drugs already and they're not good  If you could, [phone]  Thank you "    Called and spoke with patient  He states He started the taking Mektovi and Braftovi 2 days ago and developed nausea, dizziness and a headache  Patient states the dizziness was constant, making it hard for him to walk up or down the steps  Patient confirmed he took his medications with food at breakfast and dinner  He did not take the medications today and states he feels back to normal      Patient aware he should restart the medication and use tylenol for headaches  He is aware we will send nausea medication to his [employer]  He will call back if symptoms persist  Dr Danny Fajardo aware of plan

## 2022-11-22 ENCOUNTER — CLINICAL SUPPORT (OUTPATIENT)
Dept: FAMILY MEDICINE CLINIC | Facility: CLINIC | Age: 67
End: 2022-11-22

## 2022-11-22 DIAGNOSIS — Z23 FLU VACCINE NEED: Primary | ICD-10-CM

## 2022-11-25 ENCOUNTER — HOSPITAL ENCOUNTER (OUTPATIENT)
Dept: RADIOLOGY | Facility: HOSPITAL | Age: 67
Discharge: HOME | End: 2022-11-25
Attending: RADIOLOGY

## 2022-11-25 DIAGNOSIS — T81.89XA LYMPHOCELE AFTER SURGICAL PROCEDURE: ICD-10-CM

## 2022-11-25 DIAGNOSIS — I89.8 LYMPHOCELE AFTER SURGICAL PROCEDURE: ICD-10-CM

## 2022-11-25 RX ADMIN — IOHEXOL 1 ML: 350 INJECTION, SOLUTION INTRAVENOUS at 15:02

## 2022-11-25 NOTE — DISCHARGE INSTRUCTIONS
Drainage Tube Removal    Your drainage tube was removed today  What you need know at home:   Keep a clean dry dressing at the tube site until the small opening closes  It will take twenty four to forty eight hours  Keep the site dry until it heals  A small amount of drainage on your dressing is normal  Resume your normal diet  Small sips of flat soda will help with any nausea  Contact Interventional Radiology for any of the following: You have pain, fever greater than 101, shaking chills  If you have increased redness or swelling at the site  I the drainage from your site does not stop  If the site drains pus or has a bad odor       Contact Interventional Radiology at [phone]   Viktoriya PATIENTS: Contact Interventional Radiology at [phone]) Letha Rodarte PATIENTS: Contact Interventional Radiology at [phone]) if:

## 2022-11-28 ENCOUNTER — APPOINTMENT (OUTPATIENT)
Dept: URGENT CARE | Facility: MEDICAL CENTER | Age: 67
End: 2022-11-28

## 2022-11-28 ENCOUNTER — APPOINTMENT (OUTPATIENT)
Dept: LAB | Facility: MEDICAL CENTER | Age: 67
End: 2022-11-28

## 2022-11-28 DIAGNOSIS — Z79.899 HIGH RISK MEDICATION USE: ICD-10-CM

## 2022-11-28 DIAGNOSIS — C43.71 MALIGNANT MELANOMA OF LEG, RIGHT (HCC): ICD-10-CM

## 2022-11-28 DIAGNOSIS — C43.71 MALIGNANT MELANOMA OF RIGHT LOWER EXTREMITY INCLUDING HIP (HCC): ICD-10-CM

## 2022-11-28 LAB
ALBUMIN SERPL BCP-MCNC: 3.3 G/DL (ref 3.5–5)
ALP SERPL-CCNC: 62 U/L (ref 46–116)
ALT SERPL W P-5'-P-CCNC: 31 U/L (ref 12–78)
ANION GAP SERPL CALCULATED.3IONS-SCNC: 6 MMOL/L (ref 4–13)
AST SERPL W P-5'-P-CCNC: 22 U/L (ref 5–45)
ATRIAL RATE: 76 BPM
BASOPHILS # BLD AUTO: 0.13 THOUSANDS/ÂΜL (ref 0–0.1)
BASOPHILS NFR BLD AUTO: 1 % (ref 0–1)
BILIRUB SERPL-MCNC: 0.56 MG/DL (ref 0.2–1)
BUN SERPL-MCNC: 22 MG/DL (ref 5–25)
CALCIUM ALBUM COR SERPL-MCNC: 10.3 MG/DL (ref 8.3–10.1)
CALCIUM SERPL-MCNC: 9.7 MG/DL (ref 8.3–10.1)
CHLORIDE SERPL-SCNC: 106 MMOL/L (ref 96–108)
CO2 SERPL-SCNC: 28 MMOL/L (ref 21–32)
CREAT SERPL-MCNC: 1.44 MG/DL (ref 0.6–1.3)
EOSINOPHIL # BLD AUTO: 0.42 THOUSAND/ÂΜL (ref 0–0.61)
EOSINOPHIL NFR BLD AUTO: 3 % (ref 0–6)
ERYTHROCYTE [DISTWIDTH] IN BLOOD BY AUTOMATED COUNT: 13.1 % (ref 11.6–15.1)
GFR SERPL CREATININE-BSD FRML MDRD: 49 ML/MIN/1.73SQ M
GLUCOSE P FAST SERPL-MCNC: 186 MG/DL (ref 65–99)
HCT VFR BLD AUTO: 44.6 % (ref 36.5–49.3)
HGB BLD-MCNC: 14.8 G/DL (ref 12–17)
IMM GRANULOCYTES # BLD AUTO: 0.04 THOUSAND/UL (ref 0–0.2)
IMM GRANULOCYTES NFR BLD AUTO: 0 % (ref 0–2)
LDH SERPL-CCNC: 262 U/L (ref 81–234)
LYMPHOCYTES # BLD AUTO: 3.98 THOUSANDS/ÂΜL (ref 0.6–4.47)
LYMPHOCYTES NFR BLD AUTO: 32 % (ref 14–44)
MCH RBC QN AUTO: 35.7 PG (ref 26.8–34.3)
MCHC RBC AUTO-ENTMCNC: 33.2 G/DL (ref 31.4–37.4)
MCV RBC AUTO: 108 FL (ref 82–98)
MONOCYTES # BLD AUTO: 1 THOUSAND/ÂΜL (ref 0.17–1.22)
MONOCYTES NFR BLD AUTO: 8 % (ref 4–12)
NEUTROPHILS # BLD AUTO: 6.85 THOUSANDS/ÂΜL (ref 1.85–7.62)
NEUTS SEG NFR BLD AUTO: 56 % (ref 43–75)
NRBC BLD AUTO-RTO: 0 /100 WBCS
P AXIS: 35 DEGREES
PLATELET # BLD AUTO: 535 THOUSANDS/UL (ref 149–390)
PMV BLD AUTO: 10.7 FL (ref 8.9–12.7)
POTASSIUM SERPL-SCNC: 3.6 MMOL/L (ref 3.5–5.3)
PR INTERVAL: 164 MS
PROT SERPL-MCNC: 6.4 G/DL (ref 6.4–8.4)
QRS AXIS: 24 DEGREES
QRSD INTERVAL: 86 MS
QT INTERVAL: 386 MS
QTC INTERVAL: 434 MS
RBC # BLD AUTO: 4.15 MILLION/UL (ref 3.88–5.62)
SODIUM SERPL-SCNC: 140 MMOL/L (ref 135–147)
T WAVE AXIS: 30 DEGREES
T3FREE SERPL-MCNC: 2.69 PG/ML (ref 2.3–4.2)
T4 FREE SERPL-MCNC: 0.66 NG/DL (ref 0.76–1.46)
TSH SERPL DL<=0.05 MIU/L-ACNC: 1.93 UIU/ML (ref 0.45–4.5)
VENTRICULAR RATE: 76 BPM
WBC # BLD AUTO: 12.42 THOUSAND/UL (ref 4.31–10.16)

## 2022-11-29 NOTE — PROGRESS NOTES
Progress Note - Surgical Oncology  Deborah Joshi 75 y o  male MRN: [id number]  Unit/Bed#: [facility] 910-01 Encounter: [id number]      Objective: Patient doing well from surgical standpoint  No right thigh or groin discomfort  Does state firmness of the groin incision  Does also state posterior upper left calf throbbing and ache  On the heparin gtt for PE  On high flow oxygen  A Lower venous duplex is ordered and pending  Patient is tolerating a house diet without nausea of vomiting  Patient states he is ambulating well      4 UA's recorded    Blood pressure 128/73, pulse 100, temperature 98 8 °F (37 1 °C), resp  rate 18, height 5' 7" (1 702 m), weight 87 1 kg (192 lb), SpO2 99 %  ,Body mass index is 30 07 kg/m²  Intake/Output Summary (Last 24 hours) at 8/1/2022 7459  Last data filed at 7/31/2022 0801  Gross per 24 hour   Intake 76 5 ml   Output --   Net 76 5 ml       Invasive Devices  Report    Peripheral Intravenous Line  Duration           Peripheral IV 07/29/22 Left Arm 2 days    Peripheral IV 07/31/22 Left;Ventral (anterior) Forearm 1 day                Physical Exam:   Alert, orientated x 3, pleasant, On nasal high flow  Right groin incision clean and dry, small hematoma palpable under incision  Right mid thigh incision clean and dry, no lower extremity edema, no calf tenderness bilaterally, no pedal edema, both feet warm and dry    Lab, Imaging and other studies:  pending  VTE Pharmacologic Prophylaxis: Heparin gtt  VTE Mechanical Prophylaxis:     Assessment:  POD # 3 Wide excision right medial thigh melanoma, right groin sentinel node biopsy - Back    Plan:  As per primary team  May resume his Pradaxa from our standpoint Report to Otilia MCWILLIAMS, care endorsed

## 2022-11-30 ENCOUNTER — OFFICE VISIT (OUTPATIENT)
Dept: HEMATOLOGY ONCOLOGY | Facility: CLINIC | Age: 67
End: 2022-11-30

## 2022-11-30 VITALS
OXYGEN SATURATION: 98 % | SYSTOLIC BLOOD PRESSURE: 130 MMHG | BODY MASS INDEX: 30.45 KG/M2 | DIASTOLIC BLOOD PRESSURE: 70 MMHG | RESPIRATION RATE: 16 BRPM | HEIGHT: 67 IN | WEIGHT: 194 LBS | HEART RATE: 93 BPM | TEMPERATURE: 98 F

## 2022-11-30 DIAGNOSIS — C43.71 MALIGNANT MELANOMA OF LEG, RIGHT (HCC): Primary | ICD-10-CM

## 2022-11-30 DIAGNOSIS — Z79.899 HIGH RISK MEDICATION USE: ICD-10-CM

## 2022-12-06 ENCOUNTER — OFFICE VISIT (OUTPATIENT)
Dept: DERMATOLOGY | Facility: CLINIC | Age: 67
End: 2022-12-06

## 2022-12-06 VITALS — HEIGHT: 68 IN | BODY MASS INDEX: 30.16 KG/M2 | WEIGHT: 199 LBS

## 2022-12-06 DIAGNOSIS — C44.622 SQUAMOUS CELL CANCER OF SKIN OF RIGHT HAND: ICD-10-CM

## 2022-12-06 DIAGNOSIS — Z85.820 HISTORY OF MELANOMA: Primary | ICD-10-CM

## 2022-12-06 DIAGNOSIS — L57.0 ACTINIC KERATOSIS: ICD-10-CM

## 2022-12-06 DIAGNOSIS — D48.9 NEOPLASM OF UNCERTAIN BEHAVIOR, UNSPECIFIED: ICD-10-CM

## 2022-12-06 NOTE — PATIENT INSTRUCTIONS
HISTORY OF MELANOMA        Assessment and Plan:  Based on a thorough discussion of this condition and the management approach to it (including a comprehensive discussion of the known risks, side effects and potential benefits of treatment), the patient (family) agrees to implement the following specific plan: Will monitor for recurrence  Following up with Dr Rafaela Henderson  Recommend skin exam in 3 months    What happens at follow-up? The main purpose of follow-up is to detect recurrences early (metastatic melanoma), but it also offers an opportunity to diagnose a new primary melanoma at the first possible opportunity  A second invasive melanoma occurs in 5-10% of melanoma patients and a new melanoma in situ is diagnosed in more than 20% of melanoma patients  Our practice makes the following recommendations for follow-up for patients with invasive melanoma  At-least "monthly" self-skin examinations   Routine skin checks by a board certified dermatologist  Follow-up intervals are "every 3 months" within 2 years of a new melanoma diagnosis; "every 6 months" between 2-4 years of a new melanoma diagnosis; and "annually" after 4 years of a new melanoma diagnosis  Individual patient's needs should be considered before an appropriate follow-up is offered  Provide education and support to help the patient adjust to their illness    Follow-up appointments should include:  A check of the scar where the primary melanoma was removed  Checking the regional lymph nodes  A general skin examination  A full physical examination at least annually by your primary care physician    In those with more advanced primary disease, follow-up may include:  Blood tests  Imaging: ultrasound, X-ray, CT, MRI and PET scan  Most tests are not worthwhile for patients with stage 1 or 2 melanoma unless there are signs or symptoms of disease recurrence or metastasis   No tests are necessary for healthy patients who have remained well for five years or longer after removal of their melanoma  What is the outlook for patients with melanoma? Melanoma in situ is cured by excision because it has no potential to spread around the body  The risk of spread and ultimate death from invasive melanoma depends on several factors, but the main one is the Breslow thickness of the melanoma at the time it was surgically removed  Metastases are rare for melanomas < 0 75 mm and the risk for tumours 0 75-1 mm thick is about 5%  The risk steadily increases with thickness so that melanomas > 4 mm have a risk of metastasis of about 40%  Melanoma is a potentially serious type of skin cancer, in which there is uncontrolled growth of melanocytes (pigment cells)  Melanoma is sometimes called malignant melanoma  Normal melanocytes are found in the basal layer of the epidermis (the outer layer of skin)  Melanocytes produce a protein called melanin, which protects skin cells by absorbing ultraviolet (UV) radiation  Melanocytes are found in equal numbers in black and white skin, but melanocytes in black skin produce much more melanin  People with dark brown or black skin are very much less likely to be damaged by UV radiation than those with white skin  NEOPLASM OF UNCERTAIN BEHAVIOR OF SKIN      Assessment and Plan:  I have discussed with the patient that a sample of skin via a "skin biopsy” would be potentially helpful to further make a specific diagnosis under the microscope  Based on a thorough discussion of this condition and the management approach to it (including a comprehensive discussion of the known risks, side effects and potential benefits of treatment), the patient (family) agrees to implement the following specific plan:    Procedure:  Skin Biopsy    After a thorough discussion of treatment options and risk/benefits/alternatives (including but not limited to local pain, scarring, dyspigmentation, blistering, possible superinfection, and inability to confirm a diagnosis via histopathology), verbal and written consent were obtained and portion of the rash was biopsied for tissue sample  See below for consent that was obtained from patient and subsequent Procedure Note  PROCEDURE TANGENTIAL (SHAVE) BIOPSY NOTE:    Performing Physician: Leonarda Barraza    INFORMED CONSENT DISCUSSION AND POST-OPERATIVE INSTRUCTIONS FOR PATIENT    I   RATIONALE FOR PROCEDURE  I understand that a skin biopsy allows the Dermatologist to test a lesion or rash under the microscope to obtain a diagnosis  It usually involves numbing the area with numbing medication and removing a small piece of skin; sometimes the area will be closed with sutures  In this specific procedure, sutures are not usually needed  If any sutures are placed, then they are usually need to be removed in 2 weeks or less  I understand that my Dermatologist recommends that a skin "shave" biopsy be performed today  A local anesthetic, similar to the kind that a dentist uses when filling a cavity, will be injected with a very small needle into the skin area to be sampled  The injected skin and tissue underneath "will go to sleep” and become numb so no pain should be felt afterwards  An instrument shaped like a tiny "razor blade" (shave biopsy instrument) will be used to cut a small piece of tissue and skin from the area so that a sample of tissue can be taken and examined more closely under the microscope  A slight amount of bleeding will occur, but it will be stopped with direct pressure and a pressure bandage and any other appropriate methods  I understands that a scar will form where the wound was created  Surgical ointment will be applied to help protect the wound  Sutures are not usually needed      II   RISKS AND POTENTIAL COMPLICATIONS   I understand the risks and potential complications of a skin biopsy include but are not limited to the following:  Bleeding  Infection  Pain  Scar/keloid  Skin discoloration  Incomplete Removal  Recurrence  Nerve Damage/Numbness/Loss of Function  Allergic Reaction to Anesthesia  Biopsies are diagnostic procedures and based on findings additional treatment or evaluation may be required  Loss or destruction of specimen resulting in no additional findings    My Dermatologist has explained to me the nature of the condition, the nature of the procedure, and the benefits to be reasonably expected compared with alternative approaches  My Dermatologist has discussed the likelihood of major risks or complications of this procedure including the specific risks listed above, such as bleeding, infection, and scarring/keloid  I understand that a scar is expected after this procedure  I understand that my physician cannot predict if the scar will form a "keloid," which extends beyond the borders of the wound that is created  A keloid is a thick, painful, and bumpy scar  A keloid can be difficult to treat, as it does not always respond well to therapy, which includes injecting cortisone directly into the keloid every few weeks  While this usually reduces the pain and size of the scar, it does not eliminate it  I understand that photographs may be taken before and after the procedure  These will be maintained as part of the medical providers confidential records and may not be made available to me  I further authorize the medical provider to use the photographs for teaching purposes or to illustrate scientific papers, books, or lectures if in his/her judgment, medical research, education, or science may benefit from its use  I have had an opportunity to fully inquire about the risks and benefits of this procedure and its alternatives  I have been given ample time and opportunity to ask questions and to seek a second opinion if I wished to do so  I acknowledge that there have specifically been no guarantees as to the cosmetic results from the procedure    I am aware that with any procedure there is always the possibility of an unexpected complication  III  POST-PROCEDURAL CARE (WHAT YOU WILL NEED TO DO "AFTER THE BIOPSY" TO OPTIMIZE HEALING)    Keep the area clean and dry  Try NOT to remove the bandage or get it wet for the first 24 hours  Gently clean the area and apply surgical ointment (such as Vaseline petrolatum ointment, which is available "over the counter" and not a prescription) to the biopsy site for up to 2 weeks straight  This acts to protect the wound from the outside world  Sutures are not usually placed in this procedure  If any sutures were placed, return for suture removal as instructed (generally 1 week for the face, 2 weeks for the body)  Take Acetaminophen (Tylenol) for discomfort, if no contraindications  Ibuprofen or aspirin could make bleeding worse  Call our office immediately for signs of infection: fever, chills, increased redness, warmth, tenderness, discomfort/pain, or pus or foul smell coming from the wound  WHAT TO DO IF THERE IS ANY BLEEDING? If a small amount of bleeding is noticed, place a clean cloth over the area and apply firm pressure for ten minutes  Check the wound after 10 minutes of direct pressure  If bleeding persists, try one more time for an additional 10 minutes of direct pressure on the area  If the bleeding becomes heavier or does not stop after the second attempt, or if you have any other questions about this procedure, then please call your [facility] Dermatologist by calling [phone] (SKIN)  I hereby acknowledge that I have reviewed and verified the site with my Dermatologist and have requested and authorized my Dermatologist to proceed with the procedure      ACTINIC KERATOSIS        Assessment and Plan:  Based on a thorough discussion of this condition and the management approach to it (including a comprehensive discussion of the known risks, side effects and potential benefits of treatment), the patient (family) agrees to implement the following specific plan:    Treated with cryotherapy  Consent obtained    Actinic keratoses are very common on sites repeatedly exposed to the sun, especially the backs of the hands and the face, most often affecting the ears, nose, cheeks, upper lip, vermilion of the lower lip, temples, forehead and balding scalp  In severely chronically sun-damaged individuals, they may also be found on the upper trunk, upper and lower limbs, and dorsum of feet  We discussed the theoretical premalignant (“pre-cancerous”) nature and etiology of these growths  We discussed the prevailing notion that actinic keratoses are a reflection of abnormal skin cell development due to DNA damage by short wavelength UVB  They are more likely to appear if the immune function is poor, due to aging, recent sun exposure, predisposing disease or certain drugs  We discussed that the main concern is that actinic keratoses may predispose to squamous cell carcinoma  It is rare for a solitary actinic keratosis to evolve to squamous cell carcinoma (SCC), but the risk of SCC occurring at some stage in a patient with more than 10 actinic keratoses is thought to be about 10 to 15%  A tender, thickened, ulcerated or enlarging actinic keratosis is suspicious of SCC  Actinic keratoses may be prevented by strict sun protection  If already present, keratoses may improve with a very high sun protection factor (50+) broad-spectrum sunscreen applied at least daily to affected areas, year-round  We recommend that UPF-rated clothing and hats and sunglasses be worn whenever possible and that a sunscreen-moisturizer combination product such as Neutrogena Daily Defense be applied at least three times a day      We performed a thorough discussion of treatment options and specific risk/benefits/alternatives including but not limited to medical “field” treatment with medications such as the following:    Topical “field area” medications such as 5-fluorouracil or Aldara (specifically, the trouble with long-term compliance, blistering and local skin reaction versus the convenience of at-home therapy and that field therapy “gets what is not yet seen”)  Cryotherapy (specifically, local pain, scarring, dyspigmentation, blistering, possible superinfection, and treats “only what we see” versus directed treatment today)  Photodynamic therapy (specifically, local pain, scarring, dyspigmentation, blistering, possible superinfection, need to schedule for a later date, and time spent in the office versus field therapy that “gets what is not yet seen”)  PROCEDURE:  DESTRUCTION OF PRE-MALIGNANT LESIONS  After a thorough discussion of treatment options and risk/benefits/alternatives (including but not limited to local pain, scarring, dyspigmentation, blistering, and possible superinfection), verbal and written consent were obtained and the aforementioned lesions were treated on with cryotherapy using liquid nitrogen x 1 cycle for 5-10 seconds  The patient tolerated the procedure well, and after-care instructions were provided  MELANOCYTIC NEVI ("Moles")    Assessment and Plan:  Based on a thorough discussion of this condition and the management approach to it (including a comprehensive discussion of the known risks, side effects and potential benefits of treatment), the patient (family) agrees to implement the following specific plan:    The patient was encouraged to use an SPF30+ broad spectrum sunscreen daily and re-apply every 2-3 outdoors while outside  The importance of sun protection, self-skin exams, and sun avoidance was emphasized  An annual full body skin exam is recommended, and the patient was encouraged to return to the office sooner for any new or changing lesions of concerns    Benign, reassured  Annual skin check     Melanocytic Nevi  Melanocytic nevi ("moles") are tan or brown, raised or flat areas of the skin which have an increased number of melanocytes  Melanocytes are the cells in our body which make pigment and account for skin color  Some moles are present at birth (I e , "congenital nevi"), while others come up later in life (i e , "acquired nevi")  The sun can stimulate the body to make more moles  Sunburns are not the only thing that triggers more moles  Chronic sun exposure can do it too  Clinically distinguishing a healthy mole from melanoma may be difficult, even for experienced dermatologists  The "ABCDE's" of moles have been suggested as a means of helping to alert a person to a suspicious mole and the possible increased risk of melanoma  The suggestions for raising alert are as follows:    Asymmetry: Healthy moles tend to be symmetric, while melanomas are often asymmetric  Asymmetry means if you draw a line through the mole, the two halves do not match in color, size, shape, or surface texture  Asymmetry can be a result of rapid enlargement of a mole, the development of a raised area on a previously flat lesion, scaling, ulceration, bleeding or scabbing within the mole  Any mole that starts to demonstrate "asymmetry" should be examined promptly by a board certified dermatologist      Border: Healthy moles tend to have discrete, even borders  The border of a melanoma often blends into the normal skin and does not sharply delineate the mole from normal skin  Any mole that starts to demonstrate "uneven borders" should be examined promptly by a board certified dermatologist      Color: Healthy moles tend to be one color throughout  Melanomas tend to be made up of different colors ranging from dark black, blue, white, or red  Any mole that demonstrates a color change should be examined promptly by a board certified dermatologist      Diameter: Healthy moles tend to be smaller than 0 6 cm in size; an exception are "congenital nevi" that can be larger    Melanomas tend to grow and can often be greater than 0 6 cm (1/4 of an inch, or the size of a pencil eraser)  This is only a guideline, and many normal moles may be larger than 0 6 cm without being unhealthy  Any mole that starts to change in size (small to bigger or bigger to smaller) should be examined promptly by a board certified dermatologist      Evolving: Healthy moles tend to "stay the same "  Melanomas may often show signs of change or evolution such as a change in size, shape, color, or elevation  Any mole that starts to itch, bleed, crust, burn, hurt, or ulcerate or demonstrate a change or evolution should be examined promptly by a board certified dermatologist         Thaddeus Marie and Plan:  Based on a thorough discussion of this condition and the management approach to it (including a comprehensive discussion of the known risks, side effects and potential benefits of treatment), the patient (family) agrees to implement the following specific plan:    When outside we recommend using a wide brim hat, sunglasses, long sleeve and pants, sunscreen with SPF 78+ with reapplication every 2 hours, or SPF specific clothing       What is a lentigo? A lentigo is a pigmented flat or slightly raised lesion with a clearly defined edge  Unlike an ephelis (freckle), it does not fade in the winter months  There are several kinds of lentigo  The name lentigo originally referred to its appearance resembling a small lentil  The plural of lentigo is lentigines, although “lentigos” is also in common use  Who gets lentigines? Lentigines can affect males and females of all ages and races  Solar lentigines are especially prevalent in fair skinned adults  Lentigines associated with syndromes are present at birth or arise during childhood  What causes lentigines?   Common forms of lentigo are due to exposure to ultraviolet radiation:  Sun damage including sunburn   Indoor tanning   Phototherapy, especially photochemotherapy (PUVA)    Ionizing radiation, eg radiation therapy, can also cause lentigines  Several familial syndromes associated with widespread lentigines originate from mutations in Nathaniel-MAP kinase, mTOR signaling and PTEN pathways  What is the treatment for lentigines? Most lentigines are left alone  Attempts to lighten them may not be successful  The following approaches are used:  SPF 50+ broad-spectrum sunscreen   Hydroquinone bleaching cream   Alpha hydroxy acids   Vitamin C   Retinoids   Azelaic acid   Chemical peels  Individual lesions can be permanently removed using:  Cryotherapy   Intense pulsed light   Pigment lasers    How can lentigines be prevented? Lentigines associated with exposure ultraviolet radiation can be prevented by very careful sun protection  Clothing is more successful at preventing new lentigines than are sunscreens  What is the outlook for lentigines? Lentigines usually persist  They may increase in number with age and sun exposure  Some in sun-protected sites may fade and disappear  SIMS ANGIOMAS        Assessment and Plan:  Based on a thorough discussion of this condition and the management approach to it (including a comprehensive discussion of the known risks, side effects and potential benefits of treatment), the patient (family) agrees to implement the following specific plan:    Monitor for changes  Benign, reassured      Assessment and Plan:    Cherry angioma, also known as Harvey Fryer spots, are benign vascular skin lesions  A "cherry angioma" is a firm red, blue or purple papule, 0 1-1 cm in diameter  When thrombosed, they can appear black in colour until evaluated with a dermatoscope when the red or purple colour is more easily seen  Cherry angioma may develop on any part of the body but most often appear on the scalp, face, lips and trunk  An angioma is due to proliferating endothelial cells; these are the cells that line the inside of a blood vessel      Angiomas can arise in early life or later in life; the most common type of angioma is a cherry angioma  Cherry angiomas are very common in males and females of any age or race  They are more noticeable in white skin than in skin of colour  They markedly increase in number from about the age of 36  There may be a family history of similar lesions  Eruptive cherry angiomas have been rarely reported to be associated with internal malignancy  The cause of angiomas is unknown  Genetic analysis of cherry angiomas has shown that they frequently carry specific somatic missense mutations in the GNAQ and GNA11 (Q209H) genes, which are involved in other vascular and melanocytic proliferations  SEBORRHEIC KERATOSIS; NON-INFLAMED    Assessment and Plan:  Based on a thorough discussion of this condition and the management approach to it (including a comprehensive discussion of the known risks, side effects and potential benefits of treatment), the patient (family) agrees to implement the following specific plan:    Monitor for changes  Benign, reassured      Seborrheic Keratosis  A seborrheic keratosis is a harmless warty spot that appears during adult life as a common sign of skin aging  Seborrheic keratoses can arise on any area of skin, covered or uncovered, with the usual exception of the palms and soles  They do not arise from mucous membranes  Seborrheic keratoses can have highly variable appearance  Seborrheic keratoses are extremely common  It has been estimated that over 90% of adults over the age of 61 years have one or more of them  They occur in males and females of all races, typically beginning to erupt in the 35s or 45s  They are uncommon under the age of 21 years  The precise cause of seborrhoeic keratoses is not known  Seborrhoeic keratoses are considered degenerative in nature  As time goes by, seborrheic keratoses tend to become more numerous   Some people inherit a tendency to develop a very large number of them; some people may have hundreds of them  There is no easy way to remove multiple lesions on a single occasion  Unless a specific lesion is "inflamed" and is causing pain or stinging/burning or is bleeding, most insurance companies do not authorize treatment

## 2022-12-06 NOTE — PROGRESS NOTES
[facility] [address] [facility] Note     Patient Name: Charlsie Scheuermann  Encounter Date: 12/6/22     Have you been cared for by a [place] [address] Dermatologist in the last 3 years and, if so, which description applies to you? Yes  I have been here within the last 3 years, and my medical history has NOT changed since that time  I am MALE/not capable of bearing children  REVIEW OF SYSTEMS:  Have you recently had or currently have any of the following? · No changes in my recent health  PAST MEDICAL HISTORY:  Have you personally ever had or currently have any of the following? If "YES," then please provide more detail  · No changes in my medical history  FAMILY HISTORY:  Any "first degree relatives" (parent, brother, sister, or child) with the following? • No changes in my family's known health  PATIENT EXPERIENCE:    • Do you want the Dermatologist to perform a COMPLETE skin exam today including a clinical examination under the "bra and underwear" areas? Yes  • If necessary, do we have your permission to call and leave a detailed message on your Preferred Phone number that includes your specific medical information?   Yes      No Known Allergies   Current Outpatient Medications:   •  acetaminophen (TYLENOL) 325 mg tablet, Take 2 tablets (650 mg total) by mouth every 6 (six) hours as needed for mild pain, Disp:  , Rfl: 0  •  ALPRAZolam (XANAX) 0 5 mg tablet, Take 1 tablet (0 5 mg total) by mouth 2 (two) times a day Take one two hours before scan and second one 30 min before, Disp: 2 tablet, Rfl: 0  •  ascorbic acid (VITAMIN C) 1000 MG tablet, Take 1 tablet (1,000 mg total) by mouth every 12 (twelve) hours for 6 doses (Patient taking differently: Take 1,000 mg by mouth daily Every other day), Disp: 6 tablet, Rfl: 0  •  benzonatate (TESSALON PERLES) 100 mg capsule, Take 1 capsule (100 mg total) by mouth 3 (three) times a day, Disp: 20 capsule, Rfl: 0  •  binimetinib (MEKTOVI) 15 MG tablet, Take 3 tablets (45 mg total) by mouth every 12 (twelve) hours, Disp: 180 tablet, Rfl: 5  •  dabigatran etexilate (PRADAXA) 150 mg capsu, Take 1 capsule (150 mg total) by mouth every 12 (twelve) hours, Disp: 60 capsule, Rfl: 0  •  encorafenib (BRAFTOVI) 75 MG capsule, Take 6 capsules (450 mg total) by mouth daily, Disp: 180 capsule, Rfl: 5  •  furosemide (LASIX) 20 mg tablet, Take 1 tablet (20 mg total) by mouth daily, Disp: 5 tablet, Rfl: 0  •  hydrochlorothiazide (HYDRODIURIL) 25 mg tablet, Take 1 tablet (25 mg total) by mouth daily (Patient taking differently: Take 25 mg by mouth every morning), Disp: 30 tablet, Rfl: 5  •  metoprolol succinate (TOPROL-XL) 25 mg 24 hr tablet, Take 0 5 tablets (12 5 mg total) by mouth daily (Patient taking differently: Take 12 5 mg by mouth every morning), Disp: 30 tablet, Rfl: 5  •  ondansetron (ZOFRAN) 4 mg tablet, Take 1 tablet (4 mg total) by mouth every 8 (eight) hours as needed for nausea or vomiting, Disp: 20 tablet, Rfl: 0  •  pantoprazole (PROTONIX) 40 mg tablet, Take 1 tablet (40 mg total) by mouth daily (Patient taking differently: Take 40 mg by mouth every morning), Disp: 90 tablet, Rfl: 3  •  potassium chloride (K-DUR,KLOR-CON) 20 mEq tablet, Take 1 tablet (20 mEq total) by mouth daily (Patient taking differently: Take 20 mEq by mouth every morning), Disp: 30 tablet, Rfl: 3  •  prazosin (MINIPRESS) 1 mg capsule, Take 1 mg by mouth daily at bedtime , Disp: , Rfl:   •  predniSONE 20 mg tablet, Take 2 tablets (40 mg total) by mouth daily, Disp: 60 tablet, Rfl: 0  •  sodium chloride, PF, 0 9 %, 10 mL by Intracatheter route daily Intracatheter flushing daily   May substitute prefilled syringe with normal saline 10 mL vials, 10 mL syringes, and 18 g blunt needles, Disp: 300 mL, Rfl: 0  •  sulfamethoxazole-trimethoprim (BACTRIM DS) 800-160 mg per tablet, Take 1 tablet by mouth 3 (three) times a week Monday, Wednesday and Friday, Disp: 12 tablet, Rfl: 0  •  VITAMIN D PO, Take 1,000 Units by mouth daily , Disp: , Rfl:           • Whom besides the patient is providing clinical information about today's encounter?   o NO ADDITIONAL HISTORIAN (patient alone provided history)    Physical Exam and Assessment/Plan by Diagnosis:    HISTORY OF MELANOMA    Physical Exam:  • Anatomic Location Affected:  Right leg/knee  • Morphological Description of Scar:  Well healed scar  • Year Treated: 2022  • TNM Classification: [id number]  • Suspected Recurrence: no  • Regional adenopathy: no    Additional History of Present Condition:  74 yo male with history of invasive melanoma (7 0 mm on right leg, non-ulcerated with 3 mitotic figures/hpf and a positive sentinel node and negative margins on WLE) stage IIIC (pT4a, pN1a, cM0)  Patient being followed by Dr Suman Balbuena and was being treated with encorafenib and binimetinib  Patient notes he stopped them 2 days ago due to GI upset/cramping, dizziness, and lightheadedness  Patient UTD on dentist but has not seen eye doctor  He is otherwise feeling well  Assessment and Plan:  Based on a thorough discussion of this condition and the management approach to it (including a comprehensive discussion of the known risks, side effects and potential benefits of treatment), the patient (family) agrees to implement the following specific plan:    • Will monitor for recurrence; scar appears well healed with NER and no LAD on exam  • Following up with Dr Suman Balbuena on Wednesday next week  • Recommend skin exam in 3 months  • Advised dental and eye exam annually    What happens at follow-up? The main purpose of follow-up is to detect recurrences early (metastatic melanoma), but it also offers an opportunity to diagnose a new primary melanoma at the first possible opportunity  A second invasive melanoma occurs in 5-10% of melanoma patients and a new melanoma in situ is diagnosed in more than 20% of melanoma patients      Our practice makes the following recommendations for follow-up for patients with invasive melanoma  • At-least "monthly" self-skin examinations   • Routine skin checks by a board certified dermatologist  • Follow-up intervals are "every 3 months" within 2 years of a new melanoma diagnosis; "every 6 months" between 2-4 years of a new melanoma diagnosis; and "annually" after 4 years of a new melanoma diagnosis  • Individual patient's needs should be considered before an appropriate follow-up is offered  • Provide education and support to help the patient adjust to their illness    Follow-up appointments should include:  • A check of the scar where the primary melanoma was removed  • Checking the regional lymph nodes  • A general skin examination  • A full physical examination at least annually by your primary care physician    In those with more advanced primary disease, follow-up may include:  • Blood tests  • Imaging: ultrasound, X-ray, CT, MRI and PET scan  Most tests are not worthwhile for patients with stage 1 or 2 melanoma unless there are signs or symptoms of disease recurrence or metastasis  No tests are necessary for healthy patients who have remained well for five years or longer after removal of their melanoma  What is the outlook for patients with melanoma? • Melanoma in situ is cured by excision because it has no potential to spread around the body  • The risk of spread and ultimate death from invasive melanoma depends on several factors, but the main one is the Breslow thickness of the melanoma at the time it was surgically removed  • Metastases are rare for melanomas < 0 75 mm and the risk for tumours 0 75-1 mm thick is about 5%  The risk steadily increases with thickness so that melanomas > 4 mm have a risk of metastasis of about 40%  Melanoma is a potentially serious type of skin cancer, in which there is uncontrolled growth of melanocytes (pigment cells)  Melanoma is sometimes called malignant melanoma    Normal melanocytes are found in the basal layer of the epidermis (the outer layer of skin)  Melanocytes produce a protein called melanin, which protects skin cells by absorbing ultraviolet (UV) radiation  Melanocytes are found in equal numbers in black and white skin, but melanocytes in black skin produce much more melanin  People with dark brown or black skin are very much less likely to be damaged by UV radiation than those with white skin  NEOPLASM OF UNCERTAIN BEHAVIOR OF SKIN    Physical Exam:  • (Anatomic Location); (Size and Morphological Description); (Differential Diagnosis):    Specimen A Right dorsal hand; skin; shave biopsy; ddx squamous cell carcinoma vs other  o   • Pertinent Positives:  • Pertinent Negatives: Additional History of Present Condition:  Patient states Dr Mahendra Jarrett noticed and states needs a biopsy  Assessment and Plan:  • I have discussed with the patient that a sample of skin via a "skin biopsy” would be potentially helpful to further make a specific diagnosis under the microscope  • Based on a thorough discussion of this condition and the management approach to it (including a comprehensive discussion of the known risks, side effects and potential benefits of treatment), the patient (family) agrees to implement the following specific plan:    o Procedure:  Skin Biopsy  After a thorough discussion of treatment options and risk/benefits/alternatives (including but not limited to local pain, scarring, dyspigmentation, blistering, possible superinfection, and inability to confirm a diagnosis via histopathology), verbal and written consent were obtained and portion of the rash was biopsied for tissue sample  See below for consent that was obtained from patient and subsequent Procedure Note      PROCEDURE TANGENTIAL (SHAVE) BIOPSY NOTE:    • Performing Physician: Ismael Olszewski  • Anatomic Location; Clinical Description with size (cm); Pre-Op Diagnosis:      Specimen A Right dorsal hand; skin; shave biopsy; 4 mm scaly papule; ddx squamous cell carcinoma verses other  • Post-op diagnosis: Same     • Local anesthesia: 2% Lidocaine  HCL     • Topical anesthesia: None    • Hemostasis: Aluminum chloride       After obtaining informed consent  at which time there was a discussion about the purpose of biopsy  and low risks of infection and bleeding  The area was prepped and draped in the usual fashion  Anesthesia was obtained with 1% lidocaine with epinephrine  A shave biopsy to an appropriate sampling depth was obtained by Shave (Dermablade or 15 blade) The resulting wound was covered with surgical ointment and bandaged appropriately  The patient tolerated the procedure well without complications and was without signs of functional compromise  Specimen has been sent for review by Dermatopathology  Standard post-procedure care has been explained and has been included in written form within the patient's copy of Informed Consent  INFORMED CONSENT DISCUSSION AND POST-OPERATIVE INSTRUCTIONS FOR PATIENT    I   RATIONALE FOR PROCEDURE  I understand that a skin biopsy allows the Dermatologist to test a lesion or rash under the microscope to obtain a diagnosis  It usually involves numbing the area with numbing medication and removing a small piece of skin; sometimes the area will be closed with sutures  In this specific procedure, sutures are not usually needed  If any sutures are placed, then they are usually need to be removed in 2 weeks or less  I understand that my Dermatologist recommends that a skin "shave" biopsy be performed today  A local anesthetic, similar to the kind that a dentist uses when filling a cavity, will be injected with a very small needle into the skin area to be sampled  The injected skin and tissue underneath "will go to sleep” and become numb so no pain should be felt afterwards    An instrument shaped like a tiny "razor blade" (shave biopsy instrument) will be used to cut a small piece of tissue and skin from the area so that a sample of tissue can be taken and examined more closely under the microscope  A slight amount of bleeding will occur, but it will be stopped with direct pressure and a pressure bandage and any other appropriate methods  I understands that a scar will form where the wound was created  Surgical ointment will be applied to help protect the wound  Sutures are not usually needed  II   RISKS AND POTENTIAL COMPLICATIONS   I understand the risks and potential complications of a skin biopsy include but are not limited to the following:  • Bleeding  • Infection  • Pain  • Scar/keloid  • Skin discoloration  • Incomplete Removal  • Recurrence  • Nerve Damage/Numbness/Loss of Function  • Allergic Reaction to Anesthesia  • Biopsies are diagnostic procedures and based on findings additional treatment or evaluation may be required  • Loss or destruction of specimen resulting in no additional findings    My Dermatologist has explained to me the nature of the condition, the nature of the procedure, and the benefits to be reasonably expected compared with alternative approaches  My Dermatologist has discussed the likelihood of major risks or complications of this procedure including the specific risks listed above, such as bleeding, infection, and scarring/keloid  I understand that a scar is expected after this procedure  I understand that my physician cannot predict if the scar will form a "keloid," which extends beyond the borders of the wound that is created  A keloid is a thick, painful, and bumpy scar  A keloid can be difficult to treat, as it does not always respond well to therapy, which includes injecting cortisone directly into the keloid every few weeks  While this usually reduces the pain and size of the scar, it does not eliminate it  I understand that photographs may be taken before and after the procedure    These will be maintained as part of the medical providers confidential records and may not be made available to me  I further authorize the medical provider to use the photographs for teaching purposes or to illustrate scientific papers, books, or lectures if in his/her judgment, medical research, education, or science may benefit from its use  I have had an opportunity to fully inquire about the risks and benefits of this procedure and its alternatives  I have been given ample time and opportunity to ask questions and to seek a second opinion if I wished to do so  I acknowledge that there have specifically been no guarantees as to the cosmetic results from the procedure  I am aware that with any procedure there is always the possibility of an unexpected complication  III  POST-PROCEDURAL CARE (WHAT YOU WILL NEED TO DO "AFTER THE BIOPSY" TO OPTIMIZE HEALING)    • Keep the area clean and dry  Try NOT to remove the bandage or get it wet for the first 24 hours  • Gently clean the area and apply surgical ointment (such as Vaseline petrolatum ointment, which is available "over the counter" and not a prescription) to the biopsy site for up to 2 weeks straight  This acts to protect the wound from the outside world  • Sutures are not usually placed in this procedure  If any sutures were placed, return for suture removal as instructed (generally 1 week for the face, 2 weeks for the body)  • Take Acetaminophen (Tylenol) for discomfort, if no contraindications  Ibuprofen or aspirin could make bleeding worse  • Call our office immediately for signs of infection: fever, chills, increased redness, warmth, tenderness, discomfort/pain, or pus or foul smell coming from the wound  WHAT TO DO IF THERE IS ANY BLEEDING? If a small amount of bleeding is noticed, place a clean cloth over the area and apply firm pressure for ten minutes  Check the wound after 10 minutes of direct pressure    If bleeding persists, try one more time for an additional 10 minutes of direct pressure on the area   If the bleeding becomes heavier or does not stop after the second attempt, or if you have any other questions about this procedure, then please call your [facility] Dermatologist by calling [phone] (SKIN)  I hereby acknowledge that I have reviewed and verified the site with my Dermatologist and have requested and authorized my Dermatologist to proceed with the procedure  ACTINIC KERATOSIS    Physical Exam:  • Anatomic Location Affected:  Right hand and face  • Morphological Description:  Erythematous scaly macules without palpable dermal component    Additional History of Present Condition:  Discovered upon examination    Assessment and Plan:  Based on a thorough discussion of this condition and the management approach to it (including a comprehensive discussion of the known risks, side effects and potential benefits of treatment), the patient (family) agrees to implement the following specific plan:    • Treated with cryotherapy  • Precancerous nature reviewed  • RTC in 4 weeks if not fully resolved for possibly biopsy  • Consent obtained    Actinic keratoses are very common on sites repeatedly exposed to the sun, especially the backs of the hands and the face, most often affecting the ears, nose, cheeks, upper lip, vermilion of the lower lip, temples, forehead and balding scalp  In severely chronically sun-damaged individuals, they may also be found on the upper trunk, upper and lower limbs, and dorsum of feet  We discussed the theoretical premalignant (“pre-cancerous”) nature and etiology of these growths  We discussed the prevailing notion that actinic keratoses are a reflection of abnormal skin cell development due to DNA damage by short wavelength UVB  They are more likely to appear if the immune function is poor, due to aging, recent sun exposure, predisposing disease or certain drugs      We discussed that the main concern is that actinic keratoses may predispose to squamous cell carcinoma  It is rare for a solitary actinic keratosis to evolve to squamous cell carcinoma (SCC), but the risk of SCC occurring at some stage in a patient with more than 10 actinic keratoses is thought to be about 10 to 15%  A tender, thickened, ulcerated or enlarging actinic keratosis is suspicious of SCC  Actinic keratoses may be prevented by strict sun protection  If already present, keratoses may improve with a very high sun protection factor (50+) broad-spectrum sunscreen applied at least daily to affected areas, year-round  We recommend that UPF-rated clothing and hats and sunglasses be worn whenever possible and that a sunscreen-moisturizer combination product such as Neutrogena Daily Defense be applied at least three times a day  We performed a thorough discussion of treatment options and specific risk/benefits/alternatives including but not limited to medical “field” treatment with medications such as the following:    • Topical “field area” medications such as 5-fluorouracil or Aldara (specifically, the trouble with long-term compliance, blistering and local skin reaction versus the convenience of at-home therapy and that field therapy “gets what is not yet seen”)  • Cryotherapy (specifically, local pain, scarring, dyspigmentation, blistering, possible superinfection, and treats “only what we see” versus directed treatment today)  • Photodynamic therapy (specifically, local pain, scarring, dyspigmentation, blistering, possible superinfection, need to schedule for a later date, and time spent in the office versus field therapy that “gets what is not yet seen”)      PROCEDURE:  DESTRUCTION OF PRE-MALIGNANT LESIONS  After a thorough discussion of treatment options and risk/benefits/alternatives (including but not limited to local pain, scarring, dyspigmentation, blistering, and possible superinfection), verbal and written consent were obtained and the aforementioned lesions were treated on with cryotherapy using liquid nitrogen x 1 cycle for 5-10 seconds  • TOTAL NUMBER of 6 pre-malignant lesions were treated today on the ANATOMIC LOCATION: right hand x 2, left cheek, right temple x 3   The patient tolerated the procedure well, and after-care instructions were provided  MELANOCYTIC NEVI ("Moles")    Physical Exam:  • Anatomic Location Affected:   Mostly on sun-exposed areas of the trunk and extremities  • Morphological Description:  Scattered, 1-4mm round to ovoid, symmetric and evenly bordered, regularly pigmented macules/papules without outliers other than if noted elsewhere in today's note  • Pertinent Positives:  • Pertinent Negatives: Additional History of Present Condition:      Assessment and Plan:  Based on a thorough discussion of this condition and the management approach to it (including a comprehensive discussion of the known risks, side effects and potential benefits of treatment), the patient (family) agrees to implement the following specific plan:    • The patient was encouraged to use an SPF30+ broad spectrum sunscreen daily and re-apply every 2-3 outdoors while outside  The importance of sun protection, self-skin exams, and sun avoidance was emphasized  An annual full body skin exam is recommended, and the patient was encouraged to return to the office sooner for any new or changing lesions of concerns  • Benign, reassured  • Annual skin check     Melanocytic Nevi  Melanocytic nevi ("moles") are tan or brown, raised or flat areas of the skin which have an increased number of melanocytes  Melanocytes are the cells in our body which make pigment and account for skin color  Some moles are present at birth (I e , "congenital nevi"), while others come up later in life (i e , "acquired nevi")  The sun can stimulate the body to make more moles  Sunburns are not the only thing that triggers more moles  Chronic sun exposure can do it too       Clinically distinguishing a healthy mole from melanoma may be difficult, even for experienced dermatologists  The "ABCDE's" of moles have been suggested as a means of helping to alert a person to a suspicious mole and the possible increased risk of melanoma  The suggestions for raising alert are as follows:    Asymmetry: Healthy moles tend to be symmetric, while melanomas are often asymmetric  Asymmetry means if you draw a line through the mole, the two halves do not match in color, size, shape, or surface texture  Asymmetry can be a result of rapid enlargement of a mole, the development of a raised area on a previously flat lesion, scaling, ulceration, bleeding or scabbing within the mole  Any mole that starts to demonstrate "asymmetry" should be examined promptly by a board certified dermatologist      Border: Healthy moles tend to have discrete, even borders  The border of a melanoma often blends into the normal skin and does not sharply delineate the mole from normal skin  Any mole that starts to demonstrate "uneven borders" should be examined promptly by a board certified dermatologist      Color: Healthy moles tend to be one color throughout  Melanomas tend to be made up of different colors ranging from dark black, blue, white, or red  Any mole that demonstrates a color change should be examined promptly by a board certified dermatologist      Diameter: Healthy moles tend to be smaller than 0 6 cm in size; an exception are "congenital nevi" that can be larger  Melanomas tend to grow and can often be greater than 0 6 cm (1/4 of an inch, or the size of a pencil eraser)  This is only a guideline, and many normal moles may be larger than 0 6 cm without being unhealthy    Any mole that starts to change in size (small to bigger or bigger to smaller) should be examined promptly by a board certified dermatologist      Evolving: Healthy moles tend to "stay the same "  Melanomas may often show signs of change or evolution such as a change in size, shape, color, or elevation  Any mole that starts to itch, bleed, crust, burn, hurt, or ulcerate or demonstrate a change or evolution should be examined promptly by a board certified dermatologist         LENTIGO    Physical Exam:  • Anatomic Location Affected: Face and upper back  • Morphological Description:  Light brown well demarcated macules on sun exposed skin with reassuring dermoscopy  • Pertinent Positives:  • Pertinent Negatives: Additional History of Present Condition:      Assessment and Plan:  Based on a thorough discussion of this condition and the management approach to it (including a comprehensive discussion of the known risks, side effects and potential benefits of treatment), the patient (family) agrees to implement the following specific plan:    • When outside we recommend using a wide brim hat, sunglasses, long sleeve and pants, sunscreen with SPF 88+ with reapplication every 2 hours, or SPF specific clothing       What is a lentigo? A lentigo is a pigmented flat or slightly raised lesion with a clearly defined edge  Unlike an ephelis (freckle), it does not fade in the winter months  There are several kinds of lentigo  The name lentigo originally referred to its appearance resembling a small lentil  The plural of lentigo is lentigines, although “lentigos” is also in common use  Who gets lentigines? Lentigines can affect males and females of all ages and races  Solar lentigines are especially prevalent in fair skinned adults  Lentigines associated with syndromes are present at birth or arise during childhood  What causes lentigines? Common forms of lentigo are due to exposure to ultraviolet radiation:  • Sun damage including sunburn   • Indoor tanning   • Phototherapy, especially photochemotherapy (PUVA)    Ionizing radiation, eg radiation therapy, can also cause lentigines    Several familial syndromes associated with widespread lentigines originate from mutations in Nathaniel-MAP kinase, mTOR signaling and PTEN pathways  What is the treatment for lentigines? Most lentigines are left alone  Attempts to lighten them may not be successful  The following approaches are used:  • SPF 50+ broad-spectrum sunscreen   • Hydroquinone bleaching cream   • Alpha hydroxy acids   • Vitamin C   • Retinoids   • Azelaic acid   • Chemical peels  Individual lesions can be permanently removed using:  • Cryotherapy   • Intense pulsed light   • Pigment lasers    How can lentigines be prevented? Lentigines associated with exposure ultraviolet radiation can be prevented by very careful sun protection  Clothing is more successful at preventing new lentigines than are sunscreens  What is the outlook for lentigines? Lentigines usually persist  They may increase in number with age and sun exposure  Some in sun-protected sites may fade and disappear  SIMS ANGIOMAS    Physical Exam:  • Anatomic Location Affected:  trunk  • Morphological Description:  Scattered cherry red, variably sized papules  • Pertinent Positives:  • Pertinent Negatives: Additional History of Present Condition:      Assessment and Plan:  Based on a thorough discussion of this condition and the management approach to it (including a comprehensive discussion of the known risks, side effects and potential benefits of treatment), the patient (family) agrees to implement the following specific plan:    • Monitor for changes  • Benign, reassured      Assessment and Plan:    Cherry angioma, also known as Sage Carl spots, are benign vascular skin lesions  A "cherry angioma" is a firm red, blue or purple papule, 0 1-1 cm in diameter  When thrombosed, they can appear black in colour until evaluated with a dermatoscope when the red or purple colour is more easily seen  Cherry angioma may develop on any part of the body but most often appear on the scalp, face, lips and trunk    An angioma is due to proliferating endothelial cells; these are the cells that line the inside of a blood vessel  Angiomas can arise in early life or later in life; the most common type of angioma is a cherry angioma  Cherry angiomas are very common in males and females of any age or race  They are more noticeable in white skin than in skin of colour  They markedly increase in number from about the age of 36  There may be a family history of similar lesions  Eruptive cherry angiomas have been rarely reported to be associated with internal malignancy  The cause of angiomas is unknown  Genetic analysis of cherry angiomas has shown that they frequently carry specific somatic missense mutations in the GNAQ and GNA11 (Q209H) genes, which are involved in other vascular and melanocytic proliferations  SEBORRHEIC KERATOSIS; NON-INFLAMED    Physical Exam:  • Anatomic Location Affected:  trunk  • Morphological Description:  Brown waxy variably sized "stuck-on" appearing papules with reassuring dermoscopy  • Pertinent Positives:  • Pertinent Negatives: Additional History of Present Condition:      Assessment and Plan:  Based on a thorough discussion of this condition and the management approach to it (including a comprehensive discussion of the known risks, side effects and potential benefits of treatment), the patient (family) agrees to implement the following specific plan:    • Monitor for changes  • Benign, reassured      Seborrheic Keratosis  A seborrheic keratosis is a harmless warty spot that appears during adult life as a common sign of skin aging  Seborrheic keratoses can arise on any area of skin, covered or uncovered, with the usual exception of the palms and soles  They do not arise from mucous membranes  Seborrheic keratoses can have highly variable appearance  Seborrheic keratoses are extremely common  It has been estimated that over 90% of adults over the age of 61 years have one or more of them   They occur in males and females of all races, typically beginning to erupt in the 30s or 40s  They are uncommon under the age of 21 years  The precise cause of seborrhoeic keratoses is not known  Seborrhoeic keratoses are considered degenerative in nature  As time goes by, seborrheic keratoses tend to become more numerous  Some people inherit a tendency to develop a very large number of them; some people may have hundreds of them  There is no easy way to remove multiple lesions on a single occasion  Unless a specific lesion is "inflamed" and is causing pain or stinging/burning or is bleeding, most insurance companies do not authorize treatment      Scribe Attestation    I,:  Crystal Cooper MA am acting as a scribe while in the presence of the attending physician :       I,:  Gregory Peres MD personally performed the services described in this documentation    as scribed in my presence :

## 2022-12-08 ENCOUNTER — TELEPHONE (OUTPATIENT)
Dept: HEMATOLOGY ONCOLOGY | Facility: CLINIC | Age: 67
End: 2022-12-08

## 2022-12-08 NOTE — TELEPHONE ENCOUNTER
Called and spoke with patient  He stopped his Encorafenib and Binimetinib on Monday 12/12  He states he was experiencing abdominal cramps, constipation, dizziness and headaches  Since stopping the medications he is feeling much better  Offered patient an appointment on Monday 12/12 but he is unable to come in  Patient scheduled to see Dr Shannon Jack on Wednesday 12/14 to discuss further

## 2022-12-08 NOTE — TELEPHONE ENCOUNTER
CALL RETURN FORM   Reason for patient call? Patient calling about medication, STOPPED TAKING due to side effects   Patient's primary oncologist?  Suman Balbuena   Name of person the patient was calling for? Kofi Davey   Any additional information to add, if applicable? [phone]   Informed patient that the message will be forwarded to the team and someone will get back to them as soon as possible    Did you relay this information to the patient?   yes

## 2022-12-08 NOTE — PROGRESS NOTES
[facility]ATOLOGY ONCOL[facility] SPECIALISTS [address]  [place] [address]  [phone]     Date of Visit: 11/30/2022  Name: Anu Solano   YOB: 1955        Subjective    VISIT DIAGNOSIS:  Diagnoses and all orders for this visit:    Malignant melanoma of leg, right ([address] )  -     CBC and differential; Future  -     Comprehensive metabolic panel; Future  -     LD,Blood; Future  -     ECG 12 lead; Future    High risk medication use  -     CBC and differential; Future  -     Comprehensive metabolic panel; Future  -     LD,Blood; Future  -     ECG 12 lead; Future        Oncology History   Malignant melanoma of leg, right ([address] )   5/31/2022 Biopsy    Right Leg, Shave Biopsy   Melanoma extending to the deep specimen margin  Thickness: 7 0mm  Ulceration: not seen   Mitoses: 3      5/31/2022 -  Cancer Staged    Staging form: Melanoma of the Skin, AJCC 8th Edition  - Clinical stage from 5/31/2022: Stage IIB (cT4a, cN0, cM0) - Signed by Alec Humphrey MD on 8/25/2022 7/1/2022 Initial Diagnosis    Malignant melanoma of leg, right ([address] )     7/29/2022 Surgery    A  Lymph node, sentinel, #1:  One lymph node with rare metastatic melanoma cells (1/1), subcapsular location  Immunohistochemical study for MART-1, HMB45 and S100 supports the findings  B  Leg, right, knee, wide excision:  Residual melanoma, nodular type, and scar; margins free  See synoptic report       7/29/2022 -  Cancer Staged    Staging form: Melanoma of the Skin, AJCC 8th Edition  - Pathologic stage from 7/29/2022: Stage IIIC (pT4a, pN1a, cM0) - Signed by Alec Humphrey MD on 8/25/2022          Cancer Staging   Malignant melanoma of leg, right [facility])  Staging form: Melanoma of the Skin, AJCC 8th Edition  - Clinical stage from 5/31/2022: Stage IIB (cT4a, cN0, cM0) - Signed by Alec Humphrey MD on 8/25/2022  - Pathologic stage from 7/29/2022: Stage IIIC (pT4a, pN1a, cM0) - Signed by Alec Humphrey MD on 8/25/2022     Treatment Details   Treatment goal [No plan goal]   Plan Name ONE-TIME BLOOD PRODUCT TRANSFUSION PLAN   Status Active   Start Date 7/15/2022   End Date Until discontinued   Provider VISHNU Conner   Chemotherapy [No matching medication found in this treatment plan]     Treatment Details   Treatment goal Palliative   Plan Name OP RiTUXimab weekly x 4, every 6 months   Status Active   Start Date 8/1/2022   End Date 8/23/2022   Provider Mague Dang DO   Chemotherapy riTUXimab (RITUXAN) subsequent titrated chemo infusion, 375 mg/m2 = 746 2 mg, Intravenous, Once, 1 of 1 cycle  Administration: 746 2 mg (8/9/2022), 746 2 mg (8/16/2022), 746 2 mg (8/23/2022)    riTUXimab (RITUXAN) first titrated chemo infusion, 375 mg/m2 = 746 2 mg, Intravenous, Once, 1 of 1 cycle  Administration: 746 2 mg (8/1/2022)          HISTORY OF PRESENT ILLNESS: Anu Solano is a 75 y o  male  who is a 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 after initiating treatment with adjuvant encorafenib and binimetinib  Started treatment on 11/15/2022  He did call in on 11/17/2022 stating that he developed nausea, dizziness and headaches  He did state the dizziness was constant making it hard to get up and down the steps  He has been eating while taking his medications  We did send him in nausea medication  He has been doing better on the medication and has no complaints in the office today  He does state he can handle the way he feels  Dizziness has subsided  Denies any additional side effects associated with targeted therapy  He denies rash, itching, migratory arthralgias, swelling, chest pain, shortness of breath, and diarrhea  Get an EKG which was unchanged from prior  No significant changes in QTC  REVIEW OF SYSTEMS:  Review of Systems   Constitutional: Negative for appetite change, fatigue, fever and unexpected weight change     HENT:   Negative for lump/mass  Eyes: Negative for icterus  Respiratory: Negative for cough, shortness of breath and wheezing  Cardiovascular: Negative for leg swelling  Gastrointestinal: Positive for nausea  Negative for abdominal pain, constipation, diarrhea and vomiting  Genitourinary: Negative for difficulty urinating and hematuria  Musculoskeletal: Negative for arthralgias, gait problem and myalgias  Skin: Negative for itching and rash  No new, changing, or concerning lesions  Neurological: Positive for dizziness (resolved) and headaches (resolved)  Negative for extremity weakness, gait problem, light-headedness and numbness  Hematological: Negative for adenopathy          MEDICATIONS:    Current Outpatient Medications:   •  acetaminophen (TYLENOL) 325 mg tablet, Take 2 tablets (650 mg total) by mouth every 6 (six) hours as needed for mild pain, Disp:  , Rfl: 0  •  ALPRAZolam (XANAX) 0 5 mg tablet, Take 1 tablet (0 5 mg total) by mouth 2 (two) times a day Take one two hours before scan and second one 30 min before, Disp: 2 tablet, Rfl: 0  •  benzonatate (TESSALON PERLES) 100 mg capsule, Take 1 capsule (100 mg total) by mouth 3 (three) times a day, Disp: 20 capsule, Rfl: 0  •  binimetinib (MEKTOVI) 15 MG tablet, Take 3 tablets (45 mg total) by mouth every 12 (twelve) hours, Disp: 180 tablet, Rfl: 5  •  dabigatran etexilate (PRADAXA) 150 mg capsu, Take 1 capsule (150 mg total) by mouth every 12 (twelve) hours, Disp: 60 capsule, Rfl: 0  •  encorafenib (BRAFTOVI) 75 MG capsule, Take 6 capsules (450 mg total) by mouth daily, Disp: 180 capsule, Rfl: 5  •  furosemide (LASIX) 20 mg tablet, Take 1 tablet (20 mg total) by mouth daily, Disp: 5 tablet, Rfl: 0  •  hydrochlorothiazide (HYDRODIURIL) 25 mg tablet, Take 1 tablet (25 mg total) by mouth daily (Patient taking differently: Take 25 mg by mouth every morning), Disp: 30 tablet, Rfl: 5  •  metoprolol succinate (TOPROL-XL) 25 mg 24 hr tablet, Take 0 5 tablets (12 5 mg total) by mouth daily (Patient taking differently: Take 12 5 mg by mouth every morning), Disp: 30 tablet, Rfl: 5  •  ondansetron (ZOFRAN) 4 mg tablet, Take 1 tablet (4 mg total) by mouth every 8 (eight) hours as needed for nausea or vomiting, Disp: 20 tablet, Rfl: 0  •  pantoprazole (PROTONIX) 40 mg tablet, Take 1 tablet (40 mg total) by mouth daily (Patient taking differently: Take 40 mg by mouth every morning), Disp: 90 tablet, Rfl: 3  •  potassium chloride (K-DUR,KLOR-CON) 20 mEq tablet, Take 1 tablet (20 mEq total) by mouth daily (Patient taking differently: Take 20 mEq by mouth every morning), Disp: 30 tablet, Rfl: 3  •  prazosin (MINIPRESS) 1 mg capsule, Take 1 mg by mouth daily at bedtime , Disp: , Rfl:   •  predniSONE 20 mg tablet, Take 2 tablets (40 mg total) by mouth daily, Disp: 60 tablet, Rfl: 0  •  sodium chloride, PF, 0 9 %, 10 mL by Intracatheter route daily Intracatheter flushing daily   May substitute prefilled syringe with normal saline 10 mL vials, 10 mL syringes, and 18 g blunt needles, Disp: 300 mL, Rfl: 0  •  sulfamethoxazole-trimethoprim (BACTRIM DS) 800-160 mg per tablet, Take 1 tablet by mouth 3 (three) times a week Monday, Wednesday and Friday, Disp: 12 tablet, Rfl: 0  •  VITAMIN D PO, Take 1,000 Units by mouth daily , Disp: , Rfl:   •  ascorbic acid (VITAMIN C) 1000 MG tablet, Take 1 tablet (1,000 mg total) by mouth every 12 (twelve) hours for 6 doses (Patient taking differently: Take 1,000 mg by mouth daily Every other day), Disp: 6 tablet, Rfl: 0     ALLERGIES:  No Known Allergies     ACTIVE PROBLEMS:  Patient Active Problem List   Diagnosis   • 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   • Hyperbilirubinemia   • Symptomatic anemia   • Pulmonary embolism with acute cor pulmonale (HCC)   • Portal vein thrombosis   • Elevated blood pressure reading without diagnosis of hypertension   • Shortness of breath   • Gastroesophageal reflux disease   • Autoimmune hemolytic anemia   • ARABELLA (acute kidney injury) ([facility] )   • Splenomegaly   • Iron overload due to repeated red blood cell transfusions   • Posttraumatic stress disorder   • Essential hypertension   • Glucose intolerance (impaired glucose tolerance)   • Renal insufficiency   • Auto immune neutropenia (HCC)   • Primary osteoarthritis of left knee   • Pain in joint, lower leg   • Pain in left knee   • COVID-19   • Thrombocytosis   • Primary localized osteoarthrosis of the knee, right   • Hyperglycemia   • Chronic bilateral low back pain with bilateral sciatica   • Hypercholesterolemia   • Malignant melanoma of leg, right (HCC)   • Dyspnea   • Acute respiratory failure with hypoxia (HCC)   • Elevated serum creatinine   • Elevated bilirubin   • Leukocytosis   • Lymphocele after surgical procedure          PAST MEDICAL HISTORY:   Past Medical History:   Diagnosis Date   • Anemia 11/2/2016   • Anxiety    • Arthritis    • Autoimmune hemolytic anemia (HCC)    • Claustrophobia    • COVID 12/2020   • DVT (deep venous thrombosis) (HCC)    • GERD (gastroesophageal reflux disease)    • Hearing loss, right    • Hemolytic anemia (HCC)    • History of transfusion     2018 - no adverse reaction   • Hypertension    • Malignant melanoma of leg, right ([facility] ) 7/1/2022   • Palpitation    • Portal vein thrombosis    • PTSD (post-traumatic stress disorder)    • Pulmonary emboli (HCC)    • Tobacco abuse         PAST SURGICAL HISTORY:  Past Surgical History:   Procedure Laterality Date   • CATARACT EXTRACTION Bilateral    • CHOLECYSTECTOMY  07/18/2017   • COLONOSCOPY     • ELBOW ARTHROPLASTY Left     bursectomy   • IR DRAINAGE TUBE CHECK WITH SCLEROSIS  10/06/2022   • IR DRAINAGE TUBE CHECK WITH SCLEROSIS  10/10/2022   • IR DRAINAGE TUBE CHECK WITH SCLEROSIS  10/20/2022   • IR DRAINAGE TUBE CHECK WITH SCLEROSIS  10/27/2022   • IR DRAINAGE TUBE CHECK WITH SCLEROSIS  11/04/2022   • IR DRAINAGE TUBE CHECK WITH SCLEROSIS  11/15/2022   • IR DRAINAGE TUBE CHECK WITH SCLEROSIS 11/25/2022   • IR DRAINAGE TUBE PLACEMENT  09/19/2022   • JOINT REPLACEMENT Right 02/02/2021    knee   • KNEE SURGERY Right     meniscus tear   • LYMPH NODE BIOPSY Right 07/29/2022    Procedure: BIOPSY LYMPH NODE SENTINEL;  Surgeon: Diego Humphrey MD;  [facility];  Service: Surgical Oncology   • ND LAP,CHOLECYSTECTOMY/GRAPH N/A 12/23/2017    Procedure: CHOLECYSTECTOMY LAPAROSCOPIC with cholangiogram;  Surgeon: Kvng Yañez MD;  Location: [facility];  Service: General   • ND REMOVAL SPLEEN, TOTAL N/A 05/18/2017    Procedure: LAPAROSCOPIC HAND ASSIST SPLENECTOMY;  Surgeon: Jhony Gloria MD;  [facility];  Service: Surgical Oncology   • ND TOTAL KNEE ARTHROPLASTY Right 02/02/2021    Procedure: ARTHROPLASTY KNEE TOTAL;  Surgeon: Porter Marie MD;  [facility];  Service: Orthopedics   • ND TOTAL KNEE ARTHROPLASTY Left 11/17/2021    Procedure: TOTAL KNEE REPLACEMENT;  Surgeon: Porter Marie MD;  [facility]: [facility];  Service: Orthopedics   • SHOULDER SURGERY Left     rotator cuff x4, reconstruction   • SKIN BIOPSY  5 May 2022   • SKIN CANCER EXCISION  29 July 2022   • SKIN LESION EXCISION Right 07/29/2022    Procedure: WIDE EXCISION RIGHT MEDIAL THIGH;  Surgeon: Diego Humphrey MD;  [facility];  Service: Surgical Oncology        SOCIAL HISTORY:  Social History     Socioeconomic History   • Marital status: /Civil Union     Spouse name: None   • Number of children: None   • Years of education: None   • Highest education level: None   Occupational History   • None   Tobacco Use   • Smoking status: Some Days     Packs/day: 0 00     Years: 5 00     Pack years: 0 00     Types: Cigars, Cigarettes   • Smokeless tobacco: Current     Types: Snuff   • Tobacco comments:     5  a week average   Vaping Use   • Vaping Use: Never used   Substance and Sexual Activity   • Alcohol use:  Yes     Alcohol/week: 6 0 standard drinks     Types: 6 Cans of beer per week     Comment: socially • Drug use: Yes     Types: Marijuana     Comment: medical marijuana   • Sexual activity: Yes     Partners: Female     Birth control/protection: None   Other Topics Concern   • None   Social History Narrative    Daily coffee consumption (2 cups/day)    reitred     Social Determinants of Health     Financial Resource Strain: Not on file   Food Insecurity: No Food Insecurity   • Worried About Running Out of Food in the Last Year: Never true   • Ran Out of Food in the Last Year: Never true   Transportation Needs: No Transportation Needs   • Lack of Transportation (Medical): No   • Lack of Transportation (Non-Medical): No   Physical Activity: Not on file   Stress: Not on file   Social Connections: Not on file   Intimate Partner Violence: Not on file   Housing Stability: Low Risk    • Unable to Pay for Housing in the Last Year: No   • Number of Places Lived in the Last Year: 1   • Unstable Housing in the Last Year: No        FAMILY HISTORY:  Family History   Problem Relation Age of Onset   • Cancer Mother    • Breast cancer Mother    • Other Father         CABG   • Heart attack Paternal Grandfather         acute MI           Objective    PHYSICAL EXAMINATION:   Blood pressure 130/70, pulse 93, temperature 98 °F (36 7 °C), resp  rate 16, height 5' 7" (1 702 m), weight 88 kg (194 lb), SpO2 98 %  Pain Score: 0-No pain     ECOG Performance Status    Flowsheet Row Most Recent Value   ECOG Performance Status 0 - Fully active, able to carry on all pre-disease performance without restriction             Physical Exam  Constitutional:       General: He is not in acute distress  Appearance: Normal appearance  He is not toxic-appearing  HENT:      Mouth/Throat:      Mouth: Mucous membranes are moist       Pharynx: Oropharynx is clear  Eyes:      General: No scleral icterus  Cardiovascular:      Rate and Rhythm: Normal rate and regular rhythm  Pulses: Normal pulses  Heart sounds: No murmur heard      No friction rub  No gallop  Pulmonary:      Effort: Pulmonary effort is normal  No respiratory distress  Breath sounds: Normal breath sounds  No wheezing or rales  Abdominal:      General: There is no distension  Palpations: There is no mass  Tenderness: There is no abdominal tenderness  There is no rebound  Musculoskeletal:         General: No swelling or tenderness  Right lower leg: No edema  Left lower leg: No edema  Lymphadenopathy:      Head:      Right side of head: No submandibular, preauricular or posterior auricular adenopathy  Left side of head: No submandibular, preauricular or posterior auricular adenopathy  Cervical: No cervical adenopathy  Right cervical: No superficial or posterior cervical adenopathy  Left cervical: No superficial or posterior cervical adenopathy  Upper Body:      Right upper body: No supraclavicular or axillary adenopathy  Left upper body: No supraclavicular or axillary adenopathy  Skin:     Findings: No rash  Comments: No concerning skin lesions  Neurological:      General: No focal deficit present  Mental Status: He is alert and oriented to person, place, and time  Psychiatric:         Mood and Affect: Mood normal          Behavior: Behavior normal          Thought Content: Thought content normal          Judgment: Judgment normal          I reviewed lab data in the chart      WBC   Date Value Ref Range Status   11/28/2022 12 42 (H) 4 31 - 10 16 Thousand/uL Final   11/11/2022 12 33 (H) 4 31 - 10 16 Thousand/uL Final   11/03/2022 14 07 (H) 4 31 - 10 16 Thousand/uL Final     Hemoglobin   Date Value Ref Range Status   11/28/2022 14 8 12 0 - 17 0 g/dL Final   11/11/2022 14 7 12 0 - 17 0 g/dL Final   11/03/2022 15 0 12 0 - 17 0 g/dL Final     Platelets   Date Value Ref Range Status   11/28/2022 535 (H) 149 - 390 Thousands/uL Final   11/11/2022 433 (H) 149 - 390 Thousands/uL Final   11/03/2022 439 (H) 149 - 390 Thousands/uL Final     MCV   Date Value Ref Range Status   11/28/2022 108 (H) 82 - 98 fL Final   11/11/2022 109 (H) 82 - 98 fL Final   11/03/2022 109 (H) 82 - 98 fL Final      Potassium   Date Value Ref Range Status   11/28/2022 3 6 3 5 - 5 3 mmol/L Final   10/21/2022 3 0 (L) 3 5 - 5 3 mmol/L Final   10/05/2022 4 1 3 5 - 5 3 mmol/L Final     Chloride   Date Value Ref Range Status   11/28/2022 106 96 - 108 mmol/L Final   10/21/2022 108 96 - 108 mmol/L Final   10/05/2022 105 96 - 108 mmol/L Final     CO2   Date Value Ref Range Status   11/28/2022 28 21 - 32 mmol/L Final   10/21/2022 25 21 - 32 mmol/L Final   10/05/2022 30 21 - 32 mmol/L Final     CO2, i-STAT   Date Value Ref Range Status   05/18/2017 26 21 - 32 mmol/L Final     BUN   Date Value Ref Range Status   11/28/2022 22 5 - 25 mg/dL Final   10/21/2022 23 5 - 25 mg/dL Final   10/05/2022 24 5 - 25 mg/dL Final     Creatinine   Date Value Ref Range Status   11/28/2022 1 44 (H) 0 60 - 1 30 mg/dL Final     Comment:     Standardized to IDMS reference method   10/21/2022 1 16 0 60 - 1 30 mg/dL Final     Comment:     Standardized to IDMS reference method   10/05/2022 1 20 0 60 - 1 30 mg/dL Final     Comment:     Standardized to IDMS reference method     Glucose   Date Value Ref Range Status   10/05/2022 88 65 - 140 mg/dL Final     Comment:     If the patient is fasting, the ADA then defines impaired fasting glucose as > 100 mg/dL and diabetes as > or equal to 123 mg/dL  Specimen collection should occur prior to Sulfasalazine administration due to the potential for falsely depressed results  Specimen collection should occur prior to Sulfapyridine administration due to the potential for falsely elevated results  09/07/2022 134 65 - 140 mg/dL Final     Comment:     If the patient is fasting, the ADA then defines impaired fasting glucose as > 100 mg/dL and diabetes as > or equal to 123 mg/dL    Specimen collection should occur prior to Sulfasalazine administration due to the potential for falsely depressed results  Specimen collection should occur prior to Sulfapyridine administration due to the potential for falsely elevated results  08/30/2022 126 65 - 140 mg/dL Final     Comment:     If the patient is fasting, the ADA then defines impaired fasting glucose as > 100 mg/dL and diabetes as > or equal to 123 mg/dL  Specimen collection should occur prior to Sulfasalazine administration due to the potential for falsely depressed results  Specimen collection should occur prior to Sulfapyridine administration due to the potential for falsely elevated results  Calcium   Date Value Ref Range Status   11/28/2022 9 7 8 3 - 10 1 mg/dL Final   10/21/2022 9 5 8 3 - 10 1 mg/dL Final   10/05/2022 9 5 8 3 - 10 1 mg/dL Final     Albumin   Date Value Ref Range Status   11/28/2022 3 3 (L) 3 5 - 5 0 g/dL Final   10/21/2022 3 2 (L) 3 5 - 5 0 g/dL Final   10/05/2022 3 6 3 5 - 5 0 g/dL Final     Total Bilirubin   Date Value Ref Range Status   11/28/2022 0 56 0 20 - 1 00 mg/dL Final     Comment:     Use of this assay is not recommended for patients undergoing treatment with eltrombopag due to the potential for falsely elevated results  10/21/2022 0 47 0 20 - 1 00 mg/dL Final     Comment:     Use of this assay is not recommended for patients undergoing treatment with eltrombopag due to the potential for falsely elevated results  10/05/2022 0 70 0 20 - 1 00 mg/dL Final     Comment:     Use of this assay is not recommended for patients undergoing treatment with eltrombopag due to the potential for falsely elevated results  Alkaline Phosphatase   Date Value Ref Range Status   11/28/2022 62 46 - 116 U/L Final   10/21/2022 54 46 - 116 U/L Final   10/05/2022 55 46 - 116 U/L Final     AST   Date Value Ref Range Status   11/28/2022 22 5 - 45 U/L Final     Comment:     Specimen collection should occur prior to Sulfasalazine administration due to the potential for falsely depressed results  10/21/2022 20 5 - 45 U/L Final     Comment:     Specimen collection should occur prior to Sulfasalazine administration due to the potential for falsely depressed results  10/05/2022 19 5 - 45 U/L Final     Comment:     Specimen collection should occur prior to Sulfasalazine administration due to the potential for falsely depressed results  ALT   Date Value Ref Range Status   11/28/2022 31 12 - 78 U/L Final     Comment:     Specimen collection should occur prior to Sulfasalazine and/or Sulfapyridine administration due to the potential for falsely depressed results  10/21/2022 34 12 - 78 U/L Final     Comment:     Specimen collection should occur prior to Sulfasalazine and/or Sulfapyridine administration due to the potential for falsely depressed results  10/05/2022 30 12 - 78 U/L Final     Comment:     Specimen collection should occur prior to Sulfasalazine and/or Sulfapyridine administration due to the potential for falsely depressed results  LD   Date Value Ref Range Status   11/28/2022 262 (H) 81 - 234 U/L Final   10/21/2022 236 (H) 81 - 234 U/L Final   07/30/2022 1,077 (H) 81 - 234 U/L Final     No results found for: TSH  No results found for: [id number]   Free T4   Date Value Ref Range Status   11/28/2022 0 66 (L) 0 76 - 1 46 ng/dL Final     Comment:     Specimen collection should occur prior to Sulfasalazine administration due to the potential for falsely elevated results  RECENT IMAGING:  Procedure: IR drainage tube check with sclerosis    Result Date: 11/29/2022  Narrative: Seroma tube check Clinical History: Fentanyl right inguinal lymph node excisional biopsy, complicated by right inguinal lymphocele  Contrast: 1 mL of iohexol (OMNIPAQUE) Fluoro time: 0 3 Minutes Number of Images: Multiple Radiation dose: 11 mGy Technique: The patient was brought to the interventional radiology suite and placed supine on the table   After a  view was obtained, contrast was injected into the seroma drainage catheter and multiple images were obtained  Findings: Resolution of right inguinal seroma cavity  Impression: Impression: Removal of a drainage catheter  Signed, performed, and dictated by VISHNU Morgan under the supervision of Dr Eder Menezes  Workstation performed: [id number]     Procedure: IR drainage tube check with sclerosis    Result Date: 11/15/2022  Narrative: PROCEDURE: Drainage catheter check with catheter exchange and sclerotherapy Procedural Personnel Attending physician(s): Dr Charis Romano Pre-procedure diagnosis: Melanoma Post-procedure diagnosis: Same Indication: Patient with history of right knee melanoma status post wide local excision with sentinel right inguinal lymph node excisional biopsy, complicated by right inguinal lymphocele status post drainage catheter placement 2 months ago returns for repeat drain check  PROCEDURE SUMMARY: - Drainage catheter check under fluoroscopic guidance - Drainage catheter exchange - Right inguinal lymphocele sclerotherapy PROCEDURE DETAILS: Pre-procedure Consent: Existing informed consent was utilized and time-out was performed prior to the procedure  Preparation: The site was prepared and draped using maximal sterile barrier technique including cutaneous antisepsis  Anesthesia/sedation Level of anesthesia/sedation: Local lidocaine Drainage catheter check and exchange Contrast injection through the existing catheter showed persistent lymphocele cavity  Catheter appeared partially retracted  Existing catheter was exchanged for a new 10 [employer] Janice Brown-Cash catheter over a Bentson wire  Catheter was secured to skin using 0 point suture  Sclerotherapy performed using 15 mL Betadine  Contrast Contrast agent: Omnipaque Contrast volume (mL): 10 Radiation Dose Fluoroscopy time (minutes): 1 1  Reference air kerma (mGy): 35 Additional Details Estimated blood loss (mL): None Complications: No immediate complications  Impression: 1   Drainage catheter check showed persistent small lymphocele cavity  2  Existing drainage catheter was partially retracted  Catheter was exchanged for a new 10 [employer] Janice Brown-Cash catheter  3  Sclerotherapy performed using 15 mL of Betadine  Plan: - Patient to uncap drainage catheter and connect to bulb suction drainage in 2 hours  - Return in one week for repeat drain check  Workstation performed: [id number]             Assessment/Plan  Mr Eva Ram is a 75 yr male history of hemolytic anemia requiring rituximab and stage IIIc melanoma who began treatment with adjuvant encorafenib and binimetinib 2 weeks ago here for follow-up  1  Malignant melanoma of leg, right ([facility] )  Tolerating treatment with encorafenib and binimetinib  Initially had some issues but these have mainly resolved  He is taking antinausea medication as needed  EKG and labs reviewed and okay to continue treatment  Return in 2 weeks for follow-up  He will have another EKG and blood work at that time  All orders placed and prescription provided  He knows to call with any issues or concerns prior to his next visit  - CBC and differential; Future  - Comprehensive metabolic panel; Future  - LD,Blood; Future  - ECG 12 lead; Future    2  High risk medication use  He is on treatment with targeted therapy and we will continue to monitor for side effects and lab abnormalities  We will monitor labs with each visit to ensure safety of continuing on treatment  He knows to watch for signs and symptoms for side effects  Continue to monitor for cardiac toxicity from these medications as well  He will return in 2 weeks with blood work as well as EKG  - CBC and differential; Future  - Comprehensive metabolic panel; Future  - LD,Blood; Future  - ECG 12 lead; Future      Return in about 2 weeks (around 12/14/2022)       Adriana Berger MD, PhD

## 2022-12-12 ENCOUNTER — APPOINTMENT (OUTPATIENT)
Dept: LAB | Facility: MEDICAL CENTER | Age: 67
End: 2022-12-12

## 2022-12-12 ENCOUNTER — APPOINTMENT (OUTPATIENT)
Dept: URGENT CARE | Facility: MEDICAL CENTER | Age: 67
End: 2022-12-12

## 2022-12-12 DIAGNOSIS — C43.71 MALIGNANT MELANOMA OF LEG, RIGHT (HCC): ICD-10-CM

## 2022-12-12 DIAGNOSIS — Z79.899 HIGH RISK MEDICATION USE: ICD-10-CM

## 2022-12-12 LAB
ALBUMIN SERPL BCP-MCNC: 4 G/DL (ref 3.5–5)
ALP SERPL-CCNC: 69 U/L (ref 46–116)
ALT SERPL W P-5'-P-CCNC: 48 U/L (ref 12–78)
ANION GAP SERPL CALCULATED.3IONS-SCNC: 3 MMOL/L (ref 4–13)
AST SERPL W P-5'-P-CCNC: 36 U/L (ref 5–45)
ATRIAL RATE: 87 BPM
BASOPHILS # BLD AUTO: 0.17 THOUSANDS/ÂΜL (ref 0–0.1)
BASOPHILS NFR BLD AUTO: 1 % (ref 0–1)
BILIRUB SERPL-MCNC: 1.27 MG/DL (ref 0.2–1)
BUN SERPL-MCNC: 21 MG/DL (ref 5–25)
CALCIUM SERPL-MCNC: 10 MG/DL (ref 8.3–10.1)
CHLORIDE SERPL-SCNC: 106 MMOL/L (ref 96–108)
CO2 SERPL-SCNC: 30 MMOL/L (ref 21–32)
CREAT SERPL-MCNC: 1.21 MG/DL (ref 0.6–1.3)
EOSINOPHIL # BLD AUTO: 0.68 THOUSAND/ÂΜL (ref 0–0.61)
EOSINOPHIL NFR BLD AUTO: 5 % (ref 0–6)
ERYTHROCYTE [DISTWIDTH] IN BLOOD BY AUTOMATED COUNT: 15.9 % (ref 11.6–15.1)
GFR SERPL CREATININE-BSD FRML MDRD: 61 ML/MIN/1.73SQ M
GLUCOSE P FAST SERPL-MCNC: 117 MG/DL (ref 65–99)
HCT VFR BLD AUTO: 37 % (ref 36.5–49.3)
HGB BLD-MCNC: 12.6 G/DL (ref 12–17)
IMM GRANULOCYTES # BLD AUTO: 0.14 THOUSAND/UL (ref 0–0.2)
IMM GRANULOCYTES NFR BLD AUTO: 1 % (ref 0–2)
LDH SERPL-CCNC: 331 U/L (ref 81–234)
LYMPHOCYTES # BLD AUTO: 2.1 THOUSANDS/ÂΜL (ref 0.6–4.47)
LYMPHOCYTES NFR BLD AUTO: 14 % (ref 14–44)
MCH RBC QN AUTO: 37.4 PG (ref 26.8–34.3)
MCHC RBC AUTO-ENTMCNC: 34.1 G/DL (ref 31.4–37.4)
MCV RBC AUTO: 110 FL (ref 82–98)
MONOCYTES # BLD AUTO: 1.27 THOUSAND/ÂΜL (ref 0.17–1.22)
MONOCYTES NFR BLD AUTO: 9 % (ref 4–12)
NEUTROPHILS # BLD AUTO: 10.42 THOUSANDS/ÂΜL (ref 1.85–7.62)
NEUTS SEG NFR BLD AUTO: 70 % (ref 43–75)
NRBC BLD AUTO-RTO: 1 /100 WBCS
P AXIS: 47 DEGREES
PLATELET # BLD AUTO: 504 THOUSANDS/UL (ref 149–390)
PMV BLD AUTO: 10.7 FL (ref 8.9–12.7)
POTASSIUM SERPL-SCNC: 3.7 MMOL/L (ref 3.5–5.3)
PR INTERVAL: 154 MS
PROT SERPL-MCNC: 6.7 G/DL (ref 6.4–8.4)
QRS AXIS: 28 DEGREES
QRSD INTERVAL: 80 MS
QT INTERVAL: 376 MS
QTC INTERVAL: 452 MS
RBC # BLD AUTO: 3.37 MILLION/UL (ref 3.88–5.62)
SODIUM SERPL-SCNC: 139 MMOL/L (ref 135–147)
T WAVE AXIS: 44 DEGREES
VENTRICULAR RATE: 87 BPM
WBC # BLD AUTO: 14.78 THOUSAND/UL (ref 4.31–10.16)

## 2022-12-12 NOTE — RESULT ENCOUNTER NOTE
DERMATOPATHOLOGY RESULT NOTE    Results reviewed by ordering physician  Called patient to personally discuss results  Discussed results with patient  SCCIS at least on R dorsal hand  Will refer for Mohs  Process explained to patient  Instructions for Clinical Derm Team:   (remember to route Result Note to appropriate staff):    Call patient and schedule for MOHS    Result & Plan by Specimen:    Specimen A: malignant SCCIS, at least  Plan: [facility]      Component   Case Report  Surgical Pathology Report                         Case: [id number]                                   Authorizing Provider: Sammy Urbina MD     Collected:           12/06/2022 1514              Ordering Location:     [facility]      Received:            12/06/2022 1514                                   [address] [facility]                                                                Pathologist:           Sammy Urbina MD                                                       Specimen:    Skin, Other, Specimen A Right dorsal hand                                                Final Diagnosis  A  Skin, Right dorsal hand, Shave biopsy:  Squamous cell carcinoma in-situ, at least; extending to biopsy margins       Electronically signed by Sammy Urbina MD on 12/12/2022 at  9:03 AM  Additional Information   All reported additional testing was performed with appropriately reactive controls   These tests were developed and their performance characteristics determined by [facility] or appropriate performing facility, though some tests may be performed on tissues which have not been validated for performance characteristics (such as staining performed on alcohol exposed cell blocks and decalcified tissues)   Results should be interpreted with caution and in the context of the patients' clinical condition  These tests may not be cleared or approved by the [place]   Food and Drug Administration, though the FDA has determined that such clearance or approval is not necessary  These tests are used for clinical purposes and they should not be regarded as investigational or for research  This laboratory has been approved by CLIA 88, designated as a high-complexity laboratory and is qualified to perform these tests  Sanjana Martinez Description   A  The specimen is received in formalin, labeled with the patient's name and hospital number, and is designated " right dorsal hand"  The specimen consists of a shave biopsy of tan-gray skin measuring 1 0 x 0 9 x 0 1 cm  There is a 0 8 x 0 8 cm tan crusted plaque that involves the peripheral margin  The apparent margin of resection is inked green  The specimen is trisected along the short axis and entirely submitted between sponges in 1 cassette      Note: The estimated total formalin fixation time based upon information provided by the submitting clinician and the standard processing schedule is under 72 hours    RRavotti  Clinical Information   Specimen A Right dorsal hand; skin; shave biopsy; 75year old male with a 4 mm scaly papule; ddx squamous cell carcinoma verses other     Shai Cellar - Dr Isaac Macias

## 2022-12-14 ENCOUNTER — OFFICE VISIT (OUTPATIENT)
Dept: HEMATOLOGY ONCOLOGY | Facility: CLINIC | Age: 67
End: 2022-12-14

## 2022-12-14 VITALS
RESPIRATION RATE: 18 BRPM | WEIGHT: 202 LBS | HEIGHT: 68 IN | HEART RATE: 87 BPM | SYSTOLIC BLOOD PRESSURE: 130 MMHG | DIASTOLIC BLOOD PRESSURE: 80 MMHG | OXYGEN SATURATION: 99 % | BODY MASS INDEX: 30.62 KG/M2

## 2022-12-14 DIAGNOSIS — F41.9 ANXIETY: ICD-10-CM

## 2022-12-14 DIAGNOSIS — D59.10 AUTOIMMUNE HEMOLYTIC ANEMIA (HCC): ICD-10-CM

## 2022-12-14 DIAGNOSIS — C43.71 MALIGNANT MELANOMA OF LEG, RIGHT (HCC): Primary | ICD-10-CM

## 2022-12-14 DIAGNOSIS — R06.2 WHEEZING: ICD-10-CM

## 2022-12-14 RX ORDER — ALPRAZOLAM 0.5 MG/1
0.5 TABLET ORAL 2 TIMES DAILY
Qty: 2 TABLET | Refills: 0 | Status: SHIPPED | OUTPATIENT
Start: 2022-12-14

## 2022-12-14 NOTE — LETTER
December 14, 2022     Katelyn Cuba, [address] [facility],[address]  [address] [place] [address]    Patient: Sugey Mejia   YOB: 1955   Date of Visit: 12/14/2022       Dear Dr Stoney Carreon: Thank you for referring Virgen Jaime to me for evaluation  Below are my notes for this consultation  If you have questions, please do not hesitate to call me  I look forward to following your patient along with you  Sincerely,        Mandy Schroeder MD        CC: Rona Quick, MD Alma Cardona, MD Mandy Huizar MD  12/14/2022 10:37 AM  Sign when Signing Visit  [facility] [place] [address]  [phone]     Date of Visit: 12/14/2022  Name: Sugey Mejia   YOB: 1955        Subjective    VISIT DIAGNOSIS:  Diagnoses and all orders for this visit:    Malignant melanoma of leg, right ([facility] )  -     NM pet ct tumor imaging whole body; Future  -     ALPRAZolam (XANAX) 0 5 mg tablet; Take 1 tablet (0 5 mg total) by mouth 2 (two) times a day Take one two hours before scan and second one 30 min before    Wheezing    Autoimmune hemolytic anemia    Anxiety        Oncology History   Malignant melanoma of leg, right ([facility] )   5/31/2022 Biopsy    Right Leg, Shave Biopsy   Melanoma extending to the deep specimen margin  Thickness: 7 0mm  Ulceration: not seen   Mitoses: 3      5/31/2022 -  Cancer Staged    Staging form: Melanoma of the Skin, AJCC 8th Edition  - Clinical stage from 5/31/2022: Stage IIB (cT4a, cN0, cM0) - Signed by Mandy Schroeder MD on 8/25/2022 7/1/2022 Initial Diagnosis    Malignant melanoma of leg, right ([facility] )     7/29/2022 Surgery    A  Lymph node, sentinel, #1:  One lymph node with rare metastatic melanoma cells (1/1), subcapsular location  Immunohistochemical study for MART-1, HMB45 and S100 supports the findings           B  Leg, right, knee, wide excision:  Residual melanoma, nodular type, and scar; margins free  See synoptic report  7/29/2022 -  Cancer Staged    Staging form: Melanoma of the Skin, AJCC 8th Edition  - Pathologic stage from 7/29/2022: Stage IIIC (pT4a, pN1a, cM0) - Signed by Brooklyn Farris MD on 8/25/2022          Cancer Staging   Malignant melanoma of leg, right [facility])  Staging form: Melanoma of the Skin, AJCC 8th Edition  - Clinical stage from 5/31/2022: Stage IIB (cT4a, cN0, cM0) - Signed by Brooklyn Farris MD on 8/25/2022  - Pathologic stage from 7/29/2022: Stage IIIC (pT4a, pN1a, cM0) - Signed by Brooklyn Farris MD on 8/25/2022     Treatment Details   Treatment goal [No plan goal]   Plan Name ONE-TIME BLOOD PRODUCT TRANSFUSION PLAN   Status Active   Start Date 7/15/2022   End Date Until discontinued   Provider VISHNU Azul   Chemotherapy [No matching medication found in this treatment plan]     Treatment Details   Treatment goal Palliative   Plan Name OP RiTUXimab weekly x 4, every 6 months   Status Active   Start Date 8/1/2022   End Date 8/23/2022   Provider Jose Randall DO   Chemotherapy riTUXimab (RITUXAN) subsequent titrated chemo infusion, 375 mg/m2 = 746 2 mg, Intravenous, Once, 1 of 1 cycle  Administration: 746 2 mg (8/9/2022), 746 2 mg (8/16/2022), 746 2 mg (8/23/2022)    riTUXimab (RITUXAN) first titrated chemo infusion, 375 mg/m2 = 746 2 mg, Intravenous, Once, 1 of 1 cycle  Administration: 746 2 mg (8/1/2022)          HISTORY OF PRESENT ILLNESS: Kee Nicole is a 75 y o  male  who has stage IIIc melanoma here for follow-up  He had taken encorafenib and binimetinib for 2 weeks after having side effects the first time he tried it, but side effects continued and persisted  He felt horrible and dizzy and nauseous and did not wish to feel like that all the time, so he stopped the medication after being on it for 2 weeks  He also developed episodes of palpitations which were more apparent to him    He states one of them woke him up from sleep  This occurred while on the medication  He describes having a runny nose and congestion  Has been ongoing for some time  Denies any allergies  He also describes having wheezing, its more prominent at night when he is lying on his left side improved when he is on his right side  He will follow-up with his primary care doctor  He did get an EKG which was unchanged from prior and he has had blood work, that does show drop in hemoglobin  He will repeat blood work next week to continue to monitor to see how his hemoglobin is  He does have this underlying history of hemolytic anemia, that is exacerbated at times of physiologic stress  He did have a squamous cell in situ identified on his right hand for which he will undergo Mohs  Denies any new, changing, or concerning skin lesions at this time  Denies lymphadenopathy  REVIEW OF SYSTEMS:  Review of Systems   Constitutional: Negative for appetite change, fatigue, fever and unexpected weight change  HENT:   Negative for lump/mass  Eyes: Negative for icterus  Respiratory: Negative for cough, shortness of breath and wheezing  Cardiovascular: Negative for leg swelling  Gastrointestinal: Negative for abdominal pain, constipation, diarrhea, nausea and vomiting  Genitourinary: Negative for difficulty urinating and hematuria  Musculoskeletal: Negative for arthralgias, gait problem and myalgias  Skin: Negative for itching and rash  Scab from biopsy on his right hand with SCC that needs Mohs  No additional new, changing, or concerning lesions  Neurological: Negative for extremity weakness, gait problem, headaches, light-headedness and numbness  Hematological: Negative for adenopathy          MEDICATIONS:    Current Outpatient Medications:   •  acetaminophen (TYLENOL) 325 mg tablet, Take 2 tablets (650 mg total) by mouth every 6 (six) hours as needed for mild pain, Disp:  , Rfl: 0  • ALPRAZolam (XANAX) 0 5 mg tablet, Take 1 tablet (0 5 mg total) by mouth 2 (two) times a day Take one two hours before scan and second one 30 min before, Disp: 2 tablet, Rfl: 0  •  benzonatate (TESSALON PERLES) 100 mg capsule, Take 1 capsule (100 mg total) by mouth 3 (three) times a day, Disp: 20 capsule, Rfl: 0  •  dabigatran etexilate (PRADAXA) 150 mg capsu, Take 1 capsule (150 mg total) by mouth every 12 (twelve) hours, Disp: 60 capsule, Rfl: 0  •  furosemide (LASIX) 20 mg tablet, Take 1 tablet (20 mg total) by mouth daily, Disp: 5 tablet, Rfl: 0  •  hydrochlorothiazide (HYDRODIURIL) 25 mg tablet, Take 1 tablet (25 mg total) by mouth daily (Patient taking differently: Take 25 mg by mouth every morning), Disp: 30 tablet, Rfl: 5  •  metoprolol succinate (TOPROL-XL) 25 mg 24 hr tablet, Take 0 5 tablets (12 5 mg total) by mouth daily (Patient taking differently: Take 12 5 mg by mouth every morning), Disp: 30 tablet, Rfl: 5  •  ondansetron (ZOFRAN) 4 mg tablet, Take 1 tablet (4 mg total) by mouth every 8 (eight) hours as needed for nausea or vomiting, Disp: 20 tablet, Rfl: 0  •  pantoprazole (PROTONIX) 40 mg tablet, Take 1 tablet (40 mg total) by mouth daily (Patient taking differently: Take 40 mg by mouth every morning), Disp: 90 tablet, Rfl: 3  •  potassium chloride (K-DUR,KLOR-CON) 20 mEq tablet, Take 1 tablet (20 mEq total) by mouth daily (Patient taking differently: Take 20 mEq by mouth every morning), Disp: 30 tablet, Rfl: 3  •  prazosin (MINIPRESS) 1 mg capsule, Take 1 mg by mouth daily at bedtime , Disp: , Rfl:   •  predniSONE 20 mg tablet, Take 2 tablets (40 mg total) by mouth daily, Disp: 60 tablet, Rfl: 0  •  sodium chloride, PF, 0 9 %, 10 mL by Intracatheter route daily Intracatheter flushing daily   May substitute prefilled syringe with normal saline 10 mL vials, 10 mL syringes, and 18 g blunt needles, Disp: 300 mL, Rfl: 0  •  sulfamethoxazole-trimethoprim (BACTRIM DS) 800-160 mg per tablet, Take 1 tablet by mouth 3 (three) times a week Monday, Wednesday and Friday, Disp: 12 tablet, Rfl: 0  •  VITAMIN D PO, Take 1,000 Units by mouth daily , Disp: , Rfl:   •  ascorbic acid (VITAMIN C) 1000 MG tablet, Take 1 tablet (1,000 mg total) by mouth every 12 (twelve) hours for 6 doses (Patient taking differently: Take 1,000 mg by mouth daily Every other day), Disp: 6 tablet, Rfl: 0     ALLERGIES:  No Known Allergies     ACTIVE PROBLEMS:  Patient Active Problem List   Diagnosis   • 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   • Hyperbilirubinemia   • Symptomatic anemia   • Pulmonary embolism with acute cor pulmonale (HCC)   • Portal vein thrombosis   • Elevated blood pressure reading without diagnosis of hypertension   • Shortness of breath   • Gastroesophageal reflux disease   • Autoimmune hemolytic anemia   • ARABELLA (acute kidney injury) ([facility] )   • Splenomegaly   • Iron overload due to repeated red blood cell transfusions   • Posttraumatic stress disorder   • Essential hypertension   • Glucose intolerance (impaired glucose tolerance)   • Renal insufficiency   • Auto immune neutropenia (HCC)   • Primary osteoarthritis of left knee   • Pain in joint, lower leg   • Pain in left knee   • COVID-19   • Thrombocytosis   • Primary localized osteoarthrosis of the knee, right   • Hyperglycemia   • Chronic bilateral low back pain with bilateral sciatica   • Hypercholesterolemia   • Malignant melanoma of leg, right (HCC)   • Dyspnea   • Acute respiratory failure with hypoxia (HCC)   • Elevated serum creatinine   • Elevated bilirubin   • Leukocytosis   • Lymphocele after surgical procedure          PAST MEDICAL HISTORY:   Past Medical History:   Diagnosis Date   • Anemia 11/2/2016   • Anxiety    • Arthritis    • Autoimmune hemolytic anemia ([facility] )    • Claustrophobia    • COVID 12/2020   • DVT (deep venous thrombosis) ([facility])    • GERD (gastroesophageal reflux disease)    • Hearing loss, right    • Hemolytic anemia ([facility] )    • History of transfusion 2018 - no adverse reaction   • Hypertension    • Malignant melanoma of leg, right ([address] ) 7/1/2022   • Palpitation    • Portal vein thrombosis    • PTSD (post-traumatic stress disorder)    • Pulmonary emboli ([address] )    • Tobacco abuse         PAST SURGICAL HISTORY:  Past Surgical History:   Procedure Laterality Date   • CATARACT EXTRACTION Bilateral    • CHOLECYSTECTOMY  07/18/2017   • COLONOSCOPY     • ELBOW ARTHROPLASTY Left     bursectomy   • IR DRAINAGE TUBE CHECK WITH SCLEROSIS  10/06/2022   • IR DRAINAGE TUBE CHECK WITH SCLEROSIS  10/10/2022   • IR DRAINAGE TUBE CHECK WITH SCLEROSIS  10/20/2022   • IR DRAINAGE TUBE CHECK WITH SCLEROSIS  10/27/2022   • IR DRAINAGE TUBE CHECK WITH SCLEROSIS  11/04/2022   • IR DRAINAGE TUBE CHECK WITH SCLEROSIS  11/15/2022   • IR DRAINAGE TUBE CHECK WITH SCLEROSIS  11/25/2022   • IR DRAINAGE TUBE PLACEMENT  09/19/2022   • JOINT REPLACEMENT Right 02/02/2021    knee   • KNEE SURGERY Right     meniscus tear   • LYMPH NODE BIOPSY Right 07/29/2022    Procedure: BIOPSY LYMPH NODE SENTINEL;  Surgeon: Mariaelena Gilbert MD;  [facility];  Service: Surgical Oncology   • FL LAP,CHOLECYSTECTOMY/GRAPH N/A 12/23/2017    Procedure: CHOLECYSTECTOMY LAPAROSCOPIC with cholangiogram;  Surgeon: Larry Vasquez MD;  Location[facility];  Service: General   • FL REMOVAL SPLEEN, TOTAL N/A 05/18/2017    Procedure: LAPAROSCOPIC HAND ASSIST SPLENECTOMY;  Surgeon: Johnathan Silva MD;  [facility];  Service: Surgical Oncology   • FL TOTAL KNEE ARTHROPLASTY Right 02/02/2021    Procedure: ARTHROPLASTY KNEE TOTAL;  Surgeon: Payton Ordonez MD;  [facility];  Service: Orthopedics   • FL TOTAL KNEE ARTHROPLASTY Left 11/17/2021    Procedure: TOTAL KNEE REPLACEMENT;  Surgeon: Payton Ordonez MD;  [facility];  Service: Orthopedics   • SHOULDER SURGERY Left     rotator cuff x4, reconstruction   • SKIN BIOPSY  5 May 2022   • SKIN CANCER EXCISION  29 July 2022   • SKIN LESION EXCISION Right 07/29/2022    Procedure: WIDE EXCISION RIGHT MEDIAL THIGH;  Surgeon: Wally Hayden MD;  [facility]: [facility];  Service: Surgical Oncology        SOCIAL HISTORY:  Social History     Socioeconomic History   • Marital status: /Civil Union     Spouse name: None   • Number of children: None   • Years of education: None   • Highest education level: None   Occupational History   • None   Tobacco Use   • Smoking status: Some Days     Packs/day: 0 00     Years: 5 00     Pack years: 0 00     Types: Cigars, Cigarettes   • Smokeless tobacco: Current     Types: Snuff   • Tobacco comments:     5  a week average   Vaping Use   • Vaping Use: Never used   Substance and Sexual Activity   • Alcohol use: Yes     Alcohol/week: 6 0 standard drinks     Types: 6 Cans of beer per week     Comment: socially   • Drug use: Yes     Types: Marijuana     Comment: medical marijuana   • Sexual activity: Yes     Partners: Female     Birth control/protection: None   Other Topics Concern   • None   Social History Narrative    Daily coffee consumption (2 cups/day)    reitred     Social Determinants of Health     Financial Resource Strain: Not on file   Food Insecurity: No Food Insecurity   • Worried About Running Out of Food in the Last Year: Never true   • Ran Out of Food in the Last Year: Never true   Transportation Needs: No Transportation Needs   • Lack of Transportation (Medical): No   • Lack of Transportation (Non-Medical):  No   Physical Activity: Not on file   Stress: Not on file   Social Connections: Not on file   Intimate Partner Violence: Not on file   Housing Stability: Low Risk    • Unable to Pay for Housing in the Last Year: No   • Number of Places Lived in the Last Year: 1   • Unstable Housing in the Last Year: No        FAMILY HISTORY:  Family History   Problem Relation Age of Onset   • Cancer Mother    • Breast cancer Mother    • Other Father         CABG   • Heart attack Paternal Grandfather         acute MI Objective    PHYSICAL EXAMINATION:   Blood pressure 130/80, pulse 87, resp  rate 18, height 5' 8" (1 727 m), weight 91 6 kg (202 lb), SpO2 99 %  Pain Score: 0-No pain     ECOG Performance Status    Flowsheet Row Most Recent Value   ECOG Performance Status 0 - Fully active, able to carry on all pre-disease performance without restriction             Physical Exam  Constitutional:       General: He is not in acute distress  Appearance: Normal appearance  He is not toxic-appearing  HENT:      Mouth/Throat:      Mouth: Mucous membranes are moist       Pharynx: Oropharynx is clear  Eyes:      General: No scleral icterus  Cardiovascular:      Rate and Rhythm: Normal rate and regular rhythm  Pulses: Normal pulses  Heart sounds: No murmur heard  No friction rub  No gallop  Pulmonary:      Effort: Pulmonary effort is normal  No respiratory distress  Breath sounds: Normal breath sounds  No wheezing or rales  Abdominal:      General: There is no distension  Palpations: There is no mass  Tenderness: There is no abdominal tenderness  There is no rebound  Musculoskeletal:         General: No swelling or tenderness  Right lower leg: No edema  Left lower leg: No edema  Lymphadenopathy:      Head:      Right side of head: No submandibular, preauricular or posterior auricular adenopathy  Left side of head: No submandibular, preauricular or posterior auricular adenopathy  Cervical: No cervical adenopathy  Right cervical: No superficial or posterior cervical adenopathy  Left cervical: No superficial or posterior cervical adenopathy  Upper Body:      Right upper body: No supraclavicular or axillary adenopathy  Left upper body: No supraclavicular or axillary adenopathy  Skin:     Findings: No rash  Comments: Biopsy site on right hand  No additional concerning skin lesions  Neurological:      General: No focal deficit present        Mental Status: He is alert and oriented to person, place, and time  Psychiatric:         Mood and Affect: Mood normal          Behavior: Behavior normal          Thought Content: Thought content normal          Judgment: Judgment normal          I reviewed lab data in the chart      WBC   Date Value Ref Range Status   12/12/2022 14 78 (H) 4 31 - 10 16 Thousand/uL Final   11/28/2022 12 42 (H) 4 31 - 10 16 Thousand/uL Final   11/11/2022 12 33 (H) 4 31 - 10 16 Thousand/uL Final     Hemoglobin   Date Value Ref Range Status   12/12/2022 12 6 12 0 - 17 0 g/dL Final   11/28/2022 14 8 12 0 - 17 0 g/dL Final   11/11/2022 14 7 12 0 - 17 0 g/dL Final     Platelets   Date Value Ref Range Status   12/12/2022 504 (H) 149 - 390 Thousands/uL Final   11/28/2022 535 (H) 149 - 390 Thousands/uL Final   11/11/2022 433 (H) 149 - 390 Thousands/uL Final     MCV   Date Value Ref Range Status   12/12/2022 110 (H) 82 - 98 fL Final   11/28/2022 108 (H) 82 - 98 fL Final   11/11/2022 109 (H) 82 - 98 fL Final      Potassium   Date Value Ref Range Status   12/12/2022 3 7 3 5 - 5 3 mmol/L Final   11/28/2022 3 6 3 5 - 5 3 mmol/L Final   10/21/2022 3 0 (L) 3 5 - 5 3 mmol/L Final     Chloride   Date Value Ref Range Status   12/12/2022 106 96 - 108 mmol/L Final   11/28/2022 106 96 - 108 mmol/L Final   10/21/2022 108 96 - 108 mmol/L Final     CO2   Date Value Ref Range Status   12/12/2022 30 21 - 32 mmol/L Final   11/28/2022 28 21 - 32 mmol/L Final   10/21/2022 25 21 - 32 mmol/L Final     CO2, i-STAT   Date Value Ref Range Status   05/18/2017 26 21 - 32 mmol/L Final     BUN   Date Value Ref Range Status   12/12/2022 21 5 - 25 mg/dL Final   11/28/2022 22 5 - 25 mg/dL Final   10/21/2022 23 5 - 25 mg/dL Final     Creatinine   Date Value Ref Range Status   12/12/2022 1 21 0 60 - 1 30 mg/dL Final     Comment:     Standardized to IDMS reference method   11/28/2022 1 44 (H) 0 60 - 1 30 mg/dL Final     Comment:     Standardized to IDMS reference method   10/21/2022 1  16 0 60 - 1 30 mg/dL Final     Comment:     Standardized to IDMS reference method     Glucose   Date Value Ref Range Status   10/05/2022 88 65 - 140 mg/dL Final     Comment:     If the patient is fasting, the ADA then defines impaired fasting glucose as > 100 mg/dL and diabetes as > or equal to 123 mg/dL  Specimen collection should occur prior to Sulfasalazine administration due to the potential for falsely depressed results  Specimen collection should occur prior to Sulfapyridine administration due to the potential for falsely elevated results  09/07/2022 134 65 - 140 mg/dL Final     Comment:     If the patient is fasting, the ADA then defines impaired fasting glucose as > 100 mg/dL and diabetes as > or equal to 123 mg/dL  Specimen collection should occur prior to Sulfasalazine administration due to the potential for falsely depressed results  Specimen collection should occur prior to Sulfapyridine administration due to the potential for falsely elevated results  08/30/2022 126 65 - 140 mg/dL Final     Comment:     If the patient is fasting, the ADA then defines impaired fasting glucose as > 100 mg/dL and diabetes as > or equal to 123 mg/dL  Specimen collection should occur prior to Sulfasalazine administration due to the potential for falsely depressed results  Specimen collection should occur prior to Sulfapyridine administration due to the potential for falsely elevated results       Calcium   Date Value Ref Range Status   12/12/2022 10 0 8 3 - 10 1 mg/dL Final   11/28/2022 9 7 8 3 - 10 1 mg/dL Final   10/21/2022 9 5 8 3 - 10 1 mg/dL Final     Albumin   Date Value Ref Range Status   12/12/2022 4 0 3 5 - 5 0 g/dL Final   11/28/2022 3 3 (L) 3 5 - 5 0 g/dL Final   10/21/2022 3 2 (L) 3 5 - 5 0 g/dL Final     Total Bilirubin   Date Value Ref Range Status   12/12/2022 1 27 (H) 0 20 - 1 00 mg/dL Final     Comment:     Use of this assay is not recommended for patients undergoing treatment with eltrombopag due to the potential for falsely elevated results  11/28/2022 0 56 0 20 - 1 00 mg/dL Final     Comment:     Use of this assay is not recommended for patients undergoing treatment with eltrombopag due to the potential for falsely elevated results  10/21/2022 0 47 0 20 - 1 00 mg/dL Final     Comment:     Use of this assay is not recommended for patients undergoing treatment with eltrombopag due to the potential for falsely elevated results  Alkaline Phosphatase   Date Value Ref Range Status   12/12/2022 69 46 - 116 U/L Final   11/28/2022 62 46 - 116 U/L Final   10/21/2022 54 46 - 116 U/L Final     AST   Date Value Ref Range Status   12/12/2022 36 5 - 45 U/L Final     Comment:     Specimen collection should occur prior to Sulfasalazine administration due to the potential for falsely depressed results  11/28/2022 22 5 - 45 U/L Final     Comment:     Specimen collection should occur prior to Sulfasalazine administration due to the potential for falsely depressed results  10/21/2022 20 5 - 45 U/L Final     Comment:     Specimen collection should occur prior to Sulfasalazine administration due to the potential for falsely depressed results  ALT   Date Value Ref Range Status   12/12/2022 48 12 - 78 U/L Final     Comment:     Specimen collection should occur prior to Sulfasalazine and/or Sulfapyridine administration due to the potential for falsely depressed results  11/28/2022 31 12 - 78 U/L Final     Comment:     Specimen collection should occur prior to Sulfasalazine and/or Sulfapyridine administration due to the potential for falsely depressed results  10/21/2022 34 12 - 78 U/L Final     Comment:     Specimen collection should occur prior to Sulfasalazine and/or Sulfapyridine administration due to the potential for falsely depressed results         LD   Date Value Ref Range Status   12/12/2022 331 (H) 81 - 234 U/L Final   11/28/2022 262 (H) 81 - 234 U/L Final   10/21/2022 236 (H) 81 - 234 U/L Final No results found for: TSH  No results found for: [id number]   Free T4   Date Value Ref Range Status   11/28/2022 0 66 (L) 0 76 - 1 46 ng/dL Final     Comment:     Specimen collection should occur prior to Sulfasalazine administration due to the potential for falsely elevated results  RECENT IMAGING:  Procedure: IR drainage tube check with sclerosis    Result Date: 11/29/2022  Narrative: Seroma tube check Clinical History: Fentanyl right inguinal lymph node excisional biopsy, complicated by right inguinal lymphocele  Contrast: 1 mL of iohexol (OMNIPAQUE) Fluoro time: 0 3 Minutes Number of Images: Multiple Radiation dose: 11 mGy Technique: The patient was brought to the interventional radiology suite and placed supine on the table  After a  view was obtained, contrast was injected into the seroma drainage catheter and multiple images were obtained  Findings: Resolution of right inguinal seroma cavity  Impression: Impression: Removal of a drainage catheter  Signed, performed, and dictated by VISHNU Malhotra under the supervision of Dr Andrea Mondragon  Workstation performed: [id number]     Procedure: IR drainage tube check with sclerosis    Result Date: 11/15/2022  Narrative: PROCEDURE: Drainage catheter check with catheter exchange and sclerotherapy Procedural Personnel Attending physician(s): Dr Alessia Aceves Pre-procedure diagnosis: Melanoma Post-procedure diagnosis: Same Indication: Patient with history of right knee melanoma status post wide local excision with sentinel right inguinal lymph node excisional biopsy, complicated by right inguinal lymphocele status post drainage catheter placement 2 months ago returns for repeat drain check   PROCEDURE SUMMARY: - Drainage catheter check under fluoroscopic guidance - Drainage catheter exchange - Right inguinal lymphocele sclerotherapy PROCEDURE DETAILS: Pre-procedure Consent: Existing informed consent was utilized and time-out was performed prior to the procedure  Preparation: The site was prepared and draped using maximal sterile barrier technique including cutaneous antisepsis  Anesthesia/sedation Level of anesthesia/sedation: Local lidocaine Drainage catheter check and exchange Contrast injection through the existing catheter showed persistent lymphocele cavity  Catheter appeared partially retracted  Existing catheter was exchanged for a new 10 [employer] Janice Brown-Cash catheter over a Bentson wire  Catheter was secured to skin using 0 point suture  Sclerotherapy performed using 15 mL Betadine  Contrast Contrast agent: Omnipaque Contrast volume (mL): 10 Radiation Dose Fluoroscopy time (minutes): 1 1  Reference air kerma (mGy): 35 Additional Details Estimated blood loss (mL): None Complications: No immediate complications  Impression: 1  Drainage catheter check showed persistent small lymphocele cavity  2  Existing drainage catheter was partially retracted  Catheter was exchanged for a new 10 [employer]-Cash catheter  3  Sclerotherapy performed using 15 mL of Betadine  Plan: - Patient to uncap drainage catheter and connect to bulb suction drainage in 2 hours  - Return in one week for repeat drain check  Workstation performed: [id number]             Assessment/Plan  Mr Magui Katz is a 75 yr male with stage IIIc melanoma status post short treatment with encorafenib and binimetinib in the adjuvant setting here for follow-up and surveillance  1  Malignant melanoma of leg, right ([address] )  Attempted treatment with adjuvant therapy with encorafenib and binimetinib  However could not tolerate side effects from medications  They made him feel unwell  He stated he did not wish to feel like that  I discussed that in the adjuvant setting I am less likely to push medications and agree with holding them since he was feeling unwell and not tolerating the medication  I discussed it is a different situation when you are trying to treat disease that has spread      We discussed continuation of close follow-up and surveillance monitoring for disease recurrence or metastasis  He will continue to follow along with physical exams and blood work every 3 months until 3 years out from diagnosis, and then every 6 months for years 4 and year 5  Over this time  We discussed we will continue to scan every 6 months  He understands and is in agreement with the plan  He is due for follow-up in February with full body scans at that time  He has standing orders for blood work  He requested Xanax as he gets anxiety with scans  Orders placed  He knows to call with issues or concerns prior to his next visit  He will continue with dermatology follow-up  He is to undergo Mohs to remove squamous cell on his right hand  All orders placed and prescription provided  - [facility] pet ct tumor imaging whole body; Future  - ALPRAZolam (XANAX) 0 5 mg tablet; Take 1 tablet (0 5 mg total) by mouth 2 (two) times a day Take one two hours before scan and second one 30 min before  Dispense: 2 tablet; Refill: 0    2  Wheezing  Not apparent on exam today  He states its more when he is lying down on his left side  He will follow-up with his primary care doctor  No shortness of breath at this time  3  Autoimmune hemolytic anemia  Follows with Dr Gus Mclaughlin and has required rituximab in the past   He has had a hemoglobin drop over the past 2 weeks while on treatment with encorafenib and binimetinib  He will repeat blood work next week and we will assess his hemoglobin at that time  4  Anxiety  Gets anxiety with scans  Ordered Xanax to help with this  He is due for imaging in February  Return in about 2 months (around 2/14/2023) for Office Visit, labs, scans       Michael Churchill MD, PhD

## 2022-12-14 NOTE — PROGRESS NOTES
[facility] HEMATOLOGY ONCOLOGY SPECIALISTS [address]  [place] [facility] [id number] [phone]     Date of Visit: 12/14/2022  Name: Lanre Chandra   YOB: 1955        Subjective    VISIT DIAGNOSIS:  Diagnoses and all orders for this visit:    Malignant melanoma of leg, right ([address] )  -     NM pet ct tumor imaging whole body; Future  -     ALPRAZolam (XANAX) 0 5 mg tablet; Take 1 tablet (0 5 mg total) by mouth 2 (two) times a day Take one two hours before scan and second one 30 min before    Wheezing    Autoimmune hemolytic anemia    Anxiety        Oncology History   Malignant melanoma of leg, right ([address] )   5/31/2022 Biopsy    Right Leg, Shave Biopsy   Melanoma extending to the deep specimen margin  Thickness: 7 0mm  Ulceration: not seen   Mitoses: 3      5/31/2022 -  Cancer Staged    Staging form: Melanoma of the Skin, AJCC 8th Edition  - Clinical stage from 5/31/2022: Stage IIB (cT4a, cN0, cM0) - Signed by Garret Valle MD on 8/25/2022 7/1/2022 Initial Diagnosis    Malignant melanoma of leg, right ([address] )     7/29/2022 Surgery    A  Lymph node, sentinel, #1:  One lymph node with rare metastatic melanoma cells (1/1), subcapsular location  Immunohistochemical study for MART-1, HMB45 and S100 supports the findings  B  Leg, right, knee, wide excision:  Residual melanoma, nodular type, and scar; margins free  See synoptic report       7/29/2022 -  Cancer Staged    Staging form: Melanoma of the Skin, AJCC 8th Edition  - Pathologic stage from 7/29/2022: Stage IIIC (pT4a, pN1a, cM0) - Signed by Garret Valle MD on 8/25/2022          Cancer Staging   Malignant melanoma of leg, right [facility])  Staging form: Melanoma of the Skin, AJCC 8th Edition  - Clinical stage from 5/31/2022: Stage IIB (cT4a, cN0, cM0) - Signed by Garret Valle MD on 8/25/2022  - Pathologic stage from 7/29/2022: Stage IIIC (pT4a, pN1a, cM0) - Signed by Garret Valle MD on 8/25/2022     Treatment Details   Treatment goal [No plan goal]   Plan Name ONE-TIME BLOOD PRODUCT TRANSFUSION PLAN   Status Active   Start Date 7/15/2022   End Date Until discontinued   Provider VISHNU Martínez   Chemotherapy [No matching medication found in this treatment plan]     Treatment Details   Treatment goal Palliative   Plan Name OP RiTUXimab weekly x 4, every 6 months   Status Active   Start Date 8/1/2022   End Date 8/23/2022   Provider Khalif Silva DO   Chemotherapy riTUXimab (RITUXAN) subsequent titrated chemo infusion, 375 mg/m2 = 746 2 mg, Intravenous, Once, 1 of 1 cycle  Administration: 746 2 mg (8/9/2022), 746 2 mg (8/16/2022), 746 2 mg (8/23/2022)    riTUXimab (RITUXAN) first titrated chemo infusion, 375 mg/m2 = 746 2 mg, Intravenous, Once, 1 of 1 cycle  Administration: 746 2 mg (8/1/2022)          HISTORY OF PRESENT ILLNESS: Veronica Navarro is a 75 y o  male  who has stage IIIc melanoma here for follow-up  He had taken encorafenib and binimetinib for 2 weeks after having side effects the first time he tried it, but side effects continued and persisted  He felt horrible and dizzy and nauseous and did not wish to feel like that all the time, so he stopped the medication after being on it for 2 weeks  He also developed episodes of palpitations which were more apparent to him  He states one of them woke him up from sleep  This occurred while on the medication  He describes having a runny nose and congestion  Has been ongoing for some time  Denies any allergies  He also describes having wheezing, its more prominent at night when he is lying on his left side improved when he is on his right side  He will follow-up with his primary care doctor  He did get an EKG which was unchanged from prior and he has had blood work, that does show drop in hemoglobin  He will repeat blood work next week to continue to monitor to see how his hemoglobin is    He does have this underlying history of hemolytic anemia, that is exacerbated at times of physiologic stress  He did have a squamous cell in situ identified on his right hand for which he will undergo Mohs  Denies any new, changing, or concerning skin lesions at this time  Denies lymphadenopathy  REVIEW OF SYSTEMS:  Review of Systems   Constitutional: Negative for appetite change, fatigue, fever and unexpected weight change  HENT:   Negative for lump/mass  Eyes: Negative for icterus  Respiratory: Negative for cough, shortness of breath and wheezing  Cardiovascular: Negative for leg swelling  Gastrointestinal: Negative for abdominal pain, constipation, diarrhea, nausea and vomiting  Genitourinary: Negative for difficulty urinating and hematuria  Musculoskeletal: Negative for arthralgias, gait problem and myalgias  Skin: Negative for itching and rash  Scab from biopsy on his right hand with SCC that needs Mohs  No additional new, changing, or concerning lesions  Neurological: Negative for extremity weakness, gait problem, headaches, light-headedness and numbness  Hematological: Negative for adenopathy          MEDICATIONS:    Current Outpatient Medications:   •  acetaminophen (TYLENOL) 325 mg tablet, Take 2 tablets (650 mg total) by mouth every 6 (six) hours as needed for mild pain, Disp:  , Rfl: 0  •  ALPRAZolam (XANAX) 0 5 mg tablet, Take 1 tablet (0 5 mg total) by mouth 2 (two) times a day Take one two hours before scan and second one 30 min before, Disp: 2 tablet, Rfl: 0  •  benzonatate (TESSALON PERLES) 100 mg capsule, Take 1 capsule (100 mg total) by mouth 3 (three) times a day, Disp: 20 capsule, Rfl: 0  •  dabigatran etexilate (PRADAXA) 150 mg capsu, Take 1 capsule (150 mg total) by mouth every 12 (twelve) hours, Disp: 60 capsule, Rfl: 0  •  furosemide (LASIX) 20 mg tablet, Take 1 tablet (20 mg total) by mouth daily, Disp: 5 tablet, Rfl: 0  •  hydrochlorothiazide (HYDRODIURIL) 25 mg tablet, Take 1 tablet (25 mg total) by mouth daily (Patient taking differently: Take 25 mg by mouth every morning), Disp: 30 tablet, Rfl: 5  •  metoprolol succinate (TOPROL-XL) 25 mg 24 hr tablet, Take 0 5 tablets (12 5 mg total) by mouth daily (Patient taking differently: Take 12 5 mg by mouth every morning), Disp: 30 tablet, Rfl: 5  •  ondansetron (ZOFRAN) 4 mg tablet, Take 1 tablet (4 mg total) by mouth every 8 (eight) hours as needed for nausea or vomiting, Disp: 20 tablet, Rfl: 0  •  pantoprazole (PROTONIX) 40 mg tablet, Take 1 tablet (40 mg total) by mouth daily (Patient taking differently: Take 40 mg by mouth every morning), Disp: 90 tablet, Rfl: 3  •  potassium chloride (K-DUR,KLOR-CON) 20 mEq tablet, Take 1 tablet (20 mEq total) by mouth daily (Patient taking differently: Take 20 mEq by mouth every morning), Disp: 30 tablet, Rfl: 3  •  prazosin (MINIPRESS) 1 mg capsule, Take 1 mg by mouth daily at bedtime , Disp: , Rfl:   •  predniSONE 20 mg tablet, Take 2 tablets (40 mg total) by mouth daily, Disp: 60 tablet, Rfl: 0  •  sodium chloride, PF, 0 9 %, 10 mL by Intracatheter route daily Intracatheter flushing daily   May substitute prefilled syringe with normal saline 10 mL vials, 10 mL syringes, and 18 g blunt needles, Disp: 300 mL, Rfl: 0  •  sulfamethoxazole-trimethoprim (BACTRIM DS) 800-160 mg per tablet, Take 1 tablet by mouth 3 (three) times a week Monday, Wednesday and Friday, Disp: 12 tablet, Rfl: 0  •  VITAMIN D PO, Take 1,000 Units by mouth daily , Disp: , Rfl:   •  ascorbic acid (VITAMIN C) 1000 MG tablet, Take 1 tablet (1,000 mg total) by mouth every 12 (twelve) hours for 6 doses (Patient taking differently: Take 1,000 mg by mouth daily Every other day), Disp: 6 tablet, Rfl: 0     ALLERGIES:  No Known Allergies     ACTIVE PROBLEMS:  Patient Active Problem List   Diagnosis   • 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   • Hyperbilirubinemia   • Symptomatic anemia   • Pulmonary embolism with acute cor pulmonale (HCC)   • Portal vein thrombosis   • Elevated blood pressure reading without diagnosis of hypertension   • Shortness of breath   • Gastroesophageal reflux disease   • Autoimmune hemolytic anemia   • ARABELLA (acute kidney injury) ([facility] )   • Splenomegaly   • Iron overload due to repeated red blood cell transfusions   • Posttraumatic stress disorder   • Essential hypertension   • Glucose intolerance (impaired glucose tolerance)   • Renal insufficiency   • Auto immune neutropenia (HCC)   • Primary osteoarthritis of left knee   • Pain in joint, lower leg   • Pain in left knee   • COVID-19   • Thrombocytosis   • Primary localized osteoarthrosis of the knee, right   • Hyperglycemia   • Chronic bilateral low back pain with bilateral sciatica   • Hypercholesterolemia   • Malignant melanoma of leg, right (HCC)   • Dyspnea   • Acute respiratory failure with hypoxia (HCC)   • Elevated serum creatinine   • Elevated bilirubin   • Leukocytosis   • Lymphocele after surgical procedure          PAST MEDICAL HISTORY:   Past Medical History:   Diagnosis Date   • Anemia 11/2/2016   • Anxiety    • Arthritis    • Autoimmune hemolytic anemia ([facility] )    • Claustrophobia    • COVID 12/2020   • DVT (deep venous thrombosis) (HCC)    • GERD (gastroesophageal reflux disease)    • Hearing loss, right    • Hemolytic anemia (HCC)    • History of transfusion     2018 - no adverse reaction   • Hypertension    • Malignant melanoma of leg, right ([facility] ) 7/1/2022   • Palpitation    • Portal vein thrombosis    • PTSD (post-traumatic stress disorder)    • Pulmonary emboli (HCC)    • Tobacco abuse         PAST SURGICAL HISTORY:  Past Surgical History:   Procedure Laterality Date   • CATARACT EXTRACTION Bilateral    • CHOLECYSTECTOMY  07/18/2017   • COLONOSCOPY     • ELBOW ARTHROPLASTY Left     bursectomy   • IR DRAINAGE TUBE CHECK WITH SCLEROSIS  10/06/2022   • IR DRAINAGE TUBE CHECK WITH SCLEROSIS  10/10/2022   • IR DRAINAGE TUBE CHECK WITH SCLEROSIS  10/20/2022   • IR DRAINAGE TUBE CHECK WITH SCLEROSIS  10/27/2022   • IR DRAINAGE TUBE CHECK WITH SCLEROSIS  11/04/2022   • IR DRAINAGE TUBE CHECK WITH SCLEROSIS  11/15/2022   • IR DRAINAGE TUBE CHECK WITH SCLEROSIS  11/25/2022   • IR DRAINAGE TUBE PLACEMENT  09/19/2022   • JOINT REPLACEMENT Right 02/02/2021    knee   • KNEE SURGERY Right     meniscus tear   • LYMPH NODE BIOPSY Right 07/29/2022    Procedure: BIOPSY LYMPH NODE SENTINEL;  Surgeon: Dorian Nava MD;  [facility]: [facility];  Service: Surgical Oncology   • PA LAP,CHOLECYSTECTOMY/GRAPH N/A 12/23/2017    Procedure: CHOLECYSTECTOMY LAPAROSCOPIC with cholangiogram;  Surgeon: Jeb Vazquez MD;  Location[facility];  Service: General   • PA REMOVAL SPLEEN, TOTAL N/A 05/18/2017    Procedure: LAPAROSCOPIC HAND ASSIST SPLENECTOMY;  Surgeon: Roseann Jara MD;  [facility];  Service: Surgical Oncology   • PA TOTAL KNEE ARTHROPLASTY Right 02/02/2021    Procedure: ARTHROPLASTY KNEE TOTAL;  Surgeon: Doreen Jacob MD;  [facility];  Service: Orthopedics   • PA TOTAL KNEE ARTHROPLASTY Left 11/17/2021    Procedure: TOTAL KNEE REPLACEMENT;  Surgeon: Doreen Jacob MD;  [facility];  Service: Orthopedics   • SHOULDER SURGERY Left     rotator cuff x4, reconstruction   • SKIN BIOPSY  5 May 2022   • SKIN CANCER EXCISION  29 July 2022   • SKIN LESION EXCISION Right 07/29/2022    Procedure: WIDE EXCISION RIGHT MEDIAL THIGH;  Surgeon: Dorian Nava MD;  [facility]: [facility];  Service: Surgical Oncology        SOCIAL HISTORY:  Social History     Socioeconomic History   • Marital status: /Civil Union     Spouse name: None   • Number of children: None   • Years of education: None   • Highest education level: None   Occupational History   • None   Tobacco Use   • Smoking status: Some Days     Packs/day: 0 00     Years: 5 00     Pack years: 0 00     Types: Cigars, Cigarettes   • Smokeless tobacco: Current     Types: Snuff   • Tobacco comments:     5  a week average   Vaping Use   • Vaping Use: Never used   Substance and Sexual Activity   • Alcohol use: Yes     Alcohol/week: 6 0 standard drinks     Types: 6 Cans of beer per week     Comment: socially   • Drug use: Yes     Types: Marijuana     Comment: medical marijuana   • Sexual activity: Yes     Partners: Female     Birth control/protection: None   Other Topics Concern   • None   Social History Narrative    Daily coffee consumption (2 cups/day)    reitred     Social Determinants of Health     Financial Resource Strain: Not on file   Food Insecurity: No Food Insecurity   • Worried About Running Out of Food in the Last Year: Never true   • Ran Out of Food in the Last Year: Never true   Transportation Needs: No Transportation Needs   • Lack of Transportation (Medical): No   • Lack of Transportation (Non-Medical): No   Physical Activity: Not on file   Stress: Not on file   Social Connections: Not on file   Intimate Partner Violence: Not on file   Housing Stability: Low Risk    • Unable to Pay for Housing in the Last Year: No   • Number of Places Lived in the Last Year: 1   • Unstable Housing in the Last Year: No        FAMILY HISTORY:  Family History   Problem Relation Age of Onset   • Cancer Mother    • Breast cancer Mother    • Other Father         CABG   • Heart attack Paternal Grandfather         acute MI           Objective    PHYSICAL EXAMINATION:   Blood pressure 130/80, pulse 87, resp  rate 18, height 5' 8" (1 727 m), weight 91 6 kg (202 lb), SpO2 99 %  Pain Score: 0-No pain     ECOG Performance Status    Flowsheet Row Most Recent Value   ECOG Performance Status 0 - Fully active, able to carry on all pre-disease performance without restriction             Physical Exam  Constitutional:       General: He is not in acute distress  Appearance: Normal appearance  He is not toxic-appearing  HENT:      Mouth/Throat:      Mouth: Mucous membranes are moist       Pharynx: Oropharynx is clear     Eyes:      General: No scleral icterus  Cardiovascular:      Rate and Rhythm: Normal rate and regular rhythm  Pulses: Normal pulses  Heart sounds: No murmur heard  No friction rub  No gallop  Pulmonary:      Effort: Pulmonary effort is normal  No respiratory distress  Breath sounds: Normal breath sounds  No wheezing or rales  Abdominal:      General: There is no distension  Palpations: There is no mass  Tenderness: There is no abdominal tenderness  There is no rebound  Musculoskeletal:         General: No swelling or tenderness  Right lower leg: No edema  Left lower leg: No edema  Lymphadenopathy:      Head:      Right side of head: No submandibular, preauricular or posterior auricular adenopathy  Left side of head: No submandibular, preauricular or posterior auricular adenopathy  Cervical: No cervical adenopathy  Right cervical: No superficial or posterior cervical adenopathy  Left cervical: No superficial or posterior cervical adenopathy  Upper Body:      Right upper body: No supraclavicular or axillary adenopathy  Left upper body: No supraclavicular or axillary adenopathy  Skin:     Findings: No rash  Comments: Biopsy site on right hand  No additional concerning skin lesions  Neurological:      General: No focal deficit present  Mental Status: He is alert and oriented to person, place, and time  Psychiatric:         Mood and Affect: Mood normal          Behavior: Behavior normal          Thought Content: Thought content normal          Judgment: Judgment normal          I reviewed lab data in the chart      WBC   Date Value Ref Range Status   12/12/2022 14 78 (H) 4 31 - 10 16 Thousand/uL Final   11/28/2022 12 42 (H) 4 31 - 10 16 Thousand/uL Final   11/11/2022 12 33 (H) 4 31 - 10 16 Thousand/uL Final     Hemoglobin   Date Value Ref Range Status   12/12/2022 12 6 12 0 - 17 0 g/dL Final   11/28/2022 14 8 12 0 - 17 0 g/dL Final   11/11/2022 14 7 12 0 - 17 0 g/dL Final     Platelets   Date Value Ref Range Status   12/12/2022 504 (H) 149 - 390 Thousands/uL Final   11/28/2022 535 (H) 149 - 390 Thousands/uL Final   11/11/2022 433 (H) 149 - 390 Thousands/uL Final     MCV   Date Value Ref Range Status   12/12/2022 110 (H) 82 - 98 fL Final   11/28/2022 108 (H) 82 - 98 fL Final   11/11/2022 109 (H) 82 - 98 fL Final      Potassium   Date Value Ref Range Status   12/12/2022 3 7 3 5 - 5 3 mmol/L Final   11/28/2022 3 6 3 5 - 5 3 mmol/L Final   10/21/2022 3 0 (L) 3 5 - 5 3 mmol/L Final     Chloride   Date Value Ref Range Status   12/12/2022 106 96 - 108 mmol/L Final   11/28/2022 106 96 - 108 mmol/L Final   10/21/2022 108 96 - 108 mmol/L Final     CO2   Date Value Ref Range Status   12/12/2022 30 21 - 32 mmol/L Final   11/28/2022 28 21 - 32 mmol/L Final   10/21/2022 25 21 - 32 mmol/L Final     CO2, i-STAT   Date Value Ref Range Status   05/18/2017 26 21 - 32 mmol/L Final     BUN   Date Value Ref Range Status   12/12/2022 21 5 - 25 mg/dL Final   11/28/2022 22 5 - 25 mg/dL Final   10/21/2022 23 5 - 25 mg/dL Final     Creatinine   Date Value Ref Range Status   12/12/2022 1 21 0 60 - 1 30 mg/dL Final     Comment:     Standardized to IDMS reference method   11/28/2022 1 44 (H) 0 60 - 1 30 mg/dL Final     Comment:     Standardized to IDMS reference method   10/21/2022 1 16 0 60 - 1 30 mg/dL Final     Comment:     Standardized to IDMS reference method     Glucose   Date Value Ref Range Status   10/05/2022 88 65 - 140 mg/dL Final     Comment:     If the patient is fasting, the ADA then defines impaired fasting glucose as > 100 mg/dL and diabetes as > or equal to 123 mg/dL  Specimen collection should occur prior to Sulfasalazine administration due to the potential for falsely depressed results  Specimen collection should occur prior to Sulfapyridine administration due to the potential for falsely elevated results     09/07/2022 134 65 - 140 mg/dL Final     Comment:     If the patient is fasting, the ADA then defines impaired fasting glucose as > 100 mg/dL and diabetes as > or equal to 123 mg/dL  Specimen collection should occur prior to Sulfasalazine administration due to the potential for falsely depressed results  Specimen collection should occur prior to Sulfapyridine administration due to the potential for falsely elevated results  08/30/2022 126 65 - 140 mg/dL Final     Comment:     If the patient is fasting, the ADA then defines impaired fasting glucose as > 100 mg/dL and diabetes as > or equal to 123 mg/dL  Specimen collection should occur prior to Sulfasalazine administration due to the potential for falsely depressed results  Specimen collection should occur prior to Sulfapyridine administration due to the potential for falsely elevated results  Calcium   Date Value Ref Range Status   12/12/2022 10 0 8 3 - 10 1 mg/dL Final   11/28/2022 9 7 8 3 - 10 1 mg/dL Final   10/21/2022 9 5 8 3 - 10 1 mg/dL Final     Albumin   Date Value Ref Range Status   12/12/2022 4 0 3 5 - 5 0 g/dL Final   11/28/2022 3 3 (L) 3 5 - 5 0 g/dL Final   10/21/2022 3 2 (L) 3 5 - 5 0 g/dL Final     Total Bilirubin   Date Value Ref Range Status   12/12/2022 1 27 (H) 0 20 - 1 00 mg/dL Final     Comment:     Use of this assay is not recommended for patients undergoing treatment with eltrombopag due to the potential for falsely elevated results  11/28/2022 0 56 0 20 - 1 00 mg/dL Final     Comment:     Use of this assay is not recommended for patients undergoing treatment with eltrombopag due to the potential for falsely elevated results  10/21/2022 0 47 0 20 - 1 00 mg/dL Final     Comment:     Use of this assay is not recommended for patients undergoing treatment with eltrombopag due to the potential for falsely elevated results       Alkaline Phosphatase   Date Value Ref Range Status   12/12/2022 69 46 - 116 U/L Final   11/28/2022 62 46 - 116 U/L Final   10/21/2022 54 46 - 116 U/L Final     AST   Date Value Ref Range Status   12/12/2022 36 5 - 45 U/L Final     Comment:     Specimen collection should occur prior to Sulfasalazine administration due to the potential for falsely depressed results  11/28/2022 22 5 - 45 U/L Final     Comment:     Specimen collection should occur prior to Sulfasalazine administration due to the potential for falsely depressed results  10/21/2022 20 5 - 45 U/L Final     Comment:     Specimen collection should occur prior to Sulfasalazine administration due to the potential for falsely depressed results  ALT   Date Value Ref Range Status   12/12/2022 48 12 - 78 U/L Final     Comment:     Specimen collection should occur prior to Sulfasalazine and/or Sulfapyridine administration due to the potential for falsely depressed results  11/28/2022 31 12 - 78 U/L Final     Comment:     Specimen collection should occur prior to Sulfasalazine and/or Sulfapyridine administration due to the potential for falsely depressed results  10/21/2022 34 12 - 78 U/L Final     Comment:     Specimen collection should occur prior to Sulfasalazine and/or Sulfapyridine administration due to the potential for falsely depressed results  LD   Date Value Ref Range Status   12/12/2022 331 (H) 81 - 234 U/L Final   11/28/2022 262 (H) 81 - 234 U/L Final   10/21/2022 236 (H) 81 - 234 U/L Final     No results found for: TSH  No results found for: [id number]   Free T4   Date Value Ref Range Status   11/28/2022 0 66 (L) 0 76 - 1 46 ng/dL Final     Comment:     Specimen collection should occur prior to Sulfasalazine administration due to the potential for falsely elevated results  RECENT IMAGING:  Procedure: IR drainage tube check with sclerosis    Result Date: 11/29/2022  Narrative: Seroma tube check Clinical History: Fentanyl right inguinal lymph node excisional biopsy, complicated by right inguinal lymphocele   Contrast: 1 mL of iohexol (OMNIPAQUE) Fluoro time: 0 3 Minutes Number of Images: Multiple Radiation dose: 11 mGy Technique: The patient was brought to the interventional radiology suite and placed supine on the table  After a  view was obtained, contrast was injected into the seroma drainage catheter and multiple images were obtained  Findings: Resolution of right inguinal seroma cavity  Impression: Impression: Removal of a drainage catheter  Signed, performed, and dictated by VISHNU Pelayo under the supervision of Dr Agustin Rebolledo  Workstation performed: [id number]     Procedure: IR drainage tube check with sclerosis    Result Date: 11/15/2022  Narrative: PROCEDURE: Drainage catheter check with catheter exchange and sclerotherapy Procedural Personnel Attending physician(s): Dr Mariana Poe Pre-procedure diagnosis: Melanoma Post-procedure diagnosis: Same Indication: Patient with history of right knee melanoma status post wide local excision with sentinel right inguinal lymph node excisional biopsy, complicated by right inguinal lymphocele status post drainage catheter placement 2 months ago returns for repeat drain check  PROCEDURE SUMMARY: - Drainage catheter check under fluoroscopic guidance - Drainage catheter exchange - Right inguinal lymphocele sclerotherapy PROCEDURE DETAILS: Pre-procedure Consent: Existing informed consent was utilized and time-out was performed prior to the procedure  Preparation: The site was prepared and draped using maximal sterile barrier technique including cutaneous antisepsis  Anesthesia/sedation Level of anesthesia/sedation: Local lidocaine Drainage catheter check and exchange Contrast injection through the existing catheter showed persistent lymphocele cavity  Catheter appeared partially retracted  Existing catheter was exchanged for a new 10 [employer] Janice Brown-Cash catheter over a Bentson wire  Catheter was secured to skin using 0 point suture  Sclerotherapy performed using 15 mL Betadine   Contrast Contrast agent: Omnipaque Contrast volume (mL): 10 Radiation Dose Fluoroscopy time (minutes): 1 1  Reference air kerma (mGy): 35 Additional Details Estimated blood loss (mL): None Complications: No immediate complications  Impression: 1  Drainage catheter check showed persistent small lymphocele cavity  2  Existing drainage catheter was partially retracted  Catheter was exchanged for a new 10 [employer] Janice Brown-Cash catheter  3  Sclerotherapy performed using 15 mL of Betadine  Plan: - Patient to uncap drainage catheter and connect to bulb suction drainage in 2 hours  - Return in one week for repeat drain check  Workstation performed: [id number]             Assessment/Plan  Mr Alexi Ross is a 75 yr male with stage IIIc melanoma status post short treatment with encorafenib and binimetinib in the adjuvant setting here for follow-up and surveillance  1  Malignant melanoma of leg, right ([address] )  Attempted treatment with adjuvant therapy with encorafenib and binimetinib  However could not tolerate side effects from medications  They made him feel unwell  He stated he did not wish to feel like that  I discussed that in the adjuvant setting I am less likely to push medications and agree with holding them since he was feeling unwell and not tolerating the medication  I discussed it is a different situation when you are trying to treat disease that has spread  We discussed continuation of close follow-up and surveillance monitoring for disease recurrence or metastasis  He will continue to follow along with physical exams and blood work every 3 months until 3 years out from diagnosis, and then every 6 months for years 4 and year 5  Over this time  We discussed we will continue to scan every 6 months  He understands and is in agreement with the plan  He is due for follow-up in February with full body scans at that time  He has standing orders for blood work  He requested Xanax as he gets anxiety with scans  Orders placed      He knows to call with issues or concerns prior to his next visit  He will continue with dermatology follow-up  He is to undergo Mohs to remove squamous cell on his right hand  All orders placed and prescription provided  - [facility] pet ct tumor imaging whole body; Future  - ALPRAZolam (XANAX) 0 5 mg tablet; Take 1 tablet (0 5 mg total) by mouth 2 (two) times a day Take one two hours before scan and second one 30 min before  Dispense: 2 tablet; Refill: 0    2  Wheezing  Not apparent on exam today  He states its more when he is lying down on his left side  He will follow-up with his primary care doctor  No shortness of breath at this time  3  Autoimmune hemolytic anemia  Follows with Dr Jenise Rodriguez and has required rituximab in the past   He has had a hemoglobin drop over the past 2 weeks while on treatment with encorafenib and binimetinib  He will repeat blood work next week and we will assess his hemoglobin at that time  4  Anxiety  Gets anxiety with scans  Ordered Xanax to help with this  He is due for imaging in February Return in about 2 months (around 2/14/2023) for Office Visit, labs, scans       Colt Burnham MD, PhD

## 2022-12-19 ENCOUNTER — TELEPHONE (OUTPATIENT)
Dept: DERMATOLOGY | Facility: CLINIC | Age: 67
End: 2022-12-19

## 2022-12-19 NOTE — TELEPHONE ENCOUNTER
Pre- operative Mohs Telephone Scheduling Note    Do you have a pacemaker or defibrillator? no    Do you take antibiotics before skin or dental procedures? no  If yes, will likely require pre-operative antibiotics  Ask  the patient why they take the antibiotics (usually because of joint replacement)  Do you have a history of a joint replacements within the past 2 years? no   If yes, will likely require pre-operative antibiotics  Ask if orthopaedic surgeon has prescribed pre-operative antibiotics to take before procedures/dental work? Do you take any OTC medications that thin your blood (Aspirin, Aleve, Ibuprofen) or supplements that thin your blood (fish oil, garlic, vitamin E, Ginko Biloba)? no    Do you take any prescribed medications that thin your blood (Coumadin, Plavix, Xarelto, Eliquis or another prescribed blood thinner)? yes: pradaxia    Do you have an allergy to lidocaine or epinephrine? no    Do you have an allergy to shellfish? no    Do you smoke? no      If yes,  patient to try and stop 2 days before surgery and 7 days after the surgery  Minimizing smoking as much as possible during this time will improve healing and the cosmetic result after surgery  Date scheduled: 12/20/22 with vinita       Coordination of Care with other provider (Oculoplastics, Plastics, ENT) required? no   IF YES, PLEASE FORWARD TO APPROPRIATE PERSONNEL TO HELP COORDINATE  Are there remaining tumors to be scheduled? no    Was Prior Authorization obtained?  No (please use  [email or web address] to document prior auth)

## 2022-12-20 ENCOUNTER — PROCEDURE VISIT (OUTPATIENT)
Dept: DERMATOLOGY | Facility: CLINIC | Age: 67
End: 2022-12-20

## 2022-12-20 VITALS
TEMPERATURE: 97 F | SYSTOLIC BLOOD PRESSURE: 138 MMHG | HEIGHT: 68 IN | DIASTOLIC BLOOD PRESSURE: 80 MMHG | BODY MASS INDEX: 30.65 KG/M2 | RESPIRATION RATE: 16 BRPM | OXYGEN SATURATION: 97 % | WEIGHT: 202.2 LBS | HEART RATE: 88 BPM

## 2022-12-20 DIAGNOSIS — C44.622 SQUAMOUS CELL CANCER OF SKIN OF RIGHT HAND: ICD-10-CM

## 2022-12-20 NOTE — PROGRESS NOTES
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      Past Medical History:   Diagnosis Date   • Anemia 2016   • Anxiety    • Arthritis    • Autoimmune hemolytic anemia ([address] )    • Claustrophobia    • COVID 2020   • DVT (deep venous thrombosis) (HCC)    • GERD (gastroesophageal reflux disease)    • Hearing loss, right    • Hemolytic anemia (HCC)    • History of transfusion     2018 - no adverse reaction   • Hypertension    • Malignant melanoma of leg, right ([address] ) 2022   • Palpitation    • Portal vein thrombosis    • PTSD (post-traumatic stress disorder)    • Pulmonary emboli (HCC)    • Squamous cell skin cancer 2022    SCCIS- 22   • Tobacco abuse        Past Surgical History:   Procedure Laterality Date   • CATARACT EXTRACTION Bilateral    • CHOLECYSTECTOMY  2017   • COLONOSCOPY     • ELBOW ARTHROPLASTY Left     bursectomy   • IR DRAINAGE TUBE CHECK WITH SCLEROSIS  10/06/2022   • IR DRAINAGE TUBE CHECK WITH SCLEROSIS  10/10/2022   • IR DRAINAGE TUBE CHECK WITH SCLEROSIS  10/20/2022   • IR DRAINAGE TUBE CHECK WITH SCLEROSIS  10/27/2022   • IR DRAINAGE TUBE CHECK WITH SCLEROSIS  2022   • IR DRAINAGE TUBE CHECK WITH SCLEROSIS  11/15/2022   • IR DRAINAGE TUBE CHECK WITH SCLEROSIS  2022   • IR DRAINAGE TUBE PLACEMENT  2022   • JOINT REPLACEMENT Right 2021    knee   • KNEE SURGERY Right     meniscus tear   • LYMPH NODE BIOPSY Right 2022    Procedure: BIOPSY LYMPH NODE SENTINEL;  Surgeon: Wicho No MD;  [facility]: [facility];  Service: Surgical Oncology   • MOHS SURGERY Right 2022    Right dorsal hand SCCIS-Dr Isabel   • [place] LAP,CHOLECYSTECTOMY/GRAPH N/A 2017    Procedure: CHOLECYSTECTOMY LAPAROSCOPIC with cholangiogram;  Surgeon: Elizabeth Schmidt MD;  [facility];  Service: General   • OR REMOVAL SPLEEN, TOTAL N/A 05/18/2017    Procedure: LAPAROSCOPIC HAND ASSIST SPLENECTOMY;  Surgeon: Yisel Spann MD;  [facility];  Service: Surgical Oncology   • OR TOTAL KNEE ARTHROPLASTY Right 02/02/2021    Procedure: ARTHROPLASTY KNEE TOTAL;  Surgeon: Herson Field MD;  [facility];  Service: Orthopedics   • OR TOTAL KNEE ARTHROPLASTY Left 11/17/2021    Procedure: TOTAL KNEE REPLACEMENT;  Surgeon: Herson Field MD;  [facility];  Service: Orthopedics   • SHOULDER SURGERY Left     rotator cuff x4, reconstruction   • SKIN BIOPSY  5 May 2022   • SKIN CANCER EXCISION  29 July 2022   • SKIN LESION EXCISION Right 07/29/2022    Procedure: WIDE EXCISION RIGHT MEDIAL THIGH;  Surgeon: Katy Meléndez MD;  [facility]: [facility];  Service: Surgical Oncology         Current Outpatient Medications:   •  acetaminophen (TYLENOL) 325 mg tablet, Take 2 tablets (650 mg total) by mouth every 6 (six) hours as needed for mild pain, Disp:  , Rfl: 0  •  dabigatran etexilate (PRADAXA) 150 mg capsu, Take 1 capsule (150 mg total) by mouth every 12 (twelve) hours, Disp: 60 capsule, Rfl: 0  •  hydrochlorothiazide (HYDRODIURIL) 25 mg tablet, Take 1 tablet (25 mg total) by mouth daily (Patient taking differently: Take 25 mg by mouth every morning), Disp: 30 tablet, Rfl: 5  •  metoprolol succinate (TOPROL-XL) 25 mg 24 hr tablet, Take 0 5 tablets (12 5 mg total) by mouth daily (Patient taking differently: Take 12 5 mg by mouth every morning), Disp: 30 tablet, Rfl: 5  •  pantoprazole (PROTONIX) 40 mg tablet, Take 1 tablet (40 mg total) by mouth daily (Patient taking differently: Take 40 mg by mouth every morning), Disp: 90 tablet, Rfl: 3  •  potassium chloride (K-DUR,KLOR-CON) 20 mEq tablet, Take 1 tablet (20 mEq total) by mouth daily (Patient taking differently: Take 20 mEq by mouth every morning), Disp: 30 tablet, Rfl: 3  •  prazosin (MINIPRESS) 1 mg capsule, Take 1 mg by mouth daily at bedtime , Disp: , Rfl:   •  predniSONE 20 mg tablet, Take 2 tablets (40 mg total) by mouth daily, Disp: 60 tablet, Rfl: 0  •  VITAMIN D PO, Take 1,000 Units by mouth daily , Disp: , Rfl:   •  ALPRAZolam (XANAX) 0 5 mg tablet, Take 1 tablet (0 5 mg total) by mouth 2 (two) times a day Take one two hours before scan and second one 30 min before (Patient not taking: Reported on 12/20/2022), Disp: 2 tablet, Rfl: 0  •  ascorbic acid (VITAMIN C) 1000 MG tablet, Take 1 tablet (1,000 mg total) by mouth every 12 (twelve) hours for 6 doses (Patient taking differently: Take 1,000 mg by mouth daily Every other day), Disp: 6 tablet, Rfl: 0  •  benzonatate (TESSALON PERLES) 100 mg capsule, Take 1 capsule (100 mg total) by mouth 3 (three) times a day (Patient not taking: Reported on 12/20/2022), Disp: 20 capsule, Rfl: 0  •  furosemide (LASIX) 20 mg tablet, Take 1 tablet (20 mg total) by mouth daily (Patient not taking: Reported on 12/20/2022), Disp: 5 tablet, Rfl: 0  •  ondansetron (ZOFRAN) 4 mg tablet, Take 1 tablet (4 mg total) by mouth every 8 (eight) hours as needed for nausea or vomiting (Patient not taking: Reported on 12/20/2022), Disp: 20 tablet, Rfl: 0  •  sodium chloride, PF, 0 9 %, 10 mL by Intracatheter route daily Intracatheter flushing daily   May substitute prefilled syringe with normal saline 10 mL vials, 10 mL syringes, and 18 g blunt needles, Disp: 300 mL, Rfl: 0  •  sulfamethoxazole-trimethoprim (BACTRIM DS) 800-160 mg per tablet, Take 1 tablet by mouth 3 (three) times a week Monday, Wednesday and Friday (Patient not taking: Reported on 12/20/2022), Disp: 12 tablet, Rfl: 0    No Known Allergies    Physical Exam:   Vitals:    12/20/22 1156   BP: 138/80   Pulse: 88   Resp: 16   Temp: (!) 97 °F (36 1 °C)   SpO2: 97%     General: Awake, Alert, Oriented x 3, Mood and affect appropriate  Respiratory: Respirations even and unlabored  Cardiovascular: Peripheral pulses intact; no edema  Musculoskeletal Exam: N/A    Skin: 1 1 cm x 1 1 cm keratotic thin papule on the right dorsal hand  Assessment: Biopsy proven to be squamous cell carcinoma in situ  Plan: Mohs    MOHS Procedure Timeout    Flowsheet Row Most Recent Value   Timeout: 2514   Patient Identity Verified: Yes   Correct Site Verified: Yes   Correct Procedure Verified: Yes          MOHS Diagnosis/Indication/Location/ID    Flowsheet Row Most Recent Value   Pathology Type Squamous cell carcinoma   Anatomic Site right dorsum hand   Indications for MOHS tumor location   MOHS ID [id number]          MOHS Site/Accession/Pre-Post    Flowsheet Row Most Recent Value   Original Site Identified (as submitted by referring clinician) Photo   Biopsy Accession/Specimen # (as submitted by referring clincian) [id number]   Pre-MOHS Size Length (cm) 1 1   Pre-MOHS Size Width (cm) 1 1   Post-MOHS Size-Length (cm) 1 4   Post MOHS Size-Width (cm) 1 2   Repair Type Second intention   Anesthetic Used 1% Lidocaine with epinephrine          MOHS Tumor Stage 1 Information    Flowsheet Row Most Recent Value   Tissue Sections (blocks) 2   Microscopic Exam Section 1: No tumor identified in section  Microscopic Exam Section 2: No tumor identified in section  Tumor Clear After Stage I? Yes                    Patient identified, procedure verified, site identified and verified  Time out completed  Surgical removal of the lesion discussed with the patient (risks and benefits, including possibility of scarring, infection, recurrence or potential for further treatment)  I have specifically identified the site with the patient  I have discussed the fact that the patient will have a scar after the procedure regardless of granulation or repair with sutures  I have discussed that the repair options can range from granulation in some cases to linear or curvilinear closures to larger flaps or grafts    There are sometimes flaps or grafts used that require multiples stages of surgery and will not be completed today, rather be completed over a series of appointments  I have discussed that occasionally due to location, size or depth of the lesion I may recommend consultation with and transfer of care for further removal or the reconstruction to another provider such as ophthalmology surgery, plastic surgery, ENT surgery, or surgical oncology  There are cases in which other testing such as imaging with MRI or CT scan or testing of lymph nodes is recommended because of the nature/depth/location of tumor seen during the removal  There is a risk of injury to nerves causing temporary or permanent numbness or the inability to move muscles full such as the inability to lift eyebrows  Questions answered and verbal and written consent was obtained  The tumor qualifies for Mohs based on AUC criteria  Dr Michelle Barr served as the surgeon and pathologist during the procedure  After discussion with the patient regarding the various options, the best wound management decision of secondary intent was agreed on for optimal results  The patient tolerated the procedure well  The wound was dressed with Puracol, petrolatum, a non-stick pad, and a compression dressing  Wound care was discussed with the patient, and a wound care instruction sheet was given  Postoperative care: Wound care discussed at length  I urged the patient to call us if any problems or question should arise  Complications: none  Post-op medications: none  Patient condition after procedure: stable  Discharge plans: Plan for wound check 2 months or sooner if needed         Scribe Attestation    I,:  Noemy Vuong RN am acting as a scribe while in the presence of the attending physician :       I,:  Susi Lynne MD personally performed the services described in this documentation    as scribed in my presence :

## 2022-12-20 NOTE — PATIENT INSTRUCTIONS
Mohs Microscopic Surgery After Care    Your wound will heal via secondary intention, which means no additional surgery was performed after removal of your skin cancer  The wound was left open to heal gradually over time using wound care alone  Wounds left to heal secondarily can take 2-3 months on average to heal   The healing occurs step by step  You will notice that over the first three weeks the area will drain straw colored fluid that may have some blood within it  This is normal  Over the first three weeks, you form a nice healing base called fibrin that serves as the scaffold or map for the rest of your body's healing process  The fibrin is a thick yellow tissue  People often worry that it is pus given the yellow color  Unlike pus, this is a thick layer that cannot be rubbed off  After this, you should expect the wound to "fill in" to the surface of your skin over the course of several weeks  Lastly, a new layer of skin will form over the wound  Until your body forms a good fibrin base (which takes ~3 weeks) you should avoid immersing the wound in water (such as in baths, whirlpools, swimming pools, hot tubs, lakes and oceans)  You however CAN and should wash the area daily, as instructed below  After healing, the scar will initially be red (and often times a deep red to purple color on the legs) but will gradually fade over the course of 1 year to to round scar lighter than the skin surrounding it  We advise you follow the wound care as noted below the entire time it takes for the areas to heal completely  WOUND CARE AFTER YOUR PROCEDURE    Try NOT to remove the pressure bandage for 48 hours  Keep the area clean and dry while this bandage is on  After removing the bandage for the first time, gently clean the area with soap and water  If the bandage is difficult to remove, getting the bandage wet in the shower will sometimes help soften the adhesive and allow it to be removed more easily  You will now need to cleanse this area daily in the shower with gentle soap  There is no need to scrub the area  You will need to apply plain Vaseline ointment (this is over the counter and not a prescription) to the site until it has healed over completely followed by a clean appropriately sized bandage to area  Non stick dressing and paper tape (or Hypafix) are recommended for sensitive skin but a bandaid is fine if it covers the area well  MANAGING YOUR PAIN AFTER SURGERY     You can expect to have some pain after surgery  This is normal  The pain is typically worse the first two days after surgery, and quickly begins to get better  The best strategy for controlling your pain after surgery is around the clock pain control  You can take over the counter Acetaminophen (Tylenol) for discomfort, if no contraindications  If you are taking this at the maximum dose, you can alternate this with Motrin (ibuprofen or Advil) as well  Alternating these medications with each other allows you to maximize your pain control  In addition to Tylenol and Motrin, you can use heating pads or ice packs on your incisions to help reduce your pain  How will I alternate your regular strength over-the-counter pain medication? You will take a dose of pain medication every three hours  Start by taking 650 mg of Tylenol (2 pills of 325 mg)   3 hours later take 600 mg of Motrin (3 pills of 200 mg)   3 hours after taking the Motrin take 650 mg of Tylenol   3 hours after that take 600 mg of Motrin  See example - if your first dose of Tylenol is at 12:00 PM     12:00 PM  Tylenol 650 mg (2 pills of 325 mg)    3:00 PM  Motrin 600 mg (3 pills of 200 mg)    6:00 PM  Tylenol 650 mg (2 pills of 325 mg)    9:00 PM  Motrin 600 mg (3 pills of 200 mg)    Continue alternating every 3 hours      Important:   Do not take more than 4000mg of Tylenol or 3200mg of Motrin in a 24-hour period  What if I still have pain?    If you have pain that is not controlled with the over-the-counter pain medications (Tylenol and Motrin or Advil), don't hesitate to call our staff using the number provided  We will help make sure you are managing your pain in the best way possible, and if necessary, we can provide a prescription for additional pain medication  CALL OUR OFFICE IMMEDIATELY FOR ANY SIGNS OF INFECTION:    This includes fever, chills, increased redness, warmth, tenderness, severe discomfort/pain, or pus or foul smell coming from the wound  If you are experiencing any of the above, please call [facility] directly at [phone] (SKIN)    IF BLEEDING IS NOTICED:    Place a clean cloth over the area and apply firm pressure for thirty minutes  Check the wound ONLY after 30 minutes of direct pressure; do not cheat and sneak a peak, as that does not count  If bleeding persists after 30 minutes of legitimate direct pressure, then try one more round of direct pressure to the area  Should the bleeding become heavier or not stop after the second attempt, call [facility] directly at [phone] (SKIN)  Your call will get routed to the dermatology surgeon on call even after hours

## 2022-12-21 ENCOUNTER — APPOINTMENT (OUTPATIENT)
Dept: LAB | Facility: MEDICAL CENTER | Age: 67
End: 2022-12-21

## 2022-12-28 ENCOUNTER — APPOINTMENT (OUTPATIENT)
Dept: LAB | Facility: MEDICAL CENTER | Age: 67
End: 2022-12-28

## 2023-01-01 ENCOUNTER — HOME CARE VISIT (OUTPATIENT)
Dept: HOME HOSPICE | Facility: HOSPICE | Age: 68
End: 2023-01-01

## 2023-01-01 ENCOUNTER — HOSPITAL ENCOUNTER (INPATIENT)
Facility: HOSPITAL | Age: 68
LOS: 17 days | Discharge: HOSPICE | End: 2023-05-17
Attending: EMERGENCY MEDICINE | Admitting: INTERNAL MEDICINE

## 2023-01-01 ENCOUNTER — HOME CARE VISIT (OUTPATIENT)
Dept: HOME HEALTH SERVICES | Facility: HOME HEALTHCARE | Age: 68
End: 2023-01-01

## 2023-01-01 ENCOUNTER — APPOINTMENT (INPATIENT)
Dept: MRI IMAGING | Facility: HOSPITAL | Age: 68
End: 2023-01-01

## 2023-01-01 ENCOUNTER — HOSPICE ADMISSION (OUTPATIENT)
Dept: HOME HOSPICE | Facility: HOSPICE | Age: 68
End: 2023-01-01

## 2023-01-01 ENCOUNTER — TELEPHONE (OUTPATIENT)
Dept: RADIATION ONCOLOGY | Facility: CLINIC | Age: 68
End: 2023-01-01

## 2023-01-01 ENCOUNTER — APPOINTMENT (INPATIENT)
Dept: ULTRASOUND IMAGING | Facility: HOSPITAL | Age: 68
End: 2023-01-01

## 2023-01-01 ENCOUNTER — APPOINTMENT (INPATIENT)
Dept: CT IMAGING | Facility: HOSPITAL | Age: 68
End: 2023-01-01

## 2023-01-01 ENCOUNTER — TELEPHONE (OUTPATIENT)
Dept: HEMATOLOGY ONCOLOGY | Facility: CLINIC | Age: 68
End: 2023-01-01

## 2023-01-01 ENCOUNTER — APPOINTMENT (INPATIENT)
Dept: GASTROENTEROLOGY | Facility: HOSPITAL | Age: 68
End: 2023-01-01
Attending: INTERNAL MEDICINE

## 2023-01-01 ENCOUNTER — APPOINTMENT (INPATIENT)
Dept: RADIOLOGY | Facility: HOSPITAL | Age: 68
End: 2023-01-01

## 2023-01-01 ENCOUNTER — APPOINTMENT (EMERGENCY)
Dept: CT IMAGING | Facility: HOSPITAL | Age: 68
End: 2023-01-01

## 2023-01-01 ENCOUNTER — APPOINTMENT (EMERGENCY)
Dept: RADIOLOGY | Facility: HOSPITAL | Age: 68
End: 2023-01-01

## 2023-01-01 VITALS
HEART RATE: 77 BPM | WEIGHT: 193.12 LBS | RESPIRATION RATE: 20 BRPM | TEMPERATURE: 97.3 F | BODY MASS INDEX: 29.27 KG/M2 | OXYGEN SATURATION: 88 % | SYSTOLIC BLOOD PRESSURE: 116 MMHG | DIASTOLIC BLOOD PRESSURE: 69 MMHG | HEIGHT: 68 IN

## 2023-01-01 VITALS
BODY MASS INDEX: 29.35 KG/M2 | SYSTOLIC BLOOD PRESSURE: 132 MMHG | WEIGHT: 193 LBS | RESPIRATION RATE: 22 BRPM | TEMPERATURE: 97.1 F | DIASTOLIC BLOOD PRESSURE: 76 MMHG | HEART RATE: 83 BPM

## 2023-01-01 VITALS — TEMPERATURE: 102.3 F | RESPIRATION RATE: 16 BRPM | HEART RATE: 92 BPM

## 2023-01-01 VITALS — TEMPERATURE: 102 F | RESPIRATION RATE: 16 BRPM | HEART RATE: 104 BPM

## 2023-01-01 DIAGNOSIS — N17.9 AKI (ACUTE KIDNEY INJURY) (HCC): ICD-10-CM

## 2023-01-01 DIAGNOSIS — C43.9 METASTATIC MELANOMA (HCC): ICD-10-CM

## 2023-01-01 DIAGNOSIS — C43.9 MELANOMA (HCC): ICD-10-CM

## 2023-01-01 DIAGNOSIS — R53.1 GENERALIZED WEAKNESS: Primary | ICD-10-CM

## 2023-01-01 DIAGNOSIS — E87.1 HYPONATREMIA: ICD-10-CM

## 2023-01-01 DIAGNOSIS — G89.29 CHRONIC BILATERAL LOW BACK PAIN WITH BILATERAL SCIATICA: ICD-10-CM

## 2023-01-01 DIAGNOSIS — A04.72 CLOSTRIDIUM DIFFICILE DIARRHEA: ICD-10-CM

## 2023-01-01 DIAGNOSIS — R91.8 GROUND GLASS OPACITY PRESENT ON IMAGING OF LUNG: ICD-10-CM

## 2023-01-01 DIAGNOSIS — R09.02 HYPOXIA: ICD-10-CM

## 2023-01-01 DIAGNOSIS — B59 PNEUMONIA DUE TO PNEUMOCYSTIS JIROVECII, UNSPECIFIED LATERALITY, UNSPECIFIED PART OF LUNG (HCC): ICD-10-CM

## 2023-01-01 DIAGNOSIS — Z86.19 HISTORY OF CLOSTRIDIUM DIFFICILE INFECTION: ICD-10-CM

## 2023-01-01 DIAGNOSIS — M54.42 CHRONIC BILATERAL LOW BACK PAIN WITH BILATERAL SCIATICA: ICD-10-CM

## 2023-01-01 DIAGNOSIS — A41.9 SEPSIS (HCC): ICD-10-CM

## 2023-01-01 DIAGNOSIS — C79.31 BRAIN METASTASES: Primary | ICD-10-CM

## 2023-01-01 DIAGNOSIS — M54.41 CHRONIC BILATERAL LOW BACK PAIN WITH BILATERAL SCIATICA: ICD-10-CM

## 2023-01-01 DIAGNOSIS — C43.9 METASTATIC MELANOMA (HCC): Primary | ICD-10-CM

## 2023-01-01 DIAGNOSIS — K66.8 FREE INTRAPERITONEAL AIR: ICD-10-CM

## 2023-01-01 DIAGNOSIS — Z79.899 HIGH RISK MEDICATION USE: ICD-10-CM

## 2023-01-01 LAB
2HR DELTA HS TROPONIN: -1 NG/L
4HR DELTA HS TROPONIN: 1 NG/L
ACTH PLAS-MCNC: 6.6 PG/ML (ref 7.2–63.3)
ALBUMIN SERPL BCP-MCNC: 2.8 G/DL (ref 3.5–5)
ALBUMIN SERPL BCP-MCNC: 3.1 G/DL (ref 3.5–5)
ALBUMIN SERPL BCP-MCNC: 3.4 G/DL (ref 3.5–5)
ALBUMIN SERPL BCP-MCNC: 3.6 G/DL (ref 3.5–5)
ALP SERPL-CCNC: 51 U/L (ref 34–104)
ALP SERPL-CCNC: 52 U/L (ref 34–104)
ALP SERPL-CCNC: 53 U/L (ref 34–104)
ALP SERPL-CCNC: 67 U/L (ref 34–104)
ALP SERPL-CCNC: 79 U/L (ref 34–104)
ALP SERPL-CCNC: 94 U/L (ref 34–104)
ALT SERPL W P-5'-P-CCNC: 17 U/L (ref 7–52)
ALT SERPL W P-5'-P-CCNC: 20 U/L (ref 7–52)
ALT SERPL W P-5'-P-CCNC: 23 U/L (ref 7–52)
ALT SERPL W P-5'-P-CCNC: 31 U/L (ref 7–52)
ALT SERPL W P-5'-P-CCNC: 35 U/L (ref 7–52)
ALT SERPL W P-5'-P-CCNC: 39 U/L (ref 7–52)
ANION GAP SERPL CALCULATED.3IONS-SCNC: 10 MMOL/L (ref 4–13)
ANION GAP SERPL CALCULATED.3IONS-SCNC: 11 MMOL/L (ref 4–13)
ANION GAP SERPL CALCULATED.3IONS-SCNC: 14 MMOL/L (ref 4–13)
ANION GAP SERPL CALCULATED.3IONS-SCNC: 7 MMOL/L (ref 4–13)
ANION GAP SERPL CALCULATED.3IONS-SCNC: 7 MMOL/L (ref 4–13)
ANION GAP SERPL CALCULATED.3IONS-SCNC: 8 MMOL/L (ref 4–13)
ANION GAP SERPL CALCULATED.3IONS-SCNC: 9 MMOL/L (ref 4–13)
APTT PPP: 41 SECONDS (ref 23–37)
AST SERPL W P-5'-P-CCNC: 20 U/L (ref 13–39)
AST SERPL W P-5'-P-CCNC: 25 U/L (ref 13–39)
AST SERPL W P-5'-P-CCNC: 26 U/L (ref 13–39)
AST SERPL W P-5'-P-CCNC: 27 U/L (ref 13–39)
AST SERPL W P-5'-P-CCNC: 32 U/L (ref 13–39)
AST SERPL W P-5'-P-CCNC: 38 U/L (ref 13–39)
ATRIAL RATE: 108 BPM
ATRIAL RATE: 155 BPM
ATRIAL RATE: 81 BPM
ATRIAL RATE: 89 BPM
B PARAP IS1001 DNA NPH QL NAA+NON-PROBE: NOT DETECTED
B PERT.PT PRMT NPH QL NAA+NON-PROBE: NOT DETECTED
BACTERIA BLD CULT: NORMAL
BACTERIA BLD CULT: NORMAL
BACTERIA BRONCH AEROBE CULT: NO GROWTH
BACTERIA UR QL AUTO: ABNORMAL /HPF
BASOPHILS # BLD AUTO: 0.03 THOUSANDS/ÂΜL (ref 0–0.1)
BASOPHILS # BLD AUTO: 0.04 THOUSANDS/ÂΜL (ref 0–0.1)
BASOPHILS # BLD AUTO: 0.04 THOUSANDS/ÂΜL (ref 0–0.1)
BASOPHILS # BLD AUTO: 0.05 THOUSANDS/ÂΜL (ref 0–0.1)
BASOPHILS # BLD AUTO: 0.07 THOUSANDS/ÂΜL (ref 0–0.1)
BASOPHILS # BLD AUTO: 0.1 THOUSANDS/ÂΜL (ref 0–0.1)
BASOPHILS # BLD MANUAL: 0 THOUSAND/UL (ref 0–0.1)
BASOPHILS NFR BLD AUTO: 0 % (ref 0–1)
BASOPHILS NFR MAR MANUAL: 0 % (ref 0–1)
BILIRUB SERPL-MCNC: 0.46 MG/DL (ref 0.2–1)
BILIRUB SERPL-MCNC: 0.6 MG/DL (ref 0.2–1)
BILIRUB SERPL-MCNC: 0.67 MG/DL (ref 0.2–1)
BILIRUB SERPL-MCNC: 0.72 MG/DL (ref 0.2–1)
BILIRUB SERPL-MCNC: 0.84 MG/DL (ref 0.2–1)
BILIRUB SERPL-MCNC: 0.9 MG/DL (ref 0.2–1)
BILIRUB UR QL STRIP: NEGATIVE
BNP SERPL-MCNC: 26 PG/ML (ref 0–100)
BUN SERPL-MCNC: 16 MG/DL (ref 5–25)
BUN SERPL-MCNC: 16 MG/DL (ref 5–25)
BUN SERPL-MCNC: 19 MG/DL (ref 5–25)
BUN SERPL-MCNC: 20 MG/DL (ref 5–25)
BUN SERPL-MCNC: 23 MG/DL (ref 5–25)
BUN SERPL-MCNC: 24 MG/DL (ref 5–25)
BUN SERPL-MCNC: 26 MG/DL (ref 5–25)
BUN SERPL-MCNC: 26 MG/DL (ref 5–25)
BUN SERPL-MCNC: 31 MG/DL (ref 5–25)
BUN SERPL-MCNC: 32 MG/DL (ref 5–25)
BUN SERPL-MCNC: 35 MG/DL (ref 5–25)
BUN SERPL-MCNC: 36 MG/DL (ref 5–25)
BUN SERPL-MCNC: 36 MG/DL (ref 5–25)
BUN SERPL-MCNC: 44 MG/DL (ref 5–25)
BUN SERPL-MCNC: 47 MG/DL (ref 5–25)
BUN SERPL-MCNC: 51 MG/DL (ref 5–25)
C DIFF TOX A+B STL QL IA: NEGATIVE
C DIFF TOX GENS STL QL NAA+PROBE: POSITIVE
C PNEUM DNA NPH QL NAA+NON-PROBE: NOT DETECTED
CA-I BLD-SCNC: 1.04 MMOL/L (ref 1.12–1.32)
CALCIUM ALBUM COR SERPL-MCNC: 9.2 MG/DL (ref 8.3–10.1)
CALCIUM ALBUM COR SERPL-MCNC: 9.3 MG/DL (ref 8.3–10.1)
CALCIUM ALBUM COR SERPL-MCNC: 9.3 MG/DL (ref 8.3–10.1)
CALCIUM ALBUM COR SERPL-MCNC: 9.5 MG/DL (ref 8.3–10.1)
CALCIUM ALBUM COR SERPL-MCNC: 9.5 MG/DL (ref 8.3–10.1)
CALCIUM SERPL-MCNC: 8.2 MG/DL (ref 8.4–10.2)
CALCIUM SERPL-MCNC: 8.5 MG/DL (ref 8.4–10.2)
CALCIUM SERPL-MCNC: 8.6 MG/DL (ref 8.4–10.2)
CALCIUM SERPL-MCNC: 8.6 MG/DL (ref 8.4–10.2)
CALCIUM SERPL-MCNC: 8.7 MG/DL (ref 8.4–10.2)
CALCIUM SERPL-MCNC: 8.7 MG/DL (ref 8.4–10.2)
CALCIUM SERPL-MCNC: 8.8 MG/DL (ref 8.4–10.2)
CALCIUM SERPL-MCNC: 9.1 MG/DL (ref 8.4–10.2)
CALCIUM SERPL-MCNC: 9.2 MG/DL (ref 8.4–10.2)
CALCIUM SERPL-MCNC: 9.7 MG/DL (ref 8.4–10.2)
CARDIAC TROPONIN I PNL SERPL HS: 6 NG/L
CARDIAC TROPONIN I PNL SERPL HS: 7 NG/L
CARDIAC TROPONIN I PNL SERPL HS: 8 NG/L
CHLORIDE SERPL-SCNC: 100 MMOL/L (ref 96–108)
CHLORIDE SERPL-SCNC: 101 MMOL/L (ref 96–108)
CHLORIDE SERPL-SCNC: 101 MMOL/L (ref 96–108)
CHLORIDE SERPL-SCNC: 93 MMOL/L (ref 96–108)
CHLORIDE SERPL-SCNC: 93 MMOL/L (ref 96–108)
CHLORIDE SERPL-SCNC: 94 MMOL/L (ref 96–108)
CHLORIDE SERPL-SCNC: 95 MMOL/L (ref 96–108)
CHLORIDE SERPL-SCNC: 96 MMOL/L (ref 96–108)
CHLORIDE SERPL-SCNC: 97 MMOL/L (ref 96–108)
CHLORIDE SERPL-SCNC: 98 MMOL/L (ref 96–108)
CHLORIDE SERPL-SCNC: 99 MMOL/L (ref 96–108)
CLARITY UR: ABNORMAL
CO2 SERPL-SCNC: 16 MMOL/L (ref 21–32)
CO2 SERPL-SCNC: 19 MMOL/L (ref 21–32)
CO2 SERPL-SCNC: 20 MMOL/L (ref 21–32)
CO2 SERPL-SCNC: 21 MMOL/L (ref 21–32)
CO2 SERPL-SCNC: 22 MMOL/L (ref 21–32)
CO2 SERPL-SCNC: 24 MMOL/L (ref 21–32)
CO2 SERPL-SCNC: 25 MMOL/L (ref 21–32)
CO2 SERPL-SCNC: 26 MMOL/L (ref 21–32)
CO2 SERPL-SCNC: 26 MMOL/L (ref 21–32)
CO2 SERPL-SCNC: 27 MMOL/L (ref 21–32)
COLOR UR: YELLOW
CORTIS SERPL-MCNC: 10.5 UG/DL
CORTIS SERPL-MCNC: 17.7 UG/DL
CORTIS SERPL-MCNC: 4.8 UG/DL
CREAT SERPL-MCNC: 1.08 MG/DL (ref 0.6–1.3)
CREAT SERPL-MCNC: 1.13 MG/DL (ref 0.6–1.3)
CREAT SERPL-MCNC: 1.19 MG/DL (ref 0.6–1.3)
CREAT SERPL-MCNC: 1.25 MG/DL (ref 0.6–1.3)
CREAT SERPL-MCNC: 1.27 MG/DL (ref 0.6–1.3)
CREAT SERPL-MCNC: 1.3 MG/DL (ref 0.6–1.3)
CREAT SERPL-MCNC: 1.38 MG/DL (ref 0.6–1.3)
CREAT SERPL-MCNC: 1.49 MG/DL (ref 0.6–1.3)
CREAT SERPL-MCNC: 1.53 MG/DL (ref 0.6–1.3)
CREAT SERPL-MCNC: 1.55 MG/DL (ref 0.6–1.3)
CREAT SERPL-MCNC: 1.62 MG/DL (ref 0.6–1.3)
CREAT SERPL-MCNC: 1.62 MG/DL (ref 0.6–1.3)
CREAT SERPL-MCNC: 1.71 MG/DL (ref 0.6–1.3)
CREAT SERPL-MCNC: 1.72 MG/DL (ref 0.6–1.3)
CREAT SERPL-MCNC: 1.72 MG/DL (ref 0.6–1.3)
CREAT SERPL-MCNC: 1.87 MG/DL (ref 0.6–1.3)
CRP SERPL QL: 121.6 MG/L
CRP SERPL QL: 276.9 MG/L
EOSINOPHIL # BLD AUTO: 0.01 THOUSAND/ÂΜL (ref 0–0.61)
EOSINOPHIL # BLD AUTO: 0.01 THOUSAND/ÂΜL (ref 0–0.61)
EOSINOPHIL # BLD AUTO: 0.02 THOUSAND/ÂΜL (ref 0–0.61)
EOSINOPHIL # BLD AUTO: 0.02 THOUSAND/ÂΜL (ref 0–0.61)
EOSINOPHIL # BLD AUTO: 0.12 THOUSAND/ÂΜL (ref 0–0.61)
EOSINOPHIL # BLD AUTO: 0.38 THOUSAND/ÂΜL (ref 0–0.61)
EOSINOPHIL # BLD MANUAL: 0 THOUSAND/UL (ref 0–0.4)
EOSINOPHIL # BLD MANUAL: 0 THOUSAND/UL (ref 0–0.4)
EOSINOPHIL # BLD MANUAL: 0.54 THOUSAND/UL (ref 0–0.4)
EOSINOPHIL NFR BLD AUTO: 0 % (ref 0–6)
EOSINOPHIL NFR BLD AUTO: 1 % (ref 0–6)
EOSINOPHIL NFR BLD AUTO: 2 % (ref 0–6)
EOSINOPHIL NFR BLD MANUAL: 0 % (ref 0–6)
EOSINOPHIL NFR BLD MANUAL: 0 % (ref 0–6)
EOSINOPHIL NFR BLD MANUAL: 3 % (ref 0–6)
ERYTHROCYTE [DISTWIDTH] IN BLOOD BY AUTOMATED COUNT: 13.3 % (ref 11.6–15.1)
ERYTHROCYTE [DISTWIDTH] IN BLOOD BY AUTOMATED COUNT: 13.6 % (ref 11.6–15.1)
ERYTHROCYTE [DISTWIDTH] IN BLOOD BY AUTOMATED COUNT: 13.6 % (ref 11.6–15.1)
ERYTHROCYTE [DISTWIDTH] IN BLOOD BY AUTOMATED COUNT: 13.7 % (ref 11.6–15.1)
ERYTHROCYTE [DISTWIDTH] IN BLOOD BY AUTOMATED COUNT: 13.8 % (ref 11.6–15.1)
ERYTHROCYTE [DISTWIDTH] IN BLOOD BY AUTOMATED COUNT: 13.8 % (ref 11.6–15.1)
ERYTHROCYTE [DISTWIDTH] IN BLOOD BY AUTOMATED COUNT: 13.9 % (ref 11.6–15.1)
ERYTHROCYTE [DISTWIDTH] IN BLOOD BY AUTOMATED COUNT: 13.9 % (ref 11.6–15.1)
ERYTHROCYTE [DISTWIDTH] IN BLOOD BY AUTOMATED COUNT: 14.3 % (ref 11.6–15.1)
ERYTHROCYTE [DISTWIDTH] IN BLOOD BY AUTOMATED COUNT: 14.7 % (ref 11.6–15.1)
ERYTHROCYTE [DISTWIDTH] IN BLOOD BY AUTOMATED COUNT: 15 % (ref 11.6–15.1)
ERYTHROCYTE [DISTWIDTH] IN BLOOD BY AUTOMATED COUNT: 15.2 % (ref 11.6–15.1)
FERRITIN SERPL-MCNC: 2579 NG/ML (ref 8–388)
FLUAV RNA NPH QL NAA+NON-PROBE: NOT DETECTED
FLUAV RNA RESP QL NAA+PROBE: NEGATIVE
FLUBV RNA NPH QL NAA+NON-PROBE: NOT DETECTED
FLUBV RNA RESP QL NAA+PROBE: NEGATIVE
FOLATE SERPL-MCNC: 2.7 NG/ML (ref 3.1–17.5)
FUNGUS SPEC CULT: NORMAL
GFR SERPL CREATININE-BSD FRML MDRD: 35 ML/MIN/1.73SQ M
GFR SERPL CREATININE-BSD FRML MDRD: 39 ML/MIN/1.73SQ M
GFR SERPL CREATININE-BSD FRML MDRD: 42 ML/MIN/1.73SQ M
GFR SERPL CREATININE-BSD FRML MDRD: 42 ML/MIN/1.73SQ M
GFR SERPL CREATININE-BSD FRML MDRD: 45 ML/MIN/1.73SQ M
GFR SERPL CREATININE-BSD FRML MDRD: 45 ML/MIN/1.73SQ M
GFR SERPL CREATININE-BSD FRML MDRD: 47 ML/MIN/1.73SQ M
GFR SERPL CREATININE-BSD FRML MDRD: 51 ML/MIN/1.73SQ M
GFR SERPL CREATININE-BSD FRML MDRD: 55 ML/MIN/1.73SQ M
GFR SERPL CREATININE-BSD FRML MDRD: 57 ML/MIN/1.73SQ M
GFR SERPL CREATININE-BSD FRML MDRD: 58 ML/MIN/1.73SQ M
GFR SERPL CREATININE-BSD FRML MDRD: 61 ML/MIN/1.73SQ M
GFR SERPL CREATININE-BSD FRML MDRD: 65 ML/MIN/1.73SQ M
GFR SERPL CREATININE-BSD FRML MDRD: 69 ML/MIN/1.73SQ M
GLUCOSE SERPL-MCNC: 105 MG/DL (ref 65–140)
GLUCOSE SERPL-MCNC: 106 MG/DL (ref 65–140)
GLUCOSE SERPL-MCNC: 112 MG/DL (ref 65–140)
GLUCOSE SERPL-MCNC: 112 MG/DL (ref 65–140)
GLUCOSE SERPL-MCNC: 119 MG/DL (ref 65–140)
GLUCOSE SERPL-MCNC: 132 MG/DL (ref 65–140)
GLUCOSE SERPL-MCNC: 133 MG/DL (ref 65–140)
GLUCOSE SERPL-MCNC: 149 MG/DL (ref 65–140)
GLUCOSE SERPL-MCNC: 233 MG/DL (ref 65–140)
GLUCOSE SERPL-MCNC: 82 MG/DL (ref 65–140)
GLUCOSE SERPL-MCNC: 84 MG/DL (ref 65–140)
GLUCOSE SERPL-MCNC: 87 MG/DL (ref 65–140)
GLUCOSE SERPL-MCNC: 89 MG/DL (ref 65–140)
GLUCOSE SERPL-MCNC: 91 MG/DL (ref 65–140)
GLUCOSE SERPL-MCNC: 96 MG/DL (ref 65–140)
GLUCOSE SERPL-MCNC: 96 MG/DL (ref 65–140)
GLUCOSE SERPL-MCNC: 99 MG/DL (ref 65–140)
GLUCOSE UR STRIP-MCNC: NEGATIVE MG/DL
GRAM STN SPEC: NORMAL
HADV DNA NPH QL NAA+NON-PROBE: NOT DETECTED
HCOV 229E RNA NPH QL NAA+NON-PROBE: NOT DETECTED
HCOV HKU1 RNA NPH QL NAA+NON-PROBE: NOT DETECTED
HCOV NL63 RNA NPH QL NAA+NON-PROBE: NOT DETECTED
HCOV OC43 RNA NPH QL NAA+NON-PROBE: NOT DETECTED
HCT VFR BLD AUTO: 27.8 % (ref 36.5–49.3)
HCT VFR BLD AUTO: 29 % (ref 36.5–49.3)
HCT VFR BLD AUTO: 29.3 % (ref 36.5–49.3)
HCT VFR BLD AUTO: 29.7 % (ref 36.5–49.3)
HCT VFR BLD AUTO: 30.3 % (ref 36.5–49.3)
HCT VFR BLD AUTO: 30.4 % (ref 36.5–49.3)
HCT VFR BLD AUTO: 31.2 % (ref 36.5–49.3)
HCT VFR BLD AUTO: 31.3 % (ref 36.5–49.3)
HCT VFR BLD AUTO: 32 % (ref 36.5–49.3)
HCT VFR BLD AUTO: 32 % (ref 36.5–49.3)
HCT VFR BLD AUTO: 32.7 % (ref 36.5–49.3)
HCT VFR BLD AUTO: 33.6 % (ref 36.5–49.3)
HCT VFR BLD AUTO: 35.8 % (ref 36.5–49.3)
HCT VFR BLD AUTO: 38 % (ref 36.5–49.3)
HGB BLD-MCNC: 10 G/DL (ref 12–17)
HGB BLD-MCNC: 10 G/DL (ref 12–17)
HGB BLD-MCNC: 10.3 G/DL (ref 12–17)
HGB BLD-MCNC: 10.6 G/DL (ref 12–17)
HGB BLD-MCNC: 10.9 G/DL (ref 12–17)
HGB BLD-MCNC: 11 G/DL (ref 12–17)
HGB BLD-MCNC: 11.5 G/DL (ref 12–17)
HGB BLD-MCNC: 11.9 G/DL (ref 12–17)
HGB BLD-MCNC: 12.6 G/DL (ref 12–17)
HGB BLD-MCNC: 9 G/DL (ref 12–17)
HGB BLD-MCNC: 9.9 G/DL (ref 12–17)
HGB BLD-MCNC: 9.9 G/DL (ref 12–17)
HGB UR QL STRIP.AUTO: ABNORMAL
HMPV RNA NPH QL NAA+NON-PROBE: NOT DETECTED
HPIV1 RNA NPH QL NAA+NON-PROBE: NOT DETECTED
HPIV2 RNA NPH QL NAA+NON-PROBE: NOT DETECTED
HPIV3 RNA NPH QL NAA+NON-PROBE: NOT DETECTED
HPIV4 RNA NPH QL NAA+NON-PROBE: NOT DETECTED
IMM GRANULOCYTES # BLD AUTO: 0.19 THOUSAND/UL (ref 0–0.2)
IMM GRANULOCYTES # BLD AUTO: 0.3 THOUSAND/UL (ref 0–0.2)
IMM GRANULOCYTES # BLD AUTO: 0.37 THOUSAND/UL (ref 0–0.2)
IMM GRANULOCYTES # BLD AUTO: 0.43 THOUSAND/UL (ref 0–0.2)
IMM GRANULOCYTES # BLD AUTO: >0.5 THOUSAND/UL (ref 0–0.2)
IMM GRANULOCYTES # BLD AUTO: >0.5 THOUSAND/UL (ref 0–0.2)
IMM GRANULOCYTES NFR BLD AUTO: 1 % (ref 0–2)
IMM GRANULOCYTES NFR BLD AUTO: 2 % (ref 0–2)
IMM GRANULOCYTES NFR BLD AUTO: 3 % (ref 0–2)
IMM GRANULOCYTES NFR BLD AUTO: 4 % (ref 0–2)
INR PPP: 1.35 (ref 0.84–1.19)
IRON SATN MFR SERPL: 32 % (ref 20–50)
IRON SERPL-MCNC: 67 UG/DL (ref 65–175)
KETONES UR STRIP-MCNC: NEGATIVE MG/DL
L PNEUMO1 AG UR QL IA.RAPID: NEGATIVE
LACTATE SERPL-SCNC: 1.1 MMOL/L (ref 0.5–2)
LACTATE SERPL-SCNC: 1.4 MMOL/L (ref 0.5–2)
LACTATE SERPL-SCNC: 2.4 MMOL/L (ref 0.5–2)
LEUKOCYTE ESTERASE UR QL STRIP: NEGATIVE
LG PLATELETS BLD QL SMEAR: PRESENT
LIPASE SERPL-CCNC: 47 U/L (ref 11–82)
LYMPHOCYTES # BLD AUTO: 1.25 THOUSAND/UL (ref 0.6–4.47)
LYMPHOCYTES # BLD AUTO: 1.4 THOUSANDS/ÂΜL (ref 0.6–4.47)
LYMPHOCYTES # BLD AUTO: 1.52 THOUSAND/UL (ref 0.6–4.47)
LYMPHOCYTES # BLD AUTO: 1.81 THOUSANDS/ÂΜL (ref 0.6–4.47)
LYMPHOCYTES # BLD AUTO: 1.91 THOUSANDS/ÂΜL (ref 0.6–4.47)
LYMPHOCYTES # BLD AUTO: 2.07 THOUSAND/UL (ref 0.6–4.47)
LYMPHOCYTES # BLD AUTO: 2.52 THOUSANDS/ÂΜL (ref 0.6–4.47)
LYMPHOCYTES # BLD AUTO: 3.09 THOUSANDS/ÂΜL (ref 0.6–4.47)
LYMPHOCYTES # BLD AUTO: 3.29 THOUSANDS/ÂΜL (ref 0.6–4.47)
LYMPHOCYTES # BLD AUTO: 7 % (ref 14–44)
LYMPHOCYTES # BLD AUTO: 8 % (ref 14–44)
LYMPHOCYTES # BLD AUTO: 9 % (ref 14–44)
LYMPHOCYTES NFR BLD AUTO: 11 % (ref 14–44)
LYMPHOCYTES NFR BLD AUTO: 12 % (ref 14–44)
LYMPHOCYTES NFR BLD AUTO: 18 % (ref 14–44)
LYMPHOCYTES NFR BLD AUTO: 7 % (ref 14–44)
M PNEUMO DNA NPH QL NAA+NON-PROBE: NOT DETECTED
MACROCYTES BLD QL AUTO: PRESENT
MAGNESIUM SERPL-MCNC: 1.5 MG/DL (ref 1.9–2.7)
MAGNESIUM SERPL-MCNC: 1.7 MG/DL (ref 1.9–2.7)
MAGNESIUM SERPL-MCNC: 1.9 MG/DL (ref 1.9–2.7)
MAGNESIUM SERPL-MCNC: 1.9 MG/DL (ref 1.9–2.7)
MAGNESIUM SERPL-MCNC: 2.2 MG/DL (ref 1.9–2.7)
MAGNESIUM SERPL-MCNC: 2.2 MG/DL (ref 1.9–2.7)
MCH RBC QN AUTO: 34.8 PG (ref 26.8–34.3)
MCH RBC QN AUTO: 35 PG (ref 26.8–34.3)
MCH RBC QN AUTO: 35 PG (ref 26.8–34.3)
MCH RBC QN AUTO: 35.1 PG (ref 26.8–34.3)
MCH RBC QN AUTO: 35.2 PG (ref 26.8–34.3)
MCH RBC QN AUTO: 35.3 PG (ref 26.8–34.3)
MCH RBC QN AUTO: 35.5 PG (ref 26.8–34.3)
MCH RBC QN AUTO: 35.7 PG (ref 26.8–34.3)
MCH RBC QN AUTO: 35.9 PG (ref 26.8–34.3)
MCH RBC QN AUTO: 35.9 PG (ref 26.8–34.3)
MCH RBC QN AUTO: 36 PG (ref 26.8–34.3)
MCH RBC QN AUTO: 36 PG (ref 26.8–34.3)
MCHC RBC AUTO-ENTMCNC: 32.4 G/DL (ref 31.4–37.4)
MCHC RBC AUTO-ENTMCNC: 32.7 G/DL (ref 31.4–37.4)
MCHC RBC AUTO-ENTMCNC: 33.1 G/DL (ref 31.4–37.4)
MCHC RBC AUTO-ENTMCNC: 33.2 G/DL (ref 31.4–37.4)
MCHC RBC AUTO-ENTMCNC: 33.2 G/DL (ref 31.4–37.4)
MCHC RBC AUTO-ENTMCNC: 33.3 G/DL (ref 31.4–37.4)
MCHC RBC AUTO-ENTMCNC: 33.7 G/DL (ref 31.4–37.4)
MCHC RBC AUTO-ENTMCNC: 33.8 G/DL (ref 31.4–37.4)
MCHC RBC AUTO-ENTMCNC: 33.9 G/DL (ref 31.4–37.4)
MCHC RBC AUTO-ENTMCNC: 33.9 G/DL (ref 31.4–37.4)
MCHC RBC AUTO-ENTMCNC: 34 G/DL (ref 31.4–37.4)
MCHC RBC AUTO-ENTMCNC: 34.2 G/DL (ref 31.4–37.4)
MCHC RBC AUTO-ENTMCNC: 34.4 G/DL (ref 31.4–37.4)
MCHC RBC AUTO-ENTMCNC: 34.5 G/DL (ref 31.4–37.4)
MCV RBC AUTO: 102 FL (ref 82–98)
MCV RBC AUTO: 103 FL (ref 82–98)
MCV RBC AUTO: 103 FL (ref 82–98)
MCV RBC AUTO: 104 FL (ref 82–98)
MCV RBC AUTO: 105 FL (ref 82–98)
MCV RBC AUTO: 105 FL (ref 82–98)
MCV RBC AUTO: 106 FL (ref 82–98)
MCV RBC AUTO: 106 FL (ref 82–98)
MCV RBC AUTO: 107 FL (ref 82–98)
MCV RBC AUTO: 108 FL (ref 82–98)
MCV RBC AUTO: 109 FL (ref 82–98)
MCV RBC AUTO: 111 FL (ref 82–98)
METAMYELOCYTES NFR BLD MANUAL: 3 % (ref 0–1)
METAMYELOCYTES NFR BLD MANUAL: 5 % (ref 0–1)
MISCELLANEOUS LAB TEST RESULT: NORMAL
MONOCYTES # BLD AUTO: 0.36 THOUSAND/UL (ref 0–1.22)
MONOCYTES # BLD AUTO: 0.52 THOUSAND/ÂΜL (ref 0.17–1.22)
MONOCYTES # BLD AUTO: 0.69 THOUSAND/UL (ref 0–1.22)
MONOCYTES # BLD AUTO: 1.07 THOUSAND/ÂΜL (ref 0.17–1.22)
MONOCYTES # BLD AUTO: 1.07 THOUSAND/ÂΜL (ref 0.17–1.22)
MONOCYTES # BLD AUTO: 1.14 THOUSAND/UL (ref 0–1.22)
MONOCYTES # BLD AUTO: 1.16 THOUSAND/ÂΜL (ref 0.17–1.22)
MONOCYTES # BLD AUTO: 1.17 THOUSAND/ÂΜL (ref 0.17–1.22)
MONOCYTES # BLD AUTO: 1.23 THOUSAND/ÂΜL (ref 0.17–1.22)
MONOCYTES NFR BLD AUTO: 3 % (ref 4–12)
MONOCYTES NFR BLD AUTO: 4 % (ref 4–12)
MONOCYTES NFR BLD AUTO: 5 % (ref 4–12)
MONOCYTES NFR BLD AUTO: 6 % (ref 4–12)
MONOCYTES NFR BLD AUTO: 7 % (ref 4–12)
MONOCYTES NFR BLD AUTO: 7 % (ref 4–12)
MONOCYTES NFR BLD: 2 % (ref 4–12)
MONOCYTES NFR BLD: 3 % (ref 4–12)
MONOCYTES NFR BLD: 6 % (ref 4–12)
MRSA NOSE QL CULT: NORMAL
MYCOBACTERIUM SPEC CULT: NORMAL
MYELOCYTES NFR BLD MANUAL: 1 % (ref 0–1)
NEUTROPHILS # BLD AUTO: 13.01 THOUSANDS/ÂΜL (ref 1.85–7.62)
NEUTROPHILS # BLD AUTO: 13.09 THOUSANDS/ÂΜL (ref 1.85–7.62)
NEUTROPHILS # BLD AUTO: 13.87 THOUSANDS/ÂΜL (ref 1.85–7.62)
NEUTROPHILS # BLD AUTO: 17.83 THOUSANDS/ÂΜL (ref 1.85–7.62)
NEUTROPHILS # BLD AUTO: 18.5 THOUSANDS/ÂΜL (ref 1.85–7.62)
NEUTROPHILS # BLD AUTO: 23.24 THOUSANDS/ÂΜL (ref 1.85–7.62)
NEUTROPHILS # BLD MANUAL: 15.16 THOUSAND/UL (ref 1.85–7.62)
NEUTROPHILS # BLD MANUAL: 15.55 THOUSAND/UL (ref 1.85–7.62)
NEUTROPHILS # BLD MANUAL: 19.57 THOUSAND/UL (ref 1.85–7.62)
NEUTS BAND NFR BLD MANUAL: 1 % (ref 0–8)
NEUTS BAND NFR BLD MANUAL: 4 % (ref 0–8)
NEUTS BAND NFR BLD MANUAL: 7 % (ref 0–8)
NEUTS SEG NFR BLD AUTO: 72 % (ref 43–75)
NEUTS SEG NFR BLD AUTO: 73 % (ref 43–75)
NEUTS SEG NFR BLD AUTO: 78 % (ref 43–75)
NEUTS SEG NFR BLD AUTO: 80 % (ref 43–75)
NEUTS SEG NFR BLD AUTO: 80 % (ref 43–75)
NEUTS SEG NFR BLD AUTO: 81 % (ref 43–75)
NEUTS SEG NFR BLD AUTO: 83 % (ref 43–75)
NEUTS SEG NFR BLD AUTO: 86 % (ref 43–75)
NEUTS SEG NFR BLD AUTO: 88 % (ref 43–75)
NITRITE UR QL STRIP: NEGATIVE
NON-SQ EPI CELLS URNS QL MICRO: ABNORMAL /HPF
NRBC BLD AUTO-RTO: 0 /100 WBCS
NRBC BLD AUTO-RTO: 1 /100 WBCS
NRBC BLD AUTO-RTO: 2 /100 WBC (ref 0–2)
NRBC BLD AUTO-RTO: 2 /100 WBCS
NRBC BLD AUTO-RTO: 3 /100 WBCS
NRBC BLD AUTO-RTO: 4 /100 WBCS
OSMOLALITY UR/SERPL-RTO: 281 MMOL/KG (ref 282–298)
OSMOLALITY UR: 741 MMOL/KG
OVALOCYTES BLD QL SMEAR: PRESENT
P AXIS: 45 DEGREES
P AXIS: 51 DEGREES
P AXIS: 56 DEGREES
P JIROVECII AG SPEC QL IF: ABNORMAL
P JIROVECII AG SPEC QL IF: ABNORMAL
PH UR STRIP.AUTO: 6 [PH]
PHOSPHATE SERPL-MCNC: 3 MG/DL (ref 2.3–4.1)
PHOSPHATE SERPL-MCNC: 4.4 MG/DL (ref 2.3–4.1)
PLATELET # BLD AUTO: 268 THOUSANDS/UL (ref 149–390)
PLATELET # BLD AUTO: 301 THOUSANDS/UL (ref 149–390)
PLATELET # BLD AUTO: 327 THOUSANDS/UL (ref 149–390)
PLATELET # BLD AUTO: 358 THOUSANDS/UL (ref 149–390)
PLATELET # BLD AUTO: 378 THOUSANDS/UL (ref 149–390)
PLATELET # BLD AUTO: 389 THOUSANDS/UL (ref 149–390)
PLATELET # BLD AUTO: 434 THOUSANDS/UL (ref 149–390)
PLATELET # BLD AUTO: 497 THOUSANDS/UL (ref 149–390)
PLATELET # BLD AUTO: 499 THOUSANDS/UL (ref 149–390)
PLATELET # BLD AUTO: 521 THOUSANDS/UL (ref 149–390)
PLATELET # BLD AUTO: 547 THOUSANDS/UL (ref 149–390)
PLATELET # BLD AUTO: 576 THOUSANDS/UL (ref 149–390)
PLATELET # BLD AUTO: 609 THOUSANDS/UL (ref 149–390)
PLATELET # BLD AUTO: 688 THOUSANDS/UL (ref 149–390)
PLATELET BLD QL SMEAR: ABNORMAL
PLATELET BLD QL SMEAR: ABNORMAL
PLATELET BLD QL SMEAR: ADEQUATE
PMV BLD AUTO: 10 FL (ref 8.9–12.7)
PMV BLD AUTO: 10.1 FL (ref 8.9–12.7)
PMV BLD AUTO: 10.2 FL (ref 8.9–12.7)
PMV BLD AUTO: 10.3 FL (ref 8.9–12.7)
PMV BLD AUTO: 10.4 FL (ref 8.9–12.7)
PMV BLD AUTO: 9.5 FL (ref 8.9–12.7)
PMV BLD AUTO: 9.5 FL (ref 8.9–12.7)
PMV BLD AUTO: 9.6 FL (ref 8.9–12.7)
PMV BLD AUTO: 9.8 FL (ref 8.9–12.7)
PMV BLD AUTO: 9.9 FL (ref 8.9–12.7)
POLYCHROMASIA BLD QL SMEAR: PRESENT
POTASSIUM SERPL-SCNC: 3 MMOL/L (ref 3.5–5.3)
POTASSIUM SERPL-SCNC: 3.3 MMOL/L (ref 3.5–5.3)
POTASSIUM SERPL-SCNC: 3.6 MMOL/L (ref 3.5–5.3)
POTASSIUM SERPL-SCNC: 3.7 MMOL/L (ref 3.5–5.3)
POTASSIUM SERPL-SCNC: 3.8 MMOL/L (ref 3.5–5.3)
POTASSIUM SERPL-SCNC: 3.9 MMOL/L (ref 3.5–5.3)
POTASSIUM SERPL-SCNC: 4.2 MMOL/L (ref 3.5–5.3)
POTASSIUM SERPL-SCNC: 4.2 MMOL/L (ref 3.5–5.3)
POTASSIUM SERPL-SCNC: 4.3 MMOL/L (ref 3.5–5.3)
POTASSIUM SERPL-SCNC: 4.3 MMOL/L (ref 3.5–5.3)
POTASSIUM SERPL-SCNC: 4.4 MMOL/L (ref 3.5–5.3)
POTASSIUM SERPL-SCNC: 4.5 MMOL/L (ref 3.5–5.3)
POTASSIUM SERPL-SCNC: 4.7 MMOL/L (ref 3.5–5.3)
POTASSIUM SERPL-SCNC: 5 MMOL/L (ref 3.5–5.3)
PR INTERVAL: 152 MS
PR INTERVAL: 158 MS
PR INTERVAL: 160 MS
PR INTERVAL: 416 MS
PROCALCITONIN SERPL-MCNC: 0.28 NG/ML
PROCALCITONIN SERPL-MCNC: 0.57 NG/ML
PROCALCITONIN SERPL-MCNC: 0.59 NG/ML
PROCALCITONIN SERPL-MCNC: 0.73 NG/ML
PROCALCITONIN SERPL-MCNC: 0.74 NG/ML
PROCALCITONIN SERPL-MCNC: 0.79 NG/ML
PROT SERPL-MCNC: 4.8 G/DL (ref 6.4–8.4)
PROT SERPL-MCNC: 5 G/DL (ref 6.4–8.4)
PROT SERPL-MCNC: 5.1 G/DL (ref 6.4–8.4)
PROT SERPL-MCNC: 5.1 G/DL (ref 6.4–8.4)
PROT SERPL-MCNC: 5.5 G/DL (ref 6.4–8.4)
PROT SERPL-MCNC: 6 G/DL (ref 6.4–8.4)
PROT UR STRIP-MCNC: ABNORMAL MG/DL
PROTHROMBIN TIME: 16.6 SECONDS (ref 11.6–14.5)
QRS AXIS: 52 DEGREES
QRS AXIS: 53 DEGREES
QRS AXIS: 67 DEGREES
QRS AXIS: 80 DEGREES
QRSD INTERVAL: 72 MS
QRSD INTERVAL: 74 MS
QRSD INTERVAL: 78 MS
QRSD INTERVAL: 79 MS
QT INTERVAL: 263 MS
QT INTERVAL: 346 MS
QT INTERVAL: 350 MS
QT INTERVAL: 380 MS
QTC INTERVAL: 420 MS
QTC INTERVAL: 423 MS
QTC INTERVAL: 441 MS
QTC INTERVAL: 469 MS
RBC # BLD AUTO: 2.51 MILLION/UL (ref 3.88–5.62)
RBC # BLD AUTO: 2.8 MILLION/UL (ref 3.88–5.62)
RBC # BLD AUTO: 2.82 MILLION/UL (ref 3.88–5.62)
RBC # BLD AUTO: 2.82 MILLION/UL (ref 3.88–5.62)
RBC # BLD AUTO: 2.83 MILLION/UL (ref 3.88–5.62)
RBC # BLD AUTO: 2.86 MILLION/UL (ref 3.88–5.62)
RBC # BLD AUTO: 2.95 MILLION/UL (ref 3.88–5.62)
RBC # BLD AUTO: 3.01 MILLION/UL (ref 3.88–5.62)
RBC # BLD AUTO: 3.02 MILLION/UL (ref 3.88–5.62)
RBC # BLD AUTO: 3.11 MILLION/UL (ref 3.88–5.62)
RBC # BLD AUTO: 3.12 MILLION/UL (ref 3.88–5.62)
RBC # BLD AUTO: 3.28 MILLION/UL (ref 3.88–5.62)
RBC # BLD AUTO: 3.31 MILLION/UL (ref 3.88–5.62)
RBC # BLD AUTO: 3.62 MILLION/UL (ref 3.88–5.62)
RBC #/AREA URNS AUTO: ABNORMAL /HPF
RBC MORPH BLD: NORMAL
RBC MORPH BLD: NORMAL
RHODAMINE-AURAMINE STN SPEC: NORMAL
RSV RNA NPH QL NAA+NON-PROBE: NOT DETECTED
RSV RNA RESP QL NAA+PROBE: NEGATIVE
RV+EV RNA NPH QL NAA+NON-PROBE: NOT DETECTED
S PNEUM AG UR QL: NEGATIVE
SARS-COV-2 RNA NPH QL NAA+NON-PROBE: NOT DETECTED
SARS-COV-2 RNA RESP QL NAA+PROBE: NEGATIVE
SARS-COV-2 RNA SPEC QL NAA+PROBE: NORMAL
SARS-COV-2 RNA SPEC QL NAA+PROBE: NOT DETECTED
SCAN RESULT: NORMAL
SODIUM 24H UR-SCNC: 65 MOL/L
SODIUM 24H UR-SCNC: 86 MOL/L
SODIUM SERPL-SCNC: 125 MMOL/L (ref 135–147)
SODIUM SERPL-SCNC: 126 MMOL/L (ref 135–147)
SODIUM SERPL-SCNC: 127 MMOL/L (ref 135–147)
SODIUM SERPL-SCNC: 127 MMOL/L (ref 135–147)
SODIUM SERPL-SCNC: 128 MMOL/L (ref 135–147)
SODIUM SERPL-SCNC: 128 MMOL/L (ref 135–147)
SODIUM SERPL-SCNC: 129 MMOL/L (ref 135–147)
SODIUM SERPL-SCNC: 130 MMOL/L (ref 135–147)
SODIUM SERPL-SCNC: 132 MMOL/L (ref 135–147)
SODIUM SERPL-SCNC: 134 MMOL/L (ref 135–147)
SODIUM SERPL-SCNC: 134 MMOL/L (ref 135–147)
SODIUM SERPL-SCNC: 135 MMOL/L (ref 135–147)
SP GR UR STRIP.AUTO: 1.03 (ref 1–1.03)
T WAVE AXIS: -20 DEGREES
T WAVE AXIS: -50 DEGREES
T WAVE AXIS: 14 DEGREES
T WAVE AXIS: 24 DEGREES
TIBC SERPL-MCNC: 211 UG/DL (ref 250–450)
TSH SERPL DL<=0.05 MIU/L-ACNC: 1.95 UIU/ML (ref 0.45–4.5)
URATE CRY URNS QL MICRO: ABNORMAL /HPF
URATE SERPL-MCNC: 2.8 MG/DL (ref 3.5–8.5)
URATE SERPL-MCNC: 3.2 MG/DL (ref 3.5–8.5)
UROBILINOGEN UR STRIP-ACNC: <2 MG/DL
VANCOMYCIN SERPL-MCNC: 7.5 UG/ML (ref 10–20)
VARIANT LYMPHS # BLD AUTO: 1 %
VENTRICULAR RATE: 108 BPM
VENTRICULAR RATE: 155 BPM
VENTRICULAR RATE: 81 BPM
VENTRICULAR RATE: 89 BPM
VIT B12 SERPL-MCNC: 1512 PG/ML (ref 100–900)
WBC # BLD AUTO: 16.64 THOUSAND/UL (ref 4.31–10.16)
WBC # BLD AUTO: 17.26 THOUSAND/UL (ref 4.31–10.16)
WBC # BLD AUTO: 17.87 THOUSAND/UL (ref 4.31–10.16)
WBC # BLD AUTO: 18.07 THOUSAND/UL (ref 4.31–10.16)
WBC # BLD AUTO: 18.1 THOUSAND/UL (ref 4.31–10.16)
WBC # BLD AUTO: 18.11 THOUSAND/UL (ref 4.31–10.16)
WBC # BLD AUTO: 18.95 THOUSAND/UL (ref 4.31–10.16)
WBC # BLD AUTO: 19.1 THOUSAND/UL (ref 4.31–10.16)
WBC # BLD AUTO: 19.84 THOUSAND/UL (ref 4.31–10.16)
WBC # BLD AUTO: 20.1 THOUSAND/UL (ref 4.31–10.16)
WBC # BLD AUTO: 22.2 THOUSAND/UL (ref 4.31–10.16)
WBC # BLD AUTO: 22.99 THOUSAND/UL (ref 4.31–10.16)
WBC # BLD AUTO: 23.02 THOUSAND/UL (ref 4.31–10.16)
WBC # BLD AUTO: 28.11 THOUSAND/UL (ref 4.31–10.16)
WBC #/AREA URNS AUTO: ABNORMAL /HPF

## 2023-01-01 PROCEDURE — 0B9D8ZX DRAINAGE OF RIGHT MIDDLE LUNG LOBE, VIA NATURAL OR ARTIFICIAL OPENING ENDOSCOPIC, DIAGNOSTIC: ICD-10-PCS | Performed by: INTERNAL MEDICINE

## 2023-01-01 RX ORDER — DEXAMETHASONE 4 MG/1
4 TABLET ORAL EVERY 8 HOURS
Status: DISCONTINUED | OUTPATIENT
Start: 2023-01-01 | End: 2023-01-01

## 2023-01-01 RX ORDER — ALBUTEROL SULFATE 2.5 MG/3ML
5 SOLUTION RESPIRATORY (INHALATION) ONCE
Status: COMPLETED | OUTPATIENT
Start: 2023-01-01 | End: 2023-01-01

## 2023-01-01 RX ORDER — SODIUM CHLORIDE 9 MG/ML
25 INJECTION, SOLUTION INTRAVENOUS CONTINUOUS
Status: DISCONTINUED | OUTPATIENT
Start: 2023-01-01 | End: 2023-01-01

## 2023-01-01 RX ORDER — METOPROLOL SUCCINATE 25 MG/1
12.5 TABLET, EXTENDED RELEASE ORAL DAILY
Status: DISCONTINUED | OUTPATIENT
Start: 2023-01-01 | End: 2023-01-01

## 2023-01-01 RX ORDER — METOPROLOL TARTRATE 5 MG/5ML
2.5 INJECTION INTRAVENOUS ONCE
Status: COMPLETED | OUTPATIENT
Start: 2023-01-01 | End: 2023-01-01

## 2023-01-01 RX ORDER — METOPROLOL TARTRATE 5 MG/5ML
2.5 INJECTION INTRAVENOUS EVERY 6 HOURS PRN
Status: DISCONTINUED | OUTPATIENT
Start: 2023-01-01 | End: 2023-01-01 | Stop reason: HOSPADM

## 2023-01-01 RX ORDER — POTASSIUM CHLORIDE 20 MEQ/1
40 TABLET, EXTENDED RELEASE ORAL ONCE
Status: COMPLETED | OUTPATIENT
Start: 2023-01-01 | End: 2023-01-01

## 2023-01-01 RX ORDER — SIMETHICONE 80 MG
160 TABLET,CHEWABLE ORAL ONCE
Status: COMPLETED | OUTPATIENT
Start: 2023-01-01 | End: 2023-01-01

## 2023-01-01 RX ORDER — MAGNESIUM SULFATE HEPTAHYDRATE 40 MG/ML
2 INJECTION, SOLUTION INTRAVENOUS ONCE
Status: COMPLETED | OUTPATIENT
Start: 2023-01-01 | End: 2023-01-01

## 2023-01-01 RX ORDER — ALPRAZOLAM 0.5 MG/1
0.5 TABLET ORAL
Status: DISCONTINUED | OUTPATIENT
Start: 2023-01-01 | End: 2023-01-01 | Stop reason: HOSPADM

## 2023-01-01 RX ORDER — HYDROCHLOROTHIAZIDE 25 MG/1
25 TABLET ORAL DAILY
Status: DISCONTINUED | OUTPATIENT
Start: 2023-01-01 | End: 2023-01-01

## 2023-01-01 RX ORDER — DEXAMETHASONE SODIUM PHOSPHATE 4 MG/ML
2 INJECTION, SOLUTION INTRA-ARTICULAR; INTRALESIONAL; INTRAMUSCULAR; INTRAVENOUS; SOFT TISSUE EVERY 12 HOURS SCHEDULED
Status: DISCONTINUED | OUTPATIENT
Start: 2023-01-01 | End: 2023-01-01 | Stop reason: HOSPADM

## 2023-01-01 RX ORDER — DEXTROSE AND SODIUM CHLORIDE 5; .9 G/100ML; G/100ML
50 INJECTION, SOLUTION INTRAVENOUS CONTINUOUS
Status: DISCONTINUED | OUTPATIENT
Start: 2023-01-01 | End: 2023-01-01

## 2023-01-01 RX ORDER — PANTOPRAZOLE SODIUM 40 MG/1
40 TABLET, DELAYED RELEASE ORAL
Status: DISCONTINUED | OUTPATIENT
Start: 2023-01-01 | End: 2023-01-01

## 2023-01-01 RX ORDER — SODIUM BICARBONATE 650 MG/1
1300 TABLET ORAL
Status: DISCONTINUED | OUTPATIENT
Start: 2023-01-01 | End: 2023-01-01

## 2023-01-01 RX ORDER — PRAZOSIN HYDROCHLORIDE 1 MG/1
1 CAPSULE ORAL
Status: DISCONTINUED | OUTPATIENT
Start: 2023-01-01 | End: 2023-01-01

## 2023-01-01 RX ORDER — SIMETHICONE 80 MG
80 TABLET,CHEWABLE ORAL ONCE
Status: COMPLETED | OUTPATIENT
Start: 2023-01-01 | End: 2023-01-01

## 2023-01-01 RX ORDER — SODIUM BICARBONATE 650 MG/1
650 TABLET ORAL
Status: DISCONTINUED | OUTPATIENT
Start: 2023-01-01 | End: 2023-01-01

## 2023-01-01 RX ORDER — ACETAMINOPHEN 325 MG/1
650 TABLET ORAL EVERY 6 HOURS PRN
Status: DISCONTINUED | OUTPATIENT
Start: 2023-01-01 | End: 2023-01-01 | Stop reason: HOSPADM

## 2023-01-01 RX ORDER — SODIUM CHLORIDE 1 G/1
1 TABLET ORAL
Status: DISCONTINUED | OUTPATIENT
Start: 2023-01-01 | End: 2023-01-01

## 2023-01-01 RX ORDER — ALBUTEROL SULFATE 90 UG/1
2 AEROSOL, METERED RESPIRATORY (INHALATION) EVERY 4 HOURS PRN
Status: DISCONTINUED | OUTPATIENT
Start: 2023-01-01 | End: 2023-01-01 | Stop reason: HOSPADM

## 2023-01-01 RX ORDER — SODIUM CHLORIDE, SODIUM GLUCONATE, SODIUM ACETATE, POTASSIUM CHLORIDE, MAGNESIUM CHLORIDE, SODIUM PHOSPHATE, DIBASIC, AND POTASSIUM PHOSPHATE .53; .5; .37; .037; .03; .012; .00082 G/100ML; G/100ML; G/100ML; G/100ML; G/100ML; G/100ML; G/100ML
1000 INJECTION, SOLUTION INTRAVENOUS ONCE
Status: COMPLETED | OUTPATIENT
Start: 2023-01-01 | End: 2023-01-01

## 2023-01-01 RX ORDER — DEXAMETHASONE 2 MG/1
2 TABLET ORAL EVERY 12 HOURS SCHEDULED
Status: DISCONTINUED | OUTPATIENT
Start: 2023-01-01 | End: 2023-01-01

## 2023-01-01 RX ORDER — LORAZEPAM 1 MG/1
1 TABLET ORAL EVERY 8 HOURS PRN
Qty: 10 TABLET | Refills: 0 | Status: SHIPPED | OUTPATIENT
Start: 2023-01-01 | End: 2023-01-01

## 2023-01-01 RX ORDER — DEXAMETHASONE 4 MG/1
4 TABLET ORAL EVERY 12 HOURS SCHEDULED
Status: DISCONTINUED | OUTPATIENT
Start: 2023-01-01 | End: 2023-01-01

## 2023-01-01 RX ORDER — LORAZEPAM 2 MG/ML
1 INJECTION INTRAMUSCULAR ONCE
Status: COMPLETED | OUTPATIENT
Start: 2023-01-01 | End: 2023-01-01

## 2023-01-01 RX ORDER — BENZONATATE 100 MG/1
100 CAPSULE ORAL 3 TIMES DAILY PRN
Status: DISCONTINUED | OUTPATIENT
Start: 2023-01-01 | End: 2023-01-01 | Stop reason: HOSPADM

## 2023-01-01 RX ORDER — SODIUM CHLORIDE 9 MG/ML
125 INJECTION, SOLUTION INTRAVENOUS CONTINUOUS
Status: CANCELLED | OUTPATIENT
Start: 2023-01-01

## 2023-01-01 RX ORDER — GUAIFENESIN/DEXTROMETHORPHAN 100-10MG/5
10 SYRUP ORAL EVERY 4 HOURS PRN
Status: DISCONTINUED | OUTPATIENT
Start: 2023-01-01 | End: 2023-01-01 | Stop reason: HOSPADM

## 2023-01-01 RX ORDER — OXYCODONE HCL 5 MG/5 ML
5 SOLUTION, ORAL ORAL EVERY 4 HOURS PRN
Status: DISCONTINUED | OUTPATIENT
Start: 2023-01-01 | End: 2023-01-01 | Stop reason: HOSPADM

## 2023-01-01 RX ORDER — SODIUM CHLORIDE 9 MG/ML
150 INJECTION, SOLUTION INTRAVENOUS CONTINUOUS
Status: DISCONTINUED | OUTPATIENT
Start: 2023-01-01 | End: 2023-01-01 | Stop reason: HOSPADM

## 2023-01-01 RX ORDER — OXYCODONE HCL 5 MG/5 ML
5 SOLUTION, ORAL ORAL EVERY 4 HOURS PRN
Qty: 15 ML | Refills: 0 | Status: SHIPPED | OUTPATIENT
Start: 2023-01-01 | End: 2023-01-01

## 2023-01-01 RX ORDER — ONDANSETRON 2 MG/ML
4 INJECTION INTRAMUSCULAR; INTRAVENOUS EVERY 6 HOURS PRN
Status: DISCONTINUED | OUTPATIENT
Start: 2023-01-01 | End: 2023-01-01 | Stop reason: HOSPADM

## 2023-01-01 RX ORDER — OXYCODONE HCL 5 MG/5 ML
10 SOLUTION, ORAL ORAL EVERY 4 HOURS PRN
Status: DISCONTINUED | OUTPATIENT
Start: 2023-01-01 | End: 2023-01-01 | Stop reason: HOSPADM

## 2023-01-01 RX ORDER — MEMANTINE HYDROCHLORIDE 5 MG/1
10 TABLET ORAL 2 TIMES DAILY
Status: DISCONTINUED | OUTPATIENT
Start: 2023-01-01 | End: 2023-01-01

## 2023-01-01 RX ORDER — PANTOPRAZOLE SODIUM 40 MG/10ML
40 INJECTION, POWDER, LYOPHILIZED, FOR SOLUTION INTRAVENOUS
Status: DISCONTINUED | OUTPATIENT
Start: 2023-01-01 | End: 2023-01-01 | Stop reason: HOSPADM

## 2023-01-01 RX ORDER — HYDROMORPHONE HCL IN WATER/PF 6 MG/30 ML
0.2 PATIENT CONTROLLED ANALGESIA SYRINGE INTRAVENOUS ONCE
Status: COMPLETED | OUTPATIENT
Start: 2023-01-01 | End: 2023-01-01

## 2023-01-01 RX ORDER — SULFAMETHOXAZOLE AND TRIMETHOPRIM 800; 160 MG/1; MG/1
2 TABLET ORAL EVERY 6 HOURS
Status: DISCONTINUED | OUTPATIENT
Start: 2023-01-01 | End: 2023-01-01

## 2023-01-01 RX ORDER — VANCOMYCIN HYDROCHLORIDE 125 MG/1
125 CAPSULE ORAL EVERY 12 HOURS SCHEDULED
Status: COMPLETED | OUTPATIENT
Start: 2023-01-01 | End: 2023-01-01

## 2023-01-01 RX ORDER — METOPROLOL TARTRATE 5 MG/5ML
5 INJECTION INTRAVENOUS ONCE
Status: COMPLETED | OUTPATIENT
Start: 2023-01-01 | End: 2023-01-01

## 2023-01-01 RX ORDER — DEXTROSE MONOHYDRATE 50 MG/ML
50 INJECTION, SOLUTION INTRAVENOUS CONTINUOUS
Status: DISCONTINUED | OUTPATIENT
Start: 2023-01-01 | End: 2023-01-01

## 2023-01-01 RX ORDER — OXYCODONE HYDROCHLORIDE 5 MG/1
5 TABLET ORAL EVERY 4 HOURS PRN
Qty: 10 TABLET | Refills: 0 | Status: SHIPPED | OUTPATIENT
Start: 2023-01-01 | End: 2023-01-01

## 2023-01-01 RX ORDER — SIMETHICONE 80 MG
80 TABLET,CHEWABLE ORAL
Status: DISCONTINUED | OUTPATIENT
Start: 2023-01-01 | End: 2023-01-01

## 2023-01-01 RX ORDER — POLYETHYLENE GLYCOL 3350 17 G/17G
17 POWDER, FOR SOLUTION ORAL DAILY PRN
Status: DISCONTINUED | OUTPATIENT
Start: 2023-01-01 | End: 2023-01-01 | Stop reason: HOSPADM

## 2023-01-01 RX ORDER — SODIUM CHLORIDE, SODIUM GLUCONATE, SODIUM ACETATE, POTASSIUM CHLORIDE, MAGNESIUM CHLORIDE, SODIUM PHOSPHATE, DIBASIC, AND POTASSIUM PHOSPHATE .53; .5; .37; .037; .03; .012; .00082 G/100ML; G/100ML; G/100ML; G/100ML; G/100ML; G/100ML; G/100ML
500 INJECTION, SOLUTION INTRAVENOUS ONCE
Status: COMPLETED | OUTPATIENT
Start: 2023-01-01 | End: 2023-01-01

## 2023-01-01 RX ORDER — SULFAMETHOXAZOLE AND TRIMETHOPRIM 800; 160 MG/1; MG/1
2 TABLET ORAL EVERY 8 HOURS SCHEDULED
Status: DISCONTINUED | OUTPATIENT
Start: 2023-01-01 | End: 2023-01-01

## 2023-01-01 RX ORDER — IPRATROPIUM BROMIDE AND ALBUTEROL SULFATE 2.5; .5 MG/3ML; MG/3ML
3 SOLUTION RESPIRATORY (INHALATION)
Status: DISCONTINUED | OUTPATIENT
Start: 2023-01-01 | End: 2023-01-01

## 2023-01-01 RX ORDER — HYDROMORPHONE HCL/PF 1 MG/ML
0.5 SYRINGE (ML) INJECTION EVERY 4 HOURS PRN
Status: DISCONTINUED | OUTPATIENT
Start: 2023-01-01 | End: 2023-01-01 | Stop reason: HOSPADM

## 2023-01-01 RX ORDER — COSYNTROPIN 0.25 MG/ML
0.25 INJECTION, POWDER, FOR SOLUTION INTRAMUSCULAR; INTRAVENOUS ONCE
Status: COMPLETED | OUTPATIENT
Start: 2023-01-01 | End: 2023-01-01

## 2023-01-01 RX ORDER — DABIGATRAN ETEXILATE 150 MG/1
150 CAPSULE ORAL EVERY 12 HOURS SCHEDULED
Status: DISCONTINUED | OUTPATIENT
Start: 2023-01-01 | End: 2023-01-01

## 2023-01-01 RX ORDER — MAGNESIUM HYDROXIDE/ALUMINUM HYDROXICE/SIMETHICONE 120; 1200; 1200 MG/30ML; MG/30ML; MG/30ML
30 SUSPENSION ORAL EVERY 6 HOURS PRN
Status: DISCONTINUED | OUTPATIENT
Start: 2023-01-01 | End: 2023-01-01 | Stop reason: HOSPADM

## 2023-01-01 RX ADMIN — SULFAMETHOXAZOLE AND TRIMETHOPRIM 320 MG OF TRIMETHOPRIM: 80; 16 INJECTION, SOLUTION, CONCENTRATE INTRAVENOUS at 03:38

## 2023-01-01 RX ADMIN — DEXAMETHASONE 4 MG: 4 TABLET ORAL at 05:52

## 2023-01-01 RX ADMIN — CARBAMIDE PEROXIDE 6.5% 5 DROP: 6.5 LIQUID AURICULAR (OTIC) at 10:00

## 2023-01-01 RX ADMIN — PANTOPRAZOLE SODIUM 40 MG: 40 TABLET, DELAYED RELEASE ORAL at 06:01

## 2023-01-01 RX ADMIN — DEXAMETHASONE 4 MG: 4 TABLET ORAL at 05:54

## 2023-01-01 RX ADMIN — PRAZOSIN HYDROCHLORIDE 1 MG: 1 CAPSULE ORAL at 21:45

## 2023-01-01 RX ADMIN — DEXAMETHASONE 4 MG: 4 TABLET ORAL at 22:34

## 2023-01-01 RX ADMIN — METOPROLOL SUCCINATE 12.5 MG: 25 TABLET, EXTENDED RELEASE ORAL at 09:45

## 2023-01-01 RX ADMIN — DABIGATRAN ETEXILATE MESYLATE 150 MG: 150 CAPSULE ORAL at 08:48

## 2023-01-01 RX ADMIN — SULFAMETHOXAZOLE AND TRIMETHOPRIM 2 TABLET: 800; 160 TABLET ORAL at 21:33

## 2023-01-01 RX ADMIN — SULFAMETHOXAZOLE AND TRIMETHOPRIM 380 MG OF TRIMETHOPRIM: 80; 16 INJECTION, SOLUTION, CONCENTRATE INTRAVENOUS at 19:27

## 2023-01-01 RX ADMIN — SULFAMETHOXAZOLE AND TRIMETHOPRIM 320 MG OF TRIMETHOPRIM: 80; 16 INJECTION, SOLUTION, CONCENTRATE INTRAVENOUS at 21:40

## 2023-01-01 RX ADMIN — SODIUM BICARBONATE 650 MG TABLET 1300 MG: at 09:15

## 2023-01-01 RX ADMIN — ACETAMINOPHEN 325MG 650 MG: 325 TABLET ORAL at 09:16

## 2023-01-01 RX ADMIN — VANCOMYCIN HYDROCHLORIDE 125 MG: 125 CAPSULE ORAL at 08:16

## 2023-01-01 RX ADMIN — PRAZOSIN HYDROCHLORIDE 1 MG: 1 CAPSULE ORAL at 21:55

## 2023-01-01 RX ADMIN — DEXAMETHASONE 4 MG: 4 TABLET ORAL at 14:24

## 2023-01-01 RX ADMIN — METOPROLOL SUCCINATE 12.5 MG: 25 TABLET, EXTENDED RELEASE ORAL at 08:14

## 2023-01-01 RX ADMIN — MEMANTINE 10 MG: 5 TABLET ORAL at 17:12

## 2023-01-01 RX ADMIN — GUAIFENESIN AND DEXTROMETHORPHAN 10 ML: 100; 10 SYRUP ORAL at 14:05

## 2023-01-01 RX ADMIN — DEXAMETHASONE 4 MG: 4 TABLET ORAL at 21:13

## 2023-01-01 RX ADMIN — PIPERACILLIN SODIUM AND TAZOBACTAM SODIUM 3.38 G: 36; 4.5 INJECTION, POWDER, LYOPHILIZED, FOR SOLUTION INTRAVENOUS at 08:37

## 2023-01-01 RX ADMIN — BENZONATATE 100 MG: 100 CAPSULE ORAL at 23:25

## 2023-01-01 RX ADMIN — MEMANTINE 10 MG: 5 TABLET ORAL at 17:24

## 2023-01-01 RX ADMIN — PANTOPRAZOLE SODIUM 40 MG: 40 TABLET, DELAYED RELEASE ORAL at 05:51

## 2023-01-01 RX ADMIN — MEMANTINE 10 MG: 5 TABLET ORAL at 18:24

## 2023-01-01 RX ADMIN — IPRATROPIUM BROMIDE AND ALBUTEROL SULFATE 3 ML: 2.5; .5 SOLUTION RESPIRATORY (INHALATION) at 02:03

## 2023-01-01 RX ADMIN — SODIUM BICARBONATE 650 MG TABLET 1300 MG: at 17:39

## 2023-01-01 RX ADMIN — HYDROMORPHONE HYDROCHLORIDE 0.2 MG: 0.2 INJECTION, SOLUTION INTRAMUSCULAR; INTRAVENOUS; SUBCUTANEOUS at 11:50

## 2023-01-01 RX ADMIN — HYDROCHLOROTHIAZIDE 25 MG: 25 TABLET ORAL at 08:01

## 2023-01-01 RX ADMIN — GUAIFENESIN AND DEXTROMETHORPHAN 10 ML: 100; 10 SYRUP ORAL at 05:57

## 2023-01-01 RX ADMIN — METOPROLOL SUCCINATE 12.5 MG: 25 TABLET, EXTENDED RELEASE ORAL at 09:38

## 2023-01-01 RX ADMIN — DEXAMETHASONE 4 MG: 4 TABLET ORAL at 13:46

## 2023-01-01 RX ADMIN — SULFAMETHOXAZOLE AND TRIMETHOPRIM 2 TABLET: 800; 160 TABLET ORAL at 05:11

## 2023-01-01 RX ADMIN — SULFAMETHOXAZOLE AND TRIMETHOPRIM 2 TABLET: 800; 160 TABLET ORAL at 03:00

## 2023-01-01 RX ADMIN — HYDROCHLOROTHIAZIDE 25 MG: 25 TABLET ORAL at 09:45

## 2023-01-01 RX ADMIN — PANTOPRAZOLE SODIUM 40 MG: 40 TABLET, DELAYED RELEASE ORAL at 05:36

## 2023-01-01 RX ADMIN — DABIGATRAN ETEXILATE MESYLATE 150 MG: 150 CAPSULE ORAL at 21:33

## 2023-01-01 RX ADMIN — DEXAMETHASONE 4 MG: 4 TABLET ORAL at 23:31

## 2023-01-01 RX ADMIN — VANCOMYCIN HYDROCHLORIDE 125 MG: 125 CAPSULE ORAL at 08:01

## 2023-01-01 RX ADMIN — SULFAMETHOXAZOLE AND TRIMETHOPRIM 2 TABLET: 800; 160 TABLET ORAL at 21:14

## 2023-01-01 RX ADMIN — HYDROCHLOROTHIAZIDE 25 MG: 25 TABLET ORAL at 08:14

## 2023-01-01 RX ADMIN — DEXAMETHASONE 4 MG: 4 TABLET ORAL at 16:29

## 2023-01-01 RX ADMIN — PIPERACILLIN SODIUM AND TAZOBACTAM SODIUM 3.38 G: 36; 4.5 INJECTION, POWDER, LYOPHILIZED, FOR SOLUTION INTRAVENOUS at 01:59

## 2023-01-01 RX ADMIN — DEXTROSE AND SODIUM CHLORIDE 50 ML/HR: 5; .9 INJECTION, SOLUTION INTRAVENOUS at 10:31

## 2023-01-01 RX ADMIN — DEXTROSE AND SODIUM CHLORIDE 50 ML/HR: 5; .9 INJECTION, SOLUTION INTRAVENOUS at 09:34

## 2023-01-01 RX ADMIN — SULFAMETHOXAZOLE AND TRIMETHOPRIM 320 MG OF TRIMETHOPRIM: 80; 16 INJECTION, SOLUTION, CONCENTRATE INTRAVENOUS at 08:16

## 2023-01-01 RX ADMIN — PRAZOSIN HYDROCHLORIDE 1 MG: 1 CAPSULE ORAL at 21:19

## 2023-01-01 RX ADMIN — CEFEPIME HYDROCHLORIDE 2000 MG: 2 INJECTION, POWDER, FOR SOLUTION INTRAVENOUS at 02:25

## 2023-01-01 RX ADMIN — MEMANTINE 10 MG: 5 TABLET ORAL at 17:39

## 2023-01-01 RX ADMIN — CARBAMIDE PEROXIDE 6.5% 5 DROP: 6.5 LIQUID AURICULAR (OTIC) at 18:05

## 2023-01-01 RX ADMIN — PRAZOSIN HYDROCHLORIDE 1 MG: 1 CAPSULE ORAL at 21:33

## 2023-01-01 RX ADMIN — SODIUM BICARBONATE 650 MG TABLET 1300 MG: at 17:54

## 2023-01-01 RX ADMIN — SODIUM CHLORIDE 150 ML/HR: 0.9 INJECTION, SOLUTION INTRAVENOUS at 20:30

## 2023-01-01 RX ADMIN — SODIUM CHLORIDE 150 ML/HR: 0.9 INJECTION, SOLUTION INTRAVENOUS at 04:31

## 2023-01-01 RX ADMIN — DABIGATRAN ETEXILATE MESYLATE 150 MG: 150 CAPSULE ORAL at 20:21

## 2023-01-01 RX ADMIN — MEMANTINE 10 MG: 5 TABLET ORAL at 08:46

## 2023-01-01 RX ADMIN — DABIGATRAN ETEXILATE MESYLATE 150 MG: 150 CAPSULE ORAL at 10:04

## 2023-01-01 RX ADMIN — PANTOPRAZOLE SODIUM 40 MG: 40 TABLET, DELAYED RELEASE ORAL at 05:52

## 2023-01-01 RX ADMIN — METOPROLOL SUCCINATE 12.5 MG: 25 TABLET, EXTENDED RELEASE ORAL at 09:29

## 2023-01-01 RX ADMIN — IOHEXOL 100 ML: 350 INJECTION, SOLUTION INTRAVENOUS at 17:51

## 2023-01-01 RX ADMIN — DABIGATRAN ETEXILATE MESYLATE 150 MG: 150 CAPSULE ORAL at 10:41

## 2023-01-01 RX ADMIN — SULFAMETHOXAZOLE AND TRIMETHOPRIM 320 MG OF TRIMETHOPRIM: 80; 16 INJECTION, SOLUTION, CONCENTRATE INTRAVENOUS at 20:30

## 2023-01-01 RX ADMIN — PANTOPRAZOLE SODIUM 40 MG: 40 TABLET, DELAYED RELEASE ORAL at 05:19

## 2023-01-01 RX ADMIN — HYDROCHLOROTHIAZIDE 25 MG: 25 TABLET ORAL at 08:46

## 2023-01-01 RX ADMIN — DEXAMETHASONE 4 MG: 4 TABLET ORAL at 05:57

## 2023-01-01 RX ADMIN — DEXAMETHASONE 4 MG: 4 TABLET ORAL at 23:25

## 2023-01-01 RX ADMIN — DEXAMETHASONE SODIUM PHOSPHATE 2 MG: 4 INJECTION INTRA-ARTICULAR; INTRALESIONAL; INTRAMUSCULAR; INTRAVENOUS; SOFT TISSUE at 08:36

## 2023-01-01 RX ADMIN — PANTOPRAZOLE SODIUM 40 MG: 40 INJECTION, POWDER, FOR SOLUTION INTRAVENOUS at 09:00

## 2023-01-01 RX ADMIN — HYDROMORPHONE HYDROCHLORIDE 0.5 MG: 1 INJECTION, SOLUTION INTRAMUSCULAR; INTRAVENOUS; SUBCUTANEOUS at 04:29

## 2023-01-01 RX ADMIN — DABIGATRAN ETEXILATE MESYLATE 150 MG: 150 CAPSULE ORAL at 09:15

## 2023-01-01 RX ADMIN — HYDROCHLOROTHIAZIDE 25 MG: 25 TABLET ORAL at 10:04

## 2023-01-01 RX ADMIN — METOPROLOL SUCCINATE 12.5 MG: 25 TABLET, EXTENDED RELEASE ORAL at 08:42

## 2023-01-01 RX ADMIN — HYDROCHLOROTHIAZIDE 25 MG: 25 TABLET ORAL at 09:38

## 2023-01-01 RX ADMIN — IPRATROPIUM BROMIDE AND ALBUTEROL SULFATE 3 ML: 2.5; .5 SOLUTION RESPIRATORY (INHALATION) at 07:12

## 2023-01-01 RX ADMIN — DEXAMETHASONE 4 MG: 4 TABLET ORAL at 06:06

## 2023-01-01 RX ADMIN — CARBAMIDE PEROXIDE 6.5% 5 DROP: 6.5 LIQUID AURICULAR (OTIC) at 09:34

## 2023-01-01 RX ADMIN — MAGNESIUM SULFATE HEPTAHYDRATE 2 G: 40 INJECTION, SOLUTION INTRAVENOUS at 01:14

## 2023-01-01 RX ADMIN — PANTOPRAZOLE SODIUM 40 MG: 40 TABLET, DELAYED RELEASE ORAL at 05:11

## 2023-01-01 RX ADMIN — SULFAMETHOXAZOLE AND TRIMETHOPRIM 2 TABLET: 800; 160 TABLET ORAL at 21:15

## 2023-01-01 RX ADMIN — DEXAMETHASONE 4 MG: 4 TABLET ORAL at 21:57

## 2023-01-01 RX ADMIN — SULFAMETHOXAZOLE AND TRIMETHOPRIM 320 MG OF TRIMETHOPRIM: 80; 16 INJECTION, SOLUTION, CONCENTRATE INTRAVENOUS at 03:21

## 2023-01-01 RX ADMIN — VANCOMYCIN HYDROCHLORIDE 1500 MG: 1 INJECTION, POWDER, LYOPHILIZED, FOR SOLUTION INTRAVENOUS at 18:08

## 2023-01-01 RX ADMIN — CEFEPIME HYDROCHLORIDE 2000 MG: 2 INJECTION, POWDER, FOR SOLUTION INTRAVENOUS at 11:46

## 2023-01-01 RX ADMIN — VANCOMYCIN HYDROCHLORIDE 125 MG: 125 CAPSULE ORAL at 21:39

## 2023-01-01 RX ADMIN — DABIGATRAN ETEXILATE MESYLATE 150 MG: 150 CAPSULE ORAL at 09:29

## 2023-01-01 RX ADMIN — PANTOPRAZOLE SODIUM 40 MG: 40 TABLET, DELAYED RELEASE ORAL at 05:05

## 2023-01-01 RX ADMIN — ACETAMINOPHEN 325MG 650 MG: 325 TABLET ORAL at 21:30

## 2023-01-01 RX ADMIN — DEXAMETHASONE 4 MG: 4 TABLET ORAL at 05:06

## 2023-01-01 RX ADMIN — CARBAMIDE PEROXIDE 6.5% 5 DROP: 6.5 LIQUID AURICULAR (OTIC) at 09:29

## 2023-01-01 RX ADMIN — SIMETHICONE 160 MG: 80 TABLET, CHEWABLE ORAL at 12:39

## 2023-01-01 RX ADMIN — DEXAMETHASONE 4 MG: 4 TABLET ORAL at 09:29

## 2023-01-01 RX ADMIN — DEXAMETHASONE 4 MG: 4 TABLET ORAL at 21:23

## 2023-01-01 RX ADMIN — MEMANTINE 10 MG: 5 TABLET ORAL at 17:54

## 2023-01-01 RX ADMIN — DEXAMETHASONE 4 MG: 4 TABLET ORAL at 05:51

## 2023-01-01 RX ADMIN — MEMANTINE 10 MG: 5 TABLET ORAL at 17:19

## 2023-01-01 RX ADMIN — DEXAMETHASONE SODIUM PHOSPHATE 2 MG: 4 INJECTION INTRA-ARTICULAR; INTRALESIONAL; INTRAMUSCULAR; INTRAVENOUS; SOFT TISSUE at 10:00

## 2023-01-01 RX ADMIN — BINIMETINIB 45 MG: 15 TABLET, FILM COATED ORAL at 16:00

## 2023-01-01 RX ADMIN — PRAZOSIN HYDROCHLORIDE 1 MG: 1 CAPSULE ORAL at 21:57

## 2023-01-01 RX ADMIN — CARBAMIDE PEROXIDE 6.5% 5 DROP: 6.5 LIQUID AURICULAR (OTIC) at 08:37

## 2023-01-01 RX ADMIN — AZITHROMYCIN 500 MG: 500 INJECTION, POWDER, LYOPHILIZED, FOR SOLUTION INTRAVENOUS at 01:11

## 2023-01-01 RX ADMIN — PRAZOSIN HYDROCHLORIDE 1 MG: 1 CAPSULE ORAL at 21:24

## 2023-01-01 RX ADMIN — SULFAMETHOXAZOLE AND TRIMETHOPRIM 2 TABLET: 800; 160 TABLET ORAL at 02:07

## 2023-01-01 RX ADMIN — PIPERACILLIN SODIUM AND TAZOBACTAM SODIUM 3.38 G: 36; 4.5 INJECTION, POWDER, LYOPHILIZED, FOR SOLUTION INTRAVENOUS at 02:57

## 2023-01-01 RX ADMIN — DABIGATRAN ETEXILATE MESYLATE 150 MG: 150 CAPSULE ORAL at 21:57

## 2023-01-01 RX ADMIN — VANCOMYCIN HYDROCHLORIDE 750 MG: 750 INJECTION, SOLUTION INTRAVENOUS at 08:09

## 2023-01-01 RX ADMIN — SULFAMETHOXAZOLE AND TRIMETHOPRIM 320 MG OF TRIMETHOPRIM: 80; 16 INJECTION, SOLUTION, CONCENTRATE INTRAVENOUS at 12:33

## 2023-01-01 RX ADMIN — SULFAMETHOXAZOLE AND TRIMETHOPRIM 2 TABLET: 800; 160 TABLET ORAL at 10:05

## 2023-01-01 RX ADMIN — METOPROLOL SUCCINATE 12.5 MG: 25 TABLET, EXTENDED RELEASE ORAL at 08:01

## 2023-01-01 RX ADMIN — DEXAMETHASONE 4 MG: 4 TABLET ORAL at 22:12

## 2023-01-01 RX ADMIN — PIPERACILLIN SODIUM AND TAZOBACTAM SODIUM 3.38 G: 36; 4.5 INJECTION, POWDER, LYOPHILIZED, FOR SOLUTION INTRAVENOUS at 20:34

## 2023-01-01 RX ADMIN — METOROPROLOL TARTRATE 2.5 MG: 5 INJECTION, SOLUTION INTRAVENOUS at 05:30

## 2023-01-01 RX ADMIN — MEMANTINE 10 MG: 5 TABLET ORAL at 08:07

## 2023-01-01 RX ADMIN — METOPROLOL SUCCINATE 12.5 MG: 25 TABLET, EXTENDED RELEASE ORAL at 08:46

## 2023-01-01 RX ADMIN — DEXAMETHASONE SODIUM PHOSPHATE 2 MG: 4 INJECTION INTRA-ARTICULAR; INTRALESIONAL; INTRAMUSCULAR; INTRAVENOUS; SOFT TISSUE at 22:11

## 2023-01-01 RX ADMIN — ALBUTEROL SULFATE 2 PUFF: 90 AEROSOL, METERED RESPIRATORY (INHALATION) at 13:46

## 2023-01-01 RX ADMIN — METOROPROLOL TARTRATE 5 MG: 5 INJECTION, SOLUTION INTRAVENOUS at 05:43

## 2023-01-01 RX ADMIN — MEMANTINE 10 MG: 5 TABLET ORAL at 08:08

## 2023-01-01 RX ADMIN — SULFAMETHOXAZOLE AND TRIMETHOPRIM 2 TABLET: 800; 160 TABLET ORAL at 01:31

## 2023-01-01 RX ADMIN — DEXAMETHASONE 4 MG: 4 TABLET ORAL at 14:34

## 2023-01-01 RX ADMIN — DABIGATRAN ETEXILATE MESYLATE 150 MG: 150 CAPSULE ORAL at 21:15

## 2023-01-01 RX ADMIN — DABIGATRAN ETEXILATE MESYLATE 150 MG: 150 CAPSULE ORAL at 08:08

## 2023-01-01 RX ADMIN — MEMANTINE 10 MG: 5 TABLET ORAL at 10:41

## 2023-01-01 RX ADMIN — DEXAMETHASONE 4 MG: 4 TABLET ORAL at 09:59

## 2023-01-01 RX ADMIN — DABIGATRAN ETEXILATE MESYLATE 150 MG: 150 CAPSULE ORAL at 08:41

## 2023-01-01 RX ADMIN — POTASSIUM CHLORIDE 40 MEQ: 1500 TABLET, EXTENDED RELEASE ORAL at 07:57

## 2023-01-01 RX ADMIN — CARBAMIDE PEROXIDE 6.5% 5 DROP: 6.5 LIQUID AURICULAR (OTIC) at 18:09

## 2023-01-01 RX ADMIN — DABIGATRAN ETEXILATE MESYLATE 150 MG: 150 CAPSULE ORAL at 09:35

## 2023-01-01 RX ADMIN — SODIUM CHLORIDE, SODIUM GLUCONATE, SODIUM ACETATE, POTASSIUM CHLORIDE, MAGNESIUM CHLORIDE, SODIUM PHOSPHATE, DIBASIC, AND POTASSIUM PHOSPHATE 500 ML: .53; .5; .37; .037; .03; .012; .00082 INJECTION, SOLUTION INTRAVENOUS at 21:31

## 2023-01-01 RX ADMIN — DEXAMETHASONE 4 MG: 4 TABLET ORAL at 21:59

## 2023-01-01 RX ADMIN — SODIUM CHLORIDE 1 G: 1 TABLET ORAL at 13:00

## 2023-01-01 RX ADMIN — MEMANTINE 10 MG: 5 TABLET ORAL at 18:04

## 2023-01-01 RX ADMIN — MEMANTINE 10 MG: 5 TABLET ORAL at 09:29

## 2023-01-01 RX ADMIN — VANCOMYCIN HYDROCHLORIDE 125 MG: 125 CAPSULE ORAL at 21:20

## 2023-01-01 RX ADMIN — CARBAMIDE PEROXIDE 6.5% 5 DROP: 6.5 LIQUID AURICULAR (OTIC) at 17:24

## 2023-01-01 RX ADMIN — GADOBUTROL 8 ML: 604.72 INJECTION INTRAVENOUS at 17:33

## 2023-01-01 RX ADMIN — PIPERACILLIN SODIUM AND TAZOBACTAM SODIUM 3.38 G: 36; 4.5 INJECTION, POWDER, LYOPHILIZED, FOR SOLUTION INTRAVENOUS at 20:31

## 2023-01-01 RX ADMIN — DABIGATRAN ETEXILATE MESYLATE 150 MG: 150 CAPSULE ORAL at 22:12

## 2023-01-01 RX ADMIN — SULFAMETHOXAZOLE AND TRIMETHOPRIM 2 TABLET: 800; 160 TABLET ORAL at 16:29

## 2023-01-01 RX ADMIN — SULFAMETHOXAZOLE AND TRIMETHOPRIM 380 MG OF TRIMETHOPRIM: 80; 16 INJECTION, SOLUTION, CONCENTRATE INTRAVENOUS at 07:57

## 2023-01-01 RX ADMIN — IPRATROPIUM BROMIDE 0.5 MG: 0.5 SOLUTION RESPIRATORY (INHALATION) at 22:01

## 2023-01-01 RX ADMIN — SULFAMETHOXAZOLE AND TRIMETHOPRIM 320 MG OF TRIMETHOPRIM: 80; 16 INJECTION, SOLUTION, CONCENTRATE INTRAVENOUS at 15:11

## 2023-01-01 RX ADMIN — VANCOMYCIN HYDROCHLORIDE 125 MG: 125 CAPSULE ORAL at 09:45

## 2023-01-01 RX ADMIN — ACETAMINOPHEN 325MG 650 MG: 325 TABLET ORAL at 21:25

## 2023-01-01 RX ADMIN — PANTOPRAZOLE SODIUM 40 MG: 40 TABLET, DELAYED RELEASE ORAL at 05:20

## 2023-01-01 RX ADMIN — DEXAMETHASONE SODIUM PHOSPHATE 2 MG: 4 INJECTION INTRA-ARTICULAR; INTRALESIONAL; INTRAMUSCULAR; INTRAVENOUS; SOFT TISSUE at 09:00

## 2023-01-01 RX ADMIN — DABIGATRAN ETEXILATE MESYLATE 150 MG: 150 CAPSULE ORAL at 21:14

## 2023-01-01 RX ADMIN — COSYNTROPIN 0.25 MG: 0.25 INJECTION, POWDER, LYOPHILIZED, FOR SOLUTION INTRAVENOUS at 09:18

## 2023-01-01 RX ADMIN — PIPERACILLIN SODIUM AND TAZOBACTAM SODIUM 3.38 G: 36; 4.5 INJECTION, POWDER, LYOPHILIZED, FOR SOLUTION INTRAVENOUS at 10:17

## 2023-01-01 RX ADMIN — DABIGATRAN ETEXILATE MESYLATE 150 MG: 150 CAPSULE ORAL at 21:51

## 2023-01-01 RX ADMIN — PANTOPRAZOLE SODIUM 40 MG: 40 TABLET, DELAYED RELEASE ORAL at 06:05

## 2023-01-01 RX ADMIN — CARBAMIDE PEROXIDE 6.5% 5 DROP: 6.5 LIQUID AURICULAR (OTIC) at 17:12

## 2023-01-01 RX ADMIN — METOPROLOL SUCCINATE 12.5 MG: 25 TABLET, EXTENDED RELEASE ORAL at 09:15

## 2023-01-01 RX ADMIN — SULFAMETHOXAZOLE AND TRIMETHOPRIM 2 TABLET: 800; 160 TABLET ORAL at 13:16

## 2023-01-01 RX ADMIN — SULFAMETHOXAZOLE AND TRIMETHOPRIM 2 TABLET: 800; 160 TABLET ORAL at 13:31

## 2023-01-01 RX ADMIN — DABIGATRAN ETEXILATE MESYLATE 150 MG: 150 CAPSULE ORAL at 21:22

## 2023-01-01 RX ADMIN — PRAZOSIN HYDROCHLORIDE 1 MG: 1 CAPSULE ORAL at 21:23

## 2023-01-01 RX ADMIN — DEXAMETHASONE 4 MG: 4 TABLET ORAL at 05:59

## 2023-01-01 RX ADMIN — SODIUM CHLORIDE 150 ML/HR: 0.9 INJECTION, SOLUTION INTRAVENOUS at 15:07

## 2023-01-01 RX ADMIN — DABIGATRAN ETEXILATE MESYLATE 150 MG: 150 CAPSULE ORAL at 08:06

## 2023-01-01 RX ADMIN — MAGNESIUM SULFATE HEPTAHYDRATE 2 G: 40 INJECTION, SOLUTION INTRAVENOUS at 05:30

## 2023-01-01 RX ADMIN — PRAZOSIN HYDROCHLORIDE 1 MG: 1 CAPSULE ORAL at 22:12

## 2023-01-01 RX ADMIN — CARBAMIDE PEROXIDE 6.5% 5 DROP: 6.5 LIQUID AURICULAR (OTIC) at 17:28

## 2023-01-01 RX ADMIN — MEMANTINE 10 MG: 5 TABLET ORAL at 09:59

## 2023-01-01 RX ADMIN — DEXAMETHASONE 4 MG: 4 TABLET ORAL at 21:51

## 2023-01-01 RX ADMIN — ALBUTEROL SULFATE 5 MG: 2.5 SOLUTION RESPIRATORY (INHALATION) at 22:01

## 2023-01-01 RX ADMIN — DEXAMETHASONE 4 MG: 4 TABLET ORAL at 21:47

## 2023-01-01 RX ADMIN — CEFEPIME HYDROCHLORIDE 2000 MG: 2 INJECTION, POWDER, FOR SOLUTION INTRAVENOUS at 03:18

## 2023-01-01 RX ADMIN — DEXAMETHASONE 4 MG: 4 TABLET ORAL at 20:21

## 2023-01-01 RX ADMIN — DABIGATRAN ETEXILATE MESYLATE 150 MG: 150 CAPSULE ORAL at 21:47

## 2023-01-01 RX ADMIN — MEMANTINE 10 MG: 5 TABLET ORAL at 18:03

## 2023-01-01 RX ADMIN — MEMANTINE 10 MG: 5 TABLET ORAL at 10:00

## 2023-01-01 RX ADMIN — MEMANTINE 10 MG: 5 TABLET ORAL at 09:38

## 2023-01-01 RX ADMIN — SODIUM BICARBONATE 650 MG TABLET 650 MG: at 17:28

## 2023-01-01 RX ADMIN — SULFAMETHOXAZOLE AND TRIMETHOPRIM 2 TABLET: 800; 160 TABLET ORAL at 02:14

## 2023-01-01 RX ADMIN — CARBAMIDE PEROXIDE 6.5% 5 DROP: 6.5 LIQUID AURICULAR (OTIC) at 08:07

## 2023-01-01 RX ADMIN — SODIUM BICARBONATE 650 MG TABLET 650 MG: at 09:58

## 2023-01-01 RX ADMIN — CEFEPIME HYDROCHLORIDE 2000 MG: 2 INJECTION, POWDER, FOR SOLUTION INTRAVENOUS at 04:45

## 2023-01-01 RX ADMIN — AZITHROMYCIN 500 MG: 500 INJECTION, POWDER, LYOPHILIZED, FOR SOLUTION INTRAVENOUS at 00:21

## 2023-01-01 RX ADMIN — MEMANTINE 10 MG: 5 TABLET ORAL at 09:51

## 2023-01-01 RX ADMIN — PANTOPRAZOLE SODIUM 40 MG: 40 TABLET, DELAYED RELEASE ORAL at 05:49

## 2023-01-01 RX ADMIN — CARBAMIDE PEROXIDE 6.5% 5 DROP: 6.5 LIQUID AURICULAR (OTIC) at 17:49

## 2023-01-01 RX ADMIN — CARBAMIDE PEROXIDE 6.5% 5 DROP: 6.5 LIQUID AURICULAR (OTIC) at 08:19

## 2023-01-01 RX ADMIN — SODIUM CHLORIDE 150 ML/HR: 0.9 INJECTION, SOLUTION INTRAVENOUS at 19:10

## 2023-01-01 RX ADMIN — METOPROLOL SUCCINATE 12.5 MG: 25 TABLET, EXTENDED RELEASE ORAL at 08:09

## 2023-01-01 RX ADMIN — SULFAMETHOXAZOLE AND TRIMETHOPRIM 2 TABLET: 800; 160 TABLET ORAL at 08:41

## 2023-01-01 RX ADMIN — DEXAMETHASONE 4 MG: 4 TABLET ORAL at 14:16

## 2023-01-01 RX ADMIN — CEFEPIME HYDROCHLORIDE 2000 MG: 2 INJECTION, POWDER, FOR SOLUTION INTRAVENOUS at 19:27

## 2023-01-01 RX ADMIN — DEXAMETHASONE 4 MG: 4 TABLET ORAL at 15:10

## 2023-01-01 RX ADMIN — DEXAMETHASONE 4 MG: 4 TABLET ORAL at 09:16

## 2023-01-01 RX ADMIN — SULFAMETHOXAZOLE AND TRIMETHOPRIM 2 TABLET: 800; 160 TABLET ORAL at 08:06

## 2023-01-01 RX ADMIN — SULFAMETHOXAZOLE AND TRIMETHOPRIM 380 MG OF TRIMETHOPRIM: 80; 16 INJECTION, SOLUTION, CONCENTRATE INTRAVENOUS at 00:58

## 2023-01-01 RX ADMIN — CEFTRIAXONE SODIUM 1000 MG: 10 INJECTION, POWDER, FOR SOLUTION INTRAVENOUS at 00:28

## 2023-01-01 RX ADMIN — PIPERACILLIN SODIUM AND TAZOBACTAM SODIUM 3.38 G: 36; 4.5 INJECTION, POWDER, LYOPHILIZED, FOR SOLUTION INTRAVENOUS at 09:00

## 2023-01-01 RX ADMIN — DEXAMETHASONE 4 MG: 4 TABLET ORAL at 05:49

## 2023-01-01 RX ADMIN — DABIGATRAN ETEXILATE MESYLATE 150 MG: 150 CAPSULE ORAL at 10:00

## 2023-01-01 RX ADMIN — PIPERACILLIN SODIUM AND TAZOBACTAM SODIUM 3.38 G: 36; 4.5 INJECTION, POWDER, LYOPHILIZED, FOR SOLUTION INTRAVENOUS at 02:25

## 2023-01-01 RX ADMIN — SODIUM CHLORIDE 150 ML/HR: 0.9 INJECTION, SOLUTION INTRAVENOUS at 04:27

## 2023-01-01 RX ADMIN — DEXAMETHASONE SODIUM PHOSPHATE 2 MG: 4 INJECTION INTRA-ARTICULAR; INTRALESIONAL; INTRAMUSCULAR; INTRAVENOUS; SOFT TISSUE at 20:30

## 2023-01-01 RX ADMIN — DABIGATRAN ETEXILATE MESYLATE 150 MG: 150 CAPSULE ORAL at 23:25

## 2023-01-01 RX ADMIN — MEMANTINE 10 MG: 5 TABLET ORAL at 17:28

## 2023-01-01 RX ADMIN — CARBAMIDE PEROXIDE 6.5% 5 DROP: 6.5 LIQUID AURICULAR (OTIC) at 17:52

## 2023-01-01 RX ADMIN — DABIGATRAN ETEXILATE MESYLATE 150 MG: 150 CAPSULE ORAL at 09:51

## 2023-01-01 RX ADMIN — HYDROCHLOROTHIAZIDE 25 MG: 25 TABLET ORAL at 08:08

## 2023-01-01 RX ADMIN — SODIUM BICARBONATE 650 MG TABLET 1300 MG: at 12:39

## 2023-01-01 RX ADMIN — POTASSIUM CHLORIDE 40 MEQ: 1500 TABLET, EXTENDED RELEASE ORAL at 23:25

## 2023-01-01 RX ADMIN — MEMANTINE 10 MG: 5 TABLET ORAL at 17:52

## 2023-01-01 RX ADMIN — MEMANTINE 10 MG: 5 TABLET ORAL at 19:39

## 2023-01-01 RX ADMIN — PIPERACILLIN SODIUM AND TAZOBACTAM SODIUM 3.38 G: 36; 4.5 INJECTION, POWDER, LYOPHILIZED, FOR SOLUTION INTRAVENOUS at 22:11

## 2023-01-01 RX ADMIN — HYDROMORPHONE HYDROCHLORIDE 0.5 MG: 1 INJECTION, SOLUTION INTRAMUSCULAR; INTRAVENOUS; SUBCUTANEOUS at 20:30

## 2023-01-01 RX ADMIN — MEMANTINE 10 MG: 5 TABLET ORAL at 18:10

## 2023-01-01 RX ADMIN — SULFAMETHOXAZOLE AND TRIMETHOPRIM 2 TABLET: 800; 160 TABLET ORAL at 09:38

## 2023-01-01 RX ADMIN — SULFAMETHOXAZOLE AND TRIMETHOPRIM 2 TABLET: 800; 160 TABLET ORAL at 21:43

## 2023-01-01 RX ADMIN — PANTOPRAZOLE SODIUM 40 MG: 40 TABLET, DELAYED RELEASE ORAL at 05:25

## 2023-01-01 RX ADMIN — SULFAMETHOXAZOLE AND TRIMETHOPRIM 2 TABLET: 800; 160 TABLET ORAL at 14:54

## 2023-01-01 RX ADMIN — METOROPROLOL TARTRATE 2.5 MG: 5 INJECTION, SOLUTION INTRAVENOUS at 05:39

## 2023-01-01 RX ADMIN — CEFEPIME HYDROCHLORIDE 2000 MG: 2 INJECTION, POWDER, FOR SOLUTION INTRAVENOUS at 18:03

## 2023-01-01 RX ADMIN — MEMANTINE 10 MG: 5 TABLET ORAL at 09:34

## 2023-01-01 RX ADMIN — DABIGATRAN ETEXILATE MESYLATE 150 MG: 150 CAPSULE ORAL at 21:39

## 2023-01-01 RX ADMIN — SULFAMETHOXAZOLE AND TRIMETHOPRIM 320 MG OF TRIMETHOPRIM: 80; 16 INJECTION, SOLUTION, CONCENTRATE INTRAVENOUS at 04:31

## 2023-01-01 RX ADMIN — DEXAMETHASONE 4 MG: 4 TABLET ORAL at 14:05

## 2023-01-01 RX ADMIN — SULFAMETHOXAZOLE AND TRIMETHOPRIM 2 TABLET: 800; 160 TABLET ORAL at 14:24

## 2023-01-01 RX ADMIN — PRAZOSIN HYDROCHLORIDE 1 MG: 1 CAPSULE ORAL at 21:39

## 2023-01-01 RX ADMIN — MEMANTINE 10 MG: 5 TABLET ORAL at 19:27

## 2023-01-01 RX ADMIN — MEMANTINE 10 MG: 5 TABLET ORAL at 09:16

## 2023-01-01 RX ADMIN — DABIGATRAN ETEXILATE MESYLATE 150 MG: 150 CAPSULE ORAL at 08:16

## 2023-01-01 RX ADMIN — LORAZEPAM 1 MG: 2 INJECTION INTRAMUSCULAR; INTRAVENOUS at 16:56

## 2023-01-01 RX ADMIN — HYDROCHLOROTHIAZIDE 25 MG: 25 TABLET ORAL at 08:06

## 2023-01-01 RX ADMIN — PIPERACILLIN SODIUM AND TAZOBACTAM SODIUM 3.38 G: 36; 4.5 INJECTION, POWDER, LYOPHILIZED, FOR SOLUTION INTRAVENOUS at 13:00

## 2023-01-01 RX ADMIN — PANTOPRAZOLE SODIUM 40 MG: 40 INJECTION, POWDER, FOR SOLUTION INTRAVENOUS at 10:00

## 2023-01-01 RX ADMIN — SULFAMETHOXAZOLE AND TRIMETHOPRIM 2 TABLET: 800; 160 TABLET ORAL at 13:45

## 2023-01-01 RX ADMIN — CARBAMIDE PEROXIDE 6.5% 5 DROP: 6.5 LIQUID AURICULAR (OTIC) at 16:01

## 2023-01-01 RX ADMIN — SULFAMETHOXAZOLE AND TRIMETHOPRIM 2 TABLET: 800; 160 TABLET ORAL at 03:15

## 2023-01-01 RX ADMIN — OXYCODONE HYDROCHLORIDE 5 MG: 5 SOLUTION ORAL at 10:47

## 2023-01-01 RX ADMIN — CEFEPIME HYDROCHLORIDE 2000 MG: 2 INJECTION, POWDER, FOR SOLUTION INTRAVENOUS at 17:16

## 2023-01-01 RX ADMIN — SULFAMETHOXAZOLE AND TRIMETHOPRIM 2 TABLET: 800; 160 TABLET ORAL at 14:15

## 2023-01-01 RX ADMIN — SULFAMETHOXAZOLE AND TRIMETHOPRIM 2 TABLET: 800; 160 TABLET ORAL at 05:36

## 2023-01-01 RX ADMIN — VANCOMYCIN HYDROCHLORIDE 125 MG: 125 CAPSULE ORAL at 21:51

## 2023-01-01 RX ADMIN — DEXAMETHASONE 4 MG: 4 TABLET ORAL at 09:35

## 2023-01-01 RX ADMIN — PANTOPRAZOLE SODIUM 40 MG: 40 INJECTION, POWDER, FOR SOLUTION INTRAVENOUS at 08:37

## 2023-01-01 RX ADMIN — DABIGATRAN ETEXILATE MESYLATE 150 MG: 150 CAPSULE ORAL at 21:23

## 2023-01-01 RX ADMIN — VANCOMYCIN HYDROCHLORIDE 125 MG: 125 CAPSULE ORAL at 21:15

## 2023-01-01 RX ADMIN — SULFAMETHOXAZOLE AND TRIMETHOPRIM 2 TABLET: 800; 160 TABLET ORAL at 21:57

## 2023-01-01 RX ADMIN — SULFAMETHOXAZOLE AND TRIMETHOPRIM 2 TABLET: 800; 160 TABLET ORAL at 13:00

## 2023-01-01 RX ADMIN — SULFAMETHOXAZOLE AND TRIMETHOPRIM 2 TABLET: 800; 160 TABLET ORAL at 21:23

## 2023-01-01 RX ADMIN — SODIUM CHLORIDE, SODIUM GLUCONATE, SODIUM ACETATE, POTASSIUM CHLORIDE, MAGNESIUM CHLORIDE, SODIUM PHOSPHATE, DIBASIC, AND POTASSIUM PHOSPHATE 1000 ML: .53; .5; .37; .037; .03; .012; .00082 INJECTION, SOLUTION INTRAVENOUS at 15:51

## 2023-01-01 RX ADMIN — SIMETHICONE 80 MG: 80 TABLET, CHEWABLE ORAL at 13:08

## 2023-01-01 RX ADMIN — SULFAMETHOXAZOLE AND TRIMETHOPRIM 320 MG OF TRIMETHOPRIM: 80; 16 INJECTION, SOLUTION, CONCENTRATE INTRAVENOUS at 22:11

## 2023-01-01 RX ADMIN — PIPERACILLIN SODIUM AND TAZOBACTAM SODIUM 3.38 G: 36; 4.5 INJECTION, POWDER, LYOPHILIZED, FOR SOLUTION INTRAVENOUS at 14:51

## 2023-01-01 RX ADMIN — MEMANTINE 10 MG: 5 TABLET ORAL at 08:41

## 2023-01-01 RX ADMIN — SULFAMETHOXAZOLE AND TRIMETHOPRIM 2 TABLET: 800; 160 TABLET ORAL at 06:01

## 2023-01-01 RX ADMIN — DEXAMETHASONE 4 MG: 4 TABLET ORAL at 06:07

## 2023-01-01 RX ADMIN — CARBAMIDE PEROXIDE 6.5% 5 DROP: 6.5 LIQUID AURICULAR (OTIC) at 10:05

## 2023-01-01 RX ADMIN — SULFAMETHOXAZOLE AND TRIMETHOPRIM 2 TABLET: 800; 160 TABLET ORAL at 21:24

## 2023-01-01 RX ADMIN — CARBAMIDE PEROXIDE 6.5% 5 DROP: 6.5 LIQUID AURICULAR (OTIC) at 09:41

## 2023-01-01 RX ADMIN — OXYCODONE HYDROCHLORIDE 5 MG: 5 SOLUTION ORAL at 04:41

## 2023-01-01 RX ADMIN — METOPROLOL SUCCINATE 12.5 MG: 25 TABLET, EXTENDED RELEASE ORAL at 09:36

## 2023-01-01 RX ADMIN — DABIGATRAN ETEXILATE MESYLATE 150 MG: 150 CAPSULE ORAL at 09:38

## 2023-01-01 RX ADMIN — CARBAMIDE PEROXIDE 6.5% 5 DROP: 6.5 LIQUID AURICULAR (OTIC) at 09:51

## 2023-01-01 RX ADMIN — CARBAMIDE PEROXIDE 6.5% 5 DROP: 6.5 LIQUID AURICULAR (OTIC) at 08:46

## 2023-01-01 RX ADMIN — DEXAMETHASONE 4 MG: 4 TABLET ORAL at 06:01

## 2023-01-01 RX ADMIN — METOPROLOL SUCCINATE 12.5 MG: 25 TABLET, EXTENDED RELEASE ORAL at 08:06

## 2023-01-01 RX ADMIN — METOPROLOL SUCCINATE 12.5 MG: 25 TABLET, EXTENDED RELEASE ORAL at 10:05

## 2023-01-01 RX ADMIN — SODIUM CHLORIDE 150 ML/HR: 0.9 INJECTION, SOLUTION INTRAVENOUS at 20:24

## 2023-01-01 RX ADMIN — DABIGATRAN ETEXILATE MESYLATE 150 MG: 150 CAPSULE ORAL at 21:55

## 2023-01-01 RX ADMIN — SIMETHICONE 80 MG: 80 TABLET, CHEWABLE ORAL at 17:39

## 2023-01-01 RX ADMIN — PANTOPRAZOLE SODIUM 40 MG: 40 TABLET, DELAYED RELEASE ORAL at 05:59

## 2023-01-01 RX ADMIN — METOPROLOL SUCCINATE 12.5 MG: 25 TABLET, EXTENDED RELEASE ORAL at 09:59

## 2023-01-01 RX ADMIN — PANTOPRAZOLE SODIUM 40 MG: 40 TABLET, DELAYED RELEASE ORAL at 06:07

## 2023-01-01 RX ADMIN — DEXAMETHASONE 4 MG: 4 TABLET ORAL at 16:09

## 2023-01-01 RX ADMIN — MEMANTINE 10 MG: 5 TABLET ORAL at 10:05

## 2023-01-01 RX ADMIN — SULFAMETHOXAZOLE AND TRIMETHOPRIM 2 TABLET: 800; 160 TABLET ORAL at 21:00

## 2023-01-01 RX ADMIN — DABIGATRAN ETEXILATE MESYLATE 150 MG: 150 CAPSULE ORAL at 09:58

## 2023-01-01 RX ADMIN — DEXAMETHASONE 4 MG: 4 TABLET ORAL at 21:45

## 2023-01-01 RX ADMIN — SULFAMETHOXAZOLE AND TRIMETHOPRIM 2 TABLET: 800; 160 TABLET ORAL at 21:22

## 2023-01-01 RX ADMIN — CARBAMIDE PEROXIDE 6.5% 5 DROP: 6.5 LIQUID AURICULAR (OTIC) at 09:16

## 2023-01-01 RX ADMIN — METOPROLOL SUCCINATE 12.5 MG: 25 TABLET, EXTENDED RELEASE ORAL at 09:50

## 2023-01-01 RX ADMIN — CARBAMIDE PEROXIDE 6.5% 5 DROP: 6.5 LIQUID AURICULAR (OTIC) at 18:24

## 2023-01-01 RX ADMIN — CARBAMIDE PEROXIDE 6.5% 5 DROP: 6.5 LIQUID AURICULAR (OTIC) at 17:39

## 2023-01-01 RX ADMIN — SULFAMETHOXAZOLE AND TRIMETHOPRIM 2 TABLET: 800; 160 TABLET ORAL at 16:10

## 2023-01-01 RX ADMIN — SULFAMETHOXAZOLE AND TRIMETHOPRIM 2 TABLET: 800; 160 TABLET ORAL at 05:25

## 2023-01-01 RX ADMIN — MEMANTINE 10 MG: 5 TABLET ORAL at 09:41

## 2023-01-01 RX ADMIN — SULFAMETHOXAZOLE AND TRIMETHOPRIM 2 TABLET: 800; 160 TABLET ORAL at 09:49

## 2023-01-01 RX ADMIN — SODIUM BICARBONATE 650 MG TABLET 650 MG: at 13:07

## 2023-01-01 RX ADMIN — ENCORAFENIB 450 MG: 75 CAPSULE ORAL at 16:00

## 2023-01-01 RX ADMIN — CARBAMIDE PEROXIDE 6.5% 5 DROP: 6.5 LIQUID AURICULAR (OTIC) at 18:01

## 2023-01-01 RX ADMIN — MEMANTINE 10 MG: 5 TABLET ORAL at 17:11

## 2023-01-01 RX ADMIN — SULFAMETHOXAZOLE AND TRIMETHOPRIM 2 TABLET: 800; 160 TABLET ORAL at 13:11

## 2023-01-09 ENCOUNTER — APPOINTMENT (OUTPATIENT)
Dept: LAB | Facility: MEDICAL CENTER | Age: 68
End: 2023-01-09

## 2023-01-24 ENCOUNTER — APPOINTMENT (OUTPATIENT)
Dept: LAB | Facility: MEDICAL CENTER | Age: 68
End: 2023-01-24

## 2023-02-07 ENCOUNTER — VBI (OUTPATIENT)
Dept: ADMINISTRATIVE | Facility: OTHER | Age: 68
End: 2023-02-07

## 2023-02-09 PROBLEM — Z85.820 PERSONAL HISTORY OF MALIGNANT MELANOMA: Status: ACTIVE | Noted: 2022-07-01

## 2023-02-09 PROBLEM — Z85.820 ENCOUNTER FOR FOLLOW-UP SURVEILLANCE OF MELANOMA: Status: ACTIVE | Noted: 2023-02-09

## 2023-02-09 PROBLEM — Z08 ENCOUNTER FOR FOLLOW-UP SURVEILLANCE OF MELANOMA: Status: ACTIVE | Noted: 2023-02-09

## 2023-02-14 ENCOUNTER — OFFICE VISIT (OUTPATIENT)
Dept: SURGICAL ONCOLOGY | Facility: CLINIC | Age: 68
End: 2023-02-14

## 2023-02-14 VITALS
DIASTOLIC BLOOD PRESSURE: 80 MMHG | HEIGHT: 68 IN | OXYGEN SATURATION: 97 % | SYSTOLIC BLOOD PRESSURE: 130 MMHG | HEART RATE: 93 BPM | WEIGHT: 199 LBS | BODY MASS INDEX: 30.16 KG/M2 | RESPIRATION RATE: 17 BRPM

## 2023-02-14 DIAGNOSIS — Z85.820 ENCOUNTER FOR FOLLOW-UP SURVEILLANCE OF MELANOMA: Primary | ICD-10-CM

## 2023-02-14 DIAGNOSIS — Z08 ENCOUNTER FOR FOLLOW-UP SURVEILLANCE OF MELANOMA: Primary | ICD-10-CM

## 2023-02-14 DIAGNOSIS — Z85.820 PERSONAL HISTORY OF MALIGNANT MELANOMA: ICD-10-CM

## 2023-02-14 NOTE — PROGRESS NOTES
Surgical Oncology Follow Up    [id number] [facility] Rossy Tapia [place]  [facility] [address]  [address] Starleen Half  1955  [id number]    Diagnoses and all orders for this visit:    Encounter for follow-up surveillance of melanoma    Personal history of malignant melanoma        Chief Complaint   Patient presents with   • Follow-up       No follow-ups on file  Oncology History   Personal history of malignant melanoma   5/31/2022 Biopsy    Right Leg, Shave Biopsy   Melanoma extending to the deep specimen margin  Thickness: 7 0mm  Ulceration: not seen   Mitoses: 3      5/31/2022 -  Cancer Staged    Staging form: Melanoma of the Skin, AJCC 8th Edition  - Clinical stage from 5/31/2022: Stage IIB (cT4a, cN0, cM0) - Signed by Maryse Johnson MD on 8/25/2022 7/1/2022 Initial Diagnosis    Malignant melanoma of leg, right ([address] )     7/29/2022 Surgery    A  Lymph node, sentinel, #1:  One lymph node with rare metastatic melanoma cells (1/1), subcapsular location  Immunohistochemical study for MART-1, HMB45 and S100 supports the findings  B  Leg, right, knee, wide excision:  Residual melanoma, nodular type, and scar; margins free  See synoptic report  7/29/2022 -  Cancer Staged    Staging form: Melanoma of the Skin, AJCC 8th Edition  - Pathologic stage from 7/29/2022: Stage IIIC (pT4a, pN1a, cM0) - Signed by Maryse Johnson MD on 8/25/2022           Staging: T4aN1a fiona of RT knee 7/2022  Treatment history: obs  Current treatment: obs  Disease status: obs    History of Present Illness:  Patient is here in f/u  Attempted adjuvant treatment but was unable to tolerate side effects  Recurrent seroma with long-term indwelling drain d/c'ed in 11/2022  Doing well today  No lumps or bumps to report  Has been diligent about f/u with derm and recently had MOHS for SCC       Review of Systems  Complete ROS Surg Onc:   Constitutional: The patient denies new or recent history of general fatigue, no recent weight loss, no change in appetite  Eyes: No complaints of visual problems, no scleral icterus  ENT: no complaints of ear pain, no hoarseness, no difficulty swallowing,  no tinnitus and no new masses in head, oral cavity, or neck  Cardiovascular: No complaints of chest pain, no palpitations, no ankle edema  Respiratory: No complaints of shortness of breath, no cough  Gastrointestinal: No complaints of jaundice, no bloody stools, no pale stools  Genitourinary: No complaints of dysuria, no hematuria, no nocturia, no frequent urination, no urethral discharge  Musculoskeletal: No complaints of weakness, paralysis, joint stiffness or arthralgias  Integumentary: No complaints of rash, no new lesions  Neurological: No complaints of convulsions, no seizures, no dizziness  Hematologic/Lymphatic: No complaints of easy bruising  Endocrine:  No hot or cold intolerance  No polydipsia, polyphagia, or polyuria  Allergy/immunology:  No environmental allergies  No food allergies  Not immunocompromised  Skin:  No pallor or rash  No wound          Patient Active Problem List   Diagnosis   • 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   • Hyperbilirubinemia   • Symptomatic anemia   • Pulmonary embolism with acute cor pulmonale (HCC)   • Portal vein thrombosis   • Elevated blood pressure reading without diagnosis of hypertension   • Shortness of breath   • Gastroesophageal reflux disease   • Autoimmune hemolytic anemia   • ARABELLA (acute kidney injury) ([facility] [address] )   • Splenomegaly   • Iron overload due to repeated red blood cell transfusions   • Posttraumatic stress disorder   • Essential hypertension   • Glucose intolerance (impaired glucose tolerance)   • Renal insufficiency   • Auto immune neutropenia (HCC)   • Primary osteoarthritis of left knee   • Pain in joint, lower leg   • Pain in left knee   • COVID-19   • Thrombocytosis   • Primary localized osteoarthrosis of the knee, right   • Hyperglycemia   • Chronic bilateral low back pain with bilateral sciatica   • Hypercholesterolemia   • Personal history of malignant melanoma   • Dyspnea   • Acute respiratory failure with hypoxia (HCC)   • Elevated serum creatinine   • Elevated bilirubin   • Leukocytosis   • Lymphocele after surgical procedure   • Encounter for follow-up surveillance of melanoma     Past Medical History:   Diagnosis Date   • Anemia 11/02/2016   • Anxiety    • Arthritis    • Autoimmune hemolytic anemia ([facility] [address] )    • Claustrophobia    • COVID 12/2020   • DVT (deep venous thrombosis) (HCC)    • GERD (gastroesophageal reflux disease)    • Hearing loss, right    • Hemolytic anemia (HCC)    • History of transfusion     2018 - no adverse reaction   • Hypertension    • Malignant melanoma of leg, right ([facility] [address] ) 07/01/2022   • Palpitation    • Portal vein thrombosis    • PTSD (post-traumatic stress disorder)    • Pulmonary emboli (HCC)    • Squamous cell skin cancer 12/20/2022    SCCIS- 12/6/22   • Tobacco abuse      Past Surgical History:   Procedure Laterality Date   • CATARACT EXTRACTION Bilateral    • CHOLECYSTECTOMY  07/18/2017   • COLONOSCOPY     • ELBOW ARTHROPLASTY Left     bursectomy   • IR DRAINAGE TUBE CHECK WITH SCLEROSIS  10/06/2022   • IR DRAINAGE TUBE CHECK WITH SCLEROSIS  10/10/2022   • IR DRAINAGE TUBE CHECK WITH SCLEROSIS  10/20/2022   • IR DRAINAGE TUBE CHECK WITH SCLEROSIS  10/27/2022   • IR DRAINAGE TUBE CHECK WITH SCLEROSIS  11/04/2022   • IR DRAINAGE TUBE CHECK WITH SCLEROSIS  11/15/2022   • IR DRAINAGE TUBE CHECK WITH SCLEROSIS  11/25/2022   • IR DRAINAGE TUBE PLACEMENT  09/19/2022   • JOINT REPLACEMENT Right 02/02/2021    knee   • KNEE SURGERY Right     meniscus tear   • LYMPH NODE BIOPSY Right 07/29/2022    Procedure: BIOPSY LYMPH NODE SENTINEL;  Surgeon: Nilsa Ring MD;  [facility]: [facility];  Service: Surgical Oncology   • MOHS SURGERY Right 12/20/2022    Right dorsal hand Shawn Isabel   • WI ARTHRP KNE CONDYLE&PLATU MEDIAL&LAT COMPARTMENTS Right 02/02/2021    Procedure: ARTHROPLASTY KNEE TOTAL;  Surgeon: Raymond Bird MD;  [facility];  Service: Orthopedics   • WI ARTHRP KNE CONDYLE&PLATU MEDIAL&LAT COMPARTMENTS Left 11/17/2021    Procedure: TOTAL KNEE REPLACEMENT;  Surgeon: Raymond Bird MD;  [facility];  Service: Orthopedics   • WI LAPS SURG CHOLECYSTECTOMY Gabbi Bellis N/A 12/23/2017    Procedure: CHOLECYSTECTOMY LAPAROSCOPIC with cholangiogram;  Surgeon: Swapna Granados MD;  [facility];  Service: General   • WI SPLENECTOMY TOTAL SEPARATE PROCEDURE N/A 05/18/2017    Procedure: LAPAROSCOPIC HAND ASSIST SPLENECTOMY;  Surgeon: Vipin Padilla MD;  [facility];  Service: Surgical Oncology   • SHOULDER SURGERY Left     rotator cuff x4, reconstruction   • SKIN BIOPSY  5 May 2022   • SKIN CANCER EXCISION  29 July 2022   • SKIN LESION EXCISION Right 07/29/2022    Procedure: WIDE EXCISION RIGHT MEDIAL THIGH;  Surgeon: Calvin Chamorro MD;  [facility];  Service: Surgical Oncology     Family History   Problem Relation Age of Onset   • Cancer Mother    • Breast cancer Mother    • Other Father         CABG   • Heart attack Paternal Grandfather         acute MI     Social History     Socioeconomic History   • Marital status: /Civil Union     Spouse name: Not on file   • Number of children: Not on file   • Years of education: Not on file   • Highest education level: Not on file   Occupational History   • Not on file   Tobacco Use   • Smoking status: Some Days     Packs/day: 0 00     Years: 5 00     Pack years: 0 00     Types: Cigars, Cigarettes   • Smokeless tobacco: Current     Types: Snuff   • Tobacco comments:     5  a week average   Vaping Use   • Vaping Use: Never used   Substance and Sexual Activity   • Alcohol use:  Yes     Alcohol/week: 6 0 standard drinks     Types: 6 Cans of beer per week     Comment: socially   • Drug use: Yes     Types: Marijuana     Comment: medical marijuana   • Sexual activity: Yes     Partners: Female     Birth control/protection: None   Other Topics Concern   • Not on file   Social History Narrative    Daily coffee consumption (2 cups/day)    reitred     Social Determinants of Health     Financial Resource Strain: Not on file   Food Insecurity: No Food Insecurity   • Worried About Running Out of Food in the Last Year: Never true   • Ran Out of Food in the Last Year: Never true   Transportation Needs: No Transportation Needs   • Lack of Transportation (Medical): No   • Lack of Transportation (Non-Medical):  No   Physical Activity: Not on file   Stress: Not on file   Social Connections: Not on file   Intimate Partner Violence: Not on file   Housing Stability: Low Risk    • Unable to Pay for Housing in the Last Year: No   • Number of Places Lived in the Last Year: 1   • Unstable Housing in the Last Year: No       Current Outpatient Medications:   •  acetaminophen (TYLENOL) 325 mg tablet, Take 2 tablets (650 mg total) by mouth every 6 (six) hours as needed for mild pain, Disp:  , Rfl: 0  •  dabigatran etexilate (PRADAXA) 150 mg capsu, Take 1 capsule (150 mg total) by mouth every 12 (twelve) hours, Disp: 60 capsule, Rfl: 0  •  hydrochlorothiazide (HYDRODIURIL) 25 mg tablet, Take 1 tablet (25 mg total) by mouth daily (Patient taking differently: Take 25 mg by mouth every morning), Disp: 30 tablet, Rfl: 5  •  metoprolol succinate (TOPROL-XL) 25 mg 24 hr tablet, Take 0 5 tablets (12 5 mg total) by mouth daily (Patient taking differently: Take 12 5 mg by mouth every morning), Disp: 30 tablet, Rfl: 5  •  pantoprazole (PROTONIX) 40 mg tablet, Take 1 tablet (40 mg total) by mouth daily (Patient taking differently: Take 40 mg by mouth every morning), Disp: 90 tablet, Rfl: 3  •  potassium chloride (K-DUR,KLOR-CON) 20 mEq tablet, Take 1 tablet (20 mEq total) by mouth daily (Patient taking differently: Take 20 mEq by mouth every morning), Disp: 30 tablet, Rfl: 3  •  prazosin (MINIPRESS) 1 mg capsule, Take 1 mg by mouth daily at bedtime , Disp: , Rfl:   •  predniSONE 20 mg tablet, Take 2 tablets (40 mg total) by mouth daily, Disp: 60 tablet, Rfl: 0  •  VITAMIN D PO, Take 1,000 Units by mouth daily , Disp: , Rfl:   •  ALPRAZolam (XANAX) 0 5 mg tablet, Take 1 tablet (0 5 mg total) by mouth 2 (two) times a day Take one two hours before scan and second one 30 min before (Patient not taking: Reported on 12/20/2022), Disp: 2 tablet, Rfl: 0  •  ascorbic acid (VITAMIN C) 1000 MG tablet, Take 1 tablet (1,000 mg total) by mouth every 12 (twelve) hours for 6 doses (Patient taking differently: Take 1,000 mg by mouth daily Every other day), Disp: 6 tablet, Rfl: 0  •  benzonatate (TESSALON PERLES) 100 mg capsule, Take 1 capsule (100 mg total) by mouth 3 (three) times a day (Patient not taking: Reported on 12/20/2022), Disp: 20 capsule, Rfl: 0  •  furosemide (LASIX) 20 mg tablet, Take 1 tablet (20 mg total) by mouth daily (Patient not taking: Reported on 12/20/2022), Disp: 5 tablet, Rfl: 0  •  ondansetron (ZOFRAN) 4 mg tablet, Take 1 tablet (4 mg total) by mouth every 8 (eight) hours as needed for nausea or vomiting (Patient not taking: Reported on 12/20/2022), Disp: 20 tablet, Rfl: 0  •  sodium chloride, PF, 0 9 %, 10 mL by Intracatheter route daily Intracatheter flushing daily  May substitute prefilled syringe with normal saline 10 mL vials, 10 mL syringes, and 18 g blunt needles, Disp: 300 mL, Rfl: 0  •  sulfamethoxazole-trimethoprim (BACTRIM DS) 800-160 mg per tablet, Take 1 tablet by mouth 3 (three) times a week Monday, Wednesday and Friday (Patient not taking: Reported on 12/20/2022), Disp: 12 tablet, Rfl: 0  No Known Allergies  Vitals:    02/14/23 0829   BP: 130/80   Pulse: 93   Resp: 17   SpO2: 97%       Physical Exam  Constitutional: Patient is well-developed and well-nourished who appears the stated age in no acute distress   Patient is pleasant and talkative  HEENT:  Normocephalic  Sclerae are anicteric  Mucous membranes are moist  Neck is supple without adenopathy  No JVD  Chest: Equal chest rise, easy WOB  Cardiac: Heart is regular rate  Abdomen: Abdomen is non-tender, non-distended and without masses  Extremities: There is no clubbing or cyanosis  RLL edema 1+  Neuro: Grossly nonfocal  Gait is normal      Lymphatic: No evidence of cervical adenopathy bilaterally  No evidence of axillary adenopathy bilaterally  No evidence of inguinal adenopathy bilaterally  RT groin seroma evident  Skin: Warm, anicteric  Knee incision with 2 cm defect and fibrinous exudate  Psych:  Patient is pleasant and talkative      Pathology:  MELANOMA OF THE SKIN: Excision, Re-Excision  8th Edition - Protocol posted: 11/10/2021  MELANOMA OF THE SKIN: EXCISION, RE-EXCISION  - All Specimens  SPECIMEN   Procedure  Excision    Specimen Laterality  Right    TUMOR   Tumor Site  Skin of lower limb and hip: right knee         Histologic Type  Nodular melanoma    Maximum Tumor (Breslow) Thickness (Millimeters)  8 mm   Macroscopic Satellite Nodule(s)  Not identified    Ulceration  Not identified    Anatomic (Allen) Level  V (Melanoma invades subcutis)    Mitotic Rate  4 mitoses per mm2   Microsatellite(s)  Not identified    Lymphovascular Invasion  Not identified    Neurotropism  Not identified    Tumor-Infiltrating Lymphocytes  Present, nonbrisk    MARGINS     Margin Status for Invasive Melanoma  All margins negative for invasive melanoma    Margin Status for Melanoma in situ  All margins negative for melanoma in situ    REGIONAL LYMPH NODES     Regional Lymph Node Status  Tumor present in regional lymph node(s)    Total Number of Lymph Nodes with Tumor  1    Number of Drury Lymph Nodes with Tumor  1    Size of Largest Drury Node Metastatic Deposit  Rare melanoma cells identified on Melan-A and HMB45 stains      Size of Largest Junior Metastatic Deposit  Rare melanoma cells identified on Melan-A and HMB45 stains      Extranodal Extension  Not identified    Matted Nodes  Not identified    Total Number of Lymph Nodes Examined  1    Number of [place] Nodes Examined  1    PATHOLOGIC STAGE CLASSIFICATION (pTNM, AJCC 8th Edition)  Classification assigned in this report includes information from a prior procedure: See previous biopsy report ([id number])    pT Category  pT4a    pN Category  pN1a      Labs:  Reviewed in Epic personally    Imaging  XR chest portable    Result Date: 7/31/2022  Narrative: CHEST INDICATION:   Hypoxemia  COMPARISON:  7/29/2022 EXAM PERFORMED/VIEWS:  XR CHEST PORTABLE FINDINGS: Cardiomediastinal silhouette appears unremarkable  The lungs are clear  No pneumothorax or pleural effusion  Osseous structures appear within normal limits for patient age  Impression: No acute cardiopulmonary disease  Workstation performed: [id number]     XR chest portable    Result Date: 7/29/2022  Narrative: CHEST INDICATION:   Shortness of breath  COMPARISON:  12/18/2020  EXAM PERFORMED/VIEWS:  XR CHEST PORTABLE FINDINGS:  Monitoring leads and clips project over the chest  Cardiomediastinal silhouette appears stable  The lungs are clear  No pneumothorax or pleural effusion  Degenerative changes  Left upper quadrant embolization coils  Impression: No acute cardiopulmonary disease  Workstation performed: [id number]     NM lymphatic melanoma    Result Date: 7/29/2022  Narrative: SENTINEL NODE LYMPHOSCINTIGRAPHY INDICATION:   Lower extremity melanoma  FINDINGS: 0 55 mCi Tc-99m sulfur colloid (0 6 cc volume) was administered intradermally in divided doses by Dr Nishant Shoemaker in the region of the patients right lower extremity melanoma  Scintigraphic images were obtained over the right groin and right lower extremity in multiple projections   Single node is identified within the right groin Using scintigraphic guidance, the corresponding skin site was marked with an indelible marker  The patient was transferred to the operating room in satisfactory condition  Impression: 1  [place] lymph node localized to right inguinal region  Workstation performed: [id number]     CTA chest pe study    Result Date: 7/31/2022  Narrative: CTA - CHEST WITH IV CONTRAST - PULMONARY ANGIOGRAM INDICATION:   tachypnea, tachycardia, R/O PE  COMPARISON: 4/30/2022  TECHNIQUE: CTA examination of the chest was performed using angiographic technique according to a protocol specifically tailored to evaluate for pulmonary embolism  Axial, sagittal, and coronal 2D reformatted images were created from the source data and  submitted for interpretation  In addition, coronal 3D MIP postprocessing was performed on the acquisition scanner  Radiation dose length product (DLP) for this visit:  572 mGy-cm   This examination, like all CT scans performed in the [facility], was performed utilizing techniques to minimize radiation dose exposure, including the use of iterative reconstruction and automated exposure control  IV Contrast:  70 mL of iohexol (OMNIPAQUE)  FINDINGS: PULMONARY ARTERIAL TREE:  Scattered segmental/subsegmental pulmonary emboli are seen throughout the lung    LUNGS:  Scattered groundglass opacities suggests a small vessel or small airways process process  There is no tracheal or endobronchial lesion  PLEURA:  Unremarkable  HEART/GREAT VESSELS:  RV LV ratio is greater than 0 9 concerning for right ventricular strain    No thoracic aortic aneurysm  MEDIASTINUM AND BRENDA:  Unremarkable  CHEST WALL AND LOWER NECK:   Unremarkable  VISUALIZED STRUCTURES IN THE UPPER ABDOMEN:  Unremarkable  OSSEOUS STRUCTURES:  No acute fracture or destructive osseous lesion  Impression: Right middle lobe pulmonary embolism are noted    RV LV ratio is greater than 0 9 concerning for right ventricular strain     I personally discussed this study with [address] [place] [address] on 7/31/2022 at 2:17 AM  Workstation performed: [id number]     VAS lower limb venous duplex study, complete bilateral    Result Date: 8/1/2022  Narrative:  [facility] REPORT CLINICAL: Indications: PT C/O PE  He is currently receiving IV heparin  Physician wants to rule out B/L LE DVT  Operative History: No prior heart or vascular surgery Risk Factors The patient has history of HTN, current smoking, GERD, autoimmune hemolytic anemia, malignant melanoma right leg, COVID 19 virus (12/20), PE, portal vein thrombus, PTSD, and DVT  Height:  67 inches  Weight:  192 lbs  CONCLUSION:  Impression:  RIGHT LOWER LIMB: Based on color flow imaging, evaluation shows no evidence of acute deep vein thrombosis  The common femoral artery and sapheno-femoral junction were not evaluated secondary to lymph node removal at groin/pain  Tech note: A non-vascular structure measuring 3 47 cm was identified at the groin at the lymph node removal site  No evidence of superficial thrombophlebitis noted  Doppler evaluation shows a normal response to augmentation maneuvers  Popliteal, posterior tibial, and anterior tibial arterial Doppler waveforms are triphasic  LEFT LOWER LIMB: No evidence of acute or chronic deep vein thrombosis  No evidence of superficial thrombophlebitis noted  Doppler evaluation shows a normal response to augmentation maneuvers  Popliteal, posterior tibial, and anterior tibial arterial Doppler waveforms are triphasic  Technical findings were posted on EPIC  SIGNATURE: Electronically Signed by: Raquel Munoz on 2022-08-01 09:28:42 AM    US bedside procedure    Result Date: 7/31/2022  Narrative: 1 2 [id number]    Echo complete w/ contrast if indicated    Result Date: 7/31/2022  Narrative: •  Left Ventricle: Left ventricular cavity size is normal  Wall thickness is mildly increased  The left ventricular ejection fraction is 70%  Systolic function is hyperdynamic   Wall motion is normal  • Right Ventricle: Right ventricular cavity size is dilated  Systolic function is mildly reduced  •  Tricuspid Valve: There is moderate to severe regurgitation  The right ventricular systolic pressure is severely elevated  The estimated right ventricular systolic pressure is 80 mmHg  I reviewed the above laboratory and imaging data  Discussion/Summary: Pt is s/p WLE + SLN for T4aN1a melanoma of right knee  Did not receive adjuvant treatment 2/2 side effects  Doing well  Due for PET very soon  Will see him again in 6 mo  Again discussed vigilance about new skin lesions in this region or new lumps/bumps in LN basins  Discussed need for continued sun protection with sunscreen, hats, minimizing sun exposure  Patient expressed understanding and endorsement

## 2023-02-16 ENCOUNTER — APPOINTMENT (OUTPATIENT)
Dept: LAB | Facility: MEDICAL CENTER | Age: 68
End: 2023-02-16

## 2023-02-17 ENCOUNTER — TELEPHONE (OUTPATIENT)
Dept: HEMATOLOGY ONCOLOGY | Facility: CLINIC | Age: 68
End: 2023-02-17

## 2023-02-17 DIAGNOSIS — D59.10 AUTOIMMUNE HEMOLYTIC ANEMIA (HCC): Primary | ICD-10-CM

## 2023-02-17 RX ORDER — PREDNISONE 20 MG/1
20 TABLET ORAL DAILY
Qty: 30 TABLET | Refills: 1 | Status: SHIPPED | OUTPATIENT
Start: 2023-02-17

## 2023-02-17 NOTE — TELEPHONE ENCOUNTER
CALL RETURN FORM   Reason for patient call? Patient has concerns on his blood work from yesterday    Patient's primary oncologist? Dr Loyola Outlaw    Name of person the patient was calling for? Vikram Bragg   Any additional information to add, if applicable? N/A   Informed patient that the message will be forwarded to the team and someone will get back to them as soon as possible    Did you relay this information to the patient?   Yes

## 2023-02-17 NOTE — TELEPHONE ENCOUNTER
Reviewed pt's complaints of severe fatigue, headache and being severely winded with exertion, with  Dr Juan R Wilkes is aware of pt's platelet count of 236 ahd hgb 11 8  Pt denies fever, cough, congestion, diarrhea or blood in his stools  Dr Juan R Wilkes would like pt to test for COVID, obtain a cbc and cmp, continue on prednisone 20 mg daily and c/b in one week or if symptoms become worse  Patient is aware and will do so

## 2023-02-20 ENCOUNTER — OFFICE VISIT (OUTPATIENT)
Dept: DERMATOLOGY | Facility: CLINIC | Age: 68
End: 2023-02-20

## 2023-02-20 DIAGNOSIS — L90.5 SCAR: Primary | ICD-10-CM

## 2023-02-20 NOTE — PROGRESS NOTES
WOUND CHECK    Physical Exam:  • Anatomic Location Affected:  Right dorsum hand  • Description of wound: well healing purple to pink surgical site  • Closure Type: secondary intention  •       Additional History of Present Condition:  S/P 12/20/22 mohs procedure preformed by Dr Altagracia Morel  Patient is pleased with wound healing  Denies fever/chills  Denies pain or irritation  Pt is not limited with movement  No other concerns today  Assessment and Plan:  Based on a thorough discussion of this condition and the management approach to it (including a comprehensive discussion of the known risks, side effects and potential benefits of treatment), the patient (family) agrees to implement the following specific plan:  • May discontinue any dressing changes  • Recommend use of SPF when sun exposed  • Continue with routine skin exams  • Next skin exam scheduled for 3/7/23 with Dr London Villareal  • Pt is still following up with Dr Sonya Cline for the Melanoma of the right leg    Healing wound, discussed that can hold on further wound care  Wound check prn      Scribe Attestation    I,:  Tito Galvan am acting as a scribe while in the presence of the attending physician :       I,:  Robyn Holliday MD personally performed the services described in this documentation    as scribed in my presence :

## 2023-02-24 ENCOUNTER — APPOINTMENT (OUTPATIENT)
Dept: LAB | Facility: MEDICAL CENTER | Age: 68
End: 2023-02-24

## 2023-02-24 DIAGNOSIS — E80.6 HYPERBILIRUBINEMIA: ICD-10-CM

## 2023-02-24 DIAGNOSIS — D59.10 AUTOIMMUNE HEMOLYTIC ANEMIA (HCC): ICD-10-CM

## 2023-02-24 DIAGNOSIS — D75.839 THROMBOCYTOSIS: ICD-10-CM

## 2023-02-24 DIAGNOSIS — I26.09 OTHER PULMONARY EMBOLISM WITH ACUTE COR PULMONALE, UNSPECIFIED CHRONICITY (HCC): ICD-10-CM

## 2023-02-24 LAB
ALBUMIN SERPL BCP-MCNC: 4 G/DL (ref 3.5–5)
ALP SERPL-CCNC: 92 U/L (ref 46–116)
ALT SERPL W P-5'-P-CCNC: 44 U/L (ref 12–78)
ANION GAP SERPL CALCULATED.3IONS-SCNC: 5 MMOL/L (ref 4–13)
AST SERPL W P-5'-P-CCNC: 30 U/L (ref 5–45)
BILIRUB SERPL-MCNC: 1.63 MG/DL (ref 0.2–1)
BUN SERPL-MCNC: 28 MG/DL (ref 5–25)
CALCIUM SERPL-MCNC: 9.8 MG/DL (ref 8.3–10.1)
CHLORIDE SERPL-SCNC: 105 MMOL/L (ref 96–108)
CO2 SERPL-SCNC: 29 MMOL/L (ref 21–32)
CREAT SERPL-MCNC: 1.4 MG/DL (ref 0.6–1.3)
GFR SERPL CREATININE-BSD FRML MDRD: 51 ML/MIN/1.73SQ M
GLUCOSE SERPL-MCNC: 141 MG/DL (ref 65–140)
POTASSIUM SERPL-SCNC: 3.3 MMOL/L (ref 3.5–5.3)
PROT SERPL-MCNC: 6.9 G/DL (ref 6.4–8.4)
SODIUM SERPL-SCNC: 139 MMOL/L (ref 135–147)

## 2023-02-27 ENCOUNTER — HOSPITAL ENCOUNTER (OUTPATIENT)
Dept: RADIOLOGY | Age: 68
Discharge: HOME | End: 2023-02-27

## 2023-02-27 DIAGNOSIS — C43.71 MALIGNANT MELANOMA OF LEG, RIGHT (HCC): ICD-10-CM

## 2023-02-27 LAB — GLUCOSE SERPL-MCNC: 102 MG/DL (ref 65–140)

## 2023-02-27 RX ORDER — ALPRAZOLAM 0.5 MG/1
0.5 TABLET ORAL 2 TIMES DAILY
Qty: 2 TABLET | Refills: 0 | Status: SHIPPED | OUTPATIENT
Start: 2023-02-27 | End: 2023-03-03 | Stop reason: SDUPTHER

## 2023-02-27 NOTE — TELEPHONE ENCOUNTER
MEDICATION REFILL ROUTE TO RN    Who is calling? Patient   If someone other than patient is calling, are they listed on the communication consent form?  na   Medication (name and dose) ALPRAZolam (XANAX) 0 5 mg tablet        How many pills left 0   Preferred Pharmacy / Address  [facility] [address] Dr Lissette Hensley   Call back number  [phone]   Relevant Information

## 2023-02-27 NOTE — TELEPHONE ENCOUNTER
CALL RETURN FORM   Reason for patient call? Patient calling to leave Dr Issac Meyer a message he was scheduled for a pet scan today and it was moved to 3/2/23 he is also is requesting a refill for his [address]   Patient's primary oncologist? Dr Issac Meyer   Name of person the patient was calling for? Dr Issac Meyer   Any additional information to add, if applicable? [phone]   Informed patient that the message will be forwarded to the team and someone will get back to them as soon as possible    Did you relay this information to the patient?  yes

## 2023-03-02 ENCOUNTER — HOSPITAL ENCOUNTER (OUTPATIENT)
Dept: RADIOLOGY | Age: 68
Discharge: HOME | End: 2023-03-02

## 2023-03-02 ENCOUNTER — TELEPHONE (OUTPATIENT)
Dept: HEMATOLOGY ONCOLOGY | Facility: CLINIC | Age: 68
End: 2023-03-02

## 2023-03-02 LAB — GLUCOSE SERPL-MCNC: 127 MG/DL (ref 65–140)

## 2023-03-02 NOTE — LETTER
[address] [place] [address]  [address] [facility] [address]      March 8, 2023    MRN: [id number]     Phone: [phone]     Dear Mr Jaylene Wheeler recently had a(n) Nuclear Medicine performed on 3/2/2023 at  [address] [place] [address] that was requested by Ed Adler MD  The study was reviewed by a radiologist, which is a physician who specializes in medical imaging  The radiologist issued a report describing his or her findings  In that report there was a finding that the radiologist felt warranted further discussion with your health care provider and that discussion would be beneficial to you  The results were sent to Ed Adler MD on 03/03/2023  8:13 AM  We recommend that you contact Ed Adler MD at [phone] or set up an appointment to discuss the results of the imaging test  If you have already heard from Ed Adler MD regarding the results of your study, you can disregard this letter  This letter is not meant to alarm you, but intended to encourage you to follow-up on your results with the provider that sent you for the imaging study  In addition, we have enclosed answers to frequently asked questions by other patients who have also received a letter to review results with their health care provider (see page two)  Thank you for choosing [address] [place] [address] for your medical imaging needs  FREQUENTLY ASKED QUESTIONS    1  Why am I receiving this letter? [facility] requires us to notify patients who have findings on imaging exams that may require more testing or follow-up with a health professional within the next 3 months          2  How serious is the finding on the imaging test?  This letter is sent to all patients who may need follow-up or more testing within the next 3 months  Receiving this letter does not necessarily mean you have a life-threatening imaging finding or disease  Recommendations in the radiologist’s imaging report are general in nature and it is up to your healthcare provider to say whether those recommendations make sense for your situation  You are strongly encouraged to talk to your health care provider about the results and ask whether additional steps need to be taken  3  Where can I get a copy of the final report for my recent radiology exam?  To get a full copy of the report you can access your records online at [email or web address] or please contact [facility] Medical Records Department at [phone] Monday through Friday between 8 am and 6 pm          4  What do I need to do now? Please contact your health care provider who requested the imaging study to discuss what further actions (if any) are needed  You may have already heard from (your ordering provider) in regard to this test in which case you can disregard this letter  NOTICE IN ACCORDANCE WITH THE [place] STATE “PATIENT TEST RESULT INFORMATION ACT OF 2018”    You are receiving this notice as a result of a determination by your diagnostic imaging service that further discussions of your test results are warranted and would be beneficial to you  The complete results of your test or tests have been or will be sent to the health care practitioner that ordered the test or tests  It is recommended that you contact your health care practitioner to discuss your results as soon as possible

## 2023-03-03 ENCOUNTER — APPOINTMENT (OUTPATIENT)
Dept: LAB | Facility: MEDICAL CENTER | Age: 68
End: 2023-03-03

## 2023-03-03 DIAGNOSIS — C43.71 MALIGNANT MELANOMA OF LEG, RIGHT (HCC): ICD-10-CM

## 2023-03-03 RX ORDER — ALPRAZOLAM 0.5 MG/1
0.5 TABLET ORAL 2 TIMES DAILY
Qty: 2 TABLET | Refills: 0 | Status: SHIPPED | OUTPATIENT
Start: 2023-03-03

## 2023-03-04 ENCOUNTER — DOCUMENTATION (OUTPATIENT)
Dept: OTHER | Facility: HOSPITAL | Age: 68
End: 2023-03-04

## 2023-03-04 ENCOUNTER — HOSPITAL ENCOUNTER (OUTPATIENT)
Dept: RADIOLOGY | Facility: HOSPITAL | Age: 68
Discharge: HOME | End: 2023-03-04
Attending: INTERNAL MEDICINE

## 2023-03-04 ENCOUNTER — TELEPHONE (OUTPATIENT)
Dept: OTHER | Facility: HOSPITAL | Age: 68
End: 2023-03-04

## 2023-03-04 DIAGNOSIS — C43.9 METASTATIC MELANOMA (HCC): Primary | ICD-10-CM

## 2023-03-04 DIAGNOSIS — C79.31 METASTASIS TO BRAIN (HCC): ICD-10-CM

## 2023-03-04 DIAGNOSIS — C79.31 BRAIN METASTASES (HCC): ICD-10-CM

## 2023-03-04 RX ORDER — PANTOPRAZOLE SODIUM 40 MG/1
40 TABLET, DELAYED RELEASE ORAL DAILY
Qty: 30 TABLET | Refills: 1 | Status: SHIPPED | OUTPATIENT
Start: 2023-03-04

## 2023-03-04 RX ORDER — DEXAMETHASONE 4 MG/1
4 TABLET ORAL EVERY 6 HOURS
Qty: 28 TABLET | Refills: 0 | Status: SHIPPED | OUTPATIENT
Start: 2023-03-04

## 2023-03-04 RX ADMIN — GADOBUTROL 9 ML: 604.72 INJECTION INTRAVENOUS at 08:50

## 2023-03-04 NOTE — TELEPHONE ENCOUNTER
Mr Luis Alberto Nelson is a 75 Y male with known stage IIIc melanoma who has been following up with Dr Lorne Medina, was previously on encorafenib/binimetinib as part of adjuvant therapy, but this was discontinued later due to patient having difficulty tolerating the medication due to side effects  Close surveillance was recommended  Patient's most recent PET scan from 3/2/23 raised concern for widespread disease recurrence with uptake in the bilateral cerebral hemispheres concerning for intracranial mets, retroperitoneal nodule just lateral to right psoas, suspicious for retroperitoneal mets, scattered new FDG avid lymph nodes along proximal iliac chains, multiple new FDG avid osseous mets, and multiple new FDG avid right inguinal LNs and subcutaneous nodules along right suspicious for mets  Brain MRI was ordered by primary oncology team and it resulted today reporting too numerous to count metastatic lesions in bilateral cerebral hemispheres and to a lesser extent in bilateral cerebellum with perilesional vasogenic edema, worse in right frontal lobe  Case was discussed with primary oncologist, Dr Issac Meyer Patient will need radiation treatment for his brain mets and likely immunotherapy in the future to address his systemic mets  I called Mr Ewelina Delgado, he reported intermittent episodes of headaches which have been responding well to Tylenol  He denied any worsening of his headaches or any focal neurological deficits in recent days  I advised him to start steroids (decadron 4mg q6h) along with PPI for GI prophylaxis - medications have been prescribed to his [employer]  He does already have an appt with Dr Issac Meyer on 3/6/23  I placed an ambulatory referral for radiation oncology team  Meanwhile, I advised that the patient go to the nearest ED if he notices any worsening weakness, focal weakness, unsteadiness/difficulty with balancing  Patient verbalized understanding of the above recommendations

## 2023-03-06 ENCOUNTER — OFFICE VISIT (OUTPATIENT)
Dept: HEMATOLOGY ONCOLOGY | Facility: CLINIC | Age: 68
End: 2023-03-06

## 2023-03-06 ENCOUNTER — TELEPHONE (OUTPATIENT)
Dept: HEMATOLOGY ONCOLOGY | Facility: CLINIC | Age: 68
End: 2023-03-06

## 2023-03-06 VITALS
HEART RATE: 92 BPM | HEIGHT: 68 IN | WEIGHT: 200 LBS | SYSTOLIC BLOOD PRESSURE: 126 MMHG | RESPIRATION RATE: 16 BRPM | TEMPERATURE: 98 F | BODY MASS INDEX: 30.31 KG/M2 | OXYGEN SATURATION: 98 % | DIASTOLIC BLOOD PRESSURE: 60 MMHG

## 2023-03-06 DIAGNOSIS — C43.71 MALIGNANT MELANOMA OF LEG, RIGHT (HCC): ICD-10-CM

## 2023-03-06 DIAGNOSIS — D59.10 AUTOIMMUNE HEMOLYTIC ANEMIA (HCC): ICD-10-CM

## 2023-03-06 DIAGNOSIS — Z85.820 PERSONAL HISTORY OF MALIGNANT MELANOMA: Primary | ICD-10-CM

## 2023-03-06 DIAGNOSIS — C43.9 METASTATIC MELANOMA (HCC): ICD-10-CM

## 2023-03-06 NOTE — TELEPHONE ENCOUNTER
Spoke to patient to see if he wanted me to schedule appointment with ophthalmologist  His wife is going to contact someone at   Also mentioned that Interventional Radiology will contact him about the appt  For his biopsy

## 2023-03-06 NOTE — LETTER
March 8, 2023     [facility], [address]  [address]  [address] [place] [address]    Patient: Jose Rafael Balderas   YOB: 1955   Date of Visit: 3/6/2023       Dear Dr Martel: Thank you for referring Geovany Mary to me for evaluation  Below are my notes for this consultation  If you have questions, please do not hesitate to call me  I look forward to following your patient along with you  Sincerely,        Roge Herrera MD        CC: Jerome Jeffers, MD Katy Sueor, MD Van Guzman DO  3/8/2023  2:21 PM  Attested  [facility] [address] [address]  [phone]  [phone]     Date of Visit: 3/6/2023  Name: Jose Rafael Balderas   YOB: 1955     Subjective     Subjective    VISIT DIAGNOSIS:  Diagnoses and all orders for this visit:    Personal history of malignant melanoma    Malignant melanoma of leg, right [facility])  -     Ambulatory Referral to Interventional Radiology; Future    Metastatic melanoma ([facility] )  -     Ambulatory Referral to Interventional Radiology; Future        Oncology History   Personal history of malignant melanoma   5/31/2022 Biopsy    Right Leg, Shave Biopsy   Melanoma extending to the deep specimen margin  Thickness: 7 0mm  Ulceration: not seen   Mitoses: 3      5/31/2022 -  Cancer Staged    Staging form: Melanoma of the Skin, AJCC 8th Edition  - Clinical stage from 5/31/2022: Stage IIB (cT4a, cN0, cM0) - Signed by Roge Herrera MD on 8/25/2022 7/1/2022 Initial Diagnosis    Malignant melanoma of leg, right ([facility] [address] )     7/29/2022 Surgery    A  Lymph node, sentinel, #1:  One lymph node with rare metastatic melanoma cells (1/1), subcapsular location  Immunohistochemical study for MART-1, HMB45 and S100 supports the findings  B  Leg, right, knee, wide excision:  Residual melanoma, nodular type, and scar; margins free  See synoptic report       7/29/2022 -  Cancer Staged    Staging form: Melanoma of the Skin, AJCC 8th Edition  - Pathologic stage from 7/29/2022: Stage IIIC (pT4a, pN1a, cM0) - Signed by Jean-Pierre Rubi MD on 8/25/2022          Cancer Staging   Personal history of malignant melanoma  Staging form: Melanoma of the Skin, AJCC 8th Edition  - Clinical stage from 5/31/2022: Stage IIB (cT4a, cN0, cM0) - Signed by Jean-Pierre Rubi MD on 8/25/2022  - Pathologic stage from 7/29/2022: Stage IIIC (pT4a, pN1a, cM0) - Signed by Jean-Pierre Rubi MD on 8/25/2022     Treatment Details   Treatment goal [No plan goal]   Plan Name ONE-TIME BLOOD PRODUCT TRANSFUSION PLAN   Status Inactive   Start Date 7/15/2022   End Date 7/15/2022   Provider VISHNU Otto   Chemotherapy [No matching medication found in this treatment plan]     Treatment Details   Treatment goal Palliative   Plan Name OP RiTUXimab weekly x 4, every 6 months   Status Active   Start Date 8/1/2022   End Date 8/23/2022   Provider Sunshine Trujillo,    Chemotherapy riTUXimab (RITUXAN) subsequent titrated chemo infusion, 375 mg/m2 = 746 2 mg, Intravenous, Once, 1 of 1 cycle  Administration: 746 2 mg (8/9/2022), 746 2 mg (8/16/2022), 746 2 mg (8/23/2022)    riTUXimab (RITUXAN) first titrated chemo infusion, 375 mg/m2 = 746 2 mg, Intravenous, Once, 1 of 1 cycle  Administration: 746 2 mg (8/1/2022)          HISTORY OF PRESENT ILLNESS: Amber Phillips is a 75 y o  male  who who has stage IV metastatic melanoma here for follow-up  Recent MRI and NM PET CT showing findings concerning for widespread disease recurrence  Multiple foci suspicious for brain metastasis, pulmonary nodules, retroperitoneal nodules, iliac chains, and bone mets  Attempted treatment with adjuvant therapy with encorafenib and binimetinib previously  However could not tolerate side effects from medications  They made him feel unwell  He stated he did not wish to feel like that      With changes in recent imaging findings, patient is motivated to retry encorafenib and binimetinib regardless of prior symptoms          REVIEW OF SYSTEMS:  Review of Systems   Constitutional: Negative for chills, fatigue and fever  HENT:   Negative for lump/mass  Eyes: Positive for eye problems (blurry vision left eye)  Respiratory: Negative for cough and shortness of breath  Cardiovascular: Positive for palpitations  Negative for chest pain and leg swelling  Gastrointestinal: Negative for abdominal pain, diarrhea, nausea and vomiting  Genitourinary: Negative for dysuria, frequency and hematuria  Musculoskeletal: Positive for gait problem  Negative for arthralgias and myalgias  Skin: Negative for rash  Neurological: Positive for dizziness, gait problem and headaches  Negative for extremity weakness, light-headedness, numbness, seizures and speech difficulty  Hematological: Negative for adenopathy  Does not bruise/bleed easily  Psychiatric/Behavioral: Negative for depression  The patient is not nervous/anxious           MEDICATIONS:    Current Outpatient Medications:   •  acetaminophen (TYLENOL) 325 mg tablet, Take 2 tablets (650 mg total) by mouth every 6 (six) hours as needed for mild pain, Disp:  , Rfl: 0  •  ALPRAZolam (XANAX) 0 5 mg tablet, Take 1 tablet (0 5 mg total) by mouth 2 (two) times a day Take one tablet one hour before scan and bring the second one with you to take right before the MRI if needed, Disp: 2 tablet, Rfl: 0  •  dabigatran etexilate (PRADAXA) 150 mg capsu, Take 1 capsule (150 mg total) by mouth every 12 (twelve) hours, Disp: 60 capsule, Rfl: 0  •  dexamethasone (DECADRON) 4 mg tablet, Take 1 tablet (4 mg total) by mouth every 6 (six) hours Please make sure you're on Protonix 40 mg daily for GI prophylaxis, Disp: 28 tablet, Rfl: 0  •  hydrochlorothiazide (HYDRODIURIL) 25 mg tablet, Take 1 tablet (25 mg total) by mouth daily (Patient taking differently: Take 25 mg by mouth every morning), Disp: 30 tablet, Rfl: 5  •  metoprolol succinate (TOPROL-XL) 25 mg 24 hr tablet, Take 0 5 tablets (12 5 mg total) by mouth daily (Patient taking differently: Take 12 5 mg by mouth every morning), Disp: 30 tablet, Rfl: 5  •  ondansetron (ZOFRAN) 4 mg tablet, Take 1 tablet (4 mg total) by mouth every 8 (eight) hours as needed for nausea or vomiting, Disp: 20 tablet, Rfl: 0  •  pantoprazole (PROTONIX) 40 mg tablet, Take 1 tablet (40 mg total) by mouth daily (Patient taking differently: Take 40 mg by mouth every morning), Disp: 90 tablet, Rfl: 3  •  pantoprazole (PROTONIX) 40 mg tablet, Take 1 tablet (40 mg total) by mouth daily, Disp: 30 tablet, Rfl: 1  •  potassium chloride (K-DUR,KLOR-CON) 20 mEq tablet, Take 1 tablet (20 mEq total) by mouth daily (Patient taking differently: Take 20 mEq by mouth every morning), Disp: 30 tablet, Rfl: 3  •  prazosin (MINIPRESS) 1 mg capsule, Take 1 mg by mouth daily at bedtime , Disp: , Rfl:   •  predniSONE 20 mg tablet, Take 1 tablet (20 mg total) by mouth daily, Disp: 30 tablet, Rfl: 1  •  VITAMIN D PO, Take 1,000 Units by mouth daily , Disp: , Rfl:   •  ascorbic acid (VITAMIN C) 1000 MG tablet, Take 1 tablet (1,000 mg total) by mouth every 12 (twelve) hours for 6 doses (Patient taking differently: Take 1,000 mg by mouth daily Every other day), Disp: 6 tablet, Rfl: 0  •  benzonatate (TESSALON PERLES) 100 mg capsule, Take 1 capsule (100 mg total) by mouth 3 (three) times a day (Patient not taking: Reported on 12/20/2022), Disp: 20 capsule, Rfl: 0  •  furosemide (LASIX) 20 mg tablet, Take 1 tablet (20 mg total) by mouth daily (Patient not taking: Reported on 12/20/2022), Disp: 5 tablet, Rfl: 0  •  sodium chloride, PF, 0 9 %, 10 mL by Intracatheter route daily Intracatheter flushing daily   May substitute prefilled syringe with normal saline 10 mL vials, 10 mL syringes, and 18 g blunt needles, Disp: 300 mL, Rfl: 0  •  sulfamethoxazole-trimethoprim (BACTRIM DS) 800-160 mg per tablet, Take 1 tablet by mouth 3 (three) times a week Monday, Wednesday and Friday (Patient not taking: Reported on 12/20/2022), Disp: 12 tablet, Rfl: 0     ALLERGIES:  No Known Allergies     ACTIVE PROBLEMS:  Patient Active Problem List   Diagnosis   • 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   • Hyperbilirubinemia   • Symptomatic anemia   • Pulmonary embolism with acute cor pulmonale (HCC)   • Portal vein thrombosis   • Elevated blood pressure reading without diagnosis of hypertension   • Shortness of breath   • Gastroesophageal reflux disease   • Autoimmune hemolytic anemia   • ARABELLA (acute kidney injury) ([facility] )   • Splenomegaly   • Iron overload due to repeated red blood cell transfusions   • Posttraumatic stress disorder   • Essential hypertension   • Glucose intolerance (impaired glucose tolerance)   • Renal insufficiency   • Auto immune neutropenia (HCC)   • Primary osteoarthritis of left knee   • Pain in joint, lower leg   • Pain in left knee   • COVID-19   • Thrombocytosis   • Primary localized osteoarthrosis of the knee, right   • Hyperglycemia   • Chronic bilateral low back pain with bilateral sciatica   • Hypercholesterolemia   • Personal history of malignant melanoma   • Dyspnea   • Acute respiratory failure with hypoxia (HCC)   • Elevated serum creatinine   • Elevated bilirubin   • Leukocytosis   • Lymphocele after surgical procedure   • Encounter for follow-up surveillance of melanoma          PAST MEDICAL HISTORY:   Past Medical History:   Diagnosis Date   • Anemia 11/02/2016   • Anxiety    • Arthritis    • Autoimmune hemolytic anemia ([facility] )    • Claustrophobia    • COVID 12/2020   • DVT (deep venous thrombosis) (HCC)    • GERD (gastroesophageal reflux disease)    • Hearing loss, right    • Hemolytic anemia ([facility] [address] )    • History of transfusion     2018 - no adverse reaction   • Hypertension    • Malignant melanoma of leg, right ([facility] ) 07/01/2022   • Palpitation    • Portal vein thrombosis    • PTSD (post-traumatic stress disorder)    • Pulmonary emboli (HCC)    • Squamous cell skin cancer 12/20/2022    SCCIS- 12/6/22   • Tobacco abuse         PAST SURGICAL HISTORY:  Past Surgical History:   Procedure Laterality Date   • CATARACT EXTRACTION Bilateral    • CHOLECYSTECTOMY  07/18/2017   • COLONOSCOPY     • ELBOW ARTHROPLASTY Left     bursectomy   • IR DRAINAGE TUBE CHECK WITH SCLEROSIS  10/06/2022   • IR DRAINAGE TUBE CHECK WITH SCLEROSIS  10/10/2022   • IR DRAINAGE TUBE CHECK WITH SCLEROSIS  10/20/2022   • IR DRAINAGE TUBE CHECK WITH SCLEROSIS  10/27/2022   • IR DRAINAGE TUBE CHECK WITH SCLEROSIS  11/04/2022   • IR DRAINAGE TUBE CHECK WITH SCLEROSIS  11/15/2022   • IR DRAINAGE TUBE CHECK WITH SCLEROSIS  11/25/2022   • IR DRAINAGE TUBE PLACEMENT  09/19/2022   • JOINT REPLACEMENT Right 02/02/2021    knee   • KNEE SURGERY Right     meniscus tear   • LYMPH NODE BIOPSY Right 07/29/2022    Procedure: BIOPSY LYMPH NODE SENTINEL;  Surgeon: Kimberli Dutton MD;  [facility];  Service: Surgical Oncology   • MOHS SURGERY Right 12/20/2022    Right dorsal hand SCCIS-Dr Isabel   • ME ARTHRP KNE CONDYLE&PLATU MEDIAL&LAT COMPARTMENTS Right 02/02/2021    Procedure: ARTHROPLASTY KNEE TOTAL;  Surgeon: Jerod Starr MD;  [facility];  Service: Orthopedics   • ME ARTHRP KNE CONDYLE&PLATU MEDIAL&LAT COMPARTMENTS Left 11/17/2021    Procedure: TOTAL KNEE REPLACEMENT;  Surgeon: Jerod Starr MD;  [facility];  Service: Orthopedics   • ME LAPS SURG CHOLECYSTECTOMY Maryruth Fairly N/A 12/23/2017    Procedure: CHOLECYSTECTOMY LAPAROSCOPIC with cholangiogram;  Surgeon: Alfredo Patel MD;  [facility];  Service: General   • ME SPLENECTOMY TOTAL SEPARATE PROCEDURE N/A 05/18/2017    Procedure: LAPAROSCOPIC HAND ASSIST SPLENECTOMY;  Surgeon: Marilia Palmer MD;  [facility];  Service: Surgical Oncology   • SHOULDER SURGERY Left     rotator cuff x4, reconstruction   • SKIN BIOPSY  5 May 2022   • SKIN CANCER EXCISION  29 July 2022   • SKIN LESION EXCISION Right 07/29/2022    Procedure: WIDE EXCISION RIGHT MEDIAL THIGH;  Surgeon: Janay Anderson MD;  [facility]: [facility];  Service: Surgical Oncology        SOCIAL HISTORY:  Social History     Socioeconomic History   • Marital status: /Civil Union     Spouse name: None   • Number of children: None   • Years of education: None   • Highest education level: None   Occupational History   • None   Tobacco Use   • Smoking status: Some Days     Packs/day: 0 00     Years: 5 00     Pack years: 0 00     Types: Cigars, Cigarettes   • Smokeless tobacco: Current     Types: Snuff   • Tobacco comments:     5  a week average   Vaping Use   • Vaping Use: Never used   Substance and Sexual Activity   • Alcohol use: Yes     Alcohol/week: 6 0 standard drinks     Types: 6 Cans of beer per week     Comment: socially   • Drug use: Yes     Types: Marijuana     Comment: medical marijuana   • Sexual activity: Yes     Partners: Female     Birth control/protection: None   Other Topics Concern   • None   Social History Narrative    Daily coffee consumption (2 cups/day)    reitred     Social Determinants of Health     Financial Resource Strain: Not on file   Food Insecurity: No Food Insecurity   • Worried About Running Out of Food in the Last Year: Never true   • Ran Out of Food in the Last Year: Never true   Transportation Needs: No Transportation Needs   • Lack of Transportation (Medical): No   • Lack of Transportation (Non-Medical):  No   Physical Activity: Not on file   Stress: Not on file   Social Connections: Not on file   Intimate Partner Violence: Not on file   Housing Stability: Low Risk    • Unable to Pay for Housing in the Last Year: No   • Number of Places Lived in the Last Year: 1   • Unstable Housing in the Last Year: No        FAMILY HISTORY:  Family History   Problem Relation Age of Onset   • Cancer Mother    • Breast cancer Mother    • Other Father         CABG   • Heart attack Paternal Grandfather acute MI        Objective     Objective    PHYSICAL EXAMINATION:   Blood pressure 126/60, pulse 92, temperature 98 °F (36 7 °C), temperature source Temporal, resp  rate 16, height 5' 8" (1 727 m), weight 90 7 kg (200 lb), SpO2 98 %  Pain Score: 0-No pain      Physical Exam    I reviewed lab data in the chart      WBC   Date Value Ref Range Status   03/03/2023 19 69 (H) 4 31 - 10 16 Thousand/uL Final   02/24/2023 19 27 (H) 4 31 - 10 16 Thousand/uL Final   02/16/2023 14 97 (H) 4 31 - 10 16 Thousand/uL Final     Hemoglobin   Date Value Ref Range Status   03/03/2023 11 6 (L) 12 0 - 17 0 g/dL Final   02/24/2023 12 0 12 0 - 17 0 g/dL Final   02/16/2023 11 8 (L) 12 0 - 17 0 g/dL Final     Platelets   Date Value Ref Range Status   03/03/2023 723 (H) 149 - 390 Thousands/uL Final   02/24/2023 676 (H) 149 - 390 Thousands/uL Final   02/16/2023 612 (H) 149 - 390 Thousands/uL Final     MCV   Date Value Ref Range Status   03/03/2023 122 (H) 82 - 98 fL Final   02/24/2023 118 (H) 82 - 98 fL Final   02/16/2023 118 (H) 82 - 98 fL Final      Potassium   Date Value Ref Range Status   02/24/2023 3 3 (L) 3 5 - 5 3 mmol/L Final   12/12/2022 3 7 3 5 - 5 3 mmol/L Final   11/28/2022 3 6 3 5 - 5 3 mmol/L Final     Chloride   Date Value Ref Range Status   02/24/2023 105 96 - 108 mmol/L Final   12/12/2022 106 96 - 108 mmol/L Final   11/28/2022 106 96 - 108 mmol/L Final     CO2   Date Value Ref Range Status   02/24/2023 29 21 - 32 mmol/L Final   12/12/2022 30 21 - 32 mmol/L Final   11/28/2022 28 21 - 32 mmol/L Final     CO2, i-STAT   Date Value Ref Range Status   05/18/2017 26 21 - 32 mmol/L Final     BUN   Date Value Ref Range Status   02/24/2023 28 (H) 5 - 25 mg/dL Final   12/12/2022 21 5 - 25 mg/dL Final   11/28/2022 22 5 - 25 mg/dL Final     Creatinine   Date Value Ref Range Status   02/24/2023 1 40 (H) 0 60 - 1 30 mg/dL Final     Comment:     Standardized to IDMS reference method   12/12/2022 1 21 0 60 - 1 30 mg/dL Final     Comment: Standardized to IDMS reference method   11/28/2022 1 44 (H) 0 60 - 1 30 mg/dL Final     Comment:     Standardized to IDMS reference method     Glucose   Date Value Ref Range Status   02/24/2023 141 (H) 65 - 140 mg/dL Final     Comment:     If the patient is fasting, the ADA then defines impaired fasting glucose as > 100 mg/dL and diabetes as > or equal to 123 mg/dL  Specimen collection should occur prior to Sulfasalazine administration due to the potential for falsely depressed results  Specimen collection should occur prior to Sulfapyridine administration due to the potential for falsely elevated results  10/05/2022 88 65 - 140 mg/dL Final     Comment:     If the patient is fasting, the ADA then defines impaired fasting glucose as > 100 mg/dL and diabetes as > or equal to 123 mg/dL  Specimen collection should occur prior to Sulfasalazine administration due to the potential for falsely depressed results  Specimen collection should occur prior to Sulfapyridine administration due to the potential for falsely elevated results  09/07/2022 134 65 - 140 mg/dL Final     Comment:     If the patient is fasting, the ADA then defines impaired fasting glucose as > 100 mg/dL and diabetes as > or equal to 123 mg/dL  Specimen collection should occur prior to Sulfasalazine administration due to the potential for falsely depressed results  Specimen collection should occur prior to Sulfapyridine administration due to the potential for falsely elevated results       Calcium   Date Value Ref Range Status   02/24/2023 9 8 8 3 - 10 1 mg/dL Final   12/12/2022 10 0 8 3 - 10 1 mg/dL Final   11/28/2022 9 7 8 3 - 10 1 mg/dL Final     Albumin   Date Value Ref Range Status   02/24/2023 4 0 3 5 - 5 0 g/dL Final   12/12/2022 4 0 3 5 - 5 0 g/dL Final   11/28/2022 3 3 (L) 3 5 - 5 0 g/dL Final     Total Bilirubin   Date Value Ref Range Status   02/24/2023 1 63 (H) 0 20 - 1 00 mg/dL Final     Comment:     Use of this assay is not recommended for patients undergoing treatment with eltrombopag due to the potential for falsely elevated results  12/12/2022 1 27 (H) 0 20 - 1 00 mg/dL Final     Comment:     Use of this assay is not recommended for patients undergoing treatment with eltrombopag due to the potential for falsely elevated results  11/28/2022 0 56 0 20 - 1 00 mg/dL Final     Comment:     Use of this assay is not recommended for patients undergoing treatment with eltrombopag due to the potential for falsely elevated results  Alkaline Phosphatase   Date Value Ref Range Status   02/24/2023 92 46 - 116 U/L Final   12/12/2022 69 46 - 116 U/L Final   11/28/2022 62 46 - 116 U/L Final     AST   Date Value Ref Range Status   02/24/2023 30 5 - 45 U/L Final     Comment:     Specimen collection should occur prior to Sulfasalazine administration due to the potential for falsely depressed results  12/12/2022 36 5 - 45 U/L Final     Comment:     Specimen collection should occur prior to Sulfasalazine administration due to the potential for falsely depressed results  11/28/2022 22 5 - 45 U/L Final     Comment:     Specimen collection should occur prior to Sulfasalazine administration due to the potential for falsely depressed results  ALT   Date Value Ref Range Status   02/24/2023 44 12 - 78 U/L Final     Comment:     Specimen collection should occur prior to Sulfasalazine and/or Sulfapyridine administration due to the potential for falsely depressed results  12/12/2022 48 12 - 78 U/L Final     Comment:     Specimen collection should occur prior to Sulfasalazine and/or Sulfapyridine administration due to the potential for falsely depressed results  11/28/2022 31 12 - 78 U/L Final     Comment:     Specimen collection should occur prior to Sulfasalazine and/or Sulfapyridine administration due to the potential for falsely depressed results         LD   Date Value Ref Range Status   12/12/2022 331 (H) 81 - 234 U/L Final   11/28/2022 262 (H) 81 - 234 U/L Final   10/21/2022 236 (H) 81 - 234 U/L Final     No results found for: TSH  No results found for: [id number]   Free T4   Date Value Ref Range Status   11/28/2022 0 66 (L) 0 76 - 1 46 ng/dL Final     Comment:     Specimen collection should occur prior to Sulfasalazine administration due to the potential for falsely elevated results  RECENT IMAGING:  No results found  Assessment     Assessment/Plan  No problem-specific Assessment & Plan notes found for this encounter      1  Malignant melanoma of leg, right ([facility] )  2  Metastatic melanoma ([facility] [facility] )    Patient with worsening disease  Numerous metastasis to the brain, lungs, retroperitoneum, and bones  Will re-attempt  encorafenib and binimetinib  Will hold off on pembrolizumab at this time considering his history of hemolytic anemia on retuximab  - Ambulatory Referral to Interventional Radiology; Future        No follow-ups on file  Attestation signed by Yovany Jaimes MD at 3/8/2023  2:21 PM:  I interviewed, took the history and examined the patient  I discussed the case with the Resident and reviewed the Resident’s note , prescribed medications, and orders placed  I supervised the Resident and I agree with the Resident management plan as it was presented to me  I was present in the clinic and examined the patient  Mr Rebecca Hernandez is a 77-year-old gentleman with 3C melanoma and a history of hemolytic anemia requiring rituximab here for follow-up surveillance  He has recent imaging that demonstrates concern for widespread diffuse metastatic melanoma including brain metastases  There was some brain FDG activity that was noted on his imaging and he underwent brain MRI on Saturday which demonstrates innumerable, too many to count, brain metastases  He is doing okay  Does describe having some dizziness at times with change of position minimally  No other focal weaknesses  No neurological deficits    He is having headaches as well for the past 3 weeks  I had started him on dexamethasone over the weekend after receiving results from his brain MRI  ECOG PS 0  No focal neurological deficits  No concerning skin lesions  No lymphadenopathy  No abdominal pain  No shortness of breath  Regular rate and rhythm  We discussed the various treatment options given the assumption that this is metastatic melanoma  We did discuss that I will put him in for IR guided biopsy to establish official diagnosis  However given his high risk of recurrence, his immunosuppression due to rituximab for hemolytic anemia, and prednisone, I am concerned that this is melanoma  We placed referral for radiation oncology given his brain metastases  We also discussed systemically different treatment options  He has a history of hemolytic anemia, so it is less favorable to treat him with immunotherapy  I did reach out to discuss with Dr Helga Guerrero potential for treating with immunotherapy if needed  He did state that obviously if we needed to do that we could and we would deal with his hemolytic anemia  I discussed this with Mr Fabricio Rain as well  We discussed retreatment with BRAF MEK inhibitors  He could not tolerate them in the adjuvant setting, but given the fact that his disease is metastatic, he is willing to try them again  We also discussed treatment with chemotherapy, including temozolomide given its ability for blood-brain barrier penetration  He has the encorafenib and binimetinib at home and will restart this tomorrow  We will proceed with all other work-up including radiation oncology consult as well as IR guided biopsy  I did discuss that he will have to hold BRAF MEK inhibitors around the time of radiation  I discussed given the diffuse nature and innumerable brain metastases, he will require whole brain radiation  We can hold encorafenib and binimetinib during that time       We discussed the side effects of the combination of the inhibitors which includes but is not limited to rash, migratory arthralgias and myalgias, GI distress, LFT abnormalities, potential for development of SCC, and cardiac toxicity  We discussed that there are a few companies that make these drug combination and that unique side effects included extreme photosensitivity, pyrexia/fevers, and ocular toxicity for each of the different combinations  He knows to call with issues or concerns anytime      Herminia Chirinos MD 03/08/23

## 2023-03-06 NOTE — PROGRESS NOTES
[facility] HEMATOLOGY ONCOLOGY SPECIALISTS [address]  [facility]eline [facility] [address]  [phone]     Date of Visit: 3/6/2023  Name: Amber Phillips   YOB: 1955     Subjective    Subjective    VISIT DIAGNOSIS:  Diagnoses and all orders for this visit:    Personal history of malignant melanoma    Malignant melanoma of leg, right [facility])  -     Ambulatory Referral to Interventional Radiology; Future    Metastatic melanoma ([facility] [address] )  -     Ambulatory Referral to Interventional Radiology; Future        Oncology History   Personal history of malignant melanoma   5/31/2022 Biopsy    Right Leg, Shave Biopsy   Melanoma extending to the deep specimen margin  Thickness: 7 0mm  Ulceration: not seen   Mitoses: 3      5/31/2022 -  Cancer Staged    Staging form: Melanoma of the Skin, AJCC 8th Edition  - Clinical stage from 5/31/2022: Stage IIB (cT4a, cN0, cM0) - Signed by Jean-Pierre Rubi MD on 8/25/2022 7/1/2022 Initial Diagnosis    Malignant melanoma of leg, right ([facility] [address] )     7/29/2022 Surgery    A  Lymph node, sentinel, #1:  One lymph node with rare metastatic melanoma cells (1/1), subcapsular location  Immunohistochemical study for MART-1, HMB45 and S100 supports the findings  B  Leg, right, knee, wide excision:  Residual melanoma, nodular type, and scar; margins free  See synoptic report       7/29/2022 -  Cancer Staged    Staging form: Melanoma of the Skin, AJCC 8th Edition  - Pathologic stage from 7/29/2022: Stage IIIC (pT4a, pN1a, cM0) - Signed by Jean-Pierre Rubi MD on 8/25/2022          Cancer Staging   Personal history of malignant melanoma  Staging form: Melanoma of the Skin, AJCC 8th Edition  - Clinical stage from 5/31/2022: Stage IIB (cT4a, cN0, cM0) - Signed by Jean-Pierre Rubi MD on 8/25/2022  - Pathologic stage from 7/29/2022: Stage IIIC (pT4a, pN1a, cM0) - Signed by Jean-Pierre Rubi MD on 8/25/2022     Treatment Details   Treatment goal [No plan goal]   Plan Name ONE-TIME BLOOD PRODUCT TRANSFUSION PLAN   Status Inactive   Start Date 7/15/2022   End Date 7/15/2022   Provider VISHNU Rivera   Chemotherapy [No matching medication found in this treatment plan]     Treatment Details   Treatment goal Palliative   Plan Name OP RiTUXimab weekly x 4, every 6 months   Status Active   Start Date 8/1/2022   End Date 8/23/2022   Provider Marlen Webster, DO   Chemotherapy riTUXimab (RITUXAN) subsequent titrated chemo infusion, 375 mg/m2 = 746 2 mg, Intravenous, Once, 1 of 1 cycle  Administration: 746 2 mg (8/9/2022), 746 2 mg (8/16/2022), 746 2 mg (8/23/2022)    riTUXimab (RITUXAN) first titrated chemo infusion, 375 mg/m2 = 746 2 mg, Intravenous, Once, 1 of 1 cycle  Administration: 746 2 mg (8/1/2022)          HISTORY OF PRESENT ILLNESS: Arvin Bhandari is a 75 y o  male  who who has stage IV metastatic melanoma here for follow-up  Recent MRI and NM PET CT showing findings concerning for widespread disease recurrence  Multiple foci suspicious for brain metastasis, pulmonary nodules, retroperitoneal nodules, iliac chains, and bone mets  Attempted treatment with adjuvant therapy with encorafenib and binimetinib previously  However could not tolerate side effects from medications  They made him feel unwell  He stated he did not wish to feel like that  With changes in recent imaging findings, patient is motivated to retry encorafenib and binimetinib regardless of prior symptoms          REVIEW OF SYSTEMS:  Review of Systems   Constitutional: Negative for chills, fatigue and fever  HENT:   Negative for lump/mass  Eyes: Positive for eye problems (blurry vision left eye)  Respiratory: Negative for cough and shortness of breath  Cardiovascular: Positive for palpitations  Negative for chest pain and leg swelling  Gastrointestinal: Negative for abdominal pain, diarrhea, nausea and vomiting  Genitourinary: Negative for dysuria, frequency and hematuria  Musculoskeletal: Positive for gait problem  Negative for arthralgias and myalgias  Skin: Negative for rash  Neurological: Positive for dizziness, gait problem and headaches  Negative for extremity weakness, light-headedness, numbness, seizures and speech difficulty  Hematological: Negative for adenopathy  Does not bruise/bleed easily  Psychiatric/Behavioral: Negative for depression  The patient is not nervous/anxious           MEDICATIONS:    Current Outpatient Medications:   •  acetaminophen (TYLENOL) 325 mg tablet, Take 2 tablets (650 mg total) by mouth every 6 (six) hours as needed for mild pain, Disp:  , Rfl: 0  •  ALPRAZolam (XANAX) 0 5 mg tablet, Take 1 tablet (0 5 mg total) by mouth 2 (two) times a day Take one tablet one hour before scan and bring the second one with you to take right before the MRI if needed, Disp: 2 tablet, Rfl: 0  •  dabigatran etexilate (PRADAXA) 150 mg capsu, Take 1 capsule (150 mg total) by mouth every 12 (twelve) hours, Disp: 60 capsule, Rfl: 0  •  dexamethasone (DECADRON) 4 mg tablet, Take 1 tablet (4 mg total) by mouth every 6 (six) hours Please make sure you're on Protonix 40 mg daily for GI prophylaxis, Disp: 28 tablet, Rfl: 0  •  hydrochlorothiazide (HYDRODIURIL) 25 mg tablet, Take 1 tablet (25 mg total) by mouth daily (Patient taking differently: Take 25 mg by mouth every morning), Disp: 30 tablet, Rfl: 5  •  metoprolol succinate (TOPROL-XL) 25 mg 24 hr tablet, Take 0 5 tablets (12 5 mg total) by mouth daily (Patient taking differently: Take 12 5 mg by mouth every morning), Disp: 30 tablet, Rfl: 5  •  ondansetron (ZOFRAN) 4 mg tablet, Take 1 tablet (4 mg total) by mouth every 8 (eight) hours as needed for nausea or vomiting, Disp: 20 tablet, Rfl: 0  •  pantoprazole (PROTONIX) 40 mg tablet, Take 1 tablet (40 mg total) by mouth daily (Patient taking differently: Take 40 mg by mouth every morning), Disp: 90 tablet, Rfl: 3  •  pantoprazole (PROTONIX) 40 mg tablet, Take 1 tablet (40 mg total) by mouth daily, Disp: 30 tablet, Rfl: 1  •  potassium chloride (K-DUR,KLOR-CON) 20 mEq tablet, Take 1 tablet (20 mEq total) by mouth daily (Patient taking differently: Take 20 mEq by mouth every morning), Disp: 30 tablet, Rfl: 3  •  prazosin (MINIPRESS) 1 mg capsule, Take 1 mg by mouth daily at bedtime , Disp: , Rfl:   •  predniSONE 20 mg tablet, Take 1 tablet (20 mg total) by mouth daily, Disp: 30 tablet, Rfl: 1  •  VITAMIN D PO, Take 1,000 Units by mouth daily , Disp: , Rfl:   •  ascorbic acid (VITAMIN C) 1000 MG tablet, Take 1 tablet (1,000 mg total) by mouth every 12 (twelve) hours for 6 doses (Patient taking differently: Take 1,000 mg by mouth daily Every other day), Disp: 6 tablet, Rfl: 0  •  benzonatate (TESSALON PERLES) 100 mg capsule, Take 1 capsule (100 mg total) by mouth 3 (three) times a day (Patient not taking: Reported on 12/20/2022), Disp: 20 capsule, Rfl: 0  •  furosemide (LASIX) 20 mg tablet, Take 1 tablet (20 mg total) by mouth daily (Patient not taking: Reported on 12/20/2022), Disp: 5 tablet, Rfl: 0  •  sodium chloride, PF, 0 9 %, 10 mL by Intracatheter route daily Intracatheter flushing daily   May substitute prefilled syringe with normal saline 10 mL vials, 10 mL syringes, and 18 g blunt needles, Disp: 300 mL, Rfl: 0  •  sulfamethoxazole-trimethoprim (BACTRIM DS) 800-160 mg per tablet, Take 1 tablet by mouth 3 (three) times a week Monday, Wednesday and Friday (Patient not taking: Reported on 12/20/2022), Disp: 12 tablet, Rfl: 0     ALLERGIES:  No Known Allergies     ACTIVE PROBLEMS:  Patient Active Problem List   Diagnosis   • 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   • Splenomegaly   • Iron overload due to repeated red blood cell transfusions   • Posttraumatic stress disorder   • Essential hypertension   • Glucose intolerance (impaired glucose tolerance)   • Renal insufficiency   • Auto immune neutropenia (HCC)   • Primary osteoarthritis of left knee   • Pain in joint, lower leg   • Pain in left knee   • COVID-19   • Thrombocytosis   • Primary localized osteoarthrosis of the knee, right   • Hyperglycemia   • Chronic bilateral low back pain with bilateral sciatica   • Hypercholesterolemia   • Personal history of malignant melanoma   • Dyspnea   • Acute respiratory failure with hypoxia (HCC)   • Elevated serum creatinine   • Elevated bilirubin   • Leukocytosis   • Lymphocele after surgical procedure   • Encounter for follow-up surveillance of melanoma          PAST MEDICAL HISTORY:   Past Medical History:   Diagnosis Date   • Anemia 11/02/2016   • Anxiety    • Arthritis    • Autoimmune hemolytic anemia (HCC)    • Claustrophobia    • COVID 12/2020   • DVT (deep venous thrombosis) (HCC)    • GERD (gastroesophageal reflux disease)    • Hearing loss, right    • Hemolytic anemia (HCC)    • History of transfusion     2018 - no adverse reaction   • Hypertension    • Malignant melanoma of leg, right (HCC) 07/01/2022   • Palpitation    • Portal vein thrombosis    • PTSD (post-traumatic stress disorder)    • Pulmonary emboli (HCC)    • Squamous cell skin cancer 12/20/2022    SCCIS- 12/6/22   • Tobacco abuse         PAST SURGICAL HISTORY:  Past Surgical History:   Procedure Laterality Date   • CATARACT EXTRACTION Bilateral    • CHOLECYSTECTOMY  07/18/2017   • COLONOSCOPY     • ELBOW ARTHROPLASTY Left     bursectomy   • IR DRAINAGE TUBE CHECK WITH SCLEROSIS  10/06/2022   • IR DRAINAGE TUBE CHECK WITH SCLEROSIS  10/10/2022   • IR DRAINAGE TUBE CHECK WITH SCLEROSIS  10/20/2022   • IR DRAINAGE TUBE CHECK WITH SCLEROSIS  10/27/2022   • IR DRAINAGE TUBE CHECK WITH SCLEROSIS 11/04/2022   • IR DRAINAGE TUBE CHECK WITH SCLEROSIS  11/15/2022   • IR DRAINAGE TUBE CHECK WITH SCLEROSIS  11/25/2022   • IR DRAINAGE TUBE PLACEMENT  09/19/2022   • JOINT REPLACEMENT Right 02/02/2021    knee   • KNEE SURGERY Right     meniscus tear   • LYMPH NODE BIOPSY Right 07/29/2022    Procedure: BIOPSY LYMPH NODE SENTINEL;  Surgeon: Geena Reed MD;  [facility];  Service: Surgical Oncology   • MOHS SURGERY Right 12/20/2022    Right dorsal hand SCCLEFTY-Dr Isabel   • WA ARTHRP KNE CONDYLE&PLATU MEDIAL&LAT COMPARTMENTS Right 02/02/2021    Procedure: ARTHROPLASTY KNEE TOTAL;  Surgeon: Sakshi Zuniga MD;  [facility];  Service: Orthopedics   • WA ARTHRP KNE CONDYLE&PLATU MEDIAL&LAT COMPARTMENTS Left 11/17/2021    Procedure: TOTAL KNEE REPLACEMENT;  Surgeon: Sakshi Zuniga MD;  [facility];  Service: Orthopedics   • WA LAPS SURG CHOLECYSTECTOMY Iline Dage N/A 12/23/2017    Procedure: CHOLECYSTECTOMY LAPAROSCOPIC with cholangiogram;  Surgeon: Jose Christine MD;  [facility];  Service: General   • WA SPLENECTOMY TOTAL SEPARATE PROCEDURE N/A 05/18/2017    Procedure: LAPAROSCOPIC HAND ASSIST SPLENECTOMY;  Surgeon: Waqas Maher MD;  [facility];  Service: Surgical Oncology   • SHOULDER SURGERY Left     rotator cuff x4, reconstruction   • SKIN BIOPSY  5 May 2022   • SKIN CANCER EXCISION  29 July 2022   • SKIN LESION EXCISION Right 07/29/2022    Procedure: WIDE EXCISION RIGHT MEDIAL THIGH;  Surgeon: Geena Reed MD;  Location: [facility];  Service: Surgical Oncology        SOCIAL HISTORY:  Social History     Socioeconomic History   • Marital status: /Civil Union     Spouse name: None   • Number of children: None   • Years of education: None   • Highest education level: None   Occupational History   • None   Tobacco Use   • Smoking status: Some Days     Packs/day: 0 00     Years: 5 00     Pack years: 0 00     Types: Cigars, Cigarettes   • Smokeless tobacco: Current     Types: Snuff   • Tobacco comments:     5  a week average   Vaping Use   • Vaping Use: Never used   Substance and Sexual Activity   • Alcohol use: Yes     Alcohol/week: 6 0 standard drinks     Types: 6 Cans of beer per week     Comment: socially   • Drug use: Yes     Types: Marijuana     Comment: medical marijuana   • Sexual activity: Yes     Partners: Female     Birth control/protection: None   Other Topics Concern   • None   Social History Narrative    Daily coffee consumption (2 cups/day)    reitred     Social Determinants of Health     Financial Resource Strain: Not on file   Food Insecurity: No Food Insecurity   • Worried About Running Out of Food in the Last Year: Never true   • Ran Out of Food in the Last Year: Never true   Transportation Needs: No Transportation Needs   • Lack of Transportation (Medical): No   • Lack of Transportation (Non-Medical): No   Physical Activity: Not on file   Stress: Not on file   Social Connections: Not on file   Intimate Partner Violence: Not on file   Housing Stability: Low Risk    • Unable to Pay for Housing in the Last Year: No   • Number of Places Lived in the Last Year: 1   • Unstable Housing in the Last Year: No        FAMILY HISTORY:  Family History   Problem Relation Age of Onset   • Cancer Mother    • Breast cancer Mother    • Other Father         CABG   • Heart attack Paternal Grandfather         acute MI        Objective    Objective    PHYSICAL EXAMINATION:   Blood pressure 126/60, pulse 92, temperature 98 °F (36 7 °C), temperature source Temporal, resp  rate 16, height 5' 8" (1 727 m), weight 90 7 kg (200 lb), SpO2 98 %  Pain Score: 0-No pain      Physical Exam    I reviewed lab data in the chart      WBC   Date Value Ref Range Status   03/03/2023 19 69 (H) 4 31 - 10 16 Thousand/uL Final   02/24/2023 19 27 (H) 4 31 - 10 16 Thousand/uL Final   02/16/2023 14 97 (H) 4 31 - 10 16 Thousand/uL Final     Hemoglobin   Date Value Ref Range Status 03/03/2023 11 6 (L) 12 0 - 17 0 g/dL Final   02/24/2023 12 0 12 0 - 17 0 g/dL Final   02/16/2023 11 8 (L) 12 0 - 17 0 g/dL Final     Platelets   Date Value Ref Range Status   03/03/2023 723 (H) 149 - 390 Thousands/uL Final   02/24/2023 676 (H) 149 - 390 Thousands/uL Final   02/16/2023 612 (H) 149 - 390 Thousands/uL Final     MCV   Date Value Ref Range Status   03/03/2023 122 (H) 82 - 98 fL Final   02/24/2023 118 (H) 82 - 98 fL Final   02/16/2023 118 (H) 82 - 98 fL Final      Potassium   Date Value Ref Range Status   02/24/2023 3 3 (L) 3 5 - 5 3 mmol/L Final   12/12/2022 3 7 3 5 - 5 3 mmol/L Final   11/28/2022 3 6 3 5 - 5 3 mmol/L Final     Chloride   Date Value Ref Range Status   02/24/2023 105 96 - 108 mmol/L Final   12/12/2022 106 96 - 108 mmol/L Final   11/28/2022 106 96 - 108 mmol/L Final     CO2   Date Value Ref Range Status   02/24/2023 29 21 - 32 mmol/L Final   12/12/2022 30 21 - 32 mmol/L Final   11/28/2022 28 21 - 32 mmol/L Final     CO2, i-STAT   Date Value Ref Range Status   05/18/2017 26 21 - 32 mmol/L Final     BUN   Date Value Ref Range Status   02/24/2023 28 (H) 5 - 25 mg/dL Final   12/12/2022 21 5 - 25 mg/dL Final   11/28/2022 22 5 - 25 mg/dL Final     Creatinine   Date Value Ref Range Status   02/24/2023 1 40 (H) 0 60 - 1 30 mg/dL Final     Comment:     Standardized to IDMS reference method   12/12/2022 1 21 0 60 - 1 30 mg/dL Final     Comment:     Standardized to IDMS reference method   11/28/2022 1 44 (H) 0 60 - 1 30 mg/dL Final     Comment:     Standardized to IDMS reference method     Glucose   Date Value Ref Range Status   02/24/2023 141 (H) 65 - 140 mg/dL Final     Comment:     If the patient is fasting, the ADA then defines impaired fasting glucose as > 100 mg/dL and diabetes as > or equal to 123 mg/dL  Specimen collection should occur prior to Sulfasalazine administration due to the potential for falsely depressed results   Specimen collection should occur prior to Sulfapyridine administration due to the potential for falsely elevated results  10/05/2022 88 65 - 140 mg/dL Final     Comment:     If the patient is fasting, the ADA then defines impaired fasting glucose as > 100 mg/dL and diabetes as > or equal to 123 mg/dL  Specimen collection should occur prior to Sulfasalazine administration due to the potential for falsely depressed results  Specimen collection should occur prior to Sulfapyridine administration due to the potential for falsely elevated results  09/07/2022 134 65 - 140 mg/dL Final     Comment:     If the patient is fasting, the ADA then defines impaired fasting glucose as > 100 mg/dL and diabetes as > or equal to 123 mg/dL  Specimen collection should occur prior to Sulfasalazine administration due to the potential for falsely depressed results  Specimen collection should occur prior to Sulfapyridine administration due to the potential for falsely elevated results  Calcium   Date Value Ref Range Status   02/24/2023 9 8 8 3 - 10 1 mg/dL Final   12/12/2022 10 0 8 3 - 10 1 mg/dL Final   11/28/2022 9 7 8 3 - 10 1 mg/dL Final     Albumin   Date Value Ref Range Status   02/24/2023 4 0 3 5 - 5 0 g/dL Final   12/12/2022 4 0 3 5 - 5 0 g/dL Final   11/28/2022 3 3 (L) 3 5 - 5 0 g/dL Final     Total Bilirubin   Date Value Ref Range Status   02/24/2023 1 63 (H) 0 20 - 1 00 mg/dL Final     Comment:     Use of this assay is not recommended for patients undergoing treatment with eltrombopag due to the potential for falsely elevated results  12/12/2022 1 27 (H) 0 20 - 1 00 mg/dL Final     Comment:     Use of this assay is not recommended for patients undergoing treatment with eltrombopag due to the potential for falsely elevated results  11/28/2022 0 56 0 20 - 1 00 mg/dL Final     Comment:     Use of this assay is not recommended for patients undergoing treatment with eltrombopag due to the potential for falsely elevated results       Alkaline Phosphatase   Date Value Ref Range Status   02/24/2023 92 46 - 116 U/L Final   12/12/2022 69 46 - 116 U/L Final   11/28/2022 62 46 - 116 U/L Final     AST   Date Value Ref Range Status   02/24/2023 30 5 - 45 U/L Final     Comment:     Specimen collection should occur prior to Sulfasalazine administration due to the potential for falsely depressed results  12/12/2022 36 5 - 45 U/L Final     Comment:     Specimen collection should occur prior to Sulfasalazine administration due to the potential for falsely depressed results  11/28/2022 22 5 - 45 U/L Final     Comment:     Specimen collection should occur prior to Sulfasalazine administration due to the potential for falsely depressed results  ALT   Date Value Ref Range Status   02/24/2023 44 12 - 78 U/L Final     Comment:     Specimen collection should occur prior to Sulfasalazine and/or Sulfapyridine administration due to the potential for falsely depressed results  12/12/2022 48 12 - 78 U/L Final     Comment:     Specimen collection should occur prior to Sulfasalazine and/or Sulfapyridine administration due to the potential for falsely depressed results  11/28/2022 31 12 - 78 U/L Final     Comment:     Specimen collection should occur prior to Sulfasalazine and/or Sulfapyridine administration due to the potential for falsely depressed results  LD   Date Value Ref Range Status   12/12/2022 331 (H) 81 - 234 U/L Final   11/28/2022 262 (H) 81 - 234 U/L Final   10/21/2022 236 (H) 81 - 234 U/L Final     No results found for: TSH  No results found for: [id number]   Free T4   Date Value Ref Range Status   11/28/2022 0 66 (L) 0 76 - 1 46 ng/dL Final     Comment:     Specimen collection should occur prior to Sulfasalazine administration due to the potential for falsely elevated results  RECENT IMAGING:  No results found  Assessment    Assessment/Plan  No problem-specific Assessment & Plan notes found for this encounter      1  Malignant melanoma of leg, right ([facility] )  2  Metastatic melanoma ([facility] )    Patient with worsening disease  Numerous metastasis to the brain, lungs, retroperitoneum, and bones  Will re-attempt  encorafenib and binimetinib  Will hold off on pembrolizumab at this time considering his history of hemolytic anemia on retuximab  - Ambulatory Referral to Interventional Radiology; Future        No follow-ups on file

## 2023-03-07 ENCOUNTER — OFFICE VISIT (OUTPATIENT)
Dept: DERMATOLOGY | Facility: CLINIC | Age: 68
End: 2023-03-07

## 2023-03-07 ENCOUNTER — CLINICAL SUPPORT (OUTPATIENT)
Dept: RADIATION ONCOLOGY | Facility: CLINIC | Age: 68
End: 2023-03-07
Attending: INTERNAL MEDICINE

## 2023-03-07 ENCOUNTER — PREP FOR PROCEDURE (OUTPATIENT)
Dept: INTERVENTIONAL RADIOLOGY/VASCULAR | Facility: CLINIC | Age: 68
End: 2023-03-07

## 2023-03-07 ENCOUNTER — RADIATION ONCOLOGY CONSULT (OUTPATIENT)
Dept: RADIATION ONCOLOGY | Facility: CLINIC | Age: 68
End: 2023-03-07
Attending: INTERNAL MEDICINE

## 2023-03-07 VITALS
HEART RATE: 80 BPM | SYSTOLIC BLOOD PRESSURE: 120 MMHG | RESPIRATION RATE: 20 BRPM | HEIGHT: 68 IN | WEIGHT: 198.63 LBS | BODY MASS INDEX: 30.1 KG/M2 | DIASTOLIC BLOOD PRESSURE: 84 MMHG | OXYGEN SATURATION: 95 % | TEMPERATURE: 96.8 F

## 2023-03-07 VITALS — WEIGHT: 199 LBS | TEMPERATURE: 97.9 F | HEIGHT: 68 IN | BODY MASS INDEX: 30.16 KG/M2

## 2023-03-07 DIAGNOSIS — L82.1 SEBORRHEIC KERATOSIS: ICD-10-CM

## 2023-03-07 DIAGNOSIS — C79.31 METASTASIS TO BRAIN (HCC): ICD-10-CM

## 2023-03-07 DIAGNOSIS — D22.9 MULTIPLE NEVI: ICD-10-CM

## 2023-03-07 DIAGNOSIS — Z85.820 PERSONAL HISTORY OF MALIGNANT MELANOMA: Primary | ICD-10-CM

## 2023-03-07 DIAGNOSIS — Z85.820 HISTORY OF MELANOMA: ICD-10-CM

## 2023-03-07 DIAGNOSIS — L81.4 LENTIGO: ICD-10-CM

## 2023-03-07 DIAGNOSIS — L57.0 AK (ACTINIC KERATOSIS): Primary | ICD-10-CM

## 2023-03-07 DIAGNOSIS — C43.9 METASTATIC MELANOMA (HCC): ICD-10-CM

## 2023-03-07 DIAGNOSIS — D18.01 CHERRY ANGIOMA: ICD-10-CM

## 2023-03-07 DIAGNOSIS — C43.9 MALIGNANT MELANOMA, UNSPECIFIED SITE (HCC): Primary | ICD-10-CM

## 2023-03-07 DIAGNOSIS — C43.9 METASTATIC MELANOMA (HCC): Primary | ICD-10-CM

## 2023-03-07 RX ORDER — MEMANTINE HYDROCHLORIDE 5 MG-10 MG
KIT ORAL
Qty: 49 TABLET | Refills: 0 | Status: SHIPPED | OUTPATIENT
Start: 2023-03-07 | End: 2023-03-07 | Stop reason: CLARIF

## 2023-03-07 RX ORDER — MEMANTINE HYDROCHLORIDE 5 MG-10 MG
KIT ORAL
Qty: 49 TABLET | Refills: 0 | Status: SHIPPED | OUTPATIENT
Start: 2023-03-07

## 2023-03-07 RX ORDER — MEMANTINE HYDROCHLORIDE 5 MG-10 MG
KIT ORAL
Qty: 49 TABLET | Refills: 0 | Status: CANCELLED | OUTPATIENT
Start: 2023-03-07

## 2023-03-07 NOTE — PATIENT INSTRUCTIONS
HISTORY OF MELANOMA    Assessment and Plan:  Based on a thorough discussion of this condition and the management approach to it (including a comprehensive discussion of the known risks, side effects and potential benefits of treatment), the patient (family) agrees to implement the following specific plan: Will monitor for recurrence; scar appears well healed with NER and no LAD on exam  Recommend skin exam in 3 months  Advised dental and eye exam annually  Continue visits with Dr Taryn Martinez as needed    What happens at follow-up? The main purpose of follow-up is to detect recurrences early (metastatic melanoma), but it also offers an opportunity to diagnose a new primary melanoma at the first possible opportunity  A second invasive melanoma occurs in 5-10% of melanoma patients and a new melanoma in situ is diagnosed in more than 20% of melanoma patients  Our practice makes the following recommendations for follow-up for patients with invasive melanoma  At-least "monthly" self-skin examinations   Routine skin checks by a board certified dermatologist  Follow-up intervals are "every 3 months" within 2 years of a new melanoma diagnosis; "every 6 months" between 2-4 years of a new melanoma diagnosis; and "annually" after 4 years of a new melanoma diagnosis  Individual patient's needs should be considered before an appropriate follow-up is offered  Provide education and support to help the patient adjust to their illness    Follow-up appointments should include:  A check of the scar where the primary melanoma was removed  Checking the regional lymph nodes  A general skin examination  A full physical examination at least annually by your primary care physician    In those with more advanced primary disease, follow-up may include:  Blood tests  Imaging: ultrasound, X-ray, CT, MRI and PET scan      Most tests are not worthwhile for patients with stage 1 or 2 melanoma unless there are signs or symptoms of disease recurrence or metastasis  No tests are necessary for healthy patients who have remained well for five years or longer after removal of their melanoma  What is the outlook for patients with melanoma? Melanoma in situ is cured by excision because it has no potential to spread around the body  The risk of spread and ultimate death from invasive melanoma depends on several factors, but the main one is the Breslow thickness of the melanoma at the time it was surgically removed  Metastases are rare for melanomas < 0 75 mm and the risk for tumours 0 75-1 mm thick is about 5%  The risk steadily increases with thickness so that melanomas > 4 mm have a risk of metastasis of about 40%  Melanoma is a potentially serious type of skin cancer, in which there is uncontrolled growth of melanocytes (pigment cells)  Melanoma is sometimes called malignant melanoma  Normal melanocytes are found in the basal layer of the epidermis (the outer layer of skin)  Melanocytes produce a protein called melanin, which protects skin cells by absorbing ultraviolet (UV) radiation  Melanocytes are found in equal numbers in black and white skin, but melanocytes in black skin produce much more melanin  People with dark brown or black skin are very much less likely to be damaged by UV radiation than those with white skin  MELANOCYTIC NEVI ("Moles")      Assessment and Plan:  Based on a thorough discussion of this condition and the management approach to it (including a comprehensive discussion of the known risks, side effects and potential benefits of treatment), the patient (family) agrees to implement the following specific plan:  The patient was encouraged to use an SPF30+ broad spectrum sunscreen daily and re-apply every 2-3 outdoors while outside  The importance of sun protection, self-skin exams, and sun avoidance was emphasized   An annual full body skin exam is recommended, and the patient was encouraged to return to the office sooner for any new or changing lesions of concerns  Benign, reassured  Annual skin check     Melanocytic Nevi  Melanocytic nevi ("moles") are tan or brown, raised or flat areas of the skin which have an increased number of melanocytes  Melanocytes are the cells in our body which make pigment and account for skin color  Some moles are present at birth (I e , "congenital nevi"), while others come up later in life (i e , "acquired nevi")  The sun can stimulate the body to make more moles  Sunburns are not the only thing that triggers more moles  Chronic sun exposure can do it too  Clinically distinguishing a healthy mole from melanoma may be difficult, even for experienced dermatologists  The "ABCDE's" of moles have been suggested as a means of helping to alert a person to a suspicious mole and the possible increased risk of melanoma  The suggestions for raising alert are as follows:    Asymmetry: Healthy moles tend to be symmetric, while melanomas are often asymmetric  Asymmetry means if you draw a line through the mole, the two halves do not match in color, size, shape, or surface texture  Asymmetry can be a result of rapid enlargement of a mole, the development of a raised area on a previously flat lesion, scaling, ulceration, bleeding or scabbing within the mole  Any mole that starts to demonstrate "asymmetry" should be examined promptly by a board certified dermatologist      Border: Healthy moles tend to have discrete, even borders  The border of a melanoma often blends into the normal skin and does not sharply delineate the mole from normal skin  Any mole that starts to demonstrate "uneven borders" should be examined promptly by a board certified dermatologist      Color: Healthy moles tend to be one color throughout  Melanomas tend to be made up of different colors ranging from dark black, blue, white, or red    Any mole that demonstrates a color change should be examined promptly by a board certified dermatologist      Diameter: Healthy moles tend to be smaller than 0 6 cm in size; an exception are "congenital nevi" that can be larger  Melanomas tend to grow and can often be greater than 0 6 cm (1/4 of an inch, or the size of a pencil eraser)  This is only a guideline, and many normal moles may be larger than 0 6 cm without being unhealthy  Any mole that starts to change in size (small to bigger or bigger to smaller) should be examined promptly by a board certified dermatologist      Evolving: Healthy moles tend to "stay the same "  Melanomas may often show signs of change or evolution such as a change in size, shape, color, or elevation  Any mole that starts to itch, bleed, crust, burn, hurt, or ulcerate or demonstrate a change or evolution should be examined promptly by a board certified dermatologist         Mendel Eis and Plan:  Based on a thorough discussion of this condition and the management approach to it (including a comprehensive discussion of the known risks, side effects and potential benefits of treatment), the patient (family) agrees to implement the following specific plan:  When outside we recommend using a wide brim hat, sunglasses, long sleeve and pants, sunscreen with SPF 12+ with reapplication every 2 hours, or SPF specific clothing       What is a lentigo? A lentigo is a pigmented flat or slightly raised lesion with a clearly defined edge  Unlike an ephelis (freckle), it does not fade in the winter months  There are several kinds of lentigo  The name lentigo originally referred to its appearance resembling a small lentil  The plural of lentigo is lentigines, although “lentigos” is also in common use  Who gets lentigines? Lentigines can affect males and females of all ages and races  Solar lentigines are especially prevalent in fair skinned adults  Lentigines associated with syndromes are present at birth or arise during childhood      What causes lentigines? Common forms of lentigo are due to exposure to ultraviolet radiation:  Sun damage including sunburn   Indoor tanning   Phototherapy, especially photochemotherapy (PUVA)    Ionizing radiation, eg radiation therapy, can also cause lentigines  Several familial syndromes associated with widespread lentigines originate from mutations in Nathaniel-MAP kinase, mTOR signaling and PTEN pathways  What is the treatment for lentigines? Most lentigines are left alone  Attempts to lighten them may not be successful  The following approaches are used:  SPF 50+ broad-spectrum sunscreen   Hydroquinone bleaching cream   Alpha hydroxy acids   Vitamin C   Retinoids   Azelaic acid   Chemical peels  Individual lesions can be permanently removed using:  Cryotherapy   Intense pulsed light   Pigment lasers    How can lentigines be prevented? Lentigines associated with exposure ultraviolet radiation can be prevented by very careful sun protection  Clothing is more successful at preventing new lentigines than are sunscreens  What is the outlook for lentigines? Lentigines usually persist  They may increase in number with age and sun exposure  Some in sun-protected sites may fade and disappear  SIMS ANGIOMAS    Assessment and Plan:  Based on a thorough discussion of this condition and the management approach to it (including a comprehensive discussion of the known risks, side effects and potential benefits of treatment), the patient (family) agrees to implement the following specific plan:  Monitor for changes  Benign, reassured    Assessment and Plan:    Cherry angioma, also known as Elizebeth Dulce spots, are benign vascular skin lesions  A "cherry angioma" is a firm red, blue or purple papule, 0 1-1 cm in diameter  When thrombosed, they can appear black in colour until evaluated with a dermatoscope when the red or purple colour is more easily seen   Cherry angioma may develop on any part of the body but most often appear on the scalp, face, lips and trunk  An angioma is due to proliferating endothelial cells; these are the cells that line the inside of a blood vessel  Angiomas can arise in early life or later in life; the most common type of angioma is a cherry angioma  Cherry angiomas are very common in males and females of any age or race  They are more noticeable in white skin than in skin of colour  They markedly increase in number from about the age of 36  There may be a family history of similar lesions  Eruptive cherry angiomas have been rarely reported to be associated with internal malignancy  The cause of angiomas is unknown  Genetic analysis of cherry angiomas has shown that they frequently carry specific somatic missense mutations in the GNAQ and GNA11 (Q209H) genes, which are involved in other vascular and melanocytic proliferations  SEBORRHEIC KERATOSIS; NON-INFLAMED    Assessment and Plan:  Based on a thorough discussion of this condition and the management approach to it (including a comprehensive discussion of the known risks, side effects and potential benefits of treatment), the patient (family) agrees to implement the following specific plan:  Monitor for changes  Benign, reassured      Seborrheic Keratosis  A seborrheic keratosis is a harmless warty spot that appears during adult life as a common sign of skin aging  Seborrheic keratoses can arise on any area of skin, covered or uncovered, with the usual exception of the palms and soles  They do not arise from mucous membranes  Seborrheic keratoses can have highly variable appearance  Seborrheic keratoses are extremely common  It has been estimated that over 90% of adults over the age of 61 years have one or more of them  They occur in males and females of all races, typically beginning to erupt in the 35s or 45s  They are uncommon under the age of 21 years  The precise cause of seborrhoeic keratoses is not known    Seborrhoeic keratoses are considered degenerative in nature  As time goes by, seborrheic keratoses tend to become more numerous  Some people inherit a tendency to develop a very large number of them; some people may have hundreds of them  There is no easy way to remove multiple lesions on a single occasion  Unless a specific lesion is "inflamed" and is causing pain or stinging/burning or is bleeding, most insurance companies do not authorize treatment  ACTINIC KERATOSIS    Assessment and Plan:  Based on a thorough discussion of this condition and the management approach to it (including a comprehensive discussion of the known risks, side effects and potential benefits of treatment), the patient (family) agrees to implement the following specific plan:    Precancerous nature of these lesions reviewed  Risk of progression to [facility] if untreated/unresolved after therapy reviewed  Lesions treated today in the office with liquid nitrogen x 2 cycles per site  Patient counseled to return to the office in 4-6 weeks for recheck if not resolved at which time retreatment of biopsy to rule out SCC will be determined based on clinic findings    Actinic keratoses are very common on sites repeatedly exposed to the sun, especially the backs of the hands and the face, most often affecting the ears, nose, cheeks, upper lip, vermilion of the lower lip, temples, forehead and balding scalp  In severely chronically sun-damaged individuals, they may also be found on the upper trunk, upper and lower limbs, and dorsum of feet  We discussed the theoretical premalignant (“pre-cancerous”) nature and etiology of these growths  We discussed the prevailing notion that actinic keratoses are a reflection of abnormal skin cell development due to DNA damage by short wavelength UVB  They are more likely to appear if the immune function is poor, due to aging, recent sun exposure, predisposing disease or certain drugs      We discussed that the main concern is that actinic keratoses may predispose to squamous cell carcinoma  It is rare for a solitary actinic keratosis to evolve to squamous cell carcinoma (SCC), but the risk of SCC occurring at some stage in a patient with more than 10 actinic keratoses is thought to be about 10 to 15%  A tender, thickened, ulcerated or enlarging actinic keratosis is suspicious of SCC  Actinic keratoses may be prevented by strict sun protection  If already present, keratoses may improve with a very high sun protection factor (50+) broad-spectrum sunscreen applied at least daily to affected areas, year-round  We recommend that UPF-rated clothing and hats and sunglasses be worn whenever possible and that a sunscreen-moisturizer combination product such as Neutrogena Daily Defense be applied at least three times a day  We performed a thorough discussion of treatment options and specific risk/benefits/alternatives including but not limited to medical “field” treatment with medications such as the following:    Topical “field area” medications such as 5-fluorouracil or Aldara (specifically, the trouble with long-term compliance, blistering and local skin reaction versus the convenience of at-home therapy and that field therapy “gets what is not yet seen”)  Cryotherapy (specifically, local pain, scarring, dyspigmentation, blistering, possible superinfection, and treats “only what we see” versus directed treatment today)  Photodynamic therapy (specifically, local pain, scarring, dyspigmentation, blistering, possible superinfection, need to schedule for a later date, and time spent in the office versus field therapy that “gets what is not yet seen”)

## 2023-03-07 NOTE — PROGRESS NOTES
Pretty Paredes 1955 is a 75 y o  male Simone Sargent "Jnue Jansen" Jin Hightower presents for Radiation Oncology Consult  He is referred by [facility]  Dx: Metastatic Melanoma     Pt is a 74 yo male who has stage IV metastatic melanoma  Recent MRI and PET scan showing findings concerning for widespread disease  Multiple foci suspicious for brain metastasis, pulmonary nodules, retroperitoneal nodules, iliac chains, and bone mets  2 14 23 Surg Onc OV   Dr Abdul Libman   Plan:  "Pt is s/p WLE + SLN for T4aN1a melanoma of right knee  Did not receive adjuvant treatment 2/2 side effects  Doing well  Due for PET very soon  Again discussed vigilance about new skin lesions in this region or new lumps/bumps in LN basins  F/up in 6 mons  2 20 23 Cindy Gracia  "S/P 12/20/22 mohs procedure preformed by Dr Jenn Grossman  Patient is pleased with wound healing  Denies fever/chills  Denies pain or irritation  Pt is not limited with movement "  Healing wound, discussed that can hold on further wound care  Wound check prn     3 2 23 PET  IMPRESSION:  1  Findings concerning for widespread disease recurrence  2   Scattered new small foci of radiotracer uptake in the bilateral cerebral hemispheres suspicious for intracranial metastasis  Recommend evaluation with dedicated MRI with and without IV contrast   3   Scattered new subcentimeter pulmonary nodules measuring up to 5 mm  These are not FDG avid but may be too small to characterize by PET  Recommend reassessment on follow-up  4   New mildly FDG avid retroperitoneal nodule just lateral to the right psoas, suspicious for retroperitoneal metastasis  5   Scattered new FDG avid lymph nodes along the proximal iliac chains suspicious for metastasis  6   Multiple new FDG avid right inguinal lymph nodes and subcutaneous nodules extending along the right anteromedial thigh suspicious for metastasis    7   Multiple new FDG avid osseous metastasis      3 4 23 MRI BRAIN  IMPRESSION:  Too numerous to count metastatic lesions in bilateral cerebral hemispheres and to a lesser extent in bilateral cerebellum with perilesional vasogenic edema (bilateral frontal, bilateral parietal, right subinsular, left temporal, and bilateral occipital lobes - worse in right frontal lobe)  The study was marked in [facility] for immediate notification  3 6 23 Farhad Sarah  Plan: Pt with worsening disease as noted in recent PET and MRI  Will re-attempt  encorafenib and binimetinib regardless of prior symptoms  Will hold off on pembrolizumab at this time considering his history of hemolytic anemia on retuximab  Amb referral to IR  Appt    3 7 23    Derm   4 27 23  Med Onc  Dr Bennett Cuadra   8 16 23  Surg Onc  Dr Palma [place]         Oncology History   Personal history of malignant melanoma   5/31/2022 Biopsy    Right Leg, Shave Biopsy   Melanoma extending to the deep specimen margin  Thickness: 7 0mm  Ulceration: not seen   Mitoses: 3      5/31/2022 -  Cancer Staged    Staging form: Melanoma of the Skin, AJCC 8th Edition  - Clinical stage from 5/31/2022: Stage IIB (cT4a, cN0, cM0) - Signed by Jean-Pierre Rubi MD on 8/25/2022 7/1/2022 Initial Diagnosis    Malignant melanoma of leg, right ([address] )     7/29/2022 Surgery    A  Lymph node, sentinel, #1:  One lymph node with rare metastatic melanoma cells (1/1), subcapsular location  Immunohistochemical study for MART-1, HMB45 and S100 supports the findings  B  Leg, right, knee, wide excision:  Residual melanoma, nodular type, and scar; margins free  See synoptic report  7/29/2022 -  Cancer Staged    Staging form: Melanoma of the Skin, AJCC 8th Edition  - Pathologic stage from 7/29/2022: Stage IIIC (pT4a, pN1a, cM0) - Signed by Jean-Pierre Rubi MD on 8/25/2022 12/6/2022 Biopsy    Final Diagnosis   A  Skin, Right dorsal hand, Shave biopsy:  Squamous cell carcinoma in-situ, at least; extending to biopsy margins  Review of Systems:  Review of Systems   Constitutional: Positive for fatigue (6/10)  HENT: Positive for dental problem (Regular dental care ) and hearing loss (Left ear hearing loss )  Eyes:        Cataract Sx--2yrs  Reading glasses    Respiratory: Positive for shortness of breath (Not new )  Cardiovascular: Positive for palpitations (Hx of palpitations/more active past few weeks  )  Gastrointestinal: Negative  Endocrine: Negative  Genitourinary: Negative  Musculoskeletal: Negative  Skin:        Right leg-derm procedure last year  Right hand-MOHS--most recent    Allergic/Immunologic: Negative  Neurological: Positive for dizziness (With standing ) and headaches (Daily )  Hematological: Bruises/bleeds easily (Easy bruising/Bleeding )  Anemia Hx    Psychiatric/Behavioral: Negative  Clinical Trial: no      Pain assessment: 8/10    PFT    Prior Radiation   No prior RT/Chemo in the past?? Rituxan 2022??     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    Implantable Devices [facility], pacemaker, pain stimulator)  No     Hip Replacement  No     Health Maintenance   Topic Date Due   • HIB Vaccine (1 of 1 - Risk 1-dose series) 07/02/1956   • Meningococcal ACWY Vaccine (2 - Risk 2-dose series) 06/30/2017   • PT PLAN OF CARE  12/31/2021   • COVID-19 Vaccine (3 - Booster for Martín series) 03/08/2022   • Fall Risk  12/20/2022   • Medicare Annual Wellness Visit (AWV)  12/20/2022   • BMI: Followup Plan  12/20/2022   • Pneumococcal Vaccine: 65+ Years (3 - PPSV23 if available, else PCV20) 10/15/2023   • Depression Screening  03/07/2024   • BMI: Adult  03/07/2024   • Colorectal Cancer Screening  05/25/2027   • Hepatitis C Screening  Completed   • Influenza Vaccine  Completed   • IPV Vaccine  Aged Out   • Hepatitis A Vaccine  Aged Out   • HPV Vaccine  Aged Out       Past Medical History:   Diagnosis Date   • Anemia 11/02/2016   • Anxiety    • Arthritis    • Autoimmune hemolytic anemia [facility])    • Claustrophobia    • COVID 12/2020   • DVT (deep venous thrombosis) (HCC)    • GERD (gastroesophageal reflux disease)    • Hearing loss, right    • Hemolytic anemia (HCC)    • History of transfusion     2018 - no adverse reaction   • Hypertension    • Malignant melanoma of leg, right ([address] ) 07/01/2022   • Palpitation    • Portal vein thrombosis    • PTSD (post-traumatic stress disorder)    • Pulmonary emboli (HCC)    • Squamous cell skin cancer 12/20/2022    SCCIS- 12/6/22   • Tobacco abuse        Past Surgical History:   Procedure Laterality Date   • CATARACT EXTRACTION Bilateral    • CHOLECYSTECTOMY  07/18/2017   • COLONOSCOPY     • ELBOW ARTHROPLASTY Left     bursectomy   • IR DRAINAGE TUBE CHECK WITH SCLEROSIS  10/06/2022   • IR DRAINAGE TUBE CHECK WITH SCLEROSIS  10/10/2022   • IR DRAINAGE TUBE CHECK WITH SCLEROSIS  10/20/2022   • IR DRAINAGE TUBE CHECK WITH SCLEROSIS  10/27/2022   • IR DRAINAGE TUBE CHECK WITH SCLEROSIS  11/04/2022   • IR DRAINAGE TUBE CHECK WITH SCLEROSIS  11/15/2022   • IR DRAINAGE TUBE CHECK WITH SCLEROSIS  11/25/2022   • IR DRAINAGE TUBE PLACEMENT  09/19/2022   • JOINT REPLACEMENT Right 02/02/2021    knee   • KNEE SURGERY Right     meniscus tear   • LYMPH NODE BIOPSY Right 07/29/2022    Procedure: BIOPSY LYMPH NODE SENTINEL;  Surgeon: Joan St MD;  [facility];  Service: Surgical Oncology   • MOHS SURGERY Right 12/20/2022    Right dorsal hand MIRIAM-Dr Isabel   • W[place] ARTHRP KNE CONDYLE&PLATU MEDIAL&LAT COMPARTMENTS Right 02/02/2021    Procedure: ARTHROPLASTY KNEE TOTAL;  Surgeon: Aung Askew MD;  [facility];  Service: Orthopedics   • [place] ARTHRP KNE CONDYLE&PLATU MEDIAL&LAT COMPARTMENTS Left 11/17/2021    Procedure: TOTAL KNEE REPLACEMENT;  Surgeon: Aung Askew MD;  [facility];  Service: Orthopedics   • [place] LAPS SURG CHOLECYSTECTOMY [employer]on Police N/A 12/23/2017    Procedure: CHOLECYSTECTOMY LAPAROSCOPIC with cholangiogram;  Surgeon: Nena Mcclain Willam Shea MD;  [facility];  Service: General   • CT SPLENECTOMY TOTAL SEPARATE PROCEDURE N/A 05/18/2017    Procedure: LAPAROSCOPIC HAND ASSIST SPLENECTOMY;  Surgeon: Chelsey Lyles MD;  [facility];  Service: Surgical Oncology   • SHOULDER SURGERY Left     rotator cuff x4, reconstruction   • SKIN BIOPSY  5 May 2022   • SKIN CANCER EXCISION  29 July 2022   • SKIN LESION EXCISION Right 07/29/2022    Procedure: WIDE EXCISION RIGHT MEDIAL THIGH;  Surgeon: Sancho Vasquez MD;  [facility];  Service: Surgical Oncology       Family History   Problem Relation Age of Onset   • Cancer Mother    • Breast cancer Mother    • Other Father         CABG   • Heart attack Paternal Grandfather         acute MI       Social History     Tobacco Use   • Smoking status: Some Days     Packs/day: 0 00     Years: 5 00     Pack years: 0 00     Types: Cigars, Cigarettes   • Smokeless tobacco: Current     Types: Snuff   • Tobacco comments:     5  a week average   Vaping Use   • Vaping Use: Never used   Substance Use Topics   • Alcohol use:  Yes     Alcohol/week: 6 0 standard drinks     Types: 6 Cans of beer per week     Comment: socially   • Drug use: Yes     Types: Marijuana     Comment: medical marijuana          Current Outpatient Medications:   •  acetaminophen (TYLENOL) 325 mg tablet, Take 2 tablets (650 mg total) by mouth every 6 (six) hours as needed for mild pain, Disp:  , Rfl: 0  •  ALPRAZolam (XANAX) 0 5 mg tablet, Take 1 tablet (0 5 mg total) by mouth 2 (two) times a day Take one tablet one hour before scan and bring the second one with you to take right before the MRI if needed, Disp: 2 tablet, Rfl: 0  •  ascorbic acid (VITAMIN C) 1000 MG tablet, Take 1 tablet (1,000 mg total) by mouth every 12 (twelve) hours for 6 doses (Patient taking differently: Take 1,000 mg by mouth daily Every other day), Disp: 6 tablet, Rfl: 0  •  benzonatate (TESSALON PERLES) 100 mg capsule, Take 1 capsule (100 mg total) by mouth 3 (three) times a day (Patient not taking: Reported on 3/7/2023), Disp: 20 capsule, Rfl: 0  •  binimetinib (MEKTOVI) 15 MG tablet, TAKE THREE TABLETS BY MOUTH EVERY 12 HOURS (APPROVED), Disp: , Rfl:   •  dabigatran etexilate (PRADAXA) 150 mg capsu, Take 1 capsule (150 mg total) by mouth every 12 (twelve) hours, Disp: 60 capsule, Rfl: 0  •  dexamethasone (DECADRON) 4 mg tablet, Take 1 tablet (4 mg total) by mouth every 6 (six) hours Please make sure you're on Protonix 40 mg daily for GI prophylaxis, Disp: 28 tablet, Rfl: 0  •  encorafenib (BRAFTOVI) 75 MG capsule, TAKE SIX CAPSULES BY MOUTH DAILY (APPROVED), Disp: , Rfl:   •  furosemide (LASIX) 20 mg tablet, Take 1 tablet (20 mg total) by mouth daily (Patient not taking: Reported on 12/20/2022), Disp: 5 tablet, Rfl: 0  •  hydrochlorothiazide (HYDRODIURIL) 25 mg tablet, Take 1 tablet (25 mg total) by mouth daily (Patient taking differently: Take 25 mg by mouth every morning), Disp: 30 tablet, Rfl: 5  •  metoprolol succinate (TOPROL-XL) 25 mg 24 hr tablet, Take 0 5 tablets (12 5 mg total) by mouth daily (Patient taking differently: Take 12 5 mg by mouth every morning), Disp: 30 tablet, Rfl: 5  •  ondansetron (ZOFRAN) 4 mg tablet, Take 1 tablet (4 mg total) by mouth every 8 (eight) hours as needed for nausea or vomiting (Patient not taking: Reported on 3/7/2023), Disp: 20 tablet, Rfl: 0  •  pantoprazole (PROTONIX) 40 mg tablet, Take 1 tablet (40 mg total) by mouth daily (Patient taking differently: Take 40 mg by mouth every morning), Disp: 90 tablet, Rfl: 3  •  pantoprazole (PROTONIX) 40 mg tablet, Take 1 tablet (40 mg total) by mouth daily, Disp: 30 tablet, Rfl: 1  •  potassium chloride (K-DUR,KLOR-CON) 20 mEq tablet, Take 1 tablet (20 mEq total) by mouth daily (Patient taking differently: Take 20 mEq by mouth every morning), Disp: 30 tablet, Rfl: 3  •  prazosin (MINIPRESS) 1 mg capsule, Take 1 mg by mouth daily at bedtime , Disp: , Rfl:   •  predniSONE 20 mg tablet, Take 1 tablet (20 mg total) by mouth daily, Disp: 30 tablet, Rfl: 1  •  sodium chloride, PF, 0 9 %, 10 mL by Intracatheter route daily Intracatheter flushing daily   May substitute prefilled syringe with normal saline 10 mL vials, 10 mL syringes, and 18 g blunt needles, Disp: 300 mL, Rfl: 0  •  sulfamethoxazole-trimethoprim (BACTRIM DS) 800-160 mg per tablet, Take 1 tablet by mouth 3 (three) times a week Monday, Wednesday and Friday (Patient not taking: Reported on 12/20/2022), Disp: 12 tablet, Rfl: 0  •  VITAMIN D PO, Take 1,000 Units by mouth daily , Disp: , Rfl:     No Known Allergies     Vitals:    03/07/23 1241   BP: 120/84   Pulse: 80   Resp: 20   Temp: (!) 96 8 °F (36 °C)   SpO2: 95%   Weight: 90 1 kg (198 lb 10 2 oz)   Height: 5' 8" (1 727 m)       Pain Score:   8

## 2023-03-07 NOTE — PROGRESS NOTES
Consultation - Radiation Oncology      Patient Name: Arvin Bhandari F:[id number] : 1955  Encounter: [id number]  Referring Provider: Jimbo Velásquez, *    ASSESSMENT  Cancer Staging   Personal history of malignant melanoma  Staging form: Melanoma of the Skin, AJCC 8th Edition  - Clinical stage from 2022: Stage IIB (cT4a, cN0, cM0) - Signed by Sergio Pyle MD on 2022  - Pathologic stage from 2022: Stage IIIC (pT4a, pN1a, cM0) - Signed by Sergio Pyle MD on 2022    PLAN  Arvin Bhandari is a 75 y o  male with known stage III melanoma with recent imaging showing concern for widespread disease recurrence, notably to the brain  MRI of the brain on 3/4/23 showed innumerable metastatic lesions in the brain  His PET/CT also showed numerous areas of disease  He was referred to discuss radiation therapy for brain metastases  We discussed whole brain radiation therapy, which would consist of 10 daily treatments  Risks, benefits and alternatives to these treatment options were also discussed  Side effects may include fatigue, dermatitis, alopecia, headache, nausea, and risk of mild neurocognitive impairment  We discussed that the disease may persist/worsen despite radiation therapy  He would not be a candidate for SRS or hippocampal avoidance at given the number and distribution of these lesions  We have discussed Memantine for neurocognitive preservation and this will start with therapy  The patient agreed to proceed with radiation therapy, and informed consent was signed  CT simulation and treatment to be scheduled at [facility]  We will coordinate medical therapy to be held for RT  Thank you for the opportunity to participate in the care of this patient  Elly Gould MD  Department of [address] [place] [address]    Orders Placed This Encounter   Procedures   • Radiation Simulation Treatment     1   Metastatic melanoma ([facility] [address] )  Ambulatory Referral to Radiation Oncology    Radiation Simulation Treatment    memantine [facility] TITRATION PACK)    DISCONTINUED: memantine (NAMENDA TITRATION PACK)      2  Metastasis to brain [facility])  Ambulatory Referral to Radiation Oncology    Radiation Simulation Treatment    memantine (NAMENDA TITRATION PACK)    DISCONTINUED: memantine (NAMENDA TITRATION PACK)        Total Time Spent  55 minutes spent reviewing EMR in preparation for visit, with the patient, coordination with other providers, and documentation  CHIEF COMPLAINT  Chief Complaint   Patient presents with   • Consult     Rad Onc        History of Present Illness  Tyra Simental 1955 is a 75 y o  male [facility] Mariya Dodd presents for Radiation Oncology Consult  He is referred by [facility]/Med Oncology  Dx: Metastatic Melanoma      Pt is a 74 yo male who has stage IV metastatic melanoma  Recent MRI and PET scan showing findings concerning for widespread disease  Multiple foci suspicious for brain metastasis, pulmonary nodules, retroperitoneal nodules, iliac chains, and bone mets       2 14 23 [facility]   Dr Valentin Llamas:  "Pt is s/p WLE + SLN for T4aN1a melanoma of right knee  Did not receive adjuvant treatment 2/2 side effects  Doing well  Due for PET very soon  Again discussed vigilance about new skin lesions in this region or new lumps/bumps in LN basins  F/up in 6 mons  2 20 23 Mina Vera  "S/P 12/20/22 mohs procedure preformed by Dr Letha Caceres  Patient is pleased with wound healing  Denies fever/chills  Denies pain or irritation  Pt is not limited with movement "  Healing wound, discussed that can hold on further wound care   Wound check prn      3 2 23 PET  IMPRESSION:  1   Findings concerning for widespread disease recurrence  2   Scattered new small foci of radiotracer uptake in the bilateral cerebral hemispheres suspicious for intracranial metastasis   Recommend evaluation with dedicated MRI with and without IV contrast   3   Scattered new subcentimeter pulmonary nodules measuring up to 5 mm  These are not FDG avid but may be too small to characterize by PET  Recommend reassessment on follow-up  4   New mildly FDG avid retroperitoneal nodule just lateral to the right psoas, suspicious for retroperitoneal metastasis  5   Scattered new FDG avid lymph nodes along the proximal iliac chains suspicious for metastasis  6   Multiple new FDG avid right inguinal lymph nodes and subcutaneous nodules extending along the right anteromedial thigh suspicious for metastasis  7   Multiple new FDG avid osseous metastasis       3 4 23 MRI BRAIN  IMPRESSION:  Too numerous to count metastatic lesions in bilateral cerebral hemispheres and to a lesser extent in bilateral cerebellum with perilesional vasogenic edema (bilateral frontal, bilateral parietal, right subinsular, left temporal, and bilateral occipital lobes - worse in right frontal lobe)     The study was marked in EPIC for immediate notification      3 6 23 Keny Yuen Held  Plan: Pt with worsening disease as noted in recent PET and MRI  Will re-attempt  encorafenib and binimetinib regardless of prior symptoms  Will hold off on pembrolizumab at this time considering his history of hemolytic anemia on retuximab    Amb referral to IR      Appt    3 7 23    Derm   4 27 23  Med Onc  Dr Partida Freeze   8 16 23  Daniella Ramírez [address]     Today, the patient notes intermittent headaches  No nausea  No focal neurological weakness  No changes in vision  Fatigue       Oncology History   Personal history of malignant melanoma   5/31/2022 Biopsy    Right Leg, Shave Biopsy   Melanoma extending to the deep specimen margin  Thickness: 7 0mm  Ulceration: not seen   Mitoses: 3      5/31/2022 -  Cancer Staged    Staging form: Melanoma of the Skin, AJCC 8th Edition  - Clinical stage from 5/31/2022: Stage IIB (cT4a, cN0, cM0) - Signed by Maureen Rowe MD on 8/25/2022 7/1/2022 Initial Diagnosis Malignant melanoma of leg, right ([facility] [address] )     7/29/2022 Surgery    A  Lymph node, sentinel, #1:  One lymph node with rare metastatic melanoma cells (1/1), subcapsular location  Immunohistochemical study for MART-1, HMB45 and S100 supports the findings  B  Leg, right, knee, wide excision:  Residual melanoma, nodular type, and scar; margins free  See synoptic report  7/29/2022 -  Cancer Staged    Staging form: Melanoma of the Skin, AJCC 8th Edition  - Pathologic stage from 7/29/2022: Stage IIIC (pT4a, pN1a, cM0) - Signed by Tara Cody MD on 8/25/2022 12/6/2022 Biopsy    Final Diagnosis   A  Skin, Right dorsal hand, Shave biopsy:  Squamous cell carcinoma in-situ, at least; extending to biopsy margins             Historical Information   Past Medical History:   Diagnosis Date   • Anemia 11/02/2016   • Anxiety    • Arthritis    • Autoimmune hemolytic anemia ([facility] [address] )    • Claustrophobia    • COVID 12/2020   • DVT (deep venous thrombosis) (HCC)    • GERD (gastroesophageal reflux disease)    • Hearing loss, right    • Hemolytic anemia (HCC)    • History of transfusion     2018 - no adverse reaction   • Hypertension    • Malignant melanoma of leg, right ([facility] [address] ) 07/01/2022   • Palpitation    • Portal vein thrombosis    • PTSD (post-traumatic stress disorder)    • Pulmonary emboli (HCC)    • Squamous cell skin cancer 12/20/2022    SCCIS- 12/6/22   • Tobacco abuse      Past Surgical History:   Procedure Laterality Date   • CATARACT EXTRACTION Bilateral    • CHOLECYSTECTOMY  07/18/2017   • COLONOSCOPY     • ELBOW ARTHROPLASTY Left     bursectomy   • IR DRAINAGE TUBE CHECK WITH SCLEROSIS  10/06/2022   • IR DRAINAGE TUBE CHECK WITH SCLEROSIS  10/10/2022   • IR DRAINAGE TUBE CHECK WITH SCLEROSIS  10/20/2022   • IR DRAINAGE TUBE CHECK WITH SCLEROSIS  10/27/2022   • IR DRAINAGE TUBE CHECK WITH SCLEROSIS  11/04/2022   • IR DRAINAGE TUBE CHECK WITH SCLEROSIS  11/15/2022   • IR DRAINAGE TUBE CHECK WITH SCLEROSIS 11/25/2022   • IR DRAINAGE TUBE PLACEMENT  09/19/2022   • JOINT REPLACEMENT Right 02/02/2021    knee   • KNEE SURGERY Right     meniscus tear   • LYMPH NODE BIOPSY Right 07/29/2022    Procedure: BIOPSY LYMPH NODE SENTINEL;  Surgeon: Janay Anderson MD;  [facility];  Service: Surgical Oncology   • MOHS SURGERY Right 12/20/2022    Right dorsal hand SCCIS-Dr Isabel   • NE ARTHRP KNE CONDYLE&PLATU MEDIAL&LAT COMPARTMENTS Right 02/02/2021    Procedure: ARTHROPLASTY KNEE TOTAL;  Surgeon: Sathish Mckeon MD;  [facility];  Service: Orthopedics   • NE ARTHRP KNE CONDYLE&PLATU MEDIAL&LAT COMPARTMENTS Left 11/17/2021    Procedure: TOTAL KNEE REPLACEMENT;  Surgeon: Sathish Mckeon MD;  [facility];  Service: Orthopedics   • NE LAPS SURG CHOLECYSTECTOMY Nolene Screven N/A 12/23/2017    Procedure: CHOLECYSTECTOMY LAPAROSCOPIC with cholangiogram;  Surgeon: Donell Snow MD;  [facility];  Service: General   • NE SPLENECTOMY TOTAL SEPARATE PROCEDURE N/A 05/18/2017    Procedure: LAPAROSCOPIC HAND ASSIST SPLENECTOMY;  Surgeon: Yessenia Fermin MD;  [facility];  Service: Surgical Oncology   • SHOULDER SURGERY Left     rotator cuff x4, reconstruction   • SKIN BIOPSY  5 May 2022   • SKIN CANCER EXCISION  29 July 2022   • SKIN LESION EXCISION Right 07/29/2022    Procedure: WIDE EXCISION RIGHT MEDIAL THIGH;  Surgeon: Janay Anderson MD;  [facility];  Service: Surgical Oncology     Family History   Problem Relation Age of Onset   • Cancer Mother    • Breast cancer Mother    • Other Father         CABG   • Heart attack Paternal Grandfather         acute MI     Social History   Social History     Substance and Sexual Activity   Alcohol Use Yes   • Alcohol/week: 6 0 standard drinks   • Types: 6 Cans of beer per week    Comment: socially     Social History     Substance and Sexual Activity   Drug Use Yes   • Types: Marijuana    Comment: medical marijuana     Social History Tobacco Use   Smoking Status Some Days   • Packs/day: 0 00   • Years: 5 00   • Pack years: 0 00   • Types: Cigars, Cigarettes   Smokeless Tobacco Current   • Types: Snuff   Tobacco Comments    5  a week average     Meds/Allergies     Current Outpatient Medications:   •  memantine (NAMENDA TITRATION PACK), Follow package directions and titration schedule reviewed at consultation  Call Radiation Oncology with questions  , Disp: 49 tablet, Rfl: 0  •  acetaminophen (TYLENOL) 325 mg tablet, Take 2 tablets (650 mg total) by mouth every 6 (six) hours as needed for mild pain, Disp:  , Rfl: 0  •  ALPRAZolam (XANAX) 0 5 mg tablet, Take 1 tablet (0 5 mg total) by mouth 2 (two) times a day Take one tablet one hour before scan and bring the second one with you to take right before the MRI if needed, Disp: 2 tablet, Rfl: 0  •  ascorbic acid (VITAMIN C) 1000 MG tablet, Take 1 tablet (1,000 mg total) by mouth every 12 (twelve) hours for 6 doses (Patient taking differently: Take 1,000 mg by mouth daily Every other day), Disp: 6 tablet, Rfl: 0  •  benzonatate (TESSALON PERLES) 100 mg capsule, Take 1 capsule (100 mg total) by mouth 3 (three) times a day (Patient not taking: Reported on 3/7/2023), Disp: 20 capsule, Rfl: 0  •  binimetinib (MEKTOVI) 15 MG tablet, TAKE THREE TABLETS BY MOUTH EVERY 12 HOURS (APPROVED), Disp: , Rfl:   •  dabigatran etexilate (PRADAXA) 150 mg capsu, Take 1 capsule (150 mg total) by mouth every 12 (twelve) hours, Disp: 60 capsule, Rfl: 0  •  dexamethasone (DECADRON) 4 mg tablet, Take 1 tablet (4 mg total) by mouth every 6 (six) hours Please make sure you're on Protonix 40 mg daily for GI prophylaxis, Disp: 28 tablet, Rfl: 0  •  encorafenib (BRAFTOVI) 75 MG capsule, TAKE SIX CAPSULES BY MOUTH DAILY (APPROVED), Disp: , Rfl:   •  furosemide (LASIX) 20 mg tablet, Take 1 tablet (20 mg total) by mouth daily (Patient not taking: Reported on 12/20/2022), Disp: 5 tablet, Rfl: 0  •  hydrochlorothiazide (HYDRODIURIL) 25 mg tablet, Take 1 tablet (25 mg total) by mouth daily (Patient taking differently: Take 25 mg by mouth every morning), Disp: 30 tablet, Rfl: 5  •  metoprolol succinate (TOPROL-XL) 25 mg 24 hr tablet, Take 0 5 tablets (12 5 mg total) by mouth daily (Patient taking differently: Take 12 5 mg by mouth every morning), Disp: 30 tablet, Rfl: 5  •  ondansetron (ZOFRAN) 4 mg tablet, Take 1 tablet (4 mg total) by mouth every 8 (eight) hours as needed for nausea or vomiting (Patient not taking: Reported on 3/7/2023), Disp: 20 tablet, Rfl: 0  •  pantoprazole (PROTONIX) 40 mg tablet, Take 1 tablet (40 mg total) by mouth daily (Patient taking differently: Take 40 mg by mouth every morning), Disp: 90 tablet, Rfl: 3  •  pantoprazole (PROTONIX) 40 mg tablet, Take 1 tablet (40 mg total) by mouth daily, Disp: 30 tablet, Rfl: 1  •  potassium chloride (K-DUR,KLOR-CON) 20 mEq tablet, Take 1 tablet (20 mEq total) by mouth daily (Patient taking differently: Take 20 mEq by mouth every morning), Disp: 30 tablet, Rfl: 3  •  prazosin (MINIPRESS) 1 mg capsule, Take 1 mg by mouth daily at bedtime , Disp: , Rfl:   •  predniSONE 20 mg tablet, Take 1 tablet (20 mg total) by mouth daily, Disp: 30 tablet, Rfl: 1  •  sodium chloride, PF, 0 9 %, 10 mL by Intracatheter route daily Intracatheter flushing daily  May substitute prefilled syringe with normal saline 10 mL vials, 10 mL syringes, and 18 g blunt needles, Disp: 300 mL, Rfl: 0  •  sulfamethoxazole-trimethoprim (BACTRIM DS) 800-160 mg per tablet, Take 1 tablet by mouth 3 (three) times a week Monday, Wednesday and Friday (Patient not taking: Reported on 12/20/2022), Disp: 12 tablet, Rfl: 0  •  VITAMIN D PO, Take 1,000 Units by mouth daily , Disp: , Rfl:   No Known Allergies    Pathology:  See above      Review of Systems  Refer to nursing note    OBJECTIVE:   /84   Pulse 80   Temp (!) 96 8 °F (36 °C)   Resp 20   Ht 5' 8" (1 727 m)   Wt 90 1 kg (198 lb 10 2 oz)   SpO2 95%   BMI 30 20 kg/m²   Pain Assessment:  0  Performance Status: ECO - Asymptomatic    Physical Exam  General Appearance:  Alert, cooperative, no distress, appears stated age  Lungs: Respirations unlabored, no cyanosis, able to speak in complete sentences without dyspnea  Skin: No generalized rash or dermatitis  Neurologic: ANOx3, CNII-XII intact  Strength 5/5 in UE and LE  speech and cognition intact  Portions of the record may have been created with voice recognition software  Occasional wrong word or "sound a like" substitutions may have occurred due to the inherent limitations of voice recognition software  Read the chart carefully and recognize, using context, where substitutions have occurred

## 2023-03-07 NOTE — PROGRESS NOTES
[facility]     Patient Name: Sher Hatch  Encounter Date: 03/07/2023     Have you been cared for by a [place] [address] Dermatologist in the last 3 years and, if so, which description applies to you? Yes  I have been here within the last 3 years, and my medical history has NOT changed since that time  I am MALE/not capable of bearing children  REVIEW OF SYSTEMS:  Have you recently had or currently have any of the following? · No changes in my recent health  PAST MEDICAL HISTORY:  Have you personally ever had or currently have any of the following? If "YES," then please provide more detail  · No changes in my medical history  FAMILY HISTORY:  Any "first degree relatives" (parent, brother, sister, or child) with the following? • No changes in my family's known health  PATIENT EXPERIENCE:    • Do you want the Dermatologist to perform a COMPLETE skin exam today including a clinical examination under the "bra and underwear" areas? Yes  • If necessary, do we have your permission to call and leave a detailed message on your Preferred Phone number that includes your specific medical information?   Yes      No Known Allergies   Current Outpatient Medications:   •  acetaminophen (TYLENOL) 325 mg tablet, Take 2 tablets (650 mg total) by mouth every 6 (six) hours as needed for mild pain, Disp:  , Rfl: 0  •  ALPRAZolam (XANAX) 0 5 mg tablet, Take 1 tablet (0 5 mg total) by mouth 2 (two) times a day Take one tablet one hour before scan and bring the second one with you to take right before the MRI if needed, Disp: 2 tablet, Rfl: 0  •  ascorbic acid (VITAMIN C) 1000 MG tablet, Take 1 tablet (1,000 mg total) by mouth every 12 (twelve) hours for 6 doses (Patient taking differently: Take 1,000 mg by mouth daily Every other day), Disp: 6 tablet, Rfl: 0  •  benzonatate (TESSALON PERLES) 100 mg capsule, Take 1 capsule (100 mg total) by mouth 3 (three) times a day (Patient not taking: Reported on 12/20/2022), Disp: 20 capsule, Rfl: 0  •  dabigatran etexilate (PRADAXA) 150 mg capsu, Take 1 capsule (150 mg total) by mouth every 12 (twelve) hours, Disp: 60 capsule, Rfl: 0  •  dexamethasone (DECADRON) 4 mg tablet, Take 1 tablet (4 mg total) by mouth every 6 (six) hours Please make sure you're on Protonix 40 mg daily for GI prophylaxis, Disp: 28 tablet, Rfl: 0  •  furosemide (LASIX) 20 mg tablet, Take 1 tablet (20 mg total) by mouth daily (Patient not taking: Reported on 12/20/2022), Disp: 5 tablet, Rfl: 0  •  hydrochlorothiazide (HYDRODIURIL) 25 mg tablet, Take 1 tablet (25 mg total) by mouth daily (Patient taking differently: Take 25 mg by mouth every morning), Disp: 30 tablet, Rfl: 5  •  metoprolol succinate (TOPROL-XL) 25 mg 24 hr tablet, Take 0 5 tablets (12 5 mg total) by mouth daily (Patient taking differently: Take 12 5 mg by mouth every morning), Disp: 30 tablet, Rfl: 5  •  ondansetron (ZOFRAN) 4 mg tablet, Take 1 tablet (4 mg total) by mouth every 8 (eight) hours as needed for nausea or vomiting, Disp: 20 tablet, Rfl: 0  •  pantoprazole (PROTONIX) 40 mg tablet, Take 1 tablet (40 mg total) by mouth daily (Patient taking differently: Take 40 mg by mouth every morning), Disp: 90 tablet, Rfl: 3  •  pantoprazole (PROTONIX) 40 mg tablet, Take 1 tablet (40 mg total) by mouth daily, Disp: 30 tablet, Rfl: 1  •  potassium chloride (K-DUR,KLOR-CON) 20 mEq tablet, Take 1 tablet (20 mEq total) by mouth daily (Patient taking differently: Take 20 mEq by mouth every morning), Disp: 30 tablet, Rfl: 3  •  prazosin (MINIPRESS) 1 mg capsule, Take 1 mg by mouth daily at bedtime , Disp: , Rfl:   •  predniSONE 20 mg tablet, Take 1 tablet (20 mg total) by mouth daily, Disp: 30 tablet, Rfl: 1  •  sodium chloride, PF, 0 9 %, 10 mL by Intracatheter route daily Intracatheter flushing daily   May substitute prefilled syringe with normal saline 10 mL vials, 10 mL syringes, and 18 g blunt needles, Disp: 300 mL, Rfl: 0  • sulfamethoxazole-trimethoprim (BACTRIM DS) 800-160 mg per tablet, Take 1 tablet by mouth 3 (three) times a week Monday, Wednesday and Friday (Patient not taking: Reported on 12/20/2022), Disp: 12 tablet, Rfl: 0  •  VITAMIN D PO, Take 1,000 Units by mouth daily , Disp: , Rfl:           • Whom besides the patient is providing clinical information about today's encounter?   o NO ADDITIONAL HISTORIAN (patient alone provided history)    Physical Exam and Assessment/Plan by Diagnosis:  HISTORY OF MELANOMA    Physical Exam:  • Anatomic Location Affected: Right leg/knee  • Morphological Description of Scar:  Well healed scar  • Year Treated: 2022  • TNM Classification: Stage IV metastatic  • Suspected Recurrence: no  • Regional adenopathy: no    5/31/2022 Biopsy     Right Leg, Shave Biopsy   Melanoma extending to the deep specimen margin  Thickness: 7 0mm  Ulceration: not seen   Mitoses: 3       5/31/2022 -  Cancer Staged     Staging form: Melanoma of the Skin, AJCC 8th Edition  - Clinical stage from 5/31/2022: Stage IIB (cT4a, cN0, cM0) - Signed by Britany Hogan MD on 8/25/2022 7/1/2022 Initial Diagnosis     Malignant melanoma of leg, right ([facility] [address] )      7/29/2022 Surgery     A  Lymph node, sentinel, #1:  One lymph node with rare metastatic melanoma cells (1/1), subcapsular location  Immunohistochemical study for MART-1, HMB45 and S100 supports the findings          B  Leg, right, knee, wide excision:  Residual melanoma, nodular type, and scar; margins free  See synoptic report       7/29/2022 -  Cancer Staged     Staging form: Melanoma of the Skin, AJCC 8th Edition  - Pathologic stage from 7/29/2022: Stage IIIC (pT4a, pN1a, cM0) - Signed by Britany Hogan MD on 8/25/2022            Additional History of Present Condition:  The patient denies fevers, chills, night sweats, weight loss, bone pain, paraesthesias, cough, abdominal pain, n/v/d but is reporting headache and dizziness that led to imaging recently  Recent MRI and NM PET CT showing findings concerning for widespread disease recurrence  Has longstanding shortness of breath  Multiple foci suspicious for brain metastasis, pulmonary nodules, retroperitoneal nodules, iliac chains, and bone mets  Eye exam this week but overdue for dental  He just restarted therapy with  encorafenib and binimetinib and is pending XRT to the brain lesions  Pembrolizumab being held due to history of hemolytic anemia on retuximab  He states he is feeling well but just restarted his therapy this week  Assessment and Plan:  Based on a thorough discussion of this condition and the management approach to it (including a comprehensive discussion of the known risks, side effects and potential benefits of treatment), the patient (family) agrees to implement the following specific plan:  • Will monitor for recurrence; scar appears well healed with NER but well known current widespread metastatic disease; continue follow up with Dr Manjula Meyers, current systemic therapy, and follow up for XRT and imaging as planned per Dr Manjula Meyers  • Recommend skin exam in 3 months  • Advised dental and eye exam annually      What happens at follow-up? The main purpose of follow-up is to detect recurrences early (metastatic melanoma), but it also offers an opportunity to diagnose a new primary melanoma at the first possible opportunity  A second invasive melanoma occurs in 5-10% of melanoma patients and a new melanoma in situ is diagnosed in more than 20% of melanoma patients  Our practice makes the following recommendations for follow-up for patients with invasive melanoma    • At-least "monthly" self-skin examinations   • Routine skin checks by a board certified dermatologist  • Follow-up intervals are "every 3 months" within 2 years of a new melanoma diagnosis; "every 6 months" between 2-4 years of a new melanoma diagnosis; and "annually" after 4 years of a new melanoma diagnosis  • Individual patient's needs should be considered before an appropriate follow-up is offered  • Provide education and support to help the patient adjust to their illness    Follow-up appointments should include:  • A check of the scar where the primary melanoma was removed  • Checking the regional lymph nodes  • A general skin examination  • A full physical examination at least annually by your primary care physician    In those with more advanced primary disease, follow-up may include:  • Blood tests  • Imaging: ultrasound, X-ray, CT, MRI and PET scan  Most tests are not worthwhile for patients with stage 1 or 2 melanoma unless there are signs or symptoms of disease recurrence or metastasis  No tests are necessary for healthy patients who have remained well for five years or longer after removal of their melanoma  What is the outlook for patients with melanoma? • Melanoma in situ is cured by excision because it has no potential to spread around the body  • The risk of spread and ultimate death from invasive melanoma depends on several factors, but the main one is the Breslow thickness of the melanoma at the time it was surgically removed  • Metastases are rare for melanomas < 0 75 mm and the risk for tumours 0 75-1 mm thick is about 5%  The risk steadily increases with thickness so that melanomas > 4 mm have a risk of metastasis of about 40%  Melanoma is a potentially serious type of skin cancer, in which there is uncontrolled growth of melanocytes (pigment cells)  Melanoma is sometimes called malignant melanoma  Normal melanocytes are found in the basal layer of the epidermis (the outer layer of skin)  Melanocytes produce a protein called melanin, which protects skin cells by absorbing ultraviolet (UV) radiation  Melanocytes are found in equal numbers in black and white skin, but melanocytes in black skin produce much more melanin   People with dark brown or black skin are very much less likely to be damaged by UV radiation than those with white skin  MELANOCYTIC NEVI ("Moles")    Physical Exam:  • Anatomic Location Affected:   Mostly on sun-exposed areas of the trunk and extremities  • Morphological Description:  Scattered, 1-4mm round to ovoid, symmetric and evenly bordered, regularly pigmented macules/papules without outliers other than if noted elsewhere in today's note  • Pertinent Positives:  • Pertinent Negatives: Additional History of Present Condition:      Assessment and Plan:  Based on a thorough discussion of this condition and the management approach to it (including a comprehensive discussion of the known risks, side effects and potential benefits of treatment), the patient (family) agrees to implement the following specific plan:  • The patient was encouraged to use an SPF30+ broad spectrum sunscreen daily and re-apply every 2-3 outdoors while outside  The importance of sun protection, self-skin exams, and sun avoidance was emphasized  An annual full body skin exam is recommended, and the patient was encouraged to return to the office sooner for any new or changing lesions of concerns  • Benign, reassured  • Annual skin check     Melanocytic Nevi  Melanocytic nevi ("moles") are tan or brown, raised or flat areas of the skin which have an increased number of melanocytes  Melanocytes are the cells in our body which make pigment and account for skin color  Some moles are present at birth (I e , "congenital nevi"), while others come up later in life (i e , "acquired nevi")  The sun can stimulate the body to make more moles  Sunburns are not the only thing that triggers more moles  Chronic sun exposure can do it too  Clinically distinguishing a healthy mole from melanoma may be difficult, even for experienced dermatologists  The "ABCDE's" of moles have been suggested as a means of helping to alert a person to a suspicious mole and the possible increased risk of melanoma    The suggestions for raising alert are as follows:    Asymmetry: Healthy moles tend to be symmetric, while melanomas are often asymmetric  Asymmetry means if you draw a line through the mole, the two halves do not match in color, size, shape, or surface texture  Asymmetry can be a result of rapid enlargement of a mole, the development of a raised area on a previously flat lesion, scaling, ulceration, bleeding or scabbing within the mole  Any mole that starts to demonstrate "asymmetry" should be examined promptly by a board certified dermatologist      Border: Healthy moles tend to have discrete, even borders  The border of a melanoma often blends into the normal skin and does not sharply delineate the mole from normal skin  Any mole that starts to demonstrate "uneven borders" should be examined promptly by a board certified dermatologist      Color: Healthy moles tend to be one color throughout  Melanomas tend to be made up of different colors ranging from dark black, blue, white, or red  Any mole that demonstrates a color change should be examined promptly by a board certified dermatologist      Diameter: Healthy moles tend to be smaller than 0 6 cm in size; an exception are "congenital nevi" that can be larger  Melanomas tend to grow and can often be greater than 0 6 cm (1/4 of an inch, or the size of a pencil eraser)  This is only a guideline, and many normal moles may be larger than 0 6 cm without being unhealthy  Any mole that starts to change in size (small to bigger or bigger to smaller) should be examined promptly by a board certified dermatologist      Evolving: Healthy moles tend to "stay the same "  Melanomas may often show signs of change or evolution such as a change in size, shape, color, or elevation    Any mole that starts to itch, bleed, crust, burn, hurt, or ulcerate or demonstrate a change or evolution should be examined promptly by a board certified dermatologist         LENTIGO    Physical Exam:  • Anatomic Location Affected: Sun exposed areas  • Morphological Description:  Light brown well demarcated macules on sun exposed skin with reassuring dermoscopy  • Pertinent Positives:  • Pertinent Negatives: Additional History of Present Condition:      Assessment and Plan:  Based on a thorough discussion of this condition and the management approach to it (including a comprehensive discussion of the known risks, side effects and potential benefits of treatment), the patient (family) agrees to implement the following specific plan:  • When outside we recommend using a wide brim hat, sunglasses, long sleeve and pants, sunscreen with SPF 09+ with reapplication every 2 hours, or SPF specific clothing       What is a lentigo? A lentigo is a pigmented flat or slightly raised lesion with a clearly defined edge  Unlike an ephelis (freckle), it does not fade in the winter months  There are several kinds of lentigo  The name lentigo originally referred to its appearance resembling a small lentil  The plural of lentigo is lentigines, although “lentigos” is also in common use  Who gets lentigines? Lentigines can affect males and females of all ages and races  Solar lentigines are especially prevalent in fair skinned adults  Lentigines associated with syndromes are present at birth or arise during childhood  What causes lentigines? Common forms of lentigo are due to exposure to ultraviolet radiation:  • Sun damage including sunburn   • Indoor tanning   • Phototherapy, especially photochemotherapy (PUVA)    Ionizing radiation, eg radiation therapy, can also cause lentigines  Several familial syndromes associated with widespread lentigines originate from mutations in Nathaniel-MAP kinase, mTOR signaling and PTEN pathways  What is the treatment for lentigines? Most lentigines are left alone  Attempts to lighten them may not be successful   The following approaches are used:  • SPF 50+ broad-spectrum sunscreen   • Hydroquinone bleaching cream • Alpha hydroxy acids   • Vitamin C   • Retinoids   • Azelaic acid   • Chemical peels  Individual lesions can be permanently removed using:  • Cryotherapy   • Intense pulsed light   • Pigment lasers    How can lentigines be prevented? Lentigines associated with exposure ultraviolet radiation can be prevented by very careful sun protection  Clothing is more successful at preventing new lentigines than are sunscreens  What is the outlook for lentigines? Lentigines usually persist  They may increase in number with age and sun exposure  Some in sun-protected sites may fade and disappear  SIMS ANGIOMAS    Physical Exam:  • Anatomic Location Affected:  trunk  • Morphological Description:  Scattered cherry red, variably sized papules  • Pertinent Positives:  • Pertinent Negatives: Additional History of Present Condition:      Assessment and Plan:  Based on a thorough discussion of this condition and the management approach to it (including a comprehensive discussion of the known risks, side effects and potential benefits of treatment), the patient (family) agrees to implement the following specific plan:  • Monitor for changes  • Benign, reassured  •     Assessment and Plan:    Cherry angioma, also known as [employer] spots, are benign vascular skin lesions  A "cherry angioma" is a firm red, blue or purple papule, 0 1-1 cm in diameter  When thrombosed, they can appear black in colour until evaluated with a dermatoscope when the red or purple colour is more easily seen  Cherry angioma may develop on any part of the body but most often appear on the scalp, face, lips and trunk  An angioma is due to proliferating endothelial cells; these are the cells that line the inside of a blood vessel  Angiomas can arise in early life or later in life; the most common type of angioma is a cherry angioma  Cherry angiomas are very common in males and females of any age or race   They are more noticeable in white skin than in skin of colour  They markedly increase in number from about the age of 36  There may be a family history of similar lesions  Eruptive cherry angiomas have been rarely reported to be associated with internal malignancy  The cause of angiomas is unknown  Genetic analysis of cherry angiomas has shown that they frequently carry specific somatic missense mutations in the GNAQ and GNA11 (Q209H) genes, which are involved in other vascular and melanocytic proliferations  SEBORRHEIC KERATOSIS; NON-INFLAMED    Physical Exam:  • Anatomic Location Affected:  trunk  • Morphological Description:  Brown waxy variably sized "stuck-on" appearing papules with reassuring dermoscopy  • Pertinent Positives:  • Pertinent Negatives: Additional History of Present Condition:      Assessment and Plan:  Based on a thorough discussion of this condition and the management approach to it (including a comprehensive discussion of the known risks, side effects and potential benefits of treatment), the patient (family) agrees to implement the following specific plan:  • Monitor for changes  • Benign, reassured  •     Seborrheic Keratosis  A seborrheic keratosis is a harmless warty spot that appears during adult life as a common sign of skin aging  Seborrheic keratoses can arise on any area of skin, covered or uncovered, with the usual exception of the palms and soles  They do not arise from mucous membranes  Seborrheic keratoses can have highly variable appearance  Seborrheic keratoses are extremely common  It has been estimated that over 90% of adults over the age of 61 years have one or more of them  They occur in males and females of all races, typically beginning to erupt in the 35s or 45s  They are uncommon under the age of 21 years  The precise cause of seborrhoeic keratoses is not known  Seborrhoeic keratoses are considered degenerative in nature  As time goes by, seborrheic keratoses tend to become more numerous  Some people inherit a tendency to develop a very large number of them; some people may have hundreds of them  There is no easy way to remove multiple lesions on a single occasion  Unless a specific lesion is "inflamed" and is causing pain or stinging/burning or is bleeding, most insurance companies do not authorize treatment  ACTINIC KERATOSIS    Physical Exam:  • Anatomic Location Affected:  Right scalp  • Morphological Description:  Erythematous scaly macules without palpable dermal component    Additional History of Present Condition:  Patient presents with two spots on scalp that he has had for months  Patient reports some itching and flaking in those areas  Assessment and Plan:  Based on a thorough discussion of this condition and the management approach to it (including a comprehensive discussion of the known risks, side effects and potential benefits of treatment), the patient (family) agrees to implement the following specific plan:    • Offered biopsy vs LN2 today  Suspect these are both hypertrophic AKs  Patient declined biopsies due to impending XRT for brain and more pressing medical issues  • Precancerous nature of these lesions reviewed  Risk of progression to [facility] if untreated/unresolved after therapy reviewed  • Lesions treated today in the office with liquid nitrogen x 2 cycles per site  • Patient counseled to return to the office in 4-6 weeks for recheck if not resolved at which time retreatment of biopsy to rule out SCC will be determined based on clinic findings    Actinic keratoses are very common on sites repeatedly exposed to the sun, especially the backs of the hands and the face, most often affecting the ears, nose, cheeks, upper lip, vermilion of the lower lip, temples, forehead and balding scalp  In severely chronically sun-damaged individuals, they may also be found on the upper trunk, upper and lower limbs, and dorsum of feet      We discussed the theoretical premalignant (“pre-cancerous”) nature and etiology of these growths  We discussed the prevailing notion that actinic keratoses are a reflection of abnormal skin cell development due to DNA damage by short wavelength UVB  They are more likely to appear if the immune function is poor, due to aging, recent sun exposure, predisposing disease or certain drugs  We discussed that the main concern is that actinic keratoses may predispose to squamous cell carcinoma  It is rare for a solitary actinic keratosis to evolve to squamous cell carcinoma (SCC), but the risk of SCC occurring at some stage in a patient with more than 10 actinic keratoses is thought to be about 10 to 15%  A tender, thickened, ulcerated or enlarging actinic keratosis is suspicious of SCC  Actinic keratoses may be prevented by strict sun protection  If already present, keratoses may improve with a very high sun protection factor (50+) broad-spectrum sunscreen applied at least daily to affected areas, year-round  We recommend that UPF-rated clothing and hats and sunglasses be worn whenever possible and that a sunscreen-moisturizer combination product such as Neutrogena Daily Defense be applied at least three times a day  We performed a thorough discussion of treatment options and specific risk/benefits/alternatives including but not limited to medical “field” treatment with medications such as the following:    • Topical “field area” medications such as 5-fluorouracil or Aldara (specifically, the trouble with long-term compliance, blistering and local skin reaction versus the convenience of at-home therapy and that field therapy “gets what is not yet seen”)  • Cryotherapy (specifically, local pain, scarring, dyspigmentation, blistering, possible superinfection, and treats “only what we see” versus directed treatment today)      • Photodynamic therapy (specifically, local pain, scarring, dyspigmentation, blistering, possible superinfection, need to schedule for a later date, and time spent in the office versus field therapy that “gets what is not yet seen”)  PROCEDURE:  DESTRUCTION OF PRE-MALIGNANT LESIONS  After a thorough discussion of treatment options and risk/benefits/alternatives (including but not limited to local pain, scarring, dyspigmentation, blistering, and possible superinfection), verbal and written consent were obtained and the aforementioned lesions were treated on with cryotherapy using liquid nitrogen x 1 cycle for 5-10 seconds  • TOTAL NUMBER of 2 pre-malignant lesions were treated today on the ANATOMIC LOCATION: scalp  The patient tolerated the procedure well, and after-care instructions were provided      Scribe Attestation    I,:  Monty Davis am acting as a scribe while in the presence of the attending physician :       I,:  Shania Frankel MD personally performed the services described in this documentation    as scribed in my presence :

## 2023-03-07 NOTE — LETTER
2023     [facility]    Patient: Shoabi Dumont   YOB: 1955   Date of Visit: 3/7/2023       Dear Dr Patric Mendoza and Dr Alpa Marquez,    Thank you for referring Gregg Romo to me for evaluation  Below are my notes for this consultation  He is scheduled for CT simulation on 3/8/23, with a plan for whole brain RT in 10 fractions  Would you like to hold his targeted therapy during RT? The patient can be informed how to do this pending scheduling of his first day of radiation  If you have questions, please do not hesitate to call me  I look forward to following your patient along with you  Sincerely,        Kat Nance MD        CC: MD Kat Valverde MD  3/7/2023  2:27 PM  Sign when Signing Visit  Consultation - Radiation Oncology      Patient Name: Shoaib Dumont [id number] : 1955  Encounter: [id number]  Referring Provider: Heather Wylie, *    ASSESSMENT  Cancer Staging   Personal history of malignant melanoma  Staging form: Melanoma of the Skin, AJCC 8th Edition  - Clinical stage from 2022: Stage IIB (cT4a, cN0, cM0) - Signed by Maureen Rowe MD on 2022  - Pathologic stage from 2022: Stage IIIC (pT4a, pN1a, cM0) - Signed by Maureen Rowe MD on 2022    PLAN  Shoaib Dumont is a 75 y o  male with known stage III melanoma with recent imaging showing concern for widespread disease recurrence, notably to the brain  MRI of the brain on 3/4/23 showed innumerable metastatic lesions in the brain  His PET/CT also showed numerous areas of disease  He was referred to discuss radiation therapy for brain metastases  We discussed whole brain radiation therapy, which would consist of 10 daily treatments  Risks, benefits and alternatives to these treatment options were also discussed   Side effects may include fatigue, dermatitis, alopecia, headache, nausea, and risk of mild neurocognitive impairment  We discussed that the disease may persist/worsen despite radiation therapy  He would not be a candidate for SRS or hippocampal avoidance at given the number and distribution of these lesions  We have discussed Memantine for neurocognitive preservation and this will start with therapy  The patient agreed to proceed with radiation therapy, and informed consent was signed  CT simulation and treatment to be scheduled at [facility]  We will coordinate medical therapy to be held for RT  Thank you for the opportunity to participate in the care of this patient  Sharmin Woodward MD  Department of [address] [place] [address] 12    Orders Placed This Encounter   Procedures   • Radiation Simulation Treatment     1  Metastatic melanoma [facility])  Ambulatory Referral to Radiation Oncology    Radiation Simulation Treatment    memantine [facility] TITRATION PACK)    DISCONTINUED: memantine (NAMENDA TITRATION PACK)      2  Metastasis to brain [facility])  Ambulatory Referral to Radiation Oncology    Radiation Simulation Treatment    memantine (NAMENDA TITRATION PACK)    DISCONTINUED: memantine (NAMENDA TITRATION PACK)        Total Time Spent  55 minutes spent reviewing EMR in preparation for visit, with the patient, coordination with other providers, and documentation  CHIEF COMPLAINT  Chief Complaint   Patient presents with   • Consult     Rad Onc        History of Present Illness  Michelle Valdez 1955 is a 75 y o  male Derothrobina Jimenezlk "Marlyce Bones" Wong Jane presents for Radiation Oncology Consult  He is referred by [facility]Med Oncology  Dx: Metastatic Melanoma      Pt is a 74 yo male who has stage IV metastatic melanoma  Recent MRI and PET scan showing findings concerning for widespread disease      Multiple foci suspicious for brain metastasis, pulmonary nodules, retroperitoneal nodules, iliac chains, and bone mets       2 14 23 [facility]   Plan:  "Pt is s/p WLE + SLN for T4aN1a melanoma of right knee  Did not receive adjuvant treatment 2/2 side effects  Doing well  Due for PET very soon  Again discussed vigilance about new skin lesions in this region or new lumps/bumps in LN basins  F/up in 6 mons  2 20 23 Adis Nunes  "S/P 12/20/22 mohs procedure preformed by Dr Mattson Rm  Patient is pleased with wound healing  Denies fever/chills  Denies pain or irritation  Pt is not limited with movement "  Healing wound, discussed that can hold on further wound care   Wound check prn      3 2 23 PET  IMPRESSION:  1   Findings concerning for widespread disease recurrence  2   Scattered new small foci of radiotracer uptake in the bilateral cerebral hemispheres suspicious for intracranial metastasis  Recommend evaluation with dedicated MRI with and without IV contrast   3   Scattered new subcentimeter pulmonary nodules measuring up to 5 mm  These are not FDG avid but may be too small to characterize by PET  Recommend reassessment on follow-up  4   New mildly FDG avid retroperitoneal nodule just lateral to the right psoas, suspicious for retroperitoneal metastasis  5   Scattered new FDG avid lymph nodes along the proximal iliac chains suspicious for metastasis  6   Multiple new FDG avid right inguinal lymph nodes and subcutaneous nodules extending along the right anteromedial thigh suspicious for metastasis  7   Multiple new FDG avid osseous metastasis       3 4 23 MRI BRAIN  IMPRESSION:  Too numerous to count metastatic lesions in bilateral cerebral hemispheres and to a lesser extent in bilateral cerebellum with perilesional vasogenic edema (bilateral frontal, bilateral parietal, right subinsular, left temporal, and bilateral occipital lobes - worse in right frontal lobe)     The study was marked in EPIC for immediate notification      3 6 23 Wolfgang Whitt  Plan: Pt with worsening disease as noted in recent PET and MRI     Will re-attempt  encorafenib and binimetinib regardless of prior symptoms  Will hold off on pembrolizumab at this time considering his history of hemolytic anemia on retuximab    Amb referral to IR      Appt    3 7 23    Derm   4 27 23  Med Onc  Dr Juan R Wilkes   8 16 23  [facility] Dark  Dr Boswell Memory     Today, the patient notes intermittent headaches  No nausea  No focal neurological weakness  No changes in vision  Fatigue  Oncology History   Personal history of malignant melanoma   5/31/2022 Biopsy    Right Leg, Shave Biopsy   Melanoma extending to the deep specimen margin  Thickness: 7 0mm  Ulceration: not seen   Mitoses: 3      5/31/2022 -  Cancer Staged    Staging form: Melanoma of the Skin, AJCC 8th Edition  - Clinical stage from 5/31/2022: Stage IIB (cT4a, cN0, cM0) - Signed by Jez Valadez MD on 8/25/2022 7/1/2022 Initial Diagnosis    Malignant melanoma of leg, right ([facility] [address] )     7/29/2022 Surgery    A  Lymph node, sentinel, #1:  One lymph node with rare metastatic melanoma cells (1/1), subcapsular location  Immunohistochemical study for MART-1, HMB45 and S100 supports the findings  B  Leg, right, knee, wide excision:  Residual melanoma, nodular type, and scar; margins free  See synoptic report  7/29/2022 -  Cancer Staged    Staging form: Melanoma of the Skin, AJCC 8th Edition  - Pathologic stage from 7/29/2022: Stage IIIC (pT4a, pN1a, cM0) - Signed by Jez Valadez MD on 8/25/2022 12/6/2022 Biopsy    Final Diagnosis   A  Skin, Right dorsal hand, Shave biopsy:  Squamous cell carcinoma in-situ, at least; extending to biopsy margins             Historical Information   Past Medical History:   Diagnosis Date   • Anemia 11/02/2016   • Anxiety    • Arthritis    • Autoimmune hemolytic anemia ([address] )    • Claustrophobia    • COVID 12/2020   • DVT (deep venous thrombosis) (HCC)    • GERD (gastroesophageal reflux disease)    • Hearing loss, right    • Hemolytic anemia ([address] )    • History of transfusion     2018 - no adverse reaction • Hypertension    • Malignant melanoma of leg, right (HCC) 07/01/2022   • Palpitation    • Portal vein thrombosis    • PTSD (post-traumatic stress disorder)    • Pulmonary emboli (HCC)    • Squamous cell skin cancer 12/20/2022    SCCIS- 12/6/22   • Tobacco abuse      Past Surgical History:   Procedure Laterality Date   • CATARACT EXTRACTION Bilateral    • CHOLECYSTECTOMY  07/18/2017   • COLONOSCOPY     • ELBOW ARTHROPLASTY Left     bursectomy   • IR DRAINAGE TUBE CHECK WITH SCLEROSIS  10/06/2022   • IR DRAINAGE TUBE CHECK WITH SCLEROSIS  10/10/2022   • IR DRAINAGE TUBE CHECK WITH SCLEROSIS  10/20/2022   • IR DRAINAGE TUBE CHECK WITH SCLEROSIS  10/27/2022   • IR DRAINAGE TUBE CHECK WITH SCLEROSIS  11/04/2022   • IR DRAINAGE TUBE CHECK WITH SCLEROSIS  11/15/2022   • IR DRAINAGE TUBE CHECK WITH SCLEROSIS  11/25/2022   • IR DRAINAGE TUBE PLACEMENT  09/19/2022   • JOINT REPLACEMENT Right 02/02/2021    knee   • KNEE SURGERY Right     meniscus tear   • LYMPH NODE BIOPSY Right 07/29/2022    Procedure: BIOPSY LYMPH NODE SENTINEL;  Surgeon: Sandy Simpson MD;  [facility]: [facility];  Service: Surgical Oncology   • MOHS SURGERY Right 12/20/2022    Right dorsal hand SCCIS-Dr Isabel   • MD ARTHRP KNE CONDYLE&PLATU MEDIAL&LAT COMPARTMENTS Right 02/02/2021    Procedure: ARTHROPLASTY KNEE TOTAL;  Surgeon: Randal Loera MD;  [facility];  Service: Orthopedics   • MD ARTHRP KNE CONDYLE&PLATU MEDIAL&LAT COMPARTMENTS Left 11/17/2021    Procedure: TOTAL KNEE REPLACEMENT;  Surgeon: Randal Loera MD;  [facility];  Service: Orthopedics   • MD LAPS SURG CHOLECYSTECTOMY Carmelo Shook N/A 12/23/2017    Procedure: CHOLECYSTECTOMY LAPAROSCOPIC with cholangiogram;  Surgeon: Jez Pena MD;  [facility];  Service: General   • MD SPLENECTOMY TOTAL SEPARATE PROCEDURE N/A 05/18/2017    Procedure: LAPAROSCOPIC HAND ASSIST SPLENECTOMY;  Surgeon: Arely Hood MD;  [facility];  Service: Surgical Oncology   • SHOULDER SURGERY Left     rotator cuff x4, reconstruction   • SKIN BIOPSY  5 May 2022   • SKIN CANCER EXCISION  29 July 2022   • SKIN LESION EXCISION Right 07/29/2022    Procedure: WIDE EXCISION RIGHT MEDIAL THIGH;  Surgeon: Joan St MD;  [facility];  Service: Surgical Oncology     Family History   Problem Relation Age of Onset   • Cancer Mother    • Breast cancer Mother    • Other Father         CABG   • Heart attack Paternal Grandfather         acute MI     Social History   Social History     Substance and Sexual Activity   Alcohol Use Yes   • Alcohol/week: 6 0 standard drinks   • Types: 6 Cans of beer per week    Comment: socially     Social History     Substance and Sexual Activity   Drug Use Yes   • Types: Marijuana    Comment: medical marijuana     Social History     Tobacco Use   Smoking Status Some Days   • Packs/day: 0 00   • Years: 5 00   • Pack years: 0 00   • Types: Cigars, Cigarettes   Smokeless Tobacco Current   • Types: Snuff   Tobacco Comments    5  a week average     Meds/Allergies      Current Outpatient Medications:   •  memantine (NAMENDA TITRATION PACK), Follow package directions and titration schedule reviewed at consultation  Call Radiation Oncology with questions  , Disp: 49 tablet, Rfl: 0  •  acetaminophen (TYLENOL) 325 mg tablet, Take 2 tablets (650 mg total) by mouth every 6 (six) hours as needed for mild pain, Disp:  , Rfl: 0  •  ALPRAZolam (XANAX) 0 5 mg tablet, Take 1 tablet (0 5 mg total) by mouth 2 (two) times a day Take one tablet one hour before scan and bring the second one with you to take right before the MRI if needed, Disp: 2 tablet, Rfl: 0  •  ascorbic acid (VITAMIN C) 1000 MG tablet, Take 1 tablet (1,000 mg total) by mouth every 12 (twelve) hours for 6 doses (Patient taking differently: Take 1,000 mg by mouth daily Every other day), Disp: 6 tablet, Rfl: 0  •  benzonatate (TESSALON PERLES) 100 mg capsule, Take 1 capsule (100 mg total) by mouth 3 (three) times a day (Patient not taking: Reported on 3/7/2023), Disp: 20 capsule, Rfl: 0  •  binimetinib (MEKTOVI) 15 MG tablet, TAKE THREE TABLETS BY MOUTH EVERY 12 HOURS (APPROVED), Disp: , Rfl:   •  dabigatran etexilate (PRADAXA) 150 mg capsu, Take 1 capsule (150 mg total) by mouth every 12 (twelve) hours, Disp: 60 capsule, Rfl: 0  •  dexamethasone (DECADRON) 4 mg tablet, Take 1 tablet (4 mg total) by mouth every 6 (six) hours Please make sure you're on Protonix 40 mg daily for GI prophylaxis, Disp: 28 tablet, Rfl: 0  •  encorafenib (BRAFTOVI) 75 MG capsule, TAKE SIX CAPSULES BY MOUTH DAILY (APPROVED), Disp: , Rfl:   •  furosemide (LASIX) 20 mg tablet, Take 1 tablet (20 mg total) by mouth daily (Patient not taking: Reported on 12/20/2022), Disp: 5 tablet, Rfl: 0  •  hydrochlorothiazide (HYDRODIURIL) 25 mg tablet, Take 1 tablet (25 mg total) by mouth daily (Patient taking differently: Take 25 mg by mouth every morning), Disp: 30 tablet, Rfl: 5  •  metoprolol succinate (TOPROL-XL) 25 mg 24 hr tablet, Take 0 5 tablets (12 5 mg total) by mouth daily (Patient taking differently: Take 12 5 mg by mouth every morning), Disp: 30 tablet, Rfl: 5  •  ondansetron (ZOFRAN) 4 mg tablet, Take 1 tablet (4 mg total) by mouth every 8 (eight) hours as needed for nausea or vomiting (Patient not taking: Reported on 3/7/2023), Disp: 20 tablet, Rfl: 0  •  pantoprazole (PROTONIX) 40 mg tablet, Take 1 tablet (40 mg total) by mouth daily (Patient taking differently: Take 40 mg by mouth every morning), Disp: 90 tablet, Rfl: 3  •  pantoprazole (PROTONIX) 40 mg tablet, Take 1 tablet (40 mg total) by mouth daily, Disp: 30 tablet, Rfl: 1  •  potassium chloride (K-DUR,KLOR-CON) 20 mEq tablet, Take 1 tablet (20 mEq total) by mouth daily (Patient taking differently: Take 20 mEq by mouth every morning), Disp: 30 tablet, Rfl: 3  •  prazosin (MINIPRESS) 1 mg capsule, Take 1 mg by mouth daily at bedtime , Disp: , Rfl:   •  predniSONE 20 mg tablet, Take 1 tablet (20 mg total) by mouth daily, Disp: 30 tablet, Rfl: 1  •  sodium chloride, PF, 0 9 %, 10 mL by Intracatheter route daily Intracatheter flushing daily  May substitute prefilled syringe with normal saline 10 mL vials, 10 mL syringes, and 18 g blunt needles, Disp: 300 mL, Rfl: 0  •  sulfamethoxazole-trimethoprim (BACTRIM DS) 800-160 mg per tablet, Take 1 tablet by mouth 3 (three) times a week Monday, Wednesday and Friday (Patient not taking: Reported on 2022), Disp: 12 tablet, Rfl: 0  •  VITAMIN D PO, Take 1,000 Units by mouth daily , Disp: , Rfl:   No Known Allergies   Pathology:  See above  Review of Systems  Refer to nursing note    OBJECTIVE:   /84   Pulse 80   Temp (!) 96 8 °F (36 °C)   Resp 20   Ht 5' 8" (1 727 m)   Wt 90 1 kg (198 lb 10 2 oz)   SpO2 95%   BMI 30 20 kg/m²   Pain Assessment:  0  Performance Status: ECO - Asymptomatic    Physical Exam  General Appearance:  Alert, cooperative, no distress, appears stated age  Lungs: Respirations unlabored, no cyanosis, able to speak in complete sentences without dyspnea  Skin: No generalized rash or dermatitis  Neurologic: ANOx3, CNII-XII intact  Strength 5/5 in UE and LE  speech and cognition intact  Portions of the record may have been created with voice recognition software  Occasional wrong word or "sound a like" substitutions may have occurred due to the inherent limitations of voice recognition software  Read the chart carefully and recognize, using context, where substitutions have occurred

## 2023-03-08 ENCOUNTER — DOCUMENTATION (OUTPATIENT)
Dept: RADIATION ONCOLOGY | Facility: CLINIC | Age: 68
End: 2023-03-08

## 2023-03-08 ENCOUNTER — APPOINTMENT (OUTPATIENT)
Dept: RADIATION ONCOLOGY | Facility: CLINIC | Age: 68
End: 2023-03-08

## 2023-03-08 NOTE — PROGRESS NOTES
SIM appt today---Pt  aware to hold Rx-Mektovi and Braftovi x 3 days prior to start of RT txmt per Med Onc recommendation  Probable med hold at end of RT tx--to be reviewed at end of RT txmt    Pt verbalizes understanding information above and in   agreement with plan     tmb

## 2023-03-09 NOTE — PRE-PROCEDURE INSTRUCTIONS
Pre-procedure Instructions for Interventional Radiology  [address]  [place] [address] [address] [phone]    You are scheduled for a/an Retroperitoneal mass biopsy  On Friday 3/17/23  Your tentative arrival time is 0830  Short stay will notify you the day before your procedure with the exact arrival time and the location to arrive  To prepare for your procedure:  1  Please arrange for someone to drive you home after the procedure and stay with you until the next morning if you are instructed to do so  This is typically for patients receiving some type of sedative or anesthetic for the procedure  2  DO NOT EAT OR DRINK ANYTHING after midnight on the evening before your procedure including candy & gum   3  ONLY SIPS OF WATER with your medications are allowed on the morning of your procedure  4  TAKE ALL OF YOUR REGULAR MEDICATIONS THE MORNING OF YOUR PROCEDURE with sips of water! We may call you to stop some of your blood sugar, blood pressure and blood thinning medications depending on the procedure  Please take all of these medications unless we instruct you to stop them  5  If you have an allergy to x-ray dye, please contact Interventional Radiology for an x-ray dye preparation which usually consists of an oral steroid and Benadryl  The day of your procedure:  1  Bring a list of the medications you take at home  2  Bring medications you take for breathing problems (such as inhalers), medications for chest pain, or both  3  Bring a case for your glasses or contacts  4  Bring your insurance card and a form of photo ID   5  Please leave all valuables such as credit cards and jewelry at home  6  Report to the registration desk in the main lobby at the [facility]  Ask to be directed to [facility]    7  While your procedure is being performed, your family may wait in the Radiology Waiting Room on the 1st floor in Radiology  if they need to leave, they may provide a number to be called following the procedure  8  Be prepared to stay overnight just in case  Sometimes procedures will indicate the need for further observation or treatment  9  If you are scheduled for a follow-up visit with the Interventional Radiologist after your procedure, you will be called with a date and time      Special Instructions (Medications to stop taking before your procedure etc ):

## 2023-03-10 ENCOUNTER — APPOINTMENT (OUTPATIENT)
Dept: LAB | Facility: MEDICAL CENTER | Age: 68
End: 2023-03-10

## 2023-03-10 ENCOUNTER — CLINICAL SUPPORT (OUTPATIENT)
Dept: URGENT CARE | Facility: MEDICAL CENTER | Age: 68
End: 2023-03-10

## 2023-03-10 DIAGNOSIS — C43.9 METASTATIC MELANOMA (HCC): ICD-10-CM

## 2023-03-10 DIAGNOSIS — Z79.899 HIGH RISK MEDICATION USE: ICD-10-CM

## 2023-03-10 LAB
ALBUMIN SERPL BCP-MCNC: 3.7 G/DL (ref 3.5–5)
ALP SERPL-CCNC: 79 U/L (ref 46–116)
ALT SERPL W P-5'-P-CCNC: 45 U/L (ref 12–78)
ANION GAP SERPL CALCULATED.3IONS-SCNC: 10 MMOL/L (ref 4–13)
AST SERPL W P-5'-P-CCNC: 14 U/L (ref 5–45)
ATRIAL RATE: 62 BPM
BASOPHILS # BLD AUTO: 0.03 THOUSANDS/ÂΜL (ref 0–0.1)
BASOPHILS NFR BLD AUTO: 0 % (ref 0–1)
BILIRUB SERPL-MCNC: 1.04 MG/DL (ref 0.2–1)
BUN SERPL-MCNC: 33 MG/DL (ref 5–25)
CALCIUM SERPL-MCNC: 9.6 MG/DL (ref 8.3–10.1)
CHLORIDE SERPL-SCNC: 105 MMOL/L (ref 96–108)
CO2 SERPL-SCNC: 24 MMOL/L (ref 21–32)
CREAT SERPL-MCNC: 1.4 MG/DL (ref 0.6–1.3)
EOSINOPHIL # BLD AUTO: 0.01 THOUSAND/ÂΜL (ref 0–0.61)
EOSINOPHIL NFR BLD AUTO: 0 % (ref 0–6)
ERYTHROCYTE [DISTWIDTH] IN BLOOD BY AUTOMATED COUNT: 16.4 % (ref 11.6–15.1)
GFR SERPL CREATININE-BSD FRML MDRD: 51 ML/MIN/1.73SQ M
GLUCOSE SERPL-MCNC: 157 MG/DL (ref 65–140)
HCT VFR BLD AUTO: 34.2 % (ref 36.5–49.3)
HGB BLD-MCNC: 11.9 G/DL (ref 12–17)
IMM GRANULOCYTES # BLD AUTO: 0.23 THOUSAND/UL (ref 0–0.2)
IMM GRANULOCYTES NFR BLD AUTO: 1 % (ref 0–2)
LDH SERPL-CCNC: 250 U/L (ref 81–234)
LYMPHOCYTES # BLD AUTO: 1.88 THOUSANDS/ÂΜL (ref 0.6–4.47)
LYMPHOCYTES NFR BLD AUTO: 9 % (ref 14–44)
MCH RBC QN AUTO: 40.9 PG (ref 26.8–34.3)
MCHC RBC AUTO-ENTMCNC: 34.8 G/DL (ref 31.4–37.4)
MCV RBC AUTO: 118 FL (ref 82–98)
MONOCYTES # BLD AUTO: 1.84 THOUSAND/ÂΜL (ref 0.17–1.22)
MONOCYTES NFR BLD AUTO: 9 % (ref 4–12)
NEUTROPHILS # BLD AUTO: 16.14 THOUSANDS/ÂΜL (ref 1.85–7.62)
NEUTS SEG NFR BLD AUTO: 81 % (ref 43–75)
NRBC BLD AUTO-RTO: 1 /100 WBCS
P AXIS: 50 DEGREES
PLATELET # BLD AUTO: 675 THOUSANDS/UL (ref 149–390)
PMV BLD AUTO: 10 FL (ref 8.9–12.7)
POTASSIUM SERPL-SCNC: 3.7 MMOL/L (ref 3.5–5.3)
PR INTERVAL: 144 MS
PROT SERPL-MCNC: 6.7 G/DL (ref 6.4–8.4)
QRS AXIS: 19 DEGREES
QRSD INTERVAL: 90 MS
QT INTERVAL: 410 MS
QTC INTERVAL: 416 MS
RBC # BLD AUTO: 2.91 MILLION/UL (ref 3.88–5.62)
SODIUM SERPL-SCNC: 139 MMOL/L (ref 135–147)
T WAVE AXIS: 14 DEGREES
VENTRICULAR RATE: 62 BPM
WBC # BLD AUTO: 20.13 THOUSAND/UL (ref 4.31–10.16)

## 2023-03-13 DIAGNOSIS — C79.31 METASTASIS TO BRAIN (HCC): ICD-10-CM

## 2023-03-13 DIAGNOSIS — C43.9 METASTATIC MELANOMA (HCC): ICD-10-CM

## 2023-03-13 RX ORDER — DEXAMETHASONE 4 MG/1
4 TABLET ORAL EVERY 6 HOURS
Qty: 28 TABLET | Refills: 0 | Status: SHIPPED | OUTPATIENT
Start: 2023-03-13 | End: 2023-03-22 | Stop reason: SDUPTHER

## 2023-03-13 NOTE — TELEPHONE ENCOUNTER
Called and spoke with patient  All questions answered  He states he is scheduled to start radiation on Thursday and was instructed to start holding his Braftovi and Mektovi today  Patient is aware we will let him know when to restart this  Patient is out of dexamethasone and is requesting a refill   Will send refill to [address] [place] [address] per patient request

## 2023-03-13 NOTE — TELEPHONE ENCOUNTER
Patient Call Form   Reason for patient call? Patient is calling in requesting a call back regarding medication questions that he has    Patient's primary oncologist? Dr Natanael Hernandez    Name of person the patient was calling for? Lacie Yeager   Does the patient issue require a call back? Yes   If call back required, inform patient that the message will be forwarded to the team and someone will get back to them as soon as possible    Did you relay this information to the patient?  Yes, patient can be reached back at [phone]

## 2023-03-16 ENCOUNTER — APPOINTMENT (OUTPATIENT)
Dept: RADIATION ONCOLOGY | Facility: CLINIC | Age: 68
End: 2023-03-16
Attending: INTERNAL MEDICINE

## 2023-03-17 ENCOUNTER — HOSPITAL ENCOUNTER (OUTPATIENT)
Dept: RADIOLOGY | Facility: HOSPITAL | Age: 68
Discharge: HOME | End: 2023-03-17
Attending: RADIOLOGY

## 2023-03-17 VITALS
BODY MASS INDEX: 30.31 KG/M2 | HEART RATE: 63 BPM | SYSTOLIC BLOOD PRESSURE: 161 MMHG | TEMPERATURE: 98 F | RESPIRATION RATE: 18 BRPM | WEIGHT: 200 LBS | HEIGHT: 68 IN | DIASTOLIC BLOOD PRESSURE: 80 MMHG | OXYGEN SATURATION: 97 %

## 2023-03-17 DIAGNOSIS — C43.9 MALIGNANT MELANOMA, UNSPECIFIED SITE (HCC): ICD-10-CM

## 2023-03-17 LAB
ANION GAP SERPL CALCULATED.3IONS-SCNC: 6 MMOL/L (ref 4–13)
ANISOCYTOSIS BLD QL SMEAR: PRESENT
BASOPHILS # BLD MANUAL: 0 THOUSAND/UL (ref 0–0.1)
BASOPHILS NFR MAR MANUAL: 0 % (ref 0–1)
BUN SERPL-MCNC: 28 MG/DL (ref 5–25)
CALCIUM SERPL-MCNC: 8.9 MG/DL (ref 8.3–10.1)
CHLORIDE SERPL-SCNC: 104 MMOL/L (ref 96–108)
CO2 SERPL-SCNC: 26 MMOL/L (ref 21–32)
CREAT SERPL-MCNC: 1.23 MG/DL (ref 0.6–1.3)
EOSINOPHIL # BLD MANUAL: 0 THOUSAND/UL (ref 0–0.4)
EOSINOPHIL NFR BLD MANUAL: 0 % (ref 0–6)
ERYTHROCYTE [DISTWIDTH] IN BLOOD BY AUTOMATED COUNT: 15.4 % (ref 11.6–15.1)
GFR SERPL CREATININE-BSD FRML MDRD: 59 ML/MIN/1.73SQ M
GIANT PLATELETS BLD QL SMEAR: PRESENT
GLUCOSE P FAST SERPL-MCNC: 141 MG/DL (ref 65–99)
GLUCOSE SERPL-MCNC: 141 MG/DL (ref 65–140)
HCT VFR BLD AUTO: 36.8 % (ref 36.5–49.3)
HGB BLD-MCNC: 12 G/DL (ref 12–17)
HOWELL-JOLLY BOD BLD QL SMEAR: PRESENT
INR PPP: 1.08 (ref 0.84–1.19)
LYMPHOCYTES # BLD AUTO: 1.7 THOUSAND/UL (ref 0.6–4.47)
LYMPHOCYTES # BLD AUTO: 9 % (ref 14–44)
MACROCYTES BLD QL AUTO: PRESENT
MCH RBC QN AUTO: 37.4 PG (ref 26.8–34.3)
MCHC RBC AUTO-ENTMCNC: 32.6 G/DL (ref 31.4–37.4)
MCV RBC AUTO: 115 FL (ref 82–98)
MONOCYTES # BLD AUTO: 1.33 THOUSAND/UL (ref 0–1.22)
MONOCYTES NFR BLD: 7 % (ref 4–12)
NEUTROPHILS # BLD MANUAL: 15.14 THOUSAND/UL (ref 1.85–7.62)
NEUTS BAND NFR BLD MANUAL: 1 % (ref 0–8)
NEUTS SEG NFR BLD AUTO: 79 % (ref 43–75)
NRBC BLD AUTO-RTO: 3 /100 WBC (ref 0–2)
PLATELET # BLD AUTO: 441 THOUSANDS/UL (ref 149–390)
PLATELET BLD QL SMEAR: ABNORMAL
PLATELET CLUMP BLD QL SMEAR: PRESENT
PMV BLD AUTO: 10.2 FL (ref 8.9–12.7)
POLYCHROMASIA BLD QL SMEAR: PRESENT
POTASSIUM SERPL-SCNC: 4.2 MMOL/L (ref 3.5–5.3)
PROTHROMBIN TIME: 14.2 SECONDS (ref 11.6–14.5)
RBC # BLD AUTO: 3.21 MILLION/UL (ref 3.88–5.62)
RBC MORPH BLD: PRESENT
SODIUM SERPL-SCNC: 136 MMOL/L (ref 135–147)
VARIANT LYMPHS # BLD AUTO: 4 %
WBC # BLD AUTO: 18.93 THOUSAND/UL (ref 4.31–10.16)

## 2023-03-17 RX ORDER — LIDOCAINE HYDROCHLORIDE 10 MG/ML
INJECTION, SOLUTION EPIDURAL; INFILTRATION; INTRACAUDAL; PERINEURAL AS NEEDED
Status: COMPLETED | OUTPATIENT
Start: 2023-03-17 | End: 2023-03-17

## 2023-03-17 RX ORDER — FENTANYL CITRATE 50 UG/ML
INJECTION, SOLUTION INTRAMUSCULAR; INTRAVENOUS AS NEEDED
Status: COMPLETED | OUTPATIENT
Start: 2023-03-17 | End: 2023-03-17

## 2023-03-17 RX ORDER — SODIUM CHLORIDE 9 MG/ML
75 INJECTION, SOLUTION INTRAVENOUS CONTINUOUS
Status: DISCONTINUED | OUTPATIENT
Start: 2023-03-17 | End: 2023-03-18 | Stop reason: HOSPADM

## 2023-03-17 RX ORDER — MIDAZOLAM HYDROCHLORIDE 2 MG/2ML
INJECTION, SOLUTION INTRAMUSCULAR; INTRAVENOUS AS NEEDED
Status: COMPLETED | OUTPATIENT
Start: 2023-03-17 | End: 2023-03-17

## 2023-03-17 RX ORDER — ACETAMINOPHEN 325 MG/1
650 TABLET ORAL EVERY 4 HOURS PRN
Status: DISCONTINUED | OUTPATIENT
Start: 2023-03-17 | End: 2023-03-18 | Stop reason: HOSPADM

## 2023-03-17 RX ADMIN — FENTANYL CITRATE 25 MCG: 50 INJECTION, SOLUTION INTRAMUSCULAR; INTRAVENOUS at 09:55

## 2023-03-17 RX ADMIN — MIDAZOLAM 0.5 MG: 1 INJECTION INTRAMUSCULAR; INTRAVENOUS at 09:55

## 2023-03-17 RX ADMIN — MIDAZOLAM 0.5 MG: 1 INJECTION INTRAMUSCULAR; INTRAVENOUS at 10:05

## 2023-03-17 RX ADMIN — MIDAZOLAM 0.5 MG: 1 INJECTION INTRAMUSCULAR; INTRAVENOUS at 10:00

## 2023-03-17 RX ADMIN — FENTANYL CITRATE 25 MCG: 50 INJECTION, SOLUTION INTRAMUSCULAR; INTRAVENOUS at 10:00

## 2023-03-17 RX ADMIN — FENTANYL CITRATE 25 MCG: 50 INJECTION, SOLUTION INTRAMUSCULAR; INTRAVENOUS at 10:09

## 2023-03-17 RX ADMIN — LIDOCAINE HYDROCHLORIDE 10 ML: 10 INJECTION, SOLUTION EPIDURAL; INFILTRATION; INTRACAUDAL; PERINEURAL at 10:03

## 2023-03-17 RX ADMIN — FENTANYL CITRATE 50 MCG: 50 INJECTION, SOLUTION INTRAMUSCULAR; INTRAVENOUS at 09:48

## 2023-03-17 RX ADMIN — FENTANYL CITRATE 25 MCG: 50 INJECTION, SOLUTION INTRAMUSCULAR; INTRAVENOUS at 10:05

## 2023-03-17 RX ADMIN — MIDAZOLAM 1 MG: 1 INJECTION INTRAMUSCULAR; INTRAVENOUS at 09:48

## 2023-03-17 RX ADMIN — MIDAZOLAM 0.5 MG: 1 INJECTION INTRAMUSCULAR; INTRAVENOUS at 10:09

## 2023-03-17 NOTE — BRIEF OP NOTE (RAD/CATH)
INTERVENTIONAL RADIOLOGY PROCEDURE NOTE    Date: 3/17/2023    Procedure:   Procedure Summary     Date: 03/17/23 Room / Location: [facility] CAT Scan    Anesthesia Start:  Anesthesia Stop:     Procedure: IR BIOPSY RETROPERITONEUM Diagnosis:       Malignant melanoma, unspecified site ([address] )      (melanoma w/ widespread PET avid disease)    Scheduled Providers:  Responsible Provider:     Anesthesia Type: Not recorded ASA Status: Not recorded          Preoperative diagnosis:   1  Malignant melanoma, unspecified site [facility])         Postoperative diagnosis: Same  Surgeon: Saint Opoka, MD     Assistant: None  No qualified resident was available  Blood loss: None    Specimens: 4 core needle samples placed into formalin  Findings: Successful right retroperitoneal soft tissue mass biopsy  Complications: None immediate      Anesthesia: conscious sedation and local

## 2023-03-17 NOTE — DISCHARGE INSTRUCTIONS
POST BIOPSY    Care after your procedure:    1  Limit your activities for 24 hours after your biopsy  2  No driving day of biopsy  3  Return to your normal diet  Small sips of flat soda will help with mild nausea  4  Remove band-aid or dressing 24 hours after procedure  Contact Interventional Radiology at [phone] Viktoriya PATIENTS: Contact Interventional Radiology at [phone]) Hoang Cm PATIENTS: Contact Interventional Radiology at [phone]) if:    1  Difficulty breathing, nausea or vomiting  2  Chills or fever above 101 degrees F      3  Pain at biopsy site not relieved by medication  4  Develop any redness, swelling, heat, unusual drainage, heavy bruising or bleeding from biopsy site  Moderate Sedation   WHAT YOU NEED TO KNOW:   Moderate sedation, or conscious sedation, is medicine used during procedures to help you feel relaxed and calm  You will be awake and able to follow directions without anxiety or pain  You will remember little to none of the procedure  You may feel tired, weak, or unsteady on your feet after you get sedation  You may also have trouble concentrating or short-term memory loss  These symptoms should go away in 24 hours or less  DISCHARGE INSTRUCTIONS:   Call 911 or have someone else call for any of the following: You have sudden trouble breathing  You cannot be woken  Seek care immediately if:   You have a severe headache or dizziness  Your heart is beating faster than usual   Contact your healthcare provider if:   You have a fever  You have nausea or are vomiting for more than 8 hours after the procedure  Your skin is itchy, swollen, or you have a rash  You have questions or concerns about your condition or care  Self-care:   Have someone stay with you for 24 hours  This person can drive you to errands and help you do things around the house  This person can also watch for problems        Rest and do quiet activities for 24 hours  Do not exercise, ride a bike, or play sports  Stand up slowly to prevent dizziness and falls  Take short walks around the house with another person  Slowly return to your usual activities the next day  Do not drive or use dangerous machines or tools for 24 hours  You may injure yourself or others  Examples include a lawnmower, saw, or drill  Do not return to work for 24 hours if you use dangerous machines or tools for work  Do not make important decisions for 24 hours  For example, do not sign important papers or invest money  Drink liquids as directed  Liquids help flush the sedation medicine out of your body  Ask how much liquid to drink each day and which liquids are best for you  Eat small, frequent meals to prevent nausea and vomiting  Start with clear liquids such as juice or broth  If you do not vomit after clear liquids, you can eat your usual foods  Do not drink alcohol or take medicines that make you drowsy  This includes medicines that help you sleep and anxiety medicines  Ask your healthcare provider if it is safe for you to take pain medicine  Follow up with your healthcare provider as directed: Write down your questions so you remember to ask them during your visits  © 2017 [facility] Information is for End User's use only and may not be sold, redistributed or otherwise used for commercial purposes  All illustrations and images included in CareNotes® are the copyrighted property of [employer]  or Juan Schaefer  The above information is an  only  It is not intended as medical advice for individual conditions or treatments  Talk to your doctor, nurse or pharmacist before following any medical regimen to see if it is safe and effective for you

## 2023-03-17 NOTE — H&P
Interventional Radiology  History and Physical 3/17/2023     Savanna Amador   1955   [id number]    Assessment/Plan:  75year old male with history of metastatic melanoma found to have right retroperitoneal soft tissue nodule presents for biopsy  Problem List Items Addressed This Visit        Other    Melanoma ([facility] )    Relevant Orders    IR biopsy retroperitoneum          Subjective:     Patient ID: Savanna Amador is a 75 y o  male  History of Present Illness  Patient with history of metastatic melanoma found to have right retroperitoneal soft tissue nodule presents for biopsy        Review of Systems      Past Medical History:   Diagnosis Date   • Anemia 11/02/2016   • Anxiety    • Arthritis    • Autoimmune hemolytic anemia ([facility] )    • Claustrophobia    • COVID 12/2020   • DVT (deep venous thrombosis) (HCC)    • GERD (gastroesophageal reflux disease)    • Hearing loss, right    • Hemolytic anemia (HCC)    • History of transfusion     2018 - no adverse reaction   • Hypertension    • [facility] 07/01/2022   • Palpitation    • Portal vein thrombosis    • PTSD (post-traumatic stress disorder)    • Pulmonary emboli (HCC)    • Squamous cell skin cancer 12/20/2022    SCCIS- 12/6/22   • Tobacco abuse         Past Surgical History:   Procedure Laterality Date   • CATARACT EXTRACTION Bilateral    • CHOLECYSTECTOMY  07/18/2017   • COLONOSCOPY     • ELBOW ARTHROPLASTY Left     bursectomy   • IR DRAINAGE TUBE CHECK WITH SCLEROSIS  10/06/2022   • IR DRAINAGE TUBE CHECK WITH SCLEROSIS  10/10/2022   • IR DRAINAGE TUBE CHECK WITH SCLEROSIS  10/20/2022   • IR DRAINAGE TUBE CHECK WITH SCLEROSIS  10/27/2022   • IR DRAINAGE TUBE CHECK WITH SCLEROSIS  11/04/2022   • IR DRAINAGE TUBE CHECK WITH SCLEROSIS  11/15/2022   • IR DRAINAGE TUBE CHECK WITH SCLEROSIS  11/25/2022   • IR DRAINAGE TUBE PLACEMENT  09/19/2022   • JOINT REPLACEMENT Right 02/02/2021    knee   • KNEE SURGERY Right     meniscus tear • LYMPH NODE BIOPSY Right 07/29/2022    Procedure: BIOPSY LYMPH NODE SENTINEL;  Surgeon: Kimberli Dutton MD;  [facility]: [facility];  Service: Surgical Oncology   • MOHS SURGERY Right 12/20/2022    Right dorsal hand SCCIS-Dr Isabel   • MO ARTHRP KNE CONDYLE&PLATU MEDIAL&LAT COMPARTMENTS Right 02/02/2021    Procedure: ARTHROPLASTY KNEE TOTAL;  Surgeon: Jerod Starr MD;  Location[facility];  Service: Orthopedics   • MO ARTHRP KNE CONDYLE&PLATU MEDIAL&LAT COMPARTMENTS Left 11/17/2021    Procedure: TOTAL KNEE REPLACEMENT;  Surgeon: Jerod Starr MD;  [facility];  Service: Orthopedics   • MO LAPS SURG CHOLECYSTECTOMY Maryruth Fairly N/A 12/23/2017    Procedure: CHOLECYSTECTOMY LAPAROSCOPIC with cholangiogram;  Surgeon: Alfredo Patel MD;  [facility];  Service: General   • MO SPLENECTOMY TOTAL SEPARATE PROCEDURE N/A 05/18/2017    Procedure: LAPAROSCOPIC HAND ASSIST SPLENECTOMY;  Surgeon: Marilia Palmer MD;  [facility]: [facility];  Service: Surgical Oncology   • SHOULDER SURGERY Left     rotator cuff x4, reconstruction   • SKIN BIOPSY  5 May 2022   • SKIN CANCER EXCISION  29 July 2022   • SKIN LESION EXCISION Right 07/29/2022    Procedure: WIDE EXCISION RIGHT MEDIAL THIGH;  Surgeon: Kimberli Dutton MD;  Location: [facility];  Service: Surgical Oncology        Social History     Tobacco Use   Smoking Status Some Days   • Packs/day: 0 00   • Years: 5 00   • Pack years: 0 00   • Types: Cigars, Cigarettes   Smokeless Tobacco Current   • Types: Snuff   Tobacco Comments    5  a week average        Social History     Substance and Sexual Activity   Alcohol Use Yes   • Alcohol/week: 6 0 standard drinks   • Types: 6 Cans of beer per week    Comment: socially        Social History     Substance and Sexual Activity   Drug Use Yes   • Types: Marijuana    Comment: medical marijuana        No Known Allergies    Current Outpatient Medications   Medication Sig Dispense Refill   • acetaminophen (TYLENOL) 325 mg tablet Take 2 tablets (650 mg total) by mouth every 6 (six) hours as needed for mild pain  0   • ALPRAZolam (XANAX) 0 5 mg tablet Take 1 tablet (0 5 mg total) by mouth 2 (two) times a day Take one tablet one hour before scan and bring the second one with you to take right before the MRI if needed 2 tablet 0   • benzonatate (TESSALON PERLES) 100 mg capsule Take 1 capsule (100 mg total) by mouth 3 (three) times a day 20 capsule 0   • binimetinib (MEKTOVI) 15 MG tablet TAKE THREE TABLETS BY MOUTH EVERY 12 HOURS (APPROVED)     • dabigatran etexilate (PRADAXA) 150 mg capsu Take 1 capsule (150 mg total) by mouth every 12 (twelve) hours 60 capsule 0   • dexamethasone (DECADRON) 4 mg tablet Take 1 tablet (4 mg total) by mouth every 6 (six) hours Please make sure you're on Protonix 40 mg daily for GI prophylaxis 28 tablet 0   • encorafenib (BRAFTOVI) 75 MG capsule TAKE SIX CAPSULES BY MOUTH DAILY (APPROVED)     • furosemide (LASIX) 20 mg tablet Take 1 tablet (20 mg total) by mouth daily 5 tablet 0   • hydrochlorothiazide (HYDRODIURIL) 25 mg tablet Take 1 tablet (25 mg total) by mouth daily (Patient taking differently: Take 25 mg by mouth every morning) 30 tablet 5   • memantine (NAMENDA TITRATION PACK) Follow package directions and titration schedule reviewed at consultation  Call Radiation Oncology with questions   49 tablet 0   • metoprolol succinate (TOPROL-XL) 25 mg 24 hr tablet Take 0 5 tablets (12 5 mg total) by mouth daily (Patient taking differently: Take 12 5 mg by mouth every morning) 30 tablet 5   • pantoprazole (PROTONIX) 40 mg tablet Take 1 tablet (40 mg total) by mouth daily (Patient taking differently: Take 40 mg by mouth every morning) 90 tablet 3   • pantoprazole (PROTONIX) 40 mg tablet Take 1 tablet (40 mg total) by mouth daily 30 tablet 1   • prazosin (MINIPRESS) 1 mg capsule Take 1 mg by mouth daily at bedtime      • predniSONE 20 mg tablet Take 1 tablet (20 mg total) by mouth daily 30 tablet 1   • sulfamethoxazole-trimethoprim (BACTRIM DS) 800-160 mg per tablet Take 1 tablet by mouth 3 (three) times a week Monday, Wednesday and Friday 12 tablet 0   • VITAMIN D PO Take 1,000 Units by mouth daily      • ascorbic acid (VITAMIN C) 1000 MG tablet Take 1 tablet (1,000 mg total) by mouth every 12 (twelve) hours for 6 doses (Patient taking differently: Take 1,000 mg by mouth daily Every other day) 6 tablet 0   • ondansetron (ZOFRAN) 4 mg tablet Take 1 tablet (4 mg total) by mouth every 8 (eight) hours as needed for nausea or vomiting (Patient not taking: Reported on 3/7/2023) 20 tablet 0   • potassium chloride (K-DUR,KLOR-CON) 20 mEq tablet Take 1 tablet (20 mEq total) by mouth daily (Patient taking differently: Take 20 mEq by mouth every morning) 30 tablet 3   • sodium chloride, PF, 0 9 % 10 mL by Intracatheter route daily Intracatheter flushing daily  May substitute prefilled syringe with normal saline 10 mL vials, 10 mL syringes, and 18 g blunt needles 300 mL 0     Current Facility-Administered Medications   Medication Dose Route Frequency Provider Last Rate Last Admin   • sodium chloride 0 9 % infusion  75 mL/hr Intravenous Continuous Kamar Morales DO              Objective:    Vitals:    03/17/23 0835   BP: 157/83   BP Location: Right arm   Pulse: 80   Resp: 16   Temp: 98 4 °F (36 9 °C)   TempSrc: Temporal   SpO2: 97%   Weight: 90 7 kg (200 lb)   Height: 5' 8" (1 727 m)        Physical Exam  Constitutional:       Appearance: Normal appearance  Cardiovascular:      Rate and Rhythm: Normal rate     Pulmonary:      Effort: Pulmonary effort is normal            No results found for: BNP   Lab Results   Component Value Date    WBC 18 93 (H) 03/17/2023    HGB 12 0 03/17/2023    HCT 36 8 03/17/2023     (H) 03/17/2023     (H) 03/17/2023     Lab Results   Component Value Date    INR 1 08 03/17/2023    INR 1 02 07/31/2022    INR 1 03 01/08/2021    PROTIME 14 2 03/17/2023    PROTIME 13 6 07/31/2022    PROTIME 13 5 01/08/2021     Lab Results   Component Value Date    PTT 20 (L) 08/05/2022         I have personally reviewed pertinent imaging and laboratory results  Code Status: Prior  Advance Directive and Living Will:      Power of :    POLST:      This text is generated with voice recognition software  There may be translation, syntax,  or grammatical errors  If you have any questions, please contact the dictating provider

## 2023-03-17 NOTE — SEDATION DOCUMENTATION
Retroperitoneum biopsy performed by Dr Erika Clemente  Patient tolerated well and vss throughout case  Specimens sent with pathology  Band-aid in place   IR Procedure Bedrest Start Time is 1020

## 2023-03-22 ENCOUNTER — OFFICE VISIT (OUTPATIENT)
Dept: HEMATOLOGY ONCOLOGY | Facility: CLINIC | Age: 68
End: 2023-03-22

## 2023-03-22 VITALS
SYSTOLIC BLOOD PRESSURE: 160 MMHG | RESPIRATION RATE: 16 BRPM | HEART RATE: 88 BPM | OXYGEN SATURATION: 98 % | WEIGHT: 204 LBS | DIASTOLIC BLOOD PRESSURE: 80 MMHG | BODY MASS INDEX: 30.92 KG/M2 | TEMPERATURE: 98.2 F | HEIGHT: 68 IN

## 2023-03-22 DIAGNOSIS — C79.31 METASTASIS TO BRAIN (HCC): ICD-10-CM

## 2023-03-22 DIAGNOSIS — C43.9 METASTATIC MELANOMA (HCC): ICD-10-CM

## 2023-03-22 DIAGNOSIS — C43.9 METASTATIC MELANOMA (HCC): Primary | ICD-10-CM

## 2023-03-22 DIAGNOSIS — Z79.899 HIGH RISK MEDICATION USE: ICD-10-CM

## 2023-03-22 DIAGNOSIS — D59.11 HEMOLYTIC ANEMIA DUE TO WARM ANTIBODY (HCC): ICD-10-CM

## 2023-03-22 RX ORDER — DEXAMETHASONE 4 MG/1
4 TABLET ORAL EVERY 6 HOURS
Qty: 28 TABLET | Refills: 0 | Status: SHIPPED | OUTPATIENT
Start: 2023-03-22

## 2023-03-22 NOTE — LETTER
March 25, 2023     Jessica Mancini, [address] [place] [address]    Patient: Alise Shi   YOB: 1955   Date of Visit: 3/22/2023       Dear Dr Nadja Loja: Thank you for referring Regi Hui to me for evaluation  Below are my notes for this consultation  If you have questions, please do not hesitate to call me  I look forward to following your patient along with you  Sincerely,        Puja Queen MD        CC: Mack Vazquez, MD Zack Ramirezne Morning, MD Puja Miller MD  3/25/2023 10:01 PM  Sign when Signing Visit  [address] [place] [address] 58  [phone]  [phone]     Date of Visit: 3/22/2023  Name: Alise Shi   YOB: 1955        Subjective    VISIT DIAGNOSIS:  Diagnoses and all orders for this visit:    Metastatic melanoma ([facility] )  -     ECG 12 lead; Future  -     CBC and differential; Future  -     Comprehensive metabolic panel; Future  -     LD,Blood; Future    Metastasis to brain [facility])  -     ECG 12 lead; Future  -     CBC and differential; Future  -     Comprehensive metabolic panel; Future  -     LD,Blood; Future    High risk medication use  -     ECG 12 lead; Future  -     CBC and differential; Future  -     Comprehensive metabolic panel; Future  -     LD,Blood; Future    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        Oncology History   Personal history of malignant melanoma   5/31/2022 Biopsy    Right Leg, Shave Biopsy   Melanoma extending to the deep specimen margin  Thickness: 7 0mm  Ulceration: not seen   Mitoses: 3      5/31/2022 -  Cancer Staged    Staging form: Melanoma of the Skin, AJCC 8th Edition  - Clinical stage from 5/31/2022: Stage IIB (cT4a, cN0, cM0) - Signed by Puja Queen MD on 8/25/2022 7/1/2022 Initial Diagnosis    Malignant melanoma of leg, right ([facility] )     7/29/2022 Surgery    A   Lymph node, sentinel, #1:  One lymph node with rare metastatic melanoma cells (1/1), subcapsular location  Immunohistochemical study for MART-1, HMB45 and S100 supports the findings  B  Leg, right, knee, wide excision:  Residual melanoma, nodular type, and scar; margins free  See synoptic report  7/29/2022 -  Cancer Staged    Staging form: Melanoma of the Skin, AJCC 8th Edition  - Pathologic stage from 7/29/2022: Stage IIIC (pT4a, pN1a, cM0) - Signed by Jacquelin Urbina MD on 8/25/2022 12/6/2022 Biopsy    Final Diagnosis   A  Skin, Right dorsal hand, Shave biopsy:  Squamous cell carcinoma in-situ, at least; extending to biopsy margins              Cancer Staging   Personal history of malignant melanoma  Staging form: Melanoma of the Skin, AJCC 8th Edition  - Clinical stage from 5/31/2022: Stage IIB (cT4a, cN0, cM0) - Signed by Jacquelin Urbina MD on 8/25/2022  - Pathologic stage from 7/29/2022: Stage IIIC (pT4a, pN1a, cM0) - Signed by Jacquelin Urbina MD on 8/25/2022     Treatment Details   Treatment goal [No plan goal]   Plan Name ONE-TIME BLOOD PRODUCT TRANSFUSION PLAN   Status Inactive   Start Date 7/15/2022   End Date 7/15/2022   Provider VISHNU Pena   Chemotherapy [No matching medication found in this treatment plan]     Treatment Details   Treatment goal Palliative   Plan Name OP RiTUXimab weekly x 4, every 6 months   Status Active   Start Date 8/1/2022   End Date 8/23/2022   Provider Amy Palomino DO   Chemotherapy riTUXimab (RITUXAN) subsequent titrated chemo infusion, 375 mg/m2 = 746 2 mg, Intravenous, Once, 1 of 1 cycle  Administration: 746 2 mg (8/9/2022), 746 2 mg (8/16/2022), 746 2 mg (8/23/2022)    riTUXimab (RITUXAN) first titrated chemo infusion, 375 mg/m2 = 746 2 mg, Intravenous, Once, 1 of 1 cycle  Administration: 746 2 mg (8/1/2022)          HISTORY OF PRESENT ILLNESS: Lin Santos is a 75 y o  male  who has newly diagnosed metastatic melanoma here for follow-up and monitoring and surveillance  We discussed that biopsy on 3/17/2023 confirmed that his widespread diffuse metastatic disease seen on imaging is metastatic melanoma  Hemolytic anemia requiring Rituxan so would like to defer treatment with immunotherapy if possible  He does have a BRAF mutation so he started treatment with encorafenib and binimetinib     He took it for short period of time before having to stop for whole brain radiation  He will finish whole brain radiation at the end of the month and hold until 5 days after  He will begin taking encorafenib and binimetinib on April 3  He will return for follow-up 2 weeks after that blood work and EKG    Lab work and EKG reviewed today and okay  He is doing all right and tolerating whole brain radiation therapy  He is little more fatigued at this time -he is USP through  REVIEW OF SYSTEMS:  Review of Systems   Constitutional: Positive for fatigue  Negative for appetite change, fever and unexpected weight change  HENT:   Negative for lump/mass  Eyes: Negative for icterus  Respiratory: Negative for cough, shortness of breath and wheezing  Cardiovascular: Negative for leg swelling  Gastrointestinal: Negative for abdominal pain, constipation, diarrhea, nausea and vomiting  Genitourinary: Negative for difficulty urinating and hematuria  Musculoskeletal: Negative for arthralgias, gait problem and myalgias  Skin: Negative for itching and rash  No new, changing, or concerning lesions  Neurological: Negative for extremity weakness, gait problem, headaches, light-headedness and numbness  Hematological: Negative for adenopathy          MEDICATIONS:    Current Outpatient Medications:   •  acetaminophen (TYLENOL) 325 mg tablet, Take 2 tablets (650 mg total) by mouth every 6 (six) hours as needed for mild pain, Disp:  , Rfl: 0  •  ALPRAZolam (XANAX) 0 5 mg tablet, Take 1 tablet (0 5 mg total) by mouth 2 (two) times a day Take one tablet one hour before scan and bring the second one with you to take right before the MRI if needed, Disp: 2 tablet, Rfl: 0  •  benzonatate (TESSALON PERLES) 100 mg capsule, Take 1 capsule (100 mg total) by mouth 3 (three) times a day, Disp: 20 capsule, Rfl: 0  •  binimetinib (MEKTOVI) 15 MG tablet, TAKE THREE TABLETS BY MOUTH EVERY 12 HOURS (APPROVED), Disp: , Rfl:   •  dabigatran etexilate (PRADAXA) 150 mg capsu, Take 1 capsule (150 mg total) by mouth every 12 (twelve) hours, Disp: 60 capsule, Rfl: 0  •  encorafenib (BRAFTOVI) 75 MG capsule, TAKE SIX CAPSULES BY MOUTH DAILY (APPROVED), Disp: , Rfl:   •  furosemide (LASIX) 20 mg tablet, Take 1 tablet (20 mg total) by mouth daily, Disp: 5 tablet, Rfl: 0  •  hydrochlorothiazide (HYDRODIURIL) 25 mg tablet, Take 1 tablet (25 mg total) by mouth daily (Patient taking differently: Take 25 mg by mouth every morning), Disp: 30 tablet, Rfl: 5  •  memantine (NAMENDA TITRATION PACK), Follow package directions and titration schedule reviewed at consultation  Call Radiation Oncology with questions  , Disp: 49 tablet, Rfl: 0  •  metoprolol succinate (TOPROL-XL) 25 mg 24 hr tablet, Take 0 5 tablets (12 5 mg total) by mouth daily (Patient taking differently: Take 12 5 mg by mouth every morning), Disp: 30 tablet, Rfl: 5  •  pantoprazole (PROTONIX) 40 mg tablet, Take 1 tablet (40 mg total) by mouth daily (Patient taking differently: Take 40 mg by mouth every morning), Disp: 90 tablet, Rfl: 3  •  pantoprazole (PROTONIX) 40 mg tablet, Take 1 tablet (40 mg total) by mouth daily, Disp: 30 tablet, Rfl: 1  •  potassium chloride (K-DUR,KLOR-CON) 20 mEq tablet, Take 1 tablet (20 mEq total) by mouth daily (Patient taking differently: Take 20 mEq by mouth every morning), Disp: 30 tablet, Rfl: 3  •  prazosin (MINIPRESS) 1 mg capsule, Take 1 mg by mouth daily at bedtime , Disp: , Rfl:   •  predniSONE 20 mg tablet, Take 1 tablet (20 mg total) by mouth daily, Disp: 30 tablet, Rfl: 1  • sulfamethoxazole-trimethoprim (BACTRIM DS) 800-160 mg per tablet, Take 1 tablet by mouth 3 (three) times a week Monday, Wednesday and Friday, Disp: 12 tablet, Rfl: 0  •  VITAMIN D PO, Take 1,000 Units by mouth daily , Disp: , Rfl:   •  ascorbic acid (VITAMIN C) 1000 MG tablet, Take 1 tablet (1,000 mg total) by mouth every 12 (twelve) hours for 6 doses (Patient taking differently: Take 1,000 mg by mouth daily Every other day), Disp: 6 tablet, Rfl: 0  •  dexamethasone (DECADRON) 4 mg tablet, Take 1 tablet (4 mg total) by mouth every 6 (six) hours Please make sure you're on Protonix 40 mg daily for GI prophylaxis, Disp: 28 tablet, Rfl: 0  •  ondansetron (ZOFRAN) 4 mg tablet, Take 1 tablet (4 mg total) by mouth every 8 (eight) hours as needed for nausea or vomiting (Patient not taking: Reported on 3/7/2023), Disp: 20 tablet, Rfl: 0  •  sodium chloride, PF, 0 9 %, 10 mL by Intracatheter route daily Intracatheter flushing daily   May substitute prefilled syringe with normal saline 10 mL vials, 10 mL syringes, and 18 g blunt needles, Disp: 300 mL, Rfl: 0     ALLERGIES:  No Known Allergies     ACTIVE PROBLEMS:  Patient Active Problem List   Diagnosis   • 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   • Hyperbilirubinemia   • Symptomatic anemia   • Pulmonary embolism with acute cor pulmonale (HCC)   • Portal vein thrombosis   • Elevated blood pressure reading without diagnosis of hypertension   • Shortness of breath   • Gastroesophageal reflux disease   • Autoimmune hemolytic anemia   • ARABELLA (acute kidney injury) ([facility] [address] )   • Splenomegaly   • Iron overload due to repeated red blood cell transfusions   • Posttraumatic stress disorder   • Essential hypertension   • Glucose intolerance (impaired glucose tolerance)   • Renal insufficiency   • Auto immune neutropenia (HCC)   • Primary osteoarthritis of left knee   • Pain in joint, lower leg   • Pain in left knee   • COVID-19   • Thrombocytosis   • Primary localized osteoarthrosis of the knee, right   • Hyperglycemia   • Chronic bilateral low back pain with bilateral sciatica   • Hypercholesterolemia   • Personal history of malignant melanoma   • Dyspnea   • Acute respiratory failure with hypoxia (HCC)   • Elevated serum creatinine   • Elevated bilirubin   • Leukocytosis   • Lymphocele after surgical procedure   • Encounter for follow-up surveillance of melanoma   • [facility] )          PAST MEDICAL HISTORY:   Past Medical History:   Diagnosis Date   • Anemia 11/02/2016   • Anxiety    • Arthritis    • Autoimmune hemolytic anemia ([facility] )    • Claustrophobia    • COVID 12/2020   • DVT (deep venous thrombosis) (HCC)    • GERD (gastroesophageal reflux disease)    • Hearing loss, right    • Hemolytic anemia (HCC)    • History of transfusion     2018 - no adverse reaction   • Hypertension    • Malignant melanoma of leg, right ([facility] [address] ) 07/01/2022   • Palpitation    • Portal vein thrombosis    • PTSD (post-traumatic stress disorder)    • Pulmonary emboli (HCC)    • Squamous cell skin cancer 12/20/2022    SCCIS- 12/6/22   • Tobacco abuse         PAST SURGICAL HISTORY:  Past Surgical History:   Procedure Laterality Date   • CATARACT EXTRACTION Bilateral    • CHOLECYSTECTOMY  07/18/2017   • COLONOSCOPY     • ELBOW ARTHROPLASTY Left     bursectomy   • IR BIOPSY RETROPERITONEUM  3/17/2023   • IR DRAINAGE TUBE CHECK WITH SCLEROSIS  10/06/2022   • IR DRAINAGE TUBE CHECK WITH SCLEROSIS  10/10/2022   • IR DRAINAGE TUBE CHECK WITH SCLEROSIS  10/20/2022   • IR DRAINAGE TUBE CHECK WITH SCLEROSIS  10/27/2022   • IR DRAINAGE TUBE CHECK WITH SCLEROSIS  11/04/2022   • IR DRAINAGE TUBE CHECK WITH SCLEROSIS  11/15/2022   • IR DRAINAGE TUBE CHECK WITH SCLEROSIS  11/25/2022   • IR DRAINAGE TUBE PLACEMENT  09/19/2022   • JOINT REPLACEMENT Right 02/02/2021    knee   • KNEE SURGERY Right     meniscus tear   • LYMPH NODE BIOPSY Right 07/29/2022    Procedure: BIOPSY LYMPH NODE SENTINEL;  Surgeon: Dorian Nava MD;  Location:  MAIN OR;  Service: Surgical Oncology   • MOHS SURGERY Right 12/20/2022    Right dorsal hand SCCIS-Dr Isabel   • UT ARTHRP KNE CONDYLE&PLATU MEDIAL&LAT COMPARTMENTS Right 02/02/2021    Procedure: ARTHROPLASTY KNEE TOTAL;  Surgeon: Tristian Espana MD;  Location[facility];  Service: Orthopedics   • UT ARTHRP KNE CONDYLE&PLATU MEDIAL&LAT COMPARTMENTS Left 11/17/2021    Procedure: TOTAL KNEE REPLACEMENT;  Surgeon: Tristian Espana MD;  [facility];  Service: Orthopedics   • UT LAPS SURG CHOLECYSTECTOMY Nonnie Potash N/A 12/23/2017    Procedure: CHOLECYSTECTOMY LAPAROSCOPIC with cholangiogram;  Surgeon: Ellen Arizmendi MD;  Location: [facility];  Service: General   • UT SPLENECTOMY TOTAL SEPARATE PROCEDURE N/A 05/18/2017    Procedure: LAPAROSCOPIC HAND ASSIST SPLENECTOMY;  Surgeon: Angely Alvarez MD;  Location: [facility] MAIN OR;  Service: Surgical Oncology   • SHOULDER SURGERY Left     rotator cuff x4, reconstruction   • SKIN BIOPSY  5 May 2022   • SKIN CANCER EXCISION  29 July 2022   • SKIN LESION EXCISION Right 07/29/2022    Procedure: WIDE EXCISION RIGHT MEDIAL THIGH;  Surgeon: Hannah Means MD;  Location: [facility];  Service: Surgical Oncology        SOCIAL HISTORY:  Social History     Socioeconomic History   • Marital status: /Civil Union     Spouse name: None   • Number of children: None   • Years of education: None   • Highest education level: None   Occupational History   • None   Tobacco Use   • Smoking status: Some Days     Packs/day: 0 00     Years: 5 00     Pack years: 0 00     Types: Cigars, Cigarettes   • Smokeless tobacco: Current     Types: Snuff   • Tobacco comments:     5  a week average   Vaping Use   • Vaping Use: Never used   Substance and Sexual Activity   • Alcohol use:  Yes     Alcohol/week: 6 0 standard drinks     Types: 6 Cans of beer per week     Comment: socially   • Drug use: Yes     Types: Marijuana     Comment: medical marijuana   • Sexual activity: Yes     Partners: Female Birth control/protection: None   Other Topics Concern   • None   Social History Narrative    Daily coffee consumption (2 cups/day)    reitred     Social Determinants of Health     Financial Resource Strain: Not on file   Food Insecurity: No Food Insecurity   • Worried About Running Out of Food in the Last Year: Never true   • Ran Out of Food in the Last Year: Never true   Transportation Needs: No Transportation Needs   • Lack of Transportation (Medical): No   • Lack of Transportation (Non-Medical): No   Physical Activity: Not on file   Stress: Not on file   Social Connections: Not on file   Intimate Partner Violence: Not on file   Housing Stability: Low Risk    • Unable to Pay for Housing in the Last Year: No   • Number of Places Lived in the Last Year: 1   • Unstable Housing in the Last Year: No        FAMILY HISTORY:  Family History   Problem Relation Age of Onset   • Cancer Mother    • Breast cancer Mother    • Other Father         CABG   • Heart attack Paternal Grandfather         acute MI           Objective    PHYSICAL EXAMINATION:   Blood pressure 160/80, pulse 88, temperature 98 2 °F (36 8 °C), temperature source Temporal, resp  rate 16, height 5' 8" (1 727 m), weight 92 5 kg (204 lb), SpO2 98 %  Pain Score: 0-No pain     ECOG Performance Status    Flowsheet Row Most Recent Value   ECOG Performance Status 1 - Restricted in physically strenuous activity but ambulatory and able to carry out work of a light or sedentary nature, e g , light house work, office work             Physical Exam  Constitutional:       General: He is not in acute distress  Appearance: Normal appearance  He is not toxic-appearing  HENT:      Mouth/Throat:      Mouth: Mucous membranes are moist       Pharynx: Oropharynx is clear  Eyes:      General: No scleral icterus  Cardiovascular:      Rate and Rhythm: Normal rate and regular rhythm  Pulses: Normal pulses  Heart sounds: No murmur heard  No friction rub  No gallop  Pulmonary:      Effort: Pulmonary effort is normal  No respiratory distress  Breath sounds: Normal breath sounds  No wheezing or rales  Abdominal:      General: There is no distension  Palpations: There is no mass  Tenderness: There is no abdominal tenderness  There is no rebound  Musculoskeletal:         General: No swelling or tenderness  Right lower leg: No edema  Left lower leg: No edema  Lymphadenopathy:      Head:      Right side of head: No submandibular, preauricular or posterior auricular adenopathy  Left side of head: No submandibular, preauricular or posterior auricular adenopathy  Cervical: No cervical adenopathy  Right cervical: No superficial or posterior cervical adenopathy  Left cervical: No superficial or posterior cervical adenopathy  Upper Body:      Right upper body: No supraclavicular or axillary adenopathy  Left upper body: No supraclavicular or axillary adenopathy  Skin:     Findings: No rash  Comments: No concerning skin lesions  Neurological:      General: No focal deficit present  Mental Status: He is alert and oriented to person, place, and time  Psychiatric:         Mood and Affect: Mood normal          Behavior: Behavior normal          Thought Content: Thought content normal          Judgment: Judgment normal          I reviewed lab data in the chart      WBC   Date Value Ref Range Status   03/17/2023 18 93 (H) 4 31 - 10 16 Thousand/uL Final   03/10/2023 20 13 (H) 4 31 - 10 16 Thousand/uL Final   03/03/2023 19 69 (H) 4 31 - 10 16 Thousand/uL Final     Hemoglobin   Date Value Ref Range Status   03/17/2023 12 0 12 0 - 17 0 g/dL Final   03/10/2023 11 9 (L) 12 0 - 17 0 g/dL Final   03/03/2023 11 6 (L) 12 0 - 17 0 g/dL Final     Platelets   Date Value Ref Range Status   03/17/2023 441 (H) 149 - 390 Thousands/uL Final   03/10/2023 675 (H) 149 - 390 Thousands/uL Final   03/03/2023 723 (H) 149 - 390 Thousands/uL Final     MCV   Date Value Ref Range Status   03/17/2023 115 (H) 82 - 98 fL Final   03/10/2023 118 (H) 82 - 98 fL Final   03/03/2023 122 (H) 82 - 98 fL Final      Potassium   Date Value Ref Range Status   03/17/2023 4 2 3 5 - 5 3 mmol/L Final     Comment:     Slightly Hemolyzed; Results May be Affected   03/10/2023 3 7 3 5 - 5 3 mmol/L Final   02/24/2023 3 3 (L) 3 5 - 5 3 mmol/L Final     Chloride   Date Value Ref Range Status   03/17/2023 104 96 - 108 mmol/L Final   03/10/2023 105 96 - 108 mmol/L Final   02/24/2023 105 96 - 108 mmol/L Final     CO2   Date Value Ref Range Status   03/17/2023 26 21 - 32 mmol/L Final   03/10/2023 24 21 - 32 mmol/L Final   02/24/2023 29 21 - 32 mmol/L Final     CO2, i-STAT   Date Value Ref Range Status   05/18/2017 26 21 - 32 mmol/L Final     BUN   Date Value Ref Range Status   03/17/2023 28 (H) 5 - 25 mg/dL Final   03/10/2023 33 (H) 5 - 25 mg/dL Final   02/24/2023 28 (H) 5 - 25 mg/dL Final     Creatinine   Date Value Ref Range Status   03/17/2023 1 23 0 60 - 1 30 mg/dL Final     Comment:     Standardized to IDMS reference method   03/10/2023 1 40 (H) 0 60 - 1 30 mg/dL Final     Comment:     Standardized to IDMS reference method   02/24/2023 1 40 (H) 0 60 - 1 30 mg/dL Final     Comment:     Standardized to IDMS reference method     Glucose   Date Value Ref Range Status   03/17/2023 141 (H) 65 - 140 mg/dL Final     Comment:     If the patient is fasting, the ADA then defines impaired fasting glucose as > 100 mg/dL and diabetes as > or equal to 123 mg/dL  Specimen collection should occur prior to Sulfasalazine administration due to the potential for falsely depressed results  Specimen collection should occur prior to Sulfapyridine administration due to the potential for falsely elevated results     03/10/2023 157 (H) 65 - 140 mg/dL Final     Comment:     If the patient is fasting, the ADA then defines impaired fasting glucose as > 100 mg/dL and diabetes as > or equal to 123 mg/dL  Specimen collection should occur prior to Sulfasalazine administration due to the potential for falsely depressed results  Specimen collection should occur prior to Sulfapyridine administration due to the potential for falsely elevated results  02/24/2023 141 (H) 65 - 140 mg/dL Final     Comment:     If the patient is fasting, the ADA then defines impaired fasting glucose as > 100 mg/dL and diabetes as > or equal to 123 mg/dL  Specimen collection should occur prior to Sulfasalazine administration due to the potential for falsely depressed results  Specimen collection should occur prior to Sulfapyridine administration due to the potential for falsely elevated results  Calcium   Date Value Ref Range Status   03/17/2023 8 9 8 3 - 10 1 mg/dL Final   03/10/2023 9 6 8 3 - 10 1 mg/dL Final   02/24/2023 9 8 8 3 - 10 1 mg/dL Final     Albumin   Date Value Ref Range Status   03/10/2023 3 7 3 5 - 5 0 g/dL Final   02/24/2023 4 0 3 5 - 5 0 g/dL Final   12/12/2022 4 0 3 5 - 5 0 g/dL Final     Total Bilirubin   Date Value Ref Range Status   03/10/2023 1 04 (H) 0 20 - 1 00 mg/dL Final     Comment:     Use of this assay is not recommended for patients undergoing treatment with eltrombopag due to the potential for falsely elevated results  02/24/2023 1 63 (H) 0 20 - 1 00 mg/dL Final     Comment:     Use of this assay is not recommended for patients undergoing treatment with eltrombopag due to the potential for falsely elevated results  12/12/2022 1 27 (H) 0 20 - 1 00 mg/dL Final     Comment:     Use of this assay is not recommended for patients undergoing treatment with eltrombopag due to the potential for falsely elevated results       Alkaline Phosphatase   Date Value Ref Range Status   03/10/2023 79 46 - 116 U/L Final   02/24/2023 92 46 - 116 U/L Final   12/12/2022 69 46 - 116 U/L Final     AST   Date Value Ref Range Status   03/10/2023 14 5 - 45 U/L Final     Comment:     Specimen collection should occur prior to Sulfasalazine administration due to the potential for falsely depressed results  02/24/2023 30 5 - 45 U/L Final     Comment:     Specimen collection should occur prior to Sulfasalazine administration due to the potential for falsely depressed results  12/12/2022 36 5 - 45 U/L Final     Comment:     Specimen collection should occur prior to Sulfasalazine administration due to the potential for falsely depressed results  ALT   Date Value Ref Range Status   03/10/2023 45 12 - 78 U/L Final     Comment:     Specimen collection should occur prior to Sulfasalazine and/or Sulfapyridine administration due to the potential for falsely depressed results  02/24/2023 44 12 - 78 U/L Final     Comment:     Specimen collection should occur prior to Sulfasalazine and/or Sulfapyridine administration due to the potential for falsely depressed results  12/12/2022 48 12 - 78 U/L Final     Comment:     Specimen collection should occur prior to Sulfasalazine and/or Sulfapyridine administration due to the potential for falsely depressed results  LD   Date Value Ref Range Status   03/10/2023 250 (H) 81 - 234 U/L Final   12/12/2022 331 (H) 81 - 234 U/L Final   11/28/2022 262 (H) 81 - 234 U/L Final     No results found for: TSH  No results found for: B1VLIBB   Free T4   Date Value Ref Range Status   11/28/2022 0 66 (L) 0 76 - 1 46 ng/dL Final     Comment:     Specimen collection should occur prior to Sulfasalazine administration due to the potential for falsely elevated results  RECENT IMAGING:  Procedure: MRI brain w wo contrast    Result Date: 3/4/2023  Narrative: MRI BRAIN WITH AND WITHOUT CONTRAST INDICATION: C79 31: Secondary malignant neoplasm of brain  History of metastatic melanoma  COMPARISON: Nuclear medicine PET CT imaging of the body March 2, 2023  MRI brain with and without contrast September 15, 2022   TECHNIQUE: Multiplanar, multisequence imaging of the brain was performed before and after gadolinium administration  IV Contrast:  9 mL of Gadobutrol injection (SINGLE-DOSE)  IMAGE QUALITY:   Diagnostic  FINDINGS: BRAIN PARENCHYMA: Too numerous to count intrinsically T1 hyperintense enhancing metastatic lesions in bilateral cerebral hemispheres and to a lesser extent in bilateral cerebellum with perilesional vasogenic edema (bilateral frontal, bilateral parietal, right subinsular, left temporal, and bilateral occipital lobes - worse in right frontal lobe)  3 reference lesions (measured in long axis), as detailed below: -1 2 cm right frontal lobe lesion (11:18)  -0 9 cm left frontal lobe lesion (11:15)  -0 6 cm left cerebellar lesion (11:90)  No acute intracranial hemorrhage  No acute infarction  Small chronic infarct in right occipital lobe  Small scattered hyperintensities on T2/FLAIR imaging are noted in the periventricular and subcortical white matter demonstrating an appearance that is statistically most likely to represent mild microangiopathic change  Small patchy T2 FLAIR hyperintense signal abnormality in kyler, likely chronic microangiopathy  VENTRICLES:  Normal for the patient's age  SELLA AND PITUITARY GLAND:  Normal  ORBITS:  Normal  PARANASAL SINUSES:  Normal  VASCULATURE:  Evaluation of the major intracranial vasculature demonstrates appropriate flow voids  CALVARIUM AND SKULL BASE:  Normal  MASSES: Small bilateral mastoid effusions (left worse than right)  EXTRACRANIAL SOFT TISSUES:  Normal      Impression: Too numerous to count metastatic lesions in bilateral cerebral hemispheres and to a lesser extent in bilateral cerebellum with perilesional vasogenic edema (bilateral frontal, bilateral parietal, right subinsular, left temporal, and bilateral occipital lobes - worse in right frontal lobe)  The study was marked in [facility] for immediate notification   Workstation performed: [id number]     Procedure: NM pet ct tumor imaging whole body    Result Date: 3/3/2023  Narrative: WHOLE-BODY PET/CT SCAN INDICATION: Metastatic melanoma of the right leg  Restaging exam  C43 71: Malignant melanoma of right lower limb, including hip    MODIFIER: PS COMPARISON: PET/CT 8/25/2022, MRI 9/15/2022 CELL TYPE:  Melanoma right leg TECHNIQUE:   10 9 mCi F-18-FD administered IV  Multiplanar attenuation corrected and non attenuation corrected PET images were acquired 60 minutes post injection  Contiguous, low dose, axial CT sections were obtained from the skull vertex through the feet  Intravenous contrast material was not utilized  This examination, like all CT scans performed in the [facility], was performed utilizing techniques to minimize radiation dose exposure, including the use of iterative reconstruction  and automated exposure control  Fasting serum glucose: 127 mg/dl FINDINGS: VISUALIZED BRAIN:   Scattered new small foci of radiotracer uptake in the bilateral cerebral hemispheres suspicious for metastasis  A focus in the right posterior parietal lobe demonstrates SUV max of 14  Additional foci suggested in the bilateral frontal lobes, left parietal occipital lobe medially and left posterior temporal lobe  These are best seen on series 12  Relative photopenia in the right frontal lobe question interval infarct  HEAD/NECK:   There is a physiologic distribution of FDG  No FDG avid cervical adenopathy is seen  CT images: Unremarkable  CHEST:   No FDG avid soft tissue lesions are seen  However scattered new subcentimeter pulmonary nodules  These are not FDG avid but may be too small to characterize by PET  A right lower lobe nodule centrally measures up to 5 mm in size, image 138 series 2  New 4 mm right lower lobe nodule anteriorly image 139 series 2  CT images: Largely resolved scattered subpleural densities bilaterally  ABDOMEN:   New mildly FDG avid retroperitoneal nodule just lateral to the right psoas, SUV max of 2 4  This measures 1 7 x 1 5 cm image 198 series 2   CT images: Colonic diverticulosis  Metallic material in the left upper quadrant  Small fat-containing umbilical hernia  PELVIS: Scattered new FDG avid lymph nodes along the proximal iliac chains  FDG avid lymph node at the bifurcation of the right external and iliac chain lymph node demonstrates SUV max of 3 7  This measures 9 mm short axis image 220 series 2  Multiple new FDG avid right inguinal lymph nodes and subcutaneous nodules extending along the right anteromedial thigh  Mild focus of uptake in the right inguinal region laterally demonstrates SUV max of 2 9  This measures up to 5 mm in size image 251 series 2  Subcutaneous nodule along the right anteromedial thigh demonstrates SUV max of 9  This measures 1 5 x 0 9 cm image 286 series 2  Mild FDG uptake associated with skin thickening and infiltrative changes noted distally along the right anterior medial thigh above the knee level likely related to post surgical changes  CT images: Small fat-containing bilateral inguinal hernias  Enlarged prostate  OSSEOUS STRUCTURES/EXTREMITIES: Multiple new FDG avid osseous metastasis  Some of these correspond to sclerotic lesions on CT  Reference lesions as noted below: Left manubrial lesion demonstrates SUV max of 11  Left T4 pedicle demonstrates SUV max of 6 3  L2 vertebral body on the left demonstrates SUV max of 6 3  Left posterior iliac bone lesion demonstrates SUV max of 8 8  Right iliac wing lesion centrally demonstrates SUV max of 3 5  Right sacral lesion demonstrates SUV max of 6 7  Additional scattered small foci identified  CT images: No additional significant findings  Impression: 1  Findings concerning for widespread disease recurrence  2   Scattered new small foci of radiotracer uptake in the bilateral cerebral hemispheres suspicious for intracranial metastasis  Recommend evaluation with dedicated MRI with and without IV contrast  3   Scattered new subcentimeter pulmonary nodules measuring up to 5 mm   These are not FDG avid but may be too small to characterize by PET  Recommend reassessment on follow-up  4   New mildly FDG avid retroperitoneal nodule just lateral to the right psoas, suspicious for retroperitoneal metastasis  5   Scattered new FDG avid lymph nodes along the proximal iliac chains suspicious for metastasis  6   Multiple new FDG avid right inguinal lymph nodes and subcutaneous nodules extending along the right anteromedial thigh suspicious for metastasis  7   Multiple new FDG avid osseous metastasis  The study was marked in EP[employer] for significant notification  Workstation performed: [id number]     Procedure: IR biopsy retroperitoneum    Result Date: 3/17/2023  Narrative: PROCEDURE: CT-guided retroperitoneal mass biopsy Procedural Personnel Attending physician(s): Dr Federico Nascimento Pre-procedure diagnosis: Melanoma Post-procedure diagnosis: Same Indication: Patient with metastatic melanoma found to have hypermetabolic right retroperitoneal soft tissue mass  PROCEDURE SUMMARY: - Percutaneous CT-guided right retroperitoneal soft tissue mass biopsy PROCEDURE DETAILS: Pre-procedure Reference imaging for biopsy target: PET/CT 3/2/2023 Consent: Informed consent for the procedure including risks, benefits and alternatives was obtained and time-out was performed prior to the procedure  Preparation: The site was prepared and draped using maximal sterile barrier technique including cutaneous antisepsis  Anesthesia/sedation Level of anesthesia/sedation: Moderate sedation (conscious sedation) Anesthesia/sedation administered by: IR nurse under attending supervision with continuous monitoring of the patient's level of consciousness and physiologic status Total intra-service sedation time (minutes): 30 Biopsy Local anesthesia was administered  Under CT guidance, 17-gauge coaxial introducer needle was advanced into the right retroperitoneal soft tissue mass    18-gauge core biopsy needle was used to obtain 4 core needle samples which were placed into formalin  Cytotechnologist was present to confirm adequacy of samples  Needle was removed and bandage applied  Postprocedure CT showed no hematoma  Radiation Dose CT dose length product (mGy-cm): 1270 58 Additional Details Specimens removed: 4 core needle samples placed into formalin  Estimated blood loss (mL): None Complications: No immediate complications  Impression: CT-guided biopsy of right retroperitoneal soft tissue mass  Plan: Specimens sent for evaluation  Workstation performed: [id number]             Assessment/Plan  Mr Juan Alberto Loyd is a 75 yr male with static melanoma undergoing whole brain radiation for brain metastases now and will restart BRAF targeted therapy after completion here for follow-up and surveillance  1  Metastatic melanoma ([facility] [address] )  2  Metastasis to brain [facility])  Widespread metastatic disease including brain metastases  Undergoing whole brain radiotherapy  Targeted therapy on hold until he completes this  He will restart on April 3, 2023  He will return in 2 weeks following his restarting treatment for monitoring evaluation and safety on treatment  He will get blood work and EKG at that time  Orders placed and prescription provided  He knows to call with any issues or concerns prior to his next visit  - ECG 12 lead; Future  - CBC and differential; Future  - Comprehensive metabolic panel; Future  - LD,Blood; Future    3  High risk medication use  4  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  To his hemolytic anemia I will hold off on using immunotherapy at this time  He is on treatment with BRAF MEK targeted therapies  He is on treatment with targeted therapy and we will continue to monitor for side effects and lab abnormalities  We will monitor labs with each visit to ensure safety of continuing on treatment  He knows to watch for signs and symptoms for side effects        He will get EKG and blood work 2 weeks after restarting treatment with encorafenib and binimetinib  - ECG 12 lead; Future  - CBC and differential; Future  - Comprehensive metabolic panel; Future  - LD,Blood; Future    Return in about 26 days (around 4/17/2023) for Office Visit, labs, EKG       Raul Astudillo MD, PhD

## 2023-03-22 NOTE — TELEPHONE ENCOUNTER
02/07/23 2:02 PM     VB [employer] SmartForm used to document caregap status      Gloria Wayne MA Vtama Pregnancy And Lactation Text: It is unknown if this medication can cause problems during pregnancy and breastfeeding.

## 2023-03-23 ENCOUNTER — TELEPHONE (OUTPATIENT)
Dept: HEMATOLOGY ONCOLOGY | Facility: CLINIC | Age: 68
End: 2023-03-23

## 2023-03-23 NOTE — TELEPHONE ENCOUNTER
Patient Call Form   Reason for patient call? Patient calling in stating that when he saw Dr Miranda Rojas yesterday, she said she was going to refill his dexamethasone  Patient states he has not received any notification that it was sent to the pharmacy  Patient's primary oncologist? Dr Miranda Rojas    Name of person the patient was calling for? Flavio Bravo    Does the patient issue require a call back? Yes   If call back required, inform patient that the message will be forwarded to the team and someone will get back to them as soon as possible    Did you relay this information to the patient?  Yes

## 2023-04-06 ENCOUNTER — TELEPHONE (OUTPATIENT)
Dept: HEMATOLOGY ONCOLOGY | Facility: CLINIC | Age: 68
End: 2023-04-06

## 2023-04-06 ENCOUNTER — APPOINTMENT (OUTPATIENT)
Dept: LAB | Facility: MEDICAL CENTER | Age: 68
End: 2023-04-06

## 2023-04-06 DIAGNOSIS — C43.9 METASTATIC MELANOMA (HCC): ICD-10-CM

## 2023-04-06 DIAGNOSIS — C79.31 METASTASIS TO BRAIN (HCC): ICD-10-CM

## 2023-04-06 RX ORDER — DEXAMETHASONE 4 MG/1
4 TABLET ORAL EVERY 6 HOURS
Qty: 28 TABLET | Refills: 0 | Status: SHIPPED | OUTPATIENT
Start: 2023-04-06

## 2023-04-06 NOTE — TELEPHONE ENCOUNTER
Patient Call    Who are you speaking with? Patient    If it is not the patient, are they listed on an active communication consent form? N/A   What is the reason for this call? Patient would like his lab results- hemoglobin  Does this require a call back? Yes   If a call back is required, please list best call back number [phone]   If a call back is required, advise that a message will be forwarded to their care team and someone will return their call as soon as possible  Did you relay this information to the patient?  yes

## 2023-04-06 NOTE — TELEPHONE ENCOUNTER
Returned patients phone call regarding lab results  All questions answered to the best of my ability  Patient states he stopped dexamethasone last week as he ran out of pills  I instructed patient to continue taking medication and sent a refill to his pharmacy  Patient complained of not feeling well, had fatigue, diarrhea and no appetite earlier this week, but reports feeling better today and symptoms have resolved

## 2023-04-11 ENCOUNTER — HOSPITAL ENCOUNTER (INPATIENT)
Facility: HOSPITAL | Age: 68
LOS: 2 days | Discharge: HOME | End: 2023-04-14
Attending: EMERGENCY MEDICINE | Admitting: INTERNAL MEDICINE

## 2023-04-11 ENCOUNTER — APPOINTMENT (EMERGENCY)
Dept: RADIOLOGY | Facility: HOSPITAL | Age: 68
End: 2023-04-11

## 2023-04-11 ENCOUNTER — APPOINTMENT (OUTPATIENT)
Dept: CT IMAGING | Facility: HOSPITAL | Age: 68
End: 2023-04-11

## 2023-04-11 DIAGNOSIS — K57.92 ACUTE DIVERTICULITIS: ICD-10-CM

## 2023-04-11 DIAGNOSIS — R53.83 FATIGUE: ICD-10-CM

## 2023-04-11 DIAGNOSIS — R79.89 ELEVATED SERUM CREATININE: ICD-10-CM

## 2023-04-11 DIAGNOSIS — R53.1 WEAKNESS: Primary | ICD-10-CM

## 2023-04-11 DIAGNOSIS — E86.0 DEHYDRATION: ICD-10-CM

## 2023-04-11 DIAGNOSIS — C43.9 MELANOMA (HCC): ICD-10-CM

## 2023-04-11 PROBLEM — R19.7 WATERY DIARRHEA: Status: ACTIVE | Noted: 2023-01-01

## 2023-04-11 LAB
2HR DELTA HS TROPONIN: 3 NG/L
4HR DELTA HS TROPONIN: 4 NG/L
ALBUMIN SERPL BCP-MCNC: 3.7 G/DL (ref 3.5–5)
ALP SERPL-CCNC: 74 U/L (ref 34–104)
ALT SERPL W P-5'-P-CCNC: 35 U/L (ref 7–52)
ANION GAP SERPL CALCULATED.3IONS-SCNC: 15 MMOL/L (ref 4–13)
ANISOCYTOSIS BLD QL SMEAR: PRESENT
AST SERPL W P-5'-P-CCNC: 43 U/L (ref 13–39)
BASOPHILS # BLD MANUAL: 0 THOUSAND/UL (ref 0–0.1)
BASOPHILS NFR MAR MANUAL: 0 % (ref 0–1)
BILIRUB SERPL-MCNC: 0.93 MG/DL (ref 0.2–1)
BUN SERPL-MCNC: 29 MG/DL (ref 5–25)
CALCIUM SERPL-MCNC: 9.1 MG/DL (ref 8.4–10.2)
CARDIAC TROPONIN I PNL SERPL HS: 11 NG/L
CARDIAC TROPONIN I PNL SERPL HS: 12 NG/L
CARDIAC TROPONIN I PNL SERPL HS: 8 NG/L
CHLORIDE SERPL-SCNC: 102 MMOL/L (ref 96–108)
CO2 SERPL-SCNC: 21 MMOL/L (ref 21–32)
CREAT SERPL-MCNC: 1.21 MG/DL (ref 0.6–1.3)
EOSINOPHIL # BLD MANUAL: 0.14 THOUSAND/UL (ref 0–0.4)
EOSINOPHIL NFR BLD MANUAL: 1 % (ref 0–6)
ERYTHROCYTE [DISTWIDTH] IN BLOOD BY AUTOMATED COUNT: 13.4 % (ref 11.6–15.1)
GFR SERPL CREATININE-BSD FRML MDRD: 60 ML/MIN/1.73SQ M
GLUCOSE SERPL-MCNC: 146 MG/DL (ref 65–140)
HCT VFR BLD AUTO: 40.9 % (ref 36.5–49.3)
HGB BLD-MCNC: 13.4 G/DL (ref 12–17)
LG PLATELETS BLD QL SMEAR: PRESENT
LYMPHOCYTES # BLD AUTO: 0 % (ref 14–44)
LYMPHOCYTES # BLD AUTO: 0 THOUSAND/UL (ref 0.6–4.47)
MCH RBC QN AUTO: 37.2 PG (ref 26.8–34.3)
MCHC RBC AUTO-ENTMCNC: 32.8 G/DL (ref 31.4–37.4)
MCV RBC AUTO: 114 FL (ref 82–98)
MONOCYTES # BLD AUTO: 0.14 THOUSAND/UL (ref 0–1.22)
MONOCYTES NFR BLD: 1 % (ref 4–12)
MYELOCYTES NFR BLD MANUAL: 1 % (ref 0–1)
NEUTROPHILS # BLD MANUAL: 13.63 THOUSAND/UL (ref 1.85–7.62)
NEUTS BAND NFR BLD MANUAL: 4 % (ref 0–8)
NEUTS SEG NFR BLD AUTO: 93 % (ref 43–75)
PLATELET # BLD AUTO: 246 THOUSANDS/UL (ref 149–390)
PLATELET BLD QL SMEAR: ADEQUATE
PMV BLD AUTO: 10.8 FL (ref 8.9–12.7)
POLYCHROMASIA BLD QL SMEAR: PRESENT
POTASSIUM SERPL-SCNC: 3.7 MMOL/L (ref 3.5–5.3)
PROT SERPL-MCNC: 6.3 G/DL (ref 6.4–8.4)
RBC # BLD AUTO: 3.6 MILLION/UL (ref 3.88–5.62)
RBC MORPH BLD: PRESENT
SODIUM SERPL-SCNC: 138 MMOL/L (ref 135–147)
WBC # BLD AUTO: 14.05 THOUSAND/UL (ref 4.31–10.16)

## 2023-04-11 RX ORDER — MEMANTINE HYDROCHLORIDE 5 MG/1
7.5 TABLET ORAL 2 TIMES DAILY
Status: DISCONTINUED | OUTPATIENT
Start: 2023-04-12 | End: 2023-04-12

## 2023-04-11 RX ORDER — PREDNISONE 20 MG/1
20 TABLET ORAL DAILY
Status: DISCONTINUED | OUTPATIENT
Start: 2023-04-12 | End: 2023-04-11

## 2023-04-11 RX ORDER — PANTOPRAZOLE SODIUM 40 MG/1
40 TABLET, DELAYED RELEASE ORAL
Status: DISCONTINUED | OUTPATIENT
Start: 2023-04-12 | End: 2023-04-14 | Stop reason: HOSPADM

## 2023-04-11 RX ORDER — ONDANSETRON 4 MG/1
4 TABLET, ORALLY DISINTEGRATING ORAL EVERY 8 HOURS PRN
Status: DISCONTINUED | OUTPATIENT
Start: 2023-04-11 | End: 2023-04-14 | Stop reason: HOSPADM

## 2023-04-11 RX ORDER — POTASSIUM CHLORIDE 20 MEQ/1
20 TABLET, EXTENDED RELEASE ORAL EVERY MORNING
Status: DISCONTINUED | OUTPATIENT
Start: 2023-04-12 | End: 2023-04-14 | Stop reason: HOSPADM

## 2023-04-11 RX ORDER — NICOTINE 21 MG/24HR
1 PATCH, TRANSDERMAL 24 HOURS TRANSDERMAL DAILY
Status: DISCONTINUED | OUTPATIENT
Start: 2023-04-12 | End: 2023-04-11

## 2023-04-11 RX ORDER — ACETAMINOPHEN 325 MG/1
650 TABLET ORAL EVERY 6 HOURS PRN
Status: DISCONTINUED | OUTPATIENT
Start: 2023-04-11 | End: 2023-04-14 | Stop reason: HOSPADM

## 2023-04-11 RX ORDER — SODIUM CHLORIDE, SODIUM GLUCONATE, SODIUM ACETATE, POTASSIUM CHLORIDE, MAGNESIUM CHLORIDE, SODIUM PHOSPHATE, DIBASIC, AND POTASSIUM PHOSPHATE .53; .5; .37; .037; .03; .012; .00082 G/100ML; G/100ML; G/100ML; G/100ML; G/100ML; G/100ML; G/100ML
1000 INJECTION, SOLUTION INTRAVENOUS ONCE
Status: COMPLETED | OUTPATIENT
Start: 2023-04-11 | End: 2023-04-11

## 2023-04-11 RX ORDER — SODIUM CHLORIDE, SODIUM LACTATE, POTASSIUM CHLORIDE, CALCIUM CHLORIDE 600; 310; 30; 20 MG/100ML; MG/100ML; MG/100ML; MG/100ML
100 INJECTION, SOLUTION INTRAVENOUS CONTINUOUS
Status: DISCONTINUED | OUTPATIENT
Start: 2023-04-11 | End: 2023-04-13

## 2023-04-11 RX ORDER — BENZONATATE 100 MG/1
100 CAPSULE ORAL 3 TIMES DAILY
Status: DISCONTINUED | OUTPATIENT
Start: 2023-04-11 | End: 2023-04-14 | Stop reason: HOSPADM

## 2023-04-11 RX ORDER — SULFAMETHOXAZOLE AND TRIMETHOPRIM 800; 160 MG/1; MG/1
1 TABLET ORAL 3 TIMES WEEKLY
Status: DISCONTINUED | OUTPATIENT
Start: 2023-04-12 | End: 2023-04-11

## 2023-04-11 RX ORDER — ALPRAZOLAM 0.5 MG/1
0.5 TABLET ORAL 2 TIMES DAILY
Status: DISCONTINUED | OUTPATIENT
Start: 2023-04-11 | End: 2023-04-14 | Stop reason: HOSPADM

## 2023-04-11 RX ORDER — DABIGATRAN ETEXILATE 150 MG/1
150 CAPSULE ORAL EVERY 12 HOURS SCHEDULED
Status: DISCONTINUED | OUTPATIENT
Start: 2023-04-11 | End: 2023-04-14 | Stop reason: HOSPADM

## 2023-04-11 RX ORDER — DEXAMETHASONE 4 MG/1
4 TABLET ORAL EVERY 6 HOURS
Status: DISCONTINUED | OUTPATIENT
Start: 2023-04-11 | End: 2023-04-12

## 2023-04-11 RX ADMIN — SODIUM CHLORIDE, SODIUM GLUCONATE, SODIUM ACETATE, POTASSIUM CHLORIDE AND MAGNESIUM CHLORIDE 1000 ML: 526; 502; 368; 37; 30 INJECTION, SOLUTION INTRAVENOUS at 16:17

## 2023-04-11 RX ADMIN — DABIGATRAN ETEXILATE MESYLATE 150 MG: 150 CAPSULE ORAL at 20:30

## 2023-04-11 RX ADMIN — SODIUM CHLORIDE, SODIUM LACTATE, POTASSIUM CHLORIDE, AND CALCIUM CHLORIDE 100 ML/HR: .6; .31; .03; .02 INJECTION, SOLUTION INTRAVENOUS at 20:07

## 2023-04-11 RX ADMIN — DEXAMETHASONE 4 MG: 4 TABLET ORAL at 20:31

## 2023-04-11 RX ADMIN — BENZONATATE 100 MG: 100 CAPSULE ORAL at 20:31

## 2023-04-11 RX ADMIN — ALPRAZOLAM 0.5 MG: 0.5 TABLET ORAL at 20:30

## 2023-04-11 NOTE — ED ATTENDING ATTESTATION
4/11/2023  IMichael DO, saw and evaluated the patient  I have discussed the patient with the resident/non-physician practitioner and agree with the resident's/non-physician practitioner's findings, Plan of Care, and MDM as documented in the resident's/non-physician practitioner's note, except where noted  All available labs and Radiology studies were reviewed  I was present for key portions of any procedure(s) performed by the resident/non-physician practitioner and I was immediately available to provide assistance  At this point I agree with the current assessment done in the Emergency Department  I have conducted an independent evaluation of this patient a history and physical is as follows:    Patient is a 58-year-old gentleman with history of metastatic melanoma with diffuse mets to the brain  Recently underwent 2 weeks of radiation and had stopped his oral chemo  Patient with increasing weakness and persistent diarrhea  On exam patient does appear dry with dry mucous membranes  He is maintaining airway and secretions  EOMI  PERRLA  Tachycardic without any murmurs  No respiratory distress  Patient can spontaneously move bilateral upper extremities and lower extremities independently of each other  No lower extremity edema  Patient does appear older than stated age  Cranial nerves II through XII grossly intact  At bedside with resident and discussed with patient and family regarding need for admission for persistent and continued IV fluids given dehydration as well as rule out C  difficile  Will need possible palliative versus hospice at care as well as PT OT and case management to help with assistance at home  Family are agreeable and answered questions at bedside      Patient's labs no significant change compared to old  Disclosure: Voice to text software was used in the preparation of this document and could have resulted in translational errors        Occasional wrong "word or \"sound a like\" substitutions may have occurred due to the inherent limitations of voice recognition software  Read the chart carefully and recognize, using context, where substitutions have occurred  I have independently reviewed external records are available to me to the level of detail possible within the time constraints of my patient care responsibilities in the ED          ED Course         Critical Care Time  Procedures      "

## 2023-04-11 NOTE — PROGRESS NOTES
[facility]  Progress Note  Name: Arleen Archer  MRN: [id number]  Unit/Bed#: [facility] I Date of Admission: 4/11/2023   Date of Service: 4/11/2023 I Hospital Day: 0    Assessment/Plan   * Generalized weakness  Assessment & Plan  28-year-old male patient with past medical history of malignant melanoma, metastasis to brain, currently undergoing brain radiation  Presented with complaining of diarrhea, generalized weakness, noted dehydrated on initial evaluation in ED  Noted with chronic leukocytosis  Noted with elevated BUN, anion gap, creatinine within normal limits  Patient reports poor p o  intake lately  C  difficile pending  Stool enteric panel pending  Currently ill-appearing, acutely nontoxic-appearing  Continue with IV hydration  Continue with supportive care  Continue to monitor off antibiotics  Fall precaution, PT OT eval   Follow-up with palliative care consult  Watery diarrhea  Assessment & Plan  Complaining of having watery diarrhea that has been ongoing for 2 weeks  Nonbloody  Abdomen is distended on exam, nontender  C  difficile pending  Enteric panel pending  Follow-up with CT abdomen pelvis report  Continue with supportive care  Leukocytosis  Assessment & Plan  Noted with chronic leukocytosis  Patient is currently on steroids  Likely multifactorial in settings of being on steroids and malignant melanoma  No signs of active overt infection noted  Continue monitor off antibiotics  Personal history of malignant melanoma  Assessment & Plan  Present on admission history of malignant melanoma with brain metastasis  Completed brain radiation 2 weeks ago  Currently on p o  chemotherapy  Overall guarded prognosis  Follow-up with palliative care consult  Continue outpatient follow-up with primary heme-onc  Essential hypertension  Assessment & Plan  Present on admission history of hypertension  Blood pressure currently stable    Hold Lasix, hydrochlorothiazide, Toprol, prazosin for now since soft blood pressure  Continue with IV fluids  Portal vein thrombosis  Assessment & Plan  Present admission history of pulmonary embolism, portal vein thrombosis currently on Xarelto  VTE Pharmacologic Prophylaxis:   Moderate Risk (Score 3-4) - Pharmacological DVT Prophylaxis Ordered: rivaroxaban (Xarelto)  Code Status: Level 1 - Full Code     Anticipated Length of Stay: Patient will be admitted on an observation basis with an anticipated length of stay of less than 2 midnights secondary to Generalized weakness, diarrhea  Total Time Spent on Date of Encounter in care of patient: 85 minutes This time was spent on one or more of the following: performing physical exam; counseling and coordination of care; obtaining or reviewing history; documenting in the medical record; reviewing/ordering tests, medications or procedures; communicating with other healthcare professionals and discussing with patient's family/caregivers  Chief Complaint: Generalized weakness, diarrhea    History of Present Illness:    Uday Winter is a 70 y o  male with a PMH of malignant melanoma, mets to brain, completed radiation therapy, currently on chemotherapy, history of hemolytic anemia was treated with rituximab, history of pulmonary embolism, portal vein thrombosis currently on Xarelto, hypertension, who presents with complaining of generalized weakness associated with diarrhea, poor appetite for last 2 weeks  Patient reports that he completed  Radiation 2 weeks ago  Symptoms started roughly around 2 weeks ago after completing radiation therapy and thinks symptoms are related to radiation  Denies fever, chills, nausea, vomiting, chest pain, dyspnea, dysuria, hematuria, melena, medic easier, any other new complaints  No other events reported  Denies smoking, any other substances      Review of Systems:  Review of Systems   Constitutional: Positive for fatigue  Negative for chills and fever  HENT: Negative for congestion  Eyes: Negative for visual disturbance  Respiratory: Negative for cough and shortness of breath  Cardiovascular: Negative for chest pain and palpitations  Gastrointestinal: Positive for diarrhea  Negative for abdominal pain, anal bleeding, blood in stool, nausea, rectal pain and vomiting  Endocrine: Negative for polyuria  Musculoskeletal: Negative for neck stiffness  Neurological: Positive for weakness  Psychiatric/Behavioral: Negative for agitation         Past Medical and Surgical History:   Past Medical History:   Diagnosis Date   • Anemia 11/02/2016   • Anxiety    • Arthritis    • Autoimmune hemolytic anemia ([facility] [address] )    • Claustrophobia    • COVID 12/2020   • DVT (deep venous thrombosis) (HCC)    • GERD (gastroesophageal reflux disease)    • Hearing loss, right    • Hemolytic anemia (HCC)    • History of transfusion     2018 - no adverse reaction   • Hypertension    • Malignant melanoma of leg, right ([facility] [address] ) 07/01/2022   • Palpitation    • Portal vein thrombosis    • PTSD (post-traumatic stress disorder)    • Pulmonary emboli (HCC)    • Squamous cell skin cancer 12/20/2022    SCCIS- 12/6/22   • Tobacco abuse        Past Surgical History:   Procedure Laterality Date   • CATARACT EXTRACTION Bilateral    • CHOLECYSTECTOMY  07/18/2017   • COLONOSCOPY     • ELBOW ARTHROPLASTY Left     bursectomy   • IR BIOPSY RETROPERITONEUM  3/17/2023   • IR DRAINAGE TUBE CHECK WITH SCLEROSIS  10/06/2022   • IR DRAINAGE TUBE CHECK WITH SCLEROSIS  10/10/2022   • IR DRAINAGE TUBE CHECK WITH SCLEROSIS  10/20/2022   • IR DRAINAGE TUBE CHECK WITH SCLEROSIS  10/27/2022   • IR DRAINAGE TUBE CHECK WITH SCLEROSIS  11/04/2022   • IR DRAINAGE TUBE CHECK WITH SCLEROSIS  11/15/2022   • IR DRAINAGE TUBE CHECK WITH SCLEROSIS  11/25/2022   • IR DRAINAGE TUBE PLACEMENT  09/19/2022   • JOINT REPLACEMENT Right 02/02/2021    knee   • KNEE SURGERY Right     meniscus tear • LYMPH NODE BIOPSY Right 07/29/2022    Procedure: BIOPSY LYMPH NODE SENTINEL;  Surgeon: Marika Hoffman MD;  [facility]: [facility];  Service: Surgical Oncology   • MOHS SURGERY Right 12/20/2022    Right dorsal hand SCCIS-Dr Isabel   • OK ARTHRP KNE CONDYLE&PLATU MEDIAL&LAT COMPARTMENTS Right 02/02/2021    Procedure: ARTHROPLASTY KNEE TOTAL;  Surgeon: Gladys Lazo MD;  Location: [facility];  Service: Orthopedics   • OK ARTHRP KNE CONDYLE&PLATU MEDIAL&LAT COMPARTMENTS Left 11/17/2021    Procedure: TOTAL KNEE REPLACEMENT;  Surgeon: Gladys Lazo MD;  [facility];  Service: Orthopedics   • OK LAPS SURG CHOLECYSTECTOMY Omari Monica N/A 12/23/2017    Procedure: CHOLECYSTECTOMY LAPAROSCOPIC with cholangiogram;  Surgeon: Shaylee Meek MD;  [facility];  Service: General   • OK SPLENECTOMY TOTAL SEPARATE PROCEDURE N/A 05/18/2017    Procedure: LAPAROSCOPIC HAND ASSIST SPLENECTOMY;  Surgeon: Alen Valdovinos MD;  [facility];  Service: Surgical Oncology   • SHOULDER SURGERY Left     rotator cuff x4, reconstruction   • SKIN BIOPSY  5 May 2022   • SKIN CANCER EXCISION  29 July 2022   • SKIN LESION EXCISION Right 07/29/2022    Procedure: WIDE EXCISION RIGHT MEDIAL THIGH;  Surgeon: Marika Hoffman MD;  [facility]: [facility];  Service: Surgical Oncology       Meds/Allergies:  Prior to Admission medications    Medication Sig Start Date End Date Taking?  Authorizing Provider   acetaminophen (TYLENOL) 325 mg tablet Take 2 tablets (650 mg total) by mouth every 6 (six) hours as needed for mild pain 2/3/21  Yes Esa Cancer, PA-C   ALPRAZolam Rolene Rebel) 0 5 mg tablet Take 1 tablet (0 5 mg total) by mouth 2 (two) times a day Take one tablet one hour before scan and bring the second one with you to take right before the MRI if needed 3/3/23  Yes Bhupendra Garcia MD   dabigatran etexilate (PRADAXA) 150 mg capsu Take 1 capsule (150 mg total) by mouth every 12 (twelve) hours 8/4/22  Yes Vivek Romano MD dexamethasone (DECADRON) 4 mg tablet Take 1 tablet (4 mg total) by mouth every 6 (six) hours Please make sure you're on Protonix 40 mg daily for GI prophylaxis 4/6/23  Yes Rupa Smith MD   encorafenib (BRAFTOVI) 75 MG capsule TAKE SIX CAPSULES BY MOUTH DAILY (APPROVED) 11/8/22 11/3/23 Yes Historical Provider, MD   furosemide (LASIX) 20 mg tablet Take 1 tablet (20 mg total) by mouth daily 8/24/22  Yes VISHNU Soni   hydrochlorothiazide (HYDRODIURIL) 25 mg tablet Take 1 tablet (25 mg total) by mouth daily  Patient taking differently: Take 25 mg by mouth every morning 2/15/21  Yes Melburn Ger, DO   metoprolol succinate (TOPROL-XL) 25 mg 24 hr tablet Take 0 5 tablets (12 5 mg total) by mouth daily  Patient taking differently: Take 12 5 mg by mouth every morning 1/4/21  Yes Melburn Ger, DO   ondansetron [facility]) 4 mg tablet Take 1 tablet (4 mg total) by mouth every 8 (eight) hours as needed for nausea or vomiting 11/17/22  Yes Geoff Multani MD   pantoprazole (PROTONIX) 40 mg tablet Take 1 tablet (40 mg total) by mouth daily  Patient taking differently: Take 40 mg by mouth every morning 8/27/20  Yes Melburn Ger, DO   pantoprazole (PROTONIX) 40 mg tablet Take 1 tablet (40 mg total) by mouth daily 3/4/23  Yes August Comes, DO   potassium chloride (K-DUR,KLOR-CON) 20 mEq tablet Take 1 tablet (20 mEq total) by mouth daily  Patient taking differently: Take 20 mEq by mouth every morning 12/29/20  Yes Melburn Ger, DO   prazosin (MINIPRESS) 1 mg capsule Take 1 mg by mouth daily at bedtime  12/8/20  Yes Historical Provider, MD   predniSONE 20 mg tablet Take 1 tablet (20 mg total) by mouth daily 2/17/23  Yes Wayne Cabrera,    sulfamethoxazole-trimethoprim (BACTRIM DS) 800-160 mg per tablet Take 1 tablet by mouth 3 (three) times a week Monday, Wednesday and Friday 8/5/22 4/30/25 Yes Hipolito Juares MD   VITAMIN D PO Take 1,000 Units by mouth daily    Yes Historical Provider, MD   ascorbic acid (VITAMIN C) 1000 MG tablet Take 1 tablet (1,000 mg total) by mouth every 12 (twelve) hours for 6 doses  Patient taking differently: Take 1,000 mg by mouth daily Every other day 12/22/20 12/6/22  Akshat Kidd MD   benzonatate (TESSALON PERLES) 100 mg capsule Take 1 capsule (100 mg total) by mouth 3 (three) times a day 8/5/22   Vivek Romano MD   binimetinib (MEKTOVI) 15 MG tablet TAKE THREE TABLETS BY MOUTH EVERY 12 HOURS (APPROVED) 11/8/22 11/3/23  Historical Provider, MD   memantine (NAMENDA TITRATION PACK) Follow package directions and titration schedule reviewed at consultation  Call Radiation Oncology with questions  3/7/23   Jenny Calle MD   sodium chloride, PF, 0 9 % 10 mL by Intracatheter route daily Intracatheter flushing daily  May substitute prefilled syringe with normal saline 10 mL vials, 10 mL syringes, and 18 g blunt needles 9/19/22 12/18/22  Jostin Santos MD     I have reviewed home medications with patient personally      Allergies: No Known Allergies    Social History:  Marital Status: /Civil Union     Substance Use History:   Social History     Substance and Sexual Activity   Alcohol Use Yes   • Alcohol/week: 6 0 standard drinks   • Types: 6 Cans of beer per week    Comment: socially     Social History     Tobacco Use   Smoking Status Some Days   • Packs/day: 0 00   • Years: 5 00   • Pack years: 0 00   • Types: Cigars, Cigarettes   Smokeless Tobacco Current   • Types: Snuff   Tobacco Comments    5  a week average     Social History     Substance and Sexual Activity   Drug Use Yes   • Types: Marijuana    Comment: medical marijuana       Family History:  Family History   Problem Relation Age of Onset   • Cancer Mother    • Breast cancer Mother    • Other Father         CABG   • Heart attack Paternal Grandfather         acute MI       Physical Exam:     Vitals:   Blood Pressure: 146/83 (04/11/23 1724)  Pulse: 96 (04/11/23 1724)  Temperature: 98 2 °F (36 8 °C) (04/11/23 1528)  Temp Source: Oral (04/11/23 1528)  Respirations: 20 (04/11/23 1724)  Weight - Scale: 88 5 kg (195 lb) (04/11/23 1528)  SpO2: 97 % (04/11/23 1724)    Physical Exam  Constitutional:       General: He is not in acute distress  Appearance: Normal appearance  He is obese  He is not ill-appearing  HENT:      Head: Normocephalic and atraumatic  Eyes:      Pupils: Pupils are equal, round, and reactive to light  Cardiovascular:      Rate and Rhythm: Normal rate  Pulses: Normal pulses  Pulmonary:      Effort: Pulmonary effort is normal  No respiratory distress  Breath sounds: Normal breath sounds  No wheezing  Abdominal:      General: There is distension  Palpations: Abdomen is soft  Tenderness: There is no abdominal tenderness  There is no guarding  Musculoskeletal:      Cervical back: No rigidity  Right lower leg: No edema  Left lower leg: No edema  Neurological:      Mental Status: He is alert and oriented to person, place, and time  Mental status is at baseline     Psychiatric:         Mood and Affect: Mood normal          Behavior: Behavior normal        Additional Data:     Lab Results:  Results from last 7 days   Lab Units 04/11/23  1616   WBC Thousand/uL 14 05*   HEMOGLOBIN g/dL 13 4   HEMATOCRIT % 40 9   PLATELETS Thousands/uL 246   BANDS PCT % 4   LYMPHO PCT % 0*   MONO PCT % 1*   EOS PCT % 1     Results from last 7 days   Lab Units 04/11/23  1616   SODIUM mmol/L 138   POTASSIUM mmol/L 3 7   CHLORIDE mmol/L 102   CO2 mmol/L 21   BUN mg/dL 29*   CREATININE mg/dL 1 21   ANION GAP mmol/L 15*   CALCIUM mg/dL 9 1   ALBUMIN g/dL 3 7   TOTAL BILIRUBIN mg/dL 0 93   ALK PHOS U/L 74   ALT U/L 35   AST U/L 43*   GLUCOSE RANDOM mg/dL 146*                       Lines/Drains:  Invasive Devices     Peripheral Intravenous Line  Duration           Peripheral IV 04/11/23 Left Antecubital <1 day                    Imaging:   XR chest 1 view portable   ED Interpretation by Natividad Schafer DO (04/11 1621)   No acute cardiopulmonary process noted  CT abdomen pelvis wo contrast    (Results Pending)       EKG and Other Studies Reviewed on Admission:   · EKG: Normal sinus rhythm  ** Please Note: This note has been constructed using a voice recognition system   **

## 2023-04-11 NOTE — ASSESSMENT & PLAN NOTE
Present on admission history of malignant melanoma with brain metastasis  Currently undergoing whole brain radiation  Currently on p o  chemotherapy- hold in the setting of acute infection  Overall guarded prognosis  Palliative care consult placed -can followup in outpt setting  Continue outpatient follow-up with primary heme-onc    Messaged on call oncologist to make aware of current hospitalization and plan

## 2023-04-11 NOTE — ASSESSMENT & PLAN NOTE
Present on admission history of hypertension    Had some lower blood pressures on admission  Therefore held Lasix, hydrochlorothiazide, Toprol, prazosin  Blood pressures with rise to the 140s with IV fluids  We will resume Toprol and prazosin at this time with hold parameters in place

## 2023-04-11 NOTE — ED PROVIDER NOTES
History  Chief Complaint   Patient presents with   • Weakness - Generalized     Pt ended radiation treatment for brain Ca, reports diarrhea for 2 weeks with general weakness  Patient is a 71-year-old male with past medical history significant for metastatic melanoma with mets diffuse as well as the brain  He recently underwent 2 weeks of brain radiation which she just ended  Patient states that since he has had diffuse generalized body aches, diarrhea for 2 weeks as well as weakness  He denies any fevers or chills chest pain or shortness of breath he denies any nausea vomiting constipation, dysuria, hematuria  He denies any cough, nasal congestion  Patient has been taking his chemotherapy agents as prescribed  His stool is nonbloody in nature just been watery  Patient also reports that his appetite has been poor has not had much to eat or drink  He also notes a headache that has been constant since radiation which they said was a normal side effect  He denies any numbness or tingling in any extremity any ringing in his ears or any vision change  Patient also with a history of autoimmune hemolytic anemia  Prior to Admission Medications   Prescriptions Last Dose Informant Patient Reported? Taking?    ALPRAZolam (XANAX) 0 5 mg tablet   No No   Sig: Take 1 tablet (0 5 mg total) by mouth 2 (two) times a day Take one tablet one hour before scan and bring the second one with you to take right before the MRI if needed   VITAMIN D PO   Yes No   Sig: Take 1,000 Units by mouth daily    acetaminophen (TYLENOL) 325 mg tablet   No No   Sig: Take 2 tablets (650 mg total) by mouth every 6 (six) hours as needed for mild pain   ascorbic acid (VITAMIN C) 1000 MG tablet   No No   Sig: Take 1 tablet (1,000 mg total) by mouth every 12 (twelve) hours for 6 doses   Patient taking differently: Take 1,000 mg by mouth daily Every other day   benzonatate (TESSALON PERLES) 100 mg capsule   No No   Sig: Take 1 capsule (100 mg total) by mouth 3 (three) times a day   binimetinib (MEKTOVI) 15 MG tablet   Yes No   Sig: TAKE THREE TABLETS BY MOUTH EVERY 12 HOURS (APPROVED)   dabigatran etexilate (PRADAXA) 150 mg capsu   No No   Sig: Take 1 capsule (150 mg total) by mouth every 12 (twelve) hours   dexamethasone (DECADRON) 4 mg tablet   No No   Sig: Take 1 tablet (4 mg total) by mouth every 6 (six) hours Please make sure you're on Protonix 40 mg daily for GI prophylaxis   encorafenib (BRAFTOVI) 75 MG capsule   Yes No   Sig: TAKE SIX CAPSULES BY MOUTH DAILY (APPROVED)   furosemide (LASIX) 20 mg tablet   No No   Sig: Take 1 tablet (20 mg total) by mouth daily   hydrochlorothiazide (HYDRODIURIL) 25 mg tablet   No No   Sig: Take 1 tablet (25 mg total) by mouth daily   Patient taking differently: Take 25 mg by mouth every morning   memantine (NAMENDA TITRATION PACK)   No No   Sig: Follow package directions and titration schedule reviewed at consultation  Call Radiation Oncology with questions     metoprolol succinate (TOPROL-XL) 25 mg 24 hr tablet   No No   Sig: Take 0 5 tablets (12 5 mg total) by mouth daily   Patient taking differently: Take 12 5 mg by mouth every morning   ondansetron (ZOFRAN) 4 mg tablet   No No   Sig: Take 1 tablet (4 mg total) by mouth every 8 (eight) hours as needed for nausea or vomiting   Patient not taking: Reported on 3/7/2023   pantoprazole (PROTONIX) 40 mg tablet   No No   Sig: Take 1 tablet (40 mg total) by mouth daily   Patient taking differently: Take 40 mg by mouth every morning   pantoprazole (PROTONIX) 40 mg tablet   No No   Sig: Take 1 tablet (40 mg total) by mouth daily   potassium chloride (K-DUR,KLOR-CON) 20 mEq tablet   No No   Sig: Take 1 tablet (20 mEq total) by mouth daily   Patient taking differently: Take 20 mEq by mouth every morning   prazosin (MINIPRESS) 1 mg capsule   Yes No   Sig: Take 1 mg by mouth daily at bedtime    predniSONE 20 mg tablet   No No   Sig: Take 1 tablet (20 mg total) by mouth daily   sodium chloride, PF, 0 9 %   No No   Sig: 10 mL by Intracatheter route daily Intracatheter flushing daily   May substitute prefilled syringe with normal saline 10 mL vials, 10 mL syringes, and 18 g blunt needles   sulfamethoxazole-trimethoprim (BACTRIM DS) 800-160 mg per tablet   No No   Sig: Take 1 tablet by mouth 3 (three) times a week Monday, Wednesday and Friday      Facility-Administered Medications: None       Past Medical History:   Diagnosis Date   • Anemia 11/02/2016   • Anxiety    • Arthritis    • Autoimmune hemolytic anemia ([facility] [place] [address] )    • Claustrophobia    • COVID 12/2020   • DVT (deep venous thrombosis) (HCC)    • GERD (gastroesophageal reflux disease)    • Hearing loss, right    • Hemolytic anemia (HCC)    • History of transfusion     2018 - no adverse reaction   • Hypertension    • Malignant melanoma of leg, right ([facility] [place] [address] ) 07/01/2022   • Palpitation    • Portal vein thrombosis    • PTSD (post-traumatic stress disorder)    • Pulmonary emboli (HCC)    • Squamous cell skin cancer 12/20/2022    SCCIS- 12/6/22   • Tobacco abuse        Past Surgical History:   Procedure Laterality Date   • CATARACT EXTRACTION Bilateral    • CHOLECYSTECTOMY  07/18/2017   • COLONOSCOPY     • ELBOW ARTHROPLASTY Left     bursectomy   • IR BIOPSY RETROPERITONEUM  3/17/2023   • IR DRAINAGE TUBE CHECK WITH SCLEROSIS  10/06/2022   • IR DRAINAGE TUBE CHECK WITH SCLEROSIS  10/10/2022   • IR DRAINAGE TUBE CHECK WITH SCLEROSIS  10/20/2022   • IR DRAINAGE TUBE CHECK WITH SCLEROSIS  10/27/2022   • IR DRAINAGE TUBE CHECK WITH SCLEROSIS  11/04/2022   • IR DRAINAGE TUBE CHECK WITH SCLEROSIS  11/15/2022   • IR DRAINAGE TUBE CHECK WITH SCLEROSIS  11/25/2022   • IR DRAINAGE TUBE PLACEMENT  09/19/2022   • JOINT REPLACEMENT Right 02/02/2021    knee   • KNEE SURGERY Right     meniscus tear   • LYMPH NODE BIOPSY Right 07/29/2022    Procedure: BIOPSY LYMPH NODE SENTINEL;  Surgeon: Mindy Castro MD;  [facility];  Service: Surgical Oncology   • MOHS SURGERY Right 12/20/2022    Right dorsal hand SCCIS-Dr Isabel   • NE ARTHRP KNE CONDYLE&PLATU MEDIAL&LAT COMPARTMENTS Right 02/02/2021    Procedure: ARTHROPLASTY KNEE TOTAL;  Surgeon: Elaine Gupta MD;  [facility];  Service: Orthopedics   • NE ARTHRP KNE CONDYLE&PLATU MEDIAL&LAT COMPARTMENTS Left 11/17/2021    Procedure: TOTAL KNEE REPLACEMENT;  Surgeon: Elaine Gupta MD;  [facility];  Service: Orthopedics   • NE LAPS SURG CHOLECYSTECTOMY Irina Bloodgood N/A 12/23/2017    Procedure: CHOLECYSTECTOMY LAPAROSCOPIC with cholangiogram;  Surgeon: Niraj Munoz MD;  [facility];  Service: General   • NE SPLENECTOMY TOTAL SEPARATE PROCEDURE N/A 05/18/2017    Procedure: LAPAROSCOPIC HAND ASSIST SPLENECTOMY;  Surgeon: Elgin Samaniego MD;  [facility];  Service: Surgical Oncology   • SHOULDER SURGERY Left     rotator cuff x4, reconstruction   • SKIN BIOPSY  5 May 2022   • SKIN CANCER EXCISION  29 July 2022   • SKIN LESION EXCISION Right 07/29/2022    Procedure: WIDE EXCISION RIGHT MEDIAL THIGH;  Surgeon: Malgorzata Lima MD;  [facility];  Service: Surgical Oncology       Family History   Problem Relation Age of Onset   • Cancer Mother    • Breast cancer Mother    • Other Father         CABG   • Heart attack Paternal Grandfather         acute MI     I have reviewed and agree with the history as documented  E-Cigarette/Vaping   • E-Cigarette Use Never User      E-Cigarette/Vaping Substances   • Nicotine No    • THC No    • CBD No    • Flavoring No    • Other No    • Unknown No      Social History     Tobacco Use   • Smoking status: Some Days     Packs/day: 0 00     Years: 5 00     Pack years: 0 00     Types: Cigars, Cigarettes   • Smokeless tobacco: Current     Types: Snuff   • Tobacco comments:     5  a week average   Vaping Use   • Vaping Use: Never used   Substance Use Topics   • Alcohol use:  Yes     Alcohol/week: 6 0 standard drinks     Types: 6 Cans of beer per week     Comment: socially   • Drug use: Yes     Types: Marijuana     Comment: medical marijuana        Review of Systems   Constitutional: Positive for activity change, appetite change and fatigue  Negative for chills and fever  HENT: Negative for congestion, ear pain and sore throat  Eyes: Negative for pain and visual disturbance  Respiratory: Negative for cough, chest tightness, shortness of breath and wheezing  Cardiovascular: Negative for chest pain and palpitations  Gastrointestinal: Negative for abdominal pain, constipation, diarrhea and vomiting  Genitourinary: Negative for dysuria and hematuria  Musculoskeletal: Negative for arthralgias, back pain, neck pain and neck stiffness  Skin: Negative for color change and rash  Neurological: Positive for headaches  Negative for dizziness, seizures, syncope, facial asymmetry, weakness, light-headedness and numbness  Psychiatric/Behavioral: Negative for agitation and behavioral problems  All other systems reviewed and are negative  Physical Exam  ED Triage Vitals   Temperature Pulse Respirations Blood Pressure SpO2   04/11/23 1528 04/11/23 1528 04/11/23 1528 04/11/23 1528 04/11/23 1528   98 2 °F (36 8 °C) (!) 186 20 (!) 119/103 97 %      Temp Source Heart Rate Source Patient Position - Orthostatic VS BP Location FiO2 (%)   04/11/23 1528 04/11/23 1600 04/11/23 1724 04/11/23 1724 --   Oral Monitor Lying Right arm       Pain Score       04/11/23 1528       7             Orthostatic Vital Signs  Vitals:    04/11/23 1528 04/11/23 1600 04/11/23 1724   BP: (!) 119/103  146/83   Pulse: (!) 186 (!) 110 96   Patient Position - Orthostatic VS:   Lying       Physical Exam  Vitals and nursing note reviewed  Constitutional:       General: He is not in acute distress  Appearance: He is well-developed  He is ill-appearing  HENT:      Head: Normocephalic and atraumatic        Right Ear: External ear normal       Left Ear: External ear normal       Nose: Nose normal       Mouth/Throat:      Mouth: Mucous membranes are dry  Eyes:      Extraocular Movements: Extraocular movements intact  Conjunctiva/sclera: Conjunctivae normal    Cardiovascular:      Rate and Rhythm: Normal rate and regular rhythm  Heart sounds: Normal heart sounds  No murmur heard  Pulmonary:      Effort: Pulmonary effort is normal  No respiratory distress  Breath sounds: Normal breath sounds  Abdominal:      General: Abdomen is flat  Palpations: Abdomen is soft  Tenderness: There is no abdominal tenderness  Musculoskeletal:         General: No swelling  Cervical back: Normal range of motion and neck supple  Right lower leg: No edema  Left lower leg: No edema  Skin:     General: Skin is warm and dry  Capillary Refill: Capillary refill takes more than 3 seconds  Neurological:      General: No focal deficit present  Mental Status: He is alert and oriented to person, place, and time     Psychiatric:         Mood and Affect: Mood normal          Behavior: Behavior normal          ED Medications  Medications   multi-electrolyte (ISOLYTE-S PH 7 4) bolus 1,000 mL (1,000 mL Intravenous New Bag 4/11/23 1617)       Diagnostic Studies  Results Reviewed     Procedure Component Value Units Date/Time    Clostridium difficile toxin by PCR with EIA [[id number]]     Lab Status: No result Specimen: Stool from Per Rectum     Comprehensive metabolic panel [[id number]]  (Abnormal) Collected: 04/11/23 1616    Lab Status: Final result Specimen: Blood from Arm, Right Updated: 04/11/23 1652     Sodium 138 mmol/L      Potassium 3 7 mmol/L      Chloride 102 mmol/L      CO2 21 mmol/L      ANION GAP 15 mmol/L      BUN 29 mg/dL      Creatinine 1 21 mg/dL      Glucose 146 mg/dL      Calcium 9 1 mg/dL      AST 43 U/L      ALT 35 U/L      Alkaline Phosphatase 74 U/L      Total Protein 6 3 g/dL      Albumin 3 7 g/dL      Total Bilirubin 0 93 mg/dL      eGFR 61 ml/min/1 73sq m     Narrative:      National Kidney Disease Foundation guidelines for Chronic Kidney Disease (CKD):   •  Stage 1 with normal or high GFR (GFR > 90 mL/min/1 73 square meters)  •  Stage 2 Mild CKD (GFR = 60-89 mL/min/1 73 square meters)  •  Stage 3A Moderate CKD (GFR = 45-59 mL/min/1 73 square meters)  •  Stage 3B Moderate CKD (GFR = 30-44 mL/min/1 73 square meters)  •  Stage 4 Severe CKD (GFR = 15-29 mL/min/1 73 square meters)  •  Stage 5 End Stage CKD (GFR <15 mL/min/1 73 square meters)  Note: GFR calculation is accurate only with a steady state creatinine    HS Troponin 0hr (reflex protocol) [[id number]]  (Normal) Collected: 04/11/23 1616    Lab Status: Final result Specimen: Blood from Arm, Right Updated: 04/11/23 1645     hs TnI 0hr 8 ng/L     HS Troponin I 2hr [[id number]]     Lab Status: No result Specimen: Blood     CBC and differential [[id number]]  (Abnormal) Collected: 04/11/23 1616    Lab Status: Final result Specimen: Blood from Arm, Right Updated: 04/11/23 1625     WBC 14 05 Thousand/uL      RBC 3 60 Million/uL      Hemoglobin 13 4 g/dL      Hematocrit 40 9 %       fL      MCH 37 2 pg      MCHC 32 8 g/dL      RDW 13 4 %      MPV 10 8 fL      Platelets 287 Thousands/uL     Manual Differential(PHLEBS Do Not Order) [[id number]] Collected: 04/11/23 1616    Lab Status: In process Specimen: Blood from Arm, Right Updated: 04/11/23 1625                 XR chest 1 view portable   ED Interpretation by Edyta Torres DO (04/11 1621)   No acute cardiopulmonary process noted  Procedures  Procedures      ED Course  ED Course as of 04/11/23 1724   Tue Apr 11, 2023   1606 Blood Pressure(!): 119/103   1606 Temperature: 98 2 °F (36 8 °C)   1606 Temp Source: Oral   1606 Pulse(!): 110   1606 Respirations: 18   1606 SpO2: 97 %  Patient is a 41-year-old male presenting to the emergency department for generalized weakness    We will evaluate patient for electrolyte abnormalities patient is status post radiation and is currently on chemotherapy  Patient with multiple episodes of diarrhea will make sure not dehydrated  Will evaluate patient with cardiac work-up CBC, CMP, troponin EKG, chest x-ray as well as fluids as he appears dry  Patient is wife are aware they have no question concerns we will frequently reevaluate  Patient with no focal deficits on exam no need to scan CT head at this time  We will frequently reevaluate  917 05 503 3/4/23: Too numerous to count metastatic lesions in bilateral cerebral hemispheres and to a lesser extent in bilateral cerebellum with perilesional vasogenic edema (bilateral frontal, bilateral parietal, right subinsular, left temporal, and bilateral occipital   lobes - worse in right frontal lobe)  1607 3/2: pet scan results    1  Findings concerning for widespread disease recurrence  2   Scattered new small foci of radiotracer uptake in the bilateral cerebral hemispheres suspicious for intracranial metastasis  Recommend evaluation with dedicated MRI with and without IV contrast   3   Scattered new subcentimeter pulmonary nodules measuring up to 5 mm  These are not FDG avid but may be too small to characterize by PET  Recommend reassessment on follow-up  4   New mildly FDG avid retroperitoneal nodule just lateral to the right psoas, suspicious for retroperitoneal metastasis  5   Scattered new FDG avid lymph nodes along the proximal iliac chains suspicious for metastasis  6   Multiple new FDG avid right inguinal lymph nodes and subcutaneous nodules extending along the right anteromedial thigh suspicious for metastasis  7   Multiple new FDG avid osseous metastasis      1624 WBC(!): 14 05  Up from 5 days ago; patient was just started on steroids as well     1701 Reached out to [facility] for admission      1701 hs TnI 0hr: 8   1702 Anion Gap(!): 15   1702 Glucose, Random(!): 146   1703 CO2: 21   1715 Spoke with patient and family who are requesting admission given the patient's recent fatigue and unable to get out of bed  Reach out to [facility] for admission for PT OT and oncology consultation with possible social work hospice palliative care  Patient and family are agreeable  1724 Patient obs to [facility]  Placed consult for pt/ot/casemanagement oncology for patient  SBIRT 20yo+    Flowsheet Row Most Recent Value   Initial Alcohol Screen: US AUDIT-C     1  How often do you have a drink containing alcohol? 0 Filed at: 04/11/2023 1653   2  How many drinks containing alcohol do you have on a typical day you are drinking? 0 Filed at: 04/11/2023 1653   3b  FEMALE Any Age, or MALE 65+: How often do you have 4 or more drinks on one occassion? 0 Filed at: 04/11/2023 1653   Audit-C Score 0 Filed at: 04/11/2023 1653   LUMA: How many times in the past year have you    Used an illegal drug or used a prescription medication for non-medical reasons? Never Filed at: 04/11/2023 1653                MDM      Disposition  Final diagnoses:   Weakness   Fatigue   Dehydration   Melanoma (HCC)   Elevated serum creatinine     Time reflects when diagnosis was documented in both MDM as applicable and the Disposition within this note     Time User Action Codes Description Comment    4/11/2023  5:22 PM Lenton Hung Add [R53 1] Weakness     4/11/2023  5:22 PM Lenton Hung Add [R53 83] Fatigue     4/11/2023  5:22 PM Palladino, Annette Add [E86 0] Dehydration     4/11/2023  5:24 PM Lenton Hung Add [C43 9] Melanoma ([facility] [address] )     4/11/2023  5:24 PM Lenton Hung Add [R79 89] Elevated serum creatinine       ED Disposition     ED Disposition   Admit    Condition   Stable    Date/Time   Tue Apr 11, 2023  5:22 PM    Comment   Case was discussed with Dr Jason Jones    None         Patient's Medications   Discharge Prescriptions    No medications on file     No discharge procedures on file      PDMP Review       Value Time User    PDMP Reviewed  Yes 3/3/2023  1:12 PM Mary Penn MD           ED Provider  Attending physically available and evaluated Ute Wang  I managed the patient along with the ED Attending      Electronically Signed by         Ayana Marley DO  04/11/23 3161

## 2023-04-11 NOTE — ASSESSMENT & PLAN NOTE
76-year-old male patient with past medical history of malignant melanoma, metastasis to brain, currently undergoing whole brain radiation  Presented with complaining of diarrhea, generalized weakness, noted dehydrated on initial evaluation in ED  Noted with chronic leukocytosis  Noted with elevated BUN, anion gap, creatinine within normal limits  Patient reports poor p o  intake lately  C  difficile & stool enteric panel pending    Currently ill-appearing, acutely nontoxic-appearing  Continue with IV hydration    Fall precaution, PT OT eval pending  Palliative care consult placed  Patient will also be treated with IV antibiotics for acute diverticulitis noted on CT abdomen

## 2023-04-11 NOTE — ASSESSMENT & PLAN NOTE
Complaining of having watery diarrhea that has been ongoing for 2 weeks  Nonbloody  Abdomen is distended on exam, nontender  C  difficile pending  Enteric panel pending    CT abdomen with acute diverticulitis- started on IV abx

## 2023-04-11 NOTE — H&P
[facility]  H&P  Name: Mary Jonas 70 y o  male I MRN: [id number]  Unit/Bed#: ED-30 I Date of Admission: 4/11/2023   Date of Service: 4/11/2023 I Hospital Day: 0      Assessment/Plan       * Generalized weakness  Assessment & Plan  21-year-old male patient with past medical history of malignant melanoma, metastasis to brain, currently undergoing brain radiation  Presented with complaining of diarrhea, generalized weakness, noted dehydrated on initial evaluation in ED      Noted with chronic leukocytosis  Noted with elevated BUN, anion gap, creatinine within normal limits  Patient reports poor p o  intake lately  C  difficile pending  Stool enteric panel pending      Currently ill-appearing, acutely nontoxic-appearing  Continue with IV hydration  Continue with supportive care  Continue to monitor off antibiotics  Fall precaution, PT OT eval   Follow-up with palliative care consult      Watery diarrhea  Assessment & Plan  Complaining of having watery diarrhea that has been ongoing for 2 weeks  Nonbloody      Abdomen is distended on exam, nontender  C  difficile pending  Enteric panel pending  Follow-up with CT abdomen pelvis report  Continue with supportive care      Leukocytosis  Assessment & Plan  Noted with chronic leukocytosis  Patient is currently on steroids  Likely multifactorial in settings of being on steroids and malignant melanoma  No signs of active overt infection noted  Continue monitor off antibiotics        Personal history of malignant melanoma  Assessment & Plan  Present on admission history of malignant melanoma with brain metastasis  Completed brain radiation 2 weeks ago  Currently on p o  chemotherapy  Overall guarded prognosis  Follow-up with palliative care consult  Continue outpatient follow-up with primary heme-onc      Essential hypertension  Assessment & Plan  Present on admission history of hypertension    Blood pressure currently stable  Hold Lasix, hydrochlorothiazide, Toprol, prazosin for now since soft blood pressure  Continue with IV fluids      Portal vein thrombosis  Assessment & Plan  Present admission history of pulmonary embolism, portal vein thrombosis currently on Xarelto         VTE Pharmacologic Prophylaxis:   Moderate Risk (Score 3-4) - Pharmacological DVT Prophylaxis Ordered: rivaroxaban (Xarelto)  Code Status: Level 1 - Full Code      Anticipated Length of Stay: Patient will be admitted on an observation basis with an anticipated length of stay of less than 2 midnights secondary to Generalized weakness, diarrhea      Total Time Spent on Date of Encounter in care of patient: 85 minutes This time was spent on one or more of the following: performing physical exam; counseling and coordination of care; obtaining or reviewing history; documenting in the medical record; reviewing/ordering tests, medications or procedures; communicating with other healthcare professionals and discussing with patient's family/caregivers      Chief Complaint: Generalized weakness, diarrhea     History of Present Illness:     Britney Bond is a 70 y o  male with a PMH of malignant melanoma, mets to brain, completed radiation therapy, currently on chemotherapy, history of hemolytic anemia was treated with rituximab, history of pulmonary embolism, portal vein thrombosis currently on Xarelto, hypertension, who presents with complaining of generalized weakness associated with diarrhea, poor appetite for last 2 weeks  Patient reports that he completed  Radiation 2 weeks ago  Symptoms started roughly around 2 weeks ago after completing radiation therapy and thinks symptoms are related to radiation  Denies fever, chills, nausea, vomiting, chest pain, dyspnea, dysuria, hematuria, melena, medic easier, any other new complaints  No other events reported    Denies smoking, any other substances      Review of Systems:  Review of Systems   Constitutional: Positive for fatigue  Negative for chills and fever  HENT: Negative for congestion  Eyes: Negative for visual disturbance  Respiratory: Negative for cough and shortness of breath  Cardiovascular: Negative for chest pain and palpitations  Gastrointestinal: Positive for diarrhea  Negative for abdominal pain, anal bleeding, blood in stool, nausea, rectal pain and vomiting  Endocrine: Negative for polyuria  Musculoskeletal: Negative for neck stiffness  Neurological: Positive for weakness     Psychiatric/Behavioral: Negative for agitation          Past Medical and Surgical History:   Medical History        Past Medical History:   Diagnosis Date   • Anemia 11/02/2016   • Anxiety     • Arthritis     • Autoimmune hemolytic anemia ([facility] )     • Claustrophobia     • COVID 12/2020   • DVT (deep venous thrombosis) (HCC)     • GERD (gastroesophageal reflux disease)     • Hearing loss, right     • Hemolytic anemia (HCC)     • History of transfusion       2018 - no adverse reaction   • Hypertension     • Malignant melanoma of leg, right ([facility] ) 07/01/2022   • Palpitation     • Portal vein thrombosis     • PTSD (post-traumatic stress disorder)     • Pulmonary emboli (HCC)     • Squamous cell skin cancer 12/20/2022     SCCIS- 12/6/22   • Tobacco abuse              Surgical History         Past Surgical History:   Procedure Laterality Date   • CATARACT EXTRACTION Bilateral     • CHOLECYSTECTOMY   07/18/2017   • COLONOSCOPY       • ELBOW ARTHROPLASTY Left       bursectomy   • IR BIOPSY RETROPERITONEUM   3/17/2023   • IR DRAINAGE TUBE CHECK WITH SCLEROSIS   10/06/2022   • IR DRAINAGE TUBE CHECK WITH SCLEROSIS   10/10/2022   • IR DRAINAGE TUBE CHECK WITH SCLEROSIS   10/20/2022   • IR DRAINAGE TUBE CHECK WITH SCLEROSIS   10/27/2022   • IR DRAINAGE TUBE CHECK WITH SCLEROSIS   11/04/2022   • IR DRAINAGE TUBE CHECK WITH SCLEROSIS   11/15/2022   • IR DRAINAGE TUBE CHECK WITH SCLEROSIS   11/25/2022   • IR DRAINAGE TUBE PLACEMENT   09/19/2022   • JOINT REPLACEMENT Right 02/02/2021     knee   • KNEE SURGERY Right       meniscus tear   • LYMPH NODE BIOPSY Right 07/29/2022     Procedure: BIOPSY LYMPH NODE SENTINEL;  Surgeon: Zhang Pace MD;  [facility]: [facility];  Service: Surgical Oncology   • MOHS SURGERY Right 12/20/2022     Right dorsal hand SCCIS-Dr Isabel   • NV ARTHRP KNE CONDYLE&PLATU MEDIAL&LAT COMPARTMENTS Right 02/02/2021     Procedure: ARTHROPLASTY KNEE TOTAL;  Surgeon: Pop Heller MD;  [facility];  Service: Orthopedics   • NV ARTHRP KNE CONDYLE&PLATU MEDIAL&LAT COMPARTMENTS Left 11/17/2021     Procedure: TOTAL KNEE REPLACEMENT;  Surgeon: Pop Heller MD;  [facility];  Service: Orthopedics   • NV LAPS SURG CHOLECYSTECTOMY Kenan Walkers N/A 12/23/2017     Procedure: CHOLECYSTECTOMY LAPAROSCOPIC with cholangiogram;  Surgeon: Matt Castle MD;  Location[facility];  Service: General   • NV SPLENECTOMY TOTAL SEPARATE PROCEDURE N/A 05/18/2017     Procedure: LAPAROSCOPIC HAND ASSIST SPLENECTOMY;  Surgeon: Placido Resendiz MD;  [facility];  Service: Surgical Oncology   • SHOULDER SURGERY Left       rotator cuff x4, reconstruction   • SKIN BIOPSY   5 May 2022   • SKIN CANCER EXCISION   29 July 2022   • SKIN LESION EXCISION Right 07/29/2022     Procedure: WIDE EXCISION RIGHT MEDIAL THIGH;  Surgeon: Zhang Pace MD;  [facility]: [facility];  Service: Surgical Oncology            Meds/Allergies:          Prior to Admission medications    Medication Sig Start Date End Date Taking?  Authorizing Provider   acetaminophen (TYLENOL) 325 mg tablet Take 2 tablets (650 mg total) by mouth every 6 (six) hours as needed for mild pain 2/3/21   Yes Kasey Casillas PA-C   ALPRAZolam Thresa Pile) 0 5 mg tablet Take 1 tablet (0 5 mg total) by mouth 2 (two) times a day Take one tablet one hour before scan and bring the second one with you to take right before the MRI if needed 3/3/23   Yes Ashley Ly MD dabigatran etexilate (PRADAXA) 150 mg capsu Take 1 capsule (150 mg total) by mouth every 12 (twelve) hours 8/4/22   Yes Tiki Haile MD   dexamethasone (DECADRON) 4 mg tablet Take 1 tablet (4 mg total) by mouth every 6 (six) hours Please make sure you're on Protonix 40 mg daily for GI prophylaxis 4/6/23   Yes Marcelo Luong MD   encorafenib (BRAFTOVI) 75 MG capsule TAKE SIX CAPSULES BY MOUTH DAILY (APPROVED) 11/8/22 11/3/23 Yes Historical Provider, MD   furosemide (LASIX) 20 mg tablet Take 1 tablet (20 mg total) by mouth daily 8/24/22   Yes VISHNU Lehman   hydrochlorothiazide (HYDRODIURIL) 25 mg tablet Take 1 tablet (25 mg total) by mouth daily  Patient taking differently: Take 25 mg by mouth every morning 2/15/21   Yes Yudelka Ramsey DO   metoprolol succinate (TOPROL-XL) 25 mg 24 hr tablet Take 0 5 tablets (12 5 mg total) by mouth daily  Patient taking differently: Take 12 5 mg by mouth every morning 1/4/21   Yes Yudelka Ramsey DO   ondansetron [facility]) 4 mg tablet Take 1 tablet (4 mg total) by mouth every 8 (eight) hours as needed for nausea or vomiting 11/17/22   Yes Lizzy Rader MD   pantoprazole (PROTONIX) 40 mg tablet Take 1 tablet (40 mg total) by mouth daily  Patient taking differently: Take 40 mg by mouth every morning 8/27/20   Yes Yudelka Ramsey DO   pantoprazole (PROTONIX) 40 mg tablet Take 1 tablet (40 mg total) by mouth daily 3/4/23   Yes Mca Canales DO   potassium chloride (K-DUR,KLOR-CON) 20 mEq tablet Take 1 tablet (20 mEq total) by mouth daily  Patient taking differently: Take 20 mEq by mouth every morning 12/29/20   Yes Yudelka Ramsey DO   prazosin (MINIPRESS) 1 mg capsule Take 1 mg by mouth daily at bedtime  12/8/20   Yes Historical Provider, MD   predniSONE 20 mg tablet Take 1 tablet (20 mg total) by mouth daily 2/17/23   Yes Emma Li DO   sulfamethoxazole-trimethoprim (BACTRIM DS) 800-160 mg per tablet Take 1 tablet by mouth 3 (three) times a week Monday, Wednesday and Friday 8/5/22 4/30/25 Yes Nimco Fulton MD   VITAMIN D PO Take 1,000 Units by mouth daily      Yes Historical Provider, MD   ascorbic acid (VITAMIN C) 1000 MG tablet Take 1 tablet (1,000 mg total) by mouth every 12 (twelve) hours for 6 doses  Patient taking differently: Take 1,000 mg by mouth daily Every other day 12/22/20 12/6/22   Erick Pat MD   benzonatate (TESSALON PERLES) 100 mg capsule Take 1 capsule (100 mg total) by mouth 3 (three) times a day 8/5/22     Nimco Fulton MD   binimetinib (MEKTOVI) 15 MG tablet TAKE THREE TABLETS BY MOUTH EVERY 12 HOURS (APPROVED) 11/8/22 11/3/23   Historical Provider, MD   memantine (NAMENDA TITRATION PACK) Follow package directions and titration schedule reviewed at consultation  Call Radiation Oncology with questions  3/7/23     Roseann Barahona MD   sodium chloride, PF, 0 9 % 10 mL by Intracatheter route daily Intracatheter flushing daily   May substitute prefilled syringe with normal saline 10 mL vials, 10 mL syringes, and 18 g blunt needles 9/19/22 12/18/22   Elsa Evangelista MD      I have reviewed home medications with patient personally      Allergies: No Known Allergies     Social History:  Marital Status: /Civil Union      Substance Use History:   Social History           Substance and Sexual Activity   Alcohol Use Yes   • Alcohol/week: 6 0 standard drinks   • Types: 6 Cans of beer per week     Comment: socially      Social History           Tobacco Use   Smoking Status Some Days   • Packs/day: 0 00   • Years: 5 00   • Pack years: 0 00   • Types: Cigars, Cigarettes   Smokeless Tobacco Current   • Types: Snuff   Tobacco Comments     5  a week average      Social History           Substance and Sexual Activity   Drug Use Yes   • Types: Marijuana     Comment: medical marijuana         Family History:  Family History         Family History   Problem Relation Age of Onset   • Cancer Mother     • Breast cancer Mother     • Other Father           CABG   • Heart attack Paternal Grandfather           acute MI            Physical Exam:      Vitals:   Blood Pressure: 146/83 (04/11/23 1724)  Pulse: 96 (04/11/23 1724)  Temperature: 98 2 °F (36 8 °C) (04/11/23 1528)  Temp Source: Oral (04/11/23 1528)  Respirations: 20 (04/11/23 1724)  Weight - Scale: 88 5 kg (195 lb) (04/11/23 1528)  SpO2: 97 % (04/11/23 1724)     Physical Exam  Constitutional:       General: He is not in acute distress  Appearance: Normal appearance  He is obese  He is not ill-appearing  HENT:      Head: Normocephalic and atraumatic  Eyes:      Pupils: Pupils are equal, round, and reactive to light  Cardiovascular:      Rate and Rhythm: Normal rate  Pulses: Normal pulses  Pulmonary:      Effort: Pulmonary effort is normal  No respiratory distress  Breath sounds: Normal breath sounds  No wheezing  Abdominal:      General: There is distension  Palpations: Abdomen is soft  Tenderness: There is no abdominal tenderness  There is no guarding  Musculoskeletal:      Cervical back: No rigidity  Right lower leg: No edema  Left lower leg: No edema  Neurological:      Mental Status: He is alert and oriented to person, place, and time  Mental status is at baseline     Psychiatric:         Mood and Affect: Mood normal          Behavior: Behavior normal          Additional Data:      Lab Results:       Results from last 7 days   Lab Units 04/11/23  1616   WBC Thousand/uL 14 05*   HEMOGLOBIN g/dL 13 4   HEMATOCRIT % 40 9   PLATELETS Thousands/uL 246   BANDS PCT % 4   LYMPHO PCT % 0*   MONO PCT % 1*   EOS PCT % 1           Results from last 7 days   Lab Units 04/11/23  1616   SODIUM mmol/L 138   POTASSIUM mmol/L 3 7   CHLORIDE mmol/L 102   CO2 mmol/L 21   BUN mg/dL 29*   CREATININE mg/dL 1 21   ANION GAP mmol/L 15*   CALCIUM mg/dL 9 1   ALBUMIN g/dL 3 7   TOTAL BILIRUBIN mg/dL 0 93   ALK PHOS U/L 74   ALT U/L 35   AST U/L 43*   GLUCOSE RANDOM mg/dL 146*                         Lines/Drains:      Invasive Devices            Peripheral Intravenous Line  Duration             Peripheral IV 04/11/23 Left Antecubital <1 day                          Imaging:   XR chest 1 view portable   ED Interpretation by Mark Saldana DO (04/11 1621)   No acute cardiopulmonary process noted         CT abdomen pelvis wo contrast    (Results Pending)         EKG and Other Studies Reviewed on Admission:   • EKG: Normal sinus rhythm      ** Please Note: This note has been constructed using a voice recognition system   **

## 2023-04-11 NOTE — ASSESSMENT & PLAN NOTE
Noted with chronic leukocytosis  Patient is currently on steroids  Likely multifactorial in settings of being on steroids and malignant melanoma    And now newfound acute diverticulitis  Antibiotics started as above

## 2023-04-12 PROBLEM — K57.92 ACUTE DIVERTICULITIS: Status: ACTIVE | Noted: 2023-01-01

## 2023-04-12 LAB
ALBUMIN SERPL BCP-MCNC: 3.2 G/DL (ref 3.5–5)
ALP SERPL-CCNC: 62 U/L (ref 34–104)
ALT SERPL W P-5'-P-CCNC: 27 U/L (ref 7–52)
ANION GAP SERPL CALCULATED.3IONS-SCNC: 7 MMOL/L (ref 4–13)
AST SERPL W P-5'-P-CCNC: 19 U/L (ref 13–39)
ATRIAL RATE: 116 BPM
ATRIAL RATE: 95 BPM
BILIRUB SERPL-MCNC: 0.44 MG/DL (ref 0.2–1)
BUN SERPL-MCNC: 29 MG/DL (ref 5–25)
CALCIUM ALBUM COR SERPL-MCNC: 9.5 MG/DL (ref 8.3–10.1)
CALCIUM SERPL-MCNC: 8.9 MG/DL (ref 8.4–10.2)
CHLORIDE SERPL-SCNC: 106 MMOL/L (ref 96–108)
CO2 SERPL-SCNC: 27 MMOL/L (ref 21–32)
CREAT SERPL-MCNC: 0.92 MG/DL (ref 0.6–1.3)
ERYTHROCYTE [DISTWIDTH] IN BLOOD BY AUTOMATED COUNT: 13.4 % (ref 11.6–15.1)
GFR SERPL CREATININE-BSD FRML MDRD: 84 ML/MIN/1.73SQ M
GLUCOSE SERPL-MCNC: 190 MG/DL (ref 65–140)
HCT VFR BLD AUTO: 33.7 % (ref 36.5–49.3)
HGB BLD-MCNC: 10.9 G/DL (ref 12–17)
MCH RBC QN AUTO: 36.8 PG (ref 26.8–34.3)
MCHC RBC AUTO-ENTMCNC: 32.3 G/DL (ref 31.4–37.4)
MCV RBC AUTO: 114 FL (ref 82–98)
P AXIS: 38 DEGREES
P AXIS: 50 DEGREES
PLATELET # BLD AUTO: 275 THOUSANDS/UL (ref 149–390)
PMV BLD AUTO: 10.5 FL (ref 8.9–12.7)
POTASSIUM SERPL-SCNC: 3.5 MMOL/L (ref 3.5–5.3)
PR INTERVAL: 138 MS
PR INTERVAL: 154 MS
PROT SERPL-MCNC: 5.5 G/DL (ref 6.4–8.4)
QRS AXIS: 54 DEGREES
QRS AXIS: 78 DEGREES
QRSD INTERVAL: 76 MS
QRSD INTERVAL: 80 MS
QT INTERVAL: 312 MS
QT INTERVAL: 354 MS
QTC INTERVAL: 433 MS
QTC INTERVAL: 444 MS
RBC # BLD AUTO: 2.96 MILLION/UL (ref 3.88–5.62)
SODIUM SERPL-SCNC: 140 MMOL/L (ref 135–147)
T WAVE AXIS: 19 DEGREES
T WAVE AXIS: 26 DEGREES
VENTRICULAR RATE: 116 BPM
VENTRICULAR RATE: 95 BPM
WBC # BLD AUTO: 10.65 THOUSAND/UL (ref 4.31–10.16)

## 2023-04-12 RX ORDER — MEMANTINE HYDROCHLORIDE 5 MG/1
5 TABLET ORAL
Status: DISCONTINUED | OUTPATIENT
Start: 2023-04-12 | End: 2023-04-14 | Stop reason: HOSPADM

## 2023-04-12 RX ORDER — METOPROLOL SUCCINATE 25 MG/1
12.5 TABLET, EXTENDED RELEASE ORAL EVERY MORNING
Status: DISCONTINUED | OUTPATIENT
Start: 2023-04-12 | End: 2023-04-14 | Stop reason: HOSPADM

## 2023-04-12 RX ORDER — MEMANTINE HYDROCHLORIDE 5 MG/1
10 TABLET ORAL DAILY
Status: DISCONTINUED | OUTPATIENT
Start: 2023-04-13 | End: 2023-04-14 | Stop reason: HOSPADM

## 2023-04-12 RX ORDER — PRAZOSIN HYDROCHLORIDE 1 MG/1
1 CAPSULE ORAL
Status: DISCONTINUED | OUTPATIENT
Start: 2023-04-12 | End: 2023-04-14 | Stop reason: HOSPADM

## 2023-04-12 RX ORDER — METRONIDAZOLE 500 MG/1
500 TABLET ORAL EVERY 8 HOURS SCHEDULED
Status: DISCONTINUED | OUTPATIENT
Start: 2023-04-12 | End: 2023-04-14 | Stop reason: HOSPADM

## 2023-04-12 RX ORDER — CEFEPIME HYDROCHLORIDE 1 G/50ML
1000 INJECTION, SOLUTION INTRAVENOUS EVERY 12 HOURS
Status: DISCONTINUED | OUTPATIENT
Start: 2023-04-12 | End: 2023-04-14 | Stop reason: HOSPADM

## 2023-04-12 RX ADMIN — METRONIDAZOLE 500 MG: 500 TABLET ORAL at 13:33

## 2023-04-12 RX ADMIN — MEMANTINE 7.5 MG: 5 TABLET ORAL at 01:01

## 2023-04-12 RX ADMIN — PANTOPRAZOLE SODIUM 40 MG: 40 TABLET, DELAYED RELEASE ORAL at 05:23

## 2023-04-12 RX ADMIN — MEMANTINE 5 MG: 5 TABLET ORAL at 18:30

## 2023-04-12 RX ADMIN — BENZONATATE 100 MG: 100 CAPSULE ORAL at 21:22

## 2023-04-12 RX ADMIN — POTASSIUM CHLORIDE 20 MEQ: 1500 TABLET, EXTENDED RELEASE ORAL at 08:46

## 2023-04-12 RX ADMIN — DABIGATRAN ETEXILATE MESYLATE 150 MG: 150 CAPSULE ORAL at 21:22

## 2023-04-12 RX ADMIN — DEXAMETHASONE 4 MG: 4 TABLET ORAL at 01:02

## 2023-04-12 RX ADMIN — BENZONATATE 100 MG: 100 CAPSULE ORAL at 15:28

## 2023-04-12 RX ADMIN — METOPROLOL SUCCINATE 12.5 MG: 25 TABLET, EXTENDED RELEASE ORAL at 13:33

## 2023-04-12 RX ADMIN — CEFEPIME HYDROCHLORIDE 1000 MG: 1 INJECTION, SOLUTION INTRAVENOUS at 14:44

## 2023-04-12 RX ADMIN — SODIUM CHLORIDE, SODIUM LACTATE, POTASSIUM CHLORIDE, AND CALCIUM CHLORIDE 100 ML/HR: .6; .31; .03; .02 INJECTION, SOLUTION INTRAVENOUS at 19:24

## 2023-04-12 RX ADMIN — DEXAMETHASONE 4 MG: 4 TABLET ORAL at 08:47

## 2023-04-12 RX ADMIN — DABIGATRAN ETEXILATE MESYLATE 150 MG: 150 CAPSULE ORAL at 08:45

## 2023-04-12 RX ADMIN — BENZONATATE 100 MG: 100 CAPSULE ORAL at 08:45

## 2023-04-12 RX ADMIN — ALPRAZOLAM 0.5 MG: 0.5 TABLET ORAL at 08:45

## 2023-04-12 RX ADMIN — METRONIDAZOLE 500 MG: 500 TABLET ORAL at 21:22

## 2023-04-12 RX ADMIN — ALPRAZOLAM 0.5 MG: 0.5 TABLET ORAL at 21:22

## 2023-04-12 NOTE — PHYSICAL THERAPY NOTE
Alexi Capellan a 70 y  o  male with a PMH of malignant melanoma, mets to brain, completed radiation therapy, currently on chemotherapy, history of hemolytic anemia was treated with rituximab, history of pulmonary embolism, portal vein thrombosis currently on Xarelto, hypertension, who presents with complaining of generalized weakness associated with diarrhea, poor appetite for last 2 weeks  Admitted with dehydration, leukocytosis, acute diverticulitis  PT consulted  Up and OOB as tolerated

## 2023-04-12 NOTE — PROGRESS NOTES
[address] [place] [address]  Progress Note  Name: Kasey Lazo  MRN: [id number]  Unit/Bed#: E2 -01 I Date of Admission: 4/11/2023   Date of Service: 4/12/2023 I Hospital Day: 0    Assessment/Plan   * Generalized weakness  Assessment & Plan  68-year-old male patient with past medical history of malignant melanoma, metastasis to brain, currently undergoing whole brain radiation  Presented with complaining of diarrhea, generalized weakness, noted dehydrated on initial evaluation in ED  Noted with chronic leukocytosis  Noted with elevated BUN, anion gap, creatinine within normal limits  Patient reports poor p o  intake lately  C  difficile & stool enteric panel pending    Currently ill-appearing, acutely nontoxic-appearing  Continue with IV hydration  Fall precaution, PT OT eval pending  Palliative care consult placed  Patient will also be treated with IV antibiotics for acute diverticulitis noted on CT abdomen    Acute diverticulitis  Assessment & Plan  Patient presented with 2 weeks of diarrhea, decreased appetite, generalized weakness  CT abdomen with mild acute diverticulitis of descending colon and proximal sigmoid colon  No bowel obstruction  Given immunocompromised state as well as tachycardia and leukocytosis we will proceed with IV antibiotics consisting of IV cefepime and metronidazole    Watery diarrhea  Assessment & Plan  Complaining of having watery diarrhea that has been ongoing for 2 weeks  Nonbloody  Abdomen is distended on exam, nontender  C  difficile pending  Enteric panel pending  CT abdomen with acute diverticulitis- started on IV abx    Leukocytosis  Assessment & Plan  Noted with chronic leukocytosis  Patient is currently on steroids  Likely multifactorial in settings of being on steroids and malignant melanoma    And now newfound acute diverticulitis  Antibiotics started as above    Personal history of malignant melanoma  Assessment & Plan  Present on admission history of malignant melanoma with brain metastasis  Currently undergoing whole brain radiation  Currently on p o  chemotherapy- hold in the setting of acute infection  Overall guarded prognosis  Palliative care consult placed -can followup in outpt setting  Continue outpatient follow-up with primary heme-onc  Messaged on call oncologist to make aware of current hospitalization and plan    Essential hypertension  Assessment & Plan  Present on admission history of hypertension  Had some lower blood pressures on admission  Therefore held Lasix, hydrochlorothiazide, Toprol, prazosin  Blood pressures with rise to the 140s with IV fluids  We will resume Toprol and prazosin at this time with hold parameters in place    Portal vein thrombosis  Assessment & Plan  History of pulmonary embolism, portal vein thrombosis 07/2022   Anticoagulated on Pradaxa    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  Assessment & Plan  Results from last 7 days   Lab Units 04/12/23  0536 04/11/23  1616 04/06/23  1038   HEMOGLOBIN g/dL 10 9* 13 4 14 2   Baseline appearing to be around 12-14  Suspect drop in the setting of recent IV fluid dilution  We will continue to monitor in the setting of acute diverticulitis  Unfortunately place steroids on hold at this time         VTE Pharmacologic Prophylaxis:   Pharmacologic: Dabigatran (Pradaxa)  Mechanical VTE Prophylaxis in Place: Yes    Discharge Plan: with need for continued inpatient stay for IV abx    Discussions with Specialists or Other Care Team Provider: nursing, palliative care, oncology    Education and Discussions with Family / Patient: patient, wife at bedside    Time Spent for Care: 1 hour 30mins  More than 50% of total time spent on counseling and coordination of care as described above  Current Length of Stay: 0 day(s)  Current Patient Status: Inpatient   Code Status: Level 1 - Full Code    Subjective:   Seen earlier while resting in bed    He reports his diarrhea has slowed since being in the hospital  Prior to this he was going very frequently for the past 2 weeks  He feels better after receiving IV fluid hydration  Still weak overall  Discussed with wife at bedside findings on CT abdomen and treatment with IV antibiotics  Had long conversation with wife - prior to these past two weeks, patient was very independent, dress himself, cut the grass, and even went golfing  She is open to meeting with palliative care but wants to continue current treatment plan as outlined with the oncologists  Objective:     Vitals:   Temp (24hrs), Av 5 °F (36 9 °C), Min:97 1 °F (36 2 °C), Max:99 9 °F (37 7 °C)    Temp:  [97 1 °F (36 2 °C)-99 9 °F (37 7 °C)] 97 1 °F (36 2 °C)  HR:  [] 71  Resp:  [18-20] 20  BP: (119-152)/() 140/90  SpO2:  [95 %-97 %] 95 %  Body mass index is 29 65 kg/m²  Input and Output Summary (last 24 hours): Intake/Output Summary (Last 24 hours) at 2023 1257  Last data filed at 2023 1717  Gross per 24 hour   Intake 1000 ml   Output --   Net 1000 ml       Physical Exam:     Physical Exam  Vitals and nursing note reviewed  Constitutional:       General: He is not in acute distress  Appearance: Normal appearance  He is normal weight  He is not ill-appearing, toxic-appearing or diaphoretic  HENT:      Head: Normocephalic and atraumatic  Eyes:      General: No scleral icterus  Cardiovascular:      Rate and Rhythm: Normal rate and regular rhythm  Pulmonary:      Effort: Pulmonary effort is normal  No respiratory distress  Breath sounds: Normal breath sounds  No stridor  No wheezing or rhonchi  Abdominal:      General: Abdomen is flat  Bowel sounds are normal  There is no distension  Palpations: Abdomen is soft  There is no mass  Tenderness: There is abdominal tenderness  Hernia: No hernia is present  Musculoskeletal:         General: No swelling        Cervical back: Normal range of motion and neck supple  Skin:     General: Skin is warm and dry  Neurological:      Mental Status: He is oriented to person, place, and time  Mental status is at baseline  Psychiatric:         Mood and Affect: Mood normal          Behavior: Behavior normal          Additional Data:     Labs:    Results from last 7 days   Lab Units 04/12/23  0536 04/11/23  1616   WBC Thousand/uL 10 65* 14 05*   HEMOGLOBIN g/dL 10 9* 13 4   HEMATOCRIT % 33 7* 40 9   PLATELETS Thousands/uL 275 246   LYMPHO PCT %  --  0*   MONO PCT %  --  1*   EOS PCT %  --  1     Results from last 7 days   Lab Units 04/12/23  0536   POTASSIUM mmol/L 3 5   CHLORIDE mmol/L 106   CO2 mmol/L 27   BUN mg/dL 29*   CREATININE mg/dL 0 92   CALCIUM mg/dL 8 9   ALK PHOS U/L 62   ALT U/L 27   AST U/L 19           * I Have Reviewed All Lab Data Listed Above  * Additional Pertinent Lab Tests Reviewed:  [facility] Admission Reviewed    Imaging:    Imaging Reports Reviewed Today Include: ct abdomen  Imaging Personally Reviewed by Myself Includes:      Recent Cultures (last 7 days):           Last 24 Hours Medication List:   Current Facility-Administered Medications   Medication Dose Route Frequency Provider Last Rate   • acetaminophen  650 mg Oral Q6H PRN Luis Alberto JARQUIN MD     • ALPRAZolam  0 5 mg Oral BID Luis Alberto JARQUIN MD     • benzonatate  100 mg Oral TID Luis Alberto JARQUIN MD     • cefepime  1,000 mg Intravenous Q12H Chrissy Mireles PA-C     • dabigatran etexilate  150 mg Oral Q12H [facility] Luis Alberto JARQUIN MD     • lactated ringers  100 mL/hr Intravenous Continuous Luis Alberto JARQUIN  mL/hr (04/11/23 2007)   • memantine  7 5 mg Oral BID Conchis Chan PA-C     • metoprolol succinate  12 5 mg Oral QAM Chrissy Mireles PA-C     • metroNIDAZOLE  500 mg Oral Q8H [facility] Chrissy Mireles PA-C     • ondansetron  4 mg Oral Q8H PRN Luis Alberto JARQUIN MD     • pantoprazole  40 mg Oral Early Morning Luis Alberto JARQUIN MD     • potassium chloride  20 mEq Oral FAUZIA Sahu Wally Jefferson MD     • prazosin  1 mg Oral HS Kim Knox PA-C          Today, Patient Was Seen By: Kim Knox PA-C    ** Please Note: This note has been constructed using a voice recognition system   **

## 2023-04-12 NOTE — ASSESSMENT & PLAN NOTE
Patient presented with 2 weeks of diarrhea, decreased appetite, generalized weakness  CT abdomen with mild acute diverticulitis of descending colon and proximal sigmoid colon  No bowel obstruction    Given immunocompromised state as well as tachycardia and leukocytosis we will proceed with IV antibiotics consisting of IV cefepime and metronidazole

## 2023-04-12 NOTE — SOCIAL WORK
MSW met with the pt at bedside and his wife Karthik Dodd was on the phone  MSW introduced herself and informed them she was with the Palliative team     The pt wife stated she was not sure why Palliative was contacted and stated she was very surprised that hospice was discussed as well  The pt wife states that the pt was playing golf not to long ago  MSW stated she would be able to provide information if there were questions about Palliative or hospice  Both the pt and his wife stated they did not need to discuss either  The pt wife states if he was not able to be mobile again and was in the same state he was at admission, she may need the Palliative/Hospice team assistance  The pt states he is able to walk and move around at this time  The pt wife stated she may need Home care at some point, but she is not sure  PT/OT is needed  MSW spoke with the Provider Dr Will Chavez and she requested the consult be cancelled  I have spent 20 minutes with Patient at bedside and wife Karthik Dodd on the phone today in which greater than 50% of this time was spent in counseling/coordination of care regarding ongoing emotional support      Palliative  will follow-up as requested by patient, family, and primary team   Please contact with any specific requests

## 2023-04-12 NOTE — OCCUPATIONAL THERAPY NOTE
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    Portal vein thrombosis    Essential hypertension    Personal history of malignant melanoma    Leukocytosis    Watery diarrhea    Acute diverticulitis    Past Medical History  Past Medical History:   Diagnosis Date    Anemia 11/02/2016    Anxiety     Arthritis     Autoimmune hemolytic anemia ([address] )     Claustrophobia     COVID 12/2020    DVT (deep venous thrombosis) (HCC)     GERD (gastroesophageal reflux disease)     Hearing loss, right     Hemolytic anemia ([address] )     History of transfusion     2018 - no adverse reaction    Hypertension     Malignant melanoma of leg, right (HCC) 07/01/2022    Palpitation     Portal vein thrombosis     PTSD (post-traumatic stress disorder)     Pulmonary emboli (HCC)     Squamous cell skin cancer 12/20/2022    SCCIS- 12/6/22    Tobacco abuse      Past Surgical History  Past Surgical History:   Procedure Laterality Date    CATARACT EXTRACTION Bilateral     CHOLECYSTECTOMY  07/18/2017    COLONOSCOPY      ELBOW ARTHROPLASTY Left     bursectomy    IR BIOPSY RETROPERITONEUM  3/17/2023    IR DRAINAGE TUBE CHECK WITH SCLEROSIS  10/06/2022    IR DRAINAGE TUBE CHECK WITH SCLEROSIS  10/10/2022    IR DRAINAGE TUBE CHECK WITH SCLEROSIS  10/20/2022    IR DRAINAGE TUBE CHECK WITH SCLEROSIS  10/27/2022    IR DRAINAGE TUBE CHECK WITH SCLEROSIS  11/04/2022    IR DRAINAGE TUBE CHECK WITH SCLEROSIS  11/15/2022    IR DRAINAGE TUBE CHECK WITH SCLEROSIS  11/25/2022    IR DRAINAGE TUBE PLACEMENT  09/19/2022    JOINT REPLACEMENT Right 02/02/2021    knee    KNEE SURGERY Right     meniscus tear    LYMPH NODE BIOPSY Right 07/29/2022    Procedure: BIOPSY LYMPH NODE SENTINEL;  Surgeon: Hina Rasheed MD;  [facility];  Service: Surgical Oncology    MOHS SURGERY Right 12/20/2022    Right dorsal hand MIRIAM-Dr Isabel    [place] ARTHRP KNE CONDYLE&PLATU MEDIAL&LAT COMPARTMENTS Right 02/02/2021    Procedure: ARTHROPLASTY KNEE TOTAL;  Surgeon: Negar Dai MD;  [facility];  Service: Orthopedics    TX ARTHRP KNE CONDYLE&PLATU MEDIAL&LAT COMPARTMENTS Left 11/17/2021    Procedure: TOTAL KNEE REPLACEMENT;  Surgeon: Negar Dai MD;  [facility];  Service: Orthopedics    TX LAPS SURG Charis Laws N/A 12/23/2017    Procedure: CHOLECYSTECTOMY LAPAROSCOPIC with cholangiogram;  Surgeon: Juan Adorno MD;  [facility];  Service: General    TX SPLENECTOMY TOTAL SEPARATE PROCEDURE N/A 05/18/2017    Procedure: LAPAROSCOPIC HAND ASSIST SPLENECTOMY;  Surgeon: Daja Oviedo MD;  [facility];  Service: Surgical Oncology    SHOULDER SURGERY Left     rotator cuff x4, reconstruction    SKIN BIOPSY  5 May 2022    SKIN CANCER EXCISION  29 July 2022    SKIN LESION EXCISION Right 07/29/2022    Procedure: WIDE EXCISION RIGHT MEDIAL THIGH;  Surgeon: Ramez Loyd MD;  [facility];  Service: Surgical Oncology         04/12/23 1434   OT Last Visit   OT Visit Date 04/12/23   Note Type   Note type Evaluation   Additional Comments pt greeted in supine and agreeable to skilled OT evaluation  Pain Assessment   Pain Assessment Tool 0-10   Pain Score No Pain   Restrictions/Precautions   Weight Bearing Precautions Per Order No   Other Precautions Contact/isolation;Multiple lines; Fall Risk  (Chemo precautions)   Home Living   Type of [address] [place] [address] Multi-level;1/2 bath on main level;Bed/bath upstairs   Bathroom Shower/Tub Tub/shower unit   Bathroom Toilet Standard   Bathroom Equipment Grab bars in shower; Shower chair   Home Equipment Walker;Cane   Prior Function   Level of [place] Independent with ADLs; Independent with functional mobility; Independent with IADLS   Lives With Spouse   Receives Help From Family   IADLs Independent with driving; Independent with meal prep; Independent with medication management   Falls in the last 6 months 0   Vocational Retired   Comments Prior to admission, pt lives with wife in a multi level home with 0 SIDRA, 1 FOS to 2nd floor bed / bath  1/2 bath on main level  Home has a tub shower with seat and grab bars, standard toilets  Owns a cane and RW- Does not use  Reports he was I with ADLs, IADLs and mobility  Drives  Retired  Reports 0 falls in the past 6 months  Lifestyle   Autonomy I with ADLs, IADLs and mobility  Reciprocal Relationships family  ADL   Where Assessed Edge of bed   Eating Assistance 7  Independent   Grooming Assistance 7  Independent   UB Bathing Assistance 6  Modified Independent   LB Bathing Assistance 5  Supervision/Setup   UB Dressing Assistance 6  Modified independent   LB Dressing Assistance 5  Supervision/Setup   Toileting Assistance  5  Supervision/Setup   Bed Mobility   Supine to Sit 5  Supervision   Additional items Increased time required;Verbal cues   Transfers   Sit to Stand 5  Supervision   Additional items Increased time required;Verbal cues   Stand to Sit 5  Supervision   Additional items Increased time required;Verbal cues   Functional Mobility   Functional Mobility 5  Supervision   Additional Comments no device, household distances     Balance   Static Sitting Fair +   Dynamic Sitting Fair   Static Standing Fair +   Dynamic Standing Fair   Ambulatory Fair   Activity Tolerance   Activity Tolerance Patient tolerated treatment well   Medical Staff Made Aware PT Anna   Nurse Made Aware YASMIN Suarez   RUE Assessment   RUE Assessment WFL   LUE Assessment   LUE Assessment WFL   Hand Function   Gross Motor Coordination Functional   Fine Motor Coordination Functional   Vision-Basic Assessment   Current Vision Wears glasses all the time   Psychosocial   Psychosocial (WDL) WDL   Cognition   Overall Cognitive Status WFL   Arousal/Participation Cooperative   Attention Within functional limits   Orientation Level Oriented X4   Following Commands Follows one step commands without difficulty   Assessment   Assessment Cliff Cardoso is a 70 y o  male with a PMH of malignant melanoma, mets to brain, completed radiation therapy, currently on chemotherapy, history of hemolytic anemia was treated with rituximab, history of pulmonary embolism, portal vein thrombosis currently on Xarelto, hypertension, who presents with complaining of generalized weakness associated with diarrhea, poor appetite for last 2 weeks  Admitted with dehydration, leukocytosis, acute diverticulitis  Pt with active orders for OT and up OOB as tolerated  Prior to admission, pt lives with wife in a multi level home with 0 SIDRA, 1 FOS to 2nd floor bed / bath  1/2 bath on main level  Home has a tub shower with seat and grab bars, standard toilets  Owns a cane and RW- Does not use  Reports he was I with ADLs, IADLs and mobility  Drives  Retired  Reports 0 falls in the past 6 months  Upon evaluation, pt presenting close to functional baseline with ADLs/ IADLs, functional transfers and functional mobility  Pt current level of function: bed mobility - supervision; transfers- supervision; functional mobility-supervision no device household distances; UB dressing / bathing - Rafael; LB dressing/ bathing- supervision; toileting - supervision  Skilled OT not warranted at this time  OT DC recommendation: no rehab needs  Goals   Patient Goals to go home  Plan   OT Frequency Eval only  (DC OT)   Recommendation   OT Discharge Recommendation No rehabilitation needs   Additional Comments  The patient's raw score on the AM-PAC Daily Activity Inpatient Short Form is 21  A raw score of greater than or equal to 19 suggests the patient may benefit from discharge to home  Please refer to the recommendation of the Occupational Therapist for safe discharge planning     AM-PAC Daily Activity Inpatient   Lower Body Dressing 3   Bathing 3   Toileting 3   Upper Body Dressing 4 Grooming 4   Eating 4   Daily Activity Raw Score 21   Daily Activity Standardized Score (Calc for Raw Score >=11) 44 27   AM-PAC Applied Cognition Inpatient   Following a Speech/Presentation 4   Understanding Ordinary Conversation 4   Taking Medications 4   Remembering Where Things Are Placed or Put Away 4   Remembering List of 4-5 Errands 4   Taking Care of Complicated Tasks 4   Applied Cognition Raw Score 24   Applied Cognition Standardized Score 62 21   Mari Calhoun, OT

## 2023-04-12 NOTE — PHYSICAL THERAPY NOTE
PT EVALUATION 14;10-14:34    70 y o     [id number]    Dehydration [E86 0]  Melanoma ([facility] ) [C43 9]  Fatigue [R53 83]  Weakness [R53 1]  Elevated serum creatinine [R79 89]    Past Medical History:   Diagnosis Date    Anemia 11/02/2016    Anxiety     Arthritis     Autoimmune hemolytic anemia ([facility] )     Claustrophobia     COVID 12/2020    DVT (deep venous thrombosis) (HCC)     GERD (gastroesophageal reflux disease)     Hearing loss, right     Hemolytic anemia ([facility] )     History of transfusion     2018 - no adverse reaction    Hypertension     Malignant melanoma of leg, right ([facility] ) 07/01/2022    Palpitation     Portal vein thrombosis     PTSD (post-traumatic stress disorder)     Pulmonary emboli (HCC)     Squamous cell skin cancer 12/20/2022    SCCIS- 12/6/22    Tobacco abuse          Past Surgical History:   Procedure Laterality Date    CATARACT EXTRACTION Bilateral     CHOLECYSTECTOMY  07/18/2017    COLONOSCOPY      ELBOW ARTHROPLASTY Left     bursectomy    IR BIOPSY RETROPERITONEUM  3/17/2023    IR DRAINAGE TUBE CHECK WITH SCLEROSIS  10/06/2022    IR DRAINAGE TUBE CHECK WITH SCLEROSIS  10/10/2022    IR DRAINAGE TUBE CHECK WITH SCLEROSIS  10/20/2022    IR DRAINAGE TUBE CHECK WITH SCLEROSIS  10/27/2022    IR DRAINAGE TUBE CHECK WITH SCLEROSIS  11/04/2022    IR DRAINAGE TUBE CHECK WITH SCLEROSIS  11/15/2022    IR DRAINAGE TUBE CHECK WITH SCLEROSIS  11/25/2022    IR DRAINAGE TUBE PLACEMENT  09/19/2022    JOINT REPLACEMENT Right 02/02/2021    knee    KNEE SURGERY Right     meniscus tear    LYMPH NODE BIOPSY Right 07/29/2022    Procedure: BIOPSY LYMPH NODE SENTINEL;  Surgeon: Tremayne العراقي MD;  [facility];  Service: Surgical Oncology    MOHS SURGERY Right 12/20/2022    Right dorsal hand SCCIS-Dr Layton Morning    [place] ARTHRP KNE CONDYLE&PLATU MEDIAL&LAT COMPARTMENTS Right 02/02/2021    Procedure: ARTHROPLASTY KNEE TOTAL;  Surgeon: Candace Olguin MD;  [facility];  Service: Orthopedics    [place] ARTHRP [facility] CONDYLE&PLATU MEDIAL&LAT COMPARTMENTS Left 11/17/2021    Procedure: TOTAL KNEE REPLACEMENT;  Surgeon: Hayden Vargas MD;  [facility];  Service: Orthopedics    [place] LAPS SURG CHOLECYSTECTOMY Rhonda Blaazael N/A 12/23/2017    Procedure: CHOLECYSTECTOMY LAPAROSCOPIC with cholangiogram;  Surgeon: Amalia Roach MD;  [facility];  Service: General    MO SPLENECTOMY TOTAL SEPARATE PROCEDURE N/A 05/18/2017    Procedure: LAPAROSCOPIC HAND ASSIST SPLENECTOMY;  Surgeon: Madi Ray MD;  [facility];  Service: Surgical Oncology    SHOULDER SURGERY Left     rotator cuff x4, reconstruction    SKIN BIOPSY  5 May 2022    SKIN CANCER EXCISION  29 July 2022    SKIN LESION EXCISION Right 07/29/2022    Procedure: WIDE EXCISION RIGHT MEDIAL THIGH;  Surgeon: Harjeet Andrade MD;  [facility];  Service: Surgical Oncology        04/12/23 1410   PT Last Visit   PT Visit Date 04/12/23   Note Type   Note type Evaluation   Pain Assessment   Pain Score No Pain   Restrictions/Precautions   Weight Bearing Precautions Per Order No   Other Precautions Multiple lines;Contact/isolation; Fall Risk  (chemo prec)   Home Living   Type of [place] [id number] to live on main level with bedroom/bathroom; Two level  (0 SIDRA thru garage, then 4 + 10 steps to main level living )   Bathroom Shower/Tub Tub/shower unit   Bathroom Toilet Standard   Bathroom Equipment Grab bars in shower; Shower chair   Bathroom Accessibility Accessible   Home Equipment Walker;Cane  (not using PTA)   Additional Comments resides with spouse in multistory home  Wife retired and available  Pt still drives  Prior Function   Level of [place] Independent with ADLs; Independent with functional mobility; Independent with IADLS   Lives With Spouse   Receives Help From Family   IADLs Independent with driving; Independent with meal prep; Independent with medication management   Falls in the last 6 months 0   Vocational Retired   Comments Independent prior to admission without AD use  General   Additional Pertinent History Pt is 69 y/o male admitted with weakenss associated with diarrhea   + dehydration, leukocytosis, acute diverticulitis  R/O Cdiff  PT consulted  Up and OOB as tolerated orders  Family/Caregiver Present No   Cognition   Overall Cognitive Status WFL   Arousal/Participation Alert   Following Commands Follows all commands and directions without difficulty   Comments Pleasant  Subjective   Subjective Looking forward to getting out of here and to home  RUE Assessment   RUE Assessment WFL   LUE Assessment   LUE Assessment WFL   RLE Assessment   RLE Assessment WFL   Strength RLE   RLE Overall Strength 4-/5   LLE Assessment   LLE Assessment WFL   Strength LLE   LLE Overall Strength 4-/5   Bed Mobility   Supine to Sit 5  Supervision   Additional items Increased time required; Bedrails;HOB elevated   Additional Comments Increased time to complete  Transfers   Sit to Stand 5  Supervision   Additional items Increased time required  (wide stance)   Stand to Sit 5  Supervision   Additional items Increased time required   Stand pivot 5  Supervision   Ambulation/Elevation   Gait pattern Decreased foot clearance; Improper Weight shift;Decreased R stance; Inconsistent svetlana; Short stride   Gait Assistance 5  Supervision   Additional items Verbal cues   Assistive Device None  (offered cane v RW declining at this time  reports having both if needed for home )   Distance 150'x1, stair negotiateion, then additional 200 ft x 1  Stair Management Assistance 5  Supervision   Stair Management Technique Two rails; Alternating pattern   Number of Stairs 5   Balance   Static Sitting Fair +   Dynamic Sitting Fair   Static Standing Fair +   Dynamic Standing Fair   Ambulatory Fair   Activity Tolerance   Activity Tolerance Patient tolerated treatment well   Medical Staff Made Aware NurseChaz    OT-Ludmila:Pt seen for co-evaluation/treatment with skilled Occupational Therapist 2* clinically unstable/unpredictable presentation, medical complexity, fall risk, possible functional/physical limitations, impaired functional balance, limited activity tolerance which is decline from PLOF and may impact overall functional mobility/mobility safety  Nurse Made Aware yes   Assessment   Prognosis Good   Problem List Decreased strength;Decreased endurance; Impaired balance;Decreased mobility   Assessment Sheela Arizmendi is a 70 y o  male with a PMH of malignant melanoma, mets to brain, completed radiation therapy, currently on chemotherapy, history of hemolytic anemia was treated with rituximab, history of pulmonary embolism, portal vein thrombosis currently on Xarelto, hypertension, who presents with complaining of generalized weakness associated with diarrhea, poor appetite for last 2 weeks  Admitted with dehydration, leukocytosis, acute diverticulitis  PT consulted  Up and OOB as tolerated  Prior to admission resides with spouse in 2 story home with all needs on upper level with 4+ 10 steps to access  Independent prior to admission without AD use, driving  Has RW and cane if needed  Currently presents with mild functional limitations related to hospitalization with decreased activity tolerance, balance, general strength, functional mobility and locomotion compared to baseline  Increased time for mobility but completes at S level  Gait with decreased RLE stance time, decreased foot clearance and step length  Increased lateral sway observed at times  No overt LOB  Over distances improved pace, step length  Able to negotiate stairs reciprocally with S  Declines trial of cane or walker to optimize stability and notes having at home prn  At this time no further skilled PT needs identified  Can continue to ambulate restoratively with staff to prevent functional decline  The patient's AM-PAC Basic Mobility Inpatient Short Form Raw Score is 22   A Raw score of greater than or equal to 16 suggests the patient may benefit from discharge to home  Please also refer to the recommendation of the Physical Therapist for safe discharge planning  Anticipate safe d/c to home with family support when medically stable  Barriers to Discharge Inaccessible home environment   Goals   Patient Goals go home   Plan   Treatment/Interventions Functional transfer training;LE strengthening/ROM; Elevations; Therapeutic exercise; Endurance training;Patient/family training;Equipment eval/education; Bed mobility;Gait training; Compensatory technique education;Continued evaluation;Spoke to nursing;OT   PT Frequency Other (Comment)  (d/c PT)   Recommendation   PT Discharge Recommendation No rehabilitation needs   Equipment Recommended   (has RW, cane)   AM-PAC Basic Mobility Inpatient   Turning in Flat Bed Without Bedrails 3   Lying on Back to Sitting on Edge of Flat Bed Without Bedrails 3   Moving Bed to Chair 4   Standing Up From Chair Using Arms 4   Walk in Room 4   Climb 3-5 Stairs With Railing 4   Basic Mobility Inpatient Raw Score 22   Basic Mobility Standardized Score 47 4   Highest Level Of Mobility   JH-HLM Goal 7: Walk 25 feet or more   JH-HLM Achieved 8: Walk 250 feet ot more   End of Consult   Patient Position at End of Consult Bedside chair; All needs within reach     Hx/personal factors: co-morbidities, inaccessible home, mutliple lines, and fall risk  Examination: dec mobility, dec balance, dec endurance, dec amb, risk for falls, assessed body system, balance, endurance, amb, D/C disposition & fall risk  Clinical: unpredictable (ongoing medical status, abnormal lab values, risk for falls, and consult pending)  Complexity: high           Phyllis Cast, PT

## 2023-04-12 NOTE — PLAN OF CARE
Problem: Potential for Falls  Goal: Patient will remain free of falls  Description: INTERVENTIONS:  - Educate patient/family on patient safety including physical limitations  - Instruct patient to call for assistance with activity   - Consult OT/PT to assist with strengthening/mobility   - Keep Call bell within reach  - Keep bed low and locked with side rails adjusted as appropriate  - Keep care items and personal belongings within reach  - Initiate and maintain comfort rounds  - Make Fall Risk Sign visible to staff  - Offer Toileting every 2 Hours, in advance of need  - Initiate/Maintain bed alarm  - Obtain necessary fall risk management equipment: call bell, bed alarm  - Apply yellow socks and bracelet for high fall risk patients  - Consider moving patient to room near nurses station  Outcome: Progressing     Problem: GASTROINTESTINAL - ADULT  Goal: Minimal or absence of nausea and/or vomiting  Description: INTERVENTIONS:  - Administer IV fluids if ordered to ensure adequate hydration  - Maintain NPO status until nausea and vomiting are resolved  - Nasogastric tube if ordered  - Administer ordered antiemetic medications as needed  - Provide nonpharmacologic comfort measures as appropriate  - Advance diet as tolerated, if ordered  - Consider nutrition services referral to assist patient with adequate nutrition and appropriate food choices  Outcome: Progressing  Goal: Maintains or returns to baseline bowel function  Description: INTERVENTIONS:  - Assess bowel function  - Encourage oral fluids to ensure adequate hydration  - Administer IV fluids if ordered to ensure adequate hydration  - Administer ordered medications as needed  - Encourage mobilization and activity  - Consider nutritional services referral to assist patient with adequate nutrition and appropriate food choices  Outcome: Progressing  Goal: Oral mucous membranes remain intact  Description: INTERVENTIONS  - Assess oral mucosa and hygiene practices  - Implement preventative oral hygiene regimen  - Implement oral medicated treatments as ordered  - Initiate Nutrition services referral as needed  Outcome: Progressing     Problem: PAIN - ADULT  Goal: Verbalizes/displays adequate comfort level or baseline comfort level  Description: Interventions:  - Encourage patient to monitor pain and request assistance  - Assess pain using appropriate pain scale  - Administer analgesics based on type and severity of pain and evaluate response  - Implement non-pharmacological measures as appropriate and evaluate response  - Consider cultural and social influences on pain and pain management  - Notify physician/advanced practitioner if interventions unsuccessful or patient reports new pain  Outcome: Progressing     Problem: INFECTION - ADULT  Goal: Absence or prevention of progression during hospitalization  Description: INTERVENTIONS:  - Assess and monitor for signs and symptoms of infection  - Monitor lab/diagnostic results  - Monitor all insertion sites, i e  indwelling lines, tubes, and drains  - Monitor endotracheal if appropriate and nasal secretions for changes in amount and color  - [place] appropriate cooling/warming therapies per order  - Administer medications as ordered  - Instruct and encourage patient and family to use good hand hygiene technique  - Identify and instruct in appropriate isolation precautions for identified infection/condition  Outcome: Progressing     Problem: SAFETY ADULT  Goal: Patient will remain free of falls  Description: INTERVENTIONS:  - Educate patient/family on patient safety including physical limitations  - Instruct patient to call for assistance with activity   - Consult OT/PT to assist with strengthening/mobility   - Keep Call bell within reach  - Keep bed low and locked with side rails adjusted as appropriate  - Keep care items and personal belongings within reach  - Initiate and maintain comfort rounds  - Make Fall Risk Sign visible to staff  - Offer Toileting every 2 Hours, in advance of need  - Initiate/Maintain bed alarm  - Obtain necessary fall risk management equipment: call bell, bed alarm  - Apply yellow socks and bracelet for high fall risk patients  - Consider moving patient to room near nurses station  Outcome: Progressing  Goal: Maintain or return to baseline ADL function  Description: INTERVENTIONS:  -  Assess patient's ability to carry out ADLs; assess patient's baseline for ADL function and identify physical deficits which impact ability to perform ADLs (bathing, care of mouth/teeth, toileting, grooming, dressing, etc )  - Assess/evaluate cause of self-care deficits   - Assess range of motion  - Assess patient's mobility; develop plan if impaired  - Assess patient's need for assistive devices and provide as appropriate  - Encourage maximum independence but intervene and supervise when necessary  - Involve family in performance of ADLs  - Assess for home care needs following discharge   - Consider OT consult to assist with ADL evaluation and planning for discharge  - Provide patient education as appropriate  Outcome: Progressing  Goal: Maintains/Returns to pre admission functional level  Description: INTERVENTIONS:  - Perform BMAT or MOVE assessment daily    - Set and communicate daily mobility goal to care team and patient/family/caregiver  - Collaborate with rehabilitation services on mobility goals if consulted  - Perform Range of Motion 3 times a day  - Reposition patient every 2 hours    - Dangle patient 3 times a day  - Stand patient 3 times a day  - Ambulate patient 3 times a day  - Out of bed to chair 3 times a day   - Out of bed for meals 3 times a day  - Out of bed for toileting  - Record patient progress and toleration of activity level   Outcome: Progressing     Problem: DISCHARGE PLANNING  Goal: Discharge to home or other facility with appropriate resources  Description: INTERVENTIONS:  - Identify barriers to discharge w/patient and caregiver  - Arrange for needed discharge resources and transportation as appropriate  - Identify discharge learning needs (meds, wound care, etc )  - Arrange for interpretive services to assist at discharge as needed  - Refer to Case Management Department for coordinating discharge planning if the patient needs post-hospital services based on physician/advanced practitioner order or complex needs related to functional status, cognitive ability, or social support system  Outcome: Progressing     Problem: Knowledge Deficit  Goal: Patient/family/caregiver demonstrates understanding of disease process, treatment plan, medications, and discharge instructions  Description: Complete learning assessment and assess knowledge base    Interventions:  - Provide teaching at level of understanding  - Provide teaching via preferred learning methods  Outcome: Progressing

## 2023-04-12 NOTE — PLAN OF CARE
Problem: PHYSICAL THERAPY ADULT  Goal: Performs mobility at highest level of function for planned discharge setting  See evaluation for individualized goals  Description: Treatment/Interventions: Functional transfer training, LE strengthening/ROM, Elevations, Therapeutic exercise, Endurance training, Patient/family training, Equipment eval/education, Bed mobility, Gait training, Compensatory technique education, Continued evaluation, Spoke to nursing, OT  Equipment Recommended:  (has RW, cane)       See flowsheet documentation for full assessment, interventions and recommendations  Outcome: Completed  Note: Prognosis: Good  Problem List: Decreased strength, Decreased endurance, Impaired balance, Decreased mobility  Assessment: Len Payne is a 70 y o  male with a PMH of malignant melanoma, mets to brain, completed radiation therapy, currently on chemotherapy, history of hemolytic anemia was treated with rituximab, history of pulmonary embolism, portal vein thrombosis currently on Xarelto, hypertension, who presents with complaining of generalized weakness associated with diarrhea, poor appetite for last 2 weeks  Admitted with dehydration, leukocytosis, acute diverticulitis  PT consulted  Up and OOB as tolerated  Prior to admission resides with spouse in 2 story home with all needs on upper level with 4+ 10 steps to access  Independent prior to admission without AD use, driving  Has RW and cane if needed  Currently presents with mild functional limitations related to hospitalization with decreased activity tolerance, balance, general strength, functional mobility and locomotion compared to baseline  Increased time for mobility but completes at S level  Gait with decreased RLE stance time, decreased foot clearance and step length  Increased lateral sway observed at times  No overt LOB  Over distances improved pace, step length  Able to negotiate stairs reciprocally with S    Declines trial of cane or walker to optimize stability and notes having at home prn  At this time no further skilled PT needs identified  Can continue to ambulate restoratively with staff to prevent functional decline  The patient's AM-PAC Basic Mobility Inpatient Short Form Raw Score is 22  A Raw score of greater than or equal to 16 suggests the patient may benefit from discharge to home  Please also refer to the recommendation of the Physical Therapist for safe discharge planning  Anticipate safe d/c to home with family support when medically stable  Barriers to Discharge: Inaccessible home environment     PT Discharge Recommendation: No rehabilitation needs    See flowsheet documentation for full assessment

## 2023-04-12 NOTE — ASSESSMENT & PLAN NOTE
Results from last 7 days   Lab Units 04/12/23  0536 04/11/23  1616 04/06/23  1038   HEMOGLOBIN g/dL 10 9* 13 4 14 2   Baseline appearing to be around 12-14  Suspect drop in the setting of recent IV fluid dilution  We will continue to monitor in the setting of acute diverticulitis  Unfortunately place steroids on hold at this time

## 2023-04-12 NOTE — PLAN OF CARE
Problem: Potential for Falls  Goal: Patient will remain free of falls  Description: INTERVENTIONS:  - Educate patient/family on patient safety including physical limitations  - Instruct patient to call for assistance with activity   - Consult OT/PT to assist with strengthening/mobility   - Keep Call bell within reach  - Keep bed low and locked with side rails adjusted as appropriate  - Keep care items and personal belongings within reach  - Initiate and maintain comfort rounds  - Make Fall Risk Sign visible to staff  - Offer Toileting every 2 Hours, in advance of need  - Initiate/Maintain bed alarm  - Obtain necessary fall risk management equipment  - Apply yellow socks and bracelet for high fall risk patients  - Consider moving patient to room near nurses station  Outcome: Progressing     Problem: GASTROINTESTINAL - ADULT  Goal: Minimal or absence of nausea and/or vomiting  Description: INTERVENTIONS:  - Administer IV fluids if ordered to ensure adequate hydration  - Maintain NPO status until nausea and vomiting are resolved  - Nasogastric tube if ordered  - Administer ordered antiemetic medications as needed  - Provide nonpharmacologic comfort measures as appropriate  - Advance diet as tolerated, if ordered  - Consider nutrition services referral to assist patient with adequate nutrition and appropriate food choices  Outcome: Progressing  Goal: Maintains or returns to baseline bowel function  Description: INTERVENTIONS:  - Assess bowel function  - Encourage oral fluids to ensure adequate hydration  - Administer IV fluids if ordered to ensure adequate hydration  - Administer ordered medications as needed  - Encourage mobilization and activity  - Consider nutritional services referral to assist patient with adequate nutrition and appropriate food choices  Outcome: Progressing  Goal: Oral mucous membranes remain intact  Description: INTERVENTIONS  - Assess oral mucosa and hygiene practices  - Implement preventative oral hygiene regimen  - Implement oral medicated treatments as ordered  - Initiate Nutrition services referral as needed  Outcome: Progressing

## 2023-04-13 LAB
ANION GAP SERPL CALCULATED.3IONS-SCNC: 6 MMOL/L (ref 4–13)
BASOPHILS # BLD MANUAL: 0 THOUSAND/UL (ref 0–0.1)
BASOPHILS NFR MAR MANUAL: 0 % (ref 0–1)
BUN SERPL-MCNC: 22 MG/DL (ref 5–25)
C DIFF TOX GENS STL QL NAA+PROBE: NEGATIVE
CALCIUM SERPL-MCNC: 8.6 MG/DL (ref 8.4–10.2)
CAMPYLOBACTER DNA SPEC NAA+PROBE: NORMAL
CHLORIDE SERPL-SCNC: 110 MMOL/L (ref 96–108)
CO2 SERPL-SCNC: 28 MMOL/L (ref 21–32)
CREAT SERPL-MCNC: 0.96 MG/DL (ref 0.6–1.3)
EOSINOPHIL # BLD MANUAL: 0 THOUSAND/UL (ref 0–0.4)
EOSINOPHIL NFR BLD MANUAL: 0 % (ref 0–6)
ERYTHROCYTE [DISTWIDTH] IN BLOOD BY AUTOMATED COUNT: 13.4 % (ref 11.6–15.1)
GFR SERPL CREATININE-BSD FRML MDRD: 80 ML/MIN/1.73SQ M
GLUCOSE SERPL-MCNC: 108 MG/DL (ref 65–140)
HCT VFR BLD AUTO: 32.5 % (ref 36.5–49.3)
HGB BLD-MCNC: 10.3 G/DL (ref 12–17)
LYMPHOCYTES # BLD AUTO: 1.13 THOUSAND/UL (ref 0.6–4.47)
LYMPHOCYTES # BLD AUTO: 10 % (ref 14–44)
MACROCYTES BLD QL AUTO: PRESENT
MCH RBC QN AUTO: 36.5 PG (ref 26.8–34.3)
MCHC RBC AUTO-ENTMCNC: 31.7 G/DL (ref 31.4–37.4)
MCV RBC AUTO: 115 FL (ref 82–98)
MONOCYTES # BLD AUTO: 0.11 THOUSAND/UL (ref 0–1.22)
MONOCYTES NFR BLD: 1 % (ref 4–12)
NEUTROPHILS # BLD MANUAL: 10.01 THOUSAND/UL (ref 1.85–7.62)
NEUTS SEG NFR BLD AUTO: 89 % (ref 43–75)
PLATELET # BLD AUTO: 305 THOUSANDS/UL (ref 149–390)
PLATELET BLD QL SMEAR: ADEQUATE
PMV BLD AUTO: 10.2 FL (ref 8.9–12.7)
POTASSIUM SERPL-SCNC: 3.4 MMOL/L (ref 3.5–5.3)
RBC # BLD AUTO: 2.82 MILLION/UL (ref 3.88–5.62)
SALMONELLA DNA SPEC QL NAA+PROBE: NORMAL
SHIGA TOXIN STX GENE SPEC NAA+PROBE: NORMAL
SHIGELLA DNA SPEC QL NAA+PROBE: NORMAL
SODIUM SERPL-SCNC: 144 MMOL/L (ref 135–147)
WBC # BLD AUTO: 11.25 THOUSAND/UL (ref 4.31–10.16)

## 2023-04-13 RX ORDER — DEXAMETHASONE 4 MG/1
4 TABLET ORAL EVERY 6 HOURS SCHEDULED
Status: DISCONTINUED | OUTPATIENT
Start: 2023-04-13 | End: 2023-04-14 | Stop reason: HOSPADM

## 2023-04-13 RX ORDER — SACCHAROMYCES BOULARDII 250 MG
250 CAPSULE ORAL 2 TIMES DAILY
Status: DISCONTINUED | OUTPATIENT
Start: 2023-04-13 | End: 2023-04-14 | Stop reason: HOSPADM

## 2023-04-13 RX ORDER — SODIUM CHLORIDE, SODIUM LACTATE, POTASSIUM CHLORIDE, CALCIUM CHLORIDE 600; 310; 30; 20 MG/100ML; MG/100ML; MG/100ML; MG/100ML
100 INJECTION, SOLUTION INTRAVENOUS CONTINUOUS
Status: DISPENSED | OUTPATIENT
Start: 2023-04-13 | End: 2023-04-13

## 2023-04-13 RX ADMIN — BENZONATATE 100 MG: 100 CAPSULE ORAL at 15:53

## 2023-04-13 RX ADMIN — METRONIDAZOLE 500 MG: 500 TABLET ORAL at 15:49

## 2023-04-13 RX ADMIN — METOPROLOL SUCCINATE 12.5 MG: 25 TABLET, EXTENDED RELEASE ORAL at 09:23

## 2023-04-13 RX ADMIN — CEFEPIME HYDROCHLORIDE 1000 MG: 1 INJECTION, SOLUTION INTRAVENOUS at 13:14

## 2023-04-13 RX ADMIN — DEXAMETHASONE 4 MG: 4 TABLET ORAL at 18:27

## 2023-04-13 RX ADMIN — Medication 250 MG: at 15:49

## 2023-04-13 RX ADMIN — ACETAMINOPHEN 325MG 650 MG: 325 TABLET ORAL at 15:52

## 2023-04-13 RX ADMIN — PRAZOSIN HYDROCHLORIDE 1 MG: 1 CAPSULE ORAL at 21:27

## 2023-04-13 RX ADMIN — CEFEPIME HYDROCHLORIDE 1000 MG: 1 INJECTION, SOLUTION INTRAVENOUS at 00:35

## 2023-04-13 RX ADMIN — BENZONATATE 100 MG: 100 CAPSULE ORAL at 09:23

## 2023-04-13 RX ADMIN — ALPRAZOLAM 0.5 MG: 0.5 TABLET ORAL at 09:22

## 2023-04-13 RX ADMIN — METRONIDAZOLE 500 MG: 500 TABLET ORAL at 21:29

## 2023-04-13 RX ADMIN — MEMANTINE 10 MG: 5 TABLET ORAL at 09:23

## 2023-04-13 RX ADMIN — PANTOPRAZOLE SODIUM 40 MG: 40 TABLET, DELAYED RELEASE ORAL at 05:25

## 2023-04-13 RX ADMIN — POTASSIUM CHLORIDE 20 MEQ: 1500 TABLET, EXTENDED RELEASE ORAL at 09:24

## 2023-04-13 RX ADMIN — DEXAMETHASONE 4 MG: 4 TABLET ORAL at 15:49

## 2023-04-13 RX ADMIN — SODIUM CHLORIDE, SODIUM LACTATE, POTASSIUM CHLORIDE, AND CALCIUM CHLORIDE 100 ML/HR: .6; .31; .03; .02 INJECTION, SOLUTION INTRAVENOUS at 05:23

## 2023-04-13 RX ADMIN — METRONIDAZOLE 500 MG: 500 TABLET ORAL at 05:25

## 2023-04-13 RX ADMIN — DABIGATRAN ETEXILATE MESYLATE 150 MG: 150 CAPSULE ORAL at 09:23

## 2023-04-13 RX ADMIN — ALPRAZOLAM 0.5 MG: 0.5 TABLET ORAL at 21:26

## 2023-04-13 RX ADMIN — BENZONATATE 100 MG: 100 CAPSULE ORAL at 21:29

## 2023-04-13 RX ADMIN — MEMANTINE 5 MG: 5 TABLET ORAL at 18:29

## 2023-04-13 RX ADMIN — DABIGATRAN ETEXILATE MESYLATE 150 MG: 150 CAPSULE ORAL at 21:28

## 2023-04-13 RX ADMIN — SODIUM CHLORIDE, SODIUM LACTATE, POTASSIUM CHLORIDE, AND CALCIUM CHLORIDE 100 ML/HR: .6; .31; .03; .02 INJECTION, SOLUTION INTRAVENOUS at 15:00

## 2023-04-13 NOTE — PLAN OF CARE
Problem: Potential for Falls  Goal: Patient will remain free of falls  Description: INTERVENTIONS:  - Educate patient/family on patient safety including physical limitations  - Instruct patient to call for assistance with activity   - Consult OT/PT to assist with strengthening/mobility   - Keep Call bell within reach  - Keep bed low and locked with side rails adjusted as appropriate  - Keep care items and personal belongings within reach  - Initiate and maintain comfort rounds  - Make Fall Risk Sign visible to staff  - Offer Toileting every 2 Hours, in advance of need  - Initiate/Maintain bed alarm  - Obtain necessary fall risk management equipment  - Apply yellow socks and bracelet for high fall risk patients  - Consider moving patient to room near nurses station  Outcome: Progressing     Problem: GASTROINTESTINAL - ADULT  Goal: Minimal or absence of nausea and/or vomiting  Description: INTERVENTIONS:  - Administer IV fluids if ordered to ensure adequate hydration  - Maintain NPO status until nausea and vomiting are resolved  - Nasogastric tube if ordered  - Administer ordered antiemetic medications as needed  - Provide nonpharmacologic comfort measures as appropriate  - Advance diet as tolerated, if ordered  - Consider nutrition services referral to assist patient with adequate nutrition and appropriate food choices  Outcome: Progressing  Goal: Maintains or returns to baseline bowel function  Description: INTERVENTIONS:  - Assess bowel function  - Encourage oral fluids to ensure adequate hydration  - Administer IV fluids if ordered to ensure adequate hydration  - Administer ordered medications as needed  - Encourage mobilization and activity  - Consider nutritional services referral to assist patient with adequate nutrition and appropriate food choices  Outcome: Progressing  Goal: Oral mucous membranes remain intact  Description: INTERVENTIONS  - Assess oral mucosa and hygiene practices  - Implement preventative oral hygiene regimen  - Implement oral medicated treatments as ordered  - Initiate Nutrition services referral as needed  Outcome: Progressing     Problem: PAIN - ADULT  Goal: Verbalizes/displays adequate comfort level or baseline comfort level  Description: Interventions:  - Encourage patient to monitor pain and request assistance  - Assess pain using appropriate pain scale  - Administer analgesics based on type and severity of pain and evaluate response  - Implement non-pharmacological measures as appropriate and evaluate response  - Consider cultural and social influences on pain and pain management  - Notify physician/advanced practitioner if interventions unsuccessful or patient reports new pain  Outcome: Progressing     Problem: INFECTION - ADULT  Goal: Absence or prevention of progression during hospitalization  Description: INTERVENTIONS:  - Assess and monitor for signs and symptoms of infection  - Monitor lab/diagnostic results  - Monitor all insertion sites, i e  indwelling lines, tubes, and drains  - Monitor endotracheal if appropriate and nasal secretions for changes in amount and color  - [place] appropriate cooling/warming therapies per order  - Administer medications as ordered  - Instruct and encourage patient and family to use good hand hygiene technique  - Identify and instruct in appropriate isolation precautions for identified infection/condition  Outcome: Progressing     Problem: SAFETY ADULT  Goal: Patient will remain free of falls  Description: INTERVENTIONS:  - Educate patient/family on patient safety including physical limitations  - Instruct patient to call for assistance with activity   - Consult OT/PT to assist with strengthening/mobility   - Keep Call bell within reach  - Keep bed low and locked with side rails adjusted as appropriate  - Keep care items and personal belongings within reach  - Initiate and maintain comfort rounds  - Make Fall Risk Sign visible to staff  - Offer Toileting every 2 Hours, in advance of need  - Initiate/Maintain bed alarm  - Obtain necessary fall risk management equipment  - Apply yellow socks and bracelet for high fall risk patients  - Consider moving patient to room near nurses station  Outcome: Progressing  Goal: Maintain or return to baseline ADL function  Description: INTERVENTIONS:  -  Assess patient's ability to carry out ADLs; assess patient's baseline for ADL function and identify physical deficits which impact ability to perform ADLs (bathing, care of mouth/teeth, toileting, grooming, dressing, etc )  - Assess/evaluate cause of self-care deficits   - Assess range of motion  - Assess patient's mobility; develop plan if impaired  - Assess patient's need for assistive devices and provide as appropriate  - Encourage maximum independence but intervene and supervise when necessary  - Involve family in performance of ADLs  - Assess for home care needs following discharge   - Consider OT consult to assist with ADL evaluation and planning for discharge  - Provide patient education as appropriate  Outcome: Progressing  Goal: Maintains/Returns to pre admission functional level  Description: INTERVENTIONS:  - Perform BMAT or MOVE assessment daily    - Set and communicate daily mobility goal to care team and patient/family/caregiver  - Collaborate with rehabilitation services on mobility goals if consulted  - Perform Range of Motion 3 times a day  - Reposition patient every 2 hours    - Dangle patient 3 times a day  - Stand patient 3 times a day  - Ambulate patient 3 times a day  - Out of bed to chair 3 times a day   - Out of bed for meals 3 times a day  - Out of bed for toileting  - Record patient progress and toleration of activity level   Outcome: Progressing     Problem: DISCHARGE PLANNING  Goal: Discharge to home or other facility with appropriate resources  Description: INTERVENTIONS:  - Identify barriers to discharge w/patient and caregiver  - Arrange for needed discharge resources and transportation as appropriate  - Identify discharge learning needs (meds, wound care, etc )  - Arrange for interpretive services to assist at discharge as needed  - Refer to Case Management Department for coordinating discharge planning if the patient needs post-hospital services based on physician/advanced practitioner order or complex needs related to functional status, cognitive ability, or social support system  Outcome: Progressing     Problem: Knowledge Deficit  Goal: Patient/family/caregiver demonstrates understanding of disease process, treatment plan, medications, and discharge instructions  Description: Complete learning assessment and assess knowledge base    Interventions:  - Provide teaching at level of understanding  - Provide teaching via preferred learning methods  Outcome: Progressing

## 2023-04-13 NOTE — ASSESSMENT & PLAN NOTE
Noted with chronic leukocytosis  Chronically on steroids as an outpatient  Likely multifactorial in settings of being on steroids and malignant melanoma    And now with new found acute diverticulitis  Antibiotics started as above

## 2023-04-13 NOTE — ASSESSMENT & PLAN NOTE
Results from last 7 days   Lab Units 04/13/23  1022 04/12/23  0536 04/11/23  1616   HEMOGLOBIN g/dL 10 3* 10 9* 13 4   Baseline appearing to be around 12-14  Per patient's wife, he gets weekly CBC draws for close monitoring with Dr Jessika Hermosillo  Suspect drop in the setting of recent IV fluid dilution and acute stress  Continued on maintenance steroids

## 2023-04-13 NOTE — ASSESSMENT & PLAN NOTE
Complaining of having watery diarrhea that has been ongoing for 2 weeks  Nonbloody  Abdomen is distended on exam, nontender  C  difficile pending & enteric panel pending    CT abdomen with acute diverticulitis- started on IV abx

## 2023-04-13 NOTE — ASSESSMENT & PLAN NOTE
58-year-old male patient with past medical history of malignant melanoma, metastasis to brain, currently undergoing whole brain radiation  Presented with complaining of diarrhea, generalized weakness, noted dehydrated on initial evaluation in ED  Noted with chronic leukocytosis  Noted with elevated BUN, anion gap, creatinine within normal limits  Patient reports poor p o  intake lately at home    Currently ill-appearing, acutely nontoxic-appearing  Continue with IV hydration    Fall precaution, PT OT evaluated-without needs  Palliative care consult placed on admission - deferred to outpatient evaluation  Continue IV antibiotics for acute diverticulitis noted on CT abdomen  If continues to improve, suspect transition to oral antibiotics tomorrow  C  difficile and enteric pathogen panel pending

## 2023-04-13 NOTE — ASSESSMENT & PLAN NOTE
Present on admission history of hypertension    Had some lower blood pressures on admission  Therefore held Lasix, hydrochlorothiazide, Toprol, prazosin  Blood pressures with rise to the 140s with IV fluids  Resumed Toprol and prazosin with hold parameters in place

## 2023-04-13 NOTE — PROGRESS NOTES
[address] [place] [address]  Progress Note  Name: Manjeet Martin  MRN: [id number]  Unit/Bed#: E2 -01 I Date of Admission: 4/11/2023   Date of Service: 4/13/2023 I Hospital Day: 1    Assessment/Plan   * Generalized weakness  Assessment & Plan  50-year-old male patient with past medical history of malignant melanoma, metastasis to brain, currently undergoing whole brain radiation  Presented with complaining of diarrhea, generalized weakness, noted dehydrated on initial evaluation in ED  Noted with chronic leukocytosis  Noted with elevated BUN, anion gap, creatinine within normal limits  Patient reports poor p o  intake lately at home    Currently ill-appearing, acutely nontoxic-appearing  Continue with IV hydration  Fall precaution, PT OT evaluated-without needs  Palliative care consult placed on admission - deferred to outpatient evaluation  Continue IV antibiotics for acute diverticulitis noted on CT abdomen  If continues to improve, suspect transition to oral antibiotics tomorrow  C  difficile and enteric pathogen panel pending    Acute diverticulitis  Assessment & Plan  Patient presented with 2 weeks of diarrhea, decreased appetite, generalized weakness  CT abdome: Mild acute diverticulitis of descending colon and proximal sigmoid colon  No bowel obstruction  Given immunocompromised state as well as tachycardia and leukocytosis we will proceed with IV antibiotics consisting of IV cefepime and metronidazole  If continues to improve, consider transition to oral antibiotics tomorrow  C  difficile and enteric pathogen panel pending    Watery diarrhea  Assessment & Plan  Complaining of having watery diarrhea that has been ongoing for 2 weeks  Nonbloody  Abdomen is distended on exam, nontender  C  difficile pending & enteric panel pending  CT abdomen with acute diverticulitis- started on IV abx    Leukocytosis  Assessment & Plan  Noted with chronic leukocytosis    Chronically on steroids as an outpatient  Likely multifactorial in settings of being on steroids and malignant melanoma  And now with new found acute diverticulitis  Antibiotics started as above    Personal history of malignant melanoma  Assessment & Plan  Present on admission history of malignant melanoma with brain metastasis  Currently undergoing whole brain radiation  Currently on p o  chemotherapy- hold in the setting of acute infection  Also maintained on chronic steroids for his hemolytic anemia  Previously attained on daily prednisone-recently switched to dexamethasone 4 mg every 6 hours in the setting of brain mets -confirmed with wife  Overall guarded prognosis  Palliative care consult placed -declined here -can followup in outpt setting  Continue outpatient follow-up with primary heme-onc  Messaged on call oncologist to make aware of current hospitalization and plan  Patient has oncology followup already scheduled for Monday 4/17/23    Essential hypertension  Assessment & Plan  Present on admission history of hypertension  Had some lower blood pressures on admission  Therefore held Lasix, hydrochlorothiazide, Toprol, prazosin  Blood pressures with rise to the 140s with IV fluids  Resumed Toprol and prazosin with hold parameters in place    Portal vein thrombosis  Assessment & Plan  History of pulmonary embolism, portal vein thrombosis 07/2022   Anticoagulated on Pradaxa    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  Assessment & Plan  Results from last 7 days   Lab Units 04/13/23  1022 04/12/23  0536 04/11/23  1616   HEMOGLOBIN g/dL 10 3* 10 9* 13 4   Baseline appearing to be around 12-14  Per patient's wife, he gets weekly CBC draws for close monitoring with Dr Jeanine Cantrell  Suspect drop in the setting of recent IV fluid dilution and acute stress  Continued on maintenance steroids          VTE Pharmacologic Prophylaxis:   Pharmacologic: Dabigatran (Pradaxa)  Mechanical VTE Prophylaxis in Place: Yes    Discharge Plan:  With need for continued inpatient stay for IV antibiotics, and pending stool cultures    Discussions with Specialists or Other Care Team Provider: Nursing    Education and Discussions with Family / Patient: Patient, wife via telephone    Time Spent for Care: 1 hour  More than 50% of total time spent on counseling and coordination of care as described above  Current Length of Stay: 1 day(s)  Current Patient Status: Inpatient   Code Status: Level 1 - Full Code    Subjective:   Seen and evaluated earlier during rounds  Patient resting in bed  Reports his frequent bowel movements of loose stool have subsided  He is now eating and drinking more regularly  Clarified outpatient steroids with wife via telephone  Patient was on chronic daily prednisone for his history of hemolytic anemia  This was recently stopped and he was started on dexamethasone 4 mg 6 hours for his brain mets  Objective:     Vitals:   Temp (24hrs), Av 5 °F (36 4 °C), Min:96 9 °F (36 1 °C), Max:98 1 °F (36 7 °C)    Temp:  [96 9 °F (36 1 °C)-98 1 °F (36 7 °C)] 96 9 °F (36 1 °C)  HR:  [66-73] 66  Resp:  [20] 20  BP: (142-146)/(83) 146/83  SpO2:  [95 %] 95 %  Body mass index is 29 65 kg/m²  Input and Output Summary (last 24 hours):     No intake or output data in the 24 hours ending 23 1414    Physical Exam:     Physical Exam  Vitals and nursing note reviewed  Constitutional:       General: He is not in acute distress  Appearance: He is obese  He is not ill-appearing, toxic-appearing or diaphoretic  HENT:      Head: Normocephalic and atraumatic  Eyes:      General: No scleral icterus  Cardiovascular:      Rate and Rhythm: Normal rate and regular rhythm  Pulmonary:      Effort: Pulmonary effort is normal  No respiratory distress  Breath sounds: Normal breath sounds  No stridor  No wheezing or rhonchi  Abdominal:      General: Bowel sounds are normal  There is no distension  Palpations: Abdomen is soft   There is no mass  Tenderness: There is no abdominal tenderness  Hernia: No hernia is present  Musculoskeletal:         General: No swelling  Cervical back: Neck supple  Skin:     General: Skin is warm and dry  Neurological:      Mental Status: He is alert and oriented to person, place, and time  Mental status is at baseline  Psychiatric:         Mood and Affect: Mood normal          Behavior: Behavior normal          Additional Data:     Labs:    Results from last 7 days   Lab Units 04/13/23  1022   WBC Thousand/uL 11 25*   HEMOGLOBIN g/dL 10 3*   HEMATOCRIT % 32 5*   PLATELETS Thousands/uL 305   LYMPHO PCT % 10*   MONO PCT % 1*   EOS PCT % 0     Results from last 7 days   Lab Units 04/13/23  1022 04/12/23  0536   POTASSIUM mmol/L 3 4* 3 5   CHLORIDE mmol/L 110* 106   CO2 mmol/L 28 27   BUN mg/dL 22 29*   CREATININE mg/dL 0 96 0 92   CALCIUM mg/dL 8 6 8 9   ALK PHOS U/L  --  62   ALT U/L  --  27   AST U/L  --  19           * I Have Reviewed All Lab Data Listed Above  * Additional Pertinent Lab Tests Reviewed:  [facility]    Imaging:    Imaging Reports Reviewed Today Include:   Imaging Personally Reviewed by Myself Includes:      Recent Cultures (last 7 days):       Last 24 Hours Medication List:   Current Facility-Administered Medications   Medication Dose Route Frequency Provider Last Rate   • acetaminophen  650 mg Oral Q6H PRN Luis Alberto JARQUIN MD     • ALPRAZolam  0 5 mg Oral BID Luis Alberto JARQUIN MD     • benzonatate  100 mg Oral TID Dianna JARQUIN MD     • cefepime  1,000 mg Intravenous Q12H Chrissy Mireles PA-C 1,000 mg (04/13/23 1314)   • dabigatran etexilate  150 mg Oral Q12H [address] Luis Alberto JARQUIN MD     • dexamethasone  4 mg Oral Q6H [address] Chrissy Mireles PA-C     • lactated ringers  100 mL/hr Intravenous Continuous Chrissy Mireles PA-C     • memantine  10 mg Oral Daily Parul Doe MD     • memantine  5 mg Oral After Richardson Cagle MD     • metoprolol succinate  12 5 mg Oral QAM Chrissy Mireles PA-C     • metroNIDAZOLE  500 mg Oral Q8H [facility] Chrissy Mireles PA-C     • ondansetron  4 mg Oral Q8H PRN Luis Alberto JARQUIN MD     • pantoprazole  40 mg Oral Early Morning Luis Alberto JARQUIN MD     • potassium chloride  20 mEq Oral QAM Luis Alberto JARQUIN MD     • prazosin  1 mg Oral HS Helena Ryan PA-C          Today, Patient Was Seen By: Helena Ryan PA-C    ** Please Note: This note has been constructed using a voice recognition system   **

## 2023-04-13 NOTE — ASSESSMENT & PLAN NOTE
Patient presented with 2 weeks of diarrhea, decreased appetite, generalized weakness  CT abdome: Mild acute diverticulitis of descending colon and proximal sigmoid colon  No bowel obstruction    Given immunocompromised state as well as tachycardia and leukocytosis we will proceed with IV antibiotics consisting of IV cefepime and metronidazole  If continues to improve, consider transition to oral antibiotics tomorrow  C  difficile and enteric pathogen panel pending

## 2023-04-13 NOTE — PLAN OF CARE
Problem: Potential for Falls  Goal: Patient will huseyin free of falls  Description: INTERVENTIONS:  - Educate patient/family on patient safety including physical limitations  - Instruct patient to call for assistance with activity   - Consult OT/PT to assist with strengthening/mobility   - Keep Call bell within reach  - Keep bed low and locked with side rails adjusted as appropriate  - Keep care items and personal belongings within reach  - Initiate and maintain comfort rounds  - Make Fall Risk Sign visible to staff  - Offer Toileting every 2 Hours, in advance of need  - Initiate/Maintain bed alarm  - Obtain necessary fall risk management equipment: call bell, bed alarm  - Apply yellow socks and bracelet for high fall risk patients  - Consider moving patient to room near nurses station  Outcome: Progressing     Problem: GASTROINTESTINAL - ADULT  Goal: Minimal or absence of nausea and/or vomiting  Description: INTERVENTIONS:  - Administer IV fluids if ordered to ensure adequate hydration  - Maintain NPO status until nausea and vomiting are resolved  - Nasogastric tube if ordered  - Administer ordered antiemetic medications as needed  - Provide nonpharmacologic comfort measures as appropriate  - Advance diet as tolerated, if ordered  - Consider nutrition services referral to assist patient with adequate nutrition and appropriate food choices  Outcome: Progressing     Problem: PAIN - ADULT  Goal: Verbalizes/displays adequate comfort level or baseline comfort level  Description: Interventions:  - Encourage patient to monitor pain and request assistance  - Assess pain using appropriate pain scale  - Administer analgesics based on type and severity of pain and evaluate response  - Implement non-pharmacological measures as appropriate and evaluate response  - Consider cultural and social influences on pain and pain management  - Notify physician/advanced practitioner if interventions unsuccessful or patient reports new pain  Outcome: Progressing     Problem: INFECTION - ADULT  Goal: Absence or prevention of progression during hospitalization  Description: INTERVENTIONS:  - Assess and monitor for signs and symptoms of infection  - Monitor lab/diagnostic results  - Monitor all insertion sites, i e  indwelling lines, tubes, and drains  - Monitor endotracheal if appropriate and nasal secretions for changes in amount and color  - [place] appropriate cooling/warming therapies per order  - Administer medications as ordered  - Instruct and encourage patient and family to use good hand hygiene technique  - Identify and instruct in appropriate isolation precautions for identified infection/condition  Outcome: Progressing        Problem: DISCHARGE PLANNING  Goal: Discharge to home or other facility with appropriate resources  Description: INTERVENTIONS:  - Identify barriers to discharge w/patient and caregiver  - Arrange for needed discharge resources and transportation as appropriate  - Identify discharge learning needs (meds, wound care, etc )  - Arrange for interpretive services to assist at discharge as needed  - Refer to Case Management Department for coordinating discharge planning if the patient needs post-hospital services based on physician/advanced practitioner order or complex needs related to functional status, cognitive ability, or social support system  Outcome: Progressing     Problem: Knowledge Deficit  Goal: Patient/family/caregiver demonstrates understanding of disease process, treatment plan, medications, and discharge instructions  Description: Complete learning assessment and assess knowledge base    Interventions:  - Provide teaching at level of understanding  - Provide teaching via preferred learning methods  Outcome: Progressing

## 2023-04-14 VITALS
HEART RATE: 73 BPM | BODY MASS INDEX: 29.65 KG/M2 | DIASTOLIC BLOOD PRESSURE: 65 MMHG | WEIGHT: 195 LBS | TEMPERATURE: 98.3 F | SYSTOLIC BLOOD PRESSURE: 141 MMHG | OXYGEN SATURATION: 95 % | RESPIRATION RATE: 20 BRPM

## 2023-04-14 LAB
ANION GAP SERPL CALCULATED.3IONS-SCNC: 7 MMOL/L (ref 4–13)
BUN SERPL-MCNC: 15 MG/DL (ref 5–25)
CALCIUM SERPL-MCNC: 8.3 MG/DL (ref 8.4–10.2)
CHLORIDE SERPL-SCNC: 109 MMOL/L (ref 96–108)
CO2 SERPL-SCNC: 27 MMOL/L (ref 21–32)
CREAT SERPL-MCNC: 0.85 MG/DL (ref 0.6–1.3)
ERYTHROCYTE [DISTWIDTH] IN BLOOD BY AUTOMATED COUNT: 13.5 % (ref 11.6–15.1)
GFR SERPL CREATININE-BSD FRML MDRD: 88 ML/MIN/1.73SQ M
GLUCOSE SERPL-MCNC: 172 MG/DL (ref 65–140)
HCT VFR BLD AUTO: 30.8 % (ref 36.5–49.3)
HGB BLD-MCNC: 10.3 G/DL (ref 12–17)
MCH RBC QN AUTO: 37.9 PG (ref 26.8–34.3)
MCHC RBC AUTO-ENTMCNC: 33.4 G/DL (ref 31.4–37.4)
MCV RBC AUTO: 113 FL (ref 82–98)
PLATELET # BLD AUTO: 352 THOUSANDS/UL (ref 149–390)
PMV BLD AUTO: 10.2 FL (ref 8.9–12.7)
POTASSIUM SERPL-SCNC: 3.7 MMOL/L (ref 3.5–5.3)
RBC # BLD AUTO: 2.72 MILLION/UL (ref 3.88–5.62)
SODIUM SERPL-SCNC: 143 MMOL/L (ref 135–147)
WBC # BLD AUTO: 10.76 THOUSAND/UL (ref 4.31–10.16)

## 2023-04-14 RX ORDER — CIPROFLOXACIN 500 MG/1
500 TABLET, FILM COATED ORAL EVERY 12 HOURS SCHEDULED
Qty: 14 TABLET | Refills: 0 | Status: SHIPPED | OUTPATIENT
Start: 2023-04-14 | End: 2023-04-25

## 2023-04-14 RX ORDER — SACCHAROMYCES BOULARDII 250 MG
250 CAPSULE ORAL 2 TIMES DAILY
Qty: 14 CAPSULE | Refills: 0 | Status: SHIPPED | OUTPATIENT
Start: 2023-04-14 | End: 2023-04-25

## 2023-04-14 RX ORDER — METRONIDAZOLE 500 MG/1
500 TABLET ORAL EVERY 8 HOURS SCHEDULED
Qty: 21 TABLET | Refills: 0 | Status: SHIPPED | OUTPATIENT
Start: 2023-04-14 | End: 2023-04-25

## 2023-04-14 RX ADMIN — METRONIDAZOLE 500 MG: 500 TABLET ORAL at 05:26

## 2023-04-14 RX ADMIN — DEXAMETHASONE 4 MG: 4 TABLET ORAL at 00:13

## 2023-04-14 RX ADMIN — ALPRAZOLAM 0.5 MG: 0.5 TABLET ORAL at 08:19

## 2023-04-14 RX ADMIN — BENZONATATE 100 MG: 100 CAPSULE ORAL at 08:19

## 2023-04-14 RX ADMIN — CEFEPIME HYDROCHLORIDE 1000 MG: 1 INJECTION, SOLUTION INTRAVENOUS at 12:26

## 2023-04-14 RX ADMIN — METOPROLOL SUCCINATE 12.5 MG: 25 TABLET, EXTENDED RELEASE ORAL at 08:18

## 2023-04-14 RX ADMIN — PANTOPRAZOLE SODIUM 40 MG: 40 TABLET, DELAYED RELEASE ORAL at 05:26

## 2023-04-14 RX ADMIN — DABIGATRAN ETEXILATE MESYLATE 150 MG: 150 CAPSULE ORAL at 08:19

## 2023-04-14 RX ADMIN — MEMANTINE 10 MG: 5 TABLET ORAL at 08:19

## 2023-04-14 RX ADMIN — Medication 250 MG: at 08:19

## 2023-04-14 RX ADMIN — DEXAMETHASONE 4 MG: 4 TABLET ORAL at 12:26

## 2023-04-14 RX ADMIN — POTASSIUM CHLORIDE 20 MEQ: 1500 TABLET, EXTENDED RELEASE ORAL at 08:19

## 2023-04-14 RX ADMIN — CEFEPIME HYDROCHLORIDE 1000 MG: 1 INJECTION, SOLUTION INTRAVENOUS at 00:14

## 2023-04-14 RX ADMIN — DEXAMETHASONE 4 MG: 4 TABLET ORAL at 05:26

## 2023-04-14 NOTE — ASSESSMENT & PLAN NOTE
Present on admission history of malignant melanoma with brain metastasis  Currently undergoing whole brain radiation  Currently on p o  chemotherapy-placed on hold in the setting of acute infection  Also maintained on chronic steroids for his hemolytic anemia  Previously maintained on daily prednisone-recently switched to dexamethasone 4 mg every 6 hours in the setting of brain mets -confirmed with wife  Overall guarded prognosis    Palliative care consult placed -declined here -can followup in outpt setting  Messaged on call oncologist to make aware of current hospitalization and plan  On going hematology/oncology outpatient [id number] has an appointment for Monday, 4/17/2023

## 2023-04-14 NOTE — PLAN OF CARE
Problem: Potential for Falls  Goal: Patient will remain free of falls  Description: INTERVENTIONS:  - Educate patient/family on patient safety including physical limitations  - Instruct patient to call for assistance with activity   - Consult OT/PT to assist with strengthening/mobility   - Keep Call bell within reach  - Keep bed low and locked with side rails adjusted as appropriate  - Keep care items and personal belongings within reach  - Initiate and maintain comfort rounds  - Make Fall Risk Sign visible to staff  - Offer Toileting every 2 Hours, in advance of need  - Initiate/Maintain bed alarm  - Obtain necessary fall risk management equipment: non slip socks, call bell  - Apply yellow socks and bracelet for high fall risk patients  - Consider moving patient to room near nurses station  Outcome: Progressing     Problem: GASTROINTESTINAL - ADULT  Goal: Minimal or absence of nausea and/or vomiting  Description: INTERVENTIONS:  - Administer IV fluids if ordered to ensure adequate hydration  - Maintain NPO status until nausea and vomiting are resolved  - Nasogastric tube if ordered  - Administer ordered antiemetic medications as needed  - Provide nonpharmacologic comfort measures as appropriate  - Advance diet as tolerated, if ordered  - Consider nutrition services referral to assist patient with adequate nutrition and appropriate food choices  Outcome: Progressing  Goal: Maintains or returns to baseline bowel function  Description: INTERVENTIONS:  - Assess bowel function  - Encourage oral fluids to ensure adequate hydration  - Administer IV fluids if ordered to ensure adequate hydration  - Administer ordered medications as needed  - Encourage mobilization and activity  - Consider nutritional services referral to assist patient with adequate nutrition and appropriate food choices  Outcome: Progressing  Goal: Oral mucous membranes remain intact  Description: INTERVENTIONS  - Assess oral mucosa and hygiene practices  - Implement preventative oral hygiene regimen  - Implement oral medicated treatments as ordered  - Initiate Nutrition services referral as needed  Outcome: Progressing

## 2023-04-14 NOTE — ASSESSMENT & PLAN NOTE
50-year-old male patient with past medical history of malignant melanoma, metastasis to brain, currently undergoing whole brain radiation  Presented with complaining of diarrhea, generalized weakness, noted dehydrated on initial evaluation in ED  Reported poor oral intake at home    Noted with chronic leukocytosis  Noted with elevated BUN but creatinine within normal limits  Currently ill-appearing, acutely nontoxic-appearing    Received IV fluid hydration with improvement  Fall precaution, PT OT evaluated-without needs  Palliative care consult placed on admission - deferred to outpatient evaluation  Found to have acute diverticulitis on CT imaging and treated with IV antibiotics  C  difficile and enteric pathogen panel negative

## 2023-04-14 NOTE — ASSESSMENT & PLAN NOTE
Results from last 7 days   Lab Units 04/14/23  0649 04/13/23  1022 04/12/23  0536 04/11/23  1616   HEMOGLOBIN g/dL 10 3* 10 3* 10 9* 13 4   Baseline appearing to be around 12-14  Per patient's wife, he gets weekly CBC draws for close monitoring with Dr Jessika Hermosillo  Suspect drop in the setting of recent IV fluid dilution and acute stress  Continued on maintenance steroids-Decadron 4 mg every 6 hours  Continue ongoing outpatient monitoring of hemoglobin

## 2023-04-14 NOTE — ASSESSMENT & PLAN NOTE
Present on admission history of hypertension    Had some lower blood pressures on admission  Therefore held Lasix, hydrochlorothiazide, Toprol, prazosin  Blood pressures with rise to the 140s with IV fluids  Resumed Toprol and prazosin while hospitalized  Continue oral diuretics upon discharge

## 2023-04-14 NOTE — ASSESSMENT & PLAN NOTE
Presented with complaint of having watery 9 bloody diarrhea that has been ongoing for 2 weeks  Abdomen was distended on exam, nontender    C  difficile pending & enteric panel negative  CT abdomen with acute diverticulitis- started on IV abx- transitioned to oral course

## 2023-04-14 NOTE — PLAN OF CARE
Problem: INFECTION - ADULT  Goal: Absence or prevention of progression during hospitalization  Description: INTERVENTIONS:  - Assess and monitor for signs and symptoms of infection  - Monitor lab/diagnostic results  - Monitor all insertion sites, i e  indwelling lines, tubes, and drains  - Monitor endotracheal if appropriate and nasal secretions for changes in amount and color  - [place] appropriate cooling/warming therapies per order  - Administer medications as ordered  - Instruct and encourage patient and family to use good hand hygiene technique  - Identify and instruct in appropriate isolation precautions for identified infection/condition  Outcome: Progressing     Problem: DISCHARGE PLANNING  Goal: Discharge to home or other facility with appropriate resources  Description: INTERVENTIONS:  - Identify barriers to discharge w/patient and caregiver  - Arrange for needed discharge resources and transportation as appropriate  - Identify discharge learning needs (meds, wound care, etc )  - Arrange for interpretive services to assist at discharge as needed  - Refer to Case Management Department for coordinating discharge planning if the patient needs post-hospital services based on physician/advanced practitioner order or complex needs related to functional status, cognitive ability, or social support system  Outcome: Progressing

## 2023-04-14 NOTE — ASSESSMENT & PLAN NOTE
Patient presented with 2 weeks of diarrhea, decreased appetite, generalized weakness  CT abdomen: Mild acute diverticulitis of descending colon and proximal sigmoid colon  No bowel obstruction    Given immunocompromised state as well as tachycardia and leukocytosis, treated with IV antibiotics consisting of IV cefepime and metronidazole  C  difficile and enteric pathogen panel negative  We will transition to oral antibiotics to complete a 10-day antibiotic course

## 2023-04-14 NOTE — DISCHARGE SUMMARY
[address] [facility]  Discharge- [facility] 1955, 70 y o  male MRN: [id number]  Unit/Bed#: E2 -01 Encounter: [id number]  Primary Care Provider: Lan Dugan DO   Date and time admitted to hospital: 4/11/2023  3:31 PM    * Generalized weakness  Assessment & Plan  13-year-old male patient with past medical history of malignant melanoma, metastasis to brain, currently undergoing whole brain radiation  Presented with complaining of diarrhea, generalized weakness, noted dehydrated on initial evaluation in ED  Reported poor oral intake at home    Noted with chronic leukocytosis  Noted with elevated BUN but creatinine within normal limits  Currently ill-appearing, acutely nontoxic-appearing  Received IV fluid hydration with improvement  Fall precaution, PT OT evaluated-without needs  Palliative care consult placed on admission - deferred to outpatient evaluation  Found to have acute diverticulitis on CT imaging and treated with IV antibiotics  C  difficile and enteric pathogen panel negative    Acute diverticulitis  Assessment & Plan  Patient presented with 2 weeks of diarrhea, decreased appetite, generalized weakness  CT abdomen: Mild acute diverticulitis of descending colon and proximal sigmoid colon  No bowel obstruction  Given immunocompromised state as well as tachycardia and leukocytosis, treated with IV antibiotics consisting of IV cefepime and metronidazole  C  difficile and enteric pathogen panel negative  We will transition to oral antibiotics to complete a 10-day antibiotic course    Watery diarrhea  Assessment & Plan  Presented with complaint of having watery 9 bloody diarrhea that has been ongoing for 2 weeks  Abdomen was distended on exam, nontender  C  difficile pending & enteric panel negative  CT abdomen with acute diverticulitis- started on IV abx- transitioned to oral course    Leukocytosis  Assessment & Plan  Noted with chronic leukocytosis    Chronically on steroids as an outpatient  Likely multifactorial in settings of being on steroids and malignant melanoma  And now with new found acute diverticulitis  Antibiotics started as above    Personal history of malignant melanoma  Assessment & Plan  Present on admission history of malignant melanoma with brain metastasis  Currently undergoing whole brain radiation  Currently on p o  chemotherapy-placed on hold in the setting of acute infection  Also maintained on chronic steroids for his hemolytic anemia  Previously maintained on daily prednisone-recently switched to dexamethasone 4 mg every 6 hours in the setting of brain mets -confirmed with wife  Overall guarded prognosis  Palliative care consult placed -declined here -can followup in outpt setting  Messaged on call oncologist to make aware of current hospitalization and plan  On going hematology/oncology outpatient [id number] has an appointment for Monday, 4/17/2023    Essential hypertension  Assessment & Plan  Present on admission history of hypertension    Had some lower blood pressures on admission  Therefore held Lasix, hydrochlorothiazide, Toprol, prazosin  Blood pressures with rise to the 140s with IV fluids  Resumed Toprol and prazosin while hospitalized  Continue oral diuretics upon discharge    Portal vein thrombosis  Assessment & Plan  History of pulmonary embolism, portal vein thrombosis 07/2022   Anticoagulated on Pradaxa    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  Assessment & Plan  Results from last 7 days   Lab Units 04/14/23  0649 04/13/23  1022 04/12/23  0536 04/11/23  1616   HEMOGLOBIN g/dL 10 3* 10 3* 10 9* 13 4   Baseline appearing to be around 12-14  Per patient's wife, he gets weekly CBC draws for close monitoring with Dr Voss Risk  Suspect drop in the setting of recent IV fluid dilution and acute stress  Continued on maintenance steroids-Decadron 4 mg every 6 hours  Continue ongoing outpatient monitoring of hemoglobin        Consultations During Hospital Stay:  · None    Procedures Performed:   CT abdomen pelvis wo contrast  Result Date: 4/12/2023  Impression: Findings consistent with mild acute diverticulitis junction of the descending colon and proximal sigmoid colon  No bowel obstruction  Nodule in the right retroperitoneum, anterior to the right kidney, new since the prior study  This may reflect a metastatic deposit  Additional findings as above  Workstation performed: [id number]     XR chest 1 view portable  Result Date: 4/12/2023  · Impression: Low lung volumes No acute cardiopulmonary disease  Workstation performed: [id number]     Significant Findings / Test Results:   · Acute diverticulitis  · Dehydration    Incidental Findings:   · None    Test Results Pending at Discharge (will require follow up): · None     Outpatient follow-up requested:  · Oncology-4/17/2023  · PCP-1 week    Complications: None    Hospital Course:     Len Payne is a 70 y o  male patient with a past medical history of malignant melanoma with mets to brain, essential pretension, history of PE anticoagulated on Pradaxa, hemolytic anemia  He originally presented to the hospital on 4/11/2023 due to diffuse watery diarrhea x2 weeks  Patient was admitted for generalized weakness and IV fluid hydration in the setting of poor oral intake  He later had an abdominal CAT scan performed which revealed acute diverticulitis  He was started on IV antibiotics given his immunocompromise state as well as leukocytosis and tachycardia  He slowly improved and his symptoms bowel movements subsided  He is eager to go home today  We will transition to oral antibiotics and have patient complete a 10-day antibiotic course  His stool cultures have remained negative  His chemo meds were placed on hold during his hospitalization  He was instructed to continue to hold these until he is seen in the office with oncology  His appointment is already scheduled for 4/17/2023    Case and plan discussed with wife via telephone who is in agreement  In conclusion, patient is stable for discharge at this time  Condition at Discharge: stable     Discharge Day Visit / Exam:     Subjective: Reports he feels better today  PT has had no further episodes of diarrhea  Discussed continuing antibiotics but transitioning to oral course which she will complete at home  Discussed stool studies were negative  Already has appointment with oncology on 4/17/2023 which he will obtain for further ongoing treatment plan  Vitals: Blood Pressure: 141/65 (04/14/23 0635)  Pulse: 73 (04/14/23 0635)  Temperature: 98 3 °F (36 8 °C) (04/14/23 0635)  Temp Source: Temporal (04/14/23 6158)  Respirations: 20 (04/14/23 2176)  Weight - Scale: 88 5 kg (195 lb) (04/11/23 1528)  SpO2: 95 % (04/14/23 4310)     Exam:   Physical Exam  Vitals and nursing note reviewed  Constitutional:       General: He is not in acute distress  Appearance: Normal appearance  He is obese  He is not ill-appearing, toxic-appearing or diaphoretic  HENT:      Head: Normocephalic and atraumatic  Eyes:      General: No scleral icterus  Cardiovascular:      Rate and Rhythm: Normal rate and regular rhythm  Pulmonary:      Effort: Pulmonary effort is normal  No respiratory distress  Breath sounds: Normal breath sounds  No stridor  No wheezing or rhonchi  Abdominal:      General: Bowel sounds are normal  There is no distension  Palpations: Abdomen is soft  There is no mass  Tenderness: There is no abdominal tenderness  Hernia: No hernia is present  Musculoskeletal:         General: No swelling  Cervical back: Neck supple  Skin:     General: Skin is warm and dry  Neurological:      Mental Status: He is alert and oriented to person, place, and time  Mental status is at baseline     Psychiatric:         Mood and Affect: Mood normal          Behavior: Behavior normal          Discussion with Family: Wife via telephone at bedside on speaker    Discharge instructions/Information to patient and family:   See after visit summary for information provided to patient and family  Provisions for Follow-Up Care:  See after visit summary for information related to follow-up care and any pertinent home health orders  Planned Readmission: no     Discharge Statement:  I spent 49 minutes discharging the patient  This time was spent on the day of discharge  I had direct contact with the patient on the day of discharge  Greater than 50% of the total time was spent examining patient, answering all patient questions, arranging and discussing plan of care with patient as well as directly providing post-discharge instructions  Additional time then spent on discharge activities  Discharge Medications:  See after visit summary for reconciled discharge medications provided to patient and family        ** Please Note: This note has been constructed using a voice recognition system **

## 2023-04-20 ENCOUNTER — APPOINTMENT (OUTPATIENT)
Dept: LAB | Facility: MEDICAL CENTER | Age: 68
End: 2023-04-20

## 2023-04-22 ENCOUNTER — HOSPITAL ENCOUNTER (INPATIENT)
Facility: HOSPITAL | Age: 68
LOS: 3 days | Discharge: HOME | End: 2023-04-25
Attending: EMERGENCY MEDICINE | Admitting: STUDENT IN AN ORGANIZED HEALTH CARE EDUCATION/TRAINING PROGRAM

## 2023-04-22 ENCOUNTER — APPOINTMENT (EMERGENCY)
Dept: RADIOLOGY | Facility: HOSPITAL | Age: 68
End: 2023-04-22

## 2023-04-22 ENCOUNTER — APPOINTMENT (EMERGENCY)
Dept: CT IMAGING | Facility: HOSPITAL | Age: 68
End: 2023-04-22

## 2023-04-22 DIAGNOSIS — C43.8 MALIGNANT MELANOMA OF OVERLAPPING SITES (HCC): ICD-10-CM

## 2023-04-22 DIAGNOSIS — D72.829 LEUKOCYTOSIS: ICD-10-CM

## 2023-04-22 DIAGNOSIS — E87.20 LACTIC ACIDOSIS: ICD-10-CM

## 2023-04-22 DIAGNOSIS — D59.10 AUTOIMMUNE HEMOLYTIC ANEMIA (HCC): ICD-10-CM

## 2023-04-22 DIAGNOSIS — R19.7 DIARRHEA: Primary | ICD-10-CM

## 2023-04-22 LAB
2HR DELTA HS TROPONIN: 17 NG/L
4HR DELTA HS TROPONIN: 32 NG/L
ALBUMIN SERPL BCP-MCNC: 3.7 G/DL (ref 3.5–5)
ALP SERPL-CCNC: 66 U/L (ref 34–104)
ALT SERPL W P-5'-P-CCNC: 57 U/L (ref 7–52)
ANION GAP SERPL CALCULATED.3IONS-SCNC: 15 MMOL/L (ref 4–13)
APTT PPP: 23 SECONDS (ref 23–37)
AST SERPL W P-5'-P-CCNC: 55 U/L (ref 13–39)
BACTERIA UR QL AUTO: ABNORMAL /HPF
BASOPHILS # BLD MANUAL: 0 THOUSAND/UL (ref 0–0.1)
BASOPHILS NFR MAR MANUAL: 0 % (ref 0–1)
BILIRUB SERPL-MCNC: 1.28 MG/DL (ref 0.2–1)
BILIRUB UR QL STRIP: NEGATIVE
BNP SERPL-MCNC: 77 PG/ML (ref 0–100)
BUN SERPL-MCNC: 21 MG/DL (ref 5–25)
CALCIUM SERPL-MCNC: 9.3 MG/DL (ref 8.4–10.2)
CARDIAC TROPONIN I PNL SERPL HS: 34 NG/L
CARDIAC TROPONIN I PNL SERPL HS: 51 NG/L
CARDIAC TROPONIN I PNL SERPL HS: 66 NG/L
CHLORIDE SERPL-SCNC: 98 MMOL/L (ref 96–108)
CLARITY UR: ABNORMAL
CO2 SERPL-SCNC: 26 MMOL/L (ref 21–32)
COLOR UR: YELLOW
CREAT SERPL-MCNC: 1.94 MG/DL (ref 0.6–1.3)
EOSINOPHIL # BLD MANUAL: 0.21 THOUSAND/UL (ref 0–0.4)
EOSINOPHIL NFR BLD MANUAL: 1 % (ref 0–6)
ERYTHROCYTE [DISTWIDTH] IN BLOOD BY AUTOMATED COUNT: 14.3 % (ref 11.6–15.1)
FLUAV RNA RESP QL NAA+PROBE: NEGATIVE
FLUBV RNA RESP QL NAA+PROBE: NEGATIVE
GFR SERPL CREATININE-BSD FRML MDRD: 34 ML/MIN/1.73SQ M
GLUCOSE SERPL-MCNC: 137 MG/DL (ref 65–140)
GLUCOSE UR STRIP-MCNC: NEGATIVE MG/DL
HCT VFR BLD AUTO: 43 % (ref 36.5–49.3)
HGB BLD-MCNC: 14 G/DL (ref 12–17)
HGB UR QL STRIP.AUTO: ABNORMAL
INR PPP: 1.16 (ref 0.84–1.19)
KETONES UR STRIP-MCNC: NEGATIVE MG/DL
LACTATE SERPL-SCNC: 1.4 MMOL/L (ref 0.5–2)
LACTATE SERPL-SCNC: 5.3 MMOL/L (ref 0.5–2)
LEUKOCYTE ESTERASE UR QL STRIP: NEGATIVE
LYMPHOCYTES # BLD AUTO: 0.42 THOUSAND/UL (ref 0.6–4.47)
LYMPHOCYTES # BLD AUTO: 2 % (ref 14–44)
MACROCYTES BLD QL AUTO: PRESENT
MCH RBC QN AUTO: 36.6 PG (ref 26.8–34.3)
MCHC RBC AUTO-ENTMCNC: 32.6 G/DL (ref 31.4–37.4)
MCV RBC AUTO: 113 FL (ref 82–98)
MONOCYTES # BLD AUTO: 1.7 THOUSAND/UL (ref 0–1.22)
MONOCYTES NFR BLD: 8 % (ref 4–12)
NEUTROPHILS # BLD MANUAL: 18.86 THOUSAND/UL (ref 1.85–7.62)
NEUTS SEG NFR BLD AUTO: 89 % (ref 43–75)
NITRITE UR QL STRIP: NEGATIVE
NON-SQ EPI CELLS URNS QL MICRO: ABNORMAL /HPF
PH UR STRIP.AUTO: 5.5 [PH]
PLATELET # BLD AUTO: 355 THOUSANDS/UL (ref 149–390)
PLATELET BLD QL SMEAR: ADEQUATE
PMV BLD AUTO: 10.1 FL (ref 8.9–12.7)
POLYCHROMASIA BLD QL SMEAR: PRESENT
POTASSIUM SERPL-SCNC: 3.2 MMOL/L (ref 3.5–5.3)
PROCALCITONIN SERPL-MCNC: 1.19 NG/ML
PROT SERPL-MCNC: 6.1 G/DL (ref 6.4–8.4)
PROT UR STRIP-MCNC: ABNORMAL MG/DL
PROTHROMBIN TIME: 14.8 SECONDS (ref 11.6–14.5)
RBC # BLD AUTO: 3.82 MILLION/UL (ref 3.88–5.62)
RBC #/AREA URNS AUTO: ABNORMAL /HPF
RSV RNA RESP QL NAA+PROBE: NEGATIVE
SARS-COV-2 RNA RESP QL NAA+PROBE: NEGATIVE
SODIUM SERPL-SCNC: 139 MMOL/L (ref 135–147)
SP GR UR STRIP.AUTO: <=1.005 (ref 1–1.03)
UROBILINOGEN UR QL STRIP.AUTO: 0.2 E.U./DL
WBC # BLD AUTO: 21.19 THOUSAND/UL (ref 4.31–10.16)
WBC #/AREA URNS AUTO: ABNORMAL /HPF

## 2023-04-22 RX ADMIN — SODIUM CHLORIDE 1000 ML: 0.9 INJECTION, SOLUTION INTRAVENOUS at 18:48

## 2023-04-22 RX ADMIN — IOHEXOL 100 ML: 350 INJECTION, SOLUTION INTRAVENOUS at 19:10

## 2023-04-22 RX ADMIN — SODIUM CHLORIDE 250 ML: 0.9 INJECTION, SOLUTION INTRAVENOUS at 17:42

## 2023-04-22 RX ADMIN — CEFEPIME HYDROCHLORIDE 2000 MG: 2 INJECTION, POWDER, FOR SOLUTION INTRAVENOUS at 18:14

## 2023-04-22 NOTE — ED PROVIDER NOTES
History  Chief Complaint   Patient presents with    Shortness of Breath     Pt c/o SO, cough x2 days  Pt recently hospitatlized for diverticulitis, c/o continued diarrhea  Arrives from EMS incontinent of stool  Hx of CA with mets to lungs     71 yo M h/o HTN, PE/portal vein thrombosis on Pradaxa, metastatic melanoma (to brain/lung/retroperitoneum), presenting for evaluation of SOB and diarrhea  Pt recently admitted for diarrhea/diverticulitis, discharged about 1 week ago, reports sx improved, unsure when they returned  States everything he eats causes immediate diarrhea and is covered in diarrhea on arrival  Denies dark/bloody stools  Reports he thinks he completed his oral abx  Reports few days of SOB, cough, chest congestion    Denies fevers, chills, HA, CP, abdominal pain, N/V, leg pain  Reports compliance with all medications  No current treatment for melanoma    MDM: 71 yo M with SOB/cough- cardiac workup, septic workup, COVID/Flu/RSV testing  - recurrent diarrhea- stool studies, abdominal labs, CT A/P to assess diverticulitis/rule out abscess/complication             Per independent review of external records:  - Admitted 4/11- 4/14 generalized weakness/diarrhea- diverticulitis- IV abx then transitioned to po  - 4/18/23 CTA chest: no PE, New scattered pulmonary nodules suspicious for metastases  - 4/11/23 CT A/P: Findings consistent with mild acute diverticulitis junction of the descending colon and proximal sigmoid colon  No bowel obstruction    Nodule in the right retroperitoneum, anterior to the right kidney, new since the prior study  This may reflect a metastatic deposit  - 10/25/22 ECHO: EF 55%      Prior to Admission Medications   Prescriptions Last Dose Informant Patient Reported? Taking?    ALPRAZolam (XANAX) 0 5 mg tablet Past Week  No Yes   Sig: Take 1 tablet (0 5 mg total) by mouth 2 (two) times a day Take one tablet one hour before scan and bring the second one with you to take right before the MRI if needed   VITAMIN D PO Past Week  Yes Yes   Sig: Take 1,000 Units by mouth daily    acetaminophen (TYLENOL) 325 mg tablet Past Week  No Yes   Sig: Take 2 tablets (650 mg total) by mouth every 6 (six) hours as needed for mild pain   ascorbic acid (VITAMIN C) 1000 MG tablet   No No   Sig: Take 1 tablet (1,000 mg total) by mouth every 12 (twelve) hours for 6 doses   Patient taking differently: Take 1,000 mg by mouth daily Every other day   ciprofloxacin (CIPRO) 500 mg tablet   No No   Sig: Take 1 tablet (500 mg total) by mouth every 12 (twelve) hours for 7 days   dabigatran etexilate (PRADAXA) 150 mg capsu Past Week  No Yes   Sig: Take 1 capsule (150 mg total) by mouth every 12 (twelve) hours   dexamethasone (DECADRON) 4 mg tablet Past Week  No Yes   Sig: Take 1 tablet (4 mg total) by mouth every 8 (eight) hours Please make sure you're on Protonix 40 mg daily for GI prophylaxis   hydrochlorothiazide (HYDRODIURIL) 25 mg tablet Past Week  No Yes   Sig: Take 1 tablet (25 mg total) by mouth daily   memantine (NAMENDA TITRATION PACK)   No No   Sig: Follow package directions and titration schedule reviewed at consultation  Call Radiation Oncology with questions     memantine (Namenda) 10 mg tablet Past Week  No Yes   Sig: Take 1 tablet (10 mg total) by mouth 2 (two) times a day   metoprolol succinate (TOPROL-XL) 25 mg 24 hr tablet Past Week  No Yes   Sig: Take 0 5 tablets (12 5 mg total) by mouth daily   metroNIDAZOLE (FLAGYL) 500 mg tablet   No No   Sig: Take 1 tablet (500 mg total) by mouth every 8 (eight) hours for 7 days   ondansetron (ZOFRAN) 4 mg tablet Past Month  No Yes   Sig: Take 1 tablet (4 mg total) by mouth every 8 (eight) hours as needed for nausea or vomiting   pantoprazole (PROTONIX) 40 mg tablet   No No   Sig: Take 1 tablet (40 mg total) by mouth daily   pantoprazole (PROTONIX) 40 mg tablet Past Week  No Yes   Sig: Take 1 tablet (40 mg total) by mouth daily   potassium chloride (K-DUR,KLOR-CON) 20 mEq tablet Past Week  No Yes   Sig: Take 1 tablet (20 mEq total) by mouth daily   prazosin (MINIPRESS) 1 mg capsule Past Week  Yes Yes   Sig: Take 1 mg by mouth daily at bedtime    saccharomyces boulardii (FLORASTOR) 250 mg capsule   No No   Sig: Take 1 capsule (250 mg total) by mouth 2 (two) times a day for 7 days   sodium chloride, PF, 0 9 %   No No   Sig: 10 mL by Intracatheter route daily Intracatheter flushing daily   May substitute prefilled syringe with normal saline 10 mL vials, 10 mL syringes, and 18 g blunt needles   sulfamethoxazole-trimethoprim (BACTRIM DS) 800-160 mg per tablet Not Taking  No No   Sig: Take 1 tablet by mouth 3 (three) times a week Monday, Wednesday and Friday   Patient not taking: Reported on 4/22/2023      Facility-Administered Medications: None       Past Medical History:   Diagnosis Date    Anemia 11/02/2016    Anxiety     Arthritis     Autoimmune hemolytic anemia (HCC)     Claustrophobia     COVID 12/2020    DVT (deep venous thrombosis) (HCC)     GERD (gastroesophageal reflux disease)     Hearing loss, right     Hemolytic anemia (HCC)     History of transfusion     2018 - no adverse reaction    Hypertension     Malignant melanoma of leg, right ([facility] [address] ) 07/01/2022    Palpitation     Portal vein thrombosis     PTSD (post-traumatic stress disorder)     Pulmonary emboli (HCC)     Squamous cell skin cancer 12/20/2022    SCCIS- 12/6/22    Tobacco abuse        Past Surgical History:   Procedure Laterality Date    CATARACT EXTRACTION Bilateral     CHOLECYSTECTOMY  07/18/2017    COLONOSCOPY      ELBOW ARTHROPLASTY Left     bursectomy    IR BIOPSY RETROPERITONEUM  3/17/2023    IR DRAINAGE TUBE CHECK WITH SCLEROSIS  10/06/2022    IR DRAINAGE TUBE CHECK WITH SCLEROSIS  10/10/2022    IR DRAINAGE TUBE CHECK WITH SCLEROSIS  10/20/2022    IR DRAINAGE TUBE CHECK WITH SCLEROSIS  10/27/2022    IR DRAINAGE TUBE CHECK WITH SCLEROSIS  11/04/2022    IR DRAINAGE TUBE CHECK WITH SCLEROSIS  11/15/2022    IR DRAINAGE TUBE CHECK WITH SCLEROSIS  11/25/2022    IR DRAINAGE TUBE PLACEMENT  09/19/2022    JOINT REPLACEMENT Right 02/02/2021    knee    KNEE SURGERY Right     meniscus tear    LYMPH NODE BIOPSY Right 07/29/2022    Procedure: BIOPSY LYMPH NODE SENTINEL;  Surgeon: Erma Allison MD;  [facility];  Service: Surgical Oncology    MOHS SURGERY Right 12/20/2022    Right dorsal hand HUMPHREYIS-Dr Gerry Yang    OR ARTHRP KNE CONDYLE&PLATU MEDIAL&LAT COMPARTMENTS Right 02/02/2021    Procedure: ARTHROPLASTY KNEE TOTAL;  Surgeon: Isaiah Morales MD;  [facility];  Service: Orthopedics    OR ARTHRP KNE CONDYLE&PLATU MEDIAL&LAT COMPARTMENTS Left 11/17/2021    Procedure: TOTAL KNEE REPLACEMENT;  Surgeon: Isaiah Morales MD;  [facility];  Service: Orthopedics    OR LAPS SURG Alex Clamp Chela Solomon N/A 12/23/2017    Procedure: CHOLECYSTECTOMY LAPAROSCOPIC with cholangiogram;  Surgeon: Cristo Weber MD;  [facility];  Service: General    OR SPLENECTOMY TOTAL SEPARATE PROCEDURE N/A 05/18/2017    Procedure: LAPAROSCOPIC HAND ASSIST SPLENECTOMY;  Surgeon: John Patterson MD;  [facility];  Service: Surgical Oncology    SHOULDER SURGERY Left     rotator cuff x4, reconstruction    SKIN BIOPSY  5 May 2022   [facility] SKIN CANCER EXCISION  29 July 2022    SKIN LESION EXCISION Right 07/29/2022    Procedure: WIDE EXCISION RIGHT MEDIAL THIGH;  Surgeon: Erma Allison MD;  [facility];  Service: Surgical Oncology       Family History   Problem Relation Age of Onset    Cancer Mother     Breast cancer Mother     Other Father         CABG    Heart attack Paternal Grandfather         acute MI     I have reviewed and agree with the history as documented      E-Cigarette/Vaping    E-Cigarette Use Never User      E-Cigarette/Vaping Substances    Nicotine No     THC No     CBD No     Flavoring No     Other No     Unknown No      Social History Tobacco Use    Smoking status: Some Days     Packs/day: 0 00     Years: 5 00     Pack years: 0 00     Types: Cigars, Cigarettes    Smokeless tobacco: Current     Types: Snuff    Tobacco comments:     5  a week average   Vaping Use    Vaping Use: Never used   Substance Use Topics    Alcohol use: Yes     Alcohol/week: 6 0 standard drinks     Types: 6 Cans of beer per week     Comment: socially    Drug use: Yes     Types: Marijuana     Comment: medical marijuana       Review of Systems    Physical Exam  Physical Exam  Vitals and nursing note reviewed  Constitutional:       Appearance: He is well-developed  He is not diaphoretic  HENT:      Head: Normocephalic and atraumatic  Nose: Nose normal    Eyes:      Conjunctiva/sclera: Conjunctivae normal    Cardiovascular:      Rate and Rhythm: Regular rhythm  Tachycardia present  Heart sounds: Normal heart sounds  Pulmonary:      Breath sounds: No stridor  Rhonchi present  No rales  Comments: tachypneic  Chest:      Chest wall: No tenderness  Abdominal:      General: There is no distension  Palpations: Abdomen is soft  Tenderness: There is no abdominal tenderness  There is no guarding or rebound  Musculoskeletal:         General: No deformity  Cervical back: Normal range of motion and neck supple  Comments: B/l 1+ lower edema, no calf swelling/ttp, distally NV intact   Skin:     General: Skin is warm and dry  Findings: No rash  Neurological:      Mental Status: He is alert and oriented to person, place, and time  Motor: No abnormal muscle tone  Coordination: Coordination normal    Psychiatric:         Thought Content:  Thought content normal          Judgment: Judgment normal          Vital Signs  ED Triage Vitals   Temperature Pulse Respirations Blood Pressure SpO2   04/22/23 1706 04/22/23 1706 04/22/23 1706 04/22/23 1706 04/22/23 1706   98 3 °F (36 8 °C) (!) 123 (!) 24 132/76 96 %      Temp Source Heart Rate Source Patient Position - Orthostatic VS BP Location FiO2 (%)   04/22/23 1706 04/22/23 1706 04/22/23 1706 04/22/23 1706 --   Oral Monitor Lying Right arm       Pain Score       04/22/23 1808       No Pain           Vitals:    04/22/23 2245 04/22/23 2335 04/23/23 0222 04/23/23 0711   BP: 125/67 138/78 134/82 127/61   Pulse: 80 78 83 94   Patient Position - Orthostatic VS: Lying Lying  Lying         Visual Acuity      ED Medications  Medications   ALPRAZolam (XANAX) tablet 0 5 mg (0 5 mg Oral Refused 4/23/23 0221)   dabigatran etexilate (PRADAXA) capsule 150 mg (150 mg Oral Given 4/23/23 0221)   hydrochlorothiazide (HYDRODIURIL) tablet 25 mg (has no administration in time range)   memantine (NAMENDA) tablet 10 mg (has no administration in time range)   metoprolol succinate (TOPROL-XL) 24 hr tablet 12 5 mg (has no administration in time range)   pantoprazole (PROTONIX) EC tablet 40 mg (40 mg Oral Given 4/23/23 0615)   prazosin (MINIPRESS) capsule 1 mg (1 mg Oral Given 4/23/23 0222)   acetaminophen (TYLENOL) tablet 650 mg (has no administration in time range)   ondansetron (ZOFRAN) injection 4 mg (has no administration in time range)   aluminum-magnesium hydroxide-simethicone (MYLANTA) oral suspension 30 mL (has no administration in time range)   cefepime (MAXIPIME) 2,000 mg in dextrose 5 % 50 mL IVPB (2,000 mg Intravenous New Bag 4/23/23 0613)   dexamethasone (DECADRON) tablet 4 mg (4 mg Oral Given 4/23/23 0221)   potassium chloride 20 mEq IVPB (premix) (has no administration in time range)     Followed by   potassium chloride 20 mEq IVPB (premix) (has no administration in time range)   sodium chloride 0 9 % bolus 250 mL (0 mL Intravenous Stopped 4/22/23 1822)   cefepime (MAXIPIME) 2 g/50 mL dextrose IVPB (0 mg Intravenous Stopped 4/22/23 1844)   sodium chloride 0 9 % bolus 1,000 mL (0 mL Intravenous Stopped 4/22/23 1948)   iohexol (OMNIPAQUE) 350 MG/ML injection (SINGLE-DOSE) 100 mL (100 mL Intravenous Given 4/22/23 1910)       Diagnostic Studies  Results Reviewed     Procedure Component Value Units Date/Time    Blood culture #1 [[id number]] Collected: 04/22/23 1805    Lab Status: Preliminary result Specimen: Blood from Hand, Right Updated: 04/23/23 0201     Blood Culture Received in Microbiology Lab  Culture in Progress  Blood culture #2 [[id number]] Collected: 04/22/23 1732    Lab Status: Preliminary result Specimen: Blood from Arm, Left Updated: 04/23/23 0201     Blood Culture Received in Microbiology Lab  Culture in Progress      HS Troponin I 4hr [[id number]]  (Abnormal) Collected: 04/22/23 2150    Lab Status: Final result Specimen: Blood from Arm, Left Updated: 04/22/23 2227     hs TnI 4hr 66 ng/L      Delta 4hr hsTnI 32 ng/L     Urine Microscopic [[id number]]  (Abnormal) Collected: 04/22/23 2114    Lab Status: Final result Specimen: Urine, Other Updated: 04/22/23 2215     RBC, UA 0-1 /hpf      WBC, UA 2-4 /hpf      Epithelial Cells Occasional /hpf      Bacteria, UA Occasional /hpf     UA w Reflex to Microscopic w Reflex to Culture [[id number]]  (Abnormal) Collected: 04/22/23 2114    Lab Status: Final result Specimen: Urine, Other Updated: 04/22/23 2148     Color, UA Yellow     Clarity, UA Slightly Cloudy     Specific Gravity, UA <=1 005     pH, UA 5 5     Leukocytes, UA Negative     Nitrite, UA Negative     Protein, UA 30 (1+) mg/dl      Glucose, UA Negative mg/dl      Ketones, UA Negative mg/dl      Urobilinogen, UA 0 2 E U /dl      Bilirubin, UA Negative     Occult Blood, UA Trace-Intact    HS Troponin I 2hr [[id number]]  (Abnormal) Collected: 04/22/23 1934    Lab Status: Final result Specimen: Blood from Arm, Left Updated: 04/22/23 2005     hs TnI 2hr 51 ng/L      Delta 2hr hsTnI 17 ng/L     Lactic acid 2 Hours [[id number]]  (Normal) Collected: 04/22/23 1934    Lab Status: Final result Specimen: Blood from Arm, Left Updated: 04/22/23 1956     LACTIC ACID 1 4 mmol/L     Narrative:      Result may be elevated if tourniquet was used during collection  Manual Differential(PHLEBS Do Not Order) [[id number]]  (Abnormal) Collected: 04/22/23 1732    Lab Status: Final result Specimen: Blood from Arm, Left Updated: 04/22/23 1948     Segmented % 89 %      Lymphocytes % 2 %      Monocytes % 8 %      Eosinophils, % 1 %      Basophils % 0 %      Absolute Neutrophils 18 86 Thousand/uL      Lymphocytes Absolute 0 42 Thousand/uL      Monocytes Absolute 1 70 Thousand/uL      Eosinophils Absolute 0 21 Thousand/uL      Basophils Absolute 0 00 Thousand/uL      Total Counted --     Macrocytes Present     Polychromasia Present     Platelet Estimate Adequate    Lactic acid [[id number]]  (Abnormal) Collected: 04/22/23 1732    Lab Status: Final result Specimen: Blood from Arm, Left Updated: 04/22/23 1824     LACTIC ACID 5 3 mmol/L     Narrative:      Result may be elevated if tourniquet was used during collection  FLU/RSV/COVID - if FLU/RSV clinically relevant [[id number]]  (Normal) Collected: 04/22/23 1732    Lab Status: Final result Specimen: Nares from Nose Updated: 04/22/23 1816     SARS-CoV-2 Negative     INFLUENZA A PCR Negative     INFLUENZA B PCR Negative     RSV PCR Negative    Narrative:      FOR PEDIATRIC PATIENTS - copy/paste COVID Guidelines URL to browser: [email or web address]  [employer]    SARS-CoV-2 assay is a Nucleic Acid Amplification assay intended for the  qualitative detection of nucleic acid from SARS-CoV-2 in nasopharyngeal  swabs  Results are for the presumptive identification of SARS-CoV-2 RNA  Positive results are indicative of infection with SARS-CoV-2, the virus  causing COVID-19, but do not rule out bacterial infection or co-infection  with other viruses  Laboratories within the [place] and its  territories are required to report all positive results to the appropriate  public health authorities   Negative results do not preclude SARS-CoV-2  infection and should not be used as the sole basis for treatment or other  patient management decisions  Negative results must be combined with  clinical observations, patient history, and epidemiological information  This test has not been FDA cleared or approved  This test has been authorized by FDA under an Emergency Use Authorization  (EUA)  This test is only authorized for the duration of time the  declaration that circumstances exist justifying the authorization of the  emergency use of an in vitro diagnostic tests for detection of SARS-CoV-2  virus and/or diagnosis of COVID-19 infection under section 564(b)(1) of  the Act, 21 U  S C  304GBU-7(C)(1), unless the authorization is terminated  or revoked sooner  The test has been validated but independent review by FDA  and CLIA is pending  Test performed using tabulate GeneXpert: This RT-PCR assay targets N2,  a region unique to SARS-CoV-2  A conserved region in the E-gene was chosen  for pan-Sarbecovirus detection which includes SARS-CoV-2  According to CMS-2020-01-R, this platform meets the definition of high-throughput technology      Procalcitonin [[id number]]  (Abnormal) Collected: 04/22/23 1732    Lab Status: Final result Specimen: Blood from Arm, Right Updated: 04/22/23 1811     Procalcitonin 1 19 ng/ml     HS Troponin 0hr (reflex protocol) [[id number]]  (Normal) Collected: 04/22/23 1732    Lab Status: Final result Specimen: Blood from Arm, Left Updated: 04/22/23 1808     hs TnI 0hr 34 ng/L     B-Type Natriuretic Peptide(BNP) [[id number]]  (Normal) Collected: 04/22/23 1732    Lab Status: Final result Specimen: Blood from Arm, Left Updated: 04/22/23 1807     BNP 77 pg/mL     Comprehensive metabolic panel [[id number]]  (Abnormal) Collected: 04/22/23 1732    Lab Status: Final result Specimen: Blood from Arm, Left Updated: 04/22/23 1805     Sodium 139 mmol/L      Potassium 3 2 mmol/L      Chloride 98 mmol/L      CO2 26 mmol/L      ANION GAP 15 mmol/L      BUN 21 mg/dL Creatinine 1 94 mg/dL      Glucose 137 mg/dL      Calcium 9 3 mg/dL      AST 55 U/L      ALT 57 U/L      Alkaline Phosphatase 66 U/L      Total Protein 6 1 g/dL      Albumin 3 7 g/dL      Total Bilirubin 1 28 mg/dL      eGFR 34 ml/min/1 73sq m     Narrative:      [place] guidelines for Chronic Kidney Disease (CKD):     Stage 1 with normal or high GFR (GFR > 90 mL/min/1 73 square meters)    Stage 2 Mild CKD (GFR = 60-89 mL/min/1 73 square meters)    Stage 3A Moderate CKD (GFR = 45-59 mL/min/1 73 square meters)    Stage 3B Moderate CKD (GFR = 30-44 mL/min/1 73 square meters)    Stage 4 Severe CKD (GFR = 15-29 mL/min/1 73 square meters)    Stage 5 End Stage CKD (GFR <15 mL/min/1 73 square meters)  Note: GFR calculation is accurate only with a steady state creatinine    Protime-INR [[id number]]  (Abnormal) Collected: 04/22/23 1734    Lab Status: Final result Specimen: Blood from Arm, Left Updated: 04/22/23 1757     Protime 14 8 seconds      INR 1 16    APTT [[id number]]  (Normal) Collected: 04/22/23 1734    Lab Status: Final result Specimen: Blood from Arm, Left Updated: 04/22/23 1757     PTT 23 seconds     CBC and differential [[id number]]  (Abnormal) Collected: 04/22/23 1732    Lab Status: Final result Specimen: Blood from Arm, Left Updated: 04/22/23 1741     WBC 21 19 Thousand/uL      RBC 3 82 Million/uL      Hemoglobin 14 0 g/dL      Hematocrit 43 0 %       fL      MCH 36 6 pg      MCHC 32 6 g/dL      RDW 14 3 %      MPV 10 1 fL      Platelets 808 Thousands/uL     Stool Enteric Bacterial Panel by PCR [[id number]]     Lab Status: No result Specimen: Stool from Per Rectum     Clostridium difficile toxin by PCR with EIA [[id number]]     Lab Status: No result Specimen: Stool from Per Rectum                  CT chest abdomen pelvis w contrast   Final Result by Ammy Mast MD (04/22 2033)      No acute findings in the chest, abdomen or pelvis        Resolved descending colonic diverticulitis without new bowel findings  Multiple subtle enhancing lesions throughout the liver in keeping with numerous hepatic metastases  Known pulmonary and retroperitoneal metastases  Unchanged right iliac chain and inguinal adenopathy in this patient with metastatic melanoma  Known osseous metastases are not well visualized on this study         The study was marked in EPIC for immediate notification  Workstation performed: [id number]         XR chest 1 view portable   Final Result by Herberth Ashton MD (04/22 2034)      No acute cardiopulmonary disease  Workstation performed: [id number]                    Procedures  Procedures         ED Course  ED Course as of 04/23/23 0805   Sat Apr 22, 2023   1717 Pulse(!): 123   1717 Respirations(!): 24   1717 SpO2: 96 %  On RA   1717 EKG independently interpreted by myself:  sinus tachycardia @ 120 bpm, normal axis, normal intervals, qtc 429, no sig st changes                            Initial Sepsis Screening     Row Name 04/22/23 1716                Is the patient's history suggestive of a new or worsening infection? Yes (Proceed)  -[address]        Suspected source of infection pneumonia;acute abdominal infection;suspect infection, source unknown  -KH        Indicate SIRS criteria Tachycardia > 90 bpm;Tachypnea > 20 resp per min  -KH        Are two or more of the above signs & symptoms of infection both present and new to the patient? Yes (Proceed)  -KH        Assess for evidence of organ dysfunction: Are any of the below criteria present within 6 hours of suspected infection and SIRS criteria that are NOT considered to be chronic conditions?  --              User Key  (r) = Recorded By, (t) = Taken By, (c) = Cosigned By    [address] Name Provider Type    [address] [place] [address], DO Physician              Default Flowsheet Data (last 720 hours)     Sepsis Reassess     Row Name 04/23/23 0141                   Repeat Volume Status and "Tissue Perfusion Assessment Performed    Date of Reassessment: 04/23/23  -NK        Time of Reassessment: 0100 -NK        Sepsis Reassessment Note: Click \"NEXT\" below (NOT \"close\") to generate sepsis reassessment note  YES (proceed by clicking \"NEXT\")  -NK        Repeat Volume Status and Tissue Perfusion Assessment Performed --              User Key  (r) = Recorded By, (t) = Taken By, (c) = Cosigned By    [address] Name Provider Type    NK Yadira Diaz PA-C Physician Assistant                MARTINA Risk Score    Flowsheet Row Most Recent Value   Age >= 72 1 Filed at: 04/23/2023 0146   Known CAD (stenosis >= 50%) 0 Filed at: 04/23/2023 0146   Recent (<=24 hrs) Service Angina 0 Filed at: 04/23/2023 0146   ST Deviation >= 0 5 mm 0 Filed at: 04/23/2023 0146   3+ CAD Risk Factors (FHx, HTN, HLP, DM, Smoker) 1 Filed at: 04/23/2023 0146   Aspirin Use Past 7 Days 0 Filed at: 04/23/2023 0146   Elevated Cardiac Markers 1 Filed at: 04/23/2023 0146   MARTINA Risk Score (Calculated) 3 Filed at: 04/23/2023 0146                  Medical Decision Making  69 yo M presenting for evaluation of cough/chest congestion/SOB and diarrhea-   SIRS on arrival, therefore started on broad spectrum abx  Signed out to Dr Barb Felix pending results/workup and then pt will need admission    Diarrhea: acute illness or injury  Lactic acidosis: acute illness or injury  Leukocytosis: acute illness or injury  Amount and/or Complexity of Data Reviewed  External Data Reviewed: labs, radiology and notes  Labs: ordered  Decision-making details documented in ED Course  Radiology: ordered  ECG/medicine tests: ordered and independent interpretation performed  Decision-making details documented in ED Course  Risk  Prescription drug management  Decision regarding hospitalization            Disposition  Final diagnoses:   Diarrhea   Lactic acidosis   Leukocytosis     Time reflects when diagnosis was documented in both MDM as applicable and the " Disposition within this note     Time User Action Codes Description Comment    4/22/2023 10:15 PM Bianka Cook Add [R19 7] Diarrhea     4/22/2023 10:16 PM Bianka Cook L Add [E87 20] Lactic acidosis     4/22/2023 10:16 PM Bianka Cook Add [R79 629] Leukocytosis       ED Disposition     ED Disposition   Admit    Condition   Stable    Date/Time   Sat Apr 22, 2023 10:15 PM    Comment   Case was discussed with [facility] and the patient's admission status was agreed to be Admission Status: inpatient status to the service of Dr Prabha Dominguez   Follow-up Information    None         Current Discharge Medication List      CONTINUE these medications which have NOT CHANGED    Details   acetaminophen (TYLENOL) 325 mg tablet Take 2 tablets (650 mg total) by mouth every 6 (six) hours as needed for mild pain  Qty:  , Refills: 0    Associated Diagnoses: Primary localized osteoarthrosis of the knee, right      ALPRAZolam (XANAX) 0 5 mg tablet Take 1 tablet (0 5 mg total) by mouth 2 (two) times a day Take one tablet one hour before scan and bring the second one with you to take right before the MRI if needed  Qty: 2 tablet, Refills: 0    Associated Diagnoses: Malignant melanoma of leg, right (HCC)      dabigatran etexilate (PRADAXA) 150 mg capsu Take 1 capsule (150 mg total) by mouth every 12 (twelve) hours  Qty: 60 capsule, Refills: 0    Comments: 75mg BID x 3 days and then resume 150mg BID  Cut tablets in 1/2 from home dose  Associated Diagnoses: Primary localized osteoarthrosis of the knee, right      dexamethasone (DECADRON) 4 mg tablet Take 1 tablet (4 mg total) by mouth every 8 (eight) hours Please make sure you're on Protonix 40 mg daily for GI prophylaxis  Qty: 42 tablet, Refills: 0    Associated Diagnoses: Metastatic melanoma ([address] );  Metastasis to brain (HCC)      hydrochlorothiazide (HYDRODIURIL) 25 mg tablet Take 1 tablet (25 mg total) by mouth daily  Qty: 30 tablet, Refills: 5 Associated Diagnoses: Essential hypertension      !! memantine (Namenda) 10 mg tablet Take 1 tablet (10 mg total) by mouth 2 (two) times a day  Qty: 60 tablet, Refills: 1    Associated Diagnoses: Metastatic melanoma (HCC)      metoprolol succinate (TOPROL-XL) 25 mg 24 hr tablet Take 0 5 tablets (12 5 mg total) by mouth daily  Qty: 30 tablet, Refills: 5    Associated Diagnoses: Essential hypertension      ondansetron (ZOFRAN) 4 mg tablet Take 1 tablet (4 mg total) by mouth every 8 (eight) hours as needed for nausea or vomiting  Qty: 20 tablet, Refills: 0    Associated Diagnoses: High risk medication use; Nausea; Malignant melanoma of leg, right ([facility] [address] )      ! ! pantoprazole (PROTONIX) 40 mg tablet Take 1 tablet (40 mg total) by mouth daily  Qty: 30 tablet, Refills: 1    Associated Diagnoses: Metastasis to brain (HCC)      potassium chloride (K-DUR,KLOR-CON) 20 mEq tablet Take 1 tablet (20 mEq total) by mouth daily  Qty: 30 tablet, Refills: 3    Associated Diagnoses: Hypokalemia      prazosin (MINIPRESS) 1 mg capsule Take 1 mg by mouth daily at bedtime       VITAMIN D PO Take 1,000 Units by mouth daily       ascorbic acid (VITAMIN C) 1000 MG tablet Take 1 tablet (1,000 mg total) by mouth every 12 (twelve) hours for 6 doses  Qty: 6 tablet, Refills: 0    Associated Diagnoses: COVID-19 virus infection      !! memantine (NAMENDA TITRATION PACK) Follow package directions and titration schedule reviewed at consultation  Call Radiation Oncology with questions  Qty: 49 tablet, Refills: 0    Associated Diagnoses: [facility] ([facility] ); Metastasis to brain [facility])      ! ! pantoprazole (PROTONIX) 40 mg tablet Take 1 tablet (40 mg total) by mouth daily  Qty: 90 tablet, Refills: 3    Associated Diagnoses: Gastroesophageal reflux disease, esophagitis presence not specified      sodium chloride, PF, 0 9 % 10 mL by Intracatheter route daily Intracatheter flushing daily   May substitute prefilled syringe with normal saline 10 mL vials, 10 mL syringes, and 18 g blunt needles  Qty: 300 mL, Refills: 0    Associated Diagnoses: Malignant melanoma of leg, right (HCC)      sulfamethoxazole-trimethoprim (BACTRIM DS) 800-160 mg per tablet Take 1 tablet by mouth 3 (three) times a week Monday, Wednesday and Friday  Qty: 12 tablet, Refills: 0    Associated Diagnoses: Immunosuppression due to chronic steroid use ([facility] [address] )       ! ! - Potential duplicate medications found  Please discuss with provider  STOP taking these medications       ciprofloxacin (CIPRO) 500 mg tablet Comments:   Reason for Stopping:         metroNIDAZOLE (FLAGYL) 500 mg tablet Comments:   Reason for Stopping:         saccharomyces boulardii (FLORASTOR) 250 mg capsule Comments:   Reason for Stopping:               No discharge procedures on file      PDMP Review       Value Time User    PDMP Reviewed  Yes 3/3/2023  1:12 PM Keiko Sommer MD          ED Provider  Electronically Signed by           Gini Moon,   04/23/23 9486

## 2023-04-22 NOTE — ED NOTES
Pt aware of necessary urine sample reports he is unable to void at this time     Rikki Farias RN  04/22/23 1934

## 2023-04-23 ENCOUNTER — APPOINTMENT (INPATIENT)
Dept: NON INVASIVE DIAGNOSTICS | Facility: HOSPITAL | Age: 68
End: 2023-04-23

## 2023-04-23 LAB
ANION GAP SERPL CALCULATED.3IONS-SCNC: 9 MMOL/L (ref 4–13)
AORTIC ROOT: 3.4 CM
APICAL FOUR CHAMBER EJECTION FRACTION: 66 %
ASCENDING AORTA: 3.7 CM
ATRIAL RATE: 120 BPM
BASOPHILS # BLD AUTO: 0.07 THOUSANDS/ΜL (ref 0–0.1)
BASOPHILS NFR BLD AUTO: 0 % (ref 0–1)
BUN SERPL-MCNC: 23 MG/DL (ref 5–25)
CALCIUM SERPL-MCNC: 7.8 MG/DL (ref 8.4–10.2)
CHLORIDE SERPL-SCNC: 103 MMOL/L (ref 96–108)
CO2 SERPL-SCNC: 27 MMOL/L (ref 21–32)
CREAT SERPL-MCNC: 1.61 MG/DL (ref 0.6–1.3)
E WAVE DECELERATION TIME: 229 MS
EOSINOPHIL # BLD AUTO: 0.28 THOUSAND/ΜL (ref 0–0.61)
EOSINOPHIL NFR BLD AUTO: 2 % (ref 0–6)
ERYTHROCYTE [DISTWIDTH] IN BLOOD BY AUTOMATED COUNT: 14.3 % (ref 11.6–15.1)
FRACTIONAL SHORTENING: 28 % (ref 28–44)
GFR SERPL CREATININE-BSD FRML MDRD: 42 ML/MIN/1.73SQ M
GLUCOSE SERPL-MCNC: 77 MG/DL (ref 65–140)
HCT VFR BLD AUTO: 36.5 % (ref 36.5–49.3)
HGB BLD-MCNC: 11.7 G/DL (ref 12–17)
IMM GRANULOCYTES # BLD AUTO: 0.36 THOUSAND/UL (ref 0–0.2)
IMM GRANULOCYTES NFR BLD AUTO: 2 % (ref 0–2)
INTERVENTRICULAR SEPTUM IN DIASTOLE (PARASTERNAL SHORT AXIS VIEW): 1.1 CM
INTERVENTRICULAR SEPTUM: 1.1 CM (ref 0.6–1.1)
LAAS-AP2: 14.2 CM2
LAAS-AP4: 17.9 CM2
LEFT ATRIUM AREA SYSTOLE SINGLE PLANE A4C: 15.6 CM2
LEFT ATRIUM SIZE: 3.5 CM
LEFT INTERNAL DIMENSION IN SYSTOLE: 2.3 CM (ref 2.1–4)
LEFT VENTRICULAR INTERNAL DIMENSION IN DIASTOLE: 3.2 CM (ref 3.5–6)
LEFT VENTRICULAR POSTERIOR WALL IN END DIASTOLE: 1.1 CM
LEFT VENTRICULAR STROKE VOLUME: 24 ML
LVSV (TEICH): 24 ML
LYMPHOCYTES # BLD AUTO: 1.31 THOUSANDS/ΜL (ref 0.6–4.47)
LYMPHOCYTES NFR BLD AUTO: 8 % (ref 14–44)
MAGNESIUM SERPL-MCNC: 1.9 MG/DL (ref 1.9–2.7)
MCH RBC QN AUTO: 36.6 PG (ref 26.8–34.3)
MCHC RBC AUTO-ENTMCNC: 32.1 G/DL (ref 31.4–37.4)
MCV RBC AUTO: 114 FL (ref 82–98)
MONOCYTES # BLD AUTO: 0.96 THOUSAND/ΜL (ref 0.17–1.22)
MONOCYTES NFR BLD AUTO: 6 % (ref 4–12)
MV E'TISSUE VEL-SEP: 8 CM/S
MV PEAK A VEL: 0.9 M/S
MV PEAK E VEL: 49 CM/S
MV STENOSIS PRESSURE HALF TIME: 66 MS
MV VALVE AREA P 1/2 METHOD: 3.33 CM2
NEUTROPHILS # BLD AUTO: 13.2 THOUSANDS/ΜL (ref 1.85–7.62)
NEUTS SEG NFR BLD AUTO: 82 % (ref 43–75)
NRBC BLD AUTO-RTO: 2 /100 WBCS
P AXIS: 62 DEGREES
PLATELET # BLD AUTO: 318 THOUSANDS/UL (ref 149–390)
PMV BLD AUTO: 11 FL (ref 8.9–12.7)
POTASSIUM SERPL-SCNC: 2.9 MMOL/L (ref 3.5–5.3)
PR INTERVAL: 146 MS
QRS AXIS: 65 DEGREES
QRSD INTERVAL: 76 MS
QT INTERVAL: 304 MS
QTC INTERVAL: 429 MS
RA PRESSURE ESTIMATED: 3 MMHG
RBC # BLD AUTO: 3.2 MILLION/UL (ref 3.88–5.62)
RIGHT ATRIUM AREA SYSTOLE A4C: 12.6 CM2
RIGHT VENTRICLE ID DIMENSION: 3.5 CM
RV PSP: 28 MMHG
SL CV LEFT ATRIUM LENGTH A2C: 4.6 CM
SL CV LV EF: 65
SL CV PED ECHO LEFT VENTRICLE DIASTOLIC VOLUME (MOD BIPLANE) 2D: 42 ML
SL CV PED ECHO LEFT VENTRICLE SYSTOLIC VOLUME (MOD BIPLANE) 2D: 19 ML
SODIUM SERPL-SCNC: 139 MMOL/L (ref 135–147)
T WAVE AXIS: 23 DEGREES
TR MAX PG: 25 MMHG
TR PEAK VELOCITY: 2.5 M/S
TRICUSPID ANNULAR PLANE SYSTOLIC EXCURSION: 2.1 CM
TRICUSPID VALVE PEAK REGURGITATION VELOCITY: 2.5 M/S
VENTRICULAR RATE: 120 BPM
WBC # BLD AUTO: 16.18 THOUSAND/UL (ref 4.31–10.16)

## 2023-04-23 RX ORDER — ALPRAZOLAM 0.5 MG/1
0.5 TABLET ORAL 2 TIMES DAILY
Status: DISCONTINUED | OUTPATIENT
Start: 2023-04-23 | End: 2023-04-25 | Stop reason: HOSPADM

## 2023-04-23 RX ORDER — PANTOPRAZOLE SODIUM 40 MG/1
40 TABLET, DELAYED RELEASE ORAL
Status: DISCONTINUED | OUTPATIENT
Start: 2023-04-23 | End: 2023-04-25 | Stop reason: HOSPADM

## 2023-04-23 RX ORDER — HYDROCHLOROTHIAZIDE 25 MG/1
25 TABLET ORAL DAILY
Status: DISCONTINUED | OUTPATIENT
Start: 2023-04-23 | End: 2023-04-23

## 2023-04-23 RX ORDER — PRAZOSIN HYDROCHLORIDE 1 MG/1
1 CAPSULE ORAL
Status: DISCONTINUED | OUTPATIENT
Start: 2023-04-23 | End: 2023-04-25 | Stop reason: HOSPADM

## 2023-04-23 RX ORDER — DEXAMETHASONE 4 MG/1
4 TABLET ORAL EVERY 8 HOURS
Status: DISCONTINUED | OUTPATIENT
Start: 2023-04-23 | End: 2023-04-23

## 2023-04-23 RX ORDER — POTASSIUM CHLORIDE 14.9 MG/ML
20 INJECTION INTRAVENOUS ONCE
Status: COMPLETED | OUTPATIENT
Start: 2023-04-23 | End: 2023-04-23

## 2023-04-23 RX ORDER — POTASSIUM CHLORIDE 20 MEQ/1
40 TABLET, EXTENDED RELEASE ORAL ONCE
Status: COMPLETED | OUTPATIENT
Start: 2023-04-23 | End: 2023-04-23

## 2023-04-23 RX ORDER — METOPROLOL SUCCINATE 25 MG/1
12.5 TABLET, EXTENDED RELEASE ORAL DAILY
Status: DISCONTINUED | OUTPATIENT
Start: 2023-04-23 | End: 2023-04-25 | Stop reason: HOSPADM

## 2023-04-23 RX ORDER — POTASSIUM CHLORIDE 20 MEQ/1
40 TABLET, EXTENDED RELEASE ORAL DAILY
Status: DISCONTINUED | OUTPATIENT
Start: 2023-04-23 | End: 2023-04-23

## 2023-04-23 RX ORDER — MAGNESIUM HYDROXIDE/ALUMINUM HYDROXICE/SIMETHICONE 120; 1200; 1200 MG/30ML; MG/30ML; MG/30ML
30 SUSPENSION ORAL EVERY 6 HOURS PRN
Status: DISCONTINUED | OUTPATIENT
Start: 2023-04-23 | End: 2023-04-25 | Stop reason: HOSPADM

## 2023-04-23 RX ORDER — FUROSEMIDE 10 MG/ML
20 INJECTION INTRAMUSCULAR; INTRAVENOUS ONCE
Status: DISCONTINUED | OUTPATIENT
Start: 2023-04-23 | End: 2023-04-23

## 2023-04-23 RX ORDER — DEXAMETHASONE 4 MG/1
4 TABLET ORAL EVERY 6 HOURS SCHEDULED
Status: DISCONTINUED | OUTPATIENT
Start: 2023-04-23 | End: 2023-04-24

## 2023-04-23 RX ORDER — MEMANTINE HYDROCHLORIDE 5 MG/1
10 TABLET ORAL 2 TIMES DAILY
Status: DISCONTINUED | OUTPATIENT
Start: 2023-04-23 | End: 2023-04-25 | Stop reason: HOSPADM

## 2023-04-23 RX ORDER — ONDANSETRON 2 MG/ML
4 INJECTION INTRAMUSCULAR; INTRAVENOUS EVERY 6 HOURS PRN
Status: DISCONTINUED | OUTPATIENT
Start: 2023-04-23 | End: 2023-04-25 | Stop reason: HOSPADM

## 2023-04-23 RX ORDER — SODIUM CHLORIDE AND POTASSIUM CHLORIDE 150; 900 MG/100ML; MG/100ML
75 INJECTION, SOLUTION INTRAVENOUS CONTINUOUS
Status: DISCONTINUED | OUTPATIENT
Start: 2023-04-23 | End: 2023-04-23

## 2023-04-23 RX ORDER — DABIGATRAN ETEXILATE 150 MG/1
150 CAPSULE ORAL EVERY 12 HOURS SCHEDULED
Status: DISCONTINUED | OUTPATIENT
Start: 2023-04-23 | End: 2023-04-25 | Stop reason: HOSPADM

## 2023-04-23 RX ORDER — ACETAMINOPHEN 325 MG/1
650 TABLET ORAL EVERY 6 HOURS PRN
Status: DISCONTINUED | OUTPATIENT
Start: 2023-04-23 | End: 2023-04-25 | Stop reason: HOSPADM

## 2023-04-23 RX ADMIN — MEMANTINE 10 MG: 5 TABLET ORAL at 18:00

## 2023-04-23 RX ADMIN — METOPROLOL SUCCINATE 12.5 MG: 25 TABLET, EXTENDED RELEASE ORAL at 08:25

## 2023-04-23 RX ADMIN — POTASSIUM CHLORIDE 40 MEQ: 1500 TABLET, EXTENDED RELEASE ORAL at 11:45

## 2023-04-23 RX ADMIN — POTASSIUM CHLORIDE 20 MEQ: 14.9 INJECTION, SOLUTION INTRAVENOUS at 11:07

## 2023-04-23 RX ADMIN — PRAZOSIN HYDROCHLORIDE 1 MG: 1 CAPSULE ORAL at 21:09

## 2023-04-23 RX ADMIN — ACETAMINOPHEN 325MG 650 MG: 325 TABLET ORAL at 08:24

## 2023-04-23 RX ADMIN — DEXAMETHASONE 4 MG: 4 TABLET ORAL at 23:01

## 2023-04-23 RX ADMIN — HYDROCHLOROTHIAZIDE 25 MG: 25 TABLET ORAL at 08:24

## 2023-04-23 RX ADMIN — PRAZOSIN HYDROCHLORIDE 1 MG: 1 CAPSULE ORAL at 02:22

## 2023-04-23 RX ADMIN — CEFEPIME HYDROCHLORIDE 2000 MG: 2 INJECTION, POWDER, FOR SOLUTION INTRAVENOUS at 18:00

## 2023-04-23 RX ADMIN — DEXAMETHASONE 4 MG: 4 TABLET ORAL at 11:46

## 2023-04-23 RX ADMIN — DABIGATRAN ETEXILATE MESYLATE 150 MG: 150 CAPSULE ORAL at 21:08

## 2023-04-23 RX ADMIN — DEXAMETHASONE 4 MG: 4 TABLET ORAL at 02:21

## 2023-04-23 RX ADMIN — MEMANTINE 10 MG: 5 TABLET ORAL at 08:26

## 2023-04-23 RX ADMIN — PANTOPRAZOLE SODIUM 40 MG: 40 TABLET, DELAYED RELEASE ORAL at 06:15

## 2023-04-23 RX ADMIN — DABIGATRAN ETEXILATE MESYLATE 150 MG: 150 CAPSULE ORAL at 02:21

## 2023-04-23 RX ADMIN — CEFEPIME HYDROCHLORIDE 2000 MG: 2 INJECTION, POWDER, FOR SOLUTION INTRAVENOUS at 06:13

## 2023-04-23 RX ADMIN — DEXAMETHASONE 4 MG: 4 TABLET ORAL at 08:24

## 2023-04-23 RX ADMIN — DEXAMETHASONE 4 MG: 4 TABLET ORAL at 18:01

## 2023-04-23 RX ADMIN — POTASSIUM CHLORIDE 20 MEQ: 14.9 INJECTION, SOLUTION INTRAVENOUS at 08:27

## 2023-04-23 NOTE — ASSESSMENT & PLAN NOTE
· POA creatinine 1 94, increased from baseline of 0 90  · Patient received 1 25 L of fluids in ED, however patient appears to be getting fluid overloaded  · I/O, daily weights  · Continue to monitor BMP for improvement

## 2023-04-23 NOTE — PLAN OF CARE
Problem: SAFETY ADULT  Goal: Patient will remain free of falls  Description: INTERVENTIONS:  - Educate patient/family on patient safety including physical limitations  - Instruct patient to call for assistance with activity   - Consult OT/PT to assist with strengthening/mobility   - Keep Call bell within reach  - Keep bed low and locked with side rails adjusted as appropriate  - Keep care items and personal belongings within reach  - Initiate and maintain comfort rounds  - Make Fall Risk Sign visible to staff  - Apply yellow socks and bracelet for high fall risk patients  - Consider moving patient to room near nurses station  Outcome: Progressing  Goal: Maintain or return to baseline ADL function  Description: INTERVENTIONS:  -  Assess patient's ability to carry out ADLs; assess patient's baseline for ADL function and identify physical deficits which impact ability to perform ADLs (bathing, care of mouth/teeth, toileting, grooming, dressing, etc )  - Assess/evaluate cause of self-care deficits   - Assess range of motion  - Assess patient's mobility; develop plan if impaired  - Assess patient's need for assistive devices and provide as appropriate  - Encourage maximum independence but intervene and supervise when necessary  - Involve family in performance of ADLs  - Assess for home care needs following discharge   - Consider OT consult to assist with ADL evaluation and planning for discharge  - Provide patient education as appropriate  Outcome: Progressing  Goal: Maintains/Returns to pre admission functional level  Description: INTERVENTIONS:  - Perform BMAT or MOVE assessment daily    - Set and communicate daily mobility goal to care team and patient/family/caregiver     - Collaborate with rehabilitation services on mobility goals if consulted  - Out of bed for toileting  - Record patient progress and toleration of activity level   Outcome: Progressing     Problem: DISCHARGE PLANNING  Goal: Discharge to home or other facility with appropriate resources  Description: INTERVENTIONS:  - Identify barriers to discharge w/patient and caregiver  - Arrange for needed discharge resources and transportation as appropriate  - Identify discharge learning needs (meds, wound care, etc )  - Arrange for interpretive services to assist at discharge as needed  - Refer to Case Management Department for coordinating discharge planning if the patient needs post-hospital services based on physician/advanced practitioner order or complex needs related to functional status, cognitive ability, or social support system  Outcome: Progressing     Problem: Knowledge Deficit  Goal: Patient/family/caregiver demonstrates understanding of disease process, treatment plan, medications, and discharge instructions  Description: Complete learning assessment and assess knowledge base    Interventions:  - Provide teaching at level of understanding  - Provide teaching via preferred learning methods  Outcome: Progressing     Problem: MUSCULOSKELETAL - ADULT  Goal: Maintain or return mobility to safest level of function  Description: INTERVENTIONS:  - Assess patient's ability to carry out ADLs; assess patient's baseline for ADL function and identify physical deficits which impact ability to perform ADLs (bathing, care of mouth/teeth, toileting, grooming, dressing, etc )  - Assess/evaluate cause of self-care deficits   - Assess range of motion  - Assess patient's mobility  - Assess patient's need for assistive devices and provide as appropriate  - Encourage maximum independence but intervene and supervise when necessary  - Involve family in performance of ADLs  - Assess for home care needs following discharge   - Consider OT consult to assist with ADL evaluation and planning for discharge  - Provide patient education as appropriate  Outcome: Progressing  Goal: Maintain proper alignment of affected body part  Description: INTERVENTIONS:  - Support, maintain and protect limb and body alignment  - Provide patient/ family with appropriate education  Outcome: Progressing

## 2023-04-23 NOTE — ASSESSMENT & PLAN NOTE
· Patient currently requiring 2L, he reports he does not require oxygen at home   He reports mild SOB, but denies other symptoms such as chest pain, palpitations, diaphoresis, N/V, dizziness, or syncope  · Patient with some rales at the lung bases and pedal edema, possibly SOB secondary to fluid overload  · CXR official read without acute cardiopulmonary disease  · BNP 77  · Troponins mildly elevated 34->51->66  · EKG with no acute ischemic change, sinus tach   · MARTINA 3  · Monitor on telemetry  · Respiratory protocol, ween oxygen as tolerated  · Received 1 25L fluids in the ED  · Would consider possible diuresis with resolution of ARABELLA and sepsis

## 2023-04-23 NOTE — ED PROVIDER NOTES
Received sign out from prior team  Reviewed plan of care with them and will accept care of patient  Will follow up on labs and/or radiology, as well as dispo patient  Labs Reviewed   CBC AND DIFFERENTIAL - Abnormal       Result Value Ref Range Status    WBC 21 19 (*) 4 31 - 10 16 Thousand/uL Final    RBC 3 82 (*) 3 88 - 5 62 Million/uL Final    Hemoglobin 14 0  12 0 - 17 0 g/dL Final    Hematocrit 43 0  36 5 - 49 3 % Final     (*) 82 - 98 fL Final    MCH 36 6 (*) 26 8 - 34 3 pg Final    MCHC 32 6  31 4 - 37 4 g/dL Final    RDW 14 3  11 6 - 15 1 % Final    MPV 10 1  8 9 - 12 7 fL Final    Platelets 018  024 - 390 Thousands/uL Final   COMPREHENSIVE METABOLIC PANEL - Abnormal    Sodium 139  135 - 147 mmol/L Final    Potassium 3 2 (*) 3 5 - 5 3 mmol/L Final    Chloride 98  96 - 108 mmol/L Final    CO2 26  21 - 32 mmol/L Final    ANION GAP 15 (*) 4 - 13 mmol/L Final    BUN 21  5 - 25 mg/dL Final    Creatinine 1 94 (*) 0 60 - 1 30 mg/dL Final    Comment: Standardized to IDMS reference method    Glucose 137  65 - 140 mg/dL Final    Comment: If the patient is fasting, the ADA then defines impaired fasting glucose as > 100 mg/dL and diabetes as > or equal to 123 mg/dL  Calcium 9 3  8 4 - 10 2 mg/dL Final    AST 55 (*) 13 - 39 U/L Final    ALT 57 (*) 7 - 52 U/L Final    Comment: Specimen collection should occur prior to Sulfasalazine administration due to the potential for falsely depressed results  Alkaline Phosphatase 66  34 - 104 U/L Final    Total Protein 6 1 (*) 6 4 - 8 4 g/dL Final    Albumin 3 7  3 5 - 5 0 g/dL Final    Total Bilirubin 1 28 (*) 0 20 - 1 00 mg/dL Final    Comment: Use of this assay is not recommended for patients undergoing treatment with eltrombopag due to the potential for falsely elevated results  N-acetyl-p-benzoquinone imine (metabolite of Acetaminophen) will generate erroneously low results in samples for patients that have taken an overdose of Acetaminophen      eGFR 34 ml/min/1 73sq m Final    Narrative:     National Kidney Disease Foundation guidelines for Chronic Kidney Disease (CKD):     Stage 1 with normal or high GFR (GFR > 90 mL/min/1 73 square meters)    Stage 2 Mild CKD (GFR = 60-89 mL/min/1 73 square meters)    Stage 3A Moderate CKD (GFR = 45-59 mL/min/1 73 square meters)    Stage 3B Moderate CKD (GFR = 30-44 mL/min/1 73 square meters)    Stage 4 Severe CKD (GFR = 15-29 mL/min/1 73 square meters)    Stage 5 End Stage CKD (GFR <15 mL/min/1 73 square meters)  Note: GFR calculation is accurate only with a steady state creatinine   LACTIC ACID, PLASMA (W/REFLEX IF RESULT > 2 0) - Abnormal    LACTIC ACID 5 3 (*) 0 5 - 2 0 mmol/L Final    Narrative:     Result may be elevated if tourniquet was used during collection  PROCALCITONIN TEST - Abnormal    Procalcitonin 1 19 (*) <=0 25 ng/ml Final    Comment: Suspected Lower Respiratory Tract Infection (LRTI):  - LESS than or EQUAL to 0 25 ng/mL:   low likelihood for bacterial LRTI; antibiotics DISCOURAGED   - GREATER than 0 25 ng/mL:   increased likelihood for bacterial LRTI; antibiotics ENCOURAGED  Suspected Sepsis:  - Strongly consider initiating antibiotics in ALL UNSTABLE patients  - LESS than or EQUAL to 0 5 ng/mL:   low likelihood for bacterial sepsis; antibiotics DISCOURAGED   - GREATER than 0 5 ng/mL:   increased likelihood for bacterial sepsis; antibiotics ENCOURAGED   - GREATER than 2 ng/mL:   high risk for severe sepsis / septic shock; antibiotics strongly ENCOURAGED  Decisions on antibiotic use should not be based solely on Procalcitonin (PCT) levels  If PCT is low but uncertainty exists with stopping antibiotics, repeat PCT in 6-24 hours to confirm the low level   If antibiotics are administered (regardless if initial PCT was high or low), repeat PCT every 1-2 days to consider early antibiotic cessation (when GREATER than 80% decrease from the peak OR when PCT drops below designated cutoffs, whichever comes first), so long as the infection is NOT one that typically requires prolonged treatment durations (e g , bone/joint infections, endocarditis, Staph  aureus bacteremia)      Situations of FALSE-POSITIVE Procalcitonin values:  1) Newborns < 67 hours old  2) Massive stress from severe trauma / burns, major surgery, acute pancreatitis, cardiogenic / hemorrhagic shock, sickle cell crisis, or other organ perfusion abnormalities  3) Malaria and some Candidal infections  4) Treatment with agents that stimulate cytokines (e g , OKT3, anti-lymphocyte globulins, alemtuzumab, IL-2, granulocyte transfusion [NOT GCSFs])  5) Chronic renal disease causes elevated baseline levels (consider GREATER than 0 75 ng/mL as an abnormal cut-off); initiating HD/CRRT may cause transient decreases  6) Paraneoplastic syndromes from medullary thyroid or SCLC, some forms of vasculitis, and acute [id number] disease    Situations of FALSE-NEGATIVE Procalcitonin values:  1) Too early in clinical course for PCT to have reached its peak (may repeat in 6-24 hours to confirm low level)  2) Localized infection WITHOUT systemic (SIRS / sepsis) response (e g , an abscess, osteomyelitis, cystitis)  3) Mycobacteria (e g , Tuberculosis, MAC)  4) Cystic fibrosis exacerbations     PROTIME-INR - Abnormal    Protime 14 8 (*) 11 6 - 14 5 seconds Final    INR 1 16  0 84 - 1 19 Final   UA W REFLEX TO MICROSCOPIC WITH REFLEX TO CULTURE - Abnormal    Color, UA Yellow   Final    Clarity, UA Slightly Cloudy   Final    Specific Gravity, UA <=1 005  1 003 - 1 030 Final    pH, UA 5 5  4 5, 5 0, 5 5, 6 0, 6 5, 7 0, 7 5, 8 0 Final    Leukocytes, UA Negative  Negative Final    Nitrite, UA Negative  Negative Final    Protein, UA 30 (1+) (*) Negative mg/dl Final    Glucose, UA Negative  Negative mg/dl Final    Ketones, UA Negative  Negative mg/dl Final    Urobilinogen, UA 0 2  0 2, 1 0 E U /dl E U /dl Final    Bilirubin, UA Negative  Negative Final    Occult Blood, UA "Trace-Intact (*) Negative Final   HS TROPONIN I 2HR - Abnormal    hs TnI 2hr 51 (*) \"Refer to ACS Flowchart\"- see link ng/L Final    Comment:                                              Initial (time 0) result  If >=50 ng/L, Myocardial injury suggested ;  Type of myocardial injury and treatment strategy  to be determined  If 5-49 ng/L, a delta result at 2 hours and or 4 hours will be needed to further evaluate  If <4 ng/L, and chest pain has been >3 hours since onset, patient may qualify for discharge based on the HEART score in the ED  If <5 ng/L and <3hours since onset of chest pain, a delta result at 2 hours will be needed to further evaluate  HS Troponin 99th Percentile URL of a Health Population=12 ng/L with a 95% Confidence Interval of 8-18 ng/L  Second Troponin (time 2 hours)  If calculated delta >= 20 ng/L,  Myocardial injury suggested ; Type of myocardial injury and treatment strategy to be determined  If 5-49 ng/L and the calculated delta is 5-19 ng/L, consult medical service for evaluation  Continue evaluation for ischemia on ecg and other possible etiology and repeat hs troponin at 4 hours  If delta is <5 ng/L at 2 hours, consider discharge based on risk stratification via the HEART score (if in ED), or MARTINA risk score in IP/Observation  HS Troponin 99th Percentile URL of a Health Population=12 ng/L with a 95% Confidence Interval of 8-18 ng/L      Delta 2hr hsTnI 17  <20 ng/L Final   URINE MICROSCOPIC - Abnormal    RBC, UA 0-1 (*) None Seen, 2-4 /hpf Final    WBC, UA 2-4  None Seen, 2-4, 5-60 /hpf Final    Epithelial Cells Occasional  None Seen, Occasional /hpf Final    Bacteria, UA Occasional  None Seen, Occasional /hpf Final   MANUAL DIFFERENTIAL(PHLEBS DO NOT ORDER) - Abnormal    Segmented % 89 (*) 43 - 75 % Final    Lymphocytes % 2 (*) 14 - 44 % Final    Monocytes % 8  4 - 12 % Final    Eosinophils, % 1  0 - 6 % Final    Basophils % 0  0 - 1 % Final    Absolute Neutrophils 18 86 (*) " 1 85 - 7 62 Thousand/uL Final    Lymphocytes Absolute 0 42 (*) 0 60 - 4 47 Thousand/uL Final    Monocytes Absolute 1 70 (*) 0 00 - 1 22 Thousand/uL Final    Eosinophils Absolute 0 21  0 00 - 0 40 Thousand/uL Final    Basophils Absolute 0 00  0 00 - 0 10 Thousand/uL Final    Total Counted     Final    Macrocytes Present   Final    Polychromasia Present   Final    Platelet Estimate Adequate  Adequate Final   COVID19, INFLUENZA A/B, RSV PCR, [facility] - Normal    SARS-CoV-2 Negative  Negative Final    Comment:      INFLUENZA A PCR Negative  Negative Final    Comment:      INFLUENZA B PCR Negative  Negative Final    Comment:      RSV PCR Negative  Negative Final    Comment:      Narrative:     FOR PEDIATRIC PATIENTS - copy/paste COVID Guidelines URL to browser: [email or web address]  [email or web address]    SARS-CoV-2 assay is a Nucleic Acid Amplification assay intended for the  qualitative detection of nucleic acid from SARS-CoV-2 in nasopharyngeal  swabs  Results are for the presumptive identification of SARS-CoV-2 RNA  Positive results are indicative of infection with SARS-CoV-2, the virus  causing COVID-19, but do not rule out bacterial infection or co-infection  with other viruses  Laboratories within the [place] and its  territories are required to report all positive results to the appropriate  public health authorities  Negative results do not preclude SARS-CoV-2  infection and should not be used as the sole basis for treatment or other  patient management decisions  Negative results must be combined with  clinical observations, patient history, and epidemiological information  This test has not been FDA cleared or approved  This test has been authorized by FDA under an Emergency Use Authorization  (EUA)   This test is only authorized for the duration of time the  declaration that circumstances exist justifying the authorization of the  emergency use of an in vitro "diagnostic tests for detection of SARS-CoV-2  virus and/or diagnosis of COVID-19 infection under section 564(b)(1) of  the Act, 21 U  S C  714AGA-4(M)(2), unless the authorization is terminated  or revoked sooner  The test has been validated but independent review by FDA  and CLIA is pending  Test performed using [employer] Gene[employer]pert: This RT-PCR assay targets N2,  a region unique to SARS-CoV-2  A conserved region in the E-gene was chosen  for pan-Sarbecovirus detection which includes SARS-CoV-2  According to CMS-2020-01-R, this platform meets the definition of high-throughput technology  APTT - Normal    PTT 23  23 - 37 seconds Final    Comment: Therapeutic Heparin Range =  60-90 seconds   HS TROPONIN I 0HR - Normal    hs TnI 0hr 34  \"Refer to ACS Flowchart\"- see link ng/L Final    Comment:                                              Initial (time 0) result  If >=50 ng/L, Myocardial injury suggested ;  Type of myocardial injury and treatment strategy  to be determined  If 5-49 ng/L, a delta result at 2 hours and or 4 hours will be needed to further evaluate  If <4 ng/L, and chest pain has been >3 hours since onset, patient may qualify for discharge based on the HEART score in the ED  If <5 ng/L and <3hours since onset of chest pain, a delta result at 2 hours will be needed to further evaluate  HS Troponin 99th Percentile URL of a Health Population=12 ng/L with a 95% Confidence Interval of 8-18 ng/L  Second Troponin (time 2 hours)  If calculated delta >= 20 ng/L,  Myocardial injury suggested ; Type of myocardial injury and treatment strategy to be determined  If 5-49 ng/L and the calculated delta is 5-19 ng/L, consult medical service for evaluation  Continue evaluation for ischemia on ecg and other possible etiology and repeat hs troponin at 4 hours    If delta is <5 ng/L at 2 hours, consider discharge based on risk stratification via the HEART score (if in ED), or MARTINA risk score in " IP/Observation  HS Troponin 99th Percentile URL of a Health Population=12 ng/L with a 95% Confidence Interval of 8-18 ng/L    B-TYPE NATRIURETIC PEPTIDE (BNP) - Normal    BNP 77  0 - 100 pg/mL Final   LACTIC ACID 2 HOUR - Normal    LACTIC ACID 1 4  0 5 - 2 0 mmol/L Final    Narrative:     Result may be elevated if tourniquet was used during collection  BLOOD CULTURE   BLOOD CULTURE   STOOL ENTERIC BACTERIAL PANEL BY PCR   CLOSTRIDIUM DIFFICILE TOXIN BY PCR WITH EIA   HS TROPONIN I 4HR     CT chest abdomen pelvis w contrast   Final Result      No acute findings in the chest, abdomen or pelvis  Resolved descending colonic diverticulitis without new bowel findings  Multiple subtle enhancing lesions throughout the liver in keeping with numerous hepatic metastases  Known pulmonary and retroperitoneal metastases  Unchanged right iliac chain and inguinal adenopathy in this patient with metastatic melanoma  Known osseous metastases are not well visualized on this study         The study was marked in EPIC for immediate notification  Workstation performed: [id number]         XR chest 1 view portable   Final Result      No acute cardiopulmonary disease  Workstation performed: [id number]           Time reflects when diagnosis was documented in both MDM as applicable and the Disposition within this note     Time User Action Codes Description Comment    4/22/2023 10:15 PM Kaushal Tucker Add [R19 7] Diarrhea     4/22/2023 10:16 PM Kaushal FITZPATRICK Add [E87 20] Lactic acidosis     4/22/2023 10:16 PM Kaushal Tucker Add [E06 057] Leukocytosis       ED Disposition     ED Disposition   Admit    Condition   Stable    Date/Time   Sat Apr 22, 2023 10:15 PM    Comment   Case was discussed with [facility] and the patient's admission status was agreed to be Admission Status: inpatient status to the service of Dr Bethany Santiago              Follow-up Information    None Shazia Hendrix,   04/22/23 2211

## 2023-04-23 NOTE — ASSESSMENT & PLAN NOTE
"· Arrived to ED via EMS with stool incontinence, reported recurrence of diarrhea since last admission earlier in the month  o Patient recently admitted for acute diverticulitis 4/11-4/14, now resolved on CT  · Patient presented to the ED fulfilling septic criteria with tachycardia, tachypnea, and leukocytosis of 21 19    Septic source:   o Likely secondary to gastroenteritis, possible c diff, stool cultures pending  - CT chest/abdomen/pelvis demonstrating \"No acute findings in the chest, abdomen or pelvis  Resolved descending colonic diverticulitis without new bowel findings   \"  o Viral panel negative  o UA without evidence of infection  o CXR with no acute cardiopulmonary abnormalities  o Blood cultures pending    Lactic acid 5 3-> 1 4   Procalc 1 19, repeat in AM   Continue supportive care, patient received 1 25L fluids in the ED, hold further fluids as patient appears fluid overloaded    Cefepime initiated in ED, continue for now     "

## 2023-04-23 NOTE — ASSESSMENT & PLAN NOTE
· Malignant melanoma with brain mets  · Undergoing whole brain radiation with p o  chemotherapy  · On chronic steroid therapy for hemolytic anemia  · Followed outpatient by [facility] hem/onc

## 2023-04-23 NOTE — SEPSIS NOTE
"  Sepsis Note   Charlene Bellamy 70 y o  male MRN: [id number]  Unit/Bed#: E2 -01 Encounter: [id number]       Initial Sepsis Screening     Row Name 04/22/23 1716                Is the patient's history suggestive of a new or worsening infection? Yes (Proceed)  -[address]        Suspected source of infection pneumonia;acute abdominal infection;suspect infection, source unknown  -KH        Indicate SIRS criteria Tachycardia > 90 bpm;Tachypnea > 20 resp per min  -KH        Are two or more of the above signs & symptoms of infection both present and new to the patient? Yes (Proceed)  -KH        Assess for evidence of organ dysfunction: Are any of the below criteria present within 6 hours of suspected infection and SIRS criteria that are NOT considered to be chronic conditions? --              User Key  (r) = Recorded By, (t) = Taken By, (c) = Cosigned By    [address] [place] [address] Name Provider Type    [address] [place] [address],  Physician                Default Flowsheet Data (last 720 hours)     Sepsis Reassess     Row Name 04/23/23 0146                   Repeat Volume Status and Tissue Perfusion Assessment Performed    Date of Reassessment: 04/23/23  -NK        Time of Reassessment: 0100 -NK        Sepsis Reassessment Note: Click \"NEXT\" below (NOT \"close\") to generate sepsis reassessment note  YES (proceed by clicking \"NEXT\")  -NK        Repeat Volume Status and Tissue Perfusion Assessment Performed --              User Key  (r) = Recorded By, (t) = Taken By, (c) = Cosigned By    [address] [place] [address] Name Provider Type    Alcira De Leon PA-C Physician Assistant                Body mass index is 28 86 kg/m²    Wt Readings from Last 1 Encounters:   04/22/23 86 1 kg (189 lb 13 1 oz)     IBW (Ideal Body Weight): 68 4 kg    Ideal body weight: 68 4 kg (150 lb 12 7 oz)  Adjusted ideal body weight: 75 5 kg (166 lb 6 5 oz)  "

## 2023-04-23 NOTE — H&P
"[address] [place] [address]  H&P  Name: Rashi Silver 70 y o  male I MRN: [id number]  Unit/Bed#: E2 -01 I Date of Admission: 4/22/2023   Date of Service: 4/23/2023 I Hospital Day: 1      Assessment/Plan   * Sepsis [facility])  Assessment & Plan  · Arrived to ED via EMS with stool incontinence, reported recurrence of diarrhea since last admission earlier in the month  o Patient recently admitted for acute diverticulitis 4/11-4/14, now resolved on CT  · Patient presented to the ED fulfilling septic criteria with tachycardia, tachypnea, and leukocytosis of 21 19    Septic source:   o Likely secondary to gastroenteritis, possible c diff, stool cultures pending  - CT chest/abdomen/pelvis demonstrating \"No acute findings in the chest, abdomen or pelvis  Resolved descending colonic diverticulitis without new bowel findings  \"  o Viral panel negative  o UA without evidence of infection  o CXR with no acute cardiopulmonary abnormalities  o Blood cultures pending    Lactic acid 5 3-> 1 4   Procalc 1 19, repeat in AM   Continue supportive care, patient received 1 25L fluids in the ED, hold further fluids as patient appears fluid overloaded    Cefepime initiated in ED, continue for now       Shortness of breath  Assessment & Plan  · Patient currently requiring 2L, he reports he does not require oxygen at home   He reports mild SOB, but denies other symptoms such as chest pain, palpitations, diaphoresis, N/V, dizziness, or syncope  · Patient with some rales at the lung bases and pedal edema, possibly SOB secondary to fluid overload  · CXR official read without acute cardiopulmonary disease  · BNP 77  · Troponins mildly elevated 34->51->66  · EKG with no acute ischemic change, sinus tach   · MARTINA 3  · Monitor on telemetry  · Respiratory protocol, ween oxygen as tolerated  · Received 1 25L fluids in the ED  · Would consider possible diuresis with resolution of ARABELLA and sepsis    ARABELLA (acute kidney injury) " "(HCC)  Assessment & Plan  · POA creatinine 1 94, increased from baseline of 0 90  · Patient received 1 25 L of fluids in ED, however patient appears to be getting fluid overloaded  · I/O, daily weights  · Continue to monitor BMP for improvement    Personal history of malignant melanoma  Assessment & Plan  · Malignant melanoma with brain mets  · Undergoing whole brain radiation with p o  chemotherapy  · On chronic steroid therapy for hemolytic anemia  · Followed outpatient by [facility]/onc    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  Assessment & Plan  · Baseline hemoglobin 12-14  · Currently 14 0  · Patient gets weekly CBC for close monitoring with Dr Nas Avina  · Continue dexamethasone   · Continue monitoring CBC    Pulmonary embolism with acute cor pulmonale (HCC)  Assessment & Plan  · Anticoagulated on Pradaxa         VTE Pharmacologic Prophylaxis: VTE Score: 7 High Risk (Score >/= 5) - Pharmacological DVT Prophylaxis Ordered: dabigatran (Pradaxa)  Sequential Compression Devices Ordered  Code Status: Level 1 - Full Code   Discussion with family: Update in AM      Anticipated Length of Stay: Patient will be admitted on an inpatient basis with an anticipated length of stay of greater than 2 midnights secondary to sepsis  Total Time Spent on Date of Encounter in care of patient: 75 minutes This time was spent on one or more of the following: performing physical exam; counseling and coordination of care; obtaining or reviewing history; documenting in the medical record; reviewing/ordering tests, medications or procedures; communicating with other healthcare professionals and discussing with patient's family/caregivers  Chief Complaint: \"I am very weak\"    History of Present Illness:  Oli Figueroa is a 70 y o  male who presents with sepsis  Patient represented to the ED via EMS after having a recurrence of diarrhea    EMS reported that patient was incontinent of diarrhea at home leading him to call EMS " "services  On arrival, patient was covered in diarrhea  Patient recently admitted earlier this month for diarrhea and diverticulitis and placed on antibiotics at that time  History limited from patient secondary to some memory issues secondary to brain mets  He reports that his primary complaints at this time or that he is generally weak, feels short of breath, and \"we pumped him full of fluids and he is swollen now  \"  Patient reports pedal edema which he states is improved from earlier in the week but still much worse than his baseline  He reports that he normally does not have issues with leg swelling  He states that he is not on oxygen at home  Patient reports that he did not think he had any more diarrhea from last week and did not remember the events earlier in the evening which brought him into the emergency department  Patient denies any other complaints such as chest pain, palpitations, nausea vomiting, abdominal pain, bloody stools, dizziness, lightheadedness, myalgias, fevers, or syncope  He reports that he believes he finishes antibiotics since he was discharged  Review of Systems:  Review of Systems   Constitutional: Positive for fatigue  Negative for appetite change, chills, diaphoresis and fever  HENT: Negative for congestion, rhinorrhea and sore throat  Eyes: Negative for photophobia and visual disturbance  Respiratory: Positive for shortness of breath  Negative for cough and wheezing  Cardiovascular: Positive for leg swelling  Negative for chest pain and palpitations  Gastrointestinal: Positive for diarrhea  Negative for abdominal distention, abdominal pain, blood in stool, constipation, nausea and vomiting  Genitourinary: Negative for dysuria and hematuria  Musculoskeletal: Negative for arthralgias, back pain, myalgias and neck stiffness  Skin: Negative for color change and rash  Neurological: Positive for weakness (generalized)   Negative for dizziness, seizures, " syncope, light-headedness and headaches  Psychiatric/Behavioral: Positive for confusion (mild, secondary to brain mets)  Negative for agitation and behavioral problems  The patient is not nervous/anxious  All other systems reviewed and are negative        Past Medical and Surgical History:   Past Medical History:   Diagnosis Date    Anemia 11/02/2016    Anxiety     Arthritis     Autoimmune hemolytic anemia (HCC)     Claustrophobia     COVID 12/2020    DVT (deep venous thrombosis) (HCC)     GERD (gastroesophageal reflux disease)     Hearing loss, right     Hemolytic anemia (HCC)     History of transfusion     2018 - no adverse reaction    Hypertension     Malignant melanoma of leg, right ([address] ) 07/01/2022    Palpitation     Portal vein thrombosis     PTSD (post-traumatic stress disorder)     Pulmonary emboli (HCC)     Squamous cell skin cancer 12/20/2022    SCCIS- 12/6/22    Tobacco abuse        Past Surgical History:   Procedure Laterality Date    CATARACT EXTRACTION Bilateral     CHOLECYSTECTOMY  07/18/2017    COLONOSCOPY      ELBOW ARTHROPLASTY Left     bursectomy    IR BIOPSY RETROPERITONEUM  3/17/2023    IR DRAINAGE TUBE CHECK WITH SCLEROSIS  10/06/2022    IR DRAINAGE TUBE CHECK WITH SCLEROSIS  10/10/2022    IR DRAINAGE TUBE CHECK WITH SCLEROSIS  10/20/2022    IR DRAINAGE TUBE CHECK WITH SCLEROSIS  10/27/2022    IR DRAINAGE TUBE CHECK WITH SCLEROSIS  11/04/2022    IR DRAINAGE TUBE CHECK WITH SCLEROSIS  11/15/2022    IR DRAINAGE TUBE CHECK WITH SCLEROSIS  11/25/2022    IR DRAINAGE TUBE PLACEMENT  09/19/2022    JOINT REPLACEMENT Right 02/02/2021    knee    KNEE SURGERY Right     meniscus tear    LYMPH NODE BIOPSY Right 07/29/2022    Procedure: BIOPSY LYMPH NODE SENTINEL;  Surgeon: Serjio Cruz MD;  [facility];  Service: Surgical Oncology    MOHS SURGERY Right 12/20/2022    Right dorsal hand SCCIS-Dr Lars Coker    VA ARTHRP KNE CONDYLE&PLATU MEDIAL&LAT COMPARTMENTS Right 02/02/2021    Procedure: ARTHROPLASTY KNEE TOTAL;  Surgeon: Teodoro Bhatti MD;  [facility];  Service: Orthopedics    MS ARTHRP KNE CONDYLE&PLATU MEDIAL&LAT COMPARTMENTS Left 11/17/2021    Procedure: TOTAL KNEE REPLACEMENT;  Surgeon: Teodoro Bhatti MD;  [facility];  Service: Orthopedics    MS LAPS SURG Mayme Sipsey Virgen Hence N/A 12/23/2017    Procedure: CHOLECYSTECTOMY LAPAROSCOPIC with cholangiogram;  Surgeon: Lena Thomas MD;  [facility];  Service: General    MS SPLENECTOMY TOTAL SEPARATE PROCEDURE N/A 05/18/2017    Procedure: LAPAROSCOPIC HAND ASSIST SPLENECTOMY;  Surgeon: Annabel Carvajal MD;  [facility];  Service: Surgical Oncology    SHOULDER SURGERY Left     rotator cuff x4, reconstruction    SKIN BIOPSY  5 May 2022   [facility] SKIN CANCER EXCISION  29 July 2022    SKIN LESION EXCISION Right 07/29/2022    Procedure: WIDE EXCISION RIGHT MEDIAL THIGH;  Surgeon: Sandhya Grayson MD;  [facility];  Service: Surgical Oncology       Meds/Allergies:  Prior to Admission medications    Medication Sig Start Date End Date Taking?  Authorizing Provider   acetaminophen (TYLENOL) 325 mg tablet Take 2 tablets (650 mg total) by mouth every 6 (six) hours as needed for mild pain 2/3/21  Yes Sandy Shaver PA-C   ALPRAZolam Miguel Parcel) 0 5 mg tablet Take 1 tablet (0 5 mg total) by mouth 2 (two) times a day Take one tablet one hour before scan and bring the second one with you to take right before the MRI if needed 3/3/23  Yes Daphne Lee MD   dabigatran etexilate (PRADAXA) 150 mg capsu Take 1 capsule (150 mg total) by mouth every 12 (twelve) hours 8/4/22  Yes Wilbert Norris MD   dexamethasone (DECADRON) 4 mg tablet Take 1 tablet (4 mg total) by mouth every 8 (eight) hours Please make sure you're on Protonix 40 mg daily for GI prophylaxis 4/17/23  Yes Daphne Lee MD   hydrochlorothiazide (HYDRODIURIL) 25 mg tablet Take 1 tablet (25 mg total) by mouth daily 2/15/21  Yes Lillie Ramirez DO   memantine (Namenda) 10 mg tablet Take 1 tablet (10 mg total) by mouth 2 (two) times a day 4/18/23  Yes Dakota Rose MD   metoprolol succinate (TOPROL-XL) 25 mg 24 hr tablet Take 0 5 tablets (12 5 mg total) by mouth daily 1/4/21  Yes Lillie Ramirez DO   ondansetron [facility]) 4 mg tablet Take 1 tablet (4 mg total) by mouth every 8 (eight) hours as needed for nausea or vomiting 11/17/22  Yes Karolina Jamison MD   pantoprazole (PROTONIX) 40 mg tablet Take 1 tablet (40 mg total) by mouth daily 3/4/23  Yes Mary Goldman DO   potassium chloride (K-DUR,KLOR-CON) 20 mEq tablet Take 1 tablet (20 mEq total) by mouth daily 12/29/20  Yes Lillie Ramirez DO   prazosin (MINIPRESS) 1 mg capsule Take 1 mg by mouth daily at bedtime  12/8/20  Yes Historical Provider, MD   VITAMIN D PO Take 1,000 Units by mouth daily    Yes Historical Provider, MD   ascorbic acid (VITAMIN C) 1000 MG tablet Take 1 tablet (1,000 mg total) by mouth every 12 (twelve) hours for 6 doses  Patient taking differently: Take 1,000 mg by mouth daily Every other day 12/22/20 12/6/22  Fox Moore MD   memantine [facility] TITRATION PACK) Follow package directions and titration schedule reviewed at consultation  Call Radiation Oncology with questions  3/7/23   Dakota Rose MD   pantoprazole (PROTONIX) 40 mg tablet Take 1 tablet (40 mg total) by mouth daily 8/27/20   Lillie Ramirez DO   sodium chloride, PF, 0 9 % 10 mL by Intracatheter route daily Intracatheter flushing daily   May substitute prefilled syringe with normal saline 10 mL vials, 10 mL syringes, and 18 g blunt needles 9/19/22 12/18/22  Gloria Kinney MD   sulfamethoxazole-trimethoprim (BACTRIM DS) 800-160 mg per tablet Take 1 tablet by mouth 3 (three) times a week Monday, Wednesday and Friday  Patient not taking: Reported on 4/22/2023 8/5/22 4/30/25  Delilah Amend, MD     I have reviewed home medications using recent Epic "encounter  Allergies: No Known Allergies    Social History:  Marital Status: /Civil Union   Patient Pre-hospital Living Situation: Home  Patient Pre-hospital Level of Mobility: walks  Patient Pre-hospital Diet Restrictions: None  Substance Use History:   Social History     Substance and Sexual Activity   Alcohol Use Yes    Alcohol/week: 6 0 standard drinks    Types: 6 Cans of beer per week    Comment: socially     Social History     Tobacco Use   Smoking Status Some Days    Packs/day: 0 00    Years: 5 00    Pack years: 0 00    Types: Cigars, Cigarettes   Smokeless Tobacco Current    Types: Snuff   Tobacco Comments    5  a week average     Social History     Substance and Sexual Activity   Drug Use Yes    Types: Marijuana    Comment: medical marijuana       Family History:  Family History   Problem Relation Age of Onset    Cancer Mother     Breast cancer Mother     Other Father         CABG    Heart attack Paternal Grandfather         acute MI       Physical Exam:     Vitals:   Blood Pressure: 138/78 (04/22/23 2335)  Pulse: 78 (04/22/23 2335)  Temperature: 97 7 °F (36 5 °C) (04/22/23 2335)  Temp Source: Temporal (04/22/23 2335)  Respirations: 20 (04/22/23 2335)  Height: 5' 8\" (172 7 cm) (04/22/23 2335)  Weight - Scale: 86 1 kg (189 lb 13 1 oz) (04/22/23 2335)  SpO2: 98 % (04/22/23 2335)    Physical Exam  Vitals and nursing note reviewed  Constitutional:       General: He is not in acute distress  Appearance: He is well-developed  He is ill-appearing and diaphoretic  HENT:      Head: Normocephalic and atraumatic  Nose: Nose normal  No congestion  Mouth/Throat:      Mouth: Mucous membranes are moist       Pharynx: Oropharynx is clear  Eyes:      Conjunctiva/sclera: Conjunctivae normal    Cardiovascular:      Rate and Rhythm: Normal rate and regular rhythm  Heart sounds: Normal heart sounds  No murmur heard  No friction rub  No gallop     Pulmonary:      Effort: Pulmonary " effort is normal  No respiratory distress  Breath sounds: Rales (bilateral bases) present  No wheezing or rhonchi  Comments: On 2L NC  Abdominal:      General: Bowel sounds are normal  There is no distension  Palpations: Abdomen is soft  Tenderness: There is no abdominal tenderness  Musculoskeletal:      Cervical back: Neck supple  Right lower leg: Edema (3+ pitting edema) present  Left lower leg: Edema (3+ pitting edema) present  Skin:     General: Skin is warm  Capillary Refill: Capillary refill takes less than 2 seconds  Neurological:      General: No focal deficit present  Mental Status: He is alert and oriented to person, place, and time  Mental status is at baseline        Comments: Oriented x 3, patient unable to recall situation which brought him to hospital unprompted   Psychiatric:         Mood and Affect: Mood normal          Behavior: Behavior normal           Additional Data:     Lab Results:  Results from last 7 days   Lab Units 04/22/23  1732   WBC Thousand/uL 21 19*   HEMOGLOBIN g/dL 14 0   HEMATOCRIT % 43 0   PLATELETS Thousands/uL 355   LYMPHO PCT % 2*   MONO PCT % 8   EOS PCT % 1     Results from last 7 days   Lab Units 04/22/23  1732   SODIUM mmol/L 139   POTASSIUM mmol/L 3 2*   CHLORIDE mmol/L 98   CO2 mmol/L 26   BUN mg/dL 21   CREATININE mg/dL 1 94*   ANION GAP mmol/L 15*   CALCIUM mg/dL 9 3   ALBUMIN g/dL 3 7   TOTAL BILIRUBIN mg/dL 1 28*   ALK PHOS U/L 66   ALT U/L 57*   AST U/L 55*   GLUCOSE RANDOM mg/dL 137     Results from last 7 days   Lab Units 04/22/23  1734   INR  1 16             Results from last 7 days   Lab Units 04/22/23  1934 04/22/23  1732   LACTIC ACID mmol/L 1 4 5 3*   PROCALCITONIN ng/ml  --  1 19*       Lines/Drains:  Invasive Devices     Peripheral Intravenous Line  Duration           Peripheral IV 04/22/23 Left Antecubital <1 day                    Imaging: Reviewed radiology reports from this admission including: chest xray, chest CT scan and abdominal/pelvic CT  CT chest abdomen pelvis w contrast   Final Result by Mookie Valle MD (04/22 2033)      No acute findings in the chest, abdomen or pelvis  Resolved descending colonic diverticulitis without new bowel findings  Multiple subtle enhancing lesions throughout the liver in keeping with numerous hepatic metastases  Known pulmonary and retroperitoneal metastases  Unchanged right iliac chain and inguinal adenopathy in this patient with metastatic melanoma  Known osseous metastases are not well visualized on this study         The study was marked in EPIC for immediate notification  Workstation performed: [id number]         XR chest 1 view portable   Final Result by Saleem Au MD (04/22 2034)      No acute cardiopulmonary disease  Workstation performed: [id number]             EKG and Other Studies Reviewed on Admission:   · EKG: Sinus Tachycardia    ** Please Note: This note has been constructed using a voice recognition system   **

## 2023-04-23 NOTE — PLAN OF CARE
Problem: SAFETY ADULT  Goal: Patient will remain free of falls  Description: INTERVENTIONS:  - Educate patient/family on patient safety including physical limitations  - Instruct patient to call for assistance with activity   - Consult OT/PT to assist with strengthening/mobility   - Keep Call bell within reach  - Keep bed low and locked with side rails adjusted as appropriate  - Keep care items and personal belongings within reach  - Initiate and maintain comfort rounds  - Make Fall Risk Sign visible to staff  - Offer Toileting every 2 Hours, in advance of need  - Initiate/Maintain bed alarm  - Obtain necessary fall risk management equipment  - Apply yellow socks and bracelet for high fall risk patients  - Consider moving patient to room near nurses station  Outcome: Progressing  Goal: Maintain or return to baseline ADL function  Description: INTERVENTIONS:  -  Assess patient's ability to carry out ADLs; assess patient's baseline for ADL function and identify physical deficits which impact ability to perform ADLs (bathing, care of mouth/teeth, toileting, grooming, dressing, etc )  - Assess/evaluate cause of self-care deficits   - Assess range of motion  - Assess patient's mobility; develop plan if impaired  - Assess patient's need for assistive devices and provide as appropriate  - Encourage maximum independence but intervene and supervise when necessary  - Involve family in performance of ADLs  - Assess for home care needs following discharge   - Consider OT consult to assist with ADL evaluation and planning for discharge  - Provide patient education as appropriate  Outcome: Progressing  Goal: Maintains/Returns to pre admission functional level  Description: INTERVENTIONS:  - Perform BMAT or MOVE assessment daily    - Set and communicate daily mobility goal to care team and patient/family/caregiver     - Collaborate with rehabilitation services on mobility goals if consulted  - Perform Range of Motion 3 times a day   - Reposition patient every 2 hours  - Dangle patient 3 times a day  - Stand patient 3 times a day  - Ambulate patient 3 times a day  - Out of bed to chair 3 times a day   - Out of bed for meals 3 times a day  - Out of bed for toileting  - Record patient progress and toleration of activity level   Outcome: Progressing     Problem: DISCHARGE PLANNING  Goal: Discharge to home or other facility with appropriate resources  Description: INTERVENTIONS:  - Identify barriers to discharge w/patient and caregiver  - Arrange for needed discharge resources and transportation as appropriate  - Identify discharge learning needs (meds, wound care, etc )  - Arrange for interpretive services to assist at discharge as needed  - Refer to Case Management Department for coordinating discharge planning if the patient needs post-hospital services based on physician/advanced practitioner order or complex needs related to functional status, cognitive ability, or social support system  Outcome: Progressing     Problem: Knowledge Deficit  Goal: Patient/family/caregiver demonstrates understanding of disease process, treatment plan, medications, and discharge instructions  Description: Complete learning assessment and assess knowledge base    Interventions:  - Provide teaching at level of understanding  - Provide teaching via preferred learning methods  Outcome: Progressing     Problem: MUSCULOSKELETAL - ADULT  Goal: Maintain or return mobility to safest level of function  Description: INTERVENTIONS:  - Assess patient's ability to carry out ADLs; assess patient's baseline for ADL function and identify physical deficits which impact ability to perform ADLs (bathing, care of mouth/teeth, toileting, grooming, dressing, etc )  - Assess/evaluate cause of self-care deficits   - Assess range of motion  - Assess patient's mobility  - Assess patient's need for assistive devices and provide as appropriate  - Encourage maximum independence but intervene and supervise when necessary  - Involve family in performance of ADLs  - Assess for home care needs following discharge   - Consider OT consult to assist with ADL evaluation and planning for discharge  - Provide patient education as appropriate  Outcome: Progressing  Goal: Maintain proper alignment of affected body part  Description: INTERVENTIONS:  - Support, maintain and protect limb and body alignment  - Provide patient/ family with appropriate education  Outcome: Progressing

## 2023-04-23 NOTE — ASSESSMENT & PLAN NOTE
· Baseline hemoglobin 12-14  · Currently 14 0  · Patient gets weekly CBC for close monitoring with Dr Yanet Jay  · Continue dexamethasone   · Continue monitoring CBC

## 2023-04-24 LAB
ALBUMIN SERPL BCP-MCNC: 2.8 G/DL (ref 3.5–5)
ALP SERPL-CCNC: 39 U/L (ref 34–104)
ALT SERPL W P-5'-P-CCNC: 29 U/L (ref 7–52)
ANION GAP SERPL CALCULATED.3IONS-SCNC: 6 MMOL/L (ref 4–13)
AST SERPL W P-5'-P-CCNC: 22 U/L (ref 13–39)
ATRIAL RATE: 103 BPM
ATRIAL RATE: 95 BPM
BASOPHILS # BLD AUTO: 0.03 THOUSANDS/ΜL (ref 0–0.1)
BASOPHILS NFR BLD AUTO: 0 % (ref 0–1)
BILIRUB SERPL-MCNC: 0.54 MG/DL (ref 0.2–1)
BUN SERPL-MCNC: 24 MG/DL (ref 5–25)
CALCIUM ALBUM COR SERPL-MCNC: 9.3 MG/DL (ref 8.3–10.1)
CALCIUM SERPL-MCNC: 8.3 MG/DL (ref 8.4–10.2)
CHLORIDE SERPL-SCNC: 104 MMOL/L (ref 96–108)
CO2 SERPL-SCNC: 26 MMOL/L (ref 21–32)
CREAT SERPL-MCNC: 1.18 MG/DL (ref 0.6–1.3)
EOSINOPHIL # BLD AUTO: 0.01 THOUSAND/ΜL (ref 0–0.61)
EOSINOPHIL NFR BLD AUTO: 0 % (ref 0–6)
ERYTHROCYTE [DISTWIDTH] IN BLOOD BY AUTOMATED COUNT: 13.9 % (ref 11.6–15.1)
GFR SERPL CREATININE-BSD FRML MDRD: 62 ML/MIN/1.73SQ M
GLUCOSE SERPL-MCNC: 162 MG/DL (ref 65–140)
HCT VFR BLD AUTO: 32.6 % (ref 36.5–49.3)
HGB BLD-MCNC: 10.5 G/DL (ref 12–17)
IMM GRANULOCYTES # BLD AUTO: 0.3 THOUSAND/UL (ref 0–0.2)
IMM GRANULOCYTES NFR BLD AUTO: 2 % (ref 0–2)
LYMPHOCYTES # BLD AUTO: 1.14 THOUSANDS/ΜL (ref 0.6–4.47)
LYMPHOCYTES NFR BLD AUTO: 6 % (ref 14–44)
MAGNESIUM SERPL-MCNC: 1.8 MG/DL (ref 1.9–2.7)
MCH RBC QN AUTO: 36.2 PG (ref 26.8–34.3)
MCHC RBC AUTO-ENTMCNC: 32.2 G/DL (ref 31.4–37.4)
MCV RBC AUTO: 112 FL (ref 82–98)
MONOCYTES # BLD AUTO: 0.66 THOUSAND/ΜL (ref 0.17–1.22)
MONOCYTES NFR BLD AUTO: 4 % (ref 4–12)
NEUTROPHILS # BLD AUTO: 16.03 THOUSANDS/ΜL (ref 1.85–7.62)
NEUTS SEG NFR BLD AUTO: 88 % (ref 43–75)
NRBC BLD AUTO-RTO: 1 /100 WBCS
P AXIS: 40 DEGREES
P AXIS: 53 DEGREES
PLATELET # BLD AUTO: 260 THOUSANDS/UL (ref 149–390)
PMV BLD AUTO: 10.8 FL (ref 8.9–12.7)
POTASSIUM SERPL-SCNC: 3.8 MMOL/L (ref 3.5–5.3)
PR INTERVAL: 152 MS
PR INTERVAL: 152 MS
PROCALCITONIN SERPL-MCNC: 4.01 NG/ML
PROT SERPL-MCNC: 4.8 G/DL (ref 6.4–8.4)
QRS AXIS: 66 DEGREES
QRS AXIS: 74 DEGREES
QRSD INTERVAL: 76 MS
QRSD INTERVAL: 76 MS
QT INTERVAL: 314 MS
QT INTERVAL: 360 MS
QTC INTERVAL: 411 MS
QTC INTERVAL: 452 MS
RBC # BLD AUTO: 2.9 MILLION/UL (ref 3.88–5.62)
SODIUM SERPL-SCNC: 136 MMOL/L (ref 135–147)
T WAVE AXIS: 11 DEGREES
T WAVE AXIS: 13 DEGREES
VENTRICULAR RATE: 103 BPM
VENTRICULAR RATE: 95 BPM
WBC # BLD AUTO: 18.17 THOUSAND/UL (ref 4.31–10.16)

## 2023-04-24 RX ORDER — GUAIFENESIN 600 MG/1
600 TABLET, EXTENDED RELEASE ORAL EVERY 12 HOURS SCHEDULED
Status: DISCONTINUED | OUTPATIENT
Start: 2023-04-24 | End: 2023-04-25 | Stop reason: HOSPADM

## 2023-04-24 RX ORDER — DEXAMETHASONE 4 MG/1
4 TABLET ORAL EVERY 8 HOURS SCHEDULED
Status: DISCONTINUED | OUTPATIENT
Start: 2023-04-24 | End: 2023-04-25 | Stop reason: HOSPADM

## 2023-04-24 RX ORDER — MAGNESIUM SULFATE 1 G/100ML
1 INJECTION INTRAVENOUS ONCE
Status: COMPLETED | OUTPATIENT
Start: 2023-04-24 | End: 2023-04-24

## 2023-04-24 RX ORDER — BENZONATATE 100 MG/1
100 CAPSULE ORAL 3 TIMES DAILY PRN
Status: DISCONTINUED | OUTPATIENT
Start: 2023-04-24 | End: 2023-04-25 | Stop reason: HOSPADM

## 2023-04-24 RX ADMIN — MEMANTINE 10 MG: 5 TABLET ORAL at 08:20

## 2023-04-24 RX ADMIN — DEXAMETHASONE 4 MG: 4 TABLET ORAL at 05:37

## 2023-04-24 RX ADMIN — GUAIFENESIN 600 MG: 600 TABLET, EXTENDED RELEASE ORAL at 21:29

## 2023-04-24 RX ADMIN — ALPRAZOLAM 0.5 MG: 0.5 TABLET ORAL at 21:30

## 2023-04-24 RX ADMIN — DABIGATRAN ETEXILATE MESYLATE 150 MG: 150 CAPSULE ORAL at 08:19

## 2023-04-24 RX ADMIN — DABIGATRAN ETEXILATE MESYLATE 150 MG: 150 CAPSULE ORAL at 21:29

## 2023-04-24 RX ADMIN — GUAIFENESIN 600 MG: 600 TABLET, EXTENDED RELEASE ORAL at 13:44

## 2023-04-24 RX ADMIN — PRAZOSIN HYDROCHLORIDE 1 MG: 1 CAPSULE ORAL at 21:30

## 2023-04-24 RX ADMIN — CEFEPIME HYDROCHLORIDE 2000 MG: 2 INJECTION, POWDER, FOR SOLUTION INTRAVENOUS at 05:37

## 2023-04-24 RX ADMIN — DEXAMETHASONE 4 MG: 4 TABLET ORAL at 12:16

## 2023-04-24 RX ADMIN — PANTOPRAZOLE SODIUM 40 MG: 40 TABLET, DELAYED RELEASE ORAL at 05:37

## 2023-04-24 RX ADMIN — MEMANTINE 10 MG: 5 TABLET ORAL at 17:17

## 2023-04-24 RX ADMIN — METOPROLOL SUCCINATE 12.5 MG: 25 TABLET, EXTENDED RELEASE ORAL at 08:19

## 2023-04-24 RX ADMIN — MAGNESIUM SULFATE HEPTAHYDRATE 1 G: 1 INJECTION, SOLUTION INTRAVENOUS at 17:11

## 2023-04-24 RX ADMIN — CEFEPIME HYDROCHLORIDE 2000 MG: 2 INJECTION, POWDER, FOR SOLUTION INTRAVENOUS at 18:26

## 2023-04-24 RX ADMIN — DEXAMETHASONE 4 MG: 4 TABLET ORAL at 21:29

## 2023-04-24 NOTE — ASSESSMENT & PLAN NOTE
· POA creatinine 1 94, increased from baseline of 0 90  · Likely secondary to diarrhea, improved on IVF    Recent Labs     04/22/23  1732 04/23/23  0458 04/24/23  0508   CREATININE 1 94* 1 61* 1 18   ·   · Continue to monitor BMP for improvement

## 2023-04-24 NOTE — PLAN OF CARE
Problem: SAFETY ADULT  Goal: Patient will remain free of falls  Description: INTERVENTIONS:  - Educate patient/family on patient safety including physical limitations  - Instruct patient to call for assistance with activity   - Consult OT/PT to assist with strengthening/mobility   - Keep Call bell within reach  - Keep bed low and locked with side rails adjusted as appropriate  - Keep care items and personal belongings within reach  - Initiate and maintain comfort rounds  - Make Fall Risk Sign visible to staff  - Offer Toileting every Hours, in advance of need  - Initiate/Maintain alarm  - Obtain necessary fall risk management equipment:   - Apply yellow socks and bracelet for high fall risk patients  - Consider moving patient to room near nurses station  Outcome: Progressing  Goal: Maintain or return to baseline ADL function  Description: INTERVENTIONS:  -  Assess patient's ability to carry out ADLs; assess patient's baseline for ADL function and identify physical deficits which impact ability to perform ADLs (bathing, care of mouth/teeth, toileting, grooming, dressing, etc )  - Assess/evaluate cause of self-care deficits   - Assess range of motion  - Assess patient's mobility; develop plan if impaired  - Assess patient's need for assistive devices and provide as appropriate  - Encourage maximum independence but intervene and supervise when necessary  - Involve family in performance of ADLs  - Assess for home care needs following discharge   - Consider OT consult to assist with ADL evaluation and planning for discharge  - Provide patient education as appropriate  Outcome: Progressing  Goal: Maintains/Returns to pre admission functional level  Description: INTERVENTIONS:  - Perform BMAT or MOVE assessment daily    - Set and communicate daily mobility goal to care team and patient/family/caregiver  - Collaborate with rehabilitation services on mobility goals if consulted  - Perform Range of Motion  times a day    - Reposition patient every  hours  - Dangle patient  times a day  - Stand patient  times a day  - Ambulate patient times a day  - Out of bed to chair  times a day   - Out of bed for meals es a day  - Out of bed for toileting  - Record patient progress and toleration of activity level   Outcome: Progressing     Problem: DISCHARGE PLANNING  Goal: Discharge to home or other facility with appropriate resources  Description: INTERVENTIONS:  - Identify barriers to discharge w/patient and caregiver  - Arrange for needed discharge resources and transportation as appropriate  - Identify discharge learning needs (meds, wound care, etc )  - Arrange for interpretive services to assist at discharge as needed  - Refer to Case Management Department for coordinating discharge planning if the patient needs post-hospital services based on physician/advanced practitioner order or complex needs related to functional status, cognitive ability, or social support system  Outcome: Progressing     Problem: Knowledge Deficit  Goal: Patient/family/caregiver demonstrates understanding of disease process, treatment plan, medications, and discharge instructions  Description: Complete learning assessment and assess knowledge base    Interventions:  - Provide teaching at level of understanding  - Provide teaching via preferred learning methods  Outcome: Progressing     Problem: MUSCULOSKELETAL - ADULT  Goal: Maintain or return mobility to safest level of function  Description: INTERVENTIONS:  - Assess patient's ability to carry out ADLs; assess patient's baseline for ADL function and identify physical deficits which impact ability to perform ADLs (bathing, care of mouth/teeth, toileting, grooming, dressing, etc )  - Assess/evaluate cause of self-care deficits   - Assess range of motion  - Assess patient's mobility  - Assess patient's need for assistive devices and provide as appropriate  - Encourage maximum independence but intervene and supervise when necessary  - Involve family in performance of ADLs  - Assess for home care needs following discharge   - Consider OT consult to assist with ADL evaluation and planning for discharge  - Provide patient education as appropriate  Outcome: Progressing  Goal: Maintain proper alignment of affected body part  Description: INTERVENTIONS:  - Support, maintain and protect limb and body alignment  - Provide patient/ family with appropriate education  Outcome: Progressing

## 2023-04-24 NOTE — ASSESSMENT & PLAN NOTE
· Arrived to ED via EMS with stool incontinence, reported recurrence of diarrhea since last admission earlier in the month  o Patient recently admitted for acute diverticulitis 4/11-4/14, now resolved on CT  SIRS criteria with no source, leukocytosis secondary to corticosteroid use  Given history of malignancy as well as relative immunosuppression would be reasonable to continue antibiotics until cultures are negative for at least 48 hours  Continue cefepime Day#2  Procalcitonin, unreliable in malignancy

## 2023-04-24 NOTE — PROGRESS NOTES
[facility]  Progress Note  Name: Orlando Rodrigues  MRN: [id number]  Unit/Bed#: E2 -01 I Date of Admission: 4/22/2023   Date of Service: 4/24/2023 I Hospital Day: 2    Assessment/Plan   * Sepsis [facility])  Assessment & Plan  · Arrived to ED via EMS with stool incontinence, reported recurrence of diarrhea since last admission earlier in the month  o Patient recently admitted for acute diverticulitis 4/11-4/14, now resolved on CT  SIRS criteria with no source, leukocytosis secondary to corticosteroid use  Given history of malignancy as well as relative immunosuppression would be reasonable to continue antibiotics until cultures are negative for at least 48 hours  Continue cefepime Day#2  Procalcitonin, unreliable in malignancy    Personal history of malignant melanoma  Assessment & Plan  · Malignant melanoma with brain mets  · Undergoing whole brain radiation with p o  chemotherapy  · On chronic steroid therapy for hemolytic anemia  · Followed outpatient by [facility] hem/onc  · Medications on hold while septic, resume BRAF agents as an outpatient  ARABELLA (acute kidney injury) ([facility] [address] )  Assessment & Plan  · POA creatinine 1 94, increased from baseline of 0 90  · Likely secondary to diarrhea, improved on IVF  Recent Labs     04/22/23  1732 04/23/23  0458 04/24/23  0508   CREATININE 1 94* 1 61* 1 18   ·   · Continue to monitor BMP for improvement    Shortness of breath  Assessment & Plan  · Resolved    Pulmonary embolism with acute cor pulmonale (HCC)  Assessment & Plan  · Anticoagulated on Pradaxa    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 )    Personal history of malignant melanoma      Plan:    · Continue current medical management  · Pending blood culture results, if negative can consider discharge home with short follow-up  Hematology consultation    VTE Pharmacologic Prophylaxis:   Pharmacologic: Dabigatran (Pradaxa)  Mechanical VTE Prophylaxis in Place: Yes    Patient Centered Rounds: I have performed bedside rounds with nursing staff today  Discussions with Specialists or Other Care Team Provider: Discussed with case management as well as hematology oncology nurse practitioner via [facility] text    Education and Discussions with Family / Patient: Patient updating family    Time Spent for Care: 1 hour  More than 50% of total time spent on counseling and coordination of care as described above  Current Length of Stay: 2 day(s)    Current Patient Status: Inpatient   Certification Statement: The patient will continue to require additional inpatient hospital stay due to SIRS with concern for sepsis as well as acute kidney injury    Discharge Plan / Estimated Discharge Date: Tomorrow    Code Status: Level 1 - Full Code      Subjective: And examined, states he feels well  Reports having cough with no other shortness of breath or difficulty breathing  His diarrhea is resolving  A complete and comprehensive 14 point organ system review has been performed and all other systems are negative other than stated above      Objective:     Vitals:   Temp (24hrs), Av 8 °F (36 6 °C), Min:97 6 °F (36 4 °C), Max:98 3 °F (36 8 °C)    Temp:  [97 6 °F (36 4 °C)-98 3 °F (36 8 °C)] 98 3 °F (36 8 °C)  HR:  [71-88] 71  Resp:  [20] 20  BP: (126-138)/(61-75) 136/75  SpO2:  [96 %-98 %] 98 %  Body mass index is 29 2 kg/m²  Input and Output Summary (last 24 hours):     No intake or output data in the 24 hours ending 04/24/23 1704    Physical Exam:     General: well appearing, no acute distress  HEENT: atraumatic, PERRLA, moist mucosa, normal pharynx, normal tonsils and adenoids, normal tongue, no fluid in sinuses  Neck: Trachea midline, no carotid bruit, no masses  Respiratory: normal chest wall expansion, CTA B, no r/r/w, no rubs  Cardiovascular: RRR, no m/r/g, Normal S1 and S2  Abdomen: Soft, non-tender, non-distended, normal bowel sounds in all quadrants, no hepatosplenomegaly, no tympany  Rectal: deferred  Musculoskeletal: normal ROM in upper and lower extremities  Integumentary: warm, dry, and pink, with no rash, purpura, or petechia  Heme/Lymph: no lymphadenopathy, no bruises  Neurological: Cranial Nerves II-XII grossly intact, no tics, normal sensation to pressure and light touch  Psychiatric: cooperative with normal mood, affect, and cognition      Additional Data:     Labs:    Results from last 7 days   Lab Units 04/24/23  0508   WBC Thousand/uL 18 17*   HEMOGLOBIN g/dL 10 5*   HEMATOCRIT % 32 6*   PLATELETS Thousands/uL 260   NEUTROS PCT % 88*   LYMPHS PCT % 6*   MONOS PCT % 4   EOS PCT % 0     Results from last 7 days   Lab Units 04/24/23  0508   POTASSIUM mmol/L 3 8   CHLORIDE mmol/L 104   CO2 mmol/L 26   BUN mg/dL 24   CREATININE mg/dL 1 18   CALCIUM mg/dL 8 3*   ALK PHOS U/L 39   ALT U/L 29   AST U/L 22     Results from last 7 days   Lab Units 04/22/23  1734   INR  1 16       * I Have Reviewed All Lab Data Listed Above  * Additional Pertinent Lab Tests Reviewed: [address] Admission Reviewed    Imaging:    Imaging Reports Reviewed Today Include: No new imaging  Imaging Personally Reviewed by Myself Includes: No new imaging    Recent Cultures (last 7 days):     Results from last 7 days   Lab Units 04/22/23  1805 04/22/23  1732   BLOOD CULTURE  No Growth at 24 hrs  No Growth at 24 hrs  Last 24 Hours Medication List:   Current Facility-Administered Medications   Medication Dose Route Frequency Provider Last Rate    acetaminophen  650 mg Oral Q6H PRN Hailey [place], PA-C      ALPRAZolam  0 5 mg Oral BID Hailey [place], PA-C      aluminum-magnesium hydroxide-simethicone  30 mL Oral Q6H PRN Hailey [place], PA-C      benzonatate  100 mg Oral TID PRN Rupert Stein DO      cefepime  2,000 mg Intravenous Q12H Hailey [place], PA-C 2,000 mg (04/24/23 0537)    dabigatran etexilate  150 mg Oral Q12H [address] Hailey Yasmani, PA-C      dexamethasone  4 mg Oral [facility] Shaheen Navarro,       guaiFENesin  600 mg Oral Q12H [address] Shaheen Loyola Litter, DO      magnesium sulfate  1 g Intravenous Once Rupert Stein DO      memantine  10 mg Oral BID Hailey Yasmani, PA-C      metoprolol succinate  12 5 mg Oral Daily Hailey Yasmani, PA-C      ondansetron  4 mg Intravenous Q6H PRN Hailey Yasmani, PA-C      pantoprazole  40 mg Oral Early Morning Hailey [place], PA-C      prazosin  1 mg Oral HS WILLIAM Rosales-CONNIE          Today, Patient Was Seen By: Rupert Stein DO    ** Please Note: This note was completed in part utilizing M-Modal Fluency Direct Software  Grammatical errors, random word insertions, spelling mistakes, and incomplete sentences may be an occasional consequence of this system secondary to software limitations, ambient noise, and hardware issues  If you have any questions or concerns about the content, text, or information contained within the body of this dictation, please contact the provider for clarification   **

## 2023-04-24 NOTE — ASSESSMENT & PLAN NOTE
· Baseline hemoglobin 12-14  · Currently 14 0  · Patient gets weekly CBC for close monitoring with Dr Randy Morel  · Continue dexamethasone  · Previously on rituxan

## 2023-04-24 NOTE — ASSESSMENT & PLAN NOTE
· Malignant melanoma with brain mets  · Undergoing whole brain radiation with p o  chemotherapy  · On chronic steroid therapy for hemolytic anemia  · Followed outpatient by [facility] hem/onc  · Medications on hold while septic, resume BRAF agents as an outpatient

## 2023-04-24 NOTE — PLAN OF CARE
Problem: SAFETY ADULT  Goal: Patient will remain free of falls  Description: INTERVENTIONS:  - Educate patient/family on patient safety including physical limitations  - Instruct patient to call for assistance with activity   - Consult OT/PT to assist with strengthening/mobility   - Keep Call bell within reach  - Keep bed low and locked with side rails adjusted as appropriate  - Keep care items and personal belongings within reach  - Initiate and maintain comfort rounds  - Make Fall Risk Sign visible to staff  - Offer Toileting every 2 Hours, in advance of need  - Initiate/Maintain bed alarm  - Obtain necessary fall risk management equipment: gripper socks, call bell  - Apply yellow socks and bracelet for high fall risk patients  - Consider moving patient to room near nurses station  Outcome: Progressing  Goal: Maintain or return to baseline ADL function  Description: INTERVENTIONS:  -  Assess patient's ability to carry out ADLs; assess patient's baseline for ADL function and identify physical deficits which impact ability to perform ADLs (bathing, care of mouth/teeth, toileting, grooming, dressing, etc )  - Assess/evaluate cause of self-care deficits   - Assess range of motion  - Assess patient's mobility; develop plan if impaired  - Assess patient's need for assistive devices and provide as appropriate  - Encourage maximum independence but intervene and supervise when necessary  - Involve family in performance of ADLs  - Assess for home care needs following discharge   - Consider OT consult to assist with ADL evaluation and planning for discharge  - Provide patient education as appropriate  Outcome: Progressing     Problem: DISCHARGE PLANNING  Goal: Discharge to home or other facility with appropriate resources  Description: INTERVENTIONS:  - Identify barriers to discharge w/patient and caregiver  - Arrange for needed discharge resources and transportation as appropriate  - Identify discharge learning needs (meds, wound care, etc )  - Arrange for interpretive services to assist at discharge as needed  - Refer to Case Management Department for coordinating discharge planning if the patient needs post-hospital services based on physician/advanced practitioner order or complex needs related to functional status, cognitive ability, or social support system  Outcome: Progressing     Problem: Knowledge Deficit  Goal: Patient/family/caregiver demonstrates understanding of disease process, treatment plan, medications, and discharge instructions  Description: Complete learning assessment and assess knowledge base    Interventions:  - Provide teaching at level of understanding  - Provide teaching via preferred learning methods  Outcome: Progressing     Problem: MUSCULOSKELETAL - ADULT  Goal: Maintain or return mobility to safest level of function  Description: INTERVENTIONS:  - Assess patient's ability to carry out ADLs; assess patient's baseline for ADL function and identify physical deficits which impact ability to perform ADLs (bathing, care of mouth/teeth, toileting, grooming, dressing, etc )  - Assess/evaluate cause of self-care deficits   - Assess range of motion  - Assess patient's mobility  - Assess patient's need for assistive devices and provide as appropriate  - Encourage maximum independence but intervene and supervise when necessary  - Involve family in performance of ADLs  - Assess for home care needs following discharge   - Consider OT consult to assist with ADL evaluation and planning for discharge  - Provide patient education as appropriate  Outcome: Progressing  Goal: Maintain proper alignment of affected body part  Description: INTERVENTIONS:  - Support, maintain and protect limb and body alignment  - Provide patient/ family with appropriate education  Outcome: Progressing

## 2023-04-25 VITALS
BODY MASS INDEX: 29.11 KG/M2 | OXYGEN SATURATION: 100 % | DIASTOLIC BLOOD PRESSURE: 91 MMHG | SYSTOLIC BLOOD PRESSURE: 157 MMHG | RESPIRATION RATE: 20 BRPM | WEIGHT: 192.04 LBS | HEIGHT: 68 IN | TEMPERATURE: 97.9 F | HEART RATE: 69 BPM

## 2023-04-25 LAB
C DIFF TOX A+B STL QL IA: NEGATIVE
C DIFF TOX B TCDB STL QL NAA+PROBE: POSITIVE
CAMPYLOBACTER DNA SPEC NAA+PROBE: NORMAL
SALMONELLA DNA SPEC QL NAA+PROBE: NORMAL
SHIGA TOXIN STX GENE SPEC NAA+PROBE: NORMAL
SHIGELLA DNA SPEC QL NAA+PROBE: NORMAL

## 2023-04-25 RX ADMIN — CEFEPIME HYDROCHLORIDE 2000 MG: 2 INJECTION, POWDER, FOR SOLUTION INTRAVENOUS at 05:31

## 2023-04-25 RX ADMIN — GUAIFENESIN 600 MG: 600 TABLET, EXTENDED RELEASE ORAL at 08:59

## 2023-04-25 RX ADMIN — PANTOPRAZOLE SODIUM 40 MG: 40 TABLET, DELAYED RELEASE ORAL at 05:14

## 2023-04-25 RX ADMIN — METOPROLOL SUCCINATE 12.5 MG: 25 TABLET, EXTENDED RELEASE ORAL at 08:59

## 2023-04-25 RX ADMIN — DABIGATRAN ETEXILATE MESYLATE 150 MG: 150 CAPSULE ORAL at 08:59

## 2023-04-25 RX ADMIN — MEMANTINE 10 MG: 5 TABLET ORAL at 08:59

## 2023-04-25 RX ADMIN — DEXAMETHASONE 4 MG: 4 TABLET ORAL at 05:14

## 2023-04-25 RX ADMIN — ALPRAZOLAM 0.5 MG: 0.5 TABLET ORAL at 08:59

## 2023-04-25 NOTE — ASSESSMENT & PLAN NOTE
· Baseline hemoglobin 12-14  · Patient gets weekly CBC for close monitoring with Dr Leticia Franco  · Continue dexamethasone  · Previously on rituxan  · Oncology follow-up

## 2023-04-25 NOTE — PLAN OF CARE
Problem: SAFETY ADULT  Goal: Patient will remain free of falls  Description: INTERVENTIONS:  - Educate patient/family on patient safety including physical limitations  - Instruct patient to call for assistance with activity   - Consult OT/PT to assist with strengthening/mobility   - Keep Call bell within reach  - Keep bed low and locked with side rails adjusted as appropriate  - Keep care items and personal belongings within reach  - Initiate and maintain comfort rounds  - Make Fall Risk Sign visible to staff  - Offer Toileting every 2 Hours, in advance of need  - Initiate/Maintain bed alarm  - Apply yellow socks and bracelet for high fall risk patients  - Consider moving patient to room near nurses station  Outcome: Progressing     Problem: GASTROINTESTINAL - ADULT  Goal: Minimal or absence of nausea and/or vomiting  Description: INTERVENTIONS:  - Administer IV fluids if ordered to ensure adequate hydration  - Maintain NPO status until nausea and vomiting are resolved  - Nasogastric tube if ordered  - Administer ordered antiemetic medications as needed  - Provide nonpharmacologic comfort measures as appropriate  - Advance diet as tolerated, if ordered  - Consider nutrition services referral to assist patient with adequate nutrition and appropriate food choices  Outcome: Progressing  Goal: Maintains or returns to baseline bowel function  Description: INTERVENTIONS:  - Assess bowel function  - Encourage oral fluids to ensure adequate hydration  - Administer IV fluids if ordered to ensure adequate hydration  - Administer ordered medications as needed  - Encourage mobilization and activity  - Consider nutritional services referral to assist patient with adequate nutrition and appropriate food choices  Outcome: Progressing

## 2023-04-25 NOTE — ASSESSMENT & PLAN NOTE
· POA creatinine 1 94, increased from baseline of 0 90  · Likely secondary to diarrhea, improved on IVF    Recent Labs     04/22/23  1732 04/23/23  0458 04/24/23  0508   CREATININE 1 94* 1 61* 1 18     · Continue to monitor BMP for improvement

## 2023-04-25 NOTE — ASSESSMENT & PLAN NOTE
· Arrived to ED via EMS with stool incontinence, reported recurrence of diarrhea since last admission earlier in the month  o Patient recently admitted for acute diverticulitis 4/11-4/14, now resolved on CT  SIRS criteria with no source, leukocytosis secondary to corticosteroid use  Given history of malignancy as well as relative immunosuppression would be reasonable to continue antibiotics until cultures are negative for at least 48 hours  Continue cefepime Day#3  Procalcitonin, unreliable in malignancy

## 2023-04-25 NOTE — PLAN OF CARE
Problem: GASTROINTESTINAL - ADULT  Goal: Minimal or absence of nausea and/or vomiting  Description: INTERVENTIONS:  - Administer IV fluids if ordered to ensure adequate hydration  - Maintain NPO status until nausea and vomiting are resolved  - Nasogastric tube if ordered  - Administer ordered antiemetic medications as needed  - Provide nonpharmacologic comfort measures as appropriate  - Advance diet as tolerated, if ordered  - Consider nutrition services referral to assist patient with adequate nutrition and appropriate food choices  Outcome: Progressing  Goal: Maintains or returns to baseline bowel function  Description: INTERVENTIONS:  - Assess bowel function  - Encourage oral fluids to ensure adequate hydration  - Administer IV fluids if ordered to ensure adequate hydration  - Administer ordered medications as needed  - Encourage mobilization and activity  - Consider nutritional services referral to assist patient with adequate nutrition and appropriate food choices  Outcome: Progressing     Problem: Knowledge Deficit  Goal: Patient/family/caregiver demonstrates understanding of disease process, treatment plan, medications, and discharge instructions  Description: Complete learning assessment and assess knowledge base    Interventions:  - Provide teaching at level of understanding  - Provide teaching via preferred learning methods  Outcome: Progressing     Problem: DISCHARGE PLANNING  Goal: Discharge to home or other facility with appropriate resources  Description: INTERVENTIONS:  - Identify barriers to discharge w/patient and caregiver  - Arrange for needed discharge resources and transportation as appropriate  - Identify discharge learning needs (meds, wound care, etc )  - Arrange for interpretive services to assist at discharge as needed  - Refer to Case Management Department for coordinating discharge planning if the patient needs post-hospital services based on physician/advanced practitioner order or complex needs related to functional status, cognitive ability, or social support system  Outcome: Progressing     Problem: SAFETY ADULT  Goal: Patient will remain free of falls  Description: INTERVENTIONS:  - Educate patient/family on patient safety including physical limitations  - Instruct patient to call for assistance with activity   - Consult OT/PT to assist with strengthening/mobility   - Keep Call bell within reach  - Keep bed low and locked with side rails adjusted as appropriate  - Keep care items and personal belongings within reach  - Initiate and maintain comfort rounds  - Make Fall Risk Sign visible to staff  - Offer Toileting every 2 Hours, in advance of need  - Initiate/Maintain bed alarm  - Obtain necessary fall risk management equipment: walker  - Apply yellow socks and bracelet for high fall risk patients  - Consider moving patient to room near nurses station  Outcome: Progressing

## 2023-04-25 NOTE — DISCHARGE SUMMARY
[employer] [employer]  Discharge- Jesse Jo 1955, 70 y o  male MRN: [id number]  Unit/Bed#: E2 -01 Encounter: [id number]  Primary Care Provider: Aly Aguilera DO   Date and time admitted to hospital: 4/22/2023  4:49 PM      Admitting Provider:  Ankit Fagan MD  Discharge Provider:  Elizabet Reyes DO  Admission Date: 4/22/2023       Discharge Date: 04/25/23   LOS: 3  Primary Care Physician at Discharge: Jessica Garcia 74 COURSE:  Jesse Jo is a 70 y o  male who presented with diarrheal illness as well as tachycardia and concern for developing sepsis  His past medical history is notable for hypertension, previous history of pulmonary embolism for which he is on Pradaxa, metastatic melanoma with known metastasis to the brain lung and retroperitoneum  In the emergency room he endorsed shortness of breath, he had been recently treated for diverticulitis and his symptoms had improved  He denies any recent travel or trip history, no recent consumption of any suspicious food products  Patient was given aggressive IV fluid, he was started on cefepime and received 3 days of antibiotic therapy  His cultures remain negative  He did clinically improve, oncology consultation was obtained to discuss resumption of his melanoma medications as well as disposition management and planning for his known history of worn antibody hemolytic anemia  The patient remained without any evidence of ongoing systemic infection  I suspect the patient likely had SIRS of noninfectious etiology rather than a true infection  At the time of discharge the patient was without acute complaint he was medically optimized for discharge home and will have planned outpatient follow-up with his oncology and PCP      All questions were answered the patient's satisfaction and he was in agreement with the discharge plan    DISCHARGE DIAGNOSES  * Sepsis [facility])  Assessment & Plan  · Arrived to ED via EMS with stool incontinence, reported recurrence of diarrhea since last admission earlier in the month  o Patient recently admitted for acute diverticulitis 4/11-4/14, now resolved on CT  SIRS criteria with no source, leukocytosis secondary to corticosteroid use  Given history of malignancy as well as relative immunosuppression would be reasonable to continue antibiotics until cultures are negative for at least 48 hours  Continue cefepime Day#3  Procalcitonin, unreliable in malignancy    Personal history of malignant melanoma  Assessment & Plan  · Malignant melanoma with brain mets  · Undergoing whole brain radiation with p o  chemotherapy  · On chronic steroid therapy for hemolytic anemia  · Followed outpatient by [facility] hem/onc  · Medications on hold while septic, resume BRAF agents as an outpatient  ARABELLA (acute kidney injury) (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  Assessment & Plan  · Baseline hemoglobin 12-14  · Patient gets weekly CBC for close monitoring with Dr Nick Tello  · Continue dexamethasone  · Previously on rituxan  · Oncology follow-up      CONSULTING PROVIDERS   IP CONSULT TO ONCOLOGY    PROCEDURES PERFORMED  * No surgery found *    RADIOLOGY RESULTS  CT abdomen pelvis wo contrast    Result Date: 4/12/2023    Impression: Findings consistent with mild acute diverticulitis junction of the descending colon and proximal sigmoid colon  No bowel obstruction  Nodule in the right retroperitoneum, anterior to the right kidney, new since the prior study  This may reflect a metastatic deposit  Additional findings as above  XR chest 1 view portable    Impression: No acute cardiopulmonary disease  XR chest 1 view portable    Result Date: 4/12/2023    Impression: Low lung volumes No acute cardiopulmonary disease  CT chest abdomen pelvis w contrast    Result Date: 4/22/2023    Impression: No acute findings in the chest, abdomen or pelvis  Resolved descending colonic diverticulitis without new bowel findings  Multiple subtle enhancing lesions throughout the liver in keeping with numerous hepatic metastases  Known pulmonary and retroperitoneal metastases  Unchanged right iliac chain and inguinal adenopathy in this patient with metastatic melanoma  Known osseous metastases are not well visualized on this study  CTA chest pe study    Result Date: 4/18/2023    Impression: No pulmonary arterial embolism or pulmonary infiltrate/consolidation  New scattered pulmonary nodules suspicious for metastases  Echo complete w/ contrast if indicated    Result Date: 4/23/2023  Narrative: Jenna Sharif  Left Ventricle: Left ventricular cavity size is normal  Wall thickness is normal  The left ventricular ejection fraction is 65%   Systolic function is normal  Wall motion is normal  Diastolic function is normal        LABS  Results from last 7 days   Lab Units 04/24/23  0508 04/23/23  0458 04/22/23  1734 04/22/23  1732 04/20/23  1016   WBC Thousand/uL 18 17* 16 18*  --  21 19* 18 89*   HEMOGLOBIN g/dL 10 5* 11 7*  --  14 0 12 3   HEMATOCRIT % 32 6* 36 5  --  43 0 37 3   MCV fL 112* 114*  --  113* 113*   TOTAL NEUT ABS Thousand/uL  --   --   --  18 86*  --    PLATELETS Thousands/uL 260 318  --  355 559*   INR   --   --  1 16  --   --      Results from last 7 days   Lab Units 04/24/23  0508 04/23/23  0458 04/22/23  1732   SODIUM mmol/L 136 139 139 "  POTASSIUM mmol/L 3 8 2 9* 3 2*   CHLORIDE mmol/L 104 103 98   CO2 mmol/L 26 27 26   BUN mg/dL 24 23 21   CREATININE mg/dL 1 18 1 61* 1 94*   CALCIUM mg/dL 8 3* 7 8* 9 3   ALBUMIN g/dL 2 8*  --  3 7   TOTAL BILIRUBIN mg/dL 0 54  --  1 28*   ALK PHOS U/L 39  --  66   ALT U/L 29  --  57*   AST U/L 22  --  55*   EGFR ml/min/1 73sq m 62 42 34   GLUCOSE RANDOM mg/dL 162* 77 137     Results from last 7 days   Lab Units 04/22/23  2150 04/22/23  1934 04/22/23  1732   HS TNI 0HR ng/L  --   --  34   HS TNI 2HR ng/L  --  51*  --    HS TNI 4HR ng/L 66*  --   --                           Results from last 7 days   Lab Units 04/24/23  0508 04/22/23  1934 04/22/23  1732   LACTIC ACID mmol/L  --  1 4 5 3*   PROCALCITONIN ng/ml 4 01*  --  1 19*           Cultures:   Results from last 7 days   Lab Units 04/22/23  2114   COLOR UA  Yellow   CLARITY UA  Slightly Cloudy   SPEC GRAV UA  <=1 005   PH UA  5 5   LEUKOCYTES UA  Negative   NITRITE UA  Negative   GLUCOSE UA mg/dl Negative   KETONES UA mg/dl Negative   BILIRUBIN UA  Negative   BLOOD UA  Trace-Intact*      Results from last 7 days   Lab Units 04/22/23  2114   RBC UA /hpf 0-1*   WBC UA /hpf 2-4   EPITHELIAL CELLS WET PREP /hpf Occasional   BACTERIA UA /hpf Occasional      Results from last 7 days   Lab Units 04/22/23  1805 04/22/23  1732   BLOOD CULTURE  No Growth at 48 hrs  No Growth at 48 hrs     INFLUENZA A PCR   --  Negative       PHYSICAL EXAM:  Vitals:   Blood Pressure: 157/91 (04/25/23 0722)  Pulse: 69 (04/25/23 0722)  Temperature: 97 9 °F (36 6 °C) (04/25/23 0722)  Temp Source: Temporal (04/25/23 4681)  Respirations: 20 (04/25/23 0722)  Height: 5' 8\" (172 7 cm) (04/23/23 1453)  Weight - Scale: 87 1 kg (192 lb 0 7 oz) (04/25/23 0600)  SpO2: 100 % (04/25/23 0722)      General: well appearing, no acute distress  HEENT: atraumatic, PERRLA, moist mucosa, normal pharynx, normal tonsils and adenoids, normal tongue, no fluid in sinuses  Neck: Trachea midline, no carotid bruit, no " masses  Respiratory: normal chest wall expansion, CTA B, no r/r/w, no rubs  Cardiovascular: RRR, no m/r/g, Normal S1 and S2  Abdomen: Soft, non-tender, non-distended, normal bowel sounds in all quadrants, no hepatosplenomegaly, no tympany  Rectal: deferred  Musculoskeletal: normal ROM in upper and lower extremities  Integumentary: warm, dry, and pink, with no rash, purpura, or petechia  Heme/Lymph: no lymphadenopathy, no bruises  Neurological: Cranial Nerves II-XII grossly intact, no tics, normal sensation to pressure and light touch  Psychiatric: cooperative with normal mood, affect, and cognition      Discharge Disposition: Home/Self Care      Test Results Pending at Discharge:   Pending Labs     Order Current Status    Clostridium difficile toxin by PCR with EIA In process    Stool Enteric Bacterial Panel by PCR In process    Blood culture #1 Preliminary result    Blood culture #2 Preliminary result              Medications   · Summary of Medication Adjustments made as a result of this hospitalization: See discharge list  · Medication Dosing Tapers - Please refer to Discharge Medication List for details on any medication dosing tapers (if applicable to patient)  · Discharge Medication List: See after visit summary for reconciled discharge medications  Diet restrictions:         Diet Orders   (From admission, onward)             Start     Ordered    04/22/23 2327  Diet Regular; Regular House  Diet effective now        References:    Nutrtion Support Algorithm Enteral vs  Parenteral   Question Answer Comment   Diet Type Regular    Regular Regular House    RD to adjust diet per protocol? Yes        04/22/23 2326              Activity restrictions: No strenuous activity  Discharge Condition: fair    Outpatient Follow-Up and Discharge Instructions  See after visit summary section titled Discharge Instructions for information provided to patient and family        Code Status: Level 1 - Full Code  Discharge Statement   I spent 35 minutes discharging the patient  This time was spent on the day of discharge  Greater than 50% of total time was spent with the patient and / or family counseling and / or coordination of care  ** Please Note: This note was completed in part utilizing M-Modal Fluency Direct Software  Grammatical errors, random word insertions, spelling mistakes, and incomplete sentences may be an occasional consequence of this system secondary to software limitations, ambient noise, and hardware issues  If you have any questions or concerns about the content, text, or information contained within the body of this dictation, please contact the provider for clarification  **

## 2023-04-25 NOTE — DISCHARGE INSTR - AVS FIRST PAGE
Dear Jose Monsivais,     It was our pleasure to care for you here at [facility], [facility]  It is our hope that we were always able to exceed the expected standards for your care during your stay  You were hospitalized due to SIRS with concern for developing sepsis  You were cared for on the 2nd floor by Chriss Castillo DO with the [facility] who covers for your primary care physician (PCP), Arturo Mireles DO, while you were hospitalized  If you have any questions or concerns related to this hospitalization, you may contact us at [phone]  For follow up as well as any medication refills, we recommend that you follow up with your primary care physician  A registered nurse will reach out to you by phone within a few days after your discharge to answer any additional questions that you may have after going home  However, at this time we provide for you here, the most important instructions / recommendations at discharge:     Notable Medication Adjustments -   No changes to chronic home medication  Can continue your melanoma drugs as determined by the oncology center  Testing Required after Discharge -   None  Important follow up information -   PCP follow-up in a week  Oncology follow-up in 1 to 2-week  Other Instructions -   None  Please review this entire after visit summary as additional general instructions including medication list, appointments, activity, diet, any pertinent wound care, and other additional recommendations from your care team that may be provided for you        Sincerely,     Chriss Castillo DO and Nurse Nickie Lyon

## 2023-04-25 NOTE — CONSULTS
Medical Oncology/Hematology Consult Note  Teodora Frankel, male, 70 y o , 1955,  Michael Dawn 2 /E2 -*, [phone]     Reason for consultation: Metastatic melanoma, autoimmune hemolytic anemia     Patient is a 75 y o  male  with PMH of autoimmune hemolytic anemia followed by Dr Cony Rodríguez, and metastatic melanoma followed by Dr Sweetie Kelsey  He is currently on adjuvant encorafenib and binimetinib for his melanoma  He presented to the ED with stool incontinence, reported recurrence of diarrhea since last admission  He was admitted for acute diverticulitis and was discharged last week, 4/14/23  CT imaging showing diverticulitis has resolved  Hematology-oncology was consulted to offer recommendations for any treatment while inpatient  Assessment and Plan:  1  Metastatic melanoma   -Biopsy (3/17/2023) R peritoneum consistent with mslignant melanoma  Metastatic to brain   -Has BRAF mutation, therefore, placed on encorafenib and binimetinib since the beginning of April 2023  -MRI brain (3/4/23)- Too numerous to count metastatic lesions in bilateral cerebral hemispheres and to a lesser extent in bilateral cerebellum with perilesional vasogenic edema (bilateral frontal, bilateral parietal, right subinsular, left temporal, and bilateral occipital lobes - worse in right frontal lobe)  -CT C/A/P (4/22/23) -Multiple subtle enhancing lesions throughout the liver in keeping with numerous hepatic metastases  Known pulmonary and retroperitoneal metastases  Unchanged right iliac chain and inguinal adenopathy in this patient with metastatic melanoma  Known osseous metastases are not well visualized on this study      2  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  -Was on Rituxan in the past, last received on 8/2022   -Currently on chronic prednisone treatment  -Hemoglobin stable: 10 5 (4/24) <1 7<14 0 (admission date)<12 3  -, MCHC 32 2, macrocytic, normochromic  -Vitamin B-12 815 (3/11/21) will re-order  -Folate 8 6 (3/11/21) will re-order    PLAN:  1) No recommended treatment while inpatient  Discussed with Dr Alesia Lorenzo  2) Continue to hold melanoma meds,  encorafenib (Braftovi) and binimetinib (Mektovi)  Continues with his Decadron 4 mg every 8 hours  3) Vitamin B-12 and folate pending  Continue with weekly CBC as dictated by Dr Natanael Gupta  4) Transfuse as needed for hemoglobin less than 7 g/dL  Hemoglobin stable at this time at 10 5  No signs of bleeding  Denies hematemesis, hematochezia, hematuria, epistaxis    Outpatient follow up plan: to be set up by inpatient Cancer Coordinator, NOAM Gonsalves    Interval history:  Patient reported feeling very anxious about his hemoglobin slowly dropping  Discussed that he was admitted with suspicion of sepsis in relation to his previous hospital admission for acute diverticulitis  He has been having formed stools, most likely low suspicion for C  difficile  CT imaging showed improvement with his diverticulitis as well  He does have presence of leukocytosis which could also be secondary to his chronic steroids  He is on antibiotics at this time as he has satisfied SIRS criteria  Complains of mild abdominal discomfort, but feels well overall  He understands that we are holding his melanoma treatment while inpatient  Thank you for this consult  Fer Stern DNP, V[facility]NU  Hematology-Oncology     History of present illness:  Michelle Lira is a 70 y o  male with PMH of hypertension, GERD, hyperglycemia, hypercholesterolemia  He is currently being followed by Dr Bran Noble for his 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 and Dr Alesia Lorenzo for his melanoma  He presented to the ED with stool incontinence  He reported recurrence of diarrhea since last admission; recently admitted for acute diverticulitis 4/11-4/14, now resolved on CT imaging   For his autoimmune hemolytic anemia, he has been taking prednisone chronically with weekly CBCs    For his metatastic melanoma, he's currently on adjuvant encorafenib and binimetinib  He is currently on antibiotics, met sepsis criteria upon admission  Hematology/oncology was consulted regarding recommendations for treatment while inpatient      Review of Systems:   Review of Systems   Constitutional: Positive for activity change and fatigue  Negative for chills and fever  HENT: Negative for ear pain and sore throat  Eyes: Negative for pain and visual disturbance  Respiratory: Positive for shortness of breath  Negative for cough  Cardiovascular: Negative for chest pain and palpitations  Gastrointestinal: Positive for abdominal distention  Negative for abdominal pain and vomiting  Abdominal discomfort   Genitourinary: Negative for dysuria and hematuria  Musculoskeletal: Negative for arthralgias and back pain  Skin: Negative for color change and rash  Neurological: Positive for weakness  Negative for seizures and syncope  Psychiatric/Behavioral: The patient is nervous/anxious  All other systems reviewed and are negative  Oncology History:   Cancer Staging   Personal history of malignant melanoma  Staging form: Melanoma of the Skin, AJCC 8th Edition  - Clinical stage from 5/31/2022: Stage IIB (cT4a, cN0, cM0) - Signed by Lennox Habermann, MD on 8/25/2022  - Pathologic stage from 7/29/2022: Stage IIIC (pT4a, pN1a, cM0) - Signed by Lennox Habermann, MD on 8/25/2022    Oncology History   Personal history of malignant melanoma   5/31/2022 Biopsy    Right Leg, Shave Biopsy   Melanoma extending to the deep specimen margin  Thickness: 7 0mm  Ulceration: not seen   Mitoses: 3      5/31/2022 -  Cancer Staged    Staging form: Melanoma of the Skin, AJCC 8th Edition  - Clinical stage from 5/31/2022: Stage IIB (cT4a, cN0, cM0) - Signed by Lennox Habermann, MD on 8/25/2022 7/1/2022 Initial Diagnosis    Malignant melanoma of leg, right ([facility] [address] )     7/29/2022 Surgery    A   Lymph node, sentinel, #1:  One lymph node with rare metastatic melanoma cells (1/1), subcapsular location  Immunohistochemical study for MART-1, HMB45 and S100 supports the findings  B  Leg, right, knee, wide excision:  Residual melanoma, nodular type, and scar; margins free  See synoptic report  7/29/2022 -  Cancer Staged    Staging form: Melanoma of the Skin, AJCC 8th Edition  - Pathologic stage from 7/29/2022: Stage IIIC (pT4a, pN1a, cM0) - Signed by Kriss Brery MD on 8/25/2022 12/6/2022 Biopsy    Final Diagnosis   A  Skin, Right dorsal hand, Shave biopsy:  Squamous cell carcinoma in-situ, at least; extending to biopsy margins               Past Medical History:   Past Medical History:   Diagnosis Date    Anemia 11/02/2016    Anxiety     Arthritis     Autoimmune hemolytic anemia (HCC)     Claustrophobia     COVID 12/2020    DVT (deep venous thrombosis) (HCC)     GERD (gastroesophageal reflux disease)     Hearing loss, right     Hemolytic anemia (HCC)     History of transfusion     2018 - no adverse reaction    Hypertension     Malignant melanoma of leg, right ([facility] [address] ) 07/01/2022    Palpitation     Portal vein thrombosis     PTSD (post-traumatic stress disorder)     Pulmonary emboli (HCC)     Squamous cell skin cancer 12/20/2022    SCCIS- 12/6/22    Tobacco abuse        Past Surgical History:   Procedure Laterality Date    CATARACT EXTRACTION Bilateral     CHOLECYSTECTOMY  07/18/2017    COLONOSCOPY      ELBOW ARTHROPLASTY Left     bursectomy    IR BIOPSY RETROPERITONEUM  3/17/2023    IR DRAINAGE TUBE CHECK WITH SCLEROSIS  10/06/2022    IR DRAINAGE TUBE CHECK WITH SCLEROSIS  10/10/2022    IR DRAINAGE TUBE CHECK WITH SCLEROSIS  10/20/2022    IR DRAINAGE TUBE CHECK WITH SCLEROSIS  10/27/2022    IR DRAINAGE TUBE CHECK WITH SCLEROSIS  11/04/2022    IR DRAINAGE TUBE CHECK WITH SCLEROSIS  11/15/2022    IR DRAINAGE TUBE CHECK WITH SCLEROSIS  11/25/2022    IR DRAINAGE TUBE PLACEMENT  09/19/2022    JOINT REPLACEMENT Right 02/02/2021    knee    KNEE SURGERY Right     meniscus tear    LYMPH NODE BIOPSY Right 07/29/2022    Procedure: BIOPSY LYMPH NODE SENTINEL;  Surgeon: Jw Hahn MD;  [facility];  Service: Surgical Oncology    MOHS SURGERY Right 12/20/2022    Right dorsal hand SCCIS-Dr Donna Hinds    SC ARTHRP KNE CONDYLE&PLATU MEDIAL&LAT COMPARTMENTS Right 02/02/2021    Procedure: ARTHROPLASTY KNEE TOTAL;  Surgeon: Josie Escudero MD;  [facility];  Service: Orthopedics    SC ARTHRP KNE CONDYLE&PLATU MEDIAL&LAT COMPARTMENTS Left 11/17/2021    Procedure: TOTAL KNEE REPLACEMENT;  Surgeon: Josie Escudero MD;  [facility];  Service: Orthopedics    SC LAPS SURG Durel Sergeant Yajaira Silk N/A 12/23/2017    Procedure: CHOLECYSTECTOMY LAPAROSCOPIC with cholangiogram;  Surgeon: Brandie Goldman MD;  [facility];  Service: General    SC SPLENECTOMY TOTAL SEPARATE PROCEDURE N/A 05/18/2017    Procedure: LAPAROSCOPIC HAND ASSIST SPLENECTOMY;  Surgeon: Fredy Castorena MD;  [facility];  Service: Surgical Oncology    SHOULDER SURGERY Left     rotator cuff x4, reconstruction    SKIN BIOPSY  5 May 2022   Johny Abt SKIN CANCER EXCISION  29 July 2022    SKIN LESION EXCISION Right 07/29/2022    Procedure: WIDE EXCISION RIGHT MEDIAL THIGH;  Surgeon: Jw Hahn MD;  [facility];  Service: Surgical Oncology       Family History   Problem Relation Age of Onset   Johny Abt Cancer Mother     Breast cancer Mother     Other Father         CABG    Heart attack Paternal Grandfather         acute MI       Social History     Socioeconomic History    Marital status: /Civil Union     Spouse name: None    Number of children: None    Years of education: None    Highest education level: None   Occupational History    None   Tobacco Use    Smoking status: Some Days     Packs/day: 0 00     Years: 5 00     Pack years: 0 00     Types: Cigars, Cigarettes    Smokeless tobacco: Current     Types: Snuff    Tobacco comments:     5  a week average   Vaping Use    Vaping Use: Never used   Substance and Sexual Activity    Alcohol use: Yes     Alcohol/week: 6 0 standard drinks     Types: 6 Cans of beer per week     Comment: socially    Drug use: Yes     Types: Marijuana     Comment: medical marijuana    Sexual activity: Yes     Partners: Female     Birth control/protection: None   Other Topics Concern    None   Social History Narrative    Daily coffee consumption (2 cups/day)    reitred     Social Determinants of Health     Financial Resource Strain: Not on file   Food Insecurity: No Food Insecurity    Worried About Running Out of Food in the Last Year: Never true    Michelle of Food in the Last Year: Never true   Transportation Needs: No Transportation Needs    Lack of Transportation (Medical): No    Lack of Transportation (Non-Medical):  No   Physical Activity: Not on file   Stress: Not on file   Social Connections: Not on file   Intimate Partner Violence: Not on file   Housing Stability: Low Risk     Unable to Pay for Housing in the Last Year: No    Number of Places Lived in the Last Year: 1    Unstable Housing in the Last Year: No         Current Facility-Administered Medications:     acetaminophen (TYLENOL) tablet 650 mg, 650 mg, Oral, Q6H PRN, Bernarda Maxwell PA-C, 650 mg at 04/23/23 8324    ALPRAZolam Derral Tong) tablet 0 5 mg, 0 5 mg, Oral, BID, Bernarda Maxwell PA-C, 0 5 mg at 04/25/23 0859    aluminum-magnesium hydroxide-simethicone (MYLANTA) oral suspension 30 mL, 30 mL, Oral, Q6H PRN, Bernarda Maxwell PA-C    benzonatate (TESSALON PERLES) capsule 100 mg, 100 mg, Oral, TID PRN, Silvana Olvera DO    cefepime (MAXIPIME) 2,000 mg in dextrose 5 % 50 mL IVPB, 2,000 mg, Intravenous, Q12H, Bernarda Maxwell PA-C, Last Rate: 100 mL/hr at 04/25/23 0531, 2,000 mg at 04/25/23 0531    dabigatran etexilate (PRADAXA) capsule 150 mg, 150 mg, "Oral, Q12H [facility], Bernarda Maxwell PA-C, 150 mg at 23 0859    dexamethasone (DECADRON) tablet 4 mg, 4 mg, Oral, Q8H [facility], [facility], 4 mg at 23 0514    guaiFENesin (MUCINEX) 12 hr tablet 600 mg, 600 mg, Oral, Q12H [facility], [facility], 600 mg at 23 0859    memantine (NAMENDA) tablet 10 mg, 10 mg, Oral, BID, Bernarda Maxwell PA-C, 10 mg at 23 8846    metoprolol succinate (TOPROL-XL) 24 hr tablet 12 5 mg, 12 5 mg, Oral, Daily, Bernarda Maxwell PA-C, 12 5 mg at 23 0859    ondansetron [facility]) injection 4 mg, 4 mg, Intravenous, Q6H PRN, Bernarda Maxwell PA-C    pantoprazole (PROTONIX) EC tablet 40 mg, 40 mg, Oral, Early Morning, Bernarda Maxwell PA-C, 40 mg at 23 1085    prazosin (MINIPRESS) capsule 1 mg, 1 mg, Oral, HS, WILLIAM Meza-C, 1 mg at 23 2130    Medications Prior to Admission   Medication    acetaminophen (TYLENOL) 325 mg tablet    ALPRAZolam (XANAX) 0 5 mg tablet    dabigatran etexilate (PRADAXA) 150 mg capsu    dexamethasone (DECADRON) 4 mg tablet    hydrochlorothiazide (HYDRODIURIL) 25 mg tablet    memantine (Namenda) 10 mg tablet    metoprolol succinate (TOPROL-XL) 25 mg 24 hr tablet    ondansetron (ZOFRAN) 4 mg tablet    pantoprazole (PROTONIX) 40 mg tablet    potassium chloride (K-DUR,KLOR-CON) 20 mEq tablet    prazosin (MINIPRESS) 1 mg capsule    VITAMIN D PO    ascorbic acid (VITAMIN C) 1000 MG tablet    [] ciprofloxacin (CIPRO) 500 mg tablet    memantine (NAMENDA TITRATION PACK)    [] metroNIDAZOLE (FLAGYL) 500 mg tablet    pantoprazole (PROTONIX) 40 mg tablet    [] saccharomyces boulardii (FLORASTOR) 250 mg capsule    sodium chloride, PF, 0 9 %    sulfamethoxazole-trimethoprim (BACTRIM DS) 800-160 mg per tablet       No Known Allergies      Physical Exam:     /91 (BP Location: Left arm)   Pulse 69   Temp 97 9 °F (36 6 °C) (Temporal)   Resp 20   Ht 5' 8\" (1 727 m)   Wt 87 1 kg " (192 lb 0 7 oz)   SpO2 100%   BMI 29 20 kg/m²     Physical Exam  HENT:      Head: Normocephalic  Mouth/Throat:      Mouth: Mucous membranes are dry  Eyes:      Conjunctiva/sclera: Conjunctivae normal    Cardiovascular:      Rate and Rhythm: Normal rate and regular rhythm  Pulmonary:      Effort: Pulmonary effort is normal       Comments: On 2 L of O2 via NC  Abdominal:      General: Bowel sounds are normal    Musculoskeletal:      Right lower leg: Edema present  Left lower leg: Edema present  Skin:     General: Skin is warm and dry  Neurological:      Mental Status: He is alert and oriented to person, place, and time  Motor: Weakness present  Psychiatric:         Behavior: Behavior normal          Thought Content:  Thought content normal            Recent Results (from the past 48 hour(s))   Echo complete w/ contrast if indicated    Collection Time: 04/23/23  2:54 PM   Result Value Ref Range    LA size 3 5 cm    Triscuspid Valve Regurgitation Peak Gradient 25 0 mmHg    Tricuspid valve peak regurgitation velocity 2 50 m/s    LVPWd 1 10 cm    Left Atrium Area-systolic Apical Two Chamber 14 2 cm2    Left Atrium Area-systolic Four Chamber 36 7 cm2    MV E' Tissue Velocity Septal 8 cm/s    Tricuspid annular plane systolic excursion 0 85 cm    TR Peak Marc 2 5 m/s    IVSd 5 25 cm    LV DIASTOLIC VOLUME (MOD BIPLANE) 2D 42 mL    LEFT VENTRICLE SYSTOLIC VOLUME (MOD BIPLANE) 2D 19 mL    Left ventricular stroke volume (2D) 24 00 mL    A4C EF 66 %    LA length (A2C) 4 60 cm    LVIDd 3 20 cm    IVS 1 1 cm    LVIDS 2 30 cm    FS 28 28 - 44 %    Ao root 3 40 cm    RVID d 3 5 cm    MV valve area p 1/2 method 3 33 cm2    E wave deceleration time 229 ms    MV Peak E Marc 49 cm/s    MV Peak A Marc 0 9 m/s    NIALL A4C 15 6 cm2    RAA A4C 12 6 cm2    MV stenosis pressure 1/2 time 66 ms    LVSV, 2D 24 mL    LV EF 65     Est  RA pres 3 0 mmHg    Right Ventricular Peak Systolic Pressure 90 93 mmHg    Asc Ao 3 7 cm Procalcitonin, Next Day AM Collection    Collection Time: 04/24/23  5:08 AM   Result Value Ref Range    Procalcitonin 4 01 (H) <=0 25 ng/ml   CBC and differential    Collection Time: 04/24/23  5:08 AM   Result Value Ref Range    WBC 18 17 (H) 4 31 - 10 16 Thousand/uL    RBC 2 90 (L) 3 88 - 5 62 Million/uL    Hemoglobin 10 5 (L) 12 0 - 17 0 g/dL    Hematocrit 32 6 (L) 36 5 - 49 3 %     (H) 82 - 98 fL    MCH 36 2 (H) 26 8 - 34 3 pg    MCHC 32 2 31 4 - 37 4 g/dL    RDW 13 9 11 6 - 15 1 %    MPV 10 8 8 9 - 12 7 fL    Platelets 426 758 - 130 Thousands/uL    nRBC 1 /100 WBCs    Neutrophils Relative 88 (H) 43 - 75 %    Immat GRANS % 2 0 - 2 %    Lymphocytes Relative 6 (L) 14 - 44 %    Monocytes Relative 4 4 - 12 %    Eosinophils Relative 0 0 - 6 %    Basophils Relative 0 0 - 1 %    Neutrophils Absolute 16 03 (H) 1 85 - 7 62 Thousands/µL    Immature Grans Absolute 0 30 (H) 0 00 - 0 20 Thousand/uL    Lymphocytes Absolute 1 14 0 60 - 4 47 Thousands/µL    Monocytes Absolute 0 66 0 17 - 1 22 Thousand/µL    Eosinophils Absolute 0 01 0 00 - 0 61 Thousand/µL    Basophils Absolute 0 03 0 00 - 0 10 Thousands/µL   Comprehensive metabolic panel    Collection Time: 04/24/23  5:08 AM   Result Value Ref Range    Sodium 136 135 - 147 mmol/L    Potassium 3 8 3 5 - 5 3 mmol/L    Chloride 104 96 - 108 mmol/L    CO2 26 21 - 32 mmol/L    ANION GAP 6 4 - 13 mmol/L    BUN 24 5 - 25 mg/dL    Creatinine 1 18 0 60 - 1 30 mg/dL    Glucose 162 (H) 65 - 140 mg/dL    Calcium 8 3 (L) 8 4 - 10 2 mg/dL    Corrected Calcium 9 3 8 3 - 10 1 mg/dL    AST 22 13 - 39 U/L    ALT 29 7 - 52 U/L    Alkaline Phosphatase 39 34 - 104 U/L    Total Protein 4 8 (L) 6 4 - 8 4 g/dL    Albumin 2 8 (L) 3 5 - 5 0 g/dL    Total Bilirubin 0 54 0 20 - 1 00 mg/dL    eGFR 62 ml/min/1 73sq m   Magnesium    Collection Time: 04/24/23  5:08 AM   Result Value Ref Range    Magnesium 1 8 (L) 1 9 - 2 7 mg/dL       CT abdomen pelvis wo contrast    Result Date: 4/12/2023  Narrative: CT ABDOMEN AND PELVIS WITHOUT IV CONTRAST INDICATION:   Diarrhea Bowel obstruction suspected Diarrhea for 2 weeks with distended abdomen    COMPARISON:  4/30/2022  TECHNIQUE:  CT examination of the abdomen and pelvis was performed without intravenous contrast  Multiplanar 2D reformatted images were created from the source data  Radiation dose length product (DLP) for this visit:  647 59 mGy-cm   This examination, like all CT scans performed in the [facility], was performed utilizing techniques to minimize radiation dose exposure, including the use of iterative  reconstruction and automated exposure control  Enteric contrast was not administered  FINDINGS: ABDOMEN LOWER CHEST:  Moderate coronary artery calcifications  Patchy dependent atelectasis in the right lower lobe  LIVER/BILIARY TREE:  Mild fatty infiltration  No biliary ductal dilatation  GALLBLADDER:  Status post cholecystectomy  SPLEEN:  Status post splenectomy  There are coils seen in the region of the splenic artery  PANCREAS:  Unremarkable  ADRENAL GLANDS:  Unremarkable  KIDNEYS/URETERS:  Unremarkable  No hydronephrosis  There is however a 2 0 x 1 8 cm nodule seen inferior to the right kidney in the right retroperitoneum  This was not seen on the prior CT study and therefore may reflect a metastatic deposit  STOMACH AND BOWEL:  Tiny hiatal hernia  No bowel obstruction  There is colonic diverticulosis  There is a focal area of colonic wall thickening and pericolonic inflammation at the junction of the descending colon and proximal sigmoid colon consistent with mild acute diverticulitis  APPENDIX:  A normal appendix was visualized  ABDOMINOPELVIC CAVITY:  No ascites  No pneumoperitoneum  No other lymphadenopathy  VESSELS:  The abdominal is calcified  No retroperitoneal hematoma  PELVIS REPRODUCTIVE ORGANS:  The prostate is mildly enlarged  URINARY BLADDER:  Unremarkable   ABDOMINAL WALL/INGUINAL REGIONS:  There are "small fat-containing bilateral inguinal hernias right greater than left  There is a small fat-containing periumbilical hernia  OSSEOUS STRUCTURES:  No acute fracture or destructive osseous lesion  Mild compression deformities of several lower thoracic vertebral bodies appears stable  Advanced multilevel degenerative disc disease in the thoracal lumbar spine  Impression: Findings consistent with mild acute diverticulitis junction of the descending colon and proximal sigmoid colon  No bowel obstruction  Nodule in the right retroperitoneum, anterior to the right kidney, new since the prior study  This may reflect a metastatic deposit  Additional findings as above  Workstation performed: [id number]     XR chest 1 view portable    Result Date: 4/22/2023  Narrative: CHEST INDICATION:   SOB  COMPARISON:  CXR 4/11/2023 and chest CT 4/18/2023  EXAM PERFORMED/VIEWS:  XR CHEST PORTABLE  FINDINGS: Cardiomediastinal silhouette normal  Lungs clear  No effusion or pneumothorax  Upper abdomen normal  Embolization coils in the left upper quadrant  Bones normal for age  Impression: No acute cardiopulmonary disease  Workstation performed: [id number]     XR chest 1 view portable    Result Date: 4/12/2023  Narrative: CHEST INDICATION:   weakness  COMPARISON:  7/30/2022 EXAM PERFORMED/VIEWS:  XR CHEST PORTABLE Single view FINDINGS: Low lung volumes accentuate markings at the bases Cardiomediastinal silhouette appears unremarkable  The lungs are clear  No pneumothorax or pleural effusion  Osseous structures appear within normal limits for patient age  Impression: Low lung volumes No acute cardiopulmonary disease  Workstation performed: [id number]     CT chest abdomen pelvis w contrast    Result Date: 4/22/2023  Narrative: CT CHEST, ABDOMEN AND PELVIS WITH IV CONTRAST INDICATION:   SOB, diarrhea/recent diverticulitis/assess for complication     \"78 yo M h/o HTN, PE/portal vein thrombosis on Pradaxa, metastatic melanoma (to " "brain/lung/retroperitoneum), presenting for evaluation of SOB and diarrhea  \"  \"Pt recently admitted for diarrhea/diverticulitis, discharged about 1 week ago, reports sx improved, unsure when they returned  States everything he eats causes immediate diarrhea and is covered in diarrhea on arrival  Denies dark/bloody stools  Reports he thinks he completed his oral abx  \" COMPARISON:  CTA chest PE study 4/18/2023  CT abdomen pelvis 4/11/2023  PET/CT 3/2/2020  TECHNIQUE: CT examination of the chest, abdomen and pelvis was performed  Multiplanar 2D reformatted images were created from the source data  Radiation dose length product (DLP) for this visit:  846 1 mGy-cm   This examination, like all CT scans performed in the [facility], was performed utilizing techniques to minimize radiation dose exposure, including the use of iterative reconstruction and automated exposure control  IV Contrast:  100 mL of iohexol (OMNIPAQUE) Enteric Contrast: Enteric contrast was administered  FINDINGS: CHEST LUNGS:  Field lungs no acute findings  Mild mosaic attenuation of the pulmonary parenchyma keeping with a chronic small vessel or small airway disease  Lung nodule seen on the recent study are unchanged  No endotracheal or endobronchial lesion  PLEURA:  Unremarkable  HEART/GREAT VESSELS: Coronary artery calcifications  No thoracic aortic aneurysm  MEDIASTINUM AND BRENDA:  Unremarkable  CHEST WALL AND LOWER NECK:  Unremarkable  ABDOMEN LIVER/BILIARY TREE:  Multiple scattered enhancing lesions through the liver suspicious for metastatic disease; for example 2 4 cm segment 8 lesion #2/83 2 4 cm segment 7 lesion #2/70  GALLBLADDER:  Gallbladder is surgically absent  SPLEEN:  Status post splenectomy  Left upper quadrant vascular coils are present medial to the splenectomy bed  PANCREAS:  Unremarkable  ADRENAL GLANDS:  Unremarkable  KIDNEYS/URETERS:  Unremarkable  No hydronephrosis   STOMACH AND BOWEL:  Resolved " descending colonic diverticulitis  APPENDIX:  Noninflamed  ABDOMINOPELVIC CAVITY:  No ascites  No pneumoperitoneum  Unchanged 2 1 cm right retroperitoneal nodule posterior to the left kidney, #2/138 there is also a 7 mm retroperitoneal nodule lateral to the right kidney #2/133  Unchanged right extrarenal iliac adenopathy; for example 17 mm right external iliac node #2/181 VESSELS:  Unremarkable for patient's age  PELVIS REPRODUCTIVE ORGANS:  Unremarkable for patient's age  URINARY BLADDER:  Unremarkable  ABDOMINAL WALL/INGUINAL REGIONS:  Bilateral fat-containing inguinal hernias larger on the right  Uncomplicated small fat-containing umbilical hernia  Unchanged right inguinal adenopathy  A dominant node measures 14 mm on #2/250  OSSEOUS STRUCTURES: Known osseous metastases seen on the recent PET/CT are not well visualized on this study  Impression: No acute findings in the chest, abdomen or pelvis  Resolved descending colonic diverticulitis without new bowel findings  Multiple subtle enhancing lesions throughout the liver in keeping with numerous hepatic metastases  Known pulmonary and retroperitoneal metastases  Unchanged right iliac chain and inguinal adenopathy in this patient with metastatic melanoma  Known osseous metastases are not well visualized on this study    The study was marked in EPIC for immediate notification  Workstation performed: [id number]     CTA chest pe study    Result Date: 4/18/2023  Narrative: CTA - CHEST WITH IV CONTRAST - PULMONARY ANGIOGRAM INDICATION:   I26 09: Other pulmonary embolism with acute cor pulmonale R06 02: Shortness of breath  COMPARISON: CTA chest PE study 7/31/2022  TECHNIQUE: CTA examination of the chest was performed using angiographic technique according to a protocol specifically tailored to evaluate for pulmonary embolism  Multiplanar 2D reformatted images were created from the source data   In addition, coronal 3D MIP postprocessing was performed on the acquisition scanner  Radiation dose length product (DLP) for this visit:  417 mGy-cm   This examination, like all CT scans performed in the [facility], was performed utilizing techniques to minimize radiation dose exposure, including the use of iterative reconstruction and automated exposure control  IV Contrast:  85 mL of iohexol (OMNIPAQUE)  FINDINGS: PULMONARY ARTERIAL TREE:  No pulmonary embolus is seen  LUNGS:  New 8 x 5 mm right middle lobe nodule #3/82  New 4 mm left upper lobe nodule #3/76  New 4 mm left upper lobe subpleural nodule #3/80  New 5 mm right lower lobe nodule #3/115  New 6 mm right lower lobe nodule #3/105  No endotracheal or endobronchial lesion  Previously described subtle scattered groundglass opacities/mosaic perfusion are slightly less apparent  These are likely to represent a chronic small vessel or small airway disease  PLEURA:  Unremarkable  HEART/GREAT VESSELS:  Unremarkable for patient's age  No thoracic aortic aneurysm  MEDIASTINUM AND BRENDA:  Unremarkable  CHEST WALL AND LOWER NECK:   Unremarkable  VISUALIZED STRUCTURES IN THE UPPER ABDOMEN:  Left upper quadrant vascular coils  Reidentified hepatic steatosis  OSSEOUS STRUCTURES:  No acute fracture or destructive osseous lesion  Impression: No pulmonary arterial embolism or pulmonary infiltrate/consolidation  New scattered pulmonary nodules suspicious for metastases  The study was marked in [facility] for immediate notification  Workstation performed: [id number]     Echo complete w/ contrast if indicated    Result Date: 4/23/2023  Narrative: Jenna Sharif  Left Ventricle: Left ventricular cavity size is normal  Wall thickness is normal  The left ventricular ejection fraction is 65%  Systolic function is normal  Wall motion is normal  Diastolic function is normal        Labs and pertinent reports reviewed  This note has been generated by voice recognition software system    Therefore, there may be spelling, grammar, and or syntax errors  Please contact if questions arise

## 2023-04-26 ENCOUNTER — TELEPHONE (OUTPATIENT)
Dept: HEMATOLOGY ONCOLOGY | Facility: CLINIC | Age: 68
End: 2023-04-26

## 2023-04-26 ENCOUNTER — TRANSITIONAL CARE MANAGEMENT (OUTPATIENT)
Dept: FAMILY MEDICINE CLINIC | Facility: CLINIC | Age: 68
End: 2023-04-26

## 2023-04-26 NOTE — TELEPHONE ENCOUNTER
Returned call to pt, left VM advising him I would try and call him again later this afternoon to connect

## 2023-04-26 NOTE — TELEPHONE ENCOUNTER
Returned call to pt  Pt feeling worn out from recent hospitalization  Advised him appt will be changed to virtual for tomorrow  Pt voiced understanding

## 2023-04-26 NOTE — TELEPHONE ENCOUNTER
Call Transfer   Who are you speaking with? Patient   If it is not the patient, are they listed on an active communication consent form? NA   Who is the patients HemOnc/SurgOnc provider? Dr Forrest Lazo   What is the reason for this call? Patient was just discharged today and would like to discuss his medical condition   Person/Department that the call was transferred to? Time that call was transferred? Kemi Chatterjee @ 10:04am   Your call will be transferred now  If you receive a voicemail, please leave a detailed message and a member of the team will return your call as soon as possible  Did you relay this information to the caller?  yes

## 2023-04-27 ENCOUNTER — TELEPHONE (OUTPATIENT)
Dept: HEMATOLOGY ONCOLOGY | Facility: CLINIC | Age: 68
End: 2023-04-27

## 2023-04-27 ENCOUNTER — TELEMEDICINE (OUTPATIENT)
Dept: HEMATOLOGY ONCOLOGY | Facility: CLINIC | Age: 68
End: 2023-04-27

## 2023-04-27 DIAGNOSIS — R17 ELEVATED BILIRUBIN: ICD-10-CM

## 2023-04-27 DIAGNOSIS — C43.8 MALIGNANT MELANOMA OF OVERLAPPING SITES (HCC): ICD-10-CM

## 2023-04-27 DIAGNOSIS — D59.10 AUTOIMMUNE HEMOLYTIC ANEMIA (HCC): Primary | ICD-10-CM

## 2023-04-27 DIAGNOSIS — C79.31 METASTASIS TO BRAIN (HCC): ICD-10-CM

## 2023-04-27 NOTE — TELEPHONE ENCOUNTER
Appointment Confirmation   Who are you speaking with? Patient   If it is not the patient, are they listed on an active communication consent form? N/A   Which provider is the appointment scheduled with? Dr Eleanor Vasquez   When is the appointment scheduled? Please list date and time 4/27/23 1030   At which location is the appointment scheduled to take place? Virtual   Did caller verbalize understanding of appointment details?  Yes

## 2023-04-27 NOTE — PROGRESS NOTES
Virtual Brief Visit    This Visit is being completed by telephone  The Patient is located at Home and in the following state in which I hold an active license [place]    The patient was identified by name and date of birth  Melani Toledo was informed that this is a telemedicine visit and that the visit is being conducted through the [facility]  He agrees to proceed     My office door was closed  No one else was in the room  He acknowledged consent and understanding of privacy and security of the video platform  The patient has agreed to participate and understands they can discontinue the visit at any time  Patient is aware this is a billable service  Assessment/Plan: In summary, this is a 68-year-old male with a history of autoimmune hemolytic anemia  He also has melanoma with brain metastases, status post radiation therapy finishing about a month ago  He was recently hospitalized for diverticulitis  He had some improvement returned home, and went back to the hospital with shortness of breath  Etiology unclear  He was treated for sepsis although no further bacteria were noted  He was diuresed with improvement  He has now returned home making gradual improvement over the past 3 to 4 days  During his hospitalization hemoglobin was slightly depressed, 10 3  Bilirubin peak 1 2  I think hemolysis was probably playing little role in his anemia at that time  He was started on dexamethasone  Taper under Dr Lorne Givens  He has follow-up in radiation therapy in 2 weeks  MRI is arranged just prior to that visit  Further treatment for his melanoma will continue under Dr Lorne Givens  I will see him back in 6 months for review  I reviewed the above with the patient  He voiced understanding and agreement  I reviewed his medications, medical conditions, laboratory studies      Problem List Items Addressed This Visit        Other    Autoimmune hemolytic anemia - Primary    Elevated bilirubin Melanoma [facility])       Recent Visits  Date Type Provider Dept   04/26/23 Telephone Jovanna Springer [facility]   Showing recent visits within past 7 days and meeting all other requirements  Today's Visits  Date Type Provider Dept   04/27/23 Telephone Angelodustin Ochoa, [address] [facility]   04/27/23 [address], DO [address] today's visits and meeting all other requirements  Future Appointments  No visits were found meeting these conditions  Showing future appointments within next 150 days and meeting all other requirements         Visit Time  Total Visit Duration: 15 min

## 2023-04-28 LAB
BACTERIA BLD CULT: NORMAL
BACTERIA BLD CULT: NORMAL

## 2023-04-30 PROBLEM — R91.8 GROUND GLASS OPACITY PRESENT ON IMAGING OF LUNG: Status: ACTIVE | Noted: 2023-01-01

## 2023-04-30 PROBLEM — N17.9 AKI (ACUTE KIDNEY INJURY) (HCC): Status: RESOLVED | Noted: 2018-03-29 | Resolved: 2023-01-01

## 2023-04-30 PROBLEM — K57.92 ACUTE DIVERTICULITIS: Status: RESOLVED | Noted: 2023-01-01 | Resolved: 2023-01-01

## 2023-04-30 NOTE — ED PROVIDER NOTES
"History  Chief Complaint   Patient presents with   • Weakness - Generalized     Pt reports gen weakness, sob, diarrhea since admission last week  Jules Gaming is a very pleasant 69-year-old male with an unfortunate past medical history of metastatic melanoma with metastasis to brain  He is coming in today for evaluation of persistent diarrhea, chest tightness and shortness of breath, and generalized weakness  Unfortunately he has been in and out of the hospital multiple times this month  On 4/11 he was admitted for diverticulitis  He was treated conservatively and discharged on 4/14  He returned again on 4/22 for diarrhea and generalized weakness  He was then discharged on 4/25  Patient is wife states that he had about 1 good day after discharge but then diarrhea returned  Additionally he has developed persistent cough and has become much more short of breath with minimal exertion  He denies fevers  He continues to have loose bowel movements  Reports only 2 bowel movements today but notes that they were both \"emergencies  \"  No significant nausea or vomiting  He just feels very weak as well  He is only able to walk perhaps from 1 room to another with some assistance  Prior to Admission Medications   Prescriptions Last Dose Informant Patient Reported? Taking?    ALPRAZolam (XANAX) 0 5 mg tablet Past Month  No Yes   Sig: Take 1 tablet (0 5 mg total) by mouth 2 (two) times a day Take one tablet one hour before scan and bring the second one with you to take right before the MRI if needed   VITAMIN D PO 4/30/2023  Yes Yes   Sig: Take 1,000 Units by mouth daily    acetaminophen (TYLENOL) 325 mg tablet Past Week  No Yes   Sig: Take 2 tablets (650 mg total) by mouth every 6 (six) hours as needed for mild pain   ascorbic acid (VITAMIN C) 1000 MG tablet   No No   Sig: Take 1 tablet (1,000 mg total) by mouth every 12 (twelve) hours for 6 doses   Patient taking differently: Take 1,000 mg by mouth daily Every " other day   dabigatran etexilate (PRADAXA) 150 mg capsu 4/30/2023  No Yes   Sig: Take 1 capsule (150 mg total) by mouth every 12 (twelve) hours   dexamethasone (DECADRON) 4 mg tablet 4/30/2023  No Yes   Sig: Take 1 tablet (4 mg total) by mouth every 8 (eight) hours Please make sure you're on Protonix 40 mg daily for GI prophylaxis   hydrochlorothiazide (HYDRODIURIL) 25 mg tablet 4/30/2023  No Yes   Sig: Take 1 tablet (25 mg total) by mouth daily   memantine (Namenda) 10 mg tablet 4/30/2023  No Yes   Sig: Take 1 tablet (10 mg total) by mouth 2 (two) times a day   metoprolol succinate (TOPROL-XL) 25 mg 24 hr tablet 4/30/2023  No Yes   Sig: Take 0 5 tablets (12 5 mg total) by mouth daily   ondansetron (ZOFRAN) 4 mg tablet Past Month  No Yes   Sig: Take 1 tablet (4 mg total) by mouth every 8 (eight) hours as needed for nausea or vomiting   pantoprazole (PROTONIX) 40 mg tablet 4/30/2023  No Yes   Sig: Take 1 tablet (40 mg total) by mouth daily   potassium chloride (K-DUR,KLOR-CON) 20 mEq tablet 4/30/2023  No Yes   Sig: Take 1 tablet (20 mEq total) by mouth daily   prazosin (MINIPRESS) 1 mg capsule 4/29/2023  Yes Yes   Sig: Take 1 mg by mouth daily at bedtime       Facility-Administered Medications: None       Past Medical History:   Diagnosis Date   • Acute diverticulitis 4/12/2023   • ARABELLA (acute kidney injury) ([facility] ) 3/29/2018   • Anemia 11/02/2016   • Anxiety    • Arthritis    • Autoimmune hemolytic anemia ([facility] )    • Claustrophobia    • COVID 12/2020   • DVT (deep venous thrombosis) (HCC)    • GERD (gastroesophageal reflux disease)    • Hearing loss, right    • Hemolytic anemia (HCC)    • History of transfusion     2018 - no adverse reaction   • Hypertension    • Malignant melanoma of leg, right ([facility] ) 07/01/2022   • Palpitation    • Portal vein thrombosis    • PTSD (post-traumatic stress disorder)    • Pulmonary emboli (HCC)    • Squamous cell skin cancer 12/20/2022    SCCIS- 12/6/22   • Tobacco abuse        Past Surgical History:   Procedure Laterality Date   • CATARACT EXTRACTION Bilateral    • CHOLECYSTECTOMY  07/18/2017   • COLONOSCOPY     • ELBOW ARTHROPLASTY Left     bursectomy   • IR BIOPSY RETROPERITONEUM  3/17/2023   • IR DRAINAGE TUBE CHECK WITH SCLEROSIS  10/06/2022   • IR DRAINAGE TUBE CHECK WITH SCLEROSIS  10/10/2022   • IR DRAINAGE TUBE CHECK WITH SCLEROSIS  10/20/2022   • IR DRAINAGE TUBE CHECK WITH SCLEROSIS  10/27/2022   • IR DRAINAGE TUBE CHECK WITH SCLEROSIS  11/04/2022   • IR DRAINAGE TUBE CHECK WITH SCLEROSIS  11/15/2022   • IR DRAINAGE TUBE CHECK WITH SCLEROSIS  11/25/2022   • IR DRAINAGE TUBE PLACEMENT  09/19/2022   • JOINT REPLACEMENT Right 02/02/2021    knee   • KNEE SURGERY Right     meniscus tear   • LYMPH NODE BIOPSY Right 07/29/2022    Procedure: BIOPSY LYMPH NODE SENTINEL;  Surgeon: Jigar Juarez MD;  [facility];  Service: Surgical Oncology   • MOHS SURGERY Right 12/20/2022    Right dorsal hand SCCIS-Dr Isabel   • AR ARTHRP KNE CONDYLE&PLATU MEDIAL&LAT COMPARTMENTS Right 02/02/2021    Procedure: ARTHROPLASTY KNEE TOTAL;  Surgeon: Jorge Luis Villanueva MD;  [facility];  Service: Orthopedics   • AR ARTHRP KNE CONDYLE&PLATU MEDIAL&LAT COMPARTMENTS Left 11/17/2021    Procedure: TOTAL KNEE REPLACEMENT;  Surgeon: Jorge Luis Villanueva MD;  [facility];  Service: Orthopedics   • AR LAPS SURG CHOLECYSTECTOMY Marie Pert N/A 12/23/2017    Procedure: CHOLECYSTECTOMY LAPAROSCOPIC with cholangiogram;  Surgeon: Priya Weinstein MD;  [facility];  Service: General   • AR SPLENECTOMY TOTAL SEPARATE PROCEDURE N/A 05/18/2017    Procedure: LAPAROSCOPIC HAND ASSIST SPLENECTOMY;  Surgeon: Celeste Shoemaker MD;  [facility];  Service: Surgical Oncology   • SHOULDER SURGERY Left     rotator cuff x4, reconstruction   • SKIN BIOPSY  5 May 2022   • SKIN CANCER EXCISION  29 July 2022   • SKIN LESION EXCISION Right 07/29/2022    Procedure: WIDE EXCISION RIGHT MEDIAL THIGH;  Surgeon: Emigdio Granados MD;  [facility]: [facility];  Service: Surgical Oncology       Family History   Problem Relation Age of Onset   • Cancer Mother    • Breast cancer Mother    • Other Father         CABG   • Heart attack Paternal Grandfather         acute MI     I have reviewed and agree with the history as documented  E-Cigarette/Vaping   • E-Cigarette Use Never User      E-Cigarette/Vaping Substances   • Nicotine No    • THC No    • CBD No    • Flavoring No    • Other No    • Unknown No      Social History     Tobacco Use   • Smoking status: Some Days     Packs/day: 0 00     Years: 5 00     Pack years: 0 00     Types: Cigars, Cigarettes   • Smokeless tobacco: Current     Types: Snuff   • Tobacco comments:     5  a week average   Vaping Use   • Vaping Use: Never used   Substance Use Topics   • Alcohol use: Yes     Alcohol/week: 6 0 standard drinks     Types: 6 Cans of beer per week     Comment: socially   • Drug use: Yes     Types: Marijuana     Comment: medical marijuana       Review of Systems   Constitutional: Positive for chills  Negative for fever  HENT: Negative for ear pain and sore throat  Eyes: Negative for visual disturbance  Respiratory: Positive for chest tightness and shortness of breath  Negative for cough  Cardiovascular: Negative for chest pain and palpitations  Gastrointestinal: Positive for diarrhea  Negative for abdominal pain, nausea and vomiting  Genitourinary: Negative for dysuria and hematuria  Musculoskeletal: Negative for arthralgias and back pain  Skin: Negative for color change and rash  Neurological: Negative for seizures and syncope  All other systems reviewed and are negative  Physical Exam  Physical Exam  Vitals and nursing note reviewed  Constitutional:       General: He is not in acute distress  Appearance: He is well-developed  HENT:      Head: Normocephalic and atraumatic     Eyes:      Conjunctiva/sclera: Conjunctivae normal    Cardiovascular: Rate and Rhythm: Normal rate and regular rhythm  Heart sounds: No murmur heard  Pulmonary:      Comments: Moderate tachypnea and increased work of breathing with oxygen saturations in the upper 80s/lower 90s on room air  This improved with oxygen by nasal cannula  Abdominal:      Palpations: Abdomen is soft  Tenderness: There is no abdominal tenderness  Musculoskeletal:         General: No swelling  Cervical back: Neck supple  Skin:     General: Skin is warm and dry  Capillary Refill: Capillary refill takes less than 2 seconds  Neurological:      General: No focal deficit present  Mental Status: He is alert and oriented to person, place, and time     Psychiatric:         Mood and Affect: Mood normal          Vital Signs  ED Triage Vitals [04/30/23 1454]   Temperature Pulse Respirations Blood Pressure SpO2   98 1 °F (36 7 °C) 104 19 133/63 91 %      Temp Source Heart Rate Source Patient Position - Orthostatic VS BP Location FiO2 (%)   Oral Monitor Sitting Right arm --      Pain Score       No Pain           Vitals:    04/30/23 2003 04/30/23 2125 04/30/23 2203 04/30/23 2220   BP: 135/76 150/90 141/77 154/88   Pulse: 79 92 93 100   Patient Position - Orthostatic VS: Lying Lying Lying          Visual Acuity      ED Medications  Medications   dabigatran etexilate (PRADAXA) capsule 150 mg (has no administration in time range)   dexamethasone (DECADRON) tablet 4 mg (has no administration in time range)   hydrochlorothiazide (HYDRODIURIL) tablet 25 mg (has no administration in time range)   memantine (NAMENDA) tablet 10 mg (has no administration in time range)   metoprolol succinate (TOPROL-XL) 24 hr tablet 12 5 mg (has no administration in time range)   pantoprazole (PROTONIX) EC tablet 40 mg (has no administration in time range)   prazosin (MINIPRESS) capsule 1 mg (1 mg Oral Refused 4/30/23 2243)   acetaminophen (TYLENOL) tablet 650 mg (has no administration in time range)   polyethylene glycol (MIRALAX) packet 17 g (has no administration in time range)   ondansetron (ZOFRAN) injection 4 mg (has no administration in time range)   aluminum-magnesium hydroxide-simethicone (MYLANTA) oral suspension 30 mL (has no administration in time range)   dextromethorphan-guaiFENesin (ROBITUSSIN DM) oral syrup 10 mL (has no administration in time range)   benzonatate (TESSALON PERLES) capsule 100 mg (has no administration in time range)   ipratropium-albuterol (DUO-NEB) 0 5-2 5 mg/3 mL inhalation solution 3 mL (has no administration in time range)   multi-electrolyte (ISOLYTE-S PH 7 4) bolus 1,000 mL (0 mL Intravenous Stopped 4/30/23 2100)   iohexol (OMNIPAQUE) 350 MG/ML injection (SINGLE-DOSE) 100 mL (100 mL Intravenous Given 4/30/23 1751)   albuterol inhalation solution 5 mg (5 mg Nebulization Given 4/30/23 2201)   ipratropium (ATROVENT) 0 02 % inhalation solution 0 5 mg (0 5 mg Nebulization Given 4/30/23 2201)       Diagnostic Studies  Results Reviewed     Procedure Component Value Units Date/Time    HS Troponin I 4hr [[id number]]  (Normal) Collected: 04/30/23 2003    Lab Status: Final result Specimen: Blood from Arm, Left Updated: 04/30/23 2034     hs TnI 4hr 8 ng/L      Delta 4hr hsTnI 1 ng/L     Lactic acid 2 Hours [[id number]]  (Normal) Collected: 04/30/23 1849    Lab Status: Final result Specimen: Blood from Arm, Left Updated: 04/30/23 1917     LACTIC ACID 1 1 mmol/L     Narrative:      Result may be elevated if tourniquet was used during collection      HS Troponin I 2hr [[id number]]  (Normal) Collected: 04/30/23 1754    Lab Status: Final result Specimen: Blood from Arm, Left Updated: 04/30/23 1824     hs TnI 2hr 6 ng/L      Delta 2hr hsTnI -1 ng/L     FLU/RSV/COVID - if FLU/RSV clinically relevant [[id number]]  (Normal) Collected: 04/30/23 1614    Lab Status: Final result Specimen: Nares from Nose Updated: 04/30/23 3630     SARS-CoV-2 Negative     INFLUENZA A PCR Negative     INFLUENZA B PCR Negative     RSV PCR Negative    Narrative:      FOR PEDIATRIC PATIENTS - copy/paste COVID Guidelines URL to browser: [email or web address]    SARS-CoV-2 assay is a Nucleic Acid Amplification assay intended for the  qualitative detection of nucleic acid from SARS-CoV-2 in nasopharyngeal  swabs  Results are for the presumptive identification of SARS-CoV-2 RNA  Positive results are indicative of infection with SARS-CoV-2, the virus  causing COVID-19, but do not rule out bacterial infection or co-infection  with other viruses  Laboratories within the [place] and its  territories are required to report all positive results to the appropriate  public health authorities  Negative results do not preclude SARS-CoV-2  infection and should not be used as the sole basis for treatment or other  patient management decisions  Negative results must be combined with  clinical observations, patient history, and epidemiological information  This test has not been FDA cleared or approved  This test has been authorized by FDA under an Emergency Use Authorization  (EUA)  This test is only authorized for the duration of time the  declaration that circumstances exist justifying the authorization of the  emergency use of an in vitro diagnostic tests for detection of SARS-CoV-2  virus and/or diagnosis of COVID-19 infection under section 564(b)(1) of  the Act, 21 U  S C  067PWF-1(M)(9), unless the authorization is terminated  or revoked sooner  The test has been validated but independent review by FDA  and CLIA is pending  Test performed using [employer] GeneXpert: This RT-PCR assay targets N2,  a region unique to SARS-CoV-2  A conserved region in the E-gene was chosen  for pan-Sarbecovirus detection which includes SARS-CoV-2  According to CMS-2020-01-R, this platform meets the definition of high-throughput technology      B-Type Natriuretic Peptide(BNP) [[id number]]  (Normal) Collected: 04/30/23 1539    Lab Status: Final result Specimen: Blood from Arm, Left Updated: 04/30/23 1655     BNP 26 pg/mL     Manual Differential(PHLEBS Do Not Order) [[id number]]  (Abnormal) Collected: 04/30/23 1539    Lab Status: Final result Specimen: Blood from Arm, Left Updated: 04/30/23 1631     Segmented % 86 %      Bands % 1 %      Lymphocytes % 7 %      Monocytes % 2 %      Eosinophils, % 3 %      Basophils % 0 %      Myelocytes % 1 %      Absolute Neutrophils 15 55 Thousand/uL      Lymphocytes Absolute 1 25 Thousand/uL      Monocytes Absolute 0 36 Thousand/uL      Eosinophils Absolute 0 54 Thousand/uL      Basophils Absolute 0 00 Thousand/uL      Total Counted --     RBC Morphology Normal     Platelet Estimate Adequate    Lactic acid, plasma (w/reflex if result > 2 0) [[id number]]  (Abnormal) Collected: 04/30/23 1539    Lab Status: Final result Specimen: Blood from Arm, Left Updated: 04/30/23 1619     LACTIC ACID 2 4 mmol/L     Narrative:      Result may be elevated if tourniquet was used during collection      HS Troponin 0hr (reflex protocol) [[id number]]  (Normal) Collected: 04/30/23 1539    Lab Status: Final result Specimen: Blood from Arm, Left Updated: 04/30/23 1610     hs TnI 0hr 7 ng/L     Comprehensive metabolic panel [[id number]]  (Abnormal) Collected: 04/30/23 1539    Lab Status: Final result Specimen: Blood from Arm, Left Updated: 04/30/23 1606     Sodium 135 mmol/L      Potassium 3 0 mmol/L      Chloride 101 mmol/L      CO2 24 mmol/L      ANION GAP 10 mmol/L      BUN 31 mg/dL      Creatinine 1 25 mg/dL      Glucose 233 mg/dL      Calcium 8 6 mg/dL      Corrected Calcium 9 3 mg/dL      AST 38 U/L      ALT 39 U/L      Alkaline Phosphatase 67 U/L      Total Protein 5 1 g/dL      Albumin 3 1 g/dL      Total Bilirubin 0 84 mg/dL      eGFR 58 ml/min/1 73sq m     Narrative:      [place] guidelines for Chronic Kidney Disease (CKD):   •  Stage 1 with normal or high GFR (GFR > 90 mL/min/1 73 square meters)  •  Stage 2 Mild CKD (GFR = 60-89 mL/min/1 73 square meters)  •  Stage 3A Moderate CKD (GFR = 45-59 mL/min/1 73 square meters)  •  Stage 3B Moderate CKD (GFR = 30-44 mL/min/1 73 square meters)  •  Stage 4 Severe CKD (GFR = 15-29 mL/min/1 73 square meters)  •  Stage 5 End Stage CKD (GFR <15 mL/min/1 73 square meters)  Note: GFR calculation is accurate only with a steady state creatinine    Lipase [[id number]]  (Normal) Collected: 04/30/23 1539    Lab Status: Final result Specimen: Blood from Arm, Left Updated: 04/30/23 1606     Lipase 47 u/L     CBC and differential [[id number]]  (Abnormal) Collected: 04/30/23 1539    Lab Status: Final result Specimen: Blood from Arm, Left Updated: 04/30/23 1553     WBC 17 87 Thousand/uL      RBC 3 31 Million/uL      Hemoglobin 11 9 g/dL      Hematocrit 35 8 %       fL      MCH 36 0 pg      MCHC 33 2 g/dL      RDW 13 9 %      MPV 10 0 fL      Platelets 303 Thousands/uL     Narrative: This is an appended report  These results have been appended to a previously verified report  POCT urinalysis dipstick [[id number]]     Lab Status: No result Specimen: Urine     Clostridium difficile toxin by PCR with EIA [[id number]]     Lab Status: No result Specimen: Stool                  PE Study with CT Abdomen and Pelvis with contrast   Final Result by Martha Thomas MD (04/30 2027)   1  No pulmonary artery emboli  2   New bilateral lung groundglass opacities and infiltrates, likely inflammatory/infectious  3  Scattered small lung nodules again visualized, suspicious for metastases  4  Scattered liver lesions again visualized, most concerning for metastases  5  Multiple intra-abdominal and right inguinal nodules, likely metastatic  6  Known osseous metastases better demonstrated on prior PET/CT                          Workstation performed: [id number]         XR chest 1 view portable    (Results Pending)              Procedures  CriticalCare Time    Date/Time: 4/30/2023 10:43 PM  Performed by: Adri Mast MD  Authorized by: Adri Mast MD     Critical care provider statement:     Critical care time (minutes):  50    Critical care time was exclusive of:  Separately billable procedures and treating other patients and teaching time    Critical care was necessary to treat or prevent imminent or life-threatening deterioration of the following conditions:  Respiratory failure    Critical care was time spent personally by me on the following activities:  Blood draw for specimens, obtaining history from patient or surrogate, development of treatment plan with patient or surrogate, discussions with consultants, evaluation of patient's response to treatment, examination of patient, ordering and performing treatments and interventions, ordering and review of laboratory studies, ordering and review of radiographic studies, review of old charts and re-evaluation of patient's condition    I assumed direction of critical care for this patient from another provider in my specialty: no    Comments:      Acute respiratory failure with hypoxia requiring oxygen by nasal cannula  ED Course                               SBIRT 20yo+    Flowsheet Row Most Recent Value   Initial Alcohol Screen: US AUDIT-C     1  How often do you have a drink containing alcohol? 0 Filed at: 04/30/2023 1543   2  How many drinks containing alcohol do you have on a typical day you are drinking? 0 Filed at: 04/30/2023 1543   3b  FEMALE Any Age, or MALE 65+: How often do you have 4 or more drinks on one occassion? 0 Filed at: 04/30/2023 1543   Audit-C Score 0 Filed at: 04/30/2023 1543   LUMA: How many times in the past year have you    Used an illegal drug or used a prescription medication for non-medical reasons?  Never Filed at: 04/30/2023 1543                    Medical Decision Making  54-year-old male with unfortunate past medical history of metastatic melanoma here with increased weakness, continued diarrhea, and hypoxia  On review of the medical records it appears that a C  difficile PCR test initiated at the time of patient's most recent discharge eventually did result as positive  We will need admission to the hospital for treatment of his hypoxia as well as treatment of his C  difficile diarrhea  Clostridium difficile diarrhea: acute illness or injury  Generalized weakness: acute illness or injury  Hypoxia: acute illness or injury  Amount and/or Complexity of Data Reviewed  Labs: ordered  Decision-making details documented in ED Course  Radiology: ordered and independent interpretation performed  Details: Chest x-ray was independently interpreted by me  There appears to be diffuse groundglass opacities bilaterally  Risk  Prescription drug management  Decision regarding hospitalization  Disposition  Final diagnoses:   Generalized weakness   Hypoxia   Clostridium difficile diarrhea     Time reflects when diagnosis was documented in both MDM as applicable and the Disposition within this note     Time User Action Codes Description Comment    4/30/2023  6:09 PM Simone Mera Add [R53 1] Generalized weakness     4/30/2023  6:09 PM Simone Mera Add [R09 02] Hypoxia     4/30/2023 10:45 PM Simone Mera Add [A04 72] Clostridium difficile diarrhea       ED Disposition     ED Disposition   Admit    Condition   Stable    Date/Time   Sun Apr 30, 2023  6:09 PM    Comment   Case was discussed with  and the patient's admission status was agreed to be Admission Status: inpatient status to the service of Dr Merlinda Czar             Follow-up Information    None         Current Discharge Medication List      CONTINUE these medications which have NOT CHANGED    Details   acetaminophen (TYLENOL) 325 mg tablet Take 2 tablets (650 mg total) by mouth every 6 (six) hours as needed for mild pain  Qty:  , Refills: 0    Associated Diagnoses: Primary localized osteoarthrosis of the knee, right      ALPRAZolam (XANAX) 0 5 mg tablet Take 1 tablet (0 5 mg total) by mouth 2 (two) times a day Take one tablet one hour before scan and bring the second one with you to take right before the MRI if needed  Qty: 2 tablet, Refills: 0    Associated Diagnoses: Malignant melanoma of leg, right (HCC)      dabigatran etexilate (PRADAXA) 150 mg capsu Take 1 capsule (150 mg total) by mouth every 12 (twelve) hours  Qty: 60 capsule, Refills: 0    Comments: 75mg BID x 3 days and then resume 150mg BID  Cut tablets in 1/2 from home dose  Associated Diagnoses: Primary localized osteoarthrosis of the knee, right      dexamethasone (DECADRON) 4 mg tablet Take 1 tablet (4 mg total) by mouth every 8 (eight) hours Please make sure you're on Protonix 40 mg daily for GI prophylaxis  Qty: 42 tablet, Refills: 0    Associated Diagnoses: Metastatic melanoma ([facility] ); Metastasis to brain (HCC)      hydrochlorothiazide (HYDRODIURIL) 25 mg tablet Take 1 tablet (25 mg total) by mouth daily  Qty: 30 tablet, Refills: 5    Associated Diagnoses: Essential hypertension      memantine (Namenda) 10 mg tablet Take 1 tablet (10 mg total) by mouth 2 (two) times a day  Qty: 60 tablet, Refills: 1    Associated Diagnoses: Metastatic melanoma (HCC)      metoprolol succinate (TOPROL-XL) 25 mg 24 hr tablet Take 0 5 tablets (12 5 mg total) by mouth daily  Qty: 30 tablet, Refills: 5    Associated Diagnoses: Essential hypertension      ondansetron (ZOFRAN) 4 mg tablet Take 1 tablet (4 mg total) by mouth every 8 (eight) hours as needed for nausea or vomiting  Qty: 20 tablet, Refills: 0    Associated Diagnoses: High risk medication use; Nausea;  Malignant melanoma of leg, right (HCC)      pantoprazole (PROTONIX) 40 mg tablet Take 1 tablet (40 mg total) by mouth daily  Qty: 30 tablet, Refills: 1    Associated Diagnoses: Metastasis to brain (HCC)      potassium chloride (K-DUR,KLOR-CON) 20 mEq tablet Take 1 tablet (20 mEq total) by mouth daily  Qty: 30 tablet, Refills: 3    Associated Diagnoses: Hypokalemia      prazosin (MINIPRESS) 1 mg capsule Take 1 mg by mouth daily at bedtime       VITAMIN D PO Take 1,000 Units by mouth daily       ascorbic acid (VITAMIN C) 1000 MG tablet Take 1 tablet (1,000 mg total) by mouth every 12 (twelve) hours for 6 doses  Qty: 6 tablet, Refills: 0    Associated Diagnoses: COVID-19 virus infection             No discharge procedures on file      PDMP Review       Value Time User    PDMP Reviewed  Yes 4/30/2023 10:09 PM Tatyana Underwood PA-C          ED Provider  Electronically Signed by           Giovanny Allison MD  04/30/23 7423

## 2023-05-01 ENCOUNTER — TELEPHONE (OUTPATIENT)
Dept: HEMATOLOGY ONCOLOGY | Facility: CLINIC | Age: 68
End: 2023-05-01

## 2023-05-01 ENCOUNTER — ANESTHESIA EVENT (INPATIENT)
Dept: GASTROENTEROLOGY | Facility: HOSPITAL | Age: 68
End: 2023-05-01

## 2023-05-01 NOTE — PROGRESS NOTES
Discussed with pulmonology  Recommend broadening antibiotics to vancomycin and cefepime with ID consult  Planning for bronchoscopy tomorrow

## 2023-05-01 NOTE — CASE MANAGEMENT
Case Management Assessment & Discharge Planning Note    Patient name Bernard Solano  Location [address] [address] Camilo Kwan-* MRN [id number]  : 1955 Date 2023       Current Admission Date: 2023  Current Admission Diagnosis:Acute respiratory failure with hypoxia [facility])   Patient Active Problem List    Diagnosis Date Noted   • Ground glass opacity present on imaging of lung 2023   • Generalized weakness 2023   • Watery diarrhea 2023   • Melanoma ([address] ) 2023   • Encounter for follow-up surveillance of melanoma 2023   • Lymphocele after surgical procedure 10/06/2022   • Elevated serum creatinine 2022   • Elevated bilirubin 2022   • Leukocytosis 2022   • Dyspnea 2022   • Acute respiratory failure with hypoxia ([address] ) 2022   • Personal history of malignant melanoma 2022   • Hypercholesterolemia 2021   • Chronic bilateral low back pain with bilateral sciatica 10/08/2021   • Hyperglycemia 2021   • Primary localized osteoarthrosis of the knee, right 2021   • Thrombocytosis 2021   • COVID-19 2020   • Pain in joint, lower leg 11/10/2020   • Primary osteoarthritis of left knee 2020   • Pain in left knee 2020   • Auto immune neutropenia (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   • Glucose intolerance (impaired glucose tolerance) 2020   • Renal insufficiency 2020   • Essential hypertension 2019   • Posttraumatic stress disorder 10/28/2019   • Iron overload due to repeated red blood cell transfusions 2018   • Sepsis ([address] ) 2018   • Autoimmune hemolytic anemia    • Shortness of breath 2017   • Gastroesophageal reflux disease 2017   • Elevated blood pressure reading without diagnosis of hypertension 2017   • Portal vein thrombosis 2017   • History of pulmonary embolism 2017   • Splenomegaly 2017   • Symptomatic anemia 2017   • 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   • Hyperbilirubinemia 11/02/2016      LOS (days): 1  Geometric Mean LOS (GMLOS) (days): 5 20  Days to GMLOS:4 5     OBJECTIVE:  PATIENT READMITTED TO HOSPITAL  Risk of Unplanned Readmission Score: 23 37         Current admission status: Inpatient       [facility] Pharmacy:   [id number] [address] [facility] [address]  [address] [place] [address]  Phone: [phone] Fax: [phone]    Primary Care Provider: Nurys Aleman DO    Primary Insurance: MEDICARE  Secondary Insurance: AAR    ASSESSMENT:  [address] 26 Proxies    There are no active Health Care Proxies on file  Advance Directives  Does patient have a [address]?: Yes  Does patient have Advance Directives?: Yes  Advance Directives: Living will, Power of  for health care, Power of  for finance  Primary Contact: Alfreda Sharmin [phone]    Readmission Root Cause  30 Day Readmission: Yes  Patient was readmitted due to: Acute Respiratory Failure    Patient Information  Admitted from[de-identified] Home  Mental Status: Alert  During Assessment patient was accompanied by: Not accompanied during assessment  Assessment information provided by[de-identified] Spouse  Primary Caregiver: Self  Support Systems: Family members  What city do you live in?: [address] entry access options   Select all that apply : No steps to enter home  Type of Current Residence: 2 story home  Upon entering residence, is there a bedroom on the main floor (no further steps)?: No  A bedroom is located on the following floor levels of residence (select all that apply):: 2nd Floor  Upon entering residence, is there a bathroom on the main floor (no further steps)?: No  Indicate which floors of current residence have a bathroom (select all the apply):: 2nd Floor  Number of steps to 2nd floor from main floor: One Flight  Homeless/housing insecurity resource given?: N/A  Living Arrangements: Lives w/ Spouse/significant other    Activities of Daily Living Prior to Admission  Functional Status: Independent  Completes ADLs independently?: Yes  Ambulates independently?: Yes  Does patient use assisted devices?: No  Does patient currently own DME?: Yes  What DME does the patient currently own?: Straight [place], Walker, Bedside Commode, Shower Chair, Wheelchair  Does patient have a history of Outpatient Therapy (PT/OT)?: No  Does the patient have a history of Short-Term Rehab?: No  Does patient have a history of HHC?: No  Does patient currently have [place] [id number]?: No    Patient Information Continued  Income Source: Pension/senior care  Does patient have prescription coverage?: Yes  Does patient have a history of substance abuse?: No  Does patient have a history of Mental Health Diagnosis?: No    Means of Transportation  Means of Transport to [facility][de-identified] Family transport        DISCHARGE DETAILS:    Discharge planning discussed with[de-identified] patient's spouse Jeremiah Leslie       Contacts  Patient Contacts: Jeremiah Leslie  Relationship to Patient[de-identified] Family  Contact Method: Phone  Phone Number: [phone]  Reason/Outcome: Emergency Contact, Discharge Planning    Additional Comments: Patient not on chronic oxygen, spouse states patient has been very weak lately

## 2023-05-01 NOTE — ASSESSMENT & PLAN NOTE
Hx of diverticulitis, C  Diff in the past  Remains with intermittent episodes of watery diarrhea but improving today  Without diverticulitis on CT scan     Plan:  • Prior C   Diff PCR positive but toxins negative, probable colonization   • Monitor with use of antibiotics

## 2023-05-01 NOTE — ASSESSMENT & PLAN NOTE
Hx of diverticulitis, C  Diff in the past  Remains with intermittent episodes of watery diarrhea but improving today  Without diverticulitis on CT scan     Plan:  • Prior C  Diff PCR positive but toxins negative, probable colonization  • C  Diff ordered, results now pending    • Monitor with use of antibiotics

## 2023-05-01 NOTE — ASSESSMENT & PLAN NOTE
Presents with nonproductive cough, chest tightness, shortness of breath, generalized weakness  Recent admission for diverticulitis  • VS: afebrile, normotensive, tachycardia, tachypnea, new o2 on 3L progressed to midflow  • Labs: CBC chronic leukocytosis, BMP hypokalemia/hypomagnesemia, lactate 2 4>1 1, BNP 26, trop wnl   • Imaging: CTA C/A/P: no PE, new bilateral GGO, scattered pulmonary/liver/inguinal nodules, osseous metastasis   • Initially on 3LNC, progressed to midflow on floor, monitor for progression to HFNC/Bipap  Lungs sound clear without wheezing/crackles  Does not examine volume overloaded  Plan:   • Continue supplemental O2, maintain O2 sat> 92%    Currently on 8L NC, wean as able  • ABX: Continue ceftriaxone/azithromycin day#2, narrow as appropriate   • Diet: regular  • Lines/Drains/Tubes: peripheral IV  • Pulmonology consulted, appreciate recommendations

## 2023-05-01 NOTE — ASSESSMENT & PLAN NOTE
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

## 2023-05-01 NOTE — ASSESSMENT & PLAN NOTE
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

## 2023-05-01 NOTE — CONSULTS
Medical Oncology/Hematology Consult Note  Maggi Sawyer, male, 70 y o , 1955,  [place] /E4 -[phone]     Reason for consultation: autoimmune hemolytic anemia, metastatic melanoma    Patient is a 75 y  o  male with PMH of autoimmune hemolytic anemia followed by Dr Tita Phoenix, and metastatic melanoma followed by Dr Julio Schmitt  He is currently on adjuvant encorafenib and binimetinib for his melanoma  He presented to the ED with shortness of breath and diarrhea since last admission  He was admitted for acute diverticulitis and was discharged last week, 4/14/23  CT imaging showing diverticulitis has resolved  Hematology-oncology was consulted to offer recommendations for any treatment while inpatient  Assessment and Plan:  1  Metastatic melanoma   -Biopsy (3/17/2023) R peritoneum consistent with malignant melanoma  Metastatic to brain   -Has BRAF mutation, therefore, placed on encorafenib and binimetinib since the beginning of April 2023  -MRI brain (3/4/23)- Too numerous to count metastatic lesions in bilateral cerebral hemispheres and to a lesser extent in bilateral cerebellum with perilesional vasogenic edema (bilateral frontal, bilateral parietal, right subinsular, left temporal, and bilateral occipital lobes - worse in right frontal lobe)  -CT C/A/P (4/22/23) -Multiple subtle enhancing lesions throughout the liver in keeping with numerous hepatic metastases  Known pulmonary and retroperitoneal metastases   Unchanged right iliac chain and inguinal adenopathy in this patient with metastatic melanoma   Known osseous metastases are not well visualized on this study  CT A/P (4/30/23)-  New bilateral lung groundglass opacities and infiltrates, likely inflammatory/infectious  Scattered small lung nodules again visualized, suspicious for metastases  Scattered liver lesions again visualized, most concerning for metastases   Multiple intra-abdominal and right inguinal nodules, likely metastatic  Known osseous metastases     2  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  -Was on Rituxan in the past, last received on 8/2022   -Currently on chronic prednisone treatment, dexamethasone 4 mg every 8 hours  -Hemoglobin stable:  11 9 (5/1/23) <10 5 (4/24) <1 7<14 0 (admission date)<12 3  -, MCHC 33 2, macrocytic, normochromic  -Vitamin B-12 815  (3/11/21)   -Folate 8 6 (3/11/21)   -Leukocytosis secondary to his chronic steroids exacerbated by his respiratory inflammatory process as outlined below    3  Acute respiratory failure with hypoxia  -Presented with nonproductive cough, chest tightness, shortness of breath, generalized weakness    -Imaging showed new bilateral groundglass opacities, scattered pulmonary/liver/inguinal nodules  -Of note, currently on 13 L of O2 via mid flow  -Pulmonology planning to do bronchoscopy, 5/2/23    PLAN:  1) Discontinue encorafenib (Braftovi) and binimetinib (Mektovi) while inpatient  2) May continue with dexamethasone 4 mg q8 hours for his hx of hemolytic anemia  3) Transfuse as needed for hemoglobin <7 g/dL  Patient's hemoglobin stable at 11 9 g/dL  No signs of bleeding  Denies hematemesis, hematochezia, hematuria, epistaxis  Treatment as above with steroid  Ordered vitamin B12, folate, iron panel  4) Patient planned for bronchoscopy tomorrow  5) Patient may benefit from introduction of service palliative medicine for continuum of care along with symptom management  Patient has had multiple hospitalizations with his chronic diarrhea; appears to have some coping difficulties as well  Interval history:  Patient reported feeling very disappointed and anxious that he is back in the hospital again  He was just discharged last week for treatment of his diverticulitis  Patient's biggest concern was his hemoglobin slowly dropping  Reiterated that his hemoglobin is stable at 11 9 and he is still currently taking his dexamethasone 4 mg every 8 hours    Discussed that we will hold his melanoma medications BRAF/MEK as we have done in the past hospitalizations  We briefly discussed results of his CT imaging that was completed on 4/30/2023  He admitted not being entirely open to discussing it at this time  He did express that he would like to focus on his acute shortness of breath for suspicions of pneumonia  He understands that he has a planned bronchoscopy tomorrow  Outpatient follow up plan:   Appointment with Dr Fredrick Samson on 5/3/2023  This was canceled by the patient  Will be rescheduled by inpatient Cancer Coordinator, NOAM Darby    Appointment with Dr Rubia Headley on 11/6/2023  Thank you for this consult  Toño Diaz, MIKO, VISHNU  Hematology-Oncology     History of present illness:  Filipe Inman is a 70 y o  male  with PMH of hypertension, GERD, hyperglycemia, hypercholesterolemia  He is currently being followed by Dr Robinette Aschoff for his 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 and Dr Gypsy Batista for his melanoma  He presented to the ED with stool incontinence  He reported recurrence of diarrhea since last admission; recently admitted for acute diverticulitis 4/11-4/14, now resolved on CT imaging  For his autoimmune hemolytic anemia, he has been taking prednisone chronically with weekly CBCs  For his metatastic melanoma, he's currently on adjuvant encorafenib and binimetinib  he was recently admitted on 04/11/23 for diverticulitis, then 04/22/23 again for diarrhea/ARABELLA  He was then discharged on 04/25/23  From last admission, hematology-oncology was consulted for patient is concerned about his hemolytic anemia  Discussed that his hemoglobin has been stable then, hemoglobin stable as well for this admission  Patient presented to the ED on 4/30/2023 for shortness of breath, weakness, diarrhea  Review of Systems:   Review of Systems   Constitutional: Positive for activity change and fatigue  Negative for chills and fever     HENT: Negative for ear pain and sore throat  Eyes: Negative for pain and visual disturbance  Respiratory: Positive for shortness of breath  Negative for cough  Cardiovascular: Negative for chest pain and palpitations  Gastrointestinal: Positive for diarrhea  Negative for abdominal pain and vomiting  Genitourinary: Negative for dysuria and hematuria  Musculoskeletal: Negative for arthralgias and back pain  Skin: Negative for color change and rash  Neurological: Positive for weakness  Negative for seizures and syncope  Psychiatric/Behavioral: The patient is nervous/anxious  All other systems reviewed and are negative  Oncology History:   Cancer Staging   Personal history of malignant melanoma  Staging form: Melanoma of the Skin, AJCC 8th Edition  - Clinical stage from 5/31/2022: Stage IIB (cT4a, cN0, cM0) - Signed by Jesus Cope MD on 8/25/2022  - Pathologic stage from 7/29/2022: Stage IIIC (pT4a, pN1a, cM0) - Signed by Jesus Cope MD on 8/25/2022    Oncology History   Personal history of malignant melanoma   5/31/2022 Biopsy    Right Leg, Shave Biopsy   Melanoma extending to the deep specimen margin  Thickness: 7 0mm  Ulceration: not seen   Mitoses: 3      5/31/2022 -  Cancer Staged    Staging form: Melanoma of the Skin, AJCC 8th Edition  - Clinical stage from 5/31/2022: Stage IIB (cT4a, cN0, cM0) - Signed by Jesus Cope MD on 8/25/2022 7/1/2022 Initial Diagnosis    Malignant melanoma of leg, right ([address] )     7/29/2022 Surgery    A  Lymph node, sentinel, #1:  One lymph node with rare metastatic melanoma cells (1/1), subcapsular location  Immunohistochemical study for MART-1, HMB45 and S100 supports the findings  B  Leg, right, knee, wide excision:  Residual melanoma, nodular type, and scar; margins free  See synoptic report       7/29/2022 -  Cancer Staged    Staging form: Melanoma of the Skin, AJCC 8th Edition  - Pathologic stage from 7/29/2022: Stage IIIC (pT4a, pN1a, cM0) - Signed by Luis Lundy MD on 8/25/2022 12/6/2022 Biopsy    Final Diagnosis   A  Skin, Right dorsal hand, Shave biopsy:  Squamous cell carcinoma in-situ, at least; extending to biopsy margins               Past Medical History:   Past Medical History:   Diagnosis Date   • Acute diverticulitis 4/12/2023   • ARABELLA (acute kidney injury) ([facility] [address] ) 3/29/2018   • Anemia 11/02/2016   • Anxiety    • Arthritis    • Autoimmune hemolytic anemia ([facility] [address] )    • Claustrophobia    • COVID 12/2020   • DVT (deep venous thrombosis) (HCC)    • GERD (gastroesophageal reflux disease)    • Hearing loss, right    • Hemolytic anemia (HCC)    • History of transfusion     2018 - no adverse reaction   • Hypertension    • Malignant melanoma of leg, right ([facility] [address] ) 07/01/2022   • Palpitation    • Portal vein thrombosis    • PTSD (post-traumatic stress disorder)    • Pulmonary emboli (HCC)    • Squamous cell skin cancer 12/20/2022    SCCIS- 12/6/22   • Tobacco abuse        Past Surgical History:   Procedure Laterality Date   • CATARACT EXTRACTION Bilateral    • CHOLECYSTECTOMY  07/18/2017   • COLONOSCOPY     • ELBOW ARTHROPLASTY Left     bursectomy   • IR BIOPSY RETROPERITONEUM  3/17/2023   • IR DRAINAGE TUBE CHECK WITH SCLEROSIS  10/06/2022   • IR DRAINAGE TUBE CHECK WITH SCLEROSIS  10/10/2022   • IR DRAINAGE TUBE CHECK WITH SCLEROSIS  10/20/2022   • IR DRAINAGE TUBE CHECK WITH SCLEROSIS  10/27/2022   • IR DRAINAGE TUBE CHECK WITH SCLEROSIS  11/04/2022   • IR DRAINAGE TUBE CHECK WITH SCLEROSIS  11/15/2022   • IR DRAINAGE TUBE CHECK WITH SCLEROSIS  11/25/2022   • IR DRAINAGE TUBE PLACEMENT  09/19/2022   • JOINT REPLACEMENT Right 02/02/2021    knee   • KNEE SURGERY Right     meniscus tear   • LYMPH NODE BIOPSY Right 07/29/2022    Procedure: BIOPSY LYMPH NODE SENTINEL;  Surgeon: Robert Washington MD;  [facility];  Service: Surgical Oncology   • MOHS SURGERY Right 12/20/2022    Right dorsal hand MIRIAM-Dr Isabel   • RI ARTHRP FLORIN CONDYLE&PLATU MEDIAL&LAT COMPARTMENTS Right 02/02/2021    Procedure: ARTHROPLASTY KNEE TOTAL;  Surgeon: Jemal Waters MD;  [facility];  Service: Orthopedics   • MO ARTHRP KNE CONDYLE&PLATU MEDIAL&LAT COMPARTMENTS Left 11/17/2021    Procedure: TOTAL KNEE REPLACEMENT;  Surgeon: Jemal Waters MD;  [facility];  Service: Orthopedics   • MO LAPS SURG CHOLECYSTECTOMY Jennett Ana Maria N/A 12/23/2017    Procedure: CHOLECYSTECTOMY LAPAROSCOPIC with cholangiogram;  Surgeon: Josy Walker MD;  [facility];  Service: General   • MO SPLENECTOMY TOTAL SEPARATE PROCEDURE N/A 05/18/2017    Procedure: LAPAROSCOPIC HAND ASSIST SPLENECTOMY;  Surgeon: Loretta Aguila MD;  [facility];  Service: Surgical Oncology   • SHOULDER SURGERY Left     rotator cuff x4, reconstruction   • SKIN BIOPSY  5 May 2022   • SKIN CANCER EXCISION  29 July 2022   • SKIN LESION EXCISION Right 07/29/2022    Procedure: WIDE EXCISION RIGHT MEDIAL THIGH;  Surgeon: Cammy Deras MD;  [facility];  Service: Surgical Oncology       Family History   Problem Relation Age of Onset   • Cancer Mother    • Breast cancer Mother    • Other Father         CABG   • Heart attack Paternal Grandfather         acute MI       Social History     Socioeconomic History   • Marital status: /Civil Union     Spouse name: None   • Number of children: None   • Years of education: None   • Highest education level: None   Occupational History   • None   Tobacco Use   • Smoking status: Some Days     Packs/day: 0 00     Years: 5 00     Pack years: 0 00     Types: Cigars, Cigarettes   • Smokeless tobacco: Current     Types: Snuff   • Tobacco comments:     5  a week average   Vaping Use   • Vaping Use: Never used   Substance and Sexual Activity   • Alcohol use:  Yes     Alcohol/week: 6 0 standard drinks     Types: 6 Cans of beer per week     Comment: socially   • Drug use: Yes     Types: Marijuana     Comment: medical marijuana   • Sexual activity: Yes     Partners: Female     Birth control/protection: None   Other Topics Concern   • None   Social History Narrative    Daily coffee consumption (2 cups/day)    reitred     Social Determinants of Health     Financial Resource Strain: Not on file   Food Insecurity: No Food Insecurity   • Worried About Running Out of Food in the Last Year: Never true   • Ran Out of Food in the Last Year: Never true   Transportation Needs: No Transportation Needs   • Lack of Transportation (Medical): No   • Lack of Transportation (Non-Medical):  No   Physical Activity: Not on file   Stress: Not on file   Social Connections: Not on file   Intimate Partner Violence: Not on file   Housing Stability: Low Risk    • Unable to Pay for Housing in the Last Year: No   • Number of Places Lived in the Last Year: 1   • Unstable Housing in the Last Year: No         Current Facility-Administered Medications:   •  acetaminophen (TYLENOL) tablet 650 mg, 650 mg, Oral, Q6H PRN, Lyn Johnson PA-C  •  albuterol (PROVENTIL HFA,VENTOLIN HFA) inhaler 2 puff, 2 puff, Inhalation, Q4H PRN, Haylee Diaz DO  •  aluminum-magnesium hydroxide-simethicone (MYLANTA) oral suspension 30 mL, 30 mL, Oral, Q6H PRN, Lyn Johnson PA-C  •  azithromycin (ZITHROMAX) 500 mg in sodium chloride 0 9 % 250 mL IVPB, 500 mg, Intravenous, Q24H, Lyn Johnson PA-C, Last Rate: 250 mL/hr at 05/01/23 0111, 500 mg at 05/01/23 0111  •  benzonatate (TESSALON PERLES) capsule 100 mg, 100 mg, Oral, TID PRN, Lyn Johnson PA-C, 100 mg at 04/30/23 2325  •  cefepime (MAXIPIME) 2 g/50 mL dextrose IVPB, 2,000 mg, Intravenous, Q12H, Vince Seaman DO  •  dabigatran etexilate (PRADAXA) capsule 150 mg, 150 mg, Oral, Q12H [address], Lyn Johnson PA-C, 150 mg at 05/01/23 0848  •  dexamethasone (DECADRON) tablet 4 mg, 4 mg, Oral, Q8H, Lyn Johnson PA-C, 4 mg at 05/01/23 1405  •  dextromethorphan-guaiFENesin (ROBITUSSIN DM) oral syrup 10 mL, 10 mL, Oral, Q4H PRN, Lyn Johnson PA-C, 10 mL at "05/01/23 1405  •  hydrochlorothiazide (HYDRODIURIL) tablet 25 mg, 25 mg, Oral, Daily, Clenton Clear, PA-C, 25 mg at 05/01/23 0846  •  memantine (NAMENDA) tablet 10 mg, 10 mg, Oral, BID, Clenton Clear, PA-C, 10 mg at 05/01/23 1518  •  metoprolol succinate (TOPROL-XL) 24 hr tablet 12 5 mg, 12 5 mg, Oral, Daily, Clenton Clear, PA-C, 12 5 mg at 05/01/23 0846  •  ondansetron (ZOFRAN) injection 4 mg, 4 mg, Intravenous, Q6H PRN, Clenton Clear, PA-C  •  pantoprazole (PROTONIX) EC tablet 40 mg, 40 mg, Oral, Early Morning, Clenton Clear, PA-C, 40 mg at 05/01/23 6510  •  polyethylene glycol (MIRALAX) packet 17 g, 17 g, Oral, Daily PRN, Clenton Clear, PA-C  •  prazosin (MINIPRESS) capsule 1 mg, 1 mg, Oral, HS, Clenton Clear, PA-C  •  vancomycin (VANCOCIN) 1500 mg in sodium chloride 0 9% 250 mL IVPB, 20 mg/kg (Adjusted), Intravenous, Once, Vince Seaman DO  •  [START ON 5/2/2023] vancomycin (VANCOCIN) IVPB (premix in dextrose) 750 mg 150 mL, 750 mg, Intravenous, Q12H, Vince Seaman DO    Medications Prior to Admission   Medication   • acetaminophen (TYLENOL) 325 mg tablet   • ALPRAZolam (XANAX) 0 5 mg tablet   • binimetinib (MEKTOVI) 15 MG tablet   • dabigatran etexilate (PRADAXA) 150 mg capsu   • dexamethasone (DECADRON) 4 mg tablet   • encorafenib (BRAFTOVI) 50 MG capsule   • hydrochlorothiazide (HYDRODIURIL) 25 mg tablet   • memantine (Namenda) 10 mg tablet   • metoprolol succinate (TOPROL-XL) 25 mg 24 hr tablet   • ondansetron (ZOFRAN) 4 mg tablet   • pantoprazole (PROTONIX) 40 mg tablet   • potassium chloride (K-DUR,KLOR-CON) 20 mEq tablet   • prazosin (MINIPRESS) 1 mg capsule   • VITAMIN D PO   • ascorbic acid (VITAMIN C) 1000 MG tablet       No Known Allergies      Physical Exam:     /75 (BP Location: Right arm)   Pulse 101   Temp 97 7 °F (36 5 °C) (Temporal)   Resp 22   Ht 5' 8\" (1 727 m)   Wt 91 7 kg (202 lb 2 6 oz)   SpO2 93%   BMI 30 74 kg/m²     Physical Exam  Constitutional:       Appearance: He is obese     HENT: " Head: Normocephalic  Mouth/Throat:      Mouth: Mucous membranes are dry  Cardiovascular:      Rate and Rhythm: Normal rate and regular rhythm  Pulmonary:      Comments: On 13 L of oxygen mid-flow   Abdominal:      General: Bowel sounds are normal       Palpations: Abdomen is soft  Skin:     General: Skin is warm and dry  Findings: Bruising present  Neurological:      General: No focal deficit present  Mental Status: He is alert  Motor: Weakness present  Psychiatric:         Behavior: Behavior normal          Thought Content:  Thought content normal            Recent Results (from the past 48 hour(s))   ECG 12 lead    Collection Time: 04/30/23  3:05 PM   Result Value Ref Range    Ventricular Rate 108 BPM    Atrial Rate 108 BPM    NJ Interval 152 ms    QRSD Interval 78 ms    QT Interval 350 ms    QTC Interval 469 ms    P Axis 45 degrees    QRS Axis 67 degrees    T Wave Axis 24 degrees   Comprehensive metabolic panel    Collection Time: 04/30/23  3:39 PM   Result Value Ref Range    Sodium 135 135 - 147 mmol/L    Potassium 3 0 (L) 3 5 - 5 3 mmol/L    Chloride 101 96 - 108 mmol/L    CO2 24 21 - 32 mmol/L    ANION GAP 10 4 - 13 mmol/L    BUN 31 (H) 5 - 25 mg/dL    Creatinine 1 25 0 60 - 1 30 mg/dL    Glucose 233 (H) 65 - 140 mg/dL    Calcium 8 6 8 4 - 10 2 mg/dL    Corrected Calcium 9 3 8 3 - 10 1 mg/dL    AST 38 13 - 39 U/L    ALT 39 7 - 52 U/L    Alkaline Phosphatase 67 34 - 104 U/L    Total Protein 5 1 (L) 6 4 - 8 4 g/dL    Albumin 3 1 (L) 3 5 - 5 0 g/dL    Total Bilirubin 0 84 0 20 - 1 00 mg/dL    eGFR 58 ml/min/1 73sq m   CBC and differential    Collection Time: 04/30/23  3:39 PM   Result Value Ref Range    WBC 17 87 (H) 4 31 - 10 16 Thousand/uL    RBC 3 31 (L) 3 88 - 5 62 Million/uL    Hemoglobin 11 9 (L) 12 0 - 17 0 g/dL    Hematocrit 35 8 (L) 36 5 - 49 3 %     (H) 82 - 98 fL    MCH 36 0 (H) 26 8 - 34 3 pg    MCHC 33 2 31 4 - 37 4 g/dL    RDW 13 9 11 6 - 15 1 %    MPV 10 0 "8 9 - 12 7 fL    Platelets 331 929 - 871 Thousands/uL   Lipase    Collection Time: 04/30/23  3:39 PM   Result Value Ref Range    Lipase 47 11 - 82 u/L   HS Troponin 0hr (reflex protocol)    Collection Time: 04/30/23  3:39 PM   Result Value Ref Range    hs TnI 0hr 7 \"Refer to ACS Flowchart\"- see link ng/L   Lactic acid, plasma (w/reflex if result > 2 0)    Collection Time: 04/30/23  3:39 PM   Result Value Ref Range    LACTIC ACID 2 4 (HH) 0 5 - 2 0 mmol/L   B-Type Natriuretic Peptide(BNP)    Collection Time: 04/30/23  3:39 PM   Result Value Ref Range    BNP 26 0 - 100 pg/mL   Manual Differential(PHLEBS Do Not Order)    Collection Time: 04/30/23  3:39 PM   Result Value Ref Range    Segmented % 86 (H) 43 - 75 %    Bands % 1 0 - 8 %    Lymphocytes % 7 (L) 14 - 44 %    Monocytes % 2 (L) 4 - 12 %    Eosinophils, % 3 0 - 6 %    Basophils % 0 0 - 1 %    Myelocytes % 1 0 - 1 %    Absolute Neutrophils 15 55 (H) 1 85 - 7 62 Thousand/uL    Lymphocytes Absolute 1 25 0 60 - 4 47 Thousand/uL    Monocytes Absolute 0 36 0 00 - 1 22 Thousand/uL    Eosinophils Absolute 0 54 (H) 0 00 - 0 40 Thousand/uL    Basophils Absolute 0 00 0 00 - 0 10 Thousand/uL    Total Counted      RBC Morphology Normal     Platelet Estimate Adequate Adequate   C-reactive protein    Collection Time: 04/30/23  3:39 PM   Result Value Ref Range     6 (H) <3 0 mg/L   Procalcitonin    Collection Time: 04/30/23  3:39 PM   Result Value Ref Range    Procalcitonin 0 28 (H) <=0 25 ng/ml   Magnesium    Collection Time: 04/30/23  3:39 PM   Result Value Ref Range    Magnesium 1 5 (L) 1 9 - 2 7 mg/dL   FLU/RSV/COVID - if FLU/RSV clinically relevant    Collection Time: 04/30/23  4:14 PM    Specimen: Nose; Nares   Result Value Ref Range    SARS-CoV-2 Negative Negative    INFLUENZA A PCR Negative Negative    INFLUENZA B PCR Negative Negative    RSV PCR Negative Negative   ECG 12 lead    Collection Time: 04/30/23  5:52 PM   Result Value Ref Range    Ventricular Rate 81 " "BPM    Atrial Rate 81 BPM    SD Interval 160 ms    QRSD Interval 74 ms    QT Interval 380 ms    QTC Interval 441 ms    P Axis 51 degrees    QRS Axis 53 degrees    T Wave [place] 14 degrees   HS Troponin I 2hr    Collection Time: 04/30/23  5:54 PM   Result Value Ref Range    hs TnI 2hr 6 \"Refer to ACS Flowchart\"- see link ng/L    Delta 2hr hsTnI -1 <20 ng/L   Lactic acid 2 Hours    Collection Time: 04/30/23  6:49 PM   Result Value Ref Range    LACTIC ACID 1 1 0 5 - 2 0 mmol/L   ECG 12 lead    Collection Time: 04/30/23  7:59 PM   Result Value Ref Range    Ventricular Rate 89 BPM    Atrial Rate 89 BPM    SD Interval 158 ms    QRSD Interval 72 ms    QT Interval 346 ms    QTC Interval 420 ms    P Axis 56 degrees    QRS Axis 52 degrees    T Wave Axis -20 degrees   HS Troponin I 4hr    Collection Time: 04/30/23  8:03 PM   Result Value Ref Range    hs TnI 4hr 8 \"Refer to ACS Flowchart\"- see link ng/L    Delta 4hr hsTnI 1 <20 ng/L   Blood culture    Collection Time: 04/30/23 11:26 PM    Specimen: Arm, Left; Blood   Result Value Ref Range    Blood Culture Received in Microbiology Lab  Culture in Progress  Blood culture    Collection Time: 04/30/23 11:26 PM    Specimen: Hand, Left; Blood   Result Value Ref Range    Blood Culture Received in Microbiology Lab  Culture in Progress  Strep Pneumoniae, Urine    Collection Time: 05/01/23 12:11 AM    Specimen: Urine, Clean Catch   Result Value Ref Range    Strep pneumoniae antigen, urine Negative Negative   Legionella antigen, Urine    Collection Time: 05/01/23 12:11 AM    Specimen: Urine, Clean Catch   Result Value Ref Range    Legionella Urinary Antigen Negative Negative   Respiratory Panel 2  1(RP2)with COVID19    Collection Time: 05/01/23 12:11 AM    Specimen: Nasopharyngeal Swab   Result Value Ref Range    Adenovirus Not Detected Not Detected    Bordetella parapertussis Not Detected Not Detected    Bordetella pertussis Not Detected Not Detected    Chlamydia pneumoniae Not " Detected Not detected    SARS-CoV-2 Not Detected Not Detected    Coronavirus 229E Not Detected Not Detected    Coronavirus HKU1 Not Detected Not Detected    Coronavirus NL63 Not Detected Not Detected    Coronavirus OC43 Not Detected Not Detected    Human Metapneumovirus Not Detected Not Detected    Rhino/Enterovirus Not Detected Not Detected    Influenza A Not Detected Not Detected    Influenza B Not Detected No Detected    Mycoplasma pneumoniae Not Detected Not Detected    Parainfluenza 1 Not Detected Not Detected    Parainfluenza 2 Not Detected Not Detected    Parainfluenza 3 Not Detected Not Detected    Parainfluenza 4 Not Detected Not Detected    Respiratory Syncytial Virus Not Detected Not Detected   Procalcitonin, Next Day AM Collection    Collection Time: 05/01/23  5:20 AM   Result Value Ref Range    Procalcitonin 0 59 (H) <=0 25 ng/ml       CT abdomen pelvis wo contrast    Result Date: 4/12/2023  Narrative: CT ABDOMEN AND PELVIS WITHOUT IV CONTRAST INDICATION:   Diarrhea Bowel obstruction suspected Diarrhea for 2 weeks with distended abdomen    COMPARISON:  4/30/2022  TECHNIQUE:  CT examination of the abdomen and pelvis was performed without intravenous contrast  Multiplanar 2D reformatted images were created from the source data  Radiation dose length product (DLP) for this visit:  647 59 mGy-cm   This examination, like all CT scans performed in the [facility], was performed utilizing techniques to minimize radiation dose exposure, including the use of iterative  reconstruction and automated exposure control  Enteric contrast was not administered  FINDINGS: ABDOMEN LOWER CHEST:  Moderate coronary artery calcifications  Patchy dependent atelectasis in the right lower lobe  LIVER/BILIARY TREE:  Mild fatty infiltration  No biliary ductal dilatation  GALLBLADDER:  Status post cholecystectomy  SPLEEN:  Status post splenectomy  There are coils seen in the region of the splenic artery   PANCREAS: Unremarkable  ADRENAL GLANDS:  Unremarkable  KIDNEYS/URETERS:  Unremarkable  No hydronephrosis  There is however a 2 0 x 1 8 cm nodule seen inferior to the right kidney in the right retroperitoneum  This was not seen on the prior CT study and therefore may reflect a metastatic deposit  STOMACH AND BOWEL:  Tiny hiatal hernia  No bowel obstruction  There is colonic diverticulosis  There is a focal area of colonic wall thickening and pericolonic inflammation at the junction of the descending colon and proximal sigmoid colon consistent with mild acute diverticulitis  APPENDIX:  A normal appendix was visualized  ABDOMINOPELVIC CAVITY:  No ascites  No pneumoperitoneum  No other lymphadenopathy  VESSELS:  The abdominal is calcified  No retroperitoneal hematoma  PELVIS REPRODUCTIVE ORGANS:  The prostate is mildly enlarged  URINARY BLADDER:  Unremarkable  ABDOMINAL WALL/INGUINAL REGIONS:  There are small fat-containing bilateral inguinal hernias right greater than left  There is a small fat-containing periumbilical hernia  OSSEOUS STRUCTURES:  No acute fracture or destructive osseous lesion  Mild compression deformities of several lower thoracic vertebral bodies appears stable  Advanced multilevel degenerative disc disease in the thoracal lumbar spine  Impression: Findings consistent with mild acute diverticulitis junction of the descending colon and proximal sigmoid colon  No bowel obstruction  Nodule in the right retroperitoneum, anterior to the right kidney, new since the prior study  This may reflect a metastatic deposit  Additional findings as above  Workstation performed: [id number]     XR chest 1 view portable    Result Date: 5/1/2023  Narrative: CHEST INDICATION:   cp/sob  COMPARISON: CT chest 4/22/2023 EXAM PERFORMED/VIEWS:  XR CHEST PORTABLE FINDINGS: Heart shadow appears unremarkable  Atherosclerotic vascular calcifications are noted   Patchy groundglass infiltrates bilaterally, worse from the prior exam  "No pneumothorax or pleural effusion  Osseous structures appear within normal limits for patient age  Impression: Patchy groundglass infiltrates bilaterally, worse from the prior exam  This could be related to atypical pneumonia versus edema  Workstation performed: [id number]     XR chest 1 view portable    Result Date: 4/22/2023  Narrative: CHEST INDICATION:   SOB  COMPARISON:  CXR 4/11/2023 and chest CT 4/18/2023  EXAM PERFORMED/VIEWS:  XR CHEST PORTABLE  FINDINGS: Cardiomediastinal silhouette normal  Lungs clear  No effusion or pneumothorax  Upper abdomen normal  Embolization coils in the left upper quadrant  Bones normal for age  Impression: No acute cardiopulmonary disease  Workstation performed: [id number]     XR chest 1 view portable    Result Date: 4/12/2023  Narrative: CHEST INDICATION:   weakness  COMPARISON:  7/30/2022 EXAM PERFORMED/VIEWS:  XR CHEST PORTABLE Single view FINDINGS: Low lung volumes accentuate markings at the bases Cardiomediastinal silhouette appears unremarkable  The lungs are clear  No pneumothorax or pleural effusion  Osseous structures appear within normal limits for patient age  Impression: Low lung volumes No acute cardiopulmonary disease  Workstation performed: [id number]     CT chest abdomen pelvis w contrast    Result Date: 4/22/2023  Narrative: CT CHEST, ABDOMEN AND PELVIS WITH IV CONTRAST INDICATION:   SOB, diarrhea/recent diverticulitis/assess for complication  \"78 yo M h/o HTN, PE/portal vein thrombosis on Pradaxa, metastatic melanoma (to brain/lung/retroperitoneum), presenting for evaluation of SOB and diarrhea  \"  \"Pt recently admitted for diarrhea/diverticulitis, discharged about 1 week ago, reports sx improved, unsure when they returned  States everything he eats causes immediate diarrhea and is covered in diarrhea on arrival  Denies dark/bloody stools  Reports he thinks he completed his oral abx  \" COMPARISON:  CTA chest PE study 4/18/2023    CT abdomen " pelvis 4/11/2023  PET/CT 3/2/2020  TECHNIQUE: CT examination of the chest, abdomen and pelvis was performed  Multiplanar 2D reformatted images were created from the source data  Radiation dose length product (DLP) for this visit:  846 1 mGy-cm   This examination, like all CT scans performed in the [facility], was performed utilizing techniques to minimize radiation dose exposure, including the use of iterative reconstruction and automated exposure control  IV Contrast:  100 mL of iohexol (OMNIPAQUE) Enteric Contrast: Enteric contrast was administered  FINDINGS: CHEST LUNGS:  Field lungs no acute findings  Mild mosaic attenuation of the pulmonary parenchyma keeping with a chronic small vessel or small airway disease  Lung nodule seen on the recent study are unchanged  No endotracheal or endobronchial lesion  PLEURA:  Unremarkable  HEART/GREAT VESSELS: Coronary artery calcifications  No thoracic aortic aneurysm  MEDIASTINUM AND BRENDA:  Unremarkable  CHEST WALL AND LOWER NECK:  Unremarkable  ABDOMEN LIVER/BILIARY TREE:  Multiple scattered enhancing lesions through the liver suspicious for metastatic disease; for example 2 4 cm segment 8 lesion #2/83 2 4 cm segment 7 lesion #2/70  GALLBLADDER:  Gallbladder is surgically absent  SPLEEN:  Status post splenectomy  Left upper quadrant vascular coils are present medial to the splenectomy bed  PANCREAS:  Unremarkable  ADRENAL GLANDS:  Unremarkable  KIDNEYS/URETERS:  Unremarkable  No hydronephrosis  STOMACH AND BOWEL:  Resolved descending colonic diverticulitis  APPENDIX:  Noninflamed  ABDOMINOPELVIC CAVITY:  No ascites  No pneumoperitoneum  Unchanged 2 1 cm right retroperitoneal nodule posterior to the left kidney, #2/138 there is also a 7 mm retroperitoneal nodule lateral to the right kidney #2/133  Unchanged right extrarenal iliac adenopathy; for example 17 mm right external iliac node #2/181 VESSELS:  Unremarkable for patient's age   PELVIS REPRODUCTIVE ORGANS:  Unremarkable for patient's age  URINARY BLADDER:  Unremarkable  ABDOMINAL WALL/INGUINAL REGIONS:  Bilateral fat-containing inguinal hernias larger on the right  Uncomplicated small fat-containing umbilical hernia  Unchanged right inguinal adenopathy  A dominant node measures 14 mm on #2/250  OSSEOUS STRUCTURES: Known osseous metastases seen on the recent PET/CT are not well visualized on this study  Impression: No acute findings in the chest, abdomen or pelvis  Resolved descending colonic diverticulitis without new bowel findings  Multiple subtle enhancing lesions throughout the liver in keeping with numerous hepatic metastases  Known pulmonary and retroperitoneal metastases  Unchanged right iliac chain and inguinal adenopathy in this patient with metastatic melanoma  Known osseous metastases are not well visualized on this study    The study was marked in [employer] for immediate notification  Workstation performed: [id number]     PE Study with CT Abdomen and Pelvis with contrast    Result Date: 4/30/2023  Narrative: CT PULMONARY ANGIOGRAM OF THE CHEST AND CT ABDOMEN AND PELVIS WITH INTRAVENOUS CONTRAST INDICATION:   hypoxia, active cancer, hx PE, abd pain and diarrhea recent diverticulitis  Metastatic melanoma with osseous metastases COMPARISON: CT 4/22/2023 and priors TECHNIQUE:  CT examination of the chest, abdomen and pelvis was performed  Thin section CT angiographic technique was used in the chest in order to evaluate for pulmonary embolus and coronal 3D MIP postprocessing was performed on the acquisition scanner  Multiplanar 2D reformatted images were created from the source data  Radiation dose length product (DLP) for this visit:  870 mGy-cm   This examination, like all CT scans performed in the [facility], was performed utilizing techniques to minimize radiation dose exposure, including the use of iterative reconstruction and automated exposure control   IV Contrast:  100 mL of iohexol (OMNIPAQUE) Enteric Contrast:  Enteric contrast was not administered  FINDINGS: CHEST PULMONARY ARTERIAL TREE:  No pulmonary embolus is seen  LUNGS: New bilateral lung groundglass opacities and infiltrates, likely inflammatory/infectious  Scattered small lung nodules again visualized, suspicious for metastases  Example 5 mm right lower lung nodule image 6/62  3 mm left upper lung nodule image 6/37  There is no tracheal or endobronchial lesion  PLEURA:  Unremarkable  HEART/AORTA: Coronary artery calcifications  No thoracic aortic aneurysm  MEDIASTINUM AND BRENDA:  Unremarkable  CHEST WALL AND LOWER NECK:  Unremarkable  ABDOMEN LIVER/BILIARY TREE: Scattered liver lesions again visualized, most concerning for metastases  Hepatic steatosis  GALLBLADDER: Gallbladder is surgically absent  SPLEEN: Splenectomy  PANCREAS:  Unremarkable  ADRENAL GLANDS:  Unremarkable  KIDNEYS/URETERS:  Unremarkable  No hydronephrosis  STOMACH AND BOWEL: There is colonic diverticulosis without evidence of acute diverticulitis  APPENDIX:  No findings to suggest appendicitis  ABDOMINOPELVIC CAVITY:  No ascites  No pneumoperitoneum  2 1 x 2 cm metastatic nodule lateral to the right psoas muscle image 2/303  This measured 1 7 x 1 5 cm on prior PET/CT  Small nodule lateral to the lower pole of the right kidney image 2/299, inferior to the right renal pole image 2/303, and lateral to the mid right kidney image 2/273  Metastatic adenopathy in the right pelvis posterior to the internal iliac vessels, as well as right inguinal region, appears stable  Small nodule anterior to the right colon measuring 1 x 1 cm, image 2/293  1 2 x 0 9 cm nodule in the right lower quadrant image 2/325  Additional scattered tiny intra-abdominal nodules are present  VESSELS:  Unremarkable for patient's age  Artifact from metallic vascular coils in the left upper quadrant  PELVIS REPRODUCTIVE ORGANS:  Unremarkable for patient's age   URINARY BLADDER:  Unremarkable  ABDOMINAL WALL/INGUINAL REGIONS: Right inguinal hernia containing fat and bowel  Small fat-containing left inguinal hernia  Fat-containing umbilical hernia  OSSEOUS STRUCTURES:  No acute fracture  Chronic loss of height in the lower thoracic vertebral bodies  Spine degenerative change  Known osseous metastases are better demonstrated on prior PET/CT  Impression: 1  No pulmonary artery emboli  2   New bilateral lung groundglass opacities and infiltrates, likely inflammatory/infectious  3  Scattered small lung nodules again visualized, suspicious for metastases  4  Scattered liver lesions again visualized, most concerning for metastases  5  Multiple intra-abdominal and right inguinal nodules, likely metastatic  6  Known osseous metastases better demonstrated on prior PET/CT  Workstation performed: [id number]     CTA chest pe study    Result Date: 4/18/2023  Narrative: CTA - CHEST WITH IV CONTRAST - PULMONARY ANGIOGRAM INDICATION:   I26 09: Other pulmonary embolism with acute cor pulmonale R06 02: Shortness of breath  COMPARISON: CTA chest PE study 7/31/2022  TECHNIQUE: CTA examination of the chest was performed using angiographic technique according to a protocol specifically tailored to evaluate for pulmonary embolism  Multiplanar 2D reformatted images were created from the source data  In addition, coronal 3D MIP postprocessing was performed on the acquisition scanner  Radiation dose length product (DLP) for this visit:  417 mGy-cm   This examination, like all CT scans performed in the [facility], was performed utilizing techniques to minimize radiation dose exposure, including the use of iterative reconstruction and automated exposure control  IV Contrast:  85 mL of iohexol (OMNIPAQUE)  FINDINGS: PULMONARY ARTERIAL TREE:  No pulmonary embolus is seen  LUNGS:  New 8 x 5 mm right middle lobe nodule #3/82  New 4 mm left upper lobe nodule #3/76    New 4 mm left upper lobe subpleural nodule #3/80  New 5 mm right lower lobe nodule #3/115  New 6 mm right lower lobe nodule #3/105  No endotracheal or endobronchial lesion  Previously described subtle scattered groundglass opacities/mosaic perfusion are slightly less apparent  These are likely to represent a chronic small vessel or small airway disease  PLEURA:  Unremarkable  HEART/GREAT VESSELS:  Unremarkable for patient's age  No thoracic aortic aneurysm  MEDIASTINUM AND BRENDA:  Unremarkable  CHEST WALL AND LOWER NECK:   Unremarkable  VISUALIZED STRUCTURES IN THE UPPER ABDOMEN:  Left upper quadrant vascular coils  Reidentified hepatic steatosis  OSSEOUS STRUCTURES:  No acute fracture or destructive osseous lesion  Impression: No pulmonary arterial embolism or pulmonary infiltrate/consolidation  New scattered pulmonary nodules suspicious for metastases  The study was marked in [facility] for immediate notification  Workstation performed: [id number]     Echo complete w/ contrast if indicated    Result Date: 4/23/2023  Narrative: •  Left Ventricle: Left ventricular cavity size is normal  Wall thickness is normal  The left ventricular ejection fraction is 65%  Systolic function is normal  Wall motion is normal  Diastolic function is normal        Labs and pertinent reports reviewed  This note has been generated by voice recognition software system  Therefore, there may be spelling, grammar, and or syntax errors  Please contact if questions arise

## 2023-05-01 NOTE — TELEPHONE ENCOUNTER
Appointment Change  Cancel, Reschedule, Change to Virtual      Who are you speaking with? wife   If it is not the patient, are they listed on an active communication consent form? yes   Which provider is the appointment scheduled with? Benito Whitfield   When is the appointment scheduled? Please list date and time 5/3   At which location is the appointment scheduled to take place? [facility]   Was the appointment rescheduled or changed from an in person visit to a virtual visit? If so, please list the details of the change  No, will call   What is the reason for the appointment change? hospitalized   Was [facility]AR transport scheduled for this visit? no   Does STAR transport need to be scheduled for the new visit (if applicable) no   Does the patient need an infusion appointment rescheduled? no   Does the patient have an infusion appointment scheduled? If so, when? no   Is the patient undergoing chemotherapy? yes   Was the no-show policy reviewed for appointments being changed with less then 24 hours of notice?  yes

## 2023-05-01 NOTE — ASSESSMENT & PLAN NOTE
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

## 2023-05-01 NOTE — PROGRESS NOTES
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  Denies history of pulmonary disease     Urine legionella/strep negative  Procal 0 28->0 59  Diffdx: interstitial pneumonia, pneumonitis, ILD, metastatic disease       Plan:  • Afebrile, chronic leukocytosis in setting of steroids, viral panel negative   • Check procal/CRP although unreliable in malignancy   • Check sputum G/S & Cx, RP2  • Pulmonary hygiene with IS, duoneb, mucinex, tessalon   • Consult: pulmonary     Watery diarrhea  Assessment & Plan  Hx of diverticulitis, C  Diff in the past  Remains with intermittent episodes of watery diarrhea but improving today  Without diverticulitis on CT scan     Plan:  • Prior C  Diff PCR positive but toxins negative, probable colonization   • Monitor with use of antibiotics       Personal history of malignant melanoma  Assessment & Plan  Known malignant melanoma, completion of whole brain radiation, briefly started on treatment with encorafenib/binimetinib  Follows with outpatient heme/onc, Dr Jimenez Gale     Pending follow-up radiation therapy     Autoimmune hemolytic anemia  Assessment & Plan  History of warm antibody hemolytic anemia, 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, monitored by outpatient Heme/onc     Plan:  • Previously treated with Rituxan  • Restarted on dexamethasone, will continue   • Trend H/H, Bili       History of pulmonary embolism  Assessment & Plan  No acute PE noted this admission, maintained on Pradaxa     * Acute respiratory failure with hypoxia ([facility] [address] )  Assessment & Plan  Presents with nonproductive cough, chest tightness, shortness of breath, generalized weakness  Recent admission for diverticulitis  • VS: afebrile, normotensive, tachycardia, tachypnea, new o2 on 3L progressed to midflow  • Labs: CBC chronic leukocytosis, BMP hypokalemia/hypomagnesemia, lactate 2 4>1 1, BNP 26, trop wnl   • Imaging: CTA C/A/P: no PE, new bilateral GGO, scattered pulmonary/liver/inguinal nodules, osseous metastasis   • Initially on 3LNC, progressed to midflow on floor, monitor for progression to HFNC/Bipap  Lungs sound clear without wheezing/crackles  Does not examine volume overloaded  5/1:  Increased O2 requirement this am, desat to 70s  Improved with 15L NC  Will transfer to step down given risk of decompensation, high likelihood need for HFNC  Plan:   • Continue supplemental O2, maintain O2 sat> 92%  Currently on 8L NC, wean as able  • ABX: Continue ceftriaxone/azithromycin day#2, narrow as appropriate   • Diet: regular  • Lines/Drains/Tubes: peripheral IV  • Pulmonology consulted, appreciate recommendations           Communication Plan  · Acute Respiratory w/ ground glass opacities  F/U sputum culture, RP2  · Pulmonology consult  · Continue Rocephin, azithromycin  · Supplemental O2          VTE Pharmacologic Prophylaxis:   Pharmacologic: Dabigatran (Pradaxa)  Mechanical VTE Prophylaxis in Place: Yes    Patient Centered Rounds: I have performed bedside rounds with nursing staff today  Discussions with Specialists or Other Care Team Provider: Case management    Education and Discussions with Family / Patient: Discussed with patient at bedside    Time Spent for Care: 45 minutes  More than 50% of total time spent on counseling and coordination of care as described above      Current Length of Stay: 1 day(s)    Current Patient Status: Inpatient   Certification Statement: The patient will continue to require additional inpatient hospital stay due to acute respiratory failure, on IV antibiotics    Discharge Plan: Discharge 72 hours, pending clinical course with IV antibiotics    Code Status: Level 1 - Full Code      Subjective:   Patient seen and examined at bedside  Patient complains of shortness of breath difficulty breathing  He is unable to take deep breaths  He denies any chest pain  He denies any sputum production or hemoptysis  He denies abdominal pain, constipation, diarrhea, leg pain, leg swelling  Objective:     Vitals:   Temp (24hrs), Av 2 °F (36 8 °C), Min:98 1 °F (36 7 °C), Max:98 4 °F (36 9 °C)    Temp:  [98 1 °F (36 7 °C)-98 4 °F (36 9 °C)] 98 4 °F (36 9 °C)  HR:  [] 92  Resp:  [18-21] 20  BP: (114-154)/(63-90) 136/68  SpO2:  [91 %-97 %] 93 %  Body mass index is 30 74 kg/m²  Input and Output Summary (last 24 hours):     No intake or output data in the 24 hours ending 23 0911    Physical Exam:     General: well appearing, no acute distress  HEENT: atraumatic, PERRLA, moist mucosa, normal pharynx, normal tonsils and adenoids, normal tongue, no fluid in sinuses  Neck: Trachea midline, no carotid bruit, no masses  Respiratory: normal chest wall expansion, CTA B, no r/r/w, no rubs  Sat 93% on 8L NC    Cardiovascular: RRR, no m/r/g, Normal S1 and S2  Abdomen: Soft, non-tender, non-distended, normal bowel sounds in all quadrants, no hepatosplenomegaly, no tympany  Rectal: deferred  Musculoskeletal: normal ROM in upper and lower extremities  Integumentary: warm, dry, and pink, with no rash, purpura, or petechia  Heme/Lymph: no lymphadenopathy, no bruises  Neurological: Cranial Nerves II-XII grossly intact, no tics, normal sensation to pressure and light touch  Psychiatric: cooperative with normal mood, affect, and cognition      Additional Data:     Labs:    Results from last 7 days   Lab Units 23  1539   WBC Thousand/uL 17 87*   HEMOGLOBIN g/dL 11 9*   HEMATOCRIT % 35 8*   PLATELETS Thousands/uL 378   BANDS PCT % 1   LYMPHO PCT % 7*   MONO PCT % 2*   EOS PCT % 3     Results from last 7 days   Lab Units 23  1539   SODIUM mmol/L 135   POTASSIUM mmol/L 3 0*   CHLORIDE mmol/L 101   CO2 mmol/L 24   BUN mg/dL 31*   CREATININE mg/dL 1 25   ANION GAP mmol/L 10   CALCIUM mg/dL 8 6   ALBUMIN g/dL 3 1*   TOTAL BILIRUBIN mg/dL 0 84   ALK PHOS U/L 67   ALT U/L 39   AST U/L 38   GLUCOSE RANDOM mg/dL 233*                 Results from last 7 days   Lab Units 05/01/23  0520 04/30/23  1849 04/30/23  1539   LACTIC ACID mmol/L  --  1 1 2 4*   PROCALCITONIN ng/ml 0 59*  --  0 28*           * I Have Reviewed All Lab Data Listed Above  * Additional Pertinent Lab Tests Reviewed: [address] Admission Reviewed    Imaging:    Imaging Reports Reviewed Today Include: CTA    Recent Cultures (last 7 days):     Results from last 7 days   Lab Units 05/01/23  0011 04/30/23  2326 04/24/23  1053   BLOOD CULTURE   --  Received in Microbiology Lab  Culture in Progress  Received in Microbiology Lab  Culture in Progress    --    LEGIONELLA URINARY ANTIGEN  Negative  --   --    C DIFF TOXIN B BY PCR   --   --  Positive*       Last 24 Hours Medication List:   Current Facility-Administered Medications   Medication Dose Route Frequency Provider Last Rate   • acetaminophen  650 mg Oral Q6H PRN Yunior Esteves PA-C     • albuterol  2 puff Inhalation Q4H PRN Blanche Olvera DO     • aluminum-magnesium hydroxide-simethicone  30 mL Oral Q6H PRN Yunior Esteves PA-C     • azithromycin  500 mg Intravenous Q24H Yunior Esteves PA-C 500 mg (05/01/23 0111)   • benzonatate  100 mg Oral TID PRN Yunior Esteves PA-C     • cefTRIAXone  1,000 mg Intravenous Q24H Yunior Esteves PA-C 1,000 mg (05/01/23 0028)   • dabigatran etexilate  150 mg Oral Q12H [address] Yunior Esteves PA-C     • dexamethasone  4 mg Oral Q8H Yunior Esteves PA-C     • dextromethorphan-guaiFENesin  10 mL Oral Q4H PRN Yunior Esteves PA-C     • hydrochlorothiazide  25 mg Oral Daily Yunior Esteves PA-C     • memantine  10 mg Oral BID Yunior Esteves PA-C     • metoprolol succinate  12 5 mg Oral Daily Yunior Esteves PA-C     • ondansetron  4 mg Intravenous Q6H PRN Kristin Klein PA-C     • pantoprazole  40 mg Oral Early Morning Kristin Klein PA-C     • polyethylene glycol  17 g Oral Daily PRN Kristni Klein PA-C     • prazosin  1 mg Oral HS Kristin Klein PA-C          Today, Patient Was Seen By: Bhumika Lyon MD    ** Please Note: Dictation voice to text software may have been used in the creation of this document   **

## 2023-05-01 NOTE — ASSESSMENT & PLAN NOTE
Presents with nonproductive cough, chest tightness, shortness of breath, generalized weakness  Recent admission for diverticulitis  • VS: afebrile, normotensive, tachycardia, tachypnea, new o2 on 3L progressed to midflow  • Labs: CBC chronic leukocytosis, BMP hypokalemia/hypomagnesemia, lactate 2 4>1 1, BNP 26, trop wnl   • Imaging: CTA C/A/P: no PE, new bilateral GGO, scattered pulmonary/liver/inguinal nodules, osseous metastasis   • Initially on 3LNC, progressed to midflow on floor, monitor for progression to HFNC/Bipap  Lungs sound clear without wheezing/crackles  Does not examine volume overloaded  5/1:  Increased O2 requirement this am, desat to 70s  Improved with 15L NC  Will transfer to step down given risk of decompensation, high likelihood need for HFNC   5/2:  Patient remains on 13L NC  Plan for bronchoscopy later today  Plan:   • Continue supplemental O2, maintain O2 sat> 92%  Currently on 13L NC, wean as able  • ABX: Day#3  Abx broadened to Cefepime, Azithyromycin, and Vancomycin on 5/1  Continue current regimen, narrow as appropriate  • Pneumocystis, histoplasma, aspergillus, fungitell assay pending   F/u results  • Diet: regular  • Lines/Drains/Tubes: peripheral IV  • Pulmonology consulted, appreciate recommendations

## 2023-05-01 NOTE — H&P
[facility]  H&P  Name: Brenda Long 70 y o  male I MRN: [id number]  Unit/Bed#: E5 -01 I Date of Admission: 4/30/2023   Date of Service: 5/1/2023 I Hospital Day: 1      Assessment/Plan   * Acute respiratory failure with hypoxia [facility])  Assessment & Plan  Presents with nonproductive cough, chest tightness, shortness of breath, generalized weakness  Recent admission for diverticulitis  • VS: afebrile, normotensive, tachycardia, tachypnea, new o2 on 3L progressed to midflow  • Labs: CBC chronic leukocytosis, BMP hypokalemia/hypomagnesemia, lactate 2 4>1 1, BNP 26, trop wnl   • Imaging: CTA C/A/P: no PE, new bilateral GGO, scattered pulmonary/liver/inguinal nodules, osseous metastasis     Plan:   • Admit to medsur   • Initially on 3LNC, progressed to midflow on floor, monitor for progression to HFNC/Bipap  Lungs sound clear without wheezing/crackles  Does not examine volume overloaded  • ABX: ceftriaxone/azithromycin, narrow as appropriate   • Wean oxygen as able   • Diet: regular  • Lines/Drains/Tubes: peripheral IV  • Consults: pulmonary    Ground glass opacity present on imaging of lung  Assessment & Plan  New bilateral lung ground glass opacities/infiltrates  Past smoking history  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, 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  Denies history of pulmonary disease   Diffdx: interstitial pneumonia, pneumonitis, ILD, metastatic disease     Plan:  • Afebrile, chronic leukocytosis in setting of steroids, viral panel negative   • Check procal/CRP although unreliable in malignancy   • Check sputum G/S & Cx, urine strep/legionella, RP2  • Pulmonary hygiene with IS, duoneb, mucinex, tessalon   • Consult: pulmonary       Watery diarrhea  Assessment & Plan  Hx of diverticulitis, C  Diff in the past  Remains with intermittent episodes of watery diarrhea but improving today  Without diverticulitis on CT scan     Plan:  • Prior C   Diff PCR positive but toxins negative, probable colonization   • Monitor with use of antibiotics       Personal history of malignant melanoma  Assessment & Plan  Known malignant melanoma, completion of whole brain radiation, briefly started on treatment with encorafenib/binimetinib  Follows with outpatient heme/onc, Dr Alee Castañeda  Pending follow-up radiation therapy     Autoimmune hemolytic anemia  Assessment & Plan  History of warm antibody hemolytic anemia, 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, monitored by outpatient Heme/onc     Plan:  • Previously treated with Rituxan  • Restarted on dexamethasone, will continue   • Trend H/H, Bili       History of pulmonary embolism  Assessment & Plan  No acute PE noted this admission, maintained on Pradaxa     VTE Pharmacologic Prophylaxis: VTE Score: 5 High Risk (Score >/= 5) - Pharmacological DVT Prophylaxis Ordered: dabigatran (Pradaxa)  Sequential Compression Devices Ordered  Code Status: Level 1 - Full Code   Discussion with family: Update in AM    Anticipated Length of Stay: Patient will be admitted on an inpatient basis with an anticipated length of stay of greater than 2 midnights secondary to hypoxia  Total Time Spent on Date of Encounter in care of patient: 75 minutes This time was spent on one or more of the following: performing physical exam; counseling and coordination of care; obtaining or reviewing history; documenting in the medical record; reviewing/ordering tests, medications or procedures; communicating with other healthcare professionals and discussing with patient's family/caregivers  Chief Complaint: weakness    History of Present Illness:  Alexsander Hairston is a 70 y o  male with a PMH of metastatic melanoma with brain mets, diverticulosis, autoimmune hemolytic anemia who presents with weakness, shortness of breath  Recently admitted on 04/11 for diverticulitis, then 04/22 again for diarrhea/ARABELLA then discharged on 04/25  His diarrhea was improving but returned   He has been tolerating oral intake and denies any abdominal pain on admission  Denies fevers/chills  Additionally he developed a persistent cough and has become much more short of breath on minimal exertion, worsening since last discharge  He has had no recent travel  Has a non-productive persistent cough with his shortness of breath  Nothing seemed to exacerbate or relieve his symptoms  No recent radiation treatments or chemotherapy  Review of Systems:  Review of Systems   Constitutional: Positive for fatigue  Negative for chills and fever  Respiratory: Positive for cough, chest tightness and shortness of breath  Negative for wheezing and stridor  Cardiovascular: Negative for chest pain, palpitations and leg swelling  Gastrointestinal: Positive for diarrhea  Negative for abdominal pain, nausea and vomiting  Neurological: Negative for syncope  All other systems reviewed and are negative        Past Medical and Surgical History:   Past Medical History:   Diagnosis Date   • Acute diverticulitis 4/12/2023   • ARABELLA (acute kidney injury) ([facility] [address] ) 3/29/2018   • Anemia 11/02/2016   • Anxiety    • Arthritis    • [facility]    • Claustrophobia    • COVID 12/2020   • DVT (deep venous thrombosis) (HCC)    • GERD (gastroesophageal reflux disease)    • Hearing loss, right    • Hemolytic anemia (HCC)    • History of transfusion     2018 - no adverse reaction   • Hypertension    • Malignant melanoma of leg, right (HCC) 07/01/2022   • Palpitation    • Portal vein thrombosis    • PTSD (post-traumatic stress disorder)    • Pulmonary emboli (HCC)    • Squamous cell skin cancer 12/20/2022    SCCIS- 12/6/22   • Tobacco abuse        Past Surgical History:   Procedure Laterality Date   • CATARACT EXTRACTION Bilateral    • CHOLECYSTECTOMY  07/18/2017   • COLONOSCOPY     • ELBOW ARTHROPLASTY Left     bursectomy   • IR BIOPSY RETROPERITONEUM  3/17/2023   • IR DRAINAGE TUBE CHECK WITH SCLEROSIS  10/06/2022   • IR DRAINAGE TUBE CHECK WITH SCLEROSIS  10/10/2022 • IR DRAINAGE TUBE CHECK WITH SCLEROSIS  10/20/2022   • IR DRAINAGE TUBE CHECK WITH SCLEROSIS  10/27/2022   • IR DRAINAGE TUBE CHECK WITH SCLEROSIS  11/04/2022   • IR DRAINAGE TUBE CHECK WITH SCLEROSIS  11/15/2022   • IR DRAINAGE TUBE CHECK WITH SCLEROSIS  11/25/2022   • IR DRAINAGE TUBE PLACEMENT  09/19/2022   • JOINT REPLACEMENT Right 02/02/2021    knee   • KNEE SURGERY Right     meniscus tear   • LYMPH NODE BIOPSY Right 07/29/2022    Procedure: BIOPSY LYMPH NODE SENTINEL;  Surgeon: Marie Yancey MD;  [facility]: [facility];  Service: Surgical Oncology   • MOHS SURGERY Right 12/20/2022    Right dorsal hand SCCIS-Dr Isabel   • PA ARTHRP KNE CONDYLE&PLATU MEDIAL&LAT COMPARTMENTS Right 02/02/2021    Procedure: ARTHROPLASTY KNEE TOTAL;  Surgeon: Uriel Martines MD;  Location[facility];  Service: Orthopedics   • PA ARTHRP KNE CONDYLE&PLATU MEDIAL&LAT COMPARTMENTS Left 11/17/2021    Procedure: TOTAL KNEE REPLACEMENT;  Surgeon: Uriel Martines MD;  [facility];  Service: Orthopedics   • PA LAPS SURG CHOLECYSTECTOMY Queta Dus N/A 12/23/2017    Procedure: CHOLECYSTECTOMY LAPAROSCOPIC with cholangiogram;  Surgeon: Vaibhav Turk MD;  Location[facility];  Service: General   • PA SPLENECTOMY TOTAL SEPARATE PROCEDURE N/A 05/18/2017    Procedure: LAPAROSCOPIC HAND ASSIST SPLENECTOMY;  Surgeon: Alka Manley MD;  [facility];  Service: Surgical Oncology   • SHOULDER SURGERY Left     rotator cuff x4, reconstruction   • SKIN BIOPSY  5 May 2022   • SKIN CANCER EXCISION  29 July 2022   • SKIN LESION EXCISION Right 07/29/2022    Procedure: WIDE EXCISION RIGHT MEDIAL THIGH;  Surgeon: Marie Yancey MD;  [facility]: [facility];  Service: Surgical Oncology       Meds/Allergies:  Prior to Admission medications    Medication Sig Start Date End Date Taking?  Authorizing Provider   acetaminophen (TYLENOL) 325 mg tablet Take 2 tablets (650 mg total) by mouth every 6 (six) hours as needed for mild pain 2/3/21 Yes Tan Hodge PA-C   ALPRAZolam Hiwot Pleasure) 0 5 mg tablet Take 1 tablet (0 5 mg total) by mouth 2 (two) times a day Take one tablet one hour before scan and bring the second one with you to take right before the MRI if needed 3/3/23  Yes Edwin Peters MD   dabigatran etexilate (PRADAXA) 150 mg capsu Take 1 capsule (150 mg total) by mouth every 12 (twelve) hours 8/4/22  Yes Esdras Hdz MD   dexamethasone (DECADRON) 4 mg tablet Take 1 tablet (4 mg total) by mouth every 8 (eight) hours Please make sure you're on Protonix 40 mg daily for GI prophylaxis 4/17/23  Yes Edwin Peters MD   hydrochlorothiazide (HYDRODIURIL) 25 mg tablet Take 1 tablet (25 mg total) by mouth daily 2/15/21  Yes Belinda Agudelo DO   memantine (Namenda) 10 mg tablet Take 1 tablet (10 mg total) by mouth 2 (two) times a day 4/18/23  Yes Will Paul MD   metoprolol succinate (TOPROL-XL) 25 mg 24 hr tablet Take 0 5 tablets (12 5 mg total) by mouth daily 1/4/21  Yes Belinda Agudelo DO   [facility]ron [facility]) 4 mg tablet Take 1 tablet (4 mg total) by mouth every 8 (eight) hours as needed for nausea or vomiting 11/17/22  Yes Edwin Peters MD   pantoprazole (PROTONIX) 40 mg tablet Take 1 tablet (40 mg total) by mouth daily 3/4/23  Yes Valerio Dorsey, DO   potassium chloride (K-DUR,KLOR-CON) 20 mEq tablet Take 1 tablet (20 mEq total) by mouth daily 12/29/20  Yes Belinda Agudelo DO   prazosin (MINIPRESS) 1 mg capsule Take 1 mg by mouth daily at bedtime  12/8/20  Yes Historical Provider, MD   VITAMIN D PO Take 1,000 Units by mouth daily    Yes Historical Provider, MD   ascorbic acid (VITAMIN C) 1000 MG tablet Take 1 tablet (1,000 mg total) by mouth every 12 (twelve) hours for 6 doses  Patient taking differently: Take 1,000 mg by mouth daily Every other day 12/22/20 12/6/22  Pedro Han MD     I have reviewed home medications using recent Epic encounter      Allergies: No Known Allergies    Social History:  Marital "Status: /Civil Union   Occupation:   Patient Pre-hospital Living Situation: Home  Patient Pre-hospital Level of Mobility: walks  Patient Pre-hospital Diet Restrictions: none  Substance Use History:   Social History     Substance and Sexual Activity   Alcohol Use Yes   • Alcohol/week: 6 0 standard drinks   • Types: 6 Cans of beer per week    Comment: socially     Social History     Tobacco Use   Smoking Status Some Days   • Packs/day: 0 00   • Years: 5 00   • Pack years: 0 00   • Types: Cigars, Cigarettes   Smokeless Tobacco Current   • Types: Snuff   Tobacco Comments    5  a week average     Social History     Substance and Sexual Activity   Drug Use Yes   • Types: Marijuana    Comment: medical marijuana       Family History:  Family History   Problem Relation Age of Onset   • Cancer Mother    • Breast cancer Mother    • Other Father         CABG   • Heart attack Paternal Grandfather         acute MI       Physical Exam:     Vitals:   Blood Pressure: 154/88 (04/30/23 2220)  Pulse: 100 (04/30/23 2220)  Temperature: 98 1 °F (36 7 °C) (04/30/23 2220)  Temp Source: Oral (04/30/23 1454)  Respirations: 20 (04/30/23 2220)  Height: 5' 8\" (172 7 cm) (04/30/23 2003)  Weight - Scale: 91 7 kg (202 lb 2 6 oz) (04/30/23 2003)  SpO2: 94 % (04/30/23 2220)    Physical Exam  Vitals and nursing note reviewed  Constitutional:       General: He is not in acute distress  Appearance: He is well-developed  HENT:      Head: Normocephalic and atraumatic  Eyes:      Conjunctiva/sclera: Conjunctivae normal    Cardiovascular:      Rate and Rhythm: Regular rhythm  Tachycardia present  Heart sounds: No murmur heard  Pulmonary:      Effort: Tachypnea present  Breath sounds: Normal breath sounds  No stridor or decreased air movement  No wheezing  Abdominal:      Palpations: Abdomen is soft  Tenderness: There is no abdominal tenderness  Musculoskeletal:         General: No swelling        Cervical back: Neck " supple  Skin:     General: Skin is warm and dry  Capillary Refill: Capillary refill takes less than 2 seconds  Neurological:      Mental Status: He is alert  Psychiatric:         Mood and Affect: Mood normal           Additional Data:     Lab Results:  Results from last 7 days   Lab Units 04/30/23  1539 04/24/23  0508   WBC Thousand/uL 17 87* 18 17*   HEMOGLOBIN g/dL 11 9* 10 5*   HEMATOCRIT % 35 8* 32 6*   PLATELETS Thousands/uL 378 260   BANDS PCT % 1  --    NEUTROS PCT %  --  88*   LYMPHS PCT %  --  6*   LYMPHO PCT % 7*  --    MONOS PCT %  --  4   MONO PCT % 2*  --    EOS PCT % 3 0     Results from last 7 days   Lab Units 04/30/23  1539   SODIUM mmol/L 135   POTASSIUM mmol/L 3 0*   CHLORIDE mmol/L 101   CO2 mmol/L 24   BUN mg/dL 31*   CREATININE mg/dL 1 25   ANION GAP mmol/L 10   CALCIUM mg/dL 8 6   ALBUMIN g/dL 3 1*   TOTAL BILIRUBIN mg/dL 0 84   ALK PHOS U/L 67   ALT U/L 39   AST U/L 38   GLUCOSE RANDOM mg/dL 233*                 Results from last 7 days   Lab Units 04/30/23  1849 04/30/23  1539 04/24/23  0508   LACTIC ACID mmol/L 1 1 2 4*  --    PROCALCITONIN ng/ml  --   --  4 01*       Lines/Drains:  Invasive Devices     Peripheral Intravenous Line  Duration           Peripheral IV 04/30/23 Left Antecubital <1 day                    Imaging: Reviewed radiology reports from this admission including: chest CT scan and abdominal/pelvic CT and Personally reviewed the following imaging: chest CT scan and abdominal/pelvic CT  PE Study with CT Abdomen and Pelvis with contrast   Final Result by Hira Reyna MD (04/30 2027)   1  No pulmonary artery emboli  2   New bilateral lung groundglass opacities and infiltrates, likely inflammatory/infectious  3  Scattered small lung nodules again visualized, suspicious for metastases  4  Scattered liver lesions again visualized, most concerning for metastases  5  Multiple intra-abdominal and right inguinal nodules, likely metastatic     6  Known osseous metastases better demonstrated on prior PET/CT  Workstation performed: [id number]         XR chest 1 view portable    (Results Pending)       EKG and Other Studies Reviewed on Admission:   · EKG: NSR  HR 81     ** Please Note: This note has been constructed using a voice recognition system   **

## 2023-05-01 NOTE — NURSING NOTE
Pt desat to 76% with activity on 10L midflow nasal cannula  Pt placed on 15L midflow nasal cannula for 20 minutes before returneing to SaO2 92%  Transferred to step down level for hypoxia

## 2023-05-01 NOTE — ASSESSMENT & PLAN NOTE
Known malignant melanoma, completion of whole brain radiation, briefly started on treatment with encorafenib/binimetinib  Follows with outpatient heme/onc, Dr Pamela Brown     Pending follow-up radiation therapy

## 2023-05-01 NOTE — ED NOTES
"Pt rang call bell and states he cannot take a deep breath through his nose without coughing  Pt questions why \"his cough is not being addressed\"  RN informed pt she would reach out to provider with concerns  Pt SpO2 noted to be 91% on 3lpm O2 via nasal cannula  Pt denies active shortness of breath at this time  Provider notified       [id number], RN  04/30/23 2130    "

## 2023-05-01 NOTE — ASSESSMENT & PLAN NOTE
Known malignant melanoma, completion of whole brain radiation, briefly started on treatment with encorafenib/binimetinib  Follows with outpatient heme/onc, Dr Angel Presley     Pending follow-up radiation therapy

## 2023-05-01 NOTE — PLAN OF CARE
Problem: Potential for Falls  Goal: Patient will remain free of falls  Description: INTERVENTIONS:  - Educate patient/family on patient safety including physical limitations  - Instruct patient to call for assistance with activity   - Consult OT/PT to assist with strengthening/mobility   - Keep Call bell within reach  - Keep bed low and locked with side rails adjusted as appropriate  - Keep care items and personal belongings within reach  - Initiate and maintain comfort rounds  - Make Fall Risk Sign visible to staff  - Offer Toileting every 2 Hours, in advance of need  - Initiate/Maintain bed alarm  - Obtain necessary fall risk management equipment: bed alarm  - Apply yellow socks and bracelet for high fall risk patients  - Consider moving patient to room near nurses station  Outcome: Progressing     Problem: MOBILITY - ADULT  Goal: Maintain or return to baseline ADL function  Description: INTERVENTIONS:  -  Assess patient's ability to carry out ADLs; assess patient's baseline for ADL function and identify physical deficits which impact ability to perform ADLs (bathing, care of mouth/teeth, toileting, grooming, dressing, etc )  - Assess/evaluate cause of self-care deficits   - Assess range of motion  - Assess patient's mobility; develop plan if impaired  - Assess patient's need for assistive devices and provide as appropriate  - Encourage maximum independence but intervene and supervise when necessary  - Involve family in performance of ADLs  - Assess for home care needs following discharge   - Consider OT consult to assist with ADL evaluation and planning for discharge  - Provide patient education as appropriate  Outcome: Progressing  Goal: Maintains/Returns to pre admission functional level  Description: INTERVENTIONS:  - Perform BMAT or MOVE assessment daily    - Set and communicate daily mobility goal to care team and patient/family/caregiver     - Collaborate with rehabilitation services on mobility goals if consulted  - Perform Range of Motion 3 times a day  - Reposition patient every 2 hours    - Dangle patient 3 times a day  - Stand patient 3 times a day  - Ambulate patient 3 times a day  - Out of bed to chair 3 times a day   - Out of bed for meals 3 times a day  - Out of bed for toileting  - Record patient progress and toleration of activity level   Outcome: Progressing     Problem: INFECTION - ADULT  Goal: Absence or prevention of progression during hospitalization  Description: INTERVENTIONS:  - Assess and monitor for signs and symptoms of infection  - Monitor lab/diagnostic results  - Monitor all insertion sites, i e  indwelling lines, tubes, and drains  - Monitor endotracheal if appropriate and nasal secretions for changes in amount and color  - [place] appropriate cooling/warming therapies per order  - Administer medications as ordered  - Instruct and encourage patient and family to use good hand hygiene technique  - Identify and instruct in appropriate isolation precautions for identified infection/condition  Outcome: Progressing  Goal: Absence of fever/infection during neutropenic period  Description: INTERVENTIONS:  - Monitor WBC    Outcome: Progressing     Problem: SAFETY ADULT  Goal: Patient will remain free of falls  Description: INTERVENTIONS:  - Educate patient/family on patient safety including physical limitations  - Instruct patient to call for assistance with activity   - Consult OT/PT to assist with strengthening/mobility   - Keep Call bell within reach  - Keep bed low and locked with side rails adjusted as appropriate  - Keep care items and personal belongings within reach  - Initiate and maintain comfort rounds  - Make Fall Risk Sign visible to staff  - Offer Toileting every 2 Hours, in advance of need  - Initiate/Maintain bed alarm  - Obtain necessary fall risk management equipment: bed alarm  - Apply yellow socks and bracelet for high fall risk patients  - Consider moving patient to room near nurses station  Outcome: Progressing  Goal: Maintain or return to baseline ADL function  Description: INTERVENTIONS:  -  Assess patient's ability to carry out ADLs; assess patient's baseline for ADL function and identify physical deficits which impact ability to perform ADLs (bathing, care of mouth/teeth, toileting, grooming, dressing, etc )  - Assess/evaluate cause of self-care deficits   - Assess range of motion  - Assess patient's mobility; develop plan if impaired  - Assess patient's need for assistive devices and provide as appropriate  - Encourage maximum independence but intervene and supervise when necessary  - Involve family in performance of ADLs  - Assess for home care needs following discharge   - Consider OT consult to assist with ADL evaluation and planning for discharge  - Provide patient education as appropriate  Outcome: Progressing  Goal: Maintains/Returns to pre admission functional level  Description: INTERVENTIONS:  - Perform BMAT or MOVE assessment daily    - Set and communicate daily mobility goal to care team and patient/family/caregiver  - Collaborate with rehabilitation services on mobility goals if consulted  - Perform Range of Motion 3 times a day  - Reposition patient every 2 hours    - Dangle patient 3 times a day  - Stand patient 3 times a day  - Ambulate patient 3 times a day  - Out of bed to chair 3 times a day   - Out of bed for meals 3 times a day  - Out of bed for toileting  - Record patient progress and toleration of activity level   Outcome: Progressing     Problem: CARDIOVASCULAR - ADULT  Goal: Maintains optimal cardiac output and hemodynamic stability  Description: INTERVENTIONS:  - Monitor I/O, vital signs and rhythm  - Monitor for S/S and trends of decreased cardiac output  - Administer and titrate ordered vasoactive medications to optimize hemodynamic stability  - Assess quality of pulses, skin color and temperature  - Assess for signs of decreased coronary artery perfusion  - Instruct patient to report change in severity of symptoms  Outcome: Progressing  Goal: Absence of cardiac dysrhythmias or at baseline rhythm  Description: INTERVENTIONS:  - Continuous cardiac monitoring, vital signs, obtain 12 lead EKG if ordered  - Administer antiarrhythmic and heart rate control medications as ordered  - Monitor electrolytes and administer replacement therapy as ordered  Outcome: Progressing     Problem: RESPIRATORY - ADULT  Goal: Achieves optimal ventilation and oxygenation  Description: INTERVENTIONS:  - Assess for changes in respiratory status  - Assess for changes in mentation and behavior  - Position to facilitate oxygenation and minimize respiratory effort  - Oxygen administered by appropriate delivery if ordered  - Initiate smoking cessation education as indicated  - Encourage broncho-pulmonary hygiene including cough, deep breathe, Incentive Spirometry  - Assess the need for suctioning and aspirate as needed  - Assess and instruct to report SOB or any respiratory difficulty  - Respiratory Therapy support as indicated  Outcome: Progressing     Problem: GASTROINTESTINAL - ADULT  Goal: Minimal or absence of nausea and/or vomiting  Description: INTERVENTIONS:  - Administer IV fluids if ordered to ensure adequate hydration  - Maintain NPO status until nausea and vomiting are resolved  - Nasogastric tube if ordered  - Administer ordered antiemetic medications as needed  - Provide nonpharmacologic comfort measures as appropriate  - Advance diet as tolerated, if ordered  - Consider nutrition services referral to assist patient with adequate nutrition and appropriate food choices  Outcome: Progressing  Goal: Maintains or returns to baseline bowel function  Description: INTERVENTIONS:  - Assess bowel function  - Encourage oral fluids to ensure adequate hydration  - Administer IV fluids if ordered to ensure adequate hydration  - Administer ordered medications as needed  - Encourage mobilization and activity  - Consider nutritional services referral to assist patient with adequate nutrition and appropriate food choices  Outcome: Progressing  Goal: Maintains adequate nutritional intake  Description: INTERVENTIONS:  - Monitor percentage of each meal consumed  - Identify factors contributing to decreased intake, treat as appropriate  - Assist with meals as needed  - Monitor I&O, weight, and lab values if indicated  - Obtain nutrition services referral as needed  Outcome: Progressing  Goal: Establish and maintain optimal ostomy function  Description: INTERVENTIONS:  - Assess bowel function  - Encourage oral fluids to ensure adequate hydration  - Administer IV fluids if ordered to ensure adequate hydration   - Administer ordered medications as needed  - Encourage mobilization and activity  - Nutrition services referral to assist patient with appropriate food choices  - Assess stoma site  - Consider wound care consult   Outcome: Progressing  Goal: Oral mucous membranes remain intact  Description: INTERVENTIONS  - Assess oral mucosa and hygiene practices  - Implement preventative oral hygiene regimen  - Implement oral medicated treatments as ordered  - Initiate Nutrition services referral as needed  Outcome: Progressing     Problem: METABOLIC, FLUID AND ELECTROLYTES - ADULT  Goal: Electrolytes maintained within normal limits  Description: INTERVENTIONS:  - Monitor labs and assess patient for signs and symptoms of electrolyte imbalances  - Administer electrolyte replacement as ordered  - Monitor response to electrolyte replacements, including repeat lab results as appropriate  - Instruct patient on fluid and nutrition as appropriate  Outcome: Progressing  Goal: Fluid balance maintained  Description: INTERVENTIONS:  - Monitor labs   - Monitor I/O and WT  - Instruct patient on fluid and nutrition as appropriate  - Assess for signs & symptoms of volume excess or deficit  Outcome: Progressing  Goal: Glucose maintained within target range  Description: INTERVENTIONS:  - Monitor Blood Glucose as ordered  - Assess for signs and symptoms of hyperglycemia and hypoglycemia  - Administer ordered medications to maintain glucose within target range  - Assess nutritional intake and initiate nutrition service referral as needed  Outcome: Progressing

## 2023-05-01 NOTE — PHYSICAL THERAPY NOTE
PHYSICAL THERAPY NOTE          Patient Name: Hector Glaser  ENEML'K Date: 5/1/2023 05/01/23 3324   PT Last Visit   PT Visit Date 05/01/23   Note Type   Note type Cancelled Session   Cancel Reasons Refusal   Additional Comments PT consulte received  Chart reviewed  Pt declining therapy at this time 2* fatigue and SOB  Request for therapy to return at later time to complete evaluation  Will follow and complete eval as appropriate       Ramon Palacios, PT

## 2023-05-01 NOTE — ASSESSMENT & PLAN NOTE
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

## 2023-05-01 NOTE — CONSULTS
Pulmonary Medicine-Consultation  Cheryl Douglas 70 y o  male MRN: [id number]  Unit/Bed#: E4 -01 Encounter: [id number]      Assessment:  1  Acute hypoxic respiratory failure  2  Abnormal CT chest-bilateral GGO's/infiltrates-see below  3  Multiple pulmonary nodules  4  Hx metastatic melanoma to the brain  5  Tobacco abuse  6  Class I obesity    Recommendations/discussion:  • Bilateral GGO's new from last imaging on 4/22  • DDx in acute settings: Infection/atypical/opportunistic/PCP related given the active chemotherapy/steroids for melanoma vs inflammatory process/eosinophilic given the peripheral eosinophilia however no exposure vs chemotherapy/related pneumonitis, other considerations pulmonary edema however recent TTE was normal  • So far negative urine antigen for Legionella/strep pneumonia, negative RP 2  • Already on steroids dexamethasone 4 mg every 8, will continue this dose for now  • Consider discussion with oncology about holding the chemotherapy for now  • Given the recent hospitalization: Would expand antibiotics to azithromycin/cefepime/vancomycin, recheck procalcitonin  • Encouraged to give the sputum sample/added PCP testing  • Plan for bronchoscopy/BAL tomorrow, n p o  after midnight    Physician Requesting Consult: Pooja Coates DO    Reason for Consult / Principal Problem: Hypoxic respiratory failure/abnormal CT chest    HPI:   70 y o  male with a h/o anxiety/depression, acid reflux, DVT/PE, class I obesity, HTN, metastatic melanoma/to the brain on binimetinib/encorafenib, portal vein thrombosis, tobacco abuse, autoimmune hemolytic anemia    Recently hospitalized twice for diverticulitis and for diarrhea/ARABELLA found to have positive C  difficile  Presented to the ED 4/30 with weakness and shortness of breath  Also developed persistent cough, worsening dyspnea to be at minimal exertion  Noted to require supplemental oxygen incrementally from 3 LPM yesterday to 13 LPM/mid flow today  Noted leukocytosis, neutrophilia with elevated eosinophils today at 540 cells  Negative viral panel including RP 2  Elevated procalcitonin 0 28>> 0 59  CT chest showed new bilateral GGO's/infiltrates, scattered small lung nodules suspicious for metastasis 5 mm at largest size  No pulmonary embolism  Admitted to [facility], started on azithromycin/ceftriaxone, continued on home Decadron 4 mg every 8  On my assessment, patient states that he is feeling slightly better/easier to breathe  Still with cough/intermittently productive did not give a sputum sample yet  Though symptoms started around 1 week ago, no exposure to a sick contact  States that last smoking was more than 2 months ago, few cigarettes      Inpatient consult to Pulmonology  Consult performed by: Von Zaldivar MD  Consult ordered by: Redd Charlton PA-C          Review of Systems  + Fatigue, + weakness, +HA, + cough, + abdominal pain/mild tenderness as per hpi, all other systems reviewed and were negative      Studies:    Imaging Studies: I have personally reviewed pertinent films in PACS    EKG, Pathology, and Other Studies: I have personally reviewed pertinent films in PACS    Pulmonary Results (PFTs, PSG): None in file    Historical Information   Past Medical History:   Diagnosis Date   • Acute diverticulitis 4/12/2023   • ARABELLA (acute kidney injury) ([facility] [address] ) 3/29/2018   • Anemia 11/02/2016   • Anxiety    • Arthritis    • Autoimmune hemolytic anemia ([address] )    • Claustrophobia    • COVID 12/2020   • DVT (deep venous thrombosis) (HCC)    • GERD (gastroesophageal reflux disease)    • Hearing loss, right    • Hemolytic anemia ([address] )    • History of transfusion     2018 - no adverse reaction   • Hypertension    • Malignant melanoma of leg, right ([address] ) 07/01/2022   • Palpitation    • Portal vein thrombosis    • PTSD (post-traumatic stress disorder)    • Pulmonary emboli ([address] )    • Squamous cell skin cancer 12/20/2022    SCCIS- 12/6/22   • Tobacco abuse      Past Surgical History:   Procedure Laterality Date   • CATARACT EXTRACTION Bilateral    • CHOLECYSTECTOMY  07/18/2017   • COLONOSCOPY     • ELBOW ARTHROPLASTY Left     bursectomy   • IR BIOPSY RETROPERITONEUM  3/17/2023   • IR DRAINAGE TUBE CHECK WITH SCLEROSIS  10/06/2022   • IR DRAINAGE TUBE CHECK WITH SCLEROSIS  10/10/2022   • IR DRAINAGE TUBE CHECK WITH SCLEROSIS  10/20/2022   • IR DRAINAGE TUBE CHECK WITH SCLEROSIS  10/27/2022   • IR DRAINAGE TUBE CHECK WITH SCLEROSIS  11/04/2022   • IR DRAINAGE TUBE CHECK WITH SCLEROSIS  11/15/2022   • IR DRAINAGE TUBE CHECK WITH SCLEROSIS  11/25/2022   • IR DRAINAGE TUBE PLACEMENT  09/19/2022   • JOINT REPLACEMENT Right 02/02/2021    knee   • KNEE SURGERY Right     meniscus tear   • LYMPH NODE BIOPSY Right 07/29/2022    Procedure: BIOPSY LYMPH NODE SENTINEL;  Surgeon: Darian Naylor MD;  [facility]: [facility];  Service: Surgical Oncology   • MOHS SURGERY Right 12/20/2022    Right dorsal hand SCCIS-Dr Isabel   • ID ARTHRP KNE CONDYLE&PLATU MEDIAL&LAT COMPARTMENTS Right 02/02/2021    Procedure: ARTHROPLASTY KNEE TOTAL;  Surgeon: Julissa Powers MD;  Location[facility];  Service: Orthopedics   • ID ARTHRP KNE CONDYLE&PLATU MEDIAL&LAT COMPARTMENTS Left 11/17/2021    Procedure: TOTAL KNEE REPLACEMENT;  Surgeon: Julissa Powers MD;  [facility];  Service: Orthopedics   • ID LAPS SURG CHOLECYSTECTOMY Griselda Corpus N/A 12/23/2017    Procedure: CHOLECYSTECTOMY LAPAROSCOPIC with cholangiogram;  Surgeon: Mahsa Moran MD;  [facility];  Service: General   • ID SPLENECTOMY TOTAL SEPARATE PROCEDURE N/A 05/18/2017    Procedure: LAPAROSCOPIC HAND ASSIST SPLENECTOMY;  Surgeon: Florentino Saenz MD;  [facility]: [facility];  Service: Surgical Oncology   • SHOULDER SURGERY Left     rotator cuff x4, reconstruction   • SKIN BIOPSY  5 May 2022   • SKIN CANCER EXCISION  29 July 2022   • SKIN LESION EXCISION Right 07/29/2022    Procedure: WIDE EXCISION RIGHT "MEDIAL THIGH;  Surgeon: Braulio Keys MD;  [facility]: [facility];  Service: Surgical Oncology     Social History   Social History     Substance and Sexual Activity   Alcohol Use Yes   • Alcohol/week: 6 0 standard drinks   • Types: 6 Cans of beer per week    Comment: socially     Social History     Substance and Sexual Activity   Drug Use Yes   • Types: Marijuana    Comment: medical marijuana     Social History     Tobacco Use   Smoking Status Some Days   • Packs/day: 0 00   • Years: 5 00   • Pack years: 0 00   • Types: Cigars, Cigarettes   Smokeless Tobacco Current   • Types: Snuff   Tobacco Comments    5  a week average     E-Cigarette/Vaping   • E-Cigarette Use Never User      E-Cigarette/Vaping Substances   • Nicotine No    • THC No    • CBD No    • Flavoring No    • Other No    • Unknown No          Family History:   Family History   Problem Relation Age of Onset   • Cancer Mother    • Breast cancer Mother    • Other Father         CABG   • Heart attack Paternal Grandfather         acute MI       Meds/Allergies   all current active meds have been reviewed    No Known Allergies    Objective   Vitals: Blood pressure 146/75, pulse 101, temperature 97 7 °F (36 5 °C), temperature source Temporal, resp  rate 22, height 5' 8\" (1 727 m), weight 91 7 kg (202 lb 2 6 oz), SpO2 93 %  ,Body mass index is 30 74 kg/m²  No intake or output data in the 24 hours ending 05/01/23 1531  Invasive Devices     Peripheral Intravenous Line  Duration           Peripheral IV 04/30/23 Left Antecubital <1 day                Physical Exam  Body mass index is 30 74 kg/m²     Gen: not in acute distress, obese  Neck/Eyes: supple, PERRL  Ear: normal appearance, no significant hearing impairment  Nose:  normal nasal mucosa, no drainage  Mouth:  unremarkable/normal appearance of lips, teeth and gums  Oropharynx: mucosa is moist, no focal lesions or erythema  Salivary glands: soft nontender  Chest: normal respiratory efforts, mild/rhonchi, " "diminished sounds posteriorly, fine crackles  CV: RRR, no murmurs appreciated, no peripheral edema  Abdomen: soft, non tender  Extremities:  No observed deformity   Skin: unremarkable  Neuro: AAO X3, no focal motor deficit       Lab Results: I have personally reviewed pertinent lab results  None    Portions of the record may have been created with voice recognition software  Occasional wrong word or \"sound a like\" substitutions may have occurred due to the inherent limitations of voice recognition software  Read the chart carefully and recognize, using context, where substitutions have occurred      "

## 2023-05-01 NOTE — ANESTHESIA PREPROCEDURE EVALUATION
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   (+) Symptomatic anemia      MUSCULOSKELETAL   (+) Chronic bilateral low back pain with bilateral sciatica   (+) Primary localized osteoarthrosis of the knee, right   (+) Primary osteoarthritis of left knee      NEURO/PSYCH   (+) Chronic bilateral low back pain with bilateral sciatica   (+) Encounter for follow-up surveillance of melanoma   (+) History of pulmonary embolism   (+) Personal history of malignant melanoma   (+) Posttraumatic stress disorder      PULMONARY   (+) Acute respiratory failure with hypoxia (HCC)   (+) Dyspnea   (+) Shortness of breath      Other   (+) Ground glass opacity present on imaging of lung   (+) Splenomegaly        Physical Exam    Airway    Mallampati score: III  TM Distance: <3 FB  Neck ROM: full     Dental   No notable dental hx     Cardiovascular  Rhythm: regular, Rate: normal, Cardiovascular exam normal    Pulmonary  Decreased breath sounds, Wheezes,     Other Findings        Anesthesia Plan  ASA Score- 4 Emergent    Anesthesia Type- general with ASA Monitors  Additional Monitors:   Airway Plan:     Comment: PULMONARY ARTERIAL TREE:  No pulmonary embolus is seen      LUNGS: New bilateral lung groundglass opacities and infiltrates, likely inflammatory/infectious  Scattered small lung nodules again visualized, suspicious for metastases  Example 5 mm right lower lung nodule image 6/62  3 mm left upper lung nodule image   6/37  There is no tracheal or endobronchial lesion          Plan Factors-    Chart reviewed  EKG reviewed  Imaging results reviewed  Existing labs reviewed  Patient summary reviewed  Patient is a current smoker  Induction- intravenous  Postoperative Plan-     Informed Consent- Anesthetic plan and risks discussed with patient

## 2023-05-01 NOTE — OCCUPATIONAL THERAPY NOTE
Occupational Therapy cancellation Note        Patient Name: Arlet Anderson  CYCFB'Z Date: 5/1/2023        OT consult received  Chart reviewed and attempted to see patient for therapy and reports increased fatigue and SOB and requesting to have therapy return at later time  Will continue to follow       Zoe Curry MS, OTR/L

## 2023-05-01 NOTE — ASSESSMENT & PLAN NOTE
Presents with nonproductive cough, chest tightness, shortness of breath, generalized weakness  Recent admission for diverticulitis  • VS: afebrile, normotensive, tachycardia, tachypnea, new o2 on 3L progressed to midflow  • Labs: CBC chronic leukocytosis, BMP hypokalemia/hypomagnesemia, lactate 2 4>1 1, BNP 26, trop wnl   • Imaging: CTA C/A/P: no PE, new bilateral GGO, scattered pulmonary/liver/inguinal nodules, osseous metastasis     Plan:   • Admit to [facility]   • Initially on 3LNC, progressed to midflow on floor, monitor for progression to HFNC/Bipap  Lungs sound clear without wheezing/crackles  Does not examine volume overloaded     • ABX: ceftriaxone/azithromycin, narrow as appropriate   • Wean oxygen as able   • Diet: regular  • Lines/Drains/Tubes: peripheral IV  • Consults: pulmonary

## 2023-05-01 NOTE — ASSESSMENT & PLAN NOTE
Known malignant melanoma, completion of whole brain radiation, briefly started on treatment with encorafenib/binimetinib  Follows with outpatient heme/onc, Dr Helio Ceja     Pending follow-up radiation therapy

## 2023-05-02 ENCOUNTER — ANESTHESIA (INPATIENT)
Dept: GASTROENTEROLOGY | Facility: HOSPITAL | Age: 68
End: 2023-05-02

## 2023-05-02 RX ORDER — ROCURONIUM BROMIDE 10 MG/ML
INJECTION, SOLUTION INTRAVENOUS AS NEEDED
Status: DISCONTINUED | OUTPATIENT
Start: 2023-05-02 | End: 2023-05-02

## 2023-05-02 RX ORDER — MIDAZOLAM HYDROCHLORIDE 2 MG/2ML
INJECTION, SOLUTION INTRAMUSCULAR; INTRAVENOUS AS NEEDED
Status: DISCONTINUED | OUTPATIENT
Start: 2023-05-02 | End: 2023-05-02

## 2023-05-02 RX ORDER — PROPOFOL 10 MG/ML
INJECTION, EMULSION INTRAVENOUS CONTINUOUS PRN
Status: DISCONTINUED | OUTPATIENT
Start: 2023-05-02 | End: 2023-05-02

## 2023-05-02 RX ORDER — LIDOCAINE HYDROCHLORIDE 20 MG/ML
INJECTION, SOLUTION EPIDURAL; INFILTRATION; INTRACAUDAL; PERINEURAL AS NEEDED
Status: DISCONTINUED | OUTPATIENT
Start: 2023-05-02 | End: 2023-05-02

## 2023-05-02 RX ORDER — ONDANSETRON 2 MG/ML
INJECTION INTRAMUSCULAR; INTRAVENOUS AS NEEDED
Status: DISCONTINUED | OUTPATIENT
Start: 2023-05-02 | End: 2023-05-02

## 2023-05-02 RX ORDER — PROPOFOL 10 MG/ML
INJECTION, EMULSION INTRAVENOUS AS NEEDED
Status: DISCONTINUED | OUTPATIENT
Start: 2023-05-02 | End: 2023-05-02

## 2023-05-02 RX ADMIN — PROPOFOL 100 MCG/KG/MIN: 10 INJECTION, EMULSION INTRAVENOUS at 15:31

## 2023-05-02 RX ADMIN — ONDANSETRON 4 MG: 2 INJECTION INTRAMUSCULAR; INTRAVENOUS at 15:31

## 2023-05-02 RX ADMIN — SUGAMMADEX 400 MG: 100 INJECTION, SOLUTION INTRAVENOUS at 15:39

## 2023-05-02 RX ADMIN — LIDOCAINE HYDROCHLORIDE 100 MG: 20 INJECTION, SOLUTION EPIDURAL; INFILTRATION; INTRACAUDAL; PERINEURAL at 15:31

## 2023-05-02 RX ADMIN — SODIUM CHLORIDE: 0.9 INJECTION, SOLUTION INTRAVENOUS at 15:15

## 2023-05-02 RX ADMIN — ROCURONIUM BROMIDE 40 MG: 10 INJECTION, SOLUTION INTRAVENOUS at 15:31

## 2023-05-02 RX ADMIN — PROPOFOL 200 MG: 10 INJECTION, EMULSION INTRAVENOUS at 15:31

## 2023-05-02 RX ADMIN — MIDAZOLAM 2 MG: 1 INJECTION INTRAMUSCULAR; INTRAVENOUS at 15:24

## 2023-05-02 NOTE — PROGRESS NOTES
[facility]  Critical Care Acceptance Note  Name: Graciela Banks  MRN: [id number]  Unit/Bed#: ICU 07 I Date of Admission: 4/30/2023   Date of Service: 5/2/2023 I Hospital Day: 2    Assessment/Plan   * Acute respiratory failure with hypoxia [facility])  Assessment & Plan  Presents with nonproductive cough, chest tightness, shortness of breath, generalized weakness  Recent admission for diverticulitis  • VS: afebrile, normotensive, tachycardia, tachypnea, new o2 on 3L progressed to midflow  • Labs: CBC chronic leukocytosis, BMP hypokalemia/hypomagnesemia, lactate 2 4>1 1, BNP 26, trop wnl   • Imaging: CTA C/A/P: no PE, new bilateral GGO, scattered pulmonary/liver/inguinal nodules, osseous metastasis   • Initially on 3LNC, progressed to midflow on floor, monitor for progression to HFNC/Bipap  Lungs sound clear without wheezing/crackles  Does not examine volume overloaded  5/1:  Increased O2 requirement this am, desat to 70s  Improved with 15L NC  Will transfer to step down given risk of decompensation, high likelihood need for HFNC   5/2:  Patient remains on 13L NC  Plan for bronchoscopy later today  Transferred to ICU post bronchoscopy  Plan:   • Continue supplemental O2, maintain O2 sat> 92%  Currently on 13L NC, wean as able  • ABX: Day#3  Abx broadened to Cefepime, Azithyromycin, and Vancomycin on 5/1  • ID consulted and discontinued azithromycin and vancomycin  • Pneumocystis, histoplasma, aspergillus, fungitell assay pending  F/u results  • Diet: regular  • Lines/Drains/Tubes: peripheral IV  • Pulmonology consulted  • S/P BAL; follow-up with results      Ground glass opacity present on imaging of lung  Assessment & Plan  New bilateral lung ground glass opacities/infiltrates  Past smoking history  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, 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   Denies history of pulmonary disease     Urine legionella/strep negative  Procal 0 28->0 59  Diffdx: interstitial pneumonia, pneumonitis, ILD, metastatic disease        Plan:  • Afebrile, chronic leukocytosis in setting of steroids, viral panel negative   • Check procal/CRP although unreliable in malignancy   • Check sputum G/S & Cx, RP2  • Pulmonary hygiene with IS, duoneb, mucinex, tessalon   • Pulmonology consulted; transferred to ICU  • F/U with BAL results      Watery diarrhea  Assessment & Plan  Hx of diverticulitis, C  Diff in the past  Remains with intermittent episodes of watery diarrhea but improving today  Without diverticulitis on CT scan     Plan:  • Prior C  Diff PCR positive but toxins negative, probable colonization  • C  Diff ordered, positive toxin on PCR in the setting of prior history of C  Dif  • Monitor with use of antibiotics   • Will start on C  Diff prophylaxis with oral vancomycin 125 mg BID    Personal history of malignant melanoma  Assessment & Plan  Known malignant melanoma, completion of whole brain radiation, briefly started on treatment with encorafenib/binimetinib  Follows with outpatient heme/onc, Dr Marcus Singer     Pending follow-up radiation therapy         Autoimmune hemolytic anemia  Assessment & Plan  History of warm antibody hemolytic anemia, 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, monitored by outpatient Heme/onc     Plan:  • Previously treated with Rituxan  • Restarted on dexamethasone, will continue   • Trend H/H, Bili       History of pulmonary embolism  Assessment & Plan  No acute PE noted this admission, maintained on Pradaxa          -------------------------------------------------------------------------------------------------------------  Chief Complaint: Weakness    History of Present Illness   HX and PE limited by: None  Desmond Florentino is a 70 y o  male with PMH of metastatic melanoma with brain mets, diverticulosis, previous history of pulmonary embolism on Pradaxa, autoimmune hemolytic anemia on chronic steroids, HTN, anxiety, depression, tobacco abuse, and GERD who presented initially with a complaint of generalized weakness and shortness of breath  He had complained of a persistent non-productive cough and had become more dyspneic even with minimal exertion  He was afebrile, normotensive, tachycardic, and tachypneic on presentation requiring initially 3L NC then midflow  CTA chest showed no evidence of pulmonary embolism but there was new bilateral ground-glass opacities and infiltrates along with previously seen small lung nodules suspicious for metastasis  He was started on empiric antibiotics with ceftriaxone and azithromycin  Bronchoscopy with BAL performed on 5/2  Patient transferred to ICU following bronchoscopy  History obtained from chart review  -------------------------------------------------------------------------------------------------------------  Dispo: Transfer to Critical Care     Code Status: Level 1 - Full Code  --------------------------------------------------------------------------------------------------------------  Review of Systems    A 12-point, complete review of systems was reviewed and negative except as stated above     Physical Exam  Constitutional:       General: He is not in acute distress  Appearance: He is ill-appearing  Cardiovascular:      Rate and Rhythm: Normal rate and regular rhythm  Pulses: Normal pulses  Heart sounds: Normal heart sounds  No murmur heard  No friction rub  No gallop  Pulmonary:      Effort: Pulmonary effort is normal  No respiratory distress  Breath sounds: Normal breath sounds  No wheezing or rhonchi  Comments: 13L, sat 94%  Abdominal:      General: Abdomen is flat  Bowel sounds are normal  There is no distension  Palpations: Abdomen is soft  Tenderness: There is no abdominal tenderness  Musculoskeletal:      Right lower leg: No edema  Left lower leg: No edema  Skin:     Capillary Refill: Capillary refill takes less than 2 seconds     Neurological:      Mental Status: He is alert and oriented to "person, place, and time  Psychiatric:         Mood and Affect: Mood normal          Behavior: Behavior normal        --------------------------------------------------------------------------------------------------------------  Vitals:   Vitals:    05/02/23 1110 05/02/23 1419 05/02/23 1600 05/02/23 1620   BP: 127/85 126/72  132/84   BP Location: Right arm   Right arm   Pulse: 86 90  (!) 112   Resp: 22 16  (!) 27   Temp: (!) 97 3 °F (36 3 °C) 99 8 °F (37 7 °C)  99 3 °F (37 4 °C)   TempSrc: Temporal Temporal  Axillary   SpO2: 93% 94% 100% 100%   Weight:    87 6 kg (193 lb 2 oz)   Height:         Temp  Min: 96 8 °F (36 °C)  Max: 99 8 °F (37 7 °C)  IBW (Ideal Body Weight): 68 4 kg  Height: 5' 8\" (172 7 cm)  Body mass index is 29 36 kg/m²  Laboratory and Diagnostics:  Results from last 7 days   Lab Units 05/02/23  0501 04/30/23  1539   WBC Thousand/uL 18 07* 17 87*   HEMOGLOBIN g/dL 10 6* 11 9*   HEMATOCRIT % 32 0* 35 8*   PLATELETS Thousands/uL 327 378   BANDS PCT %  --  1   MONO PCT %  --  2*     Results from last 7 days   Lab Units 05/02/23  0501 04/30/23  1539   SODIUM mmol/L 134* 135   POTASSIUM mmol/L 3 6 3 0*   CHLORIDE mmol/L 101 101   CO2 mmol/L 26 24   ANION GAP mmol/L 7 10   BUN mg/dL 26* 31*   CREATININE mg/dL 1 19 1 25   CALCIUM mg/dL 8 2* 8 6   GLUCOSE RANDOM mg/dL 149* 233*   ALT U/L 23 39   AST U/L 25 38   ALK PHOS U/L 53 67   ALBUMIN g/dL 2 8* 3 1*   TOTAL BILIRUBIN mg/dL 0 60 0 84     Results from last 7 days   Lab Units 04/30/23  1539   MAGNESIUM mg/dL 1 5*               Results from last 7 days   Lab Units 04/30/23  1849 04/30/23  1539   LACTIC ACID mmol/L 1 1 2 4*     ABG:    VBG:    Results from last 7 days   Lab Units 05/02/23  0501 05/01/23  0520 04/30/23  1539   PROCALCITONIN ng/ml 0 73* 0 59* 0 28*       Micro:  Results from last 7 days   Lab Units 05/01/23  0946 05/01/23  0011 05/01/23  0010 04/30/23  2326   BLOOD CULTURE   --   --   --  No Growth at 24 hrs  No Growth at 24 hrs     MRSA " CULTURE ONLY   --   --  No Methicillin Resistant Staphlyococcus aureus (MRSA) isolated  --    LEGIONELLA URINARY ANTIGEN   --  Negative  --   --    STREP PNEUMONIAE ANTIGEN, URINE   --  Negative  --   --    C DIFF TOXIN B BY PCR  Positive*  --   --   --        EKG: Telemetry showing sinus tachycardia  Imaging: I have personally reviewed pertinent reports          Historical Information   Past Medical History:   Diagnosis Date   • Acute diverticulitis 4/12/2023   • ARABELLA (acute kidney injury) ([facility] ) 3/29/2018   • Anemia 11/02/2016   • Anxiety    • Arthritis    • Autoimmune hemolytic anemia ([facility] )    • Claustrophobia    • COVID 12/2020   • DVT (deep venous thrombosis) (HCC)    • GERD (gastroesophageal reflux disease)    • Hearing loss, right    • Hemolytic anemia (HCC)    • History of transfusion     2018 - no adverse reaction   • Hypertension    • Malignant melanoma of leg, right ([facility] ) 07/01/2022   • Palpitation    • Portal vein thrombosis    • PTSD (post-traumatic stress disorder)    • Pulmonary emboli (HCC)    • Squamous cell skin cancer 12/20/2022    SCCIS- 12/6/22   • Tobacco abuse      Past Surgical History:   Procedure Laterality Date   • CATARACT EXTRACTION Bilateral    • CHOLECYSTECTOMY  07/18/2017   • COLONOSCOPY     • ELBOW ARTHROPLASTY Left     bursectomy   • IR BIOPSY RETROPERITONEUM  3/17/2023   • IR DRAINAGE TUBE CHECK WITH SCLEROSIS  10/06/2022   • IR DRAINAGE TUBE CHECK WITH SCLEROSIS  10/10/2022   • IR DRAINAGE TUBE CHECK WITH SCLEROSIS  10/20/2022   • IR DRAINAGE TUBE CHECK WITH SCLEROSIS  10/27/2022   • IR DRAINAGE TUBE CHECK WITH SCLEROSIS  11/04/2022   • IR DRAINAGE TUBE CHECK WITH SCLEROSIS  11/15/2022   • IR DRAINAGE TUBE CHECK WITH SCLEROSIS  11/25/2022   • IR DRAINAGE TUBE PLACEMENT  09/19/2022   • JOINT REPLACEMENT Right 02/02/2021    knee   • KNEE SURGERY Right     meniscus tear   • LYMPH NODE BIOPSY Right 07/29/2022    Procedure: BIOPSY LYMPH NODE SENTINEL;  Surgeon: Justin Mascorro MD; [facility]: [facility];  Service: Surgical Oncology   • MOHS SURGERY Right 12/20/2022    Right dorsal hand HUMPHREYIS-Dr Isabel   • PA ARTHRP KNE CONDYLE&PLATU MEDIAL&LAT COMPARTMENTS Right 02/02/2021    Procedure: ARTHROPLASTY KNEE TOTAL;  Surgeon: Lucio Thompson MD;  [facility];  Service: Orthopedics   • PA ARTHRP KNE CONDYLE&PLATU MEDIAL&LAT COMPARTMENTS Left 11/17/2021    Procedure: TOTAL KNEE REPLACEMENT;  Surgeon: Lucio Thompson MD;  [facility];  Service: Orthopedics   • PA LAPS SURG CHOLECYSTECTOMY Neomia Achilles N/A 12/23/2017    Procedure: CHOLECYSTECTOMY LAPAROSCOPIC with cholangiogram;  Surgeon: Jania Griffin MD;  [facility];  Service: General   • PA SPLENECTOMY TOTAL SEPARATE PROCEDURE N/A 05/18/2017    Procedure: LAPAROSCOPIC HAND ASSIST SPLENECTOMY;  Surgeon: Nayeli Zhou MD;  [facility];  Service: Surgical Oncology   • SHOULDER SURGERY Left     rotator cuff x4, reconstruction   • SKIN BIOPSY  5 May 2022   • SKIN CANCER EXCISION  29 July 2022   • SKIN LESION EXCISION Right 07/29/2022    Procedure: WIDE EXCISION RIGHT MEDIAL THIGH;  Surgeon: Jameel Henriquez MD;  [facility];  Service: Surgical Oncology     Social History   Social History     Substance and Sexual Activity   Alcohol Use Yes   • Alcohol/week: 6 0 standard drinks   • Types: 6 Cans of beer per week    Comment: socially     Social History     Substance and Sexual Activity   Drug Use Yes   • Types: Marijuana    Comment: medical marijuana     Social History     Tobacco Use   Smoking Status Some Days   • Packs/day: 0 00   • Years: 5 00   • Pack years: 0 00   • Types: Cigars, Cigarettes   Smokeless Tobacco Current   • Types: Snuff   Tobacco Comments    5  a week average     Exercise History: N/A  Family History:   Family History   Problem Relation Age of Onset   • Cancer Mother    • Breast cancer Mother    • Other Father         CABG   • Heart attack Paternal Grandfather         acute MI     I have reviewed this patient's family history and commented on sigificant items within the HPI      Medications:  Current Facility-Administered Medications   Medication Dose Route Frequency   • acetaminophen (TYLENOL) tablet 650 mg  650 mg Oral Q6H PRN   • albuterol (PROVENTIL HFA,VENTOLIN HFA) inhaler 2 puff  2 puff Inhalation Q4H PRN   • aluminum-magnesium hydroxide-simethicone (MYLANTA) oral suspension 30 mL  30 mL Oral Q6H PRN   • benzonatate (TESSALON PERLES) capsule 100 mg  100 mg Oral TID PRN   • cefepime (MAXIPIME) 2 g/50 mL dextrose IVPB  2,000 mg Intravenous Q12H   • dabigatran etexilate (PRADAXA) capsule 150 mg  150 mg Oral Q12H RAVIN   • dexamethasone (DECADRON) tablet 4 mg  4 mg Oral Q8H   • dextromethorphan-guaiFENesin (ROBITUSSIN DM) oral syrup 10 mL  10 mL Oral Q4H PRN   • hydrochlorothiazide (HYDRODIURIL) tablet 25 mg  25 mg Oral Daily   • memantine (NAMENDA) tablet 10 mg  10 mg Oral BID   • metoprolol succinate (TOPROL-XL) 24 hr tablet 12 5 mg  12 5 mg Oral Daily   • ondansetron (ZOFRAN) injection 4 mg  4 mg Intravenous Q6H PRN   • pantoprazole (PROTONIX) EC tablet 40 mg  40 mg Oral Early Morning   • polyethylene glycol (MIRALAX) packet 17 g  17 g Oral Daily PRN   • prazosin (MINIPRESS) capsule 1 mg  1 mg Oral HS   • sodium chloride 0 9 % infusion  25 mL/hr Intravenous Continuous   • vancomycin (VANCOCIN) capsule 125 mg  125 mg Oral Q12H [facility]     Home medications:  Prior to Admission Medications   Prescriptions Last Dose Informant Patient Reported? Taking?    ALPRAZolam (XANAX) 0 5 mg tablet Past Month  No Yes   Sig: Take 1 tablet (0 5 mg total) by mouth 2 (two) times a day Take one tablet one hour before scan and bring the second one with you to take right before the MRI if needed   VITAMIN D PO 4/30/2023  Yes Yes   Sig: Take 1,000 Units by mouth daily    acetaminophen (TYLENOL) 325 mg tablet Past Week  No Yes   Sig: Take 2 tablets (650 mg total) by mouth every 6 (six) hours as needed for mild pain ascorbic acid (VITAMIN C) 1000 MG tablet   No No   Sig: Take 1 tablet (1,000 mg total) by mouth every 12 (twelve) hours for 6 doses   Patient taking differently: Take 1,000 mg by mouth daily Every other day   binimetinib (MEKTOVI) 15 MG tablet   Yes Yes   Sig: Take 45 mg by mouth every 12 (twelve) hours   dabigatran etexilate (PRADAXA) 150 mg capsu 4/30/2023  No Yes   Sig: Take 1 capsule (150 mg total) by mouth every 12 (twelve) hours   dexamethasone (DECADRON) 4 mg tablet 4/30/2023  No Yes   Sig: Take 1 tablet (4 mg total) by mouth every 8 (eight) hours Please make sure you're on Protonix 40 mg daily for GI prophylaxis   encorafenib (BRAFTOVI) 50 MG capsule   Yes Yes   Sig: Take 450 mg by mouth daily   hydrochlorothiazide (HYDRODIURIL) 25 mg tablet 4/30/2023  No Yes   Sig: Take 1 tablet (25 mg total) by mouth daily   memantine (Namenda) 10 mg tablet 4/30/2023  No Yes   Sig: Take 1 tablet (10 mg total) by mouth 2 (two) times a day   metoprolol succinate (TOPROL-XL) 25 mg 24 hr tablet 4/30/2023  No Yes   Sig: Take 0 5 tablets (12 5 mg total) by mouth daily   ondansetron (ZOFRAN) 4 mg tablet Past Month  No Yes   Sig: Take 1 tablet (4 mg total) by mouth every 8 (eight) hours as needed for nausea or vomiting   pantoprazole (PROTONIX) 40 mg tablet 4/30/2023  No Yes   Sig: Take 1 tablet (40 mg total) by mouth daily   potassium chloride (K-DUR,KLOR-CON) 20 mEq tablet 4/30/2023  No Yes   Sig: Take 1 tablet (20 mEq total) by mouth daily   prazosin (MINIPRESS) 1 mg capsule 4/29/2023  Yes Yes   Sig: Take 1 mg by mouth daily at bedtime       Facility-Administered Medications: None     Allergies:  No Known Allergies    ------------------------------------------------------------------------------------------------------------  Advance Directive and Living Will:      Power of :    POLST:    ------------------------------------------------------------------------------------------------------------  Anticipated Length of "Stay is > 2 midnights    Care Time Delivered:   Please refer to attending attestation  Lorraine Toledo,         Portions of the record may have been created with voice recognition software  Occasional wrong word or \"sound a like\" substitutions may have occurred due to the inherent limitations of voice recognition software    Read the chart carefully and recognize, using context, where substitutions have occurred  "

## 2023-05-02 NOTE — PROGRESS NOTES
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  Denies history of pulmonary disease     Urine legionella/strep negative  Procal 0 28->0 59  Diffdx: interstitial pneumonia, pneumonitis, ILD, metastatic disease        Plan:  • Afebrile, chronic leukocytosis in setting of steroids, viral panel negative   • Check procal/CRP although unreliable in malignancy   • Check sputum G/S & Cx, RP2  • Pulmonary hygiene with IS, duoneb, mucinex, tessalon   • Consult: pulmonary     Watery diarrhea  Assessment & Plan  Hx of diverticulitis, C  Diff in the past  Remains with intermittent episodes of watery diarrhea but improving today  Without diverticulitis on CT scan     Plan:  • Prior C  Diff PCR positive but toxins negative, probable colonization  • C  Diff ordered, results now pending  • Monitor with use of antibiotics       Personal history of malignant melanoma  Assessment & Plan  Known malignant melanoma, completion of whole brain radiation, briefly started on treatment with encorafenib/binimetinib  Follows with outpatient heme/onc, Dr Helio Ceja     Pending follow-up radiation therapy     Autoimmune hemolytic anemia  Assessment & Plan  History of warm antibody hemolytic anemia, 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, monitored by outpatient Heme/onc     Plan:  • Previously treated with Rituxan  • Restarted on dexamethasone, will continue   • Trend H/H, Bili       History of pulmonary embolism  Assessment & Plan  No acute PE noted this admission, maintained on Pradaxa     * Acute respiratory failure with hypoxia [facility])  Assessment & Plan  Presents with nonproductive cough, chest tightness, shortness of breath, generalized weakness  Recent admission for diverticulitis  • VS: afebrile, normotensive, tachycardia, tachypnea, new o2 on 3L progressed to midflow  • Labs: CBC chronic leukocytosis, BMP hypokalemia/hypomagnesemia, lactate 2 4>1 1, BNP 26, trop wnl   • Imaging: CTA C/A/P: no PE, new bilateral GGO, scattered pulmonary/liver/inguinal nodules, osseous metastasis   • Initially on 3LNC, progressed to midflow on floor, monitor for progression to HFNC/Bipap  Lungs sound clear without wheezing/crackles  Does not examine volume overloaded  5/1:  Increased O2 requirement this am, desat to 70s  Improved with 15L NC  Will transfer to step down given risk of decompensation, high likelihood need for HFNC   5/2:  Patient remains on 13L NC  Plan for bronchoscopy later today  Plan:   • Continue supplemental O2, maintain O2 sat> 92%  Currently on 13L NC, wean as able  • ABX: Day#3  Abx broadened to Cefepime, Azithyromycin, and Vancomycin on 5/1  Continue current regimen, narrow as appropriate  • Pneumocystis, histoplasma, aspergillus, fungitell assay pending  F/u results  • Diet: regular  • Lines/Drains/Tubes: peripheral IV  • Pulmonology consulted, appreciate recommendations             Communication Plan  · Patient with acute respiratory failure, on 13L  Maintain O2 Sat >92%  · Continue broad spectrum abx: cefepime/vanc/azithromycin  · Plan for bronchoscopy today          VTE Pharmacologic Prophylaxis:   Pharmacologic: Dabigatran (Pradaxa)  Mechanical VTE Prophylaxis in Place: Yes    Patient Centered Rounds: I have performed bedside rounds with nursing staff today  Discussions with Specialists or Other Care Team Provider: Case management    Education and Discussions with Family / Patient: Discussed with patient at bedside    Time Spent for Care: 45 minutes  More than 50% of total time spent on counseling and coordination of care as described above      Current Length of Stay: 2 day(s)    Current Patient Status: Inpatient   Certification Statement: The patient will continue to require additional inpatient hospital stay due to acute respitroay failure, broad spectrum IV antibiotics\    Discharge Plan: 48-72 hours, pending bronchoscopy  Code Status: Level 1 - Full Code      Subjective:   Patient reports feeling same as yesterday  Has complaint of SOB and non-productive cough  Objective:     Vitals:   Temp (24hrs), Av 3 °F (36 3 °C), Min:96 8 °F (36 °C), Max:97 7 °F (36 5 °C)    Temp:  [96 8 °F (36 °C)-97 7 °F (36 5 °C)] 97 3 °F (36 3 °C)  HR:  [] 86  Resp:  [22] 22  BP: ()/(55-85) 127/85  SpO2:  [91 %-96 %] 93 %  Body mass index is 30 74 kg/m²  Input and Output Summary (last 24 hours): Intake/Output Summary (Last 24 hours) at 2023 1259  Last data filed at 2023 0941  Gross per 24 hour   Intake --   Output 600 ml   Net -600 ml       Physical Exam:     Physical Exam  Constitutional:       General: He is not in acute distress  Appearance: He is ill-appearing  Cardiovascular:      Rate and Rhythm: Normal rate and regular rhythm  Pulses: Normal pulses  Heart sounds: Normal heart sounds  No murmur heard  No friction rub  No gallop  Pulmonary:      Effort: Pulmonary effort is normal  No respiratory distress  Breath sounds: Normal breath sounds  No wheezing or rhonchi  Comments: 13L, sat 94%  Abdominal:      General: Abdomen is flat  Bowel sounds are normal  There is no distension  Palpations: Abdomen is soft  Tenderness: There is no abdominal tenderness  Musculoskeletal:      Right lower leg: No edema  Left lower leg: No edema  Skin:     Capillary Refill: Capillary refill takes less than 2 seconds  Neurological:      Mental Status: He is alert and oriented to person, place, and time     Psychiatric:         Mood and Affect: Mood normal          Behavior: Behavior normal            Additional Data: Labs:    Results from last 7 days   Lab Units 05/02/23  0501 04/30/23  1539   WBC Thousand/uL 18 07* 17 87*   HEMOGLOBIN g/dL 10 6* 11 9*   HEMATOCRIT % 32 0* 35 8*   PLATELETS Thousands/uL 327 378   BANDS PCT %  --  1   LYMPHO PCT %  --  7*   MONO PCT %  --  2*   EOS PCT %  --  3     Results from last 7 days   Lab Units 05/02/23  0501   SODIUM mmol/L 134*   POTASSIUM mmol/L 3 6   CHLORIDE mmol/L 101   CO2 mmol/L 26   BUN mg/dL 26*   CREATININE mg/dL 1 19   ANION GAP mmol/L 7   CALCIUM mg/dL 8 2*   ALBUMIN g/dL 2 8*   TOTAL BILIRUBIN mg/dL 0 60   ALK PHOS U/L 53   ALT U/L 23   AST U/L 25   GLUCOSE RANDOM mg/dL 149*         Results from last 7 days   Lab Units 05/02/23  1109 05/02/23  0804   POC GLUCOSE mg/dl 133 119         Results from last 7 days   Lab Units 05/02/23  0501 05/01/23  0520 04/30/23  1849 04/30/23  1539   LACTIC ACID mmol/L  --   --  1 1 2 4*   PROCALCITONIN ng/ml 0 73* 0 59*  --  0 28*           * I Have Reviewed All Lab Data Listed Above  * Additional Pertinent Lab Tests Reviewed: All Labs Within Last 24 Hours Reviewed    Imaging:    Imaging Reports Reviewed Today Include: N/A    Recent Cultures (last 7 days):     Results from last 7 days   Lab Units 05/01/23  0011 04/30/23  2326   BLOOD CULTURE   --  No Growth at 24 hrs  No Growth at 24 hrs     LEGIONELLA URINARY ANTIGEN  Negative  --        Last 24 Hours Medication List:   Current Facility-Administered Medications   Medication Dose Route Frequency Provider Last Rate   • acetaminophen  650 mg Oral Q6H PRN Shabana Pipe, PA-C     • albuterol  2 puff Inhalation Q4H PRN Pervis Nathaniel, DO     • aluminum-magnesium hydroxide-simethicone  30 mL Oral Q6H PRN Shabana Pipe, PA-C     • benzonatate  100 mg Oral TID PRN Shabana Pipe, PA-C     • cefepime  2,000 mg Intravenous Q12H Vince Seaman, DO 2,000 mg (05/02/23 6197)   • dabigatran etexilate  150 mg Oral Q12H [address] Shabana Montesinos PA-C     • dexamethasone  4 mg Oral Q8H Shabana Montesinos PA-C     • dextromethorphan-guaiFENesin  10 mL Oral Q4H PRN Edwar Monsivais PA-C     • hydrochlorothiazide  25 mg Oral Daily Edwar Monsivais PA-C     • memantine  10 mg Oral BID Edwar Monsivais PA-C     • metoprolol succinate  12 5 mg Oral Daily Edwar Monsivais PA-C     • ondansetron  4 mg Intravenous Q6H PRN Edwar Monsivais PA-C     • pantoprazole  40 mg Oral Early Morning Edwar Monsivais PA-C     • polyethylene glycol  17 g Oral Daily PRN Edwar Monsivais PA-C     • prazosin  1 mg Oral HS Edwar Monsivais PA-C     • vancomycin  750 mg Intravenous Q12H Sun Lavell,  mg (05/02/23 0809)        Today, Patient Was Seen By: Peggy Gage MD    ** Please Note: Dictation voice to text software may have been used in the creation of this document   **

## 2023-05-02 NOTE — ASSESSMENT & PLAN NOTE
History of warm antibody hemolytic anemia  Previously treated with Rituxan (8/2022 last received)  • Continue chronic steroid treatment with Dexamethasone 4mg Q8 hours  • Appreciate hematology-oncology recommendations

## 2023-05-02 NOTE — ASSESSMENT & PLAN NOTE
Hx of diverticulitis, C  Diff in the past  Remains with intermittent episodes of watery diarrhea but improving today  Without diverticulitis on CT scan     Plan:  • Prior C  Diff PCR positive but toxins negative, probable colonization  • C  Diff ordered, positive toxin on PCR in the setting of prior history of C  Dif  • Monitor with use of antibiotics   • Will start on C   Diff prophylaxis with oral vancomycin 125 mg BID

## 2023-05-02 NOTE — PROGRESS NOTES
"Progress Note - Pulmonary   Swati Castillo 70 y o  male MRN: [id number]  Unit/Bed#: E4 -01 Encounter: [id number]      Assessment:  1  Acute hypoxic respiratory failure  2  Abnormal CT chest  · Bilateral GGO's/patchy not to a certain lobe new compared to last imaging on 4/2020 12  · DDx: In acute settings: Infection-atypical/opportunistic/PCP given the active chemotherapy/steroids for melanoma vs inflammatory process/given the peripheral eosinophilia vs chemotherapy related pneumonitis, other consideration: Fluid/pulmonary edema however no more recent TTE, or active infiltrative process, known autoimmune hemolytic anemia/metastatic melanoma  3  Multiple pulmonary nodules  4  Hx metastatic melanoma-to the brain/likely to the lungs/nodules  5  Tobacco abuse  6  Class I obesity    Plan:  · Continue broad spectrum antibiotics  · Continue current dose of steroids dexamethasone 4 mg every 8, would not consider pulse steroids until the bronchoscopy/infection is ruled out  · Discontinued the chemotherapy yesterday  · Flexible bronchoscopy/airway exam and BAL this afternoon  Discussed risks and benefits, risks includes postprocedure respiratory failure/remains on mechanical ventilation and patient is agreeable    Subjective:   No overnight acute events  Remains on 13 LPM mid flow  Breathing slightly easier compared to the admission time however not changed from yesterday  Cough is mainly dry could not give the sputum sample  Vitals: Blood pressure 127/85, pulse 86, temperature (!) 97 3 °F (36 3 °C), temperature source Temporal, resp  rate 22, height 5' 8\" (1 727 m), weight 91 7 kg (202 lb 2 6 oz), SpO2 93 %  , Body mass index is 30 74 kg/m²  Intake/Output Summary (Last 24 hours) at 5/2/2023 1349  Last data filed at 5/2/2023 0941  Gross per 24 hour   Intake --   Output 600 ml   Net -600 ml       Physical Exam  Gen: not in acute distress, obese, Body mass index is 30 74 kg/m²     HEENT:supple, no accessory " muscle use, no JVD appreciated  Cardiac: RRR, no murmurs appreciated  Lungs: normal respiratory effort, diminished air movement, fine crackles/scattered and posteriorly diminished but clear sounds anteriorly  Abdomen: soft, nontender, normoactive bowel sounds  Extremities: No peripheral edema  Neuro: AAO X3, no focal motor deficit    Labs: I have personally reviewed pertinent lab results          Suman Olmstead MD

## 2023-05-02 NOTE — PROGRESS NOTES
Vancomycin Monitoring    Demographics    Josue Lewis    Assessment    Today is day 1 of vancomycin therapy for pneumonia  Patient received first dose of vancomycin of 1,500 mg today at approx 1800  Renal function may be slightly elevated from baseline  MRSA nares pending     Plan    Will continue with vancomycin 750 mg q12h starting 5/2 at 0800    Check BMP w/ am labs  Level ordered for 5/3 w/ am labs     Glynn Nageotte, PharmD

## 2023-05-02 NOTE — ASSESSMENT & PLAN NOTE
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

## 2023-05-02 NOTE — ASSESSMENT & PLAN NOTE
Presents with nonproductive cough, chest tightness, shortness of breath, generalized weakness  Recent admission for diverticulitis  • VS: afebrile, normotensive, tachycardia, tachypnea, new o2 on 3L progressed to midflow  • Labs: CBC chronic leukocytosis, BMP hypokalemia/hypomagnesemia, lactate 2 4>1 1, BNP 26, trop wnl   • Imaging: CTA C/A/P: no PE, new bilateral GGO, scattered pulmonary/liver/inguinal nodules, osseous metastasis   • Initially on 3LNC, progressed to midflow on floor, monitor for progression to HFNC/Bipap  Lungs sound clear without wheezing/crackles  Does not examine volume overloaded  5/1:  Increased O2 requirement this am, desat to 70s  Improved with 15L NC  Will transfer to step down given risk of decompensation, high likelihood need for HFNC   5/2:  Patient remains on 13L NC  Plan for bronchoscopy later today  Transferred to ICU post bronchoscopy  Plan:   • Continue supplemental O2, maintain O2 sat> 92%  Currently on 13L NC, wean as able  • ABX: Day#3  Abx broadened to Cefepime, Azithyromycin, and Vancomycin on 5/1  • ID consulted and discontinued azithromycin and vancomycin  • Pneumocystis, histoplasma, aspergillus, fungitell assay pending   F/u results  • Diet: regular  • Lines/Drains/Tubes: peripheral IV  • Pulmonology consulted  • S/P BAL; follow-up with results

## 2023-05-02 NOTE — ASSESSMENT & PLAN NOTE
Known malignant melanoma, completion of whole brain radiation, briefly started on treatment with encorafenib/binimetinib  Follows with outpatient heme/onc, Dr Jimenez Gale     Pending follow-up radiation therapy

## 2023-05-02 NOTE — ASSESSMENT & PLAN NOTE
Hx of diverticulitis, C  Diff in the past  C  Diff PCR positive, but negative toxins     • Continue po vancomycin prophylaxis

## 2023-05-02 NOTE — OCCUPATIONAL THERAPY NOTE
Occupational Therapy cancellation note        Patient Name: Adal CARMEN Date: 5/2/2023    OT consult received and chart reviewed  Attempted to see patient for OT session and pt declined stating he is still too short of breath and wants to wait until after his procedure this afternoon, RN made aware  Educated pt on sitting upright in bed and EOB for meals, pt receptive  Will continue to follow      Zoe Curry MS, OTR/L

## 2023-05-02 NOTE — ASSESSMENT & PLAN NOTE
Biopsy confirmed 3/17/2023  Right peritoneum consistent with malignant melanoma with brain mets  BRAF mutation    · Encorafenib and Binimetinib started April 2023 (Currently on hold)

## 2023-05-02 NOTE — CONSULTS
Consultation - [facility] Infectious Disease   Lesia Brown 70 y o  male MRN: [id number]  Unit/Bed#: E4 -01 Encounter: [id number]      IMPRESSION & RECOMMENDATIONS:   1  New bilateral GGO  CXR and CT chest show new bilateral GGO  Consider infectious vs inflammatory etiologies in this chronically immunosuppressed patient  RP2 negative and COVID19 negative, urinary Ag negative  Sputum cx pending  PCT mildly elevated though likely low yield due to underlying malignancy  Low suspicion for fungal etiology given lack of cavitary lesions on imaging  Low suspicion for bacterial etiology  Unlikely aspergillosis without underlying parenchymal lung disease and absence of neutropenia  Consider possibility of opportunistic infection like pneumocystis  In the setting of known pulmonary mets, consider possibility of DAH  Patient lived in [place] for 15 years, thus consider possibility of TB, though no other RF for such  Consider autoimmune or drug related pneumonitis, though patient has not had recent chemo and last dose of Rituxan was several years ago  Pulmonology following with plan for bronchoscopy for BAL today    -continue IV cefepime and Vancomycin for now   -Rx consult for vanco trough management   -can discontinue azithromycin given negative RP2 and urinary Ag  -follow up bronchoscopy findings today; in addition to routine BAL cx, please send for PCP DFA, histoplasma Ag and aspergillus Ag  -will send serum 1,3-Beta-D-Glucan   -follow-up sputum culture   -follow-up blood cultures   -monitor CBCD, temperature and hemodynamics     2  Acute respiratory failure with hypoxia  This is presumed secondary to #1  Patient continues to have tenuous respiratory status  He is requiring 12L [address]  -work up as above  -close pulmonology follow up  -continue respiratory protocol and pulmonary toileting     3  Leukocytosis  Suspect some component of steroid induced leukocytosis  Blood cultures are negative >24 hours   He is "afebrile  He is hemodynamically stable    -follow up blood cultures  -monitor CBCd, temperature and hemodynamics      4  Hx of malignant melanoma, mets to brain, lung, liver, LN, bone  S/p whole brain radiation  Briefly started on treatment with encorafenib/binimetinib and was supposed to restart on 4/3 but now on hold due to recent acute illness  Follows with outpatient heme/onc, Dr Oly Veronica   -close onc follow up      5  Chronic diarrhea  Known hx of C diff in the past  Recent admission for diverticulitis  CT A/P negative  Prior C  Diff PCR positive with negative EIA  Likely colonized  Repeat C diff pending    -follow up C diff PCR     6  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, 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  Follows outpatient heme/onc  Previously treated with Rituxan and recently restarted on dexamethasone for #3  Follows Dr Kelsy Mann    -monitor CBC    7  Hx PE on Pradaxa  CTA chest negative for VTE  Antibiotics:  IV cefepime   IV vancomycin     I have discussed the above management plan in detail with patient  I have discussed the above management plan in detail with patient's RN, and the primary service, SLIM attending  ID consult service will continue to follow  HISTORY OF PRESENT ILLNESS:  Reason for Consult: \"atypical pneumonia, pulm recommends ID eval; sepsis\"    HPI: Adal Calhoun is a 70y o  year old male with metastatic melanoma with brain mets, AI hemolytic anemia who presented to [facility] on 4/30 with diarrhea and generalized weakness after a recent admission for diverticulitis and ARABELLA x2 4/11 and again 4/22 during which he c/o SOB  This time, pt also reported development of cough, SOB, with minimal exertion  Cough is non-productive  CXR last admission 4/22 was negative and CT chest did not have acute findings though did show known pulmonary mets  He underwent CXR this admission 4/30 which showed patchy GGO bilaterally that may be due to edema vs atypical PNA   CTA chest negative fro acute " PE but did again demonstrate new b/l GGO and infiltrates, infectious vs inflammatory with scattered lung nodules suspicious for mets  Patient is on IV azithro, vanco, and cefepime  He had negative RP2 and urinary Ag  ID consulted and attempted thorough H&P  Patient reports ongoing sx for about 1 month including dry cough and SOB with minimal exertion and ordinary activities like taking 10 dteps  He denies recent chemo infusions and states he actually never received any treatment for his melanoma apart from recently completing whole brain radiation  He has not been on rituxan for several years  States he has been on prednisone on and off for 6 to 7 years and was recently started on decadron bby his melanoma oncologist and radiation oncologist  He reports recent abx use for diverticulitis but has not sought additional treatments for his respiratory sx apart from his recent hospitalizations  He denies any recent travel or known sick contacts  No recent prolonged exposures to animal excrement like birds, bats, etc  He worked for [employer] in [employer] [facility] [employer] for 15 years and retired 6-7 years ago  He denies known TB exposures and is unsure if he has been tested for it in the past  He denies prior homelessness or incarceration  He currently lives at home with his wife and 3 dogs and has 2 grown adult children  REVIEW OF SYSTEMS:  A complete review of systems is negative other than that noted in the HPI      PAST MEDICAL HISTORY:  Past Medical History:   Diagnosis Date   • Acute diverticulitis 4/12/2023   • ARABELLA (acute kidney injury) ([facility] ) 3/29/2018   • Anemia 11/02/2016   • Anxiety    • Arthritis    • Autoimmune hemolytic anemia ([facility] )    • Claustrophobia    • COVID 12/2020   • DVT (deep venous thrombosis) ([facility])    • GERD (gastroesophageal reflux disease)    • Hearing loss, right    • Hemolytic anemia (HCC)    • History of transfusion     2018 - no adverse reaction   • Hypertension    • Malignant melanoma of leg, right ([facility] [address] ) 07/01/2022   • Palpitation    • Portal vein thrombosis    • PTSD (post-traumatic stress disorder)    • Pulmonary emboli (HCC)    • Squamous cell skin cancer 12/20/2022    SCCIS- 12/6/22   • Tobacco abuse      Past Surgical History:   Procedure Laterality Date   • CATARACT EXTRACTION Bilateral    • CHOLECYSTECTOMY  07/18/2017   • COLONOSCOPY     • ELBOW ARTHROPLASTY Left     bursectomy   • IR BIOPSY RETROPERITONEUM  3/17/2023   • IR DRAINAGE TUBE CHECK WITH SCLEROSIS  10/06/2022   • IR DRAINAGE TUBE CHECK WITH SCLEROSIS  10/10/2022   • IR DRAINAGE TUBE CHECK WITH SCLEROSIS  10/20/2022   • IR DRAINAGE TUBE CHECK WITH SCLEROSIS  10/27/2022   • IR DRAINAGE TUBE CHECK WITH SCLEROSIS  11/04/2022   • IR DRAINAGE TUBE CHECK WITH SCLEROSIS  11/15/2022   • IR DRAINAGE TUBE CHECK WITH SCLEROSIS  11/25/2022   • IR DRAINAGE TUBE PLACEMENT  09/19/2022   • JOINT REPLACEMENT Right 02/02/2021    knee   • KNEE SURGERY Right     meniscus tear   • LYMPH NODE BIOPSY Right 07/29/2022    Procedure: BIOPSY LYMPH NODE SENTINEL;  Surgeon: Dev Lockhart MD;  [facility]: [facility];  Service: Surgical Oncology   • MOHS SURGERY Right 12/20/2022    Right dorsal hand SCCIS-Dr Isabel   • AZ ARTHRP KNE CONDYLE&PLATU MEDIAL&LAT COMPARTMENTS Right 02/02/2021    Procedure: ARTHROPLASTY KNEE TOTAL;  Surgeon: Mercy Dunbar MD;  [facility];  Service: Orthopedics   • AZ ARTHRP KNE CONDYLE&PLATU MEDIAL&LAT COMPARTMENTS Left 11/17/2021    Procedure: TOTAL KNEE REPLACEMENT;  Surgeon: Mercy Dunbar MD;  [facility];  Service: Orthopedics   • AZ LAPS SURG CHOLECYSTECTOMY Gopal Kohut N/A 12/23/2017    Procedure: CHOLECYSTECTOMY LAPAROSCOPIC with cholangiogram;  Surgeon: Sueann Simmonds, MD;  [facility];  Service: General   • AZ SPLENECTOMY TOTAL SEPARATE PROCEDURE N/A 05/18/2017    Procedure: LAPAROSCOPIC HAND ASSIST SPLENECTOMY;  Surgeon: Estefania Laws MD;  [facility];  Service: Surgical Oncology   • SHOULDER SURGERY Left     rotator cuff x4, reconstruction   • SKIN BIOPSY  5 May 2022   • SKIN CANCER EXCISION  2022   • SKIN LESION EXCISION Right 2022    Procedure: WIDE EXCISION RIGHT MEDIAL THIGH;  Surgeon: Antwon Oropeza MD;  [facility]: [facility];  Service: Surgical Oncology       FAMILY HISTORY:  Non-contributory    SOCIAL HISTORY:  Social History   Social History     Substance and Sexual Activity   Alcohol Use Yes   • Alcohol/week: 6 0 standard drinks   • Types: 6 Cans of beer per week    Comment: socially     Social History     Substance and Sexual Activity   Drug Use Yes   • Types: Marijuana    Comment: medical marijuana     Social History     Tobacco Use   Smoking Status Some Days   • Packs/day: 0 00   • Years: 5 00   • Pack years: 0 00   • Types: Cigars, Cigarettes   Smokeless Tobacco Current   • Types: Snuff   Tobacco Comments    5  a week average       ALLERGIES:  No Known Allergies    MEDICATIONS:  All current active medications have been reviewed      PHYSICAL EXAM:  Temp:  [96 8 °F (36 °C)-97 7 °F (36 5 °C)] 97 1 °F (36 2 °C)  HR:  [] 78  Resp:  [22] 22  BP: ()/(55-78) 123/77  SpO2:  [76 %-96 %] 94 %  Temp (24hrs), Av 3 °F (36 3 °C), Min:96 8 °F (36 °C), Max:97 7 °F (36 5 °C)  Current: Temperature: (!) 97 1 °F (36 2 °C)    Intake/Output Summary (Last 24 hours) at 2023 0901  Last data filed at 2023 0012  Gross per 24 hour   Intake --   Output 50 ml   Net -50 ml       General Appearance:  Appearing chronically ill and globally weak, nontoxic, and in no distress   Head:  Normocephalic, without obvious abnormality, atraumatic   Eyes:  Conjunctiva pink and sclera anicteric, both eyes   Nose: Nares normal, mucosa normal, no drainage   Throat: Oropharynx moist without lesions   Neck: Supple, symmetrical, no adenopathy, no tenderness/mass/nodules   Lungs:   Poor inspiratory effort, shallow breathing notes with little respiratory reserve; diminished, tachypneic on 12L [address]  Mild conversational dyspnea    Chest Wall:  No tenderness or deformity   Heart:  RRR; no murmur, rub or gallop   Abdomen:   Soft, non-tender, non-distended, positive bowel sounds    Extremities: No cyanosis, clubbing or edema   Skin: No rashes or lesions  No draining wounds noted  Lymph nodes: Cervical, supraclavicular nodes normal   : No CVA or suprapubic tenderness  Neurologic: Alert and oriented times 3, extremity strength 5/5 and symmetric       LABS, IMAGING, & OTHER STUDIES:  Extensive review of the medical records in epic including review of the notes, radiographs, and laboratory results were reviewed personally as below  Lab Results:  I have personally reviewed pertinent labs  Results from last 7 days   Lab Units 05/02/23  0501 04/30/23  1539   WBC Thousand/uL 18 07* 17 87*   HEMOGLOBIN g/dL 10 6* 11 9*   PLATELETS Thousands/uL 327 378     Results from last 7 days   Lab Units 05/02/23  0501 04/30/23  1539   SODIUM mmol/L 134* 135   POTASSIUM mmol/L 3 6 3 0*   CHLORIDE mmol/L 101 101   CO2 mmol/L 26 24   BUN mg/dL 26* 31*   CREATININE mg/dL 1 19 1 25   EGFR ml/min/1 73sq m 61 58   CALCIUM mg/dL 8 2* 8 6   AST U/L 25 38   ALT U/L 23 39   ALK PHOS U/L 53 67     Results from last 7 days   Lab Units 05/01/23  0011 04/30/23  2326   BLOOD CULTURE   --  No Growth at 24 hrs  No Growth at 24 hrs  LEGIONELLA URINARY ANTIGEN  Negative  --      Results from last 7 days   Lab Units 05/02/23  0501 05/01/23  0520 04/30/23  1539   PROCALCITONIN ng/ml 0 73* 0 59* 0 28*     Results from last 7 days   Lab Units 04/30/23  1539   CRP mg/L 121 6*               Imaging Studies:   I have personally reviewed pertinent imaging study reports and images in PACS, including CXR, CTA chest     Other Studies:   I have personally reviewed pertinent report including blood cultures      I have spent a total time of 80  minutes on 05/02/23 in caring for this patient including Diagnostic results, Impressions, Counseling / Coordination of care, Documenting in the medical record, Reviewing / ordering tests, medicine, procedures  , Obtaining or reviewing history   and Communicating with other healthcare professionals

## 2023-05-02 NOTE — ASSESSMENT & PLAN NOTE
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

## 2023-05-02 NOTE — PLAN OF CARE
Problem: Potential for Falls  Goal: Patient will remain free of falls  Description: INTERVENTIONS:  - Educate patient/family on patient safety including physical limitations  - Instruct patient to call for assistance with activity   - Consult OT/PT to assist with strengthening/mobility   - Keep Call bell within reach  - Keep bed low and locked with side rails adjusted as appropriate  - Keep care items and personal belongings within reach  - Initiate and maintain comfort rounds  - Make Fall Risk Sign visible to staff  - Offer Toileting every 2 Hours, in advance of need  - Initiate/Maintain bed alarm  - Obtain necessary fall risk management equipment:   - Apply yellow socks and bracelet for high fall risk patients  - Consider moving patient to room near nurses station  Outcome: Progressing     Problem: MOBILITY - ADULT  Goal: Maintain or return to baseline ADL function  Description: INTERVENTIONS:  -  Assess patient's ability to carry out ADLs; assess patient's baseline for ADL function and identify physical deficits which impact ability to perform ADLs (bathing, care of mouth/teeth, toileting, grooming, dressing, etc )  - Assess/evaluate cause of self-care deficits   - Assess range of motion  - Assess patient's mobility; develop plan if impaired  - Assess patient's need for assistive devices and provide as appropriate  - Encourage maximum independence but intervene and supervise when necessary  - Involve family in performance of ADLs  - Assess for home care needs following discharge   - Consider OT consult to assist with ADL evaluation and planning for discharge  - Provide patient education as appropriate  Outcome: Progressing  Goal: Maintains/Returns to pre admission functional level  Description: INTERVENTIONS:  - Perform BMAT or MOVE assessment daily    - Set and communicate daily mobility goal to care team and patient/family/caregiver     - Collaborate with rehabilitation services on mobility goals if consulted  - Perform Range of Motion 3 times a day  - Reposition patient every 2 hours    - Dangle patient 3 times a day  - Stand patient 3 times a day  - Ambulate patient 3 times a day  - Out of bed to chair 3 times a day   - Out of bed for meals 3 times a day  - Out of bed for toileting  - Record patient progress and toleration of activity level   Outcome: Progressing     Problem: INFECTION - ADULT  Goal: Absence or prevention of progression during hospitalization  Description: INTERVENTIONS:  - Assess and monitor for signs and symptoms of infection  - Monitor lab/diagnostic results  - Monitor all insertion sites, i e  indwelling lines, tubes, and drains  - Monitor endotracheal if appropriate and nasal secretions for changes in amount and color  - [place] appropriate cooling/warming therapies per order  - Administer medications as ordered  - Instruct and encourage patient and family to use good hand hygiene technique  - Identify and instruct in appropriate isolation precautions for identified infection/condition  Outcome: Progressing  Goal: Absence of fever/infection during neutropenic period  Description: INTERVENTIONS:  - Monitor WBC    Outcome: Progressing     Problem: SAFETY ADULT  Goal: Patient will remain free of falls  Description: INTERVENTIONS:  - Educate patient/family on patient safety including physical limitations  - Instruct patient to call for assistance with activity   - Consult OT/PT to assist with strengthening/mobility   - Keep Call bell within reach  - Keep bed low and locked with side rails adjusted as appropriate  - Keep care items and personal belongings within reach  - Initiate and maintain comfort rounds  - Make Fall Risk Sign visible to staff  - Offer Toileting every 2 Hours, in advance of need  - Initiate/Maintain bed alarm  - Obtain necessary fall risk management equipment:   - Apply yellow socks and bracelet for high fall risk patients  - Consider moving patient to room near nurses station  Outcome: Progressing  Goal: Maintain or return to baseline ADL function  Description: INTERVENTIONS:  -  Assess patient's ability to carry out ADLs; assess patient's baseline for ADL function and identify physical deficits which impact ability to perform ADLs (bathing, care of mouth/teeth, toileting, grooming, dressing, etc )  - Assess/evaluate cause of self-care deficits   - Assess range of motion  - Assess patient's mobility; develop plan if impaired  - Assess patient's need for assistive devices and provide as appropriate  - Encourage maximum independence but intervene and supervise when necessary  - Involve family in performance of ADLs  - Assess for home care needs following discharge   - Consider OT consult to assist with ADL evaluation and planning for discharge  - Provide patient education as appropriate  Outcome: Progressing  Goal: Maintains/Returns to pre admission functional level  Description: INTERVENTIONS:  - Perform BMAT or MOVE assessment daily    - Set and communicate daily mobility goal to care team and patient/family/caregiver  - Collaborate with rehabilitation services on mobility goals if consulted  - Perform Range of Motion 3 times a day  - Reposition patient every 2 hours    - Dangle patient 3 times a day  - Stand patient 3 times a day  - Ambulate patient 3 times a day  - Out of bed to chair 3 times a day   - Out of bed for meals 3 times a day  - Out of bed for toileting  - Record patient progress and toleration of activity level   Outcome: Progressing

## 2023-05-02 NOTE — ANESTHESIA POSTPROCEDURE EVALUATION
"Post-Op Assessment Note    CV Status:  Stable    Pain management: adequate     Mental Status:  Alert and awake   Hydration Status:  Euvolemic   PONV Controlled:  Controlled   Airway Patency:  Patent  Airway: intubated      Post Op Vitals Reviewed: Yes      Staff: Anesthesiologist, CRNA   Comments: Extubated in procedure room, eyes open, sustained headlift  To stepdown for observation/BiPAP  Dr Javier Almonte attending  No notable events documented      BP      Temp      Pulse     Resp      SpO2      /72   Pulse 90   Temp 99 8 °F (37 7 °C) (Temporal)   Resp 16   Ht 5' 8\" (1 727 m)   Wt 91 7 kg (202 lb 2 6 oz)   SpO2 94%   BMI 30 74 kg/m²     "

## 2023-05-02 NOTE — PHYSICAL THERAPY NOTE
PHYSICAL THERAPY NOTE          Patient Name: George Nicholas  [id number] Date: 5/2/2023 05/02/23 1017   PT Last Visit   PT Visit Date 05/02/23   Note Type   Note type Cancelled Session   Cancel Reasons Refusal   Additional Comments Requesting to hold PT evaluation after bronchoscopy completed  Will follow and complete evaluation after procedure as appropriate       Lauren Devi, PT

## 2023-05-03 PROBLEM — B59 PNEUMOCYSTIS CARINII PNEUMONIA (HCC): Status: ACTIVE | Noted: 2023-05-03

## 2023-05-03 NOTE — ASSESSMENT & PLAN NOTE
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

## 2023-05-03 NOTE — PROGRESS NOTES
Progress Note - [facility]   George Nicholas 70 y o  male MRN: [id number]  Unit/Bed#: ICU 07 Encounter: [id number]      IMPRESSION & RECOMMENDATIONS:   1  New bilateral GGO  CXR and CT chest show new bilateral GGO  Consider infectious vs inflammatory etiologies in this chronically immunosuppressed patient  RP2 negative and COVID19 negative, urinary Ag negative  Sputum cx pending  PCT mildly elevated though likely low yield due to underlying malignancy  Low suspicion for fungal etiology given lack of cavitary lesions on imaging and nodules likely represent metastatic dz  Low suspicion for bacterial etiology  MRSA nares negative  Unlikely aspergillosis without underlying parenchymal lung disease and absence of neutropenia  Consider possibility of opportunistic infection like pneumocystis  Patient lived in [place] for 15 years, thus consider possibility of TB, though no other RF for such  Consider autoimmune or drug related pneumonitis, though patient has not had recent chemo and last dose of Rituxan was several years ago  Pulmonology following and he is s/p bronchoscopy for BAL yesterday with evidence of cloudy secretions    -continue IV cefepime, will increase dose   -follow up BAL cx including legionella, AFB, PCP DFA, histoplasma Ag and aspergillus Ag, TB PCR  -follow up BAL cytology   -follow up serum 1,3-Beta-D-Glucan and strongyloides IgG  -follow-up sputum culture if able   -follow-up blood cultures   -monitor CBCD, temperature and hemodynamics  -TB airborne isolation not required      2  Acute respiratory failure with hypoxia  This is presumed secondary to #1  Patient continues to have tenuous respiratory status  He is requiring HFNC in ICU at this interval     -work up as above  -close pulmonology follow up  -continue respiratory protocol and pulmonary toileting      3  Leukocytosis  Suspect some component of steroid induced leukocytosis  Blood cultures are negative >48 hours   He is afebrile  He is hemodynamically stable    -follow up blood cultures  -monitor CBCd, temperature and hemodynamics       4  Hx of malignant melanoma, mets to brain, lung, liver, LN, bone  S/p whole brain radiation  Briefly started on treatment with encorafenib/binimetinib and was supposed to restart on 4/3 but now on hold due to recent acute illness  Follows with outpatient heme/onc, Dr Estuardo Guan   -close onc follow up      5  Chronic diarrhea  Known hx of C diff in the past  Recent admission for diverticulitis  CT A/P negative  C  Diff PCR positive with negative EIA  Likely colonized  -continue PPX dosing PO vanco while on systemic abx      6  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, 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  Follows outpatient heme/onc  Previously treated with Rituxan and recently restarted on dexamethasone for #3  Follows Dr Cheryl Pabon    -monitor CBC     7  Hx PE on Pradaxa  CTA chest negative for VTE  Antibiotics:  IV cefepime     I have discussed the above management plan in detail with patient  I have discussed the above management plan in detail with patient's RN, and the primary service, [facility]      24 Hour events:  Yesterday and overnight notes reviewed  S/p bronch and transferred to ICU  Subjective:  Patient has no fever, chills, sweats; no nausea, vomiting, diarrhea; no cough, shortness of breath; no pain  No new symptoms  Objective:  Vitals:  Temp:  [97 3 °F (36 3 °C)-99 8 °F (37 7 °C)] 98 2 °F (36 8 °C)  HR:  [] 78  Resp:  [16-38] 21  BP: (108-136)/(61-88) 134/69  SpO2:  [91 %-100 %] 95 %  Temp (24hrs), Av 6 °F (37 °C), Min:97 3 °F (36 3 °C), Max:99 8 °F (37 7 °C)  Current: Temperature: 98 2 °F (36 8 °C)    PHYSICAL EXAM:  General Appearance:  Appearing chronically ill and globally weak, nontoxic, and in no distress   HEENT: Normocephalic, without obvious abnormality, atraumatic  Conjunctiva pink and sclera anicteric  Oropharynx moist without lesions       Lungs:   Poor inspiratory effort, shallow breathing noted with poor respiratory reserve though seems improved from yesterday now on HFNC   Heart:  RRR; no murmur, rub or gallop   Abdomen:   Soft, non-tender, non-distended, positive bowel sounds    Extremities: No cyanosis, clubbing or edema   Skin: No rashes or lesions  No draining wounds noted  Peripheral IV intact without evidence of erythema, warmth, or exudate  LABS, IMAGING, & OTHER STUDIES:  Extensive review of the medical records in epic including review of the notes, radiographs, and laboratory results were reviewed personally as below  Lab Results:  I have personally reviewed pertinent labs  Results from last 7 days   Lab Units 05/03/23  0530 05/02/23  0501 04/30/23  1539   WBC Thousand/uL 22 20* 18 07* 17 87*   HEMOGLOBIN g/dL 9 0* 10 6* 11 9*   PLATELETS Thousands/uL 358 327 378     Results from last 7 days   Lab Units 05/03/23  0530 05/02/23  0501 04/30/23  1539   SODIUM mmol/L 134* 134* 135   POTASSIUM mmol/L 3 8 3 6 3 0*   CHLORIDE mmol/L 100 101 101   CO2 mmol/L 27 26 24   BUN mg/dL 23 26* 31*   CREATININE mg/dL 1 08 1 19 1 25   EGFR ml/min/1 73sq m 69 61 58   CALCIUM mg/dL 8 5 8 2* 8 6   AST U/L 27 25 38   ALT U/L 20 23 39   ALK PHOS U/L 52 53 67     Results from last 7 days   Lab Units 05/01/23  0946 05/01/23  0011 05/01/23  0010 04/30/23  2326   BLOOD CULTURE   --   --   --  No Growth at 48 hrs  No Growth at 48 hrs  MRSA CULTURE ONLY   --   --  No Methicillin Resistant Staphlyococcus aureus (MRSA) isolated  --    LEGIONELLA URINARY ANTIGEN   --  Negative  --   --    C DIFF TOXIN B BY PCR  Positive*  --   --   --      Results from last 7 days   Lab Units 05/02/23  0501 05/01/23  0520 04/30/23  1539   PROCALCITONIN ng/ml 0 73* 0 59* 0 28*     Results from last 7 days   Lab Units 05/03/23  0530 04/30/23  1539   CRP mg/L 276 9* 121 6*               Imaging Studies:   I have personally reviewed pertinent imaging study reports and images in PACS      Other Studies: I have personally reviewed pertinent reports

## 2023-05-03 NOTE — PROGRESS NOTES
[address] [place] [address]  Progress Note  Name: Jorge Luis Swain  MRN: [id number]  Unit/Bed#: ICU 07 I Date of Admission: 4/30/2023   Date of Service: 5/3/2023 I Hospital Day: 3    Assessment/Plan   * Acute respiratory failure with hypoxia [facility])  Assessment & Plan  Presents with nonproductive cough, chest tightness, shortness of breath, generalized weakness  Recent admission for diverticulitis  On arrival:   • VS: afebrile, normotensive, tachycardia, tachypnea, new o2 on 3L progressed to midflow  • Labs: CBC chronic leukocytosis, BMP hypokalemia/hypomagnesemia, lactate 2 4>1 1, BNP 26, trop wnl   • Imaging: CTA C/A/P: no PE, new bilateral GGO, scattered pulmonary/liver/inguinal nodules, osseous metastasis   • Initially on 3LNC, progressed to midflow on floor, monitor for progression to HFNC/Bipap  Lungs sound clear without wheezing/crackles  Does not examine volume overloaded  5/1:  Increased O2 requirement this AM, desat to 70s  Improved with 15L NC  Bronchoscopy performed and patient transferred to the ICU for BiPAP, alternating FiO2 down now on HFNC  Plan:   • Continue supplemental O2, maintain O2 sat> 92%  Currently on 70% on 40L, wean as able  • ABX: Day#3  Abx broadened to Cefepime, Azithyromycin, and Vancomycin on 5/1  • ID consulted and discontinued azithromycin and vancomycin  • Pneumocystis, histoplasma, aspergillus, fungitell assay pending  F/u results  • Diet: regular  • Lines/Drains/Tubes: peripheral IV  • S/P BAL; follow-up with results      Ground glass opacity present on imaging of lung  Assessment & Plan  New bilateral lung ground glass opacities/infiltrates  Past smoking history  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, 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   Denies history of pulmonary disease     Urine legionella/strep negative  Procal 0 28->0 59  Diffdx: interstitial pneumonia, pneumonitis, ILD, metastatic disease        Plan:  • Afebrile, chronic leukocytosis in setting of steroids, viral panel negative   • Check procal/CRP although unreliable in malignancy   • Check sputum G/S & Cx, RP2  • Pulmonary hygiene with IS, duoneb, mucinex, tessalon   • Pulmonology consulted; transferred to ICU  • F/U with BAL results       Personal history of malignant melanoma  Assessment & Plan  Known malignant melanoma, completion of whole brain radiation, briefly started on treatment with encorafenib/binimetinib  Follows with outpatient heme/onc, Dr Jozef Torres  Pending follow-up radiation therapy         Watery diarrhea  Assessment & Plan  Hx of diverticulitis, C  Diff in the past  Remains with intermittent episodes of watery diarrhea but improving today  Without diverticulitis on CT scan     Plan:  • Prior C  Diff PCR positive but toxins negative, probable colonization  • C  Diff ordered, positive toxin on PCR in the setting of prior history of C  Dif  • Monitor with use of antibiotics   • Started on C  Diff prophylaxis with oral vancomycin 125 mg BID     Autoimmune hemolytic anemia  Assessment & Plan  History of warm antibody hemolytic anemia, 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, monitored by outpatient Heme/onc     Plan:  • Previously treated with Rituxan   • Restarted on dexamethasone, will continue   • Trend H/H, Bili       History of pulmonary embolism  Assessment & Plan  No acute PE noted this admission, maintained on Pradaxa         ICU Core Measures     A: Assess, Prevent, and Manage Pain · Has pain been assessed? Yes  · Need for changes to pain regimen? No   B: Both SAT/SAT  · N/A   C: Choice of Sedation · RASS Goal: 0 Alert and Calm  · Need for changes to sedation or analgesia regimen? NA   D: Delirium · CAM-ICU: Negative   E: Early Mobility  · Plan for early mobility? Yes   F: Family Engagement · Plan for family engagement today? Yes       Antibiotic Review: Patient on appropriate coverage based on culture data  , Continue broad spectrum secondary to severity of illness    and Infectious disease consulted      Prophylaxis:  VTE VTE covered by:  dabigatran etexilate, Oral, 150 mg at 05/02/23 2151       Stress Ulcer  covered bypantoprazole (PROTONIX) EC tablet 40 mg [[id number]]       Subjective   HPI/24hr events: Overnight, episode of SVT noted with conversion back to NSR after Metoprolol 5 mg twice (HR 80s)  Patient currently on maximum high flow settings  Review of Systems   Respiratory: Negative for chest tightness, shortness of breath and wheezing  Cardiovascular: Negative for chest pain, palpitations and leg swelling  All other systems reviewed and are negative  Objective                            Vitals I/O      Most Recent Min/Max in 24hrs   Temp 98 2 °F (36 8 °C) Temp  Min: 97 1 °F (36 2 °C)  Max: 99 8 °F (37 7 °C)   Pulse 78 Pulse  Min: 72  Max: 160   Resp 21 Resp  Min: 16  Max: 38   /69 BP  Min: 108/69  Max: 136/82   O2 Sat 95 % SpO2  Min: 91 %  Max: 100 %      Intake/Output Summary (Last 24 hours) at 5/3/2023 0710  Last data filed at 5/3/2023 0600  Gross per 24 hour   Intake 1000 ml   Output 1150 ml   Net -150 ml         Diet Regular; Regular House     Invasive Monitoring Physical exam    Physical Exam  Constitutional:       Appearance: He is not ill-appearing or toxic-appearing  HENT:      Head: Normocephalic and atraumatic  Nose: Nose normal       Mouth/Throat:      Mouth: Mucous membranes are moist    Cardiovascular:      Rate and Rhythm: Normal rate and regular rhythm  Pulses: Normal pulses  Heart sounds: Normal heart sounds  No murmur heard  No friction rub  Pulmonary:      Effort: Pulmonary effort is normal  No respiratory distress  Breath sounds: Normal breath sounds  Abdominal:      General: Abdomen is flat  Palpations: There is no mass  Musculoskeletal:         General: Normal range of motion  Cervical back: Normal range of motion  Right lower leg: No edema  Left lower leg: No edema  Neurological:      Mental Status: He is alert  Diagnostic Studies      PE Study with CT Abdomen and Pelvis with contrast   Final Result by Noah Patricia MD (04/30 2027)   1  No pulmonary artery emboli  2   New bilateral lung groundglass opacities and infiltrates, likely inflammatory/infectious  3  Scattered small lung nodules again visualized, suspicious for metastases  4  Scattered liver lesions again visualized, most concerning for metastases  5  Multiple intra-abdominal and right inguinal nodules, likely metastatic  6  Known osseous metastases better demonstrated on prior PET/CT  Workstation performed: [id number]         XR chest 1 view portable   Final Result by Venessa Salgado MD (05/01 0846)      Patchy groundglass infiltrates bilaterally, worse from the prior exam  This could be related to atypical pneumonia versus edema           Workstation performed: [id number]                Medications:  Scheduled PRN   cefepime, 2,000 mg, Q12H  dabigatran etexilate, 150 mg, Q12H RAVIN  dexamethasone, 4 mg, Q8H  hydrochlorothiazide, 25 mg, Daily  memantine, 10 mg, BID  metoprolol succinate, 12 5 mg, Daily  pantoprazole, 40 mg, Early Morning  prazosin, 1 mg, HS  vancomycin oral, 125 mg, Q12H RAVIN      acetaminophen, 650 mg, Q6H PRN  albuterol, 2 puff, Q4H PRN  aluminum-magnesium hydroxide-simethicone, 30 mL, Q6H PRN  benzonatate, 100 mg, TID PRN  dextromethorphan-guaiFENesin, 10 mL, Q4H PRN  ondansetron, 4 mg, Q6H PRN  polyethylene glycol, 17 g, Daily PRN       Continuous          Labs:    CBC    Recent Labs     05/02/23  0501 05/03/23  0530   WBC 18 07* 22 20*   HGB 10 6* 9 0*   HCT 32 0* 27 8*    358     BMP    Recent Labs     05/02/23  0501   SODIUM 134*   K 3 6      CO2 26   AGAP 7   BUN 26*   CREATININE 1 19   CALCIUM 8 2*       Coags    No recent results     Additional Electrolytes  No recent results       Blood Gas    No recent results  No recent results LFTs  Recent Labs     05/02/23  0501   ALT 23   AST 25 ALKPHOS 53   ALB 2 8*   TBILI 0 60       Infectious  Recent Labs     05/02/23  0501   PROCALCITONI 0 73*     Glucose  Recent Labs     05/02/23  0501   GLUC 149*               Disposition: Please see attending attestation       Rafia Officer, MD

## 2023-05-03 NOTE — ASSESSMENT & PLAN NOTE
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

## 2023-05-03 NOTE — ASSESSMENT & PLAN NOTE
Presents with nonproductive cough, chest tightness, shortness of breath, generalized weakness  Recent admission for diverticulitis  On arrival:   • VS: afebrile, normotensive, tachycardia, tachypnea, new o2 on 3L progressed to midflow  • Labs: CBC chronic leukocytosis, BMP hypokalemia/hypomagnesemia, lactate 2 4>1 1, BNP 26, trop wnl   • Imaging: CTA C/A/P: no PE, new bilateral GGO, scattered pulmonary/liver/inguinal nodules, osseous metastasis   • Initially on 3LNC, progressed to midflow on floor, monitor for progression to HFNC/Bipap  Lungs sound clear without wheezing/crackles  Does not examine volume overloaded  5/1:  Increased O2 requirement this AM, desat to 70s  Improved with 15L NC  Bronchoscopy performed and patient transferred to the ICU for BiPAP, alternating FiO2 down now on HFNC  Plan:   • Continue supplemental O2, maintain O2 sat> 92%  Currently on 13L NC, wean as able  • ABX: Day#3  Abx broadened to Cefepime, Azithyromycin, and Vancomycin on 5/1  • ID consulted and discontinued azithromycin and vancomycin  • Pneumocystis, histoplasma, aspergillus, fungitell assay pending   F/u results  • Diet: regular  • Lines/Drains/Tubes: peripheral IV  • S/P BAL; follow-up with results

## 2023-05-03 NOTE — PLAN OF CARE
Problem: Potential for Falls  Goal: Patient will remain free of falls  Description: INTERVENTIONS:  - Educate patient/family on patient safety including physical limitations  - Instruct patient to call for assistance with activity   - Consult OT/PT to assist with strengthening/mobility   - Keep Call bell within reach  - Keep bed low and locked with side rails adjusted as appropriate  - Keep care items and personal belongings within reach  - Initiate and maintain comfort rounds  - Make Fall Risk Sign visible to staff  - Offer Toileting in advance of need  - Initiate/Maintain bed alarm  - Obtain necessary fall risk management equipment  - Apply yellow socks and bracelet for high fall risk patients  - Consider moving patient to room near nurses station  Outcome: Progressing     Problem: MOBILITY - ADULT  Goal: Maintain or return to baseline ADL function  Description: INTERVENTIONS:  -  Assess patient's ability to carry out ADLs; assess patient's baseline for ADL function and identify physical deficits which impact ability to perform ADLs (bathing, care of mouth/teeth, toileting, grooming, dressing, etc )  - Assess/evaluate cause of self-care deficits   - Assess range of motion  - Assess patient's mobility; develop plan if impaired  - Assess patient's need for assistive devices and provide as appropriate  - Encourage maximum independence but intervene and supervise when necessary  - Involve family in performance of ADLs  - Assess for home care needs following discharge   - Consider OT consult to assist with ADL evaluation and planning for discharge  - Provide patient education as appropriate  Outcome: Progressing  Goal: Maintains/Returns to pre admission functional level  Description: INTERVENTIONS:  - Perform BMAT or MOVE assessment daily    - Set and communicate daily mobility goal to care team and patient/family/caregiver     - Collaborate with rehabilitation services on mobility goals if consulted  - Out of bed for toileting  - Record patient progress and toleration of activity level   Outcome: Progressing     Problem: INFECTION - ADULT  Goal: Absence or prevention of progression during hospitalization  Description: INTERVENTIONS:  - Assess and monitor for signs and symptoms of infection  - Monitor lab/diagnostic results  - Monitor all insertion sites, i e  indwelling lines, tubes, and drains  - Monitor endotracheal if appropriate and nasal secretions for changes in amount and color  - Superior appropriate cooling/warming therapies per order  - Administer medications as ordered  - Instruct and encourage patient and family to use good hand hygiene technique  - Identify and instruct in appropriate isolation precautions for identified infection/condition  Outcome: Progressing  Goal: Absence of fever/infection during neutropenic period  Description: INTERVENTIONS:  - Monitor WBC    Outcome: Progressing     Problem: SAFETY ADULT  Goal: Patient will remain free of falls  Description: INTERVENTIONS:  - Educate patient/family on patient safety including physical limitations  - Instruct patient to call for assistance with activity   - Consult OT/PT to assist with strengthening/mobility   - Keep Call bell within reach  - Keep bed low and locked with side rails adjusted as appropriate  - Keep care items and personal belongings within reach  - Initiate and maintain comfort rounds  - Make Fall Risk Sign visible to staff  - Offer Toileting in advance of need  - Initiate/Maintain bed alarm  - Obtain necessary fall risk management equipment  - Apply yellow socks and bracelet for high fall risk patients  - Consider moving patient to room near nurses station  Outcome: Progressing  Goal: Maintain or return to baseline ADL function  Description: INTERVENTIONS:  -  Assess patient's ability to carry out ADLs; assess patient's baseline for ADL function and identify physical deficits which impact ability to perform ADLs (bathing, care of mouth/teeth, toileting, grooming, dressing, etc )  - Assess/evaluate cause of self-care deficits   - Assess range of motion  - Assess patient's mobility; develop plan if impaired  - Assess patient's need for assistive devices and provide as appropriate  - Encourage maximum independence but intervene and supervise when necessary  - Involve family in performance of ADLs  - Assess for home care needs following discharge   - Consider OT consult to assist with ADL evaluation and planning for discharge  - Provide patient education as appropriate  Outcome: Progressing  Goal: Maintains/Returns to pre admission functional level  Description: INTERVENTIONS:  - Perform BMAT or MOVE assessment daily    - Set and communicate daily mobility goal to care team and patient/family/caregiver     - Collaborate with rehabilitation services on mobility goals if consulted  - Out of bed for toileting  - Record patient progress and toleration of activity level   Outcome: Progressing     Problem: CARDIOVASCULAR - ADULT  Goal: Maintains optimal cardiac output and hemodynamic stability  Description: INTERVENTIONS:  - Monitor I/O, vital signs and rhythm  - Monitor for S/S and trends of decreased cardiac output  - Administer and titrate ordered vasoactive medications to optimize hemodynamic stability  - Assess quality of pulses, skin color and temperature  - Assess for signs of decreased coronary artery perfusion  - Instruct patient to report change in severity of symptoms  Outcome: Progressing  Goal: Absence of cardiac dysrhythmias or at baseline rhythm  Description: INTERVENTIONS:  - Continuous cardiac monitoring, vital signs, obtain 12 lead EKG if ordered  - Administer antiarrhythmic and heart rate control medications as ordered  - Monitor electrolytes and administer replacement therapy as ordered  Outcome: Progressing     Problem: RESPIRATORY - ADULT  Goal: Achieves optimal ventilation and oxygenation  Description: INTERVENTIONS:  - Assess for changes in respiratory status  - Assess for changes in mentation and behavior  - Position to facilitate oxygenation and minimize respiratory effort  - Oxygen administered by appropriate delivery if ordered  - Initiate smoking cessation education as indicated  - Encourage broncho-pulmonary hygiene including cough, deep breathe, Incentive Spirometry  - Assess the need for suctioning and aspirate as needed  - Assess and instruct to report SOB or any respiratory difficulty  - Respiratory Therapy support as indicated  Outcome: Progressing     Problem: GASTROINTESTINAL - ADULT  Goal: Minimal or absence of nausea and/or vomiting  Description: INTERVENTIONS:  - Administer IV fluids if ordered to ensure adequate hydration  - Maintain NPO status until nausea and vomiting are resolved  - Nasogastric tube if ordered  - Administer ordered antiemetic medications as needed  - Provide nonpharmacologic comfort measures as appropriate  - Advance diet as tolerated, if ordered  - Consider nutrition services referral to assist patient with adequate nutrition and appropriate food choices  Outcome: Progressing     Problem: METABOLIC, FLUID AND ELECTROLYTES - ADULT  Goal: Electrolytes maintained within normal limits  Description: INTERVENTIONS:  - Monitor labs and assess patient for signs and symptoms of electrolyte imbalances  - Administer electrolyte replacement as ordered  - Monitor response to electrolyte replacements, including repeat lab results as appropriate  - Instruct patient on fluid and nutrition as appropriate  Outcome: Progressing  Goal: Fluid balance maintained  Description: INTERVENTIONS:  - Monitor labs   - Monitor I/O and WT  - Instruct patient on fluid and nutrition as appropriate  - Assess for signs & symptoms of volume excess or deficit  Outcome: Progressing  Goal: Glucose maintained within target range  Description: INTERVENTIONS:  - Monitor Blood Glucose as ordered  - Assess for signs and symptoms of hyperglycemia and hypoglycemia  - Administer ordered medications to maintain glucose within target range  - Assess nutritional intake and initiate nutrition service referral as needed  Outcome: Progressing     Problem: Prexisting or High Potential for Compromised Skin Integrity  Goal: Skin integrity is maintained or improved  Description: INTERVENTIONS:  - Identify patients at risk for skin breakdown  - Assess and monitor skin integrity  - Assess and monitor nutrition and hydration status  - Monitor labs   - Assess for incontinence   - Turn and reposition patient  - Assist with mobility/ambulation  - Relieve pressure over bony prominences  - Avoid friction and shearing  - Provide appropriate hygiene as needed including keeping skin clean and dry  - Evaluate need for skin moisturizer/barrier cream  - Collaborate with interdisciplinary team   - Patient/family teaching  - Consider wound care consult   Outcome: Progressing

## 2023-05-03 NOTE — PHYSICAL THERAPY NOTE
PT EVALUATION 10;54-11:09    70 y o     [id number]    Weakness [R53 1]  Hypoxia [R09 02]  Generalized weakness [R53 1]    Past Medical History:   Diagnosis Date    Acute diverticulitis 4/12/2023    ARABELLA (acute kidney injury) ([facility] [address] ) 3/29/2018    Anemia 11/02/2016    Anxiety     Arthritis     Autoimmune hemolytic anemia ([facility] [address] )     Claustrophobia     COVID 12/2020    DVT (deep venous thrombosis) ([facility])     GERD (gastroesophageal reflux disease)     Hearing loss, right     Hemolytic anemia ([facility] [address] )     History of transfusion     2018 - no adverse reaction    Hypertension     Malignant melanoma of leg, right ([facility] [address] ) 07/01/2022    Palpitation     Portal vein thrombosis     PTSD (post-traumatic stress disorder)     Pulmonary emboli (HCC)     Squamous cell skin cancer 12/20/2022    SCCIS- 12/6/22    Tobacco abuse          Past Surgical History:   Procedure Laterality Date    CATARACT EXTRACTION Bilateral     CHOLECYSTECTOMY  07/18/2017    COLONOSCOPY      ELBOW ARTHROPLASTY Left     bursectomy    IR BIOPSY RETROPERITONEUM  3/17/2023    IR DRAINAGE TUBE CHECK WITH SCLEROSIS  10/06/2022    IR DRAINAGE TUBE CHECK WITH SCLEROSIS  10/10/2022    IR DRAINAGE TUBE CHECK WITH SCLEROSIS  10/20/2022    IR DRAINAGE TUBE CHECK WITH SCLEROSIS  10/27/2022    IR DRAINAGE TUBE CHECK WITH SCLEROSIS  11/04/2022    IR DRAINAGE TUBE CHECK WITH SCLEROSIS  11/15/2022    IR DRAINAGE TUBE CHECK WITH SCLEROSIS  11/25/2022    IR DRAINAGE TUBE PLACEMENT  09/19/2022    JOINT REPLACEMENT Right 02/02/2021    knee    KNEE SURGERY Right     meniscus tear    LYMPH NODE BIOPSY Right 07/29/2022    Procedure: BIOPSY LYMPH NODE SENTINEL;  Surgeon: Emma Oswald MD;  [facility];  Service: Surgical Oncology    MOHS SURGERY Right 12/20/2022    Right dorsal hand Shawn Loera    CA ARTHRP KNE CONDYLE&PLATU MEDIAL&LAT COMPARTMENTS Right 02/02/2021    Procedure: ARTHROPLASTY KNEE TOTAL;  Surgeon: Gabby Mcconnell MD;  [facility];  Service: Orthopedics ND ARTHRP KNE CONDYLE&PLATU MEDIAL&LAT COMPARTMENTS Left 11/17/2021    Procedure: TOTAL KNEE REPLACEMENT;  Surgeon: Wally Prather MD;  [facility];  Service: Orthopedics    ND LAPS SURG CHOLECYSTECTOMY Amy Oh N/A 12/23/2017    Procedure: CHOLECYSTECTOMY LAPAROSCOPIC with cholangiogram;  Surgeon: Job Jeter MD;  [facility];  Service: General    ND SPLENECTOMY TOTAL SEPARATE PROCEDURE N/A 05/18/2017    Procedure: LAPAROSCOPIC HAND ASSIST SPLENECTOMY;  Surgeon: Tobias Avendano MD;  [facility];  Service: Surgical Oncology    SHOULDER SURGERY Left     rotator cuff x4, reconstruction    SKIN BIOPSY  5 May 2022    SKIN CANCER EXCISION  29 July 2022    SKIN LESION EXCISION Right 07/29/2022    Procedure: WIDE EXCISION RIGHT MEDIAL THIGH;  Surgeon: Paula Villegas MD;  [facility];  Service: Surgical Oncology      05/03/23 1054   PT Last Visit   PT Visit Date 05/03/23   Note Type   Note type Evaluation   Pain Assessment   Pain Score No Pain   Restrictions/Precautions   Other Precautions Contact/isolation; Airborne/isolation;Multiple lines;Telemetry;O2;Fall Risk  (high flow O2)   Home Living   Type of Home House   Home Layout Two level;Bed/bath upstairs;1/2 bath on main level;Stairs to enter with rails  (5 SIDRA  flight to B/B)   Bathroom Shower/Tub Walk-in shower   Bathroom Toilet Standard   Bathroom Equipment Shower chair;Grab bars around toilet   Bathroom Accessibility Accessible   Home Equipment Walker;Cane   Additional Comments resides with spouse in 2 story home   Prior Function   Level of [place] Independent with ADLs; Independent with functional mobility; Independent with IADLS   Lives With Spouse   Receives Help From Family   IADLs Independent with driving; Independent with meal prep; Independent with medication management   Falls in the last 6 months 0   Comments Prior to admission typically independent without AD use    Week leading to admission requiring use of RW support for ambulation  General   Additional Pertinent History Pt is 71 y/o male admitted with resp failure, increased O2 need  Family/Caregiver Present No   Cognition   Overall Cognitive Status WFL   Arousal/Participation Alert   Attention Within functional limits   Orientation Level Oriented X4   Following Commands Follows one step commands without difficulty   Subjective   Subjective Wants to move but reluctant given increased recovery time with little mobility and SOB associated with same  RUE Assessment   RUE Assessment WFL   LUE Assessment   LUE Assessment WFL   RLE Assessment   RLE Assessment WFL   Strength RLE   RLE Overall Strength 3+/5   LLE Assessment   LLE Assessment WFL   Strength LLE   LLE Overall Strength 3+/5   Bed Mobility   Supine to Sit 3  Moderate assistance   Additional items Assist x 2; Increased time required;Verbal cues;HOB elevated; Bedrails   Sit to Supine 3  Moderate assistance   Additional items Assist x 2; Increased time required;LE management   Additional Comments /65  Supine 113/66  O2 sat 97% at rest on highflow   Transfers   Sit to Stand 3  Moderate assistance   Additional items Assist x 2; Increased time required;Verbal cues   Stand to Sit 3  Moderate assistance   Additional items Assist x 2; Increased time required   Additional Comments Increased time to complete transitions  requires EOB sitting prior to stand 2* LOWE  Ambulation/Elevation   Gait pattern Improper Weight shift;Decreased foot clearance; Short stride; Inconsistent svetlana; Excessively slow; Shuffling   Gait Assistance 3  Moderate assist   Additional items Assist x 2; Tactile cues; Verbal cues   Assistive Device Other (Comment)  (BUE hand held assist)   Distance 3 lateral steps toward HOB  Desaturates to 83%, recovers to 93% upon return to supine and repositioned     Balance   Static Sitting Fair   Dynamic Sitting Fair -   Static Standing Poor +   Dynamic Standing Poor   Ambulatory Poor   Endurance Deficit Endurance Deficit Yes   Endurance Deficit Description weakness, fatigue  Activity Tolerance   Activity Tolerance Patient limited by fatigue;Treatment limited secondary to medical complications (Comment)  (SOB, hypoxia to 83% with activity on highflow)   Medical Staff Made Aware Adair Lizarraga: Pt seen for co-evaluation/treatment with skilled Occupational Therapist 2* clinically unstable/unpredictable presentation, medical complexity, fall risk, functional/physical limitations, impaired functional balance, decreased safety awareness, limited activity tolerance which is decline from PLOF and may impact overall functional mobility/mobility safety  Nurse Made Aware yes   Assessment   Prognosis Fair   Problem List Decreased strength;Decreased endurance; Impaired balance;Decreased mobility; Decreased safety awareness   Assessment Vilma Holbrook is a 70 y o  male seen for PT evaluation following admission to [facility] on 4/30/2023 w/ Acute respiratory failure with hypoxia ([facility] [address] )   R/O COVID/TB  S/p bronchoscopy on 5/2  On airborne/contact precautions, r/o Cdiff  Comorbidities affecting pt’s functional performance include a significant PMH of cancer history and/or treatment and PE, HTN, arthritis, PTSD, COVID, mets to brain, hemolytic anemia, anxiety, DVT, GERD  Minerva Rice Pt with active PT orders and activity orders for Ambulate  Prior to admission pt resides with spouse in 2 story home  At baseline, pt was independent without AD until one week leading to admission requiring RW support 2* functional decline with events leading to admission  Currently presents with functional limitations related to impairments in decreased strength , decreased balance, decreased activity tolerance, gait deviations, limited functional reach, Fluctuating vitals, Hypoxia and decreased cardiovascular endurance   Upon evaluation, pt currently requires mod Ax2 for bed mobility, transfers, and ambulation with BUE hand held assist  Gait slowed, shuffling, short step  Limited activity tolerance   + LOWE  Desaturates to 83% on highflow, recovers to 93% upon return to supine  Increased recovery time noted  These impairments, as well at pt’s SIDRA home environment, steps within home environment, difficulty performing ADLs, difficulty performing IADLs, difficulty performing transfers/mobility, fall risk , functional decline , new O2 requirements, increase in O2 requirements  and increased reliance on DME  limit pt’s ability to safely perform functional mobility   Based on the PT evaluation, is high complexity evaluation 2* Ongoing medical status, Trending/abnormal lab values, Risk for falls, Imaging/test/procedure pending, Continuous monitoring, Telemetry, Decreased activity tolerance compared to baseline, Decline from PLOF , hypoxia, increased respiratory rate and new onset O2 use  Pt will continue to benefit from skilled PT services to address impairments and optimize outcomes  The patient's AM-PAC Basic Mobility Inpatient Short Form Raw Score is 10    A Raw score of less than or equal to 16 suggests the patient may benefit from discharge to post-acute rehabilitation services  Please also refer to the recommendation of the Physical Therapist for safe discharge planning  From PT standpoint, recommend short term rehab upon D/C  PT will continue to follow pt 3-5 x/wk to address impairments and optimize outcomes  Barriers to Discharge Inaccessible home environment   Barriers to Discharge Comments stairs   Goals   Patient Goals to rest   STG Expiration Date 05/13/23   Short Term Goal #1 10 days: 1)  Pt will perform bed mobility with Rafael demonstrating appropriate technique 100% of the time in order to improve function  2)  Perform all transfers with Rafael demonstrating safe and appropriate technique 100% of the time in order to improve ability to negotiate safely in home environment  3) Amb with least restrictive AD > 100'x1 with mod I in order to demonstrate ability to negotiate in home environment  4)  Improve overall strength and balance 1/2 grade in order to optimize ability to perform functional tasks and reduce fall risk  5) Increase activity tolerance to 30 minutes in order to improve endurance to functional tasks  6)  Negotiate stairs using most appropriate technique and Yoli in order to be able to negotiate safely in home environment  7) PT for ongoing patient and family/caregiver education, DME needs and d/c planning in order to promote highest level of function in least restrictive environment  Plan   Treatment/Interventions Functional transfer training;LE strengthening/ROM; Elevations; Therapeutic exercise; Endurance training;Patient/family training;Equipment eval/education; Bed mobility;Gait training; Compensatory technique education;Continued evaluation;Spoke to nursing;OT   PT Frequency 3-5x/wk   Recommendation   PT Discharge Recommendation Post acute rehabilitation services   Equipment Recommended Other (Comment)  (monitor needs with progress  )   AM-PAC Basic Mobility Inpatient   Turning in Flat Bed Without Bedrails 3   Lying on Back to Sitting on Edge of Flat Bed Without Bedrails 2   Moving Bed to Chair 1   Standing Up From Chair Using Arms 2   Walk in Room 1   Climb 3-5 Stairs With Railing 1   Basic Mobility Inpatient Raw Score 10   Turning Head Towards Sound 4   Follow Simple Instructions 4   Low Function Basic Mobility Raw Score  18   Low Function Basic Mobility Standardized Score  29 25   Highest Level Of Mobility   JH-HLM Goal 4: Move to chair/commode   JH-HLM Achieved 3: Sit at edge of bed   End of Consult   Patient Position at End of Consult Supine; All needs within reach         Jakob Granado, PT

## 2023-05-03 NOTE — ASSESSMENT & PLAN NOTE
Known malignant melanoma, completion of whole brain radiation, briefly started on treatment with encorafenib/binimetinib  Follows with outpatient heme/onc, Dr Darian Mclaughlin     Pending follow-up radiation therapy

## 2023-05-03 NOTE — OCCUPATIONAL THERAPY NOTE
Occupational Therapy Evaluation     Patient Name: Ramez Epps  LVNDL'Y Date: 5/3/2023  Problem List  Principal Problem:    Acute respiratory failure with hypoxia ([facility] [address] )  Active Problems:    History of pulmonary embolism    Autoimmune hemolytic anemia    Personal history of malignant melanoma    Watery diarrhea    Ground glass opacity present on imaging of lung    Past Medical History  Past Medical History:   Diagnosis Date    Acute diverticulitis 4/12/2023    ARABELLA (acute kidney injury) ([facility] [address] ) 3/29/2018    Anemia 11/02/2016    Anxiety     Arthritis     Autoimmune hemolytic anemia ([facility] [address] )     Claustrophobia     COVID 12/2020    DVT (deep venous thrombosis) (HCC)     GERD (gastroesophageal reflux disease)     Hearing loss, right     Hemolytic anemia (HCC)     History of transfusion     2018 - no adverse reaction    Hypertension     Malignant melanoma of leg, right (HCC) 07/01/2022    Palpitation     Portal vein thrombosis     PTSD (post-traumatic stress disorder)     Pulmonary emboli (HCC)     Squamous cell skin cancer 12/20/2022    SCCIS- 12/6/22    Tobacco abuse      Past Surgical History  Past Surgical History:   Procedure Laterality Date    CATARACT EXTRACTION Bilateral     CHOLECYSTECTOMY  07/18/2017    COLONOSCOPY      ELBOW ARTHROPLASTY Left     bursectomy    IR BIOPSY RETROPERITONEUM  3/17/2023    IR DRAINAGE TUBE CHECK WITH SCLEROSIS  10/06/2022    IR DRAINAGE TUBE CHECK WITH SCLEROSIS  10/10/2022    IR DRAINAGE TUBE CHECK WITH SCLEROSIS  10/20/2022    IR DRAINAGE TUBE CHECK WITH SCLEROSIS  10/27/2022    IR DRAINAGE TUBE CHECK WITH SCLEROSIS  11/04/2022    IR DRAINAGE TUBE CHECK WITH SCLEROSIS  11/15/2022    IR DRAINAGE TUBE CHECK WITH SCLEROSIS  11/25/2022    IR DRAINAGE TUBE PLACEMENT  09/19/2022    JOINT REPLACEMENT Right 02/02/2021    knee    KNEE SURGERY Right     meniscus tear    LYMPH NODE BIOPSY Right 07/29/2022    Procedure: BIOPSY LYMPH NODE SENTINEL;  Surgeon: Luz Elena Alvarez MD;  [facility];  Service: Surgical Oncology    MOHS SURGERY Right 12/20/2022    Right dorsal hand HUMPHREYIS-Dr Felipa Camilo    PA ARTHRP KNE CONDYLE&PLATU MEDIAL&LAT COMPARTMENTS Right 02/02/2021    Procedure: ARTHROPLASTY KNEE TOTAL;  Surgeon: Flavio Thomas MD;  [facility];  Service: Orthopedics    PA ARTHRP KNE CONDYLE&PLATU MEDIAL&LAT COMPARTMENTS Left 11/17/2021    Procedure: TOTAL KNEE REPLACEMENT;  Surgeon: Flavio Thomas MD;  [facility];  Service: Orthopedics    PA LAPS SURG Diana Junior [place]ales [place] N/A 12/23/2017    Procedure: CHOLECYSTECTOMY LAPAROSCOPIC with cholangiogram;  Surgeon: Madi Orozco MD;  [facility];  Service: General    PA SPLENECTOMY TOTAL SEPARATE PROCEDURE N/A 05/18/2017    Procedure: LAPAROSCOPIC HAND ASSIST SPLENECTOMY;  Surgeon: Hannah Summers MD;  [facility];  Service: Surgical Oncology    SHOULDER SURGERY Left     rotator cuff x4, reconstruction    SKIN BIOPSY  5 May 2022    SKIN CANCER EXCISION  29 July 2022    SKIN LESION EXCISION Right 07/29/2022    Procedure: WIDE EXCISION RIGHT MEDIAL THIGH;  Surgeon: Elizabeth Barragan MD;  [facility];  Service: Surgical Oncology           05/03/23 1049   OT Last Visit   OT Visit Date 05/03/23   Note Type   Note type Evaluation   Additional Comments Pt greeted in supine and agreeable to skilled OT evaluation  Pain Assessment   Pain Assessment Tool 0-10   Pain Score No Pain   Restrictions/Precautions   Weight Bearing Precautions Per Order No   Other Precautions Contact/isolation; Airborne/isolation;Multiple lines;Telemetry;O2;Fall Risk  (15 L O2)   Home Living   Type of Home House   Home Layout Two level;Bed/bath upstairs;1/2 bath on main level;Stairs to enter with rails  (0 SIDRA, 1 FOS to bed/ bath )   Bathroom Shower/Tub Walk-in shower   Bathroom Toilet Standard   Bathroom Equipment Shower chair;Grab bars around toilet   [address] Walker;Cane   Prior Function Level of [place] Independent with ADLs; Independent with functional mobility; Independent with IADLS   Lives With Spouse   Receives Help From Family   IADLs Independent with driving; Independent with medication management; Independent with meal prep   Falls in the last 6 months 0   Comments Prior to admission, pt lives with wife in a multi level home with 0 SIDRA, 1 FOS to 2nd floor bed / bath  1/2 bath on main level  Home has a tub shower with seat and grab bars, standard toilets  Owns a cane and RW- Does not use  Reports he was I with ADLs, IADLs and mobility  Drives  Retired  Reports 0 falls in the past 6 months  Has recently been using RW during to SOB  Lifestyle   Autonomy I with ADLs, mobility and IADLs  Reciprocal Relationships family  ADL   Where Assessed Edge of bed   Eating Assistance 5  Supervision/Setup   Grooming Assistance 4  Minimal Assistance   UB Bathing Assistance 3  Moderate Assistance   LB Bathing Assistance 3  Moderate Assistance   UB Dressing Assistance 3  Moderate Assistance   LB Dressing Assistance 2  Maximal Assistance   Toileting Assistance  2  Maximal Assistance   Bed Mobility   Supine to Sit 3  Moderate assistance   Additional items Assist x 2;HOB elevated; Bedrails; Increased time required;Verbal cues;LE management   Sit to Supine 3  Moderate assistance   Additional items Assist x 2; Increased time required;Verbal cues;LE management   Transfers   Sit to Stand 3  Moderate assistance   Additional items Assist x 2; Increased time required;Verbal cues   Stand to Sit 3  Moderate assistance   Additional items Assist x 2; Increased time required;Verbal cues   Functional Mobility   Functional Mobility 3  Moderate assistance   Additional Comments assist x2, HHA lateral side stepping     Additional items Hand hold assistance   Balance   Static Sitting Fair +   Dynamic Sitting Fair   Static Standing Poor +   Dynamic Standing Poor +   Ambulatory Poor +   Activity Tolerance   Activity Tolerance Patient limited by fatigue;Treatment limited secondary to medical complications (Comment)  (SOB)   Medical Staff Made Aware PT [facility]  Nurse Made Aware YASMIN Spencer   RUE Assessment   RUE Assessment WFL   LUE Assessment   LUE Assessment WFL   Hand Function   Gross Motor Coordination Functional   Fine Motor Coordination Functional   Psychosocial   Psychosocial (WDL) WDL   Cognition   Overall Cognitive Status WFL   Arousal/Participation Cooperative   Attention Within functional limits   Orientation Level Oriented X4   Following Commands Follows one step commands without difficulty   Comments pleasant and cooperative  Assessment   Limitation Decreased ADL status; Decreased UE strength;Decreased Safe judgement during ADL;Decreased endurance;Decreased self-care trans   Prognosis Good   Assessment Ramez Epps is a 70 y o  male with a PMH of metastatic melanoma with brain mets, diverticulosis, autoimmune hemolytic anemia who presents with weakness, shortness of breath  Pt admitted for medical management of acute respiratory failure with hypoxia  Bronchoscopy performed 5/1  Pt currently requiring 15L O2  Pt with precautions as follows: 15 L O2, contact airborne isolation  Pt with active orders for OT and up OOB as tolerated  Prior to admission, pt lives with wife in a multi level home with 0 SIDRA, 1 FOS to 2nd floor bed / bath  1/2 bath on main level  Home has a tub shower with seat and grab bars, standard toilets  Owns a cane and RW- Does not use  Reports he was I with ADLs, IADLs and mobility  Drives  Retired  Reports 0 falls in the past 6 months  Has recently been using RW during to SOB  Upon evaluation, pt presenting below functional baseline with deficits noted in strength, activity tolerance, balance, safety awareness which is limiting pts functional ability to complete ADLs/ IADLs, functional transfers and functional mobility  Vitals: 113/66 supine > 105/65 seated EOB  On 15 L O2 with activity O2 sats 83-93%  97% in supine at rest  Pt current level of function: bed mobility - modAx2; transfers- modAx2; functional mobility-modAx2 HHA; UB dressing / bathing - modA; LB dressing/ bathing- maxA; toileting - maxA  Pt would benefit from skilled OT 3-5x/wk to maximize functional independence  OT DC recommendation: post acute rehab services  Goals   Patient Goals to rest    STG Time Frame 3-5   Short Term Goal #1 Pt will improve activity tolerance to G for min 30 min txment sessions for increase engagement in functional tasks   Short Term Goal #2 Pt will complete bed mobility at a Mod I level w/ G balance/safety demonstrated to decrease caregiver assistance required   Short Term Goal  Pt will complete UB dressing/self care w/ mod I using adaptive device and DME as needed   LTG Time Frame 10-14   Long Term Goal #1 Pt will complete LB dressing/self care w/ mod I using adaptive device and DME as needed   Long Term Goal #2 Pt will complete toileting w/ mod I w/ G hygiene/thoroughness using DME as needed   Long Term Goal Pt will improve functional transfers/ mobility during ADL/IADL/leisure tasks to Mod I using DME as needed w/ G balance/safety   Plan   Treatment Interventions ADL retraining;Functional transfer training;UE strengthening/ROM; Endurance training;Patient/family training;Equipment evaluation/education; Neuromuscular reeducation; Compensatory technique education; Energy conservation; Activityengagement   Goal Expiration Date 05/17/23   OT Treatment Day 0   OT Frequency 3-5x/wk   Recommendation   OT Discharge Recommendation Post acute rehabilitation services   Additional Comments  The patient's raw score on the AM-PAC Daily Activity Inpatient Short Form is 14  A raw score of less than 19 suggests the patient may benefit from discharge to post-acute rehabilitation services  Please refer to the recommendation of the Occupational Therapist for safe discharge planning     -PAC Daily Activity Inpatient   Lower Body Dressing 2 Bathing 2   Toileting 2   Upper Body Dressing 2   Grooming 3   Eating 3   Daily Activity Raw Score 14   Daily Activity Standardized Score (Calc for Raw Score >=11) 33 39   AM-PAC Applied Cognition Inpatient   Following a Speech/Presentation 4   Understanding Ordinary Conversation 4   Taking Medications 4   Remembering Where Things Are Placed or Put Away 4   Remembering List of 4-5 Errands 4   Taking Care of Complicated Tasks 4   Applied Cognition Raw Score 24   Applied Cognition Standardized Score 62 21   Ty Brewer, OT

## 2023-05-03 NOTE — PLAN OF CARE
Problem: PHYSICAL THERAPY ADULT  Goal: Performs mobility at highest level of function for planned discharge setting  See evaluation for individualized goals  Description: Treatment/Interventions: Functional transfer training, LE strengthening/ROM, Elevations, Therapeutic exercise, Endurance training, Patient/family training, Equipment eval/education, Bed mobility, Gait training, Compensatory technique education, Continued evaluation, Spoke to nursing, OT  Equipment Recommended: Other (Comment) (monitor needs with progress )       See flowsheet documentation for full assessment, interventions and recommendations  Outcome: Progressing  Note: Prognosis: Fair  Problem List: Decreased strength, Decreased endurance, Impaired balance, Decreased mobility, Decreased safety awareness  Assessment: Jcarlos Holland is a 70 y o  male seen for PT evaluation following admission to [place] [address] on 4/30/2023 w/ Acute respiratory failure with hypoxia ([facility] [address] )   R/O COVID/TB  S/p bronchoscopy on 5/2  On airborne/contact precautions, r/o Cdiff  Comorbidities affecting pt’s functional performance include a significant PMH of cancer history and/or treatment and PE, HTN, arthritis, PTSD, COVID, mets to brain, hemolytic anemia, anxiety, DVT, GERD  Dasha Stevens Pt with active PT orders and activity orders for Ambulate  Prior to admission pt resides with spouse in [address] [place] home  At baseline, pt was independent without AD until one week leading to admission requiring RW support 2* functional decline with events leading to admission  Currently presents with functional limitations related to impairments in decreased strength , decreased balance, decreased activity tolerance, gait deviations, limited functional reach, Fluctuating vitals, Hypoxia and decreased cardiovascular endurance  Upon evaluation, pt currently requires mod Ax2 for bed mobility, transfers, and ambulation with BUE hand held assist  Gait slowed, shuffling, short step  Limited activity tolerance   + LOWE  Desaturates to 83% on highflow, recovers to 93% upon return to supine  Increased recovery time noted  These impairments, as well at pt’s SIDRA home environment, steps within home environment, difficulty performing ADLs, difficulty performing IADLs, difficulty performing transfers/mobility, fall risk , functional decline , new O2 requirements, increase in O2 requirements  and increased reliance on DME  limit pt’s ability to safely perform functional mobility   Based on the PT evaluation, is high complexity evaluation 2* Ongoing medical status, Trending/abnormal lab values, Risk for falls, Imaging/test/procedure pending, Continuous monitoring, Telemetry, Decreased activity tolerance compared to baseline, Decline from PLOF , hypoxia, increased respiratory rate and new onset O2 use  Pt will continue to benefit from skilled PT services to address impairments and optimize outcomes  The patient's AM-PAC Basic Mobility Inpatient Short Form Raw Score is 10    A Raw score of less than or equal to 16 suggests the patient may benefit from discharge to post-acute rehabilitation services  Please also refer to the recommendation of the Physical Therapist for safe discharge planning  From PT standpoint, recommend short term rehab upon D/C  PT will continue to follow pt 3-5 x/wk to address impairments and optimize outcomes  Barriers to Discharge: Inaccessible home environment  Barriers to Discharge Comments: stairs  PT Discharge Recommendation: Post acute rehabilitation services    See flowsheet documentation for full assessment

## 2023-05-03 NOTE — PLAN OF CARE
Problem: OCCUPATIONAL THERAPY ADULT  Goal: Performs self-care activities at highest level of function for planned discharge setting  See evaluation for individualized goals  Description: Treatment Interventions: ADL retraining, Functional transfer training, UE strengthening/ROM, Endurance training, Patient/family training, Equipment evaluation/education, Neuromuscular reeducation, Compensatory technique education, Energy conservation, Activityengagement          See flowsheet documentation for full assessment, interventions and recommendations  Note: Limitation: Decreased ADL status, Decreased UE strength, Decreased Safe judgement during ADL, Decreased endurance, Decreased self-care trans  Prognosis: Good  Assessment: Elsy Rodriges is a 70 y o  male with a PMH of metastatic melanoma with brain mets, diverticulosis, autoimmune hemolytic anemia who presents with weakness, shortness of breath  Pt admitted for medical management of acute respiratory failure with hypoxia  Bronchoscopy performed 5/1  Pt currently requiring 15L O2  Pt with precautions as follows: 15 L O2, contact airborne isolation  Pt with active orders for OT and up OOB as tolerated  Prior to admission, pt lives with wife in a multi level home with 0 SIDRA, 1 FOS to 2nd floor bed / bath  1/2 bath on main level  Home has a tub shower with seat and grab bars, standard toilets  Owns a cane and RW- Does not use  Reports he was I with ADLs, IADLs and mobility  Drives  Retired  Reports 0 falls in the past 6 months  Has recently been using RW during to SOB  Upon evaluation, pt presenting below functional baseline with deficits noted in strength, activity tolerance, balance, safety awareness which is limiting pts functional ability to complete ADLs/ IADLs, functional transfers and functional mobility  Vitals: 113/66 supine > 105/65 seated EOB  On 15 L O2 with activity O2 sats 83-93%   97% in supine at rest  Pt current level of function: bed mobility - modAx2; transfers- modAx2; functional mobility-modAx2 HHA; UB dressing / bathing - modA; LB dressing/ bathing- maxA; toileting - maxA  Pt would benefit from skilled OT 3-5x/wk to maximize functional independence  OT DC recommendation: post acute rehab services       OT Discharge Recommendation: Post acute rehabilitation services

## 2023-05-03 NOTE — PLAN OF CARE
Problem: INFECTION - ADULT  Goal: Absence or prevention of progression during hospitalization  Description: INTERVENTIONS:  - Assess and monitor for signs and symptoms of infection  - Monitor lab/diagnostic results  - Monitor all insertion sites, i e  indwelling lines, tubes, and drains  - Monitor endotracheal if appropriate and nasal secretions for changes in amount and color  - [place] appropriate cooling/warming therapies per order  - Administer medications as ordered  - Instruct and encourage patient and family to use good hand hygiene technique  - Identify and instruct in appropriate isolation precautions for identified infection/condition  Outcome: Progressing     Problem: SAFETY ADULT  Goal: Maintain or return to baseline ADL function  Description: INTERVENTIONS:  -  Assess patient's ability to carry out ADLs; assess patient's baseline for ADL function and identify physical deficits which impact ability to perform ADLs (bathing, care of mouth/teeth, toileting, grooming, dressing, etc )  - Assess/evaluate cause of self-care deficits   - Assess range of motion  - Assess patient's mobility; develop plan if impaired  - Assess patient's need for assistive devices and provide as appropriate  - Encourage maximum independence but intervene and supervise when necessary  - Involve family in performance of ADLs  - Assess for home care needs following discharge   - Consider OT consult to assist with ADL evaluation and planning for discharge  - Provide patient education as appropriate  Outcome: Progressing     Problem: CARDIOVASCULAR - ADULT  Goal: Maintains optimal cardiac output and hemodynamic stability  Description: INTERVENTIONS:  - Monitor I/O, vital signs and rhythm  - Monitor for S/S and trends of decreased cardiac output  - Administer and titrate ordered vasoactive medications to optimize hemodynamic stability  - Assess quality of pulses, skin color and temperature  - Assess for signs of decreased coronary artery perfusion  - Instruct patient to report change in severity of symptoms  Outcome: Progressing     Problem: RESPIRATORY - ADULT  Goal: Achieves optimal ventilation and oxygenation  Description: INTERVENTIONS:  - Assess for changes in respiratory status  - Assess for changes in mentation and behavior  - Position to facilitate oxygenation and minimize respiratory effort  - Oxygen administered by appropriate delivery if ordered  - Initiate smoking cessation education as indicated  - Encourage broncho-pulmonary hygiene including cough, deep breathe, Incentive Spirometry  - Assess the need for suctioning and aspirate as needed  - Assess and instruct to report SOB or any respiratory difficulty  - Respiratory Therapy support as indicated  Outcome: Progressing     Problem: METABOLIC, FLUID AND ELECTROLYTES - ADULT  Goal: Electrolytes maintained within normal limits  Description: INTERVENTIONS:  - Monitor labs and assess patient for signs and symptoms of electrolyte imbalances  - Administer electrolyte replacement as ordered  - Monitor response to electrolyte replacements, including repeat lab results as appropriate  - Instruct patient on fluid and nutrition as appropriate  Outcome: Progressing     Problem: Prexisting or High Potential for Compromised Skin Integrity  Goal: Skin integrity is maintained or improved  Description: INTERVENTIONS:  - Identify patients at risk for skin breakdown  - Assess and monitor skin integrity  - Assess and monitor nutrition and hydration status  - Monitor labs   - Assess for incontinence   - Turn and reposition patient  - Assist with mobility/ambulation  - Relieve pressure over bony prominences  - Avoid friction and shearing  - Provide appropriate hygiene as needed including keeping skin clean and dry  - Evaluate need for skin moisturizer/barrier cream  - Collaborate with interdisciplinary team   - Patient/family teaching  - Consider wound care consult   Outcome: Progressing

## 2023-05-03 NOTE — ASSESSMENT & PLAN NOTE
Hx of diverticulitis, C  Diff in the past  Remains with intermittent episodes of watery diarrhea but improving today  Without diverticulitis on CT scan     Plan:  • Prior C  Diff PCR positive but toxins negative, probable colonization  • C  Diff ordered, positive toxin on PCR in the setting of prior history of C  Dif  • Monitor with use of antibiotics   • Started on C   Diff prophylaxis with oral vancomycin 125 mg BID

## 2023-05-04 NOTE — PLAN OF CARE
Problem: PHYSICAL THERAPY ADULT  Goal: Performs mobility at highest level of function for planned discharge setting  See evaluation for individualized goals  Description: Treatment/Interventions: Functional transfer training, LE strengthening/ROM, Elevations, Therapeutic exercise, Endurance training, Patient/family training, Equipment eval/education, Bed mobility, Gait training, Compensatory technique education, Continued evaluation, Spoke to nursing, OT  Equipment Recommended: Other (Comment) (monitor needs with progress )       See flowsheet documentation for full assessment, interventions and recommendations  Outcome: Progressing  Note: Prognosis: Fair  Problem List: Decreased endurance, Decreased range of motion, Decreased strength, Decreased mobility  Assessment: Pt  performed all LE [place] in seated and semi reclined positions  Pt  onhigh flow O2 and noted to destat with activities to 87%  Rest needed frequently to recover  pt  declined to stand up initially reporting I will feel dizzy  later pt  reported he did not want to stand up because he does not feel comfortable with any techqnique therapist suggested or his own way of holding onto the RW for support  pt  remained on recliner chair at the end of session with all needs within reach  Will continue to follow per PT POC during hospital stay to [facility] functional mobility  Barriers to Discharge: None  Barriers to Discharge Comments: stairs  PT Discharge Recommendation: Post acute rehabilitation services    See flowsheet documentation for full assessment

## 2023-05-04 NOTE — PROGRESS NOTES
[address] [place] [address]  Progress Note  Name: Joseph Graec  MRN: [id number]  Unit/Bed#: ICU 07 I Date of Admission: 4/30/2023   Date of Service: 5/4/2023 I Hospital Day: 4    Assessment/Plan   * Acute respiratory failure with hypoxia [facility])  Assessment & Plan  Presents with nonproductive cough, chest tightness, shortness of breath, generalized weakness  Recent admission for diverticulitis  On arrival:   • VS: afebrile, normotensive, tachycardia, tachypnea, new o2 on 3L progressed to midflow  • Labs: CBC chronic leukocytosis, BMP hypokalemia/hypomagnesemia, lactate 2 4>1 1, BNP 26, trop wnl   • Imaging: CTA C/A/P: no PE, new bilateral GGO, scattered pulmonary/liver/inguinal nodules, osseous metastasis   • Initially on 3LNC, progressed to midflow on floor, monitor for progression to HFNC/Bipap  Lungs sound clear without wheezing/crackles  Does not examine volume overloaded  5/1:  Increased O2 requirement this AM, desat to 70s  Improved with 15L NC  Bronchoscopy performed and patient transferred to the ICU for BiPAP, alternating FiO2 down now on HFNC  Plan:   • Continue supplemental O2, maintain O2 sat> 92%  Currently on 13L NC, wean as able  • ABX: Day#4  Continue cefepime 2 g q 8 hrs   • ID consulted and discontinued azithromycin and vancomycin  • Pneumocystis, histoplasma, aspergillus, fungitell assay pending  F/u results  • Pneumocystis positive as of 5/3  • ID started IV Bactrim 380 mg q 6 hrs  • Diet: regular  • Lines/Drains/Tubes: peripheral IV  • S/P BAL; follow-up with results      Ground glass opacity present on imaging of lung  Assessment & Plan  New bilateral lung ground glass opacities/infiltrates  Past smoking history  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, 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   Denies history of pulmonary disease     Urine legionella/strep negative  Procal 0 28->0 59  Diffdx: interstitial pneumonia, pneumonitis, ILD, metastatic disease   Pneumocystic jirovecii positive Plan:  • Afebrile, chronic leukocytosis in setting of steroids, viral panel negative   • Check procal/CRP although unreliable in malignancy   • Check sputum G/S & Cx, RP2  • Pulmonary hygiene with IS, duoneb, mucinex, tessalon   • Pulmonology consulted; transferred to ICU  • F/U with BAL results       Pneumocystis carinii pneumonia (HCC)  Assessment & Plan  Positive bronchial lavage culture     - Bactrim       Watery diarrhea  Assessment & Plan  Hx of diverticulitis, C  Diff in the past  Remains with intermittent episodes of watery diarrhea but improving today  Without diverticulitis on CT scan     Plan:  • Prior C  Diff PCR positive but toxins negative, probable colonization  • C  Diff ordered, positive toxin on PCR in the setting of prior history of C  Dif  • Monitor with use of antibiotics   • Started on C  Diff prophylaxis with oral vancomycin 125 mg BID     Personal history of malignant melanoma  Assessment & Plan  Known malignant melanoma, completion of whole brain radiation, briefly started on treatment with encorafenib/binimetinib  Follows with outpatient heme/onc, Dr Mann [facility]  Pending follow-up radiation therapy         Autoimmune hemolytic anemia  Assessment & Plan  History of warm antibody hemolytic anemia, 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 Yes  · Need for changes to pain regimen? No   B: Both SAT/SAT  · N/A   C: Choice of Sedation · RASS Goal: N/A patient not on sedation  · Need for changes to sedation or analgesia regimen? No   D: Delirium · CAM-ICU: Negative   E: Early Mobility  · Plan for early mobility? NA   F: Family Engagement · Plan for family engagement today? Yes       Antibiotic Review: Patient on appropriate coverage based on culture data  Prophylaxis:  VTE VTE covered by:  dabigatran etexilate, Oral, 150 mg at 05/03/23 2151       Stress Ulcer  covered bypantoprazole (PROTONIX) EC tablet 40 mg [[id number]]       Subjective   HPI/24hr events: Patient started on IV Bactrim last night due to positivity for Pneumocystis  Was transitioned initially to midflow (15L) from high-flow yesterday and transitioned back to high flow overnight  Currently feels comfortable and denies significant shortness of breath  Reports improvement in chest congestion and pressure  Reports cough that is non-productive  Denies any fever and chills  Objective                            Vitals I/O      Most Recent Min/Max in 24hrs   Temp 98 °F (36 7 °C) Temp  Min: 98 °F (36 7 °C)  Max: 99 3 °F (37 4 °C)   Pulse 72 Pulse  Min: 68  Max: 102   Resp 17 Resp  Min: 17  Max: 31   /64 BP  Min: 111/63  Max: 144/73   O2 Sat 99 % SpO2  Min: 81 %  Max: 100 %      Intake/Output Summary (Last 24 hours) at 5/4/2023 0743  Last data filed at 5/4/2023 0601  Gross per 24 hour   Intake 1580 ml   Output 2100 ml   Net -520 ml         Diet Regular; Regular House     Invasive Monitoring Physical exam   None Physical Exam  Constitutional:       General: He is not in acute distress  Appearance: He is ill-appearing  Cardiovascular:      Rate and Rhythm: Normal rate and regular rhythm  Pulses: Normal pulses  Heart sounds: Normal heart sounds  No murmur heard  No friction rub  No gallop  Pulmonary:      Effort: Pulmonary effort is normal  No respiratory distress  Breath sounds: Normal breath sounds  No wheezing or rhonchi  Comments: 13L, sat 94%  Abdominal:      General: Abdomen is flat  Bowel sounds are normal  There is no distension  Palpations: Abdomen is soft  Tenderness: There is no abdominal tenderness  Musculoskeletal:      Right lower leg: No edema        Left lower leg: No edema  Skin:     Capillary Refill: Capillary refill takes less than 2 seconds  Neurological:      Mental Status: He is alert and oriented to person, place, and time  Psychiatric:         Mood and Affect: Mood normal          Behavior: Behavior normal               Diagnostic Studies      EKG: Telemetry showing NSR  Imaging: I have personally reviewed pertinent reports         Medications:  Scheduled PRN   cefepime, 2,000 mg, Q8H  dabigatran etexilate, 150 mg, Q12H RAVIN  dexamethasone, 4 mg, Q8H  hydrochlorothiazide, 25 mg, Daily  memantine, 10 mg, BID  metoprolol succinate, 12 5 mg, Daily  pantoprazole, 40 mg, Early Morning  potassium chloride, 40 mEq, Once  prazosin, 1 mg, HS  sulfamethoxazole-trimethoprim, 20 mg/kg/day of trimethoprim (Adjusted), Q6H  vancomycin oral, 125 mg, Q12H RAVIN      acetaminophen, 650 mg, Q6H PRN  albuterol, 2 puff, Q4H PRN  ALPRAZolam, 0 5 mg, HS PRN  aluminum-magnesium hydroxide-simethicone, 30 mL, Q6H PRN  benzonatate, 100 mg, TID PRN  dextromethorphan-guaiFENesin, 10 mL, Q4H PRN  ondansetron, 4 mg, Q6H PRN  polyethylene glycol, 17 g, Daily PRN       Continuous          Labs:    CBC    Recent Labs     05/03/23 0530 05/04/23  0612   WBC 22 20* 20 10*   HGB 9 0* 10 3*   HCT 27 8* 30 4*    268     BMP    Recent Labs     05/03/23 0530 05/04/23  0612   SODIUM 134* 132*   K 3 8 3 3*    97   CO2 27 26   AGAP 7 9   BUN 23 19   CREATININE 1 08 1 13   CALCIUM 8 5 8 5       Coags    No recent results     Additional Electrolytes  Recent Labs     05/04/23  0612   MG 1 5*   PHOS 3 0          Blood Gas    No recent results  No recent results LFTs  Recent Labs     05/03/23 0530 05/04/23  0612   ALT 20 17   AST 27 20   ALKPHOS 52 51   ALB 2 8* 2 8*   TBILI 0 90 0 46       Infectious  Recent Labs     05/03/23  0530 05/04/23  0612   PROCALCITONI 0 74* 0 57*     Glucose  Recent Labs     05/03/23 0530 05/04/23  0612   GLUC 112 99             Hanxiong Nicholos Boots, DO

## 2023-05-04 NOTE — ASSESSMENT & PLAN NOTE
Presents with nonproductive cough, chest tightness, shortness of breath, generalized weakness  Recent admission for diverticulitis  On arrival:   • VS: afebrile, normotensive, tachycardia, tachypnea, new o2 on 3L progressed to midflow  • Labs: CBC chronic leukocytosis, BMP hypokalemia/hypomagnesemia, lactate 2 4>1 1, BNP 26, trop wnl   • Imaging: CTA C/A/P: no PE, new bilateral GGO, scattered pulmonary/liver/inguinal nodules, osseous metastasis   • Initially on 3LNC, progressed to midflow on floor, monitor for progression to HFNC/Bipap  Lungs sound clear without wheezing/crackles  Does not examine volume overloaded  5/1:  Increased O2 requirement this AM, desat to 70s  Improved with 15L NC  Bronchoscopy performed and patient transferred to the ICU for BiPAP, alternating FiO2 down now on HFNC  Plan:   • Continue supplemental O2, maintain O2 sat> 92%  Currently on 13L NC, wean as able  • ABX: Day#4  Continue cefepime 2 g q 8 hrs   • ID consulted and discontinued azithromycin and vancomycin  • Pneumocystis, histoplasma, aspergillus, fungitell assay pending   F/u results  • Pneumocystis positive as of 5/3  • ID started IV Bactrim 380 mg q 6 hrs  • Diet: regular  • Lines/Drains/Tubes: peripheral IV  • S/P BAL; follow-up with results

## 2023-05-04 NOTE — PLAN OF CARE
Problem: Potential for Falls  Goal: Patient will remain free of falls  Description: INTERVENTIONS:  - Educate patient/family on patient safety including physical limitations  - Instruct patient to call for assistance with activity   - Consult OT/PT to assist with strengthening/mobility   - Keep Call bell within reach  - Keep bed low and locked with side rails adjusted as appropriate  - Keep care items and personal belongings within reach  - Initiate and maintain comfort rounds  - Make Fall Risk Sign visible to staff  - Apply yellow socks and bracelet for high fall risk patients  - Consider moving patient to room near nurses station  Outcome: Progressing     Problem: MOBILITY - ADULT  Goal: Maintain or return to baseline ADL function  Description: INTERVENTIONS:  -  Assess patient's ability to carry out ADLs; assess patient's baseline for ADL function and identify physical deficits which impact ability to perform ADLs (bathing, care of mouth/teeth, toileting, grooming, dressing, etc )  - Assess/evaluate cause of self-care deficits   - Assess range of motion  - Assess patient's mobility; develop plan if impaired  - Assess patient's need for assistive devices and provide as appropriate  - Encourage maximum independence but intervene and supervise when necessary  - Involve family in performance of ADLs  - Assess for home care needs following discharge   - Consider OT consult to assist with ADL evaluation and planning for discharge  - Provide patient education as appropriate  Outcome: Progressing  Goal: Maintains/Returns to pre admission functional level  Description: INTERVENTIONS:  - Perform BMAT or MOVE assessment daily    - Set and communicate daily mobility goal to care team and patient/family/caregiver     - Collaborate with rehabilitation services on mobility goals if consulted  - Out of bed for toileting  - Record patient progress and toleration of activity level   Outcome: Progressing     Problem: INFECTION - ADULT  Goal: Absence or prevention of progression during hospitalization  Description: INTERVENTIONS:  - Assess and monitor for signs and symptoms of infection  - Monitor lab/diagnostic results  - Monitor all insertion sites, i e  indwelling lines, tubes, and drains  - Monitor endotracheal if appropriate and nasal secretions for changes in amount and color  - [place] appropriate cooling/warming therapies per order  - Administer medications as ordered  - Instruct and encourage patient and family to use good hand hygiene technique  - Identify and instruct in appropriate isolation precautions for identified infection/condition  Outcome: Progressing  Goal: Absence of fever/infection during neutropenic period  Description: INTERVENTIONS:  - Monitor WBC    Outcome: Progressing     Problem: SAFETY ADULT  Goal: Patient will remain free of falls  Description: INTERVENTIONS:  - Educate patient/family on patient safety including physical limitations  - Instruct patient to call for assistance with activity   - Consult OT/PT to assist with strengthening/mobility   - Keep Call bell within reach  - Keep bed low and locked with side rails adjusted as appropriate  - Keep care items and personal belongings within reach  - Initiate and maintain comfort rounds  - Make Fall Risk Sign visible to staff  - Apply yellow socks and bracelet for high fall risk patients  - Consider moving patient to room near nurses station  Outcome: Progressing  Goal: Maintain or return to baseline ADL function  Description: INTERVENTIONS:  -  Assess patient's ability to carry out ADLs; assess patient's baseline for ADL function and identify physical deficits which impact ability to perform ADLs (bathing, care of mouth/teeth, toileting, grooming, dressing, etc )  - Assess/evaluate cause of self-care deficits   - Assess range of motion  - Assess patient's mobility; develop plan if impaired  - Assess patient's need for assistive devices and provide as appropriate  - Encourage maximum independence but intervene and supervise when necessary  - Involve family in performance of ADLs  - Assess for home care needs following discharge   - Consider OT consult to assist with ADL evaluation and planning for discharge  - Provide patient education as appropriate  Outcome: Progressing  Goal: Maintains/Returns to pre admission functional level  Description: INTERVENTIONS:  - Perform BMAT or MOVE assessment daily    - Set and communicate daily mobility goal to care team and patient/family/caregiver     - Collaborate with rehabilitation services on mobility goals if consulted  - Out of bed for toileting  - Record patient progress and toleration of activity level   Outcome: Progressing     Problem: CARDIOVASCULAR - ADULT  Goal: Maintains optimal cardiac output and hemodynamic stability  Description: INTERVENTIONS:  - Monitor I/O, vital signs and rhythm  - Monitor for S/S and trends of decreased cardiac output  - Administer and titrate ordered vasoactive medications to optimize hemodynamic stability  - Assess quality of pulses, skin color and temperature  - Assess for signs of decreased coronary artery perfusion  - Instruct patient to report change in severity of symptoms  Outcome: Progressing  Goal: Absence of cardiac dysrhythmias or at baseline rhythm  Description: INTERVENTIONS:  - Continuous cardiac monitoring, vital signs, obtain 12 lead EKG if ordered  - Administer antiarrhythmic and heart rate control medications as ordered  - Monitor electrolytes and administer replacement therapy as ordered  Outcome: Progressing     Problem: RESPIRATORY - ADULT  Goal: Achieves optimal ventilation and oxygenation  Description: INTERVENTIONS:  - Assess for changes in respiratory status  - Assess for changes in mentation and behavior  - Position to facilitate oxygenation and minimize respiratory effort  - Oxygen administered by appropriate delivery if ordered  - Initiate smoking cessation education as indicated  - Encourage broncho-pulmonary hygiene including cough, deep breathe, Incentive Spirometry  - Assess the need for suctioning and aspirate as needed  - Assess and instruct to report SOB or any respiratory difficulty  - Respiratory Therapy support as indicated  Outcome: Progressing     Problem: GASTROINTESTINAL - ADULT  Goal: Minimal or absence of nausea and/or vomiting  Description: INTERVENTIONS:  - Administer IV fluids if ordered to ensure adequate hydration  - Maintain NPO status until nausea and vomiting are resolved  - Nasogastric tube if ordered  - Administer ordered antiemetic medications as needed  - Provide nonpharmacologic comfort measures as appropriate  - Advance diet as tolerated, if ordered  - Consider nutrition services referral to assist patient with adequate nutrition and appropriate food choices  Outcome: Progressing  Goal: Maintains or returns to baseline bowel function  Description: INTERVENTIONS:  - Assess bowel function  - Encourage oral fluids to ensure adequate hydration  - Administer IV fluids if ordered to ensure adequate hydration  - Administer ordered medications as needed  - Encourage mobilization and activity  - Consider nutritional services referral to assist patient with adequate nutrition and appropriate food choices  Outcome: Progressing  Goal: Maintains adequate nutritional intake  Description: INTERVENTIONS:  - Monitor percentage of each meal consumed  - Identify factors contributing to decreased intake, treat as appropriate  - Assist with meals as needed  - Monitor I&O, weight, and lab values if indicated  - Obtain nutrition services referral as needed  Outcome: Progressing  Goal: Establish and maintain optimal ostomy function  Description: INTERVENTIONS:  - Assess bowel function  - Encourage oral fluids to ensure adequate hydration  - Administer IV fluids if ordered to ensure adequate hydration   - Administer ordered medications as needed  - Encourage mobilization and activity  - Nutrition services referral to assist patient with appropriate food choices  - Assess stoma site  - Consider wound care consult   Outcome: Progressing  Goal: Oral mucous membranes remain intact  Description: INTERVENTIONS  - Assess oral mucosa and hygiene practices  - Implement preventative oral hygiene regimen  - Implement oral medicated treatments as ordered  - Initiate Nutrition services referral as needed  Outcome: Progressing     Problem: METABOLIC, FLUID AND ELECTROLYTES - ADULT  Goal: Electrolytes maintained within normal limits  Description: INTERVENTIONS:  - Monitor labs and assess patient for signs and symptoms of electrolyte imbalances  - Administer electrolyte replacement as ordered  - Monitor response to electrolyte replacements, including repeat lab results as appropriate  - Instruct patient on fluid and nutrition as appropriate  Outcome: Progressing  Goal: Fluid balance maintained  Description: INTERVENTIONS:  - Monitor labs   - Monitor I/O and WT  - Instruct patient on fluid and nutrition as appropriate  - Assess for signs & symptoms of volume excess or deficit  Outcome: Progressing  Goal: Glucose maintained within target range  Description: INTERVENTIONS:  - Monitor Blood Glucose as ordered  - Assess for signs and symptoms of hyperglycemia and hypoglycemia  - Administer ordered medications to maintain glucose within target range  - Assess nutritional intake and initiate nutrition service referral as needed  Outcome: Progressing     Problem: Prexisting or High Potential for Compromised Skin Integrity  Goal: Skin integrity is maintained or improved  Description: INTERVENTIONS:  - Identify patients at risk for skin breakdown  - Assess and monitor skin integrity  - Assess and monitor nutrition and hydration status  - Monitor labs   - Assess for incontinence   - Turn and reposition patient  - Assist with mobility/ambulation  - Relieve pressure over bony prominences  - Avoid friction and shearing  - Provide appropriate hygiene as needed including keeping skin clean and dry  - Evaluate need for skin moisturizer/barrier cream  - Collaborate with interdisciplinary team   - Patient/family teaching  - Consider wound care consult   Outcome: Progressing

## 2023-05-04 NOTE — ASSESSMENT & PLAN NOTE
Known malignant melanoma, completion of whole brain radiation, briefly started on treatment with encorafenib/binimetinib  Follows with outpatient heme/onc, Dr Henny Monte     Pending follow-up radiation therapy

## 2023-05-04 NOTE — PHYSICAL THERAPY NOTE
Physical Therapy Treatment Note     05/04/23 1536   PT Last Visit   PT Visit Date 05/04/23   Pain Assessment   Pain Assessment Tool 0-10   Pain Score No Pain   Restrictions/Precautions   Weight Bearing Precautions Per Order No   Other Precautions Contact/isolation; Airborne/isolation;Multiple lines;Telemetry;O2;Fall Risk  (High flow O2)   General   Chart Reviewed Yes   Subjective   Subjective Pt  agreeable to PT   Endurance Deficit   Endurance Deficit Yes   Endurance Deficit Description LOWE   Activity Tolerance   Activity Tolerance Patient tolerated treatment well   Nurse Made Aware Yes   Exercises   TKR Sitting;Supine;Bilateral;AROM;20 reps  (HS, AP, quad sets, LAQ)   Assessment   Prognosis Fair   Problem List Decreased endurance;Decreased range of motion;Decreased strength;Decreased mobility   Assessment Pt  performed all LE Yan[place] in seated and semi reclined positions  Pt  onhigh flow O2 and noted to destat with activities to 87%  Rest needed frequently to recover  pt  declined to stand up initially reporting I will feel dizzy  later pt  reported he did not want to stand up because he does not feel comfortable with any techqnique therapist suggested or his own way of holding onto the RW for support  pt  remained on recliner chair at the end of session with all needs within reach  Will continue to follow per PT POC during hospital stay to [facility] functional mobility  Barriers to Discharge None   Goals   Patient Goals None reported   STG Expiration Date 05/13/23   PT Treatment Day 1   Plan   Treatment/Interventions LE strengthening/ROM; Therapeutic exercise;Patient/family training;Spoke to nursing   Progress Slow progress, decreased activity tolerance   PT Frequency 3-5x/wk   Recommendation   PT Discharge Recommendation Post acute rehabilitation services   AM-PAC Basic Mobility Inpatient   Turning in Flat Bed Without Bedrails 3   Lying on Back to Sitting on Edge of Flat Bed Without Bedrails 2   Moving Bed to Chair 2 Standing Up From Chair Using Arms 2   Walk in Room 2   Climb 3-5 Stairs With Railing 1   Basic Mobility Inpatient Raw Score 12   Basic Mobility Standardized Score 32 23   Turning Head Towards Sound 4   Follow Simple Instructions 4   Low Function Basic Mobility Raw Score  20   Low Function Basic Mobility Standardized Score  32 8   Highest Level Of Mobility   -HLM Goal 4: Move to chair/commode   JH-HLM Achieved 4: Move to chair/commode   End of Consult   Patient Position at End of Consult Bedside chair; All needs within reach         Yamila Neves PTA    An AM-PAC basic mobility standardized score less than 42 9 suggest the patient may benefit from discharge to post-acute rehab services

## 2023-05-04 NOTE — PROGRESS NOTES
Progress Note - [facility] Infectious Disease   Leartis Body 70 y o  male MRN: [id number]  Unit/Bed#: ICU 07 Encounter: [id number]    IMPRESSION & RECOMMENDATIONS:   1  Pneumocystis pneumonia  CXR and CT chest show new bilateral GGO  Consider infectious vs inflammatory etiologies in this chronically immunosuppressed patient  PJP DFA from BAL positive  RP2 negative and COVID19 negative  Patient lived in [place] for 15 years, thus consider possibility of TB, though no other RF for such  Pulmonology following and he is s/p bronchoscopy for BAL 5/1/23 with evidence of cloudy secretions  Now with worsening O2 requirement, which can be explained by paradoxical effect after initiation of appropriate abx therapy yesterday    -will discontinue IV cefepime   -agree with IV SMX/TMP for now, will adjust dose to 320mg q6h (~15mg/kg TMP based on  ABW)  -once clinically improved, would switch to PO Bactrim 2 DS QID   -continue chronic decadron  -following treatment, will start PPX PJP treatment while on steroids   -will need outpatient ID follow up upon discharge; will notify office when closer to d/c  -follow up BAL cx including legionella, AFB, histoplasma Ag and aspergillus Ag, TB PCR  -follow up BAL cytology   -follow up serum 1,3-Beta-D-Glucan and strongyloides IgG  -follow-up blood cultures   -monitor CBCD, temperature and hemodynamics  -TB airborne isolation not required from ID standpoint      2  Acute respiratory failure with hypoxia  This is presumed secondary to #1  Patient continues to have tenuous respiratory status  He is requiring HFNC in ICU at this interval     -work up as above  -close pulmonology follow up  -continue respiratory protocol and pulmonary toileting      3  Leukocytosis  Suspect some component of steroid induced leukocytosis  Blood cultures are negative  He is afebrile   He is hemodynamically stable    -follow up blood cultures  -monitor CBCd, temperature and hemodynamics       4  Hx of malignant melanoma, mets to brain, lung, liver, LN, bone  S/p whole brain radiation  Briefly started on treatment with encorafenib/binimetinib and was supposed to restart on 4/3 but now on hold due to recent acute illness  Follows with outpatient heme/onc, Dr Miranda   -close onc follow up      5  Chronic diarrhea  Known hx of C diff in the past  Recent admission for diverticulitis  CT A/P negative  C  Diff PCR positive with negative EIA  Likely colonized  -continue PPX dosing PO vanco while on systemic abx      6  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, 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  Follows outpatient heme/onc  Previously treated with Rituxan and recently restarted on dexamethasone for #3  Follows Dr Kelli Rushing  -monitor CBC     7  Hx PE on Pradaxa  CTA chest negative for VTE  Antibiotics:  IV Bactrim D1  PO vanco ppx     I have discussed the above management plan in detail with patient  I have discussed the above management plan in detail with patient's RN, and the primary service, CCU      24 Hour events:  Yesterday and overnight notes reviewed  PCP DFA resulted positive and IV bactrim started, placed back on HFNC  Subjective:  Patient has no fever, chills, sweats; no nausea, vomiting, diarrhea; states his cough is improved and he feels he is able to take deeper breaths today  Tolerating IV SMXTMP so far  Objective:  Vitals:  Temp:  [98 °F (36 7 °C)-99 3 °F (37 4 °C)] 98 °F (36 7 °C)  HR:  [] 72  Resp:  [17-31] 17  BP: (111-144)/(57-73) 112/64  SpO2:  [81 %-100 %] 99 %  Temp (24hrs), Av 7 °F (37 1 °C), Min:98 °F (36 7 °C), Max:99 3 °F (37 4 °C)  Current: Temperature: 98 °F (36 7 °C)    PHYSICAL EXAM:  General Appearance:  Appearing chronically ill, globally weak though sitting upright in bedside recliner, nontoxic, and in no distress   HEENT: Normocephalic, without obvious abnormality, atraumatic  Conjunctiva pink and sclera anicteric  Oropharynx moist without lesions       Lungs:   Improving inspiratory effort with improved respiratory reserve, dec at bases though respirations unlabored  Remains on HFNC satting 96%   Heart:  RRR; no murmur, rub or gallop   Abdomen:   Soft, non-tender, non-distended, positive bowel sounds    Extremities: No cyanosis, clubbing or edema   Skin: No rashes or lesions  No draining wounds noted  Peripheral IV intact without evidence of erythema, warmth, or exudate  LABS, IMAGING, & OTHER STUDIES:  Extensive review of the medical records in epic including review of the notes, radiographs, and laboratory results were reviewed personally as below  Lab Results:  I have personally reviewed pertinent labs  Results from last 7 days   Lab Units 05/04/23  0612 05/03/23  0530 05/02/23  0501   WBC Thousand/uL 20 10* 22 20* 18 07*   HEMOGLOBIN g/dL 10 3* 9 0* 10 6*   PLATELETS Thousands/uL 268 358 327     Results from last 7 days   Lab Units 05/04/23  0612 05/03/23  0530 05/02/23  0501   SODIUM mmol/L 132* 134* 134*   POTASSIUM mmol/L 3 3* 3 8 3 6   CHLORIDE mmol/L 97 100 101   CO2 mmol/L 26 27 26   BUN mg/dL 19 23 26*   CREATININE mg/dL 1 13 1 08 1 19   EGFR ml/min/1 73sq m 65 69 61   CALCIUM mg/dL 8 5 8 5 8 2*   AST U/L 20 27 25   ALT U/L 17 20 23   ALK PHOS U/L 51 52 53     Results from last 7 days   Lab Units 05/02/23  1615 05/02/23  1614 05/02/23  1613 05/01/23  0946 05/01/23  0011 05/01/23  0010 04/30/23  2326   BLOOD CULTURE   --   --   --   --   --   --  No Growth at 72 hrs  No Growth at 72 hrs     GRAM STAIN RESULT  1+ Polys  No bacteria seen No Polys or Bacteria seen No Polys or Bacteria seen  --   --   --   --    MRSA CULTURE ONLY   --   --   --   --   --  No Methicillin Resistant Staphlyococcus aureus (MRSA) isolated  --    LEGIONELLA URINARY ANTIGEN   --   --   --   --  Negative  --   --    C DIFF TOXIN B BY PCR   --   --   --  Positive*  --   --   --      Results from last 7 days   Lab Units 05/04/23  0612 05/03/23  0530 05/02/23  0501 05/01/23  2834 04/30/23  1539   PROCALCITONIN ng/ml 0 57* 0 74* 0 73* 0 59* 0 28*     Results from last 7 days   Lab Units 05/03/23  0530 04/30/23  1539   CRP mg/L 276 9* 121 6*               Imaging Studies:   I have personally reviewed pertinent imaging study reports and images in PACS  Other Studies:   I have personally reviewed pertinent reports including several BAL prelim/pending cx

## 2023-05-04 NOTE — PLAN OF CARE
Problem: INFECTION - ADULT  Goal: Absence or prevention of progression during hospitalization  Description: INTERVENTIONS:  - Assess and monitor for signs and symptoms of infection  - Monitor lab/diagnostic results  - Monitor all insertion sites, i e  indwelling lines, tubes, and drains  - Monitor endotracheal if appropriate and nasal secretions for changes in amount and color  - Baker appropriate cooling/warming therapies per order  - Administer medications as ordered  - Instruct and encourage patient and family to use good hand hygiene technique  - Identify and instruct in appropriate isolation precautions for identified infection/condition  Outcome: Progressing  Goal: Absence of fever/infection during neutropenic period  Description: INTERVENTIONS:  - Monitor WBC    Outcome: Progressing     Problem: CARDIOVASCULAR - ADULT  Goal: Maintains optimal cardiac output and hemodynamic stability  Description: INTERVENTIONS:  - Monitor I/O, vital signs and rhythm  - Monitor for S/S and trends of decreased cardiac output  - Administer and titrate ordered vasoactive medications to optimize hemodynamic stability  - Assess quality of pulses, skin color and temperature  - Assess for signs of decreased coronary artery perfusion  - Instruct patient to report change in severity of symptoms  Outcome: Progressing  Goal: Absence of cardiac dysrhythmias or at baseline rhythm  Description: INTERVENTIONS:  - Continuous cardiac monitoring, vital signs, obtain 12 lead EKG if ordered  - Administer antiarrhythmic and heart rate control medications as ordered  - Monitor electrolytes and administer replacement therapy as ordered  Outcome: Progressing     Problem: RESPIRATORY - ADULT  Goal: Achieves optimal ventilation and oxygenation  Description: INTERVENTIONS:  - Assess for changes in respiratory status  - Assess for changes in mentation and behavior  - Position to facilitate oxygenation and minimize respiratory effort  - Oxygen administered by appropriate delivery if ordered  - Initiate smoking cessation education as indicated  - Encourage broncho-pulmonary hygiene including cough, deep breathe, Incentive Spirometry  - Assess the need for suctioning and aspirate as needed  - Assess and instruct to report SOB or any respiratory difficulty  - Respiratory Therapy support as indicated  Outcome: Progressing     Problem: GASTROINTESTINAL - ADULT  Goal: Minimal or absence of nausea and/or vomiting  Description: INTERVENTIONS:  - Administer IV fluids if ordered to ensure adequate hydration  - Maintain NPO status until nausea and vomiting are resolved  - Nasogastric tube if ordered  - Administer ordered antiemetic medications as needed  - Provide nonpharmacologic comfort measures as appropriate  - Advance diet as tolerated, if ordered  - Consider nutrition services referral to assist patient with adequate nutrition and appropriate food choices  Outcome: Progressing  Goal: Maintains or returns to baseline bowel function  Description: INTERVENTIONS:  - Assess bowel function  - Encourage oral fluids to ensure adequate hydration  - Administer IV fluids if ordered to ensure adequate hydration  - Administer ordered medications as needed  - Encourage mobilization and activity  - Consider nutritional services referral to assist patient with adequate nutrition and appropriate food choices  Outcome: Progressing  Goal: Maintains adequate nutritional intake  Description: INTERVENTIONS:  - Monitor percentage of each meal consumed  - Identify factors contributing to decreased intake, treat as appropriate  - Assist with meals as needed  - Monitor I&O, weight, and lab values if indicated  - Obtain nutrition services referral as needed  Outcome: Progressing  Goal: Establish and maintain optimal ostomy function  Description: INTERVENTIONS:  - Assess bowel function  - Encourage oral fluids to ensure adequate hydration  - Administer IV fluids if ordered to ensure adequate hydration   - Administer ordered medications as needed  - Encourage mobilization and activity  - Nutrition services referral to assist patient with appropriate food choices  - Assess stoma site  - Consider wound care consult   Outcome: Progressing  Goal: Oral mucous membranes remain intact  Description: INTERVENTIONS  - Assess oral mucosa and hygiene practices  - Implement preventative oral hygiene regimen  - Implement oral medicated treatments as ordered  - Initiate Nutrition services referral as needed  Outcome: Progressing     Problem: METABOLIC, FLUID AND ELECTROLYTES - ADULT  Goal: Electrolytes maintained within normal limits  Description: INTERVENTIONS:  - Monitor labs and assess patient for signs and symptoms of electrolyte imbalances  - Administer electrolyte replacement as ordered  - Monitor response to electrolyte replacements, including repeat lab results as appropriate  - Instruct patient on fluid and nutrition as appropriate  Outcome: Progressing  Goal: Fluid balance maintained  Description: INTERVENTIONS:  - Monitor labs   - Monitor I/O and WT  - Instruct patient on fluid and nutrition as appropriate  - Assess for signs & symptoms of volume excess or deficit  Outcome: Progressing  Goal: Glucose maintained within target range  Description: INTERVENTIONS:  - Monitor Blood Glucose as ordered  - Assess for signs and symptoms of hyperglycemia and hypoglycemia  - Administer ordered medications to maintain glucose within target range  - Assess nutritional intake and initiate nutrition service referral as needed  Outcome: Progressing

## 2023-05-05 NOTE — PROGRESS NOTES
[address] [place] [address]  Progress Note  Name: Jefe Dawkins  MRN: [id number]  Unit/Bed#: ICU 07 I Date of Admission: 4/30/2023   Date of Service: 5/5/2023 I Hospital Day: 5    Assessment/Plan   * Acute respiratory failure with hypoxia [facility])  Assessment & Plan  Presented with nonproductive cough, chest tightness, shortness of breath, generalized weakness  Recent admission for diverticulitis  On arrival:   • VS: afebrile, normotensive, tachycardia, tachypnea, new o2 on 3L progressed to midflow  • Labs: CBC chronic leukocytosis, BMP hypokalemia/hypomagnesemia, lactate 2 4>1 1, BNP 26, trop wnl   • Imaging: CTA C/A/P: no PE, new bilateral GGO, scattered pulmonary/liver/inguinal nodules, osseous metastasis   • Initially on 3LNC, progressed to midflow on floor, monitor for progression to HFNC/Bipap  Lungs sound clear without wheezing/crackles  Does not examine volume overloaded  5/1:  Increased O2 requirement this AM, desat to 70s  Improved with 15L NC  Bronchoscopy performed and patient transferred to the ICU for BiPAP, alternating FiO2 down now on HFNC  Plan:   • Continue supplemental O2, maintain O2 sat> 92%  Currently on 13L NC, wean as able  • ABX: Day#4  Continue cefepime 2 g q 8 hrs   • ID consulted and discontinued azithromycin and vancomycin  • Pneumocystis, histoplasma, aspergillus, fungitell assay pending  F/u results  • Pneumocystis positive as of 5/3  • ID started IV Bactrim 380 mg q 6 hrs  • Diet: regular  • Lines/Drains/Tubes: peripheral IV  • S/P BAL; follow-up with results      Pneumocystis carinii pneumonia (HCC)  Assessment & Plan  Positive bronchial lavage culture     - Bactrim q6h   - See assessment and plan above       Ground glass opacity present on imaging of lung  Assessment & Plan  New bilateral lung ground glass opacities/infiltrates  Past smoking history  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, 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   Denies history of pulmonary disease    Urine legionella/strep negative  Procal 0 28->0 59  Diffdx: interstitial pneumonia, pneumonitis, ILD, metastatic disease   Pneumocystic jirovecii positive   Bronchial culture and gram stain negative   BC negative at 72h        Plan:  • Afebrile, chronic leukocytosis in setting of steroids, viral panel negative   • Check procal/CRP although unreliable in malignancy   • Check sputum G/S & Cx, RP2  • Pulmonary hygiene with IS, duoneb, mucinex, tessalon   • Pulmonology consulted; transferred to ICU  • F/U with BAL results       Personal history of malignant melanoma  Assessment & Plan  Known malignant melanoma, completion of whole brain radiation, briefly started on treatment with encorafenib/binimetinib  Follows with outpatient heme/onc, Dr Roshan Mosley  Pending follow-up radiation therapy        Watery diarrhea  Assessment & Plan  Hx of diverticulitis, C  Diff in the past  Remains with intermittent episodes of watery diarrhea but improving today  Without diverticulitis on CT scan     Plan:  • Prior C  Diff PCR positive but toxins negative, probable colonization  • C  Diff ordered, positive toxin on PCR in the setting of prior history of C  Dif  • Monitor with use of antibiotics   • Started on C  Diff prophylaxis with oral vancomycin 125 mg BID     Autoimmune hemolytic anemia  Assessment & Plan  History of warm antibody hemolytic anemia, 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, monitored by outpatient Heme/onc    Plan:  • Previously treated with Rituxan   • Restarted on dexamethasone, will continue   • Trend H/H, Bili       History of pulmonary embolism  Assessment & Plan  No acute PE noted this admission, maintained on Pradaxa          ICU Core Measures     A: Assess, Prevent, and Manage Pain · Has pain been assessed? NA  · Need for changes to pain regimen? NA   B: Both SAT/SAT  · N/A   C: Choice of Sedation · RASS Goal: 0 Alert and Calm  · Need for changes to sedation or analgesia regimen?  NA   D: Delirium · CAM-ICU: Negative   E: Early Mobility  · Plan for early mobility? Yes   F: Family Engagement · Plan for family engagement today? Yes       Antibiotic Review: Patient on appropriate coverage based on culture data  , Awaiting culture results  and Infectious disease consulted      Prophylaxis:  VTE VTE covered by:  dabigatran etexilate, Oral, 150 mg at 05/04/23 2139       Stress Ulcer  covered bypantoprazole (PROTONIX) EC tablet 40 mg [[id number]]       Subjective   HPI/24hr events:  No overnight events  Patient started and continued on bactrim for PCP  Patient was on high flow overnight, saturation above 94%  Currently on 15L via NC  Patient denies SOB, chest pain, chest tightness or cough at the moment  Review of Systems   Respiratory: Negative for cough, chest tightness and shortness of breath  Cardiovascular: Negative for chest pain, palpitations and leg swelling  Gastrointestinal: Negative for abdominal pain, blood in stool, diarrhea and nausea  All other systems reviewed and are negative  Objective                            Vitals I/O      Most Recent Min/Max in 24hrs   Temp 97 8 °F (36 6 °C) Temp  Min: 97 4 °F (36 3 °C)  Max: 98 1 °F (36 7 °C)   Pulse 72 Pulse  Min: 70  Max: 152   Resp 17 Resp  Min: 14  Max: 27   /77 BP  Min: 112/64  Max: 148/80   O2 Sat 99 % SpO2  Min: 84 %  Max: 99 %      Intake/Output Summary (Last 24 hours) at 5/5/2023 0640  Last data filed at 5/4/2023 2318  Gross per 24 hour   Intake 1980 ml   Output 1550 ml   Net 430 ml         Diet Regular; Regular House     Invasive Monitoring Physical exam    Physical Exam  Constitutional:       General: He is not in acute distress  Appearance: He is obese  He is ill-appearing  He is not toxic-appearing or diaphoretic  HENT:      Head: Normocephalic and atraumatic  Right Ear: External ear normal       Left Ear: External ear normal       Nose: Nose normal       Mouth/Throat:      Mouth: Mucous membranes are dry  Pharynx: Oropharynx is clear  Eyes:      Extraocular Movements: Extraocular movements intact  Cardiovascular:      Rate and Rhythm: Normal rate and regular rhythm  Pulses: Normal pulses  Heart sounds: Normal heart sounds  Pulmonary:      Effort: Pulmonary effort is normal  No respiratory distress  Breath sounds: Normal breath sounds  No stridor  No wheezing or rhonchi  Comments: 15L, Sat 95%   Abdominal:      General: Abdomen is flat  Bowel sounds are normal  There is no distension  Palpations: Abdomen is soft  There is no mass  Musculoskeletal:         General: Normal range of motion  Cervical back: Normal range of motion  Right lower leg: No edema  Left lower leg: No edema  Neurological:      General: No focal deficit present  Mental Status: He is alert and oriented to person, place, and time  Diagnostic Studies      PE Study with CT Abdomen and Pelvis with contrast   Final Result by Chanelle Pitts MD (04/30 2027)   1  No pulmonary artery emboli  2   New bilateral lung groundglass opacities and infiltrates, likely inflammatory/infectious  3  Scattered small lung nodules again visualized, suspicious for metastases  4  Scattered liver lesions again visualized, most concerning for metastases  5  Multiple intra-abdominal and right inguinal nodules, likely metastatic  6  Known osseous metastases better demonstrated on prior PET/CT  Workstation performed: [id number]         XR chest 1 view portable   Final Result by Tomy Vicente MD (05/01 0846)      Patchy groundglass infiltrates bilaterally, worse from the prior exam  This could be related to atypical pneumonia versus edema           Workstation performed: [id number]                Medications:  Scheduled PRN   carbamide peroxide, 5 drop, BID  dabigatran etexilate, 150 mg, Q12H RAVIN  dexamethasone, 4 mg, Q8H  hydrochlorothiazide, 25 mg, Daily  magnesium sulfate, 2 g, Once  memantine, 10 mg, BID  metoprolol succinate, 12 5 mg, Daily  pantoprazole, 40 mg, Early Morning  prazosin, 1 mg, HS  sulfamethoxazole-trimethoprim, 15 mg/kg/day of trimethoprim, Q6H  vancomycin oral, 125 mg, Q12H RAVIN      acetaminophen, 650 mg, Q6H PRN  albuterol, 2 puff, Q4H PRN  ALPRAZolam, 0 5 mg, HS PRN  aluminum-magnesium hydroxide-simethicone, 30 mL, Q6H PRN  benzonatate, 100 mg, TID PRN  dextromethorphan-guaiFENesin, 10 mL, Q4H PRN  ondansetron, 4 mg, Q6H PRN  polyethylene glycol, 17 g, Daily PRN       Continuous          Labs:    CBC    Recent Labs     05/04/23 0612 05/05/23 0333   WBC 20 10* 19 10*   HGB 10 3* 9 9*   HCT 30 4* 29 3*    301     BMP    Recent Labs     05/04/23 0612 05/05/23  0333   SODIUM 132* 128*   K 3 3* 3 7   CL 97 94*   CO2 26 25   AGAP 9 9   BUN 19 16   CREATININE 1 13 1 27   CALCIUM 8 5 8 5       Coags    No recent results     Additional Electrolytes  Recent Labs     05/04/23  0612 05/05/23  0333 05/05/23  0337   MG 1 5* 1 5*  --    PHOS 3 0  --   --    CAIONIZED  --   --  1 04*          Blood Gas    No recent results  No recent results LFTs  Recent Labs     05/04/23 0612   ALT 17   AST 20   ALKPHOS 51   ALB 2 8*   TBILI 0 46       Infectious  Recent Labs     05/04/23  0612   PROCALCITONI 0 57*     Glucose  Recent Labs     05/04/23  0612 05/05/23  0333   GLUC 99 132               Critical Care Time Delivered:   Please see attending attestation         Makenzie Singer MD

## 2023-05-05 NOTE — ASSESSMENT & PLAN NOTE
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

## 2023-05-05 NOTE — ASSESSMENT & PLAN NOTE
Presented with nonproductive cough, chest tightness, shortness of breath, generalized weakness  Recent admission for diverticulitis  On arrival:   • VS: afebrile, normotensive, tachycardia, tachypnea, new o2 on 3L progressed to midflow  • Labs: CBC chronic leukocytosis, BMP hypokalemia/hypomagnesemia, lactate 2 4>1 1, BNP 26, trop wnl   • Imaging: CTA C/A/P: no PE, new bilateral GGO, scattered pulmonary/liver/inguinal nodules, osseous metastasis   • Initially on 3LNC, progressed to midflow on floor, monitor for progression to HFNC/Bipap  Lungs sound clear without wheezing/crackles  Does not examine volume overloaded  5/1:  Increased O2 requirement this AM, desat to 70s  Improved with 15L NC  Bronchoscopy performed and patient transferred to the ICU for BiPAP, alternating FiO2 down now on HFNC  Plan:   • Continue supplemental O2, maintain O2 sat> 92%  Currently on 13L NC, wean as able  • ABX: Day#4  Continue cefepime 2 g q 8 hrs   • ID consulted and discontinued azithromycin and vancomycin  • Pneumocystis, histoplasma, aspergillus, fungitell assay pending   F/u results  • Pneumocystis positive as of 5/3  • ID started IV Bactrim 380 mg q 6 hrs  • Diet: regular  • Lines/Drains/Tubes: peripheral IV  • S/P BAL; follow-up with results

## 2023-05-05 NOTE — PLAN OF CARE
Problem: Potential for Falls  Goal: Patient will remain free of falls  Description: INTERVENTIONS:  - Educate patient/family on patient safety including physical limitations  - Instruct patient to call for assistance with activity   - Consult OT/PT to assist with strengthening/mobility   - Keep Call bell within reach  - Keep bed low and locked with side rails adjusted as appropriate  - Keep care items and personal belongings within reach  - Initiate and maintain comfort rounds  - Make Fall Risk Sign visible to staff  - Offer Toileting in advance of need  - Initiate/Maintain bed alarm  - Obtain necessary fall risk management equipment  - Apply yellow socks and bracelet for high fall risk patients  - Consider moving patient to room near nurses station  Outcome: Progressing     Problem: MOBILITY - ADULT  Goal: Maintain or return to baseline ADL function  Description: INTERVENTIONS:  -  Assess patient's ability to carry out ADLs; assess patient's baseline for ADL function and identify physical deficits which impact ability to perform ADLs (bathing, care of mouth/teeth, toileting, grooming, dressing, etc )  - Assess/evaluate cause of self-care deficits   - Assess range of motion  - Assess patient's mobility; develop plan if impaired  - Assess patient's need for assistive devices and provide as appropriate  - Encourage maximum independence but intervene and supervise when necessary  - Involve family in performance of ADLs  - Assess for home care needs following discharge   - Consider OT consult to assist with ADL evaluation and planning for discharge  - Provide patient education as appropriate  Outcome: Progressing  Goal: Maintains/Returns to pre admission functional level  Description: INTERVENTIONS:  - Perform BMAT or MOVE assessment daily    - Set and communicate daily mobility goal to care team and patient/family/caregiver     - Collaborate with rehabilitation services on mobility goals if consulted  - Out of bed for toileting  - Record patient progress and toleration of activity level   Outcome: Progressing     Problem: INFECTION - ADULT  Goal: Absence or prevention of progression during hospitalization  Description: INTERVENTIONS:  - Assess and monitor for signs and symptoms of infection  - Monitor lab/diagnostic results  - Monitor all insertion sites, i e  indwelling lines, tubes, and drains  - Monitor endotracheal if appropriate and nasal secretions for changes in amount and color  - [place] appropriate cooling/warming therapies per order  - Administer medications as ordered  - Instruct and encourage patient and family to use good hand hygiene technique  - Identify and instruct in appropriate isolation precautions for identified infection/condition  Outcome: Progressing  Goal: Absence of fever/infection during neutropenic period  Description: INTERVENTIONS:  - Monitor WBC    Outcome: Progressing     Problem: SAFETY ADULT  Goal: Patient will remain free of falls  Description: INTERVENTIONS:  - Educate patient/family on patient safety including physical limitations  - Instruct patient to call for assistance with activity   - Consult OT/PT to assist with strengthening/mobility   - Keep Call bell within reach  - Keep bed low and locked with side rails adjusted as appropriate  - Keep care items and personal belongings within reach  - Initiate and maintain comfort rounds  - Make Fall Risk Sign visible to staff  - Offer Toileting in advance of need  - Initiate/Maintain bed alarm  - Obtain necessary fall risk management equipment  - Apply yellow socks and bracelet for high fall risk patients  - Consider moving patient to room near nurses station  Outcome: Progressing  Goal: Maintain or return to baseline ADL function  Description: INTERVENTIONS:  -  Assess patient's ability to carry out ADLs; assess patient's baseline for ADL function and identify physical deficits which impact ability to perform ADLs (bathing, care of mouth/teeth, toileting, grooming, dressing, etc )  - Assess/evaluate cause of self-care deficits   - Assess range of motion  - Assess patient's mobility; develop plan if impaired  - Assess patient's need for assistive devices and provide as appropriate  - Encourage maximum independence but intervene and supervise when necessary  - Involve family in performance of ADLs  - Assess for home care needs following discharge   - Consider OT consult to assist with ADL evaluation and planning for discharge  - Provide patient education as appropriate  Outcome: Progressing  Goal: Maintains/Returns to pre admission functional level  Description: INTERVENTIONS:  - Perform BMAT or MOVE assessment daily    - Set and communicate daily mobility goal to care team and patient/family/caregiver     - Collaborate with rehabilitation services on mobility goals if consulted  - Out of bed for toileting  - Record patient progress and toleration of activity level   Outcome: Progressing     Problem: CARDIOVASCULAR - ADULT  Goal: Maintains optimal cardiac output and hemodynamic stability  Description: INTERVENTIONS:  - Monitor I/O, vital signs and rhythm  - Monitor for S/S and trends of decreased cardiac output  - Administer and titrate ordered vasoactive medications to optimize hemodynamic stability  - Assess quality of pulses, skin color and temperature  - Assess for signs of decreased coronary artery perfusion  - Instruct patient to report change in severity of symptoms  Outcome: Progressing  Goal: Absence of cardiac dysrhythmias or at baseline rhythm  Description: INTERVENTIONS:  - Continuous cardiac monitoring, vital signs, obtain 12 lead EKG if ordered  - Administer antiarrhythmic and heart rate control medications as ordered  - Monitor electrolytes and administer replacement therapy as ordered  Outcome: Progressing     Problem: RESPIRATORY - ADULT  Goal: Achieves optimal ventilation and oxygenation  Description: INTERVENTIONS:  - Assess for changes in respiratory status  - Assess for changes in mentation and behavior  - Position to facilitate oxygenation and minimize respiratory effort  - Oxygen administered by appropriate delivery if ordered  - Initiate smoking cessation education as indicated  - Encourage broncho-pulmonary hygiene including cough, deep breathe, Incentive Spirometry  - Assess the need for suctioning and aspirate as needed  - Assess and instruct to report SOB or any respiratory difficulty  - Respiratory Therapy support as indicated  Outcome: Progressing     Problem: GASTROINTESTINAL - ADULT  Goal: Minimal or absence of nausea and/or vomiting  Description: INTERVENTIONS:  - Administer IV fluids if ordered to ensure adequate hydration  - Maintain NPO status until nausea and vomiting are resolved  - Nasogastric tube if ordered  - Administer ordered antiemetic medications as needed  - Provide nonpharmacologic comfort measures as appropriate  - Advance diet as tolerated, if ordered  - Consider nutrition services referral to assist patient with adequate nutrition and appropriate food choices  Outcome: Progressing  Goal: Maintains or returns to baseline bowel function  Description: INTERVENTIONS:  - Assess bowel function  - Encourage oral fluids to ensure adequate hydration  - Administer IV fluids if ordered to ensure adequate hydration  - Administer ordered medications as needed  - Encourage mobilization and activity  - Consider nutritional services referral to assist patient with adequate nutrition and appropriate food choices  Outcome: Progressing     Problem: METABOLIC, FLUID AND ELECTROLYTES - ADULT  Goal: Electrolytes maintained within normal limits  Description: INTERVENTIONS:  - Monitor labs and assess patient for signs and symptoms of electrolyte imbalances  - Administer electrolyte replacement as ordered  - Monitor response to electrolyte replacements, including repeat lab results as appropriate  - Instruct patient on fluid and nutrition as appropriate  Outcome: Progressing  Goal: Fluid balance maintained  Description: INTERVENTIONS:  - Monitor labs   - Monitor I/O and WT  - Instruct patient on fluid and nutrition as appropriate  - Assess for signs & symptoms of volume excess or deficit  Outcome: Progressing     Problem: Prexisting or High Potential for Compromised Skin Integrity  Goal: Skin integrity is maintained or improved  Description: INTERVENTIONS:  - Identify patients at risk for skin breakdown  - Assess and monitor skin integrity  - Assess and monitor nutrition and hydration status  - Monitor labs   - Assess for incontinence   - Turn and reposition patient  - Assist with mobility/ambulation  - Relieve pressure over bony prominences  - Avoid friction and shearing  - Provide appropriate hygiene as needed including keeping skin clean and dry  - Evaluate need for skin moisturizer/barrier cream  - Collaborate with interdisciplinary team   - Patient/family teaching  - Consider wound care consult   Outcome: Progressing

## 2023-05-05 NOTE — ASSESSMENT & PLAN NOTE
Known malignant melanoma, completion of whole brain radiation, briefly started on treatment with encorafenib/binimetinib  Follows with outpatient heme/onc, Dr Marques Perez     Pending follow-up radiation therapy

## 2023-05-05 NOTE — CASE MANAGEMENT
Case Management Discharge Planning Note    Patient name Kimberley Chavis  Location ICU 07/ICU 28 MRN [id number]  : 1955 Date 2023       Current Admission Date: 2023  Current Admission Diagnosis:Acute respiratory failure with hypoxia [facility])   Patient Active Problem List    Diagnosis Date Noted   • Pneumocystis carinii pneumonia ([address] ) 2023   • Ground glass opacity present on imaging of lung 2023   • Generalized weakness 2023   • Watery diarrhea 2023   • Melanoma ([address] ) 2023   • Encounter for follow-up surveillance of melanoma 2023   • Lymphocele after surgical procedure 10/06/2022   • Elevated serum creatinine 2022   • Elevated bilirubin 2022   • Leukocytosis 2022   • Dyspnea 2022   • Acute respiratory failure with hypoxia ([facility] ) 2022   • Personal history of malignant melanoma 2022   • Hypercholesterolemia 2021   • Chronic bilateral low back pain with bilateral sciatica 10/08/2021   • Hyperglycemia 2021   • Primary localized osteoarthrosis of the knee, right 2021   • Thrombocytosis 2021   • COVID-19 2020   • Pain in joint, lower leg 11/10/2020   • Primary osteoarthritis of left knee 2020   • Pain in left knee 2020   • Auto immune neutropenia ([facility]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   • Glucose intolerance (impaired glucose tolerance) 2020   • Renal insufficiency 2020   • Essential hypertension 2019   • Posttraumatic stress disorder 10/28/2019   • Iron overload due to repeated red blood cell transfusions 2018   • Sepsis ([address] ) 2018   • Autoimmune hemolytic anemia    • Shortness of breath 2017   • Gastroesophageal reflux disease 2017   • Elevated blood pressure reading without diagnosis of hypertension 2017   • Portal vein thrombosis 2017   • History of pulmonary embolism 2017   • Splenomegaly 2017   • Symptomatic anemia 2017   • 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   • Hyperbilirubinemia 11/02/2016      LOS (days): 5  Geometric Mean LOS (GMLOS) (days): 5 20  Days to GMLOS:0 7     OBJECTIVE:  Risk of Unplanned Readmission Score: 24 87         Current admission status: Inpatient   Preferred Pharmacy:   [id number] - [address] [facility] [address] [place] [address] [address] [place] [address]  Phone: [phone] Fax: [phone]    Primary Care Provider: Darren Gamboa DO    Primary Insurance: MEDICARE  Secondary Insurance: A[employer]    DISCHARGE DETAILS:                                                                                                 Additional Comments: PT/OT recommending STR  CM spoke with wife via phone to review recommendation  Wife agreeable to blanket referrals being sent for STR  Referrals placed in [facility]  CM dept to follow

## 2023-05-05 NOTE — PROGRESS NOTES
Progress Note - [facility] Infectious Disease   Jone Ryan 70 y o  male MRN: [id number]  Unit/Bed#: ICU 07 Encounter: [id number]    IMPRESSION & RECOMMENDATIONS:   1  Pneumocystis pneumonia  CXR and CT chest show new bilateral GGO  Consider infectious vs inflammatory etiologies in this chronically immunosuppressed patient  PJP DFA from BAL positive  RP2 negative and COVID19 negative  Patient lived in [place] for 15 years, thus consider possibility of TB, though no other RF for such  Pulmonology following and he is s/p bronchoscopy for BAL 5/1/23 with evidence of cloudy secretions  O2 sats now improving    -will switch to PO Bactrim 2 DS QID today   -continue adjunctive steroids with chronic decadron  -following treatment, will start secondary prophylaxis while on lifelong steroids   -will need outpatient ID follow up upon discharge; will notify office when closer to d/c  -follow up BAL cx including legionella, AFB, histoplasma Ag and aspergillus Ag, TB PCR, and BAL cytology   -follow-up blood cultures   -monitor CBCD, temperature and hemodynamics  -TB airborne isolation not required from ID standpoint      2  Acute respiratory failure with hypoxia  This is presumed secondary to #1  Patient continues to have tenuous respiratory status  He is [address] in ICU at this interval     -work up as above  -close pulmonology follow up  -continue respiratory protocol and pulmonary toileting      3  Leukocytosis  Suspect some component of steroid induced leukocytosis  Blood cultures are negative  He is afebrile  He is hemodynamically stable    -follow up blood cultures  -monitor CBCd, temperature and hemodynamics       4  Hx of malignant melanoma, mets to brain, lung, liver, LN, bone  S/p whole brain radiation  Briefly started on treatment with encorafenib/binimetinib and was supposed to restart on 4/3 but now on hold due to recent acute illness   Follows with outpatient heme/onc, Dr Miranda    -close onc follow up      5  Chronic diarrhea  Known hx of C diff in the past  Recent admission for diverticulitis  CT A/P negative  C  Diff PCR positive with negative EIA  Likely colonized  -continue PPX dosing PO vanco while on systemic abx      6  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, 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  Follows outpatient heme/onc  Previously treated with Rituxan and recently restarted on dexamethasone for #3  Follows Dr Malena Katz  -monitor CBC     7  Hx PE on Pradaxa  CTA chest negative for VTE  We will see patient again 23 if here  Please call ID on call physician in meantime if questions  Antibiotics:  SMX TMP D2  PO vanco ppx     I have discussed the above management plan in detail with patient  I have discussed the above management plan in detail with patient's RN, and the primary service, CCU resident  24 Hour events:  Yesterday and overnight notes reviewed  Switched to [facility]  Subjective:  Patient has no fever, chills, sweats; no nausea, vomiting, diarrhea; no cough, cough and SOB improving  Able to take deeper breaths  Appetite improving  Tolerating the bactrim  Objective:  Vitals:  Temp:  [97 4 °F (36 3 °C)-98 1 °F (36 7 °C)] 97 9 °F (36 6 °C)  HR:  [] 76  Resp:  [12-27] 12  BP: (116-148)/(63-83) 139/83  SpO2:  [84 %-99 %] 96 %  Temp (24hrs), Av 8 °F (36 6 °C), Min:97 4 °F (36 3 °C), Max:98 1 °F (36 7 °C)  Current: Temperature: 97 9 °F (36 6 °C)    PHYSICAL EXAM:  General Appearance:  Appearing chronically ill and globally weak, though nontoxic, and in no distress   HEENT: Normocephalic, without obvious abnormality, atraumatic  Conjunctiva pink and sclera anicteric  Oropharynx moist without lesions  Lungs:   Decreased at bases though overal improved respiratory reserve with improved air movement, no r/r/w  satting 93% on [facility] 15L  No conversational dyspnea     Heart:  RRR; no murmur, rub or gallop   Abdomen:   Soft, non-tender, non-distended, positive bowel sounds Extremities: No cyanosis, clubbing or edema   Skin: No rashes or lesions  No draining wounds noted  Peripheral IV intact without evidence of erythema, warmth, or exudate  LABS, IMAGING, & OTHER STUDIES:  Extensive review of the medical records in epic including review of the notes, radiographs, and laboratory results were reviewed personally as below  Lab Results:  I have personally reviewed pertinent labs  Results from last 7 days   Lab Units 05/05/23  0333 05/04/23  0612 05/03/23  0530   WBC Thousand/uL 19 10* 20 10* 22 20*   HEMOGLOBIN g/dL 9 9* 10 3* 9 0*   PLATELETS Thousands/uL 301 268 358     Results from last 7 days   Lab Units 05/05/23  0333 05/04/23  0612 05/03/23  0530 05/02/23  0501   SODIUM mmol/L 128* 132* 134* 134*   POTASSIUM mmol/L 3 7 3 3* 3 8 3 6   CHLORIDE mmol/L 94* 97 100 101   CO2 mmol/L 25 26 27 26   BUN mg/dL 16 19 23 26*   CREATININE mg/dL 1 27 1 13 1 08 1 19   EGFR ml/min/1 73sq m 57 65 69 61   CALCIUM mg/dL 8 5 8 5 8 5 8 2*   AST U/L  --  20 27 25   ALT U/L  --  17 20 23   ALK PHOS U/L  --  51 52 53     Results from last 7 days   Lab Units 05/02/23  1615 05/02/23  1614 05/02/23  1613 05/01/23  0946 05/01/23  0011 05/01/23  0010 04/30/23  2326   BLOOD CULTURE   --   --   --   --   --   --  No Growth After 4 Days  No Growth After 4 Days     GRAM STAIN RESULT  1+ Polys  No bacteria seen No Polys or Bacteria seen No Polys or Bacteria seen  --   --   --   --    MRSA CULTURE ONLY   --   --   --   --   --  No Methicillin Resistant Staphlyococcus aureus (MRSA) isolated  --    LEGIONELLA URINARY ANTIGEN   --   --   --   --  Negative  --   --    C DIFF TOXIN B BY PCR   --   --   --  Positive*  --   --   --      Results from last 7 days   Lab Units 05/04/23  0612 05/03/23  0530 05/02/23  0501 05/01/23  0520 04/30/23  1539   PROCALCITONIN ng/ml 0 57* 0 74* 0 73* 0 59* 0 28*     Results from last 7 days   Lab Units 05/03/23  0530 04/30/23  1539   CRP mg/L 276 9* 121 6* Imaging Studies:   I have personally reviewed pertinent imaging study reports and images in PACS  Other Studies:   I have personally reviewed pertinent report including BAL cx

## 2023-05-06 PROBLEM — C43.9 METASTATIC MELANOMA (HCC): Status: ACTIVE | Noted: 2022-01-01

## 2023-05-06 PROBLEM — Z86.19 HISTORY OF CLOSTRIDIUM DIFFICILE INFECTION: Status: ACTIVE | Noted: 2023-01-01

## 2023-05-06 NOTE — PLAN OF CARE
Problem: INFECTION - ADULT  Goal: Absence or prevention of progression during hospitalization  Description: INTERVENTIONS:  - Assess and monitor for signs and symptoms of infection  - Monitor lab/diagnostic results  - Monitor all insertion sites, i e  indwelling lines, tubes, and drains  - Monitor endotracheal if appropriate and nasal secretions for changes in amount and color  - [place] appropriate cooling/warming therapies per order  - Administer medications as ordered  - Instruct and encourage patient and family to use good hand hygiene technique  - Identify and instruct in appropriate isolation precautions for identified infection/condition  Outcome: Progressing  Goal: Absence of fever/infection during neutropenic period  Description: INTERVENTIONS:  - Monitor WBC    Outcome: Progressing     Problem: CARDIOVASCULAR - ADULT  Goal: Maintains optimal cardiac output and hemodynamic stability  Description: INTERVENTIONS:  - Monitor I/O, vital signs and rhythm  - Monitor for S/S and trends of decreased cardiac output  - Administer and titrate ordered vasoactive medications to optimize hemodynamic stability  - Assess quality of pulses, skin color and temperature  - Assess for signs of decreased coronary artery perfusion  - Instruct patient to report change in severity of symptoms  Outcome: Progressing  Goal: Absence of cardiac dysrhythmias or at baseline rhythm  Description: INTERVENTIONS:  - Continuous cardiac monitoring, vital signs, obtain 12 lead EKG if ordered  - Administer antiarrhythmic and heart rate control medications as ordered  - Monitor electrolytes and administer replacement therapy as ordered  Outcome: Progressing     Problem: GASTROINTESTINAL - ADULT  Goal: Minimal or absence of nausea and/or vomiting  Description: INTERVENTIONS:  - Administer IV fluids if ordered to ensure adequate hydration  - Maintain NPO status until nausea and vomiting are resolved  - Nasogastric tube if ordered  - Administer ordered antiemetic medications as needed  - Provide nonpharmacologic comfort measures as appropriate  - Advance diet as tolerated, if ordered  - Consider nutrition services referral to assist patient with adequate nutrition and appropriate food choices  Outcome: Progressing  Goal: Maintains or returns to baseline bowel function  Description: INTERVENTIONS:  - Assess bowel function  - Encourage oral fluids to ensure adequate hydration  - Administer IV fluids if ordered to ensure adequate hydration  - Administer ordered medications as needed  - Encourage mobilization and activity  - Consider nutritional services referral to assist patient with adequate nutrition and appropriate food choices  Outcome: Progressing  Goal: Maintains adequate nutritional intake  Description: INTERVENTIONS:  - Monitor percentage of each meal consumed  - Identify factors contributing to decreased intake, treat as appropriate  - Assist with meals as needed  - Monitor I&O, weight, and lab values if indicated  - Obtain nutrition services referral as needed  Outcome: Progressing  Goal: Establish and maintain optimal ostomy function  Description: INTERVENTIONS:  - Assess bowel function  - Encourage oral fluids to ensure adequate hydration  - Administer IV fluids if ordered to ensure adequate hydration   - Administer ordered medications as needed  - Encourage mobilization and activity  - Nutrition services referral to assist patient with appropriate food choices  - Assess stoma site  - Consider wound care consult   Outcome: Progressing  Goal: Oral mucous membranes remain intact  Description: INTERVENTIONS  - Assess oral mucosa and hygiene practices  - Implement preventative oral hygiene regimen  - Implement oral medicated treatments as ordered  - Initiate Nutrition services referral as needed  Outcome: Progressing     Problem: METABOLIC, FLUID AND ELECTROLYTES - ADULT  Goal: Electrolytes maintained within normal limits  Description: INTERVENTIONS:  - Monitor labs and assess patient for signs and symptoms of electrolyte imbalances  - Administer electrolyte replacement as ordered  - Monitor response to electrolyte replacements, including repeat lab results as appropriate  - Instruct patient on fluid and nutrition as appropriate  Outcome: Progressing  Goal: Fluid balance maintained  Description: INTERVENTIONS:  - Monitor labs   - Monitor I/O and WT  - Instruct patient on fluid and nutrition as appropriate  - Assess for signs & symptoms of volume excess or deficit  Outcome: Progressing  Goal: Glucose maintained within target range  Description: INTERVENTIONS:  - Monitor Blood Glucose as ordered  - Assess for signs and symptoms of hyperglycemia and hypoglycemia  - Administer ordered medications to maintain glucose within target range  - Assess nutritional intake and initiate nutrition service referral as needed  Outcome: Progressing

## 2023-05-06 NOTE — ASSESSMENT & PLAN NOTE
Positive BAL for Pneumocystis carinii pneumonia  Risk factors in the includes steroid use in the setting of metastatic malignancy and possible BRAF therapy  · Antibiotic management per infectious disease  Previously on IV TMP-SMX and steroids, now on p o  Bactrim to complete 21-day course through 5/24/2023  · Monitor BMP and CMP  · Infectious disease recommending 21 days of adjunctive corticosteroids for his hypoxia  Patient currently on chronic Decadron which they state will be sufficient for this purpose

## 2023-05-06 NOTE — PROGRESS NOTES
[address] [place] [address]  Progress Note  Name: Yonny Jaimes  MRN: [id number]  Unit/Bed#: ICU 07 I Date of Admission: 4/30/2023   Date of Service: 5/6/2023 I Hospital Day: 6    Assessment/Plan   * Acute respiratory failure with hypoxia [facility])  Assessment & Plan  65-year-old male presenting with cough, chest tightness, shortness of breath, and generalized weakness  He had acute respiratory failure with hypoxia requiring ICU level of care  Bronchoscopy was performed last 5/1/2023  Rest of the studies are still pending  He is positive for PCP pneumonia  • Wean off as tolerated    Pneumocystis carinii pneumonia ([facility] [address] )  Assessment & Plan  Positive BAL for Pneumocystis carinii pneumonia  Risk factors in the includes steroid use in the setting of metastatic malignancy and possible BRAF therapy  · Antibiotic management per infectious disease  Previously on IV TMP-SMX and steroids, now on p o  Bactrim to complete 21-day course through 5/24/2023  · Monitor BMP and CMP  · Infectious disease recommending 21 days of adjunctive corticosteroids for his hypoxia  Patient currently on chronic Decadron which they state will be sufficient for this purpose  Autoimmune hemolytic anemia  Assessment & Plan  History of warm antibody hemolytic anemia  Previously treated with Rituxan (8/2022 last received)  • Continue chronic steroid treatment with Dexamethasone 4mg Q8 hours  • Appreciate hematology-oncology recommendations  History of pulmonary embolism  Assessment & Plan  Maintained on Pradaxa    Ground glass opacity present on imaging of lung  Assessment & Plan  New bilateral lung ground glass opacities/infiltrates  Past smoking history  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, 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   Denies history of pulmonary disease     Urine legionella/strep negative  Procal 0 28->0 59  Diffdx: interstitial pneumonia, pneumonitis, ILD, metastatic disease        Plan:  • Afebrile, chronic leukocytosis in setting of steroids, viral panel negative   • Check procal/CRP although unreliable in malignancy   • Check sputum G/S & Cx, RP2  • Pulmonary hygiene with IS, duoneb, mucinex, tessalon   • Consult: pulmonary     History of Clostridium difficile infection  Assessment & Plan  Hx of diverticulitis, C  Diff in the past  C  Diff PCR positive, but negative toxins  • Continue po vancomycin prophylaxis      Metastatic melanoma [facility])  Assessment & Plan  Biopsy confirmed 3/17/2023  Right peritoneum consistent with malignant melanoma with brain mets  BRAF mutation  · Encorafenib and Binimetinib started 2023 (Currently on hold)           VTE Pharmacologic Prophylaxis:   Pharmacologic: Dabigatran (Pradaxa)  Mechanical VTE Prophylaxis in Place: Yes    Discussions with Specialists or Other Care Team Provider: nursing    Education and Discussions with Family / Patient: patient, Rabia Guerrero    Current Length of Stay: 6 day(s)    Current Patient Status: Inpatient   Certification Statement: The patient will continue to require additional inpatient hospital stay due to antibiotics for pcp pneumonia, hypoxia    Discharge Plan: active    Code Status: Level 1 - Full Code      Subjective:   Patient seen and examined  Reports that feeling better today  Objective:     Vitals:   Temp (24hrs), Av 1 °F (36 7 °C), Min:98 °F (36 7 °C), Max:98 2 °F (36 8 °C)    Temp:  [98 °F (36 7 °C)-98 2 °F (36 8 °C)] 98 2 °F (36 8 °C)  HR:  [64-92] 68  Resp:  [12-23] 18  BP: (124-147)/(73-90) 128/78  SpO2:  [86 %-99 %] 93 %  Body mass index is 29 36 kg/m²  Input and Output Summary (last 24 hours): Intake/Output Summary (Last 24 hours) at 2023 1105  Last data filed at 2023 1000  Gross per 24 hour   Intake 240 ml   Output 1200 ml   Net -960 ml       Physical Exam:     Physical Exam  Vitals reviewed  Constitutional:       General: He is not in acute distress  HENT:      Head: Normocephalic        Nose: Nose normal       Mouth/Throat: Mouth: Mucous membranes are moist    Eyes:      General: No scleral icterus  Cardiovascular:      Rate and Rhythm: Normal rate and regular rhythm  Pulmonary:      Effort: Pulmonary effort is normal  No respiratory distress  Breath sounds: Rales present  No wheezing  Abdominal:      General: There is no distension  Palpations: Abdomen is soft  Tenderness: There is no abdominal tenderness  Skin:     General: Skin is warm  Neurological:      Mental Status: He is alert and oriented to person, place, and time  Psychiatric:         Mood and Affect: Mood normal          Behavior: Behavior normal        Additional Data:     Labs:    Results from last 7 days   Lab Units 05/06/23  0540 05/05/23  0333 05/04/23  0612 05/02/23  0501 04/30/23  1539   WBC Thousand/uL 19 84*   < > 20 10*   < > 17 87*   HEMOGLOBIN g/dL 10 0*   < > 10 3*   < > 11 9*   HEMATOCRIT % 29 0*   < > 30 4*   < > 35 8*   PLATELETS Thousands/uL 389   < > 268   < > 378   BANDS PCT %  --   --   --   --  1   NEUTROS PCT %  --   --  88*  --   --    LYMPHS PCT %  --   --  7*  --   --    LYMPHO PCT %  --   --   --   --  7*   MONOS PCT %  --   --  3*  --   --    MONO PCT %  --   --   --   --  2*   EOS PCT %  --   --  1  --  3    < > = values in this interval not displayed  Results from last 7 days   Lab Units 05/06/23  0540 05/05/23  0333 05/04/23  0612   SODIUM mmol/L 129*   < > 132*   POTASSIUM mmol/L 3 9   < > 3 3*   CHLORIDE mmol/L 95*   < > 97   CO2 mmol/L 25   < > 26   BUN mg/dL 16   < > 19   CREATININE mg/dL 1 30   < > 1 13   ANION GAP mmol/L 9   < > 9   CALCIUM mg/dL 8 6   < > 8 5   ALBUMIN g/dL  --   --  2 8*   TOTAL BILIRUBIN mg/dL  --   --  0 46   ALK PHOS U/L  --   --  51   ALT U/L  --   --  17   AST U/L  --   --  20   GLUCOSE RANDOM mg/dL 87   < > 99    < > = values in this interval not displayed           Results from last 7 days   Lab Units 05/02/23  1651 05/02/23  1109 05/02/23  0804   POC GLUCOSE mg/dl 89 133 119 Results from last 7 days   Lab Units 05/04/23  0612 05/03/23  0530 05/02/23  0501 05/01/23  0520 04/30/23  1849 04/30/23  1539   LACTIC ACID mmol/L  --   --   --   --  1 1 2 4*   PROCALCITONIN ng/ml 0 57* 0 74* 0 73* 0 59*  --  0 28*           * I Have Reviewed All Lab Data Listed Above  * Additional Pertinent Lab Tests Reviewed: All [place] Admission Reviewed      Lines:   Invasive Devices     Peripheral Intravenous Line  Duration           Peripheral IV 05/03/23 Right;Ventral (anterior) Forearm 3 days                   Imaging:    Imaging Reports Reviewed Today Include: cta chest abdomen pelvis, imaging since admission 4/30    Recent Cultures (last 7 days):     Results from last 7 days   Lab Units 05/02/23  1615 05/02/23  1614 05/02/23  1613 05/01/23  0946 05/01/23  0011 04/30/23  2326   BLOOD CULTURE   --   --   --   --   --  No Growth After 5 Days  No Growth After 5 Days     GRAM STAIN RESULT  1+ Polys  No bacteria seen No Polys or Bacteria seen No Polys or Bacteria seen  --   --   --    LEGIONELLA URINARY ANTIGEN   --   --   --   --  Negative  --    C DIFF TOXIN B BY PCR   --   --   --  Positive*  --   --        Last 24 Hours Medication List:   Current Facility-Administered Medications   Medication Dose Route Frequency Provider Last Rate   • acetaminophen  650 mg Oral Q6H PRN Ngozi Colon MD     • albuterol  2 puff Inhalation Q4H PRN Ngozi Colon MD     • ALPRAZolam  0 5 mg Oral HS PRN Adolfo Broussard MD     • aluminum-magnesium hydroxide-simethicone  30 mL Oral Q6H PRN Ngozi Colon MD     • benzonatate  100 mg Oral TID PRN Ngozi Colon MD     • carbamide peroxide  5 drop Both Ears BID Hanxiong Claudetta Lente, DO     • dabigatran etexilate  150 mg Oral Q12H [address] Ngozi Colon MD     • dexamethasone  4 mg Oral Q8H Ngozi Colon MD     • dextromethorphan-guaiFENesin  10 mL Oral Q4H PRN Ngozi Colon MD     • hydrochlorothiazide  25 mg Oral Daily Mohit Bowles MD     • memantine  10 mg Oral BID Mohit Bowles MD     • metoprolol succinate  12 5 mg Oral Daily Mohit Bowles MD     • ondansetron  4 mg Intravenous Q6H PRN Mohit Bowles MD     • pantoprazole  40 mg Oral Early Morning Mohit Bowles MD     • polyethylene glycol  17 g Oral Daily PRN Mohit Bowles MD     • prazosin  1 mg Oral HS Mohit Bowles MD     • sulfamethoxazole-trimethoprim  2 tablet Oral Q6H oKfi Sierra PA-C          Today, Patient Was Seen By: Tristian Contreras MD    ** Please Note: Dictation voice to text software may have been used in the creation of this document   **

## 2023-05-06 NOTE — PLAN OF CARE
Problem: Potential for Falls  Goal: Patient will remain free of falls  Description: INTERVENTIONS:  - Educate patient/family on patient safety including physical limitations  - Instruct patient to call for assistance with activity   - Consult OT/PT to assist with strengthening/mobility   - Keep Call bell within reach  - Keep bed low and locked with side rails adjusted as appropriate  - Keep care items and personal belongings within reach  - Initiate and maintain comfort rounds  - Make Fall Risk Sign visible to staff  - Offer Toileting in advance of need  - Initiate/Maintain bed alarm  - Obtain necessary fall risk management equipment  - Apply yellow socks and bracelet for high fall risk patients  - Consider moving patient to room near nurses station  Outcome: Progressing     Problem: MOBILITY - ADULT  Goal: Maintain or return to baseline ADL function  Description: INTERVENTIONS:  -  Assess patient's ability to carry out ADLs; assess patient's baseline for ADL function and identify physical deficits which impact ability to perform ADLs (bathing, care of mouth/teeth, toileting, grooming, dressing, etc )  - Assess/evaluate cause of self-care deficits   - Assess range of motion  - Assess patient's mobility; develop plan if impaired  - Assess patient's need for assistive devices and provide as appropriate  - Encourage maximum independence but intervene and supervise when necessary  - Involve family in performance of ADLs  - Assess for home care needs following discharge   - Consider OT consult to assist with ADL evaluation and planning for discharge  - Provide patient education as appropriate  Outcome: Progressing  Goal: Maintains/Returns to pre admission functional level  Description: INTERVENTIONS:  - Perform BMAT or MOVE assessment daily    - Set and communicate daily mobility goal to care team and patient/family/caregiver     - Collaborate with rehabilitation services on mobility goals if consulted  - Out of bed for toileting  - Record patient progress and toleration of activity level   Outcome: Progressing     Problem: INFECTION - ADULT  Goal: Absence or prevention of progression during hospitalization  Description: INTERVENTIONS:  - Assess and monitor for signs and symptoms of infection  - Monitor lab/diagnostic results  - Monitor all insertion sites, i e  indwelling lines, tubes, and drains  - Monitor endotracheal if appropriate and nasal secretions for changes in amount and color  - [place] appropriate cooling/warming therapies per order  - Administer medications as ordered  - Instruct and encourage patient and family to use good hand hygiene technique  - Identify and instruct in appropriate isolation precautions for identified infection/condition  Outcome: Progressing  Goal: Absence of fever/infection during neutropenic period  Description: INTERVENTIONS:  - Monitor WBC    Outcome: Progressing     Problem: SAFETY ADULT  Goal: Patient will remain free of falls  Description: INTERVENTIONS:  - Educate patient/family on patient safety including physical limitations  - Instruct patient to call for assistance with activity   - Consult OT/PT to assist with strengthening/mobility   - Keep Call bell within reach  - Keep bed low and locked with side rails adjusted as appropriate  - Keep care items and personal belongings within reach  - Initiate and maintain comfort rounds  - Make Fall Risk Sign visible to staff  - Offer Toileting in advance of need  - Initiate/Maintain bed alarm  - Obtain necessary fall risk management equipment  - Apply yellow socks and bracelet for high fall risk patients  - Consider moving patient to room near nurses station  Outcome: Progressing  Goal: Maintain or return to baseline ADL function  Description: INTERVENTIONS:  -  Assess patient's ability to carry out ADLs; assess patient's baseline for ADL function and identify physical deficits which impact ability to perform ADLs (bathing, care of mouth/teeth, toileting, grooming, dressing, etc )  - Assess/evaluate cause of self-care deficits   - Assess range of motion  - Assess patient's mobility; develop plan if impaired  - Assess patient's need for assistive devices and provide as appropriate  - Encourage maximum independence but intervene and supervise when necessary  - Involve family in performance of ADLs  - Assess for home care needs following discharge   - Consider OT consult to assist with ADL evaluation and planning for discharge  - Provide patient education as appropriate  Outcome: Progressing  Goal: Maintains/Returns to pre admission functional level  Description: INTERVENTIONS:  - Perform BMAT or MOVE assessment daily    - Set and communicate daily mobility goal to care team and patient/family/caregiver     - Collaborate with rehabilitation services on mobility goals if consulted  - Out of bed for toileting  - Record patient progress and toleration of activity level   Outcome: Progressing     Problem: CARDIOVASCULAR - ADULT  Goal: Maintains optimal cardiac output and hemodynamic stability  Description: INTERVENTIONS:  - Monitor I/O, vital signs and rhythm  - Monitor for S/S and trends of decreased cardiac output  - Administer and titrate ordered vasoactive medications to optimize hemodynamic stability  - Assess quality of pulses, skin color and temperature  - Assess for signs of decreased coronary artery perfusion  - Instruct patient to report change in severity of symptoms  Outcome: Progressing  Goal: Absence of cardiac dysrhythmias or at baseline rhythm  Description: INTERVENTIONS:  - Continuous cardiac monitoring, vital signs, obtain 12 lead EKG if ordered  - Administer antiarrhythmic and heart rate control medications as ordered  - Monitor electrolytes and administer replacement therapy as ordered  Outcome: Progressing     Problem: RESPIRATORY - ADULT  Goal: Achieves optimal ventilation and oxygenation  Description: INTERVENTIONS:  - Assess for changes in respiratory status  - Assess for changes in mentation and behavior  - Position to facilitate oxygenation and minimize respiratory effort  - Oxygen administered by appropriate delivery if ordered  - Initiate smoking cessation education as indicated  - Encourage broncho-pulmonary hygiene including cough, deep breathe, Incentive Spirometry  - Assess the need for suctioning and aspirate as needed  - Assess and instruct to report SOB or any respiratory difficulty  - Respiratory Therapy support as indicated  Outcome: Progressing     Problem: GASTROINTESTINAL - ADULT  Goal: Maintains or returns to baseline bowel function  Description: INTERVENTIONS:  - Assess bowel function  - Encourage oral fluids to ensure adequate hydration  - Administer IV fluids if ordered to ensure adequate hydration  - Administer ordered medications as needed  - Encourage mobilization and activity  - Consider nutritional services referral to assist patient with adequate nutrition and appropriate food choices  Outcome: Progressing     Problem: METABOLIC, FLUID AND ELECTROLYTES - ADULT  Goal: Electrolytes maintained within normal limits  Description: INTERVENTIONS:  - Monitor labs and assess patient for signs and symptoms of electrolyte imbalances  - Administer electrolyte replacement as ordered  - Monitor response to electrolyte replacements, including repeat lab results as appropriate  - Instruct patient on fluid and nutrition as appropriate  Outcome: Progressing     Problem: Prexisting or High Potential for Compromised Skin Integrity  Goal: Skin integrity is maintained or improved  Description: INTERVENTIONS:  - Identify patients at risk for skin breakdown  - Assess and monitor skin integrity  - Assess and monitor nutrition and hydration status  - Monitor labs   - Assess for incontinence   - Turn and reposition patient  - Assist with mobility/ambulation  - Relieve pressure over bony prominences  - Avoid friction and shearing  - Provide appropriate hygiene as needed including keeping skin clean and dry  - Evaluate need for skin moisturizer/barrier cream  - Collaborate with interdisciplinary team   - Patient/family teaching  - Consider wound care consult   Outcome: Progressing

## 2023-05-06 NOTE — PROGRESS NOTES
"Progress Note - Pulmonary   Coral Reyes 70 y o  male MRN: [id number]  Unit/Bed#: ICU 07 Encounter: [id number]      Assessment:  1  Acute hypoxic respiratory failure  2  Pneumocystis Jirovecii pneumonia  · Positive BAL DFA at lingula/RML  3  Multiple pulmonary nodules  4  Immunosuppressive state  5  Hx metastatic melanoma-to the brain, likely to the lungs  6  Former tobacco abuse/active smokeless tobacco abuse  7  Class I obesity  8  Hx PE/DVT  9  Autoimmune hemolytic anemia    Plan:  · Improving clinically, continue to wean FiO2 for SPO2 above 90%  · Negative BAL cytology for malignancy, NGTD bronchial cultures, off cefepime since 5/4  · Bactrim started 5/3, plan for 21 days therapy switch to oral yesterday  · Continue current dose of dexamethasone 4 mg every 8, for hemolytic anemia and enough to cover his for PCP pneumonia  · Appreciate ID inputs    Pulmonary will follow on Monday, please do not hesitate to call with any questions    Subjective:   Cough is nearly resolved, still intermittent with throat clearing  No dyspnea at rest or minimal exertion  Down to 6 LPM supplemental oxygen    Vitals: Blood pressure 138/85, pulse 68, temperature 98 °F (36 7 °C), temperature source Oral, resp  rate (!) 11, height 5' 8\" (1 727 m), weight 87 6 kg (193 lb 2 oz), SpO2 97 %  , Body mass index is 29 36 kg/m²  Intake/Output Summary (Last 24 hours) at 5/6/2023 1337  Last data filed at 5/6/2023 1000  Gross per 24 hour   Intake 240 ml   Output 900 ml   Net -660 ml       Physical Exam  Gen: not in acute distress, obese, Body mass index is 29 36 kg/m²  HEENT:supple, no accessory muscle use, no JVD appreciated  Cardiac: RRR, no murmurs appreciated  Lungs: normal respiratory effort, mild/fine rhonchi  Abdomen: soft, nontender, normoactive bowel sounds  Extremities: Mild peripheral edema  Neuro: AAO X3, no focal motor deficit    Labs: I have personally reviewed pertinent lab results          [address]  [facility], " MD

## 2023-05-07 NOTE — PROGRESS NOTES
[address] [place] [address]  Progress Note  Name: Victor Hugo Powell  MRN: [id number]  Unit/Bed#: ICU 07 I Date of Admission: 4/30/2023   Date of Service: 5/7/2023 I Hospital Day: 7    Assessment/Plan   * Acute respiratory failure with hypoxia [facility])  Assessment & Plan  69-year-old male presenting with cough, chest tightness, shortness of breath, and generalized weakness likely secondary to Pneomocystis Carinii Pneumonia (Diagnosed through bronchoscopy, BAL positive for this)  • Wean off as tolerated  • Pending studies: Histoplasma antigen, Aspergillus antigen, TB PCR, Cytology    Pneumocystis carinii pneumonia ([facility] [address] )  Assessment & Plan  Positive BAL for Pneumocystis carinii pneumonia  Risk factors in the includes steroid use in the setting of metastatic malignancy and possible BRAF therapy  · Antibiotic management per infectious disease  Previously on IV TMP-SMX and steroids, now on p o  Bactrim to complete 21-day course through 5/24/2023  · Monitor BMP and CMP  · Infectious disease recommending 21 days of adjunctive corticosteroids for his hypoxia  Patient currently on chronic Decadron which they state will be sufficient for this purpose  Autoimmune hemolytic anemia  Assessment & Plan  History of warm antibody hemolytic anemia  Previously treated with Rituxan (8/2022 last received)  • Continue chronic steroid treatment with Dexamethasone 4mg Q8 hours  • Appreciate hematology-oncology recommendations  History of pulmonary embolism  Assessment & Plan  Maintained on Pradaxa    History of Clostridium difficile infection  Assessment & Plan  Hx of diverticulitis, C  Diff in the past  C  Diff PCR positive, but negative toxins  • Continue po vancomycin prophylaxis      Metastatic melanoma [facility])  Assessment & Plan  Biopsy confirmed 3/17/2023  Right peritoneum consistent with malignant melanoma with brain mets  BRAF mutation    · Encorafenib and Binimetinib started April 2023 (Currently on hold) VTE Pharmacologic Prophylaxis:   Pharmacologic: Dabigatran (Pradaxa)  Mechanical VTE Prophylaxis in Place: Yes    Discussions with Specialists or Other Care Team Provider: nursing    Education and Discussions with Family / Patient: patient    Current Length of Stay: 7 day(s)    Current Patient Status: Inpatient   Certification Statement: The patient will continue to require additional inpatient hospital stay due to steroids, hypoxia, pulmonary and ID reevaluation      Discharge Plan: 24-48 hours    Code Status: Level 1 - Full Code      Subjective:   Patient seen and examined at bedside  States that he is feeling very weak today  But is interested in physical therapy and Occupational Therapy to get stronger  Objective:     Vitals:   Temp (24hrs), Av °F (36 7 °C), Min:97 8 °F (36 6 °C), Max:98 2 °F (36 8 °C)    Temp:  [97 8 °F (36 6 °C)-98 2 °F (36 8 °C)] 97 8 °F (36 6 °C)  HR:  [66-92] 79  Resp:  [11-22] 15  BP: ()/(52-86) 134/86  SpO2:  [88 %-98 %] 97 %  Body mass index is 29 36 kg/m²  Input and Output Summary (last 24 hours): Intake/Output Summary (Last 24 hours) at 2023 1013  Last data filed at 2023 0000  Gross per 24 hour   Intake 240 ml   Output 900 ml   Net -660 ml       Physical Exam:     Physical Exam  Vitals reviewed  HENT:      Head: Normocephalic  Nose: Nose normal       Mouth/Throat:      Mouth: Mucous membranes are moist    Eyes:      General: No scleral icterus  Cardiovascular:      Rate and Rhythm: Normal rate  Pulmonary:      Effort: Pulmonary effort is normal  No respiratory distress  Breath sounds: Rhonchi present  No wheezing  Abdominal:      General: There is no distension  Palpations: Abdomen is soft  Tenderness: There is no abdominal tenderness  Skin:     General: Skin is warm  Neurological:      Mental Status: He is alert and oriented to person, place, and time     Psychiatric:         Mood and Affect: Mood normal  Behavior: Behavior normal        Additional Data:     Labs:    Results from last 7 days   Lab Units 05/07/23  0519 05/02/23  0501 04/30/23  1539   WBC Thousand/uL 17 26*   < > 17 87*   HEMOGLOBIN g/dL 10 6*   < > 11 9*   HEMATOCRIT % 31 3*   < > 35 8*   PLATELETS Thousands/uL 434*   < > 378   BANDS PCT %  --   --  1   NEUTROS PCT % 80*   < >  --    LYMPHS PCT % 11*   < >  --    LYMPHO PCT %  --   --  7*   MONOS PCT % 7   < >  --    MONO PCT %  --   --  2*   EOS PCT % 0   < > 3    < > = values in this interval not displayed  Results from last 7 days   Lab Units 05/07/23  0519 05/05/23  0333 05/04/23  0612   SODIUM mmol/L 127*   < > 132*   POTASSIUM mmol/L 4 2   < > 3 3*   CHLORIDE mmol/L 94*   < > 97   CO2 mmol/L 25   < > 26   BUN mg/dL 20   < > 19   CREATININE mg/dL 1 38*   < > 1 13   ANION GAP mmol/L 8   < > 9   CALCIUM mg/dL 9 1   < > 8 5   ALBUMIN g/dL  --   --  2 8*   TOTAL BILIRUBIN mg/dL  --   --  0 46   ALK PHOS U/L  --   --  51   ALT U/L  --   --  17   AST U/L  --   --  20   GLUCOSE RANDOM mg/dL 84   < > 99    < > = values in this interval not displayed  Results from last 7 days   Lab Units 05/02/23  1651 05/02/23  1109 05/02/23  0804   POC GLUCOSE mg/dl 89 133 119         Results from last 7 days   Lab Units 05/04/23  0612 05/03/23  0530 05/02/23  0501 05/01/23  0520 04/30/23  1849 04/30/23  1539   LACTIC ACID mmol/L  --   --   --   --  1 1 2 4*   PROCALCITONIN ng/ml 0 57* 0 74* 0 73* 0 59*  --  0 28*           * I Have Reviewed All Lab Data Listed Above  * Additional Pertinent Lab Tests Reviewed:  [address] Admission Reviewed      Lines:   Invasive Devices     Peripheral Intravenous Line  Duration           Peripheral IV 05/07/23 Left;Ventral (anterior) Forearm <1 day                   Imaging:    Imaging Reports Reviewed Today Include: imaging since admission    Recent Cultures (last 7 days):     Results from last 7 days   Lab Units 05/02/23  1615 05/02/23  1616 05/02/23  1613 05/01/23  0946 05/01/23  0011 04/30/23  2326   BLOOD CULTURE   --   --   --   --   --  No Growth After 5 Days  No Growth After 5 Days  GRAM STAIN RESULT  1+ Polys  No bacteria seen No Polys or Bacteria seen No Polys or Bacteria seen  --   --   --    LEGIONELLA URINARY ANTIGEN   --   --   --   --  Negative  --    C DIFF TOXIN B BY PCR   --   --   --  Positive*  --   --        Last 24 Hours Medication List:   Current Facility-Administered Medications   Medication Dose Route Frequency Provider Last Rate   • acetaminophen  650 mg Oral Q6H PRN Alisia Devlin MD     • albuterol  2 puff Inhalation Q4H PRN Alisia Devlin MD     • ALPRAZolam  0 5 mg Oral HS PRN Agatha Santos MD     • aluminum-magnesium hydroxide-simethicone  30 mL Oral Q6H PRN Alisia Devlin MD     • benzonatate  100 mg Oral TID PRN Alisia Devlin MD     • carbamide peroxide  5 drop Both Ears BID Lorraine Toledo DO     • dabigatran etexilate  150 mg Oral Q12H [address] Alisia Devlin MD     • dexamethasone  4 mg Oral Q8H Alisia Devlin MD     • dextromethorphan-guaiFENesin  10 mL Oral Q4H PRN Alisia Devlin MD     • hydrochlorothiazide  25 mg Oral Daily Alisia Devlin MD     • memantine  10 mg Oral BID Alisia Devlin MD     • metoprolol succinate  12 5 mg Oral Daily Alisia Devlin MD     • ondansetron  4 mg Intravenous Q6H PRN Alisia Devlin MD     • pantoprazole  40 mg Oral Early Morning Alisia Devlin MD     • polyethylene glycol  17 g Oral Daily PRN Alisia Devlin MD     • prazosin  1 mg Oral HS Alisia Devlin MD     • sulfamethoxazole-trimethoprim  2 tablet Oral Q6H Kofi Sierra PA-C          Today, Patient Was Seen By: Ludmila Arredondo MD    ** Please Note: Dictation voice to text software may have been used in the creation of this document   **

## 2023-05-07 NOTE — PLAN OF CARE
Problem: RESPIRATORY - ADULT  Goal: Achieves optimal ventilation and oxygenation  Description: INTERVENTIONS:  - Assess for changes in respiratory status  - Assess for changes in mentation and behavior  - Position to facilitate oxygenation and minimize respiratory effort  - Oxygen administered by appropriate delivery if ordered  - Initiate smoking cessation education as indicated  - Encourage broncho-pulmonary hygiene including cough, deep breathe, Incentive Spirometry  - Assess the need for suctioning and aspirate as needed  - Assess and instruct to report SOB or any respiratory difficulty  - Respiratory Therapy support as indicated  Outcome: Progressing     Problem: GASTROINTESTINAL - ADULT  Goal: Minimal or absence of nausea and/or vomiting  Description: INTERVENTIONS:  - Administer IV fluids if ordered to ensure adequate hydration  - Maintain NPO status until nausea and vomiting are resolved  - Nasogastric tube if ordered  - Administer ordered antiemetic medications as needed  - Provide nonpharmacologic comfort measures as appropriate  - Advance diet as tolerated, if ordered  - Consider nutrition services referral to assist patient with adequate nutrition and appropriate food choices  Outcome: Progressing  Goal: Maintains or returns to baseline bowel function  Description: INTERVENTIONS:  - Assess bowel function  - Encourage oral fluids to ensure adequate hydration  - Administer IV fluids if ordered to ensure adequate hydration  - Administer ordered medications as needed  - Encourage mobilization and activity  - Consider nutritional services referral to assist patient with adequate nutrition and appropriate food choices  Outcome: Progressing  Goal: Maintains adequate nutritional intake  Description: INTERVENTIONS:  - Monitor percentage of each meal consumed  - Identify factors contributing to decreased intake, treat as appropriate  - Assist with meals as needed  - Monitor I&O, weight, and lab values if indicated  - Obtain nutrition services referral as needed  Outcome: Progressing  Goal: Establish and maintain optimal ostomy function  Description: INTERVENTIONS:  - Assess bowel function  - Encourage oral fluids to ensure adequate hydration  - Administer IV fluids if ordered to ensure adequate hydration   - Administer ordered medications as needed  - Encourage mobilization and activity  - Nutrition services referral to assist patient with appropriate food choices  - Assess stoma site  - Consider wound care consult   Outcome: Progressing  Goal: Oral mucous membranes remain intact  Description: INTERVENTIONS  - Assess oral mucosa and hygiene practices  - Implement preventative oral hygiene regimen  - Implement oral medicated treatments as ordered  - Initiate Nutrition services referral as needed  Outcome: Progressing     Problem: METABOLIC, FLUID AND ELECTROLYTES - ADULT  Goal: Electrolytes maintained within normal limits  Description: INTERVENTIONS:  - Monitor labs and assess patient for signs and symptoms of electrolyte imbalances  - Administer electrolyte replacement as ordered  - Monitor response to electrolyte replacements, including repeat lab results as appropriate  - Instruct patient on fluid and nutrition as appropriate  Outcome: Progressing  Goal: Fluid balance maintained  Description: INTERVENTIONS:  - Monitor labs   - Monitor I/O and WT  - Instruct patient on fluid and nutrition as appropriate  - Assess for signs & symptoms of volume excess or deficit  Outcome: Progressing  Goal: Glucose maintained within target range  Description: INTERVENTIONS:  - Monitor Blood Glucose as ordered  - Assess for signs and symptoms of hyperglycemia and hypoglycemia  - Administer ordered medications to maintain glucose within target range  - Assess nutritional intake and initiate nutrition service referral as needed  Outcome: Progressing     Problem: Prexisting or High Potential for Compromised Skin Integrity  Goal: Skin integrity is maintained or improved  Description: INTERVENTIONS:  - Identify patients at risk for skin breakdown  - Assess and monitor skin integrity  - Assess and monitor nutrition and hydration status  - Monitor labs   - Assess for incontinence   - Turn and reposition patient  - Assist with mobility/ambulation  - Relieve pressure over bony prominences  - Avoid friction and shearing  - Provide appropriate hygiene as needed including keeping skin clean and dry  - Evaluate need for skin moisturizer/barrier cream  - Collaborate with interdisciplinary team   - Patient/family teaching  - Consider wound care consult   Outcome: Progressing

## 2023-05-07 NOTE — PLAN OF CARE
Problem: Potential for Falls  Goal: Patient will remain free of falls  Description: INTERVENTIONS:  - Educate patient/family on patient safety including physical limitations  - Instruct patient to call for assistance with activity   - Consult OT/PT to assist with strengthening/mobility   - Keep Call bell within reach  - Keep bed low and locked with side rails adjusted as appropriate  - Keep care items and personal belongings within reach  - Initiate and maintain comfort rounds  - Make Fall Risk Sign visible to staff  - Offer Toileting every 2 Hours, in advance of need  - Initiate/Maintain bed alarm  - Obtain necessary fall risk management equipment: gripper socks, call bell  - Apply yellow socks and bracelet for high fall risk patients  - Consider moving patient to room near nurses station  Outcome: Progressing     Problem: MOBILITY - ADULT  Goal: Maintain or return to baseline ADL function  Description: INTERVENTIONS:  -  Assess patient's ability to carry out ADLs; assess patient's baseline for ADL function and identify physical deficits which impact ability to perform ADLs (bathing, care of mouth/teeth, toileting, grooming, dressing, etc )  - Assess/evaluate cause of self-care deficits   - Assess range of motion  - Assess patient's mobility; develop plan if impaired  - Assess patient's need for assistive devices and provide as appropriate  - Encourage maximum independence but intervene and supervise when necessary  - Involve family in performance of ADLs  - Assess for home care needs following discharge   - Consider OT consult to assist with ADL evaluation and planning for discharge  - Provide patient education as appropriate  Outcome: Progressing     Problem: INFECTION - ADULT  Goal: Absence or prevention of progression during hospitalization  Description: INTERVENTIONS:  - Assess and monitor for signs and symptoms of infection  - Monitor lab/diagnostic results  - Monitor all insertion sites, i e  indwelling lines, tubes, and drains  - Monitor endotracheal if appropriate and nasal secretions for changes in amount and color  - [place] appropriate cooling/warming therapies per order  - Administer medications as ordered  - Instruct and encourage patient and family to use good hand hygiene technique  - Identify and instruct in appropriate isolation precautions for identified infection/condition  Outcome: Progressing  Goal: Maintain or return to baseline ADL function  Description: INTERVENTIONS:  -  Assess patient's ability to carry out ADLs; assess patient's baseline for ADL function and identify physical deficits which impact ability to perform ADLs (bathing, care of mouth/teeth, toileting, grooming, dressing, etc )  - Assess/evaluate cause of self-care deficits   - Assess range of motion  - Assess patient's mobility; develop plan if impaired  - Assess patient's need for assistive devices and provide as appropriate  - Encourage maximum independence but intervene and supervise when necessary  - Involve family in performance of ADLs  - Assess for home care needs following discharge   - Consider OT consult to assist with ADL evaluation and planning for discharge  - Provide patient education as appropriate  Outcome: Progressing     Problem: CARDIOVASCULAR - ADULT  Goal: Maintains optimal cardiac output and hemodynamic stability  Description: INTERVENTIONS:  - Monitor I/O, vital signs and rhythm  - Monitor for S/S and trends of decreased cardiac output  - Administer and titrate ordered vasoactive medications to optimize hemodynamic stability  - Assess quality of pulses, skin color and temperature  - Assess for signs of decreased coronary artery perfusion  - Instruct patient to report change in severity of symptoms  Outcome: Progressing     Problem: RESPIRATORY - ADULT  Goal: Achieves optimal ventilation and oxygenation  Description: INTERVENTIONS:  - Assess for changes in respiratory status  - Assess for changes in mentation and behavior  - Position to facilitate oxygenation and minimize respiratory effort  - Oxygen administered by appropriate delivery if ordered  - Initiate smoking cessation education as indicated  - Encourage broncho-pulmonary hygiene including cough, deep breathe, Incentive Spirometry  - Assess the need for suctioning and aspirate as needed  - Assess and instruct to report SOB or any respiratory difficulty  - Respiratory Therapy support as indicated  Outcome: Progressing

## 2023-05-07 NOTE — PLAN OF CARE
Problem: Potential for Falls  Goal: Patient will remain free of falls  Description: INTERVENTIONS:  - Educate patient/family on patient safety including physical limitations  - Instruct patient to call for assistance with activity   - Consult OT/PT to assist with strengthening/mobility   - Keep Call bell within reach  - Keep bed low and locked with side rails adjusted as appropriate  - Keep care items and personal belongings within reach  - Initiate and maintain comfort rounds  - Make Fall Risk Sign visible to staff  - Offer Toileting every 2 Hours, in advance of need  - Initiate/Maintain bed alarm  - Obtain necessary fall risk management equipment  - Apply yellow socks and bracelet for high fall risk patients  - Consider moving patient to room near nurses station  Outcome: Progressing     Problem: MOBILITY - ADULT  Goal: Maintain or return to baseline ADL function  Description: INTERVENTIONS:  -  Assess patient's ability to carry out ADLs; assess patient's baseline for ADL function and identify physical deficits which impact ability to perform ADLs (bathing, care of mouth/teeth, toileting, grooming, dressing, etc )  - Assess/evaluate cause of self-care deficits   - Assess range of motion  - Assess patient's mobility; develop plan if impaired  - Assess patient's need for assistive devices and provide as appropriate  - Encourage maximum independence but intervene and supervise when necessary  - Involve family in performance of ADLs  - Assess for home care needs following discharge   - Consider OT consult to assist with ADL evaluation and planning for discharge  - Provide patient education as appropriate  Outcome: Progressing  Goal: Maintains/Returns to pre admission functional level  Description: INTERVENTIONS:  - Perform BMAT or MOVE assessment daily    - Set and communicate daily mobility goal to care team and patient/family/caregiver     - Collaborate with rehabilitation services on mobility goals if consulted  - Perform Range of Motion 3 times a day  - Reposition patient every 2 hours    - Dangle patient 3 times a day  - Stand patient 3 times a day  - Record patient progress and toleration of activity level   Outcome: Progressing     Problem: INFECTION - ADULT  Goal: Absence or prevention of progression during hospitalization  Description: INTERVENTIONS:  - Assess and monitor for signs and symptoms of infection  - Monitor lab/diagnostic results  - Monitor all insertion sites, i e  indwelling lines, tubes, and drains  - Monitor endotracheal if appropriate and nasal secretions for changes in amount and color  - Hankamer appropriate cooling/warming therapies per order  - Administer medications as ordered  - Instruct and encourage patient and family to use good hand hygiene technique  - Identify and instruct in appropriate isolation precautions for identified infection/condition  Outcome: Progressing  Goal: Absence of fever/infection during neutropenic period  Description: INTERVENTIONS:  - Monitor WBC    Outcome: Progressing     Problem: SAFETY ADULT  Goal: Patient will remain free of falls  Description: INTERVENTIONS:  - Educate patient/family on patient safety including physical limitations  - Instruct patient to call for assistance with activity   - Consult OT/PT to assist with strengthening/mobility   - Keep Call bell within reach  - Keep bed low and locked with side rails adjusted as appropriate  - Keep care items and personal belongings within reach  - Initiate and maintain comfort rounds  - Make Fall Risk Sign visible to staff  - Apply yellow socks and bracelet for high fall risk patients  - Consider moving patient to room near nurses station  Outcome: Progressing  Goal: Maintain or return to baseline ADL function  Description: INTERVENTIONS:  -  Assess patient's ability to carry out ADLs; assess patient's baseline for ADL function and identify physical deficits which impact ability to perform ADLs (bathing, care of mouth/teeth, toileting, grooming, dressing, etc )  - Assess/evaluate cause of self-care deficits   - Assess range of motion  - Assess patient's mobility; develop plan if impaired  - Assess patient's need for assistive devices and provide as appropriate  - Encourage maximum independence but intervene and supervise when necessary  - Involve family in performance of ADLs  - Assess for home care needs following discharge   - Consider OT consult to assist with ADL evaluation and planning for discharge  - Provide patient education as appropriate  Outcome: Progressing  Goal: Maintains/Returns to pre admission functional level  Description: INTERVENTIONS:  - Perform BMAT or MOVE assessment daily    - Set and communicate daily mobility goal to care team and patient/family/caregiver     - Collaborate with rehabilitation services on mobility goals if consulted  - Record patient progress and toleration of activity level   Outcome: Progressing     Problem: CARDIOVASCULAR - ADULT  Goal: Maintains optimal cardiac output and hemodynamic stability  Description: INTERVENTIONS:  - Monitor I/O, vital signs and rhythm  - Monitor for S/S and trends of decreased cardiac output  - Administer and titrate ordered vasoactive medications to optimize hemodynamic stability  - Assess quality of pulses, skin color and temperature  - Assess for signs of decreased coronary artery perfusion  - Instruct patient to report change in severity of symptoms  Outcome: Progressing  Goal: Absence of cardiac dysrhythmias or at baseline rhythm  Description: INTERVENTIONS:  - Continuous cardiac monitoring, vital signs, obtain 12 lead EKG if ordered  - Administer antiarrhythmic and heart rate control medications as ordered  - Monitor electrolytes and administer replacement therapy as ordered  Outcome: Progressing     Problem: RESPIRATORY - ADULT  Goal: Achieves optimal ventilation and oxygenation  Description: INTERVENTIONS:  - Assess for changes in respiratory status  - Assess for changes in mentation and behavior  - Position to facilitate oxygenation and minimize respiratory effort  - Oxygen administered by appropriate delivery if ordered  - Initiate smoking cessation education as indicated  - Encourage broncho-pulmonary hygiene including cough, deep breathe, Incentive Spirometry  - Assess the need for suctioning and aspirate as needed  - Assess and instruct to report SOB or any respiratory difficulty  - Respiratory Therapy support as indicated  Outcome: Progressing     Problem: GASTROINTESTINAL - ADULT  Goal: Minimal or absence of nausea and/or vomiting  Description: INTERVENTIONS:  - Administer IV fluids if ordered to ensure adequate hydration  - Maintain NPO status until nausea and vomiting are resolved  - Nasogastric tube if ordered  - Administer ordered antiemetic medications as needed  - Provide nonpharmacologic comfort measures as appropriate  - Advance diet as tolerated, if ordered  - Consider nutrition services referral to assist patient with adequate nutrition and appropriate food choices  Outcome: Progressing  Goal: Maintains or returns to baseline bowel function  Description: INTERVENTIONS:  - Assess bowel function  - Encourage oral fluids to ensure adequate hydration  - Administer IV fluids if ordered to ensure adequate hydration  - Administer ordered medications as needed  - Encourage mobilization and activity  - Consider nutritional services referral to assist patient with adequate nutrition and appropriate food choices  Outcome: Progressing  Goal: Maintains adequate nutritional intake  Description: INTERVENTIONS:  - Monitor percentage of each meal consumed  - Identify factors contributing to decreased intake, treat as appropriate  - Assist with meals as needed  - Monitor I&O, weight, and lab values if indicated  - Obtain nutrition services referral as needed  Outcome: Progressing  Goal: Establish and maintain optimal ostomy function  Description: INTERVENTIONS:  - Assess bowel function  - Encourage oral fluids to ensure adequate hydration  - Administer IV fluids if ordered to ensure adequate hydration   - Administer ordered medications as needed  - Encourage mobilization and activity  - Nutrition services referral to assist patient with appropriate food choices  - Assess stoma site  - Consider wound care consult   Outcome: Progressing  Goal: Oral mucous membranes remain intact  Description: INTERVENTIONS  - Assess oral mucosa and hygiene practices  - Implement preventative oral hygiene regimen  - Implement oral medicated treatments as ordered  - Initiate Nutrition services referral as needed  Outcome: Progressing     Problem: METABOLIC, FLUID AND ELECTROLYTES - ADULT  Goal: Electrolytes maintained within normal limits  Description: INTERVENTIONS:  - Monitor labs and assess patient for signs and symptoms of electrolyte imbalances  - Administer electrolyte replacement as ordered  - Monitor response to electrolyte replacements, including repeat lab results as appropriate  - Instruct patient on fluid and nutrition as appropriate  Outcome: Progressing  Goal: Fluid balance maintained  Description: INTERVENTIONS:  - Monitor labs   - Monitor I/O and WT  - Instruct patient on fluid and nutrition as appropriate  - Assess for signs & symptoms of volume excess or deficit  Outcome: Progressing  Goal: Glucose maintained within target range  Description: INTERVENTIONS:  - Monitor Blood Glucose as ordered  - Assess for signs and symptoms of hyperglycemia and hypoglycemia  - Administer ordered medications to maintain glucose within target range  - Assess nutritional intake and initiate nutrition service referral as needed  Outcome: Progressing     Problem: Prexisting or High Potential for Compromised Skin Integrity  Goal: Skin integrity is maintained or improved  Description: INTERVENTIONS:  - Identify patients at risk for skin breakdown  - Assess and monitor skin integrity  - Assess and monitor nutrition and hydration status  - Monitor labs   - Assess for incontinence   - Turn and reposition patient  - Assist with mobility/ambulation  - Relieve pressure over bony prominences  - Avoid friction and shearing  - Provide appropriate hygiene as needed including keeping skin clean and dry  - Evaluate need for skin moisturizer/barrier cream  - Collaborate with interdisciplinary team   - Patient/family teaching  - Consider wound care consult   Outcome: Progressing

## 2023-05-07 NOTE — ASSESSMENT & PLAN NOTE
74-year-old male presenting with cough, chest tightness, shortness of breath, and generalized weakness likely secondary to Pneomocystis Carinii Pneumonia (Diagnosed through bronchoscopy, BAL positive for this)     • Wean off as tolerated  • Pending studies: Histoplasma antigen, Aspergillus antigen, TB PCR, Cytology

## 2023-05-08 NOTE — PROGRESS NOTES
"Progress Note - Pulmonary   Lesia Brown 70 y o  male MRN: [id number]  Unit/Bed#: E2 -01 Encounter: [id number]    Assessment/Plan:    1  Acute hypoxic respiratory failure  - Continue to wean oxygen as tolerated  Titrate to maintain oxygen saturations greater than 88%  Patient is currently on 3 L and oxygen saturation 93%  He does not wear home O2  - Pulmonary toilet: cough and deep breathe, incentive spirometer, OOB to chair as tolerated  -He may require home O2 eval prior to discharge    2  Pneumocystic Jirovecii pneumonia   -Positive BAL from bronchoscopy completed 5/2/23  -ID following  -P  O  Bactrim every 6 hours for 21 days   -Continue current dose of dexamethasone 4 mg every 8 hours--this is his home dose for hemolytic anemia  -Follow-up on TB PCR which is still in process     3  Multiple pulmonary nodules  -Likely metastatic  -Continue to follow with heme-onc    4  Metastatic melanoma  -Mets to brain, lung, liver, lymph nodes, and bone  - S/p whole brain radiation--patient and wife complaining of hallucinations at this time  -Continue to follow with heme-onc    5  History of PE/DVT  -On Pradaxa 150 mg every 12 hours    Discussed with wife at bedside, their biggest concern is his hallucinations, this can be related to decadron vs brain mets vs prolonged hospital stay  Discussed with Dr Yasmin Altamirano via TT  Patient is close to his respiratory baseline  We will schedule outpatient follow-up  Pulmonary will sign off, call with questions  Chief Complaint:    \"My breathing feels short  \"    Subjective:    Patient seen and examined sitting up in bed with wife at bedside  He mentions that his breathing feels \"short,\" however, he is unable to describe further what this means  Per wife, she has not noticed him short of breath  Their biggest concern at this time is patient's hallucinations      Objective:    Vitals: Blood pressure 118/77, pulse 85, temperature (!) 97 °F (36 1 °C), temperature source " "Temporal, resp  rate 20, height 5' 8\" (1 727 m), weight 87 6 kg (193 lb 2 oz), SpO2 93 %  3L NC,Body mass index is 29 36 kg/m²  Intake/Output Summary (Last 24 hours) at 5/8/2023 1646  Last data filed at 5/8/2023 1439  Gross per 24 hour   Intake --   Output 950 ml   Net -950 ml       Invasive Devices     Peripheral Intravenous Line  Duration           Peripheral IV 05/07/23 Left;Ventral (anterior) Forearm 1 day                Physical Exam  Vitals reviewed  Constitutional:       General: He is not in acute distress  Appearance: Normal appearance  He is not ill-appearing or toxic-appearing  Interventions: Nasal cannula in place  HENT:      Head: Normocephalic and atraumatic  Nose: Nose normal       Mouth/Throat:      Pharynx: Oropharynx is clear  Eyes:      Conjunctiva/sclera: Conjunctivae normal    Cardiovascular:      Rate and Rhythm: Normal rate and regular rhythm  Heart sounds: Normal heart sounds  Pulmonary:      Effort: Pulmonary effort is normal  No tachypnea, accessory muscle usage or respiratory distress  Breath sounds: Normal breath sounds  No stridor or decreased air movement  No decreased breath sounds, wheezing, rhonchi or rales  Chest:      Chest wall: No tenderness  Abdominal:      Palpations: Abdomen is soft  Musculoskeletal:         General: Normal range of motion  Cervical back: Normal range of motion  Right lower leg: No edema  Left lower leg: No edema  Skin:     General: Skin is warm and dry  Neurological:      Mental Status: He is alert and oriented to person, place, and time  Psychiatric:         Mood and Affect: Mood normal          Behavior: Behavior normal          Labs: I have personally reviewed pertinent lab results  Imaging and other studies: I have personally reviewed pertinent reports  and I have personally reviewed pertinent films in PACS       4/30/23 CTA chest: No pulmonary artery emboli    New bilateral lung " groundglass opacities and infiltrates, likely inflammatory versus infectious  Scattered small lung nodules again visualized, suspicious for metastases  4/30/23 CXR: Patchy groundglass infiltrates bilaterally, worse from the prior exam     4/22/23 CT chest abdomen pelvis: No acute findings in the chest, abdomen or pelvis  Multiple subtle enhancing lesions throughout the liver in keeping with numerous hepatic metastases  Known pulmonary and retroperitoneal metastases

## 2023-05-08 NOTE — ASSESSMENT & PLAN NOTE
69-year-old male presenting with cough, chest tightness, shortness of breath, and generalized weakness likely secondary to Pneomocystis Carinii Pneumonia (Diagnosed through bronchoscopy, BAL positive for this)  • Wean off as tolerated   Remains short of breath today  • Pending studies: Histoplasma antigen, Aspergillus antigen, TB PCR, Cytology

## 2023-05-08 NOTE — PROGRESS NOTES
[address] [place] [address]  Progress Note  Name: Annia Dunn  MRN: [id number]  Unit/Bed#: E2 -01 I Date of Admission: 4/30/2023   Date of Service: 5/8/2023 I Hospital Day: 8    Assessment/Plan   * Acute respiratory failure with hypoxia [facility])  Assessment & Plan  21-year-old male presenting with cough, chest tightness, shortness of breath, and generalized weakness likely secondary to Pneomocystis Carinii Pneumonia (Diagnosed through bronchoscopy, BAL positive for this)  • Wean off as tolerated  Remains short of breath today  • Pending studies: Histoplasma antigen, Aspergillus antigen, TB PCR, Cytology    Pneumocystis carinii pneumonia ([facility] [address] )  Assessment & Plan  Positive BAL for Pneumocystis carinii pneumonia  Risk factors in the includes steroid use in the setting of metastatic malignancy and possible BRAF therapy  · Antibiotic management per infectious disease  Previously on IV TMP-SMX and steroids, now on p o  Bactrim to complete 21-day course through 5/24/2023  · Monitor BMP and CMP  · Infectious disease recommending 21 days of adjunctive corticosteroids for his hypoxia  Patient currently on chronic Decadron which they state will be sufficient for this purpose  Autoimmune hemolytic anemia  Assessment & Plan  History of warm antibody hemolytic anemia  Previously treated with Rituxan (8/2022 last received)  • Continue chronic steroid treatment with Dexamethasone 4mg Q8 hours  • Appreciate hematology-oncology recommendations  History of pulmonary embolism  Assessment & Plan  Maintained on Pradaxa    History of Clostridium difficile infection  Assessment & Plan  Hx of diverticulitis, C  Diff in the past  C  Diff PCR positive, but negative toxins  • Continue po vancomycin prophylaxis      Metastatic melanoma [facility])  Assessment & Plan  Biopsy confirmed 3/17/2023  Right peritoneum consistent with malignant melanoma with brain mets  BRAF mutation    · Encorafenib and Binimetinib started 2023 (Currently on hold)           VTE Pharmacologic Prophylaxis:   Pharmacologic: Dabigatran (Pradaxa)  Mechanical VTE Prophylaxis in Place: Yes    Discussions with Specialists or Other Care Team Provider: nursing    Education and Discussions with Family / Patient: patient    Current Length of Stay: 8 day(s)    Current Patient Status: Inpatient   Certification Statement: The patient will continue to require additional inpatient hospital stay due to shortness of breath, id reevaluation    Discharge Plan: active    Code Status: Level 1 - Full Code      Subjective:   Patient sen and examined at bedside  Reports shortness of breath today  Objective:     Vitals:   Temp (24hrs), Av 4 °F (36 3 °C), Min:97 2 °F (36 2 °C), Max:97 6 °F (36 4 °C)    Temp:  [97 2 °F (36 2 °C)-97 6 °F (36 4 °C)] 97 2 °F (36 2 °C)  HR:  [68-75] 75  Resp:  [14-22] 20  BP: (119-140)/(65-86) 133/78  SpO2:  [93 %-96 %] 93 %  Body mass index is 29 36 kg/m²  Input and Output Summary (last 24 hours): Intake/Output Summary (Last 24 hours) at 2023 1038  Last data filed at 2023 0839  Gross per 24 hour   Intake --   Output 750 ml   Net -750 ml       Physical Exam:     Physical Exam  Vitals reviewed  Constitutional:       General: He is not in acute distress  HENT:      Head: Normocephalic  Nose: Nose normal       Mouth/Throat:      Mouth: Mucous membranes are moist    Eyes:      General: No scleral icterus  Cardiovascular:      Rate and Rhythm: Normal rate and regular rhythm  Pulmonary:      Effort: Pulmonary effort is normal  No respiratory distress  Breath sounds: Rhonchi present  No rales  Abdominal:      General: There is no distension  Palpations: Abdomen is soft  Tenderness: There is no abdominal tenderness  Musculoskeletal:      Right lower leg: No edema  Left lower leg: No edema  Neurological:      Mental Status: He is alert and oriented to person, place, and time  Psychiatric:         Mood and Affect: Mood normal          Behavior: Behavior normal        Additional Data:     Labs:    Results from last 7 days   Lab Units 05/08/23  0427   WBC Thousand/uL 16 64*   HEMOGLOBIN g/dL 10 6*   HEMATOCRIT % 31 2*   PLATELETS Thousands/uL 497*   NEUTROS PCT % 78*   LYMPHS PCT % 12*   MONOS PCT % 7   EOS PCT % 0     Results from last 7 days   Lab Units 05/08/23  0427 05/05/23  0333 05/04/23  0612   SODIUM mmol/L 126*   < > 132*   POTASSIUM mmol/L 4 4   < > 3 3*   CHLORIDE mmol/L 93*   < > 97   CO2 mmol/L 22   < > 26   BUN mg/dL 24   < > 19   CREATININE mg/dL 1 49*   < > 1 13   ANION GAP mmol/L 11   < > 9   CALCIUM mg/dL 9 1   < > 8 5   ALBUMIN g/dL  --   --  2 8*   TOTAL BILIRUBIN mg/dL  --   --  0 46   ALK PHOS U/L  --   --  51   ALT U/L  --   --  17   AST U/L  --   --  20   GLUCOSE RANDOM mg/dL 105   < > 99    < > = values in this interval not displayed  Results from last 7 days   Lab Units 05/02/23  1651 05/02/23  1109 05/02/23  0804   POC GLUCOSE mg/dl 89 133 119         Results from last 7 days   Lab Units 05/04/23  0612 05/03/23  0530 05/02/23  0501   PROCALCITONIN ng/ml 0 57* 0 74* 0 73*           * I Have Reviewed All Lab Data Listed Above  * Additional Pertinent Lab Tests Reviewed:  [address] Admission Reviewed      Lines:   Invasive Devices     Peripheral Intravenous Line  Duration           Peripheral IV 05/07/23 Left;Ventral (anterior) Forearm 1 day                   Imaging:    Imaging Reports Reviewed Today Include: no new imaging    Recent Cultures (last 7 days):     Results from last 7 days   Lab Units 05/02/23  1615 05/02/23  1614 05/02/23  1613   GRAM STAIN RESULT  1+ Polys  No bacteria seen No Polys or Bacteria seen No Polys or Bacteria seen       Last 24 Hours Medication List:   Current Facility-Administered Medications   Medication Dose Route Frequency Provider Last Rate   • acetaminophen  650 mg Oral Q6H PRN Alex Alexander MD • albuterol  2 puff Inhalation Q4H PRN Agatha Santos MD     • ALPRAZolam  0 5 mg Oral HS PRN Agatha Santos MD     • aluminum-magnesium hydroxide-simethicone  30 mL Oral Q6H PRN Agatha Santos MD     • benzonatate  100 mg Oral TID PRN Agatha Santos MD     • carbamide peroxide  5 drop Both Ears BID Agatha Santos MD     • dabigatran etexilate  150 mg Oral Q12H [address] Agatha Santos MD     • dexamethasone  4 mg Oral Q8H Agatha Santos MD     • dextromethorphan-guaiFENesin  10 mL Oral Q4H PRN Agatha Santos MD     • hydrochlorothiazide  25 mg Oral Daily Agatha Santos MD     • memantine  10 mg Oral BID Agatha Santos MD     • metoprolol succinate  12 5 mg Oral Daily Agatha Santos MD     • ondansetron  4 mg Intravenous Q6H PRN Agatha Santos MD     • pantoprazole  40 mg Oral Early Morning Agatha Santos MD     • polyethylene glycol  17 g Oral Daily PRN Agatha Santos MD     • prazosin  1 mg Oral HS Agatha Santos MD     • sulfamethoxazole-trimethoprim  2 tablet Oral Q6H Agatha Santos MD          Today, Patient Was Seen By: Ludmila Arredondo MD    ** Please Note: Dictation voice to text software may have been used in the creation of this document   **

## 2023-05-08 NOTE — PLAN OF CARE
Problem: Potential for Falls  Goal: Patient will remain free of falls  Description: INTERVENTIONS:  - Educate patient/family on patient safety including physical limitations  - Instruct patient to call for assistance with activity   - Consult OT/PT to assist with strengthening/mobility   - Keep Call bell within reach  - Keep bed low and locked with side rails adjusted as appropriate  - Keep care items and personal belongings within reach  - Initiate and maintain comfort rounds  - Make Fall Risk Sign visible to staff  - Offer Toileting every  Hours, in advance of need  - Initiate/Maintain alarm  - Obtain necessary fall risk management equipment:   - Apply yellow socks and bracelet for high fall risk patients  - Consider moving patient to room near nurses station  Outcome: Progressing     Problem: MOBILITY - ADULT  Goal: Maintain or return to baseline ADL function  Description: INTERVENTIONS:  -  Assess patient's ability to carry out ADLs; assess patient's baseline for ADL function and identify physical deficits which impact ability to perform ADLs (bathing, care of mouth/teeth, toileting, grooming, dressing, etc )  - Assess/evaluate cause of self-care deficits   - Assess range of motion  - Assess patient's mobility; develop plan if impaired  - Assess patient's need for assistive devices and provide as appropriate  - Encourage maximum independence but intervene and supervise when necessary  - Involve family in performance of ADLs  - Assess for home care needs following discharge   - Consider OT consult to assist with ADL evaluation and planning for discharge  - Provide patient education as appropriate  Outcome: Progressing  Goal: Maintains/Returns to pre admission functional level  Description: INTERVENTIONS:  - Perform BMAT or MOVE assessment daily    - Set and communicate daily mobility goal to care team and patient/family/caregiver     - Collaborate with rehabilitation services on mobility goals if consulted  - Perform Range of Motion  times a day  - Reposition patient every  hours    - Dangle patient  times a day  - Stand patient  times a day  - Ambulate patient  times a day  - Out of bed to chair  times a day   - Out of bed for meals  times a day  - Out of bed for toileting  - Record patient progress and toleration of activity level   Outcome: Progressing     Problem: INFECTION - ADULT  Goal: Absence or prevention of progression during hospitalization  Description: INTERVENTIONS:  - Assess and monitor for signs and symptoms of infection  - Monitor lab/diagnostic results  - Monitor all insertion sites, i e  indwelling lines, tubes, and drains  - Monitor endotracheal if appropriate and nasal secretions for changes in amount and color  - [place] appropriate cooling/warming therapies per order  - Administer medications as ordered  - Instruct and encourage patient and family to use good hand hygiene technique  - Identify and instruct in appropriate isolation precautions for identified infection/condition  Outcome: Progressing  Goal: Absence of fever/infection during neutropenic period  Description: INTERVENTIONS:  - Monitor WBC    Outcome: Progressing     Problem: SAFETY ADULT  Goal: Patient will remain free of falls  Description: INTERVENTIONS:  - Educate patient/family on patient safety including physical limitations  - Instruct patient to call for assistance with activity   - Consult OT/PT to assist with strengthening/mobility   - Keep Call bell within reach  - Keep bed low and locked with side rails adjusted as appropriate  - Keep care items and personal belongings within reach  - Initiate and maintain comfort rounds  - Make Fall Risk Sign visible to staff  - Offer Toileting every  Hours, in advance of need  - Initiate/Maintain alarm  - Obtain necessary fall risk management equipment:   - Apply yellow socks and bracelet for high fall risk patients  - Consider moving patient to room near nurses station  Outcome: Progressing  Goal: Maintain or return to baseline ADL function  Description: INTERVENTIONS:  -  Assess patient's ability to carry out ADLs; assess patient's baseline for ADL function and identify physical deficits which impact ability to perform ADLs (bathing, care of mouth/teeth, toileting, grooming, dressing, etc )  - Assess/evaluate cause of self-care deficits   - Assess range of motion  - Assess patient's mobility; develop plan if impaired  - Assess patient's need for assistive devices and provide as appropriate  - Encourage maximum independence but intervene and supervise when necessary  - Involve family in performance of ADLs  - Assess for home care needs following discharge   - Consider OT consult to assist with ADL evaluation and planning for discharge  - Provide patient education as appropriate  Outcome: Progressing  Goal: Maintains/Returns to pre admission functional level  Description: INTERVENTIONS:  - Perform BMAT or MOVE assessment daily    - Set and communicate daily mobility goal to care team and patient/family/caregiver  - Collaborate with rehabilitation services on mobility goals if consulted  - Perform Range of Motion  times a day  - Reposition patient every  hours    - Dangle patient times a day  - Stand patient  times a day  - Ambulate patient  times a day  - Out of bed to chair es a day   - Out of bed for meals  times a day  - Out of bed for toileting  - Record patient progress and toleration of activity level   Outcome: Progressing     Problem: CARDIOVASCULAR - ADULT  Goal: Maintains optimal cardiac output and hemodynamic stability  Description: INTERVENTIONS:  - Monitor I/O, vital signs and rhythm  - Monitor for S/S and trends of decreased cardiac output  - Administer and titrate ordered vasoactive medications to optimize hemodynamic stability  - Assess quality of pulses, skin color and temperature  - Assess for signs of decreased coronary artery perfusion  - Instruct patient to report change in severity of symptoms  Outcome: Progressing  Goal: Absence of cardiac dysrhythmias or at baseline rhythm  Description: INTERVENTIONS:  - Continuous cardiac monitoring, vital signs, obtain 12 lead EKG if ordered  - Administer antiarrhythmic and heart rate control medications as ordered  - Monitor electrolytes and administer replacement therapy as ordered  Outcome: Progressing     Problem: RESPIRATORY - ADULT  Goal: Achieves optimal ventilation and oxygenation  Description: INTERVENTIONS:  - Assess for changes in respiratory status  - Assess for changes in mentation and behavior  - Position to facilitate oxygenation and minimize respiratory effort  - Oxygen administered by appropriate delivery if ordered  - Initiate smoking cessation education as indicated  - Encourage broncho-pulmonary hygiene including cough, deep breathe, Incentive Spirometry  - Assess the need for suctioning and aspirate as needed  - Assess and instruct to report SOB or any respiratory difficulty  - Respiratory Therapy support as indicated  Outcome: Progressing

## 2023-05-08 NOTE — PROGRESS NOTES
Progress Note - Infectious Disease   Excell Dougie 70 y o  male MRN: [id number]  Unit/Bed#: E2 -01 Encounter: [id number]      Impression/Plan:    1  Pneumocystis pneumonia  CXR and CT chest show new bilateral GGO  Consider infectious vs inflammatory etiologies in this chronically immunosuppressed patient  PJP DFA from BAL positive  RP2 negative and COVID19 negative  Patient lived in [place] for 15 years, thus consider possibility of TB, though no other risk factors  Pulmonology following and he is s/p bronchoscopy for BAL 5/1/23 with multiple samples positive by DFA for pneumocystis, confirming diagnosis  Overall clinically improving with lower O2 needs on Bactrim  - continue PO Bactrim 2 DS QID  - monitor CrCl and potassium; may need to adjust dose if renal function continues to rise as below  - plan for 21 day course total, through 5/24  - will also need 21 day course of adjunctive steroids; he is already on adequate coverage with his home chronic decadron  -following treatment, would benefit from secondary prophylaxis while on lifelong steroids   -will need outpatient ID follow up upon discharge; will notify office when closer to d/c  - monitor CBC + diff and BMP weekly while on therapy after DC     2  Acute respiratory failure with hypoxia  This is presumed secondary to #1  Patient is now improving with O weaned down to 4L since starting treatment as abovel     - antibiotics and steroids for PJP as above  - follow up outstanding BAL labs:  - TB PCR  - Aspergillus Ag  - Histoplasma Ag  -close pulmonology follow up  -continue respiratory protocol and pulmonary toileting      3  ARABELLA: Estimated Creatinine Clearance: 50 4 mL/min (A) (by C-G formula based on SCr of 1 49 mg/dL (H))  - developed after starting Bactrim   Suspect this is largely medication side effect   -daily BMP for now  -may need to dose adjust Bactrim if Cr continues to rise     4  Hx of malignant melanoma, mets to brain, lung, liver, LN, bone  S/p whole brain radiation  Briefly started on treatment with encorafenib/binimetinib and was supposed to restart on 4/3 but now on hold due to recent acute illness  Follows with outpatient heme/onc, Dr Miranda    -close onc follow up      5  Chronic diarrhea  Known hx of C diff in the past  Recent admission for diverticulitis  CT A/P negative  C  Diff PCR positive with negative EIA  Likely colonized  -monitor closely for development of diarrhea     6  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, 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  Follows outpatient heme/onc  Previously treated with Rituxan and recently restarted on dexamethasone for #3  Follows Dr Hunt Needles  -monitor CBC     7  Hx PE on Pradaxa  CTA chest negative for VTE  Antibiotics:  TMP-SMX    Subjective:  Patient has no fever, chills, sweats  He thinks breathing is improving, but still dyspneic with movement  Overall does feel better  No diarrhea  Objective:  Vitals:  Temp:  [97 2 °F (36 2 °C)-97 6 °F (36 4 °C)] 97 2 °F (36 2 °C)  HR:  [68-75] 75  Resp:  [14-22] 20  BP: (119-145)/(65-86) 133/78  SpO2:  [93 %-96 %] 93 %  Temp (24hrs), Av 4 °F (36 3 °C), Min:97 2 °F (36 2 °C), Max:97 6 °F (36 4 °C)  Current: Temperature: (!) 97 2 °F (36 2 °C)    Physical Exam:   General Appearance:  Alert, interactive, nontoxic, no acute distress  Throat: Oropharynx moist without lesions  Lungs:    respirations unlabored   Heart:  RRR; no murmur   Abdomen:   Soft, non-tender      Extremities: No clubbing, cyanosis or edema   Skin: No new rashes or lesions  No draining wounds noted  Labs:    All pertinent labs and imaging studies were personally reviewed  Results from last 7 days   Lab Units 23  0519 23  0540   WBC Thousand/uL 16 64* 17 26* 19 84*   HEMOGLOBIN g/dL 10 6* 10 6* 10 0*   PLATELETS Thousands/uL 497* 434* 389     Results from last 7 days   Lab Units 23  0519 23  0540 23  8523 05/04/23  0612 05/03/23  0530 05/02/23  0501   SODIUM mmol/L 126* 127* 129*   < > 132* 134* 134*   POTASSIUM mmol/L 4 4 4 2 3 9   < > 3 3* 3 8 3 6   CHLORIDE mmol/L 93* 94* 95*   < > 97 100 101   CO2 mmol/L 22 25 25   < > 26 27 26   BUN mg/dL 24 20 16   < > 19 23 26*   CREATININE mg/dL 1 49* 1 38* 1 30   < > 1 13 1 08 1 19   EGFR ml/min/1 73sq m 47 51 55   < > 65 69 61   CALCIUM mg/dL 9 1 9 1 8 6   < > 8 5 8 5 8 2*   AST U/L  --   --   --   --  20 27 25   ALT U/L  --   --   --   --  17 20 23   ALK PHOS U/L  --   --   --   --  51 52 53    < > = values in this interval not displayed       Results from last 7 days   Lab Units 05/04/23  0612 05/03/23  0530 05/02/23  0501   PROCALCITONIN ng/ml 0 57* 0 74* 0 73*     Results from last 7 days   Lab Units 05/03/23  0530   CRP mg/L 276 9*     Results from last 7 days   Lab Units 05/06/23  0540   FERRITIN ng/mL 2,579*           Micro:  Results from last 7 days   Lab Units 05/02/23  1615 05/02/23  1614 05/02/23  1613   GRAM STAIN RESULT  1+ Polys  No bacteria seen No Polys or Bacteria seen No Polys or Bacteria seen       Imaging:          Smooth Gardner MD  [facility]

## 2023-05-08 NOTE — PLAN OF CARE
Problem: RESPIRATORY - ADULT  Goal: Achieves optimal ventilation and oxygenation  Description: INTERVENTIONS:  - Assess for changes in respiratory status  - Assess for changes in mentation and behavior  - Position to facilitate oxygenation and minimize respiratory effort  - Oxygen administered by appropriate delivery if ordered  - Initiate smoking cessation education as indicated  - Encourage broncho-pulmonary hygiene including cough, deep breathe, Incentive Spirometry  - Assess the need for suctioning and aspirate as needed  - Assess and instruct to report SOB or any respiratory difficulty  - Respiratory Therapy support as indicated  Outcome: Progressing     Problem: MOBILITY - ADULT  Goal: Maintain or return to baseline ADL function  Description: INTERVENTIONS:  -  Assess patient's ability to carry out ADLs; assess patient's baseline for ADL function and identify physical deficits which impact ability to perform ADLs (bathing, care of mouth/teeth, toileting, grooming, dressing, etc )  - Assess/evaluate cause of self-care deficits   - Assess range of motion  - Assess patient's mobility; develop plan if impaired  - Assess patient's need for assistive devices and provide as appropriate  - Encourage maximum independence but intervene and supervise when necessary  - Involve family in performance of ADLs  - Assess for home care needs following discharge   - Consider OT consult to assist with ADL evaluation and planning for discharge  - Provide patient education as appropriate  Outcome: Progressing  Goal: Maintains/Returns to pre admission functional level  Description: INTERVENTIONS:  - Perform BMAT or MOVE assessment daily    - Set and communicate daily mobility goal to care team and patient/family/caregiver  - Collaborate with rehabilitation services on mobility goals if consulted  - Perform Range of Motion 3  times a day  - Reposition patient every 2  hours    - Dangle patient 3  times a day  - Stand patient 3 times a day  - Ambulate patient 3  times a day  - Out of bed to chair 3  times a day   - Out of bed for meals 3  times a day  - Out of bed for toileting  - Record patient progress and toleration of activity level   Outcome: Progressing

## 2023-05-09 PROBLEM — E87.1 HYPONATREMIA: Status: ACTIVE | Noted: 2023-01-01

## 2023-05-09 NOTE — OCCUPATIONAL THERAPY NOTE
Occupational Therapy Progress Note     Patient Name: eDsmond Florentino  LAJZZ'X Date: 5/9/2023  Problem List  Principal Problem:    Acute respiratory failure with hypoxia [facility])  Active Problems:    History of pulmonary embolism    Autoimmune hemolytic anemia    Metastatic melanoma ([facility] [address] )    History of Clostridium difficile infection    [facility]    Hyponatremia        05/09/23 1249   OT Last Visit   OT Visit Date 05/09/23   Note Type   Note Type Treatment for insurance authorization   Pain Assessment   Pain Assessment Tool 0-10   Pain Score No Pain   Restrictions/Precautions   Weight Bearing Precautions Per Order No   Other Precautions Airborne/isolation;Contact/isolation;Cognitive; Chair Alarm; Bed Alarm; Fall Risk;O2;Multiple lines  (3L O2)   ADL   Where Assessed Other (Comment)  (Upright seated position in bed)   Grooming Assistance 4  Minimal Assistance   Grooming Deficit Setup;Verbal cueing;Supervision/safety; Increased time to complete   UB Bathing Assistance 4  Minimal Assistance   UB Bathing Deficit Setup;Supervision/safety;Verbal cueing; Increased time to complete;Right arm;Left arm; Abdomen   LB Bathing Assistance 3  Moderate Assistance   LB Bathing Deficit Setup;Verbal cueing;Supervision/safety; Increased time to complete;Perineal area; Buttocks;Right lower leg including foot; Left lower leg including foot   UB Dressing Assistance 4  Minimal Assistance   UB Dressing Deficit Setup;Verbal cueing; Increased time to complete;Supervision/safety;Pull around back;Pull down in back   LB Dressing Assistance 2  Maximal Assistance   LB Dressing Deficit Setup;Verbal cueing; Increased time to complete;Supervision/safety; Don/doff R sock; Don/doff L sock   Functional Standing Tolerance   Time ~10-15 seconds   Activity Static standing trials   Comments Mod A of 2 for balance/steadying, B/L HHA   Bed Mobility   Supine to Sit 3  Moderate assistance   Additional items Assist x 2;HOB "elevated; Bedrails; Increased time required;Verbal cues;LE management   Sit to Supine 2  Maximal assistance   Additional items Assist x 2; Increased time required;Verbal cues;LE management   Additional Comments Pt lying supine at end of session w/ PTA present  All needs met and pt reports no further questions for OT at this time  Transfers   Sit to Stand 3  Moderate assistance   Additional items Assist x 2; Increased time required;Verbal cues   Stand to Sit 3  Moderate assistance   Additional items Assist x 2; Increased time required;Verbal cues   Cognition   Overall Cognitive Status Impaired   Arousal/Participation Alert; Cooperative   Attention Attends with cues to redirect   Orientation Level Oriented to person;Oriented to place;Oriented to time   Memory Decreased recall of precautions;Decreased recall of recent events;Decreased short term memory   Following Commands Follows one step commands without difficulty   Comments Pt noted w/ visual hallucinations EOB, stating \"Do you see my dog sitting there? There's a bunch of flies in here  \"   Activity Tolerance   Activity Tolerance Treatment limited secondary to medical complications (Comment)   Medical Staff Made Aware Liban RN; Alyssa iL PTA   Assessment   Assessment Pt seen for OT treatment session focusing on functional activity tolerance, bed mobility, ADLs, functional transfers/mobility and energy conservation education  Pt lying supine at start of session, agreeable to OT treatment session w/ increased encouragement from therapists  Bed mobility (supine>sit) completed w/ Mod A of 2 with assist for LE management and trunk support  Pt able to tolerate approx 5-7 mins sitting EOB  Pt retropulsive sitting EOB and w/ Poor static seated balance, requiring Mod A to maintain upright seated position  With max verbal cues, pt able to progress to Fair- static seated balance  Pt c/o dizziness initially upon sitting EOB  BP: 147/77, SpO2: 95-96% on 3L O2   Pt noted w/ visual " "hallucinations EOB, stating \"Do you see my dog sitting there? There's a bunch of flies in here  \" Pt requesting to use BSC  Transfers (sit<>stand) completed w/ Mod A of 2 with assist to initiate forward weight shift from surface for sit>stand and assist for controlled descent to surface for stand>sit  Pt required verbal cues for safe technique, hand placement, and posture  Pt noted w/ increased tremors/shakiness upon standing, unable to take steps at this time and requesting to return to supine position 2* SOB  SpO2 post-activity: 85-86% on 2L O2, increasing to 91-97% w/ rest break  Max A of 2 required for sit>supine with assist for B/L LE management and trunk support  ADL routine completed while in seated position in bed  UB ADLs completed w/ Min A with assist for thoroughness for UB bathing and assist to bring gown over shldrs/down in back for UB dressing  LB bathing completed w/ Mod A w/ limited functional reach noted to wash B/L lower legs and feet  LB dressing completed w/ Max A with limited functional reach noted to don/doff B/L socks  Pt able to doff L sock w/ supervision and increased time/effort, however required assist to doff R sock and don B/L socks  Pt lying supine at end of session w/ PTA present  All needs met and pt reports no further questions for OT at this time  Continue to recommend STR when medically cleared  OT to follow pt on caseload  Plan   Treatment Interventions ADL retraining;Functional transfer training; Endurance training;Patient/family training;Equipment evaluation/education; Compensatory technique education;Continued evaluation; Activityengagement   Goal Expiration Date 05/17/23   OT Treatment Day 1   OT Frequency 3-5x/wk   Recommendation   OT Discharge Recommendation Post acute rehabilitation services   Additional Comments  The patient's raw score on the AM-PAC Daily Activity Inpatient Short Form is 15   A raw score of less than 19 suggests the patient may benefit from discharge to " post-acute rehabilitation services  Please refer to the recommendation of the Occupational Therapist for safe discharge planning     AM-PAC Daily Activity Inpatient   Lower Body Dressing 2   Bathing 2   Toileting 2   Upper Body Dressing 3   Grooming 3   Eating 3   Daily Activity Raw Score 15   Daily Activity Standardized Score (Calc for Raw Score >=11) 34 69   AM-PAC Applied Cognition Inpatient   Following a Speech/Presentation 3   Understanding Ordinary Conversation 3   Taking Medications 2   Remembering Where Things Are Placed or Put Away 2   Remembering List of 4-5 Errands 2   Taking Care of Complicated Tasks 2   Applied Cognition Raw Score 14   Applied Cognition Standardized Score 32 02       Jigar Rebollar, OTR/L

## 2023-05-09 NOTE — ARC ADMISSION
Referral received for consideration of patient for inpatient acute rehab  Will review patient's case with [facility] physician and update CM as able  Reviewed patient's case with [facility] physician - will continue to follow at this time  Requesting updated PT/OT notes to assess patient's tolerance for an aggressive rehab program  CM has been updated

## 2023-05-09 NOTE — PROGRESS NOTES
Progress Note - Infectious Disease   Ailyn Askew 70 y o  male MRN: [id number]  Unit/Bed#: E2 -01 Encounter: [id number]      Impression/Plan:    1  Pneumocystis pneumonia  CXR and CT chest show new bilateral GGO  Consider infectious vs inflammatory etiologies in this chronically immunosuppressed patient  PJP DFA from BAL positive  RP2 negative and COVID19 negative  Patient lived in [place] for 15 years, thus consider possibility of TB, though no other risk factors  Pulmonology following and he is s/p bronchoscopy for BAL 5/1/23 with multiple samples positive by DFA for pneumocystis, confirming diagnosis  Overall clinically improving with lower O2 needs on Bactrim  - continue PO Bactrim 2 DS QID for now  - monitor CrCl and potassium; ok to continue current dosing  - once patient is off O2, we can decrease Bactrim dose to 2 DS tablets TID  - plan for 21 day course total, through 5/24  - will also need 21 day course of adjunctive steroids; he is already on adequate coverage with his home chronic decadron  -following treatment, would benefit from secondary prophylaxis while on lifelong steroids   -will need outpatient ID follow up upon discharge; will notify office when closer to d/c  - monitor CBC + diff and BMP weekly while on therapy after DC     2  Acute respiratory failure with hypoxia  This is presumed secondary to #1  Patient is now improving with O2 weaned down to 3L since starting treatment as above  Aspergillus and Histoplasma antigens from BAL negative      - antibiotics and steroids for PJP as above  - follow up outstanding BAL labs:  - TB PCR  -close pulmonology follow up      3  ARABELLA: Estimated Creatinine Clearance: 49 mL/min (A) (by C-G formula based on SCr of 1 53 mg/dL (H))  - developed after starting Bactrim   Suspect this is largely medication side effect   -daily BMP for now  - current dose of Bactrim ok, as above  -may need to dose adjust Bactrim if Cr continues to rise     4  Hx of malignant melanoma, mets to brain, lung, liver, LN, bone  S/p whole brain radiation  Briefly started on treatment with encorafenib/binimetinib and was supposed to restart on 4/3 but now on hold due to recent acute illness  Follows with outpatient heme/onc, Dr Miranda    -close onc follow up      5  Chronic diarrhea  Known hx of C diff in the past  Recent admission for diverticulitis  CT A/P negative  C  Diff PCR positive with negative EIA  Likely colonized  -monitor closely for development of diarrhea     6  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, 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  Follows outpatient heme/onc  Previously treated with Rituxan and recently restarted on dexamethasone for #3  Follows Dr Marilee Lindsey  -monitor CBC     7  Hx PE on Pradaxa  CTA chest negative for VTE  Antibiotics:  TMP-SMX    Subjective:  Patient denies fever, chills, cough  His respiratory symptoms continue to improve  Main complaint today are of dizziness and hallucinations  Reports seeing a dog in his room that he knows is not there  These symptoms have been going on last 3 weeks he reports, even before the antibiotic, but are worse  Objective:  Vitals:  Temp:  [97 °F (36 1 °C)-98 2 °F (36 8 °C)] 98 2 °F (36 8 °C)  HR:  [83-86] 86  Resp:  [19-20] 19  BP: (118-150)/(77-80) 146/77  SpO2:  [92 %-93 %] 92 %  Temp (24hrs), Av 8 °F (36 6 °C), Min:97 °F (36 1 °C), Max:98 2 °F (36 8 °C)  Current: Temperature: 98 2 °F (36 8 °C)    Physical Exam:   General Appearance:  Alert, interactive, nontoxic, no acute distress  Throat: Oropharynx moist without lesions  Lungs:    respirations unlabored   Heart:  RRR; no murmur   Abdomen:   Soft, non-tender      Extremities: No clubbing, cyanosis or edema   Skin: No new rashes or lesions  No draining wounds noted  Labs:    All pertinent labs and imaging studies were personally reviewed  Results from last 7 days   Lab Units 23  0604 23  0427 23  0519   WBC Thousand/uL 18 95* 16 64* 17 26*   HEMOGLOBIN g/dL 11 5* 10 6* 10 6*   PLATELETS Thousands/uL 547* 497* 434*     Results from last 7 days   Lab Units 05/09/23  0604 05/08/23  0427 05/07/23  0519 05/05/23  0333 05/04/23  0612 05/03/23  0530   SODIUM mmol/L 126* 126* 127*   < > 132* 134*   POTASSIUM mmol/L 4 3 4 4 4 2   < > 3 3* 3 8   CHLORIDE mmol/L 93* 93* 94*   < > 97 100   CO2 mmol/L 22 22 25   < > 26 27   BUN mg/dL 26* 24 20   < > 19 23   CREATININE mg/dL 1 53* 1 49* 1 38*   < > 1 13 1 08   EGFR ml/min/1 73sq m 45 47 51   < > 65 69   CALCIUM mg/dL 9 2 9 1 9 1   < > 8 5 8 5   AST U/L  --   --   --   --  20 27   ALT U/L  --   --   --   --  17 20   ALK PHOS U/L  --   --   --   --  51 52    < > = values in this interval not displayed       Results from last 7 days   Lab Units 05/04/23  0612 05/03/23  0530   PROCALCITONIN ng/ml 0 57* 0 74*     Results from last 7 days   Lab Units 05/03/23  0530   CRP mg/L 276 9*     Results from last 7 days   Lab Units 05/06/23  0540   FERRITIN ng/mL 2,579*           Micro:  Results from last 7 days   Lab Units 05/02/23  1615 05/02/23  1614 05/02/23  1613   GRAM STAIN RESULT  1+ Polys  No bacteria seen No Polys or Bacteria seen No Polys or Bacteria seen       Imaging:          Ricky Neville MD  [facility]

## 2023-05-09 NOTE — ASSESSMENT & PLAN NOTE
Sodium levels downtrending   Hold hydrohlothiazide    Recent Labs     05/07/23  0519 05/08/23  0427 05/09/23  0604   SODIUM 127* 126* 126*

## 2023-05-09 NOTE — PROGRESS NOTES
[address] [place] [address]  Progress Note  Name: Mar High  MRN: [id number]  Unit/Bed#: E2 -01 I Date of Admission: 4/30/2023   Date of Service: 5/9/2023 I Hospital Day: 9    Assessment/Plan   * Acute respiratory failure with hypoxia [facility])  Assessment & Plan  39-year-old male presenting with cough, chest tightness, shortness of breath, and generalized weakness likely secondary to Pneomocystis Carinii Pneumonia (Diagnosed through bronchoscopy, BAL positive for this)  • Wean off as tolerated  reports feeling better but still gets short of breath on exertion  • TB PCR pending    [facility]  Assessment & Plan  Positive BAL for Pneumocystis carinii pneumonia  Risk factors in the includes steroid use in the setting of metastatic malignancy and possible BRAF therapy  · Antibiotic management per infectious disease  Previously on IV TMP-SMX and steroids, now on p o  Bactrim to complete 21-day course through 5/24/2023  · Monitor BMP and CMP  · Infectious disease recommending 21 days of adjunctive corticosteroids for his hypoxia  Patient currently on chronic Decadron which they state will be sufficient for this purpose  Autoimmune hemolytic anemia  Assessment & Plan  History of warm antibody hemolytic anemia  Previously treated with Rituxan (8/2022 last received)  • Continue chronic steroid treatment with Dexamethasone 4mg Q8 hours  But dose might have to be decreased due to hallucinations  Will have hematology oncology reassess need for continued dose  • Appreciate hematology-oncology recommendations  History of pulmonary embolism  Assessment & Plan  Maintained on Pradaxa    Hyponatremia  Assessment & Plan  Sodium levels downtrending  Hold hydrohlothiazide    Recent Labs     05/07/23  0519 05/08/23  0427 05/09/23  0604   SODIUM 127* 126* 126*             History of Clostridium difficile infection  Assessment & Plan  Hx of diverticulitis, C   Diff in the past  C  Diff PCR positive, but negative toxins  • Continue po vancomycin prophylaxis      Metastatic melanoma [facility])  Assessment & Plan  Biopsy confirmed 3/17/2023  Right peritoneum consistent with malignant melanoma with brain mets  BRAF mutation  · Encorafenib and Binimetinib started 2023 (Currently on hold)         VTE Pharmacologic Prophylaxis:   Pharmacologic: Dabigatran (Pradaxa)  Mechanical VTE Prophylaxis in Place: Yes    Discussions with Specialists or Other Care Team Provider: nursing    Education and Discussions with Family / Patient: patient    Current Length of Stay: 9 day(s)    Current Patient Status: Inpatient   Certification Statement: The patient will continue to require additional inpatient hospital stay due to antibiotics, tb testing, hallucination, hema onc reevaluation    Discharge Plan: active    Code Status: Level 1 - Full Code      Subjective:   Patient seen and examined at bedside  Reports that he is developing hallucinations  Spoke to his wife over the phone who expressed the same concerns  Objective:     Vitals:   Temp (24hrs), Av 8 °F (36 6 °C), Min:97 °F (36 1 °C), Max:98 2 °F (36 8 °C)    Temp:  [97 °F (36 1 °C)-98 2 °F (36 8 °C)] 98 2 °F (36 8 °C)  HR:  [83-86] 86  Resp:  [19-20] 19  BP: (118-150)/(77-80) 146/77  SpO2:  [92 %-93 %] 92 %  Body mass index is 29 36 kg/m²  Input and Output Summary (last 24 hours): Intake/Output Summary (Last 24 hours) at 2023 1221  Last data filed at 2023 1439  Gross per 24 hour   Intake --   Output 200 ml   Net -200 ml       Physical Exam:     Physical Exam  Vitals reviewed  Constitutional:       General: He is not in acute distress  HENT:      Head: Normocephalic  Nose: Nose normal       Mouth/Throat:      Mouth: Mucous membranes are moist    Eyes:      General: No scleral icterus  Cardiovascular:      Rate and Rhythm: Normal rate  Pulmonary:      Effort: Pulmonary effort is normal  No respiratory distress  Abdominal:      General: There is no distension  Palpations: Abdomen is soft  Tenderness: There is no abdominal tenderness  Skin:     General: Skin is warm  Neurological:      Mental Status: He is alert and oriented to person, place, and time  Psychiatric:         Mood and Affect: Mood normal          Behavior: Behavior normal        Additional Data:     Labs:    Results from last 7 days   Lab Units 05/09/23  0604 05/08/23  0427   WBC Thousand/uL 18 95* 16 64*   HEMOGLOBIN g/dL 11 5* 10 6*   HEMATOCRIT % 33 6* 31 2*   PLATELETS Thousands/uL 547* 497*   BANDS PCT % 7  --    NEUTROS PCT %  --  78*   LYMPHS PCT %  --  12*   LYMPHO PCT % 8*  --    MONOS PCT %  --  7   MONO PCT % 6  --    EOS PCT % 0 0     Results from last 7 days   Lab Units 05/09/23  0604 05/05/23  0333 05/04/23  0612   SODIUM mmol/L 126*   < > 132*   POTASSIUM mmol/L 4 3   < > 3 3*   CHLORIDE mmol/L 93*   < > 97   CO2 mmol/L 22   < > 26   BUN mg/dL 26*   < > 19   CREATININE mg/dL 1 53*   < > 1 13   ANION GAP mmol/L 11   < > 9   CALCIUM mg/dL 9 2   < > 8 5   ALBUMIN g/dL  --   --  2 8*   TOTAL BILIRUBIN mg/dL  --   --  0 46   ALK PHOS U/L  --   --  51   ALT U/L  --   --  17   AST U/L  --   --  20   GLUCOSE RANDOM mg/dL 87   < > 99    < > = values in this interval not displayed  Results from last 7 days   Lab Units 05/02/23  1651   POC GLUCOSE mg/dl 89         Results from last 7 days   Lab Units 05/04/23  0612 05/03/23  0530   PROCALCITONIN ng/ml 0 57* 0 74*           * I Have Reviewed All Lab Data Listed Above  * Additional Pertinent Lab Tests Reviewed:  [address] Admission Reviewed      Lines:   Invasive Devices     Peripheral Intravenous Line  Duration           Peripheral IV 05/07/23 Left;Ventral (anterior) Forearm 2 days                   Imaging:    Imaging Reports Reviewed Today Include: no new imaging    Recent Cultures (last 7 days):     Results from last 7 days   Lab Units 05/02/23  1615 05/02/23  1614 05/02/23  1613   GRAM STAIN RESULT  1+ Polys  No bacteria seen No Polys or Bacteria seen No Polys or Bacteria seen       Last 24 Hours Medication List:   Current Facility-Administered Medications   Medication Dose Route Frequency Provider Last Rate   • acetaminophen  650 mg Oral Q6H PRN Nakul Calderon MD     • albuterol  2 puff Inhalation Q4H PRN Nakul Calderon MD     • ALPRAZolam  0 5 mg Oral HS PRN Nakul Calderon MD     • aluminum-magnesium hydroxide-simethicone  30 mL Oral Q6H PRN Nakul Calderon MD     • benzonatate  100 mg Oral TID PRN Nakul Calderon MD     • carbamide peroxide  5 drop Both Ears BID Nakul Calderon MD     • dabigatran etexilate  150 mg Oral Q12H [facility] Nakul Calderon MD     • dexamethasone  4 mg Oral Q8H Nakul Calderon MD     • dextromethorphan-guaiFENesin  10 mL Oral Q4H PRN Nakul Calderon MD     • memantine  10 mg Oral BID Nakul Calderon MD     • metoprolol succinate  12 5 mg Oral Daily Nakul Calderon MD     • ondansetron  4 mg Intravenous Q6H PRN Nakul Calderon MD     • pantoprazole  40 mg Oral Early Morning Nakul Calderon MD     • polyethylene glycol  17 g Oral Daily PRN Nakul Calderon MD     • prazosin  1 mg Oral HS Nakul Calderon MD     • sulfamethoxazole-trimethoprim  2 tablet Oral Q6H Nakul Calderon MD          Today, Patient Was Seen By: Sage Hammond MD    ** Please Note: Dictation voice to text software may have been used in the creation of this document   **

## 2023-05-09 NOTE — ASSESSMENT & PLAN NOTE
History of warm antibody hemolytic anemia  Previously treated with Rituxan (8/2022 last received)  • Continue chronic steroid treatment with Dexamethasone 4mg Q8 hours  But dose might have to be decreased due to hallucinations  Will have hematology oncology reassess need for continued dose  • Appreciate hematology-oncology recommendations

## 2023-05-09 NOTE — CASE MANAGEMENT
Case Management Discharge Planning Note    Patient name Sindy Yo  [facility] 2 /E2 MS 79-* MRN [id number]  : 1955 Date 2023       Current Admission Date: 2023  Current Admission Diagnosis:Acute respiratory failure with hypoxia [facility])   Patient Active Problem List    Diagnosis Date Noted   • Hyponatremia 2023   • Pneumocystis carinii pneumonia ([address] ) 2023   • Ground glass opacity present on imaging of lung 2023   • Generalized weakness 2023   • History of Clostridium difficile infection 2023   • Melanoma ([facility] ) 2023   • Encounter for follow-up surveillance of melanoma 2023   • Lymphocele after surgical procedure 10/06/2022   • Elevated serum creatinine 2022   • Elevated bilirubin 2022   • Leukocytosis 2022   • Dyspnea 2022   • Acute respiratory failure with hypoxia ([address] ) 2022   • Metastatic melanoma ([facility] ) 2022   • Hypercholesterolemia 2021   • Chronic bilateral low back pain with bilateral sciatica 10/08/2021   • Hyperglycemia 2021   • Primary localized osteoarthrosis of the knee, right 2021   • Thrombocytosis 2021   • COVID-19 2020   • Pain in joint, lower leg 11/10/2020   • Primary osteoarthritis of left knee 2020   • Pain in left knee 2020   • Auto immune neutropenia (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   • Glucose intolerance (impaired glucose tolerance) 2020   • Renal insufficiency 2020   • Essential hypertension 2019   • Posttraumatic stress disorder 10/28/2019   • Iron overload due to repeated red blood cell transfusions 2018   • Sepsis ([address] ) 2018   • Autoimmune hemolytic anemia    • Shortness of breath 2017   • Gastroesophageal reflux disease 2017   • Elevated blood pressure reading without diagnosis of hypertension 2017   • Portal vein thrombosis 2017   • History of pulmonary embolism 2017   • Splenomegaly 2017 • Symptomatic anemia 02/03/2017   • 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   • Hyperbilirubinemia 11/02/2016      LOS (days): 9  Geometric Mean LOS (GMLOS) (days): 5 20  Days to GMLOS:-3 6     OBJECTIVE:  Risk of Unplanned Readmission Score: 30 91         Current admission status: Inpatient   Preferred Pharm[facility]:   [id number] [address] [facility] [address] [address] [facility]  Phone: [phone] Fax: [phone]    Primary Care Provider: Concetta Gr DO    Primary Insurance: MEDICARE  Secondary Insurance: AARP    DISCHARGE DETAILS:    Discharge planning discussed with[de-identified] Spouse, Vik Toledo  Freedom of Choice: Yes  Comments - Freedom of Choice: Spouse unsure about rehab at this time  Referrals placed as spouse decides  CM contacted family/caregiver?: Yes  Were Treatment Team discharge recommendations reviewed with patient/caregiver?: Yes  Did patient/caregiver verbalize understanding of patient care needs?: Yes  Were patient/caregiver advised of the risks associated with not following Treatment Team discharge recommendations?: Yes    Contacts  Patient Contacts: Vik Toledo  Relationship to Patient[de-identified] Family  Contact Method: Phone  Phone Number: [phone]  Reason/Outcome: Emergency Contact, Discharge Planning              Other Referral/Resources/Interventions Provided:  Interventions: Acute Rehab, Short Term Rehab  Referral Comments: Referrals placed via aidin         Treatment Team Recommendation: Short Term Rehab                                            Additional Comments: CM spoke with pt's spouse, Vik Toledo, over the phone  Reviewed the recommendation being made at this time for rehab after discharge from the hospital  Pt's spouse is unsure about rehab placement at this time due to pt's increased confusion and fears that rehab placement will make the confusion worse   CM reviewed OT note reporting that pt is an assist x2 at this time and that for safety Vik Toledo would need another person in the home to help assist her with the pt  BAUDILIO asked spouse if she would be agreeable to coming to the hospital to see PT work with the pt to better see where he is at phsyically  Spouse was agreeable  Plan at this time is to continue to pursuing rehab and once TB results come back have spouse come see PT work with the pt in order to determine if she can bring him home safely

## 2023-05-09 NOTE — PHYSICAL THERAPY NOTE
"                                                                                  PHYSICAL THERAPY NOTE          Patient Name: Brenda Long  [id number] Date: 5/9/2023 05/09/23 1308   Note Type   Note Type Treatment for insurance authorization   Pain Assessment   Pain Assessment Tool 0-10   Pain Score No Pain   Restrictions/Precautions   Other Precautions Contact/isolation; Airborne/isolation;Cognitive; Chair Alarm; Bed Alarm;O2;Multiple lines; Fall Risk  (3 L nc o2)   General   Chart Reviewed Yes   Family/Caregiver Present No   Cognition   Overall Cognitive Status Impaired   Arousal/Participation Alert; Cooperative   Attention Attends with cues to redirect   Orientation Level Oriented to person;Oriented to place;Oriented to time  (pt though it was 600 PM instead of 1210 pm )   Memory Decreased recall of precautions;Decreased recall of recent events;Decreased short term memory   Following Commands Follows one step commands without difficulty   Comments pt having visual hallucinations, pt reports seeing his dog sitting on the floor and flies in the room  Subjective   Subjective \" My legs are too weak to walk  \"   Bed Mobility   Supine to Sit 3  Moderate assistance   Additional items Assist x 2;HOB elevated; Increased time required;Verbal cues;LE management   Sit to Supine 2  Maximal assistance   Additional items Assist x 2; Increased time required;LE management;Verbal cues   Additional Comments pt  peformed supported and unsupported longsitting activities in bed of reaching to knees, calves and feet  improved unsupported longsitting with dynamic activities  Transfers   Sit to Stand 3  Moderate assistance   Additional items Assist x 2; Increased time required;Verbal cues   Stand to Sit 3  Moderate assistance   Additional items Assist x 2; Increased time required;Verbal cues   Additional Comments pt  performs static standing with mod assist x2 using arm hold assist x2 for 12-15 seconds, pt demosntrates wbos inability to " weightshifting and take steps at this time, pt demonstrating WBOS, tremors/ shakiness in ue's and le's  b/l knee blocked with static standing  Ambulation/Elevation   Gait pattern Not appropriate   Balance   Static Sitting   (poor- fair sitting balance  mod assist x1 for trunk support due to retropulsion progressing to periods of supervision, verbal and visual cues for improved sitting balance and posture )   Dynamic Sitting   (poor- fair)   Static Standing Poor   Dynamic Standing Poor -   Ambulatory Zero   Endurance Deficit   Endurance Deficit Description abad  spo2 86%-85% on 3l atr rest 95%- 96 % and supine in bed 97% at rest   bp 144/77 seated eob  Activity Tolerance   Activity Tolerance Patient limited by fatigue  (dizziness, abad)   Medical Staff Made Aware Becca CULLEN   Exercises   Heelslides Supine;10 reps;AROM; Bilateral   Hip Flexion Supine;10 reps;AAROM;AROM; Left;Right  (aarom L le and arom R le)   Hip Abduction Supine;10 reps;AROM; Bilateral   Hip Adduction Supine;10 reps;AROM; Bilateral   Knee AROM Short Arc Quad Supine;10 reps;AROM; Bilateral   Ankle Pumps Supine;10 reps;AROM; Bilateral   Balance training  static sitting performed 5-7 minutes eob with mod assist x1 for trunk support due to retropulsion progressing to periods of supervision, verbal and visual cues for improved sitting balance and posture,  static standing with mod assist x2 x 12-15 seconds  Assessment   Prognosis Fair   Problem List Decreased strength;Decreased endurance; Impaired balance;Decreased mobility; Decreased cognition;Decreased safety awareness; Impaired judgement   Assessment Pt seen for PT treatment session this date with interventions consisting of bed mobility and transfer training, sitting balance at eob, and long sitting, static standing balance and tolerance activities  and education provided as needed for safety and direction to improve functional mobility, safety awareness, and activity tolerance   Pt agreeable to PT treatment session upon arrival, pt found supine reports weakness and difficulty breathing  At end of session, pt left supine in bed with all needs in reach  In comparison to previous session, pt with improvement in activity tolerance,  dynamic sitting balance, standing balance and functional mobility  pt  Tolerated eob 5-7 minutes with mod A x1 to supervision  Pt  demonstrates retropulsion requiring trunk support, pt able to correct with verbal and visual cues at times  Pt  pefrorms reaching activities in supported and unsupported long sitting, noted improved long sitting with dynamic activities pt not requiring trunk support however static long sitting unsupported opt requiring trunk support, decreased activity tolerance, fatigues easily and requires frequent rest breaks to PT session  Pt  performs sit to stand with mod assist x2, static standing with mod assist x2 demonstrating WBOS, poor weigh tshifting noted and inability to lift and advance le's to take steps to commode  Static standing tolerance 12-15 seconds  Pt  Performs supine b/l le a-aarom to b/l le's x 10 reps  Please refer to endurance deficit section of flowsheet for vitals  Continue to recommend IP rehab at time of d/c in order to maximize pt's functional independence and safety w/ mobility  Pt continues to be functioning below baseline level  PT will continue to see pt while here in order to address the deficits listed above and provide interventions consistent w/ POC in effort to achieve STGs  The patient's AM-PAC Basic Mobility Inpatient Short Form Raw Score is 7  A raw score less than 16 suggests the patient may benefit from discharge to post-acute rehabilitation services  Please also refer to the recommendation of the Physical Therapist for safe discharge planning  Goals   Patient Goals To get back to [employer]     STG Expiration Date 05/13/23   PT Treatment Day 2   Plan   Treatment/Interventions Functional transfer training;KENDALL strengthening/ROM; Therapeutic exercise; Endurance training;Cognitive reorientation;Patient/family training;Bed mobility;Gait training;OT   Progress Slow progress, decreased activity tolerance   PT Frequency 3-5x/wk   Recommendation   PT Discharge Recommendation Post acute rehabilitation services   AM-PAC Basic Mobility Inpatient   Turning in Flat Bed Without Bedrails 1   Lying on Back to Sitting on Edge of Flat Bed Without Bedrails 1   Moving Bed to Chair 1   Standing Up From Chair Using Arms 2   Walk in Room 1   Climb 3-5 Stairs With Railing 1   Basic Mobility Inpatient Raw Score 7   Turning Head Towards Sound 4   Follow Simple Instructions 4   Low Function Basic Mobility Raw Score  15   Low Function Basic Mobility Standardized Score  23 9   Highest Level Of Mobility   -HL Goal 2: Bed activities/Dependent transfer   -HL Achieved 3: Sit at edge of bed   Education   Education Provided Mobility training;Home exercise program   Patient Reinforcement needed;Demonstrates acceptance/verbal understanding   End of Consult   Patient Position at End of Consult Bed/Chair alarm activated; All needs within reach   End of Consult Comments scd's to b/l le's  05/09/23 7911   Note Type   Note Type Treatment for insurance authorization   Pain Assessment   Pain Assessment Tool 0-10   Pain Score No Pain   Restrictions/Precautions   Other Precautions Contact/isolation; Airborne/isolation;Cognitive; Chair Alarm; Bed Alarm;O2;Multiple lines; Fall Risk  (3 L nc o2)   General   Chart Reviewed Yes   Family/Caregiver Present No   Cognition   Overall Cognitive Status Impaired   Arousal/Participation Alert; Cooperative   Attention Attends with cues to redirect   Orientation Level Oriented to person;Oriented to place;Oriented to time  (pt though it was 600 PM instead of 1210 pm )   Memory Decreased recall of precautions;Decreased recall of recent events;Decreased short term memory   Following Commands Follows one step commands without "difficulty   Comments pt having visual hallucinations, pt reports seeing his dog sitting on the floor and flies in the room  Subjective   Subjective \" My legs are too weak to walk  \"   Bed Mobility   Supine to Sit 3  Moderate assistance   Additional items Assist x 2;HOB elevated; Increased time required;Verbal cues;LE management   Sit to Supine 2  Maximal assistance   Additional items Assist x 2; Increased time required;LE management;Verbal cues   Additional Comments pt  peformed supported and unsupported longsitting activities in bed of reaching to knees, calves and feet  improved unsupported longsitting with dynamic activities  Transfers   Sit to Stand 3  Moderate assistance   Additional items Assist x 2; Increased time required;Verbal cues   Stand to Sit 3  Moderate assistance   Additional items Assist x 2; Increased time required;Verbal cues   Additional Comments pt  performs static standing with mod assist x2 using arm hold assist x2 for 12-15 seconds, pt demosntrates wbos inability to weightshifting and take steps at this time, pt demonstrating WBOS, tremors/ shakiness in ue's and le's  b/l knee blocked with static standing  Ambulation/Elevation   Gait pattern Not appropriate   Balance   Static Sitting   (poor- fair sitting balance  mod assist x1 for trunk support due to retropulsion progressing to periods of supervision, verbal and visual cues for improved sitting balance and posture )   Dynamic Sitting   (poor- fair)   Static Standing Poor   Dynamic Standing Poor -   Ambulatory Zero   Endurance Deficit   Endurance Deficit Description abad  spo2 86%-85% on 3l atr rest 95%- 96 % and supine in bed 97% at rest   bp 144/77 seated eob  Activity Tolerance   Activity Tolerance Patient limited by fatigue  (dizziness, abad)   Medical Staff Made Aware Aly CULLEN   Exercises   Heelslides Supine;10 reps;AROM; Bilateral   Hip Flexion Supine;10 reps;AAROM;AROM; Left;Right  (aarom L le and arom R le)   Hip Abduction " Supine;10 reps;AROM; Bilateral   Hip Adduction Supine;10 reps;AROM; Bilateral   Knee AROM Short Arc Quad Supine;10 reps;AROM; Bilateral   Ankle Pumps Supine;10 reps;AROM; Bilateral   Balance training  static sitting performed 5-7 minutes eob with mod assist x1 for trunk support due to retropulsion progressing to periods of supervision, verbal and visual cues for improved sitting balance and posture,  static standing with mod assist x2 x 12-15 seconds  Assessment   Prognosis Fair   Problem List Decreased strength;Decreased endurance; Impaired balance;Decreased mobility; Decreased cognition;Decreased safety awareness; Impaired judgement   Assessment Pt seen for PT treatment session this date with interventions consisting of bed mobility and transfer training, sitting balance at eob, and long sitting, static standing balance and tolerance activities  and education provided as needed for safety and direction to improve functional mobility, safety awareness, and activity tolerance  Pt agreeable to PT treatment session upon arrival, pt found supine reports weakness and difficulty breathing  At end of session, pt left supine in bed with all needs in reach  In comparison to previous session, pt with improvement in activity tolerance,  dynamic sitting balance, standing balance and functional mobility  pt  Tolerated eob 5-7 minutes with mod A x1 to supervision  Pt  demonstrates retropulsion requiring trunk support, pt able to correct with verbal and visual cues at times  Pt  pefrorms reaching activities in supported and unsupported long sitting, noted improved long sitting with dynamic activities pt not requiring trunk support however static long sitting unsupported opt requiring trunk support, decreased activity tolerance, fatigues easily and requires frequent rest breaks to PT session    Pt  performs sit to stand with mod assist x2, static standing with mod assist x2 demonstrating WBOS, poor weigh tshifting noted and inability to lift and advance le's to take steps to commode  Static standing tolerance 12-15 seconds  Pt  Performs supine b/l le a-aarom to b/l le's x 10 reps  Please refer to endurance deficit section of flowsheet for vitals  Continue to recommend IP rehab at time of d/c in order to maximize pt's functional independence and safety w/ mobility  Pt continues to be functioning below baseline level  PT will continue to see pt while here in order to address the deficits listed above and provide interventions consistent w/ POC in effort to achieve STGs  The patient's AM-[facility] Basic Mobility Inpatient Short Form Raw Score is 7  A raw score less than 16 suggests the patient may benefit from discharge to post-acute rehabilitation services  Please also refer to the recommendation of the Physical Therapist for safe discharge planning  Goals   Patient Goals To get back to [employer]  STG Expiration Date 05/13/23   PT Treatment Day 2   Plan   Treatment/Interventions Functional transfer training;LE strengthening/ROM; Therapeutic exercise; Endurance training;Cognitive reorientation;Patient/family training;Bed mobility;Gait training;OT   Progress Slow progress, decreased activity tolerance   PT Frequency 3-5x/wk   Recommendation   PT Discharge Recommendation Post acute rehabilitation services   AM-PAC Basic Mobility Inpatient   Turning in Flat Bed Without Bedrails 1   Lying on Back to Sitting on Edge of Flat Bed Without Bedrails 1   Moving Bed to Chair 1   Standing Up From Chair Using Arms 2   Walk in Room 1   Climb 3-5 Stairs With Railing 1   Basic Mobility Inpatient Raw Score 7   Turning Head Towards Sound 4   Follow Simple Instructions 4   Low Function Basic Mobility Raw Score  15   Low Function Basic Mobility Standardized Score  23 9   Highest Level Of Mobility   JH-HLM Goal 2: Bed activities/Dependent transfer   JH-HLM Achieved 3: Sit at edge of bed   Education   Education Provided Mobility training;Home exercise program   Patient Reinforcement needed;Demonstrates acceptance/verbal understanding   End of Consult   Patient Position at End of Consult Bed/Chair alarm activated; All needs within reach   End of Consult Comments scd's to antonio/shelley Saavedra PTA

## 2023-05-09 NOTE — PLAN OF CARE
"  Problem: OCCUPATIONAL THERAPY ADULT  Goal: Performs self-care activities at highest level of function for planned discharge setting  See evaluation for individualized goals  Description: Treatment Interventions: ADL retraining, Functional transfer training, UE strengthening/ROM, Endurance training, Patient/family training, Equipment evaluation/education, Neuromuscular reeducation, Compensatory technique education, Energy conservation, Activityengagement          See flowsheet documentation for full assessment, interventions and recommendations  Outcome: Progressing  Note: Limitation: Decreased ADL status, Decreased UE strength, Decreased Safe judgement during ADL, Decreased endurance, Decreased self-care trans  Prognosis: Good  Assessment: Pt seen for OT treatment session focusing on functional activity tolerance, bed mobility, ADLs, functional transfers/mobility and energy conservation education  Pt lying supine at start of session, agreeable to OT treatment session w/ increased encouragement from therapists  Bed mobility (supine>sit) completed w/ Mod A of 2 with assist for LE management and trunk support  Pt able to tolerate approx 5-7 mins sitting EOB  Pt retropulsive sitting EOB and w/ Poor static seated balance, requiring Mod A to maintain upright seated position  With max verbal cues, pt able to progress to Fair- static seated balance  Pt c/o dizziness initially upon sitting EOB  BP: 147/77, SpO2: 95-96% on 3L O2  Pt noted w/ visual hallucinations EOB, stating \"Do you see my dog sitting there? There's a bunch of flies in here  \" Pt requesting to use BSC  Transfers (sit<>stand) completed w/ Mod A of 2 with assist to initiate forward weight shift from surface for sit>stand and assist for controlled descent to surface for stand>sit  Pt required verbal cues for safe technique, hand placement, and posture   Pt noted w/ increased tremors/shakiness upon standing, unable to take steps at this time and requesting " to return to supine position 2* SOB  SpO2 post-activity: 85-86% on 2L O2, increasing to 91-97% w/ rest break  Max A of 2 required for sit>supine with assist for B/L LE management and trunk support  ADL routine completed while in seated position in bed  UB ADLs completed w/ Min A with assist for thoroughness for UB bathing and assist to bring gown over shldrs/down in back for UB dressing  LB bathing completed w/ Mod A w/ limited functional reach noted to wash B/L lower legs and feet  LB dressing completed w/ Max A with limited functional reach noted to don/doff B/L socks  Pt able to doff L sock w/ supervision and increased time/effort, however required assist to doff R sock and don B/L socks  Pt lying supine at end of session w/ PTA present  All needs met and pt reports no further questions for OT at this time  Continue to recommend STR when medically cleared  OT to follow pt on caseload       OT Discharge Recommendation: Post acute rehabilitation services

## 2023-05-09 NOTE — PLAN OF CARE
Problem: PHYSICAL THERAPY ADULT  Goal: Performs mobility at highest level of function for planned discharge setting  See evaluation for individualized goals  Description: Treatment/Interventions: Functional transfer training, LE strengthening/ROM, Elevations, Therapeutic exercise, Endurance training, Patient/family training, Equipment eval/education, Bed mobility, Gait training, Compensatory technique education, Continued evaluation, Spoke to nursing, OT  Equipment Recommended: Other (Comment) (monitor needs with progress )       See flowsheet documentation for full assessment, interventions and recommendations  Outcome: Progressing  Note: Prognosis: Fair  Problem List: Decreased strength, Decreased endurance, Impaired balance, Decreased mobility, Decreased cognition, Decreased safety awareness, Impaired judgement  Assessment: Pt seen for PT treatment session this date with interventions consisting of bed mobility and transfer training, sitting balance at eob, and long sitting, static standing balance and tolerance activities  and education provided as needed for safety and direction to improve functional mobility, safety awareness, and activity tolerance  Pt agreeable to PT treatment session upon arrival, pt found supine reports weakness and difficulty breathing  At end of session, pt left supine in bed with all needs in reach  In comparison to previous session, pt with improvement in activity tolerance,  dynamic sitting balance, standing balance and functional mobility  pt  Tolerated eob 5-7 minutes with mod A x1 to supervision  Pt  demonstrates retropulsion requiring trunk support, pt able to correct with verbal and visual cues at times   Pt  pefrorms reaching activities in supported and unsupported long sitting, noted improved long sitting with dynamic activities pt not requiring trunk support however static long sitting unsupported opt requiring trunk support, decreased activity tolerance, fatigues easily and requires frequent rest breaks to PT session  Pt  performs sit to stand with mod assist x2, static standing with mod assist x2 demonstrating WBOS, poor weigh tshifting noted and inability to lift and advance le's to take steps to commode  Static standing tolerance 12-15 seconds  Pt  Performs supine b/l le a-aarom to b/l le's x 10 reps  Please refer to endurance deficit section of flowsheet for vitals  Continue to recommend IP rehab at time of d/c in order to maximize pt's functional independence and safety w/ mobility  Pt continues to be functioning below baseline level  PT will continue to see pt while here in order to address the deficits listed above and provide interventions consistent w/ POC in effort to achieve STGs  The patient's AM-PAC Basic Mobility Inpatient Short Form Raw Score is 7  A raw score less than 16 suggests the patient may benefit from discharge to post-acute rehabilitation services  Please also refer to the recommendation of the Physical Therapist for safe discharge planning  Barriers to Discharge: None  Barriers to Discharge Comments: stairs  PT Discharge Recommendation: Post acute rehabilitation services    See flowsheet documentation for full assessment

## 2023-05-09 NOTE — ASSESSMENT & PLAN NOTE
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

## 2023-05-09 NOTE — PLAN OF CARE
Problem: Potential for Falls  Goal: Patient will remain free of falls  Description: INTERVENTIONS:  - Educate patient/family on patient safety including physical limitations  - Instruct patient to call for assistance with activity   - Consult OT/PT to assist with strengthening/mobility   - Keep Call bell within reach  - Keep bed low and locked with side rails adjusted as appropriate  - Keep care items and personal belongings within reach  - Initiate and maintain comfort rounds  - Make Fall Risk Sign visible to staff  - Offer Toileting every  Hours, in advance of need  - Initiate/Maintain alarm  - Obtain necessary fall risk management equipment: - Apply yellow socks and bracelet for high fall risk patients  - Consider moving patient to room near nurses station  Outcome: Progressing     Problem: MOBILITY - ADULT  Goal: Maintain or return to baseline ADL function  Description: INTERVENTIONS:  -  Assess patient's ability to carry out ADLs; assess patient's baseline for ADL function and identify physical deficits which impact ability to perform ADLs (bathing, care of mouth/teeth, toileting, grooming, dressing, etc )  - Assess/evaluate cause of self-care deficits   - Assess range of motion  - Assess patient's mobility; develop plan if impaired  - Assess patient's need for assistive devices and provide as appropriate  - Encourage maximum independence but intervene and supervise when necessary  - Involve family in performance of ADLs  - Assess for home care needs following discharge   - Consider OT consult to assist with ADL evaluation and planning for discharge  - Provide patient education as appropriate  Outcome: Progressing  Goal: Maintains/Returns to pre admission functional level  Description: INTERVENTIONS:  - Perform BMAT or MOVE assessment daily    - Set and communicate daily mobility goal to care team and patient/family/caregiver     - Collaborate with rehabilitation services on mobility goals if consulted  - Perform Range of Motion  times a day  - Reposition patient every  hours    - Dangle patient  times a day  - Stand patient  times a day  - Ambulate patient  times a day  - Out of bed to chair  times a day   - Out of bed for meals  times a day  - Out of bed for toileting  - Record patient progress and toleration of activity level   Outcome: Progressing     Problem: INFECTION - ADULT  Goal: Absence or prevention of progression during hospitalization  Description: INTERVENTIONS:  - Assess and monitor for signs and symptoms of infection  - Monitor lab/diagnostic results  - Monitor all insertion sites, i e  indwelling lines, tubes, and drains  - Monitor endotracheal if appropriate and nasal secretions for changes in amount and color  - [place] appropriate cooling/warming therapies per order  - Administer medications as ordered  - Instruct and encourage patient and family to use good hand hygiene technique  - Identify and instruct in appropriate isolation precautions for identified infection/condition  Outcome: Progressing  Goal: Absence of fever/infection during neutropenic period  Description: INTERVENTIONS:  - Monitor WBC    Outcome: Progressing     Problem: SAFETY ADULT  Goal: Patient will remain free of falls  Description: INTERVENTIONS:  - Educate patient/family on patient safety including physical limitations  - Instruct patient to call for assistance with activity   - Consult OT/PT to assist with strengthening/mobility   - Keep Call bell within reach  - Keep bed low and locked with side rails adjusted as appropriate  - Keep care items and personal belongings within reach  - Initiate and maintain comfort rounds  - Make Fall Risk Sign visible to staff  - Offer Toileting every  Hours, in advance of need  - Initiate/Maintain alarm  - Obtain necessary fall risk management equipment:   - Apply yellow socks and bracelet for high fall risk patients  - Consider moving patient to room near nurses station  Outcome: Progressing  Goal: Maintain or return to baseline ADL function  Description: INTERVENTIONS:  -  Assess patient's ability to carry out ADLs; assess patient's baseline for ADL function and identify physical deficits which impact ability to perform ADLs (bathing, care of mouth/teeth, toileting, grooming, dressing, etc )  - Assess/evaluate cause of self-care deficits   - Assess range of motion  - Assess patient's mobility; develop plan if impaired  - Assess patient's need for assistive devices and provide as appropriate  - Encourage maximum independence but intervene and supervise when necessary  - Involve family in performance of ADLs  - Assess for home care needs following discharge   - Consider OT consult to assist with ADL evaluation and planning for discharge  - Provide patient education as appropriate  Outcome: Progressing  Goal: Maintains/Returns to pre admission functional level  Description: INTERVENTIONS:  - Perform BMAT or MOVE assessment daily    - Set and communicate daily mobility goal to care team and patient/family/caregiver  - Collaborate with rehabilitation services on mobility goals if consulted  - Perform Range of Motion  times a day  - Reposition patient every urs    - Dangle patient  times a day  - Stand patient  times a day  - Ambulate patient  times a day  - Out of bed to chair  times a day   - Out of bed for meals  times a day  - Out of bed for toileting  - Record patient progress and toleration of activity level   Outcome: Progressing     Problem: CARDIOVASCULAR - ADULT  Goal: Maintains optimal cardiac output and hemodynamic stability  Description: INTERVENTIONS:  - Monitor I/O, vital signs and rhythm  - Monitor for S/S and trends of decreased cardiac output  - Administer and titrate ordered vasoactive medications to optimize hemodynamic stability  - Assess quality of pulses, skin color and temperature  - Assess for signs of decreased coronary artery perfusion  - Instruct patient to report change in severity of symptoms  Outcome: Progressing  Goal: Absence of cardiac dysrhythmias or at baseline rhythm  Description: INTERVENTIONS:  - Continuous cardiac monitoring, vital signs, obtain 12 lead EKG if ordered  - Administer antiarrhythmic and heart rate control medications as ordered  - Monitor electrolytes and administer replacement therapy as ordered  Outcome: Progressing     Problem: GASTROINTESTINAL - ADULT  Goal: Minimal or absence of nausea and/or vomiting  Description: INTERVENTIONS:  - Administer IV fluids if ordered to ensure adequate hydration  - Maintain NPO status until nausea and vomiting are resolved  - Nasogastric tube if ordered  - Administer ordered antiemetic medications as needed  - Provide nonpharmacologic comfort measures as appropriate  - Advance diet as tolerated, if ordered  - Consider nutrition services referral to assist patient with adequate nutrition and appropriate food choices  Outcome: Progressing  Goal: Maintains or returns to baseline bowel function  Description: INTERVENTIONS:  - Assess bowel function  - Encourage oral fluids to ensure adequate hydration  - Administer IV fluids if ordered to ensure adequate hydration  - Administer ordered medications as needed  - Encourage mobilization and activity  - Consider nutritional services referral to assist patient with adequate nutrition and appropriate food choices  Outcome: Progressing  Goal: Maintains adequate nutritional intake  Description: INTERVENTIONS:  - Monitor percentage of each meal consumed  - Identify factors contributing to decreased intake, treat as appropriate  - Assist with meals as needed  - Monitor I&O, weight, and lab values if indicated  - Obtain nutrition services referral as needed  Outcome: Progressing  Goal: Establish and maintain optimal ostomy function  Description: INTERVENTIONS:  - Assess bowel function  - Encourage oral fluids to ensure adequate hydration  - Administer IV fluids if ordered to ensure adequate hydration   - Administer ordered medications as needed  - Encourage mobilization and activity  - Nutrition services referral to assist patient with appropriate food choices  - Assess stoma site  - Consider wound care consult   Outcome: Progressing  Goal: Oral mucous membranes remain intact  Description: INTERVENTIONS  - Assess oral mucosa and hygiene practices  - Implement preventative oral hygiene regimen  - Implement oral medicated treatments as ordered  - Initiate Nutrition services referral as needed  Outcome: Progressing

## 2023-05-10 PROBLEM — R44.3 HALLUCINATION: Status: ACTIVE | Noted: 2023-01-01

## 2023-05-10 NOTE — ARC ADMISSION
Reviewed updates with [facility] physician - requesting PM&R consult for further review/recommendations at this time, as well as assess patient's tolerance for aggressive rehab program  CM has been updated  Will continue to follow at this time

## 2023-05-10 NOTE — TELEPHONE ENCOUNTER
Discussed with Dr Romayne Hanks  She has been in contact with [facility] who is starting to taper dexamethasone inpatient d/t side effects  Returned call to patient's wife Anna Fields and made her aware of plan  Anna Fields verbalized understanding

## 2023-05-10 NOTE — ASSESSMENT & PLAN NOTE
History of warm antibody hemolytic anemia  Previously treated with Rituxan (8/2022 last received)  • Continue chronic steroid treatment with Dexamethasone  Today 4mg Q8 hours to Q12 hours  • Appreciate hematology-oncology recommendations

## 2023-05-10 NOTE — PLAN OF CARE
Problem: Potential for Falls  Goal: Patient will remain free of falls  Description: INTERVENTIONS:  - Educate patient/family on patient safety including physical limitations  - Instruct patient to call for assistance with activity   - Consult OT/PT to assist with strengthening/mobility   - Keep Call bell within reach  - Keep bed low and locked with side rails adjusted as appropriate  - Keep care items and personal belongings within reach  - Initiate and maintain comfort rounds  - Make Fall Risk Sign visible to staff  - Offer Toileting every  Hours, in advance of need  - Initiate/Maintain alarm  - Obtain necessary fall risk management equipment:   - Apply yellow socks and bracelet for high fall risk patients  - Consider moving patient to room near nurses station  Outcome: Progressing     Problem: MOBILITY - ADULT  Goal: Maintain or return to baseline ADL function  Description: INTERVENTIONS:  -  Assess patient's ability to carry out ADLs; assess patient's baseline for ADL function and identify physical deficits which impact ability to perform ADLs (bathing, care of mouth/teeth, toileting, grooming, dressing, etc )  - Assess/evaluate cause of self-care deficits   - Assess range of motion  - Assess patient's mobility; develop plan if impaired  - Assess patient's need for assistive devices and provide as appropriate  - Encourage maximum independence but intervene and supervise when necessary  - Involve family in performance of ADLs  - Assess for home care needs following discharge   - Consider OT consult to assist with ADL evaluation and planning for discharge  - Provide patient education as appropriate  Outcome: Progressing  Goal: Maintains/Returns to pre admission functional level  Description: INTERVENTIONS:  - Perform BMAT or MOVE assessment daily    - Set and communicate daily mobility goal to care team and patient/family/caregiver     - Collaborate with rehabilitation services on mobility goals if consulted  - Perform Range of Motion  times a day  - Reposition patient every  hours    - Dangle patient  times a day  - Stand patient  times a day  - Ambulate patient  times a day  - Out of bed to chair  times a day   - Out of bed for meals  times a day  - Out of bed for toileting  - Record patient progress and toleration of activity level   Outcome: Progressing     Problem: INFECTION - ADULT  Goal: Absence or prevention of progression during hospitalization  Description: INTERVENTIONS:  - Assess and monitor for signs and symptoms of infection  - Monitor lab/diagnostic results  - Monitor all insertion sites, i e  indwelling lines, tubes, and drains  - Monitor endotracheal if appropriate and nasal secretions for changes in amount and color  - [place] appropriate cooling/warming therapies per order  - Administer medications as ordered  - Instruct and encourage patient and family to use good hand hygiene technique  - Identify and instruct in appropriate isolation precautions for identified infection/condition  Outcome: Progressing  Goal: Absence of fever/infection during neutropenic period  Description: INTERVENTIONS:  - Monitor WBC    Outcome: Progressing     Problem: SAFETY ADULT  Goal: Patient will remain free of falls  Description: INTERVENTIONS:  - Educate patient/family on patient safety including physical limitations  - Instruct patient to call for assistance with activity   - Consult OT/PT to assist with strengthening/mobility   - Keep Call bell within reach  - Keep bed low and locked with side rails adjusted as appropriate  - Keep care items and personal belongings within reach  - Initiate and maintain comfort rounds  - Make Fall Risk Sign visible to staff  - Offer Toileting every  Hours, in advance of need  - Initiate/Maintain alarm  - Obtain necessary fall risk management equipment:   - Apply yellow socks and bracelet for high fall risk patients  - Consider moving patient to room near nurses station  Outcome: Progressing  Goal: Maintain or return to baseline ADL function  Description: INTERVENTIONS:  -  Assess patient's ability to carry out ADLs; assess patient's baseline for ADL function and identify physical deficits which impact ability to perform ADLs (bathing, care of mouth/teeth, toileting, grooming, dressing, etc )  - Assess/evaluate cause of self-care deficits   - Assess range of motion  - Assess patient's mobility; develop plan if impaired  - Assess patient's need for assistive devices and provide as appropriate  - Encourage maximum independence but intervene and supervise when necessary  - Involve family in performance of ADLs  - Assess for home care needs following discharge   - Consider OT consult to assist with ADL evaluation and planning for discharge  - Provide patient education as appropriate  Outcome: Progressing  Goal: Maintains/Returns to pre admission functional level  Description: INTERVENTIONS:  - Perform BMAT or MOVE assessment daily    - Set and communicate daily mobility goal to care team and patient/family/caregiver  - Collaborate with rehabilitation services on mobility goals if consulted  - Perform Range of Motion  times a day  - Reposition patient every  hours    - Dangle patient  times a day  - Stand patient  times a day  - Ambulate patient  times a day  - Out of bed to chair  times a day   - Out of bed for meals times a day  - Out of bed for toileting  - Record patient progress and toleration of activity level   Outcome: Progressing     Problem: CARDIOVASCULAR - ADULT  Goal: Maintains optimal cardiac output and hemodynamic stability  Description: INTERVENTIONS:  - Monitor I/O, vital signs and rhythm  - Monitor for S/S and trends of decreased cardiac output  - Administer and titrate ordered vasoactive medications to optimize hemodynamic stability  - Assess quality of pulses, skin color and temperature  - Assess for signs of decreased coronary artery perfusion  - Instruct patient to report change in severity of symptoms  Outcome: Progressing  Goal: Absence of cardiac dysrhythmias or at baseline rhythm  Description: INTERVENTIONS:  - Continuous cardiac monitoring, vital signs, obtain 12 lead EKG if ordered  - Administer antiarrhythmic and heart rate control medications as ordered  - Monitor electrolytes and administer replacement therapy as ordered  Outcome: Progressing     Problem: RESPIRATORY - ADULT  Goal: Achieves optimal ventilation and oxygenation  Description: INTERVENTIONS:  - Assess for changes in respiratory status  - Assess for changes in mentation and behavior  - Position to facilitate oxygenation and minimize respiratory effort  - Oxygen administered by appropriate delivery if ordered  - Initiate smoking cessation education as indicated  - Encourage broncho-pulmonary hygiene including cough, deep breathe, Incentive Spirometry  - Assess the need for suctioning and aspirate as needed  - Assess and instruct to report SOB or any respiratory difficulty  - Respiratory Therapy support as indicated  Outcome: Progressing     Problem: GASTROINTESTINAL - ADULT  Goal: Minimal or absence of nausea and/or vomiting  Description: INTERVENTIONS:  - Administer IV fluids if ordered to ensure adequate hydration  - Maintain NPO status until nausea and vomiting are resolved  - Nasogastric tube if ordered  - Administer ordered antiemetic medications as needed  - Provide nonpharmacologic comfort measures as appropriate  - Advance diet as tolerated, if ordered  - Consider nutrition services referral to assist patient with adequate nutrition and appropriate food choices  Outcome: Progressing  Goal: Maintains or returns to baseline bowel function  Description: INTERVENTIONS:  - Assess bowel function  - Encourage oral fluids to ensure adequate hydration  - Administer IV fluids if ordered to ensure adequate hydration  - Administer ordered medications as needed  - Encourage mobilization and activity  - Consider nutritional services referral to assist patient with adequate nutrition and appropriate food choices  Outcome: Progressing  Goal: Maintains adequate nutritional intake  Description: INTERVENTIONS:  - Monitor percentage of each meal consumed  - Identify factors contributing to decreased intake, treat as appropriate  - Assist with meals as needed  - Monitor I&O, weight, and lab values if indicated  - Obtain nutrition services referral as needed  Outcome: Progressing  Goal: Establish and maintain optimal ostomy function  Description: INTERVENTIONS:  - Assess bowel function  - Encourage oral fluids to ensure adequate hydration  - Administer IV fluids if ordered to ensure adequate hydration   - Administer ordered medications as needed  - Encourage mobilization and activity  - Nutrition services referral to assist patient with appropriate food choices  - Assess stoma site  - Consider wound care consult   Outcome: Progressing  Goal: Oral mucous membranes remain intact  Description: INTERVENTIONS  - Assess oral mucosa and hygiene practices  - Implement preventative oral hygiene regimen  - Implement oral medicated treatments as ordered  - Initiate Nutrition services referral as needed  Outcome: Progressing     Problem: METABOLIC, FLUID AND ELECTROLYTES - ADULT  Goal: Electrolytes maintained within normal limits  Description: INTERVENTIONS:  - Monitor labs and assess patient for signs and symptoms of electrolyte imbalances  - Administer electrolyte replacement as ordered  - Monitor response to electrolyte replacements, including repeat lab results as appropriate  - Instruct patient on fluid and nutrition as appropriate  Outcome: Progressing  Goal: Fluid balance maintained  Description: INTERVENTIONS:  - Monitor labs   - Monitor I/O and WT  - Instruct patient on fluid and nutrition as appropriate  - Assess for signs & symptoms of volume excess or deficit  Outcome: Progressing  Goal: Glucose maintained within target range  Description: INTERVENTIONS:  - Monitor Blood Glucose as ordered  - Assess for signs and symptoms of hyperglycemia and hypoglycemia  - Administer ordered medications to maintain glucose within target range  - Assess nutritional intake and initiate nutrition service referral as needed  Outcome: Progressing     Problem: Prexisting or High Potential for Compromised Skin Integrity  Goal: Skin integrity is maintained or improved  Description: INTERVENTIONS:  - Identify patients at risk for skin breakdown  - Assess and monitor skin integrity  - Assess and monitor nutrition and hydration status  - Monitor labs   - Assess for incontinence   - Turn and reposition patient  - Assist with mobility/ambulation  - Relieve pressure over bony prominences  - Avoid friction and shearing  - Provide appropriate hygiene as needed including keeping skin clean and dry  - Evaluate need for skin moisturizer/barrier cream  - Collaborate with interdisciplinary team   - Patient/family teaching  - Consider wound care consult   Outcome: Progressing     Problem: Nutrition/Hydration-ADULT  Goal: Nutrient/Hydration intake appropriate for improving, restoring or maintaining nutritional needs  Description: Monitor and assess patient's nutrition/hydration status for malnutrition  Collaborate with interdisciplinary team and initiate plan and interventions as ordered  Monitor patient's weight and dietary intake as ordered or per policy  Utilize nutrition screening tool and intervene as necessary  Determine patient's food preferences and provide high-protein, high-caloric foods as appropriate       INTERVENTIONS:  - Monitor oral intake, urinary output, labs, and treatment plans  - Assess nutrition and hydration status and recommend course of action  - Evaluate amount of meals eaten  - Assist patient with eating if necessary   - Allow adequate time for meals  - Recommend/ encourage appropriate diets, oral nutritional supplements, and vitamin/mineral supplements  - Order, calculate, and assess calorie counts as needed  - Recommend, monitor, and adjust tube feedings and TPN/PPN based on assessed needs  - Assess need for intravenous fluids  - Provide specific nutrition/hydration education as appropriate  - Include patient/family/caregiver in decisions related to nutrition  Outcome: Progressing

## 2023-05-10 NOTE — PROGRESS NOTES
[address] [place] [address]  Progress Note  Name: Marianne Villa  MRN: [id number]  Unit/Bed#: [place] 2 -01 I Date of Admission: 4/30/2023   Date of Service: 5/10/2023 I Hospital Day: 10    Assessment/Plan   * Acute respiratory failure with hypoxia [facility])  Assessment & Plan  69-year-old male presenting with cough, chest tightness, shortness of breath, and generalized weakness likely secondary to Pneomocystis Carinii Pneumonia (Diagnosed through bronchoscopy, BAL positive for this)  • Improved  Currently on room air    Hallucination  Assessment & Plan  Possible steroids, bactrim, or hyponatremia  · Steroid / bactrim dose decreased    Pneumocystis carinii pneumonia ([facility] [address] )  Assessment & Plan  Positive BAL for Pneumocystis carinii pneumonia  Risk factors in the includes steroid use in the setting of metastatic malignancy and possible BRAF therapy  · Antibiotic management per infectious disease  Previously on IV TMP-SMX and steroids, now on p o  Bactrim to complete 21-day course through 5/24/2023  · Monitor BMP and CMP  · Infectious disease recommending 21 days of adjunctive corticosteroids for his hypoxia  Patient currently on chronic Decadron which they state will be sufficient for this purpose  Autoimmune hemolytic anemia  Assessment & Plan  History of warm antibody hemolytic anemia  Previously treated with Rituxan (8/2022 last received)  • Continue chronic steroid treatment with Dexamethasone  Today 4mg Q8 hours to Q12 hours  • Appreciate hematology-oncology recommendations  History of pulmonary embolism  Assessment & Plan  Maintained on Pradaxa    Hyponatremia  Assessment & Plan  Sodium levels downtrending  Hydrohlothiazide held     · Fluid restriction    Recent Labs     05/08/23  0427 05/09/23  0604 05/10/23  0458   SODIUM 126* 126* 125*     Results from last 7 days   Lab Units 05/09/23  1630   OSMO UR mmol/   SODIUM UR  86         History of Clostridium difficile infection  Assessment & Plan  Hx of diverticulitis, C  Diff in the past  C  Diff PCR positive, but negative toxins  • Continue po vancomycin prophylaxis    Metastatic melanoma [facility])  Assessment & Plan  Biopsy confirmed 3/17/2023  Right peritoneum consistent with malignant melanoma with brain mets  BRAF mutation  · Encorafenib and Binimetinib started 2023 (Currently on hold)           VTE Pharmacologic Prophylaxis:   Pharmacologic: Dabigatran (Pradaxa)  Mechanical VTE Prophylaxis in Place: Yes    Discussions with Specialists or Other Care Team Provider: nursing    Education and Discussions with Family / Patient: patient    Current Length of Stay: 10 day(s)    Current Patient Status: Inpatient   Certification Statement: The patient will continue to require additional inpatient hospital stay due to hallucination, hyponatremia    Discharge Plan: active    Code Status: Level 1 - Full Code      Subjective:   Patient seen and examined at bedside  Patient reports hallucinations still  Objective:     Vitals:   Temp (24hrs), Av 2 °F (36 8 °C), Min:97 7 °F (36 5 °C), Max:98 8 °F (37 1 °C)    Temp:  [97 7 °F (36 5 °C)-98 8 °F (37 1 °C)] 97 9 °F (36 6 °C)  HR:  [74-91] 74  Resp:  [16-20] 16  BP: (121-150)/(70-76) 130/76  SpO2:  [91 %-96 %] 96 %  Body mass index is 29 36 kg/m²  Input and Output Summary (last 24 hours): Intake/Output Summary (Last 24 hours) at 5/10/2023 1301  Last data filed at 5/10/2023 0916  Gross per 24 hour   Intake 240 ml   Output 650 ml   Net -410 ml       Physical Exam:     Physical Exam  Vitals reviewed  Constitutional:       General: He is not in acute distress  HENT:      Head: Normocephalic  Nose: Nose normal       Mouth/Throat:      Mouth: Mucous membranes are moist    Eyes:      General: No scleral icterus  Cardiovascular:      Rate and Rhythm: Normal rate  Pulmonary:      Effort: Pulmonary effort is normal  No respiratory distress     Abdominal:      General: There is no distension  Palpations: Abdomen is soft  Tenderness: There is no abdominal tenderness  Skin:     General: Skin is warm  Neurological:      Mental Status: He is alert  Psychiatric:         Mood and Affect: Mood normal          Behavior: Behavior normal        Additional Data:     Labs:    Results from last 7 days   Lab Units 05/10/23  0458 05/09/23  0604 05/08/23  0427   WBC Thousand/uL 18 10* 18 95* 16 64*   HEMOGLOBIN g/dL 11 0* 11 5* 10 6*   HEMATOCRIT % 32 0* 33 6* 31 2*   PLATELETS Thousands/uL 609* 547* 497*   BANDS PCT %  --  7  --    NEUTROS PCT %  --   --  78*   LYMPHS PCT %  --   --  12*   LYMPHO PCT %  --  8*  --    MONOS PCT %  --   --  7   MONO PCT %  --  6  --    EOS PCT %  --  0 0     Results from last 7 days   Lab Units 05/10/23  0458 05/05/23  0333 05/04/23  0612   SODIUM mmol/L 125*   < > 132*   POTASSIUM mmol/L 4 5   < > 3 3*   CHLORIDE mmol/L 94*   < > 97   CO2 mmol/L 21   < > 26   BUN mg/dL 32*   < > 19   CREATININE mg/dL 1 62*   < > 1 13   ANION GAP mmol/L 10   < > 9   CALCIUM mg/dL 9 1   < > 8 5   ALBUMIN g/dL  --   --  2 8*   TOTAL BILIRUBIN mg/dL  --   --  0 46   ALK PHOS U/L  --   --  51   ALT U/L  --   --  17   AST U/L  --   --  20   GLUCOSE RANDOM mg/dL 96   < > 99    < > = values in this interval not displayed  Results from last 7 days   Lab Units 05/04/23  0612   PROCALCITONIN ng/ml 0 57*           * I Have Reviewed All Lab Data Listed Above  * Additional Pertinent Lab Tests Reviewed:  [address] Admission Reviewed      Lines:   Invasive Devices     Peripheral Intravenous Line  Duration           Peripheral IV 05/07/23 Left;Ventral (anterior) Forearm 3 days                   Imaging:    Imaging Reports Reviewed Today Include: no new imaging    Recent Cultures (last 7 days):           Last 24 Hours Medication List:   Current Facility-Administered Medications   Medication Dose Route Frequency Provider Last Rate   • acetaminophen  650 mg Oral Q6H PRN Pamela Ulloa MD     • albuterol  2 puff Inhalation Q4H PRN Pamela Ulloa MD     • ALPRAZolam  0 5 mg Oral HS PRN Pamela Ulloa MD     • aluminum-magnesium hydroxide-simethicone  30 mL Oral Q6H PRN Pamela Ulloa MD     • benzonatate  100 mg Oral TID PRN Pamela Ulloa MD     • carbamide peroxide  5 drop Both Ears BID Pamela Ulloa MD     • dabigatran etexilate  150 mg Oral Q12H [address] Pamela Ulloa MD     • dexamethasone  4 mg Oral Q12H [address] [facility] [place], [address]     • dextromethorphan-guaiFENesin  10 mL Oral Q4H PRN Pamela Ulloa MD     • memantine  10 mg Oral BID Pamela Ulloa MD     • metoprolol succinate  12 5 mg Oral Daily Pamela Ulloa MD     • ondansetron  4 mg Intravenous Q6H PRN Pamela Ulloa MD     • pantoprazole  40 mg Oral Early Morning Pamela Ulloa MD     • polyethylene glycol  17 g Oral Daily PRN Pamela Ulloa MD     • prazosin  1 mg Oral HS Pamela Ulloa MD     • sulfamethoxazole-trimethoprim  2 tablet Oral [facility] Smooth Gardner MD          Today, Patient Was Seen By: Leticia Thompson MD    ** Please Note: Dictation voice to text software may have been used in the creation of this document   **

## 2023-05-10 NOTE — ASSESSMENT & PLAN NOTE
Sodium levels downtrending  Hydrohlothiazide held     · Fluid restriction    Recent Labs     05/08/23  0427 05/09/23  0604 05/10/23  0458   SODIUM 126* 126* 125*     Results from last 7 days   Lab Units 05/09/23  1630   OSMO UR mmol/   SODIUM UR  86

## 2023-05-10 NOTE — PLAN OF CARE
Problem: INFECTION - ADULT  Goal: Absence or prevention of progression during hospitalization  Description: INTERVENTIONS:  - Assess and monitor for signs and symptoms of infection  - Monitor lab/diagnostic results  - Monitor all insertion sites, i e  indwelling lines, tubes, and drains  - Monitor endotracheal if appropriate and nasal secretions for changes in amount and color  - [place] appropriate cooling/warming therapies per order  - Administer medications as ordered  - Instruct and encourage patient and family to use good hand hygiene technique  - Identify and instruct in appropriate isolation precautions for identified infection/condition  Outcome: Progressing     Problem: RESPIRATORY - ADULT  Goal: Achieves optimal ventilation and oxygenation  Description: INTERVENTIONS:  - Assess for changes in respiratory status  - Assess for changes in mentation and behavior  - Position to facilitate oxygenation and minimize respiratory effort  - Oxygen administered by appropriate delivery if ordered  - Initiate smoking cessation education as indicated  - Encourage broncho-pulmonary hygiene including cough, deep breathe, Incentive Spirometry  - Assess the need for suctioning and aspirate as needed  - Assess and instruct to report SOB or any respiratory difficulty  - Respiratory Therapy support as indicated  Outcome: Progressing

## 2023-05-10 NOTE — TREATMENT PLAN
Infectious Diseases Chart Note:  - will remove airborne isolation; active pulmonary TB unlikely with three negative AFB smears from BAL samples and patient already found to have alternative diagnosis of pneumocystis pneumonia    Vanessa Vega MD

## 2023-05-10 NOTE — PROGRESS NOTES
Progress Note - Infectious Disease   Excell Dougie 70 y o  male MRN: [id number]  Unit/Bed#: E2 -01 Encounter: [id number]      Impression/Plan:    1  Pneumocystis pneumonia  CXR and CT chest show new bilateral GGO  Consider infectious vs inflammatory etiologies in this chronically immunosuppressed patient  PJP DFA from BAL positive  RP2 negative and COVID19 negative  Patient lived in [place] for 15 years, thus consider possibility of TB, though no other risk factors  Pulmonology following and he is s/p bronchoscopy for BAL 5/1/23 with multiple samples positive by DFA for pneumocystis, confirming diagnosis  Overall clinically improving and now on room air since starting treatment  - as patient now off oxygen, can decrease dose to PO Bactrim 2 DS tabs every 8 hours   - monitor CrCl and potassium  - plan for 21 day course total, through 5/24  - will also need 21 day course of adjunctive steroids; he is already on adequate coverage with his home chronic decadron  -following treatment, would benefit from secondary prophylaxis while on lifelong steroids   -will need outpatient ID follow up upon discharge; will notify office when closer to d/c  - monitor CBC + diff and BMP weekly while on therapy after DC    2  Active Hallucinations:  - Patient reports started about 3 weeks ago but are getting worse  He often sees his dog in the room and sometimes thinks he is at home  Suspect less likely from Bactrim given symptoms started before the antibiotic  Consider side effect of his steroids for which he was on at home as well  - primary team discussing with patient's Hematologist regarding possible decreasing steroid dose  - as above, will decrease Bactrim dose as well today  - monitor for improvement    3  Acute respiratory failure with hypoxia  This is presumed secondary to #1  Patient is now improving and weaned off oxygen since starting treatment as above  Aspergillus and Histoplasma antigens from BAL negative      - antibiotics and steroids for PJP as above  - follow up outstanding BAL labs:  - TB PCR  -close pulmonology follow up      4  ARABELLA: Estimated Creatinine Clearance: 46 3 mL/min (A) (by C-G formula based on SCr of 1 62 mg/dL (H))  - developed after starting Bactrim  Suspect this is largely medication side effect   - daily BMP for now  - dose of Bactrim reduced, as above     5  Hx of malignant melanoma, mets to brain, lung, liver, LN, bone  S/p whole brain radiation  Briefly started on treatment with encorafenib/binimetinib and was supposed to restart on 4/3 but now on hold due to recent acute illness  Follows with outpatient heme/onc, Dr Miranda    -close onc follow up      6  Chronic diarrhea  Known hx of C diff in the past  Recent admission for diverticulitis  CT A/P negative  C  Diff PCR positive with negative EIA  Likely colonized  -monitor closely for development of diarrhea     7  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, 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  Follows outpatient heme/onc  Previously treated with Rituxan and recently restarted on dexamethasone for #3  Follows Dr Adriana Casper  -monitor CBC  -as above, discussing steroids dose with Heme     8  Hx PE on Pradaxa  CTA chest negative for VTE  Plan and recommendations were discussed with primary team     Antibiotics:  TMP-SMX    Subjective:  Patient without fever, chills or SOB  Breathing feels well  However he is still having hallucinations  Patient had active one while I was in room where he thought he saw his dog  Objective:  Vitals:  Temp:  [97 7 °F (36 5 °C)-98 8 °F (37 1 °C)] 98 8 °F (37 1 °C)  HR:  [88-91] 88  Resp:  [19-20] 19  BP: (121-150)/(70-76) 121/74  SpO2:  [91 %-94 %] 92 %  Temp (24hrs), Av 3 °F (36 8 °C), Min:97 7 °F (36 5 °C), Max:98 8 °F (37 1 °C)  Current: Temperature: 98 8 °F (37 1 °C)    Physical Exam:   General Appearance:  Alert, interactive, nontoxic, no acute distress  Throat: Oropharynx moist without lesions  Lungs:    respirations unlabored   Heart:  RRR; no murmur   Abdomen:   Soft, non-tender      Extremities: No clubbing, cyanosis or edema   Skin: No new rashes or lesions  No draining wounds noted  Labs: All pertinent labs and imaging studies were personally reviewed  Results from last 7 days   Lab Units 05/10/23  0458 05/09/23  0604 05/08/23 0427   WBC Thousand/uL 18 10* 18 95* 16 64*   HEMOGLOBIN g/dL 11 0* 11 5* 10 6*   PLATELETS Thousands/uL 609* 547* 497*     Results from last 7 days   Lab Units 05/10/23  0458 05/09/23  0604 05/08/23  0427 05/05/23  0333 05/04/23  0612   SODIUM mmol/L 125* 126* 126*   < > 132*   POTASSIUM mmol/L 4 5 4 3 4 4   < > 3 3*   CHLORIDE mmol/L 94* 93* 93*   < > 97   CO2 mmol/L 21 22 22   < > 26   BUN mg/dL 32* 26* 24   < > 19   CREATININE mg/dL 1 62* 1 53* 1 49*   < > 1 13   EGFR ml/min/1 73sq m 42 45 47   < > 65   CALCIUM mg/dL 9 1 9 2 9 1   < > 8 5   AST U/L  --   --   --   --  20   ALT U/L  --   --   --   --  17   ALK PHOS U/L  --   --   --   --  51    < > = values in this interval not displayed       Results from last 7 days   Lab Units 05/04/23  0612   PROCALCITONIN ng/ml 0 57*         Results from last 7 days   Lab Units 05/06/23  0540   FERRITIN ng/mL 2,579*           Micro:        Imaging:          Jayson Avendano MD  [facility]

## 2023-05-10 NOTE — ASSESSMENT & PLAN NOTE
39-year-old male presenting with cough, chest tightness, shortness of breath, and generalized weakness likely secondary to Pneomocystis Carinii Pneumonia (Diagnosed through bronchoscopy, BAL positive for this)  • Improved   Currently on room air

## 2023-05-10 NOTE — TELEPHONE ENCOUNTER
"Returned call to spouse Jeremiah Leslie  States that Poncho Granado has been hospitalized for 10 days, required an increase in steroid dose  States that Poncho Granado is disoriented and hallucinating and very \"child like\"  Questions if altered mental status can be r/t recent radiation therapy  Advised that hallucinations are not acute side effect of radiation therapy, thought to be steroid related  Tonya Charlie states that they started a steroid taper today  Poncho Granado will be discharged to acute rehab for approximately 2 weeks      Jeremiah Leslie will notify office when discharged and/or mental status is not altered so that EOT phone call can be made by Dr Dumont     "

## 2023-05-10 NOTE — ASSESSMENT & PLAN NOTE
69-year-old male presenting with cough, chest tightness, shortness of breath, and generalized weakness likely secondary to Pneomocystis Carinii Pneumonia (Diagnosed through bronchoscopy, BAL positive for this)  • Improved   Currently on room air

## 2023-05-10 NOTE — PLAN OF CARE
Problem: Potential for Falls  Goal: Patient will remain free of falls  Description: INTERVENTIONS:  - Educate patient/family on patient safety including physical limitations  - Instruct patient to call for assistance with activity   - Consult OT/PT to assist with strengthening/mobility   - Keep Call bell within reach  - Keep bed low and locked with side rails adjusted as appropriate  - Keep care items and personal belongings within reach  - Initiate and maintain comfort rounds  - Make Fall Risk Sign visible to staff  - Offer Toileting every 2 Hours, in advance of need  - Initiate/Maintain bed alarm  - Obtain necessary fall risk management equipment: gripper socks  - Apply yellow socks and bracelet for high fall risk patients  - Consider moving patient to room near nurses station  Outcome: Progressing     Problem: MOBILITY - ADULT  Goal: Maintain or return to baseline ADL function  Description: INTERVENTIONS:  -  Assess patient's ability to carry out ADLs; assess patient's baseline for ADL function and identify physical deficits which impact ability to perform ADLs (bathing, care of mouth/teeth, toileting, grooming, dressing, etc )  - Assess/evaluate cause of self-care deficits   - Assess range of motion  - Assess patient's mobility; develop plan if impaired  - Assess patient's need for assistive devices and provide as appropriate  - Encourage maximum independence but intervene and supervise when necessary  - Involve family in performance of ADLs  - Assess for home care needs following discharge   - Consider OT consult to assist with ADL evaluation and planning for discharge  - Provide patient education as appropriate  Outcome: Progressing     Problem: INFECTION - ADULT  Goal: Absence or prevention of progression during hospitalization  Description: INTERVENTIONS:  - Assess and monitor for signs and symptoms of infection  - Monitor lab/diagnostic results  - Monitor all insertion sites, i e  indwelling lines, tubes, and drains  - Monitor endotracheal if appropriate and nasal secretions for changes in amount and color  - [place] appropriate cooling/warming therapies per order  - Administer medications as ordered  - Instruct and encourage patient and family to use good hand hygiene technique  - Identify and instruct in appropriate isolation precautions for identified infection/condition  Outcome: Progressing    Goal: Maintain or return to baseline ADL function  Description: INTERVENTIONS:  -  Assess patient's ability to carry out ADLs; assess patient's baseline for ADL function and identify physical deficits which impact ability to perform ADLs (bathing, care of mouth/teeth, toileting, grooming, dressing, etc )  - Assess/evaluate cause of self-care deficits   - Assess range of motion  - Assess patient's mobility; develop plan if impaired  - Assess patient's need for assistive devices and provide as appropriate  - Encourage maximum independence but intervene and supervise when necessary  - Involve family in performance of ADLs  - Assess for home care needs following discharge   - Consider OT consult to assist with ADL evaluation and planning for discharge  - Provide patient education as appropriate  Outcome: Progressing     Problem: RESPIRATORY - ADULT  Goal: Achieves optimal ventilation and oxygenation  Description: INTERVENTIONS:  - Assess for changes in respiratory status  - Assess for changes in mentation and behavior  - Position to facilitate oxygenation and minimize respiratory effort  - Oxygen administered by appropriate delivery if ordered  - Initiate smoking cessation education as indicated  - Encourage broncho-pulmonary hygiene including cough, deep breathe, Incentive Spirometry  - Assess the need for suctioning and aspirate as needed  - Assess and instruct to report SOB or any respiratory difficulty  - Respiratory Therapy support as indicated  Outcome: Progressing     Problem: GASTROINTESTINAL - ADULT  Goal: Minimal or absence of nausea and/or vomiting  Description: INTERVENTIONS:  - Administer IV fluids if ordered to ensure adequate hydration  - Maintain NPO status until nausea and vomiting are resolved  - Nasogastric tube if ordered  - Administer ordered antiemetic medications as needed  - Provide nonpharmacologic comfort measures as appropriate  - Advance diet as tolerated, if ordered  - Consider nutrition services referral to assist patient with adequate nutrition and appropriate food choices  Outcome: Progressing  Goal: Maintains or returns to baseline bowel function  Description: INTERVENTIONS:  - Assess bowel function  - Encourage oral fluids to ensure adequate hydration  - Administer IV fluids if ordered to ensure adequate hydration  - Administer ordered medications as needed  - Encourage mobilization and activity  - Consider nutritional services referral to assist patient with adequate nutrition and appropriate food choices  Outcome: Progressing     Problem: METABOLIC, FLUID AND ELECTROLYTES - ADULT  Goal: Electrolytes maintained within normal limits  Description: INTERVENTIONS:  - Monitor labs and assess patient for signs and symptoms of electrolyte imbalances  - Administer electrolyte replacement as ordered  - Monitor response to electrolyte replacements, including repeat lab results as appropriate  - Instruct patient on fluid and nutrition as appropriate  Outcome: Progressing

## 2023-05-10 NOTE — TELEPHONE ENCOUNTER
Patient Call    Who are you speaking with? Wife   If it is not the patient, are they listed on an active communication consent form? YES   What is the reason for this call? DEXAMETHASONE having many side effects, hallucinating, confused, childlike   Does this require a call back? yes   If a call back is required, please list best call back number [phone]   If a call back is required, advise that a message will be forwarded to their care team and someone will return their call as soon as possible  Did you relay this information to the patient?  yes

## 2023-05-11 NOTE — OCCUPATIONAL THERAPY NOTE
Occupational Therapy Progress Note     Patient Name: Sofia Corea  FCZVS'D Date: 5/11/2023  Problem List  Principal Problem:    Acute respiratory failure with hypoxia [facility])  Active Problems:    History of pulmonary embolism    Autoimmune hemolytic anemia    Acute kidney injury ([facility] [address] )    Metastatic melanoma ([facility] )    History of Clostridium difficile infection    Pneumocystis carinii pneumonia ([facility] )    Hyponatremia    Hallucination          05/11/23 1201   OT Last Visit   OT Visit Date 05/11/23   Note Type   Note Type Treatment for insurance authorization   Pain Assessment   Pain Assessment Tool 0-10   Pain Score No Pain   Restrictions/Precautions   Weight Bearing Precautions Per Order No   Other Precautions Contact/isolation; Bed Alarm; Chair Alarm;Cognitive;Multiple lines; Fall Risk   ADL   Where Assessed Chair   LB Dressing Assistance 3  Moderate Assistance   LB Dressing Deficit Steadying;Setup; Requires assistive device for steadying;Verbal cueing;Supervision/safety; Increased time to complete; Thread LLE into pants; Thread RLE into pants;Pull up over hips   LB Dressing Comments assist to thread LEs through pants, mod assist and able to assist w/ one UE support on RW and other hand to pull up pants on R side   Functional Standing Tolerance   Time 3 minutes   Activity w/ MOD assist steadying support w/ RW, posterior lean at times   Bed Mobility   Supine to Sit 3  Moderate assistance   Additional items Assist x 2; Increased time required;LE management;Verbal cues; Bedrails;HOB elevated   Additional Comments increased time to complete, posterior lean at times and min-mod support then improved to close supervision   Transfers   Sit to Stand 3  Moderate assistance   Additional items Assist x 2;Verbal cues; Increased time required;Armrests   Stand to Sit 3  Moderate assistance   Additional items Assist x 2; Increased time required;Verbal cues;Armrests   Additional Comments cues for hand placement and positioning   Therapeutic Exercise - ROM   UE-ROM Yes   ROM- Right Upper Extremities   R Elbow AROM;Elbow flexion;Elbow extension   R Weight/Reps/Sets 10reps   ROM - Left Upper Extremities    L Elbow AROM;Elbow extension;Elbow flexion   L Weight/Reps/Sets 10 reps   Cognition   Overall Cognitive Status Impaired   Arousal/Participation Alert; Cooperative;Responsive   Attention Attends with cues to redirect   Orientation Level Oriented to person;Oriented to place;Oriented to time  (not specific date)   Memory Decreased recall of precautions;Decreased recall of recent events;Decreased short term memory   Following Commands Follows one step commands with increased time or repetition   Comments increased processign time, pleasant and cooperative, motivated to get better   Additional Activities   Additional Activities   (pursed lip breathing)   Additional Activities Comments pt receptive   Activity Tolerance   Activity Tolerance Patient limited by fatigue;Treatment limited secondary to medical complications (Comment)   Medical Staff Made Aware appropriate to see per RN and PTA Minh beltrán assist x2 and medical complexity   Assessment   Assessment Pt seen for skilled OT session focused on ADLs, functional transfers and mobility  Pt w/ improvement in alertness this session and motivated to get better and back to routine  Pt w/ MOD assist x2 supine>sit bed mobility and initially w/ posterior lean requiring support and then improved  Pt w/ MOD assist LB Dressing 2* impaired balance and functional reach (see above for further assist)  Pt w/ MOD assist x2 sit>stand w/ VCs for hand placement and positioning  Pt w/ MOD assist x2 functional mobility w/ RW and posterior lean noted and cues for sequencing  Pt w/ MIN assist x 2 stand>sit w/ VCs for hand placement   Pt continues to bed limited due to impaired balance, impaired activity tolerance, dyspnea on exertion (SPO2 on room air 89-96% w/ cues for pursed lip breathing and frequent rest breaks needed), Tremors of b/l hands, impaired functional reach, decreased endurance, decreased activity tolerance, fall risk, decreased strength all causing a decline in ADLs, functional transfers and mobility  Recommend STR when medically stable  Will continue to follow  The patient's raw score on the AM-PAC Daily Activity Inpatient Short Form is 15  A raw score of less than 19 suggests the patient may benefit from discharge to post-acute rehabilitation services  Please refer to the recommendation of the Occupational Therapist for safe discharge planning  Plan   Treatment Interventions ADL retraining;Functional transfer training; Endurance training;UE strengthening/ROM; Cognitive reorientation;Equipment evaluation/education;Patient/family training; Compensatory technique education; Activityengagement; Energy conservation   Goal Expiration Date 05/17/23   OT Treatment Day 2   OT Frequency 3-5x/wk   Recommendation   OT Discharge Recommendation Post acute rehabilitation services   [facility] Daily Activity Inpatient   Lower Body Dressing 2   Bathing 2   Toileting 2   Upper Body Dressing 3   Grooming 3   Eating 3   Daily Activity Raw Score 15   Daily Activity Standardized Score (Calc for Raw Score >=11) 34 69   AM-PAC Applied Cognition Inpatient   Following a Speech/Presentation 3   Understanding Ordinary Conversation 4   Taking Medications 2   Remembering Where Things Are Placed or Put Away 2   Remembering List of 4-5 Errands 2   Taking Care of Complicated Tasks 2   Applied Cognition Raw Score 15   Applied Cognition Standardized Score 33 54   Modified Randal Scale   Modified Stone Scale 4   End of Consult   Patient Position at End of Consult Bedside chair;Bed/Chair alarm activated; All needs within reach  (PT working w/ pt end of session)     Documentation completed by: Yuliana Varma MS, OTR/L

## 2023-05-11 NOTE — CONSULTS
Consultation - Nephrology   Bernard Solano 70 y o  male MRN: [id number]  Unit/Bed#: [place] 2 -01 Encounter: [id number]    ASSESSMENT/PLAN:  Acute kidney injury: Suspect due to thiazide and Bactrim   -Baseline creatinine: 1 1-1 2 since 2021   -Presented with creatinine of 1 25   -Creatinine is currently: 1 71   -S/P IV contrast 04/30   -Bactrim dose decreased 5/10/2023  Recommend renal dosing  Primary team to discuss with ID in regards to continuing treatment with Bactrim vs changing antibiotic  -repeat UA   -Renal US is pending  -CT showed unremarkable kidneys   -Holding hydrochlorothiazide   -Continue to avoid nephrotoxins, hypotension, IV contrast   -I/O  Elevated anion gap: Suspected starvation ketosis  -Started on D5W with normal saline   -repeating UA  Hyponatremia: Suspect SIADH in the setting of pneumonia as well as history of malignant melanoma with mets to brain, lung, liver, LN, bone   -Baseline sodium level within normal range   -Scented with sodium of 135   -Sodium is currently 129   -Monitoring on fluid restriction   -Work-up is currently pending  Urine Na 65, urine osm 741   -Placed on D5 normal saline   -Recommend correcting 6 to 8 mEq in 24 hours  -repeat BMP in am     Hypertension: Blood pressure is stable  -Currently on metoprolol 12 5 mg daily and prazosin 1 mg at bedtime   -Home medications: Hydrochlorothiazide 25 mg daily, prazosin 1 mg at bedtime, metoprolol 12 5 mg daily   -Holding parameters adjusted for systolic blood pressure less than 130   -Recommend avoiding hypotension or high fluctuations in blood pressure  Acute respiratory failure with hypoxia: Presents with shortness of breath, suspect secondary to Pneumocystis carinii pneumonia  Currently on room air  Pneumocystis carinii pneumonia: With history of metastatic malignancy treated brain radiation   -Antibiotic management per infectious disease, currently on Bactrim    Will need 21-day course through 5/24/2023   -Also on corticosteroids  Autoimmune hemolytic anemia: Treated with Rituxan in August 2022   -On chronic steroids with dexamethasone  Currently with hallucinations, hematology/oncology recommending tapering down   -iron panel with normal iron and iron sat  History of C  difficile infection: Currently on vancomycin p o  for prophylaxis  History of malignant melanoma: Mets to brain, lung, liver, L at, and bone  Received whole brain radiation  Following outpatient with hematology/oncology  Other:  pulmonary embolism on Pradaxa      HISTORY OF PRESENT ILLNESS:  Requesting Physician: Laurie Trujillo MD  Reason for Consult: ARABELLA, hyponatremia    Maddy Leavitt is a 70y o  year old male with history of metastatic melanoma to brain treated with radiation, autoimmune hemolytic anemia, pulmonary embolism, who was admitted to [facility] after presenting with reports of dizziness x2 weeks as well as shortness of breath  The patient denies fevers or chills  He denies chest discomfort  He denies nausea, vomiting, diarrhea, or changes with his urination  He reports having normal appetite  He denies any NSAID use  He denies any previous renal history or history of hyponatremia  Per record review, patient had admission in mid April for diverticulitis with repeat admission on 4/22 for diarrhea with acute kidney injury  A renal consultation is requested today for assistance in the management of acute kidney injury and hyponatremia      PAST MEDICAL HISTORY:  Past Medical History:   Diagnosis Date   • Acute diverticulitis 4/12/2023   • ARABELLA (acute kidney injury) ([facility] [address] ) 3/29/2018   • Anemia 11/02/2016   • Anxiety    • Arthritis    • Autoimmune hemolytic anemia ([address] )    • Claustrophobia    • COVID 12/2020   • DVT (deep venous thrombosis) ([facility])    • GERD (gastroesophageal reflux disease)    • Hearing loss, right    • Hemolytic anemia (HCC)    • History of transfusion     2018 - no adverse reaction   • Hypertension • Malignant melanoma of leg, right ([address] ) 07/01/2022   • Palpitation    • Portal vein thrombosis    • PTSD (post-traumatic stress disorder)    • Pulmonary emboli (HCC)    • Squamous cell skin cancer 12/20/2022    SCCIS- 12/6/22   • Tobacco abuse        PAST SURGICAL HISTORY:  Past Surgical History:   Procedure Laterality Date   • CATARACT EXTRACTION Bilateral    • CHOLECYSTECTOMY  07/18/2017   • COLONOSCOPY     • ELBOW ARTHROPLASTY Left     bursectomy   • IR BIOPSY RETROPERITONEUM  3/17/2023   • IR DRAINAGE TUBE CHECK WITH SCLEROSIS  10/06/2022   • IR DRAINAGE TUBE CHECK WITH SCLEROSIS  10/10/2022   • IR DRAINAGE TUBE CHECK WITH SCLEROSIS  10/20/2022   • IR DRAINAGE TUBE CHECK WITH SCLEROSIS  10/27/2022   • IR DRAINAGE TUBE CHECK WITH SCLEROSIS  11/04/2022   • IR DRAINAGE TUBE CHECK WITH SCLEROSIS  11/15/2022   • IR DRAINAGE TUBE CHECK WITH SCLEROSIS  11/25/2022   • IR DRAINAGE TUBE PLACEMENT  09/19/2022   • JOINT REPLACEMENT Right 02/02/2021    knee   • KNEE SURGERY Right     meniscus tear   • LYMPH NODE BIOPSY Right 07/29/2022    Procedure: BIOPSY LYMPH NODE SENTINEL;  Surgeon: Arya Reyes MD;  [facility]: [facility];  Service: Surgical Oncology   • MOHS SURGERY Right 12/20/2022    Right dorsal hand SCCIS-Dr Isabel   • WV ARTHRP KNE CONDYLE&PLATU MEDIAL&LAT COMPARTMENTS Right 02/02/2021    Procedure: ARTHROPLASTY KNEE TOTAL;  Surgeon: Kym Siddiqui MD;  [facility];  Service: Orthopedics   • [place] ARTHRP KNE CONDYLE&PLATU MEDIAL&LAT COMPARTMENTS Left 11/17/2021    Procedure: TOTAL KNEE REPLACEMENT;  Surgeon: Kym Siddiqui MD;  [facility];  Service: Orthopedics   • WV LAPS SURG CHOLECYSTECTOMY Jud Giordano N/A 12/23/2017    Procedure: CHOLECYSTECTOMY LAPAROSCOPIC with cholangiogram;  Surgeon: Rita Nielson MD;  [facility];  Service: General   • [place] SPLENECTOMY TOTAL SEPARATE PROCEDURE N/A 05/18/2017    Procedure: LAPAROSCOPIC HAND ASSIST SPLENECTOMY;  Surgeon: Kelly Rios MD;  [facility];  Service: Surgical Oncology   • SHOULDER SURGERY Left     rotator cuff x4, reconstruction   • SKIN BIOPSY  5 May 2022   • SKIN CANCER EXCISION  29 July 2022   • SKIN LESION EXCISION Right 07/29/2022    Procedure: WIDE EXCISION RIGHT MEDIAL THIGH;  Surgeon: Socorro Isaac MD;  Location: [facility];  Service: Surgical Oncology       ALLERGIES:  No Known Allergies    SOCIAL HISTORY:  Social History     Substance and Sexual Activity   Alcohol Use Yes   • Alcohol/week: 6 0 standard drinks   • Types: 6 Cans of beer per week    Comment: socially     Social History     Substance and Sexual Activity   Drug Use Yes   • Types: Marijuana    Comment: medical marijuana     Social History     Tobacco Use   Smoking Status Some Days   • Packs/day: 0 00   • Years: 5 00   • Pack years: 0 00   • Types: Cigars, Cigarettes   Smokeless Tobacco Current   • Types: Snuff   Tobacco Comments    5  a week average       FAMILY HISTORY:  Family History   Problem Relation Age of Onset   • Cancer Mother    • Breast cancer Mother    • Other Father         CABG   • Heart attack Paternal Grandfather         acute MI       MEDICATIONS:    Current Facility-Administered Medications:   •  acetaminophen (TYLENOL) tablet 650 mg, 650 mg, Oral, Q6H PRN, Alexsander Quezada MD  •  albuterol (PROVENTIL HFA,VENTOLIN HFA) inhaler 2 puff, 2 puff, Inhalation, Q4H PRN, Alexsander Quezada MD, 2 puff at 05/05/23 1346  •  ALPRAZolam (XANAX) tablet 0 5 mg, 0 5 mg, Oral, HS PRN, Alexsander Quezada MD  •  aluminum-magnesium hydroxide-simethicone (MYLANTA) oral suspension 30 mL, 30 mL, Oral, Q6H PRN, Alexsander Quezada MD  •  benzonatate (TESSALON PERLES) capsule 100 mg, 100 mg, Oral, TID PRN, Virl Dimitris Paz MD, 100 mg at 04/30/23 2327  •  carbamide peroxide (DEBROX) 6 5 % otic solution 5 drop, 5 drop, Both Ears, BID, Alexsander Quezada MD, 5 drop at 05/11/23 0999  •  dabigatran etexilate (PRADAXA) capsule 150 mg, 150 mg, Oral, Q12H [facility], Nakul Calderon MD, 150 mg at 05/11/23 0935  •  dexamethasone (DECADRON) tablet 4 mg, 4 mg, Oral, Q12H [facility], VISHNU Leal, 4 mg at 05/11/23 0935  •  dextromethorphan-guaiFENesin (ROBITUSSIN DM) oral syrup 10 mL, 10 mL, Oral, Q4H PRN, Laurel Paz MD, 10 mL at 05/01/23 1405  •  dextrose 5 % and sodium chloride 0 9 % infusion, 50 mL/hr, Intravenous, Continuous, Art Waddell DO  •  memantine [facility]) tablet 10 mg, 10 mg, Oral, BID, Nakul Calderon MD, 10 mg at 05/11/23 1972  •  metoprolol succinate (TOPROL-XL) 24 hr tablet 12 5 mg, 12 5 mg, Oral, Daily, Laurel Paz MD, 12 5 mg at 05/11/23 0936  •  ondansetron (ZOFRAN) injection 4 mg, 4 mg, Intravenous, Q6H PRN, Nakul Calderon MD  •  pantoprazole (PROTONIX) EC tablet 40 mg, 40 mg, Oral, Early Morning, Nakul Calderon MD, 40 mg at 05/11/23 0511  •  polyethylene glycol (MIRALAX) packet 17 g, 17 g, Oral, Daily PRN, Nakul Calderon MD  •  prazosin (MINIPRESS) capsule 1 mg, 1 mg, Oral, HS, VISHNU Brandt  •  sulfamethoxazole-trimethoprim (BACTRIM DS) 800-160 mg per tablet 2 tablet, 2 tablet, Oral, Q8H [facility], Akshat Villatoro MD, 2 tablet at 05/11/23 0511    REVIEW OF SYSTEMS:  A complete review of systems was performed and found to be negative unless otherwise noted in the history of present illness  General: No fevers, chills  Cardiovascular:  - chest pain, - leg edema  Respiratory: No cough, sputum production,  + shortness of breath  Gastrointestinal:  - nausea/vomiting,  - diarrhea,  - abdominal pain  Genitourinary: No hematuria  No foamy urine    No dysuria    PHYSICAL EXAM:  Current Weight: Weight - Scale: 87 6 kg (193 lb 2 oz)  First Weight: Weight - Scale: 91 7 kg (202 lb 2 6 oz)  Vitals:    05/10/23 1533 05/10/23 2131 05/11/23 0423 05/11/23 0731   BP: 131/77 135/77 136/79 117/74   BP Location:  Left arm     Pulse: 78 96 88 80 Resp: 16 20  16   Temp: 97 9 °F (36 6 °C) 98 °F (36 7 °C) 97 7 °F (36 5 °C) 98 2 °F (36 8 °C)   TempSrc:  Oral     SpO2: 94% 93% 92% 93%   Weight:       Height:         No intake or output data in the 24 hours ending 05/11/23 1005  General: NAD  Skin: warm, dry, intact, no rash  HEENT: Moist mucous membranes, sclera anicteric, normocephalic, atraumatic  Neck: No apparent JVD appreciated  Chest:lung sounds clear B/L, on RA   CVS:Regular rate and rhythm, no murmer   Abdomen: Soft, round, non-tender, +BS  Extremities: No B/L LE edema present  Neuro: alert and oriented  Psych: appropriate mood and affect     Invasive Devices:      Lab Results:   Results from last 7 days   Lab Units 05/11/23  0438 05/10/23  0458 05/09/23  0604   WBC Thousand/uL 18 11* 18 10* 18 95*   HEMOGLOBIN g/dL 12 6 11 0* 11 5*   HEMATOCRIT % 38 0 32 0* 33 6*   PLATELETS Thousands/uL 688* 609* 547*   SODIUM mmol/L 129* 125* 126*   POTASSIUM mmol/L 4 3 4 5 4 3   CHLORIDE mmol/L 94* 94* 93*   CO2 mmol/L 21 21 22   BUN mg/dL 36* 32* 26*   CREATININE mg/dL 1 71* 1 62* 1 53*   CALCIUM mg/dL 9 7 9 1 9 2   MAGNESIUM mg/dL 2 2 1 9 1 9

## 2023-05-11 NOTE — CASE MANAGEMENT
Case Management Discharge Planning Note    Patient name Caryle Balo  Location [place] [facility] [address] [place]* MRN [id number]  : 1955 Date 2023       Current Admission Date: 2023  Current Admission Diagnosis:Acute respiratory failure with hypoxia [facility])   Patient Active Problem List    Diagnosis Date Noted   • Hallucination 05/10/2023   • Hyponatremia 2023   • Pneumocystis carinii pneumonia ([facility] [address] [address] ) 2023   • Ground glass opacity present on imaging of lung 2023   • Generalized weakness 2023   • History of Clostridium difficile infection 2023   • Melanoma ([facility] [address] ) 2023   • Encounter for follow-up surveillance of melanoma 2023   • Lymphocele after surgical procedure 10/06/2022   • Elevated serum creatinine 2022   • Elevated bilirubin 2022   • Leukocytosis 2022   • Dyspnea 2022   • Acute respiratory failure with hypoxia ([facility] [address] [address] ) 2022   • Metastatic melanoma ([facility] ) 2022   • Hypercholesterolemia 2021   • Chronic bilateral low back pain with bilateral sciatica 10/08/2021   • Hyperglycemia 2021   • Primary localized osteoarthrosis of the knee, right 2021   • Thrombocytosis 2021   • COVID-19 2020   • Pain in joint, lower leg 11/10/2020   • Primary osteoarthritis of left knee 2020   • Pain in left knee 2020   • Auto immune neutropenia (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   • Glucose intolerance (impaired glucose tolerance) 2020   • Renal insufficiency 2020   • Essential hypertension 2019   • Posttraumatic stress disorder 10/28/2019   • Iron overload due to repeated red blood cell transfusions 2018   • Sepsis ([address] ) 2018   • Acute kidney injury ([facility] ) 2018   • Autoimmune hemolytic anemia    • Shortness of breath 2017   • Gastroesophageal reflux disease 2017   • Elevated blood pressure reading without diagnosis of hypertension 2017   • Portal vein thrombosis 2017 • History of pulmonary embolism 06/06/2017   • Splenomegaly 05/02/2017   • Symptomatic anemia 02/03/2017   • 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   • Hyperbilirubinemia 11/02/2016      LOS (days): 11  Geometric Mean LOS (GMLOS) (days): 5 20  Days to GMLOS:-5 4     OBJECTIVE:  Risk of Unplanned Readmission Score: 29 23         Current admission status: Inpatient   [facility] Pharmacy:   [id number] [address] [facility] [address]  [address] [place] [address]  Phone: [phone] Fax: [phone]    Primary Care Provider: Jessenia Cabrera DO    Primary Insurance: MEDICARE  [facility]    DISCHARGE DETAILS:    Discharge planning discussed with[de-identified] Serge Angeles wife  Freedom of Choice: Yes  Comments - Freedom of Choice: Agrees to STR hoping for [facility]- at the moment is declinuing the [facility] facilities nor a physical SNF- not sure what she would do should he not get in but would lean on bringing him home  CM contacted family/caregiver?: Yes  Were Treatment Team discharge recommendations reviewed with patient/caregiver?: Yes  Did patient/caregiver verbalize understanding of patient care needs?: Yes  Were patient/caregiver advised of the risks associated with not following Treatment Team discharge recommendations?: Yes    Contacts  Patient Contacts: Serge Angeles  Relationship to Patient[de-identified] Family  Contact Method: Phone  Phone Number: [phone]  Reason/Outcome: Discharge Planning, Referral                        Treatment Team Recommendation: Short Term Rehab     Transport at Discharge : BLS Ambulance (nonambulatory)

## 2023-05-11 NOTE — PLAN OF CARE
Problem: PHYSICAL THERAPY ADULT  Goal: Performs mobility at highest level of function for planned discharge setting  See evaluation for individualized goals  Description: Treatment/Interventions: Functional transfer training, LE strengthening/ROM, Elevations, Therapeutic exercise, Endurance training, Patient/family training, Equipment eval/education, Bed mobility, Gait training, Compensatory technique education, Continued evaluation, Spoke to nursing, OT  Equipment Recommended: Other (Comment) (monitor needs with progress )       See flowsheet documentation for full assessment, interventions and recommendations  Outcome: Progressing  Note: Prognosis: Fair  Problem List: Decreased strength, Decreased endurance, Impaired balance, Decreased mobility, Decreased cognition, Decreased coordination, Impaired judgement, Decreased safety awareness  Assessment: Pt seen for PT treatment session this date with interventions consisting of bed mobility, transfer training, gait training and HEP, and education provided as needed for safety and direction to improve functional mobility, safety awareness, and activity tolerance  Pt agreeable to PT treatment session upon arrival, pt found supine in bed offering no c/o pain  At end of session, pt left seated out of bed in chair with all needs in reach eating lunch  In comparison to previous session, pt with improvement in activity tolerance, endurance, ambulation distances and AM- pac score  Pt continues to disply heavy retropulsion /posterior lean upon sitting eob requiring max to mod to min to close supervison assist for trunk support and cues for correction  Improved balance and upright posture as sitting time progressed at EOB  Pt  Displays retropulsion upon stading requiring max to mod assist x2  And verbal cues to correct  Progressing with ambulation distances to 3' and 6' with mod assist x2 gait deviations as noted in flowsheet    Pt limited by fatigue, LOWE s drops in SPO2 to 85%-92% hr 119 bpm at rest 95% hr 78 bpm  Pt  Performs seated b/l cookie arom, isometric and manual resistee exercises to b/l le'ss in seated position  Pt tolerated well rest breaks PRN for fatigue required  Pt  Was ablet o remain out of bed in cahir post PT session  Please refer to endurance deficit section of flowsheet for vitals  Continue to recommend IP rehab at time of d/c in order to maximize pt's functional independence and safety w/ mobility  Pt continues to be functioning below baseline level  PT will continue to see pt while here in order to address the deficits listed above and provide interventions consistent w/ POC in effort to achieve STGs  The patient's AM-PAC Basic Mobility Inpatient Short Form Raw Score is 8  A raw score less than 16 suggests the patient may benefit from discharge to post-acute rehabilitation services  Please also refer to the recommendation of the Physical Therapist for safe discharge planning  Barriers to Discharge: None  Barriers to Discharge Comments: stairs  PT Discharge Recommendation: Post acute rehabilitation services    See flowsheet documentation for full assessment

## 2023-05-11 NOTE — ASSESSMENT & PLAN NOTE
History of warm antibody hemolytic anemia  Previously treated with Rituxan (8/2022 last received)  • Continue chronic steroid treatment with Dexamethasone  • Spoke to hematology-oncology (Reynaldo Valadez), May taper Dexamethasone from 4mg Q8 hours to Q12 hours due to side effects (Hallucinations)  (Dexamethasone is for the mets to his brain, previously on prednisone for the hemolytic anemia)  • Appreciate hematology-oncology recommendations

## 2023-05-11 NOTE — PHYSICAL THERAPY NOTE
PHYSICAL THERAPY NOTE          Patient Name: Eric WILLIS Date: 5/11/2023 05/11/23 1223   Note Type   Note Type Treatment for insurance authorization   Pain Assessment   Pain Assessment Tool 0-10   Pain Score No Pain   Restrictions/Precautions   Other Precautions Contact/isolation;Cognitive; Chair Alarm; Bed Alarm;Multiple lines; Fall Risk   General   Chart Reviewed Yes   Family/Caregiver Present No   Cognition   Overall Cognitive Status Impaired   Arousal/Participation Alert; Cooperative   Attention Attends with cues to redirect   Orientation Level Oriented to person;Oriented to place;Oriented to time  (NOT specific date)   Memory Decreased recall of precautions;Decreased recall of recent events;Decreased short term memory   Following Commands Follows one step commands with increased time or repetition   Subjective   Subjective I am so shaky and my motor skills are off  I think I had a stroke  Bed Mobility   Supine to Sit 3  Moderate assistance   Additional items Assist x 2;HOB elevated; Bedrails; Increased time required;Verbal cues;LE management   Additional Comments increased time to perform and complete  heavy to moderate retropulsion upon sitting eob requiring max - mod assist x1 with cues and assistance for correction of sitting balance and posture  improved to min-close supervision  eob x 5-7 minutes  Transfers   Sit to Stand 3  Moderate assistance   Additional items Assist x 2;Armrests; Bedrails; Increased time required;Verbal cues   Stand to Sit 2  Maximal assistance   Additional items Assist x 2;Armrests; Increased time required;Verbal cues   Additional Comments cues for hand placement   Ambulation/Elevation   Gait pattern Improper Weight shift;Decreased foot clearance; Short stride; Step to;Excessively slow;Retropulsion   Gait Assistance 3  Moderate assist   Additional items Assist x 2;Verbal cues   Assistive Device Rolling walker   Distance 3' x1, 6' x1   Balance   Static Sitting   (poor (-) to fair severe to moderate retropulsion/ posterior lean with max to mod assist x1 for trunk support progresing to min to close supervision x1)   Dynamic Sitting   (poor(-) - fair)   Static Standing   (poor (-) to poor(+) retropulsion upon standing requiring mod assist x2 to correct with verbal cues with support of Rw )   Dynamic Standing Poor -   Ambulatory Poor -   Endurance Deficit   Endurance Deficit Description spo2 on exertion on Ra 85%-92% hr 119bpm,  at rest on RA 95% HR 78 BPM   Activity Tolerance   Activity Tolerance Patient limited by fatigue   Medical Staff Made Aware Rosa CULLEN   Exercises   Hip Abduction Sitting;Bilateral  (manural resisted x 12 reps)   Hip Adduction Sitting;Bilateral  (isomteric hip add x 12 reps)   Knee AROM Long Arc [employer]; Bilateral  (x 12 reps )   Ankle Pumps Sitting;AROM; Bilateral  (x 12 reps hell/ toe raises in sittig )   Marching Sitting;AROM; Bilateral  (x 12 reps )   Balance training  sitting and standing balance activites performed with max - mod assist x1 to maintain static sitting EOB progressing to min- close supervision x1, standing with max assist x2 due to retropulsion progressing to mod assist x2 and cues for upright posture and le positinoing and placement of le's under base of support  Assessment   Prognosis Fair   Problem List Decreased strength;Decreased endurance; Impaired balance;Decreased mobility; Decreased cognition;Decreased coordination; Impaired judgement;Decreased safety awareness   Assessment Pt seen for PT treatment session this date with interventions consisting of bed mobility, transfer training, gait training and HEP, and education provided as needed for safety and direction to improve functional mobility, safety awareness, and activity tolerance  Pt agreeable to PT treatment session upon arrival, pt found supine in bed offering no c/o pain    At end of session, pt left seated out of bed in chair with all needs in reach eating lunch  In comparison to previous session, pt with improvement in activity tolerance, endurance, ambulation distances and AM- pac score  Pt continues to disply heavy retropulsion /posterior lean upon sitting eob requiring max to mod to min to close supervison assist for trunk support and cues for correction  Improved balance and upright posture as sitting time progressed at EOB  Pt  Displays retropulsion upon stading requiring max to mod assist x2  And verbal cues to correct  Progressing with ambulation distances to 3' and 6' with mod assist x2 gait deviations as noted in flowsheet  Pt limited by fatigue, LOWE s drops in SPO2 to 85%-92% hr 119 bpm at rest 95% hr 78 bpm  Pt  Performs seated b/l cookie arom, isometric and manual resistee exercises to b/l le'ss in seated position  Pt tolerated well rest breaks PRN for fatigue required  Pt  Was ablet o remain out of bed in cahir post PT session  Please refer to endurance deficit section of flowsheet for vitals  Continue to recommend IP rehab at time of d/c in order to maximize pt's functional independence and safety w/ mobility  Pt continues to be functioning below baseline level  PT will continue to see pt while here in order to address the deficits listed above and provide interventions consistent w/ POC in effort to achieve STGs  The patient's AM-PAC Basic Mobility Inpatient Short Form Raw Score is 8  A raw score less than 16 suggests the patient may benefit from discharge to post-acute rehabilitation services  Please also refer to the recommendation of the Physical Therapist for safe discharge planning  Goals   Patient Goals To get back to somewhat normal life  STG Expiration Date 05/13/23   PT Treatment Day 3   Plan   Treatment/Interventions Functional transfer training;LE strengthening/ROM; Therapeutic exercise; Endurance training;Cognitive reorientation;Patient/family training;Equipment eval/education; Bed mobility;Gait training;OT   Progress Slow progress, decreased activity tolerance   PT Frequency 3-5x/wk   Recommendation   PT Discharge Recommendation Post acute rehabilitation services   AM-PAC Basic Mobility Inpatient   Turning in Flat Bed Without Bedrails 2   Lying on Back to Sitting on Edge of Flat Bed Without Bedrails 1   Moving Bed to Chair 1   Standing Up From Chair Using Arms 2   Walk in Room 1   Climb 3-5 Stairs With Railing 1   Basic Mobility Inpatient Raw Score 8   Turning Head Towards Sound 4   Follow Simple Instructions 4   Low Function Basic Mobility Raw Score  16   Low Function Basic Mobility Standardized Score  25 72   Highest Level Of Mobility   JH-HLM Goal 3: Sit at edge of bed   JH-HLM Achieved 5: Stand (1 or more minutes)   Education   Education Provided Mobility training;Home exercise program;Assistive device   Patient Reinforcement needed   End of Consult   Patient Position at End of Consult All needs within reach; Bedside chair   Gertrudis Manzanares, PTA

## 2023-05-11 NOTE — PLAN OF CARE
Problem: Potential for Falls  Goal: Patient will remain free of falls  Description: INTERVENTIONS:  - Educate patient/family on patient safety including physical limitations  - Instruct patient to call for assistance with activity   - Consult OT/PT to assist with strengthening/mobility   - Keep Call bell within reach  - Keep bed low and locked with side rails adjusted as appropriate  - Keep care items and personal belongings within reach  - Initiate and maintain comfort rounds  - Make Fall Risk Sign visible to staff  - Offer Toileting every  Hours, in advance of need  - Initiate/Maintain alarm  - Obtain necessary fall risk management equipment:   - Apply yellow socks and bracelet for high fall risk patients  - Consider moving patient to room near nurses station  Outcome: Progressing     Problem: MOBILITY - ADULT  Goal: Maintain or return to baseline ADL function  Description: INTERVENTIONS:  -  Assess patient's ability to carry out ADLs; assess patient's baseline for ADL function and identify physical deficits which impact ability to perform ADLs (bathing, care of mouth/teeth, toileting, grooming, dressing, etc )  - Assess/evaluate cause of self-care deficits   - Assess range of motion  - Assess patient's mobility; develop plan if impaired  - Assess patient's need for assistive devices and provide as appropriate  - Encourage maximum independence but intervene and supervise when necessary  - Involve family in performance of ADLs  - Assess for home care needs following discharge   - Consider OT consult to assist with ADL evaluation and planning for discharge  - Provide patient education as appropriate  Outcome: Progressing  Goal: Maintains/Returns to pre admission functional level  Description: INTERVENTIONS:  - Perform BMAT or MOVE assessment daily    - Set and communicate daily mobility goal to care team and patient/family/caregiver     - Collaborate with rehabilitation services on mobility goals if consulted  - Perform Range of Motion  times a day  - Reposition patient every  hours    - Dangle patient  times a day  - Stand patient  times a day  - Ambulate patient  times a day  - Out of bed to chair  times a day   - Out of bed for meals  times a day  - Out of bed for toileting  - Record patient progress and toleration of activity level   Outcome: Progressing     Problem: INFECTION - ADULT  Goal: Absence or prevention of progression during hospitalization  Description: INTERVENTIONS:  - Assess and monitor for signs and symptoms of infection  - Monitor lab/diagnostic results  - Monitor all insertion sites, i e  indwelling lines, tubes, and drains  - Monitor endotracheal if appropriate and nasal secretions for changes in amount and color  - [place] appropriate cooling/warming therapies per order  - Administer medications as ordered  - Instruct and encourage patient and family to use good hand hygiene technique  - Identify and instruct in appropriate isolation precautions for identified infection/condition  Outcome: Progressing  Goal: Absence of fever/infection during neutropenic period  Description: INTERVENTIONS:  - Monitor WBC     Outcome: Progressing     Problem: SAFETY ADULT  Goal: Patient will remain free of falls  Description: INTERVENTIONS:  - Educate patient/family on patient safety including physical limitations  - Instruct patient to call for assistance with activity   - Consult OT/PT to assist with strengthening/mobility   - Keep Call bell within reach  - Keep bed low and locked with side rails adjusted as appropriate  - Keep care items and personal belongings within reach  - Initiate and maintain comfort rounds  - Make Fall Risk Sign visible to staff  - Offer Toileting every  Hours, in advance of need  - Initiate/Maintain alarm  - Obtain necessary fall risk management equipment:   - Apply yellow socks and bracelet for high fall risk patients  - Consider moving patient to room near nurses station  Outcome: Progressing  Goal: Maintain or return to baseline ADL function  Description: INTERVENTIONS:  -  Assess patient's ability to carry out ADLs; assess patient's baseline for ADL function and identify physical deficits which impact ability to perform ADLs (bathing, care of mouth/teeth, toileting, grooming, dressing, etc )  - Assess/evaluate cause of self-care deficits   - Assess range of motion  - Assess patient's mobility; develop plan if impaired  - Assess patient's need for assistive devices and provide as appropriate  - Encourage maximum independence but intervene and supervise when necessary  - Involve family in performance of ADLs  - Assess for home care needs following discharge   - Consider OT consult to assist with ADL evaluation and planning for discharge  - Provide patient education as appropriate  Outcome: Progressing  Goal: Maintains/Returns to pre admission functional level  Description: INTERVENTIONS:  - Perform BMAT or MOVE assessment daily    - Set and communicate daily mobility goal to care team and patient/family/caregiver  - Collaborate with rehabilitation services on mobility goals if consulted  - Perform Range of Motion  times a day  - Reposition patient every  hours    - Dangle patient  times a day  - Stand patient  times a day  - Ambulate patient  times a day  - Out of bed to chair  times a day   - Out of bed for meals times a day  - Out of bed for toileting  - Record patient progress and toleration of activity level   Outcome: Progressing     Problem: CARDIOVASCULAR - ADULT  Goal: Maintains optimal cardiac output and hemodynamic stability  Description: INTERVENTIONS:  - Monitor I/O, vital signs and rhythm  - Monitor for S/S and trends of decreased cardiac output  - Administer and titrate ordered vasoactive medications to optimize hemodynamic stability  - Assess quality of pulses, skin color and temperature  - Assess for signs of decreased coronary artery perfusion  - Instruct patient to report change in severity of symptoms  Outcome: Progressing  Goal: Absence of cardiac dysrhythmias or at baseline rhythm  Description: INTERVENTIONS:  - Continuous cardiac monitoring, vital signs, obtain 12 lead EKG if ordered  - Administer antiarrhythmic and heart rate control medications as ordered  - Monitor electrolytes and administer replacement therapy as ordered  Outcome: Progressing     Problem: RESPIRATORY - ADULT  Goal: Achieves optimal ventilation and oxygenation  Description: INTERVENTIONS:  - Assess for changes in respiratory status  - Assess for changes in mentation and behavior  - Position to facilitate oxygenation and minimize respiratory effort  - Oxygen administered by appropriate delivery if ordered  - Initiate smoking cessation education as indicated  - Encourage broncho-pulmonary hygiene including cough, deep breathe, Incentive Spirometry  - Assess the need for suctioning and aspirate as needed  - Assess and instruct to report SOB or any respiratory difficulty  - Respiratory Therapy support as indicated  Outcome: Progressing     Problem: GASTROINTESTINAL - ADULT  Goal: Minimal or absence of nausea and/or vomiting  Description: INTERVENTIONS:  - Administer IV fluids if ordered to ensure adequate hydration  - Maintain NPO status until nausea and vomiting are resolved  - Nasogastric tube if ordered  - Administer ordered antiemetic medications as needed  - Provide nonpharmacologic comfort measures as appropriate  - Advance diet as tolerated, if ordered  - Consider nutrition services referral to assist patient with adequate nutrition and appropriate food choices  Outcome: Progressing  Goal: Maintains or returns to baseline bowel function  Description: INTERVENTIONS:  - Assess bowel function  - Encourage oral fluids to ensure adequate hydration  - Administer IV fluids if ordered to ensure adequate hydration  - Administer ordered medications as needed  - Encourage mobilization and activity  - Consider nutritional services referral to assist patient with adequate nutrition and appropriate food choices  Outcome: Progressing  Goal: Maintains adequate nutritional intake  Description: INTERVENTIONS:  - Monitor percentage of each meal consumed  - Identify factors contributing to decreased intake, treat as appropriate  - Assist with meals as needed  - Monitor I&O, weight, and lab values if indicated  - Obtain nutrition services referral as needed  Outcome: Progressing  Goal: Establish and maintain optimal ostomy function  Description: INTERVENTIONS:  - Assess bowel function  - Encourage oral fluids to ensure adequate hydration  - Administer IV fluids if ordered to ensure adequate hydration   - Administer ordered medications as needed  - Encourage mobilization and activity  - Nutrition services referral to assist patient with appropriate food choices  - Assess stoma site  - Consider wound care consult   Outcome: Progressing  Goal: Oral mucous membranes remain intact  Description: INTERVENTIONS  - Assess oral mucosa and hygiene practices  - Implement preventative oral hygiene regimen  - Implement oral medicated treatments as ordered  - Initiate Nutrition services referral as needed  Outcome: Progressing     Problem: METABOLIC, FLUID AND ELECTROLYTES - ADULT  Goal: Electrolytes maintained within normal limits  Description: INTERVENTIONS:  - Monitor labs and assess patient for signs and symptoms of electrolyte imbalances  - Administer electrolyte replacement as ordered  - Monitor response to electrolyte replacements, including repeat lab results as appropriate  - Instruct patient on fluid and nutrition as appropriate  Outcome: Progressing  Goal: Fluid balance maintained  Description: INTERVENTIONS:  - Monitor labs   - Monitor I/O and WT  - Instruct patient on fluid and nutrition as appropriate  - Assess for signs & symptoms of volume excess or deficit  Outcome: Progressing  Goal: Glucose maintained within target range  Description: INTERVENTIONS:  - Monitor Blood Glucose as ordered  - Assess for signs and symptoms of hyperglycemia and hypoglycemia  - Administer ordered medications to maintain glucose within target range  - Assess nutritional intake and initiate nutrition service referral as needed  Outcome: Progressing     Problem: Prexisting or High Potential for Compromised Skin Integrity  Goal: Skin integrity is maintained or improved  Description: INTERVENTIONS:  - Identify patients at risk for skin breakdown  - Assess and monitor skin integrity  - Assess and monitor nutrition and hydration status  - Monitor labs   - Assess for incontinence   - Turn and reposition patient  - Assist with mobility/ambulation  - Relieve pressure over bony prominences  - Avoid friction and shearing  - Provide appropriate hygiene as needed including keeping skin clean and dry  - Evaluate need for skin moisturizer/barrier cream  - Collaborate with interdisciplinary team   - Patient/family teaching  - Consider wound care consult   Outcome: Progressing     Problem: Nutrition/Hydration-ADULT  Goal: Nutrient/Hydration intake appropriate for improving, restoring or maintaining nutritional needs  Description: Monitor and assess patient's nutrition/hydration status for malnutrition  Collaborate with interdisciplinary team and initiate plan and interventions as ordered  Monitor patient's weight and dietary intake as ordered or per policy  Utilize nutrition screening tool and intervene as necessary  Determine patient's food preferences and provide high-protein, high-caloric foods as appropriate       INTERVENTIONS:  - Monitor oral intake, urinary output, labs, and treatment plans  - Assess nutrition and hydration status and recommend course of action  - Evaluate amount of meals eaten  - Assist patient with eating if necessary   - Allow adequate time for meals  - Recommend/ encourage appropriate diets, oral nutritional supplements, and vitamin/mineral supplements  - Order, calculate, and assess calorie counts as needed  - Recommend, monitor, and adjust tube feedings and TPN/PPN based on assessed needs  - Assess need for intravenous fluids  - Provide specific nutrition/hydration education as appropriate  - Include patient/family/caregiver in decisions related to nutrition  Outcome: Progressing

## 2023-05-11 NOTE — ASSESSMENT & PLAN NOTE
Positive BAL for Pneumocystis carinii pneumonia  Risk factors in the includes steroid use in the setting of metastatic malignancy and possible BRAF therapy  · Antibiotic management per infectious disease  Previously on IV TMP-SMX and steroids, now on p o  Bactrim to complete 21-day course through 5/24/2023  · Infectious disease recommending 21 days of adjunctive corticosteroids for his hypoxia  Patient currently on chronic Decadron which they state will be sufficient for this purpose

## 2023-05-11 NOTE — ASSESSMENT & PLAN NOTE
Sodium levels downtrending  Hydrohlothiazide held     · Fluid restriction 1500    Recent Labs     05/09/23  0604 05/10/23  0458 05/11/23  0438   SODIUM 126* 125* 129*     Results from last 7 days   Lab Units 05/09/23  1630   OSMO UR mmol/   SODIUM UR  86

## 2023-05-11 NOTE — ASSESSMENT & PLAN NOTE
Possible steroids, bactrim, or hyponatremia  Patient reports that hallucinations somehow improved today  Will assess for further improvement now that the steroids and bactrim dose was decreased  Hyponatremia also improving

## 2023-05-11 NOTE — PROGRESS NOTES
Progress Note - Infectious Disease   Marissa Moore 70 y o  male MRN: [id number]  Unit/Bed#: [place] [facility] 2 -01 Encounter: [id number]      Impression/Plan:    1  Pneumocystis pneumonia:  - Patient with new hypoxia and CXR / CT chest show new bilateral GGO concerning for PJP in immunosuppressed patient on chronic steroids  PJP DFA from BAL positive from multiple samples  Additional infectious workup negative including RP2 negative, COVID19, BAL cultures, BAL Histoplasma antigen, BAL Aspergillus antigen  Patient lived in [place] for 15 years, thus consider possibility of TB, though no other risk factors and unlikely given three BAL AFB negative smears  He is now clinically improving and stable on room air since starting Bactrim  - continue PO Bactrim 2 DS tabs every 8 hours   - monitor CrCl and potassium; current dose ok, though will follow renal function closely   - plan for 21 day course total, through 5/24  - will also need 21 day course of adjunctive steroids; he should be on the equivalent of Prednisone 40 mg daily through 5/13 and then on Prednisone 20 mg daily through 5/24; current dose of Decadron is adequate  -following treatment, would benefit from secondary prophylaxis while on lifelong steroids   -will need outpatient ID follow up upon discharge; will notify office when closer to d/c  - monitor CBC + diff and BMP weekly while on therapy after DC    2  Active Hallucinations:  - Patient reports started about 3 weeks ago but are getting worse  He often sees his dog in the room and sometimes thinks he is at home  Suspect less likely from Bactrim given symptoms started before the antibiotic  Consider side effect of his steroids for which he was on at home as well    - primary team discussing with patient's Hematologist regarding possible decreasing steroid dose  - Bactrim dose decreased as well on 5/10  - monitor for improvement    3  Acute respiratory failure with hypoxia: Resolved  - This is presumed secondary to #1  Patient is now improving and weaned off oxygen since starting treatment as above  - antibiotics and steroids for PJP as above  - follow up TB PCR from BAL, though as above clinical concern low  -close pulmonology follow up      4  ARABELLA: Estimated Creatinine Clearance: 43 9 mL/min (A) (by C-G formula based on SCr of 1 71 mg/dL (H))  - developed after starting Bactrim  Suspect this is largely medication side effect  Nephrology following   - daily BMP for now  - dose of Bactrim as above     5  Hx of malignant melanoma, mets to brain, lung, liver, LN, bone  S/p whole brain radiation  Briefly started on treatment with encorafenib/binimetinib and was supposed to restart on 4/3 but now on hold due to recent acute illness  Follows with outpatient heme/onc, Dr Miranda    -close onc follow up      6  Chronic diarrhea: Known hx of C diff in the past  Recent admission for diverticulitis  CT A/P negative  C  Diff PCR positive with negative EIA  Likely colonized  -monitor closely for development of diarrhea     7  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, 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  Follows outpatient heme/onc  Previously treated with Rituxan and recently restarted on dexamethasone for #3  Follows Dr Josephine Carballo  -monitor CBC  -as above, discussing steroids dose with Heme     8  Hx PE on Pradaxa  CTA chest negative for VTE  Plan and recommendations were discussed with primary team     Antibiotics:  TMP-SMX    Subjective:  Patient remains afebrile and on room air  WBC stagnant at 18, though remains on chronic decadron  Today denies fever, chills, diarrhea, shortness of breath  No hallucinations yet today      Objective:  Vitals:  Temp:  [97 7 °F (36 5 °C)-98 2 °F (36 8 °C)] 98 2 °F (36 8 °C)  HR:  [74-96] 80  Resp:  [16-20] 16  BP: (117-136)/(74-79) 117/74  SpO2:  [92 %-96 %] 93 %  Temp (24hrs), Av 9 °F (36 6 °C), Min:97 7 °F (36 5 °C), Max:98 2 °F (36 8 °C)  Current: Temperature: 98 2 °F (36 8 °C)    Physical Exam:   General Appearance:  Alert, interactive, nontoxic, no acute distress  Throat: Oropharynx moist without lesions  Lungs:    respirations unlabored   Heart:  RRR; no murmur   Abdomen:   Soft, non-tender      Extremities: No clubbing, cyanosis or edema   Skin: No new rashes or lesions  No draining wounds noted  Labs:    All pertinent labs and imaging studies were personally reviewed  Results from last 7 days   Lab Units 05/11/23  0438 05/10/23  0458 05/09/23  0604   WBC Thousand/uL 18 11* 18 10* 18 95*   HEMOGLOBIN g/dL 12 6 11 0* 11 5*   PLATELETS Thousands/uL 688* 609* 547*     Results from last 7 days   Lab Units 05/11/23  0438 05/10/23  0458 05/09/23  0604   SODIUM mmol/L 129* 125* 126*   POTASSIUM mmol/L 4 3 4 5 4 3   CHLORIDE mmol/L 94* 94* 93*   CO2 mmol/L 21 21 22   BUN mg/dL 36* 32* 26*   CREATININE mg/dL 1 71* 1 62* 1 53*   EGFR ml/min/1 73sq m 39 42 45   CALCIUM mg/dL 9 7 9 1 9 2             Results from last 7 days   Lab Units 05/06/23  0540   FERRITIN ng/mL 2,579*           Micro:        Imaging:          Chan Santos MD  [facility]

## 2023-05-11 NOTE — PROGRESS NOTES
[address] [place] [address]  Progress Note  Name: Edda Gates  MRN: [id number]  Unit/Bed#: [place] 2 -01 I Date of Admission: 4/30/2023   Date of Service: 5/11/2023 I Hospital Day: 11    Assessment/Plan   * Acute respiratory failure with hypoxia [facility])  Assessment & Plan  69-year-old male presenting with cough, chest tightness, shortness of breath, and generalized weakness likely secondary to Pneomocystis Carinii Pneumonia (Diagnosed through bronchoscopy, BAL positive for this)  • Improved  Currently on room air    Hallucination  Assessment & Plan  Possible steroids, bactrim, or hyponatremia  Patient reports that hallucinations somehow improved today  Will assess for further improvement now that the steroids and bactrim dose was decreased  Hyponatremia also improving  Pneumocystis carinii pneumonia ([facility] [address] )  Assessment & Plan  Positive BAL for Pneumocystis carinii pneumonia  Risk factors in the includes steroid use in the setting of metastatic malignancy and possible BRAF therapy  · Antibiotic management per infectious disease  Previously on IV TMP-SMX and steroids, now on p o  Bactrim to complete 21-day course through 5/24/2023  · Infectious disease recommending 21 days of adjunctive corticosteroids for his hypoxia  Patient currently on chronic Decadron which they state will be sufficient for this purpose  Acute kidney injury [facility])  Assessment & Plan  Possibly due to hydrochlorothiazide, bactrim  · Due to uptrending levels will have renal evaluate him  · Urinary retention protocol  · US kidney and bladder    Recent Labs     05/09/23  0604 05/10/23  0458 05/11/23  0438   BUN 26* 32* 36*   CREATININE 1 53* 1 62* 1 71*   EGFR 45 42 39       Results from last 7 days   Lab Units 05/09/23  1630   SODIUM UR  86         Autoimmune hemolytic anemia  Assessment & Plan  History of warm antibody hemolytic anemia  Previously treated with Rituxan (8/2022 last received)     • Continue chronic steroid treatment with Dexamethasone  • Spoke to hematology-oncology (Manual Shearing), May taper Dexamethasone from 4mg Q8 hours to Q12 hours due to side effects (Hallucinations)  (Dexamethasone is for the mets to his brain, previously on prednisone for the hemolytic anemia)  • Appreciate hematology-oncology recommendations  History of pulmonary embolism  Assessment & Plan  Maintained on Pradaxa    Hyponatremia  Assessment & Plan  Sodium levels downtrending  Hydrohlothiazide held  · Fluid restriction 1500    Recent Labs     23  0604 05/10/23  0458 23  0438   SODIUM 126* 125* 129*     Results from last 7 days   Lab Units 23  1630   OSMO UR mmol/   SODIUM UR  86         History of Clostridium difficile infection  Assessment & Plan  Hx of diverticulitis, C  Diff in the past  C  Diff PCR positive, but negative toxins  • Continue po vancomycin prophylaxis    Metastatic melanoma [facility])  Assessment & Plan  Biopsy confirmed 3/17/2023  Right peritoneum consistent with malignant melanoma with brain mets  BRAF mutation  · Encorafenib and Binimetinib started 2023 (Currently on hold)           VTE Pharmacologic Prophylaxis:   Pharmacologic: Dabigatran (Pradaxa)  Mechanical VTE Prophylaxis in Place: Yes    Discussions with Specialists or Other Care Team Provider: nursing, cm, ID    Education and Discussions with Family / Patient: patient, wife    Current Length of Stay: 6 day(s)    Current Patient Status: Inpatient   Certification Statement: The patient will continue to require additional inpatient hospital stay due to hallucinations, then possible placcement    Discharge Plan: 24-48 hours    Code Status: Level 1 - Full Code      Subjective:   Patient seen and examined at bedside  Dexamethasone dose   Patient still with some hallucinations, but much improved  Updated his wife about this       Objective:     Vitals:   Temp (24hrs), Av 9 °F (36 6 °C), Min:97 7 °F (36 5 °C), Max:98 2 °F (36 8 °C)    Temp:  [97 7 °F (36 5 °C)-98 2 °F (36 8 °C)] 98 2 °F (36 8 °C)  HR:  [74-96] 80  Resp:  [16-20] 16  BP: (117-136)/(74-79) 117/74  SpO2:  [92 %-96 %] 93 %  Body mass index is 29 36 kg/m²  Input and Output Summary (last 24 hours): Intake/Output Summary (Last 24 hours) at 5/11/2023 0845  Last data filed at 5/10/2023 0916  Gross per 24 hour   Intake 120 ml   Output 450 ml   Net -330 ml       Physical Exam:     Physical Exam  Vitals reviewed  Constitutional:       General: He is not in acute distress  HENT:      Head: Normocephalic  Nose: Nose normal       Mouth/Throat:      Mouth: Mucous membranes are moist    Eyes:      General: No scleral icterus  Cardiovascular:      Rate and Rhythm: Normal rate and regular rhythm  Pulmonary:      Effort: Pulmonary effort is normal  No respiratory distress  Abdominal:      General: There is no distension  Palpations: Abdomen is soft  Tenderness: There is no abdominal tenderness  Skin:     General: Skin is warm  Neurological:      Mental Status: He is alert  Mental status is at baseline  Psychiatric:         Mood and Affect: Mood normal          Behavior: Behavior normal        Additional Data:     Labs:    Results from last 7 days   Lab Units 05/11/23  0438 05/10/23  0458 05/09/23  0604   WBC Thousand/uL 18 11*   < > 18 95*   HEMOGLOBIN g/dL 12 6   < > 11 5*   HEMATOCRIT % 38 0   < > 33 6*   PLATELETS Thousands/uL 688*   < > 547*   BANDS PCT %  --   --  7   NEUTROS PCT % 72  --   --    LYMPHS PCT % 18  --   --    LYMPHO PCT %  --   --  8*   MONOS PCT % 6  --   --    MONO PCT %  --   --  6   EOS PCT % 0  --  0    < > = values in this interval not displayed       Results from last 7 days   Lab Units 05/11/23  0438   SODIUM mmol/L 129*   POTASSIUM mmol/L 4 3   CHLORIDE mmol/L 94*   CO2 mmol/L 21   BUN mg/dL 36*   CREATININE mg/dL 1 71*   ANION GAP mmol/L 14*   CALCIUM mg/dL 9 7   GLUCOSE RANDOM mg/dL 91                           * I Have Reviewed All Lab Data Listed Above  * Additional Pertinent Lab Tests Reviewed: [address] [address] Admission Reviewed      Lines:   Invasive Devices     Peripheral Intravenous Line  Duration           Peripheral IV 05/07/23 Left;Ventral (anterior) Forearm 4 days    Peripheral IV 05/11/23 Proximal;Right;Ventral (anterior) Forearm <1 day                   Imaging:    Imaging Reports Reviewed Today Include: no new imaging    Recent Cultures (last 7 days):           Last 24 Hours Medication List:   Current Facility-Administered Medications   Medication Dose Route Frequency Provider Last Rate   • acetaminophen  650 mg Oral Q6H PRN Claudell Manning, MD     • albuterol  2 puff Inhalation Q4H PRN Claudell Manning, MD     • ALPRAZolam  0 5 mg Oral HS PRN Claudell Manning, MD     • aluminum-magnesium hydroxide-simethicone  30 mL Oral Q6H PRN Claudell Manning, MD     • benzonatate  100 mg Oral TID PRN Claudell Manning, MD     • carbamide peroxide  5 drop Both Ears BID Claudell Manning, MD     • dabigatran etexilate  150 mg Oral Q12H [address] Claudell Manning, MD     • dexamethasone  4 mg Oral Q12H [address] [facility], [address]     • dextromethorphan-guaiFENesin  10 mL Oral Q4H PRN Claudell Manning, MD     • memantine  10 mg Oral BID Claudell Manning, MD     • metoprolol succinate  12 5 mg Oral Daily Claudell Manning, MD     • ondansetron  4 mg Intravenous Q6H PRN Claudell Manning, MD     • pantoprazole  40 mg Oral Early Morning Claudell Manning, MD     • polyethylene glycol  17 g Oral Daily PRN Claudell Manning, MD     • prazosin  1 mg Oral HS VISHNU Galdamez     • sulfamethoxazole-trimethoprim  2 tablet Oral [facility] Mandie Ruiz MD          Today, Patient Was Seen By: Valerio Sommer MD    ** Please Note: Dictation voice to text software may have been used in the creation of this document   **

## 2023-05-11 NOTE — PROGRESS NOTES
Medical Oncology/Hematology Progress Note  Maggi Sawyer, male, 70 y o , 1955,  [place] [address] Neeta Pierson*, [id number]     Patient is a 75 y  o  male with PMH of autoimmune hemolytic anemia followed by Dr Tita Phoenix, and metastatic melanoma followed by Dr Julio Schmitt  He is currently on adjuvant encorafenib and binimetinib for his melanoma  He presented to the ED with shortness of breath and diarrhea since last admission  He was admitted for acute diverticulitis and was discharged last week, 4/14/23  CT imaging showing diverticulitis has resolved  Hematology-oncology was initially consulted to offer recommendations for any treatment while inpatient  Primary service informed hematology yesterday that patient has been hallucinations  Currently tapering his steroid and ordered MRI of the brain in the setting of brain mets with whole brain radiation  Assessment and Plan:  1  Metastatic melanoma   -Biopsy (3/17/2023) R peritoneum consistent with malignant melanoma  Metastatic to brain   -Has BRAF mutation, therefore, placed on encorafenib and binimetinib since the beginning of April 2023  -MRI brain (3/4/23)- Too numerous to count metastatic lesions in bilateral cerebral hemispheres and to a lesser extent in bilateral cerebellum with perilesional vasogenic edema (bilateral frontal, bilateral parietal, right subinsular, left temporal, and bilateral occipital lobes - worse in right frontal lobe)  -CT C/A/P (4/22/23) -Multiple subtle enhancing lesions throughout the liver in keeping with numerous hepatic metastases  Known pulmonary and retroperitoneal metastases   Unchanged right iliac chain and inguinal adenopathy in this patient with metastatic melanoma   Known osseous metastases are not well visualized on this study  CT A/P (4/30/23)-  New bilateral lung groundglass opacities and infiltrates, likely inflammatory/infectious  Scattered small lung nodules again visualized, suspicious for metastases  Scattered liver lesions again visualized, most concerning for metastases  Multiple intra-abdominal and right inguinal nodules, likely metastatic  Known osseous metastases     2  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  -Was on Rituxan in the past, last received on 8/2022   -Currently on chronic prednisone treatment, dexamethasone 4 mg every 8 hours  -Hemoglobin stable:  11 9 (5/1/23) <10 5 (4/24) <1 7<14 0 (admission date)<12 3  -, MCHC 33 2, macrocytic, normochromic  -Vitamin B-12 815  (3/11/21)   -Folate 8 6 (3/11/21)   -Leukocytosis secondary to his chronic steroids exacerbated by his respiratory inflammatory process as outlined below    3  Acute respiratory failure with hypoxia  -Presented with nonproductive cough, chest tightness, shortness of breath, generalized weakness  Improved  -Imaging showed new bilateral groundglass opacities, scattered pulmonary/liver/inguinal nodules  -Of note, currently on room air  PLAN:  1) Continue to taper dexamethasone 4 mg q8 hours down to 4 mg twice daily  Will monitor for improvement  If neurological concerns persists, may consider further tapering of steroids to dexamethasone 2 mg twice daily by next week  2) Placed order for MRI brain as patient having hallucinations in the setting of brain metastasis treated with whole brain radiation  Dr Lincoln Montero, melanoma oncologist, is in recommending for this plan  3) Continue to hold  encorafenib (Braftovi) and binimetinib (Mektovi) while inpatient    3) Transfuse as needed for hemoglobin <7 g/dL  Patient's hemoglobin stable at 11 9 g/dL  No signs of bleeding  Denies hematemesis, hematochezia, hematuria, epistaxis  Treatment as above with steroid  Ordered vitamin B12, folate, iron panel    Interval history:  Patient reported that his hallucinations are a bit better  He reported that he has been having hallucinations for approximately 2 weeks now, but has now become frequent   Yesterday, he thought he saw a cat scratching on the curtain in his room  Discussed that we will pursue brain imaging in light of this presentation, especially with his brain metastasis treated with whole brain radiation  Outpatient follow up plan:   Appointment with Dr Danika Lopez on 5/3/2023  This was canceled by the patient  Will be rescheduled via [facility] [place]  Appointment with Dr Makayla Rodrigues on 11/6/2023  Thank you for this consult  Liban Leyva, MIKO, VISHNU  Hematology-Oncology     History of present illness:  Aidan Matson is a 70 y o  male  with PMH of hypertension, GERD, hyperglycemia, hypercholesterolemia  He is currently being followed by Dr Baron Bernheim for his 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 and Dr Selin Betancur for his melanoma  He presented to the ED with stool incontinence  He reported recurrence of diarrhea since last admission; recently admitted for acute diverticulitis 4/11-4/14, now resolved on CT imaging  For his autoimmune hemolytic anemia, he has been taking prednisone chronically with weekly CBCs  For his metatastic melanoma, he's currently on adjuvant encorafenib and binimetinib  he was recently admitted on 04/11/23 for diverticulitis, then 04/22/23 again for diarrhea/ARABELLA  He was then discharged on 04/25/23  From last admission, hematology-oncology was consulted for patient is concerned about his hemolytic anemia  Discussed that his hemoglobin has been stable then, hemoglobin stable as well for this admission  Patient presented to the ED on 4/30/2023 for shortness of breath, weakness, diarrhea  Review of Systems:   Review of Systems   Constitutional: Positive for activity change and fatigue  Negative for chills and fever  HENT: Negative for ear pain and sore throat  Eyes: Negative for pain and visual disturbance  Respiratory: Negative for cough and shortness of breath  Cardiovascular: Negative for chest pain and palpitations     Gastrointestinal: Negative for abdominal pain, diarrhea and vomiting  Genitourinary: Negative for dysuria and hematuria  Musculoskeletal: Negative for arthralgias and back pain  Skin: Negative for color change and rash  Neurological: Positive for weakness  Negative for seizures and syncope  Psychiatric/Behavioral: The patient is nervous/anxious  All other systems reviewed and are negative  Oncology History:   Cancer Staging   Personal history of malignant melanoma  Staging form: Melanoma of the Skin, AJCC 8th Edition  - Clinical stage from 5/31/2022: Stage IIB (cT4a, cN0, cM0) - Signed by Jose Corona MD on 8/25/2022  - Pathologic stage from 7/29/2022: Stage IIIC (pT4a, pN1a, cM0) - Signed by Jose Corona MD on 8/25/2022    Oncology History Overview Note   History of Stage IIIc melanoma  Completed whole brain RT on 3/29/23  Today's appointment is an EOT phone follow-up    4/11/23-4/14/23  Admitted, [facility]  DX: weakness, acute diverticulitis, diarrhea    4/22/23-4/25/23  Admitted [facility]  DX: Sepsis, ARABELLA, PE with acute cor pulmonale    4/30/23- present  [facility]  DX: generalized weakness         Metastatic melanoma ([address] )   5/31/2022 Biopsy    Right Leg, Shave Biopsy   Melanoma extending to the deep specimen margin  Thickness: 7 0mm  Ulceration: not seen   Mitoses: 3      5/31/2022 -  Cancer Staged    Staging form: Melanoma of the Skin, AJCC 8th Edition  - Clinical stage from 5/31/2022: Stage IIB (cT4a, cN0, cM0) - Signed by Jose Corona MD on 8/25/2022 7/1/2022 Initial Diagnosis    Malignant melanoma of leg, right ([address] )     7/29/2022 Surgery    A  Lymph node, sentinel, #1:  One lymph node with rare metastatic melanoma cells (1/1), subcapsular location  Immunohistochemical study for MART-1, HMB45 and S100 supports the findings  B  Leg, right, knee, wide excision:  Residual melanoma, nodular type, and scar; margins free  See synoptic report       7/29/2022 -  Cancer Staged    Staging form: Melanoma of the Skin, AJCC 8th Edition  - Pathologic stage from 7/29/2022: Stage IIIC (pT4a, pN1a, cM0) - Signed by Edwin Peters MD on 8/25/2022 12/6/2022 Biopsy    Final Diagnosis   A  Skin, Right dorsal hand, Shave biopsy:  Squamous cell carcinoma in-situ, at least; extending to biopsy margins            Radiation    Treatment:  Course: C1    Plan ID Energy Fractions Dose per Fraction (cGy) Dose Correction (cGy) Total Dose Delivered (cGy) Elapsed Days   Whole Brai # 6X 10 / 10 300 0 3,000 13      Treatment dates:  C1: 3/16/2023 - 3/29/2023             Past Medical History:   Past Medical History:   Diagnosis Date   • Acute diverticulitis 4/12/2023   • ARABELLA (acute kidney injury) ([address] ) 3/29/2018   • Anemia 11/02/2016   • Anxiety    • Arthritis    • Autoimmune hemolytic anemia ([address] )    • Claustrophobia    • COVID 12/2020   • DVT (deep venous thrombosis) (HCC)    • GERD (gastroesophageal reflux disease)    • Hearing loss, right    • Hemolytic anemia (HCC)    • History of transfusion     2018 - no adverse reaction   • Hypertension    • Malignant melanoma of leg, right ([address] ) 07/01/2022   • Palpitation    • Portal vein thrombosis    • PTSD (post-traumatic stress disorder)    • Pulmonary emboli (HCC)    • Squamous cell skin cancer 12/20/2022    SCCIS- 12/6/22   • Tobacco abuse        Past Surgical History:   Procedure Laterality Date   • CATARACT EXTRACTION Bilateral    • CHOLECYSTECTOMY  07/18/2017   • COLONOSCOPY     • ELBOW ARTHROPLASTY Left     bursectomy   • IR BIOPSY RETROPERITONEUM  3/17/2023   • IR DRAINAGE TUBE CHECK WITH SCLEROSIS  10/06/2022   • IR DRAINAGE TUBE CHECK WITH SCLEROSIS  10/10/2022   • IR DRAINAGE TUBE CHECK WITH SCLEROSIS  10/20/2022   • IR DRAINAGE TUBE CHECK WITH SCLEROSIS  10/27/2022   • IR DRAINAGE TUBE CHECK WITH SCLEROSIS  11/04/2022   • IR DRAINAGE TUBE CHECK WITH SCLEROSIS  11/15/2022   • IR DRAINAGE TUBE CHECK WITH SCLEROSIS  11/25/2022   • IR DRAINAGE TUBE PLACEMENT  09/19/2022   • JOINT REPLACEMENT Right 02/02/2021    knee   • KNEE SURGERY Right     meniscus tear   • LYMPH NODE BIOPSY Right 07/29/2022    Procedure: BIOPSY LYMPH NODE SENTINEL;  Surgeon: Dev Lockhart MD;  [facility];  Service: Surgical Oncology   • MOHS SURGERY Right 12/20/2022    Right dorsal hand SCCIS-Dr Isabel   • OK ARTHRP KNE CONDYLE&PLATU MEDIAL&LAT COMPARTMENTS Right 02/02/2021    Procedure: ARTHROPLASTY KNEE TOTAL;  Surgeon: Mercy Dunbar MD;  [facility];  Service: Orthopedics   • OK ARTHRP KNE CONDYLE&PLATU MEDIAL&LAT COMPARTMENTS Left 11/17/2021    Procedure: TOTAL KNEE REPLACEMENT;  Surgeon: Mercy Dunbar MD;  [facility];  Service: Orthopedics   • OK LAPS SURG CHOLECYSTECTOMY Gopal Kohut N/A 12/23/2017    Procedure: CHOLECYSTECTOMY LAPAROSCOPIC with cholangiogram;  Surgeon: Sueann Simmonds, MD;  [facility];  Service: General   • OK SPLENECTOMY TOTAL SEPARATE PROCEDURE N/A 05/18/2017    Procedure: LAPAROSCOPIC HAND ASSIST SPLENECTOMY;  Surgeon: Estefania Laws MD;  [facility];  Service: Surgical Oncology   • SHOULDER SURGERY Left     rotator cuff x4, reconstruction   • SKIN BIOPSY  5 May 2022   • SKIN CANCER EXCISION  29 July 2022   • SKIN LESION EXCISION Right 07/29/2022    Procedure: WIDE EXCISION RIGHT MEDIAL THIGH;  Surgeon: Dev Lockhart MD;  [facility];  Service: Surgical Oncology       Family History   Problem Relation Age of Onset   • Cancer Mother    • Breast cancer Mother    • Other Father         CABG   • Heart attack Paternal Grandfather         acute MI       Social History     Socioeconomic History   • Marital status: /Civil Union     Spouse name: None   • Number of children: None   • Years of education: None   • Highest education level: None   Occupational History   • None   Tobacco Use   • Smoking status: Some Days     Packs/day: 0 00     Years: 5 00     Pack years: 0 00     Types: Cigars, Cigarettes   • Smokeless tobacco: Current     Types: Snuff   • Tobacco comments:     5  a week average   Vaping Use   • Vaping Use: Never used   Substance and Sexual Activity   • Alcohol use: Yes     Alcohol/week: 6 0 standard drinks     Types: 6 Cans of beer per week     Comment: socially   • Drug use: Yes     Types: Marijuana     Comment: medical marijuana   • Sexual activity: Yes     Partners: Female     Birth control/protection: None   Other Topics Concern   • None   Social History Narrative    Daily coffee consumption (2 cups/day)    reitred     Social Determinants of Health     Financial Resource Strain: Not on file   Food Insecurity: No Food Insecurity   • Worried About Running Out of Food in the Last Year: Never true   • Ran Out of Food in the Last Year: Never true   Transportation Needs: No Transportation Needs   • Lack of Transportation (Medical): No   • Lack of Transportation (Non-Medical):  No   Physical Activity: Not on file   Stress: Not on file   Social Connections: Not on file   Intimate Partner Violence: Not on file   Housing Stability: Low Risk    • Unable to Pay for Housing in the Last Year: No   • Number of Places Lived in the Last Year: 1   • Unstable Housing in the Last Year: No         Current Facility-Administered Medications:   •  acetaminophen (TYLENOL) tablet 650 mg, 650 mg, Oral, Q6H PRN, Pamela Ulloa MD  •  albuterol (PROVENTIL HFA,VENTOLIN HFA) inhaler 2 puff, 2 puff, Inhalation, Q4H PRN, Pamela Ulloa MD, 2 puff at 05/05/23 1346  •  ALPRAZolam (XANAX) tablet 0 5 mg, 0 5 mg, Oral, HS PRN, Pamela Ulloa MD  •  aluminum-magnesium hydroxide-simethicone (MYLANTA) oral suspension 30 mL, 30 mL, Oral, Q6H PRN, Pamela Ulloa MD  •  benzonatate (TESSALON PERLES) capsule 100 mg, 100 mg, Oral, TID PRN, Pranav Paz MD, 100 mg at 04/30/23 6640  •  carbamide peroxide (DEBROX) 6 5 % otic solution 5 drop, 5 drop, Both Ears, BID, Pamela Ulloa MD, 5 drop at 05/11/23 0932  •  dabigatran etexilate (PRADAXA) capsule 150 mg, 150 mg, Oral, Q12H [address], Tiara Beebe MD, 150 mg at 05/11/23 0935  •  dexamethasone (DECADRON) tablet 4 mg, 4 mg, Oral, Q12H [address], Dorian Raman, CRNP, 4 mg at 05/11/23 0935  •  dextromethorphan-guaiFENesin (ROBITUSSIN DM) oral syrup 10 mL, 10 mL, Oral, Q4H PRN, Tiara Beebe MD, 10 mL at 05/01/23 1405  •  dextrose 5 % and sodium chloride 0 9 % infusion, 50 mL/hr, Intravenous, Continuous, Ty Erinn Waddell DO, Last Rate: 50 mL/hr at 05/11/23 1031, 50 mL/hr at 05/11/23 1031  •  memantine (NAMENDA) tablet 10 mg, 10 mg, Oral, BID, Tiara Beebe MD, 10 mg at 05/11/23 7743  •  metoprolol succinate (TOPROL-XL) 24 hr tablet 12 5 mg, 12 5 mg, Oral, Daily, Tiara Beebe MD, 12 5 mg at 05/11/23 0936  •  ondansetron (ZOFRAN) injection 4 mg, 4 mg, Intravenous, Q6H PRN, Tiara Beebe MD  •  pantoprazole (PROTONIX) EC tablet 40 mg, 40 mg, Oral, Early Morning, Tiara Beebe MD, 40 mg at 05/11/23 0511  •  polyethylene glycol (MIRALAX) packet 17 g, 17 g, Oral, Daily PRN, Tiara Beebe MD  •  prazosin (MINIPRESS) capsule 1 mg, 1 mg, Oral, HS, Danny Cancer, CRNP  •  sulfamethoxazole-trimethoprim (BACTRIM DS) 800-160 mg per tablet 2 tablet, 2 tablet, Oral, Q8H [address], Ana Rosa Whipple MD, 2 tablet at 05/11/23 0511    Medications Prior to Admission   Medication   • acetaminophen (TYLENOL) 325 mg tablet   • ALPRAZolam (XANAX) 0 5 mg tablet   • binimetinib (MEKTOVI) 15 MG tablet   • dabigatran etexilate (PRADAXA) 150 mg capsu   • dexamethasone (DECADRON) 4 mg tablet   • encorafenib (BRAFTOVI) 50 MG capsule   • hydrochlorothiazide (HYDRODIURIL) 25 mg tablet   • memantine (Namenda) 10 mg tablet   • metoprolol succinate (TOPROL-XL) 25 mg 24 hr tablet   • ondansetron (ZOFRAN) 4 mg tablet   • pantoprazole (PROTONIX) 40 mg tablet   • potassium chloride (K-DUR,KLOR-CON) 20 mEq tablet   • prazosin (MINIPRESS) 1 mg capsule   • "VITAMIN D PO   • ascorbic acid (VITAMIN C) 1000 MG tablet       No Known Allergies      Physical Exam:     /74   Pulse 80   Temp 98 2 °F (36 8 °C)   Resp 16   Ht 5' 8\" (1 727 m)   Wt 87 6 kg (193 lb 2 oz)   SpO2 93%   BMI 29 36 kg/m²     Physical Exam  Constitutional:       Appearance: He is obese  HENT:      Head: Normocephalic  Mouth/Throat:      Mouth: Mucous membranes are dry  Cardiovascular:      Rate and Rhythm: Normal rate and regular rhythm  Pulmonary:      Comments: On room air  Abdominal:      General: Bowel sounds are normal       Palpations: Abdomen is soft  Skin:     General: Skin is warm and dry  Findings: Bruising present  Neurological:      General: No focal deficit present  Mental Status: He is alert  Motor: Weakness present  Psychiatric:         Behavior: Behavior normal          Thought Content:  Thought content normal            Recent Results (from the past 48 hour(s))   Sodium, urine, random    Collection Time: 05/09/23  4:30 PM   Result Value Ref Range    Sodium, Ur 86    Osmolality, urine    Collection Time: 05/09/23  4:30 PM   Result Value Ref Range    Osmolality, Ur 741 250 - 900 mmol/KG   Sodium, urine, random    Collection Time: 05/09/23  4:30 PM   Result Value Ref Range    Sodium, Ur 65    CBC and differential    Collection Time: 05/10/23  4:58 AM   Result Value Ref Range    WBC 18 10 (H) 4 31 - 10 16 Thousand/uL    RBC 3 12 (L) 3 88 - 5 62 Million/uL    Hemoglobin 11 0 (L) 12 0 - 17 0 g/dL    Hematocrit 32 0 (L) 36 5 - 49 3 %     (H) 82 - 98 fL    MCH 35 3 (H) 26 8 - 34 3 pg    MCHC 34 4 31 4 - 37 4 g/dL    RDW 13 9 11 6 - 15 1 %    MPV 9 6 8 9 - 12 7 fL    Platelets 612 (H) 399 - 390 Thousands/uL    nRBC 1 /100 WBCs   Basic metabolic panel    Collection Time: 05/10/23  4:58 AM   Result Value Ref Range    Sodium 125 (L) 135 - 147 mmol/L    Potassium 4 5 3 5 - 5 3 mmol/L    Chloride 94 (L) 96 - 108 mmol/L    CO2 21 21 - 32 mmol/L    " ANION GAP 10 4 - 13 mmol/L    BUN 32 (H) 5 - 25 mg/dL    Creatinine 1 62 (H) 0 60 - 1 30 mg/dL    Glucose 96 65 - 140 mg/dL    Calcium 9 1 8 4 - 10 2 mg/dL    eGFR 42 ml/min/1 73sq m   Magnesium    Collection Time: 05/10/23  4:58 AM   Result Value Ref Range    Magnesium 1 9 1 9 - 2 7 mg/dL   CBC and differential    Collection Time: 05/11/23  4:38 AM   Result Value Ref Range    WBC 18 11 (H) 4 31 - 10 16 Thousand/uL    RBC 3 62 (L) 3 88 - 5 62 Million/uL    Hemoglobin 12 6 12 0 - 17 0 g/dL    Hematocrit 38 0 36 5 - 49 3 %     (H) 82 - 98 fL    MCH 34 8 (H) 26 8 - 34 3 pg    MCHC 33 2 31 4 - 37 4 g/dL    RDW 14 3 11 6 - 15 1 %    MPV 9 5 8 9 - 12 7 fL    Platelets 708 (H) 007 - 390 Thousands/uL    nRBC 2 /100 WBCs    Neutrophils Relative 72 43 - 75 %    Immat GRANS % 4 (H) 0 - 2 %    Lymphocytes Relative 18 14 - 44 %    Monocytes Relative 6 4 - 12 %    Eosinophils Relative 0 0 - 6 %    Basophils Relative 0 0 - 1 %    Neutrophils Absolute 13 01 (H) 1 85 - 7 62 Thousands/µL    Immature Grans Absolute >0 50 (H) 0 00 - 0 20 Thousand/uL    Lymphocytes Absolute 3 29 0 60 - 4 47 Thousands/µL    Monocytes Absolute 1 07 0 17 - 1 22 Thousand/µL    Eosinophils Absolute 0 02 0 00 - 0 61 Thousand/µL    Basophils Absolute 0 07 0 00 - 0 10 Thousands/µL   Basic metabolic panel    Collection Time: 05/11/23  4:38 AM   Result Value Ref Range    Sodium 129 (L) 135 - 147 mmol/L    Potassium 4 3 3 5 - 5 3 mmol/L    Chloride 94 (L) 96 - 108 mmol/L    CO2 21 21 - 32 mmol/L    ANION GAP 14 (H) 4 - 13 mmol/L    BUN 36 (H) 5 - 25 mg/dL    Creatinine 1 71 (H) 0 60 - 1 30 mg/dL    Glucose 91 65 - 140 mg/dL    Calcium 9 7 8 4 - 10 2 mg/dL    eGFR 39 ml/min/1 73sq m   Magnesium    Collection Time: 05/11/23  4:38 AM   Result Value Ref Range    Magnesium 2 2 1 9 - 2 7 mg/dL   Cortisol    Collection Time: 05/11/23  4:38 AM   Result Value Ref Range    Cortisol, Random 4 8 ug/dL   TSH, 3rd generation    Collection Time: 05/11/23  4:38 AM   Result Value Ref Range    TSH 3RD GENERATON 1 950 0 450 - 4 500 uIU/mL   Uric acid    Collection Time: 05/11/23  4:38 AM   Result Value Ref Range    Uric Acid 3 2 (L) 3 5 - 8 5 mg/dL       CT abdomen pelvis wo contrast    Result Date: 4/12/2023  Narrative: CT ABDOMEN AND PELVIS WITHOUT IV CONTRAST INDICATION:   Diarrhea Bowel obstruction suspected Diarrhea for 2 weeks with distended abdomen    COMPARISON:  4/30/2022  TECHNIQUE:  CT examination of the abdomen and pelvis was performed without intravenous contrast  Multiplanar 2D reformatted images were created from the source data  Radiation dose length product (DLP) for this visit:  647 59 mGy-cm   This examination, like all CT scans performed in the [facility], was performed utilizing techniques to minimize radiation dose exposure, including the use of iterative  reconstruction and automated exposure control  Enteric contrast was not administered  FINDINGS: ABDOMEN LOWER CHEST:  Moderate coronary artery calcifications  Patchy dependent atelectasis in the right lower lobe  LIVER/BILIARY TREE:  Mild fatty infiltration  No biliary ductal dilatation  GALLBLADDER:  Status post cholecystectomy  SPLEEN:  Status post splenectomy  There are coils seen in the region of the splenic artery  PANCREAS:  Unremarkable  ADRENAL GLANDS:  Unremarkable  KIDNEYS/URETERS:  Unremarkable  No hydronephrosis  There is however a 2 0 x 1 8 cm nodule seen inferior to the right kidney in the right retroperitoneum  This was not seen on the prior CT study and therefore may reflect a metastatic deposit  STOMACH AND BOWEL:  Tiny hiatal hernia  No bowel obstruction  There is colonic diverticulosis  There is a focal area of colonic wall thickening and pericolonic inflammation at the junction of the descending colon and proximal sigmoid colon consistent with mild acute diverticulitis  APPENDIX:  A normal appendix was visualized  ABDOMINOPELVIC CAVITY:  No ascites  No pneumoperitoneum    No other lymphadenopathy  VESSELS:  The abdominal is calcified  No retroperitoneal hematoma  PELVIS REPRODUCTIVE ORGANS:  The prostate is mildly enlarged  URINARY BLADDER:  Unremarkable  ABDOMINAL WALL/INGUINAL REGIONS:  There are small fat-containing bilateral inguinal hernias right greater than left  There is a small fat-containing periumbilical hernia  OSSEOUS STRUCTURES:  No acute fracture or destructive osseous lesion  Mild compression deformities of several lower thoracic vertebral bodies appears stable  Advanced multilevel degenerative disc disease in the thoracal lumbar spine  Impression: Findings consistent with mild acute diverticulitis junction of the descending colon and proximal sigmoid colon  No bowel obstruction  Nodule in the right retroperitoneum, anterior to the right kidney, new since the prior study  This may reflect a metastatic deposit  Additional findings as above  Workstation performed: [id number]     XR chest 1 view portable    Result Date: 5/1/2023  Narrative: CHEST INDICATION:   cp/sob  COMPARISON: CT chest 4/22/2023 EXAM PERFORMED/VIEWS:  XR CHEST PORTABLE FINDINGS: Heart shadow appears unremarkable  Atherosclerotic vascular calcifications are noted  Patchy groundglass infiltrates bilaterally, worse from the prior exam  No pneumothorax or pleural effusion  Osseous structures appear within normal limits for patient age  Impression: Patchy groundglass infiltrates bilaterally, worse from the prior exam  This could be related to atypical pneumonia versus edema  Workstation performed: [id number]     XR chest 1 view portable    Result Date: 4/22/2023  Narrative: CHEST INDICATION:   SOB  COMPARISON:  CXR 4/11/2023 and chest CT 4/18/2023  EXAM PERFORMED/VIEWS:  XR CHEST PORTABLE  FINDINGS: Cardiomediastinal silhouette normal  Lungs clear  No effusion or pneumothorax  Upper abdomen normal  Embolization coils in the left upper quadrant  Bones normal for age       Impression: No acute "cardiopulmonary disease  Workstation performed: [id number]     XR chest 1 view portable    Result Date: 4/12/2023  Narrative: CHEST INDICATION:   weakness  COMPARISON:  7/30/2022 EXAM PERFORMED/VIEWS:  XR CHEST PORTABLE Single view FINDINGS: Low lung volumes accentuate markings at the bases Cardiomediastinal silhouette appears unremarkable  The lungs are clear  No pneumothorax or pleural effusion  Osseous structures appear within normal limits for patient age  Impression: Low lung volumes No acute cardiopulmonary disease  Workstation performed: [id number]     CT chest abdomen pelvis w contrast    Result Date: 4/22/2023  Narrative: CT CHEST, ABDOMEN AND PELVIS WITH IV CONTRAST INDICATION:   SOB, diarrhea/recent diverticulitis/assess for complication  \"78 yo M h/o HTN, PE/portal vein thrombosis on Pradaxa, metastatic melanoma (to brain/lung/retroperitoneum), presenting for evaluation of SOB and diarrhea  \"  \"Pt recently admitted for diarrhea/diverticulitis, discharged about 1 week ago, reports sx improved, unsure when they returned  States everything he eats causes immediate diarrhea and is covered in diarrhea on arrival  Denies dark/bloody stools  Reports he thinks he completed his oral abx  \" COMPARISON:  CTA chest PE study 4/18/2023  CT abdomen pelvis 4/11/2023  PET/CT 3/2/2020  TECHNIQUE: CT examination of the chest, abdomen and pelvis was performed  Multiplanar 2D reformatted images were created from the source data  Radiation dose length product (DLP) for this visit:  846 1 mGy-cm   This examination, like all CT scans performed in the [facility], was performed utilizing techniques to minimize radiation dose exposure, including the use of iterative reconstruction and automated exposure control  IV Contrast:  100 mL of iohexol (OMNIPAQUE) Enteric Contrast: Enteric contrast was administered  FINDINGS: CHEST LUNGS:  Field lungs no acute findings    Mild mosaic attenuation of the pulmonary " parenchyma keeping with a chronic small vessel or small airway disease  Lung nodule seen on the recent study are unchanged  No endotracheal or endobronchial lesion  PLEURA:  Unremarkable  HEART/GREAT VESSELS: Coronary artery calcifications  No thoracic aortic aneurysm  MEDIASTINUM AND BRENDA:  Unremarkable  CHEST WALL AND LOWER NECK:  Unremarkable  ABDOMEN LIVER/BILIARY TREE:  Multiple scattered enhancing lesions through the liver suspicious for metastatic disease; for example 2 4 cm segment 8 lesion #2/83 2 4 cm segment 7 lesion #2/70  GALLBLADDER:  Gallbladder is surgically absent  SPLEEN:  Status post splenectomy  Left upper quadrant vascular coils are present medial to the splenectomy bed  PANCREAS:  Unremarkable  ADRENAL GLANDS:  Unremarkable  KIDNEYS/URETERS:  Unremarkable  No hydronephrosis  STOMACH AND BOWEL:  Resolved descending colonic diverticulitis  APPENDIX:  Noninflamed  ABDOMINOPELVIC CAVITY:  No ascites  No pneumoperitoneum  Unchanged 2 1 cm right retroperitoneal nodule posterior to the left kidney, #2/138 there is also a 7 mm retroperitoneal nodule lateral to the right kidney #2/133  Unchanged right extrarenal iliac adenopathy; for example 17 mm right external iliac node #2/181 VESSELS:  Unremarkable for patient's age  PELVIS REPRODUCTIVE ORGANS:  Unremarkable for patient's age  URINARY BLADDER:  Unremarkable  ABDOMINAL WALL/INGUINAL REGIONS:  Bilateral fat-containing inguinal hernias larger on the right  Uncomplicated small fat-containing umbilical hernia  Unchanged right inguinal adenopathy  A dominant node measures 14 mm on #2/250  OSSEOUS STRUCTURES: Known osseous metastases seen on the recent PET/CT are not well visualized on this study  Impression: No acute findings in the chest, abdomen or pelvis  Resolved descending colonic diverticulitis without new bowel findings  Multiple subtle enhancing lesions throughout the liver in keeping with numerous hepatic metastases   Known pulmonary and retroperitoneal metastases  Unchanged right iliac chain and inguinal adenopathy in this patient with metastatic melanoma  Known osseous metastases are not well visualized on this study    The study was marked in EPIC for immediate notification  Workstation performed: [id number]     PE Study with CT Abdomen and Pelvis with contrast    Result Date: 4/30/2023  Narrative: CT PULMONARY ANGIOGRAM OF THE CHEST AND CT ABDOMEN AND PELVIS WITH INTRAVENOUS CONTRAST INDICATION:   hypoxia, active cancer, hx PE, abd pain and diarrhea recent diverticulitis  Metastatic melanoma with osseous metastases COMPARISON: CT 4/22/2023 and priors TECHNIQUE:  CT examination of the chest, abdomen and pelvis was performed  Thin section CT angiographic technique was used in the chest in order to evaluate for pulmonary embolus and coronal 3D MIP postprocessing was performed on the acquisition scanner  Multiplanar 2D reformatted images were created from the source data  Radiation dose length product (DLP) for this visit:  870 mGy-cm   This examination, like all CT scans performed in the [facility], was performed utilizing techniques to minimize radiation dose exposure, including the use of iterative reconstruction and automated exposure control  IV Contrast:  100 mL of iohexol (OMNIPAQUE) Enteric Contrast:  Enteric contrast was not administered  FINDINGS: CHEST PULMONARY ARTERIAL TREE:  No pulmonary embolus is seen  LUNGS: New bilateral lung groundglass opacities and infiltrates, likely inflammatory/infectious  Scattered small lung nodules again visualized, suspicious for metastases  Example 5 mm right lower lung nodule image 6/62  3 mm left upper lung nodule image 6/37  There is no tracheal or endobronchial lesion  PLEURA:  Unremarkable  HEART/AORTA: Coronary artery calcifications  No thoracic aortic aneurysm  MEDIASTINUM AND BRENDA:  Unremarkable  CHEST WALL AND LOWER NECK:  Unremarkable   ABDOMEN LIVER/BILIARY TREE: Scattered liver lesions again visualized, most concerning for metastases  Hepatic steatosis  GALLBLADDER: Gallbladder is surgically absent  SPLEEN: Splenectomy  PANCREAS:  Unremarkable  ADRENAL GLANDS:  Unremarkable  KIDNEYS/URETERS:  Unremarkable  No hydronephrosis  STOMACH AND BOWEL: There is colonic diverticulosis without evidence of acute diverticulitis  APPENDIX:  No findings to suggest appendicitis  ABDOMINOPELVIC CAVITY:  No ascites  No pneumoperitoneum  2 1 x 2 cm metastatic nodule lateral to the right psoas muscle image 2/303  This measured 1 7 x 1 5 cm on prior PET/CT  Small nodule lateral to the lower pole of the right kidney image 2/299, inferior to the right renal pole image 2/303, and lateral to the mid right kidney image 2/273  Metastatic adenopathy in the right pelvis posterior to the internal iliac vessels, as well as right inguinal region, appears stable  Small nodule anterior to the right colon measuring 1 x 1 cm, image 2/293  1 2 x 0 9 cm nodule in the right lower quadrant image 2/325  Additional scattered tiny intra-abdominal nodules are present  VESSELS:  Unremarkable for patient's age  Artifact from metallic vascular coils in the left upper quadrant  PELVIS REPRODUCTIVE ORGANS:  Unremarkable for patient's age  URINARY BLADDER:  Unremarkable  ABDOMINAL WALL/INGUINAL REGIONS: Right inguinal hernia containing fat and bowel  Small fat-containing left inguinal hernia  Fat-containing umbilical hernia  OSSEOUS STRUCTURES:  No acute fracture  Chronic loss of height in the lower thoracic vertebral bodies  Spine degenerative change  Known osseous metastases are better demonstrated on prior PET/CT  Impression: 1  No pulmonary artery emboli  2   New bilateral lung groundglass opacities and infiltrates, likely inflammatory/infectious  3  Scattered small lung nodules again visualized, suspicious for metastases  4  Scattered liver lesions again visualized, most concerning for metastases   5  Multiple intra-abdominal and right inguinal nodules, likely metastatic  6  Known osseous metastases better demonstrated on prior PET/CT  Workstation performed: [id number]     CTA chest pe study    Result Date: 4/18/2023  Narrative: CTA - CHEST WITH IV CONTRAST - PULMONARY ANGIOGRAM INDICATION:   I26 09: Other pulmonary embolism with acute cor pulmonale R06 02: Shortness of breath  COMPARISON: CTA chest PE study 7/31/2022  TECHNIQUE: CTA examination of the chest was performed using angiographic technique according to a protocol specifically tailored to evaluate for pulmonary embolism  Multiplanar 2D reformatted images were created from the source data  In addition, coronal 3D MIP postprocessing was performed on the acquisition scanner  Radiation dose length product (DLP) for this visit:  417 mGy-cm   This examination, like all CT scans performed in the [facility], was performed utilizing techniques to minimize radiation dose exposure, including the use of iterative reconstruction and automated exposure control  IV Contrast:  85 mL of iohexol (OMNIPAQUE)  FINDINGS: PULMONARY ARTERIAL TREE:  No pulmonary embolus is seen  LUNGS:  New 8 x 5 mm right middle lobe nodule #3/82  New 4 mm left upper lobe nodule #3/76  New 4 mm left upper lobe subpleural nodule #3/80  New 5 mm right lower lobe nodule #3/115  New 6 mm right lower lobe nodule #3/105  No endotracheal or endobronchial lesion  Previously described subtle scattered groundglass opacities/mosaic perfusion are slightly less apparent  These are likely to represent a chronic small vessel or small airway disease  PLEURA:  Unremarkable  HEART/GREAT VESSELS:  Unremarkable for patient's age  No thoracic aortic aneurysm  MEDIASTINUM AND BRENDA:  Unremarkable  CHEST WALL AND LOWER NECK:   Unremarkable  VISUALIZED STRUCTURES IN THE UPPER ABDOMEN:  Left upper quadrant vascular coils  Reidentified hepatic steatosis   OSSEOUS STRUCTURES:  No acute fracture or destructive osseous lesion  Impression: No pulmonary arterial embolism or pulmonary infiltrate/consolidation  New scattered pulmonary nodules suspicious for metastases  The study was marked in [facility] for immediate notification  Workstation performed: [id number]     Echo complete w/ contrast if indicated    Result Date: 4/23/2023  Narrative: •  Left Ventricle: Left ventricular cavity size is normal  Wall thickness is normal  The left ventricular ejection fraction is 65%  Systolic function is normal  Wall motion is normal  Diastolic function is normal        Labs and pertinent reports reviewed  This note has been generated by voice recognition software system  Therefore, there may be spelling, grammar, and or syntax errors  Please contact if questions arise

## 2023-05-11 NOTE — PLAN OF CARE
Problem: OCCUPATIONAL THERAPY ADULT  Goal: Performs self-care activities at highest level of function for planned discharge setting  See evaluation for individualized goals  Description: Treatment Interventions: ADL retraining, Functional transfer training, UE strengthening/ROM, Endurance training, Patient/family training, Equipment evaluation/education, Neuromuscular reeducation, Compensatory technique education, Energy conservation, Activityengagement          See flowsheet documentation for full assessment, interventions and recommendations  Outcome: Progressing  Note: Limitation: Decreased ADL status, Decreased UE strength, Decreased Safe judgement during ADL, Decreased endurance, Decreased self-care trans  Prognosis: Good  Assessment: Pt seen for skilled OT session focused on ADLs, functional transfers and mobility  Pt w/ improvement in alertness this session and motivated to get better and back to routine  Pt w/ MOD assist x2 supine>sit bed mobility and initially w/ posterior lean requiring support and then improved  Pt w/ MOD assist LB Dressing 2* impaired balance and functional reach (see above for further assist)  Pt w/ MOD assist x2 sit>stand w/ VCs for hand placement and positioning  Pt w/ MOD assist x2 functional mobility w/ RW and posterior lean noted and cues for sequencing  Pt w/ MIN assist x 2 stand>sit w/ VCs for hand placement  Pt continues to bed limited due to impaired balance, impaired activity tolerance, dyspnea on exertion (SPO2 on room air 89-96% w/ cues for pursed lip breathing and frequent rest breaks needed), Tremors of b/l hands, impaired functional reach, decreased endurance, decreased activity tolerance, fall risk, decreased strength all causing a decline in ADLs, functional transfers and mobility  Recommend STR when medically stable  Will continue to follow  The patient's raw score on the AM-PAC Daily Activity Inpatient Short Form is 15   A raw score of less than 19 suggests the patient may benefit from discharge to post-acute rehabilitation services  Please refer to the recommendation of the Occupational Therapist for safe discharge planning       OT Discharge Recommendation: Post acute rehabilitation services

## 2023-05-11 NOTE — ASSESSMENT & PLAN NOTE
Possibly due to hydrochlorothiazide, bactrim  · Due to uptrending levels will have renal evaluate him  · Urinary retention protocol  · US kidney and bladder    Recent Labs     05/09/23  0604 05/10/23  0458 05/11/23  0438   BUN 26* 32* 36*   CREATININE 1 53* 1 62* 1 71*   EGFR 45 42 39       Results from last 7 days   Lab Units 05/09/23  1630   SODIUM UR  86

## 2023-05-12 NOTE — ASSESSMENT & PLAN NOTE
58-year-old male presenting with cough, chest tightness, shortness of breath, and generalized weakness likely secondary to Pneomocystis Carinii Pneumonia (Diagnosed through bronchoscopy, BAL positive for this)  • Improved  Currently on room air  • Continue antibiotics per PCP as per ID recommendations

## 2023-05-12 NOTE — PROGRESS NOTES
Progress Note - Infectious Disease   Caryle Balo 70 y o  male MRN: [id number]  Unit/Bed#: [facility] 68 2 -01 Encounter: [id number]      Impression/Plan:    1  Pneumocystis pneumonia:  - Patient with new hypoxia and CXR / CT chest show new bilateral GGO concerning for PJP in immunosuppressed patient on chronic steroids  PJP DFA from BAL positive from multiple samples  Additional infectious workup negative including RP2 negative, COVID19, BAL cultures, BAL Histoplasma antigen, BAL Aspergillus antigen  Patient lived in [place] for 15 years, thus consider possibility of TB, though no other risk factors and unlikely given three BAL AFB negative smears  He is now clinically improving and stable on room air since starting Bactrim  - continue PO Bactrim 2 DS tabs every 8 hours   - monitor CrCl and potassium; current dose ok, Cr now coming down   - plan for 21 day course total, through 5/24  - will also need 21 day course of adjunctive steroids; he should be on the equivalent of Prednisone 40 mg daily through 5/13 and then on Prednisone 20 mg daily through 5/24; current dose of Decadron is adequate  -following treatment, would benefit from secondary prophylaxis while on lifelong steroids   -will need outpatient ID follow up upon discharge; will notify office when closer to d/c  - monitor CBC + diff and BMP weekly while on therapy after DC    2  Active Hallucinations:  - Patient reports started about 3 weeks ago but are getting worse  He often sees his dog in the room and sometimes thinks he is at home  Suspect less likely from Bactrim given symptoms started before the antibiotic  Consider side effect of his steroids for which he was on at home as well  - primary team discussing with patient's Hematologist regarding possible decreasing steroid dose  - Bactrim dose decreased as well on 5/10  - monitor for improvement    3  Acute respiratory failure with hypoxia: Resolved  - This is presumed secondary to #1   Patient is now improving and weaned off oxygen since starting treatment as above  - antibiotics and steroids for PJP as above  - follow up TB PCR from BAL, though clinical concern low  -close pulmonology follow up      4  ARABELLA: Estimated Creatinine Clearance: 48 4 mL/min (A) (by C-G formula based on SCr of 1 55 mg/dL (H))  - developed after starting Bactrim  Suspect this is largely medication side effect  Nephrology following   - daily BMP for now  - dose of Bactrim as above     5  Hx of malignant melanoma, mets to brain, lung, liver, LN, bone  S/p whole brain radiation  Briefly started on treatment with encorafenib/binimetinib and was supposed to restart on 4/3 but now on hold due to recent acute illness  Follows with outpatient heme/onc, Dr Miranda    -close onc follow up      6  Chronic diarrhea: Known hx of C diff in the past  Recent admission for diverticulitis  CT A/P negative  C  Diff PCR positive with negative EIA  Likely colonized  -monitor closely for development of diarrhea     7  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, 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  Follows outpatient heme/onc  Previously treated with Rituxan and recently restarted on dexamethasone for #3  Follows Dr Kelli Rushing  -monitor CBC  -as above, discussing steroids dose with Heme     8  Hx PE on Pradaxa  CTA chest negative for VTE  Plan and recommendations were discussed with primary team     Antibiotics:  [id number]-SMX    Subjective:  Patient remains afebrile and on room air  WBC stagnant at 18, though remains on chronic decadron  Serum Cr down trended to 1 5 this AM     Today denies fever, chills, diarrhea, shortness of breath  Hallucinations improving      Objective:  Vitals:  Temp:  [97 9 °F (36 6 °C)] 97 9 °F (36 6 °C)  HR:  [72-77] 72  Resp:  [18-20] 20  BP: (132-134)/(79-80) 132/79  SpO2:  [90 %-93 %] 93 %  Temp (24hrs), Av 9 °F (36 6 °C), Min:97 9 °F (36 6 °C), Max:97 9 °F (36 6 °C)  Current: Temperature: 97 9 °F (36 6 °C)    Physical Exam: General Appearance:  Alert, interactive, nontoxic, no acute distress  Throat: Oropharynx moist without lesions  Lungs:    respirations unlabored   Heart:  RRR; no murmur   Abdomen:   Soft, non-tender      Extremities: No clubbing, cyanosis or edema   Skin: No new rashes or lesions  No draining wounds noted  Labs:    All pertinent labs and imaging studies were personally reviewed  Results from last 7 days   Lab Units 05/11/23  0438 05/10/23  0458 05/09/23  0604   WBC Thousand/uL 18 11* 18 10* 18 95*   HEMOGLOBIN g/dL 12 6 11 0* 11 5*   PLATELETS Thousands/uL 688* 609* 547*     Results from last 7 days   Lab Units 05/12/23  0504 05/11/23  0438 05/10/23  0458   SODIUM mmol/L 126* 129* 125*   POTASSIUM mmol/L 5 0 4 3 4 5   CHLORIDE mmol/L 99 94* 94*   CO2 mmol/L 16* 21 21   BUN mg/dL 36* 36* 32*   CREATININE mg/dL 1 55* 1 71* 1 62*   EGFR ml/min/1 73sq m 45 39 42   CALCIUM mg/dL 8 7 9 7 9 1             Results from last 7 days   Lab Units 05/06/23  0540   FERRITIN ng/mL 2,579*           Micro:        Imaging:          Norma Barnett MD  [facility]

## 2023-05-12 NOTE — PLAN OF CARE
Problem: Potential for Falls  Goal: Patient will remain free of falls  Description: INTERVENTIONS:  - Educate patient/family on patient safety including physical limitations  - Instruct patient to call for assistance with activity   - Consult OT/PT to assist with strengthening/mobility   - Keep Call bell within reach  - Keep bed low and locked with side rails adjusted as appropriate  - Keep care items and personal belongings within reach  - Initiate and maintain comfort rounds  - Make Fall Risk Sign visible to staff  - Offer Toileting   - Initiate/Maintain alarm  - Obtain necessary fall risk management equipment  - Apply yellow socks and bracelet for high fall risk patients  - Consider moving patient to room near nurses station  Outcome: Progressing     Problem: MOBILITY - ADULT  Goal: Maintain or return to baseline ADL function  Description: INTERVENTIONS:  -  Assess patient's ability to carry out ADLs; assess patient's baseline for ADL function and identify physical deficits which impact ability to perform ADLs (bathing, care of mouth/teeth, toileting, grooming, dressing, etc )  - Assess/evaluate cause of self-care deficits   - Assess range of motion  - Assess patient's mobility; develop plan if impaired  - Assess patient's need for assistive devices and provide as appropriate  - Encourage maximum independence but intervene and supervise when necessary  - Involve family in performance of ADLs  - Assess for home care needs following discharge   - Consider OT consult to assist with ADL evaluation and planning for discharge  - Provide patient education as appropriate  Outcome: Progressing  Goal: Maintains/Returns to pre admission functional level  Description: INTERVENTIONS:  - Perform BMAT or MOVE assessment daily    - Set and communicate daily mobility goal to care team and patient/family/caregiver     - Collaborate with rehabilitation services on mobility goals if consulted  - Perform Range of Motion  - Reposition patient   - Dangle patient   - Stand patient   - Ambulate patient   - Out of bed to chair   - Out of bed for meals   - Out of bed for toileting  - Record patient progress and toleration of activity level   Outcome: Progressing     Problem: INFECTION - ADULT  Goal: Absence or prevention of progression during hospitalization  Description: INTERVENTIONS:  - Assess and monitor for signs and symptoms of infection  - Monitor lab/diagnostic results  - Monitor all insertion sites, i e  indwelling lines, tubes, and drains  - Monitor endotracheal if appropriate and nasal secretions for changes in amount and color  - [place] appropriate cooling/warming therapies per order  - Administer medications as ordered  - Instruct and encourage patient and family to use good hand hygiene technique  - Identify and instruct in appropriate isolation precautions for identified infection/condition  Outcome: Progressing  Goal: Absence of fever/infection during neutropenic period  Description: INTERVENTIONS:  - Monitor WBC    Outcome: Progressing     Problem: SAFETY ADULT  Goal: Patient will remain free of falls  Description: INTERVENTIONS:  - Educate patient/family on patient safety including physical limitations  - Instruct patient to call for assistance with activity   - Consult OT/PT to assist with strengthening/mobility   - Keep Call bell within reach  - Keep bed low and locked with side rails adjusted as appropriate  - Keep care items and personal belongings within reach  - Initiate and maintain comfort rounds  - Make Fall Risk Sign visible to staff  - Offer Toileting  - Initiate/Maintain alarm  - Obtain necessary fall risk management equipment  - Apply yellow socks and bracelet for high fall risk patients  - Consider moving patient to room near nurses station  Outcome: Progressing  Goal: Maintain or return to baseline ADL function  Description: INTERVENTIONS:  -  Assess patient's ability to carry out ADLs; assess patient's baseline for ADL function and identify physical deficits which impact ability to perform ADLs (bathing, care of mouth/teeth, toileting, grooming, dressing, etc )  - Assess/evaluate cause of self-care deficits   - Assess range of motion  - Assess patient's mobility; develop plan if impaired  - Assess patient's need for assistive devices and provide as appropriate  - Encourage maximum independence but intervene and supervise when necessary  - Involve family in performance of ADLs  - Assess for home care needs following discharge   - Consider OT consult to assist with ADL evaluation and planning for discharge  - Provide patient education as appropriate  Outcome: Progressing  Goal: Maintains/Returns to pre admission functional level  Description: INTERVENTIONS:  - Perform BMAT or MOVE assessment daily    - Set and communicate daily mobility goal to care team and patient/family/caregiver     - Collaborate with rehabilitation services on mobility goals if consulted  - Perform Range of Motion   - Reposition patient   - Dangle patient   - Stand patient   - Ambulate patient  - Out of bed to chair   - Out of bed for meals   - Out of bed for toileting  - Record patient progress and toleration of activity level   Outcome: Progressing     Problem: CARDIOVASCULAR - ADULT  Goal: Maintains optimal cardiac output and hemodynamic stability  Description: INTERVENTIONS:  - Monitor I/O, vital signs and rhythm  - Monitor for S/S and trends of decreased cardiac output  - Administer and titrate ordered vasoactive medications to optimize hemodynamic stability  - Assess quality of pulses, skin color and temperature  - Assess for signs of decreased coronary artery perfusion  - Instruct patient to report change in severity of symptoms  Outcome: Progressing  Goal: Absence of cardiac dysrhythmias or at baseline rhythm  Description: INTERVENTIONS:  - Continuous cardiac monitoring, vital signs, obtain 12 lead EKG if ordered  - Administer antiarrhythmic and heart rate control medications as ordered  - Monitor electrolytes and administer replacement therapy as ordered  Outcome: Progressing     Problem: RESPIRATORY - ADULT  Goal: Achieves optimal ventilation and oxygenation  Description: INTERVENTIONS:  - Assess for changes in respiratory status  - Assess for changes in mentation and behavior  - Position to facilitate oxygenation and minimize respiratory effort  - Oxygen administered by appropriate delivery if ordered  - Initiate smoking cessation education as indicated  - Encourage broncho-pulmonary hygiene including cough, deep breathe, Incentive Spirometry  - Assess the need for suctioning and aspirate as needed  - Assess and instruct to report SOB or any respiratory difficulty  - Respiratory Therapy support as indicated  Outcome: Progressing     Problem: GASTROINTESTINAL - ADULT  Goal: Minimal or absence of nausea and/or vomiting  Description: INTERVENTIONS:  - Administer IV fluids if ordered to ensure adequate hydration  - Maintain NPO status until nausea and vomiting are resolved  - Nasogastric tube if ordered  - Administer ordered antiemetic medications as needed  - Provide nonpharmacologic comfort measures as appropriate  - Advance diet as tolerated, if ordered  - Consider nutrition services referral to assist patient with adequate nutrition and appropriate food choices  Outcome: Progressing  Goal: Maintains or returns to baseline bowel function  Description: INTERVENTIONS:  - Assess bowel function  - Encourage oral fluids to ensure adequate hydration  - Administer IV fluids if ordered to ensure adequate hydration  - Administer ordered medications as needed  - Encourage mobilization and activity  - Consider nutritional services referral to assist patient with adequate nutrition and appropriate food choices  Outcome: Progressing  Goal: Maintains adequate nutritional intake  Description: INTERVENTIONS:  - Monitor percentage of each meal consumed  - Identify factors contributing to decreased intake, treat as appropriate  - Assist with meals as needed  - Monitor I&O, weight, and lab values if indicated  - Obtain nutrition services referral as needed  Outcome: Progressing  Goal: Establish and maintain optimal ostomy function  Description: INTERVENTIONS:  - Assess bowel function  - Encourage oral fluids to ensure adequate hydration  - Administer IV fluids if ordered to ensure adequate hydration   - Administer ordered medications as needed  - Encourage mobilization and activity  - Nutrition services referral to assist patient with appropriate food choices  - Assess stoma site  - Consider wound care consult   Outcome: Progressing  Goal: Oral mucous membranes remain intact  Description: INTERVENTIONS  - Assess oral mucosa and hygiene practices  - Implement preventative oral hygiene regimen  - Implement oral medicated treatments as ordered  - Initiate Nutrition services referral as needed  Outcome: Progressing     Problem: METABOLIC, FLUID AND ELECTROLYTES - ADULT  Goal: Electrolytes maintained within normal limits  Description: INTERVENTIONS:  - Monitor labs and assess patient for signs and symptoms of electrolyte imbalances  - Administer electrolyte replacement as ordered  - Monitor response to electrolyte replacements, including repeat lab results as appropriate  - Instruct patient on fluid and nutrition as appropriate  Outcome: Progressing  Goal: Fluid balance maintained  Description: INTERVENTIONS:  - Monitor labs   - Monitor I/O and WT  - Instruct patient on fluid and nutrition as appropriate  - Assess for signs & symptoms of volume excess or deficit  Outcome: Progressing  Goal: Glucose maintained within target range  Description: INTERVENTIONS:  - Monitor Blood Glucose as ordered  - Assess for signs and symptoms of hyperglycemia and hypoglycemia  - Administer ordered medications to maintain glucose within target range  - Assess nutritional intake and initiate nutrition service referral as needed  Outcome: Progressing     Problem: Prexisting or High Potential for Compromised Skin Integrity  Goal: Skin integrity is maintained or improved  Description: INTERVENTIONS:  - Identify patients at risk for skin breakdown  - Assess and monitor skin integrity  - Assess and monitor nutrition and hydration status  - Monitor labs   - Assess for incontinence   - Turn and reposition patient  - Assist with mobility/ambulation  - Relieve pressure over bony prominences  - Avoid friction and shearing  - Provide appropriate hygiene as needed including keeping skin clean and dry  - Evaluate need for skin moisturizer/barrier cream  - Collaborate with interdisciplinary team   - Patient/family teaching  - Consider wound care consult   Outcome: Progressing     Problem: Nutrition/Hydration-ADULT  Goal: Nutrient/Hydration intake appropriate for improving, restoring or maintaining nutritional needs  Description: Monitor and assess patient's nutrition/hydration status for malnutrition  Collaborate with interdisciplinary team and initiate plan and interventions as ordered  Monitor patient's weight and dietary intake as ordered or per policy  Utilize nutrition screening tool and intervene as necessary  Determine patient's food preferences and provide high-protein, high-caloric foods as appropriate       INTERVENTIONS:  - Monitor oral intake, urinary output, labs, and treatment plans  - Assess nutrition and hydration status and recommend course of action  - Evaluate amount of meals eaten  - Assist patient with eating if necessary   - Allow adequate time for meals  - Recommend/ encourage appropriate diets, oral nutritional supplements, and vitamin/mineral supplements  - Order, calculate, and assess calorie counts as needed  - Recommend, monitor, and adjust tube feedings and TPN/PPN based on assessed needs  - Assess need for intravenous fluids  - Provide specific nutrition/hydration education as appropriate  - Include patient/family/caregiver in decisions related to nutrition  Outcome: Progressing

## 2023-05-12 NOTE — PLAN OF CARE
Problem: Potential for Falls  Goal: Patient will remain free of falls  Description: INTERVENTIONS:  - Educate patient/family on patient safety including physical limitations  - Instruct patient to call for assistance with activity   - Consult OT/PT to assist with strengthening/mobility   - Keep Call bell within reach  - Keep bed low and locked with side rails adjusted as appropriate  - Keep care items and personal belongings within reach  - Initiate and maintain comfort rounds  - Make Fall Risk Sign visible to staff  - Offer Toileting every 2 Hours, in advance of need  - Initiate/Maintain bed alarm  - Obtain necessary fall risk management equipment: socks  - Apply yellow socks and bracelet for high fall risk patients  - Consider moving patient to room near nurses station  Outcome: Progressing     Problem: MOBILITY - ADULT  Goal: Maintain or return to baseline ADL function  Description: INTERVENTIONS:  -  Assess patient's ability to carry out ADLs; assess patient's baseline for ADL function and identify physical deficits which impact ability to perform ADLs (bathing, care of mouth/teeth, toileting, grooming, dressing, etc )  - Assess/evaluate cause of self-care deficits   - Assess range of motion  - Assess patient's mobility; develop plan if impaired  - Assess patient's need for assistive devices and provide as appropriate  - Encourage maximum independence but intervene and supervise when necessary  - Involve family in performance of ADLs  - Assess for home care needs following discharge   - Consider OT consult to assist with ADL evaluation and planning for discharge  - Provide patient education as appropriate  Outcome: Progressing  Goal: Maintains/Returns to pre admission functional level  Description: INTERVENTIONS:  - Perform BMAT or MOVE assessment daily    - Set and communicate daily mobility goal to care team and patient/family/caregiver     - Collaborate with rehabilitation services on mobility goals if consulted  - Perform Range of Motion 3 times a day  - Reposition patient every 2 hours    - Dangle patient 3 times a day  - Stand patient 3 times a day  - Ambulate patient 3 times a day  - Out of bed to chair 3 times a day   - Out of bed for meals 3 times a day  - Out of bed for toileting  - Record patient progress and toleration of activity level   Outcome: Progressing     Problem: INFECTION - ADULT  Goal: Absence or prevention of progression during hospitalization  Description: INTERVENTIONS:  - Assess and monitor for signs and symptoms of infection  - Monitor lab/diagnostic results  - Monitor all insertion sites, i e  indwelling lines, tubes, and drains  - Monitor endotracheal if appropriate and nasal secretions for changes in amount and color  - [place] appropriate cooling/warming therapies per order  - Administer medications as ordered  - Instruct and encourage patient and family to use good hand hygiene technique  - Identify and instruct in appropriate isolation precautions for identified infection/condition  Outcome: Progressing  Goal: Absence of fever/infection during neutropenic period  Description: INTERVENTIONS:  - Monitor WBC    Outcome: Progressing     Problem: SAFETY ADULT  Goal: Patient will remain free of falls  Description: INTERVENTIONS:  - Educate patient/family on patient safety including physical limitations  - Instruct patient to call for assistance with activity   - Consult OT/PT to assist with strengthening/mobility   - Keep Call bell within reach  - Keep bed low and locked with side rails adjusted as appropriate  - Keep care items and personal belongings within reach  - Initiate and maintain comfort rounds  - Make Fall Risk Sign visible to staff  - Offer Toileting every 2 Hours, in advance of need  - Initiate/Maintain bed alarm  - Obtain necessary fall risk management equipment: socks  - Apply yellow socks and bracelet for high fall risk patients  - Consider moving patient to room near nurses station  Outcome: Progressing  Goal: Maintain or return to baseline ADL function  Description: INTERVENTIONS:  -  Assess patient's ability to carry out ADLs; assess patient's baseline for ADL function and identify physical deficits which impact ability to perform ADLs (bathing, care of mouth/teeth, toileting, grooming, dressing, etc )  - Assess/evaluate cause of self-care deficits   - Assess range of motion  - Assess patient's mobility; develop plan if impaired  - Assess patient's need for assistive devices and provide as appropriate  - Encourage maximum independence but intervene and supervise when necessary  - Involve family in performance of ADLs  - Assess for home care needs following discharge   - Consider OT consult to assist with ADL evaluation and planning for discharge  - Provide patient education as appropriate  Outcome: Progressing  Goal: Maintains/Returns to pre admission functional level  Description: INTERVENTIONS:  - Perform BMAT or MOVE assessment daily    - Set and communicate daily mobility goal to care team and patient/family/caregiver  - Collaborate with rehabilitation services on mobility goals if consulted  - Perform Range of Motion 3 times a day  - Reposition patient every 2 hours    - Dangle patient 3 times a day  - Stand patient 3 times a day  - Ambulate patient 3 times a day  - Out of bed to chair 3 times a day   - Out of bed for meals 3 times a day  - Out of bed for toileting  - Record patient progress and toleration of activity level   Outcome: Progressing     Problem: CARDIOVASCULAR - ADULT  Goal: Maintains optimal cardiac output and hemodynamic stability  Description: INTERVENTIONS:  - Monitor I/O, vital signs and rhythm  - Monitor for S/S and trends of decreased cardiac output  - Administer and titrate ordered vasoactive medications to optimize hemodynamic stability  - Assess quality of pulses, skin color and temperature  - Assess for signs of decreased coronary artery perfusion  - Instruct patient to report change in severity of symptoms  Outcome: Progressing  Goal: Absence of cardiac dysrhythmias or at baseline rhythm  Description: INTERVENTIONS:  - Continuous cardiac monitoring, vital signs, obtain 12 lead EKG if ordered  - Administer antiarrhythmic and heart rate control medications as ordered  - Monitor electrolytes and administer replacement therapy as ordered  Outcome: Progressing     Problem: RESPIRATORY - ADULT  Goal: Achieves optimal ventilation and oxygenation  Description: INTERVENTIONS:  - Assess for changes in respiratory status  - Assess for changes in mentation and behavior  - Position to facilitate oxygenation and minimize respiratory effort  - Oxygen administered by appropriate delivery if ordered  - Initiate smoking cessation education as indicated  - Encourage broncho-pulmonary hygiene including cough, deep breathe, Incentive Spirometry  - Assess the need for suctioning and aspirate as needed  - Assess and instruct to report SOB or any respiratory difficulty  - Respiratory Therapy support as indicated  Outcome: Progressing     Problem: GASTROINTESTINAL - ADULT  Goal: Minimal or absence of nausea and/or vomiting  Description: INTERVENTIONS:  - Administer IV fluids if ordered to ensure adequate hydration  - Maintain NPO status until nausea and vomiting are resolved  - Nasogastric tube if ordered  - Administer ordered antiemetic medications as needed  - Provide nonpharmacologic comfort measures as appropriate  - Advance diet as tolerated, if ordered  - Consider nutrition services referral to assist patient with adequate nutrition and appropriate food choices  Outcome: Progressing  Goal: Maintains or returns to baseline bowel function  Description: INTERVENTIONS:  - Assess bowel function  - Encourage oral fluids to ensure adequate hydration  - Administer IV fluids if ordered to ensure adequate hydration  - Administer ordered medications as needed  - Encourage mobilization and activity  - Consider nutritional services referral to assist patient with adequate nutrition and appropriate food choices  Outcome: Progressing  Goal: Maintains adequate nutritional intake  Description: INTERVENTIONS:  - Monitor percentage of each meal consumed  - Identify factors contributing to decreased intake, treat as appropriate  - Assist with meals as needed  - Monitor I&O, weight, and lab values if indicated  - Obtain nutrition services referral as needed  Outcome: Progressing  Goal: Establish and maintain optimal ostomy function  Description: INTERVENTIONS:  - Assess bowel function  - Encourage oral fluids to ensure adequate hydration  - Administer IV fluids if ordered to ensure adequate hydration   - Administer ordered medications as needed  - Encourage mobilization and activity  - Nutrition services referral to assist patient with appropriate food choices  - Assess stoma site  - Consider wound care consult   Outcome: Progressing  Goal: Oral mucous membranes remain intact  Description: INTERVENTIONS  - Assess oral mucosa and hygiene practices  - Implement preventative oral hygiene regimen  - Implement oral medicated treatments as ordered  - Initiate Nutrition services referral as needed  Outcome: Progressing     Problem: METABOLIC, FLUID AND ELECTROLYTES - ADULT  Goal: Electrolytes maintained within normal limits  Description: INTERVENTIONS:  - Monitor labs and assess patient for signs and symptoms of electrolyte imbalances  - Administer electrolyte replacement as ordered  - Monitor response to electrolyte replacements, including repeat lab results as appropriate  - Instruct patient on fluid and nutrition as appropriate  Outcome: Progressing  Goal: Fluid balance maintained  Description: INTERVENTIONS:  - Monitor labs   - Monitor I/O and WT  - Instruct patient on fluid and nutrition as appropriate  - Assess for signs & symptoms of volume excess or deficit  Outcome: Progressing  Goal: Glucose maintained within target range  Description: INTERVENTIONS:  - Monitor Blood Glucose as ordered  - Assess for signs and symptoms of hyperglycemia and hypoglycemia  - Administer ordered medications to maintain glucose within target range  - Assess nutritional intake and initiate nutrition service referral as needed  Outcome: Progressing     Problem: Prexisting or High Potential for Compromised Skin Integrity  Goal: Skin integrity is maintained or improved  Description: INTERVENTIONS:  - Identify patients at risk for skin breakdown  - Assess and monitor skin integrity  - Assess and monitor nutrition and hydration status  - Monitor labs   - Assess for incontinence   - Turn and reposition patient  - Assist with mobility/ambulation  - Relieve pressure over bony prominences  - Avoid friction and shearing  - Provide appropriate hygiene as needed including keeping skin clean and dry  - Evaluate need for skin moisturizer/barrier cream  - Collaborate with interdisciplinary team   - Patient/family teaching  - Consider wound care consult   Outcome: Progressing     Problem: Nutrition/Hydration-ADULT  Goal: Nutrient/Hydration intake appropriate for improving, restoring or maintaining nutritional needs  Description: Monitor and assess patient's nutrition/hydration status for malnutrition  Collaborate with interdisciplinary team and initiate plan and interventions as ordered  Monitor patient's weight and dietary intake as ordered or per policy  Utilize nutrition screening tool and intervene as necessary  Determine patient's food preferences and provide high-protein, high-caloric foods as appropriate       INTERVENTIONS:  - Monitor oral intake, urinary output, labs, and treatment plans  - Assess nutrition and hydration status and recommend course of action  - Evaluate amount of meals eaten  - Assist patient with eating if necessary   - Allow adequate time for meals  - Recommend/ encourage appropriate diets, oral nutritional supplements, and vitamin/mineral supplements  - Order, calculate, and assess calorie counts as needed  - Recommend, monitor, and adjust tube feedings and TPN/PPN based on assessed needs  - Assess need for intravenous fluids  - Provide specific nutrition/hydration education as appropriate  - Include patient/family/caregiver in decisions related to nutrition  Outcome: Progressing

## 2023-05-12 NOTE — CONSULTS
PHYSICAL MEDICINE AND REHABILITATION CONSULT NOTE  Cheryl Douglas 70 y o  male MRN: [id number]  Unit/Bed#: [place] [address] -01 Encounter: [id number]    Requested by (Physician/Service): Donovan Arizmendi MD  Reason for Consultation:  Assessment of rehabilitation needs    Assessment:  Rehabilitation Diagnosis:   • Debility due to pneumocystis pneumonia   • Metastatic melanoma   • Delirium   • Impaired mobility and self care    Recommendations:  Rehabilitation Plan:  • Continue PT/OT (SLP) while on acute care  • The patient may be a candidate for acute inpatient rehabilitation once medically stable  Patient remains hyponatremic and MRI of the brain is pending  • Covid-19 Testing: [facility] inpatient rehabilitation units require testing within 48 hours of all potential admissions at this time  *Re-testing is NOT required for patients recovering from COVID-19 infection if isolation has been discontinued per CDC criteria  Medical Co-morbidities Plan:  · Acute respiratory failure with hypoxia improving  · Hyponatremia   · Acute kidney injury  · Autoimmune hemolytic anemia   · Hx of Clostridium difficile infection now resolved on PO vancomycin for prophylaxis   · Diverticulosis  · Hx of pulmonary embolism   · DVT ppx: Pradaxa and SCD    Thank you for this consultation  Do not hesitate to contact service with further questions  VISHNU Cloud  PM&R    I have spent a total time of 30 minutes on 05/12/23 in caring for this patient including Patient and family education, Counseling / Coordination of care, Documenting in the medical record, Reviewing / ordering tests, medicine, procedures  , Obtaining or reviewing history   and Communicating with other healthcare professionals       History of Present Illness:  Cheryl Douglas is a 70 y o  male with a PMH of metastatic melanoma with brain mets, diverticulosis, autoimmune hemolytic anemia who  presented to the [address] [place] [address] on 4/30/2023 with weakness and SOB He was recently admitted on 4/11 for diverticulitis and then again on 4/22 form diarrhea/ARABELLA  His diarrhea improved however returned and he developed persistent cough with SOB  CTA on 4/30 showed new bilateral lung ground glass opacities and infiltrates along with previously seen small lung nodules suspicious for metastases  Also noted was scattered liver lesions concerning for metastasis and multiple intra-abdominal and right inguinal nodules concerning for metastasis  Pulmonology was consulted and there was concern for atypical or opportunistic infections vs chemotherapy related pneumonitis  He underwent bronchoscopy which was positive for pneumocystis carinii pneumonia likely due to steroid use in the setting of metastatic malignancy and possible BRAF therapy  He was initially on IV TMP-SMX with steroids and now on PO Bactrim for a 21 day course through 5/24/2023  ID is recommending a 21 day course of adjunctive corticosteroids  He was previously on Encorafenib and Binimetinib for metastatic melanoma and this is currently on hold  Per oncology he is to continue on dexamethasone taper  MRI is pending as patient has been having hallucinations likely in the setting of brain mets with whole brain radiation  PM&R are consulted for rehabilitation recommendations  The patient was seen in his room  He currently denies any numbness, tingling, chest pain or palpitations  He reports his breathing has improved and he feels stronger though somewhat generally weak due to being in the hospital  He denies any pain, headache or current hallucinations  He reports mild SOB with exertion but none at rest      Review of Systems: 10 point ROS negative except for what is noted in HPI    Function:  Prior level of function and living situation: The patient lives in a two level home, patient reports he can have [address]  There are 5 [address]  He lives with his spouse who he reports can assist as needed   He was "independent  Current level of function:  Physical Therapy: Moderate assist for bed mobility, moderate assist for transfers   Occupational Therapy: Moderate assist for LB dressing         Physical Exam:  /79   Pulse 72   Temp 97 9 °F (36 6 °C)   Resp 20   Ht 5' 8\" (1 727 m)   Wt 87 6 kg (193 lb 2 oz)   SpO2 93%   BMI 29 36 kg/m²        Intake/Output Summary (Last 24 hours) at 5/12/2023 1156  Last data filed at 5/12/2023 1155  Gross per 24 hour   Intake 340 ml   Output 100 ml   Net 240 ml       Body mass index is 29 36 kg/m²  Physical Exam  Constitutional:       General: He is not in acute distress  Appearance: He is not toxic-appearing  HENT:      Head: Normocephalic and atraumatic  Right Ear: External ear normal       Left Ear: External ear normal       Nose: Nose normal       Mouth/Throat:      Mouth: Mucous membranes are moist       Pharynx: Oropharynx is clear  Pulmonary:      Effort: Pulmonary effort is normal  No respiratory distress  Abdominal:      General: There is no distension  Musculoskeletal:         General: Normal range of motion  Skin:     General: Skin is warm and dry  Coloration: Skin is pale  Neurological:      Mental Status: He is alert and oriented to person, place, and time  Psychiatric:         Mood and Affect: Mood normal               Social History:    Social History     Socioeconomic History   • Marital status: /Civil Union     Spouse name: None   • Number of children: None   • Years of education: None   • Highest education level: None   Occupational History   • None   Tobacco Use   • Smoking status: Some Days     Packs/day: 0 00     Years: 5 00     Pack years: 0 00     Types: Cigars, Cigarettes   • Smokeless tobacco: Current     Types: Snuff   • Tobacco comments:     5  a week average   Vaping Use   • Vaping Use: Never used   Substance and Sexual Activity   • Alcohol use:  Yes     Alcohol/week: 6 0 standard drinks     Types: 6 Cans " of beer per week     Comment: socially   • Drug use: Yes     Types: Marijuana     Comment: medical marijuana   • Sexual activity: Yes     Partners: Female     Birth control/protection: None   Other Topics Concern   • None   Social History Narrative    Daily coffee consumption (2 cups/day)    reitred     Social Determinants of Health     Financial Resource Strain: Not on file   Food Insecurity: No Food Insecurity   • Worried About Running Out of Food in the Last Year: Never true   • Ran Out of Food in the Last Year: Never true   Transportation Needs: No Transportation Needs   • Lack of Transportation (Medical): No   • Lack of Transportation (Non-Medical):  No   Physical Activity: Not on file   Stress: Not on file   Social Connections: Not on file   Intimate Partner Violence: Not on file   Housing Stability: Low Risk    • Unable to Pay for Housing in the Last Year: No   • Number of Places Lived in the Last Year: 1   • Unstable Housing in the Last Year: No        Family History:    Family History   Problem Relation Age of Onset   • Cancer Mother    • Breast cancer Mother    • Other Father         CABG   • Heart attack Paternal Grandfather         acute MI         Medications:     Current Facility-Administered Medications:   •  acetaminophen (TYLENOL) tablet 650 mg, 650 mg, Oral, Q6H PRN, Rafia Sotomayor MD  •  albuterol (PROVENTIL HFA,VENTOLIN HFA) inhaler 2 puff, 2 puff, Inhalation, Q4H PRN, Rafia Sotomayor MD, 2 puff at 05/05/23 1346  •  ALPRAZolam (XANAX) tablet 0 5 mg, 0 5 mg, Oral, HS PRN, Rafia Sotomayor MD  •  aluminum-magnesium hydroxide-simethicone (MYLANTA) oral suspension 30 mL, 30 mL, Oral, Q6H PRN, Rafia Sotomayor MD  •  benzonatate (TESSALON PERLES) capsule 100 mg, 100 mg, Oral, TID PRN, Genet Paz MD, 100 mg at 04/30/23 8647  •  carbamide peroxide (DEBROX) 6 5 % otic solution 5 drop, 5 drop, Both Ears, BID, Rafia Sotomayor MD, 5 drop at 05/12/23 1215  •  cosyntropin (CORTROSYN) injection 0 25 mg, 0 25 mg, Intravenous, Once, Jacob Marshall DO  •  dabigatran etexilate (PRADAXA) capsule 150 mg, 150 mg, Oral, Q12H [facility], Johnathan Dominguez MD, 150 mg at 05/12/23 9249  •  dexamethasone (DECADRON) tablet 4 mg, 4 mg, Oral, Q12H [facility], VISHNU Chavez, 4 mg at 05/12/23 0167  •  dextromethorphan-guaiFENesin (ROBITUSSIN DM) oral syrup 10 mL, 10 mL, Oral, Q4H PRN, Johnathan Dominguez MD, 10 mL at 05/01/23 1405  •  memantine (NAMENDA) tablet 10 mg, 10 mg, Oral, BID, Johnathan Dominguez MD, 10 mg at 05/12/23 88  •  metoprolol succinate (TOPROL-XL) 24 hr tablet 12 5 mg, 12 5 mg, Oral, Daily, Johnathan Dominguez MD, 12 5 mg at 05/12/23 0929  •  ondansetron (ZOFRAN) injection 4 mg, 4 mg, Intravenous, Q6H PRN, Johnathan Dominguez MD  •  pantoprazole (PROTONIX) EC tablet 40 mg, 40 mg, Oral, Early Morning, Johnathan Dominguez MD, 40 mg at 05/12/23 1724  •  polyethylene glycol (MIRALAX) packet 17 g, 17 g, Oral, Daily PRN, Johnathan Dominguez MD  •  prazosin (MINIPRESS) capsule 1 mg, 1 mg, Oral, HS, VISHNU Forman, 1 mg at 05/11/23 1917  •  sulfamethoxazole-trimethoprim (BACTRIM DS) 800-160 mg per tablet 2 tablet, 2 tablet, Oral, Q8H [facility], Audra Johnson MD, 2 tablet at 05/12/23 0525    Past Medical History:     Past Medical History:   Diagnosis Date   • Acute diverticulitis 4/12/2023   • ARABELLA (acute kidney injury) ([facility] [address] ) 3/29/2018   • Anemia 11/02/2016   • Anxiety    • Arthritis    • Autoimmune hemolytic anemia ([facility] )    • Claustrophobia    • COVID 12/2020   • DVT (deep venous thrombosis) (HCC)    • GERD (gastroesophageal reflux disease)    • Hearing loss, right    • Hemolytic anemia (HCC)    • History of transfusion     2018 - no adverse reaction   • Hypertension    • Malignant melanoma of leg, right ([facility] ) 07/01/2022   • Palpitation    • Portal vein thrombosis    • PTSD (post-traumatic stress disorder)    • Pulmonary emboli ([facility] [address] )    • Squamous cell skin cancer 12/20/2022    SCCIS- 12/6/22   • Tobacco abuse         Past Surgical History:     Past Surgical History:   Procedure Laterality Date   • CATARACT EXTRACTION Bilateral    • CHOLECYSTECTOMY  07/18/2017   • COLONOSCOPY     • ELBOW ARTHROPLASTY Left     bursectomy   • IR BIOPSY RETROPERITONEUM  3/17/2023   • IR DRAINAGE TUBE CHECK WITH SCLEROSIS  10/06/2022   • IR DRAINAGE TUBE CHECK WITH SCLEROSIS  10/10/2022   • IR DRAINAGE TUBE CHECK WITH SCLEROSIS  10/20/2022   • IR DRAINAGE TUBE CHECK WITH SCLEROSIS  10/27/2022   • IR DRAINAGE TUBE CHECK WITH SCLEROSIS  11/04/2022   • IR DRAINAGE TUBE CHECK WITH SCLEROSIS  11/15/2022   • IR DRAINAGE TUBE CHECK WITH SCLEROSIS  11/25/2022   • IR DRAINAGE TUBE PLACEMENT  09/19/2022   • JOINT REPLACEMENT Right 02/02/2021    knee   • KNEE SURGERY Right     meniscus tear   • LYMPH NODE BIOPSY Right 07/29/2022    Procedure: BIOPSY LYMPH NODE SENTINEL;  Surgeon: Lavell Okeefe MD;  [facility];  Service: Surgical Oncology   • MOHS SURGERY Right 12/20/2022    Right dorsal hand SCCIS-Dr Isabel   • MO ARTHRP KNE CONDYLE&PLATU MEDIAL&LAT COMPARTMENTS Right 02/02/2021    Procedure: ARTHROPLASTY KNEE TOTAL;  Surgeon: Hannah Blackmon MD;  [facility];  Service: Orthopedics   • MO ARTHRP KNE CONDYLE&PLATU MEDIAL&LAT COMPARTMENTS Left 11/17/2021    Procedure: TOTAL KNEE REPLACEMENT;  Surgeon: Hannah Blackmon MD;  [facility];  Service: Orthopedics   • MO LAPS SURG CHOLECYSTECTOMY Dale Smith N/A 12/23/2017    Procedure: CHOLECYSTECTOMY LAPAROSCOPIC with cholangiogram;  Surgeon: Maykel Davis MD;  [facility];  Service: General   • MO SPLENECTOMY TOTAL SEPARATE PROCEDURE N/A 05/18/2017    Procedure: LAPAROSCOPIC HAND ASSIST SPLENECTOMY;  Surgeon: Zena Casillas MD;  [facility];  Service: Surgical Oncology   • SHOULDER SURGERY Left     rotator cuff x4, reconstruction   • SKIN BIOPSY  5 May 2022   • SKIN CANCER EXCISION  29 July 2022   • SKIN LESION EXCISION Right 07/29/2022    Procedure: WIDE EXCISION RIGHT MEDIAL THIGH;  Surgeon: Elizabeth Barragan MD;  [facility]: [facility];  Service: Surgical Oncology         Allergies:     No Known Allergies        LABORATORY RESULTS:      Lab Results   Component Value Date    HGB 12 6 05/11/2023    HCT 38 0 05/11/2023    WBC 18 11 (H) 05/11/2023     Lab Results   Component Value Date    BUN 36 (H) 05/12/2023    K 5 0 05/12/2023    CL 99 05/12/2023    GLUCOSE 200 (H) 05/18/2017    CREATININE 1 55 (H) 05/12/2023     Lab Results   Component Value Date    PROTIME 14 8 (H) 04/22/2023    INR 1 16 04/22/2023        DIAGNOSTIC STUDIES: Reviewed  XR chest 1 view portable    Result Date: 5/1/2023  Impression: Patchy groundglass infiltrates bilaterally, worse from the prior exam  This could be related to atypical pneumonia versus edema  Workstation performed: [id number]     Bronchoscopy    Result Date: 5/2/2023  Impression: Acute hypoxic respiratory failure Multifocal GGO Metastatic melanoma Immunosuppressed state/obesity RECOMMENDATION: Pt transferred to the ICU for BiPAP, alternating FiO2 down now on HFNC Continue antibiotics, and current dose of dexamethasone 4 mg every 8 Follow BAL as above    PE Study with CT Abdomen and Pelvis with contrast    Result Date: 4/30/2023  Impression: 1  No pulmonary artery emboli  2   New bilateral lung groundglass opacities and infiltrates, likely inflammatory/infectious  3  Scattered small lung nodules again visualized, suspicious for metastases  4  Scattered liver lesions again visualized, most concerning for metastases  5  Multiple intra-abdominal and right inguinal nodules, likely metastatic  6  Known osseous metastases better demonstrated on prior PET/CT   Workstation performed: [id number]

## 2023-05-12 NOTE — PLAN OF CARE
Problem: PHYSICAL THERAPY ADULT  Goal: Performs mobility at highest level of function for planned discharge setting  See evaluation for individualized goals  Description: Treatment/Interventions: Functional transfer training, LE strengthening/ROM, Elevations, Therapeutic exercise, Endurance training, Patient/family training, Equipment eval/education, Bed mobility, Gait training, Compensatory technique education, Continued evaluation, Spoke to nursing, OT  Equipment Recommended: Other (Comment) (monitor needs with progress )       See flowsheet documentation for full assessment, interventions and recommendations  Outcome: Progressing  Note: Prognosis: Fair  Problem List: Decreased strength, Decreased endurance, Impaired balance, Decreased mobility, Decreased safety awareness  Assessment: Pt seen for PT treatment session this date with interventions consisting of gait training w/ emphasis on improving pt's ability to ambulate level surfaces x 3 ftx1 with mod A of 2 provided by therapist with RW, Therapeutic exercise consisting of: AROM 15 reps B LE in sitting position and therapeutic activity consisting of training: bed mobility, supine<>sit transfers, sit<>stand transfers and stand pivot transfers towards R direction  Pt agreeable to PT treatment session upon arrival, pt found supine in bed w/ HOB elevated, in no apparent distress and responsive  In comparison to previous session, pt with improvements in strength  Post session: pt returned back to recliner, all needs in reach and RN notified of session findings/recommendations  Continue to recommend post acute rehabilitation services at time of d/c in order to maximize pt's functional independence and safety w/ mobility  Pt continues to be functioning below baseline level  PT will continue to see pt during current hospitalization in order to address the deficits listed above and provide interventions consistent w/ POC in effort to achieve STGs    Barriers to Discharge: None  Barriers to Discharge Comments: stairs  PT Discharge Recommendation: Post acute rehabilitation services    See flowsheet documentation for full assessment  99

## 2023-05-12 NOTE — NURSING NOTE
Cortisol stimulation testing ordered by nephrology at this time  Per the lab, specimens and testing should be done between 7am-10am  RN communicated to nephrology, per nephrology CST to be done tomorrow morning at specified times  Will pass on to nightshift RN in report this evening

## 2023-05-12 NOTE — ASSESSMENT & PLAN NOTE
History of warm antibody hemolytic anemia  Previously treated with Rituxan (8/2022 last received)  • Continue chronic steroid treatment with Dexamethasone  • Previous team spoke to hematology-oncology (Wicho Zavala), May taper Dexamethasone from 4mg Q8 hours to Q12 hours due to side effects (Hallucinations)  (Dexamethasone is for the mets to his brain, previously on prednisone for the hemolytic anemia)  • Appreciate hematology-oncology recommendations

## 2023-05-12 NOTE — PHYSICAL THERAPY NOTE
"   PHYSICAL THERAPY TREATMENT NOTE  NAME:  Rosaura Gallegos  DATE: 05/12/23    Length Of Stay: 12  Performed at least 2 patient identifiers during session: Name and ID bracelet    TREATMENT:      05/12/23 1436   PT Last Visit   PT Visit Date 05/12/23   Note Type   Note Type Treatment   Pain Assessment   Pain Assessment Tool 0-10   Pain Score No Pain   Restrictions/Precautions   Weight Bearing Precautions Per Order No   Other Precautions Contact/isolation;Multiple lines; Fall Risk;O2;Telemetry   General   Chart Reviewed Yes   Family/Caregiver Present No   Cognition   Overall Cognitive Status Impaired   Arousal/Participation Alert; Cooperative   Attention Within functional limits   Orientation Level Oriented to person;Oriented to place;Oriented to time;Disoriented to situation   Memory Decreased recall of precautions;Decreased recall of recent events;Decreased short term memory   Following Commands Follows one step commands without difficulty   Comments pleasant and cooperative   Subjective   Subjective \"I need to work on my legs\"   Bed Mobility   Supine to Sit 3  Moderate assistance   Additional items Assist x 2;HOB elevated; Bedrails; Increased time required;Verbal cues;LE management   Additional Comments patient with retropulsion seated at EOB requiring assist to maintain upright posture  pt seated OOB in chair upon conclusion   Transfers   Sit to Stand 3  Moderate assistance   Additional items Assist x 2;Bedrails; Increased time required;Verbal cues   Stand to Sit 3  Moderate assistance   Additional items Assist x 2;Armrests; Increased time required;Verbal cues   Stand pivot 3  Moderate assistance   Additional items Assist x 2; Increased time required;Verbal cues  (RW)   Additional Comments pt with retropulsion upon standing requiring verbal and tactile cues for wt shifting over JAY   Ambulation/Elevation   Gait pattern Improper Weight shift;Decreased foot clearance;Shuffling;Excessively slow; Inconsistent svetlana   Gait " Assistance 3  Moderate assist   Additional items Assist x 2;Verbal cues; Tactile cues   Assistive Device Rolling walker   Distance 3 ftx1   Balance   Static Sitting Fair -   Dynamic Sitting Poor +   Static Standing Poor   Dynamic Standing Poor -   Ambulatory Poor -   Endurance Deficit   Endurance Deficit Yes   Endurance Deficit Description weakness   Activity Tolerance   Activity Tolerance Patient limited by fatigue   Medical Staff [address] [place], restorative   Nurse Made Aware yes   Exercises   Heelslides Sitting;15 reps;AROM; Bilateral   Hip Abduction Sitting;15 reps;AROM; Bilateral   Hip Adduction Sitting;15 reps;AROM; Bilateral   Knee AROM Long Arc Quad Sitting;15 reps;AROM; Bilateral   Ankle Pumps Sitting;15 reps;AROM; Bilateral   Marching Sitting;15 reps;AROM; Bilateral   Assessment   Prognosis Fair   Problem List Decreased strength;Decreased endurance; Impaired balance;Decreased mobility; Decreased safety awareness   Assessment Pt seen for PT treatment session this date with interventions consisting of gait training w/ emphasis on improving pt's ability to ambulate level surfaces x 3 ftx1 with mod A of 2 provided by therapist with RW, Therapeutic exercise consisting of: AROM 15 reps B LE in sitting position and therapeutic activity consisting of training: bed mobility, supine<>sit transfers, sit<>stand transfers and stand pivot transfers towards R direction  Pt agreeable to PT treatment session upon arrival, pt found supine in bed w/ HOB elevated, in no apparent distress and responsive  In comparison to previous session, pt with improvements in strength  Post session: pt returned back to recliner, all needs in reach and RN notified of session findings/recommendations  Continue to recommend post acute rehabilitation services at time of d/c in order to maximize pt's functional independence and safety w/ mobility  Pt continues to be functioning below baseline level   PT will continue to see pt during current hospitalization in order to address the deficits listed above and provide interventions consistent w/ POC in effort to achieve STGs  Goals   STG Expiration Date 05/13/23   PT Treatment Day 4   Plan   Treatment/Interventions Functional transfer training;LE strengthening/ROM; Therapeutic exercise; Endurance training;Equipment eval/education; Bed mobility;Gait training;Spoke to nursing   Progress Slow progress, decreased activity tolerance   PT Frequency 3-5x/wk   Recommendation   PT Discharge Recommendation Post acute rehabilitation services   AM-PAC Basic Mobility Inpatient   Turning in Flat Bed Without Bedrails 2   Lying on Back to Sitting on Edge of Flat Bed Without Bedrails 1   Moving Bed to Chair 1   Standing Up From Chair Using Arms 2   Walk in Room 1   Climb 3-5 Stairs With Railing 1   Basic Mobility Inpatient Raw Score 8   Turning Head Towards Sound 4   Follow Simple Instructions 4   Low Function Basic Mobility Raw Score  16   Low Function Basic Mobility Standardized Score  25 72   Highest Level Of Mobility   -HLM Goal 3: Sit at edge of bed   JH-HLM Achieved 4: Move to chair/commode   Education   Education Provided Mobility training;Assistive device   Patient Demonstrates acceptance/verbal understanding;Reinforcement needed   End of Consult   Patient Position at End of Consult Bedside chair; All needs within reach       The patient's AM-PAC Basic Mobility Inpatient Short Form Raw Score is 8  A Raw score of less than or equal to 16 suggests the patient may benefit from discharge to post-acute rehabilitation services  Please also refer to the recommendation of the Physical Therapist for safe discharge planning        Johnnie Hernandez PTA,JAVIER

## 2023-05-12 NOTE — PROGRESS NOTES
NEPHROLOGY PROGRESS NOTE   Sindy Yo 70 y o  male MRN: [id number]  Unit/Bed#: [facility] 68 2 -01 Encounter: [id number]  Reason for Consult: Acute kidney injury    Assessment / Plan :  1  Acute kidney injury, etiology likely due to impaired creatinine secretion from ongoing Bactrim use  Previous EGFR between 55 and 60  Total urinary creatinine secretion can approach 15 to 20% in those with normal renal function  Can approach nearly 40% in those with advanced CKD  a   UA with microscopy shows 20-30 RBCs, 10-20 white cells noted uric acid crystals? Sulfonamide crystals  b  No evidence of obstructive etiology seen on previous CT  2  Hyponatremia likely component due to previous hydrochlorothiazide use  Possible component due to SIADH  Urinary sodium 65 with a urine osmolality of 741  Cortisol 4, recommending cosyntropin stim testing  3  Nongap metabolic acidosis, likely secondary to previous IV fluids  Recommend initiating sodium bicarbonate therapy  4  Hypertension, blood pressure currently appears stable  Holding hydrochlorothiazide  5  PCP pneumonia currently on Bactrim as per infectious disease, anticipating 21-day course  6  Malignant melanoma with noted metastatic disease to the brain lung and liver  Status post whole brain radiation  7  History of C  difficile colitis    PLAN:  · Renal function overall improved with a creatinine of 1 55 again etiology likely due to Bactrim given urine findings, questionable sulfonamide crystal formation  Check uric acid level  · Avoiding loop diuretic therapy given ongoing Bactrim use  · Start sodium bicarbonate tablets  · Given cortisol l< 10 recommend cosyntropin stim testing    SUBJECTIVE:  Seen and examined  Patient awake alert  Offers no new complaints  Denies any chest pain or shortness of breath  No abdominal pain  Appetite appears stable      Review of Systems    OBJECTIVE:  Current Weight: Weight - Scale: 87 6 kg (193 lb 2 oz)  Vitals: 05/11/23 0423 05/11/23 0731 05/11/23 2156 05/12/23 0843   BP: 136/79 117/74 134/80 132/79   BP Location:   Left arm    Pulse: 88 80 77 72   Resp:  16 18 20   Temp: 97 7 °F (36 5 °C) 98 2 °F (36 8 °C) 97 9 °F (36 6 °C) 97 9 °F (36 6 °C)   TempSrc:   Oral    SpO2: 92% 93% 90% 93%   Weight:       Height:           Intake/Output Summary (Last 24 hours) at 5/12/2023 1324  Last data filed at 5/12/2023 1155  Gross per 24 hour   Intake 340 ml   Output 100 ml   Net 240 ml       Physical Exam  Constitutional:       Appearance: He is not ill-appearing  Eyes:      General: No scleral icterus  Cardiovascular:      Rate and Rhythm: Normal rate and regular rhythm  Pulmonary:      Effort: Pulmonary effort is normal       Breath sounds: Normal breath sounds  Abdominal:      General: There is no distension  Palpations: Abdomen is soft  Musculoskeletal:      Right lower leg: No edema  Left lower leg: No edema  Skin:     General: Skin is warm and dry  Findings: No rash  Neurological:      Mental Status: He is alert and oriented to person, place, and time           Medications:    Current Facility-Administered Medications:   •  acetaminophen (TYLENOL) tablet 650 mg, 650 mg, Oral, Q6H PRN, Vero Villaseñor MD  •  albuterol (PROVENTIL HFA,VENTOLIN HFA) inhaler 2 puff, 2 puff, Inhalation, Q4H PRN, Vero Villaseñor MD, 2 puff at 05/05/23 1346  •  ALPRAZolam (XANAX) tablet 0 5 mg, 0 5 mg, Oral, HS PRN, Vero Villaseñor MD  •  aluminum-magnesium hydroxide-simethicone (MYLANTA) oral suspension 30 mL, 30 mL, Oral, Q6H PRN, Vero Villaseñor MD  •  benzonatate (TESSALON PERLES) capsule 100 mg, 100 mg, Oral, TID PRN, Heather Paz MD, 100 mg at 04/30/23 2305  •  carbamide peroxide (DEBROX) 6 5 % otic solution 5 drop, 5 drop, Both Ears, BID, Vero Villaseñor MD, 5 drop at 05/12/23 4782  •  cosyntropin (CORTROSYN) injection 0 25 mg, 0 25 mg, Intravenous, Once, Nicole Nath Kaitlin Suarez DO  •  dabigatran etexilate (PRADAXA) capsule 150 mg, 150 mg, Oral, Q12H [address], Karlie Kate MD, 150 mg at 05/12/23 0929  •  dexamethasone (DECADRON) tablet 4 mg, 4 mg, Oral, Q12H [address], VISHNU Sutton, 4 mg at 05/12/23 5205  •  dextromethorphan-guaiFENesin (ROBITUSSIN DM) oral syrup 10 mL, 10 mL, Oral, Q4H PRN, Elly Paz MD, 10 mL at 05/01/23 1405  •  memantine (NAMENDA) tablet 10 mg, 10 mg, Oral, BID, Karlie Kate MD, 10 mg at 05/12/23 5450  •  metoprolol succinate (TOPROL-XL) 24 hr tablet 12 5 mg, 12 5 mg, Oral, Daily, Elly Paz MD, 12 5 mg at 05/12/23 1054  •  ondansetron (ZOFRAN) injection 4 mg, 4 mg, Intravenous, Q6H PRN, Karlie Kate MD  •  pantoprazole (PROTONIX) EC tablet 40 mg, 40 mg, Oral, Early Morning, Karlie Kate MD, 40 mg at 05/12/23 0697  •  polyethylene glycol (MIRALAX) packet 17 g, 17 g, Oral, Daily PRN, Karlie Kate MD  •  prazosin (MINIPRESS) capsule 1 mg, 1 mg, Oral, HS, VISHNU Angeles, 1 mg at 05/11/23 2157  •  sodium chloride tablet 1 g, 1 g, Oral, 4x Daily (with meals and at bedtime), Yue Chance DO, 1 g at 05/12/23 1300  •  sulfamethoxazole-trimethoprim (BACTRIM DS) 800-160 mg per tablet 2 tablet, 2 tablet, Oral, Q8H [address], Laurie Spears MD, 2 tablet at 05/12/23 1300    Laboratory Results:  Results from last 7 days   Lab Units 05/12/23  0504 05/11/23  0438 05/10/23  0458 05/09/23  0604 05/08/23  0427 05/07/23  0519 05/06/23  0540   WBC Thousand/uL  --  18 11* 18 10* 18 95* 16 64* 17 26* 19 84*   HEMOGLOBIN g/dL  --  12 6 11 0* 11 5* 10 6* 10 6* 10 0*   HEMATOCRIT %  --  38 0 32 0* 33 6* 31 2* 31 3* 29 0*   PLATELETS Thousands/uL  --  688* 609* 547* 497* 434* 389   POTASSIUM mmol/L 5 0 4 3 4 5 4 3 4 4 4 2 3 9   CHLORIDE mmol/L 99 94* 94* 93* 93* 94* 95*   CO2 mmol/L 16* 21 21 22 22 25 25   BUN mg/dL 36* 36* 32* 26* 24 20 16   CREATININE mg/dL 1 55* 1 71* 1 62* 1 53* 1 49* 1 38* 1 30   CALCIUM mg/dL 8 7 9 7 9 1 9 2 9 1 9 1 8 6   MAGNESIUM mg/dL  --  2 2 1 9 1 9 1 7* 1 7* 1 7*

## 2023-05-12 NOTE — ASSESSMENT & PLAN NOTE
Possible steroids, bactrim, or hyponatremia  Patient reports that hallucinations somehow improved  Will assess for further improvement now that the steroids and bactrim dose was decreased  Hyponatremia also improving

## 2023-05-12 NOTE — ARC ADMISSION
Reviewed updates with [facility] physician - will continue to follow patient's case at this time, monitoring for medical stability (MRI brain pending, continues with hyponatremia) and functional progress  PM&R consult also remains pending at this time  CM has been updated

## 2023-05-12 NOTE — RESTORATIVE TECHNICIAN NOTE
Restorative Technician Note      Patient Name: Ramez Epps     Restorative Tech Visit Date: 05/12/23  Note Type: Mobility  Patient Position Upon Consult: Supine  Activity Performed: Ambulated; Other (Comment) (Assisted PTA with getting pt to bedside chair)  Assistive Device: Roller walker  Patient Position at End of Consult: Bedside chair; All needs within reach;  Other (comment) (Left in care of PTA)

## 2023-05-12 NOTE — ASSESSMENT & PLAN NOTE
Sodium levels downtrending  Hydrohlothiazide held     · Fluid restriction 1500    Recent Labs     05/10/23  0458 05/11/23  0438 05/12/23  0504   SODIUM 125* 129* 126*     Results from last 7 days   Lab Units 05/12/23  0504 05/09/23  1630   OSMO UR mmol/KG  --  741   SODIUM UR   --  65  86   OSMOLALITY, SERUM mmol/*  --

## 2023-05-13 NOTE — ASSESSMENT & PLAN NOTE
Possibly due to hydrochlorothiazide, bactrim  · Due to uptrending levels will have renal evaluate him  · Urinary retention protocol  · US kidney and bladder  · 5/12-appreciate nephrology recommendations, continue with Bactrim although elevated creatinine likely secondary to side effects of Bactrim antibiotics  Have started sodium bicarb tablets, creatinine trending down  Continue monitoring urine output      Recent Labs     05/11/23  0438 05/12/23  0504 05/13/23  0907   BUN 36* 36* 35*   CREATININE 1 71* 1 55* 1 62*   EGFR 39 45 42       Results from last 7 days   Lab Units 05/11/23  1423 05/09/23  1630   BLOOD UA  Large*  --    PROTEIN UA mg/dl 70 (1+)*  --    SODIUM UR   --  65  86

## 2023-05-13 NOTE — ASSESSMENT & PLAN NOTE
History of warm antibody hemolytic anemia  Previously treated with Rituxan (8/2022 last received)  • Continue chronic steroid treatment with Dexamethasone  • Previous team spoke to hematology-oncology (Shirley Rodriguez), May taper Dexamethasone from 4mg Q8 hours to Q12 hours due to side effects (Hallucinations)  (Dexamethasone is for the mets to his brain, previously on prednisone for the hemolytic anemia)  • Appreciate hematology-oncology recommendations

## 2023-05-13 NOTE — PLAN OF CARE
Problem: Potential for Falls  Goal: Patient will remain free of falls  Description: INTERVENTIONS:  - Educate patient/family on patient safety including physical limitations  - Instruct patient to call for assistance with activity   - Consult OT/PT to assist with strengthening/mobility   - Keep Call bell within reach  - Keep bed low and locked with side rails adjusted as appropriate  - Keep care items and personal belongings within reach  - Initiate and maintain comfort rounds  - Make Fall Risk Sign visible to staff  - Offer Toileting every 2 Hours, in advance of need  - Initiate/Maintain bed alarm  - Obtain necessary fall risk management equipment:   - Apply yellow socks and bracelet for high fall risk patients  - Consider moving patient to room near nurses station  Outcome: Progressing     Problem: INFECTION - ADULT  Goal: Absence or prevention of progression during hospitalization  Description: INTERVENTIONS:  - Assess and monitor for signs and symptoms of infection  - Monitor lab/diagnostic results  - Monitor all insertion sites, i e  indwelling lines, tubes, and drains  - Monitor endotracheal if appropriate and nasal secretions for changes in amount and color  - [place] appropriate cooling/warming therapies per order  - Administer medications as ordered  - Instruct and encourage patient and family to use good hand hygiene technique  - Identify and instruct in appropriate isolation precautions for identified infection/condition  Outcome: Progressing  Goal: Absence of fever/infection during neutropenic period  Description: INTERVENTIONS:  - Monitor WBC    Outcome: Progressing     Problem: SAFETY ADULT  Goal: Patient will remain free of falls  Description: INTERVENTIONS:  - Educate patient/family on patient safety including physical limitations  - Instruct patient to call for assistance with activity   - Consult OT/PT to assist with strengthening/mobility   - Keep Call bell within reach  - Keep bed low and locked with side rails adjusted as appropriate  - Keep care items and personal belongings within reach  - Initiate and maintain comfort rounds  - Make Fall Risk Sign visible to staff  - Offer Toileting every 2 Hours, in advance of need  - Initiate/Maintain bed alarm  - Obtain necessary fall risk management equipment:   - Apply yellow socks and bracelet for high fall risk patients  - Consider moving patient to room near nurses station  Outcome: Progressing  Goal: Maintain or return to baseline ADL function  Description: INTERVENTIONS:  -  Assess patient's ability to carry out ADLs; assess patient's baseline for ADL function and identify physical deficits which impact ability to perform ADLs (bathing, care of mouth/teeth, toileting, grooming, dressing, etc )  - Assess/evaluate cause of self-care deficits   - Assess range of motion  - Assess patient's mobility; develop plan if impaired  - Assess patient's need for assistive devices and provide as appropriate  - Encourage maximum independence but intervene and supervise when necessary  - Involve family in performance of ADLs  - Assess for home care needs following discharge   - Consider OT consult to assist with ADL evaluation and planning for discharge  - Provide patient education as appropriate  Outcome: Progressing  Goal: Maintains/Returns to pre admission functional level  Description: INTERVENTIONS:  - Perform BMAT or MOVE assessment daily    - Set and communicate daily mobility goal to care team and patient/family/caregiver  - Collaborate with rehabilitation services on mobility goals if consulted  - Perform Range of Motion 4 times a day  - Reposition patient every 2 hours    - Dangle patient 4 times a day  - Stand patient 4 times a day  - Ambulate patient 4 times a day  - Out of bed to chair 3 times a day   - Out of bed for meals 3 times a day  - Out of bed for toileting  - Record patient progress and toleration of activity level   Outcome: Progressing Problem: RESPIRATORY - ADULT  Goal: Achieves optimal ventilation and oxygenation  Description: INTERVENTIONS:  - Assess for changes in respiratory status  - Assess for changes in mentation and behavior  - Position to facilitate oxygenation and minimize respiratory effort  - Oxygen administered by appropriate delivery if ordered  - Initiate smoking cessation education as indicated  - Encourage broncho-pulmonary hygiene including cough, deep breathe, Incentive Spirometry  - Assess the need for suctioning and aspirate as needed  - Assess and instruct to report SOB or any respiratory difficulty  - Respiratory Therapy support as indicated  Outcome: Progressing     Problem: GASTROINTESTINAL - ADULT  Goal: Minimal or absence of nausea and/or vomiting  Description: INTERVENTIONS:  - Administer IV fluids if ordered to ensure adequate hydration  - Maintain NPO status until nausea and vomiting are resolved  - Nasogastric tube if ordered  - Administer ordered antiemetic medications as needed  - Provide nonpharmacologic comfort measures as appropriate  - Advance diet as tolerated, if ordered  - Consider nutrition services referral to assist patient with adequate nutrition and appropriate food choices  Outcome: Progressing  Goal: Maintains or returns to baseline bowel function  Description: INTERVENTIONS:  - Assess bowel function  - Encourage oral fluids to ensure adequate hydration  - Administer IV fluids if ordered to ensure adequate hydration  - Administer ordered medications as needed  - Encourage mobilization and activity  - Consider nutritional services referral to assist patient with adequate nutrition and appropriate food choices  Outcome: Progressing  Goal: Maintains adequate nutritional intake  Description: INTERVENTIONS:  - Monitor percentage of each meal consumed  - Identify factors contributing to decreased intake, treat as appropriate  - Assist with meals as needed  - Monitor I&O, weight, and lab values if indicated  - Obtain nutrition services referral as needed  Outcome: Progressing  Goal: Establish and maintain optimal ostomy function  Description: INTERVENTIONS:  - Assess bowel function  - Encourage oral fluids to ensure adequate hydration  - Administer IV fluids if ordered to ensure adequate hydration   - Administer ordered medications as needed  - Encourage mobilization and activity  - Nutrition services referral to assist patient with appropriate food choices  - Assess stoma site  - Consider wound care consult   Outcome: Progressing  Goal: Oral mucous membranes remain intact  Description: INTERVENTIONS  - Assess oral mucosa and hygiene practices  - Implement preventative oral hygiene regimen  - Implement oral medicated treatments as ordered  - Initiate Nutrition services referral as needed  Outcome: Progressing

## 2023-05-13 NOTE — PLAN OF CARE
Problem: Potential for Falls  Goal: Patient will remain free of falls  Description: INTERVENTIONS:  - Educate patient/family on patient safety including physical limitations  - Instruct patient to call for assistance with activity   - Consult OT/PT to assist with strengthening/mobility   - Keep Call bell within reach  - Keep bed low and locked with side rails adjusted as appropriate  - Keep care items and personal belongings within reach  - Initiate and maintain comfort rounds  - Apply yellow socks and bracelet for high fall risk patients  - Consider moving patient to room near nurses station  Outcome: Progressing     Problem: MOBILITY - ADULT  Goal: Maintain or return to baseline ADL function  Description: INTERVENTIONS:  -  Assess patient's ability to carry out ADLs; assess patient's baseline for ADL function and identify physical deficits which impact ability to perform ADLs (bathing, care of mouth/teeth, toileting, grooming, dressing, etc )  - Assess/evaluate cause of self-care deficits   - Assess range of motion  - Assess patient's mobility; develop plan if impaired  - Assess patient's need for assistive devices and provide as appropriate  - Encourage maximum independence but intervene and supervise when necessary  - Involve family in performance of ADLs  - Assess for home care needs following discharge   - Consider OT consult to assist with ADL evaluation and planning for discharge  - Provide patient education as appropriate  Outcome: Progressing  Goal: Maintains/Returns to pre admission functional level  Description: INTERVENTIONS:  - Perform BMAT or MOVE assessment daily    - Set and communicate daily mobility goal to care team and patient/family/caregiver     - Collaborate with rehabilitation services on mobility goals if consulted  - Out of bed for toileting  - Record patient progress and toleration of activity level   Outcome: Progressing     Problem: INFECTION - ADULT  Goal: Absence or prevention of progression during hospitalization  Description: INTERVENTIONS:  - Assess and monitor for signs and symptoms of infection  - Monitor lab/diagnostic results  - Monitor all insertion sites, i e  indwelling lines, tubes, and drains  - Monitor endotracheal if appropriate and nasal secretions for changes in amount and color  - [place] appropriate cooling/warming therapies per order  - Administer medications as ordered  - Instruct and encourage patient and family to use good hand hygiene technique  - Identify and instruct in appropriate isolation precautions for identified infection/condition  Outcome: Progressing  Goal: Absence of fever/infection during neutropenic period  Description: INTERVENTIONS:  - Monitor WBC    Outcome: Progressing     Problem: SAFETY ADULT  Goal: Patient will remain free of falls  Description: INTERVENTIONS:  - Educate patient/family on patient safety including physical limitations  - Instruct patient to call for assistance with activity   - Consult OT/PT to assist with strengthening/mobility   - Keep Call bell within reach  - Keep bed low and locked with side rails adjusted as appropriate  - Keep care items and personal belongings within reach  - Initiate and maintain comfort rounds  - Apply yellow socks and bracelet for high fall risk patients  - Consider moving patient to room near nurses station  Outcome: Progressing  Goal: Maintain or return to baseline ADL function  Description: INTERVENTIONS:  -  Assess patient's ability to carry out ADLs; assess patient's baseline for ADL function and identify physical deficits which impact ability to perform ADLs (bathing, care of mouth/teeth, toileting, grooming, dressing, etc )  - Assess/evaluate cause of self-care deficits   - Assess range of motion  - Assess patient's mobility; develop plan if impaired  - Assess patient's need for assistive devices and provide as appropriate  - Encourage maximum independence but intervene and supervise when necessary  - Involve family in performance of ADLs  - Assess for home care needs following discharge   - Consider OT consult to assist with ADL evaluation and planning for discharge  - Provide patient education as appropriate  Outcome: Progressing  Goal: Maintains/Returns to pre admission functional level  Description: INTERVENTIONS:  - Perform BMAT or MOVE assessment daily    - Set and communicate daily mobility goal to care team and patient/family/caregiver     - Collaborate with rehabilitation services on mobility goals if consulted  - Out of bed for toileting  - Record patient progress and toleration of activity level   Outcome: Progressing     Problem: CARDIOVASCULAR - ADULT  Goal: Maintains optimal cardiac output and hemodynamic stability  Description: INTERVENTIONS:  - Monitor I/O, vital signs and rhythm  - Monitor for S/S and trends of decreased cardiac output  - Administer and titrate ordered vasoactive medications to optimize hemodynamic stability  - Assess quality of pulses, skin color and temperature  - Assess for signs of decreased coronary artery perfusion  - Instruct patient to report change in severity of symptoms  Outcome: Progressing  Goal: Absence of cardiac dysrhythmias or at baseline rhythm  Description: INTERVENTIONS:  - Continuous cardiac monitoring, vital signs, obtain 12 lead EKG if ordered  - Administer antiarrhythmic and heart rate control medications as ordered  - Monitor electrolytes and administer replacement therapy as ordered  Outcome: Progressing     Problem: RESPIRATORY - ADULT  Goal: Achieves optimal ventilation and oxygenation  Description: INTERVENTIONS:  - Assess for changes in respiratory status  - Assess for changes in mentation and behavior  - Position to facilitate oxygenation and minimize respiratory effort  - Oxygen administered by appropriate delivery if ordered  - Initiate smoking cessation education as indicated  - Encourage broncho-pulmonary hygiene including cough, deep breathe, Incentive Spirometry  - Assess the need for suctioning and aspirate as needed  - Assess and instruct to report SOB or any respiratory difficulty  - Respiratory Therapy support as indicated  Outcome: Progressing     Problem: GASTROINTESTINAL - ADULT  Goal: Minimal or absence of nausea and/or vomiting  Description: INTERVENTIONS:  - Administer IV fluids if ordered to ensure adequate hydration  - Maintain NPO status until nausea and vomiting are resolved  - Nasogastric tube if ordered  - Administer ordered antiemetic medications as needed  - Provide nonpharmacologic comfort measures as appropriate  - Advance diet as tolerated, if ordered  - Consider nutrition services referral to assist patient with adequate nutrition and appropriate food choices  Outcome: Progressing  Goal: Maintains or returns to baseline bowel function  Description: INTERVENTIONS:  - Assess bowel function  - Encourage oral fluids to ensure adequate hydration  - Administer IV fluids if ordered to ensure adequate hydration  - Administer ordered medications as needed  - Encourage mobilization and activity  - Consider nutritional services referral to assist patient with adequate nutrition and appropriate food choices  Outcome: Progressing  Goal: Maintains adequate nutritional intake  Description: INTERVENTIONS:  - Monitor percentage of each meal consumed  - Identify factors contributing to decreased intake, treat as appropriate  - Assist with meals as needed  - Monitor I&O, weight, and lab values if indicated  - Obtain nutrition services referral as needed  Outcome: Progressing  Goal: Establish and maintain optimal ostomy function  Description: INTERVENTIONS:  - Assess bowel function  - Encourage oral fluids to ensure adequate hydration  - Administer IV fluids if ordered to ensure adequate hydration   - Administer ordered medications as needed  - Encourage mobilization and activity  - Nutrition services referral to assist patient with appropriate food choices  - Assess stoma site  - Consider wound care consult   Outcome: Progressing  Goal: Oral mucous membranes remain intact  Description: INTERVENTIONS  - Assess oral mucosa and hygiene practices  - Implement preventative oral hygiene regimen  - Implement oral medicated treatments as ordered  - Initiate Nutrition services referral as needed  Outcome: Progressing     Problem: METABOLIC, FLUID AND ELECTROLYTES - ADULT  Goal: Electrolytes maintained within normal limits  Description: INTERVENTIONS:  - Monitor labs and assess patient for signs and symptoms of electrolyte imbalances  - Administer electrolyte replacement as ordered  - Monitor response to electrolyte replacements, including repeat lab results as appropriate  - Instruct patient on fluid and nutrition as appropriate  Outcome: Progressing  Goal: Fluid balance maintained  Description: INTERVENTIONS:  - Monitor labs   - Monitor I/O and WT  - Instruct patient on fluid and nutrition as appropriate  - Assess for signs & symptoms of volume excess or deficit  Outcome: Progressing  Goal: Glucose maintained within target range  Description: INTERVENTIONS:  - Monitor Blood Glucose as ordered  - Assess for signs and symptoms of hyperglycemia and hypoglycemia  - Administer ordered medications to maintain glucose within target range  - Assess nutritional intake and initiate nutrition service referral as needed  Outcome: Progressing     Problem: Prexisting or High Potential for Compromised Skin Integrity  Goal: Skin integrity is maintained or improved  Description: INTERVENTIONS:  - Identify patients at risk for skin breakdown  - Assess and monitor skin integrity  - Assess and monitor nutrition and hydration status  - Monitor labs   - Assess for incontinence   - Turn and reposition patient  - Assist with mobility/ambulation  - Relieve pressure over bony prominences  - Avoid friction and shearing  - Provide appropriate hygiene as needed including keeping skin clean and dry  - Evaluate need for skin moisturizer/barrier cream  - Collaborate with interdisciplinary team   - Patient/family teaching  - Consider wound care consult   Outcome: Progressing     Problem: Nutrition/Hydration-ADULT  Goal: Nutrient/Hydration intake appropriate for improving, restoring or maintaining nutritional needs  Description: Monitor and assess patient's nutrition/hydration status for malnutrition  Collaborate with interdisciplinary team and initiate plan and interventions as ordered  Monitor patient's weight and dietary intake as ordered or per policy  Utilize nutrition screening tool and intervene as necessary  Determine patient's food preferences and provide high-protein, high-caloric foods as appropriate       INTERVENTIONS:  - Monitor oral intake, urinary output, labs, and treatment plans  - Assess nutrition and hydration status and recommend course of action  - Evaluate amount of meals eaten  - Assist patient with eating if necessary   - Allow adequate time for meals  - Recommend/ encourage appropriate diets, oral nutritional supplements, and vitamin/mineral supplements  - Order, calculate, and assess calorie counts as needed  - Recommend, monitor, and adjust tube feedings and TPN/PPN based on assessed needs  - Assess need for intravenous fluids  - Provide specific nutrition/hydration education as appropriate  - Include patient/family/caregiver in decisions related to nutrition  Outcome: Progressing

## 2023-05-13 NOTE — PROGRESS NOTES
Progress Note - Nephrology   Larraine Kussmaul 70 y o  male MRN: [id number]  Unit/Bed#: [place] 2 -01 Encounter: [id number]    ASSESSMENT and PLAN:  Acute kidney injury  Etiology: Suspect secondary to impaired creatinine secretion with from use along with prerenal component with thiazide  • Current creatinine  • Peak creatinine  Workup:  • Urinalysis with micro reports:20-30 RBCs, 10-20 white cells noted uric acid crystals?  Sulfonamide crystals  • Renal imaging revealed: No obstructive etiology seen on recent CT scan  Plan  • Avoid nephrotoxins, NSAIDs, and limit IV contrast if possible  • Avoid hypotension, perturbations of blood pressure to prevent decreased renal perfusion  • Adjust meds to appropriate GFR  • Optimize hemodynamic status to avoid delay in renal recovery  • Need accurate I/O and daily weights  • Monitor BMP daily    Hyponatremia  Etiology:  Suspect secondary to Dyazide use with possible SIADH given infection pain  • Current sodium 129-slowly improving  • A m  cortisol level less than 10  • cortisol stimulation test today-results pending  • Avoid thiazides on discharge  • With fluid restriction  • Work-up includes urine sodium 65, urine osmolality 741-just of of SIADH  • Continue to monitor BMP    Blood pressure/Hypertension:  • Current blood pressure:  • Current medications include:  • Maximize hemodynamics to maintain MAP >65  • Avoid hypotension or fluctuations in blood pressure  • Will continue to trend    Acute respiratory failure with hypoxia-only improving    Pneumocystitis giovani pneumonia:  · History of metastatic malignancy is post brain radiation  · Antibiotic per infectious disease  · Currently on Bactrim-will need 21-day course through 5/24/2023  · So remains on corticosteroids    Autoimmune hemolytic anemia  · Previously treated with Rituxan in August 2022  · Remains on steroids with dexamethasone  · Hematology oncology following    History of C  difficile infection Remains on vancomycin oral for prophylaxis    History of malignant melanoma: With metastasis to brain lung and liver and bone  · Status post whole brain radiation  · Hematology/oncology as outpatient    History of pulmonary embolism-on Pradaxa  • Current hemoglobin  • Management per primary team  • Transfuse if hemoglobin less than 7 0    Non [place] Metabolic acidosis: Suspect secondary to previous IV fluids  · Sodium bicarbonate tablets started yesterday  · Bicarb improving at 22>16  · Decreased dosage 650 mg 3 times daily with meals  · BMP in a m  · Potassium improving        Medical records through [address] [place] [address] and care everywhere has been reviewed for this encounter          Review of systems  Patient seen and examined at bedside: Patient reports generalized pain/but no other concerns or issues  Review of Systems   Constitutional: Negative  Negative for activity change, appetite change, chills, diaphoresis, fatigue and fever  HENT: Negative  Negative for congestion and facial swelling  Respiratory: Negative  Cardiovascular: Negative  Gastrointestinal: Negative  Endocrine: Negative  Genitourinary: Negative  Musculoskeletal: Negative  Skin: Negative  Allergic/Immunologic: Negative  Neurological: Negative  Hematological: Negative  Psychiatric/Behavioral: Negative  Physical exam:  Current Weight: Weight - Scale: 87 6 kg (193 lb 2 oz)  Vitals:    05/12/23 1549 05/12/23 2111 05/12/23 2200 05/13/23 0818   BP: 134/81 121/90  137/93   Pulse: 86 (!) 108 98 81   Resp: 18 16  20   Temp: (!) 97 2 °F (36 2 °C) 97 9 °F (36 6 °C)  (!) 97 4 °F (36 3 °C)   TempSrc:       SpO2: 90% 92%  92%   Weight:       Height:           Intake/Output Summary (Last 24 hours) at 5/13/2023 1111  Last data filed at 5/12/2023 2201  Gross per 24 hour   Intake 530 ml   Output --   Net 530 ml       Physical Exam  Vitals and nursing note reviewed  Constitutional:       Appearance: Normal appearance     HENT:      Head: Normocephalic and atraumatic  Nose: Nose normal       Mouth/Throat:      Mouth: Mucous membranes are moist       Pharynx: Oropharynx is clear  Eyes:      Extraocular Movements: Extraocular movements intact  Conjunctiva/sclera: Conjunctivae normal    Cardiovascular:      Rate and Rhythm: Normal rate and regular rhythm  Pulses: Normal pulses  Heart sounds: Normal heart sounds  Pulmonary:      Effort: Pulmonary effort is normal       Breath sounds: Normal breath sounds  Abdominal:      General: Bowel sounds are normal       Palpations: Abdomen is soft  Musculoskeletal:         General: Normal range of motion  Cervical back: Normal range of motion and neck supple  Skin:     General: Skin is warm and dry  Neurological:      General: No focal deficit present  Mental Status: He is alert and oriented to person, place, and time  Psychiatric:         Mood and Affect: Mood normal          Thought Content:  Thought content normal          Judgment: Judgment normal             Medications:    Current Facility-Administered Medications:   •  acetaminophen (TYLENOL) tablet 650 mg, 650 mg, Oral, Q6H PRN, Karlie Kate MD, 650 mg at 05/13/23 0916  •  albuterol (PROVENTIL HFA,VENTOLIN HFA) inhaler 2 puff, 2 puff, Inhalation, Q4H PRN, Karlie Kate MD, 2 puff at 05/05/23 1346  •  ALPRAZolam (XANAX) tablet 0 5 mg, 0 5 mg, Oral, HS PRN, Karlie Kate MD  •  aluminum-magnesium hydroxide-simethicone (MYLANTA) oral suspension 30 mL, 30 mL, Oral, Q6H PRN, Karlie Kate MD  •  benzonatate (TESSALON PERLES) capsule 100 mg, 100 mg, Oral, TID PRN, Peola Lacrosse MD Brandi, 100 mg at 04/30/23 2325  •  carbamide peroxide (DEBROX) 6 5 % otic solution 5 drop, 5 drop, Both Ears, BID, Karlie Kate MD, 5 drop at 05/12/23 1801  •  dabigatran etexilate (PRADAXA) capsule 150 mg, 150 mg, Oral, Q12H [facility], Karlie Kate MD, 150 mg at 05/13/23 0915  •  dexamethasone (DECADRON) tablet 4 mg, 4 mg, Oral, Q12H [address], VISHNU Danielson, 4 mg at 05/12/23 2113  •  dextromethorphan-guaiFENesin (ROBITUSSIN DM) oral syrup 10 mL, 10 mL, Oral, Q4H PRN, Baron Isabel MD, 10 mL at 05/01/23 1405  •  memantine (NAMENDA) tablet 10 mg, 10 mg, Oral, BID, Baron Isabel MD, 10 mg at 05/13/23 4876  •  metoprolol succinate (TOPROL-XL) 24 hr tablet 12 5 mg, 12 5 mg, Oral, Daily, Jolanta Paz MD, 12 5 mg at 05/13/23 0915  •  ondansetron (ZOFRAN) injection 4 mg, 4 mg, Intravenous, Q6H PRN, Baron Isabel MD  •  pantoprazole (PROTONIX) EC tablet 40 mg, 40 mg, Oral, Early Morning, Baron Isabel MD, 40 mg at 05/13/23 0601  •  polyethylene glycol (MIRALAX) packet 17 g, 17 g, Oral, Daily PRN, Baron Isabel MD  •  prazosin (MINIPRESS) capsule 1 mg, 1 mg, Oral, HS, VISHNU Stock, 1 mg at 05/11/23 2157  •  sodium bicarbonate tablet 1,300 mg, 1,300 mg, Oral, TID after meals, Natividad Waddell DO, 1,300 mg at 05/13/23 0915  •  sulfamethoxazole-trimethoprim (BACTRIM DS) 800-160 mg per tablet 2 tablet, 2 tablet, Oral, Q8H [address], Kolby Doss MD, 2 tablet at 05/13/23 0601    Laboratory Results:  Results from last 7 days   Lab Units 05/13/23  0907 05/12/23  0504 05/11/23  0438 05/10/23  0458 05/09/23  0604 05/08/23  0427 05/07/23  0519   WBC Thousand/uL  --   --  18 11* 18 10* 18 95* 16 64* 17 26*   HEMOGLOBIN g/dL  --   --  12 6 11 0* 11 5* 10 6* 10 6*   HEMATOCRIT %  --   --  38 0 32 0* 33 6* 31 2* 31 3*   PLATELETS Thousands/uL  --   --  688* 609* 547* 497* 434*   SODIUM mmol/L 129* 126* 129* 125* 126* 126* 127*   POTASSIUM mmol/L 4 4 5 0 4 3 4 5 4 3 4 4 4 2   CHLORIDE mmol/L 98 99 94* 94* 93* 93* 94*   CO2 mmol/L 22 16* 21 21 22 22 25   BUN mg/dL 35* 36* 36* 32* 26* 24 20   CREATININE mg/dL 1 62* 1 55* 1 71* 1 62* 1 53* 1 49* 1 38*   CALCIUM mg/dL 9 1 8 7 9 7 9 1 9 2 9 1 9 1   MAGNESIUM mg/dL  --   --  2 2 "1 9 1 9 1 7* 1 7*           Portions of the record may have been created with voice recognition software  Occasional wrong word or \"sound a like\" substitutions may have occurred due to the inherent limitations of voice recognition software   Read the chart carefully and recognize,   "

## 2023-05-13 NOTE — ASSESSMENT & PLAN NOTE
Hx of diverticulitis, C  Diff in the past  C  Diff PCR positive, but negative toxins     • Continue to monitor off prophylaxis

## 2023-05-13 NOTE — PLAN OF CARE
Problem: Potential for Falls  Goal: Patient will remain free of falls  Description: INTERVENTIONS:  - Educate patient/family on patient safety including physical limitations  - Instruct patient to call for assistance with activity   - Consult OT/PT to assist with strengthening/mobility   - Keep Call bell within reach  - Keep bed low and locked with side rails adjusted as appropriate  - Keep care items and personal belongings within reach  - Initiate and maintain comfort rounds  - Make Fall Risk Sign visible to staff  - Offer Toileting every 2 Hours, in advance of need  - Initiate/Maintain bed alarm  - Obtain necessary fall risk management equipment:   Problem: MOBILITY - ADULT  Goal: Maintain or return to baseline ADL function  Description: INTERVENTIONS:  -  Assess patient's ability to carry out ADLs; assess patient's baseline for ADL function and identify physical deficits which impact ability to perform ADLs (bathing, care of mouth/teeth, toileting, grooming, dressing, etc )  - Assess/evaluate cause of self-care deficits   - Assess range of motion  - Assess patient's mobility; develop plan if impaired  - Assess patient's need for assistive devices and provide as appropriate  - Encourage maximum independence but intervene and supervise when necessary  - Involve family in performance of ADLs  - Assess for home care needs following discharge   - Consider OT consult to assist with ADL evaluation and planning for discharge  - Provide patient education as appropriate  Outcome: Progressing  Goal: Maintains/Returns to pre admission functional level  Description: INTERVENTIONS:  - Perform BMAT or MOVE assessment daily    - Set and communicate daily mobility goal to care team and patient/family/caregiver  - Collaborate with rehabilitation services on mobility goals if consulted  - Perform Range of Motion 4 times a day  - Reposition patient every 2 hours    - Dangle patient 4 times a day  - Stand patient 4 times a day  - Ambulate patient 4 times a day  - Out of bed to chair 3 times a day   - Out of bed for meals 3  Problem: SAFETY ADULT  Goal: Patient will remain free of falls  Description: INTERVENTIONS:  - Educate patient/family on patient safety including physical limitations  - Instruct patient to call for assistance with activity   - Consult OT/PT to assist with strengthening/mobility   - Keep Call bell within reach  - Keep bed low and locked with side rails adjusted as appropriate  - Keep care items and personal belongings within reach  - Initiate and maintain comfort rounds  - Make Fall Risk Sign visible to staff  - Offer Toileting every 2 Hours, in advance of need  - Initiate/Maintain bed alarm  - Obtain necessary fall risk management equipment:   - Apply yellow socks and bracelet for high fall risk patients  - Consider moving patient to room near nurses station  Outcome: Progressing  Goal: Maintain or return to baseline ADL function  Description: INTERVENTIONS:  -  Assess patient's ability to carry out ADLs; assess patient's baseline for ADL function and identify physical deficits which impact ability to perform ADLs (bathing, care of mouth/teeth, toileting, grooming, dressing, etc )  - Assess/evaluate cause of self-care deficits   - Assess range of motion  - Assess patient's mobility; develop plan if impaired  - Assess patient's need for assistive devices and provide as appropriate  - Encourage maximum independence but intervene and supervise when necessary  - Involve family in performance of ADLs  - Assess for home care needs following discharge   - Consider OT consult to assist with ADL evaluation and planning for discharge  - Provide patient education as appropriate  Outcome: Progressing  Goal: Maintains/Returns to pre admission functional level  Description: INTERVENTIONS:  - Perform BMAT or MOVE assessment daily    - Set and communicate daily mobility goal to care team and patient/family/caregiver     - Collaborate with rehabilitation services on mobility goals if consulted  - Perform Range of Motion 4 times a day  - Reposition patient every 2 hours    - Dangle patient 4 times a day  - Stand patient 4 times a day  - Ambulate patient 4 times a day  - Out of bed to chair 3 times a day   - Out of bed for meals 3 times a day  - Out of bed for toileting  - Record patient progress and toleration of activity level   Outcome: Progressing     Problem: INFECTION - ADULT  Goal: Absence or prevention of progression during hospitalization  Description: INTERVENTIONS:  - Assess and monitor for signs and symptoms of infection  - Monitor lab/diagnostic results  - Monitor all insertion sites, i e  indwelling lines, tubes, and drains  - Monitor endotracheal if appropriate and nasal secretions for changes in amount and color  - [place] appropriate cooling/warming therapies per order  - Administer medications as ordered  - Instruct and encourage patient and family to use good hand hygiene technique  - Identify and instruct in appropriate isolation precautions for identified infection/condition  Outcome: Progressing  Goal: Absence of fever/infection during neutropenic period  Description: INTERVENTIONS:  - Monitor WBC    Outcome: Progressing     Problem: CARDIOVASCULAR - ADULT  Goal: Maintains optimal cardiac output and hemodynamic stability  Description: INTERVENTIONS:  - Monitor I/O, vital signs and rhythm  - Monitor for S/S and trends of decreased cardiac output  - Administer and titrate ordered vasoactive medications to optimize hemodynamic stability  - Assess quality of pulses, skin color and temperature  - Assess for signs of decreased coronary artery perfusion  - Instruct patient to report change in severity of symptoms  Outcome: Progressing  Goal: Absence of cardiac dysrhythmias or at baseline rhythm  Description: INTERVENTIONS:  - Continuous cardiac monitoring, vital signs, obtain 12 lead EKG if ordered  - Administer antiarrhythmic and heart rate control medications as ordered  - Monitor electrolytes and administer replacement therapy as ordered  Outcome: Progressing    times a day  - Out of bed for toileting  - Record patient progress and toleration of activity level   Outcome: Progressing     - Apply yellow socks and bracelet for high fall risk patients  - Consider moving patient to room near nurses station  Outcome: Progressing

## 2023-05-13 NOTE — PROGRESS NOTES
[address] [place] [address]  Progress Note  Name: Yonny Jaimes  MRN: [id number]  Unit/Bed#: [place] 2 -01 I Date of Admission: 4/30/2023   Date of Service: 5/13/2023 I Hospital Day: 13    Assessment/Plan   Hallucination  Assessment & Plan  Possible steroids, bactrim, or hyponatremia  Patient reports that hallucinations somehow improved  Will assess for further improvement now that the steroids and bactrim dose was decreased  Hyponatremia also improving   5/13-patient still reports approximately 2-3 episodes of visual hallucinations (flies in the air; bugs crawling on the wall, etc ) daily  As per ID and oncology notes, will attempt to wean dexamethasone to 2 mg every 12 hours tomorrow (infectious disease requesting equivalent of prednisone 20 starting 5/14; dexamethasone 2 mg every 12 hours would meet this and possibly help with neurological sequela )    Hyponatremia  Assessment & Plan  Sodium levels downtrending  Hydrohlothiazide held  · Fluid restriction 1500    Recent Labs     05/11/23  0438 05/12/23  0504 05/13/23  0907   SODIUM 129* 126* 129*     Results from last 7 days   Lab Units 05/12/23  0504 05/09/23  1630   OSMO UR mmol/KG  --  741   SODIUM UR   --  65  80   OSMOLALITY, SERUM mmol/*  --          Pneumocystis carinii pneumonia (HCC)  Assessment & Plan  Positive BAL for Pneumocystis carinii pneumonia  Risk factors in the includes steroid use in the setting of metastatic malignancy and possible BRAF therapy  · Antibiotic management per infectious disease  Previously on IV TMP-SMX and steroids, now on p o  Bactrim to complete 21-day course through 5/24/2023  · Infectious disease recommending 21 days of adjunctive corticosteroids for his hypoxia  Patient currently on chronic Decadron which they state will be sufficient for this purpose  History of Clostridium difficile infection  Assessment & Plan  Hx of diverticulitis, C   Diff in the past  C  Diff PCR positive, but negative toxins  • Continue to monitor off prophylaxis    Metastatic melanoma [facility])  Assessment & Plan  Biopsy confirmed 3/17/2023  Right peritoneum consistent with malignant melanoma with brain mets  BRAF mutation  · Encorafenib and Binimetinib started April 2023 (Currently on hold)    Acute kidney injury [facility])  Assessment & Plan  Possibly due to hydrochlorothiazide, bactrim  · Due to uptrending levels will have renal evaluate him  · Urinary retention protocol  · US kidney and bladder  · 5/12-appreciate nephrology recommendations, continue with Bactrim although elevated creatinine likely secondary to side effects of Bactrim antibiotics  Have started sodium bicarb tablets, creatinine trending down  Continue monitoring urine output  Recent Labs     05/11/23  0438 05/12/23  0504 05/13/23  0907   BUN 36* 36* 35*   CREATININE 1 71* 1 55* 1 62*   EGFR 39 45 42       Results from last 7 days   Lab Units 05/11/23  1423 05/09/23  1630   BLOOD UA  Large*  --    PROTEIN UA mg/dl 70 (1+)*  --    SODIUM UR   --  65  86         Autoimmune hemolytic anemia  Assessment & Plan  History of warm antibody hemolytic anemia  Previously treated with Rituxan (8/2022 last received)  • Continue chronic steroid treatment with Dexamethasone  • Previous team spoke to hematology-oncology ([address]), May taper Dexamethasone from 4mg Q8 hours to Q12 hours due to side effects (Hallucinations)  (Dexamethasone is for the mets to his brain, previously on prednisone for the hemolytic anemia)  • Appreciate hematology-oncology recommendations  History of pulmonary embolism  Assessment & Plan  Maintained on Pradaxa    * Acute respiratory failure with hypoxia [facility])  Assessment & Plan  69-year-old male presenting with cough, chest tightness, shortness of breath, and generalized weakness likely secondary to Pneomocystis Carinii Pneumonia (Diagnosed through bronchoscopy, BAL positive for this)  • Improved   Currently on room air  • Continue antibiotics per PCP as per ID recommendations  VTE Pharmacologic Prophylaxis:   Pharmacologic: Dabigatran (Pradaxa)  Mechanical VTE Prophylaxis in Place: Yes    Patient Centered Rounds: I have performed bedside rounds with nursing staff today  Discussions with Specialists or Other Care Team Provider:     Education and Discussions with Family / Patient: Discussed treatment plan with family and patient who agree with current plan; encouraged to ask questions and participate  Time Spent for Care: 45 minutes  More than 50% of total time spent on counseling and coordination of care as described above  Current Length of Stay: 13 day(s)    Current Patient Status: Inpatient   Certification Statement: The patient will continue to require additional inpatient hospital stay due to Treatment of PCP and neurological sequela    Discharge Plan: To be determined    Code Status: Level 1 - Full Code      Subjective:   Patient seen and examined bedside, no acute distress or discomfort noted  As per MRI yesterday, patient appears to have had good response to treatment  Does complain of some mild right lower quadrant abdominal pain that he reports is sharp and waxing and waning  Appears to be possibly gas related; trial simethicone for improvement  ARABELLA appears stable; likely secondary to Bactrim usage  Review of ins and outs shows adequate urine production  Anticipate down titrating steroids tomorrow; patient still with 2-3 episodes of hallucinations daily  Will need rehab moving forward  Objective:     Vitals:   Temp (24hrs), Av 5 °F (36 4 °C), Min:97 2 °F (36 2 °C), Max:97 9 °F (36 6 °C)    Temp:  [97 2 °F (36 2 °C)-97 9 °F (36 6 °C)] 97 4 °F (36 3 °C)  HR:  [] 81  Resp:  [16-20] 20  BP: (121-137)/(81-93) 137/93  SpO2:  [90 %-92 %] 92 %  Body mass index is 29 36 kg/m²  Input and Output Summary (last 24 hours):        Intake/Output Summary (Last 24 hours) at 2023 [address] [facility] [place] filed at 5/12/2023 2201  Gross per 24 hour   Intake 290 ml   Output --   Net 290 ml       Physical Exam:     Physical Exam  Vitals and nursing note reviewed  Constitutional:       General: He is not in acute distress  Appearance: He is well-developed  HENT:      Head: Normocephalic and atraumatic  Eyes:      Conjunctiva/sclera: Conjunctivae normal    Cardiovascular:      Rate and Rhythm: Normal rate  Heart sounds: No murmur heard  Pulmonary:      Effort: Pulmonary effort is normal  No respiratory distress  Abdominal:      Palpations: Abdomen is soft  Tenderness: There is no abdominal tenderness  Musculoskeletal:         General: No swelling  Cervical back: Neck supple  Skin:     General: Skin is warm and dry  Neurological:      Mental Status: He is alert  Psychiatric:         Mood and Affect: Mood normal            Additional Data:     Labs:    Results from last 7 days   Lab Units 05/11/23  0438 05/10/23  0458 05/09/23  0604   WBC Thousand/uL 18 11*   < > 18 95*   HEMOGLOBIN g/dL 12 6   < > 11 5*   HEMATOCRIT % 38 0   < > 33 6*   PLATELETS Thousands/uL 688*   < > 547*   BANDS PCT %  --   --  7   NEUTROS PCT % 72  --   --    LYMPHS PCT % 18  --   --    LYMPHO PCT %  --   --  8*   MONOS PCT % 6  --   --    MONO PCT %  --   --  6   EOS PCT % 0  --  0    < > = values in this interval not displayed  Results from last 7 days   Lab Units 05/13/23  0907   SODIUM mmol/L 129*   POTASSIUM mmol/L 4 4   CHLORIDE mmol/L 98   CO2 mmol/L 22   BUN mg/dL 35*   CREATININE mg/dL 1 62*   ANION GAP mmol/L 9   CALCIUM mg/dL 9 1   GLUCOSE RANDOM mg/dL 82                           * I Have Reviewed All Lab Data Listed Above  * Additional Pertinent Lab Tests Reviewed:  All Labs Within Last 24 Hours Reviewed    Imaging:    Imaging Reports Reviewed Today Include: MRI brain  Imaging Personally Reviewed by Myself Includes:      Recent Cultures (last 7 days):           Last 24 Hours Medication List:   Current Facility-Administered Medications   Medication Dose Route Frequency Provider Last Rate   • acetaminophen  650 mg Oral Q6H PRN Tiara Beebe MD     • albuterol  2 puff Inhalation Q4H PRN Tiara Beebe MD     • ALPRAZolam  0 5 mg Oral HS PRN Tiara Beebe MD     • aluminum-magnesium hydroxide-simethicone  30 mL Oral Q6H PRN Tiara Beebe MD     • benzonatate  100 mg Oral TID PRN Tiara Beebe MD     • carbamide peroxide  5 drop Both Ears BID Tiara Beebe MD     • dabigatran etexilate  150 mg Oral Q12H [address] Tiara Beebe MD     • dexamethasone  4 mg Oral Q12H [address] [facility] [place], [address]     • dextromethorphan-guaiFENesin  10 mL Oral Q4H PRN Tiara Beebe MD     • memantine  10 mg Oral BID Tiara Beebe MD     • metoprolol succinate  12 5 mg Oral Daily Tiara Beebe MD     • ondansetron  4 mg Intravenous Q6H PRN Tiara Beebe MD     • pantoprazole  40 mg Oral Early Morning Tiara Beebe MD     • polyethylene glycol  17 g Oral Daily PRN Tiara Beebe MD     • prazosin  1 mg Oral HS Danny VISHNU Villasenor     • sodium bicarbonate  650 mg Oral TID after meals Corrinne Emery, CRNP     • sulfamethoxazole-trimethoprim  2 tablet Oral Q8H Segun Champagne MD          Today, Patient Was Seen By: Mindy Walker MD    ** Please Note: Dictation voice to text software may have been used in the creation of this document   **

## 2023-05-13 NOTE — ASSESSMENT & PLAN NOTE
Possible steroids, bactrim, or hyponatremia  Patient reports that hallucinations somehow improved  Will assess for further improvement now that the steroids and bactrim dose was decreased  Hyponatremia also improving   5/13-patient still reports approximately 2-3 episodes of visual hallucinations (flies in the air; bugs crawling on the wall, etc ) daily    As per ID and oncology notes, will attempt to wean dexamethasone to 2 mg every 12 hours tomorrow (infectious disease requesting equivalent of prednisone 20 starting 5/14; dexamethasone 2 mg every 12 hours would meet this and possibly help with neurological sequela )

## 2023-05-13 NOTE — PROGRESS NOTES
[address] [place] [address]  Progress Note  Name: Biju Patton  MRN: [id number]  Unit/Bed#: [facility] 2 -01 I Date of Admission: 4/30/2023   Date of Service: 5/12/2023 I Hospital Day: 12    Assessment/Plan   Hallucination  Assessment & Plan  Possible steroids, bactrim, or hyponatremia  Patient reports that hallucinations somehow improved  Will assess for further improvement now that the steroids and bactrim dose was decreased  Hyponatremia also improving  Hyponatremia  Assessment & Plan  Sodium levels downtrending  Hydrohlothiazide held  · Fluid restriction 1500    Recent Labs     05/10/23  0458 05/11/23  0438 05/12/23  0504   SODIUM 125* 129* 126*     Results from last 7 days   Lab Units 05/12/23  0504 05/09/23  1630   OSMO UR mmol/KG  --  741   SODIUM UR   --  65  80   OSMOLALITY, SERUM mmol/*  --          Pneumocystis carinii pneumonia (HCC)  Assessment & Plan  Positive BAL for Pneumocystis carinii pneumonia  Risk factors in the includes steroid use in the setting of metastatic malignancy and possible BRAF therapy  · Antibiotic management per infectious disease  Previously on IV TMP-SMX and steroids, now on p o  Bactrim to complete 21-day course through 5/24/2023  · Infectious disease recommending 21 days of adjunctive corticosteroids for his hypoxia  Patient currently on chronic Decadron which they state will be sufficient for this purpose  History of Clostridium difficile infection  Assessment & Plan  Hx of diverticulitis, C  Diff in the past  C  Diff PCR positive, but negative toxins  • Continue po vancomycin prophylaxis    Metastatic melanoma [facility])  Assessment & Plan  Biopsy confirmed 3/17/2023  Right peritoneum consistent with malignant melanoma with brain mets  BRAF mutation    · Encorafenib and Binimetinib started April 2023 (Currently on hold)    Acute kidney injury [facility])  Assessment & Plan  Possibly due to hydrochlorothiazide, bactrim  · Due to uptrending levels will have renal evaluate him  · Urinary retention protocol  · US kidney and bladder  · 5/12-appreciate nephrology recommendations, continue with Bactrim although elevated creatinine likely secondary to side effects of Bactrim antibiotics  Have started sodium bicarb tablets, creatinine trending down  Continue monitoring urine output  Recent Labs     05/10/23  0458 05/11/23  0438 05/12/23  0504   BUN 32* 36* 36*   CREATININE 1 62* 1 71* 1 55*   EGFR 42 39 45       Results from last 7 days   Lab Units 05/11/23  1423 05/09/23  1630   BLOOD UA  Large*  --    PROTEIN UA mg/dl 70 (1+)*  --    SODIUM UR   --  65  86         Autoimmune hemolytic anemia  Assessment & Plan  History of warm antibody hemolytic anemia  Previously treated with Rituxan (8/2022 last received)  • Continue chronic steroid treatment with Dexamethasone  • Previous team spoke to hematology-oncology (Sandi Butler), May taper Dexamethasone from 4mg Q8 hours to Q12 hours due to side effects (Hallucinations)  (Dexamethasone is for the mets to his brain, previously on prednisone for the hemolytic anemia)  • Appreciate hematology-oncology recommendations  History of pulmonary embolism  Assessment & Plan  Maintained on Pradaxa    * Acute respiratory failure with hypoxia [facility])  Assessment & Plan  68-year-old male presenting with cough, chest tightness, shortness of breath, and generalized weakness likely secondary to Pneomocystis Carinii Pneumonia (Diagnosed through bronchoscopy, BAL positive for this)  • Improved  Currently on room air  • Continue antibiotics per PCP as per ID recommendations  VTE Pharmacologic Prophylaxis:   Pharmacologic: Dabigatran (Pradaxa)  Mechanical VTE Prophylaxis in Place: Yes    Patient Centered Rounds: I have performed bedside rounds with nursing staff today      Discussions with Specialists or Other Care Team Provider:     Education and Discussions with Family / Patient: Care plan discussed with patient who voiced understanding and agrees with recommendations  Time Spent for Care: 45 minutes  More than 50% of total time spent on counseling and coordination of care as described above  Current Length of Stay: 12 day(s)    Current Patient Status: Inpatient   Certification Statement: The patient will continue to require additional inpatient hospital stay due to Treatment of PCP pneumonia and hallucinations    Discharge Plan: To be determined    Code Status: Level 1 - Full Code      Subjective:   Patient seen and examined bedside, no acute distress or discomfort noted  Patient reports no visual or auditory hallucinations and will undergo MRI tonight  Kidney function appears stable and likely elevated creatinine secondary to use of Bactrim  Decrease steroids to twice daily  Saturating well on room air and likely stable for discharge in 24 to 48 hours  Objective:     Vitals:   Temp (24hrs), Av 7 °F (36 5 °C), Min:97 2 °F (36 2 °C), Max:97 9 °F (36 6 °C)    Temp:  [97 2 °F (36 2 °C)-97 9 °F (36 6 °C)] 97 2 °F (36 2 °C)  HR:  [72-86] 86  Resp:  [18-20] 18  BP: (132-134)/(79-81) 134/81  SpO2:  [90 %-93 %] 90 %  Body mass index is 29 36 kg/m²  Input and Output Summary (last 24 hours): Intake/Output Summary (Last 24 hours) at 2023  Last data filed at 2023 1830  Gross per 24 hour   Intake 780 ml   Output --   Net 780 ml       Physical Exam:     Physical Exam  Vitals and nursing note reviewed  Constitutional:       General: He is not in acute distress  Appearance: He is well-developed  HENT:      Head: Normocephalic and atraumatic  Eyes:      Conjunctiva/sclera: Conjunctivae normal    Cardiovascular:      Rate and Rhythm: Normal rate  Heart sounds: No murmur heard  Pulmonary:      Effort: Pulmonary effort is normal  No respiratory distress  Abdominal:      Palpations: Abdomen is soft  Tenderness: There is no abdominal tenderness     Musculoskeletal: General: No swelling  Cervical back: Neck supple  Skin:     General: Skin is warm and dry  Neurological:      Mental Status: He is alert  Psychiatric:         Mood and Affect: Mood normal            Additional Data:     Labs:    Results from last 7 days   Lab Units 05/11/23  0438 05/10/23  0458 05/09/23  0604   WBC Thousand/uL 18 11*   < > 18 95*   HEMOGLOBIN g/dL 12 6   < > 11 5*   HEMATOCRIT % 38 0   < > 33 6*   PLATELETS Thousands/uL 688*   < > 547*   BANDS PCT %  --   --  7   NEUTROS PCT % 72  --   --    LYMPHS PCT % 18  --   --    LYMPHO PCT %  --   --  8*   MONOS PCT % 6  --   --    MONO PCT %  --   --  6   EOS PCT % 0  --  0    < > = values in this interval not displayed  Results from last 7 days   Lab Units 05/12/23  0504   SODIUM mmol/L 126*   POTASSIUM mmol/L 5 0   CHLORIDE mmol/L 99   CO2 mmol/L 16*   BUN mg/dL 36*   CREATININE mg/dL 1 55*   ANION GAP mmol/L 11   CALCIUM mg/dL 8 7   GLUCOSE RANDOM mg/dL 106                           * I Have Reviewed All Lab Data Listed Above  * Additional Pertinent Lab Tests Reviewed:  All Labs Within Last 24 Hours Reviewed    Imaging:    Imaging Reports Reviewed Today Include:   Imaging Personally Reviewed by Myself Includes:      Recent Cultures (last 7 days):           Last 24 Hours Medication List:   Current Facility-Administered Medications   Medication Dose Route Frequency Provider Last Rate   • acetaminophen  650 mg Oral Q6H PRN Rafia Sotomayor MD     • albuterol  2 puff Inhalation Q4H PRN Rafia Sotomayor MD     • ALPRAZolam  0 5 mg Oral HS PRN Rafia Sotomayor MD     • aluminum-magnesium hydroxide-simethicone  30 mL Oral Q6H PRN Rafia Sotomayor MD     • benzonatate  100 mg Oral TID PRN Rafia Sotomayor MD     • carbamide peroxide  5 drop Both Ears BID Rafia Sotomayor MD     • cosyntropin  0 25 mg Intravenous Once Jose Celestin DO     • dabigatran etexilate  150 mg Oral Q12H [id number] [facility] Suzette Santos MD     • dexamethasone  4 mg Oral Q12H [address] [place], [place]     • dextromethorphan-guaiFENesin  10 mL Oral Q4H PRN Tiara Beebe MD     • memantine  10 mg Oral BID Tiara Beebe MD     • metoprolol succinate  12 5 mg Oral Daily Tiara Beebe MD     • ondansetron  4 mg Intravenous Q6H PRN Tiara Beebe MD     • pantoprazole  40 mg Oral Early Morning Tiara Beebe MD     • polyethylene glycol  17 g Oral Daily PRN Tiara Beebe MD     • prazosin  1 mg Oral HS Danny Cancer, CRNP     • sodium bicarbonate  1,300 mg Oral TID after meals Valentino Felipe DO     • sulfamethoxazole-trimethoprim  2 tablet Oral [facility] Ana Rosa Whipple MD          Today, Patient Was Seen By: Mindy Walker MD    ** Please Note: Dictation voice to text software may have been used in the creation of this document   **

## 2023-05-13 NOTE — ASSESSMENT & PLAN NOTE
Sodium levels downtrending  Hydrohlothiazide held     · Fluid restriction 1500    Recent Labs     05/11/23  0438 05/12/23  0504 05/13/23  0907   SODIUM 129* 126* 129*     Results from last 7 days   Lab Units 05/12/23  0504 05/09/23  1630   OSMO UR mmol/KG  --  741   SODIUM UR   --  65  86   OSMOLALITY, SERUM mmol/*  --

## 2023-05-13 NOTE — ASSESSMENT & PLAN NOTE
40-year-old male presenting with cough, chest tightness, shortness of breath, and generalized weakness likely secondary to Pneomocystis Carinii Pneumonia (Diagnosed through bronchoscopy, BAL positive for this)  • Improved  Currently on room air  • Continue antibiotics per PCP as per ID recommendations

## 2023-05-14 NOTE — PLAN OF CARE
Problem: Potential for Falls  Goal: Patient will remain free of falls  Description: INTERVENTIONS:  - Educate patient/family on patient safety including physical limitations  - Instruct patient to call for assistance with activity   - Consult OT/PT to assist with strengthening/mobility   - Keep Call bell within reach  - Keep bed low and locked with side rails adjusted as appropriate  - Keep care items and personal belongings within reach  - Initiate and maintain comfort rounds  - Apply yellow socks and bracelet for high fall risk patients  - Consider moving patient to room near nurses station  Outcome: Progressing     Problem: MOBILITY - ADULT  Goal: Maintain or return to baseline ADL function  Description: INTERVENTIONS:  - Educate patient/family on patient safety including physical limitations  - Instruct patient to call for assistance with activity   - Consult OT/PT to assist with strengthening/mobility   - Keep Call bell within reach  - Keep bed low and locked with side rails adjusted as appropriate  - Keep care items and personal belongings within reach  - Initiate and maintain comfort rounds  - Initiate/Maintain bed alarm  - Obtain necessary fall risk management equipment:   - Apply yellow socks and bracelet for high fall risk patients  - Consider moving patient to room near nurses station  Outcome: Progressing  Goal: Maintains/Returns to pre admission functional level  Description: INTERVENTIONS:  - Perform BMAT or MOVE assessment daily    - Set and communicate daily mobility goal to care team and patient/family/caregiver     - Collaborate with rehabilitation services on mobility goals if consulted  - Out of bed for toileting  - Record patient progress and toleration of activity level   Outcome: Progressing     Problem: INFECTION - ADULT  Goal: Absence or prevention of progression during hospitalization  Description: INTERVENTIONS:  - Assess and monitor for signs and symptoms of infection  - Monitor lab/diagnostic results  - Monitor all insertion sites, i e  indwelling lines, tubes, and drains  - Monitor endotracheal if appropriate and nasal secretions for changes in amount and color  - [place] appropriate cooling/warming therapies per order  - Administer medications as ordered  - Instruct and encourage patient and family to use good hand hygiene technique  - Identify and instruct in appropriate isolation precautions for identified infection/condition  Outcome: Progressing  Goal: Absence of fever/infection during neutropenic period  Description: INTERVENTIONS:  - Monitor WBC    Outcome: Progressing     Problem: SAFETY ADULT  Goal: Patient will remain free of falls  Description: INTERVENTIONS:  - Educate patient/family on patient safety including physical limitations  - Instruct patient to call for assistance with activity   - Consult OT/PT to assist with strengthening/mobility   - Keep Call bell within reach  - Keep bed low and locked with side rails adjusted as appropriate  - Keep care items and personal belongings within reach  - Initiate and maintain comfort rounds  - Apply yellow socks and bracelet for high fall risk patients  - Consider moving patient to room near nurses station  Outcome: Progressing  Goal: Maintain or return to baseline ADL function  Description: INTERVENTIONS:  - Educate patient/family on patient safety including physical limitations  - Instruct patient to call for assistance with activity   - Consult OT/PT to assist with strengthening/mobility   - Keep Call bell within reach  - Keep bed low and locked with side rails adjusted as appropriate  - Keep care items and personal belongings within reach  - Initiate and maintain comfort rounds  - Initiate/Maintain bed alarm  - Obtain necessary fall risk management equipment:   - Apply yellow socks and bracelet for high fall risk patients  - Consider moving patient to room near nurses station  Outcome: Progressing  Goal: Maintains/Returns to pre admission functional level  Description: INTERVENTIONS:  - Perform BMAT or MOVE assessment daily    - Set and communicate daily mobility goal to care team and patient/family/caregiver     - Collaborate with rehabilitation services on mobility goals if consulted  - Out of bed for toileting  - Record patient progress and toleration of activity level   Outcome: Progressing     Problem: CARDIOVASCULAR - ADULT  Goal: Maintains optimal cardiac output and hemodynamic stability  Description: INTERVENTIONS:  - Monitor I/O, vital signs and rhythm  - Monitor for S/S and trends of decreased cardiac output  - Administer and titrate ordered vasoactive medications to optimize hemodynamic stability  - Assess quality of pulses, skin color and temperature  - Assess for signs of decreased coronary artery perfusion  - Instruct patient to report change in severity of symptoms  Outcome: Progressing  Goal: Absence of cardiac dysrhythmias or at baseline rhythm  Description: INTERVENTIONS:  - Continuous cardiac monitoring, vital signs, obtain 12 lead EKG if ordered  - Administer antiarrhythmic and heart rate control medications as ordered  - Monitor electrolytes and administer replacement therapy as ordered  Outcome: Progressing     Problem: RESPIRATORY - ADULT  Goal: Achieves optimal ventilation and oxygenation  Description: INTERVENTIONS:  - Assess for changes in respiratory status  - Assess for changes in mentation and behavior  - Position to facilitate oxygenation and minimize respiratory effort  - Oxygen administered by appropriate delivery if ordered  - Initiate smoking cessation education as indicated  - Encourage broncho-pulmonary hygiene including cough, deep breathe, Incentive Spirometry  - Assess the need for suctioning and aspirate as needed  - Assess and instruct to report SOB or any respiratory difficulty  - Respiratory Therapy support as indicated  Outcome: Progressing     Problem: GASTROINTESTINAL - ADULT  Goal: Minimal or absence of nausea and/or vomiting  Description: INTERVENTIONS:  - Administer IV fluids if ordered to ensure adequate hydration  - Maintain NPO status until nausea and vomiting are resolved  - Nasogastric tube if ordered  - Administer ordered antiemetic medications as needed  - Provide nonpharmacologic comfort measures as appropriate  - Advance diet as tolerated, if ordered  - Consider nutrition services referral to assist patient with adequate nutrition and appropriate food choices  Outcome: Progressing  Goal: Maintains or returns to baseline bowel function  Description: INTERVENTIONS:  - Assess bowel function  - Encourage oral fluids to ensure adequate hydration  - Administer IV fluids if ordered to ensure adequate hydration  - Administer ordered medications as needed  - Encourage mobilization and activity  - Consider nutritional services referral to assist patient with adequate nutrition and appropriate food choices  Outcome: Progressing  Goal: Maintains adequate nutritional intake  Description: INTERVENTIONS:  - Monitor percentage of each meal consumed  - Identify factors contributing to decreased intake, treat as appropriate  - Assist with meals as needed  - Monitor I&O, weight, and lab values if indicated  - Obtain nutrition services referral as needed  Outcome: Progressing  Goal: Establish and maintain optimal ostomy function  Description: INTERVENTIONS:  - Assess bowel function  - Encourage oral fluids to ensure adequate hydration  - Administer IV fluids if ordered to ensure adequate hydration   - Administer ordered medications as needed  - Encourage mobilization and activity  - Nutrition services referral to assist patient with appropriate food choices  - Assess stoma site  - Consider wound care consult   Outcome: Progressing  Goal: Oral mucous membranes remain intact  Description: INTERVENTIONS  - Assess oral mucosa and hygiene practices  - Implement preventative oral hygiene regimen  - Implement oral medicated treatments as ordered  - Initiate Nutrition services referral as needed  Outcome: Progressing     Problem: METABOLIC, FLUID AND ELECTROLYTES - ADULT  Goal: Electrolytes maintained within normal limits  Description: INTERVENTIONS:  - Monitor labs and assess patient for signs and symptoms of electrolyte imbalances  - Administer electrolyte replacement as ordered  - Monitor response to electrolyte replacements, including repeat lab results as appropriate  - Instruct patient on fluid and nutrition as appropriate  Outcome: Progressing  Goal: Fluid balance maintained  Description: INTERVENTIONS:  - Monitor labs   - Monitor I/O and WT  - Instruct patient on fluid and nutrition as appropriate  - Assess for signs & symptoms of volume excess or deficit  Outcome: Progressing  Goal: Glucose maintained within target range  Description: INTERVENTIONS:  - Monitor Blood Glucose as ordered  - Assess for signs and symptoms of hyperglycemia and hypoglycemia  - Administer ordered medications to maintain glucose within target range  - Assess nutritional intake and initiate nutrition service referral as needed  Outcome: Progressing     Problem: Prexisting or High Potential for Compromised Skin Integrity  Goal: Skin integrity is maintained or improved  Description: INTERVENTIONS:  - Identify patients at risk for skin breakdown  - Assess and monitor skin integrity  - Assess and monitor nutrition and hydration status  - Monitor labs   - Assess for incontinence   - Turn and reposition patient  - Assist with mobility/ambulation  - Relieve pressure over bony prominences  - Avoid friction and shearing  - Provide appropriate hygiene as needed including keeping skin clean and dry  - Evaluate need for skin moisturizer/barrier cream  - Collaborate with interdisciplinary team   - Patient/family teaching  - Consider wound care consult   Outcome: Progressing     Problem: Nutrition/Hydration-ADULT  Goal: Nutrient/Hydration intake appropriate for improving, restoring or maintaining nutritional needs  Description: Monitor and assess patient's nutrition/hydration status for malnutrition  Collaborate with interdisciplinary team and initiate plan and interventions as ordered  Monitor patient's weight and dietary intake as ordered or per policy  Utilize nutrition screening tool and intervene as necessary  Determine patient's food preferences and provide high-protein, high-caloric foods as appropriate       INTERVENTIONS:  - Monitor oral intake, urinary output, labs, and treatment plans  - Assess nutrition and hydration status and recommend course of action  - Evaluate amount of meals eaten  - Assist patient with eating if necessary   - Allow adequate time for meals  - Recommend/ encourage appropriate diets, oral nutritional supplements, and vitamin/mineral supplements  - Order, calculate, and assess calorie counts as needed  - Recommend, monitor, and adjust tube feedings and TPN/PPN based on assessed needs  - Assess need for intravenous fluids  - Provide specific nutrition/hydration education as appropriate  - Include patient/family/caregiver in decisions related to nutrition  Outcome: Progressing

## 2023-05-14 NOTE — PROGRESS NOTES
[address] [facility]  Progress Note  Name: Дмитрий Story  MRN: [id number]  Unit/Bed#: [facility] 68 2 -01 I Date of Admission: 4/30/2023   Date of Service: 5/14/2023 I Hospital Day: 14    Assessment/Plan   Hallucination  Assessment & Plan  Possible steroids, bactrim, or hyponatremia  Patient reports that hallucinations somehow improved  Will assess for further improvement now that the steroids and bactrim dose was decreased  Hyponatremia also improving   5/13-patient still reports approximately 2-3 episodes of visual hallucinations (flies in the air; bugs crawling on the wall, etc ) daily  As per ID and oncology notes, will attempt to wean dexamethasone to 2 mg every 12 hours tomorrow (infectious disease requesting equivalent of prednisone 20 starting 5/14; dexamethasone 2 mg every 12 hours would meet this and possibly help with neurological sequela )    Hyponatremia  Assessment & Plan  Sodium levels downtrending  Hydrohlothiazide held  · Fluid restriction 1500    Recent Labs     05/12/23  0504 05/13/23  0907 05/14/23  0537   SODIUM 126* 129* 128*     Results from last 7 days   Lab Units 05/12/23  0504 05/09/23  1630   OSMO UR mmol/KG  --  741   SODIUM UR   --  65  80   OSMOLALITY, SERUM mmol/*  --          Pneumocystis carinii pneumonia (HCC)  Assessment & Plan  Positive BAL for Pneumocystis carinii pneumonia  Risk factors in the includes steroid use in the setting of metastatic malignancy and possible BRAF therapy  · Antibiotic management per infectious disease  Previously on IV TMP-SMX and steroids, now on p o  Bactrim to complete 21-day course through 5/24/2023  · Infectious disease recommending 21 days of adjunctive corticosteroids for his hypoxia  Patient currently on chronic Decadron which they state will be sufficient for this purpose  History of Clostridium difficile infection  Assessment & Plan  Hx of diverticulitis, C   Diff in the past  C  Diff PCR positive, but negative toxins  • Continue to monitor off prophylaxis    Metastatic melanoma [facility])  Assessment & Plan  Biopsy confirmed 3/17/2023  Right peritoneum consistent with malignant melanoma with brain mets  BRAF mutation  · Encorafenib and Binimetinib started April 2023 (Currently on hold)    Acute kidney injury [facility])  Assessment & Plan  Possibly due to hydrochlorothiazide, bactrim  · Due to uptrending levels will have renal evaluate him  · Urinary retention protocol  · US kidney and bladder  · 5/12-appreciate nephrology recommendations, continue with Bactrim although elevated creatinine likely secondary to side effects of Bactrim antibiotics  Have started sodium bicarb tablets, creatinine trending down  Continue monitoring urine output  · 5/14-creatinine variable, slightly uptrending on today's labs  Appreciate nephrology recommendations, continue present therapy and monitor  Recent Labs     05/12/23  0504 05/13/23  0907 05/14/23  0537   BUN 36* 35* 44*   CREATININE 1 55* 1 62* 1 72*   EGFR 45 42 39       Results from last 7 days   Lab Units 05/11/23  1423 05/09/23  1630   BLOOD UA  Large*  --    PROTEIN UA mg/dl 70 (1+)*  --    SODIUM UR   --  65  86         Autoimmune hemolytic anemia  Assessment & Plan  History of warm antibody hemolytic anemia  Previously treated with Rituxan (8/2022 last received)  • Continue chronic steroid treatment with Dexamethasone  • Previous team spoke to hematology-oncology (Jesus Banks), May taper Dexamethasone from 4mg Q8 hours to Q12 hours due to side effects (Hallucinations)  (Dexamethasone is for the mets to his brain, previously on prednisone for the hemolytic anemia)  • Appreciate hematology-oncology recommendations      History of pulmonary embolism  Assessment & Plan  Maintained on Pradaxa    * Acute respiratory failure with hypoxia [facility])  Assessment & Plan  18-year-old male presenting with cough, chest tightness, shortness of breath, and generalized weakness likely secondary to Pneomocystis Carinii Pneumonia (Diagnosed through bronchoscopy, BAL positive for this)  • Improved  Currently on room air  • Continue antibiotics per PCP as per ID recommendations  VTE Pharmacologic Prophylaxis:   Pharmacologic: Dabigatran (Pradaxa)  Mechanical VTE Prophylaxis in Place: Yes    Patient Centered Rounds: I have performed bedside rounds with nursing staff today  Discussions with Specialists or Other Care Team Provider: Nephrology    Education and Discussions with Family / Patient: Discussed treatment plan with family and patient who agree with current plan; encouraged to ask questions and participate  Time Spent for Care: 45 minutes  More than 50% of total time spent on counseling and coordination of care as described above  Current Length of Stay: 14 day(s)    Current Patient Status: Inpatient   Certification Statement: The patient will continue to require additional inpatient hospital stay due to Treatment of PCP pneumonia and abdominal pain    Discharge Plan: To be determined    Code Status: Level 1 - Full Code      Subjective:   Patient seen and examined bedside, no acute distress but does complain of waxing and waning sharp right lower quadrant abdominal pain  Yesterday, was given simethicone for treatment of gas pain  But patient does not remember taking it so unclear if it offered any relief  We will repeat simethicone today; exam of the abdomen reveals abdominal distention  KUB initially ordered, but after a few hours image still not available  We will move forward with a stat CT abdomen pelvis with contrast as patient has history of C  difficile and worsening pain with white count spike overnight  Concern for appendicitis versus megacolon versus bowel perforation versus  bowel obstruction  Reports good bowel movements that are semisolid    Wife states that she is observed him to have diarrhea over the last couple days; patient reports that he has not?? Either way, on Bactrim for PCP treatment  Vancomycin prophylaxis held, according to ID, by pharmacy while patient in the ICU  Given the patient denies nathan diarrhea, will continue to hold vancomycin prophylaxis for now  Additionally, have decreased patient's dexamethasone to 2 mg twice daily  This is in treatment plan as outlined by both oncology and infectious disease  Patient reports today that hallucinations are only happening 1-2 times daily and anticipate resolution of hallucinations with decrease and dexamethasone  Appreciate nephrology recommendations, creatinine continues to be elevated, but will tolerate this in order to treat PCP pneumonia  Elevation in creatinine likely secondary to drug effects of that antibiotic  Continue Pradaxa for PE; anticipate improvement in hyponatremia as patient becomes more stable  Small dose Dilaudid for abdominal pain; may schedule  To go to short-term rehab once medically stable  Objective:     Vitals:   Temp (24hrs), Av 5 °F (36 9 °C), Min:97 8 °F (36 6 °C), Max:98 9 °F (37 2 °C)    Temp:  [97 8 °F (36 6 °C)-98 9 °F (37 2 °C)] 98 7 °F (37 1 °C)  HR:  [] 102  Resp:  [17-19] 19  BP: (133-145)/(80-93) 145/93  SpO2:  [91 %-94 %] 94 %  Body mass index is 29 36 kg/m²  Input and Output Summary (last 24 hours):        Intake/Output Summary (Last 24 hours) at 2023 1657  Last data filed at 2023 0639  Gross per 24 hour   Intake 150 ml   Output --   Net 150 ml       Physical Exam:     Physical Exam      Additional Data:     Labs:    Results from last 7 days   Lab Units 23  0537 23  0438   WBC Thousand/uL 23 02* 18 11*   HEMOGLOBIN g/dL 10 0* 12 6   HEMATOCRIT % 29 7* 38 0   PLATELETS Thousands/uL 521* 688*   BANDS PCT % 4  --    NEUTROS PCT %  --  72   LYMPHS PCT %  --  18   LYMPHO PCT % 9*  --    MONOS PCT %  --  6   MONO PCT % 3*  --    EOS PCT % 0 0     Results from last 7 days   Lab Units 23  0537   SODIUM mmol/L 128*   POTASSIUM mmol/L 4 4   CHLORIDE mmol/L 99   CO2 mmol/L 19*   BUN mg/dL 44*   CREATININE mg/dL 1 72*   ANION GAP mmol/L 10   CALCIUM mg/dL 8 8   ALBUMIN g/dL 3 4*   TOTAL BILIRUBIN mg/dL 0 67   ALK PHOS U/L 79   ALT U/L 31   AST U/L 26   GLUCOSE RANDOM mg/dL 112                           * I Have Reviewed All Lab Data Listed Above  * Additional Pertinent Lab Tests Reviewed:  All Labs Within Last 24 Hours Reviewed    Imaging:    Imaging Reports Reviewed Today Include: CT abdomen pelvis from earlier this admission  Imaging Personally Reviewed by Myself Includes:      Recent Cultures (last 7 days):           Last 24 Hours Medication List:   Current Facility-Administered Medications   Medication Dose Route Frequency Provider Last Rate   • acetaminophen  650 mg Oral Q6H PRN Merline Tay MD     • albuterol  2 puff Inhalation Q4H PRN Merline Tay MD     • ALPRAZolam  0 5 mg Oral HS PRN Merline Tay MD     • aluminum-magnesium hydroxide-simethicone  30 mL Oral Q6H PRN Merline Tay MD     • benzonatate  100 mg Oral TID PRN Merline Tay MD     • carbamide peroxide  5 drop Both Ears BID Merline Tay MD     • dabigatran etexilate  150 mg Oral Q12H [address] Merline Tay MD     • dexamethasone  2 mg Oral Q12H [address] Adiel Crespo MD     • dextromethorphan-guaiFENesin  10 mL Oral Q4H PRN Merline Tay MD     • memantine  10 mg Oral BID Merline Tay MD     • metoprolol succinate  12 5 mg Oral Daily Merline Tay MD     • ondansetron  4 mg Intravenous Q6H PRN Merline Tay MD     • pantoprazole  40 mg Oral Early Morning Merline Tay MD     • polyethylene glycol  17 g Oral Daily PRN Merline Tay MD     • prazosin  1 mg Oral HS VISHNU Barker     • simethicone  80 mg Oral 4x Daily (with meals and at bedtime) Adiel Crespo MD     • sodium bicarbonate  1,300 mg Oral TID after meals VISHNU Garner     • sulfamethoxazole-trimethoprim  2 tablet Oral [facility] Wells MD          Today, Patient Was Seen By: Kathy Falcon MD    ** Please Note: Dictation voice to text software may have been used in the creation of this document   **

## 2023-05-14 NOTE — ASSESSMENT & PLAN NOTE
Sodium levels downtrending  Hydrohlothiazide held     · Fluid restriction 1500    Recent Labs     05/12/23  0504 05/13/23  0907 05/14/23  0537   SODIUM 126* 129* 128*     Results from last 7 days   Lab Units 05/12/23  0504 05/09/23  1630   OSMO UR mmol/KG  --  741   SODIUM UR   --  65  86   OSMOLALITY, SERUM mmol/*  --

## 2023-05-14 NOTE — CONSULTS
Consultation - General Surgery  Bernard Solano 70 y o  male MRN: [id number]  Unit/Bed#: [facility] 68 2 [address] 216-01 Encounter: [id number]        Assessment/Plan     Assessment:  Bernard Solano is a 70 y o  male with a PMH metastatic melanoma, autoimmune hemolytic anemia on chronic steroid therapy, PE on pradaxa, who presents with pneumoperitoneum  Plan:  Extension discussion with patient, his wife, and sister-in-law  Patient without acute capacity  Discussion >1hr bedside regarding risks, benefits, and alternatives after additionally discussing with on-call Medical Oncologist regarding patient's overall prognosis  All questions addressed and answered  Surgical intervention with significant risks of morbidity and mortality in the background of metastatic melanoma  Risk of bleeding while on Pradaxa elevated, particularly if patient were to require blood transfusion due to his AI hemolytic anemia  Risk of infection while on chronic steroids and s/p chemotherapy elevated which ultimately would delay wound healing  Operative intervention would additionally pose a contraindication for acute continuation in cancer treatment  Given overall guarded prognosis, patient and family ultimately in agreement to forego operative intervention  Patient's wife states her husbands ultimate goal would be to return home  Discussed with Medicine team the resultant conversation  Recommend Palliative and Medical Oncology formal consults in the morning for further guided goals of care discussions  Medical team to further discuss with patient and family acute goals tonight (I e code status, goals of medical therapy)  History of Present Illness     HPI:  Bernard Solano is a 70 y o  male with a PMHx metastatic melanoma ad AI hemolytic anemia currently on steroid therapy per oncology team s/p radiation tx and briefly on chemotherapy, who p/w abdominal pain      Patient is currently admitted for pneumocystis carinii pneumonia and was complaining of abdominal pain today  Patient was being followed by ID and transitioned form TMP-SMX to PO bactrim for a 21 day course  A STAT CT scan was ordered due to patient's acute abdominal pain  Patient was found to have intraperitoneal air c/f perforated viscus on CT  STAT labs were ordered and are pending  Labs this morning with WBC 23 02 from 18 11, Hgb 10 from 12 6, platelets 175 from 294, na 128, cre 1 72  He was noted to have hallucinations throughout his hospitalization beginning 5/10 and does not appear to have capacity at this time  His wife was called at 7:30pm on 5/14 after surgery team evaluated patient, and stated that she will be coming in for further goals of care discussions  His hallucinations were thought to be 2/2 to steroid use vs hyponatremia  Additional medical history/comorbidities include: diverticulitis, autoimmune hemolytic anemia on chronic steroids, DVT, GERD, portal vein thrombosis, pulmonary emboli on Pradaxa, CDiff history, cholecystectomy, splenectomy  Review of Systems   Constitutional: Negative  HENT: Negative  Eyes: Negative  Respiratory: Negative  Cardiovascular: Negative  Gastrointestinal: Positive for abdominal distention and abdominal pain  Endocrine: Negative  Genitourinary: Negative  Musculoskeletal: Negative  Skin: Negative  Allergic/Immunologic: Negative  Neurological: Negative  Hematological: Negative  Psychiatric/Behavioral: Negative  All other systems reviewed and are negative        Historical Information   Past Medical History:   Diagnosis Date   • Acute diverticulitis 4/12/2023   • ARABELLA (acute kidney injury) ([facility] [address] ) 3/29/2018   • Anemia 11/02/2016   • Anxiety    • Arthritis    • Autoimmune hemolytic anemia ([facility] [address] )    • Claustrophobia    • COVID 12/2020   • DVT (deep venous thrombosis) ([facility])    • GERD (gastroesophageal reflux disease)    • Hearing loss, right    • Hemolytic anemia ([facility])    • History of transfusion 2018 - no adverse reaction   • Hypertension    • Malignant melanoma of leg, right ([address] ) 07/01/2022   • Palpitation    • Portal vein thrombosis    • PTSD (post-traumatic stress disorder)    • Pulmonary emboli (HCC)    • Squamous cell skin cancer 12/20/2022    SCCIS- 12/6/22   • Tobacco abuse      Past Surgical History:   Procedure Laterality Date   • CATARACT EXTRACTION Bilateral    • CHOLECYSTECTOMY  07/18/2017   • COLONOSCOPY     • ELBOW ARTHROPLASTY Left     bursectomy   • IR BIOPSY RETROPERITONEUM  3/17/2023   • IR DRAINAGE TUBE CHECK WITH SCLEROSIS  10/06/2022   • IR DRAINAGE TUBE CHECK WITH SCLEROSIS  10/10/2022   • IR DRAINAGE TUBE CHECK WITH SCLEROSIS  10/20/2022   • IR DRAINAGE TUBE CHECK WITH SCLEROSIS  10/27/2022   • IR DRAINAGE TUBE CHECK WITH SCLEROSIS  11/04/2022   • IR DRAINAGE TUBE CHECK WITH SCLEROSIS  11/15/2022   • IR DRAINAGE TUBE CHECK WITH SCLEROSIS  11/25/2022   • IR DRAINAGE TUBE PLACEMENT  09/19/2022   • JOINT REPLACEMENT Right 02/02/2021    knee   • KNEE SURGERY Right     meniscus tear   • LYMPH NODE BIOPSY Right 07/29/2022    Procedure: BIOPSY LYMPH NODE SENTINEL;  Surgeon: Shraddha Meléndez MD;  Location: [facility];  Service: Surgical Oncology   • MOHS SURGERY Right 12/20/2022    Right dorsal hand SCCIS-Dr Isabel   • IA ARTHRP KNE CONDYLE&PLATU MEDIAL&LAT COMPARTMENTS Right 02/02/2021    Procedure: ARTHROPLASTY KNEE TOTAL;  Surgeon: Lloyd Mathew MD;  Location[facility];  Service: Orthopedics   • IA ARTHRP KNE CONDYLE&PLATU MEDIAL&LAT COMPARTMENTS Left 11/17/2021    Procedure: TOTAL KNEE REPLACEMENT;  Surgeon: Lloyd Mathew MD;  [facility];  Service: Orthopedics   • IA LAPS SURG CHOLECYSTECTOMY [place] N/A 12/23/2017    Procedure: CHOLECYSTECTOMY LAPAROSCOPIC with cholangiogram;  Surgeon: Roxy Wiley MD;  [facility];  Service: General   • IA SPLENECTOMY TOTAL SEPARATE PROCEDURE N/A 05/18/2017    Procedure: LAPAROSCOPIC HAND ASSIST SPLENECTOMY;  Surgeon: Shawnee Rush MD;  [facility];  Service: Surgical Oncology   • SHOULDER SURGERY Left     rotator cuff x4, reconstruction   • SKIN BIOPSY  5 May 2022   • SKIN CANCER EXCISION  29 July 2022   • SKIN LESION EXCISION Right 07/29/2022    Procedure: WIDE EXCISION RIGHT MEDIAL THIGH;  Surgeon: Oneil Burkitt, MD;  Location: [facility];  Service: Surgical Oncology     Social History   Social History     Substance and Sexual Activity   Alcohol Use Yes   • Alcohol/week: 6 0 standard drinks   • Types: 6 Cans of beer per week    Comment: socially     Social History     Substance and Sexual Activity   Drug Use Yes   • Types: Marijuana    Comment: medical marijuana     Social History     Tobacco Use   Smoking Status Some Days   • Packs/day: 0 00   • Years: 5 00   • Pack years: 0 00   • Types: Cigars, Cigarettes   Smokeless Tobacco Current   • Types: Snuff   Tobacco Comments    5  a week average     Family History:   Family History   Problem Relation Age of Onset   • Cancer Mother    • Breast cancer Mother    • Other Father         CABG   • Heart attack Paternal Grandfather         acute MI       Meds/Allergies   PTA meds:   Prior to Admission Medications   Prescriptions Last Dose Informant Patient Reported? Taking?    ALPRAZolam (XANAX) 0 5 mg tablet Past Month  No Yes   Sig: Take 1 tablet (0 5 mg total) by mouth 2 (two) times a day Take one tablet one hour before scan and bring the second one with you to take right before the MRI if needed   VITAMIN D PO 4/30/2023  Yes Yes   Sig: Take 1,000 Units by mouth daily    acetaminophen (TYLENOL) 325 mg tablet Past Week  No Yes   Sig: Take 2 tablets (650 mg total) by mouth every 6 (six) hours as needed for mild pain   ascorbic acid (VITAMIN C) 1000 MG tablet   No No   Sig: Take 1 tablet (1,000 mg total) by mouth every 12 (twelve) hours for 6 doses   Patient taking differently: Take 1,000 mg by mouth daily Every other day   binimetinib (MEKTOVI) 15 MG tablet   Yes Yes   Sig: "Take 45 mg by mouth every 12 (twelve) hours   dabigatran etexilate (PRADAXA) 150 mg capsu 4/30/2023  No Yes   Sig: Take 1 capsule (150 mg total) by mouth every 12 (twelve) hours   dexamethasone (DECADRON) 4 mg tablet 4/30/2023  No Yes   Sig: Take 1 tablet (4 mg total) by mouth every 8 (eight) hours Please make sure you're on Protonix 40 mg daily for GI prophylaxis   encorafenib (BRAFTOVI) 50 MG capsule   Yes Yes   Sig: Take 450 mg by mouth daily   hydrochlorothiazide (HYDRODIURIL) 25 mg tablet 4/30/2023  No Yes   Sig: Take 1 tablet (25 mg total) by mouth daily   memantine (Namenda) 10 mg tablet 4/30/2023  No Yes   Sig: Take 1 tablet (10 mg total) by mouth 2 (two) times a day   metoprolol succinate (TOPROL-XL) 25 mg 24 hr tablet 4/30/2023  No Yes   Sig: Take 0 5 tablets (12 5 mg total) by mouth daily   ondansetron (ZOFRAN) 4 mg tablet Past Month  No Yes   Sig: Take 1 tablet (4 mg total) by mouth every 8 (eight) hours as needed for nausea or vomiting   pantoprazole (PROTONIX) 40 mg tablet 4/30/2023  No Yes   Sig: Take 1 tablet (40 mg total) by mouth daily   potassium chloride (K-DUR,KLOR-CON) 20 mEq tablet 4/30/2023  No Yes   Sig: Take 1 tablet (20 mEq total) by mouth daily   prazosin (MINIPRESS) 1 mg capsule 4/29/2023  Yes Yes   Sig: Take 1 mg by mouth daily at bedtime       Facility-Administered Medications: None     No Known Allergies    Objective   First Vitals:   Blood Pressure: 133/63 (04/30/23 1454)  Pulse: 104 (04/30/23 1454)  Temperature: 98 1 °F (36 7 °C) (04/30/23 1454)  Temp Source: Oral (04/30/23 1454)  Respirations: 19 (04/30/23 1454)  Height: 5' 8\" (172 7 cm) (04/30/23 2003)  Weight - Scale: 91 7 kg (202 lb 2 6 oz) (04/30/23 2003)  SpO2: 91 % (04/30/23 1454)    Current Vitals:   Blood Pressure: 145/93 (05/14/23 1507)  Pulse: 102 (05/14/23 1507)  Temperature: 98 7 °F (37 1 °C) (05/14/23 1507)  Temp Source: Oral (05/13/23 2121)  Respirations: 19 (05/14/23 1507)  Height: 5' 8\" (172 7 cm) (04/30/23 " 2003)  Weight - Scale: 87 6 kg (193 lb 2 oz) (05/02/23 1620)  SpO2: 94 % (05/14/23 1507)      Intake/Output Summary (Last 24 hours) at 5/14/2023 1948  Last data filed at 5/14/2023 0784  Gross per 24 hour   Intake 150 ml   Output --   Net 150 ml       Invasive Devices     Peripheral Intravenous Line  Duration           Peripheral IV 05/11/23 Proximal;Right;Ventral (anterior) Forearm 3 days                Physical Exam  Vitals reviewed  Constitutional:       Appearance: He is obese  He is ill-appearing  He is not toxic-appearing  HENT:      Head: Normocephalic and atraumatic  Right Ear: External ear normal       Left Ear: External ear normal       Nose: Nose normal       Mouth/Throat:      Mouth: Mucous membranes are dry  Pharynx: Oropharynx is clear  Eyes:      Extraocular Movements: Extraocular movements intact  Conjunctiva/sclera: Conjunctivae normal    Cardiovascular:      Rate and Rhythm: Tachycardia present  Pulmonary:      Effort: Pulmonary effort is normal  No respiratory distress  Comments: SpO2 high 80s in room ORA  Abdominal:      General: There is distension  Tenderness: There is abdominal tenderness  There is guarding and rebound  Comments: Diffuse peritonitis   Genitourinary:     Comments: deferred  Musculoskeletal:         General: Normal range of motion  Cervical back: Normal range of motion  Comments: B/l knees with healed surgical scars   Skin:     General: Skin is warm and dry     Neurological:      Comments: Confused, hallucinating during discussion         Lab Results:   CBC:   Lab Results   Component Value Date    WBC 28 11 (H) 05/14/2023    HGB 10 9 (L) 05/14/2023    HCT 32 7 (L) 05/14/2023     (H) 05/14/2023     (H) 05/14/2023    MCH 35 0 (H) 05/14/2023    MCHC 33 3 05/14/2023    RDW 15 0 05/14/2023    MPV 9 5 05/14/2023    NRBC 3 05/14/2023   , CMP:   Lab Results   Component Value Date    SODIUM 127 (L) 05/14/2023    K 4 7 05/14/2023 CL 96 05/14/2023    CO2 20 (L) 05/14/2023    BUN 51 (H) 05/14/2023    CREATININE 1 87 (H) 05/14/2023    CALCIUM 9 1 05/14/2023    AST 32 05/14/2023    ALT 35 05/14/2023    ALKPHOS 94 05/14/2023    EGFR 35 05/14/2023   , Coagulation:   Lab Results   Component Value Date    INR 1 35 (H) 05/14/2023   , Urinalysis: No results found for: Channie Loose, SPECGRAV, PHUR, LEUKOCYTESUR, NITRITE, PROTEINUA, GLUCOSEU, KETONESU, BILIRUBINUR, BLOODU, Amylase: No results found for: AMYLASE, Lipase: No results found for: LIPASE  Imaging: I have personally reviewed pertinent reports  EKG, Pathology, and Other Studies: I have personally reviewed pertinent reports        Code Status: Level 1 - Full Code  Advance Directive and Living Will:      Power of :    POLST:

## 2023-05-14 NOTE — ASSESSMENT & PLAN NOTE
42-year-old male presenting with cough, chest tightness, shortness of breath, and generalized weakness likely secondary to Pneomocystis Carinii Pneumonia (Diagnosed through bronchoscopy, BAL positive for this)  • Improved  Currently on room air  • Continue antibiotics per PCP as per ID recommendations

## 2023-05-14 NOTE — ASSESSMENT & PLAN NOTE
Possibly due to hydrochlorothiazide, bactrim  · Due to uptrending levels will have renal evaluate him  · Urinary retention protocol  ·  kidney and bladder  · 5/12-appreciate nephrology recommendations, continue with Bactrim although elevated creatinine likely secondary to side effects of Bactrim antibiotics  Have started sodium bicarb tablets, creatinine trending down  Continue monitoring urine output  · 5/14-creatinine variable, slightly uptrending on today's labs  Appreciate nephrology recommendations, continue present therapy and monitor      Recent Labs     05/12/23  0504 05/13/23  0907 05/14/23  0537   BUN 36* 35* 44*   CREATININE 1 55* 1 62* 1 72*   EGFR 45 42 39       Results from last 7 days   Lab Units 05/11/23  1423 05/09/23  1630   BLOOD UA  Large*  --    PROTEIN UA mg/dl 70 (1+)*  --    SODIUM UR   --  65  86

## 2023-05-14 NOTE — ASSESSMENT & PLAN NOTE
History of warm antibody hemolytic anemia  Previously treated with Rituxan (8/2022 last received)  • Continue chronic steroid treatment with Dexamethasone  • Previous team spoke to hematology-oncology (Renita Ibarra), May taper Dexamethasone from 4mg Q8 hours to Q12 hours due to side effects (Hallucinations)  (Dexamethasone is for the mets to his brain, previously on prednisone for the hemolytic anemia)  • Appreciate hematology-oncology recommendations

## 2023-05-14 NOTE — PROGRESS NOTES
Progress Note - Nephrology   George Chamberlain 70 y o  male MRN: [id number]  Unit/Bed#: [facility] 2 -01 Encounter: [id number]    ASSESSMENT and PLAN:  Acute kidney injury  Etiology: Suspect secondary to impaired creatinine secretion with from Bractim use along with prerenal component with thiazide  • Current creatinine 1 72-has been stable over the last 7 days  Workup:  • Urinalysis with micro reports:20-30 RBCs, 10-20 white cells noted uric acid crystals? Sulfonamide crystals  • Renal imaging revealed: No obstructive etiology seen on recent CT scan  Plan  • Avoid nephrotoxins, NSAIDs, and limit IV contrast if possible  • Avoid hypotension, perturbations of blood pressure to prevent decreased renal perfusion  • Adjust meds to appropriate GFR  • Optimize hemodynamic status to avoid delay in renal recovery  • Need accurate I/O and daily weights  • Likely creatinine to remain at this range in the setting of Bactrim use  • Monitor BMP daily     Hyponatremia  Etiology:  Suspect secondary to Dyazide use with possible SIADH given infection pain  • Current sodium 128-fairly stable  • Status post cortisol stimulation test appears to have a appropriate rise 17 7>10 7  • Avoid thiazides on discharge  • Continue with fluid restriction  • Work-up includes urine sodium 65, urine osmolality 741-just of of SIADH  • Continue to monitor BMP     Blood pressure/Hypertension:  • Current blood pressure:  Acceptable  • Current medications include: Toprol XL 12 5 mg daily  • Maximize hemodynamics to maintain MAP >65  • Avoid hypotension or fluctuations in blood pressure  • Will continue to trend     Acute respiratory failure with hypoxia-only improving     Pneumocystitis giovani pneumonia:  • History of metastatic malignancy is post brain radiation  • Antibiotic per infectious disease  • Currently on Bactrim-will need 21-day course through 5/24/2023  •  remains on corticosteroids     Autoimmune hemolytic anemia  • Previously treated with Rituxan in August 2022  • Remains on steroids with dexamethasone  • Hematology oncology following     History of C  difficile infection Remains on vancomycin oral for prophylaxis     History of malignant melanoma: With metastasis to brain lung and liver and bone  • Status post whole brain radiation  • Hematology/oncology as outpatient     History of pulmonary embolism-on Pradaxa  • Current hemoglobin  • Management per primary team  • Transfuse if hemoglobin less than 7 0     Non [place] Metabolic acidosis: Suspect secondary to previous IV fluids  • Sodium bicarbonate tablets started yesterday  • Bicarb improving at <19 22>16  • Resume 1300 mg 3 times daily with meals s  • BMP in a m  • Potassium improved      [facility] and care everywhere has been reviewed for this encounter      Review of systems  Patient seen and examined at bedside:  Review of Systems   Constitutional: Negative  Negative for activity change, appetite change, chills, diaphoresis, fatigue and fever  HENT: Negative  Negative for congestion and facial swelling  Respiratory: Negative  Cardiovascular: Negative  Gastrointestinal: Negative  Endocrine: Negative  Genitourinary: Negative  Musculoskeletal: Negative  Skin: Negative  Allergic/Immunologic: Negative  Neurological: Negative  Hematological: Negative  Psychiatric/Behavioral: Negative             Physical exam:  Current Weight: Weight - Scale: 87 6 kg (193 lb 2 oz)  Vitals:    05/13/23 0818 05/13/23 1620 05/13/23 2121 05/14/23 0758   BP: 137/93 142/74 142/84 133/80   BP Location:   Left arm    Pulse: 81 (!) 106 94 82   Resp: 20 20 18 17   Temp: (!) 97 4 °F (36 3 °C) 98 °F (36 7 °C) 98 9 °F (37 2 °C) 97 8 °F (36 6 °C)   TempSrc:   Oral    SpO2: 92% 92% 92% 91%   Weight:       Height:           Intake/Output Summary (Last 24 hours) at 5/14/2023 1348  Last data filed at 5/14/2023 0639  Gross per 24 hour   Intake 150 ml   Output --   Net 150 ml Physical Exam  Vitals reviewed  Constitutional:       Appearance: Normal appearance  HENT:      Head: Normocephalic and atraumatic  Nose: Nose normal       Mouth/Throat:      Mouth: Mucous membranes are moist       Pharynx: Oropharynx is clear  Eyes:      Extraocular Movements: Extraocular movements intact  Conjunctiva/sclera: Conjunctivae normal    Cardiovascular:      Rate and Rhythm: Normal rate and regular rhythm  Pulses: Normal pulses  Heart sounds: Normal heart sounds  Pulmonary:      Effort: Pulmonary effort is normal       Breath sounds: Normal breath sounds  Abdominal:      General: Bowel sounds are normal       Palpations: Abdomen is soft  Musculoskeletal:         General: Normal range of motion  Cervical back: Normal range of motion and neck supple  Skin:     General: Skin is warm and dry  Neurological:      General: No focal deficit present  Mental Status: He is alert and oriented to person, place, and time  Psychiatric:         Mood and Affect: Mood normal          Behavior: Behavior normal          Thought Content:  Thought content normal             Medications:    Current Facility-Administered Medications:   •  acetaminophen (TYLENOL) tablet 650 mg, 650 mg, Oral, Q6H PRN, Claudell Manning, MD, 650 mg at 05/13/23 2130  •  albuterol (PROVENTIL HFA,VENTOLIN HFA) inhaler 2 puff, 2 puff, Inhalation, Q4H PRN, Claudell Manning, MD, 2 puff at 05/05/23 1346  •  ALPRAZolam (XANAX) tablet 0 5 mg, 0 5 mg, Oral, HS PRN, Claudell Manning, MD  •  aluminum-magnesium hydroxide-simethicone (MYLANTA) oral suspension 30 mL, 30 mL, Oral, Q6H PRN, Claudell Manning, MD  •  benzonatate (TESSALON PERLES) capsule 100 mg, 100 mg, Oral, TID PRN, Litzy Paz MD, 100 mg at 04/30/23 2325  •  carbamide peroxide (DEBROX) 6 5 % otic solution 5 drop, 5 drop, Both Ears, BID, Claudell Manning, MD, 5 drop at 05/14/23 1000  •  dabigatran etexilate (PRADAXA) capsule 150 mg, 150 mg, Oral, Q12H [address], Sandy Bansal MD, 150 mg at 05/14/23 2081  •  dexamethasone (DECADRON) tablet 2 mg, 2 mg, Oral, Q12H [address], Patricio Arizmendi MD  •  dextromethorphan-guaiFENesin [facility] DM) oral syrup 10 mL, 10 mL, Oral, Q4H PRN, Tolu Paz MD, 10 mL at 05/01/23 1405  •  memantine (NAMENDA) tablet 10 mg, 10 mg, Oral, BID, Sandy Bansal MD, 10 mg at 05/14/23 0068  •  metoprolol succinate (TOPROL-XL) 24 hr tablet 12 5 mg, 12 5 mg, Oral, Daily, Tolu Paz MD, 12 5 mg at 05/14/23 0959  •  ondansetron (ZOFRAN) injection 4 mg, 4 mg, Intravenous, Q6H PRN, Sandy Bansal MD  •  pantoprazole (PROTONIX) EC tablet 40 mg, 40 mg, Oral, Early Morning, Sandy Bansal MD, 40 mg at 05/14/23 0536  •  polyethylene glycol (MIRALAX) packet 17 g, 17 g, Oral, Daily PRN, Sandy Bansal MD  •  prazosin (MINIPRESS) capsule 1 mg, 1 mg, Oral, HS, VISHNU Larry, 1 mg at 05/13/23 2124  •  simethicone (MYLICON) chewable tablet 80 mg, 80 mg, Oral, 4x Daily (with meals and at bedtime), Patricio Arizmendi MD  •  sodium bicarbonate tablet 1,300 mg, 1,300 mg, Oral, TID after meals, VISHNU Tristan, 1,300 mg at 05/14/23 1239  •  sulfamethoxazole-trimethoprim (BACTRIM DS) 800-160 mg per tablet 2 tablet, 2 tablet, Oral, Q8H [address], Sumaya Wright MD, 2 tablet at 05/14/23 0536    Laboratory Results:  Results from last 7 days   Lab Units 05/14/23  0537 05/13/23  0907 05/12/23  0504 05/11/23  0438 05/10/23  0458 05/09/23  0604 05/08/23  0427   WBC Thousand/uL 23 02*  --   --  18 11* 18 10* 18 95* 16 64*   HEMOGLOBIN g/dL 10 0*  --   --  12 6 11 0* 11 5* 10 6*   HEMATOCRIT % 29 7*  --   --  38 0 32 0* 33 6* 31 2*   PLATELETS Thousands/uL 521*  --   --  688* 609* 547* 497*   SODIUM mmol/L 128* 129* 126* 129* 125* 126* 126*   POTASSIUM mmol/L 4 4 4 4 5 0 4 3 4 5 4 3 4 4   CHLORIDE mmol/L 99 98 99 94* 94* 93* 93*   CO2 mmol/L 19* 22 16* 21 "21 22 22   BUN mg/dL 44* 35* 36* 36* 32* 26* 24   CREATININE mg/dL 1 72* 1 62* 1 55* 1 71* 1 62* 1 53* 1 49*   CALCIUM mg/dL 8 8 9 1 8 7 9 7 9 1 9 2 9 1   MAGNESIUM mg/dL 2 2  --   --  2 2 1 9 1 9 1 7*   PHOSPHORUS mg/dL 4 4*  --   --   --   --   --   --            Portions of the record may have been created with voice recognition software  Occasional wrong word or \"sound a like\" substitutions may have occurred due to the inherent limitations of voice recognition software   Read the chart carefully and recognize,   "

## 2023-05-15 PROBLEM — R19.8 PERFORATION OF VISCUS: Status: ACTIVE | Noted: 2023-01-01

## 2023-05-15 NOTE — TREATMENT PLAN
Infectious Diseases Chart Note:  - patient found to have new bowel perforation with free air on CT  - patient and family now discussing transition to hospice care  - for now, PO medications being avoided  - until hospice confirmed, will resume treatment for Pneumocystis pneumonia; Patient had been on PO Bactrim DS, two tablets TID   IV equivalent will be 960 mg of Trimethoprim divided every 8 hours = 320 mg every 8 hours  - CrCl currently > 30    Plan and recommendations were discussed with primary team     Toby Carter MD

## 2023-05-15 NOTE — QUICK NOTE
Notified by surgery resident she had an extensive discussion with patient and spouse regarding pneumoperitoneum and surgical risks given his complicated medical conditions  Patient's spouse wants to discuss comfort care/hospice    · Discussed goals of care overnight with patient and spouse Sandy Harshad at bedside  · She confirmed DNR/DNI although would like further information about hospice at home  · Palliative care consult explained; she would like to meet with palliative care specialist in a m   Her goal is to bring him home so that he can go comfortably with family and his dogs

## 2023-05-15 NOTE — ASSESSMENT & PLAN NOTE
· Patient had a CAT scan done due to abdominal distention    Noted to have pneumoperitoneum concerning for perforation of the viscus-  · Surgery evaluation appreciated-patient is not a good surgical candidate-currently on Zosyn  · Had a discussion with surgery palliative and hematology oncology today-hospice will be the best course of action  · Hospice evaluation pending  · Family would like the patient to be home with home hospice

## 2023-05-15 NOTE — ASSESSMENT & PLAN NOTE
History of warm antibody hemolytic anemia  Previously treated with Rituxan (8/2022 last received)  • Continue chronic steroid treatment with Dexamethasone  • Previous team spoke to hematology-oncology (Angie Moctezuma), May taper Dexamethasone from 4mg Q8 hours to Q12 hours due to side effects (Hallucinations)  (Dexamethasone is for the mets to his brain, previously on prednisone for the hemolytic anemia)  • Appreciate hematology-oncology recommendations

## 2023-05-15 NOTE — ACP (ADVANCE CARE PLANNING)
Advanced Care Planning Progress Note    Serious Illness Conversation    1  What is your understanding now of where you are with your illness? Prognostic Understanding: appropriate understanding of prognosis     2  How much information about what is likely to be ahead with your illness would you like to have? Information: patient wants to be fully informed  Spouse would like to be fully informed     3  What did you (clinician) communicate to the patient? Prognostic Communication: Function - I hope that this is not the case, but I’m worried that this may be as strong as you will feel, and things are likely to get more difficult  4  If your health situation worsens, what are your most important goals? Goals: be at home     5  What are the biggest fears and worries about the future and your health? Fears/Worries: being dependent, loss of control     6  What abilities are so critical to your life that you cannot imagine living without them? Unacceptable Function: being in pain or very uncomfortable     7  What gives you strength as you think about the future with your illness? 8  If you become sicker, how much are you willing to go through for the possibility of gaining more time? Have a feeding tube: No   Be in the ICU: No Live in a nursing home: No   Be on a ventilator: No Be uncomfortable: No   Be on dialysis: No Undergo aggressive test and/or procedures: No   9  How much does your proxy and family know about your priorities and wishes? Discussion Discussion: extensive discussion with family about goals and wishes        How does this plan sound to you? I will do everything I can to help you through this  Spouse goal is to bring him home so that he can be with family and his dogs at the time of his death      I have spent 20 minutes speaking with patient and spouse on advanced care planning today or during this visit     Advanced directives         Briscoe Mortimer

## 2023-05-15 NOTE — PROGRESS NOTES
Discussed with Dr Gala Holliday  Patient is being evaluated by palliative care and considering home with hospice  At this point we will see the patient as needed sodium level has improved  Please call with any questions or concerns

## 2023-05-15 NOTE — ASSESSMENT & PLAN NOTE
Positive BAL for Pneumocystis carinii pneumonia  Risk factors in the includes steroid use in the setting of metastatic malignancy and possible BRAF therapy  · Antibiotic management per infectious disease  Previously on IV TMP-SMX and steroids, now on p o  Bactrim to complete 21-day course through 5/24/2023  · Infectious disease recommending 21 days of adjunctive corticosteroids for his hypoxia  Patient currently on chronic Decadron  · Patient is NPO    Will change to IV Bactrim

## 2023-05-15 NOTE — PLAN OF CARE
Problem: Potential for Falls  Goal: Patient will remain free of falls  Description: INTERVENTIONS:  - Educate patient/family on patient safety including physical limitations  - Instruct patient to call for assistance with activity   - Consult OT/PT to assist with strengthening/mobility   - Keep Call bell within reach  - Keep bed low and locked with side rails adjusted as appropriate  - Keep care items and personal belongings within reach  - Initiate and maintain comfort rounds  - Apply yellow socks and bracelet for high fall risk patients  - Consider moving patient to room near nurses station  Outcome: Progressing     Problem: MOBILITY - ADULT  Goal: Maintain or return to baseline ADL function  Description: INTERVENTIONS:  - Educate patient/family on patient safety including physical limitations  - Instruct patient to call for assistance with activity   - Consult OT/PT to assist with strengthening/mobility   - Keep Call bell within reach  - Keep bed low and locked with side rails adjusted as appropriate  - Keep care items and personal belongings within reach  - Initiate and maintain comfort rounds  - Initiate/Maintain bed alarm  - Obtain necessary fall risk management equipment:   - Apply yellow socks and bracelet for high fall risk patients  - Consider moving patient to room near nurses station  Outcome: Progressing  Goal: Maintains/Returns to pre admission functional level  Description: INTERVENTIONS:  - Perform BMAT or MOVE assessment daily    - Set and communicate daily mobility goal to care team and patient/family/caregiver     - Collaborate with rehabilitation services on mobility goals if consulted  - Out of bed for toileting  - Record patient progress and toleration of activity level   Outcome: Progressing     Problem: INFECTION - ADULT  Goal: Absence or prevention of progression during hospitalization  Description: INTERVENTIONS:  - Assess and monitor for signs and symptoms of infection  - Monitor lab/diagnostic results  - Monitor all insertion sites, i e  indwelling lines, tubes, and drains  - Monitor endotracheal if appropriate and nasal secretions for changes in amount and color  - [place] appropriate cooling/warming therapies per order  - Administer medications as ordered  - Instruct and encourage patient and family to use good hand hygiene technique  - Identify and instruct in appropriate isolation precautions for identified infection/condition  Outcome: Progressing  Goal: Absence of fever/infection during neutropenic period  Description: INTERVENTIONS:  - Monitor WBC    Outcome: Progressing     Problem: SAFETY ADULT  Goal: Patient will remain free of falls  Description: INTERVENTIONS:  - Educate patient/family on patient safety including physical limitations  - Instruct patient to call for assistance with activity   - Consult OT/PT to assist with strengthening/mobility   - Keep Call bell within reach  - Keep bed low and locked with side rails adjusted as appropriate  - Keep care items and personal belongings within reach  - Initiate and maintain comfort rounds  - Apply yellow socks and bracelet for high fall risk patients  - Consider moving patient to room near nurses station  Outcome: Progressing  Goal: Maintain or return to baseline ADL function  Description: INTERVENTIONS:  - Educate patient/family on patient safety including physical limitations  - Instruct patient to call for assistance with activity   - Consult OT/PT to assist with strengthening/mobility   - Keep Call bell within reach  - Keep bed low and locked with side rails adjusted as appropriate  - Keep care items and personal belongings within reach  - Initiate and maintain comfort rounds  - Initiate/Maintain bed alarm  - Obtain necessary fall risk management equipment:   - Apply yellow socks and bracelet for high fall risk patients  - Consider moving patient to room near nurses station  Outcome: Progressing  Goal: Maintains/Returns to pre admission functional level  Description: INTERVENTIONS:  - Perform BMAT or MOVE assessment daily    - Set and communicate daily mobility goal to care team and patient/family/caregiver     - Collaborate with rehabilitation services on mobility goals if consulted  - Out of bed for toileting  - Record patient progress and toleration of activity level   Outcome: Progressing     Problem: CARDIOVASCULAR - ADULT  Goal: Maintains optimal cardiac output and hemodynamic stability  Description: INTERVENTIONS:  - Monitor I/O, vital signs and rhythm  - Monitor for S/S and trends of decreased cardiac output  - Administer and titrate ordered vasoactive medications to optimize hemodynamic stability  - Assess quality of pulses, skin color and temperature  - Assess for signs of decreased coronary artery perfusion  - Instruct patient to report change in severity of symptoms  Outcome: Progressing  Goal: Absence of cardiac dysrhythmias or at baseline rhythm  Description: INTERVENTIONS:  - Continuous cardiac monitoring, vital signs, obtain 12 lead EKG if ordered  - Administer antiarrhythmic and heart rate control medications as ordered  - Monitor electrolytes and administer replacement therapy as ordered  Outcome: Progressing     Problem: RESPIRATORY - ADULT  Goal: Achieves optimal ventilation and oxygenation  Description: INTERVENTIONS:  - Assess for changes in respiratory status  - Assess for changes in mentation and behavior  - Position to facilitate oxygenation and minimize respiratory effort  - Oxygen administered by appropriate delivery if ordered  - Initiate smoking cessation education as indicated  - Encourage broncho-pulmonary hygiene including cough, deep breathe, Incentive Spirometry  - Assess the need for suctioning and aspirate as needed  - Assess and instruct to report SOB or any respiratory difficulty  - Respiratory Therapy support as indicated  Outcome: Progressing     Problem: GASTROINTESTINAL - ADULT  Goal: Minimal or absence of nausea and/or vomiting  Description: INTERVENTIONS:  - Administer IV fluids if ordered to ensure adequate hydration  - Maintain NPO status until nausea and vomiting are resolved  - Nasogastric tube if ordered  - Administer ordered antiemetic medications as needed  - Provide nonpharmacologic comfort measures as appropriate  - Advance diet as tolerated, if ordered  - Consider nutrition services referral to assist patient with adequate nutrition and appropriate food choices  Outcome: Progressing  Goal: Maintains or returns to baseline bowel function  Description: INTERVENTIONS:  - Assess bowel function  - Encourage oral fluids to ensure adequate hydration  - Administer IV fluids if ordered to ensure adequate hydration  - Administer ordered medications as needed  - Encourage mobilization and activity  - Consider nutritional services referral to assist patient with adequate nutrition and appropriate food choices  Outcome: Progressing  Goal: Maintains adequate nutritional intake  Description: INTERVENTIONS:  - Monitor percentage of each meal consumed  - Identify factors contributing to decreased intake, treat as appropriate  - Assist with meals as needed  - Monitor I&O, weight, and lab values if indicated  - Obtain nutrition services referral as needed  Outcome: Progressing  Goal: Establish and maintain optimal ostomy function  Description: INTERVENTIONS:  - Assess bowel function  - Encourage oral fluids to ensure adequate hydration  - Administer IV fluids if ordered to ensure adequate hydration   - Administer ordered medications as needed  - Encourage mobilization and activity  - Nutrition services referral to assist patient with appropriate food choices  - Assess stoma site  - Consider wound care consult   Outcome: Progressing  Goal: Oral mucous membranes remain intact  Description: INTERVENTIONS  - Assess oral mucosa and hygiene practices  - Implement preventative oral hygiene regimen  - Implement oral medicated treatments as ordered  - Initiate Nutrition services referral as needed  Outcome: Progressing     Problem: RESPIRATORY - ADULT  Goal: Achieves optimal ventilation and oxygenation  Description: INTERVENTIONS:  - Assess for changes in respiratory status  - Assess for changes in mentation and behavior  - Position to facilitate oxygenation and minimize respiratory effort  - Oxygen administered by appropriate delivery if ordered  - Initiate smoking cessation education as indicated  - Encourage broncho-pulmonary hygiene including cough, deep breathe, Incentive Spirometry  - Assess the need for suctioning and aspirate as needed  - Assess and instruct to report SOB or any respiratory difficulty  - Respiratory Therapy support as indicated  Outcome: Progressing     Problem: GASTROINTESTINAL - ADULT  Goal: Minimal or absence of nausea and/or vomiting  Description: INTERVENTIONS:  - Administer IV fluids if ordered to ensure adequate hydration  - Maintain NPO status until nausea and vomiting are resolved  - Nasogastric tube if ordered  - Administer ordered antiemetic medications as needed  - Provide nonpharmacologic comfort measures as appropriate  - Advance diet as tolerated, if ordered  - Consider nutrition services referral to assist patient with adequate nutrition and appropriate food choices  Outcome: Progressing  Goal: Maintains or returns to baseline bowel function  Description: INTERVENTIONS:  - Assess bowel function  - Encourage oral fluids to ensure adequate hydration  - Administer IV fluids if ordered to ensure adequate hydration  - Administer ordered medications as needed  - Encourage mobilization and activity  - Consider nutritional services referral to assist patient with adequate nutrition and appropriate food choices  Outcome: Progressing  Goal: Maintains adequate nutritional intake  Description: INTERVENTIONS:  - Monitor percentage of each meal consumed  - Identify factors contributing to decreased intake, treat as appropriate  - Assist with meals as needed  - Monitor I&O, weight, and lab values if indicated  - Obtain nutrition services referral as needed  Outcome: Progressing  Goal: Establish and maintain optimal ostomy function  Description: INTERVENTIONS:  - Assess bowel function  - Encourage oral fluids to ensure adequate hydration  - Administer IV fluids if ordered to ensure adequate hydration   - Administer ordered medications as needed  - Encourage mobilization and activity  - Nutrition services referral to assist patient with appropriate food choices  - Assess stoma site  - Consider wound care consult   Outcome: Progressing  Goal: Oral mucous membranes remain intact  Description: INTERVENTIONS  - Assess oral mucosa and hygiene practices  - Implement preventative oral hygiene regimen  - Implement oral medicated treatments as ordered  - Initiate Nutrition services referral as needed  Outcome: Progressing     Problem: METABOLIC, FLUID AND ELECTROLYTES - ADULT  Goal: Electrolytes maintained within normal limits  Description: INTERVENTIONS:  - Monitor labs and assess patient for signs and symptoms of electrolyte imbalances  - Administer electrolyte replacement as ordered  - Monitor response to electrolyte replacements, including repeat lab results as appropriate  - Instruct patient on fluid and nutrition as appropriate  Outcome: Progressing  Goal: Fluid balance maintained  Description: INTERVENTIONS:  - Monitor labs   - Monitor I/O and WT  - Instruct patient on fluid and nutrition as appropriate  - Assess for signs & symptoms of volume excess or deficit  Outcome: Progressing  Goal: Glucose maintained within target range  Description: INTERVENTIONS:  - Monitor Blood Glucose as ordered  - Assess for signs and symptoms of hyperglycemia and hypoglycemia  - Administer ordered medications to maintain glucose within target range  - Assess nutritional intake and initiate nutrition service referral as needed  Outcome: Progressing     Problem: Prexisting or High Potential for Compromised Skin Integrity  Goal: Skin integrity is maintained or improved  Description: INTERVENTIONS:  - Identify patients at risk for skin breakdown  - Assess and monitor skin integrity  - Assess and monitor nutrition and hydration status  - Monitor labs   - Assess for incontinence   - Turn and reposition patient  - Assist with mobility/ambulation  - Relieve pressure over bony prominences  - Avoid friction and shearing  - Provide appropriate hygiene as needed including keeping skin clean and dry  - Evaluate need for skin moisturizer/barrier cream  - Collaborate with interdisciplinary team   - Patient/family teaching  - Consider wound care consult   Outcome: Progressing     Problem: Nutrition/Hydration-ADULT  Goal: Nutrient/Hydration intake appropriate for improving, restoring or maintaining nutritional needs  Description: Monitor and assess patient's nutrition/hydration status for malnutrition  Collaborate with interdisciplinary team and initiate plan and interventions as ordered  Monitor patient's weight and dietary intake as ordered or per policy  Utilize nutrition screening tool and intervene as necessary  Determine patient's food preferences and provide high-protein, high-caloric foods as appropriate       INTERVENTIONS:  - Monitor oral intake, urinary output, labs, and treatment plans  - Assess nutrition and hydration status and recommend course of action  - Evaluate amount of meals eaten  - Assist patient with eating if necessary   - Allow adequate time for meals  - Recommend/ encourage appropriate diets, oral nutritional supplements, and vitamin/mineral supplements  - Order, calculate, and assess calorie counts as needed  - Recommend, monitor, and adjust tube feedings and TPN/PPN based on assessed needs  - Assess need for intravenous fluids  - Provide specific nutrition/hydration education as appropriate  - Include patient/family/caregiver in decisions related to nutrition  Outcome: Progressing

## 2023-05-15 NOTE — ASSESSMENT & PLAN NOTE
65-year-old male presenting with cough, chest tightness, shortness of breath, and generalized weakness likely secondary to Pneomocystis Carinii Pneumonia (Diagnosed through bronchoscopy, BAL positive for this)  • Improved  Currently on room air  • Continue antibiotics per PCP as per ID recommendations

## 2023-05-15 NOTE — CASE MANAGEMENT
Case Management Discharge Planning Note    Patient name Jone Ryan  Location [facility] [place] [facility] 2 Faiza Gomez* MRN [id number]  : 1955 Date 5/15/2023       Current Admission Date: 2023  Current Admission Diagnosis:Acute respiratory failure with hypoxia [facility])   Patient Active Problem List    Diagnosis Date Noted   • Hallucination 05/10/2023   • Hyponatremia 2023   • Pneumocystis carinii pneumonia ([facility] [address] ) 2023   • Ground glass opacity present on imaging of lung 2023   • Generalized weakness 2023   • History of Clostridium difficile infection 2023   • Melanoma ([facility] [address] ) 2023   • Encounter for follow-up surveillance of melanoma 2023   • Lymphocele after surgical procedure 10/06/2022   • Elevated serum creatinine 2022   • Elevated bilirubin 2022   • Leukocytosis 2022   • Dyspnea 2022   • Acute respiratory failure with hypoxia ([facility] [address] ) 2022   • Metastatic melanoma ([facility] ) 2022   • Hypercholesterolemia 2021   • Chronic bilateral low back pain with bilateral sciatica 10/08/2021   • Hyperglycemia 2021   • Primary localized osteoarthrosis of the knee, right 2021   • Thrombocytosis 2021   • COVID-19 2020   • Pain in joint, lower leg 11/10/2020   • Primary osteoarthritis of left knee 2020   • Pain in left knee 2020   • Auto immune neutropenia ([facility]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   • Glucose intolerance (impaired glucose tolerance) 2020   • Renal insufficiency 2020   • Essential hypertension 2019   • Posttraumatic stress disorder 10/28/2019   • Iron overload due to repeated red blood cell transfusions 2018   • Sepsis ([address] ) 2018   • Acute kidney injury ([facility] ) 2018   • Autoimmune hemolytic anemia    • Shortness of breath 2017   • Gastroesophageal reflux disease 2017   • Elevated blood pressure reading without diagnosis of hypertension 2017   • Portal vein thrombosis 2017 "  • History of pulmonary embolism 06/06/2017   • Splenomegaly 05/02/2017   • Symptomatic anemia 02/03/2017   • 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   • Hyperbilirubinemia 11/02/2016      LOS (days): 15  Geometric Mean LOS (GMLOS) (days): 5 20  Days to GMLOS:-9 5     OBJECTIVE:  Risk of Unplanned Readmission Score: 38 35         Current admission status: Inpatient   [id number] [address] [address]  [address]  Phone: [phone] Fax: [phone]    Primary Care Provider: Alex Abdi DO    Primary Insurance: MEDICARE  Secondary Insurance: AARP    DISCHARGE DETAILS:    Discharge planning discussed with[de-identified] pt's wife Zane Banuelos of Choice: Yes  Comments - [place] of Choice: [facility] (home which pt would truly like to pass at home w/ family/dogs) vs inpatient vs home w/ VNA transition to hospice at later time- either way would like [facility] services to keep within network  Referral made to [facility]- wife asks for information re: hospice care at both levels of care- made as requested  CM contacted family/caregiver?: Yes  Were Treatment Team discharge recommendations reviewed with patient/caregiver?: Yes  Did patient/caregiver verbalize understanding of patient care needs?: Yes       Contacts  Patient Contacts: Holly Talley  Relationship to Patient[de-identified] Family  Contact Method: Phone  Phone Number: [phone]  Reason/Outcome: Referral, Discharge Planning    [facility]         Is the patient interested in [facility] at discharge? :  (pending hospice liaison conversation)    DME Referral Provided  Referral made for DME?:  (Wife states is coming home woulnd need Hospital Bed- informed hospice would assist with \"chucks\" for bed if going that route with a few days to be offered if VNA itself (can order off [employer] otherwise)    Offered [facility] lift however not enough room in home)         Would you like to participate in " our [address] service program?  : No - Declined    Treatment Team Recommendation: Hospice  Discharge Destination Plan[de-identified]  (TBD)

## 2023-05-15 NOTE — PLAN OF CARE
Problem: Potential for Falls  Goal: Patient will remain free of falls  Description: INTERVENTIONS:  - Educate patient/family on patient safety including physical limitations  - Instruct patient to call for assistance with activity   - Consult OT/PT to assist with strengthening/mobility   - Keep Call bell within reach  - Keep bed low and locked with side rails adjusted as appropriate  - Keep care items and personal belongings within reach  - Initiate and maintain comfort rounds  - Apply yellow socks and bracelet for high fall risk patients  - Consider moving patient to room near nurses station  Outcome: Progressing     Problem: MOBILITY - ADULT  Goal: Maintain or return to baseline ADL function  Description: INTERVENTIONS:  - Educate patient/family on patient safety including physical limitations  - Instruct patient to call for assistance with activity   - Consult OT/PT to assist with strengthening/mobility   - Keep Call bell within reach  - Keep bed low and locked with side rails adjusted as appropriate  - Keep care items and personal belongings within reach  - Initiate and maintain comfort rounds  - Initiate/Maintain bed alarm  - Obtain necessary fall risk management equipment:   - Apply yellow socks and bracelet for high fall risk patients  - Consider moving patient to room near nurses station  Outcome: Progressing  Goal: Maintains/Returns to pre admission functional level  Description: INTERVENTIONS:  - Perform BMAT or MOVE assessment daily    - Set and communicate daily mobility goal to care team and patient/family/caregiver     - Collaborate with rehabilitation services on mobility goals if consulted  - Out of bed for toileting  - Record patient progress and toleration of activity level   Outcome: Progressing     Problem: GASTROINTESTINAL - ADULT  Goal: Minimal or absence of nausea and/or vomiting  Description: INTERVENTIONS:  - Administer IV fluids if ordered to ensure adequate hydration  - Maintain NPO status until nausea and vomiting are resolved  - Nasogastric tube if ordered  - Administer ordered antiemetic medications as needed  - Provide nonpharmacologic comfort measures as appropriate  - Advance diet as tolerated, if ordered  - Consider nutrition services referral to assist patient with adequate nutrition and appropriate food choices  Outcome: Progressing  Goal: Maintains or returns to baseline bowel function  Description: INTERVENTIONS:  - Assess bowel function  - Encourage oral fluids to ensure adequate hydration  - Administer IV fluids if ordered to ensure adequate hydration  - Administer ordered medications as needed  - Encourage mobilization and activity  - Consider nutritional services referral to assist patient with adequate nutrition and appropriate food choices  Outcome: Progressing  Goal: Maintains adequate nutritional intake  Description: INTERVENTIONS:  - Monitor percentage of each meal consumed  - Identify factors contributing to decreased intake, treat as appropriate  - Assist with meals as needed  - Monitor I&O, weight, and lab values if indicated  - Obtain nutrition services referral as needed  Outcome: Progressing  Goal: Establish and maintain optimal ostomy function  Description: INTERVENTIONS:  - Assess bowel function  - Encourage oral fluids to ensure adequate hydration  - Administer IV fluids if ordered to ensure adequate hydration   - Administer ordered medications as needed  - Encourage mobilization and activity  - Nutrition services referral to assist patient with appropriate food choices  - Assess stoma site  - Consider wound care consult   Outcome: Progressing  Goal: Oral mucous membranes remain intact  Description: INTERVENTIONS  - Assess oral mucosa and hygiene practices  - Implement preventative oral hygiene regimen  - Implement oral medicated treatments as ordered  - Initiate Nutrition services referral as needed  Outcome: Progressing

## 2023-05-15 NOTE — PROGRESS NOTES
[address] [facility]  Progress Note  Name: Uri Bess  MRN: [id number]  Unit/Bed#: [place] 2 -01 I Date of Admission: 4/30/2023   Date of Service: 5/15/2023 I Hospital Day: 15    Assessment/Plan   Perforation of viscus  Assessment & Plan  · Patient had a CAT scan done due to abdominal distention  Noted to have pneumoperitoneum concerning for perforation of the viscus-  · Surgery evaluation appreciated-patient is not a good surgical candidate-currently on Zosyn  · Had a discussion with surgery palliative and hematology oncology today-hospice will be the best course of action  · Hospice evaluation pending  · Family would like the patient to be home with home hospice    Hyponatremia  Assessment & Plan  Sodium levels downtrending  Hydrohlothiazide held  · Fluid restriction 1500    Recent Labs     05/13/23  0907 05/14/23  0537 05/14/23  2029 05/15/23  0434   SODIUM 129* 128* 127* 130*     Results from last 7 days   Lab Units 05/12/23  0504 05/09/23  1630   OSMO UR mmol/KG  --  741   SODIUM UR   --  65  80   OSMOLALITY, SERUM mmol/*  --          Pneumocystis carinii pneumonia (HCC)  Assessment & Plan  Positive BAL for Pneumocystis carinii pneumonia  Risk factors in the includes steroid use in the setting of metastatic malignancy and possible BRAF therapy  · Antibiotic management per infectious disease  Previously on IV TMP-SMX and steroids, now on p o  Bactrim to complete 21-day course through 5/24/2023  · Infectious disease recommending 21 days of adjunctive corticosteroids for his hypoxia  Patient currently on chronic Decadron  · Patient is NPO  Will change to IV Bactrim    History of Clostridium difficile infection  Assessment & Plan  Hx of diverticulitis, C  Diff in the past  C  Diff PCR positive, but negative toxins  • Continue to monitor off prophylaxis    Metastatic melanoma [facility])  Assessment & Plan  Biopsy confirmed 3/17/2023    Right peritoneum consistent with malignant melanoma with brain mets  BRAF mutation  · Encorafenib and Binimetinib started April 2023 (Currently on hold)  · With the new diagnosis of perforation/pneumoperitoneum-discussed with hematology oncology  Patient with extensive metastatic disease-currently immunotherapy on hold  Poor prognosis from oncology standpoint    Autoimmune hemolytic anemia  Assessment & Plan  History of warm antibody hemolytic anemia  Previously treated with Rituxan (8/2022 last received)  • Continue chronic steroid treatment with Dexamethasone  • Previous team spoke to hematology-oncology ([place]), May taper Dexamethasone from 4mg Q8 hours to Q12 hours due to side effects (Hallucinations)  (Dexamethasone is for the mets to his brain, previously on prednisone for the hemolytic anemia)  • Appreciate hematology-oncology recommendations  History of pulmonary embolism  Assessment & Plan  Maintained on Pradaxa    * Acute respiratory failure with hypoxia [facility])  Assessment & Plan  79-year-old male presenting with cough, chest tightness, shortness of breath, and generalized weakness likely secondary to Pneomocystis Carinii Pneumonia (Diagnosed through bronchoscopy, BAL positive for this)  • Improved  Currently on room air  • Continue antibiotics per PCP as per ID recommendations  VTE Pharmacologic Prophylaxis: VTE Score: 5 Moderate Risk (Score 3-4) - Pharmacological DVT Prophylaxis Contraindicated  Sequential Compression Devices Ordered  Patient Centered Rounds: I performed bedside rounds with nursing staff today  Discussions with Specialists or Other Care Team Provider: Surgery palliative care and hematology    Education and Discussions with Family / Patient: Updated  (wife) via phone      Total Time Spent on Date of Encounter in care of patient: 45 minutes This time was spent on one or more of the following: performing physical exam; counseling and coordination of care; obtaining or reviewing history; documenting in the medical record; reviewing/ordering tests, medications or procedures; communicating with other healthcare professionals and discussing with patient's family/caregivers  Current Length of Stay: 15 day(s)  Current Patient Status: Inpatient   Certification Statement: The patient will continue to require additional inpatient hospital stay due to Pending discharge plan  Discharge Plan: Anticipate discharge in 24-48 hrs to Home with hospice    Code Status: Level 3 - DNAR and DNI    Subjective:   Patient seen and examined  Overnight events noted  Patient noted to have pneumoperitoneum on CT-patient high surgical risk and-patient is currently on antibiotics  Palliative care had a long discussion with patient's family-plan for home with hospice    Objective:     Vitals:   Temp (24hrs), Av 7 °F (36 5 °C), Min:97 6 °F (36 4 °C), Max:97 8 °F (36 6 °C)    Temp:  [97 6 °F (36 4 °C)-97 8 °F (36 6 °C)] 97 8 °F (36 6 °C)  HR:  [88-89] 88  Resp:  [18-20] 18  BP: (147)/(84-86) 147/84  SpO2:  [90 %-93 %] 93 %  Body mass index is 29 36 kg/m²  Input and Output Summary (last 24 hours):   No intake or output data in the 24 hours ending 05/15/23 1901    Physical Exam:   Physical Exam  Constitutional:       General: He is not in acute distress  HENT:      Head: Normocephalic  Nose: Nose normal    Eyes:      General: No scleral icterus  Cardiovascular:      Rate and Rhythm: Normal rate  Pulmonary:      Comments: Decreased breath sounds bilateral  Abdominal:      General: There is distension  Tenderness: There is abdominal tenderness  Musculoskeletal:         General: No swelling  Skin:     General: Skin is warm  Neurological:      Mental Status: He is alert  Comments: Patient is alert awake    Oriented to place and person          Additional Data:     Labs:  Results from last 7 days   Lab Units 05/15/23  0434 23  0537   WBC Thousand/uL 22 99*   < > 23 02*   HEMOGLOBIN g/dL 9 9*   < > 10 0*   HEMATOCRIT % 30 3*   < > 29 7*   PLATELETS Thousands/uL 499*   < > 521*   BANDS PCT %  --   --  4   NEUTROS PCT % 80*   < >  --    LYMPHS PCT % 11*   < >  --    LYMPHO PCT %  --   --  9*   MONOS PCT % 5   < >  --    MONO PCT %  --   --  3*   EOS PCT % 2   < > 0    < > = values in this interval not displayed       Results from last 7 days   Lab Units 05/15/23  0434 05/14/23 2029   SODIUM mmol/L 130* 127*   POTASSIUM mmol/L 4 2 4 7   CHLORIDE mmol/L 99 96   CO2 mmol/L 21 20*   BUN mg/dL 47* 51*   CREATININE mg/dL 1 72* 1 87*   ANION GAP mmol/L 10 11   CALCIUM mg/dL 8 7 9 1   ALBUMIN g/dL  --  3 6   TOTAL BILIRUBIN mg/dL  --  0 72   ALK PHOS U/L  --  94   ALT U/L  --  35   AST U/L  --  32   GLUCOSE RANDOM mg/dL 87 96     Results from last 7 days   Lab Units 05/14/23 2029   INR  1 35*             Results from last 7 days   Lab Units 05/14/23 2029 05/14/23  0537   LACTIC ACID mmol/L 1 4  --    PROCALCITONIN ng/ml  --  0 79*       Lines/Drains:  Invasive Devices     Peripheral Intravenous Line  Duration           Peripheral IV 05/15/23 Right;Ventral (anterior) Forearm <1 day                      Imaging: Reviewed radiology reports from this admission including: abdominal/pelvic CT    Recent Cultures (last 7 days):         Last 24 Hours Medication List:   Current Facility-Administered Medications   Medication Dose Route Frequency Provider Last Rate   • acetaminophen  650 mg Oral Q6H PRN Hilda Yu MD     • albuterol  2 puff Inhalation Q4H PRN Hilda Yu MD     • ALPRAZolam  0 5 mg Oral HS PRN Hilda Yu MD     • aluminum-magnesium hydroxide-simethicone  30 mL Oral Q6H PRN Hilda Yu MD     • benzonatate  100 mg Oral TID PRN Hilda Yu MD     • carbamide peroxide  5 drop Both Ears BID Hilda Yu MD     • dexamethasone  2 mg Intravenous Q12H [address] Suresh Dalal MD     • dextromethorphan-guaiFENesin 10 mL Oral Q4H PRN Nakul Calderon MD     • HYDROmorphone  0 5 mg Intravenous Q4H PRN Lorie Hyatt DO     • metoprolol  2 5 mg Intravenous Q6H PRN Miki Adkins MD     • ondansetron  4 mg Intravenous Q6H PRN Nakul Calderon MD     • oxyCODONE  10 mg Oral Q4H PRN Paschal Apley, MD     • oxyCODONE  5 mg Oral Q4H PRN Paschal Apley, MD     • pantoprazole  40 mg Intravenous Q24H [facility] Miki Adkins MD     • piperacillin-tazobactam  3 375 g Intravenous Q6H Lorie Edmar, DO 3 375 g (05/15/23 1300)   • polyethylene glycol  17 g Oral Daily PRN Nakul Calderon MD     • sodium chloride  150 mL/hr Intravenous Continuous Lorie Edmar,  mL/hr (05/15/23 1507)   • sulfamethoxazole-trimethoprim  320 mg of trimethoprim Intravenous Corrie Camacho MD          Today, Patient Was Seen By: Miki Adkins MD    **Please Note: This note may have been constructed using a voice recognition system  **

## 2023-05-15 NOTE — CONSULTS
Consultation - [address] [facility],[address]  70 y o   male  [place] [address] [address] B[place] Zimmerman*   MRN: [id number]  Encounter: [id number]    ASSESSMENT:    Patient Active Problem List   Diagnosis   • 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   • Hyperbilirubinemia   • Symptomatic anemia   • History of pulmonary embolism   • Portal vein thrombosis   • Elevated blood pressure reading without diagnosis of hypertension   • Shortness of breath   • Gastroesophageal reflux disease   • Autoimmune hemolytic anemia   • Sepsis (HCC)   • Acute kidney injury (HCC)   • Splenomegaly   • Iron overload due to repeated red blood cell transfusions   • Posttraumatic stress disorder   • Essential hypertension   • Glucose intolerance (impaired glucose tolerance)   • Renal insufficiency   • Auto immune neutropenia (HCC)   • Primary osteoarthritis of left knee   • Pain in joint, lower leg   • Pain in left knee   • COVID-19   • Thrombocytosis   • Primary localized osteoarthrosis of the knee, right   • Hyperglycemia   • Chronic bilateral low back pain with bilateral sciatica   • Hypercholesterolemia   • Metastatic melanoma ([facility] [address] )   • Dyspnea   • Acute respiratory failure with hypoxia (HCC)   • Elevated serum creatinine   • Elevated bilirubin   • Leukocytosis   • Lymphocele after surgical procedure   • Encounter for follow-up surveillance of melanoma   • Melanoma ([facility] [address] )   • Generalized weakness   • History of Clostridium difficile infection   • Ground glass opacity present on imaging of lung   • Pneumocystis car[facility]ii pneumonia ([facility] )   • Hyponatremia   • Hallucination       Active problems addressed:  · Perforated viscus  · Metastatic melanoma  · Liver mets  · Lung mets  · Brain mets  · Osseous mets  · PCP PNA  · Abdominal pain  · Goals of care    Consult is for goals of care    PLAN:    1  Goals:   • Patient was awake but was tired and deferred conversations to his wife  • I called wife, introduced palliative care   We spent time discussing goals of care  At this time, she is sadly but accepting the dire situation/poor overall prognosis, monika now with perforate viscus with no surgical intervention planned  She wants to honor his wish of not dying in the hospital and wants to bring him home  I discussed hospice as the next best option based on overall clinical picture and their expressed goal  She is agreeable to this  • CM referral to hospice placed  I discussed that this may take 24-72H to set up, depending on location and hospice agency  From here on out, things can continue to decline and if he becomes too unstable to move/transport to home with hospice from here, we can offer comfort measures in the hospital where he will be expected to die  • I discussed level 4 versus level 3 code status  I recommended a level 4 code status in the event of further decline so we do not escalate cares anymore, as escalating cares will not change the overall prognosis nor quality of life at this point  However, the family admits to feeling overwhelmed about this sudden turn of events  I encouraged them to speak further about this and I requested SLIM to follow up about this later on  • dispo per SLIM/CM/Hospice  • D/w SLIM and CM/Millieleigh Dailey via TT    Code status: Level 3 - DNAR and DNI   Decisional apparatus:  Patient does have some capacity to make medical decisions on my exam today  If such capacity is lost, patient's substitute decision maker would default to wife by [place]  Advance Directive / Living Will / POLST:  None on file    2  Social Support:  • Supportive listening provided  • Time spent providing emotional support to family over the phone who are clearly sad and overwhelmed  3  Symptom management:  Trial liquid oxycodone PO/SL in an effort to honor wish of going home on hospice (home hospice can't provide IV medications prn)      · Oxy 5mg PO/SL q4H prn moderate pain  · OxyIR 10mg PO/SL q4H prn severe pain  · Cont IV dilaudid 0 5mg q4H prn BT pain    Controlled Substance [place]    [place] or [place]  reviewed: No red flags were identified; safe to proceed with prescription  Peggye Jabs 03/03/2023 03/03/2023   1  Alprazolam 0 5 Mg Tablet 2 00  1  Me Juan  [id number]   Ronni (0205)   2 00 LME  Other  [place]     02/27/2023 02/27/2023   1  Alprazolam 0 5 Mg Tablet 2 00  1  Me Juan  [id number]   Ronni (0205)   2 00 LME  Other  [place]     12/14/2022 12/14/2022   1  Alprazolam 0 5 Mg Tablet 2 00  1  Me Juan  [id number]   Ronni (0205)   2 00 LME  Other  [place]     09/10/2022  09/09/2022   1  Alprazolam 0 5 Mg Tablet 2 00  1  Ja Car  [id number]   Ronni (0205)   2 00 LME  Other  [place]     07/06/2022 07/06/2022   1  Tramadol Hcl 50 Mg Tablet 10 00  2  Ja Car  [id number]   Ronni (0205)   25 00 MME  Other  [place]     11/15/2021  11/15/2021   1  Oxycodone Hcl (Ir) 5 Mg Tablet 30 00  5  Pa Con  [id number]   Ronni (0205)   45 00 MME  Other  [place]        I have reviewed the patient's controlled substance dispensing history in the Prescription Drug Monitoring Program in compliance with the [facility] regulations before prescribing any controlled substances  We appreciate the opportunity to participate in this patient's care  We will continue to follow  Please do not hesitate to contact our on-call provider through our clinic answering service at [phone] should you have acute symptom control concerns  IDENTIFICATION:  Inpatient consult to Palliative Care  Consult performed by: Sunny Baptiste MD  Consult ordered by: Jovanna Michele, [address] [place] [address]        Reason for Consult / Principal Problem: goals of care    HISTORY OF PRESENT ILLNESS:    Josue Lewis is a 70 y o  male with metastatic melanoma to the bones, brain, liver, lungs, AIHA on steroids, PE on pradaxa  He was recently admitted for diverticulitis and hospitalized again 5 days later for this admission since 4/30/2023 with return of diarrhea and worsening SOB  CTA showed new GOO  He has been treated for PCP PNA under careful management of pulmo and ID   His hospital course was  complicated by ARABELLA and so nephro was consulted  Initial plan was for acute rehab when stable  Unfortunately, he developed acute abdominal pain last night prompting a CT AP that showed interval development of large volume of free intraperitoneal air in keeping with perforated viscus origin  He was assessed last night by general surgery and after extensive discussions of risks and benefits, patient and family have decided to forego any surgical interventions  In further discussions with IM, patient and family also transitioned from level 1 (Full) to level 3 (DNR/DNI) code status  Palliative care consulted for further goals of care discussion  Met with patient who was asleep when I walked in  He woke up but was falling asleep during conversation, admitting to not getting any sleep last night  Deferred conversations to his wife  I called wife and introduced palliative care  We focused on their goals now  They had a thorough discussion with the surgical and IM teams and she displayed good understanding, insight, and judgement into his medical condition  They were told he likely has days to live  They want to bring him home to honor his wish of not dying in the hospital  I then explained hospice as the next best step in his care and supported this decision of foregoing any more life prolonging interventions and just focusing on his comfort until his last day  They really want to get him home and I explained having CM and hospice involved to get this going as soon as possible, but it can still take up to 1-3 days, maybe longer, depending on location and hospice availability  I also discussed that given his overall clinical picture, that further acute and possibly steep decline is very possible from her on out   I discussed and encouraged a transition to level 4/comfort measures only as opposed to level 3/DNR/DNI so we do not subject him to further escalations of care should something else happen to him, as these will likely not change his overall prognosis nor quality of life  To this end, wife became more tearful and admitted to feeling overwhelmed about the sudden turn of event last night  I encouraged her to think about this further  I requested CHAZ to follow up on this conversation later       Interview and exam limited by: none    Review of Systems   Unable to perform ROS: Other (sleepy, admits to some mild intermittent abdominal pain)       Past Medical History:   Diagnosis Date   • Acute diverticulitis 4/12/2023   • ARABELLA (acute kidney injury) ([facility] [address] [facility] ) 3/29/2018   • Anemia 11/02/2016   • Anxiety    • Arthritis    • Autoimmune hemolytic anemia ([facility] [address] )    • Claustrophobia    • COVID 12/2020   • DVT (deep venous thrombosis) (HCC)    • GERD (gastroesophageal reflux disease)    • Hearing loss, right    • Hemolytic anemia (HCC)    • History of transfusion     2018 - no adverse reaction   • Hypertension    • Malignant melanoma of leg, right ([facility] [address] ) 07/01/2022   • Palpitation    • Portal vein thrombosis    • PTSD (post-traumatic stress disorder)    • Pulmonary emboli (HCC)    • Squamous cell skin cancer 12/20/2022    SCCIS- 12/6/22   • Tobacco abuse      Past Surgical History:   Procedure Laterality Date   • CATARACT EXTRACTION Bilateral    • CHOLECYSTECTOMY  07/18/2017   • COLONOSCOPY     • ELBOW ARTHROPLASTY Left     bursectomy   • IR BIOPSY RETROPERITONEUM  3/17/2023   • IR DRAINAGE TUBE CHECK WITH SCLEROSIS  10/06/2022   • IR DRAINAGE TUBE CHECK WITH SCLEROSIS  10/10/2022   • IR DRAINAGE TUBE CHECK WITH SCLEROSIS  10/20/2022   • IR DRAINAGE TUBE CHECK WITH SCLEROSIS  10/27/2022   • IR DRAINAGE TUBE CHECK WITH SCLEROSIS  11/04/2022   • IR DRAINAGE TUBE CHECK WITH SCLEROSIS  11/15/2022   • IR DRAINAGE TUBE CHECK WITH SCLEROSIS  11/25/2022   • IR DRAINAGE TUBE PLACEMENT  09/19/2022   • JOINT REPLACEMENT Right 02/02/2021    knee   • KNEE SURGERY Right     meniscus tear   • LYMPH NODE BIOPSY Right 07/29/2022 Procedure: BIOPSY LYMPH NODE SENTINEL;  Surgeon: Paula Villegas MD;  [facility]: [facility];  Service: Surgical Oncology   • MOHS SURGERY Right 12/20/2022    Right dorsal hand SCCIS-Dr Isabel   • PA ARTHRP KNE CONDYLE&PLATU MEDIAL&LAT COMPARTMENTS Right 02/02/2021    Procedure: ARTHROPLASTY KNEE TOTAL;  Surgeon: Wally Prather MD;  [facility];  Service: Orthopedics   • PA ARTHRP KNE CONDYLE&PLATU MEDIAL&LAT COMPARTMENTS Left 11/17/2021    Procedure: TOTAL KNEE REPLACEMENT;  Surgeon: Wally Prather MD;  [facility];  Service: Orthopedics   • PA LAPS SURG CHOLECYSTECTOMY Amy Oh N/A 12/23/2017    Procedure: CHOLECYSTECTOMY LAPAROSCOPIC with cholangiogram;  Surgeon: Job Jeter MD;  [facility];  Service: General   • PA SPLENECTOMY TOTAL SEPARATE PROCEDURE N/A 05/18/2017    Procedure: LAPAROSCOPIC HAND ASSIST SPLENECTOMY;  Surgeon: Tobias Avendano MD;  [facility];  Service: Surgical Oncology   • SHOULDER SURGERY Left     rotator cuff x4, reconstruction   • SKIN BIOPSY  5 May 2022   • SKIN CANCER EXCISION  29 July 2022   • SKIN LESION EXCISION Right 07/29/2022    Procedure: WIDE EXCISION RIGHT MEDIAL THIGH;  Surgeon: Paula Villegas MD;  Location: [facility];  Service: Surgical Oncology     Social History     Socioeconomic History   • Marital status: /Civil Union     Spouse name: Not on file   • Number of children: Not on file   • Years of education: Not on file   • Highest education level: Not on file   Occupational History   • Not on file   Tobacco Use   • Smoking status: Some Days     Packs/day: 0 00     Years: 5 00     Pack years: 0 00     Types: Cigars, Cigarettes   • Smokeless tobacco: Current     Types: Snuff   • Tobacco comments:     5  a week average   Vaping Use   • Vaping Use: Never used   Substance and Sexual Activity   • Alcohol use:  Yes     Alcohol/week: 6 0 standard drinks     Types: 6 Cans of beer per week     Comment: socially   • Drug use: "Yes     Types: Marijuana     Comment: medical marijuana   • Sexual activity: Yes     Partners: Female     Birth control/protection: None   Other Topics Concern   • Not on file   Social History Narrative    Daily coffee consumption (2 cups/day)    reitred     Social Determinants of Health     Financial Resource Strain: Not on file   Food Insecurity: No Food Insecurity   • Worried About Running Out of Food in the Last Year: Never true   • Ran Out of Food in the Last Year: Never true   Transportation Needs: No Transportation Needs   • Lack of Transportation (Medical): No   • Lack of Transportation (Non-Medical): No   Physical Activity: Not on file   Stress: Not on file   Social Connections: Not on file   Intimate Partner Violence: Not on file   Housing Stability: Low Risk    • Unable to Pay for Housing in the Last Year: No   • Number of Places Lived in the Last Year: 1   • Unstable Housing in the Last Year: No     Family History   Problem Relation Age of Onset   • Cancer Mother    • Breast cancer Mother    • Other Father         CABG   • Heart attack Paternal Grandfather         acute MI       MEDICATIONS / ALLERGIES:  all current active meds have been reviewed    No Known Allergies    OBJECTIVE:  /86   Pulse 89   Temp 97 6 °F (36 4 °C)   Resp 20   Ht 5' 8\" (1 727 m)   Wt 87 6 kg (193 lb 2 oz)   SpO2 90%   BMI 29 36 kg/m²   Physical Exam:  Constitutional: Appears well-developed and well-nourished  Appears acutely and chronically ill looking, but not toxic appearing  Appears tired  Stable  In no acute physical or emotional distress  Head: Normocephalic and atraumatic  Eyes: EOM are normal  No ocular discharge  No scleral icterus  Neck: No visible adenopathy or masses  Respiratory: Effort normal  No stridor  No respiratory distress  Gastrointestinal: No abdominal distension  Musculoskeletal: No edema  Neurological: Very sleepy, unable to maintain wakefulness enough to hold a conversation   " Skin: Dry, no diaphoresis  Psychiatric: Unable to assess fully    Lab Results: I have personally reviewed pertinent labs  Imaging Studies: I have personally reviewed pertinent reports  EKG, Pathology, and Other Studies: I have personally reviewed pertinent reports  Counseling / Coordination of Care:  Counseling / Coordination of Care  Total floor / unit time spent today 60 minutes  Greater than 50% of total time was spent with the patient and / or family counseling and / or coordination of care  A description of the counseling / coordination of care: provided medical updates, discussed palliative care, discussed hospice care, determined competency, determined goals of care, determined POA, determined social/family support, discussed plans of care, discussed symptom management, provided psychosocial support       Benji Parkinson MD  [facility] and Supportive Care

## 2023-05-15 NOTE — QUICK NOTE
General Surgery Quick Note:    Patient seen and examined bedside  Transitioned to Level 3 DNR DNI last evening  HR improved to 90s in room and sating 90s ORA  Abdominal exam still with peritonitis  Patient denies nausea or emesis this morning, endorses abdominal pain around his belly button  WBC 22 99 from 28 11; 9 9 from 10 9  Lactic last night 1 4    Palliative consulted  Additionally recommend Medical Oncology consult  Surgery team available as needed    Please see formal consult note from 12:01AM regarding full goals of care discussion with family and surgical team

## 2023-05-15 NOTE — ASSESSMENT & PLAN NOTE
Sodium levels downtrending  Hydrohlothiazide held     · Fluid restriction 1500    Recent Labs     05/13/23  0907 05/14/23  0537 05/14/23 2029 05/15/23  0434   SODIUM 129* 128* 127* 130*     Results from last 7 days   Lab Units 05/12/23  0504 05/09/23  1630   OSMO UR mmol/KG  --  741   SODIUM UR   --  65  86   OSMOLALITY, SERUM mmol/*  --

## 2023-05-15 NOTE — PLAN OF CARE
Problem: MOBILITY - ADULT  Goal: Maintain or return to baseline ADL function  Description: INTERVENTIONS:  - Educate patient/family on patient safety including physical limitations  - Instruct patient to call for assistance with activity   - Consult OT/PT to assist with strengthening/mobility   - Keep Call bell within reach  - Keep bed low and locked with side rails adjusted as appropriate  - Keep care items and personal belongings within reach  - Initiate and maintain comfort rounds  - Initiate/Maintain bed alarm  - Obtain necessary fall risk management equipment:   - Apply yellow socks and bracelet for high fall risk patients  - Consider moving patient to room near nurses station  5/15/2023 0736 by Yadira Gay RN  Outcome: Progressing  5/15/2023 0732 by Yadira Gay RN  Outcome: Progressing  Goal: Maintains/Returns to pre admission functional level  Description: INTERVENTIONS:  - Perform BMAT or MOVE assessment daily    - Set and communicate daily mobility goal to care team and patient/family/caregiver     - Collaborate with rehabilitation services on mobility goals if consulted  - Out of bed for toileting  - Record patient progress and toleration of activity level   5/15/2023 0736 by Yadira Gay RN  Outcome: Progressing  5/15/2023 0732 by Yadira Gay RN  Outcome: Progressing

## 2023-05-15 NOTE — ARC ADMISSION
Notified by CM that patient/family are now pursuing discharge home with hospice given acute medical change over the weekend  Please notify with any changes

## 2023-05-15 NOTE — ASSESSMENT & PLAN NOTE
Biopsy confirmed 3/17/2023  Right peritoneum consistent with malignant melanoma with brain mets  BRAF mutation  · Encorafenib and Binimetinib started April 2023 (Currently on hold)  · With the new diagnosis of perforation/pneumoperitoneum-discussed with hematology oncology  Patient with extensive metastatic disease-currently immunotherapy on hold    Poor prognosis from oncology standpoint

## 2023-05-15 NOTE — ASSESSMENT & PLAN NOTE
Maintained on Pradaxa Received fax from pharmacy requesting refill on pts medication(s). Pt was last seen in office on 4/1/2019  and has a follow up scheduled for Visit date not found. Will send request to  Dr. Manny Hendrickson  for authorization.      Requested Prescriptions     Pending Prescriptions Disp Refills    levothyroxine (SYNTHROID) 50 MCG tablet [Pharmacy Med Name: Levothyroxine Sodium 50 MCG Oral Tablet] 90 tablet 0     Sig: Take 1 tablet by mouth Daily

## 2023-05-16 NOTE — ASSESSMENT & PLAN NOTE
Possibly due to hydrochlorothiazide, bactrim  · Due to uptrending levels will have renal evaluate him  ·     Recent Labs     05/14/23  0537 05/14/23  2029 05/15/23  0434   BUN 44* 51* 47*   CREATININE 1 72* 1 87* 1 72*   EGFR 39 35 39       Results from last 7 days   Lab Units 05/11/23  1423   BLOOD UA  Large*   PROTEIN UA mg/dl 70 (1+)*

## 2023-05-16 NOTE — CONSULTS
Medical Oncology/Hematology Consult Note  Jose Espana, male, 70 y o , 1955,  [address] Daryl Quick*, [phone]     Patient is a 75 y  o  male with PMH of autoimmune hemolytic anemia followed by Dr January Miller, and metastatic melanoma followed by Dr Jason Velez  He was on adjuvant encorafenib and binimetinib for his melanoma, along with chronic steroid use  He presented to the ED on 4/30/23 with shortness of breath, found to have Pneumocystis carinii pneumonia  On 5/14/2023, patient was complaining of abdominal distention  CT imaging showed large volume free intraperitoneal air consistent with perforated viscus  General surgery was consulted, did not recommend surgical intervention due to high risk procedure  Patient and patient's family decided yesterday to go home on hospice  Assessment and Plan:  1  Metastatic melanoma   -Biopsy (3/17/2023) R peritoneum consistent with malignant melanoma  Metastatic to brain   -Has BRAF mutation, therefore, placed on encorafenib and binimetinib since the beginning of April 2023    Imaging:  -MRI brain (3/4/23)- Too numerous to count metastatic lesions in bilateral cerebral hemispheres and to a lesser extent in bilateral cerebellum with perilesional vasogenic edema (bilateral frontal, bilateral parietal, right subinsular, left temporal, and bilateral occipital lobes - worse in right frontal lobe)  -MRI brain (5/12/2023)- Evidence of treatment response with significant reduction in size of the widespread parenchymal brain metastases and diminished vasogenic edema associated with some of the larger metastases particularly in both frontal lobe  -Numerous residual decreasing in size metastases remain including nodular lesions along the margins of the lateral ventricles  -CT C/A/P (4/22/23) -Multiple subtle enhancing lesions throughout the liver in keeping with numerous hepatic metastases   Known pulmonary and retroperitoneal metastases   Unchanged "right iliac chain and inguinal adenopathy in this patient with metastatic melanoma   Known osseous metastases are not well visualized on this study  CT A/P (4/30/23)-  New bilateral lung groundglass opacities and infiltrates, likely inflammatory/infectious  Scattered small lung nodules again visualized, suspicious for metastases  Scattered liver lesions again visualized, most concerning for metastases  Multiple intra-abdominal and right inguinal nodules, likely metastatic  Known osseous metastases     2  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  -Was on Rituxan in the past, last received on 8/2022   -Currently on chronic prednisone treatment, dexamethasone 4 mg every 8 hours  -Hemoglobin stable: 9 9 <10 9 < 11 9<10 5 (4/24) <1 7<14 0 (admission date)<12 3  -, MCHC 33 2, macrocytic, normochromic  -Leukocytosis secondary to his chronic steroids exacerbated by his respiratory inflammatory process as outlined below    3  Acute respiratory failure with hypoxia  -Presented with nonproductive cough, chest tightness, shortness of breath, generalized weakness  Diagnosed with Pneumocystis carinii pneumonia   -Imaging showed new bilateral groundglass opacities, scattered pulmonary/liver/inguinal nodules  -currently on room air  4  Perforation of viscus  -CT scan done due to abdominal distention; noted to have pneumoperitoneum concerning for perforation of the viscus  -CT abdomen pelvis (5/14/2023)- interval development of large volume of free intraperitoneal air in keeping with perforated viscus origin is not entirely clear  Only minimal bowel distention is identified  Hepatic lesions better identified on prior contrast exam  Retroperitoneal and right inguinal soft tissue nodules again identified       Discussion:   Patient was very welcoming as soon as I walked in his room  The first thing he said was, \"so I am sure you have heard  \"  He expressed that he was very comfortable and at peace with his decision to go " home on hospice  He appreciated the validation that he has fought so hard for so long  He was very thankful for the care provided by his medical team   Dr Sandy Paz aware of his decision to go on home hospice, she was able to reach out to him this morning as well  At this time, we are awaiting approval for hospice care  He is expected to be discharged as soon as this is settled  Oncology will sign off; we will cancel his future appointments in the outpatient setting  Please do not hesitate to reach out for questions or concerns  Thank you for this consult  Stephy Barnhart, MIKO, VISHNU  Hematology-Oncology     History of present illness:  Swati Castillo is a 70 y o  male  with PMH of hypertension, GERD, hyperglycemia, hypercholesterolemia  He is currently being followed by Dr Singh Chance for his 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 and Dr Maame St for his melanoma  He presented to the ED with stool incontinence  He reported recurrence of diarrhea since last admission; recently admitted for acute diverticulitis 4/11-4/14, now resolved on CT imaging  For his autoimmune hemolytic anemia, he has been taking prednisone chronically with weekly CBCs  For his metatastic melanoma, he's currently on adjuvant encorafenib and binimetinib  he was recently admitted on 04/11/23 for diverticulitis, then 04/22/23 again for diarrhea/ARABELLA  He was then discharged on 04/25/23  From last admission, hematology-oncology was consulted for patient is concerned about his hemolytic anemia  Discussed that his hemoglobin has been stable then, hemoglobin stable as well for this admission  Patient presented to the ED on 4/30/2023 for shortness of breath, weakness, diarrhea  Review of Systems:   Review of Systems   Constitutional: Positive for activity change and fatigue  Negative for chills and fever  HENT: Negative for ear pain and sore throat  Eyes: Negative for pain and visual disturbance     Respiratory: Negative for cough and shortness of breath  Cardiovascular: Negative for chest pain and palpitations  Gastrointestinal: Negative for abdominal pain, diarrhea and vomiting  Genitourinary: Negative for dysuria and hematuria  Musculoskeletal: Negative for arthralgias and back pain  Skin: Negative for color change and rash  Neurological: Positive for weakness  Negative for seizures and syncope  Psychiatric/Behavioral: The patient is not nervous/anxious  All other systems reviewed and are negative  Oncology History:   Cancer Staging   Personal history of malignant melanoma  Staging form: Melanoma of the Skin, AJCC 8th Edition  - Clinical stage from 5/31/2022: Stage IIB (cT4a, cN0, cM0) - Signed by Maria Elena Tate MD on 8/25/2022  - Pathologic stage from 7/29/2022: Stage IIIC (pT4a, pN1a, cM0) - Signed by Maria Elena Tate MD on 8/25/2022    Oncology History Overview Note   History of Stage IIIc melanoma  Completed whole brain RT on 3/29/23  Today's appointment is an EOT phone follow-up    4/11/23-4/14/23  Admitted, Sharyn Cavazos  DX: weakness, acute diverticulitis, diarrhea    4/22/23-4/25/23  Admitted [facility]  DX: Sepsis, ARABELLA, PE with acute cor pulmonale    4/30/23- present  [facility]  DX: generalized weakness         Metastatic melanoma ([address] )   5/31/2022 Biopsy    Right Leg, Shave Biopsy   Melanoma extending to the deep specimen margin  Thickness: 7 0mm  Ulceration: not seen   Mitoses: 3      5/31/2022 -  Cancer Staged    Staging form: Melanoma of the Skin, AJCC 8th Edition  - Clinical stage from 5/31/2022: Stage IIB (cT4a, cN0, cM0) - Signed by Maria Elena Tate MD on 8/25/2022 7/1/2022 Initial Diagnosis    Malignant melanoma of leg, right ([address] )     7/29/2022 Surgery    A  Lymph node, sentinel, #1:  One lymph node with rare metastatic melanoma cells (1/1), subcapsular location  Immunohistochemical study for MART-1, HMB45 and S100 supports the findings           B  Leg, right, knee, wide excision:  Residual melanoma, nodular type, and scar; margins free  See synoptic report  7/29/2022 -  Cancer Staged    Staging form: Melanoma of the Skin, AJCC 8th Edition  - Pathologic stage from 7/29/2022: Stage IIIC (pT4a, pN1a, cM0) - Signed by Jesus Cope MD on 8/25/2022 12/6/2022 Biopsy    Final Diagnosis   A  Skin, Right dorsal hand, Shave biopsy:  Squamous cell carcinoma in-situ, at least; extending to biopsy margins            Radiation    Treatment:  Course: C1    Plan ID Energy Fractions Dose per Fraction (cGy) Dose Correction (cGy) Total Dose Delivered (cGy) Elapsed Days   Whole Brai # 6X 10 / 10 300 0 3,000 13      Treatment dates:  C1: 3/16/2023 - 3/29/2023             Past Medical History:   Past Medical History:   Diagnosis Date   • Acute diverticulitis 4/12/2023   • ARABELLA (acute kidney injury) ([address] ) 3/29/2018   • Anemia 11/02/2016   • Anxiety    • Arthritis    • Autoimmune hemolytic anemia ([address] )    • Claustrophobia    • COVID 12/2020   • DVT (deep venous thrombosis) (HCC)    • GERD (gastroesophageal reflux disease)    • Hearing loss, right    • Hemolytic anemia (HCC)    • History of transfusion     2018 - no adverse reaction   • Hypertension    • Malignant melanoma of leg, right ([address] ) 07/01/2022   • Palpitation    • Portal vein thrombosis    • PTSD (post-traumatic stress disorder)    • Pulmonary emboli (HCC)    • Squamous cell skin cancer 12/20/2022    SCCIS- 12/6/22   • Tobacco abuse        Past Surgical History:   Procedure Laterality Date   • CATARACT EXTRACTION Bilateral    • CHOLECYSTECTOMY  07/18/2017   • COLONOSCOPY     • ELBOW ARTHROPLASTY Left     bursectomy   • IR BIOPSY RETROPERITONEUM  3/17/2023   • IR DRAINAGE TUBE CHECK WITH SCLEROSIS  10/06/2022   • IR DRAINAGE TUBE CHECK WITH SCLEROSIS  10/10/2022   • IR DRAINAGE TUBE CHECK WITH SCLEROSIS  10/20/2022   • IR DRAINAGE TUBE CHECK WITH SCLEROSIS  10/27/2022   • IR DRAINAGE TUBE CHECK WITH SCLEROSIS  11/04/2022   • IR DRAINAGE TUBE CHECK WITH SCLEROSIS  11/15/2022   • IR DRAINAGE TUBE CHECK WITH SCLEROSIS  11/25/2022   • IR DRAINAGE TUBE PLACEMENT  09/19/2022   • JOINT REPLACEMENT Right 02/02/2021    knee   • KNEE SURGERY Right     meniscus tear   • LYMPH NODE BIOPSY Right 07/29/2022    Procedure: BIOPSY LYMPH NODE SENTINEL;  Surgeon: Charity Dove MD;  [facility];  Service: Surgical Oncology   • MOHS SURGERY Right 12/20/2022    Right dorsal hand SCCIS-Dr Isabel   • NJ ARTHRP KNE CONDYLE&PLATU MEDIAL&LAT COMPARTMENTS Right 02/02/2021    Procedure: ARTHROPLASTY KNEE TOTAL;  Surgeon: Rosaline Ribeiro MD;  [facility];  Service: Orthopedics   • NJ ARTHRP KNE CONDYLE&PLATU MEDIAL&LAT COMPARTMENTS Left 11/17/2021    Procedure: TOTAL KNEE REPLACEMENT;  Surgeon: Rosaline Ribeiro MD;  [facility];  Service: Orthopedics   • NJ LAPS SURG CHOLECYSTECTOMY [place]  N/A 12/23/2017    Procedure: CHOLECYSTECTOMY LAPAROSCOPIC with cholangiogram;  Surgeon: Cristopher Gottron, MD;  [facility];  Service: General   • NJ SPLENECTOMY TOTAL SEPARATE PROCEDURE N/A 05/18/2017    Procedure: LAPAROSCOPIC HAND ASSIST SPLENECTOMY;  Surgeon: Ada Serna MD;  [facility];  Service: Surgical Oncology   • SHOULDER SURGERY Left     rotator cuff x4, reconstruction   • SKIN BIOPSY  5 May 2022   • SKIN CANCER EXCISION  29 July 2022   • SKIN LESION EXCISION Right 07/29/2022    Procedure: WIDE EXCISION RIGHT MEDIAL THIGH;  Surgeon: Charity Dove MD;  [facility];  Service: Surgical Oncology       Family History   Problem Relation Age of Onset   • Cancer Mother    • Breast cancer Mother    • Other Father         CABG   • Heart attack Paternal Grandfather         acute MI       Social History     Socioeconomic History   • Marital status: /Civil Union     Spouse name: None   • Number of children: None   • Years of education: None   • Highest education level: None   Occupational History   • None Tobacco Use   • Smoking status: Some Days     Packs/day: 0 00     Years: 5 00     Pack years: 0 00     Types: Cigars, Cigarettes   • Smokeless tobacco: Current     Types: Snuff   • Tobacco comments:     5  a week average   Vaping Use   • Vaping Use: Never used   Substance and Sexual Activity   • Alcohol use: Yes     Alcohol/week: 6 0 standard drinks     Types: 6 Cans of beer per week     Comment: socially   • Drug use: Yes     Types: Marijuana     Comment: medical marijuana   • Sexual activity: Yes     Partners: Female     Birth control/protection: None   Other Topics Concern   • None   Social History Narrative    Daily coffee consumption (2 cups/day)    reitred     Social Determinants of Health     Financial Resource Strain: Not on file   Food Insecurity: No Food Insecurity   • Worried About Running Out of Food in the Last Year: Never true   • Ran Out of Food in the Last Year: Never true   Transportation Needs: No Transportation Needs   • Lack of Transportation (Medical): No   • Lack of Transportation (Non-Medical):  No   Physical Activity: Not on file   Stress: Not on file   Social Connections: Not on file   Intimate Partner Violence: Not on file   Housing Stability: Low Risk    • Unable to Pay for Housing in the Last Year: No   • Number of Places Lived in the Last Year: 1   • Unstable Housing in the Last Year: No         Current Facility-Administered Medications:   •  acetaminophen (TYLENOL) tablet 650 mg, 650 mg, Oral, Q6H PRN, Carol Uriarte MD, 650 mg at 05/13/23 2130  •  albuterol (PROVENTIL HFA,VENTOLIN HFA) inhaler 2 puff, 2 puff, Inhalation, Q4H PRN, Carol Uriarte MD, 2 puff at 05/05/23 1346  •  ALPRAZolam (XANAX) tablet 0 5 mg, 0 5 mg, Oral, HS PRN, Carol Uriarte MD  •  aluminum-magnesium hydroxide-simethicone (MYLANTA) oral suspension 30 mL, 30 mL, Oral, Q6H PRN, Carol Uriarte MD  •  benzonatate (TESSALON PERLES) capsule 100 mg, 100 mg, Oral, TID PRN, Keren Glez Arnold Rodríguez MD, 100 mg at 04/30/23 2325  •  dexamethasone (DECADRON) injection 2 mg, 2 mg, Intravenous, Q12H [facility], Bettina Alvarez MD, 2 mg at 05/15/23 2030  •  dextromethorphan-guaiFENesin (ROBITUSSIN DM) oral syrup 10 mL, 10 mL, Oral, Q4H PRN, Adolfo Broussard MD, 10 mL at 05/01/23 1405  •  HYDROmorphone (DILAUDID) injection 0 5 mg, 0 5 mg, Intravenous, Q4H PRN, Le Velazquez DO, 0 5 mg at 05/15/23 2030  •  metoprolol (LOPRESSOR) injection 2 5 mg, 2 5 mg, Intravenous, Q6H PRN, Bettina Alvarez MD  •  ondansetron [facility]) injection 4 mg, 4 mg, Intravenous, Q6H PRN, Adolfo Broussard MD  •  oxyCODONE (ROXICODONE) oral solution 10 mg, 10 mg, Oral, Q4H PRN, Rae James MD  •  oxyCODONE (ROXICODONE) oral solution 5 mg, 5 mg, Oral, Q4H PRN, Rae James MD  •  pantoprazole (PROTONIX) injection 40 mg, 40 mg, Intravenous, Q24H [facility], Bettina Alvarez MD, 40 mg at 05/15/23 0837  •  piperacillin-tazobactam (ZOSYN) 3 375 g in sodium chloride 0 9 % 100 mL IVPB, 3 375 g, Intravenous, Q6H, Le Velazquez DO, Last Rate: 200 mL/hr at 05/16/23 0159, 3 375 g at 05/16/23 0159  •  polyethylene glycol (MIRALAX) packet 17 g, 17 g, Oral, Daily PRN, Adolfo Broussard MD  •  sodium chloride 0 9 % infusion, 150 mL/hr, Intravenous, Continuous, Le Velazquez DO, Last Rate: 150 mL/hr at 05/15/23 2030, 150 mL/hr at 05/15/23 2030  •  sulfamethoxazole-trimethoprim (BACTRIM) 320 mg of trimethoprim in dextrose 5 % 500 mL IVPB, 320 mg of trimethoprim, Intravenous, Q8H, Renata Mcfarlane MD, Last Rate: 333 3 mL/hr at 05/16/23 0338, 320 mg of trimethoprim at 05/16/23 7271    Medications Prior to Admission   Medication   • acetaminophen (TYLENOL) 325 mg tablet   • ALPRAZolam (XANAX) 0 5 mg tablet   • binimetinib (MEKTOVI) 15 MG tablet   • dabigatran etexilate (PRADAXA) 150 mg capsu   • dexamethasone (DECADRON) 4 mg tablet   • encorafenib (BRAFTOVI) 50 MG capsule   • hydrochlorothiazide (HYDRODIURIL) 25 mg "tablet   • memantine (Namenda) 10 mg tablet   • metoprolol succinate (TOPROL-XL) 25 mg 24 hr tablet   • ondansetron (ZOFRAN) 4 mg tablet   • pantoprazole (PROTONIX) 40 mg tablet   • potassium chloride (K-DUR,KLOR-CON) 20 mEq tablet   • prazosin (MINIPRESS) 1 mg capsule   • VITAMIN D PO   • ascorbic acid (VITAMIN C) 1000 MG tablet       No Known Allergies      Physical Exam:     /69   Pulse 78   Temp 98 °F (36 7 °C)   Resp 22   Ht 5' 8\" (1 727 m)   Wt 87 6 kg (193 lb 2 oz)   SpO2 92%   BMI 29 36 kg/m²     Physical Exam  Constitutional:       Appearance: He is obese  HENT:      Head: Normocephalic  Mouth/Throat:      Mouth: Mucous membranes are moist    Cardiovascular:      Rate and Rhythm: Normal rate and regular rhythm  Pulmonary:      Comments: On room air  Abdominal:      General: Bowel sounds are normal  There is distension  Palpations: Abdomen is soft  Skin:     General: Skin is warm and dry  Findings: Bruising present  Neurological:      General: No focal deficit present  Mental Status: He is alert  Motor: Weakness present  Psychiatric:         Behavior: Behavior normal          Thought Content:  Thought content normal            Recent Results (from the past 48 hour(s))   Lactic acid, plasma (w/reflex if result > 2 0)    Collection Time: 05/14/23  8:29 PM   Result Value Ref Range    LACTIC ACID 1 4 0 5 - 2 0 mmol/L   CBC and differential    Collection Time: 05/14/23  8:29 PM   Result Value Ref Range    WBC 28 11 (H) 4 31 - 10 16 Thousand/uL    RBC 3 11 (L) 3 88 - 5 62 Million/uL    Hemoglobin 10 9 (L) 12 0 - 17 0 g/dL    Hematocrit 32 7 (L) 36 5 - 49 3 %     (H) 82 - 98 fL    MCH 35 0 (H) 26 8 - 34 3 pg    MCHC 33 3 31 4 - 37 4 g/dL    RDW 15 0 11 6 - 15 1 %    MPV 9 5 8 9 - 12 7 fL    Platelets 548 (H) 651 - 390 Thousands/uL    nRBC 3 /100 WBCs    Neutrophils Relative 83 (H) 43 - 75 %    Immat GRANS % 2 0 - 2 %    Lymphocytes Relative 11 (L) 14 - 44 %    " Monocytes Relative 4 4 - 12 %    Eosinophils Relative 0 0 - 6 %    Basophils Relative 0 0 - 1 %    Neutrophils Absolute 23 24 (H) 1 85 - 7 62 Thousands/µL    Immature Grans Absolute >0 50 (H) 0 00 - 0 20 Thousand/uL    Lymphocytes Absolute 3 09 0 60 - 4 47 Thousands/µL    Monocytes Absolute 1 07 0 17 - 1 22 Thousand/µL    Eosinophils Absolute 0 01 0 00 - 0 61 Thousand/µL    Basophils Absolute 0 10 0 00 - 0 10 Thousands/µL   Comprehensive metabolic panel    Collection Time: 05/14/23  8:29 PM   Result Value Ref Range    Sodium 127 (L) 135 - 147 mmol/L    Potassium 4 7 3 5 - 5 3 mmol/L    Chloride 96 96 - 108 mmol/L    CO2 20 (L) 21 - 32 mmol/L    ANION GAP 11 4 - 13 mmol/L    BUN 51 (H) 5 - 25 mg/dL    Creatinine 1 87 (H) 0 60 - 1 30 mg/dL    Glucose 96 65 - 140 mg/dL    Calcium 9 1 8 4 - 10 2 mg/dL    AST 32 13 - 39 U/L    ALT 35 7 - 52 U/L    Alkaline Phosphatase 94 34 - 104 U/L    Total Protein 6 0 (L) 6 4 - 8 4 g/dL    Albumin 3 6 3 5 - 5 0 g/dL    Total Bilirubin 0 72 0 20 - 1 00 mg/dL    eGFR 35 ml/min/1 73sq m   Protime-INR    Collection Time: 05/14/23  8:29 PM   Result Value Ref Range    Protime 16 6 (H) 11 6 - 14 5 seconds    INR 1 35 (H) 0 84 - 1 19   APTT    Collection Time: 05/14/23  8:29 PM   Result Value Ref Range    PTT 41 (H) 23 - 37 seconds   Basic metabolic panel    Collection Time: 05/15/23  4:34 AM   Result Value Ref Range    Sodium 130 (L) 135 - 147 mmol/L    Potassium 4 2 3 5 - 5 3 mmol/L    Chloride 99 96 - 108 mmol/L    CO2 21 21 - 32 mmol/L    ANION GAP 10 4 - 13 mmol/L    BUN 47 (H) 5 - 25 mg/dL    Creatinine 1 72 (H) 0 60 - 1 30 mg/dL    Glucose 87 65 - 140 mg/dL    Calcium 8 7 8 4 - 10 2 mg/dL    eGFR 39 ml/min/1 73sq m   CBC and differential    Collection Time: 05/15/23  4:34 AM   Result Value Ref Range    WBC 22 99 (H) 4 31 - 10 16 Thousand/uL    RBC 2 83 (L) 3 88 - 5 62 Million/uL    Hemoglobin 9 9 (L) 12 0 - 17 0 g/dL    Hematocrit 30 3 (L) 36 5 - 49 3 %     (H) 82 - 98 fL    MCH 35 0 (H) 26 8 - 34 3 pg    MCHC 32 7 31 4 - 37 4 g/dL    RDW 15 2 (H) 11 6 - 15 1 %    MPV 9 8 8 9 - 12 7 fL    Platelets 651 (H) 905 - 390 Thousands/uL    nRBC 4 /100 WBCs    Neutrophils Relative 80 (H) 43 - 75 %    Immat GRANS % 2 0 - 2 %    Lymphocytes Relative 11 (L) 14 - 44 %    Monocytes Relative 5 4 - 12 %    Eosinophils Relative 2 0 - 6 %    Basophils Relative 0 0 - 1 %    Neutrophils Absolute 18 50 (H) 1 85 - 7 62 Thousands/µL    Immature Grans Absolute 0 37 (H) 0 00 - 0 20 Thousand/uL    Lymphocytes Absolute 2 52 0 60 - 4 47 Thousands/µL    Monocytes Absolute 1 17 0 17 - 1 22 Thousand/µL    Eosinophils Absolute 0 38 0 00 - 0 61 Thousand/µL    Basophils Absolute 0 05 0 00 - 0 10 Thousands/µL       CT abdomen pelvis wo contrast    Result Date: 4/12/2023  Narrative: CT ABDOMEN AND PELVIS WITHOUT IV CONTRAST INDICATION:   Diarrhea Bowel obstruction suspected Diarrhea for 2 weeks with distended abdomen    COMPARISON:  4/30/2022  TECHNIQUE:  CT examination of the abdomen and pelvis was performed without intravenous contrast  Multiplanar 2D reformatted images were created from the source data  Radiation dose length product (DLP) for this visit:  647 59 mGy-cm   This examination, like all CT scans performed in the [facility], was performed utilizing techniques to minimize radiation dose exposure, including the use of iterative  reconstruction and automated exposure control  Enteric contrast was not administered  FINDINGS: ABDOMEN LOWER CHEST:  Moderate coronary artery calcifications  Patchy dependent atelectasis in the right lower lobe  LIVER/BILIARY TREE:  Mild fatty infiltration  No biliary ductal dilatation  GALLBLADDER:  Status post cholecystectomy  SPLEEN:  Status post splenectomy  There are coils seen in the region of the splenic artery  PANCREAS:  Unremarkable  ADRENAL GLANDS:  Unremarkable  KIDNEYS/URETERS:  Unremarkable  No hydronephrosis   There is however a 2 0 x 1 8 cm nodule seen inferior to the right kidney in the right retroperitoneum  This was not seen on the prior CT study and therefore may reflect a metastatic deposit  STOMACH AND BOWEL:  Tiny hiatal hernia  No bowel obstruction  There is colonic diverticulosis  There is a focal area of colonic wall thickening and pericolonic inflammation at the junction of the descending colon and proximal sigmoid colon consistent with mild acute diverticulitis  APPENDIX:  A normal appendix was visualized  ABDOMINOPELVIC CAVITY:  No ascites  No pneumoperitoneum  No other lymphadenopathy  VESSELS:  The abdominal is calcified  No retroperitoneal hematoma  PELVIS REPRODUCTIVE ORGANS:  The prostate is mildly enlarged  URINARY BLADDER:  Unremarkable  ABDOMINAL WALL/INGUINAL REGIONS:  There are small fat-containing bilateral inguinal hernias right greater than left  There is a small fat-containing periumbilical hernia  OSSEOUS STRUCTURES:  No acute fracture or destructive osseous lesion  Mild compression deformities of several lower thoracic vertebral bodies appears stable  Advanced multilevel degenerative disc disease in the thoracal lumbar spine  Impression: Findings consistent with mild acute diverticulitis junction of the descending colon and proximal sigmoid colon  No bowel obstruction  Nodule in the right retroperitoneum, anterior to the right kidney, new since the prior study  This may reflect a metastatic deposit  Additional findings as above  Workstation performed: [id number]     XR chest 1 view portable    Result Date: 5/1/2023  Narrative: CHEST INDICATION:   cp/sob  COMPARISON: CT chest 4/22/2023 EXAM PERFORMED/VIEWS:  XR CHEST PORTABLE FINDINGS: Heart shadow appears unremarkable  Atherosclerotic vascular calcifications are noted  Patchy groundglass infiltrates bilaterally, worse from the prior exam  No pneumothorax or pleural effusion  Osseous structures appear within normal limits for patient age       Impression: Patchy groundglass "infiltrates bilaterally, worse from the prior exam  This could be related to atypical pneumonia versus edema  Workstation performed: [id number]     XR chest 1 view portable    Result Date: 4/22/2023  Narrative: CHEST INDICATION:   SOB  COMPARISON:  CXR 4/11/2023 and chest CT 4/18/2023  EXAM PERFORMED/VIEWS:  XR CHEST PORTABLE  FINDINGS: Cardiomediastinal silhouette normal  Lungs clear  No effusion or pneumothorax  Upper abdomen normal  Embolization coils in the left upper quadrant  Bones normal for age  Impression: No acute cardiopulmonary disease  Workstation performed: [id number]     XR chest 1 view portable    Result Date: 4/12/2023  Narrative: CHEST INDICATION:   weakness  COMPARISON:  7/30/2022 EXAM PERFORMED/VIEWS:  XR CHEST PORTABLE Single view FINDINGS: Low lung volumes accentuate markings at the bases Cardiomediastinal silhouette appears unremarkable  The lungs are clear  No pneumothorax or pleural effusion  Osseous structures appear within normal limits for patient age  Impression: Low lung volumes No acute cardiopulmonary disease  Workstation performed: [id number]     CT chest abdomen pelvis w contrast    Result Date: 4/22/2023  Narrative: CT CHEST, ABDOMEN AND PELVIS WITH IV CONTRAST INDICATION:   SOB, diarrhea/recent diverticulitis/assess for complication  \"76 yo M h/o HTN, PE/portal vein thrombosis on Pradaxa, metastatic melanoma (to brain/lung/retroperitoneum), presenting for evaluation of SOB and diarrhea  \"  \"Pt recently admitted for diarrhea/diverticulitis, discharged about 1 week ago, reports sx improved, unsure when they returned  States everything he eats causes immediate diarrhea and is covered in diarrhea on arrival  Denies dark/bloody stools  Reports he thinks he completed his oral abx  \" COMPARISON:  CTA chest PE study 4/18/2023  CT abdomen pelvis 4/11/2023  PET/CT 3/2/2020  TECHNIQUE: CT examination of the chest, abdomen and pelvis was performed   Multiplanar 2D reformatted " images were created from the source data  Radiation dose length product (DLP) for this visit:  846 1 mGy-cm   This examination, like all CT scans performed in the [facility], was performed utilizing techniques to minimize radiation dose exposure, including the use of iterative reconstruction and automated exposure control  IV Contrast:  100 mL of iohexol (OMNIPAQUE) Enteric Contrast: Enteric contrast was administered  FINDINGS: CHEST LUNGS:  Field lungs no acute findings  Mild mosaic attenuation of the pulmonary parenchyma keeping with a chronic small vessel or small airway disease  Lung nodule seen on the recent study are unchanged  No endotracheal or endobronchial lesion  PLEURA:  Unremarkable  HEART/GREAT VESSELS: Coronary artery calcifications  No thoracic aortic aneurysm  MEDIASTINUM AND BRENDA:  Unremarkable  CHEST WALL AND LOWER NECK:  Unremarkable  ABDOMEN LIVER/BILIARY TREE:  Multiple scattered enhancing lesions through the liver suspicious for metastatic disease; for example 2 4 cm segment 8 lesion #2/83 2 4 cm segment 7 lesion #2/70  GALLBLADDER:  Gallbladder is surgically absent  SPLEEN:  Status post splenectomy  Left upper quadrant vascular coils are present medial to the splenectomy bed  PANCREAS:  Unremarkable  ADRENAL GLANDS:  Unremarkable  KIDNEYS/URETERS:  Unremarkable  No hydronephrosis  STOMACH AND BOWEL:  Resolved descending colonic diverticulitis  APPENDIX:  Noninflamed  ABDOMINOPELVIC CAVITY:  No ascites  No pneumoperitoneum  Unchanged 2 1 cm right retroperitoneal nodule posterior to the left kidney, #2/138 there is also a 7 mm retroperitoneal nodule lateral to the right kidney #2/133  Unchanged right extrarenal iliac adenopathy; for example 17 mm right external iliac node #2/181 VESSELS:  Unremarkable for patient's age  PELVIS REPRODUCTIVE ORGANS:  Unremarkable for patient's age  URINARY BLADDER:  Unremarkable   ABDOMINAL WALL/INGUINAL REGIONS:  Bilateral fat-containing inguinal hernias larger on the right  Uncomplicated small fat-containing umbilical hernia  Unchanged right inguinal adenopathy  A dominant node measures 14 mm on #2/250  OSSEOUS STRUCTURES: Known osseous metastases seen on the recent PET/CT are not well visualized on this study  Impression: No acute findings in the chest, abdomen or pelvis  Resolved descending colonic diverticulitis without new bowel findings  Multiple subtle enhancing lesions throughout the liver in keeping with numerous hepatic metastases  Known pulmonary and retroperitoneal metastases  Unchanged right iliac chain and inguinal adenopathy in this patient with metastatic melanoma  Known osseous metastases are not well visualized on this study    The study was marked in EPIC for immediate notification  Workstation performed: [id number]     PE Study with CT Abdomen and Pelvis with contrast    Result Date: 4/30/2023  Narrative: CT PULMONARY ANGIOGRAM OF THE CHEST AND CT ABDOMEN AND PELVIS WITH INTRAVENOUS CONTRAST INDICATION:   hypoxia, active cancer, hx PE, abd pain and diarrhea recent diverticulitis  Metastatic melanoma with osseous metastases COMPARISON: CT 4/22/2023 and priors TECHNIQUE:  CT examination of the chest, abdomen and pelvis was performed  Thin section CT angiographic technique was used in the chest in order to evaluate for pulmonary embolus and coronal 3D MIP postprocessing was performed on the acquisition scanner  Multiplanar 2D reformatted images were created from the source data  Radiation dose length product (DLP) for this visit:  870 mGy-cm   This examination, like all CT scans performed in the [facility], was performed utilizing techniques to minimize radiation dose exposure, including the use of iterative reconstruction and automated exposure control  IV Contrast:  100 mL of iohexol (OMNIPAQUE) Enteric Contrast:  Enteric contrast was not administered   FINDINGS: CHEST PULMONARY ARTERIAL TREE:  No pulmonary embolus is seen  LUNGS: New bilateral lung groundglass opacities and infiltrates, likely inflammatory/infectious  Scattered small lung nodules again visualized, suspicious for metastases  Example 5 mm right lower lung nodule image 6/62  3 mm left upper lung nodule image 6/37  There is no tracheal or endobronchial lesion  PLEURA:  Unremarkable  HEART/AORTA: Coronary artery calcifications  No thoracic aortic aneurysm  MEDIASTINUM AND BRENDA:  Unremarkable  CHEST WALL AND LOWER NECK:  Unremarkable  ABDOMEN LIVER/BILIARY TREE: Scattered liver lesions again visualized, most concerning for metastases  Hepatic steatosis  GALLBLADDER: Gallbladder is surgically absent  SPLEEN: Splenectomy  PANCREAS:  Unremarkable  ADRENAL GLANDS:  Unremarkable  KIDNEYS/URETERS:  Unremarkable  No hydronephrosis  STOMACH AND BOWEL: There is colonic diverticulosis without evidence of acute diverticulitis  APPENDIX:  No findings to suggest appendicitis  ABDOMINOPELVIC CAVITY:  No ascites  No pneumoperitoneum  2 1 x 2 cm metastatic nodule lateral to the right psoas muscle image 2/303  This measured 1 7 x 1 5 cm on prior PET/CT  Small nodule lateral to the lower pole of the right kidney image 2/299, inferior to the right renal pole image 2/303, and lateral to the mid right kidney image 2/273  Metastatic adenopathy in the right pelvis posterior to the internal iliac vessels, as well as right inguinal region, appears stable  Small nodule anterior to the right colon measuring 1 x 1 cm, image 2/293  1 2 x 0 9 cm nodule in the right lower quadrant image 2/325  Additional scattered tiny intra-abdominal nodules are present  VESSELS:  Unremarkable for patient's age  Artifact from metallic vascular coils in the left upper quadrant  PELVIS REPRODUCTIVE ORGANS:  Unremarkable for patient's age  URINARY BLADDER:  Unremarkable  ABDOMINAL WALL/INGUINAL REGIONS: Right inguinal hernia containing fat and bowel  Small fat-containing left inguinal hernia  Fat-containing umbilical hernia  OSSEOUS STRUCTURES:  No acute fracture  Chronic loss of height in the lower thoracic vertebral bodies  Spine degenerative change  Known osseous metastases are better demonstrated on prior PET/CT  Impression: 1  No pulmonary artery emboli  2   New bilateral lung groundglass opacities and infiltrates, likely inflammatory/infectious  3  Scattered small lung nodules again visualized, suspicious for metastases  4  Scattered liver lesions again visualized, most concerning for metastases  5  Multiple intra-abdominal and right inguinal nodules, likely metastatic  6  Known osseous metastases better demonstrated on prior PET/CT  Workstation performed: [id number]     CTA chest pe study    Result Date: 4/18/2023  Narrative: CTA - CHEST WITH IV CONTRAST - PULMONARY ANGIOGRAM INDICATION:   I26 09: Other pulmonary embolism with acute cor pulmonale R06 02: Shortness of breath  COMPARISON: CTA chest PE study 7/31/2022  TECHNIQUE: CTA examination of the chest was performed using angiographic technique according to a protocol specifically tailored to evaluate for pulmonary embolism  Multiplanar 2D reformatted images were created from the source data  In addition, coronal 3D MIP postprocessing was performed on the acquisition scanner  Radiation dose length product (DLP) for this visit:  417 mGy-cm   This examination, like all CT scans performed in the [facility], was performed utilizing techniques to minimize radiation dose exposure, including the use of iterative reconstruction and automated exposure control  IV Contrast:  85 mL of iohexol (OMNIPAQUE)  FINDINGS: PULMONARY ARTERIAL TREE:  No pulmonary embolus is seen  LUNGS:  New 8 x 5 mm right middle lobe nodule #3/82  New 4 mm left upper lobe nodule #3/76  New 4 mm left upper lobe subpleural nodule #3/80  New 5 mm right lower lobe nodule #3/115  New 6 mm right lower lobe nodule #3/105  No endotracheal or endobronchial lesion  Previously described subtle scattered groundglass opacities/mosaic perfusion are slightly less apparent  These are likely to represent a chronic small vessel or small airway disease  PLEURA:  Unremarkable  HEART/GREAT VESSELS:  Unremarkable for patient's age  No thoracic aortic aneurysm  MEDIASTINUM AND BRENDA:  Unremarkable  CHEST WALL AND LOWER NECK:   Unremarkable  VISUALIZED STRUCTURES IN THE UPPER ABDOMEN:  Left upper quadrant vascular coils  Reidentified hepatic steatosis  OSSEOUS STRUCTURES:  No acute fracture or destructive osseous lesion  Impression: No pulmonary arterial embolism or pulmonary infiltrate/consolidation  New scattered pulmonary nodules suspicious for metastases  The study was marked in [facility] for immediate notification  Workstation performed: [id number]     Echo complete w/ contrast if indicated    Result Date: 4/23/2023  Narrative: •  Left Ventricle: Left ventricular cavity size is normal  Wall thickness is normal  The left ventricular ejection fraction is 65%  Systolic function is normal  Wall motion is normal  Diastolic function is normal        Labs and pertinent reports reviewed  This note has been generated by voice recognition software system  Therefore, there may be spelling, grammar, and or syntax errors  Please contact if questions arise

## 2023-05-16 NOTE — ASSESSMENT & PLAN NOTE
Positive BAL for Pneumocystis carinii pneumonia  Risk factors in the includes steroid use in the setting of metastatic malignancy and possible BRAF therapy  · Antibiotic management per infectious disease  Previously on IV TMP-SMX and steroids, now on p o  Bactrim to complete 21-day course through 5/24/2023  · Infectious disease recommending 21 days of adjunctive corticosteroids for his hypoxia    Patient currently on chronic Decadron  ·  IV Bactrim

## 2023-05-16 NOTE — ASSESSMENT & PLAN NOTE
· Patient had a CAT scan done due to abdominal distention    Noted to have pneumoperitoneum concerning for perforation of the viscus-  · Surgery evaluation appreciated-patient is not a good surgical candidate-currently on Zosyn  · Had a discussion with surgery palliative and hematology oncology today-hospice will be the best course of action  · Patient and family have decided for home hospice, plan to discharge home to home hospice tomorrow

## 2023-05-16 NOTE — PROGRESS NOTES
[address] [place] [address]  Progress Note  Name: Lanette Ni  MRN: [id number]  Unit/Bed#: [facility] 2 -01 I Date of Admission: 4/30/2023   Date of Service: 5/16/2023 I Hospital Day: 16    Assessment/Plan   Acute respiratory failure with hypoxia [facility])  Assessment & Plan  50-year-old male presenting with cough, chest tightness, shortness of breath, and generalized weakness likely secondary to Pneomocystis Carinii Pneumonia (Diagnosed through bronchoscopy, BAL positive for this)  • Improved  Currently on room air  • Continue antibiotics per PCP as per ID recommendations  * Perforation of viscus  Assessment & Plan  · Patient had a CAT scan done due to abdominal distention  Noted to have pneumoperitoneum concerning for perforation of the viscus-  · Surgery evaluation appreciated-patient is not a good surgical candidate-currently on Zosyn  · Had a discussion with surgery palliative and hematology oncology today-hospice will be the best course of action  · Patient and family have decided for home hospice, plan to discharge home to home hospice tomorrow     Hallucination  Assessment & Plan  Possible steroids, bactrim, or hyponatremia  Patient reports that hallucinations somehow improved  Pneumocystis carinii pneumonia ([facility] [address] )  Assessment & Plan  Positive BAL for Pneumocystis carinii pneumonia  Risk factors in the includes steroid use in the setting of metastatic malignancy and possible BRAF therapy  · Antibiotic management per infectious disease  Previously on IV TMP-SMX and steroids, now on p o  Bactrim to complete 21-day course through 5/24/2023  · Infectious disease recommending 21 days of adjunctive corticosteroids for his hypoxia  Patient currently on chronic Decadron  ·  IV Bactrim    History of Clostridium difficile infection  Assessment & Plan  Hx of diverticulitis, C  Diff in the past  C  Diff PCR positive, but negative toxins     • Continue to monitor off prophylaxis    Metastatic melanoma [facility])  Assessment & Plan  Biopsy confirmed 3/17/2023  Right peritoneum consistent with malignant melanoma with brain mets  BRAF mutation  · Encorafenib and Binimetinib started April 2023 (Currently on hold)  · With the new diagnosis of perforation/pneumoperitoneum-discussed with hematology oncology  Patient with extensive metastatic disease-currently immunotherapy on hold  Poor prognosis from oncology standpoint    Acute kidney injury [facility])  Assessment & Plan  Possibly due to hydrochlorothiazide, bactrim  · Due to uptrending levels will have renal evaluate him  ·     Recent Labs     05/14/23  0537 05/14/23  2029 05/15/23  0434   BUN 44* 51* 47*   CREATININE 1 72* 1 87* 1 72*   EGFR 39 35 39       Results from last 7 days   Lab Units 05/11/23  1423   BLOOD UA  Large*   PROTEIN UA mg/dl 70 (1+)*         Autoimmune hemolytic anemia  Assessment & Plan  History of warm antibody hemolytic anemia  Previously treated with Rituxan (8/2022 last received)  • Continue chronic steroid treatment with Dexamethasone  • Previous team spoke to hematology-oncology (Haylee Esteves), May taper Dexamethasone from 4mg Q8 hours to Q12 hours due to side effects (Hallucinations)  (Dexamethasone is for the mets to his brain, previously on prednisone for the hemolytic anemia)  • Appreciate hematology-oncology recommendations  History of pulmonary embolism  Assessment & Plan  Maintained on Pradaxa             VTE Pharmacologic Prophylaxis:   Pharmacologic: pradaxa    Patient Centered Rounds: I have performed bedside rounds with nursing staff today  Education and Discussions with Family / Patient: Spouse at bedside    Time Spent for Care: More than 50% of total time spent on counseling and coordination of care as described above      Current Length of Stay: 16 day(s)    Current Patient Status: Inpatient   Certification Statement: The patient will continue to require additional inpatient hospital stay due to Home with home hospice 2023    Discharge Plan / Estimated Discharge Date: 24h    Code Status: Level 3 - DNAR and DNI      Subjective:   Patient seen and examined at bedside, complains of abdominal pain however controlled right now  Asking for jennifer  Understands his risk of eating and making infection worse  Accepting this risk as he is under hospice now  Objective:     Vitals:   Temp (24hrs), Av 7 °F (36 5 °C), Min:97 3 °F (36 3 °C), Max:98 °F (36 7 °C)    Temp:  [97 3 °F (36 3 °C)-98 °F (36 7 °C)] 97 3 °F (36 3 °C)  HR:  [] 78  Resp:  [20-24] 20  BP: (111-139)/() 112/69  SpO2:  [91 %-94 %] 94 %  Body mass index is 29 36 kg/m²  Input and Output Summary (last 24 hours):     No intake or output data in the 24 hours ending 23    Physical Exam:    Constitutional: Patient is oriented to person, place and time, no acute distress  HEENT:  Normocephalic, atraumatic  Cardiovascular: Normal S1S2, RRR, No murmurs/rubs/gallops appreciated  Pulmonary:  Bilateral air entry, No rhonchi/rales/wheezing appreciated  Abdominal: Soft, Bowel sounds present, Non-tender, distended  Extremities:  No cyanosis, clubbing or edema  Neurological: Awake, alert  Skin:  Warm, dry    Additional Data:     Labs:    Results from last 7 days   Lab Units 05/15/23  043   WBC Thousand/uL 22 99*   HEMOGLOBIN g/dL 9 9*   HEMATOCRIT % 30 3*   PLATELETS Thousands/uL 499*   NEUTROS PCT % 80*   LYMPHS PCT % 11*   MONOS PCT % 5   EOS PCT % 2     Results from last 7 days   Lab Units 05/15/23  0434 23   POTASSIUM mmol/L 4 2 4 7   CHLORIDE mmol/L 99 96   CO2 mmol/L 21 20*   BUN mg/dL 47* 51*   CREATININE mg/dL 1 72* 1 87*   CALCIUM mg/dL 8 7 9 1   ALK PHOS U/L  --  94   ALT U/L  --  35   AST U/L  --  32     Results from last 7 days   Lab Units 23   INR  1 35*        I Have Reviewed All Lab Data Listed Above      Invasive Devices     Peripheral Intravenous Line  Duration           Peripheral IV 05/15/23 Right;Ventral (anterior) Forearm 1 day                     Recent Cultures (last 7 days):           Last 24 Hours Medication List:   Current Facility-Administered Medications   Medication Dose Route Frequency Provider Last Rate   • acetaminophen  650 mg Oral Q6H PRN Alex Alexander MD     • albuterol  2 puff Inhalation Q4H PRN Alex Alexander MD     • ALPRAZolam  0 5 mg Oral HS PRN Alex Alexander MD     • aluminum-magnesium hydroxide-simethicone  30 mL Oral Q6H PRN Alex Alexander MD     • benzonatate  100 mg Oral TID PRN Alex Alexander MD     • dexamethasone  2 mg Intravenous Q12H Erika Plata MD     • dextromethorphan-guaiFENesin  10 mL Oral Q4H PRN Alex Alexander MD     • HYDROmorphone  0 5 mg Intravenous Q4H PRN Rizwana Peacockires, DO     • metoprolol  2 5 mg Intravenous Q6H PRN Carlee Lugo MD     • ondansetron  4 mg Intravenous Q6H PRN Alex Alexander MD     • oxyCODONE  10 mg Oral Q4H PRN Halle Williamson MD     • oxyCODONE  5 mg Oral Q4H PRN Halle Williamson MD     • pantoprazole  40 mg Intravenous Q24H [address] Carlee Lugo MD     • piperacillin-tazobactam  3 375 g Intravenous Q6H Ronelle Ying, DO 3 375 g (05/16/23 1451)   • polyethylene glycol  17 g Oral Daily PRN Alex Alexander MD     • sodium chloride  150 mL/hr Intravenous Continuous Ronelle Ying,  mL/hr (05/15/23 2030)   • sulfamethoxazole-trimethoprim  320 mg of trimethoprim Intravenous Aurelia Joseph  mg of trimethoprim (05/16/23 1233)        Today, Patient Was Seen By: Kendall Krueger MD

## 2023-05-16 NOTE — CASE MANAGEMENT
Case Management Discharge Planning Note    Patient name Adal Calhoun  Location [facility] [place]* MRN [id number]  : 1955 Date 2023       Current Admission Date: 2023  Current Admission Diagnosis:Acute respiratory failure with hypoxia [facility])   Patient Active Problem List    Diagnosis Date Noted   • Perforation of viscus 05/15/2023   • Hallucination 05/10/2023   • Hyponatremia 2023   • Pneumocystis carinii pneumonia ([facility] [address] ) 2023   • Ground glass opacity present on imaging of lung 2023   • Generalized weakness 2023   • History of Clostridium difficile infection 2023   • Melanoma ([facility] [address] ) 2023   • Encounter for follow-up surveillance of melanoma 2023   • Lymphocele after surgical procedure 10/06/2022   • Elevated serum creatinine 2022   • Elevated bilirubin 2022   • Leukocytosis 2022   • Dyspnea 2022   • Acute respiratory failure with hypoxia ([facility] [address] ) 2022   • Metastatic melanoma ([facility] [address] ) 2022   • Hypercholesterolemia 2021   • Chronic bilateral low back pain with bilateral sciatica 10/08/2021   • Hyperglycemia 2021   • Primary localized osteoarthrosis of the knee, right 2021   • Thrombocytosis 2021   • COVID-19 2020   • Pain in joint, lower leg 11/10/2020   • Primary osteoarthritis of left knee 2020   • Pain in left knee 2020   • Auto immune neutropenia ([facility]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   • Glucose intolerance (impaired glucose tolerance) 2020   • Renal insufficiency 2020   • Essential hypertension 2019   • Posttraumatic stress disorder 10/28/2019   • Iron overload due to repeated red blood cell transfusions 2018   • Sepsis ([address] ) 2018   • Acute kidney injury ([facility] [address] ) 2018   • Autoimmune hemolytic anemia    • Shortness of breath 2017   • Gastroesophageal reflux disease 2017   • Elevated blood pressure reading without diagnosis of hypertension 2017   • Portal vein thrombosis 06/08/2017   • History of pulmonary embolism 06/06/2017   • Splenomegaly 05/02/2017   • Symptomatic anemia 02/03/2017   • 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   • Hyperbilirubinemia 11/02/2016      LOS (days): 16  Geometric Mean LOS (GMLOS) (days): 5 20  Days to GMLOS:-10 5     OBJECTIVE:  Risk of Unplanned Readmission Score: 39 36         Current admission status: Inpatient   Preferred Pharmacy:   [id number] - [address] [facility], [facility] [address]  [address] [address]  Phone: [phone] Fax: [address], [address] [facility],  [place] [address] , [address] [address]  Phone: [phone] Fax: [phone]    Primary Care Provider: Rodri Roe DO    Primary Insurance: MEDICARE  Secondary Insurance: AARP    DISCHARGE DETAILS:                           Contacts  Patient Contacts: Sandy Patricia  Relationship to Patient[de-identified] Family  Contact Method: In Person  Reason/Outcome: Discharge Planning                   Would you like to participate in our [address] service program?  :  (hospice meds sent to [facility] w/ wife to  this evening for d/c Wednesday day home w/ hospice)    Treatment Team Recommendation: Hospice, Home  Discharge Destination Plan[de-identified] Home, Hospice ([facility])  Transport at Discharge : [facility] Ambulance     Number/Name of Dispatcher: Roundtrip  Transported by [employer] and Unit #): SLETS  ETA of Transport (Date): 05/16/23  ETA of Transport (Time): 1130                       Additional Comments: Pt and pt's wife met with hospice liaison with decision for pt to discharge home w/ [facility]- paperwork completed and placed in pt's dc folder- hospice equipment to be delivered before 11am Wednesday with request for transport to be arranged for 1130 - same done awaiting finalized  time  will continue to follow to notify wife/hospice of transport time

## 2023-05-16 NOTE — HOSPICE NOTE
Hospice referral received  Patient is approved for routine/home hospice services  Liaison met with patient and wife a bedside  Hospice care and services explained and questions answered  Consents signed for hospice starting tomorrow 5/17/23 when RN admits him at home  Copy given to  for transport  Wife will  Oxycodone and ativan PRN at [address] while here visiting  Update liaison with  time tomorrow

## 2023-05-16 NOTE — OCCUPATIONAL THERAPY NOTE
Occupational Therapy Cancel Note:    Patient Name: Maggi Sawyer  BAQSV'H Date: 5/16/2023    Chart reviewed  Pt approved for routine/home hospice with home hospice starting tomorrow, 5/17/23  No further acute skilled OT needs identified at this time  Will D/C OT  Please re-consult as needed      SUBHASH Amato/AMARI

## 2023-05-16 NOTE — PLAN OF CARE
Problem: Potential for Falls  Goal: Patient will remain free of falls  Description: INTERVENTIONS:  - Educate patient/family on patient safety including physical limitations  - Instruct patient to call for assistance with activity   - Consult OT/PT to assist with strengthening/mobility   - Keep Call bell within reach  - Keep bed low and locked with side rails adjusted as appropriate  - Keep care items and personal belongings within reach  - Initiate and maintain comfort rounds  - Apply yellow socks and bracelet for high fall risk patients  - Consider moving patient to room near nurses station  Outcome: Progressing     Problem: MOBILITY - ADULT  Goal: Maintain or return to baseline ADL function  Description: INTERVENTIONS:  - Educate patient/family on patient safety including physical limitations  - Instruct patient to call for assistance with activity   - Consult OT/PT to assist with strengthening/mobility   - Keep Call bell within reach  - Keep bed low and locked with side rails adjusted as appropriate  - Keep care items and personal belongings within reach  - Initiate and maintain comfort rounds  - Initiate/Maintain bed alarm  - Obtain necessary fall risk management equipment:   - Apply yellow socks and bracelet for high fall risk patients  - Consider moving patient to room near nurses station  Outcome: Progressing  Goal: Maintains/Returns to pre admission functional level  Description: INTERVENTIONS:  - Perform BMAT or MOVE assessment daily    - Set and communicate daily mobility goal to care team and patient/family/caregiver     - Collaborate with rehabilitation services on mobility goals if consulted  - Out of bed for toileting  - Record patient progress and toleration of activity level   Outcome: Progressing     Problem: INFECTION - ADULT  Goal: Absence or prevention of progression during hospitalization  Description: INTERVENTIONS:  - Assess and monitor for signs and symptoms of infection  - Monitor lab/diagnostic results  - Monitor all insertion sites, i e  indwelling lines, tubes, and drains  - Monitor endotracheal if appropriate and nasal secretions for changes in amount and color  - [place] appropriate cooling/warming therapies per order  - Administer medications as ordered  - Instruct and encourage patient and family to use good hand hygiene technique  - Identify and instruct in appropriate isolation precautions for identified infection/condition  Outcome: Progressing  Goal: Absence of fever/infection during neutropenic period  Description: INTERVENTIONS:  - Monitor WBC    Outcome: Progressing     Problem: SAFETY ADULT  Goal: Patient will remain free of falls  Description: INTERVENTIONS:  - Educate patient/family on patient safety including physical limitations  - Instruct patient to call for assistance with activity   - Consult OT/PT to assist with strengthening/mobility   - Keep Call bell within reach  - Keep bed low and locked with side rails adjusted as appropriate  - Keep care items and personal belongings within reach  - Initiate and maintain comfort rounds  - Apply yellow socks and bracelet for high fall risk patients  - Consider moving patient to room near nurses station  Outcome: Progressing  Goal: Maintain or return to baseline ADL function  Description: INTERVENTIONS:  - Educate patient/family on patient safety including physical limitations  - Instruct patient to call for assistance with activity   - Consult OT/PT to assist with strengthening/mobility   - Keep Call bell within reach  - Keep bed low and locked with side rails adjusted as appropriate  - Keep care items and personal belongings within reach  - Initiate and maintain comfort rounds  - Initiate/Maintain bed alarm  - Obtain necessary fall risk management equipment:   - Apply yellow socks and bracelet for high fall risk patients  - Consider moving patient to room near nurses station  Outcome: Progressing  Goal: Maintains/Returns to pre admission functional level  Description: INTERVENTIONS:  - Perform BMAT or MOVE assessment daily    - Set and communicate daily mobility goal to care team and patient/family/caregiver     - Collaborate with rehabilitation services on mobility goals if consulted  - Out of bed for toileting  - Record patient progress and toleration of activity level   Outcome: Progressing     Problem: CARDIOVASCULAR - ADULT  Goal: Maintains optimal cardiac output and hemodynamic stability  Description: INTERVENTIONS:  - Monitor I/O, vital signs and rhythm  - Monitor for S/S and trends of decreased cardiac output  - Administer and titrate ordered vasoactive medications to optimize hemodynamic stability  - Assess quality of pulses, skin color and temperature  - Assess for signs of decreased coronary artery perfusion  - Instruct patient to report change in severity of symptoms  Outcome: Progressing  Goal: Absence of cardiac dysrhythmias or at baseline rhythm  Description: INTERVENTIONS:  - Continuous cardiac monitoring, vital signs, obtain 12 lead EKG if ordered  - Administer antiarrhythmic and heart rate control medications as ordered  - Monitor electrolytes and administer replacement therapy as ordered  Outcome: Progressing     Problem: RESPIRATORY - ADULT  Goal: Achieves optimal ventilation and oxygenation  Description: INTERVENTIONS:  - Assess for changes in respiratory status  - Assess for changes in mentation and behavior  - Position to facilitate oxygenation and minimize respiratory effort  - Oxygen administered by appropriate delivery if ordered  - Initiate smoking cessation education as indicated  - Encourage broncho-pulmonary hygiene including cough, deep breathe, Incentive Spirometry  - Assess the need for suctioning and aspirate as needed  - Assess and instruct to report SOB or any respiratory difficulty  - Respiratory Therapy support as indicated  Outcome: Progressing     Problem: GASTROINTESTINAL - ADULT  Goal: Minimal or absence of nausea and/or vomiting  Description: INTERVENTIONS:  - Administer IV fluids if ordered to ensure adequate hydration  - Maintain NPO status until nausea and vomiting are resolved  - Nasogastric tube if ordered  - Administer ordered antiemetic medications as needed  - Provide nonpharmacologic comfort measures as appropriate  - Advance diet as tolerated, if ordered  - Consider nutrition services referral to assist patient with adequate nutrition and appropriate food choices  Outcome: Progressing  Goal: Maintains or returns to baseline bowel function  Description: INTERVENTIONS:  - Assess bowel function  - Encourage oral fluids to ensure adequate hydration  - Administer IV fluids if ordered to ensure adequate hydration  - Administer ordered medications as needed  - Encourage mobilization and activity  - Consider nutritional services referral to assist patient with adequate nutrition and appropriate food choices  Outcome: Progressing  Goal: Maintains adequate nutritional intake  Description: INTERVENTIONS:  - Monitor percentage of each meal consumed  - Identify factors contributing to decreased intake, treat as appropriate  - Assist with meals as needed  - Monitor I&O, weight, and lab values if indicated  - Obtain nutrition services referral as needed  Outcome: Progressing  Goal: Establish and maintain optimal ostomy function  Description: INTERVENTIONS:  - Assess bowel function  - Encourage oral fluids to ensure adequate hydration  - Administer IV fluids if ordered to ensure adequate hydration   - Administer ordered medications as needed  - Encourage mobilization and activity  - Nutrition services referral to assist patient with appropriate food choices  - Assess stoma site  - Consider wound care consult   Outcome: Progressing  Goal: Oral mucous membranes remain intact  Description: INTERVENTIONS  - Assess oral mucosa and hygiene practices  - Implement preventative oral hygiene regimen  - Implement oral medicated treatments as ordered  - Initiate Nutrition services referral as needed  Outcome: Progressing     Problem: METABOLIC, FLUID AND ELECTROLYTES - ADULT  Goal: Electrolytes maintained within normal limits  Description: INTERVENTIONS:  - Monitor labs and assess patient for signs and symptoms of electrolyte imbalances  - Administer electrolyte replacement as ordered  - Monitor response to electrolyte replacements, including repeat lab results as appropriate  - Instruct patient on fluid and nutrition as appropriate  Outcome: Progressing  Goal: Fluid balance maintained  Description: INTERVENTIONS:  - Monitor labs   - Monitor I/O and WT  - Instruct patient on fluid and nutrition as appropriate  - Assess for signs & symptoms of volume excess or deficit  Outcome: Progressing  Goal: Glucose maintained within target range  Description: INTERVENTIONS:  - Monitor Blood Glucose as ordered  - Assess for signs and symptoms of hyperglycemia and hypoglycemia  - Administer ordered medications to maintain glucose within target range  - Assess nutritional intake and initiate nutrition service referral as needed  Outcome: Progressing     Problem: Prexisting or High Potential for Compromised Skin Integrity  Goal: Skin integrity is maintained or improved  Description: INTERVENTIONS:  - Identify patients at risk for skin breakdown  - Assess and monitor skin integrity  - Assess and monitor nutrition and hydration status  - Monitor labs   - Assess for incontinence   - Turn and reposition patient  - Assist with mobility/ambulation  - Relieve pressure over bony prominences  - Avoid friction and shearing  - Provide appropriate hygiene as needed including keeping skin clean and dry  - Evaluate need for skin moisturizer/barrier cream  - Collaborate with interdisciplinary team   - Patient/family teaching  - Consider wound care consult   Outcome: Progressing     Problem: Nutrition/Hydration-ADULT  Goal: Nutrient/Hydration intake appropriate for improving, restoring or maintaining nutritional needs  Description: Monitor and assess patient's nutrition/hydration status for malnutrition  Collaborate with interdisciplinary team and initiate plan and interventions as ordered  Monitor patient's weight and dietary intake as ordered or per policy  Utilize nutrition screening tool and intervene as necessary  Determine patient's food preferences and provide high-protein, high-caloric foods as appropriate       INTERVENTIONS:  - Monitor oral intake, urinary output, labs, and treatment plans  - Assess nutrition and hydration status and recommend course of action  - Evaluate amount of meals eaten  - Assist patient with eating if necessary   - Allow adequate time for meals  - Recommend/ encourage appropriate diets, oral nutritional supplements, and vitamin/mineral supplements  - Order, calculate, and assess calorie counts as needed  - Recommend, monitor, and adjust tube feedings and TPN/PPN based on assessed needs  - Assess need for intravenous fluids  - Provide specific nutrition/hydration education as appropriate  - Include patient/family/caregiver in decisions related to nutrition  Outcome: Progressing

## 2023-05-16 NOTE — ASSESSMENT & PLAN NOTE
History of warm antibody hemolytic anemia  Previously treated with Rituxan (8/2022 last received)  • Continue chronic steroid treatment with Dexamethasone  • Previous team spoke to hematology-oncology (Qi Austin), May taper Dexamethasone from 4mg Q8 hours to Q12 hours due to side effects (Hallucinations)  (Dexamethasone is for the mets to his brain, previously on prednisone for the hemolytic anemia)  • Appreciate hematology-oncology recommendations

## 2023-05-17 NOTE — ASSESSMENT & PLAN NOTE
Possibly due to hydrochlorothiazide, bactrim  Nephrology closely followed    Recent Labs     05/14/23  2029 05/15/23  0434   BUN 51* 47*   CREATININE 1 87* 1 72*   EGFR 35 39       Results from last 7 days   Lab Units 05/11/23  1423   BLOOD UA  Large*   PROTEIN UA mg/dl 70 (1+)*

## 2023-05-17 NOTE — ASSESSMENT & PLAN NOTE
Positive BAL for Pneumocystis carinii pneumonia  Risk factors in the includes steroid use in the setting of metastatic malignancy and possible BRAF therapy  · Antibiotic management per infectious disease  Previously on IV TMP-SMX and steroids, now on p o   Bactrim to complete 21-day course through 5/24/2023  · Infectious disease input was appreciated  · Patient will be discharged home with home hospice

## 2023-05-17 NOTE — ASSESSMENT & PLAN NOTE
65-year-old male presenting with cough, chest tightness, shortness of breath, and generalized weakness likely secondary to Pneomocystis Carinii Pneumonia (Diagnosed through bronchoscopy, BAL positive for this)  • Improved   Currently on room air

## 2023-05-17 NOTE — ASSESSMENT & PLAN NOTE
Sodium levels downtrending  Hydrohlothiazide held     · Fluid restriction 1500    Recent Labs     05/14/23  2029 05/15/23  0434   SODIUM 127* 130*     Results from last 7 days   Lab Units 05/12/23  0504   OSMOLALITY, SERUM mmol/*

## 2023-05-17 NOTE — PLAN OF CARE
Problem: Potential for Falls  Goal: Patient will remain free of falls  Description: INTERVENTIONS:  - Educate patient/family on patient safety including physical limitations  - Instruct patient to call for assistance with activity   - Consult OT/PT to assist with strengthening/mobility   - Keep Call bell within reach  - Keep bed low and locked with side rails adjusted as appropriate  - Keep care items and personal belongings within reach  - Initiate and maintain comfort rounds  - Apply yellow socks and bracelet for high fall risk patients  - Consider moving patient to room near nurses station  Outcome: Progressing     Problem: MOBILITY - ADULT  Goal: Maintain or return to baseline ADL function  Description: INTERVENTIONS:  - Educate patient/family on patient safety including physical limitations  - Instruct patient to call for assistance with activity   - Consult OT/PT to assist with strengthening/mobility   - Keep Call bell within reach  - Keep bed low and locked with side rails adjusted as appropriate  - Keep care items and personal belongings within reach  - Initiate and maintain comfort rounds  - Initiate/Maintain bed alarm  - Obtain necessary fall risk management equipment:   - Apply yellow socks and bracelet for high fall risk patients  - Consider moving patient to room near nurses station  Outcome: Progressing  Goal: Maintains/Returns to pre admission functional level  Description: INTERVENTIONS:  - Perform BMAT or MOVE assessment daily    - Set and communicate daily mobility goal to care team and patient/family/caregiver     - Collaborate with rehabilitation services on mobility goals if consulted  - Out of bed for toileting  - Record patient progress and toleration of activity level   Outcome: Progressing     Problem: INFECTION - ADULT  Goal: Absence or prevention of progression during hospitalization  Description: INTERVENTIONS:  - Assess and monitor for signs and symptoms of infection  - Monitor lab/diagnostic results  - Monitor all insertion sites, i e  indwelling lines, tubes, and drains  - Monitor endotracheal if appropriate and nasal secretions for changes in amount and color  - [place] appropriate cooling/warming therapies per order  - Administer medications as ordered  - Instruct and encourage patient and family to use good hand hygiene technique  - Identify and instruct in appropriate isolation precautions for identified infection/condition  Outcome: Progressing  Goal: Absence of fever/infection during neutropenic period  Description: INTERVENTIONS:  - Monitor WBC    Outcome: Progressing     Problem: SAFETY ADULT  Goal: Patient will remain free of falls  Description: INTERVENTIONS:  - Educate patient/family on patient safety including physical limitations  - Instruct patient to call for assistance with activity   - Consult OT/PT to assist with strengthening/mobility   - Keep Call bell within reach  - Keep bed low and locked with side rails adjusted as appropriate  - Keep care items and personal belongings within reach  - Initiate and maintain comfort rounds  - Apply yellow socks and bracelet for high fall risk patients  - Consider moving patient to room near nurses station  Outcome: Progressing  Goal: Maintain or return to baseline ADL function  Description: INTERVENTIONS:  - Educate patient/family on patient safety including physical limitations  - Instruct patient to call for assistance with activity   - Consult OT/PT to assist with strengthening/mobility   - Keep Call bell within reach  - Keep bed low and locked with side rails adjusted as appropriate  - Keep care items and personal belongings within reach  - Initiate and maintain comfort rounds  - Initiate/Maintain bed alarm  - Obtain necessary fall risk management equipment:   - Apply yellow socks and bracelet for high fall risk patients  - Consider moving patient to room near nurses station  Outcome: Progressing  Goal: Maintains/Returns to pre admission functional level  Description: INTERVENTIONS:  - Perform BMAT or MOVE assessment daily    - Set and communicate daily mobility goal to care team and patient/family/caregiver     - Collaborate with rehabilitation services on mobility goals if consulted  - Out of bed for toileting  - Record patient progress and toleration of activity level   Outcome: Progressing     Problem: CARDIOVASCULAR - ADULT  Goal: Maintains optimal cardiac output and hemodynamic stability  Description: INTERVENTIONS:  - Monitor I/O, vital signs and rhythm  - Monitor for S/S and trends of decreased cardiac output  - Administer and titrate ordered vasoactive medications to optimize hemodynamic stability  - Assess quality of pulses, skin color and temperature  - Assess for signs of decreased coronary artery perfusion  - Instruct patient to report change in severity of symptoms  Outcome: Progressing  Goal: Absence of cardiac dysrhythmias or at baseline rhythm  Description: INTERVENTIONS:  - Continuous cardiac monitoring, vital signs, obtain 12 lead EKG if ordered  - Administer antiarrhythmic and heart rate control medications as ordered  - Monitor electrolytes and administer replacement therapy as ordered  Outcome: Progressing     Problem: RESPIRATORY - ADULT  Goal: Achieves optimal ventilation and oxygenation  Description: INTERVENTIONS:  - Assess for changes in respiratory status  - Assess for changes in mentation and behavior  - Position to facilitate oxygenation and minimize respiratory effort  - Oxygen administered by appropriate delivery if ordered  - Initiate smoking cessation education as indicated  - Encourage broncho-pulmonary hygiene including cough, deep breathe, Incentive Spirometry  - Assess the need for suctioning and aspirate as needed  - Assess and instruct to report SOB or any respiratory difficulty  - Respiratory Therapy support as indicated  Outcome: Progressing     Problem: GASTROINTESTINAL - ADULT  Goal: Minimal or absence of nausea and/or vomiting  Description: INTERVENTIONS:  - Administer IV fluids if ordered to ensure adequate hydration  - Maintain NPO status until nausea and vomiting are resolved  - Nasogastric tube if ordered  - Administer ordered antiemetic medications as needed  - Provide nonpharmacologic comfort measures as appropriate  - Advance diet as tolerated, if ordered  - Consider nutrition services referral to assist patient with adequate nutrition and appropriate food choices  Outcome: Progressing  Goal: Maintains or returns to baseline bowel function  Description: INTERVENTIONS:  - Assess bowel function  - Encourage oral fluids to ensure adequate hydration  - Administer IV fluids if ordered to ensure adequate hydration  - Administer ordered medications as needed  - Encourage mobilization and activity  - Consider nutritional services referral to assist patient with adequate nutrition and appropriate food choices  Outcome: Progressing  Goal: Maintains adequate nutritional intake  Description: INTERVENTIONS:  - Monitor percentage of each meal consumed  - Identify factors contributing to decreased intake, treat as appropriate  - Assist with meals as needed  - Monitor I&O, weight, and lab values if indicated  - Obtain nutrition services referral as needed  Outcome: Progressing  Goal: Establish and maintain optimal ostomy function  Description: INTERVENTIONS:  - Assess bowel function  - Encourage oral fluids to ensure adequate hydration  - Administer IV fluids if ordered to ensure adequate hydration   - Administer ordered medications as needed  - Encourage mobilization and activity  - Nutrition services referral to assist patient with appropriate food choices  - Assess stoma site  - Consider wound care consult   Outcome: Progressing  Goal: Oral mucous membranes remain intact  Description: INTERVENTIONS  - Assess oral mucosa and hygiene practices  - Implement preventative oral hygiene regimen  - Implement oral medicated treatments as ordered  - Initiate Nutrition services referral as needed  Outcome: Progressing     Problem: METABOLIC, FLUID AND ELECTROLYTES - ADULT  Goal: Electrolytes maintained within normal limits  Description: INTERVENTIONS:  - Monitor labs and assess patient for signs and symptoms of electrolyte imbalances  - Administer electrolyte replacement as ordered  - Monitor response to electrolyte replacements, including repeat lab results as appropriate  - Instruct patient on fluid and nutrition as appropriate  Outcome: Progressing  Goal: Fluid balance maintained  Description: INTERVENTIONS:  - Monitor labs   - Monitor I/O and WT  - Instruct patient on fluid and nutrition as appropriate  - Assess for signs & symptoms of volume excess or deficit  Outcome: Progressing  Goal: Glucose maintained within target range  Description: INTERVENTIONS:  - Monitor Blood Glucose as ordered  - Assess for signs and symptoms of hyperglycemia and hypoglycemia  - Administer ordered medications to maintain glucose within target range  - Assess nutritional intake and initiate nutrition service referral as needed  Outcome: Progressing     Problem: Prexisting or High Potential for Compromised Skin Integrity  Goal: Skin integrity is maintained or improved  Description: INTERVENTIONS:  - Identify patients at risk for skin breakdown  - Assess and monitor skin integrity  - Assess and monitor nutrition and hydration status  - Monitor labs   - Assess for incontinence   - Turn and reposition patient  - Assist with mobility/ambulation  - Relieve pressure over bony prominences  - Avoid friction and shearing  - Provide appropriate hygiene as needed including keeping skin clean and dry  - Evaluate need for skin moisturizer/barrier cream  - Collaborate with interdisciplinary team   - Patient/family teaching  - Consider wound care consult   Outcome: Progressing     Problem: Nutrition/Hydration-ADULT  Goal: Nutrient/Hydration intake appropriate for improving, restoring or maintaining nutritional needs  Description: Monitor and assess patient's nutrition/hydration status for malnutrition  Collaborate with interdisciplinary team and initiate plan and interventions as ordered  Monitor patient's weight and dietary intake as ordered or per policy  Utilize nutrition screening tool and intervene as necessary  Determine patient's food preferences and provide high-protein, high-caloric foods as appropriate       INTERVENTIONS:  - Monitor oral intake, urinary output, labs, and treatment plans  - Assess nutrition and hydration status and recommend course of action  - Evaluate amount of meals eaten  - Assist patient with eating if necessary   - Allow adequate time for meals  - Recommend/ encourage appropriate diets, oral nutritional supplements, and vitamin/mineral supplements  - Order, calculate, and assess calorie counts as needed  - Recommend, monitor, and adjust tube feedings and TPN/PPN based on assessed needs  - Assess need for intravenous fluids  - Provide specific nutrition/hydration education as appropriate  - Include patient/family/caregiver in decisions related to nutrition  Outcome: Progressing

## 2023-05-17 NOTE — CASE MANAGEMENT
Case Management Discharge Planning Note    Patient name Vasu Fregoso  Location [facility] 2 /South 2 Дмитрий Old* MRN [id number]  : 1955 Date 2023       Current Admission Date: 2023  Current Admission Diagnosis:Perforation of viscus   Patient Active Problem List    Diagnosis Date Noted   • Perforation of viscus 05/15/2023   • Hallucination 05/10/2023   • Hyponatremia 2023   • Pneumocystis carinii pneumonia ([facility] ) 2023   • Ground glass opacity present on imaging of lung 2023   • Generalized weakness 2023   • History of Clostridium difficile infection 2023   • Melanoma ([facility] ) 2023   • Encounter for follow-up surveillance of melanoma 2023   • Lymphocele after surgical procedure 10/06/2022   • Elevated serum creatinine 2022   • Elevated bilirubin 2022   • Leukocytosis 2022   • Dyspnea 2022   • Acute respiratory failure with hypoxia ([facility] ) 2022   • Metastatic melanoma ([facility] ) 2022   • Hypercholesterolemia 2021   • Chronic bilateral low back pain with bilateral sciatica 10/08/2021   • Hyperglycemia 2021   • Primary localized osteoarthrosis of the knee, right 2021   • Thrombocytosis 2021   • COVID-19 2020   • Pain in joint, lower leg 11/10/2020   • Primary osteoarthritis of left knee 2020   • Pain in left knee 2020   • Auto immune neutropenia (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   • Glucose intolerance (impaired glucose tolerance) 2020   • Renal insufficiency 2020   • Essential hypertension 2019   • Posttraumatic stress disorder 10/28/2019   • Iron overload due to repeated red blood cell transfusions 2018   • Sepsis ([address] ) 2018   • Acute kidney injury ([facility] ) 2018   • Autoimmune hemolytic anemia    • Shortness of breath 2017   • Gastroesophageal reflux disease 2017   • Elevated blood pressure reading without diagnosis of hypertension 2017   • Portal vein thrombosis 06/08/2017   • History of pulmonary embolism 06/06/2017   • Splenomegaly 05/02/2017   • Symptomatic anemia 02/03/2017   • 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, [address] [address] , [address] [address]  Phone: [phone] Fax: [phone]    Primary Care Provider: Rodri Roe DO    Primary Insurance: MEDICARE  Secondary Insurance: AARP    DISCHARGE DETAILS:                           Contacts  Patient Contacts: Sandy Patricia  Relationship to Patient[de-identified] Family  Contact Method: Phone  Phone Number: [phone]  Reason/Outcome: Discharge Planning, Other (Comment) (transport time)

## 2023-05-17 NOTE — ASSESSMENT & PLAN NOTE
History of warm antibody hemolytic anemia  Previously treated with Rituxan (8/2022 last received)  • Continue chronic steroid treatment with Dexamethasone  • Previous team spoke to hematology-oncology (Elis Siegel), May taper Dexamethasone from 4mg Q8 hours to Q12 hours due to side effects (Hallucinations)  (Dexamethasone is for the mets to his brain, previously on prednisone for the hemolytic anemia)  • Appreciate hematology-oncology recommendations

## 2023-05-17 NOTE — DISCHARGE SUMMARY
[address] [facility]  Discharge- Eric Fent 1955, 70 y o  male MRN: [id number]  Unit/Bed#: Peter [facility] [address] Encounter: [id number]  Primary Care Provider: Alex Abdi DO   Date and time admitted to hospital: 4/30/2023  2:55 PM    Acute respiratory failure with hypoxia [facility])  Assessment & Plan  79-year-old male presenting with cough, chest tightness, shortness of breath, and generalized weakness likely secondary to Pneomocystis Carinii Pneumonia (Diagnosed through bronchoscopy, BAL positive for this)  • Improved  Currently on room air    * Perforation of viscus  Assessment & Plan  · Patient had a CAT scan done due to abdominal distention  Noted to have pneumoperitoneum concerning for perforation of the viscus-  · Surgery evaluation appreciated-patient is not a good surgical candidate-currently on Zosyn  · Had a discussion with surgery palliative and hematology oncology today-hospice will be the best course of action  · Patient and family have decided for home hospice, plan to discharge home to home hospice     Hallucination  Assessment & Plan  Possible steroids, bactrim, or hyponatremia  Patient reports that hallucinations somehow improved  Hyponatremia  Assessment & Plan  Sodium levels downtrending  Hydrohlothiazide held  · Fluid restriction 1500    Recent Labs     05/14/23  2029 05/15/23  0434   SODIUM 127* 130*     Results from last 7 days   Lab Units 05/12/23  0504   OSMOLALITY, SERUM mmol/*         Pneumocystis carinii pneumonia ([facility] )  Assessment & Plan  Positive BAL for Pneumocystis carinii pneumonia  Risk factors in the includes steroid use in the setting of metastatic malignancy and possible BRAF therapy  · Antibiotic management per infectious disease  Previously on IV TMP-SMX and steroids, now on p o   Bactrim to complete 21-day course through 5/24/2023  · Infectious disease input was appreciated  · Patient will be discharged home with home hospice    History of Clostridium difficile infection  Assessment & Plan  Hx of diverticulitis, C  Diff in the past  C  Diff PCR positive, but negative toxins  • Continue to monitor off prophylaxis    Metastatic melanoma [facility])  Assessment & Plan  Biopsy confirmed 3/17/2023  Right peritoneum consistent with malignant melanoma with brain mets  BRAF mutation  · Encorafenib and Binimetinib started April 2023 (Currently on hold)  · With the new diagnosis of perforation/pneumoperitoneum-discussed with hematology oncology  Patient with extensive metastatic disease-currently immunotherapy on hold  Poor prognosis from oncology standpoint    Acute kidney injury [facility])  Assessment & Plan  Possibly due to hydrochlorothiazide, bactrim  Nephrology closely followed    Recent Labs     05/14/23  2029 05/15/23  0434   BUN 51* 47*   CREATININE 1 87* 1 72*   EGFR 35 39       Results from last 7 days   Lab Units 05/11/23  1423   BLOOD UA  Large*   PROTEIN UA mg/dl 70 (1+)*         Autoimmune hemolytic anemia  Assessment & Plan  History of warm antibody hemolytic anemia  Previously treated with Rituxan (8/2022 last received)  • Continue chronic steroid treatment with Dexamethasone  • Previous team spoke to hematology-oncology (Regina De Paz), May taper Dexamethasone from 4mg Q8 hours to Q12 hours due to side effects (Hallucinations)  (Dexamethasone is for the mets to his brain, previously on prednisone for the hemolytic anemia)  • Appreciate hematology-oncology recommendations      History of pulmonary embolism  Assessment & Plan  Maintained on Pradaxa      Transition of Care Discharge Summary - [facility]  Internal Medicine    Patient Information: Sofia Corea 70 y o  male MRN: [id number]  Unit/Bed#: Maureen Guerrero [address] Encounter: [id number]    Discharging Physician / Practitioner: Donavan Ortega MD  PCP: Kadeem Ross DO  Admission Date: 4/30/2023  Discharge Date: 05/17/23    Disposition:      Other: home with home hospice      Reason for Admission: Acute hypoxic respiratory failure    Discharge Diagnoses:     Principal Problem:    Perforation of viscus  Active Problems:    Acute respiratory failure with hypoxia (HCC)    History of pulmonary embolism    Autoimmune hemolytic anemia    Acute kidney injury ([facility] )    Metastatic melanoma (HCC)    History of Clostridium difficile infection    Pneumocystis carinii pneumonia ([facility] )    Hallucination  Resolved Problems:    * No resolved hospital problems  *      Consultations During Hospital Stay:  · IP CONSULT TO PULMONOLOGY  · IP CONSULT TO ONCOLOGY  · IP CONSULT TO INFECTIOUS DISEASES  · IP CONSULT TO NEPHROLOGY  · IP CONSULT TO PHYSICAL MEDICINE REHAB  · IP CONSULT TO ACUTE CARE SURGERY  · IP CONSULT TO PALLIATIVE CARE  · IP CONSULT TO HOSPICE  · IP CONSULT TO ONCOLOGY      Procedures Performed:     · Bronchoscopy    Medication Adjustments and Discharge Medications:  · Medication Dosing Tapers - Please refer to Discharge Medication List for details on any medication dosing tapers (if applicable to patient)  · Discharge Medication List: See after visit summary for reconciled discharge medications  Wound Care Recommendations:  When applicable, please see wound care section of After Visit Summary  Diet Recommendations at Discharge:  Diet -   Fluid Restriction - No Fluid Restriction at Discharge  Significant Findings / Test Results:     XR chest 1 view portable    Result Date: 5/1/2023  Impression: Patchy groundglass infiltrates bilaterally, worse from the prior exam  This could be related to atypical pneumonia versus edema   Workstation performed: [id number]     Bronchoscopy    Result Date: 5/2/2023  Impression: Acute hypoxic respiratory failure Multifocal GGO Metastatic melanoma Immunosuppressed state/obesity RECOMMENDATION: Pt transferred to the ICU for BiPAP, alternating FiO2 down now on HFNC Continue antibiotics, and current dose of dexamethasone 4 mg every 8 Follow BAL as above    PE Study "with CT Abdomen and Pelvis with contrast    Result Date: 4/30/2023  · Impression: 1  No pulmonary artery emboli  2   New bilateral lung groundglass opacities and infiltrates, likely inflammatory/infectious  3  Scattered small lung nodules again visualized, suspicious for metastases  4  Scattered liver lesions again visualized, most concerning for metastases  5  Multiple intra-abdominal and right inguinal nodules, likely metastatic  6  Known osseous metastases better demonstrated on prior PET/CT  Workstation performed: [id number]     Hospital Course:     Ramez Epps is a 70 y o  male patient who originally presented to the hospital on 4/30/2023 due to weakness  Patient has history of metastatic melanoma with brain metastases, diverticulosis, autoimmune hemolytic anemia presented with weakness and dyspnea  Admitted and treated for acute hypoxic respiratory failure  Patient underwent bronchoscopy with increased oxygen requirements and therefore was transferred to ICU  BAL was positive for pneumocystis carini, infect disease closely followed and patient was treated with IV Bactrim and Decadron  Hematology also consulted for patient's metastatic melanoma  Patient also found to have pneumoperitoneum concerning for perforation of viscus, surgery evaluated patient, deemed not a good surgical candidate patient was treated with IV antibiotics Zosyn  After multiple conversations and palliative care involvement family ultimately decided for palliative care and home with hospice  Please see above problem list for further details        Condition at Discharge: poor     Discharge Day Visit / Exam:     Subjective: Patient seen and examined at bedside, comfortable    Vitals: Blood Pressure: 116/69 (05/17/23 0730)  Pulse: 77 (05/17/23 0730)  Temperature: (!) 97 3 °F (36 3 °C) (05/17/23 0730)  Temp Source: Oral (05/17/23 0730)  Respirations: 20 (05/17/23 0730)  Height: 5' 8\" (172 7 cm) (04/30/23 2003)  Weight - Scale: " 87 6 kg (193 lb 2 oz) (05/02/23 1620)  SpO2: (!) 88 % (05/17/23 0741)    Physical Exam:    Constitutional: Patient is oriented to person, place and time, no acute distress  HEENT:  Normocephalic, atraumatic  Cardiovascular: Normal S1S2, RRR, No murmurs/rubs/gallops appreciated  Pulmonary:  Bilateral air entry, No rhonchi/rales/wheezing appreciated  Abdominal: Soft, Bowel sounds present, Non-tender, distended  Extremities:  No cyanosis, clubbing or edema  Neurological: Awake, alert    Discharge instructions/Information to patient and family:   See after visit summary section titled Discharge Instructions for information provided to patient and family  Planned Readmission: no     Discharge Statement:  I spent 35 minutes discharging the patient  This time was spent on the day of discharge  I had direct contact with the patient on the day of discharge  Greater than 50% of the total time was spent examining patient, answering all patient questions, arranging and discussing plan of care with patient as well as directly providing post-discharge instructions  Additional time then spent on discharge activities      ** Please Note: This note has been constructed using a voice recognition system **

## 2023-05-17 NOTE — ASSESSMENT & PLAN NOTE
· Patient had a CAT scan done due to abdominal distention    Noted to have pneumoperitoneum concerning for perforation of the viscus-  · Surgery evaluation appreciated-patient is not a good surgical candidate-currently on Zosyn  · Had a discussion with surgery palliative and hematology oncology today-hospice will be the best course of action  · Patient and family have decided for home hospice, plan to discharge home to home hospice

## 2023-05-17 NOTE — TREATMENT PLAN
Infectious Diseases Chart Note:  - patient to be discharged today on home hospice per chart review  - ID will stop following, but please call as needed    Plan and recommendations were discussed with primary team     Diamante Avina MD

## 2023-05-22 ENCOUNTER — HOME CARE VISIT (OUTPATIENT)
Dept: HOME HOSPICE | Facility: HOSPICE | Age: 68
End: 2023-05-22

## 2023-05-22 VITALS — RESPIRATION RATE: 24 BRPM

## 2023-05-22 LAB
FUNGUS SPEC CULT: NORMAL

## 2023-05-23 LAB
MYCOBACTERIUM SPEC CULT: NORMAL
RHODAMINE-AURAMINE STN SPEC: NORMAL

## 2023-05-24 ENCOUNTER — HOME CARE VISIT (OUTPATIENT)
Dept: HOME HOSPICE | Facility: HOSPICE | Age: 68
End: 2023-05-24

## 2023-05-30 LAB
FUNGUS SPEC CULT: NORMAL

## 2023-05-31 LAB
MYCOBACTERIUM SPEC CULT: NORMAL
RHODAMINE-AURAMINE STN SPEC: NORMAL

## 2023-06-05 LAB
FUNGUS SPEC CULT: NORMAL

## 2023-06-06 LAB
MYCOBACTERIUM SPEC CULT: NORMAL
RHODAMINE-AURAMINE STN SPEC: NORMAL

## 2023-06-12 ENCOUNTER — HOME CARE VISIT (OUTPATIENT)
Dept: HOME HOSPICE | Facility: HOSPICE | Age: 68
End: 2023-06-12
Payer: MEDICARE

## 2023-06-13 LAB
MYCOBACTERIUM SPEC CULT: NORMAL
RHODAMINE-AURAMINE STN SPEC: NORMAL

## 2023-06-15 LAB
SCAN RESULT: NORMAL
SCAN RESULT: NORMAL

## 2023-06-20 LAB
MYCOBACTERIUM SPEC CULT: NORMAL
RHODAMINE-AURAMINE STN SPEC: NORMAL

## 2023-06-26 LAB
MYCOBACTERIUM TUBERCULOSIS COMPLEX PCR: NORMAL
MYCOBACTERIUM TUBERCULOSIS COMPLEX PCR: NORMAL
SCAN RESULT: NORMAL
SCAN RESULT: NORMAL

## 2023-09-26 NOTE — INTERVAL H&P NOTE
Update: (This section must be completed if the H&P was completed greater than 24 hrs to procedure or admission)    H&P reviewed  After examining the patient, I find no changed to the H&P since it had been written  Patient re-evaluated   Accept as history and physical     Riri Palomares MD/September 19, 2022/3:37 PM
Adult
